# Patient Record
Sex: MALE | Race: ASIAN | NOT HISPANIC OR LATINO
[De-identification: names, ages, dates, MRNs, and addresses within clinical notes are randomized per-mention and may not be internally consistent; named-entity substitution may affect disease eponyms.]

---

## 2017-06-23 ENCOUNTER — APPOINTMENT (OUTPATIENT)
Age: 67
End: 2017-06-23

## 2017-07-11 ENCOUNTER — APPOINTMENT (OUTPATIENT)
Dept: VASCULAR SURGERY | Facility: CLINIC | Age: 67
End: 2017-07-11
Payer: MEDICARE

## 2017-07-11 PROCEDURE — 93990 DOPPLER FLOW TESTING: CPT

## 2017-10-10 ENCOUNTER — APPOINTMENT (OUTPATIENT)
Dept: VASCULAR SURGERY | Facility: CLINIC | Age: 67
End: 2017-10-10

## 2017-10-24 ENCOUNTER — APPOINTMENT (OUTPATIENT)
Dept: VASCULAR SURGERY | Facility: CLINIC | Age: 67
End: 2017-10-24
Payer: MEDICARE

## 2017-10-24 PROCEDURE — 93990 DOPPLER FLOW TESTING: CPT

## 2017-10-24 PROCEDURE — 99213 OFFICE O/P EST LOW 20 MIN: CPT

## 2018-02-15 ENCOUNTER — APPOINTMENT (OUTPATIENT)
Dept: VASCULAR SURGERY | Facility: CLINIC | Age: 68
End: 2018-02-15
Payer: MEDICARE

## 2018-02-15 VITALS
HEART RATE: 60 BPM | DIASTOLIC BLOOD PRESSURE: 76 MMHG | RESPIRATION RATE: 17 BRPM | HEIGHT: 67 IN | TEMPERATURE: 98.7 F | WEIGHT: 194 LBS | SYSTOLIC BLOOD PRESSURE: 157 MMHG | BODY MASS INDEX: 30.45 KG/M2

## 2018-02-15 PROCEDURE — 99213 OFFICE O/P EST LOW 20 MIN: CPT

## 2018-02-15 PROCEDURE — 93990 DOPPLER FLOW TESTING: CPT

## 2018-03-01 ENCOUNTER — FORM ENCOUNTER (OUTPATIENT)
Age: 68
End: 2018-03-01

## 2018-03-02 ENCOUNTER — APPOINTMENT (OUTPATIENT)
Age: 68
End: 2018-03-02
Payer: MEDICARE

## 2018-03-02 PROCEDURE — 36907Z: CUSTOM | Mod: 59

## 2018-03-02 PROCEDURE — 36902Z: CUSTOM

## 2018-05-03 ENCOUNTER — APPOINTMENT (OUTPATIENT)
Dept: VASCULAR SURGERY | Facility: CLINIC | Age: 68
End: 2018-05-03
Payer: MEDICARE

## 2018-05-03 PROCEDURE — 93990 DOPPLER FLOW TESTING: CPT

## 2018-05-03 PROCEDURE — 99213 OFFICE O/P EST LOW 20 MIN: CPT

## 2018-08-28 ENCOUNTER — APPOINTMENT (OUTPATIENT)
Dept: VASCULAR SURGERY | Facility: CLINIC | Age: 68
End: 2018-08-28
Payer: MEDICARE

## 2018-08-28 VITALS
HEART RATE: 75 BPM | BODY MASS INDEX: 30.45 KG/M2 | HEIGHT: 67 IN | DIASTOLIC BLOOD PRESSURE: 66 MMHG | SYSTOLIC BLOOD PRESSURE: 133 MMHG | WEIGHT: 194 LBS

## 2018-08-28 PROCEDURE — 99213 OFFICE O/P EST LOW 20 MIN: CPT

## 2018-08-28 PROCEDURE — 93990 DOPPLER FLOW TESTING: CPT

## 2018-11-27 ENCOUNTER — APPOINTMENT (OUTPATIENT)
Dept: VASCULAR SURGERY | Facility: CLINIC | Age: 68
End: 2018-11-27
Payer: MEDICARE

## 2018-11-27 ENCOUNTER — FORM ENCOUNTER (OUTPATIENT)
Age: 68
End: 2018-11-27

## 2018-11-27 PROCEDURE — 93990 DOPPLER FLOW TESTING: CPT

## 2018-11-28 ENCOUNTER — APPOINTMENT (OUTPATIENT)
Dept: ENDOVASCULAR SURGERY | Facility: CLINIC | Age: 68
End: 2018-11-28
Payer: MEDICARE

## 2018-11-28 PROCEDURE — 36908Z: CUSTOM

## 2018-11-28 PROCEDURE — 36902Z: CUSTOM | Mod: 59

## 2019-03-18 ENCOUNTER — APPOINTMENT (OUTPATIENT)
Dept: VASCULAR SURGERY | Facility: CLINIC | Age: 69
End: 2019-03-18
Payer: MEDICARE

## 2019-03-18 PROCEDURE — 93990 DOPPLER FLOW TESTING: CPT

## 2019-03-18 PROCEDURE — 99214 OFFICE O/P EST MOD 30 MIN: CPT

## 2019-03-18 NOTE — PHYSICAL EXAM
[Aneurysm] : aneurysm [Pulsatile Thrill] : pulsatile thrill [Normal] : normoactive bowel sounds, soft and nontender, no hepatosplenomegaly or masses appreciated [Bleeding] : no bleeding

## 2019-03-18 NOTE — REASON FOR VISIT
[Follow-Up Visit] : a follow-up visit for [Aneurysm] : aneurysm [Prolonged Bleeding] : prolonged bleeding

## 2019-03-18 NOTE — ASSESSMENT
[FreeTextEntry1] : Patient with renal failure on hemodialysis. Left brachiocephalic fistula is pulsatile with reported prolonged bleeding after hemodialysis. Plan for fistulogram and angioplasty.

## 2019-04-02 ENCOUNTER — FORM ENCOUNTER (OUTPATIENT)
Age: 69
End: 2019-04-02

## 2019-04-03 ENCOUNTER — APPOINTMENT (OUTPATIENT)
Dept: ENDOVASCULAR SURGERY | Facility: CLINIC | Age: 69
End: 2019-04-03
Payer: MEDICARE

## 2019-04-03 VITALS
BODY MASS INDEX: 28.93 KG/M2 | DIASTOLIC BLOOD PRESSURE: 76 MMHG | WEIGHT: 180 LBS | OXYGEN SATURATION: 98 % | RESPIRATION RATE: 18 BRPM | SYSTOLIC BLOOD PRESSURE: 156 MMHG | TEMPERATURE: 97.4 F | HEART RATE: 68 BPM | HEIGHT: 66 IN

## 2019-04-03 PROCEDURE — 36902Z: CUSTOM

## 2019-04-03 PROCEDURE — 36907Z: CUSTOM | Mod: 59

## 2019-04-12 NOTE — REASON FOR VISIT
[Other ___] : a [unfilled] visit for [Prolonged Bleeding] : prolonged bleeding [Spouse] : spouse [FreeTextEntry2] : stenosis on duplex

## 2019-04-12 NOTE — PAST MEDICAL HISTORY
[Increasing age ( >40 years old)] : Increasing age ( >40 years old) [Major surgery] : Major surgery [No therapy indicated for cases scheduled for less than one hour] : No therapy indicated for cases scheduled for less than one hour. [FreeTextEntry1] : Malignant Hyperthermia Screening Tool and Risk of Bleeding Assessment\par \par Mr. VIOLETTA RENTERIA denies family history of unexpected death following Anesthesia or Exercise.\par Denies Family history of Malignant Hyperthermia, Muscle or Neuromuscular disorder and High Temperature following exercise.\par \par Mr. VIOLETTA RENTERIA denies history of Muscle Spasm, Dark or Chocolate - Colored urine and Unanticipated fever immediately following anesthesia or serious exercise. \par Mr. RENTERIA also denies bleeding tendencies/ Risks of Bleeding.\par

## 2019-07-22 ENCOUNTER — APPOINTMENT (OUTPATIENT)
Dept: VASCULAR SURGERY | Facility: CLINIC | Age: 69
End: 2019-07-22
Payer: MEDICARE

## 2019-07-22 VITALS
WEIGHT: 180 LBS | HEART RATE: 69 BPM | TEMPERATURE: 97.8 F | BODY MASS INDEX: 28.93 KG/M2 | DIASTOLIC BLOOD PRESSURE: 78 MMHG | SYSTOLIC BLOOD PRESSURE: 151 MMHG | HEIGHT: 66 IN

## 2019-07-22 PROCEDURE — 99213 OFFICE O/P EST LOW 20 MIN: CPT

## 2019-07-22 PROCEDURE — 93990 DOPPLER FLOW TESTING: CPT

## 2019-07-22 NOTE — ASSESSMENT
[FreeTextEntry1] : Patient with renal failure on hemodialysis with moderate stenosis of fistula on duplex. No difficulty analysis. Recommend close followup in 6 weeks duplex.

## 2019-07-22 NOTE — PHYSICAL EXAM
[Aneurysm] : aneurysm [Thrill] : thrill [Bleeding] : no bleeding [Normal] : normoactive bowel sounds, soft and nontender, no hepatosplenomegaly or masses appreciated

## 2019-09-09 ENCOUNTER — APPOINTMENT (OUTPATIENT)
Dept: VASCULAR SURGERY | Facility: CLINIC | Age: 69
End: 2019-09-09
Payer: MEDICARE

## 2019-09-09 PROCEDURE — 93990 DOPPLER FLOW TESTING: CPT

## 2019-09-09 PROCEDURE — 99213 OFFICE O/P EST LOW 20 MIN: CPT

## 2019-09-11 NOTE — HISTORY OF PRESENT ILLNESS
[] : left brachiocephalic fistula [FreeTextEntry1] : 70 yo male with history of esrd on hd presents for follow up of left upper extremity avf.  pt reports prolonged bleeding from the left upper extremity avf after hd especialy the distal puncture site about once a week

## 2019-09-11 NOTE — DISCUSSION/SUMMARY
[FreeTextEntry1] : 70 yo male with history of esrd on hd presents for follow up of left upper extremity avf.\par duplex shows 50-75% stenosis of the mid upper arm \par given the bleeding after hd and tense pulsitility of avf will arrange for fistulagram of the left upper extremity

## 2019-09-11 NOTE — PHYSICAL EXAM
[Thrill] : thrill [Pulsatile Thrill] : pulsatile thrill [Aneurysm] : aneurysm [Hand well perfused] : hand well perfused [2+] : left 2+ [Ulcer] : no ulcer [Bleeding] : no bleeding [Normal] : normoactive bowel sounds, soft and nontender, no hepatosplenomegaly or masses appreciated [de-identified] :  strength 5/5 b/l upper extremities [de-identified] : intact

## 2019-09-16 ENCOUNTER — OUTPATIENT (OUTPATIENT)
Dept: OUTPATIENT SERVICES | Facility: HOSPITAL | Age: 69
LOS: 1 days | Discharge: ANOTHER HOSPITAL | End: 2019-09-16
Payer: MEDICARE

## 2019-09-16 ENCOUNTER — INPATIENT (INPATIENT)
Facility: HOSPITAL | Age: 69
LOS: 19 days | Discharge: HOME | DRG: 235 | End: 2019-10-06
Attending: THORACIC SURGERY (CARDIOTHORACIC VASCULAR SURGERY) | Admitting: THORACIC SURGERY (CARDIOTHORACIC VASCULAR SURGERY)
Payer: MEDICARE

## 2019-09-16 VITALS
WEIGHT: 184.53 LBS | HEIGHT: 66 IN | SYSTOLIC BLOOD PRESSURE: 198 MMHG | HEART RATE: 73 BPM | DIASTOLIC BLOOD PRESSURE: 91 MMHG

## 2019-09-16 DIAGNOSIS — Z98.49 CATARACT EXTRACTION STATUS, UNSPECIFIED EYE: Chronic | ICD-10-CM

## 2019-09-16 DIAGNOSIS — I25.10 ATHEROSCLEROTIC HEART DISEASE OF NATIVE CORONARY ARTERY WITHOUT ANGINA PECTORIS: ICD-10-CM

## 2019-09-16 DIAGNOSIS — Z99.2 DEPENDENCE ON RENAL DIALYSIS: Chronic | ICD-10-CM

## 2019-09-16 DIAGNOSIS — N18.6 END STAGE RENAL DISEASE: ICD-10-CM

## 2019-09-16 DIAGNOSIS — E78.00 PURE HYPERCHOLESTEROLEMIA, UNSPECIFIED: ICD-10-CM

## 2019-09-16 DIAGNOSIS — E11.22 TYPE 2 DIABETES MELLITUS WITH DIABETIC CHRONIC KIDNEY DISEASE: ICD-10-CM

## 2019-09-16 DIAGNOSIS — I25.110 ATHEROSCLEROTIC HEART DISEASE OF NATIVE CORONARY ARTERY WITH UNSTABLE ANGINA PECTORIS: ICD-10-CM

## 2019-09-16 LAB
ANION GAP SERPL CALC-SCNC: 18 MMO/L — HIGH (ref 7–14)
APPEARANCE UR: CLEAR — SIGNIFICANT CHANGE UP
APTT BLD: 29.8 SEC — SIGNIFICANT CHANGE UP (ref 27.5–36.3)
BASOPHILS # BLD AUTO: 0 K/UL — SIGNIFICANT CHANGE UP (ref 0–0.2)
BASOPHILS NFR BLD AUTO: 0.4 % — SIGNIFICANT CHANGE UP (ref 0–2)
BILIRUB UR-MCNC: NEGATIVE — SIGNIFICANT CHANGE UP
BLD GP AB SCN SERPL QL: NEGATIVE — SIGNIFICANT CHANGE UP
BUN SERPL-MCNC: 68 MG/DL — HIGH (ref 7–23)
CALCIUM SERPL-MCNC: 9.2 MG/DL — SIGNIFICANT CHANGE UP (ref 8.4–10.5)
CHLORIDE SERPL-SCNC: 94 MMOL/L — LOW (ref 98–107)
CK MB BLD-MCNC: 4.6 % — HIGH (ref 0–3.5)
CK MB CFR SERPL CALC: 5.8 NG/ML — SIGNIFICANT CHANGE UP (ref 0–6.7)
CK SERPL-CCNC: 127 U/L — SIGNIFICANT CHANGE UP (ref 30–200)
CO2 SERPL-SCNC: 25 MMOL/L — SIGNIFICANT CHANGE UP (ref 22–31)
COLOR SPEC: SIGNIFICANT CHANGE UP
CREAT SERPL-MCNC: 7.8 MG/DL — HIGH (ref 0.5–1.3)
DIFF PNL FLD: ABNORMAL
EOSINOPHIL # BLD AUTO: 0.2 K/UL — SIGNIFICANT CHANGE UP (ref 0–0.5)
EOSINOPHIL NFR BLD AUTO: 1.8 % — SIGNIFICANT CHANGE UP (ref 0–6)
ESTIMATED AVERAGE GLUCOSE: 171 MG/DL — HIGH (ref 68–114)
GLUCOSE BLDC GLUCOMTR-MCNC: 116 MG/DL — HIGH (ref 70–99)
GLUCOSE BLDC GLUCOMTR-MCNC: 156 MG/DL — HIGH (ref 70–99)
GLUCOSE SERPL-MCNC: 170 MG/DL — HIGH (ref 70–99)
GLUCOSE UR QL: ABNORMAL
HBA1C BLD-MCNC: 7.6 % — HIGH (ref 4–5.6)
HBA1C BLD-MCNC: 7.6 % — HIGH (ref 4–5.6)
HBA1C BLD-MCNC: 7.7 % — HIGH (ref 4–5.6)
HCT VFR BLD CALC: 37.5 % — LOW (ref 39–50)
HCT VFR BLD CALC: 40.3 % — SIGNIFICANT CHANGE UP (ref 39–50)
HGB BLD-MCNC: 12.3 G/DL — LOW (ref 13–17)
HGB BLD-MCNC: 13.3 G/DL — SIGNIFICANT CHANGE UP (ref 13–17)
INR BLD: 1.15 RATIO — SIGNIFICANT CHANGE UP (ref 0.88–1.16)
KETONES UR-MCNC: NEGATIVE — SIGNIFICANT CHANGE UP
LEUKOCYTE ESTERASE UR-ACNC: NEGATIVE — SIGNIFICANT CHANGE UP
LYMPHOCYTES # BLD AUTO: 1.7 K/UL — SIGNIFICANT CHANGE UP (ref 1–3.3)
LYMPHOCYTES # BLD AUTO: 18.7 % — SIGNIFICANT CHANGE UP (ref 13–44)
MCHC RBC-ENTMCNC: 28.7 PG — SIGNIFICANT CHANGE UP (ref 27–34)
MCHC RBC-ENTMCNC: 29.1 PG — SIGNIFICANT CHANGE UP (ref 27–34)
MCHC RBC-ENTMCNC: 32.8 % — SIGNIFICANT CHANGE UP (ref 32–36)
MCHC RBC-ENTMCNC: 32.9 GM/DL — SIGNIFICANT CHANGE UP (ref 32–36)
MCV RBC AUTO: 87.6 FL — SIGNIFICANT CHANGE UP (ref 80–100)
MCV RBC AUTO: 88.7 FL — SIGNIFICANT CHANGE UP (ref 80–100)
MONOCYTES # BLD AUTO: 0.6 K/UL — SIGNIFICANT CHANGE UP (ref 0–0.9)
MONOCYTES NFR BLD AUTO: 6.3 % — SIGNIFICANT CHANGE UP (ref 2–14)
MRSA PCR RESULT.: SIGNIFICANT CHANGE UP
NEUTROPHILS # BLD AUTO: 6.6 K/UL — SIGNIFICANT CHANGE UP (ref 1.8–7.4)
NEUTROPHILS NFR BLD AUTO: 72.8 % — SIGNIFICANT CHANGE UP (ref 43–77)
NITRITE UR-MCNC: NEGATIVE — SIGNIFICANT CHANGE UP
NRBC # FLD: 0 K/UL — SIGNIFICANT CHANGE UP (ref 0–0)
PA ADP PRP-ACNC: 241 PRU — SIGNIFICANT CHANGE UP (ref 194–417)
PH UR: 6.5 — SIGNIFICANT CHANGE UP (ref 5–8)
PLATELET # BLD AUTO: 167 K/UL — SIGNIFICANT CHANGE UP (ref 150–400)
PLATELET # BLD AUTO: 176 K/UL — SIGNIFICANT CHANGE UP (ref 150–400)
PMV BLD: 10.4 FL — SIGNIFICANT CHANGE UP (ref 7–13)
POTASSIUM SERPL-MCNC: 4.7 MMOL/L — SIGNIFICANT CHANGE UP (ref 3.5–5.3)
POTASSIUM SERPL-SCNC: 4.7 MMOL/L — SIGNIFICANT CHANGE UP (ref 3.5–5.3)
PROT UR-MCNC: ABNORMAL
PROTHROM AB SERPL-ACNC: 13.3 SEC — HIGH (ref 10–12.9)
RBC # BLD: 4.28 M/UL — SIGNIFICANT CHANGE UP (ref 4.2–5.8)
RBC # BLD: 4.55 M/UL — SIGNIFICANT CHANGE UP (ref 4.2–5.8)
RBC # FLD: 11.7 % — SIGNIFICANT CHANGE UP (ref 10.3–14.5)
RBC # FLD: 12.2 % — SIGNIFICANT CHANGE UP (ref 10.3–14.5)
RH IG SCN BLD-IMP: POSITIVE — SIGNIFICANT CHANGE UP
S AUREUS DNA NOSE QL NAA+PROBE: SIGNIFICANT CHANGE UP
SODIUM SERPL-SCNC: 137 MMOL/L — SIGNIFICANT CHANGE UP (ref 135–145)
SP GR SPEC: 1.02 — SIGNIFICANT CHANGE UP (ref 1.01–1.02)
TROPONIN T, HIGH SENSITIVITY RESULT: 98 NG/L — HIGH (ref 0–51)
TSH SERPL-MCNC: 0.36 UIU/ML — SIGNIFICANT CHANGE UP (ref 0.27–4.2)
UROBILINOGEN FLD QL: NEGATIVE — SIGNIFICANT CHANGE UP
WBC # BLD: 8.07 K/UL — SIGNIFICANT CHANGE UP (ref 3.8–10.5)
WBC # BLD: 9 K/UL — SIGNIFICANT CHANGE UP (ref 3.8–10.5)
WBC # FLD AUTO: 8.07 K/UL — SIGNIFICANT CHANGE UP (ref 3.8–10.5)
WBC # FLD AUTO: 9 K/UL — SIGNIFICANT CHANGE UP (ref 3.8–10.5)

## 2019-09-16 PROCEDURE — 71045 X-RAY EXAM CHEST 1 VIEW: CPT | Mod: 26

## 2019-09-16 PROCEDURE — 93010 ELECTROCARDIOGRAM REPORT: CPT

## 2019-09-16 PROCEDURE — 99291 CRITICAL CARE FIRST HOUR: CPT

## 2019-09-16 RX ORDER — SODIUM CHLORIDE 9 MG/ML
3 INJECTION INTRAMUSCULAR; INTRAVENOUS; SUBCUTANEOUS EVERY 8 HOURS
Refills: 0 | Status: DISCONTINUED | OUTPATIENT
Start: 2019-09-16 | End: 2019-09-23

## 2019-09-16 RX ORDER — LABETALOL HCL 100 MG
200 TABLET ORAL EVERY 8 HOURS
Refills: 0 | Status: DISCONTINUED | OUTPATIENT
Start: 2019-09-16 | End: 2019-09-17

## 2019-09-16 RX ORDER — HEPARIN SODIUM 5000 [USP'U]/ML
1000 INJECTION INTRAVENOUS; SUBCUTANEOUS
Qty: 25000 | Refills: 0 | Status: DISCONTINUED | OUTPATIENT
Start: 2019-09-16 | End: 2019-09-19

## 2019-09-16 RX ORDER — ATORVASTATIN CALCIUM 80 MG/1
80 TABLET, FILM COATED ORAL AT BEDTIME
Refills: 0 | Status: DISCONTINUED | OUTPATIENT
Start: 2019-09-16 | End: 2019-09-23

## 2019-09-16 RX ORDER — ASPIRIN/CALCIUM CARB/MAGNESIUM 324 MG
81 TABLET ORAL DAILY
Refills: 0 | Status: DISCONTINUED | OUTPATIENT
Start: 2019-09-16 | End: 2019-09-23

## 2019-09-16 RX ORDER — METOPROLOL TARTRATE 50 MG
2.5 TABLET ORAL ONCE
Refills: 0 | Status: COMPLETED | OUTPATIENT
Start: 2019-09-16 | End: 2019-09-16

## 2019-09-16 RX ORDER — AMLODIPINE BESYLATE 2.5 MG/1
10 TABLET ORAL DAILY
Refills: 0 | Status: DISCONTINUED | OUTPATIENT
Start: 2019-09-16 | End: 2019-09-23

## 2019-09-16 RX ORDER — SODIUM CHLORIDE 9 MG/ML
3 INJECTION INTRAMUSCULAR; INTRAVENOUS; SUBCUTANEOUS EVERY 8 HOURS
Refills: 0 | Status: DISCONTINUED | OUTPATIENT
Start: 2019-09-16 | End: 2019-10-04

## 2019-09-16 RX ORDER — INSULIN LISPRO 100/ML
VIAL (ML) SUBCUTANEOUS
Refills: 0 | Status: DISCONTINUED | OUTPATIENT
Start: 2019-09-16 | End: 2019-09-23

## 2019-09-16 RX ORDER — INSULIN LISPRO 100/ML
VIAL (ML) SUBCUTANEOUS AT BEDTIME
Refills: 0 | Status: DISCONTINUED | OUTPATIENT
Start: 2019-09-16 | End: 2019-09-23

## 2019-09-16 RX ORDER — DOCUSATE SODIUM 100 MG
100 CAPSULE ORAL THREE TIMES A DAY
Refills: 0 | Status: DISCONTINUED | OUTPATIENT
Start: 2019-09-16 | End: 2019-09-23

## 2019-09-16 RX ORDER — PANTOPRAZOLE SODIUM 20 MG/1
40 TABLET, DELAYED RELEASE ORAL
Refills: 0 | Status: DISCONTINUED | OUTPATIENT
Start: 2019-09-16 | End: 2019-09-23

## 2019-09-16 RX ORDER — CHLORHEXIDINE GLUCONATE 213 G/1000ML
1 SOLUTION TOPICAL
Refills: 0 | Status: DISCONTINUED | OUTPATIENT
Start: 2019-09-16 | End: 2019-09-23

## 2019-09-16 RX ORDER — HEPARIN SODIUM 5000 [USP'U]/ML
5000 INJECTION INTRAVENOUS; SUBCUTANEOUS EVERY 8 HOURS
Refills: 0 | Status: DISCONTINUED | OUTPATIENT
Start: 2019-09-16 | End: 2019-09-16

## 2019-09-16 RX ADMIN — Medication 100 MILLIGRAM(S): at 21:59

## 2019-09-16 RX ADMIN — Medication 200 MILLIGRAM(S): at 21:59

## 2019-09-16 RX ADMIN — Medication 200 MILLIGRAM(S): at 18:43

## 2019-09-16 RX ADMIN — ATORVASTATIN CALCIUM 80 MILLIGRAM(S): 80 TABLET, FILM COATED ORAL at 21:59

## 2019-09-16 RX ADMIN — CHLORHEXIDINE GLUCONATE 1 APPLICATION(S): 213 SOLUTION TOPICAL at 22:00

## 2019-09-16 RX ADMIN — SODIUM CHLORIDE 3 MILLILITER(S): 9 INJECTION INTRAMUSCULAR; INTRAVENOUS; SUBCUTANEOUS at 10:00

## 2019-09-16 RX ADMIN — AMLODIPINE BESYLATE 10 MILLIGRAM(S): 2.5 TABLET ORAL at 16:28

## 2019-09-16 RX ADMIN — PANTOPRAZOLE SODIUM 40 MILLIGRAM(S): 20 TABLET, DELAYED RELEASE ORAL at 22:00

## 2019-09-16 RX ADMIN — Medication 2.5 MILLIGRAM(S): at 16:21

## 2019-09-16 RX ADMIN — Medication 81 MILLIGRAM(S): at 16:28

## 2019-09-16 NOTE — H&P ADULT - NSHPPHYSICALEXAM_GEN_ALL_CORE
General: WN/WD NAD  Neurology: A&Ox3, nonfocal, GRAVES x 4  Head:  Normocephalic, atraumatic  ENT:  Mucosa moist, no ulcerations  Neck:  Supple, no sinuses or palpable masses  Lymphatic:  No palpable cervical, supraclavicular, axillary or inguinal adenopathy  Respiratory: CTA B/L  CV: RRR, S1S2, no murmur  Abdominal: Soft, NT, ND no palpable mass  MSK: No edema, + peripheral pulses, FROM all 4 extremity  Incisions: intact, no erythema or drainage General: WN/WD NAD, Lying comfortably in bed  Neurology: A&Ox3, nonfocal, GRAVES x 4  Head:  Normocephalic, atraumatic  ENT:  Mucosa moist, no ulcerations  Neck:  Supple, no sinuses or palpable masses  Lymphatic:  No palpable cervical, supraclavicular, axillary or inguinal adenopathy  Respiratory: CTA B/L, equal chest expansion  CV: RRR, S1S2, no murmur, gallops or rubs; Sinus rhythm on telemetry  Abdominal: Obese, Soft, NT, ND no palpable mass  MSK: No edema, + peripheral pulses, FROM all 4 extremity  Vascular: Right groin cath site, C/D/I, sheath removed at 1425, LUE AVF with palpable thrill.

## 2019-09-16 NOTE — H&P ADULT - NSHPLABSRESULTS_GEN_ALL_CORE
CORONARY VESSELS: The coronary circulation is right dominant.  LM:   --  Ostial LM: There was a discrete 90 % stenosis.  LAD:   --  Mid LAD: Angiography showed minor luminal irregularities with no  flow limiting lesions. The stent in the mid LAD is patent.  --  Distal LAD: The vessel was small sized. Angiography showed moderate  atherosclerosis.  CX:   --  Mid circumflex: There was a tubular 50 % stenosis at the site of  a prior stent.  RI:   --  Proximal ramus intermedius: There was a diffuse 50 % stenosis at  the site of a prior stent.  RCA:   --  Mid RCA: There was a tubular 50 % stenosis at the site of a  prior stent.  --  RPLS: Angiography showed minor luminal irregularities with no flow  limiting lesions.    Complete Blood Count + Automated Diff (09.16.19 @ 17:12)    WBC Count: 9.0 K/uL    RBC Count: 4.55 M/uL    Hemoglobin: 13.3 g/dL    Hematocrit: 40.3 %    Mean Cell Volume: 88.7 fl    Mean Cell Hemoglobin: 29.1 pg    Mean Cell Hemoglobin Conc: 32.9 gm/dL    Red Cell Distrib Width: 11.7 %    Platelet Count - Automated: 176 K/uL    Auto Neutrophil #: 6.6 K/uL    Auto Lymphocyte #: 1.7 K/uL    Auto Monocyte #: 0.6 K/uL    Auto Eosinophil #: 0.2 K/uL    Auto Basophil #: 0.0 K/uL    Auto Neutrophil %: 72.8: Differential percentages must be correlated with absolute numbers for  clinical significance. %    Auto Lymphocyte %: 18.7 %    Auto Monocyte %: 6.3 %    Auto Eosinophil %: 1.8 %    Auto Basophil %: 0.4 %    Basic Metabolic Panel - STAT (09.16.19 @ 10:05)    Sodium, Serum: 137 mmol/L    Potassium, Serum: 4.7 mmol/L    Chloride, Serum: 94 mmol/L    Carbon Dioxide, Serum: 25 mmol/L    Anion Gap, Serum: 18 mmo/L    Blood Urea Nitrogen, Serum: 68 mg/dL    Creatinine, Serum: 7.80 mg/dL    Glucose, Serum: 170 mg/dL    Calcium, Total Serum: 9.2 mg/dL    eGFR if Non : 6: The units for eGFR are ml/min/1.73m2 (normalized body  surface area). The eGFR is calculated from a serum  creatinine using the CKD-EPI equation. Other variables  required for calculation are race, age and sex. Among  patients with chronic kidney disease (CKD), the eGFR is  useful in determining the stage of disease according to  KDOQI CKD classification. All eGFR results are reported  numerically with the following interpretation.    GFR  (ml/min/1.73 m2)          W/KIDNEY DAMAGE    W/O KIDNEY DMG  ==========================================================  >= 90.......................Stage 1..............Normal  60-89.......................Stage 2...........Decreased GFR  30-59.......................Stage 3..............Stage 3  15-29.......................Stage 4..............Stage 4  < 15........................Stage 5..............Stage 5    Each stage of CKD assumes that the associated GFR level  has been in effect for at least 3 months. Determination of  stages one and two (with eGFR > 59ml/min/m2) requires  estimation of kidney damage for at least 3 months as  defined by structural or functional abnormalities.    Limitations: All estimates of GFR will be less accurate  for patients at extremes of muscle mass (including but  not limited to frail elderly, critically ill, or cancer  patients), those with unusual diets, and those with  conditions associated with reduced secretion or  extrarenal elimination of creatinine. The eGFR equation  is not recommended for use in patients with unstable  creatinine levels. mL/min    eGFR if : 7 mL/min

## 2019-09-16 NOTE — H&P ADULT - NSICDXFAMILYHX_GEN_ALL_CORE_FT
FAMILY HISTORY:  Family history of coronary artery disease    Sibling  Still living? Unknown  Family history of diabetes mellitus, Age at diagnosis: Age Unknown

## 2019-09-16 NOTE — H&P ADULT - ASSESSMENT
69M ESRD on HD(Tu/Th/Sa), HTN, CAD s/p PCI with 15 stents placed, s/p cardiac cath today which showed LM disease with 90%, now transferred to [facility] for CT Surgery evaluation.    -Resume home anti-hypertensive meds  - Hold Brillinta  - Heparin drip  - Pre-operative workup  - Carotids, TFTs, Hgb A1c,   - Plan for CABG with Dr. Herzog

## 2019-09-16 NOTE — H&P CARDIOLOGY - PSH
Boil of Buttock  2006 and 2008  Hemodialysis access, AV graft  5/2015, L arm  S/P cataract extraction

## 2019-09-16 NOTE — H&P CARDIOLOGY - REVIEW OF SYSTEMS
The patient denies chest pain,  palpitations, dizziness, presyncope, syncope, b/l lower  extremities edema, abdominal pain, N/V/D/C, melena, hematochezia, h/o DVT, PE, AMY, fever, chills, urinary symptoms, hematuria.

## 2019-09-16 NOTE — H&P CARDIOLOGY - ATTENDING COMMENTS
Patient seen and examined.  Agree with above.   -Admitted post cath after cath revealed severe ostial LM disease  -CTS eval with Dr. Herzog for CABG evaluation  -Transfer to [facility] For CABG  -further workup pending above    Kalie Lau MD

## 2019-09-16 NOTE — H&P ADULT - HISTORY OF PRESENT ILLNESS
69 y.o. male with pmh of ESRD( HD on tu/Th/sa) Last dialyzed yesterday 9/15, was sent to [facility] for cath after presenting with SOB and a positive stress test. Presented today for elective cardiac catheterization. The patient c/o SOB with exertion (walking <1 block) started 2 months ago. The patient denies chest pain,  palpitations, dizziness, presyncope, syncope, b/l lower  extremities edema, abdominal pain, N/V/D/C, melena, hematochezia, h/o DVT, PE, AMY, fever, chills, urinary symptoms, hematuria.  The patient was evaluated by his cardiologist, was noted to have abnormal stress test (5/13/19) - EF 50%, fixed proximal and mid inferior and inferolateral wall perfusion defect c/w MI and mild to moderate jose martin-infarct ischemia.  Echo- 8/2019 - normal LV size and function, mild diastolic dysfunction, mild TR.  Due to patient's symptoms and abnormal non invasive tests, the patient was recommended to have cardiac catheterization. The patient denies any complaints at present.   Cardiac cath performed and revealed Ostial Left main stenosis of 90%, Mid Cx in stent 50% and RCA in stent 50%; Patent stent in LAD. Patient was transferred to [facility] for CT surgery eval and possible CABG with Dr. Herzog.

## 2019-09-16 NOTE — H&P CARDIOLOGY - NS MD HP PULSE RADIAL
What Type Of Note Output Would You Prefer (Optional)?: Bullet Format What Is The Reason For Today's Visit?: Full Body Skin Examination with No Concerns What Is The Reason For Today's Visit? (Being Monitored For X): concerning skin lesions on an annual basis How Severe Are Your Spot(S)?: mild right normal/left normal

## 2019-09-16 NOTE — H&P CARDIOLOGY - HISTORY OF PRESENT ILLNESS
69 y.o. male presents today for elective cardiac catheterization. The patient c/o SOB with exertion (walking <1 block) started 2 months ago. The patient denies chest pain,  palpitations, dizziness, presyncope, syncope, b/l lower  extremities edema, abdominal pain, N/V/D/C, melena, hematochezia, h/o DVT, PE, AMY, fever, chills, urinary symptoms, hematuria. 69 y.o. male presents today for elective cardiac catheterization. The patient c/o SOB with exertion (walking <1 block) started 2 months ago. The patient denies chest pain,  palpitations, dizziness, presyncope, syncope, b/l lower  extremities edema, abdominal pain, N/V/D/C, melena, hematochezia, h/o DVT, PE, AMY, fever, chills, urinary symptoms, hematuria.  The patient was evaluated by his cardiologist, was noted to have abnormal stress test (5/13/19) - EF 50%, fixed proximal and mid inferior and inferolateral wall perfusion defect c/w MI and mild to moderate jose martin-infarct ischemia.  Echo- 8/2019 - normal LV size and function, mild diastolic dysfunction, mild TR.  Due to patient's symptoms and abnormal non invasive tests, the patient was recommended to have cardiac catheterization. The patient denies any complaints at present.

## 2019-09-16 NOTE — CONSULT NOTE ADULT - SUBJECTIVE AND OBJECTIVE BOX
[facility] - Eugene / Marlena S /Cruz/ IWLY Mackey/ WILY Macias/ Alexandre Esposito / WILMER Njdocu  ---------------------------------------------------------------------------------------------------------------  Patient seen and examined bedside in cath recovery room    69 y.o. male w/ESRD on HD TTS, our hd pt from [facility], presented today for an elective cardiac catheterization. The patient c/o SOB with exertion (walking <1 block) started 2 months ago. Pt had abnormal stress test (5/13/19) - EF 50%, fixed proximal and mid inferior and inferolateral wall perfusion defect c/w MI and mild to moderate jose martin-infarct ischemia. Echo- 8/2019 - normal LV size and function, mild diastolic dysfunction, mild TR.  Due to patient's symptoms and abnormal non invasive tests, the patient was recommended to have cardiac catheterization.  pt completed cath, found to have significant left main artery disease, plan to transfer to [facility] for CT sx eval  Renal consulted for ESRD Mx.  The patient denies any complaints at present. denied cp/sob. had last hd 9/14, was complete and uneventful    PAST MEDICAL & SURGICAL HISTORY:  ESRD (end stage renal disease) on dialysis: T-Th-Sat  Stented coronary artery: total 15 stents from 3311-8314  Hyperthyroidism  H/O: CVA: after cardiac stent placed 12/15/09 -no residual  Heart Attack: 2/1/07 with subsequent stent  Coronary Stent: [id number] 10 stents,  to Ramus 1/2015, cath 01/2016 ( see results in HPI)  Hypercholesterolemia  CAD (Coronary Artery Disease)  DM (Diabetes Mellitus): x 4 yrs without N/N/R  HTN (Hypertension)  Hemodialysis access, AV graft: 5/2015, L arm  S/P cataract extraction  Boil of Buttock: 2006 and 2008      Allergies: lisinopril (Other)    Home Medications Reviewed  Hospital Medications:   MEDICATIONS  (STANDING):  sodium chloride 0.9% lock flush 3 milliLiter(s) IV Push every 8 hours    SOCIAL HISTORY:  Denies ETOh,Smoking, illicit drug use  FAMILY HISTORY:  Family history of coronary artery disease  Family history of diabetes mellitus (Sibling)      REVIEW OF SYSTEMS:  CONSTITUTIONAL: No weakness, fevers or chills  EYES/ENT: No visual changes;  No vertigo or throat pain   NECK: No pain or stiffness  RESPIRATORY: No cough, wheezing, hemoptysis; No shortness of breath  CARDIOVASCULAR: No chest pain or palpitations.  GASTROINTESTINAL: No abdominal or epigastric pain. No nausea, vomiting, or hematemesis; No diarrhea or constipation. No melena or hematochezia.  NEUROLOGICAL: No numbness or weakness  SKIN: No itching, burning, rashes, or lesions   VASCULAR: No bilateral lower extremity edema.   All other review of systems is negative unless indicated above.    VITALS:  HR: --  BP: --  RR: 14      Weight (kg): 81.647 (09-16 @ 10:27)    PHYSICAL EXAM:  Constitutional: NAD  HEENT: anicteric sclera, oropharynx clear, MMM  Neck: No JVD  Respiratory: CTAB, no wheezes, rales or rhonchi  Cardiovascular: S1, S2, RRR  Gastrointestinal: BS+, soft, NT/ND  Extremities: No cyanosis or clubbing. No peripheral edema  Neurological: A/O x 3, no focal deficits  Psychiatric: Normal mood, normal affect  : No wagner.       LABS:  09-16    137  |  94<L>  |  68<H>  ----------------------------<  170<H>  4.7   |  25  |  7.80<H>    Ca    9.2      16 Sep 2019 10:05      Creatinine Trend: 7.80 <--                        12.3   8.07  )-----------( 167      ( 16 Sep 2019 10:05 )             37.5     Urine Studies:        RADIOLOGY & ADDITIONAL STUDIES:

## 2019-09-16 NOTE — PROGRESS NOTE ADULT - SUBJECTIVE AND OBJECTIVE BOX
VIOLETTA RENTERIA  MRN-[id number]  Patient is a 69y old  Male who presents with a chief complaint of   HPI:  69 y.o. male with pmh of ESRD( HD on ) Last dialyzed yesterday 9/15, was sent to [facility] for cath after presenting with SOB and a positive stress test. Presented today for elective cardiac catheterization. The patient c/o SOB with exertion (walking <1 block) started 2 months ago. The patient denies chest pain,  palpitations, dizziness, presyncope, syncope, b/l lower  extremities edema, abdominal pain, N/V/D/C, melena, hematochezia, h/o DVT, PE, AMY, fever, chills, urinary symptoms, hematuria.  The patient was evaluated by his cardiologist, was noted to have abnormal stress test (19) - EF 50%, fixed proximal and mid inferior and inferolateral wall perfusion defect c/w MI and mild to moderate jose martin-infarct ischemia.  Echo- 2019 - normal LV size and function, mild diastolic dysfunction, mild TR.  Due to patient's symptoms and abnormal non invasive tests, the patient was recommended to have cardiac catheterization. The patient denies any complaints at present.   Cardiac cath performed and revealed Ostial Left main stenosis of 90%, Mid Cx in stent 50% and RCA in stent 50%; Patent stent in LAD. Patient was transferred to [facility] for CT surgery eval and possible CABG with Dr. Herzog. (16 Sep 2019 16:43)      Pre Surgery/Hospital course:  c/o dyspnea, no chest pain. + occasional cough,   no fever ; breath better on NC 3L  O2 support   + leg edema, got better.     physical Exam:  Vital Signs Last 24 Hrs  T(C): 36 (16 Sep 2019 20:00), Max: 36 (16 Sep 2019 20:00)  T(F): 96.8 (16 Sep 2019 20:00), Max: 96.8 (16 Sep 2019 20:00)  HR: 63 (16 Sep 2019 21:00) (63 - 73)  BP: 142/69 (16 Sep 2019 21:00) (142/69 - 198/91)  BP(mean): 99 (16 Sep 2019 21:00) (99 - 130)  RR: 21 (16 Sep 2019 21:00) (18 - 26)  SpO2: 100% (16 Sep 2019 21:00) (100% - 100%)  Gen:  Awake, alert   CNS: non focal 	  Neck: no JVD  RES : + bibasilar rales         CVS: Regular  rhythm. Normal S1/S2  Abd: Soft, non-distended. Bowel sounds present.  Skin: No rash.  Ext:  trace edema lower ext.      ============================I/O===========================   I&O's Detail    16 Sep 2019 07:01  -  16 Sep 2019 22:22  --------------------------------------------------------  IN:    heparin Infusion: 10 mL    Oral Fluid: 200 mL  Total IN: 210 mL    OUT:    Voided: 225 mL  Total OUT: 225 mL    Total NET: -15 mL        ============================ LABS =========================                        13.3   9.0   )-----------( 176      ( 16 Sep 2019 17:12 )             40.3     09-16    136  |  93<L>  |  65<H>  ----------------------------<  135<H>  4.2   |  23  |  8.24<H>    Ca    9.5      16 Sep 2019 17:12    TPro  7.2  /  Alb  4.1  /  TBili  0.4  /  DBili  x   /  AST  10  /  ALT  10  /  AlkPhos  81  09-16    LIVER FUNCTIONS - ( 16 Sep 2019 17:12 )  Alb: 4.1 g/dL / Pro: 7.2 g/dL / ALK PHOS: 81 U/L / ALT: 10 U/L / AST: 10 U/L / GGT: x           PT/INR - ( 16 Sep 2019 17:12 )   PT: 13.3 sec;   INR: 1.15 ratio         PTT - ( 16 Sep 2019 17:12 )  PTT:29.8 sec    Urinalysis Basic - ( 16 Sep 2019 17:12 )    Color: Light Yellow / Appearance: Clear / S.016 / pH: x  Gluc: x / Ketone: Negative  / Bili: Negative / Urobili: Negative   Blood: x / Protein: 100 mg/dL / Nitrite: Negative   Leuk Esterase: Negative / RBC: 1 /hpf / WBC 1 /HPF   Sq Epi: x / Non Sq Epi: 0 /hpf / Bacteria: Negative      CXR: Reviewed  cath report :Reviewed  ======================================================  PAST MEDICAL & SURGICAL HISTORY:  ESRD (end stage renal disease) on dialysis: T-Sat  Stented coronary artery: total 15 stents from 4825-5637  Hyperthyroidism  H/O: CVA: after cardiac stent placed 12/15/09 -no residual  Heart Attack: 07 with subsequent stent  Coronary Stent: 4890-6695 10 stents,  to Ramus 2015, cath 2016 ( see results in HPI)  Hypercholesterolemia  CAD (Coronary Artery Disease)  DM (Diabetes Mellitus): x 4 yrs without N/N/R  HTN (Hypertension)  Hemodialysis access, AV graft: 2015, L arm  S/P cataract extraction  Boil of Buttock:  and     ====================ASSESMENT ==============  Dyspnea  acute coronary sysndrome  ASHD/CAD/MI: Left Main disease   ESRD (end stage renal disease) on dialysis: T-Sat  DM2  Hypertension  Hyperlipidemia    Plan:  anticcoagulation with heprain drip, mionitor ptt; titrate to target PTT above 60  removal of R groin sheath; monitor fir bleed , while anticoagulation  oxygen suppplement                                                                                                                                                                                                                                                          i  amLODIPine   Tablet 10 milliGRAM(s) Oral daily  labetalol 200 milliGRAM(s) Oral every 8 hours  aspirin  chewable 81 milliGRAM(s) Oral daily  heparin  Infusion 1000 Unit(s)/Hr (10 mL/Hr) IV Continuous <Continuous>  docusate sodium 100 milliGRAM(s) Oral three times a day  pantoprazole    Tablet 40 milliGRAM(s) Oral before breakfast  atorvastatin 80 milliGRAM(s) Oral at bedtime  insulin lispro (HumaLOG) corrective regimen sliding scale   SubCutaneous three times a day before meals  insulin lispro (HumaLOG) corrective regimen sliding scale   SubCutaneous at bedtime  Patient requires continuous monitoring with bedside rhythm monitoring, ,pulse oximetry, ;intermittent blood gas analysis.  Care plan discussed with ICU care team. plan for surgery after tomorrow.   discussed with pt son at bed side.  patient remain critical;  I have spent 30 min.

## 2019-09-16 NOTE — H&P ADULT - NSICDXPASTSURGICALHX_GEN_ALL_CORE_FT
PAST SURGICAL HISTORY:  Boil of Alison 2006 and 2008    Hemodialysis access, AV graft 5/2015, L arm    S/P cataract extraction

## 2019-09-16 NOTE — H&P ADULT - NSICDXPASTMEDICALHX_GEN_ALL_CORE_FT
PAST MEDICAL HISTORY:  CAD (Coronary Artery Disease)     Coronary Stent [id number] 10 stents,  to Ramus 1/2015, cath 01/2016 ( see results in HPI)    DM (Diabetes Mellitus) x 4 yrs without N/N/R    ESRD (end stage renal disease) on dialysis T-Th-Sat    H/O: CVA after cardiac stent placed 12/15/09 -no residual    Heart Attack 2/1/07 with subsequent stent    HTN (Hypertension)     Hypercholesterolemia     Hyperthyroidism     Stented coronary artery total 15 stents from 7590-8774

## 2019-09-16 NOTE — CONSULT NOTE ADULT - ASSESSMENT
69 y.o. male w/ESRD on HD TTS, our hd pt from [facility], presented today for an elective cardiac catheterization. Renal consulted for ESRD Mx.    ESRD on HD TTS, had last hd 9/14  K, vol acceptable  CAD s/p cath, found to have significant left main artery disease, plan to transfer to [facility] for CT sx eval  HTN, controlled      labs, chart reviewed  no indication for HD at this time  pt stable renal stand point for transfer to [facility].   I signed out to my associates Dr Mackey/Dr Bowen who will see pt at [facility] and arrange for HD  plan as per cardiology  f/u w/CT sx  Thanks for consulting.

## 2019-09-17 DIAGNOSIS — N25.0 RENAL OSTEODYSTROPHY: ICD-10-CM

## 2019-09-17 DIAGNOSIS — I25.10 ATHEROSCLEROTIC HEART DISEASE OF NATIVE CORONARY ARTERY WITHOUT ANGINA PECTORIS: ICD-10-CM

## 2019-09-17 DIAGNOSIS — N18.6 END STAGE RENAL DISEASE: ICD-10-CM

## 2019-09-17 LAB
ALBUMIN SERPL ELPH-MCNC: 3.5 G/DL — SIGNIFICANT CHANGE UP (ref 3.3–5)
ALP SERPL-CCNC: 70 U/L — SIGNIFICANT CHANGE UP (ref 40–120)
ALT FLD-CCNC: 8 U/L — LOW (ref 10–45)
ANION GAP SERPL CALC-SCNC: 18 MMOL/L — HIGH (ref 5–17)
APTT BLD: 104.4 SEC — HIGH (ref 27.5–36.3)
APTT BLD: 73.3 SEC — HIGH (ref 27.5–36.3)
AST SERPL-CCNC: 8 U/L — LOW (ref 10–40)
BASE EXCESS BLDV CALC-SCNC: -0.2 MMOL/L — SIGNIFICANT CHANGE UP (ref -2–2)
BILIRUB SERPL-MCNC: 0.2 MG/DL — SIGNIFICANT CHANGE UP (ref 0.2–1.2)
BUN SERPL-MCNC: 68 MG/DL — HIGH (ref 7–23)
CALCIUM SERPL-MCNC: 8.7 MG/DL — SIGNIFICANT CHANGE UP (ref 8.4–10.5)
CHLORIDE SERPL-SCNC: 95 MMOL/L — LOW (ref 96–108)
CK MB CFR SERPL CALC: 4.6 NG/ML — SIGNIFICANT CHANGE UP (ref 0–6.7)
CK SERPL-CCNC: 68 U/L — SIGNIFICANT CHANGE UP (ref 30–200)
CK SERPL-CCNC: 84 U/L — SIGNIFICANT CHANGE UP (ref 30–200)
CO2 BLDV-SCNC: 28 MMOL/L — SIGNIFICANT CHANGE UP (ref 22–30)
CO2 SERPL-SCNC: 24 MMOL/L — SIGNIFICANT CHANGE UP (ref 22–31)
CREAT SERPL-MCNC: 8.66 MG/DL — HIGH (ref 0.5–1.3)
GAS PNL BLDV: SIGNIFICANT CHANGE UP
GLUCOSE SERPL-MCNC: 195 MG/DL — HIGH (ref 70–99)
HCO3 BLDV-SCNC: 26 MMOL/L — SIGNIFICANT CHANGE UP (ref 21–29)
HCT VFR BLD CALC: 33.9 % — LOW (ref 39–50)
HGB BLD-MCNC: 11.5 G/DL — LOW (ref 13–17)
HOROWITZ INDEX BLDV+IHG-RTO: 24 — SIGNIFICANT CHANGE UP
INR BLD: 1.19 RATIO — HIGH (ref 0.88–1.16)
INR BLD: 1.22 RATIO — HIGH (ref 0.88–1.16)
MAGNESIUM SERPL-MCNC: 1.9 MG/DL — SIGNIFICANT CHANGE UP (ref 1.6–2.6)
MCHC RBC-ENTMCNC: 30.2 PG — SIGNIFICANT CHANGE UP (ref 27–34)
MCHC RBC-ENTMCNC: 34 GM/DL — SIGNIFICANT CHANGE UP (ref 32–36)
MCV RBC AUTO: 89 FL — SIGNIFICANT CHANGE UP (ref 80–100)
PA ADP PRP-ACNC: 260 PRU — SIGNIFICANT CHANGE UP (ref 194–417)
PCO2 BLDV: 54 MMHG — HIGH (ref 35–50)
PH BLDV: 7.31 — LOW (ref 7.35–7.45)
PHOSPHATE SERPL-MCNC: 6.4 MG/DL — HIGH (ref 2.5–4.5)
PLATELET # BLD AUTO: 150 K/UL — SIGNIFICANT CHANGE UP (ref 150–400)
PO2 BLDV: 46 MMHG — HIGH (ref 25–45)
POTASSIUM SERPL-MCNC: 3.9 MMOL/L — SIGNIFICANT CHANGE UP (ref 3.5–5.3)
POTASSIUM SERPL-SCNC: 3.9 MMOL/L — SIGNIFICANT CHANGE UP (ref 3.5–5.3)
PREALB SERPL-MCNC: 34 MG/DL — SIGNIFICANT CHANGE UP (ref 20–40)
PROT SERPL-MCNC: 6.2 G/DL — SIGNIFICANT CHANGE UP (ref 6–8.3)
PROTHROM AB SERPL-ACNC: 13.6 SEC — HIGH (ref 10–12.9)
PROTHROM AB SERPL-ACNC: 14.1 SEC — HIGH (ref 10–12.9)
RBC # BLD: 3.81 M/UL — LOW (ref 4.2–5.8)
RBC # FLD: 11.4 % — SIGNIFICANT CHANGE UP (ref 10.3–14.5)
SAO2 % BLDV: 75 % — SIGNIFICANT CHANGE UP (ref 67–88)
SODIUM SERPL-SCNC: 137 MMOL/L — SIGNIFICANT CHANGE UP (ref 135–145)
TROPONIN T, HIGH SENSITIVITY RESULT: 104 NG/L — HIGH (ref 0–51)
TROPONIN T, HIGH SENSITIVITY RESULT: 96 NG/L — HIGH (ref 0–51)
WBC # BLD: 7.6 K/UL — SIGNIFICANT CHANGE UP (ref 3.8–10.5)
WBC # FLD AUTO: 7.6 K/UL — SIGNIFICANT CHANGE UP (ref 3.8–10.5)

## 2019-09-17 PROCEDURE — 71045 X-RAY EXAM CHEST 1 VIEW: CPT | Mod: 26

## 2019-09-17 PROCEDURE — 93306 TTE W/DOPPLER COMPLETE: CPT | Mod: 26

## 2019-09-17 PROCEDURE — 93010 ELECTROCARDIOGRAM REPORT: CPT

## 2019-09-17 PROCEDURE — 99291 CRITICAL CARE FIRST HOUR: CPT

## 2019-09-17 PROCEDURE — 93880 EXTRACRANIAL BILAT STUDY: CPT | Mod: 26

## 2019-09-17 RX ORDER — NITROGLYCERIN 6.5 MG
15 CAPSULE, EXTENDED RELEASE ORAL
Qty: 50 | Refills: 0 | Status: DISCONTINUED | OUTPATIENT
Start: 2019-09-17 | End: 2019-09-18

## 2019-09-17 RX ORDER — CHLORHEXIDINE GLUCONATE 213 G/1000ML
15 SOLUTION TOPICAL ONCE
Refills: 0 | Status: COMPLETED | OUTPATIENT
Start: 2019-09-17 | End: 2019-09-23

## 2019-09-17 RX ORDER — CHLORHEXIDINE GLUCONATE 213 G/1000ML
1 SOLUTION TOPICAL ONCE
Refills: 0 | Status: COMPLETED | OUTPATIENT
Start: 2019-09-17 | End: 2019-09-17

## 2019-09-17 RX ORDER — SEVELAMER CARBONATE 2400 MG/1
1600 POWDER, FOR SUSPENSION ORAL
Refills: 0 | Status: DISCONTINUED | OUTPATIENT
Start: 2019-09-17 | End: 2019-09-23

## 2019-09-17 RX ORDER — METOPROLOL TARTRATE 50 MG
25 TABLET ORAL EVERY 12 HOURS
Refills: 0 | Status: DISCONTINUED | OUTPATIENT
Start: 2019-09-17 | End: 2019-09-18

## 2019-09-17 RX ADMIN — Medication 100 MILLIGRAM(S): at 05:07

## 2019-09-17 RX ADMIN — SODIUM CHLORIDE 3 MILLILITER(S): 9 INJECTION INTRAMUSCULAR; INTRAVENOUS; SUBCUTANEOUS at 13:33

## 2019-09-17 RX ADMIN — CHLORHEXIDINE GLUCONATE 1 APPLICATION(S): 213 SOLUTION TOPICAL at 20:30

## 2019-09-17 RX ADMIN — Medication 25 MILLIGRAM(S): at 18:22

## 2019-09-17 RX ADMIN — Medication 2: at 08:14

## 2019-09-17 RX ADMIN — Medication 4.5 MICROGRAM(S)/MIN: at 12:10

## 2019-09-17 RX ADMIN — PANTOPRAZOLE SODIUM 40 MILLIGRAM(S): 20 TABLET, DELAYED RELEASE ORAL at 08:19

## 2019-09-17 RX ADMIN — ATORVASTATIN CALCIUM 80 MILLIGRAM(S): 80 TABLET, FILM COATED ORAL at 21:30

## 2019-09-17 RX ADMIN — Medication 25 MILLIGRAM(S): at 05:07

## 2019-09-17 RX ADMIN — HEPARIN SODIUM 8 UNIT(S)/HR: 5000 INJECTION INTRAVENOUS; SUBCUTANEOUS at 07:15

## 2019-09-17 RX ADMIN — SEVELAMER CARBONATE 1600 MILLIGRAM(S): 2400 POWDER, FOR SUSPENSION ORAL at 18:21

## 2019-09-17 RX ADMIN — SODIUM CHLORIDE 3 MILLILITER(S): 9 INJECTION INTRAMUSCULAR; INTRAVENOUS; SUBCUTANEOUS at 21:33

## 2019-09-17 RX ADMIN — SODIUM CHLORIDE 3 MILLILITER(S): 9 INJECTION INTRAMUSCULAR; INTRAVENOUS; SUBCUTANEOUS at 05:08

## 2019-09-17 RX ADMIN — Medication 6: at 12:58

## 2019-09-17 RX ADMIN — Medication 81 MILLIGRAM(S): at 12:21

## 2019-09-17 RX ADMIN — Medication 100 MILLIGRAM(S): at 21:30

## 2019-09-17 RX ADMIN — AMLODIPINE BESYLATE 10 MILLIGRAM(S): 2.5 TABLET ORAL at 05:07

## 2019-09-17 RX ADMIN — Medication 100 MILLIGRAM(S): at 18:22

## 2019-09-17 RX ADMIN — CHLORHEXIDINE GLUCONATE 1 APPLICATION(S): 213 SOLUTION TOPICAL at 05:07

## 2019-09-17 NOTE — PROGRESS NOTE ADULT - SUBJECTIVE AND OBJECTIVE BOX
VIOLETTA RENTERIA  MRN-[id number]  Patient is offers no complaints this AM.   physical Exam:  ICU Vital Signs Last 24 Hrs  T(C): 37 (17 Sep 2019 08:00), Max: 37 (17 Sep 2019 08:00)  T(F): 98.6 (17 Sep 2019 08:00), Max: 98.6 (17 Sep 2019 08:00)  HR: 57 (17 Sep 2019 10:00) (54 - 73)  BP: 127/60 (17 Sep 2019 10:00) (112/58 - 198/91)  BP(mean): 86 (17 Sep 2019 10:00) (80 - 130)  ABP: --  ABP(mean): --  RR: 19 (17 Sep 2019 10:00) (13 - 26)  SpO2: 96% (17 Sep 2019 10:00) (96% - 100%)    Gen:  Awake, alert   CNS: non focal 	  Neck: no JVD  RES : + bibasilar rales         CVS: Regular  rhythm. Normal S1/S2  Abd: Soft, non-distended. Bowel sounds present.  Skin: No rash.  Ext:  trace edema lower ext.      ============================I/O===========================  I&O's Detail    16 Sep 2019 07:01  -  17 Sep 2019 07:00  --------------------------------------------------------  IN:    heparin Infusion: 98 mL    Oral Fluid: 600 mL  Total IN: 698 mL    OUT:    Voided: 375 mL  Total OUT: 375 mL    Total NET: 323 mL      17 Sep 2019 07:01  -  17 Sep 2019 11:26  --------------------------------------------------------  IN:    heparin Infusion: 16 mL    Oral Fluid: 240 mL  Total IN: 256 mL    OUT:    Voided: 150 mL  Total OUT: 150 mL    Total NET: 106 mL      ============================ LABS =========================                        11.5   7.6   )-----------( 150      ( 17 Sep 2019 01:30 )             33.9   09-17    137  |  95<L>  |  68<H>  ----------------------------<  195<H>  3.9   |  24  |  8.66<H>    Ca    8.7      17 Sep 2019 01:30  Phos  6.4     09-17  Mg     1.9     09-17    TPro  6.2  /  Alb  3.5  /  TBili  0.2  /  DBili  x   /  AST  8<L>  /  ALT  8<L>  /  AlkPhos  70  09-17    CXR: Reviewed  cath report :Reviewed  ====================ASSESMENT ==============  Dyspnea  acute coronary sysndrome  ASHD/CAD/MI: Left Main disease   ESRD (end stage renal disease) on dialysis: T-Th-Sat  DM2  Hypertension  Hyperlipidemia    Plan:  anticoagulation with heparin drip, mionitor ptt; titrate to target PTT above 60  removal of R groin sheath; monitor fir bleed , while anticoagulation  oxygen supplemental                                                                                                                                                                                                                                                          amLODIPine   Tablet 10 milliGRAM(s) Oral daily  labetalol 200 milliGRAM(s) Oral every 8 hours  aspirin  chewable 81 milliGRAM(s) Oral daily  heparin  Infusion 1000 Unit(s)/Hr (10 mL/Hr) IV Continuous <Continuous>  docusate sodium 100 milliGRAM(s) Oral three times a day  pantoprazole    Tablet 40 milliGRAM(s) Oral before breakfast  atorvastatin 80 milliGRAM(s) Oral at bedtime  insulin lispro (HumaLOG) corrective regimen sliding scale   SubCutaneous three times a day before meals  insulin lispro (HumaLOG) corrective regimen sliding scale   SubCutaneous at bedtime  Patient requires continuous monitoring with bedside rhythm monitoring, ,pulse oximetry, ;intermittent blood gas analysis.  Care plan discussed with ICU care team. plan for surgery after tomorrow.     patient remains critical, with risk of decompensation;  I have spent 30 min.

## 2019-09-17 NOTE — CONSULT NOTE ADULT - PROBLEM SELECTOR RECOMMENDATION 9
Consent in chart  Scheduled for HD today and Thursday, due for CABG tomorrow  Lytes and volume status OK, after HD will be optimized for surgery tomorrow  Dose meds for ESRD  Renal diet  Daily BMP

## 2019-09-17 NOTE — PROGRESS NOTE ADULT - SUBJECTIVE AND OBJECTIVE BOX
Patient with episode of chest pain today that resolved s/p nitro. Denies shortness of breath. Review of systems otherwise (-)  	    MEDICATIONS  (STANDING):  amLODIPine   Tablet 10 milliGRAM(s) Oral daily  aspirin  chewable 81 milliGRAM(s) Oral daily  atorvastatin 80 milliGRAM(s) Oral at bedtime  chlorhexidine 0.12% Liquid 15 milliLiter(s) Swish and Spit once  chlorhexidine 2% Cloths 1 Application(s) Topical <User Schedule>  chlorhexidine 4% Liquid 1 Application(s) Topical once  docusate sodium 100 milliGRAM(s) Oral three times a day  heparin  Infusion 1000 Unit(s)/Hr (8 mL/Hr) IV Continuous <Continuous>  insulin lispro (HumaLOG) corrective regimen sliding scale   SubCutaneous three times a day before meals  insulin lispro (HumaLOG) corrective regimen sliding scale   SubCutaneous at bedtime  metoprolol tartrate 25 milliGRAM(s) Oral every 12 hours  nitroglycerin  Infusion 15 MICROgram(s)/Min (4.5 mL/Hr) IV Continuous <Continuous>  pantoprazole    Tablet 40 milliGRAM(s) Oral before breakfast  sevelamer carbonate 1600 milliGRAM(s) Oral three times a day with meals  sodium chloride 0.9% lock flush 3 milliLiter(s) IV Push every 8 hours    MEDICATIONS  (PRN):        LABS:	 	                          11.5   7.6   )-----------( 150      ( 17 Sep 2019 01:30 )             33.9     Hemoglobin: 11.5 g/dL ( @ 01:30)  Hemoglobin: 13.3 g/dL ( @ 17:12)  Hemoglobin: 12.3 g/dL ( @ 10:05)        137  |  95<L>  |  68<H>  ----------------------------<  195<H>  3.9   |  24  |  8.66<H>    Ca    8.7      17 Sep 2019 01:30  Phos  6.4       Mg     1.9         TPro  6.2  /  Alb  3.5  /  TBili  0.2  /  DBili  x   /  AST  8<L>  /  ALT  8<L>  /  AlkPhos  70      Creatinine Trend: 8.66<--, 8.24<--, 7.80<--  COAGS:   PT/INR - ( 17 Sep 2019 08:56 )   PT: 13.6 sec;   INR: 1.19 ratio         PTT - ( 17 Sep 2019 08:56 )  PTT:73.3 sec    proBNP: Serum Pro-Brain Natriuretic Peptide: 4254 pg/mL ( @ 17:12)    Lipid Profile:   HgA1c: Hemoglobin A1C, Whole Blood: 7.6 % ( @ 22:22)  Hemoglobin A1C, Whole Blood: 7.6 % ( 22:22)    TSH: Thyroid Stimulating Hormone, Serum: 0.36 uIU/mL ( @ 22:22)      PHYSICAL EXAM:  T(C): 36.7 (19 @ 12:00), Max: 37 (19 @ 08:00)  HR: 62 (19 @ 13:00) (54 - 73)  BP: 124/65 (19 @ 13:00) (112/58 - 198/91)  RR: 22 (19 @ 13:00) (13 - 28)  SpO2: 95% (19 @ 13:00) (95% - 100%)  Wt(kg): --  I&O's Summary    16 Sep 2019 07:  -  17 Sep 2019 07:00  --------------------------------------------------------  IN: 698 mL / OUT: 375 mL / NET: 323 mL    17 Sep 2019 07:  -  17 Sep 2019 13:31  --------------------------------------------------------  IN: 297 mL / OUT: 150 mL / NET: 147 mL      Height (cm): 167.6 ( @ 15:55)  Weight (kg): 83.7 ( 15:55)  BMI (kg/m2): 29.8 (09-16 @ 15:55)  BSA (m2): 1.93 ( @ 15:55)    Gen: Appears well in NAD  HEENT:  (-)icterus (-)pallor  CV: N S1 S2 1/6 DEJAH (+)2 Pulses B/l  Resp:  Clear to auscultation B/L, normal effort  GI: (+) BS Soft, NT, ND  Lymph:  (-)Edema, (-)obvious lymphadenopathy  Skin: Warm to touch, Normal turgor  Psych: Appropriate mood and affect      TELEMETRY: NSR 60s, PVC 	        < from: Cardiac Cath Lab - Adult (19 @ 12:28) >  PROCEDURE:  --  Left heart catheterization.  --  Left coronary angiography.  --  Right coronary angiography.  --  Sonosite - Diagnostic.  VENTRICLES: No LV gram was performed; however, a recent echocardiogram  demonstrated normal global and regional LV function.  CORONARY VESSELS: The coronary circulation is right dominant.  LM:   --  Ostial LM: There was a discrete 90 % stenosis.  LAD:   --  Mid LAD: Angiography showed minor luminal irregularities with no  flow limiting lesions. The stent in the mid LAD is patent.  --  Distal LAD: The vessel was small sized. Angiography showed moderate  atherosclerosis.  CX:   --  Mid circumflex: There was a tubular 50 % stenosis at the site of  a prior stent.  RI:   --  Proximal ramus intermedius: There was a diffuse 50 % stenosis at  the site of a prior stent.  RCA:   --  Mid RCA: There was a tubular 50 % stenosis at the site of a  prior stent.  --  RPLS: Angiography showed minor luminal irregularities with no flow  limiting lesions.  COMPLICATIONS: There were no complications.  DIAGNOSTIC RECOMMENDATIONS: Consultation with a cardiac surgeon be obtained  for surgical opinion and coronary artery bypass grafting.  Prepared and signed by  Jessy Méndez M.D.  Signed 2019 13:42:23  HEMODYNAMIC TABLES  Pressures:  NO PHASE  Pressures:  - HR: 69  Pressures:  - Rhythm:  Pressures:  -- Aortic Pressure (S/D/M): 170/70/113  Pressures:  -- Left Ventricle (s/edp): 164/28/--  Outputs:  NO PHASE  Outputs:  -- CALCULATIONS[age]  Outputs:  -- CALCULATIONS: Body Surface Area: 1.92  Outputs:  -- CALCULATIONS: Height in cm: 168.00  Outputs:  -- CALCULATIONS: Sex: Male  Outputs:  -- CALCULATIONS: Weight in k.60  Outputs:  -- OUTPUTS: O2 consumption: 239.38  Outputs:  -- OUTPUTS: Vo2 Indexed: 125.00    < end of copied text >        ASSESSMENT/PLAN: 68 y/o male with PMHx of CAD, HTN, HLD, T2DM, and ESRD on HD presented after abnormal stress test to [facility] for elective cardiac catheterization. Patient admitted post cath after cath revealed severe ostial LM disease. Now transferred to [facility] for CABG.  	  -Admitted post cath after cath revealed severe ostial LM disease and transferred to [facility] for CABG eval  -Continue ASA/Statin/BB  -Continue hep gtt  -Continue nitro gtt - monitor chest pain  -CTS eval for CABG  -HD per renal  -Supportive Care per CTU appreciated    Wilfred Robin PA-C  [facility]  Pager: [phone]

## 2019-09-17 NOTE — CONSULT NOTE ADULT - ASSESSMENT
69M ESRD on HD(Tu/Th/Sa), HTN, CAD s/p PCI with 15 stents placed, s/p cardiac cath today which showed LM disease with 90%, now transferred to [facility] for CT Surgery evaluation.

## 2019-09-17 NOTE — PROGRESS NOTE ADULT - ATTENDING COMMENTS
Patient seen and examined.  Agree with above.   -Cath with significant LM disease  -CABG per CTS      Kalie Lau MD

## 2019-09-17 NOTE — CONSULT NOTE ADULT - ATTENDING COMMENTS
discussed with patient in detail, all questions answered.  discussed with patient's son at the bedside in detail

## 2019-09-17 NOTE — CONSULT NOTE ADULT - SUBJECTIVE AND OBJECTIVE BOX
[facility] - NII Knight / NII Mendiola / JASWANT Arroyo/ NII Mackey/ NII Macias/ BRIGITTE Esposito / ARAMIS Calle / LUDA Bowen  -------------------------------------------------------------------------------------------------------  The patient seen and examined today.  HPI:  69 y.o. male with pmh of ESRD( HD on ) at [facility] via LUE AVF. Last dialyzed yesterday , was sent to [facility] for cath after presenting with SOB and a positive stress test. Presented today for elective cardiac catheterization. The patient c/o SOB with exertion (walking <1 block) started 2 months ago. The patient denies chest pain,  palpitations, dizziness, presyncope, syncope, b/l lower  extremities edema, abdominal pain, N/V/D/C, melena, hematochezia, h/o DVT, PE, AMY, fever, chills, urinary symptoms, hematuria.  The patient was evaluated by his cardiologist, was noted to have abnormal stress test (19) - EF 50%, fixed proximal and mid inferior and inferolateral wall perfusion defect c/w MI and mild to moderate jose martin-infarct ischemia.  Echo- 2019 - normal LV size and function, mild diastolic dysfunction, mild TR.  Due to patient's symptoms and abnormal non invasive tests, the patient was recommended to have cardiac catheterization. The patient denies any complaints at present.   Cardiac cath performed and revealed Ostial Left main stenosis of 90%, Mid Cx in stent 50% and RCA in stent 50%; Patent stent in LAD. Patient was transferred to [facility] for CT surgery eval and possible CABG with Dr. Herzog. (16 Sep 2019 16:43)      PAST MEDICAL & SURGICAL HISTORY:  ESRD (end stage renal disease) on dialysis: -Sat  Stented coronary artery: total 15 stents from 3344-1850  Hyperthyroidism  H/O: CVA: after cardiac stent placed 12/15/09 -no residual  Heart Attack: 07 with subsequent stent  Coronary Stent: 0501-3611 10 stents,  to Ramus 2015, cath 2016 ( see results in HPI)  Hypercholesterolemia  CAD (Coronary Artery Disease)  DM (Diabetes Mellitus): x 4 yrs without N/N/R  HTN (Hypertension)  Hemodialysis access, AV graft: 2015, L arm  S/P cataract extraction  Boil of Buttock:  and     Allergies :- lisinopril (Other)    Home Medications Reviewed  Hospital Medications:   MEDICATIONS  (STANDING):  amLODIPine   Tablet 10 milliGRAM(s) Oral daily  aspirin  chewable 81 milliGRAM(s) Oral daily  atorvastatin 80 milliGRAM(s) Oral at bedtime  chlorhexidine 0.12% Liquid 15 milliLiter(s) Swish and Spit once  chlorhexidine 2% Cloths 1 Application(s) Topical <User Schedule>  chlorhexidine 4% Liquid 1 Application(s) Topical once  docusate sodium 100 milliGRAM(s) Oral three times a day  heparin  Infusion 1000 Unit(s)/Hr (8 mL/Hr) IV Continuous <Continuous>  insulin lispro (HumaLOG) corrective regimen sliding scale   SubCutaneous three times a day before meals  insulin lispro (HumaLOG) corrective regimen sliding scale   SubCutaneous at bedtime  metoprolol tartrate 25 milliGRAM(s) Oral every 12 hours  pantoprazole    Tablet 40 milliGRAM(s) Oral before breakfast  sodium chloride 0.9% lock flush 3 milliLiter(s) IV Push every 8 hours    SOCIAL HISTORY:  Denies ETOh,Smoking,   FAMILY HISTORY:  Family history of coronary artery disease  Family history of diabetes mellitus (Sibling)      REVIEW OF SYSTEMS:  CONSTITUTIONAL: No weakness, fevers or chills  EYES/ENT: No visual changes;  No vertigo or throat pain   NECK: No pain or stiffness  RESPIRATORY: SOB +  CARDIOVASCULAR: Chest pain +  GASTROINTESTINAL: No abdominal or epigastric pain. No nausea, vomiting, or hematemesis; No diarrhea or constipation. No melena or hematochezia.  GENITOURINARY: No dysuria, frequency, foamy urine, urinary urgency, incontinence or hematuria  NEUROLOGICAL: No numbness or weakness  SKIN: No itching, burning, rashes, or lesions   VASCULAR: No bilateral lower extremity edema.   All other review of systems is negative unless indicated above.    VITALS:  T(F): 98.6 (19 @ 08:00), Max: 98.6 (19 @ 08:00)  HR: 57 (19 @ 10:00)  BP: 127/60 (19 @ 10:00)  RR: 19 (19 @ 10:00)  SpO2: 96% (19 @ 10:00)  Wt(kg): --     @ 07:01  -   @ 07:00  --------------------------------------------------------  IN: 698 mL / OUT: 375 mL / NET: 323 mL     @ 07:01  -   @ 11:17  --------------------------------------------------------  IN: 256 mL / OUT: 150 mL / NET: 106 mL      Height (cm): 167.6 ( @ 15:55)  Weight (kg): 83.7 ( @ 15:55)  BMI (kg/m2): 29.8 ( @ 15:55)  BSA (m2): 1.93 ( @ 15:55)    PHYSICAL EXAM:  Constitutional: NAD  HEENT: anicteric sclera, oropharynx clear, MMM  Neck: supple.   Respiratory: Bilateral equal breath sounds , no wheezes, no crackles  Cardiovascular: S1, S2, Regular, Murmur present.  Gastrointestinal: Bowel Sound present, soft, NT/ND  Extremities: No cyanosis or clubbing. No peripheral edema, good thrill on LUE AVF  Neurological: Alert and oriented x 3, no focal deficits  Psychiatric: Normal mood, normal affect  : No CVA tenderness. No wagner.   Skin: No rashes    Data:      137  |  95<L>  |  68<H>  ----------------------------<  195<H>  3.9   |  24  |  8.66<H>    Ca    8.7      17 Sep 2019 01:30  Phos  6.4       Mg     1.9         TPro  6.2  /  Alb  3.5  /  TBili  0.2  /  DBili      /  AST  8<L>  /  ALT  8<L>  /  AlkPhos  70      Creatinine Trend: 8.66 <--, 8.24 <--, 7.80 <--                        11.5   7.6   )-----------( 150      ( 17 Sep 2019 01:30 )             33.9     Urine Studies:  Urinalysis Basic - ( 16 Sep 2019 17:12 )    Color: Light Yellow / Appearance: Clear / S.016 / pH:   Gluc:  / Ketone: Negative  / Bili: Negative / Urobili: Negative   Blood:  / Protein: 100 mg/dL / Nitrite: Negative   Leuk Esterase: Negative / RBC: 1 /hpf / WBC 1 /HPF   Sq Epi:  / Non Sq Epi: 0 /hpf / Bacteria: Negative

## 2019-09-17 NOTE — CONSULT NOTE ADULT - SUBJECTIVE AND OBJECTIVE BOX
Patient is a 69y old  Male who presents with a chief complaint of dyspnea,  coronary artery atherosclerosis     HPI:    69 y.o. male with PMH of ESRD (HD on ) last dialyzed yesterday 9/15, was sent to [facility] for cath after presenting with SOB and a positive stress test. Presented today for elective cardiac catheterization. The patient c/o SOB with exertion (walking <1 block) started 2 months ago. The patient denies chest pain,  palpitations, dizziness, presyncope, syncope, b/l lower  extremities edema, abdominal pain, N/V/D/C, melena, hematochezia, h/o DVT, PE, AMY, fever, chills, urinary symptoms, hematuria.  The patient was evaluated by his cardiologist, was noted to have abnormal stress test (19) - EF 50%, fixed proximal and mid inferior and inferolateral wall perfusion defect c/w MI and mild to moderate jose martin-infarct ischemia.    PAST MEDICAL & SURGICAL HISTORY:  ESRD (end stage renal disease) on dialysis: T-Th-Sat  Stented coronary artery: total 15 stents from 8125-2996  Hyperthyroidism  H/O: CVA: after cardiac stent placed 12/15/09 -no residual  Heart Attack: 07 with subsequent stent  Coronary Stent: 5848-9903 10 stents,  to Ramus 2015, cath 2016 ( see results in HPI)  Hypercholesterolemia  CAD (Coronary Artery Disease)  DM (Diabetes Mellitus): x 4 yrs without N/N/R  HTN (Hypertension)  Hemodialysis access, AV graft: 2015, L arm  S/P cataract extraction  Boil of Buttock:  and       Review of Systems:     CONSTITUTIONAL: No fever, weight loss, or fatigue  EYES: No eye pain, visual disturbances, or discharge  ENMT:  No difficulty hearing, tinnitus, vertigo; No sinus or throat pain  NECK: No pain or stiffness  RESPIRATORY: No cough, wheezing, chills or hemoptysis; No shortness of breath  CARDIOVASCULAR: see above HPI  GASTROINTESTINAL: No abdominal or epigastric pain. No nausea, vomiting, or hematemesis; No diarrhea or constipation  GENITOURINARY: No dysuria, frequency, hematuria, or incontinence  NEUROLOGICAL: No headaches, memory loss, loss of strength, numbness, or tremors  SKIN: No itching, burning, rashes, or lesions   LYMPH NODES: No enlarged glands  ENDOCRINE: No heat or cold intolerance; No hair loss  MUSCULOSKELETAL: No joint pain or swelling; No muscle, back, or extremity pain  PSYCHIATRIC: No depression, anxiety, mood swings, or difficulty sleeping  HEME/LYMPH: No easy bruising, or bleeding gums  ALLERGY AND IMMUNOLOGIC: No hives or eczema    Allergies    lisinopril (Other)    Social History:     FAMILY HISTORY:  Family history of coronary artery disease  Family history of diabetes mellitus (Sibling)      MEDICATIONS  (STANDING):  amLODIPine   Tablet 10 milliGRAM(s) Oral daily  aspirin  chewable 81 milliGRAM(s) Oral daily  atorvastatin 80 milliGRAM(s) Oral at bedtime  chlorhexidine 0.12% Liquid 15 milliLiter(s) Swish and Spit once  chlorhexidine 2% Cloths 1 Application(s) Topical <User Schedule>  chlorhexidine 4% Liquid 1 Application(s) Topical once  docusate sodium 100 milliGRAM(s) Oral three times a day  heparin  Infusion 1000 Unit(s)/Hr (8 mL/Hr) IV Continuous <Continuous>  insulin lispro (HumaLOG) corrective regimen sliding scale   SubCutaneous three times a day before meals  insulin lispro (HumaLOG) corrective regimen sliding scale   SubCutaneous at bedtime  metoprolol tartrate 25 milliGRAM(s) Oral every 12 hours  pantoprazole    Tablet 40 milliGRAM(s) Oral before breakfast  sevelamer carbonate 1600 milliGRAM(s) Oral three times a day with meals  sodium chloride 0.9% lock flush 3 milliLiter(s) IV Push every 8 hours    MEDICATIONS  (PRN):      CAPILLARY BLOOD GLUCOSE      POCT Blood Glucose.: 180 mg/dL (17 Sep 2019 08:13)  POCT Blood Glucose.: 206 mg/dL (16 Sep 2019 21:57)  POCT Blood Glucose.: 116 mg/dL (16 Sep 2019 13:58)    I&O's Summary    16 Sep 2019 07:  -  17 Sep 2019 07:00  --------------------------------------------------------  IN: 698 mL / OUT: 375 mL / NET: 323 mL    17 Sep 2019 07:01  -  17 Sep 2019 11:35  --------------------------------------------------------  IN: 256 mL / OUT: 150 mL / NET: 106 mL        24hrs Vital:  T(C): 37 (19 @ 08:00), Max: 37 (19 @ 08:00)  HR: 57 (19 @ 10:00) (54 - 73)  BP: 127/60 (19 @ 10:00) (112/58 - 198/91)  RR: 19 (19 @ 10:00) (13 - 26)  SpO2: 96% (19 @ 10:00) (96% - 100%)    PHYSICAL EXAM:  GENERAL: NAD, well-developed  HEAD:  Atraumatic, Normocephalic  EYES: EOMI, PERRLA, conjunctiva and sclera clear  NECK: Supple, No JVD  CHEST/LUNG: Clear to auscultation bilaterally; No wheeze  HEART: S1S2; soft ejection systolic murmur best heard at left sternal border no rub no gallop   ABDOMEN: Soft, Nontender, Nondistended; Bowel sounds present  EXTREMITIES:  + Peripheral Pulses, No clubbing or cyanosis, no edema  PSYCH: AO x 3,   NEUROLOGY: Alert, no focal motor or sensory deficits  SKIN: No rashes or lesions    LABS:                        11.5   7.6   )-----------( 150      ( 17 Sep 2019 01:30 )             33.9         137  |  95<L>  |  68<H>  ----------------------------<  195<H>  3.9   |  24  |  8.66<H>    Ca    8.7      17 Sep 2019 01:30  Phos  6.4       Mg     1.9         TPro  6.2  /  Alb  3.5  /  TBili  0.2  /  DBili  x   /  AST  8<L>  /  ALT  8<L>  /  AlkPhos  70      PT/INR - ( 17 Sep 2019 08:56 )   PT: 13.6 sec;   INR: 1.19 ratio         PTT - ( 17 Sep 2019 08:56 )  PTT:73.3 sec  CARDIAC MARKERS ( 17 Sep 2019 05:26 )  x     / x     / 84 U/L / x     / 4.6 ng/mL  CARDIAC MARKERS ( 16 Sep 2019 17:12 )  x     / x     / 127 U/L / x     / 5.8 ng/mL      Urinalysis Basic - ( 16 Sep 2019 17:12 )    Color: Light Yellow / Appearance: Clear / S.016 / pH: x  Gluc: x / Ketone: Negative  / Bili: Negative / Urobili: Negative   Blood: x / Protein: 100 mg/dL / Nitrite: Negative   Leuk Esterase: Negative / RBC: 1 /hpf / WBC 1 /HPF   Sq Epi: x / Non Sq Epi: 0 /hpf / Bacteria: Negative        RADIOLOGY & ADDITIONAL TESTS:    Consultant(s) Notes Reviewed:      Care Discussed with Consultants/Other Providers:

## 2019-09-17 NOTE — CONSULT NOTE ADULT - ATTENDING COMMENTS
Thank you for this consult    For any question, call:  Cell # [phone]  Pager # [phone]  Callback # [phone]

## 2019-09-17 NOTE — CONSULT NOTE ADULT - ASSESSMENT
69 y.o. male with pmh of ESRD( HD on tu/Th/sa) at [facility] via LUE AVF. Last dialyzed yesterday 9/14, was sent to [facility] for cath after presenting with SOB and a positive stress test. Transferred to [facility] for evaluation for CABG.

## 2019-09-18 DIAGNOSIS — I10 ESSENTIAL (PRIMARY) HYPERTENSION: ICD-10-CM

## 2019-09-18 LAB
ALBUMIN SERPL ELPH-MCNC: 4.1 G/DL — SIGNIFICANT CHANGE UP (ref 3.3–5)
ALP SERPL-CCNC: 79 U/L — SIGNIFICANT CHANGE UP (ref 40–120)
ALT FLD-CCNC: 10 U/L — SIGNIFICANT CHANGE UP (ref 10–45)
ANION GAP SERPL CALC-SCNC: 14 MMOL/L — SIGNIFICANT CHANGE UP (ref 5–17)
APTT BLD: 44.3 SEC — HIGH (ref 27.5–36.3)
APTT BLD: 48.1 SEC — HIGH (ref 27.5–36.3)
APTT BLD: 62.9 SEC — HIGH (ref 27.5–36.3)
AST SERPL-CCNC: 7 U/L — LOW (ref 10–40)
BILIRUB SERPL-MCNC: 0.4 MG/DL — SIGNIFICANT CHANGE UP (ref 0.2–1.2)
BUN SERPL-MCNC: 38 MG/DL — HIGH (ref 7–23)
CALCIUM SERPL-MCNC: 9.6 MG/DL — SIGNIFICANT CHANGE UP (ref 8.4–10.5)
CHLORIDE SERPL-SCNC: 95 MMOL/L — LOW (ref 96–108)
CO2 SERPL-SCNC: 29 MMOL/L — SIGNIFICANT CHANGE UP (ref 22–31)
CREAT SERPL-MCNC: 6.59 MG/DL — HIGH (ref 0.5–1.3)
GLUCOSE BLDC GLUCOMTR-MCNC: 120 MG/DL — HIGH (ref 70–99)
GLUCOSE BLDC GLUCOMTR-MCNC: 157 MG/DL — HIGH (ref 70–99)
GLUCOSE BLDC GLUCOMTR-MCNC: 158 MG/DL — HIGH (ref 70–99)
GLUCOSE BLDC GLUCOMTR-MCNC: 168 MG/DL — HIGH (ref 70–99)
GLUCOSE BLDC GLUCOMTR-MCNC: 192 MG/DL — HIGH (ref 70–99)
GLUCOSE BLDC GLUCOMTR-MCNC: 193 MG/DL — HIGH (ref 70–99)
GLUCOSE SERPL-MCNC: 178 MG/DL — HIGH (ref 70–99)
HCT VFR BLD CALC: 38 % — LOW (ref 39–50)
HGB BLD-MCNC: 12.6 G/DL — LOW (ref 13–17)
INR BLD: 1.15 RATIO — SIGNIFICANT CHANGE UP (ref 0.88–1.16)
MAGNESIUM SERPL-MCNC: 1.9 MG/DL — SIGNIFICANT CHANGE UP (ref 1.6–2.6)
MCHC RBC-ENTMCNC: 29.9 PG — SIGNIFICANT CHANGE UP (ref 27–34)
MCHC RBC-ENTMCNC: 33.3 GM/DL — SIGNIFICANT CHANGE UP (ref 32–36)
MCV RBC AUTO: 89.8 FL — SIGNIFICANT CHANGE UP (ref 80–100)
PHOSPHATE SERPL-MCNC: 5.6 MG/DL — HIGH (ref 2.5–4.5)
PLATELET # BLD AUTO: 163 K/UL — SIGNIFICANT CHANGE UP (ref 150–400)
POTASSIUM SERPL-MCNC: 4.4 MMOL/L — SIGNIFICANT CHANGE UP (ref 3.5–5.3)
POTASSIUM SERPL-SCNC: 4.4 MMOL/L — SIGNIFICANT CHANGE UP (ref 3.5–5.3)
PROT SERPL-MCNC: 7.2 G/DL — SIGNIFICANT CHANGE UP (ref 6–8.3)
PROTHROM AB SERPL-ACNC: 13.2 SEC — HIGH (ref 10–12.9)
RBC # BLD: 4.23 M/UL — SIGNIFICANT CHANGE UP (ref 4.2–5.8)
RBC # FLD: 11.8 % — SIGNIFICANT CHANGE UP (ref 10.3–14.5)
SODIUM SERPL-SCNC: 138 MMOL/L — SIGNIFICANT CHANGE UP (ref 135–145)
WBC # BLD: 8.2 K/UL — SIGNIFICANT CHANGE UP (ref 3.8–10.5)
WBC # FLD AUTO: 8.2 K/UL — SIGNIFICANT CHANGE UP (ref 3.8–10.5)

## 2019-09-18 PROCEDURE — 99233 SBSQ HOSP IP/OBS HIGH 50: CPT

## 2019-09-18 RX ORDER — METOPROLOL TARTRATE 50 MG
25 TABLET ORAL EVERY 8 HOURS
Refills: 0 | Status: DISCONTINUED | OUTPATIENT
Start: 2019-09-18 | End: 2019-09-23

## 2019-09-18 RX ORDER — SODIUM CHLORIDE 9 MG/ML
3 INJECTION INTRAMUSCULAR; INTRAVENOUS; SUBCUTANEOUS EVERY 8 HOURS
Refills: 0 | Status: DISCONTINUED | OUTPATIENT
Start: 2019-09-23 | End: 2019-10-06

## 2019-09-18 RX ADMIN — SODIUM CHLORIDE 3 MILLILITER(S): 9 INJECTION INTRAMUSCULAR; INTRAVENOUS; SUBCUTANEOUS at 05:18

## 2019-09-18 RX ADMIN — AMLODIPINE BESYLATE 10 MILLIGRAM(S): 2.5 TABLET ORAL at 05:23

## 2019-09-18 RX ADMIN — Medication 25 MILLIGRAM(S): at 21:41

## 2019-09-18 RX ADMIN — ATORVASTATIN CALCIUM 80 MILLIGRAM(S): 80 TABLET, FILM COATED ORAL at 21:41

## 2019-09-18 RX ADMIN — CHLORHEXIDINE GLUCONATE 1 APPLICATION(S): 213 SOLUTION TOPICAL at 05:23

## 2019-09-18 RX ADMIN — SEVELAMER CARBONATE 1600 MILLIGRAM(S): 2400 POWDER, FOR SUSPENSION ORAL at 17:02

## 2019-09-18 RX ADMIN — Medication 100 MILLIGRAM(S): at 21:41

## 2019-09-18 RX ADMIN — Medication 2: at 08:49

## 2019-09-18 RX ADMIN — SEVELAMER CARBONATE 1600 MILLIGRAM(S): 2400 POWDER, FOR SUSPENSION ORAL at 07:41

## 2019-09-18 RX ADMIN — HEPARIN SODIUM 9 UNIT(S)/HR: 5000 INJECTION INTRAVENOUS; SUBCUTANEOUS at 15:45

## 2019-09-18 RX ADMIN — Medication 100 MILLIGRAM(S): at 05:23

## 2019-09-18 RX ADMIN — Medication 25 MILLIGRAM(S): at 17:02

## 2019-09-18 RX ADMIN — Medication 2: at 12:55

## 2019-09-18 RX ADMIN — HEPARIN SODIUM 10 UNIT(S)/HR: 5000 INJECTION INTRAVENOUS; SUBCUTANEOUS at 21:42

## 2019-09-18 RX ADMIN — SODIUM CHLORIDE 3 MILLILITER(S): 9 INJECTION INTRAMUSCULAR; INTRAVENOUS; SUBCUTANEOUS at 13:11

## 2019-09-18 RX ADMIN — SEVELAMER CARBONATE 1600 MILLIGRAM(S): 2400 POWDER, FOR SUSPENSION ORAL at 12:55

## 2019-09-18 RX ADMIN — PANTOPRAZOLE SODIUM 40 MILLIGRAM(S): 20 TABLET, DELAYED RELEASE ORAL at 07:13

## 2019-09-18 RX ADMIN — Medication 25 MILLIGRAM(S): at 05:23

## 2019-09-18 RX ADMIN — Medication 81 MILLIGRAM(S): at 12:55

## 2019-09-18 RX ADMIN — SODIUM CHLORIDE 3 MILLILITER(S): 9 INJECTION INTRAMUSCULAR; INTRAVENOUS; SUBCUTANEOUS at 21:44

## 2019-09-18 NOTE — PROGRESS NOTE ADULT - ASSESSMENT
69M ESRD on HD(Tu/Th/Sa), HTN, CAD s/p PCI with 15 stents placed, s/p cardiac cath today which showed LM disease with 90%, now transferred to [facility] for CT Surgery evaluation.

## 2019-09-18 NOTE — PROGRESS NOTE ADULT - SUBJECTIVE AND OBJECTIVE BOX
No CP or SOB. Review of systems otherwise (-)  	  MEDICATIONS  (STANDING):  amLODIPine   Tablet 10 milliGRAM(s) Oral daily  aspirin  chewable 81 milliGRAM(s) Oral daily  atorvastatin 80 milliGRAM(s) Oral at bedtime  chlorhexidine 0.12% Liquid 15 milliLiter(s) Swish and Spit once  chlorhexidine 2% Cloths 1 Application(s) Topical <User Schedule>  docusate sodium 100 milliGRAM(s) Oral three times a day  heparin  Infusion 1000 Unit(s)/Hr (8 mL/Hr) IV Continuous <Continuous>  insulin lispro (HumaLOG) corrective regimen sliding scale   SubCutaneous three times a day before meals  insulin lispro (HumaLOG) corrective regimen sliding scale   SubCutaneous at bedtime  metoprolol tartrate 25 milliGRAM(s) Oral every 12 hours  pantoprazole    Tablet 40 milliGRAM(s) Oral before breakfast  sevelamer carbonate 1600 milliGRAM(s) Oral three times a day with meals  sodium chloride 0.9% lock flush 3 milliLiter(s) IV Push every 8 hours    LABS:  CARDIAC MARKERS ( 17 Sep 2019 12:11 )  CK68 U/L / CKMBx     / Troponin Tx     /                      12.6   8.2   )-----------( 163      ( 18 Sep 2019 05:08 )             38.0   138  |  95<L>  |  38<H>  ----------------------------<  178<H>  4.4   |  29  |  6.59<H>    Ca    9.6      18 Sep 2019 05:08  Phos  5.6       Mg     1.9         TPro  7.2  /  Alb  4.1  /  TBili  0.4  /  DBili  x   /  AST  7<L>  /  ALT  10  /  AlkPhos  79      Creatinine Trend: 6.59<--, 8.66<--, 8.24<--, 7.80<--   PT/INR - ( 18 Sep 2019 05:08 )   PT: 13.2 sec;   INR: 1.15 ratio       PTT - ( 18 Sep 2019 05:08 )  PTT:62.9 sec  CARDIAC MARKERS ( 17 Sep 2019 12:11 )  x     / x     / 68 U/L / x     / x      CARDIAC MARKERS ( 17 Sep 2019 05:26 )  x     / x     / 84 U/L / x     / 4.6 ng/mL  CARDIAC MARKERS ( 16 Sep 2019 17:12 )  x     / x     / 127 U/L / x     / 5.8 ng/mL    PHYSICAL EXAM  Vital Signs Last 24 Hrs  T(C): 36.6 (18 Sep 2019 09:40), Max: 36.7 (17 Sep 2019 12:00)  T(F): 97.9 (18 Sep 2019 09:40), Max: 98 (17 Sep 2019 12:00)  HR: 59 (18 Sep 2019 09:40) (53 - 71)  BP: 155/73 (18 Sep 2019 09:40) (112/59 - 158/72)  BP(mean): 85 (18 Sep 2019 09:00) (79 - 109)  RR: 16 (18 Sep 2019 09:40) (16 - 28)  SpO2: 98% (18 Sep 2019 09:40) (94% - 100%)    Gen: Appears well in NAD  HEENT:  (-)icterus (-)pallor  CV: N S1 S2 1/6 DEJAH (+)2 Pulses B/l  Resp:  Clear to auscultation B/L, normal effort  GI: (+) BS Soft, NT, ND  Lymph:  (-)Edema, (-)obvious lymphadenopathy  Skin: Warm to touch, Normal turgor  Psych: Appropriate mood and affect    TELEMETRY: NSR 60s, PVC 	      < from: Cardiac Cath Lab - Adult (19 @ 12:28) >  PROCEDURE:  --  Left heart catheterization.  --  Left coronary angiography.  --  Right coronary angiography.  --  Sonosite - Diagnostic.  VENTRICLES: No LV gram was performed; however, a recent echocardiogram  demonstrated normal global and regional LV function.  CORONARY VESSELS: The coronary circulation is right dominant.  LM:   --  Ostial LM: There was a discrete 90 % stenosis.  LAD:   --  Mid LAD: Angiography showed minor luminal irregularities with no  flow limiting lesions. The stent in the mid LAD is patent.  --  Distal LAD: The vessel was small sized. Angiography showed moderate  atherosclerosis.  CX:   --  Mid circumflex: There was a tubular 50 % stenosis at the site of  a prior stent.  RI:   --  Proximal ramus intermedius: There was a diffuse 50 % stenosis at  the site of a prior stent.  RCA:   --  Mid RCA: There was a tubular 50 % stenosis at the site of a  prior stent.  --  RPLS: Angiography showed minor luminal irregularities with no flow  limiting lesions.  COMPLICATIONS: There were no complications.  DIAGNOSTIC RECOMMENDATIONS: Consultation with a cardiac surgeon be obtained  for surgical opinion and coronary artery bypass grafting.  Prepared and signed by  Jessy Méndez M.D.  Signed 2019 13:42:23  HEMODYNAMIC TABLES  Pressures:  NO PHASE  Pressures:  - HR: 69  Pressures:  - Rhythm:  Pressures:  -- Aortic Pressure (S/D/M): 170/70/113  Pressures:  -- Left Ventricle (s/edp): 164/28/--  Outputs:  NO PHASE  Outputs:  -- CALCULATIONS[age]  Outputs:  -- CALCULATIONS: Body Surface Area: 1.92  Outputs:  -- CALCULATIONS: Height in cm: 168.00  Outputs:  -- CALCULATIONS: Sex: Male  Outputs:  -- CALCULATIONS: Weight in k.60  Outputs:  -- OUTPUTS: O2 consumption: 239.38  Outputs:  -- OUTPUTS: Vo2 Indexed: 125.00  < end of copied text >    ASSESSMENT/PLAN: 68 y/o male with PMHx of CAD, HTN, HLD, T2DM, and ESRD on HD presented after abnormal stress test to [facility] for elective cardiac catheterization. Patient admitted post cath after cath revealed severe ostial LM disease. Now transferred to [facility] for CABG.  	  -Cath revealed severe ostial LM disease  -Continue ASA/Statin/BB  -Continue hep gtt  -await CTS plan for CABG

## 2019-09-18 NOTE — PROGRESS NOTE ADULT - SUBJECTIVE AND OBJECTIVE BOX
Patient is a 69y old  Male who presents with a chief complaint of Coronary Artery Disease (18 Sep 2019 09:05)      SUBJECTIVE / OVERNIGHT EVENTS: overnight events noted    ROS:  Resp: No cough no sputum production  CVS: No chest pain no palpitations no orthopnea  GI: no N/V/D  : no dysuria, no hematuria  Neuro: no weakness no paresthesias  Heme: No petechiae no easy bruising  Msk: No joint pain no swelling  Skin: No rash no itching        MEDICATIONS  (STANDING):  amLODIPine   Tablet 10 milliGRAM(s) Oral daily  aspirin  chewable 81 milliGRAM(s) Oral daily  atorvastatin 80 milliGRAM(s) Oral at bedtime  chlorhexidine 0.12% Liquid 15 milliLiter(s) Swish and Spit once  chlorhexidine 2% Cloths 1 Application(s) Topical <User Schedule>  docusate sodium 100 milliGRAM(s) Oral three times a day  heparin  Infusion 1000 Unit(s)/Hr (8 mL/Hr) IV Continuous <Continuous>  insulin lispro (HumaLOG) corrective regimen sliding scale   SubCutaneous three times a day before meals  insulin lispro (HumaLOG) corrective regimen sliding scale   SubCutaneous at bedtime  metoprolol tartrate 25 milliGRAM(s) Oral every 12 hours  pantoprazole    Tablet 40 milliGRAM(s) Oral before breakfast  sevelamer carbonate 1600 milliGRAM(s) Oral three times a day with meals  sodium chloride 0.9% lock flush 3 milliLiter(s) IV Push every 8 hours    MEDICATIONS  (PRN):        CAPILLARY BLOOD GLUCOSE      POCT Blood Glucose.: 158 mg/dL (18 Sep 2019 08:49)  POCT Blood Glucose.: 168 mg/dL (18 Sep 2019 07:36)  POCT Blood Glucose.: 192 mg/dL (18 Sep 2019 07:34)  POCT Blood Glucose.: 174 mg/dL (17 Sep 2019 21:36)  POCT Blood Glucose.: 115 mg/dL (17 Sep 2019 18:11)  POCT Blood Glucose.: 257 mg/dL (17 Sep 2019 12:52)    I&O's Summary    17 Sep 2019 07:01  -  18 Sep 2019 07:00  --------------------------------------------------------  IN: 1167 mL / OUT: 5900 mL / NET: -4733 mL    18 Sep 2019 07:01  -  18 Sep 2019 12:16  --------------------------------------------------------  IN: 8 mL / OUT: 0 mL / NET: 8 mL        Vital Signs Last 24 Hrs  T(C): 36.6 (18 Sep 2019 09:40), Max: 36.6 (17 Sep 2019 16:00)  T(F): 97.9 (18 Sep 2019 09:40), Max: 97.9 (17 Sep 2019 23:00)  HR: 59 (18 Sep 2019 09:40) (53 - 71)  BP: 155/73 (18 Sep 2019 09:40) (112/59 - 155/78)  BP(mean): 85 (18 Sep 2019 09:00) (79 - 109)  RR: 16 (18 Sep 2019 09:40) (16 - 28)  SpO2: 98% (18 Sep 2019 09:40) (94% - 100%)    PHYSICAL EXAM:  GENERAL: NAD, well-developed  HEAD:  Atraumatic, Normocephalic  EYES: EOMI, PERRLA, conjunctiva and sclera clear  NECK: Supple, No JVD  CHEST/LUNG: Clear to auscultation bilaterally; No wheeze  HEART: S1S2; soft ejection systolic murmur best heard at left sternal border no rub no gallop   ABDOMEN: Soft, Nontender, Nondistended; Bowel sounds present  EXTREMITIES:  + Peripheral Pulses, No clubbing or cyanosis, no edema  PSYCH: AO x 3,   NEUROLOGY: Alert, no focal motor or sensory deficits  SKIN: No rashes or lesions    LABS:                        12.6   8.2   )-----------( 163      ( 18 Sep 2019 05:08 )             38.0     09-18    138  |  95<L>  |  38<H>  ----------------------------<  178<H>  4.4   |  29  |  6.59<H>    Ca    9.6      18 Sep 2019 05:08  Phos  5.6     09-18  Mg     1.9         TPro  7.2  /  Alb  4.1  /  TBili  0.4  /  DBili  x   /  AST  7<L>  /  ALT  10  /  AlkPhos  79      PT/INR - ( 18 Sep 2019 05:08 )   PT: 13.2 sec;   INR: 1.15 ratio         PTT - ( 18 Sep 2019 11:21 )  PTT:44.3 sec  CARDIAC MARKERS ( 17 Sep 2019 12:11 )  x     / x     / 68 U/L / x     / x      CARDIAC MARKERS ( 17 Sep 2019 05:26 )  x     / x     / 84 U/L / x     / 4.6 ng/mL  CARDIAC MARKERS ( 16 Sep 2019 17:12 )  x     / x     / 127 U/L / x     / 5.8 ng/mL      Urinalysis Basic - ( 16 Sep 2019 17:12 )    Color: Light Yellow / Appearance: Clear / S.016 / pH: x  Gluc: x / Ketone: Negative  / Bili: Negative / Urobili: Negative   Blood: x / Protein: 100 mg/dL / Nitrite: Negative   Leuk Esterase: Negative / RBC: 1 /hpf / WBC 1 /HPF   Sq Epi: x / Non Sq Epi: 0 /hpf / Bacteria: Negative          All consultant(s) notes reviewed and care discussed with other providers        Contact Number, Dr Cagle [phone]

## 2019-09-18 NOTE — PROGRESS NOTE ADULT - SUBJECTIVE AND OBJECTIVE BOX
VIOLETTA RENTERIA  MRN-[id number]    Patient is a 69y old  Male who presents with a chief complaint of CAD transferred for CABG (17 Sep 2019 11:34)    HPI:  69 y.o. male with pmh of ESRD( HD on ) Last dialyzed 9/15, was found to have significant coronary artery disease and is scheduled for CABG. Per history, he was sent to [facility] for cath after presenting with SOB and a positive stress test. Presented today for elective cardiac catheterization. The patient c/o SOB with exertion (walking <1 block) started 2 months ago. The patient denies chest pain,  palpitations, dizziness, presyncope, syncope, b/l lower  extremities edema, abdominal pain, N/V/D/C, melena, hematochezia, h/o DVT, PE, AMY, fever, chills, urinary symptoms, hematuria.  The patient was evaluated by his cardiologist, was noted to have abnormal stress test (19) - EF 50%, fixed proximal and mid inferior and inferolateral wall perfusion defect c/w MI and mild to moderate jose martin-infarct ischemia.  Echo- 2019 - normal LV size and function, mild diastolic dysfunction, mild TR. Due to patient's symptoms and abnormal non invasive tests, the patient was recommended to have cardiac catheterization. The patient denies any complaints at present. Cardiac cath performed and revealed ostial Left main stenosis of 90%, mid Cx in stent 50% and RCA in stent 50%; patent stent in LAD. Patient was transferred to [facility] for CT surgery eval and CABG. He has no complaints.    PAST MEDICAL & SURGICAL HISTORY:  ESRD (end stage renal disease) on dialysis: -Sat  Stented coronary artery: total 15 stents from [id number]  Hyperthyroidism  H/O: CVA: after cardiac stent placed 12/15/09 -no residual  Heart Attack: 07 with subsequent stent  Coronary Stent: [id number] 10 stents,  to Ramus 2015, cath 2016 ( see results in HPI)  Hypercholesterolemia  CAD (Coronary Artery Disease)  DM (Diabetes Mellitus): x 4 yrs without N/N/R  HTN (Hypertension)  Hemodialysis access, AV graft: 2015, L arm  S/P cataract extraction  Boil of Buttock:  and     FAMILY HISTORY:  Family history of coronary artery disease  Family history of diabetes mellitus (Sibling)    Social History:  Denies smoking, illicit drug use or frequent alcohol consumption    Allergies  Lisinopril (Other)    MEDICATIONS  (STANDING):  amLODIPine   Tablet 10 milliGRAM(s) Oral daily  aspirin  chewable 81 milliGRAM(s) Oral daily  atorvastatin 80 milliGRAM(s) Oral at bedtime  chlorhexidine 0.12% Liquid 15 milliLiter(s) Swish and Spit once  chlorhexidine 2% Cloths 1 Application(s) Topical <User Schedule>  docusate sodium 100 milliGRAM(s) Oral three times a day  heparin  Infusion 1000 Unit(s)/Hr (8 mL/Hr) IV Continuous <Continuous>  insulin lispro (HumaLOG) corrective regimen sliding scale   SubCutaneous three times a day before meals  insulin lispro (HumaLOG) corrective regimen sliding scale   SubCutaneous at bedtime  metoprolol tartrate 25 milliGRAM(s) Oral every 12 hours  pantoprazole    Tablet 40 milliGRAM(s) Oral before breakfast  sevelamer carbonate 1600 milliGRAM(s) Oral three times a day with meals  sodium chloride 0.9% lock flush 3 milliLiter(s) IV Push every 8 hours    Review of Systems:  Constitutional:  Negative for weight change, fever, malaise  HEENT:  Negative for sinus pain, hoarseness, sore throat, dysphagia, vision changes  Cardiovascular:  Negative for chest pain, palpitations, dizziness  Respiratory:  Negative for cough, wheezing, dyspnea  Gastrointestinal:  Negative for nausea, vomiting, diarrhea, melena  Musculoskeletal:  Negative for pain, swelling, stiffness   Neuro:  Negative for weakness, numbness, headache  Psych:  Negative for anxiety, depression  Endocrine:  Negative for polyuria, polydipsia, temperature Intolerance    All other systems negative unless otherwise stated    ICU Vital Signs Last 24 Hrs  T(C): 36.6 (18 Sep 2019 08:00), Max: 36.7 (17 Sep 2019 12:00)  T(F): 97.8 (18 Sep 2019 08:00), Max: 98 (17 Sep 2019 12:00)  HR: 58 (18 Sep 2019 09:00) (53 - 71)  BP: 131/59 (18 Sep 2019 09:00) (112/59 - 158/72)  BP(mean): 85 (18 Sep 2019 09:00) (79 - 109)  ABP: --  ABP(mean): --  RR: 21 (18 Sep 2019 09:00) (18 - 28)  SpO2: 99% (18 Sep 2019 09:00) (94% - 100%)    Daily     Daily Weight in k.7 (17 Sep 2019 16:50)  I&O's Summary    17 Sep 2019 07:01  -  18 Sep 2019 07:00  --------------------------------------------------------  IN: 1167 mL / OUT: 5900 mL / NET: -4733 mL    18 Sep 2019 07:01  -  18 Sep 2019 09:06  --------------------------------------------------------  IN: 8 mL / OUT: 0 mL / NET: 8 mL        Physical Exam:  Gen: Alert, no apparent distress  CV: Regular rate and rhythm no murmurs, rubs or gallops  Pulm: Clear to auscultation bilaterally, no rales, rhonchi or wheezes  GI: Abd is soft, non-tender and non-distended with +BS  Ext: No clubbing, cyanosis or edema  Neuro: A+Ox3, follows commands and moves all extremities    Labs:                          12.6   8.2   )-----------( 163      ( 18 Sep 2019 05:08 )             38.0       09-18    138  |  95<L>  |  38<H>  ----------------------------<  178<H>  4.4   |  29  |  6.59<H>    Ca    9.6      18 Sep 2019 05:08  Phos  5.6     09-18  Mg     1.9     -18    TPro  7.2  /  Alb  4.1  /  TBili  0.4  /  DBili  x   /  AST  7<L>  /  ALT  10  /  AlkPhos  79  09-18          PT/INR - ( 18 Sep 2019 05:08 )   PT: 13.2 sec;   INR: 1.15 ratio         PTT - ( 18 Sep 2019 05:08 )  PTT:62.9 sec    Assessment/Plan: 69 y.o. male with pmh of ESRD( HD on ) Last dialyzed 9/15, was found to have significant coronary artery disease and is scheduled for CABG.    Neuro:   Pain control with Tylenol prn                                          Cardiovascular:    Stable hemodynamics  Not on any pressors  ASA, Lipitor, Lopressor  Norvasc for hypertension  Continue hemodynamic monitoring    Respiratory:  Pt is comfortable on nasal cannula    GI:  Tolerating DASH diet  Continue bowel regimen, Protonix  Continue Zofran for nausea - PRN	          Renal:  HD, 5L off yesterday  Monitor I/Os and electrolytes    Hematologic / Oncology:  No signs of active bleeding, H/H stable  HSQ for DVT ppl    Infectious disease:  No fever or other signs of infection     Endocrine:  Continue Accuchecks with coverage    All clinical, lab, hemodynamic and radiographic data were reviewed and the plan was discussed with CTICU team.     Migel Reinoso MD

## 2019-09-18 NOTE — PROGRESS NOTE ADULT - PROBLEM SELECTOR PLAN 1
S/P HD yesterday, tolerated well  due for HD tomorrow and possible CABG today  Daily BMP  Renal diet  dose meds for ESRD

## 2019-09-18 NOTE — PROGRESS NOTE ADULT - SUBJECTIVE AND OBJECTIVE BOX
[facility] - NII Knight / NII Mendiola / JASWANT Arroyo/ NII Mackey/ NII Macias/ BRIGITTE Esposito / ARAMIS Calle / LUDA Bowen  ---------------------------------------------------------------------------------------------------------------  seen and examined today for ESRD on HD  Interval : no complaints today  VITALS:  T(F): 97.9 (09-18-19 @ 09:40), Max: 98 (09-17-19 @ 12:00)  HR: 59 (09-18-19 @ 09:40)  BP: 155/73 (09-18-19 @ 09:40)  RR: 16 (09-18-19 @ 09:40)  SpO2: 98% (09-18-19 @ 09:40)  Wt(kg): --    09-17 @ 07:01  -  09-18 @ 07:00  --------------------------------------------------------  IN: 1167 mL / OUT: 5900 mL / NET: -4733 mL    09-18 @ 07:01  -  09-18 @ 09:56  --------------------------------------------------------  IN: 8 mL / OUT: 0 mL / NET: 8 mL      Physical Exam :-  Constitutional: NAD  Neck: Supple.  Respiratory: Bilateral equal breath sounds,  Cardiovascular: S1, S2 normal,  Gastrointestinal: Bowel Sounds present, soft, non tender.  Extremities: No edema  Neurological: Alert and Oriented x 3, no focal deficits  Psychiatric: Normal mood, normal affect  Data:-  Allergies :   lisinopril (Other)    Hospital Medications:   MEDICATIONS  (STANDING):  amLODIPine   Tablet 10 milliGRAM(s) Oral daily  aspirin  chewable 81 milliGRAM(s) Oral daily  atorvastatin 80 milliGRAM(s) Oral at bedtime  chlorhexidine 0.12% Liquid 15 milliLiter(s) Swish and Spit once  chlorhexidine 2% Cloths 1 Application(s) Topical <User Schedule>  docusate sodium 100 milliGRAM(s) Oral three times a day  heparin  Infusion 1000 Unit(s)/Hr (8 mL/Hr) IV Continuous <Continuous>  insulin lispro (HumaLOG) corrective regimen sliding scale   SubCutaneous three times a day before meals  insulin lispro (HumaLOG) corrective regimen sliding scale   SubCutaneous at bedtime  metoprolol tartrate 25 milliGRAM(s) Oral every 12 hours  pantoprazole    Tablet 40 milliGRAM(s) Oral before breakfast  sevelamer carbonate 1600 milliGRAM(s) Oral three times a day with meals  sodium chloride 0.9% lock flush 3 milliLiter(s) IV Push every 8 hours    09-18    138  |  95<L>  |  38<H>  ----------------------------<  178<H>  4.4   |  29  |  6.59<H>    Ca    9.6      18 Sep 2019 05:08  Phos  5.6     09-18  Mg     1.9     09-18    TPro  7.2  /  Alb  4.1  /  TBili  0.4  /  DBili      /  AST  7<L>  /  ALT  10  /  AlkPhos  79  09-18    Creatinine Trend: 6.59 <--, 8.66 <--, 8.24 <--, 7.80 <--                        12.6   8.2   )-----------( 163      ( 18 Sep 2019 05:08 )             38.0

## 2019-09-18 NOTE — PROGRESS NOTE ADULT - ASSESSMENT
69 y.o. male with pmh of ESRD( HD on tu/Th/sa) at [facility] via [facility]. Last dialyzed yesterday 9/14, was sent to [facility] for cath after presenting with SOB and a positive stress test. Transferred to [facility] for evaluation for CABG.

## 2019-09-18 NOTE — PROGRESS NOTE ADULT - SUBJECTIVE AND OBJECTIVE BOX
S;  feels ok.  denies cp    Telemetry:  SR/SB 55-70    Vital Signs Last 24 Hrs  T(C): 36.9 (19 @ 13:58), Max: 36.9 (19 @ 13:58)  T(F): 98.4 (19 @ 13:58), Max: 98.4 (19 @ 13:58)  HR: 59 (19 @ 13:58) (53 - 71)  BP: 149/68 (19 @ 13:58) (115/56 - 155/78)  RR: 18 (19 @ 13:58) (16 - 28)  SpO2: 92% (19 @ 13:58) (92% - 100%)                    @ 07:01  -   @ 07:00  --------------------------------------------------------  IN: 1167 mL / OUT: 5900 mL / NET: -4733 mL     @ 07:01  -   @ 15:29  --------------------------------------------------------  IN: 368 mL / OUT: 0 mL / NET: 368 mL          Daily     Daily Weight in k.2 (18 Sep 2019 13:00)        CAPILLARY BLOOD GLUCOSE      POCT Blood Glucose.: 193 mg/dL (18 Sep 2019 12:52)  POCT Blood Glucose.: 158 mg/dL (18 Sep 2019 08:49)  POCT Blood Glucose.: 168 mg/dL (18 Sep 2019 07:36)  POCT Blood Glucose.: 192 mg/dL (18 Sep 2019 07:34)  POCT Blood Glucose.: 174 mg/dL (17 Sep 2019 21:36)  POCT Blood Glucose.: 115 mg/dL (17 Sep 2019 18:11)                                    12.6   8.2   )-----------( 163      ( 18 Sep 2019 05:08 )             38.0       -18    138  |  95<L>  |  38<H>  ----------------------------<  178<H>  4.4   |  29  |  6.59<H>    Ca    9.6      18 Sep 2019 05:08  Phos  5.6     18  Mg     1.9         TPro  7.2  /  Alb  4.1  /  TBili  0.4  /  DBili  x   /  AST  7<L>  /  ALT  10  /  AlkPhos  79  18      PT/INR - ( 18 Sep 2019 05:08 )   PT: 13.2 sec;   INR: 1.15 ratio         PTT - ( 18 Sep 2019 11:21 )  PTT:44.3 sec    PHYSICAL EXAM:    Neurology: alert and oriented x 3, nonfocal, no gross deficits    CV :  S1S2 heard RRR    Lungs:  clear to ausc.  No w/r/r    Abdomen: soft, nontender, nondistended, positive bowel sounds,           Extremities:     no edema          amLODIPine   Tablet 10 milliGRAM(s) Oral daily  aspirin  chewable 81 milliGRAM(s) Oral daily  atorvastatin 80 milliGRAM(s) Oral at bedtime  chlorhexidine 0.12% Liquid 15 milliLiter(s) Swish and Spit once  chlorhexidine 2% Cloths 1 Application(s) Topical <User Schedule>  docusate sodium 100 milliGRAM(s) Oral three times a day  heparin  Infusion 1000 Unit(s)/Hr IV Continuous <Continuous>  insulin lispro (HumaLOG) corrective regimen sliding scale   SubCutaneous three times a day before meals  insulin lispro (HumaLOG) corrective regimen sliding scale   SubCutaneous at bedtime  metoprolol tartrate 25 milliGRAM(s) Oral every 12 hours  pantoprazole    Tablet 40 milliGRAM(s) Oral before breakfast  sevelamer carbonate 1600 milliGRAM(s) Oral three times a day with meals  sodium chloride 0.9% lock flush 3 milliLiter(s) IV Push every 8 hours                                  Discussed with Cardiothoracic Team at AM rounds.

## 2019-09-18 NOTE — PROGRESS NOTE ADULT - ASSESSMENT
69M ESRD on HD(Tu/Th/Sa), HTN, CAD s/p PCI with 15 stents placed, s/p cardiac cath today which showed LM disease with 90%.  Pre op for CABG 9/23.  Heparin gtt

## 2019-09-19 LAB
ALBUMIN SERPL ELPH-MCNC: 3.5 G/DL — SIGNIFICANT CHANGE UP (ref 3.3–5)
ALP SERPL-CCNC: 75 U/L — SIGNIFICANT CHANGE UP (ref 40–120)
ALT FLD-CCNC: 5 U/L — LOW (ref 10–45)
ANION GAP SERPL CALC-SCNC: 16 MMOL/L — SIGNIFICANT CHANGE UP (ref 5–17)
APTT BLD: 54.2 SEC — HIGH (ref 27.5–36.3)
APTT BLD: 55.2 SEC — HIGH (ref 27.5–36.3)
APTT BLD: 63.9 SEC — HIGH (ref 27.5–36.3)
AST SERPL-CCNC: 6 U/L — LOW (ref 10–40)
BILIRUB SERPL-MCNC: 0.3 MG/DL — SIGNIFICANT CHANGE UP (ref 0.2–1.2)
BUN SERPL-MCNC: 59 MG/DL — HIGH (ref 7–23)
CALCIUM SERPL-MCNC: 8.9 MG/DL — SIGNIFICANT CHANGE UP (ref 8.4–10.5)
CHLORIDE SERPL-SCNC: 94 MMOL/L — LOW (ref 96–108)
CO2 SERPL-SCNC: 24 MMOL/L — SIGNIFICANT CHANGE UP (ref 22–31)
CREAT SERPL-MCNC: 8.2 MG/DL — HIGH (ref 0.5–1.3)
GLUCOSE BLDC GLUCOMTR-MCNC: 142 MG/DL — HIGH (ref 70–99)
GLUCOSE BLDC GLUCOMTR-MCNC: 159 MG/DL — HIGH (ref 70–99)
GLUCOSE BLDC GLUCOMTR-MCNC: 198 MG/DL — HIGH (ref 70–99)
GLUCOSE BLDC GLUCOMTR-MCNC: 96 MG/DL — SIGNIFICANT CHANGE UP (ref 70–99)
GLUCOSE SERPL-MCNC: 141 MG/DL — HIGH (ref 70–99)
HCT VFR BLD CALC: 34.8 % — LOW (ref 39–50)
HGB BLD-MCNC: 11.6 G/DL — LOW (ref 13–17)
MCHC RBC-ENTMCNC: 29.8 PG — SIGNIFICANT CHANGE UP (ref 27–34)
MCHC RBC-ENTMCNC: 33.3 GM/DL — SIGNIFICANT CHANGE UP (ref 32–36)
MCV RBC AUTO: 89.5 FL — SIGNIFICANT CHANGE UP (ref 80–100)
PLATELET # BLD AUTO: 163 K/UL — SIGNIFICANT CHANGE UP (ref 150–400)
POTASSIUM SERPL-MCNC: 4.2 MMOL/L — SIGNIFICANT CHANGE UP (ref 3.5–5.3)
POTASSIUM SERPL-SCNC: 4.2 MMOL/L — SIGNIFICANT CHANGE UP (ref 3.5–5.3)
PROT SERPL-MCNC: 6.7 G/DL — SIGNIFICANT CHANGE UP (ref 6–8.3)
RBC # BLD: 3.89 M/UL — LOW (ref 4.2–5.8)
RBC # FLD: 11.4 % — SIGNIFICANT CHANGE UP (ref 10.3–14.5)
SODIUM SERPL-SCNC: 134 MMOL/L — LOW (ref 135–145)
WBC # BLD: 9.2 K/UL — SIGNIFICANT CHANGE UP (ref 3.8–10.5)
WBC # FLD AUTO: 9.2 K/UL — SIGNIFICANT CHANGE UP (ref 3.8–10.5)

## 2019-09-19 PROCEDURE — 99232 SBSQ HOSP IP/OBS MODERATE 35: CPT

## 2019-09-19 RX ORDER — TIOTROPIUM BROMIDE 18 UG/1
1 CAPSULE ORAL; RESPIRATORY (INHALATION) DAILY
Refills: 0 | Status: DISCONTINUED | OUTPATIENT
Start: 2019-09-19 | End: 2019-09-23

## 2019-09-19 RX ORDER — HEPARIN SODIUM 5000 [USP'U]/ML
1100 INJECTION INTRAVENOUS; SUBCUTANEOUS
Qty: 25000 | Refills: 0 | Status: DISCONTINUED | OUTPATIENT
Start: 2019-09-19 | End: 2019-09-23

## 2019-09-19 RX ORDER — HEPARIN SODIUM 5000 [USP'U]/ML
INJECTION INTRAVENOUS; SUBCUTANEOUS
Qty: 25000 | Refills: 0 | Status: DISCONTINUED | OUTPATIENT
Start: 2019-09-19 | End: 2019-09-19

## 2019-09-19 RX ORDER — KETOTIFEN FUMARATE 0.34 MG/ML
1 SOLUTION OPHTHALMIC
Refills: 0 | Status: DISCONTINUED | OUTPATIENT
Start: 2019-09-19 | End: 2019-09-23

## 2019-09-19 RX ORDER — ALBUTEROL 90 UG/1
2 AEROSOL, METERED ORAL EVERY 4 HOURS
Refills: 0 | Status: DISCONTINUED | OUTPATIENT
Start: 2019-09-19 | End: 2019-09-23

## 2019-09-19 RX ORDER — GLYCOPYRROLATE AND FORMOTEROL FUMARATE 9; 4.8 UG/1; UG/1
2 AEROSOL, METERED RESPIRATORY (INHALATION)
Refills: 0 | Status: DISCONTINUED | OUTPATIENT
Start: 2019-09-19 | End: 2019-09-23

## 2019-09-19 RX ADMIN — Medication 25 MILLIGRAM(S): at 14:43

## 2019-09-19 RX ADMIN — PANTOPRAZOLE SODIUM 40 MILLIGRAM(S): 20 TABLET, DELAYED RELEASE ORAL at 05:17

## 2019-09-19 RX ADMIN — HEPARIN SODIUM 1100 UNIT(S)/HR: 5000 INJECTION INTRAVENOUS; SUBCUTANEOUS at 21:01

## 2019-09-19 RX ADMIN — Medication 100 MILLIGRAM(S): at 22:55

## 2019-09-19 RX ADMIN — Medication 2: at 17:36

## 2019-09-19 RX ADMIN — SODIUM CHLORIDE 3 MILLILITER(S): 9 INJECTION INTRAMUSCULAR; INTRAVENOUS; SUBCUTANEOUS at 14:28

## 2019-09-19 RX ADMIN — Medication 100 MILLIGRAM(S): at 14:42

## 2019-09-19 RX ADMIN — SODIUM CHLORIDE 3 MILLILITER(S): 9 INJECTION INTRAMUSCULAR; INTRAVENOUS; SUBCUTANEOUS at 05:54

## 2019-09-19 RX ADMIN — SEVELAMER CARBONATE 1600 MILLIGRAM(S): 2400 POWDER, FOR SUSPENSION ORAL at 12:37

## 2019-09-19 RX ADMIN — Medication 2: at 08:11

## 2019-09-19 RX ADMIN — HEPARIN SODIUM 1100 UNIT(S)/HR: 5000 INJECTION INTRAVENOUS; SUBCUTANEOUS at 14:43

## 2019-09-19 RX ADMIN — SODIUM CHLORIDE 3 MILLILITER(S): 9 INJECTION INTRAMUSCULAR; INTRAVENOUS; SUBCUTANEOUS at 21:54

## 2019-09-19 RX ADMIN — GLYCOPYRROLATE AND FORMOTEROL FUMARATE 2 PUFF(S): 9; 4.8 AEROSOL, METERED RESPIRATORY (INHALATION) at 18:50

## 2019-09-19 RX ADMIN — Medication 100 MILLIGRAM(S): at 05:17

## 2019-09-19 RX ADMIN — SEVELAMER CARBONATE 1600 MILLIGRAM(S): 2400 POWDER, FOR SUSPENSION ORAL at 08:10

## 2019-09-19 RX ADMIN — KETOTIFEN FUMARATE 1 DROP(S): 0.34 SOLUTION OPHTHALMIC at 18:28

## 2019-09-19 RX ADMIN — TIOTROPIUM BROMIDE 1 CAPSULE(S): 18 CAPSULE ORAL; RESPIRATORY (INHALATION) at 18:28

## 2019-09-19 RX ADMIN — Medication 25 MILLIGRAM(S): at 22:55

## 2019-09-19 RX ADMIN — SEVELAMER CARBONATE 1600 MILLIGRAM(S): 2400 POWDER, FOR SUSPENSION ORAL at 18:28

## 2019-09-19 RX ADMIN — HEPARIN SODIUM 10 UNIT(S)/HR: 5000 INJECTION INTRAVENOUS; SUBCUTANEOUS at 12:51

## 2019-09-19 RX ADMIN — ATORVASTATIN CALCIUM 80 MILLIGRAM(S): 80 TABLET, FILM COATED ORAL at 22:55

## 2019-09-19 RX ADMIN — Medication 81 MILLIGRAM(S): at 12:36

## 2019-09-19 RX ADMIN — Medication 25 MILLIGRAM(S): at 05:17

## 2019-09-19 RX ADMIN — AMLODIPINE BESYLATE 10 MILLIGRAM(S): 2.5 TABLET ORAL at 05:17

## 2019-09-19 NOTE — PROGRESS NOTE ADULT - SUBJECTIVE AND OBJECTIVE BOX
S: Comfortable. Denies chest pain or SOB. Review of systems otherwise (-)      MEDICATIONS  (STANDING):  amLODIPine   Tablet 10 milliGRAM(s) Oral daily  aspirin  chewable 81 milliGRAM(s) Oral daily  atorvastatin 80 milliGRAM(s) Oral at bedtime  chlorhexidine 0.12% Liquid 15 milliLiter(s) Swish and Spit once  chlorhexidine 2% Cloths 1 Application(s) Topical <User Schedule>  docusate sodium 100 milliGRAM(s) Oral three times a day  heparin  Infusion 1000 Unit(s)/Hr (10 mL/Hr) IV Continuous <Continuous>  insulin lispro (HumaLOG) corrective regimen sliding scale   SubCutaneous three times a day before meals  insulin lispro (HumaLOG) corrective regimen sliding scale   SubCutaneous at bedtime  metoprolol tartrate 25 milliGRAM(s) Oral every 8 hours  pantoprazole    Tablet 40 milliGRAM(s) Oral before breakfast  sevelamer carbonate 1600 milliGRAM(s) Oral three times a day with meals  sodium chloride 0.9% lock flush 3 milliLiter(s) IV Push every 8 hours    MEDICATIONS  (PRN):      LABS:                            11.6   9.2   )-----------( 163      ( 19 Sep 2019 03:59 )             34.8     Hemoglobin: 11.6 g/dL ( @ 03:59)  Hemoglobin: 12.6 g/dL ( @ 05:08)  Hemoglobin: 11.5 g/dL ( @ 01:30)  Hemoglobin: 13.3 g/dL ( @ 17:12)  Hemoglobin: 12.3 g/dL ( @ 10:05)        134<L>  |  94<L>  |  59<H>  ----------------------------<  141<H>  4.2   |  24  |  8.20<H>    Ca    8.9      19 Sep 2019 03:59  Phos  5.6       Mg     1.9         TPro  6.7  /  Alb  3.5  /  TBili  0.3  /  DBili  x   /  AST  6<L>  /  ALT  5<L>  /  AlkPhos  75      Creatinine Trend: 8.20<--, 6.59<--, 8.66<--, 8.24<--, 7.80<--   PTT - ( 19 Sep 2019 10:49 )  PTT:54.2 sec  CARDIAC MARKERS ( 17 Sep 2019 12:11 )  x     / x     / 68 U/L / x     / x      CARDIAC MARKERS ( 17 Sep 2019 05:26 )  x     / x     / 84 U/L / x     / 4.6 ng/mL  CARDIAC MARKERS ( 16 Sep 2019 17:12 )  x     / x     / 127 U/L / x     / 5.8 ng/mL          19 @ 07:01  -  19 @ 07:00  --------------------------------------------------------  IN: 422 mL / OUT: 0 mL / NET: 422 mL        PHYSICAL EXAM  Vital Signs Last 24 Hrs  T(C): 36.9 (19 Sep 2019 05:00), Max: 36.9 (18 Sep 2019 13:58)  T(F): 98.4 (19 Sep 2019 05:00), Max: 98.4 (18 Sep 2019 13:58)  HR: 66 (19 Sep 2019 05:00) (59 - 66)  BP: 137/80 (19 Sep 2019 05:00) (137/80 - 178/81)  BP(mean): --  RR: 18 (19 Sep 2019 05:00) (18 - 18)  SpO2: 94% (19 Sep 2019 05:00) (92% - 94%)      Gen: Appears well in NAD  HEENT:  (-)icterus (-)pallor  CV: N S1 S2 1/6 DEJAH (+)2 Pulses B/l  Resp:  Clear to auscultation B/L, normal effort  GI: (+) BS Soft, NT, ND  Lymph:  (-)Edema, (-)obvious lymphadenopathy  Skin: Warm to touch, Normal turgor  Psych: Appropriate mood and affect    TELEMETRY: NSR 60-70, PVC	      < from: Cardiac Cath Lab - Adult (19 @ 12:28) >  PROCEDURE:  --  Left heart catheterization.  --  Left coronary angiography.  --  Right coronary angiography.  --  Sonosite - Diagnostic.  VENTRICLES: No LV gram was performed; however, a recent echocardiogram  demonstrated normal global and regional LV function.  CORONARY VESSELS: The coronary circulation is right dominant.  LM:   --  Ostial LM: There was a discrete 90 % stenosis.  LAD:   --  Mid LAD: Angiography showed minor luminal irregularities with no  flow limiting lesions. The stent in the mid LAD is patent.  --  Distal LAD: The vessel was small sized. Angiography showed moderate  atherosclerosis.  CX:   --  Mid circumflex: There was a tubular 50 % stenosis at the site of  a prior stent.  RI:   --  Proximal ramus intermedius: There was a diffuse 50 % stenosis at  the site of a prior stent.  RCA:   --  Mid RCA: There was a tubular 50 % stenosis at the site of a  prior stent.  --  RPLS: Angiography showed minor luminal irregularities with no flow  limiting lesions.  COMPLICATIONS: There were no complications.  DIAGNOSTIC RECOMMENDATIONS: Consultation with a cardiac surgeon be obtained  for surgical opinion and coronary artery bypass grafting.  Prepared and signed by  Jessy Méndez M.D.  Signed 2019 13:42:23  HEMODYNAMIC TABLES  Pressures:  NO PHASE  Pressures:  - HR: 69  Pressures:  - Rhythm:  Pressures:  -- Aortic Pressure (S/D/M): 170/70/113  Pressures:  -- Left Ventricle (s/edp): 164/28/--  Outputs:  NO PHASE  Outputs:  -- CALCULATIONS[age]  Outputs:  -- CALCULATIONS: Body Surface Area: 1.92  Outputs:  -- CALCULATIONS: Height in cm: 168.00  Outputs:  -- CALCULATIONS: Sex: Male  Outputs:  -- CALCULATIONS: Weight in k.60  Outputs:  -- OUTPUTS: O2 consumption: 239.38  Outputs:  -- OUTPUTS: Vo2 Indexed: 125.00  < end of copied text >    ASSESSMENT/PLAN: 70 y/o male with PMHx of CAD, HTN, HLD, T2DM, and ESRD on HD presented after abnormal stress test to [facility] for elective cardiac catheterization. Patient admitted post cath after cath revealed severe ostial LM disease. Now transferred to [facility] for CABG.  	  -Cath revealed severe ostial LM disease  -Tele remains stable  -Continue ASA/Statin/BB  -HD per renal  -Continue hep gtt  -F/u CTS plan for CABG    Wilfred Robin PA-C  [facility]  Pager: [phone]

## 2019-09-19 NOTE — PROGRESS NOTE ADULT - ASSESSMENT
69M ESRD on HD(Tu/Th/Sa), HTN, CAD s/p PCI with 15 stents placed, s/p cardiac cath today which showed LM disease with 90%.  Pre op for CABG 9/23.  Heparin gtt  9/19  HD stable.  Dialysis today.  Transferred to [facility].  Cont hep gtt.  OR Monday

## 2019-09-19 NOTE — PROGRESS NOTE ADULT - SUBJECTIVE AND OBJECTIVE BOX
Patient is a 69y old  Male who presents with a chief complaint of chest pain for CABG (18 Sep 2019 12:15)      SUBJECTIVE / OVERNIGHT EVENTS: overnight events noted    ROS:  Resp: No cough no sputum production  CVS: No chest pain no palpitations no orthopnea  GI: no N/V/D  : no dysuria, no hematuria  Neuro: no weakness no paresthesias  Heme: No petechiae no easy bruising  Msk: No joint pain no swelling  Skin: No rash no itching        MEDICATIONS  (STANDING):  amLODIPine   Tablet 10 milliGRAM(s) Oral daily  aspirin  chewable 81 milliGRAM(s) Oral daily  atorvastatin 80 milliGRAM(s) Oral at bedtime  chlorhexidine 0.12% Liquid 15 milliLiter(s) Swish and Spit once  chlorhexidine 2% Cloths 1 Application(s) Topical <User Schedule>  docusate sodium 100 milliGRAM(s) Oral three times a day  glycopyrrolate 9 MICROgram(s)/formoterol 4.8 MICROgram(s)Inhaler 2 Puff(s) Inhalation two times a day  heparin  Infusion. 1100 Unit(s)/Hr (11 mL/Hr) IV Continuous <Continuous>  insulin lispro (HumaLOG) corrective regimen sliding scale   SubCutaneous three times a day before meals  insulin lispro (HumaLOG) corrective regimen sliding scale   SubCutaneous at bedtime  ketotifen 0.025% Ophthalmic Solution 1 Drop(s) Both EYES two times a day  methimazole 5 milliGRAM(s) Oral daily  metoprolol tartrate 25 milliGRAM(s) Oral every 8 hours  pantoprazole    Tablet 40 milliGRAM(s) Oral before breakfast  sevelamer carbonate 1600 milliGRAM(s) Oral three times a day with meals  sodium chloride 0.9% lock flush 3 milliLiter(s) IV Push every 8 hours  tiotropium 18 MICROgram(s) Capsule 1 Capsule(s) Inhalation daily    MEDICATIONS  (PRN):  ALBUTerol    90 MICROgram(s) HFA Inhaler 2 Puff(s) Inhalation every 4 hours PRN Wheezing        CAPILLARY BLOOD GLUCOSE      POCT Blood Glucose.: 198 mg/dL (19 Sep 2019 17:01)  POCT Blood Glucose.: 142 mg/dL (19 Sep 2019 12:29)  POCT Blood Glucose.: 159 mg/dL (19 Sep 2019 08:00)  POCT Blood Glucose.: 157 mg/dL (18 Sep 2019 21:34)    I&O's Summary    18 Sep 2019 07:01  -  19 Sep 2019 07:00  --------------------------------------------------------  IN: 422 mL / OUT: 0 mL / NET: 422 mL        Vital Signs Last 24 Hrs  T(C): 36.7 (19 Sep 2019 13:05), Max: 36.9 (19 Sep 2019 05:00)  T(F): 98.1 (19 Sep 2019 13:05), Max: 98.4 (19 Sep 2019 05:00)  HR: 45 (19 Sep 2019 14:35) (45 - 66)  BP: 164/75 (19 Sep 2019 14:35) (137/80 - 178/81)  BP(mean): --  RR: 17 (19 Sep 2019 13:05) (17 - 18)  SpO2: 92% (19 Sep 2019 13:05) (92% - 94%)    PHYSICAL EXAM:  GENERAL: NAD, well-developed  HEAD:  Atraumatic, Normocephalic  EYES: EOMI, PERRLA, conjunctiva and sclera clear  NECK: Supple, No JVD  CHEST/LUNG: Clear to auscultation bilaterally; No wheeze  HEART: S1S2; soft ejection systolic murmur best heard at left sternal border no rub no gallop   ABDOMEN: Soft, Nontender, Nondistended; Bowel sounds present  EXTREMITIES:  + Peripheral Pulses, No clubbing or cyanosis, no edema  PSYCH: AO x 3,   NEUROLOGY: Alert, no focal motor or sensory deficits  SKIN: No rashes or lesions    LABS:                        11.6   9.2   )-----------( 163      ( 19 Sep 2019 03:59 )             34.8     09-19    134<L>  |  94<L>  |  59<H>  ----------------------------<  141<H>  4.2   |  24  |  8.20<H>    Ca    8.9      19 Sep 2019 03:59  Phos  5.6     09-18  Mg     1.9     09-18    TPro  6.7  /  Alb  3.5  /  TBili  0.3  /  DBili  x   /  AST  6<L>  /  ALT  5<L>  /  AlkPhos  75  09-19    PT/INR - ( 18 Sep 2019 05:08 )   PT: 13.2 sec;   INR: 1.15 ratio         PTT - ( 19 Sep 2019 10:49 )  PTT:54.2 sec            All consultant(s) notes reviewed and care discussed with other providers        Contact Number, Dr Cagle [phone]

## 2019-09-19 NOTE — PROGRESS NOTE ADULT - SUBJECTIVE AND OBJECTIVE BOX
[facility] - NII Knight / NII Mendiola / JASWANT Arroyo/ NII Mackey/ NII Macias/ BRIGITTE Esposito / ARAMIS Calle / LUDA Bowen  ---------------------------------------------------------------------------------------------------------------  seen and examined today for ESRD on HD  Interval : due for HD today  VITALS:  T(F): 98.1 (09-19-19 @ 13:05), Max: 98.4 (09-18-19 @ 13:58)  HR: 64 (09-19-19 @ 13:05)  BP: 152/68 (09-19-19 @ 13:05)  RR: 17 (09-19-19 @ 13:05)  SpO2: 92% (09-19-19 @ 13:05)  Wt(kg): --    09-18 @ 07:01  -  09-19 @ 07:00  --------------------------------------------------------  IN: 422 mL / OUT: 0 mL / NET: 422 mL      Physical Exam :-  Constitutional: NAD  Neck: Supple.  Respiratory: Bilateral equal breath sounds,  Cardiovascular: S1, S2 normal,  Gastrointestinal: Bowel Sounds present, soft, non tender.  Extremities: No edema  Neurological: Alert and Oriented x 3, no focal deficits  Psychiatric: Normal mood, normal affect  Data:-  Allergies :   lisinopril (Other)    Hospital Medications:   MEDICATIONS  (STANDING):  amLODIPine   Tablet 10 milliGRAM(s) Oral daily  aspirin  chewable 81 milliGRAM(s) Oral daily  atorvastatin 80 milliGRAM(s) Oral at bedtime  chlorhexidine 0.12% Liquid 15 milliLiter(s) Swish and Spit once  chlorhexidine 2% Cloths 1 Application(s) Topical <User Schedule>  docusate sodium 100 milliGRAM(s) Oral three times a day  heparin  Infusion 1000 Unit(s)/Hr (10 mL/Hr) IV Continuous <Continuous>  insulin lispro (HumaLOG) corrective regimen sliding scale   SubCutaneous three times a day before meals  insulin lispro (HumaLOG) corrective regimen sliding scale   SubCutaneous at bedtime  metoprolol tartrate 25 milliGRAM(s) Oral every 8 hours  pantoprazole    Tablet 40 milliGRAM(s) Oral before breakfast  sevelamer carbonate 1600 milliGRAM(s) Oral three times a day with meals  sodium chloride 0.9% lock flush 3 milliLiter(s) IV Push every 8 hours    09-19    134<L>  |  94<L>  |  59<H>  ----------------------------<  141<H>  4.2   |  24  |  8.20<H>    Ca    8.9      19 Sep 2019 03:59  Phos  5.6     09-18  Mg     1.9     09-18    TPro  6.7  /  Alb  3.5  /  TBili  0.3  /  DBili      /  AST  6<L>  /  ALT  5<L>  /  AlkPhos  75  09-19    Creatinine Trend: 8.20 <--, 6.59 <--, 8.66 <--, 8.24 <--, 7.80 <--                        11.6   9.2   )-----------( 163      ( 19 Sep 2019 03:59 )             34.8

## 2019-09-19 NOTE — PROGRESS NOTE ADULT - PROBLEM SELECTOR PLAN 1
due for HD today and Saturday  Planned for CABG on Monday  Daily BMP  Renal diet  dose meds for ESRD

## 2019-09-19 NOTE — PROGRESS NOTE ADULT - ASSESSMENT
69 y.o. male with pmh of ESRD( HD on tu/Th/sa) at [facility] via [facility]. Last dialyzed yesterday 9/14, was sent to [facility] for cath after presenting with SOB and a positive stress test. Transferred to [facility] for evaluation for CABG.

## 2019-09-19 NOTE — PHARMACOTHERAPY INTERVENTION NOTE - COMMENTS
Confirmed home medications with patient and pharmacy, updated in Outpatient Medication Review. Home medications patient is not currently receiving inpatient: methimazole 5 mg tablet once daily, Bevespi Aerosphere 2 sprays twice daily, Spiriva Respimat, ProAir RespiClick, Flonase Spray, and azelastine 0.05% eye drops.    Vannessa Nuñez, PharmD  PGY-1 Pharmacy Resident  [phone]

## 2019-09-19 NOTE — PROGRESS NOTE ADULT - ASSESSMENT
69M ESRD on HD(Tu/Th/Sa), HTN, CAD s/p PCI with 15 stents placed, s/p cardiac cath today which showed LM disease with 90%, now transferred to [facility] for CT Surgery evaluation.

## 2019-09-19 NOTE — PROGRESS NOTE ADULT - SUBJECTIVE AND OBJECTIVE BOX
S:  feels ok.  No chest pain.  Sob when ambulating    Telemetry:  SR 60-70    Vital Signs Last 24 Hrs  T(C): 36.7 (19 @ 13:05), Max: 36.9 (19 @ 05:00)  T(F): 98.1 (19 @ 13:05), Max: 98.4 (19 @ 05:00)  HR: 45 (19 @ 14:35) (45 - 66)  BP: 164/75 (19 @ 14:35) (137/80 - 178/81)  RR: 17 (19 @ 13:05) (17 - 18)  SpO2: 92% (19 @ 13:05) (92% - 94%)                    @ 07:01  -   @ 07:00  --------------------------------------------------------  IN: 422 mL / OUT: 0 mL / NET: 422 mL      Daily Weight in k.3 (19 Sep 2019 08:03)      POCT Blood Glucose.: 142 mg/dL (19 Sep 2019 12:29)  POCT Blood Glucose.: 159 mg/dL (19 Sep 2019 08:00)  POCT Blood Glucose.: 157 mg/dL (18 Sep 2019 21:34)  POCT Blood Glucose.: 120 mg/dL (18 Sep 2019 16:37)          Drains:     MS         [  ] Drainage:                 L Pleural  [  ]  Drainage:                R Pleural  [  ]  Drainage:    Pacing Wires        [  ]   Settings:                                  Isolated  [  ]    Coumadin    [ ] YES          [x]      NO         Reason:  heparin gtt                            11.6   9.2   )-----------( 163      ( 19 Sep 2019 03:59 )             34.8       19    134<L>  |  94<L>  |  59<H>  ----------------------------<  141<H>  4.2   |  24  |  8.20<H>    Ca    8.9      19 Sep 2019 03:59  Phos  5.6       Mg     1.9         TPro  6.7  /  Alb  3.5  /  TBili  0.3  /  DBili  x   /  AST  6<L>  /  ALT  5<L>  /  AlkPhos  75        PT/INR - ( 18 Sep 2019 05:08 )   PT: 13.2 sec;   INR: 1.15 ratio         PTT - ( 19 Sep 2019 10:49 )  PTT:54.2 sec    PHYSICAL EXAM:    Neurology: alert and oriented x 3, nonfocal, no gross deficits    CV :  S1S2 heard RRR    Lungs: clear to ausc.  No w/r/r    Abdomen: soft, nontender, nondistended, positive bowel sounds, last bowel movement           Extremities:  no edema          amLODIPine   Tablet 10 milliGRAM(s) Oral daily  aspirin  chewable 81 milliGRAM(s) Oral daily  atorvastatin 80 milliGRAM(s) Oral at bedtime  chlorhexidine 0.12% Liquid 15 milliLiter(s) Swish and Spit once  chlorhexidine 2% Cloths 1 Application(s) Topical <User Schedule>  docusate sodium 100 milliGRAM(s) Oral three times a day  heparin  Infusion. 1100 Unit(s)/Hr IV Continuous <Continuous>  insulin lispro (HumaLOG) corrective regimen sliding scale   SubCutaneous three times a day before meals  insulin lispro (HumaLOG) corrective regimen sliding scale   SubCutaneous at bedtime  metoprolol tartrate 25 milliGRAM(s) Oral every 8 hours  pantoprazole    Tablet 40 milliGRAM(s) Oral before breakfast  sevelamer carbonate 1600 milliGRAM(s) Oral three times a day with meals  sodium chloride 0.9% lock flush 3 milliLiter(s) IV Push every 8 hours                              Physical Therapy Rec:   Home  [  ]   Home w/ PT  [  ]  Rehab  [  ]    Discussed with Cardiothoracic Team at AM rounds.

## 2019-09-20 LAB
ANION GAP SERPL CALC-SCNC: 15 MMOL/L — SIGNIFICANT CHANGE UP (ref 5–17)
APTT BLD: 35.6 SEC — SIGNIFICANT CHANGE UP (ref 27.5–36.3)
APTT BLD: 43.8 SEC — HIGH (ref 27.5–36.3)
APTT BLD: 52.4 SEC — HIGH (ref 27.5–36.3)
APTT BLD: 76.3 SEC — HIGH (ref 27.5–36.3)
APTT BLD: >200 SEC — CRITICAL HIGH (ref 27.5–36.3)
BUN SERPL-MCNC: 34 MG/DL — HIGH (ref 7–23)
CALCIUM SERPL-MCNC: 9.5 MG/DL — SIGNIFICANT CHANGE UP (ref 8.4–10.5)
CHLORIDE SERPL-SCNC: 93 MMOL/L — LOW (ref 96–108)
CO2 SERPL-SCNC: 26 MMOL/L — SIGNIFICANT CHANGE UP (ref 22–31)
CREAT SERPL-MCNC: 5.55 MG/DL — HIGH (ref 0.5–1.3)
FIBRINOGEN PPP-MCNC: 668 MG/DL — HIGH (ref 320–500)
GLUCOSE BLDC GLUCOMTR-MCNC: 150 MG/DL — HIGH (ref 70–99)
GLUCOSE BLDC GLUCOMTR-MCNC: 152 MG/DL — HIGH (ref 70–99)
GLUCOSE BLDC GLUCOMTR-MCNC: 154 MG/DL — HIGH (ref 70–99)
GLUCOSE BLDC GLUCOMTR-MCNC: 227 MG/DL — HIGH (ref 70–99)
GLUCOSE SERPL-MCNC: 187 MG/DL — HIGH (ref 70–99)
HCT VFR BLD CALC: 35.2 % — LOW (ref 39–50)
HGB BLD-MCNC: 12.8 G/DL — LOW (ref 13–17)
INR BLD: 1.13 RATIO — SIGNIFICANT CHANGE UP (ref 0.88–1.16)
INR BLD: 1.13 RATIO — SIGNIFICANT CHANGE UP (ref 0.88–1.16)
MCHC RBC-ENTMCNC: 32.1 PG — SIGNIFICANT CHANGE UP (ref 27–34)
MCHC RBC-ENTMCNC: 36.4 GM/DL — HIGH (ref 32–36)
MCV RBC AUTO: 88.3 FL — SIGNIFICANT CHANGE UP (ref 80–100)
PLATELET # BLD AUTO: 145 K/UL — LOW (ref 150–400)
POTASSIUM SERPL-MCNC: 3.9 MMOL/L — SIGNIFICANT CHANGE UP (ref 3.5–5.3)
POTASSIUM SERPL-SCNC: 3.9 MMOL/L — SIGNIFICANT CHANGE UP (ref 3.5–5.3)
PROTHROM AB SERPL-ACNC: 12.9 SEC — SIGNIFICANT CHANGE UP (ref 10–12.9)
PROTHROM AB SERPL-ACNC: 13 SEC — HIGH (ref 10–12.9)
RBC # BLD: 3.99 M/UL — LOW (ref 4.2–5.8)
RBC # FLD: 11.6 % — SIGNIFICANT CHANGE UP (ref 10.3–14.5)
SODIUM SERPL-SCNC: 134 MMOL/L — LOW (ref 135–145)
WBC # BLD: 8 K/UL — SIGNIFICANT CHANGE UP (ref 3.8–10.5)
WBC # FLD AUTO: 8 K/UL — SIGNIFICANT CHANGE UP (ref 3.8–10.5)

## 2019-09-20 PROCEDURE — 99232 SBSQ HOSP IP/OBS MODERATE 35: CPT

## 2019-09-20 RX ORDER — INSULIN GLARGINE 100 [IU]/ML
5 INJECTION, SOLUTION SUBCUTANEOUS AT BEDTIME
Refills: 0 | Status: DISCONTINUED | OUTPATIENT
Start: 2019-09-20 | End: 2019-09-23

## 2019-09-20 RX ADMIN — SEVELAMER CARBONATE 1600 MILLIGRAM(S): 2400 POWDER, FOR SUSPENSION ORAL at 07:47

## 2019-09-20 RX ADMIN — ATORVASTATIN CALCIUM 80 MILLIGRAM(S): 80 TABLET, FILM COATED ORAL at 22:24

## 2019-09-20 RX ADMIN — KETOTIFEN FUMARATE 1 DROP(S): 0.34 SOLUTION OPHTHALMIC at 17:23

## 2019-09-20 RX ADMIN — Medication 2: at 17:23

## 2019-09-20 RX ADMIN — SODIUM CHLORIDE 3 MILLILITER(S): 9 INJECTION INTRAMUSCULAR; INTRAVENOUS; SUBCUTANEOUS at 22:19

## 2019-09-20 RX ADMIN — Medication 25 MILLIGRAM(S): at 22:25

## 2019-09-20 RX ADMIN — GLYCOPYRROLATE AND FORMOTEROL FUMARATE 2 PUFF(S): 9; 4.8 AEROSOL, METERED RESPIRATORY (INHALATION) at 05:06

## 2019-09-20 RX ADMIN — Medication 100 MILLIGRAM(S): at 13:04

## 2019-09-20 RX ADMIN — PANTOPRAZOLE SODIUM 40 MILLIGRAM(S): 20 TABLET, DELAYED RELEASE ORAL at 05:06

## 2019-09-20 RX ADMIN — CHLORHEXIDINE GLUCONATE 1 APPLICATION(S): 213 SOLUTION TOPICAL at 10:55

## 2019-09-20 RX ADMIN — Medication 100 MILLIGRAM(S): at 05:06

## 2019-09-20 RX ADMIN — GLYCOPYRROLATE AND FORMOTEROL FUMARATE 2 PUFF(S): 9; 4.8 AEROSOL, METERED RESPIRATORY (INHALATION) at 17:23

## 2019-09-20 RX ADMIN — TIOTROPIUM BROMIDE 1 CAPSULE(S): 18 CAPSULE ORAL; RESPIRATORY (INHALATION) at 11:34

## 2019-09-20 RX ADMIN — SODIUM CHLORIDE 3 MILLILITER(S): 9 INJECTION INTRAMUSCULAR; INTRAVENOUS; SUBCUTANEOUS at 05:01

## 2019-09-20 RX ADMIN — SODIUM CHLORIDE 3 MILLILITER(S): 9 INJECTION INTRAMUSCULAR; INTRAVENOUS; SUBCUTANEOUS at 13:02

## 2019-09-20 RX ADMIN — HEPARIN SODIUM 1400 UNIT(S)/HR: 5000 INJECTION INTRAVENOUS; SUBCUTANEOUS at 22:28

## 2019-09-20 RX ADMIN — Medication 25 MILLIGRAM(S): at 05:06

## 2019-09-20 RX ADMIN — HEPARIN SODIUM 1150 UNIT(S)/HR: 5000 INJECTION INTRAVENOUS; SUBCUTANEOUS at 11:56

## 2019-09-20 RX ADMIN — SEVELAMER CARBONATE 1600 MILLIGRAM(S): 2400 POWDER, FOR SUSPENSION ORAL at 17:23

## 2019-09-20 RX ADMIN — Medication 81 MILLIGRAM(S): at 11:34

## 2019-09-20 RX ADMIN — SEVELAMER CARBONATE 1600 MILLIGRAM(S): 2400 POWDER, FOR SUSPENSION ORAL at 11:34

## 2019-09-20 RX ADMIN — AMLODIPINE BESYLATE 10 MILLIGRAM(S): 2.5 TABLET ORAL at 05:06

## 2019-09-20 RX ADMIN — HEPARIN SODIUM 1250 UNIT(S)/HR: 5000 INJECTION INTRAVENOUS; SUBCUTANEOUS at 04:45

## 2019-09-20 RX ADMIN — KETOTIFEN FUMARATE 1 DROP(S): 0.34 SOLUTION OPHTHALMIC at 05:06

## 2019-09-20 RX ADMIN — Medication 4: at 11:34

## 2019-09-20 RX ADMIN — INSULIN GLARGINE 5 UNIT(S): 100 INJECTION, SOLUTION SUBCUTANEOUS at 22:24

## 2019-09-20 RX ADMIN — Medication 25 MILLIGRAM(S): at 13:04

## 2019-09-20 NOTE — PROGRESS NOTE ADULT - ASSESSMENT
69 y.o. male with pmh of ESRD( HD on tu/Th/sa) at [facility] via [facility]. Last dialyzed yesterday 9/14, was sent to [facility] for cath after presenting with SOB and a positive stress test. Transferred to [facility] for evaluation for CABG.

## 2019-09-20 NOTE — PROGRESS NOTE ADULT - SUBJECTIVE AND OBJECTIVE BOX
Subjective:  Im ok    Denies pain, family visiting    Tele:         SR  70                        T(C): 36.9 (09-20-19 @ 13:00), Max: 36.9 (09-20-19 @ 13:00)  HR: 63 (09-20-19 @ 13:00) (63 - 74)  BP: 157/71 (09-20-19 @ 13:00) (157/71 - 179/86)  RR: 18 (09-20-19 @ 13:00) (16 - 18)  SpO2: 98% (09-20-19 @ 13:00) (92% - 98%)    LVEF:       09-20    134<L>  |  93<L>  |  34<H>  ----------------------------<  187<H>  3.9   |  26  |  5.55<H>    Ca    9.5      20 Sep 2019 05:39    TPro  6.7  /  Alb  3.5  /  TBili  0.3  /  DBili  x   /  AST  6<L>  /  ALT  5<L>  /  AlkPhos  75  09-19                               12.8   8.0   )-----------( 145      ( 20 Sep 2019 05:39 )             35.2        PT/INR - ( 20 Sep 2019 05:39 )   PT: 12.9 sec;   INR: 1.13 ratio         PTT - ( 20 Sep 2019 11:23 )  PTT:76.3 sec    CAPILLARY BLOOD GLUCOSE      POCT Blood Glucose.: 227 mg/dL (20 Sep 2019 11:31)  POCT Blood Glucose.: 150 mg/dL (20 Sep 2019 07:42)  POCT Blood Glucose.: 96 mg/dL (19 Sep 2019 21:44)  POCT Blood Glucose.: 198 mg/dL (19 Sep 2019 17:01)             Assessment    Neuro: alert, no deficits    Pulm: diminished though clear at bases    CV: S1 S2  RRR    Abd: soft  non tender Reports BM this am    Extremities: no edema      MEDICATIONS  (STANDING):  amLODIPine   Tablet 10 milliGRAM(s) Oral daily  aspirin  chewable 81 milliGRAM(s) Oral daily  atorvastatin 80 milliGRAM(s) Oral at bedtime  chlorhexidine 0.12% Liquid 15 milliLiter(s) Swish and Spit once  chlorhexidine 2% Cloths 1 Application(s) Topical <User Schedule>  docusate sodium 100 milliGRAM(s) Oral three times a day  glycopyrrolate 9 MICROgram(s)/formoterol 4.8 MICROgram(s)Inhaler 2 Puff(s) Inhalation two times a day  heparin  Infusion. 1100 Unit(s)/Hr (11 mL/Hr) IV Continuous <Continuous>  insulin glargine Injectable (LANTUS) 5 Unit(s) SubCutaneous at bedtime  insulin lispro (HumaLOG) corrective regimen sliding scale   SubCutaneous three times a day before meals  insulin lispro (HumaLOG) corrective regimen sliding scale   SubCutaneous at bedtime  ketotifen 0.025% Ophthalmic Solution 1 Drop(s) Both EYES two times a day  methimazole 5 milliGRAM(s) Oral daily  metoprolol tartrate 25 milliGRAM(s) Oral every 8 hours  pantoprazole    Tablet 40 milliGRAM(s) Oral before breakfast  sevelamer carbonate 1600 milliGRAM(s) Oral three times a day with meals  sodium chloride 0.9% lock flush 3 milliLiter(s) IV Push every 8 hours  tiotropium 18 MICROgram(s) Capsule 1 Capsule(s) Inhalation daily       PAST MEDICAL & SURGICAL HISTORY:  ESRD (end stage renal disease) on dialysis: T-Th-Sat  Stented coronary artery: total 15 stents from 4857-5640  Hyperthyroidism  H/O: CVA: after cardiac stent placed 12/15/09 -no residual  Heart Attack: 2/1/07 with subsequent stent  Coronary Stent: 4618-0220 10 stents,  to Ramus 1/2015, cath 01/2016 ( see results in HPI)  Hypercholesterolemia  CAD (Coronary Artery Disease)  DM (Diabetes Mellitus): x 4 yrs without N/N/R  HTN (Hypertension)  Hemodialysis access, AV graft: 5/2015, L arm  S/P cataract extraction  Boil of Buttock: 2006 and 2008

## 2019-09-20 NOTE — PROGRESS NOTE ADULT - PROBLEM SELECTOR PLAN 1
continue finger sticks with short acting insulin sliding scale  acceptable at present  tiredness unclear etiology  TSH normal

## 2019-09-20 NOTE — PROGRESS NOTE ADULT - PROBLEM SELECTOR PLAN 1
continue finger sticks with short acting insulin sliding scale  Add lantus elevated glucose> Dietary adherence reinforced

## 2019-09-20 NOTE — PROGRESS NOTE ADULT - PROBLEM SELECTOR PLAN 1
tolerated HD yesterday, due for Saturday  Planned for CABG on Monday  Daily BMP  Renal diet  dose meds for ESRD

## 2019-09-20 NOTE — PROGRESS NOTE ADULT - SUBJECTIVE AND OBJECTIVE BOX
Patient is a 69y old  Male who presents with a chief complaint of chest pain (19 Sep 2019 17:16)      SUBJECTIVE / OVERNIGHT EVENTS: overnight events noted  feels tired    ROS:  Resp: No cough no sputum production  CVS: No chest pain no palpitations no orthopnea  GI: no N/V/D  : no dysuria, no hematuria  Neuro: no weakness no paresthesias  Heme: No petechiae no easy bruising  Msk: No joint pain no swelling  Skin: No rash no itching        MEDICATIONS  (STANDING):  amLODIPine   Tablet 10 milliGRAM(s) Oral daily  aspirin  chewable 81 milliGRAM(s) Oral daily  atorvastatin 80 milliGRAM(s) Oral at bedtime  chlorhexidine 0.12% Liquid 15 milliLiter(s) Swish and Spit once  chlorhexidine 2% Cloths 1 Application(s) Topical <User Schedule>  docusate sodium 100 milliGRAM(s) Oral three times a day  glycopyrrolate 9 MICROgram(s)/formoterol 4.8 MICROgram(s)Inhaler 2 Puff(s) Inhalation two times a day  heparin  Infusion. 1100 Unit(s)/Hr (11 mL/Hr) IV Continuous <Continuous>  insulin lispro (HumaLOG) corrective regimen sliding scale   SubCutaneous three times a day before meals  insulin lispro (HumaLOG) corrective regimen sliding scale   SubCutaneous at bedtime  ketotifen 0.025% Ophthalmic Solution 1 Drop(s) Both EYES two times a day  methimazole 5 milliGRAM(s) Oral daily  metoprolol tartrate 25 milliGRAM(s) Oral every 8 hours  pantoprazole    Tablet 40 milliGRAM(s) Oral before breakfast  sevelamer carbonate 1600 milliGRAM(s) Oral three times a day with meals  sodium chloride 0.9% lock flush 3 milliLiter(s) IV Push every 8 hours  tiotropium 18 MICROgram(s) Capsule 1 Capsule(s) Inhalation daily    MEDICATIONS  (PRN):  ALBUTerol    90 MICROgram(s) HFA Inhaler 2 Puff(s) Inhalation every 4 hours PRN Wheezing        CAPILLARY BLOOD GLUCOSE      POCT Blood Glucose.: 227 mg/dL (20 Sep 2019 11:31)  POCT Blood Glucose.: 150 mg/dL (20 Sep 2019 07:42)  POCT Blood Glucose.: 96 mg/dL (19 Sep 2019 21:44)  POCT Blood Glucose.: 198 mg/dL (19 Sep 2019 17:01)    I&O's Summary    19 Sep 2019 07:01  -  20 Sep 2019 07:00  --------------------------------------------------------  IN: 0 mL / OUT: 2500 mL / NET: -2500 mL    20 Sep 2019 07:01  -  20 Sep 2019 15:40  --------------------------------------------------------  IN: 660 mL / OUT: 0 mL / NET: 660 mL        Vital Signs Last 24 Hrs  T(C): 36.9 (20 Sep 2019 13:00), Max: 36.9 (20 Sep 2019 13:00)  T(F): 98.5 (20 Sep 2019 13:00), Max: 98.5 (20 Sep 2019 13:00)  HR: 63 (20 Sep 2019 13:00) (63 - 74)  BP: 157/71 (20 Sep 2019 13:00) (157/71 - 179/86)  BP(mean): --  RR: 18 (20 Sep 2019 13:00) (16 - 18)  SpO2: 98% (20 Sep 2019 13:00) (92% - 98%)    PHYSICAL EXAM:  GENERAL: NAD, well-developed  HEAD:  Atraumatic, Normocephalic  EYES: EOMI, PERRLA, conjunctiva and sclera clear  NECK: Supple, No JVD  CHEST/LUNG: Clear to auscultation bilaterally; No wheeze  HEART: S1S2; soft ejection systolic murmur best heard at left sternal border no rub no gallop   ABDOMEN: Soft, Nontender, Nondistended; Bowel sounds present  EXTREMITIES:  + Peripheral Pulses, No clubbing or cyanosis, no edema  PSYCH: AO x 3,   NEUROLOGY: Alert, no focal motor or sensory deficits  SKIN: No rashes or lesions    LABS:                        12.8   8.0   )-----------( 145      ( 20 Sep 2019 05:39 )             35.2     09-20    134<L>  |  93<L>  |  34<H>  ----------------------------<  187<H>  3.9   |  26  |  5.55<H>    Ca    9.5      20 Sep 2019 05:39    TPro  6.7  /  Alb  3.5  /  TBili  0.3  /  DBili  x   /  AST  6<L>  /  ALT  5<L>  /  AlkPhos  75  09-19    PT/INR - ( 20 Sep 2019 05:39 )   PT: 12.9 sec;   INR: 1.13 ratio         PTT - ( 20 Sep 2019 11:23 )  PTT:76.3 sec            All consultant(s) notes reviewed and care discussed with other providers        Contact Number, Dr Cagle [phone]

## 2019-09-20 NOTE — PROGRESS NOTE ADULT - SUBJECTIVE AND OBJECTIVE BOX
[facility] - NII Knight / NII Mendiola / JASWANT Arroyo/ NII Mackey/ NII Macias/ BRIGITTE Esposito / ARAMIS Calle / LUDA Bowen  ---------------------------------------------------------------------------------------------------------------  seen and examined today for ESRD on HD  Interval : due for CABG Monday  VITALS:  T(F): 98 (09-20-19 @ 04:49), Max: 98.1 (09-19-19 @ 13:05)  HR: 74 (09-20-19 @ 04:49)  BP: 164/79 (09-20-19 @ 04:49)  RR: 18 (09-20-19 @ 04:49)  SpO2: 92% (09-20-19 @ 04:49)  Wt(kg): --    09-19 @ 07:01  -  09-20 @ 07:00  --------------------------------------------------------  IN: 0 mL / OUT: 2500 mL / NET: -2500 mL    09-20 @ 07:01  -  09-20 @ 11:10  --------------------------------------------------------  IN: 420 mL / OUT: 0 mL / NET: 420 mL      Physical Exam :-  Constitutional: NAD  Neck: Supple.  Respiratory: Bilateral equal breath sounds,  Cardiovascular: S1, S2 normal,  Gastrointestinal: Bowel Sounds present, soft, non tender.  Extremities: No edema  Neurological: Alert and Oriented x 3, no focal deficits  Psychiatric: Normal mood, normal affect  Data:-  Allergies :   lisinopril (Other)    Hospital Medications:   MEDICATIONS  (STANDING):  amLODIPine   Tablet 10 milliGRAM(s) Oral daily  aspirin  chewable 81 milliGRAM(s) Oral daily  atorvastatin 80 milliGRAM(s) Oral at bedtime  chlorhexidine 0.12% Liquid 15 milliLiter(s) Swish and Spit once  chlorhexidine 2% Cloths 1 Application(s) Topical <User Schedule>  docusate sodium 100 milliGRAM(s) Oral three times a day  glycopyrrolate 9 MICROgram(s)/formoterol 4.8 MICROgram(s)Inhaler 2 Puff(s) Inhalation two times a day  heparin  Infusion. 1100 Unit(s)/Hr (11 mL/Hr) IV Continuous <Continuous>  insulin lispro (HumaLOG) corrective regimen sliding scale   SubCutaneous three times a day before meals  insulin lispro (HumaLOG) corrective regimen sliding scale   SubCutaneous at bedtime  ketotifen 0.025% Ophthalmic Solution 1 Drop(s) Both EYES two times a day  methimazole 5 milliGRAM(s) Oral daily  metoprolol tartrate 25 milliGRAM(s) Oral every 8 hours  pantoprazole    Tablet 40 milliGRAM(s) Oral before breakfast  sevelamer carbonate 1600 milliGRAM(s) Oral three times a day with meals  sodium chloride 0.9% lock flush 3 milliLiter(s) IV Push every 8 hours  tiotropium 18 MICROgram(s) Capsule 1 Capsule(s) Inhalation daily    09-20    134<L>  |  93<L>  |  34<H>  ----------------------------<  187<H>  3.9   |  26  |  5.55<H>    Ca    9.5      20 Sep 2019 05:39    TPro  6.7  /  Alb  3.5  /  TBili  0.3  /  DBili      /  AST  6<L>  /  ALT  5<L>  /  AlkPhos  75  09-19    Creatinine Trend: 5.55 <--, 8.20 <--, 6.59 <--, 8.66 <--, 8.24 <--, 7.80 <--                        12.8   8.0   )-----------( 145      ( 20 Sep 2019 05:39 )             35.2

## 2019-09-20 NOTE — PROGRESS NOTE ADULT - ASSESSMENT
69M ESRD on HD(Tu/Th/Sa), HTN, CAD s/p PCI with 15 stents placed, s/p cardiac cath today which showed LM disease with 90%.  Pre op for CABG 9/23.  Heparin gtt  9/19  HD stable.    Cont hep gtt.  OR Monday 9/20 Hep gtt Painfree

## 2019-09-20 NOTE — PROVIDER CONTACT NOTE (CRITICAL VALUE NOTIFICATION) - BACKGROUND
Pt admiotted for Atherosclerosis of native coronary artery without angina, on Heparin gtt ACS nomogram

## 2019-09-20 NOTE — PROGRESS NOTE ADULT - ASSESSMENT
69M ESRD on HD(Tu/Th/Sa), HTN, CAD s/p PCI with 15 stents placed, s/p cardiac cath today which showed LM disease with 90%, now transferred to [facility] for CT Surgery evaluation.

## 2019-09-20 NOTE — PROVIDER CONTACT NOTE (CRITICAL VALUE NOTIFICATION) - ASSESSMENT
APTT value >200, pt A&Ox4. No s/s of bleed. Suspected contamination when drawing APTT. Requesting a re-draw to confirm APTT value

## 2019-09-21 LAB
ALBUMIN SERPL ELPH-MCNC: 3.7 G/DL — SIGNIFICANT CHANGE UP (ref 3.3–5)
ALP SERPL-CCNC: 67 U/L — SIGNIFICANT CHANGE UP (ref 40–120)
ALT FLD-CCNC: 13 U/L — SIGNIFICANT CHANGE UP (ref 10–45)
ANION GAP SERPL CALC-SCNC: 17 MMOL/L — SIGNIFICANT CHANGE UP (ref 5–17)
APTT BLD: 114.2 SEC — HIGH (ref 27.5–36.3)
APTT BLD: 53 SEC — HIGH (ref 27.5–36.3)
AST SERPL-CCNC: 11 U/L — SIGNIFICANT CHANGE UP (ref 10–40)
BILIRUB SERPL-MCNC: 0.4 MG/DL — SIGNIFICANT CHANGE UP (ref 0.2–1.2)
BLD GP AB SCN SERPL QL: NEGATIVE — SIGNIFICANT CHANGE UP
BUN SERPL-MCNC: 57 MG/DL — HIGH (ref 7–23)
CALCIUM SERPL-MCNC: 9.5 MG/DL — SIGNIFICANT CHANGE UP (ref 8.4–10.5)
CHLORIDE SERPL-SCNC: 91 MMOL/L — LOW (ref 96–108)
CO2 SERPL-SCNC: 24 MMOL/L — SIGNIFICANT CHANGE UP (ref 22–31)
CREAT SERPL-MCNC: 7.49 MG/DL — HIGH (ref 0.5–1.3)
GLUCOSE BLDC GLUCOMTR-MCNC: 124 MG/DL — HIGH (ref 70–99)
GLUCOSE BLDC GLUCOMTR-MCNC: 156 MG/DL — HIGH (ref 70–99)
GLUCOSE BLDC GLUCOMTR-MCNC: 175 MG/DL — HIGH (ref 70–99)
GLUCOSE BLDC GLUCOMTR-MCNC: 187 MG/DL — HIGH (ref 70–99)
GLUCOSE SERPL-MCNC: 133 MG/DL — HIGH (ref 70–99)
HCT VFR BLD CALC: 34.1 % — LOW (ref 39–50)
HGB BLD-MCNC: 11.9 G/DL — LOW (ref 13–17)
MCHC RBC-ENTMCNC: 30.9 PG — SIGNIFICANT CHANGE UP (ref 27–34)
MCHC RBC-ENTMCNC: 34.8 GM/DL — SIGNIFICANT CHANGE UP (ref 32–36)
MCV RBC AUTO: 88.7 FL — SIGNIFICANT CHANGE UP (ref 80–100)
PLATELET # BLD AUTO: 142 K/UL — LOW (ref 150–400)
POTASSIUM SERPL-MCNC: 4.6 MMOL/L — SIGNIFICANT CHANGE UP (ref 3.5–5.3)
POTASSIUM SERPL-SCNC: 4.6 MMOL/L — SIGNIFICANT CHANGE UP (ref 3.5–5.3)
PROT SERPL-MCNC: 6.9 G/DL — SIGNIFICANT CHANGE UP (ref 6–8.3)
RBC # BLD: 3.84 M/UL — LOW (ref 4.2–5.8)
RBC # FLD: 11.7 % — SIGNIFICANT CHANGE UP (ref 10.3–14.5)
RH IG SCN BLD-IMP: POSITIVE — SIGNIFICANT CHANGE UP
SODIUM SERPL-SCNC: 132 MMOL/L — LOW (ref 135–145)
WBC # BLD: 7.2 K/UL — SIGNIFICANT CHANGE UP (ref 3.8–10.5)
WBC # FLD AUTO: 7.2 K/UL — SIGNIFICANT CHANGE UP (ref 3.8–10.5)

## 2019-09-21 PROCEDURE — 99232 SBSQ HOSP IP/OBS MODERATE 35: CPT

## 2019-09-21 RX ADMIN — Medication 2: at 17:55

## 2019-09-21 RX ADMIN — INSULIN GLARGINE 5 UNIT(S): 100 INJECTION, SOLUTION SUBCUTANEOUS at 22:07

## 2019-09-21 RX ADMIN — HEPARIN SODIUM 0 UNIT(S)/HR: 5000 INJECTION INTRAVENOUS; SUBCUTANEOUS at 19:06

## 2019-09-21 RX ADMIN — PANTOPRAZOLE SODIUM 40 MILLIGRAM(S): 20 TABLET, DELAYED RELEASE ORAL at 05:33

## 2019-09-21 RX ADMIN — SEVELAMER CARBONATE 1600 MILLIGRAM(S): 2400 POWDER, FOR SUSPENSION ORAL at 10:02

## 2019-09-21 RX ADMIN — SODIUM CHLORIDE 3 MILLILITER(S): 9 INJECTION INTRAMUSCULAR; INTRAVENOUS; SUBCUTANEOUS at 14:20

## 2019-09-21 RX ADMIN — Medication 25 MILLIGRAM(S): at 05:33

## 2019-09-21 RX ADMIN — SEVELAMER CARBONATE 1600 MILLIGRAM(S): 2400 POWDER, FOR SUSPENSION ORAL at 12:12

## 2019-09-21 RX ADMIN — Medication 81 MILLIGRAM(S): at 12:11

## 2019-09-21 RX ADMIN — SEVELAMER CARBONATE 1600 MILLIGRAM(S): 2400 POWDER, FOR SUSPENSION ORAL at 17:56

## 2019-09-21 RX ADMIN — HEPARIN SODIUM 1150 UNIT(S)/HR: 5000 INJECTION INTRAVENOUS; SUBCUTANEOUS at 20:11

## 2019-09-21 RX ADMIN — Medication 25 MILLIGRAM(S): at 22:06

## 2019-09-21 RX ADMIN — SODIUM CHLORIDE 3 MILLILITER(S): 9 INJECTION INTRAMUSCULAR; INTRAVENOUS; SUBCUTANEOUS at 22:07

## 2019-09-21 RX ADMIN — GLYCOPYRROLATE AND FORMOTEROL FUMARATE 2 PUFF(S): 9; 4.8 AEROSOL, METERED RESPIRATORY (INHALATION) at 17:56

## 2019-09-21 RX ADMIN — Medication 2: at 10:01

## 2019-09-21 RX ADMIN — AMLODIPINE BESYLATE 10 MILLIGRAM(S): 2.5 TABLET ORAL at 05:33

## 2019-09-21 RX ADMIN — Medication 25 MILLIGRAM(S): at 15:14

## 2019-09-21 RX ADMIN — TIOTROPIUM BROMIDE 1 CAPSULE(S): 18 CAPSULE ORAL; RESPIRATORY (INHALATION) at 12:12

## 2019-09-21 RX ADMIN — KETOTIFEN FUMARATE 1 DROP(S): 0.34 SOLUTION OPHTHALMIC at 05:33

## 2019-09-21 RX ADMIN — GLYCOPYRROLATE AND FORMOTEROL FUMARATE 2 PUFF(S): 9; 4.8 AEROSOL, METERED RESPIRATORY (INHALATION) at 05:34

## 2019-09-21 RX ADMIN — SODIUM CHLORIDE 3 MILLILITER(S): 9 INJECTION INTRAMUSCULAR; INTRAVENOUS; SUBCUTANEOUS at 05:34

## 2019-09-21 RX ADMIN — Medication 2: at 12:17

## 2019-09-21 RX ADMIN — KETOTIFEN FUMARATE 1 DROP(S): 0.34 SOLUTION OPHTHALMIC at 17:56

## 2019-09-21 RX ADMIN — HEPARIN SODIUM 1400 UNIT(S)/HR: 5000 INJECTION INTRAVENOUS; SUBCUTANEOUS at 10:04

## 2019-09-21 RX ADMIN — Medication 100 MILLIGRAM(S): at 22:05

## 2019-09-21 RX ADMIN — CHLORHEXIDINE GLUCONATE 1 APPLICATION(S): 213 SOLUTION TOPICAL at 12:14

## 2019-09-21 RX ADMIN — ATORVASTATIN CALCIUM 80 MILLIGRAM(S): 80 TABLET, FILM COATED ORAL at 22:05

## 2019-09-21 NOTE — PROGRESS NOTE ADULT - ASSESSMENT
69M ESRD on HD(Tu/Th/Sa), HTN, CAD s/p PCI with 15 stents placed, s/p cardiac cath today which showed LM disease with 90%.  Pre op for CABG 9/23.  Heparin gtt  9/19  HD stable.    Cont hep gtt.  OR Monday 9/20 Hep gtt Painfree  9/21: Denies any chest discomfort.

## 2019-09-21 NOTE — PROGRESS NOTE ADULT - SUBJECTIVE AND OBJECTIVE BOX
Patient is a 69y old  Male who presents with a chief complaint of Preop CABG (20 Sep 2019 16:23)      SUBJECTIVE / OVERNIGHT EVENTS: overnight events noted    ROS:  Resp: No cough no sputum production  CVS: No chest pain no palpitations no orthopnea  GI: no N/V/D  : no dysuria, no hematuria  Neuro: no weakness no paresthesias  Heme: No petechiae no easy bruising  Msk: No joint pain no swelling  Skin: No rash no itching        MEDICATIONS  (STANDING):  amLODIPine   Tablet 10 milliGRAM(s) Oral daily  aspirin  chewable 81 milliGRAM(s) Oral daily  atorvastatin 80 milliGRAM(s) Oral at bedtime  chlorhexidine 0.12% Liquid 15 milliLiter(s) Swish and Spit once  chlorhexidine 2% Cloths 1 Application(s) Topical <User Schedule>  docusate sodium 100 milliGRAM(s) Oral three times a day  glycopyrrolate 9 MICROgram(s)/formoterol 4.8 MICROgram(s)Inhaler 2 Puff(s) Inhalation two times a day  heparin  Infusion. 1100 Unit(s)/Hr (11 mL/Hr) IV Continuous <Continuous>  insulin glargine Injectable (LANTUS) 5 Unit(s) SubCutaneous at bedtime  insulin lispro (HumaLOG) corrective regimen sliding scale   SubCutaneous three times a day before meals  insulin lispro (HumaLOG) corrective regimen sliding scale   SubCutaneous at bedtime  ketotifen 0.025% Ophthalmic Solution 1 Drop(s) Both EYES two times a day  methimazole 5 milliGRAM(s) Oral daily  metoprolol tartrate 25 milliGRAM(s) Oral every 8 hours  pantoprazole    Tablet 40 milliGRAM(s) Oral before breakfast  sevelamer carbonate 1600 milliGRAM(s) Oral three times a day with meals  sodium chloride 0.9% lock flush 3 milliLiter(s) IV Push every 8 hours  tiotropium 18 MICROgram(s) Capsule 1 Capsule(s) Inhalation daily    MEDICATIONS  (PRN):  ALBUTerol    90 MICROgram(s) HFA Inhaler 2 Puff(s) Inhalation every 4 hours PRN Wheezing        CAPILLARY BLOOD GLUCOSE      POCT Blood Glucose.: 175 mg/dL (21 Sep 2019 12:16)  POCT Blood Glucose.: 156 mg/dL (21 Sep 2019 09:28)  POCT Blood Glucose.: 152 mg/dL (20 Sep 2019 21:52)  POCT Blood Glucose.: 154 mg/dL (20 Sep 2019 17:00)    I&O's Summary    20 Sep 2019 07:01  -  21 Sep 2019 07:00  --------------------------------------------------------  IN: 828 mL / OUT: 0 mL / NET: 828 mL    21 Sep 2019 07:01  -  21 Sep 2019 15:48  --------------------------------------------------------  IN: 800 mL / OUT: 0 mL / NET: 800 mL        Vital Signs Last 24 Hrs  T(C): 37 (21 Sep 2019 13:31), Max: 37.1 (20 Sep 2019 21:07)  T(F): 98.6 (21 Sep 2019 13:31), Max: 98.8 (20 Sep 2019 21:07)  HR: 65 (21 Sep 2019 13:31) (61 - 70)  BP: 150/68 (21 Sep 2019 13:31) (150/68 - 175/80)  BP(mean): --  RR: 18 (21 Sep 2019 13:31) (16 - 18)  SpO2: 90% (21 Sep 2019 13:31) (90% - 95%)    PHYSICAL EXAM:  GENERAL: NAD, well-developed  HEAD:  Atraumatic, Normocephalic  EYES: EOMI, PERRLA, conjunctiva and sclera clear  NECK: Supple, No JVD  CHEST/LUNG: Clear to auscultation bilaterally; No wheeze  HEART: S1S2; soft ejection systolic murmur best heard at left sternal border no rub no gallop   ABDOMEN: Soft, Nontender, Nondistended; Bowel sounds present  EXTREMITIES:  + Peripheral Pulses, No clubbing or cyanosis, no edema  PSYCH: AO x 3,   NEUROLOGY: Alert, no focal motor or sensory deficits  SKIN: No rashes or lesions    LABS:                        11.9   7.2   )-----------( 142      ( 21 Sep 2019 06:54 )             34.1     09-21    132<L>  |  91<L>  |  57<H>  ----------------------------<  133<H>  4.6   |  24  |  7.49<H>    Ca    9.5      21 Sep 2019 06:54    TPro  6.9  /  Alb  3.7  /  TBili  0.4  /  DBili  x   /  AST  11  /  ALT  13  /  AlkPhos  67  09-21    PT/INR - ( 20 Sep 2019 21:54 )   PT: 13.0 sec;   INR: 1.13 ratio         PTT - ( 21 Sep 2019 06:54 )  PTT:53.0 sec            All consultant(s) notes reviewed and care discussed with other providers        Contact Number, Dr Cagle [phone]

## 2019-09-21 NOTE — PROGRESS NOTE ADULT - ATTENDING COMMENTS
Patient seen and examined.  Agree with above.   -CABG monday per CTS  -continue with hep gtt if no contraindications    Kalie Lau MD

## 2019-09-21 NOTE — PROGRESS NOTE ADULT - ASSESSMENT
69 y.o. male with pmh of ESRD( HD on tu/Th/sa) at [facility] via LUE AVF. Last dialyzed yesterday 9/14, was sent to [facility] for cath after presenting with SOB and a positive stress test. Transferred to [facility] for evaluation for CABG.

## 2019-09-21 NOTE — PROGRESS NOTE ADULT - SUBJECTIVE AND OBJECTIVE BOX
pt seen and examined, no complaints, ROS - .          ALBUTerol    90 MICROgram(s) HFA Inhaler 2 Puff(s) Inhalation every 4 hours PRN  amLODIPine   Tablet 10 milliGRAM(s) Oral daily  aspirin  chewable 81 milliGRAM(s) Oral daily  atorvastatin 80 milliGRAM(s) Oral at bedtime  chlorhexidine 0.12% Liquid 15 milliLiter(s) Swish and Spit once  chlorhexidine 2% Cloths 1 Application(s) Topical <User Schedule>  docusate sodium 100 milliGRAM(s) Oral three times a day  glycopyrrolate 9 MICROgram(s)/formoterol 4.8 MICROgram(s)Inhaler 2 Puff(s) Inhalation two times a day  heparin  Infusion. 1100 Unit(s)/Hr IV Continuous <Continuous>  insulin glargine Injectable (LANTUS) 5 Unit(s) SubCutaneous at bedtime  insulin lispro (HumaLOG) corrective regimen sliding scale   SubCutaneous three times a day before meals  insulin lispro (HumaLOG) corrective regimen sliding scale   SubCutaneous at bedtime  ketotifen 0.025% Ophthalmic Solution 1 Drop(s) Both EYES two times a day  methimazole 5 milliGRAM(s) Oral daily  metoprolol tartrate 25 milliGRAM(s) Oral every 8 hours  pantoprazole    Tablet 40 milliGRAM(s) Oral before breakfast  sevelamer carbonate 1600 milliGRAM(s) Oral three times a day with meals  sodium chloride 0.9% lock flush 3 milliLiter(s) IV Push every 8 hours  tiotropium 18 MICROgram(s) Capsule 1 Capsule(s) Inhalation daily                            12.8   8.0   )-----------( 145      ( 20 Sep 2019 05:39 )             35.2       Hemoglobin: 12.8 g/dL ( @ 05:39)  Hemoglobin: 11.6 g/dL ( @ 03:59)  Hemoglobin: 12.6 g/dL ( @ 05:08)  Hemoglobin: 11.5 g/dL ( @ 01:30)  Hemoglobin: 13.3 g/dL ( @ 17:12)          134<L>  |  93<L>  |  34<H>  ----------------------------<  187<H>  3.9   |  26  |  5.55<H>    Ca    9.5      20 Sep 2019 05:39      Creatinine Trend: 5.55<--, 8.20<--, 6.59<--, 8.66<--, 8.24<--, 7.80<--    COAGS: PT/INR - ( 20 Sep 2019 21:54 )   PT: 13.0 sec;   INR: 1.13 ratio         PTT - ( 20 Sep 2019 21:54 )  PTT:35.6 sec          T(C): 37.1 (19 @ 21:07), Max: 37.1 (19 @ 21:07)  HR: 70 (19 @ 21:07) (63 - 74)  BP: 175/80 (19 @ 21:07) (157/71 - 175/80)  RR: 16 (19 @ 21:07) (16 - 18)  SpO2: 95% (19 @ 21:07) (92% - 98%)  Wt(kg): --    I&O's Summary    19 Sep 2019 07:  -  20 Sep 2019 07:00  --------------------------------------------------------  IN: 0 mL / OUT: 2500 mL / NET: -2500 mL    20 Sep 2019 07:  -  21 Sep 2019 04:42  --------------------------------------------------------  IN: 660 mL / OUT: 0 mL / NET: 660 mL        Gen: Appears well in NAD  HEENT:  (-)icterus (-)pallor  CV: N S1 S2 1/6 DEJAH (+)2 Pulses B/l  Resp:  Clear to auscultation B/L, normal effort  GI: (+) BS Soft, NT, ND  Lymph:  (-)Edema, (-)obvious lymphadenopathy  Skin: Warm to touch, Normal turgor  Psych: Appropriate mood and affect    TELEMETRY: NSR 70-80      < from: Cardiac Cath Lab - Adult (19 @ 12:28) >  PROCEDURE:  --  Left heart catheterization.  --  Left coronary angiography.  --  Right coronary angiography.  --  Sonosite - Diagnostic.  VENTRICLES: No LV gram was performed; however, a recent echocardiogram  demonstrated normal global and regional LV function.  CORONARY VESSELS: The coronary circulation is right dominant.  LM:   --  Ostial LM: There was a discrete 90 % stenosis.  LAD:   --  Mid LAD: Angiography showed minor luminal irregularities with no  flow limiting lesions. The stent in the mid LAD is patent.  --  Distal LAD: The vessel was small sized. Angiography showed moderate  atherosclerosis.  CX:   --  Mid circumflex: There was a tubular 50 % stenosis at the site of  a prior stent.  RI:   --  Proximal ramus intermedius: There was a diffuse 50 % stenosis at  the site of a prior stent.  RCA:   --  Mid RCA: There was a tubular 50 % stenosis at the site of a  prior stent.  --  RPLS: Angiography showed minor luminal irregularities with no flow  limiting lesions.  COMPLICATIONS: There were no complications.  DIAGNOSTIC RECOMMENDATIONS: Consultation with a cardiac surgeon be obtained  for surgical opinion and coronary artery bypass grafting.  Prepared and signed by  Jessy Méndez M.D.  Signed 2019 13:42:23  HEMODYNAMIC TABLES  Pressures:  NO PHASE  Pressures:  - HR: 69  Pressures:  - Rhythm:  Pressures:  -- Aortic Pressure (S/D/M): 170/70/113  Pressures:  -- Left Ventricle (s/edp): 164/28/--  Outputs:  NO PHASE  Outputs:  -- CALCULATIONS[age]  Outputs:  -- CALCULATIONS: Body Surface Area: 1.92  Outputs:  -- CALCULATIONS: Height in cm: 168.00  Outputs:  -- CALCULATIONS: Sex: Male  Outputs:  -- CALCULATIONS: Weight in k.60  Outputs:  -- OUTPUTS: O2 consumption: 239.38  Outputs:  -- OUTPUTS: Vo2 Indexed: 125.00  < end of copied text >    ASSESSMENT/PLAN: 68 y/o male with PMHx of CAD, HTN, HLD, T2DM, and ESRD on HD presented after abnormal stress test to [facility] for elective cardiac catheterization. Patient admitted post cath after cath revealed severe ostial LM disease. Now transferred to [facility] for CABG.  	  - Cath revealed severe ostial LM disease  - Tele remains stable in NSR  - Continue ASA/Statin/BB  - HD per renal  - Continue hep gtt  - F/u CTS plan for CABG on Monday

## 2019-09-21 NOTE — PROGRESS NOTE ADULT - ASSESSMENT
69M ESRD on HD(Tu/Th/Sa), HTN, CAD s/p PCI with 15 stents placed, s/p cardiac cath today which showed LM disease with 90%, now transferred to [facility] for CT Surgery evaluation.

## 2019-09-21 NOTE — PROGRESS NOTE ADULT - SUBJECTIVE AND OBJECTIVE BOX
Nephrology Followup Note - [phone] - Dr Mendiola / Dr Knight / Dr Macias / Dr Arroyo / Dr Mackey / Dr Bowen / Dr Esposito / Dr Calle  Pt seen and examined at bedside  No acute events overnight. No complaints.     Allergies:  lisinopril (Other)    Hospital Medications:   MEDICATIONS  (STANDING):  amLODIPine   Tablet 10 milliGRAM(s) Oral daily  aspirin  chewable 81 milliGRAM(s) Oral daily  atorvastatin 80 milliGRAM(s) Oral at bedtime  chlorhexidine 0.12% Liquid 15 milliLiter(s) Swish and Spit once  chlorhexidine 2% Cloths 1 Application(s) Topical <User Schedule>  docusate sodium 100 milliGRAM(s) Oral three times a day  glycopyrrolate 9 MICROgram(s)/formoterol 4.8 MICROgram(s)Inhaler 2 Puff(s) Inhalation two times a day  heparin  Infusion. 1100 Unit(s)/Hr (11 mL/Hr) IV Continuous <Continuous>  insulin glargine Injectable (LANTUS) 5 Unit(s) SubCutaneous at bedtime  insulin lispro (HumaLOG) corrective regimen sliding scale   SubCutaneous three times a day before meals  insulin lispro (HumaLOG) corrective regimen sliding scale   SubCutaneous at bedtime  ketotifen 0.025% Ophthalmic Solution 1 Drop(s) Both EYES two times a day  methimazole 5 milliGRAM(s) Oral daily  metoprolol tartrate 25 milliGRAM(s) Oral every 8 hours  pantoprazole    Tablet 40 milliGRAM(s) Oral before breakfast  sevelamer carbonate 1600 milliGRAM(s) Oral three times a day with meals  sodium chloride 0.9% lock flush 3 milliLiter(s) IV Push every 8 hours  tiotropium 18 MICROgram(s) Capsule 1 Capsule(s) Inhalation daily    VITALS:  T(F): 98.6 (19 @ 13:31), Max: 98.8 (19 @ 21:07)  HR: 65 (19 @ 13:31)  BP: 150/68 (19 @ 13:31)  RR: 18 (19 @ 13:31)  SpO2: 90% (19 @ 13:31)  Wt(kg): --     @ 07: @ 07:00  --------------------------------------------------------  IN: 828 mL / OUT: 0 mL / NET: 828 mL     @ 07: @ 14:00  --------------------------------------------------------  IN: 800 mL / OUT: 0 mL / NET: 800 mL        PHYSICAL EXAM:  Constitutional: NAD  HEENT: anicteric sclera, oropharynx clear, MMM  Neck: No JVD  Respiratory: CTAB, no wheezes, rales or rhonchi  Cardiovascular: S1, S2, RRR  Gastrointestinal: BS+, soft, NT/ND  Extremities: No cyanosis or clubbing. No peripheral edema  Neurological: A/O x 3, no focal deficits  Psychiatric: Normal mood, normal affect  : No CVA tenderness. No wagner.   Skin: No rashes  Vascular Access: LUE AV access +thrill and bruit.     LABS:      132<L>  |  91<L>  |  57<H>  ----------------------------<  133<H>  4.6   |  24  |  7.49<H>    Ca    9.5      21 Sep 2019 06:54    TPro  6.9  /  Alb  3.7  /  TBili  0.4  /  DBili      /  AST  11  /  ALT  13  /  AlkPhos  67      Creatinine Trend: 7.49 <--, 5.55 <--, 8.20 <--, 6.59 <--, 8.66 <--, 8.24 <--, 7.80 <--                        11.9   7.2   )-----------( 142      ( 21 Sep 2019 06:54 )             34.1     Urine Studies:  Urinalysis Basic - ( 16 Sep 2019 17:12 )    Color: Light Yellow / Appearance: Clear / S.016 / pH:   Gluc:  / Ketone: Negative  / Bili: Negative / Urobili: Negative   Blood:  / Protein: 100 mg/dL / Nitrite: Negative   Leuk Esterase: Negative / RBC: 1 /hpf / WBC 1 /HPF   Sq Epi:  / Non Sq Epi: 0 /hpf / Bacteria: Negative        RADIOLOGY & ADDITIONAL STUDIES:

## 2019-09-21 NOTE — PROGRESS NOTE ADULT - PROBLEM SELECTOR PLAN 1
Plan for repeat HD today.   UF goal 1.5kg, as BP tolerates,  electrolytes acceptable.    Planned for CABG on Monday  Daily BMP  Renal diet  dose meds for ESRD

## 2019-09-21 NOTE — PROGRESS NOTE ADULT - SUBJECTIVE AND OBJECTIVE BOX
VITAL SIGNS    Telemetry:    Vital Signs Last 24 Hrs  T(C): 36.9 (09-21-19 @ 04:57), Max: 37.1 (09-20-19 @ 21:07)  T(F): 98.5 (09-21-19 @ 04:57), Max: 98.8 (09-20-19 @ 21:07)  HR: 61 (09-21-19 @ 04:57) (61 - 70)  BP: 155/87 (09-21-19 @ 04:57) (155/87 - 175/80)  RR: 17 (09-21-19 @ 04:57) (16 - 18)  SpO2: 95% (09-21-19 @ 04:57) (95% - 98%)            09-20 @ 07:01  -  09-21 @ 07:00  --------------------------------------------------------  IN: 828 mL / OUT: 0 mL / NET: 828 mL       Daily     Daily   Admit Wt: Drug Dosing Weight  Height (cm): 167.6 (16 Sep 2019 15:55)  Weight (kg): 83.7 (16 Sep 2019 15:55)  BMI (kg/m2): 29.8 (16 Sep 2019 15:55)  BSA (m2): 1.93 (16 Sep 2019 15:55)     Daily     LABS  09-21    132<L>  |  91<L>  |  57<H>  ----------------------------<  133<H>  4.6   |  24  |  7.49<H>    Ca    9.5      21 Sep 2019 06:54    TPro  6.9  /  Alb  3.7  /  TBili  0.4  /  DBili  x   /  AST  11  /  ALT  13  /  AlkPhos  67  09-21                                 11.9   7.2   )-----------( 142      ( 21 Sep 2019 06:54 )             34.1          PT/INR - ( 20 Sep 2019 21:54 )   PT: 13.0 sec;   INR: 1.13 ratio         PTT - ( 21 Sep 2019 06:54 )  PTT:53.0 sec        Bilirubin Total, Serum: 0.4 mg/dL (09-21 @ 06:54)    CAPILLARY BLOOD GLUCOSE      POCT Blood Glucose.: 156 mg/dL (21 Sep 2019 09:28)  POCT Blood Glucose.: 152 mg/dL (20 Sep 2019 21:52)  POCT Blood Glucose.: 154 mg/dL (20 Sep 2019 17:00)  POCT Blood Glucose.: 227 mg/dL (20 Sep 2019 11:31)          Drains:     MS       [  ]   [  ]            L Pleural [  ]            R Pleural  [  ]            SALOMON  [  ]           Madrigal  [  ]    Pacing Wires      [  ]   Settings:                                  Isolated  [  ]                    CXR:      MEDICATIONS  ALBUTerol    90 MICROgram(s) HFA Inhaler 2 Puff(s) Inhalation every 4 hours PRN  amLODIPine   Tablet 10 milliGRAM(s) Oral daily  aspirin  chewable 81 milliGRAM(s) Oral daily  atorvastatin 80 milliGRAM(s) Oral at bedtime  chlorhexidine 0.12% Liquid 15 milliLiter(s) Swish and Spit once  chlorhexidine 2% Cloths 1 Application(s) Topical <User Schedule>  docusate sodium 100 milliGRAM(s) Oral three times a day  glycopyrrolate 9 MICROgram(s)/formoterol 4.8 MICROgram(s)Inhaler 2 Puff(s) Inhalation two times a day  heparin  Infusion. 1100 Unit(s)/Hr IV Continuous <Continuous>  insulin glargine Injectable (LANTUS) 5 Unit(s) SubCutaneous at bedtime  insulin lispro (HumaLOG) corrective regimen sliding scale   SubCutaneous three times a day before meals  insulin lispro (HumaLOG) corrective regimen sliding scale   SubCutaneous at bedtime  ketotifen 0.025% Ophthalmic Solution 1 Drop(s) Both EYES two times a day  methimazole 5 milliGRAM(s) Oral daily  metoprolol tartrate 25 milliGRAM(s) Oral every 8 hours  pantoprazole    Tablet 40 milliGRAM(s) Oral before breakfast  sevelamer carbonate 1600 milliGRAM(s) Oral three times a day with meals  sodium chloride 0.9% lock flush 3 milliLiter(s) IV Push every 8 hours  tiotropium 18 MICROgram(s) Capsule 1 Capsule(s) Inhalation daily      PHYSICAL EXAM      Neurology: alert and oriented x 3, nonfocal, no gross deficits  CV :S1S2  Sternal Wound :  CDI , Stable  Lungs: cta occ fine crackles to bases b/l  Abdomen: soft, nontender, nondistended, positive bowel sounds, last bowel movement   :    voids   Extremities:  warm to touch no edema                PAST MEDICAL & SURGICAL HISTORY:  ESRD (end stage renal disease) on dialysis: T-Th-Sat  Stented coronary artery: total 15 stents from 2537-8322  Hyperthyroidism  H/O: CVA: after cardiac stent placed 12/15/09 -no residual  Heart Attack: 2/1/07 with subsequent stent  Coronary Stent: [id number] 10 stents,  to Ramus 1/2015, cath 01/2016 ( see results in HPI)  Hypercholesterolemia  CAD (Coronary Artery Disease)  DM (Diabetes Mellitus): x 4 yrs without N/N/R  HTN (Hypertension)  Hemodialysis access, AV graft: 5/2015, L arm  S/P cataract extraction  Boil of Buttock: 2006 and 2008                 Discussed with Cardiothoracic Team at AM rounds.

## 2019-09-22 ENCOUNTER — TRANSCRIPTION ENCOUNTER (OUTPATIENT)
Age: 69
End: 2019-09-22

## 2019-09-22 DIAGNOSIS — E05.90 THYROTOXICOSIS, UNSPECIFIED WITHOUT THYROTOXIC CRISIS OR STORM: ICD-10-CM

## 2019-09-22 LAB
ANION GAP SERPL CALC-SCNC: 17 MMOL/L — SIGNIFICANT CHANGE UP (ref 5–17)
APTT BLD: 52.5 SEC — HIGH (ref 27.5–36.3)
APTT BLD: 53.8 SEC — HIGH (ref 27.5–36.3)
BLD GP AB SCN SERPL QL: NEGATIVE — SIGNIFICANT CHANGE UP
BUN SERPL-MCNC: 39 MG/DL — HIGH (ref 7–23)
CALCIUM SERPL-MCNC: 9.3 MG/DL — SIGNIFICANT CHANGE UP (ref 8.4–10.5)
CHLORIDE SERPL-SCNC: 90 MMOL/L — LOW (ref 96–108)
CO2 SERPL-SCNC: 26 MMOL/L — SIGNIFICANT CHANGE UP (ref 22–31)
CREAT SERPL-MCNC: 5.34 MG/DL — HIGH (ref 0.5–1.3)
GLUCOSE BLDC GLUCOMTR-MCNC: 106 MG/DL — HIGH (ref 70–99)
GLUCOSE BLDC GLUCOMTR-MCNC: 118 MG/DL — HIGH (ref 70–99)
GLUCOSE BLDC GLUCOMTR-MCNC: 151 MG/DL — HIGH (ref 70–99)
GLUCOSE BLDC GLUCOMTR-MCNC: 168 MG/DL — HIGH (ref 70–99)
GLUCOSE BLDC GLUCOMTR-MCNC: 169 MG/DL — HIGH (ref 70–99)
GLUCOSE SERPL-MCNC: 401 MG/DL — HIGH (ref 70–99)
HCT VFR BLD CALC: 36.8 % — LOW (ref 39–50)
HGB BLD-MCNC: 11.7 G/DL — LOW (ref 13–17)
MCHC RBC-ENTMCNC: 29.3 PG — SIGNIFICANT CHANGE UP (ref 27–34)
MCHC RBC-ENTMCNC: 31.8 GM/DL — LOW (ref 32–36)
MCV RBC AUTO: 92 FL — SIGNIFICANT CHANGE UP (ref 80–100)
PLATELET # BLD AUTO: 168 K/UL — SIGNIFICANT CHANGE UP (ref 150–400)
POTASSIUM SERPL-MCNC: 4.4 MMOL/L — SIGNIFICANT CHANGE UP (ref 3.5–5.3)
POTASSIUM SERPL-SCNC: 4.4 MMOL/L — SIGNIFICANT CHANGE UP (ref 3.5–5.3)
RBC # BLD: 4 M/UL — LOW (ref 4.2–5.8)
RBC # FLD: 12.1 % — SIGNIFICANT CHANGE UP (ref 10.3–14.5)
RH IG SCN BLD-IMP: POSITIVE — SIGNIFICANT CHANGE UP
SODIUM SERPL-SCNC: 133 MMOL/L — LOW (ref 135–145)
WBC # BLD: 7.55 K/UL — SIGNIFICANT CHANGE UP (ref 3.8–10.5)
WBC # FLD AUTO: 7.55 K/UL — SIGNIFICANT CHANGE UP (ref 3.8–10.5)

## 2019-09-22 RX ORDER — CHLORHEXIDINE GLUCONATE 213 G/1000ML
30 SOLUTION TOPICAL ONCE
Refills: 0 | Status: DISCONTINUED | OUTPATIENT
Start: 2019-09-22 | End: 2019-09-23

## 2019-09-22 RX ORDER — CEFUROXIME AXETIL 250 MG
1500 TABLET ORAL ONCE
Refills: 0 | Status: DISCONTINUED | OUTPATIENT
Start: 2019-09-22 | End: 2019-09-23

## 2019-09-22 RX ORDER — CHLORHEXIDINE GLUCONATE 213 G/1000ML
1 SOLUTION TOPICAL ONCE
Refills: 0 | Status: COMPLETED | OUTPATIENT
Start: 2019-09-22 | End: 2019-09-22

## 2019-09-22 RX ADMIN — SODIUM CHLORIDE 3 MILLILITER(S): 9 INJECTION INTRAMUSCULAR; INTRAVENOUS; SUBCUTANEOUS at 15:04

## 2019-09-22 RX ADMIN — HEPARIN SODIUM 1150 UNIT(S)/HR: 5000 INJECTION INTRAVENOUS; SUBCUTANEOUS at 03:04

## 2019-09-22 RX ADMIN — Medication 100 MILLIGRAM(S): at 05:20

## 2019-09-22 RX ADMIN — Medication 2: at 18:41

## 2019-09-22 RX ADMIN — Medication 25 MILLIGRAM(S): at 15:07

## 2019-09-22 RX ADMIN — Medication 2: at 12:27

## 2019-09-22 RX ADMIN — KETOTIFEN FUMARATE 1 DROP(S): 0.34 SOLUTION OPHTHALMIC at 18:42

## 2019-09-22 RX ADMIN — GLYCOPYRROLATE AND FORMOTEROL FUMARATE 2 PUFF(S): 9; 4.8 AEROSOL, METERED RESPIRATORY (INHALATION) at 05:20

## 2019-09-22 RX ADMIN — SEVELAMER CARBONATE 1600 MILLIGRAM(S): 2400 POWDER, FOR SUSPENSION ORAL at 12:25

## 2019-09-22 RX ADMIN — SODIUM CHLORIDE 3 MILLILITER(S): 9 INJECTION INTRAMUSCULAR; INTRAVENOUS; SUBCUTANEOUS at 19:50

## 2019-09-22 RX ADMIN — SODIUM CHLORIDE 3 MILLILITER(S): 9 INJECTION INTRAMUSCULAR; INTRAVENOUS; SUBCUTANEOUS at 05:22

## 2019-09-22 RX ADMIN — SEVELAMER CARBONATE 1600 MILLIGRAM(S): 2400 POWDER, FOR SUSPENSION ORAL at 18:42

## 2019-09-22 RX ADMIN — AMLODIPINE BESYLATE 10 MILLIGRAM(S): 2.5 TABLET ORAL at 05:19

## 2019-09-22 RX ADMIN — CHLORHEXIDINE GLUCONATE 1 APPLICATION(S): 213 SOLUTION TOPICAL at 12:27

## 2019-09-22 RX ADMIN — GLYCOPYRROLATE AND FORMOTEROL FUMARATE 2 PUFF(S): 9; 4.8 AEROSOL, METERED RESPIRATORY (INHALATION) at 18:41

## 2019-09-22 RX ADMIN — TIOTROPIUM BROMIDE 1 CAPSULE(S): 18 CAPSULE ORAL; RESPIRATORY (INHALATION) at 12:25

## 2019-09-22 RX ADMIN — HEPARIN SODIUM 1150 UNIT(S)/HR: 5000 INJECTION INTRAVENOUS; SUBCUTANEOUS at 10:32

## 2019-09-22 RX ADMIN — KETOTIFEN FUMARATE 1 DROP(S): 0.34 SOLUTION OPHTHALMIC at 05:19

## 2019-09-22 RX ADMIN — SEVELAMER CARBONATE 1600 MILLIGRAM(S): 2400 POWDER, FOR SUSPENSION ORAL at 08:06

## 2019-09-22 RX ADMIN — Medication 100 MILLIGRAM(S): at 12:25

## 2019-09-22 RX ADMIN — Medication 81 MILLIGRAM(S): at 12:25

## 2019-09-22 RX ADMIN — PANTOPRAZOLE SODIUM 40 MILLIGRAM(S): 20 TABLET, DELAYED RELEASE ORAL at 05:19

## 2019-09-22 RX ADMIN — Medication 25 MILLIGRAM(S): at 05:19

## 2019-09-22 NOTE — PROGRESS NOTE ADULT - PROBLEM SELECTOR PLAN 1
scheduled for HD today for pre op renal optimization, w/2k bath, UF goal 1.5-2kg, as BP tolerates,  electrolytes, vol- acceptable.    Planned for CABG on Monday/tomorrow  c/w Renal restrictions in diet  dose all meds for ESRD

## 2019-09-22 NOTE — CONSULT NOTE ADULT - ASSESSMENT
Assessment  DMT2: 69y Male with DM T2 with hyperglycemia, was on oral meds at home, now on insulin coverage, blood sugars not at target, started on low dose Lantus, no hypoglycemic episode, eating meals, compliant with low carb diet.  CAD: on medications, no chest pain, stable, monitored.  HTN: Controlled,  on antihypertensive medications.  ESRD: On hemodialysis, Monitor labs/BMP            Yg Mackey MD  Cell: [phone]  Office: [phone] Assessment  DMT2: 69y Male with DM T2 with hyperglycemia, was on oral meds at home, now on insulin coverage, blood sugars not at target, started on low dose Lantus, no hypoglycemic episode, eating meals, compliant with low carb diet.  CAD: on medications, no chest pain, stable, monitored.  HTN: Controlled,  on antihypertensive medications.  Hyperthyroidism: On low dose Methimazole.  ESRD: On hemodialysis, Monitor labs/BMP            Yg Mackey MD  Cell: [phone]  Office: [phone]

## 2019-09-22 NOTE — PROGRESS NOTE ADULT - SUBJECTIVE AND OBJECTIVE BOX
[facility] [place] [facility] - Eugene / Marlena JULIEN /Cruz/ WILY Mackey/ WILY Macias/ Alexandre Esposito / WILMER Njeru  ---------------------------------------------------------------------------------------------------------------    Patient seen and examined bedside    Subjective and Objective: No overnight events, denied cp/ sob. No complaints today.  plan for CABG tomorrow    Allergies: lisinopril (Other)      Hospital Medications:   MEDICATIONS  (STANDING):  amLODIPine   Tablet 10 milliGRAM(s) Oral daily  aspirin  chewable 81 milliGRAM(s) Oral daily  atorvastatin 80 milliGRAM(s) Oral at bedtime  cefuroxime  IVPB 1500 milliGRAM(s) IV Intermittent once  chlorhexidine 0.12% Liquid 30 milliLiter(s) Swish and Spit once  chlorhexidine 0.12% Liquid 15 milliLiter(s) Swish and Spit once  chlorhexidine 2% Cloths 1 Application(s) Topical <User Schedule>  chlorhexidine 4% Liquid 1 Application(s) Topical once  docusate sodium 100 milliGRAM(s) Oral three times a day  glycopyrrolate 9 MICROgram(s)/formoterol 4.8 MICROgram(s)Inhaler 2 Puff(s) Inhalation two times a day  heparin  Infusion. 1100 Unit(s)/Hr (11 mL/Hr) IV Continuous <Continuous>  insulin glargine Injectable (LANTUS) 5 Unit(s) SubCutaneous at bedtime  insulin lispro (HumaLOG) corrective regimen sliding scale   SubCutaneous three times a day before meals  insulin lispro (HumaLOG) corrective regimen sliding scale   SubCutaneous at bedtime  ketotifen 0.025% Ophthalmic Solution 1 Drop(s) Both EYES two times a day  methimazole 5 milliGRAM(s) Oral daily  metoprolol tartrate 25 milliGRAM(s) Oral every 8 hours  pantoprazole    Tablet 40 milliGRAM(s) Oral before breakfast  sevelamer carbonate 1600 milliGRAM(s) Oral three times a day with meals  sodium chloride 0.9% lock flush 3 milliLiter(s) IV Push every 8 hours  tiotropium 18 MICROgram(s) Capsule 1 Capsule(s) Inhalation daily      VITALS:  T(F): 98.2 (19 @ 14:00), Max: 98.5 (19 @ 21:35)  HR: 63 (19 @ 15:07)  BP: 156/74 (19 @ 15:07)  RR: 18 (19 @ 14:00)  SpO2: 98% (19 @ 14:00)  Wt(kg): --     @ 07:01  -   @ 07:00  --------------------------------------------------------  IN: 938 mL / OUT: 0 mL / NET: 938 mL      PHYSICAL EXAM:  Constitutional: NAD  HEENT: anicteric sclera, oropharynx clear  Neck: No JVD  Respiratory: CTAB, no wheezes, rales or rhonchi  Cardiovascular: S1, S2, RRR  Gastrointestinal: BS+, soft, NT/ND  Extremities: No cyanosis or clubbing. No peripheral edema  Neurological: A/O x 3, no focal deficits  Psychiatric: Normal mood, normal affect  :  No wagner.   Vascular Access: WES AVF+thrill    LABS:      133<L>  |  90<L>  |  39<H>  ----------------------------<  401<H>  4.4   |  26  |  5.34<H>    Ca    9.3      22 Sep 2019 09:44    TPro  6.9  /  Alb  3.7  /  TBili  0.4  /  DBili      /  AST  11  /  ALT  13  /  AlkPhos  67      Creatinine Trend: 5.34 <--, 7.49 <--, 5.55 <--, 8.20 <--, 6.59 <--, 8.66 <--, 8.24 <--, 7.80 <--                        11.7   7.55  )-----------( 168      ( 22 Sep 2019 13:49 )             36.8     Urine Studies:  Urinalysis Basic - ( 16 Sep 2019 17:12 )    Color: Light Yellow / Appearance: Clear / S.016 / pH:   Gluc:  / Ketone: Negative  / Bili: Negative / Urobili: Negative   Blood:  / Protein: 100 mg/dL / Nitrite: Negative   Leuk Esterase: Negative / RBC: 1 /hpf / WBC 1 /HPF   Sq Epi:  / Non Sq Epi: 0 /hpf / Bacteria: Negative          RADIOLOGY & ADDITIONAL STUDIES:

## 2019-09-22 NOTE — PROGRESS NOTE ADULT - SUBJECTIVE AND OBJECTIVE BOX
Patient is a 69y old  Male who presents with a chief complaint of chest pain (21 Sep 2019 15:48)      SUBJECTIVE / OVERNIGHT EVENTS: overnight events noted    ROS:  Resp: No cough no sputum production  CVS: No chest pain no palpitations no orthopnea  GI: no N/V/D  : no dysuria, no hematuria  Neuro: no weakness no paresthesias  Heme: No petechiae no easy bruising  Msk: No joint pain no swelling  Skin: No rash no itching        MEDICATIONS  (STANDING):  amLODIPine   Tablet 10 milliGRAM(s) Oral daily  aspirin  chewable 81 milliGRAM(s) Oral daily  atorvastatin 80 milliGRAM(s) Oral at bedtime  cefuroxime  IVPB 1500 milliGRAM(s) IV Intermittent once  chlorhexidine 0.12% Liquid 30 milliLiter(s) Swish and Spit once  chlorhexidine 0.12% Liquid 15 milliLiter(s) Swish and Spit once  chlorhexidine 2% Cloths 1 Application(s) Topical <User Schedule>  chlorhexidine 4% Liquid 1 Application(s) Topical once  docusate sodium 100 milliGRAM(s) Oral three times a day  glycopyrrolate 9 MICROgram(s)/formoterol 4.8 MICROgram(s)Inhaler 2 Puff(s) Inhalation two times a day  heparin  Infusion. 1100 Unit(s)/Hr (11 mL/Hr) IV Continuous <Continuous>  insulin glargine Injectable (LANTUS) 5 Unit(s) SubCutaneous at bedtime  insulin lispro (HumaLOG) corrective regimen sliding scale   SubCutaneous three times a day before meals  insulin lispro (HumaLOG) corrective regimen sliding scale   SubCutaneous at bedtime  ketotifen 0.025% Ophthalmic Solution 1 Drop(s) Both EYES two times a day  methimazole 5 milliGRAM(s) Oral daily  metoprolol tartrate 25 milliGRAM(s) Oral every 8 hours  pantoprazole    Tablet 40 milliGRAM(s) Oral before breakfast  sevelamer carbonate 1600 milliGRAM(s) Oral three times a day with meals  sodium chloride 0.9% lock flush 3 milliLiter(s) IV Push every 8 hours  tiotropium 18 MICROgram(s) Capsule 1 Capsule(s) Inhalation daily    MEDICATIONS  (PRN):  ALBUTerol    90 MICROgram(s) HFA Inhaler 2 Puff(s) Inhalation every 4 hours PRN Wheezing        CAPILLARY BLOOD GLUCOSE      POCT Blood Glucose.: 169 mg/dL (22 Sep 2019 12:08)  POCT Blood Glucose.: 118 mg/dL (22 Sep 2019 08:00)  POCT Blood Glucose.: 124 mg/dL (21 Sep 2019 21:58)  POCT Blood Glucose.: 187 mg/dL (21 Sep 2019 17:43)    I&O's Summary    21 Sep 2019 07:01  -  22 Sep 2019 07:00  --------------------------------------------------------  IN: 938 mL / OUT: 0 mL / NET: 938 mL        Vital Signs Last 24 Hrs  T(C): 36.8 (22 Sep 2019 14:00), Max: 36.9 (21 Sep 2019 21:35)  T(F): 98.2 (22 Sep 2019 14:00), Max: 98.5 (21 Sep 2019 21:35)  HR: 68 (22 Sep 2019 14:00) (66 - 78)  BP: 174/74 (22 Sep 2019 14:00) (149/63 - 186/86)  BP(mean): --  RR: 18 (22 Sep 2019 14:00) (18 - 18)  SpO2: 98% (22 Sep 2019 14:00) (93% - 98%)    PHYSICAL EXAM:  GENERAL: NAD, well-developed  HEAD:  Atraumatic, Normocephalic  EYES: EOMI, PERRLA, conjunctiva and sclera clear  NECK: Supple, No JVD  CHEST/LUNG: Clear to auscultation bilaterally; No wheeze  HEART: S1S2; soft ejection systolic murmur best heard at left sternal border no rub no gallop   ABDOMEN: Soft, Nontender, Nondistended; Bowel sounds present  EXTREMITIES:  + Peripheral Pulses, No clubbing or cyanosis, no edema  PSYCH: AO x 3,   NEUROLOGY: Alert, no focal motor or sensory deficits  SKIN: No rashes or lesions    LABS:                        11.7   7.55  )-----------( 168      ( 22 Sep 2019 13:49 )             36.8     09-22    133<L>  |  90<L>  |  39<H>  ----------------------------<  401<H>  4.4   |  26  |  5.34<H>    Ca    9.3      22 Sep 2019 09:44    TPro  6.9  /  Alb  3.7  /  TBili  0.4  /  DBili  x   /  AST  11  /  ALT  13  /  AlkPhos  67  09-21    PT/INR - ( 20 Sep 2019 21:54 )   PT: 13.0 sec;   INR: 1.13 ratio         PTT - ( 22 Sep 2019 09:44 )  PTT:52.5 sec            All consultant(s) notes reviewed and care discussed with other providers        Contact Number, Dr Cagle [phone]

## 2019-09-22 NOTE — PROGRESS NOTE ADULT - ASSESSMENT
69 y.o. male with pmh of ESRD( HD on tu/Th/sa) at [facility] via LUE AVF. Last dialyzed yesterday 9/14, was sent to [facility] for cath after presenting with SOB and a positive stress test. Transferred to [facility] for evaluation for CABG. Renal following for ESRD Mx.    labs, chart reviewed

## 2019-09-22 NOTE — PROGRESS NOTE ADULT - SUBJECTIVE AND OBJECTIVE BOX
Cardiac Surgery Pre-op Note:    Referring Physician:                                                                                             Surgeon: Christina  Procedure: () (cabg)    Allergies:     lisinopril (Other)    Intolerances      HPI:  69 y.o. male with pmh of ESRD( HD on ) Last dialyzed yesterday 9/15, was sent to [facility] for cath after presenting with SOB and a positive stress test. Presented today for elective cardiac catheterization. The patient c/o SOB with exertion (walking <1 block) started 2 months ago. The patient denies chest pain,  palpitations, dizziness, presyncope, syncope, b/l lower  extremities edema, abdominal pain, N/V/D/C, melena, hematochezia, h/o DVT, PE, AMY, fever, chills, urinary symptoms, hematuria.  The patient was evaluated by his cardiologist, was noted to have abnormal stress test (19) - EF 50%, fixed proximal and mid inferior and inferolateral wall perfusion defect c/w MI and mild to moderate jose martin-infarct ischemia.  Echo- 2019 - normal LV size and function, mild diastolic dysfunction, mild TR.  Due to patient's symptoms and abnormal non invasive tests, the patient was recommended to have cardiac catheterization. The patient denies any complaints at present.   Cardiac cath performed and revealed Ostial Left main stenosis of 90%, Mid Cx in stent 50% and RCA in stent 50%; Patent stent in LAD. Patient was transferred to [facility] for CT surgery eval and possible CABG with Dr. Herzog. (16 Sep 2019 16:43)      PAST MEDICAL & SURGICAL HISTORY:  ESRD (end stage renal disease) on dialysis: -Sat  Stented coronary artery: total 15 stents from [id number]  Hyperthyroidism  H/O: CVA: after cardiac stent placed 12/15/09 -no residual  Heart Attack: 07 with subsequent stent  Coronary Stent: [id number] 10 stents,  to Ramus 2015, cath 2016 ( see results in HPI)  Hypercholesterolemia  CAD (Coronary Artery Disease)  DM (Diabetes Mellitus): x 4 yrs without N/N/R  HTN (Hypertension)  Hemodialysis access, AV graft: 2015, L arm  S/P cataract extraction  Boil of Buttock:  and       MEDICATIONS  (STANDING):  amLODIPine   Tablet 10 milliGRAM(s) Oral daily  aspirin  chewable 81 milliGRAM(s) Oral daily  atorvastatin 80 milliGRAM(s) Oral at bedtime  chlorhexidine 0.12% Liquid 15 milliLiter(s) Swish and Spit once  chlorhexidine 2% Cloths 1 Application(s) Topical <User Schedule>  docusate sodium 100 milliGRAM(s) Oral three times a day  glycopyrrolate 9 MICROgram(s)/formoterol 4.8 MICROgram(s)Inhaler 2 Puff(s) Inhalation two times a day  heparin  Infusion. 1100 Unit(s)/Hr (11 mL/Hr) IV Continuous <Continuous>  insulin glargine Injectable (LANTUS) 5 Unit(s) SubCutaneous at bedtime  insulin lispro (HumaLOG) corrective regimen sliding scale   SubCutaneous three times a day before meals  insulin lispro (HumaLOG) corrective regimen sliding scale   SubCutaneous at bedtime  ketotifen 0.025% Ophthalmic Solution 1 Drop(s) Both EYES two times a day  methimazole 5 milliGRAM(s) Oral daily  metoprolol tartrate 25 milliGRAM(s) Oral every 8 hours  pantoprazole    Tablet 40 milliGRAM(s) Oral before breakfast  sevelamer carbonate 1600 milliGRAM(s) Oral three times a day with meals  sodium chloride 0.9% lock flush 3 milliLiter(s) IV Push every 8 hours  tiotropium 18 MICROgram(s) Capsule 1 Capsule(s) Inhalation daily    MEDICATIONS  (PRN):  ALBUTerol    90 MICROgram(s) HFA Inhaler 2 Puff(s) Inhalation every 4 hours PRN Wheezing      Labs:                        11.9   7.2   )-----------( 142      ( 21 Sep 2019 06:54 )             34.1         132<L>  |  91<L>  |  57<H>  ----------------------------<  133<H>  4.6   |  24  |  7.49<H>    Ca    9.5      21 Sep 2019 06:54    TPro  6.9  /  Alb  3.7  /  TBili  0.4  /  DBili  x   /  AST  11  /  ALT  13  /  AlkPhos  67  09-21    PT/INR - ( 20 Sep 2019 21:54 )   PT: 13.0 sec;   INR: 1.13 ratio         PTT - ( 22 Sep 2019 09:44 )  PTT:52.5 sec    Blood Type: ABO Interpretation: A ( @ 07:01)    HGB A1C:   Prealbumin:   Pro-BNP:   Thyroid Panel:  @ 22:22/0.36  --/--/--    MRSA:  / MSSA:  not detected      CXR: clear lungs    EKG: < from: 12 Lead ECG (19 @ 11:42) >    Ventricular Rate 60 BPM    Atrial Rate 60 BPM    P-R Interval 210 ms    QRS Duration 144 ms    Q-T Interval 482 ms    QTC Calculation(Bezet) 482 ms    P Axis 39 degrees    R Axis 115 degrees    T Axis -18 degrees    Diagnosis Line SINUS RHYTHM [id number] DEGREE A-V BLOCK  RIGHT BUNDLE BRANCH BLOCK  NON SPECIFIC REPOLARIZATION ABNORMALITIES  ABNORMAL ECG    < end of copied text >      Carotid Duplex:  < from: VA Duplex Mora Clay (19 @ 10:58) >  EXAM:  CAROTID DUPLEX BILATERAL                            PROCEDURE DATE:  2019            INTERPRETATION:  Technique: Grayscale, color and spectral Doppler   examination of both carotid arteries was performed.     HISTORY:  Preop CABG    A prior examination, dated 2016, showed mild to moderate   atheromatous plaque in the regions of the bifurcations bilaterally, but   no hemodynamically significant carotid artery stenoses.    On this examination, atheromatous intimal thickening and irregularity is   present along the course of both common carotid arteries with prominent   plaque and turbulent flow through the right internal carotid artery.    Blood flow velocities are as follows:    RIGHT:    PROX CCA = 95 ;  DIST CCA = 82 ;  PROX ICA = 190 ;  DIST ICA =   52 ;  ECA = 180    LEFT   :    PROX CCA = 98 ;  DIST CCA = 96 ;  PROX ICA = 88 ;  DIST ICA =   61 ;  ECA = 142    There is antegrade flow through both vertebral arteries.    IMPRESSION: There is a moderate, 50-69% stenosis of the right internal   carotid artery.  There is a flow-limiting stenosis of the right external carotid artery.  No hemodynamically significant carotid artery stenosis is present on the   left.    < end of copied text >      PFT's:    Echocardiogram: < from: Transthoracic Echocardiogram (19 @ 17:20) >  Dimensions:    Normal Values:  LA:     3.4    2.0 - 4.0 cm  Ao:     3.3    2.0 - 3.8 cm  SEPTUM: 1.5    0.6 - 1.2 cm  PWT:    1.5    0.6 - 1.1 cm  LVIDd:  4.9    3.0 - 5.6 cm  LVIDs:  2.9    1.8 - 4.0 cm  Derived variables:  LVMI: 169 g/m2  RWT: 0.64  Fractional short: 41 %  Doppler Peak Velocity (m/sec): MV=1.4 AoV=1.4  ------------------------------------------------------------------------  Observations:  Mitral Valve: Mitral annular calcification. Mild mitral  regurgitation.  Peak mitral valve gradient equals 8 mm Hg,  mean transmitral valve gradient equals 3 mm Hg, consistent  with mild mitral stenosis. Gradients measured at a HR of  61bpm.  Aortic Valve/Aorta: Calcified trileaflet aortic valve with  normal opening. No aortic valve regurgitation seen. Peak  left ventricular outflow tract gradient equals 3 mm Hg.  Aortic Root: 3.3 cm.  Left Atrium: Mildly dilated left atrium.  LA volume index =  35 cc/m2.  Left Ventricle: Endocardial visualization enhanced with  intravenous injection of Ultrasonic Enhancing Agent  (Definity). No left ventricular thrombus. Mild segmental  left ventricular systolic dysfunction. The basal inferior  wall, the basal anterolateral wall, and the mid inferior  wall are hypokinetic.  Moderate concentric left ventricular  hypertrophy. Moderate diastolic dysfunction (Stage II).  Right Heart: Normal right atrium. Normal right ventricular  size and function. Normal tricuspid valve. Mild tricuspid  regurgitation. Normal pulmonic valve. Minimal pulmonic  regurgitation.  Pericardium/Pleura: Normal pericardium with no pericardial  effusion.  Hemodynamic: Estimated right atrial pressure is 8 mm Hg.  Estimated right ventricular systolic pressure equals 49 mm  Hg, assuming right atrial pressure equals 8 mm Hg,  consistent with mild pulmonary hypertension.  ------------------------------------------------------------------------  Conclusions:  1. Mitral annular calcification. Mild mitral regurgitation.   Peak mitral valve gradient equals 8 mm Hg, mean  transmitral valve gradient equals 3 mm Hg, consistent with  mild mitral stenosis. Gradients measured at a HR of 61bpm.  2. Calcified trileaflet aortic valve with normal opening.  No aortic valve regurgitation seen.  3. Moderate concentric left ventricular hypertrophy.  4. Endocardial visualization enhanced with intravenous  injection of Ultrasonic Enhancing Agent (Definity). No left  ventricular thrombus. Mild segmental left ventricular  systolic dysfunction. The basal inferior wall, the basal  anterolateral wall, and the mid inferior wall are  hypokinetic.  5. Moderate diastolic dysfunction (Stage II).  6. Normalright ventricular size and function.  7. Normal pericardium with no pericardial effusion.  *** No previous Echo exam.    < end of copied text >      Cardiac catheterization:     Gen: WN/WD NAD  Neuro: AAOx3, nonfocal  Pulm: CTA B/L  CV: RRR, S1S2 +systolic murmur  Abd: Soft, NT, ND +BS  Ext: No edema, + peripheral pulses  < from: Cardiac Cath Lab - Adult (19 @ 12:28) >  [place] [phone]  Cath Lab Report -- Comprehensive Report  Patient: VIOLETTA RENTERIA  Study date: 2019  Account number: [id number]  MR number: [id number]  : 1950  Gender: Male  Race: A  Case Physician(s):  Jessy Méndez M.D.  Referring Physician:  INDICATIONS: Unstable angina - CCS3. Abnormal stress test.  HISTORY: The patient has a history of coronary artery disease. The patient  has hypertension, dialysis-treated renal failure, and medication-treated  dyslipidemia. PRIOR CARDIOVASCULAR PROCEDURES: Stent of the proximal ramus  intermedius. Stent of the mid RCA.  PROCEDURE:  --  Left heart catheterization.  --  Left coronary angiography.  --  Right coronary angiography.  --  Sonosite - Diagnostic.  TECHNIQUE: The risks and alternatives of the procedures and conscious  sedation were explained to the patient and informed consent was obtained.  Cardiac catheterization performed electively.  Local anesthetic given. Right femoral artery access. Left heart  catheterization. Left coronary artery angiography. The vessel was injected  utilizing a catheter. Right coronary artery angiography. The vessel was  injected utilizing a catheter. Sonosite - Diagnostic. RADIATION EXPOSURE:  3.9 min.  CONTRAST GIVEN: 20 ml Optiray.  MEDICATIONS GIVEN: 2% Lidocaine, 10 ml, subcutaneously. 0.9% Normal saline,  8 ml, IV.  VENTRICLES: No LV gram was performed; however, a recent echocardiogram  demonstrated normal global and regional LV function.  CORONARY VESSELS: The coronary circulation is right dominant.  LM:   --  Ostial LM: There was a discrete 90 % stenosis.  LAD:   --  Mid LAD: Angiography showed minor luminal irregularities with no  flow limiting lesions. The stent in the mid LAD is patent.  --  Distal LAD: The vessel was small sized. Angiography showed moderate  atherosclerosis.  CX:   --  Mid circumflex: There was a tubular 50 % stenosis at the site of  a prior stent.  RI:   --  Proximal ramus intermedius: There was a diffuse 50 % stenosis at  the site of a prior stent.  RCA:   --  Mid RCA: There was a tubular 50 % stenosis at the site of a  prior stent.  --  RPLS: Angiography showed minor luminal irregularities with no flow  limiting lesions.  COMPLICATIONS: There were no complications.  DIAGNOSTIC RECOMMENDATIONS: Consultation with a cardiac surgeon be obtained  for surgical opinion and coronary artery bypass grafting.  Prepared and signed by  Jessy Méndez M.D.  Signed 2019 13:42:23    < end of copied text >      Pt has AICD/PPM [ ] Yes  [x ] No             Brand Name:  Pre-op Beta Blocker ordered within 24 hrs of surgery (CABG ONLY)?  [x ] Yes  [ ] No  If not, Why?  Type & Cross  [x ] Yes  [ ] No  NPO after Midnight [x ] Yes  [ ] No  Pre-op ABX ordered, to be taped on chart:  [ x] Yes  [ ] No     Hibiclens/Peridex ordered [x ] Yes  [ ] No  Intraop on Hold: PRBCs, CXR, TRUMAN [x ]   Consent obtained  [x ] Yes  [ ] No

## 2019-09-22 NOTE — CONSULT NOTE ADULT - SUBJECTIVE AND OBJECTIVE BOX
Chief complaint  Patient is a 69y old  Male who presents with a chief complaint of chest pain (22 Sep 2019 14:57)   Review of systems  Patient in bed, looks comfortable, no fever, no hypoglycemia.    Labs and Fingersticks  CAPILLARY BLOOD GLUCOSE      POCT Blood Glucose.: 169 mg/dL (22 Sep 2019 12:08)  POCT Blood Glucose.: 118 mg/dL (22 Sep 2019 08:00)  POCT Blood Glucose.: 124 mg/dL (21 Sep 2019 21:58)  POCT Blood Glucose.: 187 mg/dL (21 Sep 2019 17:43)      Anion Gap, Serum: 17 (09-22 @ 09:44)  Anion Gap, Serum: 17 (09-21 @ 06:54)      Calcium, Total Serum: 9.3 (09-22 @ 09:44)  Calcium, Total Serum: 9.5 (09-21 @ 06:54)  Albumin, Serum: 3.7 (09-21 @ 06:54)    Alanine Aminotransferase (ALT/SGPT): 13 (09-21 @ 06:54)  Alkaline Phosphatase, Serum: 67 (09-21 @ 06:54)  Aspartate Aminotransferase (AST/SGOT): 11 (09-21 @ 06:54)        09-22    133<L>  |  90<L>  |  39<H>  ----------------------------<  401<H>  4.4   |  26  |  5.34<H>    Ca    9.3      22 Sep 2019 09:44    TPro  6.9  /  Alb  3.7  /  TBili  0.4  /  DBili  x   /  AST  11  /  ALT  13  /  AlkPhos  67  09-21                        11.7   7.55  )-----------( 168      ( 22 Sep 2019 13:49 )             36.8     Medications  MEDICATIONS  (STANDING):  amLODIPine   Tablet 10 milliGRAM(s) Oral daily  aspirin  chewable 81 milliGRAM(s) Oral daily  atorvastatin 80 milliGRAM(s) Oral at bedtime  cefuroxime  IVPB 1500 milliGRAM(s) IV Intermittent once  chlorhexidine 0.12% Liquid 30 milliLiter(s) Swish and Spit once  chlorhexidine 0.12% Liquid 15 milliLiter(s) Swish and Spit once  chlorhexidine 2% Cloths 1 Application(s) Topical <User Schedule>  chlorhexidine 4% Liquid 1 Application(s) Topical once  docusate sodium 100 milliGRAM(s) Oral three times a day  glycopyrrolate 9 MICROgram(s)/formoterol 4.8 MICROgram(s)Inhaler 2 Puff(s) Inhalation two times a day  heparin  Infusion. 1100 Unit(s)/Hr (11 mL/Hr) IV Continuous <Continuous>  insulin glargine Injectable (LANTUS) 5 Unit(s) SubCutaneous at bedtime  insulin lispro (HumaLOG) corrective regimen sliding scale   SubCutaneous three times a day before meals  insulin lispro (HumaLOG) corrective regimen sliding scale   SubCutaneous at bedtime  ketotifen 0.025% Ophthalmic Solution 1 Drop(s) Both EYES two times a day  methimazole 5 milliGRAM(s) Oral daily  metoprolol tartrate 25 milliGRAM(s) Oral every 8 hours  pantoprazole    Tablet 40 milliGRAM(s) Oral before breakfast  sevelamer carbonate 1600 milliGRAM(s) Oral three times a day with meals  sodium chloride 0.9% lock flush 3 milliLiter(s) IV Push every 8 hours  tiotropium 18 MICROgram(s) Capsule 1 Capsule(s) Inhalation daily      Physical Exam  General: Patient comfortable in bed  Vital Signs Last 12 Hrs  T(F): 98.2 (09-22-19 @ 14:00), Max: 98.3 (09-22-19 @ 05:19)  HR: 63 (09-22-19 @ 15:07) (63 - 68)  BP: 156/74 (09-22-19 @ 15:07) (149/63 - 174/74)  BP(mean): --  RR: 18 (09-22-19 @ 14:00) (18 - 18)  SpO2: 98% (09-22-19 @ 14:00) (93% - 98%)  Neck: No palpable thyroid nodules.  CVS: S1S2, No murmurs  Respiratory: No wheezing, no crepitations  GI: Abdomen soft, bowel sounds positive  Musculoskeletal:  edema lower extremities.   Skin: No skin rashes, no ecchymosis    Diagnostics

## 2019-09-22 NOTE — PROGRESS NOTE ADULT - ASSESSMENT
69M ESRD on HD(Tu/Th/Sa), HTN, CAD s/p PCI with 15 stents placed, s/p cardiac cath today which showed LM disease with 90%.  Pre op for CABG 9/23.  Heparin gtt  9/19  HD stable.    Cont hep gtt.  OR Monday 9/20 Hep gtt Painfree  9/21: Denies any chest discomfort.  9/22: Denies chest discomfort. Spoke with Dr. South who will speak to the on call nephrologist regarding Dr. Vasquez's request for HD today. Pt to continue heparin gtt until 3am on 9/23 then off.

## 2019-09-22 NOTE — PROGRESS NOTE ADULT - SUBJECTIVE AND OBJECTIVE BOX
pt seen and examined, no complaints, ROS - .   Tele stable, NSR          ALBUTerol    90 MICROgram(s) HFA Inhaler 2 Puff(s) Inhalation every 4 hours PRN  amLODIPine   Tablet 10 milliGRAM(s) Oral daily  aspirin  chewable 81 milliGRAM(s) Oral daily  atorvastatin 80 milliGRAM(s) Oral at bedtime  chlorhexidine 0.12% Liquid 15 milliLiter(s) Swish and Spit once  chlorhexidine 2% Cloths 1 Application(s) Topical <User Schedule>  docusate sodium 100 milliGRAM(s) Oral three times a day  glycopyrrolate 9 MICROgram(s)/formoterol 4.8 MICROgram(s)Inhaler 2 Puff(s) Inhalation two times a day  heparin  Infusion. 1100 Unit(s)/Hr IV Continuous <Continuous>  insulin glargine Injectable (LANTUS) 5 Unit(s) SubCutaneous at bedtime  insulin lispro (HumaLOG) corrective regimen sliding scale   SubCutaneous three times a day before meals  insulin lispro (HumaLOG) corrective regimen sliding scale   SubCutaneous at bedtime  ketotifen 0.025% Ophthalmic Solution 1 Drop(s) Both EYES two times a day  methimazole 5 milliGRAM(s) Oral daily  metoprolol tartrate 25 milliGRAM(s) Oral every 8 hours  pantoprazole    Tablet 40 milliGRAM(s) Oral before breakfast  sevelamer carbonate 1600 milliGRAM(s) Oral three times a day with meals  sodium chloride 0.9% lock flush 3 milliLiter(s) IV Push every 8 hours  tiotropium 18 MICROgram(s) Capsule 1 Capsule(s) Inhalation daily                            11.9   7.2   )-----------( 142      ( 21 Sep 2019 06:54 )             34.1       Hemoglobin: 11.9 g/dL ( @ 06:54)  Hemoglobin: 12.8 g/dL ( @ 05:39)  Hemoglobin: 11.6 g/dL ( @ 03:59)  Hemoglobin: 12.6 g/dL ( @ 05:08)          132<L>  |  91<L>  |  57<H>  ----------------------------<  133<H>  4.6   |  24  |  7.49<H>    Ca    9.5      21 Sep 2019 06:54    TPro  6.9  /  Alb  3.7  /  TBili  0.4  /  DBili  x   /  AST  11  /  ALT  13  /  AlkPhos  67      Creatinine Trend: 7.49<--, 5.55<--, 8.20<--, 6.59<--, 8.66<--, 8.24<--    COAGS: PTT - ( 22 Sep 2019 01:59 )  PTT:53.8 sec          T(C): 36.8 (19 @ 01:07), Max: 37 (19 @ 13:31)  HR: 78 (19 @ 01:07) (61 - 78)  BP: 170/70 (19 @ 01:07) (150/68 - 186/86)  RR: 18 (19 @ 01:07) (17 - 18)  SpO2: 97% (19 @ 01:07) (90% - 97%)  Wt(kg): --    I&O's Summary    20 Sep 2019 07:01  -  21 Sep 2019 07:00  --------------------------------------------------------  IN: 828 mL / OUT: 0 mL / NET: 828 mL    21 Sep 2019 07:  -  22 Sep 2019 04:08  --------------------------------------------------------  IN: 800 mL / OUT: 0 mL / NET: 800 mL      Gen: Appears well in NAD  HEENT:  (-)icterus (-)pallor  CV: N S1 S2 1/6 DEJAH (+)2 Pulses B/l  Resp:  Clear to auscultation B/L, normal effort  GI: (+) BS Soft, NT, ND  Lymph:  (-)Edema, (-)obvious lymphadenopathy  Skin: Warm to touch, Normal turgor  Psych: Appropriate mood and affect    TELEMETRY: NSR 70-80      < from: Cardiac Cath Lab - Adult (19 @ 12:28) >  PROCEDURE:  --  Left heart catheterization.  --  Left coronary angiography.  --  Right coronary angiography.  --  Sonosite - Diagnostic.  VENTRICLES: No LV gram was performed; however, a recent echocardiogram  demonstrated normal global and regional LV function.  CORONARY VESSELS: The coronary circulation is right dominant.  LM:   --  Ostial LM: There was a discrete 90 % stenosis.  LAD:   --  Mid LAD: Angiography showed minor luminal irregularities with no  flow limiting lesions. The stent in the mid LAD is patent.  --  Distal LAD: The vessel was small sized. Angiography showed moderate  atherosclerosis.  CX:   --  Mid circumflex: There was a tubular 50 % stenosis at the site of  a prior stent.  RI:   --  Proximal ramus intermedius: There was a diffuse 50 % stenosis at  the site of a prior stent.  RCA:   --  Mid RCA: There was a tubular 50 % stenosis at the site of a  prior stent.  --  RPLS: Angiography showed minor luminal irregularities with no flow  limiting lesions.  COMPLICATIONS: There were no complications.  DIAGNOSTIC RECOMMENDATIONS: Consultation with a cardiac surgeon be obtained  for surgical opinion and coronary artery bypass grafting.  Prepared and signed by  Jessy Méndez M.D.  Signed 2019 13:42:23  HEMODYNAMIC TABLES  Pressures:  NO PHASE  Pressures:  - HR: 69  Pressures:  - Rhythm:  Pressures:  -- Aortic Pressure (S/D/M): 170/70/113  Pressures:  -- Left Ventricle (s/edp): 164/28/--  Outputs:  NO PHASE  Outputs:  -- CALCULATIONS[age]  Outputs:  -- CALCULATIONS: Body Surface Area: 1.92  Outputs:  -- CALCULATIONS: Height in cm: 168.00  Outputs:  -- CALCULATIONS: Sex: Male  Outputs:  -- CALCULATIONS: Weight in k.60  Outputs:  -- OUTPUTS: O2 consumption: 239.38  Outputs:  -- OUTPUTS: Vo2 Indexed: 125.00  < end of copied text >    ASSESSMENT/PLAN: 68 y/o male with PMHx of CAD, HTN, HLD, T2DM, and ESRD on HD presented after abnormal stress test to [facility] for elective cardiac catheterization. Patient admitted post cath after cath revealed severe ostial LM disease. Now transferred to [facility] for CABG.  	  - Cath revealed severe ostial LM disease  - Tele remains stable in NSR  - Continue ASA/Statin/BB  - HD per renal  - Continue hep gtt  - CABG tomorrow

## 2019-09-23 ENCOUNTER — APPOINTMENT (OUTPATIENT)
Dept: CARDIOTHORACIC SURGERY | Facility: HOSPITAL | Age: 69
End: 2019-09-23

## 2019-09-23 PROBLEM — Z95.5 PRESENCE OF CORONARY ANGIOPLASTY IMPLANT AND GRAFT: Chronic | Status: ACTIVE | Noted: 2019-09-16

## 2019-09-23 LAB
ALBUMIN SERPL ELPH-MCNC: 2.8 G/DL — LOW (ref 3.3–5)
ALP SERPL-CCNC: 42 U/L — SIGNIFICANT CHANGE UP (ref 40–120)
ALT FLD-CCNC: 23 U/L — SIGNIFICANT CHANGE UP (ref 10–45)
ANION GAP SERPL CALC-SCNC: 11 MMOL/L — SIGNIFICANT CHANGE UP (ref 5–17)
APTT BLD: 29.1 SEC — SIGNIFICANT CHANGE UP (ref 27.5–36.3)
APTT BLD: 30.3 SEC — SIGNIFICANT CHANGE UP (ref 27.5–36.3)
AST SERPL-CCNC: 48 U/L — HIGH (ref 10–40)
BASE EXCESS BLDV CALC-SCNC: 0.9 MMOL/L — SIGNIFICANT CHANGE UP (ref -2–2)
BASE EXCESS BLDV CALC-SCNC: 4.3 MMOL/L — HIGH (ref -2–2)
BASE EXCESS BLDV CALC-SCNC: 5.2 MMOL/L — HIGH (ref -2–2)
BASE EXCESS BLDV CALC-SCNC: 5.7 MMOL/L — HIGH (ref -2–2)
BASOPHILS # BLD AUTO: 0 K/UL — SIGNIFICANT CHANGE UP (ref 0–0.2)
BASOPHILS NFR BLD AUTO: 0.1 % — SIGNIFICANT CHANGE UP (ref 0–2)
BILIRUB SERPL-MCNC: 0.7 MG/DL — SIGNIFICANT CHANGE UP (ref 0.2–1.2)
BLOOD GAS VENOUS - CREATININE: SIGNIFICANT CHANGE UP MG/DL (ref 0.5–1.3)
BUN SERPL-MCNC: 31 MG/DL — HIGH (ref 7–23)
CA-I SERPL-SCNC: 0.93 MMOL/L — LOW (ref 1.12–1.3)
CA-I SERPL-SCNC: 1.03 MMOL/L — LOW (ref 1.12–1.3)
CA-I SERPL-SCNC: 1.04 MMOL/L — LOW (ref 1.12–1.3)
CA-I SERPL-SCNC: 1.05 MMOL/L — LOW (ref 1.12–1.3)
CALCIUM SERPL-MCNC: 8.5 MG/DL — SIGNIFICANT CHANGE UP (ref 8.4–10.5)
CHLORIDE BLDV-SCNC: SIGNIFICANT CHANGE UP MMOL/L (ref 96–108)
CHLORIDE SERPL-SCNC: 98 MMOL/L — SIGNIFICANT CHANGE UP (ref 96–108)
CK MB BLD-MCNC: 16 % — HIGH (ref 0–3.5)
CK MB CFR SERPL CALC: 83.7 NG/ML — HIGH (ref 0–6.7)
CK SERPL-CCNC: 524 U/L — HIGH (ref 30–200)
CO2 SERPL-SCNC: 24 MMOL/L — SIGNIFICANT CHANGE UP (ref 22–31)
CREAT SERPL-MCNC: 4.38 MG/DL — HIGH (ref 0.5–1.3)
EOSINOPHIL # BLD AUTO: 0.2 K/UL — SIGNIFICANT CHANGE UP (ref 0–0.5)
EOSINOPHIL NFR BLD AUTO: 1 % — SIGNIFICANT CHANGE UP (ref 0–6)
FIBRINOGEN PPP-MCNC: 436 MG/DL — SIGNIFICANT CHANGE UP (ref 350–510)
GAS PNL BLDA: SIGNIFICANT CHANGE UP
GAS PNL BLDV: 135 MMOL/L — SIGNIFICANT CHANGE UP (ref 135–145)
GAS PNL BLDV: 136 MMOL/L — SIGNIFICANT CHANGE UP (ref 135–145)
GAS PNL BLDV: SIGNIFICANT CHANGE UP
GLUCOSE BLDC GLUCOMTR-MCNC: 126 MG/DL — HIGH (ref 70–99)
GLUCOSE BLDC GLUCOMTR-MCNC: 130 MG/DL — HIGH (ref 70–99)
GLUCOSE BLDC GLUCOMTR-MCNC: 133 MG/DL — HIGH (ref 70–99)
GLUCOSE BLDC GLUCOMTR-MCNC: 136 MG/DL — HIGH (ref 70–99)
GLUCOSE BLDV-MCNC: 112 MG/DL — HIGH (ref 70–99)
GLUCOSE BLDV-MCNC: 121 MG/DL — HIGH (ref 70–99)
GLUCOSE BLDV-MCNC: 150 MG/DL — HIGH (ref 70–99)
GLUCOSE BLDV-MCNC: 97 MG/DL — SIGNIFICANT CHANGE UP (ref 70–99)
GLUCOSE SERPL-MCNC: 160 MG/DL — HIGH (ref 70–99)
HCO3 BLDV-SCNC: 26 MMOL/L — SIGNIFICANT CHANGE UP (ref 21–29)
HCO3 BLDV-SCNC: 30 MMOL/L — HIGH (ref 21–29)
HCT VFR BLD CALC: 35.8 % — LOW (ref 39–50)
HCT VFR BLD CALC: 38.4 % — LOW (ref 39–50)
HCT VFR BLDA CALC: 26 % — LOW (ref 39–50)
HCT VFR BLDA CALC: 27 % — LOW (ref 39–50)
HCT VFR BLDA CALC: 28 % — LOW (ref 39–50)
HCT VFR BLDA CALC: 28 % — LOW (ref 39–50)
HGB BLD CALC-MCNC: 8.2 G/DL — LOW (ref 13–17)
HGB BLD CALC-MCNC: 8.7 G/DL — LOW (ref 13–17)
HGB BLD CALC-MCNC: 9 G/DL — LOW (ref 13–17)
HGB BLD CALC-MCNC: 9 G/DL — LOW (ref 13–17)
HGB BLD-MCNC: 11.8 G/DL — LOW (ref 13–17)
HGB BLD-MCNC: 12.1 G/DL — LOW (ref 13–17)
HOROWITZ INDEX BLDV+IHG-RTO: 0 — SIGNIFICANT CHANGE UP
INR BLD: 1.41 RATIO — HIGH (ref 0.88–1.16)
LACTATE BLDV-MCNC: 0.8 MMOL/L — SIGNIFICANT CHANGE UP (ref 0.7–2)
LACTATE BLDV-MCNC: 0.9 MMOL/L — SIGNIFICANT CHANGE UP (ref 0.7–2)
LACTATE BLDV-MCNC: 1 MMOL/L — SIGNIFICANT CHANGE UP (ref 0.7–2)
LACTATE BLDV-MCNC: 1.1 MMOL/L — SIGNIFICANT CHANGE UP (ref 0.7–2)
LYMPHOCYTES # BLD AUTO: 1.8 K/UL — SIGNIFICANT CHANGE UP (ref 1–3.3)
LYMPHOCYTES # BLD AUTO: 8.8 % — LOW (ref 13–44)
MAGNESIUM SERPL-MCNC: 1.8 MG/DL — SIGNIFICANT CHANGE UP (ref 1.6–2.6)
MCHC RBC-ENTMCNC: 28.9 PG — SIGNIFICANT CHANGE UP (ref 27–34)
MCHC RBC-ENTMCNC: 29.3 PG — SIGNIFICANT CHANGE UP (ref 27–34)
MCHC RBC-ENTMCNC: 31.5 GM/DL — LOW (ref 32–36)
MCHC RBC-ENTMCNC: 32.8 GM/DL — SIGNIFICANT CHANGE UP (ref 32–36)
MCV RBC AUTO: 89.2 FL — SIGNIFICANT CHANGE UP (ref 80–100)
MCV RBC AUTO: 91.9 FL — SIGNIFICANT CHANGE UP (ref 80–100)
MONOCYTES # BLD AUTO: 1.2 K/UL — HIGH (ref 0–0.9)
MONOCYTES NFR BLD AUTO: 5.9 % — SIGNIFICANT CHANGE UP (ref 2–14)
NEUTROPHILS # BLD AUTO: 17.6 K/UL — HIGH (ref 1.8–7.4)
NEUTROPHILS NFR BLD AUTO: 84.2 % — HIGH (ref 43–77)
PCO2 BLDV: 45 MMHG — SIGNIFICANT CHANGE UP (ref 35–50)
PCO2 BLDV: 45 MMHG — SIGNIFICANT CHANGE UP (ref 35–50)
PCO2 BLDV: 46 MMHG — SIGNIFICANT CHANGE UP (ref 35–50)
PCO2 BLDV: 54 MMHG — HIGH (ref 35–50)
PH BLDV: 7.36 — SIGNIFICANT CHANGE UP (ref 7.35–7.45)
PH BLDV: 7.37 — SIGNIFICANT CHANGE UP (ref 7.35–7.45)
PH BLDV: 7.43 — SIGNIFICANT CHANGE UP (ref 7.35–7.45)
PH BLDV: 7.44 — SIGNIFICANT CHANGE UP (ref 7.35–7.45)
PHOSPHATE SERPL-MCNC: 1.9 MG/DL — LOW (ref 2.5–4.5)
PLATELET # BLD AUTO: 137 K/UL — LOW (ref 150–400)
PLATELET # BLD AUTO: 161 K/UL — SIGNIFICANT CHANGE UP (ref 150–400)
PO2 BLDV: 49 MMHG — HIGH (ref 25–45)
PO2 BLDV: 52 MMHG — HIGH (ref 25–45)
PO2 BLDV: 64 MMHG — HIGH (ref 25–45)
PO2 BLDV: 67 MMHG — HIGH (ref 25–45)
POTASSIUM BLDV-SCNC: 4.5 MMOL/L — SIGNIFICANT CHANGE UP (ref 3.5–5)
POTASSIUM BLDV-SCNC: 4.5 MMOL/L — SIGNIFICANT CHANGE UP (ref 3.5–5)
POTASSIUM BLDV-SCNC: 5.1 MMOL/L — HIGH (ref 3.5–5)
POTASSIUM BLDV-SCNC: 5.2 MMOL/L — HIGH (ref 3.5–5)
POTASSIUM SERPL-MCNC: 4.5 MMOL/L — SIGNIFICANT CHANGE UP (ref 3.5–5.3)
POTASSIUM SERPL-SCNC: 4.5 MMOL/L — SIGNIFICANT CHANGE UP (ref 3.5–5.3)
PROT SERPL-MCNC: 5 G/DL — LOW (ref 6–8.3)
PROTHROM AB SERPL-ACNC: 16.3 SEC — HIGH (ref 10–12.9)
RBC # BLD: 4.02 M/UL — LOW (ref 4.2–5.8)
RBC # BLD: 4.18 M/UL — LOW (ref 4.2–5.8)
RBC # FLD: 11.8 % — SIGNIFICANT CHANGE UP (ref 10.3–14.5)
RBC # FLD: 12.2 % — SIGNIFICANT CHANGE UP (ref 10.3–14.5)
SAO2 % BLDV: 82 % — SIGNIFICANT CHANGE UP (ref 67–88)
SAO2 % BLDV: 84 % — SIGNIFICANT CHANGE UP (ref 67–88)
SAO2 % BLDV: 90 % — HIGH (ref 67–88)
SAO2 % BLDV: 92 % — HIGH (ref 67–88)
SODIUM SERPL-SCNC: 133 MMOL/L — LOW (ref 135–145)
T4 FREE SERPL-MCNC: 1.3 NG/DL — SIGNIFICANT CHANGE UP (ref 0.9–1.8)
TROPONIN T, HIGH SENSITIVITY RESULT: 1020 NG/L — HIGH (ref 0–51)
TSH SERPL-MCNC: 1.48 UIU/ML — SIGNIFICANT CHANGE UP (ref 0.27–4.2)
WBC # BLD: 20.9 K/UL — HIGH (ref 3.8–10.5)
WBC # BLD: 7.96 K/UL — SIGNIFICANT CHANGE UP (ref 3.8–10.5)
WBC # FLD AUTO: 20.9 K/UL — HIGH (ref 3.8–10.5)
WBC # FLD AUTO: 7.96 K/UL — SIGNIFICANT CHANGE UP (ref 3.8–10.5)

## 2019-09-23 PROCEDURE — 33518 CABG ARTERY-VEIN TWO: CPT

## 2019-09-23 PROCEDURE — 33533 CABG ARTERIAL SINGLE: CPT

## 2019-09-23 PROCEDURE — 99291 CRITICAL CARE FIRST HOUR: CPT

## 2019-09-23 PROCEDURE — 33518 CABG ARTERY-VEIN TWO: CPT | Mod: AS

## 2019-09-23 PROCEDURE — 33508 ENDOSCOPIC VEIN HARVEST: CPT | Mod: AS

## 2019-09-23 PROCEDURE — 33508 ENDOSCOPIC VEIN HARVEST: CPT

## 2019-09-23 PROCEDURE — 33533 CABG ARTERIAL SINGLE: CPT | Mod: AS

## 2019-09-23 PROCEDURE — 93010 ELECTROCARDIOGRAM REPORT: CPT

## 2019-09-23 PROCEDURE — 71045 X-RAY EXAM CHEST 1 VIEW: CPT | Mod: 26

## 2019-09-23 RX ORDER — DOBUTAMINE HCL 250MG/20ML
2.5 VIAL (ML) INTRAVENOUS
Qty: 500 | Refills: 0 | Status: DISCONTINUED | OUTPATIENT
Start: 2019-09-23 | End: 2019-09-25

## 2019-09-23 RX ORDER — CHLORHEXIDINE GLUCONATE 213 G/1000ML
15 SOLUTION TOPICAL EVERY 12 HOURS
Refills: 0 | Status: DISCONTINUED | OUTPATIENT
Start: 2019-09-23 | End: 2019-09-24

## 2019-09-23 RX ORDER — VASOPRESSIN 20 [USP'U]/ML
6 INJECTION INTRAVENOUS
Qty: 50 | Refills: 0 | Status: DISCONTINUED | OUTPATIENT
Start: 2019-09-23 | End: 2019-09-23

## 2019-09-23 RX ORDER — OXYCODONE AND ACETAMINOPHEN 5; 325 MG/1; MG/1
1 TABLET ORAL EVERY 6 HOURS
Refills: 0 | Status: DISCONTINUED | OUTPATIENT
Start: 2019-09-23 | End: 2019-09-28

## 2019-09-23 RX ORDER — VASOPRESSIN 20 [USP'U]/ML
0.1 INJECTION INTRAVENOUS
Qty: 50 | Refills: 0 | Status: DISCONTINUED | OUTPATIENT
Start: 2019-09-23 | End: 2019-09-24

## 2019-09-23 RX ORDER — IPRATROPIUM/ALBUTEROL SULFATE 18-103MCG
3 AEROSOL WITH ADAPTER (GRAM) INHALATION EVERY 6 HOURS
Refills: 0 | Status: DISCONTINUED | OUTPATIENT
Start: 2019-09-23 | End: 2019-09-30

## 2019-09-23 RX ORDER — METOCLOPRAMIDE HCL 10 MG
10 TABLET ORAL EVERY 8 HOURS
Refills: 0 | Status: DISCONTINUED | OUTPATIENT
Start: 2019-09-23 | End: 2019-09-23

## 2019-09-23 RX ORDER — SODIUM CHLORIDE 9 MG/ML
1000 INJECTION, SOLUTION INTRAVENOUS
Refills: 0 | Status: DISCONTINUED | OUTPATIENT
Start: 2019-09-23 | End: 2019-09-26

## 2019-09-23 RX ORDER — CHLORHEXIDINE GLUCONATE 213 G/1000ML
1 SOLUTION TOPICAL
Refills: 0 | Status: DISCONTINUED | OUTPATIENT
Start: 2019-09-23 | End: 2019-09-28

## 2019-09-23 RX ORDER — MEPERIDINE HYDROCHLORIDE 50 MG/ML
25 INJECTION INTRAMUSCULAR; INTRAVENOUS; SUBCUTANEOUS ONCE
Refills: 0 | Status: DISCONTINUED | OUTPATIENT
Start: 2019-09-23 | End: 2019-09-24

## 2019-09-23 RX ORDER — DEXTROSE 50 % IN WATER 50 %
50 SYRINGE (ML) INTRAVENOUS
Refills: 0 | Status: DISCONTINUED | OUTPATIENT
Start: 2019-09-23 | End: 2019-09-25

## 2019-09-23 RX ORDER — FAMOTIDINE 10 MG/ML
20 INJECTION INTRAVENOUS EVERY 24 HOURS
Refills: 0 | Status: DISCONTINUED | OUTPATIENT
Start: 2019-09-23 | End: 2019-09-26

## 2019-09-23 RX ORDER — DOCUSATE SODIUM 100 MG
100 CAPSULE ORAL THREE TIMES A DAY
Refills: 0 | Status: DISCONTINUED | OUTPATIENT
Start: 2019-09-23 | End: 2019-10-06

## 2019-09-23 RX ORDER — DOCUSATE SODIUM 100 MG
100 CAPSULE ORAL THREE TIMES A DAY
Refills: 0 | Status: DISCONTINUED | OUTPATIENT
Start: 2019-09-23 | End: 2019-09-23

## 2019-09-23 RX ORDER — SODIUM CHLORIDE 9 MG/ML
1000 INJECTION INTRAMUSCULAR; INTRAVENOUS; SUBCUTANEOUS
Refills: 0 | Status: DISCONTINUED | OUTPATIENT
Start: 2019-09-23 | End: 2019-09-28

## 2019-09-23 RX ORDER — ASPIRIN/CALCIUM CARB/MAGNESIUM 324 MG
300 TABLET ORAL ONCE
Refills: 0 | Status: DISCONTINUED | OUTPATIENT
Start: 2019-09-23 | End: 2019-09-25

## 2019-09-23 RX ORDER — OXYCODONE AND ACETAMINOPHEN 5; 325 MG/1; MG/1
2 TABLET ORAL EVERY 6 HOURS
Refills: 0 | Status: DISCONTINUED | OUTPATIENT
Start: 2019-09-23 | End: 2019-09-28

## 2019-09-23 RX ORDER — DEXTROSE 50 % IN WATER 50 %
25 SYRINGE (ML) INTRAVENOUS
Refills: 0 | Status: DISCONTINUED | OUTPATIENT
Start: 2019-09-23 | End: 2019-09-25

## 2019-09-23 RX ORDER — INSULIN HUMAN 100 [IU]/ML
3 INJECTION, SOLUTION SUBCUTANEOUS
Qty: 100 | Refills: 0 | Status: DISCONTINUED | OUTPATIENT
Start: 2019-09-23 | End: 2019-09-25

## 2019-09-23 RX ORDER — KETOTIFEN FUMARATE 0.34 MG/ML
1 SOLUTION OPHTHALMIC
Refills: 0 | Status: DISCONTINUED | OUTPATIENT
Start: 2019-09-23 | End: 2019-10-06

## 2019-09-23 RX ORDER — HYDROMORPHONE HYDROCHLORIDE 2 MG/ML
0.5 INJECTION INTRAMUSCULAR; INTRAVENOUS; SUBCUTANEOUS ONCE
Refills: 0 | Status: DISCONTINUED | OUTPATIENT
Start: 2019-09-23 | End: 2019-09-23

## 2019-09-23 RX ORDER — DEXMEDETOMIDINE HYDROCHLORIDE IN 0.9% SODIUM CHLORIDE 4 UG/ML
0.2 INJECTION INTRAVENOUS
Qty: 200 | Refills: 0 | Status: DISCONTINUED | OUTPATIENT
Start: 2019-09-23 | End: 2019-09-24

## 2019-09-23 RX ORDER — TIOTROPIUM BROMIDE 18 UG/1
1 CAPSULE ORAL; RESPIRATORY (INHALATION) DAILY
Refills: 0 | Status: DISCONTINUED | OUTPATIENT
Start: 2019-09-23 | End: 2019-10-06

## 2019-09-23 RX ORDER — CEFUROXIME AXETIL 250 MG
1500 TABLET ORAL EVERY 24 HOURS
Refills: 0 | Status: COMPLETED | OUTPATIENT
Start: 2019-09-24 | End: 2019-09-25

## 2019-09-23 RX ORDER — ASPIRIN/CALCIUM CARB/MAGNESIUM 324 MG
81 TABLET ORAL DAILY
Refills: 0 | Status: DISCONTINUED | OUTPATIENT
Start: 2019-09-23 | End: 2019-10-06

## 2019-09-23 RX ORDER — METOCLOPRAMIDE HCL 10 MG
10 TABLET ORAL EVERY 8 HOURS
Refills: 0 | Status: COMPLETED | OUTPATIENT
Start: 2019-09-23 | End: 2019-09-25

## 2019-09-23 RX ORDER — NOREPINEPHRINE BITARTRATE/D5W 8 MG/250ML
0.05 PLASTIC BAG, INJECTION (ML) INTRAVENOUS
Qty: 8 | Refills: 0 | Status: DISCONTINUED | OUTPATIENT
Start: 2019-09-23 | End: 2019-09-24

## 2019-09-23 RX ADMIN — CHLORHEXIDINE GLUCONATE 1 APPLICATION(S): 213 SOLUTION TOPICAL at 00:19

## 2019-09-23 RX ADMIN — HYDROMORPHONE HYDROCHLORIDE 0.5 MILLIGRAM(S): 2 INJECTION INTRAMUSCULAR; INTRAVENOUS; SUBCUTANEOUS at 22:03

## 2019-09-23 RX ADMIN — Medication 25 MILLIGRAM(S): at 00:17

## 2019-09-23 RX ADMIN — SODIUM CHLORIDE 3 MILLILITER(S): 9 INJECTION INTRAMUSCULAR; INTRAVENOUS; SUBCUTANEOUS at 21:27

## 2019-09-23 RX ADMIN — Medication 10 MILLIGRAM(S): at 21:26

## 2019-09-23 RX ADMIN — SODIUM CHLORIDE 3 MILLILITER(S): 9 INJECTION INTRAMUSCULAR; INTRAVENOUS; SUBCUTANEOUS at 05:16

## 2019-09-23 RX ADMIN — KETOTIFEN FUMARATE 1 DROP(S): 0.34 SOLUTION OPHTHALMIC at 05:14

## 2019-09-23 RX ADMIN — PANTOPRAZOLE SODIUM 40 MILLIGRAM(S): 20 TABLET, DELAYED RELEASE ORAL at 05:14

## 2019-09-23 RX ADMIN — AMLODIPINE BESYLATE 10 MILLIGRAM(S): 2.5 TABLET ORAL at 05:14

## 2019-09-23 RX ADMIN — HYDROMORPHONE HYDROCHLORIDE 0.5 MILLIGRAM(S): 2 INJECTION INTRAMUSCULAR; INTRAVENOUS; SUBCUTANEOUS at 22:18

## 2019-09-23 RX ADMIN — CHLORHEXIDINE GLUCONATE 15 MILLILITER(S): 213 SOLUTION TOPICAL at 05:14

## 2019-09-23 RX ADMIN — FAMOTIDINE 20 MILLIGRAM(S): 10 INJECTION INTRAVENOUS at 18:18

## 2019-09-23 RX ADMIN — ATORVASTATIN CALCIUM 80 MILLIGRAM(S): 80 TABLET, FILM COATED ORAL at 00:16

## 2019-09-23 RX ADMIN — Medication 25 MILLIGRAM(S): at 05:16

## 2019-09-23 RX ADMIN — SODIUM CHLORIDE 3 MILLILITER(S): 9 INJECTION INTRAMUSCULAR; INTRAVENOUS; SUBCUTANEOUS at 14:14

## 2019-09-23 RX ADMIN — INSULIN GLARGINE 5 UNIT(S): 100 INJECTION, SOLUTION SUBCUTANEOUS at 00:15

## 2019-09-23 RX ADMIN — CHLORHEXIDINE GLUCONATE 15 MILLILITER(S): 213 SOLUTION TOPICAL at 17:45

## 2019-09-23 RX ADMIN — KETOTIFEN FUMARATE 1 DROP(S): 0.34 SOLUTION OPHTHALMIC at 18:19

## 2019-09-23 RX ADMIN — GLYCOPYRROLATE AND FORMOTEROL FUMARATE 2 PUFF(S): 9; 4.8 AEROSOL, METERED RESPIRATORY (INHALATION) at 05:14

## 2019-09-23 NOTE — PROGRESS NOTE ADULT - SUBJECTIVE AND OBJECTIVE BOX
S: Seen in CTU s/p CABG. Intubated/sedated. Unable to obtain ROS.      MEDICATIONS  (STANDING):  aspirin enteric coated 81 milliGRAM(s) Oral daily  aspirin Suppository 300 milliGRAM(s) Rectal once  chlorhexidine 0.12% Liquid 15 milliLiter(s) Oral Mucosa every 12 hours  chlorhexidine 2% Cloths 1 Application(s) Topical <User Schedule>  dexmedetomidine Infusion 0.2 MICROgram(s)/kG/Hr (4.105 mL/Hr) IV Continuous <Continuous>  dextrose 5%. 1000 milliLiter(s) (15 mL/Hr) IV Continuous <Continuous>  dextrose 50% Injectable 50 milliLiter(s) IV Push every 15 minutes  dextrose 50% Injectable 25 milliLiter(s) IV Push every 15 minutes  DOBUTamine Infusion 2.5 MICROgram(s)/kG/Min (6.157 mL/Hr) IV Continuous <Continuous>  docusate sodium 100 milliGRAM(s) Oral three times a day  famotidine Injectable 20 milliGRAM(s) IV Push every 24 hours  insulin regular Infusion 3 Unit(s)/Hr (3 mL/Hr) IV Continuous <Continuous>  ketotifen 0.025% Ophthalmic Solution 1 Drop(s) Both EYES two times a day  meperidine     Injectable 25 milliGRAM(s) IV Push once  methimazole 5 milliGRAM(s) Oral daily  metoclopramide Injectable 10 milliGRAM(s) IV Push every 8 hours  norepinephrine Infusion 0.05 MICROgram(s)/kG/Min (7.697 mL/Hr) IV Continuous <Continuous>  sodium chloride 0.9% lock flush 3 milliLiter(s) IV Push every 8 hours  sodium chloride 0.9%. 1000 milliLiter(s) (10 mL/Hr) IV Continuous <Continuous>  tiotropium 18 MICROgram(s) Capsule 1 Capsule(s) Inhalation daily  vasopressin Infusion 0.1 Unit(s)/Min (6 mL/Hr) IV Continuous <Continuous>    MEDICATIONS  (PRN):  ALBUTerol/ipratropium for Nebulization 3 milliLiter(s) Nebulizer every 6 hours PRN Shortness of Breath and/or Wheezing  oxyCODONE    5 mG/acetaminophen 325 mG 1 Tablet(s) Oral every 6 hours PRN Mild Pain (1 - 3)  oxyCODONE    5 mG/acetaminophen 325 mG 2 Tablet(s) Oral every 6 hours PRN Moderate Pain (4 - 6)      LABS:  CARDIAC MARKERS ( 23 Sep 2019 13:52 )   U/L / CKMB83.7 ng/mL / Troponin Tx     /                            11.8   20.9  )-----------( 137      ( 23 Sep 2019 13:52 )             35.8     Hemoglobin: 11.8 g/dL ( @ 13:52)  Hemoglobin: 12.1 g/dL ( @ 09:49)  Hemoglobin: 11.7 g/dL ( @ 13:49)  Hemoglobin: 11.9 g/dL ( @ 06:54)  Hemoglobin: 12.8 g/dL ( @ 05:39)        133<L>  |  98  |  31<H>  ----------------------------<  160<H>  4.5   |  24  |  4.38<H>    Ca    8.5      23 Sep 2019 13:52  Phos  1.9       Mg     1.8         TPro  5.0<L>  /  Alb  2.8<L>  /  TBili  0.7  /  DBili  x   /  AST  48<H>  /  ALT  23  /  AlkPhos  42      Creatinine Trend: 4.38<--, 5.34<--, 7.49<--, 5.55<--, 8.20<--, 6.59<--   PT/INR - ( 23 Sep 2019 13:52 )   PT: 16.3 sec;   INR: 1.41 ratio         PTT - ( 23 Sep 2019 13:52 )  PTT:30.3 sec  CARDIAC MARKERS ( 23 Sep 2019 13:52 )  x     / x     / 524 U/L / x     / 83.7 ng/mL          19 @ 07:01  -  19 @ 07:00  --------------------------------------------------------  IN: 840 mL / OUT: 2600 mL / NET: -1760 mL    19 @ 07:19 @ 16:08  --------------------------------------------------------  IN: 81 mL / OUT: 85 mL / NET: -4 mL        PHYSICAL EXAM  Vital Signs Last 24 Hrs  T(C): 36.1 (23 Sep 2019 15:00), Max: 36.8 (22 Sep 2019 23:30)  T(F): 97 (23 Sep 2019 15:00), Max: 98.3 (23 Sep 2019 05:13)  HR: 79 (23 Sep 2019 15:15) (64 - 82)  BP: 174/82 (23 Sep 2019 05:13) (149/70 - 179/79)  BP(mean): --  RR: 12 (23 Sep 2019 15:15) (12 - 18)  SpO2: 99% (23 Sep 2019 15:15) (95% - 100%)      Gen: Intubated/Sedated  HEENT:  (-)icterus (-)pallor  CV: N S1 S2 1/6 DEJAH (+)2 Pulses B/l  Resp:  Coarse BS B/L, normal effort  GI: (+) BS Soft, NT, ND  Lymph:  (-)Edema, (-)obvious lymphadenopathy  Skin: Warm to touch, Normal turgor  Psych: Unable to assess    TELEMETRY: NSR 80s      < from: Cardiac Cath Lab - Adult (19 @ 12:28) >  PROCEDURE:  --  Left heart catheterization.  --  Left coronary angiography.  --  Right coronary angiography.  --  Sonosite - Diagnostic.  VENTRICLES: No LV gram was performed; however, a recent echocardiogram  demonstrated normal global and regional LV function.  CORONARY VESSELS: The coronary circulation is right dominant.  LM:   --  Ostial LM: There was a discrete 90 % stenosis.  LAD:   --  Mid LAD: Angiography showed minor luminal irregularities with no  flow limiting lesions. The stent in the mid LAD is patent.  --  Distal LAD: The vessel was small sized. Angiography showed moderate  atherosclerosis.  CX:   --  Mid circumflex: There was a tubular 50 % stenosis at the site of  a prior stent.  RI:   --  Proximal ramus intermedius: There was a diffuse 50 % stenosis at  the site of a prior stent.  RCA:   --  Mid RCA: There was a tubular 50 % stenosis at the site of a  prior stent.  --  RPLS: Angiography showed minor luminal irregularities with no flow  limiting lesions.  COMPLICATIONS: There were no complications.  DIAGNOSTIC RECOMMENDATIONS: Consultation with a cardiac surgeon be obtained  for surgical opinion and coronary artery bypass grafting.  Prepared and signed by  Jessy Méndez M.D.  Signed 2019 13:42:23  HEMODYNAMIC TABLES  Pressures:  NO PHASE  Pressures:  - HR: 69  Pressures:  - Rhythm:  Pressures:  -- Aortic Pressure (S/D/M): 170/70/113  Pressures:  -- Left Ventricle (s/edp): 164/28/--  Outputs:  NO PHASE  Outputs:  -- CALCULATIONS[age]  Outputs:  -- CALCULATIONS: Body Surface Area: 1.92  Outputs:  -- CALCULATIONS: Height in cm: 168.00  Outputs:  -- CALCULATIONS: Sex: Male  Outputs:  -- CALCULATIONS: Weight in k.60  Outputs:  -- OUTPUTS: O2 consumption: 239.38  Outputs:  -- OUTPUTS: Vo2 Indexed: 125.00  < end of copied text >    ASSESSMENT/PLAN: 68 y/o male with PMHx of CAD, HTN, HLD, T2DM, and ESRD on HD presented after abnormal stress test to [facility] for elective cardiac catheterization. Patient admitted post cath after cath revealed severe ostial LM disease. Now transferred to [facility] for CABG. S/p CABG on .  	   - S/P CABG today - f/u operative note  - Continue to monitor tele  - Continue ASA, Statin/BB when able  - Pain control  - Pressor support/Vent management per CTU  - Supportive care per CTU appreciated    Wilfred Robin PA-C  [facility]  Pager: [phone]

## 2019-09-23 NOTE — PROGRESS NOTE ADULT - SUBJECTIVE AND OBJECTIVE BOX
Chief complaint  Patient is a 69y old  Male who presents with a chief complaint of chest pain (22 Sep 2019 14:57)   Review of systems  Patient in bed, looks comfortable, no fever, no hypoglycemia.    Labs and Fingersticks  CAPILLARY BLOOD GLUCOSE      POCT Blood Glucose.: 130 mg/dL (23 Sep 2019 06:14)  POCT Blood Glucose.: 136 mg/dL (23 Sep 2019 00:14)  POCT Blood Glucose.: 106 mg/dL (22 Sep 2019 22:50)  POCT Blood Glucose.: 168 mg/dL (22 Sep 2019 18:06)  POCT Blood Glucose.: 151 mg/dL (22 Sep 2019 17:25)      Anion Gap, Serum: 17 (09-22 @ 09:44)      Calcium, Total Serum: 9.3 (09-22 @ 09:44)          09-22    133<L>  |  90<L>  |  39<H>  ----------------------------<  401<H>  4.4   |  26  |  5.34<H>    Ca    9.3      22 Sep 2019 09:44                          12.1   7.96  )-----------( 161      ( 23 Sep 2019 09:49 )             38.4     Medications  MEDICATIONS  (STANDING):      Physical Exam  General: Patient comfortable in bed  Vital Signs Last 12 Hrs  T(F): 98.3 (09-23-19 @ 05:13), Max: 98.3 (09-23-19 @ 05:13)  HR: 68 (09-23-19 @ 05:13) (68 - 69)  BP: 174/82 (09-23-19 @ 05:13) (174/82 - 179/79)  BP(mean): --  RR: 18 (09-23-19 @ 05:13) (16 - 18)  SpO2: 96% (09-23-19 @ 05:13) (95% - 96%)  Neck: No palpable thyroid nodules.  CVS: S1S2, No murmurs  Respiratory: No wheezing, no crepitations  GI: Abdomen soft, bowel sounds positive  Musculoskeletal:  edema lower extremities.   Skin: No skin rashes, no ecchymosis    Diagnostics Chief complaint  Patient is a 69y old  Male who presents with a chief complaint of chest pain (22 Sep 2019 14:57)   Review of systems  Patient in bed, looks comfortable, no fever,  no hypoglycemia.    Labs and Fingersticks  CAPILLARY BLOOD GLUCOSE      POCT Blood Glucose.: 130 mg/dL (23 Sep 2019 06:14)  POCT Blood Glucose.: 136 mg/dL (23 Sep 2019 00:14)  POCT Blood Glucose.: 106 mg/dL (22 Sep 2019 22:50)  POCT Blood Glucose.: 168 mg/dL (22 Sep 2019 18:06)  POCT Blood Glucose.: 151 mg/dL (22 Sep 2019 17:25)      Anion Gap, Serum: 17 (09-22 @ 09:44)      Calcium, Total Serum: 9.3 (09-22 @ 09:44)          09-22    133<L>  |  90<L>  |  39<H>  ----------------------------<  401<H>  4.4   |  26  |  5.34<H>    Ca    9.3      22 Sep 2019 09:44                          12.1   7.96  )-----------( 161      ( 23 Sep 2019 09:49 )             38.4     Medications  MEDICATIONS  (STANDING):      Physical Exam  General: Patient comfortable in bed  Vital Signs Last 12 Hrs  T(F): 98.3 (09-23-19 @ 05:13), Max: 98.3 (09-23-19 @ 05:13)  HR: 68 (09-23-19 @ 05:13) (68 - 69)  BP: 174/82 (09-23-19 @ 05:13) (174/82 - 179/79)  BP(mean): --  RR: 18 (09-23-19 @ 05:13) (16 - 18)  SpO2: 96% (09-23-19 @ 05:13) (95% - 96%)  Neck: No palpable thyroid nodules.  CVS: S1S2, No murmurs  Respiratory: No wheezing, no crepitations  GI: Abdomen soft, bowel sounds positive  Musculoskeletal:  edema lower extremities.   Skin: No skin rashes, no ecchymosis    Diagnostics

## 2019-09-23 NOTE — PROGRESS NOTE ADULT - SUBJECTIVE AND OBJECTIVE BOX
VIOLETTA RENTERIA  MRN#:  [id number]    The patient is a 69y Male with PMH of ESRD, s/p PCI, NIDDM, h/o CVA, recent symptoms of HENLEY, work up revealed evidence of a previous MI and multivessel CAD, now recovering s/p C3L today with moderate left ventricular systolic function showing some improvement with revascularization, who was seen, evaluated, & examined with the CTICU staff on rounds and later in the evening with a multidisciplinary care plan formulated & implemented.  All available clinical, laboratory, radiographic, pharmacologic, and electrocardiographic data were reviewed & analyzed.      The patient was in the CTICU in critical condition secondary to persistent cardiopulmonary dysfunction, hemodynamically significant anemia/hypovolemia-shock, hemodynamically significant bradycardia, persistent cardiovascular dysfunction, acidosis, & hyperglycemia.      Respiratory status required full ventilatory support, close monitoring of respiratory rate and breathing pattern, the following of ABG’s with A-line monitoring, continuous pulse oximetry monitoring, an IV Dexmedetomidine infusion & an IV Propofol infusion for support & to evaluate for & prevent further decompensation secondary to persistent cardiopulmonary dysfunction.     Invasive hemodynamic monitoring with a central venous catheter & an A-line were required for the continuous central venous and MAP/BP monitoring to ensure adequate cardiovascular support and to evaluate for & help prevent decompensation while receiving intermittent volume expansion, external epicardial pacing, blood transfusions, blood product transfusions, an IV Levophed drip, an IV Vasopressin drip, an IV Epinephrine drip, an IV Dobutamine drip, an IV Primacor drip, an IV Amiodarone drip, & an IV Neosynephrine drip secondary to hemodynamically significant anemia/anemia due to acute blood loss, hypovolemia-shock, cardiovascular dysfunction, and hemodynamically significant bradycardia.       Metabolic stability, uncontrolled type 2 diabetes-hyperglycemia, & infection prophylaxis required an IV regular Insulin drip & the following of serial glucose levels to help achieve & maintain euglycemia.      Patient required acute postoperative critical care management and I provided 35 minutes of non-continuous care to the patient. I reviewed and assessed all available clinical, laboratory, radiographic, pharmacologic, and electrocardiographic data in order to decide on maintenance or adjustment of medications, ventilator settings/management of respiratory status, and fluid management.  Discussed at length with the CTICU staff and helped coordinate care. VIOLETTA RENTERIA  MRN#:  [id number]    The patient is a 69y Male with PMH of ESRD, s/p PCI, NIDDM, h/o CVA, recent symptoms of HENLEY, work up revealed evidence of a previous MI and multivessel CAD, now recovering s/p C3L today with moderate left ventricular systolic function showing some improvement with revascularization, who was seen, evaluated, & examined with the CTICU staff on rounds and later in the evening with a multidisciplinary care plan formulated & implemented.  All available clinical, laboratory, radiographic, pharmacologic, and electrocardiographic data were reviewed & analyzed.      The patient was in the CTICU in critical condition secondary to persistent cardiopulmonary dysfunction, hemodynamically significant anemia, persistent cardiovascular dysfunction, & hyperglycemia.      Respiratory status required full ventilatory support, close monitoring of respiratory rate and breathing pattern, the following of ABG’s with A-line monitoring, continuous pulse oximetry monitoring, and an IV Dexmedetomidine infusion for support & to evaluate for & prevent further decompensation secondary to persistent cardiopulmonary dysfunction.     Invasive hemodynamic monitoring with a central venous catheter & an A-line were required for the continuous central venous and MAP/BP monitoring to ensure adequate cardiovascular support and to evaluate for & help prevent decompensation while receiving an IV Vasopressin drip and an IV Dobutamine drip secondary to hemodynamically significant anemia/anemia due to acute blood loss, acute on chronic systolic heart failure and ESRD.    Monitor potassium and pH every 2-3 hours due to ESRD requiring intermittent HD.    Metabolic stability, uncontrolled type 2 diabetes-hyperglycemia, & infection prophylaxis required an IV regular Insulin drip & the following of serial glucose levels to help achieve & maintain euglycemia.      Patient required acute postoperative critical care management and I provided 40 minutes of non-continuous care to the patient. I reviewed and assessed all available clinical, laboratory, radiographic, pharmacologic, and electrocardiographic data in order to decide on maintenance or adjustment of medications, ventilator settings/management of respiratory status, and fluid management.  Discussed at length with the CTICU staff and helped coordinate care.

## 2019-09-23 NOTE — PROGRESS NOTE ADULT - ASSESSMENT
Assessment  DMT2: 69y Male with DM T2 with hyperglycemia, was on oral meds at home, now on insulin, blood sugars trending within acceptable range, no hypoglycemic episode, compliant with low carb diet, NPO for CABG today.  CAD: Scheduled for CABG today, on medications, no chest pain, stable, monitored.  HTN: Controlled,  on antihypertensive medications.  Hyperthyroidism: On low dose Methimazole.  ESRD: Monitor labs/BMP            Yg Mackey MD  Cell: [phone]  Office: [phone] Assessment  DMT2: 69y Male with DM T2 with hyperglycemia, was on oral meds at home, now on insulin, blood sugars trending within acceptable range,  no hypoglycemic episode, compliant with low carb diet, NPO for CABG today.  CAD: Scheduled for CABG today, on medications, no chest pain, stable, monitored.  HTN: Controlled,  on antihypertensive medications.  Hyperthyroidism: On low dose Methimazole.  ESRD: Monitor labs/BMP            Yg Mackey MD  Cell: [phone]  Office: [phone]

## 2019-09-23 NOTE — PROGRESS NOTE ADULT - PROBLEM SELECTOR PLAN 2
CABG today, On medications,  no chest pain, stable, monitored and followed up by primary cardiothoracic team/cardiology team.

## 2019-09-23 NOTE — PROGRESS NOTE ADULT - PROBLEM SELECTOR PLAN 1
Will continue current insulin regimen for now. Will continue monitoring FS, log, and FU.  Patient counseled for compliance with consistent low carb diet.

## 2019-09-24 LAB
ALBUMIN SERPL ELPH-MCNC: 2.7 G/DL — LOW (ref 3.3–5)
ALP SERPL-CCNC: 40 U/L — SIGNIFICANT CHANGE UP (ref 40–120)
ALT FLD-CCNC: 29 U/L — SIGNIFICANT CHANGE UP (ref 10–45)
ANION GAP SERPL CALC-SCNC: 15 MMOL/L — SIGNIFICANT CHANGE UP (ref 5–17)
APTT BLD: 27 SEC — LOW (ref 27.5–36.3)
AST SERPL-CCNC: 47 U/L — HIGH (ref 10–40)
BILIRUB SERPL-MCNC: 0.3 MG/DL — SIGNIFICANT CHANGE UP (ref 0.2–1.2)
BUN SERPL-MCNC: 42 MG/DL — HIGH (ref 7–23)
CALCIUM SERPL-MCNC: 8.8 MG/DL — SIGNIFICANT CHANGE UP (ref 8.4–10.5)
CHLORIDE SERPL-SCNC: 102 MMOL/L — SIGNIFICANT CHANGE UP (ref 96–108)
CO2 SERPL-SCNC: 19 MMOL/L — LOW (ref 22–31)
CREAT SERPL-MCNC: 5.44 MG/DL — HIGH (ref 0.5–1.3)
GAS PNL BLDA: SIGNIFICANT CHANGE UP
GLUCOSE BLDC GLUCOMTR-MCNC: 118 MG/DL — HIGH (ref 70–99)
GLUCOSE BLDC GLUCOMTR-MCNC: 120 MG/DL — HIGH (ref 70–99)
GLUCOSE BLDC GLUCOMTR-MCNC: 122 MG/DL — HIGH (ref 70–99)
GLUCOSE BLDC GLUCOMTR-MCNC: 123 MG/DL — HIGH (ref 70–99)
GLUCOSE BLDC GLUCOMTR-MCNC: 136 MG/DL — HIGH (ref 70–99)
GLUCOSE BLDC GLUCOMTR-MCNC: 137 MG/DL — HIGH (ref 70–99)
GLUCOSE BLDC GLUCOMTR-MCNC: 138 MG/DL — HIGH (ref 70–99)
GLUCOSE BLDC GLUCOMTR-MCNC: 155 MG/DL — HIGH (ref 70–99)
GLUCOSE BLDC GLUCOMTR-MCNC: 160 MG/DL — HIGH (ref 70–99)
GLUCOSE BLDC GLUCOMTR-MCNC: 163 MG/DL — HIGH (ref 70–99)
GLUCOSE BLDC GLUCOMTR-MCNC: 166 MG/DL — HIGH (ref 70–99)
GLUCOSE BLDC GLUCOMTR-MCNC: 211 MG/DL — HIGH (ref 70–99)
GLUCOSE SERPL-MCNC: 208 MG/DL — HIGH (ref 70–99)
HCT VFR BLD CALC: 33.3 % — LOW (ref 39–50)
HGB BLD-MCNC: 11 G/DL — LOW (ref 13–17)
INR BLD: 1.18 RATIO — HIGH (ref 0.88–1.16)
MAGNESIUM SERPL-MCNC: 1.8 MG/DL — SIGNIFICANT CHANGE UP (ref 1.6–2.6)
MCHC RBC-ENTMCNC: 29.8 PG — SIGNIFICANT CHANGE UP (ref 27–34)
MCHC RBC-ENTMCNC: 33 GM/DL — SIGNIFICANT CHANGE UP (ref 32–36)
MCV RBC AUTO: 90.3 FL — SIGNIFICANT CHANGE UP (ref 80–100)
PHOSPHATE SERPL-MCNC: 2.6 MG/DL — SIGNIFICANT CHANGE UP (ref 2.5–4.5)
PLATELET # BLD AUTO: 115 K/UL — LOW (ref 150–400)
POTASSIUM SERPL-MCNC: 5.8 MMOL/L — HIGH (ref 3.5–5.3)
POTASSIUM SERPL-SCNC: 5.8 MMOL/L — HIGH (ref 3.5–5.3)
PROT SERPL-MCNC: 5.1 G/DL — LOW (ref 6–8.3)
PROTHROM AB SERPL-ACNC: 13.5 SEC — HIGH (ref 10–12.9)
RBC # BLD: 3.69 M/UL — LOW (ref 4.2–5.8)
RBC # FLD: 11.8 % — SIGNIFICANT CHANGE UP (ref 10.3–14.5)
SODIUM SERPL-SCNC: 136 MMOL/L — SIGNIFICANT CHANGE UP (ref 135–145)
WBC # BLD: 10.7 K/UL — HIGH (ref 3.8–10.5)
WBC # FLD AUTO: 10.7 K/UL — HIGH (ref 3.8–10.5)

## 2019-09-24 PROCEDURE — 71045 X-RAY EXAM CHEST 1 VIEW: CPT | Mod: 26

## 2019-09-24 PROCEDURE — 99291 CRITICAL CARE FIRST HOUR: CPT

## 2019-09-24 PROCEDURE — 93010 ELECTROCARDIOGRAM REPORT: CPT

## 2019-09-24 RX ORDER — SIMVASTATIN 20 MG/1
40 TABLET, FILM COATED ORAL AT BEDTIME
Refills: 0 | Status: DISCONTINUED | OUTPATIENT
Start: 2019-09-24 | End: 2019-09-27

## 2019-09-24 RX ORDER — HEPARIN SODIUM 5000 [USP'U]/ML
5000 INJECTION INTRAVENOUS; SUBCUTANEOUS EVERY 8 HOURS
Refills: 0 | Status: DISCONTINUED | OUTPATIENT
Start: 2019-09-24 | End: 2019-09-27

## 2019-09-24 RX ORDER — HYDROMORPHONE HYDROCHLORIDE 2 MG/ML
0.5 INJECTION INTRAMUSCULAR; INTRAVENOUS; SUBCUTANEOUS ONCE
Refills: 0 | Status: DISCONTINUED | OUTPATIENT
Start: 2019-09-24 | End: 2019-09-24

## 2019-09-24 RX ORDER — INSULIN HUMAN 100 [IU]/ML
8 INJECTION, SOLUTION SUBCUTANEOUS ONCE
Refills: 0 | Status: COMPLETED | OUTPATIENT
Start: 2019-09-24 | End: 2019-09-24

## 2019-09-24 RX ORDER — LABETALOL HCL 100 MG
10 TABLET ORAL ONCE
Refills: 0 | Status: COMPLETED | OUTPATIENT
Start: 2019-09-24 | End: 2019-09-24

## 2019-09-24 RX ORDER — LABETALOL HCL 100 MG
200 TABLET ORAL EVERY 12 HOURS
Refills: 0 | Status: DISCONTINUED | OUTPATIENT
Start: 2019-09-24 | End: 2019-09-26

## 2019-09-24 RX ORDER — DEXTROSE 50 % IN WATER 50 %
50 SYRINGE (ML) INTRAVENOUS ONCE
Refills: 0 | Status: COMPLETED | OUTPATIENT
Start: 2019-09-24 | End: 2019-09-24

## 2019-09-24 RX ORDER — METOPROLOL TARTRATE 50 MG
5 TABLET ORAL ONCE
Refills: 0 | Status: COMPLETED | OUTPATIENT
Start: 2019-09-24 | End: 2019-09-24

## 2019-09-24 RX ORDER — SODIUM BICARBONATE 1 MEQ/ML
50 SYRINGE (ML) INTRAVENOUS ONCE
Refills: 0 | Status: COMPLETED | OUTPATIENT
Start: 2019-09-24 | End: 2019-09-24

## 2019-09-24 RX ORDER — CALCIUM GLUCONATE 100 MG/ML
1 VIAL (ML) INTRAVENOUS ONCE
Refills: 0 | Status: COMPLETED | OUTPATIENT
Start: 2019-09-24 | End: 2019-09-24

## 2019-09-24 RX ORDER — FENTANYL CITRATE 50 UG/ML
50 INJECTION INTRAVENOUS ONCE
Refills: 0 | Status: DISCONTINUED | OUTPATIENT
Start: 2019-09-24 | End: 2019-09-24

## 2019-09-24 RX ADMIN — SODIUM CHLORIDE 3 MILLILITER(S): 9 INJECTION INTRAMUSCULAR; INTRAVENOUS; SUBCUTANEOUS at 21:01

## 2019-09-24 RX ADMIN — DEXMEDETOMIDINE HYDROCHLORIDE IN 0.9% SODIUM CHLORIDE 4.11 MICROGRAM(S)/KG/HR: 4 INJECTION INTRAVENOUS at 00:35

## 2019-09-24 RX ADMIN — Medication 6.16 MICROGRAM(S)/KG/MIN: at 00:35

## 2019-09-24 RX ADMIN — Medication 200 GRAM(S): at 00:33

## 2019-09-24 RX ADMIN — SODIUM CHLORIDE 3 MILLILITER(S): 9 INJECTION INTRAMUSCULAR; INTRAVENOUS; SUBCUTANEOUS at 05:39

## 2019-09-24 RX ADMIN — Medication 10 MILLIGRAM(S): at 22:48

## 2019-09-24 RX ADMIN — Medication 100 MILLIGRAM(S): at 15:25

## 2019-09-24 RX ADMIN — Medication 100 MILLIGRAM(S): at 12:38

## 2019-09-24 RX ADMIN — VASOPRESSIN 6 UNIT(S)/MIN: 20 INJECTION INTRAVENOUS at 00:35

## 2019-09-24 RX ADMIN — SIMVASTATIN 40 MILLIGRAM(S): 20 TABLET, FILM COATED ORAL at 21:48

## 2019-09-24 RX ADMIN — CHLORHEXIDINE GLUCONATE 1 APPLICATION(S): 213 SOLUTION TOPICAL at 05:38

## 2019-09-24 RX ADMIN — HEPARIN SODIUM 5000 UNIT(S): 5000 INJECTION INTRAVENOUS; SUBCUTANEOUS at 15:25

## 2019-09-24 RX ADMIN — Medication 10 MILLIGRAM(S): at 22:00

## 2019-09-24 RX ADMIN — HYDROMORPHONE HYDROCHLORIDE 0.5 MILLIGRAM(S): 2 INJECTION INTRAMUSCULAR; INTRAVENOUS; SUBCUTANEOUS at 06:00

## 2019-09-24 RX ADMIN — FENTANYL CITRATE 50 MICROGRAM(S): 50 INJECTION INTRAVENOUS at 09:02

## 2019-09-24 RX ADMIN — INSULIN HUMAN 8 UNIT(S): 100 INJECTION, SOLUTION SUBCUTANEOUS at 00:36

## 2019-09-24 RX ADMIN — HYDROMORPHONE HYDROCHLORIDE 0.5 MILLIGRAM(S): 2 INJECTION INTRAMUSCULAR; INTRAVENOUS; SUBCUTANEOUS at 06:15

## 2019-09-24 RX ADMIN — Medication 50 MILLIEQUIVALENT(S): at 02:09

## 2019-09-24 RX ADMIN — Medication 100 MILLIGRAM(S): at 21:49

## 2019-09-24 RX ADMIN — FAMOTIDINE 20 MILLIGRAM(S): 10 INJECTION INTRAVENOUS at 18:30

## 2019-09-24 RX ADMIN — HYDROMORPHONE HYDROCHLORIDE 0.5 MILLIGRAM(S): 2 INJECTION INTRAMUSCULAR; INTRAVENOUS; SUBCUTANEOUS at 00:00

## 2019-09-24 RX ADMIN — HEPARIN SODIUM 5000 UNIT(S): 5000 INJECTION INTRAVENOUS; SUBCUTANEOUS at 21:48

## 2019-09-24 RX ADMIN — KETOTIFEN FUMARATE 1 DROP(S): 0.34 SOLUTION OPHTHALMIC at 05:39

## 2019-09-24 RX ADMIN — Medication 10 MILLIGRAM(S): at 05:38

## 2019-09-24 RX ADMIN — Medication 10 MILLIGRAM(S): at 15:25

## 2019-09-24 RX ADMIN — SODIUM CHLORIDE 3 MILLILITER(S): 9 INJECTION INTRAMUSCULAR; INTRAVENOUS; SUBCUTANEOUS at 14:58

## 2019-09-24 RX ADMIN — Medication 50 MILLILITER(S): at 00:00

## 2019-09-24 RX ADMIN — Medication 81 MILLIGRAM(S): at 12:46

## 2019-09-24 RX ADMIN — FENTANYL CITRATE 50 MICROGRAM(S): 50 INJECTION INTRAVENOUS at 09:15

## 2019-09-24 RX ADMIN — INSULIN HUMAN 3 UNIT(S)/HR: 100 INJECTION, SOLUTION SUBCUTANEOUS at 21:49

## 2019-09-24 RX ADMIN — Medication 200 MILLIGRAM(S): at 22:48

## 2019-09-24 RX ADMIN — Medication 5 MILLIGRAM(S): at 20:00

## 2019-09-24 RX ADMIN — INSULIN HUMAN 3 UNIT(S)/HR: 100 INJECTION, SOLUTION SUBCUTANEOUS at 00:35

## 2019-09-24 RX ADMIN — HYDROMORPHONE HYDROCHLORIDE 0.5 MILLIGRAM(S): 2 INJECTION INTRAMUSCULAR; INTRAVENOUS; SUBCUTANEOUS at 00:15

## 2019-09-24 NOTE — PROGRESS NOTE ADULT - PROBLEM SELECTOR PLAN 1
pt was hyperkalemic- s/p HD today 1.5 hours and discontinued 2/2 hypotension  repeat k improved- will monitor off HD for the rest of the day and re eval tomm  trend bmp

## 2019-09-24 NOTE — PHYSICAL THERAPY INITIAL EVALUATION ADULT - ADDITIONAL COMMENTS
Pt states he lives with spouse and sons in a private home with 4 steps to enter (+handrail), all needs met on first floor. Pt states prior to admission being independent with all functional mobility and ADLs. pt states he was driving prior to admission.

## 2019-09-24 NOTE — PROGRESS NOTE ADULT - PROBLEM SELECTOR PLAN 2
S/P CABG. On medications,  no chest pain, stable, monitored and followed up by primary cardiothoracic team/cardiology team.

## 2019-09-24 NOTE — PROGRESS NOTE ADULT - SUBJECTIVE AND OBJECTIVE BOX
S: Extubated. Pain controlled. No complaints. Denies chest pain or SOB. Review of systems otherwise (-)      MEDICATIONS  (STANDING):  aspirin enteric coated 81 milliGRAM(s) Oral daily  aspirin Suppository 300 milliGRAM(s) Rectal once  cefuroxime  IVPB 1500 milliGRAM(s) IV Intermittent every 24 hours  chlorhexidine 2% Cloths 1 Application(s) Topical <User Schedule>  dextrose 5%. 1000 milliLiter(s) (15 mL/Hr) IV Continuous <Continuous>  dextrose 50% Injectable 50 milliLiter(s) IV Push every 15 minutes  dextrose 50% Injectable 25 milliLiter(s) IV Push every 15 minutes  DOBUTamine Infusion 2.5 MICROgram(s)/kG/Min (6.157 mL/Hr) IV Continuous <Continuous>  docusate sodium 100 milliGRAM(s) Oral three times a day  famotidine Injectable 20 milliGRAM(s) IV Push every 24 hours  heparin  Injectable 5000 Unit(s) SubCutaneous every 8 hours  insulin regular Infusion 3 Unit(s)/Hr (3 mL/Hr) IV Continuous <Continuous>  ketotifen 0.025% Ophthalmic Solution 1 Drop(s) Both EYES two times a day  methimazole 5 milliGRAM(s) Oral daily  metoclopramide Injectable 10 milliGRAM(s) IV Push every 8 hours  simvastatin 40 milliGRAM(s) Oral at bedtime  sodium chloride 0.9% lock flush 3 milliLiter(s) IV Push every 8 hours  sodium chloride 0.9%. 1000 milliLiter(s) (10 mL/Hr) IV Continuous <Continuous>  tiotropium 18 MICROgram(s) Capsule 1 Capsule(s) Inhalation daily    MEDICATIONS  (PRN):  ALBUTerol/ipratropium for Nebulization 3 milliLiter(s) Nebulizer every 6 hours PRN Shortness of Breath and/or Wheezing  oxyCODONE    5 mG/acetaminophen 325 mG 1 Tablet(s) Oral every 6 hours PRN Mild Pain (1 - 3)  oxyCODONE    5 mG/acetaminophen 325 mG 2 Tablet(s) Oral every 6 hours PRN Moderate Pain (4 - 6)      LABS:  CARDIAC MARKERS ( 23 Sep 2019 13:52 )   U/L / CKMB83.7 ng/mL / Troponin Tx     /                            11.0   10.7  )-----------( 115      ( 24 Sep 2019 00:48 )             33.3     Hemoglobin: 11.0 g/dL ( @ 00:48)  Hemoglobin: 11.8 g/dL ( @ 13:52)  Hemoglobin: 12.1 g/dL ( @ 09:49)  Hemoglobin: 11.7 g/dL ( @ 13:49)  Hemoglobin: 11.9 g/dL ( @ 06:54)        136  |  102  |  42<H>  ----------------------------<  208<H>  5.8<H>   |  19<L>  |  5.44<H>    Ca    8.8      24 Sep 2019 00:48  Phos  2.6       Mg     1.8         TPro  5.1<L>  /  Alb  2.7<L>  /  TBili  0.3  /  DBili  x   /  AST  47<H>  /  ALT  29  /  AlkPhos  40      Creatinine Trend: 5.44<--, 4.38<--, 5.34<--, 7.49<--, 5.55<--, 8.20<--   PT/INR - ( 24 Sep 2019 00:48 )   PT: 13.5 sec;   INR: 1.18 ratio         PTT - ( 24 Sep 2019 00:48 )  PTT:27.0 sec  CARDIAC MARKERS ( 23 Sep 2019 13:52 )  x     / x     / 524 U/L / x     / 83.7 ng/mL          19 @ 07:01  -  19 @ 07:00  --------------------------------------------------------  IN: 627.1 mL / OUT: 345 mL / NET: 282.1 mL    19 @ 07:01  -  19 @ 13:45  --------------------------------------------------------  IN: 100.5 mL / OUT: 55 mL / NET: 45.5 mL        PHYSICAL EXAM  Vital Signs Last 24 Hrs  T(C): 37.1 (24 Sep 2019 12:00), Max: 37.7 (24 Sep 2019 00:00)  T(F): 98.8 (24 Sep 2019 12:00), Max: 99.9 (24 Sep 2019 00:00)  HR: 91 (24 Sep 2019 13:00) (79 - 101)  BP: 137/73 (24 Sep 2019 12:00) (137/73 - 137/73)  BP(mean): 98 (24 Sep 2019 12:00) (98 - 98)  RR: 23 (24 Sep 2019 13:00) (12 - 26)  SpO2: 94% (24 Sep 2019 13:00) (91% - 100%)        Gen: NAD  HEENT:  (-)icterus (-)pallor  CV: N S1 S2 1/6 DEJAH (+)2 Pulses B/l  Resp:  Coarse BS B/L, normal effort  GI: (+) BS Soft, NT, ND  Lymph:  (-)Edema, (-)obvious lymphadenopathy  Skin: Warm to touch, Normal turgor  Psych: Appropriate mood and affect    TELEMETRY: NSR 90s, PVC      < from: Cardiac Cath Lab - Adult (19 @ 12:28) >  PROCEDURE:  --  Left heart catheterization.  --  Left coronary angiography.  --  Right coronary angiography.  --  Sonosite - Diagnostic.  VENTRICLES: No LV gram was performed; however, a recent echocardiogram  demonstrated normal global and regional LV function.  CORONARY VESSELS: The coronary circulation is right dominant.  LM:   --  Ostial LM: There was a discrete 90 % stenosis.  LAD:   --  Mid LAD: Angiography showed minor luminal irregularities with no  flow limiting lesions. The stent in the mid LAD is patent.  --  Distal LAD: The vessel was small sized. Angiography showed moderate  atherosclerosis.  CX:   --  Mid circumflex: There was a tubular 50 % stenosis at the site of  a prior stent.  RI:   --  Proximal ramus intermedius: There was a diffuse 50 % stenosis at  the site of a prior stent.  RCA:   --  Mid RCA: There was a tubular 50 % stenosis at the site of a  prior stent.  --  RPLS: Angiography showed minor luminal irregularities with no flow  limiting lesions.  COMPLICATIONS: There were no complications.  DIAGNOSTIC RECOMMENDATIONS: Consultation with a cardiac surgeon be obtained  for surgical opinion and coronary artery bypass grafting.  Prepared and signed by  Jessy Méndez M.D.  Signed 2019 13:42:23  HEMODYNAMIC TABLES  Pressures:  NO PHASE  Pressures:  - HR: 69  Pressures:  - Rhythm:  Pressures:  -- Aortic Pressure (S/D/M): 170/70/113  Pressures:  -- Left Ventricle (s/edp): 164/28/--  Outputs:  NO PHASE  Outputs:  -- CALCULATIONS[age]  Outputs:  -- CALCULATIONS: Body Surface Area: 1.92  Outputs:  -- CALCULATIONS: Height in cm: 168.00  Outputs:  -- CALCULATIONS: Sex: Male  Outputs:  -- CALCULATIONS: Weight in k.60  Outputs:  -- OUTPUTS: O2 consumption: 239.38  Outputs:  -- OUTPUTS: Vo2 Indexed: 125.00  < end of copied text >    ASSESSMENT/PLAN: 68 y/o male with PMHx of CAD, HTN, HLD, T2DM, and ESRD on HD presented after abnormal stress test to [facility] for elective cardiac catheterization. Patient admitted post cath after cath revealed severe ostial LM disease. Now transferred to [facility] for CABG. S/p C3L on .  	   - Continue to monitor tele  - Continue ASA, Statin  - Resume BB when able  - Pain control  - HD per renal  - Supportive care per CTU appreciated    Wilfred Robin PA-C  [facility]  Pager: [phone]

## 2019-09-24 NOTE — PHYSICAL THERAPY INITIAL EVALUATION ADULT - GENERAL OBSERVATIONS, REHAB EVAL
pt basilio 40 min eval well. Pt s/p C3L 9/23. Pt cleared per rounds sheet. RN Lyssa present to assist throughout. Pt rec'd in bed, LIJ, 4L NC, chest tube, pacer, bernard, NAD. PT reviewed sternal precautions.

## 2019-09-24 NOTE — AIRWAY REMOVAL NOTE  ADULT & PEDS - ARTIFICAL AIRWAY REMOVAL COMMENTS
Written order for extubation verified. The patient was identified by full name and birth date compared to the identification band. Present during the procedure was Destinee Gonzalez RN.

## 2019-09-24 NOTE — PHYSICAL THERAPY INITIAL EVALUATION ADULT - DISCHARGE DISPOSITION, PT EVAL
Anticipate home with no skilled PT needs. TBD on DME. Family available to assist/supervise as needed. Discharge plan emailed to CM.

## 2019-09-24 NOTE — PROGRESS NOTE ADULT - SUBJECTIVE AND OBJECTIVE BOX
Chief complaint  Patient is a 69y old  Male who presents with a chief complaint of chest pain (22 Sep 2019 14:57)   Review of systems  Patient in bed, looks comfortable, no fever, no hypoglycemia.    Labs and Fingersticks  CAPILLARY BLOOD GLUCOSE  136 (24 Sep 2019 13:00)  123 (24 Sep 2019 12:00)  126 (24 Sep 2019 11:00)  138 (24 Sep 2019 10:00)  150 (24 Sep 2019 09:00)  118 (24 Sep 2019 08:00)  136 (24 Sep 2019 03:00)  211 (24 Sep 2019 00:00)  108 (23 Sep 2019 23:00)  114 (23 Sep 2019 22:00)  113 (23 Sep 2019 21:00)  124 (23 Sep 2019 20:00)  149 (23 Sep 2019 16:00)      POCT Blood Glucose.: 136 mg/dL (24 Sep 2019 13:04)  POCT Blood Glucose.: 123 mg/dL (24 Sep 2019 12:14)  POCT Blood Glucose.: 138 mg/dL (24 Sep 2019 10:05)  POCT Blood Glucose.: 118 mg/dL (24 Sep 2019 07:43)  POCT Blood Glucose.: 211 mg/dL (23 Sep 2019 23:59)  POCT Blood Glucose.: 126 mg/dL (23 Sep 2019 18:07)  POCT Blood Glucose.: 133 mg/dL (23 Sep 2019 17:07)      Anion Gap, Serum: 15 (09-24 @ 00:48)  Anion Gap, Serum: 11 (09-23 @ 13:52)      Calcium, Total Serum: 8.8 (09-24 @ 00:48)  Calcium, Total Serum: 8.5 (09-23 @ 13:52)  Albumin, Serum: 2.7 <L> (09-24 @ 00:48)  Albumin, Serum: 2.8 <L> (09-23 @ 13:52)    Alanine Aminotransferase (ALT/SGPT): 29 (09-24 @ 00:48)  Alanine Aminotransferase (ALT/SGPT): 23 (09-23 @ 13:52)  Alkaline Phosphatase, Serum: 40 (09-24 @ 00:48)  Alkaline Phosphatase, Serum: 42 (09-23 @ 13:52)  Aspartate Aminotransferase (AST/SGOT): 47 <H> (09-24 @ 00:48)  Aspartate Aminotransferase (AST/SGOT): 48 <H> (09-23 @ 13:52)        09-24    136  |  102  |  42<H>  ----------------------------<  208<H>  5.8<H>   |  19<L>  |  5.44<H>    Ca    8.8      24 Sep 2019 00:48  Phos  2.6     09-24  Mg     1.8     09-24    TPro  5.1<L>  /  Alb  2.7<L>  /  TBili  0.3  /  DBili  x   /  AST  47<H>  /  ALT  29  /  AlkPhos  40  09-24                        11.0   10.7  )-----------( 115      ( 24 Sep 2019 00:48 )             33.3     Medications  MEDICATIONS  (STANDING):  aspirin enteric coated 81 milliGRAM(s) Oral daily  aspirin Suppository 300 milliGRAM(s) Rectal once  cefuroxime  IVPB 1500 milliGRAM(s) IV Intermittent every 24 hours  chlorhexidine 2% Cloths 1 Application(s) Topical <User Schedule>  dextrose 5%. 1000 milliLiter(s) (15 mL/Hr) IV Continuous <Continuous>  dextrose 50% Injectable 50 milliLiter(s) IV Push every 15 minutes  dextrose 50% Injectable 25 milliLiter(s) IV Push every 15 minutes  DOBUTamine Infusion 2.5 MICROgram(s)/kG/Min (6.157 mL/Hr) IV Continuous <Continuous>  docusate sodium 100 milliGRAM(s) Oral three times a day  famotidine Injectable 20 milliGRAM(s) IV Push every 24 hours  heparin  Injectable 5000 Unit(s) SubCutaneous every 8 hours  insulin regular Infusion 3 Unit(s)/Hr (3 mL/Hr) IV Continuous <Continuous>  ketotifen 0.025% Ophthalmic Solution 1 Drop(s) Both EYES two times a day  methimazole 5 milliGRAM(s) Oral daily  metoclopramide Injectable 10 milliGRAM(s) IV Push every 8 hours  simvastatin 40 milliGRAM(s) Oral at bedtime  sodium chloride 0.9% lock flush 3 milliLiter(s) IV Push every 8 hours  sodium chloride 0.9%. 1000 milliLiter(s) (10 mL/Hr) IV Continuous <Continuous>  tiotropium 18 MICROgram(s) Capsule 1 Capsule(s) Inhalation daily      Physical Exam  General: Patient comfortable in bed  Vital Signs Last 12 Hrs  T(F): 98.8 (09-24-19 @ 12:00), Max: 99 (09-24-19 @ 04:00)  HR: 91 (09-24-19 @ 13:00) (79 - 101)  BP: 137/73 (09-24-19 @ 12:00) (137/73 - 137/73)  BP(mean): 98 (09-24-19 @ 12:00) (98 - 98)  RR: 23 (09-24-19 @ 13:00) (13 - 25)  SpO2: 94% (09-24-19 @ 13:00) (91% - 100%)  Neck: No palpable thyroid nodules.  CVS: S1S2, No murmurs  Respiratory: No wheezing, no crepitations  GI: Abdomen soft, bowel sounds positive  Musculoskeletal:  edema lower extremities.   Skin: No skin rashes, no ecchymosis    Diagnostics Chief complaint  Patient is a 69y old  Male who presents with a chief complaint of chest pain (22 Sep 2019 14:57)   Review of systems  Patient in bed, looks comfortable, no fever,  no hypoglycemia.    Labs and Fingersticks  CAPILLARY BLOOD GLUCOSE  136 (24 Sep 2019 13:00)  123 (24 Sep 2019 12:00)  126 (24 Sep 2019 11:00)  138 (24 Sep 2019 10:00)  150 (24 Sep 2019 09:00)  118 (24 Sep 2019 08:00)  136 (24 Sep 2019 03:00)  211 (24 Sep 2019 00:00)  108 (23 Sep 2019 23:00)  114 (23 Sep 2019 22:00)  113 (23 Sep 2019 21:00)  124 (23 Sep 2019 20:00)  149 (23 Sep 2019 16:00)      POCT Blood Glucose.: 136 mg/dL (24 Sep 2019 13:04)  POCT Blood Glucose.: 123 mg/dL (24 Sep 2019 12:14)  POCT Blood Glucose.: 138 mg/dL (24 Sep 2019 10:05)  POCT Blood Glucose.: 118 mg/dL (24 Sep 2019 07:43)  POCT Blood Glucose.: 211 mg/dL (23 Sep 2019 23:59)  POCT Blood Glucose.: 126 mg/dL (23 Sep 2019 18:07)  POCT Blood Glucose.: 133 mg/dL (23 Sep 2019 17:07)      Anion Gap, Serum: 15 (09-24 @ 00:48)  Anion Gap, Serum: 11 (09-23 @ 13:52)      Calcium, Total Serum: 8.8 (09-24 @ 00:48)  Calcium, Total Serum: 8.5 (09-23 @ 13:52)  Albumin, Serum: 2.7 <L> (09-24 @ 00:48)  Albumin, Serum: 2.8 <L> (09-23 @ 13:52)    Alanine Aminotransferase (ALT/SGPT): 29 (09-24 @ 00:48)  Alanine Aminotransferase (ALT/SGPT): 23 (09-23 @ 13:52)  Alkaline Phosphatase, Serum: 40 (09-24 @ 00:48)  Alkaline Phosphatase, Serum: 42 (09-23 @ 13:52)  Aspartate Aminotransferase (AST/SGOT): 47 <H> (09-24 @ 00:48)  Aspartate Aminotransferase (AST/SGOT): 48 <H> (09-23 @ 13:52)        09-24    136  |  102  |  42<H>  ----------------------------<  208<H>  5.8<H>   |  19<L>  |  5.44<H>    Ca    8.8      24 Sep 2019 00:48  Phos  2.6     09-24  Mg     1.8     09-24    TPro  5.1<L>  /  Alb  2.7<L>  /  TBili  0.3  /  DBili  x   /  AST  47<H>  /  ALT  29  /  AlkPhos  40  09-24                        11.0   10.7  )-----------( 115      ( 24 Sep 2019 00:48 )             33.3     Medications  MEDICATIONS  (STANDING):  aspirin enteric coated 81 milliGRAM(s) Oral daily  aspirin Suppository 300 milliGRAM(s) Rectal once  cefuroxime  IVPB 1500 milliGRAM(s) IV Intermittent every 24 hours  chlorhexidine 2% Cloths 1 Application(s) Topical <User Schedule>  dextrose 5%. 1000 milliLiter(s) (15 mL/Hr) IV Continuous <Continuous>  dextrose 50% Injectable 50 milliLiter(s) IV Push every 15 minutes  dextrose 50% Injectable 25 milliLiter(s) IV Push every 15 minutes  DOBUTamine Infusion 2.5 MICROgram(s)/kG/Min (6.157 mL/Hr) IV Continuous <Continuous>  docusate sodium 100 milliGRAM(s) Oral three times a day  famotidine Injectable 20 milliGRAM(s) IV Push every 24 hours  heparin  Injectable 5000 Unit(s) SubCutaneous every 8 hours  insulin regular Infusion 3 Unit(s)/Hr (3 mL/Hr) IV Continuous <Continuous>  ketotifen 0.025% Ophthalmic Solution 1 Drop(s) Both EYES two times a day  methimazole 5 milliGRAM(s) Oral daily  metoclopramide Injectable 10 milliGRAM(s) IV Push every 8 hours  simvastatin 40 milliGRAM(s) Oral at bedtime  sodium chloride 0.9% lock flush 3 milliLiter(s) IV Push every 8 hours  sodium chloride 0.9%. 1000 milliLiter(s) (10 mL/Hr) IV Continuous <Continuous>  tiotropium 18 MICROgram(s) Capsule 1 Capsule(s) Inhalation daily      Physical Exam  General: Patient comfortable in bed  Vital Signs Last 12 Hrs  T(F): 98.8 (09-24-19 @ 12:00), Max: 99 (09-24-19 @ 04:00)  HR: 91 (09-24-19 @ 13:00) (79 - 101)  BP: 137/73 (09-24-19 @ 12:00) (137/73 - 137/73)  BP(mean): 98 (09-24-19 @ 12:00) (98 - 98)  RR: 23 (09-24-19 @ 13:00) (13 - 25)  SpO2: 94% (09-24-19 @ 13:00) (91% - 100%)  Neck: No palpable thyroid nodules.  CVS: S1S2, No murmurs  Respiratory: No wheezing, no crepitations  GI: Abdomen soft, bowel sounds positive  Musculoskeletal:  edema lower extremities.   Skin: No skin rashes, no ecchymosis    Diagnostics

## 2019-09-24 NOTE — PROGRESS NOTE ADULT - SUBJECTIVE AND OBJECTIVE BOX
VIOLETTA RENTERIA  MRN-[id number]  Patient is a 69y old  Male who presents with a chief complaint of CABG (24 Sep 2019 23:40)    HPI:  69 y.o. male with pmh of ESRD( HD on tu/Th/sa) Last dialyzed yesterday 9/15, was sent to [facility] for cath after presenting with SOB and a positive stress test. Presented today for elective cardiac catheterization. The patient c/o SOB with exertion (walking <1 block) started 2 months ago. The patient denies chest pain,  palpitations, dizziness, presyncope, syncope, b/l lower  extremities edema, abdominal pain, N/V/D/C, melena, hematochezia, h/o DVT, PE, AMY, fever, chills, urinary symptoms, hematuria.  The patient was evaluated by his cardiologist, was noted to have abnormal stress test (5/13/19) - EF 50%, fixed proximal and mid inferior and inferolateral wall perfusion defect c/w MI and mild to moderate jose martin-infarct ischemia.  Echo- 8/2019 - normal LV size and function, mild diastolic dysfunction, mild TR.  Due to patient's symptoms and abnormal non invasive tests, the patient was recommended to have cardiac catheterization. The patient denies any complaints at present.   Cardiac cath performed and revealed Ostial Left main stenosis of 90%, Mid Cx in stent 50% and RCA in stent 50%; Patent stent in LAD. Patient was transferred to [facility] for CT surgery eval and possible CABG with Dr. Herzog. (16 Sep 2019 16:43)      Surgery/Hospital course:  9/23/2019 CABG C3L    pt had hypotension during HD today, which was aborted,;   later developed severe HTN , required iv lopressor and iv labetalol ,started on Labetalol po  denies chest pain or sob    Physical Exam:  Vital Signs Last 24 Hrs  T(C): 36.7 (25 Sep 2019 00:00), Max: 37.2 (24 Sep 2019 04:00)  T(F): 98 (25 Sep 2019 00:00), Max: 99 (24 Sep 2019 04:00)  HR: 79 (25 Sep 2019 00:00) (79 - 101)  BP: 137/73 (24 Sep 2019 12:00) (137/73 - 137/73)  BP(mean): 98 (24 Sep 2019 12:00) (98 - 98)  RR: 19 (25 Sep 2019 00:00) (13 - 25)  SpO2: 97% (25 Sep 2019 00:00) (91% - 100%)  Gen:  Awake, alert   CNS: non focal 	  Neck: no JVD  RES :+ rhonchi , no wheezing                    CVS: Regular  rhythm. Normal S1/S2  Abd: Soft, non-distended. Bowel sounds present.  Skin: No rash.  Ext:  no edema, A Line  ============================I/O===========================   I&O's Detail    23 Sep 2019 07:01  -  24 Sep 2019 07:00  --------------------------------------------------------  IN:    dexmedetomidine Infusion: 127.1 mL    DOBUTamine Infusion: 117.8 mL    insulin regular Infusion: 54.5 mL    IV PiggyBack: 100 mL    norepinephrine Infusion: 15.7 mL    sodium chloride 0.9%.: 190 mL    vasopressin Infusion: 22 mL  Total IN: 627.1 mL    OUT:    Chest Tube: 230 mL    Indwelling Catheter - Urethral: 115 mL  Total OUT: 345 mL    Total NET: 282.1 mL      24 Sep 2019 07:01  -  25 Sep 2019 01:20  --------------------------------------------------------  IN:    DOBUTamine Infusion: 31 mL    insulin regular Infusion: 34 mL    IV PiggyBack: 50 mL    Oral Fluid: 175 mL    sodium chloride 0.9%.: 170 mL  Total IN: 460 mL    OUT:    Chest Tube: 60 mL    Indwelling Catheter - Urethral: 20 mL  Total OUT: 80 mL    Total NET: 380 mL        ============================ LABS =========================                        11.0   10.7  )-----------( 115      ( 24 Sep 2019 00:48 )             33.3     09-24    136  |  102  |  42<H>  ----------------------------<  208<H>  5.8<H>   |  19<L>  |  5.44<H>    Ca    8.8      24 Sep 2019 00:48  Phos  2.6     09-24  Mg     1.8     09-24    TPro  5.1<L>  /  Alb  2.7<L>  /  TBili  0.3  /  DBili  x   /  AST  47<H>  /  ALT  29  /  AlkPhos  40  09-24    LIVER FUNCTIONS - ( 24 Sep 2019 00:48 )  Alb: 2.7 g/dL / Pro: 5.1 g/dL / ALK PHOS: 40 U/L / ALT: 29 U/L / AST: 47 U/L / GGT: x           PT/INR - ( 24 Sep 2019 00:48 )   PT: 13.5 sec;   INR: 1.18 ratio  PTT - ( 24 Sep 2019 00:48 )  PTT:27.0 sec  ABG - ( 24 Sep 2019 21:55 )  pH, Arterial: 7.42  pH, Blood: x     /  pCO2: 40    /  pO2: 75    / HCO3: 25    / Base Excess: 1.2   /  SaO2: 94        CXR: Reviewed  Echo:Reviewed  ======================================================  PAST MEDICAL & SURGICAL HISTORY:  ESRD (end stage renal disease) on dialysis: T-Th-Sat  Stented coronary artery: total 15 stents from 8675-6370  Hyperthyroidism  H/O: CVA: after cardiac stent placed 12/15/09 -no residual  Heart Attack: 2/1/07 with subsequent stent  Coronary Stent: 9600-2548 10 stents,  to Ramus 1/2015, cath 01/2016 ( see results in HPI)  Hypercholesterolemia  CAD (Coronary Artery Disease)  DM (Diabetes Mellitus): x 4 yrs without N/N/R  HTN (Hypertension)  Hemodialysis access, AV graft: 5/2015, L arm  S/P cataract extraction  Boil of Buttock: 2006 and 2008    ====================ASSESMENT ==============  9/23/2019 CABG C3L  CAD/ASHD  Hypotension  HTN  ESRD/HD  DM2  HLD  Hyperthyroism                                                                                                                                                                                                                                                                                                                                                                                                                                                                                                                                                                                                                                                                                                                                           h/o CVA    Plan:  ====================== NEUROLOGY=====================  aspirin Suppository 300 milliGRAM(s) Rectal once  metoclopramide Injectable 10 milliGRAM(s) IV Push every 8 hours  oxyCODONE    5 mG/acetaminophen 325 mG 1 Tablet(s) Oral every 6 hours PRN Mild Pain (1 - 3)  oxyCODONE    5 mG/acetaminophen 325 mG 2 Tablet(s) Oral every 6 hours PRN Moderate Pain (4 - 6)    ==================== RESPIRATORY======================  extubated , ON NC o2 supplement  ALBUTerol/ipratropium for Nebulization 3 milliLiter(s) Nebulizer every 6 hours PRN Shortness of Breath and/or Wheezing  tiotropium 18 MICROgram(s) Capsule 1 Capsule(s) Inhalation daily    ====================CARDIOVASCULAR==================  d/c DOBUTamine Infusion 2.5 MICROgram(s)/kG/Min (6.157 mL/Hr) IV Continuous <Continuous>  labetalol 200 milliGRAM(s) Oral every 12 hours  labetalol iv for bp control  ===================HEMATOLOGIC/ONC ===================  aspirin enteric coated 81 milliGRAM(s) Oral daily  heparin  Injectable 5000 Unit(s) SubCutaneous every 8 hours    ===================== RENAL =========================  HD today , was not completed due to hypotension; k =5; need HD in Am  ==================== GASTROINTESTINAL===================  dextrose 5%. 1000 milliLiter(s) (15 mL/Hr) IV Continuous <Continuous>  docusate sodium 100 milliGRAM(s) Oral three times a day  famotidine Injectable 20 milliGRAM(s) IV Push every 24 hours  sodium chloride 0.9% lock flush 3 milliLiter(s) IV Push every 8 hours  sodium chloride 0.9%. 1000 milliLiter(s) (10 mL/Hr) IV Continuous <Continuous>    =======================    ENDOCRINE  =====================  insulin regular Infusion 3 Unit(s)/Hr (3 mL/Hr) IV Continuous <Continuous>  methimazole 5 milliGRAM(s) Oral daily  simvastatin 40 milliGRAM(s) Oral at bedtime    ========================INFECTIOUS DISEASE================  cefuroxime  IVPB 1500 milliGRAM(s) IV Intermittent every 24 hours    Patient requires continuous monitoring with bedside rhythm monitoring, arterial line, pulse oximetry, ;intermittent blood gas analysis.  Care plan discussed with ICU care team.  patient remain critical; required more than usual post op care; I have spent 35 minutes providing non routine post op care.

## 2019-09-24 NOTE — PROGRESS NOTE ADULT - ASSESSMENT
69 y.o. male with pmh of ESRD( HD on tu/Th/sa) at [facility] via LUE AVF. Last dialyzed yesterday 9/14, was sent to [facility] for cath after presenting with SOB and a positive stress tests/p CABG 9/23/19 ;Renal following for ESRD Mx.    labs, chart reviewed

## 2019-09-24 NOTE — PHYSICAL THERAPY INITIAL EVALUATION ADULT - PERTINENT HX OF CURRENT PROBLEM, REHAB EVAL
69 y.o. male with pmh of ESRD( HD on tu/Th/sa) Last dialyzed yesterday 9/15, was sent to [facility] for cath after presenting with SOB and a positive stress test. s/p elective cardiac catheterization which revealed CAD. Pt t/f to [facility] for possible intervention.
69 y.o. male with pmh of ESRD( HD on tu/Th/sa) Last dialyzed yesterday 9/15, was sent to [facility] for cath after presenting with SOB and a positive stress test. s/p elective cardiac catheterization which revealed CAD. Pt s/p C3L 9/23

## 2019-09-24 NOTE — PHYSICAL THERAPY INITIAL EVALUATION ADULT - PLANNED THERAPY INTERVENTIONS, PT EVAL
transfer training/GOAL: Pt will negotiate 4 steps with unilateral handrail in a step-to pattern independently within 2 weeks/strengthening/gait training/balance training/bed mobility training

## 2019-09-24 NOTE — PROGRESS NOTE ADULT - SUBJECTIVE AND OBJECTIVE BOX
Patient seen and examined  no complaints  s/p cabg 9/23    lisinopril (Other)    Hospital Medications:   MEDICATIONS  (STANDING):  aspirin enteric coated 81 milliGRAM(s) Oral daily  aspirin Suppository 300 milliGRAM(s) Rectal once  cefuroxime  IVPB 1500 milliGRAM(s) IV Intermittent every 24 hours  chlorhexidine 2% Cloths 1 Application(s) Topical <User Schedule>  dextrose 5%. 1000 milliLiter(s) (15 mL/Hr) IV Continuous <Continuous>  dextrose 50% Injectable 50 milliLiter(s) IV Push every 15 minutes  dextrose 50% Injectable 25 milliLiter(s) IV Push every 15 minutes  DOBUTamine Infusion 2.5 MICROgram(s)/kG/Min (6.157 mL/Hr) IV Continuous <Continuous>  docusate sodium 100 milliGRAM(s) Oral three times a day  famotidine Injectable 20 milliGRAM(s) IV Push every 24 hours  heparin  Injectable 5000 Unit(s) SubCutaneous every 8 hours  insulin regular Infusion 3 Unit(s)/Hr (3 mL/Hr) IV Continuous <Continuous>  ketotifen 0.025% Ophthalmic Solution 1 Drop(s) Both EYES two times a day  methimazole 5 milliGRAM(s) Oral daily  metoclopramide Injectable 10 milliGRAM(s) IV Push every 8 hours  simvastatin 40 milliGRAM(s) Oral at bedtime  sodium chloride 0.9% lock flush 3 milliLiter(s) IV Push every 8 hours  sodium chloride 0.9%. 1000 milliLiter(s) (10 mL/Hr) IV Continuous <Continuous>  tiotropium 18 MICROgram(s) Capsule 1 Capsule(s) Inhalation daily        VITALS:  T(F): 98.8 (09-24-19 @ 12:00), Max: 99.9 (09-24-19 @ 00:00)  HR: 91 (09-24-19 @ 13:00)  BP: 137/73 (09-24-19 @ 12:00)  RR: 23 (09-24-19 @ 13:00)  SpO2: 94% (09-24-19 @ 13:00)  Wt(kg): --    09-23 @ 07:01  -  09-24 @ 07:00  --------------------------------------------------------  IN: 627.1 mL / OUT: 345 mL / NET: 282.1 mL    09-24 @ 07:01  -  09-24 @ 13:20  --------------------------------------------------------  IN: 100.5 mL / OUT: 55 mL / NET: 45.5 mL      PHYSICAL EXAM:  Constitutional: NAD  HEENT: anicteric sclera, oropharynx clear  Neck: No JVD  Respiratory: CTAB, no wheezes, rales or rhonchi  Cardiovascular: S1, S2, RRR  Gastrointestinal: BS+, soft, NT/ND  Extremities: No cyanosis or clubbing. No peripheral edema  Neurological: A/O x 3, no focal deficits  Psychiatric: Normal mood, normal affect  :  No wagner.   Vascular Access: WES AVF+thrill    LABS:  09-24    136  |  102  |  42<H>  ----------------------------<  208<H>  5.8<H>   |  19<L>  |  5.44<H>    Ca    8.8      24 Sep 2019 00:48  Phos  2.6     09-24  Mg     1.8     09-24    TPro  5.1<L>  /  Alb  2.7<L>  /  TBili  0.3  /  DBili      /  AST  47<H>  /  ALT  29  /  AlkPhos  40  09-24    Creatinine Trend: 5.44 <--, 4.38 <--, 5.34 <--, 7.49 <--, 5.55 <--, 8.20 <--, 6.59 <--                        11.0   10.7  )-----------( 115      ( 24 Sep 2019 00:48 )             33.3     Urine Studies:      RADIOLOGY & ADDITIONAL STUDIES:

## 2019-09-24 NOTE — PROGRESS NOTE ADULT - ASSESSMENT
Assessment  DMT2: 69y Male with DM T2 with hyperglycemia, was on oral meds at home, now on insulin drip, blood sugars trending within acceptable range, no hypoglycemic episode, postop CABG recovering in CTU.  CAD: Postop CABG 9/23, on medications, no chest pain, stable, monitored.  HTN: Controlled,  on antihypertensive medications.  Hyperthyroidism: On low dose Methimazole. New TFTs WNL: TSH 1.48, Free T4 1.3.  ESRD: Monitor labs/BMP            Yg Mackey MD  Cell: [phone]  Office: [phone] Assessment  DMT2: 69y Male with DM T2 with hyperglycemia, was on oral meds at home, now on insulin drip, blood sugars  trending within acceptable range, no hypoglycemic episode, postop CABG recovering in CTU.  CAD: Postop CABG 9/23, on medications, no chest pain, stable, monitored.  HTN: Controlled,  on antihypertensive medications.  Hyperthyroidism: On low dose Methimazole. New TFTs WNL: TSH 1.48, Free T4 1.3.  ESRD: Monitor labs/BMP            Yg Mackey MD  Cell: [phone]  Office: [phone]

## 2019-09-25 ENCOUNTER — APPOINTMENT (OUTPATIENT)
Dept: ENDOVASCULAR SURGERY | Facility: CLINIC | Age: 69
End: 2019-09-25

## 2019-09-25 LAB
ALBUMIN SERPL ELPH-MCNC: 3.1 G/DL — LOW (ref 3.3–5)
ALP SERPL-CCNC: 47 U/L — SIGNIFICANT CHANGE UP (ref 40–120)
ALT FLD-CCNC: 31 U/L — SIGNIFICANT CHANGE UP (ref 10–45)
ANION GAP SERPL CALC-SCNC: 18 MMOL/L — HIGH (ref 5–17)
AST SERPL-CCNC: 29 U/L — SIGNIFICANT CHANGE UP (ref 10–40)
BILIRUB SERPL-MCNC: 0.3 MG/DL — SIGNIFICANT CHANGE UP (ref 0.2–1.2)
BUN SERPL-MCNC: 48 MG/DL — HIGH (ref 7–23)
CALCIUM SERPL-MCNC: 9.1 MG/DL — SIGNIFICANT CHANGE UP (ref 8.4–10.5)
CHLORIDE SERPL-SCNC: 95 MMOL/L — LOW (ref 96–108)
CO2 SERPL-SCNC: 22 MMOL/L — SIGNIFICANT CHANGE UP (ref 22–31)
CREAT SERPL-MCNC: 6.23 MG/DL — HIGH (ref 0.5–1.3)
GAS PNL BLDA: SIGNIFICANT CHANGE UP
GLUCOSE BLDC GLUCOMTR-MCNC: 102 MG/DL — HIGH (ref 70–99)
GLUCOSE BLDC GLUCOMTR-MCNC: 126 MG/DL — HIGH (ref 70–99)
GLUCOSE BLDC GLUCOMTR-MCNC: 132 MG/DL — HIGH (ref 70–99)
GLUCOSE BLDC GLUCOMTR-MCNC: 136 MG/DL — HIGH (ref 70–99)
GLUCOSE BLDC GLUCOMTR-MCNC: 138 MG/DL — HIGH (ref 70–99)
GLUCOSE BLDC GLUCOMTR-MCNC: 142 MG/DL — HIGH (ref 70–99)
GLUCOSE BLDC GLUCOMTR-MCNC: 288 MG/DL — HIGH (ref 70–99)
GLUCOSE BLDC GLUCOMTR-MCNC: 289 MG/DL — HIGH (ref 70–99)
GLUCOSE SERPL-MCNC: 143 MG/DL — HIGH (ref 70–99)
HCT VFR BLD CALC: 31.2 % — LOW (ref 39–50)
HGB BLD-MCNC: 10.1 G/DL — LOW (ref 13–17)
MAGNESIUM SERPL-MCNC: 2 MG/DL — SIGNIFICANT CHANGE UP (ref 1.6–2.6)
MCHC RBC-ENTMCNC: 29.3 PG — SIGNIFICANT CHANGE UP (ref 27–34)
MCHC RBC-ENTMCNC: 32.3 GM/DL — SIGNIFICANT CHANGE UP (ref 32–36)
MCV RBC AUTO: 90.9 FL — SIGNIFICANT CHANGE UP (ref 80–100)
PHOSPHATE SERPL-MCNC: 6.4 MG/DL — HIGH (ref 2.5–4.5)
PLATELET # BLD AUTO: 128 K/UL — LOW (ref 150–400)
POTASSIUM SERPL-MCNC: 5.3 MMOL/L — SIGNIFICANT CHANGE UP (ref 3.5–5.3)
POTASSIUM SERPL-SCNC: 5.3 MMOL/L — SIGNIFICANT CHANGE UP (ref 3.5–5.3)
PROT SERPL-MCNC: 5.8 G/DL — LOW (ref 6–8.3)
RBC # BLD: 3.43 M/UL — LOW (ref 4.2–5.8)
RBC # FLD: 11.9 % — SIGNIFICANT CHANGE UP (ref 10.3–14.5)
SODIUM SERPL-SCNC: 135 MMOL/L — SIGNIFICANT CHANGE UP (ref 135–145)
WBC # BLD: 17.5 K/UL — HIGH (ref 3.8–10.5)
WBC # FLD AUTO: 17.5 K/UL — HIGH (ref 3.8–10.5)

## 2019-09-25 PROCEDURE — 93010 ELECTROCARDIOGRAM REPORT: CPT

## 2019-09-25 PROCEDURE — 71045 X-RAY EXAM CHEST 1 VIEW: CPT | Mod: 26

## 2019-09-25 PROCEDURE — 99291 CRITICAL CARE FIRST HOUR: CPT

## 2019-09-25 RX ORDER — VASOPRESSIN 20 [USP'U]/ML
0.03 INJECTION INTRAVENOUS
Qty: 50 | Refills: 0 | Status: DISCONTINUED | OUTPATIENT
Start: 2019-09-25 | End: 2019-09-26

## 2019-09-25 RX ORDER — DEXTROSE 50 % IN WATER 50 %
15 SYRINGE (ML) INTRAVENOUS ONCE
Refills: 0 | Status: DISCONTINUED | OUTPATIENT
Start: 2019-09-25 | End: 2019-09-25

## 2019-09-25 RX ORDER — INSULIN LISPRO 100/ML
6 VIAL (ML) SUBCUTANEOUS
Refills: 0 | Status: DISCONTINUED | OUTPATIENT
Start: 2019-09-25 | End: 2019-09-26

## 2019-09-25 RX ORDER — AMIODARONE HYDROCHLORIDE 400 MG/1
150 TABLET ORAL ONCE
Refills: 0 | Status: COMPLETED | OUTPATIENT
Start: 2019-09-25 | End: 2019-09-25

## 2019-09-25 RX ORDER — AMIODARONE HYDROCHLORIDE 400 MG/1
TABLET ORAL
Refills: 0 | Status: DISCONTINUED | OUTPATIENT
Start: 2019-09-25 | End: 2019-10-06

## 2019-09-25 RX ORDER — AMIODARONE HYDROCHLORIDE 400 MG/1
400 TABLET ORAL EVERY 8 HOURS
Refills: 0 | Status: COMPLETED | OUTPATIENT
Start: 2019-09-25 | End: 2019-09-29

## 2019-09-25 RX ORDER — SODIUM CHLORIDE 9 MG/ML
250 INJECTION INTRAMUSCULAR; INTRAVENOUS; SUBCUTANEOUS ONCE
Refills: 0 | Status: COMPLETED | OUTPATIENT
Start: 2019-09-25 | End: 2019-09-25

## 2019-09-25 RX ORDER — INSULIN GLARGINE 100 [IU]/ML
10 INJECTION, SOLUTION SUBCUTANEOUS AT BEDTIME
Refills: 0 | Status: DISCONTINUED | OUTPATIENT
Start: 2019-09-25 | End: 2019-09-26

## 2019-09-25 RX ORDER — ACETAMINOPHEN 500 MG
1000 TABLET ORAL ONCE
Refills: 0 | Status: COMPLETED | OUTPATIENT
Start: 2019-09-25 | End: 2019-09-25

## 2019-09-25 RX ORDER — GLUCAGON INJECTION, SOLUTION 0.5 MG/.1ML
1 INJECTION, SOLUTION SUBCUTANEOUS ONCE
Refills: 0 | Status: DISCONTINUED | OUTPATIENT
Start: 2019-09-25 | End: 2019-10-06

## 2019-09-25 RX ORDER — MIDODRINE HYDROCHLORIDE 2.5 MG/1
5 TABLET ORAL EVERY 8 HOURS
Refills: 0 | Status: DISCONTINUED | OUTPATIENT
Start: 2019-09-25 | End: 2019-09-26

## 2019-09-25 RX ORDER — INSULIN LISPRO 100/ML
VIAL (ML) SUBCUTANEOUS
Refills: 0 | Status: DISCONTINUED | OUTPATIENT
Start: 2019-09-25 | End: 2019-09-25

## 2019-09-25 RX ORDER — MAGNESIUM SULFATE 500 MG/ML
1 VIAL (ML) INJECTION ONCE
Refills: 0 | Status: COMPLETED | OUTPATIENT
Start: 2019-09-25 | End: 2019-09-25

## 2019-09-25 RX ORDER — DEXTROSE 50 % IN WATER 50 %
12.5 SYRINGE (ML) INTRAVENOUS ONCE
Refills: 0 | Status: DISCONTINUED | OUTPATIENT
Start: 2019-09-25 | End: 2019-09-25

## 2019-09-25 RX ORDER — SODIUM CHLORIDE 9 MG/ML
1000 INJECTION, SOLUTION INTRAVENOUS
Refills: 0 | Status: DISCONTINUED | OUTPATIENT
Start: 2019-09-25 | End: 2019-10-06

## 2019-09-25 RX ORDER — INSULIN LISPRO 100/ML
VIAL (ML) SUBCUTANEOUS
Refills: 0 | Status: DISCONTINUED | OUTPATIENT
Start: 2019-09-25 | End: 2019-09-26

## 2019-09-25 RX ORDER — AMIODARONE HYDROCHLORIDE 400 MG/1
200 TABLET ORAL DAILY
Refills: 0 | Status: DISCONTINUED | OUTPATIENT
Start: 2019-09-29 | End: 2019-10-06

## 2019-09-25 RX ADMIN — Medication 16: at 21:51

## 2019-09-25 RX ADMIN — SODIUM CHLORIDE 3 MILLILITER(S): 9 INJECTION INTRAMUSCULAR; INTRAVENOUS; SUBCUTANEOUS at 13:36

## 2019-09-25 RX ADMIN — HEPARIN SODIUM 5000 UNIT(S): 5000 INJECTION INTRAVENOUS; SUBCUTANEOUS at 13:36

## 2019-09-25 RX ADMIN — SODIUM CHLORIDE 3 MILLILITER(S): 9 INJECTION INTRAMUSCULAR; INTRAVENOUS; SUBCUTANEOUS at 21:52

## 2019-09-25 RX ADMIN — Medication 100 MILLIGRAM(S): at 13:36

## 2019-09-25 RX ADMIN — AMIODARONE HYDROCHLORIDE 600 MILLIGRAM(S): 400 TABLET ORAL at 14:09

## 2019-09-25 RX ADMIN — AMIODARONE HYDROCHLORIDE 400 MILLIGRAM(S): 400 TABLET ORAL at 14:58

## 2019-09-25 RX ADMIN — Medication 6: at 12:18

## 2019-09-25 RX ADMIN — SODIUM CHLORIDE 3 MILLILITER(S): 9 INJECTION INTRAMUSCULAR; INTRAVENOUS; SUBCUTANEOUS at 05:03

## 2019-09-25 RX ADMIN — Medication 2: at 18:45

## 2019-09-25 RX ADMIN — KETOTIFEN FUMARATE 1 DROP(S): 0.34 SOLUTION OPHTHALMIC at 05:28

## 2019-09-25 RX ADMIN — Medication 100 MILLIGRAM(S): at 08:59

## 2019-09-25 RX ADMIN — HEPARIN SODIUM 5000 UNIT(S): 5000 INJECTION INTRAVENOUS; SUBCUTANEOUS at 21:01

## 2019-09-25 RX ADMIN — Medication 1000 MILLIGRAM(S): at 10:56

## 2019-09-25 RX ADMIN — Medication 10 MILLIGRAM(S): at 05:27

## 2019-09-25 RX ADMIN — Medication 400 MILLIGRAM(S): at 10:06

## 2019-09-25 RX ADMIN — CHLORHEXIDINE GLUCONATE 1 APPLICATION(S): 213 SOLUTION TOPICAL at 05:28

## 2019-09-25 RX ADMIN — SODIUM CHLORIDE 1500 MILLILITER(S): 9 INJECTION INTRAMUSCULAR; INTRAVENOUS; SUBCUTANEOUS at 18:45

## 2019-09-25 RX ADMIN — Medication 200 MILLIGRAM(S): at 05:27

## 2019-09-25 RX ADMIN — OXYCODONE AND ACETAMINOPHEN 1 TABLET(S): 5; 325 TABLET ORAL at 08:00

## 2019-09-25 RX ADMIN — Medication 100 MILLIGRAM(S): at 05:27

## 2019-09-25 RX ADMIN — Medication 81 MILLIGRAM(S): at 11:53

## 2019-09-25 RX ADMIN — Medication 200 MILLIGRAM(S): at 17:21

## 2019-09-25 RX ADMIN — SIMVASTATIN 40 MILLIGRAM(S): 20 TABLET, FILM COATED ORAL at 21:01

## 2019-09-25 RX ADMIN — Medication 100 MILLIGRAM(S): at 21:01

## 2019-09-25 RX ADMIN — KETOTIFEN FUMARATE 1 DROP(S): 0.34 SOLUTION OPHTHALMIC at 17:20

## 2019-09-25 RX ADMIN — Medication 100 GRAM(S): at 17:18

## 2019-09-25 RX ADMIN — Medication 10 MILLIGRAM(S): at 13:35

## 2019-09-25 RX ADMIN — OXYCODONE AND ACETAMINOPHEN 1 TABLET(S): 5; 325 TABLET ORAL at 08:49

## 2019-09-25 RX ADMIN — SODIUM CHLORIDE 10 MILLILITER(S): 9 INJECTION INTRAMUSCULAR; INTRAVENOUS; SUBCUTANEOUS at 09:40

## 2019-09-25 RX ADMIN — FAMOTIDINE 20 MILLIGRAM(S): 10 INJECTION INTRAVENOUS at 18:44

## 2019-09-25 RX ADMIN — INSULIN GLARGINE 10 UNIT(S): 100 INJECTION, SOLUTION SUBCUTANEOUS at 22:51

## 2019-09-25 RX ADMIN — HEPARIN SODIUM 5000 UNIT(S): 5000 INJECTION INTRAVENOUS; SUBCUTANEOUS at 05:27

## 2019-09-25 RX ADMIN — AMIODARONE HYDROCHLORIDE 400 MILLIGRAM(S): 400 TABLET ORAL at 21:01

## 2019-09-25 RX ADMIN — AMIODARONE HYDROCHLORIDE 600 MILLIGRAM(S): 400 TABLET ORAL at 17:12

## 2019-09-25 RX ADMIN — MIDODRINE HYDROCHLORIDE 5 MILLIGRAM(S): 2.5 TABLET ORAL at 21:01

## 2019-09-25 NOTE — PROGRESS NOTE ADULT - SUBJECTIVE AND OBJECTIVE BOX
Chief complaint  Patient is a 69y old  Male who presents with a chief complaint of CABG (24 Sep 2019 23:40)   Review of systems  Patient in bed, looks comfortable, no fever, no hypoglycemia.    Labs and Fingersticks  CAPILLARY BLOOD GLUCOSE  148 (25 Sep 2019 08:00)  136 (25 Sep 2019 07:00)  132 (25 Sep 2019 06:00)  122 (25 Sep 2019 05:00)  102 (25 Sep 2019 04:00)  126 (25 Sep 2019 03:00)  135 (25 Sep 2019 02:00)  138 (25 Sep 2019 01:00)  142 (25 Sep 2019 00:00)  122 (24 Sep 2019 23:00)  101 (24 Sep 2019 22:00)  120 (24 Sep 2019 21:00)  166 (24 Sep 2019 20:00)  160 (24 Sep 2019 19:00)  128 (24 Sep 2019 18:00)  137 (24 Sep 2019 17:00)  155 (24 Sep 2019 16:00)  163 (24 Sep 2019 15:00)  139 (24 Sep 2019 14:00)      POCT Blood Glucose.: 288 mg/dL (25 Sep 2019 12:13)  POCT Blood Glucose.: 136 mg/dL (25 Sep 2019 06:53)  POCT Blood Glucose.: 132 mg/dL (25 Sep 2019 06:12)  POCT Blood Glucose.: 102 mg/dL (25 Sep 2019 04:17)  POCT Blood Glucose.: 126 mg/dL (25 Sep 2019 02:57)  POCT Blood Glucose.: 138 mg/dL (25 Sep 2019 01:14)  POCT Blood Glucose.: 142 mg/dL (25 Sep 2019 00:13)  POCT Blood Glucose.: 122 mg/dL (24 Sep 2019 22:54)  POCT Blood Glucose.: 120 mg/dL (24 Sep 2019 21:04)  POCT Blood Glucose.: 166 mg/dL (24 Sep 2019 19:54)  POCT Blood Glucose.: 160 mg/dL (24 Sep 2019 18:57)  POCT Blood Glucose.: 137 mg/dL (24 Sep 2019 16:58)  POCT Blood Glucose.: 155 mg/dL (24 Sep 2019 15:56)  POCT Blood Glucose.: 163 mg/dL (24 Sep 2019 15:08)  POCT Blood Glucose.: 136 mg/dL (24 Sep 2019 13:04)      Anion Gap, Serum: 18 <H> (09-25 @ 02:14)  Anion Gap, Serum: 15 (09-24 @ 00:48)  Anion Gap, Serum: 11 (09-23 @ 13:52)      Calcium, Total Serum: 9.1 (09-25 @ 02:14)  Calcium, Total Serum: 8.8 (09-24 @ 00:48)  Calcium, Total Serum: 8.5 (09-23 @ 13:52)  Albumin, Serum: 3.1 <L> (09-25 @ 02:14)  Albumin, Serum: 2.7 <L> (09-24 @ 00:48)  Albumin, Serum: 2.8 <L> (09-23 @ 13:52)    Alanine Aminotransferase (ALT/SGPT): 31 (09-25 @ 02:14)  Alanine Aminotransferase (ALT/SGPT): 29 (09-24 @ 00:48)  Alanine Aminotransferase (ALT/SGPT): 23 (09-23 @ 13:52)  Alkaline Phosphatase, Serum: 47 (09-25 @ 02:14)  Alkaline Phosphatase, Serum: 40 (09-24 @ 00:48)  Alkaline Phosphatase, Serum: 42 (09-23 @ 13:52)  Aspartate Aminotransferase (AST/SGOT): 29 (09-25 @ 02:14)  Aspartate Aminotransferase (AST/SGOT): 47 <H> (09-24 @ 00:48)  Aspartate Aminotransferase (AST/SGOT): 48 <H> (09-23 @ 13:52)        09-25    135  |  95<L>  |  48<H>  ----------------------------<  143<H>  5.3   |  22  |  6.23<H>    Ca    9.1      25 Sep 2019 02:14  Phos  6.4     09-25  Mg     2.0     09-25    TPro  5.8<L>  /  Alb  3.1<L>  /  TBili  0.3  /  DBili  x   /  AST  29  /  ALT  31  /  AlkPhos  47  09-25                        10.1   17.5  )-----------( 128      ( 25 Sep 2019 02:14 )             31.2     Medications  MEDICATIONS  (STANDING):  aspirin enteric coated 81 milliGRAM(s) Oral daily  chlorhexidine 2% Cloths 1 Application(s) Topical <User Schedule>  dextrose 5%. 1000 milliLiter(s) (50 mL/Hr) IV Continuous <Continuous>  dextrose 5%. 1000 milliLiter(s) (15 mL/Hr) IV Continuous <Continuous>  dextrose 50% Injectable 12.5 Gram(s) IV Push once  docusate sodium 100 milliGRAM(s) Oral three times a day  famotidine Injectable 20 milliGRAM(s) IV Push every 24 hours  heparin  Injectable 5000 Unit(s) SubCutaneous every 8 hours  insulin lispro (HumaLOG) corrective regimen sliding scale   SubCutaneous Before meals and at bedtime  ketotifen 0.025% Ophthalmic Solution 1 Drop(s) Both EYES two times a day  labetalol 200 milliGRAM(s) Oral every 12 hours  methimazole 5 milliGRAM(s) Oral daily  metoclopramide Injectable 10 milliGRAM(s) IV Push every 8 hours  simvastatin 40 milliGRAM(s) Oral at bedtime  sodium chloride 0.9% lock flush 3 milliLiter(s) IV Push every 8 hours  sodium chloride 0.9%. 1000 milliLiter(s) (10 mL/Hr) IV Continuous <Continuous>  tiotropium 18 MICROgram(s) Capsule 1 Capsule(s) Inhalation daily      Physical Exam  General: Patient comfortable in bed  Vital Signs Last 12 Hrs  T(F): 98 (09-25-19 @ 12:00), Max: 98 (09-25-19 @ 12:00)  HR: 85 (09-25-19 @ 12:14) (79 - 88)  BP: --  BP(mean): --  RR: 25 (09-25-19 @ 12:00) (18 - 25)  SpO2: 95% (09-25-19 @ 12:14) (95% - 98%)  Neck: No palpable thyroid nodules.  CVS: S1S2, No murmurs  Respiratory: No wheezing, no crepitations  GI: Abdomen soft, bowel sounds positive  Musculoskeletal:  edema lower extremities.   Skin: No skin rashes, no ecchymosis    Diagnostics Chief complaint  Patient is a 69y old  Male who presents with a chief complaint of CABG (24 Sep 2019 23:40)   Review of systems  Patient in bed, looks comfortable,  no fever, no hypoglycemia.    Labs and Fingersticks  CAPILLARY BLOOD GLUCOSE  148 (25 Sep 2019 08:00)  136 (25 Sep 2019 07:00)  132 (25 Sep 2019 06:00)  122 (25 Sep 2019 05:00)  102 (25 Sep 2019 04:00)  126 (25 Sep 2019 03:00)  135 (25 Sep 2019 02:00)  138 (25 Sep 2019 01:00)  142 (25 Sep 2019 00:00)  122 (24 Sep 2019 23:00)  101 (24 Sep 2019 22:00)  120 (24 Sep 2019 21:00)  166 (24 Sep 2019 20:00)  160 (24 Sep 2019 19:00)  128 (24 Sep 2019 18:00)  137 (24 Sep 2019 17:00)  155 (24 Sep 2019 16:00)  163 (24 Sep 2019 15:00)  139 (24 Sep 2019 14:00)      POCT Blood Glucose.: 288 mg/dL (25 Sep 2019 12:13)  POCT Blood Glucose.: 136 mg/dL (25 Sep 2019 06:53)  POCT Blood Glucose.: 132 mg/dL (25 Sep 2019 06:12)  POCT Blood Glucose.: 102 mg/dL (25 Sep 2019 04:17)  POCT Blood Glucose.: 126 mg/dL (25 Sep 2019 02:57)  POCT Blood Glucose.: 138 mg/dL (25 Sep 2019 01:14)  POCT Blood Glucose.: 142 mg/dL (25 Sep 2019 00:13)  POCT Blood Glucose.: 122 mg/dL (24 Sep 2019 22:54)  POCT Blood Glucose.: 120 mg/dL (24 Sep 2019 21:04)  POCT Blood Glucose.: 166 mg/dL (24 Sep 2019 19:54)  POCT Blood Glucose.: 160 mg/dL (24 Sep 2019 18:57)  POCT Blood Glucose.: 137 mg/dL (24 Sep 2019 16:58)  POCT Blood Glucose.: 155 mg/dL (24 Sep 2019 15:56)  POCT Blood Glucose.: 163 mg/dL (24 Sep 2019 15:08)  POCT Blood Glucose.: 136 mg/dL (24 Sep 2019 13:04)      Anion Gap, Serum: 18 <H> (09-25 @ 02:14)  Anion Gap, Serum: 15 (09-24 @ 00:48)  Anion Gap, Serum: 11 (09-23 @ 13:52)      Calcium, Total Serum: 9.1 (09-25 @ 02:14)  Calcium, Total Serum: 8.8 (09-24 @ 00:48)  Calcium, Total Serum: 8.5 (09-23 @ 13:52)  Albumin, Serum: 3.1 <L> (09-25 @ 02:14)  Albumin, Serum: 2.7 <L> (09-24 @ 00:48)  Albumin, Serum: 2.8 <L> (09-23 @ 13:52)    Alanine Aminotransferase (ALT/SGPT): 31 (09-25 @ 02:14)  Alanine Aminotransferase (ALT/SGPT): 29 (09-24 @ 00:48)  Alanine Aminotransferase (ALT/SGPT): 23 (09-23 @ 13:52)  Alkaline Phosphatase, Serum: 47 (09-25 @ 02:14)  Alkaline Phosphatase, Serum: 40 (09-24 @ 00:48)  Alkaline Phosphatase, Serum: 42 (09-23 @ 13:52)  Aspartate Aminotransferase (AST/SGOT): 29 (09-25 @ 02:14)  Aspartate Aminotransferase (AST/SGOT): 47 <H> (09-24 @ 00:48)  Aspartate Aminotransferase (AST/SGOT): 48 <H> (09-23 @ 13:52)        09-25    135  |  95<L>  |  48<H>  ----------------------------<  143<H>  5.3   |  22  |  6.23<H>    Ca    9.1      25 Sep 2019 02:14  Phos  6.4     09-25  Mg     2.0     09-25    TPro  5.8<L>  /  Alb  3.1<L>  /  TBili  0.3  /  DBili  x   /  AST  29  /  ALT  31  /  AlkPhos  47  09-25                        10.1   17.5  )-----------( 128      ( 25 Sep 2019 02:14 )             31.2     Medications  MEDICATIONS  (STANDING):  aspirin enteric coated 81 milliGRAM(s) Oral daily  chlorhexidine 2% Cloths 1 Application(s) Topical <User Schedule>  dextrose 5%. 1000 milliLiter(s) (50 mL/Hr) IV Continuous <Continuous>  dextrose 5%. 1000 milliLiter(s) (15 mL/Hr) IV Continuous <Continuous>  dextrose 50% Injectable 12.5 Gram(s) IV Push once  docusate sodium 100 milliGRAM(s) Oral three times a day  famotidine Injectable 20 milliGRAM(s) IV Push every 24 hours  heparin  Injectable 5000 Unit(s) SubCutaneous every 8 hours  insulin lispro (HumaLOG) corrective regimen sliding scale   SubCutaneous Before meals and at bedtime  ketotifen 0.025% Ophthalmic Solution 1 Drop(s) Both EYES two times a day  labetalol 200 milliGRAM(s) Oral every 12 hours  methimazole 5 milliGRAM(s) Oral daily  metoclopramide Injectable 10 milliGRAM(s) IV Push every 8 hours  simvastatin 40 milliGRAM(s) Oral at bedtime  sodium chloride 0.9% lock flush 3 milliLiter(s) IV Push every 8 hours  sodium chloride 0.9%. 1000 milliLiter(s) (10 mL/Hr) IV Continuous <Continuous>  tiotropium 18 MICROgram(s) Capsule 1 Capsule(s) Inhalation daily      Physical Exam  General: Patient comfortable in bed  Vital Signs Last 12 Hrs  T(F): 98 (09-25-19 @ 12:00), Max: 98 (09-25-19 @ 12:00)  HR: 85 (09-25-19 @ 12:14) (79 - 88)  BP: --  BP(mean): --  RR: 25 (09-25-19 @ 12:00) (18 - 25)  SpO2: 95% (09-25-19 @ 12:14) (95% - 98%)  Neck: No palpable thyroid nodules.  CVS: S1S2, No murmurs  Respiratory: No wheezing, no crepitations  GI: Abdomen soft, bowel sounds positive  Musculoskeletal:  edema lower extremities.   Skin: No skin rashes, no ecchymosis    Diagnostics

## 2019-09-25 NOTE — PROGRESS NOTE ADULT - ASSESSMENT
Assessment  DMT2: 69y Male with DM T2 with hyperglycemia, was on oral meds at home, now on insulin coverage, blood sugars trending within acceptable range, no hypoglycemic episode, postop CABG recovering in CTU.  CAD: Postop CABG 9/23, on medications, no chest pain, stable, monitored.  HTN: Controlled,  on antihypertensive medications.  Hyperthyroidism: On low dose Methimazole. New TFTs WNL: TSH 1.48, Free T4 1.3.  ESRD: Monitor labs/BMP            Yg Mackey MD  Cell: [phone]  Office: [phone] Assessment  DMT2: 69y Male with DM T2 with hyperglycemia, was on oral meds at home, now on insulin coverage, blood sugars trending  within acceptable range, no hypoglycemic episode, postop CABG recovering in CTU.  CAD: Postop CABG 9/23, on medications, no chest pain, stable, monitored.  HTN: Controlled,  on antihypertensive medications.  Hyperthyroidism: On low dose Methimazole. New TFTs WNL: TSH 1.48, Free T4 1.3.  ESRD: Monitor labs/BMP            Yg Mackey MD  Cell: [phone]  Office: [phone]

## 2019-09-25 NOTE — PROGRESS NOTE ADULT - ASSESSMENT
69 y.o. male with pmh of ESRD( HD on tu/Th/sa) at [facility] via LUE AVF. Last dialyzed yesterday 9/14, was sent to [facility] for cath after presenting with SOB and a positive stress tests/p CABG 9/23/19 ;Renal following for ESRD Mx.    labs, chart reviewed

## 2019-09-25 NOTE — PROGRESS NOTE ADULT - PROBLEM SELECTOR PLAN 1
Will continue current insulin regimen for now. Once patient is eating full meals may start on basal bolus insulin. Will continue monitoring FS, log, and FU.  Patient counseled for compliance with consistent low carb diet. Will continue current insulin regimen for now. Once patient is eating full meals may start on basal bolus insulin. Will continue monitoring FS, log, and FU.

## 2019-09-25 NOTE — PROGRESS NOTE ADULT - SUBJECTIVE AND OBJECTIVE BOX
Patient seen and examined  no complaints    lisinopril (Other)    Hospital Medications:   MEDICATIONS  (STANDING):  aspirin enteric coated 81 milliGRAM(s) Oral daily  chlorhexidine 2% Cloths 1 Application(s) Topical <User Schedule>  dextrose 5%. 1000 milliLiter(s) (50 mL/Hr) IV Continuous <Continuous>  dextrose 5%. 1000 milliLiter(s) (15 mL/Hr) IV Continuous <Continuous>  dextrose 50% Injectable 12.5 Gram(s) IV Push once  docusate sodium 100 milliGRAM(s) Oral three times a day  famotidine Injectable 20 milliGRAM(s) IV Push every 24 hours  heparin  Injectable 5000 Unit(s) SubCutaneous every 8 hours  insulin lispro (HumaLOG) corrective regimen sliding scale   SubCutaneous Before meals and at bedtime  ketotifen 0.025% Ophthalmic Solution 1 Drop(s) Both EYES two times a day  labetalol 200 milliGRAM(s) Oral every 12 hours  methimazole 5 milliGRAM(s) Oral daily  simvastatin 40 milliGRAM(s) Oral at bedtime  sodium chloride 0.9% lock flush 3 milliLiter(s) IV Push every 8 hours  sodium chloride 0.9%. 1000 milliLiter(s) (10 mL/Hr) IV Continuous <Continuous>  tiotropium 18 MICROgram(s) Capsule 1 Capsule(s) Inhalation daily        VITALS:  T(F): 98 (09-25-19 @ 12:00), Max: 98.8 (09-24-19 @ 16:00)  HR: 89 (09-25-19 @ 13:00)  BP: --  RR: 25 (09-25-19 @ 13:00)  SpO2: 95% (09-25-19 @ 13:00)  Wt(kg): --    09-24 @ 07:01  -  09-25 @ 07:00  --------------------------------------------------------  IN: 540 mL / OUT: 150 mL / NET: 390 mL    09-25 @ 07:01  -  09-25 @ 13:47  --------------------------------------------------------  IN: 510 mL / OUT: 0 mL / NET: 510 mL      PHYSICAL EXAM:  Constitutional: NAD  HEENT: anicteric sclera, oropharynx clear  Neck: No JVD  Respiratory: CTAB, no wheezes, rales or rhonchi  Cardiovascular: S1, S2, RRR  Gastrointestinal: BS+, soft, NT/ND  Extremities: No cyanosis or clubbing. No peripheral edema  Neurological: A/O x 3, no focal deficits  Psychiatric: Normal mood, normal affect  :  No wagner.   Vascular Access: WES AVF+thrill    LABS:  09-25    135  |  95<L>  |  48<H>  ----------------------------<  143<H>  5.3   |  22  |  6.23<H>    Ca    9.1      25 Sep 2019 02:14  Phos  6.4     09-25  Mg     2.0     09-25    TPro  5.8<L>  /  Alb  3.1<L>  /  TBili  0.3  /  DBili      /  AST  29  /  ALT  31  /  AlkPhos  47  09-25    Creatinine Trend: 6.23 <--, 5.44 <--, 4.38 <--, 5.34 <--, 7.49 <--, 5.55 <--, 8.20 <--                        10.1   17.5  )-----------( 128      ( 25 Sep 2019 02:14 )             31.2     Urine Studies:      RADIOLOGY & ADDITIONAL STUDIES:

## 2019-09-25 NOTE — PROGRESS NOTE ADULT - SUBJECTIVE AND OBJECTIVE BOX
S: Comfortable on nasal cannula. No complaints. Denies chest pain or SOB. Review of systems otherwise (-)      MEDICATIONS  (STANDING):  aspirin enteric coated 81 milliGRAM(s) Oral daily  chlorhexidine 2% Cloths 1 Application(s) Topical <User Schedule>  dextrose 5%. 1000 milliLiter(s) (50 mL/Hr) IV Continuous <Continuous>  dextrose 5%. 1000 milliLiter(s) (15 mL/Hr) IV Continuous <Continuous>  dextrose 50% Injectable 12.5 Gram(s) IV Push once  docusate sodium 100 milliGRAM(s) Oral three times a day  famotidine Injectable 20 milliGRAM(s) IV Push every 24 hours  heparin  Injectable 5000 Unit(s) SubCutaneous every 8 hours  insulin lispro (HumaLOG) corrective regimen sliding scale   SubCutaneous Before meals and at bedtime  ketotifen 0.025% Ophthalmic Solution 1 Drop(s) Both EYES two times a day  labetalol 200 milliGRAM(s) Oral every 12 hours  methimazole 5 milliGRAM(s) Oral daily  metoclopramide Injectable 10 milliGRAM(s) IV Push every 8 hours  simvastatin 40 milliGRAM(s) Oral at bedtime  sodium chloride 0.9% lock flush 3 milliLiter(s) IV Push every 8 hours  sodium chloride 0.9%. 1000 milliLiter(s) (10 mL/Hr) IV Continuous <Continuous>  tiotropium 18 MICROgram(s) Capsule 1 Capsule(s) Inhalation daily    MEDICATIONS  (PRN):  ALBUTerol/ipratropium for Nebulization 3 milliLiter(s) Nebulizer every 6 hours PRN Shortness of Breath and/or Wheezing  dextrose 40% Gel 15 Gram(s) Oral once PRN Blood Glucose LESS THAN 70 milliGRAM(s)/deciLiter  glucagon  Injectable 1 milliGRAM(s) IntraMuscular once PRN Glucose <70 milliGRAM(s)/deciLiter  oxyCODONE    5 mG/acetaminophen 325 mG 1 Tablet(s) Oral every 6 hours PRN Mild Pain (1 - 3)  oxyCODONE    5 mG/acetaminophen 325 mG 2 Tablet(s) Oral every 6 hours PRN Moderate Pain (4 - 6)      LABS:                            10.1   17.5  )-----------( 128      ( 25 Sep 2019 02:14 )             31.2     Hemoglobin: 10.1 g/dL ( @ 02:14)  Hemoglobin: 11.0 g/dL ( @ 00:48)  Hemoglobin: 11.8 g/dL ( @ 13:52)  Hemoglobin: 12.1 g/dL ( @ 09:49)  Hemoglobin: 11.7 g/dL ( @ 13:49)        135  |  95<L>  |  48<H>  ----------------------------<  143<H>  5.3   |  22  |  6.23<H>    Ca    9.1      25 Sep 2019 02:14  Phos  6.4       Mg     2.0         TPro  5.8<L>  /  Alb  3.1<L>  /  TBili  0.3  /  DBili  x   /  AST  29  /  ALT  31  /  AlkPhos  47      Creatinine Trend: 6.23<--, 5.44<--, 4.38<--, 5.34<--, 7.49<--, 5.55<--     CARDIAC MARKERS ( 23 Sep 2019 13:52 )  x     / x     / 524 U/L / x     / 83.7 ng/mL          19 @ 07:01  -  19 @ 07:00  --------------------------------------------------------  IN: 540 mL / OUT: 150 mL / NET: 390 mL    19 @ 07:01  -  19 @ 12:58  --------------------------------------------------------  IN: 400 mL / OUT: 0 mL / NET: 400 mL        PHYSICAL EXAM  Vital Signs Last 24 Hrs  T(C): 36.7 (25 Sep 2019 12:00), Max: 37.1 (24 Sep 2019 16:00)  T(F): 98 (25 Sep 2019 12:00), Max: 98.8 (24 Sep 2019 16:00)  HR: 85 (25 Sep 2019 12:14) (79 - 95)  BP: --  BP(mean): --  RR: 25 (25 Sep 2019 12:00) (17 - 25)  SpO2: 95% (25 Sep 2019 12:14) (94% - 98%)        Gen: NAD  HEENT:  (-)icterus (-)pallor  CV: N S1 S2 1/6 DEJAH (+)2 Pulses B/l  Resp:  Coarse BS B/L, normal effort  GI: (+) BS Soft, NT, ND  Lymph:  (-)Edema, (-)obvious lymphadenopathy  Skin: Warm to touch, Normal turgor  Psych: Appropriate mood and affect    TELEMETRY: NSR 80s, PVC      < from: Cardiac Cath Lab - Adult (19 @ 12:28) >  PROCEDURE:  --  Left heart catheterization.  --  Left coronary angiography.  --  Right coronary angiography.  --  Sonosite - Diagnostic.  VENTRICLES: No LV gram was performed; however, a recent echocardiogram  demonstrated normal global and regional LV function.  CORONARY VESSELS: The coronary circulation is right dominant.  LM:   --  Ostial LM: There was a discrete 90 % stenosis.  LAD:   --  Mid LAD: Angiography showed minor luminal irregularities with no  flow limiting lesions. The stent in the mid LAD is patent.  --  Distal LAD: The vessel was small sized. Angiography showed moderate  atherosclerosis.  CX:   --  Mid circumflex: There was a tubular 50 % stenosis at the site of  a prior stent.  RI:   --  Proximal ramus intermedius: There was a diffuse 50 % stenosis at  the site of a prior stent.  RCA:   --  Mid RCA: There was a tubular 50 % stenosis at the site of a  prior stent.  --  RPLS: Angiography showed minor luminal irregularities with no flow  limiting lesions.  COMPLICATIONS: There were no complications.  DIAGNOSTIC RECOMMENDATIONS: Consultation with a cardiac surgeon be obtained  for surgical opinion and coronary artery bypass grafting.  Prepared and signed by  Jessy Méndez M.D.  Signed 2019 13:42:23  HEMODYNAMIC TABLES  Pressures:  NO PHASE  Pressures:  - HR: 69  Pressures:  - Rhythm:  Pressures:  -- Aortic Pressure (S/D/M): 170/70/113  Pressures:  -- Left Ventricle (s/edp): 164/28/--  Outputs:  NO PHASE  Outputs:  -- CALCUL[age]  Outputs:  -- CALCULATIONS: Body Surface Area: 1.92  Outputs:  -- CALCULATIONS: Height in cm: 168.00  Outputs:  -- CALCULATIONS: Sex: Male  Outputs:  -- CALCULATIONS: Weight in k.60  Outputs:  -- OUTPUTS: O2 consumption: 239.38  Outputs:  -- OUTPUTS: Vo2 Indexed: 125.00  < end of copied text >    ASSESSMENT/PLAN: 68 y/o male with PMHx of CAD, HTN, HLD, T2DM, and ESRD on HD presented after abnormal stress test to [facility] for elective cardiac catheterization. Patient admitted post cath after cath revealed severe ostial LM disease. Now transferred to [facility] for CABG. S/p C3L on .  	   - Continue to monitor tele  - Continue ASA, Statin  - Tolerating Labetalol  - Pain control  - HD per renal  - Supportive care per CTU appreciated    Wilfred Robin PA-C  [facility]  Pager: [phone]

## 2019-09-25 NOTE — DIETITIAN INITIAL EVALUATION ADULT. - ADD RECOMMEND
1. Change diet to consistent CHO, Renal. 2. Add Nepro x1 daily 3. 3.Provide food preferences as requested by Pt/family within diet restrictions  4. Encourage PO intake during meal times 5. Reviewed menu ordering procedures 6. T2MD and ESRD education reinforced.

## 2019-09-25 NOTE — DIETITIAN INITIAL EVALUATION ADULT. - PROBLEM SELECTOR PLAN 3
DMITRIY Loving, aware of HR and that pt is requesting to speak with her regarding d/c prescription. PA to come to speak with pt as soon as possible.   -Statin

## 2019-09-25 NOTE — DIETITIAN INITIAL EVALUATION ADULT. - OTHER INFO
Pt seen for LOS. Pt reports feeling well, states he ate well at breakfast this morning; about 75%.   Pt reports a good PO intake PTA, wife preforms food shopping and cooking. Pt consumes a diet low In sat and sugar.   Breakfast: whole wheat bread. Lunch: vegetables, rice and sometimes meat/chicken/fish/. Dinner: breads. Drinks water and sometimes accepts Nepro. Reports dry weight to be 180lbs and denies any fluctuations. Pt is current 195lbs likely related to intraoperative fluid shifts.   Pt has T2DM, Hgba1c is 6.4%, takes Januvia and monitors BG levels 2x daily. Pt also ESRD on HD x4 years, states he follows with RD at HD center for on going education. Pt accepted education on increased nutrient needs in the post op phase. Reviewed protein rich sources and encouraged adequate protein intake. Pt willing to accept Nepro x1 daily here. Reviewed T2DM and ESRD on HD education with Pt. Denies in depth education or written materials at this time. Aware RD remains available will follow up as able for on going education review. Pt denies GI distress, chewing/swallowing difficulty, micronutrient supplements. NKFA

## 2019-09-25 NOTE — PROGRESS NOTE ADULT - ATTENDING COMMENTS
Patient seen and examined.  Agree with above.   -continue with BIPAP support per MICU  -check TTE to evaluate LV function    Kalie Lau MD Agree with above  continue with medical management of cad  supportive care per CTU      Kalie Lau MD

## 2019-09-25 NOTE — PROGRESS NOTE ADULT - SUBJECTIVE AND OBJECTIVE BOX
CRITICAL CARE ATTENDING - CTICU    MEDICATIONS  (STANDING):  aspirin enteric coated 81 milliGRAM(s) Oral daily  chlorhexidine 2% Cloths 1 Application(s) Topical <User Schedule>  dextrose 5%. 1000 milliLiter(s) (50 mL/Hr) IV Continuous <Continuous>  dextrose 5%. 1000 milliLiter(s) (15 mL/Hr) IV Continuous <Continuous>  dextrose 50% Injectable 12.5 Gram(s) IV Push once  docusate sodium 100 milliGRAM(s) Oral three times a day  famotidine Injectable 20 milliGRAM(s) IV Push every 24 hours  heparin  Injectable 5000 Unit(s) SubCutaneous every 8 hours  insulin lispro (HumaLOG) corrective regimen sliding scale   SubCutaneous Before meals and at bedtime  ketotifen 0.025% Ophthalmic Solution 1 Drop(s) Both EYES two times a day  labetalol 200 milliGRAM(s) Oral every 12 hours  methimazole 5 milliGRAM(s) Oral daily  simvastatin 40 milliGRAM(s) Oral at bedtime  sodium chloride 0.9% lock flush 3 milliLiter(s) IV Push every 8 hours  sodium chloride 0.9%. 1000 milliLiter(s) (10 mL/Hr) IV Continuous <Continuous>  tiotropium 18 MICROgram(s) Capsule 1 Capsule(s) Inhalation daily                                    10.1   17.5  )-----------( 128      ( 25 Sep 2019 02:14 )             31.2           135  |  95<L>  |  48<H>  ----------------------------<  143<H>  5.3   |  22  |  6.23<H>    Ca    9.1      25 Sep 2019 02:14  Phos  6.4       Mg     2.0         TPro  5.8<L>  /  Alb  3.1<L>  /  TBili  0.3  /  DBili  x   /  AST  29  /  ALT  31  /  AlkPhos  47        PT/INR - ( 24 Sep 2019 00:48 )   PT: 13.5 sec;   INR: 1.18 ratio         PTT - ( 24 Sep 2019 00:48 )  PTT:27.0 sec        Daily     Daily Weight in k.9 (25 Sep 2019 13:31)       @ 07:01  -  09-25 @ 07:00  --------------------------------------------------------  IN: 540 mL / OUT: 150 mL / NET: 390 mL     @ 07:01  -   @ 14:16  --------------------------------------------------------  IN: 510 mL / OUT: 0 mL / NET: 510 mL        Critically Ill patient  : [ ] preoperative ,   [ x] post operative    Requires :  [x ] Arterial Line   [ x] Central Line  [ ] PA catheter  [ ] IABP  [ ] ECMO  [ ] LVAD  [ ] Ventilator  [x ] pacemaker [ ] Impella.                      [x ] ABG's     [x ] Pulse Oxymetry Monitoring  Bedside evaluation , monitoring , treatment of hemodynamics , fluids , IVP/ IVCD meds.        Diagnosis:     POD 2 - CABG x 3 L    Chronic Renal Failure - End Stage Renal Disease     hypotension    Hypovolemia     [x ] Hemodialysis [ ] Hemofiltration:    [ x] negative fluid balance [ ] even fluid balance [ ] positive fluid balance, in a hemodynamically unstable patient.     Thrombocytopenia     A Fib / Tachycardia     Hyperthyroid     Old CVA     DM     Hypotension a/w Hypertension, requiring intravenous medication.                         Discussed with CT surgeon, Physician's Assistant - Nurse Practitioner- Critical care medicine team.   Dicussed at  AM / PM rounds.   Chart, labs , films reviewed.    Total Time: 30 min

## 2019-09-25 NOTE — DIETITIAN INITIAL EVALUATION ADULT. - ENERGY NEEDS
Ht: 5'6", admission Wt: 184lbs, BMI: 29.6kg/m2, IBW: 142lbs(+/-10%), 129%IBW  Pertinent information: 69 year old male with PMHx of T2DM and ESRD on HD. Presented with SOB, and +stress test. S/p cardiac cath found with multi-vessel disease. POD 2 for CABG x3. Plan for HD today.   +1 generalized Edema, Skin: intact

## 2019-09-26 LAB
ALBUMIN SERPL ELPH-MCNC: 3.2 G/DL — LOW (ref 3.3–5)
ALP SERPL-CCNC: 51 U/L — SIGNIFICANT CHANGE UP (ref 40–120)
ALT FLD-CCNC: 24 U/L — SIGNIFICANT CHANGE UP (ref 10–45)
ANION GAP SERPL CALC-SCNC: 13 MMOL/L — SIGNIFICANT CHANGE UP (ref 5–17)
AST SERPL-CCNC: 15 U/L — SIGNIFICANT CHANGE UP (ref 10–40)
BILIRUB SERPL-MCNC: 0.2 MG/DL — SIGNIFICANT CHANGE UP (ref 0.2–1.2)
BUN SERPL-MCNC: 40 MG/DL — HIGH (ref 7–23)
CALCIUM SERPL-MCNC: 8.4 MG/DL — SIGNIFICANT CHANGE UP (ref 8.4–10.5)
CHLORIDE SERPL-SCNC: 94 MMOL/L — LOW (ref 96–108)
CO2 SERPL-SCNC: 26 MMOL/L — SIGNIFICANT CHANGE UP (ref 22–31)
CREAT SERPL-MCNC: 5.36 MG/DL — HIGH (ref 0.5–1.3)
GAS PNL BLDA: SIGNIFICANT CHANGE UP
GLUCOSE BLDC GLUCOMTR-MCNC: 133 MG/DL — HIGH (ref 70–99)
GLUCOSE BLDC GLUCOMTR-MCNC: 157 MG/DL — HIGH (ref 70–99)
GLUCOSE BLDC GLUCOMTR-MCNC: 205 MG/DL — HIGH (ref 70–99)
GLUCOSE SERPL-MCNC: 269 MG/DL — HIGH (ref 70–99)
HCT VFR BLD CALC: 28.1 % — LOW (ref 39–50)
HGB BLD-MCNC: 9.1 G/DL — LOW (ref 13–17)
MAGNESIUM SERPL-MCNC: 2.2 MG/DL — SIGNIFICANT CHANGE UP (ref 1.6–2.6)
MCHC RBC-ENTMCNC: 29.4 PG — SIGNIFICANT CHANGE UP (ref 27–34)
MCHC RBC-ENTMCNC: 32.5 GM/DL — SIGNIFICANT CHANGE UP (ref 32–36)
MCV RBC AUTO: 90.3 FL — SIGNIFICANT CHANGE UP (ref 80–100)
PHOSPHATE SERPL-MCNC: 5.4 MG/DL — HIGH (ref 2.5–4.5)
PLATELET # BLD AUTO: 127 K/UL — LOW (ref 150–400)
POTASSIUM SERPL-MCNC: 4.6 MMOL/L — SIGNIFICANT CHANGE UP (ref 3.5–5.3)
POTASSIUM SERPL-SCNC: 4.6 MMOL/L — SIGNIFICANT CHANGE UP (ref 3.5–5.3)
PROT SERPL-MCNC: 5.8 G/DL — LOW (ref 6–8.3)
RBC # BLD: 3.11 M/UL — LOW (ref 4.2–5.8)
RBC # FLD: 11.8 % — SIGNIFICANT CHANGE UP (ref 10.3–14.5)
SODIUM SERPL-SCNC: 133 MMOL/L — LOW (ref 135–145)
WBC # BLD: 13.5 K/UL — HIGH (ref 3.8–10.5)
WBC # FLD AUTO: 13.5 K/UL — HIGH (ref 3.8–10.5)

## 2019-09-26 PROCEDURE — 71045 X-RAY EXAM CHEST 1 VIEW: CPT | Mod: 26

## 2019-09-26 PROCEDURE — 99291 CRITICAL CARE FIRST HOUR: CPT

## 2019-09-26 PROCEDURE — 93306 TTE W/DOPPLER COMPLETE: CPT | Mod: 26

## 2019-09-26 RX ORDER — FAMOTIDINE 10 MG/ML
20 INJECTION INTRAVENOUS DAILY
Refills: 0 | Status: DISCONTINUED | OUTPATIENT
Start: 2019-09-26 | End: 2019-09-26

## 2019-09-26 RX ORDER — INSULIN LISPRO 100/ML
VIAL (ML) SUBCUTANEOUS
Refills: 0 | Status: DISCONTINUED | OUTPATIENT
Start: 2019-09-26 | End: 2019-10-06

## 2019-09-26 RX ORDER — SEVELAMER CARBONATE 2400 MG/1
800 POWDER, FOR SUSPENSION ORAL
Refills: 0 | Status: DISCONTINUED | OUTPATIENT
Start: 2019-09-26 | End: 2019-10-06

## 2019-09-26 RX ORDER — DEXTROSE 50 % IN WATER 50 %
25 SYRINGE (ML) INTRAVENOUS ONCE
Refills: 0 | Status: DISCONTINUED | OUTPATIENT
Start: 2019-09-26 | End: 2019-10-06

## 2019-09-26 RX ORDER — FAMOTIDINE 10 MG/ML
20 INJECTION INTRAVENOUS
Refills: 0 | Status: DISCONTINUED | OUTPATIENT
Start: 2019-09-26 | End: 2019-09-26

## 2019-09-26 RX ORDER — INSULIN LISPRO 100/ML
7 VIAL (ML) SUBCUTANEOUS
Refills: 0 | Status: DISCONTINUED | OUTPATIENT
Start: 2019-09-26 | End: 2019-09-28

## 2019-09-26 RX ORDER — FAMOTIDINE 10 MG/ML
20 INJECTION INTRAVENOUS AT BEDTIME
Refills: 0 | Status: DISCONTINUED | OUTPATIENT
Start: 2019-09-26 | End: 2019-10-06

## 2019-09-26 RX ORDER — HYDROMORPHONE HYDROCHLORIDE 2 MG/ML
0.5 INJECTION INTRAMUSCULAR; INTRAVENOUS; SUBCUTANEOUS ONCE
Refills: 0 | Status: DISCONTINUED | OUTPATIENT
Start: 2019-09-26 | End: 2019-09-26

## 2019-09-26 RX ORDER — INSULIN GLARGINE 100 [IU]/ML
15 INJECTION, SOLUTION SUBCUTANEOUS AT BEDTIME
Refills: 0 | Status: DISCONTINUED | OUTPATIENT
Start: 2019-09-26 | End: 2019-09-27

## 2019-09-26 RX ORDER — DEXTROSE 50 % IN WATER 50 %
15 SYRINGE (ML) INTRAVENOUS ONCE
Refills: 0 | Status: DISCONTINUED | OUTPATIENT
Start: 2019-09-26 | End: 2019-10-06

## 2019-09-26 RX ORDER — DEXTROSE 50 % IN WATER 50 %
12.5 SYRINGE (ML) INTRAVENOUS ONCE
Refills: 0 | Status: DISCONTINUED | OUTPATIENT
Start: 2019-09-26 | End: 2019-10-06

## 2019-09-26 RX ORDER — INSULIN LISPRO 100/ML
VIAL (ML) SUBCUTANEOUS AT BEDTIME
Refills: 0 | Status: DISCONTINUED | OUTPATIENT
Start: 2019-09-26 | End: 2019-10-06

## 2019-09-26 RX ADMIN — OXYCODONE AND ACETAMINOPHEN 2 TABLET(S): 5; 325 TABLET ORAL at 08:10

## 2019-09-26 RX ADMIN — Medication 81 MILLIGRAM(S): at 12:54

## 2019-09-26 RX ADMIN — OXYCODONE AND ACETAMINOPHEN 2 TABLET(S): 5; 325 TABLET ORAL at 01:00

## 2019-09-26 RX ADMIN — FAMOTIDINE 20 MILLIGRAM(S): 10 INJECTION INTRAVENOUS at 21:40

## 2019-09-26 RX ADMIN — Medication 100 MILLIGRAM(S): at 05:30

## 2019-09-26 RX ADMIN — KETOTIFEN FUMARATE 1 DROP(S): 0.34 SOLUTION OPHTHALMIC at 18:20

## 2019-09-26 RX ADMIN — HYDROMORPHONE HYDROCHLORIDE 0.5 MILLIGRAM(S): 2 INJECTION INTRAMUSCULAR; INTRAVENOUS; SUBCUTANEOUS at 12:30

## 2019-09-26 RX ADMIN — SODIUM CHLORIDE 3 MILLILITER(S): 9 INJECTION INTRAMUSCULAR; INTRAVENOUS; SUBCUTANEOUS at 21:40

## 2019-09-26 RX ADMIN — AMIODARONE HYDROCHLORIDE 400 MILLIGRAM(S): 400 TABLET ORAL at 13:01

## 2019-09-26 RX ADMIN — Medication 100 MILLIGRAM(S): at 21:39

## 2019-09-26 RX ADMIN — SEVELAMER CARBONATE 800 MILLIGRAM(S): 2400 POWDER, FOR SUSPENSION ORAL at 18:20

## 2019-09-26 RX ADMIN — SODIUM CHLORIDE 3 MILLILITER(S): 9 INJECTION INTRAMUSCULAR; INTRAVENOUS; SUBCUTANEOUS at 05:16

## 2019-09-26 RX ADMIN — OXYCODONE AND ACETAMINOPHEN 2 TABLET(S): 5; 325 TABLET ORAL at 15:46

## 2019-09-26 RX ADMIN — Medication 0: at 08:12

## 2019-09-26 RX ADMIN — Medication 7 UNIT(S): at 18:18

## 2019-09-26 RX ADMIN — AMIODARONE HYDROCHLORIDE 400 MILLIGRAM(S): 400 TABLET ORAL at 05:30

## 2019-09-26 RX ADMIN — Medication 4: at 12:56

## 2019-09-26 RX ADMIN — MIDODRINE HYDROCHLORIDE 5 MILLIGRAM(S): 2.5 TABLET ORAL at 05:30

## 2019-09-26 RX ADMIN — Medication 7 UNIT(S): at 12:56

## 2019-09-26 RX ADMIN — CHLORHEXIDINE GLUCONATE 1 APPLICATION(S): 213 SOLUTION TOPICAL at 05:31

## 2019-09-26 RX ADMIN — Medication 2: at 18:18

## 2019-09-26 RX ADMIN — SODIUM CHLORIDE 3 MILLILITER(S): 9 INJECTION INTRAMUSCULAR; INTRAVENOUS; SUBCUTANEOUS at 12:51

## 2019-09-26 RX ADMIN — OXYCODONE AND ACETAMINOPHEN 2 TABLET(S): 5; 325 TABLET ORAL at 09:00

## 2019-09-26 RX ADMIN — OXYCODONE AND ACETAMINOPHEN 2 TABLET(S): 5; 325 TABLET ORAL at 00:14

## 2019-09-26 RX ADMIN — HEPARIN SODIUM 5000 UNIT(S): 5000 INJECTION INTRAVENOUS; SUBCUTANEOUS at 05:31

## 2019-09-26 RX ADMIN — AMIODARONE HYDROCHLORIDE 400 MILLIGRAM(S): 400 TABLET ORAL at 21:38

## 2019-09-26 RX ADMIN — HYDROMORPHONE HYDROCHLORIDE 0.5 MILLIGRAM(S): 2 INJECTION INTRAMUSCULAR; INTRAVENOUS; SUBCUTANEOUS at 13:00

## 2019-09-26 RX ADMIN — HEPARIN SODIUM 5000 UNIT(S): 5000 INJECTION INTRAVENOUS; SUBCUTANEOUS at 21:39

## 2019-09-26 RX ADMIN — INSULIN GLARGINE 15 UNIT(S): 100 INJECTION, SOLUTION SUBCUTANEOUS at 21:38

## 2019-09-26 RX ADMIN — SIMVASTATIN 40 MILLIGRAM(S): 20 TABLET, FILM COATED ORAL at 21:39

## 2019-09-26 RX ADMIN — OXYCODONE AND ACETAMINOPHEN 2 TABLET(S): 5; 325 TABLET ORAL at 16:16

## 2019-09-26 RX ADMIN — HEPARIN SODIUM 5000 UNIT(S): 5000 INJECTION INTRAVENOUS; SUBCUTANEOUS at 13:00

## 2019-09-26 RX ADMIN — KETOTIFEN FUMARATE 1 DROP(S): 0.34 SOLUTION OPHTHALMIC at 05:38

## 2019-09-26 RX ADMIN — Medication 6 UNIT(S): at 08:12

## 2019-09-26 RX ADMIN — SEVELAMER CARBONATE 800 MILLIGRAM(S): 2400 POWDER, FOR SUSPENSION ORAL at 12:54

## 2019-09-26 RX ADMIN — Medication 100 MILLIGRAM(S): at 13:01

## 2019-09-26 NOTE — PROGRESS NOTE ADULT - SUBJECTIVE AND OBJECTIVE BOX
[facility] - NII Knight / NII Mendiola / JASWANT Arroyo/ NII Mackey/ NII Macias/ BRIGITTE Esposito / ARAMIS Calle / LUDA Bowen  ---------------------------------------------------------------------------------------------------------------  seen and examined today for ESRD on HD  Interval : doing well overall  VITALS:  T(F): 98.3 (09-26-19 @ 12:00), Max: 98.3 (09-26-19 @ 12:00)  HR: 74 (09-26-19 @ 13:00)  BP: 101/68 (09-25-19 @ 18:45)  RR: 34 (09-26-19 @ 13:00)  SpO2: 98% (09-26-19 @ 13:00)  Wt(kg): --    09-25 @ 07:01  -  09-26 @ 07:00  --------------------------------------------------------  IN: 1660 mL / OUT: 2450 mL / NET: -790 mL    09-26 @ 07:01  -  09-26 @ 13:24  --------------------------------------------------------  IN: 660 mL / OUT: 0 mL / NET: 660 mL      Physical Exam :-  Constitutional: NAD  Neck: Supple.  Respiratory: Bilateral equal breath sounds,  Cardiovascular: S1, S2 normal,  Gastrointestinal: Bowel Sounds present, soft, non tender.  Extremities: No edema  Neurological: Alert and Oriented x 3, no focal deficits  Psychiatric: Normal mood, normal affect  Data:-  Allergies :   lisinopril (Other)    Hospital Medications:   MEDICATIONS  (STANDING):  amiodarone    Tablet 400 milliGRAM(s) Oral every 8 hours  amiodarone    Tablet   Oral   aspirin enteric coated 81 milliGRAM(s) Oral daily  chlorhexidine 2% Cloths 1 Application(s) Topical <User Schedule>  dextrose 5%. 1000 milliLiter(s) (50 mL/Hr) IV Continuous <Continuous>  dextrose 50% Injectable 12.5 Gram(s) IV Push once  dextrose 50% Injectable 25 Gram(s) IV Push once  dextrose 50% Injectable 25 Gram(s) IV Push once  docusate sodium 100 milliGRAM(s) Oral three times a day  famotidine    Tablet 20 milliGRAM(s) Oral at bedtime  heparin  Injectable 5000 Unit(s) SubCutaneous every 8 hours  insulin glargine Injectable (LANTUS) 15 Unit(s) SubCutaneous at bedtime  insulin lispro (HumaLOG) corrective regimen sliding scale   SubCutaneous three times a day before meals  insulin lispro (HumaLOG) corrective regimen sliding scale   SubCutaneous at bedtime  insulin lispro Injectable (HumaLOG) 7 Unit(s) SubCutaneous three times a day before meals  ketotifen 0.025% Ophthalmic Solution 1 Drop(s) Both EYES two times a day  methimazole 5 milliGRAM(s) Oral daily  sevelamer carbonate 800 milliGRAM(s) Oral three times a day with meals  simvastatin 40 milliGRAM(s) Oral at bedtime  sodium chloride 0.9% lock flush 3 milliLiter(s) IV Push every 8 hours  sodium chloride 0.9%. 1000 milliLiter(s) (10 mL/Hr) IV Continuous <Continuous>  tiotropium 18 MICROgram(s) Capsule 1 Capsule(s) Inhalation daily    09-26    133<L>  |  94<L>  |  40<H>  ----------------------------<  269<H>  4.6   |  26  |  5.36<H>    Ca    8.4      26 Sep 2019 00:35  Phos  5.4     09-26  Mg     2.2     09-26    TPro  5.8<L>  /  Alb  3.2<L>  /  TBili  0.2  /  DBili      /  AST  15  /  ALT  24  /  AlkPhos  51  09-26    Creatinine Trend: 5.36 <--, 6.23 <--, 5.44 <--, 4.38 <--, 5.34 <--, 7.49 <--, 5.55 <--                        9.1    13.5  )-----------( 127      ( 26 Sep 2019 00:35 )             28.1

## 2019-09-26 NOTE — PROGRESS NOTE ADULT - PROBLEM SELECTOR PLAN 1
Will increase Lantus to 15u at bedtime, increase Humalog to 7u before each meal, and continue coverage scale. Will continue monitoring FS, log, and FU.  Patient counseled for compliance with consistent low carb diet.

## 2019-09-26 NOTE — PROGRESS NOTE ADULT - SUBJECTIVE AND OBJECTIVE BOX
CRITICAL CARE ATTENDING - CTICU    MEDICATIONS  (STANDING):  amiodarone    Tablet 400 milliGRAM(s) Oral every 8 hours  amiodarone    Tablet   Oral   aspirin enteric coated 81 milliGRAM(s) Oral daily  chlorhexidine 2% Cloths 1 Application(s) Topical <User Schedule>  dextrose 5%. 1000 milliLiter(s) (50 mL/Hr) IV Continuous <Continuous>  dextrose 5%. 1000 milliLiter(s) (15 mL/Hr) IV Continuous <Continuous>  docusate sodium 100 milliGRAM(s) Oral three times a day  famotidine Injectable 20 milliGRAM(s) IV Push every 24 hours  heparin  Injectable 5000 Unit(s) SubCutaneous every 8 hours  insulin glargine Injectable (LANTUS) 10 Unit(s) SubCutaneous at bedtime  insulin lispro (HumaLOG) corrective regimen sliding scale   SubCutaneous Before meals and at bedtime  insulin lispro Injectable (HumaLOG) 6 Unit(s) SubCutaneous before breakfast  insulin lispro Injectable (HumaLOG) 6 Unit(s) SubCutaneous before lunch  insulin lispro Injectable (HumaLOG) 6 Unit(s) SubCutaneous before dinner  ketotifen 0.025% Ophthalmic Solution 1 Drop(s) Both EYES two times a day  methimazole 5 milliGRAM(s) Oral daily  midodrine 5 milliGRAM(s) Oral every 8 hours  simvastatin 40 milliGRAM(s) Oral at bedtime  sodium chloride 0.9% lock flush 3 milliLiter(s) IV Push every 8 hours  sodium chloride 0.9%. 1000 milliLiter(s) (10 mL/Hr) IV Continuous <Continuous>  tiotropium 18 MICROgram(s) Capsule 1 Capsule(s) Inhalation daily                                    9.1    13.5  )-----------( 127      ( 26 Sep 2019 00:35 )             28.1       -    133<L>  |  94<L>  |  40<H>  ----------------------------<  269<H>  4.6   |  26  |  5.36<H>    Ca    8.4      26 Sep 2019 00:35  Phos  5.4       Mg     2.2     -    TPro  5.8<L>  /  Alb  3.2<L>  /  TBili  0.2  /  DBili  x   /  AST  15  /  ALT  24  /  AlkPhos  51                Daily     Daily Weight in k.1 (26 Sep 2019 00:00)       @ 07:  -   @ 07:00  --------------------------------------------------------  IN: 540 mL / OUT: 150 mL / NET: 390 mL     @ 07:  -   @ 06:28  --------------------------------------------------------  IN: 1650 mL / OUT: 2450 mL / NET: -800 mL        Critically Ill patient  : [ ] preoperative ,   [ ] post operative    Requires :  [ ] Arterial Line   [ ] Central Line  [ ] PA catheter  [ ] IABP  [ ] ECMO  [ ] LVAD  [ ] Ventilator  [ ] pacemaker [ ] Impella.                      [ ] ABG's     [ ] Pulse Oxymetry Monitoring  Bedside evaluation , monitoring , treatment of hemodynamics , fluids , IVP/ IVCD meds.        Diagnosis:     POD 2 - CABG x 3 L    Chronic Renal Failure - End Stage Renal Disease     hypotension    Hypovolemia     [x ] Hemodialysis [ ] Hemofiltration:    [ x] negative fluid balance [ ] even fluid balance [ ] positive fluid balance, in a hemodynamically unstable patient.     Thrombocytopenia     A Fib / Tachycardia     Hyperthyroid     Old CVA     DM     Hypotension a/w Hypertension, requiring intravenous medication.                       Discussed with CT surgeon, Physician's Assistant - Nurse Practitioner- Critical care medicine team.   Dicussed at  AM / PM rounds.   Chart, labs , films reviewed.    Total Time: CRITICAL CARE ATTENDING - CTICU    MEDICATIONS  (STANDING):  amiodarone    Tablet 400 milliGRAM(s) Oral every 8 hours  amiodarone    Tablet   Oral   aspirin enteric coated 81 milliGRAM(s) Oral daily  chlorhexidine 2% Cloths 1 Application(s) Topical <User Schedule>  dextrose 5%. 1000 milliLiter(s) (50 mL/Hr) IV Continuous <Continuous>  dextrose 5%. 1000 milliLiter(s) (15 mL/Hr) IV Continuous <Continuous>  docusate sodium 100 milliGRAM(s) Oral three times a day  famotidine Injectable 20 milliGRAM(s) IV Push every 24 hours  heparin  Injectable 5000 Unit(s) SubCutaneous every 8 hours  insulin glargine Injectable (LANTUS) 10 Unit(s) SubCutaneous at bedtime  insulin lispro (HumaLOG) corrective regimen sliding scale   SubCutaneous Before meals and at bedtime  insulin lispro Injectable (HumaLOG) 6 Unit(s) SubCutaneous before breakfast  insulin lispro Injectable (HumaLOG) 6 Unit(s) SubCutaneous before lunch  insulin lispro Injectable (HumaLOG) 6 Unit(s) SubCutaneous before dinner  ketotifen 0.025% Ophthalmic Solution 1 Drop(s) Both EYES two times a day  methimazole 5 milliGRAM(s) Oral daily  midodrine 5 milliGRAM(s) Oral every 8 hours  simvastatin 40 milliGRAM(s) Oral at bedtime  sodium chloride 0.9% lock flush 3 milliLiter(s) IV Push every 8 hours  sodium chloride 0.9%. 1000 milliLiter(s) (10 mL/Hr) IV Continuous <Continuous>  tiotropium 18 MICROgram(s) Capsule 1 Capsule(s) Inhalation daily                                    9.1    13.5  )-----------( 127      ( 26 Sep 2019 00:35 )             28.1       -    133<L>  |  94<L>  |  40<H>  ----------------------------<  269<H>  4.6   |  26  |  5.36<H>    Ca    8.4      26 Sep 2019 00:35  Phos  5.4       Mg     2.2     -    TPro  5.8<L>  /  Alb  3.2<L>  /  TBili  0.2  /  DBili  x   /  AST  15  /  ALT  24  /  AlkPhos  51                Daily     Daily Weight in k.1 (26 Sep 2019 00:00)       @ 07:  -   @ 07:00  --------------------------------------------------------  IN: 540 mL / OUT: 150 mL / NET: 390 mL     @ 07:  -   @ 06:28  --------------------------------------------------------  IN: 1650 mL / OUT: 2450 mL / NET: -800 mL        Critically Ill patient  : [ ] preoperative ,   [ x] post operative    Requires :  [ ] Arterial Line   [ ] Central Line  [ ] PA catheter  [ ] IABP  [ ] ECMO  [ ] LVAD  [ ] Ventilator  [ ] pacemaker [ ] Impella.                      [ ] ABG's     [ ] Pulse Oxymetry Monitoring  Bedside evaluation , monitoring , treatment of hemodynamics , fluids , IVP/ IVCD meds.        Diagnosis:     POD 3 - CABG x 3 L    Chronic Renal Failure - End Stage Renal Disease   Had dialysis yesterday and had 1.5    hypotension    Hypovolemia     [x ] Hemodialysis [ ] Hemofiltration:    [ x] negative fluid balance [ ] even fluid balance [ ] positive fluid balance, in a hemodynamically unstable patient.     Thrombocytopenia     A Fib / Tachycardia   Converted this morning at 0630 to NSR.    Hyperthyroid     Old CVA     DM     Hypotension a/w Hypertension, requiring intravenous medication.       Plan:  Monitor BP and glucose.                Discussed with CT surgeon, Physician's Assistant - Nurse Practitioner- Critical care medicine team.   Dicussed at  AM / PM rounds.   Chart, labs , films reviewed.    Total Time: CRITICAL CARE ATTENDING - CTICU    MEDICATIONS  (STANDING):  amiodarone    Tablet 400 milliGRAM(s) Oral every 8 hours  amiodarone    Tablet   Oral   aspirin enteric coated 81 milliGRAM(s) Oral daily  chlorhexidine 2% Cloths 1 Application(s) Topical <User Schedule>  dextrose 5%. 1000 milliLiter(s) (50 mL/Hr) IV Continuous <Continuous>  dextrose 5%. 1000 milliLiter(s) (15 mL/Hr) IV Continuous <Continuous>  docusate sodium 100 milliGRAM(s) Oral three times a day  famotidine Injectable 20 milliGRAM(s) IV Push every 24 hours  heparin  Injectable 5000 Unit(s) SubCutaneous every 8 hours  insulin glargine Injectable (LANTUS) 10 Unit(s) SubCutaneous at bedtime  insulin lispro (HumaLOG) corrective regimen sliding scale   SubCutaneous Before meals and at bedtime  insulin lispro Injectable (HumaLOG) 6 Unit(s) SubCutaneous before breakfast  insulin lispro Injectable (HumaLOG) 6 Unit(s) SubCutaneous before lunch  insulin lispro Injectable (HumaLOG) 6 Unit(s) SubCutaneous before dinner  ketotifen 0.025% Ophthalmic Solution 1 Drop(s) Both EYES two times a day  methimazole 5 milliGRAM(s) Oral daily  midodrine 5 milliGRAM(s) Oral every 8 hours  simvastatin 40 milliGRAM(s) Oral at bedtime  sodium chloride 0.9% lock flush 3 milliLiter(s) IV Push every 8 hours  sodium chloride 0.9%. 1000 milliLiter(s) (10 mL/Hr) IV Continuous <Continuous>  tiotropium 18 MICROgram(s) Capsule 1 Capsule(s) Inhalation daily                                    9.1    13.5  )-----------( 127      ( 26 Sep 2019 00:35 )             28.1       -    133<L>  |  94<L>  |  40<H>  ----------------------------<  269<H>  4.6   |  26  |  5.36<H>    Ca    8.4      26 Sep 2019 00:35  Phos  5.4       Mg     2.2     -    TPro  5.8<L>  /  Alb  3.2<L>  /  TBili  0.2  /  DBili  x   /  AST  15  /  ALT  24  /  AlkPhos  51                Daily     Daily Weight in k.1 (26 Sep 2019 00:00)       @ 07:  -   @ 07:00  --------------------------------------------------------  IN: 540 mL / OUT: 150 mL / NET: 390 mL     @ 07:  -   @ 06:28  --------------------------------------------------------  IN: 1650 mL / OUT: 2450 mL / NET: -800 mL        Critically Ill patient  : [ ] preoperative ,   [ x] post operative    Requires :  [x ] Arterial Line   [x ] Central Line  [ ] PA catheter  [ ] IABP  [ ] ECMO  [ ] LVAD  [ ] Ventilator  [x ] pacemaker [ ] Impella.                      [ x] ABG's     [x ] Pulse Oxymetry Monitoring  Bedside evaluation , monitoring , treatment of hemodynamics , fluids , IVP/ IVCD meds.        Diagnosis:     POD 3 - CABG x 3 L    Chronic Renal Failure - End Stage Renal Disease   Had dialysis yesterday and had 1.5    hypotension    Hypovolemia     [x ] Hemodialysis [ ] Hemofiltration:    [ x] negative fluid balance [ ] even fluid balance [ ] positive fluid balance, in a hemodynamically unstable patient.     Thrombocytopenia     A Fib / Tachycardia   Converted this morning at 0630 to NSR.    Hyperthyroid     Old CVA     DM     Hypotension a/w Hypertension, requiring intravenous medication.       Plan:  Monitor BP and glucose.                Discussed with CT surgeon, Physician's Assistant - Nurse Practitioner- Critical care medicine team.   Dicussed at  AM / PM rounds.   Chart, labs , films reviewed.    Total Time: 30 min

## 2019-09-26 NOTE — PROGRESS NOTE ADULT - ASSESSMENT
69 y.o. male with pmh of ESRD( HD on tu/Th/sa) at [facility] via [facility]. Last dialyzed yesterday 9/14, was sent to [facility] for cath after presenting with SOB and a positive stress tests/p CABG 9/23/19 ;Renal following for ESRD Mx.    labs, chart reviewed

## 2019-09-26 NOTE — PROGRESS NOTE ADULT - SUBJECTIVE AND OBJECTIVE BOX
Chief complaint  Patient is a 69y old  Male who presents with a chief complaint of CABG (24 Sep 2019 23:40)   Review of systems  Patient in bed, looks comfortable, no fever, no hypoglycemia.    Labs and Fingersticks  CAPILLARY BLOOD GLUCOSE      POCT Blood Glucose.: 133 mg/dL (26 Sep 2019 08:08)  POCT Blood Glucose.: 289 mg/dL (25 Sep 2019 22:50)  POCT Blood Glucose.: 288 mg/dL (25 Sep 2019 12:13)      Anion Gap, Serum: 13 (09-26 @ 00:35)  Anion Gap, Serum: 18 <H> (09-25 @ 02:14)      Calcium, Total Serum: 8.4 (09-26 @ 00:35)  Calcium, Total Serum: 9.1 (09-25 @ 02:14)  Albumin, Serum: 3.2 <L> (09-26 @ 00:35)  Albumin, Serum: 3.1 <L> (09-25 @ 02:14)    Alanine Aminotransferase (ALT/SGPT): 24 (09-26 @ 00:35)  Alanine Aminotransferase (ALT/SGPT): 31 (09-25 @ 02:14)  Alkaline Phosphatase, Serum: 51 (09-26 @ 00:35)  Alkaline Phosphatase, Serum: 47 (09-25 @ 02:14)  Aspartate Aminotransferase (AST/SGOT): 15 (09-26 @ 00:35)  Aspartate Aminotransferase (AST/SGOT): 29 (09-25 @ 02:14)        09-26    133<L>  |  94<L>  |  40<H>  ----------------------------<  269<H>  4.6   |  26  |  5.36<H>    Ca    8.4      26 Sep 2019 00:35  Phos  5.4     09-26  Mg     2.2     09-26    TPro  5.8<L>  /  Alb  3.2<L>  /  TBili  0.2  /  DBili  x   /  AST  15  /  ALT  24  /  AlkPhos  51  09-26                        9.1    13.5  )-----------( 127      ( 26 Sep 2019 00:35 )             28.1     Medications  MEDICATIONS  (STANDING):  amiodarone    Tablet 400 milliGRAM(s) Oral every 8 hours  amiodarone    Tablet   Oral   aspirin enteric coated 81 milliGRAM(s) Oral daily  chlorhexidine 2% Cloths 1 Application(s) Topical <User Schedule>  dextrose 5%. 1000 milliLiter(s) (50 mL/Hr) IV Continuous <Continuous>  dextrose 50% Injectable 12.5 Gram(s) IV Push once  dextrose 50% Injectable 25 Gram(s) IV Push once  dextrose 50% Injectable 25 Gram(s) IV Push once  docusate sodium 100 milliGRAM(s) Oral three times a day  famotidine    Tablet 20 milliGRAM(s) Oral <User Schedule>  heparin  Injectable 5000 Unit(s) SubCutaneous every 8 hours  insulin glargine Injectable (LANTUS) 15 Unit(s) SubCutaneous at bedtime  insulin lispro (HumaLOG) corrective regimen sliding scale   SubCutaneous three times a day before meals  insulin lispro (HumaLOG) corrective regimen sliding scale   SubCutaneous at bedtime  insulin lispro Injectable (HumaLOG) 7 Unit(s) SubCutaneous three times a day before meals  ketotifen 0.025% Ophthalmic Solution 1 Drop(s) Both EYES two times a day  methimazole 5 milliGRAM(s) Oral daily  sevelamer carbonate 800 milliGRAM(s) Oral three times a day with meals  simvastatin 40 milliGRAM(s) Oral at bedtime  sodium chloride 0.9% lock flush 3 milliLiter(s) IV Push every 8 hours  sodium chloride 0.9%. 1000 milliLiter(s) (10 mL/Hr) IV Continuous <Continuous>  tiotropium 18 MICROgram(s) Capsule 1 Capsule(s) Inhalation daily      Physical Exam  General: Patient comfortable in bed  Vital Signs Last 12 Hrs  T(F): 96 (09-26-19 @ 08:00), Max: 97.2 (09-26-19 @ 04:00)  HR: 73 (09-26-19 @ 11:00) (67 - 107)  BP: --  BP(mean): --  RR: 30 (09-26-19 @ 11:00) (15 - 30)  SpO2: 96% (09-26-19 @ 11:00) (92% - 100%)  Neck: No palpable thyroid nodules.  CVS: S1S2, No murmurs  Respiratory: No wheezing, no crepitations  GI: Abdomen soft, bowel sounds positive  Musculoskeletal:  edema lower extremities.   Skin: No skin rashes, no ecchymosis    Diagnostics Chief complaint  Patient is a 69y old  Male who presents with a chief complaint of CABG (24 Sep 2019 23:40)   Review of systems  Patient in bed, looks comfortable, no fever,  no hypoglycemia.    Labs and Fingersticks  CAPILLARY BLOOD GLUCOSE      POCT Blood Glucose.: 133 mg/dL (26 Sep 2019 08:08)  POCT Blood Glucose.: 289 mg/dL (25 Sep 2019 22:50)  POCT Blood Glucose.: 288 mg/dL (25 Sep 2019 12:13)      Anion Gap, Serum: 13 (09-26 @ 00:35)  Anion Gap, Serum: 18 <H> (09-25 @ 02:14)      Calcium, Total Serum: 8.4 (09-26 @ 00:35)  Calcium, Total Serum: 9.1 (09-25 @ 02:14)  Albumin, Serum: 3.2 <L> (09-26 @ 00:35)  Albumin, Serum: 3.1 <L> (09-25 @ 02:14)    Alanine Aminotransferase (ALT/SGPT): 24 (09-26 @ 00:35)  Alanine Aminotransferase (ALT/SGPT): 31 (09-25 @ 02:14)  Alkaline Phosphatase, Serum: 51 (09-26 @ 00:35)  Alkaline Phosphatase, Serum: 47 (09-25 @ 02:14)  Aspartate Aminotransferase (AST/SGOT): 15 (09-26 @ 00:35)  Aspartate Aminotransferase (AST/SGOT): 29 (09-25 @ 02:14)        09-26    133<L>  |  94<L>  |  40<H>  ----------------------------<  269<H>  4.6   |  26  |  5.36<H>    Ca    8.4      26 Sep 2019 00:35  Phos  5.4     09-26  Mg     2.2     09-26    TPro  5.8<L>  /  Alb  3.2<L>  /  TBili  0.2  /  DBili  x   /  AST  15  /  ALT  24  /  AlkPhos  51  09-26                        9.1    13.5  )-----------( 127      ( 26 Sep 2019 00:35 )             28.1     Medications  MEDICATIONS  (STANDING):  amiodarone    Tablet 400 milliGRAM(s) Oral every 8 hours  amiodarone    Tablet   Oral   aspirin enteric coated 81 milliGRAM(s) Oral daily  chlorhexidine 2% Cloths 1 Application(s) Topical <User Schedule>  dextrose 5%. 1000 milliLiter(s) (50 mL/Hr) IV Continuous <Continuous>  dextrose 50% Injectable 12.5 Gram(s) IV Push once  dextrose 50% Injectable 25 Gram(s) IV Push once  dextrose 50% Injectable 25 Gram(s) IV Push once  docusate sodium 100 milliGRAM(s) Oral three times a day  famotidine    Tablet 20 milliGRAM(s) Oral <User Schedule>  heparin  Injectable 5000 Unit(s) SubCutaneous every 8 hours  insulin glargine Injectable (LANTUS) 15 Unit(s) SubCutaneous at bedtime  insulin lispro (HumaLOG) corrective regimen sliding scale   SubCutaneous three times a day before meals  insulin lispro (HumaLOG) corrective regimen sliding scale   SubCutaneous at bedtime  insulin lispro Injectable (HumaLOG) 7 Unit(s) SubCutaneous three times a day before meals  ketotifen 0.025% Ophthalmic Solution 1 Drop(s) Both EYES two times a day  methimazole 5 milliGRAM(s) Oral daily  sevelamer carbonate 800 milliGRAM(s) Oral three times a day with meals  simvastatin 40 milliGRAM(s) Oral at bedtime  sodium chloride 0.9% lock flush 3 milliLiter(s) IV Push every 8 hours  sodium chloride 0.9%. 1000 milliLiter(s) (10 mL/Hr) IV Continuous <Continuous>  tiotropium 18 MICROgram(s) Capsule 1 Capsule(s) Inhalation daily      Physical Exam  General: Patient comfortable in bed  Vital Signs Last 12 Hrs  T(F): 96 (09-26-19 @ 08:00), Max: 97.2 (09-26-19 @ 04:00)  HR: 73 (09-26-19 @ 11:00) (67 - 107)  BP: --  BP(mean): --  RR: 30 (09-26-19 @ 11:00) (15 - 30)  SpO2: 96% (09-26-19 @ 11:00) (92% - 100%)  Neck: No palpable thyroid nodules.  CVS: S1S2, No murmurs  Respiratory: No wheezing, no crepitations  GI: Abdomen soft, bowel sounds positive  Musculoskeletal:  edema lower extremities.   Skin: No skin rashes, no ecchymosis    Diagnostics

## 2019-09-26 NOTE — PROGRESS NOTE ADULT - SUBJECTIVE AND OBJECTIVE BOX
S: Family at bedside. Comfortable on nasal cannula. Denies chest pain or SOB. Review of systems otherwise (-)      MEDICATIONS  (STANDING):  amiodarone    Tablet 400 milliGRAM(s) Oral every 8 hours  amiodarone    Tablet   Oral   aspirin enteric coated 81 milliGRAM(s) Oral daily  chlorhexidine 2% Cloths 1 Application(s) Topical <User Schedule>  dextrose 5%. 1000 milliLiter(s) (50 mL/Hr) IV Continuous <Continuous>  dextrose 50% Injectable 12.5 Gram(s) IV Push once  dextrose 50% Injectable 25 Gram(s) IV Push once  dextrose 50% Injectable 25 Gram(s) IV Push once  docusate sodium 100 milliGRAM(s) Oral three times a day  famotidine    Tablet 20 milliGRAM(s) Oral at bedtime  heparin  Injectable 5000 Unit(s) SubCutaneous every 8 hours  insulin glargine Injectable (LANTUS) 15 Unit(s) SubCutaneous at bedtime  insulin lispro (HumaLOG) corrective regimen sliding scale   SubCutaneous three times a day before meals  insulin lispro (HumaLOG) corrective regimen sliding scale   SubCutaneous at bedtime  insulin lispro Injectable (HumaLOG) 7 Unit(s) SubCutaneous three times a day before meals  ketotifen 0.025% Ophthalmic Solution 1 Drop(s) Both EYES two times a day  methimazole 5 milliGRAM(s) Oral daily  sevelamer carbonate 800 milliGRAM(s) Oral three times a day with meals  simvastatin 40 milliGRAM(s) Oral at bedtime  sodium chloride 0.9% lock flush 3 milliLiter(s) IV Push every 8 hours  sodium chloride 0.9%. 1000 milliLiter(s) (10 mL/Hr) IV Continuous <Continuous>  tiotropium 18 MICROgram(s) Capsule 1 Capsule(s) Inhalation daily    MEDICATIONS  (PRN):  ALBUTerol/ipratropium for Nebulization 3 milliLiter(s) Nebulizer every 6 hours PRN Shortness of Breath and/or Wheezing  dextrose 40% Gel 15 Gram(s) Oral once PRN Blood Glucose LESS THAN 70 milliGRAM(s)/deciLiter  glucagon  Injectable 1 milliGRAM(s) IntraMuscular once PRN Glucose <70 milliGRAM(s)/deciLiter  oxyCODONE    5 mG/acetaminophen 325 mG 1 Tablet(s) Oral every 6 hours PRN Mild Pain (1 - 3)  oxyCODONE    5 mG/acetaminophen 325 mG 2 Tablet(s) Oral every 6 hours PRN Moderate Pain (4 - 6)      LABS:                            9.1    13.5  )-----------( 127      ( 26 Sep 2019 00:35 )             28.1     Hemoglobin: 9.1 g/dL ( @ 00:35)  Hemoglobin: 10.1 g/dL ( @ 02:14)  Hemoglobin: 11.0 g/dL ( @ 00:48)  Hemoglobin: 11.8 g/dL ( @ 13:52)  Hemoglobin: 12.1 g/dL ( @ 09:49)        133<L>  |  94<L>  |  40<H>  ----------------------------<  269<H>  4.6   |  26  |  5.36<H>    Ca    8.4      26 Sep 2019 00:35  Phos  5.4       Mg     2.2         TPro  5.8<L>  /  Alb  3.2<L>  /  TBili  0.2  /  DBili  x   /  AST  15  /  ALT  24  /  AlkPhos  51      Creatinine Trend: 5.36<--, 6.23<--, 5.44<--, 4.38<--, 5.34<--, 7.49<--       19 @ 07:01  -  19 @ 07:00  --------------------------------------------------------  IN: 1660 mL / OUT: 2450 mL / NET: -790 mL    19 @ 07:01  -  19 @ 15:18  --------------------------------------------------------  IN: 680 mL / OUT: 50 mL / NET: 630 mL        PHYSICAL EXAM  Vital Signs Last 24 Hrs  T(C): 36.8 (26 Sep 2019 12:00), Max: 36.8 (25 Sep 2019 20:00)  T(F): 98.3 (26 Sep 2019 12:00), Max: 98.3 (26 Sep 2019 12:00)  HR: 76 (26 Sep 2019 14:00) (67 - 138)  BP: 120/56 (26 Sep 2019 14:00) (74/55 - 120/56)  BP(mean): 81 (26 Sep 2019 14:00) (61 - 81)  RR: 15 (26 Sep 2019 14:00) (15 - 38)  SpO2: 97% (26 Sep 2019 14:00) (92% - 100%)      Gen: NAD  HEENT:  (-)icterus (-)pallor  CV: N S1 S2 1/6 DEJAH (+)2 Pulses B/l  Resp:  Coarse BS B/L, normal effort  GI: (+) BS Soft, NT, ND  Lymph:  (-)Edema, (-)obvious lymphadenopathy  Skin: Warm to touch, Normal turgor  Psych: Appropriate mood and affect    TELEMETRY: NSR 70s, Went into Afib yesterday and converted to NSR this AM      < from: Cardiac Cath Lab - Adult (19 @ 12:28) >  PROCEDURE:  --  Left heart catheterization.  --  Left coronary angiography.  --  Right coronary angiography.  --  Sonosite - Diagnostic.  VENTRICLES: No LV gram was performed; however, a recent echocardiogram  demonstrated normal global and regional LV function.  CORONARY VESSELS: The coronary circulation is right dominant.  LM:   --  Ostial LM: There was a discrete 90 % stenosis.  LAD:   --  Mid LAD: Angiography showed minor luminal irregularities with no  flow limiting lesions. The stent in the mid LAD is patent.  --  Distal LAD: The vessel was small sized. Angiography showed moderate  atherosclerosis.  CX:   --  Mid circumflex: There was a tubular 50 % stenosis at the site of  a prior stent.  RI:   --  Proximal ramus intermedius: There was a diffuse 50 % stenosis at  the site of a prior stent.  RCA:   --  Mid RCA: There was a tubular 50 % stenosis at the site of a  prior stent.  --  RPLS: Angiography showed minor luminal irregularities with no flow  limiting lesions.  COMPLICATIONS: There were no complications.  DIAGNOSTIC RECOMMENDATIONS: Consultation with a cardiac surgeon be obtained  for surgical opinion and coronary artery bypass grafting.  Prepared and signed by  Jessy Méndez M.D.  Signed 2019 13:42:23  HEMODYNAMIC TABLES  Pressures:  NO PHASE  Pressures:  - HR: 69  Pressures:  - Rhythm:  Pressures:  -- Aortic Pressure (S/D/M): 170/70/113  Pressures:  -- Left Ventricle (s/edp): 164/28/--  Outputs:  NO PHASE  Outputs:  -- CALCULATIONS[age]  Outputs:  -- CALCULATIONS: Body Surface Area: 1.92  Outputs:  -- CALCULATIONS: Height in cm: 168.00  Outputs:  -- CALCULATIONS: Sex: Male  Outputs:  -- CALCULATIONS: Weight in k.60  Outputs:  -- OUTPUTS: O2 consumption: 239.38  Outputs:  -- OUTPUTS: Vo2 Indexed: 125.00  < end of copied text >    ASSESSMENT/PLAN: 70 y/o male with PMHx of CAD, HTN, HLD, T2DM, and ESRD on HD presented after abnormal stress test to [facility] for elective cardiac catheterization. Patient admitted post cath after cath revealed severe ostial LM disease. Now transferred to [facility] for CABG. S/p C3L on . Patient developed new onset Afib on .  	   - Continue to monitor tele - currently NSR at time of exam  - Continue ASA, Statin  - On Amio for Afib  - Would start AC for Afib when okay with CTS  - Monitor BP  - Pain control  - HD per renal  - Supportive care per CTU appreciated    Wilfred Robin PA-C  [facility]  Pager: [phone]

## 2019-09-26 NOTE — PROGRESS NOTE ADULT - ASSESSMENT
Assessment  DMT2: 69y Male with DM T2 with hyperglycemia, was on oral meds at home, now on insulin, blood sugars still elevated and not at target, no hypoglycemic episode, postop CABG recovering in CTU, now eating meals.  CAD: Postop CABG 9/23, on medications, no chest pain, stable, monitored.  HTN: Controlled,  on antihypertensive medications.  Hyperthyroidism: On low dose Methimazole. New TFTs WNL: TSH 1.48, Free T4 1.3.  ESRD: Monitor labs/BMP            Yg Mackey MD  Cell: [phone]  Office: [phone] Assessment  DMT2: 69y Male with DM T2 with hyperglycemia, was on oral meds at home,  now on insulin, blood sugars still elevated and not at target, no hypoglycemic episode, postop CABG recovering in CTU, now eating meals.  CAD: Postop CABG 9/23, on medications, no chest pain, stable, monitored.  HTN: Controlled,  on antihypertensive medications.  Hyperthyroidism: On low dose Methimazole. New TFTs WNL: TSH 1.48, Free T4 1.3.  ESRD: Monitor labs/BMP            Yg Mackey MD  Cell: [phone]  Office: [phone]

## 2019-09-27 LAB
GLUCOSE BLDC GLUCOMTR-MCNC: 118 MG/DL — HIGH (ref 70–99)
GLUCOSE BLDC GLUCOMTR-MCNC: 74 MG/DL — SIGNIFICANT CHANGE UP (ref 70–99)
GLUCOSE BLDC GLUCOMTR-MCNC: 77 MG/DL — SIGNIFICANT CHANGE UP (ref 70–99)
GLUCOSE BLDC GLUCOMTR-MCNC: 79 MG/DL — SIGNIFICANT CHANGE UP (ref 70–99)
GLUCOSE BLDC GLUCOMTR-MCNC: 83 MG/DL — SIGNIFICANT CHANGE UP (ref 70–99)

## 2019-09-27 PROCEDURE — 99291 CRITICAL CARE FIRST HOUR: CPT

## 2019-09-27 PROCEDURE — 71045 X-RAY EXAM CHEST 1 VIEW: CPT | Mod: 26

## 2019-09-27 RX ORDER — HEPARIN SODIUM 5000 [USP'U]/ML
700 INJECTION INTRAVENOUS; SUBCUTANEOUS
Qty: 25000 | Refills: 0 | Status: DISCONTINUED | OUTPATIENT
Start: 2019-09-27 | End: 2019-10-01

## 2019-09-27 RX ORDER — METOPROLOL TARTRATE 50 MG
12.5 TABLET ORAL
Refills: 0 | Status: DISCONTINUED | OUTPATIENT
Start: 2019-09-27 | End: 2019-09-28

## 2019-09-27 RX ORDER — INSULIN GLARGINE 100 [IU]/ML
10 INJECTION, SOLUTION SUBCUTANEOUS AT BEDTIME
Refills: 0 | Status: DISCONTINUED | OUTPATIENT
Start: 2019-09-27 | End: 2019-09-28

## 2019-09-27 RX ORDER — HYDROMORPHONE HYDROCHLORIDE 2 MG/ML
0.5 INJECTION INTRAMUSCULAR; INTRAVENOUS; SUBCUTANEOUS ONCE
Refills: 0 | Status: DISCONTINUED | OUTPATIENT
Start: 2019-09-27 | End: 2019-09-27

## 2019-09-27 RX ORDER — WARFARIN SODIUM 2.5 MG/1
5 TABLET ORAL ONCE
Refills: 0 | Status: COMPLETED | OUTPATIENT
Start: 2019-09-27 | End: 2019-09-27

## 2019-09-27 RX ORDER — ATORVASTATIN CALCIUM 80 MG/1
40 TABLET, FILM COATED ORAL AT BEDTIME
Refills: 0 | Status: DISCONTINUED | OUTPATIENT
Start: 2019-09-27 | End: 2019-10-06

## 2019-09-27 RX ORDER — METOPROLOL TARTRATE 50 MG
2.5 TABLET ORAL ONCE
Refills: 0 | Status: COMPLETED | OUTPATIENT
Start: 2019-09-27 | End: 2019-09-27

## 2019-09-27 RX ORDER — AMIODARONE HYDROCHLORIDE 400 MG/1
150 TABLET ORAL ONCE
Refills: 0 | Status: COMPLETED | OUTPATIENT
Start: 2019-09-27 | End: 2019-09-27

## 2019-09-27 RX ADMIN — SODIUM CHLORIDE 3 MILLILITER(S): 9 INJECTION INTRAMUSCULAR; INTRAVENOUS; SUBCUTANEOUS at 01:41

## 2019-09-27 RX ADMIN — KETOTIFEN FUMARATE 1 DROP(S): 0.34 SOLUTION OPHTHALMIC at 17:37

## 2019-09-27 RX ADMIN — OXYCODONE AND ACETAMINOPHEN 2 TABLET(S): 5; 325 TABLET ORAL at 05:46

## 2019-09-27 RX ADMIN — SEVELAMER CARBONATE 800 MILLIGRAM(S): 2400 POWDER, FOR SUSPENSION ORAL at 17:37

## 2019-09-27 RX ADMIN — WARFARIN SODIUM 5 MILLIGRAM(S): 2.5 TABLET ORAL at 21:29

## 2019-09-27 RX ADMIN — Medication 12.5 MILLIGRAM(S): at 17:37

## 2019-09-27 RX ADMIN — OXYCODONE AND ACETAMINOPHEN 2 TABLET(S): 5; 325 TABLET ORAL at 13:45

## 2019-09-27 RX ADMIN — SEVELAMER CARBONATE 800 MILLIGRAM(S): 2400 POWDER, FOR SUSPENSION ORAL at 13:45

## 2019-09-27 RX ADMIN — Medication 100 MILLIGRAM(S): at 13:44

## 2019-09-27 RX ADMIN — SODIUM CHLORIDE 3 MILLILITER(S): 9 INJECTION INTRAMUSCULAR; INTRAVENOUS; SUBCUTANEOUS at 21:36

## 2019-09-27 RX ADMIN — OXYCODONE AND ACETAMINOPHEN 2 TABLET(S): 5; 325 TABLET ORAL at 05:16

## 2019-09-27 RX ADMIN — Medication 2.5 MILLIGRAM(S): at 09:43

## 2019-09-27 RX ADMIN — FAMOTIDINE 20 MILLIGRAM(S): 10 INJECTION INTRAVENOUS at 21:29

## 2019-09-27 RX ADMIN — HYDROMORPHONE HYDROCHLORIDE 0.5 MILLIGRAM(S): 2 INJECTION INTRAMUSCULAR; INTRAVENOUS; SUBCUTANEOUS at 16:42

## 2019-09-27 RX ADMIN — Medication 100 MILLIGRAM(S): at 21:29

## 2019-09-27 RX ADMIN — Medication 100 MILLIGRAM(S): at 05:17

## 2019-09-27 RX ADMIN — KETOTIFEN FUMARATE 1 DROP(S): 0.34 SOLUTION OPHTHALMIC at 05:17

## 2019-09-27 RX ADMIN — OXYCODONE AND ACETAMINOPHEN 2 TABLET(S): 5; 325 TABLET ORAL at 13:44

## 2019-09-27 RX ADMIN — HEPARIN SODIUM 5000 UNIT(S): 5000 INJECTION INTRAVENOUS; SUBCUTANEOUS at 05:17

## 2019-09-27 RX ADMIN — HYDROMORPHONE HYDROCHLORIDE 0.5 MILLIGRAM(S): 2 INJECTION INTRAMUSCULAR; INTRAVENOUS; SUBCUTANEOUS at 17:00

## 2019-09-27 RX ADMIN — SODIUM CHLORIDE 3 MILLILITER(S): 9 INJECTION INTRAMUSCULAR; INTRAVENOUS; SUBCUTANEOUS at 14:10

## 2019-09-27 RX ADMIN — SEVELAMER CARBONATE 800 MILLIGRAM(S): 2400 POWDER, FOR SUSPENSION ORAL at 08:48

## 2019-09-27 RX ADMIN — CHLORHEXIDINE GLUCONATE 1 APPLICATION(S): 213 SOLUTION TOPICAL at 05:17

## 2019-09-27 RX ADMIN — Medication 7 UNIT(S): at 08:48

## 2019-09-27 RX ADMIN — OXYCODONE AND ACETAMINOPHEN 2 TABLET(S): 5; 325 TABLET ORAL at 14:30

## 2019-09-27 RX ADMIN — Medication 2.5 MILLIGRAM(S): at 16:25

## 2019-09-27 RX ADMIN — ATORVASTATIN CALCIUM 40 MILLIGRAM(S): 80 TABLET, FILM COATED ORAL at 21:29

## 2019-09-27 RX ADMIN — Medication 81 MILLIGRAM(S): at 13:44

## 2019-09-27 RX ADMIN — AMIODARONE HYDROCHLORIDE 600 MILLIGRAM(S): 400 TABLET ORAL at 13:10

## 2019-09-27 RX ADMIN — AMIODARONE HYDROCHLORIDE 400 MILLIGRAM(S): 400 TABLET ORAL at 13:44

## 2019-09-27 RX ADMIN — AMIODARONE HYDROCHLORIDE 400 MILLIGRAM(S): 400 TABLET ORAL at 05:16

## 2019-09-27 RX ADMIN — Medication 7 UNIT(S): at 17:47

## 2019-09-27 RX ADMIN — AMIODARONE HYDROCHLORIDE 400 MILLIGRAM(S): 400 TABLET ORAL at 21:29

## 2019-09-27 NOTE — PROGRESS NOTE ADULT - PROBLEM SELECTOR PLAN 1
Will continue current insulin regimen for now; Will closely monitor FS, log, and FU.  Patient counseled for compliance with consistent low carb diet.

## 2019-09-27 NOTE — PROGRESS NOTE ADULT - ASSESSMENT
Assessment  DMT2: 69y Male with DM T2 with hyperglycemia, was on oral meds at home, now on insulin, blood sugars improving, no hypoglycemic episode, postop CABG recovering in CTU, now eating full meals, appears comfortable.  CAD: Postop CABG 9/23, on medications, no chest pain, stable, monitored.  HTN: Controlled, on antihypertensive medications.  Hyperthyroidism: On low dose Methimazole. New TFTs WNL: TSH 1.48, Free T4 1.3.  ESRD: Monitor labs/BMP            Yg Mackey MD  Cell: [phone]  Office: [phone] Assessment  DMT2: 69y Male with DM T2 with hyperglycemia, was on oral meds at home, now on insulin, blood sugars improving, no hypoglycemic  episode, postop CABG recovering in CTU, now eating full meals, appears comfortable.  CAD: Postop CABG 9/23, on medications, no chest pain, stable, monitored.  HTN: Controlled, on antihypertensive medications.  Hyperthyroidism: On low dose Methimazole. New TFTs WNL: TSH 1.48, Free T4 1.3.  ESRD: Monitor labs/BMP            Yg Mackey MD  Cell: [phone]  Office: [phone]

## 2019-09-27 NOTE — PROGRESS NOTE ADULT - SUBJECTIVE AND OBJECTIVE BOX
Chief complaint  Patient is a 69y old  Male who presents with a chief complaint of CABG x 3 L (27 Sep 2019 06:21)   Review of systems  Patient in bed, looks comfortable, no fever, no hypoglycemia.    Labs and Fingersticks  CAPILLARY BLOOD GLUCOSE      POCT Blood Glucose.: 74 mg/dL (27 Sep 2019 13:42)  POCT Blood Glucose.: 79 mg/dL (27 Sep 2019 13:41)  POCT Blood Glucose.: 83 mg/dL (27 Sep 2019 08:17)  POCT Blood Glucose.: 157 mg/dL (26 Sep 2019 21:30)  POCT Blood Glucose.: 205 mg/dL (26 Sep 2019 18:03)      Anion Gap, Serum: 15 (09-27 @ 00:54)  Anion Gap, Serum: 13 (09-26 @ 00:35)      Calcium, Total Serum: 8.7 (09-27 @ 00:54)  Calcium, Total Serum: 8.4 (09-26 @ 00:35)  Albumin, Serum: 3.4 (09-27 @ 00:54)  Albumin, Serum: 3.2 <L> (09-26 @ 00:35)    Alanine Aminotransferase (ALT/SGPT): 23 (09-27 @ 00:54)  Alanine Aminotransferase (ALT/SGPT): 24 (09-26 @ 00:35)  Alkaline Phosphatase, Serum: 55 (09-27 @ 00:54)  Alkaline Phosphatase, Serum: 51 (09-26 @ 00:35)  Aspartate Aminotransferase (AST/SGOT): 12 (09-27 @ 00:54)  Aspartate Aminotransferase (AST/SGOT): 15 (09-26 @ 00:35)        09-27    132<L>  |  92<L>  |  62<H>  ----------------------------<  123<H>  4.3   |  25  |  7.36<H>    Ca    8.7      27 Sep 2019 00:54  Phos  6.5     09-27  Mg     2.4     09-27    TPro  6.1  /  Alb  3.4  /  TBili  0.2  /  DBili  x   /  AST  12  /  ALT  23  /  AlkPhos  55  09-27                        9.2    13.8  )-----------( 157      ( 27 Sep 2019 00:54 )             28.8     Medications  MEDICATIONS  (STANDING):  amiodarone    Tablet 400 milliGRAM(s) Oral every 8 hours  amiodarone    Tablet   Oral   aspirin enteric coated 81 milliGRAM(s) Oral daily  atorvastatin 40 milliGRAM(s) Oral at bedtime  chlorhexidine 2% Cloths 1 Application(s) Topical <User Schedule>  dextrose 5%. 1000 milliLiter(s) (50 mL/Hr) IV Continuous <Continuous>  dextrose 50% Injectable 12.5 Gram(s) IV Push once  dextrose 50% Injectable 25 Gram(s) IV Push once  dextrose 50% Injectable 25 Gram(s) IV Push once  docusate sodium 100 milliGRAM(s) Oral three times a day  famotidine    Tablet 20 milliGRAM(s) Oral at bedtime  heparin  Infusion 700 Unit(s)/Hr (7 mL/Hr) IV Continuous <Continuous>  insulin glargine Injectable (LANTUS) 15 Unit(s) SubCutaneous at bedtime  insulin lispro (HumaLOG) corrective regimen sliding scale   SubCutaneous three times a day before meals  insulin lispro (HumaLOG) corrective regimen sliding scale   SubCutaneous at bedtime  insulin lispro Injectable (HumaLOG) 7 Unit(s) SubCutaneous three times a day before meals  ketotifen 0.025% Ophthalmic Solution 1 Drop(s) Both EYES two times a day  methimazole 5 milliGRAM(s) Oral daily  metoprolol tartrate 12.5 milliGRAM(s) Oral two times a day  sevelamer carbonate 800 milliGRAM(s) Oral three times a day with meals  sodium chloride 0.9% lock flush 3 milliLiter(s) IV Push every 8 hours  sodium chloride 0.9%. 1000 milliLiter(s) (10 mL/Hr) IV Continuous <Continuous>  tiotropium 18 MICROgram(s) Capsule 1 Capsule(s) Inhalation daily  warfarin 5 milliGRAM(s) Oral once      Physical Exam  General: Patient comfortable in bed  Vital Signs Last 12 Hrs  T(F): 96.8 (09-27-19 @ 14:15), Max: 98.8 (09-27-19 @ 04:00)  HR: 109 (09-27-19 @ 14:15) (70 - 117)  BP: 160/70 (09-27-19 @ 14:15) (104/66 - 168/84)  BP(mean): 104 (09-27-19 @ 14:00) (78 - 117)  RR: 20 (09-27-19 @ 14:15) (13 - 26)  SpO2: 99% (09-27-19 @ 14:15) (95% - 99%)  Neck: No palpable thyroid nodules.  CVS: S1S2, No murmurs  Respiratory: No wheezing, no crepitations  GI: Abdomen soft, bowel sounds positive  Musculoskeletal:  edema lower extremities.   Skin: No skin rashes, no ecchymosis    Diagnostics Chief complaint  Patient is a 69y old  Male who presents with a chief complaint of CABG x 3 L (27 Sep 2019 06:21)   Review of systems  Patient in bed, looks comfortable, no fever, no  hypoglycemia.    Labs and Fingersticks  CAPILLARY BLOOD GLUCOSE      POCT Blood Glucose.: 74 mg/dL (27 Sep 2019 13:42)  POCT Blood Glucose.: 79 mg/dL (27 Sep 2019 13:41)  POCT Blood Glucose.: 83 mg/dL (27 Sep 2019 08:17)  POCT Blood Glucose.: 157 mg/dL (26 Sep 2019 21:30)  POCT Blood Glucose.: 205 mg/dL (26 Sep 2019 18:03)      Anion Gap, Serum: 15 (09-27 @ 00:54)  Anion Gap, Serum: 13 (09-26 @ 00:35)      Calcium, Total Serum: 8.7 (09-27 @ 00:54)  Calcium, Total Serum: 8.4 (09-26 @ 00:35)  Albumin, Serum: 3.4 (09-27 @ 00:54)  Albumin, Serum: 3.2 <L> (09-26 @ 00:35)    Alanine Aminotransferase (ALT/SGPT): 23 (09-27 @ 00:54)  Alanine Aminotransferase (ALT/SGPT): 24 (09-26 @ 00:35)  Alkaline Phosphatase, Serum: 55 (09-27 @ 00:54)  Alkaline Phosphatase, Serum: 51 (09-26 @ 00:35)  Aspartate Aminotransferase (AST/SGOT): 12 (09-27 @ 00:54)  Aspartate Aminotransferase (AST/SGOT): 15 (09-26 @ 00:35)        09-27    132<L>  |  92<L>  |  62<H>  ----------------------------<  123<H>  4.3   |  25  |  7.36<H>    Ca    8.7      27 Sep 2019 00:54  Phos  6.5     09-27  Mg     2.4     09-27    TPro  6.1  /  Alb  3.4  /  TBili  0.2  /  DBili  x   /  AST  12  /  ALT  23  /  AlkPhos  55  09-27                        9.2    13.8  )-----------( 157      ( 27 Sep 2019 00:54 )             28.8     Medications  MEDICATIONS  (STANDING):  amiodarone    Tablet 400 milliGRAM(s) Oral every 8 hours  amiodarone    Tablet   Oral   aspirin enteric coated 81 milliGRAM(s) Oral daily  atorvastatin 40 milliGRAM(s) Oral at bedtime  chlorhexidine 2% Cloths 1 Application(s) Topical <User Schedule>  dextrose 5%. 1000 milliLiter(s) (50 mL/Hr) IV Continuous <Continuous>  dextrose 50% Injectable 12.5 Gram(s) IV Push once  dextrose 50% Injectable 25 Gram(s) IV Push once  dextrose 50% Injectable 25 Gram(s) IV Push once  docusate sodium 100 milliGRAM(s) Oral three times a day  famotidine    Tablet 20 milliGRAM(s) Oral at bedtime  heparin  Infusion 700 Unit(s)/Hr (7 mL/Hr) IV Continuous <Continuous>  insulin glargine Injectable (LANTUS) 15 Unit(s) SubCutaneous at bedtime  insulin lispro (HumaLOG) corrective regimen sliding scale   SubCutaneous three times a day before meals  insulin lispro (HumaLOG) corrective regimen sliding scale   SubCutaneous at bedtime  insulin lispro Injectable (HumaLOG) 7 Unit(s) SubCutaneous three times a day before meals  ketotifen 0.025% Ophthalmic Solution 1 Drop(s) Both EYES two times a day  methimazole 5 milliGRAM(s) Oral daily  metoprolol tartrate 12.5 milliGRAM(s) Oral two times a day  sevelamer carbonate 800 milliGRAM(s) Oral three times a day with meals  sodium chloride 0.9% lock flush 3 milliLiter(s) IV Push every 8 hours  sodium chloride 0.9%. 1000 milliLiter(s) (10 mL/Hr) IV Continuous <Continuous>  tiotropium 18 MICROgram(s) Capsule 1 Capsule(s) Inhalation daily  warfarin 5 milliGRAM(s) Oral once      Physical Exam  General: Patient comfortable in bed  Vital Signs Last 12 Hrs  T(F): 96.8 (09-27-19 @ 14:15), Max: 98.8 (09-27-19 @ 04:00)  HR: 109 (09-27-19 @ 14:15) (70 - 117)  BP: 160/70 (09-27-19 @ 14:15) (104/66 - 168/84)  BP(mean): 104 (09-27-19 @ 14:00) (78 - 117)  RR: 20 (09-27-19 @ 14:15) (13 - 26)  SpO2: 99% (09-27-19 @ 14:15) (95% - 99%)  Neck: No palpable thyroid nodules.  CVS: S1S2, No murmurs  Respiratory: No wheezing, no crepitations  GI: Abdomen soft, bowel sounds positive  Musculoskeletal:  edema lower extremities.   Skin: No skin rashes, no ecchymosis    Diagnostics

## 2019-09-27 NOTE — PROGRESS NOTE ADULT - ATTENDING COMMENTS
Agree with above assessment and plan as outlined above.    - Post op afib  - EP eval Dr. Archuleta called    Murphy Colin MD, [facility]  BEEPER [phone]

## 2019-09-27 NOTE — PROGRESS NOTE ADULT - SUBJECTIVE AND OBJECTIVE BOX
CRITICAL CARE ATTENDING - CTICU    MEDICATIONS  (STANDING):  amiodarone    Tablet 400 milliGRAM(s) Oral every 8 hours  amiodarone    Tablet   Oral   aspirin enteric coated 81 milliGRAM(s) Oral daily  chlorhexidine 2% Cloths 1 Application(s) Topical <User Schedule>  dextrose 5%. 1000 milliLiter(s) (50 mL/Hr) IV Continuous <Continuous>  dextrose 50% Injectable 12.5 Gram(s) IV Push once  dextrose 50% Injectable 25 Gram(s) IV Push once  dextrose 50% Injectable 25 Gram(s) IV Push once  docusate sodium 100 milliGRAM(s) Oral three times a day  famotidine    Tablet 20 milliGRAM(s) Oral at bedtime  heparin  Injectable 5000 Unit(s) SubCutaneous every 8 hours  insulin glargine Injectable (LANTUS) 15 Unit(s) SubCutaneous at bedtime  insulin lispro (HumaLOG) corrective regimen sliding scale   SubCutaneous three times a day before meals  insulin lispro (HumaLOG) corrective regimen sliding scale   SubCutaneous at bedtime  insulin lispro Injectable (HumaLOG) 7 Unit(s) SubCutaneous three times a day before meals  ketotifen 0.025% Ophthalmic Solution 1 Drop(s) Both EYES two times a day  methimazole 5 milliGRAM(s) Oral daily  sevelamer carbonate 800 milliGRAM(s) Oral three times a day with meals  simvastatin 40 milliGRAM(s) Oral at bedtime  sodium chloride 0.9% lock flush 3 milliLiter(s) IV Push every 8 hours  sodium chloride 0.9%. 1000 milliLiter(s) (10 mL/Hr) IV Continuous <Continuous>  tiotropium 18 MICROgram(s) Capsule 1 Capsule(s) Inhalation daily                                    9.2    13.8  )-----------( 157      ( 27 Sep 2019 00:54 )             28.8       09    132<L>  |  92<L>  |  62<H>  ----------------------------<  123<H>  4.3   |  25  |  7.36<H>    Ca    8.7      27 Sep 2019 00:54  Phos  6.5       Mg     2.4         TPro  6.1  /  Alb  3.4  /  TBili  0.2  /  DBili  x   /  AST  12  /  ALT  23  /  AlkPhos  55        PT/INR - ( 27 Sep 2019 00:54 )   PT: 13.7 sec;   INR: 1.20 ratio         PTT - ( 27 Sep 2019 00:54 )  PTT:24.9 sec        Daily     Daily Weight in k.8 (27 Sep 2019 01:00)       @ 07: @ 07:00  --------------------------------------------------------  IN: 1660 mL / OUT: 2450 mL / NET: -790 mL     @ 07: @ 06:21  --------------------------------------------------------  IN: 1030 mL / OUT: 100 mL / NET: 930 mL        Critically Ill patient  : [ ] preoperative ,   [x] post operative    Requires :  [ ] Arterial Line   [ ] Central Line  [ ] PA catheter  [ ] IABP  [ ] ECMO  [ ] LVAD  [ ] Ventilator  [ ] pacemaker [ ] Impella.                      [ ] ABG's     [ ] Pulse Oxymetry Monitoring  Bedside evaluation , monitoring , treatment of hemodynamics , fluids , IVP/ IVCD meds.        Diagnosis:     POD 4 - CABG x 3 L    Chronic Renal Failure - End Stage Renal Disease   Had dialysis yesterday and had 1.5    hypotension    Hypovolemia     [x ] Hemodialysis [ ] Hemofiltration:    [ x] negative fluid balance [ ] even fluid balance [ ] positive fluid balance, in a hemodynamically unstable patient.     Thrombocytopenia     A Fib / Tachycardia   Converted this morning at 0630 to NSR.    Hyperthyroid     Old CVA     DM     Hypotension a/w Hypertension, requiring intravenous medication.                   Discussed with CT surgeon, Physician's Assistant - Nurse Practitioner- Critical care medicine team.   Dicussed at  AM / PM rounds.   Chart, labs , films reviewed.    Total Time: CRITICAL CARE ATTENDING - CTICU    MEDICATIONS  (STANDING):  amiodarone    Tablet 400 milliGRAM(s) Oral every 8 hours  amiodarone    Tablet   Oral   aspirin enteric coated 81 milliGRAM(s) Oral daily  chlorhexidine 2% Cloths 1 Application(s) Topical <User Schedule>  dextrose 5%. 1000 milliLiter(s) (50 mL/Hr) IV Continuous <Continuous>  dextrose 50% Injectable 12.5 Gram(s) IV Push once  dextrose 50% Injectable 25 Gram(s) IV Push once  dextrose 50% Injectable 25 Gram(s) IV Push once  docusate sodium 100 milliGRAM(s) Oral three times a day  famotidine    Tablet 20 milliGRAM(s) Oral at bedtime  heparin  Injectable 5000 Unit(s) SubCutaneous every 8 hours  insulin glargine Injectable (LANTUS) 15 Unit(s) SubCutaneous at bedtime  insulin lispro (HumaLOG) corrective regimen sliding scale   SubCutaneous three times a day before meals  insulin lispro (HumaLOG) corrective regimen sliding scale   SubCutaneous at bedtime  insulin lispro Injectable (HumaLOG) 7 Unit(s) SubCutaneous three times a day before meals  ketotifen 0.025% Ophthalmic Solution 1 Drop(s) Both EYES two times a day  methimazole 5 milliGRAM(s) Oral daily  sevelamer carbonate 800 milliGRAM(s) Oral three times a day with meals  simvastatin 40 milliGRAM(s) Oral at bedtime  sodium chloride 0.9% lock flush 3 milliLiter(s) IV Push every 8 hours  sodium chloride 0.9%. 1000 milliLiter(s) (10 mL/Hr) IV Continuous <Continuous>  tiotropium 18 MICROgram(s) Capsule 1 Capsule(s) Inhalation daily                                    9.2    13.8  )-----------( 157      ( 27 Sep 2019 00:54 )             28.8       09    132<L>  |  92<L>  |  62<H>  ----------------------------<  123<H>  4.3   |  25  |  7.36<H>    Ca    8.7      27 Sep 2019 00:54  Phos  6.5       Mg     2.4         TPro  6.1  /  Alb  3.4  /  TBili  0.2  /  DBili  x   /  AST  12  /  ALT  23  /  AlkPhos  55        PT/INR - ( 27 Sep 2019 00:54 )   PT: 13.7 sec;   INR: 1.20 ratio         PTT - ( 27 Sep 2019 00:54 )  PTT:24.9 sec        Daily     Daily Weight in k.8 (27 Sep 2019 01:00)       @ 07: @ 07:00  --------------------------------------------------------  IN: 1660 mL / OUT: 2450 mL / NET: -790 mL     @ 07: @ 06:21  --------------------------------------------------------  IN: 1030 mL / OUT: 100 mL / NET: 930 mL        Critically Ill patient  : [ ] preoperative ,   [x] post operative    Requires :  [ x] Arterial Line   [x ] Central Line  [ ] PA catheter  [ ] IABP  [ ] ECMO  [ ] LVAD  [ ] Ventilator  [x ] pacemaker [ ] Impella.                      [x ] ABG's     [ x] Pulse Oxymetry Monitoring  Bedside evaluation , monitoring , treatment of hemodynamics , fluids , IVP/ IVCD meds.        Diagnosis:     POD 4 - CABG x 3 L    Chronic Renal Failure - End Stage Renal Disease   Had dialysis yesterday and had 1.5    hypotension    Hypovolemia     [x ] Hemodialysis [ ] Hemofiltration:    [ x] negative fluid balance [ ] even fluid balance [ ] positive fluid balance, in a hemodynamically unstable patient.     Thrombocytopenia     A Fib / Tachycardia   Converted this morning at 0630 to NSR.    Hyperthyroid     Old CVA     DM     Hypotension a/w Hypertension, requiring intravenous medication.     Plan: Dialysis today. Continue monitoring.            Discussed with CT surgeon, Physician's Assistant - Nurse Practitioner- Critical care medicine team.   Dicussed at  AM / PM rounds.   Chart, labs , films reviewed.    Total Time: CRITICAL CARE ATTENDING - CTICU    MEDICATIONS  (STANDING):  amiodarone    Tablet 400 milliGRAM(s) Oral every 8 hours  amiodarone    Tablet   Oral   aspirin enteric coated 81 milliGRAM(s) Oral daily  chlorhexidine 2% Cloths 1 Application(s) Topical <User Schedule>  dextrose 5%. 1000 milliLiter(s) (50 mL/Hr) IV Continuous <Continuous>  dextrose 50% Injectable 12.5 Gram(s) IV Push once  dextrose 50% Injectable 25 Gram(s) IV Push once  dextrose 50% Injectable 25 Gram(s) IV Push once  docusate sodium 100 milliGRAM(s) Oral three times a day  famotidine    Tablet 20 milliGRAM(s) Oral at bedtime  heparin  Injectable 5000 Unit(s) SubCutaneous every 8 hours  insulin glargine Injectable (LANTUS) 15 Unit(s) SubCutaneous at bedtime  insulin lispro (HumaLOG) corrective regimen sliding scale   SubCutaneous three times a day before meals  insulin lispro (HumaLOG) corrective regimen sliding scale   SubCutaneous at bedtime  insulin lispro Injectable (HumaLOG) 7 Unit(s) SubCutaneous three times a day before meals  ketotifen 0.025% Ophthalmic Solution 1 Drop(s) Both EYES two times a day  methimazole 5 milliGRAM(s) Oral daily  sevelamer carbonate 800 milliGRAM(s) Oral three times a day with meals  simvastatin 40 milliGRAM(s) Oral at bedtime  sodium chloride 0.9% lock flush 3 milliLiter(s) IV Push every 8 hours  sodium chloride 0.9%. 1000 milliLiter(s) (10 mL/Hr) IV Continuous <Continuous>  tiotropium 18 MICROgram(s) Capsule 1 Capsule(s) Inhalation daily                                    9.2    13.8  )-----------( 157      ( 27 Sep 2019 00:54 )             28.8       09    132<L>  |  92<L>  |  62<H>  ----------------------------<  123<H>  4.3   |  25  |  7.36<H>    Ca    8.7      27 Sep 2019 00:54  Phos  6.5       Mg     2.4         TPro  6.1  /  Alb  3.4  /  TBili  0.2  /  DBili  x   /  AST  12  /  ALT  23  /  AlkPhos  55        PT/INR - ( 27 Sep 2019 00:54 )   PT: 13.7 sec;   INR: 1.20 ratio         PTT - ( 27 Sep 2019 00:54 )  PTT:24.9 sec        Daily     Daily Weight in k.8 (27 Sep 2019 01:00)       @ 07: @ 07:00  --------------------------------------------------------  IN: 1660 mL / OUT: 2450 mL / NET: -790 mL     @ 07: @ 06:21  --------------------------------------------------------  IN: 1030 mL / OUT: 100 mL / NET: 930 mL        Critically Ill patient  : [ ] preoperative ,   [x] post operative    Requires :  [ x] Arterial Line   [x ] Central Line  [ ] PA catheter  [ ] IABP  [ ] ECMO  [ ] LVAD  [ ] Ventilator  [x ] pacemaker [ ] Impella.                      [x ] ABG's     [ x] Pulse Oxymetry Monitoring  Bedside evaluation , monitoring , treatment of hemodynamics , fluids , IVP/ IVCD meds.        Diagnosis:     POD 4 - CABG x 3 L    Chronic Renal Failure - End Stage Renal Disease     Obesity OHS / AMY     fluid overload     hypotension    Hemodynamic lability,instability. Requires IVCD [ ] vasopressors [ ] inotropes  [ ] vasodilator  [ x]IVSS fluid  to maintain MAP, perfusion, C.I.     [x ] Hemodialysis [ ] Hemofiltration:    [ x] negative fluid balance [ ] even fluid balance [ ] positive fluid balance, in a hemodynamically unstable patient.     Thrombocytopenia     A Fib / Tachycardia     Hyperthyroid     Old CVA     DM     Hypotension a/w Hypertension, requiring intravenous medication.     Plan: Dialysis today. MAP evaluation             Discussed with CT surgeon, Physician's Assistant - Nurse Practitioner- Critical care medicine team.   Dicussed at  AM / PM rounds.   Chart, labs , films reviewed.    Total Time:

## 2019-09-27 NOTE — CONSULT NOTE ADULT - SUBJECTIVE AND OBJECTIVE BOX
EP ATTENDING      HISTORY OF PRESENT ILLNESS: He is a pleasant 70 y/o male PMH ESRD on HD and CAD s/p multiple PCIs from 4734-0777. He now is s/p a CABG, and had post-op AF. Review of all EKGs demonstrates no evidence of pre-existing AF. His LVEF remains normal. He denies palpitation, syncope, nor angina.    PMH: as above, ESRD, CAD, CVA, hyperlipidemia, DM, HTN    PShx: left arm AVF, multiple PCIs, cataract surgery, CABG    MEDICATIONS  (STANDING):  amiodarone    Tablet 400 milliGRAM(s) Oral every 8 hours  amiodarone    Tablet   Oral   aspirin enteric coated 81 milliGRAM(s) Oral daily  atorvastatin 40 milliGRAM(s) Oral at bedtime  chlorhexidine 2% Cloths 1 Application(s) Topical <User Schedule>  dextrose 5%. 1000 milliLiter(s) (50 mL/Hr) IV Continuous <Continuous>  dextrose 50% Injectable 12.5 Gram(s) IV Push once  dextrose 50% Injectable 25 Gram(s) IV Push once  dextrose 50% Injectable 25 Gram(s) IV Push once  docusate sodium 100 milliGRAM(s) Oral three times a day  famotidine    Tablet 20 milliGRAM(s) Oral at bedtime  heparin  Infusion 700 Unit(s)/Hr (7 mL/Hr) IV Continuous <Continuous>  insulin glargine Injectable (LANTUS) 10 Unit(s) SubCutaneous at bedtime  insulin lispro (HumaLOG) corrective regimen sliding scale   SubCutaneous three times a day before meals  insulin lispro (HumaLOG) corrective regimen sliding scale   SubCutaneous at bedtime  insulin lispro Injectable (HumaLOG) 7 Unit(s) SubCutaneous three times a day before meals  ketotifen 0.025% Ophthalmic Solution 1 Drop(s) Both EYES two times a day  methimazole 5 milliGRAM(s) Oral daily  metoprolol tartrate 12.5 milliGRAM(s) Oral two times a day  sevelamer carbonate 800 milliGRAM(s) Oral three times a day with meals  sodium chloride 0.9% lock flush 3 milliLiter(s) IV Push every 8 hours  sodium chloride 0.9%. 1000 milliLiter(s) (10 mL/Hr) IV Continuous <Continuous>  tiotropium 18 MICROgram(s) Capsule 1 Capsule(s) Inhalation daily  warfarin 5 milliGRAM(s) Oral once      Allergies    lisinopril (Other)    Intolerances        FAMILY HISTORY:  Family history of coronary artery disease  Family history of diabetes mellitus (Sibling)    Non-contributary for premature coronary disease or sudden cardiac death    SOCIAL HISTORY:    [x ] Non-smoker  [ ] Smoker  [ ] Alcohol      REVIEW OF SYSTEMS:  [ ]chest pain  [ x ]shortness of breath  [  ]palpitations  [  ]syncope  [ ]near syncope [ ]upper extremity weakness   [ ] lower extremity weakness  [  ]diplopia  [  ]altered mental status   [  ]fevers  [ ]chills [ ]nausea  [ ]vomitting  [  ]dysphagia    [ ]abdominal pain  [ ]melena  [ ]BRBPR    [  ]epistaxis  [  ]rash    [ ]lower extremity edema        [x ] All others negative	  [ ] Unable to obtain    PHYSICAL EXAM:  T(C): 36.5 (09-27-19 @ 16:00), Max: 37.1 (09-27-19 @ 00:00)  HR: 67 (09-27-19 @ 19:00) (67 - 117)  BP: 123/71 (09-27-19 @ 19:00) (104/66 - 168/84)  RR: 20 (09-27-19 @ 19:00) (13 - 26)  SpO2: 97% (09-27-19 @ 19:00) (95% - 99%)  Wt(kg): --    Appearance: Normal	  HEENT:   Normal oral mucosa, PERRL, EOMI	  Lymphatic: No lymphadenopathy , no edema  IRR, no murmurs  Respiratory: Lungs clear to auscultation, normal effort 	  Gastrointestinal:  Soft, Non-tender, + BS	  Skin: No rashes, No ecchymoses, No cyanosis, warm to touch  Musculoskeletal: Normal range of motion, normal strength  Psychiatry:  Mood & affect appropriate      TELEMETRY: 	    ECG:  	    Echo:  NST:  Cath:  	  	  LABS:	 	                          9.2    13.8  )-----------( 157      ( 27 Sep 2019 00:54 )             28.8     09-27    132<L>  |  92<L>  |  62<H>  ----------------------------<  123<H>  4.3   |  25  |  7.36<H>    Ca    8.7      27 Sep 2019 00:54  Phos  6.5     09-27  Mg     2.4     09-27    TPro  6.1  /  Alb  3.4  /  TBili  0.2  /  DBili  x   /  AST  12  /  ALT  23  /  AlkPhos  55  09-27    proBNP:   Lipid Profile:   HgA1c:   TSH:     A/P) He is a pleasant 70 y/o male PMH ESRD on HD and CAD s/p multiple PCIs from 5759-8776. He now is s/p a CABG, and had post-op AF. Review of all EKGs demonstrates no evidence of pre-existing AF. His LVEF remains normal. He denies palpitation, syncope, nor angina.    -agree with 4-6 weeks of coumadin/amio  -AF should self terminate within 48 hours  -will follow    Alison Archuleta M.D., [facility]  Cardiac Electrophysiology  [facility]  [address] [place] [address], [place] [address]  [email or web address]    office [phone]  pager [phone]

## 2019-09-27 NOTE — PROGRESS NOTE ADULT - ASSESSMENT
69 y.o. male with pmh of ESRD( HD on tu/Th/sa) at [facility] via LUE AVF. Last dialyzed yesterday 9/14, was sent to [facility] for cath after presenting with SOB and a positive stress tests/p CABG 9/23/19 with new onset afib ;Renal following for ESRD Mx.    labs, chart reviewed

## 2019-09-27 NOTE — PROGRESS NOTE ADULT - PROBLEM SELECTOR PLAN 1
Pt seen and examined on HD via avf- good bfr- bp stable- uf 1kg as tolerated  will plan for routine HD tomm to place back on TTS schedule  trend bmp

## 2019-09-27 NOTE — PROGRESS NOTE ADULT - SUBJECTIVE AND OBJECTIVE BOX
Patient seen and examined  no complaints      lisinopril (Other)    Hospital Medications:   MEDICATIONS  (STANDING):  amiodarone    Tablet 400 milliGRAM(s) Oral every 8 hours  amiodarone    Tablet   Oral   aspirin enteric coated 81 milliGRAM(s) Oral daily  atorvastatin 40 milliGRAM(s) Oral at bedtime  chlorhexidine 2% Cloths 1 Application(s) Topical <User Schedule>  dextrose 5%. 1000 milliLiter(s) (50 mL/Hr) IV Continuous <Continuous>  dextrose 50% Injectable 12.5 Gram(s) IV Push once  dextrose 50% Injectable 25 Gram(s) IV Push once  dextrose 50% Injectable 25 Gram(s) IV Push once  docusate sodium 100 milliGRAM(s) Oral three times a day  famotidine    Tablet 20 milliGRAM(s) Oral at bedtime  heparin  Injectable 5000 Unit(s) SubCutaneous every 8 hours  insulin glargine Injectable (LANTUS) 15 Unit(s) SubCutaneous at bedtime  insulin lispro (HumaLOG) corrective regimen sliding scale   SubCutaneous three times a day before meals  insulin lispro (HumaLOG) corrective regimen sliding scale   SubCutaneous at bedtime  insulin lispro Injectable (HumaLOG) 7 Unit(s) SubCutaneous three times a day before meals  ketotifen 0.025% Ophthalmic Solution 1 Drop(s) Both EYES two times a day  methimazole 5 milliGRAM(s) Oral daily  sevelamer carbonate 800 milliGRAM(s) Oral three times a day with meals  sodium chloride 0.9% lock flush 3 milliLiter(s) IV Push every 8 hours  sodium chloride 0.9%. 1000 milliLiter(s) (10 mL/Hr) IV Continuous <Continuous>  tiotropium 18 MICROgram(s) Capsule 1 Capsule(s) Inhalation daily        VITALS:  T(F): 98.4 (09-27-19 @ 12:00), Max: 98.8 (09-27-19 @ 00:00)  HR: 111 (09-27-19 @ 12:00)  BP: 104/66 (09-27-19 @ 12:00)  RR: 17 (09-27-19 @ 12:00)  SpO2: 98% (09-27-19 @ 12:00)  Wt(kg): --    09-26 @ 07:01  -  09-27 @ 07:00  --------------------------------------------------------  IN: 1040 mL / OUT: 100 mL / NET: 940 mL    09-27 @ 07:01  -  09-27 @ 12:27  --------------------------------------------------------  IN: 650 mL / OUT: 0 mL / NET: 650 mL      Physical Exam :-  Constitutional: NAD  Neck: Supple.  Respiratory: Bilateral equal breath sounds,  Cardiovascular: S1, S2 normal,  Gastrointestinal: Bowel Sounds present, soft, non tender.  Extremities: No edema  Neurological: Alert and Oriented x 3, no focal deficits  Psychiatric: Normal mood, normal affect    LABS:  09-27    132<L>  |  92<L>  |  62<H>  ----------------------------<  123<H>  4.3   |  25  |  7.36<H>    Ca    8.7      27 Sep 2019 00:54  Phos  6.5     09-27  Mg     2.4     09-27    TPro  6.1  /  Alb  3.4  /  TBili  0.2  /  DBili      /  AST  12  /  ALT  23  /  AlkPhos  55  09-27    Creatinine Trend: 7.36 <--, 5.36 <--, 6.23 <--, 5.44 <--, 4.38 <--, 5.34 <--, 7.49 <--                        9.2    13.8  )-----------( 157      ( 27 Sep 2019 00:54 )             28.8     Urine Studies:      RADIOLOGY & ADDITIONAL STUDIES:

## 2019-09-28 DIAGNOSIS — R41.0 DISORIENTATION, UNSPECIFIED: ICD-10-CM

## 2019-09-28 DIAGNOSIS — I48.91 UNSPECIFIED ATRIAL FIBRILLATION: ICD-10-CM

## 2019-09-28 LAB
APTT BLD: 54.5 SEC — HIGH (ref 27.5–36.3)
APTT BLD: 54.8 SEC — HIGH (ref 27.5–36.3)
GLUCOSE BLDC GLUCOMTR-MCNC: 106 MG/DL — HIGH (ref 70–99)
GLUCOSE BLDC GLUCOMTR-MCNC: 149 MG/DL — HIGH (ref 70–99)
GLUCOSE BLDC GLUCOMTR-MCNC: 155 MG/DL — HIGH (ref 70–99)
GLUCOSE BLDC GLUCOMTR-MCNC: 91 MG/DL — SIGNIFICANT CHANGE UP (ref 70–99)
GLUCOSE BLDC GLUCOMTR-MCNC: 96 MG/DL — SIGNIFICANT CHANGE UP (ref 70–99)
GLUCOSE BLDC GLUCOMTR-MCNC: 98 MG/DL — SIGNIFICANT CHANGE UP (ref 70–99)
INR BLD: 1.28 RATIO — HIGH (ref 0.88–1.16)
PROTHROM AB SERPL-ACNC: 14.8 SEC — HIGH (ref 10–12.9)

## 2019-09-28 PROCEDURE — 99291 CRITICAL CARE FIRST HOUR: CPT

## 2019-09-28 PROCEDURE — 71045 X-RAY EXAM CHEST 1 VIEW: CPT | Mod: 26

## 2019-09-28 RX ORDER — OXYCODONE AND ACETAMINOPHEN 5; 325 MG/1; MG/1
1 TABLET ORAL EVERY 4 HOURS
Refills: 0 | Status: DISCONTINUED | OUTPATIENT
Start: 2019-09-28 | End: 2019-09-28

## 2019-09-28 RX ORDER — INSULIN GLARGINE 100 [IU]/ML
10 INJECTION, SOLUTION SUBCUTANEOUS AT BEDTIME
Refills: 0 | Status: DISCONTINUED | OUTPATIENT
Start: 2019-09-28 | End: 2019-09-29

## 2019-09-28 RX ORDER — WARFARIN SODIUM 2.5 MG/1
5 TABLET ORAL ONCE
Refills: 0 | Status: COMPLETED | OUTPATIENT
Start: 2019-09-28 | End: 2019-09-28

## 2019-09-28 RX ORDER — INSULIN LISPRO 100/ML
3 VIAL (ML) SUBCUTANEOUS
Refills: 0 | Status: DISCONTINUED | OUTPATIENT
Start: 2019-09-28 | End: 2019-09-29

## 2019-09-28 RX ORDER — METOPROLOL TARTRATE 50 MG
5 TABLET ORAL ONCE
Refills: 0 | Status: COMPLETED | OUTPATIENT
Start: 2019-09-28 | End: 2019-09-28

## 2019-09-28 RX ORDER — ACETAMINOPHEN 500 MG
650 TABLET ORAL EVERY 6 HOURS
Refills: 0 | Status: DISCONTINUED | OUTPATIENT
Start: 2019-09-28 | End: 2019-10-06

## 2019-09-28 RX ORDER — METOPROLOL TARTRATE 50 MG
25 TABLET ORAL
Refills: 0 | Status: DISCONTINUED | OUTPATIENT
Start: 2019-09-28 | End: 2019-09-28

## 2019-09-28 RX ORDER — METOPROLOL TARTRATE 50 MG
25 TABLET ORAL EVERY 8 HOURS
Refills: 0 | Status: DISCONTINUED | OUTPATIENT
Start: 2019-09-28 | End: 2019-10-01

## 2019-09-28 RX ADMIN — FAMOTIDINE 20 MILLIGRAM(S): 10 INJECTION INTRAVENOUS at 22:21

## 2019-09-28 RX ADMIN — SEVELAMER CARBONATE 800 MILLIGRAM(S): 2400 POWDER, FOR SUSPENSION ORAL at 17:39

## 2019-09-28 RX ADMIN — HEPARIN SODIUM 11 UNIT(S)/HR: 5000 INJECTION INTRAVENOUS; SUBCUTANEOUS at 08:30

## 2019-09-28 RX ADMIN — Medication 3 UNIT(S): at 17:39

## 2019-09-28 RX ADMIN — Medication 5 MILLIGRAM(S): at 16:00

## 2019-09-28 RX ADMIN — SODIUM CHLORIDE 3 MILLILITER(S): 9 INJECTION INTRAMUSCULAR; INTRAVENOUS; SUBCUTANEOUS at 07:28

## 2019-09-28 RX ADMIN — Medication 81 MILLIGRAM(S): at 12:39

## 2019-09-28 RX ADMIN — Medication 25 MILLIGRAM(S): at 13:15

## 2019-09-28 RX ADMIN — Medication 100 MILLIGRAM(S): at 21:59

## 2019-09-28 RX ADMIN — AMIODARONE HYDROCHLORIDE 400 MILLIGRAM(S): 400 TABLET ORAL at 06:23

## 2019-09-28 RX ADMIN — KETOTIFEN FUMARATE 1 DROP(S): 0.34 SOLUTION OPHTHALMIC at 21:59

## 2019-09-28 RX ADMIN — KETOTIFEN FUMARATE 1 DROP(S): 0.34 SOLUTION OPHTHALMIC at 06:23

## 2019-09-28 RX ADMIN — SODIUM CHLORIDE 3 MILLILITER(S): 9 INJECTION INTRAMUSCULAR; INTRAVENOUS; SUBCUTANEOUS at 14:46

## 2019-09-28 RX ADMIN — ATORVASTATIN CALCIUM 40 MILLIGRAM(S): 80 TABLET, FILM COATED ORAL at 21:59

## 2019-09-28 RX ADMIN — Medication 100 MILLIGRAM(S): at 06:23

## 2019-09-28 RX ADMIN — AMIODARONE HYDROCHLORIDE 400 MILLIGRAM(S): 400 TABLET ORAL at 21:59

## 2019-09-28 RX ADMIN — SEVELAMER CARBONATE 800 MILLIGRAM(S): 2400 POWDER, FOR SUSPENSION ORAL at 09:09

## 2019-09-28 RX ADMIN — Medication 25 MILLIGRAM(S): at 21:59

## 2019-09-28 RX ADMIN — AMIODARONE HYDROCHLORIDE 400 MILLIGRAM(S): 400 TABLET ORAL at 13:14

## 2019-09-28 RX ADMIN — Medication 12.5 MILLIGRAM(S): at 06:22

## 2019-09-28 RX ADMIN — CHLORHEXIDINE GLUCONATE 1 APPLICATION(S): 213 SOLUTION TOPICAL at 07:27

## 2019-09-28 RX ADMIN — SODIUM CHLORIDE 3 MILLILITER(S): 9 INJECTION INTRAMUSCULAR; INTRAVENOUS; SUBCUTANEOUS at 21:59

## 2019-09-28 RX ADMIN — WARFARIN SODIUM 5 MILLIGRAM(S): 2.5 TABLET ORAL at 21:59

## 2019-09-28 RX ADMIN — INSULIN GLARGINE 10 UNIT(S): 100 INJECTION, SOLUTION SUBCUTANEOUS at 21:59

## 2019-09-28 RX ADMIN — Medication 5 MILLIGRAM(S): at 13:00

## 2019-09-28 RX ADMIN — Medication 1: at 17:39

## 2019-09-28 RX ADMIN — Medication 100 MILLIGRAM(S): at 13:13

## 2019-09-28 NOTE — PROGRESS NOTE ADULT - SUBJECTIVE AND OBJECTIVE BOX
S: Seen during HD. Has intermittent SOB when exerting himself. Currently comfortable on nasal cannula. Denies chest pain, palpitations, or SOB at rest. Review of systems otherwise (-)      ALBUTerol/ipratropium for Nebulization 3 milliLiter(s) Nebulizer every 6 hours PRN  amiodarone    Tablet 400 milliGRAM(s) Oral every 8 hours  amiodarone    Tablet   Oral   aspirin enteric coated 81 milliGRAM(s) Oral daily  atorvastatin 40 milliGRAM(s) Oral at bedtime  chlorhexidine 2% Cloths 1 Application(s) Topical <User Schedule>  dextrose 40% Gel 15 Gram(s) Oral once PRN  dextrose 5%. 1000 milliLiter(s) IV Continuous <Continuous>  dextrose 50% Injectable 12.5 Gram(s) IV Push once  dextrose 50% Injectable 25 Gram(s) IV Push once  dextrose 50% Injectable 25 Gram(s) IV Push once  docusate sodium 100 milliGRAM(s) Oral three times a day  famotidine    Tablet 20 milliGRAM(s) Oral at bedtime  glucagon  Injectable 1 milliGRAM(s) IntraMuscular once PRN  heparin  Infusion 700 Unit(s)/Hr IV Continuous <Continuous>  insulin glargine Injectable (LANTUS) 10 Unit(s) SubCutaneous at bedtime  insulin lispro (HumaLOG) corrective regimen sliding scale   SubCutaneous three times a day before meals  insulin lispro (HumaLOG) corrective regimen sliding scale   SubCutaneous at bedtime  insulin lispro Injectable (HumaLOG) 7 Unit(s) SubCutaneous three times a day before meals  ketotifen 0.025% Ophthalmic Solution 1 Drop(s) Both EYES two times a day  methimazole 5 milliGRAM(s) Oral daily  metoprolol tartrate 25 milliGRAM(s) Oral two times a day  oxyCODONE    5 mG/acetaminophen 325 mG 1 Tablet(s) Oral every 6 hours PRN  oxyCODONE    5 mG/acetaminophen 325 mG 2 Tablet(s) Oral every 6 hours PRN  sevelamer carbonate 800 milliGRAM(s) Oral three times a day with meals  sodium chloride 0.9% lock flush 3 milliLiter(s) IV Push every 8 hours  sodium chloride 0.9%. 1000 milliLiter(s) IV Continuous <Continuous>  tiotropium 18 MICROgram(s) Capsule 1 Capsule(s) Inhalation daily                            8.9    13.9  )-----------( 163      ( 28 Sep 2019 02:26 )             26.5       Hemoglobin: 8.9 g/dL ( @ 02:26)  Hemoglobin: 9.2 g/dL ( @ 00:54)  Hemoglobin: 9.1 g/dL ( @ 00:35)  Hemoglobin: 10.1 g/dL ( @ 02:14)  Hemoglobin: 11.0 g/dL ( @ 00:48)          132<L>  |  91<L>  |  46<H>  ----------------------------<  103<H>  4.2   |  25  |  6.08<H>    Ca    8.6      28 Sep 2019 02:26  Phos  5.1       Mg     2.2         TPro  5.9<L>  /  Alb  3.0<L>  /  TBili  0.3  /  DBili  x   /  AST  16  /  ALT  21  /  AlkPhos  63      Creatinine Trend: 6.08<--, 7.36<--, 5.36<--, 6.23<--, 5.44<--, 4.38<--    COAGS: PTT - ( 28 Sep 2019 09:32 )  PTT:54.5 sec          T(C): 36.7 (19 @ 08:00), Max: 36.9 (19 @ 12:00)  HR: 74 (19 @ 11:00) (67 - 117)  BP: 166/74 (19 @ 11:00) (104/66 - 170/82)  RR: 22 (19 @ 11:00) (15 - 31)  SpO2: 99% (19 @ 11:00) (94% - 100%)  Wt(kg): --    I&O's Summary    27 Sep 2019 07:01  -  28 Sep 2019 07:00  --------------------------------------------------------  IN: 1747 mL / OUT: 1100 mL / NET: 647 mL    28 Sep 2019 07:01  -  28 Sep 2019 11:59  --------------------------------------------------------  IN: 345 mL / OUT: 50 mL / NET: 295 mL        Gen: NAD  HEENT:  (-)icterus (-)pallor  CV: N S1 S2 1/6 DEJAH (+)2 Pulses B/l  Resp:  Coarse BS B/L, normal effort  GI: (+) BS Soft, NT, ND  Lymph:  (-)Edema, (-)obvious lymphadenopathy  Skin: Warm to touch, Normal turgor  Psych: Appropriate mood and affect    TELEMETRY: Afib 90-100s      < from: Cardiac Cath Lab - Adult (19 @ 12:28) >  PROCEDURE:  --  Left heart catheterization.  --  Left coronary angiography.  --  Right coronary angiography.  --  Sonosite - Diagnostic.  VENTRICLES: No LV gram was performed; however, a recent echocardiogram  demonstrated normal global and regional LV function.  CORONARY VESSELS: The coronary circulation is right dominant.  LM:   --  Ostial LM: There was a discrete 90 % stenosis.  LAD:   --  Mid LAD: Angiography showed minor luminal irregularities with no  flow limiting lesions. The stent in the mid LAD is patent.  --  Distal LAD: The vessel was small sized. Angiography showed moderate  atherosclerosis.  CX:   --  Mid circumflex: There was a tubular 50 % stenosis at the site of  a prior stent.  RI:   --  Proximal ramus intermedius: There was a diffuse 50 % stenosis at  the site of a prior stent.  RCA:   --  Mid RCA: There was a tubular 50 % stenosis at the site of a  prior stent.  --  RPLS: Angiography showed minor luminal irregularities with no flow  limiting lesions.  COMPLICATIONS: There were no complications.  DIAGNOSTIC RECOMMENDATIONS: Consultation with a cardiac surgeon be obtained  for surgical opinion and coronary artery bypass grafting.  Prepared and signed by  Jessy Méndez M.D.  Signed 2019 13:42:23  HEMODYNAMIC TABLES  Pressures:  NO PHASE  Pressures:  - HR: 69  Pressures:  - Rhythm:  Pressures:  -- Aortic Pressure (S/D/M): 170/70/113  Pressures:  -- Left Ventricle (s/edp): 164/28/--  Outputs:  NO PHASE  Outputs:  -- CALCULATIONS[age]  Outputs:  -- CALCULATIONS: Body Surface Area: 1.92  Outputs:  -- CALCULATIONS: Height in cm: 168.00  Outputs:  -- CALCULATIONS: Sex: Male  Outputs:  -- CALCULATIONS: Weight in k.60  Outputs:  -- OUTPUTS: O2 consumption: 239.38  Outputs:  -- OUTPUTS: Vo2 Indexed: 125.00  < end of copied text >    ASSESSMENT/PLAN: 70 y/o male with PMHx of CAD, HTN, HLD, T2DM, and ESRD on HD presented after abnormal stress test to [facility] for elective cardiac catheterization. Patient admitted post cath after cath revealed severe ostial LM disease. Now transferred to [facility] for CABG. S/p C3L on . Patient developed new onset Afib on .  	   - 4-6 weeks of coumadin and Amio  - Continue ASA, Statin  - On Amio load for Afib  - Monitor BP  - Pain control  - HD per renal, appears fluid overloaded today plan for HD today  - Supportive care per CTU appreciated  - EP consult appreciated      Murphy Colin MD, FACC  BEEPER [phone]

## 2019-09-28 NOTE — PROGRESS NOTE ADULT - ASSESSMENT
Assessment  DMT2: 69y Male with DM T2 with hyperglycemia, was on oral meds at home, now on insulin, Fs trending down, insulin dose decreased today, no hypoglycemic  episode, eating full meals, appears comfortable.  CAD: Postop CABG 9/23, on medications, no chest pain, stable, monitored.  HTN: Controlled, on antihypertensive medications.  Hyperthyroidism: On low dose Methimazole. New TFTs WNL: TSH 1.48, Free T4 1.3.  ESRD: Monitor labs/BMP            Yg Mackey MD  Cell: [phone]  Office: [phone]

## 2019-09-28 NOTE — PROGRESS NOTE ADULT - PROBLEM SELECTOR PLAN 1
Pt seen and examined on HD via avf- good bfr- bp stable- uf 1kg as tolerated  will plan for routine HD  TTS schedule  trend bmp

## 2019-09-28 NOTE — PROGRESS NOTE ADULT - ASSESSMENT
69 y.o. male with pmh of ESRD( HD on tu/Th/sa) at [facility] via LUE AVF. Last dialyzed yesterday 9/14, was sent to [facility] for cath after presenting with SOB and a positive stress tests/p CABG 9/23/19 with new onset afib ;Renal following for ESRD Mx.    labs, chart reviewed

## 2019-09-28 NOTE — PROGRESS NOTE ADULT - ASSESSMENT
69 yr old male   sp   C3l   H  ESRD   CoRS stents,   hyperthyroid,  DM   CVA, MI  preop   endo cslt   Hyperthyroid and hyperglycemia  post op afib   - amio load  9/28   coumadin dosing ,  afib  110   bp stable,  confused-chair alarm   dc percocet 69 yr old male   sp   C3l   H  ESRD   CoRS stents,   hyperthyroid,  DM   CVA, MI  preop   endo cslt   Hyperthyroid and hyperglycemia  post op afib   - amio load  9/28   coumadin dosing ,  afib  110   bp stable   amio load  and [id number],  confused-chair alarm   dc percocet

## 2019-09-28 NOTE — PROGRESS NOTE ADULT - SUBJECTIVE AND OBJECTIVE BOX
Chief complaint  Patient is a 69y old  Male who presents with a chief complaint of sp    C3l (28 Sep 2019 17:32)   Review of systems  Patient in bed, looks comfortable, no fever, no hypoglycemia.    Labs and Fingersticks  CAPILLARY BLOOD GLUCOSE      POCT Blood Glucose.: 155 mg/dL (28 Sep 2019 17:24)  POCT Blood Glucose.: 106 mg/dL (28 Sep 2019 12:37)  POCT Blood Glucose.: 91 mg/dL (28 Sep 2019 07:34)  POCT Blood Glucose.: 98 mg/dL (28 Sep 2019 02:43)  POCT Blood Glucose.: 96 mg/dL (28 Sep 2019 02:42)  POCT Blood Glucose.: 77 mg/dL (27 Sep 2019 21:33)      Anion Gap, Serum: 16 (09-28 @ 02:26)  Anion Gap, Serum: 15 (09-27 @ 00:54)      Calcium, Total Serum: 8.6 (09-28 @ 02:26)  Calcium, Total Serum: 8.7 (09-27 @ 00:54)  Albumin, Serum: 3.0 <L> (09-28 @ 02:26)  Albumin, Serum: 3.4 (09-27 @ 00:54)    Alanine Aminotransferase (ALT/SGPT): 21 (09-28 @ 02:26)  Alanine Aminotransferase (ALT/SGPT): 23 (09-27 @ 00:54)  Alkaline Phosphatase, Serum: 63 (09-28 @ 02:26)  Alkaline Phosphatase, Serum: 55 (09-27 @ 00:54)  Aspartate Aminotransferase (AST/SGOT): 16 (09-28 @ 02:26)  Aspartate Aminotransferase (AST/SGOT): 12 (09-27 @ 00:54)        09-28    132<L>  |  91<L>  |  46<H>  ----------------------------<  103<H>  4.2   |  25  |  6.08<H>    Ca    8.6      28 Sep 2019 02:26  Phos  5.1     09-28  Mg     2.2     09-28    TPro  5.9<L>  /  Alb  3.0<L>  /  TBili  0.3  /  DBili  x   /  AST  16  /  ALT  21  /  AlkPhos  63  09-28                        8.9    13.9  )-----------( 163      ( 28 Sep 2019 02:26 )             26.5     Medications  MEDICATIONS  (STANDING):  amiodarone    Tablet 400 milliGRAM(s) Oral every 8 hours  amiodarone    Tablet   Oral   aspirin enteric coated 81 milliGRAM(s) Oral daily  atorvastatin 40 milliGRAM(s) Oral at bedtime  dextrose 5%. 1000 milliLiter(s) (50 mL/Hr) IV Continuous <Continuous>  dextrose 50% Injectable 12.5 Gram(s) IV Push once  dextrose 50% Injectable 25 Gram(s) IV Push once  dextrose 50% Injectable 25 Gram(s) IV Push once  docusate sodium 100 milliGRAM(s) Oral three times a day  famotidine    Tablet 20 milliGRAM(s) Oral at bedtime  heparin  Infusion 700 Unit(s)/Hr (11 mL/Hr) IV Continuous <Continuous>  insulin glargine Injectable (LANTUS) 10 Unit(s) SubCutaneous at bedtime  insulin lispro (HumaLOG) corrective regimen sliding scale   SubCutaneous three times a day before meals  insulin lispro (HumaLOG) corrective regimen sliding scale   SubCutaneous at bedtime  insulin lispro Injectable (HumaLOG) 3 Unit(s) SubCutaneous three times a day before meals  ketotifen 0.025% Ophthalmic Solution 1 Drop(s) Both EYES two times a day  methimazole 5 milliGRAM(s) Oral daily  metoprolol tartrate 25 milliGRAM(s) Oral every 8 hours  sevelamer carbonate 800 milliGRAM(s) Oral three times a day with meals  sodium chloride 0.9% lock flush 3 milliLiter(s) IV Push every 8 hours  tiotropium 18 MICROgram(s) Capsule 1 Capsule(s) Inhalation daily  warfarin 5 milliGRAM(s) Oral once      Physical Exam  General: Patient comfortable in bed  Vital Signs Last 12 Hrs  T(F): 98.3 (09-28-19 @ 16:51), Max: 98.3 (09-28-19 @ 16:51)  HR: 100 (09-28-19 @ 16:51) (68 - 104)  BP: 107/61 (09-28-19 @ 16:51) (107/61 - 176/77)  BP(mean): 77 (09-28-19 @ 16:51) (77 - 111)  RR: 21 (09-28-19 @ 16:51) (16 - 31)  SpO2: 96% (09-28-19 @ 16:51) (95% - 100%)  Neck: No palpable thyroid nodules.  CVS: S1S2, No murmurs  Respiratory: No wheezing, no crepitations  GI: Abdomen soft, bowel sounds positive  Musculoskeletal:  edema lower extremities.   Skin: No skin rashes, no ecchymosis    Diagnostics    Thyroid Stimulating Hormone, Serum: AM Sched. Collection: 23-Sep-2019 06:00 (09-22 @ 17:06)  Free Thyroxine, Serum: AM Sched. Collection: 23-Sep-2019 06:00 (09-22 @ 17:06)

## 2019-09-28 NOTE — PROGRESS NOTE ADULT - SUBJECTIVE AND OBJECTIVE BOX
Subjective:  pt seen and examined, no complaints, ROS - .          ALBUTerol/ipratropium for Nebulization 3 milliLiter(s) Nebulizer every 6 hours PRN  amiodarone    Tablet 400 milliGRAM(s) Oral every 8 hours  amiodarone    Tablet   Oral   aspirin enteric coated 81 milliGRAM(s) Oral daily  atorvastatin 40 milliGRAM(s) Oral at bedtime  chlorhexidine 2% Cloths 1 Application(s) Topical <User Schedule>  dextrose 40% Gel 15 Gram(s) Oral once PRN  dextrose 5%. 1000 milliLiter(s) IV Continuous <Continuous>  dextrose 50% Injectable 12.5 Gram(s) IV Push once  dextrose 50% Injectable 25 Gram(s) IV Push once  dextrose 50% Injectable 25 Gram(s) IV Push once  docusate sodium 100 milliGRAM(s) Oral three times a day  famotidine    Tablet 20 milliGRAM(s) Oral at bedtime  glucagon  Injectable 1 milliGRAM(s) IntraMuscular once PRN  heparin  Infusion 700 Unit(s)/Hr IV Continuous <Continuous>  insulin glargine Injectable (LANTUS) 10 Unit(s) SubCutaneous at bedtime  insulin lispro (HumaLOG) corrective regimen sliding scale   SubCutaneous three times a day before meals  insulin lispro (HumaLOG) corrective regimen sliding scale   SubCutaneous at bedtime  insulin lispro Injectable (HumaLOG) 7 Unit(s) SubCutaneous three times a day before meals  ketotifen 0.025% Ophthalmic Solution 1 Drop(s) Both EYES two times a day  methimazole 5 milliGRAM(s) Oral daily  metoprolol tartrate 12.5 milliGRAM(s) Oral two times a day  oxyCODONE    5 mG/acetaminophen 325 mG 1 Tablet(s) Oral every 6 hours PRN  oxyCODONE    5 mG/acetaminophen 325 mG 2 Tablet(s) Oral every 6 hours PRN  sevelamer carbonate 800 milliGRAM(s) Oral three times a day with meals  sodium chloride 0.9% lock flush 3 milliLiter(s) IV Push every 8 hours  sodium chloride 0.9%. 1000 milliLiter(s) IV Continuous <Continuous>  tiotropium 18 MICROgram(s) Capsule 1 Capsule(s) Inhalation daily                            8.9    13.9  )-----------( 163      ( 28 Sep 2019 02:26 )             26.5       Hemoglobin: 8.9 g/dL (09-28 @ 02:26)  Hemoglobin: 9.2 g/dL (09-27 @ 00:54)  Hemoglobin: 9.1 g/dL (09-26 @ 00:35)  Hemoglobin: 10.1 g/dL (09-25 @ 02:14)  Hemoglobin: 11.0 g/dL (09-24 @ 00:48)      09-28    132<L>  |  91<L>  |  46<H>  ----------------------------<  103<H>  4.2   |  25  |  6.08<H>    Ca    8.6      28 Sep 2019 02:26  Phos  5.1     09-28  Mg     2.2     09-28    TPro  5.9<L>  /  Alb  3.0<L>  /  TBili  0.3  /  DBili  x   /  AST  16  /  ALT  21  /  AlkPhos  63  09-28    Creatinine Trend: 6.08<--, 7.36<--, 5.36<--, 6.23<--, 5.44<--, 4.38<--    COAGS: PTT - ( 28 Sep 2019 02:26 )  PTT:36.0 sec          T(C): 36.7 (09-28-19 @ 04:00), Max: 36.9 (09-27-19 @ 11:05)  HR: 68 (09-28-19 @ 07:00) (67 - 117)  BP: 141/66 (09-28-19 @ 07:00) (104/66 - 170/82)  RR: 16 (09-28-19 @ 07:00) (13 - 26)  SpO2: 98% (09-28-19 @ 07:00) (94% - 100%)  Wt(kg): --    I&O's Summary    27 Sep 2019 07:01  -  28 Sep 2019 07:00  --------------------------------------------------------  IN: 1747 mL / OUT: 1100 mL / NET: 647 mL      Appearance: Normal	  HEENT:   Normal oral mucosa, PERRL, EOMI	  Lymphatic: No lymphadenopathy , no edema  Cardiovascular: Normal S1 S2, No JVD, No murmurs , Peripheral pulses palpable 2+ bilaterally  Respiratory: Lungs clear to auscultation, normal effort 	  Gastrointestinal:  Soft, Non-tender, + BS	  Skin: No rashes, No ecchymoses, No cyanosis, warm to touch  Musculoskeletal: Normal range of motion, normal strength  Psychiatry:  Mood & affect appropriate    TELEMETRY:  afib        ASSESSMENT/PLAN: 	69y Male PMH ESRD on HD and CAD s/p multiple PCIs from 5703-8223. He now is s/p a CABG, and had post-op AF. Review of all EKGs demonstrates no evidence of pre-existing AF. His LVEF remains normal. He denies palpitation, syncope, nor angina.    - A/C with heparin to coumadin bridge   - Tele with PAF   - ASA , statin   - Amio load / lopressor   - HD per renal    - D/W Dr Archuleta

## 2019-09-28 NOTE — PROGRESS NOTE ADULT - SUBJECTIVE AND OBJECTIVE BOX
VITAL SIGNS    Telemetry:  afib  110    Vital Signs Last 24 Hrs  T(C): 36.8 (19 @ 16:51), Max: 36.9 (19 @ 00:00)  T(F): 98.3 (19 @ 16:51), Max: 98.4 (19 @ 00:00)  HR: 100 (19 @ 16:51) (67 - 104)  BP: 107/61 (19 @ 16:51) (107/61 - 176/77)  RR: 21 (19 @ 16:51) (15 - 31)  SpO2: 96% (19 @ 16:51) (94% - 100%)                   Daily     Daily Weight in k (28 Sep 2019 15:10)      Bilirubin Total, Serum: 0.3 mg/dL ( @ 02:26)    CAPILLARY BLOOD GLUCOSE      POCT Blood Glucose.: 155 mg/dL (28 Sep 2019 17:24)  POCT Blood Glucose.: 106 mg/dL (28 Sep 2019 12:37)  POCT Blood Glucose.: 91 mg/dL (28 Sep 2019 07:34)  POCT Blood Glucose.: 98 mg/dL (28 Sep 2019 02:43)  POCT Blood Glucose.: 96 mg/dL (28 Sep 2019 02:42)  POCT Blood Glucose.: 77 mg/dL (27 Sep 2019 21:33)  POCT Blood Glucose.: 118 mg/dL (27 Sep 2019 17:40)          Drains:       Pacing Wires          Settings:                                  Coumadin    YES                            PHYSICAL EXAM    Neurology: alert and oriented x 3, moves all extremities with no defecits  CV :  IRRR  Sternal Wound :  CDI , Stable  with pw  Lungs: diminished bl  bases  Abdomen: soft, nontender, nondistended, positive bowel sounds  Extremities:     plus one pedal edema VITAL SIGNS    Telemetry:  afib  110    Vital Signs Last 24 Hrs  T(C): 36.8 (19 @ 16:51), Max: 36.9 (19 @ 00:00)  T(F): 98.3 (19 @ 16:51), Max: 98.4 (19 @ 00:00)  HR: 100 (19 @ 16:51) (67 - 104)  BP: 107/61 (19 @ 16:51) (107/61 - 176/77)  RR: 21 (19 @ 16:51) (15 - 31)  SpO2: 96% (19 @ 16:51) (94% - 100%)                   Daily     Daily Weight in k (28 Sep 2019 15:10)      Bilirubin Total, Serum: 0.3 mg/dL ( @ 02:26)    CAPILLARY BLOOD GLUCOSE      POCT Blood Glucose.: 155 mg/dL (28 Sep 2019 17:24)  POCT Blood Glucose.: 106 mg/dL (28 Sep 2019 12:37)  POCT Blood Glucose.: 91 mg/dL (28 Sep 2019 07:34)  POCT Blood Glucose.: 98 mg/dL (28 Sep 2019 02:43)  POCT Blood Glucose.: 96 mg/dL (28 Sep 2019 02:42)  POCT Blood Glucose.: 77 mg/dL (27 Sep 2019 21:33)  POCT Blood Glucose.: 118 mg/dL (27 Sep 2019 17:40)          Drains:       Pacing Wires          Settings:                                  Coumadin    YES                            PHYSICAL EXAM    Neurology: alert and oriented x 1-2 , moves all extremities with no defecits  CV :  IRRR  Sternal Wound :  CDI , Stable  with pw  Lungs: diminished bl  bases  Abdomen: soft, nontender, nondistended, positive bowel sounds  Extremities:     plus one pedal edema

## 2019-09-28 NOTE — PROGRESS NOTE ADULT - ATTENDING COMMENTS
Agree with above assessment and plan as outlined above.    - Post op afib  - EP eval Dr. Archuleta called    Murphy Colin MD, [facility]  BEEPER [phone]

## 2019-09-28 NOTE — PROGRESS NOTE ADULT - SUBJECTIVE AND OBJECTIVE BOX
[facility] - NII Knight / NII Mendiola / JASWANT Arroyo/ NII Mackey/ NII Macias/ BRIGITTE Esposito / ARAMIS Calle / LUDA Bowen  ---------------------------------------------------------------------------------------------------------------  seen and examined today for ESRD on HD  Interval : NAD  VITALS:  T(F): 98.2 (09-28-19 @ 12:05), Max: 98.4 (09-28-19 @ 00:00)  HR: 69 (09-28-19 @ 13:00)  BP: 156/76 (09-28-19 @ 13:00)  RR: 25 (09-28-19 @ 13:00)  SpO2: 95% (09-28-19 @ 13:00)  Wt(kg): --    09-27 @ 07:01  -  09-28 @ 07:00  --------------------------------------------------------  IN: 1747 mL / OUT: 1100 mL / NET: 647 mL    09-28 @ 07:01  -  09-28 @ 13:11  --------------------------------------------------------  IN: 366 mL / OUT: 50 mL / NET: 316 mL      Physical Exam :-  Constitutional: NAD  Neck: Supple.  Respiratory: Bilateral equal breath sounds,  Cardiovascular: S1, S2 normal, sternotomy wound clean  Gastrointestinal: Bowel Sounds present, soft, non tender.  Extremities: No edema  Neurological: Alert and Oriented x 3, no focal deficits  Psychiatric: Normal mood, normal affect  Data:-  Allergies :   lisinopril (Other)    Hospital Medications:   MEDICATIONS  (STANDING):  amiodarone    Tablet 400 milliGRAM(s) Oral every 8 hours  amiodarone    Tablet   Oral   aspirin enteric coated 81 milliGRAM(s) Oral daily  atorvastatin 40 milliGRAM(s) Oral at bedtime  chlorhexidine 2% Cloths 1 Application(s) Topical <User Schedule>  dextrose 5%. 1000 milliLiter(s) (50 mL/Hr) IV Continuous <Continuous>  dextrose 50% Injectable 12.5 Gram(s) IV Push once  dextrose 50% Injectable 25 Gram(s) IV Push once  dextrose 50% Injectable 25 Gram(s) IV Push once  docusate sodium 100 milliGRAM(s) Oral three times a day  famotidine    Tablet 20 milliGRAM(s) Oral at bedtime  heparin  Infusion 700 Unit(s)/Hr (11 mL/Hr) IV Continuous <Continuous>  insulin glargine Injectable (LANTUS) 10 Unit(s) SubCutaneous at bedtime  insulin lispro (HumaLOG) corrective regimen sliding scale   SubCutaneous three times a day before meals  insulin lispro (HumaLOG) corrective regimen sliding scale   SubCutaneous at bedtime  insulin lispro Injectable (HumaLOG) 7 Unit(s) SubCutaneous three times a day before meals  ketotifen 0.025% Ophthalmic Solution 1 Drop(s) Both EYES two times a day  methimazole 5 milliGRAM(s) Oral daily  metoprolol tartrate 25 milliGRAM(s) Oral every 8 hours  metoprolol tartrate Injectable 5 milliGRAM(s) IV Push once  sevelamer carbonate 800 milliGRAM(s) Oral three times a day with meals  sodium chloride 0.9% lock flush 3 milliLiter(s) IV Push every 8 hours  sodium chloride 0.9%. 1000 milliLiter(s) (10 mL/Hr) IV Continuous <Continuous>  tiotropium 18 MICROgram(s) Capsule 1 Capsule(s) Inhalation daily    09-28    132<L>  |  91<L>  |  46<H>  ----------------------------<  103<H>  4.2   |  25  |  6.08<H>    Ca    8.6      28 Sep 2019 02:26  Phos  5.1     09-28  Mg     2.2     09-28    TPro  5.9<L>  /  Alb  3.0<L>  /  TBili  0.3  /  DBili      /  AST  16  /  ALT  21  /  AlkPhos  63  09-28    Creatinine Trend: 6.08 <--, 7.36 <--, 5.36 <--, 6.23 <--, 5.44 <--, 4.38 <--, 5.34 <--                        8.9    13.9  )-----------( 163      ( 28 Sep 2019 02:26 )             26.5

## 2019-09-28 NOTE — PROGRESS NOTE ADULT - SUBJECTIVE AND OBJECTIVE BOX
CRITICAL CARE ATTENDING - CTICU    MEDICATIONS  (STANDING):  amiodarone    Tablet 400 milliGRAM(s) Oral every 8 hours  amiodarone    Tablet   Oral   aspirin enteric coated 81 milliGRAM(s) Oral daily  atorvastatin 40 milliGRAM(s) Oral at bedtime  chlorhexidine 2% Cloths 1 Application(s) Topical <User Schedule>  dextrose 5%. 1000 milliLiter(s) (50 mL/Hr) IV Continuous <Continuous>  dextrose 50% Injectable 12.5 Gram(s) IV Push once  dextrose 50% Injectable 25 Gram(s) IV Push once  dextrose 50% Injectable 25 Gram(s) IV Push once  docusate sodium 100 milliGRAM(s) Oral three times a day  famotidine    Tablet 20 milliGRAM(s) Oral at bedtime  heparin  Infusion 700 Unit(s)/Hr (11 mL/Hr) IV Continuous <Continuous>  insulin glargine Injectable (LANTUS) 10 Unit(s) SubCutaneous at bedtime  insulin lispro (HumaLOG) corrective regimen sliding scale   SubCutaneous three times a day before meals  insulin lispro (HumaLOG) corrective regimen sliding scale   SubCutaneous at bedtime  insulin lispro Injectable (HumaLOG) 7 Unit(s) SubCutaneous three times a day before meals  ketotifen 0.025% Ophthalmic Solution 1 Drop(s) Both EYES two times a day  methimazole 5 milliGRAM(s) Oral daily  metoprolol tartrate 12.5 milliGRAM(s) Oral two times a day  sevelamer carbonate 800 milliGRAM(s) Oral three times a day with meals  sodium chloride 0.9% lock flush 3 milliLiter(s) IV Push every 8 hours  sodium chloride 0.9%. 1000 milliLiter(s) (10 mL/Hr) IV Continuous <Continuous>  tiotropium 18 MICROgram(s) Capsule 1 Capsule(s) Inhalation daily                                    8.9    13.9  )-----------( 163      ( 28 Sep 2019 02:26 )             26.5           132<L>  |  91<L>  |  46<H>  ----------------------------<  103<H>  4.2   |  25  |  6.08<H>    Ca    8.6      28 Sep 2019 02:26  Phos  5.1     09-28  Mg     2.2         TPro  5.9<L>  /  Alb  3.0<L>  /  TBili  0.3  /  DBili  x   /  AST  16  /  ALT  21  /  AlkPhos  63        PT/INR - ( 27 Sep 2019 00:54 )   PT: 13.7 sec;   INR: 1.20 ratio         PTT - ( 28 Sep 2019 02:26 )  PTT:36.0 sec        Daily     Daily Weight in k.8 (28 Sep 2019 02:00)       @ 07: @ 07:00  --------------------------------------------------------  IN: 1040 mL / OUT: 100 mL / NET: 940 mL     @ 07:01   @ 06:15  --------------------------------------------------------  IN: 1143 mL / OUT: 1100 mL / NET: 43 mL        Critically Ill patient  : [ ] preoperative ,   [ x] post operative    Requires :  [x ] Arterial Line   [x ] Central Line  [ ] PA catheter  [ ] IABP  [ ] ECMO  [ ] LVAD  [ ] Ventilator  [ x] pacemaker [ ] Impella.                      [x] ABG's     [ x] Pulse Oxymetry Monitoring  Bedside evaluation , monitoring , treatment of hemodynamics , fluids , IVP/ IVCD meds.        Diagnosis:     POD 5 - CABG x 3 L    Chronic Renal Failure - End Stage Renal Disease     Obesity OHS / AMY     fluid overload     hypotension    Hemodynamic lability, instability. Requires IVCD [ ] vasopressors [ ] inotropes  [ ] vasodilator  [ x]IVSS fluid  to maintain MAP, perfusion, C.I.     [x ] Hemodialysis [ ] Hemofiltration:    [ x] negative fluid balance [ ] even fluid balance [ ] positive fluid balance, in a hemodynamically unstable patient.     Thrombocytopenia     A Fib / Tachycardia     Hyperthyroid     Old CVA     DM     Hypotension a/w Hypertension, requiring intravenous medication.                   Discussed with CT surgeon, Physician's Assistant - Nurse Practitioner- Critical care medicine team.   Dicussed at  AM / PM rounds.   Chart, labs , films reviewed.    Total Time: CRITICAL CARE ATTENDING - CTICU    MEDICATIONS  (STANDING):  amiodarone    Tablet 400 milliGRAM(s) Oral every 8 hours  amiodarone    Tablet   Oral   aspirin enteric coated 81 milliGRAM(s) Oral daily  atorvastatin 40 milliGRAM(s) Oral at bedtime  chlorhexidine 2% Cloths 1 Application(s) Topical <User Schedule>  dextrose 5%. 1000 milliLiter(s) (50 mL/Hr) IV Continuous <Continuous>  dextrose 50% Injectable 12.5 Gram(s) IV Push once  dextrose 50% Injectable 25 Gram(s) IV Push once  dextrose 50% Injectable 25 Gram(s) IV Push once  docusate sodium 100 milliGRAM(s) Oral three times a day  famotidine    Tablet 20 milliGRAM(s) Oral at bedtime  heparin  Infusion 700 Unit(s)/Hr (11 mL/Hr) IV Continuous <Continuous>  insulin glargine Injectable (LANTUS) 10 Unit(s) SubCutaneous at bedtime  insulin lispro (HumaLOG) corrective regimen sliding scale   SubCutaneous three times a day before meals  insulin lispro (HumaLOG) corrective regimen sliding scale   SubCutaneous at bedtime  insulin lispro Injectable (HumaLOG) 7 Unit(s) SubCutaneous three times a day before meals  ketotifen 0.025% Ophthalmic Solution 1 Drop(s) Both EYES two times a day  methimazole 5 milliGRAM(s) Oral daily  metoprolol tartrate 12.5 milliGRAM(s) Oral two times a day  sevelamer carbonate 800 milliGRAM(s) Oral three times a day with meals  sodium chloride 0.9% lock flush 3 milliLiter(s) IV Push every 8 hours  sodium chloride 0.9%. 1000 milliLiter(s) (10 mL/Hr) IV Continuous <Continuous>  tiotropium 18 MICROgram(s) Capsule 1 Capsule(s) Inhalation daily                                8.9    13.9  )-----------( 163      ( 28 Sep 2019 02:26 )             26.5           132<L>  |  91<L>  |  46<H>  ----------------------------<  103<H>  4.2   |  25  |  6.08<H>    Ca    8.6      28 Sep 2019 02:26  Phos  5.1     09-28  Mg     2.2         TPro  5.9<L>  /  Alb  3.0<L>  /  TBili  0.3  /  DBili  x   /  AST  16  /  ALT  21  /  AlkPhos  63        PT/INR - ( 27 Sep 2019 00:54 )   PT: 13.7 sec;   INR: 1.20 ratio         PTT - ( 28 Sep 2019 02:26 )  PTT:36.0 sec        Daily     Daily Weight in k.8 (28 Sep 2019 02:00)       @ 07: @ 07:00  --------------------------------------------------------  IN: 1040 mL / OUT: 100 mL / NET: 940 mL     @ 07:01   @ 06:15  --------------------------------------------------------  IN: 1143 mL / OUT: 1100 mL / NET: 43 mL        Critically Ill patient  : [ ] preoperative ,   [ x] post operative    Requires :  [x ] Arterial Line   [x ] Central Line  [ ] PA catheter  [ ] IABP  [ ] ECMO  [ ] LVAD  [ ] Ventilator  [ x] pacemaker [ ] Impella.                      [x] ABG's     [ x] Pulse Oxymetry Monitoring  Bedside evaluation , monitoring , treatment of hemodynamics , fluids , IVP/ IVCD meds.        Diagnosis:     POD 5 - CABG x 3 L    Chronic Renal Failure - End Stage Renal Disease     Obesity OHS / AMY     fluid overload     hypotension    Hemodynamic lability, instability. Requires IVCD [ ] vasopressors [ ] inotropes  [ ] vasodilator  [ x]IVSS fluid  to maintain MAP, perfusion, C.I.     [x ] Hemodialysis [ ] Hemofiltration:    [ x] negative fluid balance [ ] even fluid balance [ ] positive fluid balance, in a hemodynamically unstable patient.     Thrombocytopenia     A Fib / Tachycardia     Hyperthyroid     Old CVA     DM     Hypotension a/w Hypertension, requiring intravenous medication.                   Discussed with CT surgeon, Physician's Assistant - Nurse Practitioner- Critical care medicine team.   Dicussed at  AM / PM rounds.   Chart, labs , films reviewed.    Total Time: CRITICAL CARE ATTENDING - CTICU    MEDICATIONS  (STANDING):  amiodarone    Tablet 400 milliGRAM(s) Oral every 8 hours  amiodarone    Tablet   Oral   aspirin enteric coated 81 milliGRAM(s) Oral daily  atorvastatin 40 milliGRAM(s) Oral at bedtime  chlorhexidine 2% Cloths 1 Application(s) Topical <User Schedule>  dextrose 5%. 1000 milliLiter(s) (50 mL/Hr) IV Continuous <Continuous>  dextrose 50% Injectable 12.5 Gram(s) IV Push once  dextrose 50% Injectable 25 Gram(s) IV Push once  dextrose 50% Injectable 25 Gram(s) IV Push once  docusate sodium 100 milliGRAM(s) Oral three times a day  famotidine    Tablet 20 milliGRAM(s) Oral at bedtime  heparin  Infusion 700 Unit(s)/Hr (11 mL/Hr) IV Continuous <Continuous>  insulin glargine Injectable (LANTUS) 10 Unit(s) SubCutaneous at bedtime  insulin lispro (HumaLOG) corrective regimen sliding scale   SubCutaneous three times a day before meals  insulin lispro (HumaLOG) corrective regimen sliding scale   SubCutaneous at bedtime  insulin lispro Injectable (HumaLOG) 7 Unit(s) SubCutaneous three times a day before meals  ketotifen 0.025% Ophthalmic Solution 1 Drop(s) Both EYES two times a day  methimazole 5 milliGRAM(s) Oral daily  metoprolol tartrate 12.5 milliGRAM(s) Oral two times a day  sevelamer carbonate 800 milliGRAM(s) Oral three times a day with meals  sodium chloride 0.9% lock flush 3 milliLiter(s) IV Push every 8 hours  sodium chloride 0.9%. 1000 milliLiter(s) (10 mL/Hr) IV Continuous <Continuous>  tiotropium 18 MICROgram(s) Capsule 1 Capsule(s) Inhalation daily                                8.9    13.9  )-----------( 163      ( 28 Sep 2019 02:26 )             26.5           132<L>  |  91<L>  |  46<H>  ----------------------------<  103<H>  4.2   |  25  |  6.08<H>    Ca    8.6      28 Sep 2019 02:26  Phos  5.1     09-28  Mg     2.2         TPro  5.9<L>  /  Alb  3.0<L>  /  TBili  0.3  /  DBili  x   /  AST  16  /  ALT  21  /  AlkPhos  63        PT/INR - ( 27 Sep 2019 00:54 )   PT: 13.7 sec;   INR: 1.20 ratio         PTT - ( 28 Sep 2019 02:26 )  PTT:36.0 sec        Daily     Daily Weight in k.8 (28 Sep 2019 02:00)       @ 07: @ 07:00  --------------------------------------------------------  IN: 1040 mL / OUT: 100 mL / NET: 940 mL     @ 07:01   @ 06:15  --------------------------------------------------------  IN: 1143 mL / OUT: 1100 mL / NET: 43 mL        Critically Ill patient  : [ ] preoperative ,   [ x] post operative    Requires :  [x ] Arterial Line   [x ] Central Line  [ ] PA catheter  [ ] IABP  [ ] ECMO  [ ] LVAD  [ ] Ventilator  [ x] pacemaker [ ] Impella.                      [x] ABG's     [ x] Pulse Oxymetry Monitoring  Bedside evaluation , monitoring , treatment of hemodynamics , fluids , IVP/ IVCD meds.        Diagnosis:     POD 5 - CABG x 3 L    Chronic Renal Failure - End Stage Renal Disease     Obesity OHS / AMY     fluid overload     hypotension - resolved    Hemodynamic lability, instability. Requires IVCD [ ] vasopressors [ ] inotropes  [ ] vasodilator  [ x]IVSS fluid  to maintain MAP, perfusion, C.I.     [x ] Hemodialysis [ ] Hemofiltration:    [ x] negative fluid balance [ ] even fluid balance [ ] positive fluid balance, in a potentially  hemodynamically unstable patient.     A Fib / Tachycardia     Hyperthyroid     Old CVA     DM     Hypotension a/w Hypertension, requiring intravenous medication.     Hyponatremia     Will require HD / HF today     Anticoagulated on coumadin for PA Fib.     Requires bedside physical therapy, mobilization and total care home care.     Temporary pacemaker (TPM) interrogation and setting.                   Discussed with CT surgeon, Physician's Assistant - Nurse Practitioner- Critical care medicine team.   Dicussed at  AM / PM rounds.   Chart, labs , films reviewed.    Total Time: 30 min

## 2019-09-29 LAB
ANION GAP SERPL CALC-SCNC: 14 MMOL/L — SIGNIFICANT CHANGE UP (ref 5–17)
APTT BLD: 43.4 SEC — HIGH (ref 27.5–36.3)
APTT BLD: 52.4 SEC — HIGH (ref 27.5–36.3)
BUN SERPL-MCNC: 42 MG/DL — HIGH (ref 7–23)
CALCIUM SERPL-MCNC: 8.9 MG/DL — SIGNIFICANT CHANGE UP (ref 8.4–10.5)
CHLORIDE SERPL-SCNC: 91 MMOL/L — LOW (ref 96–108)
CO2 SERPL-SCNC: 27 MMOL/L — SIGNIFICANT CHANGE UP (ref 22–31)
CREAT SERPL-MCNC: 6.09 MG/DL — HIGH (ref 0.5–1.3)
GLUCOSE BLDC GLUCOMTR-MCNC: 136 MG/DL — HIGH (ref 70–99)
GLUCOSE BLDC GLUCOMTR-MCNC: 167 MG/DL — HIGH (ref 70–99)
GLUCOSE BLDC GLUCOMTR-MCNC: 173 MG/DL — HIGH (ref 70–99)
GLUCOSE BLDC GLUCOMTR-MCNC: 205 MG/DL — HIGH (ref 70–99)
GLUCOSE SERPL-MCNC: 145 MG/DL — HIGH (ref 70–99)
HCT VFR BLD CALC: 28.9 % — LOW (ref 39–50)
HGB BLD-MCNC: 9 G/DL — LOW (ref 13–17)
INR BLD: 1.53 RATIO — HIGH (ref 0.88–1.16)
MCHC RBC-ENTMCNC: 29 PG — SIGNIFICANT CHANGE UP (ref 27–34)
MCHC RBC-ENTMCNC: 31.3 GM/DL — LOW (ref 32–36)
MCV RBC AUTO: 92.8 FL — SIGNIFICANT CHANGE UP (ref 80–100)
PLATELET # BLD AUTO: 229 K/UL — SIGNIFICANT CHANGE UP (ref 150–400)
POTASSIUM SERPL-MCNC: 4.4 MMOL/L — SIGNIFICANT CHANGE UP (ref 3.5–5.3)
POTASSIUM SERPL-SCNC: 4.4 MMOL/L — SIGNIFICANT CHANGE UP (ref 3.5–5.3)
PROTHROM AB SERPL-ACNC: 17.7 SEC — HIGH (ref 10–12.9)
RBC # BLD: 3.11 M/UL — LOW (ref 4.2–5.8)
RBC # FLD: 12.8 % — SIGNIFICANT CHANGE UP (ref 10.3–14.5)
SODIUM SERPL-SCNC: 132 MMOL/L — LOW (ref 135–145)
WBC # BLD: 14.2 K/UL — HIGH (ref 3.8–10.5)
WBC # FLD AUTO: 14.2 K/UL — HIGH (ref 3.8–10.5)

## 2019-09-29 PROCEDURE — 93010 ELECTROCARDIOGRAM REPORT: CPT

## 2019-09-29 PROCEDURE — 71045 X-RAY EXAM CHEST 1 VIEW: CPT | Mod: 26

## 2019-09-29 RX ORDER — INSULIN LISPRO 100/ML
6 VIAL (ML) SUBCUTANEOUS
Refills: 0 | Status: DISCONTINUED | OUTPATIENT
Start: 2019-09-29 | End: 2019-09-30

## 2019-09-29 RX ORDER — HALOPERIDOL DECANOATE 100 MG/ML
2 INJECTION INTRAMUSCULAR ONCE
Refills: 0 | Status: COMPLETED | OUTPATIENT
Start: 2019-09-29 | End: 2019-09-29

## 2019-09-29 RX ORDER — WARFARIN SODIUM 2.5 MG/1
5 TABLET ORAL ONCE
Refills: 0 | Status: COMPLETED | OUTPATIENT
Start: 2019-09-29 | End: 2019-09-29

## 2019-09-29 RX ORDER — INSULIN GLARGINE 100 [IU]/ML
14 INJECTION, SOLUTION SUBCUTANEOUS AT BEDTIME
Refills: 0 | Status: DISCONTINUED | OUTPATIENT
Start: 2019-09-29 | End: 2019-10-06

## 2019-09-29 RX ADMIN — FAMOTIDINE 20 MILLIGRAM(S): 10 INJECTION INTRAVENOUS at 21:40

## 2019-09-29 RX ADMIN — SODIUM CHLORIDE 3 MILLILITER(S): 9 INJECTION INTRAMUSCULAR; INTRAVENOUS; SUBCUTANEOUS at 18:16

## 2019-09-29 RX ADMIN — Medication 1: at 18:13

## 2019-09-29 RX ADMIN — Medication 81 MILLIGRAM(S): at 13:20

## 2019-09-29 RX ADMIN — WARFARIN SODIUM 5 MILLIGRAM(S): 2.5 TABLET ORAL at 21:40

## 2019-09-29 RX ADMIN — Medication 25 MILLIGRAM(S): at 21:40

## 2019-09-29 RX ADMIN — SEVELAMER CARBONATE 800 MILLIGRAM(S): 2400 POWDER, FOR SUSPENSION ORAL at 18:13

## 2019-09-29 RX ADMIN — SODIUM CHLORIDE 3 MILLILITER(S): 9 INJECTION INTRAMUSCULAR; INTRAVENOUS; SUBCUTANEOUS at 22:00

## 2019-09-29 RX ADMIN — AMIODARONE HYDROCHLORIDE 400 MILLIGRAM(S): 400 TABLET ORAL at 06:38

## 2019-09-29 RX ADMIN — KETOTIFEN FUMARATE 1 DROP(S): 0.34 SOLUTION OPHTHALMIC at 18:13

## 2019-09-29 RX ADMIN — Medication 3 UNIT(S): at 13:19

## 2019-09-29 RX ADMIN — Medication 100 MILLIGRAM(S): at 06:39

## 2019-09-29 RX ADMIN — SODIUM CHLORIDE 3 MILLILITER(S): 9 INJECTION INTRAMUSCULAR; INTRAVENOUS; SUBCUTANEOUS at 06:39

## 2019-09-29 RX ADMIN — INSULIN GLARGINE 14 UNIT(S): 100 INJECTION, SOLUTION SUBCUTANEOUS at 21:39

## 2019-09-29 RX ADMIN — ATORVASTATIN CALCIUM 40 MILLIGRAM(S): 80 TABLET, FILM COATED ORAL at 21:40

## 2019-09-29 RX ADMIN — HEPARIN SODIUM 12 UNIT(S)/HR: 5000 INJECTION INTRAVENOUS; SUBCUTANEOUS at 18:14

## 2019-09-29 RX ADMIN — KETOTIFEN FUMARATE 1 DROP(S): 0.34 SOLUTION OPHTHALMIC at 06:38

## 2019-09-29 RX ADMIN — HALOPERIDOL DECANOATE 2 MILLIGRAM(S): 100 INJECTION INTRAMUSCULAR at 00:32

## 2019-09-29 RX ADMIN — Medication 6 UNIT(S): at 18:13

## 2019-09-29 RX ADMIN — Medication 25 MILLIGRAM(S): at 13:20

## 2019-09-29 RX ADMIN — Medication 25 MILLIGRAM(S): at 06:38

## 2019-09-29 RX ADMIN — Medication 100 MILLIGRAM(S): at 21:40

## 2019-09-29 RX ADMIN — Medication 100 MILLIGRAM(S): at 13:20

## 2019-09-29 RX ADMIN — Medication 2: at 13:19

## 2019-09-29 RX ADMIN — SEVELAMER CARBONATE 800 MILLIGRAM(S): 2400 POWDER, FOR SUSPENSION ORAL at 13:20

## 2019-09-29 NOTE — PROGRESS NOTE ADULT - SUBJECTIVE AND OBJECTIVE BOX
Patient is a 69y old  Male who presents with a chief complaint of sob (29 Sep 2019 11:33)      SUBJECTIVE / OVERNIGHT EVENTS: overnight events noted    ROS:  Resp: No cough no sputum production  CVS: No chest pain no palpitations no orthopnea  GI: no N/V/D  : no dysuria, no hematuria  Neuro: no weakness no paresthesias  Heme: No petechiae no easy bruising  Msk: No joint pain no swelling  Skin: No rash no itching        MEDICATIONS  (STANDING):  amiodarone    Tablet 200 milliGRAM(s) Oral daily  amiodarone    Tablet   Oral   aspirin enteric coated 81 milliGRAM(s) Oral daily  atorvastatin 40 milliGRAM(s) Oral at bedtime  dextrose 5%. 1000 milliLiter(s) (50 mL/Hr) IV Continuous <Continuous>  dextrose 50% Injectable 12.5 Gram(s) IV Push once  dextrose 50% Injectable 25 Gram(s) IV Push once  dextrose 50% Injectable 25 Gram(s) IV Push once  docusate sodium 100 milliGRAM(s) Oral three times a day  famotidine    Tablet 20 milliGRAM(s) Oral at bedtime  heparin  Infusion 700 Unit(s)/Hr (11 mL/Hr) IV Continuous <Continuous>  insulin glargine Injectable (LANTUS) 14 Unit(s) SubCutaneous at bedtime  insulin lispro (HumaLOG) corrective regimen sliding scale   SubCutaneous three times a day before meals  insulin lispro (HumaLOG) corrective regimen sliding scale   SubCutaneous at bedtime  insulin lispro Injectable (HumaLOG) 6 Unit(s) SubCutaneous three times a day before meals  ketotifen 0.025% Ophthalmic Solution 1 Drop(s) Both EYES two times a day  methimazole 5 milliGRAM(s) Oral daily  metoprolol tartrate 25 milliGRAM(s) Oral every 8 hours  sevelamer carbonate 800 milliGRAM(s) Oral three times a day with meals  sodium chloride 0.9% lock flush 3 milliLiter(s) IV Push every 8 hours  tiotropium 18 MICROgram(s) Capsule 1 Capsule(s) Inhalation daily  warfarin 5 milliGRAM(s) Oral once    MEDICATIONS  (PRN):  acetaminophen   Tablet .. 650 milliGRAM(s) Oral every 6 hours PRN Mild Pain (1 - 3)  ALBUTerol/ipratropium for Nebulization 3 milliLiter(s) Nebulizer every 6 hours PRN Shortness of Breath and/or Wheezing  dextrose 40% Gel 15 Gram(s) Oral once PRN Blood Glucose LESS THAN 70 milliGRAM(s)/deciLiter  glucagon  Injectable 1 milliGRAM(s) IntraMuscular once PRN Glucose <70 milliGRAM(s)/deciLiter        CAPILLARY BLOOD GLUCOSE      POCT Blood Glucose.: 205 mg/dL (29 Sep 2019 12:25)  POCT Blood Glucose.: 136 mg/dL (29 Sep 2019 08:13)  POCT Blood Glucose.: 149 mg/dL (28 Sep 2019 21:33)  POCT Blood Glucose.: 155 mg/dL (28 Sep 2019 17:24)    I&O's Summary    28 Sep 2019 07:01  -  29 Sep 2019 07:00  --------------------------------------------------------  IN: 1154 mL / OUT: 2150 mL / NET: -996 mL    29 Sep 2019 07:01  -  29 Sep 2019 15:03  --------------------------------------------------------  IN: 240 mL / OUT: 0 mL / NET: 240 mL        Vital Signs Last 24 Hrs  T(C): 36.6 (29 Sep 2019 14:00), Max: 37.4 (28 Sep 2019 19:52)  T(F): 97.9 (29 Sep 2019 14:00), Max: 99.3 (28 Sep 2019 19:52)  HR: 58 (29 Sep 2019 14:00) (58 - 100)  BP: 133/71 (29 Sep 2019 14:00) (107/61 - 164/79)  BP(mean): 83 (28 Sep 2019 19:52) (77 - 100)  RR: 18 (29 Sep 2019 14:00) (18 - 24)  SpO2: 97% (29 Sep 2019 14:00) (95% - 99%)    PHYSICAL EXAM:  GENERAL: NAD, well-developed  HEAD:  Atraumatic, Normocephalic  EYES: EOMI, PERRLA, conjunctiva and sclera clear  NECK: Supple, No JVD  CHEST/LUNG: Clear to auscultation bilaterally; decreased breath sounds at bases   sternal wound with bandage   HEART: S1S2; soft ejection systolic murmur best heard at left sternal border no rub no gallop   ABDOMEN: Soft, Nontender, Nondistended; Bowel sounds present  EXTREMITIES:  + Peripheral Pulses, No clubbing or cyanosis, 1 + edema  PSYCH: AO x 3,   NEUROLOGY: Alert, no focal motor or sensory deficits  SKIN: No rashes or lesions    LABS:                        9.0    14.2  )-----------( 229      ( 29 Sep 2019 09:54 )             28.9     09-29    132<L>  |  91<L>  |  42<H>  ----------------------------<  145<H>  4.4   |  27  |  6.09<H>    Ca    8.9      29 Sep 2019 09:54  Phos  5.1     09-28  Mg     2.2     09-28    TPro  5.9<L>  /  Alb  3.0<L>  /  TBili  0.3  /  DBili  x   /  AST  16  /  ALT  21  /  AlkPhos  63  09-28    PT/INR - ( 29 Sep 2019 09:54 )   PT: 17.7 sec;   INR: 1.53 ratio         PTT - ( 29 Sep 2019 09:54 )  PTT:43.4 sec            All consultant(s) notes reviewed and care discussed with other providers        Contact Number, Dr Cagle [phone]

## 2019-09-29 NOTE — PROGRESS NOTE ADULT - PROBLEM SELECTOR PLAN 1
Will continue current insulin regimen for now; Will continue to monitor FS, log, and FU.  Patient counseled for compliance with consistent low carb diet. Will increase insulin to Lantus 14u at bedtime, Humalog 6u before each meal, and continue coverage scale; Will continue to monitor FS, log, and FU.  Patient counseled for compliance with consistent low carb diet.

## 2019-09-29 NOTE — CHART NOTE - NSCHARTNOTEFT_GEN_A_CORE
Called to the bedside by RN and Son for combative behavior: Delirium, wants to call police and states that son is "kidnapping" him  Extensive attempts at reorientation by RN staff  Striking at staff, pulling off nasal cannula oxygen--refusing replacement, pushing IV pump   room air pulse ox: 78%  Blood pressure 156/80  Haldol 1mg IVP stat  EPM disconnected and cables secured/covered  CBC CMP ABG when able Called to the bedside by RN and Son for combative behavior: Delirium, wants to call police and states that son is "kidnapping" him and seeing people on the ceiling/in the lights  Requesting that staff call 911   Extensive attempts at re-orientation by RN staff  pulling off nasal cannula oxygen--refusing replacement and attempts at replacement result in patient striking out at staff  pushing IV pump   room air pulse ox: 78%  Blood pressure 156/80  Haldol 1mg IVP stat  EPM disconnected and cables secured/covered  CBC CMP ABG when able Called to the bedside by RN and Son for combative behavior: Delirium, wants to call police and states that son is "kidnapping" him and seeing people on the ceiling/in the lights  Requesting that staff call 911   Noted to be confused earlier on transfer, narcotics dc'd, chair alarm in place  Extensive attempts at re-orientation by RN staff  pulling off nasal cannula oxygen--refusing replacement and attempts at replacement result in patient striking out at staff  pushing IV pump   room air pulse ox: 78%  Blood pressure 156/80  Haldol 2.5 IVP stat  EPM disconnected and cables secured/covered  CBC CMP ABG when able Called to the bedside by RN and Son for combative behavior: Delirium, wants to call police and states that son is "kidnapping" him and seeing people on the ceiling/in the lights  Requesting that staff call 911   Noted to be confused earlier on transfer, narcotics dc'd, chair alarm in place  Extensive attempts at re-orientation by RN staff  pulling off nasal cannula oxygen--refusing replacement and attempts at replacement result in patient striking out at staff  pushing IV pump   room air pulse ox: 78%  Blood pressure 156/80  Haldol 2.5 IVP stat  EPM disconnected and cables secured/covered  CBC CMP ABG Fingerstick when safe for staff to approach patient Called to the bedside by RN and Son for combative behavior: Delirium, wants to call police and states that son is "kidnapping" him and seeing people on the ceiling/in the lights  Requesting that staff call 911   Noted to be confused earlier on transfer, narcotics dc'd, chair alarm in place  Son states that patient has history of post-op delirium but behavior is controlled with family at the bedside  Extensive attempts at re-orientation by RN staff  pulling off nasal cannula oxygen--refusing replacement and attempts at replacement result in patient striking out at staff  pushing IV pump   room air pulse ox: 78%  Blood pressure 156/80  Haldol 2.5 IVP stat  EPM disconnected and cables secured/covered  CBC CMP ABG Fingerstick when safe for staff to approach patient

## 2019-09-29 NOTE — PROGRESS NOTE ADULT - ASSESSMENT
Assessment  DMT2: 69y Male with DM T2 with hyperglycemia, was on oral meds at home, now on insulin, insulin dose decreased yesterday, blood sugars now trending within acceptable range, no new hypoglycemic episode, appears comfortable.  CAD: Postop CABG 9/23, on medications, no chest pain, stable, monitored.  HTN: Controlled, on antihypertensive medications.  Hyperthyroidism: On low dose Methimazole. New TFTs WNL: TSH 1.48, Free T4 1.3.  ESRD: Monitor labs/BMP            Yg Mackey MD  Cell: [phone]  Office: [phone] Assessment  DMT2: 69y Male with DM T2 with hyperglycemia, was on oral meds at home, now on insulin, insulin dose decreased yesterday, blood sugars now trending up, no new hypoglycemic episode, eating partial meals, appears comfortable.  CAD: Postop CABG 9/23, on medications, no chest pain, stable, monitored.  HTN: Controlled, on antihypertensive medications.  Hyperthyroidism: On low dose Methimazole. New TFTs WNL: TSH 1.48, Free T4 1.3.  ESRD: Monitor labs/BMP            Yg Mackey MD  Cell: [phone]  Office: [phone] Assessment  DMT2: 69y Male with DM T2 with hyperglycemia, was on oral meds at home, now on insulin,  insulin dose decreased yesterday, blood sugars now trending up, no new hypoglycemic episode, eating partial meals, appears comfortable.  CAD: Postop CABG 9/23, on medications, no chest pain, stable, monitored.  HTN: Controlled, on antihypertensive medications.  Hyperthyroidism: On low dose Methimazole. New TFTs WNL: TSH 1.48, Free T4 1.3.  ESRD: Monitor labs/BMP            Yg Mackey MD  Cell: [phone]  Office: [phone]

## 2019-09-29 NOTE — PROGRESS NOTE ADULT - SUBJECTIVE AND OBJECTIVE BOX
pt seen and examined , pt calmer this Am , agitated overnight          acetaminophen   Tablet .. 650 milliGRAM(s) Oral every 6 hours PRN  ALBUTerol/ipratropium for Nebulization 3 milliLiter(s) Nebulizer every 6 hours PRN  amiodarone    Tablet 200 milliGRAM(s) Oral daily  amiodarone    Tablet   Oral   aspirin enteric coated 81 milliGRAM(s) Oral daily  atorvastatin 40 milliGRAM(s) Oral at bedtime  dextrose 40% Gel 15 Gram(s) Oral once PRN  dextrose 5%. 1000 milliLiter(s) IV Continuous <Continuous>  dextrose 50% Injectable 12.5 Gram(s) IV Push once  dextrose 50% Injectable 25 Gram(s) IV Push once  dextrose 50% Injectable 25 Gram(s) IV Push once  docusate sodium 100 milliGRAM(s) Oral three times a day  famotidine    Tablet 20 milliGRAM(s) Oral at bedtime  glucagon  Injectable 1 milliGRAM(s) IntraMuscular once PRN  heparin  Infusion 700 Unit(s)/Hr IV Continuous <Continuous>  insulin glargine Injectable (LANTUS) 10 Unit(s) SubCutaneous at bedtime  insulin lispro (HumaLOG) corrective regimen sliding scale   SubCutaneous three times a day before meals  insulin lispro (HumaLOG) corrective regimen sliding scale   SubCutaneous at bedtime  insulin lispro Injectable (HumaLOG) 3 Unit(s) SubCutaneous three times a day before meals  ketotifen 0.025% Ophthalmic Solution 1 Drop(s) Both EYES two times a day  methimazole 5 milliGRAM(s) Oral daily  metoprolol tartrate 25 milliGRAM(s) Oral every 8 hours  sevelamer carbonate 800 milliGRAM(s) Oral three times a day with meals  sodium chloride 0.9% lock flush 3 milliLiter(s) IV Push every 8 hours  tiotropium 18 MICROgram(s) Capsule 1 Capsule(s) Inhalation daily                            8.9    13.9  )-----------( 163      ( 28 Sep 2019 02:26 )             26.5       Hemoglobin: 8.9 g/dL (09-28 @ 02:26)  Hemoglobin: 9.2 g/dL (09-27 @ 00:54)  Hemoglobin: 9.1 g/dL (09-26 @ 00:35)  Hemoglobin: 10.1 g/dL (09-25 @ 02:14)      09-28    132<L>  |  91<L>  |  46<H>  ----------------------------<  103<H>  4.2   |  25  |  6.08<H>    Ca    8.6      28 Sep 2019 02:26  Phos  5.1     09-28  Mg     2.2     09-28    TPro  5.9<L>  /  Alb  3.0<L>  /  TBili  0.3  /  DBili  x   /  AST  16  /  ALT  21  /  AlkPhos  63  09-28    Creatinine Trend: 6.08<--, 7.36<--, 5.36<--, 6.23<--, 5.44<--, 4.38<--    COAGS: PT/INR - ( 28 Sep 2019 14:28 )   PT: 14.8 sec;   INR: 1.28 ratio         PTT - ( 28 Sep 2019 14:28 )  PTT:54.8 sec          T(C): 36.9 (09-29-19 @ 06:37), Max: 37.4 (09-28-19 @ 19:52)  HR: 69 (09-29-19 @ 06:37) (68 - 104)  BP: 164/79 (09-29-19 @ 06:37) (107/61 - 176/77)  RR: 18 (09-29-19 @ 06:37) (16 - 31)  SpO2: 97% (09-29-19 @ 06:37) (95% - 100%)  Wt(kg): --    I&O's Summary    27 Sep 2019 07:01  -  28 Sep 2019 07:00  --------------------------------------------------------  IN: 1747 mL / OUT: 1100 mL / NET: 647 mL    28 Sep 2019 07:01  -  29 Sep 2019 06:58  --------------------------------------------------------  IN: 1154 mL / OUT: 2150 mL / NET: -996 mL      Appearance: Normal	  HEENT:   Normal oral mucosa, PERRL, EOMI	  Lymphatic: No lymphadenopathy , no edema  Cardiovascular: Normal S1 S2, No JVD, No murmurs , Peripheral pulses palpable 2+ bilaterally  Respiratory: Lungs clear to auscultation, normal effort 	  Gastrointestinal:  Soft, Non-tender, + BS	  Skin: No rashes, No ecchymoses, No cyanosis, warm to touch  Musculoskeletal: Normal range of motion, normal strength  Psychiatry:  Mood & affect appropriate    TELEMETRY:  afib        ASSESSMENT/PLAN: 	69y Male PMH ESRD on HD and CAD s/p multiple PCIs from 2578-7923. He now is s/p a CABG, and had post-op AF. Review of all EKGs demonstrates no evidence of pre-existing AF. His LVEF remains normal. He denies palpitation, syncope, nor angina.    - A/C with heparin to coumadin bridge   - Tele with PAF   - ASA , statin   - Amio load / lopressor - when pt is calm the HR is better controlled , cont present lopressor dose.   - HD per renal    - D/W Dr Archuleta

## 2019-09-29 NOTE — PROGRESS NOTE ADULT - ASSESSMENT
69 yr old male   sp   C3l   H  ESRD   CoRS stents,   hyperthyroid,  DM   CVA, MI  preop   endo cslt   Hyperthyroid and hyperglycemia  post op afib   - amio load  9/28   coumadin dosing ,  afib  110   bp stable   amio load  and [id number],  confused-chair alarm   dc percocet  9/29 VSS - LLL diminished - chest pt given - spirometry encouraged oob - o2 weened off - will ck o2 sat. INR 1.53 5mg Coumadin given  d/c planning home Tues?

## 2019-09-29 NOTE — PROGRESS NOTE ADULT - SUBJECTIVE AND OBJECTIVE BOX
Patient denies CP, SOB Review of systems otherwise (-)    acetaminophen   Tablet .. 650 milliGRAM(s) Oral every 6 hours PRN  ALBUTerol/ipratropium for Nebulization 3 milliLiter(s) Nebulizer every 6 hours PRN  amiodarone    Tablet 200 milliGRAM(s) Oral daily  amiodarone    Tablet   Oral   aspirin enteric coated 81 milliGRAM(s) Oral daily  atorvastatin 40 milliGRAM(s) Oral at bedtime  dextrose 40% Gel 15 Gram(s) Oral once PRN  dextrose 5%. 1000 milliLiter(s) IV Continuous <Continuous>  dextrose 50% Injectable 12.5 Gram(s) IV Push once  dextrose 50% Injectable 25 Gram(s) IV Push once  dextrose 50% Injectable 25 Gram(s) IV Push once  docusate sodium 100 milliGRAM(s) Oral three times a day  famotidine    Tablet 20 milliGRAM(s) Oral at bedtime  glucagon  Injectable 1 milliGRAM(s) IntraMuscular once PRN  heparin  Infusion 700 Unit(s)/Hr IV Continuous <Continuous>  insulin glargine Injectable (LANTUS) 10 Unit(s) SubCutaneous at bedtime  insulin lispro (HumaLOG) corrective regimen sliding scale   SubCutaneous three times a day before meals  insulin lispro (HumaLOG) corrective regimen sliding scale   SubCutaneous at bedtime  insulin lispro Injectable (HumaLOG) 3 Unit(s) SubCutaneous three times a day before meals  ketotifen 0.025% Ophthalmic Solution 1 Drop(s) Both EYES two times a day  methimazole 5 milliGRAM(s) Oral daily  metoprolol tartrate 25 milliGRAM(s) Oral every 8 hours  sevelamer carbonate 800 milliGRAM(s) Oral three times a day with meals  sodium chloride 0.9% lock flush 3 milliLiter(s) IV Push every 8 hours  tiotropium 18 MICROgram(s) Capsule 1 Capsule(s) Inhalation daily                            8.9    13.9  )-----------( 163      ( 28 Sep 2019 02:26 )             26.5       Hemoglobin: 8.9 g/dL ( @ 02:26)  Hemoglobin: 9.2 g/dL ( @ 00:54)  Hemoglobin: 9.1 g/dL ( @ 00:35)  Hemoglobin: 10.1 g/dL ( @ 02:14)          132<L>  |  91<L>  |  42<H>  ----------------------------<  145<H>  4.4   |  27  |  6.09<H>    Ca    8.9      29 Sep 2019 09:54  Phos  5.1       Mg     2.2         TPro  5.9<L>  /  Alb  3.0<L>  /  TBili  0.3  /  DBili  x   /  AST  16  /  ALT  21  /  AlkPhos  63      Creatinine Trend: 6.09<--, 6.08<--, 7.36<--, 5.36<--, 6.23<--, 5.44<--    COAGS: PT/INR - ( 29 Sep 2019 09:54 )   PT: 17.7 sec;   INR: 1.53 ratio         PTT - ( 29 Sep 2019 09:54 )  PTT:43.4 sec          T(C): 36.9 (19 @ 06:37), Max: 37.4 (19 @ 19:52)  HR: 69 (19 @ 06:37) (69 - 104)  BP: 164/79 (19 @ 06:37) (107/61 - 176/77)  RR: 18 (19 @ 06:37) (18 - 26)  SpO2: 97% (19 @ 06:37) (95% - 100%)  Wt(kg): --    I&O's Summary    28 Sep 2019 07:01  -  29 Sep 2019 07:00  --------------------------------------------------------  IN: 1154 mL / OUT: 2150 mL / NET: -996 mL      Gen: NAD  HEENT:  (-)icterus (-)pallor  CV: N S1 S2  DEJAH (+)2 Pulses B/l  Resp:  Coarse BS B/L, normal effort  GI: (+) BS Soft, NT, ND  Lymph:  (-)Edema, (-)obvious lymphadenopathy  Skin: Warm to touch, Normal turgor  Psych: Appropriate mood and affect    TELEMETRY: Afib 90-100s      < from: Cardiac Cath Lab - Adult (19 @ 12:28) >  PROCEDURE:  --  Left heart catheterization.  --  Left coronary angiography.  --  Right coronary angiography.  --  Sonosite - Diagnostic.  VENTRICLES: No LV gram was performed; however, a recent echocardiogram  demonstrated normal global and regional LV function.  CORONARY VESSELS: The coronary circulation is right dominant.  LM:   --  Ostial LM: There was a discrete 90 % stenosis.  LAD:   --  Mid LAD: Angiography showed minor luminal irregularities with no  flow limiting lesions. The stent in the mid LAD is patent.  --  Distal LAD: The vessel was small sized. Angiography showed moderate  atherosclerosis.  CX:   --  Mid circumflex: There was a tubular 50 % stenosis at the site of  a prior stent.  RI:   --  Proximal ramus intermedius: There was a diffuse 50 % stenosis at  the site of a prior stent.  RCA:   --  Mid RCA: There was a tubular 50 % stenosis at the site of a  prior stent.  --  RPLS: Angiography showed minor luminal irregularities with no flow  limiting lesions.  COMPLICATIONS: There were no complications.  DIAGNOSTIC RECOMMENDATIONS: Consultation with a cardiac surgeon be obtained  for surgical opinion and coronary artery bypass grafting.  Prepared and signed by  Jessy Méndez M.D.  Signed 2019 13:42:23  HEMODYNAMIC TABLES  Pressures:  NO PHASE  Pressures:  - HR: 69  Pressures:  - Rhythm:  Pressures:  -- Aortic Pressure (S/D/M): 170/70/113  Pressures:  -- Left Ventricle (s/edp): 164/28/--  Outputs:  NO PHASE  Outputs:  -- CALCULATIONS[age]  Outputs:  -- CALCULATIONS: Body Surface Area: 1.92  Outputs:  -- CALCULATIONS: Height in cm: 168.00  Outputs:  -- CALCULATIONS: Sex: Male  Outputs:  -- CALCULATIONS: Weight in k.60  Outputs:  -- OUTPUTS: O2 consumption: 239.38  Outputs:  -- OUTPUTS: Vo2 Indexed: 125.00  < end of copied text >    ASSESSMENT/PLAN: 68 y/o male with PMHx of CAD, HTN, HLD, T2DM, and ESRD on HD presented after abnormal stress test to [facility] for elective cardiac catheterization. Patient admitted post cath after cath revealed severe ostial LM disease. Now transferred to [facility] for CABG. S/p C3L on . Patient developed new onset Afib on .  	   - 4-6 weeks of coumadin and Amio  - Continue ASA, Statin  - On Amio load for Afib  - Monitor BP  - Pain control  - HD per renal  - EP f/u appreciated      Murphy Colin MD, [facility]  BEEPER [phone]

## 2019-09-29 NOTE — PROGRESS NOTE ADULT - SUBJECTIVE AND OBJECTIVE BOX
VITAL SIGNS-Telemetry:  SR 66  Vital Signs Last 24 Hrs  T(C): 36.9 (19 @ 06:37), Max: 37.4 (19 @ 19:52)  T(F): 98.4 (19 @ 06:37), Max: 99.3 (19 @ 19:52)  HR: 69 (19 @ 06:37) (69 - 104)  BP: 164/79 (19 @ 06:37) (107/61 - 176/77)  RR: 18 (19 @ 06:37) (18 - 26)  SpO2: 97% (19 @ 06:37) (95% - 100%)          @ 07:01  -   @ 07:00  --------------------------------------------------------  IN: 1154 mL / OUT: 2150 mL / NET: -996 mL    Daily     Daily Weight in k (28 Sep 2019 15:10)    CAPILLARY BLOOD GLUCOSe  POCT Blood Glucose.: 136 mg/dL (29 Sep 2019 08:13)  POCT Blood Glucose.: 149 mg/dL (28 Sep 2019 21:33)  POCT Blood Glucose.: 155 mg/dL (28 Sep 2019 17:24)  POCT Blood Glucose.: 106 mg/dL (28 Sep 2019 12:37)      Pacing Wires        [  ]   Settings:                                  Isolated  [ x ]  Coumadin    [x ] YES          [  ]      NO         Reason:       PHYSICAL EXAM:  Neurology: alert and oriented x 3, nonfocal, no gross deficits  CV : S1S2  Sternal Wound :  CDI , Stable  Lungs: diminshed bs LLL  Abdomen: soft, nontender, nondistended, positive bowel sounds, last bowel movement       pre-op  Extremities:     RLE inc CDI - + edema r le-no calf tenderness    acetaminophen   Tablet .. 650 milliGRAM(s) Oral every 6 hours PRN  ALBUTerol/ipratropium for Nebulization 3 milliLiter(s) Nebulizer every 6 hours PRN  amiodarone    Tablet 200 milliGRAM(s) Oral daily  amiodarone    Tablet   Oral   aspirin enteric coated 81 milliGRAM(s) Oral daily  atorvastatin 40 milliGRAM(s) Oral at bedtime  dextrose 40% Gel 15 Gram(s) Oral once PRN  dextrose 5%. 1000 milliLiter(s) IV Continuous <Continuous>  dextrose 50% Injectable 12.5 Gram(s) IV Push once  dextrose 50% Injectable 25 Gram(s) IV Push once  dextrose 50% Injectable 25 Gram(s) IV Push once  docusate sodium 100 milliGRAM(s) Oral three times a day  famotidine    Tablet 20 milliGRAM(s) Oral at bedtime  glucagon  Injectable 1 milliGRAM(s) IntraMuscular once PRN  heparin  Infusion 700 Unit(s)/Hr IV Continuous <Continuous>  insulin glargine Injectable (LANTUS) 10 Unit(s) SubCutaneous at bedtime  insulin lispro (HumaLOG) corrective regimen sliding scale   SubCutaneous three times a day before meals  insulin lispro (HumaLOG) corrective regimen sliding scale   SubCutaneous at bedtime  insulin lispro Injectable (HumaLOG) 3 Unit(s) SubCutaneous three times a day before meals  ketotifen 0.025% Ophthalmic Solution 1 Drop(s) Both EYES two times a day  methimazole 5 milliGRAM(s) Oral daily  metoprolol tartrate 25 milliGRAM(s) Oral every 8 hours  sevelamer carbonate 800 milliGRAM(s) Oral three times a day with meals  sodium chloride 0.9% lock flush 3 milliLiter(s) IV Push every 8 hours  tiotropium 18 MICROgram(s) Capsule 1 Capsule(s) Inhalation daily  warfarin 5 milliGRAM(s) Oral once    Physical Therapy Rec:   Home  [  ]   Home w/ PT  [ x ]  Rehab  [  ]  Discussed with Cardiothoracic Team at AM rounds.

## 2019-09-29 NOTE — PROGRESS NOTE ADULT - PROBLEM SELECTOR PLAN 1
continue finger sticks with short acting insulin sliding scale  acceptable at present  endocrinologist following

## 2019-09-29 NOTE — PROGRESS NOTE ADULT - ATTENDING COMMENTS
Agree with above assessment and plan as outlined above.    - Post op afib  - EP eval Dr. Archuleta called    Murphy Colin MD, [facility]  BEEPER [phone]

## 2019-09-29 NOTE — PROGRESS NOTE ADULT - ASSESSMENT
69M ESRD on HD(Tu/Th/Sa), HTN, CAD s/p PCI with 15 stents placed, s/p cardiac cath today which showed LM disease with 90%, now transferred to [facility] for CT Surgery evaluation, now s/p CABG

## 2019-09-29 NOTE — PROGRESS NOTE ADULT - SUBJECTIVE AND OBJECTIVE BOX
Chief complaint  Patient is a 69y old  Male who presents with a chief complaint of sob (29 Sep 2019 11:33)   Review of systems  Patient in bed, looks comfortable, no fever, no new hypoglycemia.    Labs and Fingersticks  CAPILLARY BLOOD GLUCOSE      POCT Blood Glucose.: 136 mg/dL (29 Sep 2019 08:13)  POCT Blood Glucose.: 149 mg/dL (28 Sep 2019 21:33)  POCT Blood Glucose.: 155 mg/dL (28 Sep 2019 17:24)  POCT Blood Glucose.: 106 mg/dL (28 Sep 2019 12:37)      Anion Gap, Serum: 14 (09-29 @ 09:54)  Anion Gap, Serum: 16 (09-28 @ 02:26)      Calcium, Total Serum: 8.9 (09-29 @ 09:54)  Calcium, Total Serum: 8.6 (09-28 @ 02:26)  Albumin, Serum: 3.0 <L> (09-28 @ 02:26)    Alanine Aminotransferase (ALT/SGPT): 21 (09-28 @ 02:26)  Alkaline Phosphatase, Serum: 63 (09-28 @ 02:26)  Aspartate Aminotransferase (AST/SGOT): 16 (09-28 @ 02:26)        09-29    132<L>  |  91<L>  |  42<H>  ----------------------------<  145<H>  4.4   |  27  |  6.09<H>    Ca    8.9      29 Sep 2019 09:54  Phos  5.1     09-28  Mg     2.2     09-28    TPro  5.9<L>  /  Alb  3.0<L>  /  TBili  0.3  /  DBili  x   /  AST  16  /  ALT  21  /  AlkPhos  63  09-28                        9.0    14.2  )-----------( 229      ( 29 Sep 2019 09:54 )             28.9     Medications  MEDICATIONS  (STANDING):  amiodarone    Tablet 200 milliGRAM(s) Oral daily  amiodarone    Tablet   Oral   aspirin enteric coated 81 milliGRAM(s) Oral daily  atorvastatin 40 milliGRAM(s) Oral at bedtime  dextrose 5%. 1000 milliLiter(s) (50 mL/Hr) IV Continuous <Continuous>  dextrose 50% Injectable 12.5 Gram(s) IV Push once  dextrose 50% Injectable 25 Gram(s) IV Push once  dextrose 50% Injectable 25 Gram(s) IV Push once  docusate sodium 100 milliGRAM(s) Oral three times a day  famotidine    Tablet 20 milliGRAM(s) Oral at bedtime  heparin  Infusion 700 Unit(s)/Hr (11 mL/Hr) IV Continuous <Continuous>  insulin glargine Injectable (LANTUS) 10 Unit(s) SubCutaneous at bedtime  insulin lispro (HumaLOG) corrective regimen sliding scale   SubCutaneous three times a day before meals  insulin lispro (HumaLOG) corrective regimen sliding scale   SubCutaneous at bedtime  insulin lispro Injectable (HumaLOG) 3 Unit(s) SubCutaneous three times a day before meals  ketotifen 0.025% Ophthalmic Solution 1 Drop(s) Both EYES two times a day  methimazole 5 milliGRAM(s) Oral daily  metoprolol tartrate 25 milliGRAM(s) Oral every 8 hours  sevelamer carbonate 800 milliGRAM(s) Oral three times a day with meals  sodium chloride 0.9% lock flush 3 milliLiter(s) IV Push every 8 hours  tiotropium 18 MICROgram(s) Capsule 1 Capsule(s) Inhalation daily  warfarin 5 milliGRAM(s) Oral once      Physical Exam  General: Patient comfortable in bed  Vital Signs Last 12 Hrs  T(F): 98.4 (09-29-19 @ 06:37), Max: 98.4 (09-29-19 @ 06:37)  HR: 69 (09-29-19 @ 06:37) (69 - 69)  BP: 164/79 (09-29-19 @ 06:37) (164/79 - 164/79)  BP(mean): --  RR: 18 (09-29-19 @ 06:37) (18 - 18)  SpO2: 97% (09-29-19 @ 06:37) (97% - 97%)  Neck: No palpable thyroid nodules.  CVS: S1S2, No murmurs  Respiratory: No wheezing, no crepitations  GI: Abdomen soft, bowel sounds positive  Musculoskeletal:  edema lower extremities.   Skin: No skin rashes, no ecchymosis    Diagnostics Chief complaint  Patient is a 69y old  Male who presents with a chief complaint of sob (29 Sep 2019 11:33)   Review of systems  Patient in bed, looks comfortable, no fever,  no new hypoglycemia.    Labs and Fingersticks  CAPILLARY BLOOD GLUCOSE      POCT Blood Glucose.: 136 mg/dL (29 Sep 2019 08:13)  POCT Blood Glucose.: 149 mg/dL (28 Sep 2019 21:33)  POCT Blood Glucose.: 155 mg/dL (28 Sep 2019 17:24)  POCT Blood Glucose.: 106 mg/dL (28 Sep 2019 12:37)      Anion Gap, Serum: 14 (09-29 @ 09:54)  Anion Gap, Serum: 16 (09-28 @ 02:26)      Calcium, Total Serum: 8.9 (09-29 @ 09:54)  Calcium, Total Serum: 8.6 (09-28 @ 02:26)  Albumin, Serum: 3.0 <L> (09-28 @ 02:26)    Alanine Aminotransferase (ALT/SGPT): 21 (09-28 @ 02:26)  Alkaline Phosphatase, Serum: 63 (09-28 @ 02:26)  Aspartate Aminotransferase (AST/SGOT): 16 (09-28 @ 02:26)        09-29    132<L>  |  91<L>  |  42<H>  ----------------------------<  145<H>  4.4   |  27  |  6.09<H>    Ca    8.9      29 Sep 2019 09:54  Phos  5.1     09-28  Mg     2.2     09-28    TPro  5.9<L>  /  Alb  3.0<L>  /  TBili  0.3  /  DBili  x   /  AST  16  /  ALT  21  /  AlkPhos  63  09-28                        9.0    14.2  )-----------( 229      ( 29 Sep 2019 09:54 )             28.9     Medications  MEDICATIONS  (STANDING):  amiodarone    Tablet 200 milliGRAM(s) Oral daily  amiodarone    Tablet   Oral   aspirin enteric coated 81 milliGRAM(s) Oral daily  atorvastatin 40 milliGRAM(s) Oral at bedtime  dextrose 5%. 1000 milliLiter(s) (50 mL/Hr) IV Continuous <Continuous>  dextrose 50% Injectable 12.5 Gram(s) IV Push once  dextrose 50% Injectable 25 Gram(s) IV Push once  dextrose 50% Injectable 25 Gram(s) IV Push once  docusate sodium 100 milliGRAM(s) Oral three times a day  famotidine    Tablet 20 milliGRAM(s) Oral at bedtime  heparin  Infusion 700 Unit(s)/Hr (11 mL/Hr) IV Continuous <Continuous>  insulin glargine Injectable (LANTUS) 10 Unit(s) SubCutaneous at bedtime  insulin lispro (HumaLOG) corrective regimen sliding scale   SubCutaneous three times a day before meals  insulin lispro (HumaLOG) corrective regimen sliding scale   SubCutaneous at bedtime  insulin lispro Injectable (HumaLOG) 3 Unit(s) SubCutaneous three times a day before meals  ketotifen 0.025% Ophthalmic Solution 1 Drop(s) Both EYES two times a day  methimazole 5 milliGRAM(s) Oral daily  metoprolol tartrate 25 milliGRAM(s) Oral every 8 hours  sevelamer carbonate 800 milliGRAM(s) Oral three times a day with meals  sodium chloride 0.9% lock flush 3 milliLiter(s) IV Push every 8 hours  tiotropium 18 MICROgram(s) Capsule 1 Capsule(s) Inhalation daily  warfarin 5 milliGRAM(s) Oral once      Physical Exam  General: Patient comfortable in bed  Vital Signs Last 12 Hrs  T(F): 98.4 (09-29-19 @ 06:37), Max: 98.4 (09-29-19 @ 06:37)  HR: 69 (09-29-19 @ 06:37) (69 - 69)  BP: 164/79 (09-29-19 @ 06:37) (164/79 - 164/79)  BP(mean): --  RR: 18 (09-29-19 @ 06:37) (18 - 18)  SpO2: 97% (09-29-19 @ 06:37) (97% - 97%)  Neck: No palpable thyroid nodules.  CVS: S1S2, No murmurs  Respiratory: No wheezing, no crepitations  GI: Abdomen soft, bowel sounds positive  Musculoskeletal:  edema lower extremities.   Skin: No skin rashes, no ecchymosis    Diagnostics

## 2019-09-30 ENCOUNTER — TRANSCRIPTION ENCOUNTER (OUTPATIENT)
Age: 69
End: 2019-09-30

## 2019-09-30 LAB
ALBUMIN SERPL ELPH-MCNC: 2.7 G/DL — LOW (ref 3.3–5)
ALP SERPL-CCNC: 64 U/L — SIGNIFICANT CHANGE UP (ref 40–120)
ALT FLD-CCNC: 18 U/L — SIGNIFICANT CHANGE UP (ref 10–45)
ANION GAP SERPL CALC-SCNC: 17 MMOL/L — SIGNIFICANT CHANGE UP (ref 5–17)
APTT BLD: 61.4 SEC — HIGH (ref 27.5–36.3)
AST SERPL-CCNC: 17 U/L — SIGNIFICANT CHANGE UP (ref 10–40)
BILIRUB SERPL-MCNC: 0.3 MG/DL — SIGNIFICANT CHANGE UP (ref 0.2–1.2)
BUN SERPL-MCNC: 60 MG/DL — HIGH (ref 7–23)
CALCIUM SERPL-MCNC: 8.7 MG/DL — SIGNIFICANT CHANGE UP (ref 8.4–10.5)
CHLORIDE SERPL-SCNC: 90 MMOL/L — LOW (ref 96–108)
CO2 SERPL-SCNC: 22 MMOL/L — SIGNIFICANT CHANGE UP (ref 22–31)
CREAT SERPL-MCNC: 7.54 MG/DL — HIGH (ref 0.5–1.3)
GLUCOSE BLDC GLUCOMTR-MCNC: 112 MG/DL — HIGH (ref 70–99)
GLUCOSE BLDC GLUCOMTR-MCNC: 112 MG/DL — HIGH (ref 70–99)
GLUCOSE BLDC GLUCOMTR-MCNC: 74 MG/DL — SIGNIFICANT CHANGE UP (ref 70–99)
GLUCOSE BLDC GLUCOMTR-MCNC: 79 MG/DL — SIGNIFICANT CHANGE UP (ref 70–99)
GLUCOSE BLDC GLUCOMTR-MCNC: 96 MG/DL — SIGNIFICANT CHANGE UP (ref 70–99)
GLUCOSE SERPL-MCNC: 133 MG/DL — HIGH (ref 70–99)
HCT VFR BLD CALC: 27.4 % — LOW (ref 39–50)
HGB BLD-MCNC: 8.9 G/DL — LOW (ref 13–17)
INR BLD: 1.93 RATIO — HIGH (ref 0.88–1.16)
MCHC RBC-ENTMCNC: 29.5 PG — SIGNIFICANT CHANGE UP (ref 27–34)
MCHC RBC-ENTMCNC: 32.3 GM/DL — SIGNIFICANT CHANGE UP (ref 32–36)
MCV RBC AUTO: 91.5 FL — SIGNIFICANT CHANGE UP (ref 80–100)
PLATELET # BLD AUTO: 270 K/UL — SIGNIFICANT CHANGE UP (ref 150–400)
POTASSIUM SERPL-MCNC: 4.4 MMOL/L — SIGNIFICANT CHANGE UP (ref 3.5–5.3)
POTASSIUM SERPL-SCNC: 4.4 MMOL/L — SIGNIFICANT CHANGE UP (ref 3.5–5.3)
PROT SERPL-MCNC: 6.1 G/DL — SIGNIFICANT CHANGE UP (ref 6–8.3)
PROTHROM AB SERPL-ACNC: 22.4 SEC — HIGH (ref 10–12.9)
RBC # BLD: 3 M/UL — LOW (ref 4.2–5.8)
RBC # FLD: 12.6 % — SIGNIFICANT CHANGE UP (ref 10.3–14.5)
SODIUM SERPL-SCNC: 129 MMOL/L — LOW (ref 135–145)
WBC # BLD: 16.7 K/UL — HIGH (ref 3.8–10.5)
WBC # FLD AUTO: 16.7 K/UL — HIGH (ref 3.8–10.5)

## 2019-09-30 RX ORDER — IPRATROPIUM/ALBUTEROL SULFATE 18-103MCG
3 AEROSOL WITH ADAPTER (GRAM) INHALATION EVERY 6 HOURS
Refills: 0 | Status: DISCONTINUED | OUTPATIENT
Start: 2019-09-30 | End: 2019-10-06

## 2019-09-30 RX ORDER — WARFARIN SODIUM 2.5 MG/1
3 TABLET ORAL ONCE
Refills: 0 | Status: COMPLETED | OUTPATIENT
Start: 2019-09-30 | End: 2019-09-30

## 2019-09-30 RX ADMIN — Medication 100 MILLIGRAM(S): at 05:53

## 2019-09-30 RX ADMIN — Medication 6 UNIT(S): at 12:52

## 2019-09-30 RX ADMIN — Medication 6 UNIT(S): at 08:07

## 2019-09-30 RX ADMIN — ATORVASTATIN CALCIUM 40 MILLIGRAM(S): 80 TABLET, FILM COATED ORAL at 22:17

## 2019-09-30 RX ADMIN — AMIODARONE HYDROCHLORIDE 200 MILLIGRAM(S): 400 TABLET ORAL at 05:53

## 2019-09-30 RX ADMIN — FAMOTIDINE 20 MILLIGRAM(S): 10 INJECTION INTRAVENOUS at 22:15

## 2019-09-30 RX ADMIN — Medication 3 MILLILITER(S): at 08:10

## 2019-09-30 RX ADMIN — Medication 25 MILLIGRAM(S): at 14:48

## 2019-09-30 RX ADMIN — SEVELAMER CARBONATE 800 MILLIGRAM(S): 2400 POWDER, FOR SUSPENSION ORAL at 08:09

## 2019-09-30 RX ADMIN — Medication 100 MILLIGRAM(S): at 22:17

## 2019-09-30 RX ADMIN — SEVELAMER CARBONATE 800 MILLIGRAM(S): 2400 POWDER, FOR SUSPENSION ORAL at 17:34

## 2019-09-30 RX ADMIN — HEPARIN SODIUM 13 UNIT(S)/HR: 5000 INJECTION INTRAVENOUS; SUBCUTANEOUS at 14:57

## 2019-09-30 RX ADMIN — SODIUM CHLORIDE 3 MILLILITER(S): 9 INJECTION INTRAMUSCULAR; INTRAVENOUS; SUBCUTANEOUS at 22:08

## 2019-09-30 RX ADMIN — Medication 25 MILLIGRAM(S): at 22:15

## 2019-09-30 RX ADMIN — WARFARIN SODIUM 3 MILLIGRAM(S): 2.5 TABLET ORAL at 22:15

## 2019-09-30 RX ADMIN — Medication 81 MILLIGRAM(S): at 11:09

## 2019-09-30 RX ADMIN — SODIUM CHLORIDE 3 MILLILITER(S): 9 INJECTION INTRAMUSCULAR; INTRAVENOUS; SUBCUTANEOUS at 14:48

## 2019-09-30 RX ADMIN — Medication 25 MILLIGRAM(S): at 05:53

## 2019-09-30 RX ADMIN — Medication 3 MILLILITER(S): at 22:17

## 2019-09-30 RX ADMIN — INSULIN GLARGINE 14 UNIT(S): 100 INJECTION, SOLUTION SUBCUTANEOUS at 22:17

## 2019-09-30 RX ADMIN — KETOTIFEN FUMARATE 1 DROP(S): 0.34 SOLUTION OPHTHALMIC at 05:54

## 2019-09-30 RX ADMIN — Medication 100 MILLIGRAM(S): at 14:47

## 2019-09-30 RX ADMIN — Medication 3 MILLILITER(S): at 17:34

## 2019-09-30 RX ADMIN — KETOTIFEN FUMARATE 1 DROP(S): 0.34 SOLUTION OPHTHALMIC at 17:33

## 2019-09-30 RX ADMIN — SEVELAMER CARBONATE 800 MILLIGRAM(S): 2400 POWDER, FOR SUSPENSION ORAL at 12:52

## 2019-09-30 RX ADMIN — SODIUM CHLORIDE 3 MILLILITER(S): 9 INJECTION INTRAMUSCULAR; INTRAVENOUS; SUBCUTANEOUS at 05:52

## 2019-09-30 NOTE — DISCHARGE NOTE PROVIDER - NSDCFUADDINST_GEN_ALL_CORE_FT
Follow Do's and Don'ts of cardiac surgery instruction packet for care and activity at home.   Continue to take your prescribed medications as directed.   Follow up with Dr. Vasquez at CTS office at [facility] 1 week after discharge, call [phone] on Monday 10/7 to schedule and confirm appointment.  Follow up w/ your primary care doctor and your cardiologist in 2 weeks, call to schedule an appointment.   Continue to take your coumadin as instructed.   Have your INR checked Tuesday 10/8/19 and have Dr. Mackey follow up w. results and have your coumadin dosing per INR.

## 2019-09-30 NOTE — DISCHARGE NOTE PROVIDER - NSDCCPCAREPLAN_GEN_ALL_CORE_FT
PRINCIPAL DISCHARGE DIAGNOSIS  Diagnosis: Coronary disease  Assessment and Plan of Treatment: sp  CABG

## 2019-09-30 NOTE — DISCHARGE NOTE PROVIDER - NSDCPNSUBOBJ_GEN_ALL_CORE
PHYSICAL EXAM        Neurology: alert and oriented x 3, nonfocal, no gross deficits        CV: (+) S1 and S2, No murmurs, rubs, gallops or clicks         Sternal Wound:  MSI -->CDI , sternum stable        Lungs: Diminished at base B/L         Abdomen: soft, nontender, nondistended, positive bowel sounds, (+) Flatus; (+) BM         : Bladder non tender / non distended                    Extremities:  B/L LE (+) 1 edema; negative calf tenderness; (+) 2 DP palpable; LUE AV fistula --> (+) Bruit; (+) Thrills.            More than 30 mins spent on total encounter, patient counseling and discharge instructions.

## 2019-09-30 NOTE — PROGRESS NOTE ADULT - PROBLEM SELECTOR PLAN 1
Due for HD tomorrow  will plan for routine HD  TTS schedule  trend bmp  Hyponatremia, unrestricted sodium diet

## 2019-09-30 NOTE — DISCHARGE NOTE PROVIDER - CARE PROVIDER_API CALL
Niles Vasquez)  Thoracic and Cardiac Surgery  [address] [place] [address]  Phone: [phone]  Fax: [phone]  Follow Up Time: Niles Vasquez)  Thoracic and Cardiac Surgery  [address] [place] [address]  Phone: [phone]  Fax: [phone]  Follow Up Time:     Kalie Lau)  Cardiovascular Disease; Internal Medicine; Interventional Cardiology  [address] [facility] [address]  [place] [address]  Phone: [phone]  Fax: [phone]  Follow Up Time:     Renan Bowen)  Internal Medicine; Nephrology  [address] [place] [address]  Phone: [phone]  Fax: [phone]  Follow Up Time:     Yg Mackey)  EndocrinologyMetabDiabetes; Internal Medicine  [address] [place] [address]  Phone: [phone]  Fax: [phone]  Follow Up Time:

## 2019-09-30 NOTE — PROGRESS NOTE ADULT - SUBJECTIVE AND OBJECTIVE BOX
Patient is a 69y old  Male who presents with a chief complaint of CAD (29 Sep 2019 15:02)      SUBJECTIVE / OVERNIGHT EVENTS: overnight events noted    ROS:  Resp: No cough no sputum production  CVS: No chest pain no palpitations no orthopnea  GI: no N/V/D  : no dysuria, no hematuria  Neuro: no weakness no paresthesias  Heme: No petechiae no easy bruising  Msk: No joint pain no swelling  Skin: No rash no itching        MEDICATIONS  (STANDING):  amiodarone    Tablet 200 milliGRAM(s) Oral daily  amiodarone    Tablet   Oral   aspirin enteric coated 81 milliGRAM(s) Oral daily  atorvastatin 40 milliGRAM(s) Oral at bedtime  dextrose 5%. 1000 milliLiter(s) (50 mL/Hr) IV Continuous <Continuous>  dextrose 50% Injectable 12.5 Gram(s) IV Push once  dextrose 50% Injectable 25 Gram(s) IV Push once  dextrose 50% Injectable 25 Gram(s) IV Push once  docusate sodium 100 milliGRAM(s) Oral three times a day  famotidine    Tablet 20 milliGRAM(s) Oral at bedtime  heparin  Infusion 700 Unit(s)/Hr (13 mL/Hr) IV Continuous <Continuous>  insulin glargine Injectable (LANTUS) 14 Unit(s) SubCutaneous at bedtime  insulin lispro (HumaLOG) corrective regimen sliding scale   SubCutaneous three times a day before meals  insulin lispro (HumaLOG) corrective regimen sliding scale   SubCutaneous at bedtime  insulin lispro Injectable (HumaLOG) 6 Unit(s) SubCutaneous three times a day before meals  ketotifen 0.025% Ophthalmic Solution 1 Drop(s) Both EYES two times a day  methimazole 5 milliGRAM(s) Oral daily  metoprolol tartrate 25 milliGRAM(s) Oral every 8 hours  sevelamer carbonate 800 milliGRAM(s) Oral three times a day with meals  sodium chloride 0.9% lock flush 3 milliLiter(s) IV Push every 8 hours  tiotropium 18 MICROgram(s) Capsule 1 Capsule(s) Inhalation daily    MEDICATIONS  (PRN):  acetaminophen   Tablet .. 650 milliGRAM(s) Oral every 6 hours PRN Mild Pain (1 - 3)  ALBUTerol/ipratropium for Nebulization 3 milliLiter(s) Nebulizer every 6 hours PRN Shortness of Breath and/or Wheezing  dextrose 40% Gel 15 Gram(s) Oral once PRN Blood Glucose LESS THAN 70 milliGRAM(s)/deciLiter  glucagon  Injectable 1 milliGRAM(s) IntraMuscular once PRN Glucose <70 milliGRAM(s)/deciLiter        CAPILLARY BLOOD GLUCOSE      POCT Blood Glucose.: 79 mg/dL (30 Sep 2019 08:02)  POCT Blood Glucose.: 112 mg/dL (30 Sep 2019 00:51)  POCT Blood Glucose.: 167 mg/dL (29 Sep 2019 21:38)  POCT Blood Glucose.: 173 mg/dL (29 Sep 2019 17:55)  POCT Blood Glucose.: 205 mg/dL (29 Sep 2019 12:25)    I&O's Summary    29 Sep 2019 07:01  -  30 Sep 2019 07:00  --------------------------------------------------------  IN: 527 mL / OUT: 0 mL / NET: 527 mL        Vital Signs Last 24 Hrs  T(C): 36.4 (30 Sep 2019 05:39), Max: 37.1 (29 Sep 2019 19:52)  T(F): 97.5 (30 Sep 2019 05:39), Max: 98.7 (29 Sep 2019 19:52)  HR: 60 (30 Sep 2019 05:39) (58 - 89)  BP: 149/80 (30 Sep 2019 05:39) (125/64 - 149/80)  BP(mean): --  RR: 18 (30 Sep 2019 05:39) (18 - 18)  SpO2: 92% (30 Sep 2019 05:39) (92% - 97%)    PHYSICAL EXAM:  GENERAL: NAD, well-developed  HEAD:  Atraumatic, Normocephalic  EYES: EOMI, PERRLA, conjunctiva and sclera clear  NECK: Supple, No JVD  CHEST/LUNG: Clear to auscultation bilaterally; decreased breath sounds at bases   sternal wound with bandage   HEART: S1S2; soft ejection systolic murmur best heard at left sternal border no rub no gallop   ABDOMEN: Soft, Nontender, Nondistended; Bowel sounds present  EXTREMITIES:  + Peripheral Pulses, No clubbing or cyanosis, 1 + edema  PSYCH: AO x 3,   NEUROLOGY: Alert, no focal motor or sensory deficits  SKIN: No rashes or lesions    LABS:                        8.9    16.7  )-----------( 270      ( 30 Sep 2019 02:26 )             27.4     09-30    129<L>  |  90<L>  |  60<H>  ----------------------------<  133<H>  4.4   |  22  |  7.54<H>    Ca    8.7      30 Sep 2019 02:26    TPro  6.1  /  Alb  2.7<L>  /  TBili  0.3  /  DBili  x   /  AST  17  /  ALT  18  /  AlkPhos  64  09-30    PT/INR - ( 30 Sep 2019 02:26 )   PT: 22.4 sec;   INR: 1.93 ratio         PTT - ( 30 Sep 2019 02:26 )  PTT:61.4 sec            All consultant(s) notes reviewed and care discussed with other providers        Contact Number, Dr Cagle [phone]

## 2019-09-30 NOTE — PROGRESS NOTE ADULT - ATTENDING COMMENTS
EP ATTENDING    Agree with above. He is a pleasant 68 y/o male PMH ESRD on HD and CAD s/p multiple PCIs from 6311-0389. He now is s/p a CABG, and had post-op AF. Review of all EKGs demonstrates no evidence of pre-existing AF. His LVEF remains normal. He denies palpitation, syncope, nor angina.    -agree with 4-6 weeks of coumadin/amio  -shouldn't need long term a/c as AF was only post-op

## 2019-09-30 NOTE — DISCHARGE NOTE PROVIDER - NSDCFUADDAPPT_GEN_ALL_CORE_FT
see Dr Vasquez  see your cardiliogist and PCP in 2 weeks Follow Do's and Don'ts of cardiac surgery instruction packet for care and activity at home.   Continue to take your prescribed medications as directed.   Follow up with Dr. Vasquez at CTS office at [facility] 1 week after discharge, call [phone] on Monday 10/7 to schedule and confirm appointment.  Follow up w/ your nephrologist 1-2 weeks after discharge.   Follow up w/ your primary care doctor and your cardiologist in 2 weeks, call to schedule an appointment.   Follow up w/ Endocrinologist Dr. Mackey in 2 weeks.   Continue to take your coumadin as instructed.   Have your INR checked Tuesday 10/8/19 and have Dr. Mackey follow up w. results and have your coumadin dosing per INR. Follow Do's and Don'ts of cardiac surgery instruction packet for care and activity at home.   Continue to take your prescribed medications as directed.   Follow up with Dr. Vasquez at CTS office at [facility] 1 week after discharge, call [phone] on Monday 10/7 to schedule and confirm appointment.  Follow up w/ your nephrologist 1-2 weeks after discharge.   Follow up w/ your primary care doctor and your cardiologist in 2 weeks, call to schedule an appointment.   Follow up w/ Endocrinologist Dr. Mackey in 2 weeks.   Continue to take your coumadin as instructed.   Have your INR checked Tuesday 10/8/19 and have Dr. Mackey ( [phone] ) follow up w. results and have your coumadin dosing per INR.

## 2019-09-30 NOTE — PROGRESS NOTE ADULT - ASSESSMENT
69 y.o. male with pmh of ESRD( HD on tu/Th/sa) at [facility] via LUE AVF. Last dialyzed yesterday 9/14, was sent to [facility] for cath after presenting with SOB and a positive stress tests/p CABG 9/23/19 with new onset afib ;Renal following for ESRD Mx.    labs, chart reviewed

## 2019-09-30 NOTE — PROGRESS NOTE ADULT - SUBJECTIVE AND OBJECTIVE BOX
VITAL SIGNS    Telemetry:  SR    60-70    Vital Signs Last 24 Hrs  T(C): 36.4 (19 @ 05:39), Max: 37.1 (19 @ 19:52)  T(F): 97.5 (19 @ 05:39), Max: 98.7 (19 @ 19:52)  HR: 60 (19 @ 05:39) (58 - 89)  BP: 149/80 (19 @ 05:39) (125/64 - 149/80)  RR: 18 (19 @ 13:09) (18 - 18)  SpO2: 95% (19 @ 13:09) (83% - 97%)                   Daily     Daily Weight in k.2 (30 Sep 2019 08:37)      Bilirubin Total, Serum: 0.3 mg/dL ( @ 02:26)    CAPILLARY BLOOD GLUCOSE      POCT Blood Glucose.: 96 mg/dL (30 Sep 2019 12:40)  POCT Blood Glucose.: 79 mg/dL (30 Sep 2019 08:02)  POCT Blood Glucose.: 112 mg/dL (30 Sep 2019 00:51)  POCT Blood Glucose.: 167 mg/dL (29 Sep 2019 21:38)  POCT Blood Glucose.: 173 mg/dL (29 Sep 2019 17:55)          Pacing Wires                                Isolated    Coumadin    YES                           PHYSICAL EXAM    Neurology: alert and oriented x 2-3, moves all extremities with no defecits,  forgetfull  CV :  RRR  Sternal Wound :  CDI , Stable  + pw  Lungs: bl  diminshed at bases  Abdomen: soft, nontender, nondistended, positive bowel sounds, last bowel movement     Extremities:       treace pedal edema   no calf tenderness

## 2019-09-30 NOTE — DISCHARGE NOTE PROVIDER - PROVIDER TOKENS
PROVIDER:[TOKEN:[[id number]]] PROVIDER:[TOKEN:[[id number]]],PROVIDER:[TOKEN:[[id number]]],PROVIDER:[TOKEN:[[id number]]],PROVIDER:[TOKEN:[[id number]]]

## 2019-09-30 NOTE — DISCHARGE NOTE PROVIDER - HOSPITAL COURSE
69 yr old male s/p C3l with PMHx: ESRD, CoRS stents, hyperthyroid, DM, CVA, MI    preop endo consult for Hyperthyroid and hyperglycemia    post op afib - amio load    9/28 Coumadin dosing ,  afib 110 bp stable amio load and Lopressor 25 q 8, confused-chair alarm  dc percocet    9/29 VSS - LLL diminished - chest pt given - spirometry encouraged oob - o2 weened off - will ck o2 sat. INR 1.53 5mg Coumadin given    d/c planning home Tues?    9/30 VSS; INR 1.93 coumadin 3 mg,  02 sats  86 percent room air  + rhonchi  on nebs, forgetful no narcotics wbc 16 afebrile; urinalysis       10/1 Chest xray  atelectasis vs pl effusion B/L,   on bipap this am  now tolerating 3 Liters NC,    OOB to chair,   HD this am,  now in NSR    10/2 SR, Large left pleural effusion to be tapped when INR <1.8--FFP if INR still elevated in am.  HD as per renal--fluid overload--renal will dialyze tomorrow and increase UF    10/3  VSS stable.  INR 2.1 --2 FFP with HD recheck INR and perform left thoracentesis if INR <2.  HD today.  Continue to hold coumadin as patient is in SR    10/4 VVS; HD today for UF as per Renal.  Maintain Left pigtail --> LWS. CXR in AM.  HD in AM and plan to D/C home with PT post HD per Dr. Vasquez.     10/5 Patient desaturated 85% on activity --> Placed on 2 Liters NC.  Left pigtail cath D/C'd. HD today --> Afib . Lopressor increased to 50 QID Will re-assess ambulation off 02 post HD.  Plan to D/C home w/ possible home 02.    10/6 HD stable, 97% on RA, INR 2.2. Cleared for D/c. Toprol 100 BID

## 2019-09-30 NOTE — DISCHARGE NOTE PROVIDER - CARE PROVIDERS DIRECT ADDRESSES
,[email or web address],DirectAddress_Unknown,DirectAddress_Unknown,DirectAddress_Unknown

## 2019-09-30 NOTE — PROGRESS NOTE ADULT - SUBJECTIVE AND OBJECTIVE BOX
S: Denies chest pain or SOB. Review of systems otherwise (-)      MEDICATIONS  (STANDING):  ALBUTerol/ipratropium for Nebulization 3 milliLiter(s) Nebulizer every 6 hours  amiodarone    Tablet 200 milliGRAM(s) Oral daily  amiodarone    Tablet   Oral   aspirin enteric coated 81 milliGRAM(s) Oral daily  atorvastatin 40 milliGRAM(s) Oral at bedtime  dextrose 5%. 1000 milliLiter(s) (50 mL/Hr) IV Continuous <Continuous>  dextrose 50% Injectable 12.5 Gram(s) IV Push once  dextrose 50% Injectable 25 Gram(s) IV Push once  dextrose 50% Injectable 25 Gram(s) IV Push once  docusate sodium 100 milliGRAM(s) Oral three times a day  famotidine    Tablet 20 milliGRAM(s) Oral at bedtime  heparin  Infusion 700 Unit(s)/Hr (13 mL/Hr) IV Continuous <Continuous>  insulin glargine Injectable (LANTUS) 14 Unit(s) SubCutaneous at bedtime  insulin lispro (HumaLOG) corrective regimen sliding scale   SubCutaneous three times a day before meals  insulin lispro (HumaLOG) corrective regimen sliding scale   SubCutaneous at bedtime  ketotifen 0.025% Ophthalmic Solution 1 Drop(s) Both EYES two times a day  methimazole 5 milliGRAM(s) Oral daily  metoprolol tartrate 25 milliGRAM(s) Oral every 8 hours  sevelamer carbonate 800 milliGRAM(s) Oral three times a day with meals  sodium chloride 0.9% lock flush 3 milliLiter(s) IV Push every 8 hours  tiotropium 18 MICROgram(s) Capsule 1 Capsule(s) Inhalation daily  warfarin 3 milliGRAM(s) Oral once    MEDICATIONS  (PRN):  acetaminophen   Tablet .. 650 milliGRAM(s) Oral every 6 hours PRN Mild Pain (1 - 3)  dextrose 40% Gel 15 Gram(s) Oral once PRN Blood Glucose LESS THAN 70 milliGRAM(s)/deciLiter  glucagon  Injectable 1 milliGRAM(s) IntraMuscular once PRN Glucose <70 milliGRAM(s)/deciLiter      LABS:                            8.9    16.7  )-----------( 270      ( 30 Sep 2019 02:26 )             27.4     Hemoglobin: 8.9 g/dL ( @ 02:26)  Hemoglobin: 9.0 g/dL ( @ 09:54)  Hemoglobin: 8.9 g/dL ( @ 02:26)  Hemoglobin: 9.2 g/dL ( @ 00:54)  Hemoglobin: 9.1 g/dL ( @ 00:35)        129<L>  |  90<L>  |  60<H>  ----------------------------<  133<H>  4.4   |  22  |  7.54<H>    Ca    8.7      30 Sep 2019 02:26    TPro  6.1  /  Alb  2.7<L>  /  TBili  0.3  /  DBili  x   /  AST  17  /  ALT  18  /  AlkPhos  64      Creatinine Trend: 7.54<--, 6.09<--, 6.08<--, 7.36<--, 5.36<--, 6.23<--   PT/INR - ( 30 Sep 2019 02:26 )   PT: 22.4 sec;   INR: 1.93 ratio         PTT - ( 30 Sep 2019 02:26 )  PTT:61.4 sec      19 @ 07:01  -  19 @ 07:00  --------------------------------------------------------  IN: 527 mL / OUT: 0 mL / NET: 527 mL    19 @ 07:01  -  19 @ 14:15  --------------------------------------------------------  IN: 480 mL / OUT: 0 mL / NET: 480 mL        PHYSICAL EXAM  Vital Signs Last 24 Hrs  T(C): 36.4 (30 Sep 2019 05:39), Max: 37.1 (29 Sep 2019 19:52)  T(F): 97.5 (30 Sep 2019 05:39), Max: 98.7 (29 Sep 2019 19:52)  HR: 60 (30 Sep 2019 05:39) (60 - 89)  BP: 149/80 (30 Sep 2019 05:39) (125/64 - 149/80)  BP(mean): --  RR: 18 (30 Sep 2019 13:09) (18 - 18)  SpO2: 95% (30 Sep 2019 13:09) (83% - 97%)      Gen: NAD  HEENT:  (-)icterus (-)pallor  CV: N S1 S2 1/6 DEJAH (+)2 Pulses B/l  Resp:  Coarse BS B/L, normal effort  GI: (+) BS Soft, NT, ND  Lymph:  (-)Edema, (-)obvious lymphadenopathy  Skin: Warm to touch, Normal turgor  Psych: Appropriate mood and affect    TELEMETRY: SB/SR 50-60s      < from: Cardiac Cath Lab - Adult (19 @ 12:28) >  PROCEDURE:  --  Left heart catheterization.  --  Left coronary angiography.  --  Right coronary angiography.  --  Sonosite - Diagnostic.  VENTRICLES: No LV gram was performed; however, a recent echocardiogram  demonstrated normal global and regional LV function.  CORONARY VESSELS: The coronary circulation is right dominant.  LM:   --  Ostial LM: There was a discrete 90 % stenosis.  LAD:   --  Mid LAD: Angiography showed minor luminal irregularities with no  flow limiting lesions. The stent in the mid LAD is patent.  --  Distal LAD: The vessel was small sized. Angiography showed moderate  atherosclerosis.  CX:   --  Mid circumflex: There was a tubular 50 % stenosis at the site of  a prior stent.  RI:   --  Proximal ramus intermedius: There was a diffuse 50 % stenosis at  the site of a prior stent.  RCA:   --  Mid RCA: There was a tubular 50 % stenosis at the site of a  prior stent.  --  RPLS: Angiography showed minor luminal irregularities with no flow  limiting lesions.  COMPLICATIONS: There were no complications.  DIAGNOSTIC RECOMMENDATIONS: Consultation with a cardiac surgeon be obtained  for surgical opinion and coronary artery bypass grafting.  Prepared and signed by  Jessy Méndez M.D.  Signed 2019 13:42:23  HEMODYNAMIC TABLES  Pressures:  NO PHASE  Pressures:  - HR: 69  Pressures:  - Rhythm:  Pressures:  -- Aortic Pressure (S/D/M): 170/70/113  Pressures:  -- Left Ventricle (s/edp): 164/28/--  Outputs:  NO PHASE  Outputs:  -- CALCULATIONS[age]  Outputs:  -- CALCULATIONS: Body Surface Area: 1.92  Outputs:  -- CALCULATIONS: Height in cm: 168.00  Outputs:  -- CALCULATIONS: Sex: Male  Outputs:  -- CALCULATIONS: Weight in k.60  Outputs:  -- OUTPUTS: O2 consumption: 239.38  Outputs:  -- OUTPUTS: Vo2 Indexed: 125.00  < end of copied text >    ASSESSMENT/PLAN: 70 y/o male with PMHx of CAD, HTN, HLD, T2DM, and ESRD on HD presented after abnormal stress test to [facility] for elective cardiac catheterization. Patient admitted post cath after cath revealed severe ostial LM disease. Now transferred to [facility] for CABG. S/p C3L on . Patient developed new onset Afib on .  	   - 4-6 weeks of coumadin and Amio  - Continue ASA, Statin  - On Amio load for Afib  - Monitor BP  - Pain control  - HD per renal  - EP f/u appreciated  - Supportive care per CTS    Wilfred Robin PA-C  [facility]  Pager: [phone] S: Denies chest pain or SOB. Review of systems otherwise (-)      MEDICATIONS  (STANDING):  ALBUTerol/ipratropium for Nebulization 3 milliLiter(s) Nebulizer every 6 hours  amiodarone    Tablet 200 milliGRAM(s) Oral daily  amiodarone    Tablet   Oral   aspirin enteric coated 81 milliGRAM(s) Oral daily  atorvastatin 40 milliGRAM(s) Oral at bedtime  dextrose 5%. 1000 milliLiter(s) (50 mL/Hr) IV Continuous <Continuous>  dextrose 50% Injectable 12.5 Gram(s) IV Push once  dextrose 50% Injectable 25 Gram(s) IV Push once  dextrose 50% Injectable 25 Gram(s) IV Push once  docusate sodium 100 milliGRAM(s) Oral three times a day  famotidine    Tablet 20 milliGRAM(s) Oral at bedtime  heparin  Infusion 700 Unit(s)/Hr (13 mL/Hr) IV Continuous <Continuous>  insulin glargine Injectable (LANTUS) 14 Unit(s) SubCutaneous at bedtime  insulin lispro (HumaLOG) corrective regimen sliding scale   SubCutaneous three times a day before meals  insulin lispro (HumaLOG) corrective regimen sliding scale   SubCutaneous at bedtime  ketotifen 0.025% Ophthalmic Solution 1 Drop(s) Both EYES two times a day  methimazole 5 milliGRAM(s) Oral daily  metoprolol tartrate 25 milliGRAM(s) Oral every 8 hours  sevelamer carbonate 800 milliGRAM(s) Oral three times a day with meals  sodium chloride 0.9% lock flush 3 milliLiter(s) IV Push every 8 hours  tiotropium 18 MICROgram(s) Capsule 1 Capsule(s) Inhalation daily  warfarin 3 milliGRAM(s) Oral once    MEDICATIONS  (PRN):  acetaminophen   Tablet .. 650 milliGRAM(s) Oral every 6 hours PRN Mild Pain (1 - 3)  dextrose 40% Gel 15 Gram(s) Oral once PRN Blood Glucose LESS THAN 70 milliGRAM(s)/deciLiter  glucagon  Injectable 1 milliGRAM(s) IntraMuscular once PRN Glucose <70 milliGRAM(s)/deciLiter      LABS:                            8.9    16.7  )-----------( 270      ( 30 Sep 2019 02:26 )             27.4     Hemoglobin: 8.9 g/dL ( @ 02:26)  Hemoglobin: 9.0 g/dL ( @ 09:54)  Hemoglobin: 8.9 g/dL ( @ 02:26)  Hemoglobin: 9.2 g/dL ( @ 00:54)  Hemoglobin: 9.1 g/dL ( @ 00:35)        129<L>  |  90<L>  |  60<H>  ----------------------------<  133<H>  4.4   |  22  |  7.54<H>    Ca    8.7      30 Sep 2019 02:26    TPro  6.1  /  Alb  2.7<L>  /  TBili  0.3  /  DBili  x   /  AST  17  /  ALT  18  /  AlkPhos  64      Creatinine Trend: 7.54<--, 6.09<--, 6.08<--, 7.36<--, 5.36<--, 6.23<--   PT/INR - ( 30 Sep 2019 02:26 )   PT: 22.4 sec;   INR: 1.93 ratio         PTT - ( 30 Sep 2019 02:26 )  PTT:61.4 sec      19 @ 07:01  -  19 @ 07:00  --------------------------------------------------------  IN: 527 mL / OUT: 0 mL / NET: 527 mL    19 @ 07:01  -  19 @ 14:15  --------------------------------------------------------  IN: 480 mL / OUT: 0 mL / NET: 480 mL        PHYSICAL EXAM  Vital Signs Last 24 Hrs  T(C): 36.4 (30 Sep 2019 05:39), Max: 37.1 (29 Sep 2019 19:52)  T(F): 97.5 (30 Sep 2019 05:39), Max: 98.7 (29 Sep 2019 19:52)  HR: 60 (30 Sep 2019 05:39) (60 - 89)  BP: 149/80 (30 Sep 2019 05:39) (125/64 - 149/80)  BP(mean): --  RR: 18 (30 Sep 2019 13:09) (18 - 18)  SpO2: 95% (30 Sep 2019 13:09) (83% - 97%)      Gen: NAD  HEENT:  (-)icterus (-)pallor  CV: N S1 S2 1/6 DEJAH (+)2 Pulses B/l  Resp:  Coarse BS B/L, normal effort  GI: (+) BS Soft, NT, ND  Lymph:  (-)Edema, (-)obvious lymphadenopathy  Skin: Warm to touch, Normal turgor  Psych: Appropriate mood and affect    TELEMETRY: SB/SR 50-60s      < from: Cardiac Cath Lab - Adult (19 @ 12:28) >  PROCEDURE:  --  Left heart catheterization.  --  Left coronary angiography.  --  Right coronary angiography.  --  Sonosite - Diagnostic.  VENTRICLES: No LV gram was performed; however, a recent echocardiogram  demonstrated normal global and regional LV function.  CORONARY VESSELS: The coronary circulation is right dominant.  LM:   --  Ostial LM: There was a discrete 90 % stenosis.  LAD:   --  Mid LAD: Angiography showed minor luminal irregularities with no  flow limiting lesions. The stent in the mid LAD is patent.  --  Distal LAD: The vessel was small sized. Angiography showed moderate  atherosclerosis.  CX:   --  Mid circumflex: There was a tubular 50 % stenosis at the site of  a prior stent.  RI:   --  Proximal ramus intermedius: There was a diffuse 50 % stenosis at  the site of a prior stent.  RCA:   --  Mid RCA: There was a tubular 50 % stenosis at the site of a  prior stent.  --  RPLS: Angiography showed minor luminal irregularities with no flow  limiting lesions.  COMPLICATIONS: There were no complications.  DIAGNOSTIC RECOMMENDATIONS: Consultation with a cardiac surgeon be obtained  for surgical opinion and coronary artery bypass grafting.  Prepared and signed by  Jessy Méndez M.D.  Signed 2019 13:42:23  HEMODYNAMIC TABLES  Pressures:  NO PHASE  Pressures:  - HR: 69  Pressures:  - Rhythm:  Pressures:  -- Aortic Pressure (S/D/M): 170/70/113  Pressures:  -- Left Ventricle (s/edp): 164/28/--  Outputs:  NO PHASE  Outputs:  -- CALCULATIONS[age]  Outputs:  -- CALCULATIONS: Body Surface Area: 1.92  Outputs:  -- CALCULATIONS: Height in cm: 168.00  Outputs:  -- CALCULATIONS: Sex: Male  Outputs:  -- CALCULATIONS: Weight in k.60  Outputs:  -- OUTPUTS: O2 consumption: 239.38  Outputs:  -- OUTPUTS: Vo2 Indexed: 125.00  < end of copied text >    ASSESSMENT/PLAN: 70 y/o male with PMHx of CAD, HTN, HLD, T2DM, and ESRD on HD presented after abnormal stress test to [facility] for elective cardiac catheterization. Patient admitted post cath after cath revealed severe ostial LM disease. Now transferred to [facility] for CABG. S/p C3L on . Patient developed new onset Afib on .  	   - 4-6 weeks of coumadin and Amio  - Continue ASA, Stati  - Monitor BP  - Pain control  - HD per renal  - Supportive care per CTS    [facility]  Pager: [phone]

## 2019-09-30 NOTE — PROGRESS NOTE ADULT - ASSESSMENT
69M ESRD on HD(Tu/Th/Sa), HTN, CAD s/p PCI with 15 stents placed, s/p cardiac cath today which showed LM disease with 90%, now transferred to [facility] for CT Surgery evaluation, now s/p CABG

## 2019-09-30 NOTE — PROGRESS NOTE ADULT - ASSESSMENT
Assessment  DMT2: 69y Male with DM T2 with hyperglycemia, was on oral meds at home, now on insulin, blood sugars still low, no new hypoglycemic episode, will DC pre meal Humalog, eating partial meals, appears comfortable.  CAD: Postop CABG 9/23, on medications, no chest pain, stable, monitored.  HTN: Controlled, on antihypertensive medications.  Hyperthyroidism: On low dose Methimazole. New TFTs WNL: TSH 1.48, Free T4 1.3.  ESRD: Monitor labs/BMP            Yg Mackey MD  Cell: [phone]  Office: [phone]

## 2019-09-30 NOTE — PROGRESS NOTE ADULT - SUBJECTIVE AND OBJECTIVE BOX
Patient denies CP, SOB Review of systems otherwise (-)    MEDICATIONS  (STANDING):  ALBUTerol/ipratropium for Nebulization 3 milliLiter(s) Nebulizer every 6 hours  amiodarone    Tablet 200 milliGRAM(s) Oral daily  amiodarone    Tablet   Oral   aspirin enteric coated 81 milliGRAM(s) Oral daily  atorvastatin 40 milliGRAM(s) Oral at bedtime  dextrose 5%. 1000 milliLiter(s) (50 mL/Hr) IV Continuous <Continuous>  dextrose 50% Injectable 12.5 Gram(s) IV Push once  dextrose 50% Injectable 25 Gram(s) IV Push once  dextrose 50% Injectable 25 Gram(s) IV Push once  docusate sodium 100 milliGRAM(s) Oral three times a day  famotidine    Tablet 20 milliGRAM(s) Oral at bedtime  heparin  Infusion 700 Unit(s)/Hr (13 mL/Hr) IV Continuous <Continuous>  insulin glargine Injectable (LANTUS) 14 Unit(s) SubCutaneous at bedtime  insulin lispro (HumaLOG) corrective regimen sliding scale   SubCutaneous three times a day before meals  insulin lispro (HumaLOG) corrective regimen sliding scale   SubCutaneous at bedtime  ketotifen 0.025% Ophthalmic Solution 1 Drop(s) Both EYES two times a day  methimazole 5 milliGRAM(s) Oral daily  metoprolol tartrate 25 milliGRAM(s) Oral every 8 hours  sevelamer carbonate 800 milliGRAM(s) Oral three times a day with meals  sodium chloride 0.9% lock flush 3 milliLiter(s) IV Push every 8 hours  tiotropium 18 MICROgram(s) Capsule 1 Capsule(s) Inhalation daily  warfarin 3 milliGRAM(s) Oral once    MEDICATIONS  (PRN):  acetaminophen   Tablet .. 650 milliGRAM(s) Oral every 6 hours PRN Mild Pain (1 - 3)  dextrose 40% Gel 15 Gram(s) Oral once PRN Blood Glucose LESS THAN 70 milliGRAM(s)/deciLiter  glucagon  Injectable 1 milliGRAM(s) IntraMuscular once PRN Glucose <70 milliGRAM(s)/deciLiter      LABS:                            8.9    16.7  )-----------( 270      ( 30 Sep 2019 02:26 )             27.4     Hemoglobin: 8.9 g/dL (09-30 @ 02:26)  Hemoglobin: 9.0 g/dL (09-29 @ 09:54)  Hemoglobin: 8.9 g/dL (09-28 @ 02:26)  Hemoglobin: 9.2 g/dL (09-27 @ 00:54)  Hemoglobin: 9.1 g/dL (09-26 @ 00:35)    09-30    129<L>  |  90<L>  |  60<H>  ----------------------------<  133<H>  4.4   |  22  |  7.54<H>    Ca    8.7      30 Sep 2019 02:26    TPro  6.1  /  Alb  2.7<L>  /  TBili  0.3  /  DBili  x   /  AST  17  /  ALT  18  /  AlkPhos  64  09-30    Creatinine Trend: 7.54<--, 6.09<--, 6.08<--, 7.36<--, 5.36<--, 6.23<--   PT/INR - ( 30 Sep 2019 02:26 )   PT: 22.4 sec;   INR: 1.93 ratio         PTT - ( 30 Sep 2019 02:26 )  PTT:61.4 sec      09-29-19 @ 07:01  -  09-30-19 @ 07:00  --------------------------------------------------------  IN: 527 mL / OUT: 0 mL / NET: 527 mL    09-30-19 @ 07:01  -  09-30-19 @ 14:15  --------------------------------------------------------  IN: 480 mL / OUT: 0 mL / NET: 480 mL        PHYSICAL EXAM  Vital Signs Last 24 Hrs  T(C): 36.4 (30 Sep 2019 05:39), Max: 37.1 (29 Sep 2019 19:52)  T(F): 97.5 (30 Sep 2019 05:39), Max: 98.7 (29 Sep 2019 19:52)  HR: 60 (30 Sep 2019 05:39) (60 - 89)  BP: 149/80 (30 Sep 2019 05:39) (125/64 - 149/80)  BP(mean): --  RR: 18 (30 Sep 2019 13:09) (18 - 18)  SpO2: 95% (30 Sep 2019 13:09) (83% - 97%)      HEENT:  (-)icterus (-)pallor  CV: N S1 S2 1/6 DEJAH (+)2 Pulses B/l  Resp:  Coarse BS B/L, normal effort  GI: (+) BS Soft, NT, ND  Lymph:  (-)Edema, (-)obvious lymphadenopathy  Skin: Warm to touch, Normal turgor  Psych: Appropriate mood and affect    TELEMETRY: SB/SR 50-60s      0  < end of copied text >    ASSESSMENT/PLAN: 68 y/o male with PMHx of CAD, HTN, HLD, T2DM, and ESRD on HD presented after abnormal stress test to [facility] for elective cardiac catheterization. Patient admitted post cath after cath revealed severe ostial LM disease. Now transferred to [facility] for CABG. S/p C3L on 9/23. Patient developed new onset Afib on 9/25.  	   - 4-6 weeks of coumadin and Amio  - Continue ASA, Statin  - On Amio load for Afib  - Pain control  - HD per renal  - EP f/u appreciated  - Supportive care per CTS    Murphy Colin MD, [facility]  BEEPER [phone]

## 2019-09-30 NOTE — DISCHARGE NOTE PROVIDER - NSDCHHNEEDSERVICE_GEN_ALL_CORE
Medication teaching and assessment/Observation and assessment/Wound care and assessment/Other, specify...

## 2019-09-30 NOTE — PROGRESS NOTE ADULT - SUBJECTIVE AND OBJECTIVE BOX
Chief complaint  Patient is a 69y old  Male who presents with a chief complaint of sp    C3L (30 Sep 2019 13:16)   Review of systems  Patient in bed, looks comfortable, no fever, no hypoglycemia.    Labs and Fingersticks  CAPILLARY BLOOD GLUCOSE      POCT Blood Glucose.: 96 mg/dL (30 Sep 2019 12:40)  POCT Blood Glucose.: 79 mg/dL (30 Sep 2019 08:02)  POCT Blood Glucose.: 112 mg/dL (30 Sep 2019 00:51)  POCT Blood Glucose.: 167 mg/dL (29 Sep 2019 21:38)  POCT Blood Glucose.: 173 mg/dL (29 Sep 2019 17:55)      Anion Gap, Serum: 17 (09-30 @ 02:26)  Anion Gap, Serum: 14 (09-29 @ 09:54)      Calcium, Total Serum: 8.7 (09-30 @ 02:26)  Calcium, Total Serum: 8.9 (09-29 @ 09:54)  Albumin, Serum: 2.7 <L> (09-30 @ 02:26)    Alanine Aminotransferase (ALT/SGPT): 18 (09-30 @ 02:26)  Alkaline Phosphatase, Serum: 64 (09-30 @ 02:26)  Aspartate Aminotransferase (AST/SGOT): 17 (09-30 @ 02:26)        09-30    129<L>  |  90<L>  |  60<H>  ----------------------------<  133<H>  4.4   |  22  |  7.54<H>    Ca    8.7      30 Sep 2019 02:26    TPro  6.1  /  Alb  2.7<L>  /  TBili  0.3  /  DBili  x   /  AST  17  /  ALT  18  /  AlkPhos  64  09-30                        8.9    16.7  )-----------( 270      ( 30 Sep 2019 02:26 )             27.4     Medications  MEDICATIONS  (STANDING):  ALBUTerol/ipratropium for Nebulization 3 milliLiter(s) Nebulizer every 6 hours  amiodarone    Tablet 200 milliGRAM(s) Oral daily  amiodarone    Tablet   Oral   aspirin enteric coated 81 milliGRAM(s) Oral daily  atorvastatin 40 milliGRAM(s) Oral at bedtime  dextrose 5%. 1000 milliLiter(s) (50 mL/Hr) IV Continuous <Continuous>  dextrose 50% Injectable 12.5 Gram(s) IV Push once  dextrose 50% Injectable 25 Gram(s) IV Push once  dextrose 50% Injectable 25 Gram(s) IV Push once  docusate sodium 100 milliGRAM(s) Oral three times a day  famotidine    Tablet 20 milliGRAM(s) Oral at bedtime  heparin  Infusion 700 Unit(s)/Hr (13 mL/Hr) IV Continuous <Continuous>  insulin glargine Injectable (LANTUS) 14 Unit(s) SubCutaneous at bedtime  insulin lispro (HumaLOG) corrective regimen sliding scale   SubCutaneous three times a day before meals  insulin lispro (HumaLOG) corrective regimen sliding scale   SubCutaneous at bedtime  ketotifen 0.025% Ophthalmic Solution 1 Drop(s) Both EYES two times a day  methimazole 5 milliGRAM(s) Oral daily  metoprolol tartrate 25 milliGRAM(s) Oral every 8 hours  sevelamer carbonate 800 milliGRAM(s) Oral three times a day with meals  sodium chloride 0.9% lock flush 3 milliLiter(s) IV Push every 8 hours  tiotropium 18 MICROgram(s) Capsule 1 Capsule(s) Inhalation daily  warfarin 3 milliGRAM(s) Oral once      Physical Exam  General: Patient comfortable in bed  Vital Signs Last 12 Hrs  T(F): 98.1 (09-30-19 @ 14:23), Max: 98.1 (09-30-19 @ 14:23)  HR: 63 (09-30-19 @ 14:23) (60 - 63)  BP: 124/67 (09-30-19 @ 14:23) (124/67 - 149/80)  BP(mean): --  RR: 18 (09-30-19 @ 14:23) (18 - 18)  SpO2: 96% (09-30-19 @ 14:23) (83% - 96%)  Neck: No palpable thyroid nodules.  CVS: S1S2, No murmurs  Respiratory: No wheezing, no crepitations  GI: Abdomen soft, bowel sounds positive  Musculoskeletal:  edema lower extremities.   Skin: No skin rashes, no ecchymosis    Diagnostics    Thyroid Stimulating Hormone, Serum: AM Sched. Collection: 23-Sep-2019 06:00 (09-22 @ 17:06)  Free Thyroxine, Serum: AM Sched. Collection: 23-Sep-2019 06:00 (09-22 @ 17:06)

## 2019-09-30 NOTE — PROGRESS NOTE ADULT - ASSESSMENT
69 yr old male   sp   C3l   H  ESRD   CoRS stents,   hyperthyroid,  DM   CVA, MI  preop   endo cslt   Hyperthyroid and hyperglycemia  post op afib   - amio load  9/28   coumadin dosing ,  afib  110   bp stable   amio load  and [id number],  confused-chair alarm   dc percocet  9/29 VSS - LLL diminished - chest pt given - spirometry encouraged oob - o2 weened off - will ck o2 sat. INR 1.53 5mg Coumadin given  d/c planning home Tues?  9/30    VSS    INR    1.93   coumadin 3 mg,  02 sats  86 percent room air  + rhonchi  on nebs,   forgertful  no narcotics  wbc  16   afebrile    urinalysis

## 2019-09-30 NOTE — PROGRESS NOTE ADULT - SUBJECTIVE AND OBJECTIVE BOX
[facility] - NII Knight / NII Mendiola / JASWANT Arroyo/ NII Mackey/ NII Macias/ BRIGITTE Esposito / ARAMIS Calle / LUDA Bowen  ---------------------------------------------------------------------------------------------------------------  seen and examined today for ESRD on HD  Interval : NAD  VITALS:  T(F): 97.5 (09-30-19 @ 05:39), Max: 98.7 (09-29-19 @ 19:52)  HR: 60 (09-30-19 @ 05:39)  BP: 149/80 (09-30-19 @ 05:39)  RR: 18 (09-30-19 @ 05:39)  SpO2: 92% (09-30-19 @ 05:39)  Wt(kg): --    09-29 @ 07:01  -  09-30 @ 07:00  --------------------------------------------------------  IN: 527 mL / OUT: 0 mL / NET: 527 mL      Physical Exam :-  Constitutional: NAD  Neck: Supple.  Respiratory: Bilateral equal breath sounds,  Cardiovascular: S1, S2 normal,  Gastrointestinal: Bowel Sounds present, soft, non tender.  Extremities: No edema  Neurological: Alert and Oriented x 3, no focal deficits  Psychiatric: Normal mood, normal affect  Data:-  Allergies :   lisinopril (Other)    Hospital Medications:   MEDICATIONS  (STANDING):  amiodarone    Tablet 200 milliGRAM(s) Oral daily  amiodarone    Tablet   Oral   aspirin enteric coated 81 milliGRAM(s) Oral daily  atorvastatin 40 milliGRAM(s) Oral at bedtime  dextrose 5%. 1000 milliLiter(s) (50 mL/Hr) IV Continuous <Continuous>  dextrose 50% Injectable 12.5 Gram(s) IV Push once  dextrose 50% Injectable 25 Gram(s) IV Push once  dextrose 50% Injectable 25 Gram(s) IV Push once  docusate sodium 100 milliGRAM(s) Oral three times a day  famotidine    Tablet 20 milliGRAM(s) Oral at bedtime  heparin  Infusion 700 Unit(s)/Hr (13 mL/Hr) IV Continuous <Continuous>  insulin glargine Injectable (LANTUS) 14 Unit(s) SubCutaneous at bedtime  insulin lispro (HumaLOG) corrective regimen sliding scale   SubCutaneous three times a day before meals  insulin lispro (HumaLOG) corrective regimen sliding scale   SubCutaneous at bedtime  insulin lispro Injectable (HumaLOG) 6 Unit(s) SubCutaneous three times a day before meals  ketotifen 0.025% Ophthalmic Solution 1 Drop(s) Both EYES two times a day  methimazole 5 milliGRAM(s) Oral daily  metoprolol tartrate 25 milliGRAM(s) Oral every 8 hours  sevelamer carbonate 800 milliGRAM(s) Oral three times a day with meals  sodium chloride 0.9% lock flush 3 milliLiter(s) IV Push every 8 hours  tiotropium 18 MICROgram(s) Capsule 1 Capsule(s) Inhalation daily    09-30    129<L>  |  90<L>  |  60<H>  ----------------------------<  133<H>  4.4   |  22  |  7.54<H>    Ca    8.7      30 Sep 2019 02:26    TPro  6.1  /  Alb  2.7<L>  /  TBili  0.3  /  DBili      /  AST  17  /  ALT  18  /  AlkPhos  64  09-30    Creatinine Trend: 7.54 <--, 6.09 <--, 6.08 <--, 7.36 <--, 5.36 <--, 6.23 <--, 5.44 <--, 4.38 <--                        8.9    16.7  )-----------( 270      ( 30 Sep 2019 02:26 )             27.4

## 2019-10-01 LAB
ALBUMIN SERPL ELPH-MCNC: 3 G/DL — LOW (ref 3.3–5)
ALBUMIN SERPL ELPH-MCNC: 3.2 G/DL — LOW (ref 3.3–5)
ALP SERPL-CCNC: 65 U/L — SIGNIFICANT CHANGE UP (ref 40–120)
ALP SERPL-CCNC: 74 U/L — SIGNIFICANT CHANGE UP (ref 40–120)
ALT FLD-CCNC: 19 U/L — SIGNIFICANT CHANGE UP (ref 10–45)
ALT FLD-CCNC: 23 U/L — SIGNIFICANT CHANGE UP (ref 10–45)
ANION GAP SERPL CALC-SCNC: 20 MMOL/L — HIGH (ref 5–17)
APTT BLD: 34.4 SEC — SIGNIFICANT CHANGE UP (ref 27.5–36.3)
APTT BLD: 34.5 SEC — SIGNIFICANT CHANGE UP (ref 27.5–36.3)
AST SERPL-CCNC: 19 U/L — SIGNIFICANT CHANGE UP (ref 10–40)
AST SERPL-CCNC: 19 U/L — SIGNIFICANT CHANGE UP (ref 10–40)
BILIRUB DIRECT SERPL-MCNC: 0.1 MG/DL — SIGNIFICANT CHANGE UP (ref 0–0.2)
BILIRUB INDIRECT FLD-MCNC: 0.2 MG/DL — SIGNIFICANT CHANGE UP (ref 0.2–1)
BILIRUB SERPL-MCNC: 0.3 MG/DL — SIGNIFICANT CHANGE UP (ref 0.2–1.2)
BILIRUB SERPL-MCNC: 0.3 MG/DL — SIGNIFICANT CHANGE UP (ref 0.2–1.2)
BUN SERPL-MCNC: 79 MG/DL — HIGH (ref 7–23)
CALCIUM SERPL-MCNC: 8.8 MG/DL — SIGNIFICANT CHANGE UP (ref 8.4–10.5)
CHLORIDE SERPL-SCNC: 84 MMOL/L — LOW (ref 96–108)
CO2 SERPL-SCNC: 20 MMOL/L — LOW (ref 22–31)
CREAT SERPL-MCNC: 9.14 MG/DL — HIGH (ref 0.5–1.3)
GLUCOSE BLDC GLUCOMTR-MCNC: 109 MG/DL — HIGH (ref 70–99)
GLUCOSE BLDC GLUCOMTR-MCNC: 118 MG/DL — HIGH (ref 70–99)
GLUCOSE BLDC GLUCOMTR-MCNC: 162 MG/DL — HIGH (ref 70–99)
GLUCOSE BLDC GLUCOMTR-MCNC: 87 MG/DL — SIGNIFICANT CHANGE UP (ref 70–99)
GLUCOSE BLDC GLUCOMTR-MCNC: 93 MG/DL — SIGNIFICANT CHANGE UP (ref 70–99)
GLUCOSE SERPL-MCNC: 96 MG/DL — SIGNIFICANT CHANGE UP (ref 70–99)
HCT VFR BLD CALC: 29.3 % — LOW (ref 39–50)
HGB BLD-MCNC: 9.8 G/DL — LOW (ref 13–17)
INR BLD: 2.3 RATIO — HIGH (ref 0.88–1.16)
INR BLD: 2.6 RATIO — HIGH (ref 0.88–1.16)
MCHC RBC-ENTMCNC: 29.8 PG — SIGNIFICANT CHANGE UP (ref 27–34)
MCHC RBC-ENTMCNC: 33.3 GM/DL — SIGNIFICANT CHANGE UP (ref 32–36)
MCV RBC AUTO: 89.6 FL — SIGNIFICANT CHANGE UP (ref 80–100)
PLATELET # BLD AUTO: 219 K/UL — SIGNIFICANT CHANGE UP (ref 150–400)
POTASSIUM SERPL-MCNC: 5 MMOL/L — SIGNIFICANT CHANGE UP (ref 3.5–5.3)
POTASSIUM SERPL-SCNC: 5 MMOL/L — SIGNIFICANT CHANGE UP (ref 3.5–5.3)
PROT SERPL-MCNC: 5.9 G/DL — LOW (ref 6–8.3)
PROT SERPL-MCNC: 6.8 G/DL — SIGNIFICANT CHANGE UP (ref 6–8.3)
PROTHROM AB SERPL-ACNC: 26.9 SEC — HIGH (ref 10–12.9)
PROTHROM AB SERPL-ACNC: 30.8 SEC — HIGH (ref 10–12.9)
RBC # BLD: 3.27 M/UL — LOW (ref 4.2–5.8)
RBC # FLD: 12.7 % — SIGNIFICANT CHANGE UP (ref 10.3–14.5)
SODIUM SERPL-SCNC: 124 MMOL/L — LOW (ref 135–145)
WBC # BLD: 15.2 K/UL — HIGH (ref 3.8–10.5)
WBC # FLD AUTO: 15.2 K/UL — HIGH (ref 3.8–10.5)

## 2019-10-01 PROCEDURE — 71045 X-RAY EXAM CHEST 1 VIEW: CPT | Mod: 26,77

## 2019-10-01 PROCEDURE — 93010 ELECTROCARDIOGRAM REPORT: CPT

## 2019-10-01 PROCEDURE — 71045 X-RAY EXAM CHEST 1 VIEW: CPT | Mod: 26

## 2019-10-01 RX ORDER — SODIUM CHLORIDE 9 MG/ML
1 INJECTION INTRAMUSCULAR; INTRAVENOUS; SUBCUTANEOUS
Refills: 0 | Status: DISCONTINUED | OUTPATIENT
Start: 2019-10-01 | End: 2019-10-02

## 2019-10-01 RX ORDER — HYDRALAZINE HCL 50 MG
2.5 TABLET ORAL ONCE
Refills: 0 | Status: COMPLETED | OUTPATIENT
Start: 2019-10-01 | End: 2019-10-01

## 2019-10-01 RX ORDER — METOPROLOL TARTRATE 50 MG
50 TABLET ORAL
Refills: 0 | Status: DISCONTINUED | OUTPATIENT
Start: 2019-10-01 | End: 2019-10-03

## 2019-10-01 RX ORDER — HYDRALAZINE HCL 50 MG
5 TABLET ORAL ONCE
Refills: 0 | Status: COMPLETED | OUTPATIENT
Start: 2019-10-01 | End: 2019-10-01

## 2019-10-01 RX ADMIN — SODIUM CHLORIDE 3 MILLILITER(S): 9 INJECTION INTRAMUSCULAR; INTRAVENOUS; SUBCUTANEOUS at 15:05

## 2019-10-01 RX ADMIN — SODIUM CHLORIDE 3 MILLILITER(S): 9 INJECTION INTRAMUSCULAR; INTRAVENOUS; SUBCUTANEOUS at 05:05

## 2019-10-01 RX ADMIN — Medication 3 MILLILITER(S): at 05:06

## 2019-10-01 RX ADMIN — FAMOTIDINE 20 MILLIGRAM(S): 10 INJECTION INTRAVENOUS at 22:46

## 2019-10-01 RX ADMIN — Medication 3 MILLILITER(S): at 13:55

## 2019-10-01 RX ADMIN — Medication 3 MILLILITER(S): at 18:27

## 2019-10-01 RX ADMIN — KETOTIFEN FUMARATE 1 DROP(S): 0.34 SOLUTION OPHTHALMIC at 18:27

## 2019-10-01 RX ADMIN — Medication 5 MILLIGRAM(S): at 05:22

## 2019-10-01 RX ADMIN — Medication 2.5 MILLIGRAM(S): at 04:42

## 2019-10-01 RX ADMIN — Medication 650 MILLIGRAM(S): at 17:30

## 2019-10-01 RX ADMIN — Medication 25 MILLIGRAM(S): at 03:08

## 2019-10-01 RX ADMIN — Medication 650 MILLIGRAM(S): at 16:49

## 2019-10-01 RX ADMIN — AMIODARONE HYDROCHLORIDE 200 MILLIGRAM(S): 400 TABLET ORAL at 03:08

## 2019-10-01 RX ADMIN — Medication 50 MILLIGRAM(S): at 18:28

## 2019-10-01 RX ADMIN — SEVELAMER CARBONATE 800 MILLIGRAM(S): 2400 POWDER, FOR SUSPENSION ORAL at 13:58

## 2019-10-01 RX ADMIN — Medication 100 MILLIGRAM(S): at 13:58

## 2019-10-01 RX ADMIN — ATORVASTATIN CALCIUM 40 MILLIGRAM(S): 80 TABLET, FILM COATED ORAL at 22:46

## 2019-10-01 RX ADMIN — SEVELAMER CARBONATE 800 MILLIGRAM(S): 2400 POWDER, FOR SUSPENSION ORAL at 18:29

## 2019-10-01 RX ADMIN — SODIUM CHLORIDE 3 MILLILITER(S): 9 INJECTION INTRAMUSCULAR; INTRAVENOUS; SUBCUTANEOUS at 22:20

## 2019-10-01 RX ADMIN — Medication 100 MILLIGRAM(S): at 05:06

## 2019-10-01 RX ADMIN — Medication 81 MILLIGRAM(S): at 13:56

## 2019-10-01 RX ADMIN — SODIUM CHLORIDE 1 GRAM(S): 9 INJECTION INTRAMUSCULAR; INTRAVENOUS; SUBCUTANEOUS at 18:29

## 2019-10-01 RX ADMIN — INSULIN GLARGINE 14 UNIT(S): 100 INJECTION, SOLUTION SUBCUTANEOUS at 22:46

## 2019-10-01 RX ADMIN — Medication 100 MILLIGRAM(S): at 22:46

## 2019-10-01 RX ADMIN — Medication 3 MILLILITER(S): at 22:46

## 2019-10-01 RX ADMIN — KETOTIFEN FUMARATE 1 DROP(S): 0.34 SOLUTION OPHTHALMIC at 05:06

## 2019-10-01 NOTE — PROGRESS NOTE ADULT - ASSESSMENT
69 y.o. male with pmh of ESRD( HD on tu/Th/sa) at [facility] via LUE AVF. Last dialyzed yesterday 9/14, was sent to [facility] for cath after presenting with SOB and a positive stress tests/p CABG 9/23/19 with new onset afib ;Renal following for ESRD Mx.    labs, chart reviewed

## 2019-10-01 NOTE — PROGRESS NOTE ADULT - SUBJECTIVE AND OBJECTIVE BOX
VITAL SIGNS    Telemetry:     sr  60-70    Vital Signs Last 24 Hrs  T(C): 36.7 (19 @ 23:54), Max: 36.7 (19 @ 14:23)  T(F): 98 (19 @ 23:54), Max: 98.1 (19 @ 14:23)  HR: 70 (10-01-19 @ 06:17) (62 - 75)  BP: 136/78 (10-01-19 @ 06:17) (124/67 - 212/83)  RR: 18 (10-01-19 @ 06:17) (18 - 20)  SpO2: 99% (10-01-19 @ 06:17) (83% - 100%)                   Daily     Daily Weight in k.4 (01 Oct 2019 08:30)      Bilirubin Total, Serum: 0.3 mg/dL (10-01 @ 04:24)    CAPILLARY BLOOD GLUCOSE      POCT Blood Glucose.: 87 mg/dL (01 Oct 2019 07:48)  POCT Blood Glucose.: 109 mg/dL (01 Oct 2019 03:10)  POCT Blood Glucose.: 112 mg/dL (30 Sep 2019 21:55)  POCT Blood Glucose.: 74 mg/dL (30 Sep 2019 17:23)  POCT Blood Glucose.: 96 mg/dL (30 Sep 2019 12:40    Pacing Wires                          Isolated                         PHYSICAL EXAM    Neurology: alert and oriented x 3, moves all extremities with no defecits  forgetful at times  CV :  RRR  Sternal Wound :  CDI , Stable  Lungs:  bl  diminshed throughout  Abdomen: soft, nontender, nondistended, positive bowel sounds,  Extremities:       trace to plus one pedal edema

## 2019-10-01 NOTE — PROGRESS NOTE ADULT - SUBJECTIVE AND OBJECTIVE BOX
EP ATTENDING    tele: NSR, no events    no palpitations, no syncope, no angina, ROS otherwise -    acetaminophen   Tablet .. 650 milliGRAM(s) Oral every 6 hours PRN  ALBUTerol/ipratropium for Nebulization 3 milliLiter(s) Nebulizer every 6 hours  amiodarone    Tablet 200 milliGRAM(s) Oral daily  amiodarone    Tablet   Oral   aspirin enteric coated 81 milliGRAM(s) Oral daily  atorvastatin 40 milliGRAM(s) Oral at bedtime  dextrose 40% Gel 15 Gram(s) Oral once PRN  dextrose 5%. 1000 milliLiter(s) IV Continuous <Continuous>  dextrose 50% Injectable 12.5 Gram(s) IV Push once  dextrose 50% Injectable 25 Gram(s) IV Push once  dextrose 50% Injectable 25 Gram(s) IV Push once  docusate sodium 100 milliGRAM(s) Oral three times a day  famotidine    Tablet 20 milliGRAM(s) Oral at bedtime  glucagon  Injectable 1 milliGRAM(s) IntraMuscular once PRN  insulin glargine Injectable (LANTUS) 14 Unit(s) SubCutaneous at bedtime  insulin lispro (HumaLOG) corrective regimen sliding scale   SubCutaneous three times a day before meals  insulin lispro (HumaLOG) corrective regimen sliding scale   SubCutaneous at bedtime  ketotifen 0.025% Ophthalmic Solution 1 Drop(s) Both EYES two times a day  methimazole 5 milliGRAM(s) Oral daily  metoprolol tartrate 50 milliGRAM(s) Oral two times a day  sevelamer carbonate 800 milliGRAM(s) Oral three times a day with meals  sodium chloride 0.9% lock flush 3 milliLiter(s) IV Push every 8 hours  tiotropium 18 MICROgram(s) Capsule 1 Capsule(s) Inhalation daily                            9.8    15.2  )-----------( 219      ( 01 Oct 2019 04:24 )             29.3       10-01    124<L>  |  84<L>  |  79<H>  ----------------------------<  96  5.0   |  20<L>  |  9.14<H>    Ca    8.8      01 Oct 2019 04:24    TPro  6.8  /  Alb  3.2<L>  /  TBili  0.3  /  DBili  x   /  AST  19  /  ALT  23  /  AlkPhos  74  10-01            T(C): 36.7 (09-30-19 @ 23:54), Max: 36.7 (09-30-19 @ 14:23)  HR: 70 (10-01-19 @ 06:17) (62 - 75)  BP: 136/78 (10-01-19 @ 06:17) (124/67 - 212/83)  RR: 18 (10-01-19 @ 06:17) (18 - 20)  SpO2: 99% (10-01-19 @ 06:17) (83% - 100%)  Wt(kg): --    I&O's Summary    30 Sep 2019 07:01  -  01 Oct 2019 07:00  --------------------------------------------------------  IN: 831 mL / OUT: 0 mL / NET: 831 mL    01 Oct 2019 07:01  -  01 Oct 2019 10:38  --------------------------------------------------------  IN: 200 mL / OUT: 0 mL / NET: 200 mL    Appearance: Normal	  HEENT:   Normal oral mucosa, PERRL, EOMI	  Lymphatic: No lymphadenopathy , no edema  RRR, no murmurs  Respiratory: Lungs clear to auscultation, normal effort 	  Gastrointestinal:  Soft, Non-tender, + BS	  Skin: No rashes, No ecchymoses, No cyanosis, warm to touch  Musculoskeletal: Normal range of motion, normal strength  Psychiatry:  Mood & affect appropriate                A/P) He is a pleasant 70 y/o male PMH ESRD on HD and CAD s/p multiple PCIs from 1261-3119. He now is s/p a CABG, and had post-op AF. Review of all EKGs demonstrates no evidence of pre-existing AF. His LVEF remains normal. He denies palpitation, syncope, nor angina.    -agree with 4-6 weeks of coumadin/amio  -shouldn't need a/c long term as AF was only post-op  -no further inpatient EP workup needed as long as he remains in NSR      Alison Archuleta M.D., [facility]  Cardiac Electrophysiology  [facility]  [address] [place] [address], [address] [place] [address]  [email or web address]    office [phone]  pager [phone]

## 2019-10-01 NOTE — PROGRESS NOTE ADULT - PROBLEM SELECTOR PLAN 1
Tolerated HD today, plan for next HD Thursday  Noted LE edema, coarse breath sounds, and hyponatremia, plan for increase UF next HD session, starting salt tabs BID, unrestricted sodium diet  trend bmp

## 2019-10-01 NOTE — PROGRESS NOTE ADULT - SUBJECTIVE AND OBJECTIVE BOX
Patient is a 69y old  Male who presents with a chief complaint of sp   C3L (01 Oct 2019 09:19)      SUBJECTIVE / OVERNIGHT EVENTS: overnight events noted    ROS:  Resp: No cough no sputum production  CVS: No chest pain no palpitations no orthopnea  GI: no N/V/D  : no dysuria, no hematuria  Neuro: no weakness no paresthesias  Heme: No petechiae no easy bruising  Msk: No joint pain no swelling  Skin: No rash no itching        MEDICATIONS  (STANDING):  ALBUTerol/ipratropium for Nebulization 3 milliLiter(s) Nebulizer every 6 hours  amiodarone    Tablet 200 milliGRAM(s) Oral daily  amiodarone    Tablet   Oral   aspirin enteric coated 81 milliGRAM(s) Oral daily  atorvastatin 40 milliGRAM(s) Oral at bedtime  dextrose 5%. 1000 milliLiter(s) (50 mL/Hr) IV Continuous <Continuous>  dextrose 50% Injectable 12.5 Gram(s) IV Push once  dextrose 50% Injectable 25 Gram(s) IV Push once  dextrose 50% Injectable 25 Gram(s) IV Push once  docusate sodium 100 milliGRAM(s) Oral three times a day  famotidine    Tablet 20 milliGRAM(s) Oral at bedtime  insulin glargine Injectable (LANTUS) 14 Unit(s) SubCutaneous at bedtime  insulin lispro (HumaLOG) corrective regimen sliding scale   SubCutaneous three times a day before meals  insulin lispro (HumaLOG) corrective regimen sliding scale   SubCutaneous at bedtime  ketotifen 0.025% Ophthalmic Solution 1 Drop(s) Both EYES two times a day  methimazole 5 milliGRAM(s) Oral daily  metoprolol tartrate 50 milliGRAM(s) Oral two times a day  sevelamer carbonate 800 milliGRAM(s) Oral three times a day with meals  sodium chloride 1 Gram(s) Oral two times a day  sodium chloride 0.9% lock flush 3 milliLiter(s) IV Push every 8 hours  tiotropium 18 MICROgram(s) Capsule 1 Capsule(s) Inhalation daily    MEDICATIONS  (PRN):  acetaminophen   Tablet .. 650 milliGRAM(s) Oral every 6 hours PRN Mild Pain (1 - 3)  dextrose 40% Gel 15 Gram(s) Oral once PRN Blood Glucose LESS THAN 70 milliGRAM(s)/deciLiter  glucagon  Injectable 1 milliGRAM(s) IntraMuscular once PRN Glucose <70 milliGRAM(s)/deciLiter        CAPILLARY BLOOD GLUCOSE      POCT Blood Glucose.: 93 mg/dL (01 Oct 2019 12:23)  POCT Blood Glucose.: 87 mg/dL (01 Oct 2019 07:48)  POCT Blood Glucose.: 109 mg/dL (01 Oct 2019 03:10)  POCT Blood Glucose.: 112 mg/dL (30 Sep 2019 21:55)  POCT Blood Glucose.: 74 mg/dL (30 Sep 2019 17:23)    I&O's Summary    30 Sep 2019 07:01  -  01 Oct 2019 07:00  --------------------------------------------------------  IN: 831 mL / OUT: 0 mL / NET: 831 mL    01 Oct 2019 07:01  -  01 Oct 2019 16:13  --------------------------------------------------------  IN: 700 mL / OUT: 2000 mL / NET: -1300 mL        Vital Signs Last 24 Hrs  T(C): 36.8 (01 Oct 2019 13:15), Max: 36.8 (01 Oct 2019 13:15)  T(F): 98.2 (01 Oct 2019 13:15), Max: 98.2 (01 Oct 2019 13:15)  HR: 112 (01 Oct 2019 13:15) (62 - 112)  BP: 115/73 (01 Oct 2019 13:15) (103/76 - 212/83)  BP(mean): --  RR: 22 (01 Oct 2019 13:15) (18 - 22)  SpO2: 100% (01 Oct 2019 13:15) (95% - 100%)    PHYSICAL EXAM:  GENERAL: NAD, well-developed  HEAD:  Atraumatic, Normocephalic  EYES: EOMI, PERRLA, conjunctiva and sclera clear  NECK: Supple, No JVD  CHEST/LUNG: Clear to auscultation bilaterally; decreased breath sounds at bases   HEART: S1S2; soft ejection systolic murmur best heard at left sternal border no rub no gallop   ABDOMEN: Soft, Nontender, Nondistended; Bowel sounds present  EXTREMITIES:  + Peripheral Pulses, No clubbing or cyanosis, 1 + edema  PSYCH: AO x 3,   NEUROLOGY: Alert, no focal motor or sensory deficits  SKIN: No rashes or lesions    LABS:                        9.8    15.2  )-----------( 219      ( 01 Oct 2019 04:24 )             29.3     10-01    124<L>  |  84<L>  |  79<H>  ----------------------------<  96  5.0   |  20<L>  |  9.14<H>    Ca    8.8      01 Oct 2019 04:24    TPro  5.9<L>  /  Alb  3.0<L>  /  TBili  0.3  /  DBili  0.1  /  AST  19  /  ALT  19  /  AlkPhos  65  10-01    PT/INR - ( 01 Oct 2019 04:24 )   PT: 30.8 sec;   INR: 2.60 ratio         PTT - ( 01 Oct 2019 04:24 )  PTT:34.5 sec            All consultant(s) notes reviewed and care discussed with other providers        Contact Number, Dr Cagle [phone]

## 2019-10-01 NOTE — CHART NOTE - NSCHARTNOTEFT_GEN_A_CORE
OOB without assistance  IV dislodged  diaphoretic hypertensive  fingerstick  cxr  cmp cbc  pt ptt inr  12 lead    enhanced supervision

## 2019-10-01 NOTE — PROGRESS NOTE ADULT - ASSESSMENT
69 yr old male   sp   C3l   H  ESRD   CoRS stents,   hyperthyroid,  DM   CVA, MI  preop   endo cslt   Hyperthyroid and hyperglycemia  post op afib   - amio load  9/28   coumadin dosing ,  afib  110   bp stable   amio load  and [id number],  confused-chair alarm   dc percocet  9/29 VSS - LLL diminished - chest pt given - spirometry encouraged oob - o2 weened off - will ck o2 sat. INR 1.53 5mg Coumadin given  d/c planning home Tues?  9/30    VSS    INR    1.93   coumadin 3 mg,  02 sats  86 percent room air  + rhonchi  on nebs,   forgertful  no narcotics  wbc  16   afebrile    urinalysis     10/1   chest xray  atelectasis vs pl effusion B/L,   on bipap this am  now tolerating 3 Liters NC,    OOB to chair,   HD this am,  now in NSR

## 2019-10-01 NOTE — PROGRESS NOTE ADULT - SUBJECTIVE AND OBJECTIVE BOX
Chief complaint  Patient is a 69y old  Male who presents with a chief complaint of sp   C3L (01 Oct 2019 09:19)   Review of systems  Patient in bed, looks comfortable, no fever, no hypoglycemia.    Labs and Fingersticks  CAPILLARY BLOOD GLUCOSE      POCT Blood Glucose.: 87 mg/dL (01 Oct 2019 07:48)  POCT Blood Glucose.: 109 mg/dL (01 Oct 2019 03:10)  POCT Blood Glucose.: 112 mg/dL (30 Sep 2019 21:55)  POCT Blood Glucose.: 74 mg/dL (30 Sep 2019 17:23)  POCT Blood Glucose.: 96 mg/dL (30 Sep 2019 12:40)      Anion Gap, Serum: 20 <H> (10-01 @ 04:24)  Anion Gap, Serum: 17 (09-30 @ 02:26)      Calcium, Total Serum: 8.8 (10-01 @ 04:24)  Calcium, Total Serum: 8.7 (09-30 @ 02:26)  Albumin, Serum: 3.2 <L> (10-01 @ 04:24)  Albumin, Serum: 2.7 <L> (09-30 @ 02:26)    Alanine Aminotransferase (ALT/SGPT): 23 (10-01 @ 04:24)  Alanine Aminotransferase (ALT/SGPT): 18 (09-30 @ 02:26)  Alkaline Phosphatase, Serum: 74 (10-01 @ 04:24)  Alkaline Phosphatase, Serum: 64 (09-30 @ 02:26)  Aspartate Aminotransferase (AST/SGOT): 19 (10-01 @ 04:24)  Aspartate Aminotransferase (AST/SGOT): 17 (09-30 @ 02:26)        10-01    124<L>  |  84<L>  |  79<H>  ----------------------------<  96  5.0   |  20<L>  |  9.14<H>    Ca    8.8      01 Oct 2019 04:24    TPro  6.8  /  Alb  3.2<L>  /  TBili  0.3  /  DBili  x   /  AST  19  /  ALT  23  /  AlkPhos  74  10-01                        9.8    15.2  )-----------( 219      ( 01 Oct 2019 04:24 )             29.3     Medications  MEDICATIONS  (STANDING):  ALBUTerol/ipratropium for Nebulization 3 milliLiter(s) Nebulizer every 6 hours  amiodarone    Tablet 200 milliGRAM(s) Oral daily  amiodarone    Tablet   Oral   aspirin enteric coated 81 milliGRAM(s) Oral daily  atorvastatin 40 milliGRAM(s) Oral at bedtime  dextrose 5%. 1000 milliLiter(s) (50 mL/Hr) IV Continuous <Continuous>  dextrose 50% Injectable 12.5 Gram(s) IV Push once  dextrose 50% Injectable 25 Gram(s) IV Push once  dextrose 50% Injectable 25 Gram(s) IV Push once  docusate sodium 100 milliGRAM(s) Oral three times a day  famotidine    Tablet 20 milliGRAM(s) Oral at bedtime  insulin glargine Injectable (LANTUS) 14 Unit(s) SubCutaneous at bedtime  insulin lispro (HumaLOG) corrective regimen sliding scale   SubCutaneous three times a day before meals  insulin lispro (HumaLOG) corrective regimen sliding scale   SubCutaneous at bedtime  ketotifen 0.025% Ophthalmic Solution 1 Drop(s) Both EYES two times a day  methimazole 5 milliGRAM(s) Oral daily  metoprolol tartrate 50 milliGRAM(s) Oral two times a day  sevelamer carbonate 800 milliGRAM(s) Oral three times a day with meals  sodium chloride 0.9% lock flush 3 milliLiter(s) IV Push every 8 hours  tiotropium 18 MICROgram(s) Capsule 1 Capsule(s) Inhalation daily      Physical Exam  General: Patient comfortable in bed  Vital Signs Last 12 Hrs  T(F): 98 (09-30-19 @ 23:54), Max: 98 (09-30-19 @ 23:54)  HR: 70 (10-01-19 @ 06:17) (64 - 75)  BP: 136/78 (10-01-19 @ 06:17) (136/78 - 212/83)  BP(mean): --  RR: 18 (10-01-19 @ 06:17) (18 - 20)  SpO2: 99% (10-01-19 @ 06:17) (97% - 100%)  Neck: No palpable thyroid nodules.  CVS: S1S2, No murmurs  Respiratory: No wheezing, no crepitations  GI: Abdomen soft, bowel sounds positive  Musculoskeletal:  edema lower extremities.   Skin: No skin rashes, no ecchymosis    Diagnostics    Thyroid Stimulating Hormone, Serum: AM Sched. Collection: 23-Sep-2019 06:00 (09-22 @ 17:06)  Free Thyroxine, Serum: AM Sched. Collection: 23-Sep-2019 06:00 (09-22 @ 17:06)

## 2019-10-01 NOTE — CHART NOTE - NSCHARTNOTEFT_GEN_A_CORE
Nutrition Follow Up Note  Patient seen for: follow up    · Reason for Admission	sp   C3L  pt receiving HD at bedside at time of visit    PT recommending subacute rehab  Source: pt, chart    Diet : consistent carbohydrate with snack, no concentrated K+, no concentrated PO4    Patient reports: pt with limited ability to communicate during visit due to having a mask on his face. he was able to reply to questions requiring a (yes/no) response. he is eating well, refused need for Nepro shake at this time even though pt would benefit from increased protein.      PO intake : >75%     Source for PO intake: pt          Daily Weight in k.4 (10-01), Weight in k.2 (), Weight in k (), Weight in k.5 (), Weight in k.8 (), Weight in k.6 (), Weight in k.6 ()  % Weight Change: 3% gain since previous assess on     Pertinent Medications: MEDICATIONS  (STANDING):  ALBUTerol/ipratropium for Nebulization 3 milliLiter(s) Nebulizer every 6 hours  amiodarone    Tablet 200 milliGRAM(s) Oral daily  amiodarone    Tablet   Oral   aspirin enteric coated 81 milliGRAM(s) Oral daily  atorvastatin 40 milliGRAM(s) Oral at bedtime  dextrose 5%. 1000 milliLiter(s) (50 mL/Hr) IV Continuous <Continuous>  dextrose 50% Injectable 12.5 Gram(s) IV Push once  dextrose 50% Injectable 25 Gram(s) IV Push once  dextrose 50% Injectable 25 Gram(s) IV Push once  docusate sodium 100 milliGRAM(s) Oral three times a day  famotidine    Tablet 20 milliGRAM(s) Oral at bedtime  insulin glargine Injectable (LANTUS) 14 Unit(s) SubCutaneous at bedtime  insulin lispro (HumaLOG) corrective regimen sliding scale   SubCutaneous three times a day before meals  insulin lispro (HumaLOG) corrective regimen sliding scale   SubCutaneous at bedtime  ketotifen 0.025% Ophthalmic Solution 1 Drop(s) Both EYES two times a day  methimazole 5 milliGRAM(s) Oral daily  metoprolol tartrate 50 milliGRAM(s) Oral two times a day  sevelamer carbonate 800 milliGRAM(s) Oral three times a day with meals  sodium chloride 0.9% lock flush 3 milliLiter(s) IV Push every 8 hours  tiotropium 18 MICROgram(s) Capsule 1 Capsule(s) Inhalation daily    MEDICATIONS  (PRN):  acetaminophen   Tablet .. 650 milliGRAM(s) Oral every 6 hours PRN Mild Pain (1 - 3)  dextrose 40% Gel 15 Gram(s) Oral once PRN Blood Glucose LESS THAN 70 milliGRAM(s)/deciLiter  glucagon  Injectable 1 milliGRAM(s) IntraMuscular once PRN Glucose <70 milliGRAM(s)/deciLiter    Pertinent Labs: 10-01 @ 04:24: Na 124<L>, BUN 79<H>, Cr 9.14<H>, BG 96, K+ 5.0, ALT/SGPT 23, AST/SGOT 19, -PO4: 5.1    Finger Sticks:  POCT Blood Glucose.: 87 mg/dL (10-01 @ 07:48)  POCT Blood Glucose.: 109 mg/dL (10-01 @ 03:10)  POCT Blood Glucose.: 112 mg/dL ( @ 21:55)  POCT Blood Glucose.: 74 mg/dL ( @ 17:23)  POCT Blood Glucose.: 96 mg/dL ( @ 12:40)      Skin per nursing documentation:   Edema: +1 generalized, +1 left knee, left leg, right knee, right leg    Estimated Needs:   [ x] no change since previous assessment  [ ] recalculated:     Previous Nutrition Diagnosis:  Increased Nutrient Needs...  Nutrition Diagnosis is: being addressed with po diet        Recommend  continue consistent CHO with snack, no concentrated K+, no concentrated phosphorous.   monitor need for diet ed reinforcement for T2MD and ESRD  monitor need for pt acceptance to add Nepro x1 daily  provide food preferences as requested by Pt/family within diet restrictions  encourage PO intake during meal times       Monitoring and Evaluation:     Continue to monitor Nutritional intake, Tolerance to diet prescription, weights, labs, skin integrity    RD remains available upon request and will follow up per protocol  Melonie Fleming MA, RD, CDN #[id number] Nutrition Follow Up Note  Patient seen for: follow up    · Reason for Admission	sp   C3L  pt receiving HD at bedside at time of visit    PT recommending subacute rehab  Source: pt, chart    Diet : consistent carbohydrate with snack, no concentrated K+, no concentrated PO4    Patient reports: pt with limited ability to communicate during visit due to having a mask on his face. he was able to reply to questions requiring a (yes/no) response. he is eating well, refused need for Nepro shake at this time even though pt would benefit from increased protein.      PO intake : >75%     Source for PO intake: pt          Daily Weight in k.4 (10-01), Weight in k.2 (), Weight in k (), Weight in k.5 (), Weight in k.8 (), Weight in k.6 (), Weight in k.6 ()  % Weight Change: 3% gain since previous assess on     Pertinent Medications: MEDICATIONS  (STANDING):  ALBUTerol/ipratropium for Nebulization 3 milliLiter(s) Nebulizer every 6 hours  amiodarone    Tablet 200 milliGRAM(s) Oral daily  amiodarone    Tablet   Oral   aspirin enteric coated 81 milliGRAM(s) Oral daily  atorvastatin 40 milliGRAM(s) Oral at bedtime  dextrose 5%. 1000 milliLiter(s) (50 mL/Hr) IV Continuous <Continuous>  dextrose 50% Injectable 12.5 Gram(s) IV Push once  dextrose 50% Injectable 25 Gram(s) IV Push once  dextrose 50% Injectable 25 Gram(s) IV Push once  docusate sodium 100 milliGRAM(s) Oral three times a day  famotidine    Tablet 20 milliGRAM(s) Oral at bedtime  insulin glargine Injectable (LANTUS) 14 Unit(s) SubCutaneous at bedtime  insulin lispro (HumaLOG) corrective regimen sliding scale   SubCutaneous three times a day before meals  insulin lispro (HumaLOG) corrective regimen sliding scale   SubCutaneous at bedtime  ketotifen 0.025% Ophthalmic Solution 1 Drop(s) Both EYES two times a day  methimazole 5 milliGRAM(s) Oral daily  metoprolol tartrate 50 milliGRAM(s) Oral two times a day  sevelamer carbonate 800 milliGRAM(s) Oral three times a day with meals  sodium chloride 0.9% lock flush 3 milliLiter(s) IV Push every 8 hours  tiotropium 18 MICROgram(s) Capsule 1 Capsule(s) Inhalation daily    MEDICATIONS  (PRN):  acetaminophen   Tablet .. 650 milliGRAM(s) Oral every 6 hours PRN Mild Pain (1 - 3)  dextrose 40% Gel 15 Gram(s) Oral once PRN Blood Glucose LESS THAN 70 milliGRAM(s)/deciLiter  glucagon  Injectable 1 milliGRAM(s) IntraMuscular once PRN Glucose <70 milliGRAM(s)/deciLiter    Pertinent Labs: 10-01 @ 04:24: Na 124<L>, BUN 79<H>, Cr 9.14<H>, BG 96, K+ 5.0, ALT/SGPT 23, AST/SGOT 19, -PO4: 5.1    Finger Sticks:  POCT Blood Glucose.: 87 mg/dL (10-01 @ 07:48)  POCT Blood Glucose.: 109 mg/dL (10-01 @ 03:10)  POCT Blood Glucose.: 112 mg/dL ( @ 21:55)  POCT Blood Glucose.: 74 mg/dL ( @ 17:23)  POCT Blood Glucose.: 96 mg/dL ( @ 12:40)      Skin per nursing documentation:   Edema: +1 generalized, +1 left knee, left leg, right knee, right leg    Estimated Needs:   [ x] no change since previous assessment  [ ] recalculated:     Previous Nutrition Diagnosis:  Increased Nutrient Needs...  Nutrition Diagnosis is: being addressed with po diet        Recommend  change diet to consistent CHO with snack, no concentrated K+, no concentrated phosphorous, DASH.   monitor need for diet ed reinforcement for T2MD and ESRD  monitor need for pt acceptance to add Nepro x1 daily  provide food preferences as requested by Pt/family within diet restrictions  encourage PO intake during meal times   RD will add double protein to entree  Nephro-Dejon      Monitoring and Evaluation:     Continue to monitor Nutritional intake, Tolerance to diet prescription, weights, labs, skin integrity    RD remains available upon request and will follow up per protocol  Melonie Fleming MA, RD, CDN #[id number] Nutrition Follow Up Note  Patient seen for: follow up    · Reason for Admission	sp   C3L  pt receiving HD at bedside at time of visit    PT recommending subacute rehab  Source: pt, chart    Diet : consistent carbohydrate with snack, no concentrated K+, no concentrated PO4    Patient reports: pt with limited ability to communicate during visit due to having a mask on his face. he was able to reply to questions requiring a (yes/no) response. he is eating well, refused need for Nepro shake at this time even though pt would benefit from increased protein.      PO intake : >75%     Source for PO intake: pt          Daily Weight in k.4 (10-01), Weight in k.2 (), Weight in k (), Weight in k.5 (), Weight in k.8 (), Weight in k.6 (), Weight in k.6 ()  % Weight Change: 3% gain since previous assess on     Pertinent Medications: MEDICATIONS  (STANDING):  ALBUTerol/ipratropium for Nebulization 3 milliLiter(s) Nebulizer every 6 hours  amiodarone    Tablet 200 milliGRAM(s) Oral daily  amiodarone    Tablet   Oral   aspirin enteric coated 81 milliGRAM(s) Oral daily  atorvastatin 40 milliGRAM(s) Oral at bedtime  dextrose 5%. 1000 milliLiter(s) (50 mL/Hr) IV Continuous <Continuous>  dextrose 50% Injectable 12.5 Gram(s) IV Push once  dextrose 50% Injectable 25 Gram(s) IV Push once  dextrose 50% Injectable 25 Gram(s) IV Push once  docusate sodium 100 milliGRAM(s) Oral three times a day  famotidine    Tablet 20 milliGRAM(s) Oral at bedtime  insulin glargine Injectable (LANTUS) 14 Unit(s) SubCutaneous at bedtime  insulin lispro (HumaLOG) corrective regimen sliding scale   SubCutaneous three times a day before meals  insulin lispro (HumaLOG) corrective regimen sliding scale   SubCutaneous at bedtime  ketotifen 0.025% Ophthalmic Solution 1 Drop(s) Both EYES two times a day  methimazole 5 milliGRAM(s) Oral daily  metoprolol tartrate 50 milliGRAM(s) Oral two times a day  sevelamer carbonate 800 milliGRAM(s) Oral three times a day with meals  sodium chloride 0.9% lock flush 3 milliLiter(s) IV Push every 8 hours  tiotropium 18 MICROgram(s) Capsule 1 Capsule(s) Inhalation daily    MEDICATIONS  (PRN):  acetaminophen   Tablet .. 650 milliGRAM(s) Oral every 6 hours PRN Mild Pain (1 - 3)  dextrose 40% Gel 15 Gram(s) Oral once PRN Blood Glucose LESS THAN 70 milliGRAM(s)/deciLiter  glucagon  Injectable 1 milliGRAM(s) IntraMuscular once PRN Glucose <70 milliGRAM(s)/deciLiter    Pertinent Labs: 10-01 @ 04:24: Na 124<L>, BUN 79<H>, Cr 9.14<H>, BG 96, K+ 5.0, ALT/SGPT 23, AST/SGOT 19, -PO4: 5.1    Finger Sticks:  POCT Blood Glucose.: 87 mg/dL (10-01 @ 07:48)  POCT Blood Glucose.: 109 mg/dL (10-01 @ 03:10)  POCT Blood Glucose.: 112 mg/dL ( @ 21:55)  POCT Blood Glucose.: 74 mg/dL ( @ 17:23)  POCT Blood Glucose.: 96 mg/dL ( @ 12:40)      Skin per nursing documentation:   Edema: +1 generalized, +1 left knee, left leg, right knee, right leg    Estimated Needs:   [ x] no change since previous assessment  [ ] recalculated:     Previous Nutrition Diagnosis:  Increased Nutrient Needs...  Nutrition Diagnosis is: being addressed with po diet        Recommend  change diet to consistent CHO with snack, no concentrated K+, no concentrated phosphorous, DASH.   monitor need for diet ed reinforcement for T2MD and ESRD  monitor need for pt acceptance to add Nepro x1 daily  provide food preferences as requested by Pt/family within diet restrictions  encourage PO intake during meal times   RD will add double protein to meals  Nephro-Dejon      Monitoring and Evaluation:     Continue to monitor Nutritional intake, Tolerance to diet prescription, weights, labs, skin integrity    RD remains available upon request and will follow up per protocol  Melonie Fleming MA, RD, CDN #[phone]

## 2019-10-01 NOTE — PROGRESS NOTE ADULT - ASSESSMENT
Assessment  DMT2: 69y Male with DM T2 with hyperglycemia, was on oral meds at home on low dose insulin, FS in acceptable range, no new hypoglycemic episode eating partial meals, appears comfortable.  CAD: Postop CABG 9/23, on medications, no chest pain, stable, monitored.  HTN: Controlled, on antihypertensive medications.  Hyperthyroidism: On low dose Methimazole. New TFTs WNL: TSH 1.48, Free T4 1.3.  ESRD: Monitor labs/BMP            Yg Mackey MD  Cell: [phone]  Office: [phone]

## 2019-10-01 NOTE — PROGRESS NOTE ADULT - SUBJECTIVE AND OBJECTIVE BOX
[facility] - NII Knight / NII Mendiola / JASWANT Arroyo/ NII Mackey/ NII Macias/ BRIGITTE Esposito / ARAMIS Calle / LUDA Bowen  ---------------------------------------------------------------------------------------------------------------  seen and examined today for ESRD on HD  Interval : low sodium observed  VITALS:  T(F): 98.2 (10-01-19 @ 13:15), Max: 98.2 (10-01-19 @ 13:15)  HR: 112 (10-01-19 @ 13:15)  BP: 115/73 (10-01-19 @ 13:15)  RR: 22 (10-01-19 @ 13:15)  SpO2: 100% (10-01-19 @ 13:15)  Wt(kg): --    09-30 @ 07:01  -  10-01 @ 07:00  --------------------------------------------------------  IN: 831 mL / OUT: 0 mL / NET: 831 mL    10-01 @ 07:01  -  10-01 @ 14:05  --------------------------------------------------------  IN: 700 mL / OUT: 2000 mL / NET: -1300 mL      Physical Exam :-  Constitutional: NAD  Neck: Supple.  Respiratory: Bilateral equal breath sounds, coarse  Cardiovascular: S1, S2 normal,  Gastrointestinal: Bowel Sounds present, soft, non tender.  Extremities: 2+ edema  Neurological: Alert and Oriented x 3, no focal deficits  Psychiatric: Normal mood, normal affect  Data:-  Allergies :   lisinopril (Other)    Hospital Medications:   MEDICATIONS  (STANDING):  ALBUTerol/ipratropium for Nebulization 3 milliLiter(s) Nebulizer every 6 hours  amiodarone    Tablet 200 milliGRAM(s) Oral daily  amiodarone    Tablet   Oral   aspirin enteric coated 81 milliGRAM(s) Oral daily  atorvastatin 40 milliGRAM(s) Oral at bedtime  dextrose 5%. 1000 milliLiter(s) (50 mL/Hr) IV Continuous <Continuous>  dextrose 50% Injectable 12.5 Gram(s) IV Push once  dextrose 50% Injectable 25 Gram(s) IV Push once  dextrose 50% Injectable 25 Gram(s) IV Push once  docusate sodium 100 milliGRAM(s) Oral three times a day  famotidine    Tablet 20 milliGRAM(s) Oral at bedtime  insulin glargine Injectable (LANTUS) 14 Unit(s) SubCutaneous at bedtime  insulin lispro (HumaLOG) corrective regimen sliding scale   SubCutaneous three times a day before meals  insulin lispro (HumaLOG) corrective regimen sliding scale   SubCutaneous at bedtime  ketotifen 0.025% Ophthalmic Solution 1 Drop(s) Both EYES two times a day  methimazole 5 milliGRAM(s) Oral daily  metoprolol tartrate 50 milliGRAM(s) Oral two times a day  sevelamer carbonate 800 milliGRAM(s) Oral three times a day with meals  sodium chloride 1 Gram(s) Oral two times a day  sodium chloride 0.9% lock flush 3 milliLiter(s) IV Push every 8 hours  tiotropium 18 MICROgram(s) Capsule 1 Capsule(s) Inhalation daily    10-01    124<L>  |  84<L>  |  79<H>  ----------------------------<  96  5.0   |  20<L>  |  9.14<H>    Ca    8.8      01 Oct 2019 04:24    TPro  5.9<L>  /  Alb  3.0<L>  /  TBili  0.3  /  DBili  0.1  /  AST  19  /  ALT  19  /  AlkPhos  65  10-01    Creatinine Trend: 9.14 <--, 7.54 <--, 6.09 <--, 6.08 <--, 7.36 <--, 5.36 <--, 6.23 <--                        9.8    15.2  )-----------( 219      ( 01 Oct 2019 04:24 )             29.3

## 2019-10-01 NOTE — PROGRESS NOTE ADULT - SUBJECTIVE AND OBJECTIVE BOX
S: Denies chest pain, SOB, palpitations or dizziness. Review of systems otherwise (-)      MEDICATIONS  (STANDING):  ALBUTerol/ipratropium for Nebulization 3 milliLiter(s) Nebulizer every 6 hours  amiodarone    Tablet 200 milliGRAM(s) Oral daily  amiodarone    Tablet   Oral   aspirin enteric coated 81 milliGRAM(s) Oral daily  atorvastatin 40 milliGRAM(s) Oral at bedtime  dextrose 5%. 1000 milliLiter(s) (50 mL/Hr) IV Continuous <Continuous>  dextrose 50% Injectable 12.5 Gram(s) IV Push once  dextrose 50% Injectable 25 Gram(s) IV Push once  dextrose 50% Injectable 25 Gram(s) IV Push once  docusate sodium 100 milliGRAM(s) Oral three times a day  famotidine    Tablet 20 milliGRAM(s) Oral at bedtime  insulin glargine Injectable (LANTUS) 14 Unit(s) SubCutaneous at bedtime  insulin lispro (HumaLOG) corrective regimen sliding scale   SubCutaneous three times a day before meals  insulin lispro (HumaLOG) corrective regimen sliding scale   SubCutaneous at bedtime  ketotifen 0.025% Ophthalmic Solution 1 Drop(s) Both EYES two times a day  methimazole 5 milliGRAM(s) Oral daily  metoprolol tartrate 50 milliGRAM(s) Oral two times a day  sevelamer carbonate 800 milliGRAM(s) Oral three times a day with meals  sodium chloride 0.9% lock flush 3 milliLiter(s) IV Push every 8 hours  tiotropium 18 MICROgram(s) Capsule 1 Capsule(s) Inhalation daily    MEDICATIONS  (PRN):  acetaminophen   Tablet .. 650 milliGRAM(s) Oral every 6 hours PRN Mild Pain (1 - 3)  dextrose 40% Gel 15 Gram(s) Oral once PRN Blood Glucose LESS THAN 70 milliGRAM(s)/deciLiter  glucagon  Injectable 1 milliGRAM(s) IntraMuscular once PRN Glucose <70 milliGRAM(s)/deciLiter      LABS:                            9.8    15.2  )-----------( 219      ( 01 Oct 2019 04:24 )             29.3     Hemoglobin: 9.8 g/dL (10-01 @ 04:24)  Hemoglobin: 8.9 g/dL ( @ 02:26)  Hemoglobin: 9.0 g/dL ( @ 09:54)  Hemoglobin: 8.9 g/dL ( @ 02:26)  Hemoglobin: 9.2 g/dL ( @ 00:54)    10-01    124<L>  |  84<L>  |  79<H>  ----------------------------<  96  5.0   |  20<L>  |  9.14<H>    Ca    8.8      01 Oct 2019 04:24    TPro  5.9<L>  /  Alb  3.0<L>  /  TBili  0.3  /  DBili  0.1  /  AST  19  /  ALT  19  /  AlkPhos  65  10-01    Creatinine Trend: 9.14<--, 7.54<--, 6.09<--, 6.08<--, 7.36<--, 5.36<--   PT/INR - ( 01 Oct 2019 04:24 )   PT: 30.8 sec;   INR: 2.60 ratio         PTT - ( 01 Oct 2019 04:24 )  PTT:34.5 sec          19 @ 07:  -  10-01-19 @ 07:00  --------------------------------------------------------  IN: 831 mL / OUT: 0 mL / NET: 831 mL    10-01-19 @ 07:01  -  10-01-19 @ 12:33  --------------------------------------------------------  IN: 200 mL / OUT: 0 mL / NET: 200 mL        PHYSICAL EXAM  Vital Signs Last 24 Hrs  T(C): 36.6 (01 Oct 2019 10:00), Max: 36.7 (30 Sep 2019 14:23)  T(F): 97.8 (01 Oct 2019 10:00), Max: 98.1 (30 Sep 2019 14:23)  HR: 89 (01 Oct 2019 11:30) (62 - 89)  BP: 111/66 (01 Oct 2019 11:30) (111/66 - 212/83)  BP(mean): --  RR: 20 (01 Oct 2019 10:00) (18 - 20)  SpO2: 98% (01 Oct 2019 10:00) (83% - 100%)      HEENT:  (-)icterus (-)pallor  CV: N S1 S2 1/6 DEJAH (+)2 Pulses B/l  Resp:  CTA B/L, normal effort  GI: (+) BS Soft, NT, ND  Lymph:  (-)Edema, (-)obvious lymphadenopathy  Skin: Warm to touch, Normal turgor  Psych: Appropriate mood and affect    TELEMETRY: SR 60-80      < from: Cardiac Cath Lab - Adult (19 @ 12:28) >  PROCEDURE:  --  Left heart catheterization.  --  Left coronary angiography.  --  Right coronary angiography.  --  Sonosite - Diagnostic.  VENTRICLES: No LV gram was performed; however, a recent echocardiogram  demonstrated normal global and regional LV function.  CORONARY VESSELS: The coronary circulation is right dominant.  LM:   --  Ostial LM: There was a discrete 90 % stenosis.  LAD:   --  Mid LAD: Angiography showed minor luminal irregularities with no  flow limiting lesions. The stent in the mid LAD is patent.  --  Distal LAD: The vessel was small sized. Angiography showed moderate  atherosclerosis.  CX:   --  Mid circumflex: There was a tubular 50 % stenosis at the site of  a prior stent.  RI:   --  Proximal ramus intermedius: There was a diffuse 50 % stenosis at  the site of a prior stent.  RCA:   --  Mid RCA: There was a tubular 50 % stenosis at the site of a  prior stent.  --  RPLS: Angiography showed minor luminal irregularities with no flow  limiting lesions.  COMPLICATIONS: There were no complications.  DIAGNOSTIC RECOMMENDATIONS: Consultation with a cardiac surgeon be obtained  for surgical opinion and coronary artery bypass grafting.  Prepared and signed by  Jessy Méndez M.D.  Signed 2019 13:42:23  HEMODYNAMIC TABLES  Pressures:  NO PHASE  Pressures:  - HR: 69  Pressures:  - Rhythm:  Pressures:  -- Aortic Pressure (S/D/M): 170/70/113  Pressures:  -- Left Ventricle (s/edp): 164/28/--  Outputs:  NO PHASE  Outputs:  -- CALCULATIONS[age]  Outputs:  -- CALCULATIONS: Body Surface Area: 1.92  Outputs:  -- CALCULATIONS: Height in cm: 168.00  Outputs:  -- CALCULATIONS: Sex: Male  Outputs:  -- CALCULATIONS: Weight in k.60  Outputs:  -- OUTPUTS: O2 consumption: 239.38  Outputs:  -- OUTPUTS: Vo2 Indexed: 125.00  < end of copied text >      ASSESSMENT/PLAN: 68 y/o male with PMHx of CAD, HTN, HLD, T2DM, and ESRD on HD presented after abnormal stress test to [facility] for elective cardiac catheterization. Patient admitted post cath after cath revealed severe ostial LM disease. Now transferred to [facility] for CABG. S/p C3L on . Patient developed new onset Afib on .  	   - EP f/u appreciated - 4-6 weeks of coumadin and Amio  - Continue ASA, Statin  - Pain control  - HD per renal  - Supportive care per CTS  - Patient to f/u with Dr. Méndez when discharged    Wilfred Robin PA-C  [facility]  Pager: [phone]

## 2019-10-01 NOTE — PROGRESS NOTE ADULT - ASSESSMENT
69M ESRD on HD(Tu/Th/Sa), HTN, CAD s/p PCI with 15 stents placed, s/p cardiac cath today which showed LM disease with 90%, now transferred to [facility] for CT Surgery evaluation, now s/p CABG

## 2019-10-02 LAB
ANION GAP SERPL CALC-SCNC: 18 MMOL/L — HIGH (ref 5–17)
APTT BLD: 35.8 SEC — SIGNIFICANT CHANGE UP (ref 27.5–36.3)
BUN SERPL-MCNC: 63 MG/DL — HIGH (ref 7–23)
CALCIUM SERPL-MCNC: 8.8 MG/DL — SIGNIFICANT CHANGE UP (ref 8.4–10.5)
CHLORIDE SERPL-SCNC: 90 MMOL/L — LOW (ref 96–108)
CO2 SERPL-SCNC: 26 MMOL/L — SIGNIFICANT CHANGE UP (ref 22–31)
CREAT SERPL-MCNC: 7.42 MG/DL — HIGH (ref 0.5–1.3)
GLUCOSE BLDC GLUCOMTR-MCNC: 129 MG/DL — HIGH (ref 70–99)
GLUCOSE BLDC GLUCOMTR-MCNC: 134 MG/DL — HIGH (ref 70–99)
GLUCOSE BLDC GLUCOMTR-MCNC: 152 MG/DL — HIGH (ref 70–99)
GLUCOSE BLDC GLUCOMTR-MCNC: 182 MG/DL — HIGH (ref 70–99)
GLUCOSE SERPL-MCNC: 141 MG/DL — HIGH (ref 70–99)
HCT VFR BLD CALC: 27.8 % — LOW (ref 39–50)
HGB BLD-MCNC: 8.8 G/DL — LOW (ref 13–17)
INR BLD: 2.59 RATIO — HIGH (ref 0.88–1.16)
INR BLD: 2.68 RATIO — HIGH (ref 0.88–1.16)
MCHC RBC-ENTMCNC: 28.8 PG — SIGNIFICANT CHANGE UP (ref 27–34)
MCHC RBC-ENTMCNC: 31.7 GM/DL — LOW (ref 32–36)
MCV RBC AUTO: 90.8 FL — SIGNIFICANT CHANGE UP (ref 80–100)
NRBC # BLD: 0 /100 WBCS — SIGNIFICANT CHANGE UP (ref 0–0)
PLATELET # BLD AUTO: 278 K/UL — SIGNIFICANT CHANGE UP (ref 150–400)
POTASSIUM SERPL-MCNC: 4.7 MMOL/L — SIGNIFICANT CHANGE UP (ref 3.5–5.3)
POTASSIUM SERPL-SCNC: 4.7 MMOL/L — SIGNIFICANT CHANGE UP (ref 3.5–5.3)
PROTHROM AB SERPL-ACNC: 30.4 SEC — HIGH (ref 10–12.9)
PROTHROM AB SERPL-ACNC: 31.8 SEC — HIGH (ref 10–12.9)
RBC # BLD: 3.06 M/UL — LOW (ref 4.2–5.8)
RBC # FLD: 13.5 % — SIGNIFICANT CHANGE UP (ref 10.3–14.5)
SODIUM SERPL-SCNC: 134 MMOL/L — LOW (ref 135–145)
WBC # BLD: 11.35 K/UL — HIGH (ref 3.8–10.5)
WBC # FLD AUTO: 11.35 K/UL — HIGH (ref 3.8–10.5)

## 2019-10-02 PROCEDURE — 71045 X-RAY EXAM CHEST 1 VIEW: CPT | Mod: 26

## 2019-10-02 RX ADMIN — SODIUM CHLORIDE 1 GRAM(S): 9 INJECTION INTRAMUSCULAR; INTRAVENOUS; SUBCUTANEOUS at 06:09

## 2019-10-02 RX ADMIN — Medication 3 MILLILITER(S): at 22:13

## 2019-10-02 RX ADMIN — Medication 3 MILLILITER(S): at 11:12

## 2019-10-02 RX ADMIN — Medication 50 MILLIGRAM(S): at 06:09

## 2019-10-02 RX ADMIN — INSULIN GLARGINE 14 UNIT(S): 100 INJECTION, SOLUTION SUBCUTANEOUS at 23:09

## 2019-10-02 RX ADMIN — SODIUM CHLORIDE 3 MILLILITER(S): 9 INJECTION INTRAMUSCULAR; INTRAVENOUS; SUBCUTANEOUS at 22:12

## 2019-10-02 RX ADMIN — SEVELAMER CARBONATE 800 MILLIGRAM(S): 2400 POWDER, FOR SUSPENSION ORAL at 12:14

## 2019-10-02 RX ADMIN — Medication 81 MILLIGRAM(S): at 11:12

## 2019-10-02 RX ADMIN — SODIUM CHLORIDE 3 MILLILITER(S): 9 INJECTION INTRAMUSCULAR; INTRAVENOUS; SUBCUTANEOUS at 06:02

## 2019-10-02 RX ADMIN — KETOTIFEN FUMARATE 1 DROP(S): 0.34 SOLUTION OPHTHALMIC at 17:41

## 2019-10-02 RX ADMIN — Medication 650 MILLIGRAM(S): at 08:38

## 2019-10-02 RX ADMIN — Medication 650 MILLIGRAM(S): at 09:10

## 2019-10-02 RX ADMIN — Medication 50 MILLIGRAM(S): at 17:41

## 2019-10-02 RX ADMIN — SEVELAMER CARBONATE 800 MILLIGRAM(S): 2400 POWDER, FOR SUSPENSION ORAL at 17:41

## 2019-10-02 RX ADMIN — FAMOTIDINE 20 MILLIGRAM(S): 10 INJECTION INTRAVENOUS at 22:13

## 2019-10-02 RX ADMIN — KETOTIFEN FUMARATE 1 DROP(S): 0.34 SOLUTION OPHTHALMIC at 06:10

## 2019-10-02 RX ADMIN — AMIODARONE HYDROCHLORIDE 200 MILLIGRAM(S): 400 TABLET ORAL at 06:09

## 2019-10-02 RX ADMIN — Medication 100 MILLIGRAM(S): at 06:10

## 2019-10-02 RX ADMIN — Medication 100 MILLIGRAM(S): at 22:13

## 2019-10-02 RX ADMIN — Medication 3 MILLILITER(S): at 17:42

## 2019-10-02 RX ADMIN — ATORVASTATIN CALCIUM 40 MILLIGRAM(S): 80 TABLET, FILM COATED ORAL at 22:13

## 2019-10-02 RX ADMIN — SODIUM CHLORIDE 3 MILLILITER(S): 9 INJECTION INTRAMUSCULAR; INTRAVENOUS; SUBCUTANEOUS at 14:15

## 2019-10-02 RX ADMIN — Medication 3 MILLILITER(S): at 06:10

## 2019-10-02 RX ADMIN — SEVELAMER CARBONATE 800 MILLIGRAM(S): 2400 POWDER, FOR SUSPENSION ORAL at 08:03

## 2019-10-02 RX ADMIN — Medication 1: at 12:13

## 2019-10-02 NOTE — PROGRESS NOTE ADULT - ATTENDING COMMENTS
discussed with patient in detail, all questions answered.  discussed with patient's family at bedside in detail

## 2019-10-02 NOTE — PROGRESS NOTE ADULT - PROBLEM SELECTOR PLAN 5
continue statin

## 2019-10-02 NOTE — PROGRESS NOTE ADULT - SUBJECTIVE AND OBJECTIVE BOX
EP ATTENDING    tele: NSR, brief episode of PAF this morning    no palpitations, no syncope, no angina, ROS otherwise -    acetaminophen   Tablet .. 650 milliGRAM(s) Oral every 6 hours PRN  ALBUTerol/ipratropium for Nebulization 3 milliLiter(s) Nebulizer every 6 hours  amiodarone    Tablet 200 milliGRAM(s) Oral daily  amiodarone    Tablet   Oral   aspirin enteric coated 81 milliGRAM(s) Oral daily  atorvastatin 40 milliGRAM(s) Oral at bedtime  dextrose 40% Gel 15 Gram(s) Oral once PRN  dextrose 5%. 1000 milliLiter(s) IV Continuous <Continuous>  dextrose 50% Injectable 12.5 Gram(s) IV Push once  dextrose 50% Injectable 25 Gram(s) IV Push once  dextrose 50% Injectable 25 Gram(s) IV Push once  docusate sodium 100 milliGRAM(s) Oral three times a day  famotidine    Tablet 20 milliGRAM(s) Oral at bedtime  glucagon  Injectable 1 milliGRAM(s) IntraMuscular once PRN  insulin glargine Injectable (LANTUS) 14 Unit(s) SubCutaneous at bedtime  insulin lispro (HumaLOG) corrective regimen sliding scale   SubCutaneous three times a day before meals  insulin lispro (HumaLOG) corrective regimen sliding scale   SubCutaneous at bedtime  ketotifen 0.025% Ophthalmic Solution 1 Drop(s) Both EYES two times a day  methimazole 5 milliGRAM(s) Oral daily  metoprolol tartrate 50 milliGRAM(s) Oral two times a day  sevelamer carbonate 800 milliGRAM(s) Oral three times a day with meals  sodium chloride 0.9% lock flush 3 milliLiter(s) IV Push every 8 hours  tiotropium 18 MICROgram(s) Capsule 1 Capsule(s) Inhalation daily                            8.8    11.35 )-----------( 278      ( 02 Oct 2019 05:00 )             27.8       10-02    134<L>  |  90<L>  |  63<H>  ----------------------------<  141<H>  4.7   |  26  |  7.42<H>    Ca    8.8      02 Oct 2019 05:00    TPro  5.9<L>  /  Alb  3.0<L>  /  TBili  0.3  /  DBili  0.1  /  AST  19  /  ALT  19  /  AlkPhos  65  10-01            T(C): 36.5 (10-02-19 @ 04:36), Max: 36.9 (10-01-19 @ 19:51)  HR: 56 (10-02-19 @ 09:41) (56 - 110)  BP: 149/75 (10-02-19 @ 06:08) (133/67 - 149/75)  RR: 20 (10-02-19 @ 06:08) (18 - 20)  SpO2: 100% (10-02-19 @ 09:41) (96% - 100%)  Wt(kg): --    Appearance: Normal	  HEENT:   Normal oral mucosa, PERRL, EOMI	  Lymphatic: No lymphadenopathy , no edema  RRR, no murmurs  Respiratory: Lungs clear to auscultation, normal effort 	  Gastrointestinal:  Soft, Non-tender, + BS	  Skin: No rashes, No ecchymoses, No cyanosis, warm to touch  Musculoskeletal: Normal range of motion, normal strength  Psychiatry:  Mood & affect appropriate                A/P) He is a pleasant 68 y/o male PMH ESRD on HD and CAD s/p multiple PCIs from 1304-7071. He now is s/p a CABG, and had post-op AF. Review of all EKGs demonstrates no evidence of pre-existing AF. His LVEF remains normal. He denies palpitation, syncope, nor angina.    -agree with 4-6 weeks of coumadin/amio  -shouldn't need a/c long term as AF was only post-op  -no further inpatient EP workup needed      Alison Archuleta M.D., [facility]  Cardiac Electrophysiology  [facility]  [address] [place] [address], [address] [place] [address]  [email or web address]    office [phone]  pager [phone]

## 2019-10-02 NOTE — PROGRESS NOTE ADULT - SUBJECTIVE AND OBJECTIVE BOX
S: Reports some mild SOB but in no distress on nasal cannula. Denies chest pain, palpitations or dizziness. Review of systems otherwise (-)      MEDICATIONS  (STANDING):  ALBUTerol/ipratropium for Nebulization 3 milliLiter(s) Nebulizer every 6 hours  amiodarone    Tablet 200 milliGRAM(s) Oral daily  amiodarone    Tablet   Oral   aspirin enteric coated 81 milliGRAM(s) Oral daily  atorvastatin 40 milliGRAM(s) Oral at bedtime  dextrose 5%. 1000 milliLiter(s) (50 mL/Hr) IV Continuous <Continuous>  dextrose 50% Injectable 12.5 Gram(s) IV Push once  dextrose 50% Injectable 25 Gram(s) IV Push once  dextrose 50% Injectable 25 Gram(s) IV Push once  docusate sodium 100 milliGRAM(s) Oral three times a day  famotidine    Tablet 20 milliGRAM(s) Oral at bedtime  insulin glargine Injectable (LANTUS) 14 Unit(s) SubCutaneous at bedtime  insulin lispro (HumaLOG) corrective regimen sliding scale   SubCutaneous three times a day before meals  insulin lispro (HumaLOG) corrective regimen sliding scale   SubCutaneous at bedtime  ketotifen 0.025% Ophthalmic Solution 1 Drop(s) Both EYES two times a day  methimazole 5 milliGRAM(s) Oral daily  metoprolol tartrate 50 milliGRAM(s) Oral two times a day  sevelamer carbonate 800 milliGRAM(s) Oral three times a day with meals  sodium chloride 0.9% lock flush 3 milliLiter(s) IV Push every 8 hours  tiotropium 18 MICROgram(s) Capsule 1 Capsule(s) Inhalation daily    MEDICATIONS  (PRN):  acetaminophen   Tablet .. 650 milliGRAM(s) Oral every 6 hours PRN Mild Pain (1 - 3)  dextrose 40% Gel 15 Gram(s) Oral once PRN Blood Glucose LESS THAN 70 milliGRAM(s)/deciLiter  glucagon  Injectable 1 milliGRAM(s) IntraMuscular once PRN Glucose <70 milliGRAM(s)/deciLiter      LABS:                            8.8    11.35 )-----------( 278      ( 02 Oct 2019 05:00 )             27.8     Hemoglobin: 8.8 g/dL (10-02 @ 05:00)  Hemoglobin: 9.8 g/dL (10-01 @ 04:24)  Hemoglobin: 8.9 g/dL ( @ 02:26)  Hemoglobin: 9.0 g/dL ( @ 09:54)  Hemoglobin: 8.9 g/dL ( @ 02:26)    10-02    134<L>  |  90<L>  |  63<H>  ----------------------------<  141<H>  4.7   |  26  |  7.42<H>    Ca    8.8      02 Oct 2019 05:00    TPro  5.9<L>  /  Alb  3.0<L>  /  TBili  0.3  /  DBili  0.1  /  AST  19  /  ALT  19  /  AlkPhos  65  10-01    Creatinine Trend: 7.42<--, 9.14<--, 7.54<--, 6.09<--, 6.08<--, 7.36<--   PT/INR - ( 02 Oct 2019 05:00 )   PT: 30.4 sec;   INR: 2.59 ratio         PTT - ( 02 Oct 2019 05:00 )  PTT:35.8 sec          10-01-19 @ 07:01  -  10-02-19 @ 07:00  --------------------------------------------------------  IN: 1400 mL / OUT: 2000 mL / NET: -600 mL    10-02-19 @ 07:01  -  10-02-19 @ 12:21  --------------------------------------------------------  IN: 360 mL / OUT: 1 mL / NET: 359 mL        PHYSICAL EXAM  Vital Signs Last 24 Hrs  T(C): 36.5 (02 Oct 2019 04:36), Max: 36.9 (01 Oct 2019 19:51)  T(F): 97.7 (02 Oct 2019 04:36), Max: 98.5 (01 Oct 2019 19:51)  HR: 56 (02 Oct 2019 09:41) (56 - 112)  BP: 149/75 (02 Oct 2019 06:08) (103/76 - 149/75)  BP(mean): --  RR: 20 (02 Oct 2019 06:08) (18 - 22)  SpO2: 100% (02 Oct 2019 09:41) (96% - 100%)    Gen: Appears well in NAD  HEENT:  (-)icterus (-)pallor  CV: N S1 S2 1/6 DEJAH (+)2 Pulses B/l  Resp: Bibasilar crackles, normal effort  GI: (+) BS Soft, NT, ND  Lymph:  (+) B/L LE Edema, (-)obvious lymphadenopathy  Skin: Warm to touch, Normal turgor  Psych: Appropriate mood and affect      TELEMETRY: Afib overnight now SB/SR 50-60s      < from: Cardiac Cath Lab - Adult (19 @ 12:28) >  PROCEDURE:  --  Left heart catheterization.  --  Left coronary angiography.  --  Right coronary angiography.  --  Sonosite - Diagnostic.  VENTRICLES: No LV gram was performed; however, a recent echocardiogram  demonstrated normal global and regional LV function.  CORONARY VESSELS: The coronary circulation is right dominant.  LM:   --  Ostial LM: There was a discrete 90 % stenosis.  LAD:   --  Mid LAD: Angiography showed minor luminal irregularities with no  flow limiting lesions. The stent in the mid LAD is patent.  --  Distal LAD: The vessel was small sized. Angiography showed moderate  atherosclerosis.  CX:   --  Mid circumflex: There was a tubular 50 % stenosis at the site of  a prior stent.  RI:   --  Proximal ramus intermedius: There was a diffuse 50 % stenosis at  the site of a prior stent.  RCA:   --  Mid RCA: There was a tubular 50 % stenosis at the site of a  prior stent.  --  RPLS: Angiography showed minor luminal irregularities with no flow  limiting lesions.  COMPLICATIONS: There were no complications.  DIAGNOSTIC RECOMMENDATIONS: Consultation with a cardiac surgeon be obtained  for surgical opinion and coronary artery bypass grafting.  Prepared and signed by  Jessy Méndez M.D.  Signed 2019 13:42:23  HEMODYNAMIC TABLES  Pressures:  NO PHASE  Pressures:  - HR: 69  Pressures:  - Rhythm:  Pressures:  -- Aortic Pressure (S/D/M): 170/70/113  Pressures:  -- Left Ventricle (s/edp): 164/28/--  Outputs:  NO PHASE  Outputs:  -- CALCULATIONS[age]  Outputs:  -- CALCULATIONS: Body Surface Area: 1.92  Outputs:  -- CALCULATIONS: Height in cm: 168.00  Outputs:  -- CALCULATIONS: Sex: Male  Outputs:  -- CALCULATIONS: Weight in k.60  Outputs:  -- OUTPUTS: O2 consumption: 239.38  Outputs:  -- OUTPUTS: Vo2 Indexed: 125.00  < end of copied text >      ASSESSMENT/PLAN: 68 y/o male with PMHx of CAD, HTN, HLD, T2DM, and ESRD on HD presented after abnormal stress test to [facility] for elective cardiac catheterization. Patient admitted post cath after cath revealed severe ostial LM disease. Now transferred to [facility] for CABG. S/p C3L on . Patient developed new onset Afib on .  	   - EP f/u appreciated - 4-6 weeks of coumadin and Amio  - Continue ASA, Statin  - Pain control  - Fluid removal with HD per renal  - Supportive care per CTS  - Patient to f/u with Dr. Méndez when discharged    Wilfred Robin PA-C  [facility]  Pager: [phone]

## 2019-10-02 NOTE — PROGRESS NOTE ADULT - ASSESSMENT
69 y.o. male with pmh of ESRD( HD on tu/Th/sa) at [facility] via LUE AVF. Last dialyzed yesterday 9/14, was sent to [facility] for cath after presenting with SOB and a positive stress tests/p CABG 9/23/19 with new onset afib ;Renal following for ESRD Mx.    labs, chart reviewed

## 2019-10-02 NOTE — PROGRESS NOTE ADULT - SUBJECTIVE AND OBJECTIVE BOX
Patient is a 69y old  Male who presents with a chief complaint of transferred for work up (01 Oct 2019 16:12)      SUBJECTIVE / OVERNIGHT EVENTS: overnight events noted    ROS:  Resp: No cough no sputum production  CVS: No chest pain no palpitations no orthopnea  GI: no N/V/D  : no dysuria, no hematuria  Neuro: no weakness no paresthesias  Heme: No petechiae no easy bruising  Msk: No joint pain no swelling  Skin: No rash no itching        MEDICATIONS  (STANDING):  ALBUTerol/ipratropium for Nebulization 3 milliLiter(s) Nebulizer every 6 hours  amiodarone    Tablet 200 milliGRAM(s) Oral daily  amiodarone    Tablet   Oral   aspirin enteric coated 81 milliGRAM(s) Oral daily  atorvastatin 40 milliGRAM(s) Oral at bedtime  dextrose 5%. 1000 milliLiter(s) (50 mL/Hr) IV Continuous <Continuous>  dextrose 50% Injectable 12.5 Gram(s) IV Push once  dextrose 50% Injectable 25 Gram(s) IV Push once  dextrose 50% Injectable 25 Gram(s) IV Push once  docusate sodium 100 milliGRAM(s) Oral three times a day  famotidine    Tablet 20 milliGRAM(s) Oral at bedtime  insulin glargine Injectable (LANTUS) 14 Unit(s) SubCutaneous at bedtime  insulin lispro (HumaLOG) corrective regimen sliding scale   SubCutaneous three times a day before meals  insulin lispro (HumaLOG) corrective regimen sliding scale   SubCutaneous at bedtime  ketotifen 0.025% Ophthalmic Solution 1 Drop(s) Both EYES two times a day  methimazole 5 milliGRAM(s) Oral daily  metoprolol tartrate 50 milliGRAM(s) Oral two times a day  sevelamer carbonate 800 milliGRAM(s) Oral three times a day with meals  sodium chloride 0.9% lock flush 3 milliLiter(s) IV Push every 8 hours  tiotropium 18 MICROgram(s) Capsule 1 Capsule(s) Inhalation daily    MEDICATIONS  (PRN):  acetaminophen   Tablet .. 650 milliGRAM(s) Oral every 6 hours PRN Mild Pain (1 - 3)  dextrose 40% Gel 15 Gram(s) Oral once PRN Blood Glucose LESS THAN 70 milliGRAM(s)/deciLiter  glucagon  Injectable 1 milliGRAM(s) IntraMuscular once PRN Glucose <70 milliGRAM(s)/deciLiter        CAPILLARY BLOOD GLUCOSE      POCT Blood Glucose.: 182 mg/dL (02 Oct 2019 12:03)  POCT Blood Glucose.: 129 mg/dL (02 Oct 2019 07:43)  POCT Blood Glucose.: 162 mg/dL (01 Oct 2019 22:12)  POCT Blood Glucose.: 118 mg/dL (01 Oct 2019 17:01)    I&O's Summary    01 Oct 2019 07:01  -  02 Oct 2019 07:00  --------------------------------------------------------  IN: 1400 mL / OUT: 2000 mL / NET: -600 mL    02 Oct 2019 07:01  -  02 Oct 2019 13:49  --------------------------------------------------------  IN: 360 mL / OUT: 1 mL / NET: 359 mL        Vital Signs Last 24 Hrs  T(C): 36.5 (02 Oct 2019 04:36), Max: 36.9 (01 Oct 2019 19:51)  T(F): 97.7 (02 Oct 2019 04:36), Max: 98.5 (01 Oct 2019 19:51)  HR: 56 (02 Oct 2019 09:41) (56 - 110)  BP: 149/75 (02 Oct 2019 06:08) (133/67 - 149/75)  BP(mean): --  RR: 20 (02 Oct 2019 06:08) (18 - 20)  SpO2: 100% (02 Oct 2019 09:41) (96% - 100%)      PHYSICAL EXAM:  GENERAL: NAD, well-developed  HEAD:  Atraumatic, Normocephalic  EYES: EOMI, PERRLA, conjunctiva and sclera clear  NECK: Supple, No JVD  CHEST/LUNG: Clear to auscultation bilaterally; decreased breath sounds at bases   HEART: S1S2; soft ejection systolic murmur best heard at left sternal border no rub no gallop   ABDOMEN: Soft, Nontender, Nondistended; Bowel sounds present  EXTREMITIES:  + Peripheral Pulses, No clubbing or cyanosis, 1 + edema  PSYCH: AO x 3,   NEUROLOGY: Alert, no focal motor or sensory deficits  SKIN: No rashes or lesions    LABS:                        8.8    11.35 )-----------( 278      ( 02 Oct 2019 05:00 )             27.8     10-02    134<L>  |  90<L>  |  63<H>  ----------------------------<  141<H>  4.7   |  26  |  7.42<H>    Ca    8.8      02 Oct 2019 05:00    TPro  5.9<L>  /  Alb  3.0<L>  /  TBili  0.3  /  DBili  0.1  /  AST  19  /  ALT  19  /  AlkPhos  65  10-01    PT/INR - ( 02 Oct 2019 12:15 )   PT: 31.8 sec;   INR: 2.68 ratio         PTT - ( 02 Oct 2019 05:00 )  PTT:35.8 sec            All consultant(s) notes reviewed and care discussed with other providers        Contact Number, Dr Cagle [phone]

## 2019-10-02 NOTE — PROGRESS NOTE ADULT - ASSESSMENT
Assessment  DMT2: 69y Male with DM T2 with hyperglycemia, was on oral meds at home, now on low dose insulin, FS in acceptable range, no new hypoglycemic episode eating partial meals, appears comfortable.  CAD: Postop CABG 9/23, on medications, no chest pain, stable, monitored.  HTN: Controlled, on antihypertensive medications.  Hyperthyroidism: On low dose Methimazole. New TFTs WNL: TSH 1.48, Free T4 1.3.  ESRD: HD tomorrow. Monitor labs/BMP            Yg Mackey MD  Cell: [phone]  Office: [phone]

## 2019-10-02 NOTE — PROGRESS NOTE ADULT - SUBJECTIVE AND OBJECTIVE BOX
[facility] - NII Knight / NII Mendiola / JASWANT Arroyo/ NII Mackey/ NII Macias/ BRIGITTE Esposito / ARAMIS Calle / LUDA Bowen  ---------------------------------------------------------------------------------------------------------------  seen and examined today for ESRD on HD  Interval : NAD  VITALS:  T(F): 97.7 (10-02-19 @ 04:36), Max: 98.5 (10-01-19 @ 19:51)  HR: 56 (10-02-19 @ 09:41)  BP: 149/75 (10-02-19 @ 06:08)  RR: 20 (10-02-19 @ 06:08)  SpO2: 100% (10-02-19 @ 09:41)  Wt(kg): --    10-01 @ 07:01  -  10-02 @ 07:00  --------------------------------------------------------  IN: 1400 mL / OUT: 2000 mL / NET: -600 mL    10-02 @ 07:01  -  10-02 @ 10:43  --------------------------------------------------------  IN: 360 mL / OUT: 0 mL / NET: 360 mL      Physical Exam :-  Constitutional: NAD  Neck: Supple.  Respiratory: Bilateral equal breath sounds,  Cardiovascular: S1, S2 normal,  Gastrointestinal: Bowel Sounds present, soft, non tender.  Extremities: 2+ edema  Neurological: Alert and Oriented x 3, no focal deficits  Psychiatric: Normal mood, normal affect  Data:-  Allergies :   lisinopril (Other)    Hospital Medications:   MEDICATIONS  (STANDING):  ALBUTerol/ipratropium for Nebulization 3 milliLiter(s) Nebulizer every 6 hours  amiodarone    Tablet 200 milliGRAM(s) Oral daily  amiodarone    Tablet   Oral   aspirin enteric coated 81 milliGRAM(s) Oral daily  atorvastatin 40 milliGRAM(s) Oral at bedtime  dextrose 5%. 1000 milliLiter(s) (50 mL/Hr) IV Continuous <Continuous>  dextrose 50% Injectable 12.5 Gram(s) IV Push once  dextrose 50% Injectable 25 Gram(s) IV Push once  dextrose 50% Injectable 25 Gram(s) IV Push once  docusate sodium 100 milliGRAM(s) Oral three times a day  famotidine    Tablet 20 milliGRAM(s) Oral at bedtime  insulin glargine Injectable (LANTUS) 14 Unit(s) SubCutaneous at bedtime  insulin lispro (HumaLOG) corrective regimen sliding scale   SubCutaneous three times a day before meals  insulin lispro (HumaLOG) corrective regimen sliding scale   SubCutaneous at bedtime  ketotifen 0.025% Ophthalmic Solution 1 Drop(s) Both EYES two times a day  methimazole 5 milliGRAM(s) Oral daily  metoprolol tartrate 50 milliGRAM(s) Oral two times a day  sevelamer carbonate 800 milliGRAM(s) Oral three times a day with meals  sodium chloride 0.9% lock flush 3 milliLiter(s) IV Push every 8 hours  tiotropium 18 MICROgram(s) Capsule 1 Capsule(s) Inhalation daily    10-02    134<L>  |  90<L>  |  63<H>  ----------------------------<  141<H>  4.7   |  26  |  7.42<H>    Ca    8.8      02 Oct 2019 05:00    TPro  5.9<L>  /  Alb  3.0<L>  /  TBili  0.3  /  DBili  0.1  /  AST  19  /  ALT  19  /  AlkPhos  65  10-01    Creatinine Trend: 7.42 <--, 9.14 <--, 7.54 <--, 6.09 <--, 6.08 <--, 7.36 <--, 5.36 <--                        8.8    11.35 )-----------( 278      ( 02 Oct 2019 05:00 )             27.8

## 2019-10-02 NOTE — PROGRESS NOTE ADULT - PROBLEM SELECTOR PROBLEM 5
Hypercholesterolemia

## 2019-10-02 NOTE — PROGRESS NOTE ADULT - ASSESSMENT
69M ESRD on HD(Tu/Th/Sa), HTN, CAD s/p PCI with 15 stents placed, s/p cardiac cath today which showed LM disease with 90%, now transferred to [facility] for CT Surgery evaluation, now s/p CABG

## 2019-10-02 NOTE — PROGRESS NOTE ADULT - SUBJECTIVE AND OBJECTIVE BOX
S: feels sob.  Pain controlled    Telemetry:  SR 60    Vital Signs Last 24 Hrs  T(C): 36.5 (10-02-19 @ 04:36), Max: 36.9 (10-01-19 @ 19:51)  T(F): 97.7 (10-02-19 @ 04:36), Max: 98.5 (10-01-19 @ 19:51)  HR: 56 (10-02-19 @ 09:41) (56 - 110)  BP: 149/75 (10-02-19 @ 06:08) (133/67 - 149/75)  RR: 20 (10-02-19 @ 06:08) (18 - 20)  SpO2: 100% (10-02-19 @ 09:41) (96% - 100%)                   10-01 @ 07:01  -  10-02 @ 07:00  --------------------------------------------------------  IN: 1400 mL / OUT: 2000 mL / NET: -600 mL    10-02 @ 07:01  -  10-02 @ 13:40  --------------------------------------------------------  IN: 360 mL / OUT: 1 mL / NET: 359 mL        Daily Weight in k.3 (02 Oct 2019 07:00)        POCT Blood Glucose.: 182 mg/dL (02 Oct 2019 12:03)  POCT Blood Glucose.: 129 mg/dL (02 Oct 2019 07:43)  POCT Blood Glucose.: 162 mg/dL (01 Oct 2019 22:12)  POCT Blood Glucose.: 118 mg/dL (01 Oct 2019 17:01)          Drains:     MS         [  ] Drainage:                 L Pleural  [  ]  Drainage:                R Pleural  [  ]  Drainage:    Pacing Wires        [  ]   Settings:                                  Isolated  [  ]    Coumadin    [x] YES          [  ]      NO         Reason:     hold for thoracentesis                            8.8    11.35 )-----------( 278      ( 02 Oct 2019 05:00 )             27.8       10    134<L>  |  90<L>  |  63<H>  ----------------------------<  141<H>  4.7   |  26  |  7.42<H>    Ca    8.8      02 Oct 2019 05:00    TPro  5.9<L>  /  Alb  3.0<L>  /  TBili  0.3  /  DBili  0.1  /  AST  19  /  ALT  19  /  AlkPhos  65  10      PT/INR - ( 02 Oct 2019 12:15 )   PT: 31.8 sec;   INR: 2.68 ratio         PTT - ( 02 Oct 2019 05:00 )  PTT:35.8 sec    PHYSICAL EXAM:    Neurology: alert and oriented x 3, nonfocal, no gross deficits    CV :  S1S2 heard RRR    Sternal Wound :  CDI , Stable    Lungs:  +wheeze    Abdomen: soft, nontender, nondistended, positive bowel sounds, last bowel movement            Extremities:     1+ edema          acetaminophen   Tablet .. 650 milliGRAM(s) Oral every 6 hours PRN  ALBUTerol/ipratropium for Nebulization 3 milliLiter(s) Nebulizer every 6 hours  amiodarone    Tablet 200 milliGRAM(s) Oral daily  amiodarone    Tablet   Oral   aspirin enteric coated 81 milliGRAM(s) Oral daily  atorvastatin 40 milliGRAM(s) Oral at bedtime  dextrose 40% Gel 15 Gram(s) Oral once PRN  dextrose 5%. 1000 milliLiter(s) IV Continuous <Continuous>  dextrose 50% Injectable 12.5 Gram(s) IV Push once  dextrose 50% Injectable 25 Gram(s) IV Push once  dextrose 50% Injectable 25 Gram(s) IV Push once  docusate sodium 100 milliGRAM(s) Oral three times a day  famotidine    Tablet 20 milliGRAM(s) Oral at bedtime  glucagon  Injectable 1 milliGRAM(s) IntraMuscular once PRN  insulin glargine Injectable (LANTUS) 14 Unit(s) SubCutaneous at bedtime  insulin lispro (HumaLOG) corrective regimen sliding scale   SubCutaneous three times a day before meals  insulin lispro (HumaLOG) corrective regimen sliding scale   SubCutaneous at bedtime  ketotifen 0.025% Ophthalmic Solution 1 Drop(s) Both EYES two times a day  methimazole 5 milliGRAM(s) Oral daily  metoprolol tartrate 50 milliGRAM(s) Oral two times a day  sevelamer carbonate 800 milliGRAM(s) Oral three times a day with meals  sodium chloride 0.9% lock flush 3 milliLiter(s) IV Push every 8 hours  tiotropium 18 MICROgram(s) Capsule 1 Capsule(s) Inhalation daily                              Physical Therapy Rec:   Home  [  ]   Home w/ PT  [  ]  Rehab  [ x ]    Discussed with Cardiothoracic Team at AM rounds.

## 2019-10-02 NOTE — PROGRESS NOTE ADULT - PROBLEM SELECTOR PLAN 1
Due for HD tomorrow with increased UF  Noted LE edema, coarse breath sounds, and hyponatremia, plan for increase UF next HD session  trend bmp  hyponatremia, unrestricted sodium diet for now

## 2019-10-02 NOTE — PROGRESS NOTE ADULT - ASSESSMENT
69 yr old male   sp   C3l   H  ESRD   CoRS stents,   hyperthyroid,  DM   CVA, MI  preop   endo cslt   Hyperthyroid and hyperglycemia  post op afib   - amio load  9/28   coumadin dosing ,  afib  110   bp stable   amio load  and [id number],  confused-chair alarm   dc percocet  9/29 VSS - LLL diminished - chest pt given - spirometry encouraged oob - o2 weened off - will ck o2 sat. INR 1.53 5mg Coumadin given  d/c planning home Tues?  9/30    VSS    INR    1.93   coumadin 3 mg,  02 sats  86 percent room air  + rhonchi  on nebs,   forgertful  no narcotics  wbc  16   afebrile    urinalysis     10/1   chest xray  atelectasis vs pl effusion B/L,   on bipap this am  now tolerating 3 Liters NC,    OOB to chair,   HD this am,  now in NSR  10/2 SR .  Large left pleural effusion to be tapped when INR <1.8--FFP if INR still elevated in am.  HD as per renal--fluid overload--renal will dialyze tomorrow and increase UF

## 2019-10-03 LAB
ANION GAP SERPL CALC-SCNC: 21 MMOL/L — HIGH (ref 5–17)
APTT BLD: 32.9 SEC — SIGNIFICANT CHANGE UP (ref 27.5–36.3)
APTT BLD: 36.3 SEC — SIGNIFICANT CHANGE UP (ref 27.5–36.3)
BUN SERPL-MCNC: 77 MG/DL — HIGH (ref 7–23)
CALCIUM SERPL-MCNC: 8.5 MG/DL — SIGNIFICANT CHANGE UP (ref 8.4–10.5)
CHLORIDE SERPL-SCNC: 89 MMOL/L — LOW (ref 96–108)
CO2 SERPL-SCNC: 22 MMOL/L — SIGNIFICANT CHANGE UP (ref 22–31)
CREAT SERPL-MCNC: 9.01 MG/DL — HIGH (ref 0.5–1.3)
GLUCOSE BLDC GLUCOMTR-MCNC: 117 MG/DL — HIGH (ref 70–99)
GLUCOSE BLDC GLUCOMTR-MCNC: 140 MG/DL — HIGH (ref 70–99)
GLUCOSE BLDC GLUCOMTR-MCNC: 72 MG/DL — SIGNIFICANT CHANGE UP (ref 70–99)
GLUCOSE BLDC GLUCOMTR-MCNC: 90 MG/DL — SIGNIFICANT CHANGE UP (ref 70–99)
GLUCOSE SERPL-MCNC: 104 MG/DL — HIGH (ref 70–99)
HCT VFR BLD CALC: 28.4 % — LOW (ref 39–50)
HGB BLD-MCNC: 9.1 G/DL — LOW (ref 13–17)
INR BLD: 1.58 RATIO — HIGH (ref 0.88–1.16)
INR BLD: 2.14 RATIO — HIGH (ref 0.88–1.16)
MCHC RBC-ENTMCNC: 29.2 PG — SIGNIFICANT CHANGE UP (ref 27–34)
MCHC RBC-ENTMCNC: 32 GM/DL — SIGNIFICANT CHANGE UP (ref 32–36)
MCV RBC AUTO: 91 FL — SIGNIFICANT CHANGE UP (ref 80–100)
NRBC # BLD: 0 /100 WBCS — SIGNIFICANT CHANGE UP (ref 0–0)
PLATELET # BLD AUTO: 304 K/UL — SIGNIFICANT CHANGE UP (ref 150–400)
POTASSIUM SERPL-MCNC: 4.8 MMOL/L — SIGNIFICANT CHANGE UP (ref 3.5–5.3)
POTASSIUM SERPL-SCNC: 4.8 MMOL/L — SIGNIFICANT CHANGE UP (ref 3.5–5.3)
PROTHROM AB SERPL-ACNC: 18.3 SEC — HIGH (ref 10–12.9)
PROTHROM AB SERPL-ACNC: 25.2 SEC — HIGH (ref 10–12.9)
RBC # BLD: 3.12 M/UL — LOW (ref 4.2–5.8)
RBC # FLD: 13.7 % — SIGNIFICANT CHANGE UP (ref 10.3–14.5)
SODIUM SERPL-SCNC: 132 MMOL/L — LOW (ref 135–145)
WBC # BLD: 12.06 K/UL — HIGH (ref 3.8–10.5)
WBC # FLD AUTO: 12.06 K/UL — HIGH (ref 3.8–10.5)

## 2019-10-03 PROCEDURE — 32557 INSERT CATH PLEURA W/ IMAGE: CPT | Mod: 78

## 2019-10-03 PROCEDURE — 71045 X-RAY EXAM CHEST 1 VIEW: CPT | Mod: 26

## 2019-10-03 RX ORDER — METOPROLOL TARTRATE 50 MG
50 TABLET ORAL EVERY 8 HOURS
Refills: 0 | Status: DISCONTINUED | OUTPATIENT
Start: 2019-10-03 | End: 2019-10-05

## 2019-10-03 RX ORDER — WARFARIN SODIUM 2.5 MG/1
2 TABLET ORAL ONCE
Refills: 0 | Status: COMPLETED | OUTPATIENT
Start: 2019-10-03 | End: 2019-10-03

## 2019-10-03 RX ADMIN — Medication 650 MILLIGRAM(S): at 08:00

## 2019-10-03 RX ADMIN — SEVELAMER CARBONATE 800 MILLIGRAM(S): 2400 POWDER, FOR SUSPENSION ORAL at 17:27

## 2019-10-03 RX ADMIN — Medication 650 MILLIGRAM(S): at 08:30

## 2019-10-03 RX ADMIN — AMIODARONE HYDROCHLORIDE 200 MILLIGRAM(S): 400 TABLET ORAL at 05:17

## 2019-10-03 RX ADMIN — Medication 100 MILLIGRAM(S): at 21:35

## 2019-10-03 RX ADMIN — Medication 81 MILLIGRAM(S): at 13:46

## 2019-10-03 RX ADMIN — ATORVASTATIN CALCIUM 40 MILLIGRAM(S): 80 TABLET, FILM COATED ORAL at 21:35

## 2019-10-03 RX ADMIN — SODIUM CHLORIDE 3 MILLILITER(S): 9 INJECTION INTRAMUSCULAR; INTRAVENOUS; SUBCUTANEOUS at 05:15

## 2019-10-03 RX ADMIN — Medication 3 MILLILITER(S): at 13:47

## 2019-10-03 RX ADMIN — Medication 100 MILLIGRAM(S): at 13:47

## 2019-10-03 RX ADMIN — INSULIN GLARGINE 14 UNIT(S): 100 INJECTION, SOLUTION SUBCUTANEOUS at 21:38

## 2019-10-03 RX ADMIN — SODIUM CHLORIDE 3 MILLILITER(S): 9 INJECTION INTRAMUSCULAR; INTRAVENOUS; SUBCUTANEOUS at 21:36

## 2019-10-03 RX ADMIN — Medication 100 MILLIGRAM(S): at 05:17

## 2019-10-03 RX ADMIN — Medication 3 MILLILITER(S): at 17:26

## 2019-10-03 RX ADMIN — WARFARIN SODIUM 2 MILLIGRAM(S): 2.5 TABLET ORAL at 21:35

## 2019-10-03 RX ADMIN — SODIUM CHLORIDE 3 MILLILITER(S): 9 INJECTION INTRAMUSCULAR; INTRAVENOUS; SUBCUTANEOUS at 13:44

## 2019-10-03 RX ADMIN — Medication 50 MILLIGRAM(S): at 17:26

## 2019-10-03 RX ADMIN — SEVELAMER CARBONATE 800 MILLIGRAM(S): 2400 POWDER, FOR SUSPENSION ORAL at 08:00

## 2019-10-03 RX ADMIN — Medication 50 MILLIGRAM(S): at 05:17

## 2019-10-03 RX ADMIN — FAMOTIDINE 20 MILLIGRAM(S): 10 INJECTION INTRAVENOUS at 21:35

## 2019-10-03 RX ADMIN — KETOTIFEN FUMARATE 1 DROP(S): 0.34 SOLUTION OPHTHALMIC at 17:27

## 2019-10-03 RX ADMIN — Medication 3 MILLILITER(S): at 05:17

## 2019-10-03 RX ADMIN — KETOTIFEN FUMARATE 1 DROP(S): 0.34 SOLUTION OPHTHALMIC at 05:17

## 2019-10-03 NOTE — PROGRESS NOTE ADULT - SUBJECTIVE AND OBJECTIVE BOX
S: no chest pain or sob; Review of systems otherwise (-)      acetaminophen   Tablet .. 650 milliGRAM(s) Oral every 6 hours PRN  ALBUTerol/ipratropium for Nebulization 3 milliLiter(s) Nebulizer every 6 hours  amiodarone    Tablet 200 milliGRAM(s) Oral daily  amiodarone    Tablet   Oral   aspirin enteric coated 81 milliGRAM(s) Oral daily  atorvastatin 40 milliGRAM(s) Oral at bedtime  dextrose 40% Gel 15 Gram(s) Oral once PRN  dextrose 5%. 1000 milliLiter(s) IV Continuous <Continuous>  dextrose 50% Injectable 12.5 Gram(s) IV Push once  dextrose 50% Injectable 25 Gram(s) IV Push once  dextrose 50% Injectable 25 Gram(s) IV Push once  docusate sodium 100 milliGRAM(s) Oral three times a day  famotidine    Tablet 20 milliGRAM(s) Oral at bedtime  glucagon  Injectable 1 milliGRAM(s) IntraMuscular once PRN  insulin glargine Injectable (LANTUS) 14 Unit(s) SubCutaneous at bedtime  insulin lispro (HumaLOG) corrective regimen sliding scale   SubCutaneous three times a day before meals  insulin lispro (HumaLOG) corrective regimen sliding scale   SubCutaneous at bedtime  ketotifen 0.025% Ophthalmic Solution 1 Drop(s) Both EYES two times a day  methimazole 5 milliGRAM(s) Oral daily  metoprolol tartrate 50 milliGRAM(s) Oral two times a day  sevelamer carbonate 800 milliGRAM(s) Oral three times a day with meals  sodium chloride 0.9% lock flush 3 milliLiter(s) IV Push every 8 hours  tiotropium 18 MICROgram(s) Capsule 1 Capsule(s) Inhalation daily                            9.1    12.06 )-----------( 304      ( 03 Oct 2019 04:53 )             28.4       10    132<L>  |  89<L>  |  77<H>  ----------------------------<  104<H>  4.8   |  22  |  9.01<H>    Ca    8.5      03 Oct 2019 04:53              T(C): 36.7 (10-03-19 @ 17:15), Max: 36.8 (10-03-19 @ 13:21)  HR: 106 (10-03-19 @ 17:15) (59 - 112)  BP: 109/71 (10-03-19 @ 17:15) (104/72 - 153/77)  RR: 20 (10-03-19 @ 17:15) (18 - 20)  SpO2: 98% (10-03-19 @ 17:15) (95% - 99%)  Wt(kg): --    I&O's Summary    02 Oct 2019 07:  -  03 Oct 2019 07:00  --------------------------------------------------------  IN: 1000 mL / OUT: 0 mL / NET: 1000 mL    03 Oct 2019 07:01  -  03 Oct 2019 17:25  --------------------------------------------------------  IN: 1520 mL / OUT: 3800 mL / NET: -2280 mL      Gen: Appears well in NAD  HEENT:  (-)icterus (-)pallor  CV: N S1 S2 1/6 DEJAH (+)2 Pulses B/l  Resp: Bibasilar crackles, normal effort  GI: (+) BS Soft, NT, ND  Lymph:  (+) B/L LE Edema, (-)obvious lymphadenopathy  Skin: Warm to touch, Normal turgor  Psych: Appropriate mood and affect      TELEMETRY: SR      < from: Cardiac Cath Lab - Adult (19 @ 12:28) >  PROCEDURE:  --  Left heart catheterization.  --  Left coronary angiography.  --  Right coronary angiography.  --  Sonosite - Diagnostic.  VENTRICLES: No LV gram was performed; however, a recent echocardiogram  demonstrated normal global and regional LV function.  CORONARY VESSELS: The coronary circulation is right dominant.  LM:   --  Ostial LM: There was a discrete 90 % stenosis.  LAD:   --  Mid LAD: Angiography showed minor luminal irregularities with no  flow limiting lesions. The stent in the mid LAD is patent.  --  Distal LAD: The vessel was small sized. Angiography showed moderate  atherosclerosis.  CX:   --  Mid circumflex: There was a tubular 50 % stenosis at the site of  a prior stent.  RI:   --  Proximal ramus intermedius: There was a diffuse 50 % stenosis at  the site of a prior stent.  RCA:   --  Mid RCA: There was a tubular 50 % stenosis at the site of a  prior stent.  --  RPLS: Angiography showed minor luminal irregularities with no flow  limiting lesions.  COMPLICATIONS: There were no complications.  DIAGNOSTIC RECOMMENDATIONS: Consultation with a cardiac surgeon be obtained  for surgical opinion and coronary artery bypass grafting.  Prepared and signed by  Jessy Méndez M.D.  Signed 2019 13:42:23  HEMODYNAMIC TABLES  Pressures:  NO PHASE  Pressures:  - HR: 69  Pressures:  - Rhythm:  Pressures:  -- Aortic Pressure (S/D/M): 170/70/113  Pressures:  -- Left Ventricle (s/edp): 164/28/--  Outputs:  NO PHASE  Outputs:  -- CALCULATIONS[age]  Outputs:  -- CALCULATIONS: Body Surface Area: 1.92  Outputs:  -- CALCULATIONS: Height in cm: 168.00  Outputs:  -- CALCULATIONS: Sex: Male  Outputs:  -- CALCULATIONS: Weight in k.60  Outputs:  -- OUTPUTS: O2 consumption: 239.38  Outputs:  -- OUTPUTS: Vo2 Indexed: 125.00  < end of copied text >      ASSESSMENT/PLAN: 68 y/o male with PMHx of CAD, HTN, HLD, T2DM, and ESRD on HD presented after abnormal stress test to [facility] for elective cardiac catheterization. Patient admitted post cath after cath revealed severe ostial LM disease. Now transferred to [facility] for CABG. S/p C3L on . Patient developed new onset Afib on .  	   - EP f/u appreciated - 4-6 weeks of coumadin and Amio  - Continue ASA, Statin  - continue with fluid removal with HD  - FFP for thoracentesis per CTS  - follow up CT surgery    Kalie Lau MD

## 2019-10-03 NOTE — PROGRESS NOTE ADULT - SUBJECTIVE AND OBJECTIVE BOX
[facility] - NII Knight / NII Mendiola / JASWANT Arroyo/ NII Mackey/ NII Macias/ BRIGITTE Esposito / ARAMIS Calle / LUDA Bowen  ---------------------------------------------------------------------------------------------------------------  seen and examined today for ESRD on HD  Interval : SOB today  VITALS:  T(F): 97.3 (10-03-19 @ 05:00), Max: 98.4 (10-02-19 @ 14:00)  HR: 75 (10-03-19 @ 06:25)  BP: 153/77 (10-03-19 @ 05:00)  RR: 18 (10-03-19 @ 05:00)  SpO2: 95% (10-03-19 @ 06:25)  Wt(kg): --    10-02 @ 07:01  -  10-03 @ 07:00  --------------------------------------------------------  IN: 1000 mL / OUT: 0 mL / NET: 1000 mL      Physical Exam :-  Constitutional: NAD  Neck: Supple.  Respiratory: Bilateral equal breath sounds, coarse breath sounds  Cardiovascular: S1, S2 normal,  Gastrointestinal: Bowel Sounds present, soft, non tender.  Extremities: 2+ edema  Neurological: Alert and Oriented x 3, no focal deficits  Psychiatric: Normal mood, normal affect  Data:-  Allergies :   lisinopril (Other)    Hospital Medications:   MEDICATIONS  (STANDING):  ALBUTerol/ipratropium for Nebulization 3 milliLiter(s) Nebulizer every 6 hours  amiodarone    Tablet 200 milliGRAM(s) Oral daily  amiodarone    Tablet   Oral   aspirin enteric coated 81 milliGRAM(s) Oral daily  atorvastatin 40 milliGRAM(s) Oral at bedtime  dextrose 5%. 1000 milliLiter(s) (50 mL/Hr) IV Continuous <Continuous>  dextrose 50% Injectable 12.5 Gram(s) IV Push once  dextrose 50% Injectable 25 Gram(s) IV Push once  dextrose 50% Injectable 25 Gram(s) IV Push once  docusate sodium 100 milliGRAM(s) Oral three times a day  famotidine    Tablet 20 milliGRAM(s) Oral at bedtime  insulin glargine Injectable (LANTUS) 14 Unit(s) SubCutaneous at bedtime  insulin lispro (HumaLOG) corrective regimen sliding scale   SubCutaneous three times a day before meals  insulin lispro (HumaLOG) corrective regimen sliding scale   SubCutaneous at bedtime  ketotifen 0.025% Ophthalmic Solution 1 Drop(s) Both EYES two times a day  methimazole 5 milliGRAM(s) Oral daily  metoprolol tartrate 50 milliGRAM(s) Oral two times a day  sevelamer carbonate 800 milliGRAM(s) Oral three times a day with meals  sodium chloride 0.9% lock flush 3 milliLiter(s) IV Push every 8 hours  tiotropium 18 MICROgram(s) Capsule 1 Capsule(s) Inhalation daily    10-03    132<L>  |  89<L>  |  77<H>  ----------------------------<  104<H>  4.8   |  22  |  9.01<H>    Ca    8.5      03 Oct 2019 04:53    TPro  5.9<L>  /  Alb  3.0<L>  /  TBili  0.3  /  DBili  0.1  /  AST  19  /  ALT  19  /  AlkPhos  65  10-01    Creatinine Trend: 9.01 <--, 7.42 <--, 9.14 <--, 7.54 <--, 6.09 <--, 6.08 <--, 7.36 <--                        9.1    12.06 )-----------( 304      ( 03 Oct 2019 04:53 )             28.4

## 2019-10-03 NOTE — PROGRESS NOTE ADULT - SUBJECTIVE AND OBJECTIVE BOX
EP    tele: NSR    no palpitations, no syncope, no angina, ROS otherwise -      MEDICATIONS  (STANDING):  ALBUTerol/ipratropium for Nebulization 3 milliLiter(s) Nebulizer every 6 hours  amiodarone    Tablet 200 milliGRAM(s) Oral daily  amiodarone    Tablet   Oral   aspirin enteric coated 81 milliGRAM(s) Oral daily  atorvastatin 40 milliGRAM(s) Oral at bedtime  dextrose 5%. 1000 milliLiter(s) (50 mL/Hr) IV Continuous <Continuous>  dextrose 50% Injectable 12.5 Gram(s) IV Push once  dextrose 50% Injectable 25 Gram(s) IV Push once  dextrose 50% Injectable 25 Gram(s) IV Push once  docusate sodium 100 milliGRAM(s) Oral three times a day  famotidine    Tablet 20 milliGRAM(s) Oral at bedtime  insulin glargine Injectable (LANTUS) 14 Unit(s) SubCutaneous at bedtime  insulin lispro (HumaLOG) corrective regimen sliding scale   SubCutaneous three times a day before meals  insulin lispro (HumaLOG) corrective regimen sliding scale   SubCutaneous at bedtime  ketotifen 0.025% Ophthalmic Solution 1 Drop(s) Both EYES two times a day  methimazole 5 milliGRAM(s) Oral daily  metoprolol tartrate 50 milliGRAM(s) Oral two times a day  sevelamer carbonate 800 milliGRAM(s) Oral three times a day with meals  sodium chloride 0.9% lock flush 3 milliLiter(s) IV Push every 8 hours  tiotropium 18 MICROgram(s) Capsule 1 Capsule(s) Inhalation daily    MEDICATIONS  (PRN):  acetaminophen   Tablet .. 650 milliGRAM(s) Oral every 6 hours PRN Mild Pain (1 - 3)  dextrose 40% Gel 15 Gram(s) Oral once PRN Blood Glucose LESS THAN 70 milliGRAM(s)/deciLiter  glucagon  Injectable 1 milliGRAM(s) IntraMuscular once PRN Glucose <70 milliGRAM(s)/deciLiter      LABS:                            9.1    12.06 )-----------( 304      ( 03 Oct 2019 04:53 )             28.4     Hemoglobin: 9.1 g/dL (10-03 @ 04:53)  Hemoglobin: 8.8 g/dL (10-02 @ 05:00)  Hemoglobin: 9.8 g/dL (10-01 @ 04:24)  Hemoglobin: 8.9 g/dL (09-30 @ 02:26)  Hemoglobin: 9.0 g/dL (09-29 @ 09:54)    10-03    132<L>  |  89<L>  |  77<H>  ----------------------------<  104<H>  4.8   |  22  |  9.01<H>    Ca    8.5      03 Oct 2019 04:53      Creatinine Trend: 9.01<--, 7.42<--, 9.14<--, 7.54<--, 6.09<--, 6.08<--   PT/INR - ( 03 Oct 2019 04:53 )   PT: 25.2 sec;   INR: 2.14 ratio      PTT - ( 03 Oct 2019 04:53 )  PTT:36.3 sec    PHYSICAL EXAM  Vital Signs Last 24 Hrs  T(C): 36.8 (03 Oct 2019 13:21), Max: 36.8 (03 Oct 2019 13:21)  T(F): 98.3 (03 Oct 2019 13:21), Max: 98.3 (03 Oct 2019 13:21)  HR: 67 (03 Oct 2019 13:21) (59 - 75)  BP: 132/71 (03 Oct 2019 13:21) (131/67 - 153/77)  RR: 18 (03 Oct 2019 13:21) (18 - 18)  SpO2: 96% (03 Oct 2019 13:21) (95% - 99%)      Appearance: Normal	  HEENT:   Normal oral mucosa, PERRL, EOMI	  Lymphatic: No lymphadenopathy , no edema  Cardio: RRR, no murmurs  Respiratory: Lungs clear to auscultation, normal effort 	  Gastrointestinal:  Soft, Non-tender, + BS	  Skin: No rashes, No ecchymoses, No cyanosis, warm to touch  Psychiatry:  Mood & affect appropriate    A/P) He is a pleasant 68 y/o male PMH ESRD on HD and CAD s/p multiple PCIs from 7628-3190. He now is s/p a CABG, and had post-op AF. Review of all EKGs demonstrates no evidence of pre-existing AF. His LVEF remains normal. He denies palpitation, syncope, nor angina.    -agree with 4-6 weeks of coumadin/amio  -shouldn't need a/c long term as AF was only post-op  -no further inpatient EP workup needed

## 2019-10-03 NOTE — PROCEDURE NOTE - NSPROCDETAILS_GEN_ALL_CORE
Seldinger technique/ultrasound assessment of fluid (location)/ultrasound assessment of fluid (size)/dressing applied/secured in place/percutaneous/thoracostomy tube placed percutaneously/sterile dressing applied

## 2019-10-03 NOTE — PROGRESS NOTE ADULT - ASSESSMENT
69 yr old male   sp   C3l   H  ESRD   CoRS stents,   hyperthyroid,  DM   CVA, MI  preop   endo cslt   Hyperthyroid and hyperglycemia  post op afib   - amio load  9/28   coumadin dosing ,  afib  110   bp stable   amio load  and [id number],  confused-chair alarm   dc percocet  9/29 VSS - LLL diminished - chest pt given - spirometry encouraged oob - o2 weened off - will ck o2 sat. INR 1.53 5mg Coumadin given  d/c planning home Tues?  9/30    VSS    INR    1.93   coumadin 3 mg,  02 sats  86 percent room air  + rhonchi  on nebs,   forgertful  no narcotics  wbc  16   afebrile    urinalysis     10/1   chest xray  atelectasis vs pl effusion B/L,   on bipap this am  now tolerating 3 Liters NC,    OOB to chair,   HD this am,  now in NSR  10/2 SR .  Large left pleural effusion to be tapped when INR <1.8--FFP if INR still elevated in am.  HD as per renal--fluid overload--renal will dialyze tomorrow and increase UF  10/3  VSS stable.  INR 2.1 --2 FFP with HD recheck INR and perform left thoracentesis if INR <2.  HD today.  Continue to hold coumadin as patient is in SR

## 2019-10-03 NOTE — PROGRESS NOTE ADULT - SUBJECTIVE AND OBJECTIVE BOX
S  feels better less sob    Telemetry:  SR/SB 47-60    Vital Signs Last 24 Hrs  T(C): 36.3 (10-03-19 @ 05:00), Max: 36.9 (10-02-19 @ 14:00)  T(F): 97.3 (10-03-19 @ 05:00), Max: 98.4 (10-02-19 @ 14:00)  HR: 75 (10-03-19 @ 06:25) (59 - 75)  BP: 153/77 (10-03-19 @ 05:00) (131/67 - 153/77)  RR: 18 (10-03-19 @ 05:00) (18 - 18)  SpO2: 95% (10-03-19 @ 06:25) (95% - 98%)                   10-02 @ 07:01  -  10-03 @ 07:00  --------------------------------------------------------  IN: 1000 mL / OUT: 0 mL / NET: 1000 mL    10-03 @ 07:01  -  10-03 @ 12:20  --------------------------------------------------------  IN: 360 mL / OUT: 0 mL / NET: 360 mL          Daily     Daily Weight in k.9 (03 Oct 2019 08:27)        CAPILLARY BLOOD GLUCOSE      POCT Blood Glucose.: 117 mg/dL (03 Oct 2019 12:03)  POCT Blood Glucose.: 90 mg/dL (03 Oct 2019 07:55)  POCT Blood Glucose.: 152 mg/dL (02 Oct 2019 22:22)  POCT Blood Glucose.: 134 mg/dL (02 Oct 2019 17:05)          Drains:     MS         [  ] Drainage:                 L Pleural  [  ]  Drainage:                R Pleural  [  ]  Drainage:    Pacing Wires        [  ]   Settings:                                  Isolated  [  ]    Coumadin    [ ] YES          [ x ]      NO         Reason:     on hold                             9.1    12.06 )-----------( 304      ( 03 Oct 2019 04:53 )             28.4       10-03    132<L>  |  89<L>  |  77<H>  ----------------------------<  104<H>  4.8   |  22  |  9.01<H>    Ca    8.5      03 Oct 2019 04:53        PT/INR - ( 03 Oct 2019 04:53 )   PT: 25.2 sec;   INR: 2.14 ratio         PTT - ( 03 Oct 2019 04:53 )  PTT:36.3 sec    PHYSICAL EXAM:    Neurology: alert and oriented x 3, nonfocal, no gross deficits    CV :  S1S2 heard RRR    Sternal Wound :  CDI , Stable    Lungs: clear to ausc.  Diminished at bases    Abdomen: soft, nontender    ext trace edema              acetaminophen   Tablet .. 650 milliGRAM(s) Oral every 6 hours PRN  ALBUTerol/ipratropium for Nebulization 3 milliLiter(s) Nebulizer every 6 hours  amiodarone    Tablet 200 milliGRAM(s) Oral daily  amiodarone    Tablet   Oral   aspirin enteric coated 81 milliGRAM(s) Oral daily  atorvastatin 40 milliGRAM(s) Oral at bedtime  dextrose 40% Gel 15 Gram(s) Oral once PRN  dextrose 5%. 1000 milliLiter(s) IV Continuous <Continuous>  dextrose 50% Injectable 12.5 Gram(s) IV Push once  dextrose 50% Injectable 25 Gram(s) IV Push once  dextrose 50% Injectable 25 Gram(s) IV Push once  docusate sodium 100 milliGRAM(s) Oral three times a day  famotidine    Tablet 20 milliGRAM(s) Oral at bedtime  glucagon  Injectable 1 milliGRAM(s) IntraMuscular once PRN  insulin glargine Injectable (LANTUS) 14 Unit(s) SubCutaneous at bedtime  insulin lispro (HumaLOG) corrective regimen sliding scale   SubCutaneous three times a day before meals  insulin lispro (HumaLOG) corrective regimen sliding scale   SubCutaneous at bedtime  ketotifen 0.025% Ophthalmic Solution 1 Drop(s) Both EYES two times a day  methimazole 5 milliGRAM(s) Oral daily  metoprolol tartrate 50 milliGRAM(s) Oral two times a day  sevelamer carbonate 800 milliGRAM(s) Oral three times a day with meals  sodium chloride 0.9% lock flush 3 milliLiter(s) IV Push every 8 hours  tiotropium 18 MICROgram(s) Capsule 1 Capsule(s) Inhalation daily                              Physical Therapy Rec:   Home  [  ]   Home w/ PT  [  ]  Rehab  [ x ]  vs home    Discussed with Cardiothoracic Team at AM rounds.

## 2019-10-03 NOTE — PROGRESS NOTE ADULT - ASSESSMENT
69 y.o. male with pmh of ESRD( HD on tu/Th/sa) at [facility] via LUE AVF. Last dialyzed yesterday 9/14, was sent to [facility] for cath after presenting with SOB and a positive stress tests/p CABG 9/23/19 with new onset afib ;Renal following for ESRD Mx.    labs, chart reviewed

## 2019-10-03 NOTE — PROGRESS NOTE ADULT - PROBLEM SELECTOR PLAN 1
Due for HD today with 3.5kg UF as tolerated  Will continue to have HD with high UF, likely loosing weight from cardiac cachexia  trend bmp  hyponatremia, unrestricted sodium diet for now

## 2019-10-03 NOTE — PROGRESS NOTE ADULT - ASSESSMENT
Assessment  DMT2: 69y Male with DM T2 with hyperglycemia, was on oral meds at home, now on low dose insulin, FS in acceptable range, no new hypoglycemic episode eating partial meals, appears comfortable, waiting for dialysis.  CAD: Postop CABG 9/23, on medications, no chest pain, stable, monitored.  HTN: Controlled, on antihypertensive medications.  Hyperthyroidism: On low dose Methimazole. New TFTs WNL: TSH 1.48, Free T4 1.3.  ESRD: HD planned today. Monitor labs/BMP            Yg Mackey MD  Cell: [phone]  Office: [phone] Assessment  DMT2: 69y Male with DM T2 with hyperglycemia, was on oral meds at home, now on low dose insulin, FS in acceptable range, no new hypoglycemic episode eating partial meals,  appears comfortable, waiting for dialysis.  CAD: Postop CABG 9/23, on medications, no chest pain, stable, monitored.  HTN: Controlled, on antihypertensive medications.  Hyperthyroidism: On low dose Methimazole. New TFTs WNL: TSH 1.48, Free T4 1.3.  ESRD: HD planned today. Monitor labs/BMP            Yg Mackey MD  Cell: [phone]  Office: [phone]

## 2019-10-03 NOTE — PROGRESS NOTE ADULT - PROBLEM SELECTOR PLAN 1
Will continue current insulin regimen for now. Will continue monitoring FS, log, will Follow up.  Suggest to d/c on current hospital insulin regimen.  Patient counseled for compliance with consistent low carb diet.

## 2019-10-03 NOTE — PROGRESS NOTE ADULT - SUBJECTIVE AND OBJECTIVE BOX
Chief complaint  Patient is a 69y old  Male who presents with a chief complaint of transferred for work up (02 Oct 2019 13:47)   Review of systems  Patient in bed, looks comfortable, no fever, no hypoglycemia.    Labs and Fingersticks  CAPILLARY BLOOD GLUCOSE      POCT Blood Glucose.: 90 mg/dL (03 Oct 2019 07:55)  POCT Blood Glucose.: 152 mg/dL (02 Oct 2019 22:22)  POCT Blood Glucose.: 134 mg/dL (02 Oct 2019 17:05)  POCT Blood Glucose.: 182 mg/dL (02 Oct 2019 12:03)      Anion Gap, Serum: 21 <H> (10-03 @ 04:53)  Anion Gap, Serum: 18 <H> (10-02 @ 05:00)      Calcium, Total Serum: 8.5 (10-03 @ 04:53)  Calcium, Total Serum: 8.8 (10-02 @ 05:00)          10-03    132<L>  |  89<L>  |  77<H>  ----------------------------<  104<H>  4.8   |  22  |  9.01<H>    Ca    8.5      03 Oct 2019 04:53                          9.1    12.06 )-----------( 304      ( 03 Oct 2019 04:53 )             28.4     Medications  MEDICATIONS  (STANDING):  ALBUTerol/ipratropium for Nebulization 3 milliLiter(s) Nebulizer every 6 hours  amiodarone    Tablet 200 milliGRAM(s) Oral daily  amiodarone    Tablet   Oral   aspirin enteric coated 81 milliGRAM(s) Oral daily  atorvastatin 40 milliGRAM(s) Oral at bedtime  dextrose 5%. 1000 milliLiter(s) (50 mL/Hr) IV Continuous <Continuous>  dextrose 50% Injectable 12.5 Gram(s) IV Push once  dextrose 50% Injectable 25 Gram(s) IV Push once  dextrose 50% Injectable 25 Gram(s) IV Push once  docusate sodium 100 milliGRAM(s) Oral three times a day  famotidine    Tablet 20 milliGRAM(s) Oral at bedtime  insulin glargine Injectable (LANTUS) 14 Unit(s) SubCutaneous at bedtime  insulin lispro (HumaLOG) corrective regimen sliding scale   SubCutaneous three times a day before meals  insulin lispro (HumaLOG) corrective regimen sliding scale   SubCutaneous at bedtime  ketotifen 0.025% Ophthalmic Solution 1 Drop(s) Both EYES two times a day  methimazole 5 milliGRAM(s) Oral daily  metoprolol tartrate 50 milliGRAM(s) Oral two times a day  sevelamer carbonate 800 milliGRAM(s) Oral three times a day with meals  sodium chloride 0.9% lock flush 3 milliLiter(s) IV Push every 8 hours  tiotropium 18 MICROgram(s) Capsule 1 Capsule(s) Inhalation daily      Physical Exam  General: Patient comfortable in bed  Vital Signs Last 12 Hrs  T(F): 97.3 (10-03-19 @ 05:00), Max: 97.3 (10-03-19 @ 05:00)  HR: 75 (10-03-19 @ 06:25) (67 - 75)  BP: 153/77 (10-03-19 @ 05:00) (153/77 - 153/77)  BP(mean): --  RR: 18 (10-03-19 @ 05:00) (18 - 18)  SpO2: 95% (10-03-19 @ 06:25) (95% - 98%)  Neck: No palpable thyroid nodules.  CVS: S1S2, No murmurs  Respiratory: No wheezing, no crepitations  GI: Abdomen soft, bowel sounds positive  Musculoskeletal:  edema lower extremities.   Skin: No skin rashes, no ecchymosis    Diagnostics Chief complaint  Patient is a 69y old  Male who presents with a chief complaint of transferred for work up (02 Oct 2019 13:47)   Review of systems  Patient in bed, looks comfortable, no fever,  no hypoglycemia.    Labs and Fingersticks  CAPILLARY BLOOD GLUCOSE      POCT Blood Glucose.: 90 mg/dL (03 Oct 2019 07:55)  POCT Blood Glucose.: 152 mg/dL (02 Oct 2019 22:22)  POCT Blood Glucose.: 134 mg/dL (02 Oct 2019 17:05)  POCT Blood Glucose.: 182 mg/dL (02 Oct 2019 12:03)      Anion Gap, Serum: 21 <H> (10-03 @ 04:53)  Anion Gap, Serum: 18 <H> (10-02 @ 05:00)      Calcium, Total Serum: 8.5 (10-03 @ 04:53)  Calcium, Total Serum: 8.8 (10-02 @ 05:00)          10-03    132<L>  |  89<L>  |  77<H>  ----------------------------<  104<H>  4.8   |  22  |  9.01<H>    Ca    8.5      03 Oct 2019 04:53                          9.1    12.06 )-----------( 304      ( 03 Oct 2019 04:53 )             28.4     Medications  MEDICATIONS  (STANDING):  ALBUTerol/ipratropium for Nebulization 3 milliLiter(s) Nebulizer every 6 hours  amiodarone    Tablet 200 milliGRAM(s) Oral daily  amiodarone    Tablet   Oral   aspirin enteric coated 81 milliGRAM(s) Oral daily  atorvastatin 40 milliGRAM(s) Oral at bedtime  dextrose 5%. 1000 milliLiter(s) (50 mL/Hr) IV Continuous <Continuous>  dextrose 50% Injectable 12.5 Gram(s) IV Push once  dextrose 50% Injectable 25 Gram(s) IV Push once  dextrose 50% Injectable 25 Gram(s) IV Push once  docusate sodium 100 milliGRAM(s) Oral three times a day  famotidine    Tablet 20 milliGRAM(s) Oral at bedtime  insulin glargine Injectable (LANTUS) 14 Unit(s) SubCutaneous at bedtime  insulin lispro (HumaLOG) corrective regimen sliding scale   SubCutaneous three times a day before meals  insulin lispro (HumaLOG) corrective regimen sliding scale   SubCutaneous at bedtime  ketotifen 0.025% Ophthalmic Solution 1 Drop(s) Both EYES two times a day  methimazole 5 milliGRAM(s) Oral daily  metoprolol tartrate 50 milliGRAM(s) Oral two times a day  sevelamer carbonate 800 milliGRAM(s) Oral three times a day with meals  sodium chloride 0.9% lock flush 3 milliLiter(s) IV Push every 8 hours  tiotropium 18 MICROgram(s) Capsule 1 Capsule(s) Inhalation daily      Physical Exam  General: Patient comfortable in bed  Vital Signs Last 12 Hrs  T(F): 97.3 (10-03-19 @ 05:00), Max: 97.3 (10-03-19 @ 05:00)  HR: 75 (10-03-19 @ 06:25) (67 - 75)  BP: 153/77 (10-03-19 @ 05:00) (153/77 - 153/77)  BP(mean): --  RR: 18 (10-03-19 @ 05:00) (18 - 18)  SpO2: 95% (10-03-19 @ 06:25) (95% - 98%)  Neck: No palpable thyroid nodules.  CVS: S1S2, No murmurs  Respiratory: No wheezing, no crepitations  GI: Abdomen soft, bowel sounds positive  Musculoskeletal:  edema lower extremities.   Skin: No skin rashes, no ecchymosis    Diagnostics

## 2019-10-04 DIAGNOSIS — Z95.1 PRESENCE OF AORTOCORONARY BYPASS GRAFT: ICD-10-CM

## 2019-10-04 LAB
ANION GAP SERPL CALC-SCNC: 15 MMOL/L — SIGNIFICANT CHANGE UP (ref 5–17)
BUN SERPL-MCNC: 58 MG/DL — HIGH (ref 7–23)
CALCIUM SERPL-MCNC: 8.6 MG/DL — SIGNIFICANT CHANGE UP (ref 8.4–10.5)
CHLORIDE SERPL-SCNC: 91 MMOL/L — LOW (ref 96–108)
CO2 SERPL-SCNC: 28 MMOL/L — SIGNIFICANT CHANGE UP (ref 22–31)
CREAT SERPL-MCNC: 7.68 MG/DL — HIGH (ref 0.5–1.3)
GLUCOSE BLDC GLUCOMTR-MCNC: 108 MG/DL — HIGH (ref 70–99)
GLUCOSE BLDC GLUCOMTR-MCNC: 154 MG/DL — HIGH (ref 70–99)
GLUCOSE BLDC GLUCOMTR-MCNC: 164 MG/DL — HIGH (ref 70–99)
GLUCOSE BLDC GLUCOMTR-MCNC: 89 MG/DL — SIGNIFICANT CHANGE UP (ref 70–99)
GLUCOSE SERPL-MCNC: 82 MG/DL — SIGNIFICANT CHANGE UP (ref 70–99)
HAV IGM SER-ACNC: SIGNIFICANT CHANGE UP
HBV CORE IGM SER-ACNC: SIGNIFICANT CHANGE UP
HBV SURFACE AG SER-ACNC: SIGNIFICANT CHANGE UP
HCT VFR BLD CALC: 28.3 % — LOW (ref 39–50)
HCV AB S/CO SERPL IA: 0.23 S/CO — SIGNIFICANT CHANGE UP (ref 0–0.99)
HCV AB SERPL-IMP: SIGNIFICANT CHANGE UP
HGB BLD-MCNC: 8.8 G/DL — LOW (ref 13–17)
INR BLD: 1.82 RATIO — HIGH (ref 0.88–1.16)
MCHC RBC-ENTMCNC: 28.7 PG — SIGNIFICANT CHANGE UP (ref 27–34)
MCHC RBC-ENTMCNC: 31.1 GM/DL — LOW (ref 32–36)
MCV RBC AUTO: 92.2 FL — SIGNIFICANT CHANGE UP (ref 80–100)
NRBC # BLD: 0 /100 WBCS — SIGNIFICANT CHANGE UP (ref 0–0)
PLATELET # BLD AUTO: 278 K/UL — SIGNIFICANT CHANGE UP (ref 150–400)
POTASSIUM SERPL-MCNC: 3.7 MMOL/L — SIGNIFICANT CHANGE UP (ref 3.5–5.3)
POTASSIUM SERPL-SCNC: 3.7 MMOL/L — SIGNIFICANT CHANGE UP (ref 3.5–5.3)
PROTHROM AB SERPL-ACNC: 21.1 SEC — HIGH (ref 10–12.9)
RBC # BLD: 3.07 M/UL — LOW (ref 4.2–5.8)
RBC # FLD: 13.7 % — SIGNIFICANT CHANGE UP (ref 10.3–14.5)
SODIUM SERPL-SCNC: 134 MMOL/L — LOW (ref 135–145)
WBC # BLD: 13.02 K/UL — HIGH (ref 3.8–10.5)
WBC # FLD AUTO: 13.02 K/UL — HIGH (ref 3.8–10.5)

## 2019-10-04 RX ORDER — WARFARIN SODIUM 2.5 MG/1
3 TABLET ORAL ONCE
Refills: 0 | Status: COMPLETED | OUTPATIENT
Start: 2019-10-04 | End: 2019-10-04

## 2019-10-04 RX ORDER — INSULIN LISPRO 100/ML
5 VIAL (ML) SUBCUTANEOUS
Refills: 0 | Status: DISCONTINUED | OUTPATIENT
Start: 2019-10-04 | End: 2019-10-06

## 2019-10-04 RX ADMIN — Medication 3 MILLILITER(S): at 06:15

## 2019-10-04 RX ADMIN — FAMOTIDINE 20 MILLIGRAM(S): 10 INJECTION INTRAVENOUS at 21:22

## 2019-10-04 RX ADMIN — SODIUM CHLORIDE 3 MILLILITER(S): 9 INJECTION INTRAMUSCULAR; INTRAVENOUS; SUBCUTANEOUS at 17:33

## 2019-10-04 RX ADMIN — Medication 3 MILLILITER(S): at 17:51

## 2019-10-04 RX ADMIN — ATORVASTATIN CALCIUM 40 MILLIGRAM(S): 80 TABLET, FILM COATED ORAL at 21:22

## 2019-10-04 RX ADMIN — Medication 5 UNIT(S): at 17:51

## 2019-10-04 RX ADMIN — INSULIN GLARGINE 14 UNIT(S): 100 INJECTION, SOLUTION SUBCUTANEOUS at 22:18

## 2019-10-04 RX ADMIN — WARFARIN SODIUM 3 MILLIGRAM(S): 2.5 TABLET ORAL at 22:18

## 2019-10-04 RX ADMIN — Medication 1: at 12:50

## 2019-10-04 RX ADMIN — Medication 1: at 07:48

## 2019-10-04 RX ADMIN — KETOTIFEN FUMARATE 1 DROP(S): 0.34 SOLUTION OPHTHALMIC at 05:48

## 2019-10-04 RX ADMIN — Medication 50 MILLIGRAM(S): at 00:30

## 2019-10-04 RX ADMIN — Medication 50 MILLIGRAM(S): at 12:51

## 2019-10-04 RX ADMIN — SEVELAMER CARBONATE 800 MILLIGRAM(S): 2400 POWDER, FOR SUSPENSION ORAL at 17:50

## 2019-10-04 RX ADMIN — SODIUM CHLORIDE 3 MILLILITER(S): 9 INJECTION INTRAMUSCULAR; INTRAVENOUS; SUBCUTANEOUS at 05:46

## 2019-10-04 RX ADMIN — Medication 3 MILLILITER(S): at 12:52

## 2019-10-04 RX ADMIN — Medication 100 MILLIGRAM(S): at 05:47

## 2019-10-04 RX ADMIN — Medication 650 MILLIGRAM(S): at 09:12

## 2019-10-04 RX ADMIN — Medication 81 MILLIGRAM(S): at 12:51

## 2019-10-04 RX ADMIN — SEVELAMER CARBONATE 800 MILLIGRAM(S): 2400 POWDER, FOR SUSPENSION ORAL at 12:51

## 2019-10-04 RX ADMIN — Medication 100 MILLIGRAM(S): at 21:22

## 2019-10-04 RX ADMIN — Medication 650 MILLIGRAM(S): at 08:42

## 2019-10-04 RX ADMIN — AMIODARONE HYDROCHLORIDE 200 MILLIGRAM(S): 400 TABLET ORAL at 05:47

## 2019-10-04 RX ADMIN — Medication 100 MILLIGRAM(S): at 12:53

## 2019-10-04 RX ADMIN — SODIUM CHLORIDE 3 MILLILITER(S): 9 INJECTION INTRAMUSCULAR; INTRAVENOUS; SUBCUTANEOUS at 21:18

## 2019-10-04 RX ADMIN — SEVELAMER CARBONATE 800 MILLIGRAM(S): 2400 POWDER, FOR SUSPENSION ORAL at 07:49

## 2019-10-04 RX ADMIN — Medication 50 MILLIGRAM(S): at 21:21

## 2019-10-04 RX ADMIN — KETOTIFEN FUMARATE 1 DROP(S): 0.34 SOLUTION OPHTHALMIC at 17:51

## 2019-10-04 NOTE — PROGRESS NOTE ADULT - PROBLEM SELECTOR PLAN 2
Continue with Amio 200 mg PO daily   Continue with Lopressor 50 mg PO TID.   (+) PW --> Isolated   Resume Coumadin 3 mg PO tonight for a INR goal 1.5-2

## 2019-10-04 NOTE — PROGRESS NOTE ADULT - SUBJECTIVE AND OBJECTIVE BOX
EP    tele: NSR    no palpitations, no syncope, no angina, ROS otherwise -  moderate pain at pigtail site on exam             acetaminophen   Tablet .. 650 milliGRAM(s) Oral every 6 hours PRN  ALBUTerol/ipratropium for Nebulization 3 milliLiter(s) Nebulizer every 6 hours  amiodarone    Tablet 200 milliGRAM(s) Oral daily  amiodarone    Tablet   Oral   aspirin enteric coated 81 milliGRAM(s) Oral daily  atorvastatin 40 milliGRAM(s) Oral at bedtime  dextrose 40% Gel 15 Gram(s) Oral once PRN  dextrose 5%. 1000 milliLiter(s) IV Continuous <Continuous>  dextrose 50% Injectable 12.5 Gram(s) IV Push once  dextrose 50% Injectable 25 Gram(s) IV Push once  dextrose 50% Injectable 25 Gram(s) IV Push once  docusate sodium 100 milliGRAM(s) Oral three times a day  famotidine    Tablet 20 milliGRAM(s) Oral at bedtime  glucagon  Injectable 1 milliGRAM(s) IntraMuscular once PRN  insulin glargine Injectable (LANTUS) 14 Unit(s) SubCutaneous at bedtime  insulin lispro (HumaLOG) corrective regimen sliding scale   SubCutaneous three times a day before meals  insulin lispro (HumaLOG) corrective regimen sliding scale   SubCutaneous at bedtime  ketotifen 0.025% Ophthalmic Solution 1 Drop(s) Both EYES two times a day  methimazole 5 milliGRAM(s) Oral daily  metoprolol tartrate 50 milliGRAM(s) Oral every 8 hours  sevelamer carbonate 800 milliGRAM(s) Oral three times a day with meals  sodium chloride 0.9% lock flush 3 milliLiter(s) IV Push every 8 hours  tiotropium 18 MICROgram(s) Capsule 1 Capsule(s) Inhalation daily                            9.1    12.06 )-----------( 304      ( 03 Oct 2019 04:53 )             28.4       Hemoglobin: 9.1 g/dL (10-03 @ 04:53)  Hemoglobin: 8.8 g/dL (10-02 @ 05:00)  Hemoglobin: 9.8 g/dL (10-01 @ 04:24)  Hemoglobin: 8.9 g/dL (09-30 @ 02:26)  Hemoglobin: 9.0 g/dL (09-29 @ 09:54)      10-03    132<L>  |  89<L>  |  77<H>  ----------------------------<  104<H>  4.8   |  22  |  9.01<H>    Ca    8.5      03 Oct 2019 04:53      Creatinine Trend: 9.01<--, 7.42<--, 9.14<--, 7.54<--, 6.09<--, 6.08<--    COAGS: PT/INR - ( 03 Oct 2019 17:23 )   PT: 18.3 sec;   INR: 1.58 ratio         PTT - ( 03 Oct 2019 17:23 )  PTT:32.9 sec          T(C): 36.4 (10-03-19 @ 20:11), Max: 36.8 (10-03-19 @ 13:21)  HR: 69 (10-04-19 @ 00:08) (59 - 112)  BP: 121/72 (10-03-19 @ 21:10) (104/72 - 153/77)  RR: 18 (10-03-19 @ 21:10) (18 - 20)  SpO2: 98% (10-04-19 @ 00:08) (95% - 100%)  Wt(kg): --    I&O's Summary    02 Oct 2019 07:01  -  03 Oct 2019 07:00  --------------------------------------------------------  IN: 1000 mL / OUT: 0 mL / NET: 1000 mL    03 Oct 2019 07:01  -  04 Oct 2019 04:47  --------------------------------------------------------  IN: 2220 mL / OUT: 5110 mL / NET: -2890 mL        Appearance: Normal	  HEENT:   Normal oral mucosa, PERRL, EOMI	  Lymphatic: No lymphadenopathy , no edema  Cardio: RRR, no murmurs  Respiratory: Lungs clear to auscultation, normal effort 	  Gastrointestinal:  Soft, Non-tender, + BS	  Skin: No rashes, No ecchymoses, No cyanosis, warm to touch  Psychiatry:  Mood & affect appropriate    A/P) He is a pleasant 68 y/o male PMH ESRD on HD and CAD s/p multiple PCIs from 5797-2560. He now is s/p a CABG, and had post-op AF. Review of all EKGs demonstrates no evidence of pre-existing AF. His LVEF remains normal. He denies palpitation, syncope, nor angina.    -agree with 4-6 weeks of coumadin/amio  -shouldn't need a/c long term as AF was only post-op  -no further inpatient EP workup needed

## 2019-10-04 NOTE — PROGRESS NOTE ADULT - ASSESSMENT
69 yr old male s/p C3l with PMHx: ESRD, CoRS stents, hyperthyroid, DM, CVA, MI  preop endo consult for Hyperthyroid and hyperglycemia  post op afib - amio load  9/28 Coumadin dosing ,  afib 110 bp stable amio load and Lopressor 25 q 8, confused-chair alarm  dc percocet  9/29 VSS - LLL diminished - chest pt given - spirometry encouraged oob - o2 weened off - will ck o2 sat. INR 1.53 5mg Coumadin given  d/c planning home Tues?  9/30 VSS; INR 1.93 coumadin 3 mg,  02 sats  86 percent room air  + rhonchi  on nebs, forgetful no narcotics wbc 16 afebrile; urinalysis     10/1 Chest xray  atelectasis vs pl effusion B/L,   on bipap this am  now tolerating 3 Liters NC,    OOB to chair,   HD this am,  now in NSR  10/2 SR, Large left pleural effusion to be tapped when INR <1.8--FFP if INR still elevated in am.  HD as per renal--fluid overload--renal will dialyze tomorrow and increase UF  10/3  VSS stable.  INR 2.1 --2 FFP with HD recheck INR and perform left thoracentesis if INR <2.  HD today.  Continue to hold coumadin as patient is in SR  10/4 VVS; HD today for UF as per Renal.  Maintain Left pigtail --> LWS. CXR in AM.  HD in AM and plan to D/C home with PT post HD per Dr. Vasquez.

## 2019-10-04 NOTE — PROGRESS NOTE ADULT - ASSESSMENT
69 y.o. male with pmh of ESRD( HD on tu/Th/sa) at [facility] via LUE AVF. Last dialyzed yesterday 9/14, was sent to [facility] for cath after presenting with SOB and a positive stress tests/p CABG 9/23/19 with new onset afib ;Renal following for ESRD Mx.    labs, chart reviewed

## 2019-10-04 NOTE — PROGRESS NOTE ADULT - SUBJECTIVE AND OBJECTIVE BOX
S: no chest pain or sob; Review of systems otherwise (-)      acetaminophen   Tablet .. 650 milliGRAM(s) Oral every 6 hours PRN  ALBUTerol/ipratropium for Nebulization 3 milliLiter(s) Nebulizer every 6 hours  amiodarone    Tablet 200 milliGRAM(s) Oral daily  amiodarone    Tablet   Oral   aspirin enteric coated 81 milliGRAM(s) Oral daily  atorvastatin 40 milliGRAM(s) Oral at bedtime  dextrose 40% Gel 15 Gram(s) Oral once PRN  dextrose 5%. 1000 milliLiter(s) IV Continuous <Continuous>  dextrose 50% Injectable 12.5 Gram(s) IV Push once  dextrose 50% Injectable 25 Gram(s) IV Push once  dextrose 50% Injectable 25 Gram(s) IV Push once  docusate sodium 100 milliGRAM(s) Oral three times a day  famotidine    Tablet 20 milliGRAM(s) Oral at bedtime  glucagon  Injectable 1 milliGRAM(s) IntraMuscular once PRN  insulin glargine Injectable (LANTUS) 14 Unit(s) SubCutaneous at bedtime  insulin lispro (HumaLOG) corrective regimen sliding scale   SubCutaneous three times a day before meals  insulin lispro (HumaLOG) corrective regimen sliding scale   SubCutaneous at bedtime  insulin lispro Injectable (HumaLOG) 5 Unit(s) SubCutaneous three times a day before meals  ketotifen 0.025% Ophthalmic Solution 1 Drop(s) Both EYES two times a day  methimazole 5 milliGRAM(s) Oral daily  metoprolol tartrate 50 milliGRAM(s) Oral every 8 hours  sevelamer carbonate 800 milliGRAM(s) Oral three times a day with meals  sodium chloride 0.9% lock flush 3 milliLiter(s) IV Push every 8 hours  tiotropium 18 MICROgram(s) Capsule 1 Capsule(s) Inhalation daily                            8.8    13.02 )-----------( 278      ( 04 Oct 2019 07:08 )             28.3       10-    134<L>  |  91<L>  |  58<H>  ----------------------------<  82  3.7   |  28  |  7.68<H>    Ca    8.6      04 Oct 2019 07:08              T(C): 36.7 (10-04-19 @ 12:57), Max: 37 (10-04-19 @ 05:01)  HR: 60 (10-04-19 @ 12:57) (60 - 112)  BP: 125/69 (10-04-19 @ 12:57) (107/74 - 167/72)  RR: 18 (10-04-19 @ 12:57) (18 - 20)  SpO2: 98% (10-04-19 @ 12:57) (95% - 100%)  Wt(kg): --    I&O's Summary    03 Oct 2019 07:  -  04 Oct 2019 07:00  --------------------------------------------------------  IN: 2340 mL / OUT: 5110 mL / NET: -2770 mL    04 Oct 2019 07:  -  04 Oct 2019 17:41  --------------------------------------------------------  IN: 720 mL / OUT: 0 mL / NET: 720 mL          Gen: Appears well in NAD  HEENT:  (-)icterus (-)pallor  CV: N S1 S2 1/6 DEJAH (+)2 Pulses B/l  Resp: decreased bs at bases, normal effort  GI: (+) BS Soft, NT, ND  Lymph:  (+) B/L LE Edema, (-)obvious lymphadenopathy  Skin: Warm to touch, Normal turgor  Psych: Appropriate mood and affect      TELEMETRY: SR      < from: Cardiac Cath Lab - Adult (19 @ 12:28) >  PROCEDURE:  --  Left heart catheterization.  --  Left coronary angiography.  --  Right coronary angiography.  --  Sonosite - Diagnostic.  VENTRICLES: No LV gram was performed; however, a recent echocardiogram  demonstrated normal global and regional LV function.  CORONARY VESSELS: The coronary circulation is right dominant.  LM:   --  Ostial LM: There was a discrete 90 % stenosis.  LAD:   --  Mid LAD: Angiography showed minor luminal irregularities with no  flow limiting lesions. The stent in the mid LAD is patent.  --  Distal LAD: The vessel was small sized. Angiography showed moderate  atherosclerosis.  CX:   --  Mid circumflex: There was a tubular 50 % stenosis at the site of  a prior stent.  RI:   --  Proximal ramus intermedius: There was a diffuse 50 % stenosis at  the site of a prior stent.  RCA:   --  Mid RCA: There was a tubular 50 % stenosis at the site of a  prior stent.  --  RPLS: Angiography showed minor luminal irregularities with no flow  limiting lesions.  COMPLICATIONS: There were no complications.  DIAGNOSTIC RECOMMENDATIONS: Consultation with a cardiac surgeon be obtained  for surgical opinion and coronary artery bypass grafting.  Prepared and signed by  Jessy Méndez M.D.  Signed 2019 13:42:23  HEMODYNAMIC TABLES  Pressures:  NO PHASE  Pressures:  - HR: 69  Pressures:  - Rhythm:  Pressures:  -- Aortic Pressure (S/D/M): 170/70/113  Pressures:  -- Left Ventricle (s/edp): 164/28/--  Outputs:  NO PHASE  Outputs:  -- CALCULATIONS[age]  Outputs:  -- CALCULATIONS: Body Surface Area: 1.92  Outputs:  -- CALCULATIONS: Height in cm: 168.00  Outputs:  -- CALCULATIONS: Sex: Male  Outputs:  -- CALCULATIONS: Weight in k.60  Outputs:  -- OUTPUTS: O2 consumption: 239.38  Outputs:  -- OUTPUTS: Vo2 Indexed: 125.00  < end of copied text >      ASSESSMENT/PLAN: 70 y/o male with PMHx of CAD, HTN, HLD, T2DM, and ESRD on HD presented after abnormal stress test to [facility] for elective cardiac catheterization. Patient admitted post cath after cath revealed severe ostial LM disease. Now transferred to [facility] for CABG. S/p C3L on . Patient developed new onset Afib on .  	   - EP f/u appreciated - 4-6 weeks of coumadin and Amio  - pt. s/p chest tube - ac temporarily on hold - restart when ok with cts   - Continue ASA, Statin  - continue with fluid removal with HD  - follow up CT surgery    Kalie Lau MD

## 2019-10-04 NOTE — PROGRESS NOTE ADULT - SUBJECTIVE AND OBJECTIVE BOX
Chief complaint  Patient is a 69y old  Male who presents with a chief complaint of transferred for work up (02 Oct 2019 13:47)   Review of systems  Patient in bed, looks comfortable, no fever, no hypoglycemia.    Labs and Fingersticks  CAPILLARY BLOOD GLUCOSE      POCT Blood Glucose.: 154 mg/dL (04 Oct 2019 12:34)  POCT Blood Glucose.: 164 mg/dL (04 Oct 2019 07:46)  POCT Blood Glucose.: 140 mg/dL (03 Oct 2019 21:37)  POCT Blood Glucose.: 72 mg/dL (03 Oct 2019 16:56)      Anion Gap, Serum: 15 (10-04 @ 07:08)  Anion Gap, Serum: 21 <H> (10-03 @ 04:53)      Calcium, Total Serum: 8.6 (10-04 @ 07:08)  Calcium, Total Serum: 8.5 (10-03 @ 04:53)          10-04    134<L>  |  91<L>  |  58<H>  ----------------------------<  82  3.7   |  28  |  7.68<H>    Ca    8.6      04 Oct 2019 07:08                          8.8    13.02 )-----------( 278      ( 04 Oct 2019 07:08 )             28.3     Medications  MEDICATIONS  (STANDING):  ALBUTerol/ipratropium for Nebulization 3 milliLiter(s) Nebulizer every 6 hours  amiodarone    Tablet 200 milliGRAM(s) Oral daily  amiodarone    Tablet   Oral   aspirin enteric coated 81 milliGRAM(s) Oral daily  atorvastatin 40 milliGRAM(s) Oral at bedtime  dextrose 5%. 1000 milliLiter(s) (50 mL/Hr) IV Continuous <Continuous>  dextrose 50% Injectable 12.5 Gram(s) IV Push once  dextrose 50% Injectable 25 Gram(s) IV Push once  dextrose 50% Injectable 25 Gram(s) IV Push once  docusate sodium 100 milliGRAM(s) Oral three times a day  famotidine    Tablet 20 milliGRAM(s) Oral at bedtime  insulin glargine Injectable (LANTUS) 14 Unit(s) SubCutaneous at bedtime  insulin lispro (HumaLOG) corrective regimen sliding scale   SubCutaneous three times a day before meals  insulin lispro (HumaLOG) corrective regimen sliding scale   SubCutaneous at bedtime  insulin lispro Injectable (HumaLOG) 5 Unit(s) SubCutaneous three times a day before meals  ketotifen 0.025% Ophthalmic Solution 1 Drop(s) Both EYES two times a day  methimazole 5 milliGRAM(s) Oral daily  metoprolol tartrate 50 milliGRAM(s) Oral every 8 hours  sevelamer carbonate 800 milliGRAM(s) Oral three times a day with meals  sodium chloride 0.9% lock flush 3 milliLiter(s) IV Push every 8 hours  tiotropium 18 MICROgram(s) Capsule 1 Capsule(s) Inhalation daily      Physical Exam  General: Patient comfortable in bed  Vital Signs Last 12 Hrs  T(F): 98.6 (10-04-19 @ 05:01), Max: 98.6 (10-04-19 @ 05:01)  HR: 70 (10-04-19 @ 07:19) (69 - 70)  BP: 167/72 (10-04-19 @ 05:01) (167/72 - 167/72)  BP(mean): --  RR: 18 (10-04-19 @ 05:01) (18 - 18)  SpO2: 98% (10-04-19 @ 07:19) (96% - 98%)  Neck: No palpable thyroid nodules.  CVS: S1S2, No murmurs  Respiratory: No wheezing, no crepitations  GI: Abdomen soft, bowel sounds positive  Musculoskeletal:  edema lower extremities.   Skin: No skin rashes, no ecchymosis    Diagnostics Chief complaint  Patient is a 69y old  Male who presents with a chief complaint  of transferred for work up (02 Oct 2019 13:47)   Review of systems  Patient in bed, looks comfortable, no fever, no hypoglycemia.    Labs and Fingersticks  CAPILLARY BLOOD GLUCOSE      POCT Blood Glucose.: 154 mg/dL (04 Oct 2019 12:34)  POCT Blood Glucose.: 164 mg/dL (04 Oct 2019 07:46)  POCT Blood Glucose.: 140 mg/dL (03 Oct 2019 21:37)  POCT Blood Glucose.: 72 mg/dL (03 Oct 2019 16:56)      Anion Gap, Serum: 15 (10-04 @ 07:08)  Anion Gap, Serum: 21 <H> (10-03 @ 04:53)      Calcium, Total Serum: 8.6 (10-04 @ 07:08)  Calcium, Total Serum: 8.5 (10-03 @ 04:53)          10-04    134<L>  |  91<L>  |  58<H>  ----------------------------<  82  3.7   |  28  |  7.68<H>    Ca    8.6      04 Oct 2019 07:08                          8.8    13.02 )-----------( 278      ( 04 Oct 2019 07:08 )             28.3     Medications  MEDICATIONS  (STANDING):  ALBUTerol/ipratropium for Nebulization 3 milliLiter(s) Nebulizer every 6 hours  amiodarone    Tablet 200 milliGRAM(s) Oral daily  amiodarone    Tablet   Oral   aspirin enteric coated 81 milliGRAM(s) Oral daily  atorvastatin 40 milliGRAM(s) Oral at bedtime  dextrose 5%. 1000 milliLiter(s) (50 mL/Hr) IV Continuous <Continuous>  dextrose 50% Injectable 12.5 Gram(s) IV Push once  dextrose 50% Injectable 25 Gram(s) IV Push once  dextrose 50% Injectable 25 Gram(s) IV Push once  docusate sodium 100 milliGRAM(s) Oral three times a day  famotidine    Tablet 20 milliGRAM(s) Oral at bedtime  insulin glargine Injectable (LANTUS) 14 Unit(s) SubCutaneous at bedtime  insulin lispro (HumaLOG) corrective regimen sliding scale   SubCutaneous three times a day before meals  insulin lispro (HumaLOG) corrective regimen sliding scale   SubCutaneous at bedtime  insulin lispro Injectable (HumaLOG) 5 Unit(s) SubCutaneous three times a day before meals  ketotifen 0.025% Ophthalmic Solution 1 Drop(s) Both EYES two times a day  methimazole 5 milliGRAM(s) Oral daily  metoprolol tartrate 50 milliGRAM(s) Oral every 8 hours  sevelamer carbonate 800 milliGRAM(s) Oral three times a day with meals  sodium chloride 0.9% lock flush 3 milliLiter(s) IV Push every 8 hours  tiotropium 18 MICROgram(s) Capsule 1 Capsule(s) Inhalation daily      Physical Exam  General: Patient comfortable in bed  Vital Signs Last 12 Hrs  T(F): 98.6 (10-04-19 @ 05:01), Max: 98.6 (10-04-19 @ 05:01)  HR: 70 (10-04-19 @ 07:19) (69 - 70)  BP: 167/72 (10-04-19 @ 05:01) (167/72 - 167/72)  BP(mean): --  RR: 18 (10-04-19 @ 05:01) (18 - 18)  SpO2: 98% (10-04-19 @ 07:19) (96% - 98%)  Neck: No palpable thyroid nodules.  CVS: S1S2, No murmurs  Respiratory: No wheezing, no crepitations  GI: Abdomen soft, bowel sounds positive  Musculoskeletal:  edema lower extremities.   Skin: No skin rashes, no ecchymosis    Diagnostics

## 2019-10-04 NOTE — PROGRESS NOTE ADULT - SUBJECTIVE AND OBJECTIVE BOX
[facility] - NII Knight / NII Mendiola / JASWANT Arroyo/ NII Mackey/ NII Macias/ BRIGITTE Esposito / ARAMIS Calle / LUDA Boewn  ---------------------------------------------------------------------------------------------------------------  seen and examined today for ESRD on HD  Interval : S/P Chest drain  VITALS:  T(F): 98 (10-04-19 @ 12:57), Max: 98.6 (10-04-19 @ 05:01)  HR: 60 (10-04-19 @ 12:57)  BP: 125/69 (10-04-19 @ 12:57)  RR: 18 (10-04-19 @ 12:57)  SpO2: 98% (10-04-19 @ 12:57)  Wt(kg): --    10-03 @ 07:01  -  10-04 @ 07:00  --------------------------------------------------------  IN: 2340 mL / OUT: 5110 mL / NET: -2770 mL    10-04 @ 07:01  -  10-04 @ 14:30  --------------------------------------------------------  IN: 360 mL / OUT: 0 mL / NET: 360 mL      Physical Exam :-  Constitutional: NAD  Neck: Supple.  Respiratory: Bilateral equal breath sounds, coarse sounds, chest tube +  Cardiovascular: S1, S2 normal,  Gastrointestinal: Bowel Sounds present, soft, non tender.  Extremities: 2+ edema  Neurological: Alert and Oriented x 3, no focal deficits  Psychiatric: Normal mood, normal affect  Data:-  Allergies :   lisinopril (Other)    Hospital Medications:   MEDICATIONS  (STANDING):  ALBUTerol/ipratropium for Nebulization 3 milliLiter(s) Nebulizer every 6 hours  amiodarone    Tablet 200 milliGRAM(s) Oral daily  amiodarone    Tablet   Oral   aspirin enteric coated 81 milliGRAM(s) Oral daily  atorvastatin 40 milliGRAM(s) Oral at bedtime  dextrose 5%. 1000 milliLiter(s) (50 mL/Hr) IV Continuous <Continuous>  dextrose 50% Injectable 12.5 Gram(s) IV Push once  dextrose 50% Injectable 25 Gram(s) IV Push once  dextrose 50% Injectable 25 Gram(s) IV Push once  docusate sodium 100 milliGRAM(s) Oral three times a day  famotidine    Tablet 20 milliGRAM(s) Oral at bedtime  insulin glargine Injectable (LANTUS) 14 Unit(s) SubCutaneous at bedtime  insulin lispro (HumaLOG) corrective regimen sliding scale   SubCutaneous three times a day before meals  insulin lispro (HumaLOG) corrective regimen sliding scale   SubCutaneous at bedtime  insulin lispro Injectable (HumaLOG) 5 Unit(s) SubCutaneous three times a day before meals  ketotifen 0.025% Ophthalmic Solution 1 Drop(s) Both EYES two times a day  methimazole 5 milliGRAM(s) Oral daily  metoprolol tartrate 50 milliGRAM(s) Oral every 8 hours  sevelamer carbonate 800 milliGRAM(s) Oral three times a day with meals  sodium chloride 0.9% lock flush 3 milliLiter(s) IV Push every 8 hours  tiotropium 18 MICROgram(s) Capsule 1 Capsule(s) Inhalation daily    10-04    134<L>  |  91<L>  |  58<H>  ----------------------------<  82  3.7   |  28  |  7.68<H>    Ca    8.6      04 Oct 2019 07:08      Creatinine Trend: 7.68 <--, 9.01 <--, 7.42 <--, 9.14 <--, 7.54 <--, 6.09 <--, 6.08 <--                        8.8    13.02 )-----------( 278      ( 04 Oct 2019 07:08 )             28.3

## 2019-10-04 NOTE — PROGRESS NOTE ADULT - PROBLEM SELECTOR PLAN 1
Continue with ASA 81 mg PO Daily.   Continue with Lopressor 50 mg PO TID.   Continue with Statin   Maintain PW --> Isolated   Maintain Left pigtail cath --> pleural vac to LWS   CXR in AM   Increase activity as tolerated.   Encourage Chest PT / Pulmonary toileting and Incentive spirometry every 1 hour x 10 while awake.   Continue with PUD and DVT prophylaxis.   Shower on POD #5.   D/C plan home PT once medically cleared   Plan of care discussed with attending

## 2019-10-04 NOTE — PROGRESS NOTE ADULT - PROBLEM SELECTOR PLAN 1
Still very volume overloaed, S/P chest tube with 1.3L drain  scheduled for UF today and HD tomorrow with 2-3.5L each day of UF  Will continue to have HD with high UF, likely loosing weight from cardiac cachexia  trend bmp  hyponatremia, unrestricted sodium diet for now

## 2019-10-04 NOTE — PROGRESS NOTE ADULT - SUBJECTIVE AND OBJECTIVE BOX
VITAL SIGNS    Subjective: "I'm doing ok." Denies CP, palpitation, SOB, HENLEY, HA, dizziness, N/V/D, fever or chills.  No acute event noted overnight.     Telemetry: NSR / Afib 50-70     Vital Signs Last 24 Hrs  T(C): 36.9 (10-04-19 @ 18:00), Max: 37 (10-04-19 @ 05:01)  T(F): 98.5 (10-04-19 @ 18:00), Max: 98.6 (10-04-19 @ 05:01)  HR: 58 (10-04-19 @ 18:00) (58 - 112)  BP: 117/55 (10-04-19 @ 18:00) (107/74 - 167/72)  RR: 18 (10-04-19 @ 18:00) (18 - 20)  SpO2: 98% (10-04-19 @ 18:00) (95% - 100%)           10-03 @ 07:01  -  10-04 @ 07:00  --------------------------------------------------------  IN: 2340 mL / OUT: 5110 mL / NET: -2770 mL    10-04 @ 07:01  -  10-04 @ 18:54  --------------------------------------------------------  IN: 720 mL / OUT: 0 mL / NET: 720 mL    Daily     Daily Weight in k (04 Oct 2019 18:00)    CAPILLARY BLOOD GLUCOSE    POCT Blood Glucose.: 89 mg/dL (04 Oct 2019 17:03)  POCT Blood Glucose.: 154 mg/dL (04 Oct 2019 12:34)  POCT Blood Glucose.: 164 mg/dL (04 Oct 2019 07:46)  POCT Blood Glucose.: 140 mg/dL (03 Oct 2019 21:37)        PHYSICAL EXAM    Neurology: alert and oriented x 3, nonfocal, no gross deficits    CV: (+) S1 and S2, No murmurs, rubs, gallops or clicks     Sternal Wound:  MSI -->CDI , sternum stable; Left pigtail cath --> pleural vac --> LWS. Maintain pleural vac --> LWS     Lungs: CTA B/L     Abdomen: soft, nontender, nondistended, positive bowel sounds, (+) Flatus; (+) BM     :  Voiding               Extremities:  B/L LE (+) edema; negative calf tenderness; (+) 2 DP palpable        acetaminophen Tablet .. 650 milliGRAM(s) Oral every 6 hours PRN  ALBUTerol/ipratropium for Nebulization 3 milliLiter(s) Nebulizer every 6 hours  amiodarone Tablet 200 milliGRAM(s) Oral daily  amiodarone Tablet Oral   aspirin enteric coated 81 milliGRAM(s) Oral daily  atorvastatin 40 milliGRAM(s) Oral at bedtime  docusate sodium 100 milliGRAM(s) Oral three times a day  famotidine Tablet 20 milliGRAM(s) Oral at bedtime  glucagon  Injectable 1 milliGRAM(s) IntraMuscular once PRN  insulin glargine Injectable (LANTUS) 14 Unit(s) SubCutaneous at bedtime  insulin lispro (HumaLOG) corrective regimen sliding scale   SubCutaneous three times a day before meals  insulin lispro (HumaLOG) corrective regimen sliding scale   SubCutaneous at bedtime  insulin lispro Injectable (HumaLOG) 5 Unit(s) SubCutaneous three times a day before meals  ketotifen 0.025% Ophthalmic Solution 1 Drop(s) Both EYES two times a day  methimazole 5 milliGRAM(s) Oral daily  metoprolol tartrate 50 milliGRAM(s) Oral every 8 hours  sevelamer carbonate 800 milliGRAM(s) Oral three times a day with meals  sodium chloride 0.9% lock flush 3 milliLiter(s) IV Push every 8 hours  tiotropium 18 MICROgram(s) Capsule 1 Capsule(s) Inhalation daily    Physical Therapy Rec:   Home  [  ]   Home w/ PT  [ X ]  Rehab  [  ]    Discussed with Cardiothoracic Team at AM rounds.

## 2019-10-04 NOTE — PROGRESS NOTE ADULT - ASSESSMENT
Assessment  DMT2: 69y Male with DM T2 with hyperglycemia, was on oral meds at home, now on low dose insulin, FS in acceptable range, no new hypoglycemic episode eating partial meals, appears comfortable, planning d/c tomorrow after HD.  CAD: Postop CABG 9/23, on medications, no chest pain, stable, monitored.  HTN: Controlled, on antihypertensive medications.  Hyperthyroidism: On low dose Methimazole. New TFTs WNL: TSH 1.48, Free T4 1.3.  ESRD: HD planned tomorrow. Monitor labs/BMP            Yg Mackey MD  Cell: [phone]  Office: [phone] Assessment  DMT2: 69y Male with DM T2 with hyperglycemia, was on oral meds at home, now on low dose insulin, FS in acceptable range, no new hypoglycemic episode eating partial meals, appears comfortable, planning d/c tomorrow after HD.  CAD: Postop CABG 9/23, on medications, no chest pain, stable, monitored.  HTN: Controlled, on antihypertensive medications.  Hyperthyroidism: On low dose Methimazole. New  TFTs WNL: TSH 1.48, Free T4 1.3.  ESRD: HD planned tomorrow. Monitor labs/BMP            Yg Mackey MD  Cell: [phone]  Office: [phone]

## 2019-10-04 NOTE — PROGRESS NOTE ADULT - PROBLEM SELECTOR PLAN 1
Will start patient on 5u Humalog before meals, continue Lantus and coverage scale. Will continue monitoring FS, log, will Follow up.  Suggest to d/c on current hospital insulin regimen and FU endocrinologist within 4 weeks.  Patient counseled for compliance with consistent low carb diet.

## 2019-10-05 LAB
ANION GAP SERPL CALC-SCNC: 20 MMOL/L — HIGH (ref 5–17)
BUN SERPL-MCNC: 70 MG/DL — HIGH (ref 7–23)
CALCIUM SERPL-MCNC: 8.1 MG/DL — LOW (ref 8.4–10.5)
CHLORIDE SERPL-SCNC: 90 MMOL/L — LOW (ref 96–108)
CO2 SERPL-SCNC: 22 MMOL/L — SIGNIFICANT CHANGE UP (ref 22–31)
CREAT SERPL-MCNC: 9.03 MG/DL — HIGH (ref 0.5–1.3)
GLUCOSE BLDC GLUCOMTR-MCNC: 101 MG/DL — HIGH (ref 70–99)
GLUCOSE BLDC GLUCOMTR-MCNC: 134 MG/DL — HIGH (ref 70–99)
GLUCOSE BLDC GLUCOMTR-MCNC: 135 MG/DL — HIGH (ref 70–99)
GLUCOSE BLDC GLUCOMTR-MCNC: 162 MG/DL — HIGH (ref 70–99)
GLUCOSE SERPL-MCNC: 109 MG/DL — HIGH (ref 70–99)
HCT VFR BLD CALC: 28.9 % — LOW (ref 39–50)
HGB BLD-MCNC: 9.1 G/DL — LOW (ref 13–17)
INR BLD: 1.74 RATIO — HIGH (ref 0.88–1.16)
MCHC RBC-ENTMCNC: 28.5 PG — SIGNIFICANT CHANGE UP (ref 27–34)
MCHC RBC-ENTMCNC: 31.5 GM/DL — LOW (ref 32–36)
MCV RBC AUTO: 90.6 FL — SIGNIFICANT CHANGE UP (ref 80–100)
NRBC # BLD: 0 /100 WBCS — SIGNIFICANT CHANGE UP (ref 0–0)
PLATELET # BLD AUTO: 265 K/UL — SIGNIFICANT CHANGE UP (ref 150–400)
POTASSIUM SERPL-MCNC: 4.1 MMOL/L — SIGNIFICANT CHANGE UP (ref 3.5–5.3)
POTASSIUM SERPL-SCNC: 4.1 MMOL/L — SIGNIFICANT CHANGE UP (ref 3.5–5.3)
PROTHROM AB SERPL-ACNC: 20.4 SEC — HIGH (ref 10–12.9)
RBC # BLD: 3.19 M/UL — LOW (ref 4.2–5.8)
RBC # FLD: 13.8 % — SIGNIFICANT CHANGE UP (ref 10.3–14.5)
SODIUM SERPL-SCNC: 132 MMOL/L — LOW (ref 135–145)
WBC # BLD: 14.58 K/UL — HIGH (ref 3.8–10.5)
WBC # FLD AUTO: 14.58 K/UL — HIGH (ref 3.8–10.5)

## 2019-10-05 PROCEDURE — 71045 X-RAY EXAM CHEST 1 VIEW: CPT | Mod: 26

## 2019-10-05 RX ORDER — WARFARIN SODIUM 2.5 MG/1
5 TABLET ORAL ONCE
Refills: 0 | Status: COMPLETED | OUTPATIENT
Start: 2019-10-05 | End: 2019-10-05

## 2019-10-05 RX ORDER — METOPROLOL TARTRATE 50 MG
50 TABLET ORAL EVERY 6 HOURS
Refills: 0 | Status: DISCONTINUED | OUTPATIENT
Start: 2019-10-05 | End: 2019-10-06

## 2019-10-05 RX ADMIN — KETOTIFEN FUMARATE 1 DROP(S): 0.34 SOLUTION OPHTHALMIC at 17:42

## 2019-10-05 RX ADMIN — INSULIN GLARGINE 14 UNIT(S): 100 INJECTION, SOLUTION SUBCUTANEOUS at 22:36

## 2019-10-05 RX ADMIN — Medication 3 MILLILITER(S): at 23:59

## 2019-10-05 RX ADMIN — Medication 100 MILLIGRAM(S): at 05:45

## 2019-10-05 RX ADMIN — SODIUM CHLORIDE 3 MILLILITER(S): 9 INJECTION INTRAMUSCULAR; INTRAVENOUS; SUBCUTANEOUS at 14:38

## 2019-10-05 RX ADMIN — SEVELAMER CARBONATE 800 MILLIGRAM(S): 2400 POWDER, FOR SUSPENSION ORAL at 07:55

## 2019-10-05 RX ADMIN — ATORVASTATIN CALCIUM 40 MILLIGRAM(S): 80 TABLET, FILM COATED ORAL at 21:25

## 2019-10-05 RX ADMIN — SODIUM CHLORIDE 3 MILLILITER(S): 9 INJECTION INTRAMUSCULAR; INTRAVENOUS; SUBCUTANEOUS at 05:43

## 2019-10-05 RX ADMIN — Medication 50 MILLIGRAM(S): at 05:45

## 2019-10-05 RX ADMIN — Medication 650 MILLIGRAM(S): at 01:53

## 2019-10-05 RX ADMIN — Medication 650 MILLIGRAM(S): at 01:23

## 2019-10-05 RX ADMIN — WARFARIN SODIUM 5 MILLIGRAM(S): 2.5 TABLET ORAL at 21:25

## 2019-10-05 RX ADMIN — Medication 3 MILLILITER(S): at 17:42

## 2019-10-05 RX ADMIN — Medication 100 MILLIGRAM(S): at 21:25

## 2019-10-05 RX ADMIN — Medication 650 MILLIGRAM(S): at 07:55

## 2019-10-05 RX ADMIN — Medication 3 MILLILITER(S): at 05:45

## 2019-10-05 RX ADMIN — FAMOTIDINE 20 MILLIGRAM(S): 10 INJECTION INTRAVENOUS at 21:25

## 2019-10-05 RX ADMIN — Medication 5 UNIT(S): at 17:41

## 2019-10-05 RX ADMIN — Medication 650 MILLIGRAM(S): at 08:25

## 2019-10-05 RX ADMIN — Medication 100 MILLIGRAM(S): at 14:37

## 2019-10-05 RX ADMIN — Medication 3 MILLILITER(S): at 13:49

## 2019-10-05 RX ADMIN — SEVELAMER CARBONATE 800 MILLIGRAM(S): 2400 POWDER, FOR SUSPENSION ORAL at 17:42

## 2019-10-05 RX ADMIN — Medication 50 MILLIGRAM(S): at 23:59

## 2019-10-05 RX ADMIN — Medication 50 MILLIGRAM(S): at 14:36

## 2019-10-05 RX ADMIN — Medication 5 UNIT(S): at 07:54

## 2019-10-05 RX ADMIN — Medication 1: at 07:54

## 2019-10-05 RX ADMIN — AMIODARONE HYDROCHLORIDE 200 MILLIGRAM(S): 400 TABLET ORAL at 05:45

## 2019-10-05 RX ADMIN — Medication 50 MILLIGRAM(S): at 17:42

## 2019-10-05 RX ADMIN — Medication 81 MILLIGRAM(S): at 14:37

## 2019-10-05 RX ADMIN — KETOTIFEN FUMARATE 1 DROP(S): 0.34 SOLUTION OPHTHALMIC at 05:46

## 2019-10-05 RX ADMIN — SODIUM CHLORIDE 3 MILLILITER(S): 9 INJECTION INTRAMUSCULAR; INTRAVENOUS; SUBCUTANEOUS at 22:41

## 2019-10-05 NOTE — PROGRESS NOTE ADULT - SUBJECTIVE AND OBJECTIVE BOX
VITAL SIGNS    Subjective: "I'm feeling SOB." Denies CP, palpitation, SOB, HENLEY, HA, dizziness, N/V/D, fever or chills.  No acute event noted overnight.     Telemetry: NSR 50-70     Vital Signs Last 24 Hrs  T(C): 36.4 (10-05-19 @ 15:20), Max: 36.9 (10-04-19 @ 18:00)  T(F): 97.6 (10-05-19 @ 15:20), Max: 98.5 (10-04-19 @ 18:00)  HR: 104 (10-05-19 @ 15:20) (54 - 104)  BP: 109/67 (10-05-19 @ 15:20) (101/57 - 142/66)  RR: 18 (10-05-19 @ 15:20) (16 - 20)  SpO2: 99% (10-05-19 @ 15:20) (95% - 100%)           10-04 @ 07:01  -  10-05 @ 07:00  --------------------------------------------------------  IN: 1660 mL / OUT: 3240 mL / NET: -1580 mL    10-05 @ 07:01  -  10-05 @ 16:16  --------------------------------------------------------  IN: 1050 mL / OUT: 3300 mL / NET: -2250 mL    Daily     Daily Weight in k.9 (05 Oct 2019 15:20)    CAPILLARY BLOOD GLUCOSE    POCT Blood Glucose.: 134 mg/dL (05 Oct 2019 13:46)  POCT Blood Glucose.: 162 mg/dL (05 Oct 2019 07:41)  POCT Blood Glucose.: 108 mg/dL (04 Oct 2019 21:45)  POCT Blood Glucose.: 89 mg/dL (04 Oct 2019 17:03)        PHYSICAL EXAM    Neurology: alert and oriented x 3, nonfocal, no gross deficits    CV: (+) S1 and S2, No murmurs, rubs, gallops or clicks     Sternal Wound:  MSI -->CDI , sternum stable; Left posterior pigtail cath --> pleural vac --> LWS; PW x 4 --> Isolated     Lungs: Diminished at base B/L     Abdomen: soft, nontender, nondistended, positive bowel sounds, (+) Flatus; (+) BM     : Bladder non tender / non distended                Extremities:  B/L LE (+) 1 edema; negative calf tenderness; (+) 2 DP palpable; LUE AV fistula --> (+) Bruit; (+) Thrills.        acetaminophen Tablet .. 650 milliGRAM(s) Oral every 6 hours PRN  ALBUTerol/ipratropium for Nebulization 3 milliLiter(s) Nebulizer every 6 hours  amiodarone  Tablet 200 milliGRAM(s) Oral daily  amiodarone  Tablet   Oral   aspirin enteric coated 81 milliGRAM(s) Oral daily  atorvastatin 40 milliGRAM(s) Oral at bedtime  docusate sodium 100 milliGRAM(s) Oral three times a day  famotidine Tablet 20 milliGRAM(s) Oral at bedtime  glucagon  Injectable 1 milliGRAM(s) IntraMuscular once PRN  insulin glargine Injectable (LANTUS) 14 Unit(s) SubCutaneous at bedtime  insulin lispro (HumaLOG) corrective regimen sliding scale   SubCutaneous three times a day before meals  insulin lispro (HumaLOG) corrective regimen sliding scale   SubCutaneous at bedtime  insulin lispro Injectable (HumaLOG) 5 Unit(s) SubCutaneous three times a day before meals  ketotifen 0.025% Ophthalmic Solution 1 Drop(s) Both EYES two times a day  methimazole 5 milliGRAM(s) Oral daily  metoprolol tartrate 50 milliGRAM(s) Oral every 8 hours  sevelamer carbonate 800 milliGRAM(s) Oral three times a day with meals  sodium chloride 0.9% lock flush 3 milliLiter(s) IV Push every 8 hours  tiotropium 18 MICROgram(s) Capsule 1 Capsule(s) Inhalation daily    Physical Therapy Rec:   Home  [  ]   Home w/ PT  [ X ]  Rehab  [  ]    Discussed with Cardiothoracic Team at AM rounds.

## 2019-10-05 NOTE — PROGRESS NOTE ADULT - ASSESSMENT
69 y.o. male with pmh of ESRD( HD on tu/Th/sa) at [facility] via LUE AVF. Last dialyzed yesterday 9/14, was sent to [facility] for cath after presenting with SOB and a positive stress tests/p CABG 9/23/19 with new onset afib ;Renal following for ESRD Mx.    labs, chart reviewed

## 2019-10-05 NOTE — PROVIDER CONTACT NOTE (OTHER) - SITUATION
Pt complains of left anterior chest pain of 4 out of 10
HR 40 for 10 second while sitting on the chair
Pt converted  form AFIB to AFlutter
Pt is a 70 y/o M s/p C4 on 9/23. Pt with a history diabetes, now with a blood sugar of 302.
Rn notified provider of increasing K+. Pt last abg shows K+ of 6.0
patient converted to Afib during dialysis

## 2019-10-05 NOTE — PROGRESS NOTE ADULT - SUBJECTIVE AND OBJECTIVE BOX
Chief complaint  Patient is a 69y old  Male who presents with a chief complaint of Cardiac Surgery (04 Oct 2019 18:54)   Review of systems  Patient in bed, looks comfortable, no fever, no hypoglycemia.    Labs and Fingersticks  CAPILLARY BLOOD GLUCOSE      POCT Blood Glucose.: 162 mg/dL (05 Oct 2019 07:41)  POCT Blood Glucose.: 108 mg/dL (04 Oct 2019 21:45)  POCT Blood Glucose.: 89 mg/dL (04 Oct 2019 17:03)  POCT Blood Glucose.: 154 mg/dL (04 Oct 2019 12:34)      Anion Gap, Serum: 20 <H> (10-05 @ 06:42)  Anion Gap, Serum: 15 (10-04 @ 07:08)      Calcium, Total Serum: 8.1 <L> (10-05 @ 06:42)  Calcium, Total Serum: 8.6 (10-04 @ 07:08)          10-05    132<L>  |  90<L>  |  70<H>  ----------------------------<  109<H>  4.1   |  22  |  9.03<H>    Ca    8.1<L>      05 Oct 2019 06:42                          9.1    14.58 )-----------( 265      ( 05 Oct 2019 06:43 )             28.9     Medications  MEDICATIONS  (STANDING):  ALBUTerol/ipratropium for Nebulization 3 milliLiter(s) Nebulizer every 6 hours  amiodarone    Tablet 200 milliGRAM(s) Oral daily  amiodarone    Tablet   Oral   aspirin enteric coated 81 milliGRAM(s) Oral daily  atorvastatin 40 milliGRAM(s) Oral at bedtime  dextrose 5%. 1000 milliLiter(s) (50 mL/Hr) IV Continuous <Continuous>  dextrose 50% Injectable 12.5 Gram(s) IV Push once  dextrose 50% Injectable 25 Gram(s) IV Push once  dextrose 50% Injectable 25 Gram(s) IV Push once  docusate sodium 100 milliGRAM(s) Oral three times a day  famotidine    Tablet 20 milliGRAM(s) Oral at bedtime  insulin glargine Injectable (LANTUS) 14 Unit(s) SubCutaneous at bedtime  insulin lispro (HumaLOG) corrective regimen sliding scale   SubCutaneous three times a day before meals  insulin lispro (HumaLOG) corrective regimen sliding scale   SubCutaneous at bedtime  insulin lispro Injectable (HumaLOG) 5 Unit(s) SubCutaneous three times a day before meals  ketotifen 0.025% Ophthalmic Solution 1 Drop(s) Both EYES two times a day  methimazole 5 milliGRAM(s) Oral daily  metoprolol tartrate 50 milliGRAM(s) Oral every 8 hours  sevelamer carbonate 800 milliGRAM(s) Oral three times a day with meals  sodium chloride 0.9% lock flush 3 milliLiter(s) IV Push every 8 hours  tiotropium 18 MICROgram(s) Capsule 1 Capsule(s) Inhalation daily      Physical Exam  General: Patient comfortable in bed  Vital Signs Last 12 Hrs  T(F): 97 (10-05-19 @ 05:14), Max: 97 (10-05-19 @ 05:14)  HR: 60 (10-05-19 @ 09:31) (54 - 61)  BP: 117/57 (10-05-19 @ 05:14) (117/57 - 117/57)  BP(mean): --  RR: 16 (10-05-19 @ 05:14) (16 - 16)  SpO2: 96% (10-05-19 @ 09:31) (95% - 100%)  Neck: No palpable thyroid nodules.  CVS: S1S2, No murmurs  Respiratory: No wheezing, no crepitations  GI: Abdomen soft, bowel sounds positive  Musculoskeletal:  edema lower extremities.   Skin: No skin rashes, no ecchymosis    Diagnostics    Thyroid Stimulating Hormone, Serum: AM Sched. Collection: 23-Sep-2019 06:00 (09-22 @ 17:06)  Free Thyroxine, Serum: AM Sched. Collection: 23-Sep-2019 06:00 (09-22 @ 17:06)

## 2019-10-05 NOTE — PROVIDER CONTACT NOTE (OTHER) - BACKGROUND
Pt. scheduled for Cardiac surgery 9/18, s/p CATH revealed Multiple vessel disease
69 yr old male s/p C3l with PMHx: ESRD, CoRS stents, hyperthyroid, DM, CVA, MI  preop endo consult for Hyperthyroid and hyperglycemia
9/23 s/p C3L  9/26 AFIB  9/25 s/p cath> %% stenosis
ESR patient s/p Cabg x3 has increasing K+ on abg.
ESRD, DM2, HTN, HIL, C3L. lopressor 25mg TID. amiodaron load completed.
Pt is a 70 y/o M s/p C4 on 9/23. Pt with a history diabetes, A1c 7.7

## 2019-10-05 NOTE — PROGRESS NOTE ADULT - SUBJECTIVE AND OBJECTIVE BOX
pt seen and examined, no complaints, ROS - .          acetaminophen   Tablet .. 650 milliGRAM(s) Oral every 6 hours PRN  ALBUTerol/ipratropium for Nebulization 3 milliLiter(s) Nebulizer every 6 hours  amiodarone    Tablet 200 milliGRAM(s) Oral daily  amiodarone    Tablet   Oral   aspirin enteric coated 81 milliGRAM(s) Oral daily  atorvastatin 40 milliGRAM(s) Oral at bedtime  dextrose 40% Gel 15 Gram(s) Oral once PRN  dextrose 5%. 1000 milliLiter(s) IV Continuous <Continuous>  dextrose 50% Injectable 12.5 Gram(s) IV Push once  dextrose 50% Injectable 25 Gram(s) IV Push once  dextrose 50% Injectable 25 Gram(s) IV Push once  docusate sodium 100 milliGRAM(s) Oral three times a day  famotidine    Tablet 20 milliGRAM(s) Oral at bedtime  glucagon  Injectable 1 milliGRAM(s) IntraMuscular once PRN  insulin glargine Injectable (LANTUS) 14 Unit(s) SubCutaneous at bedtime  insulin lispro (HumaLOG) corrective regimen sliding scale   SubCutaneous three times a day before meals  insulin lispro (HumaLOG) corrective regimen sliding scale   SubCutaneous at bedtime  insulin lispro Injectable (HumaLOG) 5 Unit(s) SubCutaneous three times a day before meals  ketotifen 0.025% Ophthalmic Solution 1 Drop(s) Both EYES two times a day  methimazole 5 milliGRAM(s) Oral daily  metoprolol tartrate 50 milliGRAM(s) Oral every 8 hours  sevelamer carbonate 800 milliGRAM(s) Oral three times a day with meals  sodium chloride 0.9% lock flush 3 milliLiter(s) IV Push every 8 hours  tiotropium 18 MICROgram(s) Capsule 1 Capsule(s) Inhalation daily                            8.8    13.02 )-----------( 278      ( 04 Oct 2019 07:08 )             28.3       Hemoglobin: 8.8 g/dL (10-04 @ 07:08)  Hemoglobin: 9.1 g/dL (10-03 @ 04:53)  Hemoglobin: 8.8 g/dL (10-02 @ 05:00)  Hemoglobin: 9.8 g/dL (10-01 @ 04:24)      10-04    134<L>  |  91<L>  |  58<H>  ----------------------------<  82  3.7   |  28  |  7.68<H>    Ca    8.6      04 Oct 2019 07:08      Creatinine Trend: 7.68<--, 9.01<--, 7.42<--, 9.14<--, 7.54<--, 6.09<--    COAGS:           T(C): 36.7 (10-04-19 @ 20:30), Max: 37 (10-04-19 @ 05:01)  HR: 61 (10-05-19 @ 00:34) (55 - 70)  BP: 142/66 (10-04-19 @ 20:30) (117/55 - 167/72)  RR: 17 (10-04-19 @ 20:30) (17 - 18)  SpO2: 97% (10-05-19 @ 00:34) (96% - 99%)  Wt(kg): --    I&O's Summary    03 Oct 2019 07:  -  04 Oct 2019 07:00  --------------------------------------------------------  IN: 2340 mL / OUT: 5110 mL / NET: -2770 mL    04 Oct 2019 07:01  -  05 Oct 2019 04:30  --------------------------------------------------------  IN: 1420 mL / OUT: 3240 mL / NET: -1820 mL      Gen: Appears well in NAD  HEENT:  (-)icterus (-)pallor  CV: N S1 S2 1/6 DEJAH (+)2 Pulses B/l  Resp: decreased bs at bases, normal effort  GI: (+) BS Soft, NT, ND  Lymph:  (+) B/L LE Edema, (-)obvious lymphadenopathy  Skin: Warm to touch, Normal turgor  Psych: Appropriate mood and affect      TELEMETRY: SR      < from: Cardiac Cath Lab - Adult (19 @ 12:28) >  PROCEDURE:  --  Left heart catheterization.  --  Left coronary angiography.  --  Right coronary angiography.  --  Sonosite - Diagnostic.  VENTRICLES: No LV gram was performed; however, a recent echocardiogram  demonstrated normal global and regional LV function.  CORONARY VESSELS: The coronary circulation is right dominant.  LM:   --  Ostial LM: There was a discrete 90 % stenosis.  LAD:   --  Mid LAD: Angiography showed minor luminal irregularities with no  flow limiting lesions. The stent in the mid LAD is patent.  --  Distal LAD: The vessel was small sized. Angiography showed moderate  atherosclerosis.  CX:   --  Mid circumflex: There was a tubular 50 % stenosis at the site of  a prior stent.  RI:   --  Proximal ramus intermedius: There was a diffuse 50 % stenosis at  the site of a prior stent.  RCA:   --  Mid RCA: There was a tubular 50 % stenosis at the site of a  prior stent.  --  RPLS: Angiography showed minor luminal irregularities with no flow  limiting lesions.  COMPLICATIONS: There were no complications.  DIAGNOSTIC RECOMMENDATIONS: Consultation with a cardiac surgeon be obtained  for surgical opinion and coronary artery bypass grafting.  Prepared and signed by  Jessy Méndez M.D.  Signed 2019 13:42:23  HEMODYNAMIC TABLES  Pressures:  NO PHASE  Pressures:  - HR: 69  Pressures:  - Rhythm:  Pressures:  -- Aortic Pressure (S/D/M): 170/70/113  Pressures:  -- Left Ventricle (s/edp): 164/28/--  Outputs:  NO PHASE  Outputs:  -- CALCULATIONS[age]  Outputs:  -- CALCULATIONS: Body Surface Area: 1.92  Outputs:  -- CALCULATIONS: Height in cm: 168.00  Outputs:  -- CALCULATIONS: Sex: Male  Outputs:  -- CALCULATIONS: Weight in k.60  Outputs:  -- OUTPUTS: O2 consumption: 239.38  Outputs:  -- OUTPUTS: Vo2 Indexed: 125.00  < end of copied text >      ASSESSMENT/PLAN: 70 y/o male with PMHx of CAD, HTN, HLD, T2DM, and ESRD on HD presented after abnormal stress test to [facility] for elective cardiac catheterization. Patient admitted post cath after cath revealed severe ostial LM disease. Now transferred to [facility] for CABG. S/p C3L on . Patient developed new onset Afib on .  	   - EP f/u appreciated - 4-6 weeks of coumadin and Amio  - tolerating BB, hr stable   - Continue ASA, Statin  - continue with fluid removal with HD  - follow up CT surgery

## 2019-10-05 NOTE — PROGRESS NOTE ADULT - REASON FOR ADMISSION
CABG
CABG x 3 L
CABG x 3 L
Coronary Artery Disease
dyspnea,  coronary artery atherosclerosis
dyspnea,  coronary artery atherosclerosis
sp    C3l
transferred for work up
sob
Cardiac Surgery
Cardiac Surgery
sp    C3L
sp   C3L
Preop CABG
chest pain
chest pain
s/p CABG
CAD
chest pain
chest pain
chest pain for CABG
transferred for work up

## 2019-10-05 NOTE — PROVIDER CONTACT NOTE (OTHER) - ACTION/TREATMENT ORDERED:
EKG   Cardiac Enzymes sent  Nitro Gtt. started  Will continue to monitor
Will increase anti arrhythmic
Advised to continue to observe.
Per JOI Mak - please give 10 units of Lantus. Standing Humalog pre-meal ordered, corrective action scale increased. Please recheck blood sugar at 00:00 and 02:00. Will continue to monitor.
continue to monitor.

## 2019-10-05 NOTE — PROGRESS NOTE ADULT - ASSESSMENT
Assessment  DMT2: 69y Male with DM T2 with hyperglycemia, on insulin, FS in acceptable range, no new hypoglycemic episode eating partial meals, appears comfortable, planning d/c tomorrow after HD.  CAD: Postop CABG 9/23, on medications, no chest pain, stable, monitored.  HTN: Controlled, on antihypertensive medications.  Hyperthyroidism: On low dose Methimazole. New  TFTs WNL: TSH 1.48, Free T4 1.3.  ESRD: HD planned tomorrow. Monitor labs/BMP            Yg Mackey MD  Cell: [phone]  Office: [phone]

## 2019-10-05 NOTE — PROGRESS NOTE ADULT - SUBJECTIVE AND OBJECTIVE BOX
[facility] : Progress Note :: [phone], (office [phone]),   Dr Calle / Dr Esposito / Dr Macias / Dr Mendiola / Dr Narendra JULIEN / Dr Arroyo / Dr Knight / Dr Renan pickens  _____________________________________________________________________________________________  Patient is a 69y Male     lisinopril (Other)    Hospital Medications:   MEDICATIONS  (STANDING):  ALBUTerol/ipratropium for Nebulization 3 milliLiter(s) Nebulizer every 6 hours  amiodarone    Tablet 200 milliGRAM(s) Oral daily  amiodarone    Tablet   Oral   aspirin enteric coated 81 milliGRAM(s) Oral daily  atorvastatin 40 milliGRAM(s) Oral at bedtime  dextrose 5%. 1000 milliLiter(s) (50 mL/Hr) IV Continuous <Continuous>  dextrose 50% Injectable 12.5 Gram(s) IV Push once  dextrose 50% Injectable 25 Gram(s) IV Push once  dextrose 50% Injectable 25 Gram(s) IV Push once  docusate sodium 100 milliGRAM(s) Oral three times a day  famotidine    Tablet 20 milliGRAM(s) Oral at bedtime  insulin glargine Injectable (LANTUS) 14 Unit(s) SubCutaneous at bedtime  insulin lispro (HumaLOG) corrective regimen sliding scale   SubCutaneous three times a day before meals  insulin lispro (HumaLOG) corrective regimen sliding scale   SubCutaneous at bedtime  insulin lispro Injectable (HumaLOG) 5 Unit(s) SubCutaneous three times a day before meals  ketotifen 0.025% Ophthalmic Solution 1 Drop(s) Both EYES two times a day  methimazole 5 milliGRAM(s) Oral daily  metoprolol tartrate 50 milliGRAM(s) Oral every 8 hours  sevelamer carbonate 800 milliGRAM(s) Oral three times a day with meals  sodium chloride 0.9% lock flush 3 milliLiter(s) IV Push every 8 hours  tiotropium 18 MICROgram(s) Capsule 1 Capsule(s) Inhalation daily        VITALS:  T(F): 97.8 (10-05-19 @ 12:20), Max: 98.5 (10-04-19 @ 18:00)  HR: 65 (10-05-19 @ 14:40)  BP: 118/58 (10-05-19 @ 14:40)  RR: 18 (10-05-19 @ 14:40)  SpO2: 100% (10-05-19 @ 14:40)  Wt(kg): --    10-04 @ 07:01  -  10-05 @ 07:00  --------------------------------------------------------  IN: 1660 mL / OUT: 3240 mL / NET: -1580 mL    10-05 @ 07:01  -  10-05 @ 14:43  --------------------------------------------------------  IN: 150 mL / OUT: 100 mL / NET: 50 mL        PHYSICAL EXAM:  Constitutional: NAD  HEENT: anicteric sclera, oropharynx clear.  Neck: No JVD  Respiratory: CTAB, no wheezes, rales or rhonchi  Cardiovascular: S1, S2, RRR  Gastrointestinal: BS+, soft, NT/ND  Extremities:  No peripheral edema  Neurological: A/O x 3, no focal deficits  Psychiatric: Normal mood, normal affect  : No CVA tenderness. No wagner.   Skin: No rashes  Vascular Access: LUEAVF with thrill and bruit    LABS:  10-05    132<L>  |  90<L>  |  70<H>  ----------------------------<  109<H>  4.1   |  22  |  9.03<H>    Ca    8.1<L>      05 Oct 2019 06:42      Creatinine Trend: 9.03 <--, 7.68 <--, 9.01 <--, 7.42 <--, 9.14 <--, 7.54 <--, 6.09 <--                        9.1    14.58 )-----------( 265      ( 05 Oct 2019 06:43 )             28.9     Urine Studies:      RADIOLOGY & ADDITIONAL STUDIES:

## 2019-10-05 NOTE — PROGRESS NOTE ADULT - PROBLEM SELECTOR PLAN 1
Continue with ASA 81 mg PO Daily.   Increase Lopressor 50 mg PO QID.   Continue with Statin   PW --> Cut   D/C Left pigtail cath --> f/u CXR   Increase activity as tolerated.   Encourage Chest PT / Pulmonary toileting and Incentive spirometry every 1 hour x 10 while awake.   Continue with PUD and DVT prophylaxis.   Shower on POD #5.   D/C plan home PT once medically cleared   Plan of care discussed with attending

## 2019-10-05 NOTE — PROGRESS NOTE ADULT - ASSESSMENT
69 yr old male s/p C3l with PMHx: ESRD, CoRS stents, hyperthyroid, DM, CVA, MI  preop endo consult for Hyperthyroid and hyperglycemia  post op afib - amio load  9/28 Coumadin dosing ,  afib 110 bp stable amio load and Lopressor 25 q 8, confused-chair alarm  dc percocet  9/29 VSS - LLL diminished - chest pt given - spirometry encouraged oob - o2 weened off - will ck o2 sat. INR 1.53 5mg Coumadin given  d/c planning home Tues?  9/30 VSS; INR 1.93 coumadin 3 mg,  02 sats  86 percent room air  + rhonchi  on nebs, forgetful no narcotics wbc 16 afebrile; urinalysis     10/1 Chest xray  atelectasis vs pl effusion B/L,   on bipap this am  now tolerating 3 Liters NC,    OOB to chair,   HD this am,  now in NSR  10/2 SR, Large left pleural effusion to be tapped when INR <1.8--FFP if INR still elevated in am.  HD as per renal--fluid overload--renal will dialyze tomorrow and increase UF  10/3  VSS stable.  INR 2.1 --2 FFP with HD recheck INR and perform left thoracentesis if INR <2.  HD today.  Continue to hold coumadin as patient is in SR  10/4 VVS; HD today for UF as per Renal.  Maintain Left pigtail --> LWS. CXR in AM.  HD in AM and plan to D/C home with PT post HD per Dr. Vasquez.   10/5 Patient desaturated 85% on activity --> Placed on 2 Liters NC.  Left pigtail cath D/C'd. HD today --> Afib . Lopressor increased to 50 QID Will re-assess ambulation off 02 post HD.  Plan to D/C home w/ possible home 02.

## 2019-10-05 NOTE — PROVIDER CONTACT NOTE (OTHER) - ASSESSMENT
Pt. complains of chest pain 4 out of 10
118/62  pox 100
No signs or symptoms of distress present.
Pt asymptomatic pt denies chest pain, pal, /81, , pox95%.  continue to monitor pt status call bell in easy reach.
Pt states he feels fine, minimally eating, not sweating, asymptomatic.
asymptomatic, resting in the chair. bp 159/69 hr 60 SR

## 2019-10-05 NOTE — PROGRESS NOTE ADULT - PROBLEM SELECTOR PLAN 2
Continue with Amio 200 mg PO daily   Increase Lopressor 50 mg PO QID.   PW --> cut   Coumadin 3 mg PO tonight for a INR goal 1.5-2 Continue with Amio 200 mg PO daily   Increase Lopressor 50 mg PO QID.   PW --> cut   Coumadin 5 mg PO tonight for a INR goal 1.5-2

## 2019-10-05 NOTE — PROGRESS NOTE ADULT - SUBJECTIVE AND OBJECTIVE BOX
EP ATTENDING    tele: NSR, no events    no palpitations, no syncope, no angina, ROS otherwise -           acetaminophen   Tablet .. 650 milliGRAM(s) Oral every 6 hours PRN  ALBUTerol/ipratropium for Nebulization 3 milliLiter(s) Nebulizer every 6 hours  amiodarone    Tablet 200 milliGRAM(s) Oral daily  amiodarone    Tablet   Oral   aspirin enteric coated 81 milliGRAM(s) Oral daily  atorvastatin 40 milliGRAM(s) Oral at bedtime  dextrose 40% Gel 15 Gram(s) Oral once PRN  dextrose 5%. 1000 milliLiter(s) IV Continuous <Continuous>  dextrose 50% Injectable 12.5 Gram(s) IV Push once  dextrose 50% Injectable 25 Gram(s) IV Push once  dextrose 50% Injectable 25 Gram(s) IV Push once  docusate sodium 100 milliGRAM(s) Oral three times a day  famotidine    Tablet 20 milliGRAM(s) Oral at bedtime  glucagon  Injectable 1 milliGRAM(s) IntraMuscular once PRN  insulin glargine Injectable (LANTUS) 14 Unit(s) SubCutaneous at bedtime  insulin lispro (HumaLOG) corrective regimen sliding scale   SubCutaneous three times a day before meals  insulin lispro (HumaLOG) corrective regimen sliding scale   SubCutaneous at bedtime  insulin lispro Injectable (HumaLOG) 5 Unit(s) SubCutaneous three times a day before meals  ketotifen 0.025% Ophthalmic Solution 1 Drop(s) Both EYES two times a day  methimazole 5 milliGRAM(s) Oral daily  metoprolol tartrate 50 milliGRAM(s) Oral every 8 hours  sevelamer carbonate 800 milliGRAM(s) Oral three times a day with meals  sodium chloride 0.9% lock flush 3 milliLiter(s) IV Push every 8 hours  tiotropium 18 MICROgram(s) Capsule 1 Capsule(s) Inhalation daily                            9.1    14.58 )-----------( 265      ( 05 Oct 2019 06:43 )             28.9       10-05    132<L>  |  90<L>  |  70<H>  ----------------------------<  109<H>  4.1   |  22  |  9.03<H>    Ca    8.1<L>      05 Oct 2019 06:42              T(C): 36.1 (10-05-19 @ 05:14), Max: 36.9 (10-04-19 @ 18:00)  HR: 60 (10-05-19 @ 09:31) (54 - 61)  BP: 117/57 (10-05-19 @ 05:14) (117/55 - 142/66)  RR: 16 (10-05-19 @ 05:14) (16 - 18)  SpO2: 96% (10-05-19 @ 09:31) (95% - 100%)  Wt(kg): --    Appearance: Normal	  HEENT:   Normal oral mucosa, PERRL, EOMI	  Lymphatic: No lymphadenopathy , no edema  RRR, no murmurs  Respiratory: Lungs clear to auscultation, normal effort 	  Gastrointestinal:  Soft, Non-tender, + BS	  Skin: No rashes, No ecchymoses, No cyanosis, warm to touch  Musculoskeletal: Normal range of motion, normal strength  Psychiatry:  Mood & affect appropriate                A/P) He is a pleasant 70 y/o male PMH ESRD on HD and CAD s/p multiple PCIs from 1851-5849. He now is s/p a CABG, and had post-op AF. Review of all EKGs demonstrates no evidence of pre-existing AF. His LVEF remains normal. He denies palpitation, syncope, nor angina.    -agree with 4-6 weeks of coumadin/amio  -shouldn't need a/c long term as AF was only post-op  -no further inpatient EP workup needed      Alison Archuleta M.D., [facility]  Cardiac Electrophysiology  [facility]  [address] [place] [address], [address] [place] [address]  [email or web address]    office [phone]  pager [phone]

## 2019-10-06 ENCOUNTER — TRANSCRIPTION ENCOUNTER (OUTPATIENT)
Age: 69
End: 2019-10-06

## 2019-10-06 VITALS — OXYGEN SATURATION: 99 % | HEART RATE: 74 BPM

## 2019-10-06 LAB
ANION GAP SERPL CALC-SCNC: 18 MMOL/L — HIGH (ref 5–17)
BUN SERPL-MCNC: 52 MG/DL — HIGH (ref 7–23)
CALCIUM SERPL-MCNC: 8.2 MG/DL — LOW (ref 8.4–10.5)
CHLORIDE SERPL-SCNC: 90 MMOL/L — LOW (ref 96–108)
CO2 SERPL-SCNC: 25 MMOL/L — SIGNIFICANT CHANGE UP (ref 22–31)
CREAT SERPL-MCNC: 7.48 MG/DL — HIGH (ref 0.5–1.3)
GLUCOSE BLDC GLUCOMTR-MCNC: 116 MG/DL — HIGH (ref 70–99)
GLUCOSE BLDC GLUCOMTR-MCNC: 219 MG/DL — HIGH (ref 70–99)
GLUCOSE SERPL-MCNC: 217 MG/DL — HIGH (ref 70–99)
HCT VFR BLD CALC: 30.6 % — LOW (ref 39–50)
HGB BLD-MCNC: 9.5 G/DL — LOW (ref 13–17)
INR BLD: 2.27 RATIO — HIGH (ref 0.88–1.16)
MCHC RBC-ENTMCNC: 28.3 PG — SIGNIFICANT CHANGE UP (ref 27–34)
MCHC RBC-ENTMCNC: 31 GM/DL — LOW (ref 32–36)
MCV RBC AUTO: 91.1 FL — SIGNIFICANT CHANGE UP (ref 80–100)
NRBC # BLD: 0 /100 WBCS — SIGNIFICANT CHANGE UP (ref 0–0)
PLATELET # BLD AUTO: 309 K/UL — SIGNIFICANT CHANGE UP (ref 150–400)
POTASSIUM SERPL-MCNC: 3.9 MMOL/L — SIGNIFICANT CHANGE UP (ref 3.5–5.3)
POTASSIUM SERPL-SCNC: 3.9 MMOL/L — SIGNIFICANT CHANGE UP (ref 3.5–5.3)
PROTHROM AB SERPL-ACNC: 26.8 SEC — HIGH (ref 10–12.9)
RBC # BLD: 3.36 M/UL — LOW (ref 4.2–5.8)
RBC # FLD: 14.3 % — SIGNIFICANT CHANGE UP (ref 10.3–14.5)
SODIUM SERPL-SCNC: 133 MMOL/L — LOW (ref 135–145)
WBC # BLD: 15.19 K/UL — HIGH (ref 3.8–10.5)
WBC # FLD AUTO: 15.19 K/UL — HIGH (ref 3.8–10.5)

## 2019-10-06 PROCEDURE — 99024 POSTOP FOLLOW-UP VISIT: CPT

## 2019-10-06 RX ORDER — POLYETHYLENE GLYCOL 3350 17 G/17G
17 POWDER, FOR SOLUTION ORAL
Qty: 17 | Refills: 0
Start: 2019-10-06 | End: 2019-10-19

## 2019-10-06 RX ORDER — LOSARTAN POTASSIUM 100 MG/1
1 TABLET, FILM COATED ORAL
Qty: 0 | Refills: 0 | DISCHARGE

## 2019-10-06 RX ORDER — ACETAMINOPHEN 500 MG
2 TABLET ORAL
Qty: 240 | Refills: 0
Start: 2019-10-06 | End: 2019-11-04

## 2019-10-06 RX ORDER — METOPROLOL TARTRATE 50 MG
1 TABLET ORAL
Qty: 60 | Refills: 0
Start: 2019-10-06 | End: 2019-11-04

## 2019-10-06 RX ORDER — METHIMAZOLE 10 MG/1
1 TABLET ORAL
Qty: 0 | Refills: 0 | DISCHARGE

## 2019-10-06 RX ORDER — SITAGLIPTIN 50 MG/1
1 TABLET, FILM COATED ORAL
Qty: 0 | Refills: 0 | DISCHARGE

## 2019-10-06 RX ORDER — INSULIN GLARGINE 100 [IU]/ML
14 INJECTION, SOLUTION SUBCUTANEOUS
Qty: 1 | Refills: 0
Start: 2019-10-06 | End: 2019-11-04

## 2019-10-06 RX ORDER — WARFARIN SODIUM 2.5 MG/1
1 TABLET ORAL
Qty: 14 | Refills: 0
Start: 2019-10-06 | End: 2019-10-19

## 2019-10-06 RX ORDER — KETOTIFEN FUMARATE 0.34 MG/ML
1 SOLUTION OPHTHALMIC
Qty: 0 | Refills: 0 | DISCHARGE
Start: 2019-10-06

## 2019-10-06 RX ORDER — ASPIRIN/CALCIUM CARB/MAGNESIUM 324 MG
1 TABLET ORAL
Qty: 30 | Refills: 0
Start: 2019-10-06 | End: 2019-11-04

## 2019-10-06 RX ORDER — SIMVASTATIN 20 MG/1
1 TABLET, FILM COATED ORAL
Qty: 0 | Refills: 0 | DISCHARGE

## 2019-10-06 RX ORDER — OXYCODONE HYDROCHLORIDE 5 MG/1
1 TABLET ORAL
Qty: 20 | Refills: 0
Start: 2019-10-06 | End: 2019-10-10

## 2019-10-06 RX ORDER — METOPROLOL TARTRATE 50 MG
100 TABLET ORAL EVERY 12 HOURS
Refills: 0 | Status: DISCONTINUED | OUTPATIENT
Start: 2019-10-06 | End: 2019-10-06

## 2019-10-06 RX ORDER — DOCUSATE SODIUM 100 MG
1 CAPSULE ORAL
Qty: 90 | Refills: 0
Start: 2019-10-06 | End: 2019-11-04

## 2019-10-06 RX ORDER — ISOPROPYL ALCOHOL, BENZOCAINE .7; .06 ML/ML; ML/ML
1 SWAB TOPICAL
Qty: 100 | Refills: 1
Start: 2019-10-06 | End: 2019-11-24

## 2019-10-06 RX ORDER — AMLODIPINE BESYLATE 2.5 MG/1
1 TABLET ORAL
Qty: 0 | Refills: 0 | DISCHARGE

## 2019-10-06 RX ORDER — AMIODARONE HYDROCHLORIDE 400 MG/1
1 TABLET ORAL
Qty: 30 | Refills: 0
Start: 2019-10-06 | End: 2019-11-04

## 2019-10-06 RX ORDER — LABETALOL HCL 100 MG
1 TABLET ORAL
Qty: 0 | Refills: 0 | DISCHARGE

## 2019-10-06 RX ORDER — METHIMAZOLE 10 MG/1
1 TABLET ORAL
Qty: 30 | Refills: 0
Start: 2019-10-06 | End: 2019-11-04

## 2019-10-06 RX ORDER — SIMVASTATIN 20 MG/1
1 TABLET, FILM COATED ORAL
Qty: 30 | Refills: 0
Start: 2019-10-06 | End: 2019-11-04

## 2019-10-06 RX ORDER — TICAGRELOR 90 MG/1
1 TABLET ORAL
Qty: 0 | Refills: 0 | DISCHARGE

## 2019-10-06 RX ORDER — INSULIN LISPRO 100/ML
5 VIAL (ML) SUBCUTANEOUS
Qty: 1 | Refills: 0
Start: 2019-10-06 | End: 2019-11-04

## 2019-10-06 RX ADMIN — AMIODARONE HYDROCHLORIDE 200 MILLIGRAM(S): 400 TABLET ORAL at 05:10

## 2019-10-06 RX ADMIN — Medication 5 UNIT(S): at 12:18

## 2019-10-06 RX ADMIN — Medication 3 MILLILITER(S): at 11:30

## 2019-10-06 RX ADMIN — Medication 50 MILLIGRAM(S): at 05:10

## 2019-10-06 RX ADMIN — SODIUM CHLORIDE 3 MILLILITER(S): 9 INJECTION INTRAMUSCULAR; INTRAVENOUS; SUBCUTANEOUS at 06:05

## 2019-10-06 RX ADMIN — Medication 5 UNIT(S): at 08:10

## 2019-10-06 RX ADMIN — Medication 2: at 08:10

## 2019-10-06 RX ADMIN — Medication 100 MILLIGRAM(S): at 11:29

## 2019-10-06 RX ADMIN — KETOTIFEN FUMARATE 1 DROP(S): 0.34 SOLUTION OPHTHALMIC at 05:10

## 2019-10-06 RX ADMIN — Medication 100 MILLIGRAM(S): at 05:10

## 2019-10-06 RX ADMIN — SEVELAMER CARBONATE 800 MILLIGRAM(S): 2400 POWDER, FOR SUSPENSION ORAL at 12:17

## 2019-10-06 RX ADMIN — Medication 3 MILLILITER(S): at 05:10

## 2019-10-06 RX ADMIN — SEVELAMER CARBONATE 800 MILLIGRAM(S): 2400 POWDER, FOR SUSPENSION ORAL at 08:09

## 2019-10-06 RX ADMIN — Medication 81 MILLIGRAM(S): at 11:29

## 2019-10-06 NOTE — PROGRESS NOTE ADULT - PROBLEM SELECTOR PLAN 3
continue hemodialysis per renal
TFTs WNL, will continue monitoring and FU.
TFTs WNL, will continue monitoring and FU.
amio load  BB  +pw to epm
continue  Sevelamer 800mg  TID with meals  trend phos
continue hemodialysis per renal
continue hemodialysis per renal
jodie SCHULTZ  +pw    now in NSR   coumadin on hold
Continue Sevelamer TID
TFTs WNL, will continue monitoring and FU.
amio load  BB  +pw    now in NSR   no further coumadin
amio load  BB  +pw   coumadin dosing
continue  Sevelamer 800mg  TID with meals  trend phos
continue hemodialysis per renal
continue hemodialysis per renal  Renal to dialyze today at Dr. Vasquez's request, Spoke with Dr. South who will speak with covering nephrologist
jodie SCHULTZ  +pw    now in NSR   coumadin on hold
renal following  HD  Tuesday / Thursday / Saturday   UF today per Dr. Bowen
renal following  HD  Tuesday / Thursday / Saturday   UF today per Dr. Bowen
start  Sevelamer 800mg  TID with meals  trend phos
continue  Sevelamer 800mg  TID with meals  trend phos
TFTs WNL, will continue monitoring and FU.
Continue Sevelamer TID
amio load  BB  +pw to epm
continue hemodialysis per renal

## 2019-10-06 NOTE — PROGRESS NOTE ADULT - PROBLEM SELECTOR PLAN 4
Per cardiology and CT surgery
Per cardiology and CT surgery
Suggest to continue medications, monitoring, FU primary team recommendations.
continue meds with hold parameters
continue home meds with hold parameters
Suggest to continue medications, monitoring, FU primary team recommendations.
Suggest to continue medications, monitoring, FU primary team recommendations.
renal following  HD  T  Th Sat
Per cardiology and CT surgery
renal following  HD  T  Th Sat   today
Improving   off Narcotics
Improving   off Narcotics
Per cardiology and CT surgery
Suggest to continue medications, monitoring, FU primary team recommendations.
continue home meds with hold parameters
continue meds with hold parameters
renal following  HD  T  Th Sat
renal following  HD  T  Th Sat   today
renal following--increasee UF tomorrow for fluid overload  HD  T  Th Sat   today
renal following  HD  T  Th Sat

## 2019-10-06 NOTE — PROGRESS NOTE ADULT - PROBLEM SELECTOR PROBLEM 2
Coronary artery disease involving native coronary artery of native heart with unstable angina pectoris
Confusion
Coronary artery disease involving native coronary artery of native heart with unstable angina pectoris
Coronary artery disease involving native coronary artery of native heart with unstable angina pectoris
Anemia due to chronic kidney disease, on chronic dialysis
Confusion
Coronary artery disease involving native coronary artery of native heart with unstable angina pectoris
Coronary artery disease involving native coronary artery of native heart with unstable angina pectoris
Anemia due to chronic kidney disease, on chronic dialysis
Atrial fibrillation
Atrial fibrillation
Confusion
Coronary artery disease involving native coronary artery of native heart with unstable angina pectoris
Anemia due to chronic kidney disease, on chronic dialysis
Confusion
Coronary artery disease involving native coronary artery of native heart with unstable angina pectoris
Anemia due to chronic kidney disease, on chronic dialysis
Coronary artery disease involving native coronary artery of native heart with unstable angina pectoris

## 2019-10-06 NOTE — PROGRESS NOTE ADULT - PROBLEM SELECTOR PLAN 1
DC on current hospital insulin regimen and FU endocrinologist within 4 weeks.  Patient counseled for compliance with consistent low carb diet.

## 2019-10-06 NOTE — PROGRESS NOTE ADULT - PROBLEM SELECTOR PROBLEM 1
CAD (coronary artery disease)
CAD (coronary artery disease)
ESRD (end stage renal disease) on dialysis
Type 2 diabetes mellitus with stage 5 chronic kidney disease not on chronic dialysis, without long-term current use of insulin
Type 2 diabetes mellitus with stage 5 chronic kidney disease not on chronic dialysis, without long-term current use of insulin
CAD (coronary artery disease)
ESRD (end stage renal disease) on dialysis
S/P CABG x 3
S/P CABG x 3
Type 2 diabetes mellitus with stage 5 chronic kidney disease not on chronic dialysis, without long-term current use of insulin
ESRD (end stage renal disease) on dialysis
CAD (coronary artery disease)
Type 2 diabetes mellitus with stage 5 chronic kidney disease not on chronic dialysis, without long-term current use of insulin
ESRD (end stage renal disease) on dialysis
Type 2 diabetes mellitus with stage 5 chronic kidney disease not on chronic dialysis, without long-term current use of insulin

## 2019-10-06 NOTE — PROGRESS NOTE ADULT - SUBJECTIVE AND OBJECTIVE BOX
EP ATTENDING    tele: NSR, no events    no palpitations, no syncope, no angina, ROS otherwise -           acetaminophen   Tablet .. 650 milliGRAM(s) Oral every 6 hours PRN  ALBUTerol/ipratropium for Nebulization 3 milliLiter(s) Nebulizer every 6 hours  amiodarone    Tablet 200 milliGRAM(s) Oral daily  amiodarone    Tablet   Oral   aspirin enteric coated 81 milliGRAM(s) Oral daily  atorvastatin 40 milliGRAM(s) Oral at bedtime  dextrose 40% Gel 15 Gram(s) Oral once PRN  dextrose 5%. 1000 milliLiter(s) IV Continuous <Continuous>  dextrose 50% Injectable 12.5 Gram(s) IV Push once  dextrose 50% Injectable 25 Gram(s) IV Push once  dextrose 50% Injectable 25 Gram(s) IV Push once  docusate sodium 100 milliGRAM(s) Oral three times a day  famotidine    Tablet 20 milliGRAM(s) Oral at bedtime  glucagon  Injectable 1 milliGRAM(s) IntraMuscular once PRN  insulin glargine Injectable (LANTUS) 14 Unit(s) SubCutaneous at bedtime  insulin lispro (HumaLOG) corrective regimen sliding scale   SubCutaneous three times a day before meals  insulin lispro (HumaLOG) corrective regimen sliding scale   SubCutaneous at bedtime  insulin lispro Injectable (HumaLOG) 5 Unit(s) SubCutaneous three times a day before meals  ketotifen 0.025% Ophthalmic Solution 1 Drop(s) Both EYES two times a day  methimazole 5 milliGRAM(s) Oral daily  metoprolol succinate  milliGRAM(s) Oral every 12 hours  sevelamer carbonate 800 milliGRAM(s) Oral three times a day with meals  sodium chloride 0.9% lock flush 3 milliLiter(s) IV Push every 8 hours  tiotropium 18 MICROgram(s) Capsule 1 Capsule(s) Inhalation daily                            9.5    15.19 )-----------( 309      ( 06 Oct 2019 09:55 )             30.6       10-06    133<L>  |  90<L>  |  52<H>  ----------------------------<  217<H>  3.9   |  25  |  7.48<H>    Ca    8.2<L>      06 Oct 2019 09:55              T(C): 36.6 (10-06-19 @ 05:08), Max: 37 (10-05-19 @ 19:41)  HR: 74 (10-06-19 @ 10:12) (64 - 104)  BP: 154/86 (10-06-19 @ 05:08) (108/57 - 154/86)  RR: 18 (10-06-19 @ 05:08) (18 - 18)  SpO2: 99% (10-06-19 @ 10:12) (97% - 100%)  Wt(kg): --    Appearance: Normal	  HEENT:   Normal oral mucosa, PERRL, EOMI	  Lymphatic: No lymphadenopathy , no edema  RRR, no murmurs  Respiratory: Lungs clear to auscultation, normal effort 	  Gastrointestinal:  Soft, Non-tender, + BS	  Skin: No rashes, No ecchymoses, No cyanosis, warm to touch  Musculoskeletal: Normal range of motion, normal strength  Psychiatry:  Mood & affect appropriate                A/P) He is a pleasant 70 y/o male PMH ESRD on HD and CAD s/p multiple PCIs from 8767-1260. He now is s/p a CABG, and had post-op AF. Review of all EKGs demonstrates no evidence of pre-existing AF. His LVEF remains normal. He denies palpitation, syncope, nor angina.    -agree with 4-6 weeks of coumadin/amio for post-op AF  -shouldn't need a/c long term as AF was only post-op  -no further inpatient EP workup needed  -f/u with [facility] on 10/15 at 330pm      Alison Archuleta M.D., [facility]  Cardiac Electrophysiology  [facility]  [address] [place] [address], [address] [place] [address]  [email or web address]    office [phone]  pager [phone]

## 2019-10-06 NOTE — DISCHARGE NOTE NURSING/CASE MANAGEMENT/SOCIAL WORK - NSDCFUADDAPPT_GEN_ALL_CORE_FT
Follow Do's and Don'ts of cardiac surgery instruction packet for care and activity at home.   Continue to take your prescribed medications as directed.   Follow up with Dr. Vasquez at CTS office at [facility] 1 week after discharge, call [phone] on Monday 10/7 to schedule and confirm appointment.  Follow up w/ your nephrologist 1-2 weeks after discharge.   Follow up w/ your primary care doctor and your cardiologist in 2 weeks, call to schedule an appointment.   Follow up w/ Endocrinologist Dr. Mackey in 2 weeks.   Continue to take your coumadin as instructed.   Have your INR checked Tuesday 10/8/19 and have Dr. Mackey follow up w. results and have your coumadin dosing per INR.

## 2019-10-06 NOTE — DISCHARGE NOTE NURSING/CASE MANAGEMENT/SOCIAL WORK - PATIENT PORTAL LINK FT
You can access the FollowMyHealth Patient Portal offered by [facility] by registering at the following website: [email or web address]. By joining [facility]’s FollowMyHealth portal, you will also be able to view your health information using other applications (apps) compatible with our system.

## 2019-10-06 NOTE — PROGRESS NOTE ADULT - SUBJECTIVE AND OBJECTIVE BOX
Chief complaint  Patient is a 69y old  Male who presents with a chief complaint of Cardiac Surgery (05 Oct 2019 16:15)   Review of systems  Patient in bed, looks comfortable, no fever,  seen earlier today,  no hypoglycemia.    Labs and Fingersticks  CAPILLARY BLOOD GLUCOSE      POCT Blood Glucose.: 116 mg/dL (06 Oct 2019 12:04)  POCT Blood Glucose.: 219 mg/dL (06 Oct 2019 07:50)  POCT Blood Glucose.: 135 mg/dL (05 Oct 2019 21:24)      Anion Gap, Serum: 18 <H> (10-06 @ 09:55)  Anion Gap, Serum: 20 <H> (10-05 @ 06:42)      Calcium, Total Serum: 8.2 <L> (10-06 @ 09:55)  Calcium, Total Serum: 8.1 <L> (10-05 @ 06:42)          10-06    133<L>  |  90<L>  |  52<H>  ----------------------------<  217<H>  3.9   |  25  |  7.48<H>    Ca    8.2<L>      06 Oct 2019 09:55                          9.5    15.19 )-----------( 309      ( 06 Oct 2019 09:55 )             30.6     Medications  MEDICATIONS  (STANDING):  ALBUTerol/ipratropium for Nebulization 3 milliLiter(s) Nebulizer every 6 hours  amiodarone    Tablet 200 milliGRAM(s) Oral daily  amiodarone    Tablet   Oral   aspirin enteric coated 81 milliGRAM(s) Oral daily  atorvastatin 40 milliGRAM(s) Oral at bedtime  dextrose 5%. 1000 milliLiter(s) (50 mL/Hr) IV Continuous <Continuous>  dextrose 50% Injectable 12.5 Gram(s) IV Push once  dextrose 50% Injectable 25 Gram(s) IV Push once  dextrose 50% Injectable 25 Gram(s) IV Push once  docusate sodium 100 milliGRAM(s) Oral three times a day  famotidine    Tablet 20 milliGRAM(s) Oral at bedtime  insulin glargine Injectable (LANTUS) 14 Unit(s) SubCutaneous at bedtime  insulin lispro (HumaLOG) corrective regimen sliding scale   SubCutaneous three times a day before meals  insulin lispro (HumaLOG) corrective regimen sliding scale   SubCutaneous at bedtime  insulin lispro Injectable (HumaLOG) 5 Unit(s) SubCutaneous three times a day before meals  ketotifen 0.025% Ophthalmic Solution 1 Drop(s) Both EYES two times a day  methimazole 5 milliGRAM(s) Oral daily  metoprolol succinate  milliGRAM(s) Oral every 12 hours  sevelamer carbonate 800 milliGRAM(s) Oral three times a day with meals  sodium chloride 0.9% lock flush 3 milliLiter(s) IV Push every 8 hours  tiotropium 18 MICROgram(s) Capsule 1 Capsule(s) Inhalation daily      Physical Exam  General: Patient comfortable in bed  Vital Signs Last 12 Hrs  T(F): --  HR: 74 (10-06-19 @ 10:12) (74 - 82)  BP: --  BP(mean): --  RR: --  SpO2: 99% (10-06-19 @ 10:12) (99% - 99%)  Neck: No palpable thyroid nodules.  CVS: S1S2, No murmurs  Respiratory: No wheezing, no crepitations  GI: Abdomen soft, bowel sounds positive  Musculoskeletal:  edema lower extremities.   Skin: No skin rashes, no ecchymosis    Diagnostics    Thyroid Stimulating Hormone, Serum: AM Sched. Collection: 23-Sep-2019 06:00 (09-22 @ 17:06)  Free Thyroxine, Serum: AM Sched. Collection: 23-Sep-2019 06:00 (09-22 @ 17:06)

## 2019-10-06 NOTE — PROGRESS NOTE ADULT - PROBLEM SELECTOR PROBLEM 3
ESRD (end stage renal disease) on dialysis
Atrial fibrillation
Hyperthyroidism
Hyperthyroidism
Atrial fibrillation
ESRD (end stage renal disease) on dialysis
ESRD (end stage renal disease) on dialysis
Renal osteodystrophy
Atrial fibrillation
ESRD (end stage renal disease) on dialysis
Hyperthyroidism
Renal osteodystrophy
Atrial fibrillation
Renal osteodystrophy
Hyperthyroidism
Renal osteodystrophy
ESRD (end stage renal disease) on dialysis

## 2019-10-06 NOTE — PROGRESS NOTE ADULT - SUBJECTIVE AND OBJECTIVE BOX
pt seen and examined, no complaints, ROS - .        acetaminophen   Tablet .. 650 milliGRAM(s) Oral every 6 hours PRN  ALBUTerol/ipratropium for Nebulization 3 milliLiter(s) Nebulizer every 6 hours  amiodarone    Tablet 200 milliGRAM(s) Oral daily  amiodarone    Tablet   Oral   aspirin enteric coated 81 milliGRAM(s) Oral daily  atorvastatin 40 milliGRAM(s) Oral at bedtime  dextrose 40% Gel 15 Gram(s) Oral once PRN  dextrose 5%. 1000 milliLiter(s) IV Continuous <Continuous>  dextrose 50% Injectable 12.5 Gram(s) IV Push once  dextrose 50% Injectable 25 Gram(s) IV Push once  dextrose 50% Injectable 25 Gram(s) IV Push once  docusate sodium 100 milliGRAM(s) Oral three times a day  famotidine    Tablet 20 milliGRAM(s) Oral at bedtime  glucagon  Injectable 1 milliGRAM(s) IntraMuscular once PRN  insulin glargine Injectable (LANTUS) 14 Unit(s) SubCutaneous at bedtime  insulin lispro (HumaLOG) corrective regimen sliding scale   SubCutaneous three times a day before meals  insulin lispro (HumaLOG) corrective regimen sliding scale   SubCutaneous at bedtime  insulin lispro Injectable (HumaLOG) 5 Unit(s) SubCutaneous three times a day before meals  ketotifen 0.025% Ophthalmic Solution 1 Drop(s) Both EYES two times a day  methimazole 5 milliGRAM(s) Oral daily  metoprolol tartrate 50 milliGRAM(s) Oral every 6 hours  sevelamer carbonate 800 milliGRAM(s) Oral three times a day with meals  sodium chloride 0.9% lock flush 3 milliLiter(s) IV Push every 8 hours  tiotropium 18 MICROgram(s) Capsule 1 Capsule(s) Inhalation daily                            9.1    14.58 )-----------( 265      ( 05 Oct 2019 06:43 )             28.9       Hemoglobin: 9.1 g/dL (10-05 @ 06:43)  Hemoglobin: 8.8 g/dL (10-04 @ 07:08)  Hemoglobin: 9.1 g/dL (10-03 @ 04:53)  Hemoglobin: 8.8 g/dL (10-02 @ 05:00)  Hemoglobin: 9.8 g/dL (10-01 @ 04:24)      10-05    132<L>  |  90<L>  |  70<H>  ----------------------------<  109<H>  4.1   |  22  |  9.03<H>    Ca    8.1<L>      05 Oct 2019 06:42      Creatinine Trend: 9.03<--, 7.68<--, 9.01<--, 7.42<--, 9.14<--, 7.54<--    COAGS:           T(C): 37 (10-05-19 @ 19:41), Max: 37 (10-05-19 @ 19:41)  HR: 66 (10-05-19 @ 23:43) (54 - 104)  BP: 108/57 (10-05-19 @ 19:41) (101/57 - 118/58)  RR: 18 (10-05-19 @ 19:41) (16 - 20)  SpO2: 100% (10-05-19 @ 23:43) (83% - 100%)  Wt(kg): --    I&O's Summary    04 Oct 2019 07:  -  05 Oct 2019 07:00  --------------------------------------------------------  IN: 1660 mL / OUT: 3240 mL / NET: -1580 mL    05 Oct 2019 07:  -  06 Oct 2019 02:48  --------------------------------------------------------  IN: 1290 mL / OUT: 3300 mL / NET: -2010 mL       Gen: Appears well in NAD  HEENT:  (-)icterus (-)pallor  CV: N S1 S2 /6 DEJAH (+)2 Pulses B/l  Resp: decreased bs at bases, normal effort  GI: (+) BS Soft, NT, ND  Lymph:  (+) B/L LE Edema, (-)obvious lymphadenopathy  Skin: Warm to touch, Normal turgor  Psych: Appropriate mood and affect      TELEMETRY: SR      < from: Cardiac Cath Lab - Adult (19 @ 12:28) >  PROCEDURE:  --  Left heart catheterization.  --  Left coronary angiography.  --  Right coronary angiography.  --  Sonosite - Diagnostic.  VENTRICLES: No LV gram was performed; however, a recent echocardiogram  demonstrated normal global and regional LV function.  CORONARY VESSELS: The coronary circulation is right dominant.  LM:   --  Ostial LM: There was a discrete 90 % stenosis.  LAD:   --  Mid LAD: Angiography showed minor luminal irregularities with no  flow limiting lesions. The stent in the mid LAD is patent.  --  Distal LAD: The vessel was small sized. Angiography showed moderate  atherosclerosis.  CX:   --  Mid circumflex: There was a tubular 50 % stenosis at the site of  a prior stent.  RI:   --  Proximal ramus intermedius: There was a diffuse 50 % stenosis at  the site of a prior stent.  RCA:   --  Mid RCA: There was a tubular 50 % stenosis at the site of a  prior stent.  --  RPLS: Angiography showed minor luminal irregularities with no flow  limiting lesions.  COMPLICATIONS: There were no complications.  DIAGNOSTIC RECOMMENDATIONS: Consultation with a cardiac surgeon be obtained  for surgical opinion and coronary artery bypass grafting.  Prepared and signed by  Jessy Méndez M.D.  Signed 2019 13:42:23  HEMODYNAMIC TABLES  Pressures:  NO PHASE  Pressures:  - HR: 69  Pressures:  - Rhythm:  Pressures:  -- Aortic Pressure (S/D/M): 170/70/113  Pressures:  -- Left Ventricle (s/edp): 164/28/--  Outputs:  NO PHASE  Outputs:  -- CALCULATIONS[age]  Outputs:  -- CALCULATIONS: Body Surface Area: 1.92  Outputs:  -- CALCULATIONS: Height in cm: 168.00  Outputs:  -- CALCULATIONS: Sex: Male  Outputs:  -- CALCULATIONS: Weight in k.60  Outputs:  -- OUTPUTS: O2 consumption: 239.38  Outputs:  -- OUTPUTS: Vo2 Indexed: 125.00  < end of copied text >      ASSESSMENT/PLAN: 68 y/o male with PMHx of CAD, HTN, HLD, T2DM, and ESRD on HD presented after abnormal stress test to [facility] for elective cardiac catheterization. Patient admitted post cath after cath revealed severe ostial LM disease. Now transferred to [facility] for CABG. S/p C3L on . Patient developed new onset Afib on .  	   - EP f/u appreciated - 4-6 weeks of coumadin and Amio  - tolerating BB, hr stable   - Continue ASA, Statin  - continue with fluid removal with HD per renal   - follow up CT surgery

## 2019-10-06 NOTE — PROGRESS NOTE ADULT - PROBLEM SELECTOR PROBLEM 4
CAD (coronary artery disease)
ESRD (end stage renal disease) on dialysis
HTN (Hypertension)
HTN (Hypertension)
ESRD (end stage renal disease) on dialysis
HTN (Hypertension)
HTN (Hypertension)
CAD (coronary artery disease)
ESRD (end stage renal disease) on dialysis
HTN (Hypertension)
HTN (Hypertension)
CAD (coronary artery disease)
Confusion
Confusion
ESRD (end stage renal disease) on dialysis
HTN (Hypertension)
CAD (coronary artery disease)
HTN (Hypertension)

## 2019-10-06 NOTE — PROGRESS NOTE ADULT - ASSESSMENT
Assessment  DMT2: 69y Male with DM T2 with hyperglycemia, on insulin, FS in acceptable range, no new hypoglycemic episode eating partial meals, appears comfortable, DC home today.  CAD: Postop CABG 9/23, on medications, no chest pain, stable, monitored.  HTN: Controlled, on antihypertensive medications.  Hyperthyroidism: On low dose Methimazole. New  TFTs WNL: TSH 1.48, Free T4 1.3.  ESRD: HD planned tomorrow. Monitor labs/BMP            Yg Mackey MD  Cell: [phone]  Office: [phone]

## 2019-10-09 ENCOUNTER — APPOINTMENT (OUTPATIENT)
Dept: CARE COORDINATION | Facility: HOME HEALTH | Age: 69
End: 2019-10-09
Payer: MEDICARE

## 2019-10-09 PROCEDURE — 99024 POSTOP FOLLOW-UP VISIT: CPT

## 2019-10-11 VITALS — SYSTOLIC BLOOD PRESSURE: 130 MMHG | DIASTOLIC BLOOD PRESSURE: 70 MMHG

## 2019-10-11 RX ORDER — WARFARIN SODIUM 3 MG/1
3 TABLET ORAL
Refills: 0 | Status: ACTIVE | COMMUNITY

## 2019-10-11 RX ORDER — INSULIN LISPRO 100 [IU]/ML
100 INJECTION, SOLUTION INTRAVENOUS; SUBCUTANEOUS
Qty: 1 | Refills: 0 | Status: ACTIVE | COMMUNITY

## 2019-10-11 RX ORDER — INSULIN GLARGINE 100 [IU]/ML
INJECTION, SOLUTION SUBCUTANEOUS AT BEDTIME
Refills: 0 | Status: ACTIVE | COMMUNITY

## 2019-10-11 NOTE — ASSESSMENT
[FreeTextEntry1] : 69 yr old male s/p C3l with PMHx: ESRD, CoRS stents, hyperthyroid, DM, CVA, MI \par preop endo consult for Hyperthyroid and hyperglycemia \par s/p C3L on 9/23 with Dr martinez

## 2019-10-11 NOTE — REVIEW OF SYSTEMS
[As Noted in HPI] : as noted in HPI [Feeling Tired] : feeling tired [Anxiety] : anxiety [Negative] : Heme/Lymph [de-identified] : anxious about ambulating

## 2019-10-11 NOTE — HISTORY OF PRESENT ILLNESS
[FreeTextEntry1] : FOLLOW YOUR HEART HOME VISIT-"[employer]\par Home Visit made to  Mr. RENTERIA ] .  Apatient of Dr Kimberly Vasquez.  S/P C3L   on 9/23. \par \par \par Visiting patient for post discharge transitional care management and post surgical follow up. \par he is accompanied by his wife and aide.\par sitting in recliner chair, in no distress\par s/p HD yesterday, removed ~3.5\par denies dizzy spells currently, feels a bit fatigued but no more than normal after routine dialysis\par \par

## 2019-10-11 NOTE — PHYSICAL EXAM
[Sclera] : the sclera and conjunctiva were normal [Neck Appearance] : the appearance of the neck was normal [] : no respiratory distress [Heart Sounds] : normal S1 and S2 [Murmurs] : no murmurs [Auscultation Breath Sounds / Voice Sounds] : lungs were clear to auscultation bilaterally [Examination Of The Chest] : the chest was normal in appearance [Abdomen Tenderness] : non-tender [Bowel Sounds] : normal bowel sounds [Abdomen Soft] : soft [No CVA Tenderness] : no ~M costovertebral angle tenderness [Skin Color & Pigmentation] : normal skin color and pigmentation [Abnormal Walk] : normal gait [FreeTextEntry1] : MTI, Lower leg harvest site [No Focal Deficits] : no focal deficits [Oriented To Time, Place, And Person] : oriented to person, place, and time

## 2019-10-15 RX ORDER — METOPROLOL SUCCINATE 100 MG/1
100 TABLET, EXTENDED RELEASE ORAL
Qty: 60 | Refills: 0 | Status: ACTIVE | COMMUNITY
Start: 2019-10-15

## 2019-10-15 RX ORDER — GLYCOPYRROLATE AND FORMOTEROL FUMARATE 9; 4.8 UG/1; UG/1
9-4.8 AEROSOL, METERED RESPIRATORY (INHALATION) TWICE DAILY
Refills: 0 | Status: ACTIVE | COMMUNITY
Start: 2019-10-15

## 2019-10-15 RX ORDER — METOPROLOL TARTRATE 100 MG/1
100 TABLET, FILM COATED ORAL
Refills: 0 | Status: COMPLETED | COMMUNITY
End: 2019-10-15

## 2019-10-15 RX ORDER — SIMVASTATIN 40 MG/1
40 TABLET, FILM COATED ORAL
Qty: 90 | Refills: 1 | Status: ACTIVE | COMMUNITY

## 2019-10-15 RX ORDER — METHIMAZOLE 5 MG/1
5 TABLET ORAL DAILY
Refills: 0 | Status: ACTIVE | COMMUNITY

## 2019-10-15 RX ORDER — ALBUTEROL SULFATE 90 UG/1
108 (90 BASE) AEROSOL, METERED RESPIRATORY (INHALATION) EVERY 4 HOURS
Refills: 0 | Status: ACTIVE | COMMUNITY
Start: 2019-10-15

## 2019-10-15 RX ORDER — TIOTROPIUM BROMIDE INHALATION SPRAY 3.12 UG/1
2.5 SPRAY, METERED RESPIRATORY (INHALATION)
Qty: 1 | Refills: 2 | Status: ACTIVE | COMMUNITY
Start: 2019-10-15

## 2019-10-15 RX ORDER — AZELASTINE HYDROCHLORIDE 0.5 MG/ML
0.05 SOLUTION/ DROPS OPHTHALMIC TWICE DAILY
Refills: 0 | Status: ACTIVE | COMMUNITY

## 2019-10-16 ENCOUNTER — APPOINTMENT (OUTPATIENT)
Dept: CARDIOTHORACIC SURGERY | Facility: CLINIC | Age: 69
End: 2019-10-16
Payer: MEDICARE

## 2019-10-16 VITALS
BODY MASS INDEX: 28.77 KG/M2 | OXYGEN SATURATION: 90 % | TEMPERATURE: 98 F | DIASTOLIC BLOOD PRESSURE: 64 MMHG | SYSTOLIC BLOOD PRESSURE: 116 MMHG | WEIGHT: 179 LBS | HEIGHT: 66 IN | HEART RATE: 62 BPM | RESPIRATION RATE: 16 BRPM

## 2019-10-16 DIAGNOSIS — Z09 ENCOUNTER FOR FOLLOW-UP EXAMINATION AFTER COMPLETED TREATMENT FOR CONDITIONS OTHER THAN MALIGNANT NEOPLASM: ICD-10-CM

## 2019-10-16 DIAGNOSIS — E78.5 HYPERLIPIDEMIA, UNSPECIFIED: ICD-10-CM

## 2019-10-16 DIAGNOSIS — Z95.1 PRESENCE OF AORTOCORONARY BYPASS GRAFT: ICD-10-CM

## 2019-10-16 PROCEDURE — 99024 POSTOP FOLLOW-UP VISIT: CPT

## 2019-10-30 ENCOUNTER — APPOINTMENT (OUTPATIENT)
Dept: CARDIOTHORACIC SURGERY | Facility: CLINIC | Age: 69
End: 2019-10-30

## 2019-10-30 VITALS
WEIGHT: 176.37 LBS | BODY MASS INDEX: 28.34 KG/M2 | DIASTOLIC BLOOD PRESSURE: 76 MMHG | HEIGHT: 66 IN | TEMPERATURE: 97 F | SYSTOLIC BLOOD PRESSURE: 171 MMHG | RESPIRATION RATE: 12 BRPM | HEART RATE: 64 BPM | OXYGEN SATURATION: 92 %

## 2019-11-12 PROCEDURE — 84295 ASSAY OF SERUM SODIUM: CPT

## 2019-11-12 PROCEDURE — 84100 ASSAY OF PHOSPHORUS: CPT

## 2019-11-12 PROCEDURE — 82947 ASSAY GLUCOSE BLOOD QUANT: CPT

## 2019-11-12 PROCEDURE — 81001 URINALYSIS AUTO W/SCOPE: CPT

## 2019-11-12 PROCEDURE — 86923 COMPATIBILITY TEST ELECTRIC: CPT

## 2019-11-12 PROCEDURE — 80048 BASIC METABOLIC PNL TOTAL CA: CPT

## 2019-11-12 PROCEDURE — 80074 ACUTE HEPATITIS PANEL: CPT

## 2019-11-12 PROCEDURE — 83605 ASSAY OF LACTIC ACID: CPT

## 2019-11-12 PROCEDURE — 97110 THERAPEUTIC EXERCISES: CPT

## 2019-11-12 PROCEDURE — 82550 ASSAY OF CK (CPK): CPT

## 2019-11-12 PROCEDURE — 83735 ASSAY OF MAGNESIUM: CPT

## 2019-11-12 PROCEDURE — 94010 BREATHING CAPACITY TEST: CPT

## 2019-11-12 PROCEDURE — 85610 PROTHROMBIN TIME: CPT

## 2019-11-12 PROCEDURE — 94002 VENT MGMT INPAT INIT DAY: CPT

## 2019-11-12 PROCEDURE — 82553 CREATINE MB FRACTION: CPT

## 2019-11-12 PROCEDURE — 99261: CPT

## 2019-11-12 PROCEDURE — 82435 ASSAY OF BLOOD CHLORIDE: CPT

## 2019-11-12 PROCEDURE — 84132 ASSAY OF SERUM POTASSIUM: CPT

## 2019-11-12 PROCEDURE — 85014 HEMATOCRIT: CPT

## 2019-11-12 PROCEDURE — 80076 HEPATIC FUNCTION PANEL: CPT

## 2019-11-12 PROCEDURE — P9059: CPT

## 2019-11-12 PROCEDURE — 94640 AIRWAY INHALATION TREATMENT: CPT

## 2019-11-12 PROCEDURE — 97116 GAIT TRAINING THERAPY: CPT

## 2019-11-12 PROCEDURE — 82803 BLOOD GASES ANY COMBINATION: CPT

## 2019-11-12 PROCEDURE — 71045 X-RAY EXAM CHEST 1 VIEW: CPT

## 2019-11-12 PROCEDURE — 84443 ASSAY THYROID STIM HORMONE: CPT

## 2019-11-12 PROCEDURE — 80053 COMPREHEN METABOLIC PANEL: CPT

## 2019-11-12 PROCEDURE — 84134 ASSAY OF PREALBUMIN: CPT

## 2019-11-12 PROCEDURE — 84484 ASSAY OF TROPONIN QUANT: CPT

## 2019-11-12 PROCEDURE — 82962 GLUCOSE BLOOD TEST: CPT

## 2019-11-12 PROCEDURE — P9017: CPT

## 2019-11-12 PROCEDURE — C1889: CPT

## 2019-11-12 PROCEDURE — 85027 COMPLETE CBC AUTOMATED: CPT

## 2019-11-12 PROCEDURE — 86891 AUTOLOGOUS BLOOD OP SALVAGE: CPT

## 2019-11-12 PROCEDURE — 93306 TTE W/DOPPLER COMPLETE: CPT

## 2019-11-12 PROCEDURE — 87641 MR-STAPH DNA AMP PROBE: CPT

## 2019-11-12 PROCEDURE — P9047: CPT

## 2019-11-12 PROCEDURE — 82330 ASSAY OF CALCIUM: CPT

## 2019-11-12 PROCEDURE — 86850 RBC ANTIBODY SCREEN: CPT

## 2019-11-12 PROCEDURE — 93005 ELECTROCARDIOGRAM TRACING: CPT

## 2019-11-12 PROCEDURE — 85576 BLOOD PLATELET AGGREGATION: CPT

## 2019-11-12 PROCEDURE — 86901 BLOOD TYPING SEROLOGIC RH(D): CPT

## 2019-11-12 PROCEDURE — 85730 THROMBOPLASTIN TIME PARTIAL: CPT

## 2019-11-12 PROCEDURE — 85384 FIBRINOGEN ACTIVITY: CPT

## 2019-11-12 PROCEDURE — 87640 STAPH A DNA AMP PROBE: CPT

## 2019-11-12 PROCEDURE — 82565 ASSAY OF CREATININE: CPT

## 2019-11-12 PROCEDURE — C1769: CPT

## 2019-11-12 PROCEDURE — 86900 BLOOD TYPING SEROLOGIC ABO: CPT

## 2019-11-12 PROCEDURE — 94660 CPAP INITIATION&MGMT: CPT

## 2019-11-12 PROCEDURE — 83036 HEMOGLOBIN GLYCOSYLATED A1C: CPT

## 2019-11-12 PROCEDURE — 93880 EXTRACRANIAL BILAT STUDY: CPT

## 2019-11-12 PROCEDURE — 83880 ASSAY OF NATRIURETIC PEPTIDE: CPT

## 2019-11-12 PROCEDURE — 84439 ASSAY OF FREE THYROXINE: CPT

## 2019-11-12 PROCEDURE — C1751: CPT

## 2019-11-12 PROCEDURE — 97161 PT EVAL LOW COMPLEX 20 MIN: CPT

## 2019-11-12 PROCEDURE — P9016: CPT

## 2019-11-12 PROCEDURE — 36430 TRANSFUSION BLD/BLD COMPNT: CPT

## 2019-11-13 ENCOUNTER — APPOINTMENT (OUTPATIENT)
Dept: CARDIOTHORACIC SURGERY | Facility: CLINIC | Age: 69
End: 2019-11-13

## 2019-11-13 VITALS
DIASTOLIC BLOOD PRESSURE: 70 MMHG | OXYGEN SATURATION: 93 % | BODY MASS INDEX: 28.45 KG/M2 | HEIGHT: 66 IN | SYSTOLIC BLOOD PRESSURE: 175 MMHG | RESPIRATION RATE: 13 BRPM | HEART RATE: 61 BPM | TEMPERATURE: 98 F | WEIGHT: 177 LBS

## 2019-11-20 ENCOUNTER — APPOINTMENT (OUTPATIENT)
Dept: ENDOVASCULAR SURGERY | Facility: CLINIC | Age: 69
End: 2019-11-20
Payer: MEDICARE

## 2019-11-20 VITALS
OXYGEN SATURATION: 93 % | DIASTOLIC BLOOD PRESSURE: 80 MMHG | RESPIRATION RATE: 15 BRPM | WEIGHT: 179 LBS | TEMPERATURE: 97.8 F | HEIGHT: 66 IN | SYSTOLIC BLOOD PRESSURE: 190 MMHG | HEART RATE: 73 BPM | BODY MASS INDEX: 28.77 KG/M2

## 2019-11-20 PROCEDURE — 36907Z: CUSTOM | Mod: 59

## 2019-11-20 PROCEDURE — 36902Z: CUSTOM

## 2019-11-20 NOTE — PROCEDURE
[Resume diet] : resume diet [Site check for bleeding/hematoma/thrill/bruit] : Site check for bleeding/hematoma/thrill/bruit [Vital signs on admission the q 15 mins x2] : Vital signs on admission the q 15 mins x2 [FreeTextEntry1] : Left arm fistula/fistulogram /Angioplasty

## 2019-11-20 NOTE — PAST MEDICAL HISTORY
[Increasing age ( >40 years old)] : Increasing age ( >40 years old) [No therapy indicated for cases scheduled for less than one hour] : No therapy indicated for cases scheduled for less than one hour. [FreeTextEntry1] : \par \par Malignant Hyperthermis (MH) Screening Tool and Risk of Bleeding Assessement\par Mr. VIOLETTA RENTERIA  denies family history of unexpected death following Anesthesia or Exercise.\par Denies Family history of Malignant Hyperthermia, Muscle or Neuromuscular disorder and High Temperature following exercise.\par \par Mr. VIOLETTA RENTERIA denies history of Muscle Spasm, Dark or Chocolate - Colored urine and Unanticipated fever immediately following anesthesia or serious exercise. \par Mr. RENTERIA  also denies bleeding tendencies/ Risks of Bleeding\par

## 2019-11-27 ENCOUNTER — APPOINTMENT (OUTPATIENT)
Dept: CARDIOTHORACIC SURGERY | Facility: CLINIC | Age: 69
End: 2019-11-27

## 2020-01-20 ENCOUNTER — INPATIENT (INPATIENT)
Facility: HOSPITAL | Age: 70
LOS: 7 days | Discharge: HOME | DRG: 291 | End: 2020-01-28
Attending: HOSPITALIST | Admitting: HOSPITALIST
Payer: MEDICARE

## 2020-01-20 VITALS
HEART RATE: 88 BPM | SYSTOLIC BLOOD PRESSURE: 148 MMHG | RESPIRATION RATE: 20 BRPM | TEMPERATURE: 98 F | OXYGEN SATURATION: 95 % | DIASTOLIC BLOOD PRESSURE: 82 MMHG | WEIGHT: 179.9 LBS | HEIGHT: 66 IN

## 2020-01-20 DIAGNOSIS — Z98.49 CATARACT EXTRACTION STATUS, UNSPECIFIED EYE: Chronic | ICD-10-CM

## 2020-01-20 DIAGNOSIS — J90 PLEURAL EFFUSION, NOT ELSEWHERE CLASSIFIED: ICD-10-CM

## 2020-01-20 DIAGNOSIS — Z99.2 DEPENDENCE ON RENAL DIALYSIS: Chronic | ICD-10-CM

## 2020-01-20 LAB
ALBUMIN SERPL ELPH-MCNC: 4.1 G/DL — SIGNIFICANT CHANGE UP (ref 3.3–5)
ALP SERPL-CCNC: 58 U/L — SIGNIFICANT CHANGE UP (ref 40–120)
ALT FLD-CCNC: 19 U/L — SIGNIFICANT CHANGE UP (ref 10–45)
ANION GAP SERPL CALC-SCNC: 19 MMOL/L — HIGH (ref 5–17)
AST SERPL-CCNC: 12 U/L — SIGNIFICANT CHANGE UP (ref 10–40)
BASOPHILS # BLD AUTO: 0.04 K/UL — SIGNIFICANT CHANGE UP (ref 0–0.2)
BASOPHILS NFR BLD AUTO: 0.4 % — SIGNIFICANT CHANGE UP (ref 0–2)
BILIRUB SERPL-MCNC: 0.5 MG/DL — SIGNIFICANT CHANGE UP (ref 0.2–1.2)
BUN SERPL-MCNC: 63 MG/DL — HIGH (ref 7–23)
CALCIUM SERPL-MCNC: 9.5 MG/DL — SIGNIFICANT CHANGE UP (ref 8.4–10.5)
CHLORIDE SERPL-SCNC: 91 MMOL/L — LOW (ref 96–108)
CO2 SERPL-SCNC: 24 MMOL/L — SIGNIFICANT CHANGE UP (ref 22–31)
CREAT SERPL-MCNC: 8.36 MG/DL — HIGH (ref 0.5–1.3)
EOSINOPHIL # BLD AUTO: 0.12 K/UL — SIGNIFICANT CHANGE UP (ref 0–0.5)
EOSINOPHIL NFR BLD AUTO: 1.3 % — SIGNIFICANT CHANGE UP (ref 0–6)
GLUCOSE BLDC GLUCOMTR-MCNC: 135 MG/DL — HIGH (ref 70–99)
GLUCOSE SERPL-MCNC: 229 MG/DL — HIGH (ref 70–99)
HCT VFR BLD CALC: 49.4 % — SIGNIFICANT CHANGE UP (ref 39–50)
HGB BLD-MCNC: 14.3 G/DL — SIGNIFICANT CHANGE UP (ref 13–17)
IMM GRANULOCYTES NFR BLD AUTO: 0.8 % — SIGNIFICANT CHANGE UP (ref 0–1.5)
LYMPHOCYTES # BLD AUTO: 1.09 K/UL — SIGNIFICANT CHANGE UP (ref 1–3.3)
LYMPHOCYTES # BLD AUTO: 11.8 % — LOW (ref 13–44)
MCHC RBC-ENTMCNC: 26.8 PG — LOW (ref 27–34)
MCHC RBC-ENTMCNC: 28.9 GM/DL — LOW (ref 32–36)
MCV RBC AUTO: 92.7 FL — SIGNIFICANT CHANGE UP (ref 80–100)
MONOCYTES # BLD AUTO: 0.86 K/UL — SIGNIFICANT CHANGE UP (ref 0–0.9)
MONOCYTES NFR BLD AUTO: 9.3 % — SIGNIFICANT CHANGE UP (ref 2–14)
NEUTROPHILS # BLD AUTO: 7.07 K/UL — SIGNIFICANT CHANGE UP (ref 1.8–7.4)
NEUTROPHILS NFR BLD AUTO: 76.4 % — SIGNIFICANT CHANGE UP (ref 43–77)
NRBC # BLD: 0 /100 WBCS — SIGNIFICANT CHANGE UP (ref 0–0)
NT-PROBNP SERPL-SCNC: HIGH PG/ML (ref 0–300)
PLATELET # BLD AUTO: 153 K/UL — SIGNIFICANT CHANGE UP (ref 150–400)
POTASSIUM SERPL-MCNC: 4.6 MMOL/L — SIGNIFICANT CHANGE UP (ref 3.5–5.3)
POTASSIUM SERPL-SCNC: 4.6 MMOL/L — SIGNIFICANT CHANGE UP (ref 3.5–5.3)
PROT SERPL-MCNC: 7.5 G/DL — SIGNIFICANT CHANGE UP (ref 6–8.3)
RBC # BLD: 5.33 M/UL — SIGNIFICANT CHANGE UP (ref 4.2–5.8)
RBC # FLD: 16.2 % — HIGH (ref 10.3–14.5)
SODIUM SERPL-SCNC: 134 MMOL/L — LOW (ref 135–145)
TROPONIN T, HIGH SENSITIVITY RESULT: 112 NG/L — HIGH (ref 0–51)
TROPONIN T, HIGH SENSITIVITY RESULT: 132 NG/L — HIGH (ref 0–51)
WBC # BLD: 9.25 K/UL — SIGNIFICANT CHANGE UP (ref 3.8–10.5)
WBC # FLD AUTO: 9.25 K/UL — SIGNIFICANT CHANGE UP (ref 3.8–10.5)

## 2020-01-20 PROCEDURE — 99223 1ST HOSP IP/OBS HIGH 75: CPT | Mod: GC

## 2020-01-20 PROCEDURE — 71046 X-RAY EXAM CHEST 2 VIEWS: CPT | Mod: 26

## 2020-01-20 PROCEDURE — 93010 ELECTROCARDIOGRAM REPORT: CPT

## 2020-01-20 PROCEDURE — 99285 EMERGENCY DEPT VISIT HI MDM: CPT | Mod: GC

## 2020-01-20 PROCEDURE — 71275 CT ANGIOGRAPHY CHEST: CPT | Mod: 26

## 2020-01-20 RX ORDER — INSULIN LISPRO 100/ML
VIAL (ML) SUBCUTANEOUS
Refills: 0 | Status: DISCONTINUED | OUTPATIENT
Start: 2020-01-20 | End: 2020-01-28

## 2020-01-20 RX ORDER — GLUCAGON INJECTION, SOLUTION 0.5 MG/.1ML
1 INJECTION, SOLUTION SUBCUTANEOUS ONCE
Refills: 0 | Status: DISCONTINUED | OUTPATIENT
Start: 2020-01-20 | End: 2020-01-28

## 2020-01-20 RX ORDER — HEPARIN SODIUM 5000 [USP'U]/ML
5000 INJECTION INTRAVENOUS; SUBCUTANEOUS EVERY 12 HOURS
Refills: 0 | Status: DISCONTINUED | OUTPATIENT
Start: 2020-01-20 | End: 2020-01-21

## 2020-01-20 RX ORDER — SIMVASTATIN 20 MG/1
40 TABLET, FILM COATED ORAL AT BEDTIME
Refills: 0 | Status: DISCONTINUED | OUTPATIENT
Start: 2020-01-20 | End: 2020-01-28

## 2020-01-20 RX ORDER — TIOTROPIUM BROMIDE 18 UG/1
1 CAPSULE ORAL; RESPIRATORY (INHALATION) DAILY
Refills: 0 | Status: DISCONTINUED | OUTPATIENT
Start: 2020-01-20 | End: 2020-01-22

## 2020-01-20 RX ORDER — IPRATROPIUM/ALBUTEROL SULFATE 18-103MCG
3 AEROSOL WITH ADAPTER (GRAM) INHALATION
Refills: 0 | Status: COMPLETED | OUTPATIENT
Start: 2020-01-20 | End: 2020-01-20

## 2020-01-20 RX ORDER — ASPIRIN/CALCIUM CARB/MAGNESIUM 324 MG
81 TABLET ORAL DAILY
Refills: 0 | Status: DISCONTINUED | OUTPATIENT
Start: 2020-01-20 | End: 2020-01-28

## 2020-01-20 RX ORDER — DEXTROSE 50 % IN WATER 50 %
25 SYRINGE (ML) INTRAVENOUS ONCE
Refills: 0 | Status: DISCONTINUED | OUTPATIENT
Start: 2020-01-20 | End: 2020-01-28

## 2020-01-20 RX ORDER — AMIODARONE HYDROCHLORIDE 400 MG/1
200 TABLET ORAL DAILY
Refills: 0 | Status: DISCONTINUED | OUTPATIENT
Start: 2020-01-20 | End: 2020-01-28

## 2020-01-20 RX ORDER — FLUTICASONE PROPIONATE 50 MCG
1 SPRAY, SUSPENSION NASAL
Qty: 0 | Refills: 0 | DISCHARGE

## 2020-01-20 RX ORDER — INSULIN GLARGINE 100 [IU]/ML
14 INJECTION, SOLUTION SUBCUTANEOUS AT BEDTIME
Refills: 0 | Status: DISCONTINUED | OUTPATIENT
Start: 2020-01-20 | End: 2020-01-23

## 2020-01-20 RX ORDER — KETOTIFEN FUMARATE 0.34 MG/ML
1 SOLUTION OPHTHALMIC DAILY
Refills: 0 | Status: DISCONTINUED | OUTPATIENT
Start: 2020-01-20 | End: 2020-01-28

## 2020-01-20 RX ORDER — PANTOPRAZOLE SODIUM 20 MG/1
40 TABLET, DELAYED RELEASE ORAL
Refills: 0 | Status: DISCONTINUED | OUTPATIENT
Start: 2020-01-20 | End: 2020-01-28

## 2020-01-20 RX ORDER — INSULIN LISPRO 100/ML
VIAL (ML) SUBCUTANEOUS AT BEDTIME
Refills: 0 | Status: DISCONTINUED | OUTPATIENT
Start: 2020-01-20 | End: 2020-01-28

## 2020-01-20 RX ORDER — GLYCOPYRROLATE AND FORMOTEROL FUMARATE 9; 4.8 UG/1; UG/1
2 AEROSOL, METERED RESPIRATORY (INHALATION)
Refills: 0 | Status: DISCONTINUED | OUTPATIENT
Start: 2020-01-20 | End: 2020-01-22

## 2020-01-20 RX ORDER — INSULIN LISPRO 100/ML
5 VIAL (ML) SUBCUTANEOUS
Refills: 0 | Status: DISCONTINUED | OUTPATIENT
Start: 2020-01-20 | End: 2020-01-28

## 2020-01-20 RX ORDER — CALCIUM ACETATE 667 MG
667 TABLET ORAL
Refills: 0 | Status: DISCONTINUED | OUTPATIENT
Start: 2020-01-20 | End: 2020-01-28

## 2020-01-20 RX ORDER — DEXTROSE 50 % IN WATER 50 %
15 SYRINGE (ML) INTRAVENOUS ONCE
Refills: 0 | Status: DISCONTINUED | OUTPATIENT
Start: 2020-01-20 | End: 2020-01-28

## 2020-01-20 RX ORDER — DEXTROSE 50 % IN WATER 50 %
12.5 SYRINGE (ML) INTRAVENOUS ONCE
Refills: 0 | Status: DISCONTINUED | OUTPATIENT
Start: 2020-01-20 | End: 2020-01-28

## 2020-01-20 RX ORDER — SODIUM CHLORIDE 9 MG/ML
1000 INJECTION, SOLUTION INTRAVENOUS
Refills: 0 | Status: DISCONTINUED | OUTPATIENT
Start: 2020-01-20 | End: 2020-01-28

## 2020-01-20 RX ORDER — METOPROLOL TARTRATE 50 MG
100 TABLET ORAL EVERY 12 HOURS
Refills: 0 | Status: DISCONTINUED | OUTPATIENT
Start: 2020-01-20 | End: 2020-01-21

## 2020-01-20 RX ADMIN — Medication 3 MILLILITER(S): at 19:34

## 2020-01-20 NOTE — H&P ADULT - NSICDXPASTMEDICALHX_GEN_ALL_CORE_FT
PAST MEDICAL HISTORY:  CAD (Coronary Artery Disease)     Coronary Stent [id number] 10 stents,  to Ramus 1/2015, cath 01/2016 ( see results in HPI)    DM (Diabetes Mellitus) x 4 yrs without N/N/R    ESRD (end stage renal disease) on dialysis T-Th-Sat    H/O: CVA after cardiac stent placed 12/15/09 -no residual    Heart Attack 2/1/07 with subsequent stent    HTN (Hypertension)     Hypercholesterolemia     Hyperthyroidism     Stented coronary artery total 15 stents from [id number]

## 2020-01-20 NOTE — H&P ADULT - NSHPPHYSICALEXAM_GEN_ALL_CORE
Vital Signs Last 24 Hrs  T(C): 36.9 (20 Jan 2020 22:06), Max: 36.9 (20 Jan 2020 17:59)  T(F): 98.4 (20 Jan 2020 22:06), Max: 98.5 (20 Jan 2020 17:59)  HR: 110 (20 Jan 2020 22:06) (85 - 110)  BP: 135/83 (20 Jan 2020 22:06) (135/83 - 148/82)  BP(mean): --  RR: 19 (20 Jan 2020 22:07) (18 - 20)  SpO2: 95% (20 Jan 2020 22:07) (89% - 95%) Vital Signs Last 24 Hrs  T(C): 36.9 (20 Jan 2020 22:06), Max: 36.9 (20 Jan 2020 17:59)  T(F): 98.4 (20 Jan 2020 22:06), Max: 98.5 (20 Jan 2020 17:59)  HR: 110 (20 Jan 2020 22:06) (85 - 110)  BP: 135/83 (20 Jan 2020 22:06) (135/83 - 148/82)  BP(mean): --  RR: 19 (20 Jan 2020 22:07) (18 - 20)  SpO2: 95% (20 Jan 2020 22:07) (89% - 95%)    PHYSICAL EXAM:  GENERAL: NAD, well-developed, pleasant male  HEAD:  Atraumatic, Normocephalic  EYES: EOMI, conjunctiva and sclera clear  NECK: Supple, No JVD  CHEST/LUNG: Diminished breath sounds in L base, No wheeze, ronchi or rales  HEART: Irregularly irregular rhythm; No murmurs, rubs, or gallops  ABDOMEN: Soft, Nontender, Nondistended; Bowel sounds present  EXTREMITIES:  2+ Peripheral Pulses, No clubbing, cyanosis, or edema  PSYCH: AAOx3  NEUROLOGY: non-focal  SKIN: No rashes or lesions Vital Signs Last 24 Hrs  T(C): 36.9 (20 Jan 2020 22:06), Max: 36.9 (20 Jan 2020 17:59)  T(F): 98.4 (20 Jan 2020 22:06), Max: 98.5 (20 Jan 2020 17:59)  HR: 110 (20 Jan 2020 22:06) (85 - 110)  BP: 135/83 (20 Jan 2020 22:06) (135/83 - 148/82)  BP(mean): --  RR: 19 (20 Jan 2020 22:07) (18 - 20)  SpO2: 95% (20 Jan 2020 22:07) (89% - 95%)    PHYSICAL EXAM:  GENERAL: NAD, well-developed, pleasant male  HEAD:  Atraumatic, Normocephalic  EYES: EOMI, conjunctiva and sclera clear  NECK: Supple, No JVD  CHEST/LUNG: Diminished breath sounds in L base, No wheeze, ronchi or rales  HEART: Irregularly irregular rhythm; No murmurs, rubs, or gallops  ABDOMEN: Soft, Nontender, Nondistended; Bowel sounds present  EXTREMITIES:  2+ Peripheral Pulses, 1+ b/l LE edema; RLE with healing graft scar   PSYCH: normal affect, calm   NEUROLOGY: moving all extremities spontaneously, AAOx3  SKIN: RLE with healing graft scar; LUE AVF with thrill

## 2020-01-20 NOTE — H&P ADULT - NSHPREVIEWOFSYSTEMS_GEN_ALL_CORE
CONSTITUTIONAL: + weakness, no fevers, + chills  EYES/ENT: No visual changes;  No vertigo or throat pain   NECK: No pain or stiffness  RESPIRATORY: + cough, no wheezing, hemoptysis; + shortness of breath  CARDIOVASCULAR: No chest pain or palpitations  GASTROINTESTINAL: No abdominal or epigastric pain. No nausea, vomiting, or hematemesis; No diarrhea or constipation. No melena or hematochezia.  GENITOURINARY: No dysuria, frequency or hematuria  NEUROLOGICAL: No numbness or weakness  SKIN: No itching, rashes CONSTITUTIONAL: + weakness, no fevers, no chills  EYES/ENT: No visual changes;  No vertigo or throat pain, +rhinorrhea   NECK: No pain or stiffness  RESPIRATORY: + cough, no wheezing, + shortness of breath  CARDIOVASCULAR: No chest pain or palpitations, +chronic LE edema   GASTROINTESTINAL: no nausea, vomiting, no abdominal pain, no BRBPR  GENITOURINARY: +anuria  NEUROLOGICAL: no numbness, no headaches, +syncope   MUSCULOSKELETAL: no back pain, no focal weakness   SKIN: No rashes, or lesions   PSYCH: no anxiety, depression

## 2020-01-20 NOTE — H&P ADULT - PROBLEM SELECTOR PLAN 9
DVT ppx: coumadin  carb-consistent renal diet check A1c   Patient on lantus 14 and humalog 5 tid - will continue while admitted  low insulin sliding scale   monitor FS ac and hs

## 2020-01-20 NOTE — H&P ADULT - PROBLEM SELECTOR PLAN 10
1.  Name of PCP: Dr Jessy Méndez   2.  PCP Contacted on Admission: [ ] Y    [ ] N    3.  PCP contacted at Discharge: [ ] Y    [ ] N    [ ] N/A  4.  Post-Discharge Appointment Date and Location:  5.  Summary of Handoff given to PCP:

## 2020-01-20 NOTE — H&P ADULT - PROBLEM SELECTOR PLAN 5
Continue ASA and statin. Denies CP. Noted HS-trop elevation to 100, but stable x2. Likely in setting of renal disease. CKMB was wnl.  - Low c/f ACS. Continue ASA and statin. Denies CP. Noted HS-trop elevation to 132, but now downtrending. Likely in setting of renal disease. CKMB was wnl.  - Low c/f ACS. Continue coumadin and amiodarone. pt with cough and worsening SOB, +sick contacts, found with paraflu+  O2 as needed   tylenol prn   pt states he feels better with nebulizers, c/w q6h standing   Guaifenesin prn cough

## 2020-01-20 NOTE — ED PROVIDER NOTE - PMH
CAD (Coronary Artery Disease)    Coronary Stent  [id number] 10 stents,  to Ramus 1/2015, cath 01/2016 ( see results in HPI)  DM (Diabetes Mellitus)  x 4 yrs without N/N/R  ESRD (end stage renal disease) on dialysis  T-Th-Sat  H/O: CVA  after cardiac stent placed 12/15/09 -no residual  Heart Attack  2/1/07 with subsequent stent  HTN (Hypertension)    Hypercholesterolemia    Hyperthyroidism    Stented coronary artery  total 15 stents from [id number]

## 2020-01-20 NOTE — ED PROVIDER NOTE - ATTENDING CONTRIBUTION TO CARE
attending Ariel: 69yM h/o CABG last September and ESRD on dialysis (due for dialysis tomorrow) p/w cough and generalized weakness x 1 week. Cough worse at night. Some associated chest pain. No edema in lower extremities. Decreased breath sounds L base. Will obtain labs, cxr, reassess

## 2020-01-20 NOTE — H&P ADULT - PROBLEM SELECTOR PLAN 6
Continue coumadin and amiodarone. Continue BB. hypoxia and volume overload, possbly 2/2 ESRD   no electrlyte abnormalities, does not require urgent HD overnight   Plan for HD tomorrow. Renal consulted overnight.

## 2020-01-20 NOTE — ED ADULT NURSE NOTE - PMH
CAD (Coronary Artery Disease)    Coronary Stent  [id number] 10 stents,  to Ramus 1/2015, cath 01/2016 ( see results in HPI)  DM (Diabetes Mellitus)  x 4 yrs without N/N/R  ESRD (end stage renal disease) on dialysis  T-Th-Sat  H/O: CVA  after cardiac stent placed 12/15/09 -no residual  Heart Attack  2/1/07 with subsequent stent  HTN (Hypertension)    Hypercholesterolemia    Hyperthyroidism    Stented coronary artery  total 15 stents from [id number]

## 2020-01-20 NOTE — H&P ADULT - PROBLEM SELECTOR PLAN 2
Patient with leukocytosis and tachycardia. Reports URI symptoms for 1 month. Will f/u RVP and blood cultures.  - Monitor off Abx for now - Plan to repeat TTE. TTE in 2019 showed normal LV function, mild diastolic dysfunction.  - Continue home BB. pt noted with moderate L pleural effusion, which is likely contributing to pts symptoms   on review of previous records, pt had post-surgical L pleural effusion requiring chest tube   pt with plan for HD with fluid removal tomorrow; c/t monitor and assess if repeat chest tube needed - will consult thoracic surgery pt noted with moderate L pleural effusion, which is likely contributing to pts symptoms   on review of previous records, pt had post-surgical L pleural effusion requiring chest tube   pt with plan for HD with fluid removal tomorrow; c/t monitor and assess if repeat chest tube needed - consult thoracic surgery in AM

## 2020-01-20 NOTE — ED PROVIDER NOTE - FAMILY HISTORY
Family history of coronary artery disease     Sibling  Still living? Unknown  Family history of diabetes mellitus, Age at diagnosis: Age Unknown

## 2020-01-20 NOTE — H&P ADULT - HISTORY OF PRESENT ILLNESS
69 M with CAD s/p CABG in 9/2019, HTN, HLD, DM2, and ESRD on HD (Tu, Thurs, Sat), presenting from home with increased lethargy and cough for 1 month. Patient's wife is at bedside and reports patient has insomnia and rhinorrhea for 1 month. Last week patient also had pre-syncopal episode. Saw PMD recently regarding symptoms, who recommended coming to ED. Patient is not on O2 at home. Does not make urine. Last HD was on Sat; 3L were removed. Patient denies increased LE swelling, no CP or palpitations. No fevers at home, no diarrhea or abdominal pain.    In ED, patient was tachycardic to  and hypoxic to 88% on RA, now satting well on 3L NC. Small to moderate L pleural effusion noted on CT chest. 69 M with CAD s/p CABG in 9/2019, HTN, HLD, DM2, Afib on coumadin and ESRD on HD (Tu, Thurs, Sat), presenting from home with increased lethargy and cough for 1 month. Patient's wife is at bedside and reports patient has insomnia and rhinorrhea for 1 month. Last week patient also had pre-syncopal episode. Saw PMD recently regarding symptoms, who recommended coming to ED. Patient is not on O2 at home. Does not make urine. Last HD was on Sat; 3L were removed. Patient denies increased LE swelling, no CP or palpitations. No fevers at home, no diarrhea or abdominal pain.    In ED, patient was tachycardic to  and hypoxic to 88% on RA, now satting well on 3L NC. Small to moderate L pleural effusion noted on CT chest. 69M with CAD s/p multiple stents, recently s/p CABG (9/2019 course c/b Afib and LT pleural effusion), HTN, HLD, DM2, Afib on coumadin and ESRD on HD (Tu, Thurs, Sat) presenting from home with increased lethargy and cough. Pt states he has been having SOB and LE edema since his surgery in September and has not returned to his baseline. Started having a cough about 3-4 weeks ago that has been persistent, non productive. Cough and SOB have gotten worse in the past 7-10 days; cough is so bad it is keeping patient awake at night, cannot walk further than 10-15 feet, which is even worse than his baseline post-surgery. LE edema has been at baseline, but pt endorses worsening orthopnea (required 2 pillows pre-surgery, 3 post-surgery, no requiring 4 pillows or sits up in bed to sleep). P also with worsening weakness and fatigue, and last week patient also had a syncopal episode, where pt was walking and lost consciousness, no head strike as pt was caught before going to the floor. Pt denies any prodromal dizziness, CP, palpitations, nausea/vomiting, diaphoresis. Pt denies recent fevers or chills, +rhinorrhea, no CP, no palpitation, no n/v/abdominal pain, no diarrhea, does not make urine. +sick contacts (niece and her whole family have been sick with URI symptoms in past 2 weeks). Saw PMD recently regarding symptoms, who recommended coming to ED. Patient is not on O2 at home. Last HD was on Sat; 3L were removed.   In ED, patient was tachycardic to  and hypoxic to 88% on RA, now satting well on 3L NC. Small to moderate L pleural effusion noted on CT chest.

## 2020-01-20 NOTE — ED ADULT NURSE NOTE - OBJECTIVE STATEMENT
68 y/o male pmh MI, CAD s/p stent x 10, DM, valve replacement, 68 y/o male pmh MI, CAD s/p stent x 10, DM, valve replacement, ESRD (left AV fistula dialysis T/TR/Sat), HTN, HCL, arrives to ED c/o cough x 1 week and shortness of breath with exertion. Patient reports since open heart surgery he has felt symptoms (october) but has worsened this past week. Patient has course breath sounds throughout lung fields, diminished in right LL. Patient is a/ox4, VSS, afebrile. No sick contacts/recent travel. Denies chest pain, palpitations, abdomen soft NT/ND. No n/v/d. No urinary symptoms. Dry unproductive cough noted. IV placed, labs drawn and sent. CM in place NSR. Wife at bedside.

## 2020-01-20 NOTE — ED PROVIDER NOTE - PHYSICAL EXAMINATION
John SAINZ MD PGY2:   PHYSICAL EXAM:    GENERAL: NAD, well-developed. Coughing.   HEENT:  Atraumatic, Normocephalic  CHEST/LUNG: Breath sounds diminished on L side.   HEART: Regular rate and rhythm  ABDOMEN: Soft, Nontender, Nondistended.   EXTREMITIES:  2+ Peripheral Pulses.  PSYCH: A&Ox3  SKIN: No obvious rashes or lesions

## 2020-01-20 NOTE — H&P ADULT - NSHPSOCIALHISTORY_GEN_ALL_CORE
Lives at home with wife. Eddie smoking, etoh or recreational drug use. Lives at home with wife. Denies smoking, etoh or recreational drug use.

## 2020-01-20 NOTE — H&P ADULT - PROBLEM SELECTOR PLAN 3
- Plan to repeat TTE. TTE in 2019 showed normal LV function, mild diastolic dysfunction.  - Continue home BB. - Plan for HD tomorrow. Renal consulted overnight. TTE in 2019 showed normal LV function, mild diastolic dysfunction; pt with worsening SOB and pedal edema and orthopnea   will recheck TTE  no role for diuresis given anuria   fluid removal per HD  monitor daily weights   - Continue home BB.

## 2020-01-20 NOTE — H&P ADULT - PROBLEM SELECTOR PLAN 4
- Plan for HD tomorrow. Renal consulted overnight. Continue ASA and statin. Denies CP. Noted HS-trop elevation to 132, but now downtrending. Likely in setting of renal disease. CKMB was wnl. No ischemic changes on EKG. Old RBBB present.  - Low c/f ACS. pt with syncopal episode one week prior to admission  wife who witnessed fall states no head strike; however, given pt on coumadin, will check CT head   monitor on tele   trop downtrending, no acute ischemic changes, RBBB on EKG noted on prior EKGs   check TTE

## 2020-01-20 NOTE — H&P ADULT - PROBLEM SELECTOR PLAN 1
Likely from volume overload (noted L pleural effusion) from new decompensated CHF (BNP 26,000 and CAD) or ESRD.  - Does not appear overloaded on exam. Does not make urine  - Renal was consulted in ED. HD orders placed for tomorrow.  - Continue 3 L NC Likely from volume overload (noted L pleural effusion) from new decompensated CHF (BNP 36,000 and CAD) or ESRD.  - Does not appear overloaded on exam. Does not make urine  - Renal was consulted in ED. HD orders placed for tomorrow.  - Continue 3 L NC Likely from volume overload (noted L pleural effusion) from new decompensated CHF (BNP 36,000 and CAD) or ESRD.  - Does not appear overloaded on exam. Does not make urine  - Renal was consulted in ED. HD orders placed for tomorrow. No urgent need for HD.  - Continue 3 L NC Likely multifactorial from parainfluenza, pleural effusion and from volume overload  from new decompensated CHF (BNP 36,000 and CAD) or ESRD  hypoxic to 88% RA, not on home O2; now on 3LNC, saturating well, no respiratory distress   L pleural effusion on CXR, b/l LE edema; however, does not make urine so no role for diuresis   Renal was consulted in ED. HD orders placed for tomorrow. No urgent need for HD at this time   titrate O2 for sat >92%

## 2020-01-20 NOTE — H&P ADULT - NSHPLABSRESULTS_GEN_ALL_CORE
CBC Full  -  ( 20 Jan 2020 16:17 )  WBC Count : 9.25 K/uL  RBC Count : 5.33 M/uL  Hemoglobin : 14.3 g/dL  Hematocrit : 49.4 %  Platelet Count - Automated : 153 K/uL  Mean Cell Volume : 92.7 fl  Mean Cell Hemoglobin : 26.8 pg  Mean Cell Hemoglobin Concentration : 28.9 gm/dL  Auto Neutrophil # : 7.07 K/uL  Auto Lymphocyte # : 1.09 K/uL  Auto Monocyte # : 0.86 K/uL  Auto Eosinophil # : 0.12 K/uL  Auto Basophil # : 0.04 K/uL  Auto Neutrophil % : 76.4 %  Auto Lymphocyte % : 11.8 %  Auto Monocyte % : 9.3 %  Auto Eosinophil % : 1.3 %  Auto Basophil % : 0.4 %    01-20    134<L>  |  91<L>  |  63<H>  ----------------------------<  229<H>  4.6   |  24  |  8.36<H>    Ca    9.5      20 Jan 2020 16:17    TPro  7.5  /  Alb  4.1  /  TBili  0.5  /  DBili  x   /  AST  12  /  ALT  19  /  AlkPhos  58  01-20    CTA chest:  1. Evaluation of the subsegmental pulmonary arteries in the lung bases are limited secondary to motion artifact. No pulmonary embolism in the remaining visualized pulmonary arteries.   2. Partially loculated small to moderate left pleural effusion with compressive atelectasis of part of the left lower lobe.  3. Diastasis of the median sternotomy measuring up to 1.4 cm with intact sternotomy wires which could be postsurgical in nature versus sternal dehiscence. Correlation with operative report or prior CT chest examination would be useful to determine stability. CBC Full  -  ( 20 Jan 2020 16:17 )  WBC Count : 9.25 K/uL  RBC Count : 5.33 M/uL  Hemoglobin : 14.3 g/dL  Hematocrit : 49.4 %  Platelet Count - Automated : 153 K/uL  Mean Cell Volume : 92.7 fl  Mean Cell Hemoglobin : 26.8 pg  Mean Cell Hemoglobin Concentration : 28.9 gm/dL  Auto Neutrophil # : 7.07 K/uL  Auto Lymphocyte # : 1.09 K/uL  Auto Monocyte # : 0.86 K/uL  Auto Eosinophil # : 0.12 K/uL  Auto Basophil # : 0.04 K/uL  Auto Neutrophil % : 76.4 %  Auto Lymphocyte % : 11.8 %  Auto Monocyte % : 9.3 %  Auto Eosinophil % : 1.3 %  Auto Basophil % : 0.4 %    01-20    134<L>  |  91<L>  |  63<H>  ----------------------------<  229<H>  4.6   |  24  |  8.36<H>    Ca    9.5      20 Jan 2020 16:17    TPro  7.5  /  Alb  4.1  /  TBili  0.5  /  DBili  x   /  AST  12  /  ALT  19  /  AlkPhos  58  01-20    CTA chest:  1. Evaluation of the subsegmental pulmonary arteries in the lung bases are limited secondary to motion artifact. No pulmonary embolism in the remaining visualized pulmonary arteries.   2. Partially loculated small to moderate left pleural effusion with compressive atelectasis of part of the left lower lobe.  3. Diastasis of the median sternotomy measuring up to 1.4 cm with intact sternotomy wires which could be postsurgical in nature versus sternal dehiscence. Correlation with operative report or prior CT chest examination would be useful to determine stability.    EKG:   Aflutter with RBBB CBC Full  -  ( 20 Jan 2020 16:17 )  WBC Count : 9.25 K/uL  RBC Count : 5.33 M/uL  Hemoglobin : 14.3 g/dL  Hematocrit : 49.4 %  Platelet Count - Automated : 153 K/uL  Mean Cell Volume : 92.7 fl  Mean Cell Hemoglobin : 26.8 pg  Mean Cell Hemoglobin Concentration : 28.9 gm/dL  Auto Neutrophil # : 7.07 K/uL  Auto Lymphocyte # : 1.09 K/uL  Auto Monocyte # : 0.86 K/uL  Auto Eosinophil # : 0.12 K/uL  Auto Basophil # : 0.04 K/uL  Auto Neutrophil % : 76.4 %  Auto Lymphocyte % : 11.8 %  Auto Monocyte % : 9.3 %  Auto Eosinophil % : 1.3 %  Auto Basophil % : 0.4 %    01-20    134<L>  |  91<L>  |  63<H>  ----------------------------<  229<H>  4.6   |  24  |  8.36<H>    Ca    9.5      20 Jan 2020 16:17    TPro  7.5  /  Alb  4.1  /  TBili  0.5  /  DBili  x   /  AST  12  /  ALT  19  /  AlkPhos  58  01-20    CTA chest:  1. Evaluation of the subsegmental pulmonary arteries in the lung bases are limited secondary to motion artifact. No pulmonary embolism in the remaining visualized pulmonary arteries.   2. Partially loculated small to moderate left pleural effusion with compressive atelectasis of part of the left lower lobe.  3. Diastasis of the median sternotomy measuring up to 1.4 cm with intact sternotomy wires which could be postsurgical in nature versus sternal dehiscence. Correlation with operative report or prior CT chest examination would be useful to determine stability.    EKG:   Aflutter with RBBB    CXR:    Moderate left pleural effusion. Cannot exclude underlying infection. Labs, imaging and EKG personally reviewed and interpreted by me. EKG Afib/flutte with RBBB, no acute ischemic changes.     CBC Full  -  ( 20 Jan 2020 16:17 )  WBC Count : 9.25 K/uL  RBC Count : 5.33 M/uL  Hemoglobin : 14.3 g/dL  Hematocrit : 49.4 %  Platelet Count - Automated : 153 K/uL  Mean Cell Volume : 92.7 fl  Mean Cell Hemoglobin : 26.8 pg  Mean Cell Hemoglobin Concentration : 28.9 gm/dL  Auto Neutrophil # : 7.07 K/uL  Auto Lymphocyte # : 1.09 K/uL  Auto Monocyte # : 0.86 K/uL  Auto Eosinophil # : 0.12 K/uL  Auto Basophil # : 0.04 K/uL  Auto Neutrophil % : 76.4 %  Auto Lymphocyte % : 11.8 %  Auto Monocyte % : 9.3 %  Auto Eosinophil % : 1.3 %  Auto Basophil % : 0.4 %    01-20    134<L>  |  91<L>  |  63<H>  ----------------------------<  229<H>  4.6   |  24  |  8.36<H>    Ca    9.5      20 Jan 2020 16:17    TPro  7.5  /  Alb  4.1  /  TBili  0.5  /  DBili  x   /  AST  12  /  ALT  19  /  AlkPhos  58  01-20    CTA chest:  1. Evaluation of the subsegmental pulmonary arteries in the lung bases are limited secondary to motion artifact. No pulmonary embolism in the remaining visualized pulmonary arteries.   2. Partially loculated small to moderate left pleural effusion with compressive atelectasis of part of the left lower lobe.  3. Diastasis of the median sternotomy measuring up to 1.4 cm with intact sternotomy wires which could be postsurgical in nature versus sternal dehiscence. Correlation with operative report or prior CT chest examination would be useful to determine stability.    CXR:    Moderate left pleural effusion. Cannot exclude underlying infection.

## 2020-01-20 NOTE — H&P ADULT - PROBLEM SELECTOR PLAN 7
Continue BB. Patient on lantus 14 and humalog 5 tid. Will continue while admitted. Denies CP. Noted HS-trop elevation to 132, but now downtrending. Likely demand ischemic in setting of ESRD. CKMB was wnl. No ischemic changes on EKG. Old RBBB present.  - Low c/f ACS.  - c/w ASA and statin

## 2020-01-20 NOTE — ED PROVIDER NOTE - OBJECTIVE STATEMENT
John SAINZ MD PGY2: 69 M PMH CABG last September and ESRD on dialysis here for increased cough and weakness x 1 week. Feels like he can't sleep because he is coughing so much and it has been causing some associated chest pain. No edema in bilateral lower extremities. No overt chest pain, diaphoresis, nausea or vomiting. Due for dialysis tomorrow.

## 2020-01-20 NOTE — ED PROVIDER NOTE - CLINICAL SUMMARY MEDICAL DECISION MAKING FREE TEXT BOX
John SAINZ MD PGY2: Patient here with cough with concern for PNA vs viral syndrome. WIll obtain CXR, troponin and BNP given patient's cardiac history and will reevaluate.

## 2020-01-20 NOTE — H&P ADULT - ASSESSMENT
69 M with CAD s/p CABG in 9/2019, HTN, HLD, DM2, and ESRD on HD (Tu, Thurs, Sat), presenting from home with increased lethargy and cough for 1 month, admitted for acute hypoxia from volume overload 2/2 decompensated CHF or ESRD, meeting SIRS criteria. 69 M with CAD s/p CABG in 9/2019, HTN, HLD, DM2, Afib on coumadin and ESRD on HD (Tu, Thurs, Sat), presenting from home with increased lethargy and cough for 1 month, admitted for acute hypoxia from volume overload 2/2 decompensated CHF or ESRD, meeting SIRS criteria. 69 M with CAD s/p CABG in 9/2019, HTN, HLD, DM2, Afib on coumadin and ESRD on HD (Tu, Thurs, Sat), presenting from home with increased lethargy and cough for 1 month, admitted for acute hypoxia from volume overload 2/2 decompensated CHF or ESRD. 69M with CAD s/p multiple stents, recently s/p CABG (9/2019 course c/b Afib and LT pleural effusion), HTN, HLD, DM2, Afib on coumadin and ESRD on HD (Tu, Thurs, Sat) presenting from home with increased cough, HENLEY and lethargy, admitted for acute hypoxia, likely multifactorial from parainfluenza, L pleural effusion and volume overload from ESRD vs CHF.

## 2020-01-20 NOTE — H&P ADULT - PROBLEM SELECTOR PROBLEM 9
Prophylactic measure Type 2 diabetes mellitus with hyperglycemia, with long-term current use of insulin

## 2020-01-20 NOTE — H&P ADULT - PROBLEM SELECTOR PLAN 8
Patient on lantus 14 and humalog 5 tid. Will continue while admitted. DVT ppx: coumadin  carb-consistent renal diet Atrial fibrillation since CABG; currently remain in Afib, rate controlled   c/w metoprolol and amiodarone   INR subtherapeutic 1.74; dose coumadin tonight   monitor INR daily and dose coumadin as appropriate

## 2020-01-20 NOTE — H&P ADULT - ATTENDING COMMENTS
Pt seen and examined. Pt with recent CABG in 9/2019, c/b Afib and Lt pleural effusion requiring chest tube, p/w worsening HENLEY, cough x 1month, orthopnea, weakness and syncopal episode 1 week prior to admission. Found to be hypoxic, with +paraflu, +reaccumulation of lt pleural effusion and hypervolemic on exam. Unable to diurese as pt anuric, renal consulted and plan for HD and volume removal in AM; will consult thoracic for possible thoracentesis vs chest tube, supportive care for viral illness; c/w O2 as needed, wean as tolerated. Check CT head given syncope last week, although low suspicion for acute pathology.    Patient assigned to me by night hospitalist in charge for management and care for patient for this evening only. Care to be resumed by day hospitalist in the morning and thereafter.     Pt seen and examined. Case d/w house staff Dr. Carpenter. Agree with assessment and plan, with changes made where appropriate. Pt seen and examined. Pt with recent CABG in 9/2019, c/b Afib and Lt pleural effusion requiring chest tube, p/w worsening HENLEY, cough x 1month, orthopnea, weakness and syncopal episode 1 week prior to admission. Found to be hypoxic, with +paraflu, +reaccumulation of lt pleural effusion and hypervolemic on exam. Unable to diurese as pt anuric, renal consulted and plan for HD and volume removal in AM; will need thoracic consult in AM for possible thoracentesis vs chest tube, supportive care for viral illness; c/w O2 as needed, wean as tolerated. Check CT head given syncope last week, although low suspicion for acute pathology.    Patient assigned to me by night hospitalist in charge for management and care for patient for this evening only. Care to be resumed by day hospitalist in the morning and thereafter.     Pt seen and examined. Case d/w house staff Dr. Carpenter. Agree with assessment and plan, with changes made where appropriate.

## 2020-01-20 NOTE — ED PROVIDER NOTE - PROGRESS NOTE DETAILS
John SAINZ MD PGY2: CXR shows L pleural effusion. Will obtain CTA to eval for PE vs PNA as cause of effusion, although with elevated BNP possible that pleural effusion is due to cardiac decompensation. Patient currently comfortable and stable. received s/o on pt. Patient reassessed, NAD, non-toxic appearing. results dw pt/family, questions answered. pt unsure who is nephrologist.  - Eulogio Mackey D.O. PGY2 received s/o on pt. Patient reassessed, NAD, non-toxic appearing. results dw pt/family, questions answered. Dr Mackey of [facility] paged at [phone]  - Eulogio Mackey D.O. PGY2

## 2020-01-21 DIAGNOSIS — R09.02 HYPOXEMIA: ICD-10-CM

## 2020-01-21 DIAGNOSIS — I48.91 UNSPECIFIED ATRIAL FIBRILLATION: ICD-10-CM

## 2020-01-21 DIAGNOSIS — Z29.9 ENCOUNTER FOR PROPHYLACTIC MEASURES, UNSPECIFIED: ICD-10-CM

## 2020-01-21 DIAGNOSIS — E11.65 TYPE 2 DIABETES MELLITUS WITH HYPERGLYCEMIA: ICD-10-CM

## 2020-01-21 DIAGNOSIS — I48.19 OTHER PERSISTENT ATRIAL FIBRILLATION: ICD-10-CM

## 2020-01-21 DIAGNOSIS — I10 ESSENTIAL (PRIMARY) HYPERTENSION: ICD-10-CM

## 2020-01-21 DIAGNOSIS — B34.8 OTHER VIRAL INFECTIONS OF UNSPECIFIED SITE: ICD-10-CM

## 2020-01-21 DIAGNOSIS — I25.10 ATHEROSCLEROTIC HEART DISEASE OF NATIVE CORONARY ARTERY WITHOUT ANGINA PECTORIS: ICD-10-CM

## 2020-01-21 DIAGNOSIS — J90 PLEURAL EFFUSION, NOT ELSEWHERE CLASSIFIED: ICD-10-CM

## 2020-01-21 DIAGNOSIS — R55 SYNCOPE AND COLLAPSE: ICD-10-CM

## 2020-01-21 DIAGNOSIS — E11.8 TYPE 2 DIABETES MELLITUS WITH UNSPECIFIED COMPLICATIONS: ICD-10-CM

## 2020-01-21 DIAGNOSIS — Z02.9 ENCOUNTER FOR ADMINISTRATIVE EXAMINATIONS, UNSPECIFIED: ICD-10-CM

## 2020-01-21 DIAGNOSIS — R65.10 SYSTEMIC INFLAMMATORY RESPONSE SYNDROME (SIRS) OF NON-INFECTIOUS ORIGIN WITHOUT ACUTE ORGAN DYSFUNCTION: ICD-10-CM

## 2020-01-21 DIAGNOSIS — J96.01 ACUTE RESPIRATORY FAILURE WITH HYPOXIA: ICD-10-CM

## 2020-01-21 DIAGNOSIS — N18.6 END STAGE RENAL DISEASE: ICD-10-CM

## 2020-01-21 LAB
ANION GAP SERPL CALC-SCNC: 19 MMOL/L — HIGH (ref 5–17)
APTT BLD: 32.8 SEC — SIGNIFICANT CHANGE UP (ref 27.5–36.3)
BASE EXCESS BLDV CALC-SCNC: -0.9 MMOL/L — SIGNIFICANT CHANGE UP (ref -2–2)
BUN SERPL-MCNC: 69 MG/DL — HIGH (ref 7–23)
CALCIUM SERPL-MCNC: 9.1 MG/DL — SIGNIFICANT CHANGE UP (ref 8.4–10.5)
CHLORIDE SERPL-SCNC: 90 MMOL/L — LOW (ref 96–108)
CO2 BLDV-SCNC: 29 MMOL/L — SIGNIFICANT CHANGE UP (ref 22–30)
CO2 SERPL-SCNC: 22 MMOL/L — SIGNIFICANT CHANGE UP (ref 22–31)
CREAT SERPL-MCNC: 9.07 MG/DL — HIGH (ref 0.5–1.3)
GAS PNL BLDV: SIGNIFICANT CHANGE UP
GLUCOSE BLDC GLUCOMTR-MCNC: 102 MG/DL — HIGH (ref 70–99)
GLUCOSE BLDC GLUCOMTR-MCNC: 125 MG/DL — HIGH (ref 70–99)
GLUCOSE BLDC GLUCOMTR-MCNC: 138 MG/DL — HIGH (ref 70–99)
GLUCOSE BLDC GLUCOMTR-MCNC: 153 MG/DL — HIGH (ref 70–99)
GLUCOSE SERPL-MCNC: 176 MG/DL — HIGH (ref 70–99)
HBA1C BLD-MCNC: 8.2 % — HIGH (ref 4–5.6)
HCO3 BLDV-SCNC: 27 MMOL/L — SIGNIFICANT CHANGE UP (ref 21–29)
HCT VFR BLD CALC: 44.2 % — SIGNIFICANT CHANGE UP (ref 39–50)
HGB BLD-MCNC: 13.2 G/DL — SIGNIFICANT CHANGE UP (ref 13–17)
HOROWITZ INDEX BLDV+IHG-RTO: SIGNIFICANT CHANGE UP
HPIV4 RNA SPEC QL NAA+PROBE: DETECTED
INR BLD: 1.74 RATIO — HIGH (ref 0.88–1.16)
LACTATE BLDV-MCNC: 1.5 MMOL/L — SIGNIFICANT CHANGE UP (ref 0.7–2)
MAGNESIUM SERPL-MCNC: 2.2 MG/DL — SIGNIFICANT CHANGE UP (ref 1.6–2.6)
MCHC RBC-ENTMCNC: 27.4 PG — SIGNIFICANT CHANGE UP (ref 27–34)
MCHC RBC-ENTMCNC: 29.9 GM/DL — LOW (ref 32–36)
MCV RBC AUTO: 91.9 FL — SIGNIFICANT CHANGE UP (ref 80–100)
PCO2 BLDV: 64 MMHG — HIGH (ref 35–50)
PH BLDV: 7.26 — LOW (ref 7.35–7.45)
PHOSPHATE SERPL-MCNC: 6.9 MG/DL — HIGH (ref 2.5–4.5)
PLATELET # BLD AUTO: 148 K/UL — LOW (ref 150–400)
PO2 BLDV: 35 MMHG — SIGNIFICANT CHANGE UP (ref 25–45)
POTASSIUM SERPL-MCNC: 4.8 MMOL/L — SIGNIFICANT CHANGE UP (ref 3.5–5.3)
POTASSIUM SERPL-SCNC: 4.8 MMOL/L — SIGNIFICANT CHANGE UP (ref 3.5–5.3)
PROTHROM AB SERPL-ACNC: 20.4 SEC — HIGH (ref 10–12.9)
RAPID RVP RESULT: DETECTED
RBC # BLD: 4.81 M/UL — SIGNIFICANT CHANGE UP (ref 4.2–5.8)
RBC # FLD: 16 % — HIGH (ref 10.3–14.5)
SAO2 % BLDV: 50 % — LOW (ref 67–88)
SODIUM SERPL-SCNC: 131 MMOL/L — LOW (ref 135–145)
WBC # BLD: 8.77 K/UL — SIGNIFICANT CHANGE UP (ref 3.8–10.5)
WBC # FLD AUTO: 8.77 K/UL — SIGNIFICANT CHANGE UP (ref 3.8–10.5)

## 2020-01-21 PROCEDURE — 99233 SBSQ HOSP IP/OBS HIGH 50: CPT

## 2020-01-21 PROCEDURE — 70551 MRI BRAIN STEM W/O DYE: CPT | Mod: 26

## 2020-01-21 PROCEDURE — 70450 CT HEAD/BRAIN W/O DYE: CPT | Mod: 26

## 2020-01-21 RX ORDER — ACETAMINOPHEN 500 MG
650 TABLET ORAL EVERY 6 HOURS
Refills: 0 | Status: DISCONTINUED | OUTPATIENT
Start: 2020-01-21 | End: 2020-01-28

## 2020-01-21 RX ORDER — IPRATROPIUM/ALBUTEROL SULFATE 18-103MCG
3 AEROSOL WITH ADAPTER (GRAM) INHALATION EVERY 6 HOURS
Refills: 0 | Status: DISCONTINUED | OUTPATIENT
Start: 2020-01-21 | End: 2020-01-24

## 2020-01-21 RX ORDER — METOPROLOL TARTRATE 50 MG
100 TABLET ORAL DAILY
Refills: 0 | Status: DISCONTINUED | OUTPATIENT
Start: 2020-01-21 | End: 2020-01-28

## 2020-01-21 RX ORDER — WARFARIN SODIUM 2.5 MG/1
6 TABLET ORAL ONCE
Refills: 0 | Status: COMPLETED | OUTPATIENT
Start: 2020-01-21 | End: 2020-01-21

## 2020-01-21 RX ADMIN — PANTOPRAZOLE SODIUM 40 MILLIGRAM(S): 20 TABLET, DELAYED RELEASE ORAL at 08:33

## 2020-01-21 RX ADMIN — Medication 667 MILLIGRAM(S): at 22:47

## 2020-01-21 RX ADMIN — Medication 81 MILLIGRAM(S): at 00:07

## 2020-01-21 RX ADMIN — Medication 3 MILLILITER(S): at 06:00

## 2020-01-21 RX ADMIN — GLYCOPYRROLATE AND FORMOTEROL FUMARATE 2 PUFF(S): 9; 4.8 AEROSOL, METERED RESPIRATORY (INHALATION) at 06:02

## 2020-01-21 RX ADMIN — INSULIN GLARGINE 14 UNIT(S): 100 INJECTION, SOLUTION SUBCUTANEOUS at 00:05

## 2020-01-21 RX ADMIN — Medication 667 MILLIGRAM(S): at 08:30

## 2020-01-21 RX ADMIN — AMIODARONE HYDROCHLORIDE 200 MILLIGRAM(S): 400 TABLET ORAL at 00:07

## 2020-01-21 RX ADMIN — Medication 5 UNIT(S): at 09:55

## 2020-01-21 RX ADMIN — AMIODARONE HYDROCHLORIDE 200 MILLIGRAM(S): 400 TABLET ORAL at 08:30

## 2020-01-21 RX ADMIN — WARFARIN SODIUM 6 MILLIGRAM(S): 2.5 TABLET ORAL at 23:35

## 2020-01-21 RX ADMIN — Medication 100 MILLIGRAM(S): at 06:01

## 2020-01-21 RX ADMIN — Medication 5 UNIT(S): at 13:21

## 2020-01-21 RX ADMIN — SIMVASTATIN 40 MILLIGRAM(S): 20 TABLET, FILM COATED ORAL at 00:07

## 2020-01-21 RX ADMIN — Medication 81 MILLIGRAM(S): at 22:46

## 2020-01-21 RX ADMIN — SIMVASTATIN 40 MILLIGRAM(S): 20 TABLET, FILM COATED ORAL at 22:46

## 2020-01-21 RX ADMIN — KETOTIFEN FUMARATE 1 DROP(S): 0.34 SOLUTION OPHTHALMIC at 22:48

## 2020-01-21 RX ADMIN — INSULIN GLARGINE 14 UNIT(S): 100 INJECTION, SOLUTION SUBCUTANEOUS at 22:50

## 2020-01-21 RX ADMIN — Medication 3 MILLILITER(S): at 23:03

## 2020-01-21 RX ADMIN — Medication 100 MILLIGRAM(S): at 06:00

## 2020-01-21 RX ADMIN — Medication 5 UNIT(S): at 19:38

## 2020-01-21 RX ADMIN — Medication 2: at 13:21

## 2020-01-21 NOTE — PROGRESS NOTE ADULT - PROBLEM SELECTOR PLAN 2
pt noted with moderate L pleural effusion, which is likely contributing to pts symptoms   on review of previous records, pt had post-surgical L pleural effusion requiring chest tube   pt with plan for HD with fluid removal tomorrow; c/t monitor and assess if repeat chest tube needed - consult thoracic surgery in AM pt noted with moderate L pleural effusion, which is likely contributing to pts symptoms   on review of previous records, pt had post-surgical L pleural effusion requiring chest tube   Might need to repeat CXR or CT without contrast post  HD with fluid removal to reassess for the pleural effusion and might need to call Pulm or consult thoracic surgery if needed

## 2020-01-21 NOTE — PROGRESS NOTE ADULT - PROBLEM SELECTOR PLAN 10
1.  Name of PCP: Dr Jessy Méndez   2.  PCP Contacted on Admission: [ ] Y    [ ] N    3.  PCP contacted at Discharge: [ ] Y    [ ] N    [ ] N/A  4.  Post-Discharge Appointment Date and Location:  5.  Summary of Handoff given to PCP: 1.  Name of PCP: Dr Jessy Méndez   2.  PCP Contacted on Admission: [ ] Y    [ X] N    3.  PCP contacted at Discharge: [ ] Y    [ ] N    [ ] N/A  4.  Post-Discharge Appointment Date and Location:  5.  Summary of Handoff given to PCP:

## 2020-01-21 NOTE — PROGRESS NOTE ADULT - SUBJECTIVE AND OBJECTIVE BOX
Patient is a 69y old  Male who presents with a chief complaint of lethargy, coughing, SOB (21 Jan 2020 10:41)      SUBJECTIVE / OVERNIGHT EVENTS:    Tele reviewed:       ADDITIONAL REVIEW OF SYSTEMS: Negative except for above    MEDICATIONS  (STANDING):  albuterol/ipratropium for Nebulization 3 milliLiter(s) Nebulizer every 6 hours  aMIOdarone    Tablet 200 milliGRAM(s) Oral daily  aspirin enteric coated 81 milliGRAM(s) Oral daily  calcium acetate 667 milliGRAM(s) Oral three times a day with meals  dextrose 5%. 1000 milliLiter(s) (50 mL/Hr) IV Continuous <Continuous>  dextrose 50% Injectable 12.5 Gram(s) IV Push once  dextrose 50% Injectable 25 Gram(s) IV Push once  dextrose 50% Injectable 25 Gram(s) IV Push once  glycopyrrolate 9 MICROgram(s)/formoterol 4.8 MICROgram(s) Inhaler 2 Puff(s) Inhalation two times a day  insulin glargine Injectable (LANTUS) 14 Unit(s) SubCutaneous at bedtime  insulin lispro (HumaLOG) corrective regimen sliding scale   SubCutaneous three times a day before meals  insulin lispro (HumaLOG) corrective regimen sliding scale   SubCutaneous at bedtime  insulin lispro Injectable (HumaLOG) 5 Unit(s) SubCutaneous three times a day before meals  ketotifen 0.025% Ophthalmic Solution 1 Drop(s) Both EYES daily  methimazole 5 milliGRAM(s) Oral daily  metoprolol succinate  milliGRAM(s) Oral daily  pantoprazole    Tablet 40 milliGRAM(s) Oral before breakfast  simvastatin 40 milliGRAM(s) Oral at bedtime  tiotropium 18 MICROgram(s) Capsule 1 Capsule(s) Inhalation daily    MEDICATIONS  (PRN):  acetaminophen   Tablet .. 650 milliGRAM(s) Oral every 6 hours PRN Temp greater or equal to 38C (100.4F), Mild Pain (1 - 3)  dextrose 40% Gel 15 Gram(s) Oral once PRN Blood Glucose LESS THAN 70 milliGRAM(s)/deciliter  glucagon  Injectable 1 milliGRAM(s) IntraMuscular once PRN Glucose LESS THAN 70 milligrams/deciliter  guaiFENesin   Syrup  (Sugar-Free) 100 milliGRAM(s) Oral every 6 hours PRN Cough      CAPILLARY BLOOD GLUCOSE      POCT Blood Glucose.: 138 mg/dL (21 Jan 2020 09:05)  POCT Blood Glucose.: 135 mg/dL (20 Jan 2020 23:51)    I&O's Summary      PHYSICAL EXAM:  Vital Signs Last 24 Hrs  T(C): 36.3 (21 Jan 2020 08:36), Max: 37 (21 Jan 2020 04:49)  T(F): 97.4 (21 Jan 2020 08:36), Max: 98.6 (21 Jan 2020 04:49)  HR: 99 (21 Jan 2020 08:36) (72 - 110)  BP: 134/76 (21 Jan 2020 08:36) (124/67 - 148/82)  BP(mean): 85 (21 Jan 2020 05:54) (83 - 85)  RR: 20 (21 Jan 2020 08:36) (16 - 20)  SpO2: 99% (21 Jan 2020 08:36) (89% - 99%)    PHYSICAL EXAM:        LABS:                        13.2   8.77  )-----------( 148      ( 21 Jan 2020 07:28 )             44.2     01-21    131<L>  |  90<L>  |  69<H>  ----------------------------<  176<H>  4.8   |  22  |  9.07<H>    Ca    9.1      21 Jan 2020 06:09  Phos  6.9     01-21  Mg     2.2     01-21    TPro  7.5  /  Alb  4.1  /  TBili  0.5  /  DBili  x   /  AST  12  /  ALT  19  /  AlkPhos  58  01-20    PT/INR - ( 21 Jan 2020 00:55 )   PT: 20.4 sec;   INR: 1.74 ratio         PTT - ( 21 Jan 2020 00:55 )  PTT:32.8 sec  CARDIAC MARKERS ( 20 Jan 2020 16:17 )  x     / x     / x     / x     / 5.2 ng/mL            RADIOLOGY & ADDITIONAL TESTS:    Imaging Personally Reviewed:    Electrocardiogram Personally Reviewed:    COORDINATION OF CARE:  Care Discussed with Consultants/Other Providers [Y/N]:  Prior or Outpatient Records Reviewed [Y/N]: Patient is a 69y old  Male who presents with a chief complaint of lethargy, coughing, SOB (21 Jan 2020 10:41)      SUBJECTIVE / OVERNIGHT EVENTS:    69M with CAD s/p multiple stents, recently s/p CABG (9/2019 course c/b Afib and LT pleural effusion), HTN, HLD, DM2, Afib on coumadin and ESRD on HD (Tu, Thurs, Sat) presenting from home with increased lethargy and cough. Pt states he has been having SOB and LE edema since his surgery in September and has not returned to his baseline. Started having a cough about 3-4 weeks ago that has been persistent, non productive. Cough and SOB have gotten worse in the past 7-10 days; cough is so bad it is keeping patient awake at night, cannot walk further than 10-15 feet, which is even worse than his baseline post-surgery.    Patient was found to have Parainfluenza virus.        ADDITIONAL REVIEW OF SYSTEMS: Negative except for above    MEDICATIONS  (STANDING):  albuterol/ipratropium for Nebulization 3 milliLiter(s) Nebulizer every 6 hours  aMIOdarone    Tablet 200 milliGRAM(s) Oral daily  aspirin enteric coated 81 milliGRAM(s) Oral daily  calcium acetate 667 milliGRAM(s) Oral three times a day with meals  dextrose 5%. 1000 milliLiter(s) (50 mL/Hr) IV Continuous <Continuous>  dextrose 50% Injectable 12.5 Gram(s) IV Push once  dextrose 50% Injectable 25 Gram(s) IV Push once  dextrose 50% Injectable 25 Gram(s) IV Push once  glycopyrrolate 9 MICROgram(s)/formoterol 4.8 MICROgram(s) Inhaler 2 Puff(s) Inhalation two times a day  insulin glargine Injectable (LANTUS) 14 Unit(s) SubCutaneous at bedtime  insulin lispro (HumaLOG) corrective regimen sliding scale   SubCutaneous three times a day before meals  insulin lispro (HumaLOG) corrective regimen sliding scale   SubCutaneous at bedtime  insulin lispro Injectable (HumaLOG) 5 Unit(s) SubCutaneous three times a day before meals  ketotifen 0.025% Ophthalmic Solution 1 Drop(s) Both EYES daily  methimazole 5 milliGRAM(s) Oral daily  metoprolol succinate  milliGRAM(s) Oral daily  pantoprazole    Tablet 40 milliGRAM(s) Oral before breakfast  simvastatin 40 milliGRAM(s) Oral at bedtime  tiotropium 18 MICROgram(s) Capsule 1 Capsule(s) Inhalation daily    MEDICATIONS  (PRN):  acetaminophen   Tablet .. 650 milliGRAM(s) Oral every 6 hours PRN Temp greater or equal to 38C (100.4F), Mild Pain (1 - 3)  dextrose 40% Gel 15 Gram(s) Oral once PRN Blood Glucose LESS THAN 70 milliGRAM(s)/deciliter  glucagon  Injectable 1 milliGRAM(s) IntraMuscular once PRN Glucose LESS THAN 70 milligrams/deciliter  guaiFENesin   Syrup  (Sugar-Free) 100 milliGRAM(s) Oral every 6 hours PRN Cough      CAPILLARY BLOOD GLUCOSE      POCT Blood Glucose.: 138 mg/dL (21 Jan 2020 09:05)  POCT Blood Glucose.: 135 mg/dL (20 Jan 2020 23:51)    I&O's Summary      PHYSICAL EXAM:  Vital Signs Last 24 Hrs  T(C): 36.3 (21 Jan 2020 08:36), Max: 37 (21 Jan 2020 04:49)  T(F): 97.4 (21 Jan 2020 08:36), Max: 98.6 (21 Jan 2020 04:49)  HR: 99 (21 Jan 2020 08:36) (72 - 110)  BP: 134/76 (21 Jan 2020 08:36) (124/67 - 148/82)  BP(mean): 85 (21 Jan 2020 05:54) (83 - 85)  RR: 20 (21 Jan 2020 08:36) (16 - 20)  SpO2: 99% (21 Jan 2020 08:36) (89% - 99%)    PHYSICAL EXAM:        LABS:                        13.2   8.77  )-----------( 148      ( 21 Jan 2020 07:28 )             44.2     01-21    131<L>  |  90<L>  |  69<H>  ----------------------------<  176<H>  4.8   |  22  |  9.07<H>    Ca    9.1      21 Jan 2020 06:09  Phos  6.9     01-21  Mg     2.2     01-21    TPro  7.5  /  Alb  4.1  /  TBili  0.5  /  DBili  x   /  AST  12  /  ALT  19  /  AlkPhos  58  01-20    PT/INR - ( 21 Jan 2020 00:55 )   PT: 20.4 sec;   INR: 1.74 ratio         PTT - ( 21 Jan 2020 00:55 )  PTT:32.8 sec  CARDIAC MARKERS ( 20 Jan 2020 16:17 )  x     / x     / x     / x     / 5.2 ng/mL            RADIOLOGY & ADDITIONAL TESTS:    Imaging Personally Reviewed:    Electrocardiogram Personally Reviewed:    COORDINATION OF CARE:  Care Discussed with Consultants/Other Providers [Y/N]:  Prior or Outpatient Records Reviewed [Y/N]:

## 2020-01-21 NOTE — PROGRESS NOTE ADULT - PROBLEM SELECTOR PLAN 4
pt with syncopal episode one week prior to admission  wife who witnessed fall states no head strike; however, given pt on coumadin  CT head  of head with hypodense area noted / MRI without contrast is ordered / neuro consult is called  monitor on tele   trop downtrending, no acute ischemic changes, RBBB on EKG noted on prior EKGs   check TTE pt with syncopal episode one week prior to admission as he states   as reported, wife who witnessed fall states no head strike; however, given pt on coumadin CT head  was done and showed hypodense area noted / MRI without contrast is ordered / neuro consult is called  monitor on tele   trop downtrending, no acute ischemic changes, RBBB on EKG noted on prior EKGs   F/UP  TTE

## 2020-01-21 NOTE — PROGRESS NOTE ADULT - PROBLEM SELECTOR PLAN 8
Atrial fibrillation since CABG; currently remain in Afib, rate controlled   c/w metoprolol and amiodarone   INR subtherapeutic 1.74; dose coumadin tonight   monitor INR daily and dose coumadin as appropriate Atrial fibrillation since CABG; currently remain in Afib, rate controlled   c/w metoprolol and amiodarone   INR subtherapeutic 1.74/ coumadin 6 mg oral tonight ( dose verified by patient and his pharmacy) / INR in AM

## 2020-01-21 NOTE — PROGRESS NOTE ADULT - PROBLEM SELECTOR PLAN 6
hypoxia and volume overload, possbly 2/2 ESRD   no electrlyte abnormalities, does not require urgent HD overnight   Plan for HD tomorrow. Renal consulted overnight. hypoxia and volume overload, possbly 2/2 ESRD   no electrlyte abnormalities   HD on 1/21

## 2020-01-21 NOTE — PROGRESS NOTE ADULT - PROBLEM SELECTOR PLAN 1
Likely multifactorial from parainfluenza, pleural effusion and from volume overload  from new decompensated CHF (BNP 36,000 and CAD) or ESRD  hypoxic to 88% RA, not on home O2; now on 3LNC, saturating well, no respiratory distress   L pleural effusion on CXR, b/l LE edema; however, does not make urine so no role for diuresis   Renal was consulted in ED. HD orders placed for tomorrow. No urgent need for HD at this time   titrate O2 for sat >92% Likely multifactorial from parainfluenza, pleural effusion and from volume overload  from new decompensated CHF (BNP 36,000 and CAD) or ESRD  hypoxic to 88% RA, not on home O2; now on 3LNC, saturating well, no respiratory distress   L pleural effusion on CXR, b/l LE edema; however, does not make urine so no role for diuresis   Renal has seen patient and is having HD today   titrate O2 for sat >92%  Titrate down as tolerated

## 2020-01-21 NOTE — CONSULT NOTE ADULT - ASSESSMENT
69y Male w CAD s/p multiple stents, recently s/p CABG (9/2019 course c/b Afib and LT pleural effusion), HTN, DM2, Afib on coumadin and ESRD on HD (Tu, Thurs, Sat) presenting from home with cough and SOB. +parainfluenza. With likely volume excess and pulm edema. Neg PE on CTA

## 2020-01-21 NOTE — CONSULT NOTE ADULT - ASSESSMENT
Assessment: 69 year old right handed M with CAD s/p multiple stents, recently s/p CABG (9/2019 course c/b Afib and LT pleural effusion), HTN, HLD, DM2, Afib on coumadin and ESRD on HD (Tu, Thurs, Sat) presenting from home with increased lethargy and cough. Initial vital signs significant for a BP of 148/82. Physical exam significant for left pronator drift/left Babinski. CTH w/o showed right parieto-occipital and right frontal-parietal subacute to chronic infarcts.     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 Zacarias, [phone])    Case to be discussed with stroke attending, Dr. Zacarias

## 2020-01-21 NOTE — CONSULT NOTE ADULT - SUBJECTIVE AND OBJECTIVE BOX
HPI:  69 year old right handed M with CAD s/p multiple stents, recently s/p CABG (9/2019 course c/b Afib and LT pleural effusion), HTN, HLD, DM2, Afib on coumadin and ESRD on HD (Tu, Thurs, Sat) presenting from home with increased lethargy and cough. Pt states he has been having SOB and LE edema since his surgery in September and has not returned to his baseline. Started having a cough about 3-4 weeks ago that has been persistent, non productive. Cough and SOB have gotten worse in the past 7-10 days; cough is so bad it is keeping patient awake at night, cannot walk further than 10-15 feet, which is even worse than his baseline post-surgery. LE edema has been at baseline, but pt endorses worsening orthopnea (required 2 pillows pre-surgery, 3 post-surgery, no requiring 4 pillows or sits up in bed to sleep). P also with worsening weakness and fatigue, and last week patient also had a syncopal episode, where pt was walking and lost consciousness, no head strike as pt was caught before going to the floor. Pt denies any prodromal dizziness, CP, palpitations, nausea/vomiting, diaphoresis. Pt denies recent fevers or chills, +rhinorrhea, no CP, no palpitation, no n/v/abdominal pain, no diarrhea, does not make urine. +sick contacts (niece and her whole family have been sick with URI symptoms in past 2 weeks). Saw PMD recently regarding symptoms, who recommended coming to ED. Patient is not on O2 at home. Last HD was on Sat; 3L were removed.   In ED, patient was tachycardic to  and hypoxic to 88% on RA, now satting well on 3L NC. Small to moderate L pleural effusion noted on CT chest. (20 Jan 2020 22:37)    Neurology consulted: History as above with the following additions. Patient was walking at 8 am when he suddenly "collapsed" while holding onto the railing. He had been walking about 20 feet. He did not lose consciousness. He denies dizziness, chest pain, lower extremity pain, focal weakness, vision changes, speech changes, or dysphagia. He endorses shortness of breath.    NIHSS: 0  MRS: 0    REVIEW OF SYSTEMS	    A 10-system ROS was performed and is negative except for those items noted above and/or in the HPI.    PAST MEDICAL & SURGICAL HISTORY:  ESRD (end stage renal disease) on dialysis: T-Th-Sat  Stented coronary artery: total 15 stents from 8496-2819  Hyperthyroidism  H/O: CVA: after cardiac stent placed 12/15/09 -no residual  Heart Attack: 2/1/07 with subsequent stent  Coronary Stent: [id number] 10 stents,  to Ramus 1/2015, cath 01/2016 ( see results in HPI)  Hypercholesterolemia  CAD (Coronary Artery Disease)  DM (Diabetes Mellitus): x 4 yrs without N/N/R  HTN (Hypertension)  Hemodialysis access, AV graft: 5/2015, L arm  S/P cataract extraction OU  Boil of Buttock: 2006 and 2008    FAMILY HISTORY:  Family history of coronary artery disease  Family history of diabetes mellitus (Sibling)    SOCIAL HISTORY:   T/E/D:   Occupation:   Lives with:     MEDICATIONS (HOME):  Home Medications:  azelastine 0.05% ophthalmic solution: 1 drop(s) to each affected eye 2 times a day (20 Jan 2020 23:37)  Bevespi Aerosphere 9 mcg-4.8 mcg/inh inhalation aerosol: 2 puff(s) inhaled 2 times a day (20 Jan 2020 23:37)  calcium acetate 667 mg oral tablet: 1 tab(s) orally 3 times a day (20 Jan 2020 23:37)  omeprazole 40 mg oral delayed release capsule: 1 cap(s) orally once a day (20 Jan 2020 23:37)  ProAir RespiClick 90 mcg/inh inhalation powder: 2 puff(s) inhaled every 4 hours, As Needed (20 Jan 2020 23:37)  Spiriva Respimat: 2.5 microgram(s) inhaled 2 times a day (20 Jan 2020 23:37)    MEDICATIONS  (STANDING):  albuterol/ipratropium for Nebulization 3 milliLiter(s) Nebulizer every 6 hours  aMIOdarone    Tablet 200 milliGRAM(s) Oral daily  aspirin enteric coated 81 milliGRAM(s) Oral daily  calcium acetate 667 milliGRAM(s) Oral three times a day with meals  dextrose 5%. 1000 milliLiter(s) (50 mL/Hr) IV Continuous <Continuous>  dextrose 50% Injectable 12.5 Gram(s) IV Push once  dextrose 50% Injectable 25 Gram(s) IV Push once  dextrose 50% Injectable 25 Gram(s) IV Push once  glycopyrrolate 9 MICROgram(s)/formoterol 4.8 MICROgram(s) Inhaler 2 Puff(s) Inhalation two times a day  insulin glargine Injectable (LANTUS) 14 Unit(s) SubCutaneous at bedtime  insulin lispro (HumaLOG) corrective regimen sliding scale   SubCutaneous three times a day before meals  insulin lispro (HumaLOG) corrective regimen sliding scale   SubCutaneous at bedtime  insulin lispro Injectable (HumaLOG) 5 Unit(s) SubCutaneous three times a day before meals  ketotifen 0.025% Ophthalmic Solution 1 Drop(s) Both EYES daily  methimazole 5 milliGRAM(s) Oral daily  metoprolol succinate  milliGRAM(s) Oral daily  pantoprazole    Tablet 40 milliGRAM(s) Oral before breakfast  simvastatin 40 milliGRAM(s) Oral at bedtime  tiotropium 18 MICROgram(s) Capsule 1 Capsule(s) Inhalation daily    MEDICATIONS  (PRN):  acetaminophen   Tablet .. 650 milliGRAM(s) Oral every 6 hours PRN Temp greater or equal to 38C (100.4F), Mild Pain (1 - 3)  dextrose 40% Gel 15 Gram(s) Oral once PRN Blood Glucose LESS THAN 70 milliGRAM(s)/deciliter  glucagon  Injectable 1 milliGRAM(s) IntraMuscular once PRN Glucose LESS THAN 70 milligrams/deciliter  guaiFENesin   Syrup  (Sugar-Free) 100 milliGRAM(s) Oral every 6 hours PRN Cough    ALLERGIES/INTOLERANCES:  Allergies  lisinopril (Other)    Intolerances    VITALS & EXAMINATION:  Vital Signs Last 24 Hrs  T(C): 36.3 (21 Jan 2020 08:36), Max: 37 (21 Jan 2020 04:49)  T(F): 97.4 (21 Jan 2020 08:36), Max: 98.6 (21 Jan 2020 04:49)  HR: 99 (21 Jan 2020 08:36) (72 - 110)  BP: 134/76 (21 Jan 2020 08:36) (124/67 - 148/82)  BP(mean): 85 (21 Jan 2020 05:54) (83 - 85)  RR: 20 (21 Jan 2020 08:36) (16 - 20)  SpO2: 99% (21 Jan 2020 08:36) (89% - 99%)    General: Male, appears stated age, in no apparent distress including pain, on nasal cannula    Extremities: bilateral lower extremity edema, AVF in the left arm    Neurological (>12):  MS: Awake, alert, oriented to person, place, situation, time. Normal affect. Follows all commands, including crossed commands.    Language: Speech is clear, fluent with good repetition & comprehension (able to name objects)    CNs: PERRL (R = 3mm, L = 3mm). VFF. mild abduction deficit OU, no nystagmus, no diplopia. V1-3 intact to LT. No facial asymmetry b/l, full eye closure strength b/l. Hearing grossly normal (rubbing fingers) b/l. Symmetric palate elevation in midline. Gag reflex deferred. Head turning & shoulder shrug intact b/l. Tongue midline, normal movements, no atrophy.    Fundoscopic: deferred      Motor: Normal muscle bulk & tone. No noticeable tremor or seizure. Left pronator drift.              Deltoid	Biceps	Triceps	Wrist	 	  R	5	5	5	5	5		 	  L	5	5	5	5	5		    	H-Flex	H-Ext			K-Flex	K-Ext	D-Flex	P-Flex  R	5	5			5	5	5	5 	   L	5	5			5	5	5	5	     Sensation: Intact to LT b/l throughout.     Cortical: Extinction on DSS (neglect): none    Reflexes: (limited)              Biceps(C5)       BR(C6)     Triceps(C7)               Patellar(L4)    Achilles(S1)    Plantar Resp  R	1	          1	             1		        2		    1		Down   L	1	          1	             1		        2		    1		Up    Coordination: No dysmetria to FTN/HTS    Gait: Normal Romberg. No postural instability. Normal stance and gait.     LABORATORY:  CBC                       13.2   8.77  )-----------( 148      ( 21 Jan 2020 07:28 )             44.2     Chem 01-21    131<L>  |  90<L>  |  69<H>  ----------------------------<  176<H>  4.8   |  22  |  9.07<H>    Ca    9.1      21 Jan 2020 06:09  Phos  6.9     01-21  Mg     2.2     01-21    TPro  7.5  /  Alb  4.1  /  TBili  0.5  /  DBili  x   /  AST  12  /  ALT  19  /  AlkPhos  58  01-20    LFTs LIVER FUNCTIONS - ( 20 Jan 2020 16:17 )  Alb: 4.1 g/dL / Pro: 7.5 g/dL / ALK PHOS: 58 U/L / ALT: 19 U/L / AST: 12 U/L / GGT: x           Coagulopathy PT/INR - ( 21 Jan 2020 00:55 )   PT: 20.4 sec;   INR: 1.74 ratio         PTT - ( 21 Jan 2020 00:55 )  PTT:32.8 sec  Lipid Panel   A1c 01-21 [id number] 8.2    Cardiac enzymes CARDIAC MARKERS ( 20 Jan 2020 16:17 )  x     / x     / x     / x     / 5.2 ng/mL      U/A   CSF  Immunological  Other    STUDIES & IMAGING:  Studies (EKG, EEG, EMG, etc):     Radiology (XR, CT, MR, U/S, TTE/TRUMAN):  < from: CT Head No Cont (01.21.20 @ 05:30) >  FINDINGS:    There is a focal hypodense region in the right parieto-occipital region abutting the occipital horn. There is an additional region of hypodensity within the high right frontoparietal region. These have the appearance of subacute to chronic infarcts. There is no evidence of hemorrhage or mass effect.    There is hyperdensity noted throughout the dural venous sinuses which is likely related to prior contrast administration.    There is periventricular white matter hypodensity consistent with microvascular-type changes. There is no intraparenchymal hematoma, mass effect or midline shift. No abnormal extra-axial fluid collections are present.     The calvarium is intact. The visualized intraorbital compartments, paranasal sinuses and mastoid complexes appear free of acute disease. There has been bilateral cataract surgery.    IMPRESSION:  Focal areas of hypodensity involving the right parieto-occipital and high right frontal parietal regions which may represent an subacute to chronic infarct although can represent chronic infarcts. Follow-up MRI can be obtained for further evaluation if not clinically contraindicated.    < end of copied text >

## 2020-01-21 NOTE — PROGRESS NOTE ADULT - PROBLEM SELECTOR PLAN 7
Denies CP. Noted HS-trop elevation to 132, but now downtrending. Likely demand ischemic in setting of ESRD. CKMB was wnl. No ischemic changes on EKG. Old RBBB present.  - Low c/f ACS.  - c/w ASA and statin Denies CP. Noted HS-trop elevation to 132, but now downtrending. Likely demand ischemic in setting of ESRD. CKMB was wnl. No ischemic changes on EKG. Old RBBB present.  - Low c/f ACS.  - c/w ASA and statin  Cardio has seen pt

## 2020-01-21 NOTE — ED ADULT NURSE REASSESSMENT NOTE - NS ED NURSE REASSESS COMMENT FT1
Admitting MD Carpenter at pt bedside for assessment at this time.
Attempted to page MD Carpenter #[phone] twice regarding pt heart rate. Awaiting call back.
Report received from SULY Mancilla. Pt is breathing unlabored on RA. Pt medicated and rpt trop sent as per MD order. Educated pt on plan of care. Safety and comfort maintained.
Recvd pt at 0715 awake, alert and responsive to all stimuli.  no sob or respiratory distress noted.  vss.  safety precautions in place.  pt awaiting admitting bed.  will continue to monitor.

## 2020-01-21 NOTE — CONSULT NOTE ADULT - PROBLEM SELECTOR RECOMMENDATION 9
Maintenance HD schedule TTS.  Plan for HD today, and try to maximize UF, as BP tolerates.  K controlled.  Contsent for HD obtained from pt. Will reassess tomorrow whether pt would benefit with additional UF session.  Hg at goal, no need for ROSARIO at this time.  High phos noted, repeat level tomorrow, will consider adding renvela with meals.

## 2020-01-21 NOTE — PROGRESS NOTE ADULT - PROBLEM SELECTOR PLAN 3
TTE in 2019 showed normal LV function, mild diastolic dysfunction; pt with worsening SOB and pedal edema and orthopnea   will recheck TTE  no role for diuresis given anuria   fluid removal per HD  monitor daily weights   - Continue home BB. TTE in 2019 showed normal LV function, mild diastolic dysfunction; pt with worsening SOB and pedal edema and orthopnea   F/Up TTE  no role for diuresis given anuria   fluid removal per HD  monitor daily weights   - Continue home BB.

## 2020-01-21 NOTE — CONSULT NOTE ADULT - ATTENDING COMMENTS
Patient seen and examined.  Agree with above.   Admitted with cough/sob found to have parainfluenza as well as subacute-chronic infarcts on CTH  Neuro eval noted - follow up neuro to see if bridging would be safe to start for subtherapeutic INR  Fluid removal with HD  Check TTE  Further workup pending above    Kalie Lau MD
VASCULAR NEUROLOGY ATTENDING  The patient is seen and examined the history and imaging are reviewed. I agree with the resident note unless otherwise noted. No evidence of acute stroke. No contraindication to AC.
Dr Rudy Mendiola  Nephrology  Office [phone]  Ans Serv [phone]  [facility] [phone]

## 2020-01-21 NOTE — CONSULT NOTE ADULT - SUBJECTIVE AND OBJECTIVE BOX
HISTORY OF PRESENT ILLNESS: HPI:  68 y/o male well known to our office (Cardiologist - Dr. Méndez) with CAD s/p multiple stents and recent CABG in 9/2019 with course c/b Afib on coumadin, HTN, HLD, DM2, and ESRD on HD presents with lethargy, cough/SOB, LE edema found to be volume overloaded with +parainfluenza. Patient reports worsening cough/SOB over last couple weeks. Patient also with orthopnea and LE edema. Patient reports recent sick contacts with family having URI symptoms. Patient noted to have syncopal episode last week while patient was walking but denies and symptoms prior to episode. Patient denies chest pain, dizziness, palpitations, abd pain, n/v, back pain, fever/chills.    PAST MEDICAL & SURGICAL HISTORY:  ESRD (end stage renal disease) on dialysis: T-Th-Sat  Stented coronary artery: total 15 stents from [id number]  Hyperthyroidism  H/O: CVA: after cardiac stent placed 12/15/09 -no residual  Heart Attack: 2/1/07 with subsequent stent  Coronary Stent: [id number] 10 stents,  to Ramus 1/2015, cath 01/2016 ( see results in HPI)  Hypercholesterolemia  CAD (Coronary Artery Disease)  DM (Diabetes Mellitus): x 4 yrs without N/N/R  HTN (Hypertension)  Hemodialysis access, AV graft: 5/2015, L arm  S/P cataract extraction  Boil of Buttock: 2006 and 2008      MEDICATIONS:  MEDICATIONS  (STANDING):  albuterol/ipratropium for Nebulization 3 milliLiter(s) Nebulizer every 6 hours  aMIOdarone    Tablet 200 milliGRAM(s) Oral daily  aspirin enteric coated 81 milliGRAM(s) Oral daily  calcium acetate 667 milliGRAM(s) Oral three times a day with meals  dextrose 5%. 1000 milliLiter(s) (50 mL/Hr) IV Continuous <Continuous>  dextrose 50% Injectable 12.5 Gram(s) IV Push once  dextrose 50% Injectable 25 Gram(s) IV Push once  dextrose 50% Injectable 25 Gram(s) IV Push once  glycopyrrolate 9 MICROgram(s)/formoterol 4.8 MICROgram(s) Inhaler 2 Puff(s) Inhalation two times a day  insulin glargine Injectable (LANTUS) 14 Unit(s) SubCutaneous at bedtime  insulin lispro (HumaLOG) corrective regimen sliding scale   SubCutaneous three times a day before meals  insulin lispro (HumaLOG) corrective regimen sliding scale   SubCutaneous at bedtime  insulin lispro Injectable (HumaLOG) 5 Unit(s) SubCutaneous three times a day before meals  ketotifen 0.025% Ophthalmic Solution 1 Drop(s) Both EYES daily  methimazole 5 milliGRAM(s) Oral daily  metoprolol succinate  milliGRAM(s) Oral daily  pantoprazole    Tablet 40 milliGRAM(s) Oral before breakfast  simvastatin 40 milliGRAM(s) Oral at bedtime  tiotropium 18 MICROgram(s) Capsule 1 Capsule(s) Inhalation daily      Allergies    lisinopril (Other)    Intolerances        FAMILY HISTORY:  Family history of coronary artery disease  Family history of diabetes mellitus (Sibling)    Non-contributary for premature coronary disease or sudden cardiac death    SOCIAL HISTORY:    [ x] Non-smoker  [ ] Smoker  [ ] Alcohol    FLU VACCINE THIS YEAR STARTS IN AUGUST:  [ x] Yes    [ ] No    IF OVER 65 HAVE YOU EVER HAD A PNA VACCINE:  [ x] Yes    [ ] No       [ ] N/A      REVIEW OF SYSTEMS:  [ ]chest pain  [ x ]shortness of breath  [  ]palpitations  [ x ]syncope  [ ]near syncope [ ]upper extremity weakness   [ ] lower extremity weakness  [  ]diplopia  [  ]altered mental status   [  ]fevers  [ ]chills [ ]nausea  [ ]vomitting  [  ]dysphagia    [ ]abdominal pain  [ ]melena  [ ]BRBPR    [  ]epistaxis  [  ]rash    [x ]lower extremity edema    +cough      [x ] All others negative	  [ ] Unable to obtain      LABS:	 	    CARDIAC MARKERS:  CARDIAC MARKERS ( 20 Jan 2020 16:17 )  x     / x     / x     / x     / 5.2 ng/mL                              13.2   8.77  )-----------( 148      ( 21 Jan 2020 07:28 )             44.2     Hb Trend: 13.2<--, 14.3<--    01-21    131<L>  |  90<L>  |  69<H>  ----------------------------<  176<H>  4.8   |  22  |  9.07<H>    Ca    9.1      21 Jan 2020 06:09  Phos  6.9     01-21  Mg     2.2     01-21    TPro  7.5  /  Alb  4.1  /  TBili  0.5  /  DBili  x   /  AST  12  /  ALT  19  /  AlkPhos  58  01-20    Creatinine Trend: 9.07<--, 8.36<--    Coags:  PT/INR - ( 21 Jan 2020 00:55 )   PT: 20.4 sec;   INR: 1.74 ratio         PTT - ( 21 Jan 2020 00:55 )  PTT:32.8 sec    proBNP: Serum Pro-Brain Natriuretic Peptide: 07718 pg/mL (01-20 @ 16:17)    Lipid Profile:   HgA1c: Hemoglobin A1C, Whole Blood: 8.2 % (01-21 @ 07:28)    TSH:         PHYSICAL EXAM:  T(C): 36.7 (01-21-20 @ 14:09), Max: 37.1 (01-21-20 @ 13:09)  HR: 78 (01-21-20 @ 14:09) (72 - 110)  BP: 115/58 (01-21-20 @ 14:09) (115/58 - 138/82)  RR: 17 (01-21-20 @ 14:09) (16 - 20)  SpO2: 98% (01-21-20 @ 14:09) (89% - 99%)  Wt(kg): --   BMI (kg/m2): 29 (01-20-20 @ 13:20)  I&O's Summary      Gen: Appears well in NAD  HEENT:  (-)icterus (-)pallor  CV: N S1 S2 1/6 DEJAH (+)2 Pulses B/l  Resp:  Diminished BS at bases B/L, normal effort  GI: (+) BS Soft, NT, ND  Lymph:  (+) B/L LE Edema, (-)obvious lymphadenopathy  Skin: Warm to touch, Normal turgor  Psych: Appropriate mood and affect      TELEMETRY: 	      ECG: Aflutter, RBBB (old)  	    RADIOLOGY:        < from: CT Angio Chest w/ IV Cont (01.20.20 @ 19:09) >  IMPRESSION:     1. Evaluation of the subsegmental pulmonary arteries in the lung bases are limited secondary to motion artifact. No pulmonary embolism in the remaining visualized pulmonary arteries.   2. Partially loculated small to moderate left pleural effusion with compressive atelectasis of part of the left lower lobe.  3. Diastasis of the median sternotomy measuring up to 1.4 cm with intact sternotomy wires which could be postsurgical in nature versus sternal dehiscence. Correlation with operative report or prior CT chest examination would be useful to determine stability.    < end of copied text >    < from: Transthoracic Echocardiogram (09.26.19 @ 10:45) >  Conclusions:  1. Mitral annular calcification. Mild mitral regurgitation.    2. Aortic valve not well visualized; appears calcified. No  aortic valve regurgitation seen.  3. Overall preserved left ventricular systolic function.  Septal motion consistent with cardiac surgery. The mid  inferolateral wall, the basal  inferolateral wall, the  basal anterolateral wall, and the mid anterolateral wall  are hypokinetic.  4. Mild diastolic dysfunction (Stage I).  5. The right ventricle is not well visualized; grossly  normal right ventricular size with decreased right  ventricular systolic function.  6. Normal pericardium with no pericardial effusion.  *** Compared with echocardiogram of 9/23/2019, the left  ventricular systolic function appears improved.  As per  Dr. Melgar no Definity needed.    < end of copied text >      ASSESSMENT/PLAN: 68 y/o male well known to our office (Cardiologist - Dr. Méndez) with CAD s/p multiple stents and recent CABG in 9/2019 with course c/b Afib on coumadin, HTN, HLD, DM2, and ESRD on HD presents with lethargy, cough/SOB, LE edema and reported syncopal episode 1 week ago found to be volume overloaded with +parainfluenza.    - Elevated troponin now downtrended likely demand ischemia in setting of fluid overload and ESRD, negative CKMB, no chest pain or acute ischemic EKG changes - do not suspect ACS at this time   - Monitor on telemetry  - Continue medical management of CAD - ASA/Statin/BB  - Fluid removal with HD per renal  - Supportive care for parainfluenza per medicine  - Continue AC with coumadin and recommend bridge with heparin gtt if INR remains subtherapeutic if okay with neuro in setting of CVA  - Neuro f/u - pending MRI  - Previous TTE noted above with normal LV function, mild diastolic dysfunction  - Repeat TTE pending    Wilfred Robin PA-C  [facility]  Pager: [phone] HISTORY OF PRESENT ILLNESS: HPI:  70 y/o male well known to our office (Cardiologist - Dr. Méndez) with CAD s/p multiple stents and recent CABG in 9/2019 with course c/b Afib on coumadin, HTN, HLD, DM2, and ESRD on HD presents with lethargy, cough/SOB, LE edema found to be volume overloaded with +parainfluenza. Patient reports worsening cough/SOB over last couple weeks. Patient also with orthopnea and LE edema. Patient reports recent sick contacts with family having URI symptoms. Patient noted to have syncopal episode last week while patient was walking but denies and symptoms prior to episode. Patient denies chest pain, dizziness, palpitations, abd pain, n/v, back pain, fever/chills.    PAST MEDICAL & SURGICAL HISTORY:  ESRD (end stage renal disease) on dialysis: T-Th-Sat  Stented coronary artery: total 15 stents from [id number]  Hyperthyroidism  H/O: CVA: after cardiac stent placed 12/15/09 -no residual  Heart Attack: 2/1/07 with subsequent stent  Coronary Stent: [id number] 10 stents,  to Ramus 1/2015, cath 01/2016 ( see results in HPI)  Hypercholesterolemia  CAD (Coronary Artery Disease)  DM (Diabetes Mellitus): x 4 yrs without N/N/R  HTN (Hypertension)  Hemodialysis access, AV graft: 5/2015, L arm  S/P cataract extraction  Boil of Buttock: 2006 and 2008      MEDICATIONS:  MEDICATIONS  (STANDING):  albuterol/ipratropium for Nebulization 3 milliLiter(s) Nebulizer every 6 hours  aMIOdarone    Tablet 200 milliGRAM(s) Oral daily  aspirin enteric coated 81 milliGRAM(s) Oral daily  calcium acetate 667 milliGRAM(s) Oral three times a day with meals  dextrose 5%. 1000 milliLiter(s) (50 mL/Hr) IV Continuous <Continuous>  dextrose 50% Injectable 12.5 Gram(s) IV Push once  dextrose 50% Injectable 25 Gram(s) IV Push once  dextrose 50% Injectable 25 Gram(s) IV Push once  glycopyrrolate 9 MICROgram(s)/formoterol 4.8 MICROgram(s) Inhaler 2 Puff(s) Inhalation two times a day  insulin glargine Injectable (LANTUS) 14 Unit(s) SubCutaneous at bedtime  insulin lispro (HumaLOG) corrective regimen sliding scale   SubCutaneous three times a day before meals  insulin lispro (HumaLOG) corrective regimen sliding scale   SubCutaneous at bedtime  insulin lispro Injectable (HumaLOG) 5 Unit(s) SubCutaneous three times a day before meals  ketotifen 0.025% Ophthalmic Solution 1 Drop(s) Both EYES daily  methimazole 5 milliGRAM(s) Oral daily  metoprolol succinate  milliGRAM(s) Oral daily  pantoprazole    Tablet 40 milliGRAM(s) Oral before breakfast  simvastatin 40 milliGRAM(s) Oral at bedtime  tiotropium 18 MICROgram(s) Capsule 1 Capsule(s) Inhalation daily      Allergies    lisinopril (Other)    Intolerances        FAMILY HISTORY:  Family history of coronary artery disease  Family history of diabetes mellitus (Sibling)    Non-contributary for premature coronary disease or sudden cardiac death    SOCIAL HISTORY:    [ x] Non-smoker  [ ] Smoker  [ ] Alcohol    FLU VACCINE THIS YEAR STARTS IN AUGUST:  [ x] Yes    [ ] No    IF OVER 65 HAVE YOU EVER HAD A PNA VACCINE:  [ x] Yes    [ ] No       [ ] N/A      REVIEW OF SYSTEMS:  [ ]chest pain  [ x ]shortness of breath  [  ]palpitations  [ x ]syncope  [ ]near syncope [ ]upper extremity weakness   [ ] lower extremity weakness  [  ]diplopia  [  ]altered mental status   [  ]fevers  [ ]chills [ ]nausea  [ ]vomitting  [  ]dysphagia    [ ]abdominal pain  [ ]melena  [ ]BRBPR    [  ]epistaxis  [  ]rash    [x ]lower extremity edema    +cough      [x ] All others negative	  [ ] Unable to obtain      LABS:	 	    CARDIAC MARKERS:  CARDIAC MARKERS ( 20 Jan 2020 16:17 )  x     / x     / x     / x     / 5.2 ng/mL                              13.2   8.77  )-----------( 148      ( 21 Jan 2020 07:28 )             44.2     Hb Trend: 13.2<--, 14.3<--    01-21    131<L>  |  90<L>  |  69<H>  ----------------------------<  176<H>  4.8   |  22  |  9.07<H>    Ca    9.1      21 Jan 2020 06:09  Phos  6.9     01-21  Mg     2.2     01-21    TPro  7.5  /  Alb  4.1  /  TBili  0.5  /  DBili  x   /  AST  12  /  ALT  19  /  AlkPhos  58  01-20    Creatinine Trend: 9.07<--, 8.36<--    Coags:  PT/INR - ( 21 Jan 2020 00:55 )   PT: 20.4 sec;   INR: 1.74 ratio         PTT - ( 21 Jan 2020 00:55 )  PTT:32.8 sec    proBNP: Serum Pro-Brain Natriuretic Peptide: 31253 pg/mL (01-20 @ 16:17)    Lipid Profile:   HgA1c: Hemoglobin A1C, Whole Blood: 8.2 % (01-21 @ 07:28)    TSH:         PHYSICAL EXAM:  T(C): 36.7 (01-21-20 @ 14:09), Max: 37.1 (01-21-20 @ 13:09)  HR: 78 (01-21-20 @ 14:09) (72 - 110)  BP: 115/58 (01-21-20 @ 14:09) (115/58 - 138/82)  RR: 17 (01-21-20 @ 14:09) (16 - 20)  SpO2: 98% (01-21-20 @ 14:09) (89% - 99%)  Wt(kg): --   BMI (kg/m2): 29 (01-20-20 @ 13:20)  I&O's Summary      Gen: Appears well in NAD  HEENT:  (-)icterus (-)pallor  CV: N S1 S2 1/6 DEJAH (+)2 Pulses B/l  Resp:  Diminished BS at bases B/L, normal effort  GI: (+) BS Soft, NT, ND  Lymph:  (+) B/L LE Edema, (-)obvious lymphadenopathy  Skin: Warm to touch, Normal turgor  Psych: Appropriate mood and affect      TELEMETRY: 	      ECG: Aflutter, RBBB (old)  	    RADIOLOGY:        < from: CT Angio Chest w/ IV Cont (01.20.20 @ 19:09) >  IMPRESSION:     1. Evaluation of the subsegmental pulmonary arteries in the lung bases are limited secondary to motion artifact. No pulmonary embolism in the remaining visualized pulmonary arteries.   2. Partially loculated small to moderate left pleural effusion with compressive atelectasis of part of the left lower lobe.  3. Diastasis of the median sternotomy measuring up to 1.4 cm with intact sternotomy wires which could be postsurgical in nature versus sternal dehiscence. Correlation with operative report or prior CT chest examination would be useful to determine stability.    < end of copied text >    < from: Transthoracic Echocardiogram (09.26.19 @ 10:45) >  Conclusions:  1. Mitral annular calcification. Mild mitral regurgitation.    2. Aortic valve not well visualized; appears calcified. No  aortic valve regurgitation seen.  3. Overall preserved left ventricular systolic function.  Septal motion consistent with cardiac surgery. The mid  inferolateral wall, the basal  inferolateral wall, the  basal anterolateral wall, and the mid anterolateral wall  are hypokinetic.  4. Mild diastolic dysfunction (Stage I).  5. The right ventricle is not well visualized; grossly  normal right ventricular size with decreased right  ventricular systolic function.  6. Normal pericardium with no pericardial effusion.  *** Compared with echocardiogram of 9/23/2019, the left  ventricular systolic function appears improved.  As per  Dr. Melgar no Definity needed.    < end of copied text >      ASSESSMENT/PLAN: 70 y/o male well known to our office (Cardiologist - Dr. Méndez) with CAD s/p multiple stents and recent CABG in 9/2019 with course c/b Afib on coumadin, HTN, HLD, DM2, and ESRD on HD presents with lethargy, cough/SOB, LE edema and reported syncopal episode 1 week ago found to be volume overloaded with +parainfluenza.    - Elevated troponin now downtrended likely demand ischemia in setting of fluid overload and ESRD, negative CKMB, no chest pain or acute ischemic EKG changes - do not suspect ACS at this time   - Monitor on telemetry  - Continue medical management of CAD - ASA/Statin/BB  - Fluid removal with HD per renal  - Supportive care for parainfluenza per medicine  - Continue AC with coumadin and recommend bridge with heparin gtt if INR remains subtherapeutic if okay with neuro in setting of CVA (d/w covering medicine PA)  - Neuro f/u - pending MRI  - Previous TTE noted above with normal LV function, mild diastolic dysfunction  - Repeat TTE pending    Wilfred Robin PA-C  [facility]  Pager: [phone]

## 2020-01-21 NOTE — CONSULT NOTE ADULT - SUBJECTIVE AND OBJECTIVE BOX
[facility] Consult Note Nephrology - CONSULTATION NOTE - [phone] - Dr Mendiola / Dr Knight / Dr Macias / Dr Arroyo / Dr Mackey / Dr Bowen / Dr Esposito / Dr Calle    Patient is a 69y Male whom presented to the hospital with     PAST MEDICAL & SURGICAL HISTORY:  ESRD (end stage renal disease) on dialysis: T-Th-Sat  Stented coronary artery: total 15 stents from 3036-5971  Hyperthyroidism  H/O: CVA: after cardiac stent placed 12/15/09 -no residual  Heart Attack: 2/1/07 with subsequent stent  Coronary Stent: 1406-8216 10 stents,  to Ramus 1/2015, cath 01/2016 ( see results in HPI)  Hypercholesterolemia  CAD (Coronary Artery Disease)  DM (Diabetes Mellitus): x 4 yrs without N/N/R  HTN (Hypertension)  Hemodialysis access, AV graft: 5/2015, L arm  S/P cataract extraction  Boil of Buttock: 2006 and 2008    Allergies:  lisinopril (Other)    Home Medications Reviewed  Hospital Medications:   MEDICATIONS  (STANDING):  albuterol/ipratropium for Nebulization 3 milliLiter(s) Nebulizer every 6 hours  aMIOdarone    Tablet 200 milliGRAM(s) Oral daily  aspirin enteric coated 81 milliGRAM(s) Oral daily  calcium acetate 667 milliGRAM(s) Oral three times a day with meals  dextrose 5%. 1000 milliLiter(s) (50 mL/Hr) IV Continuous <Continuous>  dextrose 50% Injectable 12.5 Gram(s) IV Push once  dextrose 50% Injectable 25 Gram(s) IV Push once  dextrose 50% Injectable 25 Gram(s) IV Push once  glycopyrrolate 9 MICROgram(s)/formoterol 4.8 MICROgram(s) Inhaler 2 Puff(s) Inhalation two times a day  insulin glargine Injectable (LANTUS) 14 Unit(s) SubCutaneous at bedtime  insulin lispro (HumaLOG) corrective regimen sliding scale   SubCutaneous three times a day before meals  insulin lispro (HumaLOG) corrective regimen sliding scale   SubCutaneous at bedtime  insulin lispro Injectable (HumaLOG) 5 Unit(s) SubCutaneous three times a day before meals  ketotifen 0.025% Ophthalmic Solution 1 Drop(s) Both EYES daily  methimazole 5 milliGRAM(s) Oral daily  metoprolol succinate  milliGRAM(s) Oral daily  pantoprazole    Tablet 40 milliGRAM(s) Oral before breakfast  simvastatin 40 milliGRAM(s) Oral at bedtime  tiotropium 18 MICROgram(s) Capsule 1 Capsule(s) Inhalation daily    SOCIAL HISTORY:  Denies ETOh,Smoking,   FAMILY HISTORY:  Family history of coronary artery disease  Family history of diabetes mellitus (Sibling)    REVIEW OF SYSTEMS:  CONSTITUTIONAL: No weakness, fevers or chills  EYES/ENT: No visual changes;  No vertigo or throat pain   NECK: No pain or stiffness  RESPIRATORY: No cough, wheezing, hemoptysis; No shortness of breath  CARDIOVASCULAR: No chest pain or palpitations.  GASTROINTESTINAL: No abdominal or epigastric pain. No nausea, vomiting, or hematemesis; No diarrhea or constipation. No melena or hematochezia.  GENITOURINARY: No dysuria, frequency, foamy urine, urinary urgency, incontinence or hematuria  NEUROLOGICAL: No numbness or weakness  SKIN: No itching, burning, rashes, or lesions   VASCULAR: No bilateral lower extremity edema.   All other review of systems is negative unless indicated above.    VITALS:  T(F): 97.4 (01-21-20 @ 08:36), Max: 98.6 (01-21-20 @ 04:49)  HR: 99 (01-21-20 @ 08:36)  BP: 134/76 (01-21-20 @ 08:36)  RR: 20 (01-21-20 @ 08:36)  SpO2: 99% (01-21-20 @ 08:36)  Wt(kg): --    Height (cm): 167.64 (01-20 @ 13:20)  Weight (kg): 81.6 (01-20 @ 13:20)  BMI (kg/m2): 29 (01-20 @ 13:20)  BSA (m2): 1.91 (01-20 @ 13:20)  PHYSICAL EXAM:  Constitutional: NAD  HEENT: anicteric sclera, oropharynx clear, MMM  Neck: No JVD  Respiratory: CTAB, no wheezes, rales or rhonchi  Cardiovascular: S1, S2, RRR  Gastrointestinal: BS+, soft, NT/ND  Extremities: No cyanosis or clubbing. No peripheral edema  Neurological: A/O x 3, no focal deficits  Psychiatric: Normal mood, normal affect  : No CVA tenderness. No wagner.   Skin: No rashes  Vascular Access: LUE AV access +thrill and bruit.     LABS:  01-21    131<L>  |  90<L>  |  69<H>  ----------------------------<  176<H>  4.8   |  22  |  9.07<H>    Ca    9.1      21 Jan 2020 06:09  Phos  6.9     01-21  Mg     2.2     01-21    TPro  7.5  /  Alb  4.1  /  TBili  0.5  /  DBili      /  AST  12  /  ALT  19  /  AlkPhos  58  01-20    Creatinine Trend: 9.07 <--, 8.36 <--                        13.2   8.77  )-----------( 148      ( 21 Jan 2020 07:28 )             44.2     Urine Studies:      RADIOLOGY & ADDITIONAL STUDIES:  < from: CT Angio Chest w/ IV Cont (01.20.20 @ 19:09) >  IMPRESSION:     1. Evaluation of the subsegmental pulmonary arteries in the lung bases are limited secondary to motion artifact. No pulmonary embolism in the remaining visualized pulmonary arteries.   2. Partially loculated small to moderate left pleural effusion with compressive atelectasis of part of the left lower lobe.  3. Diastasis of the median sternotomy measuring up to 1.4 cm with intact sternotomy wires which could be postsurgical in nature versus sternal dehiscence. Correlation with operative report or prior CT chest examination would be useful to determine stability.    < end of copied text >    < from: Xray Chest 2 Views PA/Lat (01.20.20 @ 18:38) >  IMPRESSION:  Moderate left pleural effusion, underlying pneumonia and/or atelectasis cannot be ruled out.    < end of copied text > [facility] Consult Note Nephrology - CONSULTATION NOTE - [phone] - Dr Mendiola / Dr Knight / Dr Macias / Dr Arroyo / Dr Mackey / Dr Bowen / Dr Esposito / Dr Calle    Patient is a 69y Male w CAD s/p multiple stents, recently s/p CABG (9/2019 course c/b Afib and LT pleural effusion), HTN, DM2, Afib on coumadin and ESRD on HD (Tu, Thurs, Sat) presenting from home with cough and SOB. Pt states he has been having SOB and LE edema since his surgery in September and has not returned to his baseline. Started having a cough about 3-4 weeks ago that has been persistent, non productive. Cough and SOB have gotten worse in the past 7-10 days; cough is so bad it is keeping patient awake at night, cannot walk further than 10-15 feet. +orthopnea. Associated with worsening weakness and fatigue, and last week patient also had a syncopal episode, where pt was walking and lost consciousness, no head strike as pt was caught before going to the floor. Pt denies any prodromal dizziness, CP, palpitations, nausea/vomiting, diaphoresis. Pt denies recent fevers or chills, +rhinorrhea, no CP, no palpitation, no n/v/abdominal pain, no diarrhea, does not make urine. +sick contacts (niece and her whole family have been sick with URI symptoms in past 2 weeks). Saw PMD recently regarding symptoms, who recommended coming to ED. Patient is not on O2 at home. Last HD was on Sat; 3L were removed.   In ED, patient was tachycardic to  and hypoxic to 88% on RA, now satting well on 3L NC. Small to moderate L pleural effusion noted on CT chest.  Pt was last dialyzed on 1/18, tolerating full HD treatment.     PAST MEDICAL & SURGICAL HISTORY:  ESRD (end stage renal disease) on dialysis: T-Th-Sat  Stented coronary artery: total 15 stents from 0935-0792  Hyperthyroidism  H/O: CVA: after cardiac stent placed 12/15/09 -no residual  Heart Attack: 2/1/07 with subsequent stent  Coronary Stent: 0186-8681 10 stents,  to Ramus 1/2015, cath 01/2016 ( see results in HPI)  Hypercholesterolemia  CAD (Coronary Artery Disease)  DM (Diabetes Mellitus): x 4 yrs without N/N/R  HTN (Hypertension)  Hemodialysis access, AV graft: 5/2015, L arm  S/P cataract extraction  Boil of Buttock: 2006 and 2008    Allergies:  lisinopril (Other)    Home Medications Reviewed  Hospital Medications:   MEDICATIONS  (STANDING):  albuterol/ipratropium for Nebulization 3 milliLiter(s) Nebulizer every 6 hours  aMIOdarone    Tablet 200 milliGRAM(s) Oral daily  aspirin enteric coated 81 milliGRAM(s) Oral daily  calcium acetate 667 milliGRAM(s) Oral three times a day with meals  dextrose 5%. 1000 milliLiter(s) (50 mL/Hr) IV Continuous <Continuous>  dextrose 50% Injectable 12.5 Gram(s) IV Push once  dextrose 50% Injectable 25 Gram(s) IV Push once  dextrose 50% Injectable 25 Gram(s) IV Push once  glycopyrrolate 9 MICROgram(s)/formoterol 4.8 MICROgram(s) Inhaler 2 Puff(s) Inhalation two times a day  insulin glargine Injectable (LANTUS) 14 Unit(s) SubCutaneous at bedtime  insulin lispro (HumaLOG) corrective regimen sliding scale   SubCutaneous three times a day before meals  insulin lispro (HumaLOG) corrective regimen sliding scale   SubCutaneous at bedtime  insulin lispro Injectable (HumaLOG) 5 Unit(s) SubCutaneous three times a day before meals  ketotifen 0.025% Ophthalmic Solution 1 Drop(s) Both EYES daily  methimazole 5 milliGRAM(s) Oral daily  metoprolol succinate  milliGRAM(s) Oral daily  pantoprazole    Tablet 40 milliGRAM(s) Oral before breakfast  simvastatin 40 milliGRAM(s) Oral at bedtime  tiotropium 18 MICROgram(s) Capsule 1 Capsule(s) Inhalation daily    SOCIAL HISTORY:  Denies ETOh,Smoking,   FAMILY HISTORY:  Family history of coronary artery disease  Family history of diabetes mellitus (Sibling)    REVIEW OF SYSTEMS:  CONSTITUTIONAL: +weakness, No fevers or chills  EYES/ENT: No visual changes;  No vertigo or throat pain   NECK: No pain or stiffness  RESPIRATORY: +cough, wheezing, No hemoptysis; +shortness of breath  CARDIOVASCULAR: No chest pain or palpitations.  GASTROINTESTINAL: No abdominal or epigastric pain. No nausea, vomiting, or hematemesis; No diarrhea or constipation. No melena or hematochezia.  GENITOURINARY: No dysuria, frequency, foamy urine, urinary urgency, incontinence or hematuria  NEUROLOGICAL: No numbness or weakness  SKIN: No itching, burning, rashes, or lesions   VASCULAR: +bilateral lower extremity edema.   All other review of systems is negative unless indicated above.    VITALS:  T(F): 97.4 (01-21-20 @ 08:36), Max: 98.6 (01-21-20 @ 04:49)  HR: 99 (01-21-20 @ 08:36)  BP: 134/76 (01-21-20 @ 08:36)  RR: 20 (01-21-20 @ 08:36)  SpO2: 99% (01-21-20 @ 08:36)  Wt(kg): --    Height (cm): 167.64 (01-20 @ 13:20)  Weight (kg): 81.6 (01-20 @ 13:20)  BMI (kg/m2): 29 (01-20 @ 13:20)  BSA (m2): 1.91 (01-20 @ 13:20)  PHYSICAL EXAM:  Constitutional: NAD  HEENT: anicteric sclera, oropharynx clear, MMM  Neck: No JVD  Respiratory: CTAB, no wheezes, rales or rhonchi  Cardiovascular: S1, S2, RRR  Gastrointestinal: BS+, soft, NT/ND  Extremities: No cyanosis or clubbing. No peripheral edema  Neurological: A/O x 3, no focal deficits  Psychiatric: Normal mood, normal affect  : No CVA tenderness. No wagner.   Skin: No rashes  Vascular Access: LUE AV access +thrill and bruit.     LABS:  01-21    131<L>  |  90<L>  |  69<H>  ----------------------------<  176<H>  4.8   |  22  |  9.07<H>    Ca    9.1      21 Jan 2020 06:09  Phos  6.9     01-21  Mg     2.2     01-21    TPro  7.5  /  Alb  4.1  /  TBili  0.5  /  DBili      /  AST  12  /  ALT  19  /  AlkPhos  58  01-20    Creatinine Trend: 9.07 <--, 8.36 <--                        13.2   8.77  )-----------( 148      ( 21 Jan 2020 07:28 )             44.2     Urine Studies:      RADIOLOGY & ADDITIONAL STUDIES:  < from: CT Angio Chest w/ IV Cont (01.20.20 @ 19:09) >  IMPRESSION:     1. Evaluation of the subsegmental pulmonary arteries in the lung bases are limited secondary to motion artifact. No pulmonary embolism in the remaining visualized pulmonary arteries.   2. Partially loculated small to moderate left pleural effusion with compressive atelectasis of part of the left lower lobe.  3. Diastasis of the median sternotomy measuring up to 1.4 cm with intact sternotomy wires which could be postsurgical in nature versus sternal dehiscence. Correlation with operative report or prior CT chest examination would be useful to determine stability.    < end of copied text >    < from: Xray Chest 2 Views PA/Lat (01.20.20 @ 18:38) >  IMPRESSION:  Moderate left pleural effusion, underlying pneumonia and/or atelectasis cannot be ruled out.    < end of copied text >

## 2020-01-21 NOTE — PROGRESS NOTE ADULT - PROBLEM SELECTOR PLAN 9
check A1c   Patient on lantus 14 and humalog 5 tid - will continue while admitted  low insulin sliding scale   monitor FS ac and hs A1c = 8   Patient on lantus 14 and humalog 5 tid - will continue while admitted  low insulin sliding scale   monitor FS ac and hs

## 2020-01-22 DIAGNOSIS — R04.0 EPISTAXIS: ICD-10-CM

## 2020-01-22 LAB
ANION GAP SERPL CALC-SCNC: 13 MMOL/L — SIGNIFICANT CHANGE UP (ref 5–17)
APTT BLD: 30.4 SEC — SIGNIFICANT CHANGE UP (ref 27.5–36.3)
BUN SERPL-MCNC: 44 MG/DL — HIGH (ref 7–23)
CALCIUM SERPL-MCNC: 9.1 MG/DL — SIGNIFICANT CHANGE UP (ref 8.4–10.5)
CHLORIDE SERPL-SCNC: 89 MMOL/L — LOW (ref 96–108)
CO2 SERPL-SCNC: 27 MMOL/L — SIGNIFICANT CHANGE UP (ref 22–31)
CREAT SERPL-MCNC: 6.74 MG/DL — HIGH (ref 0.5–1.3)
GLUCOSE BLDC GLUCOMTR-MCNC: 129 MG/DL — HIGH (ref 70–99)
GLUCOSE BLDC GLUCOMTR-MCNC: 135 MG/DL — HIGH (ref 70–99)
GLUCOSE BLDC GLUCOMTR-MCNC: 137 MG/DL — HIGH (ref 70–99)
GLUCOSE BLDC GLUCOMTR-MCNC: 91 MG/DL — SIGNIFICANT CHANGE UP (ref 70–99)
GLUCOSE SERPL-MCNC: 112 MG/DL — HIGH (ref 70–99)
HCT VFR BLD CALC: 42.7 % — SIGNIFICANT CHANGE UP (ref 39–50)
HGB BLD-MCNC: 12.8 G/DL — LOW (ref 13–17)
INR BLD: 1.58 RATIO — HIGH (ref 0.88–1.16)
MCHC RBC-ENTMCNC: 27.3 PG — SIGNIFICANT CHANGE UP (ref 27–34)
MCHC RBC-ENTMCNC: 30 GM/DL — LOW (ref 32–36)
MCV RBC AUTO: 91 FL — SIGNIFICANT CHANGE UP (ref 80–100)
NRBC # BLD: 0 /100 WBCS — SIGNIFICANT CHANGE UP (ref 0–0)
PHOSPHATE SERPL-MCNC: 5.3 MG/DL — HIGH (ref 2.5–4.5)
PLATELET # BLD AUTO: 117 K/UL — LOW (ref 150–400)
POTASSIUM SERPL-MCNC: 3.9 MMOL/L — SIGNIFICANT CHANGE UP (ref 3.5–5.3)
POTASSIUM SERPL-SCNC: 3.9 MMOL/L — SIGNIFICANT CHANGE UP (ref 3.5–5.3)
PROTHROM AB SERPL-ACNC: 18.3 SEC — HIGH (ref 10–12.9)
RBC # BLD: 4.69 M/UL — SIGNIFICANT CHANGE UP (ref 4.2–5.8)
RBC # FLD: 16.1 % — HIGH (ref 10.3–14.5)
SODIUM SERPL-SCNC: 129 MMOL/L — LOW (ref 135–145)
WBC # BLD: 9.21 K/UL — SIGNIFICANT CHANGE UP (ref 3.8–10.5)
WBC # FLD AUTO: 9.21 K/UL — SIGNIFICANT CHANGE UP (ref 3.8–10.5)

## 2020-01-22 PROCEDURE — 99233 SBSQ HOSP IP/OBS HIGH 50: CPT

## 2020-01-22 PROCEDURE — 99221 1ST HOSP IP/OBS SF/LOW 40: CPT

## 2020-01-22 PROCEDURE — 93306 TTE W/DOPPLER COMPLETE: CPT | Mod: 26

## 2020-01-22 PROCEDURE — 99223 1ST HOSP IP/OBS HIGH 75: CPT

## 2020-01-22 RX ORDER — FLUTICASONE PROPIONATE 50 MCG
1 SPRAY, SUSPENSION NASAL
Refills: 0 | Status: DISCONTINUED | OUTPATIENT
Start: 2020-01-22 | End: 2020-01-28

## 2020-01-22 RX ORDER — WARFARIN SODIUM 2.5 MG/1
7 TABLET ORAL ONCE
Refills: 0 | Status: COMPLETED | OUTPATIENT
Start: 2020-01-22 | End: 2020-01-22

## 2020-01-22 RX ORDER — SODIUM CHLORIDE 0.65 %
1 AEROSOL, SPRAY (ML) NASAL
Refills: 0 | Status: DISCONTINUED | OUTPATIENT
Start: 2020-01-22 | End: 2020-01-28

## 2020-01-22 RX ADMIN — WARFARIN SODIUM 7 MILLIGRAM(S): 2.5 TABLET ORAL at 22:08

## 2020-01-22 RX ADMIN — Medication 81 MILLIGRAM(S): at 13:42

## 2020-01-22 RX ADMIN — KETOTIFEN FUMARATE 1 DROP(S): 0.34 SOLUTION OPHTHALMIC at 13:43

## 2020-01-22 RX ADMIN — INSULIN GLARGINE 14 UNIT(S): 100 INJECTION, SOLUTION SUBCUTANEOUS at 22:08

## 2020-01-22 RX ADMIN — Medication 3 MILLILITER(S): at 13:42

## 2020-01-22 RX ADMIN — Medication 667 MILLIGRAM(S): at 09:55

## 2020-01-22 RX ADMIN — Medication 3 MILLILITER(S): at 05:19

## 2020-01-22 RX ADMIN — Medication 3 MILLILITER(S): at 22:06

## 2020-01-22 RX ADMIN — Medication 1 SPRAY(S): at 22:07

## 2020-01-22 RX ADMIN — SIMVASTATIN 40 MILLIGRAM(S): 20 TABLET, FILM COATED ORAL at 22:08

## 2020-01-22 RX ADMIN — AMIODARONE HYDROCHLORIDE 200 MILLIGRAM(S): 400 TABLET ORAL at 05:19

## 2020-01-22 RX ADMIN — TIOTROPIUM BROMIDE 1 CAPSULE(S): 18 CAPSULE ORAL; RESPIRATORY (INHALATION) at 14:35

## 2020-01-22 RX ADMIN — PANTOPRAZOLE SODIUM 40 MILLIGRAM(S): 20 TABLET, DELAYED RELEASE ORAL at 05:19

## 2020-01-22 RX ADMIN — Medication 5 UNIT(S): at 13:43

## 2020-01-22 RX ADMIN — Medication 5 UNIT(S): at 09:55

## 2020-01-22 RX ADMIN — Medication 0: at 22:08

## 2020-01-22 RX ADMIN — GLYCOPYRROLATE AND FORMOTEROL FUMARATE 2 PUFF(S): 9; 4.8 AEROSOL, METERED RESPIRATORY (INHALATION) at 05:19

## 2020-01-22 RX ADMIN — Medication 100 MILLIGRAM(S): at 05:19

## 2020-01-22 RX ADMIN — Medication 667 MILLIGRAM(S): at 13:42

## 2020-01-22 NOTE — PROGRESS NOTE ADULT - PROBLEM SELECTOR PLAN 7
Noted HS-trop elevation to 132, but now downtrending. Likely demand ischemic in setting of ESRD. CKMB was wnl. No ischemic changes on EKG. Old RBBB present.  - Low c/f ACS.  - c/w ASA and statin  Cardio has seen pt.  New RV dysfunction , case is discussed with the cardiologist   Pt had CTA with no PE / Limited studies

## 2020-01-22 NOTE — CONSULT NOTE ADULT - ASSESSMENT
69M with significant cardiac history on coumadin presenting with minimal epistaxis earlier today. Dried blood noted in left nare. No active bleeding at this time.

## 2020-01-22 NOTE — PROGRESS NOTE ADULT - SUBJECTIVE AND OBJECTIVE BOX
Patient is a 69y old  Male who presents with a chief complaint of lethargy, coughing, SOB (21 Jan 2020 14:26)      SUBJECTIVE / OVERNIGHT EVENTS:  no fever overnight       ADDITIONAL REVIEW OF SYSTEMS: Negative except for above    MEDICATIONS  (STANDING):  albuterol/ipratropium for Nebulization 3 milliLiter(s) Nebulizer every 6 hours  aMIOdarone    Tablet 200 milliGRAM(s) Oral daily  aspirin enteric coated 81 milliGRAM(s) Oral daily  calcium acetate 667 milliGRAM(s) Oral three times a day with meals  dextrose 5%. 1000 milliLiter(s) (50 mL/Hr) IV Continuous <Continuous>  dextrose 50% Injectable 12.5 Gram(s) IV Push once  dextrose 50% Injectable 25 Gram(s) IV Push once  dextrose 50% Injectable 25 Gram(s) IV Push once  glycopyrrolate 9 MICROgram(s)/formoterol 4.8 MICROgram(s) Inhaler 2 Puff(s) Inhalation two times a day  insulin glargine Injectable (LANTUS) 14 Unit(s) SubCutaneous at bedtime  insulin lispro (HumaLOG) corrective regimen sliding scale   SubCutaneous three times a day before meals  insulin lispro (HumaLOG) corrective regimen sliding scale   SubCutaneous at bedtime  insulin lispro Injectable (HumaLOG) 5 Unit(s) SubCutaneous three times a day before meals  ketotifen 0.025% Ophthalmic Solution 1 Drop(s) Both EYES daily  methimazole 5 milliGRAM(s) Oral daily  metoprolol succinate  milliGRAM(s) Oral daily  pantoprazole    Tablet 40 milliGRAM(s) Oral before breakfast  simvastatin 40 milliGRAM(s) Oral at bedtime  tiotropium 18 MICROgram(s) Capsule 1 Capsule(s) Inhalation daily  warfarin 7 milliGRAM(s) Oral once    MEDICATIONS  (PRN):  acetaminophen   Tablet .. 650 milliGRAM(s) Oral every 6 hours PRN Temp greater or equal to 38C (100.4F), Mild Pain (1 - 3)  dextrose 40% Gel 15 Gram(s) Oral once PRN Blood Glucose LESS THAN 70 milliGRAM(s)/deciliter  glucagon  Injectable 1 milliGRAM(s) IntraMuscular once PRN Glucose LESS THAN 70 milligrams/deciliter  guaiFENesin   Syrup  (Sugar-Free) 100 milliGRAM(s) Oral every 6 hours PRN Cough      CAPILLARY BLOOD GLUCOSE      POCT Blood Glucose.: 129 mg/dL (22 Jan 2020 13:40)  POCT Blood Glucose.: 91 mg/dL (22 Jan 2020 09:41)  POCT Blood Glucose.: 102 mg/dL (21 Jan 2020 22:43)  POCT Blood Glucose.: 125 mg/dL (21 Jan 2020 19:30)    I&O's Summary    21 Jan 2020 07:01  -  22 Jan 2020 07:00  --------------------------------------------------------  IN: 900 mL / OUT: 3400 mL / NET: -2500 mL        PHYSICAL EXAM:  Vital Signs Last 24 Hrs  T(C): 36.8 (22 Jan 2020 10:19), Max: 37.1 (22 Jan 2020 05:04)  T(F): 98.3 (22 Jan 2020 10:19), Max: 98.7 (22 Jan 2020 05:04)  HR: 80 (22 Jan 2020 10:19) (79 - 84)  BP: 114/69 (22 Jan 2020 10:19) (113/70 - 138/81)  BP(mean): --  RR: 18 (22 Jan 2020 10:19) (17 - 18)  SpO2: 100% (22 Jan 2020 10:19) (95% - 100%)    PHYSICAL EXAM:  GENERAL: NAD, well-developed  HEAD:  Atraumatic, Normocephalic  EYES:  conjunctiva and sclera clear  NECK: Supple, No JVD  CHEST/LUNG: Clear to auscultation bilaterally; No wheeze  HEART: Regular rate and rhythm; No murmurs, rubs, or gallops  ABDOMEN: Soft, Nontender, Nondistended; Bowel sounds present  EXTREMITIES:  2+ Peripheral Pulses, No clubbing, cyanosis, or edema  PSYCH: AAOx3  NEUROLOGY: non-focal  SKIN: No rashes or lesions      LABS:                        12.8   9.21  )-----------( 117      ( 22 Jan 2020 06:39 )             42.7     01-22    129<L>  |  89<L>  |  44<H>  ----------------------------<  112<H>  3.9   |  27  |  6.74<H>    Ca    9.1      22 Jan 2020 06:34  Phos  5.3     01-22  Mg     2.2     01-21    TPro  7.5  /  Alb  4.1  /  TBili  0.5  /  DBili  x   /  AST  12  /  ALT  19  /  AlkPhos  58  01-20    PT/INR - ( 22 Jan 2020 06:37 )   PT: 18.3 sec;   INR: 1.58 ratio         PTT - ( 22 Jan 2020 06:37 )  PTT:30.4 sec  CARDIAC MARKERS ( 20 Jan 2020 16:17 )  x     / x     / x     / x     / 5.2 ng/mL            RADIOLOGY & ADDITIONAL TESTS:    Imaging Personally Reviewed:    Electrocardiogram Personally Reviewed:    COORDINATION OF CARE:  Care Discussed with Consultants/Other Providers [Y/N]:  Prior or Outpatient Records Reviewed [Y/N]: Patient is a 69y old  Male who presents with a chief complaint of lethargy, coughing, SOB (21 Jan 2020 14:26)      SUBJECTIVE / OVERNIGHT EVENTS:  no fever overnight .  Pt states to feel better , patient's wife states that patient had an episode of blood from the nose today and last week.  Wife states that coumadin was held for 3 days and       ADDITIONAL REVIEW OF SYSTEMS: Negative except for above    MEDICATIONS  (STANDING):  albuterol/ipratropium for Nebulization 3 milliLiter(s) Nebulizer every 6 hours  aMIOdarone    Tablet 200 milliGRAM(s) Oral daily  aspirin enteric coated 81 milliGRAM(s) Oral daily  calcium acetate 667 milliGRAM(s) Oral three times a day with meals  dextrose 5%. 1000 milliLiter(s) (50 mL/Hr) IV Continuous <Continuous>  dextrose 50% Injectable 12.5 Gram(s) IV Push once  dextrose 50% Injectable 25 Gram(s) IV Push once  dextrose 50% Injectable 25 Gram(s) IV Push once  glycopyrrolate 9 MICROgram(s)/formoterol 4.8 MICROgram(s) Inhaler 2 Puff(s) Inhalation two times a day  insulin glargine Injectable (LANTUS) 14 Unit(s) SubCutaneous at bedtime  insulin lispro (HumaLOG) corrective regimen sliding scale   SubCutaneous three times a day before meals  insulin lispro (HumaLOG) corrective regimen sliding scale   SubCutaneous at bedtime  insulin lispro Injectable (HumaLOG) 5 Unit(s) SubCutaneous three times a day before meals  ketotifen 0.025% Ophthalmic Solution 1 Drop(s) Both EYES daily  methimazole 5 milliGRAM(s) Oral daily  metoprolol succinate  milliGRAM(s) Oral daily  pantoprazole    Tablet 40 milliGRAM(s) Oral before breakfast  simvastatin 40 milliGRAM(s) Oral at bedtime  tiotropium 18 MICROgram(s) Capsule 1 Capsule(s) Inhalation daily  warfarin 7 milliGRAM(s) Oral once    MEDICATIONS  (PRN):  acetaminophen   Tablet .. 650 milliGRAM(s) Oral every 6 hours PRN Temp greater or equal to 38C (100.4F), Mild Pain (1 - 3)  dextrose 40% Gel 15 Gram(s) Oral once PRN Blood Glucose LESS THAN 70 milliGRAM(s)/deciliter  glucagon  Injectable 1 milliGRAM(s) IntraMuscular once PRN Glucose LESS THAN 70 milligrams/deciliter  guaiFENesin   Syrup  (Sugar-Free) 100 milliGRAM(s) Oral every 6 hours PRN Cough      CAPILLARY BLOOD GLUCOSE      POCT Blood Glucose.: 129 mg/dL (22 Jan 2020 13:40)  POCT Blood Glucose.: 91 mg/dL (22 Jan 2020 09:41)  POCT Blood Glucose.: 102 mg/dL (21 Jan 2020 22:43)  POCT Blood Glucose.: 125 mg/dL (21 Jan 2020 19:30)    I&O's Summary    21 Jan 2020 07:01  -  22 Jan 2020 07:00  --------------------------------------------------------  IN: 900 mL / OUT: 3400 mL / NET: -2500 mL        PHYSICAL EXAM:  Vital Signs Last 24 Hrs  T(C): 36.8 (22 Jan 2020 10:19), Max: 37.1 (22 Jan 2020 05:04)  T(F): 98.3 (22 Jan 2020 10:19), Max: 98.7 (22 Jan 2020 05:04)  HR: 80 (22 Jan 2020 10:19) (79 - 84)  BP: 114/69 (22 Jan 2020 10:19) (113/70 - 138/81)  BP(mean): --  RR: 18 (22 Jan 2020 10:19) (17 - 18)  SpO2: 100% (22 Jan 2020 10:19) (95% - 100%)    PHYSICAL EXAM:  GENERAL: NAD, well-developed  HEAD:  Atraumatic, Normocephalic  EYES:  conjunctiva and sclera clear  NECK: Supple, No JVD  CHEST/LUNG: Clear to auscultation bilaterally; No wheeze  HEART: Regular rate and rhythm; No murmurs, rubs, or gallops  ABDOMEN: Soft, Nontender, Nondistended; Bowel sounds present  EXTREMITIES:  2+ Peripheral Pulses, No clubbing, cyanosis, or edema  PSYCH: AAOx3  NEUROLOGY: non-focal  SKIN: No rashes or lesions      LABS:                        12.8   9.21  )-----------( 117      ( 22 Jan 2020 06:39 )             42.7     01-22    129<L>  |  89<L>  |  44<H>  ----------------------------<  112<H>  3.9   |  27  |  6.74<H>    Ca    9.1      22 Jan 2020 06:34  Phos  5.3     01-22  Mg     2.2     01-21    TPro  7.5  /  Alb  4.1  /  TBili  0.5  /  DBili  x   /  AST  12  /  ALT  19  /  AlkPhos  58  01-20    PT/INR - ( 22 Jan 2020 06:37 )   PT: 18.3 sec;   INR: 1.58 ratio         PTT - ( 22 Jan 2020 06:37 )  PTT:30.4 sec  CARDIAC MARKERS ( 20 Jan 2020 16:17 )  x     / x     / x     / x     / 5.2 ng/mL            RADIOLOGY & ADDITIONAL TESTS:    Imaging Personally Reviewed:    Electrocardiogram Personally Reviewed:    COORDINATION OF CARE:  Care Discussed with Consultants/Other Providers [Y/N]:  Prior or Outpatient Records Reviewed [Y/N]: Patient is a 69y old  Male who presents with a chief complaint of lethargy, coughing, SOB (21 Jan 2020 14:26)      SUBJECTIVE / OVERNIGHT EVENTS:  no fever overnight .  Pt states to feel better , patient's wife states that patient had an episode of blood from the nose today and last week.  Wife states that coumadin was held for 3 days last week when he was c/o epistasis.  NO free flowing blood from the nose, no blood in stool.        ADDITIONAL REVIEW OF SYSTEMS: Negative except for above    MEDICATIONS  (STANDING):  albuterol/ipratropium for Nebulization 3 milliLiter(s) Nebulizer every 6 hours  aMIOdarone    Tablet 200 milliGRAM(s) Oral daily  aspirin enteric coated 81 milliGRAM(s) Oral daily  calcium acetate 667 milliGRAM(s) Oral three times a day with meals  dextrose 5%. 1000 milliLiter(s) (50 mL/Hr) IV Continuous <Continuous>  dextrose 50% Injectable 12.5 Gram(s) IV Push once  dextrose 50% Injectable 25 Gram(s) IV Push once  dextrose 50% Injectable 25 Gram(s) IV Push once  glycopyrrolate 9 MICROgram(s)/formoterol 4.8 MICROgram(s) Inhaler 2 Puff(s) Inhalation two times a day  insulin glargine Injectable (LANTUS) 14 Unit(s) SubCutaneous at bedtime  insulin lispro (HumaLOG) corrective regimen sliding scale   SubCutaneous three times a day before meals  insulin lispro (HumaLOG) corrective regimen sliding scale   SubCutaneous at bedtime  insulin lispro Injectable (HumaLOG) 5 Unit(s) SubCutaneous three times a day before meals  ketotifen 0.025% Ophthalmic Solution 1 Drop(s) Both EYES daily  methimazole 5 milliGRAM(s) Oral daily  metoprolol succinate  milliGRAM(s) Oral daily  pantoprazole    Tablet 40 milliGRAM(s) Oral before breakfast  simvastatin 40 milliGRAM(s) Oral at bedtime  tiotropium 18 MICROgram(s) Capsule 1 Capsule(s) Inhalation daily  warfarin 7 milliGRAM(s) Oral once    MEDICATIONS  (PRN):  acetaminophen   Tablet .. 650 milliGRAM(s) Oral every 6 hours PRN Temp greater or equal to 38C (100.4F), Mild Pain (1 - 3)  dextrose 40% Gel 15 Gram(s) Oral once PRN Blood Glucose LESS THAN 70 milliGRAM(s)/deciliter  glucagon  Injectable 1 milliGRAM(s) IntraMuscular once PRN Glucose LESS THAN 70 milligrams/deciliter  guaiFENesin   Syrup  (Sugar-Free) 100 milliGRAM(s) Oral every 6 hours PRN Cough      CAPILLARY BLOOD GLUCOSE      POCT Blood Glucose.: 129 mg/dL (22 Jan 2020 13:40)  POCT Blood Glucose.: 91 mg/dL (22 Jan 2020 09:41)  POCT Blood Glucose.: 102 mg/dL (21 Jan 2020 22:43)  POCT Blood Glucose.: 125 mg/dL (21 Jan 2020 19:30)    I&O's Summary    21 Jan 2020 07:01  -  22 Jan 2020 07:00  --------------------------------------------------------  IN: 900 mL / OUT: 3400 mL / NET: -2500 mL        PHYSICAL EXAM:  Vital Signs Last 24 Hrs  T(C): 36.8 (22 Jan 2020 10:19), Max: 37.1 (22 Jan 2020 05:04)  T(F): 98.3 (22 Jan 2020 10:19), Max: 98.7 (22 Jan 2020 05:04)  HR: 80 (22 Jan 2020 10:19) (79 - 84)  BP: 114/69 (22 Jan 2020 10:19) (113/70 - 138/81)  BP(mean): --  RR: 18 (22 Jan 2020 10:19) (17 - 18)  SpO2: 100% (22 Jan 2020 10:19) (95% - 100%)    PHYSICAL EXAM:  GENERAL: NAD, well-developed  HEAD:  Atraumatic, Normocephalic  EYES:  conjunctiva and sclera clear  NECK: Supple, No JVD  CHEST/LUNG: Clear to auscultation bilaterally; No wheeze  HEART: Regular rate and rhythm; No murmurs, rubs, or gallops  ABDOMEN: Soft, Nontender, Nondistended; Bowel sounds present  EXTREMITIES:  2+ Peripheral Pulses, No clubbing, cyanosis, or edema  PSYCH: AAOx3        LABS:                        12.8   9.21  )-----------( 117      ( 22 Jan 2020 06:39 )             42.7     01-22    129<L>  |  89<L>  |  44<H>  ----------------------------<  112<H>  3.9   |  27  |  6.74<H>    Ca    9.1      22 Jan 2020 06:34  Phos  5.3     01-22  Mg     2.2     01-21    TPro  7.5  /  Alb  4.1  /  TBili  0.5  /  DBili  x   /  AST  12  /  ALT  19  /  AlkPhos  58  01-20    PT/INR - ( 22 Jan 2020 06:37 )   PT: 18.3 sec;   INR: 1.58 ratio         PTT - ( 22 Jan 2020 06:37 )  PTT:30.4 sec  CARDIAC MARKERS ( 20 Jan 2020 16:17 )  x     / x     / x     / x     / 5.2 ng/mL            RADIOLOGY & ADDITIONAL TESTS:    Imaging Personally Reviewed:    Electrocardiogram Personally Reviewed:    COORDINATION OF CARE:  Care Discussed with Consultants/Other Providers [Y/N]:  Prior or Outpatient Records Reviewed [Y/N]:

## 2020-01-22 NOTE — PROGRESS NOTE ADULT - PROBLEM SELECTOR PLAN 2
pt noted with moderate L pleural effusion, which is likely contributing to pts symptoms   on review of previous records, pt had post-surgical L pleural effusion requiring chest tube   Might need to repeat CXR or CT without contrast post EXTRA  HD today  to reassess for the pleural effusion   We have called the Pulm team who will see patient but states that there is no current intervention needed as the pleural effusion is not large enough for intervention .  consult thoracic surgery was contacted and they recommended to follow up with the pulm rec and to call the Cardiothoracic surgery

## 2020-01-22 NOTE — CONSULT NOTE ADULT - REASON FOR ADMISSION
lethargy, coughing, SOB

## 2020-01-22 NOTE — PROGRESS NOTE ADULT - SUBJECTIVE AND OBJECTIVE BOX
Subjective: No chest pain or sob; ROS otherwise negative.   	  MEDICATIONS:  MEDICATIONS  (STANDING):  albuterol/ipratropium for Nebulization 3 milliLiter(s) Nebulizer every 6 hours  aMIOdarone    Tablet 200 milliGRAM(s) Oral daily  aspirin enteric coated 81 milliGRAM(s) Oral daily  calcium acetate 667 milliGRAM(s) Oral three times a day with meals  dextrose 5%. 1000 milliLiter(s) (50 mL/Hr) IV Continuous <Continuous>  dextrose 50% Injectable 12.5 Gram(s) IV Push once  dextrose 50% Injectable 25 Gram(s) IV Push once  dextrose 50% Injectable 25 Gram(s) IV Push once  glycopyrrolate 9 MICROgram(s)/formoterol 4.8 MICROgram(s) Inhaler 2 Puff(s) Inhalation two times a day  insulin glargine Injectable (LANTUS) 14 Unit(s) SubCutaneous at bedtime  insulin lispro (HumaLOG) corrective regimen sliding scale   SubCutaneous three times a day before meals  insulin lispro (HumaLOG) corrective regimen sliding scale   SubCutaneous at bedtime  insulin lispro Injectable (HumaLOG) 5 Unit(s) SubCutaneous three times a day before meals  ketotifen 0.025% Ophthalmic Solution 1 Drop(s) Both EYES daily  methimazole 5 milliGRAM(s) Oral daily  metoprolol succinate  milliGRAM(s) Oral daily  pantoprazole    Tablet 40 milliGRAM(s) Oral before breakfast  simvastatin 40 milliGRAM(s) Oral at bedtime  tiotropium 18 MICROgram(s) Capsule 1 Capsule(s) Inhalation daily  warfarin 7 milliGRAM(s) Oral once      LABS:	 	    CARDIAC MARKERS:  CARDIAC MARKERS ( 20 Jan 2020 16:17 )  x     / x     / x     / x     / 5.2 ng/mL                                12.8   9.21  )-----------( 117      ( 22 Jan 2020 06:39 )             42.7     01-22    129<L>  |  89<L>  |  44<H>  ----------------------------<  112<H>  3.9   |  27  |  6.74<H>    Ca    9.1      22 Jan 2020 06:34  Phos  5.3     01-22  Mg     2.2     01-21    TPro  7.5  /  Alb  4.1  /  TBili  0.5  /  DBili  x   /  AST  12  /  ALT  19  /  AlkPhos  58  01-20    proBNP:   Lipid Profile:   HgA1c:   TSH:       PHYSICAL EXAM:  T(C): 36.8 (01-22-20 @ 14:34), Max: 37.1 (01-22-20 @ 05:04)  HR: 75 (01-22-20 @ 14:34) (75 - 84)  BP: 123/73 (01-22-20 @ 14:34) (113/70 - 138/81)  RR: 18 (01-22-20 @ 14:34) (17 - 18)  SpO2: 99% (01-22-20 @ 14:34) (95% - 100%)  Wt(kg): --  I&O's Summary    21 Jan 2020 07:01  -  22 Jan 2020 07:00  --------------------------------------------------------  IN: 900 mL / OUT: 3400 mL / NET: -2500 mL      Height (cm): 167.6 (01-22 @ 00:25)  Weight (kg): 86.5 (01-22 @ 00:25)  BMI (kg/m2): 30.8 (01-22 @ 00:25)  BSA (m2): 1.96 (01-22 @ 00:25)    Appearance: Normal	  HEENT:   Normal oral mucosa, PERRL, EOMI	  Lymphatic: No lymphadenopathy , no edema  Cardiovascular: Normal S1 S2, No JVD, No murmurs , Peripheral pulses palpable 2+ bilaterally  Respiratory: Lungs clear to auscultation, normal effort 	  Gastrointestinal:  Soft, Non-tender, + BS	  Skin: No rashes, No ecchymoses, No cyanosis, warm to touch  Musculoskeletal: Normal range of motion, normal strength  Psychiatry:  Mood & affect appropriate    TELEMETRY: 	    ECG:  	  RADIOLOGY:   DIAGNOSTIC TESTING:  [ ] Echocardiogram: < from: TTE with Doppler (w/Cont) (01.22.20 @ 10:25) >  Conclusions:  1. Mitral annular calcification and calcified mitral  leaflets. Mild mitral regurgitation.  Peak mitral valve  gradient equals 7 mm Hg, mean transmitral valve gradient  equals 2 mm Hg, consistent with mild mitral stenosis.  Gradients measured at a HR of 72 bpm.  2. Endocardial visualization enhanced with intravenous  injection of Ultrasonic Enhancing Agent (Definity). No left  ventricular thrombus. Normal left ventricular systolic  function. No segmental wall motion abnormalities. Septal  motion consistent with right ventricular overload.  3. Severe reversible diastolic dysfunction (Stage III).  4. Moderate right atrial enlargement.  5. Right ventricular enlargement with decreased right  ventricular systolic function.  6. Normal pericardium with no pericardial effusion.  *** Compared with echocardiogram of 9/26/2019, the right  ventricle is now enlarged with decreased function.  Consider further workup including and evaluation for  pulmonary disease.    < end of copied text >    [ ]  Catheterization: < from: Cardiac Cath Lab - Adult (09.16.19 @ 12:28) >  CORONARY VESSELS: The coronary circulation is right dominant.  LM:   --  Ostial LM: There was a discrete 90 % stenosis.  LAD:   --  Mid LAD: Angiography showed minor luminal irregularities with no  flow limiting lesions. The stent in the mid LAD is patent.  --  Distal LAD: The vessel was small sized. Angiography showed moderate  atherosclerosis.  CX:   --  Mid circumflex: There was a tubular 50 % stenosis at the site of  a prior stent.  RI:   --  Proximal ramus intermedius: There was a diffuse 50 % stenosis at  the site of a prior stent.  RCA:   --  Mid RCA: There was a tubular 50 % stenosis at the site of a  prior stent.  --  RPLS: Angiography showed minor luminal irregularities with no flow  limiting lesions.  COMPLICATIONS: There were no complications.  DIAGNOSTIC RECOMMENDATIONS: Consultation with a cardiac surgeon be obtained  for surgical opinion and coronary artery bypass grafting.    < end of copied text >    [ ] Stress Test:    OTHER: 	      ASSESSMENT/PLAN: 68 y/o male well known to our office (Cardiologist - Dr. Méndez) with CAD s/p multiple stents and recent CABG in 9/2019 with course c/b Afib on coumadin, HTN, HLD, DM2, and ESRD on HD presents with lethargy, cough/SOB, LE edema and reported syncopal episode 1 week ago found to be volume overloaded with +parainfluenza and episode of syncope one week ago.     -Volume status and dyspnea improved  -TTE with normal LV function but with decreased RV function   -Admission CT negative for PE - recommend pulm eval  -Pt. INR subtherapeutic however complains of epistaxis and therefore bridging on hold  -Follow up ENT  -Recommend lifelong ac if no contraindications  -CTS eval for possible wound dehiscence seen on ct scan  -Further workup pending above    Kalie Lau MD

## 2020-01-22 NOTE — CONSULT NOTE ADULT - ASSESSMENT
69M with CAD s/p multiple stents s/p CABG (9/2019), HTN, HLD, DM2, Afib on coumadin and ESRD on HD, presenting from home with increased lethargy and cough found to be parainfluenza positive. Pulmonary consult called for pleural effusion which is in the setting of fluid overload and chronic since CABG in September. Agree with continuing fluid removal through HD. Pleural effusion is small, and not likely the etiology of dyspnea. Patient also has parainfluenza infection that can be contributing. Bevespi is a LAMA/LABA combination, Spiriva is a LAMA. Given patient is on ATC Duigor would recommend holding Bevespi and Spiriva. Patient also reports sinus congestion - recommend Flonase BID and nasal saline. Will follow with you. 69M with CAD s/p multiple stents s/p CABG (9/2019), HTN, HLD, DM2, Afib on coumadin and ESRD on HD, presenting from home with increased lethargy and cough found to be parainfluenza positive. Pulmonary consult called for pleural effusion which is in the setting of fluid overload and chronic since CABG in September. Agree with continuing fluid removal through HD. Pleural effusion is small, and not likely the etiology of dyspnea. Patient with  Patient also has parainfluenza infection that can be contributing. Bevespi is a LAMA/LABA combination, Spiriva is a LAMA. Given patient is on ATC Duigor would recommend holding Bevespi and Spiriva. Patient also reports sinus congestion - recommend Flonase BID and nasal saline. Will follow with you. 69M with CAD s/p multiple stents s/p CABG (9/2019), HTN, HLD, DM2, Afib on coumadin and ESRD on HD, presenting from home with increased lethargy and cough found to be parainfluenza positive. Pulmonary consult called for pleural effusion which is in the setting of fluid overload and chronic since CABG in September.   - Agree with continuing fluid removal through HD. Pleural effusion is small, and not likely the primary etiology of dyspnea.   - Newly found to have RV enlargement with decreased RV systolic function. CTPA negative for PE. Eventual RHC may be beneficial once patient is euvolemic for better characterization of etiology but suspect cardiac given severe diastolic dysfunction and Afib. Do not suspect PAH but can send RF, anti-ccp, anti-centromere, CK, aldolase, myositis panel, MyoMarker Panel 3 (includes anti-MDA5 antibodies; needs a paper requisition), ANCA, SCL-70 ab, CHAY, DsDNA, anti-RO, anti-LA, IgG4, RNP (order as "extractable nuclear antigens"), Maryanne-1 antibodies, and HIV antibodies.   - Patient also has parainfluenza infection that is contributing. Continue duonebs Q6H. No wheezing on exam - can hold off on systemic steroids. Bevespi is a LAMA/LABA combination, Spiriva is a LAMA. Given patient is on ATC Duonebs would recommend holding Bevespi and Spiriva.   - Patient also reports sinus congestion - recommend Flonase BID and nasal saline.     Will follow with you.

## 2020-01-22 NOTE — CONSULT NOTE ADULT - PROBLEM SELECTOR RECOMMENDATION 9
- Recommend nasal saline to B/L nares QID  - No acute ENT intervention required at this time   - Call with questions or concerns - Recommend nasal saline to B/L nares QID  - No acute ENT intervention required at this time   - Patient should follow up in ENT office as an outpatient. May see Dr. Servin or [place]. Call [phone].   - Call with questions or concerns

## 2020-01-22 NOTE — CONSULT NOTE ADULT - SUBJECTIVE AND OBJECTIVE BOX
CC: epistaxis    HPI: 69M with CAD s/p multiple stents, recently s/p CABG 9/2019, HTN, HLD, DM2, Afib on coumadin and ESRD on HD, admitted with increased lethargy and cough. Found to have parainfluenza. Now complaining of "2 or 3 drops of blood" when he sneezed earlier today. Not complaining of active bleeding at this time. Pt denies h/a, recent URI, congestion, facial pain, facial tenderness, rhinorrhea, PND or changes in vision.       PAST MEDICAL & SURGICAL HISTORY:  ESRD (end stage renal disease) on dialysis: T-Th-Sat  Stented coronary artery: total 15 stents from [id number]  Hyperthyroidism  H/O: CVA: after cardiac stent placed 12/15/09 -no residual  Heart Attack: 2/1/07 with subsequent stent  Coronary Stent: [id number] 10 stents,  to Ramus 1/2015, cath 01/2016 ( see results in HPI)  Hypercholesterolemia  CAD (Coronary Artery Disease)  DM (Diabetes Mellitus): x 4 yrs without N/N/R  HTN (Hypertension)  Hemodialysis access, AV graft: 5/2015, L arm  S/P cataract extraction  Boil of Buttock: 2006 and 2008    Allergies    lisinopril (Other)    Intolerances      MEDICATIONS  (STANDING):  albuterol/ipratropium for Nebulization 3 milliLiter(s) Nebulizer every 6 hours  aMIOdarone    Tablet 200 milliGRAM(s) Oral daily  aspirin enteric coated 81 milliGRAM(s) Oral daily  calcium acetate 667 milliGRAM(s) Oral three times a day with meals  dextrose 5%. 1000 milliLiter(s) (50 mL/Hr) IV Continuous <Continuous>  dextrose 50% Injectable 12.5 Gram(s) IV Push once  dextrose 50% Injectable 25 Gram(s) IV Push once  dextrose 50% Injectable 25 Gram(s) IV Push once  glycopyrrolate 9 MICROgram(s)/formoterol 4.8 MICROgram(s) Inhaler 2 Puff(s) Inhalation two times a day  insulin glargine Injectable (LANTUS) 14 Unit(s) SubCutaneous at bedtime  insulin lispro (HumaLOG) corrective regimen sliding scale   SubCutaneous three times a day before meals  insulin lispro (HumaLOG) corrective regimen sliding scale   SubCutaneous at bedtime  insulin lispro Injectable (HumaLOG) 5 Unit(s) SubCutaneous three times a day before meals  ketotifen 0.025% Ophthalmic Solution 1 Drop(s) Both EYES daily  methimazole 5 milliGRAM(s) Oral daily  metoprolol succinate  milliGRAM(s) Oral daily  pantoprazole    Tablet 40 milliGRAM(s) Oral before breakfast  simvastatin 40 milliGRAM(s) Oral at bedtime  tiotropium 18 MICROgram(s) Capsule 1 Capsule(s) Inhalation daily  warfarin 7 milliGRAM(s) Oral once    MEDICATIONS  (PRN):  acetaminophen   Tablet .. 650 milliGRAM(s) Oral every 6 hours PRN Temp greater or equal to 38C (100.4F), Mild Pain (1 - 3)  dextrose 40% Gel 15 Gram(s) Oral once PRN Blood Glucose LESS THAN 70 milliGRAM(s)/deciliter  glucagon  Injectable 1 milliGRAM(s) IntraMuscular once PRN Glucose LESS THAN 70 milligrams/deciliter  guaiFENesin   Syrup  (Sugar-Free) 100 milliGRAM(s) Oral every 6 hours PRN Cough      Social History: Lives at home with wife. Denies smoking, etoh or recreational drug use.    Family history: Family history of diabetes mellitus, Age at diagnosis: Age Unknown.    ROS:   ENT: all negative except as noted in HPI   CV: denies palpitations  Pulm: denies SOB, cough, hemoptysis  GI: denies change in appetite indigestion, n/v  : denies pertinent urinary symptoms, urgency  Neuro: denies numbness/tingling, loss of sensation  Psych: denies anxiety  MS: denies muscle weakness, instability  Heme: denies easy bruising or bleeding  Endo: denies heat/cold intolerance, excessive sweating  Vascular: denies LE edema    Vital Signs Last 24 Hrs  T(C): 36.8 (22 Jan 2020 14:34), Max: 37.1 (22 Jan 2020 05:04)  T(F): 98.3 (22 Jan 2020 14:34), Max: 98.7 (22 Jan 2020 05:04)  HR: 75 (22 Jan 2020 14:34) (75 - 84)  BP: 123/73 (22 Jan 2020 14:34) (113/70 - 138/81)  BP(mean): --  RR: 18 (22 Jan 2020 14:34) (17 - 18)  SpO2: 99% (22 Jan 2020 14:34) (95% - 100%)                          12.8   9.21  )-----------( 117      ( 22 Jan 2020 06:39 )             42.7    01-22    129<L>  |  89<L>  |  44<H>  ----------------------------<  112<H>  3.9   |  27  |  6.74<H>    Ca    9.1      22 Jan 2020 06:34  Phos  5.3     01-22  Mg     2.2     01-21     PT/INR - ( 22 Jan 2020 06:37 )   PT: 18.3 sec;   INR: 1.58 ratio         PTT - ( 22 Jan 2020 06:37 )  PTT:30.4 sec    PHYSICAL EXAM:  Gen: NAD  Skin: No rashes, bruises, or lesions  Head: Normocephalic, Atraumatic  Face: no edema, erythema, or fluctuance. Parotid glands soft without mass  Eyes: no scleral injection  Nose: Dried blood noted on anterior septum of left nare. No active bleeding. Nares bilaterally patent, no discharge  Mouth: No blood in posterior pharynx. No Stridor / Drooling / Trismus.  Mucosa moist, tongue/uvula midline, oropharynx clear  Neck: Flat, supple, no lymphadenopathy, trachea midline, no masses  Lymphatic: No lymphadenopathy  Resp: breathing easily, no stridor  CV: no peripheral edema/cyanosis  GI: nondistended   Peripheral vascular: no JVD or edema  Neuro: facial nerve intact, no facial droop

## 2020-01-22 NOTE — PROGRESS NOTE ADULT - PROBLEM SELECTOR PLAN 1
Likely multifactorial from parainfluenza, pleural effusion and from volume overload  from new decompensated CHF (BNP 36,000 and CAD) or ESRD  hypoxic to 88% RA, not on home O2; now on 3LNC, saturating well, no respiratory distress / will titrade down as he is saturating well   L pleural effusion on CXR, b/l LE edema; however, does not make urine so no role for diuresis   Renal has seen patient and is having extra HD today   titrate O2 for sat >92%  Titrate down as tolerated today.

## 2020-01-22 NOTE — PROGRESS NOTE ADULT - PROBLEM SELECTOR PLAN 4
pt with syncopal episode one week prior to admission as he states   as reported, wife who witnessed fall states no head strike; however, given pt on coumadin CT head  was done and showed hypodense area noted / MRI without contrast is ordered / neuro consult is called  monitor on tele   trop downtrending, no acute ischemic changes, RBBB on EKG noted on prior EKGs    TTE with normal EF and severe Diastolic heart failure and new RV dysfunction .  Pulm consult was called

## 2020-01-22 NOTE — PROGRESS NOTE ADULT - SUBJECTIVE AND OBJECTIVE BOX
Patient seen and examined  no complaints    lisinopril (Other)    Hospital Medications:   MEDICATIONS  (STANDING):  albuterol/ipratropium for Nebulization 3 milliLiter(s) Nebulizer every 6 hours  aMIOdarone    Tablet 200 milliGRAM(s) Oral daily  aspirin enteric coated 81 milliGRAM(s) Oral daily  calcium acetate 667 milliGRAM(s) Oral three times a day with meals  dextrose 5%. 1000 milliLiter(s) (50 mL/Hr) IV Continuous <Continuous>  dextrose 50% Injectable 12.5 Gram(s) IV Push once  dextrose 50% Injectable 25 Gram(s) IV Push once  dextrose 50% Injectable 25 Gram(s) IV Push once  glycopyrrolate 9 MICROgram(s)/formoterol 4.8 MICROgram(s) Inhaler 2 Puff(s) Inhalation two times a day  insulin glargine Injectable (LANTUS) 14 Unit(s) SubCutaneous at bedtime  insulin lispro (HumaLOG) corrective regimen sliding scale   SubCutaneous three times a day before meals  insulin lispro (HumaLOG) corrective regimen sliding scale   SubCutaneous at bedtime  insulin lispro Injectable (HumaLOG) 5 Unit(s) SubCutaneous three times a day before meals  ketotifen 0.025% Ophthalmic Solution 1 Drop(s) Both EYES daily  methimazole 5 milliGRAM(s) Oral daily  metoprolol succinate  milliGRAM(s) Oral daily  pantoprazole    Tablet 40 milliGRAM(s) Oral before breakfast  simvastatin 40 milliGRAM(s) Oral at bedtime  tiotropium 18 MICROgram(s) Capsule 1 Capsule(s) Inhalation daily  warfarin 7 milliGRAM(s) Oral once    VITALS:  T(F): 98.3 (01-22-20 @ 10:19), Max: 98.7 (01-22-20 @ 05:04)  HR: 80 (01-22-20 @ 10:19)  BP: 114/69 (01-22-20 @ 10:19)  RR: 18 (01-22-20 @ 10:19)  SpO2: 100% (01-22-20 @ 10:19)  Wt(kg): --    01-21 @ 07:01  -  01-22 @ 07:00  --------------------------------------------------------  IN: 900 mL / OUT: 3400 mL / NET: -2500 mL      Height (cm): 167.6 (01-22 @ 00:25)  Weight (kg): 86.5 (01-22 @ 00:25)  BMI (kg/m2): 30.8 (01-22 @ 00:25)  BSA (m2): 1.96 (01-22 @ 00:25)    PHYSICAL EXAM:  Constitutional: NAD  HEENT: anicteric sclera, oropharynx clear, MMM  Neck: No JVD  Respiratory: CTAB, no wheezes, rales or rhonchi  Cardiovascular: S1, S2, RRR  Gastrointestinal: BS+, soft, NT/ND  Extremities: ++ peripheral edema  Neurological: A/O x 3, no focal deficits  Psychiatric: Normal mood, normal affect  : No CVA tenderness. No wagner.   Skin: No rashes  Vascular Access: LUE AV access +thrill and bruit.     LABS:  01-22    129<L>  |  89<L>  |  44<H>  ----------------------------<  112<H>  3.9   |  27  |  6.74<H>    Ca    9.1      22 Jan 2020 06:34  Phos  5.3     01-22  Mg     2.2     01-21    TPro  7.5  /  Alb  4.1  /  TBili  0.5  /  DBili      /  AST  12  /  ALT  19  /  AlkPhos  58  01-20    Creatinine Trend: 6.74 <--, 9.07 <--, 8.36 <--                        12.8   9.21  )-----------( 117      ( 22 Jan 2020 06:39 )             42.7     Urine Studies:      RADIOLOGY & ADDITIONAL STUDIES:

## 2020-01-22 NOTE — CHART NOTE - NSCHARTNOTEFT_GEN_A_CORE
70 y/o male s/p 9/22/19 CABGx3 readmit with SOB and cough. Requested by primary team for consult on small to mod left pleural effusion on CT Chest and new RV dysfunction on TTE. As per Pulmonary consult - Pleural effusion is small, and not likely the primary etiology of dyspnea.       < from: CT Angio Chest w/ IV Cont (01.20.20 @ 19:09) >  IMPRESSION:   1. Evaluation of the subsegmental pulmonary arteries in the lung bases are limited secondary to motion artifact. No pulmonary embolism in the remaining visualized pulmonary arteries.   2. Partially loculated small to moderate left pleural effusion with compressive atelectasis of part of the left lower lobe.  3. Diastasis of the median sternotomy measuring up to 1.4 cm with intact sternotomy wires which could be postsurgical in nature versus sternal dehiscence. Correlation with operative report or prior CT chest examination would be useful to determine stability.    < from: TTE with Doppler (w/Cont) (01.22.20 @ 10:25) >  Conclusions:  1. Mitral annular calcification and calcified mitral  leaflets. Mild mitral regurgitation.  Peak mitral valve  gradient equals 7 mm Hg, mean transmitral valve gradient  equals 2 mm Hg, consistent with mild mitral stenosis.  Gradients measured at a HR of 72 bpm.  2. Endocardial visualization enhanced with intravenous  injection of Ultrasonic Enhancing Agent (Definity). No left  ventricular thrombus. Normal left ventricular systolic  function. No segmental wall motion abnormalities. Septal  motion consistent with right ventricular overload.  3. Severe reversible diastolic dysfunction (Stage III).  4. Moderate right atrial enlargement.  5. Right ventricular enlargement with decreased right  ventricular systolic function.  6. Normal pericardium with no pericardial effusion.  *** Compared with echocardiogram of 9/26/2019, the right  ventricle is now enlarged with decreased function.  Consider further workup including and evaluation for  pulmonary disease.    Findings discussed with Dr. Vasquez, no CT Surgery consult or intervention warranted at this time. Continue medical management as per primary team, recommend consulting heart failure for RV dysfunction if indicated.

## 2020-01-22 NOTE — CONSULT NOTE ADULT - SUBJECTIVE AND OBJECTIVE BOX
[facility] DIVISION OF PULMONARY, CRITICAL CARE AND SLEEP MEDICINE  PULMONARY CONSULTATION NOTE  OFFICE NUMBER: [phone]    NAME: VIOLETTA RENTERIA  MEDICAL RECORD NUMBER: [id number]    CHIEF COMPLAINT: Patient is a 69y old  Male who presents with a chief complaint of lethargy, coughing, SOB (22 Jan 2020 15:03)      HISTORY OF PRESENT ILLNESS:       ====================BACKGROUND INFORMATION============================    FAMILY HISTORY: FAMILY HISTORY:  Family history of coronary artery disease  Family history of diabetes mellitus (Sibling)      PAST MEDICAL AND SURGICAL HISTORY: PAST MEDICAL & SURGICAL HISTORY:  ESRD (end stage renal disease) on dialysis: T-Th-Sat  Stented coronary artery: total 15 stents from [id number]  Hyperthyroidism  H/O: CVA: after cardiac stent placed 12/15/09 -no residual  Heart Attack: 2/1/07 with subsequent stent  Coronary Stent: [id number] 10 stents,  to Ramus 1/2015, cath 01/2016 ( see results in HPI)  Hypercholesterolemia  CAD (Coronary Artery Disease)  DM (Diabetes Mellitus): x 4 yrs without N/N/R  HTN (Hypertension)  Hemodialysis access, AV graft: 5/2015, L arm  S/P cataract extraction  Boil of Buttock: 2006 and 2008      ALLERGIES:Allergies    lisinopril (Other)    Intolerances        HOME MEDICATIONS:     OUTPATIENT PULMONARY DOCTOR:     SOCIAL HISTORY:  TOBACCO USE: Fomer  ALCOHOL: Former  MARITAL STATUS:       ====================REVIEW OF SYSTEMS=====================================  CONSTITUTIONAL: Malaise, generalized weakness  CARDIOVASCULAR: No chest pain. Orthopnea  PULMONARY: Cough, shortness of breath  GASTROINTESTINAL: No nausea, vomiting  [x] ALL OTHER REVIEW OF SYSTEMS ARE NEGATIVE       ====================PHYSICAL EXAM=========================================    VITALS: ICU Vital Signs Last 24 Hrs  T(C): 36.8 (22 Jan 2020 14:34), Max: 37.1 (22 Jan 2020 05:04)  T(F): 98.3 (22 Jan 2020 14:34), Max: 98.7 (22 Jan 2020 05:04)  HR: 75 (22 Jan 2020 14:34) (75 - 84)  BP: 123/73 (22 Jan 2020 14:34) (113/70 - 138/81)  RR: 18 (22 Jan 2020 14:34) (17 - 18)  SpO2: 99% (22 Jan 2020 14:34) (95% - 100%)      INTAKE and OUTPUT: I&O's Summary    21 Jan 2020 07:01  -  22 Jan 2020 07:00  --------------------------------------------------------  IN: 900 mL / OUT: 3400 mL / NET: -2500 mL        WEIGHT: Daily Height in cm: 167.64 (22 Jan 2020 00:25)    Daily     GLUCOSE: CAPILLARY BLOOD GLUCOSE      POCT Blood Glucose.: 129 mg/dL (22 Jan 2020 13:40)    GENERAL: NCAT  HEENT: PERRL  NECK: Supple  CARDIOVASCULAR: RRR. S1. S2 +  PULMONARY: Clear to ausculation. No wheezing.  ABDOMEN: Soft, NTND  SKIN: Warm, intact  EXTREMITIES: 1 + pitting edema of LE extremities  NEUROLOGIC: AAO x3.   PSYCHIATRIC: Normal affect and mood    ====================MEDICATIONS===========================================  MEDICATIONS  (STANDING):  albuterol/ipratropium for Nebulization 3 milliLiter(s) Nebulizer every 6 hours  aMIOdarone    Tablet 200 milliGRAM(s) Oral daily  aspirin enteric coated 81 milliGRAM(s) Oral daily  calcium acetate 667 milliGRAM(s) Oral three times a day with meals  dextrose 5%. 1000 milliLiter(s) (50 mL/Hr) IV Continuous <Continuous>  dextrose 50% Injectable 12.5 Gram(s) IV Push once  dextrose 50% Injectable 25 Gram(s) IV Push once  dextrose 50% Injectable 25 Gram(s) IV Push once  glycopyrrolate 9 MICROgram(s)/formoterol 4.8 MICROgram(s) Inhaler 2 Puff(s) Inhalation two times a day  insulin glargine Injectable (LANTUS) 14 Unit(s) SubCutaneous at bedtime  insulin lispro (HumaLOG) corrective regimen sliding scale   SubCutaneous three times a day before meals  insulin lispro (HumaLOG) corrective regimen sliding scale   SubCutaneous at bedtime  insulin lispro Injectable (HumaLOG) 5 Unit(s) SubCutaneous three times a day before meals  ketotifen 0.025% Ophthalmic Solution 1 Drop(s) Both EYES daily  methimazole 5 milliGRAM(s) Oral daily  metoprolol succinate  milliGRAM(s) Oral daily  pantoprazole    Tablet 40 milliGRAM(s) Oral before breakfast  simvastatin 40 milliGRAM(s) Oral at bedtime  tiotropium 18 MICROgram(s) Capsule 1 Capsule(s) Inhalation daily  warfarin 7 milliGRAM(s) Oral once      MEDICATIONS  (PRN):  acetaminophen   Tablet .. 650 milliGRAM(s) Oral every 6 hours PRN Temp greater or equal to 38C (100.4F), Mild Pain (1 - 3)  dextrose 40% Gel 15 Gram(s) Oral once PRN Blood Glucose LESS THAN 70 milliGRAM(s)/deciliter  glucagon  Injectable 1 milliGRAM(s) IntraMuscular once PRN Glucose LESS THAN 70 milligrams/deciliter  guaiFENesin   Syrup  (Sugar-Free) 100 milliGRAM(s) Oral every 6 hours PRN Cough      ====================LABORATORY RESULTS====================================  CBC Full  -  ( 22 Jan 2020 06:39 )  WBC Count : 9.21 K/uL  RBC Count : 4.69 M/uL  Hemoglobin : 12.8 g/dL  Hematocrit : 42.7 %  Platelet Count - Automated : 117 K/uL  Mean Cell Volume : 91.0 fl  Mean Cell Hemoglobin : 27.3 pg  Mean Cell Hemoglobin Concentration : 30.0 gm/dL  Auto Neutrophil # : x  Auto Lymphocyte # : x  Auto Monocyte # : x  Auto Eosinophil # : x  Auto Basophil # : x  Auto Neutrophil % : x  Auto Lymphocyte % : x  Auto Monocyte % : x  Auto Eosinophil % : x  Auto Basophil % : x                                    01-22    129<L>  |  89<L>  |  44<H>  ----------------------------<  112<H>  3.9   |  27  |  6.74<H>    Ca    9.1      22 Jan 2020 06:34  Phos  5.3     01-22  Mg     2.2     01-21          PT/INR - ( 22 Jan 2020 06:37 )   PT: 18.3 sec;   INR: 1.58 ratio         PTT - ( 22 Jan 2020 06:37 )  PTT:30.4 sec    Creatinine Trend: 6.74<--, 9.07<--, 8.36<--      ====================RADIOLOGY and ECHOCARDIOGRAPHY=======================    CXR: Reviewed by me  < from: Xray Chest 2 Views PA/Lat (01.20.20 @ 18:38) >    EXAM:  XR CHEST PA LAT 2V                            PROCEDURE DATE:  01/20/2020        INTERPRETATION:  CLINICAL INFORMATION: Cough.    EXAM: PA and lateral chest radiographs.    COMPARISON: Chest radiograph from 10/5/2019.    FINDINGS:  No signs overlie the left axilla and left subclavian region.  Status post sternotomy and CABG. Heart size cannot be properly assessed in this projection.  Left pleural effusion, underlying pneumonia and/or atelectasis cannot be ruled out.  The visualized osseous structures demonstrate no acute pathology.    IMPRESSION:  Moderate left pleural effusion, underlying pneumonia and/or atelectasis cannot be ruled out.    ROXANE KUMAR M.D., RADIOLOGY RESIDENT  This document has been electronically signed.  HUDSON DURÁN M.D., ATTENDING RADIOLOGIST  This document has been electronically signed. Jan 21 2020 10:31AM        < end of copied text >      CT CHEST: Reviewed by me  < from: CT Angio Chest w/ IV Cont (01.20.20 @ 19:09) >  EXAM:  CT ANGIO CHEST (W)AW IC                            PROCEDURE DATE:  01/20/2020        INTERPRETATION:  CLINICAL INFORMATION: Cough and shortness of breath. Evaluate for pulmonary embolism.     COMPARISON: None available    PROCEDURE:   CT Angiography of the Chest.  90 ml of Omnipaque 350 was injected intravenously. 10 ml were discarded.  Sagittal and coronal reformats were performed as well as 3D (MIP) reconstructions.    FINDINGS:    LUNGS AND AIRWAYS: Patent central airways. No pulmonary nodules, masses or consolidation. Bibasilar subsegmental atelectasis. Compressive atelectasis of part of the left lower lobe.     PLEURA: Small-to-moderate partially loculated left pleural effusion. No pneumothorax.    MEDIASTINUM AND YOANA: No lymphadenopathy.    VESSELS: Evaluation of the subsegmental pulmonary arteries in the lung bases are limited secondary to motion artifact. No pulmonary embolism in the remaining visualized pulmonary arteries. The aorta and main pulmonary artery are normal in size. Aortic atherosclerosis. Multiple left subclavian vein stents.    HEART: Status post CABG. Retained epicardial pacing leads. Cardiomegaly. No pericardial effusion. Moderate coronary artery calcifications.    CHEST WALL AND LOWER NECK: Bilateral gynecomastia. Mild subcutaneous inflammatory change in the midline anterior chest wall.    VISUALIZED UPPER ABDOMEN: Within normal limits.    BONES: Diastasis of the median sternotomy measuring up to 1.4 cm with sternotomy wires intact. Degenerative changes of the spine.    IMPRESSION:     1. Evaluation of the subsegmental pulmonary arteries in the lung bases are limited secondary to motion artifact. No pulmonary embolism in the remaining visualized pulmonary arteries.   2. Partially loculated small to moderate left pleural effusion with compressive atelectasis of part of the left lower lobe.  3. Diastasis of the median sternotomy measuring up to 1.4 cm with intact sternotomy wires which could be postsurgical in nature versus sternal dehiscence. Correlation with operative report or prior CT chest examination would be useful to determine stability.    OBIE BAIRD M.D., RADIOLGY RESIDENT  This document has been electronically signed.  HUMA KANG M.D., ATTENDING RADIOLOGIST    < end of copied text > [facility] DIVISION OF PULMONARY, CRITICAL CARE AND SLEEP MEDICINE  PULMONARY CONSULTATION NOTE  OFFICE NUMBER: [phone]    CHIEF COMPLAINT: Patient is a 69y old  Male who presents with a chief complaint of lethargy, coughing, SOB (22 Jan 2020 15:03)      HISTORY OF PRESENT ILLNESS:   69M with CAD s/p multiple stents s/p CABG (9/2019), HTN, HLD, DM2, Afib on coumadin and ESRD on HD, presenting from home with increased lethargy and cough. Endorses SOB and LE edema since CABG in September and not fully returned to his baseline. Also developed cough about 1 month prior, not productive of sputum. Worsening cough and dyspnea over the past week. Patient has been having difficulty sleeping, unable to lie flat.  Denies recent fevers or chills. Reports nasal congestion. Denies chest pain, palpitation, nausea, vomiting, diarrhea, or abdominal pain.  Found to be hypoxemic to 88% on admission. Found to have left pleural effusion so pulmonary consulted. RVP also + for parainfluenza. Currently patient reports feeling better and is able to sleep better and lie more flat. Patient reports history of asthma - does not have a pulmonologist. Uses Bevespi, Spirva, and ProAir at home.    ====================BACKGROUND INFORMATION============================    FAMILY HISTORY: FAMILY HISTORY:  Family history of coronary artery disease  Family history of diabetes mellitus (Sibling)      PAST MEDICAL AND SURGICAL HISTORY: PAST MEDICAL & SURGICAL HISTORY:  ESRD (end stage renal disease) on dialysis: T-Th-Sat  Stented coronary artery: total 15 stents from 0629-9278  Hyperthyroidism  H/O: CVA: after cardiac stent placed 12/15/09 -no residual  Heart Attack: 2/1/07 with subsequent stent  Coronary Stent: [id number] 10 stents,  to Ramus 1/2015, cath 01/2016 ( see results in HPI)  Hypercholesterolemia  CAD (Coronary Artery Disease)  DM (Diabetes Mellitus): x 4 yrs without N/N/R  HTN (Hypertension)  Hemodialysis access, AV graft: 5/2015, L arm  S/P cataract extraction  Boil of Buttock: 2006 and 2008      ALLERGIES:Allergies    lisinopril (Other)    Intolerances        HOME MEDICATIONS:   azelastine 0.05% ophthalmic solution: 1 drop(s) to each affected eye 2 times a day (20 Jan 2020 23:37)  Bevespi Aerosphere 9 mcg-4.8 mcg/inh inhalation aerosol: 2 puff(s) inhaled 2 times a day (20 Jan 2020 23:37)  calcium acetate 667 mg oral tablet: 1 tab(s) orally 3 times a day (20 Jan 2020 23:37)  omeprazole 40 mg oral delayed release capsule: 1 cap(s) orally once a day (20 Jan 2020 23:37)  ProAir RespiClick 90 mcg/inh inhalation powder: 2 puff(s) inhaled every 4 hours, As Needed (20 Jan 2020 23:37)  Spiriva Respimat: 2.5 microgram(s) inhaled 2 times a day (20 Jan 2020 23:37)      OUTPATIENT PULMONARY DOCTOR:     SOCIAL HISTORY:  TOBACCO USE: Fomer  ALCOHOL: Former  MARITAL STATUS:       ====================REVIEW OF SYSTEMS=====================================  CONSTITUTIONAL: Malaise, generalized weakness  CARDIOVASCULAR: No chest pain. Orthopnea  PULMONARY: Cough, shortness of breath  GASTROINTESTINAL: No nausea, vomiting  [x] ALL OTHER REVIEW OF SYSTEMS ARE NEGATIVE       ====================PHYSICAL EXAM=========================================    VITALS: ICU Vital Signs Last 24 Hrs  T(C): 36.8 (22 Jan 2020 14:34), Max: 37.1 (22 Jan 2020 05:04)  T(F): 98.3 (22 Jan 2020 14:34), Max: 98.7 (22 Jan 2020 05:04)  HR: 75 (22 Jan 2020 14:34) (75 - 84)  BP: 123/73 (22 Jan 2020 14:34) (113/70 - 138/81)  RR: 18 (22 Jan 2020 14:34) (17 - 18)  SpO2: 99% (22 Jan 2020 14:34) (95% - 100%)      INTAKE and OUTPUT: I&O's Summary    21 Jan 2020 07:01  -  22 Jan 2020 07:00  --------------------------------------------------------  IN: 900 mL / OUT: 3400 mL / NET: -2500 mL        WEIGHT: Daily Height in cm: 167.64 (22 Jan 2020 00:25)    Daily     GLUCOSE: CAPILLARY BLOOD GLUCOSE      POCT Blood Glucose.: 129 mg/dL (22 Jan 2020 13:40)    GENERAL: NCAT  HEENT: PERRL  NECK: Supple  CARDIOVASCULAR: RRR. S1. S2 +  PULMONARY: Clear to ausculation. No wheezing.  ABDOMEN: Soft, NTND  SKIN: Warm, intact  EXTREMITIES: 1 + pitting edema of LE extremities  NEUROLOGIC: AAO x3.   PSYCHIATRIC: Normal affect and mood    ====================MEDICATIONS===========================================  MEDICATIONS  (STANDING):  albuterol/ipratropium for Nebulization 3 milliLiter(s) Nebulizer every 6 hours  aMIOdarone    Tablet 200 milliGRAM(s) Oral daily  aspirin enteric coated 81 milliGRAM(s) Oral daily  calcium acetate 667 milliGRAM(s) Oral three times a day with meals  dextrose 5%. 1000 milliLiter(s) (50 mL/Hr) IV Continuous <Continuous>  dextrose 50% Injectable 12.5 Gram(s) IV Push once  dextrose 50% Injectable 25 Gram(s) IV Push once  dextrose 50% Injectable 25 Gram(s) IV Push once  glycopyrrolate 9 MICROgram(s)/formoterol 4.8 MICROgram(s) Inhaler 2 Puff(s) Inhalation two times a day  insulin glargine Injectable (LANTUS) 14 Unit(s) SubCutaneous at bedtime  insulin lispro (HumaLOG) corrective regimen sliding scale   SubCutaneous three times a day before meals  insulin lispro (HumaLOG) corrective regimen sliding scale   SubCutaneous at bedtime  insulin lispro Injectable (HumaLOG) 5 Unit(s) SubCutaneous three times a day before meals  ketotifen 0.025% Ophthalmic Solution 1 Drop(s) Both EYES daily  methimazole 5 milliGRAM(s) Oral daily  metoprolol succinate  milliGRAM(s) Oral daily  pantoprazole    Tablet 40 milliGRAM(s) Oral before breakfast  simvastatin 40 milliGRAM(s) Oral at bedtime  tiotropium 18 MICROgram(s) Capsule 1 Capsule(s) Inhalation daily  warfarin 7 milliGRAM(s) Oral once      MEDICATIONS  (PRN):  acetaminophen   Tablet .. 650 milliGRAM(s) Oral every 6 hours PRN Temp greater or equal to 38C (100.4F), Mild Pain (1 - 3)  dextrose 40% Gel 15 Gram(s) Oral once PRN Blood Glucose LESS THAN 70 milliGRAM(s)/deciliter  glucagon  Injectable 1 milliGRAM(s) IntraMuscular once PRN Glucose LESS THAN 70 milligrams/deciliter  guaiFENesin   Syrup  (Sugar-Free) 100 milliGRAM(s) Oral every 6 hours PRN Cough      ====================LABORATORY RESULTS====================================  CBC Full  -  ( 22 Jan 2020 06:39 )  WBC Count : 9.21 K/uL  RBC Count : 4.69 M/uL  Hemoglobin : 12.8 g/dL  Hematocrit : 42.7 %  Platelet Count - Automated : 117 K/uL  Mean Cell Volume : 91.0 fl  Mean Cell Hemoglobin : 27.3 pg  Mean Cell Hemoglobin Concentration : 30.0 gm/dL  Auto Neutrophil # : x  Auto Lymphocyte # : x  Auto Monocyte # : x  Auto Eosinophil # : x  Auto Basophil # : x  Auto Neutrophil % : x  Auto Lymphocyte % : x  Auto Monocyte % : x  Auto Eosinophil % : x  Auto Basophil % : x                                    01-22    129<L>  |  89<L>  |  44<H>  ----------------------------<  112<H>  3.9   |  27  |  6.74<H>    Ca    9.1      22 Jan 2020 06:34  Phos  5.3     01-22  Mg     2.2     01-21          PT/INR - ( 22 Jan 2020 06:37 )   PT: 18.3 sec;   INR: 1.58 ratio         PTT - ( 22 Jan 2020 06:37 )  PTT:30.4 sec    Creatinine Trend: 6.74<--, 9.07<--, 8.36<--      ====================RADIOLOGY and ECHOCARDIOGRAPHY=======================    CXR: Reviewed by me  < from: Xray Chest 2 Views PA/Lat (01.20.20 @ 18:38) >    EXAM:  XR CHEST PA LAT 2V                            PROCEDURE DATE:  01/20/2020        INTERPRETATION:  CLINICAL INFORMATION: Cough.    EXAM: PA and lateral chest radiographs.    COMPARISON: Chest radiograph from 10/5/2019.    FINDINGS:  No signs overlie the left axilla and left subclavian region.  Status post sternotomy and CABG. Heart size cannot be properly assessed in this projection.  Left pleural effusion, underlying pneumonia and/or atelectasis cannot be ruled out.  The visualized osseous structures demonstrate no acute pathology.    IMPRESSION:  Moderate left pleural effusion, underlying pneumonia and/or atelectasis cannot be ruled out.    ROXANE KUMAR M.D., RADIOLOGY RESIDENT  This document has been electronically signed.  HUDSON DURÁN M.D., ATTENDING RADIOLOGIST  This document has been electronically signed. Jan 21 2020 10:31AM        < end of copied text >      CT CHEST: Reviewed by me  < from: CT Angio Chest w/ IV Cont (01.20.20 @ 19:09) >  EXAM:  CT ANGIO CHEST (W)AW IC                            PROCEDURE DATE:  01/20/2020        INTERPRETATION:  CLINICAL INFORMATION: Cough and shortness of breath. Evaluate for pulmonary embolism.     COMPARISON: None available    PROCEDURE:   CT Angiography of the Chest.  90 ml of Omnipaque 350 was injected intravenously. 10 ml were discarded.  Sagittal and coronal reformats were performed as well as 3D (MIP) reconstructions.    FINDINGS:    LUNGS AND AIRWAYS: Patent central airways. No pulmonary nodules, masses or consolidation. Bibasilar subsegmental atelectasis. Compressive atelectasis of part of the left lower lobe.     PLEURA: Small-to-moderate partially loculated left pleural effusion. No pneumothorax.    MEDIASTINUM AND YOANA: No lymphadenopathy.    VESSELS: Evaluation of the subsegmental pulmonary arteries in the lung bases are limited secondary to motion artifact. No pulmonary embolism in the remaining visualized pulmonary arteries. The aorta and main pulmonary artery are normal in size. Aortic atherosclerosis. Multiple left subclavian vein stents.    HEART: Status post CABG. Retained epicardial pacing leads. Cardiomegaly. No pericardial effusion. Moderate coronary artery calcifications.    CHEST WALL AND LOWER NECK: Bilateral gynecomastia. Mild subcutaneous inflammatory change in the midline anterior chest wall.    VISUALIZED UPPER ABDOMEN: Within normal limits.    BONES: Diastasis of the median sternotomy measuring up to 1.4 cm with sternotomy wires intact. Degenerative changes of the spine.    IMPRESSION:     1. Evaluation of the subsegmental pulmonary arteries in the lung bases are limited secondary to motion artifact. No pulmonary embolism in the remaining visualized pulmonary arteries.   2. Partially loculated small to moderate left pleural effusion with compressive atelectasis of part of the left lower lobe.  3. Diastasis of the median sternotomy measuring up to 1.4 cm with intact sternotomy wires which could be postsurgical in nature versus sternal dehiscence. Correlation with operative report or prior CT chest examination would be useful to determine stability.    OBIE BAIRD M.D., RADIOLGY RESIDENT  This document has been electronically signed.  HUMA KANG M.D., ATTENDING RADIOLOGIST    < end of copied text > [facility] DIVISION OF PULMONARY, CRITICAL CARE AND SLEEP MEDICINE  PULMONARY CONSULTATION NOTE  OFFICE NUMBER: [phone]    CHIEF COMPLAINT: Patient is a 69y old  Male who presents with a chief complaint of lethargy, coughing, SOB (22 Jan 2020 15:03)      HISTORY OF PRESENT ILLNESS:   69M with CAD s/p multiple stents s/p CABG (9/2019), HTN, HLD, DM2, Afib on coumadin and ESRD on HD, presenting from home with increased lethargy and cough. Endorses SOB and LE edema since CABG in September and not fully returned to his baseline. Also developed cough about 1 month prior, not productive of sputum. Worsening cough and dyspnea over the past week. Patient has been having difficulty sleeping, unable to lie flat.  Denies recent fevers or chills. Reports nasal congestion. Denies chest pain, palpitation, nausea, vomiting, diarrhea, or abdominal pain.  Found to be hypoxemic to 88% on admission. Found to have left pleural effusion so pulmonary consulted. RVP also + for parainfluenza. Currently patient reports feeling better and is able to sleep better and lie more flat. Patient reports history of asthma - does not have a pulmonologist. Uses Bevespi, Spirva, and ProAir at home.    ====================BACKGROUND INFORMATION============================    FAMILY HISTORY: FAMILY HISTORY:  Family history of coronary artery disease  Family history of diabetes mellitus (Sibling)      PAST MEDICAL AND SURGICAL HISTORY: PAST MEDICAL & SURGICAL HISTORY:  ESRD (end stage renal disease) on dialysis: T-Th-Sat  Stented coronary artery: total 15 stents from 8511-3324  Hyperthyroidism  H/O: CVA: after cardiac stent placed 12/15/09 -no residual  Heart Attack: 2/1/07 with subsequent stent  Coronary Stent: [id number] 10 stents,  to Ramus 1/2015, cath 01/2016 ( see results in HPI)  Hypercholesterolemia  CAD (Coronary Artery Disease)  DM (Diabetes Mellitus): x 4 yrs without N/N/R  HTN (Hypertension)  Hemodialysis access, AV graft: 5/2015, L arm  S/P cataract extraction  Boil of Buttock: 2006 and 2008      ALLERGIES:Allergies    lisinopril (Other)    Intolerances        HOME MEDICATIONS:   azelastine 0.05% ophthalmic solution: 1 drop(s) to each affected eye 2 times a day (20 Jan 2020 23:37)  Bevespi Aerosphere 9 mcg-4.8 mcg/inh inhalation aerosol: 2 puff(s) inhaled 2 times a day (20 Jan 2020 23:37)  calcium acetate 667 mg oral tablet: 1 tab(s) orally 3 times a day (20 Jan 2020 23:37)  omeprazole 40 mg oral delayed release capsule: 1 cap(s) orally once a day (20 Jan 2020 23:37)  ProAir RespiClick 90 mcg/inh inhalation powder: 2 puff(s) inhaled every 4 hours, As Needed (20 Jan 2020 23:37)  Spiriva Respimat: 2.5 microgram(s) inhaled 2 times a day (20 Jan 2020 23:37)      OUTPATIENT PULMONARY DOCTOR:     SOCIAL HISTORY:  TOBACCO USE: Fomer  ALCOHOL: Former  MARITAL STATUS:       ====================REVIEW OF SYSTEMS=====================================  CONSTITUTIONAL: Malaise, generalized weakness  CARDIOVASCULAR: No chest pain. Orthopnea  PULMONARY: Cough, shortness of breath  GASTROINTESTINAL: No nausea, vomiting  [x] ALL OTHER REVIEW OF SYSTEMS ARE NEGATIVE       ====================PHYSICAL EXAM=========================================    VITALS: ICU Vital Signs Last 24 Hrs  T(C): 36.8 (22 Jan 2020 14:34), Max: 37.1 (22 Jan 2020 05:04)  T(F): 98.3 (22 Jan 2020 14:34), Max: 98.7 (22 Jan 2020 05:04)  HR: 75 (22 Jan 2020 14:34) (75 - 84)  BP: 123/73 (22 Jan 2020 14:34) (113/70 - 138/81)  RR: 18 (22 Jan 2020 14:34) (17 - 18)  SpO2: 99% (22 Jan 2020 14:34) (95% - 100%)      INTAKE and OUTPUT: I&O's Summary    21 Jan 2020 07:01  -  22 Jan 2020 07:00  --------------------------------------------------------  IN: 900 mL / OUT: 3400 mL / NET: -2500 mL        WEIGHT: Daily Height in cm: 167.64 (22 Jan 2020 00:25)    Daily     GLUCOSE: CAPILLARY BLOOD GLUCOSE      POCT Blood Glucose.: 129 mg/dL (22 Jan 2020 13:40)    GENERAL: NCAT  HEENT: PERRL  NECK: Supple  CARDIOVASCULAR: RRR. S1. S2 +  PULMONARY: Clear to ausculation. No wheezing.  ABDOMEN: Soft, NTND  SKIN: Warm, intact  EXTREMITIES: 1 + pitting edema of LE extremities  NEUROLOGIC: AAO x3.   PSYCHIATRIC: Normal affect and mood    ====================MEDICATIONS===========================================  MEDICATIONS  (STANDING):  albuterol/ipratropium for Nebulization 3 milliLiter(s) Nebulizer every 6 hours  aMIOdarone    Tablet 200 milliGRAM(s) Oral daily  aspirin enteric coated 81 milliGRAM(s) Oral daily  calcium acetate 667 milliGRAM(s) Oral three times a day with meals  dextrose 5%. 1000 milliLiter(s) (50 mL/Hr) IV Continuous <Continuous>  dextrose 50% Injectable 12.5 Gram(s) IV Push once  dextrose 50% Injectable 25 Gram(s) IV Push once  dextrose 50% Injectable 25 Gram(s) IV Push once  glycopyrrolate 9 MICROgram(s)/formoterol 4.8 MICROgram(s) Inhaler 2 Puff(s) Inhalation two times a day  insulin glargine Injectable (LANTUS) 14 Unit(s) SubCutaneous at bedtime  insulin lispro (HumaLOG) corrective regimen sliding scale   SubCutaneous three times a day before meals  insulin lispro (HumaLOG) corrective regimen sliding scale   SubCutaneous at bedtime  insulin lispro Injectable (HumaLOG) 5 Unit(s) SubCutaneous three times a day before meals  ketotifen 0.025% Ophthalmic Solution 1 Drop(s) Both EYES daily  methimazole 5 milliGRAM(s) Oral daily  metoprolol succinate  milliGRAM(s) Oral daily  pantoprazole    Tablet 40 milliGRAM(s) Oral before breakfast  simvastatin 40 milliGRAM(s) Oral at bedtime  tiotropium 18 MICROgram(s) Capsule 1 Capsule(s) Inhalation daily  warfarin 7 milliGRAM(s) Oral once      MEDICATIONS  (PRN):  acetaminophen   Tablet .. 650 milliGRAM(s) Oral every 6 hours PRN Temp greater or equal to 38C (100.4F), Mild Pain (1 - 3)  dextrose 40% Gel 15 Gram(s) Oral once PRN Blood Glucose LESS THAN 70 milliGRAM(s)/deciliter  glucagon  Injectable 1 milliGRAM(s) IntraMuscular once PRN Glucose LESS THAN 70 milligrams/deciliter  guaiFENesin   Syrup  (Sugar-Free) 100 milliGRAM(s) Oral every 6 hours PRN Cough      ====================LABORATORY RESULTS====================================  CBC Full  -  ( 22 Jan 2020 06:39 )  WBC Count : 9.21 K/uL  RBC Count : 4.69 M/uL  Hemoglobin : 12.8 g/dL  Hematocrit : 42.7 %  Platelet Count - Automated : 117 K/uL  Mean Cell Volume : 91.0 fl  Mean Cell Hemoglobin : 27.3 pg  Mean Cell Hemoglobin Concentration : 30.0 gm/dL                                    01-22    129<L>  |  89<L>  |  44<H>  ----------------------------<  112<H>  3.9   |  27  |  6.74<H>    Ca    9.1      22 Jan 2020 06:34  Phos  5.3     01-22  Mg     2.2     01-21          PT/INR - ( 22 Jan 2020 06:37 )   PT: 18.3 sec;   INR: 1.58 ratio         PTT - ( 22 Jan 2020 06:37 )  PTT:30.4 sec    Creatinine Trend: 6.74<--, 9.07<--, 8.36<--      ====================RADIOLOGY and ECHOCARDIOGRAPHY=======================    CXR: Reviewed by me  < from: Xray Chest 2 Views PA/Lat (01.20.20 @ 18:38) >    EXAM:  XR CHEST PA LAT 2V                            PROCEDURE DATE:  01/20/2020        INTERPRETATION:  CLINICAL INFORMATION: Cough.    EXAM: PA and lateral chest radiographs.    COMPARISON: Chest radiograph from 10/5/2019.    FINDINGS:  No signs overlie the left axilla and left subclavian region.  Status post sternotomy and CABG. Heart size cannot be properly assessed in this projection.  Left pleural effusion, underlying pneumonia and/or atelectasis cannot be ruled out.  The visualized osseous structures demonstrate no acute pathology.    IMPRESSION:  Moderate left pleural effusion, underlying pneumonia and/or atelectasis cannot be ruled out.    ROXANE KUMAR M.D., RADIOLOGY RESIDENT  This document has been electronically signed.  HUDSON DURÁN M.D., ATTENDING RADIOLOGIST  This document has been electronically signed. Jan 21 2020 10:31AM        < end of copied text >      CT CHEST: Reviewed by me  < from: CT Angio Chest w/ IV Cont (01.20.20 @ 19:09) >  EXAM:  CT ANGIO CHEST (W)AW IC                            PROCEDURE DATE:  01/20/2020        INTERPRETATION:  CLINICAL INFORMATION: Cough and shortness of breath. Evaluate for pulmonary embolism.     COMPARISON: None available    PROCEDURE:   CT Angiography of the Chest.  90 ml of Omnipaque 350 was injected intravenously. 10 ml were discarded.  Sagittal and coronal reformats were performed as well as 3D (MIP) reconstructions.    FINDINGS:    LUNGS AND AIRWAYS: Patent central airways. No pulmonary nodules, masses or consolidation. Bibasilar subsegmental atelectasis. Compressive atelectasis of part of the left lower lobe.     PLEURA: Small-to-moderate partially loculated left pleural effusion. No pneumothorax.    MEDIASTINUM AND YOANA: No lymphadenopathy.    VESSELS: Evaluation of the subsegmental pulmonary arteries in the lung bases are limited secondary to motion artifact. No pulmonary embolism in the remaining visualized pulmonary arteries. The aorta and main pulmonary artery are normal in size. Aortic atherosclerosis. Multiple left subclavian vein stents.    HEART: Status post CABG. Retained epicardial pacing leads. Cardiomegaly. No pericardial effusion. Moderate coronary artery calcifications.    CHEST WALL AND LOWER NECK: Bilateral gynecomastia. Mild subcutaneous inflammatory change in the midline anterior chest wall.    VISUALIZED UPPER ABDOMEN: Within normal limits.    BONES: Diastasis of the median sternotomy measuring up to 1.4 cm with sternotomy wires intact. Degenerative changes of the spine.    IMPRESSION:     1. Evaluation of the subsegmental pulmonary arteries in the lung bases are limited secondary to motion artifact. No pulmonary embolism in the remaining visualized pulmonary arteries.   2. Partially loculated small to moderate left pleural effusion with compressive atelectasis of part of the left lower lobe.  3. Diastasis of the median sternotomy measuring up to 1.4 cm with intact sternotomy wires which could be postsurgical in nature versus sternal dehiscence. Correlation with operative report or prior CT chest examination would be useful to determine stability.    OBIE BAIRD M.D., RADIOLGY RESIDENT  This document has been electronically signed.  HUMA KANG M.D., ATTENDING RADIOLOGIST    < end of copied text >    ECHO:  < from: TTE with Doppler (w/Cont) (01.22.20 @ 10:25) >    Patient name: VIOLETTA RENTERIA  YOB: 1950   Age: 69 (M)   MR#: [id number]  Study Date: 1/22/2020  [facility] Tasia [facility] RDCS  Study quality: Technically difficult  Referring Physician: Sophy Enriquez MD  BloodPressure: 109/63 mmHg  Height: 168 cm  Weight: 82 kg  BSA: 1.9 m2  Heart Rate: 77 mmHg  ------------------------------------------------------------------------  PROCEDURE: Transthoracic echocardiogram with 2-D, M-Mode  and complete spectral and color flow Doppler.  INDICATION: Heart failure, unspecified (I50.9)  ------------------------------------------------------------------------  Dimensions:    Normal Values:  LA:            2.0 - 4.0 cm  Ao:            2.0 - 3.8 cm  SEPTUM:        0.6 - 1.2 cm  PWT:           0.6 - 1.1 cm  LVIDd:         3.0 - 5.6 cm  LVIDs:         1.8 - 4.0 cm  EF (Visual Estimate): 55 %  Doppler Peak Velocity (m/sec): AoV=1.0  ------------------------------------------------------------------------  Observations:  Mitral Valve: Mitral annular calcification and calcified  mitral leaflets. Mild mitral regurgitation.  Peak mitral  valve gradient equals 7 mm Hg, mean transmitral valve  gradient equals 2 mm Hg, consistent with mild mitral  stenosis. Gradients measured at a HR of 72 bpm.  Aortic Valve/Aorta: Calcified trileaflet aortic valve with  normal opening. Peak transaortic valve gradient equals 4 mm  Hg. Peak left ventricular outflow tract gradient equals 4  mm Hg.  Left Atrium: Normal left atrium.  LA volume index = 27  cc/m2.  Left Ventricle: Endocardial visualization enhanced with  intravenous injection of Ultrasonic Enhancing Agent  (Definity). No left ventricular thrombus. Normal left  ventricular systolic function. No segmental wall motion  abnormalities. Septal motion consistent with right  ventricular overload. Normal left ventricular internal  dimensions and wall thicknesses. Severe reversible  diastolic dysfunction (Stage III).  Right Heart: Moderate right atrial enlargement. Right  ventricular enlargement with decreased right ventricular  systolic function. Normal tricuspid valve. Minimal  tricuspid regurgitation. Normal pulmonic valve. Minimal  pulmonic regurgitation.  Pericardium/Pleura: Normal pericardium with no pericardial  effusion.  Hemodynamic: Color Doppler demonstrates no evidence of a  patent foramen ovale.  ------------------------------------------------------------------------  Conclusions:  1. Mitral annular calcification and calcified mitral  leaflets. Mild mitral regurgitation.  Peak mitral valve  gradient equals 7 mm Hg, mean transmitral valve gradient  equals 2 mm Hg, consistent with mild mitral stenosis.  Gradients measured at a HR of 72 bpm.  2. Endocardial visualization enhanced with intravenous  injection of Ultrasonic Enhancing Agent (Definity). No left  ventricular thrombus. Normal left ventricular systolic  function. No segmental wall motion abnormalities. Septal  motion consistent with right ventricular overload.  3. Severe reversible diastolic dysfunction (Stage III).  4. Moderate right atrial enlargement.  5. Right ventricular enlargement with decreased right  ventricular systolic function.  6. Normal pericardium with no pericardial effusion.  *** Compared with echocardiogram of 9/26/2019, the right  ventricle is now enlarged with decreased function.  Consider further workup including and evaluation for  pulmonary disease.  ------------------------------------------------------------------------  Confirmed on  1/22/2020 - 12:54:22 by Warren Chung M.D.  ------------------------------------------------------------------------    < end of copied text >

## 2020-01-22 NOTE — PROGRESS NOTE ADULT - PROBLEM SELECTOR PLAN 9
Patient on lantus 14 and humalog 5 tid - will continue while admitted  low insulin sliding scale   monitor FS ac and hs

## 2020-01-22 NOTE — PROGRESS NOTE ADULT - PROBLEM SELECTOR PLAN 3
TTE in 2019 showed normal LV function, mild diastolic dysfunction; pt with worsening SOB and pedal edema and orthopnea   F/Up TTE  no role for diuresis given anuria   fluid removal per HD  monitor daily weights   - Continue home BB.

## 2020-01-22 NOTE — PROGRESS NOTE ADULT - PROBLEM SELECTOR PLAN 8
currently remain in Afib, rate controlled   c/w metoprolol and amiodarone   INR subtherapeutic 1.58/ coumadin 7mg oral tonight   As per wife , pt was off of coumadin for 3 days and now subtherapeutic with c/o ? Epistasis / Case was discussed with the Cardiologist who recommended Heparin bridge as pt has subtherapeutic INR with high CHADS VAsc and h/o CVA but due to co ? Epistasis will cont coumadin until seen by ENT which was called

## 2020-01-22 NOTE — PROGRESS NOTE ADULT - PROBLEM SELECTOR PLAN 1
plan for extra HD session today to help fluid removal  will attempt for 2 to 2.5kg as tolerated  trend bmp  hgb at goal- trend hgb

## 2020-01-23 DIAGNOSIS — I50.33 ACUTE ON CHRONIC DIASTOLIC (CONGESTIVE) HEART FAILURE: ICD-10-CM

## 2020-01-23 LAB
ANION GAP SERPL CALC-SCNC: 15 MMOL/L — SIGNIFICANT CHANGE UP (ref 5–17)
APTT BLD: 31.5 SEC — SIGNIFICANT CHANGE UP (ref 27.5–36.3)
BUN SERPL-MCNC: 33 MG/DL — HIGH (ref 7–23)
CALCIUM SERPL-MCNC: 8.7 MG/DL — SIGNIFICANT CHANGE UP (ref 8.4–10.5)
CHLORIDE SERPL-SCNC: 92 MMOL/L — LOW (ref 96–108)
CO2 SERPL-SCNC: 27 MMOL/L — SIGNIFICANT CHANGE UP (ref 22–31)
CREAT SERPL-MCNC: 5.66 MG/DL — HIGH (ref 0.5–1.3)
GLUCOSE BLDC GLUCOMTR-MCNC: 153 MG/DL — HIGH (ref 70–99)
GLUCOSE BLDC GLUCOMTR-MCNC: 171 MG/DL — HIGH (ref 70–99)
GLUCOSE BLDC GLUCOMTR-MCNC: 74 MG/DL — SIGNIFICANT CHANGE UP (ref 70–99)
GLUCOSE BLDC GLUCOMTR-MCNC: 89 MG/DL — SIGNIFICANT CHANGE UP (ref 70–99)
GLUCOSE SERPL-MCNC: 227 MG/DL — HIGH (ref 70–99)
HCT VFR BLD CALC: 44.3 % — SIGNIFICANT CHANGE UP (ref 39–50)
HGB BLD-MCNC: 13 G/DL — SIGNIFICANT CHANGE UP (ref 13–17)
INR BLD: 1.44 RATIO — HIGH (ref 0.88–1.16)
MCHC RBC-ENTMCNC: 26.9 PG — LOW (ref 27–34)
MCHC RBC-ENTMCNC: 29.3 GM/DL — LOW (ref 32–36)
MCV RBC AUTO: 91.7 FL — SIGNIFICANT CHANGE UP (ref 80–100)
NRBC # BLD: 0 /100 WBCS — SIGNIFICANT CHANGE UP (ref 0–0)
PLATELET # BLD AUTO: 118 K/UL — LOW (ref 150–400)
POTASSIUM SERPL-MCNC: 4.4 MMOL/L — SIGNIFICANT CHANGE UP (ref 3.5–5.3)
POTASSIUM SERPL-SCNC: 4.4 MMOL/L — SIGNIFICANT CHANGE UP (ref 3.5–5.3)
PROTHROM AB SERPL-ACNC: 16.8 SEC — HIGH (ref 10–12.9)
RBC # BLD: 4.83 M/UL — SIGNIFICANT CHANGE UP (ref 4.2–5.8)
RBC # FLD: 15.9 % — HIGH (ref 10.3–14.5)
SODIUM SERPL-SCNC: 134 MMOL/L — LOW (ref 135–145)
WBC # BLD: 8.47 K/UL — SIGNIFICANT CHANGE UP (ref 3.8–10.5)
WBC # FLD AUTO: 8.47 K/UL — SIGNIFICANT CHANGE UP (ref 3.8–10.5)

## 2020-01-23 PROCEDURE — 99233 SBSQ HOSP IP/OBS HIGH 50: CPT

## 2020-01-23 RX ORDER — WARFARIN SODIUM 2.5 MG/1
5 TABLET ORAL ONCE
Refills: 0 | Status: COMPLETED | OUTPATIENT
Start: 2020-01-23 | End: 2020-01-23

## 2020-01-23 RX ORDER — INSULIN GLARGINE 100 [IU]/ML
8 INJECTION, SOLUTION SUBCUTANEOUS AT BEDTIME
Refills: 0 | Status: DISCONTINUED | OUTPATIENT
Start: 2020-01-23 | End: 2020-01-28

## 2020-01-23 RX ORDER — HEPARIN SODIUM 5000 [USP'U]/ML
INJECTION INTRAVENOUS; SUBCUTANEOUS
Qty: 25000 | Refills: 0 | Status: DISCONTINUED | OUTPATIENT
Start: 2020-01-23 | End: 2020-01-24

## 2020-01-23 RX ORDER — HEPARIN SODIUM 5000 [USP'U]/ML
7000 INJECTION INTRAVENOUS; SUBCUTANEOUS EVERY 6 HOURS
Refills: 0 | Status: DISCONTINUED | OUTPATIENT
Start: 2020-01-23 | End: 2020-01-24

## 2020-01-23 RX ORDER — HEPARIN SODIUM 5000 [USP'U]/ML
3500 INJECTION INTRAVENOUS; SUBCUTANEOUS EVERY 6 HOURS
Refills: 0 | Status: DISCONTINUED | OUTPATIENT
Start: 2020-01-23 | End: 2020-01-24

## 2020-01-23 RX ADMIN — AMIODARONE HYDROCHLORIDE 200 MILLIGRAM(S): 400 TABLET ORAL at 05:02

## 2020-01-23 RX ADMIN — KETOTIFEN FUMARATE 1 DROP(S): 0.34 SOLUTION OPHTHALMIC at 11:39

## 2020-01-23 RX ADMIN — Medication 3 MILLILITER(S): at 05:03

## 2020-01-23 RX ADMIN — Medication 667 MILLIGRAM(S): at 08:06

## 2020-01-23 RX ADMIN — Medication 3 MILLILITER(S): at 11:39

## 2020-01-23 RX ADMIN — Medication 3 MILLILITER(S): at 22:47

## 2020-01-23 RX ADMIN — SIMVASTATIN 40 MILLIGRAM(S): 20 TABLET, FILM COATED ORAL at 22:46

## 2020-01-23 RX ADMIN — Medication 2: at 08:06

## 2020-01-23 RX ADMIN — Medication 1 SPRAY(S): at 05:03

## 2020-01-23 RX ADMIN — Medication 667 MILLIGRAM(S): at 17:51

## 2020-01-23 RX ADMIN — PANTOPRAZOLE SODIUM 40 MILLIGRAM(S): 20 TABLET, DELAYED RELEASE ORAL at 05:02

## 2020-01-23 RX ADMIN — HEPARIN SODIUM 1600 UNIT(S)/HR: 5000 INJECTION INTRAVENOUS; SUBCUTANEOUS at 17:53

## 2020-01-23 RX ADMIN — Medication 1 SPRAY(S): at 17:52

## 2020-01-23 RX ADMIN — Medication 100 MILLIGRAM(S): at 08:35

## 2020-01-23 RX ADMIN — Medication 81 MILLIGRAM(S): at 11:39

## 2020-01-23 RX ADMIN — Medication 3 MILLILITER(S): at 17:52

## 2020-01-23 RX ADMIN — INSULIN GLARGINE 8 UNIT(S): 100 INJECTION, SOLUTION SUBCUTANEOUS at 22:46

## 2020-01-23 RX ADMIN — WARFARIN SODIUM 5 MILLIGRAM(S): 2.5 TABLET ORAL at 22:46

## 2020-01-23 RX ADMIN — Medication 667 MILLIGRAM(S): at 11:39

## 2020-01-23 RX ADMIN — Medication 1 SPRAY(S): at 05:02

## 2020-01-23 RX ADMIN — Medication 5 UNIT(S): at 11:39

## 2020-01-23 RX ADMIN — Medication 5 UNIT(S): at 08:06

## 2020-01-23 RX ADMIN — Medication 5 UNIT(S): at 17:59

## 2020-01-23 NOTE — PROGRESS NOTE ADULT - PROBLEM SELECTOR PLAN 1
s/p extra HD session yesterday- tolerated well  plan for routine hd today  trend bmp  anemia in ckd- hgb above goal- trend hgb

## 2020-01-23 NOTE — CHART NOTE - NSCHARTNOTEFT_GEN_A_CORE
70 y/o male s/p 9/22/19 CABGx3 readmit with SOB and cough. Requested by primary team for consult on small to mod left pleural effusion on CT Chest and new RV dysfunction on TTE.       < from: CT Angio Chest w/ IV Cont (01.20.20 @ 19:09) >  IMPRESSION:   1. Evaluation of the subsegmental pulmonary arteries in the lung bases are limited secondary to motion artifact. No pulmonary embolism in the remaining visualized pulmonary arteries.   2. Partially loculated small to moderate left pleural effusion with compressive atelectasis of part of the left lower lobe.  3. Diastasis of the median sternotomy measuring up to 1.4 cm with intact sternotomy wires which could be postsurgical in nature versus sternal dehiscence. Correlation with operative report or prior CT chest examination would be useful to determine stability.    < from: TTE with Doppler (w/Cont) (01.22.20 @ 10:25) >  Conclusions:  1. Mitral annular calcification and calcified mitral  leaflets. Mild mitral regurgitation.  Peak mitral valve  gradient equals 7 mm Hg, mean transmitral valve gradient  equals 2 mm Hg, consistent with mild mitral stenosis.  Gradients measured at a HR of 72 bpm.  2. Endocardial visualization enhanced with intravenous  injection of Ultrasonic Enhancing Agent (Definity). No left  ventricular thrombus. Normal left ventricular systolic  function. No segmental wall motion abnormalities. Septal  motion consistent with right ventricular overload.  3. Severe reversible diastolic dysfunction (Stage III).  4. Moderate right atrial enlargement.  5. Right ventricular enlargement with decreased right  ventricular systolic function.  6. Normal pericardium with no pericardial effusion.  *** Compared with echocardiogram of 9/26/2019, the right  ventricle is now enlarged with decreased function.  Consider further workup including and evaluation for  pulmonary disease.      1/23  Findings discussed with Dr. Vasquez, appears to have small loculated left pleural effusion on CT scan, CT reviewed by Dr. Vasquez, no CT Surgery intervention warranted at this time. Continue medical management as per primary team, continue diureses, recommend consulting heart failure for RV dysfunction if indicated.    CT surgery [id number] 68 y/o male s/p 9/22/19 CABGx3 readmit with SOB and cough. Requested by primary team for consult on small to mod left pleural effusion on CT Chest and new RV dysfunction on TTE.       < from: CT Angio Chest w/ IV Cont (01.20.20 @ 19:09) >  IMPRESSION:   1. Evaluation of the subsegmental pulmonary arteries in the lung bases are limited secondary to motion artifact. No pulmonary embolism in the remaining visualized pulmonary arteries.   2. Partially loculated small to moderate left pleural effusion with compressive atelectasis of part of the left lower lobe.  3. Diastasis of the median sternotomy measuring up to 1.4 cm with intact sternotomy wires which could be postsurgical in nature versus sternal dehiscence. Correlation with operative report or prior CT chest examination would be useful to determine stability.    < from: TTE with Doppler (w/Cont) (01.22.20 @ 10:25) >  Conclusions:  1. Mitral annular calcification and calcified mitral  leaflets. Mild mitral regurgitation.  Peak mitral valve  gradient equals 7 mm Hg, mean transmitral valve gradient  equals 2 mm Hg, consistent with mild mitral stenosis.  Gradients measured at a HR of 72 bpm.  2. Endocardial visualization enhanced with intravenous  injection of Ultrasonic Enhancing Agent (Definity). No left  ventricular thrombus. Normal left ventricular systolic  function. No segmental wall motion abnormalities. Septal  motion consistent with right ventricular overload.  3. Severe reversible diastolic dysfunction (Stage III).  4. Moderate right atrial enlargement.  5. Right ventricular enlargement with decreased right  ventricular systolic function.  6. Normal pericardium with no pericardial effusion.  *** Compared with echocardiogram of 9/26/2019, the right  ventricle is now enlarged with decreased function.  Consider further workup including and evaluation for  pulmonary disease.      1/23  Findings discussed with Dr. Vasquez, appears to have small loculated left pleural effusion on CT scan, CT reviewed by Dr. Vasquez, no CT Surgery intervention warranted at this time. Continue medical management as per primary team, continue w/ pulm recs, recommend consulting heart failure for RV dysfunction if indicated.    CT surgery [id number] 68 y/o male s/p 9/22/19 CABGx3 readmit with SOB and cough. Requested by primary team for consult on small to mod left pleural effusion on CT Chest and new RV dysfunction on TTE.       < from: CT Angio Chest w/ IV Cont (01.20.20 @ 19:09) >  IMPRESSION:   1. Evaluation of the subsegmental pulmonary arteries in the lung bases are limited secondary to motion artifact. No pulmonary embolism in the remaining visualized pulmonary arteries.   2. Partially loculated small to moderate left pleural effusion with compressive atelectasis of part of the left lower lobe.  3. Diastasis of the median sternotomy measuring up to 1.4 cm with intact sternotomy wires which could be postsurgical in nature versus sternal dehiscence. Correlation with operative report or prior CT chest examination would be useful to determine stability.    < from: TTE with Doppler (w/Cont) (01.22.20 @ 10:25) >  Conclusions:  1. Mitral annular calcification and calcified mitral  leaflets. Mild mitral regurgitation.  Peak mitral valve  gradient equals 7 mm Hg, mean transmitral valve gradient  equals 2 mm Hg, consistent with mild mitral stenosis.  Gradients measured at a HR of 72 bpm.  2. Endocardial visualization enhanced with intravenous  injection of Ultrasonic Enhancing Agent (Definity). No left  ventricular thrombus. Normal left ventricular systolic  function. No segmental wall motion abnormalities. Septal  motion consistent with right ventricular overload.  3. Severe reversible diastolic dysfunction (Stage III).  4. Moderate right atrial enlargement.  5. Right ventricular enlargement with decreased right  ventricular systolic function.  6. Normal pericardium with no pericardial effusion.  *** Compared with echocardiogram of 9/26/2019, the right  ventricle is now enlarged with decreased function.  Consider further workup including and evaluation for  pulmonary disease.      1/23  Findings discussed with Dr. Vasquez, appears to have small loculated left pleural effusion on CT scan, CT reviewed by Dr. Vasquez, no CT Surgery intervention warranted at this time. Continue medical management as per primary team, continue w/ pulm recs, continue fluid removal per HD, recommend consulting heart failure for RV dysfunction if indicated.    CT surgery [id number]  Discussed w/ Dr. Vasquez

## 2020-01-23 NOTE — PROGRESS NOTE ADULT - SUBJECTIVE AND OBJECTIVE BOX
[facility] DIVISION OF PULMONARY, CRITICAL CARE and SLEEP MEDICINE  PULMONARY PROGRESS NOTE  OFFICE NUMBER: [phone]    CHIEF COMPLAINT: Patient is a 69y old  Male who presents with a chief complaint of lethargy, coughing, SOB (22 Jan 2020 17:09)    [x] INITIAL CONSULT, H&P, FAMILY HISTORY and PAST MEDICAL AND SURGICAL HISTORY REVIEWED    OVERNIGHT EVENTS or CHANGES TO HPI: Feeling better overload. Less dyspneic. Nasal congestion also improved.    ========================REVIEW OF SYSTEMS========================  CONSTITUTIONAL: No fevers, chills  CARDIOVASCULAR: No chest pain  PULMONARY: Dyspnea improved. Cough, non-productive  [x] REMAINING REVIEW OF SYSTEMS NEGATIVE    ========================MEDICATIONS=============================  MEDICATIONS  (STANDING):  albuterol/ipratropium for Nebulization 3 milliLiter(s) Nebulizer every 6 hours  aMIOdarone    Tablet 200 milliGRAM(s) Oral daily  aspirin enteric coated 81 milliGRAM(s) Oral daily  calcium acetate 667 milliGRAM(s) Oral three times a day with meals  dextrose 5%. 1000 milliLiter(s) (50 mL/Hr) IV Continuous <Continuous>  dextrose 50% Injectable 12.5 Gram(s) IV Push once  dextrose 50% Injectable 25 Gram(s) IV Push once  dextrose 50% Injectable 25 Gram(s) IV Push once  fluticasone propionate 50 MICROgram(s)/spray Nasal Spray 1 Spray(s) Both Nostrils two times a day  insulin glargine Injectable (LANTUS) 14 Unit(s) SubCutaneous at bedtime  insulin lispro (HumaLOG) corrective regimen sliding scale   SubCutaneous three times a day before meals  insulin lispro (HumaLOG) corrective regimen sliding scale   SubCutaneous at bedtime  insulin lispro Injectable (HumaLOG) 5 Unit(s) SubCutaneous three times a day before meals  ketotifen 0.025% Ophthalmic Solution 1 Drop(s) Both EYES daily  methimazole 5 milliGRAM(s) Oral daily  metoprolol succinate  milliGRAM(s) Oral daily  pantoprazole    Tablet 40 milliGRAM(s) Oral before breakfast  simvastatin 40 milliGRAM(s) Oral at bedtime  sodium chloride 0.65% Nasal 1 Spray(s) Both Nostrils two times a day      MEDICATIONS  (PRN):  acetaminophen   Tablet .. 650 milliGRAM(s) Oral every 6 hours PRN Temp greater or equal to 38C (100.4F), Mild Pain (1 - 3)  dextrose 40% Gel 15 Gram(s) Oral once PRN Blood Glucose LESS THAN 70 milliGRAM(s)/deciliter  glucagon  Injectable 1 milliGRAM(s) IntraMuscular once PRN Glucose LESS THAN 70 milligrams/deciliter  guaiFENesin   Syrup  (Sugar-Free) 100 milliGRAM(s) Oral every 6 hours PRN Cough      ========================PHYSICAL EXAM============================    VITALS: ICU Vital Signs Last 24 Hrs  T(C): 36.9 (23 Jan 2020 08:06), Max: 37.3 (23 Jan 2020 00:14)  T(F): 98.5 (23 Jan 2020 08:06), Max: 99.2 (23 Jan 2020 04:38)  HR: 82 (23 Jan 2020 08:06) (75 - 99)  BP: 138/73 (23 Jan 2020 08:06) (104/58 - 138/73)  RR: 20 (23 Jan 2020 08:06) (18 - 20)  SpO2: 95% (23 Jan 2020 08:06) (94% - 99%)      INTAKE and OUTPUT: I&O's Summary    22 Jan 2020 07:01  -  23 Jan 2020 07:00  --------------------------------------------------------  IN: 0 mL / OUT: 2500 mL / NET: -2500 mL    23 Jan 2020 07:01  -  23 Jan 2020 11:03  --------------------------------------------------------  IN: 240 mL / OUT: 0 mL / NET: 240 mL      GENERAL: NCAT  HEENT: PERRL  NECK: Supple  CARDIOVASCULAR: RRR. S1. S2 +  PULMONARY: Clear to ausculation. No wheezing.  ABDOMEN: Soft, NTND  SKIN: Warm, intact  EXTREMITIES: 1 + pitting edema of LE extremities  NEUROLOGIC: AAO x3.   PSYCHIATRIC: Normal affect and mood      ========================LABORATORY RESULTS AND IMAGING=============                        13.0   8.47  )-----------( 118      ( 23 Jan 2020 06:06 )             44.3                                                    01-23    134<L>  |  92<L>  |  33<H>  ----------------------------<  227<H>  4.4   |  27  |  5.66<H>    Ca    8.7      23 Jan 2020 06:06  Phos  5.3     01-22            Creatinine Trend: 5.66<--, 6.74<--, 9.07<--, 8.36<--

## 2020-01-23 NOTE — PROGRESS NOTE ADULT - PROBLEM SELECTOR PLAN 2
TTE - Mild MR, no LV thrombus, normal left ventricular systolic function, wall motion abnormalities, right ventricular overload, severe reversible diastolic dysfunction (Stage III), Moderate right atrial enlargement, Right ventricular enlargement with decreased right ventricular systolic function.  Pulm and cardiology evaluations appreciated. Per pulm, he may require RHC for further work up. Per cardiology, plan for outpatient TTE in near future once euvolemic. If right heart cath is consistent with lung disease, will need to send rheumatologic work up as per the pulm note (hold off for now).  CTA no PE.

## 2020-01-23 NOTE — PROGRESS NOTE ADULT - PROVIDER SPECIALTY LIST ADULT
Hospitalist [facility] Pregnancy And Lactation Text: This medication has not been assigned a Pregnancy Risk Category. It is unknown if the medication is excreted in breast milk.

## 2020-01-23 NOTE — PROGRESS NOTE ADULT - PROBLEM SELECTOR PLAN 3
CT with small-mod left lower pleural effusion.  Pulm and Cardiothoracic surgery eval appreciated - no planned intervention at this point. The effusion is loculated, and would be challenging to target per CT surgery.

## 2020-01-23 NOTE — PROGRESS NOTE ADULT - PROBLEM SELECTOR PLAN 7
Noted HS-trop elevation to 132, but now downtrending. Likely demand ischemic in setting of ESRD, diastolic CHF. CKMB was wnl. No ischemic changes on EKG. Old RBBB present.  - c/w ASA and statin

## 2020-01-23 NOTE — PROGRESS NOTE ADULT - SUBJECTIVE AND OBJECTIVE BOX
[facility] Division of Hospital Medicine  Thaddeus Zelaya MD, LAY  Pager (M-F, 8A-5P): [phone]  Other Times:  [phone]    Patient is a 69y old  Male who presents with a chief complaint of lethargy, coughing, SOB (23 Jan 2020 17:21)      SUBJECTIVE / OVERNIGHT EVENTS:  No events overnight. The patient states that he's feeling better s/p dialysis.     MEDICATIONS  (STANDING):  albuterol/ipratropium for Nebulization 3 milliLiter(s) Nebulizer every 6 hours  aMIOdarone    Tablet 200 milliGRAM(s) Oral daily  aspirin enteric coated 81 milliGRAM(s) Oral daily  calcium acetate 667 milliGRAM(s) Oral three times a day with meals  fluticasone propionate 50 MICROgram(s)/spray Nasal Spray 1 Spray(s) Both Nostrils two times a day  heparin  Infusion.  Unit(s)/Hr (16 mL/Hr) IV Continuous <Continuous>  insulin glargine Injectable (LANTUS) 14 Unit(s) SubCutaneous at bedtime  insulin lispro (HumaLOG) corrective regimen sliding scale   SubCutaneous three times a day before meals  insulin lispro (HumaLOG) corrective regimen sliding scale   SubCutaneous at bedtime  insulin lispro Injectable (HumaLOG) 5 Unit(s) SubCutaneous three times a day before meals  ketotifen 0.025% Ophthalmic Solution 1 Drop(s) Both EYES daily  methimazole 5 milliGRAM(s) Oral daily  metoprolol succinate  milliGRAM(s) Oral daily  pantoprazole    Tablet 40 milliGRAM(s) Oral before breakfast  simvastatin 40 milliGRAM(s) Oral at bedtime  sodium chloride 0.65% Nasal 1 Spray(s) Both Nostrils two times a day    MEDICATIONS  (PRN):  acetaminophen   Tablet .. 650 milliGRAM(s) Oral every 6 hours PRN Temp greater or equal to 38C (100.4F), Mild Pain (1 - 3)  dextrose 40% Gel 15 Gram(s) Oral once PRN Blood Glucose LESS THAN 70 milliGRAM(s)/deciliter  glucagon  Injectable 1 milliGRAM(s) IntraMuscular once PRN Glucose LESS THAN 70 milligrams/deciliter  guaiFENesin   Syrup  (Sugar-Free) 100 milliGRAM(s) Oral every 6 hours PRN Cough  heparin  Injectable 7000 Unit(s) IV Push every 6 hours PRN For aPTT less than 40  heparin  Injectable 3500 Unit(s) IV Push every 6 hours PRN For aPTT between 40 - 57    CAPILLARY BLOOD GLUCOSE  POCT Blood Glucose.: 74 mg/dL (23 Jan 2020 17:56)  POCT Blood Glucose.: 89 mg/dL (23 Jan 2020 11:35)  POCT Blood Glucose.: 171 mg/dL (23 Jan 2020 08:03)  POCT Blood Glucose.: 135 mg/dL (22 Jan 2020 21:59)    I&O's Summary    22 Jan 2020 07:01  -  23 Jan 2020 07:00  --------------------------------------------------------  IN: 0 mL / OUT: 2500 mL / NET: -2500 mL    23 Jan 2020 07:01  -  23 Jan 2020 21:33  --------------------------------------------------------  IN: 600 mL / OUT: 2500 mL / NET: -1900 mL        PHYSICAL EXAM:  Vital Signs Last 24 Hrs  T(C): 37.7 (23 Jan 2020 20:48), Max: 37.7 (23 Jan 2020 20:48)  T(F): 99.8 (23 Jan 2020 20:48), Max: 99.8 (23 Jan 2020 20:48)  HR: 89 (23 Jan 2020 20:48) (70 - 89)  BP: 106/53 (23 Jan 2020 20:48) (104/58 - 138/73)  BP(mean): --  RR: 18 (23 Jan 2020 20:48) (17 - 20)  SpO2: 97% (23 Jan 2020 20:48) (94% - 97%)    GENERAL: NAD, well-developed  HEAD:  Atraumatic, Normocephalic  EYES:  conjunctiva and sclera clear  NECK: Supple, No JVD  CHEST/LUNG: Clear to auscultation bilaterally; No wheeze  HEART: Regular rate and rhythm; No murmurs, rubs, or gallops  ABDOMEN: Soft, Nontender, Nondistended; Bowel sounds present  EXTREMITIES:  No clubbing, cyanosis, or edema  PSYCH: AAOx3      LABS:                        13.0   8.47  )-----------( 118      ( 23 Jan 2020 06:06 )             44.3     01-23    134<L>  |  92<L>  |  33<H>  ----------------------------<  227<H>  4.4   |  27  |  5.66<H>    Ca    8.7      23 Jan 2020 06:06  Phos  5.3     01-22      PT/INR - ( 23 Jan 2020 06:06 )   PT: 16.8 sec;   INR: 1.44 ratio    PTT - ( 23 Jan 2020 13:29 )  PTT:31.5 sec      RADIOLOGY & ADDITIONAL TESTS:    Lab Results Reviewed: no leukocytosis, pseudohyponatremia       COORDINATION OF CARE:  Care Discussed with Consultants/Other Providers:  CT surgery re: CT chest findings of sternal dissidence - no intervention planned per Dr. Vasquez at this time. Monitor outpatient

## 2020-01-23 NOTE — PROGRESS NOTE ADULT - SUBJECTIVE AND OBJECTIVE BOX
Patient seen and examined  no complaints      lisinopril (Other)    Hospital Medications:   MEDICATIONS  (STANDING):  albuterol/ipratropium for Nebulization 3 milliLiter(s) Nebulizer every 6 hours  aMIOdarone    Tablet 200 milliGRAM(s) Oral daily  aspirin enteric coated 81 milliGRAM(s) Oral daily  calcium acetate 667 milliGRAM(s) Oral three times a day with meals  dextrose 5%. 1000 milliLiter(s) (50 mL/Hr) IV Continuous <Continuous>  dextrose 50% Injectable 12.5 Gram(s) IV Push once  dextrose 50% Injectable 25 Gram(s) IV Push once  dextrose 50% Injectable 25 Gram(s) IV Push once  fluticasone propionate 50 MICROgram(s)/spray Nasal Spray 1 Spray(s) Both Nostrils two times a day  heparin  Infusion.  Unit(s)/Hr (16 mL/Hr) IV Continuous <Continuous>  insulin glargine Injectable (LANTUS) 14 Unit(s) SubCutaneous at bedtime  insulin lispro (HumaLOG) corrective regimen sliding scale   SubCutaneous three times a day before meals  insulin lispro (HumaLOG) corrective regimen sliding scale   SubCutaneous at bedtime  insulin lispro Injectable (HumaLOG) 5 Unit(s) SubCutaneous three times a day before meals  ketotifen 0.025% Ophthalmic Solution 1 Drop(s) Both EYES daily  methimazole 5 milliGRAM(s) Oral daily  metoprolol succinate  milliGRAM(s) Oral daily  pantoprazole    Tablet 40 milliGRAM(s) Oral before breakfast  simvastatin 40 milliGRAM(s) Oral at bedtime  sodium chloride 0.65% Nasal 1 Spray(s) Both Nostrils two times a day        VITALS:  T(F): 98.3 (01-23-20 @ 13:05), Max: 99.2 (01-23-20 @ 04:38)  HR: 86 (01-23-20 @ 13:05)  BP: 114/71 (01-23-20 @ 13:05)  RR: 17 (01-23-20 @ 13:05)  SpO2: 96% (01-23-20 @ 13:05)  Wt(kg): --    01-22 @ 07:01  -  01-23 @ 07:00  --------------------------------------------------------  IN: 0 mL / OUT: 2500 mL / NET: -2500 mL    01-23 @ 07:01  -  01-23 @ 14:32  --------------------------------------------------------  IN: 480 mL / OUT: 0 mL / NET: 480 mL      PHYSICAL EXAM:  Constitutional: NAD  HEENT: anicteric sclera, oropharynx clear, MMM  Neck: No JVD  Respiratory: CTAB, no wheezes, rales or rhonchi  Cardiovascular: S1, S2, RRR  Gastrointestinal: BS+, soft, NT/ND  Extremities: ++ peripheral edema  Neurological: A/O x 3, no focal deficits  Psychiatric: Normal mood, normal affect  : No CVA tenderness. No wagner.   Skin: No rashes  Vascular Access: LUE AV access +thrill and bruit.     LABS:  01-23    134<L>  |  92<L>  |  33<H>  ----------------------------<  227<H>  4.4   |  27  |  5.66<H>    Ca    8.7      23 Jan 2020 06:06  Phos  5.3     01-22      Creatinine Trend: 5.66 <--, 6.74 <--, 9.07 <--, 8.36 <--                        13.0   8.47  )-----------( 118      ( 23 Jan 2020 06:06 )             44.3     Urine Studies:      RADIOLOGY & ADDITIONAL STUDIES:

## 2020-01-23 NOTE — PROGRESS NOTE ADULT - PROBLEM SELECTOR PLAN 8
currently remain in Afib, rate controlled   c/w metoprolol and amiodarone   INR subtherapeutic  coumadin 5mg oral tonight   As per wife, pt was off of coumadin for 3 days and now subtherapeutic with c/o ? Epistasis / Case was discussed with the Cardiologist who recommended Heparin bridge as pt has subtherapeutic INR with high CHADS VAsc and h/o CVA

## 2020-01-23 NOTE — PROGRESS NOTE ADULT - PROBLEM SELECTOR PLAN 4
Pt with syncopal episode one week prior to admission as he states as reported, wife who witnessed fall states no head strike.  MRI brain consistent with chronic infarcts.   Monitor on tele

## 2020-01-23 NOTE — PROGRESS NOTE ADULT - PROBLEM SELECTOR PLAN 6
hypoxia and volume overload, possibly 2/2 ESRD   no electrolyte abnormalities  HD on 1/21, 1/22, 1/23

## 2020-01-23 NOTE — PROGRESS NOTE ADULT - PROBLEM SELECTOR PLAN 1
Likely multifactorial from parainfluenza, pleural effusion and from volume overload  from new decompensated acute diastolic CHF (BNP 36,000 and CAD), likely due to ESRD related volume overload.   Acute respiratory failure with hypoxia (88% RA), not on home O2; now on 3LNC, saturating well, no respiratory distress / will titrate down as tolerated  Extra HD today per renal.

## 2020-01-24 LAB
ANION GAP SERPL CALC-SCNC: 15 MMOL/L — SIGNIFICANT CHANGE UP (ref 5–17)
APTT BLD: 89.8 SEC — HIGH (ref 27.5–36.3)
APTT BLD: >200 SEC — CRITICAL HIGH (ref 27.5–36.3)
BUN SERPL-MCNC: 32 MG/DL — HIGH (ref 7–23)
CALCIUM SERPL-MCNC: 9.1 MG/DL — SIGNIFICANT CHANGE UP (ref 8.4–10.5)
CHLORIDE SERPL-SCNC: 91 MMOL/L — LOW (ref 96–108)
CO2 SERPL-SCNC: 25 MMOL/L — SIGNIFICANT CHANGE UP (ref 22–31)
CREAT SERPL-MCNC: 4.92 MG/DL — HIGH (ref 0.5–1.3)
GLUCOSE BLDC GLUCOMTR-MCNC: 127 MG/DL — HIGH (ref 70–99)
GLUCOSE BLDC GLUCOMTR-MCNC: 130 MG/DL — HIGH (ref 70–99)
GLUCOSE BLDC GLUCOMTR-MCNC: 134 MG/DL — HIGH (ref 70–99)
GLUCOSE BLDC GLUCOMTR-MCNC: 146 MG/DL — HIGH (ref 70–99)
GLUCOSE SERPL-MCNC: 131 MG/DL — HIGH (ref 70–99)
HCT VFR BLD CALC: 43.9 % — SIGNIFICANT CHANGE UP (ref 39–50)
HCT VFR BLD CALC: 44.1 % — SIGNIFICANT CHANGE UP (ref 39–50)
HGB BLD-MCNC: 12.9 G/DL — LOW (ref 13–17)
HGB BLD-MCNC: 13.1 G/DL — SIGNIFICANT CHANGE UP (ref 13–17)
INR BLD: 2.56 RATIO — HIGH (ref 0.88–1.16)
MCHC RBC-ENTMCNC: 27.2 PG — SIGNIFICANT CHANGE UP (ref 27–34)
MCHC RBC-ENTMCNC: 27.6 PG — SIGNIFICANT CHANGE UP (ref 27–34)
MCHC RBC-ENTMCNC: 29.4 GM/DL — LOW (ref 32–36)
MCHC RBC-ENTMCNC: 29.7 GM/DL — LOW (ref 32–36)
MCV RBC AUTO: 92.4 FL — SIGNIFICANT CHANGE UP (ref 80–100)
MCV RBC AUTO: 92.8 FL — SIGNIFICANT CHANGE UP (ref 80–100)
NRBC # BLD: 0 /100 WBCS — SIGNIFICANT CHANGE UP (ref 0–0)
NRBC # BLD: 0 /100 WBCS — SIGNIFICANT CHANGE UP (ref 0–0)
PLATELET # BLD AUTO: 127 K/UL — LOW (ref 150–400)
PLATELET # BLD AUTO: 145 K/UL — LOW (ref 150–400)
POTASSIUM SERPL-MCNC: 4.3 MMOL/L — SIGNIFICANT CHANGE UP (ref 3.5–5.3)
POTASSIUM SERPL-SCNC: 4.3 MMOL/L — SIGNIFICANT CHANGE UP (ref 3.5–5.3)
PROTHROM AB SERPL-ACNC: 30.3 SEC — HIGH (ref 10–12.9)
RBC # BLD: 4.75 M/UL — SIGNIFICANT CHANGE UP (ref 4.2–5.8)
RBC # BLD: 4.75 M/UL — SIGNIFICANT CHANGE UP (ref 4.2–5.8)
RBC # FLD: 15.9 % — HIGH (ref 10.3–14.5)
RBC # FLD: 15.9 % — HIGH (ref 10.3–14.5)
SODIUM SERPL-SCNC: 131 MMOL/L — LOW (ref 135–145)
WBC # BLD: 8.56 K/UL — SIGNIFICANT CHANGE UP (ref 3.8–10.5)
WBC # BLD: 9.94 K/UL — SIGNIFICANT CHANGE UP (ref 3.8–10.5)
WBC # FLD AUTO: 8.56 K/UL — SIGNIFICANT CHANGE UP (ref 3.8–10.5)
WBC # FLD AUTO: 9.94 K/UL — SIGNIFICANT CHANGE UP (ref 3.8–10.5)

## 2020-01-24 PROCEDURE — 99233 SBSQ HOSP IP/OBS HIGH 50: CPT

## 2020-01-24 RX ORDER — WARFARIN SODIUM 2.5 MG/1
3 TABLET ORAL ONCE
Refills: 0 | Status: DISCONTINUED | OUTPATIENT
Start: 2020-01-24 | End: 2020-01-24

## 2020-01-24 RX ORDER — TIOTROPIUM BROMIDE 18 UG/1
1 CAPSULE ORAL; RESPIRATORY (INHALATION) DAILY
Refills: 0 | Status: DISCONTINUED | OUTPATIENT
Start: 2020-01-24 | End: 2020-01-28

## 2020-01-24 RX ORDER — IPRATROPIUM/ALBUTEROL SULFATE 18-103MCG
3 AEROSOL WITH ADAPTER (GRAM) INHALATION EVERY 6 HOURS
Refills: 0 | Status: DISCONTINUED | OUTPATIENT
Start: 2020-01-24 | End: 2020-01-28

## 2020-01-24 RX ADMIN — Medication 5 UNIT(S): at 13:54

## 2020-01-24 RX ADMIN — Medication 667 MILLIGRAM(S): at 18:26

## 2020-01-24 RX ADMIN — KETOTIFEN FUMARATE 1 DROP(S): 0.34 SOLUTION OPHTHALMIC at 12:42

## 2020-01-24 RX ADMIN — Medication 3 MILLILITER(S): at 18:26

## 2020-01-24 RX ADMIN — Medication 667 MILLIGRAM(S): at 10:10

## 2020-01-24 RX ADMIN — Medication 667 MILLIGRAM(S): at 12:42

## 2020-01-24 RX ADMIN — HEPARIN SODIUM 1600 UNIT(S)/HR: 5000 INJECTION INTRAVENOUS; SUBCUTANEOUS at 01:30

## 2020-01-24 RX ADMIN — AMIODARONE HYDROCHLORIDE 200 MILLIGRAM(S): 400 TABLET ORAL at 06:23

## 2020-01-24 RX ADMIN — Medication 100 MILLIGRAM(S): at 06:23

## 2020-01-24 RX ADMIN — Medication 5 UNIT(S): at 20:00

## 2020-01-24 RX ADMIN — Medication 5 UNIT(S): at 10:10

## 2020-01-24 RX ADMIN — Medication 3 MILLILITER(S): at 06:23

## 2020-01-24 RX ADMIN — Medication 3 MILLILITER(S): at 12:42

## 2020-01-24 RX ADMIN — Medication 1 SPRAY(S): at 18:26

## 2020-01-24 RX ADMIN — PANTOPRAZOLE SODIUM 40 MILLIGRAM(S): 20 TABLET, DELAYED RELEASE ORAL at 06:23

## 2020-01-24 RX ADMIN — SIMVASTATIN 40 MILLIGRAM(S): 20 TABLET, FILM COATED ORAL at 21:58

## 2020-01-24 RX ADMIN — INSULIN GLARGINE 8 UNIT(S): 100 INJECTION, SOLUTION SUBCUTANEOUS at 21:58

## 2020-01-24 RX ADMIN — Medication 81 MILLIGRAM(S): at 12:42

## 2020-01-24 RX ADMIN — Medication 1 SPRAY(S): at 18:27

## 2020-01-24 RX ADMIN — Medication 1 SPRAY(S): at 06:23

## 2020-01-24 RX ADMIN — HEPARIN SODIUM 0 UNIT(S)/HR: 5000 INJECTION INTRAVENOUS; SUBCUTANEOUS at 09:24

## 2020-01-24 NOTE — PROGRESS NOTE ADULT - SUBJECTIVE AND OBJECTIVE BOX
[facility] DIVISION OF PULMONARY, CRITICAL CARE and SLEEP MEDICINE  PULMONARY PROGRESS NOTE  OFFICE NUMBER: [phone]    CHIEF COMPLAINT: Patient is a 69y old  Male who presents with a chief complaint of lethargy, coughing, SOB (22 Jan 2020 17:09)    [x] INITIAL CONSULT, H&P, FAMILY HISTORY and PAST MEDICAL AND SURGICAL HISTORY REVIEWED    OVERNIGHT EVENTS or CHANGES TO HPI: Continues to report feeling better overall. Sleeping better. Able to lie more flat.    ========================REVIEW OF SYSTEMS========================  CONSTITUTIONAL: No fevers, chills  CARDIOVASCULAR: No chest pain  PULMONARY: Dyspnea improved. Cough, non-productive  [x] REMAINING REVIEW OF SYSTEMS NEGATIVE    ========================MEDICATIONS=============================  MEDICATIONS  (STANDING):  albuterol/ipratropium for Nebulization 3 milliLiter(s) Nebulizer every 6 hours  aMIOdarone    Tablet 200 milliGRAM(s) Oral daily  aspirin enteric coated 81 milliGRAM(s) Oral daily  calcium acetate 667 milliGRAM(s) Oral three times a day with meals  dextrose 5%. 1000 milliLiter(s) (50 mL/Hr) IV Continuous <Continuous>  dextrose 50% Injectable 12.5 Gram(s) IV Push once  dextrose 50% Injectable 25 Gram(s) IV Push once  dextrose 50% Injectable 25 Gram(s) IV Push once  fluticasone propionate 50 MICROgram(s)/spray Nasal Spray 1 Spray(s) Both Nostrils two times a day  insulin glargine Injectable (LANTUS) 8 Unit(s) SubCutaneous at bedtime  insulin lispro (HumaLOG) corrective regimen sliding scale   SubCutaneous three times a day before meals  insulin lispro (HumaLOG) corrective regimen sliding scale   SubCutaneous at bedtime  insulin lispro Injectable (HumaLOG) 5 Unit(s) SubCutaneous three times a day before meals  ketotifen 0.025% Ophthalmic Solution 1 Drop(s) Both EYES daily  methimazole 5 milliGRAM(s) Oral daily  metoprolol succinate  milliGRAM(s) Oral daily  pantoprazole    Tablet 40 milliGRAM(s) Oral before breakfast  simvastatin 40 milliGRAM(s) Oral at bedtime  sodium chloride 0.65% Nasal 1 Spray(s) Both Nostrils two times a day    MEDICATIONS  (PRN):  acetaminophen   Tablet .. 650 milliGRAM(s) Oral every 6 hours PRN Temp greater or equal to 38C (100.4F), Mild Pain (1 - 3)  dextrose 40% Gel 15 Gram(s) Oral once PRN Blood Glucose LESS THAN 70 milliGRAM(s)/deciliter  glucagon  Injectable 1 milliGRAM(s) IntraMuscular once PRN Glucose LESS THAN 70 milligrams/deciliter  guaiFENesin   Syrup  (Sugar-Free) 100 milliGRAM(s) Oral every 6 hours PRN Cough        ========================PHYSICAL EXAM============================  ICU Vital Signs Last 24 Hrs  T(C): 36.9 (24 Jan 2020 12:28), Max: 37.7 (23 Jan 2020 20:48)  T(F): 98.5 (24 Jan 2020 12:28), Max: 99.8 (23 Jan 2020 20:48)  HR: 81 (24 Jan 2020 12:28) (70 - 89)  BP: 122/60 (24 Jan 2020 12:28) (103/58 - 124/58)    RR: 18 (24 Jan 2020 12:28) (17 - 20)  SpO2: 100% (24 Jan 2020 12:28) (96% - 100%)      GENERAL: NCAT  HEENT: PERRL  NECK: Supple  CARDIOVASCULAR: RRR. S1. S2 +  PULMONARY: Clear to ausculation. No wheezing.  ABDOMEN: Soft, NTND  SKIN: Warm, intact  EXTREMITIES: trace edema of LE extremities, improved  NEUROLOGIC: AAO x3.   PSYCHIATRIC: Normal affect and mood      ========================LABORATORY RESULTS AND IMAGING=============                             13.1   9.94  )-----------( 145      ( 24 Jan 2020 08:08 )             44.1   01-24    131<L>  |  91<L>  |  32<H>  ----------------------------<  131<H>  4.3   |  25  |  4.92<H>    Ca    9.1      24 Jan 2020 07:18

## 2020-01-24 NOTE — PROGRESS NOTE ADULT - SUBJECTIVE AND OBJECTIVE BOX
Patient seen and examined  no complaints      lisinopril (Other)    Hospital Medications:   MEDICATIONS  (STANDING):  albuterol/ipratropium for Nebulization 3 milliLiter(s) Nebulizer every 6 hours  aMIOdarone    Tablet 200 milliGRAM(s) Oral daily  aspirin enteric coated 81 milliGRAM(s) Oral daily  calcium acetate 667 milliGRAM(s) Oral three times a day with meals  dextrose 5%. 1000 milliLiter(s) (50 mL/Hr) IV Continuous <Continuous>  dextrose 50% Injectable 12.5 Gram(s) IV Push once  dextrose 50% Injectable 25 Gram(s) IV Push once  dextrose 50% Injectable 25 Gram(s) IV Push once  fluticasone propionate 50 MICROgram(s)/spray Nasal Spray 1 Spray(s) Both Nostrils two times a day  insulin glargine Injectable (LANTUS) 8 Unit(s) SubCutaneous at bedtime  insulin lispro (HumaLOG) corrective regimen sliding scale   SubCutaneous three times a day before meals  insulin lispro (HumaLOG) corrective regimen sliding scale   SubCutaneous at bedtime  insulin lispro Injectable (HumaLOG) 5 Unit(s) SubCutaneous three times a day before meals  ketotifen 0.025% Ophthalmic Solution 1 Drop(s) Both EYES daily  methimazole 5 milliGRAM(s) Oral daily  metoprolol succinate  milliGRAM(s) Oral daily  pantoprazole    Tablet 40 milliGRAM(s) Oral before breakfast  simvastatin 40 milliGRAM(s) Oral at bedtime  sodium chloride 0.65% Nasal 1 Spray(s) Both Nostrils two times a day        VITALS:  T(F): 98.5 (01-24-20 @ 12:28), Max: 99.8 (01-23-20 @ 20:48)  HR: 81 (01-24-20 @ 12:28)  BP: 122/60 (01-24-20 @ 12:28)  RR: 18 (01-24-20 @ 12:28)  SpO2: 100% (01-24-20 @ 12:28)  Wt(kg): --    01-23 @ 07:01  -  01-24 @ 07:00  --------------------------------------------------------  IN: 720 mL / OUT: 2550 mL / NET: -1830 mL        PHYSICAL EXAM:  Constitutional: NAD  HEENT: anicteric sclera, oropharynx clear, MMM  Neck: No JVD  Respiratory: CTAB, no wheezes, rales or rhonchi  Cardiovascular: S1, S2, RRR  Gastrointestinal: BS+, soft, NT/ND  Extremities: ++ peripheral edema  Neurological: A/O x 3, no focal deficits  Psychiatric: Normal mood, normal affect  : No CVA tenderness. No wagner.   Skin: No rashes  Vascular Access: LUE AV access +thrill and bruit.     LABS:  01-24    131<L>  |  91<L>  |  32<H>  ----------------------------<  131<H>  4.3   |  25  |  4.92<H>    Ca    9.1      24 Jan 2020 07:18      Creatinine Trend: 4.92 <--, 5.66 <--, 6.74 <--, 9.07 <--, 8.36 <--                        13.1   9.94  )-----------( 145      ( 24 Jan 2020 08:08 )             44.1     Urine Studies:      RADIOLOGY & ADDITIONAL STUDIES:

## 2020-01-24 NOTE — PROVIDER CONTACT NOTE (CRITICAL VALUE NOTIFICATION) - ASSESSMENT
Pt has heprin drip running and has a aptt greater than 200. pt has no s/s of bleeding. no signs of acute distress noted.

## 2020-01-24 NOTE — PROVIDER CONTACT NOTE (CRITICAL VALUE NOTIFICATION) - SITUATION
pt admitted with pleural effusion and fluid overload. Pt has aflutter on tele. Pt has heprin drip running and has a aptt greater than 200. pt has no s/s of bleeding. no signs of acute distress noted.

## 2020-01-24 NOTE — PROGRESS NOTE ADULT - PROBLEM SELECTOR PLAN 1
Likely multifactorial from parainfluenza, pleural effusion and from volume overload  from new decompensated acute diastolic CHF (BNP 36,000 and CAD), likely due to ESRD related volume overload. Still has +JVD, s/p 3 sessions of HD over last 3 days.  Acute respiratory failure with hypoxia (88% RA), not on home O2; now on supplemental oxygen- titrate down as tolerated  HD per renal - still volume up on exam

## 2020-01-24 NOTE — PROGRESS NOTE ADULT - SUBJECTIVE AND OBJECTIVE BOX
S: Feels better. SOB/Cough improved. Denies chest pain. Review of systems otherwise (-)      MEDICATIONS  (STANDING):  albuterol/ipratropium for Nebulization 3 milliLiter(s) Nebulizer every 6 hours  aMIOdarone    Tablet 200 milliGRAM(s) Oral daily  aspirin enteric coated 81 milliGRAM(s) Oral daily  calcium acetate 667 milliGRAM(s) Oral three times a day with meals  dextrose 5%. 1000 milliLiter(s) (50 mL/Hr) IV Continuous <Continuous>  dextrose 50% Injectable 12.5 Gram(s) IV Push once  dextrose 50% Injectable 25 Gram(s) IV Push once  dextrose 50% Injectable 25 Gram(s) IV Push once  fluticasone propionate 50 MICROgram(s)/spray Nasal Spray 1 Spray(s) Both Nostrils two times a day  insulin glargine Injectable (LANTUS) 8 Unit(s) SubCutaneous at bedtime  insulin lispro (HumaLOG) corrective regimen sliding scale   SubCutaneous three times a day before meals  insulin lispro (HumaLOG) corrective regimen sliding scale   SubCutaneous at bedtime  insulin lispro Injectable (HumaLOG) 5 Unit(s) SubCutaneous three times a day before meals  ketotifen 0.025% Ophthalmic Solution 1 Drop(s) Both EYES daily  methimazole 5 milliGRAM(s) Oral daily  metoprolol succinate  milliGRAM(s) Oral daily  pantoprazole    Tablet 40 milliGRAM(s) Oral before breakfast  simvastatin 40 milliGRAM(s) Oral at bedtime  sodium chloride 0.65% Nasal 1 Spray(s) Both Nostrils two times a day    MEDICATIONS  (PRN):  acetaminophen   Tablet .. 650 milliGRAM(s) Oral every 6 hours PRN Temp greater or equal to 38C (100.4F), Mild Pain (1 - 3)  dextrose 40% Gel 15 Gram(s) Oral once PRN Blood Glucose LESS THAN 70 milliGRAM(s)/deciliter  glucagon  Injectable 1 milliGRAM(s) IntraMuscular once PRN Glucose LESS THAN 70 milligrams/deciliter  guaiFENesin   Syrup  (Sugar-Free) 100 milliGRAM(s) Oral every 6 hours PRN Cough      LABS:                            13.1   9.94  )-----------( 145      ( 24 Jan 2020 08:08 )             44.1     Hemoglobin: 13.1 g/dL (01-24 @ 08:08)  Hemoglobin: 12.9 g/dL (01-24 @ 00:30)  Hemoglobin: 13.0 g/dL (01-23 @ 06:06)  Hemoglobin: 12.8 g/dL (01-22 @ 06:39)  Hemoglobin: 13.2 g/dL (01-21 @ 07:28)    01-24    131<L>  |  91<L>  |  32<H>  ----------------------------<  131<H>  4.3   |  25  |  4.92<H>    Ca    9.1      24 Jan 2020 07:18      Creatinine Trend: 4.92<--, 5.66<--, 6.74<--, 9.07<--, 8.36<--   PT/INR - ( 24 Jan 2020 08:08 )   PT: 30.3 sec;   INR: 2.56 ratio         PTT - ( 24 Jan 2020 08:08 )  PTT:>200.0 sec          01-23-20 @ 07:01  -  01-24-20 @ 07:00  --------------------------------------------------------  IN: 720 mL / OUT: 2550 mL / NET: -1830 mL        PHYSICAL EXAM  Vital Signs Last 24 Hrs  T(C): 36.9 (24 Jan 2020 12:28), Max: 37.7 (23 Jan 2020 20:48)  T(F): 98.5 (24 Jan 2020 12:28), Max: 99.8 (23 Jan 2020 20:48)  HR: 81 (24 Jan 2020 12:28) (70 - 89)  BP: 122/60 (24 Jan 2020 12:28) (103/58 - 124/58)  BP(mean): --  RR: 18 (24 Jan 2020 12:28) (18 - 20)  SpO2: 100% (24 Jan 2020 12:28) (96% - 100%)      Gen: Appears well in NAD  HEENT:  (-)icterus (-)pallor  CV: N S1 S2 1/6 DEJAH (+)2 Pulses B/l  Resp:  Clear to auscultation B/L, normal effort  GI: (+) BS Soft, NT, ND  Lymph:  (-)Edema, (-)obvious lymphadenopathy  Skin: Warm to touch, Normal turgor  Psych: Appropriate mood and affect        ASSESSMENT/PLAN: 70 y/o male well known to our office (Cardiologist - Dr. Méndez) with CAD s/p multiple stents and recent CABG in 9/2019 with course c/b Afib on coumadin, HTN, HLD, DM2, and ESRD on HD presents with lethargy, cough/SOB, LE edema and reported syncopal episode 1 week ago found to be volume overloaded with +parainfluenza and episode of syncope one week ago.     -Volume status and dyspnea improved  -TTE with normal LV function but with RVE with decreased RV function, Reviewed the echo, RV size is borderline enlarged but likely to volume status and loading conditions.  Doubt he truly has stage III diastolic dysfunction as his LA size is normal.  -Admission CT negative for PE - Pulm Eval noted  -ENT noted  -Fluid removal with HD per renal  -Recommend lifelong ac with coumadin if no contraindications  -CTS eval for appreciated  -No plans for RHC, Plan to repeat echo in office once euvolemic and  acute URI has resolved  -Patient to f/u with Dr. Méndez on Tuesday 2/11 at 10:30 AM    Wilfred Robin PA-C  [facility]  Pager: [phone] S: Feels better. SOB/Cough improved. Denies chest pain. Review of systems otherwise (-)      MEDICATIONS  (STANDING):  albuterol/ipratropium for Nebulization 3 milliLiter(s) Nebulizer every 6 hours  aMIOdarone    Tablet 200 milliGRAM(s) Oral daily  aspirin enteric coated 81 milliGRAM(s) Oral daily  calcium acetate 667 milliGRAM(s) Oral three times a day with meals  dextrose 5%. 1000 milliLiter(s) (50 mL/Hr) IV Continuous <Continuous>  dextrose 50% Injectable 12.5 Gram(s) IV Push once  dextrose 50% Injectable 25 Gram(s) IV Push once  dextrose 50% Injectable 25 Gram(s) IV Push once  fluticasone propionate 50 MICROgram(s)/spray Nasal Spray 1 Spray(s) Both Nostrils two times a day  insulin glargine Injectable (LANTUS) 8 Unit(s) SubCutaneous at bedtime  insulin lispro (HumaLOG) corrective regimen sliding scale   SubCutaneous three times a day before meals  insulin lispro (HumaLOG) corrective regimen sliding scale   SubCutaneous at bedtime  insulin lispro Injectable (HumaLOG) 5 Unit(s) SubCutaneous three times a day before meals  ketotifen 0.025% Ophthalmic Solution 1 Drop(s) Both EYES daily  methimazole 5 milliGRAM(s) Oral daily  metoprolol succinate  milliGRAM(s) Oral daily  pantoprazole    Tablet 40 milliGRAM(s) Oral before breakfast  simvastatin 40 milliGRAM(s) Oral at bedtime  sodium chloride 0.65% Nasal 1 Spray(s) Both Nostrils two times a day    MEDICATIONS  (PRN):  acetaminophen   Tablet .. 650 milliGRAM(s) Oral every 6 hours PRN Temp greater or equal to 38C (100.4F), Mild Pain (1 - 3)  dextrose 40% Gel 15 Gram(s) Oral once PRN Blood Glucose LESS THAN 70 milliGRAM(s)/deciliter  glucagon  Injectable 1 milliGRAM(s) IntraMuscular once PRN Glucose LESS THAN 70 milligrams/deciliter  guaiFENesin   Syrup  (Sugar-Free) 100 milliGRAM(s) Oral every 6 hours PRN Cough      LABS:                            13.1   9.94  )-----------( 145      ( 24 Jan 2020 08:08 )             44.1     Hemoglobin: 13.1 g/dL (01-24 @ 08:08)  Hemoglobin: 12.9 g/dL (01-24 @ 00:30)  Hemoglobin: 13.0 g/dL (01-23 @ 06:06)  Hemoglobin: 12.8 g/dL (01-22 @ 06:39)  Hemoglobin: 13.2 g/dL (01-21 @ 07:28)    01-24    131<L>  |  91<L>  |  32<H>  ----------------------------<  131<H>  4.3   |  25  |  4.92<H>    Ca    9.1      24 Jan 2020 07:18      Creatinine Trend: 4.92<--, 5.66<--, 6.74<--, 9.07<--, 8.36<--   PT/INR - ( 24 Jan 2020 08:08 )   PT: 30.3 sec;   INR: 2.56 ratio         PTT - ( 24 Jan 2020 08:08 )  PTT:>200.0 sec          01-23-20 @ 07:01  -  01-24-20 @ 07:00  --------------------------------------------------------  IN: 720 mL / OUT: 2550 mL / NET: -1830 mL        PHYSICAL EXAM  Vital Signs Last 24 Hrs  T(C): 36.9 (24 Jan 2020 12:28), Max: 37.7 (23 Jan 2020 20:48)  T(F): 98.5 (24 Jan 2020 12:28), Max: 99.8 (23 Jan 2020 20:48)  HR: 81 (24 Jan 2020 12:28) (70 - 89)  BP: 122/60 (24 Jan 2020 12:28) (103/58 - 124/58)  BP(mean): --  RR: 18 (24 Jan 2020 12:28) (18 - 20)  SpO2: 100% (24 Jan 2020 12:28) (96% - 100%)      Gen: Appears well in NAD  HEENT:  (-)icterus (-)pallor  CV: N S1 S2 1/6 DEJAH (+)2 Pulses B/l  Resp:  Clear to auscultation B/L, normal effort  GI: (+) BS Soft, NT, ND  Lymph:  (-)Edema, (-)obvious lymphadenopathy  Skin: Warm to touch, Normal turgor  Psych: Appropriate mood and affect    TELE: AF 60-80    ASSESSMENT/PLAN: 68 y/o male well known to our office (Cardiologist - Dr. Méndez) with CAD s/p multiple stents and recent CABG in 9/2019 with course c/b Afib on coumadin, HTN, HLD, DM2, and ESRD on HD presents with lethargy, cough/SOB, LE edema and reported syncopal episode 1 week ago found to be volume overloaded with +parainfluenza and episode of syncope one week ago.     -Volume status and dyspnea improved  -TTE with normal LV function but with RVE with decreased RV function, Reviewed the echo, RV size is borderline enlarged but likely to volume status and loading conditions.  Doubt he truly has stage III diastolic dysfunction as his LA size is normal.  -Admission CT negative for PE - Pulm Eval noted  -ENT noted  -Fluid removal with HD per renal  -Recommend lifelong ac with coumadin if no contraindications  -CTS eval for appreciated  -No plans for RHC, Plan to repeat echo in office once euvolemic and  acute URI has resolved  -Patient to f/u with Dr. Méndez on Tuesday 2/11 at 10:30 AM    Wilfred Robin PA-C  [facility]  Pager: [phone]

## 2020-01-24 NOTE — PROGRESS NOTE ADULT - PROBLEM SELECTOR PLAN 8
currently remain in Afib, rate controlled   c/w metoprolol and amiodarone   INR was subtherapeutic, now increased sharply- hold coumadin dose tonight. Check INR in AM  As per wife, pt was off of coumadin for 3 days prior to admission.

## 2020-01-24 NOTE — PROGRESS NOTE ADULT - ATTENDING COMMENTS
Agree with above  Recommend repeat TTE as outpatient when patient euvolemic  Lifelong ac if no contraindications    Kalie Lau MD

## 2020-01-24 NOTE — PROGRESS NOTE ADULT - PROBLEM SELECTOR PLAN 2
TTE - Mild MR, no LV thrombus, normal left ventricular systolic function, wall motion abnormalities, right ventricular overload, severe reversible diastolic dysfunction (Stage III), Moderate right atrial enlargement, Right ventricular enlargement with decreased right ventricular systolic function.  Pulm and cardiology evaluations appreciated. Per pulm, he may require RHC for further work up. Per cardiology, plan for outpatient TTE in near future once euvolemic. No plan for RHC at this time per cards. In future, if right heart cath is consistent with lung disease, will need to send rheumatologic work up as per the pulm note (hold off for now).  CTA no PE.

## 2020-01-25 LAB
ANION GAP SERPL CALC-SCNC: 20 MMOL/L — HIGH (ref 5–17)
APTT BLD: 30.5 SEC — SIGNIFICANT CHANGE UP (ref 27.5–36.3)
BUN SERPL-MCNC: 54 MG/DL — HIGH (ref 7–23)
CALCIUM SERPL-MCNC: 9 MG/DL — SIGNIFICANT CHANGE UP (ref 8.4–10.5)
CHLORIDE SERPL-SCNC: 89 MMOL/L — LOW (ref 96–108)
CO2 SERPL-SCNC: 23 MMOL/L — SIGNIFICANT CHANGE UP (ref 22–31)
CREAT SERPL-MCNC: 7.12 MG/DL — HIGH (ref 0.5–1.3)
GLUCOSE BLDC GLUCOMTR-MCNC: 121 MG/DL — HIGH (ref 70–99)
GLUCOSE BLDC GLUCOMTR-MCNC: 162 MG/DL — HIGH (ref 70–99)
GLUCOSE BLDC GLUCOMTR-MCNC: 86 MG/DL — SIGNIFICANT CHANGE UP (ref 70–99)
GLUCOSE SERPL-MCNC: 149 MG/DL — HIGH (ref 70–99)
HCT VFR BLD CALC: 43.1 % — SIGNIFICANT CHANGE UP (ref 39–50)
HGB BLD-MCNC: 13 G/DL — SIGNIFICANT CHANGE UP (ref 13–17)
INR BLD: 1.54 RATIO — HIGH (ref 0.88–1.16)
MCHC RBC-ENTMCNC: 27.7 PG — SIGNIFICANT CHANGE UP (ref 27–34)
MCHC RBC-ENTMCNC: 30.2 GM/DL — LOW (ref 32–36)
MCV RBC AUTO: 91.9 FL — SIGNIFICANT CHANGE UP (ref 80–100)
NRBC # BLD: 0 /100 WBCS — SIGNIFICANT CHANGE UP (ref 0–0)
PLATELET # BLD AUTO: 148 K/UL — LOW (ref 150–400)
POTASSIUM SERPL-MCNC: 4.8 MMOL/L — SIGNIFICANT CHANGE UP (ref 3.5–5.3)
POTASSIUM SERPL-SCNC: 4.8 MMOL/L — SIGNIFICANT CHANGE UP (ref 3.5–5.3)
PROTHROM AB SERPL-ACNC: 18 SEC — HIGH (ref 10–12.9)
RBC # BLD: 4.69 M/UL — SIGNIFICANT CHANGE UP (ref 4.2–5.8)
RBC # FLD: 15.9 % — HIGH (ref 10.3–14.5)
SODIUM SERPL-SCNC: 132 MMOL/L — LOW (ref 135–145)
WBC # BLD: 9.76 K/UL — SIGNIFICANT CHANGE UP (ref 3.8–10.5)
WBC # FLD AUTO: 9.76 K/UL — SIGNIFICANT CHANGE UP (ref 3.8–10.5)

## 2020-01-25 PROCEDURE — 99233 SBSQ HOSP IP/OBS HIGH 50: CPT

## 2020-01-25 RX ORDER — WARFARIN SODIUM 2.5 MG/1
5 TABLET ORAL AT BEDTIME
Refills: 0 | Status: DISCONTINUED | OUTPATIENT
Start: 2020-01-25 | End: 2020-01-26

## 2020-01-25 RX ADMIN — SIMVASTATIN 40 MILLIGRAM(S): 20 TABLET, FILM COATED ORAL at 21:39

## 2020-01-25 RX ADMIN — Medication 667 MILLIGRAM(S): at 09:04

## 2020-01-25 RX ADMIN — KETOTIFEN FUMARATE 1 DROP(S): 0.34 SOLUTION OPHTHALMIC at 13:01

## 2020-01-25 RX ADMIN — Medication 667 MILLIGRAM(S): at 21:39

## 2020-01-25 RX ADMIN — Medication 1 SPRAY(S): at 05:53

## 2020-01-25 RX ADMIN — AMIODARONE HYDROCHLORIDE 200 MILLIGRAM(S): 400 TABLET ORAL at 05:51

## 2020-01-25 RX ADMIN — TIOTROPIUM BROMIDE 1 CAPSULE(S): 18 CAPSULE ORAL; RESPIRATORY (INHALATION) at 13:00

## 2020-01-25 RX ADMIN — Medication 5 UNIT(S): at 09:03

## 2020-01-25 RX ADMIN — Medication 81 MILLIGRAM(S): at 13:00

## 2020-01-25 RX ADMIN — WARFARIN SODIUM 5 MILLIGRAM(S): 2.5 TABLET ORAL at 21:39

## 2020-01-25 RX ADMIN — Medication 2: at 09:04

## 2020-01-25 RX ADMIN — Medication 100 MILLIGRAM(S): at 13:00

## 2020-01-25 RX ADMIN — PANTOPRAZOLE SODIUM 40 MILLIGRAM(S): 20 TABLET, DELAYED RELEASE ORAL at 05:52

## 2020-01-25 RX ADMIN — Medication 667 MILLIGRAM(S): at 13:00

## 2020-01-25 NOTE — PROGRESS NOTE ADULT - SUBJECTIVE AND OBJECTIVE BOX
S: Feels better. SOB/Cough improved. Denies chest pain. Review of systems otherwise (-)      MEDICATIONS  (STANDING):  aMIOdarone    Tablet 200 milliGRAM(s) Oral daily  aspirin enteric coated 81 milliGRAM(s) Oral daily  calcium acetate 667 milliGRAM(s) Oral three times a day with meals  dextrose 5%. 1000 milliLiter(s) (50 mL/Hr) IV Continuous <Continuous>  dextrose 50% Injectable 12.5 Gram(s) IV Push once  dextrose 50% Injectable 25 Gram(s) IV Push once  dextrose 50% Injectable 25 Gram(s) IV Push once  fluticasone propionate 50 MICROgram(s)/spray Nasal Spray 1 Spray(s) Both Nostrils two times a day  insulin glargine Injectable (LANTUS) 8 Unit(s) SubCutaneous at bedtime  insulin lispro (HumaLOG) corrective regimen sliding scale   SubCutaneous three times a day before meals  insulin lispro (HumaLOG) corrective regimen sliding scale   SubCutaneous at bedtime  insulin lispro Injectable (HumaLOG) 5 Unit(s) SubCutaneous three times a day before meals  ketotifen 0.025% Ophthalmic Solution 1 Drop(s) Both EYES daily  methimazole 5 milliGRAM(s) Oral daily  metoprolol succinate  milliGRAM(s) Oral daily  pantoprazole    Tablet 40 milliGRAM(s) Oral before breakfast  simvastatin 40 milliGRAM(s) Oral at bedtime  sodium chloride 0.65% Nasal 1 Spray(s) Both Nostrils two times a day  tiotropium 18 MICROgram(s) Capsule 1 Capsule(s) Inhalation daily  warfarin 5 milliGRAM(s) Oral at bedtime    MEDICATIONS  (PRN):  acetaminophen   Tablet .. 650 milliGRAM(s) Oral every 6 hours PRN Temp greater or equal to 38C (100.4F), Mild Pain (1 - 3)  albuterol/ipratropium for Nebulization. 3 milliLiter(s) Nebulizer every 6 hours PRN Shortness of Breath and/or Wheezing  dextrose 40% Gel 15 Gram(s) Oral once PRN Blood Glucose LESS THAN 70 milliGRAM(s)/deciliter  glucagon  Injectable 1 milliGRAM(s) IntraMuscular once PRN Glucose LESS THAN 70 milligrams/deciliter  guaiFENesin   Syrup  (Sugar-Free) 100 milliGRAM(s) Oral every 6 hours PRN Cough      LABS:                     13.0   9.76  )-----------( 148      ( 25 Jan 2020 06:55 )             43.1     132<L>  |  89<L>  |  54<H>  ----------------------------<  149<H>  4.8   |  23  |  7.12<H>    Ca    9.0      25 Jan 2020 06:55      Creatinine Trend: 7.12<--, 4.92<--, 5.66<--, 6.74<--, 9.07<--, 8.36<--   PT/INR - ( 25 Jan 2020 06:57 )   PT: 18.0 sec;   INR: 1.54   PTT - ( 25 Jan 2020 06:57 )  PTT:30.5 sec    PHYSICAL EXAM  Vital Signs Last 24 Hrs  T(C): 37.1 (25 Jan 2020 09:49), Max: 37.2 (25 Jan 2020 05:36)  T(F): 98.7 (25 Jan 2020 09:49), Max: 98.9 (25 Jan 2020 05:36)  HR: 86 (25 Jan 2020 09:49) (66 - 86)  BP: 113/67 (25 Jan 2020 09:49) (100/63 - 130/79)  RR: 18 (25 Jan 2020 09:49) (18 - 18)  SpO2: 97% (25 Jan 2020 09:49) (95% - 97%)      Gen: Appears well in NAD  HEENT:  (-)icterus (-)pallor  CV: N S1 S2 1/6 DEJAH (+)2 Pulses B/l  Resp:  coarse BS BL  GI: (+) BS Soft, NT, ND  Lymph:  (-)Edema, (-)obvious lymphadenopathy  Skin: Warm to touch, Normal turgor  Psych: Appropriate mood and affect    TELE: AF 60-80    ASSESSMENT/PLAN: 68 y/o male well known to our office (Cardiologist - Dr. Méndez) with CAD s/p multiple stents and recent CABG in 9/2019 with course c/b Afib on coumadin, HTN, HLD, DM2, and ESRD on HD presents with lethargy, cough/SOB, LE edema and reported syncopal episode 1 week ago found to be volume overloaded with +parainfluenza and episode of syncope one week ago.     -Volume status and dyspnea improved  -TTE with normal LV function but with RVE with decreased RV function, Reviewed the echo, RV size is borderline enlarged but likely to volume status and loading conditions.  Doubt he truly has stage III diastolic dysfunction as his LA size is normal.  -Admission CT negative for PE - Pulm Eval noted  -Fluid removal with HD per renal  -Recommend lifelong ac with coumadin if no contraindications.  His INR is subtherapeutic but he is not being bridged with heparin gtt due to epistaxis.  -No plans for RHC, Plan to repeat echo in office once euvolemic and  acute URI has resolved  -Patient to f/u with Dr. Méndez on Tuesday 2/11 at 10:30 AM

## 2020-01-25 NOTE — PROGRESS NOTE ADULT - SUBJECTIVE AND OBJECTIVE BOX
Nephrology Followup Note - [phone] - Dr Mendiola / Dr Knight / Dr Macias / Dr Arroyo / Dr Mackey / Dr Bowen / Dr Esposito / Dr Calle  Pt seen and examined at bedside  No acute events overnight. No complaints.     Allergies:  lisinopril (Other)    Hospital Medications:   MEDICATIONS  (STANDING):  aMIOdarone    Tablet 200 milliGRAM(s) Oral daily  aspirin enteric coated 81 milliGRAM(s) Oral daily  calcium acetate 667 milliGRAM(s) Oral three times a day with meals  dextrose 5%. 1000 milliLiter(s) (50 mL/Hr) IV Continuous <Continuous>  dextrose 50% Injectable 12.5 Gram(s) IV Push once  dextrose 50% Injectable 25 Gram(s) IV Push once  dextrose 50% Injectable 25 Gram(s) IV Push once  fluticasone propionate 50 MICROgram(s)/spray Nasal Spray 1 Spray(s) Both Nostrils two times a day  insulin glargine Injectable (LANTUS) 8 Unit(s) SubCutaneous at bedtime  insulin lispro (HumaLOG) corrective regimen sliding scale   SubCutaneous three times a day before meals  insulin lispro (HumaLOG) corrective regimen sliding scale   SubCutaneous at bedtime  insulin lispro Injectable (HumaLOG) 5 Unit(s) SubCutaneous three times a day before meals  ketotifen 0.025% Ophthalmic Solution 1 Drop(s) Both EYES daily  methimazole 5 milliGRAM(s) Oral daily  metoprolol succinate  milliGRAM(s) Oral daily  pantoprazole    Tablet 40 milliGRAM(s) Oral before breakfast  simvastatin 40 milliGRAM(s) Oral at bedtime  sodium chloride 0.65% Nasal 1 Spray(s) Both Nostrils two times a day  tiotropium 18 MICROgram(s) Capsule 1 Capsule(s) Inhalation daily  warfarin 5 milliGRAM(s) Oral at bedtime      VITALS:  T(F): 98.7 (01-25-20 @ 09:49), Max: 98.9 (01-25-20 @ 05:36)  HR: 86 (01-25-20 @ 09:49)  BP: 113/67 (01-25-20 @ 09:49)  RR: 18 (01-25-20 @ 09:49)  SpO2: 97% (01-25-20 @ 09:49)  Wt(kg): --    01-24 @ 07:01  -  01-25 @ 07:00  --------------------------------------------------------  IN: 240 mL / OUT: 0 mL / NET: 240 mL        PHYSICAL EXAM:  Constitutional: NAD  HEENT: anicteric sclera, oropharynx clear, MMM  Neck: No JVD  Respiratory: CTAB, no wheezes, rales or rhonchi  Cardiovascular: S1, S2, RRR  Gastrointestinal: BS+, soft, NT/ND  Extremities: No cyanosis or clubbing. No peripheral edema  Neurological: A/O x 3, no focal deficits  Psychiatric: Normal mood, normal affect  : No CVA tenderness. No wagner.   Skin: No rashes  Vascular Access: LUE AV access +thrill and bruit.     LABS:  01-25    132<L>  |  89<L>  |  54<H>  ----------------------------<  149<H>  4.8   |  23  |  7.12<H>    Ca    9.0      25 Jan 2020 06:55      Creatinine Trend: 7.12 <--, 4.92 <--, 5.66 <--, 6.74 <--, 9.07 <--, 8.36 <--                        13.0   9.76  )-----------( 148      ( 25 Jan 2020 06:55 )             43.1     Urine Studies:      RADIOLOGY & ADDITIONAL STUDIES:

## 2020-01-25 NOTE — PROGRESS NOTE ADULT - PROBLEM SELECTOR PLAN 1
Likely multifactorial from parainfluenza, pleural effusion and from volume overload  from new decompensated acute diastolic CHF (BNP 36,000 and CAD), likely due to ESRD related volume overload. Still has +JVD, s/p 3 sessions of HD over last 4 days. Plan for another session today at 5pm.   Acute respiratory failure with hypoxia (88% RA), not on home O2; now on supplemental oxygen- titrate down as tolerated.  If volume is at goal by tomorrow and not hypoxic likely stable for discharge. CM notified today.

## 2020-01-25 NOTE — PROGRESS NOTE ADULT - SUBJECTIVE AND OBJECTIVE BOX
[facility] Division of Hospital Medicine  Thaddeus Zelaya MD, LAY  Pager (M-F, 8A-5P): [phone]  Other Times:  [phone]    Patient is a 69y old  Male who presents with a chief complaint of lethargy, coughing, SOB (25 Jan 2020 12:33)      SUBJECTIVE / OVERNIGHT EVENTS:  No events overnight. No new complaints.     MEDICATIONS  (STANDING):  aMIOdarone    Tablet 200 milliGRAM(s) Oral daily  aspirin enteric coated 81 milliGRAM(s) Oral daily  calcium acetate 667 milliGRAM(s) Oral three times a day with meals  fluticasone propionate 50 MICROgram(s)/spray Nasal Spray 1 Spray(s) Both Nostrils two times a day  insulin glargine Injectable (LANTUS) 8 Unit(s) SubCutaneous at bedtime  insulin lispro (HumaLOG) corrective regimen sliding scale   SubCutaneous three times a day before meals  insulin lispro (HumaLOG) corrective regimen sliding scale   SubCutaneous at bedtime  insulin lispro Injectable (HumaLOG) 5 Unit(s) SubCutaneous three times a day before meals  ketotifen 0.025% Ophthalmic Solution 1 Drop(s) Both EYES daily  methimazole 5 milliGRAM(s) Oral daily  metoprolol succinate  milliGRAM(s) Oral daily  pantoprazole    Tablet 40 milliGRAM(s) Oral before breakfast  simvastatin 40 milliGRAM(s) Oral at bedtime  sodium chloride 0.65% Nasal 1 Spray(s) Both Nostrils two times a day  tiotropium 18 MICROgram(s) Capsule 1 Capsule(s) Inhalation daily  warfarin 5 milliGRAM(s) Oral at bedtime    MEDICATIONS  (PRN):  acetaminophen   Tablet .. 650 milliGRAM(s) Oral every 6 hours PRN Temp greater or equal to 38C (100.4F), Mild Pain (1 - 3)  albuterol/ipratropium for Nebulization. 3 milliLiter(s) Nebulizer every 6 hours PRN Shortness of Breath and/or Wheezing  dextrose 40% Gel 15 Gram(s) Oral once PRN Blood Glucose LESS THAN 70 milliGRAM(s)/deciliter  glucagon  Injectable 1 milliGRAM(s) IntraMuscular once PRN Glucose LESS THAN 70 milligrams/deciliter  guaiFENesin   Syrup  (Sugar-Free) 100 milliGRAM(s) Oral every 6 hours PRN Cough    CAPILLARY BLOOD GLUCOSE  POCT Blood Glucose.: 121 mg/dL (25 Jan 2020 14:13)  POCT Blood Glucose.: 162 mg/dL (25 Jan 2020 09:02)  POCT Blood Glucose.: 127 mg/dL (24 Jan 2020 21:38)  POCT Blood Glucose.: 134 mg/dL (24 Jan 2020 19:15)    I&O's Summary    24 Jan 2020 07:01  -  25 Jan 2020 07:00  --------------------------------------------------------  IN: 240 mL / OUT: 0 mL / NET: 240 mL        PHYSICAL EXAM:  Vital Signs Last 24 Hrs  T(C): 36.7 (25 Jan 2020 12:41), Max: 37.2 (25 Jan 2020 05:36)  T(F): 98.1 (25 Jan 2020 12:41), Max: 98.9 (25 Jan 2020 05:36)  HR: 71 (25 Jan 2020 13:12) (66 - 86)  BP: 116/69 (25 Jan 2020 13:12) (100/63 - 130/79)  BP(mean): --  RR: 18 (25 Jan 2020 12:41) (18 - 18)  SpO2: 99% (25 Jan 2020 12:41) (95% - 99%)    GENERAL: NAD, well-developed  HEAD:  Atraumatic, Normocephalic  EYES:  conjunctiva and sclera clear  NECK: Supple, +JVD  CHEST/LUNG: Clear to auscultation bilaterally; No wheeze  HEART: Regular rate and rhythm; No murmurs, rubs, or gallops  ABDOMEN: Soft, Nontender, Nondistended; Bowel sounds present  EXTREMITIES:  No clubbing, cyanosis, or edema  PSYCH: AAOx3      LABS:                        13.0   9.76  )-----------( 148      ( 25 Jan 2020 06:55 )             43.1     01-25    132<L>  |  89<L>  |  54<H>  ----------------------------<  149<H>  4.8   |  23  |  7.12<H>    Ca    9.0      25 Jan 2020 06:55      PT/INR - ( 25 Jan 2020 06:57 )   PT: 18.0 sec;   INR: 1.54 ratio    PTT - ( 25 Jan 2020 06:57 )  PTT:30.5 sec

## 2020-01-25 NOTE — PROGRESS NOTE ADULT - ATTENDING COMMENTS
Agree with above  Recheck TTE as outpatient when euvolemic   Lifelong ac for cva prevention if no contraindications    Kalie Lau MD

## 2020-01-25 NOTE — PROGRESS NOTE ADULT - PROBLEM SELECTOR PLAN 6
hypoxia and volume overload, possibly 2/2 ESRD   no electrolyte abnormalities  HD on 1/21, 1/22, 1/23, 1/25 (later today)

## 2020-01-25 NOTE — PROGRESS NOTE ADULT - ATTENDING COMMENTS
Dr Rudy Mendiola  Nephrology  Office [phone]  Ans Serv [phone]  [facility] [phone]

## 2020-01-25 NOTE — PROGRESS NOTE ADULT - PROBLEM SELECTOR PLAN 8
currently remain in Afib, rate controlled   c/w metoprolol and amiodarone   INR is subtherapeutic - resume coumadin - 5mg this morning. Will likely need to go back on 3mg daily once discharged. Missed 3 days of coumadin prior to coming in to the hospital.

## 2020-01-25 NOTE — PROGRESS NOTE ADULT - PROBLEM SELECTOR PLAN 1
plan for routine hd today, increase UF goal as BP tolerates   trend bmp  anemia in ckd- hgb above goal- no need for shiv at this time

## 2020-01-26 LAB
ANION GAP SERPL CALC-SCNC: 16 MMOL/L — SIGNIFICANT CHANGE UP (ref 5–17)
APTT BLD: 30.8 SEC — SIGNIFICANT CHANGE UP (ref 27.5–36.3)
BUN SERPL-MCNC: 33 MG/DL — HIGH (ref 7–23)
CALCIUM SERPL-MCNC: 9.1 MG/DL — SIGNIFICANT CHANGE UP (ref 8.4–10.5)
CHLORIDE SERPL-SCNC: 93 MMOL/L — LOW (ref 96–108)
CO2 SERPL-SCNC: 24 MMOL/L — SIGNIFICANT CHANGE UP (ref 22–31)
CREAT SERPL-MCNC: 5.47 MG/DL — HIGH (ref 0.5–1.3)
GLUCOSE BLDC GLUCOMTR-MCNC: 100 MG/DL — HIGH (ref 70–99)
GLUCOSE BLDC GLUCOMTR-MCNC: 121 MG/DL — HIGH (ref 70–99)
GLUCOSE BLDC GLUCOMTR-MCNC: 156 MG/DL — HIGH (ref 70–99)
GLUCOSE BLDC GLUCOMTR-MCNC: 172 MG/DL — HIGH (ref 70–99)
GLUCOSE BLDC GLUCOMTR-MCNC: 192 MG/DL — HIGH (ref 70–99)
GLUCOSE SERPL-MCNC: 149 MG/DL — HIGH (ref 70–99)
HCT VFR BLD CALC: 43 % — SIGNIFICANT CHANGE UP (ref 39–50)
HGB BLD-MCNC: 13 G/DL — SIGNIFICANT CHANGE UP (ref 13–17)
INR BLD: 1.62 RATIO — HIGH (ref 0.88–1.16)
MCHC RBC-ENTMCNC: 27.5 PG — SIGNIFICANT CHANGE UP (ref 27–34)
MCHC RBC-ENTMCNC: 30.2 GM/DL — LOW (ref 32–36)
MCV RBC AUTO: 91.1 FL — SIGNIFICANT CHANGE UP (ref 80–100)
NRBC # BLD: 0 /100 WBCS — SIGNIFICANT CHANGE UP (ref 0–0)
PLATELET # BLD AUTO: 144 K/UL — LOW (ref 150–400)
POTASSIUM SERPL-MCNC: 4.4 MMOL/L — SIGNIFICANT CHANGE UP (ref 3.5–5.3)
POTASSIUM SERPL-SCNC: 4.4 MMOL/L — SIGNIFICANT CHANGE UP (ref 3.5–5.3)
PROTHROM AB SERPL-ACNC: 18.7 SEC — HIGH (ref 10–12.9)
RBC # BLD: 4.72 M/UL — SIGNIFICANT CHANGE UP (ref 4.2–5.8)
RBC # FLD: 15.8 % — HIGH (ref 10.3–14.5)
SODIUM SERPL-SCNC: 133 MMOL/L — LOW (ref 135–145)
WBC # BLD: 8.72 K/UL — SIGNIFICANT CHANGE UP (ref 3.8–10.5)
WBC # FLD AUTO: 8.72 K/UL — SIGNIFICANT CHANGE UP (ref 3.8–10.5)

## 2020-01-26 PROCEDURE — 71045 X-RAY EXAM CHEST 1 VIEW: CPT | Mod: 26

## 2020-01-26 PROCEDURE — 99233 SBSQ HOSP IP/OBS HIGH 50: CPT

## 2020-01-26 RX ORDER — WARFARIN SODIUM 2.5 MG/1
3 TABLET ORAL ONCE
Refills: 0 | Status: COMPLETED | OUTPATIENT
Start: 2020-01-26 | End: 2020-01-26

## 2020-01-26 RX ADMIN — Medication 100 MILLIGRAM(S): at 06:11

## 2020-01-26 RX ADMIN — Medication 1 SPRAY(S): at 17:32

## 2020-01-26 RX ADMIN — AMIODARONE HYDROCHLORIDE 200 MILLIGRAM(S): 400 TABLET ORAL at 06:11

## 2020-01-26 RX ADMIN — WARFARIN SODIUM 3 MILLIGRAM(S): 2.5 TABLET ORAL at 21:19

## 2020-01-26 RX ADMIN — Medication 667 MILLIGRAM(S): at 09:30

## 2020-01-26 RX ADMIN — Medication 1 SPRAY(S): at 06:11

## 2020-01-26 RX ADMIN — KETOTIFEN FUMARATE 1 DROP(S): 0.34 SOLUTION OPHTHALMIC at 12:04

## 2020-01-26 RX ADMIN — Medication 81 MILLIGRAM(S): at 12:04

## 2020-01-26 RX ADMIN — TIOTROPIUM BROMIDE 1 CAPSULE(S): 18 CAPSULE ORAL; RESPIRATORY (INHALATION) at 12:04

## 2020-01-26 RX ADMIN — INSULIN GLARGINE 8 UNIT(S): 100 INJECTION, SOLUTION SUBCUTANEOUS at 00:51

## 2020-01-26 RX ADMIN — INSULIN GLARGINE 8 UNIT(S): 100 INJECTION, SOLUTION SUBCUTANEOUS at 22:09

## 2020-01-26 RX ADMIN — SIMVASTATIN 40 MILLIGRAM(S): 20 TABLET, FILM COATED ORAL at 21:19

## 2020-01-26 RX ADMIN — Medication 2: at 08:19

## 2020-01-26 RX ADMIN — Medication 5 UNIT(S): at 17:32

## 2020-01-26 RX ADMIN — Medication 2: at 13:02

## 2020-01-26 RX ADMIN — Medication 667 MILLIGRAM(S): at 17:32

## 2020-01-26 RX ADMIN — Medication 5 UNIT(S): at 08:19

## 2020-01-26 RX ADMIN — Medication 5 UNIT(S): at 13:02

## 2020-01-26 RX ADMIN — Medication 667 MILLIGRAM(S): at 12:04

## 2020-01-26 RX ADMIN — PANTOPRAZOLE SODIUM 40 MILLIGRAM(S): 20 TABLET, DELAYED RELEASE ORAL at 06:11

## 2020-01-26 NOTE — PROGRESS NOTE ADULT - SUBJECTIVE AND OBJECTIVE BOX
S: Denies SOB  and Denies chest pain. Review of systems otherwise (-)      MEDICATIONS  (STANDING):  aMIOdarone    Tablet 200 milliGRAM(s) Oral daily  aspirin enteric coated 81 milliGRAM(s) Oral daily  calcium acetate 667 milliGRAM(s) Oral three times a day with meals  fluticasone propionate 50 MICROgram(s)/spray Nasal Spray 1 Spray(s) Both Nostrils two times a day  insulin glargine Injectable (LANTUS) 8 Unit(s) SubCutaneous at bedtime  insulin lispro (HumaLOG) corrective regimen sliding scale   SubCutaneous three times a day before meals  insulin lispro (HumaLOG) corrective regimen sliding scale   SubCutaneous at bedtime  insulin lispro Injectable (HumaLOG) 5 Unit(s) SubCutaneous three times a day before meals  ketotifen 0.025% Ophthalmic Solution 1 Drop(s) Both EYES daily  methimazole 5 milliGRAM(s) Oral daily  metoprolol succinate  milliGRAM(s) Oral daily  pantoprazole    Tablet 40 milliGRAM(s) Oral before breakfast  simvastatin 40 milliGRAM(s) Oral at bedtime  sodium chloride 0.65% Nasal 1 Spray(s) Both Nostrils two times a day  tiotropium 18 MICROgram(s) Capsule 1 Capsule(s) Inhalation daily  warfarin 3 milliGRAM(s) Oral once    MEDICATIONS  (PRN):  acetaminophen   Tablet .. 650 milliGRAM(s) Oral every 6 hours PRN Temp greater or equal to 38C (100.4F), Mild Pain (1 - 3)  albuterol/ipratropium for Nebulization. 3 milliLiter(s) Nebulizer every 6 hours PRN Shortness of Breath and/or Wheezing  dextrose 40% Gel 15 Gram(s) Oral once PRN Blood Glucose LESS THAN 70 milliGRAM(s)/deciliter  glucagon  Injectable 1 milliGRAM(s) IntraMuscular once PRN Glucose LESS THAN 70 milligrams/deciliter  guaiFENesin   Syrup  (Sugar-Free) 100 milliGRAM(s) Oral every 6 hours PRN Cough      LABS:                            13.0   8.72  )-----------( 144      ( 26 Jan 2020 06:46 )             43.0     Hemoglobin: 13.0 g/dL (01-26 @ 06:46)  Hemoglobin: 13.0 g/dL (01-25 @ 06:55)  Hemoglobin: 13.1 g/dL (01-24 @ 08:08)  Hemoglobin: 12.9 g/dL (01-24 @ 00:30)  Hemoglobin: 13.0 g/dL (01-23 @ 06:06)    01-26    133<L>  |  93<L>  |  33<H>  ----------------------------<  149<H>  4.4   |  24  |  5.47<H>    Ca    9.1      26 Jan 2020 06:46      Creatinine Trend: 5.47<--, 7.12<--, 4.92<--, 5.66<--, 6.74<--, 9.07<--   PT/INR - ( 26 Jan 2020 06:46 )   PT: 18.7 sec;   INR: 1.62 ratio         PTT - ( 26 Jan 2020 06:46 )  PTT:30.8 sec        PHYSICAL EXAM  Vital Signs Last 24 Hrs  T(C): 37.4 (26 Jan 2020 06:11), Max: 37.4 (26 Jan 2020 06:11)  T(F): 99.3 (26 Jan 2020 06:11), Max: 99.3 (26 Jan 2020 06:11)  HR: 75 (26 Jan 2020 06:11) (62 - 89)  BP: 121/60 (26 Jan 2020 06:11) (102/65 - 127/66)  BP(mean): --  RR: 18 (26 Jan 2020 06:11) (16 - 18)  SpO2: 94% (26 Jan 2020 06:11) (92% - 98%)      Gen: Appears well in NAD  HEENT:  (-)icterus (-)pallor  CV: N S1 S2 1/6 DEJAH (+)2 Pulses B/l  Resp: CTABL no wheeze or crackles   GI: (+) BS Soft, NT, ND  Lymph:  (-)Edema, (-)obvious lymphadenopathy  Skin: Warm to touch, Normal turgor  Psych: Appropriate mood and affect    TELE: AF 60-80    ASSESSMENT/PLAN: 68 y/o male well known to our office (Cardiologist - Dr. Méndez) with CAD s/p multiple stents and recent CABG in 9/2019 with course c/b Afib on coumadin, HTN, HLD, DM2, and ESRD on HD presents with lethargy, cough/SOB, LE edema and reported syncopal episode 1 week ago found to be volume overloaded with +parainfluenza and episode of syncope one week ago.     -Volume status and dyspnea improved  -TTE with normal LV function but with RVE with decreased RV function, Reviewed the echo, RV size is borderline enlarged but likely to secondary to volume status and loading conditions.  Doubt he truly has stage III diastolic dysfunction as his LA size is normal.  -Admission CT negative for PE - Pulm Eval noted  -Fluid removal with HD per renal  -Recommend lifelong ac with coumadin if no contraindications.  His INR is subtherapeutic but he is not being bridged with heparin gtt due to epistaxis.  -No plans for RHC, Plan to repeat echo in office once euvolemic and  acute URI has resolved  -Patient to f/u with Dr. Méndez on Tuesday 2/11 at 10:30 AM

## 2020-01-26 NOTE — PROGRESS NOTE ADULT - ATTENDING COMMENTS
Patient seen and examined.  Agree with above.   Lifelong ac if no contraindication  Will plan on repeat TTE as outpatient when euvolemic    Kalie Lau MD Patient seen and examined.  Agree with above.   Lifelong ac if no contraindication  Will plan on repeat TTE as outpatient when euvolemic  Back on supplemental O2 - lungs clear on exam - check CXR  Discussed with covering NP    Kalie Lau MD

## 2020-01-26 NOTE — PROGRESS NOTE ADULT - SUBJECTIVE AND OBJECTIVE BOX
Patient is a 69y old  Male who presents with a chief complaint of lethargy, coughing, SOB (25 Jan 2020 15:21)      SUBJECTIVE / OVERNIGHT EVENTS:  Feels much better.   Tolerated HD well yesterday.    MEDICATIONS  (STANDING):  aMIOdarone    Tablet 200 milliGRAM(s) Oral daily  aspirin enteric coated 81 milliGRAM(s) Oral daily  calcium acetate 667 milliGRAM(s) Oral three times a day with meals  dextrose 5%. 1000 milliLiter(s) (50 mL/Hr) IV Continuous <Continuous>  dextrose 50% Injectable 12.5 Gram(s) IV Push once  dextrose 50% Injectable 25 Gram(s) IV Push once  dextrose 50% Injectable 25 Gram(s) IV Push once  fluticasone propionate 50 MICROgram(s)/spray Nasal Spray 1 Spray(s) Both Nostrils two times a day  insulin glargine Injectable (LANTUS) 8 Unit(s) SubCutaneous at bedtime  insulin lispro (HumaLOG) corrective regimen sliding scale   SubCutaneous three times a day before meals  insulin lispro (HumaLOG) corrective regimen sliding scale   SubCutaneous at bedtime  insulin lispro Injectable (HumaLOG) 5 Unit(s) SubCutaneous three times a day before meals  ketotifen 0.025% Ophthalmic Solution 1 Drop(s) Both EYES daily  methimazole 5 milliGRAM(s) Oral daily  metoprolol succinate  milliGRAM(s) Oral daily  pantoprazole    Tablet 40 milliGRAM(s) Oral before breakfast  simvastatin 40 milliGRAM(s) Oral at bedtime  sodium chloride 0.65% Nasal 1 Spray(s) Both Nostrils two times a day  tiotropium 18 MICROgram(s) Capsule 1 Capsule(s) Inhalation daily  warfarin 5 milliGRAM(s) Oral at bedtime  warfarin 3 milliGRAM(s) Oral once    MEDICATIONS  (PRN):  acetaminophen   Tablet .. 650 milliGRAM(s) Oral every 6 hours PRN Temp greater or equal to 38C (100.4F), Mild Pain (1 - 3)  albuterol/ipratropium for Nebulization. 3 milliLiter(s) Nebulizer every 6 hours PRN Shortness of Breath and/or Wheezing  dextrose 40% Gel 15 Gram(s) Oral once PRN Blood Glucose LESS THAN 70 milliGRAM(s)/deciliter  glucagon  Injectable 1 milliGRAM(s) IntraMuscular once PRN Glucose LESS THAN 70 milligrams/deciliter  guaiFENesin   Syrup  (Sugar-Free) 100 milliGRAM(s) Oral every 6 hours PRN Cough      CAPILLARY BLOOD GLUCOSE      POCT Blood Glucose.: 192 mg/dL (26 Jan 2020 08:10)  POCT Blood Glucose.: 172 mg/dL (26 Jan 2020 00:46)  POCT Blood Glucose.: 86 mg/dL (25 Jan 2020 21:17)  POCT Blood Glucose.: 121 mg/dL (25 Jan 2020 14:13)    I&O's Summary    25 Jan 2020 07:01  -  26 Jan 2020 07:00  --------------------------------------------------------  IN: 1140 mL / OUT: 2900 mL / NET: -1760 mL        PHYSICAL EXAM:  Vital Signs Last 24 Hrs  T(C): 37.4 (26 Jan 2020 06:11), Max: 37.4 (26 Jan 2020 06:11)  T(F): 99.3 (26 Jan 2020 06:11), Max: 99.3 (26 Jan 2020 06:11)  HR: 75 (26 Jan 2020 06:11) (62 - 89)  BP: 121/60 (26 Jan 2020 06:11) (102/65 - 127/71)  BP(mean): --  RR: 18 (26 Jan 2020 06:11) (16 - 18)  SpO2: 94% (26 Jan 2020 06:11) (92% - 99%)  GENERAL: NAD, well-developed  HEAD:  Atraumatic, Normocephalic  EYES: EOMI, PERRLA, conjunctiva and sclera clear  NECK: Supple, No JVD  CHEST/LUNG: Clear to auscultation bilaterally; No wheeze  HEART: Regular rate and rhythm; No murmurs, rubs, or gallops  ABDOMEN: Soft, Nontender, Nondistended; Bowel sounds present  EXTREMITIES:  2+ Peripheral Pulses, No clubbing, cyanosis, or edema  PSYCH: AAOx3  NEUROLOGY: non-focal  SKIN: No rashes or lesions    LABS:                        13.0   8.72  )-----------( 144      ( 26 Jan 2020 06:46 )             43.0     01-26    133<L>  |  93<L>  |  33<H>  ----------------------------<  149<H>  4.4   |  24  |  5.47<H>    Ca    9.1      26 Jan 2020 06:46      PT/INR - ( 26 Jan 2020 06:46 )   PT: 18.7 sec;   INR: 1.62 ratio         PTT - ( 26 Jan 2020 06:46 )  PTT:30.8 sec          RADIOLOGY & ADDITIONAL TESTS:    Imaging Personally Reviewed:    Consultant(s) Notes Reviewed:      Care Discussed with Consultants/Other Providers:

## 2020-01-26 NOTE — PROGRESS NOTE ADULT - PROBLEM SELECTOR PLAN 9
- C/w lower lantus 8 units qHS, c/w humalog 5 tid, HISS  - Monitor FS ac and hs  - Hypoglycemia protocol

## 2020-01-26 NOTE — PROGRESS NOTE ADULT - PROBLEM SELECTOR PLAN 2
- TTE - Mild MR, no LV thrombus, normal left ventricular systolic function, wall   motion abnormalities, right ventricular overload, severe reversible diastolic   dysfunction (Stage III), Moderate right atrial enlargement, Right ventricular   enlargement with decreased right ventricular systolic function.  - Pulm and cardiology evaluations appreciated. Per pulm, he may require RHC for   further work up. Per cardiology, plan for outpatient TTE in near future once   euvolemic. No plan for RHC at this time per cards. In future, if right heart cath is   consistent with lung disease, will need to send rheumatologic work up as per the   pulm note (hold off for now).  - CTA no PE.

## 2020-01-26 NOTE — PROGRESS NOTE ADULT - PROBLEM SELECTOR PLAN 6
- Hypoxia and volume overload, possibly 2/2 ESRD   - No electrolyte abnormalities  - HD on 1/21, 1/22, 1/23, 1/25   - Will discuss with nephrology whether patient required further HD today or if he can go back to his regular schedule.

## 2020-01-26 NOTE — PROGRESS NOTE ADULT - PROBLEM SELECTOR PLAN 1
- Likely multifactorial from parainfluenza, pleural effusion and from volume   overload  from new decompensated acute diastolic CHF (BNP 36,000 and CAD),   likely due to ESRD related volume overload. Still has +JVD, s/p 3 sessions of HD   over last 4 days. Plan for another session today at 5pm.   - Acute respiratory failure with hypoxia (88% RA), not on home O2; now on   supplemental oxygen- titrate down as tolerated.  - Still slightly overloaded today on exam.

## 2020-01-26 NOTE — PROGRESS NOTE ADULT - PROBLEM SELECTOR PLAN 8
- Currently remain in Afib, rate controlled   - C/w metoprolol and amiodarone   - INR is subtherapeutic - resume coumadin - 5mg this morning. Will likely need to   go back on 3mg daily once discharged. Missed 3 days of coumadin prior to   coming in to the hospital.  - 3mg today

## 2020-01-26 NOTE — PROGRESS NOTE ADULT - PROBLEM SELECTOR PLAN 4
- Pt with syncopal episode one week prior to admission as he states as reported,   wife who witnessed fall states no head strike.  - MRI brain consistent with chronic infarcts.   - Monitor on tele

## 2020-01-26 NOTE — PROGRESS NOTE ADULT - PROBLEM SELECTOR PLAN 7
- Noted HS-trop elevation to 132, but now downtrending. Likely demand ischemic   in setting of ESRD, diastolic CHF. CKMB was wnl. No ischemic changes on EKG.   Old RBBB present.  - C/w ASA and statin

## 2020-01-26 NOTE — PROGRESS NOTE ADULT - PROBLEM SELECTOR PLAN 3
- CT with small-mod left lower pleural effusion.  - Pulm and Cardiothoracic surgery eval appreciated - no planned intervention at   this point. The effusion is loculated, and would be challenging to target per CT   surgery.

## 2020-01-27 LAB
GLUCOSE BLDC GLUCOMTR-MCNC: 117 MG/DL — HIGH (ref 70–99)
GLUCOSE BLDC GLUCOMTR-MCNC: 124 MG/DL — HIGH (ref 70–99)
GLUCOSE BLDC GLUCOMTR-MCNC: 186 MG/DL — HIGH (ref 70–99)
GLUCOSE BLDC GLUCOMTR-MCNC: 82 MG/DL — SIGNIFICANT CHANGE UP (ref 70–99)
HCT VFR BLD CALC: 43.4 % — SIGNIFICANT CHANGE UP (ref 39–50)
HGB BLD-MCNC: 13.3 G/DL — SIGNIFICANT CHANGE UP (ref 13–17)
INR BLD: 1.53 RATIO — HIGH (ref 0.88–1.16)
MCHC RBC-ENTMCNC: 27.6 PG — SIGNIFICANT CHANGE UP (ref 27–34)
MCHC RBC-ENTMCNC: 30.6 GM/DL — LOW (ref 32–36)
MCV RBC AUTO: 90 FL — SIGNIFICANT CHANGE UP (ref 80–100)
NRBC # BLD: 0 /100 WBCS — SIGNIFICANT CHANGE UP (ref 0–0)
PLATELET # BLD AUTO: 150 K/UL — SIGNIFICANT CHANGE UP (ref 150–400)
PROTHROM AB SERPL-ACNC: 17.7 SEC — HIGH (ref 10–12.9)
RBC # BLD: 4.82 M/UL — SIGNIFICANT CHANGE UP (ref 4.2–5.8)
RBC # FLD: 15.7 % — HIGH (ref 10.3–14.5)
WBC # BLD: 8.28 K/UL — SIGNIFICANT CHANGE UP (ref 3.8–10.5)
WBC # FLD AUTO: 8.28 K/UL — SIGNIFICANT CHANGE UP (ref 3.8–10.5)

## 2020-01-27 PROCEDURE — 99233 SBSQ HOSP IP/OBS HIGH 50: CPT

## 2020-01-27 RX ORDER — WARFARIN SODIUM 2.5 MG/1
3 TABLET ORAL ONCE
Refills: 0 | Status: COMPLETED | OUTPATIENT
Start: 2020-01-27 | End: 2020-01-27

## 2020-01-27 RX ADMIN — Medication 100 MILLIGRAM(S): at 21:12

## 2020-01-27 RX ADMIN — AMIODARONE HYDROCHLORIDE 200 MILLIGRAM(S): 400 TABLET ORAL at 05:26

## 2020-01-27 RX ADMIN — Medication 1 SPRAY(S): at 05:27

## 2020-01-27 RX ADMIN — Medication 100 MILLIGRAM(S): at 04:12

## 2020-01-27 RX ADMIN — Medication 667 MILLIGRAM(S): at 17:22

## 2020-01-27 RX ADMIN — Medication 81 MILLIGRAM(S): at 11:18

## 2020-01-27 RX ADMIN — WARFARIN SODIUM 3 MILLIGRAM(S): 2.5 TABLET ORAL at 21:12

## 2020-01-27 RX ADMIN — PANTOPRAZOLE SODIUM 40 MILLIGRAM(S): 20 TABLET, DELAYED RELEASE ORAL at 05:26

## 2020-01-27 RX ADMIN — KETOTIFEN FUMARATE 1 DROP(S): 0.34 SOLUTION OPHTHALMIC at 11:18

## 2020-01-27 RX ADMIN — INSULIN GLARGINE 8 UNIT(S): 100 INJECTION, SOLUTION SUBCUTANEOUS at 21:32

## 2020-01-27 RX ADMIN — Medication 1 SPRAY(S): at 15:54

## 2020-01-27 RX ADMIN — SIMVASTATIN 40 MILLIGRAM(S): 20 TABLET, FILM COATED ORAL at 21:11

## 2020-01-27 RX ADMIN — Medication 2: at 12:36

## 2020-01-27 RX ADMIN — Medication 5 UNIT(S): at 12:35

## 2020-01-27 RX ADMIN — Medication 667 MILLIGRAM(S): at 11:19

## 2020-01-27 RX ADMIN — Medication 100 MILLIGRAM(S): at 05:26

## 2020-01-27 RX ADMIN — Medication 1 SPRAY(S): at 17:21

## 2020-01-27 RX ADMIN — Medication 667 MILLIGRAM(S): at 08:00

## 2020-01-27 RX ADMIN — Medication 5 UNIT(S): at 08:17

## 2020-01-27 RX ADMIN — Medication 5 UNIT(S): at 18:05

## 2020-01-27 RX ADMIN — TIOTROPIUM BROMIDE 1 CAPSULE(S): 18 CAPSULE ORAL; RESPIRATORY (INHALATION) at 11:19

## 2020-01-27 NOTE — DIETITIAN INITIAL EVALUATION ADULT. - PROBLEM SELECTOR PLAN 1
Likely multifactorial from parainfluenza, pleural effusion and from volume overload  from new decompensated CHF (BNP 36,000 and CAD) or ESRD  hypoxic to 88% RA, not on home O2; now on 3LNC, saturating well, no respiratory distress   L pleural effusion on CXR, b/l LE edema; however, does not make urine so no role for diuresis   Renal was consulted in ED. HD orders placed for tomorrow. No urgent need for HD at this time   titrate O2 for sat >92%

## 2020-01-27 NOTE — DIETITIAN INITIAL EVALUATION ADULT. - PROBLEM SELECTOR PLAN 7
Denies CP. Noted HS-trop elevation to 132, but now downtrending. Likely demand ischemic in setting of ESRD. CKMB was wnl. No ischemic changes on EKG. Old RBBB present.  - Low c/f ACS.  - c/w ASA and statin

## 2020-01-27 NOTE — DIETITIAN INITIAL EVALUATION ADULT. - PHYSICAL APPEARANCE
pt with no overt signs of fat/muscle loss.  Per flow sheets, pt without noted edema.  Per chart, skin free of pressure ulcers./overweight

## 2020-01-27 NOTE — PROGRESS NOTE ADULT - PROBLEM SELECTOR PLAN 2
- TTE - Mild MR, no LV thrombus, normal left ventricular systolic function, wall   motion abnormalities, right ventricular overload, severe reversible diastolic   dysfunction (Stage III), Moderate right atrial enlargement, Right ventricular   enlargement with decreased right ventricular systolic function.  - Pulm and cardiology evaluations appreciated. Per pulm, he may require outpatient RHC for   further work up. Per cardiology, plan for outpatient TTE in near future once   euvolemic. No plan for RHC at this time per cards. In future, if right heart cath is   consistent with lung disease, will need to send rheumatologic work up as per the   pulm note (hold off for now).  - CTA no PE.

## 2020-01-27 NOTE — DIETITIAN INITIAL EVALUATION ADULT. - PROBLEM SELECTOR PLAN 5
pt with cough and worsening SOB, +sick contacts, found with paraflu+  O2 as needed   tylenol prn   pt states he feels better with nebulizers, c/w q6h standing   Guaifenesin prn cough

## 2020-01-27 NOTE — PROGRESS NOTE ADULT - PROBLEM SELECTOR PLAN 6
- Hypoxia and volume overload, possibly 2/2 ESRD   - No electrolyte abnormalities  - HD on 1/21, 1/22, 1/23, 1/25   -Resuming HD on 1/28

## 2020-01-27 NOTE — DIETITIAN INITIAL EVALUATION ADULT. - PROBLEM SELECTOR PLAN 2
pt noted with moderate L pleural effusion, which is likely contributing to pts symptoms   on review of previous records, pt had post-surgical L pleural effusion requiring chest tube   pt with plan for HD with fluid removal tomorrow; c/t monitor and assess if repeat chest tube needed - consult thoracic surgery in AM

## 2020-01-27 NOTE — PROGRESS NOTE ADULT - PROBLEM SELECTOR PLAN 8
- Currently remain in Afib, rate controlled   - C/w metoprolol and amiodarone   - INR is subtherapeutic - resume coumadin -dose 3mg today

## 2020-01-27 NOTE — PROGRESS NOTE ADULT - PROBLEM SELECTOR PLAN 1
plan for routine hd tomorrow  trend bmp  anemia in ckd- hgb above goal- no need for shiv at this time

## 2020-01-27 NOTE — DIETITIAN INITIAL EVALUATION ADULT. - 30 CAL TO
Pt reports bilateral knee and foot pain and swelling to left wrist X1 month. Pt denies any recent falls or trauma. Pt ambulated to room. Pt took tylenol this AM with no relief.     Patient identifiers verified and correct for Sunitha Pillai.    LOC: The patient is awake, alert and aware of environment with an appropriate affect, the patient is oriented x 3 and speaking appropriately.  APPEARANCE: Patient resting comfortably and in no acute distress, patient is clean and well groomed, patient's clothing is properly fastened.  SKIN: The skin is warm and dry, color consistent with ethnicity, patient has normal skin turgor and moist mucus membranes, skin intact, no breakdown or bruising noted.  MUSCULOSKELETAL: Patient moving all extremities spontaneously, no obvious deformities noted. Swelling to left wrist.   RESPIRATORY: Airway is open and patent, respirations are spontaneous, patient has a normal effort and rate, no accessory muscle use noted  CARDIAC: Patient has a normal rate and regular rhythm, no periphreal edema noted, capillary refill < 3 seconds.  ABDOMEN: Soft and non tender to palpation, no distention noted, normoactive bowel sounds present in all four quadrants.  NEUROLOGIC: PERRL, 3mm bilaterally, eyes open spontaneously, behavior appropriate to situation, follows commands, facial expression symmetrical, bilateral hand grasp equal and even, purposeful motor response noted, normal sensation in all extremities when touched with a finger.   [id number]

## 2020-01-27 NOTE — DIETITIAN INITIAL EVALUATION ADULT. - PROBLEM SELECTOR PLAN 6
hypoxia and volume overload, possbly 2/2 ESRD   no electrlyte abnormalities, does not require urgent HD overnight   Plan for HD tomorrow. Renal consulted overnight.

## 2020-01-27 NOTE — DIETITIAN INITIAL EVALUATION ADULT. - OTHER INFO
Pt with no GI complaints at this time.  Last BM 1/23, per chart pt with distended stomach.  NFKA. Pt denies any chewing/swallowing issues.     PTA, pt reports no recent changes in eating habits.  Since admission, pt reporting good PO intake, states he consumed 100% of trays.  RN documentation shows good PO intake, 100% of trays consumed. Pt with HgA1c of 8.2% suggesting poor glycemic control over time.  Pt states he is on insulin at home but does not watch carbohydrate intake. Reports checking fingersticks twice a day with values between 140-150.   Pt denies any recent weight loss, but states his clothes do feel a bit loose recently.  Reports usual dry weight of 176 pounds and pre-HD weight of 180 pounds, noted most recent post-dialysis weight of 185.1 pounds via bed scale (1/25).  Per previous RD note from 9/25/19, pt with usual dry weight of 180 pounds.  Pt states he follows up with an RD at his dialysis center for education.    Reviewed T2DM and HD nutrition education with patient.  Educated pt on increased protein needs while on HD, and possible need for restriction of sodium, potassium and phosphorus.  Discussed consistent carbohydrate eating pattern and the importance of eating a balanced diet with protein and carbohydrates at meals.  Pt accepting of T2DM Nutrition Therapy handout.

## 2020-01-27 NOTE — DIETITIAN INITIAL EVALUATION ADULT. - ADD RECOMMEND
1. Continue with diet as ordered. 2. Continue to encourage PO intake and will update food preferences as needed.  3. Provided T2DM and HD diet education, literature left at bedside.  4. Will continue to monitor PO intake, labs, weights, BM, skin, clinical course.

## 2020-01-27 NOTE — PROGRESS NOTE ADULT - PROBLEM SELECTOR PLAN 1
- Likely multifactorial from parainfluenza, pleural effusion and from volume   overload  from new decompensated acute diastolic CHF (BNP 36,000 and CAD),   likely due to ESRD related volume overload. Improving with extra HD  - Acute respiratory failure with hypoxia (88% RA), not on home O2; now on   supplemental oxygen- titrate down as tolerated.  -will try to wean to RA today. Improving

## 2020-01-27 NOTE — PROGRESS NOTE ADULT - SUBJECTIVE AND OBJECTIVE BOX
[facility] Division of Hospital Medicine  Amirah Flores MD  Pager (M-F, 1C-5P): [phone]  Other Times:  [phone]    Patient is a 69y old  Male who presents with a chief complaint of lethargy, coughing, SOB (27 Jan 2020 13:04)      SUBJECTIVE / OVERNIGHT EVENTS:      feeling well. no specific complaints. does not feel out of breath    ADDITIONAL REVIEW OF SYSTEMS:    MEDICATIONS  (STANDING):  aMIOdarone    Tablet 200 milliGRAM(s) Oral daily  aspirin enteric coated 81 milliGRAM(s) Oral daily  calcium acetate 667 milliGRAM(s) Oral three times a day with meals  dextrose 5%. 1000 milliLiter(s) (50 mL/Hr) IV Continuous <Continuous>  dextrose 50% Injectable 12.5 Gram(s) IV Push once  dextrose 50% Injectable 25 Gram(s) IV Push once  dextrose 50% Injectable 25 Gram(s) IV Push once  fluticasone propionate 50 MICROgram(s)/spray Nasal Spray 1 Spray(s) Both Nostrils two times a day  insulin glargine Injectable (LANTUS) 8 Unit(s) SubCutaneous at bedtime  insulin lispro (HumaLOG) corrective regimen sliding scale   SubCutaneous three times a day before meals  insulin lispro (HumaLOG) corrective regimen sliding scale   SubCutaneous at bedtime  insulin lispro Injectable (HumaLOG) 5 Unit(s) SubCutaneous three times a day before meals  ketotifen 0.025% Ophthalmic Solution 1 Drop(s) Both EYES daily  methimazole 5 milliGRAM(s) Oral daily  metoprolol succinate  milliGRAM(s) Oral daily  pantoprazole    Tablet 40 milliGRAM(s) Oral before breakfast  simvastatin 40 milliGRAM(s) Oral at bedtime  sodium chloride 0.65% Nasal 1 Spray(s) Both Nostrils two times a day  tiotropium 18 MICROgram(s) Capsule 1 Capsule(s) Inhalation daily  warfarin 3 milliGRAM(s) Oral once    MEDICATIONS  (PRN):  acetaminophen   Tablet .. 650 milliGRAM(s) Oral every 6 hours PRN Temp greater or equal to 38C (100.4F), Mild Pain (1 - 3)  albuterol/ipratropium for Nebulization. 3 milliLiter(s) Nebulizer every 6 hours PRN Shortness of Breath and/or Wheezing  dextrose 40% Gel 15 Gram(s) Oral once PRN Blood Glucose LESS THAN 70 milliGRAM(s)/deciliter  glucagon  Injectable 1 milliGRAM(s) IntraMuscular once PRN Glucose LESS THAN 70 milligrams/deciliter  guaiFENesin   Syrup  (Sugar-Free) 100 milliGRAM(s) Oral every 6 hours PRN Cough      CAPILLARY BLOOD GLUCOSE      POCT Blood Glucose.: 82 mg/dL (27 Jan 2020 17:20)  POCT Blood Glucose.: 186 mg/dL (27 Jan 2020 12:23)  POCT Blood Glucose.: 117 mg/dL (27 Jan 2020 08:11)  POCT Blood Glucose.: 121 mg/dL (26 Jan 2020 22:09)    I&O's Summary    26 Jan 2020 07:01  -  27 Jan 2020 07:00  --------------------------------------------------------  IN: 240 mL / OUT: 0 mL / NET: 240 mL    27 Jan 2020 07:01  -  27 Jan 2020 19:05  --------------------------------------------------------  IN: 240 mL / OUT: 0 mL / NET: 240 mL        PHYSICAL EXAM:  Vital Signs Last 24 Hrs  T(C): 36.8 (27 Jan 2020 13:52), Max: 36.9 (27 Jan 2020 04:51)  T(F): 98.3 (27 Jan 2020 13:52), Max: 98.4 (27 Jan 2020 04:51)  HR: 69 (27 Jan 2020 13:52) (69 - 74)  BP: 116/65 (27 Jan 2020 13:52) (114/68 - 130/76)  BP(mean): --  RR: 18 (27 Jan 2020 13:52) (18 - 18)  SpO2: 98% (27 Jan 2020 13:52) (95% - 100%)  CONSTITUTIONAL: NAD, well-developed, well-groomed  EYES: PERRLA; conjunctiva and sclera clear  ENMT: Moist oral mucosa, no pharyngeal injection or exudates; normal dentition  NECK: Supple, no palpable masses; no thyromegaly  RESPIRATORY: Normal respiratory effort; lungs are clear to auscultation bilaterally  CARDIOVASCULAR: Regular rate and rhythm, normal S1 and S2, no murmur/rub/gallop; No lower extremity edema; Peripheral pulses are 2+ bilaterally  ABDOMEN: Nontender to palpation, normoactive bowel sounds, no rebound/guarding; No hepatosplenomegaly      LABS:                        13.3   8.28  )-----------( 150      ( 27 Jan 2020 05:51 )             43.4     01-26    133<L>  |  93<L>  |  33<H>  ----------------------------<  149<H>  4.4   |  24  |  5.47<H>    Ca    9.1      26 Jan 2020 06:46      PT/INR - ( 26 Jan 2020 06:46 )   PT: 18.7 sec;   INR: 1.62 ratio         PTT - ( 26 Jan 2020 06:46 )  PTT:30.8 sec          COORDINATION OF CARE:  Care Discussed with Consultants/Other Providers [Y/N]: yes  Prior or Outpatient Records Reviewed [Y/N]: yes

## 2020-01-27 NOTE — DIETITIAN INITIAL EVALUATION ADULT. - REASON INDICATOR FOR ASSESSMENT
Pt seen for length of stay in PICU.  Information obtained from pt and EMR.  Per chart: Pt is a "69 y.o Male w CAD s/p multiple stents, recently s/p CABG (9/2019 course c/b Afib and LT pleural effusion), HTN, DM2, Afib on coumadin and ESRD on HD (Tu, Thurs, Sat) presenting from home with cough and SOB."

## 2020-01-27 NOTE — PROGRESS NOTE ADULT - SUBJECTIVE AND OBJECTIVE BOX
S: Denies SOB on O2. Denies chest pain. Review of systems otherwise (-)      MEDICATIONS  (STANDING):  aMIOdarone    Tablet 200 milliGRAM(s) Oral daily  aspirin enteric coated 81 milliGRAM(s) Oral daily  calcium acetate 667 milliGRAM(s) Oral three times a day with meals  dextrose 5%. 1000 milliLiter(s) (50 mL/Hr) IV Continuous <Continuous>  dextrose 50% Injectable 12.5 Gram(s) IV Push once  dextrose 50% Injectable 25 Gram(s) IV Push once  dextrose 50% Injectable 25 Gram(s) IV Push once  fluticasone propionate 50 MICROgram(s)/spray Nasal Spray 1 Spray(s) Both Nostrils two times a day  insulin glargine Injectable (LANTUS) 8 Unit(s) SubCutaneous at bedtime  insulin lispro (HumaLOG) corrective regimen sliding scale   SubCutaneous three times a day before meals  insulin lispro (HumaLOG) corrective regimen sliding scale   SubCutaneous at bedtime  insulin lispro Injectable (HumaLOG) 5 Unit(s) SubCutaneous three times a day before meals  ketotifen 0.025% Ophthalmic Solution 1 Drop(s) Both EYES daily  methimazole 5 milliGRAM(s) Oral daily  metoprolol succinate  milliGRAM(s) Oral daily  pantoprazole    Tablet 40 milliGRAM(s) Oral before breakfast  simvastatin 40 milliGRAM(s) Oral at bedtime  sodium chloride 0.65% Nasal 1 Spray(s) Both Nostrils two times a day  tiotropium 18 MICROgram(s) Capsule 1 Capsule(s) Inhalation daily    MEDICATIONS  (PRN):  acetaminophen   Tablet .. 650 milliGRAM(s) Oral every 6 hours PRN Temp greater or equal to 38C (100.4F), Mild Pain (1 - 3)  albuterol/ipratropium for Nebulization. 3 milliLiter(s) Nebulizer every 6 hours PRN Shortness of Breath and/or Wheezing  dextrose 40% Gel 15 Gram(s) Oral once PRN Blood Glucose LESS THAN 70 milliGRAM(s)/deciliter  glucagon  Injectable 1 milliGRAM(s) IntraMuscular once PRN Glucose LESS THAN 70 milligrams/deciliter  guaiFENesin   Syrup  (Sugar-Free) 100 milliGRAM(s) Oral every 6 hours PRN Cough      LABS:                            13.3   8.28  )-----------( 150      ( 27 Jan 2020 05:51 )             43.4     Hemoglobin: 13.3 g/dL (01-27 @ 05:51)  Hemoglobin: 13.0 g/dL (01-26 @ 06:46)  Hemoglobin: 13.0 g/dL (01-25 @ 06:55)  Hemoglobin: 13.1 g/dL (01-24 @ 08:08)  Hemoglobin: 12.9 g/dL (01-24 @ 00:30)    01-26    133<L>  |  93<L>  |  33<H>  ----------------------------<  149<H>  4.4   |  24  |  5.47<H>    Ca    9.1      26 Jan 2020 06:46      Creatinine Trend: 5.47<--, 7.12<--, 4.92<--, 5.66<--, 6.74<--, 9.07<--             01-26-20 @ 07:01 - 01-27-20 @ 07:00  --------------------------------------------------------  IN: 240 mL / OUT: 0 mL / NET: 240 mL    01-27-20 @ 07:01  -  01-27-20 @ 13:06  --------------------------------------------------------  IN: 240 mL / OUT: 0 mL / NET: 240 mL        PHYSICAL EXAM  Vital Signs Last 24 Hrs  T(C): 36.9 (27 Jan 2020 04:51), Max: 36.9 (26 Jan 2020 14:15)  T(F): 98.4 (27 Jan 2020 04:51), Max: 98.4 (26 Jan 2020 14:15)  HR: 70 (27 Jan 2020 12:47) (70 - 74)  BP: 130/76 (27 Jan 2020 04:51) (101/- - 130/76)  BP(mean): --  RR: 18 (27 Jan 2020 12:47) (18 - 18)  SpO2: 99% (27 Jan 2020 12:47) (88% - 100%)      Gen: Appears well in NAD  HEENT:  (-)icterus (-)pallor  CV: N S1 S2 1/6 DEJAH (+)2 Pulses B/l  Resp: CTA B/L, normal effort  GI: (+) BS Soft, NT, ND  Lymph:  (+) Trace LE Edema, (-)obvious lymphadenopathy  Skin: Warm to touch, Normal turgor  Psych: Appropriate mood and affect    TELE: AF 60-90    ASSESSMENT/PLAN: 68 y/o male well known to our office (Cardiologist - Dr. Méndez) with CAD s/p multiple stents and recent CABG in 9/2019 with course c/b Afib on coumadin, HTN, HLD, DM2, and ESRD on HD presents with lethargy, cough/SOB, LE edema and reported syncopal episode 1 week ago found to be volume overloaded with +parainfluenza and episode of syncope one week ago.     - CXR noted with mild improvement in L pleural effusion  - Wean O2 as tolerated  - Continue fluid removal with HD per renal  - TTE with normal LV function but with RVE with decreased RV function, Reviewed the echo, RV size is borderline enlarged but likely to secondary to volume status and loading conditions.  Doubt he truly has stage III diastolic dysfunction as his LA size is normal.  - Admission CT negative for PE - Pulm Eval noted  - Recommend lifelong ac with coumadin if no contraindications.  His INR was subtherapeutic but he is not being bridged with heparin gtt due to epistaxis - check INR today  - No plans for RHC, Plan to repeat echo in office once euvolemic  - No further inpatient cardiac w/u  - Patient to f/u with Dr. Méndez on Tuesday 2/11 at 10:30 AM    Wilfred Robin PA-C  [facility]  Pager: [phone]

## 2020-01-27 NOTE — DIETITIAN INITIAL EVALUATION ADULT. - PROBLEM SELECTOR PLAN 9
check A1c   Patient on lantus 14 and humalog 5 tid - will continue while admitted  low insulin sliding scale   monitor FS ac and hs

## 2020-01-27 NOTE — DIETITIAN INITIAL EVALUATION ADULT. - PROBLEM SELECTOR PLAN 8
Atrial fibrillation since CABG; currently remain in Afib, rate controlled   c/w metoprolol and amiodarone   INR subtherapeutic 1.74; dose coumadin tonight   monitor INR daily and dose coumadin as appropriate

## 2020-01-27 NOTE — PROGRESS NOTE ADULT - SUBJECTIVE AND OBJECTIVE BOX
[facility] - NII Knight / NII Mendiola / JASWANT Arroyo/ NII Mackey/ NII Macias/ BRIGITTE Esposito / ARAMIS Calle / LUDA Bowen  ---------------------------------------------------------------------------------------------------------------  seen and examined today for ESRD on HD  Interval :NAD  VITALS:  T(F): 98.4 (01-27-20 @ 04:51), Max: 98.4 (01-26-20 @ 14:15)  HR: 72 (01-27-20 @ 04:51)  BP: 130/76 (01-27-20 @ 04:51)  RR: 18 (01-27-20 @ 04:51)  SpO2: 99% (01-27-20 @ 04:51)  Wt(kg): --    01-26 @ 07:01  -  01-27 @ 07:00  --------------------------------------------------------  IN: 240 mL / OUT: 0 mL / NET: 240 mL    01-27 @ 07:01  -  01-27 @ 11:50  --------------------------------------------------------  IN: 240 mL / OUT: 0 mL / NET: 240 mL      Physical Exam :-  Constitutional: NAD  Neck: Supple.  Respiratory: Bilateral equal breath sounds,  Cardiovascular: S1, S2 normal,  Gastrointestinal: Bowel Sounds present, soft, non tender.  Extremities: No edema  Neurological: Alert and Oriented x 3, no focal deficits  Psychiatric: Normal mood, normal affect  Data:-  Allergies :   lisinopril (Other)    Hospital Medications:   MEDICATIONS  (STANDING):  aMIOdarone    Tablet 200 milliGRAM(s) Oral daily  aspirin enteric coated 81 milliGRAM(s) Oral daily  calcium acetate 667 milliGRAM(s) Oral three times a day with meals  dextrose 5%. 1000 milliLiter(s) (50 mL/Hr) IV Continuous <Continuous>  dextrose 50% Injectable 12.5 Gram(s) IV Push once  dextrose 50% Injectable 25 Gram(s) IV Push once  dextrose 50% Injectable 25 Gram(s) IV Push once  fluticasone propionate 50 MICROgram(s)/spray Nasal Spray 1 Spray(s) Both Nostrils two times a day  insulin glargine Injectable (LANTUS) 8 Unit(s) SubCutaneous at bedtime  insulin lispro (HumaLOG) corrective regimen sliding scale   SubCutaneous three times a day before meals  insulin lispro (HumaLOG) corrective regimen sliding scale   SubCutaneous at bedtime  insulin lispro Injectable (HumaLOG) 5 Unit(s) SubCutaneous three times a day before meals  ketotifen 0.025% Ophthalmic Solution 1 Drop(s) Both EYES daily  methimazole 5 milliGRAM(s) Oral daily  metoprolol succinate  milliGRAM(s) Oral daily  pantoprazole    Tablet 40 milliGRAM(s) Oral before breakfast  simvastatin 40 milliGRAM(s) Oral at bedtime  sodium chloride 0.65% Nasal 1 Spray(s) Both Nostrils two times a day  tiotropium 18 MICROgram(s) Capsule 1 Capsule(s) Inhalation daily    01-26    133<L>  |  93<L>  |  33<H>  ----------------------------<  149<H>  4.4   |  24  |  5.47<H>    Ca    9.1      26 Jan 2020 06:46      Creatinine Trend: 5.47 <--, 7.12 <--, 4.92 <--, 5.66 <--, 6.74 <--, 9.07 <--, 8.36 <--                        13.3   8.28  )-----------( 150      ( 27 Jan 2020 05:51 )             43.4

## 2020-01-27 NOTE — DIETITIAN INITIAL EVALUATION ADULT. - PERTINENT LABORATORY DATA
(1/26) Na: 133; BUN: 33; Cr: 5.47, (1/21) HgA1c: 8.2%  CAPILLARY BLOOD GLUCOSE  POCT Blood Glucose.: 186 mg/dL (27 Jan 2020 12:23)  POCT Blood Glucose.: 117 mg/dL (27 Jan 2020 08:11)  POCT Blood Glucose.: 121 mg/dL (26 Jan 2020 22:09)  POCT Blood Glucose.: 100 mg/dL (26 Jan 2020 17:27)

## 2020-01-27 NOTE — DIETITIAN INITIAL EVALUATION ADULT. - PROBLEM SELECTOR PLAN 3
TTE in 2019 showed normal LV function, mild diastolic dysfunction; pt with worsening SOB and pedal edema and orthopnea   will recheck TTE  no role for diuresis given anuria   fluid removal per HD  monitor daily weights   - Continue home BB.

## 2020-01-27 NOTE — DIETITIAN INITIAL EVALUATION ADULT. - PROBLEM SELECTOR PLAN 4
pt with syncopal episode one week prior to admission  wife who witnessed fall states no head strike; however, given pt on coumadin, will check CT head   monitor on tele   trop downtrending, no acute ischemic changes, RBBB on EKG noted on prior EKGs   check TTE

## 2020-01-28 ENCOUNTER — TRANSCRIPTION ENCOUNTER (OUTPATIENT)
Age: 70
End: 2020-01-28

## 2020-01-28 VITALS
SYSTOLIC BLOOD PRESSURE: 112 MMHG | DIASTOLIC BLOOD PRESSURE: 72 MMHG | HEART RATE: 79 BPM | OXYGEN SATURATION: 96 % | RESPIRATION RATE: 18 BRPM

## 2020-01-28 LAB
ANION GAP SERPL CALC-SCNC: 23 MMOL/L — HIGH (ref 5–17)
BUN SERPL-MCNC: 89 MG/DL — HIGH (ref 7–23)
CALCIUM SERPL-MCNC: 9.1 MG/DL — SIGNIFICANT CHANGE UP (ref 8.4–10.5)
CHLORIDE SERPL-SCNC: 93 MMOL/L — LOW (ref 96–108)
CO2 SERPL-SCNC: 20 MMOL/L — LOW (ref 22–31)
CREAT SERPL-MCNC: 9.67 MG/DL — HIGH (ref 0.5–1.3)
GLUCOSE BLDC GLUCOMTR-MCNC: 104 MG/DL — HIGH (ref 70–99)
GLUCOSE BLDC GLUCOMTR-MCNC: 66 MG/DL — LOW (ref 70–99)
GLUCOSE BLDC GLUCOMTR-MCNC: 75 MG/DL — SIGNIFICANT CHANGE UP (ref 70–99)
GLUCOSE SERPL-MCNC: 94 MG/DL — SIGNIFICANT CHANGE UP (ref 70–99)
HCT VFR BLD CALC: 43.7 % — SIGNIFICANT CHANGE UP (ref 39–50)
HGB BLD-MCNC: 13.3 G/DL — SIGNIFICANT CHANGE UP (ref 13–17)
INR BLD: 1.61 RATIO — HIGH (ref 0.88–1.16)
MCHC RBC-ENTMCNC: 27.7 PG — SIGNIFICANT CHANGE UP (ref 27–34)
MCHC RBC-ENTMCNC: 30.4 GM/DL — LOW (ref 32–36)
MCV RBC AUTO: 91 FL — SIGNIFICANT CHANGE UP (ref 80–100)
NRBC # BLD: 0 /100 WBCS — SIGNIFICANT CHANGE UP (ref 0–0)
PLATELET # BLD AUTO: 154 K/UL — SIGNIFICANT CHANGE UP (ref 150–400)
POTASSIUM SERPL-MCNC: 5.4 MMOL/L — HIGH (ref 3.5–5.3)
POTASSIUM SERPL-SCNC: 5.4 MMOL/L — HIGH (ref 3.5–5.3)
PROTHROM AB SERPL-ACNC: 18.6 SEC — HIGH (ref 10–12.9)
RBC # BLD: 4.8 M/UL — SIGNIFICANT CHANGE UP (ref 4.2–5.8)
RBC # FLD: 15.7 % — HIGH (ref 10.3–14.5)
SODIUM SERPL-SCNC: 136 MMOL/L — SIGNIFICANT CHANGE UP (ref 135–145)
WBC # BLD: 7.95 K/UL — SIGNIFICANT CHANGE UP (ref 3.8–10.5)
WBC # FLD AUTO: 7.95 K/UL — SIGNIFICANT CHANGE UP (ref 3.8–10.5)

## 2020-01-28 PROCEDURE — 80048 BASIC METABOLIC PNL TOTAL CA: CPT

## 2020-01-28 PROCEDURE — 99261: CPT

## 2020-01-28 PROCEDURE — 71045 X-RAY EXAM CHEST 1 VIEW: CPT

## 2020-01-28 PROCEDURE — 87798 DETECT AGENT NOS DNA AMP: CPT

## 2020-01-28 PROCEDURE — 94640 AIRWAY INHALATION TREATMENT: CPT

## 2020-01-28 PROCEDURE — 83880 ASSAY OF NATRIURETIC PEPTIDE: CPT

## 2020-01-28 PROCEDURE — 70450 CT HEAD/BRAIN W/O DYE: CPT

## 2020-01-28 PROCEDURE — 82803 BLOOD GASES ANY COMBINATION: CPT

## 2020-01-28 PROCEDURE — 71275 CT ANGIOGRAPHY CHEST: CPT

## 2020-01-28 PROCEDURE — 87633 RESP VIRUS 12-25 TARGETS: CPT

## 2020-01-28 PROCEDURE — 85027 COMPLETE CBC AUTOMATED: CPT

## 2020-01-28 PROCEDURE — 80053 COMPREHEN METABOLIC PANEL: CPT

## 2020-01-28 PROCEDURE — 99285 EMERGENCY DEPT VISIT HI MDM: CPT | Mod: 25

## 2020-01-28 PROCEDURE — 36415 COLL VENOUS BLD VENIPUNCTURE: CPT

## 2020-01-28 PROCEDURE — 99239 HOSP IP/OBS DSCHRG MGMT >30: CPT

## 2020-01-28 PROCEDURE — 83036 HEMOGLOBIN GLYCOSYLATED A1C: CPT

## 2020-01-28 PROCEDURE — 84100 ASSAY OF PHOSPHORUS: CPT

## 2020-01-28 PROCEDURE — 85730 THROMBOPLASTIN TIME PARTIAL: CPT

## 2020-01-28 PROCEDURE — 82962 GLUCOSE BLOOD TEST: CPT

## 2020-01-28 PROCEDURE — 82553 CREATINE MB FRACTION: CPT

## 2020-01-28 PROCEDURE — 83605 ASSAY OF LACTIC ACID: CPT

## 2020-01-28 PROCEDURE — 85610 PROTHROMBIN TIME: CPT

## 2020-01-28 PROCEDURE — 87486 CHLMYD PNEUM DNA AMP PROBE: CPT

## 2020-01-28 PROCEDURE — 83735 ASSAY OF MAGNESIUM: CPT

## 2020-01-28 PROCEDURE — 70551 MRI BRAIN STEM W/O DYE: CPT

## 2020-01-28 PROCEDURE — 84484 ASSAY OF TROPONIN QUANT: CPT

## 2020-01-28 PROCEDURE — 93306 TTE W/DOPPLER COMPLETE: CPT

## 2020-01-28 PROCEDURE — 93005 ELECTROCARDIOGRAM TRACING: CPT

## 2020-01-28 PROCEDURE — 71046 X-RAY EXAM CHEST 2 VIEWS: CPT

## 2020-01-28 PROCEDURE — 87581 M.PNEUMON DNA AMP PROBE: CPT

## 2020-01-28 RX ADMIN — Medication 5 UNIT(S): at 08:37

## 2020-01-28 RX ADMIN — PANTOPRAZOLE SODIUM 40 MILLIGRAM(S): 20 TABLET, DELAYED RELEASE ORAL at 05:14

## 2020-01-28 RX ADMIN — TIOTROPIUM BROMIDE 1 CAPSULE(S): 18 CAPSULE ORAL; RESPIRATORY (INHALATION) at 14:21

## 2020-01-28 RX ADMIN — Medication 1 SPRAY(S): at 05:14

## 2020-01-28 RX ADMIN — Medication 667 MILLIGRAM(S): at 08:37

## 2020-01-28 RX ADMIN — Medication 100 MILLIGRAM(S): at 05:14

## 2020-01-28 RX ADMIN — Medication 81 MILLIGRAM(S): at 14:21

## 2020-01-28 RX ADMIN — Medication 667 MILLIGRAM(S): at 14:21

## 2020-01-28 RX ADMIN — AMIODARONE HYDROCHLORIDE 200 MILLIGRAM(S): 400 TABLET ORAL at 05:14

## 2020-01-28 RX ADMIN — Medication 1 SPRAY(S): at 05:15

## 2020-01-28 RX ADMIN — KETOTIFEN FUMARATE 1 DROP(S): 0.34 SOLUTION OPHTHALMIC at 14:21

## 2020-01-28 NOTE — DISCHARGE NOTE PROVIDER - CARE PROVIDERS DIRECT ADDRESSES
,DirectAddress_Unknown ,DirectAddress_Unknown,[email or web address]

## 2020-01-28 NOTE — PROGRESS NOTE ADULT - PROBLEM SELECTOR PLAN 1
- Likely multifactorial from parainfluenza, pleural effusion and from volume   overload  from new decompensated acute diastolic CHF (BNP 36,000 and CAD),   likely due to ESRD related volume overload. Improving with extra HD  - Acute respiratory failure with hypoxia (88% RA), not on home O2; now on room air have been able to wean.

## 2020-01-28 NOTE — DISCHARGE NOTE PROVIDER - NSDCFUSCHEDAPPT_GEN_ALL_CORE_FT
VIOLETTA RENTERIA ; 02/10/2020 ; [facility] [address] VIOLETTA Diop ; 02/10/2020 ; [facility] [address] VILOETTA RENTERIA ; 02/10/2020 ; [facility] [address] ; 02/10/2020 ; [facility] [address]

## 2020-01-28 NOTE — PROGRESS NOTE ADULT - SUBJECTIVE AND OBJECTIVE BOX
Patient seen and examined  no complaints    lisinopril (Other)    Hospital Medications:   MEDICATIONS  (STANDING):  aMIOdarone    Tablet 200 milliGRAM(s) Oral daily  aspirin enteric coated 81 milliGRAM(s) Oral daily  calcium acetate 667 milliGRAM(s) Oral three times a day with meals  dextrose 5%. 1000 milliLiter(s) (50 mL/Hr) IV Continuous <Continuous>  dextrose 50% Injectable 12.5 Gram(s) IV Push once  dextrose 50% Injectable 25 Gram(s) IV Push once  dextrose 50% Injectable 25 Gram(s) IV Push once  fluticasone propionate 50 MICROgram(s)/spray Nasal Spray 1 Spray(s) Both Nostrils two times a day  insulin glargine Injectable (LANTUS) 8 Unit(s) SubCutaneous at bedtime  insulin lispro (HumaLOG) corrective regimen sliding scale   SubCutaneous three times a day before meals  insulin lispro (HumaLOG) corrective regimen sliding scale   SubCutaneous at bedtime  insulin lispro Injectable (HumaLOG) 5 Unit(s) SubCutaneous three times a day before meals  ketotifen 0.025% Ophthalmic Solution 1 Drop(s) Both EYES daily  methimazole 5 milliGRAM(s) Oral daily  metoprolol succinate  milliGRAM(s) Oral daily  pantoprazole    Tablet 40 milliGRAM(s) Oral before breakfast  simvastatin 40 milliGRAM(s) Oral at bedtime  sodium chloride 0.65% Nasal 1 Spray(s) Both Nostrils two times a day  tiotropium 18 MICROgram(s) Capsule 1 Capsule(s) Inhalation daily        VITALS:  T(F): 98.1 (01-28-20 @ 09:30), Max: 98.2 (01-27-20 @ 21:20)  HR: 90 (01-28-20 @ 09:30)  BP: 121/79 (01-28-20 @ 09:30)  RR: 18 (01-28-20 @ 09:30)  SpO2: 90% (01-28-20 @ 09:30)  Wt(kg): --    01-27 @ 07:01  -  01-28 @ 07:00  --------------------------------------------------------  IN: 480 mL / OUT: 0 mL / NET: 480 mL      Physical Exam :-  Constitutional: NAD  Neck: Supple.  Respiratory: Bilateral equal breath sounds,  Cardiovascular: S1, S2 normal,  Gastrointestinal: Bowel Sounds present, soft, non tender.  Extremities: No edema  Neurological: Alert and Oriented x 3, no focal deficits  Psychiatric: Normal mood, normal affect    LABS:  01-28    136  |  93<L>  |  89<H>  ----------------------------<  94  5.4<H>   |  20<L>  |  9.67<H>    Ca    9.1      28 Jan 2020 06:08      Creatinine Trend: 9.67 <--, 5.47 <--, 7.12 <--, 4.92 <--, 5.66 <--, 6.74 <--                        13.3   7.95  )-----------( 154      ( 28 Jan 2020 06:09 )             43.7     Urine Studies:      RADIOLOGY & ADDITIONAL STUDIES:

## 2020-01-28 NOTE — PROGRESS NOTE ADULT - PROBLEM SELECTOR PROBLEM 5
Parainfluenza virus infection

## 2020-01-28 NOTE — PROGRESS NOTE ADULT - REASON FOR ADMISSION
lethargy, coughing, SOB

## 2020-01-28 NOTE — PROGRESS NOTE ADULT - SUBJECTIVE AND OBJECTIVE BOX
[facility] Division of Hospital Medicine  Amirah Flores MD  Pager (M-F, 0P-5M): [phone]  Other Times:  [phone]    Patient is a 69y old  Male who presents with a chief complaint of lethargy, coughing, SOB (28 Jan 2020 14:02)      SUBJECTIVE / OVERNIGHT EVENTS:    no overnight events- no specific complaints today. ready for dc.     ADDITIONAL REVIEW OF SYSTEMS:    MEDICATIONS  (STANDING):  aMIOdarone    Tablet 200 milliGRAM(s) Oral daily  aspirin enteric coated 81 milliGRAM(s) Oral daily  calcium acetate 667 milliGRAM(s) Oral three times a day with meals  dextrose 5%. 1000 milliLiter(s) (50 mL/Hr) IV Continuous <Continuous>  dextrose 50% Injectable 12.5 Gram(s) IV Push once  dextrose 50% Injectable 25 Gram(s) IV Push once  dextrose 50% Injectable 25 Gram(s) IV Push once  fluticasone propionate 50 MICROgram(s)/spray Nasal Spray 1 Spray(s) Both Nostrils two times a day  insulin glargine Injectable (LANTUS) 8 Unit(s) SubCutaneous at bedtime  insulin lispro (HumaLOG) corrective regimen sliding scale   SubCutaneous three times a day before meals  insulin lispro (HumaLOG) corrective regimen sliding scale   SubCutaneous at bedtime  insulin lispro Injectable (HumaLOG) 5 Unit(s) SubCutaneous three times a day before meals  ketotifen 0.025% Ophthalmic Solution 1 Drop(s) Both EYES daily  methimazole 5 milliGRAM(s) Oral daily  metoprolol succinate  milliGRAM(s) Oral daily  pantoprazole    Tablet 40 milliGRAM(s) Oral before breakfast  simvastatin 40 milliGRAM(s) Oral at bedtime  sodium chloride 0.65% Nasal 1 Spray(s) Both Nostrils two times a day  tiotropium 18 MICROgram(s) Capsule 1 Capsule(s) Inhalation daily    MEDICATIONS  (PRN):  acetaminophen   Tablet .. 650 milliGRAM(s) Oral every 6 hours PRN Temp greater or equal to 38C (100.4F), Mild Pain (1 - 3)  albuterol/ipratropium for Nebulization. 3 milliLiter(s) Nebulizer every 6 hours PRN Shortness of Breath and/or Wheezing  dextrose 40% Gel 15 Gram(s) Oral once PRN Blood Glucose LESS THAN 70 milliGRAM(s)/deciliter  glucagon  Injectable 1 milliGRAM(s) IntraMuscular once PRN Glucose LESS THAN 70 milligrams/deciliter  guaiFENesin   Syrup  (Sugar-Free) 100 milliGRAM(s) Oral every 6 hours PRN Cough      CAPILLARY BLOOD GLUCOSE      POCT Blood Glucose.: 75 mg/dL (28 Jan 2020 14:23)  POCT Blood Glucose.: 66 mg/dL (28 Jan 2020 14:17)  POCT Blood Glucose.: 104 mg/dL (28 Jan 2020 08:08)  POCT Blood Glucose.: 124 mg/dL (27 Jan 2020 21:25)  POCT Blood Glucose.: 82 mg/dL (27 Jan 2020 17:20)    I&O's Summary    27 Jan 2020 07:01  -  28 Jan 2020 07:00  --------------------------------------------------------  IN: 480 mL / OUT: 0 mL / NET: 480 mL    28 Jan 2020 07:01  -  28 Jan 2020 14:40  --------------------------------------------------------  IN: 0 mL / OUT: 2300 mL / NET: -2300 mL        PHYSICAL EXAM:  Vital Signs Last 24 Hrs  T(C): 36.4 (28 Jan 2020 13:30), Max: 36.8 (27 Jan 2020 21:20)  T(F): 97.6 (28 Jan 2020 13:30), Max: 98.2 (27 Jan 2020 21:20)  HR: 79 (28 Jan 2020 14:22) (66 - 90)  BP: 112/72 (28 Jan 2020 14:22) (112/72 - 142/73)  BP(mean): --  RR: 18 (28 Jan 2020 14:22) (16 - 18)  SpO2: 96% (28 Jan 2020 14:22) (90% - 99%)  EYES: PERRLA; conjunctiva and sclera clear  ENMT: Moist oral mucosa, no pharyngeal injection or exudates; normal dentition  NECK: Supple, no palpable masses; no thyromegaly  RESPIRATORY: Normal respiratory effort; lungs are clear to auscultation bilaterally  CARDIOVASCULAR: Regular rate and rhythm, normal S1 and S2, no murmur/rub/gallop; No lower extremity edema; Peripheral pulses are 2+ bilaterally  ABDOMEN: Nontender to palpation, normoactive bowel sounds, no rebound/guarding; No hepatosplenomegaly      LABS:                        13.3   7.95  )-----------( 154      ( 28 Jan 2020 06:09 )             43.7     01-28    136  |  93<L>  |  89<H>  ----------------------------<  94  5.4<H>   |  20<L>  |  9.67<H>    Ca    9.1      28 Jan 2020 06:08      PT/INR - ( 28 Jan 2020 06:09 )   PT: 18.6 sec;   INR: 1.61 ratio             COORDINATION OF CARE:  Care Discussed with Consultants/Other Providers [Y/N]: yes  Prior or Outpatient Records Reviewed [Y/N]: yes

## 2020-01-28 NOTE — PROGRESS NOTE ADULT - PROBLEM SELECTOR PROBLEM 3
Pleural effusion on left
R/O CHF exacerbation
R/O CHF exacerbation

## 2020-01-28 NOTE — PROGRESS NOTE ADULT - PROBLEM SELECTOR PROBLEM 8
Atrial fibrillation, persistent

## 2020-01-28 NOTE — PROGRESS NOTE ADULT - PROBLEM SELECTOR PROBLEM 1
Acute respiratory failure with hypoxia
ESRD (end stage renal disease) on dialysis
Acute respiratory failure with hypoxia
Acute respiratory failure with hypoxia

## 2020-01-28 NOTE — PROGRESS NOTE ADULT - PROBLEM SELECTOR PLAN 1
s/p HD today- flowsheet reveiwed-tolerated hd well  trend bmp  anemia in ckd- hgb above goal- no need for shvi at this time

## 2020-01-28 NOTE — PROGRESS NOTE ADULT - PROBLEM SELECTOR PROBLEM 2
Acute on chronic diastolic heart failure
Pleural effusion on left
Pleural effusion on left

## 2020-01-28 NOTE — PROGRESS NOTE ADULT - ASSESSMENT
69M with CAD s/p multiple stents s/p CABG (9/2019), HTN, HLD, DM2, Afib on coumadin and ESRD on HD, presenting from home with increased lethargy and cough found to be parainfluenza positive. Pulmonary consult called for pleural effusion.  - Clinically improved overall  - Continue HD per nephrology  - Cardiology evaluation noted. Agree with repeat TTE when euvolemic and reassessing need for cath based on those results.  - Can change duonebs to Q6H prn. Resume Bevespi. Patient should be be restarted on Spiriva.  - Continue Flonase BID and nasal saline.   - Need outpatient PFTs and sleep study on discharge [phone].    Will follow with you.
69M with CAD s/p multiple stents s/p CABG (9/2019), HTN, HLD, DM2, Afib on coumadin and ESRD on HD, presenting from home with increased lethargy and cough found to be parainfluenza positive. Pulmonary consult called for pleural effusion.  - Clinically much improved today. Continue HD per nephrology.  - Low suspicion PAH as cause of new RV dysfunction but can send off labs as outlined in yesterday's consult note. Will also need PFTs and sleep study as outpatient. Recommend cardiac evaluation for cause of RV dysfunction. Consider R and LHC.  - Continue Duonebs Q6H. Continue to hold Bevespi and Spiriva. Once Duonebs switched to prn, can resume Bevespi but discontinue Spiriva.  - Continue Flonase BID and nasal saline.     Will follow with you.
69M with CAD s/p multiple stents, recently s/p CABG (9/2019 course c/b Afib and LT pleural effusion), HTN, HLD, DM2, Afib on coumadin and ESRD on HD (Tu, Thurs, Sat) presenting from home with increased cough, HENLEY and lethargy, admitted for acute hypoxia, likely multifactorial from parainfluenza, L pleural effusion and volume overload from ESRD.
69y Male w CAD s/p multiple stents, recently s/p CABG (9/2019 course c/b Afib and LT pleural effusion), HTN, DM2, Afib on coumadin and ESRD on HD (Tu, Thurs, Sat) presenting from home with cough and SOB. +parainfluenza. With likely volume excess and pulm edema. Neg PE on CTA
69M with CAD s/p multiple stents, recently s/p CABG (9/2019 course c/b Afib and LT pleural effusion), HTN, HLD, DM2, Afib on coumadin and ESRD on HD (Tu, Thurs, Sat) presenting from home with increased cough, HENLEY and lethargy, admitted for acute hypoxia, likely multifactorial from parainfluenza, L pleural effusion and volume overload from ESRD vs CHF.
69M with CAD s/p multiple stents, recently s/p CABG (9/2019 course c/b Afib and LT pleural effusion), HTN, HLD, DM2, Afib on coumadin and ESRD on HD (Tu, Thurs, Sat) presenting from home with increased cough, HENLEY and lethargy, admitted for acute hypoxia, likely multifactorial from parainfluenza, L pleural effusion and volume overload from ESRD vs CHF.

## 2020-01-28 NOTE — PROGRESS NOTE ADULT - PROBLEM SELECTOR PROBLEM 9
Type 2 diabetes mellitus with hyperglycemia, with long-term current use of insulin

## 2020-01-28 NOTE — DISCHARGE NOTE PROVIDER - NSDCFUADDAPPT_GEN_ALL_CORE_FT
You have follow up with Dr. Méndez on February 11th, 10:30am!    Please make appointment with our lung doctors (pulmonary) after discharge. Information for Dr. Coreas you saw you in hospital is provided

## 2020-01-28 NOTE — DISCHARGE NOTE PROVIDER - NSDCCPCAREPLAN_GEN_ALL_CORE_FT
PRINCIPAL DISCHARGE DIAGNOSIS  Diagnosis: Acute respiratory failure with hypoxia  Assessment and Plan of Treatment: Likely multifactorial from parainfluenza, pleural effusion and from volume   overload  from new decompensated acute diastolic CHF (BNP 36,000 and CAD),   likely due to ESRD related volume overload. Improving with extra HD  - Acute respiratory failure with hypoxia (88% RA), not on home O2; now on   supplemental oxygen- titrate down as tolerated.      SECONDARY DISCHARGE DIAGNOSES  Diagnosis: ESRD on dialysis  Assessment and Plan of Treatment: Avoid taking (NSAIDs) - (ex: Ibuprofen, Advil, Celebrex, Naprosyn)  Avoid taking any nephrotoxic agents (can harm kidneys) - Intravenous contrast for diagnostic testing, combination cold medications.  Have all medications adjusted for your renal function by your Health Care Provider.  Blood pressure control is important.  Take all medication as prescribed.      Diagnosis: CAD (coronary artery disease)  Assessment and Plan of Treatment: Coronary artery disease is a condition where the arteries the supply the heart muscle get clogges with fatty deposits & puts you at risk for a heart attack  Call your doctor if you have any new pain, pressure, or discomfort in the center of your chest, pain, tingling or discomfort in arms, back, neck, jaw, or stomach, shortness of breath, nausea, vomiting, burping or heartburn, sweating, cold and clammy skin, racing or abnormal heartbeat for more than 10 minutes or if they keep coming & going.  Call 911 and do not tr to get to hospital by care  You can help yourself with lefestyle changes (quitting smoking if you Informed pt of the various negative side effects of smoking including risk of COPD, Lung Ca etc  Strongly recommended that pt stops smoking and pt given various options of smoking cessasion tools such as NRT's and other pharmacotherapies), eat lots of fruits & vegetables & low fat dairy products, not a lot of meat & fatty foods, walk or some form of physical activity most days of the week, lose weight if you are overweight  Take your cardiac medication as prescribed to lower cholesterol, to lower blood pressure, aspirin to prevent blood clots, and diabetes control  Make sure to keep appointments with doctor for cardiac follow up care      Diagnosis: Pleural effusion  Assessment and Plan of Treatment: CT with small-mod left lower pleural effusion.  - Pulm and Cardiothoracic surgery eval appreciated - no planned intervention at   this point. The effusion is loculated, and would be challenging to target per CT   surgery.    Diagnosis: CHF (congestive heart failure)  Assessment and Plan of Treatment: Weigh yourself daily.  If you gain 3lbs in 3 days, or 5lbs in a week call your Health Care Provider.  Do not eat or drink foods containing more than 2000mg of salt (sodium) in your diet every day.  Call your Health Care Provider if you have any swelling or increased swelling in your feet, ankles, and/or stomach.  Take all of your medication as directed.  If you become dizzy call your Health Care Provider.      Diagnosis: Afib  Assessment and Plan of Treatment: Atrial fibrillation is the most common heart rhythm problem & has the risk of stroke & heart attack  It helps if you control your blood pressure, not drink more than 1-2 alcohol drinks per day, cut down on caffeine, getting treatment for over active thyroid gland, & getting exercise  Call your doctor if you feel your heart racing or beating unusually, chest tightness or pain, lightheaded, faint, shortness of breath especially with exercise  It is important to take your heart medication as prescribed  You may be on anticoagulation which is very important to take as directed - you may need blood work to monitor drug levels      Diagnosis: Diabetes  Assessment and Plan of Treatment: HgA1C this admission.  Make sure you get your HgA1c checked every three months.  If you take oral diabetes medications, check your blood glucose two times a day.  If you take insulin, check your blood glucose before meals and at bedtime.  It's important not to skip any meals.  Keep a log of your blood glucose results and always take it with you to your doctor appointments.  Keep a list of your current medications including injectables and over the counter medications and bring this medication list with you to all your doctor appointments.  If you have not seen your opthalmologist this year call for appointment.  Check your feet daily for redness, sores, or openings. Do not self treat. If no improvement in two days call your primary care physician for an appointment.  Low blood sugar (hypoglycemia) is a blood sugar below 70mg/dl. Check your blood sugar if you feel signs/symptoms of hypoglycemia. If your blood sugar is below 70 take 15 grams of carbohydrates (ex 4 oz of apple juice, 3-4 glucosr tablets, or 4-6 oz of regular soda) wait 15 minutes and repeat blood sugar to make sure it comes up above 70.  If your blood sugar is above 70 and you are due for a meal, have a meal.  If you are not due for a meal have a snack.  This snack helps keeps your blood sugar at a safe range.

## 2020-01-28 NOTE — DISCHARGE NOTE PROVIDER - PROVIDER TOKENS
PROVIDER:[TOKEN:[[id number]],FOLLOWUP:[2 weeks]] PROVIDER:[TOKEN:[[id number]],FOLLOWUP:[2 weeks]],PROVIDER:[TOKEN:[[id number]]]

## 2020-01-28 NOTE — PROGRESS NOTE ADULT - PROBLEM SELECTOR PLAN 8
- Currently remain in Afib, rate controlled   - C/w metoprolol and amiodarone   - resume coumadin at normal dosing

## 2020-01-28 NOTE — PROGRESS NOTE ADULT - ATTENDING COMMENTS
dc planning home today with resumption of HD services. pt is hemodynamically stable for dc. dc planning time 55 mins. discussed with case management and medicine NP.

## 2020-01-28 NOTE — PROGRESS NOTE ADULT - PROBLEM SELECTOR PLAN 5
- Pt with cough and worsening SOB, +sick contacts, found with paraflu+  - O2 as needed   - Tylenol prn   - Pt states he feels better with nebulizers, c/w q6h standing   - Guaifenesin prn cough
pt with cough and worsening SOB, +sick contacts, found with paraflu+  O2 as needed   tylenol prn   pt states he feels better with nebulizers, c/w q6h standing   Guaifenesin prn cough

## 2020-01-28 NOTE — DISCHARGE NOTE PROVIDER - NSDCMRMEDTOKEN_GEN_ALL_CORE_FT
amiodarone 200 mg oral tablet: 1 tab(s) orally once a day   aspirin 81 mg oral delayed release tablet: 1 tab(s) orally once a day  azelastine 0.05% ophthalmic solution: 1 drop(s) to each affected eye 2 times a day  Bevespi Aerosphere 9 mcg-4.8 mcg/inh inhalation aerosol: 2 puff(s) inhaled 2 times a day  calcium acetate 667 mg oral tablet: 1 tab(s) orally 3 times a day  Coumadin 3 mg oral tablet: 1 tab(s) orally once a day   docusate sodium 100 mg oral capsule: 1 cap(s) orally 3 times a day  HumaLOG KwikPen 100 units/mL injectable solution: 5 unit(s) injectable 3 times a day (with meals)   Lantus 100 units/mL subcutaneous solution: 14 unit(s) subcutaneous once a day (at bedtime)   methIMAzole 5 mg oral tablet: 1 tab(s) orally once a day  metoprolol succinate 100 mg oral tablet, extended release: 1 tab(s) orally every 12 hours  omeprazole 40 mg oral delayed release capsule: 1 cap(s) orally once a day  ProAir RespiClick 90 mcg/inh inhalation powder: 2 puff(s) inhaled every 4 hours, As Needed  simvastatin 40 mg oral tablet: 1 tab(s) orally once a day (at bedtime)  Spiriva Respimat: 2.5 microgram(s) inhaled 2 times a day amiodarone 200 mg oral tablet: 1 tab(s) orally once a day   aspirin 81 mg oral delayed release tablet: 1 tab(s) orally once a day  azelastine 0.05% ophthalmic solution: 1 drop(s) to each affected eye 2 times a day  Bevespi Aerosphere 9 mcg-4.8 mcg/inh inhalation aerosol: 2 puff(s) inhaled 2 times a day  calcium acetate 667 mg oral tablet: 1 tab(s) orally 3 times a day  Coumadin 3 mg oral tablet: 1 tab(s) orally once a day   HumaLOG KwikPen 100 units/mL injectable solution: 5 unit(s) injectable 3 times a day (with meals)   Lantus 100 units/mL subcutaneous solution: 14 unit(s) subcutaneous once a day (at bedtime)   methIMAzole 5 mg oral tablet: 1 tab(s) orally once a day  metoprolol succinate 100 mg oral tablet, extended release: 1 tab(s) orally every 12 hours  omeprazole 40 mg oral delayed release capsule: 1 cap(s) orally once a day  ProAir RespiClick 90 mcg/inh inhalation powder: 2 puff(s) inhaled every 4 hours, As Needed  simvastatin 40 mg oral tablet: 1 tab(s) orally once a day (at bedtime)  Spiriva Respimat: 2.5 microgram(s) inhaled 2 times a day

## 2020-01-28 NOTE — DISCHARGE NOTE NURSING/CASE MANAGEMENT/SOCIAL WORK - PATIENT PORTAL LINK FT
You can access the FollowMyHealth Patient Portal offered by [facility] by registering at the following website: [email or web address]. By joining [facility]’s FollowMyHealth portal, you will also be able to view your health information using other applications (apps) compatible with our system.

## 2020-01-28 NOTE — PROGRESS NOTE ADULT - SUBJECTIVE AND OBJECTIVE BOX
S: Seen at . Denies SOB on room air. Denies chest pain. Review of systems otherwise (-)      MEDICATIONS  (STANDING):  aMIOdarone    Tablet 200 milliGRAM(s) Oral daily  aspirin enteric coated 81 milliGRAM(s) Oral daily  calcium acetate 667 milliGRAM(s) Oral three times a day with meals  dextrose 5%. 1000 milliLiter(s) (50 mL/Hr) IV Continuous <Continuous>  dextrose 50% Injectable 12.5 Gram(s) IV Push once  dextrose 50% Injectable 25 Gram(s) IV Push once  dextrose 50% Injectable 25 Gram(s) IV Push once  fluticasone propionate 50 MICROgram(s)/spray Nasal Spray 1 Spray(s) Both Nostrils two times a day  insulin glargine Injectable (LANTUS) 8 Unit(s) SubCutaneous at bedtime  insulin lispro (HumaLOG) corrective regimen sliding scale   SubCutaneous three times a day before meals  insulin lispro (HumaLOG) corrective regimen sliding scale   SubCutaneous at bedtime  insulin lispro Injectable (HumaLOG) 5 Unit(s) SubCutaneous three times a day before meals  ketotifen 0.025% Ophthalmic Solution 1 Drop(s) Both EYES daily  methimazole 5 milliGRAM(s) Oral daily  metoprolol succinate  milliGRAM(s) Oral daily  pantoprazole    Tablet 40 milliGRAM(s) Oral before breakfast  simvastatin 40 milliGRAM(s) Oral at bedtime  sodium chloride 0.65% Nasal 1 Spray(s) Both Nostrils two times a day  tiotropium 18 MICROgram(s) Capsule 1 Capsule(s) Inhalation daily    MEDICATIONS  (PRN):  acetaminophen   Tablet .. 650 milliGRAM(s) Oral every 6 hours PRN Temp greater or equal to 38C (100.4F), Mild Pain (1 - 3)  albuterol/ipratropium for Nebulization. 3 milliLiter(s) Nebulizer every 6 hours PRN Shortness of Breath and/or Wheezing  dextrose 40% Gel 15 Gram(s) Oral once PRN Blood Glucose LESS THAN 70 milliGRAM(s)/deciliter  glucagon  Injectable 1 milliGRAM(s) IntraMuscular once PRN Glucose LESS THAN 70 milligrams/deciliter  guaiFENesin   Syrup  (Sugar-Free) 100 milliGRAM(s) Oral every 6 hours PRN Cough      LABS:                            13.3   7.95  )-----------( 154      ( 28 Jan 2020 06:09 )             43.7     Hemoglobin: 13.3 g/dL (01-28 @ 06:09)  Hemoglobin: 13.3 g/dL (01-27 @ 05:51)  Hemoglobin: 13.0 g/dL (01-26 @ 06:46)  Hemoglobin: 13.0 g/dL (01-25 @ 06:55)  Hemoglobin: 13.1 g/dL (01-24 @ 08:08)    01-28    136  |  93<L>  |  89<H>  ----------------------------<  94  5.4<H>   |  20<L>  |  9.67<H>    Ca    9.1      28 Jan 2020 06:08      Creatinine Trend: 9.67<--, 5.47<--, 7.12<--, 4.92<--, 5.66<--, 6.74<--   PT/INR - ( 28 Jan 2020 06:09 )   PT: 18.6 sec;   INR: 1.61 ratio           01-27-20 @ 07:01  -  01-28-20 @ 07:00  --------------------------------------------------------  IN: 480 mL / OUT: 0 mL / NET: 480 mL        PHYSICAL EXAM  Vital Signs Last 24 Hrs  T(C): 36.7 (28 Jan 2020 05:10), Max: 36.8 (27 Jan 2020 13:52)  T(F): 98.1 (28 Jan 2020 05:10), Max: 98.3 (27 Jan 2020 13:52)  HR: 76 (28 Jan 2020 05:10) (69 - 87)  BP: 142/73 (28 Jan 2020 05:10) (115/65 - 142/73)  BP(mean): --  RR: 16 (28 Jan 2020 06:03) (16 - 18)  SpO2: 95% (28 Jan 2020 06:03) (92% - 99%)      Gen: Appears well in NAD  HEENT:  (-)icterus (-)pallor  CV: N S1 S2 1/6 DEJAH (+)2 Pulses B/l  Resp: CTA B/L, normal effort  GI: (+) BS Soft, NT, ND  Lymph:  (+) Trace LE Edema, (-)obvious lymphadenopathy  Skin: Warm to touch, Normal turgor  Psych: Appropriate mood and affect    TELE: JAVI 70-90    ASSESSMENT/PLAN: 68 y/o male well known to our office (Cardiologist - Dr. Méndez) with CAD s/p multiple stents and recent CABG in 9/2019 with course c/b Afib on coumadin, HTN, HLD, DM2, and ESRD on HD presents with lethargy, cough/SOB, LE edema and reported syncopal episode 1 week ago found to be volume overloaded with +parainfluenza and episode of syncope one week ago.     - Continue fluid removal with HD per renal  - TTE with normal LV function but with RVE with decreased RV function, Reviewed the echo, RV size is borderline enlarged but likely to secondary to volume status and loading conditions.  Doubt he truly has stage III diastolic dysfunction as his LA size is normal.  - Admission CT negative for PE - Pulm Eval noted  - Recommend lifelong ac with coumadin if no contraindications.  His INR is subtherapeutic but he is not being bridged with heparin gtt due to epistaxis  - No plans for RHC, Plan to repeat echo in office once euvolemic  - No further inpatient cardiac w/u  - Patient to f/u with Dr. Méndez on Tuesday 2/11 at 10:30 AM    Wilfred Robin PA-C  [facility]  Pager: [phone]

## 2020-01-28 NOTE — PROGRESS NOTE ADULT - PROBLEM SELECTOR PROBLEM 7
CAD (Coronary Artery Disease)

## 2020-01-28 NOTE — PROGRESS NOTE ADULT - PROBLEM SELECTOR PLAN 6
- Hypoxia and volume overload, possibly 2/2 ESRD   - No electrolyte abnormalities  - HD on 1/21, 1/22, 1/23, 1/25   -Resuming HD now on normal schedule

## 2020-02-10 ENCOUNTER — APPOINTMENT (OUTPATIENT)
Dept: VASCULAR SURGERY | Facility: CLINIC | Age: 70
End: 2020-02-10

## 2020-03-23 ENCOUNTER — APPOINTMENT (OUTPATIENT)
Dept: PULMONOLOGY | Facility: CLINIC | Age: 70
End: 2020-03-23

## 2020-06-18 ENCOUNTER — INPATIENT (INPATIENT)
Facility: HOSPITAL | Age: 70
LOS: 26 days | Discharge: HOME | DRG: 602 | End: 2020-07-15
Attending: INTERNAL MEDICINE | Admitting: INTERNAL MEDICINE
Payer: MEDICARE

## 2020-06-18 VITALS — HEIGHT: 66 IN | WEIGHT: 179.02 LBS

## 2020-06-18 DIAGNOSIS — Z98.49 CATARACT EXTRACTION STATUS, UNSPECIFIED EYE: Chronic | ICD-10-CM

## 2020-06-18 DIAGNOSIS — E11.9 TYPE 2 DIABETES MELLITUS WITHOUT COMPLICATIONS: ICD-10-CM

## 2020-06-18 DIAGNOSIS — E05.90 THYROTOXICOSIS, UNSPECIFIED WITHOUT THYROTOXIC CRISIS OR STORM: ICD-10-CM

## 2020-06-18 DIAGNOSIS — I48.91 UNSPECIFIED ATRIAL FIBRILLATION: ICD-10-CM

## 2020-06-18 DIAGNOSIS — L03.90 CELLULITIS, UNSPECIFIED: ICD-10-CM

## 2020-06-18 DIAGNOSIS — N18.6 END STAGE RENAL DISEASE: ICD-10-CM

## 2020-06-18 DIAGNOSIS — Z95.1 PRESENCE OF AORTOCORONARY BYPASS GRAFT: Chronic | ICD-10-CM

## 2020-06-18 DIAGNOSIS — I25.10 ATHEROSCLEROTIC HEART DISEASE OF NATIVE CORONARY ARTERY WITHOUT ANGINA PECTORIS: ICD-10-CM

## 2020-06-18 DIAGNOSIS — L03.116 CELLULITIS OF LEFT LOWER LIMB: ICD-10-CM

## 2020-06-18 DIAGNOSIS — I50.9 HEART FAILURE, UNSPECIFIED: ICD-10-CM

## 2020-06-18 DIAGNOSIS — E78.00 PURE HYPERCHOLESTEROLEMIA, UNSPECIFIED: ICD-10-CM

## 2020-06-18 DIAGNOSIS — Z99.2 DEPENDENCE ON RENAL DIALYSIS: Chronic | ICD-10-CM

## 2020-06-18 DIAGNOSIS — Z29.9 ENCOUNTER FOR PROPHYLACTIC MEASURES, UNSPECIFIED: ICD-10-CM

## 2020-06-18 LAB
ACETONE SERPL-MCNC: NEGATIVE — SIGNIFICANT CHANGE UP
ALBUMIN SERPL ELPH-MCNC: 3.5 G/DL — SIGNIFICANT CHANGE UP (ref 3.5–5)
ALP SERPL-CCNC: 71 U/L — SIGNIFICANT CHANGE UP (ref 40–120)
ALT FLD-CCNC: 19 U/L DA — SIGNIFICANT CHANGE UP (ref 10–60)
ANION GAP SERPL CALC-SCNC: 8 MMOL/L — SIGNIFICANT CHANGE UP (ref 5–17)
APTT BLD: 31.8 SEC — SIGNIFICANT CHANGE UP (ref 27.5–36.3)
AST SERPL-CCNC: 12 U/L — SIGNIFICANT CHANGE UP (ref 10–40)
BASOPHILS # BLD AUTO: 0.05 K/UL — SIGNIFICANT CHANGE UP (ref 0–0.2)
BASOPHILS NFR BLD AUTO: 0.8 % — SIGNIFICANT CHANGE UP (ref 0–2)
BILIRUB SERPL-MCNC: 0.8 MG/DL — SIGNIFICANT CHANGE UP (ref 0.2–1.2)
BUN SERPL-MCNC: 26 MG/DL — HIGH (ref 7–18)
CALCIUM SERPL-MCNC: 9.5 MG/DL — SIGNIFICANT CHANGE UP (ref 8.4–10.5)
CHLORIDE SERPL-SCNC: 94 MMOL/L — LOW (ref 96–108)
CO2 SERPL-SCNC: 33 MMOL/L — HIGH (ref 22–31)
CREAT SERPL-MCNC: 4.94 MG/DL — HIGH (ref 0.5–1.3)
EOSINOPHIL # BLD AUTO: 0.09 K/UL — SIGNIFICANT CHANGE UP (ref 0–0.5)
EOSINOPHIL NFR BLD AUTO: 1.4 % — SIGNIFICANT CHANGE UP (ref 0–6)
GLUCOSE BLDC GLUCOMTR-MCNC: 183 MG/DL — HIGH (ref 70–99)
GLUCOSE SERPL-MCNC: 158 MG/DL — HIGH (ref 70–99)
HCT VFR BLD CALC: 42.1 % — SIGNIFICANT CHANGE UP (ref 39–50)
HGB BLD-MCNC: 12.9 G/DL — LOW (ref 13–17)
IMM GRANULOCYTES NFR BLD AUTO: 0.8 % — SIGNIFICANT CHANGE UP (ref 0–1.5)
INR BLD: 1.58 RATIO — HIGH (ref 0.88–1.16)
LACTATE SERPL-SCNC: 0.7 MMOL/L — SIGNIFICANT CHANGE UP (ref 0.7–2)
LYMPHOCYTES # BLD AUTO: 0.74 K/UL — LOW (ref 1–3.3)
LYMPHOCYTES # BLD AUTO: 11.4 % — LOW (ref 13–44)
MAGNESIUM SERPL-MCNC: 2.1 MG/DL — SIGNIFICANT CHANGE UP (ref 1.6–2.6)
MCHC RBC-ENTMCNC: 29 PG — SIGNIFICANT CHANGE UP (ref 27–34)
MCHC RBC-ENTMCNC: 30.6 GM/DL — LOW (ref 32–36)
MCV RBC AUTO: 94.6 FL — SIGNIFICANT CHANGE UP (ref 80–100)
MONOCYTES # BLD AUTO: 0.95 K/UL — HIGH (ref 0–0.9)
MONOCYTES NFR BLD AUTO: 14.7 % — HIGH (ref 2–14)
NEUTROPHILS # BLD AUTO: 4.59 K/UL — SIGNIFICANT CHANGE UP (ref 1.8–7.4)
NEUTROPHILS NFR BLD AUTO: 70.9 % — SIGNIFICANT CHANGE UP (ref 43–77)
NRBC # BLD: 0 /100 WBCS — SIGNIFICANT CHANGE UP (ref 0–0)
NT-PROBNP SERPL-SCNC: HIGH PG/ML (ref 0–125)
PLATELET # BLD AUTO: 139 K/UL — LOW (ref 150–400)
POTASSIUM SERPL-MCNC: 4.3 MMOL/L — SIGNIFICANT CHANGE UP (ref 3.5–5.3)
POTASSIUM SERPL-SCNC: 4.3 MMOL/L — SIGNIFICANT CHANGE UP (ref 3.5–5.3)
PROT SERPL-MCNC: 8.3 G/DL — SIGNIFICANT CHANGE UP (ref 6–8.3)
PROTHROM AB SERPL-ACNC: 18.1 SEC — HIGH (ref 10–12.9)
RBC # BLD: 4.45 M/UL — SIGNIFICANT CHANGE UP (ref 4.2–5.8)
RBC # FLD: 14.4 % — SIGNIFICANT CHANGE UP (ref 10.3–14.5)
SODIUM SERPL-SCNC: 135 MMOL/L — SIGNIFICANT CHANGE UP (ref 135–145)
WBC # BLD: 6.47 K/UL — SIGNIFICANT CHANGE UP (ref 3.8–10.5)
WBC # FLD AUTO: 6.47 K/UL — SIGNIFICANT CHANGE UP (ref 3.8–10.5)

## 2020-06-18 PROCEDURE — 99285 EMERGENCY DEPT VISIT HI MDM: CPT

## 2020-06-18 PROCEDURE — 71045 X-RAY EXAM CHEST 1 VIEW: CPT | Mod: 26

## 2020-06-18 RX ORDER — METOPROLOL TARTRATE 50 MG
100 TABLET ORAL
Refills: 0 | Status: DISCONTINUED | OUTPATIENT
Start: 2020-06-18 | End: 2020-07-15

## 2020-06-18 RX ORDER — GLYCOPYRROLATE AND FORMOTEROL FUMARATE 9; 4.8 UG/1; UG/1
2 AEROSOL, METERED RESPIRATORY (INHALATION)
Refills: 0 | Status: DISCONTINUED | OUTPATIENT
Start: 2020-06-18 | End: 2020-07-05

## 2020-06-18 RX ORDER — ALBUTEROL 90 UG/1
2 AEROSOL, METERED ORAL EVERY 6 HOURS
Refills: 0 | Status: DISCONTINUED | OUTPATIENT
Start: 2020-06-18 | End: 2020-07-05

## 2020-06-18 RX ORDER — INSULIN LISPRO 100/ML
3 VIAL (ML) SUBCUTANEOUS
Refills: 0 | Status: DISCONTINUED | OUTPATIENT
Start: 2020-06-18 | End: 2020-06-26

## 2020-06-18 RX ORDER — CALCIUM ACETATE 667 MG
667 TABLET ORAL
Refills: 0 | Status: DISCONTINUED | OUTPATIENT
Start: 2020-06-18 | End: 2020-07-15

## 2020-06-18 RX ORDER — INSULIN LISPRO 100/ML
VIAL (ML) SUBCUTANEOUS
Refills: 0 | Status: DISCONTINUED | OUTPATIENT
Start: 2020-06-18 | End: 2020-07-15

## 2020-06-18 RX ORDER — PIPERACILLIN AND TAZOBACTAM 4; .5 G/20ML; G/20ML
3.38 INJECTION, POWDER, LYOPHILIZED, FOR SOLUTION INTRAVENOUS ONCE
Refills: 0 | Status: COMPLETED | OUTPATIENT
Start: 2020-06-18 | End: 2020-06-18

## 2020-06-18 RX ORDER — ACETAMINOPHEN 500 MG
650 TABLET ORAL EVERY 6 HOURS
Refills: 0 | Status: DISCONTINUED | OUTPATIENT
Start: 2020-06-18 | End: 2020-06-24

## 2020-06-18 RX ORDER — AMPICILLIN SODIUM AND SULBACTAM SODIUM 250; 125 MG/ML; MG/ML
1.5 INJECTION, POWDER, FOR SUSPENSION INTRAMUSCULAR; INTRAVENOUS EVERY 12 HOURS
Refills: 0 | Status: DISCONTINUED | OUTPATIENT
Start: 2020-06-18 | End: 2020-06-23

## 2020-06-18 RX ORDER — SIMVASTATIN 20 MG/1
40 TABLET, FILM COATED ORAL AT BEDTIME
Refills: 0 | Status: DISCONTINUED | OUTPATIENT
Start: 2020-06-18 | End: 2020-07-15

## 2020-06-18 RX ORDER — AMIODARONE HYDROCHLORIDE 400 MG/1
200 TABLET ORAL DAILY
Refills: 0 | Status: DISCONTINUED | OUTPATIENT
Start: 2020-06-18 | End: 2020-07-15

## 2020-06-18 RX ORDER — ONDANSETRON 8 MG/1
4 TABLET, FILM COATED ORAL ONCE
Refills: 0 | Status: COMPLETED | OUTPATIENT
Start: 2020-06-18 | End: 2020-06-18

## 2020-06-18 RX ORDER — KETOTIFEN FUMARATE 0.34 MG/ML
1 SOLUTION OPHTHALMIC
Refills: 0 | Status: DISCONTINUED | OUTPATIENT
Start: 2020-06-18 | End: 2020-07-15

## 2020-06-18 RX ORDER — WARFARIN SODIUM 2.5 MG/1
4 TABLET ORAL ONCE
Refills: 0 | Status: COMPLETED | OUTPATIENT
Start: 2020-06-18 | End: 2020-06-18

## 2020-06-18 RX ORDER — TIOTROPIUM BROMIDE 18 UG/1
1 CAPSULE ORAL; RESPIRATORY (INHALATION) DAILY
Refills: 0 | Status: DISCONTINUED | OUTPATIENT
Start: 2020-06-18 | End: 2020-07-05

## 2020-06-18 RX ORDER — HYDROMORPHONE HYDROCHLORIDE 2 MG/ML
0.5 INJECTION INTRAMUSCULAR; INTRAVENOUS; SUBCUTANEOUS ONCE
Refills: 0 | Status: DISCONTINUED | OUTPATIENT
Start: 2020-06-18 | End: 2020-06-18

## 2020-06-18 RX ORDER — INSULIN GLARGINE 100 [IU]/ML
10 INJECTION, SOLUTION SUBCUTANEOUS AT BEDTIME
Refills: 0 | Status: DISCONTINUED | OUTPATIENT
Start: 2020-06-18 | End: 2020-06-26

## 2020-06-18 RX ORDER — INSULIN LISPRO 100/ML
VIAL (ML) SUBCUTANEOUS AT BEDTIME
Refills: 0 | Status: DISCONTINUED | OUTPATIENT
Start: 2020-06-18 | End: 2020-07-15

## 2020-06-18 RX ORDER — VANCOMYCIN HCL 1 G
1000 VIAL (EA) INTRAVENOUS ONCE
Refills: 0 | Status: COMPLETED | OUTPATIENT
Start: 2020-06-18 | End: 2020-06-18

## 2020-06-18 RX ORDER — WARFARIN SODIUM 2.5 MG/1
3 TABLET ORAL ONCE
Refills: 0 | Status: DISCONTINUED | OUTPATIENT
Start: 2020-06-18 | End: 2020-06-18

## 2020-06-18 RX ORDER — SODIUM CHLORIDE 9 MG/ML
1000 INJECTION, SOLUTION INTRAVENOUS
Refills: 0 | Status: DISCONTINUED | OUTPATIENT
Start: 2020-06-18 | End: 2020-07-15

## 2020-06-18 RX ORDER — PANTOPRAZOLE SODIUM 20 MG/1
40 TABLET, DELAYED RELEASE ORAL
Refills: 0 | Status: DISCONTINUED | OUTPATIENT
Start: 2020-06-18 | End: 2020-07-15

## 2020-06-18 RX ORDER — ASPIRIN/CALCIUM CARB/MAGNESIUM 324 MG
81 TABLET ORAL DAILY
Refills: 0 | Status: DISCONTINUED | OUTPATIENT
Start: 2020-06-18 | End: 2020-07-15

## 2020-06-18 RX ORDER — TRAMADOL HYDROCHLORIDE 50 MG/1
25 TABLET ORAL EVERY 12 HOURS
Refills: 0 | Status: DISCONTINUED | OUTPATIENT
Start: 2020-06-18 | End: 2020-06-24

## 2020-06-18 RX ADMIN — Medication 667 MILLIGRAM(S): at 22:05

## 2020-06-18 RX ADMIN — SIMVASTATIN 40 MILLIGRAM(S): 20 TABLET, FILM COATED ORAL at 22:05

## 2020-06-18 RX ADMIN — Medication 250 MILLIGRAM(S): at 18:47

## 2020-06-18 RX ADMIN — PANTOPRAZOLE SODIUM 40 MILLIGRAM(S): 20 TABLET, DELAYED RELEASE ORAL at 22:04

## 2020-06-18 RX ADMIN — AMIODARONE HYDROCHLORIDE 200 MILLIGRAM(S): 400 TABLET ORAL at 22:04

## 2020-06-18 RX ADMIN — ONDANSETRON 4 MILLIGRAM(S): 8 TABLET, FILM COATED ORAL at 17:41

## 2020-06-18 RX ADMIN — PIPERACILLIN AND TAZOBACTAM 200 GRAM(S): 4; .5 INJECTION, POWDER, LYOPHILIZED, FOR SOLUTION INTRAVENOUS at 17:53

## 2020-06-18 RX ADMIN — WARFARIN SODIUM 4 MILLIGRAM(S): 2.5 TABLET ORAL at 22:04

## 2020-06-18 RX ADMIN — HYDROMORPHONE HYDROCHLORIDE 0.5 MILLIGRAM(S): 2 INJECTION INTRAMUSCULAR; INTRAVENOUS; SUBCUTANEOUS at 17:49

## 2020-06-18 RX ADMIN — GLYCOPYRROLATE AND FORMOTEROL FUMARATE 2 PUFF(S): 9; 4.8 AEROSOL, METERED RESPIRATORY (INHALATION) at 22:05

## 2020-06-18 RX ADMIN — INSULIN GLARGINE 10 UNIT(S): 100 INJECTION, SOLUTION SUBCUTANEOUS at 22:04

## 2020-06-18 NOTE — ED ADULT NURSE REASSESSMENT NOTE - NS ED NURSE REASSESS COMMENT FT1
received pt awake alert oriented x 3 not in acute distress,with saline lock intact,no redness,no swelling noted,waiting for bed.

## 2020-06-18 NOTE — H&P ADULT - HISTORY OF PRESENT ILLNESS
69M with CAD s/p multiple stents, s/p CABG (9/2019 course c/b Afib and LT pleural effusion), Diastolic CHF, HTN, HLD, DM2, Afib on coumadin and ESRD on HD (Tu, Thurs, Sat at [facility]), hyperthyroidism, presented to ER for pain and swelling of left leg since 3 days. Patient reports sustaining injury to posterior aspect of left leg 1 month ago when he accidentally hit his leg against his bed. He reports the wound has not healed since. Reports he developed pain and swelling of left leg since 3 days, 9/10 in severity, with difficulty ambulating. Reports he was advised to get doppler which he reviewed with [facility] cardiologist today, was told he has no clot and recommended to come to ER. Denies fever, chills, weakness, cough, dysuria, NVDC.

## 2020-06-18 NOTE — H&P ADULT - PROBLEM SELECTOR PLAN 1
-nonhealing wound on left leg with erythema and tenderness of surrounding skin, doppler negative for DVT outpt  -s/p vanc and azosyn in ER, will start unasyn  -f/u blood cultures,  -pain control  -may benefit from podiatry eval, will get xray to r/o osteo -nonhealing wound on left leg with erythema and tenderness of surrounding skin, doppler negative for DVT outpt  -s/p vanc and zosyn in ER, will start unasyn  -f/u blood cultures,  -pain control  -may benefit from podiatry eval, will get xray to r/o osteo

## 2020-06-18 NOTE — ED PROVIDER NOTE - MUSCULOSKELETAL, MLM
Spine appears normal, range of motion is not limited, LUE-AVG with thrills, RLE-min edema, scar medially, RLE-below knee-edematous, erythematous, tenderness/warm to palp., small ulcer noted, no drainage, no fluctuant

## 2020-06-18 NOTE — H&P ADULT - NSHPPHYSICALEXAM_GEN_ALL_CORE
Vital Signs Last 24 Hrs  T(C): 37.2 (18 Jun 2020 19:06), Max: 37.2 (18 Jun 2020 19:06)  T(F): 99 (18 Jun 2020 19:06), Max: 99 (18 Jun 2020 19:06)  HR: 81 (18 Jun 2020 19:06) (75 - 81)  BP: 118/71 (18 Jun 2020 19:06) (112/68 - 118/71)  BP(mean): --  RR: 18 (18 Jun 2020 19:06) (18 - 18)  SpO2: 96% (18 Jun 2020 19:06) (95% - 96%)    PHYSICAL EXAM:  GENERAL: NAD, well-developed  HEAD:  Atraumatic, Normocephalic  EYES: EOMI, PERRLA, conjunctiva and sclera clear  NECK: Supple, No JVD  CHEST/LUNG: Clear to auscultation bilaterally; No wheeze  HEART: Regular rate and rhythm; No murmurs, rubs, or gallops  ABDOMEN: Soft, Nontender, Nondistended; Bowel sounds present  EXTREMITIES:, No clubbing, cyanosis; 1+ pitting edema of LLE  PSYCH: AAOx3  NEUROLOGY: non-focal  SKIN: ulcer on posterior aspect of left leg, with erythema and tenderness of skin upto ankle

## 2020-06-18 NOTE — ED ADULT NURSE NOTE - OBJECTIVE STATEMENT
patient came to the ED with complaint with left lower leg and foot that is swollen and slightly red. Patient states that he is in some pain.

## 2020-06-18 NOTE — H&P ADULT - NSICDXPASTMEDICALHX_GEN_ALL_CORE_FT
PAST MEDICAL HISTORY:  CAD (Coronary Artery Disease)     Coronary Stent [id number] 10 stents,  to Ramus 1/2015, cath 01/2016 ( see results in HPI)    DM (Diabetes Mellitus) x 4 yrs without N/N/R    ESRD (end stage renal disease) on dialysis T-Th-Sat    H/O: CVA after cardiac stent placed 12/15/09 -no residual    Heart Attack 2/1/07 with subsequent stent    HTN (Hypertension)     Hypercholesterolemia     Hyperthyroidism     Stented coronary artery total 15 stents from [id number]
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
## 2020-06-18 NOTE — H&P ADULT - PROBLEM SELECTOR PLAN 2
pt on lantus 15u and humalog 5u premeal,   will start 10u lantus with 5u premeal of humalog, HSS  f/u a1c

## 2020-06-18 NOTE — H&P ADULT - ASSESSMENT
69M with CAD s/p multiple stents, s/p CABG (9/2019 course c/b Afib and LT pleural effusion), Diastolic CHF, HTN, HLD, DM2, Afib on coumadin and ESRD on HD (Tu, Thurs, Sat at [facility]), hyperthyroidism, presented to ER for pain and swelling of left leg, admitting to medicine for cellulitis.

## 2020-06-18 NOTE — H&P ADULT - PROBLEM SELECTOR PLAN 5
rate controlled  c/w amiodarone  on coumadin 3mg, monitor INR rate controlled  c/w amiodarone  on coumadin 3mg, will give 4mg tonight given INR 1.58 (goal 2-3)  monitor INR daily

## 2020-06-18 NOTE — ED PROVIDER NOTE - PMH
CAD (Coronary Artery Disease)    Coronary Stent  [id number] 10 stents,  to Ramus 1/2015, cath 01/2016 ( see results in HPI)  DM (Diabetes Mellitus)  x 4 yrs without N/N/R  ESRD (end stage renal disease) on dialysis  T-Th-Sat  H/O: CVA  after cardiac stent placed 12/15/09 -no residual  Heart Attack  2/1/07 with subsequent stent  HTN (Hypertension)    Hypercholesterolemia    Hyperthyroidism    Stented coronary artery  total 15 stents from [id number]

## 2020-06-18 NOTE — H&P ADULT - PROBLEM SELECTOR PLAN 4
TTS schedule with [facility],   last session of HD today, had 4 hours,   continue renal diet, phoslo,   monitor lytes, nephro consult Dr Calle

## 2020-06-18 NOTE — ED PROVIDER NOTE - OBJECTIVE STATEMENT
69 y.o. male with h/o IDDM, last dose last night, ESRD last HD today, c/o Lt leg swelling, pain for 3 days, pt is able to ambulate with some diff., no fever, chills, pt had doppler done-neg for DVT.  No pustule discharge.  Pt saw cardiologist & advised to go to ED.  Pt took Tylenol with relief

## 2020-06-18 NOTE — ED PROVIDER NOTE - PSH
Boil of Buttock  2006 and 2008  Hemodialysis access, AV graft  5/2015, L arm  S/P CABG x 4    S/P cataract extraction

## 2020-06-18 NOTE — H&P ADULT - PROBLEM SELECTOR PLAN 3
difficult iv by RNs. pending labs still. Called by CT tech for patient shaking after getting CT. Evaluated pt who is lucid, having pseudoseizure. c/w methimazole  f/u TSH

## 2020-06-18 NOTE — H&P ADULT - NSICDXPASTSURGICALHX_GEN_ALL_CORE_FT
PAST SURGICAL HISTORY:  Boil of Buttock 2006 and 2008    Hemodialysis access, AV graft 5/2015, L arm    S/P CABG x 4     S/P cataract extraction

## 2020-06-19 LAB
24R-OH-CALCIDIOL SERPL-MCNC: 33.8 NG/ML — SIGNIFICANT CHANGE UP (ref 30–80)
A1C WITH ESTIMATED AVERAGE GLUCOSE RESULT: 7.1 % — HIGH (ref 4–5.6)
ANION GAP SERPL CALC-SCNC: 9 MMOL/L — SIGNIFICANT CHANGE UP (ref 5–17)
APTT BLD: 29.3 SEC — SIGNIFICANT CHANGE UP (ref 27.5–36.3)
BUN SERPL-MCNC: 33 MG/DL — HIGH (ref 7–18)
CALCIUM SERPL-MCNC: 9.7 MG/DL — SIGNIFICANT CHANGE UP (ref 8.4–10.5)
CHLORIDE SERPL-SCNC: 92 MMOL/L — LOW (ref 96–108)
CHOLEST SERPL-MCNC: 94 MG/DL — SIGNIFICANT CHANGE UP (ref 10–199)
CO2 SERPL-SCNC: 33 MMOL/L — HIGH (ref 22–31)
CREAT SERPL-MCNC: 5.99 MG/DL — HIGH (ref 0.5–1.3)
ESTIMATED AVERAGE GLUCOSE: 157 MG/DL — HIGH (ref 68–114)
FOLATE SERPL-MCNC: 7.8 NG/ML — SIGNIFICANT CHANGE UP
GLUCOSE BLDC GLUCOMTR-MCNC: 121 MG/DL — HIGH (ref 70–99)
GLUCOSE BLDC GLUCOMTR-MCNC: 159 MG/DL — HIGH (ref 70–99)
GLUCOSE BLDC GLUCOMTR-MCNC: 225 MG/DL — HIGH (ref 70–99)
GLUCOSE BLDC GLUCOMTR-MCNC: 60 MG/DL — LOW (ref 70–99)
GLUCOSE BLDC GLUCOMTR-MCNC: 63 MG/DL — LOW (ref 70–99)
GLUCOSE BLDC GLUCOMTR-MCNC: 73 MG/DL — SIGNIFICANT CHANGE UP (ref 70–99)
GLUCOSE SERPL-MCNC: 130 MG/DL — HIGH (ref 70–99)
HCT VFR BLD CALC: 41 % — SIGNIFICANT CHANGE UP (ref 39–50)
HDLC SERPL-MCNC: 33 MG/DL — LOW
HGB BLD-MCNC: 12.3 G/DL — LOW (ref 13–17)
INR BLD: 1.46 RATIO — HIGH (ref 0.88–1.16)
LIPID PNL WITH DIRECT LDL SERPL: 39 MG/DL — SIGNIFICANT CHANGE UP
MCHC RBC-ENTMCNC: 28.9 PG — SIGNIFICANT CHANGE UP (ref 27–34)
MCHC RBC-ENTMCNC: 30 GM/DL — LOW (ref 32–36)
MCV RBC AUTO: 96.5 FL — SIGNIFICANT CHANGE UP (ref 80–100)
NRBC # BLD: 0 /100 WBCS — SIGNIFICANT CHANGE UP (ref 0–0)
PLATELET # BLD AUTO: 143 K/UL — LOW (ref 150–400)
POTASSIUM SERPL-MCNC: 5.2 MMOL/L — SIGNIFICANT CHANGE UP (ref 3.5–5.3)
POTASSIUM SERPL-SCNC: 5.2 MMOL/L — SIGNIFICANT CHANGE UP (ref 3.5–5.3)
PROTHROM AB SERPL-ACNC: 16.7 SEC — HIGH (ref 10–12.9)
RBC # BLD: 4.25 M/UL — SIGNIFICANT CHANGE UP (ref 4.2–5.8)
RBC # FLD: 14.6 % — HIGH (ref 10.3–14.5)
SARS-COV-2 RNA SPEC QL NAA+PROBE: SIGNIFICANT CHANGE UP
SODIUM SERPL-SCNC: 134 MMOL/L — LOW (ref 135–145)
TOTAL CHOLESTEROL/HDL RATIO MEASUREMENT: 2.8 RATIO — LOW (ref 3.4–9.6)
TRIGL SERPL-MCNC: 110 MG/DL — SIGNIFICANT CHANGE UP (ref 10–149)
TSH SERPL-MCNC: 0.49 UU/ML — SIGNIFICANT CHANGE UP (ref 0.34–4.82)
VIT B12 SERPL-MCNC: 828 PG/ML — SIGNIFICANT CHANGE UP (ref 232–1245)
WBC # BLD: 6.64 K/UL — SIGNIFICANT CHANGE UP (ref 3.8–10.5)
WBC # FLD AUTO: 6.64 K/UL — SIGNIFICANT CHANGE UP (ref 3.8–10.5)

## 2020-06-19 RX ORDER — WARFARIN SODIUM 2.5 MG/1
4 TABLET ORAL ONCE
Refills: 0 | Status: COMPLETED | OUTPATIENT
Start: 2020-06-19 | End: 2020-06-19

## 2020-06-19 RX ADMIN — GLYCOPYRROLATE AND FORMOTEROL FUMARATE 2 PUFF(S): 9; 4.8 AEROSOL, METERED RESPIRATORY (INHALATION) at 21:16

## 2020-06-19 RX ADMIN — Medication 3 UNIT(S): at 17:17

## 2020-06-19 RX ADMIN — Medication 1: at 17:18

## 2020-06-19 RX ADMIN — Medication 100 MILLIGRAM(S): at 17:17

## 2020-06-19 RX ADMIN — Medication 2: at 13:57

## 2020-06-19 RX ADMIN — PANTOPRAZOLE SODIUM 40 MILLIGRAM(S): 20 TABLET, DELAYED RELEASE ORAL at 05:33

## 2020-06-19 RX ADMIN — AMIODARONE HYDROCHLORIDE 200 MILLIGRAM(S): 400 TABLET ORAL at 05:33

## 2020-06-19 RX ADMIN — Medication 100 MILLIGRAM(S): at 05:33

## 2020-06-19 RX ADMIN — Medication 667 MILLIGRAM(S): at 14:01

## 2020-06-19 RX ADMIN — Medication 667 MILLIGRAM(S): at 08:31

## 2020-06-19 RX ADMIN — SIMVASTATIN 40 MILLIGRAM(S): 20 TABLET, FILM COATED ORAL at 21:16

## 2020-06-19 RX ADMIN — TIOTROPIUM BROMIDE 1 CAPSULE(S): 18 CAPSULE ORAL; RESPIRATORY (INHALATION) at 17:11

## 2020-06-19 RX ADMIN — Medication 3 UNIT(S): at 13:56

## 2020-06-19 RX ADMIN — AMPICILLIN SODIUM AND SULBACTAM SODIUM 100 GRAM(S): 250; 125 INJECTION, POWDER, FOR SUSPENSION INTRAMUSCULAR; INTRAVENOUS at 17:15

## 2020-06-19 RX ADMIN — KETOTIFEN FUMARATE 1 DROP(S): 0.34 SOLUTION OPHTHALMIC at 17:16

## 2020-06-19 RX ADMIN — KETOTIFEN FUMARATE 1 DROP(S): 0.34 SOLUTION OPHTHALMIC at 05:32

## 2020-06-19 RX ADMIN — WARFARIN SODIUM 4 MILLIGRAM(S): 2.5 TABLET ORAL at 21:16

## 2020-06-19 RX ADMIN — Medication 81 MILLIGRAM(S): at 14:01

## 2020-06-19 RX ADMIN — AMPICILLIN SODIUM AND SULBACTAM SODIUM 100 GRAM(S): 250; 125 INJECTION, POWDER, FOR SUSPENSION INTRAMUSCULAR; INTRAVENOUS at 05:32

## 2020-06-19 RX ADMIN — Medication 667 MILLIGRAM(S): at 17:14

## 2020-06-19 NOTE — CONSULT NOTE ADULT - SUBJECTIVE AND OBJECTIVE BOX
[facility] : Progress Note :: [phone], (office [phone]),   Dr Calle / Dr Esposito / Dr Macias / Dr Mendiola / Dr Narendra JULIEN / Dr Arroyo / Dr Knight / Dr Renan pickens  _____________________________________________________________________________________________  Patient is a 69y Male whom presented to the hospital with LT leg pain and swelling.  Was sent from cardiology clinic where he had gone for routine evaluation as was noted  to have RT leg swelling, to R/O DVT. ( doppler US negative for DVT).admitted for treatment of leg cellulitis   Renal consulted for ESRD    PAST MEDICAL & SURGICAL HISTORY:  ESRD (end stage renal disease) on dialysis: T-Th-Sat  Stented coronary artery: total 15 stents from 8960-0252  Hyperthyroidism  H/O: CVA: after cardiac stent placed 12/15/09 -no residual  Heart Attack: 2/1/07 with subsequent stent  Coronary Stent: 4239-8393 10 stents,  to Ramus 1/2015, cath 01/2016 ( see results in HPI)  Hypercholesterolemia  CAD (Coronary Artery Disease)  DM (Diabetes Mellitus): x 4 yrs without N/N/R  HTN (Hypertension)  S/P CABG x 4  Hemodialysis access, AV graft: 5/2015, L arm  S/P cataract extraction  Boil of Buttock: 2006 and 2008    lisinopril (Other)    Home Medications Reviewed  Hospital Medications:   MEDICATIONS  (STANDING):  aMIOdarone    Tablet 200 milliGRAM(s) Oral daily  ampicillin/sulbactam  IVPB 1.5 Gram(s) IV Intermittent every 12 hours  aspirin enteric coated 81 milliGRAM(s) Oral daily  calcium acetate 667 milliGRAM(s) Oral three times a day with meals  dextrose 5%. 1000 milliLiter(s) (50 mL/Hr) IV Continuous <Continuous>  glycopyrrolate 9 MICROgram(s)/formoterol 4.8 MICROgram(s) Inhaler 2 Puff(s) Inhalation two times a day  insulin glargine Injectable (LANTUS) 10 Unit(s) SubCutaneous at bedtime  insulin lispro (HumaLOG) corrective regimen sliding scale   SubCutaneous three times a day before meals  insulin lispro (HumaLOG) corrective regimen sliding scale   SubCutaneous at bedtime  insulin lispro Injectable (HumaLOG) 3 Unit(s) SubCutaneous three times a day before meals  ketotifen 0.025% Ophthalmic Solution 1 Drop(s) Both EYES two times a day  methimazole 5 milliGRAM(s) Oral daily  metoprolol tartrate 100 milliGRAM(s) Oral two times a day  pantoprazole    Tablet 40 milliGRAM(s) Oral before breakfast  simvastatin 40 milliGRAM(s) Oral at bedtime  tiotropium 18 MICROgram(s) Capsule 1 Capsule(s) Inhalation daily  warfarin 4 milliGRAM(s) Oral once    SOCIAL HISTORY:  Denies ETOh,Smoking,   FAMILY HISTORY:  Family history of coronary artery disease  Family history of diabetes mellitus (Sibling)        VITALS:  T(F): 97.8 (06-19-20 @ 16:14), Max: 99.4 (06-19-20 @ 05:20)  HR: 92 (06-19-20 @ 16:14)  BP: 101/58 (06-19-20 @ 16:14)  RR: 18 (06-19-20 @ 16:14)  SpO2: 100% (06-19-20 @ 16:14)  Wt(kg): --      PHYSICAL EXAM:  Constitutional: NAD  HEENT: anicteric sclera, oropharynx clear.  Neck: No JVD  Respiratory: CTAB, no wheezes, rales or rhonchi  Cardiovascular: S1, S2, RRR  Gastrointestinal: BS+, soft, NT/ND  Extremities:  No peripheral edema  Neurological: A/O x 3, no focal deficits  Vascular Access: RUEAVF with thrill and bruit    LABS:  06-19    134<L>  |  92<L>  |  33<H>  ----------------------------<  130<H>  5.2   |  33<H>  |  5.99<H>    Ca    9.7      19 Jun 2020 06:20  Mg     2.1     06-18    TPro  8.3  /  Alb  3.5  /  TBili  0.8  /  DBili      /  AST  12  /  ALT  19  /  AlkPhos  71  06-18    Creatinine Trend: 5.99 <--, 4.94 <--                        12.3   6.64  )-----------( 143      ( 19 Jun 2020 06:20 )             41.0     Urine Studies:      RADIOLOGY & ADDITIONAL STUDIES:

## 2020-06-19 NOTE — PROGRESS NOTE ADULT - PROBLEM SELECTOR PLAN 1
-nonhealing wound on left leg with erythema and tenderness of surrounding skin, doppler negative for DVT outpt  -s/p vanc and zosyn in ER  -cont Zosyn   -f/u blood cultures

## 2020-06-19 NOTE — PROGRESS NOTE ADULT - SUBJECTIVE AND OBJECTIVE BOX
69M with CAD s/p multiple stents, s/p CABG (9/2019 course c/b Afib and LT pleural effusion), Diastolic CHF, HTN, HLD, DM2, Afib on coumadin and ESRD on HD (Tu, Thurs, Sat at [facility]), hyperthyroidism, presented to ER for pain and swelling of left leg since 3 days. admitted for cellulitis, started on Unasyn, Nepho Dr. Calle consulted     OVERNIGHT EVENTS: no acute events overnight     REVIEW OF SYSTEMS:  NECK: No pain or stiffness  RESPIRATORY: No cough, SOB  CARDIOVASCULAR: No chest pain, palpitations  GASTROINTESTINAL: No abdominal pain. No nausea, vomiting, or diarrhea  GENITOURINARY: No dysuria  NEUROLOGICAL: No HA  MUSCULOSKELETAL: left leg tenderness     T(C): 36.6 (06-19-20 @ 09:43), Max: 37.4 (06-19-20 @ 05:20)  HR: 94 (06-19-20 @ 09:43) (63 - 94)  BP: 127/71 (06-19-20 @ 09:43) (84/46 - 127/71)  RR: 18 (06-19-20 @ 09:43) (17 - 18)  SpO2: 100% (06-19-20 @ 09:43) (85% - 100%)  Wt(kg): --Vital Signs Last 24 Hrs  T(C): 36.6 (19 Jun 2020 09:43), Max: 37.4 (19 Jun 2020 05:20)  T(F): 97.9 (19 Jun 2020 09:43), Max: 99.4 (19 Jun 2020 05:20)  HR: 94 (19 Jun 2020 09:43) (63 - 94)  BP: 127/71 (19 Jun 2020 09:43) (84/46 - 127/71)  BP(mean): --  RR: 18 (19 Jun 2020 09:43) (17 - 18)  SpO2: 100% (19 Jun 2020 09:43) (85% - 100%)    MEDICATIONS  (STANDING):  aMIOdarone    Tablet 200 milliGRAM(s) Oral daily  ampicillin/sulbactam  IVPB 1.5 Gram(s) IV Intermittent every 12 hours  aspirin enteric coated 81 milliGRAM(s) Oral daily  calcium acetate 667 milliGRAM(s) Oral three times a day with meals  dextrose 5%. 1000 milliLiter(s) (50 mL/Hr) IV Continuous <Continuous>  glycopyrrolate 9 MICROgram(s)/formoterol 4.8 MICROgram(s) Inhaler 2 Puff(s) Inhalation two times a day  insulin glargine Injectable (LANTUS) 10 Unit(s) SubCutaneous at bedtime  insulin lispro (HumaLOG) corrective regimen sliding scale   SubCutaneous three times a day before meals  insulin lispro (HumaLOG) corrective regimen sliding scale   SubCutaneous at bedtime  insulin lispro Injectable (HumaLOG) 3 Unit(s) SubCutaneous three times a day before meals  ketotifen 0.025% Ophthalmic Solution 1 Drop(s) Both EYES two times a day  methimazole 5 milliGRAM(s) Oral daily  metoprolol tartrate 100 milliGRAM(s) Oral two times a day  pantoprazole    Tablet 40 milliGRAM(s) Oral before breakfast  simvastatin 40 milliGRAM(s) Oral at bedtime  tiotropium 18 MICROgram(s) Capsule 1 Capsule(s) Inhalation daily    MEDICATIONS  (PRN):  acetaminophen   Tablet .. 650 milliGRAM(s) Oral every 6 hours PRN Mild Pain (1 - 3)  ALBUTerol    90 MICROgram(s) HFA Inhaler 2 Puff(s) Inhalation every 6 hours PRN Shortness of Breath and/or Wheezing  traMADol 25 milliGRAM(s) Oral every 12 hours PRN Moderate Pain (4 - 6)      PHYSICAL EXAM:  GENERAL: NAD  NECK: Supple, No JVD  CHEST/LUNG: Clear to auscultation bilaterally; No rales, rhonchi, wheezing, or rubs  HEART: S1, S2, Regular rate and rhythm  ABDOMEN: Soft, Nontender, Nondistended; Bowel sounds present  NEURO: Alert & Oriented X3  EXTREMITIES: left LLE redness, +ve warmth     Consultant(s) Notes Reviewed:  [x ] YES  [ ] NO  Care Discussed with Consultants/Other Providers [ x] YES  [ ] NO    LABS:                        12.3   6.64  )-----------( 143      ( 19 Jun 2020 06:20 )             41.0     06-19    134<L>  |  92<L>  |  33<H>  ----------------------------<  130<H>  5.2   |  33<H>  |  5.99<H>    Ca    9.7      19 Jun 2020 06:20  Mg     2.1     06-18    TPro  8.3  /  Alb  3.5  /  TBili  0.8  /  DBili  x   /  AST  12  /  ALT  19  /  AlkPhos  71  06-18    PT/INR - ( 19 Jun 2020 06:20 )   PT: 16.7 sec;   INR: 1.46 ratio         PTT - ( 19 Jun 2020 06:20 )  PTT:29.3 sec    CAPILLARY BLOOD GLUCOSE      POCT Blood Glucose.: 225 mg/dL (19 Jun 2020 13:27)  POCT Blood Glucose.: 121 mg/dL (19 Jun 2020 08:29)  POCT Blood Glucose.: 183 mg/dL (18 Jun 2020 22:01)    RADIOLOGY & ADDITIONAL TESTS:    < from: Xray Chest 1 View-PORTABLE IMMEDIATE (06.18.20 @ 17:48) >    EXAM:  XR CHEST PORTABLE IMMED 1V                            PROCEDURE DATE:  06/18/2020          INTERPRETATION:  AP semierect chest on June 18, 2020 at 5:52 PM. Patient has leg cellulitis.    Gross heart enlargement and mild to moderate left basepleural pulmonary reaction again noted. Sternotomy again seen.    IMPRESSION: Unchanged chest as above.      < end of copied text >      Imaging Personally Reviewed:  [ ] YES  [ ] NO

## 2020-06-20 LAB
ANION GAP SERPL CALC-SCNC: 12 MMOL/L — SIGNIFICANT CHANGE UP (ref 5–17)
APTT BLD: 32.3 SEC — SIGNIFICANT CHANGE UP (ref 27.5–36.3)
BUN SERPL-MCNC: 62 MG/DL — HIGH (ref 7–18)
CALCIUM SERPL-MCNC: 9.4 MG/DL — SIGNIFICANT CHANGE UP (ref 8.4–10.5)
CHLORIDE SERPL-SCNC: 90 MMOL/L — LOW (ref 96–108)
CO2 SERPL-SCNC: 26 MMOL/L — SIGNIFICANT CHANGE UP (ref 22–31)
CREAT SERPL-MCNC: 8.05 MG/DL — HIGH (ref 0.5–1.3)
GLUCOSE BLDC GLUCOMTR-MCNC: 124 MG/DL — HIGH (ref 70–99)
GLUCOSE BLDC GLUCOMTR-MCNC: 143 MG/DL — HIGH (ref 70–99)
GLUCOSE BLDC GLUCOMTR-MCNC: 174 MG/DL — HIGH (ref 70–99)
GLUCOSE BLDC GLUCOMTR-MCNC: 174 MG/DL — HIGH (ref 70–99)
GLUCOSE BLDC GLUCOMTR-MCNC: 205 MG/DL — HIGH (ref 70–99)
GLUCOSE SERPL-MCNC: 205 MG/DL — HIGH (ref 70–99)
HBV SURFACE AB SER-ACNC: REACTIVE
HBV SURFACE AG SER-ACNC: SIGNIFICANT CHANGE UP
HCT VFR BLD CALC: 41.5 % — SIGNIFICANT CHANGE UP (ref 39–50)
HGB BLD-MCNC: 12.7 G/DL — LOW (ref 13–17)
INR BLD: 1.71 RATIO — HIGH (ref 0.88–1.16)
MCHC RBC-ENTMCNC: 28.5 PG — SIGNIFICANT CHANGE UP (ref 27–34)
MCHC RBC-ENTMCNC: 30.6 GM/DL — LOW (ref 32–36)
MCV RBC AUTO: 93 FL — SIGNIFICANT CHANGE UP (ref 80–100)
NRBC # BLD: 0 /100 WBCS — SIGNIFICANT CHANGE UP (ref 0–0)
PLATELET # BLD AUTO: 189 K/UL — SIGNIFICANT CHANGE UP (ref 150–400)
POTASSIUM SERPL-MCNC: 5.2 MMOL/L — SIGNIFICANT CHANGE UP (ref 3.5–5.3)
POTASSIUM SERPL-SCNC: 5.2 MMOL/L — SIGNIFICANT CHANGE UP (ref 3.5–5.3)
PROTHROM AB SERPL-ACNC: 19.7 SEC — HIGH (ref 10–12.9)
RBC # BLD: 4.46 M/UL — SIGNIFICANT CHANGE UP (ref 4.2–5.8)
RBC # FLD: 14.6 % — HIGH (ref 10.3–14.5)
SODIUM SERPL-SCNC: 128 MMOL/L — LOW (ref 135–145)
WBC # BLD: 8.26 K/UL — SIGNIFICANT CHANGE UP (ref 3.8–10.5)
WBC # FLD AUTO: 8.26 K/UL — SIGNIFICANT CHANGE UP (ref 3.8–10.5)

## 2020-06-20 PROCEDURE — 93970 EXTREMITY STUDY: CPT | Mod: 26

## 2020-06-20 RX ORDER — WARFARIN SODIUM 2.5 MG/1
5 TABLET ORAL AT BEDTIME
Refills: 0 | Status: COMPLETED | OUTPATIENT
Start: 2020-06-20 | End: 2020-06-20

## 2020-06-20 RX ADMIN — Medication 100 MILLIGRAM(S): at 06:09

## 2020-06-20 RX ADMIN — INSULIN GLARGINE 10 UNIT(S): 100 INJECTION, SOLUTION SUBCUTANEOUS at 21:39

## 2020-06-20 RX ADMIN — Medication 3 UNIT(S): at 08:54

## 2020-06-20 RX ADMIN — KETOTIFEN FUMARATE 1 DROP(S): 0.34 SOLUTION OPHTHALMIC at 06:10

## 2020-06-20 RX ADMIN — Medication 3 UNIT(S): at 17:22

## 2020-06-20 RX ADMIN — AMPICILLIN SODIUM AND SULBACTAM SODIUM 100 GRAM(S): 250; 125 INJECTION, POWDER, FOR SUSPENSION INTRAMUSCULAR; INTRAVENOUS at 18:52

## 2020-06-20 RX ADMIN — SIMVASTATIN 40 MILLIGRAM(S): 20 TABLET, FILM COATED ORAL at 21:39

## 2020-06-20 RX ADMIN — Medication 667 MILLIGRAM(S): at 17:23

## 2020-06-20 RX ADMIN — GLYCOPYRROLATE AND FORMOTEROL FUMARATE 2 PUFF(S): 9; 4.8 AEROSOL, METERED RESPIRATORY (INHALATION) at 17:25

## 2020-06-20 RX ADMIN — Medication 2: at 17:21

## 2020-06-20 RX ADMIN — WARFARIN SODIUM 5 MILLIGRAM(S): 2.5 TABLET ORAL at 21:39

## 2020-06-20 RX ADMIN — AMPICILLIN SODIUM AND SULBACTAM SODIUM 100 GRAM(S): 250; 125 INJECTION, POWDER, FOR SUSPENSION INTRAMUSCULAR; INTRAVENOUS at 06:11

## 2020-06-20 RX ADMIN — Medication 81 MILLIGRAM(S): at 17:23

## 2020-06-20 RX ADMIN — AMIODARONE HYDROCHLORIDE 200 MILLIGRAM(S): 400 TABLET ORAL at 06:09

## 2020-06-20 RX ADMIN — Medication 100 MILLIGRAM(S): at 17:54

## 2020-06-20 RX ADMIN — Medication 100 MILLIGRAM(S): at 17:23

## 2020-06-20 RX ADMIN — GLYCOPYRROLATE AND FORMOTEROL FUMARATE 2 PUFF(S): 9; 4.8 AEROSOL, METERED RESPIRATORY (INHALATION) at 21:38

## 2020-06-20 RX ADMIN — PANTOPRAZOLE SODIUM 40 MILLIGRAM(S): 20 TABLET, DELAYED RELEASE ORAL at 06:09

## 2020-06-20 RX ADMIN — KETOTIFEN FUMARATE 1 DROP(S): 0.34 SOLUTION OPHTHALMIC at 17:47

## 2020-06-20 RX ADMIN — Medication 667 MILLIGRAM(S): at 08:49

## 2020-06-20 RX ADMIN — TIOTROPIUM BROMIDE 1 CAPSULE(S): 18 CAPSULE ORAL; RESPIRATORY (INHALATION) at 17:47

## 2020-06-20 NOTE — PROGRESS NOTE ADULT - SUBJECTIVE AND OBJECTIVE BOX
Nephrology Followup Note - [phone] - Dr Mendiola / Dr Knight / Dr Macias / Dr Arroyo / Dr Mackey / Dr Bowen / Dr Esposito / Dr Calle  Pt seen and examined during HD   No acute events overnight. No complaints.     Allergies:  lisinopril (Other)    Hospital Medications:   MEDICATIONS  (STANDING):  aMIOdarone    Tablet 200 milliGRAM(s) Oral daily  ampicillin/sulbactam  IVPB 1.5 Gram(s) IV Intermittent every 12 hours  aspirin enteric coated 81 milliGRAM(s) Oral daily  calcium acetate 667 milliGRAM(s) Oral three times a day with meals  dextrose 5%. 1000 milliLiter(s) (50 mL/Hr) IV Continuous <Continuous>  doxycycline hyclate Capsule 100 milliGRAM(s) Oral every 12 hours  glycopyrrolate 9 MICROgram(s)/formoterol 4.8 MICROgram(s) Inhaler 2 Puff(s) Inhalation two times a day  insulin glargine Injectable (LANTUS) 10 Unit(s) SubCutaneous at bedtime  insulin lispro (HumaLOG) corrective regimen sliding scale   SubCutaneous three times a day before meals  insulin lispro (HumaLOG) corrective regimen sliding scale   SubCutaneous at bedtime  insulin lispro Injectable (HumaLOG) 3 Unit(s) SubCutaneous three times a day before meals  ketotifen 0.025% Ophthalmic Solution 1 Drop(s) Both EYES two times a day  methimazole 5 milliGRAM(s) Oral daily  metoprolol tartrate 100 milliGRAM(s) Oral two times a day  pantoprazole    Tablet 40 milliGRAM(s) Oral before breakfast  simvastatin 40 milliGRAM(s) Oral at bedtime  tiotropium 18 MICROgram(s) Capsule 1 Capsule(s) Inhalation daily      VITALS:  T(F): 97.1 (06-20-20 @ 11:32), Max: 98.4 (06-20-20 @ 08:35)  HR: 87 (06-20-20 @ 11:32)  BP: 103/67 (06-20-20 @ 11:32)  RR: 18 (06-20-20 @ 11:32)  SpO2: 86% (06-20-20 @ 11:32)  Wt(kg): --      PHYSICAL EXAM:  Constitutional: NAD  HEENT: anicteric sclera, oropharynx clear, MMM  Neck: No JVD  Respiratory: CTAB, no wheezes, rales or rhonchi  Cardiovascular: S1, S2, RRR  Gastrointestinal: BS+, soft, NT/ND  Extremities: No cyanosis or clubbing. +peripheral edema, L>R   Neurological: A/O x 3, no focal deficits  Psychiatric: Normal mood, normal affect  : No CVA tenderness. No wagner.   Skin: No rashes  Vascular Access: LUE AV access +thrill and bruit.     LABS:  06-20    128<L>  |  90<L>  |  62<H>  ----------------------------<  205<H>  5.2   |  26  |  8.05<H>    Ca    9.4      20 Jun 2020 11:56  Mg     2.1     06-18    TPro  8.3  /  Alb  3.5  /  TBili  0.8  /  DBili      /  AST  12  /  ALT  19  /  AlkPhos  71  06-18    Creatinine Trend: 8.05 <--, 5.99 <--, 4.94 <--                        12.7   8.26  )-----------( 189      ( 20 Jun 2020 11:56 )             41.5     Urine Studies:      RADIOLOGY & ADDITIONAL STUDIES:

## 2020-06-20 NOTE — PROGRESS NOTE ADULT - SUBJECTIVE AND OBJECTIVE BOX
Subjective: No chest pain or sob; still with LLE swelling; ROS otherwise negative.   	  MEDICATIONS:  MEDICATIONS  (STANDING):  aMIOdarone    Tablet 200 milliGRAM(s) Oral daily  ampicillin/sulbactam  IVPB 1.5 Gram(s) IV Intermittent every 12 hours  aspirin enteric coated 81 milliGRAM(s) Oral daily  calcium acetate 667 milliGRAM(s) Oral three times a day with meals  dextrose 5%. 1000 milliLiter(s) (50 mL/Hr) IV Continuous <Continuous>  doxycycline hyclate Capsule 100 milliGRAM(s) Oral every 12 hours  glycopyrrolate 9 MICROgram(s)/formoterol 4.8 MICROgram(s) Inhaler 2 Puff(s) Inhalation two times a day  insulin glargine Injectable (LANTUS) 10 Unit(s) SubCutaneous at bedtime  insulin lispro (HumaLOG) corrective regimen sliding scale   SubCutaneous three times a day before meals  insulin lispro (HumaLOG) corrective regimen sliding scale   SubCutaneous at bedtime  insulin lispro Injectable (HumaLOG) 3 Unit(s) SubCutaneous three times a day before meals  ketotifen 0.025% Ophthalmic Solution 1 Drop(s) Both EYES two times a day  methimazole 5 milliGRAM(s) Oral daily  metoprolol tartrate 100 milliGRAM(s) Oral two times a day  pantoprazole    Tablet 40 milliGRAM(s) Oral before breakfast  simvastatin 40 milliGRAM(s) Oral at bedtime  tiotropium 18 MICROgram(s) Capsule 1 Capsule(s) Inhalation daily      LABS:	 	    CARDIAC MARKERS:                                12.7   8.26  )-----------( 189      ( 20 Jun 2020 11:56 )             41.5     06-20    128<L>  |  90<L>  |  62<H>  ----------------------------<  205<H>  5.2   |  26  |  8.05<H>    Ca    9.4      20 Jun 2020 11:56  Mg     2.1     06-18    TPro  8.3  /  Alb  3.5  /  TBili  0.8  /  DBili  x   /  AST  12  /  ALT  19  /  AlkPhos  71  06-18    proBNP:   Lipid Profile:   HgA1c:   TSH:       PHYSICAL EXAM:  T(C): 36.2 (06-20-20 @ 11:32), Max: 36.9 (06-20-20 @ 08:35)  HR: 87 (06-20-20 @ 11:32) (71 - 92)  BP: 103/67 (06-20-20 @ 11:32) (101/58 - 120/66)  RR: 18 (06-20-20 @ 11:32) (18 - 18)  SpO2: 86% (06-20-20 @ 11:32) (86% - 100%)  Wt(kg): --  I&O's Summary        Appearance: Normal	  HEENT:   Normal oral mucosa, PERRL, EOMI	  Lymphatic: No lymphadenopathy , + edema in LLE   Cardiovascular: Normal S1 S2, No JVD, No murmurs , Peripheral pulses palpable 2+ bilaterally  Respiratory: Lungs clear to auscultation, normal effort 	  Gastrointestinal:  Soft, Non-tender, + BS	  Skin: No rashes, No ecchymoses, No cyanosis, warm to touch  Musculoskeletal: Normal range of motion, normal strength  Psychiatry:  Mood & affect appropriate    TELEMETRY: 	    ECG: Aflutter RBBB, LAFB 	  RADIOLOGY:   DIAGNOSTIC TESTING:  [ ] Echocardiogram:  [ ]  Catheterization:  [ ] Stress Test:    OTHER: 	      ASSESSMENT/PLAN: 69y Male  CAD s/p multiple stents, s/p CABG (9/2019 course c/b Afib and LT pleural effusion), Normal LV systolic function, severe Diastolic, RV dysfx CHF, HTN, HLD, DM2, Afib on coumadin and ESRD on HD (Tu, Thurs, Sat at [facility]), hyperthyroidism, presented to ER for pain and swelling of left leg since 3 days.    - LE dopplers negative for dVT   - Continue with coumadin for goal INR 2-3 - given subtherapeutic INR, would start hep gtt  - f/u lower extremity Xrays   - continue with fluid removal with HD  - check TTE  - further workup pending above    Kalie Lau MD

## 2020-06-20 NOTE — PROGRESS NOTE ADULT - ASSESSMENT
Patient is a 69y Male whom presented to the hospital with LT leg pain and swelling.  Was sent from cardiology clinic where he had gone for routine evaluation as was noted  to have RT leg swelling, to R/O DVT. ( doppler US negative for DVT).admitted for treatment of leg cellulitis   Renal consulted for ESRD    A/P  #ESRD . Tolerating HD today, UF goal 1.5-2 kg, as BP tolerates.   # Anemia of CKD. stable off ROSARIO  # lytes and acid base at goal  on mx for bilateral leg cellulitis, Neg DVT.

## 2020-06-20 NOTE — PROGRESS NOTE ADULT - SUBJECTIVE AND OBJECTIVE BOX
Patient is a 69y old  Male who presents with a chief complaint of leg pain (19 Jun 2020 17:58)    PATIENT IS SEEN AND EXAMINED IN MEDICAL FLOOR.    NGT [    ]    DOSS [   ]      GT [   ]    ALLERGIES:  lisinopril (Other)      Daily     Daily     VITALS:    Vital Signs Last 24 Hrs  T(C): 36.9 (20 Jun 2020 08:35), Max: 36.9 (20 Jun 2020 08:35)  T(F): 98.4 (20 Jun 2020 08:35), Max: 98.4 (20 Jun 2020 08:35)  HR: 74 (20 Jun 2020 08:35) (71 - 92)  BP: 105/59 (20 Jun 2020 08:35) (101/58 - 120/66)  BP(mean): --  RR: 18 (20 Jun 2020 08:35) (18 - 18)  SpO2: 88% (20 Jun 2020 08:35) (88% - 100%)    LABS:    CBC Full  -  ( 19 Jun 2020 06:20 )  WBC Count : 6.64 K/uL  RBC Count : 4.25 M/uL  Hemoglobin : 12.3 g/dL  Hematocrit : 41.0 %  Platelet Count - Automated : 143 K/uL  Mean Cell Volume : 96.5 fl  Mean Cell Hemoglobin : 28.9 pg  Mean Cell Hemoglobin Concentration : 30.0 gm/dL  Auto Neutrophil # : x  Auto Lymphocyte # : x  Auto Monocyte # : x  Auto Eosinophil # : x  Auto Basophil # : x  Auto Neutrophil % : x  Auto Lymphocyte % : x  Auto Monocyte % : x  Auto Eosinophil % : x  Auto Basophil % : x    PT/INR - ( 19 Jun 2020 06:20 )   PT: 16.7 sec;   INR: 1.46 ratio         PTT - ( 19 Jun 2020 06:20 )  PTT:29.3 sec  06-19    134<L>  |  92<L>  |  33<H>  ----------------------------<  130<H>  5.2   |  33<H>  |  5.99<H>    Ca    9.7      19 Jun 2020 06:20  Mg     2.1     06-18    TPro  8.3  /  Alb  3.5  /  TBili  0.8  /  DBili  x   /  AST  12  /  ALT  19  /  AlkPhos  71  06-18    CAPILLARY BLOOD GLUCOSE      POCT Blood Glucose.: 143 mg/dL (20 Jun 2020 07:37)  POCT Blood Glucose.: 174 mg/dL (20 Jun 2020 05:45)  POCT Blood Glucose.: 73 mg/dL (19 Jun 2020 22:48)  POCT Blood Glucose.: 63 mg/dL (19 Jun 2020 22:11)  POCT Blood Glucose.: 60 mg/dL (19 Jun 2020 21:07)  POCT Blood Glucose.: 159 mg/dL (19 Jun 2020 16:53)  POCT Blood Glucose.: 225 mg/dL (19 Jun 2020 13:27)        LIVER FUNCTIONS - ( 18 Jun 2020 16:49 )  Alb: 3.5 g/dL / Pro: 8.3 g/dL / ALK PHOS: 71 U/L / ALT: 19 U/L DA / AST: 12 U/L / GGT: x           Creatinine Trend: 5.99<--, 4.94<--  I&O's Summary          .Blood Blood  06-18 @ 22:15   No growth to date.  --  --          MEDICATIONS:    MEDICATIONS  (STANDING):  aMIOdarone    Tablet 200 milliGRAM(s) Oral daily  ampicillin/sulbactam  IVPB 1.5 Gram(s) IV Intermittent every 12 hours  aspirin enteric coated 81 milliGRAM(s) Oral daily  calcium acetate 667 milliGRAM(s) Oral three times a day with meals  dextrose 5%. 1000 milliLiter(s) (50 mL/Hr) IV Continuous <Continuous>  glycopyrrolate 9 MICROgram(s)/formoterol 4.8 MICROgram(s) Inhaler 2 Puff(s) Inhalation two times a day  insulin glargine Injectable (LANTUS) 10 Unit(s) SubCutaneous at bedtime  insulin lispro (HumaLOG) corrective regimen sliding scale   SubCutaneous three times a day before meals  insulin lispro (HumaLOG) corrective regimen sliding scale   SubCutaneous at bedtime  insulin lispro Injectable (HumaLOG) 3 Unit(s) SubCutaneous three times a day before meals  ketotifen 0.025% Ophthalmic Solution 1 Drop(s) Both EYES two times a day  methimazole 5 milliGRAM(s) Oral daily  metoprolol tartrate 100 milliGRAM(s) Oral two times a day  pantoprazole    Tablet 40 milliGRAM(s) Oral before breakfast  simvastatin 40 milliGRAM(s) Oral at bedtime  tiotropium 18 MICROgram(s) Capsule 1 Capsule(s) Inhalation daily      MEDICATIONS  (PRN):  acetaminophen   Tablet .. 650 milliGRAM(s) Oral every 6 hours PRN Mild Pain (1 - 3)  ALBUTerol    90 MICROgram(s) HFA Inhaler 2 Puff(s) Inhalation every 6 hours PRN Shortness of Breath and/or Wheezing  traMADol 25 milliGRAM(s) Oral every 12 hours PRN Moderate Pain (4 - 6)        REVIEW OF SYSTEMS:                           ALL ROS DONE [ X   ]      CONSTITUTIONAL:  LETHARGIC [   ], FEVER [   ], UNRESPONSIVE [   ]  CVS:  CP  [   ], SOB, [   ], PALPITATIONS [   ], DIZZYNESS [   ]  RS: COUGH [   ], SPUTUM [   ]  GI: ABDOMINAL PAIN [   ], NAUSEA [   ], VOMITINGS [   ], DIARRHEA [   ], CONSTIPATION [   ]  :  DYSURIA [   ], NOCTURIA [   ], INCREASED FREQUENCY [   ], DRIBLING [   ],  SKELETAL: PAINFUL JOINTS [   ], SWOLLEN JOINTS [   ], NECK ACHE [   ], LOW BACK ACHE [   ],  SKIN : ULCERS [   ], RASH [   ], ITCHING [   ]  CNS: HEAD ACHE [   ], DOUBLE VISION [   ], BLURRED VISION [   ], AMS / CONFUSION [   ], SEIZURES [   ], WEAKNESS [   ],TINGLING / NUMBNESS [   ]      PHYSICAL EXAMINATION:    GENERAL APPEARANCE: NO DISTRESS  HEENT:  NO PALLOR, NO  JVD,  NO   NODES, NECK SUPPLE  CVS: S1 +, S2 +,   RS: AEEB,  OCCASIONAL  RALES +,   NO RONCHI  ABD: SOFT, NT, NO, BS +  EXT: NO PE  SKIN: WARM,   SKELETAL:  ROM ACCEPTABLE  CNS:  AAO X    ,   DEFICITS        RADIOLOGY :          ASSESSMENT :     Cellulitis  ESRD (end stage renal disease) on dialysis  Stented coronary artery  Hyperthyroidism  CKD (chronic kidney disease)  H/O: CVA  Heart Attack  Coronary Stent  Hypercholesterolemia  CAD (Coronary Artery Disease)  DM (Diabetes Mellitus)  HTN (Hypertension)  S/P CABG x 4  Hemodialysis access, AV graft  S/P cataract extraction  Boil of Buttock      PLAN:  HPI:  69M with CAD s/p multiple stents, s/p CABG (9/2019 course c/b Afib and LT pleural effusion), Diastolic CHF, HTN, HLD, DM2, Afib on coumadin and ESRD on HD (Tu, Thurs, Sat at [facility]), hyperthyroidism, presented to ER for pain and swelling of left leg since 3 days. Patient reports sustaining injury to posterior aspect of left leg 1 month ago when he accidentally hit his leg against his bed. He reports the wound has not healed since. Reports he developed pain and swelling of left leg since 3 days, 9/10 in severity, with difficulty ambulating. Reports he was advised to get doppler which he reviewed with [facility] cardiologist today, was told he has no clot and recommended to come to ER. Denies fever, chills, weakness, cough, dysuria, NVDC. (18 Jun 2020 19:29)    - Patient is a 69y old  Male who presents with a chief complaint of leg pain (19 Jun 2020 17:58)    PATIENT IS SEEN AND EXAMINED IN MEDICAL FLOOR.      ALLERGIES:  lisinopril (Other)      VITALS:    Vital Signs Last 24 Hrs  T(C): 36.9 (20 Jun 2020 08:35), Max: 36.9 (20 Jun 2020 08:35)  T(F): 98.4 (20 Jun 2020 08:35), Max: 98.4 (20 Jun 2020 08:35)  HR: 74 (20 Jun 2020 08:35) (71 - 92)  BP: 105/59 (20 Jun 2020 08:35) (101/58 - 120/66)  BP(mean): --  RR: 18 (20 Jun 2020 08:35) (18 - 18)  SpO2: 88% (20 Jun 2020 08:35) (88% - 100%)    LABS:    CBC Full  -  ( 19 Jun 2020 06:20 )  WBC Count : 6.64 K/uL  RBC Count : 4.25 M/uL  Hemoglobin : 12.3 g/dL  Hematocrit : 41.0 %  Platelet Count - Automated : 143 K/uL  Mean Cell Volume : 96.5 fl  Mean Cell Hemoglobin : 28.9 pg  Mean Cell Hemoglobin Concentration : 30.0 gm/dL  Auto Neutrophil # : x  Auto Lymphocyte # : x  Auto Monocyte # : x  Auto Eosinophil # : x  Auto Basophil # : x  Auto Neutrophil % : x  Auto Lymphocyte % : x  Auto Monocyte % : x  Auto Eosinophil % : x  Auto Basophil % : x    PT/INR - ( 19 Jun 2020 06:20 )   PT: 16.7 sec;   INR: 1.46 ratio         PTT - ( 19 Jun 2020 06:20 )  PTT:29.3 sec  06-19    134<L>  |  92<L>  |  33<H>  ----------------------------<  130<H>  5.2   |  33<H>  |  5.99<H>    Ca    9.7      19 Jun 2020 06:20  Mg     2.1     06-18    TPro  8.3  /  Alb  3.5  /  TBili  0.8  /  DBili  x   /  AST  12  /  ALT  19  /  AlkPhos  71  06-18    CAPILLARY BLOOD GLUCOSE      POCT Blood Glucose.: 143 mg/dL (20 Jun 2020 07:37)  POCT Blood Glucose.: 174 mg/dL (20 Jun 2020 05:45)  POCT Blood Glucose.: 73 mg/dL (19 Jun 2020 22:48)  POCT Blood Glucose.: 63 mg/dL (19 Jun 2020 22:11)  POCT Blood Glucose.: 60 mg/dL (19 Jun 2020 21:07)  POCT Blood Glucose.: 159 mg/dL (19 Jun 2020 16:53)  POCT Blood Glucose.: 225 mg/dL (19 Jun 2020 13:27)        LIVER FUNCTIONS - ( 18 Jun 2020 16:49 )  Alb: 3.5 g/dL / Pro: 8.3 g/dL / ALK PHOS: 71 U/L / ALT: 19 U/L DA / AST: 12 U/L / GGT: x           Creatinine Trend: 5.99<--, 4.94<--  I&O's Summary          .Blood Blood  06-18 @ 22:15   No growth to date.  --  --          MEDICATIONS:    MEDICATIONS  (STANDING):  aMIOdarone    Tablet 200 milliGRAM(s) Oral daily  ampicillin/sulbactam  IVPB 1.5 Gram(s) IV Intermittent every 12 hours  aspirin enteric coated 81 milliGRAM(s) Oral daily  calcium acetate 667 milliGRAM(s) Oral three times a day with meals  dextrose 5%. 1000 milliLiter(s) (50 mL/Hr) IV Continuous <Continuous>  glycopyrrolate 9 MICROgram(s)/formoterol 4.8 MICROgram(s) Inhaler 2 Puff(s) Inhalation two times a day  insulin glargine Injectable (LANTUS) 10 Unit(s) SubCutaneous at bedtime  insulin lispro (HumaLOG) corrective regimen sliding scale   SubCutaneous three times a day before meals  insulin lispro (HumaLOG) corrective regimen sliding scale   SubCutaneous at bedtime  insulin lispro Injectable (HumaLOG) 3 Unit(s) SubCutaneous three times a day before meals  ketotifen 0.025% Ophthalmic Solution 1 Drop(s) Both EYES two times a day  methimazole 5 milliGRAM(s) Oral daily  metoprolol tartrate 100 milliGRAM(s) Oral two times a day  pantoprazole    Tablet 40 milliGRAM(s) Oral before breakfast  simvastatin 40 milliGRAM(s) Oral at bedtime  tiotropium 18 MICROgram(s) Capsule 1 Capsule(s) Inhalation daily      MEDICATIONS  (PRN):  acetaminophen   Tablet .. 650 milliGRAM(s) Oral every 6 hours PRN Mild Pain (1 - 3)  ALBUTerol    90 MICROgram(s) HFA Inhaler 2 Puff(s) Inhalation every 6 hours PRN Shortness of Breath and/or Wheezing  traMADol 25 milliGRAM(s) Oral every 12 hours PRN Moderate Pain (4 - 6)        REVIEW OF SYSTEMS:                           ALL ROS DONE [ X   ]      CONSTITUTIONAL:  LETHARGIC [   ], FEVER [   ], UNRESPONSIVE [   ]  CVS:  CP  [   ], SOB, [   ], PALPITATIONS [   ], DIZZYNESS [   ]  RS: COUGH [   ], SPUTUM [   ]  GI: ABDOMINAL PAIN [   ], NAUSEA [   ], VOMITINGS [   ], DIARRHEA [   ], CONSTIPATION [   ]  :  DYSURIA [   ], NOCTURIA [   ], INCREASED FREQUENCY [   ], DRIBLING [   ],  SKELETAL: PAINFUL JOINTS [   ], SWOLLEN JOINTS [   ], NECK ACHE [   ], LOW BACK ACHE [   ],  SKIN : ULCERS [   ], RASH [   ], ITCHING [   ]  CNS: HEAD ACHE [   ], DOUBLE VISION [   ], BLURRED VISION [   ], AMS / CONFUSION [   ], SEIZURES [   ], WEAKNESS [   ],TINGLING / NUMBNESS [   ]      PHYSICAL EXAMINATION:    GENERAL APPEARANCE: NO DISTRESS  HEENT:  NO PALLOR, NO  JVD,  NO   NODES, NECK SUPPLE  CVS: S1 +, S2 +,   RS: AEEB,  OCCASIONAL  RALES +,   NO RONCHI  ABD: SOFT, NT, NO, BS +  EXT: PE +, ERYTHEMA WITH INDURATION OF LEFT LEG +  SKIN: WARM,   SKELETAL:  ROM ACCEPTABLE  CNS:  AAO X  3  , NO  DEFICITS      RADIOLOGY :    < from: US Duplex Venous Lower Ext Complete, Bilateral (06.20.20 @ 11:32) >  IMPRESSION:   No evidence of deep venous thrombosis in either lower extremity.    < end of copied text >    < from: Xray Chest 1 View-PORTABLE IMMEDIATE (06.18.20 @ 17:48) >  IMPRESSION: Unchanged chest as above.    < end of copied text >          ASSESSMENT :     Cellulitis  ESRD (end stage renal disease) on dialysis  Stented coronary artery  Hyperthyroidism  CKD (chronic kidney disease)  H/O: CVA  Heart Attack  Coronary Stent  Hypercholesterolemia  CAD (Coronary Artery Disease)  DM (Diabetes Mellitus)  HTN (Hypertension)  S/P CABG x 4  Hemodialysis access, AV graft  S/P cataract extraction  Boil of Buttock      PLAN:  HPI:  69M with CAD s/p multiple stents, s/p CABG (9/2019 course c/b Afib and LT pleural effusion), Diastolic CHF, HTN, HLD, DM2, Afib on coumadin and ESRD on HD (Tu, Thurs, Sat at [facility]), hyperthyroidism, presented to ER for pain and swelling of left leg since 3 days. Patient reports sustaining injury to posterior aspect of left leg 1 month ago when he accidentally hit his leg against his bed. He reports the wound has not healed since. Reports he developed pain and swelling of left leg since 3 days, 9/10 in severity, with difficulty ambulating. Reports he was advised to get doppler which he reviewed with [facility] cardiologist today, was told he has no clot and recommended to come to ER. Denies fever, chills, weakness, cough, dysuria, NVDC. (18 Jun 2020 19:29)    -  CELLULITIS OF LEFT LEG ON IV. UNASYN, AND ADDED PO DOXYCYCLINE. F/UP CXS  -  ESRD ON HD  -  PAD, DIABETIC PERIPHERAL NEUROPATHY  -  GI AND DVT PROPHYLAXIS  -  DR. ASHA DIAZ

## 2020-06-20 NOTE — PROGRESS NOTE ADULT - ATTENDING COMMENTS
Rudy Mendiola MD  [facility]  Office [phone]  Ans Serv [phone] cell - [phone]

## 2020-06-21 ENCOUNTER — TRANSCRIPTION ENCOUNTER (OUTPATIENT)
Age: 70
End: 2020-06-21

## 2020-06-21 LAB
ALBUMIN SERPL ELPH-MCNC: 3 G/DL — LOW (ref 3.5–5)
ALP SERPL-CCNC: 71 U/L — SIGNIFICANT CHANGE UP (ref 40–120)
ALT FLD-CCNC: 27 U/L DA — SIGNIFICANT CHANGE UP (ref 10–60)
ANION GAP SERPL CALC-SCNC: 11 MMOL/L — SIGNIFICANT CHANGE UP (ref 5–17)
APTT BLD: 30 SEC — SIGNIFICANT CHANGE UP (ref 27.5–36.3)
AST SERPL-CCNC: 14 U/L — SIGNIFICANT CHANGE UP (ref 10–40)
BILIRUB SERPL-MCNC: 0.6 MG/DL — SIGNIFICANT CHANGE UP (ref 0.2–1.2)
BUN SERPL-MCNC: 42 MG/DL — HIGH (ref 7–18)
CALCIUM SERPL-MCNC: 9 MG/DL — SIGNIFICANT CHANGE UP (ref 8.4–10.5)
CHLORIDE SERPL-SCNC: 95 MMOL/L — LOW (ref 96–108)
CO2 SERPL-SCNC: 29 MMOL/L — SIGNIFICANT CHANGE UP (ref 22–31)
CREAT SERPL-MCNC: 6.66 MG/DL — HIGH (ref 0.5–1.3)
GLUCOSE BLDC GLUCOMTR-MCNC: 124 MG/DL — HIGH (ref 70–99)
GLUCOSE BLDC GLUCOMTR-MCNC: 137 MG/DL — HIGH (ref 70–99)
GLUCOSE BLDC GLUCOMTR-MCNC: 172 MG/DL — HIGH (ref 70–99)
GLUCOSE BLDC GLUCOMTR-MCNC: 278 MG/DL — HIGH (ref 70–99)
GLUCOSE BLDC GLUCOMTR-MCNC: 81 MG/DL — SIGNIFICANT CHANGE UP (ref 70–99)
GLUCOSE SERPL-MCNC: 134 MG/DL — HIGH (ref 70–99)
HCT VFR BLD CALC: 40.9 % — SIGNIFICANT CHANGE UP (ref 39–50)
HGB BLD-MCNC: 12.5 G/DL — LOW (ref 13–17)
INR BLD: 1.91 RATIO — HIGH (ref 0.88–1.16)
MCHC RBC-ENTMCNC: 28.7 PG — SIGNIFICANT CHANGE UP (ref 27–34)
MCHC RBC-ENTMCNC: 30.6 GM/DL — LOW (ref 32–36)
MCV RBC AUTO: 94 FL — SIGNIFICANT CHANGE UP (ref 80–100)
NRBC # BLD: 0 /100 WBCS — SIGNIFICANT CHANGE UP (ref 0–0)
PLATELET # BLD AUTO: 178 K/UL — SIGNIFICANT CHANGE UP (ref 150–400)
POTASSIUM SERPL-MCNC: 4.5 MMOL/L — SIGNIFICANT CHANGE UP (ref 3.5–5.3)
POTASSIUM SERPL-SCNC: 4.5 MMOL/L — SIGNIFICANT CHANGE UP (ref 3.5–5.3)
PROT SERPL-MCNC: 7.4 G/DL — SIGNIFICANT CHANGE UP (ref 6–8.3)
PROTHROM AB SERPL-ACNC: 22 SEC — HIGH (ref 10–12.9)
RBC # BLD: 4.35 M/UL — SIGNIFICANT CHANGE UP (ref 4.2–5.8)
RBC # FLD: 14.5 % — SIGNIFICANT CHANGE UP (ref 10.3–14.5)
SODIUM SERPL-SCNC: 135 MMOL/L — SIGNIFICANT CHANGE UP (ref 135–145)
WBC # BLD: 7.98 K/UL — SIGNIFICANT CHANGE UP (ref 3.8–10.5)
WBC # FLD AUTO: 7.98 K/UL — SIGNIFICANT CHANGE UP (ref 3.8–10.5)

## 2020-06-21 RX ORDER — CHLORHEXIDINE GLUCONATE 213 G/1000ML
1 SOLUTION TOPICAL DAILY
Refills: 0 | Status: DISCONTINUED | OUTPATIENT
Start: 2020-06-21 | End: 2020-07-15

## 2020-06-21 RX ORDER — WARFARIN SODIUM 2.5 MG/1
5 TABLET ORAL ONCE
Refills: 0 | Status: COMPLETED | OUTPATIENT
Start: 2020-06-21 | End: 2020-06-21

## 2020-06-21 RX ADMIN — TRAMADOL HYDROCHLORIDE 25 MILLIGRAM(S): 50 TABLET ORAL at 04:37

## 2020-06-21 RX ADMIN — AMPICILLIN SODIUM AND SULBACTAM SODIUM 100 GRAM(S): 250; 125 INJECTION, POWDER, FOR SUSPENSION INTRAMUSCULAR; INTRAVENOUS at 18:24

## 2020-06-21 RX ADMIN — TRAMADOL HYDROCHLORIDE 25 MILLIGRAM(S): 50 TABLET ORAL at 22:44

## 2020-06-21 RX ADMIN — Medication 3 UNIT(S): at 12:00

## 2020-06-21 RX ADMIN — Medication 667 MILLIGRAM(S): at 09:03

## 2020-06-21 RX ADMIN — Medication 667 MILLIGRAM(S): at 12:00

## 2020-06-21 RX ADMIN — KETOTIFEN FUMARATE 1 DROP(S): 0.34 SOLUTION OPHTHALMIC at 05:37

## 2020-06-21 RX ADMIN — AMIODARONE HYDROCHLORIDE 200 MILLIGRAM(S): 400 TABLET ORAL at 05:37

## 2020-06-21 RX ADMIN — AMPICILLIN SODIUM AND SULBACTAM SODIUM 100 GRAM(S): 250; 125 INJECTION, POWDER, FOR SUSPENSION INTRAMUSCULAR; INTRAVENOUS at 05:37

## 2020-06-21 RX ADMIN — Medication 100 MILLIGRAM(S): at 18:24

## 2020-06-21 RX ADMIN — Medication 100 MILLIGRAM(S): at 05:39

## 2020-06-21 RX ADMIN — KETOTIFEN FUMARATE 1 DROP(S): 0.34 SOLUTION OPHTHALMIC at 18:24

## 2020-06-21 RX ADMIN — WARFARIN SODIUM 5 MILLIGRAM(S): 2.5 TABLET ORAL at 21:15

## 2020-06-21 RX ADMIN — GLYCOPYRROLATE AND FORMOTEROL FUMARATE 2 PUFF(S): 9; 4.8 AEROSOL, METERED RESPIRATORY (INHALATION) at 21:15

## 2020-06-21 RX ADMIN — TIOTROPIUM BROMIDE 1 CAPSULE(S): 18 CAPSULE ORAL; RESPIRATORY (INHALATION) at 12:31

## 2020-06-21 RX ADMIN — Medication 3 UNIT(S): at 09:03

## 2020-06-21 RX ADMIN — Medication 100 MILLIGRAM(S): at 05:37

## 2020-06-21 RX ADMIN — PANTOPRAZOLE SODIUM 40 MILLIGRAM(S): 20 TABLET, DELAYED RELEASE ORAL at 05:37

## 2020-06-21 RX ADMIN — Medication 667 MILLIGRAM(S): at 18:24

## 2020-06-21 RX ADMIN — Medication 81 MILLIGRAM(S): at 12:31

## 2020-06-21 RX ADMIN — INSULIN GLARGINE 10 UNIT(S): 100 INJECTION, SOLUTION SUBCUTANEOUS at 21:14

## 2020-06-21 RX ADMIN — Medication 3: at 12:00

## 2020-06-21 RX ADMIN — SIMVASTATIN 40 MILLIGRAM(S): 20 TABLET, FILM COATED ORAL at 21:15

## 2020-06-21 RX ADMIN — GLYCOPYRROLATE AND FORMOTEROL FUMARATE 2 PUFF(S): 9; 4.8 AEROSOL, METERED RESPIRATORY (INHALATION) at 09:04

## 2020-06-21 NOTE — DISCHARGE NOTE PROVIDER - NSDCCPCAREPLAN_GEN_ALL_CORE_FT
PRINCIPAL DISCHARGE DIAGNOSIS  Diagnosis: Cellulitis  Assessment and Plan of Treatment: You were admitted for non healing cellulitis and swelling of left leg.  Doppler of your legs was negative for deep vein thrombosis.  You were treated with intravenous and oral antibiotics.  -Please complete antibiotics as prescribed  -Please take all your medications as prescribed  -Please follow up with your primary doctor      SECONDARY DISCHARGE DIAGNOSES  Diagnosis: Heart failure  Assessment and Plan of Treatment: -Take your cardiac medications as prescribed   -Fluid restriction per your cardiologist  -Low sal diet  -Notify your PCP/cardiologist weight gain of >3lbs in 2 days    Diagnosis: DM (Diabetes Mellitus)  Assessment and Plan of Treatment: Your hemoglobin A1C is 8.2 which is too high  -Please check your blood sugar 3x/day  -Take your anti diabetic medications as prescribed    Diagnosis: Afib  Assessment and Plan of Treatment: Take coumadin as prescribed.  Follow up for close PT/INR monitoring  Avoid injury as you may bleed execessively  Limit intake of greens such as Kale, Spinach, [place] sprouts, parsley etc. PRINCIPAL DISCHARGE DIAGNOSIS  Diagnosis: Cellulitis  Assessment and Plan of Treatment: You were admitted for non healing cellulitis and swelling of left leg.  Doppler of your legs was negative for deep vein thrombosis.  You were treated with intravenous and oral antibiotics.  -Please complete antibiotics as prescribed  -Please take all your medications including pain meds as prescribed   -Keep left leg elevated as you tolerated.   -Please follow up with your primary doctor      SECONDARY DISCHARGE DIAGNOSES  Diagnosis: Heart failure  Assessment and Plan of Treatment: -Take your cardiac medications as prescribed   -Fluid restriction per your cardiologist  -Low sal diet  -Notify your PCP/cardiologist weight gain of >3lbs in 2 days    Diagnosis: DM (Diabetes Mellitus)  Assessment and Plan of Treatment: Your hemoglobin A1C is 8.2 which is too high  -Please check your blood sugar 3x/day  -Take your anti diabetic medications as prescribed    Diagnosis: Afib  Assessment and Plan of Treatment: Take coumadin as prescribed.  Follow up with your PCP/cardiologist for close PT/INR monitoring  Avoid injury as you may bleed execessively  Limit intake of greens such as Kale, Spinach, [place] sprouts, parsley etc.    Diagnosis: ESRD (end stage renal disease) on dialysis  Assessment and Plan of Treatment: Continue dialysis 3x/week.  Take medications as prescribed.   Follow up with your nephrologist. PRINCIPAL DISCHARGE DIAGNOSIS  Diagnosis: Cellulitis  Assessment and Plan of Treatment: You were admitted for non healing cellulitis and swelling of left leg.  Doppler of your legs was negative for deep vein thrombosis.  You were treated with intravenous and oral antibiotics.  -Please complete antibiotics as prescribed  -Please take all your medications including pain meds as prescribed   -Keep left leg elevated as you tolerated.   -Please follow up with your primary doctor        SECONDARY DISCHARGE DIAGNOSES  Diagnosis: ESRD (end stage renal disease) on dialysis  Assessment and Plan of Treatment: Continue dialysis 3x/week.  Take medications as prescribed.   Follow up with your nephrologist.       Diagnosis: Heart failure  Assessment and Plan of Treatment: -Take your cardiac medications as prescribed   -Fluid restriction per your cardiologist  -Low sal diet  -Notify your PCP/cardiologist weight gain of >3lbs in 2 days    Diagnosis: DM (Diabetes Mellitus)  Assessment and Plan of Treatment: Your hemoglobin A1C is 8.2 which is too high  -Please check your blood sugar 3x/day  -Take your anti diabetic medications as prescribed    Diagnosis: Afib  Assessment and Plan of Treatment: Take coumadin as prescribed.  Follow up with your PCP/cardiologist for close PT/INR monitoring  Avoid injury as you may bleed execessively  Limit intake of greens such as Kale, Spinach, [place] sprouts, parsley etc. PRINCIPAL DISCHARGE DIAGNOSIS  Diagnosis: Cellulitis  Assessment and Plan of Treatment: You were admitted for non healing cellulitis and swelling of left leg.  Doppler of your legs was negative for deep vein thrombosis.  You were treated with intravenous and oral antibiotics.  -Please complete antibiotics as prescribed - 5more doses during dialysis and check vanco trough prior to 3 rd dose   -Please take all your medications including pain meds as prescribed   -Keep left leg elevated as you tolerated.   -Please follow up with your primary doctor        SECONDARY DISCHARGE DIAGNOSES  Diagnosis: ESRD (end stage renal disease) on dialysis  Assessment and Plan of Treatment: Continue dialysis 3x/week.  Take medications as prescribed.   Follow up with your nephrologist.       Diagnosis: Heart failure  Assessment and Plan of Treatment: -Take your cardiac medications as prescribed   -Fluid restriction per your cardiologist  -Low sal diet  -Notify your PCP/cardiologist weight gain of >3lbs in 2 days  - use home oxygen while ambulation to prevent hypoxia    Diagnosis: DM (Diabetes Mellitus)  Assessment and Plan of Treatment: Your hemoglobin A1C is 8.2 which is too high  -Please check your blood sugar 3x/day  -Take your anti diabetic medications as prescribed    Diagnosis: Afib  Assessment and Plan of Treatment: Take coumadin as prescribed - 3mg at bedtime for 1 week and follow up with primary physician   Follow up with your PCP/cardiologist for close PT/INR monitoring  Avoid injury as you may bleed execessively  Limit intake of greens such as Kale, Spinach, [place] sprouts, parsley etc. PRINCIPAL DISCHARGE DIAGNOSIS  Diagnosis: Cellulitis  Assessment and Plan of Treatment: You were admitted for non healing cellulitis and swelling of left leg.  Doppler of your legs was negative for deep vein thrombosis.  You were treated with intravenous and oral antibiotics.  -Please complete antibiotics as prescribed - 2 more doses during dialysis which is 7.16 and 7.18   -Please take all your medications including pain meds as prescribed   -Keep left leg elevated as you tolerated.   -Please follow up with your primary doctor        SECONDARY DISCHARGE DIAGNOSES  Diagnosis: ESRD (end stage renal disease) on dialysis  Assessment and Plan of Treatment: Continue dialysis 3x/week.  Take medications as prescribed.   Follow up with your nephrologist.       Diagnosis: Heart failure  Assessment and Plan of Treatment: -Take your cardiac medications as prescribed   -Fluid restriction per your cardiologist  -Low sal diet  -Notify your PCP/cardiologist weight gain of >3lbs in 2 days  - use home oxygen while ambulation to prevent hypoxia    Diagnosis: DM (Diabetes Mellitus)  Assessment and Plan of Treatment: Your hemoglobin A1C is 8.2 which is too high  -Please check your blood sugar 3x/day  -Take your anti diabetic medications as prescribed    Diagnosis: Afib  Assessment and Plan of Treatment: Take coumadin as prescribed - 3mg at bedtime for 1 week and follow up with primary physician   Follow up with your PCP/cardiologist for close PT/INR monitoring  Avoid injury as you may bleed execessively  Limit intake of greens such as Kale, Spinach, [place] sprouts, parsley etc.

## 2020-06-21 NOTE — DISCHARGE NOTE PROVIDER - NSDCMRMEDTOKEN_GEN_ALL_CORE_FT
amiodarone 200 mg oral tablet: 1 tab(s) orally once a day   aspirin 81 mg oral delayed release tablet: 1 tab(s) orally once a day  azelastine 0.05% ophthalmic solution: 1 drop(s) to each affected eye 2 times a day  Bevespi Aerosphere 9 mcg-4.8 mcg/inh inhalation aerosol: 2 puff(s) inhaled 2 times a day  calcium acetate 667 mg oral tablet: 1 tab(s) orally 3 times a day  Coumadin 3 mg oral tablet: 1 tab(s) orally once a day   HumaLOG KwikPen 100 units/mL injectable solution: 5 unit(s) injectable 3 times a day (with meals)   Lantus 100 units/mL subcutaneous solution: 14 unit(s) subcutaneous once a day (at bedtime)   methIMAzole 5 mg oral tablet: 1 tab(s) orally once a day  metoprolol succinate 100 mg oral tablet, extended release: 1 tab(s) orally every 12 hours  omeprazole 40 mg oral delayed release capsule: 1 cap(s) orally once a day  ProAir RespiClick 90 mcg/inh inhalation powder: 2 puff(s) inhaled every 4 hours, As Needed  simvastatin 40 mg oral tablet: 1 tab(s) orally once a day (at bedtime)  Spiriva Respimat: 2.5 microgram(s) inhaled 2 times a day amiodarone 200 mg oral tablet: 1 tab(s) orally once a day   aspirin 81 mg oral delayed release tablet: 1 tab(s) orally once a day  azelastine 0.05% ophthalmic solution: 1 drop(s) to each affected eye 2 times a day  Bevespi Aerosphere 9 mcg-4.8 mcg/inh inhalation aerosol: 2 puff(s) inhaled 2 times a day  calcium acetate 667 mg oral tablet: 1 tab(s) orally 3 times a day  Coumadin 3 mg oral tablet: 1 tab(s) orally once a day   HumaLOG KwikPen 100 units/mL injectable solution: 5 unit(s) injectable 3 times a day (with meals)   Lantus 100 units/mL subcutaneous solution: 14 unit(s) subcutaneous once a day (at bedtime)   methIMAzole 5 mg oral tablet: 1 tab(s) orally once a day  metoprolol succinate 100 mg oral tablet, extended release: 1 tab(s) orally every 12 hours  omeprazole 40 mg oral delayed release capsule: 1 cap(s) orally once a day  ProAir RespiClick 90 mcg/inh inhalation powder: 2 puff(s) inhaled every 4 hours, As Needed  senna oral tablet: 2 tab(s) orally once a day (at bedtime)  simvastatin 40 mg oral tablet: 1 tab(s) orally once a day (at bedtime)  Spiriva Respimat: 2.5 microgram(s) inhaled 2 times a day amiodarone 200 mg oral tablet: 1 tab(s) orally once a day   aspirin 81 mg oral delayed release tablet: 1 tab(s) orally once a day  azelastine 0.05% ophthalmic solution: 1 drop(s) to each affected eye 2 times a day  Bevespi Aerosphere 9 mcg-4.8 mcg/inh inhalation aerosol: 2 puff(s) inhaled 2 times a day  calcium acetate 667 mg oral tablet: 1 tab(s) orally 3 times a day  Coumadin 3 mg oral tablet: 1 tab(s) orally once a day   HumaLOG KwikPen 100 units/mL injectable solution: 5 unit(s) injectable 3 times a day (with meals)   Lantus 100 units/mL subcutaneous solution: 14 unit(s) subcutaneous once a day (at bedtime)   methIMAzole 5 mg oral tablet: 1 tab(s) orally once a day  metoprolol succinate 100 mg oral tablet, extended release: 1 tab(s) orally every 12 hours  omeprazole 40 mg oral delayed release capsule: 1 cap(s) orally once a day  Oxygen :   ProAir RespiClick 90 mcg/inh inhalation powder: 2 puff(s) inhaled every 4 hours, As Needed  senna oral tablet: 2 tab(s) orally once a day (at bedtime)  simvastatin 40 mg oral tablet: 1 tab(s) orally once a day (at bedtime)  Spiriva Respimat: 2.5 microgram(s) inhaled 2 times a day acetaminophen 325 mg oral tablet: 2 tab(s) orally every 6 hours, As needed, Moderate Pain (4 - 6)  amiodarone 200 mg oral tablet: 1 tab(s) orally once a day   aspirin 81 mg oral delayed release tablet: 1 tab(s) orally once a day  azelastine 0.05% ophthalmic solution: 1 drop(s) to each affected eye 2 times a day  Bevespi Aerosphere 9 mcg-4.8 mcg/inh inhalation aerosol: 2 puff(s) inhaled 2 times a day  calcium acetate 667 mg oral tablet: 1 tab(s) orally 3 times a day  Coumadin 3 mg oral tablet: 1 tab(s) orally once a day   HumaLOG KwikPen 100 units/mL injectable solution: 5 unit(s) injectable 3 times a day (with meals)   Lantus 100 units/mL subcutaneous solution: 14 unit(s) subcutaneous once a day (at bedtime)   methIMAzole 5 mg oral tablet: 1 tab(s) orally once a day  metoprolol succinate 100 mg oral tablet, extended release: 1 tab(s) orally every 12 hours  omeprazole 40 mg oral delayed release capsule: 1 cap(s) orally once a day  Oxygen :   ProAir RespiClick 90 mcg/inh inhalation powder: 2 puff(s) inhaled every 4 hours, As Needed  senna oral tablet: 2 tab(s) orally once a day (at bedtime)  simvastatin 40 mg oral tablet: 1 tab(s) orally once a day (at bedtime)  Spiriva Respimat: 2.5 microgram(s) inhaled 2 times a day  traMADol 50 mg oral tablet: 1 tab(s) orally every 6 hours, As needed, Severe Pain (7 - 10) MDD:4tabs  vanco trough on 7,21 prior to dose :   vancomycin 1 g intravenous injection: 1 gram(s) intravenous every 48 hours acetaminophen 325 mg oral tablet: 2 tab(s) orally every 6 hours, As needed, Moderate Pain (4 - 6)  amiodarone 200 mg oral tablet: 1 tab(s) orally once a day   aspirin 81 mg oral delayed release tablet: 1 tab(s) orally once a day  azelastine 0.05% ophthalmic solution: 1 drop(s) to each affected eye 2 times a day  Bevespi Aerosphere 9 mcg-4.8 mcg/inh inhalation aerosol: 2 puff(s) inhaled 2 times a day  calcium acetate 667 mg oral tablet: 1 tab(s) orally 3 times a day  Coumadin 3 mg oral tablet: 1 tab(s) orally once a day   HumaLOG KwikPen 100 units/mL injectable solution: 5 unit(s) injectable 3 times a day (with meals)   Lantus 100 units/mL subcutaneous solution: 14 unit(s) subcutaneous once a day (at bedtime)   methIMAzole 5 mg oral tablet: 1 tab(s) orally once a day  metoprolol succinate 100 mg oral tablet, extended release: 1 tab(s) orally every 12 hours  omeprazole 40 mg oral delayed release capsule: 1 cap(s) orally once a day  Oxygen :   ProAir RespiClick 90 mcg/inh inhalation powder: 2 puff(s) inhaled every 4 hours, As Needed  senna oral tablet: 2 tab(s) orally once a day (at bedtime)  simvastatin 40 mg oral tablet: 1 tab(s) orally once a day (at bedtime)  Spiriva Respimat: 2.5 microgram(s) inhaled 2 times a day  traMADol 50 mg oral tablet: 1 tab(s) orally every 6 hours, As needed, Severe Pain (7 - 10) MDD:4tabs  vancomycin 1 g intravenous injection: 1 gram(s) intravenous every 48 hours

## 2020-06-21 NOTE — PROGRESS NOTE ADULT - SUBJECTIVE AND OBJECTIVE BOX
Patient is a 69y old  Male who presents with a chief complaint of leg pain (21 Jun 2020 12:24)    PATIENT IS SEEN AND EXAMINED IN MEDICAL FLOOR.      ALLERGIES:  lisinopril (Other)        VITALS:    Vital Signs Last 24 Hrs  T(C): 36.8 (21 Jun 2020 16:12), Max: 36.8 (21 Jun 2020 16:12)  T(F): 98.2 (21 Jun 2020 16:12), Max: 98.2 (21 Jun 2020 16:12)  HR: 73 (21 Jun 2020 16:12) (67 - 85)  BP: 115/65 (21 Jun 2020 16:12) (115/65 - 125/75)  BP(mean): --  RR: 18 (21 Jun 2020 16:12) (17 - 18)  SpO2: 96% (21 Jun 2020 16:12) (93% - 98%)    LABS:    CBC Full  -  ( 21 Jun 2020 06:17 )  WBC Count : 7.98 K/uL  RBC Count : 4.35 M/uL  Hemoglobin : 12.5 g/dL  Hematocrit : 40.9 %  Platelet Count - Automated : 178 K/uL  Mean Cell Volume : 94.0 fl  Mean Cell Hemoglobin : 28.7 pg  Mean Cell Hemoglobin Concentration : 30.6 gm/dL  Auto Neutrophil # : x  Auto Lymphocyte # : x  Auto Monocyte # : x  Auto Eosinophil # : x  Auto Basophil # : x  Auto Neutrophil % : x  Auto Lymphocyte % : x  Auto Monocyte % : x  Auto Eosinophil % : x  Auto Basophil % : x    PT/INR - ( 21 Jun 2020 06:17 )   PT: 22.0 sec;   INR: 1.91 ratio         PTT - ( 21 Jun 2020 06:17 )  PTT:30.0 sec  06-21    135  |  95<L>  |  42<H>  ----------------------------<  134<H>  4.5   |  29  |  6.66<H>    Ca    9.0      21 Jun 2020 06:17    TPro  7.4  /  Alb  3.0<L>  /  TBili  0.6  /  DBili  x   /  AST  14  /  ALT  27  /  AlkPhos  71  06-21    CAPILLARY BLOOD GLUCOSE      POCT Blood Glucose.: 172 mg/dL (21 Jun 2020 21:09)  POCT Blood Glucose.: 81 mg/dL (21 Jun 2020 16:30)  POCT Blood Glucose.: 278 mg/dL (21 Jun 2020 11:39)  POCT Blood Glucose.: 137 mg/dL (21 Jun 2020 07:52)  POCT Blood Glucose.: 124 mg/dL (21 Jun 2020 07:22)        LIVER FUNCTIONS - ( 21 Jun 2020 06:17 )  Alb: 3.0 g/dL / Pro: 7.4 g/dL / ALK PHOS: 71 U/L / ALT: 27 U/L DA / AST: 14 U/L / GGT: x           Creatinine Trend: 6.66<--, 8.05<--, 5.99<--, 4.94<--  I&O's Summary    20 Jun 2020 07:01  -  21 Jun 2020 07:00  --------------------------------------------------------  IN: 400 mL / OUT: 2002 mL / NET: -1602 mL            .Blood Blood  06-18 @ 22:15   No growth to date.  --  --          MEDICATIONS:    MEDICATIONS  (STANDING):  aMIOdarone    Tablet 200 milliGRAM(s) Oral daily  ampicillin/sulbactam  IVPB 1.5 Gram(s) IV Intermittent every 12 hours  aspirin enteric coated 81 milliGRAM(s) Oral daily  calcium acetate 667 milliGRAM(s) Oral three times a day with meals  chlorhexidine 2% Cloths 1 Application(s) Topical daily  dextrose 5%. 1000 milliLiter(s) (50 mL/Hr) IV Continuous <Continuous>  doxycycline hyclate Capsule 100 milliGRAM(s) Oral every 12 hours  glycopyrrolate 9 MICROgram(s)/formoterol 4.8 MICROgram(s) Inhaler 2 Puff(s) Inhalation two times a day  insulin glargine Injectable (LANTUS) 10 Unit(s) SubCutaneous at bedtime  insulin lispro (HumaLOG) corrective regimen sliding scale   SubCutaneous three times a day before meals  insulin lispro (HumaLOG) corrective regimen sliding scale   SubCutaneous at bedtime  insulin lispro Injectable (HumaLOG) 3 Unit(s) SubCutaneous three times a day before meals  ketotifen 0.025% Ophthalmic Solution 1 Drop(s) Both EYES two times a day  methimazole 5 milliGRAM(s) Oral daily  metoprolol tartrate 100 milliGRAM(s) Oral two times a day  pantoprazole    Tablet 40 milliGRAM(s) Oral before breakfast  simvastatin 40 milliGRAM(s) Oral at bedtime  tiotropium 18 MICROgram(s) Capsule 1 Capsule(s) Inhalation daily      MEDICATIONS  (PRN):  acetaminophen   Tablet .. 650 milliGRAM(s) Oral every 6 hours PRN Mild Pain (1 - 3)  ALBUTerol    90 MICROgram(s) HFA Inhaler 2 Puff(s) Inhalation every 6 hours PRN Shortness of Breath and/or Wheezing  traMADol 25 milliGRAM(s) Oral every 12 hours PRN Moderate Pain (4 - 6)        REVIEW OF SYSTEMS:                           ALL ROS DONE [ X   ]      CONSTITUTIONAL:  LETHARGIC [   ], FEVER [   ], UNRESPONSIVE [   ]  CVS:  CP  [   ], SOB, [   ], PALPITATIONS [   ], DIZZYNESS [   ]  RS: COUGH [   ], SPUTUM [   ]  GI: ABDOMINAL PAIN [   ], NAUSEA [   ], VOMITINGS [   ], DIARRHEA [   ], CONSTIPATION [   ]  :  DYSURIA [   ], NOCTURIA [   ], INCREASED FREQUENCY [   ], DRIBLING [   ],  SKELETAL: PAINFUL JOINTS [   ], SWOLLEN JOINTS [   ], NECK ACHE [   ], LOW BACK ACHE [   ],  SKIN : ULCERS [   ], RASH [   ], ITCHING [   ]  CNS: HEAD ACHE [   ], DOUBLE VISION [   ], BLURRED VISION [   ], AMS / CONFUSION [   ], SEIZURES [   ], WEAKNESS [   ],TINGLING / NUMBNESS [   ]      PHYSICAL EXAMINATION:    GENERAL APPEARANCE: NO DISTRESS  HEENT:  NO PALLOR, NO  JVD,  NO   NODES, NECK SUPPLE  CVS: S1 +, S2 +,   RS: AEEB,  OCCASIONAL  RALES +,   NO RONCHI  ABD: SOFT, NT, NO, BS +  EXT: PE +, ERYTHEMA WITH INDURATION OF LEFT LEG +  SKIN: WARM,   SKELETAL:  ROM ACCEPTABLE  CNS:  AAO X  3  , NO  DEFICITS      RADIOLOGY :    < from: US Duplex Venous Lower Ext Complete, Bilateral (06.20.20 @ 11:32) >  IMPRESSION:   No evidence of deep venous thrombosis in either lower extremity.    < end of copied text >    < from: Xray Chest 1 View-PORTABLE IMMEDIATE (06.18.20 @ 17:48) >  IMPRESSION: Unchanged chest as above.    < end of copied text >          ASSESSMENT :     Cellulitis  ESRD (end stage renal disease) on dialysis  Stented coronary artery  Hyperthyroidism  CKD (chronic kidney disease)  H/O: CVA  Heart Attack  Coronary Stent  Hypercholesterolemia  CAD (Coronary Artery Disease)  DM (Diabetes Mellitus)  HTN (Hypertension)  S/P CABG x 4  Hemodialysis access, AV graft  S/P cataract extraction  Boil of Buttock      PLAN:  HPI:  69M with CAD s/p multiple stents, s/p CABG (9/2019 course c/b Afib and LT pleural effusion), Diastolic CHF, HTN, HLD, DM2, Afib on coumadin and ESRD on HD (Tu, Thurs, Sat at [facility]), hyperthyroidism, presented to ER for pain and swelling of left leg since 3 days. Patient reports sustaining injury to posterior aspect of left leg 1 month ago when he accidentally hit his leg against his bed. He reports the wound has not healed since. Reports he developed pain and swelling of left leg since 3 days, 9/10 in severity, with difficulty ambulating. Reports he was advised to get doppler which he reviewed with [facility] cardiologist today, was told he has no clot and recommended to come to ER. Denies fever, chills, weakness, cough, dysuria, NVDC. (18 Jun 2020 19:29)    -  CELLULITIS OF LEFT LEG ON IV. UNASYN, AND ADDED PO DOXYCYCLINE. BLOOD  CXS NGTD.  IF PATIENT DOES NOT IMPROVE WELL BY TOMORROW , WILL CONSIDER SWITCHING HIM TO IV. ZOSYN AND VANCOMYCIN. ID F/UP IS IN PROGRESS   -  ESRD ON HD  -  PAD, DIABETIC PERIPHERAL NEUROPATHY  -  GI AND DVT PROPHYLAXIS  -  DR. ASHA DIAZ

## 2020-06-21 NOTE — DISCHARGE NOTE PROVIDER - HOSPITAL COURSE
69M with CAD s/p multiple stents, s/p CABG (9/2019 course c/b Afib and LT pleural effusion), Diastolic CHF, HTN, HLD, DM2, Afib on coumadin and ESRD on HD (Tu, Thurs, Sat at [facility]), hyperthyroidism, presented to ER for pain and swelling of left leg since 3 days. Patient reports sustaining injury to posterior aspect of left leg 1 month ago when he accidentally hit his leg against his bed. He reports the wound has not healed since. Reports he developed pain and swelling of left leg since 3 days, 9/10 in severity, with difficulty ambulating. Reports he was advised to get doppler which he reviewed with his cardiologist today, was told he has no clot and recommended to come to ER. Denies fever, chills, weakness, cough, dysuria, NVDC.         CXR 6/18->Gross heart enlargement and mild to moderate left base pleural pulmonary reaction again noted. Sternotomy again seen.    US LE VEINS 11/16->No evidence of deep venous thrombosis in either lower extremity.        Pt. admitted to medicine for cellulitis of LLE 69M with CAD s/p multiple stents, s/p CABG (9/2019 course c/b Afib and LT pleural effusion), Diastolic CHF, HTN, HLD, DM2, Afib on coumadin and ESRD on HD (Tu, Thurs, Sat at [facility]), hyperthyroidism, presented to ER for pain and swelling of left leg since 3 days. Patient reports sustaining injury to posterior aspect of left leg 1 month ago when he accidentally hit his leg against his bed. He reports the wound has not healed since. Reports he developed pain and swelling of left leg since 3 days, 9/10 in severity, with difficulty ambulating. Reports he was advised to get doppler which he reviewed with his cardiologist today, was told he has no clot and recommended to come to ER. Denies fever, chills, weakness, cough, dysuria, NVDC.         CXR 6/18->Gross heart enlargement and mild to moderate left base pleural pulmonary reaction again noted. Sternotomy again seen.    US LE VEINS 11/16->No evidence of deep venous thrombosis in either lower extremity.        Pt. admitted to medicine for cellulitis of LLE, treated with IV Unasyn and PO Doxy, blood Cx NTD.    Pt. with ESRD on HD, followed by nephro Dr. Calle, HD tolerated.    Pt. with significant cardiac Hx including AF on coumadin, followed by vero Jade, Coumadin dose adjusted for goal 2-3.  Pt. with severe reversible  diastolic dysfunction stage III on 1/22/20 ECHO, EF was estimated 55%.        NOT COMPLETE 69M with CAD s/p multiple stents, s/p CABG (9/2019 course c/b Afib and LT pleural effusion), Diastolic CHF, HTN, HLD, DM2, Afib on coumadin and ESRD on HD (Tu, Thurs, Sat at [facility]), hyperthyroidism, presented to ER for pain and swelling of left leg since 3 days. Patient reports sustaining injury to posterior aspect of left leg 1 month ago when he accidentally hit his leg against his bed. He reports the wound has not healed since. Reports he developed pain and swelling of left leg since 3 days, 9/10 in severity, with difficulty ambulating. Reports he was advised to get doppler which he reviewed with his cardiologist today, was told he has no clot and recommended to come to ER. Denies fever, chills, weakness, cough, dysuria, NVDC.         CXR 6/18->Gross heart enlargement and mild to moderate left base pleural pulmonary reaction again noted. Sternotomy again seen.    US LE VEINS 11/16->No evidence of deep venous thrombosis in either lower extremity.        Pt. admitted to medicine for cellulitis of LLE, treated with IV Unasyn and PO Doxy, blood Cx NTD.    Pt. with ESRD on HD, followed by nephro Dr. Calle, HD tolerated.    Pt. with significant cardiac Hx including AF on coumadin, followed by vero Jade, Coumadin dose adjusted for goal 2-3.  Pt. with severe reversible  diastolic dysfunction stage III on 1/22/20 ECHO, EF was estimated 55%.        NOT COMPLETE    last update 6/24         69M with CAD s/p multiple stents, s/p CABG (9/2019 course c/b Afib and LT pleural effusion), Diastolic CHF, HTN, HLD, DM2, Afib on coumadin and ESRD on HD (Tu, Thurs, Sat at [facility]), hyperthyroidism, presented to ER for pain and swelling of left leg since 3 days. Admited for cellulitis, started on Unasyn and Doxy. Followed by  Nepho Dr. Calle.     Pt is afebrile and no leukocytosis but cellulitis is not improving. negative for DVT on US. s/p X ray shows no acute fx and no soft tissue swelling.  ID consulted. Abt changed to Vancomycin per dr. Rayo.  Plan for discharge with vanco when leg swelling is improving.     Continues on HD (T/Th/S).  Echo to be done as per cardiology.     Pain management consulted for unrelieved lower leg pain w/ current meds.         INTERVAL HPI/OVERNIGHT EVENTS:  seen at bedside. c/o left lower leg pain, could not sleep well over night due to pain. spoke to son Randal-concerns about starting percocet or morphine for pain control due to previous hx of delirium/behavior changes. A 69M with CAD s/p multiple stents, s/p CABG (9/2019 course c/b Afib and LT pleural effusion), Diastolic CHF, HTN, HLD, DM2, Afib on coumadin and ESRD on HD (Tu, Thurs, Sat at [facility]), hyperthyroidism, presented to ER for pain and swelling of left leg since 3 days. Admitted to medicine for cellulitis.  CXR shows gross heart enlargement and mild to moderate left base pleural pulmonary reaction again noted. Sternotomy again seen. Negative for DVT on Doppler study. Blood Cx NGTD. Pt was started on Unasyn and po Doxy, but changed to Vancomycin due to non healing cellulitis. ID consulted.  Improved leg swelling and erythema with Vanco.  Plan to d/c home with Vancomycin to be given during HD as per ID.     Pt. with ESRD on HD, followed by nephro Dr. Calle, HD tolerated.     Pt. with significant cardiac hx including AF on Coumadin, followed by cardiology. Coumadin dose adjusted for goal INR bet. 2-3. ECHO resulted EF 40-45% (previous EF 55% in Jan. 2020).     Patient evaluated by pain management for left lower leg pain. Pain controlled w/ current pain medications.         Pt is medically optimized for discharge home.                        NOT COMPLETE    last update 6/24 A 69M with CAD s/p multiple stents, s/p CABG (9/2019 course c/b Afib and LT pleural effusion), Diastolic CHF, HTN, HLD, DM2, Afib on coumadin and ESRD on HD (Tu, Thurs, Sat at [facility]), hyperthyroidism, presented to ER for pain and swelling of left leg since 3 days. Admitted to medicine for cellulitis.  CXR shows gross heart enlargement and mild to moderate left base pleural pulmonary reaction again noted. Sternotomy again seen. Negative for DVT on Doppler study. Blood Cx NGTD. Pt was started on Unasyn and po Doxy, but changed to Vancomycin due to non healing cellulitis. ID consulted.  Improved leg swelling and erythema with Vanco.  Plan to d/c home with Vancomycin to be given during HD as per ID.     Pt. with ESRD on HD, followed by nephro Dr. Calle, HD tolerated.     Pt. with significant cardiac hx including AF on Coumadin, followed by cardiology. Coumadin dose adjusted for goal INR bet. 2-3. ECHO resulted EF 40-45% (previous EF 55% in Jan. 2020).     Patient evaluated by pain management for left lower leg pain. Pain controlled w/ current pain medications.         < from: CT Chest No Cont (06.27.20 @ 09:21) >        There is a large loculated left pleural effusion which is increased in size since the previous exam. There is associated compressive atelectasis.        < end of copied text >        Pulmonary and surgery consulted.  Non-emergent chest tube to be placed when INR WNL.                NOT COMPLETE    last update 6/27 69M with CAD s/p multiple stents, s/p CABG (9/2019 course c/b Afib and LT pleural effusion), Diastolic CHF, HTN, HLD, DM2, Afib on coumadin and ESRD on HD (Tu, Thurs, Sat at [facility]), hyperthyroidism, presented to ER for pain and swelling of left leg since 3 days. Admitted to medicine for cellulitis.  CXR shows gross heart enlargement and mild to moderate left base pleural pulmonary reaction again noted. Sternotomy again seen. Negative for DVT on Doppler study. Blood Cx NGTD. Pt was started on Unasyn and po Doxy, but changed to Vancomycin due to non healing cellulitis. ID consulted.  Improved leg swelling and erythema with Vanco.      Pt. with ESRD on HD, followed by nephjohnson Calle, HD tolerated.     Pt. with significant cardiac hx including AF on Coumadin, followed by cardiology. Coumadin dose adjusted for goal INR bet. 2-3. ECHO resulted EF 40-45% (previous EF 55% in Jan. 2020).     Patient evaluated by pain management for left lower leg pain. Pain controlled w/ current pain medications.         CT chest found to have large L loculated pleural effusion, s/p thoracentesis draining 150cc of blood tinged pleural fluid. Thoracic  surgery following, s/p chest tube insertion on 7/2. Repeat chest xray shows improvement of pleural effusion, Heparin drip resumed. nephjohnson Calle following. CT remains with increased output. hospital course complicated with worsening delirium, CT head done and showed no acute pathology.                 NOT COMPLETE    last update 07/05 69M with CAD s/p multiple stents, s/p CABG (9/2019 course c/b Afib and LT pleural effusion), Diastolic CHF, HTN, HLD, DM2, Afib on coumadin and ESRD on HD (Tu, Thurs, Sat at [facility]), hyperthyroidism, presented to ER for pain and swelling of left leg since 3 days. Admitted to medicine for cellulitis.  CXR shows gross heart enlargement and mild to moderate left base pleural pulmonary reaction again noted. Sternotomy again seen. Negative for DVT on Doppler study. Blood Cx NGTD. Pt was started on Unasyn and po Doxy, but changed to Vancomycin due to non healing cellulitis. ID Dr. Rayo consulted.  Improved leg swelling and erythema with Vanco.      Pt. with ESRD on HD, followed by nephro Dr. Calle, HD tolerated.     Pt. with significant cardiac hx including AF on Coumadin, followed by cardiology. Coumadin dose adjusted for goal INR bet. 2-3. ECHO resulted EF 40-45% (previous EF 55% in Jan. 2020).     Patient evaluated by pain management for left lower leg pain. Pain controlled w/ current pain medications.     CT chest found to have large L loculated pleural effusion, s/p thoracentesis draining 150cc of blood tinged pleural fluid. Thoracic  surgery following, s/p chest tube insertion on 7/2. Repeat chest xray shows improvement of pleural effusion, Heparin drip resumed. nephro Dr. Calle following. CT remains with increased output. hospital course complicated with worsening delirium, CT head done and showed no acute pathology.     Also found to have worsening left arm pain and edema, Vascular surgery was consulted and  recommended..........ID was re consulted         Please note that this is a brief summary of hospital course, this pt had a very extended and complicated hospital course, for complete/detailed events, please refer to daily consult and progress notes                 NOT COMPLETE    last update 07/7 69M with CAD s/p multiple stents, s/p CABG (9/2019 course c/b Afib and LT pleural effusion), Diastolic CHF, HTN, HLD, DM2, Afib on coumadin and ESRD on HD (Tu, Thurs, Sat at [facility]), hyperthyroidism, presented to ER for pain and swelling of left leg since 3 days. Admitted to medicine for cellulitis.  CXR shows gross heart enlargement and mild to moderate left base pleural pulmonary reaction again noted. Sternotomy again seen. Negative for DVT on Doppler study. Blood Cx NGTD. Pt was started on Unasyn and po Doxy, but changed to Vancomycin due to non healing cellulitis. ID Dr. Rayo consulted.  Improved leg swelling and erythema with Vanco.      Pt. with ESRD on HD, followed by nephro Dr. Calle, HD tolerated.     Pt. with significant cardiac hx including AF on Coumadin, followed by cardiology. Coumadin dose adjusted for goal INR bet. 2-3. ECHO resulted EF 40-45% (previous EF 55% in Jan. 2020).     Patient evaluated by pain management for left lower leg pain. Pain controlled w/ current pain medications.     CT chest found to have large L loculated pleural effusion, s/p thoracentesis draining 150cc of blood tinged pleural fluid. Thoracic  surgery following, s/p chest tube insertion on 7/2 and removed on 7.7. latest CXR  showed no pneumothorax.  heparin drip d/minerva and switched to P.O coumadin with INR monitoring.     Hospital course complicated with worsening delirium, CT head done and showed no acute pathology.     Also found to have worsening left arm pain and edema, Vascular surgery was consulted and  Venous doppler was perforrmed, NO DVT found, no further w/u recommended as per vascular.     PT evaluated and recommended home with home PT. While PT evaluated pt his O2 saturation dropped to 84%, home O2 ordered for hypoxia while ambulation.     Please note that this is a brief summary of hospital course, this pt had a very extended and complicated hospital course, for complete/detailed events, please refer to daily consult and progress notes         Given patient's improved clinical status and current hemodynamic stability decision was made to discharge. Pt is stable for discharge per attending and is advised to f/u with PCP as out-patient. Please refer to Pt's complete medical chart with documents for a full hospital course, for this is only a brief summary.    updated on 7.13     ID for vanco length 69M with CAD s/p multiple stents, s/p CABG (9/2019 course c/b Afib and LT pleural effusion), Diastolic CHF, HTN, HLD, DM2, Afib on coumadin and ESRD on HD (Tu, Thurs, Sat at [facility]), hyperthyroidism, presented to ER for pain and swelling of left leg since 3 days. Admitted to medicine for cellulitis.  CXR shows gross heart enlargement and mild to moderate left base pleural pulmonary reaction again noted. Sternotomy again seen. Negative for DVT on Doppler study. Blood Cx NGTD. Pt was started on Unasyn and po Doxy, but changed to Vancomycin due to non healing cellulitis. ID Dr. Rayo consulted.  Improved leg swelling and erythema with Vanco.      Pt. with ESRD on HD, followed by nephro Dr. Calle, HD tolerated.     Pt. with significant cardiac hx including AF on Coumadin, followed by cardiology. Coumadin dose adjusted for goal INR bet. 2-3. ECHO resulted EF 40-45% (previous EF 55% in Jan. 2020).     Patient evaluated by pain management for left lower leg pain. Pain controlled w/ current pain medications.     CT chest found to have large L loculated pleural effusion, s/p thoracentesis draining 150cc of blood tinged pleural fluid. Thoracic  surgery following, s/p chest tube insertion on 7/2 and removed on 7.7. latest CXR  showed no pneumothorax.  heparin drip d/minerva and switched to P.O coumadin with INR monitoring.     Hospital course complicated with worsening delirium, CT head done and showed no acute pathology.     Also found to have worsening left arm pain and edema, Vascular surgery was consulted and  Venous doppler was perforrmed, NO DVT found, no further w/u recommended as per vascular.     PT evaluated and recommended home with home PT. While PT evaluated pt his O2 saturation dropped to 84%, home O2 ordered for hypoxia due to diastolic CHF  while ambulation.     Please note that this is a brief summary of hospital course, this pt had a very extended and complicated hospital course, for complete/detailed events, please refer to daily consult and progress notes         Given patient's improved clinical status and current hemodynamic stability decision was made to discharge. Pt is stable for discharge per attending and is advised to f/u with PCP as out-patient. Please refer to Pt's complete medical chart with documents for a full hospital course, for this is only a brief summary.    updated on 7.13     ID for vanco length 69M with CAD s/p multiple stents, s/p CABG (9/2019 course c/b Afib and LT pleural effusion), Diastolic CHF, HTN, HLD, DM2, Afib on coumadin and ESRD on HD (Tu, Thurs, Sat at [facility]), hyperthyroidism, presented to ER for pain and swelling of left leg since 3 days. Admitted to medicine for cellulitis.  CXR shows gross heart enlargement and mild to moderate left base pleural pulmonary reaction again noted. Sternotomy again seen. Negative for DVT on Doppler study. Blood Cx NGTD. Pt was started on Unasyn and po Doxy, but changed to Vancomycin due to non healing cellulitis. ID Dr. Rayo consulted.  Improved leg swelling and erythema with Vanco. latest Vanco trough nav on 7,14 which is 17.7 WNL. Pt needs 5 more doses of vanco during dialysis upon discharge.     Pt. with ESRD on HD, followed by nephro Dr. Calle, HD tolerated. Continue HD as scheduled.     Pt. with significant cardiac hx including AF on Coumadin, followed by cardiology. Coumadin dose adjusted for goal INR bet. 2-3. ECHO resulted EF 40-45% (previous EF 55% in Jan. 2020).     Patient evaluated by pain management for left lower leg pain. Pain controlled w/ current pain medications.     During aedmission pt was found to have  large L loculated pleural effusion, for CT chest,  s/p thoracentesis draining 150cc of blood tinged pleural fluid. Thoracic  surgery following, s/p chest tube insertion on 7/2 and removed on 7.7. latest CXR  showed no pneumothorax.  heparin drip d/minerva and switched to P.O coumadin with INR monitoring. Pt will d/c home on coumadin 3mg followed by PCP.     Hospital course complicated with worsening delirium, CT head done and showed no acute pathology. Now back to baseline.     Also found to have worsening left arm pain and edema, Vascular surgery was consulted and  Venous doppler was perforrmed, NO DVT found, recommended to follow up with Dr. Goncalves as outpt to evaluate cental stenosis.     PT evaluated and recommended home with home PT. While PT evaluated pt his O2 saturation dropped to 84%, home O2 ordered for hypoxia due to diastolic CHF  while ambulation.     Please note that this is a brief summary of hospital course, this pt had a very extended and complicated hospital course, for complete/detailed events, please refer to daily consult and progress notes 69M with CAD s/p multiple stents, s/p CABG (9/2019 course c/b Afib and LT pleural effusion), Diastolic CHF, HTN, HLD, DM2, Afib on coumadin and ESRD on HD (Tu, Thurs, Sat at [facility]), hyperthyroidism, presented to ER for pain and swelling of left leg since 3 days. Admitted to medicine for cellulitis.  CXR shows gross heart enlargement and mild to moderate left base pleural pulmonary reaction again noted. Sternotomy again seen. Negative for DVT on Doppler study. Blood Cx NGTD. Pt was started on Unasyn and po Doxy, but changed to Vancomycin due to non healing cellulitis. ID Dr. Rayo consulted.  Improved leg swelling and erythema with Vanco. latest Vanco trough nav on 7,14 which is 17.7 WNL. Pt needs 2 more doses of vanco during dialysis upon discharge.     Pt. with ESRD on HD, followed by nephro Dr. Calle, HD tolerated. Continue HD as scheduled.     Pt. with significant cardiac hx including AF on Coumadin, followed by cardiology. Coumadin dose adjusted for goal INR bet. 2-3. ECHO resulted EF 40-45% (previous EF 55% in Jan. 2020).     Patient evaluated by pain management for left lower leg pain. Pain controlled w/ current pain medications.     During aedmission pt was found to have  large L loculated pleural effusion, for CT chest,  s/p thoracentesis draining 150cc of blood tinged pleural fluid. Thoracic  surgery following, s/p chest tube insertion on 7/2 and removed on 7.7. latest CXR  showed no pneumothorax.  heparin drip d/minerva and switched to P.O coumadin with INR monitoring. Pt will d/c home on coumadin 3mg followed by PCP.     Hospital course complicated with worsening delirium, CT head done and showed no acute pathology. Now back to baseline.     Also found to have worsening left arm pain and edema, Vascular surgery was consulted and  Venous doppler was perforrmed, NO DVT found, recommended to follow up with Dr. Goncalves as outpt to evaluate cental stenosis.     PT evaluated and recommended home with home PT. While PT evaluated pt his O2 saturation dropped to 84%, home O2 ordered for hypoxia due to diastolic CHF  while ambulation.     Please note that this is a brief summary of hospital course, this pt had a very extended and complicated hospital course, for complete/detailed events, please refer to daily consult and progress notes

## 2020-06-21 NOTE — DISCHARGE NOTE PROVIDER - PROVIDER TOKENS
FREE:[LAST:[Tiff],FIRST:[],PHONE:[phone]],FAX:[(   )    -],FOLLOWUP:[1 week]] FREE:[LAST:[Tiff],FIRST:[Lama],PHONE:[phone]],FAX:[(   )    -],FOLLOWUP:[1 week]],PROVIDER:[TOKEN:[[id number]]] PROVIDER:[TOKEN:[[id number]]],FREE:[LAST:[Tiff],FIRST:[Lama],PHONE:[phone]:[(   )    -],FOLLOWUP:[1 week]],PROVIDER:[TOKEN:[[id number]],FOLLOWUP:[1 week]]

## 2020-06-21 NOTE — PROGRESS NOTE ADULT - SUBJECTIVE AND OBJECTIVE BOX
Subjective: No chest pain or sob; still with LLE swelling; ROS otherwise negative.   	  acetaminophen   Tablet .. 650 milliGRAM(s) Oral every 6 hours PRN  ALBUTerol    90 MICROgram(s) HFA Inhaler 2 Puff(s) Inhalation every 6 hours PRN  aMIOdarone    Tablet 200 milliGRAM(s) Oral daily  ampicillin/sulbactam  IVPB 1.5 Gram(s) IV Intermittent every 12 hours  aspirin enteric coated 81 milliGRAM(s) Oral daily  calcium acetate 667 milliGRAM(s) Oral three times a day with meals  dextrose 5%. 1000 milliLiter(s) IV Continuous <Continuous>  doxycycline hyclate Capsule 100 milliGRAM(s) Oral every 12 hours  glycopyrrolate 9 MICROgram(s)/formoterol 4.8 MICROgram(s) Inhaler 2 Puff(s) Inhalation two times a day  insulin glargine Injectable (LANTUS) 10 Unit(s) SubCutaneous at bedtime  insulin lispro (HumaLOG) corrective regimen sliding scale   SubCutaneous three times a day before meals  insulin lispro (HumaLOG) corrective regimen sliding scale   SubCutaneous at bedtime  insulin lispro Injectable (HumaLOG) 3 Unit(s) SubCutaneous three times a day before meals  ketotifen 0.025% Ophthalmic Solution 1 Drop(s) Both EYES two times a day  methimazole 5 milliGRAM(s) Oral daily  metoprolol tartrate 100 milliGRAM(s) Oral two times a day  pantoprazole    Tablet 40 milliGRAM(s) Oral before breakfast  simvastatin 40 milliGRAM(s) Oral at bedtime  tiotropium 18 MICROgram(s) Capsule 1 Capsule(s) Inhalation daily  traMADol 25 milliGRAM(s) Oral every 12 hours PRN                            12.5   7.98  )-----------( 178      ( 21 Jun 2020 06:17 )             40.9       06-21    135  |  95<L>  |  42<H>  ----------------------------<  134<H>  4.5   |  29  |  6.66<H>    Ca    9.0      21 Jun 2020 06:17    TPro  7.4  /  Alb  3.0<L>  /  TBili  0.6  /  DBili  x   /  AST  14  /  ALT  27  /  AlkPhos  71  06-21            T(C): 36.7 (06-21-20 @ 07:58), Max: 36.7 (06-21-20 @ 07:58)  HR: 67 (06-21-20 @ 07:58) (67 - 85)  BP: 120/64 (06-21-20 @ 07:58) (114/64 - 125/75)  RR: 17 (06-21-20 @ 07:58) (17 - 18)  SpO2: 97% (06-21-20 @ 07:58) (93% - 100%)  Wt(kg): --    I&O's Summary    20 Jun 2020 07:01  -  21 Jun 2020 07:00  --------------------------------------------------------  IN: 400 mL / OUT: 2002 mL / NET: -1602 mL        Appearance: Normal	  HEENT:   Normal oral mucosa, PERRL, EOMI	  Lymphatic: No lymphadenopathy , + edema in LLE   Cardiovascular: Normal S1 S2, No JVD, No murmurs , Peripheral pulses palpable 2+ bilaterally  Respiratory: Lungs clear to auscultation, normal effort 	  Gastrointestinal:  Soft, Non-tender, + BS	  Skin: No rashes, No ecchymoses, No cyanosis, warm to touch  Musculoskeletal: Normal range of motion, normal strength  Psychiatry:  Mood & affect appropriate    TELEMETRY: 	    ECG: Aflutter RBBB, LAFB 	  RADIOLOGY:   DIAGNOSTIC TESTING:  [ ] Echocardiogram:  [ ]  Catheterization:  [ ] Stress Test:    OTHER: 	      ASSESSMENT/PLAN: 69y Male  CAD s/p multiple stents, s/p CABG (9/2019 course c/b Afib and LT pleural effusion), Normal LV systolic function, severe Diastolic, RV dysfx CHF, HTN, HLD, DM2, Afib on coumadin and ESRD on HD (Tu, Thurs, Sat at [facility]), hyperthyroidism, presented to ER for pain and swelling of left leg since 3 days.    - LE dopplers negative for dVT   - Continue with coumadin for goal INR 2-3 - given subtherapeutic INR, would consider hep gtt   - continue with fluid removal with HD  - can check TTE if no other causes of LE edema noted   - further workup pending above    Guruprasad Sid, MD

## 2020-06-21 NOTE — DISCHARGE NOTE PROVIDER - CARE PROVIDERS DIRECT ADDRESSES
,DirectAddress_Unknown ,DirectAddress_Unknown,DirectAddress_Unknown ,DirectAddress_Unknown,DirectAddress_Unknown,[email or web address]

## 2020-06-21 NOTE — DISCHARGE NOTE PROVIDER - NSDCFUSCHEDAPPT_GEN_ALL_CORE_FT
VIOLETTA RENTERIA ; 06/29/2020 ; NPP Med Pulm [address] [facility]  VIOLETTA RENTERIA ; 07/27/2020 ; NPP Surg Vasc [address] ; 07/27/2020 ; NPP Surg Vasc [address] VIOLETTA RENTERIA ; 06/29/2020 ; NPP Med Pulm [address] [facility]  VIOLETTA RENTERIA ; 07/27/2020 ; NPP Surg Vasc [address] ; 07/27/2020 ; NPP Surg Vasc [address] VIOLETTA RENTERIA ; 06/29/2020 ; NPP Med Pulm [address] [facility]  VIOLETTA RENTERIA ; 07/27/2020 ; NPP Surg Vasc [address] ; 07/27/2020 ; NPP Surg Vasc [address] VIOLETTA RENTERIA ; 06/29/2020 ; NPP Med Pulm [address] [facility]  VIOLETTA RENTERIA ; 07/27/2020 ; NPP Surg Vasc [address] ; 07/27/2020 ; NPP Surg Vasc [address] VIOLETTA RENTERIA ; 07/27/2020 ; NPP Surg Vasc [address] ; 07/27/2020 ; NPP Surg Vasc [address] VIOLETTA RENETRIA ; 07/27/2020 ; NPP Surg Vasc [address] ; 07/27/2020 ; NPP Surg Vasc [address] VIOLETTA RENTERIA ; 07/21/2020 ; NPP Surg Vasc [address] ; 07/27/2020 ; NPP Surg Vasc [address] ; 07/27/2020 ; P Surg Vasc [address] VIOLETTA RENTERIA ; 10/19/2020 ; NPP Surg Vasc [address] ; 10/19/2020 ; NPP Surg Vasc [address]

## 2020-06-21 NOTE — DISCHARGE NOTE PROVIDER - NSDCCAREPROVSEEN_GEN_ALL_CORE_FT
García Nicolas - PT/OT RECOMMENDS HOME WITH HOME PT/OT AND HYPOXIA TO 84 % ON ROOM AIR AT REST - WILL NEED HOME O2; CASE MANAGEMENT ARRANGING  - AMS DUE TO  METABOLIC ENCEPHALOPATHY AND VASCULAR DEMENTIA  - AVOID DELIRIOGENIC AGENTS, FREQUENT RE-ORIENTATION. CT HEAD IS DONE ON 07-, NO ACUTE CHANGES , BUT CHRONIC LACUNAR INFARCTS. D/W PATIENT'S SON AND HIS CARDIOLOGIST     -  CELLULITIS OF LEFT LEG, DIABETIC LEFT FOOT ULCER  - IMPROVED ON VANCOMYCIN [PER ID RECOMMENDATION,  BLOOD  CXS NGTD. ID DONE  BY DR. DUEÑAS    - LOCULATED LEFT SIDED PLEURAL EFFUSION S/P IR PLACED PIG TAIL CATHETER ON  07-, S/P TPA INFUSION VIA CHEST TUBE 07/07/2020 AND REMOVAL 07/08/2020 PULMONOLOGY CONSULT & THORACIC SURGERY CONSULTED. CASE DISCUSSED WITH PATIENT AND SON.   - LEFT ARM SWELLING -  ULTRASOUND NEGATIVE FOR DVT, VASCULAR SX CONSULT IN PLACE - RECOMMENDED OUTPATIENT FISTULOGRAM TO EVALUATION FOR CENTRAL STENOSIS  -  ESRD ON HD - NEPHROLOGY CONSULT IN PROGRESS  - DIASTOLIC CHF - TTE W/ EVIDENCE OF RIGHT/LEFT VENTRICULAR SYSTOLIC DYSFUNCTION, PULMONARY HTN, MR, STRESS TEST-  WITHOUT NEW PERFUSION ABNORMALITIES; CARDIOLOGY CONSULT IN PROGRESS  - A/FIB ON COUMADIN - GOAL INR 2-3  -  PAD, DIABETIC PERIPHERAL NEUROPATHY - PAIN MGMT CONSULT IN PROGRESS  - CASE DISCUSSED EXTENSIVELY WITH SON [SYLVAIN RENTERIA ] AND PATIENT. DISCUSSED RISKS/BENEFITS OF CHEST TUBE PLACEMENT WITH SON, WHO AGREED TO PROCEED WITH INTERVENTION.  -  PAD, DIABETIC PERIPHERAL NEUROPATHY    DONALD DIAZ M.D. COVERING FOR EPHRAIM DIAZ M.D.

## 2020-06-21 NOTE — DISCHARGE NOTE PROVIDER - CARE PROVIDER_API CALL
Lama Tiff  Phone: [phone]  Fax: (   )    -  Follow Up Time: 1 week Lama Tiff  Phone: [phone]  Fax: (   )    -  Follow Up Time: 1 week    Jessy Méndez  INTERVENTIONAL CARDIOLOGY  [address] [place] [address] [address]  [place] [address]  Phone: [phone]  Fax: [phone]  Follow Up Time: Jessy Méndez  INTERVENTIONAL CARDIOLOGY  [address] [facility] [address]  [place] [address]  Phone: [phone]  Fax: [phone]  Follow Up Time:     Lama Tiff  Phone: [phone]  Fax: (   )    -  Follow Up Time: 1 week    Lexx Goncalves  VASCULAR SURGERY  [address] [facility] [address]  [place] [address]  Phone: [phone]  Fax: [phone]  Follow Up Time: 1 week

## 2020-06-22 LAB
ANION GAP SERPL CALC-SCNC: 13 MMOL/L — SIGNIFICANT CHANGE UP (ref 5–17)
APTT BLD: 31.2 SEC — SIGNIFICANT CHANGE UP (ref 27.5–36.3)
BUN SERPL-MCNC: 70 MG/DL — HIGH (ref 7–18)
CALCIUM SERPL-MCNC: 9.3 MG/DL — SIGNIFICANT CHANGE UP (ref 8.4–10.5)
CHLORIDE SERPL-SCNC: 91 MMOL/L — LOW (ref 96–108)
CO2 SERPL-SCNC: 27 MMOL/L — SIGNIFICANT CHANGE UP (ref 22–31)
CREAT SERPL-MCNC: 9.06 MG/DL — HIGH (ref 0.5–1.3)
GLUCOSE BLDC GLUCOMTR-MCNC: 104 MG/DL — HIGH (ref 70–99)
GLUCOSE BLDC GLUCOMTR-MCNC: 136 MG/DL — HIGH (ref 70–99)
GLUCOSE BLDC GLUCOMTR-MCNC: 156 MG/DL — HIGH (ref 70–99)
GLUCOSE BLDC GLUCOMTR-MCNC: 226 MG/DL — HIGH (ref 70–99)
GLUCOSE SERPL-MCNC: 143 MG/DL — HIGH (ref 70–99)
HCT VFR BLD CALC: 42.4 % — SIGNIFICANT CHANGE UP (ref 39–50)
HGB BLD-MCNC: 13.1 G/DL — SIGNIFICANT CHANGE UP (ref 13–17)
INR BLD: 1.82 RATIO — HIGH (ref 0.88–1.16)
MAGNESIUM SERPL-MCNC: 2 MG/DL — SIGNIFICANT CHANGE UP (ref 1.6–2.6)
MCHC RBC-ENTMCNC: 28.8 PG — SIGNIFICANT CHANGE UP (ref 27–34)
MCHC RBC-ENTMCNC: 30.9 GM/DL — LOW (ref 32–36)
MCV RBC AUTO: 93.2 FL — SIGNIFICANT CHANGE UP (ref 80–100)
NRBC # BLD: 0 /100 WBCS — SIGNIFICANT CHANGE UP (ref 0–0)
PLATELET # BLD AUTO: 208 K/UL — SIGNIFICANT CHANGE UP (ref 150–400)
POTASSIUM SERPL-MCNC: 4.8 MMOL/L — SIGNIFICANT CHANGE UP (ref 3.5–5.3)
POTASSIUM SERPL-SCNC: 4.8 MMOL/L — SIGNIFICANT CHANGE UP (ref 3.5–5.3)
PROTHROM AB SERPL-ACNC: 20.9 SEC — HIGH (ref 10–12.9)
RBC # BLD: 4.55 M/UL — SIGNIFICANT CHANGE UP (ref 4.2–5.8)
RBC # FLD: 14.7 % — HIGH (ref 10.3–14.5)
SODIUM SERPL-SCNC: 131 MMOL/L — LOW (ref 135–145)
WBC # BLD: 7.83 K/UL — SIGNIFICANT CHANGE UP (ref 3.8–10.5)
WBC # FLD AUTO: 7.83 K/UL — SIGNIFICANT CHANGE UP (ref 3.8–10.5)

## 2020-06-22 PROCEDURE — 73590 X-RAY EXAM OF LOWER LEG: CPT | Mod: 26,LT

## 2020-06-22 RX ORDER — WARFARIN SODIUM 2.5 MG/1
6 TABLET ORAL AT BEDTIME
Refills: 0 | Status: COMPLETED | OUTPATIENT
Start: 2020-06-22 | End: 2020-06-22

## 2020-06-22 RX ADMIN — Medication 100 MILLIGRAM(S): at 17:15

## 2020-06-22 RX ADMIN — TIOTROPIUM BROMIDE 1 CAPSULE(S): 18 CAPSULE ORAL; RESPIRATORY (INHALATION) at 11:59

## 2020-06-22 RX ADMIN — Medication 100 MILLIGRAM(S): at 06:07

## 2020-06-22 RX ADMIN — Medication 81 MILLIGRAM(S): at 11:58

## 2020-06-22 RX ADMIN — Medication 667 MILLIGRAM(S): at 10:53

## 2020-06-22 RX ADMIN — AMPICILLIN SODIUM AND SULBACTAM SODIUM 100 GRAM(S): 250; 125 INJECTION, POWDER, FOR SUSPENSION INTRAMUSCULAR; INTRAVENOUS at 17:14

## 2020-06-22 RX ADMIN — Medication 650 MILLIGRAM(S): at 22:09

## 2020-06-22 RX ADMIN — KETOTIFEN FUMARATE 1 DROP(S): 0.34 SOLUTION OPHTHALMIC at 06:07

## 2020-06-22 RX ADMIN — Medication 100 MILLIGRAM(S): at 17:16

## 2020-06-22 RX ADMIN — GLYCOPYRROLATE AND FORMOTEROL FUMARATE 2 PUFF(S): 9; 4.8 AEROSOL, METERED RESPIRATORY (INHALATION) at 22:07

## 2020-06-22 RX ADMIN — CHLORHEXIDINE GLUCONATE 1 APPLICATION(S): 213 SOLUTION TOPICAL at 11:57

## 2020-06-22 RX ADMIN — Medication 667 MILLIGRAM(S): at 17:15

## 2020-06-22 RX ADMIN — Medication 667 MILLIGRAM(S): at 11:59

## 2020-06-22 RX ADMIN — Medication 650 MILLIGRAM(S): at 06:07

## 2020-06-22 RX ADMIN — GLYCOPYRROLATE AND FORMOTEROL FUMARATE 2 PUFF(S): 9; 4.8 AEROSOL, METERED RESPIRATORY (INHALATION) at 11:58

## 2020-06-22 RX ADMIN — SIMVASTATIN 40 MILLIGRAM(S): 20 TABLET, FILM COATED ORAL at 22:07

## 2020-06-22 RX ADMIN — PANTOPRAZOLE SODIUM 40 MILLIGRAM(S): 20 TABLET, DELAYED RELEASE ORAL at 06:07

## 2020-06-22 RX ADMIN — AMIODARONE HYDROCHLORIDE 200 MILLIGRAM(S): 400 TABLET ORAL at 06:07

## 2020-06-22 RX ADMIN — INSULIN GLARGINE 10 UNIT(S): 100 INJECTION, SOLUTION SUBCUTANEOUS at 22:07

## 2020-06-22 RX ADMIN — KETOTIFEN FUMARATE 1 DROP(S): 0.34 SOLUTION OPHTHALMIC at 17:15

## 2020-06-22 RX ADMIN — AMPICILLIN SODIUM AND SULBACTAM SODIUM 100 GRAM(S): 250; 125 INJECTION, POWDER, FOR SUSPENSION INTRAMUSCULAR; INTRAVENOUS at 06:07

## 2020-06-22 RX ADMIN — Medication 3 UNIT(S): at 11:55

## 2020-06-22 RX ADMIN — Medication 2: at 11:55

## 2020-06-22 NOTE — CHART NOTE - NSCHARTNOTEFT_GEN_A_CORE
EVENT:      68 y/o male with CAD s/p multiple stents, s/p CABG (9/2019 course c/b Afib and LT pleural effusion), Diastolic CHF, HTN, HLD, DM2, Afib on coumadin and ESRD on HD ([facility] Pritchett, Sat at [facility]), hyperthyroidism, presented to ER for pain and swelling of left leg since 3 days. Patient reported sustaining injury to posterior aspect of left leg 1 month ago when he accidentally hit his leg against his bed. He reported the wound had not healed since. Reported he developed pain and swelling of left leg since 3 days, 9/10 in severity, with difficulty ambulating. Also, patient reported that he was advised to get doppler which he reviewed with his cardiologist and was told he had no clots and recommended to come to ER. Denied fever, chills, weakness, cough, dysuria.   At 6/21/20, 10pm, patient son, Randal Hardin called and conserned about his father's health status.   OBJECTIVE:  Vital Signs Last 24 Hrs  T(C): 36.7 (22 Jun 2020 00:17), Max: 36.8 (21 Jun 2020 16:12)  T(F): 98 (22 Jun 2020 00:17), Max: 98.2 (21 Jun 2020 16:12)  HR: 99 (22 Jun 2020 00:17) (67 - 99)  BP: 122/67 (22 Jun 2020 00:17) (115/65 - 125/75)  BP(mean): --  RR: 22 (22 Jun 2020 00:17) (17 - 22)  SpO2: 99% (22 Jun 2020 00:17) (93% - 99%)    FOCUSED PHYSICAL EXAM:    LABS:                        12.5   7.98  )-----------( 178      ( 21 Jun 2020 06:17 )             40.9     06-21    135  |  95<L>  |  42<H>  ----------------------------<  134<H>  4.5   |  29  |  6.66<H>    Ca    9.0      21 Jun 2020 06:17    TPro  7.4  /  Alb  3.0<L>  /  TBili  0.6  /  DBili  x   /  AST  14  /  ALT  27  /  AlkPhos  71  06-21    PLAN: Spoke with patient's son Randal Hardin about current patient's health conditions and available treatment.

## 2020-06-22 NOTE — PROGRESS NOTE ADULT - PROBLEM SELECTOR PLAN 2
-on Lantus 15 units and Humalog 5 units premeal    -cont Lantus 10 units and Humalog 3 units and HSSC   -monitor BS ACHS

## 2020-06-22 NOTE — PROGRESS NOTE ADULT - ASSESSMENT
Patient is a 69y Male whom presented to the hospital with LT leg pain and swelling.  Was sent from cardiology clinic where he had gone for routine evaluation as was noted  to have RT leg swelling, to R/O DVT. ( doppler US negative for DVT).admitted for treatment of leg cellulitis   Renal consulted for ESRD    A/P  #ESRD . HD in AM UF as tolerated   # Anemia of CKD. stable off ROSARIO  # lytes and acid base at goal  on mx for bilateral leg cellulitis, Neg DVT.

## 2020-06-22 NOTE — PROGRESS NOTE ADULT - PROBLEM SELECTOR PLAN 5
-cont amiodarone and metoprolol, rate controlled   -dose Coumadin daily  -INR 1.82 today. Increased Coumadin 6mg. -cont amiodarone and metoprolol, rate controlled   -dose Coumadin daily  -INR 1.82 today. Increased Coumadin 6mg.  -Cardiology dr. Colin  -per cardiology, would consider heparin drip if INR continues subtherapeutic.

## 2020-06-22 NOTE — PROGRESS NOTE ADULT - SUBJECTIVE AND OBJECTIVE BOX
[facility] : Progress Note :: [phone], (office [phone]),   Dr Calle / Dr Esposito / Dr Macias / Dr Mendiola / Dr Narendra JULIEN / Dr Arroyo / Dr Knight / Dr Renan pickens  _____________________________________________________________________________________________  remains with LT leg pains.    lisinopril (Other)    Hospital Medications:   MEDICATIONS  (STANDING):  aMIOdarone    Tablet 200 milliGRAM(s) Oral daily  ampicillin/sulbactam  IVPB 1.5 Gram(s) IV Intermittent every 12 hours  aspirin enteric coated 81 milliGRAM(s) Oral daily  calcium acetate 667 milliGRAM(s) Oral three times a day with meals  chlorhexidine 2% Cloths 1 Application(s) Topical daily  dextrose 5%. 1000 milliLiter(s) (50 mL/Hr) IV Continuous <Continuous>  doxycycline hyclate Capsule 100 milliGRAM(s) Oral every 12 hours  glycopyrrolate 9 MICROgram(s)/formoterol 4.8 MICROgram(s) Inhaler 2 Puff(s) Inhalation two times a day  insulin glargine Injectable (LANTUS) 10 Unit(s) SubCutaneous at bedtime  insulin lispro (HumaLOG) corrective regimen sliding scale   SubCutaneous three times a day before meals  insulin lispro (HumaLOG) corrective regimen sliding scale   SubCutaneous at bedtime  insulin lispro Injectable (HumaLOG) 3 Unit(s) SubCutaneous three times a day before meals  ketotifen 0.025% Ophthalmic Solution 1 Drop(s) Both EYES two times a day  methimazole 5 milliGRAM(s) Oral daily  metoprolol tartrate 100 milliGRAM(s) Oral two times a day  pantoprazole    Tablet 40 milliGRAM(s) Oral before breakfast  simvastatin 40 milliGRAM(s) Oral at bedtime  tiotropium 18 MICROgram(s) Capsule 1 Capsule(s) Inhalation daily  warfarin 6 milliGRAM(s) Oral at bedtime        VITALS:  T(F): 97 (06-22-20 @ 16:11), Max: 98 (06-22-20 @ 00:17)  HR: 82 (06-22-20 @ 16:11)  BP: 132/75 (06-22-20 @ 16:11)  RR: 20 (06-22-20 @ 16:11)  SpO2: 97% (06-22-20 @ 16:11)  Wt(kg): --      PHYSICAL EXAM:  Constitutional: NAD  HEENT: anicteric sclera, oropharynx clear.  Neck: No JVD  Respiratory: CTAB, no wheezes, rales or rhonchi  Cardiovascular: S1, S2, RRR  Gastrointestinal: BS+, soft, NT/ND  Extremities:  No peripheral edema  Vascular Access: AVF with thrill and bruit    LABS:  06-22    131<L>  |  91<L>  |  70<H>  ----------------------------<  143<H>  4.8   |  27  |  9.06<H>    Ca    9.3      22 Jun 2020 06:12  Mg     2.0     06-22    TPro  7.4  /  Alb  3.0<L>  /  TBili  0.6  /  DBili      /  AST  14  /  ALT  27  /  AlkPhos  71  06-21    Creatinine Trend: 9.06 <--, 6.66 <--, 8.05 <--, 5.99 <--, 4.94 <--                        13.1   7.83  )-----------( 208      ( 22 Jun 2020 06:12 )             42.4     Urine Studies:      RADIOLOGY & ADDITIONAL STUDIES:

## 2020-06-22 NOTE — PROGRESS NOTE ADULT - SUBJECTIVE AND OBJECTIVE BOX
NP Note discussed with  Primary Attending    Patient is a 69y old  Male who presents with a chief complaint of leg pain (22 Jun 2020 17:25)    HPI- 69M with CAD s/p multiple stents, s/p CABG (9/2019 course c/b Afib and LT pleural effusion), Diastolic CHF, HTN, HLD, DM2, Afib on coumadin and ESRD on HD (Tu, Thurs, Sat at [facility]), hyperthyroidism, presented to ER for pain and swelling of left leg since 3 days. Admited for cellulitis, started on Unasyn and Doxy. Followed by  Nepho Dr. Calle.   Pt is afebrile and no leukocytosis but cellulitis is not improving. negative for DVT on US. s/p X ray today. no acute fx.  ID consulted for alternative abt therapy.       INTERVAL HPI/OVERNIGHT EVENTS: no new complaints    MEDICATIONS  (STANDING):  aMIOdarone    Tablet 200 milliGRAM(s) Oral daily  ampicillin/sulbactam  IVPB 1.5 Gram(s) IV Intermittent every 12 hours  aspirin enteric coated 81 milliGRAM(s) Oral daily  calcium acetate 667 milliGRAM(s) Oral three times a day with meals  chlorhexidine 2% Cloths 1 Application(s) Topical daily  dextrose 5%. 1000 milliLiter(s) (50 mL/Hr) IV Continuous <Continuous>  doxycycline hyclate Capsule 100 milliGRAM(s) Oral every 12 hours  glycopyrrolate 9 MICROgram(s)/formoterol 4.8 MICROgram(s) Inhaler 2 Puff(s) Inhalation two times a day  insulin glargine Injectable (LANTUS) 10 Unit(s) SubCutaneous at bedtime  insulin lispro (HumaLOG) corrective regimen sliding scale   SubCutaneous three times a day before meals  insulin lispro (HumaLOG) corrective regimen sliding scale   SubCutaneous at bedtime  insulin lispro Injectable (HumaLOG) 3 Unit(s) SubCutaneous three times a day before meals  ketotifen 0.025% Ophthalmic Solution 1 Drop(s) Both EYES two times a day  methimazole 5 milliGRAM(s) Oral daily  metoprolol tartrate 100 milliGRAM(s) Oral two times a day  pantoprazole    Tablet 40 milliGRAM(s) Oral before breakfast  simvastatin 40 milliGRAM(s) Oral at bedtime  tiotropium 18 MICROgram(s) Capsule 1 Capsule(s) Inhalation daily  warfarin 6 milliGRAM(s) Oral at bedtime    MEDICATIONS  (PRN):  acetaminophen   Tablet .. 650 milliGRAM(s) Oral every 6 hours PRN Mild Pain (1 - 3)  ALBUTerol    90 MICROgram(s) HFA Inhaler 2 Puff(s) Inhalation every 6 hours PRN Shortness of Breath and/or Wheezing  traMADol 25 milliGRAM(s) Oral every 12 hours PRN Moderate Pain (4 - 6)      __________________________________________________  REVIEW OF SYSTEMS:    CONSTITUTIONAL: No fever,   EYES: no acute visual disturbances  NECK: No pain or stiffness  RESPIRATORY: No cough; No shortness of breath  CARDIOVASCULAR: No chest pain, no palpitations  GASTROINTESTINAL: No pain. No nausea or vomiting; No diarrhea   NEUROLOGICAL: No headache or numbness, no tremors  MUSCULOSKELETAL: No joint pain, no muscle pain  GENITOURINARY: no dysuria, no frequency, no hesitancy  PSYCHIATRY: no depression , no anxiety  ALL OTHER  ROS negative        Vital Signs Last 24 Hrs  T(C): 36.1 (22 Jun 2020 16:11), Max: 36.7 (22 Jun 2020 00:17)  T(F): 97 (22 Jun 2020 16:11), Max: 98 (22 Jun 2020 00:17)  HR: 82 (22 Jun 2020 16:11) (78 - 99)  BP: 132/75 (22 Jun 2020 16:11) (114/64 - 134/85)  BP(mean): --  RR: 20 (22 Jun 2020 16:11) (19 - 22)  SpO2: 97% (22 Jun 2020 16:11) (95% - 99%)    ________________________________________________  PHYSICAL EXAM:  GENERAL: NAD. conversant. indifference  HEENT: Normocephalic;  conjunctivae and sclerae clear; moist mucous membranes;   NECK : supple  CHEST/LUNG: Clear to auscultation bilaterally with good air entry   HEART: S1 S2  regular; no murmurs, gallops or rubs  ABDOMEN: Soft, Nontender, Nondistended; Bowel sounds present  EXTREMITIES: no cyanosis;  no calf tenderness. Right lower leg edema/redness.   SKIN: warm and dry; no rash  NERVOUS SYSTEM:  Awake and alert; Oriented  to place, person and time ; no new deficits    _________________________________________________  LABS:                        13.1   7.83  )-----------( 208      ( 22 Jun 2020 06:12 )             42.4     06-22    131<L>  |  91<L>  |  70<H>  ----------------------------<  143<H>  4.8   |  27  |  9.06<H>    Ca    9.3      22 Jun 2020 06:12  Mg     2.0     06-22    TPro  7.4  /  Alb  3.0<L>  /  TBili  0.6  /  DBili  x   /  AST  14  /  ALT  27  /  AlkPhos  71  06-21    PT/INR - ( 22 Jun 2020 06:12 )   PT: 20.9 sec;   INR: 1.82 ratio         PTT - ( 22 Jun 2020 06:12 )  PTT:31.2 sec    CAPILLARY BLOOD GLUCOSE      POCT Blood Glucose.: 104 mg/dL (22 Jun 2020 16:37)  POCT Blood Glucose.: 226 mg/dL (22 Jun 2020 11:46)  POCT Blood Glucose.: 136 mg/dL (22 Jun 2020 07:49)  POCT Blood Glucose.: 172 mg/dL (21 Jun 2020 21:09)        RADIOLOGY & ADDITIONAL TESTS:    Imaging  Reviewed:  YES  < from: Xray Tibia + Fibula 2 Views, Left (06.22.20 @ 10:15) >    EXAM:  LEG AP&LAT - LEFT                            PROCEDURE DATE:  06/22/2020          INTERPRETATION:  CLINICAL INDICATION:  Diabetic ulceration. Evaluate for osteomyelitis.    COMPARISON:  None    TECHNIQUE:  AP and lateral radiographs of the left lower leg performed.    FINDINGS:  There is no evidence for acute or chronic fracture deformity of the left tibia or fibula. No regions of osteolysis or osteosclerosis identified. No soft tissue swelling is identified. No retained radiodense foreign body noted. Extensive vascular calcification identified. The knee and ankle articulations appear intact. Note is made of a small plantar calcaneal spur as well as spurring along the posterior aspect of the calcaneus, superiorly directed.    IMPRESSION:  As above.                  SHEFALI CANALES M.D., ATTENDING RADIOLOGIST  This document has been electronically signed. Jun 22 2020 11:24AM                < end of copied text >      < from: US Duplex Venous Lower Ext Complete, Bilateral (06.20.20 @ 11:32) >    EXAM:  US DPLX LWR EXT VEINS COMPL BI                            PROCEDURE DATE:  06/20/2020          INTERPRETATION:  CLINICAL INFORMATION: Bilateral leg swelling    COMPARISON: Bilateral lower extremity venous duplex 11/16/2012    TECHNIQUE: Duplex sonography of the BILATERAL LOWER extremity veins with color and spectral Doppler, with and without compression.      FINDINGS:    There is normal compressibility of the bilateral common femoral, femoral and popliteal veins.   Doppler examination shows normal spontaneous and phasic flow.    No calf vein thrombosis is detected.    IMPRESSION:   No evidence of deep venous thrombosis in either lower extremity.                    VANNA MAHER M.D., ATTENDING RADIOLOGIST  This document has been electronically signed. Jun 20 2020 11:37AM                < end of copied text >    Consultant(s) Notes Reviewed:   YES  ID; Nephrology    Plan of care was discussed with patient and /or primary care giver; all questions and concerns were addressed

## 2020-06-22 NOTE — PROGRESS NOTE ADULT - SUBJECTIVE AND OBJECTIVE BOX
Patient denies CP, SOB Review of systems otherwise (-)    acetaminophen   Tablet .. 650 milliGRAM(s) Oral every 6 hours PRN  ALBUTerol    90 MICROgram(s) HFA Inhaler 2 Puff(s) Inhalation every 6 hours PRN  aMIOdarone    Tablet 200 milliGRAM(s) Oral daily  ampicillin/sulbactam  IVPB 1.5 Gram(s) IV Intermittent every 12 hours  aspirin enteric coated 81 milliGRAM(s) Oral daily  calcium acetate 667 milliGRAM(s) Oral three times a day with meals  chlorhexidine 2% Cloths 1 Application(s) Topical daily  dextrose 5%. 1000 milliLiter(s) IV Continuous <Continuous>  doxycycline hyclate Capsule 100 milliGRAM(s) Oral every 12 hours  glycopyrrolate 9 MICROgram(s)/formoterol 4.8 MICROgram(s) Inhaler 2 Puff(s) Inhalation two times a day  insulin glargine Injectable (LANTUS) 10 Unit(s) SubCutaneous at bedtime  insulin lispro (HumaLOG) corrective regimen sliding scale   SubCutaneous three times a day before meals  insulin lispro (HumaLOG) corrective regimen sliding scale   SubCutaneous at bedtime  insulin lispro Injectable (HumaLOG) 3 Unit(s) SubCutaneous three times a day before meals  ketotifen 0.025% Ophthalmic Solution 1 Drop(s) Both EYES two times a day  methimazole 5 milliGRAM(s) Oral daily  metoprolol tartrate 100 milliGRAM(s) Oral two times a day  pantoprazole    Tablet 40 milliGRAM(s) Oral before breakfast  simvastatin 40 milliGRAM(s) Oral at bedtime  tiotropium 18 MICROgram(s) Capsule 1 Capsule(s) Inhalation daily  traMADol 25 milliGRAM(s) Oral every 12 hours PRN  warfarin 6 milliGRAM(s) Oral at bedtime                            13.1   7.83  )-----------( 208      ( 22 Jun 2020 06:12 )             42.4       Hemoglobin: 13.1 g/dL (06-22 @ 06:12)  Hemoglobin: 12.5 g/dL (06-21 @ 06:17)  Hemoglobin: 12.7 g/dL (06-20 @ 11:56)  Hemoglobin: 12.3 g/dL (06-19 @ 06:20)  Hemoglobin: 12.9 g/dL (06-18 @ 16:49)      06-22    131<L>  |  91<L>  |  70<H>  ----------------------------<  143<H>  4.8   |  27  |  9.06<H>    Ca    9.3      22 Jun 2020 06:12  Mg     2.0     06-22    TPro  7.4  /  Alb  3.0<L>  /  TBili  0.6  /  DBili  x   /  AST  14  /  ALT  27  /  AlkPhos  71  06-21    Creatinine Trend: 9.06<--, 6.66<--, 8.05<--, 5.99<--, 4.94<--    COAGS: PT/INR - ( 22 Jun 2020 06:12 )   PT: 20.9 sec;   INR: 1.82 ratio         PTT - ( 22 Jun 2020 06:12 )  PTT:31.2 sec          T(C): 36.7 (06-22-20 @ 08:14), Max: 36.8 (06-21-20 @ 16:12)  HR: 78 (06-22-20 @ 08:14) (73 - 99)  BP: 114/64 (06-22-20 @ 08:14) (114/64 - 134/85)  RR: 19 (06-22-20 @ 08:14) (18 - 22)  SpO2: 98% (06-22-20 @ 08:14) (95% - 99%)  Wt(kg): --    I&O's Summary    Gen: Appears well in NAD  HEENT:  (-)icterus (-)pallor  CV: N S1 S2 1/6 DEJAH (+)2 Pulses B/l  Resp:  Clear to ausculatation B/L, normal effort  GI: (+) BS Soft, NT, ND  Lymph:  (+)Edema, (-)obvious lymphadenopathy  Skin: Warm to touch, Normal turgor  Psych: Appropriate mood and affect      ASSESSMENT/PLAN: 69y Male  CAD s/p multiple stents, s/p CABG (9/2019 course c/b Afib and LT pleural effusion), Normal LV systolic function, severe Diastolic, RV dysfx CHF, HTN, HLD, DM2, Afib on coumadin and ESRD on HD (Tu, Thurs, Sat at [facility]), hyperthyroidism, presented to ER for pain and swelling of left leg since 3 days.    - LE dopplers negative for dVT   - Continue with coumadin for goal INR 2-3 - given subtherapeutic INR, would consider hep gtt   - Lower extremity X-rays noted      Murphy Colin MD, [facility]  BEEPER [phone] NP Note discussed with  Primary Attending    Patient is a 69y old  Male who presents with a chief complaint of leg pain (22 Jun 2020 11:45)    HPI- 	      INTERVAL HPI/OVERNIGHT EVENTS: no new complaints    MEDICATIONS  (STANDING):  aMIOdarone    Tablet 200 milliGRAM(s) Oral daily  ampicillin/sulbactam  IVPB 1.5 Gram(s) IV Intermittent every 12 hours  aspirin enteric coated 81 milliGRAM(s) Oral daily  calcium acetate 667 milliGRAM(s) Oral three times a day with meals  chlorhexidine 2% Cloths 1 Application(s) Topical daily  dextrose 5%. 1000 milliLiter(s) (50 mL/Hr) IV Continuous <Continuous>  doxycycline hyclate Capsule 100 milliGRAM(s) Oral every 12 hours  glycopyrrolate 9 MICROgram(s)/formoterol 4.8 MICROgram(s) Inhaler 2 Puff(s) Inhalation two times a day  insulin glargine Injectable (LANTUS) 10 Unit(s) SubCutaneous at bedtime  insulin lispro (HumaLOG) corrective regimen sliding scale   SubCutaneous three times a day before meals  insulin lispro (HumaLOG) corrective regimen sliding scale   SubCutaneous at bedtime  insulin lispro Injectable (HumaLOG) 3 Unit(s) SubCutaneous three times a day before meals  ketotifen 0.025% Ophthalmic Solution 1 Drop(s) Both EYES two times a day  methimazole 5 milliGRAM(s) Oral daily  metoprolol tartrate 100 milliGRAM(s) Oral two times a day  pantoprazole    Tablet 40 milliGRAM(s) Oral before breakfast  simvastatin 40 milliGRAM(s) Oral at bedtime  tiotropium 18 MICROgram(s) Capsule 1 Capsule(s) Inhalation daily  warfarin 6 milliGRAM(s) Oral at bedtime    MEDICATIONS  (PRN):  acetaminophen   Tablet .. 650 milliGRAM(s) Oral every 6 hours PRN Mild Pain (1 - 3)  ALBUTerol    90 MICROgram(s) HFA Inhaler 2 Puff(s) Inhalation every 6 hours PRN Shortness of Breath and/or Wheezing  traMADol 25 milliGRAM(s) Oral every 12 hours PRN Moderate Pain (4 - 6)      __________________________________________________  REVIEW OF SYSTEMS:    CONSTITUTIONAL: No fever,   EYES: no acute visual disturbances  NECK: No pain or stiffness  RESPIRATORY: No cough; No shortness of breath  CARDIOVASCULAR: No chest pain, no palpitations  GASTROINTESTINAL: No pain. No nausea or vomiting; No diarrhea   NEUROLOGICAL: No headache or numbness, no tremors  MUSCULOSKELETAL: No joint pain, no muscle pain  GENITOURINARY: no dysuria, no frequency, no hesitancy  PSYCHIATRY: no depression , no anxiety  ALL OTHER  ROS negative        Vital Signs Last 24 Hrs  T(C): 36.1 (22 Jun 2020 16:11), Max: 36.7 (22 Jun 2020 00:17)  T(F): 97 (22 Jun 2020 16:11), Max: 98 (22 Jun 2020 00:17)  HR: 82 (22 Jun 2020 16:11) (78 - 99)  BP: 132/75 (22 Jun 2020 16:11) (114/64 - 134/85)  BP(mean): --  RR: 20 (22 Jun 2020 16:11) (19 - 22)  SpO2: 97% (22 Jun 2020 16:11) (95% - 99%)    ________________________________________________  PHYSICAL EXAM:  GENERAL: NAD  HEENT: Normocephalic;  conjunctivae and sclerae clear; moist mucous membranes;   NECK : supple  CHEST/LUNG: Clear to auscultation bilaterally with good air entry   HEART: S1 S2  regular; no murmurs, gallops or rubs  ABDOMEN: Soft, Nontender, Nondistended; Bowel sounds present  EXTREMITIES: no cyanosis; no edema; no calf tenderness  SKIN: warm and dry; no rash  NERVOUS SYSTEM:  Awake and alert; Oriented  to place, person and time ; no new deficits    _________________________________________________  LABS:                        13.1   7.83  )-----------( 208      ( 22 Jun 2020 06:12 )             42.4     06-22    131<L>  |  91<L>  |  70<H>  ----------------------------<  143<H>  4.8   |  27  |  9.06<H>    Ca    9.3      22 Jun 2020 06:12  Mg     2.0     06-22    TPro  7.4  /  Alb  3.0<L>  /  TBili  0.6  /  DBili  x   /  AST  14  /  ALT  27  /  AlkPhos  71  06-21    PT/INR - ( 22 Jun 2020 06:12 )   PT: 20.9 sec;   INR: 1.82 ratio         PTT - ( 22 Jun 2020 06:12 )  PTT:31.2 sec    CAPILLARY BLOOD GLUCOSE      POCT Blood Glucose.: 104 mg/dL (22 Jun 2020 16:37)  POCT Blood Glucose.: 226 mg/dL (22 Jun 2020 11:46)  POCT Blood Glucose.: 136 mg/dL (22 Jun 2020 07:49)  POCT Blood Glucose.: 172 mg/dL (21 Jun 2020 21:09)        RADIOLOGY & ADDITIONAL TESTS:    Imaging  Reviewed:  YES    Consultant(s) Notes Reviewed:   YES      Plan of care was discussed with patient and /or primary care giver; all questions and concerns were addressed Subjective: No chest pain or sob; still with LLE swelling; ROS otherwise negative.   	  acetaminophen   Tablet .. 650 milliGRAM(s) Oral every 6 hours PRN  ALBUTerol    90 MICROgram(s) HFA Inhaler 2 Puff(s) Inhalation every 6 hours PRN  aMIOdarone    Tablet 200 milliGRAM(s) Oral daily  ampicillin/sulbactam  IVPB 1.5 Gram(s) IV Intermittent every 12 hours  aspirin enteric coated 81 milliGRAM(s) Oral daily  calcium acetate 667 milliGRAM(s) Oral three times a day with meals  dextrose 5%. 1000 milliLiter(s) IV Continuous <Continuous>  doxycycline hyclate Capsule 100 milliGRAM(s) Oral every 12 hours  glycopyrrolate 9 MICROgram(s)/formoterol 4.8 MICROgram(s) Inhaler 2 Puff(s) Inhalation two times a day  insulin glargine Injectable (LANTUS) 10 Unit(s) SubCutaneous at bedtime  insulin lispro (HumaLOG) corrective regimen sliding scale   SubCutaneous three times a day before meals  insulin lispro (HumaLOG) corrective regimen sliding scale   SubCutaneous at bedtime  insulin lispro Injectable (HumaLOG) 3 Unit(s) SubCutaneous three times a day before meals  ketotifen 0.025% Ophthalmic Solution 1 Drop(s) Both EYES two times a day  methimazole 5 milliGRAM(s) Oral daily  metoprolol tartrate 100 milliGRAM(s) Oral two times a day  pantoprazole    Tablet 40 milliGRAM(s) Oral before breakfast  simvastatin 40 milliGRAM(s) Oral at bedtime  tiotropium 18 MICROgram(s) Capsule 1 Capsule(s) Inhalation daily  traMADol 25 milliGRAM(s) Oral every 12 hours PRN                            12.5   7.98  )-----------( 178      ( 21 Jun 2020 06:17 )             40.9       06-21    135  |  95<L>  |  42<H>  ----------------------------<  134<H>  4.5   |  29  |  6.66<H>    Ca    9.0      21 Jun 2020 06:17    TPro  7.4  /  Alb  3.0<L>  /  TBili  0.6  /  DBili  x   /  AST  14  /  ALT  27  /  AlkPhos  71  06-21            T(C): 36.7 (06-21-20 @ 07:58), Max: 36.7 (06-21-20 @ 07:58)  HR: 67 (06-21-20 @ 07:58) (67 - 85)  BP: 120/64 (06-21-20 @ 07:58) (114/64 - 125/75)  RR: 17 (06-21-20 @ 07:58) (17 - 18)  SpO2: 97% (06-21-20 @ 07:58) (93% - 100%)  Wt(kg): --    I&O's Summary    20 Jun 2020 07:01  -  21 Jun 2020 07:00  --------------------------------------------------------  IN: 400 mL / OUT: 2002 mL / NET: -1602 mL        Appearance: Normal	  HEENT:   Normal oral mucosa, PERRL, EOMI	  Lymphatic: No lymphadenopathy , + edema in LLE   Cardiovascular: Normal S1 S2, No JVD, No murmurs , Peripheral pulses palpable 2+ bilaterally  Respiratory: Lungs clear to auscultation, normal effort 	  Gastrointestinal:  Soft, Non-tender, + BS	  Skin: No rashes, No ecchymoses, No cyanosis, warm to touch  Musculoskeletal: Normal range of motion, normal strength  Psychiatry:  Mood & affect appropriate    TELEMETRY: 	    ECG: Aflutter RBBB, LAFB 	  RADIOLOGY:   DIAGNOSTIC TESTING:  [ ] Echocardiogram:  [ ]  Catheterization:  [ ] Stress Test:    OTHER: 	      ASSESSMENT/PLAN: 69y Male  CAD s/p multiple stents, s/p CABG (9/2019 course c/b Afib and LT pleural effusion), Normal LV systolic function, severe Diastolic, RV dysfx CHF, HTN, HLD, DM2, Afib on coumadin and ESRD on HD (Tu, Thurs, Sat at [facility]), hyperthyroidism, presented to ER for pain and swelling of left leg since 3 days.    - LE dopplers negative for dVT   - Continue with coumadin for goal INR 2-3 - given subtherapeutic INR, would consider hep gtt   - continue with fluid removal with HD  - can check TTE if no other causes of LE edema noted   - further workup pending above

## 2020-06-22 NOTE — PROGRESS NOTE ADULT - PROBLEM SELECTOR PLAN 1
-nonhealing wound on left leg with erythema and tenderness of surrounding skin. OP doppler negative for DVT.   -repeat doppler US negative DVT on 6/20.   -s/p X ray result as above.  -s/p vanc and zosyn in ER  -blood cultures NGTD (6/18)  -c/w Unasyn and Doxy for now.  -ID dr. Rayo consulted for alternative abt tx.

## 2020-06-22 NOTE — PROGRESS NOTE ADULT - ATTENDING COMMENTS
Rudy Mendiola MD  [facility]  Office [phone]  Ans Serv [phone] cell - [phone]

## 2020-06-23 DIAGNOSIS — Z02.9 ENCOUNTER FOR ADMINISTRATIVE EXAMINATIONS, UNSPECIFIED: ICD-10-CM

## 2020-06-23 LAB
ALBUMIN SERPL ELPH-MCNC: 3.1 G/DL — LOW (ref 3.5–5)
ALP SERPL-CCNC: 69 U/L — SIGNIFICANT CHANGE UP (ref 40–120)
ALT FLD-CCNC: 25 U/L DA — SIGNIFICANT CHANGE UP (ref 10–60)
ANION GAP SERPL CALC-SCNC: 20 MMOL/L — HIGH (ref 5–17)
APTT BLD: 33.4 SEC — SIGNIFICANT CHANGE UP (ref 27.5–36.3)
AST SERPL-CCNC: 18 U/L — SIGNIFICANT CHANGE UP (ref 10–40)
BASOPHILS # BLD AUTO: 0.06 K/UL — SIGNIFICANT CHANGE UP (ref 0–0.2)
BASOPHILS NFR BLD AUTO: 0.8 % — SIGNIFICANT CHANGE UP (ref 0–2)
BILIRUB SERPL-MCNC: 0.7 MG/DL — SIGNIFICANT CHANGE UP (ref 0.2–1.2)
BUN SERPL-MCNC: 89 MG/DL — HIGH (ref 7–18)
CALCIUM SERPL-MCNC: 9.3 MG/DL — SIGNIFICANT CHANGE UP (ref 8.4–10.5)
CHLORIDE SERPL-SCNC: 93 MMOL/L — LOW (ref 96–108)
CO2 SERPL-SCNC: 19 MMOL/L — LOW (ref 22–31)
CREAT SERPL-MCNC: 10.6 MG/DL — HIGH (ref 0.5–1.3)
CULTURE RESULTS: SIGNIFICANT CHANGE UP
CULTURE RESULTS: SIGNIFICANT CHANGE UP
EOSINOPHIL # BLD AUTO: 0.17 K/UL — SIGNIFICANT CHANGE UP (ref 0–0.5)
EOSINOPHIL NFR BLD AUTO: 2.4 % — SIGNIFICANT CHANGE UP (ref 0–6)
GLUCOSE BLDC GLUCOMTR-MCNC: 125 MG/DL — HIGH (ref 70–99)
GLUCOSE BLDC GLUCOMTR-MCNC: 127 MG/DL — HIGH (ref 70–99)
GLUCOSE BLDC GLUCOMTR-MCNC: 195 MG/DL — HIGH (ref 70–99)
GLUCOSE BLDC GLUCOMTR-MCNC: 82 MG/DL — SIGNIFICANT CHANGE UP (ref 70–99)
GLUCOSE SERPL-MCNC: 94 MG/DL — SIGNIFICANT CHANGE UP (ref 70–99)
HCT VFR BLD CALC: 42.7 % — SIGNIFICANT CHANGE UP (ref 39–50)
HGB BLD-MCNC: 13.4 G/DL — SIGNIFICANT CHANGE UP (ref 13–17)
IMM GRANULOCYTES NFR BLD AUTO: 2 % — HIGH (ref 0–1.5)
INR BLD: 2.61 RATIO — HIGH (ref 0.88–1.16)
LYMPHOCYTES # BLD AUTO: 0.94 K/UL — LOW (ref 1–3.3)
LYMPHOCYTES # BLD AUTO: 13.3 % — SIGNIFICANT CHANGE UP (ref 13–44)
MCHC RBC-ENTMCNC: 29 PG — SIGNIFICANT CHANGE UP (ref 27–34)
MCHC RBC-ENTMCNC: 31.4 GM/DL — LOW (ref 32–36)
MCV RBC AUTO: 92.4 FL — SIGNIFICANT CHANGE UP (ref 80–100)
MONOCYTES # BLD AUTO: 0.66 K/UL — SIGNIFICANT CHANGE UP (ref 0–0.9)
MONOCYTES NFR BLD AUTO: 9.3 % — SIGNIFICANT CHANGE UP (ref 2–14)
NEUTROPHILS # BLD AUTO: 5.12 K/UL — SIGNIFICANT CHANGE UP (ref 1.8–7.4)
NEUTROPHILS NFR BLD AUTO: 72.2 % — SIGNIFICANT CHANGE UP (ref 43–77)
NRBC # BLD: 0 /100 WBCS — SIGNIFICANT CHANGE UP (ref 0–0)
PLATELET # BLD AUTO: 209 K/UL — SIGNIFICANT CHANGE UP (ref 150–400)
POTASSIUM SERPL-MCNC: 4.8 MMOL/L — SIGNIFICANT CHANGE UP (ref 3.5–5.3)
POTASSIUM SERPL-SCNC: 4.8 MMOL/L — SIGNIFICANT CHANGE UP (ref 3.5–5.3)
PROT SERPL-MCNC: 7.4 G/DL — SIGNIFICANT CHANGE UP (ref 6–8.3)
PROTHROM AB SERPL-ACNC: 30.4 SEC — HIGH (ref 10–12.9)
RBC # BLD: 4.62 M/UL — SIGNIFICANT CHANGE UP (ref 4.2–5.8)
RBC # FLD: 14.8 % — HIGH (ref 10.3–14.5)
SODIUM SERPL-SCNC: 132 MMOL/L — LOW (ref 135–145)
SPECIMEN SOURCE: SIGNIFICANT CHANGE UP
SPECIMEN SOURCE: SIGNIFICANT CHANGE UP
WBC # BLD: 7.09 K/UL — SIGNIFICANT CHANGE UP (ref 3.8–10.5)
WBC # FLD AUTO: 7.09 K/UL — SIGNIFICANT CHANGE UP (ref 3.8–10.5)

## 2020-06-23 RX ORDER — VANCOMYCIN HCL 1 G
VIAL (EA) INTRAVENOUS
Refills: 0 | Status: DISCONTINUED | OUTPATIENT
Start: 2020-06-23 | End: 2020-06-23

## 2020-06-23 RX ORDER — VANCOMYCIN HCL 1 G
1000 VIAL (EA) INTRAVENOUS
Refills: 0 | Status: COMPLETED | OUTPATIENT
Start: 2020-06-25 | End: 2020-06-30

## 2020-06-23 RX ORDER — WARFARIN SODIUM 2.5 MG/1
6 TABLET ORAL AT BEDTIME
Refills: 0 | Status: COMPLETED | OUTPATIENT
Start: 2020-06-23 | End: 2020-06-23

## 2020-06-23 RX ORDER — VANCOMYCIN HCL 1 G
1000 VIAL (EA) INTRAVENOUS ONCE
Refills: 0 | Status: COMPLETED | OUTPATIENT
Start: 2020-06-23 | End: 2020-06-23

## 2020-06-23 RX ADMIN — AMIODARONE HYDROCHLORIDE 200 MILLIGRAM(S): 400 TABLET ORAL at 05:23

## 2020-06-23 RX ADMIN — Medication 81 MILLIGRAM(S): at 14:17

## 2020-06-23 RX ADMIN — TIOTROPIUM BROMIDE 1 CAPSULE(S): 18 CAPSULE ORAL; RESPIRATORY (INHALATION) at 14:15

## 2020-06-23 RX ADMIN — WARFARIN SODIUM 6 MILLIGRAM(S): 2.5 TABLET ORAL at 21:48

## 2020-06-23 RX ADMIN — GLYCOPYRROLATE AND FORMOTEROL FUMARATE 2 PUFF(S): 9; 4.8 AEROSOL, METERED RESPIRATORY (INHALATION) at 21:48

## 2020-06-23 RX ADMIN — AMPICILLIN SODIUM AND SULBACTAM SODIUM 100 GRAM(S): 250; 125 INJECTION, POWDER, FOR SUSPENSION INTRAMUSCULAR; INTRAVENOUS at 05:23

## 2020-06-23 RX ADMIN — Medication 3 UNIT(S): at 14:38

## 2020-06-23 RX ADMIN — Medication 667 MILLIGRAM(S): at 14:13

## 2020-06-23 RX ADMIN — WARFARIN SODIUM 6 MILLIGRAM(S): 2.5 TABLET ORAL at 00:14

## 2020-06-23 RX ADMIN — SIMVASTATIN 40 MILLIGRAM(S): 20 TABLET, FILM COATED ORAL at 21:48

## 2020-06-23 RX ADMIN — PANTOPRAZOLE SODIUM 40 MILLIGRAM(S): 20 TABLET, DELAYED RELEASE ORAL at 05:23

## 2020-06-23 RX ADMIN — Medication 100 MILLIGRAM(S): at 05:23

## 2020-06-23 RX ADMIN — INSULIN GLARGINE 10 UNIT(S): 100 INJECTION, SOLUTION SUBCUTANEOUS at 21:48

## 2020-06-23 RX ADMIN — Medication 667 MILLIGRAM(S): at 17:39

## 2020-06-23 RX ADMIN — KETOTIFEN FUMARATE 1 DROP(S): 0.34 SOLUTION OPHTHALMIC at 18:00

## 2020-06-23 RX ADMIN — KETOTIFEN FUMARATE 1 DROP(S): 0.34 SOLUTION OPHTHALMIC at 05:24

## 2020-06-23 RX ADMIN — Medication 250 MILLIGRAM(S): at 14:37

## 2020-06-23 RX ADMIN — Medication 100 MILLIGRAM(S): at 17:38

## 2020-06-23 NOTE — PROGRESS NOTE ADULT - ATTENDING COMMENTS
Rudy Mendiola MD  [facility]  Office [phone]  Ans Serv [phone] cell - [phone]

## 2020-06-23 NOTE — PROGRESS NOTE ADULT - SUBJECTIVE AND OBJECTIVE BOX
Patient denies CP, SOB, sats 88% on Room air at HD Review of systems otherwise (-)    acetaminophen   Tablet .. 650 milliGRAM(s) Oral every 6 hours PRN  ALBUTerol    90 MICROgram(s) HFA Inhaler 2 Puff(s) Inhalation every 6 hours PRN  aMIOdarone    Tablet 200 milliGRAM(s) Oral daily  aspirin enteric coated 81 milliGRAM(s) Oral daily  calcium acetate 667 milliGRAM(s) Oral three times a day with meals  chlorhexidine 2% Cloths 1 Application(s) Topical daily  dextrose 5%. 1000 milliLiter(s) IV Continuous <Continuous>  glycopyrrolate 9 MICROgram(s)/formoterol 4.8 MICROgram(s) Inhaler 2 Puff(s) Inhalation two times a day  insulin glargine Injectable (LANTUS) 10 Unit(s) SubCutaneous at bedtime  insulin lispro (HumaLOG) corrective regimen sliding scale   SubCutaneous three times a day before meals  insulin lispro (HumaLOG) corrective regimen sliding scale   SubCutaneous at bedtime  insulin lispro Injectable (HumaLOG) 3 Unit(s) SubCutaneous three times a day before meals  ketotifen 0.025% Ophthalmic Solution 1 Drop(s) Both EYES two times a day  methimazole 5 milliGRAM(s) Oral daily  metoprolol tartrate 100 milliGRAM(s) Oral two times a day  pantoprazole    Tablet 40 milliGRAM(s) Oral before breakfast  simvastatin 40 milliGRAM(s) Oral at bedtime  tiotropium 18 MICROgram(s) Capsule 1 Capsule(s) Inhalation daily  traMADol 25 milliGRAM(s) Oral every 12 hours PRN  warfarin 6 milliGRAM(s) Oral at bedtime                            13.4   7.09  )-----------( 209      ( 23 Jun 2020 07:31 )             42.7       Hemoglobin: 13.4 g/dL (06-23 @ 07:31)  Hemoglobin: 13.1 g/dL (06-22 @ 06:12)  Hemoglobin: 12.5 g/dL (06-21 @ 06:17)  Hemoglobin: 12.7 g/dL (06-20 @ 11:56)  Hemoglobin: 12.3 g/dL (06-19 @ 06:20)      06-23    132<L>  |  93<L>  |  89<H>  ----------------------------<  94  4.8   |  19<L>  |  10.60<H>    Ca    9.3      23 Jun 2020 07:31  Mg     2.0     06-22    TPro  7.4  /  Alb  3.1<L>  /  TBili  0.7  /  DBili  x   /  AST  18  /  ALT  25  /  AlkPhos  69  06-23    Creatinine Trend: 10.60<--, 9.06<--, 6.66<--, 8.05<--, 5.99<--, 4.94<--    COAGS: PT/INR - ( 23 Jun 2020 07:31 )   PT: 30.4 sec;   INR: 2.61 ratio         PTT - ( 23 Jun 2020 07:31 )  PTT:33.4 sec          T(C): 36.3 (06-23-20 @ 15:47), Max: 36.8 (06-23-20 @ 10:20)  HR: 72 (06-23-20 @ 15:47) (72 - 85)  BP: 134/64 (06-23-20 @ 15:47) (132/77 - 146/92)  RR: 18 (06-23-20 @ 15:47) (17 - 22)  SpO2: 95% (06-23-20 @ 15:47) (95% - 98%)  Wt(kg): --    I&O's Summary    Gen: Appears well in NAD  HEENT:  (-)icterus (-)pallor  CV: N S1 S2 1/6 DEJAH (+)2 Pulses B/l  Resp:  Clear to ausculatation B/L, normal effort  GI: (+) BS Soft, NT, ND  Lymph:  (+)Edema, (-)obvious lymphadenopathy  Skin: Warm to touch, Normal turgor  Psych: Appropriate mood and affect      ASSESSMENT/PLAN: 69y Male  CAD s/p multiple stents, s/p CABG (9/2019 course c/b Afib and LT pleural effusion), Normal LV systolic function, severe Diastolic, RV dysfx CHF, HTN, HLD, DM2, Afib on coumadin and ESRD on HD (Tu, Thurs, Sat at [facility]), hyperthyroidism, presented to ER for pain and swelling of left leg since 3 days.    - LE dopplers negative for dVT   - Continue with coumadin for goal INR 2-3 -   - continue with fluid removal with HD  - HD per renal  - f/u echo    Murphy Colin MD, [facility]  BEEPER [phone]

## 2020-06-23 NOTE — PROGRESS NOTE ADULT - SUBJECTIVE AND OBJECTIVE BOX
NP Note discussed with  Primary Attending    Patient is a 69y old  Male who presents with a chief complaint of leg pain (22 Jun 2020 17:25)    HPI- 69M with CAD s/p multiple stents, s/p CABG (9/2019 course c/b Afib and LT pleural effusion), Diastolic CHF, HTN, HLD, DM2, Afib on coumadin and ESRD on HD (Tu, Thurs, Sat at [facility]), hyperthyroidism, presented to ER for pain and swelling of left leg since 3 days. Admited for cellulitis, started on Unasyn and Doxy. Followed by  Nepho Dr. Calle.   Pt is afebrile and no leukocytosis but cellulitis is not improving. negative for DVT on US. s/p X ray shows no acute fx and no soft tissue swelling.  ID consulted for alternative abt therapy. Abt changed to Vancomycin per dr. Rayo.   Scheduled for HD today. Echo to be done as per cardiology.     INTERVAL HPI/OVERNIGHT EVENTS:  seen at bedside. c/o mild - moderate pain to left lower leg over night. no complaints of pain this morning.     MEDICATIONS  (STANDING):  aMIOdarone    Tablet 200 milliGRAM(s) Oral daily  aspirin enteric coated 81 milliGRAM(s) Oral daily  calcium acetate 667 milliGRAM(s) Oral three times a day with meals  chlorhexidine 2% Cloths 1 Application(s) Topical daily  dextrose 5%. 1000 milliLiter(s) (50 mL/Hr) IV Continuous <Continuous>  glycopyrrolate 9 MICROgram(s)/formoterol 4.8 MICROgram(s) Inhaler 2 Puff(s) Inhalation two times a day  insulin glargine Injectable (LANTUS) 10 Unit(s) SubCutaneous at bedtime  insulin lispro (HumaLOG) corrective regimen sliding scale   SubCutaneous three times a day before meals  insulin lispro (HumaLOG) corrective regimen sliding scale   SubCutaneous at bedtime  insulin lispro Injectable (HumaLOG) 3 Unit(s) SubCutaneous three times a day before meals  ketotifen 0.025% Ophthalmic Solution 1 Drop(s) Both EYES two times a day  methimazole 5 milliGRAM(s) Oral daily  metoprolol tartrate 100 milliGRAM(s) Oral two times a day  pantoprazole    Tablet 40 milliGRAM(s) Oral before breakfast  simvastatin 40 milliGRAM(s) Oral at bedtime  tiotropium 18 MICROgram(s) Capsule 1 Capsule(s) Inhalation daily  vancomycin  IVPB        MEDICATIONS  (PRN):  acetaminophen   Tablet .. 650 milliGRAM(s) Oral every 6 hours PRN Mild Pain (1 - 3)  ALBUTerol    90 MICROgram(s) HFA Inhaler 2 Puff(s) Inhalation every 6 hours PRN Shortness of Breath and/or Wheezing  traMADol 25 milliGRAM(s) Oral every 12 hours PRN Moderate Pain (4 - 6)      __________________________________________________  REVIEW OF SYSTEMS:    CONSTITUTIONAL: No fever,   EYES: no acute visual disturbances  NECK: No pain or stiffness  RESPIRATORY: No cough; No shortness of breath  CARDIOVASCULAR: No chest pain, no palpitations  GASTROINTESTINAL: No pain. No nausea or vomiting; No diarrhea   NEUROLOGICAL: No headache or numbness, no tremors  MUSCULOSKELETAL: No joint pain, no muscle pain  GENITOURINARY: no dysuria, no frequency, no hesitancy  PSYCHIATRY: no depression , no anxiety  ALL OTHER  ROS negative        Vital Signs Last 24 Hrs  T(C): 36.6 (23 Jun 2020 12:50), Max: 36.8 (23 Jun 2020 10:20)  T(F): 97.8 (23 Jun 2020 12:50), Max: 98.3 (23 Jun 2020 10:20)  HR: 85 (23 Jun 2020 12:50) (74 - 85)  BP: 132/77 (23 Jun 2020 12:50) (132/75 - 146/92)  BP(mean): --  RR: 20 (23 Jun 2020 12:50) (17 - 22)  SpO2: 96% (23 Jun 2020 12:50) (96% - 98%)    ________________________________________________  PHYSICAL EXAM:  GENERAL: NAD. conversant.   HEENT: Normocephalic;  conjunctivae and sclerae clear; moist mucous membranes;   NECK : supple  CHEST/LUNG: Clear to auscultation bilaterally with good air entry   HEART: S1 S2  regular; no murmurs, gallops or rubs  ABDOMEN: Soft, Nontender, Nondistended; Bowel sounds present  EXTREMITIES: no cyanosis; no calf tenderness no pitting edema b/l.   SKIN: warm and dry. left leg swelling w/ erythema-increased. tenderness to touch.   NERVOUS SYSTEM:  Awake and alert; Oriented  to place, person and time ; no new deficits    _________________________________________________  LABS:                        13.4   7.09  )-----------( 209      ( 23 Jun 2020 07:31 )             42.7     06-23    132<L>  |  93<L>  |  89<H>  ----------------------------<  94  4.8   |  19<L>  |  10.60<H>    Ca    9.3      23 Jun 2020 07:31  Mg     2.0     06-22    TPro  7.4  /  Alb  3.1<L>  /  TBili  0.7  /  DBili  x   /  AST  18  /  ALT  25  /  AlkPhos  69  06-23    PT/INR - ( 23 Jun 2020 07:31 )   PT: 30.4 sec;   INR: 2.61 ratio         PTT - ( 23 Jun 2020 07:31 )  PTT:33.4 sec    CAPILLARY BLOOD GLUCOSE      POCT Blood Glucose.: 127 mg/dL (23 Jun 2020 12:18)  POCT Blood Glucose.: 82 mg/dL (23 Jun 2020 07:49)  POCT Blood Glucose.: 156 mg/dL (22 Jun 2020 21:03)  POCT Blood Glucose.: 104 mg/dL (22 Jun 2020 16:37)        RADIOLOGY & ADDITIONAL TESTS:    Imaging  Reviewed:  YES  < from: Xray Tibia + Fibula 2 Views, Left (06.22.20 @ 10:15) >    EXAM:  LEG AP&LAT - LEFT                            PROCEDURE DATE:  06/22/2020          INTERPRETATION:  CLINICAL INDICATION:  Diabetic ulceration. Evaluate for osteomyelitis.    COMPARISON:  None    TECHNIQUE:  AP and lateral radiographs of the left lower leg performed.    FINDINGS:  There is no evidence for acute or chronic fracture deformity of the left tibia or fibula. No regions of osteolysis or osteosclerosis identified. No soft tissue swelling is identified. No retained radiodense foreign body noted. Extensive vascular calcification identified. The knee and ankle articulations appear intact. Note is made of a small plantar calcaneal spur as well as spurring along the posterior aspect of the calcaneus, superiorly directed.    IMPRESSION:  As above.                  SHEFALI CANALES M.D., ATTENDING RADIOLOGIST  This document has been electronically signed. Jun 22 2020 11:24AM                < end of copied text >    < from: US Duplex Venous Lower Ext Complete, Bilateral (06.20.20 @ 11:32) >    EXAM:  US DPLX LWR EXT VEINS COMPL BI                            PROCEDURE DATE:  06/20/2020          INTERPRETATION:  CLINICAL INFORMATION: Bilateral leg swelling    COMPARISON: Bilateral lower extremity venous duplex 11/16/2012    TECHNIQUE: Duplex sonography of the BILATERAL LOWER extremity veins with color and spectral Doppler, with and without compression.      FINDINGS:    There is normal compressibility of the bilateral common femoral, femoral and popliteal veins.   Doppler examination shows normal spontaneous and phasic flow.    No calf vein thrombosis is detected.    IMPRESSION:   No evidence of deep venous thrombosis in either lower extremity.                    VANNA MAHER M.D., ATTENDING RADIOLOGIST  This document has been electronically signed. Jun 20 2020 11:37AM                < end of copied text >    < from: Xray Chest 1 View-PORTABLE IMMEDIATE (06.18.20 @ 17:48) >    EXAM:  XR CHEST PORTABLE IMMED 1V                            PROCEDURE DATE:  06/18/2020          INTERPRETATION:  AP semierect chest on June 18, 2020 at 5:52 PM. Patient has leg cellulitis.    Gross heart enlargement and mild to moderate left basepleural pulmonary reaction again noted. Sternotomy again seen.    IMPRESSION: Unchanged chest as above.                CAITLIN VALDEZ M.D., ATTENDING RADIOLOGIST  This document has been electronically signed. Jun 18 2020  5:50PM                < end of copied text >      Consultant(s) Notes Reviewed:   YES  ID. nephrology. cardiology.     Plan of care was discussed with patient and /or primary care giver; all questions and concerns were addressed

## 2020-06-23 NOTE — CONSULT NOTE ADULT - RS GEN PE MLT RESP DETAILS PC
no rhonchi/clear to auscultation bilaterally/breath sounds equal/good air movement/no rales/no wheezes

## 2020-06-23 NOTE — CONSULT NOTE ADULT - SKIN COMMENTS
left leg warmth ++ especially compared to right leg, swelling ++, darkening of skin color, tenderness ++

## 2020-06-23 NOTE — PROGRESS NOTE ADULT - ATTENDING COMMENTS
PATIENT IS SEEN AND EXAMINED    -  CELLULITIS OF LEFT LEG, DIABETIC LEFT FOOT ULCER  ON IV. UNASYN, AND PO DOXYCYCLINE - WILL SWITCH TO VANCOMYCIN [PER ID RECOMMENDATION]. BLOOD  CXS NGTD. ID F/UP BY DR. DUEÑAS TO F/ UP   -  ESRD ON HD - NEPHROLOGY CONSULT IN PROGRESS  -  PAD, DIABETIC PERIPHERAL NEUROPATHY  -  GI AND DVT PROPHYLAXIS    DONALD DIAZ M.D. COVERING EPHRAIM DIAZ M.D.

## 2020-06-23 NOTE — CONSULT NOTE ADULT - SUBJECTIVE AND OBJECTIVE BOX
HPI:  69M with CAD s/p multiple stents, s/p CABG (9/2019 course c/b Afib and LT pleural effusion), Diastolic CHF, HTN, HLD, DM2, Afib on coumadin and ESRD on HD (Tu, Thurs, Sat at [facility]), hyperthyroidism, presented to ER for pain and swelling of left leg since 3 days. Patient reports sustaining injury to posterior aspect of left leg 1 month ago when he accidentally hit his leg against his bed. He reports the wound has not healed since. Reports he developed pain and swelling of left leg since 3 days, 9/10 in severity, with difficulty ambulating. Reports he was advised to get doppler which he reviewed with [facility] cardiologist today, was told he has no clot and recommended to come to ER. Denies fever, chills, weakness, cough, dysuria, NVDC. (18 Jun 2020 19:29)      PAST MEDICAL & SURGICAL HISTORY:  ESRD (end stage renal disease) on dialysis: T-Th-Sat  Stented coronary artery: total 15 stents from 2932-3249  Hyperthyroidism  H/O: CVA: after cardiac stent placed 12/15/09 -no residual  Heart Attack: 2/1/07 with subsequent stent  Coronary Stent: 0847-5250 10 stents,  to Ramus 1/2015, cath 01/2016 ( see results in HPI)  Hypercholesterolemia  CAD (Coronary Artery Disease)  DM (Diabetes Mellitus): x 4 yrs without N/N/R  HTN (Hypertension)  S/P CABG x 4  Hemodialysis access, AV graft: 5/2015, L arm  S/P cataract extraction  Boil of Buttock: 2006 and 2008      lisinopril (Other)      Meds:  acetaminophen   Tablet .. 650 milliGRAM(s) Oral every 6 hours PRN  ALBUTerol    90 MICROgram(s) HFA Inhaler 2 Puff(s) Inhalation every 6 hours PRN  aMIOdarone    Tablet 200 milliGRAM(s) Oral daily  aspirin enteric coated 81 milliGRAM(s) Oral daily  calcium acetate 667 milliGRAM(s) Oral three times a day with meals  chlorhexidine 2% Cloths 1 Application(s) Topical daily  dextrose 5%. 1000 milliLiter(s) IV Continuous <Continuous>  glycopyrrolate 9 MICROgram(s)/formoterol 4.8 MICROgram(s) Inhaler 2 Puff(s) Inhalation two times a day  insulin glargine Injectable (LANTUS) 10 Unit(s) SubCutaneous at bedtime  insulin lispro (HumaLOG) corrective regimen sliding scale   SubCutaneous three times a day before meals  insulin lispro (HumaLOG) corrective regimen sliding scale   SubCutaneous at bedtime  insulin lispro Injectable (HumaLOG) 3 Unit(s) SubCutaneous three times a day before meals  ketotifen 0.025% Ophthalmic Solution 1 Drop(s) Both EYES two times a day  methimazole 5 milliGRAM(s) Oral daily  metoprolol tartrate 100 milliGRAM(s) Oral two times a day  pantoprazole    Tablet 40 milliGRAM(s) Oral before breakfast  simvastatin 40 milliGRAM(s) Oral at bedtime  tiotropium 18 MICROgram(s) Capsule 1 Capsule(s) Inhalation daily  traMADol 25 milliGRAM(s) Oral every 12 hours PRN  vancomycin  IVPB 1000 milliGRAM(s) IV Intermittent once  vancomycin  IVPB          SOCIAL HISTORY:  Smoker:  YES / NO        PACK YEARS:                         WHEN QUIT?  ETOH use:  YES / NO               FREQUENCY / QUANTITY:  Ilicit Drug use:  YES / NO  Occupation:  Assisted device use (Cane / Walker):  Live with:    FAMILY HISTORY:  Family history of coronary artery disease  Family history of diabetes mellitus (Sibling)      VITALS:  Vital Signs Last 24 Hrs  T(C): 36.6 (23 Jun 2020 12:50), Max: 36.8 (23 Jun 2020 10:20)  T(F): 97.8 (23 Jun 2020 12:50), Max: 98.3 (23 Jun 2020 10:20)  HR: 85 (23 Jun 2020 12:50) (74 - 85)  BP: 132/77 (23 Jun 2020 12:50) (132/75 - 146/92)  BP(mean): --  RR: 20 (23 Jun 2020 12:50) (17 - 22)  SpO2: 96% (23 Jun 2020 12:50) (96% - 98%)    LABS/DIAGNOSTIC TESTS:                          13.4   7.09  )-----------( 209      ( 23 Jun 2020 07:31 )             42.7     WBC Count: 7.09 K/uL (06-23 @ 07:31)  WBC Count: 7.83 K/uL (06-22 @ 06:12)  WBC Count: 7.98 K/uL (06-21 @ 06:17)      06-23    132<L>  |  93<L>  |  89<H>  ----------------------------<  94  4.8   |  19<L>  |  10.60<H>    Ca    9.3      23 Jun 2020 07:31  Mg     2.0     06-22    TPro  7.4  /  Alb  3.1<L>  /  TBili  0.7  /  DBili  x   /  AST  18  /  ALT  25  /  AlkPhos  69  06-23          LIVER FUNCTIONS - ( 23 Jun 2020 07:31 )  Alb: 3.1 g/dL / Pro: 7.4 g/dL / ALK PHOS: 69 U/L / ALT: 25 U/L DA / AST: 18 U/L / GGT: x             PT/INR - ( 23 Jun 2020 07:31 )   PT: 30.4 sec;   INR: 2.61 ratio         PTT - ( 23 Jun 2020 07:31 )  PTT:33.4 sec    LACTATE:    ABG -     CULTURES:   .Blood Blood  06-18 @ 22:15   No growth to date.  --  --          RADIOLOGY:< from: Xray Tibia + Fibula 2 Views, Left (06.22.20 @ 10:15) >  EXAM:  LEG AP&LAT - LEFT                            PROCEDURE DATE:  06/22/2020          INTERPRETATION:  CLINICAL INDICATION:  Diabetic ulceration. Evaluate for osteomyelitis.    COMPARISON:  None    TECHNIQUE:  AP and lateral radiographs of the left lower leg performed.    FINDINGS:  There is no evidence for acute or chronic fracture deformity of the left tibia or fibula. No regions of osteolysis or osteosclerosis identified. No soft tissue swelling is identified. No retained radiodense foreign body noted. Extensive vascular calcification identified. The knee and ankle articulations appear intact. Note is made of a small plantar calcaneal spur as well as spurring along the posterior aspect of the calcaneus, superiorly directed.    IMPRESSION:  As above.        SHEFALI CANALES M.D., ATTENDING RADIOLOGIST  This document has been electronically signed. Jun 22 2020 11:24AM      -------------------------------------------------------------------------------------------------------------------------------------------------------------------  < from: US Duplex Venous Lower Ext Complete, Bilateral (06.20.20 @ 11:32) >  EXAM:  US DPLX LWR EXT VEINS COMPL BI                            PROCEDURE DATE:  06/20/2020          INTERPRETATION:  CLINICAL INFORMATION: Bilateral leg swelling    COMPARISON: Bilateral lower extremity venous duplex 11/16/2012    TECHNIQUE: Duplex sonography of the BILATERAL LOWER extremity veins with color and spectral Doppler, with and without compression.      FINDINGS:    There is normal compressibility of the bilateral common femoral, femoral and popliteal veins.   Doppler examination shows normal spontaneous and phasic flow.    No calf vein thrombosis is detected.    IMPRESSION:   No evidence of deep venous thrombosis in either lower extremity.          VANNA MAHER M.D., ATTENDING RADIOLOGIST  This document has been electronically signed. Jun 20 2020 11:37AM      ------------------------------------------------------------------------------------------------------------------------------------------------------------    < from: Xray Chest 1 View-PORTABLE IMMEDIATE (06.18.20 @ 17:48) >  EXAM:  XR CHEST PORTABLE IMMED 1V                            PROCEDURE DATE:  06/18/2020          INTERPRETATION:  AP semierect chest on June 18, 2020 at 5:52 PM. Patient has leg cellulitis.    Gross heart enlargement and mild to moderate left basepleural pulmonary reaction again noted. Sternotomy again seen.    IMPRESSION: Unchanged chest as above.        CAITLIN VALDEZ M.D., ATTENDING RADIOLOGIST  This document has been electronically signed. Jun 18 2020  5:50PM        < end of copied text >          ROS  [  ] UNABLE TO ELICIT HPI:  69M with CAD s/p multiple stents, s/p CABG (9/2019 course c/b Afib and LT pleural effusion), Diastolic CHF, HTN, HLD, DM2, Afib on coumadin and ESRD on HD (Tu, Thurs, Sat at [facility]), hyperthyroidism, presented to ER for pain and swelling of left leg since 3 days. Patient reports sustaining injury to posterior aspect of left leg 1 month ago when he accidentally hit his leg against his bed. He reports the wound has not healed since. Reports he developed pain and swelling of left leg since 3 days, 9/10 in severity, with difficulty ambulating. Reports he was advised to get doppler which he reviewed with [facility] cardiologist today, was told he has no clot and recommended to come to ER. Denies fever, chills, weakness, cough, dysuria, NVDC. (18 Jun 2020 19:29)      History as above and below, pt presents to the hospital with left leg pain and swelling for approx 1 week , he had injured his leg a few weeks earlier and started after that. He was getting antibiotics here but was not improving hence asked to eval him. He has no fevers or chills, no leukocytosis.        PAST MEDICAL & SURGICAL HISTORY:  ESRD (end stage renal disease) on dialysis: T-Th-Sat  Stented coronary artery: total 15 stents from 9256-9665  Hyperthyroidism  H/O: CVA: after cardiac stent placed 12/15/09 -no residual  Heart Attack: 2/1/07 with subsequent stent  Coronary Stent: 3011-7555 10 stents,  to Ramus 1/2015, cath 01/2016 ( see results in HPI)  Hypercholesterolemia  CAD (Coronary Artery Disease)  DM (Diabetes Mellitus): x 4 yrs without N/N/R  HTN (Hypertension)  S/P CABG x 4  Hemodialysis access, AV graft: 5/2015, L arm  S/P cataract extraction  Boil of Buttock: 2006 and 2008      lisinopril (Other)      Meds:  acetaminophen   Tablet .. 650 milliGRAM(s) Oral every 6 hours PRN  ALBUTerol    90 MICROgram(s) HFA Inhaler 2 Puff(s) Inhalation every 6 hours PRN  aMIOdarone    Tablet 200 milliGRAM(s) Oral daily  aspirin enteric coated 81 milliGRAM(s) Oral daily  calcium acetate 667 milliGRAM(s) Oral three times a day with meals  chlorhexidine 2% Cloths 1 Application(s) Topical daily  dextrose 5%. 1000 milliLiter(s) IV Continuous <Continuous>  glycopyrrolate 9 MICROgram(s)/formoterol 4.8 MICROgram(s) Inhaler 2 Puff(s) Inhalation two times a day  insulin glargine Injectable (LANTUS) 10 Unit(s) SubCutaneous at bedtime  insulin lispro (HumaLOG) corrective regimen sliding scale   SubCutaneous three times a day before meals  insulin lispro (HumaLOG) corrective regimen sliding scale   SubCutaneous at bedtime  insulin lispro Injectable (HumaLOG) 3 Unit(s) SubCutaneous three times a day before meals  ketotifen 0.025% Ophthalmic Solution 1 Drop(s) Both EYES two times a day  methimazole 5 milliGRAM(s) Oral daily  metoprolol tartrate 100 milliGRAM(s) Oral two times a day  pantoprazole    Tablet 40 milliGRAM(s) Oral before breakfast  simvastatin 40 milliGRAM(s) Oral at bedtime  tiotropium 18 MICROgram(s) Capsule 1 Capsule(s) Inhalation daily  traMADol 25 milliGRAM(s) Oral every 12 hours PRN  vancomycin  IVPB 1000 milliGRAM(s) IV Intermittent once  vancomycin  IVPB          SOCIAL HISTORY:  Smoker:  never  ETOH use: no      FAMILY HISTORY:  Family history of coronary artery disease  Family history of diabetes mellitus (Sibling)      VITALS:  Vital Signs Last 24 Hrs  T(C): 36.6 (23 Jun 2020 12:50), Max: 36.8 (23 Jun 2020 10:20)  T(F): 97.8 (23 Jun 2020 12:50), Max: 98.3 (23 Jun 2020 10:20)  HR: 85 (23 Jun 2020 12:50) (74 - 85)  BP: 132/77 (23 Jun 2020 12:50) (132/75 - 146/92)  BP(mean): --  RR: 20 (23 Jun 2020 12:50) (17 - 22)  SpO2: 96% (23 Jun 2020 12:50) (96% - 98%)    LABS/DIAGNOSTIC TESTS:                          13.4   7.09  )-----------( 209      ( 23 Jun 2020 07:31 )             42.7     WBC Count: 7.09 K/uL (06-23 @ 07:31)  WBC Count: 7.83 K/uL (06-22 @ 06:12)  WBC Count: 7.98 K/uL (06-21 @ 06:17)      06-23    132<L>  |  93<L>  |  89<H>  ----------------------------<  94  4.8   |  19<L>  |  10.60<H>    Ca    9.3      23 Jun 2020 07:31  Mg     2.0     06-22    TPro  7.4  /  Alb  3.1<L>  /  TBili  0.7  /  DBili  x   /  AST  18  /  ALT  25  /  AlkPhos  69  06-23          LIVER FUNCTIONS - ( 23 Jun 2020 07:31 )  Alb: 3.1 g/dL / Pro: 7.4 g/dL / ALK PHOS: 69 U/L / ALT: 25 U/L DA / AST: 18 U/L / GGT: x             PT/INR - ( 23 Jun 2020 07:31 )   PT: 30.4 sec;   INR: 2.61 ratio         PTT - ( 23 Jun 2020 07:31 )  PTT:33.4 sec    LACTATE:    ABG -     CULTURES:   .Blood Blood  06-18 @ 22:15   No growth to date.  --  --          RADIOLOGY:< from: Xray Tibia + Fibula 2 Views, Left (06.22.20 @ 10:15) >  EXAM:  LEG AP&LAT - LEFT                            PROCEDURE DATE:  06/22/2020          INTERPRETATION:  CLINICAL INDICATION:  Diabetic ulceration. Evaluate for osteomyelitis.    COMPARISON:  None    TECHNIQUE:  AP and lateral radiographs of the left lower leg performed.    FINDINGS:  There is no evidence for acute or chronic fracture deformity of the left tibia or fibula. No regions of osteolysis or osteosclerosis identified. No soft tissue swelling is identified. No retained radiodense foreign body noted. Extensive vascular calcification identified. The knee and ankle articulations appear intact. Note is made of a small plantar calcaneal spur as well as spurring along the posterior aspect of the calcaneus, superiorly directed.    IMPRESSION:  As above.        SHEFALI CANALES M.D., ATTENDING RADIOLOGIST  This document has been electronically signed. Jun 22 2020 11:24AM      -------------------------------------------------------------------------------------------------------------------------------------------------------------------  < from: US Duplex Venous Lower Ext Complete, Bilateral (06.20.20 @ 11:32) >  EXAM:  US DPLX LWR EXT VEINS COMPL BI                            PROCEDURE DATE:  06/20/2020          INTERPRETATION:  CLINICAL INFORMATION: Bilateral leg swelling    COMPARISON: Bilateral lower extremity venous duplex 11/16/2012    TECHNIQUE: Duplex sonography of the BILATERAL LOWER extremity veins with color and spectral Doppler, with and without compression.      FINDINGS:    There is normal compressibility of the bilateral common femoral, femoral and popliteal veins.   Doppler examination shows normal spontaneous and phasic flow.    No calf vein thrombosis is detected.    IMPRESSION:   No evidence of deep venous thrombosis in either lower extremity.          VANNA MAHER M.D., ATTENDING RADIOLOGIST  This document has been electronically signed. Jun 20 2020 11:37AM      ------------------------------------------------------------------------------------------------------------------------------------------------------------    < from: Xray Chest 1 View-PORTABLE IMMEDIATE (06.18.20 @ 17:48) >  EXAM:  XR CHEST PORTABLE IMMED 1V                            PROCEDURE DATE:  06/18/2020          INTERPRETATION:  AP semierect chest on June 18, 2020 at 5:52 PM. Patient has leg cellulitis.    Gross heart enlargement and mild to moderate left basepleural pulmonary reaction again noted. Sternotomy again seen.    IMPRESSION: Unchanged chest as above.        CAITLIN VALDEZ M.D., ATTENDING RADIOLOGIST  This document has been electronically signed. Jun 18 2020  5:50PM        < end of copied text >          ROS  [  ] UNABLE TO ELICIT

## 2020-06-23 NOTE — CONSULT NOTE ADULT - GASTROINTESTINAL DETAILS
soft/nontender/no masses palpable/no organomegaly/no rigidity/no distention/no guarding/bowel sounds normal

## 2020-06-23 NOTE — PROGRESS NOTE ADULT - SUBJECTIVE AND OBJECTIVE BOX
[facility] : Progress Note :: [phone], (office [phone]),   Dr Calle / Dr Esposito / Dr Macias / Dr Mendiola / Dr Narendra JULIEN / Dr Arroyo / Dr Knight / Dr Renan pickens  _____________________________________________________________________________________________    remains with pain in bilateral legs.  s/p HD earlier    lisinopril (Other)    Hospital Medications:   MEDICATIONS  (STANDING):  aMIOdarone    Tablet 200 milliGRAM(s) Oral daily  aspirin enteric coated 81 milliGRAM(s) Oral daily  calcium acetate 667 milliGRAM(s) Oral three times a day with meals  chlorhexidine 2% Cloths 1 Application(s) Topical daily  dextrose 5%. 1000 milliLiter(s) (50 mL/Hr) IV Continuous <Continuous>  glycopyrrolate 9 MICROgram(s)/formoterol 4.8 MICROgram(s) Inhaler 2 Puff(s) Inhalation two times a day  insulin glargine Injectable (LANTUS) 10 Unit(s) SubCutaneous at bedtime  insulin lispro (HumaLOG) corrective regimen sliding scale   SubCutaneous three times a day before meals  insulin lispro (HumaLOG) corrective regimen sliding scale   SubCutaneous at bedtime  insulin lispro Injectable (HumaLOG) 3 Unit(s) SubCutaneous three times a day before meals  ketotifen 0.025% Ophthalmic Solution 1 Drop(s) Both EYES two times a day  methimazole 5 milliGRAM(s) Oral daily  metoprolol tartrate 100 milliGRAM(s) Oral two times a day  pantoprazole    Tablet 40 milliGRAM(s) Oral before breakfast  simvastatin 40 milliGRAM(s) Oral at bedtime  tiotropium 18 MICROgram(s) Capsule 1 Capsule(s) Inhalation daily  warfarin 6 milliGRAM(s) Oral at bedtime      VITALS:  T(F): 97.4 (06-23-20 @ 15:47), Max: 98.3 (06-23-20 @ 10:20)  HR: 72 (06-23-20 @ 15:47)  BP: 134/64 (06-23-20 @ 15:47)  RR: 18 (06-23-20 @ 15:47)  SpO2: 95% (06-23-20 @ 15:47)  Wt(kg): --      PHYSICAL EXAM:  Constitutional: NAD  HEENT: anicteric sclera, oropharynx clear, MMM  Neck: No JVD  Respiratory: CTAB, no wheezes, rales or rhonchi  Cardiovascular: S1, S2, RRR  Gastrointestinal: BS+, soft, NT/ND  Extremities: bilateral leg swelling, tender  Neurological: A/O x 3, no focal deficits  : No CVA tenderness. No wagner.   Skin: No rashes  Vascular Access: AVF with thrill and bruti    LABS:  06-23    132<L>  |  93<L>  |  89<H>  ----------------------------<  94  4.8   |  19<L>  |  10.60<H>    Ca    9.3      23 Jun 2020 07:31  Mg     2.0     06-22    TPro  7.4  /  Alb  3.1<L>  /  TBili  0.7  /  DBili      /  AST  18  /  ALT  25  /  AlkPhos  69  06-23    Creatinine Trend: 10.60 <--, 9.06 <--, 6.66 <--, 8.05 <--, 5.99 <--, 4.94 <--                        13.4   7.09  )-----------( 209      ( 23 Jun 2020 07:31 )             42.7     Urine Studies:      RADIOLOGY & ADDITIONAL STUDIES:

## 2020-06-23 NOTE — PROGRESS NOTE ADULT - PROBLEM SELECTOR PLAN 4
Discharge instructions reviewed with patient. Prescriptions given for lisinopril, effient, and crestor and med info sheets provided for all new medications. Opportunity for questions provided. Patient voiced understanding of all discharge instructions. -on T/Th/S schedule with [facility]  -nephro Dr Calle  -HD today

## 2020-06-23 NOTE — PROGRESS NOTE ADULT - PROBLEM SELECTOR PLAN 1
-non healing cellulitis on left leg with increased swelling, erythema and tenderness of surrounding skin.   -repeat doppler US negative DVT on 6/20.   -s/p X ray result as above.  -s/p vanc and zosyn in ER  -blood cultures NGTD (6/18)  -on Unasyn and Doxy , not improving cellulitis  -ID dr. Rayo consulted and changed to Vancomycin renal dosing. d/c'd Unasyn and Doxy. -non healing cellulitis on left leg with increased swelling, erythema and tenderness of surrounding skin.   -repeat doppler US negative DVT on 6/20.   -s/p X ray result as above.  -s/p vanc and zosyn in ER  -blood cultures NGTD (6/18)  -on Unasyn and Doxy , not improving cellulitis. afebrile. no leukocytosis  -ID dr. Rayo consulted and changed to Vancomycin renal dosing. d/c'd Unasyn and Doxy.

## 2020-06-23 NOTE — PROGRESS NOTE ADULT - ASSESSMENT
Patient is a 69y Male whom presented to the hospital with LT leg pain and swelling.  Was sent from cardiology clinic where he had gone for routine evaluation as was noted  to have RT leg swelling, to R/O DVT. ( doppler US negative for DVT).admitted for treatment of leg cellulitis   Renal consulted for ESRD    A/P  #ESRD . next HD on Thursday.  # Anemia of CKD. stable off ROSARIO  # lytes and acid base at goal  started on vanco for bilateral leg cellulitis, Neg DVT.

## 2020-06-23 NOTE — PROGRESS NOTE ADULT - PROBLEM SELECTOR PLAN 5
-cont amiodarone and metoprolol, rate controlled   -dose Coumadin daily  -INR 2.61 today. (Goal 2-3)  -give Coumadin 6mg.  -Cardiology dr. Colin  -per cardiology, would consider heparin drip if INR continues subtherapeutic.

## 2020-06-24 DIAGNOSIS — M79.605 PAIN IN LEFT LEG: ICD-10-CM

## 2020-06-24 DIAGNOSIS — L03.90 CELLULITIS, UNSPECIFIED: ICD-10-CM

## 2020-06-24 LAB
ALBUMIN SERPL ELPH-MCNC: 3.1 G/DL — LOW (ref 3.5–5)
ALP SERPL-CCNC: 73 U/L — SIGNIFICANT CHANGE UP (ref 40–120)
ALT FLD-CCNC: 30 U/L DA — SIGNIFICANT CHANGE UP (ref 10–60)
ANION GAP SERPL CALC-SCNC: 17 MMOL/L — SIGNIFICANT CHANGE UP (ref 5–17)
APTT BLD: 34.6 SEC — SIGNIFICANT CHANGE UP (ref 27.5–36.3)
AST SERPL-CCNC: 26 U/L — SIGNIFICANT CHANGE UP (ref 10–40)
BILIRUB SERPL-MCNC: 0.6 MG/DL — SIGNIFICANT CHANGE UP (ref 0.2–1.2)
BUN SERPL-MCNC: 64 MG/DL — HIGH (ref 7–18)
CALCIUM SERPL-MCNC: 9 MG/DL — SIGNIFICANT CHANGE UP (ref 8.4–10.5)
CHLORIDE SERPL-SCNC: 91 MMOL/L — LOW (ref 96–108)
CO2 SERPL-SCNC: 23 MMOL/L — SIGNIFICANT CHANGE UP (ref 22–31)
CREAT SERPL-MCNC: 8.82 MG/DL — HIGH (ref 0.5–1.3)
GLUCOSE BLDC GLUCOMTR-MCNC: 195 MG/DL — HIGH (ref 70–99)
GLUCOSE BLDC GLUCOMTR-MCNC: 72 MG/DL — SIGNIFICANT CHANGE UP (ref 70–99)
GLUCOSE BLDC GLUCOMTR-MCNC: 92 MG/DL — SIGNIFICANT CHANGE UP (ref 70–99)
GLUCOSE BLDC GLUCOMTR-MCNC: 94 MG/DL — SIGNIFICANT CHANGE UP (ref 70–99)
GLUCOSE SERPL-MCNC: 99 MG/DL — SIGNIFICANT CHANGE UP (ref 70–99)
HCT VFR BLD CALC: 42.2 % — SIGNIFICANT CHANGE UP (ref 39–50)
HGB BLD-MCNC: 13.4 G/DL — SIGNIFICANT CHANGE UP (ref 13–17)
INR BLD: 4.11 RATIO — HIGH (ref 0.88–1.16)
MAGNESIUM SERPL-MCNC: 2 MG/DL — SIGNIFICANT CHANGE UP (ref 1.6–2.6)
MCHC RBC-ENTMCNC: 28.8 PG — SIGNIFICANT CHANGE UP (ref 27–34)
MCHC RBC-ENTMCNC: 31.8 GM/DL — LOW (ref 32–36)
MCV RBC AUTO: 90.6 FL — SIGNIFICANT CHANGE UP (ref 80–100)
MRSA PCR RESULT.: SIGNIFICANT CHANGE UP
NRBC # BLD: 0 /100 WBCS — SIGNIFICANT CHANGE UP (ref 0–0)
PHOSPHATE SERPL-MCNC: 5.9 MG/DL — HIGH (ref 2.5–4.5)
PLATELET # BLD AUTO: 207 K/UL — SIGNIFICANT CHANGE UP (ref 150–400)
POTASSIUM SERPL-MCNC: 4.6 MMOL/L — SIGNIFICANT CHANGE UP (ref 3.5–5.3)
POTASSIUM SERPL-SCNC: 4.6 MMOL/L — SIGNIFICANT CHANGE UP (ref 3.5–5.3)
PROT SERPL-MCNC: 7.4 G/DL — SIGNIFICANT CHANGE UP (ref 6–8.3)
PROTHROM AB SERPL-ACNC: 48.5 SEC — HIGH (ref 10–12.9)
RBC # BLD: 4.66 M/UL — SIGNIFICANT CHANGE UP (ref 4.2–5.8)
RBC # FLD: 14.8 % — HIGH (ref 10.3–14.5)
S AUREUS DNA NOSE QL NAA+PROBE: SIGNIFICANT CHANGE UP
SODIUM SERPL-SCNC: 131 MMOL/L — LOW (ref 135–145)
WBC # BLD: 8.13 K/UL — SIGNIFICANT CHANGE UP (ref 3.8–10.5)
WBC # FLD AUTO: 8.13 K/UL — SIGNIFICANT CHANGE UP (ref 3.8–10.5)

## 2020-06-24 PROCEDURE — 99222 1ST HOSP IP/OBS MODERATE 55: CPT

## 2020-06-24 RX ORDER — SENNA PLUS 8.6 MG/1
2 TABLET ORAL
Qty: 0 | Refills: 0 | DISCHARGE
Start: 2020-06-24

## 2020-06-24 RX ORDER — TRAMADOL HYDROCHLORIDE 50 MG/1
25 TABLET ORAL EVERY 8 HOURS
Refills: 0 | Status: DISCONTINUED | OUTPATIENT
Start: 2020-06-24 | End: 2020-06-24

## 2020-06-24 RX ORDER — SENNA PLUS 8.6 MG/1
2 TABLET ORAL AT BEDTIME
Refills: 0 | Status: DISCONTINUED | OUTPATIENT
Start: 2020-06-24 | End: 2020-07-15

## 2020-06-24 RX ORDER — ACETAMINOPHEN 500 MG
1000 TABLET ORAL ONCE
Refills: 0 | Status: COMPLETED | OUTPATIENT
Start: 2020-06-25 | End: 2020-06-25

## 2020-06-24 RX ORDER — ACETAMINOPHEN 500 MG
1000 TABLET ORAL ONCE
Refills: 0 | Status: COMPLETED | OUTPATIENT
Start: 2020-06-24 | End: 2020-06-24

## 2020-06-24 RX ORDER — GABAPENTIN 400 MG/1
100 CAPSULE ORAL
Refills: 0 | Status: DISCONTINUED | OUTPATIENT
Start: 2020-06-24 | End: 2020-06-30

## 2020-06-24 RX ADMIN — Medication 81 MILLIGRAM(S): at 11:26

## 2020-06-24 RX ADMIN — Medication 3 UNIT(S): at 11:35

## 2020-06-24 RX ADMIN — Medication 100 MILLIGRAM(S): at 05:26

## 2020-06-24 RX ADMIN — Medication 400 MILLIGRAM(S): at 17:15

## 2020-06-24 RX ADMIN — SIMVASTATIN 40 MILLIGRAM(S): 20 TABLET, FILM COATED ORAL at 21:46

## 2020-06-24 RX ADMIN — Medication 667 MILLIGRAM(S): at 17:14

## 2020-06-24 RX ADMIN — TRAMADOL HYDROCHLORIDE 25 MILLIGRAM(S): 50 TABLET ORAL at 05:30

## 2020-06-24 RX ADMIN — KETOTIFEN FUMARATE 1 DROP(S): 0.34 SOLUTION OPHTHALMIC at 17:14

## 2020-06-24 RX ADMIN — TIOTROPIUM BROMIDE 1 CAPSULE(S): 18 CAPSULE ORAL; RESPIRATORY (INHALATION) at 11:26

## 2020-06-24 RX ADMIN — Medication 1: at 11:35

## 2020-06-24 RX ADMIN — Medication 650 MILLIGRAM(S): at 11:34

## 2020-06-24 RX ADMIN — PANTOPRAZOLE SODIUM 40 MILLIGRAM(S): 20 TABLET, DELAYED RELEASE ORAL at 05:26

## 2020-06-24 RX ADMIN — INSULIN GLARGINE 10 UNIT(S): 100 INJECTION, SOLUTION SUBCUTANEOUS at 21:45

## 2020-06-24 RX ADMIN — Medication 667 MILLIGRAM(S): at 08:09

## 2020-06-24 RX ADMIN — Medication 667 MILLIGRAM(S): at 11:35

## 2020-06-24 RX ADMIN — CHLORHEXIDINE GLUCONATE 1 APPLICATION(S): 213 SOLUTION TOPICAL at 11:26

## 2020-06-24 RX ADMIN — GLYCOPYRROLATE AND FORMOTEROL FUMARATE 2 PUFF(S): 9; 4.8 AEROSOL, METERED RESPIRATORY (INHALATION) at 09:17

## 2020-06-24 RX ADMIN — AMIODARONE HYDROCHLORIDE 200 MILLIGRAM(S): 400 TABLET ORAL at 05:26

## 2020-06-24 RX ADMIN — KETOTIFEN FUMARATE 1 DROP(S): 0.34 SOLUTION OPHTHALMIC at 05:26

## 2020-06-24 RX ADMIN — Medication 100 MILLIGRAM(S): at 17:14

## 2020-06-24 RX ADMIN — SENNA PLUS 2 TABLET(S): 8.6 TABLET ORAL at 21:46

## 2020-06-24 RX ADMIN — GLYCOPYRROLATE AND FORMOTEROL FUMARATE 2 PUFF(S): 9; 4.8 AEROSOL, METERED RESPIRATORY (INHALATION) at 21:45

## 2020-06-24 RX ADMIN — GABAPENTIN 100 MILLIGRAM(S): 400 CAPSULE ORAL at 21:46

## 2020-06-24 NOTE — DIETITIAN INITIAL EVALUATION ADULT. - PROBLEM SELECTOR PLAN 1
-nonhealing wound on left leg with erythema and tenderness of surrounding skin, doppler negative for DVT outpt  -s/p vanc and zosyn in ER, will start unasyn  -f/u blood cultures,  -pain control  -may benefit from podiatry eval, will get xray to r/o osteo

## 2020-06-24 NOTE — PROGRESS NOTE ADULT - PROBLEM SELECTOR PLAN 1
-non healing cellulitis on left leg with increased swelling, erythema and tenderness of surrounding skin.   -repeat doppler US negative DVT on 6/20.   -s/p X ray result as above.  -s/p vanc and zosyn in ER  -blood cultures NGTD (6/18)  -d/c'd Unasyn and Doxy due to non healing cellulitis  -ID dr. Rayo consulted and abt changed to Vancomycin during HD.   -If cellulitis is improving w/ Vanco, plan to d/c with Vanco after HD tomorrow.  -Pain management consulted for left lower leg--> family dose not want percocet and morphine due to previous hx of delirium/behavior changes.

## 2020-06-24 NOTE — CONSULT NOTE ADULT - SUBJECTIVE AND OBJECTIVE BOX
Source of information: , Chart review, patient  Patient language: English  : n/a    CC:  left leg pain     HPI:  69M with CAD s/p multiple stents, s/p CABG (9/2019 course c/b Afib and LT pleural effusion), Diastolic CHF, HTN, HLD, DM2, Afib on coumadin and ESRD on HD (Tu, Thurs, Sat at [facility]), hyperthyroidism, presented to ER for pain and swelling of left leg since 3 days. Patient reports sustaining injury to posterior aspect of left leg 1 month ago when he accidentally hit his leg against his bed. He reports the wound has not healed since. Reports he developed pain and swelling of left leg since 3 days, 9/10 in severity, with difficulty ambulating. Reports he was advised to get doppler which he reviewed with hic cardiologist today, was told he has no clot and recommended to come to ER. Denies fever, chills, weakness, cough, dysuria, NVDC. (18 Jun 2020 19:29)    Patient is a 69y old  Male who presents with a chief complaint of leg pain (24 Jun 2020 12:10).  Pt is lying down, nad.  Pt states that left leg pain is present but pain is controlled at this time.  No nausea or vomiting.  Pt being treated for cellulitis of left leg with iv abx.  Pt is sensitive to pain meds - + delirium and aggression.         PAIN SCORE:  4/10    SCALE USED: (1-10 VNRS)      PAST MEDICAL & SURGICAL HISTORY:  ESRD (end stage renal disease) on dialysis: T-Th-Sat  Stented coronary artery: total 15 stents from 3926-2289  Hyperthyroidism  H/O: CVA: after cardiac stent placed 12/15/09 -no residual  Heart Attack: 2/1/07 with subsequent stent  Coronary Stent: 6403-6324 10 stents,  to Ramus 1/2015, cath 01/2016 ( see results in HPI)  Hypercholesterolemia  CAD (Coronary Artery Disease)  DM (Diabetes Mellitus): x 4 yrs without N/N/R  HTN (Hypertension)  S/P CABG x 4  Hemodialysis access, AV graft: 5/2015, L arm  S/P cataract extraction  Boil of Buttock: 2006 and 2008      FAMILY HISTORY:  Family history of coronary artery disease  Family history of diabetes mellitus (Sibling)        SOCIAL HISTORY:  [x] Denies Smoking, Alcohol, or Drug Use    Allergies    lisinopril (Other)    Intolerances        MEDICATIONS:    MEDICATIONS  (STANDING):  aMIOdarone    Tablet 200 milliGRAM(s) Oral daily  aspirin enteric coated 81 milliGRAM(s) Oral daily  calcium acetate 667 milliGRAM(s) Oral three times a day with meals  chlorhexidine 2% Cloths 1 Application(s) Topical daily  dextrose 5%. 1000 milliLiter(s) (50 mL/Hr) IV Continuous <Continuous>  glycopyrrolate 9 MICROgram(s)/formoterol 4.8 MICROgram(s) Inhaler 2 Puff(s) Inhalation two times a day  insulin glargine Injectable (LANTUS) 10 Unit(s) SubCutaneous at bedtime  insulin lispro (HumaLOG) corrective regimen sliding scale   SubCutaneous three times a day before meals  insulin lispro (HumaLOG) corrective regimen sliding scale   SubCutaneous at bedtime  insulin lispro Injectable (HumaLOG) 3 Unit(s) SubCutaneous three times a day before meals  ketotifen 0.025% Ophthalmic Solution 1 Drop(s) Both EYES two times a day  methimazole 5 milliGRAM(s) Oral daily  metoprolol tartrate 100 milliGRAM(s) Oral two times a day  pantoprazole    Tablet 40 milliGRAM(s) Oral before breakfast  simvastatin 40 milliGRAM(s) Oral at bedtime  tiotropium 18 MICROgram(s) Capsule 1 Capsule(s) Inhalation daily    MEDICATIONS  (PRN):  acetaminophen   Tablet .. 650 milliGRAM(s) Oral every 6 hours PRN Mild Pain (1 - 3)  ALBUTerol    90 MICROgram(s) HFA Inhaler 2 Puff(s) Inhalation every 6 hours PRN Shortness of Breath and/or Wheezing  traMADol 25 milliGRAM(s) Oral every 12 hours PRN Moderate Pain (4 - 6)      Vital Signs Last 24 Hrs  T(C): 37.2 (24 Jun 2020 07:54), Max: 37.2 (24 Jun 2020 07:54)  T(F): 99 (24 Jun 2020 07:54), Max: 99 (24 Jun 2020 07:54)  HR: 76 (24 Jun 2020 07:54) (70 - 76)  BP: 122/75 (24 Jun 2020 07:54) (122/75 - 134/64)  BP(mean): --  RR: 18 (24 Jun 2020 07:54) (18 - 18)  SpO2: 93% (24 Jun 2020 07:54) (93% - 97%)    LABS:                          13.4   8.13  )-----------( 207      ( 24 Jun 2020 06:46 )             42.2     06-24    131<L>  |  91<L>  |  64<H>  ----------------------------<  99  4.6   |  23  |  8.82<H>    Ca    9.0      24 Jun 2020 06:46  Phos  5.9     06-24  Mg     2.0     06-24    TPro  7.4  /  Alb  3.1<L>  /  TBili  0.6  /  DBili  x   /  AST  26  /  ALT  30  /  AlkPhos  73  06-24    PT/INR - ( 24 Jun 2020 06:46 )   PT: 48.5 sec;   INR: 4.11 ratio         PTT - ( 24 Jun 2020 06:46 )  PTT:34.6 sec  LIVER FUNCTIONS - ( 24 Jun 2020 06:46 )  Alb: 3.1 g/dL / Pro: 7.4 g/dL / ALK PHOS: 73 U/L / ALT: 30 U/L DA / AST: 26 U/L / GGT: x             CAPILLARY BLOOD GLUCOSE      POCT Blood Glucose.: 195 mg/dL (24 Jun 2020 11:30)  POCT Blood Glucose.: 92 mg/dL (24 Jun 2020 07:25)  POCT Blood Glucose.: 125 mg/dL (23 Jun 2020 21:34)  POCT Blood Glucose.: 195 mg/dL (23 Jun 2020 17:12)      REVIEW OF SYSTEMS:  CONSTITUTIONAL: No fever or fatigue  RESPIRATORY: No cough, wheezing, chills or hemoptysis; No shortness of breath  CARDIOVASCULAR: No chest pain, palpitations, dizziness, or leg swelling  GASTROINTESTINAL: No abdominal or epigastric pain. No nausea, vomiting; No diarrhea or constipation.   GENITOURINARY: + ckd - on hd  MUSCULOSKELETAL:  left leg pain + constant throbbing pain  NEURO: alert and oriented.  Periods of delirium yesterday   PSYCHIATRIC:  difficulty sleeping    PHYSICAL EXAM:  GENERAL:  Alert & Oriented X3 +drowsy  CHEST/LUNG: decreased breath sounds  HEART: Regular rate and rhythm; No murmurs, rubs, or gallops  ABDOMEN: Soft, Nontender, distended Bowel sounds present  : no hematuria  EXTREMITIES:  2+ Peripheral Pulses, No cyanosis, or edema  MUSCULOSKELETAL: + left leg edema and tenderness   NEUROLOGICAL: alert and oriented   SKIN: No rashes or lesions    Radiology:  < from: Xray Tibia + Fibula 2 Views, Left (06.22.20 @ 10:15) >  EXAM:  LEG AP&LAT - LEFT                            PROCEDURE DATE:  06/22/2020          INTERPRETATION:  CLINICAL INDICATION:  Diabetic ulceration. Evaluate for osteomyelitis.    COMPARISON:  None    TECHNIQUE:  AP and lateral radiographs of the left lower leg performed.    FINDINGS:  There is no evidence for acute or chronic fracture deformity of the left tibia or fibula. No regions of osteolysis or osteosclerosis identified. No soft tissue swelling is identified. No retained radiodense foreign body noted. Extensive vascular calcification identified. The knee and ankle articulations appear intact. Note is made of a small plantar calcaneal spur as well as spurring along the posterior aspect of the calcaneus, superiorly directed.    IMPRESSION:  As above.        < end of copied text >      Drug Screen:  aspirin enteric coated 81 milliGRAM(s) Oral daily          ORT Score -   Family Hx of substance abuse	Female	Male  Alcohol 	                                              1              3  Illegal drugs	                                      2              3  Rx drugs                                               4	      4  Personal Hx of substance abuse		  Alcohol 	                                               3	      3  Illegal drugs                                  	       4	      4  Rx drugs                                                5	      5  Age between 16- 45 years	               1             1  hx preadolescent sexual abuse	       3	      0  Psychological disease		  ADD, OCD, bipolar, schizophrenia	2	      2  Depression                                    	1	      1  Score totals 		  		  a score of 3 or lower indicates low risk for opioid abuse		  a score of 4-7 indicates moderate risk for opioid abuse		  a score of 8 or higher indicates high risk for opioid abuse	    Risk factors associated with adverse outcomes related to opioid treatment  [ ]  Concurrent benzodiazepine use  [ ]  History/ Active substance use or alcohol use disorder  [ ] Psychiatric co-morbidity  [ ] Sleep apnea  [ ] COPD  [ ] BMI> 35  [ ] Liver dysfunction  [ ] Renal dysfunction  [ ] CHF  [ ] Smoker  [ ]  Age > 60 years    4AT (Assessment test for delirium & cognitive impairment)  _________________________________________________________  [1] ALERTNESS  This includes patients who may be markedly drowsy (eg. difficult to rouse and/or obviously sleepy  during assessment) or agitated/hyperactive. Observe the patient. If asleep, attempt to wake with  speech or gentle touch on shoulder. Ask the patient to state their name and address to assist rating.  Normal (fully alert, but not agitated, throughout assessment) 0  Mild sleepiness for <10 seconds after waking, then normal 0  Clearly abnormal 4    [2] AMT4  Age, date of birth, place (name of the hospital or building), current year.  No mistakes 0  1 mistake 1  2 or more mistakes/untestable 2    [3] ATTENTION  Ask the patient: “Please tell me the months of the year in backwards order, starting at December.”  To assist initial understanding one prompt of “what is the month before December?” is permitted.  Months of the year backwards Achieves 7 months or more correctly 0  Starts but scores <7 months / refuses to start 1 Untestable (cannot start because unwell, drowsy, inattentive) 2    [4] ACUTE CHANGE OR FLUCTUATING COURSE  Evidence of significant change or fluctuation in: alertness, cognition, other mental function  (eg. paranoia, hallucinations) arising over the last 2 weeks and still evident in last 24hrs  No 0  Yes 4  4 or above: possible delirium +/- cognitive impairment  1-3: possible cognitive impairment  0: delirium or severe cognitive impairment unlikely (but delirium still possible if [4] information incomplete)  4AT SCORE      [ ]  [place]  Reviewed and Copied to Chart. See below.

## 2020-06-24 NOTE — PROGRESS NOTE ADULT - PROBLEM SELECTOR PLAN 5
-cont amiodarone and metoprolol, rate controlled   -dose Coumadin daily  -INR 4.11 today. (Goal 2-3)  -hold Coumadin (last given on 6/23 with 6mg)  -Cardiology dr. Colin  -per cardiology, would consider heparin drip if INR continues subtherapeutic.   -monitor INR

## 2020-06-24 NOTE — PROGRESS NOTE ADULT - ASSESSMENT
Patient is a 69y Male whom presented to the hospital with LT leg pain and swelling.  Was sent from cardiology clinic where he had gone for routine evaluation as was noted  to have RT leg swelling, to R/O DVT. ( doppler US negative for DVT).admitted for treatment of leg cellulitis   Renal consulted for ESRD    A/P  #ESRD . next HD on Thursday.  # Anemia of CKD. stable off ROSARIO  # lytes and acid base at goal  started on vanco for bilateral leg cellulitis with improvement of symptoms. Neg DVT.

## 2020-06-24 NOTE — PROGRESS NOTE ADULT - SUBJECTIVE AND OBJECTIVE BOX
NP Note discussed with  Primary Attending    Patient is a 69y old  Male who presents with a chief complaint of leg pain (23 Jun 2020 17:14)    HPI- HPI- 69M with CAD s/p multiple stents, s/p CABG (9/2019 course c/b Afib and LT pleural effusion), Diastolic CHF, HTN, HLD, DM2, Afib on coumadin and ESRD on HD (Tu, Thurs, Sat at [facility]), hyperthyroidism, presented to ER for pain and swelling of left leg since 3 days. Admited for cellulitis, started on Unasyn and Doxy. Followed by  Nepho Dr. Calle.   Pt is afebrile and no leukocytosis but cellulitis is not improving. negative for DVT on US. s/p X ray shows no acute fx and no soft tissue swelling.  ID consulted. Abt changed to Vancomycin per dr. Rayo.  Plan for discharge with vanco when leg swelling is improving.   Continues on HD (T/Th/S).  Echo to be done as per cardiology.   Pain management consulted for unrelieved lower leg pain w/ current meds.     INTERVAL HPI/OVERNIGHT EVENTS:  seen at bedside. c/o left lower leg pain, could not sleep well over night due to pain. spoke to son Randal-concerns about starting percocet or morphine for pain control due to previous hx of delirium/behavior changes.        MEDICATIONS  (STANDING):  aMIOdarone    Tablet 200 milliGRAM(s) Oral daily  aspirin enteric coated 81 milliGRAM(s) Oral daily  calcium acetate 667 milliGRAM(s) Oral three times a day with meals  chlorhexidine 2% Cloths 1 Application(s) Topical daily  dextrose 5%. 1000 milliLiter(s) (50 mL/Hr) IV Continuous <Continuous>  glycopyrrolate 9 MICROgram(s)/formoterol 4.8 MICROgram(s) Inhaler 2 Puff(s) Inhalation two times a day  insulin glargine Injectable (LANTUS) 10 Unit(s) SubCutaneous at bedtime  insulin lispro (HumaLOG) corrective regimen sliding scale   SubCutaneous three times a day before meals  insulin lispro (HumaLOG) corrective regimen sliding scale   SubCutaneous at bedtime  insulin lispro Injectable (HumaLOG) 3 Unit(s) SubCutaneous three times a day before meals  ketotifen 0.025% Ophthalmic Solution 1 Drop(s) Both EYES two times a day  methimazole 5 milliGRAM(s) Oral daily  metoprolol tartrate 100 milliGRAM(s) Oral two times a day  pantoprazole    Tablet 40 milliGRAM(s) Oral before breakfast  simvastatin 40 milliGRAM(s) Oral at bedtime  tiotropium 18 MICROgram(s) Capsule 1 Capsule(s) Inhalation daily    MEDICATIONS  (PRN):  acetaminophen   Tablet .. 650 milliGRAM(s) Oral every 6 hours PRN Mild Pain (1 - 3)  ALBUTerol    90 MICROgram(s) HFA Inhaler 2 Puff(s) Inhalation every 6 hours PRN Shortness of Breath and/or Wheezing  traMADol 25 milliGRAM(s) Oral every 12 hours PRN Moderate Pain (4 - 6)      __________________________________________________  REVIEW OF SYSTEMS:    CONSTITUTIONAL: No fever,   EYES: no acute visual disturbances  NECK: No pain or stiffness  RESPIRATORY: No cough; No shortness of breath  CARDIOVASCULAR: No chest pain, no palpitations  GASTROINTESTINAL: No pain. No nausea or vomiting; No diarrhea   NEUROLOGICAL: No headache or numbness, no tremors  MUSCULOSKELETAL: left lower leg pain (8/10).   GENITOURINARY: no dysuria, no frequency, no hesitancy  PSYCHIATRY: no depression , no anxiety  ALL OTHER  ROS negative        Vital Signs Last 24 Hrs  T(C): 37.2 (24 Jun 2020 07:54), Max: 37.2 (24 Jun 2020 07:54)  T(F): 99 (24 Jun 2020 07:54), Max: 99 (24 Jun 2020 07:54)  HR: 76 (24 Jun 2020 07:54) (70 - 85)  BP: 122/75 (24 Jun 2020 07:54) (122/75 - 134/64)  BP(mean): --  RR: 18 (24 Jun 2020 07:54) (18 - 20)  SpO2: 93% (24 Jun 2020 07:54) (93% - 97%)    ________________________________________________  PHYSICAL EXAM:  GENERAL: NAD. conversant. comprehensive  HEENT: Normocephalic;  conjunctivae and sclerae clear; moist mucous membranes;   NECK : supple  CHEST/LUNG: Clear to auscultation bilaterally with good air entry   HEART: S1 S2  regular; no murmurs, gallops or rubs  ABDOMEN: Soft, Nontender, Nondistended; Bowel sounds present  EXTREMITIES: no cyanosis; no edema on right leg. Improving edema to left leg, pitting +1. no calf tenderness. + Dialysis access LUE.   SKIN: warm and dry; left leg erythema improving  NERVOUS SYSTEM:  Awake and alert; Oriented  to place, person and time ; no new deficits    _________________________________________________  LABS:                        13.4   8.13  )-----------( 207      ( 24 Jun 2020 06:46 )             42.2     06-24    131<L>  |  91<L>  |  64<H>  ----------------------------<  99  4.6   |  23  |  8.82<H>    Ca    9.0      24 Jun 2020 06:46  Phos  5.9     06-24  Mg     2.0     06-24    TPro  7.4  /  Alb  3.1<L>  /  TBili  0.6  /  DBili  x   /  AST  26  /  ALT  30  /  AlkPhos  73  06-24    PT/INR - ( 24 Jun 2020 06:46 )   PT: 48.5 sec;   INR: 4.11 ratio         PTT - ( 24 Jun 2020 06:46 )  PTT:34.6 sec    CAPILLARY BLOOD GLUCOSE      POCT Blood Glucose.: 195 mg/dL (24 Jun 2020 11:30)  POCT Blood Glucose.: 92 mg/dL (24 Jun 2020 07:25)  POCT Blood Glucose.: 125 mg/dL (23 Jun 2020 21:34)  POCT Blood Glucose.: 195 mg/dL (23 Jun 2020 17:12)  POCT Blood Glucose.: 127 mg/dL (23 Jun 2020 12:18)        RADIOLOGY & ADDITIONAL TESTS:    Imaging  Reviewed:  YES  < from: Xray Tibia + Fibula 2 Views, Left (06.22.20 @ 10:15) >    EXAM:  LEG AP&LAT - LEFT                            PROCEDURE DATE:  06/22/2020          INTERPRETATION:  CLINICAL INDICATION:  Diabetic ulceration. Evaluate for osteomyelitis.    COMPARISON:  None    TECHNIQUE:  AP and lateral radiographs of the left lower leg performed.    FINDINGS:  There is no evidence for acute or chronic fracture deformity of the left tibia or fibula. No regions of osteolysis or osteosclerosis identified. No soft tissue swelling is identified. No retained radiodense foreign body noted. Extensive vascular calcification identified. The knee and ankle articulations appear intact. Note is made of a small plantar calcaneal spur as well as spurring along the posterior aspect of the calcaneus, superiorly directed.    IMPRESSION:  As above.                  SHEFALI CANALES M.D., ATTENDING RADIOLOGIST  This document has been electronically signed. Jun 22 2020 11:24AM                < end of copied text >        Consultant(s) Notes Reviewed:   YES  ID. Cardiologu, nephrology    Plan of care was discussed with patient and /or primary care giver; all questions and concerns were addressed NP Note discussed with  Primary Attending    Patient is a 69y old  Male who presents with a chief complaint of leg pain (23 Jun 2020 17:14)     HPI- 69M with CAD s/p multiple stents, s/p CABG (9/2019 course c/b Afib and LT pleural effusion), Diastolic CHF, HTN, HLD, DM2, Afib on coumadin and ESRD on HD (Tu, Thurs, Sat at [facility]), hyperthyroidism, presented to ER for pain and swelling of left leg since 3 days. Admited for cellulitis, started on Unasyn and Doxy. Followed by  Nepho Dr. Calle.   Pt is afebrile and no leukocytosis but cellulitis is not improving. negative for DVT on US. s/p X ray shows no acute fx and no soft tissue swelling.  ID consulted. Abt changed to Vancomycin per dr. Rayo.  Plan for discharge with vanco when leg swelling is improving.   Continues on HD (T/Th/S).  Echo to be done as per cardiology.   Pain management consulted for unrelieved lower leg pain w/ current meds.     INTERVAL HPI/OVERNIGHT EVENTS:  seen at bedside. c/o left lower leg pain, could not sleep well over night due to pain. spoke to son Randal-concerns about starting percocet or morphine for pain control due to previous hx of delirium/behavior changes.        MEDICATIONS  (STANDING):  aMIOdarone    Tablet 200 milliGRAM(s) Oral daily  aspirin enteric coated 81 milliGRAM(s) Oral daily  calcium acetate 667 milliGRAM(s) Oral three times a day with meals  chlorhexidine 2% Cloths 1 Application(s) Topical daily  dextrose 5%. 1000 milliLiter(s) (50 mL/Hr) IV Continuous <Continuous>  glycopyrrolate 9 MICROgram(s)/formoterol 4.8 MICROgram(s) Inhaler 2 Puff(s) Inhalation two times a day  insulin glargine Injectable (LANTUS) 10 Unit(s) SubCutaneous at bedtime  insulin lispro (HumaLOG) corrective regimen sliding scale   SubCutaneous three times a day before meals  insulin lispro (HumaLOG) corrective regimen sliding scale   SubCutaneous at bedtime  insulin lispro Injectable (HumaLOG) 3 Unit(s) SubCutaneous three times a day before meals  ketotifen 0.025% Ophthalmic Solution 1 Drop(s) Both EYES two times a day  methimazole 5 milliGRAM(s) Oral daily  metoprolol tartrate 100 milliGRAM(s) Oral two times a day  pantoprazole    Tablet 40 milliGRAM(s) Oral before breakfast  simvastatin 40 milliGRAM(s) Oral at bedtime  tiotropium 18 MICROgram(s) Capsule 1 Capsule(s) Inhalation daily    MEDICATIONS  (PRN):  acetaminophen   Tablet .. 650 milliGRAM(s) Oral every 6 hours PRN Mild Pain (1 - 3)  ALBUTerol    90 MICROgram(s) HFA Inhaler 2 Puff(s) Inhalation every 6 hours PRN Shortness of Breath and/or Wheezing  traMADol 25 milliGRAM(s) Oral every 12 hours PRN Moderate Pain (4 - 6)      __________________________________________________  REVIEW OF SYSTEMS:    CONSTITUTIONAL: No fever,   EYES: no acute visual disturbances  NECK: No pain or stiffness  RESPIRATORY: No cough; No shortness of breath  CARDIOVASCULAR: No chest pain, no palpitations  GASTROINTESTINAL: No pain. No nausea or vomiting; No diarrhea   NEUROLOGICAL: No headache or numbness, no tremors  MUSCULOSKELETAL: left lower leg pain (8/10).   GENITOURINARY: no dysuria, no frequency, no hesitancy  PSYCHIATRY: no depression , no anxiety  ALL OTHER  ROS negative        Vital Signs Last 24 Hrs  T(C): 37.2 (24 Jun 2020 07:54), Max: 37.2 (24 Jun 2020 07:54)  T(F): 99 (24 Jun 2020 07:54), Max: 99 (24 Jun 2020 07:54)  HR: 76 (24 Jun 2020 07:54) (70 - 85)  BP: 122/75 (24 Jun 2020 07:54) (122/75 - 134/64)  BP(mean): --  RR: 18 (24 Jun 2020 07:54) (18 - 20)  SpO2: 93% (24 Jun 2020 07:54) (93% - 97%)    ________________________________________________  PHYSICAL EXAM:  GENERAL: NAD. conversant. comprehensive  HEENT: Normocephalic;  conjunctivae and sclerae clear; moist mucous membranes;   NECK : supple  CHEST/LUNG: Clear to auscultation bilaterally with good air entry   HEART: S1 S2  regular; no murmurs, gallops or rubs  ABDOMEN: Soft, Nontender, Nondistended; Bowel sounds present  EXTREMITIES: no cyanosis; no edema on right leg. Improving edema to left leg, pitting +1. no calf tenderness. + Dialysis access LUE.   SKIN: warm and dry; left leg erythema improving  NERVOUS SYSTEM:  Awake and alert; Oriented  to place, person and time ; no new deficits    _________________________________________________  LABS:                        13.4   8.13  )-----------( 207      ( 24 Jun 2020 06:46 )             42.2     06-24    131<L>  |  91<L>  |  64<H>  ----------------------------<  99  4.6   |  23  |  8.82<H>    Ca    9.0      24 Jun 2020 06:46  Phos  5.9     06-24  Mg     2.0     06-24    TPro  7.4  /  Alb  3.1<L>  /  TBili  0.6  /  DBili  x   /  AST  26  /  ALT  30  /  AlkPhos  73  06-24    PT/INR - ( 24 Jun 2020 06:46 )   PT: 48.5 sec;   INR: 4.11 ratio         PTT - ( 24 Jun 2020 06:46 )  PTT:34.6 sec    CAPILLARY BLOOD GLUCOSE      POCT Blood Glucose.: 195 mg/dL (24 Jun 2020 11:30)  POCT Blood Glucose.: 92 mg/dL (24 Jun 2020 07:25)  POCT Blood Glucose.: 125 mg/dL (23 Jun 2020 21:34)  POCT Blood Glucose.: 195 mg/dL (23 Jun 2020 17:12)  POCT Blood Glucose.: 127 mg/dL (23 Jun 2020 12:18)        RADIOLOGY & ADDITIONAL TESTS:    Imaging  Reviewed:  YES  < from: Xray Tibia + Fibula 2 Views, Left (06.22.20 @ 10:15) >    EXAM:  LEG AP&LAT - LEFT                            PROCEDURE DATE:  06/22/2020          INTERPRETATION:  CLINICAL INDICATION:  Diabetic ulceration. Evaluate for osteomyelitis.    COMPARISON:  None    TECHNIQUE:  AP and lateral radiographs of the left lower leg performed.    FINDINGS:  There is no evidence for acute or chronic fracture deformity of the left tibia or fibula. No regions of osteolysis or osteosclerosis identified. No soft tissue swelling is identified. No retained radiodense foreign body noted. Extensive vascular calcification identified. The knee and ankle articulations appear intact. Note is made of a small plantar calcaneal spur as well as spurring along the posterior aspect of the calcaneus, superiorly directed.    IMPRESSION:  As above.                  SHEFALI CANALES M.D., ATTENDING RADIOLOGIST  This document has been electronically signed. Jun 22 2020 11:24AM                < end of copied text >        Consultant(s) Notes Reviewed:   YES  ID. Cardiologu, nephrology    Plan of care was discussed with patient and /or primary care giver; all questions and concerns were addressed

## 2020-06-24 NOTE — PROGRESS NOTE ADULT - ATTENDING COMMENTS
PATIENT IS SEEN AND EXAMINED    -  CELLULITIS OF LEFT LEG, DIABETIC LEFT FOOT ULCER  - IMPROVED ON VANCOMYCIN [PER ID RECOMMENDATION, SWITCHED FROM UNASYN+DOXY]. BLOOD  CXS NGTD. ID F/UP BY DR. DUEÑAS    -  ESRD ON HD - NEPHROLOGY CONSULT IN PROGRESS  - DIASTOLIC CHF - TTE W/ EVIDENCE OF RIGHT/LEFT SYSTOLIC DYSFXN, PULMONARY HTN, MR; CARDIOLOGY CONSULT IN PROGRESS  - A/FIB ON COUMADIN, W/ SUPRATHERAPEUTIC INR - HOLD COUMADIN TODAY  -  PAD, DIABETIC PERIPHERAL NEUROPATHY - PAIN MGMT CONSULT IN PROGRESS  - CASE DISCUSSED EXTENSIVELY WITH SON [SYLVAIN RENTERIA ] AND PATIENT  -  GI AND DVT PROPHYLAXIS    DONALD DIAZ M.D. COVERING EPHRAIM DIAZ M.D.

## 2020-06-24 NOTE — DIETITIAN INITIAL EVALUATION ADULT. - PROBLEM SELECTOR PLAN 4
TTS schedule with [facility],   last session of HD today, had 4 hours,   continue renal diet, phoslo,   monitor lytes, nephro consult Dr Calle

## 2020-06-24 NOTE — DIETITIAN INITIAL EVALUATION ADULT. - PROBLEM SELECTOR PLAN 5
rate controlled  c/w amiodarone  on coumadin 3mg, will give 4mg tonight given INR 1.58 (goal 2-3)  monitor INR daily

## 2020-06-24 NOTE — CONSULT NOTE ADULT - PROBLEM SELECTOR RECOMMENDATION 9
- + cellultis of LE  - abx changed to vanco  - avoid iv opioids  - pt on HD - no nsaids  - will order iv acetaminophen for 2 doses  - start gabapentin - renal dose  - stool softeners  - spoke to son who was concerned about pt's response to pain meds in past.  Pt was on oxycodone and morphine and became aggressive and hallucinated.  Will avoid for now.    - continue tramadol for now

## 2020-06-25 LAB
ALBUMIN SERPL ELPH-MCNC: 3.1 G/DL — LOW (ref 3.5–5)
ALP SERPL-CCNC: 88 U/L — SIGNIFICANT CHANGE UP (ref 40–120)
ALT FLD-CCNC: 54 U/L DA — SIGNIFICANT CHANGE UP (ref 10–60)
ANION GAP SERPL CALC-SCNC: 18 MMOL/L — HIGH (ref 5–17)
APTT BLD: 41.2 SEC — HIGH (ref 27.5–36.3)
AST SERPL-CCNC: 46 U/L — HIGH (ref 10–40)
BASOPHILS # BLD AUTO: 0.08 K/UL — SIGNIFICANT CHANGE UP (ref 0–0.2)
BASOPHILS NFR BLD AUTO: 0.7 % — SIGNIFICANT CHANGE UP (ref 0–2)
BILIRUB SERPL-MCNC: 0.7 MG/DL — SIGNIFICANT CHANGE UP (ref 0.2–1.2)
BUN SERPL-MCNC: 83 MG/DL — HIGH (ref 7–18)
CALCIUM SERPL-MCNC: 8.8 MG/DL — SIGNIFICANT CHANGE UP (ref 8.4–10.5)
CHLORIDE SERPL-SCNC: 89 MMOL/L — LOW (ref 96–108)
CO2 SERPL-SCNC: 18 MMOL/L — LOW (ref 22–31)
CREAT SERPL-MCNC: 10.6 MG/DL — HIGH (ref 0.5–1.3)
EOSINOPHIL # BLD AUTO: 0.11 K/UL — SIGNIFICANT CHANGE UP (ref 0–0.5)
EOSINOPHIL NFR BLD AUTO: 0.9 % — SIGNIFICANT CHANGE UP (ref 0–6)
GLUCOSE BLDC GLUCOMTR-MCNC: 101 MG/DL — HIGH (ref 70–99)
GLUCOSE BLDC GLUCOMTR-MCNC: 104 MG/DL — HIGH (ref 70–99)
GLUCOSE BLDC GLUCOMTR-MCNC: 119 MG/DL — HIGH (ref 70–99)
GLUCOSE BLDC GLUCOMTR-MCNC: 136 MG/DL — HIGH (ref 70–99)
GLUCOSE SERPL-MCNC: 171 MG/DL — HIGH (ref 70–99)
HCT VFR BLD CALC: 43.8 % — SIGNIFICANT CHANGE UP (ref 39–50)
HGB BLD-MCNC: 13.9 G/DL — SIGNIFICANT CHANGE UP (ref 13–17)
IMM GRANULOCYTES NFR BLD AUTO: 1.5 % — SIGNIFICANT CHANGE UP (ref 0–1.5)
INR BLD: 6.71 RATIO — CRITICAL HIGH (ref 0.88–1.16)
INR BLD: 6.85 RATIO — CRITICAL HIGH (ref 0.88–1.16)
LYMPHOCYTES # BLD AUTO: 1.34 K/UL — SIGNIFICANT CHANGE UP (ref 1–3.3)
LYMPHOCYTES # BLD AUTO: 11.4 % — LOW (ref 13–44)
MAGNESIUM SERPL-MCNC: 1.9 MG/DL — SIGNIFICANT CHANGE UP (ref 1.6–2.6)
MCHC RBC-ENTMCNC: 28.7 PG — SIGNIFICANT CHANGE UP (ref 27–34)
MCHC RBC-ENTMCNC: 31.7 GM/DL — LOW (ref 32–36)
MCV RBC AUTO: 90.3 FL — SIGNIFICANT CHANGE UP (ref 80–100)
MONOCYTES # BLD AUTO: 0.65 K/UL — SIGNIFICANT CHANGE UP (ref 0–0.9)
MONOCYTES NFR BLD AUTO: 5.6 % — SIGNIFICANT CHANGE UP (ref 2–14)
NEUTROPHILS # BLD AUTO: 9.36 K/UL — HIGH (ref 1.8–7.4)
NEUTROPHILS NFR BLD AUTO: 79.9 % — HIGH (ref 43–77)
NRBC # BLD: 2 /100 WBCS — HIGH (ref 0–0)
PHOSPHATE SERPL-MCNC: 8.5 MG/DL — HIGH (ref 2.5–4.5)
PLATELET # BLD AUTO: 206 K/UL — SIGNIFICANT CHANGE UP (ref 150–400)
POTASSIUM SERPL-MCNC: 5.3 MMOL/L — SIGNIFICANT CHANGE UP (ref 3.5–5.3)
POTASSIUM SERPL-SCNC: 5.3 MMOL/L — SIGNIFICANT CHANGE UP (ref 3.5–5.3)
PROT SERPL-MCNC: 7.4 G/DL — SIGNIFICANT CHANGE UP (ref 6–8.3)
PROTHROM AB SERPL-ACNC: 80.4 SEC — HIGH (ref 10–12.9)
PROTHROM AB SERPL-ACNC: 82.1 SEC — HIGH (ref 10–12.9)
RBC # BLD: 4.85 M/UL — SIGNIFICANT CHANGE UP (ref 4.2–5.8)
RBC # FLD: 15.2 % — HIGH (ref 10.3–14.5)
SODIUM SERPL-SCNC: 125 MMOL/L — LOW (ref 135–145)
WBC # BLD: 11.71 K/UL — HIGH (ref 3.8–10.5)
WBC # FLD AUTO: 11.71 K/UL — HIGH (ref 3.8–10.5)

## 2020-06-25 PROCEDURE — 99231 SBSQ HOSP IP/OBS SF/LOW 25: CPT

## 2020-06-25 RX ORDER — ACETAMINOPHEN 500 MG
650 TABLET ORAL EVERY 6 HOURS
Refills: 0 | Status: DISCONTINUED | OUTPATIENT
Start: 2020-06-25 | End: 2020-07-05

## 2020-06-25 RX ADMIN — SIMVASTATIN 40 MILLIGRAM(S): 20 TABLET, FILM COATED ORAL at 21:26

## 2020-06-25 RX ADMIN — Medication 250 MILLIGRAM(S): at 16:30

## 2020-06-25 RX ADMIN — Medication 100 MILLIGRAM(S): at 18:53

## 2020-06-25 RX ADMIN — KETOTIFEN FUMARATE 1 DROP(S): 0.34 SOLUTION OPHTHALMIC at 18:48

## 2020-06-25 RX ADMIN — AMIODARONE HYDROCHLORIDE 200 MILLIGRAM(S): 400 TABLET ORAL at 05:52

## 2020-06-25 RX ADMIN — Medication 81 MILLIGRAM(S): at 12:23

## 2020-06-25 RX ADMIN — Medication 667 MILLIGRAM(S): at 18:47

## 2020-06-25 RX ADMIN — GLYCOPYRROLATE AND FORMOTEROL FUMARATE 2 PUFF(S): 9; 4.8 AEROSOL, METERED RESPIRATORY (INHALATION) at 21:26

## 2020-06-25 RX ADMIN — GABAPENTIN 100 MILLIGRAM(S): 400 CAPSULE ORAL at 18:52

## 2020-06-25 RX ADMIN — Medication 650 MILLIGRAM(S): at 13:35

## 2020-06-25 RX ADMIN — CHLORHEXIDINE GLUCONATE 1 APPLICATION(S): 213 SOLUTION TOPICAL at 12:24

## 2020-06-25 RX ADMIN — KETOTIFEN FUMARATE 1 DROP(S): 0.34 SOLUTION OPHTHALMIC at 05:51

## 2020-06-25 RX ADMIN — INSULIN GLARGINE 10 UNIT(S): 100 INJECTION, SOLUTION SUBCUTANEOUS at 21:25

## 2020-06-25 RX ADMIN — Medication 100 MILLIGRAM(S): at 05:51

## 2020-06-25 RX ADMIN — Medication 667 MILLIGRAM(S): at 12:23

## 2020-06-25 RX ADMIN — Medication 400 MILLIGRAM(S): at 05:53

## 2020-06-25 RX ADMIN — GABAPENTIN 100 MILLIGRAM(S): 400 CAPSULE ORAL at 05:52

## 2020-06-25 RX ADMIN — PANTOPRAZOLE SODIUM 40 MILLIGRAM(S): 20 TABLET, DELAYED RELEASE ORAL at 05:51

## 2020-06-25 RX ADMIN — SENNA PLUS 2 TABLET(S): 8.6 TABLET ORAL at 21:26

## 2020-06-25 RX ADMIN — TIOTROPIUM BROMIDE 1 CAPSULE(S): 18 CAPSULE ORAL; RESPIRATORY (INHALATION) at 12:23

## 2020-06-25 NOTE — PROGRESS NOTE ADULT - PROBLEM SELECTOR PLAN 5
1. c/w amiodarone and metoprolol, rate controlled   2. INR 6.85 today - coumadin held tonight   3. Hold Coumadin (last given on 6/23 with 6mg)  4. Cardiology Dr. Colin  5. per cardiology, would consider heparin drip if INR continues subtherapeutic.   6. Daily INR (goal 23)

## 2020-06-25 NOTE — PROGRESS NOTE ADULT - PROBLEM SELECTOR PLAN 1
Pt with left lower leg pain which is somatic and neuropathic in nature due to cellulitis.   High risk medications reviewed. Avoid polypharmacy. Avoid IV opioids. Avoid NSAIDs and benzodiazepines. Non-opioid medications and non-pharmacological pain management measures initiated.   Opioid pain recommendations   - Maximize non-opioid pain recommendations   Non-opioid pain recommendations   - Continue Acetaminophen 650mg PO q 6 hours PRN moderate pain.   - Continue Gabapentin 100mg po q 12 hours, renally dosed.  - Avoid NSAIDs due to ESRD  Bowel Regimen  - Continue Senna 2 tablets at bedtime for constipation  Mild pain   - Non-pharmacological pain treatment recommendations  - Warm/ Cool packs PRN   - Repositioning extremity, elevation, imagery, relaxation, distraction.  - Physical therapy OOB if no contraindications   Recommendations discussed with primary team and RN

## 2020-06-25 NOTE — PROGRESS NOTE ADULT - ATTENDING COMMENTS
Rudy Mendiola MD  [facility]  Office [phone]  Ans Serv [phone] cell - [phone]

## 2020-06-25 NOTE — PROGRESS NOTE ADULT - SUBJECTIVE AND OBJECTIVE BOX
Patient denies CP, SOB Review of systems otherwise (-)    ALBUTerol    90 MICROgram(s) HFA Inhaler 2 Puff(s) Inhalation every 6 hours PRN  aMIOdarone    Tablet 200 milliGRAM(s) Oral daily  aspirin enteric coated 81 milliGRAM(s) Oral daily  calcium acetate 667 milliGRAM(s) Oral three times a day with meals  chlorhexidine 2% Cloths 1 Application(s) Topical daily  dextrose 5%. 1000 milliLiter(s) IV Continuous <Continuous>  gabapentin 100 milliGRAM(s) Oral two times a day  glycopyrrolate 9 MICROgram(s)/formoterol 4.8 MICROgram(s) Inhaler 2 Puff(s) Inhalation two times a day  insulin glargine Injectable (LANTUS) 10 Unit(s) SubCutaneous at bedtime  insulin lispro (HumaLOG) corrective regimen sliding scale   SubCutaneous three times a day before meals  insulin lispro (HumaLOG) corrective regimen sliding scale   SubCutaneous at bedtime  insulin lispro Injectable (HumaLOG) 3 Unit(s) SubCutaneous three times a day before meals  ketotifen 0.025% Ophthalmic Solution 1 Drop(s) Both EYES two times a day  methimazole 5 milliGRAM(s) Oral daily  metoprolol tartrate 100 milliGRAM(s) Oral two times a day  pantoprazole    Tablet 40 milliGRAM(s) Oral before breakfast  senna 2 Tablet(s) Oral at bedtime  simvastatin 40 milliGRAM(s) Oral at bedtime  tiotropium 18 MICROgram(s) Capsule 1 Capsule(s) Inhalation daily  vancomycin  IVPB 1000 milliGRAM(s) IV Intermittent <User Schedule>                            13.4   8.13  )-----------( 207      ( 24 Jun 2020 06:46 )             42.2       Hemoglobin: 13.4 g/dL (06-24 @ 06:46)  Hemoglobin: 13.4 g/dL (06-23 @ 07:31)  Hemoglobin: 13.1 g/dL (06-22 @ 06:12)  Hemoglobin: 12.5 g/dL (06-21 @ 06:17)  Hemoglobin: 12.7 g/dL (06-20 @ 11:56)      06-24    131<L>  |  91<L>  |  64<H>  ----------------------------<  99  4.6   |  23  |  8.82<H>    Ca    9.0      24 Jun 2020 06:46  Phos  5.9     06-24  Mg     2.0     06-24    TPro  7.4  /  Alb  3.1<L>  /  TBili  0.6  /  DBili  x   /  AST  26  /  ALT  30  /  AlkPhos  73  06-24    Creatinine Trend: 8.82<--, 10.60<--, 9.06<--, 6.66<--, 8.05<--, 5.99<--    COAGS:           T(C): 36.5 (06-25-20 @ 10:59), Max: 36.5 (06-25-20 @ 10:59)  HR: 82 (06-25-20 @ 10:59) (51 - 82)  BP: 117/65 (06-25-20 @ 10:59) (113/62 - 138/76)  RR: 18 (06-25-20 @ 10:59) (17 - 18)  SpO2: 93% (06-25-20 @ 10:59) (93% - 96%)  Wt(kg): --    I&O's Summary      Gen: Appears well in NAD  HEENT:  (-)icterus (-)pallor  CV: N S1 S2 1/6 DEJAH (+)2 Pulses B/l  Resp:  Clear to ausculatation B/L, normal effort  GI: (+) BS Soft, NT, ND  Lymph:  (+)Edema, (-)obvious lymphadenopathy  Skin: Warm to touch, Normal turgor  Psych: Appropriate mood and affect      ASSESSMENT/PLAN: 69y Male  CAD s/p multiple stents, s/p CABG (9/2019 course c/b Afib and LT pleural effusion), Normal LV systolic function, severe Diastolic, RV dysfx CHF, HTN, HLD, DM2, Afib on coumadin and ESRD on HD (Tu, Thurs, Sat at [facility]), hyperthyroidism, presented to ER for pain and swelling of left leg since 3 days.    - LE dopplers negative for dVT   - Continue with coumadin for goal INR 2-3    - continue with fluid removal with HD  - HD per renal  - Echo with moderately reduced LV function, not significantly different htan time of Bypass.  Stress with no new perfusion abnormalities.   - No need for further inpatient cardiac work up.      Murphy Colin MD, [facility]  BEEPER [phone]

## 2020-06-25 NOTE — PROGRESS NOTE ADULT - ATTENDING COMMENTS
PATIENT IS SEEN AND EXAMINED    -  CELLULITIS OF LEFT LEG, DIABETIC LEFT FOOT ULCER  - IMPROVED ON VANCOMYCIN [PER ID RECOMMENDATION, SWITCHED FROM UNASYN+DOXY]. BLOOD  CXS NGTD. ID F/UP BY DR. DUEÑAS    -  ESRD ON HD - NEPHROLOGY CONSULT IN PROGRESS  - DIASTOLIC CHF - TTE W/ EVIDENCE OF RIGHT/LEFT SYSTOLIC DYSFXN, PULMONARY HTN, MR, STRESS TEST TODAY WITHOUT NEW PERFUSION ABNORMALITIES; CARDIOLOGY CONSULT IN PROGRESS  - A/FIB ON COUMADIN, W/ SUPRATHERAPEUTIC INR - HOLD COUMADIN TODAY  -  PAD, DIABETIC PERIPHERAL NEUROPATHY - PAIN MGMT CONSULT IN PROGRESS  - CASE DISCUSSED EXTENSIVELY WITH SON [SYLVAIN RENTERIA ] AND PATIENT  -  GI AND DVT PROPHYLAXIS    DONALD DIAZ M.D. COVERING EPHRAIM DIAZ M.D.

## 2020-06-25 NOTE — PROGRESS NOTE ADULT - SUBJECTIVE AND OBJECTIVE BOX
This is a is a 68 y/o M with pmhx significant for CAD s/p multiple stents, s/p CABG (9/2019 course c/b Afib and LT pleural effusion), Diastolic CHF, HTN, HLD, DM2, Afib on coumadin and ESRD on HD (TTS), and hyperthyroidism whom presented to the ED with left leg pain and swelling. Sent from cardiology clinic where he had gone for routine evaluation as was noted  to have L leg swelling, to R/O DVT. ( doppler US negative for DVT)      MEDICATIONS  (STANDING):  aMIOdarone    Tablet 200 milliGRAM(s) Oral daily  aspirin enteric coated 81 milliGRAM(s) Oral daily  calcium acetate 667 milliGRAM(s) Oral three times a day with meals  chlorhexidine 2% Cloths 1 Application(s) Topical daily  dextrose 5%. 1000 milliLiter(s) (50 mL/Hr) IV Continuous <Continuous>  gabapentin 100 milliGRAM(s) Oral two times a day  glycopyrrolate 9 MICROgram(s)/formoterol 4.8 MICROgram(s) Inhaler 2 Puff(s) Inhalation two times a day  insulin glargine Injectable (LANTUS) 10 Unit(s) SubCutaneous at bedtime  insulin lispro (HumaLOG) corrective regimen sliding scale   SubCutaneous three times a day before meals  insulin lispro (HumaLOG) corrective regimen sliding scale   SubCutaneous at bedtime  insulin lispro Injectable (HumaLOG) 3 Unit(s) SubCutaneous three times a day before meals  ketotifen 0.025% Ophthalmic Solution 1 Drop(s) Both EYES two times a day  methimazole 5 milliGRAM(s) Oral daily  metoprolol tartrate 100 milliGRAM(s) Oral two times a day  pantoprazole    Tablet 40 milliGRAM(s) Oral before breakfast  senna 2 Tablet(s) Oral at bedtime  simvastatin 40 milliGRAM(s) Oral at bedtime  tiotropium 18 MICROgram(s) Capsule 1 Capsule(s) Inhalation daily  vancomycin  IVPB 1000 milliGRAM(s) IV Intermittent <User Schedule>    MEDICATIONS  (PRN):  acetaminophen   Tablet .. 650 milliGRAM(s) Oral every 6 hours PRN Moderate Pain (4 - 6)  ALBUTerol    90 MICROgram(s) HFA Inhaler 2 Puff(s) Inhalation every 6 hours PRN Shortness of Breath and/or Wheezing      __________________________________________________  REVIEW OF SYSTEMS:  CONSTITUTIONAL: No fever,   EYES: no acute visual disturbances  NECK: No pain or stiffness  RESPIRATORY: No cough; No shortness of breath  CARDIOVASCULAR: No chest pain, no palpitations  GASTROINTESTINAL: No pain. No nausea or vomiting; No diarrhea   NEUROLOGICAL: No headache or numbness, no tremors  MUSCULOSKELETAL: No joint pain, no muscle pain  GENITOURINARY: no dysuria, no frequency, no hesitancy  PSYCHIATRY: no depression , no anxiety  ALL OTHER  ROS negative        Vital Signs Last 24 Hrs  T(C): 36.3 (25 Jun 2020 14:53), Max: 36.5 (25 Jun 2020 10:59)  T(F): 97.4 (25 Jun 2020 14:53), Max: 97.7 (25 Jun 2020 10:59)  HR: 98 (25 Jun 2020 14:53) (64 - 98)  BP: 118/70 (25 Jun 2020 14:53) (113/62 - 138/76)  BP(mean): --  RR: 18 (25 Jun 2020 14:53) (17 - 18)  SpO2: 97% (25 Jun 2020 14:53) (93% - 97%)    ________________________________________________  PHYSICAL EXAM:  GENERAL: NAD  HEENT: Normocephalic;  conjunctivae and sclerae clear; moist mucous membranes;   NECK : supple  CHEST/LUNG: Clear to auscultation bilaterally with good air entry   HEART: S1 S2  regular; no murmurs, gallops or rubs  ABDOMEN: Soft, Nontender, Nondistended; Bowel sounds present  EXTREMITIES: LLE with erythema. b/l LE +1 non pitting edema   SKIN: warm and dry; no rash  NERVOUS SYSTEM:  Awake and alert; Oriented  to place, person and time ; no new deficits    _________________________________________________  LABS:                        13.9   11.71 )-----------( 206      ( 25 Jun 2020 14:59 )             43.8     06-25    125<L>  |  89<L>  |  83<H>  ----------------------------<  171<H>  5.3   |  18<L>  |  10.60<H>    Ca    8.8      25 Jun 2020 14:59  Phos  8.5     06-25  Mg     1.9     06-25    TPro  7.4  /  Alb  3.1<L>  /  TBili  0.7  /  DBili  x   /  AST  46<H>  /  ALT  54  /  AlkPhos  88  06-25    PT/INR - ( 25 Jun 2020 15:54 )   PT: 80.4 sec;   INR: 6.71 ratio         PTT - ( 25 Jun 2020 14:59 )  PTT:41.2 sec

## 2020-06-25 NOTE — PROGRESS NOTE ADULT - PROBLEM SELECTOR PLAN 8
1. Not in exacerbation   2. cont ASA, statin and BB  3. ECHO - EF 40-45%  4. Cardiology Dr. Colin following

## 2020-06-25 NOTE — PROGRESS NOTE ADULT - SUBJECTIVE AND OBJECTIVE BOX
Source of information: VIOLETTA RENTERIA, Chart review  Patient language: English  : n/a    HPI:  69M with CAD s/p multiple stents, s/p CABG (9/2019 course c/b Afib and LT pleural effusion), Diastolic CHF, HTN, HLD, DM2, Afib on coumadin and ESRD on HD (Tu, Thurs, Sat at [facility]), hyperthyroidism, presented to ER for pain and swelling of left leg since 3 days. Patient reports sustaining injury to posterior aspect of left leg 1 month ago when he accidentally hit his leg against his bed. He reports the wound has not healed since. Reports he developed pain and swelling of left leg since 3 days, 9/10 in severity, with difficulty ambulating. Reports he was advised to get doppler which he reviewed with hic cardiologist today, was told he has no clot and recommended to come to ER. Denies fever, chills, weakness, cough, dysuria, NVDC. (18 Jun 2020 19:29)      This is a Patient is a 69y old  Male who presents with a chief complaint of leg pain. Found to have left leg cellulitis, currently on vancomycin per primary team.  Pt seen and examined at bedside. Pt reports left leg pain around cellulitis region PAIN SCORE:  8/10 SCALE USED: (1-10 VNRS). Pt describes pain as aching, non- radiating, alleviated by pain medications, exacerbated by movement. Pt is able to ambulate to the bathroom with tolerable pain control. Pt tolerating PO diet. Pt denies lethargy, nausea, vomiting, constipation and itchiness. Reports last BM this morning. Patient stated goal for pain control: to be able to get out of bed to chair and ambulate with tolerable pain control. Plan is for HD today.     PAST MEDICAL & SURGICAL HISTORY:  ESRD (end stage renal disease) on dialysis: T-Th-Sat  Stented coronary artery: total 15 stents from 8149-8154  Hyperthyroidism  H/O: CVA: after cardiac stent placed 12/15/09 -no residual  Heart Attack: 2/1/07 with subsequent stent  Coronary Stent: [id number] 10 stents,  to Ramus 1/2015, cath 01/2016 ( see results in HPI)  Hypercholesterolemia  CAD (Coronary Artery Disease)  DM (Diabetes Mellitus): x 4 yrs without N/N/R  HTN (Hypertension)  S/P CABG x 4  Hemodialysis access, AV graft: 5/2015, L arm  S/P cataract extraction  Boil of Buttock: 2006 and 2008      FAMILY HISTORY:  Family history of coronary artery disease  Family history of diabetes mellitus (Sibling)      Social History:  denies smoking, etoh or illicit drug use (18 Jun 2020 19:29)    Allergies    lisinopril (Other)      MEDICATIONS  (STANDING):  aMIOdarone    Tablet 200 milliGRAM(s) Oral daily  aspirin enteric coated 81 milliGRAM(s) Oral daily  calcium acetate 667 milliGRAM(s) Oral three times a day with meals  chlorhexidine 2% Cloths 1 Application(s) Topical daily  dextrose 5%. 1000 milliLiter(s) (50 mL/Hr) IV Continuous <Continuous>  gabapentin 100 milliGRAM(s) Oral two times a day  glycopyrrolate 9 MICROgram(s)/formoterol 4.8 MICROgram(s) Inhaler 2 Puff(s) Inhalation two times a day  insulin glargine Injectable (LANTUS) 10 Unit(s) SubCutaneous at bedtime  insulin lispro (HumaLOG) corrective regimen sliding scale   SubCutaneous three times a day before meals  insulin lispro (HumaLOG) corrective regimen sliding scale   SubCutaneous at bedtime  insulin lispro Injectable (HumaLOG) 3 Unit(s) SubCutaneous three times a day before meals  ketotifen 0.025% Ophthalmic Solution 1 Drop(s) Both EYES two times a day  methimazole 5 milliGRAM(s) Oral daily  metoprolol tartrate 100 milliGRAM(s) Oral two times a day  pantoprazole    Tablet 40 milliGRAM(s) Oral before breakfast  senna 2 Tablet(s) Oral at bedtime  simvastatin 40 milliGRAM(s) Oral at bedtime  tiotropium 18 MICROgram(s) Capsule 1 Capsule(s) Inhalation daily  vancomycin  IVPB 1000 milliGRAM(s) IV Intermittent <User Schedule>    MEDICATIONS  (PRN):  acetaminophen   Tablet .. 650 milliGRAM(s) Oral every 6 hours PRN Moderate Pain (4 - 6)  ALBUTerol    90 MICROgram(s) HFA Inhaler 2 Puff(s) Inhalation every 6 hours PRN Shortness of Breath and/or Wheezing      Vital Signs Last 24 Hrs  T(C): 36.5 (25 Jun 2020 10:59), Max: 36.5 (25 Jun 2020 10:59)  T(F): 97.7 (25 Jun 2020 10:59), Max: 97.7 (25 Jun 2020 10:59)  HR: 82 (25 Jun 2020 10:59) (51 - 82)  BP: 117/65 (25 Jun 2020 10:59) (113/62 - 138/76)  BP(mean): --  RR: 18 (25 Jun 2020 10:59) (17 - 18)  SpO2: 93% (25 Jun 2020 10:59) (93% - 96%)  COVID-19 PCR: NotDetec (18 Jun 2020 17:21)    LABS: Reviewed                          13.4   8.13  )-----------( 207      ( 24 Jun 2020 06:46 )             42.2     06-24    131<L>  |  91<L>  |  64<H>  ----------------------------<  99  4.6   |  23  |  8.82<H>    Ca    9.0      24 Jun 2020 06:46  Phos  5.9     06-24  Mg     2.0     06-24    TPro  7.4  /  Alb  3.1<L>  /  TBili  0.6  /  DBili  x   /  AST  26  /  ALT  30  /  AlkPhos  73  06-24    PT/INR - ( 24 Jun 2020 06:46 )   PT: 48.5 sec;   INR: 4.11 ratio         PTT - ( 24 Jun 2020 06:46 )  PTT:34.6 sec  LIVER FUNCTIONS - ( 24 Jun 2020 06:46 )  Alb: 3.1 g/dL / Pro: 7.4 g/dL / ALK PHOS: 73 U/L / ALT: 30 U/L DA / AST: 26 U/L / GGT: x             CAPILLARY BLOOD GLUCOSE      POCT Blood Glucose.: 101 mg/dL (25 Jun 2020 11:16)  POCT Blood Glucose.: 104 mg/dL (25 Jun 2020 07:45)  POCT Blood Glucose.: 94 mg/dL (24 Jun 2020 21:30)  POCT Blood Glucose.: 72 mg/dL (24 Jun 2020 16:26)    COVID-19 PCR: NotDetec (18 Jun 2020 17:21)    Radiology:    < from: Xray Tibia + Fibula 2 Views, Left (06.22.20 @ 10:15) >  EXAM:  LEG AP&LAT - LEFT                            PROCEDURE DATE:  06/22/2020          INTERPRETATION:  CLINICAL INDICATION:  Diabetic ulceration. Evaluate for osteomyelitis.    COMPARISON:  None    TECHNIQUE:  AP and lateral radiographs of the left lower leg performed.    FINDINGS:  There is no evidence for acute or chronic fracture deformity of the left tibia or fibula. No regions of osteolysis or osteosclerosis identified. No soft tissue swelling is identified. No retained radiodense foreign body noted. Extensive vascular calcification identified. The knee and ankle articulations appear intact. Note is made of a small plantar calcaneal spur as well as spurring along the posterior aspect of the calcaneus, superiorly directed.    IMPRESSION:  As above.                  SHEFALI CANALES M.D., ATTENDING RADIOLOGIST  This document has been electronically signed. Jun 22 2020 11:24AM                < end of copied text >    < from: US Duplex Venous Lower Ext Complete, Bilateral (06.20.20 @ 11:32) >    EXAM:  US DPLX LWR EXT VEINS COMPL BI                            PROCEDURE DATE:  06/20/2020          INTERPRETATION:  CLINICAL INFORMATION: Bilateral leg swelling    COMPARISON: Bilateral lower extremity venous duplex 11/16/2012    TECHNIQUE: Duplex sonography of the BILATERAL LOWER extremity veins with color and spectral Doppler, with and without compression.      FINDINGS:    There is normal compressibility of the bilateral common femoral, femoral and popliteal veins.   Doppler examination shows normal spontaneous and phasic flow.    No calf vein thrombosis is detected.    IMPRESSION:   No evidence of deep venous thrombosis in either lower extremity.                    VANNA MAHER M.D., ATTENDING RADIOLOGIST  This document has been electronically signed. Jun 20 2020 11:37AM    < end of copied text >        < from: Xray Tibia + Fibula 2 Views, Left (06.22.20 @ 10:15) >  EXAM:  LEG AP&LAT - LEFT                            PROCEDURE DATE:  06/22/2020          INTERPRETATION:  CLINICAL INDICATION:  Diabetic ulceration. Evaluate for osteomyelitis.    COMPARISON:  None    TECHNIQUE:  AP and lateral radiographs of the left lower leg performed.    FINDINGS:  There is no evidence for acute or chronic fracture deformity of the left tibia or fibula. No regions of osteolysis or osteosclerosis identified. No soft tissue swelling is identified. No retained radiodense foreign body noted. Extensive vascular calcification identified. The knee and ankle articulations appear intact. Note is made of a small plantar calcaneal spur as well as spurring along the posterior aspect of the calcaneus, superiorly directed.    IMPRESSION:  As above.        < end of copied text >        ORT Score -   Family Hx of substance abuse	Female	Male  Alcohol 	                                              1              3  Illegal drugs	                                      2              3  Rx drugs                                               4	      4  Personal Hx of substance abuse		  Alcohol 	                                               3	      3  Illegal drugs                                  	       4	      4  Rx drugs                                                5	      5  Age between 16- 45 years	               1             1  hx preadolescent sexual abuse	       3	      0  Psychological disease		  ADD, OCD, bipolar, schizophrenia	2	      2  Depression                                    	1	      1  Score totals 0 		  		  a score of 3 or lower indicates low risk for opioid abuse		  a score of 4-7 indicates moderate risk for opioid abuse		  a score of 8 or higher indicates high risk for opioid abuse	    Risk factors associated with adverse outcomes related to opioid treatment  [ ]  Concurrent benzodiazepine use  [ ]  History/ Active substance use or alcohol use disorder  [ ] Psychiatric co-morbidity  [ ] Sleep apnea  [ ] COPD  [ ] BMI> 35  [ ] Liver dysfunction  [X ] Renal dysfunction  [ ] CHF  [ ] Smoker  [X ]  Age > 60 years    4AT (Assessment test for delirium & cognitive impairment)  _________________________________________________________  [1] ALERTNESS  This includes patients who may be markedly drowsy (eg. difficult to rouse and/or obviously sleepy  during assessment) or agitated/hyperactive. Observe the patient. If asleep, attempt to wake with  speech or gentle touch on shoulder. Ask the patient to state their name and address to assist rating.  Normal (fully alert, but not agitated, throughout assessment) 0  Mild sleepiness for <10 seconds after waking, then normal 0  Clearly abnormal 4    [2] AMT4  Age, date of birth, place (name of the hospital or building), current year.  No mistakes 0  1 mistake 1  2 or more mistakes/untestable 2    [3] ATTENTION  Ask the patient: “Please tell me the months of the year in backwards order, starting at December.”  To assist initial understanding one prompt of “what is the month before December?” is permitted.  Months of the year backwards Achieves 7 months or more correctly 0  Starts but scores <7 months / refuses to start 1 Untestable (cannot start because unwell, drowsy, inattentive) 2    [4] ACUTE CHANGE OR FLUCTUATING COURSE  Evidence of significant change or fluctuation in: alertness, cognition, other mental function  (eg. paranoia, hallucinations) arising over the last 2 weeks and still evident in last 24hrs  No 0  Yes 4    4 or above: possible delirium +/- cognitive impairment  1-3: possible cognitive impairment  0: delirium or severe cognitive impairment unlikely (but delirium still possible if [4] information incomplete)  4AT SCORE 4        REVIEW OF SYSTEMS:  CONSTITUTIONAL: No fever or fatigue  RESPIRATORY: No cough, wheezing, chills or hemoptysis; No shortness of breath  CARDIOVASCULAR: No chest pain, palpitations, dizziness, or leg swelling  GASTROINTESTINAL: No abdominal or epigastric pain. No nausea, vomiting; No diarrhea or constipation.   GENITOURINARY: anuric + ckd - on hd  MUSCULOSKELETAL:  left leg pain + constant throbbing pain  NEURO: alert and oriented.  Periods of delirium on 6/23  PSYCHIATRIC:  difficulty sleeping    PHYSICAL EXAM:  GENERAL:  Alert & Oriented X3  CHEST/LUNG: decreased breath sounds  HEART: Regular rate and rhythm; No murmurs, rubs, or gallops  ABDOMEN: Distended, Nontender, Bowel sounds present  : Anuric on HD  EXTREMITIES:  2+ Peripheral Pulses, No cyanosis, or edema; Rt upper arm HD AV fistua site c/d/i positive thrill and bruit   MUSCULOSKELETAL: + left leg moderate edema and tenderness   SKIN: + dried laceration over left shin noted + dried scab over left calf noted;     [ X]  [place]  Reviewed and Copied to Chart. See below.    Plan of care and goal oriented pain management treatment options were discussed with patient and /or primary care giver; all questions and concerns were addressed and care was aligned with patient's wishes.    Educated patient on goal oriented pain management treatment options     06-25-20 @ 13:23

## 2020-06-25 NOTE — PROGRESS NOTE ADULT - ASSESSMENT
×If you receive an error when searching the  Registry clear your cache and log back in.  Using Chrome: Delete your browsing History.  Using Internet Explorer:  Clear your browsing data.   Patient Search   Multi-Patient Search   Reports   Drug Listing   Designation   My ROGERS Numbers  Data Detail Level: Printer-Friendly View Extended View  Confidential Drug Utilization Report  Search Terms: jannie hardin, 1950Search Date: 06/24/2020 15:17:19 PM  The Drug Utilization Report below displays all of the controlled substance prescriptions, if any, that your patient has filled in the last twelve months. The information displayed on this report is compiled from pharmacy submissions to the Department, and accurately reflects the information as submitted by the pharmacies.    This report was requested by: French Pabon | Reference #: [id number]    Others' Prescriptions  Patient Name: Jannie HardinBirth Date: 1950  Address: [address] [place] [id number]: Male  Rx Written	Rx Dispensed	Drug	Quantity	Days Supply	Prescriber Name	Payment Method	Dispenser  10/06/2019	10/06/2019	oxycodone hcl 5 mg tablet	20	5	Bernardo Gary Jo	Medicaid	[facility]  * - Drugs marked with an asterisk are compound drugs. If the compound drug is made up of more than one controlled substance, then each controlled

## 2020-06-25 NOTE — PROGRESS NOTE ADULT - ASSESSMENT
Patient is a 69y Male whom presented to the hospital with LT leg pain and swelling.  Was sent from cardiology clinic where he had gone for routine evaluation as was noted  to have RT leg swelling, to R/O DVT. ( doppler US negative for DVT).admitted for treatment of leg cellulitis   Renal consulted for ESRD    A/P  #ESRD . HD today  # Anemia of CKD. stable off ROSARIO  # lytes and acid base at goal  started on vanco for bilateral leg cellulitis with improvement of symptoms. Gabapentin started   Neg DVT.

## 2020-06-25 NOTE — PROGRESS NOTE ADULT - PROBLEM SELECTOR PLAN 1
1. LLE cellulitis with increased swelling  2. c/w Vancomycin   3. Doppler US negative DVT on 6/20.   4. blood cultures NGTD (6/18)  5. ID Dr. Rayo consult appreciated   6. If cellulitis is improving w/ Vanco, plan to d/c with Vanco after HD tomorrow.  7. Pain management consulted for left lower leg--> Family dose not want percocet and morphine due to previous hx of delirium/behavior changes.  Tylenol PRN.

## 2020-06-25 NOTE — PROGRESS NOTE ADULT - PROBLEM SELECTOR PLAN 2
1. c/w Lantus 15 units and Humalog 5 units pre meal    2. c/w Lantus 10 units and Humalog 3 units and HSSC   3. BS monitoring AC/HS on ISS

## 2020-06-25 NOTE — PROGRESS NOTE ADULT - SUBJECTIVE AND OBJECTIVE BOX
[facility] : Progress Note :: [phone], (office [phone]),   Dr Calle / Dr Esposito / Dr Macias / Dr Mendiola / Dr Narendra JULIEN / Dr Arroyo / Dr Knight / Dr Renan pickens  _____________________________________________________________________________________________  Patient is a 69y Male with    lisinopril (Other)    Hospital Medications:   MEDICATIONS  (STANDING):  aMIOdarone    Tablet 200 milliGRAM(s) Oral daily  aspirin enteric coated 81 milliGRAM(s) Oral daily  calcium acetate 667 milliGRAM(s) Oral three times a day with meals  chlorhexidine 2% Cloths 1 Application(s) Topical daily  dextrose 5%. 1000 milliLiter(s) (50 mL/Hr) IV Continuous <Continuous>  gabapentin 100 milliGRAM(s) Oral two times a day  glycopyrrolate 9 MICROgram(s)/formoterol 4.8 MICROgram(s) Inhaler 2 Puff(s) Inhalation two times a day  insulin glargine Injectable (LANTUS) 10 Unit(s) SubCutaneous at bedtime  insulin lispro (HumaLOG) corrective regimen sliding scale   SubCutaneous three times a day before meals  insulin lispro (HumaLOG) corrective regimen sliding scale   SubCutaneous at bedtime  insulin lispro Injectable (HumaLOG) 3 Unit(s) SubCutaneous three times a day before meals  ketotifen 0.025% Ophthalmic Solution 1 Drop(s) Both EYES two times a day  methimazole 5 milliGRAM(s) Oral daily  metoprolol tartrate 100 milliGRAM(s) Oral two times a day  pantoprazole    Tablet 40 milliGRAM(s) Oral before breakfast  senna 2 Tablet(s) Oral at bedtime  simvastatin 40 milliGRAM(s) Oral at bedtime  tiotropium 18 MICROgram(s) Capsule 1 Capsule(s) Inhalation daily  vancomycin  IVPB 1000 milliGRAM(s) IV Intermittent <User Schedule>    REVIEW OF SYSTEMS:  CONSTITUTIONAL: No weakness, fevers or chills  EYES/ENT: No visual changes;  No vertigo or throat pain   NECK: No pain or stiffness  RESPIRATORY: No cough, wheezing, hemoptysis; No shortness of breath  CARDIOVASCULAR: No chest pain or palpitations.  GASTROINTESTINAL: No abdominal or epigastric pain. No nausea, vomiting, or hematemesis; No diarrhea or constipation. No melena or hematochezia.  GENITOURINARY: No dysuria, frequency, foamy urine, urinary urgency, incontinence or hematuria  NEUROLOGICAL: No numbness or weakness  SKIN: No itching, burning, rashes, or lesions   VASCULAR: No bilateral lower extremity edema.   All other review of systems is negative unless indicated above.    VITALS:  T(F): 97.7 (06-25-20 @ 10:59), Max: 97.7 (06-25-20 @ 10:59)  HR: 82 (06-25-20 @ 10:59)  BP: 117/65 (06-25-20 @ 10:59)  RR: 18 (06-25-20 @ 10:59)  SpO2: 93% (06-25-20 @ 10:59)  Wt(kg): --      PHYSICAL EXAM:  Constitutional: NAD  HEENT: anicteric sclera, oropharynx clear.  Neck: No JVD  Respiratory: CTAB, no wheezes, rales or rhonchi  Cardiovascular: S1, S2, RRR  Gastrointestinal: BS+, soft, NT/ND  Extremities: bilateral  peripheral edema+  Neurological: A/O x 3, no focal deficits  Vascular Access: AVF with thrill  and bruit    LABS:  06-24    131<L>  |  91<L>  |  64<H>  ----------------------------<  99  4.6   |  23  |  8.82<H>    Ca    9.0      24 Jun 2020 06:46  Phos  5.9     06-24  Mg     2.0     06-24    TPro  7.4  /  Alb  3.1<L>  /  TBili  0.6  /  DBili      /  AST  26  /  ALT  30  /  AlkPhos  73  06-24    Creatinine Trend: 8.82 <--, 10.60 <--, 9.06 <--, 6.66 <--, 8.05 <--, 5.99 <--, 4.94 <--                        13.4   8.13  )-----------( 178      ( 24 Jun 2020 06:46 )             42.2     Urine Studies:      RADIOLOGY & ADDITIONAL STUDIES:

## 2020-06-26 DIAGNOSIS — I27.20 PULMONARY HYPERTENSION, UNSPECIFIED: ICD-10-CM

## 2020-06-26 LAB
ANION GAP SERPL CALC-SCNC: 13 MMOL/L — SIGNIFICANT CHANGE UP (ref 5–17)
BASOPHILS # BLD AUTO: 0.07 K/UL — SIGNIFICANT CHANGE UP (ref 0–0.2)
BASOPHILS NFR BLD AUTO: 0.7 % — SIGNIFICANT CHANGE UP (ref 0–2)
BUN SERPL-MCNC: 54 MG/DL — HIGH (ref 7–18)
CALCIUM SERPL-MCNC: 8.9 MG/DL — SIGNIFICANT CHANGE UP (ref 8.4–10.5)
CHLORIDE SERPL-SCNC: 90 MMOL/L — LOW (ref 96–108)
CO2 SERPL-SCNC: 26 MMOL/L — SIGNIFICANT CHANGE UP (ref 22–31)
CREAT SERPL-MCNC: 8.56 MG/DL — HIGH (ref 0.5–1.3)
EOSINOPHIL # BLD AUTO: 0.09 K/UL — SIGNIFICANT CHANGE UP (ref 0–0.5)
EOSINOPHIL NFR BLD AUTO: 0.9 % — SIGNIFICANT CHANGE UP (ref 0–6)
GLUCOSE BLDC GLUCOMTR-MCNC: 103 MG/DL — HIGH (ref 70–99)
GLUCOSE BLDC GLUCOMTR-MCNC: 129 MG/DL — HIGH (ref 70–99)
GLUCOSE BLDC GLUCOMTR-MCNC: 132 MG/DL — HIGH (ref 70–99)
GLUCOSE BLDC GLUCOMTR-MCNC: 210 MG/DL — HIGH (ref 70–99)
GLUCOSE BLDC GLUCOMTR-MCNC: 52 MG/DL — LOW (ref 70–99)
GLUCOSE BLDC GLUCOMTR-MCNC: 77 MG/DL — SIGNIFICANT CHANGE UP (ref 70–99)
GLUCOSE BLDC GLUCOMTR-MCNC: 92 MG/DL — SIGNIFICANT CHANGE UP (ref 70–99)
GLUCOSE SERPL-MCNC: 99 MG/DL — SIGNIFICANT CHANGE UP (ref 70–99)
HCT VFR BLD CALC: 42 % — SIGNIFICANT CHANGE UP (ref 39–50)
HGB BLD-MCNC: 13.1 G/DL — SIGNIFICANT CHANGE UP (ref 13–17)
IMM GRANULOCYTES NFR BLD AUTO: 1 % — SIGNIFICANT CHANGE UP (ref 0–1.5)
INR BLD: 4.24 RATIO — HIGH (ref 0.88–1.16)
LYMPHOCYTES # BLD AUTO: 0.96 K/UL — LOW (ref 1–3.3)
LYMPHOCYTES # BLD AUTO: 10 % — LOW (ref 13–44)
MCHC RBC-ENTMCNC: 28.7 PG — SIGNIFICANT CHANGE UP (ref 27–34)
MCHC RBC-ENTMCNC: 31.2 GM/DL — LOW (ref 32–36)
MCV RBC AUTO: 91.9 FL — SIGNIFICANT CHANGE UP (ref 80–100)
MONOCYTES # BLD AUTO: 0.77 K/UL — SIGNIFICANT CHANGE UP (ref 0–0.9)
MONOCYTES NFR BLD AUTO: 8 % — SIGNIFICANT CHANGE UP (ref 2–14)
NEUTROPHILS # BLD AUTO: 7.65 K/UL — HIGH (ref 1.8–7.4)
NEUTROPHILS NFR BLD AUTO: 79.4 % — HIGH (ref 43–77)
NRBC # BLD: 2 /100 WBCS — HIGH (ref 0–0)
PLATELET # BLD AUTO: 176 K/UL — SIGNIFICANT CHANGE UP (ref 150–400)
POTASSIUM SERPL-MCNC: 4.5 MMOL/L — SIGNIFICANT CHANGE UP (ref 3.5–5.3)
POTASSIUM SERPL-SCNC: 4.5 MMOL/L — SIGNIFICANT CHANGE UP (ref 3.5–5.3)
PROTHROM AB SERPL-ACNC: 50.1 SEC — HIGH (ref 10–12.9)
RBC # BLD: 4.57 M/UL — SIGNIFICANT CHANGE UP (ref 4.2–5.8)
RBC # FLD: 15.2 % — HIGH (ref 10.3–14.5)
SARS-COV-2 RNA SPEC QL NAA+PROBE: SIGNIFICANT CHANGE UP
SODIUM SERPL-SCNC: 129 MMOL/L — LOW (ref 135–145)
WBC # BLD: 9.64 K/UL — SIGNIFICANT CHANGE UP (ref 3.8–10.5)
WBC # FLD AUTO: 9.64 K/UL — SIGNIFICANT CHANGE UP (ref 3.8–10.5)

## 2020-06-26 PROCEDURE — 71045 X-RAY EXAM CHEST 1 VIEW: CPT | Mod: 26

## 2020-06-26 PROCEDURE — 99231 SBSQ HOSP IP/OBS SF/LOW 25: CPT

## 2020-06-26 RX ORDER — INSULIN GLARGINE 100 [IU]/ML
5 INJECTION, SOLUTION SUBCUTANEOUS AT BEDTIME
Refills: 0 | Status: DISCONTINUED | OUTPATIENT
Start: 2020-06-26 | End: 2020-06-26

## 2020-06-26 RX ORDER — PIPERACILLIN AND TAZOBACTAM 4; .5 G/20ML; G/20ML
3.38 INJECTION, POWDER, LYOPHILIZED, FOR SOLUTION INTRAVENOUS EVERY 12 HOURS
Refills: 0 | Status: DISCONTINUED | OUTPATIENT
Start: 2020-06-26 | End: 2020-06-27

## 2020-06-26 RX ORDER — PIPERACILLIN AND TAZOBACTAM 4; .5 G/20ML; G/20ML
3.38 INJECTION, POWDER, LYOPHILIZED, FOR SOLUTION INTRAVENOUS ONCE
Refills: 0 | Status: COMPLETED | OUTPATIENT
Start: 2020-06-26 | End: 2020-06-26

## 2020-06-26 RX ORDER — LIDOCAINE 4 G/100G
1 CREAM TOPICAL EVERY 8 HOURS
Refills: 0 | Status: DISCONTINUED | OUTPATIENT
Start: 2020-06-26 | End: 2020-07-05

## 2020-06-26 RX ADMIN — LIDOCAINE 1 APPLICATION(S): 4 CREAM TOPICAL at 23:25

## 2020-06-26 RX ADMIN — LIDOCAINE 1 APPLICATION(S): 4 CREAM TOPICAL at 14:47

## 2020-06-26 RX ADMIN — Medication 667 MILLIGRAM(S): at 17:52

## 2020-06-26 RX ADMIN — Medication 2: at 11:53

## 2020-06-26 RX ADMIN — PIPERACILLIN AND TAZOBACTAM 25 GRAM(S): 4; .5 INJECTION, POWDER, LYOPHILIZED, FOR SOLUTION INTRAVENOUS at 18:28

## 2020-06-26 RX ADMIN — KETOTIFEN FUMARATE 1 DROP(S): 0.34 SOLUTION OPHTHALMIC at 17:53

## 2020-06-26 RX ADMIN — Medication 650 MILLIGRAM(S): at 12:29

## 2020-06-26 RX ADMIN — GLYCOPYRROLATE AND FORMOTEROL FUMARATE 2 PUFF(S): 9; 4.8 AEROSOL, METERED RESPIRATORY (INHALATION) at 13:47

## 2020-06-26 RX ADMIN — Medication 0: at 20:45

## 2020-06-26 RX ADMIN — Medication 100 MILLIGRAM(S): at 06:09

## 2020-06-26 RX ADMIN — AMIODARONE HYDROCHLORIDE 200 MILLIGRAM(S): 400 TABLET ORAL at 06:09

## 2020-06-26 RX ADMIN — CHLORHEXIDINE GLUCONATE 1 APPLICATION(S): 213 SOLUTION TOPICAL at 12:33

## 2020-06-26 RX ADMIN — Medication 100 MILLIGRAM(S): at 17:53

## 2020-06-26 RX ADMIN — SENNA PLUS 2 TABLET(S): 8.6 TABLET ORAL at 21:49

## 2020-06-26 RX ADMIN — KETOTIFEN FUMARATE 1 DROP(S): 0.34 SOLUTION OPHTHALMIC at 06:09

## 2020-06-26 RX ADMIN — GABAPENTIN 100 MILLIGRAM(S): 400 CAPSULE ORAL at 06:09

## 2020-06-26 RX ADMIN — PIPERACILLIN AND TAZOBACTAM 200 GRAM(S): 4; .5 INJECTION, POWDER, LYOPHILIZED, FOR SOLUTION INTRAVENOUS at 17:54

## 2020-06-26 RX ADMIN — Medication 3 UNIT(S): at 11:53

## 2020-06-26 RX ADMIN — Medication 667 MILLIGRAM(S): at 08:26

## 2020-06-26 RX ADMIN — Medication 81 MILLIGRAM(S): at 12:31

## 2020-06-26 RX ADMIN — GLYCOPYRROLATE AND FORMOTEROL FUMARATE 2 PUFF(S): 9; 4.8 AEROSOL, METERED RESPIRATORY (INHALATION) at 21:50

## 2020-06-26 RX ADMIN — PANTOPRAZOLE SODIUM 40 MILLIGRAM(S): 20 TABLET, DELAYED RELEASE ORAL at 06:10

## 2020-06-26 RX ADMIN — GABAPENTIN 100 MILLIGRAM(S): 400 CAPSULE ORAL at 13:49

## 2020-06-26 RX ADMIN — SIMVASTATIN 40 MILLIGRAM(S): 20 TABLET, FILM COATED ORAL at 21:50

## 2020-06-26 RX ADMIN — LIDOCAINE 1 APPLICATION(S): 4 CREAM TOPICAL at 12:32

## 2020-06-26 RX ADMIN — TIOTROPIUM BROMIDE 1 CAPSULE(S): 18 CAPSULE ORAL; RESPIRATORY (INHALATION) at 12:34

## 2020-06-26 RX ADMIN — Medication 650 MILLIGRAM(S): at 06:10

## 2020-06-26 RX ADMIN — Medication 667 MILLIGRAM(S): at 12:30

## 2020-06-26 NOTE — PHYSICAL THERAPY INITIAL EVALUATION ADULT - CRITERIA FOR SKILLED THERAPEUTIC INTERVENTIONS
rehab potential/predicted duration of therapy intervention/therapy frequency/anticipated discharge recommendation/impairments found

## 2020-06-26 NOTE — PROGRESS NOTE ADULT - SUBJECTIVE AND OBJECTIVE BOX
Source of information: VIOLETTA RENTERIA, Chart review  Patient language: English  : n/a    HPI:  69M with CAD s/p multiple stents, s/p CABG (9/2019 course c/b Afib and LT pleural effusion), Diastolic CHF, HTN, HLD, DM2, Afib on coumadin and ESRD on HD (Tu, Thurs, Sat at [facility]), hyperthyroidism, presented to ER for pain and swelling of left leg since 3 days. Patient reports sustaining injury to posterior aspect of left leg 1 month ago when he accidentally hit his leg against his bed. He reports the wound has not healed since. Reports he developed pain and swelling of left leg since 3 days, 9/10 in severity, with difficulty ambulating. Reports he was advised to get doppler which he reviewed with hic cardiologist today, was told he has no clot and recommended to come to ER. Denies fever, chills, weakness, cough, dysuria, NVDC. (18 Jun 2020 19:29)      This is a Patient is a 69y old  Male who presents with a chief complaint of leg pain. Found to have left leg cellulitis, currently on vancomycin per primary team.  Pt seen and examined at bedside. Pt reports left leg pain around cellulitis region PAIN SCORE:  8/10 SCALE USED: (1-10 VNRS). Pt describes pain as aching, non- radiating, alleviated by pain medications, exacerbated by movement. Pt is able to ambulate to the bathroom with tolerable pain control. Pt tolerating PO diet. Pt denies lethargy, nausea, vomiting, constipation and itchiness. Reports last BM 6/25. Patient stated goal for pain control: to be able to get out of bed to chair and ambulate with tolerable pain control.     PAST MEDICAL & SURGICAL HISTORY:  ESRD (end stage renal disease) on dialysis: T-Th-Sat  Stented coronary artery: total 15 stents from 8933-4853  Hyperthyroidism  H/O: CVA: after cardiac stent placed 12/15/09 -no residual  Heart Attack: 2/1/07 with subsequent stent  Coronary Stent: 6546-3635 10 stents,  to Ramus 1/2015, cath 01/2016 ( see results in HPI)  Hypercholesterolemia  CAD (Coronary Artery Disease)  DM (Diabetes Mellitus): x 4 yrs without N/N/R  HTN (Hypertension)  S/P CABG x 4  Hemodialysis access, AV graft: 5/2015, L arm  S/P cataract extraction  Boil of Buttock: 2006 and 2008      FAMILY HISTORY:  Family history of coronary artery disease  Family history of diabetes mellitus (Sibling)      Social History:  denies smoking, etoh or illicit drug use (18 Jun 2020 19:29)    Allergies    lisinopril (Other)    MEDICATIONS  (STANDING):  aMIOdarone    Tablet 200 milliGRAM(s) Oral daily  aspirin enteric coated 81 milliGRAM(s) Oral daily  calcium acetate 667 milliGRAM(s) Oral three times a day with meals  chlorhexidine 2% Cloths 1 Application(s) Topical daily  dextrose 5%. 1000 milliLiter(s) (50 mL/Hr) IV Continuous <Continuous>  gabapentin 100 milliGRAM(s) Oral two times a day  glycopyrrolate 9 MICROgram(s)/formoterol 4.8 MICROgram(s) Inhaler 2 Puff(s) Inhalation two times a day  insulin glargine Injectable (LANTUS) 10 Unit(s) SubCutaneous at bedtime  insulin lispro (HumaLOG) corrective regimen sliding scale   SubCutaneous three times a day before meals  insulin lispro (HumaLOG) corrective regimen sliding scale   SubCutaneous at bedtime  insulin lispro Injectable (HumaLOG) 3 Unit(s) SubCutaneous three times a day before meals  ketotifen 0.025% Ophthalmic Solution 1 Drop(s) Both EYES two times a day  lidocaine 2% Gel 1 Application(s) Topical every 8 hours  methimazole 5 milliGRAM(s) Oral daily  metoprolol tartrate 100 milliGRAM(s) Oral two times a day  pantoprazole    Tablet 40 milliGRAM(s) Oral before breakfast  senna 2 Tablet(s) Oral at bedtime  simvastatin 40 milliGRAM(s) Oral at bedtime  tiotropium 18 MICROgram(s) Capsule 1 Capsule(s) Inhalation daily  vancomycin  IVPB 1000 milliGRAM(s) IV Intermittent <User Schedule>    MEDICATIONS  (PRN):  acetaminophen   Tablet .. 650 milliGRAM(s) Oral every 6 hours PRN Moderate Pain (4 - 6)  ALBUTerol    90 MICROgram(s) HFA Inhaler 2 Puff(s) Inhalation every 6 hours PRN Shortness of Breath and/or Wheezing      Vital Signs Last 24 Hrs  T(C): 36.7 (26 Jun 2020 08:19), Max: 36.7 (25 Jun 2020 18:54)  T(F): 98 (26 Jun 2020 08:19), Max: 98.1 (25 Jun 2020 18:54)  HR: 77 (26 Jun 2020 10:00) (76 - 98)  BP: 97/57 (26 Jun 2020 08:19) (97/57 - 136/86)  BP(mean): --  RR: 18 (26 Jun 2020 08:19) (17 - 18)  SpO2: 99% (26 Jun 2020 10:00) (92% - 100%)  COVID-19 PCR: NotDetec (18 Jun 2020 17:21)    LABS: Reviewed                          13.1   9.64  )-----------( 176      ( 26 Jun 2020 06:34 )             42.0     06-26    129<L>  |  90<L>  |  54<H>  ----------------------------<  99  4.5   |  26  |  8.56<H>    Ca    8.9      26 Jun 2020 06:34  Phos  8.5     06-25  Mg     1.9     06-25    TPro  7.4  /  Alb  3.1<L>  /  TBili  0.7  /  DBili  x   /  AST  46<H>  /  ALT  54  /  AlkPhos  88  06-25    PT/INR - ( 26 Jun 2020 06:34 )   PT: 50.1 sec;   INR: 4.24 ratio         PTT - ( 25 Jun 2020 14:59 )  PTT:41.2 sec  LIVER FUNCTIONS - ( 25 Jun 2020 14:59 )  Alb: 3.1 g/dL / Pro: 7.4 g/dL / ALK PHOS: 88 U/L / ALT: 54 U/L DA / AST: 46 U/L / GGT: x             CAPILLARY BLOOD GLUCOSE      POCT Blood Glucose.: 103 mg/dL (26 Jun 2020 08:16)  POCT Blood Glucose.: 136 mg/dL (25 Jun 2020 21:06)  POCT Blood Glucose.: 119 mg/dL (25 Jun 2020 18:45)    COVID-19 PCR: NotDetec (18 Jun 2020 17:21)          Radiology:    < from: Xray Tibia + Fibula 2 Views, Left (06.22.20 @ 10:15) >  EXAM:  LEG AP&LAT - LEFT                            PROCEDURE DATE:  06/22/2020          INTERPRETATION:  CLINICAL INDICATION:  Diabetic ulceration. Evaluate for osteomyelitis.    COMPARISON:  None    TECHNIQUE:  AP and lateral radiographs of the left lower leg performed.    FINDINGS:  There is no evidence for acute or chronic fracture deformity of the left tibia or fibula. No regions of osteolysis or osteosclerosis identified. No soft tissue swelling is identified. No retained radiodense foreign body noted. Extensive vascular calcification identified. The knee and ankle articulations appear intact. Note is made of a small plantar calcaneal spur as well as spurring along the posterior aspect of the calcaneus, superiorly directed.    IMPRESSION:  As above.                  SHEFALI CANALES M.D., ATTENDING RADIOLOGIST  This document has been electronically signed. Jun 22 2020 11:24AM                < end of copied text >    < from: US Duplex Venous Lower Ext Complete, Bilateral (06.20.20 @ 11:32) >    EXAM:  US DPLX LWR EXT VEINS COMPL BI                            PROCEDURE DATE:  06/20/2020          INTERPRETATION:  CLINICAL INFORMATION: Bilateral leg swelling    COMPARISON: Bilateral lower extremity venous duplex 11/16/2012    TECHNIQUE: Duplex sonography of the BILATERAL LOWER extremity veins with color and spectral Doppler, with and without compression.      FINDINGS:    There is normal compressibility of the bilateral common femoral, femoral and popliteal veins.   Doppler examination shows normal spontaneous and phasic flow.    No calf vein thrombosis is detected.    IMPRESSION:   No evidence of deep venous thrombosis in either lower extremity.                    VANNA MAHER M.D., ATTENDING RADIOLOGIST  This document has been electronically signed. Jun 20 2020 11:37AM    < end of copied text >        < from: Xray Tibia + Fibula 2 Views, Left (06.22.20 @ 10:15) >  EXAM:  LEG AP&LAT - LEFT                            PROCEDURE DATE:  06/22/2020          INTERPRETATION:  CLINICAL INDICATION:  Diabetic ulceration. Evaluate for osteomyelitis.    COMPARISON:  None    TECHNIQUE:  AP and lateral radiographs of the left lower leg performed.    FINDINGS:  There is no evidence for acute or chronic fracture deformity of the left tibia or fibula. No regions of osteolysis or osteosclerosis identified. No soft tissue swelling is identified. No retained radiodense foreign body noted. Extensive vascular calcification identified. The knee and ankle articulations appear intact. Note is made of a small plantar calcaneal spur as well as spurring along the posterior aspect of the calcaneus, superiorly directed.    IMPRESSION:  As above.        < end of copied text >        ORT Score -   Family Hx of substance abuse	Female	Male  Alcohol 	                                              1              3  Illegal drugs	                                      2              3  Rx drugs                                               4	      4  Personal Hx of substance abuse		  Alcohol 	                                               3	      3  Illegal drugs                                  	       4	      4  Rx drugs                                                5	      5  Age between 16- 45 years	               1             1  hx preadolescent sexual abuse	       3	      0  Psychological disease		  ADD, OCD, bipolar, schizophrenia	2	      2  Depression                                    	1	      1  Score totals 0 		  		  a score of 3 or lower indicates low risk for opioid abuse		  a score of 4-7 indicates moderate risk for opioid abuse		  a score of 8 or higher indicates high risk for opioid abuse	    Risk factors associated with adverse outcomes related to opioid treatment  [ ]  Concurrent benzodiazepine use  [ ]  History/ Active substance use or alcohol use disorder  [ ] Psychiatric co-morbidity  [ ] Sleep apnea  [ ] COPD  [ ] BMI> 35  [ ] Liver dysfunction  [X ] Renal dysfunction  [ ] CHF  [ ] Smoker  [X ]  Age > 60 years    4AT (Assessment test for delirium & cognitive impairment)  _________________________________________________________  [1] ALERTNESS  This includes patients who may be markedly drowsy (eg. difficult to rouse and/or obviously sleepy  during assessment) or agitated/hyperactive. Observe the patient. If asleep, attempt to wake with  speech or gentle touch on shoulder. Ask the patient to state their name and address to assist rating.  Normal (fully alert, but not agitated, throughout assessment) 0  Mild sleepiness for <10 seconds after waking, then normal 0  Clearly abnormal 4    [2] AMT4  Age, date of birth, place (name of the hospital or building), current year.  No mistakes 0  1 mistake 1  2 or more mistakes/untestable 2    [3] ATTENTION  Ask the patient: “Please tell me the months of the year in backwards order, starting at December.”  To assist initial understanding one prompt of “what is the month before December?” is permitted.  Months of the year backwards Achieves 7 months or more correctly 0  Starts but scores <7 months / refuses to start 1 Untestable (cannot start because unwell, drowsy, inattentive) 2    [4] ACUTE CHANGE OR FLUCTUATING COURSE  Evidence of significant change or fluctuation in: alertness, cognition, other mental function  (eg. paranoia, hallucinations) arising over the last 2 weeks and still evident in last 24hrs  No 0  Yes 4    4 or above: possible delirium +/- cognitive impairment  1-3: possible cognitive impairment  0: delirium or severe cognitive impairment unlikely (but delirium still possible if [4] information incomplete)  4AT SCORE 0        REVIEW OF SYSTEMS:  CONSTITUTIONAL: No fever. Positive fatigue  RESPIRATORY: No cough, wheezing, chills or hemoptysis; No shortness of breath  CARDIOVASCULAR: No chest pain, palpitations, dizziness, or leg swelling  GASTROINTESTINAL: No abdominal or epigastric pain. No nausea, vomiting; No diarrhea or constipation.   GENITOURINARY: anuric + ckd - on hd  MUSCULOSKELETAL:  left leg pain + constant throbbing pain  NEURO: alert and oriented.  Periods of delirium on 6/23  PSYCHIATRIC:  difficulty sleeping    PHYSICAL EXAM:  GENERAL:  Alert & Oriented X3, Sleepy  CHEST/LUNG: Decreased breath sounds  HEART: Regular rate and rhythm; No murmurs, rubs, or gallops  ABDOMEN: Distended, Nontender, Bowel sounds present  : Anuric on HD  EXTREMITIES:  2+ Peripheral Pulses, No cyanosis, or edema; Rt upper arm HD AV fistua site c/d/i positive thrill and bruit   MUSCULOSKELETAL: + left leg moderate edema and tenderness   SKIN: + dried laceration over left shin noted + dried scab over left calf noted;     [ X]  [place]  Reviewed and Copied to Chart. See below.    Plan of care and goal oriented pain management treatment options were discussed with patient and /or primary care giver; all questions and concerns were addressed and care was aligned with patient's wishes.    Educated patient on goal oriented pain management treatment options     06-26-20 @ 11:24    06-25-20 @ 13:23

## 2020-06-26 NOTE — PROGRESS NOTE ADULT - PROBLEM SELECTOR PLAN 3
Aflutter on EKG   INR supratherapeutic, coumadin held tonight   Follow INR in AM 02 saturation 85% on room air, will likely need home 02   Follow up with pulmonologist as outpatient for sleep study  Chest xray ordered

## 2020-06-26 NOTE — PROGRESS NOTE ADULT - ASSESSMENT
×If you receive an error when searching the  Registry clear your cache and log back in.  Using Chrome: Delete your browsing History.  Using Internet Explorer:  Clear your browsing data.   Patient Search   Multi-Patient Search   Reports   Drug Listing   Designation   My ROGERS Numbers  Data Detail Level: Printer-Friendly View Extended View  Confidential Drug Utilization Report  Search Terms: jannie hardin, 1950Search Date: 06/24/2020 15:17:19 PM  The Drug Utilization Report below displays all of the controlled substance prescriptions, if any, that your patient has filled in the last twelve months. The information displayed on this report is compiled from pharmacy submissions to the Department, and accurately reflects the information as submitted by the pharmacies.    This report was requested by: French Pabon | Reference #: [id number]    Others' Prescriptions  Patient Name: Jannie HardinBirth Date: 1950  Address: [address] [place] [id number]: Male  Rx Written	Rx Dispensed	Drug	Quantity	Days Supply	Prescriber Name	Payment Method	Dispenser  10/06/2019	10/06/2019	oxycodone hcl 5 mg tablet	20	5	Bernardo Gary Jo	Medicaid	[facility]  * - Drugs marked with an asterisk are compound drugs. If the compound drug is made up of more than one controlled substance, then each controlled

## 2020-06-26 NOTE — PROGRESS NOTE ADULT - SUBJECTIVE AND OBJECTIVE BOX
Patient denies CP, SOB Review of systems otherwise (-)    acetaminophen   Tablet .. 650 milliGRAM(s) Oral every 6 hours PRN  ALBUTerol    90 MICROgram(s) HFA Inhaler 2 Puff(s) Inhalation every 6 hours PRN  aMIOdarone    Tablet 200 milliGRAM(s) Oral daily  aspirin enteric coated 81 milliGRAM(s) Oral daily  calcium acetate 667 milliGRAM(s) Oral three times a day with meals  chlorhexidine 2% Cloths 1 Application(s) Topical daily  dextrose 5%. 1000 milliLiter(s) IV Continuous <Continuous>  gabapentin 100 milliGRAM(s) Oral two times a day  glycopyrrolate 9 MICROgram(s)/formoterol 4.8 MICROgram(s) Inhaler 2 Puff(s) Inhalation two times a day  insulin glargine Injectable (LANTUS) 10 Unit(s) SubCutaneous at bedtime  insulin lispro (HumaLOG) corrective regimen sliding scale   SubCutaneous three times a day before meals  insulin lispro (HumaLOG) corrective regimen sliding scale   SubCutaneous at bedtime  insulin lispro Injectable (HumaLOG) 3 Unit(s) SubCutaneous three times a day before meals  ketotifen 0.025% Ophthalmic Solution 1 Drop(s) Both EYES two times a day  lidocaine 2% Gel 1 Application(s) Topical every 8 hours  methimazole 5 milliGRAM(s) Oral daily  metoprolol tartrate 100 milliGRAM(s) Oral two times a day  pantoprazole    Tablet 40 milliGRAM(s) Oral before breakfast  senna 2 Tablet(s) Oral at bedtime  simvastatin 40 milliGRAM(s) Oral at bedtime  tiotropium 18 MICROgram(s) Capsule 1 Capsule(s) Inhalation daily  vancomycin  IVPB 1000 milliGRAM(s) IV Intermittent <User Schedule>                            13.1   9.64  )-----------( 176      ( 26 Jun 2020 06:34 )             42.0       Hemoglobin: 13.1 g/dL (06-26 @ 06:34)  Hemoglobin: 13.9 g/dL (06-25 @ 14:59)  Hemoglobin: 13.4 g/dL (06-24 @ 06:46)  Hemoglobin: 13.4 g/dL (06-23 @ 07:31)  Hemoglobin: 13.1 g/dL (06-22 @ 06:12)      06-26    129<L>  |  90<L>  |  54<H>  ----------------------------<  99  4.5   |  26  |  8.56<H>    Ca    8.9      26 Jun 2020 06:34  Phos  8.5     06-25  Mg     1.9     06-25    TPro  7.4  /  Alb  3.1<L>  /  TBili  0.7  /  DBili  x   /  AST  46<H>  /  ALT  54  /  AlkPhos  88  06-25    Creatinine Trend: 8.56<--, 10.60<--, 8.82<--, 10.60<--, 9.06<--, 6.66<--    COAGS: PT/INR - ( 26 Jun 2020 06:34 )   PT: 50.1 sec;   INR: 4.24 ratio         T(C): 36.7 (06-26-20 @ 08:19), Max: 36.7 (06-25-20 @ 18:54)  HR: 77 (06-26-20 @ 10:00) (76 - 98)  BP: 97/57 (06-26-20 @ 08:19) (97/57 - 136/86)  RR: 18 (06-26-20 @ 08:19) (17 - 18)  SpO2: 99% (06-26-20 @ 10:00) (92% - 100%)  Wt(kg): --    I&O's Summary    25 Jun 2020 07:01  -  26 Jun 2020 07:00  --------------------------------------------------------  IN: 400 mL / OUT: 2600 mL / NET: -2200 mL        Gen: Appears well in NAD  HEENT:  (-)icterus (-)pallor  CV: N S1 S2 1/6 DEJAH (+)2 Pulses B/l  Resp:  Clear to ausculatation B/L, normal effort  GI: (+) BS Soft, NT, ND  Lymph:  (+)Edema, (-)obvious lymphadenopathy  Skin: Warm to touch, Normal turgor  Psych: Appropriate mood and affect      ASSESSMENT/PLAN: 69y Male  CAD s/p multiple stents, s/p CABG (9/2019 course c/b Afib and LT pleural effusion), Normal LV systolic function, severe Diastolic, RV dysfx CHF, HTN, HLD, DM2, Afib on coumadin and ESRD on HD (Tu, Thurs, Sat at [facility]), hyperthyroidism, presented to ER for pain and swelling of left leg since 3 days.    - LE dopplers negative for dVT   - Continue with coumadin for goal INR 2-3    - continue with fluid removal with HD  - HD per renal  - Echo with moderately reduced LV function, not significantly different than time of Bypass.  Stress with no new perfusion abnormalities.   - No need for further inpatient cardiac work up.  - d/c plan per med      Murphy Colin MD, [facility]  BEEPER [phone]

## 2020-06-26 NOTE — PROGRESS NOTE ADULT - PROBLEM SELECTOR PLAN 1
Afebrile, leukocytosis resolved  Cellulitis improving   Continue vanco with HD   Dopplers negative   Pain management following, avoid opiates per family   ID Dr. Rayo

## 2020-06-26 NOTE — PROGRESS NOTE ADULT - ATTENDING COMMENTS
PATIENT IS SEEN AND EXAMINED    -  CELLULITIS OF LEFT LEG, DIABETIC LEFT FOOT ULCER  - IMPROVED ON VANCOMYCIN [PER ID RECOMMENDATION, SWITCHED FROM UNASYN+DOXY]. BLOOD  CXS NGTD. ID F/UP BY DR. DUEÑAS    - HYPOXIA ON ROOM AIR - MAY BE RELATED TO PULMONARY HYPERTENSION - F/U CXR AND ARRANGE FOR HOME O2 - UPDATED : LARGE LEFT PLEURAL EFFUSION - PLAN FOR CT CHEST, PULMONOLOGY CONSULT PLACED.   -  ESRD ON HD - NEPHROLOGY CONSULT IN PROGRESS  - DIASTOLIC CHF - TTE W/ EVIDENCE OF RIGHT/LEFT SYSTOLIC DYSFXN, PULMONARY HTN, MR, STRESS TEST TODAY WITHOUT NEW PERFUSION ABNORMALITIES; CARDIOLOGY CONSULT IN PROGRESS  - A/FIB ON COUMADIN, W/ SUPRATHERAPEUTIC INR - HOLD COUMADIN TODAY  -  PAD, DIABETIC PERIPHERAL NEUROPATHY - PAIN MGMT CONSULT IN PROGRESS  - CASE DISCUSSED EXTENSIVELY WITH SON [SYLVAIN RENTERIA ] AND PATIENT  -  GI AND DVT PROPHYLAXIS    DONALD DIAZ M.D. COVERING EPHRAIM DIAZ M.D.

## 2020-06-26 NOTE — PROGRESS NOTE ADULT - ATTENDING COMMENTS
Rudy Mendiola MD  [facility]  Office [phone]  Ans Serv [phone] cell - [phone]

## 2020-06-26 NOTE — CHART NOTE - NSCHARTNOTEFT_GEN_A_CORE
Chest xray with increasing large left pleural effusion, possibly partially loculated. Discussed with Dr. Nicolas, will get chest CT non con, consult pulmonary, and add zosyn renally dosed.

## 2020-06-26 NOTE — PROGRESS NOTE ADULT - SUBJECTIVE AND OBJECTIVE BOX
[facility] : Progress Note :: [phone], (office [phone]),   Dr Calle / Dr Esposito / Dr Macias / Dr Mendiola / Dr Narendra JULIEN / Dr Arroyo / Dr Knight / Dr Renan pickens  _____________________________________________________________________________________________    noted with enlarging LT pleural effusion. A CT chest is planned.  remains with bilateral leg pains    lisinopril (Other)    Hospital Medications:   MEDICATIONS  (STANDING):  aMIOdarone    Tablet 200 milliGRAM(s) Oral daily  aspirin enteric coated 81 milliGRAM(s) Oral daily  calcium acetate 667 milliGRAM(s) Oral three times a day with meals  chlorhexidine 2% Cloths 1 Application(s) Topical daily  dextrose 5%. 1000 milliLiter(s) (50 mL/Hr) IV Continuous <Continuous>  gabapentin 100 milliGRAM(s) Oral two times a day  glycopyrrolate 9 MICROgram(s)/formoterol 4.8 MICROgram(s) Inhaler 2 Puff(s) Inhalation two times a day  insulin glargine Injectable (LANTUS) 10 Unit(s) SubCutaneous at bedtime  insulin lispro (HumaLOG) corrective regimen sliding scale   SubCutaneous three times a day before meals  insulin lispro (HumaLOG) corrective regimen sliding scale   SubCutaneous at bedtime  insulin lispro Injectable (HumaLOG) 3 Unit(s) SubCutaneous three times a day before meals  ketotifen 0.025% Ophthalmic Solution 1 Drop(s) Both EYES two times a day  lidocaine 2% Gel 1 Application(s) Topical every 8 hours  methimazole 5 milliGRAM(s) Oral daily  metoprolol tartrate 100 milliGRAM(s) Oral two times a day  pantoprazole    Tablet 40 milliGRAM(s) Oral before breakfast  piperacillin/tazobactam IVPB. 3.375 Gram(s) IV Intermittent once  piperacillin/tazobactam IVPB.. 3.375 Gram(s) IV Intermittent every 12 hours  senna 2 Tablet(s) Oral at bedtime  simvastatin 40 milliGRAM(s) Oral at bedtime  tiotropium 18 MICROgram(s) Capsule 1 Capsule(s) Inhalation daily  vancomycin  IVPB 1000 milliGRAM(s) IV Intermittent <User Schedule>        VITALS:  T(F): 97.5 (06-26-20 @ 16:14), Max: 98.1 (06-25-20 @ 18:54)  HR: 75 (06-26-20 @ 16:14)  BP: 120/66 (06-26-20 @ 16:14)  RR: 18 (06-26-20 @ 16:14)  SpO2: 100% (06-26-20 @ 16:14)  Wt(kg): --    06-25 @ 07:01  -  06-26 @ 07:00  --------------------------------------------------------  IN: 400 mL / OUT: 2600 mL / NET: -2200 mL        PHYSICAL EXAM:  Constitutional: NAD  HEENT: anicteric sclera, oropharynx clear.  Neck: No JVD  Respiratory: CTAB, no wheezes, rales or rhonchi  Cardiovascular: S1, S2, RRR  Gastrointestinal: BS+, soft, NT/ND  Extremities bilat  peripheral edema  Neurological: A/O x 3, no focal deficits  Vascular Access: AVF with thrill and bruit    LABS:  06-26    129<L>  |  90<L>  |  54<H>  ----------------------------<  99  4.5   |  26  |  8.56<H>    Ca    8.9      26 Jun 2020 06:34  Phos  8.5     06-25  Mg     1.9     06-25    TPro  7.4  /  Alb  3.1<L>  /  TBili  0.7  /  DBili      /  AST  46<H>  /  ALT  54  /  AlkPhos  88  06-25    Creatinine Trend: 8.56 <--, 10.60 <--, 8.82 <--, 10.60 <--, 9.06 <--, 6.66 <--, 8.05 <--                        13.1   9.64  )-----------( 153      ( 26 Jun 2020 06:34 )             42.0     Urine Studies:      RADIOLOGY & ADDITIONAL STUDIES:

## 2020-06-26 NOTE — CHART NOTE - NSCHARTNOTEFT_GEN_A_CORE
Blood glucose 52. Pt alert, given food and juice with improvement in blood glucose to 77. RN to recheck blood glucose after patient finishes dinner.   Lantus decreased to 5 units. Premeal humalog 3 units dc'd. Blood glucose 52. Pt alert, given food and juice with improvement in blood glucose to 92. Discussed with Dr. Nicolas, plan to hold ayan Cosentialchristy.

## 2020-06-26 NOTE — PROGRESS NOTE ADULT - ASSESSMENT
Patient is a 69y Male whom presented to the hospital with LT leg pain and swelling.  Was sent from cardiology clinic where he had gone for routine evaluation as was noted  to have RT leg swelling, to R/O DVT. ( doppler US negative for DVT).admitted for treatment of leg cellulitis   Renal consulted for ESRD    A/P  #ESRD . HD in AM  # Anemia of CKD. stable off ROSARIO  # lytes and acid base at goal  started on vanco for bilateral leg cellulitis with improvement of symptoms. on    Neg DVT.   noted with loculated LT pleural effusion. zosyn added. F/U CT scan results

## 2020-06-26 NOTE — PROGRESS NOTE ADULT - PROBLEM SELECTOR PLAN 1
Pt with left lower leg pain which is somatic and neuropathic in nature due to cellulitis.   High risk medications reviewed. Avoid polypharmacy. Avoid IV opioids. Avoid NSAIDs and benzodiazepines. Non-opioid medications and non-pharmacological pain management measures initiated.   Opioid pain recommendations   - Maximize non-opioid pain recommendations. Per pt's family, pt is sensitive to opioids, experiences delirium and/or aggression.  Non-opioid pain recommendations   - Continue Acetaminophen 650mg PO q 6 hours PRN moderate pain.   - Continue Gabapentin 100mg po q 12 hours, renally dosed.  - Avoid NSAIDs due to ESRD  - Lidocaine gel to b/l legs q8h.   Bowel Regimen  - Continue Senna 2 tablets at bedtime for constipation  Mild pain   - Non-pharmacological pain treatment recommendations  - Warm/ Cool packs PRN   - Repositioning extremity, elevation, imagery, relaxation, distraction.  - Physical therapy OOB if no contraindications   Recommendations discussed with primary team and RN

## 2020-06-26 NOTE — PROGRESS NOTE ADULT - PROBLEM SELECTOR PLAN 2
HD T/TH/Sat  Pt to get vanco in HD after discharge HD T/TH/Sat  Pt to get vanco in HD after discharge  Nephrology Dr. Calle

## 2020-06-26 NOTE — PROGRESS NOTE ADULT - PROBLEM SELECTOR PLAN 7
DVT ppx - on coumadin, INR supratherpeutic 4.24   GI ppx - protonix a1c 7.1   Continue lantus, pre meals and sliding scale

## 2020-06-26 NOTE — PROGRESS NOTE ADULT - PROBLEM SELECTOR PLAN 6
02 saturation 85% on room air, will likely need home 02   Follow up with pulmonologist as outpatient for sleep study Continue methimazole

## 2020-06-26 NOTE — PROGRESS NOTE ADULT - SUBJECTIVE AND OBJECTIVE BOX
NP Note discussed with  Primary Attending    A 69M with CAD s/p multiple stents, s/p CABG (9/2019 course c/b Afib and LT pleural effusion), Diastolic CHF, HTN, HLD, DM2, Afib on coumadin and ESRD on HD (Tu, Thurs, Sat at [facility]), hyperthyroidism, presented to ER for pain and swelling of left leg since 3 days. Admitted to medicine for LLE cellulitis.  CXR shows gross heart enlargement and mild to moderate left base pleural pulmonary reaction again noted. Sternotomy again seen. Negative for DVT on Doppler study. Blood Cx NGTD. Pt was started on Unasyn and po Doxy, but changed to Vancomycin due to non healing cellulitis. ID consulted.  Improved leg swelling and erythema with Vanco.  Plan to d/c home with Vancomycin to be given during HD as per ID. Pt. with ESRD on HD, followed by nephro Dr. Calle, HD tolerated. Pt. with significant cardiac hx including AF on Coumadin, followed by cardiology. Coumadin dose adjusted for goal INR 2-3. ECHO resulted EF 40-45% (previous EF 55% in Jan. 2020). Stress test with mediume sized moderate to severe defect in the basal to mid lateral wall that is predominantly fixed. Patient evaluated by pain management for left lower leg pain. Pain controlled w/ current pain medications.     INTERVAL HPI/OVERNIGHT EVENTS: no new complaints    MEDICATIONS  (STANDING):  aMIOdarone    Tablet 200 milliGRAM(s) Oral daily  aspirin enteric coated 81 milliGRAM(s) Oral daily  calcium acetate 667 milliGRAM(s) Oral three times a day with meals  chlorhexidine 2% Cloths 1 Application(s) Topical daily  dextrose 5%. 1000 milliLiter(s) (50 mL/Hr) IV Continuous <Continuous>  gabapentin 100 milliGRAM(s) Oral two times a day  glycopyrrolate 9 MICROgram(s)/formoterol 4.8 MICROgram(s) Inhaler 2 Puff(s) Inhalation two times a day  insulin glargine Injectable (LANTUS) 10 Unit(s) SubCutaneous at bedtime  insulin lispro (HumaLOG) corrective regimen sliding scale   SubCutaneous three times a day before meals  insulin lispro (HumaLOG) corrective regimen sliding scale   SubCutaneous at bedtime  insulin lispro Injectable (HumaLOG) 3 Unit(s) SubCutaneous three times a day before meals  ketotifen 0.025% Ophthalmic Solution 1 Drop(s) Both EYES two times a day  lidocaine 2% Gel 1 Application(s) Topical every 8 hours  methimazole 5 milliGRAM(s) Oral daily  metoprolol tartrate 100 milliGRAM(s) Oral two times a day  pantoprazole    Tablet 40 milliGRAM(s) Oral before breakfast  senna 2 Tablet(s) Oral at bedtime  simvastatin 40 milliGRAM(s) Oral at bedtime  tiotropium 18 MICROgram(s) Capsule 1 Capsule(s) Inhalation daily  vancomycin  IVPB 1000 milliGRAM(s) IV Intermittent <User Schedule>    MEDICATIONS  (PRN):  acetaminophen   Tablet .. 650 milliGRAM(s) Oral every 6 hours PRN Moderate Pain (4 - 6)  ALBUTerol    90 MICROgram(s) HFA Inhaler 2 Puff(s) Inhalation every 6 hours PRN Shortness of Breath and/or Wheezing      __________________________________________________  REVIEW OF SYSTEMS:    CONSTITUTIONAL: No fever,   EYES: no acute visual disturbances  NECK: No pain or stiffness  RESPIRATORY: No cough; No shortness of breath  CARDIOVASCULAR: No chest pain, no palpitations  GASTROINTESTINAL: No pain. No nausea or vomiting; No diarrhea   NEUROLOGICAL: No headache or numbness, no tremors  MUSCULOSKELETAL: No joint pain, no muscle pain  GENITOURINARY: no dysuria, no frequency, no hesitancy  PSYCHIATRY: no depression , no anxiety  ALL OTHER  ROS negative        Vital Signs Last 24 Hrs  T(C): 36.7 (26 Jun 2020 08:19), Max: 36.7 (25 Jun 2020 18:54)  T(F): 98 (26 Jun 2020 08:19), Max: 98.1 (25 Jun 2020 18:54)  HR: 77 (26 Jun 2020 10:00) (76 - 98)  BP: 97/57 (26 Jun 2020 08:19) (97/57 - 136/86)  BP(mean): --  RR: 18 (26 Jun 2020 08:19) (17 - 18)  SpO2: 99% (26 Jun 2020 13:15) (85% - 100%)    ________________________________________________  PHYSICAL EXAM:  GENERAL: NAD  HEENT: Normocephalic;  conjunctivae and sclerae clear; moist mucous membranes;   NECK : supple  CHEST/LUNG: Clear to auscultation bilaterally with good air entry   HEART: S1 S2  regular; no murmurs, gallops or rubs  ABDOMEN: Soft, Nontender, Nondistended; Bowel sounds present  EXTREMITIES: BLE edema L > R   SKIN: warm and dry; no rash  NERVOUS SYSTEM:  Awake and alert; Oriented  to place, person and time ; no new deficits    _________________________________________________  LABS:                        13.1   9.64  )-----------( 176      ( 26 Jun 2020 06:34 )             42.0     06-26    129<L>  |  90<L>  |  54<H>  ----------------------------<  99  4.5   |  26  |  8.56<H>    Ca    8.9      26 Jun 2020 06:34  Phos  8.5     06-25  Mg     1.9     06-25    TPro  7.4  /  Alb  3.1<L>  /  TBili  0.7  /  DBili  x   /  AST  46<H>  /  ALT  54  /  AlkPhos  88  06-25    PT/INR - ( 26 Jun 2020 06:34 )   PT: 50.1 sec;   INR: 4.24 ratio         PTT - ( 25 Jun 2020 14:59 )  PTT:41.2 sec    CAPILLARY BLOOD GLUCOSE      POCT Blood Glucose.: 210 mg/dL (26 Jun 2020 11:30)  POCT Blood Glucose.: 103 mg/dL (26 Jun 2020 08:16)  POCT Blood Glucose.: 136 mg/dL (25 Jun 2020 21:06)  POCT Blood Glucose.: 119 mg/dL (25 Jun 2020 18:45)    RADIOLOGY & ADDITIONAL TESTS:    Imaging  Reviewed:  YES    Consultant(s) Notes Reviewed:   YES      Plan of care was discussed with patient and /or primary care giver; all questions and concerns were addressed

## 2020-06-26 NOTE — PROGRESS NOTE ADULT - PROBLEM SELECTOR PLAN 8
Needs negative covid for acceptance to HD - sent 6/26   Likely will need home 02  Case management and social work following DVT ppx - on coumadin, INR supratherapeutic 4.24   GI ppx - protonix

## 2020-06-26 NOTE — PROGRESS NOTE ADULT - PROBLEM SELECTOR PLAN 5
Not in exacerbation   EF 40-45%  Moderate global left ventricular systolic dysfunction, decreased RV systolic function.   Moderate pulmonary hypertension  Stress with fixed defect, no new perfusion abnormalities.   Cardiology Dr. Colin

## 2020-06-26 NOTE — PROGRESS NOTE ADULT - ATTENDING COMMENTS
PATIENT IS SEEN AND EXAMINED    -  CELLULITIS OF LEFT LEG, DIABETIC LEFT FOOT ULCER  - IMPROVED ON VANCOMYCIN [PER ID RECOMMENDATION, SWITCHED FROM UNASYN+DOXY]. BLOOD  CXS NGTD. ID F/UP BY DR. DUEÑAS    - HYPOXIA ON ROOM AIR - MAY BE RELATED TO PULMONARY HYPERTENSION - F/U CXR AND ARRANGE FOR HOME O2  -  ESRD ON HD - NEPHROLOGY CONSULT IN PROGRESS  - DIASTOLIC CHF - TTE W/ EVIDENCE OF RIGHT/LEFT SYSTOLIC DYSFXN, PULMONARY HTN, MR, STRESS TEST TODAY WITHOUT NEW PERFUSION ABNORMALITIES; CARDIOLOGY CONSULT IN PROGRESS  - A/FIB ON COUMADIN, W/ SUPRATHERAPEUTIC INR - HOLD COUMADIN TODAY  -  PAD, DIABETIC PERIPHERAL NEUROPATHY - PAIN MGMT CONSULT IN PROGRESS  - CASE DISCUSSED EXTENSIVELY WITH SON [SYLVAIN RENTERIA ] AND PATIENT  -  GI AND DVT PROPHYLAXIS    DONALD DIAZ M.D. COVERING EPHRAIM DIAZ M.D.

## 2020-06-26 NOTE — PROGRESS NOTE ADULT - PROBLEM SELECTOR PLAN 9
Needs negative covid for acceptance to HD - sent 6/26   Likely will need home 02  Case management and social work following

## 2020-06-26 NOTE — PROGRESS NOTE ADULT - PROBLEM SELECTOR PLAN 4
Continue methimazole Aflutter on EKG   INR supratherapeutic, coumadin held tonight   Follow INR in AM

## 2020-06-27 LAB
ANION GAP SERPL CALC-SCNC: 14 MMOL/L — SIGNIFICANT CHANGE UP (ref 5–17)
APTT BLD: 37.8 SEC — HIGH (ref 27.5–36.3)
BUN SERPL-MCNC: 67 MG/DL — HIGH (ref 7–18)
CALCIUM SERPL-MCNC: 7.8 MG/DL — LOW (ref 8.4–10.5)
CHLORIDE SERPL-SCNC: 88 MMOL/L — LOW (ref 96–108)
CO2 SERPL-SCNC: 24 MMOL/L — SIGNIFICANT CHANGE UP (ref 22–31)
CREAT SERPL-MCNC: 10.2 MG/DL — HIGH (ref 0.5–1.3)
GLUCOSE BLDC GLUCOMTR-MCNC: 136 MG/DL — HIGH (ref 70–99)
GLUCOSE BLDC GLUCOMTR-MCNC: 147 MG/DL — HIGH (ref 70–99)
GLUCOSE BLDC GLUCOMTR-MCNC: 168 MG/DL — HIGH (ref 70–99)
GLUCOSE BLDC GLUCOMTR-MCNC: 173 MG/DL — HIGH (ref 70–99)
GLUCOSE BLDC GLUCOMTR-MCNC: 175 MG/DL — HIGH (ref 70–99)
GLUCOSE SERPL-MCNC: 208 MG/DL — HIGH (ref 70–99)
HCT VFR BLD CALC: 39.6 % — SIGNIFICANT CHANGE UP (ref 39–50)
HGB BLD-MCNC: 12.5 G/DL — LOW (ref 13–17)
INR BLD: 2.94 RATIO — HIGH (ref 0.88–1.16)
MCHC RBC-ENTMCNC: 28.9 PG — SIGNIFICANT CHANGE UP (ref 27–34)
MCHC RBC-ENTMCNC: 31.6 GM/DL — LOW (ref 32–36)
MCV RBC AUTO: 91.7 FL — SIGNIFICANT CHANGE UP (ref 80–100)
NRBC # BLD: 0 /100 WBCS — SIGNIFICANT CHANGE UP (ref 0–0)
PLATELET # BLD AUTO: 149 K/UL — LOW (ref 150–400)
POTASSIUM SERPL-MCNC: 4.4 MMOL/L — SIGNIFICANT CHANGE UP (ref 3.5–5.3)
POTASSIUM SERPL-SCNC: 4.4 MMOL/L — SIGNIFICANT CHANGE UP (ref 3.5–5.3)
PROTHROM AB SERPL-ACNC: 34.3 SEC — HIGH (ref 10–12.9)
RBC # BLD: 4.32 M/UL — SIGNIFICANT CHANGE UP (ref 4.2–5.8)
RBC # FLD: 15.4 % — HIGH (ref 10.3–14.5)
SODIUM SERPL-SCNC: 126 MMOL/L — LOW (ref 135–145)
WBC # BLD: 11.73 K/UL — HIGH (ref 3.8–10.5)
WBC # FLD AUTO: 11.73 K/UL — HIGH (ref 3.8–10.5)

## 2020-06-27 PROCEDURE — 71250 CT THORAX DX C-: CPT | Mod: 26

## 2020-06-27 RX ADMIN — PIPERACILLIN AND TAZOBACTAM 25 GRAM(S): 4; .5 INJECTION, POWDER, LYOPHILIZED, FOR SOLUTION INTRAVENOUS at 17:50

## 2020-06-27 RX ADMIN — Medication 667 MILLIGRAM(S): at 08:19

## 2020-06-27 RX ADMIN — LIDOCAINE 1 APPLICATION(S): 4 CREAM TOPICAL at 21:34

## 2020-06-27 RX ADMIN — Medication 1: at 17:49

## 2020-06-27 RX ADMIN — GABAPENTIN 100 MILLIGRAM(S): 400 CAPSULE ORAL at 17:52

## 2020-06-27 RX ADMIN — KETOTIFEN FUMARATE 1 DROP(S): 0.34 SOLUTION OPHTHALMIC at 06:29

## 2020-06-27 RX ADMIN — GABAPENTIN 100 MILLIGRAM(S): 400 CAPSULE ORAL at 06:13

## 2020-06-27 RX ADMIN — LIDOCAINE 1 APPLICATION(S): 4 CREAM TOPICAL at 13:59

## 2020-06-27 RX ADMIN — AMIODARONE HYDROCHLORIDE 200 MILLIGRAM(S): 400 TABLET ORAL at 06:18

## 2020-06-27 RX ADMIN — KETOTIFEN FUMARATE 1 DROP(S): 0.34 SOLUTION OPHTHALMIC at 17:50

## 2020-06-27 RX ADMIN — SENNA PLUS 2 TABLET(S): 8.6 TABLET ORAL at 21:34

## 2020-06-27 RX ADMIN — PANTOPRAZOLE SODIUM 40 MILLIGRAM(S): 20 TABLET, DELAYED RELEASE ORAL at 06:14

## 2020-06-27 RX ADMIN — TIOTROPIUM BROMIDE 1 CAPSULE(S): 18 CAPSULE ORAL; RESPIRATORY (INHALATION) at 13:58

## 2020-06-27 RX ADMIN — Medication 250 MILLIGRAM(S): at 10:01

## 2020-06-27 RX ADMIN — Medication 667 MILLIGRAM(S): at 17:49

## 2020-06-27 RX ADMIN — PIPERACILLIN AND TAZOBACTAM 25 GRAM(S): 4; .5 INJECTION, POWDER, LYOPHILIZED, FOR SOLUTION INTRAVENOUS at 06:16

## 2020-06-27 RX ADMIN — Medication 81 MILLIGRAM(S): at 13:58

## 2020-06-27 RX ADMIN — LIDOCAINE 1 APPLICATION(S): 4 CREAM TOPICAL at 06:18

## 2020-06-27 RX ADMIN — Medication 650 MILLIGRAM(S): at 06:13

## 2020-06-27 RX ADMIN — SIMVASTATIN 40 MILLIGRAM(S): 20 TABLET, FILM COATED ORAL at 21:34

## 2020-06-27 RX ADMIN — CHLORHEXIDINE GLUCONATE 1 APPLICATION(S): 213 SOLUTION TOPICAL at 14:00

## 2020-06-27 RX ADMIN — GLYCOPYRROLATE AND FORMOTEROL FUMARATE 2 PUFF(S): 9; 4.8 AEROSOL, METERED RESPIRATORY (INHALATION) at 21:35

## 2020-06-27 RX ADMIN — GLYCOPYRROLATE AND FORMOTEROL FUMARATE 2 PUFF(S): 9; 4.8 AEROSOL, METERED RESPIRATORY (INHALATION) at 10:26

## 2020-06-27 NOTE — CHART NOTE - NSCHARTNOTEFT_GEN_A_CORE
EVENT:  MD requested consults called.      HPI:  69M with CAD s/p multiple stents, s/p CABG (9/2019 course c/b Afib and LT pleural effusion), Diastolic CHF, HTN, HLD, DM2, Afib on coumadin and ESRD on HD ([place], Thurs, Sat at [facility]), hyperthyroidism, presented to ER for pain and swelling of left leg since 3 days. Patient reports sustaining injury to posterior aspect of left leg 1 month ago when he accidentally hit his leg against his bed. He reports the wound has not healed since. Reports he developed pain and swelling of left leg since 3 days, 9/10 in severity, with difficulty ambulating. Reports he was advised to get doppler which he reviewed with hic cardiologist today, was told he has no clot and recommended to come to ER. Denies fever, chills, weakness, cough, dysuria, NVDC. (18 Jun 2020 19:29)        OBJECTIVE:  Vital Signs Last 24 Hrs  T(C): 36.4 (27 Jun 2020 09:47), Max: 36.4 (26 Jun 2020 16:14)  T(F): 97.6 (27 Jun 2020 09:47), Max: 97.6 (27 Jun 2020 09:47)  HR: 75 (27 Jun 2020 09:47) (74 - 81)  BP: 101/63 (27 Jun 2020 09:47) (99/51 - 123/54)  BP(mean): --  RR: 20 (27 Jun 2020 07:40) (17 - 20)  SpO2: 100% (27 Jun 2020 09:47) (96% - 100%)    LABS:                        12.5   11.73 )-----------( 149      ( 27 Jun 2020 09:58 )             39.6     06-27    126<L>  |  88<L>  |  67<H>  ----------------------------<  208<H>  4.4   |  24  |  10.20<H>    Ca    7.8<L>      27 Jun 2020 09:58  Phos  8.5     06-25  Mg     1.9     06-25    TPro  7.4  /  Alb  3.1<L>  /  TBili  0.7  /  DBili  x   /  AST  46<H>  /  ALT  54  /  AlkPhos  88  06-25      ASSESSMENT:  < from: CT Chest No Cont (06.27.20 @ 09:21) >    IMPRESSION:     There is a large loculated left pleural effusion which is increased in size since the previous exam. There is associated compressive atelectasis.    < end of copied text >    Problem:  Dr. Delaney (Pulmonary) consulted.  Dr. Tineo (Surgery) consulted.      PLAN:  Per Dr. Delaney, patient will need chest tube.  Surgery notified. EVENT:  MD requested consults called.      HPI:  69M with CAD s/p multiple stents, s/p CABG (9/2019 course c/b Afib and LT pleural effusion), Diastolic CHF, HTN, HLD, DM2, Afib on coumadin and ESRD on HD ([place], Thurs, Sat at [facility]), hyperthyroidism, presented to ER for pain and swelling of left leg since 3 days. Patient reports sustaining injury to posterior aspect of left leg 1 month ago when he accidentally hit his leg against his bed. He reports the wound has not healed since. Reports he developed pain and swelling of left leg since 3 days, 9/10 in severity, with difficulty ambulating. Reports he was advised to get doppler which he reviewed with hic cardiologist today, was told he has no clot and recommended to come to ER. Denies fever, chills, weakness, cough, dysuria, NVDC. (18 Jun 2020 19:29)        OBJECTIVE:  Vital Signs Last 24 Hrs  T(C): 36.4 (27 Jun 2020 09:47), Max: 36.4 (26 Jun 2020 16:14)  T(F): 97.6 (27 Jun 2020 09:47), Max: 97.6 (27 Jun 2020 09:47)  HR: 75 (27 Jun 2020 09:47) (74 - 81)  BP: 101/63 (27 Jun 2020 09:47) (99/51 - 123/54)  BP(mean): --  RR: 20 (27 Jun 2020 07:40) (17 - 20)  SpO2: 100% (27 Jun 2020 09:47) (96% - 100%)    LABS:                        12.5   11.73 )-----------( 149      ( 27 Jun 2020 09:58 )             39.6     06-27    126<L>  |  88<L>  |  67<H>  ----------------------------<  208<H>  4.4   |  24  |  10.20<H>    Ca    7.8<L>      27 Jun 2020 09:58  Phos  8.5     06-25  Mg     1.9     06-25    TPro  7.4  /  Alb  3.1<L>  /  TBili  0.7  /  DBili  x   /  AST  46<H>  /  ALT  54  /  AlkPhos  88  06-25      ASSESSMENT:  < from: CT Chest No Cont (06.27.20 @ 09:21) >    IMPRESSION:     There is a large loculated left pleural effusion which is increased in size since the previous exam. There is associated compressive atelectasis.    < end of copied text >    Problem:  Dr. Delaney (Pulmonary) consulted.  Dr. Walters (Surgery) consulted.      PLAN:  Per Dr. Delaney, patient will need chest tube.  Surgery notified.

## 2020-06-27 NOTE — PROGRESS NOTE ADULT - ASSESSMENT
Left leg cellulitis  Leukocytosis    Plan - Cont Vancomycin 1 gm iv  intradialysis on T-T-S till 7/3/20  if discharged he can complete his abxs at his outpatient  dialysis center.  reconsult prn.

## 2020-06-27 NOTE — PROGRESS NOTE ADULT - SUBJECTIVE AND OBJECTIVE BOX
Patient is a 69y Male with  ESRD ON  HD    JUST  FINISHED  HD, TOLERATED I T WELL    NO  ACUTE  EVENTS OVERNIGHT      lisinopril (Other)    Hospital Medications:   MEDICATIONS  (STANDING):  aMIOdarone    Tablet 200 milliGRAM(s) Oral daily  aspirin enteric coated 81 milliGRAM(s) Oral daily  calcium acetate 667 milliGRAM(s) Oral three times a day with meals  chlorhexidine 2% Cloths 1 Application(s) Topical daily  dextrose 5%. 1000 milliLiter(s) (50 mL/Hr) IV Continuous <Continuous>  gabapentin 100 milliGRAM(s) Oral two times a day  glycopyrrolate 9 MICROgram(s)/formoterol 4.8 MICROgram(s) Inhaler 2 Puff(s) Inhalation two times a day  insulin lispro (HumaLOG) corrective regimen sliding scale   SubCutaneous three times a day before meals  insulin lispro (HumaLOG) corrective regimen sliding scale   SubCutaneous at bedtime  ketotifen 0.025% Ophthalmic Solution 1 Drop(s) Both EYES two times a day  lidocaine 2% Gel 1 Application(s) Topical every 8 hours  methimazole 5 milliGRAM(s) Oral daily  metoprolol tartrate 100 milliGRAM(s) Oral two times a day  pantoprazole    Tablet 40 milliGRAM(s) Oral before breakfast  piperacillin/tazobactam IVPB.. 3.375 Gram(s) IV Intermittent every 12 hours  senna 2 Tablet(s) Oral at bedtime  simvastatin 40 milliGRAM(s) Oral at bedtime  tiotropium 18 MICROgram(s) Capsule 1 Capsule(s) Inhalation daily  vancomycin  IVPB 1000 milliGRAM(s) IV Intermittent <User Schedule>    REVIEW OF SYSTEMS:  HAS NO   FEVER  CHILLS   HAS  SOB  ON  MINIMAL  EXERTION   NO CH  PAIN  / PALPITATIONS   APPETITE IS  OK, NO  N/V  OR  ABD  PAIN   NO LEG  SWELLING, NO  REDNESS  SEEN      VITALS:  T(F): 97.6 (06-27-20 @ 09:47), Max: 97.6 (06-27-20 @ 09:47)  HR: 75 (06-27-20 @ 09:47)  BP: 101/63 (06-27-20 @ 09:47)  RR: 20 (06-27-20 @ 07:40)  SpO2: 100% (06-27-20 @ 09:47)  Wt(kg): --    06-27 @ 07:01  -  06-27 @ 13:57  --------------------------------------------------------  IN: 0 mL / OUT: 3000 mL / NET: -3000 mL        PHYSICAL EXAM:  Constitutional: NAD  HEENT: CONJ  PINK  Neck: No JVD  Respiratory: DECREASED  BR  SOUND L  LUNG  Cardiovascular: S1, S2, RRR  Gastrointestinal: BS+, soft, NT/ND  Extremities:  No peripheral edema  HAS  SOME  DISCOLORATION  L  GRADY  Neurological: A/O x 2  :  No wagner.     Vascular Access: AVG  L  UPPER  ARM    LABS:  06-27    126<L>  |  88<L>  |  67<H>  ----------------------------<  208<H>  4.4   |  24  |  10.20<H>    Ca    7.8<L>      27 Jun 2020 09:58  Phos  8.5     06-25  Mg     1.9     06-25    TPro  7.4  /  Alb  3.1<L>  /  TBili  0.7  /  DBili      /  AST  46<H>  /  ALT  54  /  AlkPhos  88  06-25    Creatinine Trend: 10.20 <--, 8.56 <--, 10.60 <--, 8.82 <--, 10.60 <--, 9.06 <--, 6.66 <--                        12.5   11.73 )-----------( 149      ( 27 Jun 2020 09:58 )             39.6     Urine Studies:      RADIOLOGY & ADDITIONAL STUDIES:

## 2020-06-27 NOTE — PROGRESS NOTE ADULT - ASSESSMENT
A/P    ESRD  ON  HD  ,  FOR  REGULAR HD  ON  TUES   HB  OK ,  OFF  ROSARIO  WITH HD   ON  VANCO  WITH  HD  AS  PER ID  FOR  CELLULITIS  LOWER EXT   L  LOCULATED PL  EFFUSION, SEE BY  THORACIC,  NEEDS  CH  TUBE  PLACEMENT    BP   IS  ACCEPTABLE   VOL STATUS IS  GOOD  WILL  FOLLOW

## 2020-06-27 NOTE — PROGRESS NOTE ADULT - SUBJECTIVE AND OBJECTIVE BOX
69y Male    Meds:  piperacillin/tazobactam IVPB.. 3.375 Gram(s) IV Intermittent every 12 hours  vancomycin  IVPB 1000 milliGRAM(s) IV Intermittent <User Schedule>    Allergies    lisinopril (Other)    Intolerances        VITALS:  Vital Signs Last 24 Hrs  T(C): 36.7 (27 Jun 2020 15:37), Max: 36.7 (27 Jun 2020 15:37)  T(F): 98 (27 Jun 2020 15:37), Max: 98 (27 Jun 2020 15:37)  HR: 64 (27 Jun 2020 15:37) (62 - 81)  BP: 108/58 (27 Jun 2020 15:37) (99/51 - 123/54)  BP(mean): --  RR: 20 (27 Jun 2020 15:37) (17 - 20)  SpO2: 100% (27 Jun 2020 15:37) (96% - 100%)    LABS/DIAGNOSTIC TESTS:                          12.5   11.73 )-----------( 149      ( 27 Jun 2020 09:58 )             39.6         06-27    126<L>  |  88<L>  |  67<H>  ----------------------------<  208<H>  4.4   |  24  |  10.20<H>    Ca    7.8<L>      27 Jun 2020 09:58            CULTURES: .Blood Blood  06-18 @ 22:15   No Growth Final  --  --            RADIOLOGY:      ROS:  [  ] UNABLE TO ELICIT 69y Male who is doing better clinically, he has no fevers or chills, he was SOB and found to have a loculated pleural effusion which is going to be tapped on Monday, he was put on Zosyn empirically and got a CT chest which shows atelectasis but no infiltrate. He has less left leg pain and less swelling.    Meds:  piperacillin/tazobactam IVPB.. 3.375 Gram(s) IV Intermittent every 12 hours  vancomycin  IVPB 1000 milliGRAM(s) IV Intermittent <User Schedule>    Allergies    lisinopril (Other)    Intolerances        VITALS:  Vital Signs Last 24 Hrs  T(C): 36.7 (27 Jun 2020 15:37), Max: 36.7 (27 Jun 2020 15:37)  T(F): 98 (27 Jun 2020 15:37), Max: 98 (27 Jun 2020 15:37)  HR: 64 (27 Jun 2020 15:37) (62 - 81)  BP: 108/58 (27 Jun 2020 15:37) (99/51 - 123/54)  BP(mean): --  RR: 20 (27 Jun 2020 15:37) (17 - 20)  SpO2: 100% (27 Jun 2020 15:37) (96% - 100%)    LABS/DIAGNOSTIC TESTS:                          12.5   11.73 )-----------( 149      ( 27 Jun 2020 09:58 )             39.6         06-27    126<L>  |  88<L>  |  67<H>  ----------------------------<  208<H>  4.4   |  24  |  10.20<H>    Ca    7.8<L>      27 Jun 2020 09:58            CULTURES: .Blood Blood  06-18 @ 22:15   No Growth Final  --  --            RADIOLOGY:      ROS:  [  ] UNABLE TO ELICIT

## 2020-06-27 NOTE — CONSULT NOTE ADULT - SUBJECTIVE AND OBJECTIVE BOX
HPI:  69M with CAD s/p multiple stents, s/p CABG (9/2019 course c/b Afib and LT pleural effusion), Diastolic CHF, HTN, HLD, DM2, Afib on coumadin and ESRD on HD (Tu, Thurs, Sat at [facility]), hyperthyroidism, presented to ER for pain and swelling of left leg since 3 days. Patient reports sustaining injury to posterior aspect of left leg 1 month ago when he accidentally hit his leg against his bed. He reports the wound has not healed since. Reports he developed pain and swelling of left leg since 3 days, 9/10 in severity, with difficulty ambulating. Reports he was advised to get doppler which he reviewed with [facility] cardiologist today, was told he has no clot and recommended to come to ER. Denies fever, chills, weakness, cough, dysuria, NVDC. (18 Jun 2020 19:29)    Thoracic surgery consulted for increased pleural effusion  CT scan IMPRESSION:     There is a large loculated left pleural effusion which is increased in size since the previous exam. There is associated compressive atelectasis.    Pt was on coumadin therapy with an INR 6.85 on 6/25  currently 2.94    Pt at HD sats 100% on 2L NC  no SOB  resting comfortably  b/l air entry  dec L vs R    PAST MEDICAL & SURGICAL HISTORY:  ESRD (end stage renal disease) on dialysis: T-Th-Sat  Stented coronary artery: total 15 stents from 9346-2518  Hyperthyroidism  H/O: CVA: after cardiac stent placed 12/15/09 -no residual  Heart Attack: 2/1/07 with subsequent stent  Coronary Stent: [id number] 10 stents,  to Ramus 1/2015, cath 01/2016 ( see results in HPI)  Hypercholesterolemia  CAD (Coronary Artery Disease)  DM (Diabetes Mellitus): x 4 yrs without N/N/R  HTN (Hypertension)  S/P CABG x 4  Hemodialysis access, AV graft: 5/2015, L arm  S/P cataract extraction  Boil of Buttock: 2006 and 2008      Vital Signs Last 24 Hrs  T(C): 36.4 (27 Jun 2020 09:47), Max: 36.4 (26 Jun 2020 16:14)  T(F): 97.6 (27 Jun 2020 09:47), Max: 97.6 (27 Jun 2020 09:47)  HR: 75 (27 Jun 2020 09:47) (74 - 81)  BP: 101/63 (27 Jun 2020 09:47) (99/51 - 123/54)  BP(mean): --  RR: 20 (27 Jun 2020 07:40) (17 - 20)  SpO2: 100% (27 Jun 2020 09:47) (96% - 100%)                          12.5   11.73 )-----------( 149      ( 27 Jun 2020 09:58 )             39.6     06-27    126<L>  |  88<L>  |  67<H>  ----------------------------<  208<H>  4.4   |  24  |  10.20<H>    Ca    7.8<L>      27 Jun 2020 09:58  Phos  8.5     06-25  Mg     1.9     06-25    TPro  7.4  /  Alb  3.1<L>  /  TBili  0.7  /  DBili  x   /  AST  46<H>  /  ALT  54  /  AlkPhos  88  06-25    PT/INR - ( 27 Jun 2020 09:58 )   PT: 34.3 sec;   INR: 2.94 ratio         PTT - ( 27 Jun 2020 09:58 )  PTT:37.8 sec      ASSESSMENT/ PLAN:  68 y/o male with multiple med/cardiac comorbidities  thoracic consulted for L pleural effusion  INR still in therapeutic range (2.94) as was 6.85 on 6/25  currently without SOB, sats in high 90s on nc  cont HD as per renal  trend INR as coumadin on hold  no emergent drainage at this time as pt high risk for bleeding  will follow for poss CT drainage  case discussed with Dr Montano on call thoracic  f/u pulmonary recs

## 2020-06-27 NOTE — PROGRESS NOTE ADULT - SUBJECTIVE AND OBJECTIVE BOX
Patient denies CP, SOB Review of systems otherwise (-)    acetaminophen   Tablet .. 650 milliGRAM(s) Oral every 6 hours PRN  ALBUTerol    90 MICROgram(s) HFA Inhaler 2 Puff(s) Inhalation every 6 hours PRN  aMIOdarone    Tablet 200 milliGRAM(s) Oral daily  aspirin enteric coated 81 milliGRAM(s) Oral daily  calcium acetate 667 milliGRAM(s) Oral three times a day with meals  chlorhexidine 2% Cloths 1 Application(s) Topical daily  dextrose 5%. 1000 milliLiter(s) IV Continuous <Continuous>  gabapentin 100 milliGRAM(s) Oral two times a day  glycopyrrolate 9 MICROgram(s)/formoterol 4.8 MICROgram(s) Inhaler 2 Puff(s) Inhalation two times a day  insulin lispro (HumaLOG) corrective regimen sliding scale   SubCutaneous three times a day before meals  insulin lispro (HumaLOG) corrective regimen sliding scale   SubCutaneous at bedtime  ketotifen 0.025% Ophthalmic Solution 1 Drop(s) Both EYES two times a day  lidocaine 2% Gel 1 Application(s) Topical every 8 hours  methimazole 5 milliGRAM(s) Oral daily  metoprolol tartrate 100 milliGRAM(s) Oral two times a day  pantoprazole    Tablet 40 milliGRAM(s) Oral before breakfast  piperacillin/tazobactam IVPB.. 3.375 Gram(s) IV Intermittent every 12 hours  senna 2 Tablet(s) Oral at bedtime  simvastatin 40 milliGRAM(s) Oral at bedtime  tiotropium 18 MICROgram(s) Capsule 1 Capsule(s) Inhalation daily  vancomycin  IVPB 1000 milliGRAM(s) IV Intermittent <User Schedule>                            12.5   11.73 )-----------( 149      ( 27 Jun 2020 09:58 )             39.6       Hemoglobin: 12.5 g/dL (06-27 @ 09:58)  Hemoglobin: 13.1 g/dL (06-26 @ 06:34)  Hemoglobin: 13.9 g/dL (06-25 @ 14:59)  Hemoglobin: 13.4 g/dL (06-24 @ 06:46)  Hemoglobin: 13.4 g/dL (06-23 @ 07:31)      06-27    126<L>  |  88<L>  |  67<H>  ----------------------------<  208<H>  4.4   |  24  |  10.20<H>    Ca    7.8<L>      27 Jun 2020 09:58      Creatinine Trend: 10.20<--, 8.56<--, 10.60<--, 8.82<--, 10.60<--, 9.06<--    COAGS: PT/INR - ( 27 Jun 2020 09:58 )   PT: 34.3 sec;   INR: 2.94 ratio         PTT - ( 27 Jun 2020 09:58 )  PTT:37.8 sec          T(C): 36.7 (06-27-20 @ 15:37), Max: 36.7 (06-27-20 @ 15:37)  HR: 64 (06-27-20 @ 15:37) (62 - 81)  BP: 108/58 (06-27-20 @ 15:37) (99/51 - 123/54)  RR: 20 (06-27-20 @ 15:37) (17 - 20)  SpO2: 100% (06-27-20 @ 15:37) (96% - 100%)  Wt(kg): --    I&O's Summary    27 Jun 2020 07:01  -  27 Jun 2020 17:54  --------------------------------------------------------  IN: 0 mL / OUT: 3000 mL / NET: -3000 mL      Gen: Appears well in NAD  HEENT:  (-)icterus (-)pallor  CV: N S1 S2 1/6 DEJAH (+)2 Pulses B/l  Resp:  Clear to ausculatation B/L, normal effort  GI: (+) BS Soft, NT, ND  Lymph:  (+) minimal Edema, (-)obvious lymphadenopathy  Skin: Warm to touch, Normal turgor  Psych: Appropriate mood and affect      ASSESSMENT/PLAN: 69y Male  CAD s/p multiple stents, s/p CABG (9/2019 course c/b Afib and LT pleural effusion), Normal LV systolic function, severe Diastolic, RV dysfx CHF, HTN, HLD, DM2, Afib on coumadin and ESRD on HD (Tu, Thurs, Sat at [facility]), hyperthyroidism, presented to ER for pain and swelling of left leg since 3 days.    - LE dopplers negative for dVT   - Continue with coumadin for goal INR 2-3    - continue with fluid removal with HD  - HD per renal  - Echo with moderately reduced LV function, not significantly different than time of Bypass.  Stress with no new perfusion abnormalities.   - No need for further inpatient cardiac work up.  - d/c plan per med      Murphy Colin MD, Located within [facility]  BEEPER [phone]

## 2020-06-27 NOTE — PROGRESS NOTE ADULT - SUBJECTIVE AND OBJECTIVE BOX
Patient is a 69y old  Male who presents with a chief complaint of leg pain (2020 14:36)    PATIENT IS SEEN AND EXAMINED IN MEDICAL FLOOR.    ALLERGIES:  lisinopril (Other)    Daily     Daily Weight in k (2020 12:54)    VITALS:    Vital Signs Last 24 Hrs  T(C): 36.3 (2020 13:59), Max: 36.5 (2020 12:54)  T(F): 97.4 (2020 13:59), Max: 97.7 (2020 12:54)  HR: 62 (2020 13:59) (62 - 81)  BP: 117/57 (2020 13:59) (99/51 - 123/54)  BP(mean): --  RR: 20 (2020 13:59) (17 - 20)  SpO2: 100% (2020 13:59) (96% - 100%)    LABS:    CBC Full  -  ( 2020 09:58 )  WBC Count : 11.73 K/uL  RBC Count : 4.32 M/uL  Hemoglobin : 12.5 g/dL  Hematocrit : 39.6 %  Platelet Count - Automated : 149 K/uL  Mean Cell Volume : 91.7 fl  Mean Cell Hemoglobin : 28.9 pg  Mean Cell Hemoglobin Concentration : 31.6 gm/dL  Auto Neutrophil # : x  Auto Lymphocyte # : x  Auto Monocyte # : x  Auto Eosinophil # : x  Auto Basophil # : x  Auto Neutrophil % : x  Auto Lymphocyte % : x  Auto Monocyte % : x  Auto Eosinophil % : x  Auto Basophil % : x    PT/INR - ( 2020 09:58 )   PT: 34.3 sec;   INR: 2.94 ratio         PTT - ( 2020 09:58 )  PTT:37.8 sec      126<L>  |  88<L>  |  67<H>  ----------------------------<  208<H>  4.4   |  24  |  10.20<H>    Ca    7.8<L>      2020 09:58      CAPILLARY BLOOD GLUCOSE      POCT Blood Glucose.: 147 mg/dL (2020 12:29)  POCT Blood Glucose.: 136 mg/dL (2020 08:10)  POCT Blood Glucose.: 175 mg/dL (2020 03:55)  POCT Blood Glucose.: 129 mg/dL (2020 20:22)  POCT Blood Glucose.: 132 mg/dL (2020 18:42)  POCT Blood Glucose.: 92 mg/dL (2020 18:05)  POCT Blood Glucose.: 77 mg/dL (2020 17:20)  POCT Blood Glucose.: 52 mg/dL (2020 16:54)          Creatinine Trend: 10.20<--, 8.56<--, 10.60<--, 8.82<--, 10.60<--, 9.06<--  I&O's Summary    2020 07:01  -  2020 15:38  --------------------------------------------------------  IN: 0 mL / OUT: 3000 mL / NET: -3000 mL      .Blood Blood  -18 @ 22:15   No Growth Final  --  --    MEDICATIONS:    MEDICATIONS  (STANDING):  aMIOdarone    Tablet 200 milliGRAM(s) Oral daily  aspirin enteric coated 81 milliGRAM(s) Oral daily  calcium acetate 667 milliGRAM(s) Oral three times a day with meals  chlorhexidine 2% Cloths 1 Application(s) Topical daily  dextrose 5%. 1000 milliLiter(s) (50 mL/Hr) IV Continuous <Continuous>  gabapentin 100 milliGRAM(s) Oral two times a day  glycopyrrolate 9 MICROgram(s)/formoterol 4.8 MICROgram(s) Inhaler 2 Puff(s) Inhalation two times a day  insulin lispro (HumaLOG) corrective regimen sliding scale   SubCutaneous three times a day before meals  insulin lispro (HumaLOG) corrective regimen sliding scale   SubCutaneous at bedtime  ketotifen 0.025% Ophthalmic Solution 1 Drop(s) Both EYES two times a day  lidocaine 2% Gel 1 Application(s) Topical every 8 hours  methimazole 5 milliGRAM(s) Oral daily  metoprolol tartrate 100 milliGRAM(s) Oral two times a day  pantoprazole    Tablet 40 milliGRAM(s) Oral before breakfast  piperacillin/tazobactam IVPB.. 3.375 Gram(s) IV Intermittent every 12 hours  senna 2 Tablet(s) Oral at bedtime  simvastatin 40 milliGRAM(s) Oral at bedtime  tiotropium 18 MICROgram(s) Capsule 1 Capsule(s) Inhalation daily  vancomycin  IVPB 1000 milliGRAM(s) IV Intermittent <User Schedule>      MEDICATIONS  (PRN):  acetaminophen   Tablet .. 650 milliGRAM(s) Oral every 6 hours PRN Moderate Pain (4 - 6)  ALBUTerol    90 MICROgram(s) HFA Inhaler 2 Puff(s) Inhalation every 6 hours PRN Shortness of Breath and/or Wheezing      REVIEW OF SYSTEMS:                           ALL ROS DONE [ X   ]    CONSTITUTIONAL:  LETHARGIC [   ], FEVER [   ], UNRESPONSIVE [   ]  CVS:  CP  [   ], SOB, [   ], PALPITATIONS [   ], DIZZYNESS [   ]  RS: COUGH [   ], SPUTUM [   ]  GI: ABDOMINAL PAIN [   ], NAUSEA [   ], VOMITINGS [   ], DIARRHEA [   ], CONSTIPATION [   ]  :  DYSURIA [   ], NOCTURIA [   ], INCREASED FREQUENCY [   ], DRIBLING [   ],  SKELETAL: PAINFUL JOINTS [   ], SWOLLEN JOINTS [   ], NECK ACHE [   ], LOW BACK ACHE [   ],  SKIN : ULCERS [   ], RASH [   ], ITCHING [   ]  CNS: HEAD ACHE [   ], DOUBLE VISION [   ], BLURRED VISION [   ], AMS / CONFUSION [   ], SEIZURES [   ], WEAKNESS [   ],TINGLING / NUMBNESS [   ]    PHYSICAL EXAMINATION:    GENERAL APPEARANCE: NO DISTRESS  HEENT:  NO PALLOR, NO  JVD,  NO   NODES, NECK SUPPLE  CVS: S1 +, S2 +,   RS: AEEB,  OCCASIONAL  RALES +,   NO RONCHI  ABD: SOFT, NT, NO, BS +  EXT: PE +, ERYTHEMA WITH INDURATION OF LEFT LEG +  SKIN: WARM,   SKELETAL:  ROM ACCEPTABLE  CNS:  AAO X  3  , NO  DEFICITS      RADIOLOGY :    < from: US Duplex Venous Lower Ext Complete, Bilateral (20 @ 11:32) >  IMPRESSION:   No evidence of deep venous thrombosis in either lower extremity.    < end of copied text >    < from: Xray Chest 1 View-PORTABLE IMMEDIATE (20 @ 17:48) >  IMPRESSION: Unchanged chest as above.    < end of copied text >    < from: CT Chest No Cont (20 @ 09:21) >  IMPRESSION:     There is a large loculated left pleural effusion which is increased in size since the previous exam. There is associated compressive atelectasis.    < end of copied text >        ASSESSMENT :     Cellulitis  ESRD (end stage renal disease) on dialysis  Stented coronary artery  Hyperthyroidism  CKD (chronic kidney disease)  H/O: CVA  Heart Attack  Coronary Stent  Hypercholesterolemia  CAD (Coronary Artery Disease)  DM (Diabetes Mellitus)  HTN (Hypertension)  S/P CABG x 4  Hemodialysis access, AV graft  S/P cataract extraction  Boil of Buttock      PLAN:  HPI:  69M with CAD s/p multiple stents, s/p CABG (2019 course c/b Afib and LT pleural effusion), Diastolic CHF, HTN, HLD, DM2, Afib on coumadin and ESRD on HD (, Thurs, Sat at [facility]), hyperthyroidism, presented to ER for pain and swelling of left leg since 3 days. Patient reports sustaining injury to posterior aspect of left leg 1 month ago when he accidentally hit his leg against his bed. He reports the wound has not healed since. Reports he developed pain and swelling of left leg since 3 days, 9/10 in severity, with difficulty ambulating. Reports he was advised to get doppler which he reviewed with [facility] cardiologist today, was told he has no clot and recommended to come to ER. Denies fever, chills, weakness, cough, dysuria, NVDC. (2020 19:29)    -  CELLULITIS OF LEFT LEG, DIABETIC LEFT FOOT ULCER  - IMPROVED ON VANCOMYCIN [PER ID RECOMMENDATION, SWITCHED FROM UNASYN+DOXY]. BLOOD  CXS NGTD. ID F/UP BY DR. DUEÑAS    - LOCULATED LEFT SIDED PLEURAL EFFUSION - PULMONOLOGY CONSULT PLACED. SURGERY CONSULT PLACED. PLAN IS FOR LIKELY DRAINAGE TOMORROW. ON VANCOMYCIN  -  ESRD ON HD - NEPHROLOGY CONSULT IN PROGRESS  - DIASTOLIC CHF - TTE W/ EVIDENCE OF RIGHT/LEFT SYSTOLIC DYSFXN, PULMONARY HTN, MR, STRESS TEST TODAY WITHOUT NEW PERFUSION ABNORMALITIES; CARDIOLOGY CONSULT IN PROGRESS  - A/FIB ON COUMADIN - HOLD COUMADIN TODAY IN ANTICIPATION OF PROCEDURE TOMORROW  -  PAD, DIABETIC PERIPHERAL NEUROPATHY - PAIN MGMT CONSULT IN PROGRESS  - CASE DISCUSSED EXTENSIVELY WITH SON [SYLVAIN RENTERIA ] AND PATIENT  -  GI AND DVT PROPHYLAXIS    DONALD DIAZ M.D. COVERING EPHRAIM DIAZ M.D.

## 2020-06-28 LAB
ANION GAP SERPL CALC-SCNC: 13 MMOL/L — SIGNIFICANT CHANGE UP (ref 5–17)
APTT BLD: 37.2 SEC — HIGH (ref 27.5–36.3)
BUN SERPL-MCNC: 46 MG/DL — HIGH (ref 7–18)
CALCIUM SERPL-MCNC: 8.5 MG/DL — SIGNIFICANT CHANGE UP (ref 8.4–10.5)
CHLORIDE SERPL-SCNC: 92 MMOL/L — LOW (ref 96–108)
CO2 SERPL-SCNC: 27 MMOL/L — SIGNIFICANT CHANGE UP (ref 22–31)
CREAT SERPL-MCNC: 8.35 MG/DL — HIGH (ref 0.5–1.3)
GLUCOSE BLDC GLUCOMTR-MCNC: 134 MG/DL — HIGH (ref 70–99)
GLUCOSE BLDC GLUCOMTR-MCNC: 143 MG/DL — HIGH (ref 70–99)
GLUCOSE BLDC GLUCOMTR-MCNC: 217 MG/DL — HIGH (ref 70–99)
GLUCOSE BLDC GLUCOMTR-MCNC: 96 MG/DL — SIGNIFICANT CHANGE UP (ref 70–99)
GLUCOSE SERPL-MCNC: 101 MG/DL — HIGH (ref 70–99)
HCT VFR BLD CALC: 40.7 % — SIGNIFICANT CHANGE UP (ref 39–50)
HGB BLD-MCNC: 12.7 G/DL — LOW (ref 13–17)
INR BLD: 2.14 RATIO — HIGH (ref 0.88–1.16)
MCHC RBC-ENTMCNC: 28.9 PG — SIGNIFICANT CHANGE UP (ref 27–34)
MCHC RBC-ENTMCNC: 31.2 GM/DL — LOW (ref 32–36)
MCV RBC AUTO: 92.5 FL — SIGNIFICANT CHANGE UP (ref 80–100)
NRBC # BLD: 0 /100 WBCS — SIGNIFICANT CHANGE UP (ref 0–0)
PLATELET # BLD AUTO: 118 K/UL — LOW (ref 150–400)
POTASSIUM SERPL-MCNC: 4.1 MMOL/L — SIGNIFICANT CHANGE UP (ref 3.5–5.3)
POTASSIUM SERPL-SCNC: 4.1 MMOL/L — SIGNIFICANT CHANGE UP (ref 3.5–5.3)
PROTHROM AB SERPL-ACNC: 24.7 SEC — HIGH (ref 10–12.9)
RBC # BLD: 4.4 M/UL — SIGNIFICANT CHANGE UP (ref 4.2–5.8)
RBC # FLD: 15.4 % — HIGH (ref 10.3–14.5)
SODIUM SERPL-SCNC: 132 MMOL/L — LOW (ref 135–145)
WBC # BLD: 9.53 K/UL — SIGNIFICANT CHANGE UP (ref 3.8–10.5)
WBC # FLD AUTO: 9.53 K/UL — SIGNIFICANT CHANGE UP (ref 3.8–10.5)

## 2020-06-28 RX ADMIN — Medication 100 MILLIGRAM(S): at 17:40

## 2020-06-28 RX ADMIN — SIMVASTATIN 40 MILLIGRAM(S): 20 TABLET, FILM COATED ORAL at 21:35

## 2020-06-28 RX ADMIN — Medication 650 MILLIGRAM(S): at 21:35

## 2020-06-28 RX ADMIN — KETOTIFEN FUMARATE 1 DROP(S): 0.34 SOLUTION OPHTHALMIC at 05:32

## 2020-06-28 RX ADMIN — Medication 81 MILLIGRAM(S): at 12:05

## 2020-06-28 RX ADMIN — TIOTROPIUM BROMIDE 1 CAPSULE(S): 18 CAPSULE ORAL; RESPIRATORY (INHALATION) at 12:04

## 2020-06-28 RX ADMIN — Medication 650 MILLIGRAM(S): at 02:22

## 2020-06-28 RX ADMIN — SENNA PLUS 2 TABLET(S): 8.6 TABLET ORAL at 21:35

## 2020-06-28 RX ADMIN — KETOTIFEN FUMARATE 1 DROP(S): 0.34 SOLUTION OPHTHALMIC at 17:40

## 2020-06-28 RX ADMIN — Medication 2: at 12:05

## 2020-06-28 RX ADMIN — Medication 667 MILLIGRAM(S): at 12:05

## 2020-06-28 RX ADMIN — PANTOPRAZOLE SODIUM 40 MILLIGRAM(S): 20 TABLET, DELAYED RELEASE ORAL at 05:32

## 2020-06-28 RX ADMIN — GABAPENTIN 100 MILLIGRAM(S): 400 CAPSULE ORAL at 05:32

## 2020-06-28 RX ADMIN — GLYCOPYRROLATE AND FORMOTEROL FUMARATE 2 PUFF(S): 9; 4.8 AEROSOL, METERED RESPIRATORY (INHALATION) at 08:40

## 2020-06-28 RX ADMIN — Medication 667 MILLIGRAM(S): at 17:40

## 2020-06-28 RX ADMIN — AMIODARONE HYDROCHLORIDE 200 MILLIGRAM(S): 400 TABLET ORAL at 05:32

## 2020-06-28 RX ADMIN — GLYCOPYRROLATE AND FORMOTEROL FUMARATE 2 PUFF(S): 9; 4.8 AEROSOL, METERED RESPIRATORY (INHALATION) at 21:35

## 2020-06-28 RX ADMIN — Medication 0: at 21:44

## 2020-06-28 RX ADMIN — Medication 667 MILLIGRAM(S): at 08:39

## 2020-06-28 RX ADMIN — LIDOCAINE 1 APPLICATION(S): 4 CREAM TOPICAL at 13:36

## 2020-06-28 RX ADMIN — LIDOCAINE 1 APPLICATION(S): 4 CREAM TOPICAL at 05:32

## 2020-06-28 RX ADMIN — GABAPENTIN 100 MILLIGRAM(S): 400 CAPSULE ORAL at 17:40

## 2020-06-28 RX ADMIN — LIDOCAINE 1 APPLICATION(S): 4 CREAM TOPICAL at 21:35

## 2020-06-28 RX ADMIN — CHLORHEXIDINE GLUCONATE 1 APPLICATION(S): 213 SOLUTION TOPICAL at 12:05

## 2020-06-28 NOTE — PROGRESS NOTE ADULT - SUBJECTIVE AND OBJECTIVE BOX
Patient is a 69y old  Male who presents with a chief complaint of leg pain (2020 17:19)    PATIENT IS SEEN AND EXAMINED IN MEDICAL FLOOR.    ALLERGIES:  lisinopril (Other)    Daily     Daily Weight in k.7 (2020 06:09)    VITALS:    Vital Signs Last 24 Hrs  T(C): 36.7 (2020 15:53), Max: 37.1 (2020 23:47)  T(F): 98 (2020 15:53), Max: 98.7 (2020 23:47)  HR: 103 (2020 15:53) (64 - 103)  BP: 128/77 (2020 15:53) (97/36 - 128/77)  BP(mean): --  RR: 18 (2020 15:53) (18 - 20)  SpO2: 99% (2020 15:53) (96% - 99%)    LABS:    CBC Full  -  ( 2020 06:50 )  WBC Count : 9.53 K/uL  RBC Count : 4.40 M/uL  Hemoglobin : 12.7 g/dL  Hematocrit : 40.7 %  Platelet Count - Automated : 118 K/uL  Mean Cell Volume : 92.5 fl  Mean Cell Hemoglobin : 28.9 pg  Mean Cell Hemoglobin Concentration : 31.2 gm/dL  Auto Neutrophil # : x  Auto Lymphocyte # : x  Auto Monocyte # : x  Auto Eosinophil # : x  Auto Basophil # : x  Auto Neutrophil % : x  Auto Lymphocyte % : x  Auto Monocyte % : x  Auto Eosinophil % : x  Auto Basophil % : x    PT/INR - ( 2020 06:50 )   PT: 24.7 sec;   INR: 2.14 ratio         PTT - ( 2020 06:50 )  PTT:37.2 sec      132<L>  |  92<L>  |  46<H>  ----------------------------<  101<H>  4.1   |  27  |  8.35<H>    Ca    8.5      2020 06:50      CAPILLARY BLOOD GLUCOSE      POCT Blood Glucose.: 134 mg/dL (2020 16:34)  POCT Blood Glucose.: 217 mg/dL (2020 11:19)  POCT Blood Glucose.: 96 mg/dL (2020 08:16)  POCT Blood Glucose.: 168 mg/dL (2020 21:09)    Creatinine Trend: 8.35<--, 10.20<--, 8.56<--, 10.60<--, 8.82<--, 10.60<--  I&O's Summary    2020 07:01  -  2020 07:00  --------------------------------------------------------  IN: 0 mL / OUT: 3000 mL / NET: -3000 mL    .Blood Blood  - @ 22:15   No Growth Final  --  --    MEDICATIONS:    MEDICATIONS  (STANDING):  aMIOdarone    Tablet 200 milliGRAM(s) Oral daily  aspirin enteric coated 81 milliGRAM(s) Oral daily  calcium acetate 667 milliGRAM(s) Oral three times a day with meals  chlorhexidine 2% Cloths 1 Application(s) Topical daily  dextrose 5%. 1000 milliLiter(s) (50 mL/Hr) IV Continuous <Continuous>  gabapentin 100 milliGRAM(s) Oral two times a day  glycopyrrolate 9 MICROgram(s)/formoterol 4.8 MICROgram(s) Inhaler 2 Puff(s) Inhalation two times a day  insulin lispro (HumaLOG) corrective regimen sliding scale   SubCutaneous three times a day before meals  insulin lispro (HumaLOG) corrective regimen sliding scale   SubCutaneous at bedtime  ketotifen 0.025% Ophthalmic Solution 1 Drop(s) Both EYES two times a day  lidocaine 2% Gel 1 Application(s) Topical every 8 hours  methimazole 5 milliGRAM(s) Oral daily  metoprolol tartrate 100 milliGRAM(s) Oral two times a day  pantoprazole    Tablet 40 milliGRAM(s) Oral before breakfast  senna 2 Tablet(s) Oral at bedtime  simvastatin 40 milliGRAM(s) Oral at bedtime  tiotropium 18 MICROgram(s) Capsule 1 Capsule(s) Inhalation daily  vancomycin  IVPB 1000 milliGRAM(s) IV Intermittent <User Schedule>      MEDICATIONS  (PRN):  acetaminophen   Tablet .. 650 milliGRAM(s) Oral every 6 hours PRN Moderate Pain (4 - 6)  ALBUTerol    90 MICROgram(s) HFA Inhaler 2 Puff(s) Inhalation every 6 hours PRN Shortness of Breath and/or Wheezing      REVIEW OF SYSTEMS:                           ALL ROS DONE [ X   ]    CONSTITUTIONAL:  LETHARGIC [   ], FEVER [   ], UNRESPONSIVE [   ]  CVS:  CP  [   ], SOB, [   ], PALPITATIONS [   ], DIZZYNESS [   ]  RS: COUGH [   ], SPUTUM [   ]  GI: ABDOMINAL PAIN [   ], NAUSEA [   ], VOMITINGS [   ], DIARRHEA [   ], CONSTIPATION [   ]  :  DYSURIA [   ], NOCTURIA [   ], INCREASED FREQUENCY [   ], DRIBLING [   ],  SKELETAL: PAINFUL JOINTS [   ], SWOLLEN JOINTS [   ], NECK ACHE [   ], LOW BACK ACHE [   ],  SKIN : ULCERS [   ], RASH [   ], ITCHING [   ]  CNS: HEAD ACHE [   ], DOUBLE VISION [   ], BLURRED VISION [   ], AMS / CONFUSION [   ], SEIZURES [   ], WEAKNESS [   ],TINGLING / NUMBNESS [   ]    PHYSICAL EXAMINATION:    GENERAL APPEARANCE: NO DISTRESS  HEENT:  NO PALLOR, NO  JVD,  NO   NODES, NECK SUPPLE  CVS: S1 +, S2 +,   RS: AEEB,  OCCASIONAL  RALES +,   NO RONCHI  ABD: SOFT, NT, NO, BS +  EXT: PE +, ERYTHEMA WITH INDURATION OF LEFT LEG +  SKIN: WARM,   SKELETAL:  ROM ACCEPTABLE  CNS:  AAO X  3  , NO  DEFICITS      RADIOLOGY :    < from: US Duplex Venous Lower Ext Complete, Bilateral (20 @ 11:32) >  IMPRESSION:   No evidence of deep venous thrombosis in either lower extremity.    < end of copied text >    < from: Xray Chest 1 View-PORTABLE IMMEDIATE (20 @ 17:48) >  IMPRESSION: Unchanged chest as above.    < end of copied text >    < from: CT Chest No Cont (20 @ 09:21) >  IMPRESSION:     There is a large loculated left pleural effusion which is increased in size since the previous exam. There is associated compressive atelectasis.    < end of copied text >        ASSESSMENT :     Cellulitis  ESRD (end stage renal disease) on dialysis  Stented coronary artery  Hyperthyroidism  CKD (chronic kidney disease)  H/O: CVA  Heart Attack  Coronary Stent  Hypercholesterolemia  CAD (Coronary Artery Disease)  DM (Diabetes Mellitus)  HTN (Hypertension)  S/P CABG x 4  Hemodialysis access, AV graft  S/P cataract extraction  Boil of Buttock      PLAN:  HPI:  69M with CAD s/p multiple stents, s/p CABG (2019 course c/b Afib and LT pleural effusion), Diastolic CHF, HTN, HLD, DM2, Afib on coumadin and ESRD on HD (, Thurs, Sat at [facility]), hyperthyroidism, presented to ER for pain and swelling of left leg since 3 days. Patient reports sustaining injury to posterior aspect of left leg 1 month ago when he accidentally hit his leg against his bed. He reports the wound has not healed since. Reports he developed pain and swelling of left leg since 3 days, 9/10 in severity, with difficulty ambulating. Reports he was advised to get doppler which he reviewed with [facility] cardiologist today, was told he has no clot and recommended to come to ER. Denies fever, chills, weakness, cough, dysuria, NVDC. (2020 19:29)    -  CELLULITIS OF LEFT LEG, DIABETIC LEFT FOOT ULCER  - IMPROVED ON VANCOMYCIN [PER ID RECOMMENDATION, SWITCHED FROM UNASYN+DOXY]. BLOOD  CXS NGTD. ID F/UP BY DR. DUEÑAS    - LOCULATED LEFT SIDED PLEURAL EFFUSION - PULMONOLOGY CONSULT PLACED. SURGERY CONSULT PLACED. PLAN IS FOR LIKELY DRAINAGE TOMORROW, IF NEEDED 2 UNITS FFP TOMORROW, RPT CXR IN A.M.. ON VANCOMYCIN  -  ESRD ON HD - NEPHROLOGY CONSULT IN PROGRESS  - DIASTOLIC CHF - TTE W/ EVIDENCE OF RIGHT/LEFT SYSTOLIC DYSFXN, PULMONARY HTN, MR, STRESS TEST WITHOUT NEW PERFUSION ABNORMALITIES; CARDIOLOGY CONSULT IN PROGRESS  - A/FIB ON COUMADIN - HOLD COUMADIN TODAY IN ANTICIPATION OF PROCEDURE TOMORROW  -  PAD, DIABETIC PERIPHERAL NEUROPATHY - PAIN MGMT CONSULT IN PROGRESS  - CASE DISCUSSED EXTENSIVELY WITH SON [SYLVAIN RENTERIA ] AND PATIENT  -  GI AND DVT PROPHYLAXIS    DONALD DIAZ M.D. COVERING EPHRAIM DIAZ M.D.

## 2020-06-28 NOTE — PROGRESS NOTE ADULT - SUBJECTIVE AND OBJECTIVE BOX
Time of Visit:  Patient seen and examined.     MEDICATIONS  (STANDING):  aMIOdarone    Tablet 200 milliGRAM(s) Oral daily  aspirin enteric coated 81 milliGRAM(s) Oral daily  calcium acetate 667 milliGRAM(s) Oral three times a day with meals  chlorhexidine 2% Cloths 1 Application(s) Topical daily  dextrose 5%. 1000 milliLiter(s) (50 mL/Hr) IV Continuous <Continuous>  gabapentin 100 milliGRAM(s) Oral two times a day  glycopyrrolate 9 MICROgram(s)/formoterol 4.8 MICROgram(s) Inhaler 2 Puff(s) Inhalation two times a day  insulin lispro (HumaLOG) corrective regimen sliding scale   SubCutaneous three times a day before meals  insulin lispro (HumaLOG) corrective regimen sliding scale   SubCutaneous at bedtime  ketotifen 0.025% Ophthalmic Solution 1 Drop(s) Both EYES two times a day  lidocaine 2% Gel 1 Application(s) Topical every 8 hours  methimazole 5 milliGRAM(s) Oral daily  metoprolol tartrate 100 milliGRAM(s) Oral two times a day  pantoprazole    Tablet 40 milliGRAM(s) Oral before breakfast  senna 2 Tablet(s) Oral at bedtime  simvastatin 40 milliGRAM(s) Oral at bedtime  tiotropium 18 MICROgram(s) Capsule 1 Capsule(s) Inhalation daily  vancomycin  IVPB 1000 milliGRAM(s) IV Intermittent <User Schedule>      MEDICATIONS  (PRN):  acetaminophen   Tablet .. 650 milliGRAM(s) Oral every 6 hours PRN Moderate Pain (4 - 6)  ALBUTerol    90 MICROgram(s) HFA Inhaler 2 Puff(s) Inhalation every 6 hours PRN Shortness of Breath and/or Wheezing       Medications up to date at time of exam.      PHYSICAL EXAMINATION:  Patient has no new complaints.  GENERAL: The patient is a well-developed, well-nourished, in no apparent distress.     Vital Signs Last 24 Hrs  T(C): 36.7 (28 Jun 2020 07:59), Max: 37.1 (27 Jun 2020 23:47)  T(F): 98.1 (28 Jun 2020 07:59), Max: 98.7 (27 Jun 2020 23:47)  HR: 78 (28 Jun 2020 07:59) (64 - 83)  BP: 108/47 (28 Jun 2020 07:59) (97/36 - 108/58)  BP(mean): --  RR: 20 (28 Jun 2020 07:59) (18 - 20)  SpO2: 98% (28 Jun 2020 07:59) (96% - 100%)   (if applicable)    Chest Tube (if applicable)    HEENT: Head is normocephalic and atraumatic. Extraocular muscles are intact. Mucous membranes are moist.     NECK: Supple, no palpable adenopathy.    LUNGS: Clear to auscultation, no wheezing, rales, or rhonchi.    HEART: Regular rate and rhythm without murmur.    ABDOMEN: Soft, nontender, and nondistended.  No hepatosplenomegaly is noted.    : No painful voiding, no pelvic pain    EXTREMITIES: Without any cyanosis, clubbing, rash, lesions or edema.    NEUROLOGIC: Awake, alert, oriented, grossly intact    SKIN: Warm, dry, good turgor.      LABS:                        12.7   9.53  )-----------( 118      ( 28 Jun 2020 06:50 )             40.7     06-28    132<L>  |  92<L>  |  46<H>  ----------------------------<  101<H>  4.1   |  27  |  8.35<H>    Ca    8.5      28 Jun 2020 06:50      PT/INR - ( 28 Jun 2020 06:50 )   PT: 24.7 sec;   INR: 2.14 ratio         PTT - ( 28 Jun 2020 06:50 )  PTT:37.2 sec                    MICROBIOLOGY: (if applicable)    RADIOLOGY & ADDITIONAL STUDIES:  EKG:   CXR:  ECHO:    IMPRESSION: 69y Male PAST MEDICAL & SURGICAL HISTORY:  ESRD (end stage renal disease) on dialysis: T-Th-Sat  Stented coronary artery: total 15 stents from [id number]  Hyperthyroidism  H/O: CVA: after cardiac stent placed 12/15/09 -no residual  Heart Attack: 2/1/07 with subsequent stent  Coronary Stent: [id number] 10 stents,  to Ramus 1/2015, cath 01/2016 ( see results in HPI)  Hypercholesterolemia  CAD (Coronary Artery Disease)  DM (Diabetes Mellitus): x 4 yrs without N/N/R  HTN (Hypertension)  S/P CABG x 4  Hemodialysis access, AV graft: 5/2015, L arm  S/P cataract extraction  Boil of Buttock: 2006 and 2008   p/w           IMP: This is a  69 man  with CAD s/p multiple stents, s/p CABG (9/2019 course c/b Afib and LT pleural effusion), Diastolic CHF, HTN, HLD, DM2, Afib on coumadin and ESRD on HD (Tu, Thurs, Sat at [facility]), hyperthyroidism, presented to ER for pain and swelling of left leg since 3 days. Admitted to medicine for LLE cellulitis.  CXR shows gross heart enlargement and mild to moderate left base pleural pulmonary reaction again noted. Sternotomy again seen. Negative for DVT on Doppler study. Blood Cx NGTD. Pt was started on Unasyn and po Doxy, but changed to Vancomycin due to non healing cellulitis. ID consulted.  Improved leg swelling and erythema with Vanco.   Pt. with ESRD on HD, followed by nephro Dr. Calle, HD tolerated. Pt. with significant cardiac hx including AF on Coumadin, followed by cardiology. Coumadin dose adjusted for goal INR 2-3. ECHO resulted EF 40-45% (previous EF 55% in Jan. 2020.  CT chest reveal enlarging left loculated pleural effusion. Pat is not in any resp distress and O2 sat 100 % 2 LPM via NC. He will require chest tube placement but INR is elevated due to coumadin        Sugg;  -hold coumadin  -thoracic surgery for chest tube placement when IR < 1.5  -continue O2 via NC  -not an emergency for chest tube placement or throacentesis since pat is stable from a resp point of view  -6/29 , if INR is still elevated, then please transfuse 2 units FFP and place chest tube  -may need FFP if INR do not improve  -will not recommend Vit K  -cxr 6/28

## 2020-06-28 NOTE — PROGRESS NOTE ADULT - ASSESSMENT
A/P  ESRD ON  HD  FOR  REGULAR  HD  ON  TUESDAY  HB  GOOD,  ROSARIO  ON  HOLD    BP   IS  ACCEPTABLE    HAS NO  VOL  EXCESS     L  PL  EFFUSION,  LOCULATED  , PLAN  FOR  CH TUBE  PLACEMENT WHEN  INR  BETTER    ON  ABX  FOR CELLULITIS  LOWER EXT

## 2020-06-28 NOTE — PROGRESS NOTE ADULT - SUBJECTIVE AND OBJECTIVE BOX
Patient denies CP, SOB Review of systems otherwise (-)    acetaminophen   Tablet .. 650 milliGRAM(s) Oral every 6 hours PRN  ALBUTerol    90 MICROgram(s) HFA Inhaler 2 Puff(s) Inhalation every 6 hours PRN  aMIOdarone    Tablet 200 milliGRAM(s) Oral daily  aspirin enteric coated 81 milliGRAM(s) Oral daily  calcium acetate 667 milliGRAM(s) Oral three times a day with meals  chlorhexidine 2% Cloths 1 Application(s) Topical daily  dextrose 5%. 1000 milliLiter(s) IV Continuous <Continuous>  gabapentin 100 milliGRAM(s) Oral two times a day  glycopyrrolate 9 MICROgram(s)/formoterol 4.8 MICROgram(s) Inhaler 2 Puff(s) Inhalation two times a day  insulin lispro (HumaLOG) corrective regimen sliding scale   SubCutaneous three times a day before meals  insulin lispro (HumaLOG) corrective regimen sliding scale   SubCutaneous at bedtime  ketotifen 0.025% Ophthalmic Solution 1 Drop(s) Both EYES two times a day  lidocaine 2% Gel 1 Application(s) Topical every 8 hours  methimazole 5 milliGRAM(s) Oral daily  metoprolol tartrate 100 milliGRAM(s) Oral two times a day  pantoprazole    Tablet 40 milliGRAM(s) Oral before breakfast  senna 2 Tablet(s) Oral at bedtime  simvastatin 40 milliGRAM(s) Oral at bedtime  tiotropium 18 MICROgram(s) Capsule 1 Capsule(s) Inhalation daily  vancomycin  IVPB 1000 milliGRAM(s) IV Intermittent <User Schedule>                            12.7   9.53  )-----------( 118      ( 28 Jun 2020 06:50 )             40.7       Hemoglobin: 12.7 g/dL (06-28 @ 06:50)  Hemoglobin: 12.5 g/dL (06-27 @ 09:58)  Hemoglobin: 13.1 g/dL (06-26 @ 06:34)  Hemoglobin: 13.9 g/dL (06-25 @ 14:59)  Hemoglobin: 13.4 g/dL (06-24 @ 06:46)      06-28    132<L>  |  92<L>  |  46<H>  ----------------------------<  101<H>  4.1   |  27  |  8.35<H>    Ca    8.5      28 Jun 2020 06:50      Creatinine Trend: 8.35<--, 10.20<--, 8.56<--, 10.60<--, 8.82<--, 10.60<--    COAGS: PT/INR - ( 28 Jun 2020 06:50 )   PT: 24.7 sec;   INR: 2.14 ratio         PTT - ( 28 Jun 2020 06:50 )  PTT:37.2 sec          T(C): 36.7 (06-28-20 @ 15:53), Max: 37.1 (06-27-20 @ 23:47)  HR: 103 (06-28-20 @ 15:53) (64 - 103)  BP: 128/77 (06-28-20 @ 15:53) (97/36 - 128/77)  RR: 18 (06-28-20 @ 15:53) (18 - 20)  SpO2: 99% (06-28-20 @ 15:53) (96% - 99%)  Wt(kg): --    I&O's Summary    27 Jun 2020 07:01  -  28 Jun 2020 07:00  --------------------------------------------------------  IN: 0 mL / OUT: 3000 mL / NET: -3000 mL      Gen: Appears well in NAD  HEENT:  (-)icterus (-)pallor  CV: N S1 S2 1/6 DEJAH (+)2 Pulses B/l  Resp:  Clear to ausculatation B/L, normal effort  GI: (+) BS Soft, NT, ND  Lymph:  (+) minimal Edema, (-)obvious lymphadenopathy  Skin: Warm to touch, Normal turgor  Psych: Appropriate mood and affect      ASSESSMENT/PLAN: 69y Male  CAD s/p multiple stents, s/p CABG (9/2019 course c/b Afib and LT pleural effusion), Normal LV systolic function, severe Diastolic, RV dysfx CHF, HTN, HLD, DM2, Afib on coumadin and ESRD on HD (Tu, Thurs, Sat at [facility]), hyperthyroidism, presented to ER for pain and swelling of left leg since 3 days.    - LE dopplers negative for dVT   - Continue with coumadin for goal INR 2-3    - continue with fluid removal with HD  - HD per renal  - Echo with moderately reduced LV function, not significantly different than time of Bypass.  Stress with no new perfusion abnormalities.   - No need for further inpatient cardiac work up.  - d/c plan per reynold Colin MD, [facility]  BEEPER [phone]

## 2020-06-28 NOTE — CHART NOTE - NSCHARTNOTEFT_GEN_A_CORE
Plan discussed with son Randal [phone]. Son requests attending to discuss chest tube placement with DR. Mackey (nephrologist) [phone]. Son also requests to discuss the plan daily due to father's poor memory.

## 2020-06-28 NOTE — PROGRESS NOTE ADULT - SUBJECTIVE AND OBJECTIVE BOX
Patient is a 69y Male with  ESRD ON  HD   DIALYZED  YESTERDAY    NO  ACUTE  EVENTS  OVERNIGHT      lisinopril (Other)    Hospital Medications:   MEDICATIONS  (STANDING):  aMIOdarone    Tablet 200 milliGRAM(s) Oral daily  aspirin enteric coated 81 milliGRAM(s) Oral daily  calcium acetate 667 milliGRAM(s) Oral three times a day with meals  chlorhexidine 2% Cloths 1 Application(s) Topical daily  dextrose 5%. 1000 milliLiter(s) (50 mL/Hr) IV Continuous <Continuous>  gabapentin 100 milliGRAM(s) Oral two times a day  glycopyrrolate 9 MICROgram(s)/formoterol 4.8 MICROgram(s) Inhaler 2 Puff(s) Inhalation two times a day  insulin lispro (HumaLOG) corrective regimen sliding scale   SubCutaneous three times a day before meals  insulin lispro (HumaLOG) corrective regimen sliding scale   SubCutaneous at bedtime  ketotifen 0.025% Ophthalmic Solution 1 Drop(s) Both EYES two times a day  lidocaine 2% Gel 1 Application(s) Topical every 8 hours  methimazole 5 milliGRAM(s) Oral daily  metoprolol tartrate 100 milliGRAM(s) Oral two times a day  pantoprazole    Tablet 40 milliGRAM(s) Oral before breakfast  senna 2 Tablet(s) Oral at bedtime  simvastatin 40 milliGRAM(s) Oral at bedtime  tiotropium 18 MICROgram(s) Capsule 1 Capsule(s) Inhalation daily  vancomycin  IVPB 1000 milliGRAM(s) IV Intermittent <User Schedule>    REVIEW OF SYSTEMS:  HAS NO   FEVER  CHILLS   NO   SOB  OR  COUGH   NO CH  PAIN  / PALPITATIONS   APPETITE IS  OK, NO  N/V  OR  ABD  PAIN   NO LEG  SWELLING  CONT TO  HAVE  PAIN  IN  HIS  LEGS  BILAT,  CANT  HANG  THEM  DOWN    VITALS:  T(F): 98.1 (06-28-20 @ 07:59), Max: 98.7 (06-27-20 @ 23:47)  HR: 78 (06-28-20 @ 07:59)  BP: 108/47 (06-28-20 @ 07:59)  RR: 20 (06-28-20 @ 07:59)  SpO2: 98% (06-28-20 @ 07:59)  Wt(kg): --    06-27 @ 07:01  -  06-28 @ 07:00  --------------------------------------------------------  IN: 0 mL / OUT: 3000 mL / NET: -3000 mL        PHYSICAL EXAM:  Constitutional: NAD  HEENT: CONJ PINK  Neck: No JVD  Respiratory: NO  BR  SOUNDS L  LUNG  POST  Cardiovascular: S1, S2, RRR  Gastrointestinal: BS+, soft, NT/ND  Extremities:  No peripheral edema  Neurological: A/O x 3  :  No wagner.   Vascular Access: AVF  L  UPPER  ARM,  WORKING  WELL    LABS:  06-28    132<L>  |  92<L>  |  46<H>  ----------------------------<  101<H>  4.1   |  27  |  8.35<H>    Ca    8.5      28 Jun 2020 06:50      Creatinine Trend: 8.35 <--, 10.20 <--, 8.56 <--, 10.60 <--, 8.82 <--, 10.60 <--, 9.06 <--                        12.7   9.53  )-----------( 118      ( 28 Jun 2020 06:50 )             40.7     Urine Studies:      RADIOLOGY & ADDITIONAL STUDIES:

## 2020-06-28 NOTE — PROGRESS NOTE ADULT - SUBJECTIVE AND OBJECTIVE BOX
Pt. seen and examined at bedside resting comfortably.  Denies fever/chills, chest pain, dyspnea, cough, dizziness.     Vital Signs Last 24 Hrs  T(C): 36.7 (28 Jun 2020 07:59), Max: 37.1 (27 Jun 2020 23:47)  T(F): 98.1 (28 Jun 2020 07:59), Max: 98.7 (27 Jun 2020 23:47)  HR: 78 (28 Jun 2020 07:59) (62 - 83)  BP: 108/47 (28 Jun 2020 07:59) (97/36 - 117/57)  BP(mean): --  RR: 20 (28 Jun 2020 07:59) (18 - 20)  SpO2: 98% (28 Jun 2020 07:59) (96% - 100%)    PHYSICAL EXAM:    GENERAL: NAD  HEAD:  Atraumatic, Normocephalic  EYES: EOMI, PERRLA, conjunctiva and sclera clear  CHEST/LUNG: Clear to ausculation, bilaterally   HEART: S1S2  ABDOMEN: non distended, +BS, soft, non tender, no guarding  EXTREMITIES:  calf soft, non tender b/l. 2+ distal pulses b/l.     I&O's Detail    27 Jun 2020 07:01  -  28 Jun 2020 07:00  --------------------------------------------------------  IN:  Total IN: 0 mL    OUT:    Other: 3000 mL  Total OUT: 3000 mL    Total NET: -3000 mL          LABS:                        12.7   9.53  )-----------( 118      ( 28 Jun 2020 06:50 )             40.7     06-28    132<L>  |  92<L>  |  46<H>  ----------------------------<  101<H>  4.1   |  27  |  8.35<H>    Ca    8.5      28 Jun 2020 06:50      PT/INR - ( 28 Jun 2020 06:50 )   PT: 24.7 sec;   INR: 2.14 ratio         PTT - ( 28 Jun 2020 06:50 )  PTT:37.2 sec      type    RADIOLOGY & ADDITIONAL STUDIES:    Impression:   69y Male with large loculated Lt pleural effusion . Pt is stil Supratherapeutic . INR is 2.15    Plan:  -pain management prn  Monitor labs karen coags - CT if subtherapeutic  -antibiotics as planned  -DVT prophylaxis: Heparin and IPCs  -OOB, Ambulating, encourage incentive spirometer  -will discuss pt. with surgical attending

## 2020-06-29 ENCOUNTER — APPOINTMENT (OUTPATIENT)
Dept: PULMONOLOGY | Facility: CLINIC | Age: 70
End: 2020-06-29

## 2020-06-29 ENCOUNTER — TRANSCRIPTION ENCOUNTER (OUTPATIENT)
Age: 70
End: 2020-06-29

## 2020-06-29 DIAGNOSIS — Z71.89 OTHER SPECIFIED COUNSELING: ICD-10-CM

## 2020-06-29 DIAGNOSIS — J90 PLEURAL EFFUSION, NOT ELSEWHERE CLASSIFIED: ICD-10-CM

## 2020-06-29 LAB
ANION GAP SERPL CALC-SCNC: 13 MMOL/L — SIGNIFICANT CHANGE UP (ref 5–17)
APTT BLD: 33.8 SEC — SIGNIFICANT CHANGE UP (ref 27.5–35.5)
APTT BLD: 34.7 SEC — SIGNIFICANT CHANGE UP (ref 27.5–36.3)
BLD GP AB SCN SERPL QL: SIGNIFICANT CHANGE UP
BUN SERPL-MCNC: 55 MG/DL — HIGH (ref 7–18)
CALCIUM SERPL-MCNC: 8.4 MG/DL — SIGNIFICANT CHANGE UP (ref 8.4–10.5)
CHLORIDE SERPL-SCNC: 90 MMOL/L — LOW (ref 96–108)
CO2 SERPL-SCNC: 25 MMOL/L — SIGNIFICANT CHANGE UP (ref 22–31)
CREAT SERPL-MCNC: 10.2 MG/DL — HIGH (ref 0.5–1.3)
GLUCOSE BLDC GLUCOMTR-MCNC: 115 MG/DL — HIGH (ref 70–99)
GLUCOSE BLDC GLUCOMTR-MCNC: 152 MG/DL — HIGH (ref 70–99)
GLUCOSE BLDC GLUCOMTR-MCNC: 161 MG/DL — HIGH (ref 70–99)
GLUCOSE BLDC GLUCOMTR-MCNC: 194 MG/DL — HIGH (ref 70–99)
GLUCOSE SERPL-MCNC: 113 MG/DL — HIGH (ref 70–99)
HCT VFR BLD CALC: 40.5 % — SIGNIFICANT CHANGE UP (ref 39–50)
HGB BLD-MCNC: 12.8 G/DL — LOW (ref 13–17)
INR BLD: 1.43 RATIO — HIGH (ref 0.88–1.16)
INR BLD: 1.84 RATIO — HIGH (ref 0.88–1.16)
MCHC RBC-ENTMCNC: 29.4 PG — SIGNIFICANT CHANGE UP (ref 27–34)
MCHC RBC-ENTMCNC: 31.6 GM/DL — LOW (ref 32–36)
MCV RBC AUTO: 93.1 FL — SIGNIFICANT CHANGE UP (ref 80–100)
NRBC # BLD: 0 /100 WBCS — SIGNIFICANT CHANGE UP (ref 0–0)
PLATELET # BLD AUTO: 124 K/UL — LOW (ref 150–400)
POTASSIUM SERPL-MCNC: 4.5 MMOL/L — SIGNIFICANT CHANGE UP (ref 3.5–5.3)
POTASSIUM SERPL-SCNC: 4.5 MMOL/L — SIGNIFICANT CHANGE UP (ref 3.5–5.3)
PROTHROM AB SERPL-ACNC: 16.5 SEC — HIGH (ref 10.6–13.6)
PROTHROM AB SERPL-ACNC: 21.2 SEC — HIGH (ref 10–12.9)
RBC # BLD: 4.35 M/UL — SIGNIFICANT CHANGE UP (ref 4.2–5.8)
RBC # FLD: 15.6 % — HIGH (ref 10.3–14.5)
SODIUM SERPL-SCNC: 128 MMOL/L — LOW (ref 135–145)
WBC # BLD: 10.32 K/UL — SIGNIFICANT CHANGE UP (ref 3.8–10.5)
WBC # FLD AUTO: 10.32 K/UL — SIGNIFICANT CHANGE UP (ref 3.8–10.5)

## 2020-06-29 PROCEDURE — 99231 SBSQ HOSP IP/OBS SF/LOW 25: CPT

## 2020-06-29 PROCEDURE — 99232 SBSQ HOSP IP/OBS MODERATE 35: CPT | Mod: 57

## 2020-06-29 RX ORDER — SODIUM CHLORIDE 9 MG/ML
1 INJECTION INTRAMUSCULAR; INTRAVENOUS; SUBCUTANEOUS THREE TIMES A DAY
Refills: 0 | Status: COMPLETED | OUTPATIENT
Start: 2020-06-29 | End: 2020-06-30

## 2020-06-29 RX ADMIN — TIOTROPIUM BROMIDE 1 CAPSULE(S): 18 CAPSULE ORAL; RESPIRATORY (INHALATION) at 12:03

## 2020-06-29 RX ADMIN — SODIUM CHLORIDE 1 GRAM(S): 9 INJECTION INTRAMUSCULAR; INTRAVENOUS; SUBCUTANEOUS at 22:56

## 2020-06-29 RX ADMIN — LIDOCAINE 1 APPLICATION(S): 4 CREAM TOPICAL at 22:56

## 2020-06-29 RX ADMIN — SENNA PLUS 2 TABLET(S): 8.6 TABLET ORAL at 22:56

## 2020-06-29 RX ADMIN — Medication 1: at 12:01

## 2020-06-29 RX ADMIN — KETOTIFEN FUMARATE 1 DROP(S): 0.34 SOLUTION OPHTHALMIC at 17:01

## 2020-06-29 RX ADMIN — Medication 667 MILLIGRAM(S): at 08:23

## 2020-06-29 RX ADMIN — Medication 81 MILLIGRAM(S): at 12:02

## 2020-06-29 RX ADMIN — GABAPENTIN 100 MILLIGRAM(S): 400 CAPSULE ORAL at 17:03

## 2020-06-29 RX ADMIN — PANTOPRAZOLE SODIUM 40 MILLIGRAM(S): 20 TABLET, DELAYED RELEASE ORAL at 06:31

## 2020-06-29 RX ADMIN — Medication 667 MILLIGRAM(S): at 17:01

## 2020-06-29 RX ADMIN — GLYCOPYRROLATE AND FORMOTEROL FUMARATE 2 PUFF(S): 9; 4.8 AEROSOL, METERED RESPIRATORY (INHALATION) at 22:57

## 2020-06-29 RX ADMIN — SIMVASTATIN 40 MILLIGRAM(S): 20 TABLET, FILM COATED ORAL at 22:56

## 2020-06-29 RX ADMIN — Medication 667 MILLIGRAM(S): at 12:03

## 2020-06-29 RX ADMIN — GLYCOPYRROLATE AND FORMOTEROL FUMARATE 2 PUFF(S): 9; 4.8 AEROSOL, METERED RESPIRATORY (INHALATION) at 12:02

## 2020-06-29 RX ADMIN — Medication 1: at 17:00

## 2020-06-29 RX ADMIN — LIDOCAINE 1 APPLICATION(S): 4 CREAM TOPICAL at 13:30

## 2020-06-29 RX ADMIN — CHLORHEXIDINE GLUCONATE 1 APPLICATION(S): 213 SOLUTION TOPICAL at 12:03

## 2020-06-29 RX ADMIN — AMIODARONE HYDROCHLORIDE 200 MILLIGRAM(S): 400 TABLET ORAL at 06:31

## 2020-06-29 RX ADMIN — GABAPENTIN 100 MILLIGRAM(S): 400 CAPSULE ORAL at 06:31

## 2020-06-29 RX ADMIN — KETOTIFEN FUMARATE 1 DROP(S): 0.34 SOLUTION OPHTHALMIC at 06:31

## 2020-06-29 RX ADMIN — SODIUM CHLORIDE 1 GRAM(S): 9 INJECTION INTRAMUSCULAR; INTRAVENOUS; SUBCUTANEOUS at 13:30

## 2020-06-29 RX ADMIN — LIDOCAINE 1 APPLICATION(S): 4 CREAM TOPICAL at 06:31

## 2020-06-29 RX ADMIN — Medication 650 MILLIGRAM(S): at 06:31

## 2020-06-29 NOTE — PROGRESS NOTE ADULT - ASSESSMENT
Patient is a 69y Male whom presented to the hospital with LT leg pain and swelling.  Was sent from cardiology clinic where he had gone for routine evaluation as was noted  to have RT leg swelling, to R/O DVT. ( doppler US negative for DVT).admitted for treatment of leg cellulitis   Renal consulted for ESRD    A/P  #ESRD . HD in AM  # Anemia of CKD. stable off ROSARIO  # lytes and acid base at goal  on mx for bilateral leg cellulitis, Neg DVT.   Plans for chest tube placement later today.

## 2020-06-29 NOTE — CHART NOTE - NSCHARTNOTEFT_GEN_A_CORE
I spoke with son of jake Tee  [phone]. Discussed chest tube. He is in agreement and all is questions answered

## 2020-06-29 NOTE — PROGRESS NOTE ADULT - PROBLEM SELECTOR PLAN 3
CT noted above  Pt satting well on supplemental O2  Pulmonary recommends chest tube  CT Surg to place chest tube when INR normalized  Son Randal [phone] wants to speak with team before procedure  Dr. Delaney (Pulmonary) following  CT surgery following

## 2020-06-29 NOTE — PROGRESS NOTE ADULT - ASSESSMENT
69M with large loculated Lt pleural effusion, INR coming down, 1.84 this morning    - F/u am CXR  - FFP and repeat coags  - Left pigtail with poss tPA  - Antibiotics as per medicine  - DVT prophylaxis, venodynes  - OOB, Ambulating, encourage incentive spirometer

## 2020-06-29 NOTE — PROGRESS NOTE ADULT - SUBJECTIVE AND OBJECTIVE BOX
Time of Visit:  Patient seen and examined.     MEDICATIONS  (STANDING):  aMIOdarone    Tablet 200 milliGRAM(s) Oral daily  aspirin enteric coated 81 milliGRAM(s) Oral daily  calcium acetate 667 milliGRAM(s) Oral three times a day with meals  chlorhexidine 2% Cloths 1 Application(s) Topical daily  dextrose 5%. 1000 milliLiter(s) (50 mL/Hr) IV Continuous <Continuous>  gabapentin 100 milliGRAM(s) Oral two times a day  glycopyrrolate 9 MICROgram(s)/formoterol 4.8 MICROgram(s) Inhaler 2 Puff(s) Inhalation two times a day  insulin lispro (HumaLOG) corrective regimen sliding scale   SubCutaneous three times a day before meals  insulin lispro (HumaLOG) corrective regimen sliding scale   SubCutaneous at bedtime  ketotifen 0.025% Ophthalmic Solution 1 Drop(s) Both EYES two times a day  lidocaine 2% Gel 1 Application(s) Topical every 8 hours  methimazole 5 milliGRAM(s) Oral daily  metoprolol tartrate 100 milliGRAM(s) Oral two times a day  pantoprazole    Tablet 40 milliGRAM(s) Oral before breakfast  senna 2 Tablet(s) Oral at bedtime  simvastatin 40 milliGRAM(s) Oral at bedtime  sodium chloride 1 Gram(s) Oral three times a day  tiotropium 18 MICROgram(s) Capsule 1 Capsule(s) Inhalation daily  vancomycin  IVPB 1000 milliGRAM(s) IV Intermittent <User Schedule>      MEDICATIONS  (PRN):  acetaminophen   Tablet .. 650 milliGRAM(s) Oral every 6 hours PRN Moderate Pain (4 - 6)  ALBUTerol    90 MICROgram(s) HFA Inhaler 2 Puff(s) Inhalation every 6 hours PRN Shortness of Breath and/or Wheezing       Medications up to date at time of exam.      PHYSICAL EXAMINATION:  Patient has no new complaints.  GENERAL: The patient is a well-developed, well-nourished, in no apparent distress.     Vital Signs Last 24 Hrs  T(C): 36.6 (29 Jun 2020 05:26), Max: 36.8 (28 Jun 2020 23:20)  T(F): 97.8 (29 Jun 2020 05:26), Max: 98.2 (28 Jun 2020 23:20)  HR: 85 (29 Jun 2020 05:26) (85 - 103)  BP: 105/61 (29 Jun 2020 05:26) (105/61 - 128/77)  BP(mean): --  RR: 18 (29 Jun 2020 05:26) (18 - 18)  SpO2: 98% (29 Jun 2020 05:26) (98% - 99%)   (if applicable)    Chest Tube (if applicable)    HEENT: Head is normocephalic and atraumatic. Extraocular muscles are intact. Mucous membranes are moist.     NECK: Supple, no palpable adenopathy.    LUNGS: left side dullness    HEART: Regular rate and rhythm without murmur.    ABDOMEN: Soft, nontender, and nondistended.  No hepatosplenomegaly is noted.    : No painful voiding, no pelvic pain    EXTREMITIES: Without any cyanosis, clubbing, rash, lesions or edema.    NEUROLOGIC: Awake, alert, oriented, grossly intact    SKIN: Warm, dry, good turgor.      LABS:                        12.8   10.32 )-----------( 124      ( 29 Jun 2020 06:13 )             40.5     06-29    128<L>  |  90<L>  |  55<H>  ----------------------------<  113<H>  4.5   |  25  |  10.20<H>    Ca    8.4      29 Jun 2020 06:13      PT/INR - ( 29 Jun 2020 06:13 )   PT: 21.2 sec;   INR: 1.84 ratio         PTT - ( 29 Jun 2020 06:13 )  PTT:34.7 sec                    MICROBIOLOGY: (if applicable)    RADIOLOGY & ADDITIONAL STUDIES:  EKG:   CXR:  ECHO:    IMPRESSION: 69y Male PAST MEDICAL & SURGICAL HISTORY:  ESRD (end stage renal disease) on dialysis: T-Th-Sat  Stented coronary artery: total 15 stents from 4780-1773  Hyperthyroidism  H/O: CVA: after cardiac stent placed 12/15/09 -no residual  Heart Attack: 2/1/07 with subsequent stent  Coronary Stent: 4458-6972 10 stents,  to Ramus 1/2015, cath 01/2016 ( see results in HPI)  Hypercholesterolemia  CAD (Coronary Artery Disease)  DM (Diabetes Mellitus): x 4 yrs without N/N/R  HTN (Hypertension)  S/P CABG x 4  Hemodialysis access, AV graft: 5/2015, L arm  S/P cataract extraction  Boil of Buttock: 2006 and 2008   p/w           IMP: This is a  69 man  with CAD s/p multiple stents, s/p CABG (9/2019 course c/b Afib and LT pleural effusion), Diastolic CHF, HTN, HLD, DM2, Afib on coumadin and ESRD on HD (Tu, Thurs, Sat at [facility]), hyperthyroidism, presented to ER for pain and swelling of left leg since 3 days. Admitted to medicine for LLE cellulitis.  CXR shows gross heart enlargement and mild to moderate left base pleural pulmonary reaction again noted. Sternotomy again seen. Negative for DVT on Doppler study. Blood Cx NGTD. Pt was started on Unasyn and po Doxy, but changed to Vancomycin due to non healing cellulitis. ID consulted.  Improved leg swelling and erythema with Vanco.   Pt. with ESRD on HD, followed by nephro Dr. Calle, HD tolerated. Pt. with significant cardiac hx including AF on Coumadin, followed by cardiology. Coumadin dose adjusted for goal INR 2-3. ECHO resulted EF 40-45% (previous EF 55% in Jan. 2020.  CT chest reveal enlarging left loculated pleural effusion. Pat is not in any resp distress and O2 sat 100 % 2 LPM via NC. He will require chest tube placement but INR is elevated due to coumadin        Sugg;  -hold coumadin, INR is trending down   -thoracic surgery for chest tube placement when IR < 1.5  -please transfuse 2 units FFP and repeat coag  -continue O2 via NC  -CXR   -will not recommend Vit K

## 2020-06-29 NOTE — PROGRESS NOTE ADULT - SUBJECTIVE AND OBJECTIVE BOX
[facility] : Progress Note :: [phone], (office [phone]),   Dr Calle / Dr Esposito / Dr Macias / Dr Mendiola / Dr Narendra JULIEN / Dr Arroyo / Dr Knight / Dr Renan pickens  _____________________________________________________________________________________________    Pt is sleeping. Did not wake up to answer questions  plans for chest tube placement today.    lisinopril (Other)    Hospital Medications:   MEDICATIONS  (STANDING):  aMIOdarone    Tablet 200 milliGRAM(s) Oral daily  aspirin enteric coated 81 milliGRAM(s) Oral daily  calcium acetate 667 milliGRAM(s) Oral three times a day with meals  chlorhexidine 2% Cloths 1 Application(s) Topical daily  dextrose 5%. 1000 milliLiter(s) (50 mL/Hr) IV Continuous <Continuous>  gabapentin 100 milliGRAM(s) Oral two times a day  glycopyrrolate 9 MICROgram(s)/formoterol 4.8 MICROgram(s) Inhaler 2 Puff(s) Inhalation two times a day  insulin lispro (HumaLOG) corrective regimen sliding scale   SubCutaneous three times a day before meals  insulin lispro (HumaLOG) corrective regimen sliding scale   SubCutaneous at bedtime  ketotifen 0.025% Ophthalmic Solution 1 Drop(s) Both EYES two times a day  lidocaine 2% Gel 1 Application(s) Topical every 8 hours  methimazole 5 milliGRAM(s) Oral daily  metoprolol tartrate 100 milliGRAM(s) Oral two times a day  pantoprazole    Tablet 40 milliGRAM(s) Oral before breakfast  senna 2 Tablet(s) Oral at bedtime  simvastatin 40 milliGRAM(s) Oral at bedtime  sodium chloride 1 Gram(s) Oral three times a day  tiotropium 18 MICROgram(s) Capsule 1 Capsule(s) Inhalation daily  vancomycin  IVPB 1000 milliGRAM(s) IV Intermittent <User Schedule>        VITALS:  T(F): 98.4 (06-29-20 @ 17:14), Max: 98.4 (06-29-20 @ 17:14)  HR: 95 (06-29-20 @ 17:14)  BP: 101/63 (06-29-20 @ 17:14)  RR: 17 (06-29-20 @ 17:14)  SpO2: 97% (06-29-20 @ 17:14)  Wt(kg): --          LABS:  06-29    128<L>  |  90<L>  |  55<H>  ----------------------------<  113<H>  4.5   |  25  |  10.20<H>    Ca    8.4      29 Jun 2020 06:13      Creatinine Trend: 10.20 <--, 8.35 <--, 10.20 <--, 8.56 <--, 10.60 <--, 8.82 <--, 10.60 <--                        12.8   10.32 )-----------( 124      ( 29 Jun 2020 06:13 )             40.5     Urine Studies:      RADIOLOGY & ADDITIONAL STUDIES:

## 2020-06-29 NOTE — PROGRESS NOTE ADULT - PROBLEM SELECTOR PLAN 7
Uncontrolled as evidenced by A1c 7.1   Pt takes Humalog & Lantus at home  Continue with sliding scale insulin  Continue with glucose monitoring  Continue with diabetic diet

## 2020-06-29 NOTE — PROGRESS NOTE ADULT - PROBLEM SELECTOR PLAN 9
Patient admitted from home and is expected to return home when medically stable for discharge   Pt may need home O2 when discharged  Case management and social work following

## 2020-06-29 NOTE — PROGRESS NOTE ADULT - PROBLEM SELECTOR PLAN 5
Not in exacerbation   EF 40-45%  Moderate global left ventricular systolic dysfunction, decreased RV systolic function.   Moderate pulmonary hypertension  Stress with fixed defect, no new perfusion abnormalities.   Dr. Colin (Cardiology) following

## 2020-06-29 NOTE — PROGRESS NOTE ADULT - ATTENDING COMMENTS
HPI:  69M with CAD s/p multiple stents, s/p CABG (9/2019 course c/b Afib and LT pleural effusion), Diastolic CHF, HTN, HLD, DM2, Afib on coumadin and ESRD on HD (Tu, Thurs, Sat at [facility]), hyperthyroidism, presented to ER for pain and swelling of left leg since 3 days. Patient reports sustaining injury to posterior aspect of left leg 1 month ago when he accidentally hit his leg against his bed. He reports the wound has not healed since. Reports he developed pain and swelling of left leg since 3 days, 9/10 in severity, with difficulty ambulating. Reports he was advised to get doppler which he reviewed with [facility] cardiologist today, was told he has no clot and recommended to come to ER. Denies fever, chills, weakness, cough, dysuria, NVDC. (18 Jun 2020 19:29)    -  CELLULITIS OF LEFT LEG, DIABETIC LEFT FOOT ULCER  - IMPROVED ON VANCOMYCIN [PER ID RECOMMENDATION, SWITCHED FROM UNASYN+DOXY]. BLOOD  CXS NGTD. ID F/UP BY DR. DUEÑAS    - LOCULATED LEFT SIDED PLEURAL EFFUSION - PULMONOLOGY CONSULT PLACED. SURGERY CONSULT PLACED. PLAN IS FOR CT-GUIDED DRAINAGE TODAY, ON VANCOMYCIN  -  ESRD ON HD - NEPHROLOGY CONSULT IN PROGRESS  - DIASTOLIC CHF - TTE W/ EVIDENCE OF RIGHT/LEFT SYSTOLIC DYSFXN, PULMONARY HTN, MR, STRESS TEST WITHOUT NEW PERFUSION ABNORMALITIES; CARDIOLOGY CONSULT IN PROGRESS  - A/FIB ON COUMADIN - HOLD COUMADIN, WILL BE GIVEN FFP IN ANTICIPATION OF PROCEDURE  -  PAD, DIABETIC PERIPHERAL NEUROPATHY - PAIN MGMT CONSULT IN PROGRESS  - CASE DISCUSSED EXTENSIVELY WITH SON [SYLVAIN RENTERIA ] AND PATIENT. DISCUSSED RISKS/BENEFITS OF CHEST TUBE PLACEMENT WITH SON, WHO AGREED TO PROCEED WITH INTERVENTION.  -  GI AND DVT PROPHYLAXIS    DONALD DIAZ M.D. COVERING EPHRAIM DIAZ M.D.

## 2020-06-29 NOTE — PROGRESS NOTE ADULT - ASSESSMENT
×If you receive an error when searching the  Registry clear your cache and log back in.  Using Chrome: Delete your browsing History.  Using Internet Explorer:  Clear your browsing data.   Patient Search   Multi-Patient Search   Reports   Drug Listing   Designation   My ROGERS Numbers  Data Detail Level: Printer-Friendly View Extended View  Confidential Drug Utilization Report  Search Terms: jannie hardin, 1950Search Date: 06/24/2020 15:17:19 PM  The Drug Utilization Report below displays all of the controlled substance prescriptions, if any, that your patient has filled in the last twelve months. The information displayed on this report is compiled from pharmacy submissions to the Department, and accurately reflects the information as submitted by the pharmacies.    This report was requested by: French Pabon | Reference #: [id number]    Others' Prescriptions  Patient Name: Jannie HardinBirth Date: 1950  Address: [address] [place] [id number]: Male  Rx Written	Rx Dispensed	Drug	Quantity	Days Supply	Prescriber Name	Payment Method	Dispenser  10/06/2019	10/06/2019	oxycodone hcl 5 mg tablet	20	5	Bernardo Gary Jo	Medicaid	[facility]  * - Drugs marked with an asterisk are compound drugs. If the compound drug is made up of more than one controlled substance, then each controlled

## 2020-06-29 NOTE — PROGRESS NOTE ADULT - SUBJECTIVE AND OBJECTIVE BOX
Patient denies CP, SOB Review of systems otherwise (-)    acetaminophen   Tablet .. 650 milliGRAM(s) Oral every 6 hours PRN  ALBUTerol    90 MICROgram(s) HFA Inhaler 2 Puff(s) Inhalation every 6 hours PRN  aMIOdarone    Tablet 200 milliGRAM(s) Oral daily  aspirin enteric coated 81 milliGRAM(s) Oral daily  calcium acetate 667 milliGRAM(s) Oral three times a day with meals  chlorhexidine 2% Cloths 1 Application(s) Topical daily  dextrose 5%. 1000 milliLiter(s) IV Continuous <Continuous>  gabapentin 100 milliGRAM(s) Oral two times a day  glycopyrrolate 9 MICROgram(s)/formoterol 4.8 MICROgram(s) Inhaler 2 Puff(s) Inhalation two times a day  insulin lispro (HumaLOG) corrective regimen sliding scale   SubCutaneous three times a day before meals  insulin lispro (HumaLOG) corrective regimen sliding scale   SubCutaneous at bedtime  ketotifen 0.025% Ophthalmic Solution 1 Drop(s) Both EYES two times a day  lidocaine 2% Gel 1 Application(s) Topical every 8 hours  methimazole 5 milliGRAM(s) Oral daily  metoprolol tartrate 100 milliGRAM(s) Oral two times a day  pantoprazole    Tablet 40 milliGRAM(s) Oral before breakfast  senna 2 Tablet(s) Oral at bedtime  simvastatin 40 milliGRAM(s) Oral at bedtime  sodium chloride 1 Gram(s) Oral three times a day  tiotropium 18 MICROgram(s) Capsule 1 Capsule(s) Inhalation daily  vancomycin  IVPB 1000 milliGRAM(s) IV Intermittent <User Schedule>                            12.8   10.32 )-----------( 124      ( 29 Jun 2020 06:13 )             40.5       Hemoglobin: 12.8 g/dL (06-29 @ 06:13)  Hemoglobin: 12.7 g/dL (06-28 @ 06:50)  Hemoglobin: 12.5 g/dL (06-27 @ 09:58)  Hemoglobin: 13.1 g/dL (06-26 @ 06:34)  Hemoglobin: 13.9 g/dL (06-25 @ 14:59)      06-29    128<L>  |  90<L>  |  55<H>  ----------------------------<  113<H>  4.5   |  25  |  10.20<H>    Ca    8.4      29 Jun 2020 06:13      Creatinine Trend: 10.20<--, 8.35<--, 10.20<--, 8.56<--, 10.60<--, 8.82<--    COAGS: PT/INR - ( 29 Jun 2020 06:13 )   PT: 21.2 sec;   INR: 1.84 ratio         PTT - ( 29 Jun 2020 06:13 )  PTT:34.7 sec          T(C): 36.6 (06-29-20 @ 05:26), Max: 36.8 (06-28-20 @ 23:20)  HR: 85 (06-29-20 @ 05:26) (85 - 103)  BP: 105/61 (06-29-20 @ 05:26) (105/61 - 128/77)  RR: 18 (06-29-20 @ 05:26) (18 - 18)  SpO2: 98% (06-29-20 @ 05:26) (98% - 99%)  Wt(kg): --    I&O's Summary      Gen: Appears well in NAD  HEENT:  (-)icterus (-)pallor  CV: N S1 S2 1/6 DEJAH (+)2 Pulses B/l  Resp:  Clear to ausculatation B/L, normal effort  GI: (+) BS Soft, NT, ND  Lymph:  (+) minimal Edema, (-)obvious lymphadenopathy  Skin: Warm to touch, Normal turgor  Psych: Appropriate mood and affect      ASSESSMENT/PLAN: 69y Male  CAD s/p multiple stents, s/p CABG (9/2019 course c/b Afib and LT pleural effusion), Normal LV systolic function, severe Diastolic, RV dysfx CHF, HTN, HLD, DM2, Afib on coumadin and ESRD on HD (Tu, Thurs, Sat at [facility]), hyperthyroidism, presented to ER for pain and swelling of left leg since 3 days.    - LE dopplers negative for dVT   - Continue with coumadin for goal INR 2-3    - continue with fluid removal with HD  - HD per renal  - Echo with moderately reduced LV function, not significantly different than time of Bypass.  Stress with no new perfusion abnormalities.   - No need for further inpatient cardiac work up.  - d/c plan per reynold Colin MD, [facility]  BEEPER [phone]

## 2020-06-29 NOTE — PROGRESS NOTE ADULT - PROBLEM SELECTOR PLAN 1
Improving  Afebrile  WBC 10.32  Continue vanco with HD   Dopplers negative   Pain management following, avoid opiates per family   Dr. Rayo (ID) following

## 2020-06-29 NOTE — PROGRESS NOTE ADULT - SUBJECTIVE AND OBJECTIVE BOX
HPI:  69M with CAD s/p multiple stents, s/p CABG (9/2019 course c/b Afib and LT pleural effusion), Diastolic CHF, HTN, HLD, DM2, Afib on coumadin and ESRD on HD (Tu, Thurs, Sat at [facility]), hyperthyroidism, presented to ER for pain and swelling of left leg since 3 days. Admitted to medicine for LLE cellulitis.  CT indicates large L pleural effusion.  Pulmonary recommends chest tube.  CT surgery to place with INR normalized.  Patient examined at bedside, resting comfortably, denies SOB, pain or NVD.  NAD.    OVERNIGHT EVENTS:  No new overnight events     REVIEW OF SYSTEMS:      CONSTITUTIONAL: No fever,   EYES: no acute visual disturbances  NECK: No pain or stiffness  RESPIRATORY: No cough; No shortness of breath  CARDIOVASCULAR: No chest pain, no palpitations  GASTROINTESTINAL: No pain. No nausea, vomiting or diarrhea   NEUROLOGICAL: No headache or numbness, no tremors  MUSCULOSKELETAL: No joint pain, no muscle pain  GENITOURINARY: no dysuria, no frequency, no hesitancy  PSYCHIATRY: no depression , no anxiety  ALL OTHER  ROS negative        Vital Signs Last 24 Hrs  T(C): 36.7 (29 Jun 2020 15:55), Max: 36.8 (28 Jun 2020 23:20)  T(F): 98 (29 Jun 2020 15:55), Max: 98.2 (28 Jun 2020 23:20)  HR: 98 (29 Jun 2020 15:55) (85 - 98)  BP: 107/67 (29 Jun 2020 15:55) (98/53 - 110/68)  BP(mean): --  RR: 17 (29 Jun 2020 15:55) (17 - 18)  SpO2: 99% (29 Jun 2020 15:55) (98% - 99%)    ________________________________________________  PHYSICAL EXAM:    GENERAL: NAD  HEENT: Normocephalic; conjunctivae and sclerae clear; moist mucous membranes;   NECK : supple, no JVD  CHEST/LUNG: Clear to auscultation bilaterally   HEART: S1 S2  regular  ABDOMEN: Soft, Nontender, Nondistended; Bowel sounds present  EXTREMITIES: no cyanosis; no LE edema; no calf tenderness  SKIN: warm and dry; no rash  NERVOUS SYSTEM:  Alert & Oriented x3; no new deficits    _________________________________________________  CURRENT MEDICATIONS:    MEDICATIONS  (STANDING):  aMIOdarone    Tablet 200 milliGRAM(s) Oral daily  aspirin enteric coated 81 milliGRAM(s) Oral daily  calcium acetate 667 milliGRAM(s) Oral three times a day with meals  chlorhexidine 2% Cloths 1 Application(s) Topical daily  dextrose 5%. 1000 milliLiter(s) (50 mL/Hr) IV Continuous <Continuous>  gabapentin 100 milliGRAM(s) Oral two times a day  glycopyrrolate 9 MICROgram(s)/formoterol 4.8 MICROgram(s) Inhaler 2 Puff(s) Inhalation two times a day  insulin lispro (HumaLOG) corrective regimen sliding scale   SubCutaneous three times a day before meals  insulin lispro (HumaLOG) corrective regimen sliding scale   SubCutaneous at bedtime  ketotifen 0.025% Ophthalmic Solution 1 Drop(s) Both EYES two times a day  lidocaine 2% Gel 1 Application(s) Topical every 8 hours  methimazole 5 milliGRAM(s) Oral daily  metoprolol tartrate 100 milliGRAM(s) Oral two times a day  pantoprazole    Tablet 40 milliGRAM(s) Oral before breakfast  senna 2 Tablet(s) Oral at bedtime  simvastatin 40 milliGRAM(s) Oral at bedtime  sodium chloride 1 Gram(s) Oral three times a day  tiotropium 18 MICROgram(s) Capsule 1 Capsule(s) Inhalation daily  vancomycin  IVPB 1000 milliGRAM(s) IV Intermittent <User Schedule>    MEDICATIONS  (PRN):  acetaminophen   Tablet .. 650 milliGRAM(s) Oral every 6 hours PRN Moderate Pain (4 - 6)  ALBUTerol    90 MICROgram(s) HFA Inhaler 2 Puff(s) Inhalation every 6 hours PRN Shortness of Breath and/or Wheezing      __________________________________________________  LABS:                          12.8   10.32 )-----------( 124      ( 29 Jun 2020 06:13 )             40.5     06-29    128<L>  |  90<L>  |  55<H>  ----------------------------<  113<H>  4.5   |  25  |  10.20<H>    Ca    8.4      29 Jun 2020 06:13      PT/INR - ( 29 Jun 2020 06:13 )   PT: 21.2 sec;   INR: 1.84 ratio         PTT - ( 29 Jun 2020 06:13 )  PTT:34.7 sec    CAPILLARY BLOOD GLUCOSE      POCT Blood Glucose.: 161 mg/dL (29 Jun 2020 11:30)  POCT Blood Glucose.: 115 mg/dL (29 Jun 2020 07:53)  POCT Blood Glucose.: 143 mg/dL (28 Jun 2020 21:35)      __________________________________________________  RADIOLOGY & ADDITIONAL TESTS:    Imaging Personally Reviewed:  YES    < from: CT Chest No Cont (06.27.20 @ 09:21) >  IMPRESSION:     There is a large loculated left pleural effusion which is increased in size since the previous exam. There is associated compressive atelectasis.    < end of copied text >    Consultant(s) Notes Reviewed:   YES     Plan of care was discussed with patient and /or primary care giver; all questions and concerns were addressed and care was aligned with patient's wishes.    Plan discussed with attending and consulting physicians.

## 2020-06-29 NOTE — CHART NOTE - NSCHARTNOTEFT_GEN_A_CORE
Informed by NP that pt and son are apprehensive about chest catheter placement. Pt does not report of respiratory distress and chest catheter not emergent as present time. As such, FFP is held and goal is to get INR to therapeutic range. Please call thoracic surgery if pt agreeable to procedure and INR at safe level for bedside procedure.

## 2020-06-29 NOTE — PROGRESS NOTE ADULT - PROBLEM SELECTOR PLAN 1
Pt with left lower leg pain which is somatic and neuropathic in nature due to cellulitis. Plan is for IR for drainage of large left pleural effusion per primary team.  High risk medications reviewed. Avoid polypharmacy. Avoid IV opioids. Avoid NSAIDs and benzodiazepines. Non-opioid medications and non-pharmacological pain management measures initiated.   Opioid pain recommendations   - Maximize non-opioid pain recommendations. Per pt's family, pt is sensitive to opioids, experiences delirium and/or aggression.  Non-opioid pain recommendations   - Continue Acetaminophen 650mg PO q 6 hours PRN moderate pain.   - Continue Gabapentin 100mg po q 12 hours, renally dosed.  - Avoid NSAIDs due to ESRD  - Lidocaine gel to b/l legs q8h.   Bowel Regimen  - Continue Senna 2 tablets at bedtime for constipation  Mild pain   - Non-pharmacological pain treatment recommendations  - Warm/ Cool packs PRN   - Repositioning extremity, elevation, imagery, relaxation, distraction.  - Physical therapy OOB if no contraindications   Recommendations discussed with primary team and RN

## 2020-06-29 NOTE — PROGRESS NOTE ADULT - PROBLEM SELECTOR PLAN 4
Pt on Coumadin  Held secondary to supra therapeutic and now for chest tube  Follow up with CT surgery for resumption  Follow up INR in AM

## 2020-06-29 NOTE — PROGRESS NOTE ADULT - SUBJECTIVE AND OBJECTIVE BOX
INTERVAL HPI/OVERNIGHT EVENTS:  No acute events overnight. Pt resting comfortably. No acute respiratory complaints. Reports left leg pain.    MEDICATIONS  (STANDING):  aMIOdarone    Tablet 200 milliGRAM(s) Oral daily  aspirin enteric coated 81 milliGRAM(s) Oral daily  calcium acetate 667 milliGRAM(s) Oral three times a day with meals  chlorhexidine 2% Cloths 1 Application(s) Topical daily  dextrose 5%. 1000 milliLiter(s) (50 mL/Hr) IV Continuous <Continuous>  gabapentin 100 milliGRAM(s) Oral two times a day  glycopyrrolate 9 MICROgram(s)/formoterol 4.8 MICROgram(s) Inhaler 2 Puff(s) Inhalation two times a day  insulin lispro (HumaLOG) corrective regimen sliding scale   SubCutaneous three times a day before meals  insulin lispro (HumaLOG) corrective regimen sliding scale   SubCutaneous at bedtime  ketotifen 0.025% Ophthalmic Solution 1 Drop(s) Both EYES two times a day  lidocaine 2% Gel 1 Application(s) Topical every 8 hours  methimazole 5 milliGRAM(s) Oral daily  metoprolol tartrate 100 milliGRAM(s) Oral two times a day  pantoprazole    Tablet 40 milliGRAM(s) Oral before breakfast  senna 2 Tablet(s) Oral at bedtime  simvastatin 40 milliGRAM(s) Oral at bedtime  sodium chloride 1 Gram(s) Oral three times a day  tiotropium 18 MICROgram(s) Capsule 1 Capsule(s) Inhalation daily  vancomycin  IVPB 1000 milliGRAM(s) IV Intermittent <User Schedule>    MEDICATIONS  (PRN):  acetaminophen   Tablet .. 650 milliGRAM(s) Oral every 6 hours PRN Moderate Pain (4 - 6)  ALBUTerol    90 MICROgram(s) HFA Inhaler 2 Puff(s) Inhalation every 6 hours PRN Shortness of Breath and/or Wheezing      Vital Signs Last 24 Hrs  T(C): 36.6 (29 Jun 2020 05:26), Max: 36.8 (28 Jun 2020 23:20)  T(F): 97.8 (29 Jun 2020 05:26), Max: 98.2 (28 Jun 2020 23:20)  HR: 85 (29 Jun 2020 05:26) (85 - 103)  BP: 105/61 (29 Jun 2020 05:26) (105/61 - 128/77)  RR: 18 (29 Jun 2020 05:26) (18 - 18)  SpO2: 98% (29 Jun 2020 05:26) (98% - 99%)    Physical:  General: Alert and oriented, not in acute distress  Resp: Breathing unlabored, no accessory muscle use, saturating 98-99% on 2L NC  Extremities: LLE with small dried ulcer posterior lower leg, no calf pain    LABS:                        12.8   10.32 )-----------( 124      ( 29 Jun 2020 06:13 )             40.5             06-29    128<L>  |  90<L>  |  55<H>  ----------------------------<  113<H>  4.5   |  25  |  10.20<H>    Ca    8.4      29 Jun 2020 06:13

## 2020-06-30 ENCOUNTER — RESULT REVIEW (OUTPATIENT)
Age: 70
End: 2020-06-30

## 2020-06-30 LAB
ANION GAP SERPL CALC-SCNC: 14 MMOL/L — SIGNIFICANT CHANGE UP (ref 5–17)
APTT BLD: 37.7 SEC — HIGH (ref 27.5–35.5)
B PERT IGG+IGM PNL SER: ABNORMAL
BUN SERPL-MCNC: 78 MG/DL — HIGH (ref 7–18)
CALCIUM SERPL-MCNC: 9 MG/DL — SIGNIFICANT CHANGE UP (ref 8.4–10.5)
CHLORIDE SERPL-SCNC: 91 MMOL/L — LOW (ref 96–108)
CO2 SERPL-SCNC: 22 MMOL/L — SIGNIFICANT CHANGE UP (ref 22–31)
COLOR FLD: SIGNIFICANT CHANGE UP
CREAT SERPL-MCNC: 11.9 MG/DL — HIGH (ref 0.5–1.3)
FLUID INTAKE SUBSTANCE CLASS: SIGNIFICANT CHANGE UP
FLUID SEGMENTED GRANULOCYTES: 66 % — SIGNIFICANT CHANGE UP
GLUCOSE BLDC GLUCOMTR-MCNC: 112 MG/DL — HIGH (ref 70–99)
GLUCOSE BLDC GLUCOMTR-MCNC: 114 MG/DL — HIGH (ref 70–99)
GLUCOSE BLDC GLUCOMTR-MCNC: 130 MG/DL — HIGH (ref 70–99)
GLUCOSE BLDC GLUCOMTR-MCNC: 132 MG/DL — HIGH (ref 70–99)
GLUCOSE BLDC GLUCOMTR-MCNC: 180 MG/DL — HIGH (ref 70–99)
GLUCOSE SERPL-MCNC: 133 MG/DL — HIGH (ref 70–99)
HCT VFR BLD CALC: 41.7 % — SIGNIFICANT CHANGE UP (ref 39–50)
HGB BLD-MCNC: 13.1 G/DL — SIGNIFICANT CHANGE UP (ref 13–17)
INR BLD: 1.62 RATIO — HIGH (ref 0.88–1.16)
LYMPHOCYTES # FLD: 29 % — SIGNIFICANT CHANGE UP
MCHC RBC-ENTMCNC: 29.3 PG — SIGNIFICANT CHANGE UP (ref 27–34)
MCHC RBC-ENTMCNC: 31.4 GM/DL — LOW (ref 32–36)
MCV RBC AUTO: 93.3 FL — SIGNIFICANT CHANGE UP (ref 80–100)
MESOTHL CELL # FLD: 2 % — SIGNIFICANT CHANGE UP
MONOS+MACROS # FLD: 3 % — SIGNIFICANT CHANGE UP
NRBC # BLD: 0 /100 WBCS — SIGNIFICANT CHANGE UP (ref 0–0)
PLATELET # BLD AUTO: 139 K/UL — LOW (ref 150–400)
POTASSIUM SERPL-MCNC: 5.4 MMOL/L — HIGH (ref 3.5–5.3)
POTASSIUM SERPL-SCNC: 5.4 MMOL/L — HIGH (ref 3.5–5.3)
PROTHROM AB SERPL-ACNC: 18.5 SEC — HIGH (ref 10.6–13.6)
RBC # BLD: 4.47 M/UL — SIGNIFICANT CHANGE UP (ref 4.2–5.8)
RBC # FLD: 16 % — HIGH (ref 10.3–14.5)
RCV VOL RI: HIGH /UL (ref 0–5)
SODIUM SERPL-SCNC: 127 MMOL/L — LOW (ref 135–145)
TOTAL NUCLEATED CELL COUNT, BODY FLUID: 175 /UL — SIGNIFICANT CHANGE UP
TUBE TYPE: SIGNIFICANT CHANGE UP
WBC # BLD: 14.79 K/UL — HIGH (ref 3.8–10.5)
WBC # FLD AUTO: 14.79 K/UL — HIGH (ref 3.8–10.5)

## 2020-06-30 PROCEDURE — 32554 ASPIRATE PLEURA W/O IMAGING: CPT

## 2020-06-30 PROCEDURE — 88305 TISSUE EXAM BY PATHOLOGIST: CPT | Mod: 26

## 2020-06-30 PROCEDURE — 88112 CYTOPATH CELL ENHANCE TECH: CPT | Mod: 26

## 2020-06-30 PROCEDURE — 71045 X-RAY EXAM CHEST 1 VIEW: CPT | Mod: 26,77

## 2020-06-30 PROCEDURE — 93971 EXTREMITY STUDY: CPT | Mod: 26,LT

## 2020-06-30 PROCEDURE — 99232 SBSQ HOSP IP/OBS MODERATE 35: CPT

## 2020-06-30 PROCEDURE — 71045 X-RAY EXAM CHEST 1 VIEW: CPT | Mod: 26

## 2020-06-30 RX ORDER — ENOXAPARIN SODIUM 100 MG/ML
80 INJECTION SUBCUTANEOUS ONCE
Refills: 0 | Status: COMPLETED | OUTPATIENT
Start: 2020-06-30 | End: 2020-06-30

## 2020-06-30 RX ORDER — ENOXAPARIN SODIUM 100 MG/ML
81.2 INJECTION SUBCUTANEOUS ONCE
Refills: 0 | Status: DISCONTINUED | OUTPATIENT
Start: 2020-06-30 | End: 2020-06-30

## 2020-06-30 RX ORDER — SODIUM CHLORIDE 9 MG/ML
2 INJECTION INTRAMUSCULAR; INTRAVENOUS; SUBCUTANEOUS EVERY 6 HOURS
Refills: 0 | Status: DISCONTINUED | OUTPATIENT
Start: 2020-06-30 | End: 2020-06-30

## 2020-06-30 RX ORDER — LIDOCAINE HCL 20 MG/ML
20 VIAL (ML) INJECTION ONCE
Refills: 0 | Status: DISCONTINUED | OUTPATIENT
Start: 2020-06-30 | End: 2020-07-05

## 2020-06-30 RX ORDER — ACETAMINOPHEN 500 MG
1000 TABLET ORAL ONCE
Refills: 0 | Status: DISCONTINUED | OUTPATIENT
Start: 2020-06-30 | End: 2020-07-05

## 2020-06-30 RX ADMIN — GABAPENTIN 100 MILLIGRAM(S): 400 CAPSULE ORAL at 06:53

## 2020-06-30 RX ADMIN — ENOXAPARIN SODIUM 80 MILLIGRAM(S): 100 INJECTION SUBCUTANEOUS at 23:33

## 2020-06-30 RX ADMIN — GLYCOPYRROLATE AND FORMOTEROL FUMARATE 2 PUFF(S): 9; 4.8 AEROSOL, METERED RESPIRATORY (INHALATION) at 12:23

## 2020-06-30 RX ADMIN — SODIUM CHLORIDE 2 GRAM(S): 9 INJECTION INTRAMUSCULAR; INTRAVENOUS; SUBCUTANEOUS at 12:21

## 2020-06-30 RX ADMIN — KETOTIFEN FUMARATE 1 DROP(S): 0.34 SOLUTION OPHTHALMIC at 17:38

## 2020-06-30 RX ADMIN — Medication 650 MILLIGRAM(S): at 23:56

## 2020-06-30 RX ADMIN — SODIUM CHLORIDE 1 GRAM(S): 9 INJECTION INTRAMUSCULAR; INTRAVENOUS; SUBCUTANEOUS at 06:53

## 2020-06-30 RX ADMIN — PANTOPRAZOLE SODIUM 40 MILLIGRAM(S): 20 TABLET, DELAYED RELEASE ORAL at 06:53

## 2020-06-30 RX ADMIN — Medication 667 MILLIGRAM(S): at 12:20

## 2020-06-30 RX ADMIN — TIOTROPIUM BROMIDE 1 CAPSULE(S): 18 CAPSULE ORAL; RESPIRATORY (INHALATION) at 12:22

## 2020-06-30 RX ADMIN — KETOTIFEN FUMARATE 1 DROP(S): 0.34 SOLUTION OPHTHALMIC at 08:02

## 2020-06-30 RX ADMIN — LIDOCAINE 1 APPLICATION(S): 4 CREAM TOPICAL at 14:51

## 2020-06-30 RX ADMIN — Medication 250 MILLIGRAM(S): at 10:45

## 2020-06-30 RX ADMIN — CHLORHEXIDINE GLUCONATE 1 APPLICATION(S): 213 SOLUTION TOPICAL at 12:20

## 2020-06-30 RX ADMIN — AMIODARONE HYDROCHLORIDE 200 MILLIGRAM(S): 400 TABLET ORAL at 06:53

## 2020-06-30 RX ADMIN — SIMVASTATIN 40 MILLIGRAM(S): 20 TABLET, FILM COATED ORAL at 21:57

## 2020-06-30 RX ADMIN — LIDOCAINE 1 APPLICATION(S): 4 CREAM TOPICAL at 06:54

## 2020-06-30 RX ADMIN — GLYCOPYRROLATE AND FORMOTEROL FUMARATE 2 PUFF(S): 9; 4.8 AEROSOL, METERED RESPIRATORY (INHALATION) at 21:58

## 2020-06-30 RX ADMIN — Medication 81 MILLIGRAM(S): at 12:22

## 2020-06-30 RX ADMIN — Medication 667 MILLIGRAM(S): at 17:38

## 2020-06-30 RX ADMIN — SENNA PLUS 2 TABLET(S): 8.6 TABLET ORAL at 21:57

## 2020-06-30 RX ADMIN — LIDOCAINE 1 APPLICATION(S): 4 CREAM TOPICAL at 21:57

## 2020-06-30 RX ADMIN — Medication 667 MILLIGRAM(S): at 08:02

## 2020-06-30 NOTE — PROGRESS NOTE ADULT - PROBLEM SELECTOR PLAN 3
CT noted above  Pt satting well on supplemental O2  Pulmonary recommends chest tube  CT Surg to place chest tube s/p dialysis  Son Randal [phone] wants to speak with team before procedure  Dr. Delaney (Pulmonary) following  CT surgery following

## 2020-06-30 NOTE — CHART NOTE - NSCHARTNOTEFT_GEN_A_CORE
Left pigtail catheter placement attempted by Dr. Torres, unsuccessful.   Patient s/p thoracocentesis. Drained 150cc of blood tinged pleural fluid. Specimen sent for cytopathology and culture.  Follow up post procedure CXR  Plan for IR pigtail catheter placement in AM  NPO after midnight  Continue to hold anticoagulation if safe

## 2020-06-30 NOTE — PROGRESS NOTE ADULT - PROBLEM SELECTOR PLAN 1
Improving  Afebrile  WBC 14.79  Continue vanco with HD   Dopplers negative   Pain management following, avoid opiates per family   Dr. Rayo (ID) following

## 2020-06-30 NOTE — PROGRESS NOTE ADULT - SUBJECTIVE AND OBJECTIVE BOX
INTERVAL HPI/OVERNIGHT EVENTS:  No acute events overnight. Pt resting comfortably. No respiratory complaints.     MEDICATIONS  (STANDING):  aMIOdarone    Tablet 200 milliGRAM(s) Oral daily  aspirin enteric coated 81 milliGRAM(s) Oral daily  calcium acetate 667 milliGRAM(s) Oral three times a day with meals  chlorhexidine 2% Cloths 1 Application(s) Topical daily  dextrose 5%. 1000 milliLiter(s) (50 mL/Hr) IV Continuous <Continuous>  gabapentin 100 milliGRAM(s) Oral two times a day  glycopyrrolate 9 MICROgram(s)/formoterol 4.8 MICROgram(s) Inhaler 2 Puff(s) Inhalation two times a day  insulin lispro (HumaLOG) corrective regimen sliding scale   SubCutaneous three times a day before meals  insulin lispro (HumaLOG) corrective regimen sliding scale   SubCutaneous at bedtime  ketotifen 0.025% Ophthalmic Solution 1 Drop(s) Both EYES two times a day  lidocaine 2% Gel 1 Application(s) Topical every 8 hours  methimazole 5 milliGRAM(s) Oral daily  metoprolol tartrate 100 milliGRAM(s) Oral two times a day  pantoprazole    Tablet 40 milliGRAM(s) Oral before breakfast  senna 2 Tablet(s) Oral at bedtime  simvastatin 40 milliGRAM(s) Oral at bedtime  sodium chloride 2 Gram(s) Oral every 6 hours  tiotropium 18 MICROgram(s) Capsule 1 Capsule(s) Inhalation daily    MEDICATIONS  (PRN):  acetaminophen   Tablet .. 650 milliGRAM(s) Oral every 6 hours PRN Moderate Pain (4 - 6)  ALBUTerol    90 MICROgram(s) HFA Inhaler 2 Puff(s) Inhalation every 6 hours PRN Shortness of Breath and/or Wheezing      Vital Signs Last 24 Hrs  T(C): 37.5 (30 Jun 2020 08:53), Max: 37.5 (30 Jun 2020 08:53)  T(F): 99.5 (30 Jun 2020 08:53), Max: 99.5 (30 Jun 2020 08:53)  HR: 116 (30 Jun 2020 08:53) (91 - 116)  BP: 108/63 (30 Jun 2020 08:53) (98/53 - 116/65)  RR: 22 (30 Jun 2020 08:53) (17 - 22)  SpO2: 98% (30 Jun 2020 09:00) (93% - 99%)    Physical:  General: Alert and oriented, not in acute distress  Resp: Breathing unlabored, no accessory muscle use, saturating 98% on 3L NC  Extremities: LLE with small dried ulcer posterior lower leg, no calf pain    LABS:                      13.1   14.79 )-----------( 139      ( 30 Jun 2020 09:03 )             41.7             06-30    127<L>  |  91<L>  |  78<H>  ----------------------------<  133<H>  5.4<H>   |  22  |  11.90<H>    Ca    9.0      30 Jun 2020 09:03

## 2020-06-30 NOTE — CHART NOTE - NSCHARTNOTEFT_GEN_A_CORE
EVENT:  Patient experiencing tremors      HPI:  69M with CAD s/p multiple stents, s/p CABG (9/2019 course c/b Afib and LT pleural effusion), Diastolic CHF, HTN, HLD, DM2, Afib on coumadin and ESRD on HD (Gay Griffith, Sat at [facility]), hyperthyroidism, presented to ER for pain and swelling of left leg since 3 days. Patient reports sustaining injury to posterior aspect of left leg 1 month ago when he accidentally hit his leg against his bed. He reports the wound has not healed since. Reports he developed pain and swelling of left leg since 3 days, 9/10 in severity, with difficulty ambulating. Reports he was advised to get doppler which he reviewed with hic cardiologist today, was told he has no clot and recommended to come to ER. Denies fever, chills, weakness, cough, dysuria, NVDC. (18 Jun 2020 19:29)        OBJECTIVE:  Vital Signs Last 24 Hrs  T(C): 36.7 (30 Jun 2020 12:40), Max: 37.5 (30 Jun 2020 08:53)  T(F): 98 (30 Jun 2020 12:40), Max: 99.5 (30 Jun 2020 08:53)  HR: 124 (30 Jun 2020 12:40) (94 - 124)  BP: 108/65 (30 Jun 2020 12:40) (99/64 - 125/74)  BP(mean): --  RR: 24 (30 Jun 2020 12:40) (17 - 24)  SpO2: 97% (30 Jun 2020 12:40) (93% - 100%)    LABS:                        13.1   14.79 )-----------( 139      ( 30 Jun 2020 09:03 )             41.7     06-30    127<L>  |  91<L>  |  78<H>  ----------------------------<  133<H>  5.4<H>   |  22  |  11.90<H>    Ca    9.0      30 Jun 2020 09:03        ASSESSMENT:  Problem:  Patient experiencing tremors during exam by nephrology.  Nephro advised Gabapentin is the only medication with such side effect, recommend discontinue.    PLAN: D/c Gabapentin

## 2020-06-30 NOTE — PROGRESS NOTE ADULT - SUBJECTIVE AND OBJECTIVE BOX
[facility] : Progress Note :: [phone], (office [phone]),   Dr Calle / Dr Esposito / Dr Macias / Dr Mendiola / Dr Narendra JULIEN / Dr Arroyo / Dr Knight / Dr Renan pickens  _____________________________________________________________________________________________   confused, has a tremor.  was tachycardic on HD today , thus minimal UF     lisinopril (Other)  opioid-like analgesics (Other)    Hospital Medications:   MEDICATIONS  (STANDING):  aMIOdarone    Tablet 200 milliGRAM(s) Oral daily  aspirin enteric coated 81 milliGRAM(s) Oral daily  calcium acetate 667 milliGRAM(s) Oral three times a day with meals  chlorhexidine 2% Cloths 1 Application(s) Topical daily  dextrose 5%. 1000 milliLiter(s) (50 mL/Hr) IV Continuous <Continuous>  glycopyrrolate 9 MICROgram(s)/formoterol 4.8 MICROgram(s) Inhaler 2 Puff(s) Inhalation two times a day  insulin lispro (HumaLOG) corrective regimen sliding scale   SubCutaneous three times a day before meals  insulin lispro (HumaLOG) corrective regimen sliding scale   SubCutaneous at bedtime  ketotifen 0.025% Ophthalmic Solution 1 Drop(s) Both EYES two times a day  lidocaine 1% Injectable 20 milliLiter(s) Local Injection once  lidocaine 2% Gel 1 Application(s) Topical every 8 hours  methimazole 5 milliGRAM(s) Oral daily  metoprolol tartrate 100 milliGRAM(s) Oral two times a day  pantoprazole    Tablet 40 milliGRAM(s) Oral before breakfast  senna 2 Tablet(s) Oral at bedtime  simvastatin 40 milliGRAM(s) Oral at bedtime  tiotropium 18 MICROgram(s) Capsule 1 Capsule(s) Inhalation daily        VITALS:  T(F): 97.3 (06-30-20 @ 16:07), Max: 99.5 (06-30-20 @ 08:53)  HR: 125 (06-30-20 @ 16:07)  BP: 120/75 (06-30-20 @ 16:07)  RR: 22 (06-30-20 @ 16:07)  SpO2: 100% (06-30-20 @ 16:07)  Wt(kg): --    06-30 @ 07:01  -  06-30 @ 16:21  --------------------------------------------------------  IN: 500 mL / OUT: 530 mL / NET: -30 mL        PHYSICAL EXAM:  Constitutional: NAD  HEENT: anicteric sclera, oropharynx clear, MMM  Neck: No JVD  Respiratory: CTAB, no wheezes, rales or rhonchi  Cardiovascular: S1, S2, RRR  Gastrointestinal: BS+, soft, NT/ND  Extremities:  peripheral edema+  Neurological: AWAKE. CONFUSED. ORIETENED TO PERSON BUT NOT PLACE OR TIME  Psychiatric: Normal mood, normal affect  Vascular Access: AVF with thrill and bruit    LABS:  06-30    127<L>  |  91<L>  |  78<H>  ----------------------------<  133<H>  5.4<H>   |  22  |  11.90<H>    Ca    9.0      30 Jun 2020 09:03      Creatinine Trend: 11.90 <--, 10.20 <--, 8.35 <--, 10.20 <--, 8.56 <--, 10.60 <--, 8.82 <--                        13.1   14.79 )-----------( 139      ( 30 Jun 2020 09:03 )             41.7     Urine Studies:      RADIOLOGY & ADDITIONAL STUDIES:

## 2020-06-30 NOTE — PROGRESS NOTE ADULT - SUBJECTIVE AND OBJECTIVE BOX
Patient denies CP, SOB Review of systems otherwise (-)    acetaminophen   Tablet .. 650 milliGRAM(s) Oral every 6 hours PRN  ALBUTerol    90 MICROgram(s) HFA Inhaler 2 Puff(s) Inhalation every 6 hours PRN  aMIOdarone    Tablet 200 milliGRAM(s) Oral daily  aspirin enteric coated 81 milliGRAM(s) Oral daily  calcium acetate 667 milliGRAM(s) Oral three times a day with meals  chlorhexidine 2% Cloths 1 Application(s) Topical daily  dextrose 5%. 1000 milliLiter(s) IV Continuous <Continuous>  glycopyrrolate 9 MICROgram(s)/formoterol 4.8 MICROgram(s) Inhaler 2 Puff(s) Inhalation two times a day  insulin lispro (HumaLOG) corrective regimen sliding scale   SubCutaneous three times a day before meals  insulin lispro (HumaLOG) corrective regimen sliding scale   SubCutaneous at bedtime  ketotifen 0.025% Ophthalmic Solution 1 Drop(s) Both EYES two times a day  lidocaine 1% Injectable 20 milliLiter(s) Local Injection once  lidocaine 2% Gel 1 Application(s) Topical every 8 hours  methimazole 5 milliGRAM(s) Oral daily  metoprolol tartrate 100 milliGRAM(s) Oral two times a day  pantoprazole    Tablet 40 milliGRAM(s) Oral before breakfast  senna 2 Tablet(s) Oral at bedtime  simvastatin 40 milliGRAM(s) Oral at bedtime  tiotropium 18 MICROgram(s) Capsule 1 Capsule(s) Inhalation daily                            13.1   14.79 )-----------( 139      ( 30 Jun 2020 09:03 )             41.7       Hemoglobin: 13.1 g/dL (06-30 @ 09:03)  Hemoglobin: 12.8 g/dL (06-29 @ 06:13)  Hemoglobin: 12.7 g/dL (06-28 @ 06:50)  Hemoglobin: 12.5 g/dL (06-27 @ 09:58)  Hemoglobin: 13.1 g/dL (06-26 @ 06:34)      06-30    127<L>  |  91<L>  |  78<H>  ----------------------------<  133<H>  5.4<H>   |  22  |  11.90<H>    Ca    9.0      30 Jun 2020 09:03      Creatinine Trend: 11.90<--, 10.20<--, 8.35<--, 10.20<--, 8.56<--, 10.60<--    COAGS: PT/INR - ( 30 Jun 2020 09:03 )   PT: 18.5 sec;   INR: 1.62 ratio         PTT - ( 30 Jun 2020 09:03 )  PTT:37.7 sec          T(C): 36.3 (06-30-20 @ 16:07), Max: 37.5 (06-30-20 @ 08:53)  HR: 125 (06-30-20 @ 16:07) (94 - 125)  BP: 120/75 (06-30-20 @ 16:07) (99/64 - 125/74)  RR: 22 (06-30-20 @ 16:07) (17 - 24)  SpO2: 100% (06-30-20 @ 16:07) (93% - 100%)  Wt(kg): --    I&O's Summary    30 Jun 2020 07:01  -  30 Jun 2020 16:54  --------------------------------------------------------  IN: 500 mL / OUT: 530 mL / NET: -30 mL        Gen: Appears well in NAD  HEENT:  (-)icterus (-)pallor  CV: N S1 S2 1/6 DEJAH (+)2 Pulses B/l  Resp:  Clear to ausculatation B/L, normal effort  GI: (+) BS Soft, NT, ND  Lymph:  (+) minimal Edema, (-)obvious lymphadenopathy  Skin: Warm to touch, Normal turgor  Psych: Appropriate mood and affect      ASSESSMENT/PLAN: 69y Male  CAD s/p multiple stents, s/p CABG (9/2019 course c/b Afib and LT pleural effusion), Normal LV systolic function, severe Diastolic, RV dysfx CHF, HTN, HLD, DM2, Afib on coumadin and ESRD on HD (Tu, Thurs, Sat at [facility]), hyperthyroidism, presented to ER for pain and swelling of left leg since 3 days.    - LE dopplers negative for dVT   - Continue with coumadin for goal INR 2-3    - continue with fluid removal with HD  - HD per renal  - Echo with moderately reduced LV function, not significantly different than time of Bypass.  Stress with no new perfusion abnormalities.   - loculated pleural effusion for Pig tail today  - Coumadin on hold      Murphy Colin MD, [facility]  BEEPER [phone]

## 2020-06-30 NOTE — CHART NOTE - NSCHARTNOTEFT_GEN_A_CORE
EVENT:      70 y/o male with PMH CAD s/p multiple stents, s/p CABG (9/2019 course c/b Afib and LT pleural effusion), Diastolic CHF, HTN, HLD, DM2, Afib on coumadin and ESRD on HD (Tu, Thurs, Sat at [facility]), hyperthyroidism, presented to ED for pain and swelling of left leg since 3 days. Patient reported sustaining injury to posterior aspect of left leg 1 month ago when he accidentally hit his leg against his bed. He reported the wound had not healed since. Reported he developed pain and swelling of left leg since 3 days, 9/10 in severity, with difficulty ambulating.   6/29/20, 11pm, patient has HD in am. Patient 's abdomen - distended. Bowel sounds active,  presented in all quadrants. He denied pain or any discomfort. Stated he had BM  (6/29). He was planed  for chest tube due to large pleural effusion as per pulmonary MD recommendation. Patient's son was calling.      OBJECTIVE:  Vital Signs Last 24 Hrs  T(C): 36.9 (29 Jun 2020 18:33), Max: 36.9 (29 Jun 2020 17:14)  T(F): 98.4 (29 Jun 2020 18:33), Max: 98.4 (29 Jun 2020 17:14)  HR: 114 (30 Jun 2020 00:39) (85 - 114)  BP: 104/69 (30 Jun 2020 00:39) (98/53 - 107/67)  BP(mean): --  RR: 19 (30 Jun 2020 00:39) (17 - 19)  SpO2: 93% (30 Jun 2020 00:39) (93% - 99%)    FOCUSED PHYSICAL EXAM:    LABS:                        12.8   10.32 )-----------( 124      ( 29 Jun 2020 06:13 )             40.5     06-29    128<L>  |  90<L>  |  55<H>  ----------------------------<  113<H>  4.5   |  25  |  10.20<H>    Ca    8.4      29 Jun 2020 06:13    PLAN: Spoke with son regarding patient's health condition.     FOLLOW UP / RESULT: Maintain safety and comfort.

## 2020-06-30 NOTE — PROGRESS NOTE ADULT - SUBJECTIVE AND OBJECTIVE BOX
HPI:  69M with CAD s/p multiple stents, s/p CABG (9/2019 course c/b Afib and LT pleural effusion), Diastolic CHF, HTN, HLD, DM2, Afib on coumadin and ESRD on HD (Tu, Thurs, Sat at [facility]), hyperthyroidism, presented to ER for pain and swelling of left leg since 3 days. Admitted to medicine for LLE cellulitis.  CT indicates large L pleural effusion.  Pulmonary recommends chest tube.  CT surgery to place chest tube s/p dialysis.    Patient examined at bedside, resting comfortably, denies SOB, pain or NVD.  NAD.    OVERNIGHT EVENTS:  No new overnight events     REVIEW OF SYSTEMS:      CONSTITUTIONAL: No fever,   EYES: no acute visual disturbances  NECK: No pain or stiffness  RESPIRATORY: No cough; No shortness of breath  CARDIOVASCULAR: No chest pain, no palpitations  GASTROINTESTINAL: No pain. No nausea, vomiting or diarrhea   NEUROLOGICAL: No headache or numbness, no tremors  MUSCULOSKELETAL: No joint pain, no muscle pain  GENITOURINARY: no dysuria, no frequency, no hesitancy  PSYCHIATRY: no depression , no anxiety  ALL OTHER  ROS negative        Vital Signs Last 24 Hrs  T(C): 37.2 (30 Jun 2020 07:59), Max: 37.2 (30 Jun 2020 07:59)  T(F): 99 (30 Jun 2020 07:59), Max: 99 (30 Jun 2020 07:59)  HR: 116 (30 Jun 2020 07:59) (91 - 116)  BP: 116/65 (30 Jun 2020 07:59) (98/53 - 116/65)  BP(mean): --  RR: 20 (30 Jun 2020 07:59) (17 - 20)  SpO2: 94% (30 Jun 2020 07:59) (93% - 99%)    ________________________________________________  PHYSICAL EXAM:    GENERAL: NAD  HEENT: Normocephalic; conjunctivae and sclerae clear; moist mucous membranes;   NECK : supple, no JVD  CHEST/LUNG: Clear to auscultation bilaterally   HEART: S1 S2  regular  ABDOMEN: Soft, Nontender, Nondistended; Bowel sounds present  EXTREMITIES: no cyanosis; no LE edema; no calf tenderness  SKIN: warm and dry; no rash  NERVOUS SYSTEM:  Alert & Oriented x3; no new deficits    _________________________________________________  CURRENT MEDICATIONS:    MEDICATIONS  (STANDING):  aMIOdarone    Tablet 200 milliGRAM(s) Oral daily  aspirin enteric coated 81 milliGRAM(s) Oral daily  calcium acetate 667 milliGRAM(s) Oral three times a day with meals  chlorhexidine 2% Cloths 1 Application(s) Topical daily  dextrose 5%. 1000 milliLiter(s) (50 mL/Hr) IV Continuous <Continuous>  gabapentin 100 milliGRAM(s) Oral two times a day  glycopyrrolate 9 MICROgram(s)/formoterol 4.8 MICROgram(s) Inhaler 2 Puff(s) Inhalation two times a day  insulin lispro (HumaLOG) corrective regimen sliding scale   SubCutaneous three times a day before meals  insulin lispro (HumaLOG) corrective regimen sliding scale   SubCutaneous at bedtime  ketotifen 0.025% Ophthalmic Solution 1 Drop(s) Both EYES two times a day  lidocaine 2% Gel 1 Application(s) Topical every 8 hours  methimazole 5 milliGRAM(s) Oral daily  metoprolol tartrate 100 milliGRAM(s) Oral two times a day  pantoprazole    Tablet 40 milliGRAM(s) Oral before breakfast  senna 2 Tablet(s) Oral at bedtime  simvastatin 40 milliGRAM(s) Oral at bedtime  sodium chloride 2 Gram(s) Oral every 6 hours  tiotropium 18 MICROgram(s) Capsule 1 Capsule(s) Inhalation daily  vancomycin  IVPB 1000 milliGRAM(s) IV Intermittent <User Schedule>    MEDICATIONS  (PRN):  acetaminophen   Tablet .. 650 milliGRAM(s) Oral every 6 hours PRN Moderate Pain (4 - 6)  ALBUTerol    90 MICROgram(s) HFA Inhaler 2 Puff(s) Inhalation every 6 hours PRN Shortness of Breath and/or Wheezing      __________________________________________________  LABS:                          13.1   14.79 )-----------( 139      ( 30 Jun 2020 09:03 )             41.7     06-30    127<L>  |  91<L>  |  78<H>  ----------------------------<  133<H>  5.4<H>   |  22  |  11.90<H>    Ca    9.0      30 Jun 2020 09:03      PT/INR - ( 30 Jun 2020 09:03 )   PT: 18.5 sec;   INR: 1.62 ratio         PTT - ( 30 Jun 2020 09:03 )  PTT:37.7 sec    CAPILLARY BLOOD GLUCOSE      POCT Blood Glucose.: 114 mg/dL (30 Jun 2020 07:36)  POCT Blood Glucose.: 152 mg/dL (29 Jun 2020 21:03)  POCT Blood Glucose.: 194 mg/dL (29 Jun 2020 16:58)  POCT Blood Glucose.: 161 mg/dL (29 Jun 2020 11:30)      __________________________________________________  RADIOLOGY & ADDITIONAL TESTS:    Imaging Personally Reviewed:  YES    < from: CT Chest No Cont (06.27.20 @ 09:21) >  IMPRESSION:     There is a large loculated left pleural effusion which is increased in size since the previous exam. There is associated compressive atelectasis.    < end of copied text >    Consultant(s) Notes Reviewed:   YES     Plan of care was discussed with patient and /or primary care giver; all questions and concerns were addressed and care was aligned with patient's wishes.    Plan discussed with attending and consulting physicians.

## 2020-06-30 NOTE — PROGRESS NOTE ADULT - ATTENDING COMMENTS
HPI:  69M with CAD s/p multiple stents, s/p CABG (9/2019 course c/b Afib and LT pleural effusion), Diastolic CHF, HTN, HLD, DM2, Afib on coumadin and ESRD on HD (Tu, Thurs, Sat at [facility]), hyperthyroidism, presented to ER for pain and swelling of left leg since 3 days. Patient reports sustaining injury to posterior aspect of left leg 1 month ago when he accidentally hit his leg against his bed. He reports the wound has not healed since. Reports he developed pain and swelling of left leg since 3 days, 9/10 in severity, with difficulty ambulating. Reports he was advised to get doppler which he reviewed with [facility] cardiologist today, was told he has no clot and recommended to come to ER. Denies fever, chills, weakness, cough, dysuria, NVDC. (18 Jun 2020 19:29)    - LEFT ARM SWELLING - F/U LEFT UPPER EXTREMITY ULTRASOUND  -  CELLULITIS OF LEFT LEG, DIABETIC LEFT FOOT ULCER  - IMPROVED ON VANCOMYCIN [PER ID RECOMMENDATION, SWITCHED FROM UNASYN+DOXY]. BLOOD  CXS NGTD. ID F/UP BY DR. DUEÑAS    - LOCULATED LEFT SIDED PLEURAL EFFUSION - PULMONOLOGY CONSULT PLACED. SURGERY CONSULT PLACED. CT-GUIDED DRAINAGE ATTEMPTED TODAY HOWEVER IT WAS UNSUCCESSFUL; SURGERY RECOMMENDS IR-GUIDED CHEST TUBE PLACEMENT [SON WAS INFORMED AND AGREES TO IR GUIDED CHEST TUBE PLACEMENT]  -  ESRD ON HD - NEPHROLOGY CONSULT IN PROGRESS  - DIASTOLIC CHF - TTE W/ EVIDENCE OF RIGHT/LEFT SYSTOLIC DYSFXN, PULMONARY HTN, MR, STRESS TEST WITHOUT NEW PERFUSION ABNORMALITIES; CARDIOLOGY CONSULT IN PROGRESS  - A/FIB ON COUMADIN - HOLD COUMADIN, PLACED ON LOVENOX [D/W NEPHROLOGIST BY NP]  -  PAD, DIABETIC PERIPHERAL NEUROPATHY - PAIN MGMT CONSULT IN PROGRESS  - CASE DISCUSSED EXTENSIVELY WITH SON [SYLVAIN RENTERIA ] AND PATIENT. DISCUSSED RISKS/BENEFITS OF CHEST TUBE PLACEMENT WITH SON, WHO AGREED TO PROCEED WITH INTERVENTION.  -  GI AND DVT PROPHYLAXIS    DONALD DIAZ M.D. COVERING EPHRAIM DIAZ M.D.

## 2020-06-30 NOTE — PROCEDURE NOTE - NSPROCDETAILS_GEN_ALL_CORE
location identified, draped/prepped, sterile technique used, needle inserted/introduced/Seldinger technique/connection to syringe

## 2020-06-30 NOTE — PROGRESS NOTE ADULT - ASSESSMENT
69M with large loculated Lt pleural effusion, stable  INR 1.43 yesterday evening    - Plan for left pigtail today   - DVT prophylaxis, venodynes  - OOB, Ambulating, encourage incentive spirometer

## 2020-06-30 NOTE — PROCEDURE NOTE - ADDITIONAL PROCEDURE DETAILS
Suture left in place at site of insertion to minimize bleeding. Site dressed with gauze and medipore.

## 2020-06-30 NOTE — PROGRESS NOTE ADULT - SUBJECTIVE AND OBJECTIVE BOX
Time of Visit:  Patient seen and examined.     MEDICATIONS  (STANDING):  aMIOdarone    Tablet 200 milliGRAM(s) Oral daily  aspirin enteric coated 81 milliGRAM(s) Oral daily  calcium acetate 667 milliGRAM(s) Oral three times a day with meals  chlorhexidine 2% Cloths 1 Application(s) Topical daily  dextrose 5%. 1000 milliLiter(s) (50 mL/Hr) IV Continuous <Continuous>  gabapentin 100 milliGRAM(s) Oral two times a day  glycopyrrolate 9 MICROgram(s)/formoterol 4.8 MICROgram(s) Inhaler 2 Puff(s) Inhalation two times a day  insulin lispro (HumaLOG) corrective regimen sliding scale   SubCutaneous three times a day before meals  insulin lispro (HumaLOG) corrective regimen sliding scale   SubCutaneous at bedtime  ketotifen 0.025% Ophthalmic Solution 1 Drop(s) Both EYES two times a day  lidocaine 1% Injectable 20 milliLiter(s) Local Injection once  lidocaine 2% Gel 1 Application(s) Topical every 8 hours  methimazole 5 milliGRAM(s) Oral daily  metoprolol tartrate 100 milliGRAM(s) Oral two times a day  pantoprazole    Tablet 40 milliGRAM(s) Oral before breakfast  senna 2 Tablet(s) Oral at bedtime  simvastatin 40 milliGRAM(s) Oral at bedtime  sodium chloride 2 Gram(s) Oral every 6 hours  tiotropium 18 MICROgram(s) Capsule 1 Capsule(s) Inhalation daily      MEDICATIONS  (PRN):  acetaminophen   Tablet .. 650 milliGRAM(s) Oral every 6 hours PRN Moderate Pain (4 - 6)  ALBUTerol    90 MICROgram(s) HFA Inhaler 2 Puff(s) Inhalation every 6 hours PRN Shortness of Breath and/or Wheezing       Medications up to date at time of exam.    ROS; No fever, chills, cough, congestion on exam.   PHYSICAL EXAMINATION:      Vital Signs Last 24 Hrs  T(C): 36.7 (30 Jun 2020 12:40), Max: 37.5 (30 Jun 2020 08:53)  T(F): 98 (30 Jun 2020 12:40), Max: 99.5 (30 Jun 2020 08:53)  HR: 124 (30 Jun 2020 12:40) (94 - 124)  BP: 108/65 (30 Jun 2020 12:40) (99/64 - 125/74)  BP(mean): --  RR: 24 (30 Jun 2020 12:40) (17 - 24)  SpO2: 97% (30 Jun 2020 12:40) (93% - 100%)   (if applicable)    General: Alert and oriented. No acute distress.     HEENT: Head is normocephalic and atraumatic. No nasal tenderness. Mucous membranes are moist.     NECK: Supple, no palpable adenopathy.    LUNGS: Decreased breath sounds to left Lung, otherwise clear with no wheezing, rales, or rhonchi. No use of accessory muscle.     HEART: S1 S2 Regular rate and no click/ rub.     ABDOMEN: Soft, nontender, and nondistended.  No abdominal guarding. Active bowel sounds .    EXTREMITIES: Without any cyanosis, clubbing, rash, lesions .    NEUROLOGIC: Awake, alert, oriented.     SKIN: Warm and moist. Non diaphoretic.       LABS:                        13.1   14.79 )-----------( 139      ( 30 Jun 2020 09:03 )             41.7     06-30    127<L>  |  91<L>  |  78<H>  ----------------------------<  133<H>  5.4<H>   |  22  |  11.90<H>    Ca    9.0      30 Jun 2020 09:03      PT/INR - ( 30 Jun 2020 09:03 )   PT: 18.5 sec;   INR: 1.62 ratio         PTT - ( 30 Jun 2020 09:03 )  PTT:37.7 sec                    MICROBIOLOGY: (if applicable)    RADIOLOGY & ADDITIONAL STUDIES:  EKG:   CXR:  ECHO:    IMPRESSION: 69y Male PAST MEDICAL & SURGICAL HISTORY:  ESRD (end stage renal disease) on dialysis: T-Th-Sat  Stented coronary artery: total 15 stents from [id number]  Hyperthyroidism  H/O: CVA: after cardiac stent placed 12/15/09 -no residual  Heart Attack: 2/1/07 with subsequent stent  Coronary Stent: [id number] 10 stents,  to Ramus 1/2015, cath 01/2016 ( see results in HPI)  Hypercholesterolemia  CAD (Coronary Artery Disease)  DM (Diabetes Mellitus): x 4 yrs without N/N/R  HTN (Hypertension)  S/P CABG x 4  Hemodialysis access, AV graft: 5/2015, L arm  S/P cataract extraction  Boil of Buttock: 2006 and 2008       Impression; 70 Y/O male presented to ER for pain and swelling of left leg since 3 days. Admitted to medicine for LLE cellulitis.  CXR shows gross heart enlargement and mild to moderate left base pleural pulmonary reaction again noted. Sternotomy again seen. Negative for DVT on Doppler study. Blood Cx Negative .  CT chest reveal enlarging left loculated pleural effusion. He will require chest tube placement but INR is elevated due to coumadin with latest INR 1.62 .     Suggestion;  O2 saturation 96% , on oxygen supplementation 2L NC.  Pulmonary oral hygiene care.  Continue PRN Albuterol 2 puffs Q 6 hours.  Continue Tiotropium.  Thoracic Sx team for chest tube placement when IR < 1.5 ( INR 1.62 today ).  Renal follow up due to on HD , had Dialysis today. Time of Visit:  Patient seen and examined.     MEDICATIONS  (STANDING):  aMIOdarone    Tablet 200 milliGRAM(s) Oral daily  aspirin enteric coated 81 milliGRAM(s) Oral daily  calcium acetate 667 milliGRAM(s) Oral three times a day with meals  chlorhexidine 2% Cloths 1 Application(s) Topical daily  dextrose 5%. 1000 milliLiter(s) (50 mL/Hr) IV Continuous <Continuous>  gabapentin 100 milliGRAM(s) Oral two times a day  glycopyrrolate 9 MICROgram(s)/formoterol 4.8 MICROgram(s) Inhaler 2 Puff(s) Inhalation two times a day  insulin lispro (HumaLOG) corrective regimen sliding scale   SubCutaneous three times a day before meals  insulin lispro (HumaLOG) corrective regimen sliding scale   SubCutaneous at bedtime  ketotifen 0.025% Ophthalmic Solution 1 Drop(s) Both EYES two times a day  lidocaine 1% Injectable 20 milliLiter(s) Local Injection once  lidocaine 2% Gel 1 Application(s) Topical every 8 hours  methimazole 5 milliGRAM(s) Oral daily  metoprolol tartrate 100 milliGRAM(s) Oral two times a day  pantoprazole    Tablet 40 milliGRAM(s) Oral before breakfast  senna 2 Tablet(s) Oral at bedtime  simvastatin 40 milliGRAM(s) Oral at bedtime  sodium chloride 2 Gram(s) Oral every 6 hours  tiotropium 18 MICROgram(s) Capsule 1 Capsule(s) Inhalation daily      MEDICATIONS  (PRN):  acetaminophen   Tablet .. 650 milliGRAM(s) Oral every 6 hours PRN Moderate Pain (4 - 6)  ALBUTerol    90 MICROgram(s) HFA Inhaler 2 Puff(s) Inhalation every 6 hours PRN Shortness of Breath and/or Wheezing       Medications up to date at time of exam.    ROS; No fever, chills, cough, congestion on exam.   PHYSICAL EXAMINATION:      Vital Signs Last 24 Hrs  T(C): 36.7 (30 Jun 2020 12:40), Max: 37.5 (30 Jun 2020 08:53)  T(F): 98 (30 Jun 2020 12:40), Max: 99.5 (30 Jun 2020 08:53)  HR: 124 (30 Jun 2020 12:40) (94 - 124)  BP: 108/65 (30 Jun 2020 12:40) (99/64 - 125/74)  BP(mean): --  RR: 24 (30 Jun 2020 12:40) (17 - 24)  SpO2: 97% (30 Jun 2020 12:40) (93% - 100%)   (if applicable)    General: Alert and oriented. No acute distress.     HEENT: Head is normocephalic and atraumatic. No nasal tenderness. Mucous membranes are moist.     NECK: Supple, no palpable adenopathy.    LUNGS: Decreased breath sounds to left Lung, otherwise clear with no wheezing, rales, or rhonchi. No use of accessory muscle.     HEART: S1 S2 Regular rate and no click/ rub.     ABDOMEN: Soft, nontender, and nondistended.  No abdominal guarding. Active bowel sounds .    EXTREMITIES: Without any cyanosis, clubbing, rash, lesions .    NEUROLOGIC: Awake, alert, oriented.     SKIN: Warm and moist. Non diaphoretic.       LABS:                        13.1   14.79 )-----------( 139      ( 30 Jun 2020 09:03 )             41.7     06-30    127<L>  |  91<L>  |  78<H>  ----------------------------<  133<H>  5.4<H>   |  22  |  11.90<H>    Ca    9.0      30 Jun 2020 09:03      PT/INR - ( 30 Jun 2020 09:03 )   PT: 18.5 sec;   INR: 1.62 ratio         PTT - ( 30 Jun 2020 09:03 )  PTT:37.7 sec                    MICROBIOLOGY: (if applicable)    RADIOLOGY & ADDITIONAL STUDIES:  EKG:   CXR:  ECHO:    IMPRESSION: 69y Male PAST MEDICAL & SURGICAL HISTORY:  ESRD (end stage renal disease) on dialysis: T-Th-Sat  Stented coronary artery: total 15 stents from [id number]  Hyperthyroidism  H/O: CVA: after cardiac stent placed 12/15/09 -no residual  Heart Attack: 2/1/07 with subsequent stent  Coronary Stent: [id number] 10 stents,  to Ramus 1/2015, cath 01/2016 ( see results in HPI)  Hypercholesterolemia  CAD (Coronary Artery Disease)  DM (Diabetes Mellitus): x 4 yrs without N/N/R  HTN (Hypertension)  S/P CABG x 4  Hemodialysis access, AV graft: 5/2015, L arm  S/P cataract extraction  Boil of Buttock: 2006 and 2008       Impression; 68 Y/O male presented to ER for pain and swelling of left leg since 3 days. Admitted to medicine for LLE cellulitis.  CXR shows gross heart enlargement and mild to moderate left base pleural pulmonary reaction again noted. Sternotomy again seen. Negative for DVT on Doppler study. Blood Cx Negative .  CT chest reveal enlarging left loculated pleural effusion. He will require chest tube placement but INR is elevated due to coumadin with latest INR 1.62 .     Suggestion;  O2 saturation 96% , on oxygen supplementation 2L NC.  Pulmonary oral hygiene care.  Continue PRN Albuterol 2 puffs Q 6 hours.  Continue Tiotropium.  Thoracic Sx team for chest tube placement when IR < 1.5 ( INR 1.62 today ).  Renal follow up due to on HD , had Dialysis today.        Agree with above assessment and plan as transcribed.

## 2020-06-30 NOTE — CHART NOTE - NSCHARTNOTEFT_GEN_A_CORE
EVENT:  Patient needs IR placed chest tube      HPI:  69M with CAD s/p multiple stents, s/p CABG (9/2019 course c/b Afib and LT pleural effusion), Diastolic CHF, HTN, HLD, DM2, Afib on coumadin and ESRD on HD (Tu, Thurs, Sat at [facility]), hyperthyroidism, presented to ER for pain and swelling of left leg since 3 days. Patient reports sustaining injury to posterior aspect of left leg 1 month ago when he accidentally hit his leg against his bed. He reports the wound has not healed since. Reports he developed pain and swelling of left leg since 3 days, 9/10 in severity, with difficulty ambulating. Reports he was advised to get doppler which he reviewed with hic cardiologist today, was told he has no clot and recommended to come to ER. Denies fever, chills, weakness, cough, dysuria, NVDC. (18 Jun 2020 19:29)        OBJECTIVE:  Vital Signs Last 24 Hrs  T(C): 36.3 (30 Jun 2020 16:07), Max: 37.5 (30 Jun 2020 08:53)  T(F): 97.3 (30 Jun 2020 16:07), Max: 99.5 (30 Jun 2020 08:53)  HR: 125 (30 Jun 2020 16:07) (94 - 125)  BP: 120/75 (30 Jun 2020 16:07) (99/64 - 125/74)  BP(mean): --  RR: 22 (30 Jun 2020 16:07) (18 - 24)  SpO2: 100% (30 Jun 2020 16:07) (93% - 100%)    LABS:                        13.1   14.79 )-----------( 139      ( 30 Jun 2020 09:03 )             41.7     06-30    127<L>  |  91<L>  |  78<H>  ----------------------------<  133<H>  5.4<H>   |  22  |  11.90<H>    Ca    9.0      30 Jun 2020 09:03        ASSESSMENT:  Problem:  CT surgery unable to place chest tube.  Patient on Coumadin for a-fib.       PLAN:  Give 1 renal dose of Lovenox      FOLLOW UP/RESULTS:  IR for chest tube placement

## 2020-07-01 DIAGNOSIS — E44.0 MODERATE PROTEIN-CALORIE MALNUTRITION: ICD-10-CM

## 2020-07-01 DIAGNOSIS — Z51.5 ENCOUNTER FOR PALLIATIVE CARE: ICD-10-CM

## 2020-07-01 LAB
ANION GAP SERPL CALC-SCNC: 11 MMOL/L — SIGNIFICANT CHANGE UP (ref 5–17)
APTT BLD: 33.9 SEC — SIGNIFICANT CHANGE UP (ref 27.5–35.5)
APTT BLD: >200 SEC — CRITICAL HIGH (ref 27.5–35.5)
BUN SERPL-MCNC: 55 MG/DL — HIGH (ref 7–18)
CALCIUM SERPL-MCNC: 8.5 MG/DL — SIGNIFICANT CHANGE UP (ref 8.4–10.5)
CHLORIDE SERPL-SCNC: 97 MMOL/L — SIGNIFICANT CHANGE UP (ref 96–108)
CO2 SERPL-SCNC: 27 MMOL/L — SIGNIFICANT CHANGE UP (ref 22–31)
CREAT SERPL-MCNC: 9.21 MG/DL — HIGH (ref 0.5–1.3)
GLUCOSE BLDC GLUCOMTR-MCNC: 143 MG/DL — HIGH (ref 70–99)
GLUCOSE BLDC GLUCOMTR-MCNC: 152 MG/DL — HIGH (ref 70–99)
GLUCOSE BLDC GLUCOMTR-MCNC: 182 MG/DL — HIGH (ref 70–99)
GLUCOSE BLDC GLUCOMTR-MCNC: 212 MG/DL — HIGH (ref 70–99)
GLUCOSE BLDC GLUCOMTR-MCNC: 228 MG/DL — HIGH (ref 70–99)
GLUCOSE SERPL-MCNC: 143 MG/DL — HIGH (ref 70–99)
HCT VFR BLD CALC: 38.8 % — LOW (ref 39–50)
HCT VFR BLD CALC: 40.5 % — SIGNIFICANT CHANGE UP (ref 39–50)
HGB BLD-MCNC: 11.8 G/DL — LOW (ref 13–17)
HGB BLD-MCNC: 12.2 G/DL — LOW (ref 13–17)
INR BLD: 1.62 RATIO — HIGH (ref 0.88–1.16)
INR BLD: 1.72 RATIO — HIGH (ref 0.88–1.16)
MCHC RBC-ENTMCNC: 28.9 PG — SIGNIFICANT CHANGE UP (ref 27–34)
MCHC RBC-ENTMCNC: 29.1 PG — SIGNIFICANT CHANGE UP (ref 27–34)
MCHC RBC-ENTMCNC: 30.1 GM/DL — LOW (ref 32–36)
MCHC RBC-ENTMCNC: 30.4 GM/DL — LOW (ref 32–36)
MCV RBC AUTO: 95.8 FL — SIGNIFICANT CHANGE UP (ref 80–100)
MCV RBC AUTO: 96 FL — SIGNIFICANT CHANGE UP (ref 80–100)
NIGHT BLUE STAIN TISS: SIGNIFICANT CHANGE UP
NRBC # BLD: 0 /100 WBCS — SIGNIFICANT CHANGE UP (ref 0–0)
NRBC # BLD: 0 /100 WBCS — SIGNIFICANT CHANGE UP (ref 0–0)
PLATELET # BLD AUTO: 128 K/UL — LOW (ref 150–400)
PLATELET # BLD AUTO: 141 K/UL — LOW (ref 150–400)
POTASSIUM SERPL-MCNC: 4.4 MMOL/L — SIGNIFICANT CHANGE UP (ref 3.5–5.3)
POTASSIUM SERPL-SCNC: 4.4 MMOL/L — SIGNIFICANT CHANGE UP (ref 3.5–5.3)
PROTHROM AB SERPL-ACNC: 18.5 SEC — HIGH (ref 10.6–13.6)
PROTHROM AB SERPL-ACNC: 19.6 SEC — HIGH (ref 10.6–13.6)
RBC # BLD: 4.05 M/UL — LOW (ref 4.2–5.8)
RBC # BLD: 4.22 M/UL — SIGNIFICANT CHANGE UP (ref 4.2–5.8)
RBC # FLD: 15.9 % — HIGH (ref 10.3–14.5)
RBC # FLD: 16 % — HIGH (ref 10.3–14.5)
SODIUM SERPL-SCNC: 135 MMOL/L — SIGNIFICANT CHANGE UP (ref 135–145)
SPECIMEN SOURCE: SIGNIFICANT CHANGE UP
WBC # BLD: 9.2 K/UL — SIGNIFICANT CHANGE UP (ref 3.8–10.5)
WBC # BLD: 9.57 K/UL — SIGNIFICANT CHANGE UP (ref 3.8–10.5)
WBC # FLD AUTO: 9.2 K/UL — SIGNIFICANT CHANGE UP (ref 3.8–10.5)
WBC # FLD AUTO: 9.57 K/UL — SIGNIFICANT CHANGE UP (ref 3.8–10.5)

## 2020-07-01 PROCEDURE — 99231 SBSQ HOSP IP/OBS SF/LOW 25: CPT

## 2020-07-01 PROCEDURE — 99223 1ST HOSP IP/OBS HIGH 75: CPT

## 2020-07-01 RX ORDER — HEPARIN SODIUM 5000 [USP'U]/ML
INJECTION INTRAVENOUS; SUBCUTANEOUS
Qty: 25000 | Refills: 0 | Status: DISCONTINUED | OUTPATIENT
Start: 2020-07-01 | End: 2020-07-03

## 2020-07-01 RX ORDER — HEPARIN SODIUM 5000 [USP'U]/ML
6500 INJECTION INTRAVENOUS; SUBCUTANEOUS EVERY 6 HOURS
Refills: 0 | Status: DISCONTINUED | OUTPATIENT
Start: 2020-07-01 | End: 2020-07-03

## 2020-07-01 RX ORDER — VANCOMYCIN HCL 1 G
1000 VIAL (EA) INTRAVENOUS ONCE
Refills: 0 | Status: COMPLETED | OUTPATIENT
Start: 2020-07-02 | End: 2020-07-02

## 2020-07-01 RX ORDER — HEPARIN SODIUM 5000 [USP'U]/ML
6500 INJECTION INTRAVENOUS; SUBCUTANEOUS ONCE
Refills: 0 | Status: COMPLETED | OUTPATIENT
Start: 2020-07-01 | End: 2020-07-01

## 2020-07-01 RX ORDER — HEPARIN SODIUM 5000 [USP'U]/ML
3000 INJECTION INTRAVENOUS; SUBCUTANEOUS EVERY 6 HOURS
Refills: 0 | Status: DISCONTINUED | OUTPATIENT
Start: 2020-07-01 | End: 2020-07-03

## 2020-07-01 RX ADMIN — GLYCOPYRROLATE AND FORMOTEROL FUMARATE 2 PUFF(S): 9; 4.8 AEROSOL, METERED RESPIRATORY (INHALATION) at 11:16

## 2020-07-01 RX ADMIN — SIMVASTATIN 40 MILLIGRAM(S): 20 TABLET, FILM COATED ORAL at 21:49

## 2020-07-01 RX ADMIN — Medication 2: at 17:27

## 2020-07-01 RX ADMIN — Medication 81 MILLIGRAM(S): at 15:52

## 2020-07-01 RX ADMIN — HEPARIN SODIUM 0 UNIT(S)/HR: 5000 INJECTION INTRAVENOUS; SUBCUTANEOUS at 20:38

## 2020-07-01 RX ADMIN — HEPARIN SODIUM 1500 UNIT(S)/HR: 5000 INJECTION INTRAVENOUS; SUBCUTANEOUS at 12:19

## 2020-07-01 RX ADMIN — KETOTIFEN FUMARATE 1 DROP(S): 0.34 SOLUTION OPHTHALMIC at 05:38

## 2020-07-01 RX ADMIN — LIDOCAINE 1 APPLICATION(S): 4 CREAM TOPICAL at 21:49

## 2020-07-01 RX ADMIN — LIDOCAINE 1 APPLICATION(S): 4 CREAM TOPICAL at 05:38

## 2020-07-01 RX ADMIN — Medication 100 MILLIGRAM(S): at 05:38

## 2020-07-01 RX ADMIN — HEPARIN SODIUM 1200 UNIT(S)/HR: 5000 INJECTION INTRAVENOUS; SUBCUTANEOUS at 21:50

## 2020-07-01 RX ADMIN — Medication 667 MILLIGRAM(S): at 11:13

## 2020-07-01 RX ADMIN — AMIODARONE HYDROCHLORIDE 200 MILLIGRAM(S): 400 TABLET ORAL at 05:37

## 2020-07-01 RX ADMIN — HEPARIN SODIUM 6500 UNIT(S): 5000 INJECTION INTRAVENOUS; SUBCUTANEOUS at 12:18

## 2020-07-01 RX ADMIN — Medication 667 MILLIGRAM(S): at 17:29

## 2020-07-01 RX ADMIN — KETOTIFEN FUMARATE 1 DROP(S): 0.34 SOLUTION OPHTHALMIC at 17:30

## 2020-07-01 RX ADMIN — GLYCOPYRROLATE AND FORMOTEROL FUMARATE 2 PUFF(S): 9; 4.8 AEROSOL, METERED RESPIRATORY (INHALATION) at 21:49

## 2020-07-01 RX ADMIN — Medication 100 MILLIGRAM(S): at 17:29

## 2020-07-01 NOTE — CONSULT NOTE ADULT - PROBLEM SELECTOR RECOMMENDATION 5
Pt was AOx1, at the time of exam.  Spoke with his son Randal, palliative care's role in symptom management, identification of goals of care, advance care planning, and support discussed.  Address the role of HCP he stated his mother was assigned as his father HCP however, given his mother has Parkinson's dementia he is the designated surrogate.  Discussed the pt's clinical status and goals of care.  He has a very astute knowledge and understanding  of his father's medical history.    Regarding code status, discussed that CPR and/or intubation in context of advanced illness would be unlikely to benefit  or improve his quality of life. Randal stated he had a family discussion and they are in agreement and would prefer to allow for natural death if his heart stops or he stops breathing. LAURIE drafted per family's wishes for DNR/DNI.   All questions answered.  Support provided.

## 2020-07-01 NOTE — CONSULT NOTE ADULT - PROBLEM SELECTOR RECOMMENDATION 3
2/2 comorbidities, HF and ESRD. Per family pt has good days and bad days with his appetite.  Albumin 3.1.  Diet as tolerated. Supportive counseling provided including discussion of providing nutrient dense, high protein food in small meals throughout the day.   Nutrition eval noted. Diagnosed with ESRD and started HD in 2015.  Family reports pt has not been tolerating HD very well, with poor quality of life. They want to continue HD.  Nephrology following    Avoid NSAIDs

## 2020-07-01 NOTE — PROGRESS NOTE ADULT - PROBLEM SELECTOR PLAN 1
Improving  Afebrile  WBC 14.79  Continue vanco with HD   Dopplers negative   Pain management following, avoid opiates per family   Dr. Rayo (ID) following Improving  Afebrile  WBC 9.2  Continue vanco with HD   Dopplers negative   Pain management following, avoid opiates per family   Dr. Rayo (ID) following

## 2020-07-01 NOTE — PROGRESS NOTE ADULT - SUBJECTIVE AND OBJECTIVE BOX
NP Note discussed with  primary attending    69M with CAD s/p multiple stents, s/p CABG (9/2019 course c/b Afib and LT pleural effusion), Diastolic CHF, HTN, HLD, DM2, Afib on coumadin and ESRD on HD (Tu, Thurs, Sat at [facility]), hyperthyroidism, presented to ER for pain and swelling of left leg since 3 days. Admitted to medicine for LLE cellulitis ABX  CT indicates large L pleural effusion.  Pulmonary recommends chest tube.  CT surgery to place chest tube s/p dialysis.    INTERVAL HPI/OVERNIGHT EVENTS: no new complaints    MEDICATIONS  (STANDING):  acetaminophen  IVPB .. 1000 milliGRAM(s) IV Intermittent once  aMIOdarone    Tablet 200 milliGRAM(s) Oral daily  aspirin enteric coated 81 milliGRAM(s) Oral daily  calcium acetate 667 milliGRAM(s) Oral three times a day with meals  chlorhexidine 2% Cloths 1 Application(s) Topical daily  dextrose 5%. 1000 milliLiter(s) (50 mL/Hr) IV Continuous <Continuous>  glycopyrrolate 9 MICROgram(s)/formoterol 4.8 MICROgram(s) Inhaler 2 Puff(s) Inhalation two times a day  heparin  Infusion.  Unit(s)/Hr (15 mL/Hr) IV Continuous <Continuous>  insulin lispro (HumaLOG) corrective regimen sliding scale   SubCutaneous three times a day before meals  insulin lispro (HumaLOG) corrective regimen sliding scale   SubCutaneous at bedtime  ketotifen 0.025% Ophthalmic Solution 1 Drop(s) Both EYES two times a day  lidocaine 1% Injectable 20 milliLiter(s) Local Injection once  lidocaine 2% Gel 1 Application(s) Topical every 8 hours  methimazole 5 milliGRAM(s) Oral daily  metoprolol tartrate 100 milliGRAM(s) Oral two times a day  pantoprazole    Tablet 40 milliGRAM(s) Oral before breakfast  senna 2 Tablet(s) Oral at bedtime  simvastatin 40 milliGRAM(s) Oral at bedtime  tiotropium 18 MICROgram(s) Capsule 1 Capsule(s) Inhalation daily    MEDICATIONS  (PRN):  acetaminophen   Tablet .. 650 milliGRAM(s) Oral every 6 hours PRN Moderate Pain (4 - 6)  ALBUTerol    90 MICROgram(s) HFA Inhaler 2 Puff(s) Inhalation every 6 hours PRN Shortness of Breath and/or Wheezing  heparin   Injectable 6500 Unit(s) IV Push every 6 hours PRN For aPTT less than 40  heparin   Injectable 3000 Unit(s) IV Push every 6 hours PRN For aPTT between 40 - 57      __________________________________________________  REVIEW OF SYSTEMS:    CONSTITUTIONAL: No fever,   EYES: no acute visual disturbances  NECK: No pain or stiffness  RESPIRATORY: No cough; No shortness of breath  CARDIOVASCULAR: No chest pain, no palpitations  GASTROINTESTINAL: No pain. No nausea or vomiting; No diarrhea   NEUROLOGICAL: No headache or numbness, no tremors  MUSCULOSKELETAL: No joint pain, no muscle pain  GENITOURINARY: no dysuria, no frequency, no hesitancy  PSYCHIATRY: no depression , no anxiety  ALL OTHER  ROS negative        Vital Signs Last 24 Hrs  T(C): 36.7 (01 Jul 2020 15:49), Max: 36.8 (01 Jul 2020 00:16)  T(F): 98 (01 Jul 2020 15:49), Max: 98.2 (01 Jul 2020 00:16)  HR: 114 (01 Jul 2020 15:49) (87 - 127)  BP: 99/63 (01 Jul 2020 15:49) (99/63 - 118/75)  BP(mean): --  RR: 22 (01 Jul 2020 15:49) (20 - 24)  SpO2: 94% (01 Jul 2020 15:50) (70% - 98%)    ________________________________________________  PHYSICAL EXAM:  GENERAL: NAD  HEENT: Normocephalic;  conjunctivae and sclerae clear; moist mucous membranes;   NECK : supple  CHEST/LUNG: Clear to ausculitation bilaterally with good air entry   HEART: S1 S2  regular; no murmurs, gallops or rubs  ABDOMEN: Soft, Nontender, Nondistended; Bowel sounds present  EXTREMITIES: no cyanosis; no edema; no calf tenderness  SKIN: warm and dry; no rash  NERVOUS SYSTEM:  Awake and alert; Oriented  to place, person and time ; no new deficits    _________________________________________________  LABS:                        11.8   9.20  )-----------( 128      ( 01 Jul 2020 06:11 )             38.8     07-01    135  |  97  |  55<H>  ----------------------------<  143<H>  4.4   |  27  |  9.21<H>    Ca    8.5      01 Jul 2020 06:11      PT/INR - ( 01 Jul 2020 06:11 )   PT: 18.5 sec;   INR: 1.62 ratio         PTT - ( 01 Jul 2020 06:11 )  PTT:33.9 sec    CAPILLARY BLOOD GLUCOSE      POCT Blood Glucose.: 228 mg/dL (01 Jul 2020 16:41)  POCT Blood Glucose.: 152 mg/dL (01 Jul 2020 11:49)  POCT Blood Glucose.: 143 mg/dL (01 Jul 2020 06:42)  POCT Blood Glucose.: 212 mg/dL (01 Jul 2020 00:25)  POCT Blood Glucose.: 180 mg/dL (30 Jun 2020 21:08)  POCT Blood Glucose.: 132 mg/dL (30 Jun 2020 17:02)        RADIOLOGY & ADDITIONAL TESTS:    Imaging Personally Reviewed:  YES/NO    Consultant(s) Notes Reviewed:   YES/ No    Care Discussed with Consultants :     Plan of care was discussed with patient and /or primary care giver; all questions and concerns were addressed and care was aligned with patient's wishes. NP Note discussed with  primary attending    69M with CAD s/p multiple stents, s/p CABG (9/2019 course c/b Afib and LT pleural effusion), Diastolic CHF, HTN, HLD, DM2, Afib on coumadin and ESRD on HD (Tu, Thurs, Sat at [facility]), hyperthyroidism, presented to ER for pain and swelling of left leg since 3 days. Admitted to medicine for LLE cellulitis Vanco during dialysis. CT found large L loculated pleural effusion, recommending chest tube. Bedside thoracentesis drained 150cc of blood tinged pleural fluid. Specimen sent for pathology and culture. Attempt to place left pig tail catheter by Dr. Torres unsuccessful. Plan for IR in the AM tomorrow for placement. Started on heparin drip for subtherapeutic INR, NPO after midnight for procedure.     INTERVAL HPI/OVERNIGHT EVENTS: no new complaints    MEDICATIONS  (STANDING):  acetaminophen  IVPB .. 1000 milliGRAM(s) IV Intermittent once  aMIOdarone    Tablet 200 milliGRAM(s) Oral daily  aspirin enteric coated 81 milliGRAM(s) Oral daily  calcium acetate 667 milliGRAM(s) Oral three times a day with meals  chlorhexidine 2% Cloths 1 Application(s) Topical daily  dextrose 5%. 1000 milliLiter(s) (50 mL/Hr) IV Continuous <Continuous>  glycopyrrolate 9 MICROgram(s)/formoterol 4.8 MICROgram(s) Inhaler 2 Puff(s) Inhalation two times a day  heparin  Infusion.  Unit(s)/Hr (15 mL/Hr) IV Continuous <Continuous>  insulin lispro (HumaLOG) corrective regimen sliding scale   SubCutaneous three times a day before meals  insulin lispro (HumaLOG) corrective regimen sliding scale   SubCutaneous at bedtime  ketotifen 0.025% Ophthalmic Solution 1 Drop(s) Both EYES two times a day  lidocaine 1% Injectable 20 milliLiter(s) Local Injection once  lidocaine 2% Gel 1 Application(s) Topical every 8 hours  methimazole 5 milliGRAM(s) Oral daily  metoprolol tartrate 100 milliGRAM(s) Oral two times a day  pantoprazole    Tablet 40 milliGRAM(s) Oral before breakfast  senna 2 Tablet(s) Oral at bedtime  simvastatin 40 milliGRAM(s) Oral at bedtime  tiotropium 18 MICROgram(s) Capsule 1 Capsule(s) Inhalation daily    MEDICATIONS  (PRN):  acetaminophen   Tablet .. 650 milliGRAM(s) Oral every 6 hours PRN Moderate Pain (4 - 6)  ALBUTerol    90 MICROgram(s) HFA Inhaler 2 Puff(s) Inhalation every 6 hours PRN Shortness of Breath and/or Wheezing  heparin   Injectable 6500 Unit(s) IV Push every 6 hours PRN For aPTT less than 40  heparin   Injectable 3000 Unit(s) IV Push every 6 hours PRN For aPTT between 40 - 57      __________________________________________________  REVIEW OF SYSTEMS:    CONSTITUTIONAL: No fever,   EYES: subjective complaints of "unable to see" exam reveals vision is normal   NECK: No pain or stiffness  RESPIRATORY: No cough; +shortness of breath  CARDIOVASCULAR: No chest pain, no palpitations  GASTROINTESTINAL: No pain. No nausea or vomiting; No diarrhea   NEUROLOGICAL: No headache or numbness, no tremors  MUSCULOSKELETAL: No joint pain, no muscle pain  GENITOURINARY: no dysuria, no frequency, no hesitancy  PSYCHIATRY: no depression , no anxiety  ALL OTHER  ROS negative        Vital Signs Last 24 Hrs  T(C): 36.7 (01 Jul 2020 15:49), Max: 36.8 (01 Jul 2020 00:16)  T(F): 98 (01 Jul 2020 15:49), Max: 98.2 (01 Jul 2020 00:16)  HR: 114 (01 Jul 2020 15:49) (87 - 127)  BP: 99/63 (01 Jul 2020 15:49) (99/63 - 118/75)  BP(mean): --  RR: 22 (01 Jul 2020 15:49) (20 - 24)  SpO2: 94% (01 Jul 2020 15:50) (70% - 98%)    ________________________________________________  PHYSICAL EXAM:  GENERAL: NAD  HEENT: Normocephalic;  conjunctivae and sclerae clear;   NECK : supple  CHEST/LUNG: LLL decrease lung sounds, +left sided dullness  HEART: S1 S2  regular; no murmurs, gallops or rubs  ABDOMEN: Soft, Nontender, distended; Bowel sounds present  EXTREMITIES: no cyanosis; no edema; no calf tenderness  SKIN: warm and dry; no rash  NERVOUS SYSTEM:  Awake and alert; disoriented at baseline    _________________________________________________  LABS:                        11.8   9.20  )-----------( 128      ( 01 Jul 2020 06:11 )             38.8     07-01    135  |  97  |  55<H>  ----------------------------<  143<H>  4.4   |  27  |  9.21<H>    Ca    8.5      01 Jul 2020 06:11      PT/INR - ( 01 Jul 2020 06:11 )   PT: 18.5 sec;   INR: 1.62 ratio         PTT - ( 01 Jul 2020 06:11 )  PTT:33.9 sec    CAPILLARY BLOOD GLUCOSE      POCT Blood Glucose.: 228 mg/dL (01 Jul 2020 16:41)  POCT Blood Glucose.: 152 mg/dL (01 Jul 2020 11:49)  POCT Blood Glucose.: 143 mg/dL (01 Jul 2020 06:42)  POCT Blood Glucose.: 212 mg/dL (01 Jul 2020 00:25)  POCT Blood Glucose.: 180 mg/dL (30 Jun 2020 21:08)  POCT Blood Glucose.: 132 mg/dL (30 Jun 2020 17:02)        RADIOLOGY & ADDITIONAL TESTS:    Consultant(s) Notes Reviewed:   YES    Plan of care was discussed with patient and /or primary care giver; all questions and concerns were addressed and care was aligned with patient's wishes. NP Note discussed with  primary attending    69M with CAD s/p multiple stents, s/p CABG (9/2019 course c/b Afib and LT pleural effusion), Diastolic CHF, HTN, HLD, DM2, Afib on coumadin and ESRD on HD (Tu, Thurs, Sat at [facility]), hyperthyroidism, presented to ER for pain and swelling of left leg since 3 days. Admitted to medicine for LLE cellulitis Vanco during dialysis. CT found large L loculated pleural effusion, recommending chest tube. Bedside thoracentesis drained 150cc of blood tinged pleural fluid. Specimen sent for pathology and culture. Attempt to place left pig tail catheter by Dr. Torres unsuccessful. Plan for IR in the AM tomorrow for placement. Started on heparin drip for subtherapeutic INR, NPO after midnight for procedure.     INTERVAL HPI/OVERNIGHT EVENTS: no new complaints    MEDICATIONS  (STANDING):  acetaminophen  IVPB .. 1000 milliGRAM(s) IV Intermittent once  aMIOdarone    Tablet 200 milliGRAM(s) Oral daily  aspirin enteric coated 81 milliGRAM(s) Oral daily  calcium acetate 667 milliGRAM(s) Oral three times a day with meals  chlorhexidine 2% Cloths 1 Application(s) Topical daily  dextrose 5%. 1000 milliLiter(s) (50 mL/Hr) IV Continuous <Continuous>  glycopyrrolate 9 MICROgram(s)/formoterol 4.8 MICROgram(s) Inhaler 2 Puff(s) Inhalation two times a day  heparin  Infusion.  Unit(s)/Hr (15 mL/Hr) IV Continuous <Continuous>  insulin lispro (HumaLOG) corrective regimen sliding scale   SubCutaneous three times a day before meals  insulin lispro (HumaLOG) corrective regimen sliding scale   SubCutaneous at bedtime  ketotifen 0.025% Ophthalmic Solution 1 Drop(s) Both EYES two times a day  lidocaine 1% Injectable 20 milliLiter(s) Local Injection once  lidocaine 2% Gel 1 Application(s) Topical every 8 hours  methimazole 5 milliGRAM(s) Oral daily  metoprolol tartrate 100 milliGRAM(s) Oral two times a day  pantoprazole    Tablet 40 milliGRAM(s) Oral before breakfast  senna 2 Tablet(s) Oral at bedtime  simvastatin 40 milliGRAM(s) Oral at bedtime  tiotropium 18 MICROgram(s) Capsule 1 Capsule(s) Inhalation daily    MEDICATIONS  (PRN):  acetaminophen   Tablet .. 650 milliGRAM(s) Oral every 6 hours PRN Moderate Pain (4 - 6)  ALBUTerol    90 MICROgram(s) HFA Inhaler 2 Puff(s) Inhalation every 6 hours PRN Shortness of Breath and/or Wheezing  heparin   Injectable 6500 Unit(s) IV Push every 6 hours PRN For aPTT less than 40  heparin   Injectable 3000 Unit(s) IV Push every 6 hours PRN For aPTT between 40 - 57      __________________________________________________  REVIEW OF SYSTEMS: unable to participate secondary to mental status     Vital Signs Last 24 Hrs  T(C): 36.7 (01 Jul 2020 15:49), Max: 36.8 (01 Jul 2020 00:16)  T(F): 98 (01 Jul 2020 15:49), Max: 98.2 (01 Jul 2020 00:16)  HR: 114 (01 Jul 2020 15:49) (87 - 127)  BP: 99/63 (01 Jul 2020 15:49) (99/63 - 118/75)  BP(mean): --  RR: 22 (01 Jul 2020 15:49) (20 - 24)  SpO2: 94% (01 Jul 2020 15:50) (70% - 98%)    ________________________________________________  PHYSICAL EXAM:  GENERAL: Chronically ill appearing, Somnlent- NAD  HEENT: Normocephalic;  conjunctivae and sclerae clear;   NECK : supple  CHEST/LUNG: LLL decrease lung sounds, +left sided dullness  HEART: S1 S2  regular; no murmurs, gallops or rubs  ABDOMEN: Soft, Nontender, distended; Bowel sounds present  EXTREMITIES: no cyanosis; no edema; no calf tenderness  SKIN: warm and dry; no rash  NERVOUS SYSTEM: Somnolent arouses awake and alert; disoriented at baseline    _________________________________________________  LABS:                        11.8   9.20  )-----------( 128      ( 01 Jul 2020 06:11 )             38.8     07-01    135  |  97  |  55<H>  ----------------------------<  143<H>  4.4   |  27  |  9.21<H>    Ca    8.5      01 Jul 2020 06:11      PT/INR - ( 01 Jul 2020 06:11 )   PT: 18.5 sec;   INR: 1.62 ratio         PTT - ( 01 Jul 2020 06:11 )  PTT:33.9 sec    CAPILLARY BLOOD GLUCOSE      POCT Blood Glucose.: 228 mg/dL (01 Jul 2020 16:41)  POCT Blood Glucose.: 152 mg/dL (01 Jul 2020 11:49)  POCT Blood Glucose.: 143 mg/dL (01 Jul 2020 06:42)  POCT Blood Glucose.: 212 mg/dL (01 Jul 2020 00:25)  POCT Blood Glucose.: 180 mg/dL (30 Jun 2020 21:08)  POCT Blood Glucose.: 132 mg/dL (30 Jun 2020 17:02)        RADIOLOGY & ADDITIONAL TESTS:    Consultant(s) Notes Reviewed:   YES    Plan of care was discussed with patient and /or primary care giver; all questions and concerns were addressed and care was aligned with patient's wishes.

## 2020-07-01 NOTE — PROGRESS NOTE ADULT - PROBLEM SELECTOR PLAN 7
Uncontrolled as evidenced by A1c 7.1   Pt takes Humalog & Lantus at home  Continue with sliding scale insulin  Continue with glucose monitoring  Continue with diabetic diet A1c 7.1   Continue with sliding scale insulin  Continue with glucose monitoring  Continue with diabetic diet

## 2020-07-01 NOTE — CONSULT NOTE ADULT - PROBLEM SELECTOR RECOMMENDATION 2
Diagnosed with ESRD and started HD in 2015.  Family reports pt has not been tolerating HD very well, with poor quality of life. Continue HD.  Nephrology following Diagnosed with ESRD and started HD in 2015.  Family reports pt has not been tolerating HD very well, with poor quality of life. They want to continue HD.  Nephrology following    Avoid NSAIDs Diastolic/systolic and pulmonary hypertension.  Diurese as tolerated. Family reports since CABG in September 2019 pt has had progressive decline cognitively and functionally.  Cardiology following   Discussed disease trajectory with son, risk for re-hospitalizations and declining functional status.

## 2020-07-01 NOTE — CONSULT NOTE ADULT - SUBJECTIVE AND OBJECTIVE BOX
HPI:  69M with CAD s/p multiple stents, s/p CABG (9/2019 course c/b Afib and LT pleural effusion), Diastolic CHF, HTN, HLD, DM2, Afib on coumadin and ESRD on HD (Tu, Thurs, Sat at [facility]), hyperthyroidism, presented to ER for pain and swelling of left leg since 3 days. Patient reports sustaining injury to posterior aspect of left leg 1 month ago when he accidentally hit his leg against his bed. He reports the wound has not healed since. Reports he developed pain and swelling of left leg since 3 days, 9/10 in severity, with difficulty ambulating. Reports he was advised to get doppler which he reviewed with [facility] cardiologist today, was told he has no clot and recommended to come to ER. Denies fever, chills, weakness, cough, dysuria, NVDC. (18 Jun 2020 19:29)    Interval hx: Pt seen and examined at the bedside, somnolent unable to participate in exam.  Palliative care to address goals of care.     PAST MEDICAL & SURGICAL HISTORY:  ESRD (end stage renal disease) on dialysis: T-Th-Sat  Stented coronary artery: total 15 stents from 0806-5889  Hyperthyroidism  H/O: CVA: after cardiac stent placed 12/15/09 -no residual  Heart Attack: 2/1/07 with subsequent stent  Coronary Stent: 4154-4717 10 stents,  to Ramus 1/2015, cath 01/2016 ( see results in HPI)  Hypercholesterolemia  CAD (Coronary Artery Disease)  DM (Diabetes Mellitus): x 4 yrs without N/N/R  HTN (Hypertension)  S/P CABG x 4  Hemodialysis access, AV graft: 5/2015, L arm  S/P cataract extraction  Boil of Buttock: 2006 and 2008      SOCIAL HISTORY:    Admitted from:  Home with family; HHA 4 hr/6 days weekly  Pt is , has 3 children.  His son Randal is the designated surrogate as his wife has Parkinson's dementia.   Substance abuse history: none    Tobacco hx:  none  Alcohol hx: none     Home Opioid hx: none  Pentecostal:   Rastafari                                  Preferred Language: Tamil/English     Surrogate/HCP/Guardian:  Randal Hardin           Phone#: [phone]    FAMILY HISTORY:  Family history of coronary artery disease  Family history of diabetes mellitus (Sibling)    Baseline ADLs (prior to admission): Independent     Allergies    lisinopril (Other)  opioid-like analgesics (Other)    Intolerances      Present Symptoms:   Unable to obtain due to poor mentation    MEDICATIONS  (STANDING):  acetaminophen  IVPB .. 1000 milliGRAM(s) IV Intermittent once  aMIOdarone    Tablet 200 milliGRAM(s) Oral daily  aspirin enteric coated 81 milliGRAM(s) Oral daily  calcium acetate 667 milliGRAM(s) Oral three times a day with meals  chlorhexidine 2% Cloths 1 Application(s) Topical daily  dextrose 5%. 1000 milliLiter(s) (50 mL/Hr) IV Continuous <Continuous>  glycopyrrolate 9 MICROgram(s)/formoterol 4.8 MICROgram(s) Inhaler 2 Puff(s) Inhalation two times a day  heparin   Injectable 6500 Unit(s) IV Push once  heparin  Infusion.  Unit(s)/Hr (15 mL/Hr) IV Continuous <Continuous>  insulin lispro (HumaLOG) corrective regimen sliding scale   SubCutaneous three times a day before meals  insulin lispro (HumaLOG) corrective regimen sliding scale   SubCutaneous at bedtime  ketotifen 0.025% Ophthalmic Solution 1 Drop(s) Both EYES two times a day  lidocaine 1% Injectable 20 milliLiter(s) Local Injection once  lidocaine 2% Gel 1 Application(s) Topical every 8 hours  methimazole 5 milliGRAM(s) Oral daily  metoprolol tartrate 100 milliGRAM(s) Oral two times a day  pantoprazole    Tablet 40 milliGRAM(s) Oral before breakfast  senna 2 Tablet(s) Oral at bedtime  simvastatin 40 milliGRAM(s) Oral at bedtime  tiotropium 18 MICROgram(s) Capsule 1 Capsule(s) Inhalation daily    MEDICATIONS  (PRN):  acetaminophen   Tablet .. 650 milliGRAM(s) Oral every 6 hours PRN Moderate Pain (4 - 6)  ALBUTerol    90 MICROgram(s) HFA Inhaler 2 Puff(s) Inhalation every 6 hours PRN Shortness of Breath and/or Wheezing  heparin   Injectable 6500 Unit(s) IV Push every 6 hours PRN For aPTT less than 40  heparin   Injectable 3000 Unit(s) IV Push every 6 hours PRN For aPTT between 40 - 57      PHYSICAL EXAM:    Vital Signs Last 24 Hrs  T(C): 36.3 (01 Jul 2020 07:55), Max: 36.8 (01 Jul 2020 00:16)  T(F): 97.4 (01 Jul 2020 07:55), Max: 98.2 (01 Jul 2020 00:16)  HR: 87 (01 Jul 2020 07:55) (87 - 127)  BP: 109/54 (01 Jul 2020 07:55) (102/63 - 125/74)  BP(mean): --  RR: 20 (01 Jul 2020 07:55) (20 - 24)  SpO2: 97% (01 Jul 2020 07:55) (95% - 100%)    General: Chronically ill appearing, somnolent, NAD  Karnofsky Performance Score/Palliative Performance Status Version2: 40    %    HEENT: atraumatic, moist oropharynx   Lungs: unlabored on RA  CV: S1S2, RRR  GI: soft +distention  : HD  Musculoskeletal: b/l trace edema   Skin: no rash or lesions noted  Neuro: somnolent, confused  Oral intake ability: moderate   Diet: regular    LABS:                        11.8   9.20  )-----------( 128      ( 01 Jul 2020 06:11 )             38.8     07-01    135  |  97  |  55<H>  ----------------------------<  143<H>  4.4   |  27  |  9.21<H>    Ca    8.5      01 Jul 2020 06:11          RADIOLOGY & ADDITIONAL STUDIES: Reviewed    ADVANCE DIRECTIVES: HPI:  69M with CAD s/p multiple stents, s/p CABG (9/2019 course c/b Afib and LT pleural effusion), Diastolic CHF, HTN, HLD, DM2, Afib on coumadin and ESRD on HD (Tu, Thurs, Sat at [facility]), hyperthyroidism, presented to ER for pain and swelling of left leg since 3 days. Patient reports sustaining injury to posterior aspect of left leg 1 month ago when he accidentally hit his leg against his bed. He reports the wound has not healed since. Reports he developed pain and swelling of left leg since 3 days, 9/10 in severity, with difficulty ambulating. Reports he was advised to get doppler which he reviewed with [facility] cardiologist today, was told he has no clot and recommended to come to ER. Denies fever, chills, weakness, cough, dysuria, NVDC. (18 Jun 2020 19:29)    Interval hx: Pt seen and examined at the bedside, lethargic,  unable to participate in exam.  Palliative care to address goals of care.     PAST MEDICAL & SURGICAL HISTORY:  ESRD (end stage renal disease) on dialysis: T-Th-Sat  Stented coronary artery: total 15 stents from 2918-4006  Hyperthyroidism  H/O: CVA: after cardiac stent placed 12/15/09 -no residual  Heart Attack: 2/1/07 with subsequent stent  Coronary Stent: 0134-5200 10 stents,  to Ramus 1/2015, cath 01/2016 ( see results in HPI)  Hypercholesterolemia  CAD (Coronary Artery Disease)  DM (Diabetes Mellitus): x 4 yrs without N/N/R  HTN (Hypertension)  S/P CABG x 4  Hemodialysis access, AV graft: 5/2015, L arm  S/P cataract extraction  Boil of Buttock: 2006 and 2008      SOCIAL HISTORY:    Admitted from:  Home with family; HHA 4 hr/6 days weekly  Pt is , has 3 children.  His son Randal is the designated surrogate as his wife has Parkinson's dementia.   Substance abuse history: none    Tobacco hx:  none  Alcohol hx: none     Home Opioid hx: none  Buddhism:   Sikhism                                  Preferred Language: Tamil/English     Surrogate/HCP/Guardian:  Randal Hardin           Phone#: [phone]    FAMILY HISTORY:  Family history of coronary artery disease  Family history of diabetes mellitus (Sibling)    Baseline ADLs (prior to admission): Independent     Allergies    lisinopril (Other)  opioid-like analgesics (Other)    Intolerances      Present Symptoms:   Unable to obtain due to poor mentation    MEDICATIONS  (STANDING):  acetaminophen  IVPB .. 1000 milliGRAM(s) IV Intermittent once  aMIOdarone    Tablet 200 milliGRAM(s) Oral daily  aspirin enteric coated 81 milliGRAM(s) Oral daily  calcium acetate 667 milliGRAM(s) Oral three times a day with meals  chlorhexidine 2% Cloths 1 Application(s) Topical daily  dextrose 5%. 1000 milliLiter(s) (50 mL/Hr) IV Continuous <Continuous>  glycopyrrolate 9 MICROgram(s)/formoterol 4.8 MICROgram(s) Inhaler 2 Puff(s) Inhalation two times a day  heparin   Injectable 6500 Unit(s) IV Push once  heparin  Infusion.  Unit(s)/Hr (15 mL/Hr) IV Continuous <Continuous>  insulin lispro (HumaLOG) corrective regimen sliding scale   SubCutaneous three times a day before meals  insulin lispro (HumaLOG) corrective regimen sliding scale   SubCutaneous at bedtime  ketotifen 0.025% Ophthalmic Solution 1 Drop(s) Both EYES two times a day  lidocaine 1% Injectable 20 milliLiter(s) Local Injection once  lidocaine 2% Gel 1 Application(s) Topical every 8 hours  methimazole 5 milliGRAM(s) Oral daily  metoprolol tartrate 100 milliGRAM(s) Oral two times a day  pantoprazole    Tablet 40 milliGRAM(s) Oral before breakfast  senna 2 Tablet(s) Oral at bedtime  simvastatin 40 milliGRAM(s) Oral at bedtime  tiotropium 18 MICROgram(s) Capsule 1 Capsule(s) Inhalation daily    MEDICATIONS  (PRN):  acetaminophen   Tablet .. 650 milliGRAM(s) Oral every 6 hours PRN Moderate Pain (4 - 6)  ALBUTerol    90 MICROgram(s) HFA Inhaler 2 Puff(s) Inhalation every 6 hours PRN Shortness of Breath and/or Wheezing  heparin   Injectable 6500 Unit(s) IV Push every 6 hours PRN For aPTT less than 40  heparin   Injectable 3000 Unit(s) IV Push every 6 hours PRN For aPTT between 40 - 57      PHYSICAL EXAM:    Vital Signs Last 24 Hrs  T(C): 36.3 (01 Jul 2020 07:55), Max: 36.8 (01 Jul 2020 00:16)  T(F): 97.4 (01 Jul 2020 07:55), Max: 98.2 (01 Jul 2020 00:16)  HR: 87 (01 Jul 2020 07:55) (87 - 127)  BP: 109/54 (01 Jul 2020 07:55) (102/63 - 125/74)  BP(mean): --  RR: 20 (01 Jul 2020 07:55) (20 - 24)  SpO2: 97% (01 Jul 2020 07:55) (95% - 100%)    General: Chronically ill appearing, lethargic,  NAD  Karnofsky Performance Score/Palliative Performance Status Version2: 40    %    HEENT: atraumatic, moist oropharynx   Lungs: unlabored on RA  CV: S1S2, RRR  GI: soft +distention  : HD  Musculoskeletal: b/l trace edema   Skin: no rash or lesions noted  Neuro: lethargic,  confused  Oral intake ability: moderate   Diet: regular    LABS:                        11.8   9.20  )-----------( 128      ( 01 Jul 2020 06:11 )             38.8     07-01    135  |  97  |  55<H>  ----------------------------<  143<H>  4.4   |  27  |  9.21<H>    Ca    8.5      01 Jul 2020 06:11          RADIOLOGY & ADDITIONAL STUDIES: Reviewed    ADVANCE DIRECTIVES:

## 2020-07-01 NOTE — PROGRESS NOTE ADULT - SUBJECTIVE AND OBJECTIVE BOX
Patient denies CP, SOB Review of systems otherwise (-)    acetaminophen   Tablet .. 650 milliGRAM(s) Oral every 6 hours PRN  acetaminophen  IVPB .. 1000 milliGRAM(s) IV Intermittent once  ALBUTerol    90 MICROgram(s) HFA Inhaler 2 Puff(s) Inhalation every 6 hours PRN  aMIOdarone    Tablet 200 milliGRAM(s) Oral daily  aspirin enteric coated 81 milliGRAM(s) Oral daily  calcium acetate 667 milliGRAM(s) Oral three times a day with meals  chlorhexidine 2% Cloths 1 Application(s) Topical daily  dextrose 5%. 1000 milliLiter(s) IV Continuous <Continuous>  glycopyrrolate 9 MICROgram(s)/formoterol 4.8 MICROgram(s) Inhaler 2 Puff(s) Inhalation two times a day  heparin   Injectable 6500 Unit(s) IV Push every 6 hours PRN  heparin   Injectable 3000 Unit(s) IV Push every 6 hours PRN  heparin  Infusion.  Unit(s)/Hr IV Continuous <Continuous>  insulin lispro (HumaLOG) corrective regimen sliding scale   SubCutaneous three times a day before meals  insulin lispro (HumaLOG) corrective regimen sliding scale   SubCutaneous at bedtime  ketotifen 0.025% Ophthalmic Solution 1 Drop(s) Both EYES two times a day  lidocaine 1% Injectable 20 milliLiter(s) Local Injection once  lidocaine 2% Gel 1 Application(s) Topical every 8 hours  methimazole 5 milliGRAM(s) Oral daily  metoprolol tartrate 100 milliGRAM(s) Oral two times a day  pantoprazole    Tablet 40 milliGRAM(s) Oral before breakfast  senna 2 Tablet(s) Oral at bedtime  simvastatin 40 milliGRAM(s) Oral at bedtime  tiotropium 18 MICROgram(s) Capsule 1 Capsule(s) Inhalation daily                            11.8   9.20  )-----------( 128      ( 01 Jul 2020 06:11 )             38.8       Hemoglobin: 11.8 g/dL (07-01 @ 06:11)  Hemoglobin: 13.1 g/dL (06-30 @ 09:03)  Hemoglobin: 12.8 g/dL (06-29 @ 06:13)  Hemoglobin: 12.7 g/dL (06-28 @ 06:50)  Hemoglobin: 12.5 g/dL (06-27 @ 09:58)      07-01    135  |  97  |  55<H>  ----------------------------<  143<H>  4.4   |  27  |  9.21<H>    Ca    8.5      01 Jul 2020 06:11      Creatinine Trend: 9.21<--, 11.90<--, 10.20<--, 8.35<--, 10.20<--, 8.56<--    COAGS: PT/INR - ( 01 Jul 2020 06:11 )   PT: 18.5 sec;   INR: 1.62 ratio         PTT - ( 01 Jul 2020 06:11 )  PTT:33.9 sec          T(C): 36.3 (07-01-20 @ 07:55), Max: 36.8 (07-01-20 @ 00:16)  HR: 87 (07-01-20 @ 07:55) (87 - 127)  BP: 109/54 (07-01-20 @ 07:55) (102/63 - 120/75)  RR: 20 (07-01-20 @ 07:55) (20 - 24)  SpO2: 97% (07-01-20 @ 07:55) (95% - 100%)  Wt(kg): --    I&O's Summary    30 Jun 2020 07:01  -  01 Jul 2020 07:00  --------------------------------------------------------  IN: 500 mL / OUT: 530 mL / NET: -30 mL        Gen: Appears well in NAD  HEENT:  (-)icterus (-)pallor  CV: N S1 S2 1/6 DEJAH (+)2 Pulses B/l  Resp:  Clear to ausculatation B/L, normal effort  GI: (+) BS Soft, NT, ND  Lymph:  (+) minimal Edema, (-)obvious lymphadenopathy  Skin: Warm to touch, Normal turgor  Psych: Appropriate mood and affect      ASSESSMENT/PLAN: 69y Male  CAD s/p multiple stents, s/p CABG (9/2019 course c/b Afib and LT pleural effusion), Normal LV systolic function, severe Diastolic, RV dysfx CHF, HTN, HLD, DM2, Afib on coumadin and ESRD on HD (Tu, Thurs, Sat at [facility]), hyperthyroidism, presented to ER for pain and swelling of left leg since 3 days.    - LE dopplers negative for dVT   - s/p thoracentesis yesterday  - Resume AC when ok with team  - Goal INR 2-3      Murphy Colin MD, [facility]  BEEPER [phone]

## 2020-07-01 NOTE — PROGRESS NOTE ADULT - PROBLEM SELECTOR PLAN 8
Continue with Coumadin when cleared by CT surgery Heparin drip started 7/1  D/C prior to procedure 4am 7/2

## 2020-07-01 NOTE — CONSULT NOTE ADULT - PROBLEM SELECTOR RECOMMENDATION 4
Pt was AOx1, at the time of exam.  Spoke with his son Randal who expressed he is the designated surrogate as his mother has Parkinson's dementia. Discussed the pt's clinical status and goals of care.  He has a very astute knowledge and understanding  of his father's medical history.  He Pt was AOx1, at the time of exam.  Spoke with his son Randal, palliative care's role in symptom management, identification of goals of care, advance care planning, and support discussed.  Address the role of HCP he stated his mother was assigned as his father HCP however, given his mother has Parkinson's dementia he is the designated surrogate.  Discussed the pt's clinical status and goals of care.  He has a very astute knowledge and understanding  of his father's medical history.    Regarding code status, discussed that CPR and/or intubation in context of advanced illness would be unlikely to benefit  or improve his quality of life. Randal stated he had a family discussion and they are in agreement and would prefer to allow for natural death if his heart stops or he stops breathing. LAURIE drafted per family's wishes.    All questions answered.  Support provided. Pt was AOx1, at the time of exam.  Spoke with his son Randal, palliative care's role in symptom management, identification of goals of care, advance care planning, and support discussed.  Address the role of HCP he stated his mother was assigned as his father HCP however, given his mother has Parkinson's dementia he is the designated surrogate.  Discussed the pt's clinical status and goals of care.  He has a very astute knowledge and understanding  of his father's medical history.    Regarding code status, discussed that CPR and/or intubation in context of advanced illness would be unlikely to benefit  or improve his quality of life. Randal stated he had a family discussion and they are in agreement and would prefer to allow for natural death if his heart stops or he stops breathing. LAURIE drafted per family's wishes for DNR/DNI.   All questions answered.  Support provided. 2/2 comorbidities, HF and ESRD. Per family pt has good days and bad days with his appetite.  Albumin 3.1.  Diet as tolerated. Supportive counseling provided including discussion of providing nutrient dense, high protein food in small meals throughout the day.   Nutrition eval noted.

## 2020-07-01 NOTE — CHART NOTE - NSCHARTNOTEFT_GEN_A_CORE
Assessment:   69yMalePatient is a 69y old  Male who presents with a chief complaint of leg pain (01 Jul 2020 10:03).  Pt visited. Pt is NPO at present D/W NP Pt is for Chest tube placement but  reschedule tomorrow. S/P Thoracentesis on 06/30. Pt is W ESRD on Dialysis.      Factors impacting intake: [ ] none [ ] nausea  [ ] vomiting [ ] diarrhea [ ] constipation  [ ]chewing problems [ ] swallowing issues  [ ] other:     Diet Prescription: Diet, DASH/TLC:   Sodium & Cholesterol Restricted  Consistent Carbohydrate {Evening Snacks}  1200mL Fluid Restriction ([id number])  For patients receiving Renal Replacement - No Protein Restr, No Conc K, No Conc Phos, Low Sodium (RENAL) (06-18-20 @ 19:28)  Diet, NPO after Midnight:      NPO Start Date: 30-Jun-2020,   NPO Start Time: 23:59 (06-30-20 @ 17:07)  Diet, NPO after Midnight:      NPO Start Date: 01-Jul-2020,   NPO Start Time: 23:59 (07-01-20 @ 11:19)    Intake: > 50 %     Current Weight:   % Weight Change    Pertinent Medications: MEDICATIONS  (STANDING):  acetaminophen  IVPB .. 1000 milliGRAM(s) IV Intermittent once  aMIOdarone    Tablet 200 milliGRAM(s) Oral daily  aspirin enteric coated 81 milliGRAM(s) Oral daily  calcium acetate 667 milliGRAM(s) Oral three times a day with meals  chlorhexidine 2% Cloths 1 Application(s) Topical daily  dextrose 5%. 1000 milliLiter(s) (50 mL/Hr) IV Continuous <Continuous>  glycopyrrolate 9 MICROgram(s)/formoterol 4.8 MICROgram(s) Inhaler 2 Puff(s) Inhalation two times a day  heparin   Injectable 6500 Unit(s) IV Push once  heparin  Infusion.  Unit(s)/Hr (15 mL/Hr) IV Continuous <Continuous>  insulin lispro (HumaLOG) corrective regimen sliding scale   SubCutaneous three times a day before meals  insulin lispro (HumaLOG) corrective regimen sliding scale   SubCutaneous at bedtime  ketotifen 0.025% Ophthalmic Solution 1 Drop(s) Both EYES two times a day  lidocaine 1% Injectable 20 milliLiter(s) Local Injection once  lidocaine 2% Gel 1 Application(s) Topical every 8 hours  methimazole 5 milliGRAM(s) Oral daily  metoprolol tartrate 100 milliGRAM(s) Oral two times a day  pantoprazole    Tablet 40 milliGRAM(s) Oral before breakfast  senna 2 Tablet(s) Oral at bedtime  simvastatin 40 milliGRAM(s) Oral at bedtime  tiotropium 18 MICROgram(s) Capsule 1 Capsule(s) Inhalation daily  -  MEDICATIONS  (PRN):  acetaminophen   Tablet .. 650 milliGRAM(s) Oral every 6 hours PRN Moderate Pain (4 - 6)  ALBUTerol    90 MICROgram(s) HFA Inhaler 2 Puff(s) Inhalation every 6 hours PRN Shortness of Breath and/or Wheezing  heparin   Injectable 6500 Unit(s) IV Push every 6 hours PRN For aPTT less than 40  heparin   Injectable 3000 Unit(s) IV Push every 6 hours PRN For aPTT between 40 - 57    Pertinent Labs: 07-01 Na135 mmol/L Glu 143 mg/dL<H> K+ 4.4 mmol/L Cr  9.21 mg/dL<H> BUN 55 mg/dL<H> 06-25 Phos 8.5 mg/dL<H> 06-25 Alb 3.1 g/dL<L> 06-19 Chol 94 mg/dL LDL 39 mg/dL HDL 33 mg/dL<L> Trig 110 mg/dL     CAPILLARY BLOOD GLUCOSE      POCT Blood Glucose.: 143 mg/dL (01 Jul 2020 06:42)  POCT Blood Glucose.: 212 mg/dL (01 Jul 2020 00:25)  POCT Blood Glucose.: 180 mg/dL (30 Jun 2020 21:08)  POCT Blood Glucose.: 132 mg/dL (30 Jun 2020 17:02)    Skin:     Estimated Needs:   [ ] no change since previous assessment  [ ] recalculated:     Previous Nutrition Diagnosis:   [ ] Inadequate Energy Intake [ ]Inadequate Oral Intake [ ] Excessive Energy Intake   [ ] Underweight [ ] Increased Nutrient Needs [ ] Overweight/Obesity (x) Altered Nutrition Related Labs Continuos.  (x)   [ ] Altered GI Function [ ] Unintended Weight Loss [ ] Food & Nutrition Related Knowledge Deficit [ ] Malnutrition     Nutrition Diagnosis is [ x] ongoing  [ ] resolved [ ] not applicable     New Nutrition Diagnosis: [ ] not applicable       Interventions:   Recommend  [x ] Change Diet To: Continue with current diet Diabetic Pm snack DASH / TLC Diabetic PM snack......  [x ] Nutrition Supplement Glucerna shakes Once a day.   [ ] Nutrition Support  [x ] Other:      Monitoring and Evaluation:   [ ] PO intake [ x ] Tolerance to diet prescription [ x ] weights [ x ] labs[ x ] follow up per protocol  [ ] other:

## 2020-07-01 NOTE — PROGRESS NOTE ADULT - ATTENDING COMMENTS
HPI:  69M with CAD s/p multiple stents, s/p CABG (9/2019 course c/b Afib and LT pleural effusion), Diastolic CHF, HTN, HLD, DM2, Afib on coumadin and ESRD on HD (Tu, Thurs, Sat at [facility]), hyperthyroidism, presented to ER for pain and swelling of left leg since 3 days. Patient reports sustaining injury to posterior aspect of left leg 1 month ago when he accidentally hit his leg against his bed. He reports the wound has not healed since. Reports he developed pain and swelling of left leg since 3 days, 9/10 in severity, with difficulty ambulating. Reports he was advised to get doppler which he reviewed with [facility] cardiologist today, was told he has no clot and recommended to come to ER. Denies fever, chills, weakness, cough, dysuria, NVDC. (18 Jun 2020 19:29)    - LEFT ARM SWELLING - F/U LEFT UPPER EXTREMITY ULTRASOUND  -  CELLULITIS OF LEFT LEG, DIABETIC LEFT FOOT ULCER  - IMPROVED ON VANCOMYCIN [PER ID RECOMMENDATION, SWITCHED FROM UNASYN+DOXY]. BLOOD  CXS NGTD. ID F/UP BY DR. DUEÑAS    - LOCULATED LEFT SIDED PLEURAL EFFUSION - PULMONOLOGY CONSULT PLACED. SURGERY CONSULT PLACED. CT-GUIDED DRAINAGE ATTEMPTED TODAY HOWEVER IT WAS UNSUCCESSFUL; SURGERY RECOMMENDS IR-GUIDED CHEST TUBE PLACEMENT [SON WAS INFORMED AND AGREES TO IR GUIDED CHEST TUBE PLACEMENT] - PLAN FOR PROCEDURE TOMORROW.   - ACUTE METABOLIC ENCEPHALOPATHY - WILL MINIMIZE DELIRIOGENIC AGENTS, FREQUENT RE-ORIENTATION  -  ESRD ON HD - NEPHROLOGY CONSULT IN PROGRESS  - DIASTOLIC CHF - TTE W/ EVIDENCE OF RIGHT/LEFT SYSTOLIC DYSFXN, PULMONARY HTN, MR, STRESS TEST WITHOUT NEW PERFUSION ABNORMALITIES; CARDIOLOGY CONSULT IN PROGRESS  - A/FIB ON COUMADIN - HOLD COUMADIN, PLACED ON HEPARIN DRIP  -  PAD, DIABETIC PERIPHERAL NEUROPATHY - PAIN MGMT CONSULT IN PROGRESS  - CASE DISCUSSED EXTENSIVELY WITH SON [SYLVAIN RENTERIA ] AND PATIENT. DISCUSSED RISKS/BENEFITS OF CHEST TUBE PLACEMENT WITH SON, WHO AGREED TO PROCEED WITH INTERVENTION.  -  GI AND DVT PROPHYLAXIS    DONALD DIAZ M.D. COVERING EPHRAIM DIAZ M.D. HPI:  69M with CAD s/p multiple stents, s/p CABG (9/2019 course c/b Afib and LT pleural effusion), Diastolic CHF, HTN, HLD, DM2, Afib on coumadin and ESRD on HD (Tu, Thurs, Sat at [facility]), hyperthyroidism, presented to ER for pain and swelling of left leg since 3 days. Patient reports sustaining injury to posterior aspect of left leg 1 month ago when he accidentally hit his leg against his bed. He reports the wound has not healed since. Reports he developed pain and swelling of left leg since 3 days, 9/10 in severity, with difficulty ambulating. Reports he was advised to get doppler which he reviewed with [facility] cardiologist today, was told he has no clot and recommended to come to ER. Denies fever, chills, weakness, cough, dysuria, NVDC. (18 Jun 2020 19:29)    - LEFT ARM SWELLING - F/U LEFT UPPER EXTREMITY ULTRASOUND  -  CELLULITIS OF LEFT LEG, DIABETIC LEFT FOOT ULCER  - IMPROVED ON VANCOMYCIN [PER ID RECOMMENDATION, SWITCHED FROM UNASYN+DOXY]. BLOOD  CXS NGTD. ID F/UP BY DR. DUEÑAS    - LOCULATED LEFT SIDED PLEURAL EFFUSION - PULMONOLOGY CONSULT PLACED. SURGERY CONSULT PLACED. CT-GUIDED DRAINAGE ATTEMPTED TODAY HOWEVER IT WAS UNSUCCESSFUL; SURGERY RECOMMENDS IR-GUIDED CHEST TUBE PLACEMENT [SON WAS INFORMED AND AGREES TO IR GUIDED CHEST TUBE PLACEMENT] - PLAN FOR PROCEDURE TOMORROW.   - ACUTE METABOLIC ENCEPHALOPATHY - WILL MINIMIZE DELIRIOGENIC AGENTS, FREQUENT RE-ORIENTATION  - PATIENT REPORTED VISION LOSS - ON EVALUATION VISION WAS INTACT  -  ESRD ON HD - NEPHROLOGY CONSULT IN PROGRESS  - DIASTOLIC CHF - TTE W/ EVIDENCE OF RIGHT/LEFT SYSTOLIC DYSFXN, PULMONARY HTN, MR, STRESS TEST WITHOUT NEW PERFUSION ABNORMALITIES; CARDIOLOGY CONSULT IN PROGRESS  - A/FIB ON COUMADIN - HOLD COUMADIN, PLACED ON HEPARIN DRIP  -  PAD, DIABETIC PERIPHERAL NEUROPATHY - PAIN MGMT CONSULT IN PROGRESS  - CASE DISCUSSED EXTENSIVELY WITH SON [SYLVAIN RENTERIA ] AND PATIENT. DISCUSSED RISKS/BENEFITS OF CHEST TUBE PLACEMENT WITH SON, WHO AGREED TO PROCEED WITH INTERVENTION.  -  GI AND DVT PROPHYLAXIS    DONALD DIAZ M.D. COVERING EPHRAIM DIAZ M.D. HPI:  69M with CAD s/p multiple stents, s/p CABG (9/2019 course c/b Afib and LT pleural effusion), Diastolic CHF, HTN, HLD, DM2, Afib on coumadin and ESRD on HD (Tu, Thurs, Sat at [facility]), hyperthyroidism, presented to ER for pain and swelling of left leg since 3 days. Patient reports sustaining injury to posterior aspect of left leg 1 month ago when he accidentally hit his leg against his bed. He reports the wound has not healed since. Reports he developed pain and swelling of left leg since 3 days, 9/10 in severity, with difficulty ambulating. Reports he was advised to get doppler which he reviewed with [facility] cardiologist today, was told he has no clot and recommended to come to ER. Denies fever, chills, weakness, cough, dysuria, NVDC. (18 Jun 2020 19:29)    - LEFT ARM SWELLING - F/U LEFT UPPER EXTREMITY ULTRASOUND  -  CELLULITIS OF LEFT LEG, DIABETIC LEFT FOOT ULCER  - IMPROVED ON VANCOMYCIN [PER ID RECOMMENDATION, SWITCHED FROM UNASYN+DOXY]. BLOOD  CXS NGTD. ID F/UP BY DR. DUEÑAS    - LOCULATED LEFT SIDED PLEURAL EFFUSION - PULMONOLOGY CONSULT PLACED. SURGERY CONSULT PLACED. CT-GUIDED DRAINAGE ATTEMPTED TODAY HOWEVER IT WAS UNSUCCESSFUL; SURGERY RECOMMENDS IR-GUIDED CHEST TUBE PLACEMENT [SON WAS INFORMED AND AGREES TO IR GUIDED CHEST TUBE PLACEMENT]. F/U PLEURAL FLUID ANALYSIS. PLAN FOR PROCEDURE TOMORROW.   - ACUTE METABOLIC ENCEPHALOPATHY - WILL MINIMIZE DELIRIOGENIC AGENTS, FREQUENT RE-ORIENTATION  - PATIENT REPORTED VISION LOSS - ON EVALUATION VISION WAS INTACT  -  ESRD ON HD - NEPHROLOGY CONSULT IN PROGRESS  - DIASTOLIC CHF - TTE W/ EVIDENCE OF RIGHT/LEFT SYSTOLIC DYSFXN, PULMONARY HTN, MR, STRESS TEST WITHOUT NEW PERFUSION ABNORMALITIES; CARDIOLOGY CONSULT IN PROGRESS  - A/FIB ON COUMADIN - HOLD COUMADIN, PLACED ON HEPARIN DRIP  -  PAD, DIABETIC PERIPHERAL NEUROPATHY - PAIN MGMT CONSULT IN PROGRESS  - CASE DISCUSSED EXTENSIVELY WITH SON [SYLVAIN RENTERIA ] AND PATIENT. DISCUSSED RISKS/BENEFITS OF CHEST TUBE PLACEMENT WITH SON, WHO AGREED TO PROCEED WITH INTERVENTION.  -  GI AND DVT PROPHYLAXIS    DONALD DIAZ M.D. COVERING EPHRAIM DIAZ M.D.

## 2020-07-01 NOTE — PROGRESS NOTE ADULT - ASSESSMENT
×If you receive an error when searching the  Registry clear your cache and log back in.  Using Chrome: Delete your browsing History.  Using Internet Explorer:  Clear your browsing data.   Patient Search   Multi-Patient Search   Reports   Drug Listing   Designation   My ROGERS Numbers  Data Detail Level: Printer-Friendly View Extended View  Confidential Drug Utilization Report  Search Terms: jannie hardin, 1950Search Date: 06/24/2020 15:17:19 PM  The Drug Utilization Report below displays all of the controlled substance prescriptions, if any, that your patient has filled in the last twelve months. The information displayed on this report is compiled from pharmacy submissions to the Department, and accurately reflects the information as submitted by the pharmacies.    This report was requested by: French Pabon | Reference #: [id number]    Others' Prescriptions  Patient Name: Jannie HardinBirth Date: 1950  Address: [address] [place] [id number]: Male  Rx Written	Rx Dispensed	Drug	Quantity	Days Supply	Prescriber Name	Payment Method	Dispenser  10/06/2019	10/06/2019	oxycodone hcl 5 mg tablet	20	5	Bernardo Gary Jo	Medicaid	[facility]  * - Drugs marked with an asterisk are compound drugs. If the compound drug is made up of more than one controlled substance, then each controlled

## 2020-07-01 NOTE — CONSULT NOTE ADULT - ATTENDING COMMENTS
69 y M w multiple comorbidities, ESRD on HD, adm for LLE cellulitis, encephalopathy 69 y M w multiple comorbidities, ICM w EF 40-45%, ESRD on HD, adm for LLE cellulitis, encephalopathy, L pleural effusion, for chest tube placement. Lives home w family, has HHA several hours/day, may require more assistance. Now DNR/DNI per family wishes. Tolerating HD at this time. Not a hospice candidate at present. 69 y M w multiple comorbidities, ICM w EF 40-45%, ESRD on HD, adm for LLE cellulitis, encephalopathy, L pleural effusion, for chest tube placement. Lives home w family, has HHA several hours/day, may require more assistance. Now DNR/DNI per family wishes. Tolerating HD at this time, not a hospice candidate at present.

## 2020-07-01 NOTE — PROGRESS NOTE ADULT - SUBJECTIVE AND OBJECTIVE BOX
Interventional Radiology Brief Consult Note.     Contacted by thoracic surgery for possible left chest tube placement. History obtained from consulting clinician and chart review.   Patient is s/p left thoracentesis yesterday, yielding "blood tinged fluid" ...official results pending. Patient is still with a moderate to large left loculated pleural effusion. Patient is appropriate candidate for the procedure; however, patient did receive therapeutic dose Lovenox less than 12 hours ago. Given that patient is currently asymptomatic with stable vital signs, the risk/benefit analysis of performing the procedure at this time is not in patient's favor.     The procedure is scheduled for tomorrow.  -NPO past midnight  -Hold therapeutic Lovenox starting noon time today (if cleared medically)  -AM CBC/coags/BMP    Please call IR with any questions, issues or concerns.    Case d/w with JOI Kerns and with Dr. Torres.

## 2020-07-01 NOTE — PROGRESS NOTE ADULT - PROBLEM SELECTOR PLAN 1
Pt with left lower leg pain which is somatic and neuropathic in nature due to cellulitis. Vancomycin completed 6/30. Pt s/p left thoracentesis 6/30, plan for IR for drainage of large left pleural effusion tomorrow. Pt started on heparin gtt per primary team.  High risk medications reviewed. Avoid polypharmacy. Avoid IV opioids. Avoid NSAIDs and benzodiazepines. Non-opioid medications and non-pharmacological pain management measures initiated.   Opioid pain recommendations   - Maximize non-opioid pain recommendations. Per pt's family, pt is sensitive to opioids, experiences delirium and/or aggression.  Non-opioid pain recommendations   - Continue Acetaminophen 650mg PO q 6 hours PRN moderate pain.   - Continue Gabapentin 100mg po q 12 hours, renally dosed.  - Avoid NSAIDs due to ESRD  - Lidocaine gel to b/l legs q8h.   Bowel Regimen  - Continue Senna 2 tablets at bedtime for constipation  Mild pain   - Non-pharmacological pain treatment recommendations  - Warm/ Cool packs PRN   - Repositioning extremity, elevation, imagery, relaxation, distraction.  - Physical therapy OOB if no contraindications   Recommendations discussed with primary team and RN

## 2020-07-01 NOTE — PROGRESS NOTE ADULT - SUBJECTIVE AND OBJECTIVE BOX
Time of Visit:  Patient seen and examined. laying in bed comfortable    MEDICATIONS  (STANDING):  acetaminophen  IVPB .. 1000 milliGRAM(s) IV Intermittent once  aMIOdarone    Tablet 200 milliGRAM(s) Oral daily  aspirin enteric coated 81 milliGRAM(s) Oral daily  calcium acetate 667 milliGRAM(s) Oral three times a day with meals  chlorhexidine 2% Cloths 1 Application(s) Topical daily  dextrose 5%. 1000 milliLiter(s) (50 mL/Hr) IV Continuous <Continuous>  glycopyrrolate 9 MICROgram(s)/formoterol 4.8 MICROgram(s) Inhaler 2 Puff(s) Inhalation two times a day  heparin  Infusion.  Unit(s)/Hr (15 mL/Hr) IV Continuous <Continuous>  insulin lispro (HumaLOG) corrective regimen sliding scale   SubCutaneous three times a day before meals  insulin lispro (HumaLOG) corrective regimen sliding scale   SubCutaneous at bedtime  ketotifen 0.025% Ophthalmic Solution 1 Drop(s) Both EYES two times a day  lidocaine 1% Injectable 20 milliLiter(s) Local Injection once  lidocaine 2% Gel 1 Application(s) Topical every 8 hours  methimazole 5 milliGRAM(s) Oral daily  metoprolol tartrate 100 milliGRAM(s) Oral two times a day  pantoprazole    Tablet 40 milliGRAM(s) Oral before breakfast  senna 2 Tablet(s) Oral at bedtime  simvastatin 40 milliGRAM(s) Oral at bedtime  tiotropium 18 MICROgram(s) Capsule 1 Capsule(s) Inhalation daily      MEDICATIONS  (PRN):  acetaminophen   Tablet .. 650 milliGRAM(s) Oral every 6 hours PRN Moderate Pain (4 - 6)  ALBUTerol    90 MICROgram(s) HFA Inhaler 2 Puff(s) Inhalation every 6 hours PRN Shortness of Breath and/or Wheezing  heparin   Injectable 6500 Unit(s) IV Push every 6 hours PRN For aPTT less than 40  heparin   Injectable 3000 Unit(s) IV Push every 6 hours PRN For aPTT between 40 - 57       Medications up to date at time of exam.      PHYSICAL EXAMINATION:  Patient has no new complaints.  GENERAL: The patient is a well-developed, well-nourished, in no apparent distress.     Vital Signs Last 24 Hrs  T(C): 36.3 (01 Jul 2020 07:55), Max: 36.8 (01 Jul 2020 00:16)  T(F): 97.4 (01 Jul 2020 07:55), Max: 98.2 (01 Jul 2020 00:16)  HR: 87 (01 Jul 2020 07:55) (87 - 127)  BP: 109/54 (01 Jul 2020 07:55) (102/63 - 120/75)  BP(mean): --  RR: 20 (01 Jul 2020 07:55) (20 - 24)  SpO2: 97% (01 Jul 2020 07:55) (95% - 100%)     (if applicable)    Chest Tube (if applicable)    HEENT: Head is normocephalic and atraumatic. Extraocular muscles are intact. Mucous membranes are moist.     NECK: Supple, no palpable adenopathy.    LUNGS: + left side dullness    HEART: Regular rate and rhythm without murmur.    ABDOMEN: Soft, nontender, and nondistended.  No hepatosplenomegaly is noted.    : No painful voiding, no pelvic pain    EXTREMITIES: Without any cyanosis, clubbing, rash, lesions or edema.    NEUROLOGIC: Awake,     SKIN: Warm, dry, good turgor.      LABS:                        11.8   9.20  )-----------( 128      ( 01 Jul 2020 06:11 )             38.8     07-01    135  |  97  |  55<H>  ----------------------------<  143<H>  4.4   |  27  |  9.21<H>    Ca    8.5      01 Jul 2020 06:11      PT/INR - ( 01 Jul 2020 06:11 )   PT: 18.5 sec;   INR: 1.62 ratio         PTT - ( 01 Jul 2020 06:11 )  PTT:33.9 sec                    MICROBIOLOGY: (if applicable)    RADIOLOGY & ADDITIONAL STUDIES:  EKG:   CXR:< from: Xray Chest 1 View- PORTABLE-Urgent (06.30.20 @ 17:01) >    EXAM:  XR CHEST PORTABLE URGENT 1V                            PROCEDURE DATE:  06/30/2020          INTERPRETATION:  TIME OF EXAM: June 30, 2020 at 4:54 PM.    CLINICAL INFORMATION: Status post bedside thoracentesis.    COMPARISON:  June 30, 2020 at2:24 PM.    TECHNIQUE:   AP Portable chest x-ray. Limited by rotation.    INTERPRETATION:     Heart size and the mediastinum cannot be accurately evaluated on this projection. Median sternotomy sutures, surgical clips, and abandoned epicardial pacemaker leads are again seen.  Note is again made of a discontinuous vascular stent in the left subclavian and brachiocephalic region.  The right lung is clear. No right pleural effusion is seen.  A large loculated left pleural effusion with likely associated passive atelectasis, appears slightly smaller. No pneumothorax is seen.            IMPRESSION:  No pneumothorax seen.    Large loculated left pleural effusion with likely associated passive atelectasis, slightly smaller.    Note again made of a discontinuous vascular stent in the left subclavian and brachiocephalic region.                KWASI CAMARENA M.D., ATTENDING RADIOLOGIST  This document has been electronically signed. Jun 30 2020  5:07PM                < end of copied text >    ECHO:    IMPRESSION: 69y Male PAST MEDICAL & SURGICAL HISTORY:  ESRD (end stage renal disease) on dialysis: T-Th-Sat  Stented coronary artery: total 15 stents from [id number]  Hyperthyroidism  H/O: CVA: after cardiac stent placed 12/15/09 -no residual  Heart Attack: 2/1/07 with subsequent stent  Coronary Stent: [id number] 10 stents,  to Ramus 1/2015, cath 01/2016 ( see results in HPI)  Hypercholesterolemia  CAD (Coronary Artery Disease)  DM (Diabetes Mellitus): x 4 yrs without N/N/R  HTN (Hypertension)  S/P CABG x 4  Hemodialysis access, AV graft: 5/2015, L arm  S/P cataract extraction  Boil of Buttock: 2006 and 2008   p/w       IMP: This is a  69 man  with CAD s/p multiple stents, s/p CABG (9/2019 course c/b Afib and LT pleural effusion), Diastolic CHF, HTN, HLD, DM2, Afib on coumadin and ESRD on HD (Tu, Thurs, Sat at [facility]), hyperthyroidism, presented to ER for pain and swelling of left leg since 3 days. Admitted to medicine for LLE cellulitis.  CXR shows gross heart enlargement and mild to moderate left base pleural pulmonary reaction again noted. Sternotomy again seen. Negative for DVT on Doppler study. Blood Cx NGTD. Pt was started on Unasyn and po Doxy, but changed to Vancomycin due to non healing cellulitis. ID consulted.  Improved leg swelling and erythema with Vanco.   Pt. with ESRD on HD, followed by nephro Dr. Calle, HD tolerated. Pt. with significant cardiac hx including AF on Coumadin, followed by cardiology. Coumadin dose adjusted for goal INR 2-3. ECHO resulted EF 40-45% (previous EF 55% in Jan. 2020.  CT chest reveal enlarging left loculated pleural effusion. Pat is not in any resp distress and O2 sat 100 % 2 LPM via NC. He will require chest tube placement but INR is elevated due to coumadin. Thoracic surgery unable to place chest tube, s/p thoracentesis 6/30 .. 150 ml . Post procedure cxr neg for ptx        Sugg;  -resume therapeutic anticoag with hep and coumadin  -f/u pleural fluid studies  -dialysis as per neph  -continue O2 via NC as needed

## 2020-07-01 NOTE — PROGRESS NOTE ADULT - PROBLEM SELECTOR PLAN 4
Pt on Coumadin  Held secondary to supra therapeutic and now for chest tube  Follow up with CT surgery for resumption  Follow up INR in AM Rate controlled   HR- 93  Started on Heparin drip   D/C @4am prior to procedure   Follow up PT/INR in AM

## 2020-07-01 NOTE — PROGRESS NOTE ADULT - SUBJECTIVE AND OBJECTIVE BOX
No acute events overnight. Pt resting comfortably. No respiratory complaints.     MEDICATIONS  (STANDING):  aMIOdarone    Tablet 200 milliGRAM(s) Oral daily  aspirin enteric coated 81 milliGRAM(s) Oral daily  calcium acetate 667 milliGRAM(s) Oral three times a day with meals  chlorhexidine 2% Cloths 1 Application(s) Topical daily  dextrose 5%. 1000 milliLiter(s) (50 mL/Hr) IV Continuous <Continuous>  gabapentin 100 milliGRAM(s) Oral two times a day  glycopyrrolate 9 MICROgram(s)/formoterol 4.8 MICROgram(s) Inhaler 2 Puff(s) Inhalation two times a day  insulin lispro (HumaLOG) corrective regimen sliding scale   SubCutaneous three times a day before meals  insulin lispro (HumaLOG) corrective regimen sliding scale   SubCutaneous at bedtime  ketotifen 0.025% Ophthalmic Solution 1 Drop(s) Both EYES two times a day  lidocaine 2% Gel 1 Application(s) Topical every 8 hours  methimazole 5 milliGRAM(s) Oral daily  metoprolol tartrate 100 milliGRAM(s) Oral two times a day  pantoprazole    Tablet 40 milliGRAM(s) Oral before breakfast  senna 2 Tablet(s) Oral at bedtime  simvastatin 40 milliGRAM(s) Oral at bedtime  sodium chloride 2 Gram(s) Oral every 6 hours  tiotropium 18 MICROgram(s) Capsule 1 Capsule(s) Inhalation daily    MEDICATIONS  (PRN):  acetaminophen   Tablet .. 650 milliGRAM(s) Oral every 6 hours PRN Moderate Pain (4 - 6)  ALBUTerol    90 MICROgram(s) HFA Inhaler 2 Puff(s) Inhalation every 6 hours PRN Shortness of Breath and/or Wheezing      ICU Vital Signs Last 24 Hrs  T(C): 36.3 (01 Jul 2020 07:55), Max: 37.5 (30 Jun 2020 08:53)  T(F): 97.4 (01 Jul 2020 07:55), Max: 99.5 (30 Jun 2020 08:53)  HR: 87 (01 Jul 2020 07:55) (87 - 127)  BP: 109/54 (01 Jul 2020 07:55) (102/63 - 125/74)  BP(mean): --  ABP: --  ABP(mean): --  RR: 20 (01 Jul 2020 07:55) (20 - 24)  SpO2: 97% (01 Jul 2020 07:55) (94% - 100%)      Physical:  General: Alert and oriented, not in acute distress  Resp: Breathing unlabored, no accessory muscle use, saturating 98% on 3L NC, dressing cdi  lungs: cta  Extremities: LLE with small dried ulcer posterior lower leg, no calf pain    LABS:                                 11.8   9.20  )-----------( 128      ( 01 Jul 2020 06:11 )             38.8     07-01    135  |  97  |  55<H>  ----------------------------<  143<H>  4.4   |  27  |  9.21<H>    Ca    8.5      01 Jul 2020 06:11

## 2020-07-01 NOTE — PROGRESS NOTE ADULT - SUBJECTIVE AND OBJECTIVE BOX
Source of information: VIOLETTA RENTERIA, Chart review  Patient language: English  : n/a    HPI:  69M with CAD s/p multiple stents, s/p CABG (9/2019 course c/b Afib and LT pleural effusion), Diastolic CHF, HTN, HLD, DM2, Afib on coumadin and ESRD on HD (Tu, Thurs, Sat at [facility]), hyperthyroidism, presented to ER for pain and swelling of left leg since 3 days. Patient reports sustaining injury to posterior aspect of left leg 1 month ago when he accidentally hit his leg against his bed. He reports the wound has not healed since. Reports he developed pain and swelling of left leg since 3 days, 9/10 in severity, with difficulty ambulating. Reports he was advised to get doppler which he reviewed with [facility] cardiologist today, was told he has no clot and recommended to come to ER. Denies fever, chills, weakness, cough, dysuria, NVDC. (18 Jun 2020 19:29)      This is a Patient is a 69y old  Male who presents with a chief complaint of leg pain. Found to have left leg cellulitis, vancomycin completed 6/30. Pt s/p left thoracentesis 6/30, plan for IR for drainage of large left pleural effusion tomorrow. Pt started on heparin gtt per primary team. Pt seen and examined at bedside. Pt denies pain today. Appears lethargic. States he wants to go home.  Pt tolerating PO diet. Pt denies lethargy, nausea, vomiting, constipation and itchiness. Patient stated goal for pain control: to be able to get out of bed to chair and ambulate with tolerable pain control.     PAST MEDICAL & SURGICAL HISTORY:  ESRD (end stage renal disease) on dialysis: T-Th-Sat  Stented coronary artery: total 15 stents from [id number]  Hyperthyroidism  H/O: CVA: after cardiac stent placed 12/15/09 -no residual  Heart Attack: 2/1/07 with subsequent stent  Coronary Stent: [id number] 10 stents,  to Ramus 1/2015, cath 01/2016 ( see results in HPI)  Hypercholesterolemia  CAD (Coronary Artery Disease)  DM (Diabetes Mellitus): x 4 yrs without N/N/R  HTN (Hypertension)  S/P CABG x 4  Hemodialysis access, AV graft: 5/2015, L arm  S/P cataract extraction  Boil of Buttock: 2006 and 2008      FAMILY HISTORY:  Family history of coronary artery disease  Family history of diabetes mellitus (Sibling)      Social History:  denies smoking, etoh or illicit drug use (18 Jun 2020 19:29)    Allergies    lisinopril (Other)      MEDICATIONS  (STANDING):  acetaminophen  IVPB .. 1000 milliGRAM(s) IV Intermittent once  aMIOdarone    Tablet 200 milliGRAM(s) Oral daily  aspirin enteric coated 81 milliGRAM(s) Oral daily  calcium acetate 667 milliGRAM(s) Oral three times a day with meals  chlorhexidine 2% Cloths 1 Application(s) Topical daily  dextrose 5%. 1000 milliLiter(s) (50 mL/Hr) IV Continuous <Continuous>  glycopyrrolate 9 MICROgram(s)/formoterol 4.8 MICROgram(s) Inhaler 2 Puff(s) Inhalation two times a day  heparin   Injectable 6500 Unit(s) IV Push once  heparin  Infusion.  Unit(s)/Hr (15 mL/Hr) IV Continuous <Continuous>  insulin lispro (HumaLOG) corrective regimen sliding scale   SubCutaneous three times a day before meals  insulin lispro (HumaLOG) corrective regimen sliding scale   SubCutaneous at bedtime  ketotifen 0.025% Ophthalmic Solution 1 Drop(s) Both EYES two times a day  lidocaine 1% Injectable 20 milliLiter(s) Local Injection once  lidocaine 2% Gel 1 Application(s) Topical every 8 hours  methimazole 5 milliGRAM(s) Oral daily  metoprolol tartrate 100 milliGRAM(s) Oral two times a day  pantoprazole    Tablet 40 milliGRAM(s) Oral before breakfast  senna 2 Tablet(s) Oral at bedtime  simvastatin 40 milliGRAM(s) Oral at bedtime  tiotropium 18 MICROgram(s) Capsule 1 Capsule(s) Inhalation daily    MEDICATIONS  (PRN):  acetaminophen   Tablet .. 650 milliGRAM(s) Oral every 6 hours PRN Moderate Pain (4 - 6)  ALBUTerol    90 MICROgram(s) HFA Inhaler 2 Puff(s) Inhalation every 6 hours PRN Shortness of Breath and/or Wheezing  heparin   Injectable 6500 Unit(s) IV Push every 6 hours PRN For aPTT less than 40  heparin   Injectable 3000 Unit(s) IV Push every 6 hours PRN For aPTT between 40 - 57      Vital Signs Last 24 Hrs  T(C): 36.3 (01 Jul 2020 07:55), Max: 36.8 (01 Jul 2020 00:16)  T(F): 97.4 (01 Jul 2020 07:55), Max: 98.2 (01 Jul 2020 00:16)  HR: 87 (01 Jul 2020 07:55) (87 - 127)  BP: 109/54 (01 Jul 2020 07:55) (102/63 - 120/75)  BP(mean): --  RR: 20 (01 Jul 2020 07:55) (20 - 24)  SpO2: 97% (01 Jul 2020 07:55) (95% - 100%)  COVID-19 PCR: NotDetec (26 Jun 2020 12:06)    LABS: Reviewed                          11.8   9.20  )-----------( 128      ( 01 Jul 2020 06:11 )             38.8     07-01    135  |  97  |  55<H>  ----------------------------<  143<H>  4.4   |  27  |  9.21<H>    Ca    8.5      01 Jul 2020 06:11      PT/INR - ( 01 Jul 2020 06:11 )   PT: 18.5 sec;   INR: 1.62 ratio         PTT - ( 01 Jul 2020 06:11 )  PTT:33.9 sec      CAPILLARY BLOOD GLUCOSE      POCT Blood Glucose.: 152 mg/dL (01 Jul 2020 11:49)  POCT Blood Glucose.: 143 mg/dL (01 Jul 2020 06:42)  POCT Blood Glucose.: 212 mg/dL (01 Jul 2020 00:25)  POCT Blood Glucose.: 180 mg/dL (30 Jun 2020 21:08)  POCT Blood Glucose.: 132 mg/dL (30 Jun 2020 17:02)    COVID-19 PCR: NotDetec (26 Jun 2020 12:06)            Radiology:    < from: CT Chest No Cont (06.27.20 @ 09:21) >    EXAM:  CT CHEST                            PROCEDURE DATE:  06/27/2020          INTERPRETATION:  CLINICAL INFORMATION: Large left pleural effusion. Cellulitis. End-stage renal disease    COMPARISON: Chest x-ray 6/26/2020. CT chest 1/20/2020.    PROCEDURE:   CT of the Chest was performed without intravenous contrast.  Sagittal and coronal reformats were performed.    FINDINGS:    LUNGS AND AIRWAYS: Patent central airways.  There is a large loculated left pleural effusion which is increased in size since the previous exam. There is associated left lung compressive atelectasis. Minimal dependent atelectatic changes in the right lung.  PLEURA: No pleural effusion.  MEDIASTINUM AND YOANA: No lymphadenopathy.  VESSELS: Atherosclerotic changes. Left subclavian vein stent.  HEART: Cardiomegaly. Coronary artery calcifications No pericardial effusion.  CHEST WALL AND LOWER NECK: Moderately enlarged thyroid gland is within previous study. This would be better evaluated with ultrasound as clinicallyindicated.  VISUALIZED UPPER ABDOMEN: Within normal limits.  BONES: Sternotomy with prominent sternal dehiscence unchanged from the previous exam. No pre- or poststernal fluid collections. Degenerative changes.    IMPRESSION:     There is a large loculated left pleural effusion which is increased in size since the previous exam. There is associated compressive atelectasis.                  LYNDON URIARTE M.D., ATTENDING RADIOLOGIST  This document has been electronically signed. Jun 27 2020  9:28AM              < end of copied text >    < from: Xray Tibia + Fibula 2 Views, Left (06.22.20 @ 10:15) >  EXAM:  LEG AP&LAT - LEFT                            PROCEDURE DATE:  06/22/2020          INTERPRETATION:  CLINICAL INDICATION:  Diabetic ulceration. Evaluate for osteomyelitis.    COMPARISON:  None    TECHNIQUE:  AP and lateral radiographs of the left lower leg performed.    FINDINGS:  There is no evidence for acute or chronic fracture deformity of the left tibia or fibula. No regions of osteolysis or osteosclerosis identified. No soft tissue swelling is identified. No retained radiodense foreign body noted. Extensive vascular calcification identified. The knee and ankle articulations appear intact. Note is made of a small plantar calcaneal spur as well as spurring along the posterior aspect of the calcaneus, superiorly directed.    IMPRESSION:  As above.                  SHEFALI CANALES M.D., ATTENDING RADIOLOGIST  This document has been electronically signed. Jun 22 2020 11:24AM                < end of copied text >    < from: US Duplex Venous Lower Ext Complete, Bilateral (06.20.20 @ 11:32) >    EXAM:  US DPLX LWR EXT VEINS COMPL BI                            PROCEDURE DATE:  06/20/2020          INTERPRETATION:  CLINICAL INFORMATION: Bilateral leg swelling    COMPARISON: Bilateral lower extremity venous duplex 11/16/2012    TECHNIQUE: Duplex sonography of the BILATERAL LOWER extremity veins with color and spectral Doppler, with and without compression.      FINDINGS:    There is normal compressibility of the bilateral common femoral, femoral and popliteal veins.   Doppler examination shows normal spontaneous and phasic flow.    No calf vein thrombosis is detected.    IMPRESSION:   No evidence of deep venous thrombosis in either lower extremity.                    VANNA MAHER M.D., ATTENDING RADIOLOGIST  This document has been electronically signed. Jun 20 2020 11:37AM    < end of copied text >        < from: Xray Tibia + Fibula 2 Views, Left (06.22.20 @ 10:15) >  EXAM:  LEG AP&LAT - LEFT                            PROCEDURE DATE:  06/22/2020          INTERPRETATION:  CLINICAL INDICATION:  Diabetic ulceration. Evaluate for osteomyelitis.    COMPARISON:  None    TECHNIQUE:  AP and lateral radiographs of the left lower leg performed.    FINDINGS:  There is no evidence for acute or chronic fracture deformity of the left tibia or fibula. No regions of osteolysis or osteosclerosis identified. No soft tissue swelling is identified. No retained radiodense foreign body noted. Extensive vascular calcification identified. The knee and ankle articulations appear intact. Note is made of a small plantar calcaneal spur as well as spurring along the posterior aspect of the calcaneus, superiorly directed.    IMPRESSION:  As above.        < end of copied text >        ORT Score -   Family Hx of substance abuse	Female	Male  Alcohol 	                                              1              3  Illegal drugs	                                      2              3  Rx drugs                                               4	      4  Personal Hx of substance abuse		  Alcohol 	                                               3	      3  Illegal drugs                                  	       4	      4  Rx drugs                                                5	      5  Age between 16- 45 years	               1             1  hx preadolescent sexual abuse	       3	      0  Psychological disease		  ADD, OCD, bipolar, schizophrenia	2	      2  Depression                                    	1	      1  Score totals 0 		  		  a score of 3 or lower indicates low risk for opioid abuse		  a score of 4-7 indicates moderate risk for opioid abuse		  a score of 8 or higher indicates high risk for opioid abuse	    Risk factors associated with adverse outcomes related to opioid treatment  [ ]  Concurrent benzodiazepine use  [ ]  History/ Active substance use or alcohol use disorder  [ ] Psychiatric co-morbidity  [ ] Sleep apnea  [ ] COPD  [ ] BMI> 35  [ ] Liver dysfunction  [X ] Renal dysfunction  [ ] CHF  [ ] Smoker  [X ]  Age > 60 years    4AT (Assessment test for delirium & cognitive impairment)  _________________________________________________________  [1] ALERTNESS  This includes patients who may be markedly drowsy (eg. difficult to rouse and/or obviously sleepy  during assessment) or agitated/hyperactive. Observe the patient. If asleep, attempt to wake with  speech or gentle touch on shoulder. Ask the patient to state their name and address to assist rating.  Normal (fully alert, but not agitated, throughout assessment) 0  Mild sleepiness for <10 seconds after waking, then normal 0  Clearly abnormal 4    [2] AMT4  Age, date of birth, place (name of the hospital or building), current year.  No mistakes 0  1 mistake 1  2 or more mistakes/untestable 2    [3] ATTENTION  Ask the patient: “Please tell me the months of the year in backwards order, starting at December.”  To assist initial understanding one prompt of “what is the month before December?” is permitted.  Months of the year backwards Achieves 7 months or more correctly 0  Starts but scores <7 months / refuses to start 1 Untestable (cannot start because unwell, drowsy, inattentive) 2    [4] ACUTE CHANGE OR FLUCTUATING COURSE  Evidence of significant change or fluctuation in: alertness, cognition, other mental function  (eg. paranoia, hallucinations) arising over the last 2 weeks and still evident in last 24hrs  No 0  Yes 4    4 or above: possible delirium +/- cognitive impairment  1-3: possible cognitive impairment  0: delirium or severe cognitive impairment unlikely (but delirium still possible if [4] information incomplete)  4AT SCORE 0    REVIEW OF SYSTEMS:  CONSTITUTIONAL: No fever. Positive fatigue  RESPIRATORY: No cough, wheezing, chills or hemoptysis; No shortness of breath  CARDIOVASCULAR: No chest pain, palpitations, dizziness, or leg swelling  GASTROINTESTINAL: No abdominal or epigastric pain. No nausea, vomiting; No diarrhea or constipation.   GENITOURINARY: anuric + ckd - on hd  MUSCULOSKELETAL:  left leg pain + constant throbbing pain  NEURO: alert and oriented.  Periods of delirium on 6/23  PSYCHIATRIC:  difficulty sleeping    PHYSICAL EXAM:  GENERAL:  Alert & Oriented X3, Sleepy  CHEST/LUNG: Decreased breath sounds  HEART: Regular rate and rhythm; No murmurs, rubs, or gallops  ABDOMEN: Distended, Nontender, Bowel sounds present  : Anuric on HD  EXTREMITIES:  2+ Peripheral Pulses, No cyanosis, or edema; Rt upper arm HD AV fistula site c/d/i positive thrill and bruit   MUSCULOSKELETAL: + left leg moderate edema and tenderness    SKIN: + dried laceration over left shin noted + dried scab over left calf noted + vascular changes noted       [ X]  [place]  Reviewed and Copied to Chart. See below.    Plan of care and goal oriented pain management treatment options were discussed with patient and /or primary care giver; all questions and concerns were addressed and care was aligned with patient's wishes.    Educated patient on goal oriented pain management treatment options     07-01-20 @ 12:05

## 2020-07-01 NOTE — CONSULT NOTE ADULT - PROBLEM SELECTOR RECOMMENDATION 9
Diastolic heart failure and pulmonary hypertension.  Diurese as tolerated. Family reports since CABG in September 2019 pt has had progressive decline cognitively and functionally.  Cardiology following   Discussed disease trajectory with son, risk for re-hospitalizations and declining functional status. suspect delirium due to  metabolic/infectious etiology, hospitalization, consider CT head if no improvement. Reorientation. Avoid opioids, benzos.

## 2020-07-01 NOTE — PROGRESS NOTE ADULT - PROBLEM SELECTOR PLAN 2
HD T/TH/Sat  Pt to get vanco in HD after discharge  Dr. Calle (Nephrology) following HD T/TH/Sat  Last dialysis 6/30   Pt to get vanco in HD after discharge  Dr. Calle (Nephrology) following

## 2020-07-01 NOTE — PROGRESS NOTE ADULT - ASSESSMENT
· Assessment		  69M presents with loculated Lt pleural effusion, stable, now s/p thoracentesis 6/30/20  - f/u cytology and culture of pleural fluid  - DVT prophylaxis, venodynes  - OOB, Ambulating, encourage incentive spirometer  -f/u cxr Hide Include Location In Plan Question?: No Detail Level: Generalized Include Location In Plan?: Yes

## 2020-07-02 LAB
ALBUMIN SERPL ELPH-MCNC: 3.1 G/DL — LOW (ref 3.5–5)
ALP SERPL-CCNC: 79 U/L — SIGNIFICANT CHANGE UP (ref 40–120)
ALT FLD-CCNC: 26 U/L DA — SIGNIFICANT CHANGE UP (ref 10–60)
ANION GAP SERPL CALC-SCNC: 9 MMOL/L — SIGNIFICANT CHANGE UP (ref 5–17)
APTT BLD: 36.5 SEC — HIGH (ref 27.5–35.5)
APTT BLD: 43 SEC — HIGH (ref 27.5–35.5)
AST SERPL-CCNC: 16 U/L — SIGNIFICANT CHANGE UP (ref 10–40)
BILIRUB SERPL-MCNC: 0.6 MG/DL — SIGNIFICANT CHANGE UP (ref 0.2–1.2)
BUN SERPL-MCNC: 75 MG/DL — HIGH (ref 7–18)
CALCIUM SERPL-MCNC: 8.7 MG/DL — SIGNIFICANT CHANGE UP (ref 8.4–10.5)
CHLORIDE SERPL-SCNC: 94 MMOL/L — LOW (ref 96–108)
CO2 SERPL-SCNC: 27 MMOL/L — SIGNIFICANT CHANGE UP (ref 22–31)
CREAT SERPL-MCNC: 10.7 MG/DL — HIGH (ref 0.5–1.3)
GLUCOSE BLDC GLUCOMTR-MCNC: 113 MG/DL — HIGH (ref 70–99)
GLUCOSE BLDC GLUCOMTR-MCNC: 147 MG/DL — HIGH (ref 70–99)
GLUCOSE BLDC GLUCOMTR-MCNC: 171 MG/DL — HIGH (ref 70–99)
GLUCOSE BLDC GLUCOMTR-MCNC: 172 MG/DL — HIGH (ref 70–99)
GLUCOSE BLDC GLUCOMTR-MCNC: 176 MG/DL — HIGH (ref 70–99)
GLUCOSE BLDC GLUCOMTR-MCNC: 187 MG/DL — HIGH (ref 70–99)
GLUCOSE BLDC GLUCOMTR-MCNC: 91 MG/DL — SIGNIFICANT CHANGE UP (ref 70–99)
GLUCOSE SERPL-MCNC: 185 MG/DL — HIGH (ref 70–99)
HCT VFR BLD CALC: 42.8 % — SIGNIFICANT CHANGE UP (ref 39–50)
HGB BLD-MCNC: 12.8 G/DL — LOW (ref 13–17)
INR BLD: 1.53 RATIO — HIGH (ref 0.88–1.16)
MCHC RBC-ENTMCNC: 28.8 PG — SIGNIFICANT CHANGE UP (ref 27–34)
MCHC RBC-ENTMCNC: 29.9 GM/DL — LOW (ref 32–36)
MCV RBC AUTO: 96.2 FL — SIGNIFICANT CHANGE UP (ref 80–100)
NRBC # BLD: 0 /100 WBCS — SIGNIFICANT CHANGE UP (ref 0–0)
PLATELET # BLD AUTO: 167 K/UL — SIGNIFICANT CHANGE UP (ref 150–400)
POTASSIUM SERPL-MCNC: 5.3 MMOL/L — SIGNIFICANT CHANGE UP (ref 3.5–5.3)
POTASSIUM SERPL-SCNC: 5.3 MMOL/L — SIGNIFICANT CHANGE UP (ref 3.5–5.3)
PROT SERPL-MCNC: 7.6 G/DL — SIGNIFICANT CHANGE UP (ref 6–8.3)
PROTHROM AB SERPL-ACNC: 17.5 SEC — HIGH (ref 10.6–13.6)
RBC # BLD: 4.45 M/UL — SIGNIFICANT CHANGE UP (ref 4.2–5.8)
RBC # FLD: 16 % — HIGH (ref 10.3–14.5)
SODIUM SERPL-SCNC: 130 MMOL/L — LOW (ref 135–145)
WBC # BLD: 9.58 K/UL — SIGNIFICANT CHANGE UP (ref 3.8–10.5)
WBC # FLD AUTO: 9.58 K/UL — SIGNIFICANT CHANGE UP (ref 3.8–10.5)

## 2020-07-02 PROCEDURE — 32557 INSERT CATH PLEURA W/ IMAGE: CPT | Mod: LT

## 2020-07-02 RX ORDER — CHLORHEXIDINE GLUCONATE 213 G/1000ML
1 SOLUTION TOPICAL ONCE
Refills: 0 | Status: COMPLETED | OUTPATIENT
Start: 2020-07-02 | End: 2020-07-02

## 2020-07-02 RX ADMIN — Medication 100 MILLIGRAM(S): at 06:21

## 2020-07-02 RX ADMIN — LIDOCAINE 1 APPLICATION(S): 4 CREAM TOPICAL at 06:21

## 2020-07-02 RX ADMIN — KETOTIFEN FUMARATE 1 DROP(S): 0.34 SOLUTION OPHTHALMIC at 06:21

## 2020-07-02 RX ADMIN — Medication 667 MILLIGRAM(S): at 10:00

## 2020-07-02 RX ADMIN — CHLORHEXIDINE GLUCONATE 1 APPLICATION(S): 213 SOLUTION TOPICAL at 06:20

## 2020-07-02 RX ADMIN — AMIODARONE HYDROCHLORIDE 200 MILLIGRAM(S): 400 TABLET ORAL at 06:20

## 2020-07-02 RX ADMIN — LIDOCAINE 1 APPLICATION(S): 4 CREAM TOPICAL at 21:42

## 2020-07-02 RX ADMIN — Medication 250 MILLIGRAM(S): at 11:09

## 2020-07-02 RX ADMIN — KETOTIFEN FUMARATE 1 DROP(S): 0.34 SOLUTION OPHTHALMIC at 22:43

## 2020-07-02 RX ADMIN — GLYCOPYRROLATE AND FORMOTEROL FUMARATE 2 PUFF(S): 9; 4.8 AEROSOL, METERED RESPIRATORY (INHALATION) at 10:02

## 2020-07-02 RX ADMIN — Medication 1: at 06:42

## 2020-07-02 NOTE — PROGRESS NOTE ADULT - ASSESSMENT
Patient is a 69y Male whom presented to the hospital with LT leg pain and swelling.  Was sent from cardiology clinic where he had gone for routine evaluation as was noted  to have RT leg swelling, to R/O DVT. ( doppler US negative for DVT).admitted for treatment of leg cellulitis   Renal consulted for ESRD    A/P  #ESRD HD after chest tube today  # hyponatremia . In a patient with hypervolemia and lung pathology.  fluid restriction 1.2 litres/day.  RPT BMP in AM   # Anemia of CKD. stable off ROSARIO  # lytes and acid base at goal  on mx for bilateral leg cellulitis, Neg DVT.   Plans for chest tube placement

## 2020-07-02 NOTE — PROGRESS NOTE ADULT - PROBLEM SELECTOR PLAN 8
Heparin drip started 7/1  D/C prior to procedure 4am 7/2 Heparin drip started 7/1  D/C prior to procedure 4am 7/2  Hold anticoagulants 12-24 hours post procedure

## 2020-07-02 NOTE — PROGRESS NOTE ADULT - SUBJECTIVE AND OBJECTIVE BOX
Patient denies CP, SOB Review of systems otherwise (-)    acetaminophen   Tablet .. 650 milliGRAM(s) Oral every 6 hours PRN  acetaminophen  IVPB .. 1000 milliGRAM(s) IV Intermittent once  ALBUTerol    90 MICROgram(s) HFA Inhaler 2 Puff(s) Inhalation every 6 hours PRN  aMIOdarone    Tablet 200 milliGRAM(s) Oral daily  aspirin enteric coated 81 milliGRAM(s) Oral daily  calcium acetate 667 milliGRAM(s) Oral three times a day with meals  chlorhexidine 2% Cloths 1 Application(s) Topical daily  dextrose 5%. 1000 milliLiter(s) IV Continuous <Continuous>  glycopyrrolate 9 MICROgram(s)/formoterol 4.8 MICROgram(s) Inhaler 2 Puff(s) Inhalation two times a day  heparin   Injectable 6500 Unit(s) IV Push every 6 hours PRN  heparin   Injectable 3000 Unit(s) IV Push every 6 hours PRN  heparin  Infusion.  Unit(s)/Hr IV Continuous <Continuous>  insulin lispro (HumaLOG) corrective regimen sliding scale   SubCutaneous three times a day before meals  insulin lispro (HumaLOG) corrective regimen sliding scale   SubCutaneous at bedtime  ketotifen 0.025% Ophthalmic Solution 1 Drop(s) Both EYES two times a day  lidocaine 1% Injectable 20 milliLiter(s) Local Injection once  lidocaine 2% Gel 1 Application(s) Topical every 8 hours  methimazole 5 milliGRAM(s) Oral daily  metoprolol tartrate 100 milliGRAM(s) Oral two times a day  pantoprazole    Tablet 40 milliGRAM(s) Oral before breakfast  senna 2 Tablet(s) Oral at bedtime  simvastatin 40 milliGRAM(s) Oral at bedtime  tiotropium 18 MICROgram(s) Capsule 1 Capsule(s) Inhalation daily                            12.8   9.58  )-----------( 167      ( 02 Jul 2020 05:48 )             42.8       Hemoglobin: 12.8 g/dL (07-02 @ 05:48)  Hemoglobin: 12.2 g/dL (07-01 @ 18:19)  Hemoglobin: 11.8 g/dL (07-01 @ 06:11)  Hemoglobin: 13.1 g/dL (06-30 @ 09:03)  Hemoglobin: 12.8 g/dL (06-29 @ 06:13)      07-02    130<L>  |  94<L>  |  75<H>  ----------------------------<  185<H>  5.3   |  27  |  10.70<H>    Ca    8.7      02 Jul 2020 05:48    TPro  7.6  /  Alb  3.1<L>  /  TBili  0.6  /  DBili  x   /  AST  16  /  ALT  26  /  AlkPhos  79  07-02    Creatinine Trend: 10.70<--, 9.21<--, 11.90<--, 10.20<--, 8.35<--, 10.20<--    COAGS: PT/INR - ( 02 Jul 2020 05:48 )   PT: 17.5 sec;   INR: 1.53 ratio         PTT - ( 02 Jul 2020 05:48 )  PTT:36.5 sec          T(C): 36.3 (07-02-20 @ 20:20), Max: 36.6 (07-02-20 @ 16:00)  HR: 84 (07-02-20 @ 20:20) (67 - 117)  BP: 149/51 (07-02-20 @ 20:20) (100/56 - 149/51)  RR: 22 (07-02-20 @ 20:20) (0 - 24)  SpO2: 100% (07-02-20 @ 20:20) (92% - 100%)  Wt(kg): --    I&O's Summary    02 Jul 2020 07:01  -  02 Jul 2020 22:49  --------------------------------------------------------  IN: 410 mL / OUT: 3225 mL / NET: -2815 mL        Gen: Appears well in NAD  HEENT:  (-)icterus (-)pallor  CV: N S1 S2 1/6 DEJAH (+)2 Pulses B/l  Resp:  Clear to ausculatation B/L, decreased at left base normal effort  GI: (+) BS Soft, NT, ND  Lymph:  (+) minimal Edema, (-)obvious lymphadenopathy  Skin: Warm to touch, Normal turgor  Psych: Appropriate mood and affect      ASSESSMENT/PLAN: 69y Male  CAD s/p multiple stents, s/p CABG (9/2019 course c/b Afib and LT pleural effusion), Normal LV systolic function, severe Diastolic, RV dysfx CHF, HTN, HLD, DM2, Afib on coumadin and ESRD on HD (Tu, Thurs, Sat at [facility]), hyperthyroidism, presented to ER for pain and swelling of left leg since 3 days.    - LE dopplers negative for dVT   - s/p thoracentesis yesterday, for Pig tail with IR today  - Resume AC when ok with team  - Goal INR 2-3  - Plan d/w with son in detail, ([phone].  He has filed a request for longer visitation hours.      Murphy Colin MD, [facility]  BEEPER [phone]

## 2020-07-02 NOTE — PROGRESS NOTE ADULT - PROBLEM SELECTOR PLAN 4
Rate controlled   HR- 67  Started on Heparin drip   D/C @4am prior to procedure   Follow up PT/INR in AM

## 2020-07-02 NOTE — PROGRESS NOTE ADULT - SUBJECTIVE AND OBJECTIVE BOX
Patient seen and examined at bedside  Heparin drip on hold    Vital Signs Last 24 Hrs  T(F): 97.3 (07-02-20 @ 00:02), Max: 98 (07-01-20 @ 15:49)  HR: 117 (07-02-20 @ 06:00)  BP: 110/74 (07-02-20 @ 06:00)  RR: 24 (07-02-20 @ 00:02)  SpO2: 98% (07-02-20 @ 00:02)  POCT Blood Glucose.: 187 mg/dL (02 Jul 2020 07:54)    GENERAL: Alert, NAD  CHEST/LUNG:  respirations nonlabored    I&O's Detail    LABS:                        12.8   9.58  )-----------( 167      ( 02 Jul 2020 05:48 )             42.8     07-02    130<L>  |  94<L>  |  75<H>  ----------------------------<  185<H>  5.3   |  27  |  10.70<H>    Ca    8.7      02 Jul 2020 05:48    TPro  7.6  /  Alb  3.1<L>  /  TBili  0.6  /  DBili  x   /  AST  16  /  ALT  26  /  AlkPhos  79  07-02    PT/INR - ( 02 Jul 2020 05:48 )   PT: 17.5 sec;   INR: 1.53 ratio       PTT - ( 02 Jul 2020 05:48 )  PTT:36.5 sec    RADIOLOGY & ADDITIONAL STUDIES:  < from: Xray Chest 1 View- PORTABLE-Urgent (06.30.20 @ 17:01) >  EXAM:  XR CHEST PORTABLE URGENT 1V                            PROCEDURE DATE:  06/30/2020          INTERPRETATION:  TIME OF EXAM: June 30, 2020 at 4:54 PM.    CLINICAL INFORMATION: Status post bedside thoracentesis.    COMPARISON:  June 30, 2020 at2:24 PM.    TECHNIQUE:   AP Portable chest x-ray. Limited by rotation.    INTERPRETATION:     Heart size and the mediastinum cannot be accurately evaluated on this projection. Median sternotomy sutures, surgical clips, and abandoned epicardial pacemaker leads are again seen.  Note is again made of a discontinuous vascular stent in the left subclavian and brachiocephalic region.  The right lung is clear. No right pleural effusion is seen.  A large loculated left pleural effusion with likely associated passive atelectasis, appears slightly smaller. No pneumothorax is seen.      IMPRESSION:  No pneumothorax seen.    Large loculated left pleural effusion with likely associated passive atelectasis, slightly smaller.    Note again made of a discontinuous vascular stent in the left subclavian and brachiocephalic region.    KWASI CAMARENA M.D., ATTENDING RADIOLOGIST  This document has been electronically signed. Jun 30 2020  5:07PM        < end of copied text >

## 2020-07-02 NOTE — PROGRESS NOTE ADULT - PROBLEM SELECTOR PLAN 3
CT noted above  Pigtail to be placed by IR 7/2  Pt satting well on supplemental O2  Dr. Delaney (Pulmonary) following  CT surgery following

## 2020-07-02 NOTE — PROGRESS NOTE ADULT - SUBJECTIVE AND OBJECTIVE BOX
[facility] : Progress Note :: [phone], (office [phone]),   Dr Calle / Dr Esposito / Dr Macias / Dr Mendiola / Dr Narendra JULIEN / Dr Arroyo / Dr Knight / Dr Reann pickens  _____________________________________________________________________________________________    plan for chest tube today    lisinopril (Other)  opioid-like analgesics (Other)    Hospital Medications:   MEDICATIONS  (STANDING):  acetaminophen  IVPB .. 1000 milliGRAM(s) IV Intermittent once  aMIOdarone    Tablet 200 milliGRAM(s) Oral daily  aspirin enteric coated 81 milliGRAM(s) Oral daily  calcium acetate 667 milliGRAM(s) Oral three times a day with meals  chlorhexidine 2% Cloths 1 Application(s) Topical daily  dextrose 5%. 1000 milliLiter(s) (50 mL/Hr) IV Continuous <Continuous>  glycopyrrolate 9 MICROgram(s)/formoterol 4.8 MICROgram(s) Inhaler 2 Puff(s) Inhalation two times a day  heparin  Infusion.  Unit(s)/Hr (15 mL/Hr) IV Continuous <Continuous>  insulin lispro (HumaLOG) corrective regimen sliding scale   SubCutaneous three times a day before meals  insulin lispro (HumaLOG) corrective regimen sliding scale   SubCutaneous at bedtime  ketotifen 0.025% Ophthalmic Solution 1 Drop(s) Both EYES two times a day  lidocaine 1% Injectable 20 milliLiter(s) Local Injection once  lidocaine 2% Gel 1 Application(s) Topical every 8 hours  methimazole 5 milliGRAM(s) Oral daily  metoprolol tartrate 100 milliGRAM(s) Oral two times a day  pantoprazole    Tablet 40 milliGRAM(s) Oral before breakfast  senna 2 Tablet(s) Oral at bedtime  simvastatin 40 milliGRAM(s) Oral at bedtime  tiotropium 18 MICROgram(s) Capsule 1 Capsule(s) Inhalation daily        VITALS:  T(F): 97.5 (07-02-20 @ 15:30), Max: 97.5 (07-02-20 @ 08:00)  HR: 101 (07-02-20 @ 15:30)  BP: 109/82 (07-02-20 @ 15:30)  RR: 9 (07-02-20 @ 15:30)  SpO2: 99% (07-02-20 @ 15:30)  Wt(kg): --    07-02 @ 07:01  -  07-02 @ 16:51  --------------------------------------------------------  IN: 10 mL / OUT: 700 mL / NET: -690 mL        PHYSICAL EXAM:  Constitutional: NAD  HEENT: anicteric sclera, oropharynx clear, MMM  Neck: No JVD  Respiratory: CTAB, no wheezes, rales or rhonchi  Cardiovascular: S1, S2, RRR  Gastrointestinal: BS+, soft, NT/ND  Extremities: No cyanosis or clubbing. No peripheral edema  Neurological: A/O x 3, no focal deficits  Vascular Access: AVF with thrill and bruit    LABS:  07-02    130<L>  |  94<L>  |  75<H>  ----------------------------<  185<H>  5.3   |  27  |  10.70<H>    Ca    8.7      02 Jul 2020 05:48    TPro  7.6  /  Alb  3.1<L>  /  TBili  0.6  /  DBili      /  AST  16  /  ALT  26  /  AlkPhos  79  07-02    Creatinine Trend: 10.70 <--, 9.21 <--, 11.90 <--, 10.20 <--, 8.35 <--, 10.20 <--, 8.56 <--                        12.8   9.58  )-----------( 167      ( 02 Jul 2020 05:48 )             42.8     Urine Studies:      RADIOLOGY & ADDITIONAL STUDIES:

## 2020-07-02 NOTE — PROGRESS NOTE ADULT - SUBJECTIVE AND OBJECTIVE BOX
Time of Visit:  Patient seen and examined.     MEDICATIONS  (STANDING):  acetaminophen  IVPB .. 1000 milliGRAM(s) IV Intermittent once  aMIOdarone    Tablet 200 milliGRAM(s) Oral daily  aspirin enteric coated 81 milliGRAM(s) Oral daily  calcium acetate 667 milliGRAM(s) Oral three times a day with meals  chlorhexidine 2% Cloths 1 Application(s) Topical daily  dextrose 5%. 1000 milliLiter(s) (50 mL/Hr) IV Continuous <Continuous>  glycopyrrolate 9 MICROgram(s)/formoterol 4.8 MICROgram(s) Inhaler 2 Puff(s) Inhalation two times a day  heparin  Infusion.  Unit(s)/Hr (15 mL/Hr) IV Continuous <Continuous>  insulin lispro (HumaLOG) corrective regimen sliding scale   SubCutaneous three times a day before meals  insulin lispro (HumaLOG) corrective regimen sliding scale   SubCutaneous at bedtime  ketotifen 0.025% Ophthalmic Solution 1 Drop(s) Both EYES two times a day  lidocaine 1% Injectable 20 milliLiter(s) Local Injection once  lidocaine 2% Gel 1 Application(s) Topical every 8 hours  methimazole 5 milliGRAM(s) Oral daily  metoprolol tartrate 100 milliGRAM(s) Oral two times a day  pantoprazole    Tablet 40 milliGRAM(s) Oral before breakfast  senna 2 Tablet(s) Oral at bedtime  simvastatin 40 milliGRAM(s) Oral at bedtime  tiotropium 18 MICROgram(s) Capsule 1 Capsule(s) Inhalation daily      MEDICATIONS  (PRN):  acetaminophen   Tablet .. 650 milliGRAM(s) Oral every 6 hours PRN Moderate Pain (4 - 6)  ALBUTerol    90 MICROgram(s) HFA Inhaler 2 Puff(s) Inhalation every 6 hours PRN Shortness of Breath and/or Wheezing  heparin   Injectable 6500 Unit(s) IV Push every 6 hours PRN For aPTT less than 40  heparin   Injectable 3000 Unit(s) IV Push every 6 hours PRN For aPTT between 40 - 57       Medications up to date at time of exam.      PHYSICAL EXAMINATION:  Patient has no new complaints.  GENERAL: The patient is a well-developed, well-nourished, in no apparent distress.     Vital Signs Last 24 Hrs  T(C): 36.4 (02 Jul 2020 08:00), Max: 36.7 (01 Jul 2020 15:49)  T(F): 97.5 (02 Jul 2020 08:00), Max: 98 (01 Jul 2020 15:49)  HR: 67 (02 Jul 2020 08:00) (67 - 117)  BP: 100/56 (02 Jul 2020 08:00) (99/63 - 113/80)  BP(mean): --  RR: 24 (02 Jul 2020 08:00) (22 - 24)  SpO2: 96% (02 Jul 2020 08:00) (70% - 98%)   (if applicable)    Chest Tube (if applicable)    HEENT: Head is normocephalic and atraumatic. Extraocular muscles are intact. Mucous membranes are moist.     NECK: Supple, no palpable adenopathy.    LUNGS: Clear to auscultation, no wheezing, rales, or rhonchi.    HEART: Regular rate and rhythm without murmur.    ABDOMEN: Soft, nontender, and nondistended.  No hepatosplenomegaly is noted.    : No painful voiding, no pelvic pain    EXTREMITIES: Without any cyanosis, clubbing, rash, lesions or edema.    NEUROLOGIC: Awake, alert, oriented, grossly intact    SKIN: Warm, dry, good turgor.      LABS:                        12.8   9.58  )-----------( 167      ( 02 Jul 2020 05:48 )             42.8     07-02    130<L>  |  94<L>  |  75<H>  ----------------------------<  185<H>  5.3   |  27  |  10.70<H>    Ca    8.7      02 Jul 2020 05:48    TPro  7.6  /  Alb  3.1<L>  /  TBili  0.6  /  DBili  x   /  AST  16  /  ALT  26  /  AlkPhos  79  07-02    PT/INR - ( 02 Jul 2020 05:48 )   PT: 17.5 sec;   INR: 1.53 ratio         PTT - ( 02 Jul 2020 05:48 )  PTT:36.5 sec                    MICROBIOLOGY: (if applicable)    RADIOLOGY & ADDITIONAL STUDIES:  EKG:   CXR:  ECHO:    IMPRESSION: 69y Male PAST MEDICAL & SURGICAL HISTORY:  ESRD (end stage renal disease) on dialysis: T-Th-Sat  Stented coronary artery: total 15 stents from 9680-4037  Hyperthyroidism  H/O: CVA: after cardiac stent placed 12/15/09 -no residual  Heart Attack: 2/1/07 with subsequent stent  Coronary Stent: [id number] 10 stents,  to Ramus 1/2015, cath 01/2016 ( see results in HPI)  Hypercholesterolemia  CAD (Coronary Artery Disease)  DM (Diabetes Mellitus): x 4 yrs without N/N/R  HTN (Hypertension)  S/P CABG x 4  Hemodialysis access, AV graft: 5/2015, L arm  S/P cataract extraction  Boil of Buttock: 2006 and 2008   p/w           RECOMMENDATIONS: Time of Visit:  Patient seen and examined.     MEDICATIONS  (STANDING):  acetaminophen  IVPB .. 1000 milliGRAM(s) IV Intermittent once  aMIOdarone    Tablet 200 milliGRAM(s) Oral daily  aspirin enteric coated 81 milliGRAM(s) Oral daily  calcium acetate 667 milliGRAM(s) Oral three times a day with meals  chlorhexidine 2% Cloths 1 Application(s) Topical daily  dextrose 5%. 1000 milliLiter(s) (50 mL/Hr) IV Continuous <Continuous>  glycopyrrolate 9 MICROgram(s)/formoterol 4.8 MICROgram(s) Inhaler 2 Puff(s) Inhalation two times a day  heparin  Infusion.  Unit(s)/Hr (15 mL/Hr) IV Continuous <Continuous>  insulin lispro (HumaLOG) corrective regimen sliding scale   SubCutaneous three times a day before meals  insulin lispro (HumaLOG) corrective regimen sliding scale   SubCutaneous at bedtime  ketotifen 0.025% Ophthalmic Solution 1 Drop(s) Both EYES two times a day  lidocaine 1% Injectable 20 milliLiter(s) Local Injection once  lidocaine 2% Gel 1 Application(s) Topical every 8 hours  methimazole 5 milliGRAM(s) Oral daily  metoprolol tartrate 100 milliGRAM(s) Oral two times a day  pantoprazole    Tablet 40 milliGRAM(s) Oral before breakfast  senna 2 Tablet(s) Oral at bedtime  simvastatin 40 milliGRAM(s) Oral at bedtime  tiotropium 18 MICROgram(s) Capsule 1 Capsule(s) Inhalation daily      MEDICATIONS  (PRN):  acetaminophen   Tablet .. 650 milliGRAM(s) Oral every 6 hours PRN Moderate Pain (4 - 6)  ALBUTerol    90 MICROgram(s) HFA Inhaler 2 Puff(s) Inhalation every 6 hours PRN Shortness of Breath and/or Wheezing  heparin   Injectable 6500 Unit(s) IV Push every 6 hours PRN For aPTT less than 40  heparin   Injectable 3000 Unit(s) IV Push every 6 hours PRN For aPTT between 40 - 57       Medications up to date at time of exam.      PHYSICAL EXAMINATION:  Patient has no new complaints.  GENERAL: The patient is a well-developed, well-nourished, in no apparent distress.     Vital Signs Last 24 Hrs  T(C): 36.4 (02 Jul 2020 08:00), Max: 36.7 (01 Jul 2020 15:49)  T(F): 97.5 (02 Jul 2020 08:00), Max: 98 (01 Jul 2020 15:49)  HR: 67 (02 Jul 2020 08:00) (67 - 117)  BP: 100/56 (02 Jul 2020 08:00) (99/63 - 113/80)  BP(mean): --  RR: 24 (02 Jul 2020 08:00) (22 - 24)  SpO2: 96% (02 Jul 2020 08:00) (70% - 98%)   (if applicable)    Chest Tube (if applicable)    HEENT: Head is normocephalic and atraumatic. Extraocular muscles are intact. Mucous membranes are moist.     NECK: Supple, no palpable adenopathy.    LUNGS: Clear to auscultation, no wheezing, rales, or rhonchi.    HEART: Regular rate and rhythm without murmur.    ABDOMEN: Soft, nontender, and nondistended.  No hepatosplenomegaly is noted.    : No painful voiding, no pelvic pain    EXTREMITIES: Without any cyanosis, clubbing, rash, lesions or edema.    NEUROLOGIC: Awake, alert, oriented, grossly intact    SKIN: Warm, dry, good turgor.      LABS:                        12.8   9.58  )-----------( 167      ( 02 Jul 2020 05:48 )             42.8     07-02    130<L>  |  94<L>  |  75<H>  ----------------------------<  185<H>  5.3   |  27  |  10.70<H>    Ca    8.7      02 Jul 2020 05:48    TPro  7.6  /  Alb  3.1<L>  /  TBili  0.6  /  DBili  x   /  AST  16  /  ALT  26  /  AlkPhos  79  07-02    PT/INR - ( 02 Jul 2020 05:48 )   PT: 17.5 sec;   INR: 1.53 ratio         PTT - ( 02 Jul 2020 05:48 )  PTT:36.5 sec                    MICROBIOLOGY: (if applicable)    RADIOLOGY & ADDITIONAL STUDIES:  EKG:   CXR:  ECHO:    IMPRESSION: 69y Male PAST MEDICAL & SURGICAL HISTORY:  ESRD (end stage renal disease) on dialysis: T-Th-Sat  Stented coronary artery: total 15 stents from 8869-0994  Hyperthyroidism  H/O: CVA: after cardiac stent placed 12/15/09 -no residual  Heart Attack: 2/1/07 with subsequent stent  Coronary Stent: [id number] 10 stents,  to Ramus 1/2015, cath 01/2016 ( see results in HPI)  Hypercholesterolemia  CAD (Coronary Artery Disease)  DM (Diabetes Mellitus): x 4 yrs without N/N/R  HTN (Hypertension)  S/P CABG x 4  Hemodialysis access, AV graft: 5/2015, L arm  S/P cataract extraction  Boil of Buttock: 2006 and 2008   p/w           IMP: This is a  69 man  with CAD s/p multiple stents, s/p CABG (9/2019 course c/b Afib and LT pleural effusion), Diastolic CHF, HTN, HLD, DM2, Afib on coumadin and ESRD on HD (Tu, Thurs, Sat at [facility]), hyperthyroidism, presented to ER for pain and swelling of left leg since 3 days. Admitted to medicine for LLE cellulitis.  CXR shows gross heart enlargement and mild to moderate left base pleural pulmonary reaction again noted. Sternotomy again seen. Negative for DVT on Doppler study. Blood Cx NGTD. Pt was started on Unasyn and po Doxy, but changed to Vancomycin due to non healing cellulitis. ID consulted.  Improved leg swelling and erythema with Vanco.   Pt. with ESRD on HD, followed by nephro Dr. Calle, HD tolerated. Pt. with significant cardiac hx including AF on Coumadin, followed by cardiology. Coumadin dose adjusted for goal INR 2-3. ECHO resulted EF 40-45% (previous EF 55% in Jan. 2020.  CT chest reveal enlarging left loculated pleural effusion. Pat is not in any resp distress and O2 sat 100 % 2 LPM via NC. He will require chest tube placement but INR is elevated due to coumadin. Thoracic surgery unable to place chest tube, s/p thoracentesis 6/30 .. 150 ml . Post procedure cxr neg for ptx        Sugg;  -resume therapeutic anticoag with hep and coumadin  -f/u pleural fluid studies  -dialysis as per neph  -continue O2 via NC as needed  -IR for chest tube placement

## 2020-07-02 NOTE — CHART NOTE - NSCHARTNOTEFT_GEN_A_CORE
EVENT: Pt returned to unit from HD with pigtail drainage catheter to left pleural Chest tube attached to Pleur-Evac. Per nurse she received  s/o to clamp CT if level at 1000 ml.   Per IR note HOLD therapeutic anticoagulation for 12-24 hour. Pt in no distress at present.  Vital Signs Last 24 Hrs  T(C): 36.6 (02 Jul 2020 16:00), Max: 36.6 (02 Jul 2020 16:00)  T(F): 97.9 (02 Jul 2020 16:00), Max: 97.9 (02 Jul 2020 16:00)  HR: 97 (02 Jul 2020 16:00) (67 - 117)  BP: 109/78 (02 Jul 2020 16:00) (100/56 - 113/80)  BP(mean): 92 (02 Jul 2020 15:30) (90 - 92)  RR: 20 (02 Jul 2020 16:00) (0 - 24)  SpO2: 97% (02 Jul 2020 16:00) (92% - 100%) EVENT: Pt returned to unit from HD with pigtail drainage catheter to left pleural Chest tube attached to Pleur-Evac. Per nurse she received  s/o to clamp CT if level at 1000 ml.   Per IR note HOLD therapeutic anticoagulation for 12-24 hour. Pt in no distress at present.    BRIEF HPI: 69M with CAD s/p multiple stents, s/p CABG (9/2019 course c/b Afib and LT pleural effusion), Diastolic CHF, HTN, HLD, DM2, Afib on coumadin and ESRD on HD (Tu, Thurs, Sat at [facility]), hyperthyroidism, presented to ER for pain and swelling of left leg since 3 days. Admitted to medicine for LLE cellulitis. CT found large L loculated pleural effusion, recommending chest tube. Bedside thoracentesis drained 150cc of blood tinged pleural fluid. Specimen sent for pathology and culture. Attempt to place left pig tail catheter by Dr. Torres unsuccessful. Pigtail to CT placed today in IR. HD completed today.    Vital Signs Last 24 Hrs  T(C): 36.3 (02 Jul 2020 20:20), Max: 36.6 (02 Jul 2020 16:00)  T(F): 97.3 (02 Jul 2020 20:20), Max: 97.9 (02 Jul 2020 16:00)  HR: 84 (02 Jul 2020 20:20) (67 - 117)  BP: 149/51 (02 Jul 2020 20:20) (100/56 - 149/51)  BP(mean): 92 (02 Jul 2020 15:30) (90 - 92)  RR: 22 (02 Jul 2020 20:20) (0 - 24)  SpO2: 100% (02 Jul 2020 20:20) (92% - 100%)

## 2020-07-02 NOTE — PROGRESS NOTE ADULT - SUBJECTIVE AND OBJECTIVE BOX
Interventional Radiology Pre-Procedure Note    Procedure: Left chest tube placement    Diagnosis/Indication: Patient is a 69y old  Male with moderate loculated left pleural effusion. Chest tube placement was requested.    PAST MEDICAL & SURGICAL HISTORY:  ESRD (end stage renal disease) on dialysis: T-Th-Sat  Stented coronary artery: total 15 stents from 8492-9709  Hyperthyroidism  H/O: CVA: after cardiac stent placed 12/15/09 -no residual  Heart Attack: 2/1/07 with subsequent stent  Coronary Stent: 3816-1600 10 stents,  to Ramus 1/2015, cath 01/2016 ( see results in HPI)  Hypercholesterolemia  CAD (Coronary Artery Disease)  DM (Diabetes Mellitus): x 4 yrs without N/N/R  HTN (Hypertension)  S/P CABG x 4  Hemodialysis access, AV graft: 5/2015, L arm  S/P cataract extraction  Boil of Buttock: 2006 and 2008    Allergies: lisinopril (Other)  opioid-like analgesics (Other)    LABS:  CBC Full  -  ( 02 Jul 2020 05:48 )  WBC Count : 9.58 K/uL  RBC Count : 4.45 M/uL  Hemoglobin : 12.8 g/dL  Hematocrit : 42.8 %  Platelet Count - Automated : 167 K/uL  Mean Cell Volume : 96.2 fl  Mean Cell Hemoglobin : 28.8 pg  Mean Cell Hemoglobin Concentration : 29.9 gm/dL    07-02    130<L>  |  94<L>  |  75<H>  ----------------------------<  185<H>  5.3   |  27  |  10.70<H>    Ca    8.7      02 Jul 2020 05:48    TPro  7.6  /  Alb  3.1<L>  /  TBili  0.6  /  DBili  x   /  AST  16  /  ALT  26  /  AlkPhos  79  07-02    PT/INR - ( 02 Jul 2020 05:48 )   PT: 17.5 sec;   INR: 1.53 ratio      PTT - ( 02 Jul 2020 05:48 )  PTT:36.5 sec    Procedure/ risks/ benefits/ alternatives were discussed in detail with the patient's son Randal Hardin ([phone]), who verbalizes understanding, and witnessed informed consent was obtained.

## 2020-07-02 NOTE — PROGRESS NOTE ADULT - PROBLEM SELECTOR PLAN 7
A1c 7.1   Continue with sliding scale insulin  Continue with glucose monitoring  Continue with diabetic diet

## 2020-07-02 NOTE — PROGRESS NOTE ADULT - ASSESSMENT
69M presents with loculated Lt pleural effusion, stable, now s/p thoracentesis 6/30/20    - heparin drip on hold  - plan for IR pigtail today  - monitor AM CXR  - continue medical management

## 2020-07-02 NOTE — PROGRESS NOTE ADULT - SUBJECTIVE AND OBJECTIVE BOX
NP Note discussed with  primary attending    69M with CAD s/p multiple stents, s/p CABG (9/2019 course c/b Afib and LT pleural effusion), Diastolic CHF, HTN, HLD, DM2, Afib on coumadin and ESRD on HD (Tu, Thurs, Sat at [facility]), hyperthyroidism, presented to ER for pain and swelling of left leg since 3 days. Admitted to medicine for LLE cellulitis. CT found large L loculated pleural effusion, recommending chest tube. Bedside thoracentesis drained 150cc of blood tinged pleural fluid. Specimen sent for pathology and culture. Attempt to place left pig tail catheter by Dr. Torres unsuccessful. Plan for IR today for placement of pigtail. Heparin drip on hold since 4am for procedure, NPO after midnight for procedure.     INTERVAL HPI/OVERNIGHT EVENTS: no new complaints    MEDICATIONS  (STANDING):  acetaminophen  IVPB .. 1000 milliGRAM(s) IV Intermittent once  aMIOdarone    Tablet 200 milliGRAM(s) Oral daily  aspirin enteric coated 81 milliGRAM(s) Oral daily  calcium acetate 667 milliGRAM(s) Oral three times a day with meals  chlorhexidine 2% Cloths 1 Application(s) Topical daily  dextrose 5%. 1000 milliLiter(s) (50 mL/Hr) IV Continuous <Continuous>  glycopyrrolate 9 MICROgram(s)/formoterol 4.8 MICROgram(s) Inhaler 2 Puff(s) Inhalation two times a day  heparin  Infusion.  Unit(s)/Hr (15 mL/Hr) IV Continuous <Continuous>  insulin lispro (HumaLOG) corrective regimen sliding scale   SubCutaneous three times a day before meals  insulin lispro (HumaLOG) corrective regimen sliding scale   SubCutaneous at bedtime  ketotifen 0.025% Ophthalmic Solution 1 Drop(s) Both EYES two times a day  lidocaine 1% Injectable 20 milliLiter(s) Local Injection once  lidocaine 2% Gel 1 Application(s) Topical every 8 hours  methimazole 5 milliGRAM(s) Oral daily  metoprolol tartrate 100 milliGRAM(s) Oral two times a day  pantoprazole    Tablet 40 milliGRAM(s) Oral before breakfast  senna 2 Tablet(s) Oral at bedtime  simvastatin 40 milliGRAM(s) Oral at bedtime  tiotropium 18 MICROgram(s) Capsule 1 Capsule(s) Inhalation daily    MEDICATIONS  (PRN):  acetaminophen   Tablet .. 650 milliGRAM(s) Oral every 6 hours PRN Moderate Pain (4 - 6)  ALBUTerol    90 MICROgram(s) HFA Inhaler 2 Puff(s) Inhalation every 6 hours PRN Shortness of Breath and/or Wheezing  heparin   Injectable 6500 Unit(s) IV Push every 6 hours PRN For aPTT less than 40  heparin   Injectable 3000 Unit(s) IV Push every 6 hours PRN For aPTT between 40 - 57      __________________________________________________  REVIEW OF SYSTEMS: unable to participate due to mental status     Vital Signs Last 24 Hrs  T(C): 36.4 (02 Jul 2020 08:00), Max: 36.7 (01 Jul 2020 15:49)  T(F): 97.5 (02 Jul 2020 08:00), Max: 98 (01 Jul 2020 15:49)  HR: 67 (02 Jul 2020 08:00) (67 - 117)  BP: 100/56 (02 Jul 2020 08:00) (99/63 - 113/80)  BP(mean): --  RR: 24 (02 Jul 2020 08:00) (22 - 24)  SpO2: 96% (02 Jul 2020 08:00) (70% - 98%)    ________________________________________________  PHYSICAL EXAM:  GENERAL: Chronically ill appears, somnlent - NAD  HEENT: Normocephalic;  conjunctivae and sclerae clear;  NECK : supple  CHEST/LUNG: LLL decreased lung sounds, +left sided dullness  HEART: S1 S2  regular; no murmurs, gallops or rubs  ABDOMEN: Distended but soft and nontender   EXTREMITIES: Left upper arm AVF. no cyanosis; no edema; no calf tenderness  SKIN: warm and dry; no rash  NERVOUS SYSTEM: Somnolent, arouses to tactile stimuli; disoriented at baseline.  _________________________________________________  LABS:                        12.8   9.58  )-----------( 167      ( 02 Jul 2020 05:48 )             42.8     07-02    130<L>  |  94<L>  |  75<H>  ----------------------------<  185<H>  5.3   |  27  |  10.70<H>    Ca    8.7      02 Jul 2020 05:48    TPro  7.6  /  Alb  3.1<L>  /  TBili  0.6  /  DBili  x   /  AST  16  /  ALT  26  /  AlkPhos  79  07-02    PT/INR - ( 02 Jul 2020 05:48 )   PT: 17.5 sec;   INR: 1.53 ratio         PTT - ( 02 Jul 2020 05:48 )  PTT:36.5 sec    CAPILLARY BLOOD GLUCOSE    POCT Blood Glucose.: 176 mg/dL (02 Jul 2020 11:31)  POCT Blood Glucose.: 187 mg/dL (02 Jul 2020 07:54)  POCT Blood Glucose.: 172 mg/dL (02 Jul 2020 06:33)  POCT Blood Glucose.: 147 mg/dL (02 Jul 2020 00:55)  POCT Blood Glucose.: 182 mg/dL (01 Jul 2020 21:02)  POCT Blood Glucose.: 228 mg/dL (01 Jul 2020 16:41)        RADIOLOGY & ADDITIONAL TESTS:    Consultant(s) Notes Reviewed:   YES    Plan of care was discussed with patient and /or primary care giver; all questions and concerns were addressed and care was aligned with patient's wishes.

## 2020-07-02 NOTE — PROGRESS NOTE ADULT - PROBLEM SELECTOR PLAN 1
Improving  Afebrile  WBC 9.5  Completed Vanco  Dopplers negative   Pain management following, avoid opiates per family   Dr. Rayo (ID) following

## 2020-07-02 NOTE — PROGRESS NOTE ADULT - SUBJECTIVE AND OBJECTIVE BOX
Interventional Radiology Brief - Operative Note    Procedure: Left chest tube placement    Operators: MYA Conley MD, BILLY Tejada    Anesthesia (type): MAC and local    Contrast: none    EBL: < 1 mL    Findings/Follow up Plan of Care: Preliminary US of left chest demonstrated moderate to large left pleural effusion. 10.2 Fr pigtail drainage catheter was placed into left pleural fluid with image guidance. 80 mL of serosanguinous fluid aspirated with sample sent for culture, as requested. Follow up results. Chest tube attached to Pleur-Evac device. Further management as per CT surgery.    Specimens Removed: as above    Implants: as above    Complications: none    Condition/Disposition: stable / PACU x 1 hour, then back to floor IF cleared by anesthesia criteria.    Miscellaneous: HOLD therapeutic anticoagulation for 12-24 hours.    Please call Interventional Radiology with any questions, concerns, or issues.      Official report to follow.

## 2020-07-02 NOTE — PROGRESS NOTE ADULT - ATTENDING COMMENTS
HPI:  69M with CAD s/p multiple stents, s/p CABG (9/2019 course c/b Afib and LT pleural effusion), Diastolic CHF, HTN, HLD, DM2, Afib on coumadin and ESRD on HD (Tu, Thurs, Sat at [facility]), hyperthyroidism, presented to ER for pain and swelling of left leg since 3 days. Patient reports sustaining injury to posterior aspect of left leg 1 month ago when he accidentally hit his leg against his bed. He reports the wound has not healed since. Reports he developed pain and swelling of left leg since 3 days, 9/10 in severity, with difficulty ambulating. Reports he was advised to get doppler which he reviewed with [facility] cardiologist today, was told he has no clot and recommended to come to ER. Denies fever, chills, weakness, cough, dysuria, NVDC. (18 Jun 2020 19:29)    - LEFT ARM SWELLING - F/U LEFT UPPER EXTREMITY ULTRASOUND  -  CELLULITIS OF LEFT LEG, DIABETIC LEFT FOOT ULCER  - IMPROVED ON VANCOMYCIN [PER ID RECOMMENDATION, SWITCHED FROM UNASYN+DOXY]. BLOOD  CXS NGTD. ID F/UP BY DR. DUEÑAS    - LOCULATED LEFT SIDED PLEURAL EFFUSION - PULMONOLOGY CONSULT PLACED. SURGERY CONSULT PLACED. CT-GUIDED DRAINAGE ATTEMPTED TODAY HOWEVER IT WAS UNSUCCESSFUL, HOWEVER UNDERWENT THORACENTESIS. F/U PLEURAL FLUID ANALYSIS. UNDERWENT IR GUIDED CHEST-TUBE PLACEMENT [CASE DISCUSSED WITH PATIENT AND SON; THEY AGREED TO THE PROCEDURE]  - ACUTE METABOLIC ENCEPHALOPATHY - WILL MINIMIZE DELIRIOGENIC AGENTS, FREQUENT RE-ORIENTATION  - PATIENT REPORTED VISION LOSS - ON EVALUATION VISION WAS INTACT  -  ESRD ON HD - NEPHROLOGY CONSULT IN PROGRESS  - DIASTOLIC CHF - TTE W/ EVIDENCE OF RIGHT/LEFT SYSTOLIC DYSFXN, PULMONARY HTN, MR, STRESS TEST WITHOUT NEW PERFUSION ABNORMALITIES; CARDIOLOGY CONSULT IN PROGRESS  - A/FIB ON COUMADIN - HOLD COUMADIN, PLACED ON HEPARIN DRIP  -  PAD, DIABETIC PERIPHERAL NEUROPATHY - PAIN MGMT CONSULT IN PROGRESS  - CASE DISCUSSED EXTENSIVELY WITH SON [SYLVAIN RENTERIA ] AND PATIENT. DISCUSSED RISKS/BENEFITS OF CHEST TUBE PLACEMENT WITH SON, WHO AGREED TO PROCEED WITH INTERVENTION.  -  GI AND DVT PROPHYLAXIS    DONALD DIAZ M.D. COVERING EPHRAIM DIAZ M.D.

## 2020-07-03 LAB
ANION GAP SERPL CALC-SCNC: 13 MMOL/L — SIGNIFICANT CHANGE UP (ref 5–17)
APTT BLD: 123.8 SEC — CRITICAL HIGH (ref 27.5–35.5)
APTT BLD: 31.5 SEC — SIGNIFICANT CHANGE UP (ref 27.5–35.5)
BUN SERPL-MCNC: 53 MG/DL — HIGH (ref 7–18)
CALCIUM SERPL-MCNC: 9 MG/DL — SIGNIFICANT CHANGE UP (ref 8.4–10.5)
CHLORIDE SERPL-SCNC: 92 MMOL/L — LOW (ref 96–108)
CO2 SERPL-SCNC: 28 MMOL/L — SIGNIFICANT CHANGE UP (ref 22–31)
CREAT SERPL-MCNC: 8.17 MG/DL — HIGH (ref 0.5–1.3)
GLUCOSE BLDC GLUCOMTR-MCNC: 198 MG/DL — HIGH (ref 70–99)
GLUCOSE BLDC GLUCOMTR-MCNC: 233 MG/DL — HIGH (ref 70–99)
GLUCOSE BLDC GLUCOMTR-MCNC: 89 MG/DL — SIGNIFICANT CHANGE UP (ref 70–99)
GLUCOSE BLDC GLUCOMTR-MCNC: 91 MG/DL — SIGNIFICANT CHANGE UP (ref 70–99)
GLUCOSE BLDC GLUCOMTR-MCNC: 94 MG/DL — SIGNIFICANT CHANGE UP (ref 70–99)
GLUCOSE SERPL-MCNC: 102 MG/DL — HIGH (ref 70–99)
GRAM STN FLD: SIGNIFICANT CHANGE UP
HCT VFR BLD CALC: 44.1 % — SIGNIFICANT CHANGE UP (ref 39–50)
HGB BLD-MCNC: 13.2 G/DL — SIGNIFICANT CHANGE UP (ref 13–17)
INR BLD: 1.46 RATIO — HIGH (ref 0.88–1.16)
MCHC RBC-ENTMCNC: 28.7 PG — SIGNIFICANT CHANGE UP (ref 27–34)
MCHC RBC-ENTMCNC: 29.9 GM/DL — LOW (ref 32–36)
MCV RBC AUTO: 95.9 FL — SIGNIFICANT CHANGE UP (ref 80–100)
NRBC # BLD: 0 /100 WBCS — SIGNIFICANT CHANGE UP (ref 0–0)
PLATELET # BLD AUTO: 178 K/UL — SIGNIFICANT CHANGE UP (ref 150–400)
POTASSIUM SERPL-MCNC: 4.7 MMOL/L — SIGNIFICANT CHANGE UP (ref 3.5–5.3)
POTASSIUM SERPL-SCNC: 4.7 MMOL/L — SIGNIFICANT CHANGE UP (ref 3.5–5.3)
PROTHROM AB SERPL-ACNC: 16.8 SEC — HIGH (ref 10.6–13.6)
RBC # BLD: 4.6 M/UL — SIGNIFICANT CHANGE UP (ref 4.2–5.8)
RBC # FLD: 16.2 % — HIGH (ref 10.3–14.5)
SODIUM SERPL-SCNC: 133 MMOL/L — LOW (ref 135–145)
SPECIMEN SOURCE: SIGNIFICANT CHANGE UP
WBC # BLD: 10.82 K/UL — HIGH (ref 3.8–10.5)
WBC # FLD AUTO: 10.82 K/UL — HIGH (ref 3.8–10.5)

## 2020-07-03 PROCEDURE — 71045 X-RAY EXAM CHEST 1 VIEW: CPT | Mod: 26

## 2020-07-03 RX ORDER — HEPARIN SODIUM 5000 [USP'U]/ML
INJECTION INTRAVENOUS; SUBCUTANEOUS
Qty: 25000 | Refills: 0 | Status: DISCONTINUED | OUTPATIENT
Start: 2020-07-03 | End: 2020-07-06

## 2020-07-03 RX ORDER — HEPARIN SODIUM 5000 [USP'U]/ML
3000 INJECTION INTRAVENOUS; SUBCUTANEOUS EVERY 6 HOURS
Refills: 0 | Status: DISCONTINUED | OUTPATIENT
Start: 2020-07-03 | End: 2020-07-06

## 2020-07-03 RX ORDER — HEPARIN SODIUM 5000 [USP'U]/ML
6500 INJECTION INTRAVENOUS; SUBCUTANEOUS EVERY 6 HOURS
Refills: 0 | Status: DISCONTINUED | OUTPATIENT
Start: 2020-07-03 | End: 2020-07-06

## 2020-07-03 RX ADMIN — Medication 667 MILLIGRAM(S): at 17:01

## 2020-07-03 RX ADMIN — LIDOCAINE 1 APPLICATION(S): 4 CREAM TOPICAL at 05:48

## 2020-07-03 RX ADMIN — KETOTIFEN FUMARATE 1 DROP(S): 0.34 SOLUTION OPHTHALMIC at 05:47

## 2020-07-03 RX ADMIN — LIDOCAINE 1 APPLICATION(S): 4 CREAM TOPICAL at 15:21

## 2020-07-03 RX ADMIN — KETOTIFEN FUMARATE 1 DROP(S): 0.34 SOLUTION OPHTHALMIC at 17:01

## 2020-07-03 RX ADMIN — LIDOCAINE 1 APPLICATION(S): 4 CREAM TOPICAL at 21:03

## 2020-07-03 RX ADMIN — SIMVASTATIN 40 MILLIGRAM(S): 20 TABLET, FILM COATED ORAL at 21:03

## 2020-07-03 RX ADMIN — SENNA PLUS 2 TABLET(S): 8.6 TABLET ORAL at 21:03

## 2020-07-03 RX ADMIN — PANTOPRAZOLE SODIUM 40 MILLIGRAM(S): 20 TABLET, DELAYED RELEASE ORAL at 05:48

## 2020-07-03 RX ADMIN — HEPARIN SODIUM 1300 UNIT(S)/HR: 5000 INJECTION INTRAVENOUS; SUBCUTANEOUS at 19:21

## 2020-07-03 RX ADMIN — HEPARIN SODIUM 1500 UNIT(S)/HR: 5000 INJECTION INTRAVENOUS; SUBCUTANEOUS at 12:47

## 2020-07-03 RX ADMIN — Medication 667 MILLIGRAM(S): at 11:34

## 2020-07-03 RX ADMIN — AMIODARONE HYDROCHLORIDE 200 MILLIGRAM(S): 400 TABLET ORAL at 05:47

## 2020-07-03 RX ADMIN — Medication 81 MILLIGRAM(S): at 11:27

## 2020-07-03 RX ADMIN — CHLORHEXIDINE GLUCONATE 1 APPLICATION(S): 213 SOLUTION TOPICAL at 11:27

## 2020-07-03 RX ADMIN — Medication 2: at 17:01

## 2020-07-03 NOTE — PROGRESS NOTE ADULT - ASSESSMENT
Patient is a 69y Male whom presented to the hospital with LT leg pain and swelling.  Was sent from cardiology clinic where he had gone for routine evaluation as was noted  to have RT leg swelling, to R/O DVT. ( doppler US negative for DVT).admitted for treatment of leg cellulitis   Renal consulted for ESRD/ s/p L pigtail catheter.     A/P  #ESRD HD yesterday uneventful, flowshet reveiwed. Repeat HD tomorrow.   # hyponatremia . In a patient with hypervolemia and lung pathology.  fluid restriction 1.2 litres/day.  RPT BMP in AM   # Anemia of CKD. stable off ROSARIO  # lytes and acid base at goal  on mx for bilateral leg cellulitis, Neg DVT.

## 2020-07-03 NOTE — PROGRESS NOTE ADULT - SUBJECTIVE AND OBJECTIVE BOX
Patient seen and examined at bedside  s/p IR Pigtail placement yesterday for L pleural effusion  Heparin drip on hold  pt without complaint this AM    ICU Vital Signs Last 24 Hrs  T(C): 36.3 (03 Jul 2020 07:44), Max: 36.6 (02 Jul 2020 16:00)  T(F): 97.4 (03 Jul 2020 07:44), Max: 97.9 (02 Jul 2020 16:00)  HR: 85 (03 Jul 2020 07:44) (78 - 101)  BP: 113/62 (03 Jul 2020 07:44) (105/60 - 149/51)  BP(mean): 92 (02 Jul 2020 15:30) (90 - 92)  ABP: --  ABP(mean): --  RR: 17 (03 Jul 2020 07:44) (0 - 22)  SpO2: 94% (03 Jul 2020 07:44) (92% - 100%)      GENERAL: Alert, NAD  CHEST/LUNG:  respirations nonlabored, L pigtail in place with serosanguinous output    I&O's Detail    02 Jul 2020 07:01  -  03 Jul 2020 07:00  --------------------------------------------------------  IN:    0.9% NaCl: 10 mL    Other: 400 mL  Total IN: 410 mL    OUT:    Chest Tube: 1725 mL    Other: 1500 mL  Total OUT: 3225 mL    Total NET: -2815 mL          LABS:                                   13.2   10.82 )-----------( 178      ( 03 Jul 2020 06:25 )             44.1     07-03    133<L>  |  92<L>  |  53<H>  ----------------------------<  102<H>  4.7   |  28  |  8.17<H>    Ca    9.0      03 Jul 2020 06:25    TPro  7.6  /  Alb  3.1<L>  /  TBili  0.6  /  DBili  x   /  AST  16  /  ALT  26  /  AlkPhos  79  07-02        RADIOLOGY & ADDITIONAL STUDIES:  < from: Xray Chest 1 View- PORTABLE-Routine (07.03.20 @ 09:29) >    EXAM:  XR CHEST PORTABLE ROUTINE 1V                            PROCEDURE DATE:  07/03/2020          INTERPRETATION:  CLINICAL INDICATION: 69 years  Male with s/p left pigtail placement.    COMPARISON: 6/30/2020    AP view of the chest demonstrates a pigtail catheter in the left hemithorax. The left pleural effusion is significantly smaller than previous. There is no pneumothorax.    There is mild pulmonary vascular congestion. There is no right pleural effusion. The heart is markedly enlarged. The mediastinum and ainsley cannot be assessed due to rotation. Sternotomy wires are reidentified.    A left subclavian and axillary vascular stent is reidentified.    IMPRESSION:    Left pigtail thoracostomy tube. Improvement in left pleural effusion. No pneumothorax.    Mild pulmonary vascular congestion. Cardiomegaly. Sternotomy.    < end of copied text >

## 2020-07-03 NOTE — PROGRESS NOTE ADULT - PROBLEM SELECTOR PLAN 9
Patient admitted from home and is expected to return home when medically stable for discharge   Pt may need home O2 when discharged  Case management and social work following Patient admitted from home and is expected to return home when medically stable for discharge   Pt may need home O2 when discharged  Will assess 02 on room air when chest tube removed  Case management and social work following

## 2020-07-03 NOTE — PROGRESS NOTE ADULT - SUBJECTIVE AND OBJECTIVE BOX
Nephrology Followup Note - [phone] - Dr Mendiola / Dr Knight / Dr Macias / Dr Arroyo / Dr Mackey / Dr Bowen / Dr Esposito / Dr Calle  Pt seen and examined at bedside  No acute events overnight. No complaints.     Allergies:  lisinopril (Other)  opioid-like analgesics (Other)    Hospital Medications:   MEDICATIONS  (STANDING):  acetaminophen  IVPB .. 1000 milliGRAM(s) IV Intermittent once  aMIOdarone    Tablet 200 milliGRAM(s) Oral daily  aspirin enteric coated 81 milliGRAM(s) Oral daily  calcium acetate 667 milliGRAM(s) Oral three times a day with meals  chlorhexidine 2% Cloths 1 Application(s) Topical daily  dextrose 5%. 1000 milliLiter(s) (50 mL/Hr) IV Continuous <Continuous>  glycopyrrolate 9 MICROgram(s)/formoterol 4.8 MICROgram(s) Inhaler 2 Puff(s) Inhalation two times a day  insulin lispro (HumaLOG) corrective regimen sliding scale   SubCutaneous three times a day before meals  insulin lispro (HumaLOG) corrective regimen sliding scale   SubCutaneous at bedtime  ketotifen 0.025% Ophthalmic Solution 1 Drop(s) Both EYES two times a day  lidocaine 1% Injectable 20 milliLiter(s) Local Injection once  lidocaine 2% Gel 1 Application(s) Topical every 8 hours  methimazole 5 milliGRAM(s) Oral daily  metoprolol tartrate 100 milliGRAM(s) Oral two times a day  pantoprazole    Tablet 40 milliGRAM(s) Oral before breakfast  senna 2 Tablet(s) Oral at bedtime  simvastatin 40 milliGRAM(s) Oral at bedtime  tiotropium 18 MICROgram(s) Capsule 1 Capsule(s) Inhalation daily    VITALS:  T(F): 97.4 (07-03-20 @ 07:44), Max: 97.9 (07-02-20 @ 16:00)  HR: 85 (07-03-20 @ 07:44)  BP: 113/62 (07-03-20 @ 07:44)  RR: 17 (07-03-20 @ 07:44)  SpO2: 94% (07-03-20 @ 07:44)  Wt(kg): --    07-02 @ 07:01  -  07-03 @ 07:00  --------------------------------------------------------  IN: 410 mL / OUT: 3225 mL / NET: -2815 mL        PHYSICAL EXAM:  Constitutional: NAD  HEENT: anicteric sclera, oropharynx clear, MMM  Neck: No JVD  Respiratory: CTAB, no wheezes, rales or rhonchi, L pigtail cath   Cardiovascular: S1, S2, RRR  Gastrointestinal: BS+, soft, NT/ND  Extremities: No cyanosis or clubbing. No peripheral edema  Neurological: A/O x 3, no focal deficits  Psychiatric: Normal mood, normal affect  : No CVA tenderness. No wagner.   Skin: No rashes  Vascular Access: LUE AV access +thrill and bruit. Hematoma over access     LABS:  07-03    133<L>  |  92<L>  |  53<H>  ----------------------------<  102<H>  4.7   |  28  |  8.17<H>    Ca    9.0      03 Jul 2020 06:25    TPro  7.6  /  Alb  3.1<L>  /  TBili  0.6  /  DBili      /  AST  16  /  ALT  26  /  AlkPhos  79  07-02    Creatinine Trend: 8.17 <--, 10.70 <--, 9.21 <--, 11.90 <--, 10.20 <--, 8.35 <--, 10.20 <--                        13.2   10.82 )-----------( 178      ( 03 Jul 2020 06:25 )             44.1     Urine Studies:      RADIOLOGY & ADDITIONAL STUDIES:

## 2020-07-03 NOTE — PROGRESS NOTE ADULT - ASSESSMENT
s/p IR L pigtail placement on 7/2 for L pleural effusion. Improved on CXR.     1. Daily CXR  2. Monitor output  3. IR f/u if output dramatically decreases or pigtail malfunction  4. Will follow

## 2020-07-03 NOTE — PROGRESS NOTE ADULT - SUBJECTIVE AND OBJECTIVE BOX
S: no CP or SOB Review of systems otherwise (-)    acetaminophen   Tablet .. 650 milliGRAM(s) Oral every 6 hours PRN  acetaminophen  IVPB .. 1000 milliGRAM(s) IV Intermittent once  ALBUTerol    90 MICROgram(s) HFA Inhaler 2 Puff(s) Inhalation every 6 hours PRN  aMIOdarone    Tablet 200 milliGRAM(s) Oral daily  aspirin enteric coated 81 milliGRAM(s) Oral daily  calcium acetate 667 milliGRAM(s) Oral three times a day with meals  chlorhexidine 2% Cloths 1 Application(s) Topical daily  dextrose 5%. 1000 milliLiter(s) IV Continuous <Continuous>  glycopyrrolate 9 MICROgram(s)/formoterol 4.8 MICROgram(s) Inhaler 2 Puff(s) Inhalation two times a day  heparin   Injectable 6500 Unit(s) IV Push every 6 hours PRN  heparin   Injectable 3000 Unit(s) IV Push every 6 hours PRN  heparin  Infusion.  Unit(s)/Hr IV Continuous <Continuous>  insulin lispro (HumaLOG) corrective regimen sliding scale   SubCutaneous three times a day before meals  insulin lispro (HumaLOG) corrective regimen sliding scale   SubCutaneous at bedtime  ketotifen 0.025% Ophthalmic Solution 1 Drop(s) Both EYES two times a day  lidocaine 1% Injectable 20 milliLiter(s) Local Injection once  lidocaine 2% Gel 1 Application(s) Topical every 8 hours  methimazole 5 milliGRAM(s) Oral daily  metoprolol tartrate 100 milliGRAM(s) Oral two times a day  pantoprazole    Tablet 40 milliGRAM(s) Oral before breakfast  senna 2 Tablet(s) Oral at bedtime  simvastatin 40 milliGRAM(s) Oral at bedtime  tiotropium 18 MICROgram(s) Capsule 1 Capsule(s) Inhalation daily                            13.2   10.82 )-----------( 178      ( 03 Jul 2020 06:25 )             44.1       07-03    133<L>  |  92<L>  |  53<H>  ----------------------------<  102<H>  4.7   |  28  |  8.17<H>    Ca    9.0      03 Jul 2020 06:25    TPro  7.6  /  Alb  3.1<L>  /  TBili  0.6  /  DBili  x   /  AST  16  /  ALT  26  /  AlkPhos  79  07-02            T(C): 37.1 (07-03-20 @ 15:43), Max: 37.1 (07-03-20 @ 15:43)  HR: 98 (07-03-20 @ 15:43) (78 - 100)  BP: 107/66 (07-03-20 @ 15:43) (105/60 - 149/51)  RR: 19 (07-03-20 @ 15:43) (17 - 22)  SpO2: 97% (07-03-20 @ 15:43) (94% - 100%)  Wt(kg): --    I&O's Summary    02 Jul 2020 07:01  -  03 Jul 2020 07:00  --------------------------------------------------------  IN: 410 mL / OUT: 3225 mL / NET: -2815 mL            Gen: Appears well in NAD  HEENT:  (-)icterus (-)pallor  CV: N S1 S2 1/6 DEJAH (+)2 Pulses B/l  Resp:  Clear to ausculatation B/L, decreased at left base normal effort  GI: (+) BS Soft, NT, ND  Lymph:  (+) minimal Edema, (-)obvious lymphadenopathy  Skin: Warm to touch, Normal turgor  Psych: Appropriate mood and affect      ASSESSMENT/PLAN: 69y Male  CAD s/p multiple stents, s/p CABG (9/2019 course c/b Afib and LT pleural effusion), Normal LV systolic function, severe Diastolic, RV dysfx CHF, HTN, HLD, DM2, Afib on coumadin and ESRD on HD (Tu, Thurs, Sat at [facility]), hyperthyroidism, presented to ER for pain and swelling of left leg since 3 days.    - LE dopplers negative for dVT   - s/p thoracentesis  - Resume AC when ok with primary team and thoracic surgery   - Goal INR 2-3    Kalie Lau MD

## 2020-07-03 NOTE — PROGRESS NOTE ADULT - SUBJECTIVE AND OBJECTIVE BOX
Time of Visit:  Patient seen and examined.     MEDICATIONS  (STANDING):  acetaminophen  IVPB .. 1000 milliGRAM(s) IV Intermittent once  aMIOdarone    Tablet 200 milliGRAM(s) Oral daily  aspirin enteric coated 81 milliGRAM(s) Oral daily  calcium acetate 667 milliGRAM(s) Oral three times a day with meals  chlorhexidine 2% Cloths 1 Application(s) Topical daily  dextrose 5%. 1000 milliLiter(s) (50 mL/Hr) IV Continuous <Continuous>  glycopyrrolate 9 MICROgram(s)/formoterol 4.8 MICROgram(s) Inhaler 2 Puff(s) Inhalation two times a day  heparin  Infusion.  Unit(s)/Hr (15 mL/Hr) IV Continuous <Continuous>  insulin lispro (HumaLOG) corrective regimen sliding scale   SubCutaneous three times a day before meals  insulin lispro (HumaLOG) corrective regimen sliding scale   SubCutaneous at bedtime  ketotifen 0.025% Ophthalmic Solution 1 Drop(s) Both EYES two times a day  lidocaine 1% Injectable 20 milliLiter(s) Local Injection once  lidocaine 2% Gel 1 Application(s) Topical every 8 hours  methimazole 5 milliGRAM(s) Oral daily  metoprolol tartrate 100 milliGRAM(s) Oral two times a day  pantoprazole    Tablet 40 milliGRAM(s) Oral before breakfast  senna 2 Tablet(s) Oral at bedtime  simvastatin 40 milliGRAM(s) Oral at bedtime  tiotropium 18 MICROgram(s) Capsule 1 Capsule(s) Inhalation daily      MEDICATIONS  (PRN):  acetaminophen   Tablet .. 650 milliGRAM(s) Oral every 6 hours PRN Moderate Pain (4 - 6)  ALBUTerol    90 MICROgram(s) HFA Inhaler 2 Puff(s) Inhalation every 6 hours PRN Shortness of Breath and/or Wheezing  heparin   Injectable 6500 Unit(s) IV Push every 6 hours PRN For aPTT less than 40  heparin   Injectable 3000 Unit(s) IV Push every 6 hours PRN For aPTT between 40 - 57       Medications up to date at time of exam.      PHYSICAL EXAMINATION:  Patient has no new complaints.  GENERAL: The patient is a well-developed, well-nourished, in no apparent distress.     Vital Signs Last 24 Hrs  T(C): 36.3 (03 Jul 2020 07:44), Max: 36.6 (02 Jul 2020 16:00)  T(F): 97.4 (03 Jul 2020 07:44), Max: 97.9 (02 Jul 2020 16:00)  HR: 85 (03 Jul 2020 07:44) (78 - 100)  BP: 113/62 (03 Jul 2020 07:44) (105/60 - 149/51)  BP(mean): --  RR: 17 (03 Jul 2020 07:44) (17 - 22)  SpO2: 94% (03 Jul 2020 07:44) (94% - 100%)   (if applicable)    Chest Tube (if applicable)    HEENT: Head is normocephalic and atraumatic. Extraocular muscles are intact. Mucous membranes are moist.     NECK: Supple, no palpable adenopathy.    LUNGS: Clear to auscultation, no wheezing, rales, or rhonchi. + left chest tube ( off suction)    HEART: Regular rate and rhythm without murmur.    ABDOMEN: Soft, nontender, and nondistended.  No hepatosplenomegaly is noted.    : No painful voiding, no pelvic pain    EXTREMITIES: Without any cyanosis, clubbing, rash, lesions or edema.    NEUROLOGIC: asleep    SKIN: Warm, dry, good turgor.      LABS:                        13.2   10.82 )-----------( 178      ( 03 Jul 2020 06:25 )             44.1     07-03    133<L>  |  92<L>  |  53<H>  ----------------------------<  102<H>  4.7   |  28  |  8.17<H>    Ca    9.0      03 Jul 2020 06:25    TPro  7.6  /  Alb  3.1<L>  /  TBili  0.6  /  DBili  x   /  AST  16  /  ALT  26  /  AlkPhos  79  07-02    PT/INR - ( 03 Jul 2020 06:25 )   PT: 16.8 sec;   INR: 1.46 ratio         PTT - ( 03 Jul 2020 06:25 )  PTT:31.5 sec                    MICROBIOLOGY: (if applicable)    RADIOLOGY & ADDITIONAL STUDIES:  EKG:   CXR:< from: Xray Chest 1 View- PORTABLE-Routine (07.03.20 @ 09:29) >    EXAM:  XR CHEST PORTABLE ROUTINE 1V                            PROCEDURE DATE:  07/03/2020          INTERPRETATION:  CLINICAL INDICATION: 69 years  Male with s/p left pigtail placement.    COMPARISON: 6/30/2020    AP view of the chest demonstrates a pigtail catheter in the left hemithorax. The left pleural effusion is significantly smaller than previous. There is no pneumothorax.    There is mild pulmonary vascular congestion. There is no right pleural effusion. The heart is markedly enlarged. The mediastinum and ainsley cannot be assessed due to rotation. Sternotomy wires are reidentified.    A left subclavian and axillary vascular stent is reidentified.    IMPRESSION:    Left pigtail thoracostomy tube. Improvement in left pleural effusion. No pneumothorax.    Mild pulmonary vascular congestion. Cardiomegaly. Sternotomy.                  HUSSAIN LINDSEY M.D., ATTENDING RADIOLOGIST  This document has been electronically signed. Jul  3 2020 10:08AM                < end of copied text >    ECHO:    IMPRESSION: 69y Male PAST MEDICAL & SURGICAL HISTORY:  ESRD (end stage renal disease) on dialysis: T-Th-Sat  Stented coronary artery: total 15 stents from [id number]  Hyperthyroidism  H/O: CVA: after cardiac stent placed 12/15/09 -no residual  Heart Attack: 2/1/07 with subsequent stent  Coronary Stent: [id number] 10 stents,  to Ramus 1/2015, cath 01/2016 ( see results in HPI)  Hypercholesterolemia  CAD (Coronary Artery Disease)  DM (Diabetes Mellitus): x 4 yrs without N/N/R  HTN (Hypertension)  S/P CABG x 4  Hemodialysis access, AV graft: 5/2015, L arm  S/P cataract extraction  Boil of Buttock: 2006 and 2008   p/w           IMP: This is a  69 man  with CAD s/p multiple stents, s/p CABG (9/2019 course c/b Afib and LT pleural effusion), Diastolic CHF, HTN, HLD, DM2, Afib on coumadin and ESRD on HD (Tu, Thurs, Sat at [facility]), hyperthyroidism, presented to ER for pain and swelling of left leg since 3 days. Admitted to medicine for LLE cellulitis.  CXR shows gross heart enlargement and mild to moderate left base pleural pulmonary reaction again noted. Sternotomy again seen. Negative for DVT on Doppler study. Blood Cx NGTD. Pt was started on Unasyn and po Doxy, but changed to Vancomycin due to non healing cellulitis. ID consulted.  Improved leg swelling and erythema with Vanco.   Pt. with ESRD on HD, followed by nephro Dr. Calle, HD tolerated. Pt. with significant cardiac hx including AF on Coumadin, followed by cardiology. Coumadin dose adjusted for goal INR 2-3. ECHO resulted EF 40-45% (previous EF 55% in Jan. 2020.  CT chest reveal enlarging left loculated pleural effusion. Pat is not in any resp distress and O2 sat 100 % 2 LPM via NC. He will require chest tube placement but INR is elevated due to coumadin. Thoracic surgery unable to place chest tube, s/p thoracentesis 6/30 .. 150 ml . Post procedure cxr neg for ptx.  IR placed Left chest tube 7/2 out put 1700 ml /24 hr        Sugg;  -resume therapeutic anticoag with hep and coumadin  -f/u pleural fluid cytology  -dialysis as per neph  -continue O2 via NC as needed  -cxr in AM  -monitor chest tube out put

## 2020-07-03 NOTE — PROGRESS NOTE ADULT - SUBJECTIVE AND OBJECTIVE BOX
NP Note discussed with  primary attending    Patient is a 69y old  Male who presents with a chief complaint of leg pain (03 Jul 2020 11:42)    *** incomplete ***   INTERVAL HPI/OVERNIGHT EVENTS: no new complaints    MEDICATIONS  (STANDING):  acetaminophen  IVPB .. 1000 milliGRAM(s) IV Intermittent once  aMIOdarone    Tablet 200 milliGRAM(s) Oral daily  aspirin enteric coated 81 milliGRAM(s) Oral daily  calcium acetate 667 milliGRAM(s) Oral three times a day with meals  chlorhexidine 2% Cloths 1 Application(s) Topical daily  dextrose 5%. 1000 milliLiter(s) (50 mL/Hr) IV Continuous <Continuous>  glycopyrrolate 9 MICROgram(s)/formoterol 4.8 MICROgram(s) Inhaler 2 Puff(s) Inhalation two times a day  heparin  Infusion.  Unit(s)/Hr (15 mL/Hr) IV Continuous <Continuous>  insulin lispro (HumaLOG) corrective regimen sliding scale   SubCutaneous three times a day before meals  insulin lispro (HumaLOG) corrective regimen sliding scale   SubCutaneous at bedtime  ketotifen 0.025% Ophthalmic Solution 1 Drop(s) Both EYES two times a day  lidocaine 1% Injectable 20 milliLiter(s) Local Injection once  lidocaine 2% Gel 1 Application(s) Topical every 8 hours  methimazole 5 milliGRAM(s) Oral daily  metoprolol tartrate 100 milliGRAM(s) Oral two times a day  pantoprazole    Tablet 40 milliGRAM(s) Oral before breakfast  senna 2 Tablet(s) Oral at bedtime  simvastatin 40 milliGRAM(s) Oral at bedtime  tiotropium 18 MICROgram(s) Capsule 1 Capsule(s) Inhalation daily    MEDICATIONS  (PRN):  acetaminophen   Tablet .. 650 milliGRAM(s) Oral every 6 hours PRN Moderate Pain (4 - 6)  ALBUTerol    90 MICROgram(s) HFA Inhaler 2 Puff(s) Inhalation every 6 hours PRN Shortness of Breath and/or Wheezing  heparin   Injectable 6500 Unit(s) IV Push every 6 hours PRN For aPTT less than 40  heparin   Injectable 3000 Unit(s) IV Push every 6 hours PRN For aPTT between 40 - 57      __________________________________________________  REVIEW OF SYSTEMS:    CONSTITUTIONAL: No fever,   EYES: no acute visual disturbances  NECK: No pain or stiffness  RESPIRATORY: No cough; No shortness of breath  CARDIOVASCULAR: No chest pain, no palpitations  GASTROINTESTINAL: No pain. No nausea or vomiting; No diarrhea   NEUROLOGICAL: No headache or numbness, no tremors  MUSCULOSKELETAL: No joint pain, no muscle pain  GENITOURINARY: no dysuria, no frequency, no hesitancy  PSYCHIATRY: no depression , no anxiety  ALL OTHER  ROS negative        Vital Signs Last 24 Hrs  T(C): 36.3 (03 Jul 2020 07:44), Max: 36.6 (02 Jul 2020 16:00)  T(F): 97.4 (03 Jul 2020 07:44), Max: 97.9 (02 Jul 2020 16:00)  HR: 85 (03 Jul 2020 07:44) (78 - 101)  BP: 113/62 (03 Jul 2020 07:44) (105/60 - 149/51)  BP(mean): 92 (02 Jul 2020 15:30) (90 - 92)  RR: 17 (03 Jul 2020 07:44) (0 - 22)  SpO2: 94% (03 Jul 2020 07:44) (92% - 100%)    ________________________________________________  PHYSICAL EXAM:  GENERAL: NAD  HEENT: Normocephalic;  conjunctivae and sclerae clear; moist mucous membranes;   NECK : supple  CHEST/LUNG: Clear to ausculitation bilaterally with good air entry   HEART: S1 S2  regular; no murmurs, gallops or rubs  ABDOMEN: Soft, Nontender, Nondistended; Bowel sounds present  EXTREMITIES: no cyanosis; no edema; no calf tenderness  SKIN: warm and dry; no rash  NERVOUS SYSTEM:  Awake and alert; Oriented  to place, person and time ; no new deficits    _________________________________________________  LABS:                        13.2   10.82 )-----------( 178      ( 03 Jul 2020 06:25 )             44.1     07-03    133<L>  |  92<L>  |  53<H>  ----------------------------<  102<H>  4.7   |  28  |  8.17<H>    Ca    9.0      03 Jul 2020 06:25    TPro  7.6  /  Alb  3.1<L>  /  TBili  0.6  /  DBili  x   /  AST  16  /  ALT  26  /  AlkPhos  79  07-02    PT/INR - ( 03 Jul 2020 06:25 )   PT: 16.8 sec;   INR: 1.46 ratio         PTT - ( 03 Jul 2020 06:25 )  PTT:31.5 sec    CAPILLARY BLOOD GLUCOSE      POCT Blood Glucose.: 91 mg/dL (03 Jul 2020 11:33)  POCT Blood Glucose.: 89 mg/dL (03 Jul 2020 07:57)  POCT Blood Glucose.: 94 mg/dL (03 Jul 2020 03:52)  POCT Blood Glucose.: 91 mg/dL (02 Jul 2020 20:13)  POCT Blood Glucose.: 113 mg/dL (02 Jul 2020 19:05)  POCT Blood Glucose.: 171 mg/dL (02 Jul 2020 15:03)        RADIOLOGY & ADDITIONAL TESTS:    Imaging Personally Reviewed:  YES/NO    Consultant(s) Notes Reviewed:   YES/ No    Care Discussed with Consultants :     Plan of care was discussed with patient and /or primary care giver; all questions and concerns were addressed and care was aligned with patient's wishes. NP Note discussed with  primary attending    69M with CAD s/p multiple stents, s/p CABG (9/2019 course c/b Afib and LT pleural effusion), Diastolic CHF, HTN, HLD, DM2, Afib on coumadin and ESRD on HD (Tu, Thurs, Sat at [facility]), hyperthyroidism, presented to ER for pain and swelling of left leg since 3 days. Admitted to medicine for LLE cellulitis. CT found large L loculated pleural effusion, recommending chest tube. Bedside thoracentesis drained 150cc of blood tinged pleural fluid. Specimen sent for pathology and culture. Attempt to place left pig tail catheter by Dr. Torres unsuccessful. Chest tube placed 7/2 1000mL of serosanguinous fluid drained since insertion. Repeat chest humza shows improvement of pleural effusion-  Heparin drip resumed.   Patient seen and examined at bedside, baseline mental status. Hemodynamically stable, plan for dialysis tomorrow      INTERVAL HPI/OVERNIGHT EVENTS: no new complaints    MEDICATIONS  (STANDING):  acetaminophen  IVPB .. 1000 milliGRAM(s) IV Intermittent once  aMIOdarone    Tablet 200 milliGRAM(s) Oral daily  aspirin enteric coated 81 milliGRAM(s) Oral daily  calcium acetate 667 milliGRAM(s) Oral three times a day with meals  chlorhexidine 2% Cloths 1 Application(s) Topical daily  dextrose 5%. 1000 milliLiter(s) (50 mL/Hr) IV Continuous <Continuous>  glycopyrrolate 9 MICROgram(s)/formoterol 4.8 MICROgram(s) Inhaler 2 Puff(s) Inhalation two times a day  heparin  Infusion.  Unit(s)/Hr (15 mL/Hr) IV Continuous <Continuous>  insulin lispro (HumaLOG) corrective regimen sliding scale   SubCutaneous three times a day before meals  insulin lispro (HumaLOG) corrective regimen sliding scale   SubCutaneous at bedtime  ketotifen 0.025% Ophthalmic Solution 1 Drop(s) Both EYES two times a day  lidocaine 1% Injectable 20 milliLiter(s) Local Injection once  lidocaine 2% Gel 1 Application(s) Topical every 8 hours  methimazole 5 milliGRAM(s) Oral daily  metoprolol tartrate 100 milliGRAM(s) Oral two times a day  pantoprazole    Tablet 40 milliGRAM(s) Oral before breakfast  senna 2 Tablet(s) Oral at bedtime  simvastatin 40 milliGRAM(s) Oral at bedtime  tiotropium 18 MICROgram(s) Capsule 1 Capsule(s) Inhalation daily    MEDICATIONS  (PRN):  acetaminophen   Tablet .. 650 milliGRAM(s) Oral every 6 hours PRN Moderate Pain (4 - 6)  ALBUTerol    90 MICROgram(s) HFA Inhaler 2 Puff(s) Inhalation every 6 hours PRN Shortness of Breath and/or Wheezing  heparin   Injectable 6500 Unit(s) IV Push every 6 hours PRN For aPTT less than 40  heparin   Injectable 3000 Unit(s) IV Push every 6 hours PRN For aPTT between 40 - 57  __________________________________________________  REVIEW OF SYSTEMS: unable to participate due to mental status     Vital Signs Last 24 Hrs  T(C): 36.3 (03 Jul 2020 07:44), Max: 36.6 (02 Jul 2020 16:00)  T(F): 97.4 (03 Jul 2020 07:44), Max: 97.9 (02 Jul 2020 16:00)  HR: 85 (03 Jul 2020 07:44) (78 - 101)  BP: 113/62 (03 Jul 2020 07:44) (105/60 - 149/51)  BP(mean): 92 (02 Jul 2020 15:30) (90 - 92)  RR: 17 (03 Jul 2020 07:44) (0 - 22)  SpO2: 94% (03 Jul 2020 07:44) (92% - 100%)    ________________________________________________  PHYSICAL EXAM:  GENERAL: Chronically ill appearing somnlent   HEENT: Normocephalic;  NECK : supple  CHEST/LUNG: + LLL decreased lung sounds,   HEART: S1 S2  regular; no murmurs, gallops or rubs  ABDOMEN: Soft, Nontender, Nondistended; Bowel sounds present  EXTREMITIES: no cyanosis; no edema; no calf tenderness  SKIN: warm and dry; no rash  NERVOUS SYSTEM:  Somnolent, arouses to tactile stimuli, disoriented at baseline  _________________________________________________  LABS:                        13.2   10.82 )-----------( 178      ( 03 Jul 2020 06:25 )             44.1     07-03    133<L>  |  92<L>  |  53<H>  ----------------------------<  102<H>  4.7   |  28  |  8.17<H>    Ca    9.0      03 Jul 2020 06:25    TPro  7.6  /  Alb  3.1<L>  /  TBili  0.6  /  DBili  x   /  AST  16  /  ALT  26  /  AlkPhos  79  07-02    PT/INR - ( 03 Jul 2020 06:25 )   PT: 16.8 sec;   INR: 1.46 ratio         PTT - ( 03 Jul 2020 06:25 )  PTT:31.5 sec    CAPILLARY BLOOD GLUCOSE      POCT Blood Glucose.: 91 mg/dL (03 Jul 2020 11:33)  POCT Blood Glucose.: 89 mg/dL (03 Jul 2020 07:57)  POCT Blood Glucose.: 94 mg/dL (03 Jul 2020 03:52)  POCT Blood Glucose.: 91 mg/dL (02 Jul 2020 20:13)  POCT Blood Glucose.: 113 mg/dL (02 Jul 2020 19:05)  POCT Blood Glucose.: 171 mg/dL (02 Jul 2020 15:03)        RADIOLOGY & ADDITIONAL TESTS:    Imaging Personally Reviewed:  YES    Consultant(s) Notes Reviewed:   YES    Care Discussed with Consultants : CT Sx    Plan of care was discussed with patient and /or primary care giver; all questions and concerns were addressed and care was aligned with patient's wishes.

## 2020-07-03 NOTE — PROGRESS NOTE ADULT - PROBLEM SELECTOR PLAN 3
CT noted above  Pigtail to be placed by IR 7/2  Pt satting well on supplemental O2  CXR shows improvement  Dr. Delaney (Pulmonary) following  CT surgery following

## 2020-07-03 NOTE — PROGRESS NOTE ADULT - ATTENDING COMMENTS
Rudy Mendiola MD  [facility]  Office [phone]  Ans Serv [phone] cell - [phone]

## 2020-07-03 NOTE — PROGRESS NOTE ADULT - PROBLEM SELECTOR PLAN 1
Improving  Afebrile  WBC 10.8  Completed Vanco  Dopplers negative   Pain management following, avoid opiates per family   Dr. Rayo (ID) following

## 2020-07-04 LAB
ANION GAP SERPL CALC-SCNC: 14 MMOL/L — SIGNIFICANT CHANGE UP (ref 5–17)
APTT BLD: 61.9 SEC — HIGH (ref 27.5–35.5)
APTT BLD: 87.4 SEC — HIGH (ref 27.5–35.5)
BUN SERPL-MCNC: 72 MG/DL — HIGH (ref 7–18)
CALCIUM SERPL-MCNC: 8.5 MG/DL — SIGNIFICANT CHANGE UP (ref 8.4–10.5)
CHLORIDE SERPL-SCNC: 93 MMOL/L — LOW (ref 96–108)
CO2 SERPL-SCNC: 24 MMOL/L — SIGNIFICANT CHANGE UP (ref 22–31)
CREAT SERPL-MCNC: 9.27 MG/DL — HIGH (ref 0.5–1.3)
GLUCOSE BLDC GLUCOMTR-MCNC: 114 MG/DL — HIGH (ref 70–99)
GLUCOSE BLDC GLUCOMTR-MCNC: 139 MG/DL — HIGH (ref 70–99)
GLUCOSE BLDC GLUCOMTR-MCNC: 145 MG/DL — HIGH (ref 70–99)
GLUCOSE BLDC GLUCOMTR-MCNC: 210 MG/DL — HIGH (ref 70–99)
GLUCOSE SERPL-MCNC: 146 MG/DL — HIGH (ref 70–99)
HCT VFR BLD CALC: 39.7 % — SIGNIFICANT CHANGE UP (ref 39–50)
HGB BLD-MCNC: 12.2 G/DL — LOW (ref 13–17)
MCHC RBC-ENTMCNC: 29.1 PG — SIGNIFICANT CHANGE UP (ref 27–34)
MCHC RBC-ENTMCNC: 30.7 GM/DL — LOW (ref 32–36)
MCV RBC AUTO: 94.7 FL — SIGNIFICANT CHANGE UP (ref 80–100)
NRBC # BLD: 0 /100 WBCS — SIGNIFICANT CHANGE UP (ref 0–0)
PLATELET # BLD AUTO: 172 K/UL — SIGNIFICANT CHANGE UP (ref 150–400)
POTASSIUM SERPL-MCNC: 4.6 MMOL/L — SIGNIFICANT CHANGE UP (ref 3.5–5.3)
POTASSIUM SERPL-SCNC: 4.6 MMOL/L — SIGNIFICANT CHANGE UP (ref 3.5–5.3)
RBC # BLD: 4.19 M/UL — LOW (ref 4.2–5.8)
RBC # FLD: 16.1 % — HIGH (ref 10.3–14.5)
SODIUM SERPL-SCNC: 131 MMOL/L — LOW (ref 135–145)
WBC # BLD: 8.98 K/UL — SIGNIFICANT CHANGE UP (ref 3.8–10.5)
WBC # FLD AUTO: 8.98 K/UL — SIGNIFICANT CHANGE UP (ref 3.8–10.5)

## 2020-07-04 PROCEDURE — 71045 X-RAY EXAM CHEST 1 VIEW: CPT | Mod: 26

## 2020-07-04 RX ORDER — QUETIAPINE FUMARATE 200 MG/1
12.5 TABLET, FILM COATED ORAL ONCE
Refills: 0 | Status: COMPLETED | OUTPATIENT
Start: 2020-07-04 | End: 2020-07-04

## 2020-07-04 RX ORDER — LANOLIN ALCOHOL/MO/W.PET/CERES
5 CREAM (GRAM) TOPICAL AT BEDTIME
Refills: 0 | Status: DISCONTINUED | OUTPATIENT
Start: 2020-07-04 | End: 2020-07-05

## 2020-07-04 RX ADMIN — Medication 2: at 17:20

## 2020-07-04 RX ADMIN — Medication 667 MILLIGRAM(S): at 12:35

## 2020-07-04 RX ADMIN — CHLORHEXIDINE GLUCONATE 1 APPLICATION(S): 213 SOLUTION TOPICAL at 12:35

## 2020-07-04 RX ADMIN — SENNA PLUS 2 TABLET(S): 8.6 TABLET ORAL at 21:18

## 2020-07-04 RX ADMIN — PANTOPRAZOLE SODIUM 40 MILLIGRAM(S): 20 TABLET, DELAYED RELEASE ORAL at 05:11

## 2020-07-04 RX ADMIN — QUETIAPINE FUMARATE 12.5 MILLIGRAM(S): 200 TABLET, FILM COATED ORAL at 21:18

## 2020-07-04 RX ADMIN — HEPARIN SODIUM 1300 UNIT(S)/HR: 5000 INJECTION INTRAVENOUS; SUBCUTANEOUS at 07:53

## 2020-07-04 RX ADMIN — Medication 667 MILLIGRAM(S): at 07:56

## 2020-07-04 RX ADMIN — LIDOCAINE 1 APPLICATION(S): 4 CREAM TOPICAL at 21:18

## 2020-07-04 RX ADMIN — KETOTIFEN FUMARATE 1 DROP(S): 0.34 SOLUTION OPHTHALMIC at 17:21

## 2020-07-04 RX ADMIN — LIDOCAINE 1 APPLICATION(S): 4 CREAM TOPICAL at 05:12

## 2020-07-04 RX ADMIN — SIMVASTATIN 40 MILLIGRAM(S): 20 TABLET, FILM COATED ORAL at 21:18

## 2020-07-04 RX ADMIN — AMIODARONE HYDROCHLORIDE 200 MILLIGRAM(S): 400 TABLET ORAL at 05:11

## 2020-07-04 RX ADMIN — Medication 667 MILLIGRAM(S): at 17:21

## 2020-07-04 RX ADMIN — HEPARIN SODIUM 1300 UNIT(S)/HR: 5000 INJECTION INTRAVENOUS; SUBCUTANEOUS at 01:26

## 2020-07-04 RX ADMIN — Medication 5 MILLIGRAM(S): at 23:08

## 2020-07-04 RX ADMIN — LIDOCAINE 1 APPLICATION(S): 4 CREAM TOPICAL at 13:01

## 2020-07-04 RX ADMIN — KETOTIFEN FUMARATE 1 DROP(S): 0.34 SOLUTION OPHTHALMIC at 05:12

## 2020-07-04 RX ADMIN — TIOTROPIUM BROMIDE 1 CAPSULE(S): 18 CAPSULE ORAL; RESPIRATORY (INHALATION) at 12:35

## 2020-07-04 RX ADMIN — Medication 81 MILLIGRAM(S): at 12:35

## 2020-07-04 RX ADMIN — Medication 100 MILLIGRAM(S): at 17:21

## 2020-07-04 NOTE — PROGRESS NOTE ADULT - SUBJECTIVE AND OBJECTIVE BOX
Patient is a 69y old  Male who presents with a chief complaint of leg pain (2020 16:54)    PATIENT IS SEEN AND EXAMINED IN MEDICAL FLOOR.    ALLERGIES:  lisinopril (Other)  opioid-like analgesics (Other)      Daily Weight in k.8 (2020 12:08)    VITALS:    Vital Signs Last 24 Hrs  T(C): 36.6 (2020 15:56), Max: 37 (2020 23:20)  T(F): 97.9 (2020 15:56), Max: 98.6 (2020 23:20)  HR: 82 (2020 15:56) (82 - 104)  BP: 110/68 (2020 15:56) (98/64 - 124/72)  BP(mean): --  RR: 18 (2020 15:56) (18 - 18)  SpO2: 100% (2020 15:56) (98% - 100%)    LABS:    CBC Full  -  ( 2020 05:25 )  WBC Count : 8.98 K/uL  RBC Count : 4.19 M/uL  Hemoglobin : 12.2 g/dL  Hematocrit : 39.7 %  Platelet Count - Automated : 172 K/uL  Mean Cell Volume : 94.7 fl  Mean Cell Hemoglobin : 29.1 pg  Mean Cell Hemoglobin Concentration : 30.7 gm/dL  Auto Neutrophil # : x  Auto Lymphocyte # : x  Auto Monocyte # : x  Auto Eosinophil # : x  Auto Basophil # : x  Auto Neutrophil % : x  Auto Lymphocyte % : x  Auto Monocyte % : x  Auto Eosinophil % : x  Auto Basophil % : x    PT/INR - ( 2020 06:25 )   PT: 16.8 sec;   INR: 1.46 ratio         PTT - ( 2020 07:03 )  PTT:61.9 sec  -    131<L>  |  93<L>  |  72<H>  ----------------------------<  146<H>  4.6   |  24  |  9.27<H>    Ca    8.5      2020 05:25      CAPILLARY BLOOD GLUCOSE      POCT Blood Glucose.: 210 mg/dL (2020 16:44)  POCT Blood Glucose.: 114 mg/dL (2020 11:41)  POCT Blood Glucose.: 139 mg/dL (2020 08:15)  POCT Blood Glucose.: 198 mg/dL (2020 21:29)          Creatinine Trend: 9.27<--, 8.17<--, 10.70<--, 9.21<--, 11.90<--, 10.20<--  I&O's Summary    2020 07:01  -  2020 18:58  --------------------------------------------------------  IN: 0 mL / OUT: 2450 mL / NET: -2450 mL            .Body Fluid pleural left   @ 23:29   No growth  --    No polymorphonuclear leukocytes seen  No organisms seen  by cytocentrifuge      .Body Fluid Pleural Fluid   @ 22:58   Culture is being performed.  --  --      .Blood Blood   @ 22:15   No Growth Final  --  --          MEDICATIONS:    MEDICATIONS  (STANDING):  acetaminophen  IVPB .. 1000 milliGRAM(s) IV Intermittent once  aMIOdarone    Tablet 200 milliGRAM(s) Oral daily  aspirin enteric coated 81 milliGRAM(s) Oral daily  calcium acetate 667 milliGRAM(s) Oral three times a day with meals  chlorhexidine 2% Cloths 1 Application(s) Topical daily  dextrose 5%. 1000 milliLiter(s) (50 mL/Hr) IV Continuous <Continuous>  glycopyrrolate 9 MICROgram(s)/formoterol 4.8 MICROgram(s) Inhaler 2 Puff(s) Inhalation two times a day  heparin  Infusion.  Unit(s)/Hr (15 mL/Hr) IV Continuous <Continuous>  insulin lispro (HumaLOG) corrective regimen sliding scale   SubCutaneous three times a day before meals  insulin lispro (HumaLOG) corrective regimen sliding scale   SubCutaneous at bedtime  ketotifen 0.025% Ophthalmic Solution 1 Drop(s) Both EYES two times a day  lidocaine 1% Injectable 20 milliLiter(s) Local Injection once  lidocaine 2% Gel 1 Application(s) Topical every 8 hours  methimazole 5 milliGRAM(s) Oral daily  metoprolol tartrate 100 milliGRAM(s) Oral two times a day  pantoprazole    Tablet 40 milliGRAM(s) Oral before breakfast  senna 2 Tablet(s) Oral at bedtime  simvastatin 40 milliGRAM(s) Oral at bedtime  tiotropium 18 MICROgram(s) Capsule 1 Capsule(s) Inhalation daily      MEDICATIONS  (PRN):  acetaminophen   Tablet .. 650 milliGRAM(s) Oral every 6 hours PRN Moderate Pain (4 - 6)  ALBUTerol    90 MICROgram(s) HFA Inhaler 2 Puff(s) Inhalation every 6 hours PRN Shortness of Breath and/or Wheezing  heparin   Injectable 6500 Unit(s) IV Push every 6 hours PRN For aPTT less than 40  heparin   Injectable 3000 Unit(s) IV Push every 6 hours PRN For aPTT between 40 - 57        REVIEW OF SYSTEMS:                           ALL ROS DONE [ X   ]      CONSTITUTIONAL:  LETHARGIC [   ], FEVER [   ], UNRESPONSIVE [   ]  CVS:  CP  [   ], SOB, [   ], PALPITATIONS [   ], DIZZYNESS [   ]  RS: COUGH [   ], SPUTUM [   ]  GI: ABDOMINAL PAIN [   ], NAUSEA [   ], VOMITINGS [   ], DIARRHEA [   ], CONSTIPATION [   ]  :  DYSURIA [   ], NOCTURIA [   ], INCREASED FREQUENCY [   ], DRIBLING [   ],  SKELETAL: PAINFUL JOINTS [   ], SWOLLEN JOINTS [   ], NECK ACHE [   ], LOW BACK ACHE [   ],  SKIN : ULCERS [   ], RASH [   ], ITCHING [   ]  CNS: HEAD ACHE [   ], DOUBLE VISION [   ], BLURRED VISION [   ], AMS / CONFUSION [   ], SEIZURES [   ], WEAKNESS [   ],TINGLING / NUMBNESS [   ]        PHYSICAL EXAMINATION:    GENERAL APPEARANCE: NO DISTRESS  HEENT:  NO PALLOR, NO  JVD,  NO   NODES, NECK SUPPLE  CVS: S1 +, S2 +,   RS: AEEB,  OCCASIONAL  RALES +,   NO RONCHI  ABD: SOFT, NT, NO, BS +  EXT: PE +, ERYTHEMA WITH INDURATION OF LEFT LEG +  SKIN: WARM,   SKELETAL:  ROM ACCEPTABLE  CNS:  AAO X  3  , NO  DEFICITS      RADIOLOGY :    < from: US Duplex Venous Lower Ext Complete, Bilateral (20 @ 11:32) >  IMPRESSION:   No evidence of deep venous thrombosis in either lower extremity.    < end of copied text >    < from: Xray Chest 1 View-PORTABLE IMMEDIATE (20 @ 17:48) >  IMPRESSION: Unchanged chest as above.    < end of copied text >          ASSESSMENT :     Cellulitis  ESRD (end stage renal disease) on dialysis  Stented coronary artery  Hyperthyroidism  CKD (chronic kidney disease)  H/O: CVA  Heart Attack  Coronary Stent  Hypercholesterolemia  CAD (Coronary Artery Disease)  DM (Diabetes Mellitus)  HTN (Hypertension)  S/P CABG x 4  Hemodialysis access, AV graft  S/P cataract extraction  Boil of Buttock      PLAN:  HPI:  69M with CAD s/p multiple stents, s/p CABG (2019 course c/b Afib and LT pleural effusion), Diastolic CHF, HTN, HLD, DM2, Afib on coumadin and ESRD on HD (, Thurs, Sat at [facility]), hyperthyroidism, presented to ER for pain and swelling of left leg since 3 days. Patient reports sustaining injury to posterior aspect of left leg 1 month ago when he accidentally hit his leg against his bed. He reports the wound has not healed since. Reports he developed pain and swelling of left leg since 3 days, 9/10 in severity, with difficulty ambulating. Reports he was advised to get doppler which he reviewed with [facility] cardiologist today, was told he has no clot and recommended to come to ER. Denies fever, chills, weakness, cough, dysuria, NVDC. (2020 19:29)    -  CELLULITIS OF LEFT LEG, DIABETIC LEFT FOOT ULCER  - IMPROVED ON VANCOMYCIN [PER ID RECOMMENDATION,  BLOOD  CXS NGTD. ID F/UP BY DR. DUEÑAS    - LOCULATED LEFT SIDED PLEURAL EFFUSION S/P IR PLACED PIG TAIL CATHETER ON  2020 - PULMONOLOGY CONSULT & THORACIC SURGERY CONSULT IN PROGRESS. CASE DISCUSSED WITH PATIENT AND SON.   - ACUTE METABOLIC ENCEPHALOPATHY - AVOID DELIRIOGENIC AGENTS, FREQUENT RE-ORIENTATION  - LEFT ARM SWELLING -  ULTRASOUND NEGATIVE FOR DVT  -  ESRD ON HD - NEPHROLOGY CONSULT IN PROGRESS  - DIASTOLIC CHF - TTE W/ EVIDENCE OF RIGHT/LEFT VENTRICULAR SYSTOLIC DYSFUNCTION, PULMONARY HTN, MR, STRESS TEST-  WITHOUT NEW PERFUSION ABNORMALITIES; CARDIOLOGY CONSULT IN PROGRESS  - A/FIB ON COUMADIN - HOLD COUMADIN, PLACED ON HEPARIN DRIP; RESUME COUMADIN AND F/UP INR TO 2-3  -  PAD, DIABETIC PERIPHERAL NEUROPATHY - PAIN MGMT CONSULT IN PROGRESS  - CASE DISCUSSED EXTENSIVELY WITH SON [SYLVAIN RENTERIA ] AND PATIENT. DISCUSSED RISKS/BENEFITS OF CHEST TUBE PLACEMENT WITH SON, WHO AGREED TO PROCEED WITH INTERVENTION.  -  ESRD ON HD  -  PAD, DIABETIC PERIPHERAL NEUROPATHY  -  GI AND DVT PROPHYLAXIS  -  DR. ASHA DIAZ

## 2020-07-04 NOTE — PROGRESS NOTE ADULT - SUBJECTIVE AND OBJECTIVE BOX
S: no CP or SOB;  Review of systems otherwise (-)      acetaminophen   Tablet .. 650 milliGRAM(s) Oral every 6 hours PRN  acetaminophen  IVPB .. 1000 milliGRAM(s) IV Intermittent once  ALBUTerol    90 MICROgram(s) HFA Inhaler 2 Puff(s) Inhalation every 6 hours PRN  aMIOdarone    Tablet 200 milliGRAM(s) Oral daily  aspirin enteric coated 81 milliGRAM(s) Oral daily  calcium acetate 667 milliGRAM(s) Oral three times a day with meals  chlorhexidine 2% Cloths 1 Application(s) Topical daily  dextrose 5%. 1000 milliLiter(s) IV Continuous <Continuous>  glycopyrrolate 9 MICROgram(s)/formoterol 4.8 MICROgram(s) Inhaler 2 Puff(s) Inhalation two times a day  heparin   Injectable 6500 Unit(s) IV Push every 6 hours PRN  heparin   Injectable 3000 Unit(s) IV Push every 6 hours PRN  heparin  Infusion.  Unit(s)/Hr IV Continuous <Continuous>  insulin lispro (HumaLOG) corrective regimen sliding scale   SubCutaneous three times a day before meals  insulin lispro (HumaLOG) corrective regimen sliding scale   SubCutaneous at bedtime  ketotifen 0.025% Ophthalmic Solution 1 Drop(s) Both EYES two times a day  lidocaine 1% Injectable 20 milliLiter(s) Local Injection once  lidocaine 2% Gel 1 Application(s) Topical every 8 hours  methimazole 5 milliGRAM(s) Oral daily  metoprolol tartrate 100 milliGRAM(s) Oral two times a day  pantoprazole    Tablet 40 milliGRAM(s) Oral before breakfast  senna 2 Tablet(s) Oral at bedtime  simvastatin 40 milliGRAM(s) Oral at bedtime  tiotropium 18 MICROgram(s) Capsule 1 Capsule(s) Inhalation daily                            12.2   8.98  )-----------( 172      ( 04 Jul 2020 05:25 )             39.7       07-04    131<L>  |  93<L>  |  72<H>  ----------------------------<  146<H>  4.6   |  24  |  9.27<H>    Ca    8.5      04 Jul 2020 05:25              T(C): 36.8 (07-04-20 @ 09:05), Max: 37.1 (07-03-20 @ 15:43)  HR: 90 (07-04-20 @ 09:05) (88 - 104)  BP: 111/67 (07-04-20 @ 09:05) (98/64 - 124/72)  RR: 18 (07-04-20 @ 08:26) (18 - 19)  SpO2: 100% (07-04-20 @ 08:26) (97% - 100%)  Wt(kg): --    I&O's Summary      Gen: Appears well in NAD  HEENT:  (-)icterus (-)pallor  CV: N S1 S2 1/6 DEJAH (+)2 Pulses B/l  Resp:  Clear to ausculatation B/L, decreased at left base normal effort  GI: (+) BS Soft, NT, ND  Lymph:  (+) minimal Edema, (-)obvious lymphadenopathy  Skin: Warm to touch, Normal turgor  Psych: Appropriate mood and affect    TTE: < from: Transthoracic Echocardiogram (06.24.20 @ 07:47) >  TECHNICALLY VERY DIFFICULT STUDY, POOR ACOUSTIC WINDOWS    1. Mitral annular calcification. Mild mitral regurgitation.    2. Increased relative wall thickness with normal left  ventricular (LV) mass index, consistent with concentric LV  remodeling.  3. Endocardium not well visualized; probably moderate  global left ventricular systolic dysfunction based on very  limited views.   Segmental wall motion could not be  assessed.  Septal motion consistent with a conduction  defect.  4. Mild right atrial enlargement.  5. Right ventricular enlargement with decreased RV systolic  function.  6. RV systolic pressure is 54 mm Hg. Moderate pulmonary  hypertension.    < end of copied text >        ASSESSMENT/PLAN: 69y Male  CAD s/p multiple stents, s/p CABG (9/2019 course c/b Afib and LT pleural effusion), Normal LV systolic function, severe Diastolic, RV dysfx CHF, HTN, HLD, DM2, Afib on coumadin and ESRD on HD (Tu, Thurs, Sat at [facility]), hyperthyroidism, presented to ER for pain and swelling of left leg since 3 days.    - LE dopplers negative for dVT   - s/p thoracentesis  - tolerated procedure well in terms of cv perspective   - AC resumed with hep gtt  - continue with medical management of known CAD with stents with sapt     Kalie Lau MD

## 2020-07-04 NOTE — PROGRESS NOTE ADULT - SUBJECTIVE AND OBJECTIVE BOX
Time of Visit:  Patient seen and examined.     MEDICATIONS  (STANDING):  acetaminophen  IVPB .. 1000 milliGRAM(s) IV Intermittent once  aMIOdarone    Tablet 200 milliGRAM(s) Oral daily  aspirin enteric coated 81 milliGRAM(s) Oral daily  calcium acetate 667 milliGRAM(s) Oral three times a day with meals  chlorhexidine 2% Cloths 1 Application(s) Topical daily  dextrose 5%. 1000 milliLiter(s) (50 mL/Hr) IV Continuous <Continuous>  glycopyrrolate 9 MICROgram(s)/formoterol 4.8 MICROgram(s) Inhaler 2 Puff(s) Inhalation two times a day  heparin  Infusion.  Unit(s)/Hr (15 mL/Hr) IV Continuous <Continuous>  insulin lispro (HumaLOG) corrective regimen sliding scale   SubCutaneous three times a day before meals  insulin lispro (HumaLOG) corrective regimen sliding scale   SubCutaneous at bedtime  ketotifen 0.025% Ophthalmic Solution 1 Drop(s) Both EYES two times a day  lidocaine 1% Injectable 20 milliLiter(s) Local Injection once  lidocaine 2% Gel 1 Application(s) Topical every 8 hours  methimazole 5 milliGRAM(s) Oral daily  metoprolol tartrate 100 milliGRAM(s) Oral two times a day  pantoprazole    Tablet 40 milliGRAM(s) Oral before breakfast  senna 2 Tablet(s) Oral at bedtime  simvastatin 40 milliGRAM(s) Oral at bedtime  tiotropium 18 MICROgram(s) Capsule 1 Capsule(s) Inhalation daily      MEDICATIONS  (PRN):  acetaminophen   Tablet .. 650 milliGRAM(s) Oral every 6 hours PRN Moderate Pain (4 - 6)  ALBUTerol    90 MICROgram(s) HFA Inhaler 2 Puff(s) Inhalation every 6 hours PRN Shortness of Breath and/or Wheezing  heparin   Injectable 6500 Unit(s) IV Push every 6 hours PRN For aPTT less than 40  heparin   Injectable 3000 Unit(s) IV Push every 6 hours PRN For aPTT between 40 - 57       Medications up to date at time of exam.      PHYSICAL EXAMINATION:  Patient has no new complaints.  GENERAL: The patient is a well-developed, well-nourished, in no apparent distress.     Vital Signs Last 24 Hrs  T(C): 36.6 (04 Jul 2020 15:56), Max: 37 (03 Jul 2020 23:20)  T(F): 97.9 (04 Jul 2020 15:56), Max: 98.6 (03 Jul 2020 23:20)  HR: 82 (04 Jul 2020 15:56) (82 - 104)  BP: 110/68 (04 Jul 2020 15:56) (98/64 - 124/72)  BP(mean): --  RR: 18 (04 Jul 2020 15:56) (18 - 18)  SpO2: 100% (04 Jul 2020 15:56) (98% - 100%)   (if applicable)    Chest Tube:  500 ml/16 hours    HEENT: Head is normocephalic and atraumatic. Extraocular muscles are intact. Mucous membranes are moist.     NECK: Supple, no palpable adenopathy.    LUNGS: left side chest tube , off suction    HEART: Regular rate and rhythm without murmur.    ABDOMEN: Soft, nontender, and nondistended.  No hepatosplenomegaly is noted.    : No painful voiding, no pelvic pain    EXTREMITIES: Without any cyanosis, clubbing, rash, lesions or edema.    NEUROLOGIC: Awake, alert, oriented, grossly intact    SKIN: Warm, dry, good turgor.      LABS:                        12.2   8.98  )-----------( 172      ( 04 Jul 2020 05:25 )             39.7     07-04    131<L>  |  93<L>  |  72<H>  ----------------------------<  146<H>  4.6   |  24  |  9.27<H>    Ca    8.5      04 Jul 2020 05:25      PT/INR - ( 03 Jul 2020 06:25 )   PT: 16.8 sec;   INR: 1.46 ratio         PTT - ( 04 Jul 2020 07:03 )  PTT:61.9 sec                    MICROBIOLOGY: (if applicable)    RADIOLOGY & ADDITIONAL STUDIES:  EKG:   CXR:< from: Xray Chest 1 View- PORTABLE-Routine (07.04.20 @ 14:42) >    EXAM:  XR CHEST PORTABLE ROUTINE 1V                            PROCEDURE DATE:  07/04/2020          INTERPRETATION:  CLINICAL INDICATION: 69 years  Male with s/p left chest tube placement.    COMPARISON: 7/3/2020    AP view of the chest again demonstrates a pigtail catheter in the left hemithorax.  There is persistent left basilar opacity secondary to effusion and/or infiltrate. There is no pneumothorax. Right lung volume loss is reidentified. There is no right pleural effusion.    There is mild pulmonary vascular congestion.     The heart is markedly enlarged. The mediastinum and ainsley cannot be  assessed due to rotation. Sternotomy wires are reidentified.    A left subclavian and axillary vascular stent is reidentified.    IMPRESSION:    Left pigtail thoracostomy tube. Left effusion and/or infiltrate unchanged.     Mild pulmonary vascular congestion. Cardiomegaly. Sternotomy.                          HUSSAIN LINDSEY M.D., ATTENDING RADIOLOGIST  This document has been electronically signed. Jul 4 2020  3:02PM                < end of copied text >    ECHO:    IMPRESSION: 69y Male PAST MEDICAL & SURGICAL HISTORY:  ESRD (end stage renal disease) on dialysis: T-Th-Sat  Stented coronary artery: total 15 stents from [id number]  Hyperthyroidism  H/O: CVA: after cardiac stent placed 12/15/09 -no residual  Heart Attack: 2/1/07 with subsequent stent  Coronary Stent: [id number] 10 stents,  to Ramus 1/2015, cath 01/2016 ( see results in HPI)  Hypercholesterolemia  CAD (Coronary Artery Disease)  DM (Diabetes Mellitus): x 4 yrs without N/N/R  HTN (Hypertension)  S/P CABG x 4  Hemodialysis access, AV graft: 5/2015, L arm  S/P cataract extraction  Boil of Buttock: 2006 and 2008   p/w           IMP: This is a  69 man  with CAD s/p multiple stents, s/p CABG (9/2019 course c/b Afib and LT pleural effusion), Diastolic CHF, HTN, HLD, DM2, Afib on coumadin and ESRD on HD (Tu, Thurs, Sat at [facility]), hyperthyroidism, presented to ER for pain and swelling of left leg since 3 days. Admitted to medicine for LLE cellulitis.  CXR shows gross heart enlargement and mild to moderate left base pleural pulmonary reaction again noted. Sternotomy again seen. Negative for DVT on Doppler study. Blood Cx NGTD. Pt was started on Unasyn and po Doxy, but changed to Vancomycin due to non healing cellulitis. ID consulted.  Improved leg swelling and erythema with Vanco.   Pt. with ESRD on HD, followed by nephro Dr. Calle, HD tolerated. Pt. with significant cardiac hx including AF on Coumadin, followed by cardiology. Coumadin dose adjusted for goal INR 2-3. ECHO resulted EF 40-45% (previous EF 55% in Jan. 2020.  CT chest reveal enlarging left loculated pleural effusion. Pat is not in any resp distress and O2 sat 100 % 2 LPM via NC. He will require chest tube placement but INR is elevated due to coumadin. Thoracic surgery unable to place chest tube, s/p thoracentesis 6/30 .. 150 ml . Post procedure cxr neg for ptx.  IR placed Left chest tube 7/2 out put 1700 ml /24 hr        Sugg;  -resume therapeutic anticoag with hep and coumadin  -f/u pleural fluid cytology  -dialysis as per neph  -continue O2 via NC as needed  -cxr in AM  -monitor chest tube out put 500 ml /18 hours as per RN  -thoracic f/u noted

## 2020-07-04 NOTE — PROGRESS NOTE ADULT - SUBJECTIVE AND OBJECTIVE BOX
Patient seen and examined at bedside  s/p IR Pigtail placement yesterday for L pleural effusion  pt without complaint this AM    ICU Vital Signs Last 24 Hrs  T(C): 36.7 (04 Jul 2020 12:08), Max: 37.1 (03 Jul 2020 15:43)  T(F): 98.1 (04 Jul 2020 12:08), Max: 98.8 (03 Jul 2020 15:43)  HR: 98 (04 Jul 2020 12:08) (88 - 104)  BP: 121/79 (04 Jul 2020 12:08) (98/64 - 124/72)  BP(mean): --  ABP: --  ABP(mean): --  RR: 18 (04 Jul 2020 08:26) (18 - 19)  SpO2: 100% (04 Jul 2020 08:26) (97% - 100%)        GENERAL: Alert, NAD  CHEST/LUNG:  respirations nonlabored, L pigtail in place with serosanguinous output, approx 400ml from yesterday            LABS:                                              12.2   8.98  )-----------( 172      ( 04 Jul 2020 05:25 )             39.7       07-04    131<L>  |  93<L>  |  72<H>  ----------------------------<  146<H>  4.6   |  24  |  9.27<H>    Ca    8.5      04 Jul 2020 05:25            RADIOLOGY & ADDITIONAL STUDIES:      CXR pending

## 2020-07-04 NOTE — PROGRESS NOTE ADULT - ATTENDING COMMENTS
Rudy Mendiola MD  [facility]  Office [phone]  Ans Serv [phone] cell - [phone]

## 2020-07-04 NOTE — CHART NOTE - NSCHARTNOTEFT_GEN_A_CORE
EVENT: Pt s/p HD today. Followed by nephrology dr. Mendiola. on heparin drip. Nurse reports old ecchymosis on left side back around pigtail placement area. no hematoma on palpation. aPTT remains in therapeutic range. CBC stable.   f/u daily CXR resulted as below. Followed by thoracic surgery.       SUBJECTIVE: Seen at bedside. denies pain or discomfort at this time.     OBJECTIVE:  Vital Signs Last 24 Hrs  T(C): 36.7 (04 Jul 2020 12:08), Max: 37 (03 Jul 2020 23:20)  T(F): 98.1 (04 Jul 2020 12:08), Max: 98.6 (03 Jul 2020 23:20)  HR: 98 (04 Jul 2020 12:08) (88 - 104)  BP: 121/79 (04 Jul 2020 12:08) (98/64 - 124/72)  BP(mean): --  RR: 18 (04 Jul 2020 08:26) (18 - 18)  SpO2: 100% (04 Jul 2020 08:26) (98% - 100%)    LABS:                        12.2   8.98  )-----------( 172      ( 04 Jul 2020 05:25 )             39.7     07-04    131<L>  |  93<L>  |  72<H>  ----------------------------<  146<H>  4.6   |  24  |  9.27<H>    Ca    8.5      04 Jul 2020 05:25        EKG:   IMGAGING:  < from: Xray Chest 1 View- PORTABLE-Routine (07.04.20 @ 14:42) >      EXAM:  XR CHEST PORTABLE ROUTINE 1V                            PROCEDURE DATE:  07/04/2020          INTERPRETATION:  CLINICAL INDICATION: 69 years  Male with s/p left chest tube placement.    COMPARISON: 7/3/2020    AP view of the chest again demonstrates a pigtail catheter in the left hemithorax.  There is persistent left basilar opacity secondary to effusion and/or infiltrate. There is no pneumothorax. Right lung volume loss is reidentified. There is no right pleural effusion.    There is mild pulmonary vascular congestion.     The heart is markedly enlarged. The mediastinum and ainsley cannot be  assessed due to rotation. Sternotomy wires are reidentified.    A left subclavian and axillary vascular stent is reidentified.    IMPRESSION:    Left pigtail thoracostomy tube. Left effusion and/or infiltrate unchanged.     Mild pulmonary vascular congestion. Cardiomegaly. Sternotomy.    HUSSAIN LINDSEY M.D., ATTENDING RADIOLOGIST  This document has been electronically signed. Jul 4 2020  3:02PM        < end of copied text >          ASSESSMENT:  HPI:  69M with CAD s/p multiple stents, s/p CABG (9/2019 course c/b Afib and LT pleural effusion), Diastolic CHF, HTN, HLD, DM2, Afib on coumadin and ESRD on HD (Tu, Thurs, Sat at [facility]), hyperthyroidism, presented to ER for pain and swelling of left leg since 3 days. Patient reports sustaining injury to posterior aspect of left leg 1 month ago when he accidentally hit his leg against his bed. He reports the wound has not healed since. Reports he developed pain and swelling of left leg since 3 days, 9/10 in severity, with difficulty ambulating. Reports he was advised to get doppler which he reviewed with hic cardiologist today, was told he has no clot and recommended to come to ER. Denies fever, chills, weakness, cough, dysuria, NVDC. (18 Jun 2020 19:29)    Admitted to medicine for LLE cellulitis. CT found large L loculated pleural effusion, recommending chest tube. Bedside thoracentesis drained 150cc of blood tinged pleural fluid. Specimen sent for pathology and culture. attempted left pig tail catheter by Dr. Torres unsuccessful. Chest tube placed 7/2 1000mL of serosanguinous fluid drained since insertion. Repeat chest xray shows improvement of pleural effusion- Heparin drip resumed.     PLAN:   Pleural effusion  -f/u daily CXR  -monitor O2 sat   -pulmonary dr. Delaney.   -Thoracic surgery following.   -monitor chest tube drainage    Afib/CAD s/p multiple stents. CABG  -c/w heparin drip  -monitor PT/aPTT  -monitor s/s bleeidng  -cardiology dr. Laguerre/Sid following    ESRD on HD  s/p HD today  -left arm precaution  -Neprho dr. Calle/Marlena.   -monitor BMP    Cellulitis LE  completed vanco  No leukocytosis. Afebrile.   Doppler neg for DVT.  -f/u ID dr. Rayo

## 2020-07-04 NOTE — PROGRESS NOTE ADULT - SUBJECTIVE AND OBJECTIVE BOX
Nephrology Followup Note - [phone] - Dr Mendiola / Dr Knight / Dr Macias / Dr Arroyo / Dr Mackey / Dr Bowen / Dr Esposito / Dr Calle  Pt seen and examined during HD   No acute events overnight.     Allergies:  lisinopril (Other)  opioid-like analgesics (Other)    Hospital Medications:   MEDICATIONS  (STANDING):  acetaminophen  IVPB .. 1000 milliGRAM(s) IV Intermittent once  aMIOdarone    Tablet 200 milliGRAM(s) Oral daily  aspirin enteric coated 81 milliGRAM(s) Oral daily  calcium acetate 667 milliGRAM(s) Oral three times a day with meals  chlorhexidine 2% Cloths 1 Application(s) Topical daily  dextrose 5%. 1000 milliLiter(s) (50 mL/Hr) IV Continuous <Continuous>  glycopyrrolate 9 MICROgram(s)/formoterol 4.8 MICROgram(s) Inhaler 2 Puff(s) Inhalation two times a day  heparin  Infusion.  Unit(s)/Hr (15 mL/Hr) IV Continuous <Continuous>  insulin lispro (HumaLOG) corrective regimen sliding scale   SubCutaneous three times a day before meals  insulin lispro (HumaLOG) corrective regimen sliding scale   SubCutaneous at bedtime  ketotifen 0.025% Ophthalmic Solution 1 Drop(s) Both EYES two times a day  lidocaine 1% Injectable 20 milliLiter(s) Local Injection once  lidocaine 2% Gel 1 Application(s) Topical every 8 hours  methimazole 5 milliGRAM(s) Oral daily  metoprolol tartrate 100 milliGRAM(s) Oral two times a day  pantoprazole    Tablet 40 milliGRAM(s) Oral before breakfast  senna 2 Tablet(s) Oral at bedtime  simvastatin 40 milliGRAM(s) Oral at bedtime  tiotropium 18 MICROgram(s) Capsule 1 Capsule(s) Inhalation daily    VITALS:  T(F): 98.3 (07-04-20 @ 09:05), Max: 98.8 (07-03-20 @ 15:43)  HR: 90 (07-04-20 @ 09:05)  BP: 111/67 (07-04-20 @ 09:05)  RR: 18 (07-04-20 @ 08:26)  SpO2: 100% (07-04-20 @ 08:26)  Wt(kg): --      PHYSICAL EXAM:  Constitutional: NAD  HEENT: anicteric sclera, oropharynx clear, MMM  Neck: No JVD  Respiratory: CTAB, no wheezes, rales or rhonchi, left pigtail chest tube   Cardiovascular: S1, S2, RRR  Gastrointestinal: BS+, soft, NT/ND  Extremities: No cyanosis or clubbing. No peripheral edema  Neurological: A/O x 3, no focal deficits  Psychiatric: Normal mood, normal affect  : No CVA tenderness. No wagner.   Skin: No rashes  Vascular Access: LUE AV access +thrill and bruit. LUE swelling     LABS:  07-04    131<L>  |  93<L>  |  72<H>  ----------------------------<  146<H>  4.6   |  24  |  9.27<H>    Ca    8.5      04 Jul 2020 05:25      Creatinine Trend: 9.27 <--, 8.17 <--, 10.70 <--, 9.21 <--, 11.90 <--, 10.20 <--, 8.35 <--                        12.2   8.98  )-----------( 172      ( 04 Jul 2020 05:25 )             39.7     Urine Studies:      RADIOLOGY & ADDITIONAL STUDIES:

## 2020-07-04 NOTE — CHART NOTE - NSCHARTNOTEFT_GEN_A_CORE
Ask to see pt for "sundown".  pt seen with slight confusion to place. Was seen earlier on rounds A&Ox3, with some difficulty recalling today's events, otherwise ok then; now little later with mild confusion to place/time. Primary Rn reports pt had similar episode last night. Chart reviewed    Pt denies feelings of discomfort. Requesting "ricarda" who he says is his dog    Brief exam  General NAD  CV: S1S2, RRR  Resp: diminished breath sound, chest tube in place patent draining serousanguinous drainage ~50 since start of night shift  Neuro: A&Ox1    Vital Signs Last 24 Hrs  T(C): 36.6 (04 Jul 2020 15:56), Max: 37 (03 Jul 2020 23:20)  T(F): 97.9 (04 Jul 2020 15:56), Max: 98.6 (03 Jul 2020 23:20)  HR: 87 (04 Jul 2020 21:03) (82 - 104)  BP: 101/56 (04 Jul 2020 21:03) (98/64 - 124/72)  BP(mean): --  RR: 17 (04 Jul 2020 21:03) (17 - 18)  SpO2: 98% (04 Jul 2020 21:03) (98% - 100%)    Mr Hardin is a 69 year old Man with hx of  CAD s/p multiple stents, s/p CABG (9/2019 course c/b Afib and LT pleural effusion), HFpEF, HTN, HLD, DM2, Afib on coumadin and ESRD on HD TTHS hyperthyroidism, who [facility] c/o 3 day hx of LLE pain and swelling, admitted for LLE; found to have on CT chest enlarging left loculated pleural effusion for which he underwent thoracentesis on 6/30 and placement of L chest tube on 7/2 in IR. Pt with sundown as was last night per primary RN who had last night also.    Acute Delirium / sundown   - symptoms disappears during the day  - dose with seroquel 12.5mg now  - melatonin PRN for sleep  - monitor  - continue with plan of care  - discussed with primary RN

## 2020-07-04 NOTE — PROGRESS NOTE ADULT - ASSESSMENT
s/p IR L pigtail placement on 7/2 for L pleural effusion.     1.CXR pending for today. Delayed secondary to dialysis. D/w Radiology tech - planned to do after patient returns from HD  2. Monitor output  3. IR f/u if output dramatically decreases or pigtail malfunction  4. Will follow  5. D/w Dr. Celestin

## 2020-07-04 NOTE — PROGRESS NOTE ADULT - ASSESSMENT
Patient is a 69y Male whom presented to the hospital with LT leg pain and swelling.  Was sent from cardiology clinic where he had gone for routine evaluation as was noted  to have RT leg swelling, to R/O DVT. ( doppler US negative for DVT).admitted for treatment of leg cellulitis   Renal consulted for ESRD. Large left loculated pleural effusion s/p L pigtail catheter.     A/P  #ESRD Tolerating HD today, with UF goal 2.5 kg, as BP tolerates. Otherwise electrolytes acceptable. LUE swelling noted, no issues cannulating AV access.   # hyponatremia . In a patient with hypervolemia and lung pathology.  fluid restriction 1.2 litres/day.  RPT BMP daily   # Anemia of CKD. stable off ROSARIO  # lytes and acid base at goal  on mx for bilateral leg cellulitis, Neg DVT.

## 2020-07-05 DIAGNOSIS — E11.40 TYPE 2 DIABETES MELLITUS WITH DIABETIC NEUROPATHY, UNSPECIFIED: ICD-10-CM

## 2020-07-05 DIAGNOSIS — I50.42 CHRONIC COMBINED SYSTOLIC (CONGESTIVE) AND DIASTOLIC (CONGESTIVE) HEART FAILURE: ICD-10-CM

## 2020-07-05 DIAGNOSIS — G93.40 ENCEPHALOPATHY, UNSPECIFIED: ICD-10-CM

## 2020-07-05 DIAGNOSIS — J90 PLEURAL EFFUSION, NOT ELSEWHERE CLASSIFIED: ICD-10-CM

## 2020-07-05 LAB
ANION GAP SERPL CALC-SCNC: 9 MMOL/L — SIGNIFICANT CHANGE UP (ref 5–17)
APTT BLD: 195.2 SEC — CRITICAL HIGH (ref 27.5–35.5)
APTT BLD: 61.2 SEC — HIGH (ref 27.5–35.5)
APTT BLD: >200 SEC — CRITICAL HIGH (ref 27.5–35.5)
BUN SERPL-MCNC: 52 MG/DL — HIGH (ref 7–18)
CALCIUM SERPL-MCNC: 8.9 MG/DL — SIGNIFICANT CHANGE UP (ref 8.4–10.5)
CHLORIDE SERPL-SCNC: 91 MMOL/L — LOW (ref 96–108)
CO2 SERPL-SCNC: 30 MMOL/L — SIGNIFICANT CHANGE UP (ref 22–31)
CREAT SERPL-MCNC: 7.39 MG/DL — HIGH (ref 0.5–1.3)
GLUCOSE BLDC GLUCOMTR-MCNC: 136 MG/DL — HIGH (ref 70–99)
GLUCOSE BLDC GLUCOMTR-MCNC: 176 MG/DL — HIGH (ref 70–99)
GLUCOSE BLDC GLUCOMTR-MCNC: 256 MG/DL — HIGH (ref 70–99)
GLUCOSE BLDC GLUCOMTR-MCNC: 93 MG/DL — SIGNIFICANT CHANGE UP (ref 70–99)
GLUCOSE SERPL-MCNC: 164 MG/DL — HIGH (ref 70–99)
HCT VFR BLD CALC: 38.8 % — LOW (ref 39–50)
HGB BLD-MCNC: 11.9 G/DL — LOW (ref 13–17)
MCHC RBC-ENTMCNC: 28.8 PG — SIGNIFICANT CHANGE UP (ref 27–34)
MCHC RBC-ENTMCNC: 30.7 GM/DL — LOW (ref 32–36)
MCV RBC AUTO: 93.9 FL — SIGNIFICANT CHANGE UP (ref 80–100)
NRBC # BLD: 0 /100 WBCS — SIGNIFICANT CHANGE UP (ref 0–0)
PLATELET # BLD AUTO: 163 K/UL — SIGNIFICANT CHANGE UP (ref 150–400)
POTASSIUM SERPL-MCNC: 3.9 MMOL/L — SIGNIFICANT CHANGE UP (ref 3.5–5.3)
POTASSIUM SERPL-SCNC: 3.9 MMOL/L — SIGNIFICANT CHANGE UP (ref 3.5–5.3)
RBC # BLD: 4.13 M/UL — LOW (ref 4.2–5.8)
RBC # FLD: 15.9 % — HIGH (ref 10.3–14.5)
SODIUM SERPL-SCNC: 130 MMOL/L — LOW (ref 135–145)
WBC # BLD: 8.08 K/UL — SIGNIFICANT CHANGE UP (ref 3.8–10.5)
WBC # FLD AUTO: 8.08 K/UL — SIGNIFICANT CHANGE UP (ref 3.8–10.5)

## 2020-07-05 PROCEDURE — 71045 X-RAY EXAM CHEST 1 VIEW: CPT | Mod: 26

## 2020-07-05 PROCEDURE — 70450 CT HEAD/BRAIN W/O DYE: CPT | Mod: 26

## 2020-07-05 RX ORDER — WARFARIN SODIUM 2.5 MG/1
10 TABLET ORAL ONCE
Refills: 0 | Status: COMPLETED | OUTPATIENT
Start: 2020-07-05 | End: 2020-07-05

## 2020-07-05 RX ADMIN — Medication 3: at 12:21

## 2020-07-05 RX ADMIN — SENNA PLUS 2 TABLET(S): 8.6 TABLET ORAL at 22:28

## 2020-07-05 RX ADMIN — Medication 81 MILLIGRAM(S): at 12:22

## 2020-07-05 RX ADMIN — Medication 667 MILLIGRAM(S): at 12:22

## 2020-07-05 RX ADMIN — TIOTROPIUM BROMIDE 1 CAPSULE(S): 18 CAPSULE ORAL; RESPIRATORY (INHALATION) at 12:22

## 2020-07-05 RX ADMIN — SIMVASTATIN 40 MILLIGRAM(S): 20 TABLET, FILM COATED ORAL at 22:28

## 2020-07-05 RX ADMIN — PANTOPRAZOLE SODIUM 40 MILLIGRAM(S): 20 TABLET, DELAYED RELEASE ORAL at 05:24

## 2020-07-05 RX ADMIN — HEPARIN SODIUM 1000 UNIT(S)/HR: 5000 INJECTION INTRAVENOUS; SUBCUTANEOUS at 08:57

## 2020-07-05 RX ADMIN — KETOTIFEN FUMARATE 1 DROP(S): 0.34 SOLUTION OPHTHALMIC at 17:16

## 2020-07-05 RX ADMIN — LIDOCAINE 1 APPLICATION(S): 4 CREAM TOPICAL at 05:24

## 2020-07-05 RX ADMIN — GLYCOPYRROLATE AND FORMOTEROL FUMARATE 2 PUFF(S): 9; 4.8 AEROSOL, METERED RESPIRATORY (INHALATION) at 12:20

## 2020-07-05 RX ADMIN — KETOTIFEN FUMARATE 1 DROP(S): 0.34 SOLUTION OPHTHALMIC at 05:24

## 2020-07-05 RX ADMIN — HEPARIN SODIUM 0 UNIT(S)/HR: 5000 INJECTION INTRAVENOUS; SUBCUTANEOUS at 23:06

## 2020-07-05 RX ADMIN — WARFARIN SODIUM 10 MILLIGRAM(S): 2.5 TABLET ORAL at 22:28

## 2020-07-05 RX ADMIN — Medication 667 MILLIGRAM(S): at 17:16

## 2020-07-05 RX ADMIN — CHLORHEXIDINE GLUCONATE 1 APPLICATION(S): 213 SOLUTION TOPICAL at 12:23

## 2020-07-05 RX ADMIN — HEPARIN SODIUM 1000 UNIT(S)/HR: 5000 INJECTION INTRAVENOUS; SUBCUTANEOUS at 15:40

## 2020-07-05 RX ADMIN — HEPARIN SODIUM 0 UNIT(S)/HR: 5000 INJECTION INTRAVENOUS; SUBCUTANEOUS at 07:45

## 2020-07-05 RX ADMIN — AMIODARONE HYDROCHLORIDE 200 MILLIGRAM(S): 400 TABLET ORAL at 05:23

## 2020-07-05 RX ADMIN — Medication 667 MILLIGRAM(S): at 07:55

## 2020-07-05 NOTE — PROGRESS NOTE ADULT - SUBJECTIVE AND OBJECTIVE BOX
Patient is a 69y old  Male who presents with a chief complaint of leg pain (2020 11:58)    PATIENT IS SEEN AND EXAMINED IN MEDICAL FLOOR.      ALLERGIES:  lisinopril (Other)  opioid-like analgesics (Other)      Daily Weight in k.6 (2020 05:12)    VITALS:    Vital Signs Last 24 Hrs  T(C): 36.3 (2020 15:30), Max: 36.6 (2020 00:06)  T(F): 97.4 (2020 15:30), Max: 97.8 (2020 00:06)  HR: 84 (2020 15:30) (82 - 87)  BP: 109/54 (2020 15:30) (99/72 - 109/84)  BP(mean): --  RR: 18 (2020 15:30) (17 - 18)  SpO2: 98% (2020 15:30) (90% - 98%)    LABS:    CBC Full  -  ( 2020 06:40 )  WBC Count : 8.08 K/uL  RBC Count : 4.13 M/uL  Hemoglobin : 11.9 g/dL  Hematocrit : 38.8 %  Platelet Count - Automated : 163 K/uL  Mean Cell Volume : 93.9 fl  Mean Cell Hemoglobin : 28.8 pg  Mean Cell Hemoglobin Concentration : 30.7 gm/dL  Auto Neutrophil # : x  Auto Lymphocyte # : x  Auto Monocyte # : x  Auto Eosinophil # : x  Auto Basophil # : x  Auto Neutrophil % : x  Auto Lymphocyte % : x  Auto Monocyte % : x  Auto Eosinophil % : x  Auto Basophil % : x    PTT - ( 2020 15:12 )  PTT:61.2 sec  -    130<L>  |  91<L>  |  52<H>  ----------------------------<  164<H>  3.9   |  30  |  7.39<H>    Ca    8.9      2020 06:40      CAPILLARY BLOOD GLUCOSE    POCT Blood Glucose.: 93 mg/dL (2020 16:26)  POCT Blood Glucose.: 256 mg/dL (2020 11:41)  POCT Blood Glucose.: 136 mg/dL (2020 07:36)  POCT Blood Glucose.: 145 mg/dL (2020 21:02)  POCT Blood Glucose.: 210 mg/dL (2020 16:44)      Creatinine Trend: 7.39<--, 9.27<--, 8.17<--, 10.70<--, 9.21<--, 11.90<--  I&O's Summary    2020 07:01  -  2020 07:00  --------------------------------------------------------  IN: 0 mL / OUT: 2820 mL / NET: -2820 mL      .Body Fluid pleural left   @ 23:29   No growth  --    No polymorphonuclear leukocytes seen  No organisms seen  by cytocentrifuge      .Body Fluid Pleural Fluid   @ 22:58   Culture is being performed.  --  --      .Blood Blood   @ 22:15   No Growth Final  --  --      MEDICATIONS:    MEDICATIONS  (STANDING):  aMIOdarone    Tablet 200 milliGRAM(s) Oral daily  aspirin enteric coated 81 milliGRAM(s) Oral daily  calcium acetate 667 milliGRAM(s) Oral three times a day with meals  chlorhexidine 2% Cloths 1 Application(s) Topical daily  dextrose 5%. 1000 milliLiter(s) (50 mL/Hr) IV Continuous <Continuous>  heparin  Infusion.  Unit(s)/Hr (15 mL/Hr) IV Continuous <Continuous>  insulin lispro (HumaLOG) corrective regimen sliding scale   SubCutaneous three times a day before meals  insulin lispro (HumaLOG) corrective regimen sliding scale   SubCutaneous at bedtime  ketotifen 0.025% Ophthalmic Solution 1 Drop(s) Both EYES two times a day  methimazole 5 milliGRAM(s) Oral daily  metoprolol tartrate 100 milliGRAM(s) Oral two times a day  pantoprazole    Tablet 40 milliGRAM(s) Oral before breakfast  senna 2 Tablet(s) Oral at bedtime  simvastatin 40 milliGRAM(s) Oral at bedtime      MEDICATIONS  (PRN):  heparin   Injectable 6500 Unit(s) IV Push every 6 hours PRN For aPTT less than 40  heparin   Injectable 3000 Unit(s) IV Push every 6 hours PRN For aPTT between 40 - 57        REVIEW OF SYSTEMS:                           ALL ROS DONE [ X   ]      CONSTITUTIONAL:  LETHARGIC [   ], FEVER [   ], UNRESPONSIVE [   ]  CVS:  CP  [   ], SOB, [   ], PALPITATIONS [   ], DIZZYNESS [   ]  RS: COUGH [   ], SPUTUM [   ]  GI: ABDOMINAL PAIN [   ], NAUSEA [   ], VOMITINGS [   ], DIARRHEA [   ], CONSTIPATION [   ]  :  DYSURIA [   ], NOCTURIA [   ], INCREASED FREQUENCY [   ], DRIBLING [   ],  SKELETAL: PAINFUL JOINTS [   ], SWOLLEN JOINTS [   ], NECK ACHE [   ], LOW BACK ACHE [   ],  SKIN : ULCERS [   ], RASH [   ], ITCHING [   ]  CNS: HEAD ACHE [   ], DOUBLE VISION [   ], BLURRED VISION [   ], AMS / CONFUSION [   ], SEIZURES [   ], WEAKNESS [   ],TINGLING / NUMBNESS [   ]    PHYSICAL EXAMINATION:    GENERAL APPEARANCE: NO DISTRESS  HEENT:  NO PALLOR, NO  JVD,  NO   NODES, NECK SUPPLE  CVS: S1 +, S2 +,   RS: AEEB,  OCCASIONAL  RALES +,   NO RONCHI  ABD: SOFT, NT, NO, BS +  EXT: PE +, ERYTHEMA WITH INDURATION OF LEFT LEG +  SKIN: WARM,   SKELETAL:  ROM ACCEPTABLE  CNS:  AAO X  1-2  , NO  DEFICITS      RADIOLOGY :    < from: US Duplex Venous Lower Ext Complete, Bilateral (20 @ 11:32) >  IMPRESSION:   No evidence of deep venous thrombosis in either lower extremity.    < end of copied text >    < from: Xray Chest 1 View-PORTABLE IMMEDIATE (20 @ 17:48) >  IMPRESSION: Unchanged chest as above.    < end of copied text >          ASSESSMENT :     Cellulitis  ESRD (end stage renal disease) on dialysis  Stented coronary artery  Hyperthyroidism  CKD (chronic kidney disease)  H/O: CVA  Heart Attack  Coronary Stent  Hypercholesterolemia  CAD (Coronary Artery Disease)  DM (Diabetes Mellitus)  HTN (Hypertension)  S/P CABG x 4  Hemodialysis access, AV graft  S/P cataract extraction  Boil of Buttock      PLAN:  HPI:  69M with CAD s/p multiple stents, s/p CABG (2019 course c/b Afib and LT pleural effusion), Diastolic CHF, HTN, HLD, DM2, Afib on coumadin and ESRD on HD (, Thurs, Sat at [facility]), hyperthyroidism, presented to ER for pain and swelling of left leg since 3 days. Patient reports sustaining injury to posterior aspect of left leg 1 month ago when he accidentally hit his leg against his bed. He reports the wound has not healed since. Reports he developed pain and swelling of left leg since 3 days, 9/10 in severity, with difficulty ambulating. Reports he was advised to get doppler which he reviewed with [facility] cardiologist today, was told he has no clot and recommended to come to ER. Denies fever, chills, weakness, cough, dysuria, NVDC. (2020 19:29)    -  CELLULITIS OF LEFT LEG, DIABETIC LEFT FOOT ULCER  - IMPROVED ON VANCOMYCIN [PER ID RECOMMENDATION,  BLOOD  CXS NGTD. ID F/UP BY DR. DUEÑAS    - LOCULATED LEFT SIDED PLEURAL EFFUSION S/P IR PLACED PIG TAIL CATHETER ON  2020 - PULMONOLOGY CONSULT & THORACIC SURGERY CONSULT IN PROGRESS. CASE DISCUSSED WITH PATIENT AND SON.   - ACUTE METABOLIC ENCEPHALOPATHY - AVOID DELIRIOGENIC AGENTS, FREQUENT RE-ORIENTATION  - LEFT ARM SWELLING -  ULTRASOUND NEGATIVE FOR DVT  -  ESRD ON HD - NEPHROLOGY CONSULT IN PROGRESS  - DIASTOLIC CHF - TTE W/ EVIDENCE OF RIGHT/LEFT VENTRICULAR SYSTOLIC DYSFUNCTION, PULMONARY HTN, MR, STRESS TEST-  WITHOUT NEW PERFUSION ABNORMALITIES; CARDIOLOGY CONSULT IN PROGRESS  - A/FIB ON COUMADIN - HOLD COUMADIN, PLACED ON HEPARIN DRIP; RESUME COUMADIN AND F/UP INR TO 2-3  -  PAD, DIABETIC PERIPHERAL NEUROPATHY - PAIN MGMT CONSULT IN PROGRESS  - CASE DISCUSSED EXTENSIVELY WITH SON [SYLVAIN RENTERIA ] AND PATIENT. DISCUSSED RISKS/BENEFITS OF CHEST TUBE PLACEMENT WITH SON, WHO AGREED TO PROCEED WITH INTERVENTION.  -  ESRD ON HD  -  PAD, DIABETIC PERIPHERAL NEUROPATHY  -  GI AND DVT PROPHYLAXIS  -  DR. ASHA DIAZ Patient is a 69y old  Male who presents with a chief complaint of leg pain (2020 11:58)    PATIENT IS SEEN AND EXAMINED IN MEDICAL FLOOR.      ALLERGIES:  lisinopril (Other)  opioid-like analgesics (Other)      Daily Weight in k.6 (2020 05:12)    VITALS:    Vital Signs Last 24 Hrs  T(C): 36.3 (2020 15:30), Max: 36.6 (2020 00:06)  T(F): 97.4 (2020 15:30), Max: 97.8 (2020 00:06)  HR: 84 (2020 15:30) (82 - 87)  BP: 109/54 (2020 15:30) (99/72 - 109/84)  BP(mean): --  RR: 18 (2020 15:30) (17 - 18)  SpO2: 98% (2020 15:30) (90% - 98%)    LABS:    CBC Full  -  ( 2020 06:40 )  WBC Count : 8.08 K/uL  RBC Count : 4.13 M/uL  Hemoglobin : 11.9 g/dL  Hematocrit : 38.8 %  Platelet Count - Automated : 163 K/uL  Mean Cell Volume : 93.9 fl  Mean Cell Hemoglobin : 28.8 pg  Mean Cell Hemoglobin Concentration : 30.7 gm/dL  Auto Neutrophil # : x  Auto Lymphocyte # : x  Auto Monocyte # : x  Auto Eosinophil # : x  Auto Basophil # : x  Auto Neutrophil % : x  Auto Lymphocyte % : x  Auto Monocyte % : x  Auto Eosinophil % : x  Auto Basophil % : x    PTT - ( 2020 15:12 )  PTT:61.2 sec  -    130<L>  |  91<L>  |  52<H>  ----------------------------<  164<H>  3.9   |  30  |  7.39<H>    Ca    8.9      2020 06:40      CAPILLARY BLOOD GLUCOSE    POCT Blood Glucose.: 93 mg/dL (2020 16:26)  POCT Blood Glucose.: 256 mg/dL (2020 11:41)  POCT Blood Glucose.: 136 mg/dL (2020 07:36)  POCT Blood Glucose.: 145 mg/dL (2020 21:02)  POCT Blood Glucose.: 210 mg/dL (2020 16:44)      Creatinine Trend: 7.39<--, 9.27<--, 8.17<--, 10.70<--, 9.21<--, 11.90<--  I&O's Summary    2020 07:01  -  2020 07:00  --------------------------------------------------------  IN: 0 mL / OUT: 2820 mL / NET: -2820 mL      .Body Fluid pleural left   @ 23:29   No growth  --    No polymorphonuclear leukocytes seen  No organisms seen  by cytocentrifuge      .Body Fluid Pleural Fluid   @ 22:58   Culture is being performed.  --  --      .Blood Blood   @ 22:15   No Growth Final  --  --      MEDICATIONS:    MEDICATIONS  (STANDING):  aMIOdarone    Tablet 200 milliGRAM(s) Oral daily  aspirin enteric coated 81 milliGRAM(s) Oral daily  calcium acetate 667 milliGRAM(s) Oral three times a day with meals  chlorhexidine 2% Cloths 1 Application(s) Topical daily  dextrose 5%. 1000 milliLiter(s) (50 mL/Hr) IV Continuous <Continuous>  heparin  Infusion.  Unit(s)/Hr (15 mL/Hr) IV Continuous <Continuous>  insulin lispro (HumaLOG) corrective regimen sliding scale   SubCutaneous three times a day before meals  insulin lispro (HumaLOG) corrective regimen sliding scale   SubCutaneous at bedtime  ketotifen 0.025% Ophthalmic Solution 1 Drop(s) Both EYES two times a day  methimazole 5 milliGRAM(s) Oral daily  metoprolol tartrate 100 milliGRAM(s) Oral two times a day  pantoprazole    Tablet 40 milliGRAM(s) Oral before breakfast  senna 2 Tablet(s) Oral at bedtime  simvastatin 40 milliGRAM(s) Oral at bedtime      MEDICATIONS  (PRN):  heparin   Injectable 6500 Unit(s) IV Push every 6 hours PRN For aPTT less than 40  heparin   Injectable 3000 Unit(s) IV Push every 6 hours PRN For aPTT between 40 - 57        REVIEW OF SYSTEMS:                           ALL ROS DONE [ X   ]      CONSTITUTIONAL:  LETHARGIC [   ], FEVER [   ], UNRESPONSIVE [   ]  CVS:  CP  [   ], SOB, [   ], PALPITATIONS [   ], DIZZYNESS [   ]  RS: COUGH [   ], SPUTUM [   ]  GI: ABDOMINAL PAIN [   ], NAUSEA [   ], VOMITINGS [   ], DIARRHEA [   ], CONSTIPATION [   ]  :  DYSURIA [   ], NOCTURIA [   ], INCREASED FREQUENCY [   ], DRIBLING [   ],  SKELETAL: PAINFUL JOINTS [   ], SWOLLEN JOINTS [   ], NECK ACHE [   ], LOW BACK ACHE [   ],  SKIN : ULCERS [   ], RASH [   ], ITCHING [   ]  CNS: HEAD ACHE [   ], DOUBLE VISION [   ], BLURRED VISION [   ], AMS / CONFUSION [   ], SEIZURES [   ], WEAKNESS [   ],TINGLING / NUMBNESS [   ]    PHYSICAL EXAMINATION:    GENERAL APPEARANCE: NO DISTRESS  HEENT:  NO PALLOR, NO  JVD,  NO   NODES, NECK SUPPLE  CVS: S1 +, S2 +,   RS: AEEB,  OCCASIONAL  RALES +,   NO RONCHI  ABD: SOFT, NT, NO, BS +  EXT: PE +, ERYTHEMA WITH INDURATION OF LEFT LEG +  SKIN: WARM,   SKELETAL:  ROM ACCEPTABLE  CNS:  AAO X  1-2  , NO  DEFICITS      RADIOLOGY :    < from: US Duplex Venous Lower Ext Complete, Bilateral (20 @ 11:32) >  IMPRESSION:   No evidence of deep venous thrombosis in either lower extremity.    < end of copied text >    < from: Xray Chest 1 View-PORTABLE IMMEDIATE (20 @ 17:48) >  IMPRESSION: Unchanged chest as above.    < end of copied text >          ASSESSMENT :     Cellulitis  ESRD (end stage renal disease) on dialysis  Stented coronary artery  Hyperthyroidism  CKD (chronic kidney disease)  H/O: CVA  Heart Attack  Coronary Stent  Hypercholesterolemia  CAD (Coronary Artery Disease)  DM (Diabetes Mellitus)  HTN (Hypertension)  S/P CABG x 4  Hemodialysis access, AV graft  S/P cataract extraction  Boil of Buttock      PLAN:  HPI:  69M with CAD s/p multiple stents, s/p CABG (2019 course c/b Afib and LT pleural effusion), Diastolic CHF, HTN, HLD, DM2, Afib on coumadin and ESRD on HD (, Thurs, Sat at [facility]), hyperthyroidism, presented to ER for pain and swelling of left leg since 3 days. Patient reports sustaining injury to posterior aspect of left leg 1 month ago when he accidentally hit his leg against his bed. He reports the wound has not healed since. Reports he developed pain and swelling of left leg since 3 days, 9/10 in severity, with difficulty ambulating. Reports he was advised to get doppler which he reviewed with hic cardiologist today, was told he has no clot and recommended to come to ER. Denies fever, chills, weakness, cough, dysuria, NVDC. (2020 19:29)      - AMS DUE TO  METABOLIC ENCEPHALOPATHY AND VASCULAR DEMENTIA  - AVOID DELIRIOGENIC AGENTS, FREQUENT RE-ORIENTATION. CT HEAD IS DONE ON 2020, NO ACUTE CHANGES , BUT CHRONIC LACUNAR INFARCTS. OBTAIN NEUROLOGY CONSULT IN AM. OBTAIN MRI OF BRAIN IF RECOMMENDED  BY NEUROLOGIST. D/W PATIENT'S SON AND HIS CARDIOLOGIST       -  CELLULITIS OF LEFT LEG, DIABETIC LEFT FOOT ULCER  - IMPROVED ON VANCOMYCIN [PER ID RECOMMENDATION,  BLOOD  CXS NGTD. ID F/UPIS IN PROGRESS  BY DR. DUEÑAS    - LOCULATED LEFT SIDED PLEURAL EFFUSION S/P IR PLACED PIG TAIL CATHETER ON  2020, STILL IS DRAINING . RPT CT CHEST IN AM WITH OUT CONTRAST.  - PULMONOLOGY CONSULT & THORACIC SURGERY CONSULT IN PROGRESS. CASE DISCUSSED WITH PATIENT AND SON.   - LEFT ARM SWELLING -  ULTRASOUND NEGATIVE FOR DVT  -  ESRD ON HD - NEPHROLOGY CONSULT IN PROGRESS  - DIASTOLIC CHF - TTE W/ EVIDENCE OF RIGHT/LEFT VENTRICULAR SYSTOLIC DYSFUNCTION, PULMONARY HTN, MR, STRESS TEST-  WITHOUT NEW PERFUSION ABNORMALITIES; CARDIOLOGY CONSULT IN PROGRESS  - A/FIB ON COUMADIN - RESTARTED COUMADIN, PLACED ON HEPARIN DRIP;  F/UP INR TO 2-3 AND PTT   -  PAD, DIABETIC PERIPHERAL NEUROPATHY - PAIN MGMT CONSULT IN PROGRESS  - CASE DISCUSSED EXTENSIVELY WITH SON [SYLVAIN RENTERIA ] AND PATIENT. DISCUSSED RISKS/BENEFITS OF CHEST TUBE PLACEMENT WITH SON, WHO AGREED TO PROCEED WITH INTERVENTION.  -  PAD, DIABETIC PERIPHERAL NEUROPATHY  -  GI AND DVT PROPHYLAXIS  -  DR. ASHA DIAZ

## 2020-07-05 NOTE — PROGRESS NOTE ADULT - SUBJECTIVE AND OBJECTIVE BOX
Time of Visit:  Patient seen and examined.     MEDICATIONS  (STANDING):  aMIOdarone    Tablet 200 milliGRAM(s) Oral daily  aspirin enteric coated 81 milliGRAM(s) Oral daily  calcium acetate 667 milliGRAM(s) Oral three times a day with meals  chlorhexidine 2% Cloths 1 Application(s) Topical daily  dextrose 5%. 1000 milliLiter(s) (50 mL/Hr) IV Continuous <Continuous>  heparin  Infusion.  Unit(s)/Hr (15 mL/Hr) IV Continuous <Continuous>  insulin lispro (HumaLOG) corrective regimen sliding scale   SubCutaneous three times a day before meals  insulin lispro (HumaLOG) corrective regimen sliding scale   SubCutaneous at bedtime  ketotifen 0.025% Ophthalmic Solution 1 Drop(s) Both EYES two times a day  methimazole 5 milliGRAM(s) Oral daily  metoprolol tartrate 100 milliGRAM(s) Oral two times a day  pantoprazole    Tablet 40 milliGRAM(s) Oral before breakfast  senna 2 Tablet(s) Oral at bedtime  simvastatin 40 milliGRAM(s) Oral at bedtime      MEDICATIONS  (PRN):  heparin   Injectable 6500 Unit(s) IV Push every 6 hours PRN For aPTT less than 40  heparin   Injectable 3000 Unit(s) IV Push every 6 hours PRN For aPTT between 40 - 57       Medications up to date at time of exam.      PHYSICAL EXAMINATION:  Patient has no new complaints.  GENERAL: The patient is a well-developed, well-nourished, in no apparent distress.     Vital Signs Last 24 Hrs  T(C): 36.3 (05 Jul 2020 15:30), Max: 36.6 (05 Jul 2020 00:06)  T(F): 97.4 (05 Jul 2020 15:30), Max: 97.8 (05 Jul 2020 00:06)  HR: 84 (05 Jul 2020 15:30) (82 - 87)  BP: 109/54 (05 Jul 2020 15:30) (99/72 - 109/84)  BP(mean): --  RR: 18 (05 Jul 2020 15:30) (17 - 18)  SpO2: 98% (05 Jul 2020 15:30) (90% - 98%)   (if applicable)    Chest Tube (if applicable)    HEENT: Head is normocephalic and atraumatic. Extraocular muscles are intact. Mucous membranes are moist.     NECK: Supple, no palpable adenopathy.    LUNGS: Clear to auscultation, no wheezing, rales, or rhonchi.    HEART: Regular rate and rhythm without murmur.    ABDOMEN: Soft, nontender, and nondistended.  No hepatosplenomegaly is noted.    : No painful voiding, no pelvic pain    EXTREMITIES: Without any cyanosis, clubbing, rash, lesions or edema.    NEUROLOGIC: Awake, alert, oriented, grossly intact    SKIN: Warm, dry, good turgor.      LABS:                        11.9   8.08  )-----------( 163      ( 05 Jul 2020 06:40 )             38.8     07-05    130<L>  |  91<L>  |  52<H>  ----------------------------<  164<H>  3.9   |  30  |  7.39<H>    Ca    8.9      05 Jul 2020 06:40      PTT - ( 05 Jul 2020 15:12 )  PTT:61.2 sec                    MICROBIOLOGY: (if applicable)    RADIOLOGY & ADDITIONAL STUDIES:  EKG:   CXR:< from: Xray Chest 1 View- PORTABLE-Routine (07.05.20 @ 14:30) >    EXAM:  XR CHEST PORTABLE ROUTINE 1V                            PROCEDURE DATE:  07/05/2020          INTERPRETATION:  AP semierect chest on July 5, 2020 at 2:03 PM. Patient is being followed for left chest tube.    Heart enlargement, sternotomy, andcatheter left chest tube remain.    There is a diffuse interstitial pattern in the lungs similar to July 4.    There are slight basilar effusions left greater than right. There is no pneumothorax.    Chest is similar to July 4.    IMPRESSION: Unchanged chest as above.                CAITLIN VALDEZ M.D., ATTENDING RADIOLOGIST  This document has been electronically signed. Jul 5 2020  4:00PM              CT head:< from: CT Head No Cont (07.05.20 @ 13:20) >    EXAM:  CT BRAIN                            PROCEDURE DATE:  07/05/2020          INTERPRETATION:  CT BRAIN:    CLINICAL HISTORY: 69 years year old Male with confusion. Coronary artery disease, end-stage renal disease, CHF, A. fib and cellulitis.    COMPARISON: Head CT 1/21/2020 and 12/16/2009 and MRI brain 1/21/2020    Helical axial images were obtained from the base of skull to the vertex. No IV contrast was administered.    There is a chronic white matter infarct in the high posterior right frontal parietal region. There is a chronic right posterior watershed infarct. There is a small chronic left cerebellar infarct. A 3 mm chronic lacunar infarct is present in the left posterior parietal corona radiata. There is mild to moderate periventricular white matter hypoattenuation secondary to microvascular disease. There is no acute infarct, mass, hemorrhage or extra-axial collection. There is no hydrocephalus.    A 1 cm sphenoid sinus polyp is reidentified. The remainder of the visualized paranasal sinuses and mastoid air cells are clear. Prior cataract surgery is reidentified.    The osseous structures are intact and unremarkable.    IMPRESSION:    No acute intracranial pathology.    Chronic white matter infarcts in the high right frontoparietal region, right watershed distribution. And left corona radiata. Small chronic left cerebellar infarct.    Mild  microvascular disease.     If symptoms persist, recommend follow-up MRI.                HUSSAIN LINDSEY M.D., ATTENDING RADIOLOGIST  This document has been electronically signed. Jul 5 2020  2:29PM                < end of copied text >      ECHO:    IMPRESSION: 69y Male PAST MEDICAL & SURGICAL HISTORY:  ESRD (end stage renal disease) on dialysis: T-Th-Sat  Stented coronary artery: total 15 stents from [id number]  Hyperthyroidism  H/O: CVA: after cardiac stent placed 12/15/09 -no residual  Heart Attack: 2/1/07 with subsequent stent  Coronary Stent: [id number] 10 stents,  to Ramus 1/2015, cath 01/2016 ( see results in HPI)  Hypercholesterolemia  CAD (Coronary Artery Disease)  DM (Diabetes Mellitus): x 4 yrs without N/N/R  HTN (Hypertension)  S/P CABG x 4  Hemodialysis access, AV graft: 5/2015, L arm  S/P cataract extraction  Boil of Buttock: 2006 and 2008   p/w           IMP: This is a  69 man  with CAD s/p multiple stents, s/p CABG (9/2019 course c/b Afib and LT pleural effusion), Diastolic CHF, HTN, HLD, DM2, Afib on coumadin and ESRD on HD (Tu, Thurs, Sat at [facility]), hyperthyroidism, presented to ER for pain and swelling of left leg since 3 days. Admitted to medicine for LLE cellulitis.  CXR shows gross heart enlargement and mild to moderate left base pleural pulmonary reaction again noted. Sternotomy again seen. Negative for DVT on Doppler study. Blood Cx NGTD. Pt was started on Unasyn and po Doxy, but changed to Vancomycin due to non healing cellulitis. ID consulted.  Improved leg swelling and erythema with Vanco.   Pt. with ESRD on HD, followed by nephro Dr. Calle, HD tolerated. Pt. with significant cardiac hx including AF on Coumadin, followed by cardiology. Coumadin dose adjusted for goal INR 2-3. ECHO resulted EF 40-45% (previous EF 55% in Jan. 2020.  CT chest reveal enlarging left loculated pleural effusion. Pat is not in any resp distress and O2 sat 100 % 2 LPM via NC. He will require chest tube placement but INR is elevated due to coumadin. Thoracic surgery unable to place chest tube, s/p thoracentesis 6/30 .. 150 ml . Post procedure cxr neg for ptx.  IR placed Left chest tube 7/2      Sugg;  -chest tube out put 375 ml/12 hours  -resume therapeutic anticoag with hep and coumadin  -f/u pleural fluid cytology  -dialysis as per neph  -continue O2 via NC as needed  -cxr in AM  -monitor chest tube out put 500 ml /18 hours as per RN  -thoracic f/u noted

## 2020-07-05 NOTE — PROGRESS NOTE ADULT - PROBLEM SELECTOR PLAN 1
-s/p pigtail insertion by IR on 7/2  -cont CT to gravity drainage, mgmt as per ST surgery   -mon daily chest Xray   -Pulm Dr. Delaney   -Thoracic surgery following -s/p pigtail insertion by IR on 7/2  -cont CT to gravity drainage, mgmt as per CT surgery   -mon daily chest Xray   -Pulm Dr. Delaney   -Thoracic surgery following

## 2020-07-05 NOTE — PROGRESS NOTE ADULT - PROBLEM SELECTOR PLAN 2
-periods of confusions at times, likely metabolic encephalopathy   -f/u CT head   -normalize sleep wake cycle, frequent reorientation   -avoid delirium provoking agents   -pt with similar episodes during prior hospitalization as per son, pt may benefit from increased hrs of family presence at bedside

## 2020-07-05 NOTE — PROGRESS NOTE ADULT - PROBLEM SELECTOR PLAN 5
-not in exacerbation  -Echo with HFrEF of 40-45%, moderate global left ventricular systolic dysfunction and decreased RV systolic function with moderate pulmonary hypertension  -negative stress test   -cont ASA, statin and BB   -cardio Dr. Colin

## 2020-07-05 NOTE — PROGRESS NOTE ADULT - SUBJECTIVE AND OBJECTIVE BOX
Patient is a 69y old  Male who presents with a chief complaint of leg pain (05 Jul 2020 11:58)    OVERNIGHT EVENTS: remains with episodes of confusion and delirium       REVIEW OF SYSTEMS:  CONSTITUTIONAL: No fever, chills  RESPIRATORY: No cough, SOB  CARDIOVASCULAR: No chest pain, palpitations  GASTROINTESTINAL: No abdominal pain. No nausea, vomiting, or diarrhea  GENITOURINARY: No dysuria  NEUROLOGICAL: No HA  MUSCULOSKELETAL: No joint pain or swelling; No muscle, back, or extremity pain    T(C): 36.3 (07-05-20 @ 15:30), Max: 36.6 (07-04-20 @ 15:56)  HR: 84 (07-05-20 @ 15:30) (82 - 87)  BP: 109/54 (07-05-20 @ 15:30) (99/72 - 110/68)  RR: 18 (07-05-20 @ 15:30) (17 - 18)  SpO2: 98% (07-05-20 @ 15:30) (90% - 100%)  Wt(kg): --Vital Signs Last 24 Hrs  T(C): 36.3 (05 Jul 2020 15:30), Max: 36.6 (04 Jul 2020 15:56)  T(F): 97.4 (05 Jul 2020 15:30), Max: 97.9 (04 Jul 2020 15:56)  HR: 84 (05 Jul 2020 15:30) (82 - 87)  BP: 109/54 (05 Jul 2020 15:30) (99/72 - 110/68)  BP(mean): --  RR: 18 (05 Jul 2020 15:30) (17 - 18)  SpO2: 98% (05 Jul 2020 15:30) (90% - 100%)    MEDICATIONS  (STANDING):  aMIOdarone    Tablet 200 milliGRAM(s) Oral daily  aspirin enteric coated 81 milliGRAM(s) Oral daily  calcium acetate 667 milliGRAM(s) Oral three times a day with meals  chlorhexidine 2% Cloths 1 Application(s) Topical daily  dextrose 5%. 1000 milliLiter(s) (50 mL/Hr) IV Continuous <Continuous>  heparin  Infusion.  Unit(s)/Hr (15 mL/Hr) IV Continuous <Continuous>  insulin lispro (HumaLOG) corrective regimen sliding scale   SubCutaneous three times a day before meals  insulin lispro (HumaLOG) corrective regimen sliding scale   SubCutaneous at bedtime  ketotifen 0.025% Ophthalmic Solution 1 Drop(s) Both EYES two times a day  methimazole 5 milliGRAM(s) Oral daily  metoprolol tartrate 100 milliGRAM(s) Oral two times a day  pantoprazole    Tablet 40 milliGRAM(s) Oral before breakfast  senna 2 Tablet(s) Oral at bedtime  simvastatin 40 milliGRAM(s) Oral at bedtime    MEDICATIONS  (PRN):  heparin   Injectable 6500 Unit(s) IV Push every 6 hours PRN For aPTT less than 40  heparin   Injectable 3000 Unit(s) IV Push every 6 hours PRN For aPTT between 40 - 57      PHYSICAL EXAM:  GENERAL: NAD  NECK: Supple, No JVD  CHEST/LUNG: Clear to auscultation bilaterally; No rales, rhonchi, wheezing, or rubs, left chest pigtail to gravity drainage   HEART: S1, S2, Regular rate and rhythm  ABDOMEN: Soft, Nontender, Nondistended; Bowel sounds present  NEURO: Alert & Oriented to person and place, disoriented to time and situation, equal strength and sensation, no focal neuro deficit   EXTREMITIES: No LE edema, no calf tenderness    Consultant(s) Notes Reviewed:  [x ] YES  [ ] NO  Care Discussed with Consultants/Other Providers [ x] YES  [ ] NO    LABS:                        11.9   8.08  )-----------( 163      ( 05 Jul 2020 06:40 )             38.8     07-05    130<L>  |  91<L>  |  52<H>  ----------------------------<  164<H>  3.9   |  30  |  7.39<H>    Ca    8.9      05 Jul 2020 06:40      PTT - ( 05 Jul 2020 15:12 )  PTT:61.2 sec    CAPILLARY BLOOD GLUCOSE      POCT Blood Glucose.: 256 mg/dL (05 Jul 2020 11:41)  POCT Blood Glucose.: 136 mg/dL (05 Jul 2020 07:36)  POCT Blood Glucose.: 145 mg/dL (04 Jul 2020 21:02)  POCT Blood Glucose.: 210 mg/dL (04 Jul 2020 16:44)    RADIOLOGY & ADDITIONAL TESTS:    < from: CT Head No Cont (07.05.20 @ 13:20) >    EXAM:  CT BRAIN                            PROCEDURE DATE:  07/05/2020          INTERPRETATION:  CT BRAIN:    CLINICAL HISTORY: 69 years year old Male with confusion. Coronary artery disease, end-stage renal disease, CHF, A. fib and cellulitis.    COMPARISON: Head CT 1/21/2020 and 12/16/2009 and MRI brain 1/21/2020    Helical axial images were obtained from the base of skull to the vertex. No IV contrast was administered.    There is a chronic white matter infarct in the high posterior right frontal parietal region. There is a chronic right posterior watershed infarct. There is a small chronic left cerebellar infarct. A 3 mm chronic lacunar infarct is present in the left posterior parietal corona radiata. There is mild to moderate periventricular white matter hypoattenuation secondary to microvascular disease. There is no acute infarct, mass, hemorrhage or extra-axial collection. There is no hydrocephalus.    A 1 cm sphenoid sinus polyp is reidentified. The remainder of the visualized paranasal sinuses and mastoid air cells are clear. Prior cataract surgery is reidentified.    The osseous structures are intact and unremarkable.    IMPRESSION:    No acute intracranial pathology.    Chronic white matter infarcts in the high right frontoparietal region, right watershed distribution. And left corona radiata. Small chronic left cerebellar infarct.    Mild  microvascular disease.     If symptoms persist, recommend follow-up MRI.      < end of copied text >    < from: Xray Chest 1 View- PORTABLE-Routine (07.04.20 @ 14:42) >    EXAM:  XR CHEST PORTABLE ROUTINE 1V                            PROCEDURE DATE:  07/04/2020          INTERPRETATION:  CLINICAL INDICATION: 69 years  Male with s/p left chest tube placement.    COMPARISON: 7/3/2020    AP view of the chest again demonstrates a pigtail catheter in the left hemithorax.  There is persistent left basilar opacity secondary to effusion and/or infiltrate. There is no pneumothorax. Right lung volume loss is reidentified. There is no right pleural effusion.    There is mild pulmonary vascular congestion.     The heart is markedly enlarged. The mediastinum and ainsley cannot be  assessed due to rotation. Sternotomy wires are reidentified.    A left subclavian and axillary vascular stent is reidentified.    IMPRESSION:    Left pigtail thoracostomy tube. Left effusion and/or infiltrate unchanged.     Mild pulmonary vascular congestion. Cardiomegaly. Sternotomy.        < end of copied text >      Imaging Personally Reviewed:  [ ] YES  [ ] NO

## 2020-07-05 NOTE — PROGRESS NOTE ADULT - SUBJECTIVE AND OBJECTIVE BOX
S: no CP or SOB; pt. appears confused;  Review of systems otherwise (-)    acetaminophen   Tablet .. 650 milliGRAM(s) Oral every 6 hours PRN  acetaminophen  IVPB .. 1000 milliGRAM(s) IV Intermittent once  ALBUTerol    90 MICROgram(s) HFA Inhaler 2 Puff(s) Inhalation every 6 hours PRN  aMIOdarone    Tablet 200 milliGRAM(s) Oral daily  aspirin enteric coated 81 milliGRAM(s) Oral daily  calcium acetate 667 milliGRAM(s) Oral three times a day with meals  chlorhexidine 2% Cloths 1 Application(s) Topical daily  dextrose 5%. 1000 milliLiter(s) IV Continuous <Continuous>  glycopyrrolate 9 MICROgram(s)/formoterol 4.8 MICROgram(s) Inhaler 2 Puff(s) Inhalation two times a day  heparin   Injectable 6500 Unit(s) IV Push every 6 hours PRN  heparin   Injectable 3000 Unit(s) IV Push every 6 hours PRN  heparin  Infusion.  Unit(s)/Hr IV Continuous <Continuous>  insulin lispro (HumaLOG) corrective regimen sliding scale   SubCutaneous three times a day before meals  insulin lispro (HumaLOG) corrective regimen sliding scale   SubCutaneous at bedtime  ketotifen 0.025% Ophthalmic Solution 1 Drop(s) Both EYES two times a day  lidocaine 1% Injectable 20 milliLiter(s) Local Injection once  lidocaine 2% Gel 1 Application(s) Topical every 8 hours  melatonin 5 milliGRAM(s) Oral at bedtime PRN  methimazole 5 milliGRAM(s) Oral daily  metoprolol tartrate 100 milliGRAM(s) Oral two times a day  pantoprazole    Tablet 40 milliGRAM(s) Oral before breakfast  senna 2 Tablet(s) Oral at bedtime  simvastatin 40 milliGRAM(s) Oral at bedtime  tiotropium 18 MICROgram(s) Capsule 1 Capsule(s) Inhalation daily                            11.9   8.08  )-----------( 163      ( 05 Jul 2020 06:40 )             38.8       07-05    130<L>  |  91<L>  |  52<H>  ----------------------------<  164<H>  3.9   |  30  |  7.39<H>    Ca    8.9      05 Jul 2020 06:40              T(C): 36.3 (07-05-20 @ 08:18), Max: 36.7 (07-04-20 @ 12:08)  HR: 84 (07-05-20 @ 08:18) (82 - 98)  BP: 109/84 (07-05-20 @ 08:18) (99/72 - 121/79)  RR: 17 (07-05-20 @ 08:18) (17 - 18)  SpO2: 97% (07-05-20 @ 08:18) (90% - 100%)  Wt(kg): --    I&O's Summary    04 Jul 2020 07:01  -  05 Jul 2020 07:00  --------------------------------------------------------  IN: 0 mL / OUT: 2820 mL / NET: -2820 mL        Gen: Appears well in NAD  HEENT:  (-)icterus (-)pallor  CV: N S1 S2 1/6 DEJAH (+)2 Pulses B/l  Resp:  Clear to ausculatation B/L, decreased at left base normal effort  GI: (+) BS Soft, NT, ND  Lymph:  (+) minimal Edema, (-)obvious lymphadenopathy  Skin: Warm to touch, Normal turgor  Psych: Appropriate mood and affect    TTE: < from: Transthoracic Echocardiogram (06.24.20 @ 07:47) >  TECHNICALLY VERY DIFFICULT STUDY, POOR ACOUSTIC WINDOWS    1. Mitral annular calcification. Mild mitral regurgitation.    2. Increased relative wall thickness with normal left  ventricular (LV) mass index, consistent with concentric LV  remodeling.  3. Endocardium not well visualized; probably moderate  global left ventricular systolic dysfunction based on very  limited views.   Segmental wall motion could not be  assessed.  Septal motion consistent with a conduction  defect.  4. Mild right atrial enlargement.  5. Right ventricular enlargement with decreased RV systolic  function.  6. RV systolic pressure is 54 mm Hg. Moderate pulmonary  hypertension.    < end of copied text >        ASSESSMENT/PLAN: 69y Male  CAD s/p multiple stents, s/p CABG (9/2019 course c/b Afib and LT pleural effusion), Normal LV systolic function, severe Diastolic, RV dysfx CHF, HTN, HLD, DM2, Afib on coumadin and ESRD on HD (Tu, Thurs, Sat at [facility]), hyperthyroidism, presented to ER for pain and swelling of left leg since 3 days.    - LE dopplers negative for dVT   - s/p thoracentesis  - tolerated procedure well in terms of cv perspective   - AC resumed with hep gtt for Afib  - continue with medical management of known CAD with stents with sapt   - given ongoing confusion, would check CTH  - workup of AMS per primary team  - discussed above with covering NP    Kalie Lau MD

## 2020-07-05 NOTE — CHART NOTE - NSCHARTNOTEFT_GEN_A_CORE
EVENT:   - 69M with CAD s/p multiple stents, s/p CABG (9/2019 course c/b Afib and LT pleural effusion), Diastolic CHF, HTN, HLD, DM2, Afib on coumadin and ESRD on HD (Tu, Thurs, Sat at [facility]), hyperthyroidism, presented to ER for pain and swelling of left leg. Admitted to medicine for LLE cellulitis. CT chest found to have large L loculated pleural effusion,s/p thoracentesis draining 150cc of blood tinged pleural fluid. Thoracic  surgery following, s/p chest tube insertion on 7/2. Repeat chest xray shows improvement of pleural effusion, Heparin drip resumed. nephro Dr. Calle following. CT remains with increased output. hospital course complicated with worsening delirium, CT head done and showed no acute pathology.     - Received phone call from RN that pt is c/o of L arm pain which he rated as a 6/10. denied any other symptoms.    OBJECTIVE:  Vital Signs Last 24 Hrs  T(C): 36.3 (05 Jul 2020 15:30), Max: 36.6 (05 Jul 2020 00:06)  T(F): 97.4 (05 Jul 2020 15:30), Max: 97.8 (05 Jul 2020 00:06)  HR: 84 (05 Jul 2020 15:30) (82 - 85)  BP: 109/54 (05 Jul 2020 15:30) (99/72 - 109/84)  BP(mean): --  RR: 18 (05 Jul 2020 15:30) (17 - 18)  SpO2: 98% (05 Jul 2020 15:30) (90% - 98%)    FOCUSED PHYSICAL EXAM:  Neuro: awake, alert, oriented x 3. No neuro deficit  Cardiovascular: Pulses +2 B/L in lower and upper extremities, HR regular, BP stable, No edema.  Respiratory: Respirations regular, unlabored, breath sounds clear B/L.   GI: Abdomen soft, non-tender, positive bowel sounds.  : no bladder distention noted. No complaints at this time.  Skin: Dry, intact, no bruising, no diaphoresis.    LABS:                        11.9   8.08  )-----------( 163      ( 05 Jul 2020 06:40 )             38.8     07-05    130<L>  |  91<L>  |  52<H>  ----------------------------<  164<H>  3.9   |  30  |  7.39<H>    Ca    8.9      05 Jul 2020 06:40        Assessment/Problem left arm pain    PLAN:   1. Tylenol 650mg, PO x 1 dose ordered  2. Cont present care

## 2020-07-05 NOTE — PROGRESS NOTE ADULT - ASSESSMENT
69M with CAD s/p multiple stents, s/p CABG (9/2019 course c/b Afib and LT pleural effusion), Diastolic CHF, HTN, HLD, DM2, Afib on coumadin and ESRD on HD (Tu, Thurs, Sat at [facility]), hyperthyroidism, presented to ER for pain and swelling of left leg. Admitted to medicine for LLE cellulitis. CT chest found to have large L loculated pleural effusion,s/p thoracentesis draining 150cc of blood tinged pleural fluid. Thoracic  surgery following, s/p chest tube insertion on 7/2. Repeat chest xray shows improvement of pleural effusion, Heparin drip resumed. nephro Dr. Calle following. CT remains with increased output. hospital course complicated with worsening delirium, CT head done and showed no acute pathology.     CT noted above  Pigtail to be placed by IR 7/2  Pt satting well on supplemental O2  CXR shows improvement  Dr. Delaney (Pulmonary) following  CT surgery following    Pt takes methimazole at home  Continue with home regimen    Not in exacerbation   EF 40-45%  Moderate global left ventricular systolic dysfunction, decreased RV systolic function.   Moderate pulmonary hypertension  Stress with fixed defect, no new perfusion abnormalities.   Dr. Colin (Cardiology) following 69M with CAD s/p multiple stents, s/p CABG (9/2019 course c/b Afib and LT pleural effusion), Diastolic CHF, HTN, HLD, DM2, Afib on coumadin and ESRD on HD (Tu, Thurs, Sat at [facility]), hyperthyroidism, presented to ER for pain and swelling of left leg. Admitted to medicine for LLE cellulitis. CT chest found to have large L loculated pleural effusion,s/p thoracentesis draining 150cc of blood tinged pleural fluid. Thoracic  surgery following, s/p chest tube insertion on 7/2. Repeat chest xray shows improvement of pleural effusion, Heparin drip resumed. nephro Dr. Calle following. CT remains with increased output. hospital course complicated with worsening delirium, CT head done and showed no acute pathology.

## 2020-07-05 NOTE — PROGRESS NOTE ADULT - SUBJECTIVE AND OBJECTIVE BOX
INTERVAL HPI/OVERNIGHT EVENTS:    Pt seen and examined at bedside. No acute complaints at this time.     Vital Signs Last 24 Hrs  T(C): 36.3 (05 Jul 2020 08:18), Max: 36.7 (04 Jul 2020 12:08)  T(F): 97.4 (05 Jul 2020 08:18), Max: 98.1 (04 Jul 2020 12:08)  HR: 84 (05 Jul 2020 08:18) (82 - 98)  BP: 109/84 (05 Jul 2020 08:18) (99/72 - 121/79)  BP(mean): --  RR: 17 (05 Jul 2020 08:18) (17 - 18)  SpO2: 97% (05 Jul 2020 08:18) (90% - 100%)  I&O's Detail    04 Jul 2020 07:01  -  05 Jul 2020 07:00  --------------------------------------------------------  IN:  Total IN: 0 mL    OUT:    Chest Tube: 820 mL    Other: 2000 mL  Total OUT: 2820 mL    Total NET: -2820 mL        lidocaine 1% Injectable 20 milliLiter(s) Local Injection once  pantoprazole    Tablet 40 milliGRAM(s) Oral before breakfast  senna 2 Tablet(s) Oral at bedtime      Physical Exam  General: No acute distress  Chest: left pigtail in place, to water seal, -AL, output ss, dressing c/d/i, no crepitus       Labs:                        11.9   8.08  )-----------( 163      ( 05 Jul 2020 06:40 )             38.8     07-05    130<L>  |  91<L>  |  52<H>  ----------------------------<  164<H>  3.9   |  30  |  7.39<H>    Ca    8.9      05 Jul 2020 06:40      PTT - ( 05 Jul 2020 06:40 )  PTT:>200.0 sec

## 2020-07-05 NOTE — PROGRESS NOTE ADULT - ASSESSMENT
s/p IR L pigtail placement on 7/2 for L pleural effusion.     1. F/u AM CXR   2. Monitor pigtail output  3. Will follow

## 2020-07-06 LAB
ANION GAP SERPL CALC-SCNC: 14 MMOL/L — SIGNIFICANT CHANGE UP (ref 5–17)
APTT BLD: 34.8 SEC — SIGNIFICANT CHANGE UP (ref 27.5–35.5)
APTT BLD: 61.3 SEC — HIGH (ref 27.5–35.5)
BUN SERPL-MCNC: 64 MG/DL — HIGH (ref 7–18)
CALCIUM SERPL-MCNC: 9 MG/DL — SIGNIFICANT CHANGE UP (ref 8.4–10.5)
CHLORIDE SERPL-SCNC: 92 MMOL/L — LOW (ref 96–108)
CO2 SERPL-SCNC: 25 MMOL/L — SIGNIFICANT CHANGE UP (ref 22–31)
CREAT SERPL-MCNC: 8.66 MG/DL — HIGH (ref 0.5–1.3)
GLUCOSE BLDC GLUCOMTR-MCNC: 124 MG/DL — HIGH (ref 70–99)
GLUCOSE BLDC GLUCOMTR-MCNC: 136 MG/DL — HIGH (ref 70–99)
GLUCOSE BLDC GLUCOMTR-MCNC: 156 MG/DL — HIGH (ref 70–99)
GLUCOSE BLDC GLUCOMTR-MCNC: 204 MG/DL — HIGH (ref 70–99)
GLUCOSE SERPL-MCNC: 113 MG/DL — HIGH (ref 70–99)
HCT VFR BLD CALC: 39.8 % — SIGNIFICANT CHANGE UP (ref 39–50)
HGB BLD-MCNC: 12.4 G/DL — LOW (ref 13–17)
INR BLD: 1.27 RATIO — HIGH (ref 0.88–1.16)
MCHC RBC-ENTMCNC: 28.9 PG — SIGNIFICANT CHANGE UP (ref 27–34)
MCHC RBC-ENTMCNC: 31.2 GM/DL — LOW (ref 32–36)
MCV RBC AUTO: 92.8 FL — SIGNIFICANT CHANGE UP (ref 80–100)
NON-GYNECOLOGICAL CYTOLOGY STUDY: SIGNIFICANT CHANGE UP
NRBC # BLD: 0 /100 WBCS — SIGNIFICANT CHANGE UP (ref 0–0)
PLATELET # BLD AUTO: 169 K/UL — SIGNIFICANT CHANGE UP (ref 150–400)
POTASSIUM SERPL-MCNC: 4.1 MMOL/L — SIGNIFICANT CHANGE UP (ref 3.5–5.3)
POTASSIUM SERPL-SCNC: 4.1 MMOL/L — SIGNIFICANT CHANGE UP (ref 3.5–5.3)
PROTHROM AB SERPL-ACNC: 14.7 SEC — HIGH (ref 10.6–13.6)
RBC # BLD: 4.29 M/UL — SIGNIFICANT CHANGE UP (ref 4.2–5.8)
RBC # FLD: 15.9 % — HIGH (ref 10.3–14.5)
SODIUM SERPL-SCNC: 131 MMOL/L — LOW (ref 135–145)
WBC # BLD: 8.74 K/UL — SIGNIFICANT CHANGE UP (ref 3.8–10.5)
WBC # FLD AUTO: 8.74 K/UL — SIGNIFICANT CHANGE UP (ref 3.8–10.5)

## 2020-07-06 PROCEDURE — 71045 X-RAY EXAM CHEST 1 VIEW: CPT | Mod: 26

## 2020-07-06 PROCEDURE — 71250 CT THORAX DX C-: CPT | Mod: 26

## 2020-07-06 RX ORDER — ALTEPLASE 100 MG
10 KIT INTRAVENOUS ONCE
Refills: 0 | Status: DISCONTINUED | OUTPATIENT
Start: 2020-07-06 | End: 2020-07-06

## 2020-07-06 RX ORDER — WARFARIN SODIUM 2.5 MG/1
5 TABLET ORAL ONCE
Refills: 0 | Status: DISCONTINUED | OUTPATIENT
Start: 2020-07-06 | End: 2020-07-06

## 2020-07-06 RX ORDER — DORNASE ALFA 1 MG/ML
5 SOLUTION RESPIRATORY (INHALATION) ONCE
Refills: 0 | Status: DISCONTINUED | OUTPATIENT
Start: 2020-07-06 | End: 2020-07-15

## 2020-07-06 RX ADMIN — HEPARIN SODIUM 1100 UNIT(S)/HR: 5000 INJECTION INTRAVENOUS; SUBCUTANEOUS at 09:01

## 2020-07-06 RX ADMIN — Medication 667 MILLIGRAM(S): at 11:48

## 2020-07-06 RX ADMIN — Medication 2: at 11:46

## 2020-07-06 RX ADMIN — Medication 667 MILLIGRAM(S): at 16:46

## 2020-07-06 RX ADMIN — Medication 100 MILLIGRAM(S): at 06:03

## 2020-07-06 RX ADMIN — Medication 81 MILLIGRAM(S): at 11:48

## 2020-07-06 RX ADMIN — PANTOPRAZOLE SODIUM 40 MILLIGRAM(S): 20 TABLET, DELAYED RELEASE ORAL at 06:03

## 2020-07-06 RX ADMIN — KETOTIFEN FUMARATE 1 DROP(S): 0.34 SOLUTION OPHTHALMIC at 06:04

## 2020-07-06 RX ADMIN — CHLORHEXIDINE GLUCONATE 1 APPLICATION(S): 213 SOLUTION TOPICAL at 11:51

## 2020-07-06 RX ADMIN — Medication 1: at 16:44

## 2020-07-06 RX ADMIN — AMIODARONE HYDROCHLORIDE 200 MILLIGRAM(S): 400 TABLET ORAL at 06:03

## 2020-07-06 RX ADMIN — Medication 667 MILLIGRAM(S): at 08:09

## 2020-07-06 RX ADMIN — SENNA PLUS 2 TABLET(S): 8.6 TABLET ORAL at 21:12

## 2020-07-06 RX ADMIN — HEPARIN SODIUM 700 UNIT(S)/HR: 5000 INJECTION INTRAVENOUS; SUBCUTANEOUS at 00:10

## 2020-07-06 RX ADMIN — KETOTIFEN FUMARATE 1 DROP(S): 0.34 SOLUTION OPHTHALMIC at 17:26

## 2020-07-06 RX ADMIN — SIMVASTATIN 40 MILLIGRAM(S): 20 TABLET, FILM COATED ORAL at 21:12

## 2020-07-06 NOTE — PROGRESS NOTE ADULT - PROBLEM SELECTOR PLAN 2
-periods of confusions at times, metabolic vs hospital delirium   -CT head with chronic changes   -normalize sleep wake cycle, frequent reorientation   -avoid delirium provoking agents   -pt with similar episodes during prior hospitalization as per son, pt may benefit from increased hrs of family presence at bedside

## 2020-07-06 NOTE — PROGRESS NOTE ADULT - SUBJECTIVE AND OBJECTIVE BOX
Chief Complaint   Patient presents with    Cough     with some congestion, started Sunday     Visit Vitals  /62 (BP 1 Location: Left arm, BP Patient Position: Sitting)   Pulse (!) 41   Temp 98.4 °F (36.9 °C) (Oral)   Resp 18   Ht 5' 6\" (1.676 m)   Wt 178 lb 9.6 oz (81 kg)   SpO2 96%   BMI 28.83 kg/m²     1. Have you been to the ER, urgent care clinic since your last visit? Hospitalized since your last visit? No    2. Have you seen or consulted any other health care providers outside of the [address] [place] [address] since your last visit? Include any pap smears or colon screening. No    Reviewed record in preparation for visit and have necessary documentation  Pt did not bring medication to office visit for review  opportunity was given for questions  Goals that were addressed and/or need to be completed during or after this appointment include   There are no preventive care reminders to display for this patient. Time of Visit:  Patient seen and examined.     MEDICATIONS  (STANDING):  aMIOdarone    Tablet 200 milliGRAM(s) Oral daily  aspirin enteric coated 81 milliGRAM(s) Oral daily  calcium acetate 667 milliGRAM(s) Oral three times a day with meals  chlorhexidine 2% Cloths 1 Application(s) Topical daily  dextrose 5%. 1000 milliLiter(s) (50 mL/Hr) IV Continuous <Continuous>  dornase danilo Solution for Pleural Effusion 5 milliGRAM(s) IntraPleural. once  insulin lispro (HumaLOG) corrective regimen sliding scale   SubCutaneous three times a day before meals  insulin lispro (HumaLOG) corrective regimen sliding scale   SubCutaneous at bedtime  ketotifen 0.025% Ophthalmic Solution 1 Drop(s) Both EYES two times a day  methimazole 5 milliGRAM(s) Oral daily  metoprolol tartrate 100 milliGRAM(s) Oral two times a day  pantoprazole    Tablet 40 milliGRAM(s) Oral before breakfast  senna 2 Tablet(s) Oral at bedtime  simvastatin 40 milliGRAM(s) Oral at bedtime      MEDICATIONS  (PRN):       Medications up to date at time of exam.      PHYSICAL EXAMINATION:  Patient has no new complaints.  GENERAL: The patient is a well-developed, well-nourished, in no apparent distress.     Vital Signs Last 24 Hrs  T(C): 36.8 (06 Jul 2020 16:04), Max: 36.8 (06 Jul 2020 07:44)  T(F): 98.2 (06 Jul 2020 16:04), Max: 98.2 (06 Jul 2020 07:44)  HR: 62 (06 Jul 2020 16:04) (62 - 86)  BP: 101/56 (06 Jul 2020 16:04) (101/56 - 132/66)  BP(mean): --  RR: 16 (06 Jul 2020 16:04) (16 - 20)  SpO2: 95% (06 Jul 2020 16:04) (95% - 100%)   (if applicable)    Chest Tube (if applicable)    HEENT: Head is normocephalic and atraumatic. Extraocular muscles are intact. Mucous membranes are moist.     NECK: Supple, no palpable adenopathy.    LUNGS: Clear to auscultation, no wheezing, rales, or rhonchi.    HEART: Regular rate and rhythm without murmur.    ABDOMEN: Soft, nontender, and nondistended.  No hepatosplenomegaly is noted.    : No painful voiding, no pelvic pain    EXTREMITIES: Without any cyanosis, clubbing, rash, lesions or edema.    NEUROLOGIC: Awake, alert, oriented, grossly intact    SKIN: Warm, dry, good turgor.      LABS:                        12.4   8.74  )-----------( 169      ( 06 Jul 2020 06:33 )             39.8     07-06    131<L>  |  92<L>  |  64<H>  ----------------------------<  113<H>  4.1   |  25  |  8.66<H>    Ca    9.0      06 Jul 2020 06:33      PT/INR - ( 06 Jul 2020 06:33 )   PT: 14.7 sec;   INR: 1.27 ratio         PTT - ( 06 Jul 2020 15:03 )  PTT:61.3 sec                    MICROBIOLOGY: (if applicable)    RADIOLOGY & ADDITIONAL STUDIES:  EKG:   CXR:  ECHO:    IMPRESSION: 69y Male PAST MEDICAL & SURGICAL HISTORY:  ESRD (end stage renal disease) on dialysis: T-Th-Sat  Stented coronary artery: total 15 stents from [id number]  Hyperthyroidism  H/O: CVA: after cardiac stent placed 12/15/09 -no residual  Heart Attack: 2/1/07 with subsequent stent  Coronary Stent: [id number] 10 stents,  to Ramus 1/2015, cath 01/2016 ( see results in HPI)  Hypercholesterolemia  CAD (Coronary Artery Disease)  DM (Diabetes Mellitus): x 4 yrs without N/N/R  HTN (Hypertension)  S/P CABG x 4  Hemodialysis access, AV graft: 5/2015, L arm  S/P cataract extraction  Boil of Buttock: 2006 and 2008   p/w           IMP: This is a  69 man  with CAD s/p multiple stents, s/p CABG (9/2019 course c/b Afib and LT pleural effusion), Diastolic CHF, HTN, HLD, DM2, Afib on coumadin and ESRD on HD (Tu, Thurs, Sat at [facility]), hyperthyroidism, presented to ER for pain and swelling of left leg since 3 days. Admitted to medicine for LLE cellulitis.  CXR shows gross heart enlargement and mild to moderate left base pleural pulmonary reaction again noted. Sternotomy again seen. Negative for DVT on Doppler study. Blood Cx NGTD. Pt was started on Unasyn and po Doxy, but changed to Vancomycin due to non healing cellulitis. ID consulted.  Improved leg swelling and erythema with Vanco.   Pt. with ESRD on HD, followed by nephro Dr. Calle, HD tolerated. Pt. with significant cardiac hx including AF on Coumadin, followed by cardiology. Coumadin dose adjusted for goal INR 2-3. ECHO resulted EF 40-45% (previous EF 55% in Jan. 2020.  CT chest reveal enlarging left loculated pleural effusion. Pat is not in any resp distress and O2 sat 100 % 2 LPM via NC. He will require chest tube placement but INR is elevated due to coumadin. Thoracic surgery unable to place chest tube, s/p thoracentesis 6/30 .. 150 ml . Post procedure cxr neg for ptx.  IR placed Left chest tube 7/2. repeated cxr, no improvement .       Sugg;  -thoracic f/u .. for TPA and dornase  -f/u pleural fluid cytology  -dialysis as per neph  -continue O2 via NC as needed  -cxr in AM  -monitor chest tube out put 500 ml /18 hours as per RN

## 2020-07-06 NOTE — PROGRESS NOTE ADULT - SUBJECTIVE AND OBJECTIVE BOX
Patient is a 69y old  Male who presents with a chief complaint of leg pain (05 Jul 2020 11:58)    OVERNIGHT EVENTS: no acute events overnight     REVIEW OF SYSTEMS:  CONSTITUTIONAL: No fever, chills  RESPIRATORY: No cough, SOB  CARDIOVASCULAR: No chest pain, palpitations  GASTROINTESTINAL: No abdominal pain. No nausea, vomiting, or diarrhea  GENITOURINARY: No dysuria  NEUROLOGICAL: No HA  MUSCULOSKELETAL: No joint pain or swelling; No muscle, back, or extremity pain    T(C): 36.8 (07-06-20 @ 07:44), Max: 36.8 (07-06-20 @ 07:44)  HR: 86 (07-06-20 @ 07:44) (76 - 86)  BP: 113/51 (07-06-20 @ 07:44) (107/46 - 132/66)  RR: 20 (07-06-20 @ 07:44) (18 - 20)  SpO2: 100% (07-06-20 @ 07:44) (98% - 100%)  Wt(kg): --Vital Signs Last 24 Hrs  T(C): 36.8 (06 Jul 2020 07:44), Max: 36.8 (06 Jul 2020 07:44)  T(F): 98.2 (06 Jul 2020 07:44), Max: 98.2 (06 Jul 2020 07:44)  HR: 86 (06 Jul 2020 07:44) (76 - 86)  BP: 113/51 (06 Jul 2020 07:44) (107/46 - 132/66)  BP(mean): --  RR: 20 (06 Jul 2020 07:44) (18 - 20)  SpO2: 100% (06 Jul 2020 07:44) (98% - 100%)    MEDICATIONS  (STANDING):  aMIOdarone    Tablet 200 milliGRAM(s) Oral daily  aspirin enteric coated 81 milliGRAM(s) Oral daily  calcium acetate 667 milliGRAM(s) Oral three times a day with meals  chlorhexidine 2% Cloths 1 Application(s) Topical daily  dextrose 5%. 1000 milliLiter(s) (50 mL/Hr) IV Continuous <Continuous>  heparin  Infusion.  Unit(s)/Hr (15 mL/Hr) IV Continuous <Continuous>  insulin lispro (HumaLOG) corrective regimen sliding scale   SubCutaneous three times a day before meals  insulin lispro (HumaLOG) corrective regimen sliding scale   SubCutaneous at bedtime  ketotifen 0.025% Ophthalmic Solution 1 Drop(s) Both EYES two times a day  methimazole 5 milliGRAM(s) Oral daily  metoprolol tartrate 100 milliGRAM(s) Oral two times a day  pantoprazole    Tablet 40 milliGRAM(s) Oral before breakfast  senna 2 Tablet(s) Oral at bedtime  simvastatin 40 milliGRAM(s) Oral at bedtime    MEDICATIONS  (PRN):  heparin   Injectable 6500 Unit(s) IV Push every 6 hours PRN For aPTT less than 40  heparin   Injectable 3000 Unit(s) IV Push every 6 hours PRN For aPTT between 40 - 57    PHYSICAL EXAM:  GENERAL: NAD  NECK: Supple, No JVD  CHEST/LUNG: Clear to diminished bilaterally; No rales, rhonchi, wheezing, or rubs, left chest pigtail to gravity drainage with serosanguinous drainage   HEART: S1, S2, Regular rate and rhythm  ABDOMEN: Soft, Nontender, Nondistended; Bowel sounds present  NEURO: Alert & Oriented to person and place, disoriented to time and situation at times, equal strength and sensation, no focal neuro deficit   EXTREMITIES: No LE edema, no calf tenderness    Consultant(s) Notes Reviewed:  [x ] YES  [ ] NO  Care Discussed with Consultants/Other Providers [ x] YES  [ ] NO    LABS:                        12.4   8.74  )-----------( 169      ( 06 Jul 2020 06:33 )             39.8     07-06    131<L>  |  92<L>  |  64<H>  ----------------------------<  113<H>  4.1   |  25  |  8.66<H>    Ca    9.0      06 Jul 2020 06:33      PT/INR - ( 06 Jul 2020 06:33 )   PT: 14.7 sec;   INR: 1.27 ratio         PTT - ( 06 Jul 2020 06:33 )  PTT:34.8 sec    CAPILLARY BLOOD GLUCOSE      POCT Blood Glucose.: 124 mg/dL (06 Jul 2020 08:10)  POCT Blood Glucose.: 176 mg/dL (05 Jul 2020 21:04)  POCT Blood Glucose.: 93 mg/dL (05 Jul 2020 16:26)  POCT Blood Glucose.: 256 mg/dL (05 Jul 2020 11:41)    RADIOLOGY & ADDITIONAL TESTS:    < from: CT Head No Cont (07.05.20 @ 13:20) >    EXAM:  CT BRAIN                            PROCEDURE DATE:  07/05/2020          INTERPRETATION:  CT BRAIN:    CLINICAL HISTORY: 69 years year old Male with confusion. Coronary artery disease, end-stage renal disease, CHF, A. fib and cellulitis.    COMPARISON: Head CT 1/21/2020 and 12/16/2009 and MRI brain 1/21/2020    Helical axial images were obtained from the base of skull to the vertex. No IV contrast was administered.    There is a chronic white matter infarct in the high posterior right frontal parietal region. There is a chronic right posterior watershed infarct. There is a small chronic left cerebellar infarct. A 3 mm chronic lacunar infarct is present in the left posterior parietal corona radiata. There is mild to moderate periventricular white matter hypoattenuation secondary to microvascular disease. There is no acute infarct, mass, hemorrhage or extra-axial collection. There is no hydrocephalus.    A 1 cm sphenoid sinus polyp is reidentified. The remainder of the visualized paranasal sinuses and mastoid air cells are clear. Prior cataract surgery is reidentified.    The osseous structures are intact and unremarkable.    IMPRESSION:    No acute intracranial pathology.    Chronic white matter infarcts in the high right frontoparietal region, right watershed distribution. And left corona radiata. Small chronic left cerebellar infarct.    Mild  microvascular disease.     If symptoms persist, recommend follow-up MRI.            < end of copied text >      Imaging Personally Reviewed:  [ ] YES  [ ] NO

## 2020-07-06 NOTE — PROGRESS NOTE ADULT - SUBJECTIVE AND OBJECTIVE BOX
Nephrology Followup Note - [phone] - Dr Mendiola / Dr Knight / Dr Macias / Dr Arroyo / Dr Mackey / Dr Bowen / Dr Esposito / Dr Calle  Pt seen and examined at bedside  No acute events overnight. No complaints.     Allergies:  lisinopril (Other)  opioid-like analgesics (Other)    Hospital Medications:   MEDICATIONS  (STANDING):  aMIOdarone    Tablet 200 milliGRAM(s) Oral daily  aspirin enteric coated 81 milliGRAM(s) Oral daily  calcium acetate 667 milliGRAM(s) Oral three times a day with meals  chlorhexidine 2% Cloths 1 Application(s) Topical daily  dextrose 5%. 1000 milliLiter(s) (50 mL/Hr) IV Continuous <Continuous>  dornase danilo Solution for Pleural Effusion 5 milliGRAM(s) IntraPleural. once  heparin  Infusion.  Unit(s)/Hr (15 mL/Hr) IV Continuous <Continuous>  insulin lispro (HumaLOG) corrective regimen sliding scale   SubCutaneous three times a day before meals  insulin lispro (HumaLOG) corrective regimen sliding scale   SubCutaneous at bedtime  ketotifen 0.025% Ophthalmic Solution 1 Drop(s) Both EYES two times a day  methimazole 5 milliGRAM(s) Oral daily  metoprolol tartrate 100 milliGRAM(s) Oral two times a day  pantoprazole    Tablet 40 milliGRAM(s) Oral before breakfast  senna 2 Tablet(s) Oral at bedtime  simvastatin 40 milliGRAM(s) Oral at bedtime      VITALS:  T(F): 98.2 (07-06-20 @ 07:44), Max: 98.2 (07-06-20 @ 07:44)  HR: 86 (07-06-20 @ 07:44)  BP: 113/51 (07-06-20 @ 07:44)  RR: 20 (07-06-20 @ 07:44)  SpO2: 100% (07-06-20 @ 07:44)  Wt(kg): --    07-05 @ 07:01  -  07-06 @ 07:00  --------------------------------------------------------  IN: 0 mL / OUT: 335 mL / NET: -335 mL        PHYSICAL EXAM:  Constitutional: NAD  HEENT: anicteric sclera, oropharynx clear, MMM  Neck: No JVD  Respiratory: CTAB, no wheezes, rales or rhonchi  Cardiovascular: S1, S2, RRR  Gastrointestinal: BS+, soft, NT/ND  Extremities: No cyanosis or clubbing. LUE peripheral edema  Neurological: A/O x 2, no focal deficits  Psychiatric: Normal mood, normal affect  : No CVA tenderness. No wagner.   Skin: No rashes  Vascular Access: LUE AV access +thrill and bruit.     LABS:  07-06    131<L>  |  92<L>  |  64<H>  ----------------------------<  113<H>  4.1   |  25  |  8.66<H>    Ca    9.0      06 Jul 2020 06:33      Creatinine Trend: 8.66 <--, 7.39 <--, 9.27 <--, 8.17 <--, 10.70 <--, 9.21 <--, 11.90 <--                        12.4   8.74  )-----------( 169      ( 06 Jul 2020 06:33 )             39.8     Urine Studies:      RADIOLOGY & ADDITIONAL STUDIES:

## 2020-07-06 NOTE — CHART NOTE - NSCHARTNOTEFT_GEN_A_CORE
Pt pigtail with decreased flow, clogged vs. no fluid to drain; CT chest taken today reviewed per IR/thoracic surgeon, effusion decreased.   Planning to administer small dose tPA (0.4mg) and dornase tomorrow, and if still no output will plan to discontinue tube on the same day or the day after. To avoid risk of bleeding, hep gtt and warfarin to be held from tonight -orders discontinued after communicated with covering NP, Shan. Plans discussed with Dr. Torres. Pt pigtail with decreased flow, clogged vs. no fluid to drain; IR assessed and recommended discontinuing pigtail for there are not much collection to drain vs. alteplase.  CT chest taken today reviewed per IR attnding/thoracic surgeon, notably effusion decreased.   Planning to administer small dose tPA (0.4mg) and dornase tomorrow, and if still no output will plan to discontinue tube on the same day or the day after. To avoid risk of bleeding, hep gtt and warfarin to be held from tonight -orders discontinued after communicated with covering NP, Shan. Plans discussed with Dr. Torres.

## 2020-07-06 NOTE — PROGRESS NOTE ADULT - ASSESSMENT
Patient is a 69y Male whom presented to the hospital with LT leg pain and swelling.  Was sent from cardiology clinic where he had gone for routine evaluation as was noted  to have RT leg swelling, to R/O DVT. ( doppler US negative for DVT).admitted for treatment of leg cellulitis   Renal consulted for ESRD. Large left loculated pleural effusion s/p L pigtail catheter.     A/P  #ESRD Plan for HD tomorrow. Otherwise electrolytes acceptable. LUE swelling noted, no issues cannulating AV access.   # hyponatremia . In a patient with hypervolemia and lung pathology.  fluid restriction 1.2 litres/day.  RPT BMP daily   # Anemia of CKD. stable off ROSARIO  # lytes and acid base at goal  on mx for bilateral leg cellulitis, Neg DVT.

## 2020-07-06 NOTE — PROGRESS NOTE ADULT - SUBJECTIVE AND OBJECTIVE BOX
INTERVAL HPI/OVERNIGHT EVENTS:  No acute events overnight. Pt resting comfortably. Saturating at 100% on 2L NC.    MEDICATIONS  (STANDING):  aMIOdarone    Tablet 200 milliGRAM(s) Oral daily  aspirin enteric coated 81 milliGRAM(s) Oral daily  calcium acetate 667 milliGRAM(s) Oral three times a day with meals  chlorhexidine 2% Cloths 1 Application(s) Topical daily  dextrose 5%. 1000 milliLiter(s) (50 mL/Hr) IV Continuous <Continuous>  heparin  Infusion.  Unit(s)/Hr (15 mL/Hr) IV Continuous <Continuous>  insulin lispro (HumaLOG) corrective regimen sliding scale   SubCutaneous three times a day before meals  insulin lispro (HumaLOG) corrective regimen sliding scale   SubCutaneous at bedtime  ketotifen 0.025% Ophthalmic Solution 1 Drop(s) Both EYES two times a day  methimazole 5 milliGRAM(s) Oral daily  metoprolol tartrate 100 milliGRAM(s) Oral two times a day  pantoprazole    Tablet 40 milliGRAM(s) Oral before breakfast  senna 2 Tablet(s) Oral at bedtime  simvastatin 40 milliGRAM(s) Oral at bedtime    MEDICATIONS  (PRN):  heparin   Injectable 6500 Unit(s) IV Push every 6 hours PRN For aPTT less than 40  heparin   Injectable 3000 Unit(s) IV Push every 6 hours PRN For aPTT between 40 - 57    Vital Signs Last 24 Hrs  T(C): 36.8 (06 Jul 2020 07:44), Max: 36.8 (06 Jul 2020 07:44)  T(F): 98.2 (06 Jul 2020 07:44), Max: 98.2 (06 Jul 2020 07:44)  HR: 86 (06 Jul 2020 07:44) (76 - 86)  BP: 113/51 (06 Jul 2020 07:44) (107/46 - 132/66)  RR: 20 (06 Jul 2020 07:44) (18 - 20)  SpO2: 100% (06 Jul 2020 07:44) (98% - 100%)    Physical:  General: Alert and oriented, not in acute distress  Resp: Breathing unlabored  Chest: left pigtail in place in left upper back, draining serous fluid; pigtail to water seal, no air leak, no surrounding crepitus   Extremities: No pedal edema    I&O's Detail  05 Jul 2020 07:01  -  06 Jul 2020 07:00  --------------------------------------------------------  IN:  Total IN: 0 mL    OUT:    Chest Tube: 335 mL  Total OUT: 335 mL  Total NET: -335 mL    LABS:                     12.4   8.74  )-----------( 169      ( 06 Jul 2020 06:33 )             39.8             07-06    131<L>  |  92<L>  |  64<H>  ----------------------------<  113<H>  4.1   |  25  |  8.66<H>    Ca    9.0      06 Jul 2020 06:33

## 2020-07-06 NOTE — PROGRESS NOTE ADULT - ATTENDING COMMENTS
HPI:  69M with CAD s/p multiple stents, s/p CABG (9/2019 course c/b Afib and LT pleural effusion), Diastolic CHF, HTN, HLD, DM2, Afib on coumadin and ESRD on HD (Tu, Thurs, Sat at [facility]), hyperthyroidism, presented to ER for pain and swelling of left leg since 3 days. Patient reports sustaining injury to posterior aspect of left leg 1 month ago when he accidentally hit his leg against his bed. He reports the wound has not healed since. Reports he developed pain and swelling of left leg since 3 days, 9/10 in severity, with difficulty ambulating. Reports he was advised to get doppler which he reviewed with [facility] cardiologist today, was told he has no clot and recommended to come to ER. Denies fever, chills, weakness, cough, dysuria, NVDC. (18 Jun 2020 19:29)      - AMS DUE TO  METABOLIC ENCEPHALOPATHY AND VASCULAR DEMENTIA  - AVOID DELIRIOGENIC AGENTS, FREQUENT RE-ORIENTATION. CT HEAD IS DONE ON 07-, NO ACUTE CHANGES , BUT CHRONIC LACUNAR INFARCTS. D/W PATIENT'S SON AND HIS CARDIOLOGIST     -  CELLULITIS OF LEFT LEG, DIABETIC LEFT FOOT ULCER  - IMPROVED ON VANCOMYCIN [PER ID RECOMMENDATION,  BLOOD  CXS NGTD. ID F/UP IS IN PROGRESS  BY DR. DUEÑAS    - LOCULATED LEFT SIDED PLEURAL EFFUSION S/P IR PLACED PIG TAIL CATHETER ON  07-, STILL IS DRAINING . RPT CT CHEST SHOWS IMPROVEMENT BUT PERSISTENT EFFUSION.  - PULMONOLOGY CONSULT & THORACIC SURGERY CONSULT IN PROGRESS. PLAN FOR TPA VIA CHEST TUBE PER THORACIC SX. CASE DISCUSSED WITH PATIENT AND SON.   - LEFT ARM SWELLING -  ULTRASOUND NEGATIVE FOR DVT  -  ESRD ON HD - NEPHROLOGY CONSULT IN PROGRESS  - DIASTOLIC CHF - TTE W/ EVIDENCE OF RIGHT/LEFT VENTRICULAR SYSTOLIC DYSFUNCTION, PULMONARY HTN, MR, STRESS TEST-  WITHOUT NEW PERFUSION ABNORMALITIES; CARDIOLOGY CONSULT IN PROGRESS  - A/FIB ON COUMADIN - HOLD COUMADIN & HEPARIN DRIP; PLAN FOR TPA VIA CHEST TUBE TOMORROW PER THORACIC SX  -  PAD, DIABETIC PERIPHERAL NEUROPATHY - PAIN MGMT CONSULT IN PROGRESS  - CASE DISCUSSED EXTENSIVELY WITH SON [SYLVAIN RENTERIA ] AND PATIENT. DISCUSSED RISKS/BENEFITS OF CHEST TUBE PLACEMENT WITH SON, WHO AGREED TO PROCEED WITH INTERVENTION.  -  PAD, DIABETIC PERIPHERAL NEUROPATHY  -  GI AND DVT PROPHYLAXIS    DONALD YETSAMI DIAZ M.D.

## 2020-07-06 NOTE — PROGRESS NOTE ADULT - PROBLEM SELECTOR PLAN 1
-s/p pigtail insertion by IR on 7/2  -cont CT to gravity drainage, mgmt as per CT surgery   -mon daily chest Xray   -Pulm Dr. Delaney   -Thoracic surgery following

## 2020-07-06 NOTE — PROGRESS NOTE ADULT - ATTENDING COMMENTS
Rudy Mnediola MD  [facility]  Office [phone]  Ans Serv [phone] cell [phone]

## 2020-07-06 NOTE — PROGRESS NOTE ADULT - SUBJECTIVE AND OBJECTIVE BOX
Patient denies CP,SOB Review of systems otherwise (-)    aMIOdarone    Tablet 200 milliGRAM(s) Oral daily  aspirin enteric coated 81 milliGRAM(s) Oral daily  calcium acetate 667 milliGRAM(s) Oral three times a day with meals  chlorhexidine 2% Cloths 1 Application(s) Topical daily  dextrose 5%. 1000 milliLiter(s) IV Continuous <Continuous>  dornase danilo Solution for Pleural Effusion 5 milliGRAM(s) IntraPleural. once  insulin lispro (HumaLOG) corrective regimen sliding scale   SubCutaneous three times a day before meals  insulin lispro (HumaLOG) corrective regimen sliding scale   SubCutaneous at bedtime  ketotifen 0.025% Ophthalmic Solution 1 Drop(s) Both EYES two times a day  methimazole 5 milliGRAM(s) Oral daily  metoprolol tartrate 100 milliGRAM(s) Oral two times a day  pantoprazole    Tablet 40 milliGRAM(s) Oral before breakfast  senna 2 Tablet(s) Oral at bedtime  simvastatin 40 milliGRAM(s) Oral at bedtime                            12.4   8.74  )-----------( 169      ( 06 Jul 2020 06:33 )             39.8       Hemoglobin: 12.4 g/dL (07-06 @ 06:33)  Hemoglobin: 11.9 g/dL (07-05 @ 06:40)  Hemoglobin: 12.2 g/dL (07-04 @ 05:25)  Hemoglobin: 13.2 g/dL (07-03 @ 06:25)  Hemoglobin: 12.8 g/dL (07-02 @ 05:48)      07-06    131<L>  |  92<L>  |  64<H>  ----------------------------<  113<H>  4.1   |  25  |  8.66<H>    Ca    9.0      06 Jul 2020 06:33      Creatinine Trend: 8.66<--, 7.39<--, 9.27<--, 8.17<--, 10.70<--, 9.21<--    COAGS: PT/INR - ( 06 Jul 2020 06:33 )   PT: 14.7 sec;   INR: 1.27 ratio         PTT - ( 06 Jul 2020 15:03 )  PTT:61.3 sec          T(C): 36.6 (07-06-20 @ 17:20), Max: 36.8 (07-06-20 @ 07:44)  HR: 76 (07-06-20 @ 17:20) (62 - 86)  BP: 108/53 (07-06-20 @ 17:20) (101/56 - 132/66)  RR: 20 (07-06-20 @ 17:20) (16 - 20)  SpO2: 97% (07-06-20 @ 17:20) (95% - 100%)  Wt(kg): --    I&O's Summary    05 Jul 2020 07:01  -  06 Jul 2020 07:00  --------------------------------------------------------  IN: 0 mL / OUT: 335 mL / NET: -335 mL    Gen: Appears well in NAD  HEENT:  (-)icterus (-)pallor  CV: N S1 S2 1/6 DEJAH (+)2 Pulses B/l  Resp:  Clear to ausculatation B/L, normal effort  GI: (+) BS Soft, NT, ND  Lymph:  (-)lower extremity edema (+) left upper extremity swelling, (-)obvious lymphadenopathy  Skin: Warm to touch, Normal turgor  Psych: Appropriate mood and affect        ASSESSMENT/PLAN: 69y Male  CAD s/p multiple stents, s/p CABG (9/2019 course c/b Afib and LT pleural effusion), Normal LV systolic function, severe Diastolic, RV dysfx CHF, HTN, HLD, DM2, Afib on coumadin and ESRD on HD (Tu, Thurs, Sat at [facility]), hyperthyroidism, presented to ER for pain and swelling of left leg since 3 days.    - LE dopplers negative for dVT   - s/p pigtail drain of left pleural effusion  - Chest tube management per thoracic  - Resume AC when ok with thoracic  - continue with medical management of known CAD with stents with sapt   - f/u left upper extremity imaging     Murphy Colin MD, [facility]  BEEPER [phone]

## 2020-07-06 NOTE — PROGRESS NOTE ADULT - ASSESSMENT
69M s/p IR L pigtail placement on 7/2 for L pleural effusion    - F/u AM CXR   - tPA administration today  - Monitor pigtail output  - Monitor saturation, wean off O2 if able  - Recommend placing pink foam to pigtail area to prevent pressure injury  - Will follow

## 2020-07-06 NOTE — PROGRESS NOTE ADULT - ASSESSMENT
69M with CAD s/p multiple stents, s/p CABG (9/2019 course c/b Afib and LT pleural effusion), Diastolic CHF, HTN, HLD, DM2, Afib on coumadin and ESRD on HD (Tu, Thurs, Sat at [facility]), hyperthyroidism, presented to ER for pain and swelling of left leg. Admitted to medicine for LLE cellulitis. CT chest found to have large L loculated pleural effusion,s/p thoracentesis draining 150cc of blood tinged pleural fluid. Thoracic  surgery following, s/p chest tube insertion on 7/2. Repeat chest xray shows improvement of pleural effusion, Heparin drip resumed. nephro Dr. Calle following. CT remains with increased output. hospital course complicated with worsening delirium, CT head done and showed no acute pathology.

## 2020-07-07 LAB
ALBUMIN SERPL ELPH-MCNC: 2.8 G/DL — LOW (ref 3.5–5)
ALP SERPL-CCNC: 61 U/L — SIGNIFICANT CHANGE UP (ref 40–120)
ALT FLD-CCNC: 18 U/L DA — SIGNIFICANT CHANGE UP (ref 10–60)
ANION GAP SERPL CALC-SCNC: 16 MMOL/L — SIGNIFICANT CHANGE UP (ref 5–17)
APTT BLD: 30.8 SEC — SIGNIFICANT CHANGE UP (ref 27.5–35.5)
AST SERPL-CCNC: 14 U/L — SIGNIFICANT CHANGE UP (ref 10–40)
BILIRUB SERPL-MCNC: 0.6 MG/DL — SIGNIFICANT CHANGE UP (ref 0.2–1.2)
BUN SERPL-MCNC: 81 MG/DL — HIGH (ref 7–18)
CALCIUM SERPL-MCNC: 9 MG/DL — SIGNIFICANT CHANGE UP (ref 8.4–10.5)
CHLORIDE SERPL-SCNC: 91 MMOL/L — LOW (ref 96–108)
CO2 SERPL-SCNC: 25 MMOL/L — SIGNIFICANT CHANGE UP (ref 22–31)
CREAT SERPL-MCNC: 10 MG/DL — HIGH (ref 0.5–1.3)
CULTURE RESULTS: SIGNIFICANT CHANGE UP
GLUCOSE BLDC GLUCOMTR-MCNC: 110 MG/DL — HIGH (ref 70–99)
GLUCOSE BLDC GLUCOMTR-MCNC: 128 MG/DL — HIGH (ref 70–99)
GLUCOSE BLDC GLUCOMTR-MCNC: 149 MG/DL — HIGH (ref 70–99)
GLUCOSE BLDC GLUCOMTR-MCNC: 222 MG/DL — HIGH (ref 70–99)
GLUCOSE SERPL-MCNC: 90 MG/DL — SIGNIFICANT CHANGE UP (ref 70–99)
HCT VFR BLD CALC: 40.7 % — SIGNIFICANT CHANGE UP (ref 39–50)
HGB BLD-MCNC: 12.4 G/DL — LOW (ref 13–17)
INR BLD: 1.54 RATIO — HIGH (ref 0.88–1.16)
MCHC RBC-ENTMCNC: 29 PG — SIGNIFICANT CHANGE UP (ref 27–34)
MCHC RBC-ENTMCNC: 30.5 GM/DL — LOW (ref 32–36)
MCV RBC AUTO: 95.1 FL — SIGNIFICANT CHANGE UP (ref 80–100)
NRBC # BLD: 0 /100 WBCS — SIGNIFICANT CHANGE UP (ref 0–0)
PLATELET # BLD AUTO: 196 K/UL — SIGNIFICANT CHANGE UP (ref 150–400)
POTASSIUM SERPL-MCNC: 4.5 MMOL/L — SIGNIFICANT CHANGE UP (ref 3.5–5.3)
POTASSIUM SERPL-SCNC: 4.5 MMOL/L — SIGNIFICANT CHANGE UP (ref 3.5–5.3)
PROT SERPL-MCNC: 6.7 G/DL — SIGNIFICANT CHANGE UP (ref 6–8.3)
PROTHROM AB SERPL-ACNC: 17.7 SEC — HIGH (ref 10.6–13.6)
RBC # BLD: 4.28 M/UL — SIGNIFICANT CHANGE UP (ref 4.2–5.8)
RBC # FLD: 16 % — HIGH (ref 10.3–14.5)
SODIUM SERPL-SCNC: 132 MMOL/L — LOW (ref 135–145)
SPECIMEN SOURCE: SIGNIFICANT CHANGE UP
WBC # BLD: 8.12 K/UL — SIGNIFICANT CHANGE UP (ref 3.8–10.5)
WBC # FLD AUTO: 8.12 K/UL — SIGNIFICANT CHANGE UP (ref 3.8–10.5)

## 2020-07-07 PROCEDURE — 71045 X-RAY EXAM CHEST 1 VIEW: CPT | Mod: 26

## 2020-07-07 PROCEDURE — 99231 SBSQ HOSP IP/OBS SF/LOW 25: CPT

## 2020-07-07 PROCEDURE — 93971 EXTREMITY STUDY: CPT | Mod: 26,LT

## 2020-07-07 RX ORDER — DORNASE ALFA 1 MG/ML
5 SOLUTION RESPIRATORY (INHALATION) ONCE
Refills: 0 | Status: DISCONTINUED | OUTPATIENT
Start: 2020-07-07 | End: 2020-07-15

## 2020-07-07 RX ORDER — ALTEPLASE 100 MG
2 KIT INTRAVENOUS ONCE
Refills: 0 | Status: DISCONTINUED | OUTPATIENT
Start: 2020-07-07 | End: 2020-07-15

## 2020-07-07 RX ADMIN — Medication 100 MILLIGRAM(S): at 17:24

## 2020-07-07 RX ADMIN — KETOTIFEN FUMARATE 1 DROP(S): 0.34 SOLUTION OPHTHALMIC at 17:23

## 2020-07-07 RX ADMIN — KETOTIFEN FUMARATE 1 DROP(S): 0.34 SOLUTION OPHTHALMIC at 06:35

## 2020-07-07 RX ADMIN — Medication 667 MILLIGRAM(S): at 17:23

## 2020-07-07 RX ADMIN — PANTOPRAZOLE SODIUM 40 MILLIGRAM(S): 20 TABLET, DELAYED RELEASE ORAL at 06:35

## 2020-07-07 RX ADMIN — Medication 2: at 17:15

## 2020-07-07 RX ADMIN — Medication 100 MILLIGRAM(S): at 06:35

## 2020-07-07 RX ADMIN — SENNA PLUS 2 TABLET(S): 8.6 TABLET ORAL at 21:24

## 2020-07-07 RX ADMIN — SIMVASTATIN 40 MILLIGRAM(S): 20 TABLET, FILM COATED ORAL at 21:24

## 2020-07-07 RX ADMIN — AMIODARONE HYDROCHLORIDE 200 MILLIGRAM(S): 400 TABLET ORAL at 06:36

## 2020-07-07 NOTE — CHART NOTE - NSCHARTNOTEFT_GEN_A_CORE
-s/p tpa and dornase administration this afternoon by CT surgery   -f/u CXR in AM   -AC to be resumed after pigtail is pulled out tomorrow   -above plan d/w CT PA

## 2020-07-07 NOTE — PROGRESS NOTE ADULT - ASSESSMENT
×If you receive an error when searching the  Registry clear your cache and log back in.  Using Chrome: Delete your browsing History.  Using Internet Explorer:  Clear your browsing data.   Patient Search   Multi-Patient Search   Reports   Drug Listing   Designation   My ROGERS Numbers  Data Detail Level: Printer-Friendly View Extended View  Confidential Drug Utilization Report  Search Terms: jannie hardin, 1950Search Date: 06/24/2020 15:17:19 PM  The Drug Utilization Report below displays all of the controlled substance prescriptions, if any, that your patient has filled in the last twelve months. The information displayed on this report is compiled from pharmacy submissions to the Department, and accurately reflects the information as submitted by the pharmacies.    This report was requested by: French Pabon | Reference #: [id number]    Others' Prescriptions  Patient Name: Jannie HardinBirth Date: 1950  Address: [address] [place] [id number]: Male  Rx Written	Rx Dispensed	Drug	Quantity	Days Supply	Prescriber Name	Payment Method	Dispenser  10/06/2019	10/06/2019	oxycodone hcl 5 mg tablet	20	5	Bernardo Gary Jo	Medicaid	[facility]  * - Drugs marked with an asterisk are compound drugs. If the compound drug is made up of more than one controlled substance, then each controlled

## 2020-07-07 NOTE — PROGRESS NOTE ADULT - ATTENDING COMMENTS
HPI:  69M with CAD s/p multiple stents, s/p CABG (9/2019 course c/b Afib and LT pleural effusion), Diastolic CHF, HTN, HLD, DM2, Afib on coumadin and ESRD on HD (Tu, Thurs, Sat at [facility]), hyperthyroidism, presented to ER for pain and swelling of left leg since 3 days. Patient reports sustaining injury to posterior aspect of left leg 1 month ago when he accidentally hit his leg against his bed. He reports the wound has not healed since. Reports he developed pain and swelling of left leg since 3 days, 9/10 in severity, with difficulty ambulating. Reports he was advised to get doppler which he reviewed with [facility] cardiologist today, was told he has no clot and recommended to come to ER. Denies fever, chills, weakness, cough, dysuria, NVDC. (18 Jun 2020 19:29)      - AMS DUE TO  METABOLIC ENCEPHALOPATHY AND VASCULAR DEMENTIA  - AVOID DELIRIOGENIC AGENTS, FREQUENT RE-ORIENTATION. CT HEAD IS DONE ON 07-, NO ACUTE CHANGES , BUT CHRONIC LACUNAR INFARCTS. D/W PATIENT'S SON AND HIS CARDIOLOGIST     -  CELLULITIS OF LEFT LEG, DIABETIC LEFT FOOT ULCER  - IMPROVED ON VANCOMYCIN [PER ID RECOMMENDATION,  BLOOD  CXS NGTD. ID F/UP IS IN PROGRESS  BY DR. DUEÑAS    - LOCULATED LEFT SIDED PLEURAL EFFUSION S/P IR PLACED PIG TAIL CATHETER ON  07-, STILL IS DRAINING . RPT CT CHEST SHOWS IMPROVEMENT BUT PERSISTENT EFFUSION.  - PULMONOLOGY CONSULT & THORACIC SURGERY CONSULT IN PROGRESS. S/P TPA VIA CHEST TUBE PER THORACIC SX. CASE DISCUSSED WITH PATIENT AND SON.   - LEFT ARM SWELLING -  ULTRASOUND NEGATIVE FOR DVT, VASCULAR SX CONSULT IN PLACE - RECOMMENDED OUTPATIENT FISTULOGRAM TO EVALUATION FOR CENTRAL STENOSIS  -  ESRD ON HD - NEPHROLOGY CONSULT IN PROGRESS  - DIASTOLIC CHF - TTE W/ EVIDENCE OF RIGHT/LEFT VENTRICULAR SYSTOLIC DYSFUNCTION, PULMONARY HTN, MR, STRESS TEST-  WITHOUT NEW PERFUSION ABNORMALITIES; CARDIOLOGY CONSULT IN PROGRESS  - A/FIB ON COUMADIN - HOLD COUMADIN & HEPARIN DRIP; S/P TPA VIA CHEST TUBE  PER THORACIC SX  -  PAD, DIABETIC PERIPHERAL NEUROPATHY - PAIN MGMT CONSULT IN PROGRESS  - CASE DISCUSSED EXTENSIVELY WITH SON [SYLVAIN RENTERIA ] AND PATIENT. DISCUSSED RISKS/BENEFITS OF CHEST TUBE PLACEMENT WITH SON, WHO AGREED TO PROCEED WITH INTERVENTION.  -  PAD, DIABETIC PERIPHERAL NEUROPATHY  -  GI AND DVT PROPHYLAXIS    DONALD DIAZ M.D. COVERING FOR EPHRAIM DIAZ M.D.

## 2020-07-07 NOTE — PROGRESS NOTE ADULT - PROBLEM SELECTOR PLAN 3
-left arm with edema and tenderness   -afebrile, no leukocytosis   -completed Vanco   -LUE and LE dopplers negative for DVT  -ID Dr. Rayo  -Vascular consulted today

## 2020-07-07 NOTE — CHART NOTE - NSCHARTNOTEFT_GEN_A_CORE
-spoke to pt's son Randal Hardin this evening, updated on clinical course and events, all questions answered, emotional support provided.

## 2020-07-07 NOTE — PROGRESS NOTE ADULT - ASSESSMENT
69M with CAD s/p multiple stents, s/p CABG (9/2019 course c/b Afib and LT pleural effusion), Diastolic CHF, HTN, HLD, DM2, Afib on coumadin and ESRD on HD (Tu, Thurs, Sat at [facility]), hyperthyroidism, presented to ER for pain and swelling of left leg. Admitted to medicine for LLE cellulitis. CT chest found to have large L loculated pleural effusion,s/p thoracentesis draining 150cc of blood tinged pleural fluid. Thoracic  surgery following, s/p chest tube insertion on 7/2. Repeat chest xray shows improvement of pleural effusion, Heparin drip resumed. nephro Dr. Calle following. CT remains with increased output. hospital course complicated with worsening delirium, CT head done and showed no acute pathology and worsening left arm swelling, vascular consult in progress

## 2020-07-07 NOTE — PROGRESS NOTE ADULT - SUBJECTIVE AND OBJECTIVE BOX
Patient is a 69y old  Male who presents with a chief complaint of leg pain (05 Jul 2020 11:58)    OVERNIGHT EVENTS: no acute events overnight     REVIEW OF SYSTEMS:  CONSTITUTIONAL: No fever, chills  RESPIRATORY: No cough, SOB  CARDIOVASCULAR: No chest pain, palpitations  GASTROINTESTINAL: No abdominal pain. No nausea, vomiting, or diarrhea  GENITOURINARY: No dysuria  NEUROLOGICAL: No HA  MUSCULOSKELETAL: +ve left arm pain     T(C): 36.9 (07-07-20 @ 09:40), Max: 36.9 (07-06-20 @ 23:39)  HR: 74 (07-07-20 @ 09:40) (62 - 89)  BP: 99/62 (07-07-20 @ 09:40) (99/62 - 115/66)  RR: 18 (07-07-20 @ 09:40) (16 - 20)  SpO2: 97% (07-07-20 @ 09:40) (95% - 97%)  Wt(kg): --Vital Signs Last 24 Hrs  T(C): 36.9 (07 Jul 2020 09:40), Max: 36.9 (06 Jul 2020 23:39)  T(F): 98.4 (07 Jul 2020 09:40), Max: 98.4 (06 Jul 2020 23:39)  HR: 74 (07 Jul 2020 09:40) (62 - 89)  BP: 99/62 (07 Jul 2020 09:40) (99/62 - 115/66)  BP(mean): --  RR: 18 (07 Jul 2020 09:40) (16 - 20)  SpO2: 97% (07 Jul 2020 09:40) (95% - 97%)    MEDICATIONS  (STANDING):  alteplase  Injectable for Pleural Effusion 2 milliGRAM(s) IntraPleural. once  aMIOdarone    Tablet 200 milliGRAM(s) Oral daily  aspirin enteric coated 81 milliGRAM(s) Oral daily  calcium acetate 667 milliGRAM(s) Oral three times a day with meals  chlorhexidine 2% Cloths 1 Application(s) Topical daily  dextrose 5%. 1000 milliLiter(s) (50 mL/Hr) IV Continuous <Continuous>  dornase danilo Solution for Pleural Effusion 5 milliGRAM(s) IntraPleural. once  dornase danilo Solution for Pleural Effusion 5 milliGRAM(s) IntraPleural. once  insulin lispro (HumaLOG) corrective regimen sliding scale   SubCutaneous three times a day before meals  insulin lispro (HumaLOG) corrective regimen sliding scale   SubCutaneous at bedtime  ketotifen 0.025% Ophthalmic Solution 1 Drop(s) Both EYES two times a day  methimazole 5 milliGRAM(s) Oral daily  metoprolol tartrate 100 milliGRAM(s) Oral two times a day  pantoprazole    Tablet 40 milliGRAM(s) Oral before breakfast  senna 2 Tablet(s) Oral at bedtime  simvastatin 40 milliGRAM(s) Oral at bedtime    MEDICATIONS  (PRN):      PHYSICAL EXAM:  GENERAL: NAD  NECK: Supple, No JVD  CHEST/LUNG: Clear to diminished bilaterally; No rales, rhonchi, wheezing, or rubs, left chest pigtail to gravity drainage with serosanguinous drainage   HEART: S1, S2, Regular rate and rhythm  ABDOMEN: Soft, Nontender, Nondistended; Bowel sounds present  NEURO: Alert & Oriented to person and place, disoriented to time and situation at times, equal strength and sensation, no focal neuro deficit   EXTREMITIES: No LE edema, no calf tenderness, +ve left arm tenderness and 2-3+ edema   	  Consultant(s) Notes Reviewed:  [x ] YES  [ ] NO  Care Discussed with Consultants/Other Providers [ x] YES  [ ] NO    LABS:                        12.4   8.12  )-----------( 196      ( 07 Jul 2020 07:05 )             40.7     07-07    132<L>  |  91<L>  |  81<H>  ----------------------------<  90  4.5   |  25  |  10.00<H>    Ca    9.0      07 Jul 2020 07:05    TPro  6.7  /  Alb  2.8<L>  /  TBili  0.6  /  DBili  x   /  AST  14  /  ALT  18  /  AlkPhos  61  07-07    PT/INR - ( 07 Jul 2020 07:05 )   PT: 17.7 sec;   INR: 1.54 ratio         PTT - ( 07 Jul 2020 07:05 )  PTT:30.8 sec    CAPILLARY BLOOD GLUCOSE      POCT Blood Glucose.: 149 mg/dL (07 Jul 2020 11:01)  POCT Blood Glucose.: 110 mg/dL (07 Jul 2020 08:09)  POCT Blood Glucose.: 136 mg/dL (06 Jul 2020 21:05)  POCT Blood Glucose.: 156 mg/dL (06 Jul 2020 16:37)            RADIOLOGY & ADDITIONAL TESTS:    Imaging Personally Reviewed:  [ ] YES  [ ] NO

## 2020-07-07 NOTE — PROGRESS NOTE ADULT - PROBLEM SELECTOR PLAN 1
Pt with left lower leg pain which is somatic and neuropathic in nature due to cellulitis. Vancomycin completed 6/30. Pt s/p left thoracentesis 6/30, s/p IR L pigtail placement on 7/2 for L pleural effusion.   High risk medications reviewed. Avoid polypharmacy. Avoid IV opioids. Avoid NSAIDs and benzodiazepines. Non-opioid medications and non-pharmacological pain management measures initiated.   Opioid pain recommendations   - Maximize non-opioid pain recommendations. Per pt's family, pt is sensitive to opioids, experiences delirium and/or aggression.  Non-opioid pain recommendations   - Consider Acetaminophen 650mg PO q 6 hours PRN moderate pain.   - Avoid NSAIDs due to ESRD  Bowel Regimen  - Continue Senna 2 tablets at bedtime for constipation  Mild pain   - Non-pharmacological pain treatment recommendations  - Warm/ Cool packs PRN   - Repositioning extremity, elevation, imagery, relaxation, distraction.  - Physical therapy OOB if no contraindications   Recommendations discussed with primary team and RN

## 2020-07-07 NOTE — PROGRESS NOTE ADULT - ASSESSMENT
Patient is a 69y Male whom presented to the hospital with LT leg pain and swelling.  Was sent from cardiology clinic where he had gone for routine evaluation as was noted  to have RT leg swelling, to R/O DVT. ( doppler US negative for DVT).admitted for treatment of leg cellulitis   Renal consulted for ESRD. Large left loculated pleural effusion s/p L pigtail catheter.     A/P  #ESRD seen on  HD tomorrow- stable . Otherwise electrolytes acceptable. LUE swelling noted, no issues cannulating AV access.   # hyponatremia . In a patient with hypervolemia and lung pathology. stable.   fluid restriction 1.2 litres/day.  RPT BMP daily   # Anemia of CKD. stable off ROSARIO  # lytes and acid base at goal  on mx for bilateral leg cellulitis, Neg DVT.   has LT sided chest tube for loculated pleural effusion

## 2020-07-07 NOTE — PROGRESS NOTE ADULT - SUBJECTIVE AND OBJECTIVE BOX
Source of information: VIOLETTA RENTERIA, Chart review  Patient language: English  : n/a    HPI:  69M with CAD s/p multiple stents, s/p CABG (9/2019 course c/b Afib and LT pleural effusion), Diastolic CHF, HTN, HLD, DM2, Afib on coumadin and ESRD on HD (Tu, Thurs, Sat at [facility]), hyperthyroidism, presented to ER for pain and swelling of left leg since 3 days. Patient reports sustaining injury to posterior aspect of left leg 1 month ago when he accidentally hit his leg against his bed. He reports the wound has not healed since. Reports he developed pain and swelling of left leg since 3 days, 9/10 in severity, with difficulty ambulating. Reports he was advised to get doppler which he reviewed with [facility] cardiologist today, was told he has no clot and recommended to come to ER. Denies fever, chills, weakness, cough, dysuria, NVDC. (18 Jun 2020 19:29)      This is a Patient is a 69y old  Male who presents with a chief complaint of leg pain. Found to have left leg cellulitis, vancomycin completed 6/30. Pt s/p left thoracentesis 6/30, s/p IR L pigtail placement on 7/2 for L pleural effusion.  Pt seen and examined at bedside. Pt laying in bed, sleepy. Plan for HD today and possible removal of pigtail per primary team. Pt tolerating PO diet. Pt denies lethargy, nausea, vomiting, constipation and itchiness. Reports last BM yesterday, 7/6. Patient stated goal for pain control: to be able to get out of bed to chair and ambulate with tolerable pain control.     PAST MEDICAL & SURGICAL HISTORY:  ESRD (end stage renal disease) on dialysis: T-Th-Sat  Stented coronary artery: total 15 stents from [id number]  Hyperthyroidism  H/O: CVA: after cardiac stent placed 12/15/09 -no residual  Heart Attack: 2/1/07 with subsequent stent  Coronary Stent: [id number] 10 stents,  to Ramus 1/2015, cath 01/2016 ( see results in HPI)  Hypercholesterolemia  CAD (Coronary Artery Disease)  DM (Diabetes Mellitus): x 4 yrs without N/N/R  HTN (Hypertension)  S/P CABG x 4  Hemodialysis access, AV graft: 5/2015, L arm  S/P cataract extraction  Boil of Buttock: 2006 and 2008      FAMILY HISTORY:  Family history of coronary artery disease  Family history of diabetes mellitus (Sibling)      Social History:  denies smoking, etoh or illicit drug use (18 Jun 2020 19:29)    Allergies    lisinopril (Other)        MEDICATIONS  (STANDING):  alteplase  Injectable for Pleural Effusion 2 milliGRAM(s) IntraPleural. once  aMIOdarone    Tablet 200 milliGRAM(s) Oral daily  aspirin enteric coated 81 milliGRAM(s) Oral daily  calcium acetate 667 milliGRAM(s) Oral three times a day with meals  chlorhexidine 2% Cloths 1 Application(s) Topical daily  dextrose 5%. 1000 milliLiter(s) (50 mL/Hr) IV Continuous <Continuous>  dornase danilo Solution for Pleural Effusion 5 milliGRAM(s) IntraPleural. once  dornase danilo Solution for Pleural Effusion 5 milliGRAM(s) IntraPleural. once  insulin lispro (HumaLOG) corrective regimen sliding scale   SubCutaneous three times a day before meals  insulin lispro (HumaLOG) corrective regimen sliding scale   SubCutaneous at bedtime  ketotifen 0.025% Ophthalmic Solution 1 Drop(s) Both EYES two times a day  methimazole 5 milliGRAM(s) Oral daily  metoprolol tartrate 100 milliGRAM(s) Oral two times a day  pantoprazole    Tablet 40 milliGRAM(s) Oral before breakfast  senna 2 Tablet(s) Oral at bedtime  simvastatin 40 milliGRAM(s) Oral at bedtime    MEDICATIONS  (PRN):      Vital Signs Last 24 Hrs  T(C): 36.9 (07 Jul 2020 09:40), Max: 36.9 (06 Jul 2020 23:39)  T(F): 98.4 (07 Jul 2020 09:40), Max: 98.4 (06 Jul 2020 23:39)  HR: 74 (07 Jul 2020 09:40) (62 - 89)  BP: 99/62 (07 Jul 2020 09:40) (99/62 - 115/66)  BP(mean): --  RR: 18 (07 Jul 2020 09:40) (16 - 20)  SpO2: 97% (07 Jul 2020 09:40) (95% - 97%)  COVID-19 PCR: NotDetec (26 Jun 2020 12:06)    LABS: Reviewed                          12.4   8.12  )-----------( 196      ( 07 Jul 2020 07:05 )             40.7     07-07    132<L>  |  91<L>  |  81<H>  ----------------------------<  90  4.5   |  25  |  10.00<H>    Ca    9.0      07 Jul 2020 07:05    TPro  6.7  /  Alb  2.8<L>  /  TBili  0.6  /  DBili  x   /  AST  14  /  ALT  18  /  AlkPhos  61  07-07    PT/INR - ( 07 Jul 2020 07:05 )   PT: 17.7 sec;   INR: 1.54 ratio         PTT - ( 07 Jul 2020 07:05 )  PTT:30.8 sec  LIVER FUNCTIONS - ( 07 Jul 2020 07:05 )  Alb: 2.8 g/dL / Pro: 6.7 g/dL / ALK PHOS: 61 U/L / ALT: 18 U/L DA / AST: 14 U/L / GGT: x             CAPILLARY BLOOD GLUCOSE      POCT Blood Glucose.: 110 mg/dL (07 Jul 2020 08:09)  POCT Blood Glucose.: 136 mg/dL (06 Jul 2020 21:05)  POCT Blood Glucose.: 156 mg/dL (06 Jul 2020 16:37)  POCT Blood Glucose.: 204 mg/dL (06 Jul 2020 11:41)    COVID-19 PCR: NotDetec (26 Jun 2020 12:06)            Radiology:    < from: CT Chest No Cont (06.27.20 @ 09:21) >    EXAM:  CT CHEST                            PROCEDURE DATE:  06/27/2020          INTERPRETATION:  CLINICAL INFORMATION: Large left pleural effusion. Cellulitis. End-stage renal disease    COMPARISON: Chest x-ray 6/26/2020. CT chest 1/20/2020.    PROCEDURE:   CT of the Chest was performed without intravenous contrast.  Sagittal and coronal reformats were performed.    FINDINGS:    LUNGS AND AIRWAYS: Patent central airways.  There is a large loculated left pleural effusion which is increased in size since the previous exam. There is associated left lung compressive atelectasis. Minimal dependent atelectatic changes in the right lung.  PLEURA: No pleural effusion.  MEDIASTINUM AND YOANA: No lymphadenopathy.  VESSELS: Atherosclerotic changes. Left subclavian vein stent.  HEART: Cardiomegaly. Coronary artery calcifications No pericardial effusion.  CHEST WALL AND LOWER NECK: Moderately enlarged thyroid gland is within previous study. This would be better evaluated with ultrasound as clinicallyindicated.  VISUALIZED UPPER ABDOMEN: Within normal limits.  BONES: Sternotomy with prominent sternal dehiscence unchanged from the previous exam. No pre- or poststernal fluid collections. Degenerative changes.    IMPRESSION:     There is a large loculated left pleural effusion which is increased in size since the previous exam. There is associated compressive atelectasis.                  LYNDON URIARTE M.D., ATTENDING RADIOLOGIST  This document has been electronically signed. Jun 27 2020  9:28AM              < end of copied text >    < from: Xray Tibia + Fibula 2 Views, Left (06.22.20 @ 10:15) >  EXAM:  LEG AP&LAT - LEFT                            PROCEDURE DATE:  06/22/2020          INTERPRETATION:  CLINICAL INDICATION:  Diabetic ulceration. Evaluate for osteomyelitis.    COMPARISON:  None    TECHNIQUE:  AP and lateral radiographs of the left lower leg performed.    FINDINGS:  There is no evidence for acute or chronic fracture deformity of the left tibia or fibula. No regions of osteolysis or osteosclerosis identified. No soft tissue swelling is identified. No retained radiodense foreign body noted. Extensive vascular calcification identified. The knee and ankle articulations appear intact. Note is made of a small plantar calcaneal spur as well as spurring along the posterior aspect of the calcaneus, superiorly directed.    IMPRESSION:  As above.                  SHEFALI CANALES M.D., ATTENDING RADIOLOGIST  This document has been electronically signed. Jun 22 2020 11:24AM                < end of copied text >    < from: US Duplex Venous Lower Ext Complete, Bilateral (06.20.20 @ 11:32) >    EXAM:  US DPLX LWR EXT VEINS COMPL BI                            PROCEDURE DATE:  06/20/2020          INTERPRETATION:  CLINICAL INFORMATION: Bilateral leg swelling    COMPARISON: Bilateral lower extremity venous duplex 11/16/2012    TECHNIQUE: Duplex sonography of the BILATERAL LOWER extremity veins with color and spectral Doppler, with and without compression.      FINDINGS:    There is normal compressibility of the bilateral common femoral, femoral and popliteal veins.   Doppler examination shows normal spontaneous and phasic flow.    No calf vein thrombosis is detected.    IMPRESSION:   No evidence of deep venous thrombosis in either lower extremity.                    VANNA MAHER M.D., ATTENDING RADIOLOGIST  This document has been electronically signed. Jun 20 2020 11:37AM    < end of copied text >        < from: Xray Tibia + Fibula 2 Views, Left (06.22.20 @ 10:15) >  EXAM:  LEG AP&LAT - LEFT                            PROCEDURE DATE:  06/22/2020          INTERPRETATION:  CLINICAL INDICATION:  Diabetic ulceration. Evaluate for osteomyelitis.    COMPARISON:  None    TECHNIQUE:  AP and lateral radiographs of the left lower leg performed.    FINDINGS:  There is no evidence for acute or chronic fracture deformity of the left tibia or fibula. No regions of osteolysis or osteosclerosis identified. No soft tissue swelling is identified. No retained radiodense foreign body noted. Extensive vascular calcification identified. The knee and ankle articulations appear intact. Note is made of a small plantar calcaneal spur as well as spurring along the posterior aspect of the calcaneus, superiorly directed.    IMPRESSION:  As above.        < end of copied text >        ORT Score -   Family Hx of substance abuse	Female	Male  Alcohol 	                                              1              3  Illegal drugs	                                      2              3  Rx drugs                                               4	      4  Personal Hx of substance abuse		  Alcohol 	                                               3	      3  Illegal drugs                                  	       4	      4  Rx drugs                                                5	      5  Age between 16- 45 years	               1             1  hx preadolescent sexual abuse	       3	      0  Psychological disease		  ADD, OCD, bipolar, schizophrenia	2	      2  Depression                                    	1	      1  Score totals 0 		  		  a score of 3 or lower indicates low risk for opioid abuse		  a score of 4-7 indicates moderate risk for opioid abuse		  a score of 8 or higher indicates high risk for opioid abuse	    Risk factors associated with adverse outcomes related to opioid treatment  [ ]  Concurrent benzodiazepine use  [ ]  History/ Active substance use or alcohol use disorder  [ ] Psychiatric co-morbidity  [ ] Sleep apnea  [ ] COPD  [ ] BMI> 35  [ ] Liver dysfunction  [X ] Renal dysfunction  [ ] CHF  [ ] Smoker  [X ]  Age > 60 years    4AT (Assessment test for delirium & cognitive impairment)  _________________________________________________________  [1] ALERTNESS  This includes patients who may be markedly drowsy (eg. difficult to rouse and/or obviously sleepy  during assessment) or agitated/hyperactive. Observe the patient. If asleep, attempt to wake with  speech or gentle touch on shoulder. Ask the patient to state their name and address to assist rating.  Normal (fully alert, but not agitated, throughout assessment) 0  Mild sleepiness for <10 seconds after waking, then normal 0  Clearly abnormal 4    [2] AMT4  Age, date of birth, place (name of the hospital or building), current year.  No mistakes 0  1 mistake 1  2 or more mistakes/untestable 2    [3] ATTENTION  Ask the patient: “Please tell me the months of the year in backwards order, starting at December.”  To assist initial understanding one prompt of “what is the month before December?” is permitted.  Months of the year backwards Achieves 7 months or more correctly 0  Starts but scores <7 months / refuses to start 1 Untestable (cannot start because unwell, drowsy, inattentive) 2    [4] ACUTE CHANGE OR FLUCTUATING COURSE  Evidence of significant change or fluctuation in: alertness, cognition, other mental function  (eg. paranoia, hallucinations) arising over the last 2 weeks and still evident in last 24hrs  No 0  Yes 4    4 or above: possible delirium +/- cognitive impairment  1-3: possible cognitive impairment  0: delirium or severe cognitive impairment unlikely (but delirium still possible if [4] information incomplete)  4AT SCORE 0    REVIEW OF SYSTEMS:  CONSTITUTIONAL: No fever. Positive fatigue  RESPIRATORY: No cough, wheezing, chills or hemoptysis; No shortness of breath  CARDIOVASCULAR: No chest pain, palpitations, dizziness, or leg swelling  GASTROINTESTINAL: No abdominal or epigastric pain. No nausea, vomiting; No diarrhea or constipation.   GENITOURINARY: anuric + ckd - on hd  MUSCULOSKELETAL:  left leg pain + constant throbbing pain  NEURO: alert and oriented.  Periods of delirium on 6/23  PSYCHIATRIC:  difficulty sleeping    PHYSICAL EXAM:  GENERAL:  Alert & Oriented X3, Sleepy  CHEST/LUNG: Decreased breath sounds, left posterior chest pigtail catheter in place, dressing c/d/i  HEART: Regular rate and rhythm; No murmurs, rubs, or gallops  ABDOMEN: Distended, Nontender, Bowel sounds present  : Anuric on HD  EXTREMITIES: Positive left hand moderate edema. 2+ Peripheral Pulses, No cyanosis; Rt upper arm HD AV fistula site c/d/i, positive thrill and bruit   MUSCULOSKELETAL: + left leg moderate edema and tenderness    SKIN: + dried laceration over left shin noted + dried scab over left calf noted + vascular changes noted       [ X]  [place]  Reviewed and Copied to Chart. See below.    Plan of care and goal oriented pain management treatment options were discussed with patient and /or primary care giver; all questions and concerns were addressed and care was aligned with patient's wishes.    Educated patient on goal oriented pain management treatment options     07-07-20 @ 11:01

## 2020-07-07 NOTE — PROGRESS NOTE ADULT - PROBLEM SELECTOR PLAN 1
-s/p pigtail insertion by IR on 7/2  -cont CT to gravity drainage, mgmt as per CT surgery  -for TPA to be administered today   -cont to hold Heparin and Coumadin as per CT surgery for TPA administration and possible CT removal   -mon daily chest Xray   -Pulm Dr. Delaney   -Thoracic surgery following

## 2020-07-07 NOTE — CONSULT NOTE ADULT - ASSESSMENT
Patient is a 69y Male whom presented to the hospital with LT leg pain and swelling.  Was sent from cardiology clinic where he had gone for routine evaluation as was noted  to have RT leg swelling, to R/O DVT. ( doppler US negative for DVT).admitted for treatment of leg cellulitis   Renal consulted for ESRD    A/P  #ESRD . HD in AM  # Anemia of CKD. stable off ROSARIO  # lytes and acid base at goal  on mx for bilateral leg cellulitis    Many thanks for  the consult
×If you receive an error when searching the  Registry clear your cache and log back in.  Using Chrome: Delete your browsing History.  Using Internet Explorer:  Clear your browsing data.   Patient Search   Multi-Patient Search   Reports   Drug Listing   Designation   My ROGERS Numbers  Data Detail Level: Printer-Friendly View Extended View  Confidential Drug Utilization Report  Search Terms: jannie hardin, 1950Search Date: 06/24/2020 15:17:19 PM  The Drug Utilization Report below displays all of the controlled substance prescriptions, if any, that your patient has filled in the last twelve months. The information displayed on this report is compiled from pharmacy submissions to the Department, and accurately reflects the information as submitted by the pharmacies.    This report was requested by: French Pabon | Reference #: [id number]    Others' Prescriptions  Patient Name: Jannie HardinBirth Date: 1950  Address: [address] [place] [id number]: Male  Rx Written	Rx Dispensed	Drug	Quantity	Days Supply	Prescriber Name	Payment Method	Dispenser  10/06/2019	10/06/2019	oxycodone hcl 5 mg tablet	20	5	Bernardo Gary Jo	Medicaid	[facility]  * - Drugs marked with an asterisk are compound drugs. If the compound drug is made up of more than one controlled substance, then each controlled
left arm swelling, circumferential, for 1 week, worse past few days with some pain to dorsum or left hand  hand warm, nvi,   doubt heme related as hct essentially stable over this time  no LUE clot per duplex last week  successful HD today with functioning LUE avf    elevation of LUE  will discuss with Vascular surgery attending on call,  (called left message)  possible CT to r/o central stenosis
Left leg Cellulitis - did not improve with previous antibiotics.    Plan - start Vancomycin 1 gm iv T-T-S( intra or post dialysis)  if improving by Thursday then can be DCed home on Thursday post Dialysis on Vancomycin for an additional 7 days.

## 2020-07-07 NOTE — PROGRESS NOTE ADULT - SUBJECTIVE AND OBJECTIVE BOX
Informed consent obtained earlier from pt's son for intrapleural instillation of alteplace/dornase solution.  Risks include bleeding, infection, possible CVA,, benefits include potential improve drainage of pigtail cath. Alternatives include refuse procedure and continued monitoring of drainage. Pt's son agrees for procedure.  As per Dr Torres, 2mg alteplace and 5mg dornase instilled intrapleurally via pigtail under sterile conditions. Pigtail clamped for 1hr.  Pt tolerated well, no complaints.  pigtail to suction after unclamped and f/u cxr to be ordered.  Nursing informed.

## 2020-07-07 NOTE — PROGRESS NOTE ADULT - PROBLEM SELECTOR PLAN 2
-periods of confusions slowly improving, likely due to hospital delirium   -CT head with chronic changes   -normalize sleep wake cycle, frequent reorientation   -avoid delirium provoking agents   -pt with similar episodes during prior hospitalization as per son, pt may benefit from increased hrs of family presence at bedside

## 2020-07-07 NOTE — PROGRESS NOTE ADULT - SUBJECTIVE AND OBJECTIVE BOX
Patient denies CP,SOB Review of systems otherwise (-)    alteplase  Injectable for Pleural Effusion 2 milliGRAM(s) IntraPleural. once  aMIOdarone    Tablet 200 milliGRAM(s) Oral daily  aspirin enteric coated 81 milliGRAM(s) Oral daily  calcium acetate 667 milliGRAM(s) Oral three times a day with meals  chlorhexidine 2% Cloths 1 Application(s) Topical daily  dextrose 5%. 1000 milliLiter(s) IV Continuous <Continuous>  dornase danilo Solution for Pleural Effusion 5 milliGRAM(s) IntraPleural. once  dornase danilo Solution for Pleural Effusion 5 milliGRAM(s) IntraPleural. once  insulin lispro (HumaLOG) corrective regimen sliding scale   SubCutaneous three times a day before meals  insulin lispro (HumaLOG) corrective regimen sliding scale   SubCutaneous at bedtime  ketotifen 0.025% Ophthalmic Solution 1 Drop(s) Both EYES two times a day  methimazole 5 milliGRAM(s) Oral daily  metoprolol tartrate 100 milliGRAM(s) Oral two times a day  pantoprazole    Tablet 40 milliGRAM(s) Oral before breakfast  senna 2 Tablet(s) Oral at bedtime  simvastatin 40 milliGRAM(s) Oral at bedtime                            12.4   8.12  )-----------( 196      ( 07 Jul 2020 07:05 )             40.7       Hemoglobin: 12.4 g/dL (07-07 @ 07:05)  Hemoglobin: 12.4 g/dL (07-06 @ 06:33)  Hemoglobin: 11.9 g/dL (07-05 @ 06:40)  Hemoglobin: 12.2 g/dL (07-04 @ 05:25)  Hemoglobin: 13.2 g/dL (07-03 @ 06:25)      07-07    132<L>  |  91<L>  |  81<H>  ----------------------------<  90  4.5   |  25  |  10.00<H>    Ca    9.0      07 Jul 2020 07:05    TPro  6.7  /  Alb  2.8<L>  /  TBili  0.6  /  DBili  x   /  AST  14  /  ALT  18  /  AlkPhos  61  07-07    Creatinine Trend: 10.00<--, 8.66<--, 7.39<--, 9.27<--, 8.17<--, 10.70<--    COAGS: PT/INR - ( 07 Jul 2020 07:05 )   PT: 17.7 sec;   INR: 1.54 ratio         PTT - ( 07 Jul 2020 07:05 )  PTT:30.8 sec          T(C): 36.4 (07-07-20 @ 13:47), Max: 36.9 (07-06-20 @ 23:39)  HR: 74 (07-07-20 @ 13:47) (61 - 89)  BP: 107/63 (07-07-20 @ 13:47) (99/62 - 115/66)  RR: 18 (07-07-20 @ 13:47) (16 - 20)  SpO2: 98% (07-07-20 @ 13:47) (95% - 100%)  Wt(kg): --    I&O's Summary    07 Jul 2020 07:01  -  07 Jul 2020 14:38  --------------------------------------------------------  IN: 400 mL / OUT: 1500 mL / NET: -1100 mL        Gen: Appears well in NAD  HEENT:  (-)icterus (-)pallor  CV: N S1 S2 1/6 DEJAH (+)2 Pulses B/l  Resp:  Clear to ausculatation B/L, normal effort  GI: (+) BS Soft, NT, ND  Lymph:  (-)lower extremity edema (+) left upper extremity swelling, (-)obvious lymphadenopathy  Skin: Warm to touch, Normal turgor  Psych: Appropriate mood and affect        ASSESSMENT/PLAN: 69y Male  CAD s/p multiple stents, s/p CABG (9/2019 course c/b Afib and LT pleural effusion), Normal LV systolic function, severe Diastolic, RV dysfx CHF, HTN, HLD, DM2, Afib on coumadin and ESRD on HD (Tu, Thurs, Sat at [facility]), hyperthyroidism, presented to ER for pain and swelling of left leg since 3 days.    - LE dopplers negative for dVT   - s/p pigtail drain of left pleural effusion, s/p TPA today  - Chest tube management per thoracic  - Resume AC when ok with thoracic  - continue with medical management of known CAD with stents with sapt   - left upper extremity cellulitis f/u ID and vascular eval    Murphy Colin MD, [facility]  BEEPER [phone]

## 2020-07-07 NOTE — PROGRESS NOTE ADULT - SUBJECTIVE AND OBJECTIVE BOX
INTERVAL HPI/OVERNIGHT EVENTS:    Pt seen and examined at bedside. No acute complaints at this time     Vital Signs Last 24 Hrs  T(C): 36.4 (07 Jul 2020 08:01), Max: 36.9 (06 Jul 2020 23:39)  T(F): 97.5 (07 Jul 2020 08:01), Max: 98.4 (06 Jul 2020 23:39)  HR: 82 (07 Jul 2020 08:01) (62 - 89)  BP: 107/57 (07 Jul 2020 08:01) (101/56 - 115/66)  BP(mean): --  RR: 20 (07 Jul 2020 08:01) (16 - 20)  SpO2: 97% (07 Jul 2020 08:01) (95% - 97%)  I&O's Detail    pantoprazole    Tablet 40 milliGRAM(s) Oral before breakfast  senna 2 Tablet(s) Oral at bedtime    Physical Exam  General: No acute distress  Chest: left pigtail in place, to water seal, -AL, output ss, dressing c/d/i, no crepitus     Labs:                        12.4   8.12  )-----------( 196      ( 07 Jul 2020 07:05 )             40.7     07-07    132<L>  |  91<L>  |  81<H>  ----------------------------<  90  4.5   |  25  |  10.00<H>    Ca    9.0      07 Jul 2020 07:05    TPro  6.7  /  Alb  2.8<L>  /  TBili  0.6  /  DBili  x   /  AST  14  /  ALT  18  /  AlkPhos  61  07-07    PT/INR - ( 07 Jul 2020 07:05 )   PT: 17.7 sec;   INR: 1.54 ratio         PTT - ( 07 Jul 2020 07:05 )  PTT:30.8 sec

## 2020-07-07 NOTE — PROGRESS NOTE ADULT - ASSESSMENT
s/p IR L pigtail placement on 7/2 for L pleural effusion, decreased chest tube output, s/p TPA     1. F/u AM CXR   2. Monitor pigtail output  3. TPA to be given today   4. Will follow

## 2020-07-07 NOTE — PROGRESS NOTE ADULT - SUBJECTIVE AND OBJECTIVE BOX
[facility] : Progress Note :: [phone], (office [phone]),   Dr Calle / Dr Esposito / Dr Macias / Dr Mendiola / Dr Narendra JULIEN / Dr Arroyo / Dr Knight / Dr Renan pickens  _____________________________________________________________________________________________    Seen on HD.  has LT sided chest-tube    lisinopril (Other)  opioid-like analgesics (Other)    Hospital Medications:   MEDICATIONS  (STANDING):  alteplase  Injectable for Pleural Effusion 2 milliGRAM(s) IntraPleural. once  aMIOdarone    Tablet 200 milliGRAM(s) Oral daily  aspirin enteric coated 81 milliGRAM(s) Oral daily  calcium acetate 667 milliGRAM(s) Oral three times a day with meals  chlorhexidine 2% Cloths 1 Application(s) Topical daily  dextrose 5%. 1000 milliLiter(s) (50 mL/Hr) IV Continuous <Continuous>  dornase danilo Solution for Pleural Effusion 5 milliGRAM(s) IntraPleural. once  dornase danilo Solution for Pleural Effusion 5 milliGRAM(s) IntraPleural. once  insulin lispro (HumaLOG) corrective regimen sliding scale   SubCutaneous three times a day before meals  insulin lispro (HumaLOG) corrective regimen sliding scale   SubCutaneous at bedtime  ketotifen 0.025% Ophthalmic Solution 1 Drop(s) Both EYES two times a day  methimazole 5 milliGRAM(s) Oral daily  metoprolol tartrate 100 milliGRAM(s) Oral two times a day  pantoprazole    Tablet 40 milliGRAM(s) Oral before breakfast  senna 2 Tablet(s) Oral at bedtime  simvastatin 40 milliGRAM(s) Oral at bedtime      VITALS:  T(F): 97.3 (07-07-20 @ 16:01), Max: 98.4 (07-06-20 @ 23:39)  HR: 82 (07-07-20 @ 16:01)  BP: 98/62 (07-07-20 @ 16:01)  RR: 18 (07-07-20 @ 16:01)  SpO2: 98% (07-07-20 @ 16:01)  Wt(kg): --    07-07 @ 07:01  -  07-07 @ 16:26  --------------------------------------------------------  IN: 400 mL / OUT: 1500 mL / NET: -1100 mL        PHYSICAL EXAM:  Constitutional: NAD  HEENT: anicteric sclera, oropharynx clear.  Neck: No JVD  Respiratory: CTAB, no wheezes, rales or rhonchi. LT sided chest tube  Cardiovascular: S1, S2, RRR  Gastrointestinal: BS+, soft, NT/ND  Extremities: No peripheral edema  Neurological: A/O x 3, no focal deficits  Skin: No rashes  Vascular Access: AV access canulated    LABS:  07-07    132<L>  |  91<L>  |  81<H>  ----------------------------<  90  4.5   |  25  |  10.00<H>    Ca    9.0      07 Jul 2020 07:05    TPro  6.7  /  Alb  2.8<L>  /  TBili  0.6  /  DBili      /  AST  14  /  ALT  18  /  AlkPhos  61  07-07    Creatinine Trend: 10.00 <--, 8.66 <--, 7.39 <--, 9.27 <--, 8.17 <--, 10.70 <--, 9.21 <--                        12.4   8.12  )-----------( 196      ( 07 Jul 2020 07:05 )             40.7     Urine Studies:      RADIOLOGY & ADDITIONAL STUDIES:

## 2020-07-07 NOTE — CONSULT NOTE ADULT - NSHPATTENDINGPLANDISCUSS_GEN_ALL_CORE
Dr Montano
called Dr Christy to discuss case, no response yet. left message and sign out to house officer
Primary team, and SW

## 2020-07-07 NOTE — PROGRESS NOTE ADULT - ATTENDING COMMENTS
Rudy Mendiola MD  [facility]  Office [phone]  Ans Serv [phone] cell [phone]

## 2020-07-07 NOTE — CONSULT NOTE ADULT - SUBJECTIVE AND OBJECTIVE BOX
69M with CAD s/p multiple stents, s/p CABG (9/2019 course c/b Afib and LT pleural effusion), Diastolic CHF, HTN, HLD, DM2, Afib on coumadin and ESRD on HD (Tu, Thurs, Sat at [facility]), hyperthyroidism, presented to ER for pain and swelling of left leg since 3 days. Patient reports sustaining injury to posterior aspect of left leg 1 month ago when he accidentally hit his leg against his bed. He reports the wound has not healed since. Reports he developed pain and swelling of left leg since 3 days, 9/10 in severity, with difficulty ambulating. Reports he was advised to get doppler which he reviewed with [facility] cardiologist today, was told he has no clot and recommended to came to ER. Denied fever, chills, weakness, cough, dysuria, NVDc.  Pt started on abx for cellulitis of left leg.  On 6/30 pt underwent thoracentesis for pleural effusion.  Subsequent IR placement of left pigtail pleural catheter on 7/1/20.  Pt HD Tues Th Sat, for approximately past week noted left arm circumferential swelling. Duplex LUE negative for DVT 6-30-20.  Called today for worsening of left arm swelling over past week and c/o pain in left arm.  No paresthesias. Pt denies weakness of LUE.  Successful HD today.    ICU Vital Signs Last 24 Hrs  T(C): 36.3 (07 Jul 2020 16:01), Max: 36.9 (06 Jul 2020 23:39)  T(F): 97.3 (07 Jul 2020 16:01), Max: 98.4 (06 Jul 2020 23:39)  HR: 82 (07 Jul 2020 16:01) (61 - 89)  BP: 98/62 (07 Jul 2020 16:01) (98/62 - 115/66)  BP(mean): --  ABP: --  ABP(mean): --  RR: 18 (07 Jul 2020 16:01) (18 - 20)  SpO2: 98% (07 Jul 2020 16:01) (95% - 100%)    Alert nad  Left arm: upper arm avf, dressing in place cdi. +thrill  circumferential swelling, no erythema, NT, no active bleed noted. sensation intact to distal extremity. palpable radial pulse. FROm Left hand and fingers.                          12.4   8.12  )-----------( 196      ( 07 Jul 2020 07:05 )             40.7   07-07    132<L>  |  91<L>  |  81<H>  ----------------------------<  90  4.5   |  25  |  10.00<H>    Ca    9.0      07 Jul 2020 07:05    TPro  6.7  /  Alb  2.8<L>  /  TBili  0.6  /  DBili  x   /  AST  14  /  ALT  18  /  AlkPhos  61  07-07    Prothrombin Time and INR, Plasma in AM (07.07.20 @ 07:05)    Prothrombin Time, Plasma: 17.7: Effective June 29, 2020 the reference range for PT has changed. sec    INR: 1.54: Recommended ranges for therapeutic INR:    2.0-3.0 for most medical and surgical thromboembolic states    2.0-3.0 for atrial fibrillation    2.0-3.0 for bileaflet mechanical valve in aortic position    2.5-3.5 for mechanical heart valves    Chest 2004;126:[id number]  The presence of direct thrombin inhibitors (argatroban, refludan)  may falsely increase results. ratio      < from: US Duplex Venous Upper Ext Ltd, Left (06.30.20 @ 19:01) >    TECHNIQUE: Duplex sonography of the LEFT UPPER extremity veins with color and spectral Doppler, with and without compression.      FINDINGS:    The left internal jugular, subclavian, axillary and brachial veins are patent and compressible where applicable.  The basilic and cephalic veins (superficial veins) are patent and without thrombus. The radial vein also is patent. The ulnar vein is not demonstrated.    Doppler examination shows normal spontaneous and phasic flow.    IMPRESSION:   No evidence of left upper extremity deep venous thrombosis.      < end of copied text >

## 2020-07-07 NOTE — CONSULT NOTE ADULT - PROVIDER SPECIALTY LIST ADULT
Cardiology
Infectious Disease
Pulmonology
Vascular Surgery
Nephrology
Thoracic Surgery
Pain Medicine
Palliative Care

## 2020-07-08 LAB
ANION GAP SERPL CALC-SCNC: 11 MMOL/L — SIGNIFICANT CHANGE UP (ref 5–17)
APTT BLD: 135.4 SEC — CRITICAL HIGH (ref 27.5–35.5)
APTT BLD: 33.4 SEC — SIGNIFICANT CHANGE UP (ref 27.5–35.5)
BUN SERPL-MCNC: 59 MG/DL — HIGH (ref 7–18)
CALCIUM SERPL-MCNC: 8.8 MG/DL — SIGNIFICANT CHANGE UP (ref 8.4–10.5)
CHLORIDE SERPL-SCNC: 97 MMOL/L — SIGNIFICANT CHANGE UP (ref 96–108)
CO2 SERPL-SCNC: 28 MMOL/L — SIGNIFICANT CHANGE UP (ref 22–31)
CREAT SERPL-MCNC: 7.98 MG/DL — HIGH (ref 0.5–1.3)
GLUCOSE BLDC GLUCOMTR-MCNC: 129 MG/DL — HIGH (ref 70–99)
GLUCOSE BLDC GLUCOMTR-MCNC: 141 MG/DL — HIGH (ref 70–99)
GLUCOSE BLDC GLUCOMTR-MCNC: 170 MG/DL — HIGH (ref 70–99)
GLUCOSE BLDC GLUCOMTR-MCNC: 96 MG/DL — SIGNIFICANT CHANGE UP (ref 70–99)
GLUCOSE SERPL-MCNC: 107 MG/DL — HIGH (ref 70–99)
HCT VFR BLD CALC: 39.6 % — SIGNIFICANT CHANGE UP (ref 39–50)
HCT VFR BLD CALC: 39.7 % — SIGNIFICANT CHANGE UP (ref 39–50)
HGB BLD-MCNC: 11.9 G/DL — LOW (ref 13–17)
HGB BLD-MCNC: 12.1 G/DL — LOW (ref 13–17)
INR BLD: 1.5 RATIO — HIGH (ref 0.88–1.16)
MCHC RBC-ENTMCNC: 28.7 PG — SIGNIFICANT CHANGE UP (ref 27–34)
MCHC RBC-ENTMCNC: 28.8 PG — SIGNIFICANT CHANGE UP (ref 27–34)
MCHC RBC-ENTMCNC: 30 GM/DL — LOW (ref 32–36)
MCHC RBC-ENTMCNC: 30.6 GM/DL — LOW (ref 32–36)
MCV RBC AUTO: 94.3 FL — SIGNIFICANT CHANGE UP (ref 80–100)
MCV RBC AUTO: 95.9 FL — SIGNIFICANT CHANGE UP (ref 80–100)
NRBC # BLD: 0 /100 WBCS — SIGNIFICANT CHANGE UP (ref 0–0)
NRBC # BLD: 0 /100 WBCS — SIGNIFICANT CHANGE UP (ref 0–0)
PLATELET # BLD AUTO: 173 K/UL — SIGNIFICANT CHANGE UP (ref 150–400)
PLATELET # BLD AUTO: 175 K/UL — SIGNIFICANT CHANGE UP (ref 150–400)
POTASSIUM SERPL-MCNC: 4.5 MMOL/L — SIGNIFICANT CHANGE UP (ref 3.5–5.3)
POTASSIUM SERPL-SCNC: 4.5 MMOL/L — SIGNIFICANT CHANGE UP (ref 3.5–5.3)
PROTHROM AB SERPL-ACNC: 17.2 SEC — HIGH (ref 10.6–13.6)
RBC # BLD: 4.14 M/UL — LOW (ref 4.2–5.8)
RBC # BLD: 4.2 M/UL — SIGNIFICANT CHANGE UP (ref 4.2–5.8)
RBC # FLD: 15.9 % — HIGH (ref 10.3–14.5)
RBC # FLD: 15.9 % — HIGH (ref 10.3–14.5)
SODIUM SERPL-SCNC: 136 MMOL/L — SIGNIFICANT CHANGE UP (ref 135–145)
WBC # BLD: 7.68 K/UL — SIGNIFICANT CHANGE UP (ref 3.8–10.5)
WBC # BLD: 7.72 K/UL — SIGNIFICANT CHANGE UP (ref 3.8–10.5)
WBC # FLD AUTO: 7.68 K/UL — SIGNIFICANT CHANGE UP (ref 3.8–10.5)
WBC # FLD AUTO: 7.72 K/UL — SIGNIFICANT CHANGE UP (ref 3.8–10.5)

## 2020-07-08 PROCEDURE — 99232 SBSQ HOSP IP/OBS MODERATE 35: CPT

## 2020-07-08 PROCEDURE — 71045 X-RAY EXAM CHEST 1 VIEW: CPT | Mod: 26

## 2020-07-08 PROCEDURE — 71045 X-RAY EXAM CHEST 1 VIEW: CPT | Mod: 26,77

## 2020-07-08 RX ORDER — HYDROMORPHONE HYDROCHLORIDE 2 MG/ML
0.5 INJECTION INTRAMUSCULAR; INTRAVENOUS; SUBCUTANEOUS ONCE
Refills: 0 | Status: DISCONTINUED | OUTPATIENT
Start: 2020-07-08 | End: 2020-07-08

## 2020-07-08 RX ORDER — HEPARIN SODIUM 5000 [USP'U]/ML
6500 INJECTION INTRAVENOUS; SUBCUTANEOUS EVERY 6 HOURS
Refills: 0 | Status: DISCONTINUED | OUTPATIENT
Start: 2020-07-08 | End: 2020-07-11

## 2020-07-08 RX ORDER — ACETAMINOPHEN 500 MG
1000 TABLET ORAL ONCE
Refills: 0 | Status: DISCONTINUED | OUTPATIENT
Start: 2020-07-08 | End: 2020-07-11

## 2020-07-08 RX ORDER — HEPARIN SODIUM 5000 [USP'U]/ML
6500 INJECTION INTRAVENOUS; SUBCUTANEOUS ONCE
Refills: 0 | Status: COMPLETED | OUTPATIENT
Start: 2020-07-08 | End: 2020-07-08

## 2020-07-08 RX ORDER — TRAMADOL HYDROCHLORIDE 50 MG/1
50 TABLET ORAL EVERY 6 HOURS
Refills: 0 | Status: DISCONTINUED | OUTPATIENT
Start: 2020-07-08 | End: 2020-07-15

## 2020-07-08 RX ORDER — HEPARIN SODIUM 5000 [USP'U]/ML
3000 INJECTION INTRAVENOUS; SUBCUTANEOUS EVERY 6 HOURS
Refills: 0 | Status: DISCONTINUED | OUTPATIENT
Start: 2020-07-08 | End: 2020-07-11

## 2020-07-08 RX ORDER — WARFARIN SODIUM 2.5 MG/1
5 TABLET ORAL ONCE
Refills: 0 | Status: DISCONTINUED | OUTPATIENT
Start: 2020-07-08 | End: 2020-07-08

## 2020-07-08 RX ORDER — VANCOMYCIN HCL 1 G
1000 VIAL (EA) INTRAVENOUS
Refills: 0 | Status: DISCONTINUED | OUTPATIENT
Start: 2020-07-09 | End: 2020-07-15

## 2020-07-08 RX ORDER — HEPARIN SODIUM 5000 [USP'U]/ML
INJECTION INTRAVENOUS; SUBCUTANEOUS
Qty: 25000 | Refills: 0 | Status: DISCONTINUED | OUTPATIENT
Start: 2020-07-08 | End: 2020-07-11

## 2020-07-08 RX ORDER — VANCOMYCIN HCL 1 G
1250 VIAL (EA) INTRAVENOUS
Refills: 0 | Status: DISCONTINUED | OUTPATIENT
Start: 2020-07-08 | End: 2020-07-08

## 2020-07-08 RX ORDER — HYDROMORPHONE HYDROCHLORIDE 2 MG/ML
2 INJECTION INTRAMUSCULAR; INTRAVENOUS; SUBCUTANEOUS EVERY 6 HOURS
Refills: 0 | Status: DISCONTINUED | OUTPATIENT
Start: 2020-07-08 | End: 2020-07-08

## 2020-07-08 RX ORDER — VANCOMYCIN HCL 1 G
1250 VIAL (EA) INTRAVENOUS ONCE
Refills: 0 | Status: COMPLETED | OUTPATIENT
Start: 2020-07-08 | End: 2020-07-08

## 2020-07-08 RX ORDER — WARFARIN SODIUM 2.5 MG/1
5 TABLET ORAL AT BEDTIME
Refills: 0 | Status: COMPLETED | OUTPATIENT
Start: 2020-07-08 | End: 2020-07-08

## 2020-07-08 RX ADMIN — HEPARIN SODIUM 6500 UNIT(S): 5000 INJECTION INTRAVENOUS; SUBCUTANEOUS at 15:18

## 2020-07-08 RX ADMIN — SIMVASTATIN 40 MILLIGRAM(S): 20 TABLET, FILM COATED ORAL at 21:49

## 2020-07-08 RX ADMIN — Medication 166.67 MILLIGRAM(S): at 21:49

## 2020-07-08 RX ADMIN — WARFARIN SODIUM 5 MILLIGRAM(S): 2.5 TABLET ORAL at 21:49

## 2020-07-08 RX ADMIN — Medication 667 MILLIGRAM(S): at 07:55

## 2020-07-08 RX ADMIN — SENNA PLUS 2 TABLET(S): 8.6 TABLET ORAL at 21:49

## 2020-07-08 RX ADMIN — KETOTIFEN FUMARATE 1 DROP(S): 0.34 SOLUTION OPHTHALMIC at 06:05

## 2020-07-08 RX ADMIN — Medication 81 MILLIGRAM(S): at 11:28

## 2020-07-08 RX ADMIN — AMIODARONE HYDROCHLORIDE 200 MILLIGRAM(S): 400 TABLET ORAL at 06:05

## 2020-07-08 RX ADMIN — HEPARIN SODIUM 0 UNIT(S)/HR: 5000 INJECTION INTRAVENOUS; SUBCUTANEOUS at 21:30

## 2020-07-08 RX ADMIN — Medication 667 MILLIGRAM(S): at 11:29

## 2020-07-08 RX ADMIN — KETOTIFEN FUMARATE 1 DROP(S): 0.34 SOLUTION OPHTHALMIC at 17:28

## 2020-07-08 RX ADMIN — Medication 1: at 11:27

## 2020-07-08 RX ADMIN — HEPARIN SODIUM 1500 UNIT(S)/HR: 5000 INJECTION INTRAVENOUS; SUBCUTANEOUS at 15:17

## 2020-07-08 RX ADMIN — Medication 667 MILLIGRAM(S): at 17:23

## 2020-07-08 RX ADMIN — PANTOPRAZOLE SODIUM 40 MILLIGRAM(S): 20 TABLET, DELAYED RELEASE ORAL at 06:05

## 2020-07-08 RX ADMIN — CHLORHEXIDINE GLUCONATE 1 APPLICATION(S): 213 SOLUTION TOPICAL at 11:28

## 2020-07-08 RX ADMIN — HEPARIN SODIUM 1200 UNIT(S)/HR: 5000 INJECTION INTRAVENOUS; SUBCUTANEOUS at 22:35

## 2020-07-08 NOTE — PROGRESS NOTE ADULT - ASSESSMENT
s/p IR L pigtail placement on 7/2 for L pleural effusion, decreased chest tube output, s/p TPA     1. F/u AM CXR   2. Plan to dc pigtail after AM CXR   3. Continue to hold anticoagulation   4. Care as per med team

## 2020-07-08 NOTE — PROGRESS NOTE ADULT - PROBLEM SELECTOR PLAN 2
periods of confusions slowly improving, likely due to hospital delirium   CT head with chronic changes   normalize sleep wake cycle, frequent reorientation   avoid delirium provoking agents   pt with similar episodes during prior hospitalization as per son, pt may benefit from increased hrs of family presence at bedside

## 2020-07-08 NOTE — PROGRESS NOTE ADULT - PROBLEM SELECTOR PLAN 9
-pt's HCP is harvinder Hardin [phone]  -pt DNR/DNI HCP is harvinder Randal Jamieramiro [phone]   DNR/DNI

## 2020-07-08 NOTE — PROGRESS NOTE ADULT - PROBLEM SELECTOR PLAN 3
left arm with edema and tenderness   afebrile, no leukocytosis   completed Vanco   LUE and LE dopplers negative for DVT  ID Dr. Rayo following  Vascular following. rec-CT r/o central stenosis and OP f/u with dr. Dr. Goncalves left arm with edema and tenderness   afebrile, no leukocytosis   completed Vanco   LUE and LE dopplers negative for DVT  ID Dr. Rayo following  Vascular following. rec-OP f/u with dr. Dr. Goncalves for fistulagram.

## 2020-07-08 NOTE — PROGRESS NOTE ADULT - PROBLEM SELECTOR PLAN 1
Pt with left lower leg pain which is somatic and neuropathic in nature due to cellulitis. Vancomycin completed 6/30. Pt s/p left thoracentesis 6/30, s/p IR L pigtail placement on 7/2 for L pleural effusion.   High risk medications reviewed. Avoid polypharmacy. Avoid IV opioids. Avoid NSAIDs and benzodiazepines. Non-opioid medications and non-pharmacological pain management measures initiated.   Opioid pain recommendations   - Maximize non-opioid pain recommendations. Per pt's family, pt is sensitive to opioids, experiences delirium and/or aggression.   -  Will trial tramadol 50mg po prn severe pain if needed   Non-opioid pain recommendations   - Consider Acetaminophen 650mg PO q 6 hours PRN moderate pain.   - Avoid NSAIDs due to ESRD  Bowel Regimen  - Continue Senna 2 tablets at bedtime for constipation  Mild pain   - Non-pharmacological pain treatment recommendations  - Warm/ Cool packs PRN   - Repositioning extremity, elevation, imagery, relaxation, distraction.  - Physical therapy OOB if no contraindications   Recommendations discussed with primary team and RN

## 2020-07-08 NOTE — PROGRESS NOTE ADULT - SUBJECTIVE AND OBJECTIVE BOX
Time of Visit:  Patient seen and examined.     MEDICATIONS  (STANDING):  alteplase  Injectable for Pleural Effusion 2 milliGRAM(s) IntraPleural. once  aMIOdarone    Tablet 200 milliGRAM(s) Oral daily  aspirin enteric coated 81 milliGRAM(s) Oral daily  calcium acetate 667 milliGRAM(s) Oral three times a day with meals  chlorhexidine 2% Cloths 1 Application(s) Topical daily  dextrose 5%. 1000 milliLiter(s) (50 mL/Hr) IV Continuous <Continuous>  dornase danilo Solution for Pleural Effusion 5 milliGRAM(s) IntraPleural. once  dornase danilo Solution for Pleural Effusion 5 milliGRAM(s) IntraPleural. once  heparin  Infusion.  Unit(s)/Hr (15 mL/Hr) IV Continuous <Continuous>  insulin lispro (HumaLOG) corrective regimen sliding scale   SubCutaneous three times a day before meals  insulin lispro (HumaLOG) corrective regimen sliding scale   SubCutaneous at bedtime  ketotifen 0.025% Ophthalmic Solution 1 Drop(s) Both EYES two times a day  methimazole 5 milliGRAM(s) Oral daily  metoprolol tartrate 100 milliGRAM(s) Oral two times a day  pantoprazole    Tablet 40 milliGRAM(s) Oral before breakfast  senna 2 Tablet(s) Oral at bedtime  simvastatin 40 milliGRAM(s) Oral at bedtime  warfarin 5 milliGRAM(s) Oral at bedtime      MEDICATIONS  (PRN):  acetaminophen  IVPB .. 1000 milliGRAM(s) IV Intermittent once PRN Moderate Pain (4 - 6)  heparin   Injectable 6500 Unit(s) IV Push every 6 hours PRN For aPTT less than 40  heparin   Injectable 3000 Unit(s) IV Push every 6 hours PRN For aPTT between 40 - 57  traMADol 50 milliGRAM(s) Oral every 6 hours PRN Severe Pain (7 - 10)       Medications up to date at time of exam.      PHYSICAL EXAMINATION:  Patient has no new complaints.  GENERAL: The patient is a well-developed, well-nourished, in no apparent distress.     Vital Signs Last 24 Hrs  T(C): 36.8 (08 Jul 2020 15:52), Max: 36.9 (08 Jul 2020 05:52)  T(F): 98.3 (08 Jul 2020 15:52), Max: 98.5 (08 Jul 2020 05:52)  HR: 87 (08 Jul 2020 15:52) (78 - 87)  BP: 109/61 (08 Jul 2020 15:52) (100/58 - 109/61)  BP(mean): --  RR: 22 (08 Jul 2020 15:52) (18 - 22)  SpO2: 96% (08 Jul 2020 15:52) (96% - 99%)   (if applicable)    Chest Tube (if applicable)    HEENT: Head is normocephalic and atraumatic. Extraocular muscles are intact. Mucous membranes are moist.     NECK: Supple, no palpable adenopathy.    LUNGS: Clear to auscultation, no wheezing, rales, or rhonchi.    HEART: Regular rate and rhythm without murmur.    ABDOMEN: Soft, nontender, and nondistended.  No hepatosplenomegaly is noted.    : No painful voiding, no pelvic pain    EXTREMITIES: Without any cyanosis, clubbing, rash, lesions or edema.    NEUROLOGIC: Awake, alert, oriented, grossly intact    SKIN: Warm, dry, good turgor.      LABS:                        12.1   7.68  )-----------( 175      ( 08 Jul 2020 06:17 )             39.6     07-08    136  |  97  |  59<H>  ----------------------------<  107<H>  4.5   |  28  |  7.98<H>    Ca    8.8      08 Jul 2020 06:17    TPro  6.7  /  Alb  2.8<L>  /  TBili  0.6  /  DBili  x   /  AST  14  /  ALT  18  /  AlkPhos  61  07-07    PT/INR - ( 08 Jul 2020 06:17 )   PT: 17.2 sec;   INR: 1.50 ratio         PTT - ( 08 Jul 2020 06:17 )  PTT:33.4 sec                    MICROBIOLOGY: (if applicable)    RADIOLOGY & ADDITIONAL STUDIES:  EKG:   CXR:< from: Xray Chest 1 View- PORTABLE-Urgent (07.08.20 @ 12:50) >    EXAM:  XR CHEST PORTABLE URGENT 1V                            PROCEDURE DATE:  07/08/2020          INTERPRETATION:    DATE OF STUDY: 7/8/2020 at 12:41PM    PRIOR: Earlier 7/8/20 exam.    CLINICAL INDICATION: S/P removal of left pigtail catheter.    TECHNIQUE: portable chest.    FINDINGS:   S/P sternotomy.  Left chest pigtail catheter has been removed.  Stable cardiomegaly.  Persistent left pleural effusion with stable lower left lung opacity.  No pneumothorax.    Right lung airspace opacities are stable.    IMPRESSION:   1. S/P removal of left pigtail catheter.  2. No pneumothorax.                LYNDON SÁNCHEZ M.D., ATTENDING RADIOLOGIST  This document has been electronically signed. Jul 8 2020  1:36PM                < end of copied text >    ECHO:    IMPRESSION: 69y Male PAST MEDICAL & SURGICAL HISTORY:  ESRD (end stage renal disease) on dialysis: T-Th-Sat  Stented coronary artery: total 15 stents from [id number]  Hyperthyroidism  H/O: CVA: after cardiac stent placed 12/15/09 -no residual  Heart Attack: 2/1/07 with subsequent stent  Coronary Stent: 9436-1327 10 stents,  to Ramus 1/2015, cath 01/2016 ( see results in HPI)  Hypercholesterolemia  CAD (Coronary Artery Disease)  DM (Diabetes Mellitus): x 4 yrs without N/N/R  HTN (Hypertension)  S/P CABG x 4  Hemodialysis access, AV graft: 5/2015, L arm  S/P cataract extraction  Boil of Buttock: 2006 and 2008   p/w           IMP: This is a  69 man  with CAD s/p multiple stents, s/p CABG (9/2019 course c/b Afib and LT pleural effusion), Diastolic CHF, HTN, HLD, DM2, Afib on coumadin and ESRD on HD (Tu, Thurs, Sat at [facility]), hyperthyroidism, presented to ER for pain and swelling of left leg since 3 days. Admitted to medicine for LLE cellulitis.  CXR shows gross heart enlargement and mild to moderate left base pleural pulmonary reaction again noted. Sternotomy again seen. Negative for DVT on Doppler study. Blood Cx NGTD. Pt was started on Unasyn and po Doxy, but changed to Vancomycin due to non healing cellulitis. ID consulted.  Improved leg swelling and erythema with Vanco.   Pt. with ESRD on HD, followed by nephro Dr. Calle, HD tolerated. Pt. with significant cardiac hx including AF on Coumadin, followed by cardiology. Coumadin dose adjusted for goal INR 2-3. ECHO resulted EF 40-45% (previous EF 55% in Jan. 2020.  CT chest reveal enlarging left loculated pleural effusion. Pat is not in any resp distress and O2 sat 100 % 2 LPM via NC. He will require chest tube placement but INR is elevated due to coumadin. Thoracic surgery unable to place chest tube, s/p thoracentesis 6/30 .. 150 ml . Post procedure cxr neg for ptx.  IR placed Left chest tube 7/2. repeated cxr, no improvement .       Sugg;  -chest tube d/c 7/8  -post chest tube removal cxr no PTX  -f/u pleural fluid cytology  -dialysis as per neph  -continue O2 via NC as needed  -cxr in AM    spoke with pat and wife at bedside

## 2020-07-08 NOTE — PROGRESS NOTE ADULT - ASSESSMENT
×If you receive an error when searching the  Registry clear your cache and log back in.  Using Chrome: Delete your browsing History.  Using Internet Explorer:  Clear your browsing data.   Patient Search   Multi-Patient Search   Reports   Drug Listing   Designation   My ROGERS Numbers  Data Detail Level: Printer-Friendly View Extended View  Confidential Drug Utilization Report  Search Terms: jannie hardin, 1950Search Date: 06/24/2020 15:17:19 PM  The Drug Utilization Report below displays all of the controlled substance prescriptions, if any, that your patient has filled in the last twelve months. The information displayed on this report is compiled from pharmacy submissions to the Department, and accurately reflects the information as submitted by the pharmacies.    This report was requested by: French Pabon | Reference #: [id number]    Others' Prescriptions  Patient Name: Jannie HradinBirth Date: 1950  Address: [address] [place] [id number]: Male  Rx Written	Rx Dispensed	Drug	Quantity	Days Supply	Prescriber Name	Payment Method	Dispenser  10/06/2019	10/06/2019	oxycodone hcl 5 mg tablet	20	5	Bernardo Gary Jo	Medicaid	[facility]  * - Drugs marked with an asterisk are compound drugs. If the compound drug is made up of more than one controlled substance, then each controlled

## 2020-07-08 NOTE — PROGRESS NOTE ADULT - PROBLEM SELECTOR PLAN 5
not in exacerbation   Echo with HFrEF of 40-45%, moderate global left ventricular systolic dysfunction and decreased RV systolic function with moderate pulmonary hypertension   negative stress test   C/w  ASA, statin and BB   followed by cardio Dr. Colin

## 2020-07-08 NOTE — PROGRESS NOTE ADULT - ASSESSMENT
Patient is a 69y Male whom presented to the hospital with LT leg pain and swelling.  Was sent from cardiology clinic where he had gone for routine evaluation as was noted  to have RT leg swelling, to R/O DVT. ( doppler US negative for DVT).admitted for treatment of leg cellulitis   Renal consulted for ESRD. Large left loculated pleural effusion s/p L pigtail catheter.     A/P  #ESRD  HD tomorrow.. Otherwise electrolytes acceptable. LUE swelling noted, no issues cannulating AV access. will need angioplasty as outpatient  # hyponatremia . In a patient with hypervolemia and lung pathology. improved  fluid restriction 1.2 litres/day.  RPT BMP daily   # Anemia of CKD. stable off RSOARIO  s/p removal of chest tube

## 2020-07-08 NOTE — PROGRESS NOTE ADULT - PROBLEM SELECTOR PLAN 10
-discharge disposition home pending clinical improvement, s/p pigtail removal 7/8. discharge disposition home pending clinical improvement, s/p pigtail removal 7/8.

## 2020-07-08 NOTE — PROGRESS NOTE ADULT - PROBLEM SELECTOR PLAN 1
s/p pigtail insertion by IR on 7/2  s/p TPA on 7/7   Removed CT pig tail 7/8  will resume Heparin and Coumadin as per CT surgery  Followed by Pulmachelle Delaney   Thoracic surgery following  post pigtail removal Xray okay

## 2020-07-08 NOTE — PROGRESS NOTE ADULT - SUBJECTIVE AND OBJECTIVE BOX
INTERVAL HPI/OVERNIGHT EVENTS:    Pt seen and examined at bedside. No acute complaints at this time     Vital Signs Last 24 Hrs  T(C): 36.6 (08 Jul 2020 08:08), Max: 36.9 (07 Jul 2020 09:40)  T(F): 97.9 (08 Jul 2020 08:08), Max: 98.5 (08 Jul 2020 05:52)  HR: 79 (08 Jul 2020 08:08) (61 - 82)  BP: 103/55 (08 Jul 2020 08:08) (98/62 - 108/56)  BP(mean): --  RR: 20 (08 Jul 2020 08:08) (18 - 20)  SpO2: 96% (08 Jul 2020 08:08) (96% - 100%)  I&O's Detail    07 Jul 2020 07:01  -  08 Jul 2020 07:00  --------------------------------------------------------  IN:    Other: 400 mL  Total IN: 400 mL    OUT:    Chest Tube: 145 mL    Other: 1500 mL  Total OUT: 1645 mL    Total NET: -1245 mL        pantoprazole    Tablet 40 milliGRAM(s) Oral before breakfast  senna 2 Tablet(s) Oral at bedtime      Physical Exam  General: No acute distress  Chest: left pigtail in place, to suction, -AL, output ss, dressing c/d/i, no crepitus       Labs:                        12.1   7.68  )-----------( 175      ( 08 Jul 2020 06:17 )             39.6     07-08    136  |  97  |  59<H>  ----------------------------<  107<H>  4.5   |  28  |  7.98<H>    Ca    8.8      08 Jul 2020 06:17    TPro  6.7  /  Alb  2.8<L>  /  TBili  0.6  /  DBili  x   /  AST  14  /  ALT  18  /  AlkPhos  61  07-07    PT/INR - ( 08 Jul 2020 06:17 )   PT: 17.2 sec;   INR: 1.50 ratio         PTT - ( 08 Jul 2020 06:17 )  PTT:33.4 sec

## 2020-07-08 NOTE — PROGRESS NOTE ADULT - SUBJECTIVE AND OBJECTIVE BOX
Patient denies CP,SOB Review of systems otherwise (-)    acetaminophen  IVPB .. 1000 milliGRAM(s) IV Intermittent once PRN  alteplase  Injectable for Pleural Effusion 2 milliGRAM(s) IntraPleural. once  aMIOdarone    Tablet 200 milliGRAM(s) Oral daily  aspirin enteric coated 81 milliGRAM(s) Oral daily  calcium acetate 667 milliGRAM(s) Oral three times a day with meals  chlorhexidine 2% Cloths 1 Application(s) Topical daily  dextrose 5%. 1000 milliLiter(s) IV Continuous <Continuous>  dornase danilo Solution for Pleural Effusion 5 milliGRAM(s) IntraPleural. once  dornase danilo Solution for Pleural Effusion 5 milliGRAM(s) IntraPleural. once  heparin   Injectable 6500 Unit(s) IV Push every 6 hours PRN  heparin   Injectable 3000 Unit(s) IV Push every 6 hours PRN  heparin  Infusion.  Unit(s)/Hr IV Continuous <Continuous>  HYDROmorphone   Tablet 2 milliGRAM(s) Oral every 6 hours PRN  insulin lispro (HumaLOG) corrective regimen sliding scale   SubCutaneous three times a day before meals  insulin lispro (HumaLOG) corrective regimen sliding scale   SubCutaneous at bedtime  ketotifen 0.025% Ophthalmic Solution 1 Drop(s) Both EYES two times a day  methimazole 5 milliGRAM(s) Oral daily  metoprolol tartrate 100 milliGRAM(s) Oral two times a day  pantoprazole    Tablet 40 milliGRAM(s) Oral before breakfast  senna 2 Tablet(s) Oral at bedtime  simvastatin 40 milliGRAM(s) Oral at bedtime  warfarin 5 milliGRAM(s) Oral at bedtime                            12.1   7.68  )-----------( 175      ( 08 Jul 2020 06:17 )             39.6       Hemoglobin: 12.1 g/dL (07-08 @ 06:17)  Hemoglobin: 12.4 g/dL (07-07 @ 07:05)  Hemoglobin: 12.4 g/dL (07-06 @ 06:33)  Hemoglobin: 11.9 g/dL (07-05 @ 06:40)  Hemoglobin: 12.2 g/dL (07-04 @ 05:25)      07-08    136  |  97  |  59<H>  ----------------------------<  107<H>  4.5   |  28  |  7.98<H>    Ca    8.8      08 Jul 2020 06:17    TPro  6.7  /  Alb  2.8<L>  /  TBili  0.6  /  DBili  x   /  AST  14  /  ALT  18  /  AlkPhos  61  07-07    Creatinine Trend: 7.98<--, 10.00<--, 8.66<--, 7.39<--, 9.27<--, 8.17<--    COAGS: PT/INR - ( 08 Jul 2020 06:17 )   PT: 17.2 sec;   INR: 1.50 ratio         PTT - ( 08 Jul 2020 06:17 )  PTT:33.4 sec          T(C): 36.8 (07-08-20 @ 15:52), Max: 36.9 (07-08-20 @ 05:52)  HR: 87 (07-08-20 @ 15:52) (78 - 87)  BP: 109/61 (07-08-20 @ 15:52) (100/58 - 109/61)  RR: 22 (07-08-20 @ 15:52) (18 - 22)  SpO2: 96% (07-08-20 @ 15:52) (96% - 99%)  Wt(kg): --    I&O's Summary    07 Jul 2020 07:01  -  08 Jul 2020 07:00  --------------------------------------------------------  IN: 400 mL / OUT: 1645 mL / NET: -1245 mL        Gen: Appears well in NAD  HEENT:  (-)icterus (-)pallor  CV: N S1 S2 1/6 DEJAH (+)2 Pulses B/l  Resp:  Clear to ausculatation B/L, normal effort  GI: (+) BS Soft, NT, ND  Lymph:  (-)lower extremity edema (+) left upper extremity swelling, (-)obvious lymphadenopathy  Skin: Warm to touch, Normal turgor  Psych: Appropriate mood and affect        ASSESSMENT/PLAN: 69y Male  CAD s/p multiple stents, s/p CABG (9/2019 course c/b Afib and LT pleural effusion), Normal LV systolic function, severe Diastolic, RV dysfx CHF, HTN, HLD, DM2, Afib on coumadin and ESRD on HD (Tu, Thurs, Sat at [facility]), hyperthyroidism, presented to ER for pain and swelling of left leg since 3 days.    - LE dopplers negative for dVT   - s/p pigtail drain of left pleural effusion, s/p TPA 7/7  - Chest tube management per thoracic  - Restarted on heparin gtt and coumadin, goal INR 2-3  - continue with medical management of known CAD with stents with sapt   - Vascular eval noted, oupt f/u with Christiano Reynolds MD, [facility]  BEEPER [phone]

## 2020-07-08 NOTE — CHART NOTE - NSCHARTNOTEFT_GEN_A_CORE
Spoke to harvinder Tee [phone]  Vital Signs Last 24 Hrs  T(C): 36.8 (08 Jul 2020 15:52), Max: 36.9 (08 Jul 2020 05:52)  T(F): 98.3 (08 Jul 2020 15:52), Max: 98.5 (08 Jul 2020 05:52)  HR: 87 (08 Jul 2020 15:52) (78 - 87)  BP: 109/61 (08 Jul 2020 15:52) (100/58 - 109/61)  BP(mean): --  RR: 22 (08 Jul 2020 15:52) (18 - 22)  SpO2: 96% (08 Jul 2020 15:52) (96% - 99%) Spoke to son Randal [phone] who states his dad is not confused when he is at home, pt also speaks the language Tamil (Finnish & Zambian) for which there is no interpretation. Requested to have PT consult so he would know where the father is physically, this was done on 6/26/20, anticipated Discharge Recommendations home w/ home PT X 2 wks. Son updated on plan of care, questions answered, emotional support given.  Vital Signs Last 24 Hrs  T(C): 36.8 (08 Jul 2020 15:52), Max: 36.9 (08 Jul 2020 05:52)  T(F): 98.3 (08 Jul 2020 15:52), Max: 98.5 (08 Jul 2020 05:52)  HR: 87 (08 Jul 2020 15:52) (78 - 87)  BP: 109/61 (08 Jul 2020 15:52) (100/58 - 109/61)  BP(mean): --  RR: 22 (08 Jul 2020 15:52) (18 - 22)  SpO2: 96% (08 Jul 2020 15:52) (96% - 99%)\

## 2020-07-08 NOTE — PROGRESS NOTE ADULT - ASSESSMENT
Left arm Cellulitis    Plan - Start Vancomycin 1250 mgs iv x 1 stat then Vancomycin 1 gm iv T-T-S with dialysis.

## 2020-07-08 NOTE — PROGRESS NOTE ADULT - ATTENDING COMMENTS
Rudy Mendiola MD  [facility]  Office [phone]  Ans Serv [phone] cell - [phone]

## 2020-07-08 NOTE — PROGRESS NOTE ADULT - SUBJECTIVE AND OBJECTIVE BOX
Source of information: , Chart review  Patient language: English  : n/a    CC:  left chest wall pain    This is a 69yMale patient who presents to the hospital for Patient is a 69y old  Male who presents with a chief complaint of leg pain (08 Jul 2020 16:42).  Pt s/p left pigtail removal.  Pt was complaining at pain at site.  Pt was given iv hydromorphone 0.5mg ivp once and tolerated it.  Pt with no nausea, vomiting. Pt denies pain at this time. HD scheduled tomorrow.     PAIN SCORE:    5/10     SCALE USED: (1-10 NRS)      PAST MEDICAL & SURGICAL HISTORY:  ESRD (end stage renal disease) on dialysis: T-Th-Sat  Stented coronary artery: total 15 stents from 2366-6954  Hyperthyroidism  H/O: CVA: after cardiac stent placed 12/15/09 -no residual  Heart Attack: 2/1/07 with subsequent stent  Coronary Stent: 1724-4032 10 stents,  to Ramus 1/2015, cath 01/2016 ( see results in HPI)  Hypercholesterolemia  CAD (Coronary Artery Disease)  DM (Diabetes Mellitus): x 4 yrs without N/N/R  HTN (Hypertension)  S/P CABG x 4  Hemodialysis access, AV graft: 5/2015, L arm  S/P cataract extraction  Boil of Buttock: 2006 and 2008      FAMILY HISTORY:  Family history of coronary artery disease  Family history of diabetes mellitus (Sibling)        SOCIAL HISTORY:  [x ] Denies Smoking, Alcohol, or Drug Use    Allergies    lisinopril (Other)  opioid-like analgesics (Other)    Intolerances        MEDICATIONS:    MEDICATIONS  (STANDING):  alteplase  Injectable for Pleural Effusion 2 milliGRAM(s) IntraPleural. once  aMIOdarone    Tablet 200 milliGRAM(s) Oral daily  aspirin enteric coated 81 milliGRAM(s) Oral daily  calcium acetate 667 milliGRAM(s) Oral three times a day with meals  chlorhexidine 2% Cloths 1 Application(s) Topical daily  dextrose 5%. 1000 milliLiter(s) (50 mL/Hr) IV Continuous <Continuous>  dornase danilo Solution for Pleural Effusion 5 milliGRAM(s) IntraPleural. once  dornase danilo Solution for Pleural Effusion 5 milliGRAM(s) IntraPleural. once  heparin  Infusion.  Unit(s)/Hr (15 mL/Hr) IV Continuous <Continuous>  insulin lispro (HumaLOG) corrective regimen sliding scale   SubCutaneous three times a day before meals  insulin lispro (HumaLOG) corrective regimen sliding scale   SubCutaneous at bedtime  ketotifen 0.025% Ophthalmic Solution 1 Drop(s) Both EYES two times a day  methimazole 5 milliGRAM(s) Oral daily  metoprolol tartrate 100 milliGRAM(s) Oral two times a day  pantoprazole    Tablet 40 milliGRAM(s) Oral before breakfast  senna 2 Tablet(s) Oral at bedtime  simvastatin 40 milliGRAM(s) Oral at bedtime  warfarin 5 milliGRAM(s) Oral at bedtime    MEDICATIONS  (PRN):  acetaminophen  IVPB .. 1000 milliGRAM(s) IV Intermittent once PRN Moderate Pain (4 - 6)  heparin   Injectable 6500 Unit(s) IV Push every 6 hours PRN For aPTT less than 40  heparin   Injectable 3000 Unit(s) IV Push every 6 hours PRN For aPTT between 40 - 57  HYDROmorphone   Tablet 2 milliGRAM(s) Oral every 6 hours PRN Severe Pain (7 - 10)      Vital Signs Last 24 Hrs  T(C): 36.8 (08 Jul 2020 15:52), Max: 36.9 (08 Jul 2020 05:52)  T(F): 98.3 (08 Jul 2020 15:52), Max: 98.5 (08 Jul 2020 05:52)  HR: 87 (08 Jul 2020 15:52) (78 - 87)  BP: 109/61 (08 Jul 2020 15:52) (100/58 - 109/61)  BP(mean): --  RR: 22 (08 Jul 2020 15:52) (18 - 22)  SpO2: 96% (08 Jul 2020 15:52) (96% - 99%)    LABS:                          12.1   7.68  )-----------( 175      ( 08 Jul 2020 06:17 )             39.6     07-08    136  |  97  |  59<H>  ----------------------------<  107<H>  4.5   |  28  |  7.98<H>    Ca    8.8      08 Jul 2020 06:17    TPro  6.7  /  Alb  2.8<L>  /  TBili  0.6  /  DBili  x   /  AST  14  /  ALT  18  /  AlkPhos  61  07-07    PT/INR - ( 08 Jul 2020 06:17 )   PT: 17.2 sec;   INR: 1.50 ratio         PTT - ( 08 Jul 2020 06:17 )  PTT:33.4 sec  LIVER FUNCTIONS - ( 07 Jul 2020 07:05 )  Alb: 2.8 g/dL / Pro: 6.7 g/dL / ALK PHOS: 61 U/L / ALT: 18 U/L DA / AST: 14 U/L / GGT: x             CAPILLARY BLOOD GLUCOSE      POCT Blood Glucose.: 170 mg/dL (08 Jul 2020 11:23)  POCT Blood Glucose.: 96 mg/dL (08 Jul 2020 07:45)  POCT Blood Glucose.: 128 mg/dL (07 Jul 2020 21:08)      Radiology:  < from: Xray Chest 1 View- PORTABLE-Urgent (07.08.20 @ 12:50) >    EXAM:  XR CHEST PORTABLE URGENT 1V                            PROCEDURE DATE:  07/08/2020          INTERPRETATION:    DATE OF STUDY: 7/8/2020 at 12:41PM    PRIOR: Earlier 7/8/20 exam.    CLINICAL INDICATION: S/P removal of left pigtail catheter.    TECHNIQUE: portable chest.    FINDINGS:   S/P sternotomy.  Left chest pigtail catheter has been removed.  Stable cardiomegaly.  Persistent left pleural effusion with stable lower left lung opacity.  No pneumothorax.    Right lung airspace opacities are stable.    IMPRESSION:   1. S/P removal of left pigtail catheter.  2. No pneumothorax.    < end of copied text >      Drug Screen:        REVIEW OF SYSTEMS:  CONSTITUTIONAL: No fever or fatigue  RESPIRATORY: no wheezing, no dyspnea  CARDIOVASCULAR: No chest pain, palpitations, dizziness, or leg swelling  GASTROINTESTINAL: No abdominal or epigastric pain. No nausea, vomiting; No diarrhea or constipation.   GENITOURINARY: No dysuria, frequency, hematuria, retention or incontinence  MUSCULOSKELETAL: + bilateral leg pain   NEURO: No headaches, No numbness/tingling b/l LE, No weakness  PSYCHIATRIC: No depression, anxiety, mood swings, or difficulty sleeping    PHYSICAL EXAM:  GENERAL:  Alert & Oriented X3, NAD, Good concentration  CHEST/LUNG: + decreased breath sounds + left pigtail site cdi  HEART: Regular rate and rhythm; No murmurs, rubs, or gallops  ABDOMEN: Distended  Bowel sounds present  EXTREMITIES:  2+ Peripheral Pulses, No cyanosis, or edema  MUSCULOSKELETAL: + decreased rom + bilateral leg pain   SKIN: No rashes or lesions    4AT (Assessment test for delirium & cognitive impairment)  _________________________________________________________  [1] ALERTNESS  This includes patients who may be markedly drowsy (eg. difficult to rouse and/or obviously sleepy  during assessment) or agitated/hyperactive. Observe the patient. If asleep, attempt to wake with  speech or gentle touch on shoulder. Ask the patient to state their name and address to assist rating.  Normal (fully alert, but not agitated, throughout assessment) 0  Mild sleepiness for <10 seconds after waking, then normal 0  Clearly abnormal 4    [2] AMT4  Age, date of birth, place (name of the hospital or building), current year.  No mistakes 0  1 mistake 1  2 or more mistakes/untestable 2    [3] ATTENTION  Ask the patient: “Please tell me the months of the year in backwards order, starting at December.”  To assist initial understanding one prompt of “what is the month before December?” is permitted.  Months of the year backwards Achieves 7 months or more correctly 0  Starts but scores <7 months / refuses to start 1 Untestable (cannot start because unwell, drowsy, inattentive) 2    [4] ACUTE CHANGE OR FLUCTUATING COURSE  Evidence of significant change or fluctuation in: alertness, cognition, other mental function  (eg. paranoia, hallucinations) arising over the last 2 weeks and still evident in last 24hrs  No 0  Yes 4    4 or above: possible delirium +/- cognitive impairment  1-3: possible cognitive impairment  0: delirium or severe cognitive impairment unlikely (but delirium still possible if [4] information incomplete)  4AT SCORE:1       Risk factors associated with adverse outcomes related to opioid treatment  [ ]  Concurrent benzodiazepine use  [ ]  History/ Active substance use or alcohol use disorder  [ ] Psychiatric co-morbidity  [ ] Sleep apnea  [ ] COPD  [ ] BMI> 35  [ ] Liver dysfunction  [x ] Renal dysfunction  [ ] CHF  [ ] Smoker  [x ]  Age > 60 years    [x ]  [place]  Reviewed and Copied to Chart. See below.    Plan of care and goal oriented pain management treatment options were discussed with patient and /or primary care giver; all questions and concerns were addressed and care was aligned with patient's wishes.    Educated patient on goal oriented pain management treatment options     07-08-20 @ 17:03

## 2020-07-08 NOTE — CHART NOTE - NSCHARTNOTEFT_GEN_A_CORE
Patient is a known patient of Dr. Goncalves and is seen at the access center. When stable for discharge recommend outpatient follow up with Dr. Goncalves for further evaluation.

## 2020-07-08 NOTE — PROGRESS NOTE ADULT - SUBJECTIVE AND OBJECTIVE BOX
NP Note discussed with  Primary Attending    Patient is a 69y old  Male who presents with a chief complaint of leg pain (08 Jul 2020 08:24)    HPI-A 69M with CAD s/p multiple stents, s/p CABG (9/2019 course c/b Afib and LT pleural effusion), Diastolic CHF, HTN, HLD, DM2, Afib on coumadin and ESRD on HD (Tu, Thurs, Sat at [facility]), hyperthyroidism, presented to ER for pain and swelling of left leg. Admitted to medicine for LLE cellulitis. CT chest found to have large L loculated pleural effusion,s/p thoracentesis draining 150cc of blood tinged pleural fluid. Thoracic  surgery following, s/p chest tube insertion on 7/2. Repeat chest xray shows improvement of pleural effusion, Heparin drip resumed. nephro Dr. Calle following. CT remains with increased output. hospital course complicated with worsening delirium, CT head done and showed no acute pathology and worsening left arm swelling.   Pt is followed by vascular.  plan for  CT to r/o central stenosis and  recommended outpatient follow up with Dr. Goncalves for further evaluation.   s/p HD on 7/7.   Pt with Left pigtail. on chest tube. Followed by thoracic surgery. s/p CXR and pigtail removed today by TS team. Post pigtail removal xray stable without pneumothorax. No further thoracic surgery intervention at this time. Pain management on board.  will resume AC therapy.         INTERVAL HPI/OVERNIGHT EVENTS:  left side   pain around pig tail removal site    MEDICATIONS  (STANDING):  alteplase  Injectable for Pleural Effusion 2 milliGRAM(s) IntraPleural. once  aMIOdarone    Tablet 200 milliGRAM(s) Oral daily  aspirin enteric coated 81 milliGRAM(s) Oral daily  calcium acetate 667 milliGRAM(s) Oral three times a day with meals  chlorhexidine 2% Cloths 1 Application(s) Topical daily  dextrose 5%. 1000 milliLiter(s) (50 mL/Hr) IV Continuous <Continuous>  dornase danilo Solution for Pleural Effusion 5 milliGRAM(s) IntraPleural. once  dornase danilo Solution for Pleural Effusion 5 milliGRAM(s) IntraPleural. once  insulin lispro (HumaLOG) corrective regimen sliding scale   SubCutaneous three times a day before meals  insulin lispro (HumaLOG) corrective regimen sliding scale   SubCutaneous at bedtime  ketotifen 0.025% Ophthalmic Solution 1 Drop(s) Both EYES two times a day  methimazole 5 milliGRAM(s) Oral daily  metoprolol tartrate 100 milliGRAM(s) Oral two times a day  pantoprazole    Tablet 40 milliGRAM(s) Oral before breakfast  senna 2 Tablet(s) Oral at bedtime  simvastatin 40 milliGRAM(s) Oral at bedtime    MEDICATIONS  (PRN):      __________________________________________________  REVIEW OF SYSTEMS:    CONSTITUTIONAL: No fever,   EYES: no acute visual disturbances  NECK: No pain or stiffness  RESPIRATORY: No cough; No shortness of breath  CARDIOVASCULAR: No chest pain, no palpitations  GASTROINTESTINAL: No pain. No nausea or vomiting; No diarrhea   NEUROLOGICAL: No headache or numbness, no tremors  MUSCULOSKELETAL: No joint pain, no muscle pain  GENITOURINARY: no dysuria, no frequency, no hesitancy  PSYCHIATRY: no depression , no anxiety  ALL OTHER  ROS negative        Vital Signs Last 24 Hrs  T(C): 36.6 (08 Jul 2020 08:08), Max: 36.9 (08 Jul 2020 05:52)  T(F): 97.9 (08 Jul 2020 08:08), Max: 98.5 (08 Jul 2020 05:52)  HR: 79 (08 Jul 2020 08:08) (74 - 82)  BP: 103/55 (08 Jul 2020 08:08) (98/62 - 107/63)  BP(mean): --  RR: 20 (08 Jul 2020 08:08) (18 - 20)  SpO2: 96% (08 Jul 2020 08:08) (96% - 99%)    ________________________________________________  PHYSICAL EXAM:  GENERAL: NAD, conversant  HEENT: Normocephalic;  conjunctivae and sclerae clear; moist mucous membranes;   NECK : supple  CHEST/LUNG: Clear to auscultation bilaterally with good air entry   HEART: S1 S2  regular; no murmurs, gallops or rubs  ABDOMEN: Soft, Nontender, Nondistended; Bowel sounds present  EXTREMITIES: no cyanosis; no edema; no calf tenderness  SKIN: warm and dry; no rash. left lateral CT pigtail removed. dressing intact  NERVOUS SYSTEM:  Awake and alert; Oriented  to place, person     _________________________________________________  LABS:                        12.1   7.68  )-----------( 175      ( 08 Jul 2020 06:17 )             39.6     07-08    136  |  97  |  59<H>  ----------------------------<  107<H>  4.5   |  28  |  7.98<H>    Ca    8.8      08 Jul 2020 06:17    TPro  6.7  /  Alb  2.8<L>  /  TBili  0.6  /  DBili  x   /  AST  14  /  ALT  18  /  AlkPhos  61  07-07    PT/INR - ( 08 Jul 2020 06:17 )   PT: 17.2 sec;   INR: 1.50 ratio         PTT - ( 08 Jul 2020 06:17 )  PTT:33.4 sec    CAPILLARY BLOOD GLUCOSE      POCT Blood Glucose.: 170 mg/dL (08 Jul 2020 11:23)  POCT Blood Glucose.: 96 mg/dL (08 Jul 2020 07:45)  POCT Blood Glucose.: 128 mg/dL (07 Jul 2020 21:08)  POCT Blood Glucose.: 222 mg/dL (07 Jul 2020 16:36)        RADIOLOGY & ADDITIONAL TESTS:    Imaging  Reviewed:  YES    < from: Xray Chest 1 View- PORTABLE-Urgent (07.08.20 @ 12:50) >    EXAM:  XR CHEST PORTABLE URGENT 1V                            PROCEDURE DATE:  07/08/2020          INTERPRETATION:    DATE OF STUDY: 7/8/2020 at 12:41PM    PRIOR: Earlier 7/8/20 exam.    CLINICAL INDICATION: S/P removal of left pigtail catheter.    TECHNIQUE: portable chest.    FINDINGS:   S/P sternotomy.  Left chest pigtail catheter has been removed.  Stable cardiomegaly.  Persistent left pleural effusion with stable lower left lung opacity.  No pneumothorax.    Right lung airspace opacities are stable.    IMPRESSION:   1. S/P removal of left pigtail catheter.  2. No pneumothorax.                LYNDON SÁNCHEZ M.D., ATTENDING RADIOLOGIST  This document has been electronically signed. Jul 8 2020  1:36PM                < end of copied text >    < from: Xray Chest 1 View- PORTABLE-Routine (07.08.20 @ 11:20) >    EXAM:  XR CHEST PORTABLE ROUTINE 1V                            PROCEDURE DATE:  07/08/2020          INTERPRETATION:  Follow-up.    AP chest. Prior 7/7/2020.    Left chest pigtail catheter reidentified in position. There is interval decrease in the airspace disease and effusion at the lateral left base. There is definition of left hemidiaphragm at this time. Small left pleural effusion or pleural reaction persists. No pneumothorax or other interval change.    Impression: As above                DAREN TAM M.D., ATTENDING RADIOLOGIST  This document has been electronically signed. Jul 8 2020 11:25AM                < end of copied text >      < from: US Duplex Venous Upper Ext Ltd, Left (07.07.20 @ 18:14) >    EXAM:  US DPLX UPR EXT VEINS LTD LT                            PROCEDURE DATE:  07/07/2020          INTERPRETATION:  CLINICAL INFORMATION: Swelling    COMPARISON: 6/30/2020.    TECHNIQUE: Duplex sonography of the LEFT UPPER extremity veins with color and spectral Doppler, with and without compression.      FINDINGS:    The left internal jugular, subclavian, axillary and brachial veins are patent and compressible where applicable.  The cephalic veins a superficial vein is patent and without thrombus. The radial vein is patent. Ulnar and basilic veins are not identified.    Doppler examination shows normal spontaneous and phasic flow.    IMPRESSION:   No evidence of left upper extremity deep venous thrombosis.                        SUSHANT GUZMAN M.D.,ATTENDING RADIOLOGIST  This document has been electronically signed. Jul 7 2020  6:29PM                < end of copied text >      Consultant(s) Notes Reviewed:   YES    Pulmonary. Thoracic surgery. Vascular    Plan of care was discussed with patient and /or primary care giver; all questions and concerns were addressed NP Note discussed with  Primary Attending    Patient is a 69y old  Male who presents with a chief complaint of leg pain (08 Jul 2020 08:24)    HPI-A 69M with CAD s/p multiple stents, s/p CABG (9/2019 course c/b Afib and LT pleural effusion), Diastolic CHF, HTN, HLD, DM2, Afib on coumadin and ESRD on HD (Tu, Thurs, Sat at [facility]), hyperthyroidism, presented to ER for pain and swelling of left leg. Admitted to medicine for LLE cellulitis. CT chest found to have large L loculated pleural effusion,s/p thoracentesis draining 150cc of blood tinged pleural fluid. Thoracic  surgery following, s/p chest tube insertion on 7/2. Repeat chest xray shows improvement of pleural effusion, Heparin drip resumed. nephro Dr. Calle following. CT remains with increased output. hospital course complicated with worsening delirium, CT head done and showed no acute pathology and worsening left arm swelling.   Pt is followed by vascular.  no need for CT to r/o central stenosis.  recommended outpatient follow up with Dr. Goncalves for further evaluation (fistulagram).   s/p HD on 7/7.   Pt with Left pigtail. on chest tube. Followed by thoracic surgery. s/p CXR and pigtail removed today by TS team. Post pigtail removal xray stable without pneumothorax. No further thoracic surgery intervention at this time. Pain management on board.  will resume AC therapy.         INTERVAL HPI/OVERNIGHT EVENTS:  left side   pain around pig tail removal site    MEDICATIONS  (STANDING):  alteplase  Injectable for Pleural Effusion 2 milliGRAM(s) IntraPleural. once  aMIOdarone    Tablet 200 milliGRAM(s) Oral daily  aspirin enteric coated 81 milliGRAM(s) Oral daily  calcium acetate 667 milliGRAM(s) Oral three times a day with meals  chlorhexidine 2% Cloths 1 Application(s) Topical daily  dextrose 5%. 1000 milliLiter(s) (50 mL/Hr) IV Continuous <Continuous>  dornase danilo Solution for Pleural Effusion 5 milliGRAM(s) IntraPleural. once  dornase danilo Solution for Pleural Effusion 5 milliGRAM(s) IntraPleural. once  insulin lispro (HumaLOG) corrective regimen sliding scale   SubCutaneous three times a day before meals  insulin lispro (HumaLOG) corrective regimen sliding scale   SubCutaneous at bedtime  ketotifen 0.025% Ophthalmic Solution 1 Drop(s) Both EYES two times a day  methimazole 5 milliGRAM(s) Oral daily  metoprolol tartrate 100 milliGRAM(s) Oral two times a day  pantoprazole    Tablet 40 milliGRAM(s) Oral before breakfast  senna 2 Tablet(s) Oral at bedtime  simvastatin 40 milliGRAM(s) Oral at bedtime    MEDICATIONS  (PRN):      __________________________________________________  REVIEW OF SYSTEMS:    CONSTITUTIONAL: No fever,   EYES: no acute visual disturbances  NECK: No pain or stiffness  RESPIRATORY: No cough; No shortness of breath  CARDIOVASCULAR: No chest pain, no palpitations  GASTROINTESTINAL: No pain. No nausea or vomiting; No diarrhea   NEUROLOGICAL: No headache or numbness, no tremors  MUSCULOSKELETAL: No joint pain, no muscle pain  GENITOURINARY: no dysuria, no frequency, no hesitancy  PSYCHIATRY: no depression , no anxiety  ALL OTHER  ROS negative        Vital Signs Last 24 Hrs  T(C): 36.6 (08 Jul 2020 08:08), Max: 36.9 (08 Jul 2020 05:52)  T(F): 97.9 (08 Jul 2020 08:08), Max: 98.5 (08 Jul 2020 05:52)  HR: 79 (08 Jul 2020 08:08) (74 - 82)  BP: 103/55 (08 Jul 2020 08:08) (98/62 - 107/63)  BP(mean): --  RR: 20 (08 Jul 2020 08:08) (18 - 20)  SpO2: 96% (08 Jul 2020 08:08) (96% - 99%)    ________________________________________________  PHYSICAL EXAM:  GENERAL: NAD, conversant  HEENT: Normocephalic;  conjunctivae and sclerae clear; moist mucous membranes;   NECK : supple  CHEST/LUNG: Clear to auscultation bilaterally with good air entry   HEART: S1 S2  regular; no murmurs, gallops or rubs  ABDOMEN: Soft, Nontender, Nondistended; Bowel sounds present  EXTREMITIES: no cyanosis; no edema; no calf tenderness  SKIN: warm and dry; no rash. left lateral CT pigtail removed. dressing intact  NERVOUS SYSTEM:  Awake and alert; Oriented  to place, person     _________________________________________________  LABS:                        12.1   7.68  )-----------( 175      ( 08 Jul 2020 06:17 )             39.6     07-08    136  |  97  |  59<H>  ----------------------------<  107<H>  4.5   |  28  |  7.98<H>    Ca    8.8      08 Jul 2020 06:17    TPro  6.7  /  Alb  2.8<L>  /  TBili  0.6  /  DBili  x   /  AST  14  /  ALT  18  /  AlkPhos  61  07-07    PT/INR - ( 08 Jul 2020 06:17 )   PT: 17.2 sec;   INR: 1.50 ratio         PTT - ( 08 Jul 2020 06:17 )  PTT:33.4 sec    CAPILLARY BLOOD GLUCOSE      POCT Blood Glucose.: 170 mg/dL (08 Jul 2020 11:23)  POCT Blood Glucose.: 96 mg/dL (08 Jul 2020 07:45)  POCT Blood Glucose.: 128 mg/dL (07 Jul 2020 21:08)  POCT Blood Glucose.: 222 mg/dL (07 Jul 2020 16:36)        RADIOLOGY & ADDITIONAL TESTS:    Imaging  Reviewed:  YES    < from: Xray Chest 1 View- PORTABLE-Urgent (07.08.20 @ 12:50) >    EXAM:  XR CHEST PORTABLE URGENT 1V                            PROCEDURE DATE:  07/08/2020          INTERPRETATION:    DATE OF STUDY: 7/8/2020 at 12:41PM    PRIOR: Earlier 7/8/20 exam.    CLINICAL INDICATION: S/P removal of left pigtail catheter.    TECHNIQUE: portable chest.    FINDINGS:   S/P sternotomy.  Left chest pigtail catheter has been removed.  Stable cardiomegaly.  Persistent left pleural effusion with stable lower left lung opacity.  No pneumothorax.    Right lung airspace opacities are stable.    IMPRESSION:   1. S/P removal of left pigtail catheter.  2. No pneumothorax.                LYNDON SÁNCHEZ M.D., ATTENDING RADIOLOGIST  This document has been electronically signed. Jul 8 2020  1:36PM                < end of copied text >    < from: Xray Chest 1 View- PORTABLE-Routine (07.08.20 @ 11:20) >    EXAM:  XR CHEST PORTABLE ROUTINE 1V                            PROCEDURE DATE:  07/08/2020          INTERPRETATION:  Follow-up.    AP chest. Prior 7/7/2020.    Left chest pigtail catheter reidentified in position. There is interval decrease in the airspace disease and effusion at the lateral left base. There is definition of left hemidiaphragm at this time. Small left pleural effusion or pleural reaction persists. No pneumothorax or other interval change.    Impression: As above                DAREN TAM M.D., ATTENDING RADIOLOGIST  This document has been electronically signed. Jul 8 2020 11:25AM                < end of copied text >      < from: US Duplex Venous Upper Ext Ltd, Left (07.07.20 @ 18:14) >    EXAM:  US DPLX UPR EXT VEINS LTD LT                            PROCEDURE DATE:  07/07/2020          INTERPRETATION:  CLINICAL INFORMATION: Swelling    COMPARISON: 6/30/2020.    TECHNIQUE: Duplex sonography of the LEFT UPPER extremity veins with color and spectral Doppler, with and without compression.      FINDINGS:    The left internal jugular, subclavian, axillary and brachial veins are patent and compressible where applicable.  The cephalic veins a superficial vein is patent and without thrombus. The radial vein is patent. Ulnar and basilic veins are not identified.    Doppler examination shows normal spontaneous and phasic flow.    IMPRESSION:   No evidence of left upper extremity deep venous thrombosis.                        SUSHANT GUZMAN M.D.,ATTENDING RADIOLOGIST  This document has been electronically signed. Jul 7 2020  6:29PM                < end of copied text >      Consultant(s) Notes Reviewed:   YES    Pulmonary. Thoracic surgery. Vascular    Plan of care was discussed with patient and /or primary care giver; all questions and concerns were addressed

## 2020-07-08 NOTE — CHART NOTE - NSCHARTNOTEFT_GEN_A_CORE
Assessment:   69yMalePatient is a 69y old  Male who presents with a chief complaint of leg pain (08 Jul 2020 13:27).  Pt visited. Pt is more alert and looks Clinically better. Pt Reports Appetite improving. Wife at bedside. Reports pt ate  everything for Lunch. Wife brings culture food from Home. Pt continuos to be on Dialysis       Factors impacting intake: [ ] none [ ] nausea  [ ] vomiting [ ] diarrhea [ ] constipation  [ ]chewing problems [ ] swallowing issues  [ ] other:     Diet Presciption: Diet, DASH/TLC:   Sodium & Cholesterol Restricted  Consistent Carbohydrate {Evening Snacks}  1200mL Fluid Restriction ([id number])  For patients receiving Renal Replacement - No Protein Restr, No Conc K, No Conc Phos, Low Sodium (RENAL) (06-18-20 @ 19:28)    Intake: >75 %    Current Weight:   % Weight Change    Pertinent Medications: MEDICATIONS  (STANDING):  alteplase  Injectable for Pleural Effusion 2 milliGRAM(s) IntraPleural. once  aMIOdarone    Tablet 200 milliGRAM(s) Oral daily  aspirin enteric coated 81 milliGRAM(s) Oral daily  calcium acetate 667 milliGRAM(s) Oral three times a day with meals  chlorhexidine 2% Cloths 1 Application(s) Topical daily  dextrose 5%. 1000 milliLiter(s) (50 mL/Hr) IV Continuous <Continuous>  dornase danilo Solution for Pleural Effusion 5 milliGRAM(s) IntraPleural. once  dornase danilo Solution for Pleural Effusion 5 milliGRAM(s) IntraPleural. once  heparin  Infusion.  Unit(s)/Hr (15 mL/Hr) IV Continuous <Continuous>  insulin lispro (HumaLOG) corrective regimen sliding scale   SubCutaneous three times a day before meals  insulin lispro (HumaLOG) corrective regimen sliding scale   SubCutaneous at bedtime  ketotifen 0.025% Ophthalmic Solution 1 Drop(s) Both EYES two times a day  methimazole 5 milliGRAM(s) Oral daily  metoprolol tartrate 100 milliGRAM(s) Oral two times a day  pantoprazole    Tablet 40 milliGRAM(s) Oral before breakfast  senna 2 Tablet(s) Oral at bedtime  simvastatin 40 milliGRAM(s) Oral at bedtime  warfarin 5 milliGRAM(s) Oral at bedtime    MEDICATIONS  (PRN):  acetaminophen  IVPB .. 1000 milliGRAM(s) IV Intermittent once PRN Moderate Pain (4 - 6)  heparin   Injectable 6500 Unit(s) IV Push every 6 hours PRN For aPTT less than 40  heparin   Injectable 3000 Unit(s) IV Push every 6 hours PRN For aPTT between 40 - 57  HYDROmorphone   Tablet 2 milliGRAM(s) Oral every 6 hours PRN Severe Pain (7 - 10)    Pertinent Labs: 07-08 Na136 mmol/L Glu 107 mg/dL<H> K+ 4.5 mmol/L Cr  7.98 mg/dL<H> BUN 59 mg/dL<H> 07-07 Alb 2.8 g/dL<L> 06-19 Chol 94 mg/dL LDL 39 mg/dL HDL 33 mg/dL<L> Trig 110 mg/dL     CAPILLARY BLOOD GLUCOSE      POCT Blood Glucose.: 170 mg/dL (08 Jul 2020 11:23)  POCT Blood Glucose.: 96 mg/dL (08 Jul 2020 07:45)  POCT Blood Glucose.: 128 mg/dL (07 Jul 2020 21:08)  POCT Blood Glucose.: 222 mg/dL (07 Jul 2020 16:36)    Skin:     Estimated Needs:   [ ] no change since previous assessment  [ ] recalculated:     Previous Nutrition Diagnosis:   [ ] Inadequate Energy Intake [ ]Inadequate Oral Intake [ ] Excessive Energy Intake   [ ] Underweight [ ] Increased Nutrient Needs [ ] Overweight/Obesity   [ ] Altered GI Function [ ] Unintended Weight Loss [ ] Food & Nutrition Related Knowledge Deficit [ ] Malnutrition   (x) Altered Nutrition Related Lab continuos.    Nutrition Diagnosis is [ ] ongoing  [ ] resolved [ ] not applicable     New Nutrition Diagnosis: [ ] not applicable       Interventions:   Recommend  [ ] Change Diet To:  [x ] Nutrition Supplement Glucerna shakes Once a day.   [ ] Nutrition Support  [X ] Other:  Nephrocaps.    Monitoring and Evaluation:   [ ] PO intake [ x ] Tolerance to diet prescription [ x ] weights [ x ] labs[ x ] follow up per protocol  [ ] other:

## 2020-07-08 NOTE — PROGRESS NOTE ADULT - ATTENDING COMMENTS
HPI:  69M with CAD s/p multiple stents, s/p CABG (9/2019 course c/b Afib and LT pleural effusion), Diastolic CHF, HTN, HLD, DM2, Afib on coumadin and ESRD on HD (Tu, Thurs, Sat at [facility]), hyperthyroidism, presented to ER for pain and swelling of left leg since 3 days. Patient reports sustaining injury to posterior aspect of left leg 1 month ago when he accidentally hit his leg against his bed. He reports the wound has not healed since. Reports he developed pain and swelling of left leg since 3 days, 9/10 in severity, with difficulty ambulating. Reports he was advised to get doppler which he reviewed with [facility] cardiologist today, was told he has no clot and recommended to come to ER. Denies fever, chills, weakness, cough, dysuria, NVDC. (18 Jun 2020 19:29)      - AMS DUE TO  METABOLIC ENCEPHALOPATHY AND VASCULAR DEMENTIA  - AVOID DELIRIOGENIC AGENTS, FREQUENT RE-ORIENTATION. CT HEAD IS DONE ON 07-, NO ACUTE CHANGES , BUT CHRONIC LACUNAR INFARCTS. D/W PATIENT'S SON AND HIS CARDIOLOGIST     -  CELLULITIS OF LEFT LEG, DIABETIC LEFT FOOT ULCER  - IMPROVED ON VANCOMYCIN [PER ID RECOMMENDATION,  BLOOD  CXS NGTD. ID F/UP IS IN PROGRESS  BY DR. DUEÑAS    - LOCULATED LEFT SIDED PLEURAL EFFUSION S/P IR PLACED PIG TAIL CATHETER ON  07-, S/P TPA INFUSION VIA CHEST TUBE 07/07/2020 AND REMOVAL 07/08/2020 PULMONOLOGY CONSULT & THORACIC SURGERY CONSULT IN PROGRESS. CASE DISCUSSED WITH PATIENT AND SON.   - LEFT ARM SWELLING -  ULTRASOUND NEGATIVE FOR DVT, VASCULAR SX CONSULT IN PLACE - RECOMMENDED OUTPATIENT FISTULOGRAM TO EVALUATION FOR CENTRAL STENOSIS  -  ESRD ON HD - NEPHROLOGY CONSULT IN PROGRESS  - DIASTOLIC CHF - TTE W/ EVIDENCE OF RIGHT/LEFT VENTRICULAR SYSTOLIC DYSFUNCTION, PULMONARY HTN, MR, STRESS TEST-  WITHOUT NEW PERFUSION ABNORMALITIES; CARDIOLOGY CONSULT IN PROGRESS  - A/FIB ON COUMADIN - RESUME COUMADIN & HEPARIN DRIP; GOAL INR 2-3  -  PAD, DIABETIC PERIPHERAL NEUROPATHY - PAIN MGMT CONSULT IN PROGRESS  - CASE DISCUSSED EXTENSIVELY WITH SON [SYLVAIN RENTERIA ] AND PATIENT. DISCUSSED RISKS/BENEFITS OF CHEST TUBE PLACEMENT WITH SON, WHO AGREED TO PROCEED WITH INTERVENTION.  -  PAD, DIABETIC PERIPHERAL NEUROPATHY  -  GI AND DVT PROPHYLAXIS    DONALD DIAZ M.D. COVERING FOR EPHRAIM DIAZ M.D.

## 2020-07-08 NOTE — PROGRESS NOTE ADULT - SUBJECTIVE AND OBJECTIVE BOX
[facility] : Progress Note :: [phone], (office [phone]),   Dr Calle / Dr Esposito / Dr Macias / Dr Mendiola / Dr Narendra JULIEN / Dr Arroyo / Dr Knight / Dr Renan pickens  _____________________________________________________________________________________________    s/p D/C of chest tube. feels better    lisinopril (Other)  opioid-like analgesics (Other)    Hospital Medications:   MEDICATIONS  (STANDING):  alteplase  Injectable for Pleural Effusion 2 milliGRAM(s) IntraPleural. once  aMIOdarone    Tablet 200 milliGRAM(s) Oral daily  aspirin enteric coated 81 milliGRAM(s) Oral daily  calcium acetate 667 milliGRAM(s) Oral three times a day with meals  chlorhexidine 2% Cloths 1 Application(s) Topical daily  dextrose 5%. 1000 milliLiter(s) (50 mL/Hr) IV Continuous <Continuous>  dornase danilo Solution for Pleural Effusion 5 milliGRAM(s) IntraPleural. once  dornase danilo Solution for Pleural Effusion 5 milliGRAM(s) IntraPleural. once  heparin  Infusion.  Unit(s)/Hr (15 mL/Hr) IV Continuous <Continuous>  insulin lispro (HumaLOG) corrective regimen sliding scale   SubCutaneous three times a day before meals  insulin lispro (HumaLOG) corrective regimen sliding scale   SubCutaneous at bedtime  ketotifen 0.025% Ophthalmic Solution 1 Drop(s) Both EYES two times a day  methimazole 5 milliGRAM(s) Oral daily  metoprolol tartrate 100 milliGRAM(s) Oral two times a day  pantoprazole    Tablet 40 milliGRAM(s) Oral before breakfast  senna 2 Tablet(s) Oral at bedtime  simvastatin 40 milliGRAM(s) Oral at bedtime  warfarin 5 milliGRAM(s) Oral at bedtime        VITALS:  T(F): 98.3 (07-08-20 @ 15:52), Max: 98.5 (07-08-20 @ 05:52)  HR: 87 (07-08-20 @ 15:52)  BP: 109/61 (07-08-20 @ 15:52)  RR: 22 (07-08-20 @ 15:52)  SpO2: 96% (07-08-20 @ 15:52)  Wt(kg): --    07-07 @ 07:01  -  07-08 @ 07:00  --------------------------------------------------------  IN: 400 mL / OUT: 1645 mL / NET: -1245 mL        PHYSICAL EXAM:  Constitutional: NAD  HEENT: anicteric sclera, oropharynx clear, MMM  Neck: No JVD  Respiratory: CTAB, no wheezes, rales or rhonchi  Cardiovascular: S1, S2, RRR  Gastrointestinal: BS+, soft, NT/ND  Extremities:  peripheral edema+  Neurological: A/O x 3, no focal deficits  Vascular Access: LUEAVF swollen LT arm    LABS:  07-08    136  |  97  |  59<H>  ----------------------------<  107<H>  4.5   |  28  |  7.98<H>    Ca    8.8      08 Jul 2020 06:17    TPro  6.7  /  Alb  2.8<L>  /  TBili  0.6  /  DBili      /  AST  14  /  ALT  18  /  AlkPhos  61  07-07    Creatinine Trend: 7.98 <--, 10.00 <--, 8.66 <--, 7.39 <--, 9.27 <--, 8.17 <--, 10.70 <--                        12.1   7.68  )-----------( 175      ( 08 Jul 2020 06:17 )             39.6     Urine Studies:      RADIOLOGY & ADDITIONAL STUDIES:

## 2020-07-08 NOTE — CHART NOTE - NSCHARTNOTEFT_GEN_A_CORE
Patient seen at bedside for removal of left pigtail chest tube. Dressing taken down and securing suture clipped. Pigtail removed and site covered with xeroform, gauze and medipore. Patient tolerated well. Post pigtail removal xray stable without pneumothorax. No further thoracic surgery intervention at this time. May resume anticoagulation at 2PM. Please reconsult as needed.

## 2020-07-08 NOTE — PROGRESS NOTE ADULT - SUBJECTIVE AND OBJECTIVE BOX
69y Male    Meds:    Allergies    lisinopril (Other)  opioid-like analgesics (Other)    Intolerances        VITALS:  Vital Signs Last 24 Hrs  T(C): 36.8 (08 Jul 2020 15:52), Max: 36.9 (08 Jul 2020 05:52)  T(F): 98.3 (08 Jul 2020 15:52), Max: 98.5 (08 Jul 2020 05:52)  HR: 87 (08 Jul 2020 15:52) (78 - 87)  BP: 109/61 (08 Jul 2020 15:52) (100/58 - 109/61)  BP(mean): --  RR: 22 (08 Jul 2020 15:52) (18 - 22)  SpO2: 96% (08 Jul 2020 15:52) (96% - 99%)    LABS/DIAGNOSTIC TESTS:                          12.1   7.68  )-----------( 175      ( 08 Jul 2020 06:17 )             39.6         07-08    136  |  97  |  59<H>  ----------------------------<  107<H>  4.5   |  28  |  7.98<H>    Ca    8.8      08 Jul 2020 06:17    TPro  6.7  /  Alb  2.8<L>  /  TBili  0.6  /  DBili  x   /  AST  14  /  ALT  18  /  AlkPhos  61  07-07      LIVER FUNCTIONS - ( 07 Jul 2020 07:05 )  Alb: 2.8 g/dL / Pro: 6.7 g/dL / ALK PHOS: 61 U/L / ALT: 18 U/L DA / AST: 14 U/L / GGT: x             CULTURES: .Body Fluid pleural left  07-02 @ 23:29   No growth at 5 days  --    No polymorphonuclear leukocytes seen  No organisms seen  by cytocentrifuge      .Body Fluid Pleural Fluid  06-30 @ 22:58   No growth at 1 week.  --  --      .Blood Blood  06-18 @ 22:15   No Growth Final  --  --            RADIOLOGY:      ROS:  [  ] UNABLE TO ELICIT 69y Male who is still in the hospital but asked to reeval as his left arm is still very swollen, he has no fevers or chills, he denies any pain in the arm , he is a little confused , he is dialyzed on T-T-S. He is on no abxs, he has no leukocytosis or fevers.    Meds:    Allergies    lisinopril (Other)  opioid-like analgesics (Other)    Intolerances        VITALS:  Vital Signs Last 24 Hrs  T(C): 36.8 (08 Jul 2020 15:52), Max: 36.9 (08 Jul 2020 05:52)  T(F): 98.3 (08 Jul 2020 15:52), Max: 98.5 (08 Jul 2020 05:52)  HR: 87 (08 Jul 2020 15:52) (78 - 87)  BP: 109/61 (08 Jul 2020 15:52) (100/58 - 109/61)  BP(mean): --  RR: 22 (08 Jul 2020 15:52) (18 - 22)  SpO2: 96% (08 Jul 2020 15:52) (96% - 99%)    LABS/DIAGNOSTIC TESTS:                          12.1   7.68  )-----------( 175      ( 08 Jul 2020 06:17 )             39.6         07-08    136  |  97  |  59<H>  ----------------------------<  107<H>  4.5   |  28  |  7.98<H>    Ca    8.8      08 Jul 2020 06:17    TPro  6.7  /  Alb  2.8<L>  /  TBili  0.6  /  DBili  x   /  AST  14  /  ALT  18  /  AlkPhos  61  07-07      LIVER FUNCTIONS - ( 07 Jul 2020 07:05 )  Alb: 2.8 g/dL / Pro: 6.7 g/dL / ALK PHOS: 61 U/L / ALT: 18 U/L DA / AST: 14 U/L / GGT: x             CULTURES: .Body Fluid pleural left  07-02 @ 23:29   No growth at 5 days  --    No polymorphonuclear leukocytes seen  No organisms seen  by cytocentrifuge      .Body Fluid Pleural Fluid  06-30 @ 22:58   No growth at 1 week.  --  --      .Blood Blood  06-18 @ 22:15   No Growth Final  --  --            RADIOLOGY:  < from: Xray Chest 1 View- PORTABLE-Urgent (07.08.20 @ 12:50) >  EXAM:  XR CHEST PORTABLE URGENT 1V                            PROCEDURE DATE:  07/08/2020          INTERPRETATION:    DATE OF STUDY: 7/8/2020 at 12:41PM    PRIOR: Earlier 7/8/20 exam.    CLINICAL INDICATION: S/P removal of left pigtail catheter.    TECHNIQUE: portable chest.    FINDINGS:   S/P sternotomy.  Left chest pigtail catheter has been removed.  Stable cardiomegaly.  Persistent left pleural effusion with stable lower left lung opacity.  No pneumothorax.    Right lung airspace opacities are stable.    IMPRESSION:   1. S/P removal of left pigtail catheter.  2. No pneumothorax.          LYNDON SÁNCHEZ M.D., ATTENDING RADIOLOGIST  This document has been electronically signed. Jul 8 2020  1:36PM                < end of copied text >      ROS:  [  ] UNABLE TO ELICIT

## 2020-07-09 LAB
ALBUMIN SERPL ELPH-MCNC: 2.5 G/DL — LOW (ref 3.5–5)
ALP SERPL-CCNC: 66 U/L — SIGNIFICANT CHANGE UP (ref 40–120)
ALT FLD-CCNC: 19 U/L DA — SIGNIFICANT CHANGE UP (ref 10–60)
ANION GAP SERPL CALC-SCNC: 13 MMOL/L — SIGNIFICANT CHANGE UP (ref 5–17)
APTT BLD: 86.6 SEC — HIGH (ref 27.5–35.5)
APTT BLD: 99.2 SEC — HIGH (ref 27.5–35.5)
AST SERPL-CCNC: 17 U/L — SIGNIFICANT CHANGE UP (ref 10–40)
BASOPHILS # BLD AUTO: 0.05 K/UL — SIGNIFICANT CHANGE UP (ref 0–0.2)
BASOPHILS NFR BLD AUTO: 0.6 % — SIGNIFICANT CHANGE UP (ref 0–2)
BILIRUB SERPL-MCNC: 0.5 MG/DL — SIGNIFICANT CHANGE UP (ref 0.2–1.2)
BUN SERPL-MCNC: 85 MG/DL — HIGH (ref 7–18)
CALCIUM SERPL-MCNC: 8.5 MG/DL — SIGNIFICANT CHANGE UP (ref 8.4–10.5)
CHLORIDE SERPL-SCNC: 95 MMOL/L — LOW (ref 96–108)
CO2 SERPL-SCNC: 25 MMOL/L — SIGNIFICANT CHANGE UP (ref 22–31)
CREAT SERPL-MCNC: 9.51 MG/DL — HIGH (ref 0.5–1.3)
EOSINOPHIL # BLD AUTO: 0.17 K/UL — SIGNIFICANT CHANGE UP (ref 0–0.5)
EOSINOPHIL NFR BLD AUTO: 1.9 % — SIGNIFICANT CHANGE UP (ref 0–6)
GLUCOSE BLDC GLUCOMTR-MCNC: 118 MG/DL — HIGH (ref 70–99)
GLUCOSE BLDC GLUCOMTR-MCNC: 120 MG/DL — HIGH (ref 70–99)
GLUCOSE BLDC GLUCOMTR-MCNC: 149 MG/DL — HIGH (ref 70–99)
GLUCOSE BLDC GLUCOMTR-MCNC: 164 MG/DL — HIGH (ref 70–99)
GLUCOSE SERPL-MCNC: 150 MG/DL — HIGH (ref 70–99)
HCT VFR BLD CALC: 38.7 % — LOW (ref 39–50)
HGB BLD-MCNC: 11.8 G/DL — LOW (ref 13–17)
IMM GRANULOCYTES NFR BLD AUTO: 1.7 % — HIGH (ref 0–1.5)
INR BLD: 1.43 RATIO — HIGH (ref 0.88–1.16)
LYMPHOCYTES # BLD AUTO: 0.72 K/UL — LOW (ref 1–3.3)
LYMPHOCYTES # BLD AUTO: 8.2 % — LOW (ref 13–44)
MCHC RBC-ENTMCNC: 28.9 PG — SIGNIFICANT CHANGE UP (ref 27–34)
MCHC RBC-ENTMCNC: 30.5 GM/DL — LOW (ref 32–36)
MCV RBC AUTO: 94.6 FL — SIGNIFICANT CHANGE UP (ref 80–100)
MONOCYTES # BLD AUTO: 1.14 K/UL — HIGH (ref 0–0.9)
MONOCYTES NFR BLD AUTO: 13.1 % — SIGNIFICANT CHANGE UP (ref 2–14)
NEUTROPHILS # BLD AUTO: 6.5 K/UL — SIGNIFICANT CHANGE UP (ref 1.8–7.4)
NEUTROPHILS NFR BLD AUTO: 74.5 % — SIGNIFICANT CHANGE UP (ref 43–77)
NRBC # BLD: 0 /100 WBCS — SIGNIFICANT CHANGE UP (ref 0–0)
PLATELET # BLD AUTO: 171 K/UL — SIGNIFICANT CHANGE UP (ref 150–400)
POTASSIUM SERPL-MCNC: 4.7 MMOL/L — SIGNIFICANT CHANGE UP (ref 3.5–5.3)
POTASSIUM SERPL-SCNC: 4.7 MMOL/L — SIGNIFICANT CHANGE UP (ref 3.5–5.3)
PROT SERPL-MCNC: 6.3 G/DL — SIGNIFICANT CHANGE UP (ref 6–8.3)
PROTHROM AB SERPL-ACNC: 16.5 SEC — HIGH (ref 10.6–13.6)
RBC # BLD: 4.09 M/UL — LOW (ref 4.2–5.8)
RBC # FLD: 15.8 % — HIGH (ref 10.3–14.5)
SODIUM SERPL-SCNC: 133 MMOL/L — LOW (ref 135–145)
WBC # BLD: 8.73 K/UL — SIGNIFICANT CHANGE UP (ref 3.8–10.5)
WBC # FLD AUTO: 8.73 K/UL — SIGNIFICANT CHANGE UP (ref 3.8–10.5)

## 2020-07-09 RX ORDER — WARFARIN SODIUM 2.5 MG/1
7 TABLET ORAL AT BEDTIME
Refills: 0 | Status: COMPLETED | OUTPATIENT
Start: 2020-07-09 | End: 2020-07-09

## 2020-07-09 RX ADMIN — SIMVASTATIN 40 MILLIGRAM(S): 20 TABLET, FILM COATED ORAL at 21:49

## 2020-07-09 RX ADMIN — KETOTIFEN FUMARATE 1 DROP(S): 0.34 SOLUTION OPHTHALMIC at 17:46

## 2020-07-09 RX ADMIN — WARFARIN SODIUM 7 MILLIGRAM(S): 2.5 TABLET ORAL at 21:48

## 2020-07-09 RX ADMIN — AMIODARONE HYDROCHLORIDE 200 MILLIGRAM(S): 400 TABLET ORAL at 05:09

## 2020-07-09 RX ADMIN — KETOTIFEN FUMARATE 1 DROP(S): 0.34 SOLUTION OPHTHALMIC at 05:09

## 2020-07-09 RX ADMIN — Medication 100 MILLIGRAM(S): at 17:46

## 2020-07-09 RX ADMIN — Medication 667 MILLIGRAM(S): at 14:47

## 2020-07-09 RX ADMIN — PANTOPRAZOLE SODIUM 40 MILLIGRAM(S): 20 TABLET, DELAYED RELEASE ORAL at 05:09

## 2020-07-09 RX ADMIN — HEPARIN SODIUM 1200 UNIT(S)/HR: 5000 INJECTION INTRAVENOUS; SUBCUTANEOUS at 11:29

## 2020-07-09 RX ADMIN — Medication 667 MILLIGRAM(S): at 07:52

## 2020-07-09 RX ADMIN — SENNA PLUS 2 TABLET(S): 8.6 TABLET ORAL at 21:49

## 2020-07-09 RX ADMIN — Medication 250 MILLIGRAM(S): at 12:22

## 2020-07-09 RX ADMIN — CHLORHEXIDINE GLUCONATE 1 APPLICATION(S): 213 SOLUTION TOPICAL at 14:47

## 2020-07-09 RX ADMIN — Medication 81 MILLIGRAM(S): at 14:46

## 2020-07-09 RX ADMIN — Medication 667 MILLIGRAM(S): at 17:46

## 2020-07-09 RX ADMIN — HEPARIN SODIUM 1200 UNIT(S)/HR: 5000 INJECTION INTRAVENOUS; SUBCUTANEOUS at 04:58

## 2020-07-09 NOTE — PROGRESS NOTE ADULT - PROBLEM SELECTOR PLAN 10
discharge disposition home pending clinical improvement, s/p pigtail removal 7/8.  on heparin drip discharge disposition home pending clinical improvement, s/p pigtail removal 7/8.  on heparin drip. PT reconsulted.

## 2020-07-09 NOTE — PROGRESS NOTE ADULT - SUBJECTIVE AND OBJECTIVE BOX
Patient denies CP,SOB Review of systems otherwise (-)    acetaminophen  IVPB .. 1000 milliGRAM(s) IV Intermittent once PRN  alteplase  Injectable for Pleural Effusion 2 milliGRAM(s) IntraPleural. once  aMIOdarone    Tablet 200 milliGRAM(s) Oral daily  aspirin enteric coated 81 milliGRAM(s) Oral daily  calcium acetate 667 milliGRAM(s) Oral three times a day with meals  chlorhexidine 2% Cloths 1 Application(s) Topical daily  dextrose 5%. 1000 milliLiter(s) IV Continuous <Continuous>  dornase danilo Solution for Pleural Effusion 5 milliGRAM(s) IntraPleural. once  dornase danilo Solution for Pleural Effusion 5 milliGRAM(s) IntraPleural. once  heparin   Injectable 6500 Unit(s) IV Push every 6 hours PRN  heparin   Injectable 3000 Unit(s) IV Push every 6 hours PRN  heparin  Infusion.  Unit(s)/Hr IV Continuous <Continuous>  insulin lispro (HumaLOG) corrective regimen sliding scale   SubCutaneous three times a day before meals  insulin lispro (HumaLOG) corrective regimen sliding scale   SubCutaneous at bedtime  ketotifen 0.025% Ophthalmic Solution 1 Drop(s) Both EYES two times a day  methimazole 5 milliGRAM(s) Oral daily  metoprolol tartrate 100 milliGRAM(s) Oral two times a day  pantoprazole    Tablet 40 milliGRAM(s) Oral before breakfast  senna 2 Tablet(s) Oral at bedtime  simvastatin 40 milliGRAM(s) Oral at bedtime  traMADol 50 milliGRAM(s) Oral every 6 hours PRN  vancomycin  IVPB 1000 milliGRAM(s) IV Intermittent <User Schedule>  warfarin 7 milliGRAM(s) Oral at bedtime                            11.8   8.73  )-----------( 171      ( 09 Jul 2020 10:50 )             38.7       Hemoglobin: 11.8 g/dL (07-09 @ 10:50)  Hemoglobin: 11.9 g/dL (07-08 @ 21:53)  Hemoglobin: 12.1 g/dL (07-08 @ 06:17)  Hemoglobin: 12.4 g/dL (07-07 @ 07:05)  Hemoglobin: 12.4 g/dL (07-06 @ 06:33)      07-09    133<L>  |  95<L>  |  85<H>  ----------------------------<  150<H>  4.7   |  25  |  9.51<H>    Ca    8.5      09 Jul 2020 10:50    TPro  6.3  /  Alb  2.5<L>  /  TBili  0.5  /  DBili  x   /  AST  17  /  ALT  19  /  AlkPhos  66  07-09    Creatinine Trend: 9.51<--, 7.98<--, 10.00<--, 8.66<--, 7.39<--, 9.27<--    COAGS: PT/INR - ( 09 Jul 2020 10:50 )   PT: 16.5 sec;   INR: 1.43 ratio         PTT - ( 09 Jul 2020 10:50 )  PTT:99.2 sec          T(C): 36.8 (07-09-20 @ 15:54), Max: 36.9 (07-09-20 @ 13:45)  HR: 91 (07-09-20 @ 15:54) (72 - 93)  BP: 124/52 (07-09-20 @ 15:54) (101/61 - 134/104)  RR: 17 (07-09-20 @ 15:54) (16 - 18)  SpO2: 96% (07-09-20 @ 15:54) (95% - 100%)  Wt(kg): --    I&O's Summary    09 Jul 2020 07:01  -  09 Jul 2020 17:42  --------------------------------------------------------  IN: 400 mL / OUT: 2400 mL / NET: -2000 mL      Gen: Appears well in NAD  HEENT:  (-)icterus (-)pallor  CV: N S1 S2 1/6 DEJAH (+)2 Pulses B/l  Resp:  Clear to ausculatation B/L, normal effort  GI: (+) BS Soft, NT, ND  Lymph:  (-)lower extremity edema (+) left upper extremity swelling, (-)obvious lymphadenopathy  Skin: Warm to touch, Normal turgor  Psych: Appropriate mood and affect        ASSESSMENT/PLAN: 69y Male  CAD s/p multiple stents, s/p CABG (9/2019 course c/b Afib and LT pleural effusion), Normal LV systolic function, severe Diastolic, RV dysfx CHF, HTN, HLD, DM2, Afib on coumadin and ESRD on HD (Tu, Thurs, Sat at [facility]), hyperthyroidism, presented to ER for pain and swelling of left leg since 3 days.    - LE dopplers negative for dVT   - s/p pigtail drain of left pleural effusion, s/p TPA 7/7  - Chest tube d/c'd  - Restarted on heparin gtt and coumadin, goal INR 2-3  - continue with medical management of known CAD with stents with sapt   - Vascular eval noted, oupt f/u with Jessi Reynolds MD, [facility]  BEEPER [phone]

## 2020-07-09 NOTE — PROVIDER CONTACT NOTE (CRITICAL VALUE NOTIFICATION) - SITUATION
.8
As per heparin drip nomogram stop infusion for 60min and restart at 12cc/hr
will be endorsed to SULY Arreguin, the primary nurse

## 2020-07-09 NOTE — PROGRESS NOTE ADULT - SUBJECTIVE AND OBJECTIVE BOX
Time of Visit:  Patient seen and examined.     MEDICATIONS  (STANDING):  alteplase  Injectable for Pleural Effusion 2 milliGRAM(s) IntraPleural. once  aMIOdarone    Tablet 200 milliGRAM(s) Oral daily  aspirin enteric coated 81 milliGRAM(s) Oral daily  calcium acetate 667 milliGRAM(s) Oral three times a day with meals  chlorhexidine 2% Cloths 1 Application(s) Topical daily  dextrose 5%. 1000 milliLiter(s) (50 mL/Hr) IV Continuous <Continuous>  dornase danilo Solution for Pleural Effusion 5 milliGRAM(s) IntraPleural. once  dornase danilo Solution for Pleural Effusion 5 milliGRAM(s) IntraPleural. once  heparin  Infusion.  Unit(s)/Hr (15 mL/Hr) IV Continuous <Continuous>  insulin lispro (HumaLOG) corrective regimen sliding scale   SubCutaneous three times a day before meals  insulin lispro (HumaLOG) corrective regimen sliding scale   SubCutaneous at bedtime  ketotifen 0.025% Ophthalmic Solution 1 Drop(s) Both EYES two times a day  methimazole 5 milliGRAM(s) Oral daily  metoprolol tartrate 100 milliGRAM(s) Oral two times a day  pantoprazole    Tablet 40 milliGRAM(s) Oral before breakfast  senna 2 Tablet(s) Oral at bedtime  simvastatin 40 milliGRAM(s) Oral at bedtime  vancomycin  IVPB 1000 milliGRAM(s) IV Intermittent <User Schedule>  warfarin 7 milliGRAM(s) Oral at bedtime      MEDICATIONS  (PRN):  acetaminophen  IVPB .. 1000 milliGRAM(s) IV Intermittent once PRN Moderate Pain (4 - 6)  heparin   Injectable 6500 Unit(s) IV Push every 6 hours PRN For aPTT less than 40  heparin   Injectable 3000 Unit(s) IV Push every 6 hours PRN For aPTT between 40 - 57  traMADol 50 milliGRAM(s) Oral every 6 hours PRN Severe Pain (7 - 10)       Medications up to date at time of exam.      PHYSICAL EXAMINATION:  Patient has no new complaints.  GENERAL: The patient is a well-developed, well-nourished, in no apparent distress.     Vital Signs Last 24 Hrs  T(C): 36.8 (09 Jul 2020 15:54), Max: 36.9 (09 Jul 2020 13:45)  T(F): 98.2 (09 Jul 2020 15:54), Max: 98.5 (09 Jul 2020 13:45)  HR: 91 (09 Jul 2020 15:54) (72 - 93)  BP: 124/52 (09 Jul 2020 15:54) (101/61 - 134/104)  BP(mean): --  RR: 17 (09 Jul 2020 15:54) (16 - 18)  SpO2: 96% (09 Jul 2020 15:54) (95% - 100%)   (if applicable)    Chest Tube (if applicable)    HEENT: Head is normocephalic and atraumatic. Extraocular muscles are intact. Mucous membranes are moist.     NECK: Supple, no palpable adenopathy.    LUNGS: Clear to auscultation, no wheezing, rales, or rhonchi.    HEART: Regular rate and rhythm without murmur.    ABDOMEN: Soft, nontender, and nondistended.  No hepatosplenomegaly is noted.    : No painful voiding, no pelvic pain    EXTREMITIES: Without any cyanosis, clubbing, rash, lesions or edema.    NEUROLOGIC: Awake, alert, oriented, grossly intact    SKIN: Warm, dry, good turgor.      LABS:                        11.8   8.73  )-----------( 171      ( 09 Jul 2020 10:50 )             38.7     07-09    133<L>  |  95<L>  |  85<H>  ----------------------------<  150<H>  4.7   |  25  |  9.51<H>    Ca    8.5      09 Jul 2020 10:50    TPro  6.3  /  Alb  2.5<L>  /  TBili  0.5  /  DBili  x   /  AST  17  /  ALT  19  /  AlkPhos  66  07-09    PT/INR - ( 09 Jul 2020 10:50 )   PT: 16.5 sec;   INR: 1.43 ratio         PTT - ( 09 Jul 2020 10:50 )  PTT:99.2 sec                    MICROBIOLOGY: (if applicable)    RADIOLOGY & ADDITIONAL STUDIES:  EKG:   CXR:  ECHO:    IMPRESSION: 69y Male PAST MEDICAL & SURGICAL HISTORY:  ESRD (end stage renal disease) on dialysis: T-Th-Sat  Stented coronary artery: total 15 stents from [id number]  Hyperthyroidism  H/O: CVA: after cardiac stent placed 12/15/09 -no residual  Heart Attack: 2/1/07 with subsequent stent  Coronary Stent: [id number] 10 stents,  to Ramus 1/2015, cath 01/2016 ( see results in HPI)  Hypercholesterolemia  CAD (Coronary Artery Disease)  DM (Diabetes Mellitus): x 4 yrs without N/N/R  HTN (Hypertension)  S/P CABG x 4  Hemodialysis access, AV graft: 5/2015, L arm  S/P cataract extraction  Boil of Buttock: 2006 and 2008   p/w           IMP: This is a  69 man  with CAD s/p multiple stents, s/p CABG (9/2019 course c/b Afib and LT pleural effusion), Diastolic CHF, HTN, HLD, DM2, Afib on coumadin and ESRD on HD (Tu, Thurs, Sat at [facility]), hyperthyroidism, presented to ER for pain and swelling of left leg since 3 days. Admitted to medicine for LLE cellulitis.  CXR shows gross heart enlargement and mild to moderate left base pleural pulmonary reaction again noted. Sternotomy again seen. Negative for DVT on Doppler study. Blood Cx NGTD. Pt was started on Unasyn and po Doxy, but changed to Vancomycin due to non healing cellulitis. ID consulted.  Improved leg swelling and erythema with Vanco.   Pt. with ESRD on HD, followed by nephro Dr. Calle, HD tolerated. Pt. with significant cardiac hx including AF on Coumadin, followed by cardiology. Coumadin dose adjusted for goal INR 2-3. ECHO resulted EF 40-45% (previous EF 55% in Jan. 2020.  CT chest reveal enlarging left loculated pleural effusion. Pat is not in any resp distress and O2 sat 100 % 2 LPM via NC. He will require chest tube placement but INR is elevated due to coumadin. Thoracic surgery unable to place chest tube, s/p thoracentesis 6/30 .. 150 ml . Post procedure cxr neg for ptx.  IR placed Left chest tube 7/2. repeated cxr, no improvement .       Sugg;  -chest tube d/c 7/8  -post chest tube removal cxr no PTX  -f/u pleural fluid cytology  -dialysis as per neph  -continue O2 via NC as needed  -cxr in AM

## 2020-07-09 NOTE — PROGRESS NOTE ADULT - PROBLEM SELECTOR PLAN 2
periods of confusions slowly improving, likely due to hospital delirium   CT head with chronic changes   avoid delirium provoking agents   supportive care

## 2020-07-09 NOTE — PROGRESS NOTE ADULT - PROBLEM SELECTOR PLAN 7
rate controlled on Metoprolol   cont Amiodarone   c/w AC therapy  f/u cardiology dr. Colin rate controlled on Metoprolol   cont Amiodarone   c/w Coumadin  INR 1.43- increased coumadin to 7mg today.   f/u cardiology dr. Colin  plan to keep heparin drip until INR 2-3.

## 2020-07-09 NOTE — PROVIDER CONTACT NOTE (CRITICAL VALUE NOTIFICATION) - PERSON GIVING RESULT:
Micheal / [facility]
NETTE boggs
laboratory - [facility]
NII Núñez / [place]
LUCIA Graf

## 2020-07-09 NOTE — PROGRESS NOTE ADULT - SUBJECTIVE AND OBJECTIVE BOX
NP Note discussed with  Primary Attending    Patient is a 69y old  Male who presents with a chief complaint of leg pain (08 Jul 2020 18:45)    HPI- A 69M with CAD s/p multiple stents, s/p CABG (9/2019 course c/b Afib and LT pleural effusion), Diastolic CHF, HTN, HLD, DM2, Afib on coumadin and ESRD on HD (Tu, Thurs, Sat at [facility]), hyperthyroidism, presented to ER for pain and swelling of left leg. Admitted to medicine for LLE cellulitis. CT chest found to have large L loculated pleural effusion,s/p thoracentesis draining 150cc of blood tinged pleural fluid. Thoracic  surgery following, s/p chest tube insertion on 7/2. Repeat chest xray shows improvement of pleural effusion, Heparin drip resumed. nephro Dr. Calle following. CT remains with increased output. hospital course complicated with worsening delirium, CT head done and showed no acute pathology and worsening left arm swelling.   Pt is followed by vascular.  no need for CT to r/o central stenosis.  recommended outpatient follow up with Dr. Goncalves for further evaluation (fistulagram).   Pigtail chest tube removed by [facility] surgery 7/8. post chest xray stable without pneumothorax. No further thoracic surgery intervention at this time. Pain management on board.   Resumed  AC therapy with heparin drip and coumadin.  Vancomycin resumed as per ID for left arm swelling.   Scheduled for HD today.       INTERVAL HPI/OVERNIGHT EVENTS: no new complaints  normogram aptt elevated over night held heparin 2 hrs and resumed. now in therapeutic range.     MEDICATIONS  (STANDING):  alteplase  Injectable for Pleural Effusion 2 milliGRAM(s) IntraPleural. once  aMIOdarone    Tablet 200 milliGRAM(s) Oral daily  aspirin enteric coated 81 milliGRAM(s) Oral daily  calcium acetate 667 milliGRAM(s) Oral three times a day with meals  chlorhexidine 2% Cloths 1 Application(s) Topical daily  dextrose 5%. 1000 milliLiter(s) (50 mL/Hr) IV Continuous <Continuous>  dornase danilo Solution for Pleural Effusion 5 milliGRAM(s) IntraPleural. once  dornase danilo Solution for Pleural Effusion 5 milliGRAM(s) IntraPleural. once  heparin  Infusion.  Unit(s)/Hr (15 mL/Hr) IV Continuous <Continuous>  insulin lispro (HumaLOG) corrective regimen sliding scale   SubCutaneous three times a day before meals  insulin lispro (HumaLOG) corrective regimen sliding scale   SubCutaneous at bedtime  ketotifen 0.025% Ophthalmic Solution 1 Drop(s) Both EYES two times a day  methimazole 5 milliGRAM(s) Oral daily  metoprolol tartrate 100 milliGRAM(s) Oral two times a day  pantoprazole    Tablet 40 milliGRAM(s) Oral before breakfast  senna 2 Tablet(s) Oral at bedtime  simvastatin 40 milliGRAM(s) Oral at bedtime  vancomycin  IVPB 1000 milliGRAM(s) IV Intermittent <User Schedule>    MEDICATIONS  (PRN):  acetaminophen  IVPB .. 1000 milliGRAM(s) IV Intermittent once PRN Moderate Pain (4 - 6)  heparin   Injectable 6500 Unit(s) IV Push every 6 hours PRN For aPTT less than 40  heparin   Injectable 3000 Unit(s) IV Push every 6 hours PRN For aPTT between 40 - 57  traMADol 50 milliGRAM(s) Oral every 6 hours PRN Severe Pain (7 - 10)      __________________________________________________  REVIEW OF SYSTEMS:    CONSTITUTIONAL: No fever,   EYES: no acute visual disturbances  NECK: No pain or stiffness  RESPIRATORY: No cough; No shortness of breath  CARDIOVASCULAR: No chest pain, no palpitations  GASTROINTESTINAL: No pain. No nausea or vomiting; No diarrhea   NEUROLOGICAL: No headache or numbness, no tremors  MUSCULOSKELETAL: No joint pain, no muscle pain  GENITOURINARY: no dysuria, no frequency, no hesitancy  PSYCHIATRY: no depression , no anxiety  ALL OTHER  ROS negative        Vital Signs Last 24 Hrs  T(C): 36.8 (09 Jul 2020 08:08), Max: 36.8 (08 Jul 2020 15:52)  T(F): 98.2 (09 Jul 2020 08:08), Max: 98.3 (08 Jul 2020 15:52)  HR: 82 (09 Jul 2020 08:08) (74 - 87)  BP: 106/48 (09 Jul 2020 08:08) (101/61 - 112/69)  BP(mean): --  RR: 18 (09 Jul 2020 08:08) (17 - 22)  SpO2: 95% (09 Jul 2020 08:08) (95% - 97%)    ________________________________________________  PHYSICAL EXAM:  GENERAL: NAD conversant comprehensive on exam  HEENT: Normocephalic;  conjunctivae and sclerae clear; moist mucous membranes;   NECK : supple  CHEST/LUNG: Clear to auscultation bilaterally with good air entry   HEART: S1 S2  regular; no murmurs, gallops or rubs  ABDOMEN: Soft, Nontender, Nondistended; Bowel sounds present  EXTREMITIES: no cyanosis; Left upper arm swelling. non tender on palpation.  no calf tenderness  SKIN: warm and dry; no rash  NERVOUS SYSTEM:  Awake and alert; Oriented  to place, person     _________________________________________________  LABS:                        11.9   7.72  )-----------( 173      ( 08 Jul 2020 21:53 )             39.7     07-08    136  |  97  |  59<H>  ----------------------------<  107<H>  4.5   |  28  |  7.98<H>    Ca    8.8      08 Jul 2020 06:17      PT/INR - ( 08 Jul 2020 06:17 )   PT: 17.2 sec;   INR: 1.50 ratio         PTT - ( 09 Jul 2020 04:25 )  PTT:86.6 sec    CAPILLARY BLOOD GLUCOSE      POCT Blood Glucose.: 118 mg/dL (09 Jul 2020 07:45)  POCT Blood Glucose.: 129 mg/dL (08 Jul 2020 20:59)  POCT Blood Glucose.: 141 mg/dL (08 Jul 2020 17:05)  POCT Blood Glucose.: 170 mg/dL (08 Jul 2020 11:23)        RADIOLOGY & ADDITIONAL TESTS:    Imaging  Reviewed:  YES    < from: Xray Chest 1 View- PORTABLE-Urgent (07.08.20 @ 12:50) >    EXAM:  XR CHEST PORTABLE URGENT 1V                            PROCEDURE DATE:  07/08/2020          INTERPRETATION:    DATE OF STUDY: 7/8/2020 at 12:41PM    PRIOR: Earlier 7/8/20 exam.    CLINICAL INDICATION: S/P removal of left pigtail catheter.    TECHNIQUE: portable chest.    FINDINGS:   S/P sternotomy.  Left chest pigtail catheter has been removed.  Stable cardiomegaly.  Persistent left pleural effusion with stable lower left lung opacity.  No pneumothorax.    Right lung airspace opacities are stable.    IMPRESSION:   1. S/P removal of left pigtail catheter.  2. No pneumothorax.                LYNDON SÁNCHEZ M.D., ATTENDING RADIOLOGIST  This document has been electronically signed. Jul 8 2020  1:36PM                < end of copied text >      Consultant(s) Notes Reviewed:   YES  Pulmonary. nephrology. ID Cardiology    Plan of care was discussed with patient and /or primary care giver; all questions and concerns were addressed

## 2020-07-09 NOTE — PROVIDER CONTACT NOTE (CRITICAL VALUE NOTIFICATION) - ACTION/TREATMENT ORDERED:
Hold Heparin drip for 1 hour
As per nomogram decrease by 2ml/hr, new dose 13ml/hr
Stop infusion at 2130 and restart in 60min at 12cc/hr

## 2020-07-09 NOTE — PROGRESS NOTE ADULT - PROBLEM SELECTOR PLAN 1
s/p pigtail insertion by IR on 7/2  s/p TPA on 7/7   Removed CT pig tail 7/8  resumed Heparin and Coumadin as per CT surgery  monitor nomogram with heparin drip  Followed by Pulmachelle Delaney   Thoracic surgery following  post pigtail removal Xray stable w/o pneumothorax s/p pigtail insertion by IR on 7/2  s/p TPA on 7/7   Removed CT pig tail 7/8  resumed Heparin and Coumadin as per CT surgery  monitor nomogram with heparin drip  plan to keep heparin drip until INR reaches therapeutic as per attending.  INR 1.43 today.   increase coumadin 7mg.   Followed by Pulm Dr. Delaney   Thoracic surgery following  post pigtail removal Xray stable w/o pneumothorax

## 2020-07-09 NOTE — PROGRESS NOTE ADULT - PROBLEM SELECTOR PLAN 3
left arm with edema and tenderness   afebrile, no leukocytosis   LUE and LE dopplers negative for DVT  ID Dr. Rayo following  Vascular following. rec-OP f/u with dr. Dr. Goncalves for fistulagram.  resumed Vanco as per ID

## 2020-07-09 NOTE — PROGRESS NOTE ADULT - ATTENDING COMMENTS
HPI:  69M with CAD s/p multiple stents, s/p CABG (9/2019 course c/b Afib and LT pleural effusion), Diastolic CHF, HTN, HLD, DM2, Afib on coumadin and ESRD on HD (Tu, Thurs, Sat at [facility]), hyperthyroidism, presented to ER for pain and swelling of left leg since 3 days. Patient reports sustaining injury to posterior aspect of left leg 1 month ago when he accidentally hit his leg against his bed. He reports the wound has not healed since. Reports he developed pain and swelling of left leg since 3 days, 9/10 in severity, with difficulty ambulating. Reports he was advised to get doppler which he reviewed with [facility] cardiologist today, was told he has no clot and recommended to come to ER. Denies fever, chills, weakness, cough, dysuria, NVDC. (18 Jun 2020 19:29)    - F/U PT/OT EVALUATION AND RECCOMENDATION  - AMS DUE TO  METABOLIC ENCEPHALOPATHY AND VASCULAR DEMENTIA  - AVOID DELIRIOGENIC AGENTS, FREQUENT RE-ORIENTATION. CT HEAD IS DONE ON 07-, NO ACUTE CHANGES , BUT CHRONIC LACUNAR INFARCTS. D/W PATIENT'S SON AND HIS CARDIOLOGIST     -  CELLULITIS OF LEFT LEG, DIABETIC LEFT FOOT ULCER  - IMPROVED ON VANCOMYCIN [PER ID RECOMMENDATION,  BLOOD  CXS NGTD. ID F/UP IS IN PROGRESS  BY DR. DUEÑAS    - LOCULATED LEFT SIDED PLEURAL EFFUSION S/P IR PLACED PIG TAIL CATHETER ON  07-, S/P TPA INFUSION VIA CHEST TUBE 07/07/2020 AND REMOVAL 07/08/2020 PULMONOLOGY CONSULT & THORACIC SURGERY CONSULT IN PROGRESS. CASE DISCUSSED WITH PATIENT AND SON.   - LEFT ARM SWELLING -  ULTRASOUND NEGATIVE FOR DVT, VASCULAR SX CONSULT IN PLACE - RECOMMENDED OUTPATIENT FISTULOGRAM TO EVALUATION FOR CENTRAL STENOSIS  -  ESRD ON HD - NEPHROLOGY CONSULT IN PROGRESS  - DIASTOLIC CHF - TTE W/ EVIDENCE OF RIGHT/LEFT VENTRICULAR SYSTOLIC DYSFUNCTION, PULMONARY HTN, MR, STRESS TEST-  WITHOUT NEW PERFUSION ABNORMALITIES; CARDIOLOGY CONSULT IN PROGRESS  - A/FIB ON COUMADIN - RESUME COUMADIN & HEPARIN DRIP; GOAL INR 2-3  -  PAD, DIABETIC PERIPHERAL NEUROPATHY - PAIN MGMT CONSULT IN PROGRESS  - CASE DISCUSSED EXTENSIVELY WITH SON [SYLVAIN RENTERIA ] AND PATIENT. DISCUSSED RISKS/BENEFITS OF CHEST TUBE PLACEMENT WITH SON, WHO AGREED TO PROCEED WITH INTERVENTION.  -  PAD, DIABETIC PERIPHERAL NEUROPATHY  -  GI AND DVT PROPHYLAXIS    DONALD DIAZ M.D. COVERING FOR EPHRAIM DIAZ M.D.

## 2020-07-10 LAB
ALBUMIN SERPL ELPH-MCNC: 2.6 G/DL — LOW (ref 3.5–5)
ALP SERPL-CCNC: 64 U/L — SIGNIFICANT CHANGE UP (ref 40–120)
ALT FLD-CCNC: 20 U/L DA — SIGNIFICANT CHANGE UP (ref 10–60)
ANION GAP SERPL CALC-SCNC: 6 MMOL/L — SIGNIFICANT CHANGE UP (ref 5–17)
APTT BLD: 134.4 SEC — CRITICAL HIGH (ref 27.5–35.5)
APTT BLD: 55.8 SEC — HIGH (ref 27.5–35.5)
AST SERPL-CCNC: 20 U/L — SIGNIFICANT CHANGE UP (ref 10–40)
BILIRUB SERPL-MCNC: 0.5 MG/DL — SIGNIFICANT CHANGE UP (ref 0.2–1.2)
BUN SERPL-MCNC: 57 MG/DL — HIGH (ref 7–18)
CALCIUM SERPL-MCNC: 8.7 MG/DL — SIGNIFICANT CHANGE UP (ref 8.4–10.5)
CHLORIDE SERPL-SCNC: 100 MMOL/L — SIGNIFICANT CHANGE UP (ref 96–108)
CO2 SERPL-SCNC: 31 MMOL/L — SIGNIFICANT CHANGE UP (ref 22–31)
CREAT SERPL-MCNC: 7.4 MG/DL — HIGH (ref 0.5–1.3)
GLUCOSE BLDC GLUCOMTR-MCNC: 117 MG/DL — HIGH (ref 70–99)
GLUCOSE BLDC GLUCOMTR-MCNC: 143 MG/DL — HIGH (ref 70–99)
GLUCOSE BLDC GLUCOMTR-MCNC: 190 MG/DL — HIGH (ref 70–99)
GLUCOSE BLDC GLUCOMTR-MCNC: 198 MG/DL — HIGH (ref 70–99)
GLUCOSE SERPL-MCNC: 128 MG/DL — HIGH (ref 70–99)
HCT VFR BLD CALC: 42.3 % — SIGNIFICANT CHANGE UP (ref 39–50)
HGB BLD-MCNC: 12.4 G/DL — LOW (ref 13–17)
INR BLD: 1.5 RATIO — HIGH (ref 0.88–1.16)
MCHC RBC-ENTMCNC: 28.6 PG — SIGNIFICANT CHANGE UP (ref 27–34)
MCHC RBC-ENTMCNC: 29.3 GM/DL — LOW (ref 32–36)
MCV RBC AUTO: 97.7 FL — SIGNIFICANT CHANGE UP (ref 80–100)
NRBC # BLD: 0 /100 WBCS — SIGNIFICANT CHANGE UP (ref 0–0)
PLATELET # BLD AUTO: 180 K/UL — SIGNIFICANT CHANGE UP (ref 150–400)
POTASSIUM SERPL-MCNC: 4.7 MMOL/L — SIGNIFICANT CHANGE UP (ref 3.5–5.3)
POTASSIUM SERPL-SCNC: 4.7 MMOL/L — SIGNIFICANT CHANGE UP (ref 3.5–5.3)
PROT SERPL-MCNC: 6.8 G/DL — SIGNIFICANT CHANGE UP (ref 6–8.3)
PROTHROM AB SERPL-ACNC: 17.2 SEC — HIGH (ref 10.6–13.6)
RBC # BLD: 4.33 M/UL — SIGNIFICANT CHANGE UP (ref 4.2–5.8)
RBC # FLD: 15.9 % — HIGH (ref 10.3–14.5)
SODIUM SERPL-SCNC: 137 MMOL/L — SIGNIFICANT CHANGE UP (ref 135–145)
WBC # BLD: 8.52 K/UL — SIGNIFICANT CHANGE UP (ref 3.8–10.5)
WBC # FLD AUTO: 8.52 K/UL — SIGNIFICANT CHANGE UP (ref 3.8–10.5)

## 2020-07-10 RX ORDER — WARFARIN SODIUM 2.5 MG/1
9 TABLET ORAL ONCE
Refills: 0 | Status: COMPLETED | OUTPATIENT
Start: 2020-07-10 | End: 2020-07-10

## 2020-07-10 RX ADMIN — Medication 667 MILLIGRAM(S): at 07:58

## 2020-07-10 RX ADMIN — Medication 667 MILLIGRAM(S): at 18:21

## 2020-07-10 RX ADMIN — Medication 1: at 12:02

## 2020-07-10 RX ADMIN — KETOTIFEN FUMARATE 1 DROP(S): 0.34 SOLUTION OPHTHALMIC at 05:56

## 2020-07-10 RX ADMIN — Medication 667 MILLIGRAM(S): at 12:02

## 2020-07-10 RX ADMIN — Medication 100 MILLIGRAM(S): at 05:56

## 2020-07-10 RX ADMIN — Medication 100 MILLIGRAM(S): at 18:21

## 2020-07-10 RX ADMIN — HEPARIN SODIUM 900 UNIT(S)/HR: 5000 INJECTION INTRAVENOUS; SUBCUTANEOUS at 08:16

## 2020-07-10 RX ADMIN — WARFARIN SODIUM 9 MILLIGRAM(S): 2.5 TABLET ORAL at 22:55

## 2020-07-10 RX ADMIN — KETOTIFEN FUMARATE 1 DROP(S): 0.34 SOLUTION OPHTHALMIC at 18:24

## 2020-07-10 RX ADMIN — Medication 81 MILLIGRAM(S): at 12:02

## 2020-07-10 RX ADMIN — SIMVASTATIN 40 MILLIGRAM(S): 20 TABLET, FILM COATED ORAL at 22:08

## 2020-07-10 RX ADMIN — AMIODARONE HYDROCHLORIDE 200 MILLIGRAM(S): 400 TABLET ORAL at 06:21

## 2020-07-10 RX ADMIN — HEPARIN SODIUM 1100 UNIT(S)/HR: 5000 INJECTION INTRAVENOUS; SUBCUTANEOUS at 19:27

## 2020-07-10 RX ADMIN — CHLORHEXIDINE GLUCONATE 1 APPLICATION(S): 213 SOLUTION TOPICAL at 12:09

## 2020-07-10 RX ADMIN — PANTOPRAZOLE SODIUM 40 MILLIGRAM(S): 20 TABLET, DELAYED RELEASE ORAL at 05:56

## 2020-07-10 RX ADMIN — Medication 1: at 17:31

## 2020-07-10 RX ADMIN — SENNA PLUS 2 TABLET(S): 8.6 TABLET ORAL at 22:06

## 2020-07-10 RX ADMIN — HEPARIN SODIUM 0 UNIT(S)/HR: 5000 INJECTION INTRAVENOUS; SUBCUTANEOUS at 07:05

## 2020-07-10 RX ADMIN — HEPARIN SODIUM 3000 UNIT(S): 5000 INJECTION INTRAVENOUS; SUBCUTANEOUS at 19:31

## 2020-07-10 NOTE — PHYSICAL THERAPY INITIAL EVALUATION ADULT - PERTINENT HX OF CURRENT PROBLEM, REHAB EVAL
patient to ER due to pain and swelling of Left leg. H/O injury one month prior.
initially admitted due to leg pain; s/p thoracentesis on 6/30 and left chest tube placement on 7/2 , removal on 7/9

## 2020-07-10 NOTE — PROGRESS NOTE ADULT - ATTENDING COMMENTS
HPI:  69M with CAD s/p multiple stents, s/p CABG (9/2019 course c/b Afib and LT pleural effusion), Diastolic CHF, HTN, HLD, DM2, Afib on coumadin and ESRD on HD (Tu, Thurs, Sat at [facility]), hyperthyroidism, presented to ER for pain and swelling of left leg since 3 days. Patient reports sustaining injury to posterior aspect of left leg 1 month ago when he accidentally hit his leg against his bed. He reports the wound has not healed since. Reports he developed pain and swelling of left leg since 3 days, 9/10 in severity, with difficulty ambulating. Reports he was advised to get doppler which he reviewed with [facility] cardiologist today, was told he has no clot and recommended to come to ER. Denies fever, chills, weakness, cough, dysuria, NVDC. (18 Jun 2020 19:29)    - PT/OT RECOMMENDS HOME WITH HOME PT/OT AND HYPOXIA TO 84 % ON ROOM AIR AT REST - WILL NEED HOME O2; CASE MANAGEMENT ARRANGING  - AMS DUE TO  METABOLIC ENCEPHALOPATHY AND VASCULAR DEMENTIA  - AVOID DELIRIOGENIC AGENTS, FREQUENT RE-ORIENTATION. CT HEAD IS DONE ON 07-, NO ACUTE CHANGES , BUT CHRONIC LACUNAR INFARCTS. D/W PATIENT'S SON AND HIS CARDIOLOGIST     -  CELLULITIS OF LEFT LEG, DIABETIC LEFT FOOT ULCER  - IMPROVED ON VANCOMYCIN [PER ID RECOMMENDATION,  BLOOD  CXS NGTD. ID F/UP IS IN PROGRESS  BY DR. DUEÑAS    - LOCULATED LEFT SIDED PLEURAL EFFUSION S/P IR PLACED PIG TAIL CATHETER ON  07-, S/P TPA INFUSION VIA CHEST TUBE 07/07/2020 AND REMOVAL 07/08/2020 PULMONOLOGY CONSULT & THORACIC SURGERY CONSULT IN PROGRESS. CASE DISCUSSED WITH PATIENT AND SON.   - LEFT ARM SWELLING -  ULTRASOUND NEGATIVE FOR DVT, VASCULAR SX CONSULT IN PLACE - RECOMMENDED OUTPATIENT FISTULOGRAM TO EVALUATION FOR CENTRAL STENOSIS  -  ESRD ON HD - NEPHROLOGY CONSULT IN PROGRESS  - DIASTOLIC CHF - TTE W/ EVIDENCE OF RIGHT/LEFT VENTRICULAR SYSTOLIC DYSFUNCTION, PULMONARY HTN, MR, STRESS TEST-  WITHOUT NEW PERFUSION ABNORMALITIES; CARDIOLOGY CONSULT IN PROGRESS  - A/FIB ON COUMADIN - RESUME COUMADIN & HEPARIN DRIP; GOAL INR 2-3  -  PAD, DIABETIC PERIPHERAL NEUROPATHY - PAIN MGMT CONSULT IN PROGRESS  - CASE DISCUSSED EXTENSIVELY WITH SON [SYLVAIN RENTERIA ] AND PATIENT. DISCUSSED RISKS/BENEFITS OF CHEST TUBE PLACEMENT WITH SON, WHO AGREED TO PROCEED WITH INTERVENTION.  -  PAD, DIABETIC PERIPHERAL NEUROPATHY  -  GI AND DVT PROPHYLAXIS    DONALD DIAZ M.D. COVERING FOR EPHRAIM DIAZ M.D.

## 2020-07-10 NOTE — PHYSICAL THERAPY INITIAL EVALUATION ADULT - ADDITIONAL COMMENTS
patient uses R.W. as needed. Family assist as needed in ADLs.
patient uses R.W. as needed. Family assist as needed in ADLs.

## 2020-07-10 NOTE — PROGRESS NOTE ADULT - ATTENDING COMMENTS
Rudy Mendiola MD  [facility]  Office [phone]  Ans Serv [phone] cell - [phone]

## 2020-07-10 NOTE — PHYSICAL THERAPY INITIAL EVALUATION ADULT - LIVES WITH, PROFILE
family in house with 4 steps to get in with railing on both side. Bedroom is on main floor
family in house with 4 steps to get in with railing on both side. Bedroom is on main floor

## 2020-07-10 NOTE — PHYSICAL THERAPY INITIAL EVALUATION ADULT - PATIENT/FAMILY/SIGNIFICANT OTHER GOALS STATEMENT, PT EVAL
To get better and to be able to go home
return home; ambulate, transfer, and perform ADLs independently using a standard walker

## 2020-07-10 NOTE — PHYSICAL THERAPY INITIAL EVALUATION ADULT - MODALITIES TREATMENT COMMENTS
Patient desaturates to 84% on room air at rest; requires O2@3L/min via NC when performing tasks to maintain SpO2 >90%

## 2020-07-10 NOTE — PROGRESS NOTE ADULT - SUBJECTIVE AND OBJECTIVE BOX
[facility] : Progress Note :: [phone], (office [phone]),   Dr Calle / Dr Esposito / Dr Macias / Dr Mendiola / Dr Narendra JULIEN / Dr Arroyo / Dr Knight / Dr Renan pickens  _____________________________________________________________________________________________    no complains. seen siting in chair.     PAST MEDICAL & SURGICAL HISTORY:  ESRD (end stage renal disease) on dialysis: T-Th-Sat  Stented coronary artery: total 15 stents from [id number]  Hyperthyroidism  H/O: CVA: after cardiac stent placed 12/15/09 -no residual  Heart Attack: 2/1/07 with subsequent stent  Coronary Stent: [id number] 10 stents,  to Ramus 1/2015, cath 01/2016 ( see results in HPI)  Hypercholesterolemia  CAD (Coronary Artery Disease)  DM (Diabetes Mellitus): x 4 yrs without N/N/R  HTN (Hypertension)  S/P CABG x 4  Hemodialysis access, AV graft: 5/2015, L arm  S/P cataract extraction  Boil of Buttock: 2006 and 2008    lisinopril (Other)  opioid-like analgesics (Other)    Home Medications Reviewed  Hospital Medications:   MEDICATIONS  (STANDING):  alteplase  Injectable for Pleural Effusion 2 milliGRAM(s) IntraPleural. once  aMIOdarone    Tablet 200 milliGRAM(s) Oral daily  aspirin enteric coated 81 milliGRAM(s) Oral daily  calcium acetate 667 milliGRAM(s) Oral three times a day with meals  chlorhexidine 2% Cloths 1 Application(s) Topical daily  dextrose 5%. 1000 milliLiter(s) (50 mL/Hr) IV Continuous <Continuous>  dornase danilo Solution for Pleural Effusion 5 milliGRAM(s) IntraPleural. once  dornase danilo Solution for Pleural Effusion 5 milliGRAM(s) IntraPleural. once  heparin  Infusion.  Unit(s)/Hr (15 mL/Hr) IV Continuous <Continuous>  insulin lispro (HumaLOG) corrective regimen sliding scale   SubCutaneous three times a day before meals  insulin lispro (HumaLOG) corrective regimen sliding scale   SubCutaneous at bedtime  ketotifen 0.025% Ophthalmic Solution 1 Drop(s) Both EYES two times a day  methimazole 5 milliGRAM(s) Oral daily  metoprolol tartrate 100 milliGRAM(s) Oral two times a day  pantoprazole    Tablet 40 milliGRAM(s) Oral before breakfast  senna 2 Tablet(s) Oral at bedtime  simvastatin 40 milliGRAM(s) Oral at bedtime  vancomycin  IVPB 1000 milliGRAM(s) IV Intermittent <User Schedule>  warfarin 9 milliGRAM(s) Oral once    SOCIAL HISTORY:  Denies ETOh,Smoking,   FAMILY HISTORY:  Family history of coronary artery disease  Family history of diabetes mellitus (Sibling)        VITALS:  T(F): 97.8 (07-10-20 @ 08:02), Max: 98.5 (07-09-20 @ 13:45)  HR: 73 (07-10-20 @ 08:02)  BP: 107/58 (07-10-20 @ 08:02)  RR: 18 (07-10-20 @ 08:02)  SpO2: 100% (07-10-20 @ 08:02)  Wt(kg): --    07-09 @ 07:01  -  07-10 @ 07:00  --------------------------------------------------------  IN: 400 mL / OUT: 2400 mL / NET: -2000 mL        PHYSICAL EXAM:  Constitutional: NAD  HEENT: anicteric sclera, oropharynx clear.  Neck: No JVD  Respiratory: CTAB, no wheezes, rales or rhonchi  Cardiovascular: S1, S2, RRR  Gastrointestinal: BS+, soft, NT/ND  Extremities: No peripheral edema  Neurological: A/O x 3, no focal deficits  : No CVA tenderness. No wagner.   Vascular Access: AVF with thrill and bruit    LABS:  07-10    137  |  100  |  57<H>  ----------------------------<  128<H>  4.7   |  31  |  7.40<H>    Ca    8.7      10 Jul 2020 06:13    TPro  6.8  /  Alb  2.6<L>  /  TBili  0.5  /  DBili      /  AST  20  /  ALT  20  /  AlkPhos  64  07-10    Creatinine Trend: 7.40 <--, 9.51 <--, 7.98 <--, 10.00 <--, 8.66 <--, 7.39 <--, 9.27 <--                        12.4   8.52  )-----------( 180      ( 10 Jul 2020 06:13 )             42.3     Urine Studies:      RADIOLOGY & ADDITIONAL STUDIES:

## 2020-07-10 NOTE — PHYSICAL THERAPY INITIAL EVALUATION ADULT - SKIN INTEGRITY
wound/on left upper back covered with gauze wound dressing, clean, dry, and intact/surgical incision

## 2020-07-10 NOTE — PROGRESS NOTE ADULT - PROBLEM SELECTOR PLAN 3
left arm with edema and tenderness   afebrile, no leukocytosis   LUE and LE dopplers negative for DVT  ID Dr. Rayo following  Vascular following. rec-OP f/u with dr. Dr. Goncalves for fistulagram.  resumed Vanco during HD as per ID  f/u Vanco troph before 4th dose

## 2020-07-10 NOTE — PROGRESS NOTE ADULT - PROBLEM SELECTOR PLAN 10
discharge disposition home pending clinical improvement, s/p pigtail removal 7/8.  on heparin drip. waiting for therapeutic INR. PT reconsulted.

## 2020-07-10 NOTE — PROGRESS NOTE ADULT - COVID-19 NEGATIVE LAB RESULT
COVID-19 ruled out

## 2020-07-10 NOTE — PROGRESS NOTE ADULT - SUBJECTIVE AND OBJECTIVE BOX
NP Note discussed with  Primary Attending    Patient is a 69y old  Male who presents with a chief complaint of leg pain (10 Jul 2020 09:34)    HPI-  A 69M with CAD s/p multiple stents, s/p CABG (9/2019 course c/b Afib and LT pleural effusion), Diastolic CHF, HTN, HLD, DM2, Afib on coumadin and ESRD on HD (Tu, Thurs, Sat at [facility]), hyperthyroidism, presented to ER for pain and swelling of left leg. Admitted to medicine for LLE cellulitis. CT chest found to have large L loculated pleural effusion,s/p thoracentesis draining 150cc of blood tinged pleural fluid. Thoracic  surgery following, s/p chest tube insertion on 7/2. Repeat chest xray shows improvement of pleural effusion, Heparin drip resumed. nephro Dr. Calle following. CT remains with increased output. hospital course complicated with worsening delirium, CT head done and showed no acute pathology and worsening left arm swelling.   Pt is followed by vascular.  no need for CT to r/o central stenosis.  recommended outpatient follow up with Dr. Goncalves for further evaluation (fistulagram).   [facility] post chest xray stable without pneumothorax. No further thoracic surgery intervention at this time. Pain management on board.   Resumed  AC therapy with heparin drip and coumadin.  Vancomycin resumed as per ID to give during HD for left arm swelling. on HD T/Th/S.    PT reconsulted for functional status.         INTERVAL HPI/OVERNIGHT EVENTS: no new complaints    MEDICATIONS  (STANDING):  alteplase  Injectable for Pleural Effusion 2 milliGRAM(s) IntraPleural. once  aMIOdarone    Tablet 200 milliGRAM(s) Oral daily  aspirin enteric coated 81 milliGRAM(s) Oral daily  calcium acetate 667 milliGRAM(s) Oral three times a day with meals  chlorhexidine 2% Cloths 1 Application(s) Topical daily  dextrose 5%. 1000 milliLiter(s) (50 mL/Hr) IV Continuous <Continuous>  dornase danilo Solution for Pleural Effusion 5 milliGRAM(s) IntraPleural. once  dornase danilo Solution for Pleural Effusion 5 milliGRAM(s) IntraPleural. once  heparin  Infusion.  Unit(s)/Hr (15 mL/Hr) IV Continuous <Continuous>  insulin lispro (HumaLOG) corrective regimen sliding scale   SubCutaneous three times a day before meals  insulin lispro (HumaLOG) corrective regimen sliding scale   SubCutaneous at bedtime  ketotifen 0.025% Ophthalmic Solution 1 Drop(s) Both EYES two times a day  methimazole 5 milliGRAM(s) Oral daily  metoprolol tartrate 100 milliGRAM(s) Oral two times a day  pantoprazole    Tablet 40 milliGRAM(s) Oral before breakfast  senna 2 Tablet(s) Oral at bedtime  simvastatin 40 milliGRAM(s) Oral at bedtime  vancomycin  IVPB 1000 milliGRAM(s) IV Intermittent <User Schedule>  warfarin 9 milliGRAM(s) Oral once    MEDICATIONS  (PRN):  acetaminophen  IVPB .. 1000 milliGRAM(s) IV Intermittent once PRN Moderate Pain (4 - 6)  heparin   Injectable 6500 Unit(s) IV Push every 6 hours PRN For aPTT less than 40  heparin   Injectable 3000 Unit(s) IV Push every 6 hours PRN For aPTT between 40 - 57  traMADol 50 milliGRAM(s) Oral every 6 hours PRN Severe Pain (7 - 10)      __________________________________________________  REVIEW OF SYSTEMS:    CONSTITUTIONAL: No fever,   EYES: no acute visual disturbances  NECK: No pain or stiffness  RESPIRATORY: No cough; No shortness of breath  CARDIOVASCULAR: No chest pain, no palpitations  GASTROINTESTINAL: No pain. No nausea or vomiting; No diarrhea   NEUROLOGICAL: No headache or numbness, no tremors  MUSCULOSKELETAL: No joint pain, no muscle pain  GENITOURINARY: no dysuria, no frequency, no hesitancy  PSYCHIATRY: no depression , no anxiety  ALL OTHER  ROS negative        Vital Signs Last 24 Hrs  T(C): 36.6 (10 Jul 2020 08:02), Max: 36.9 (09 Jul 2020 13:45)  T(F): 97.8 (10 Jul 2020 08:02), Max: 98.5 (09 Jul 2020 13:45)  HR: 73 (10 Jul 2020 08:02) (65 - 91)  BP: 107/58 (10 Jul 2020 08:02) (107/58 - 134/104)  BP(mean): --  RR: 18 (10 Jul 2020 08:02) (16 - 18)  SpO2: 100% (10 Jul 2020 08:02) (96% - 100%)    ________________________________________________  PHYSICAL EXAM:  GENERAL: NAD, conversant. alert and awake  HEENT: Normocephalic;  conjunctivae and sclerae clear; moist mucous membranes;   NECK : supple  CHEST/LUNG: Clear to auscultation bilaterally with good air entry   HEART: S1 S2  regular; no murmurs, gallops or rubs  ABDOMEN: Soft, Nontender, Nondistended; Bowel sounds present  EXTREMITIES: no cyanosis; left arm edema. no calf tenderness  SKIN: warm and dry; no rash  NERVOUS SYSTEM:  Awake and alert; Oriented  to place, person      _________________________________________________  LABS:                        12.4   8.52  )-----------( 180      ( 10 Jul 2020 06:13 )             42.3     07-10    137  |  100  |  57<H>  ----------------------------<  128<H>  4.7   |  31  |  7.40<H>    Ca    8.7      10 Jul 2020 06:13    TPro  6.8  /  Alb  2.6<L>  /  TBili  0.5  /  DBili  x   /  AST  20  /  ALT  20  /  AlkPhos  64  07-10    PT/INR - ( 10 Jul 2020 06:13 )   PT: 17.2 sec;   INR: 1.50 ratio         PTT - ( 10 Jul 2020 06:13 )  PTT:134.4 sec    CAPILLARY BLOOD GLUCOSE      POCT Blood Glucose.: 198 mg/dL (10 Jul 2020 11:38)  POCT Blood Glucose.: 117 mg/dL (10 Jul 2020 07:30)  POCT Blood Glucose.: 164 mg/dL (09 Jul 2020 20:49)  POCT Blood Glucose.: 149 mg/dL (09 Jul 2020 17:05)        RADIOLOGY & ADDITIONAL TESTS:    Imaging  Reviewed:  YES    < from: Xray Chest 1 View- PORTABLE-Urgent (07.08.20 @ 12:50) >    EXAM:  XR CHEST PORTABLE URGENT 1V                            PROCEDURE DATE:  07/08/2020          INTERPRETATION:    DATE OF STUDY: 7/8/2020 at 12:41PM    PRIOR: Earlier 7/8/20 exam.    CLINICAL INDICATION: S/P removal of left pigtail catheter.    TECHNIQUE: portable chest.    FINDINGS:   S/P sternotomy.  Left chest pigtail catheter has been removed.  Stable cardiomegaly.  Persistent left pleural effusion with stable lower left lung opacity.  No pneumothorax.    Right lung airspace opacities are stable.    IMPRESSION:   1. S/P removal of left pigtail catheter.  2. No pneumothorax.                LYNDON SÁNCHEZ M.D., ATTENDING RADIOLOGIST  This document has been electronically signed. Jul 8 2020  1:36PM                < end of copied text >    < from: US Duplex Venous Upper Ext Ltd, Left (07.07.20 @ 18:14) >    EXAM:  US DPLX UPR EXT VEINS LTD LT                            PROCEDURE DATE:  07/07/2020          INTERPRETATION:  CLINICAL INFORMATION: Swelling    COMPARISON: 6/30/2020.    TECHNIQUE: Duplex sonography of the LEFT UPPER extremity veins with color and spectral Doppler, with and without compression.      FINDINGS:    The left internal jugular, subclavian, axillary and brachial veins are patent and compressible where applicable.  The cephalic veins a superficial vein is patent and without thrombus. The radial vein is patent. Ulnar and basilic veins are not identified.    Doppler examination shows normal spontaneous and phasic flow.    IMPRESSION:   No evidence of left upper extremity deep venous thrombosis.                        SUSHANT GUZMAN M.D.,ATTENDING RADIOLOGIST  This document has been electronically signed. Jul 7 2020  6:29PM                < end of copied text >      < from: CT Chest No Cont (07.06.20 @ 11:02) >    EXAM:  CT CHEST                            PROCEDURE DATE:  07/06/2020          INTERPRETATION:  CT CHEST    CLINICAL INFORMATION:  left pleural effusion with pig tail catheter in    TECHNIQUE: Noncontrast CT of the chest was performed. Coronal andsagittal images were reconstructed. This study was performed using automatic exposure control (radiation dose reduction software) to obtain a diagnostic image quality scan with patient dose as low as reasonably achievable.    COMPARISON: Same day chest x-ray and chest x-ray one day prior, CT chest 6/27/2020    FINDINGS:    LUNGS, AIRWAYS: The central airways are patent. Pulmonary edema and bibasilar atelectasis.    PLEURA: Trace right pleural effusion has developed. Decreased size of loculated left pleural effusion, now small, with pleural pigtail catheter in place. No pneumothorax.    VESSELS: Aortic atherosclerosis without aneurysm. Left subclavian venous stents.    HEART: Mild cardiomegaly. No pericardial effusion. Coronary artery calcifications are present. Status post CABG.    MEDIASTINUM AND YOANA: No adenopathy.    UPPER ABDOMEN: Small ascites.    BONES AND SOFT TISSUES: No acute bony abnormality. Status post sternotomy. Asymmetric left chest wall edema.    IMPRESSION:     Pulmonaryedema and bibasilar atelectasis    Trace right pleural effusion has developed. Decreased size of loculated left pleural effusion, now small, with pleural pigtail catheter in place. No pneumothorax.                VANNA MAHER M.D., ATTENDING RADIOLOGIST  This document has been electronically signed. Jul 6 2020  3:13PM                < end of copied text >      Consultant(s) Notes Reviewed:   YES    Nephrology, Vascular. cardiology, ID    Plan of care was discussed with patient and /or primary care giver; all questions and concerns were addressed

## 2020-07-10 NOTE — PROGRESS NOTE ADULT - SUBJECTIVE AND OBJECTIVE BOX
Time of Visit:  Patient seen and examined.     MEDICATIONS  (STANDING):  alteplase  Injectable for Pleural Effusion 2 milliGRAM(s) IntraPleural. once  aMIOdarone    Tablet 200 milliGRAM(s) Oral daily  aspirin enteric coated 81 milliGRAM(s) Oral daily  calcium acetate 667 milliGRAM(s) Oral three times a day with meals  chlorhexidine 2% Cloths 1 Application(s) Topical daily  dextrose 5%. 1000 milliLiter(s) (50 mL/Hr) IV Continuous <Continuous>  dornase danilo Solution for Pleural Effusion 5 milliGRAM(s) IntraPleural. once  dornase danilo Solution for Pleural Effusion 5 milliGRAM(s) IntraPleural. once  heparin  Infusion.  Unit(s)/Hr (15 mL/Hr) IV Continuous <Continuous>  insulin lispro (HumaLOG) corrective regimen sliding scale   SubCutaneous three times a day before meals  insulin lispro (HumaLOG) corrective regimen sliding scale   SubCutaneous at bedtime  ketotifen 0.025% Ophthalmic Solution 1 Drop(s) Both EYES two times a day  methimazole 5 milliGRAM(s) Oral daily  metoprolol tartrate 100 milliGRAM(s) Oral two times a day  pantoprazole    Tablet 40 milliGRAM(s) Oral before breakfast  senna 2 Tablet(s) Oral at bedtime  simvastatin 40 milliGRAM(s) Oral at bedtime  vancomycin  IVPB 1000 milliGRAM(s) IV Intermittent <User Schedule>  warfarin 9 milliGRAM(s) Oral once      MEDICATIONS  (PRN):  acetaminophen  IVPB .. 1000 milliGRAM(s) IV Intermittent once PRN Moderate Pain (4 - 6)  heparin   Injectable 6500 Unit(s) IV Push every 6 hours PRN For aPTT less than 40  heparin   Injectable 3000 Unit(s) IV Push every 6 hours PRN For aPTT between 40 - 57  traMADol 50 milliGRAM(s) Oral every 6 hours PRN Severe Pain (7 - 10)       Medications up to date at time of exam.      PHYSICAL EXAMINATION:  Patient has no new complaints.  GENERAL: The patient is a well-developed, well-nourished, in no apparent distress.     Vital Signs Last 24 Hrs  T(C): 36.4 (10 Jul 2020 15:45), Max: 36.7 (10 Jul 2020 00:33)  T(F): 97.6 (10 Jul 2020 15:45), Max: 98 (10 Jul 2020 00:33)  HR: 79 (10 Jul 2020 15:45) (65 - 82)  BP: 113/57 (10 Jul 2020 15:45) (107/58 - 126/67)  BP(mean): 75 (10 Jul 2020 13:15) (75 - 83)  RR: 17 (10 Jul 2020 15:45) (17 - 18)  SpO2: 97% (10 Jul 2020 15:45) (96% - 100%)   (if applicable)    Chest Tube (if applicable)    HEENT: Head is normocephalic and atraumatic. Extraocular muscles are intact. Mucous membranes are moist.     NECK: Supple, no palpable adenopathy.    LUNGS: Clear to auscultation, no wheezing, rales, or rhonchi.    HEART: Regular rate and rhythm without murmur.    ABDOMEN: Soft, nontender, and nondistended.  No hepatosplenomegaly is noted.    : No painful voiding, no pelvic pain    EXTREMITIES: Without any cyanosis, clubbing, rash, lesions or+2 LUE edema.    NEUROLOGIC: Awake, alert, oriented, grossly intact    SKIN: Warm, dry, good turgor.      LABS:                        12.4   8.52  )-----------( 180      ( 10 Jul 2020 06:13 )             42.3     07-10    137  |  100  |  57<H>  ----------------------------<  128<H>  4.7   |  31  |  7.40<H>    Ca    8.7      10 Jul 2020 06:13    TPro  6.8  /  Alb  2.6<L>  /  TBili  0.5  /  DBili  x   /  AST  20  /  ALT  20  /  AlkPhos  64  07-10    PT/INR - ( 10 Jul 2020 06:13 )   PT: 17.2 sec;   INR: 1.50 ratio         PTT - ( 10 Jul 2020 16:44 )  PTT:55.8 sec                    MICROBIOLOGY: (if applicable)    RADIOLOGY & ADDITIONAL STUDIES:  EKG:   CXR:  ECHO:    IMPRESSION: 69y Male PAST MEDICAL & SURGICAL HISTORY:  ESRD (end stage renal disease) on dialysis: T-Th-Sat  Stented coronary artery: total 15 stents from 0630-9591  Hyperthyroidism  H/O: CVA: after cardiac stent placed 12/15/09 -no residual  Heart Attack: 2/1/07 with subsequent stent  Coronary Stent: [id number] 10 stents,  to Ramus 1/2015, cath 01/2016 ( see results in HPI)  Hypercholesterolemia  CAD (Coronary Artery Disease)  DM (Diabetes Mellitus): x 4 yrs without N/N/R  HTN (Hypertension)  S/P CABG x 4  Hemodialysis access, AV graft: 5/2015, L arm  S/P cataract extraction  Boil of Buttock: 2006 and 2008   p/w           IMP: This is a  69 man  with CAD s/p multiple stents, s/p CABG (9/2019 course c/b Afib and LT pleural effusion), Diastolic CHF, HTN, HLD, DM2, Afib on coumadin and ESRD on HD (Tu, Thurs, Sat at [facility]), hyperthyroidism, presented to ER for pain and swelling of left leg since 3 days. Admitted to medicine for LLE cellulitis.  CXR shows gross heart enlargement and mild to moderate left base pleural pulmonary reaction again noted. Sternotomy again seen. Negative for DVT on Doppler study. Blood Cx NGTD. Pt was started on Unasyn and po Doxy, but changed to Vancomycin due to non healing cellulitis. ID consulted.  Improved leg swelling and erythema with Vanco.   Pt. with ESRD on HD, followed by nephro Dr. Calle, HD tolerated. Pt. with significant cardiac hx including AF on Coumadin, followed by cardiology. Coumadin dose adjusted for goal INR 2-3. ECHO resulted EF 40-45% (previous EF 55% in Jan. 2020.  CT chest reveal enlarging left loculated pleural effusion. Pat is not in any resp distress and O2 sat 100 % 2 LPM via NC. He will require chest tube placement but INR is elevated due to coumadin. Thoracic surgery unable to place chest tube, s/p thoracentesis 6/30 .. 150 ml . Post procedure cxr neg for ptx.  IR placed Left chest tube 7/2. repeated cxr, no improvement .       Sugg;  -chest tube d/c 7/8  -post chest tube removal cxr no PTX  -f/u pleural fluid cytology  -dialysis as per neph  -taper  O2 via NC as needed      d/c with P yeturu

## 2020-07-10 NOTE — PROGRESS NOTE ADULT - ASSESSMENT
Patient is a 69y Male whom presented to the hospital with LT leg pain and swelling.  Was sent from cardiology clinic where he had gone for routine evaluation as was noted  to have RT leg swelling, to R/O DVT. ( doppler US negative for DVT).admitted for treatment of leg cellulitis   Renal consulted for ESRD. Large left loculated pleural effusion s/p L pigtail catheter.     A/P  #ESRD  HD in AM. LUE swelling noted, no issues cannulating AV access. will need angioplasty as outpatient  # Anemia of CKD. stable off ROSARIO  # CKDMBD cont phoslo.  # Hypertension controlled

## 2020-07-10 NOTE — PROGRESS NOTE ADULT - PROBLEM SELECTOR PLAN 1
s/p pigtail insertion by IR on 7/2  s/p TPA on 7/7   Removed CT pig tail 7/8  Cardiology dr. Colin following  Followed by Pulm Dr. Delaney   Thoracic surgery following  post pigtail removal Xray stable w/o pneumothorax  resumed Heparin and Coumadin as per CT surgery  monitor nomogram with heparin drip  plan to keep heparin drip until INR reaches therapeutic as per attending.  INR 1.5 today.   increase coumadin 9mg.

## 2020-07-10 NOTE — PROGRESS NOTE ADULT - PROBLEM SELECTOR PLAN 7
rate controlled on Metoprolol   cont Amiodarone   c/w Coumadin  INR 1.43- increased coumadin to 7mg today.   f/u cardiology dr. Colin  Heparin drip was started for CT but not removed.   plan to keep heparin drip until INR 2-3 for bridging w/ Coumadin. discussed with attending.

## 2020-07-10 NOTE — PROGRESS NOTE ADULT - SUBJECTIVE AND OBJECTIVE BOX
Patient denies CP,SOB in good spirits today Review of systems otherwise (-)    acetaminophen  IVPB .. 1000 milliGRAM(s) IV Intermittent once PRN  alteplase  Injectable for Pleural Effusion 2 milliGRAM(s) IntraPleural. once  aMIOdarone    Tablet 200 milliGRAM(s) Oral daily  aspirin enteric coated 81 milliGRAM(s) Oral daily  calcium acetate 667 milliGRAM(s) Oral three times a day with meals  chlorhexidine 2% Cloths 1 Application(s) Topical daily  dextrose 5%. 1000 milliLiter(s) IV Continuous <Continuous>  dornase danilo Solution for Pleural Effusion 5 milliGRAM(s) IntraPleural. once  dornase danilo Solution for Pleural Effusion 5 milliGRAM(s) IntraPleural. once  heparin   Injectable 6500 Unit(s) IV Push every 6 hours PRN  heparin   Injectable 3000 Unit(s) IV Push every 6 hours PRN  heparin  Infusion.  Unit(s)/Hr IV Continuous <Continuous>  insulin lispro (HumaLOG) corrective regimen sliding scale   SubCutaneous three times a day before meals  insulin lispro (HumaLOG) corrective regimen sliding scale   SubCutaneous at bedtime  ketotifen 0.025% Ophthalmic Solution 1 Drop(s) Both EYES two times a day  methimazole 5 milliGRAM(s) Oral daily  metoprolol tartrate 100 milliGRAM(s) Oral two times a day  pantoprazole    Tablet 40 milliGRAM(s) Oral before breakfast  senna 2 Tablet(s) Oral at bedtime  simvastatin 40 milliGRAM(s) Oral at bedtime  traMADol 50 milliGRAM(s) Oral every 6 hours PRN  vancomycin  IVPB 1000 milliGRAM(s) IV Intermittent <User Schedule>                            12.4   8.52  )-----------( 180      ( 10 Jul 2020 06:13 )             42.3       Hemoglobin: 12.4 g/dL (07-10 @ 06:13)  Hemoglobin: 11.8 g/dL (07-09 @ 10:50)  Hemoglobin: 11.9 g/dL (07-08 @ 21:53)  Hemoglobin: 12.1 g/dL (07-08 @ 06:17)  Hemoglobin: 12.4 g/dL (07-07 @ 07:05)      07-10    137  |  100  |  57<H>  ----------------------------<  128<H>  4.7   |  31  |  7.40<H>    Ca    8.7      10 Jul 2020 06:13    TPro  6.8  /  Alb  2.6<L>  /  TBili  0.5  /  DBili  x   /  AST  20  /  ALT  20  /  AlkPhos  64  07-10    Creatinine Trend: 7.40<--, 9.51<--, 7.98<--, 10.00<--, 8.66<--, 7.39<--    COAGS: PT/INR - ( 10 Jul 2020 06:13 )   PT: 17.2 sec;   INR: 1.50 ratio         PTT - ( 10 Jul 2020 06:13 )  PTT:134.4 sec          T(C): 36.6 (07-10-20 @ 08:02), Max: 36.9 (07-09-20 @ 13:45)  HR: 73 (07-10-20 @ 08:02) (65 - 93)  BP: 107/58 (07-10-20 @ 08:02) (107/58 - 134/104)  RR: 18 (07-10-20 @ 08:02) (16 - 18)  SpO2: 100% (07-10-20 @ 08:02) (96% - 100%)  Wt(kg): --    I&O's Summary    09 Jul 2020 07:01  -  10 Jul 2020 07:00  --------------------------------------------------------  IN: 400 mL / OUT: 2400 mL / NET: -2000 mL          Gen: Appears well in NAD  HEENT:  (-)icterus (-)pallor  CV: N S1 S2 1/6 DEJAH (+)2 Pulses B/l  Resp:  Clear to ausculatation B/L, normal effort  GI: (+) BS Soft, NT, ND  Lymph:  (-)lower extremity edema (+) left upper extremity swelling, (-)obvious lymphadenopathy  Skin: Warm to touch, Normal turgor  Psych: Appropriate mood and affect        ASSESSMENT/PLAN: 69y Male  CAD s/p multiple stents, s/p CABG (9/2019 course c/b Afib and LT pleural effusion), Normal LV systolic function, severe Diastolic, RV dysfx CHF, HTN, HLD, DM2, Afib on coumadin and ESRD on HD (Tu, Thurs, Sat at [facility]), hyperthyroidism, presented to ER for pain and swelling of left leg since 3 days.    - LE dopplers negative for dVT   - s/p pigtail drain of left pleural effusion, s/p TPA 7/7  - Chest tube d/c'd pulmonary f/u noted  - Restarted on heparin gtt and coumadin, goal INR 2-3  - continue with medical management of known CAD with stents with sapt   - Vascular eval noted, oupt f/u with Jessi Reynolds  - oupt f/u with Dr. Méndez for cardiology    Murphy Colin MD, [facility]  BEEPER [phone]

## 2020-07-11 LAB
ALBUMIN SERPL ELPH-MCNC: 2.7 G/DL — LOW (ref 3.5–5)
ALP SERPL-CCNC: 62 U/L — SIGNIFICANT CHANGE UP (ref 40–120)
ALT FLD-CCNC: 21 U/L DA — SIGNIFICANT CHANGE UP (ref 10–60)
ANION GAP SERPL CALC-SCNC: 10 MMOL/L — SIGNIFICANT CHANGE UP (ref 5–17)
APTT BLD: 119.6 SEC — HIGH (ref 27.5–35.5)
APTT BLD: 74.4 SEC — HIGH (ref 27.5–35.5)
APTT BLD: 86.3 SEC — HIGH (ref 27.5–35.5)
AST SERPL-CCNC: 18 U/L — SIGNIFICANT CHANGE UP (ref 10–40)
BASOPHILS # BLD AUTO: 0.07 K/UL — SIGNIFICANT CHANGE UP (ref 0–0.2)
BASOPHILS NFR BLD AUTO: 0.8 % — SIGNIFICANT CHANGE UP (ref 0–2)
BILIRUB SERPL-MCNC: 0.5 MG/DL — SIGNIFICANT CHANGE UP (ref 0.2–1.2)
BUN SERPL-MCNC: 73 MG/DL — HIGH (ref 7–18)
CALCIUM SERPL-MCNC: 8.7 MG/DL — SIGNIFICANT CHANGE UP (ref 8.4–10.5)
CHLORIDE SERPL-SCNC: 97 MMOL/L — SIGNIFICANT CHANGE UP (ref 96–108)
CO2 SERPL-SCNC: 27 MMOL/L — SIGNIFICANT CHANGE UP (ref 22–31)
CREAT SERPL-MCNC: 8.63 MG/DL — HIGH (ref 0.5–1.3)
EOSINOPHIL # BLD AUTO: 0.24 K/UL — SIGNIFICANT CHANGE UP (ref 0–0.5)
EOSINOPHIL NFR BLD AUTO: 2.8 % — SIGNIFICANT CHANGE UP (ref 0–6)
GLUCOSE BLDC GLUCOMTR-MCNC: 124 MG/DL — HIGH (ref 70–99)
GLUCOSE BLDC GLUCOMTR-MCNC: 137 MG/DL — HIGH (ref 70–99)
GLUCOSE BLDC GLUCOMTR-MCNC: 142 MG/DL — HIGH (ref 70–99)
GLUCOSE BLDC GLUCOMTR-MCNC: 165 MG/DL — HIGH (ref 70–99)
GLUCOSE SERPL-MCNC: 134 MG/DL — HIGH (ref 70–99)
HCT VFR BLD CALC: 43.2 % — SIGNIFICANT CHANGE UP (ref 39–50)
HGB BLD-MCNC: 12.6 G/DL — LOW (ref 13–17)
IMM GRANULOCYTES NFR BLD AUTO: 2 % — HIGH (ref 0–1.5)
INR BLD: 2.02 RATIO — HIGH (ref 0.88–1.16)
LYMPHOCYTES # BLD AUTO: 0.9 K/UL — LOW (ref 1–3.3)
LYMPHOCYTES # BLD AUTO: 10.7 % — LOW (ref 13–44)
MCHC RBC-ENTMCNC: 28.4 PG — SIGNIFICANT CHANGE UP (ref 27–34)
MCHC RBC-ENTMCNC: 29.2 GM/DL — LOW (ref 32–36)
MCV RBC AUTO: 97.5 FL — SIGNIFICANT CHANGE UP (ref 80–100)
MONOCYTES # BLD AUTO: 1.1 K/UL — HIGH (ref 0–0.9)
MONOCYTES NFR BLD AUTO: 13 % — SIGNIFICANT CHANGE UP (ref 2–14)
NEUTROPHILS # BLD AUTO: 5.95 K/UL — SIGNIFICANT CHANGE UP (ref 1.8–7.4)
NEUTROPHILS NFR BLD AUTO: 70.7 % — SIGNIFICANT CHANGE UP (ref 43–77)
NRBC # BLD: 0 /100 WBCS — SIGNIFICANT CHANGE UP (ref 0–0)
PHOSPHATE SERPL-MCNC: 5.6 MG/DL — HIGH (ref 2.5–4.5)
PLATELET # BLD AUTO: 175 K/UL — SIGNIFICANT CHANGE UP (ref 150–400)
POTASSIUM SERPL-MCNC: 4.7 MMOL/L — SIGNIFICANT CHANGE UP (ref 3.5–5.3)
POTASSIUM SERPL-SCNC: 4.7 MMOL/L — SIGNIFICANT CHANGE UP (ref 3.5–5.3)
PROT SERPL-MCNC: 6.7 G/DL — SIGNIFICANT CHANGE UP (ref 6–8.3)
PROTHROM AB SERPL-ACNC: 22.9 SEC — HIGH (ref 10.6–13.6)
RBC # BLD: 4.43 M/UL — SIGNIFICANT CHANGE UP (ref 4.2–5.8)
RBC # FLD: 15.9 % — HIGH (ref 10.3–14.5)
SODIUM SERPL-SCNC: 134 MMOL/L — LOW (ref 135–145)
WBC # BLD: 8.43 K/UL — SIGNIFICANT CHANGE UP (ref 3.8–10.5)
WBC # FLD AUTO: 8.43 K/UL — SIGNIFICANT CHANGE UP (ref 3.8–10.5)

## 2020-07-11 RX ORDER — WARFARIN SODIUM 2.5 MG/1
5 TABLET ORAL ONCE
Refills: 0 | Status: COMPLETED | OUTPATIENT
Start: 2020-07-11 | End: 2020-07-11

## 2020-07-11 RX ORDER — ACETAMINOPHEN 500 MG
650 TABLET ORAL EVERY 6 HOURS
Refills: 0 | Status: DISCONTINUED | OUTPATIENT
Start: 2020-07-11 | End: 2020-07-15

## 2020-07-11 RX ADMIN — Medication 667 MILLIGRAM(S): at 18:26

## 2020-07-11 RX ADMIN — PANTOPRAZOLE SODIUM 40 MILLIGRAM(S): 20 TABLET, DELAYED RELEASE ORAL at 05:55

## 2020-07-11 RX ADMIN — Medication 667 MILLIGRAM(S): at 11:51

## 2020-07-11 RX ADMIN — SIMVASTATIN 40 MILLIGRAM(S): 20 TABLET, FILM COATED ORAL at 21:40

## 2020-07-11 RX ADMIN — AMIODARONE HYDROCHLORIDE 200 MILLIGRAM(S): 400 TABLET ORAL at 05:55

## 2020-07-11 RX ADMIN — HEPARIN SODIUM 900 UNIT(S)/HR: 5000 INJECTION INTRAVENOUS; SUBCUTANEOUS at 07:18

## 2020-07-11 RX ADMIN — KETOTIFEN FUMARATE 1 DROP(S): 0.34 SOLUTION OPHTHALMIC at 18:27

## 2020-07-11 RX ADMIN — Medication 100 MILLIGRAM(S): at 05:55

## 2020-07-11 RX ADMIN — WARFARIN SODIUM 5 MILLIGRAM(S): 2.5 TABLET ORAL at 21:40

## 2020-07-11 RX ADMIN — SENNA PLUS 2 TABLET(S): 8.6 TABLET ORAL at 21:40

## 2020-07-11 RX ADMIN — KETOTIFEN FUMARATE 1 DROP(S): 0.34 SOLUTION OPHTHALMIC at 05:55

## 2020-07-11 RX ADMIN — Medication 1: at 11:52

## 2020-07-11 RX ADMIN — HEPARIN SODIUM 900 UNIT(S)/HR: 5000 INJECTION INTRAVENOUS; SUBCUTANEOUS at 02:19

## 2020-07-11 RX ADMIN — CHLORHEXIDINE GLUCONATE 1 APPLICATION(S): 213 SOLUTION TOPICAL at 11:56

## 2020-07-11 RX ADMIN — Medication 81 MILLIGRAM(S): at 11:51

## 2020-07-11 RX ADMIN — Medication 250 MILLIGRAM(S): at 16:19

## 2020-07-11 RX ADMIN — Medication 667 MILLIGRAM(S): at 08:24

## 2020-07-11 NOTE — PROGRESS NOTE ADULT - ASSESSMENT
Patient is a 69y Male whom presented to the hospital with LT leg pain and swelling.  Was sent from cardiology clinic where he had gone for routine evaluation as was noted  to have RT leg swelling, to R/O DVT. ( doppler US negative for DVT).admitted for treatment of leg cellulitis   Renal consulted for ESRD. Large left loculated pleural effusion s/p L pigtail catheter.     A/P  #ESRD  HD today . LUE swelling noted with two ulcers. Vascular evaluation prior to next HD ,  # Anemia of CKD. stable off ROSARIO  # CKDMBD cont phoslo.  # Hypertension controlled

## 2020-07-11 NOTE — PROGRESS NOTE ADULT - ATTENDING COMMENTS
Rudy Mendiola MD  [facility]  Office [phone]  Ans Serv [phone] cell - [phone]

## 2020-07-11 NOTE — PROGRESS NOTE ADULT - SUBJECTIVE AND OBJECTIVE BOX
pt seen and examined, no complaints, ROS - .          acetaminophen   Tablet .. 650 milliGRAM(s) Oral every 6 hours PRN  alteplase  Injectable for Pleural Effusion 2 milliGRAM(s) IntraPleural. once  aMIOdarone    Tablet 200 milliGRAM(s) Oral daily  aspirin enteric coated 81 milliGRAM(s) Oral daily  calcium acetate 667 milliGRAM(s) Oral three times a day with meals  chlorhexidine 2% Cloths 1 Application(s) Topical daily  dextrose 5%. 1000 milliLiter(s) IV Continuous <Continuous>  dornase danilo Solution for Pleural Effusion 5 milliGRAM(s) IntraPleural. once  dornase danilo Solution for Pleural Effusion 5 milliGRAM(s) IntraPleural. once  heparin   Injectable 6500 Unit(s) IV Push every 6 hours PRN  heparin   Injectable 3000 Unit(s) IV Push every 6 hours PRN  heparin  Infusion.  Unit(s)/Hr IV Continuous <Continuous>  insulin lispro (HumaLOG) corrective regimen sliding scale   SubCutaneous three times a day before meals  insulin lispro (HumaLOG) corrective regimen sliding scale   SubCutaneous at bedtime  ketotifen 0.025% Ophthalmic Solution 1 Drop(s) Both EYES two times a day  methimazole 5 milliGRAM(s) Oral daily  metoprolol tartrate 100 milliGRAM(s) Oral two times a day  pantoprazole    Tablet 40 milliGRAM(s) Oral before breakfast  senna 2 Tablet(s) Oral at bedtime  simvastatin 40 milliGRAM(s) Oral at bedtime  traMADol 50 milliGRAM(s) Oral every 6 hours PRN  vancomycin  IVPB 1000 milliGRAM(s) IV Intermittent <User Schedule>                            12.6   8.43  )-----------( 175      ( 11 Jul 2020 06:21 )             43.2       Hemoglobin: 12.6 g/dL (07-11 @ 06:21)  Hemoglobin: 12.4 g/dL (07-10 @ 06:13)  Hemoglobin: 11.8 g/dL (07-09 @ 10:50)  Hemoglobin: 11.9 g/dL (07-08 @ 21:53)  Hemoglobin: 12.1 g/dL (07-08 @ 06:17)      07-11    134<L>  |  97  |  73<H>  ----------------------------<  134<H>  4.7   |  27  |  8.63<H>    Ca    8.7      11 Jul 2020 06:21  Phos  5.6     07-11    TPro  6.7  /  Alb  2.7<L>  /  TBili  0.5  /  DBili  x   /  AST  18  /  ALT  21  /  AlkPhos  62  07-11    Creatinine Trend: 8.63<--, 7.40<--, 9.51<--, 7.98<--, 10.00<--, 8.66<--    COAGS: PT/INR - ( 11 Jul 2020 06:21 )   PT: 22.9 sec;   INR: 2.02 ratio         PTT - ( 11 Jul 2020 06:21 )  PTT:86.3 sec          T(C): 36.7 (07-11-20 @ 00:37), Max: 36.7 (07-11-20 @ 00:37)  HR: 85 (07-11-20 @ 05:20) (78 - 85)  BP: 119/70 (07-11-20 @ 05:20) (110/64 - 126/67)  RR: 18 (07-11-20 @ 00:37) (17 - 18)  SpO2: 93% (07-11-20 @ 00:37) (93% - 97%)  Wt(kg): --    I&O's Summary    Gen: Appears well in NAD  HEENT:  (-)icterus (-)pallor  CV: N S1 S2 1/6 DEJAH (+)2 Pulses B/l  Resp:  Clear to ausculatation B/L, normal effort  GI: (+) BS Soft, NT, ND  Lymph:  (-)lower extremity edema (+) left upper extremity swelling, (-)obvious lymphadenopathy  Skin: Warm to touch, Normal turgor  Psych: Appropriate mood and affect        ASSESSMENT/PLAN: 69y Male  CAD s/p multiple stents, s/p CABG (9/2019 course c/b Afib and LT pleural effusion), Normal LV systolic function, severe Diastolic, RV dysfx CHF, HTN, HLD, DM2, Afib on coumadin and ESRD on HD (Tu, Thurs, Sat at [facility]), hyperthyroidism, presented to ER for pain and swelling of left leg since 3 days.    - LE dopplers negative for dVT    - A/c with heparin gtt and coumadin, goal INR 2-3  - continue with medical management of known CAD with stents with sapt   - Vascular eval noted, oupt f/u with Jessi Reynolds  - kristelpt f/u with Dr. Méndez for cardiology

## 2020-07-11 NOTE — PROGRESS NOTE ADULT - SUBJECTIVE AND OBJECTIVE BOX
Time of Visit:  Patient seen and examined.     MEDICATIONS  (STANDING):  alteplase  Injectable for Pleural Effusion 2 milliGRAM(s) IntraPleural. once  aMIOdarone    Tablet 200 milliGRAM(s) Oral daily  aspirin enteric coated 81 milliGRAM(s) Oral daily  calcium acetate 667 milliGRAM(s) Oral three times a day with meals  chlorhexidine 2% Cloths 1 Application(s) Topical daily  dextrose 5%. 1000 milliLiter(s) (50 mL/Hr) IV Continuous <Continuous>  dornase danilo Solution for Pleural Effusion 5 milliGRAM(s) IntraPleural. once  dornase danilo Solution for Pleural Effusion 5 milliGRAM(s) IntraPleural. once  heparin  Infusion.  Unit(s)/Hr (15 mL/Hr) IV Continuous <Continuous>  insulin lispro (HumaLOG) corrective regimen sliding scale   SubCutaneous three times a day before meals  insulin lispro (HumaLOG) corrective regimen sliding scale   SubCutaneous at bedtime  ketotifen 0.025% Ophthalmic Solution 1 Drop(s) Both EYES two times a day  methimazole 5 milliGRAM(s) Oral daily  metoprolol tartrate 100 milliGRAM(s) Oral two times a day  pantoprazole    Tablet 40 milliGRAM(s) Oral before breakfast  senna 2 Tablet(s) Oral at bedtime  simvastatin 40 milliGRAM(s) Oral at bedtime  vancomycin  IVPB 1000 milliGRAM(s) IV Intermittent <User Schedule>      MEDICATIONS  (PRN):  acetaminophen   Tablet .. 650 milliGRAM(s) Oral every 6 hours PRN Moderate Pain (4 - 6)  heparin   Injectable 6500 Unit(s) IV Push every 6 hours PRN For aPTT less than 40  heparin   Injectable 3000 Unit(s) IV Push every 6 hours PRN For aPTT between 40 - 57  traMADol 50 milliGRAM(s) Oral every 6 hours PRN Severe Pain (7 - 10)       Medications up to date at time of exam.      PHYSICAL EXAMINATION:  Patient has no new complaints.  GENERAL: The patient is a well-developed, well-nourished, in no apparent distress.     Vital Signs Last 24 Hrs  T(C): 36.8 (11 Jul 2020 14:20), Max: 36.8 (11 Jul 2020 14:20)  T(F): 98.3 (11 Jul 2020 14:20), Max: 98.3 (11 Jul 2020 14:20)  HR: 74 (11 Jul 2020 14:20) (74 - 85)  BP: 100/64 (11 Jul 2020 14:20) (99/50 - 119/70)  BP(mean): --  RR: 20 (11 Jul 2020 14:20) (18 - 20)  SpO2: 100% (11 Jul 2020 14:20) (93% - 100%)   (if applicable)    Chest Tube (if applicable)    HEENT: Head is normocephalic and atraumatic. Extraocular muscles are intact. Mucous membranes are moist.     NECK: Supple, no palpable adenopathy.    LUNGS: Clear to auscultation, no wheezing, rales, or rhonchi.    HEART: Regular rate and rhythm without murmur.    ABDOMEN: Soft, nontender, and nondistended.  No hepatosplenomegaly is noted.    : No painful voiding, no pelvic pain    EXTREMITIES: Without any cyanosis, clubbing, rash, lesions . + LUE +2  edema.    NEUROLOGIC: Awake, alert, oriented, grossly intact    SKIN: Warm, dry, good turgor.      LABS:                        12.6   8.43  )-----------( 175      ( 11 Jul 2020 06:21 )             43.2     07-11    134<L>  |  97  |  73<H>  ----------------------------<  134<H>  4.7   |  27  |  8.63<H>    Ca    8.7      11 Jul 2020 06:21  Phos  5.6     07-11    TPro  6.7  /  Alb  2.7<L>  /  TBili  0.5  /  DBili  x   /  AST  18  /  ALT  21  /  AlkPhos  62  07-11    PT/INR - ( 11 Jul 2020 06:21 )   PT: 22.9 sec;   INR: 2.02 ratio         PTT - ( 11 Jul 2020 14:36 )  PTT:74.4 sec                    MICROBIOLOGY: (if applicable)    RADIOLOGY & ADDITIONAL STUDIES:  EKG:   CXR:  ECHO:    IMPRESSION: 69y Male PAST MEDICAL & SURGICAL HISTORY:  ESRD (end stage renal disease) on dialysis: T-Th-Sat  Stented coronary artery: total 15 stents from 9602-4951  Hyperthyroidism  H/O: CVA: after cardiac stent placed 12/15/09 -no residual  Heart Attack: 2/1/07 with subsequent stent  Coronary Stent: 2887-6195 10 stents,  to Ramus 1/2015, cath 01/2016 ( see results in HPI)  Hypercholesterolemia  CAD (Coronary Artery Disease)  DM (Diabetes Mellitus): x 4 yrs without N/N/R  HTN (Hypertension)  S/P CABG x 4  Hemodialysis access, AV graft: 5/2015, L arm  S/P cataract extraction  Boil of Buttock: 2006 and 2008   p/w             IMP: This is a  69 man  with CAD s/p multiple stents, s/p CABG (9/2019 course c/b Afib and LT pleural effusion), Diastolic CHF, HTN, HLD, DM2, Afib on coumadin and ESRD on HD (Tu, Thurs, Sat at [facility]), hyperthyroidism, presented to ER for pain and swelling of left leg since 3 days. Admitted to medicine for LLE cellulitis.  CXR shows gross heart enlargement and mild to moderate left base pleural pulmonary reaction again noted. Sternotomy again seen. Negative for DVT on Doppler study. Blood Cx NGTD. Pt was started on Unasyn and po Doxy, but changed to Vancomycin due to non healing cellulitis. ID consulted.  Improved leg swelling and erythema with Vanco.   Pt. with ESRD on HD, followed by nephro Dr. Calle, HD tolerated. Pt. with significant cardiac hx including AF on Coumadin, followed by cardiology. Coumadin dose adjusted for goal INR 2-3. ECHO resulted EF 40-45% (previous EF 55% in Jan. 2020.  CT chest reveal enlarging left loculated pleural effusion. Pat is not in any resp distress and O2 sat 100 % 2 LPM via NC. He will require chest tube placement but INR is elevated due to coumadin. Thoracic surgery unable to place chest tube, s/p thoracentesis 6/30 .. 150 ml . Post procedure cxr neg for ptx.  IR placed Left chest tube 7/2. repeated cxr, no improvement .       Sugg;  -chest tube d/c 7/8  -post chest tube removal cxr no PTX  -f/u pleural fluid cytology  -dialysis as per neph  -d/c  O2

## 2020-07-11 NOTE — PROGRESS NOTE ADULT - SUBJECTIVE AND OBJECTIVE BOX
Patient is a 69y old  Male who presents with a chief complaint of leg pain (2020 08:26)    PATIENT IS SEEN AND EXAMINED IN MEDICAL FLOOR.    ALLERGIES:  lisinopril (Other)  opioid-like analgesics (Other)      Daily     Daily Weight in k.1 (2020 05:20)    VITALS:    Vital Signs Last 24 Hrs  T(C): 36.5 (2020 08:29), Max: 36.7 (2020 00:37)  T(F): 97.7 (2020 08:29), Max: 98.1 (2020 00:37)  HR: 76 (2020 08:29) (76 - 85)  BP: 99/50 (2020 08:29) (99/50 - 126/67)  BP(mean): 75 (10 Jul 2020 13:15) (75 - 83)  RR: 20 (2020 08:29) (17 - 20)  SpO2: 99% (2020 08:29) (93% - 99%)    LABS:    CBC Full  -  ( : )  WBC Count : 8.43 K/uL  RBC Count : 4.43 M/uL  Hemoglobin : 12.6 g/dL  Hematocrit : 43.2 %  Platelet Count - Automated : 175 K/uL  Mean Cell Volume : 97.5 fl  Mean Cell Hemoglobin : 28.4 pg  Mean Cell Hemoglobin Concentration : 29.2 gm/dL  Auto Neutrophil # : 5.95 K/uL  Auto Lymphocyte # : 0.90 K/uL  Auto Monocyte # : 1.10 K/uL  Auto Eosinophil # : 0.24 K/uL  Auto Basophil # : 0.07 K/uL  Auto Neutrophil % : 70.7 %  Auto Lymphocyte % : 10.7 %  Auto Monocyte % : 13.0 %  Auto Eosinophil % : 2.8 %  Auto Basophil % : 0.8 %    PT/INR - ( 2020 06:21 )   PT: 22.9 sec;   INR: 2.02 ratio         PTT - ( : )  PTT:86.3 sec  -    134<L>  |  97  |  73<H>  ----------------------------<  134<H>  4.7   |  27  |  8.63<H>    Ca    8.7      2020 06:21  Phos  5.6     07-11    TPro  6.7  /  Alb  2.7<L>  /  TBili  0.5  /  DBili  x   /  AST  18  /  ALT  21  /  AlkPhos  62  07-11    CAPILLARY BLOOD GLUCOSE      POCT Blood Glucose.: 137 mg/dL (2020 08:21)  POCT Blood Glucose.: 143 mg/dL (10 Jul 2020 21:25)  POCT Blood Glucose.: 190 mg/dL (10 Jul 2020 16:31)  POCT Blood Glucose.: 198 mg/dL (10 Jul 2020 11:38)        LIVER FUNCTIONS - ( 2020 06:21 )  Alb: 2.7 g/dL / Pro: 6.7 g/dL / ALK PHOS: 62 U/L / ALT: 21 U/L DA / AST: 18 U/L / GGT: x           Creatinine Trend: 8.63<--, 7.40<--, 9.51<--, 7.98<--, 10.00<--, 8.66<--  I&O's Summary    .Body Fluid pleural left   @ 23:29   No growth at 5 days  --    No polymorphonuclear leukocytes seen  No organisms seen  by cytocentrifuge      .Body Fluid Pleural Fluid   @ 22:58   No growth at 1 week.  --  --      .Blood Blood   @ 22:15   No Growth Final  --  --    MEDICATIONS:    MEDICATIONS  (STANDING):  alteplase  Injectable for Pleural Effusion 2 milliGRAM(s) IntraPleural. once  aMIOdarone    Tablet 200 milliGRAM(s) Oral daily  aspirin enteric coated 81 milliGRAM(s) Oral daily  calcium acetate 667 milliGRAM(s) Oral three times a day with meals  chlorhexidine 2% Cloths 1 Application(s) Topical daily  dextrose 5%. 1000 milliLiter(s) (50 mL/Hr) IV Continuous <Continuous>  dornase danilo Solution for Pleural Effusion 5 milliGRAM(s) IntraPleural. once  dornase danilo Solution for Pleural Effusion 5 milliGRAM(s) IntraPleural. once  heparin  Infusion.  Unit(s)/Hr (15 mL/Hr) IV Continuous <Continuous>  insulin lispro (HumaLOG) corrective regimen sliding scale   SubCutaneous three times a day before meals  insulin lispro (HumaLOG) corrective regimen sliding scale   SubCutaneous at bedtime  ketotifen 0.025% Ophthalmic Solution 1 Drop(s) Both EYES two times a day  methimazole 5 milliGRAM(s) Oral daily  metoprolol tartrate 100 milliGRAM(s) Oral two times a day  pantoprazole    Tablet 40 milliGRAM(s) Oral before breakfast  senna 2 Tablet(s) Oral at bedtime  simvastatin 40 milliGRAM(s) Oral at bedtime  vancomycin  IVPB 1000 milliGRAM(s) IV Intermittent <User Schedule>      MEDICATIONS  (PRN):  acetaminophen   Tablet .. 650 milliGRAM(s) Oral every 6 hours PRN Moderate Pain (4 - 6)  heparin   Injectable 6500 Unit(s) IV Push every 6 hours PRN For aPTT less than 40  heparin   Injectable 3000 Unit(s) IV Push every 6 hours PRN For aPTT between 40 - 57  traMADol 50 milliGRAM(s) Oral every 6 hours PRN Severe Pain (7 - 10)      REVIEW OF SYSTEMS:                           ALL ROS DONE [ X   ]    CONSTITUTIONAL:  LETHARGIC [   ], FEVER [   ], UNRESPONSIVE [   ]  CVS:  CP  [   ], SOB, [   ], PALPITATIONS [   ], DIZZYNESS [   ]  RS: COUGH [   ], SPUTUM [   ]  GI: ABDOMINAL PAIN [   ], NAUSEA [   ], VOMITINGS [   ], DIARRHEA [   ], CONSTIPATION [   ]  :  DYSURIA [   ], NOCTURIA [   ], INCREASED FREQUENCY [   ], DRIBLING [   ],  SKELETAL: PAINFUL JOINTS [   ], SWOLLEN JOINTS [   ], NECK ACHE [   ], LOW BACK ACHE [   ],  SKIN : ULCERS [   ], RASH [   ], ITCHING [   ]  CNS: HEAD ACHE [   ], DOUBLE VISION [   ], BLURRED VISION [   ], AMS / CONFUSION [   ], SEIZURES [   ], WEAKNESS [   ],TINGLING / NUMBNESS [   ]      PHYSICAL EXAMINATION:    GENERAL APPEARANCE: NO DISTRESS  HEENT:  NO PALLOR, NO  JVD,  NO   NODES, NECK SUPPLE  CVS: S1 +, S2 +,   RS: AEEB,  OCCASIONAL  RALES +,   NO RONCHI  ABD: SOFT, NT, NO, BS +  EXT: PE +, ERYTHEMA WITH INDURATION OF LEFT LEG +  SKIN: WARM,   SKELETAL:  ROM ACCEPTABLE  CNS:  AAO X  1-2  , NO  DEFICITS      RADIOLOGY :    < from: CT Chest No Cont (20 @ 11:02) >  IMPRESSION:     Pulmonaryedema and bibasilar atelectasis    Trace right pleural effusion has developed. Decreased size of loculated left pleural effusion, now small, with pleural pigtail catheter in place. No pneumothorax.    < end of copied text >    < from: Xray Chest 1 View- PORTABLE-Urgent (20 @ 12:50) >  IMPRESSION:   1. S/P removal of left pigtail catheter.  2. No pneumothorax.    < end of copied text >    < from: CT Head No Cont (20 @ 13:20) >  IMPRESSION:    No acute intracranial pathology.    Chronic white matter infarcts in the high right frontoparietal region, right watershed distribution. And left corona radiata. Small chronic left cerebellar infarct.    Mild  microvascular disease.     If symptoms persist, recommend follow-up MRI.    < end of copied text >      ASSESSMENT :     Cellulitis  ESRD (end stage renal disease) on dialysis  Stented coronary artery  Hyperthyroidism  CKD (chronic kidney disease)  H/O: CVA  Heart Attack  Coronary Stent  Hypercholesterolemia  CAD (Coronary Artery Disease)  DM (Diabetes Mellitus)  HTN (Hypertension)  S/P CABG x 4  Hemodialysis access, AV graft  S/P cataract extraction  Boil of Buttock      PLAN:  HPI:  69M with CAD s/p multiple stents, s/p CABG (2019 course c/b Afib and LT pleural effusion), Diastolic CHF, HTN, HLD, DM2, Afib on coumadin and ESRD on HD (, Thurs, Sat at [facility]), hyperthyroidism, presented to ER for pain and swelling of left leg since 3 days. Patient reports sustaining injury to posterior aspect of left leg 1 month ago when he accidentally hit his leg against his bed. He reports the wound has not healed since. Reports he developed pain and swelling of left leg since 3 days, 9/10 in severity, with difficulty ambulating. Reports he was advised to get doppler which he reviewed with [facility] cardiologist today, was told he has no clot and recommended to come to ER. Denies fever, chills, weakness, cough, dysuria, NVDC. (2020 19:29)    - PT/OT RECOMMENDS HOME WITH HOME PT/OT AND HYPOXIA TO 84 % ON ROOM AIR AT REST - WILL NEED HOME O2; CASE MANAGEMENT ARRANGING  - AMS DUE TO  METABOLIC ENCEPHALOPATHY AND VASCULAR DEMENTIA  - AVOID DELIRIOGENIC AGENTS, FREQUENT RE-ORIENTATION. CT HEAD IS DONE ON 2020, NO ACUTE CHANGES , BUT CHRONIC LACUNAR INFARCTS. D/W PATIENT'S SON AND HIS CARDIOLOGIST     -  CELLULITIS OF LEFT LEG, DIABETIC LEFT FOOT ULCER  - IMPROVED ON VANCOMYCIN [PER ID RECOMMENDATION,  BLOOD  CXS NGTD. ID F/UP IS IN PROGRESS  BY DR. DUEÑAS    - LOCULATED LEFT SIDED PLEURAL EFFUSION S/P IR PLACED PIG TAIL CATHETER ON  2020, S/P TPA INFUSION VIA CHEST TUBE 2020 AND REMOVAL 2020 PULMONOLOGY CONSULT & THORACIC SURGERY CONSULT IN PROGRESS. CASE DISCUSSED WITH PATIENT AND SON.   - LEFT ARM SWELLING -  ULTRASOUND NEGATIVE FOR DVT, VASCULAR SX CONSULT IN PLACE - RECOMMENDED OUTPATIENT FISTULOGRAM TO EVALUATION FOR CENTRAL STENOSIS  -  ESRD ON HD - NEPHROLOGY CONSULT IN PROGRESS  - DIASTOLIC CHF - TTE W/ EVIDENCE OF RIGHT/LEFT VENTRICULAR SYSTOLIC DYSFUNCTION, PULMONARY HTN, MR, STRESS TEST-  WITHOUT NEW PERFUSION ABNORMALITIES; CARDIOLOGY CONSULT IN PROGRESS  - A/FIB ON COUMADIN - RESUME COUMADIN & HEPARIN DRIP; GOAL INR 2-3  -  PAD, DIABETIC PERIPHERAL NEUROPATHY - PAIN MGMT CONSULT IN PROGRESS  - CASE DISCUSSED EXTENSIVELY WITH SON [SYLVAIN RENTERIA ] AND PATIENT. DISCUSSED RISKS/BENEFITS OF CHEST TUBE PLACEMENT WITH SON, WHO AGREED TO PROCEED WITH INTERVENTION.  -  PAD, DIABETIC PERIPHERAL NEUROPATHY  -  GI AND DVT PROPHYLAXIS    DONALD DIAZ M.D. COVERING FOR EPHRAIM DIAZ M.D.

## 2020-07-11 NOTE — PROGRESS NOTE ADULT - SUBJECTIVE AND OBJECTIVE BOX
[facility] : Progress Note :: [phone], (office [phone]),   Dr Calle / Dr Esposito / Dr Macias / Dr Mendiola / Dr Narendra JULIEN / Dr Arroyo / Dr Knight / Dr Renan pickens  _____________________________________________________________________________________________  Patient is a 69y Male with    lisinopril (Other)  opioid-like analgesics (Other)    Hospital Medications:   MEDICATIONS  (STANDING):  alteplase  Injectable for Pleural Effusion 2 milliGRAM(s) IntraPleural. once  aMIOdarone    Tablet 200 milliGRAM(s) Oral daily  aspirin enteric coated 81 milliGRAM(s) Oral daily  calcium acetate 667 milliGRAM(s) Oral three times a day with meals  chlorhexidine 2% Cloths 1 Application(s) Topical daily  dextrose 5%. 1000 milliLiter(s) (50 mL/Hr) IV Continuous <Continuous>  dornase danilo Solution for Pleural Effusion 5 milliGRAM(s) IntraPleural. once  dornase danilo Solution for Pleural Effusion 5 milliGRAM(s) IntraPleural. once  heparin  Infusion.  Unit(s)/Hr (15 mL/Hr) IV Continuous <Continuous>  insulin lispro (HumaLOG) corrective regimen sliding scale   SubCutaneous three times a day before meals  insulin lispro (HumaLOG) corrective regimen sliding scale   SubCutaneous at bedtime  ketotifen 0.025% Ophthalmic Solution 1 Drop(s) Both EYES two times a day  methimazole 5 milliGRAM(s) Oral daily  metoprolol tartrate 100 milliGRAM(s) Oral two times a day  pantoprazole    Tablet 40 milliGRAM(s) Oral before breakfast  senna 2 Tablet(s) Oral at bedtime  simvastatin 40 milliGRAM(s) Oral at bedtime  vancomycin  IVPB 1000 milliGRAM(s) IV Intermittent <User Schedule>    REVIEW OF SYSTEMS:  CONSTITUTIONAL: No weakness, fevers or chills  EYES/ENT: No visual changes;  No vertigo or throat pain   NECK: No pain or stiffness  RESPIRATORY: No cough, wheezing, hemoptysis; No shortness of breath  CARDIOVASCULAR: No chest pain or palpitations.  GASTROINTESTINAL: No abdominal or epigastric pain. No nausea, vomiting, or hematemesis; No diarrhea or constipation. No melena or hematochezia.  GENITOURINARY: No dysuria, frequency, foamy urine, urinary urgency, incontinence or hematuria  NEUROLOGICAL: No numbness or weakness  SKIN: No itching, burning, rashes, or lesions   VASCULAR: No bilateral lower extremity edema.   All other review of systems is negative unless indicated above.    VITALS:  T(F): 97.7 (07-11-20 @ 08:29), Max: 98.1 (07-11-20 @ 00:37)  HR: 76 (07-11-20 @ 08:29)  BP: 99/50 (07-11-20 @ 08:29)  RR: 20 (07-11-20 @ 08:29)  SpO2: 99% (07-11-20 @ 08:29)  Wt(kg): --      PHYSICAL EXAM:  Constitutional: NAD  HEENT: anicteric sclera, oropharynx clear, MMM  Neck: No JVD  Respiratory: CTAB, no wheezes, rales or rhonchi  Cardiovascular: S1, S2, RRR  Gastrointestinal: BS+, soft, NT/ND  Extremities: No cyanosis or clubbing. No peripheral edema  Neurological: A/O x 3, no focal deficits  Vascular Access: AVF with swollen arm and 2 ulcers.    LABS:  07-11    134<L>  |  97  |  73<H>  ----------------------------<  134<H>  4.7   |  27  |  8.63<H>    Ca    8.7      11 Jul 2020 06:21  Phos  5.6     07-11    TPro  6.7  /  Alb  2.7<L>  /  TBili  0.5  /  DBili      /  AST  18  /  ALT  21  /  AlkPhos  62  07-11    Creatinine Trend: 8.63 <--, 7.40 <--, 9.51 <--, 7.98 <--, 10.00 <--, 8.66 <--, 7.39 <--                        12.6   8.43  )-----------( 175      ( 11 Jul 2020 06:21 )             43.2     Urine Studies:      RADIOLOGY & ADDITIONAL STUDIES:

## 2020-07-12 LAB
ALBUMIN SERPL ELPH-MCNC: 2.9 G/DL — LOW (ref 3.5–5)
ALP SERPL-CCNC: 64 U/L — SIGNIFICANT CHANGE UP (ref 40–120)
ALT FLD-CCNC: 22 U/L DA — SIGNIFICANT CHANGE UP (ref 10–60)
ANION GAP SERPL CALC-SCNC: 9 MMOL/L — SIGNIFICANT CHANGE UP (ref 5–17)
APTT BLD: 34.8 SEC — SIGNIFICANT CHANGE UP (ref 27.5–35.5)
AST SERPL-CCNC: 19 U/L — SIGNIFICANT CHANGE UP (ref 10–40)
BASOPHILS # BLD AUTO: 0.08 K/UL — SIGNIFICANT CHANGE UP (ref 0–0.2)
BASOPHILS NFR BLD AUTO: 1 % — SIGNIFICANT CHANGE UP (ref 0–2)
BILIRUB SERPL-MCNC: 0.5 MG/DL — SIGNIFICANT CHANGE UP (ref 0.2–1.2)
BUN SERPL-MCNC: 51 MG/DL — HIGH (ref 7–18)
CALCIUM SERPL-MCNC: 8.9 MG/DL — SIGNIFICANT CHANGE UP (ref 8.4–10.5)
CHLORIDE SERPL-SCNC: 95 MMOL/L — LOW (ref 96–108)
CO2 SERPL-SCNC: 28 MMOL/L — SIGNIFICANT CHANGE UP (ref 22–31)
CREAT SERPL-MCNC: 6.98 MG/DL — HIGH (ref 0.5–1.3)
EOSINOPHIL # BLD AUTO: 0.14 K/UL — SIGNIFICANT CHANGE UP (ref 0–0.5)
EOSINOPHIL NFR BLD AUTO: 1.7 % — SIGNIFICANT CHANGE UP (ref 0–6)
GLUCOSE BLDC GLUCOMTR-MCNC: 129 MG/DL — HIGH (ref 70–99)
GLUCOSE BLDC GLUCOMTR-MCNC: 139 MG/DL — HIGH (ref 70–99)
GLUCOSE BLDC GLUCOMTR-MCNC: 151 MG/DL — HIGH (ref 70–99)
GLUCOSE BLDC GLUCOMTR-MCNC: 229 MG/DL — HIGH (ref 70–99)
GLUCOSE SERPL-MCNC: 107 MG/DL — HIGH (ref 70–99)
HCT VFR BLD CALC: 42.8 % — SIGNIFICANT CHANGE UP (ref 39–50)
HGB BLD-MCNC: 12.7 G/DL — LOW (ref 13–17)
IMM GRANULOCYTES NFR BLD AUTO: 2 % — HIGH (ref 0–1.5)
INR BLD: 3.04 RATIO — HIGH (ref 0.88–1.16)
LYMPHOCYTES # BLD AUTO: 0.73 K/UL — LOW (ref 1–3.3)
LYMPHOCYTES # BLD AUTO: 9 % — LOW (ref 13–44)
MCHC RBC-ENTMCNC: 28.3 PG — SIGNIFICANT CHANGE UP (ref 27–34)
MCHC RBC-ENTMCNC: 29.7 GM/DL — LOW (ref 32–36)
MCV RBC AUTO: 95.5 FL — SIGNIFICANT CHANGE UP (ref 80–100)
MONOCYTES # BLD AUTO: 0.92 K/UL — HIGH (ref 0–0.9)
MONOCYTES NFR BLD AUTO: 11.4 % — SIGNIFICANT CHANGE UP (ref 2–14)
NEUTROPHILS # BLD AUTO: 6.06 K/UL — SIGNIFICANT CHANGE UP (ref 1.8–7.4)
NEUTROPHILS NFR BLD AUTO: 74.9 % — SIGNIFICANT CHANGE UP (ref 43–77)
NRBC # BLD: 0 /100 WBCS — SIGNIFICANT CHANGE UP (ref 0–0)
PHOSPHATE SERPL-MCNC: 4.1 MG/DL — SIGNIFICANT CHANGE UP (ref 2.5–4.5)
PLATELET # BLD AUTO: 188 K/UL — SIGNIFICANT CHANGE UP (ref 150–400)
POTASSIUM SERPL-MCNC: 4.1 MMOL/L — SIGNIFICANT CHANGE UP (ref 3.5–5.3)
POTASSIUM SERPL-SCNC: 4.1 MMOL/L — SIGNIFICANT CHANGE UP (ref 3.5–5.3)
PROT SERPL-MCNC: 7.1 G/DL — SIGNIFICANT CHANGE UP (ref 6–8.3)
PROTHROM AB SERPL-ACNC: 33.7 SEC — HIGH (ref 10.6–13.6)
RBC # BLD: 4.48 M/UL — SIGNIFICANT CHANGE UP (ref 4.2–5.8)
RBC # FLD: 15.9 % — HIGH (ref 10.3–14.5)
SODIUM SERPL-SCNC: 132 MMOL/L — LOW (ref 135–145)
WBC # BLD: 8.09 K/UL — SIGNIFICANT CHANGE UP (ref 3.8–10.5)
WBC # FLD AUTO: 8.09 K/UL — SIGNIFICANT CHANGE UP (ref 3.8–10.5)

## 2020-07-12 RX ORDER — WARFARIN SODIUM 2.5 MG/1
4 TABLET ORAL AT BEDTIME
Refills: 0 | Status: DISCONTINUED | OUTPATIENT
Start: 2020-07-12 | End: 2020-07-13

## 2020-07-12 RX ORDER — WARFARIN SODIUM 2.5 MG/1
5 TABLET ORAL ONCE
Refills: 0 | Status: DISCONTINUED | OUTPATIENT
Start: 2020-07-12 | End: 2020-07-12

## 2020-07-12 RX ADMIN — Medication 81 MILLIGRAM(S): at 11:26

## 2020-07-12 RX ADMIN — AMIODARONE HYDROCHLORIDE 200 MILLIGRAM(S): 400 TABLET ORAL at 05:48

## 2020-07-12 RX ADMIN — PANTOPRAZOLE SODIUM 40 MILLIGRAM(S): 20 TABLET, DELAYED RELEASE ORAL at 05:48

## 2020-07-12 RX ADMIN — Medication 1: at 17:16

## 2020-07-12 RX ADMIN — Medication 0: at 21:26

## 2020-07-12 RX ADMIN — KETOTIFEN FUMARATE 1 DROP(S): 0.34 SOLUTION OPHTHALMIC at 05:47

## 2020-07-12 RX ADMIN — Medication 2: at 11:26

## 2020-07-12 RX ADMIN — KETOTIFEN FUMARATE 1 DROP(S): 0.34 SOLUTION OPHTHALMIC at 17:16

## 2020-07-12 RX ADMIN — SENNA PLUS 2 TABLET(S): 8.6 TABLET ORAL at 22:30

## 2020-07-12 RX ADMIN — Medication 100 MILLIGRAM(S): at 17:16

## 2020-07-12 RX ADMIN — SIMVASTATIN 40 MILLIGRAM(S): 20 TABLET, FILM COATED ORAL at 22:30

## 2020-07-12 RX ADMIN — Medication 667 MILLIGRAM(S): at 08:20

## 2020-07-12 RX ADMIN — CHLORHEXIDINE GLUCONATE 1 APPLICATION(S): 213 SOLUTION TOPICAL at 11:27

## 2020-07-12 RX ADMIN — Medication 100 MILLIGRAM(S): at 05:48

## 2020-07-12 RX ADMIN — WARFARIN SODIUM 4 MILLIGRAM(S): 2.5 TABLET ORAL at 22:30

## 2020-07-12 RX ADMIN — Medication 667 MILLIGRAM(S): at 12:00

## 2020-07-12 RX ADMIN — Medication 667 MILLIGRAM(S): at 17:16

## 2020-07-12 NOTE — PROGRESS NOTE ADULT - SUBJECTIVE AND OBJECTIVE BOX
Patient is a 69y old  Male who presents with a chief complaint of leg pain (2020 08:23)    PATIENT IS SEEN AND EXAMINED IN MEDICAL FLOOR.    ALLERGIES:  lisinopril (Other)  opioid-like analgesics (Other)    Daily     Daily Weight in k.5 (2020 17:20)    VITALS:    Vital Signs Last 24 Hrs  T(C): 36.6 (2020 08:12), Max: 37.1 (2020 00:09)  T(F): 97.8 (2020 08:12), Max: 98.7 (2020 00:09)  HR: 75 (2020 08:12) (62 - 84)  BP: 112/65 (2020 08:12) (100/46 - 120/57)  BP(mean): --  RR: 20 (2020 09:07) (17 - 20)  SpO2: 97% (2020 09:07) (87% - 100%)    LABS:    CBC Full  -  ( 2020 07:23 )  WBC Count : 8.09 K/uL  RBC Count : 4.48 M/uL  Hemoglobin : 12.7 g/dL  Hematocrit : 42.8 %  Platelet Count - Automated : 188 K/uL  Mean Cell Volume : 95.5 fl  Mean Cell Hemoglobin : 28.3 pg  Mean Cell Hemoglobin Concentration : 29.7 gm/dL  Auto Neutrophil # : 6.06 K/uL  Auto Lymphocyte # : 0.73 K/uL  Auto Monocyte # : 0.92 K/uL  Auto Eosinophil # : 0.14 K/uL  Auto Basophil # : 0.08 K/uL  Auto Neutrophil % : 74.9 %  Auto Lymphocyte % : 9.0 %  Auto Monocyte % : 11.4 %  Auto Eosinophil % : 1.7 %  Auto Basophil % : 1.0 %    PT/INR - ( 2020 07:23 )   PT: 33.7 sec;   INR: 3.04 ratio         PTT - ( 2020 07:23 )  PTT:34.8 sec  07-12    132<L>  |  95<L>  |  51<H>  ----------------------------<  107<H>  4.1   |  28  |  6.98<H>    Ca    8.9      2020 07:23  Phos  4.1     07-12    TPro  7.1  /  Alb  2.9<L>  /  TBili  0.5  /  DBili  x   /  AST  19  /  ALT  22  /  AlkPhos  64      CAPILLARY BLOOD GLUCOSE      POCT Blood Glucose.: 229 mg/dL (2020 11:17)  POCT Blood Glucose.: 129 mg/dL (2020 07:59)  POCT Blood Glucose.: 142 mg/dL (2020 20:50)  POCT Blood Glucose.: 124 mg/dL (2020 16:05)    LIVER FUNCTIONS - ( 2020 07:23 )  Alb: 2.9 g/dL / Pro: 7.1 g/dL / ALK PHOS: 64 U/L / ALT: 22 U/L DA / AST: 19 U/L / GGT: x           Creatinine Trend: 6.98<--, 8.63<--, 7.40<--, 9.51<--, 7.98<--, 10.00<--  I&O's Summary    2020 07:01  -  2020 07:00  --------------------------------------------------------  IN: 500 mL / OUT: 1300 mL / NET: -800 mL      .Body Fluid pleural left   @ 23:29   No growth at 5 days  --    No polymorphonuclear leukocytes seen  No organisms seen  by cytocentrifuge      .Body Fluid Pleural Fluid   @ 22:58   No growth at 1 week.  --  --      .Blood Blood   @ 22:15   No Growth Final  --  --    MEDICATIONS:    MEDICATIONS  (STANDING):  alteplase  Injectable for Pleural Effusion 2 milliGRAM(s) IntraPleural. once  aMIOdarone    Tablet 200 milliGRAM(s) Oral daily  aspirin enteric coated 81 milliGRAM(s) Oral daily  calcium acetate 667 milliGRAM(s) Oral three times a day with meals  chlorhexidine 2% Cloths 1 Application(s) Topical daily  dextrose 5%. 1000 milliLiter(s) (50 mL/Hr) IV Continuous <Continuous>  dornase danilo Solution for Pleural Effusion 5 milliGRAM(s) IntraPleural. once  dornase danilo Solution for Pleural Effusion 5 milliGRAM(s) IntraPleural. once  insulin lispro (HumaLOG) corrective regimen sliding scale   SubCutaneous three times a day before meals  insulin lispro (HumaLOG) corrective regimen sliding scale   SubCutaneous at bedtime  ketotifen 0.025% Ophthalmic Solution 1 Drop(s) Both EYES two times a day  methimazole 5 milliGRAM(s) Oral daily  metoprolol tartrate 100 milliGRAM(s) Oral two times a day  pantoprazole    Tablet 40 milliGRAM(s) Oral before breakfast  senna 2 Tablet(s) Oral at bedtime  simvastatin 40 milliGRAM(s) Oral at bedtime  vancomycin  IVPB 1000 milliGRAM(s) IV Intermittent <User Schedule>  warfarin 5 milliGRAM(s) Oral once      MEDICATIONS  (PRN):  acetaminophen   Tablet .. 650 milliGRAM(s) Oral every 6 hours PRN Moderate Pain (4 - 6)  traMADol 50 milliGRAM(s) Oral every 6 hours PRN Severe Pain (7 - 10)      REVIEW OF SYSTEMS:                           ALL ROS DONE [ X   ]    CONSTITUTIONAL:  LETHARGIC [   ], FEVER [   ], UNRESPONSIVE [   ]  CVS:  CP  [   ], SOB, [   ], PALPITATIONS [   ], DIZZYNESS [   ]  RS: COUGH [   ], SPUTUM [   ]  GI: ABDOMINAL PAIN [   ], NAUSEA [   ], VOMITINGS [   ], DIARRHEA [   ], CONSTIPATION [   ]  :  DYSURIA [   ], NOCTURIA [   ], INCREASED FREQUENCY [   ], DRIBLING [   ],  SKELETAL: PAINFUL JOINTS [   ], SWOLLEN JOINTS [   ], NECK ACHE [   ], LOW BACK ACHE [   ],  SKIN : ULCERS [   ], RASH [   ], ITCHING [   ]  CNS: HEAD ACHE [   ], DOUBLE VISION [   ], BLURRED VISION [   ], AMS / CONFUSION [   ], SEIZURES [   ], WEAKNESS [   ],TINGLING / NUMBNESS [   ]    PHYSICAL EXAMINATION:    GENERAL APPEARANCE: NO DISTRESS  HEENT:  NO PALLOR, NO  JVD,  NO   NODES, NECK SUPPLE  CVS: S1 +, S2 +,   RS: AEEB,  OCCASIONAL  RALES +,   NO RONCHI  ABD: SOFT, NT, NO, BS +  EXT: PE +, ERYTHEMA WITH INDURATION OF LEFT LEG +  SKIN: WARM,   SKELETAL:  ROM ACCEPTABLE  CNS:  AAO X  1-2  , NO  DEFICITS      RADIOLOGY :    < from: CT Chest No Cont (20 @ 11:02) >  IMPRESSION:     Pulmonaryedema and bibasilar atelectasis    Trace right pleural effusion has developed. Decreased size of loculated left pleural effusion, now small, with pleural pigtail catheter in place. No pneumothorax.    < end of copied text >    < from: Xray Chest 1 View- PORTABLE-Urgent (20 @ 12:50) >  IMPRESSION:   1. S/P removal of left pigtail catheter.  2. No pneumothorax.    < end of copied text >    < from: CT Head No Cont (20 @ 13:20) >  IMPRESSION:    No acute intracranial pathology.    Chronic white matter infarcts in the high right frontoparietal region, right watershed distribution. And left corona radiata. Small chronic left cerebellar infarct.    Mild  microvascular disease.     If symptoms persist, recommend follow-up MRI.    < end of copied text >      ASSESSMENT :     Cellulitis  ESRD (end stage renal disease) on dialysis  Stented coronary artery  Hyperthyroidism  CKD (chronic kidney disease)  H/O: CVA  Heart Attack  Coronary Stent  Hypercholesterolemia  CAD (Coronary Artery Disease)  DM (Diabetes Mellitus)  HTN (Hypertension)  S/P CABG x 4  Hemodialysis access, AV graft  S/P cataract extraction  Boil of Buttock      PLAN:  HPI:  69M with CAD s/p multiple stents, s/p CABG (2019 course c/b Afib and LT pleural effusion), Diastolic CHF, HTN, HLD, DM2, Afib on coumadin and ESRD on HD (, Thurs, Sat at [facility]), hyperthyroidism, presented to ER for pain and swelling of left leg since 3 days. Patient reports sustaining injury to posterior aspect of left leg 1 month ago when he accidentally hit his leg against his bed. He reports the wound has not healed since. Reports he developed pain and swelling of left leg since 3 days, 9/10 in severity, with difficulty ambulating. Reports he was advised to get doppler which he reviewed with [facility] cardiologist today, was told he has no clot and recommended to come to ER. Denies fever, chills, weakness, cough, dysuria, NVDC. (2020 19:29)    - PT/OT RECOMMENDS HOME WITH HOME PT/OT AND HYPOXIA TO 84 % ON ROOM AIR AT REST - WILL NEED HOME O2; CASE MANAGEMENT ARRANGING  - AMS DUE TO  METABOLIC ENCEPHALOPATHY AND VASCULAR DEMENTIA  - AVOID DELIRIOGENIC AGENTS, FREQUENT RE-ORIENTATION. CT HEAD IS DONE ON 2020, NO ACUTE CHANGES , BUT CHRONIC LACUNAR INFARCTS. D/W PATIENT'S SON AND HIS CARDIOLOGIST     -  CELLULITIS OF LEFT LEG, DIABETIC LEFT FOOT ULCER  - IMPROVED ON VANCOMYCIN [PER ID RECOMMENDATION,  BLOOD  CXS NGTD. ID F/UP IS IN PROGRESS  BY DR. DUEÑAS    - LOCULATED LEFT SIDED PLEURAL EFFUSION S/P IR PLACED PIG TAIL CATHETER ON  2020, S/P TPA INFUSION VIA CHEST TUBE 2020 AND REMOVAL 2020 PULMONOLOGY CONSULT & THORACIC SURGERY CONSULT IN PROGRESS. CASE DISCUSSED WITH PATIENT AND SON.   - LEFT ARM SWELLING -  ULTRASOUND NEGATIVE FOR DVT, VASCULAR SX CONSULT IN PLACE - RECOMMENDED OUTPATIENT FISTULOGRAM TO EVALUATION FOR CENTRAL STENOSIS  -  ESRD ON HD - NEPHROLOGY CONSULT IN PROGRESS  - DIASTOLIC CHF - TTE W/ EVIDENCE OF RIGHT/LEFT VENTRICULAR SYSTOLIC DYSFUNCTION, PULMONARY HTN, MR, STRESS TEST-  WITHOUT NEW PERFUSION ABNORMALITIES; CARDIOLOGY CONSULT IN PROGRESS  - A/FIB ON COUMADIN - RESUME COUMADIN & HEPARIN DRIP; GOAL INR 2-3  -  PAD, DIABETIC PERIPHERAL NEUROPATHY - PAIN MGMT CONSULT IN PROGRESS  - CASE DISCUSSED EXTENSIVELY WITH SON [SYLVAIN RENTERIA ] AND PATIENT. DISCUSSED RISKS/BENEFITS OF CHEST TUBE PLACEMENT WITH SON, WHO AGREED TO PROCEED WITH INTERVENTION.  -  PAD, DIABETIC PERIPHERAL NEUROPATHY  -  GI AND DVT PROPHYLAXIS    DONALD DIAZ M.D. COVERING FOR EPHRAIM DIAZ M.D.

## 2020-07-12 NOTE — PROGRESS NOTE ADULT - SUBJECTIVE AND OBJECTIVE BOX
pt seen and examined, no complaints, ROS - .        acetaminophen   Tablet .. 650 milliGRAM(s) Oral every 6 hours PRN  alteplase  Injectable for Pleural Effusion 2 milliGRAM(s) IntraPleural. once  aMIOdarone    Tablet 200 milliGRAM(s) Oral daily  aspirin enteric coated 81 milliGRAM(s) Oral daily  calcium acetate 667 milliGRAM(s) Oral three times a day with meals  chlorhexidine 2% Cloths 1 Application(s) Topical daily  dextrose 5%. 1000 milliLiter(s) IV Continuous <Continuous>  dornase danilo Solution for Pleural Effusion 5 milliGRAM(s) IntraPleural. once  dornase danilo Solution for Pleural Effusion 5 milliGRAM(s) IntraPleural. once  insulin lispro (HumaLOG) corrective regimen sliding scale   SubCutaneous three times a day before meals  insulin lispro (HumaLOG) corrective regimen sliding scale   SubCutaneous at bedtime  ketotifen 0.025% Ophthalmic Solution 1 Drop(s) Both EYES two times a day  methimazole 5 milliGRAM(s) Oral daily  metoprolol tartrate 100 milliGRAM(s) Oral two times a day  pantoprazole    Tablet 40 milliGRAM(s) Oral before breakfast  senna 2 Tablet(s) Oral at bedtime  simvastatin 40 milliGRAM(s) Oral at bedtime  traMADol 50 milliGRAM(s) Oral every 6 hours PRN  vancomycin  IVPB 1000 milliGRAM(s) IV Intermittent <User Schedule>                            12.7   8.09  )-----------( 188      ( 12 Jul 2020 07:23 )             42.8       Hemoglobin: 12.7 g/dL (07-12 @ 07:23)  Hemoglobin: 12.6 g/dL (07-11 @ 06:21)  Hemoglobin: 12.4 g/dL (07-10 @ 06:13)  Hemoglobin: 11.8 g/dL (07-09 @ 10:50)  Hemoglobin: 11.9 g/dL (07-08 @ 21:53)      07-12    132<L>  |  95<L>  |  51<H>  ----------------------------<  107<H>  4.1   |  28  |  6.98<H>    Ca    8.9      12 Jul 2020 07:23  Phos  4.1     07-12    TPro  7.1  /  Alb  2.9<L>  /  TBili  0.5  /  DBili  x   /  AST  19  /  ALT  22  /  AlkPhos  64  07-12    Creatinine Trend: 6.98<--, 8.63<--, 7.40<--, 9.51<--, 7.98<--, 10.00<--    COAGS: PT/INR - ( 12 Jul 2020 07:23 )   PT: 33.7 sec;   INR: 3.04 ratio         PTT - ( 12 Jul 2020 07:23 )  PTT:34.8 sec          T(C): 36.6 (07-12-20 @ 08:12), Max: 37.1 (07-12-20 @ 00:09)  HR: 75 (07-12-20 @ 08:12) (62 - 84)  BP: 112/65 (07-12-20 @ 08:12) (99/50 - 120/57)  RR: 20 (07-12-20 @ 08:12) (17 - 20)  SpO2: 87% (07-12-20 @ 08:12) (87% - 100%)  Wt(kg): --    I&O's Summary    11 Jul 2020 07:01  -  12 Jul 2020 07:00  --------------------------------------------------------  IN: 500 mL / OUT: 1300 mL / NET: -800 mL           Gen: Appears well in NAD  HEENT:  (-)icterus (-)pallor  CV: N S1 S2 1/6 DEJAH (+)2 Pulses B/l  Resp:  Clear to ausculatation B/L, normal effort  GI: (+) BS Soft, NT, ND  Lymph:  (-)lower extremity edema (+) left upper extremity swelling, (-)obvious lymphadenopathy  Skin: Warm to touch, Normal turgor  Psych: Appropriate mood and affect        ASSESSMENT/PLAN: 69y Male  CAD s/p multiple stents, s/p CABG (9/2019 course c/b Afib and LT pleural effusion), Normal LV systolic function, severe Diastolic, RV dysfx CHF, HTN, HLD, DM2, Afib on coumadin and ESRD on HD (Tu, Thurs, Sat at [facility]), hyperthyroidism, presented to ER for pain and swelling of left leg since 3 days.    - LE dopplers negative for dVT    - A/c with heparin gtt and coumadin, goal INR 2-3  - continue with medical management of known CAD with stents with sapt   - Vascular eval noted, oupt f/u with Jessi Reynolds  - oupt f/u with Dr. Méndez for cardiology pt seen and examined, no complaints, ROS - .        acetaminophen   Tablet .. 650 milliGRAM(s) Oral every 6 hours PRN  alteplase  Injectable for Pleural Effusion 2 milliGRAM(s) IntraPleural. once  aMIOdarone    Tablet 200 milliGRAM(s) Oral daily  aspirin enteric coated 81 milliGRAM(s) Oral daily  calcium acetate 667 milliGRAM(s) Oral three times a day with meals  chlorhexidine 2% Cloths 1 Application(s) Topical daily  dextrose 5%. 1000 milliLiter(s) IV Continuous <Continuous>  dornase danilo Solution for Pleural Effusion 5 milliGRAM(s) IntraPleural. once  dornase danilo Solution for Pleural Effusion 5 milliGRAM(s) IntraPleural. once  insulin lispro (HumaLOG) corrective regimen sliding scale   SubCutaneous three times a day before meals  insulin lispro (HumaLOG) corrective regimen sliding scale   SubCutaneous at bedtime  ketotifen 0.025% Ophthalmic Solution 1 Drop(s) Both EYES two times a day  methimazole 5 milliGRAM(s) Oral daily  metoprolol tartrate 100 milliGRAM(s) Oral two times a day  pantoprazole    Tablet 40 milliGRAM(s) Oral before breakfast  senna 2 Tablet(s) Oral at bedtime  simvastatin 40 milliGRAM(s) Oral at bedtime  traMADol 50 milliGRAM(s) Oral every 6 hours PRN  vancomycin  IVPB 1000 milliGRAM(s) IV Intermittent <User Schedule>                            12.7   8.09  )-----------( 188      ( 12 Jul 2020 07:23 )             42.8       Hemoglobin: 12.7 g/dL (07-12 @ 07:23)  Hemoglobin: 12.6 g/dL (07-11 @ 06:21)  Hemoglobin: 12.4 g/dL (07-10 @ 06:13)  Hemoglobin: 11.8 g/dL (07-09 @ 10:50)  Hemoglobin: 11.9 g/dL (07-08 @ 21:53)      07-12    132<L>  |  95<L>  |  51<H>  ----------------------------<  107<H>  4.1   |  28  |  6.98<H>    Ca    8.9      12 Jul 2020 07:23  Phos  4.1     07-12    TPro  7.1  /  Alb  2.9<L>  /  TBili  0.5  /  DBili  x   /  AST  19  /  ALT  22  /  AlkPhos  64  07-12    Creatinine Trend: 6.98<--, 8.63<--, 7.40<--, 9.51<--, 7.98<--, 10.00<--    COAGS: PT/INR - ( 12 Jul 2020 07:23 )   PT: 33.7 sec;   INR: 3.04 ratio         PTT - ( 12 Jul 2020 07:23 )  PTT:34.8 sec          T(C): 36.6 (07-12-20 @ 08:12), Max: 37.1 (07-12-20 @ 00:09)  HR: 75 (07-12-20 @ 08:12) (62 - 84)  BP: 112/65 (07-12-20 @ 08:12) (99/50 - 120/57)  RR: 20 (07-12-20 @ 08:12) (17 - 20)  SpO2: 87% (07-12-20 @ 08:12) (87% - 100%)  Wt(kg): --    I&O's Summary    11 Jul 2020 07:01  -  12 Jul 2020 07:00  --------------------------------------------------------  IN: 500 mL / OUT: 1300 mL / NET: -800 mL           Gen: Appears well in NAD  HEENT:  (-)icterus (-)pallor  CV: N S1 S2 1/6 DEJAH (+)2 Pulses B/l  Resp:  Clear to ausculatation B/L, normal effort  GI: (+) BS Soft, NT, ND  Lymph:  (-)lower extremity edema (+) left upper extremity swelling, (-)obvious lymphadenopathy  Skin: Warm to touch, Normal turgor  Psych: Appropriate mood and affect        ASSESSMENT/PLAN: 69y Male  CAD s/p multiple stents, s/p CABG (9/2019 course c/b Afib and LT pleural effusion), Normal LV systolic function, severe Diastolic, RV dysfx CHF, HTN, HLD, DM2, Afib on coumadin and ESRD on HD (Tu, Thurs, Sat at [facility]), hyperthyroidism, presented to ER for pain and swelling of left leg since 3 days.    - LE dopplers negative for dVT    - A/c with coumadin, goal INR 2-3  - continue with medical management of known CAD with stents with sapt   - Vascular eval noted, oupt f/u with Jessi Reynolds  - oupt f/u with Dr. Méndez for cardiology

## 2020-07-12 NOTE — PROGRESS NOTE ADULT - SUBJECTIVE AND OBJECTIVE BOX
Time of Visit:  Patient seen and examined.     MEDICATIONS  (STANDING):  alteplase  Injectable for Pleural Effusion 2 milliGRAM(s) IntraPleural. once  aMIOdarone    Tablet 200 milliGRAM(s) Oral daily  aspirin enteric coated 81 milliGRAM(s) Oral daily  calcium acetate 667 milliGRAM(s) Oral three times a day with meals  chlorhexidine 2% Cloths 1 Application(s) Topical daily  dextrose 5%. 1000 milliLiter(s) (50 mL/Hr) IV Continuous <Continuous>  dornase danilo Solution for Pleural Effusion 5 milliGRAM(s) IntraPleural. once  dornase danilo Solution for Pleural Effusion 5 milliGRAM(s) IntraPleural. once  insulin lispro (HumaLOG) corrective regimen sliding scale   SubCutaneous three times a day before meals  insulin lispro (HumaLOG) corrective regimen sliding scale   SubCutaneous at bedtime  ketotifen 0.025% Ophthalmic Solution 1 Drop(s) Both EYES two times a day  methimazole 5 milliGRAM(s) Oral daily  metoprolol tartrate 100 milliGRAM(s) Oral two times a day  pantoprazole    Tablet 40 milliGRAM(s) Oral before breakfast  senna 2 Tablet(s) Oral at bedtime  simvastatin 40 milliGRAM(s) Oral at bedtime  vancomycin  IVPB 1000 milliGRAM(s) IV Intermittent <User Schedule>  warfarin 4 milliGRAM(s) Oral at bedtime      MEDICATIONS  (PRN):  acetaminophen   Tablet .. 650 milliGRAM(s) Oral every 6 hours PRN Moderate Pain (4 - 6)  traMADol 50 milliGRAM(s) Oral every 6 hours PRN Severe Pain (7 - 10)       Medications up to date at time of exam.      PHYSICAL EXAMINATION:  Patient has no new complaints.  GENERAL: The patient is a well-developed, well-nourished, in no apparent distress.     Vital Signs Last 24 Hrs  T(C): 36.6 (12 Jul 2020 16:26), Max: 37.1 (12 Jul 2020 00:09)  T(F): 97.9 (12 Jul 2020 16:26), Max: 98.7 (12 Jul 2020 00:09)  HR: 62 (12 Jul 2020 16:26) (62 - 84)  BP: 121/61 (12 Jul 2020 16:26) (100/46 - 121/61)  BP(mean): --  RR: 20 (12 Jul 2020 16:26) (17 - 20)  SpO2: 97% (12 Jul 2020 16:26) (87% - 100%)   (if applicable)    Chest Tube (if applicable)    HEENT: Head is normocephalic and atraumatic. Extraocular muscles are intact. Mucous membranes are moist.     NECK: Supple, no palpable adenopathy.    LUNGS: Clear to auscultation, no wheezing, rales, or rhonchi.    HEART: Regular rate and rhythm without murmur.    ABDOMEN: Soft, nontender, and nondistended.  No hepatosplenomegaly is noted.    : No painful voiding, no pelvic pain    EXTREMITIES: Without any cyanosis, clubbing, rash, lesions or edema.    NEUROLOGIC: Awake, alert, oriented, grossly intact    SKIN: Warm, dry, good turgor.      LABS:                        12.7   8.09  )-----------( 188      ( 12 Jul 2020 07:23 )             42.8     07-12    132<L>  |  95<L>  |  51<H>  ----------------------------<  107<H>  4.1   |  28  |  6.98<H>    Ca    8.9      12 Jul 2020 07:23  Phos  4.1     07-12    TPro  7.1  /  Alb  2.9<L>  /  TBili  0.5  /  DBili  x   /  AST  19  /  ALT  22  /  AlkPhos  64  07-12    PT/INR - ( 12 Jul 2020 07:23 )   PT: 33.7 sec;   INR: 3.04 ratio         PTT - ( 12 Jul 2020 07:23 )  PTT:34.8 sec                    MICROBIOLOGY: (if applicable)    RADIOLOGY & ADDITIONAL STUDIES:  EKG:   CXR:  ECHO:    IMPRESSION: 69y Male PAST MEDICAL & SURGICAL HISTORY:  ESRD (end stage renal disease) on dialysis: T-Th-Sat  Stented coronary artery: total 15 stents from [id number]  Hyperthyroidism  H/O: CVA: after cardiac stent placed 12/15/09 -no residual  Heart Attack: 2/1/07 with subsequent stent  Coronary Stent: [id number] 10 stents,  to Ramus 1/2015, cath 01/2016 ( see results in HPI)  Hypercholesterolemia  CAD (Coronary Artery Disease)  DM (Diabetes Mellitus): x 4 yrs without N/N/R  HTN (Hypertension)  S/P CABG x 4  Hemodialysis access, AV graft: 5/2015, L arm  S/P cataract extraction  Boil of Buttock: 2006 and 2008   p/w       IMP: This is a  69 man  with CAD s/p multiple stents, s/p CABG (9/2019 course c/b Afib and LT pleural effusion), Diastolic CHF, HTN, HLD, DM2, Afib on coumadin and ESRD on HD (Tu, Thurs, Sat at [facility]), hyperthyroidism, presented to ER for pain and swelling of left leg since 3 days. Admitted to medicine for LLE cellulitis.  CXR shows gross heart enlargement and mild to moderate left base pleural pulmonary reaction again noted. Sternotomy again seen. Negative for DVT on Doppler study. Blood Cx NGTD. Pt was started on Unasyn and po Doxy, but changed to Vancomycin due to non healing cellulitis. ID consulted.  Improved leg swelling and erythema with Vanco.   Pt. with ESRD on HD, followed by nephro Dr. Calle, HD tolerated. Pt. with significant cardiac hx including AF on Coumadin, followed by cardiology. Coumadin dose adjusted for goal INR 2-3. ECHO resulted EF 40-45% (previous EF 55% in Jan. 2020.  CT chest reveal enlarging left loculated pleural effusion. Pat is not in any resp distress and O2 sat 100 % 2 LPM via NC. He will require chest tube placement but INR is elevated due to coumadin. Thoracic surgery unable to place chest tube, s/p thoracentesis 6/30 .. 150 ml . Post procedure cxr neg for ptx.  IR placed Left chest tube 7/2. repeated cxr, no improvement .       Sugg;  -chest tube d/c 7/8  -post chest tube removal cxr no PTX  -f/u pleural fluid cytology  -dialysis as per neph  -PT/OT documented hypoxia with activity ( 7/10 note)  -arrangements for O2 supp 3 LPM   -antibx  -vanco level

## 2020-07-13 DIAGNOSIS — Z29.9 ENCOUNTER FOR PROPHYLACTIC MEASURES, UNSPECIFIED: ICD-10-CM

## 2020-07-13 LAB
ALBUMIN SERPL ELPH-MCNC: 2.8 G/DL — LOW (ref 3.5–5)
ALP SERPL-CCNC: 59 U/L — SIGNIFICANT CHANGE UP (ref 40–120)
ALT FLD-CCNC: 22 U/L DA — SIGNIFICANT CHANGE UP (ref 10–60)
ANION GAP SERPL CALC-SCNC: 8 MMOL/L — SIGNIFICANT CHANGE UP (ref 5–17)
APTT BLD: 37.9 SEC — HIGH (ref 27.5–35.5)
AST SERPL-CCNC: 15 U/L — SIGNIFICANT CHANGE UP (ref 10–40)
BASOPHILS # BLD AUTO: 0.06 K/UL — SIGNIFICANT CHANGE UP (ref 0–0.2)
BASOPHILS NFR BLD AUTO: 0.8 % — SIGNIFICANT CHANGE UP (ref 0–2)
BILIRUB SERPL-MCNC: 0.4 MG/DL — SIGNIFICANT CHANGE UP (ref 0.2–1.2)
BUN SERPL-MCNC: 62 MG/DL — HIGH (ref 7–18)
CALCIUM SERPL-MCNC: 9.1 MG/DL — SIGNIFICANT CHANGE UP (ref 8.4–10.5)
CHLORIDE SERPL-SCNC: 93 MMOL/L — LOW (ref 96–108)
CO2 SERPL-SCNC: 29 MMOL/L — SIGNIFICANT CHANGE UP (ref 22–31)
CREAT SERPL-MCNC: 8.27 MG/DL — HIGH (ref 0.5–1.3)
EOSINOPHIL # BLD AUTO: 0.18 K/UL — SIGNIFICANT CHANGE UP (ref 0–0.5)
EOSINOPHIL NFR BLD AUTO: 2.4 % — SIGNIFICANT CHANGE UP (ref 0–6)
GLUCOSE BLDC GLUCOMTR-MCNC: 107 MG/DL — HIGH (ref 70–99)
GLUCOSE BLDC GLUCOMTR-MCNC: 141 MG/DL — HIGH (ref 70–99)
GLUCOSE BLDC GLUCOMTR-MCNC: 146 MG/DL — HIGH (ref 70–99)
GLUCOSE BLDC GLUCOMTR-MCNC: 169 MG/DL — HIGH (ref 70–99)
GLUCOSE SERPL-MCNC: 105 MG/DL — HIGH (ref 70–99)
HCT VFR BLD CALC: 40.4 % — SIGNIFICANT CHANGE UP (ref 39–50)
HGB BLD-MCNC: 12.3 G/DL — LOW (ref 13–17)
IMM GRANULOCYTES NFR BLD AUTO: 2 % — HIGH (ref 0–1.5)
INR BLD: 4 RATIO — HIGH (ref 0.88–1.16)
LYMPHOCYTES # BLD AUTO: 0.79 K/UL — LOW (ref 1–3.3)
LYMPHOCYTES # BLD AUTO: 10.4 % — LOW (ref 13–44)
MCHC RBC-ENTMCNC: 28.5 PG — SIGNIFICANT CHANGE UP (ref 27–34)
MCHC RBC-ENTMCNC: 30.4 GM/DL — LOW (ref 32–36)
MCV RBC AUTO: 93.7 FL — SIGNIFICANT CHANGE UP (ref 80–100)
MONOCYTES # BLD AUTO: 1.02 K/UL — HIGH (ref 0–0.9)
MONOCYTES NFR BLD AUTO: 13.4 % — SIGNIFICANT CHANGE UP (ref 2–14)
NEUTROPHILS # BLD AUTO: 5.42 K/UL — SIGNIFICANT CHANGE UP (ref 1.8–7.4)
NEUTROPHILS NFR BLD AUTO: 71 % — SIGNIFICANT CHANGE UP (ref 43–77)
NRBC # BLD: 0 /100 WBCS — SIGNIFICANT CHANGE UP (ref 0–0)
PHOSPHATE SERPL-MCNC: 6 MG/DL — HIGH (ref 2.5–4.5)
PLATELET # BLD AUTO: 187 K/UL — SIGNIFICANT CHANGE UP (ref 150–400)
POTASSIUM SERPL-MCNC: 4.9 MMOL/L — SIGNIFICANT CHANGE UP (ref 3.5–5.3)
POTASSIUM SERPL-SCNC: 4.9 MMOL/L — SIGNIFICANT CHANGE UP (ref 3.5–5.3)
PROT SERPL-MCNC: 6.6 G/DL — SIGNIFICANT CHANGE UP (ref 6–8.3)
PROTHROM AB SERPL-ACNC: 43.8 SEC — HIGH (ref 10.6–13.6)
RBC # BLD: 4.31 M/UL — SIGNIFICANT CHANGE UP (ref 4.2–5.8)
RBC # FLD: 15.8 % — HIGH (ref 10.3–14.5)
SARS-COV-2 RNA SPEC QL NAA+PROBE: SIGNIFICANT CHANGE UP
SODIUM SERPL-SCNC: 130 MMOL/L — LOW (ref 135–145)
WBC # BLD: 7.62 K/UL — SIGNIFICANT CHANGE UP (ref 3.8–10.5)
WBC # FLD AUTO: 7.62 K/UL — SIGNIFICANT CHANGE UP (ref 3.8–10.5)

## 2020-07-13 RX ADMIN — Medication 667 MILLIGRAM(S): at 07:53

## 2020-07-13 RX ADMIN — SIMVASTATIN 40 MILLIGRAM(S): 20 TABLET, FILM COATED ORAL at 23:46

## 2020-07-13 RX ADMIN — Medication 667 MILLIGRAM(S): at 11:45

## 2020-07-13 RX ADMIN — AMIODARONE HYDROCHLORIDE 200 MILLIGRAM(S): 400 TABLET ORAL at 06:34

## 2020-07-13 RX ADMIN — Medication 81 MILLIGRAM(S): at 11:45

## 2020-07-13 RX ADMIN — PANTOPRAZOLE SODIUM 40 MILLIGRAM(S): 20 TABLET, DELAYED RELEASE ORAL at 06:34

## 2020-07-13 RX ADMIN — Medication 667 MILLIGRAM(S): at 17:34

## 2020-07-13 RX ADMIN — Medication 1: at 17:35

## 2020-07-13 RX ADMIN — CHLORHEXIDINE GLUCONATE 1 APPLICATION(S): 213 SOLUTION TOPICAL at 11:45

## 2020-07-13 RX ADMIN — Medication 100 MILLIGRAM(S): at 06:34

## 2020-07-13 RX ADMIN — KETOTIFEN FUMARATE 1 DROP(S): 0.34 SOLUTION OPHTHALMIC at 17:34

## 2020-07-13 RX ADMIN — SENNA PLUS 2 TABLET(S): 8.6 TABLET ORAL at 23:46

## 2020-07-13 RX ADMIN — KETOTIFEN FUMARATE 1 DROP(S): 0.34 SOLUTION OPHTHALMIC at 06:34

## 2020-07-13 NOTE — PROGRESS NOTE ADULT - ASSESSMENT
Patient is a 69y Male whom presented to the hospital with LT leg pain and swelling.  Was sent from cardiology clinic where he had gone for routine evaluation as was noted  to have RT leg swelling, to R/O DVT. ( doppler US negative for DVT).admitted for treatment of leg cellulitis   Renal consulted for ESRD. Large left loculated pleural effusion s/p L pigtail catheter. and removal    A/P  #ESRD  HD in AM  . GRACIELA thompson. vascular consulted, to get angioplasty as outpatient  # Anemia of CKD. stable off ROSARIO  # CKDMBD cont phoslo.  # Hypertension controlled

## 2020-07-13 NOTE — PROGRESS NOTE ADULT - PROBLEM SELECTOR PROBLEM 3
ESRD (end stage renal disease) on dialysis Chronic combined systolic and diastolic congestive heart failure

## 2020-07-13 NOTE — PROGRESS NOTE ADULT - ATTENDING COMMENTS
HPI:  69M with CAD s/p multiple stents, s/p CABG (9/2019 course c/b Afib and LT pleural effusion), Diastolic CHF, HTN, HLD, DM2, Afib on coumadin and ESRD on HD (Tu, Thurs, Sat at [facility]), hyperthyroidism, presented to ER for pain and swelling of left leg since 3 days. Patient reports sustaining injury to posterior aspect of left leg 1 month ago when he accidentally hit his leg against his bed. He reports the wound has not healed since. Reports he developed pain and swelling of left leg since 3 days, 9/10 in severity, with difficulty ambulating. Reports he was advised to get doppler which he reviewed with [facility] cardiologist today, was told he has no clot and recommended to come to ER. Denies fever, chills, weakness, cough, dysuria, NVDC. (18 Jun 2020 19:29)    - PT/OT RECOMMENDS HOME WITH HOME PT/OT AND HYPOXIA TO 84 % ON ROOM AIR AT REST - WILL NEED HOME O2; CASE MANAGEMENT ARRANGING  - AMS DUE TO  METABOLIC ENCEPHALOPATHY AND VASCULAR DEMENTIA  - AVOID DELIRIOGENIC AGENTS, FREQUENT RE-ORIENTATION. CT HEAD IS DONE ON 07-, NO ACUTE CHANGES , BUT CHRONIC LACUNAR INFARCTS. D/W PATIENT'S SON AND HIS CARDIOLOGIST     -  CELLULITIS OF LEFT LEG, DIABETIC LEFT FOOT ULCER  - IMPROVED ON VANCOMYCIN [PER ID RECOMMENDATION,  BLOOD  CXS NGTD. ID F/UP IS IN PROGRESS  BY DR. DUEÑAS    - LOCULATED LEFT SIDED PLEURAL EFFUSION S/P IR PLACED PIG TAIL CATHETER ON  07-, S/P TPA INFUSION VIA CHEST TUBE 07/07/2020 AND REMOVAL 07/08/2020 PULMONOLOGY CONSULT & THORACIC SURGERY CONSULT IN PROGRESS. CASE DISCUSSED WITH PATIENT AND SON.   - LEFT ARM SWELLING -  ULTRASOUND NEGATIVE FOR DVT, VASCULAR SX CONSULT IN PLACE - RECOMMENDED OUTPATIENT FISTULOGRAM TO EVALUATION FOR CENTRAL STENOSIS  -  ESRD ON HD - NEPHROLOGY CONSULT IN PROGRESS  - DIASTOLIC CHF - TTE W/ EVIDENCE OF RIGHT/LEFT VENTRICULAR SYSTOLIC DYSFUNCTION, PULMONARY HTN, MR, STRESS TEST-  WITHOUT NEW PERFUSION ABNORMALITIES; CARDIOLOGY CONSULT IN PROGRESS  - A/FIB ON COUMADIN, SUPRATHERAPEUTIC INR - HOLD COUMADIN; GOAL INR 2-3  -  PAD, DIABETIC PERIPHERAL NEUROPATHY - PAIN MGMT CONSULT IN PROGRESS  - CASE DISCUSSED EXTENSIVELY WITH SON [SYLVAIN RENTERIA ] AND PATIENT. DISCUSSED RISKS/BENEFITS OF CHEST TUBE PLACEMENT WITH SON, WHO AGREED TO PROCEED WITH INTERVENTION.  -  PAD, DIABETIC PERIPHERAL NEUROPATHY  -  GI AND DVT PROPHYLAXIS    DONALD DIAZ M.D. COVERING FOR EPHRAIM DIAZ M.D.

## 2020-07-13 NOTE — PROGRESS NOTE ADULT - SUBJECTIVE AND OBJECTIVE BOX
69M with CAD s/p multiple stents, s/p CABG (9/2019 course c/b Afib and LT pleural effusion), Diastolic CHF, HTN, HLD, DM2, Afib on coumadin and ESRD on HD (Tu, Thurs, Sat at [facility]), hyperthyroidism, presented to ER for pain and swelling of left leg. Admitted to medicine for LLE cellulitis. CT chest found to have large L loculated pleural effusion,s/p thoracentesis draining 150cc of blood tinged pleural fluid. Thoracic  surgery following, s/p chest tube insertion on 7/2. Repeat chest xray shows improvement of pleural effusion, Heparin drip resumed. nephro Dr. Calle following. CT remains with increased output. hospital course complicated with worsening delirium, CT head done and showed no acute pathology and worsening left arm swelling.   Pt is followed by vascular.  No need for CT to r/o central stenosis. Recommended outpatient follow up with Dr. Goncalves for further evaluation (fistulagram).   Pigtail chest tube removed by Thoracic surgery 7/8. Post chest xray stable without pneumothorax. No further thoracic surgery intervention at this time. Pain management on board.   Resumed AC therapy with heparin drip and coumadin.  Vancomycin resumed as per ID to give during HD for left arm swelling. on HD T/Th/S.    PT reconsulted for functional status. INR 4.0 today, coumadin held.     INTERVAL HPI/OVERNIGHT EVENTS: Seen at bedside, no new complaints    MEDICATIONS  (STANDING):  alteplase  Injectable for Pleural Effusion 2 milliGRAM(s) IntraPleural. once  aMIOdarone    Tablet 200 milliGRAM(s) Oral daily  aspirin enteric coated 81 milliGRAM(s) Oral daily  calcium acetate 667 milliGRAM(s) Oral three times a day with meals  chlorhexidine 2% Cloths 1 Application(s) Topical daily  dextrose 5%. 1000 milliLiter(s) (50 mL/Hr) IV Continuous <Continuous>  dornase danilo Solution for Pleural Effusion 5 milliGRAM(s) IntraPleural. once  dornase danilo Solution for Pleural Effusion 5 milliGRAM(s) IntraPleural. once  insulin lispro (HumaLOG) corrective regimen sliding scale   SubCutaneous three times a day before meals  insulin lispro (HumaLOG) corrective regimen sliding scale   SubCutaneous at bedtime  ketotifen 0.025% Ophthalmic Solution 1 Drop(s) Both EYES two times a day  methimazole 5 milliGRAM(s) Oral daily  metoprolol tartrate 100 milliGRAM(s) Oral two times a day  pantoprazole    Tablet 40 milliGRAM(s) Oral before breakfast  senna 2 Tablet(s) Oral at bedtime  simvastatin 40 milliGRAM(s) Oral at bedtime  vancomycin  IVPB 1000 milliGRAM(s) IV Intermittent <User Schedule>  warfarin 4 milliGRAM(s) Oral at bedtime    MEDICATIONS  (PRN):  acetaminophen   Tablet .. 650 milliGRAM(s) Oral every 6 hours PRN Moderate Pain (4 - 6)  traMADol 50 milliGRAM(s) Oral every 6 hours PRN Severe Pain (7 - 10)      __________________________________________________  REVIEW OF SYSTEMS:    CONSTITUTIONAL: No fever,   RESPIRATORY: No cough; No shortness of breath  CARDIOVASCULAR: No chest pain, no palpitations  GASTROINTESTINAL: No pain. No nausea or vomiting; No diarrhea   NEUROLOGICAL: No headache or numbness, no tremors  MUSCULOSKELETAL: No joint pain, no muscle pain  GENITOURINARY: no dysuria, no frequency, no hesitancy  PSYCHIATRY: no depression , no anxiety  ALL OTHER  ROS negative        Vital Signs Last 24 Hrs  T(C): 36.2 (13 Jul 2020 15:45), Max: 36.8 (12 Jul 2020 23:15)  T(F): 97.1 (13 Jul 2020 15:45), Max: 98.2 (12 Jul 2020 23:15)  HR: 76 (13 Jul 2020 15:45) (60 - 76)  BP: 95/60 (13 Jul 2020 15:45) (95/60 - 120/60)  BP(mean): --  RR: 20 (13 Jul 2020 15:45) (18 - 20)  SpO2: 96% (13 Jul 2020 15:45) (96% - 98%)    ________________________________________________  PHYSICAL EXAM:  GENERAL: NAD  HEENT: Normocephalic;  conjunctivae and sclerae clear; moist mucous membranes;   NECK : supple  CHEST/LUNG: Clear to auscultation bilaterally with good air entry   HEART: S1 S2  regular; no murmurs, gallops or rubs  ABDOMEN: Soft, Nontender, Nondistended; Bowel sounds present  EXTREMITIES: Left hand swelling   SKIN: warm and dry; no rash  NERVOUS SYSTEM:  Awake and alert; Oriented  to place, person and time ; no new deficits    _________________________________________________  LABS:                        12.3   7.62  )-----------( 187      ( 13 Jul 2020 06:42 )             40.4     07-13    130<L>  |  93<L>  |  62<H>  ----------------------------<  105<H>  4.9   |  29  |  8.27<H>    Ca    9.1      13 Jul 2020 06:42  Phos  6.0     07-13    TPro  6.6  /  Alb  2.8<L>  /  TBili  0.4  /  DBili  x   /  AST  15  /  ALT  22  /  AlkPhos  59  07-13    PT/INR - ( 13 Jul 2020 06:42 )   PT: 43.8 sec;   INR: 4.00 ratio         PTT - ( 13 Jul 2020 06:42 )  PTT:37.9 sec    CAPILLARY BLOOD GLUCOSE      POCT Blood Glucose.: 146 mg/dL (13 Jul 2020 11:29)  POCT Blood Glucose.: 107 mg/dL (13 Jul 2020 07:47)  POCT Blood Glucose.: 139 mg/dL (12 Jul 2020 21:09)  POCT Blood Glucose.: 151 mg/dL (12 Jul 2020 16:51)        RADIOLOGY & ADDITIONAL TESTS:    < from: Xray Chest 1 View- PORTABLE-Urgent (07.08.20 @ 12:50) >  EXAM:  XR CHEST PORTABLE URGENT 1V                            PROCEDURE DATE:  07/08/2020      INTERPRETATION:    DATE OF STUDY: 7/8/2020 at 12:41PM    PRIOR: Earlier 7/8/20 exam.    CLINICAL INDICATION: S/P removal of left pigtail catheter.    TECHNIQUE: portable chest.    FINDINGS:   S/P sternotomy.  Left chest pigtail catheter has been removed.  Stable cardiomegaly.  Persistent left pleural effusion with stable lower left lung opacity.  No pneumothorax.    Right lung airspace opacities are stable.    IMPRESSION:   1. S/P removal of left pigtail catheter.  2. No pneumothorax.      < from: Xray Chest 1 View- PORTABLE-Routine (07.08.20 @ 11:20) >    EXAM:  XR CHEST PORTABLE ROUTINE 1V                          PROCEDURE DATE:  07/08/2020      INTERPRETATION:  Follow-up.    AP chest. Prior 7/7/2020.    Left chest pigtail catheter reidentified in position. There is interval decrease in the airspace disease and effusion at the lateral left base. There is definition of left hemidiaphragm at this time. Small left pleural effusion or pleural reaction persists. No pneumothorax or other interval change.    Impression: As above      Culture - Blood (06.18.20 @ 22:15)    Specimen Source: .Blood Blood    Culture Results:   No Growth Final      Imaging  Reviewed:  YES    Consultant(s) Notes Reviewed:   YES      Plan of care was discussed with patient and /or primary care giver; all questions and concerns were addressed

## 2020-07-13 NOTE — PROGRESS NOTE ADULT - SUBJECTIVE AND OBJECTIVE BOX
[facility] : Progress Note :: [phone], (office [phone]),   Dr Calle / Dr Esposito / Dr Macias / Dr Mendiola / Dr Narendra JULIEN / Dr Arroyo / Dr Knight / Dr Renan pickens  _____________________________________________________________________________________________    elevated INR    lisinopril (Other)  opioid-like analgesics (Other)    Hospital Medications:   MEDICATIONS  (STANDING):  alteplase  Injectable for Pleural Effusion 2 milliGRAM(s) IntraPleural. once  aMIOdarone    Tablet 200 milliGRAM(s) Oral daily  aspirin enteric coated 81 milliGRAM(s) Oral daily  calcium acetate 667 milliGRAM(s) Oral three times a day with meals  chlorhexidine 2% Cloths 1 Application(s) Topical daily  dextrose 5%. 1000 milliLiter(s) (50 mL/Hr) IV Continuous <Continuous>  dornase danilo Solution for Pleural Effusion 5 milliGRAM(s) IntraPleural. once  dornase danilo Solution for Pleural Effusion 5 milliGRAM(s) IntraPleural. once  insulin lispro (HumaLOG) corrective regimen sliding scale   SubCutaneous three times a day before meals  insulin lispro (HumaLOG) corrective regimen sliding scale   SubCutaneous at bedtime  ketotifen 0.025% Ophthalmic Solution 1 Drop(s) Both EYES two times a day  methimazole 5 milliGRAM(s) Oral daily  metoprolol tartrate 100 milliGRAM(s) Oral two times a day  pantoprazole    Tablet 40 milliGRAM(s) Oral before breakfast  senna 2 Tablet(s) Oral at bedtime  simvastatin 40 milliGRAM(s) Oral at bedtime  vancomycin  IVPB 1000 milliGRAM(s) IV Intermittent <User Schedule>        VITALS:  T(F): 97.1 (07-13-20 @ 15:45), Max: 98.2 (07-12-20 @ 23:15)  HR: 76 (07-13-20 @ 15:45)  BP: 95/60 (07-13-20 @ 15:45)  RR: 20 (07-13-20 @ 15:45)  SpO2: 96% (07-13-20 @ 15:45)  Wt(kg): --      PHYSICAL EXAM:  Constitutional: NAD  HEENT: anicteric sclera, oropharynx clear  Neck: No JVD  Respiratory: CTAB, no wheezes, rales or rhonchi  Cardiovascular: S1, S2, RRR  Gastrointestinal: BS+, soft, NT/ND  Extremities:  No peripheral edema  Neurological: A/O x 3, no focal deficits  Vascular Access: LUEAVF with thrill and bruit. swollen LUE    LABS:  07-13    130<L>  |  93<L>  |  62<H>  ----------------------------<  105<H>  4.9   |  29  |  8.27<H>    Ca    9.1      13 Jul 2020 06:42  Phos  6.0     07-13    TPro  6.6  /  Alb  2.8<L>  /  TBili  0.4  /  DBili      /  AST  15  /  ALT  22  /  AlkPhos  59  07-13    Creatinine Trend: 8.27 <--, 6.98 <--, 8.63 <--, 7.40 <--, 9.51 <--, 7.98 <--, 10.00 <--                        12.3   7.62  )-----------( 187      ( 13 Jul 2020 06:42 )             40.4     Urine Studies:      RADIOLOGY & ADDITIONAL STUDIES:

## 2020-07-13 NOTE — PROGRESS NOTE ADULT - ATTENDING COMMENTS
Rudy Mendiola MD  [facility]  Office [phone]  Ans Serv [phone] cell - [phone]

## 2020-07-13 NOTE — PROGRESS NOTE ADULT - PROBLEM SELECTOR PLAN 2
Pt with periods of confusion, improving-likely hospital delirium  CT head without change  Avoid agents that may worsen delirum  Supportive care

## 2020-07-13 NOTE — PROGRESS NOTE ADULT - SUBJECTIVE AND OBJECTIVE BOX
Patient denies CP, SOB .os    acetaminophen   Tablet .. 650 milliGRAM(s) Oral every 6 hours PRN  alteplase  Injectable for Pleural Effusion 2 milliGRAM(s) IntraPleural. once  aMIOdarone    Tablet 200 milliGRAM(s) Oral daily  aspirin enteric coated 81 milliGRAM(s) Oral daily  calcium acetate 667 milliGRAM(s) Oral three times a day with meals  chlorhexidine 2% Cloths 1 Application(s) Topical daily  dextrose 5%. 1000 milliLiter(s) IV Continuous <Continuous>  dornase danilo Solution for Pleural Effusion 5 milliGRAM(s) IntraPleural. once  dornase danilo Solution for Pleural Effusion 5 milliGRAM(s) IntraPleural. once  insulin lispro (HumaLOG) corrective regimen sliding scale   SubCutaneous three times a day before meals  insulin lispro (HumaLOG) corrective regimen sliding scale   SubCutaneous at bedtime  ketotifen 0.025% Ophthalmic Solution 1 Drop(s) Both EYES two times a day  methimazole 5 milliGRAM(s) Oral daily  metoprolol tartrate 100 milliGRAM(s) Oral two times a day  pantoprazole    Tablet 40 milliGRAM(s) Oral before breakfast  senna 2 Tablet(s) Oral at bedtime  simvastatin 40 milliGRAM(s) Oral at bedtime  traMADol 50 milliGRAM(s) Oral every 6 hours PRN  vancomycin  IVPB 1000 milliGRAM(s) IV Intermittent <User Schedule>  warfarin 4 milliGRAM(s) Oral at bedtime                            12.3   7.62  )-----------( 187      ( 13 Jul 2020 06:42 )             40.4       Hemoglobin: 12.3 g/dL (07-13 @ 06:42)  Hemoglobin: 12.7 g/dL (07-12 @ 07:23)  Hemoglobin: 12.6 g/dL (07-11 @ 06:21)  Hemoglobin: 12.4 g/dL (07-10 @ 06:13)  Hemoglobin: 11.8 g/dL (07-09 @ 10:50)      07-13    130<L>  |  93<L>  |  62<H>  ----------------------------<  105<H>  4.9   |  29  |  8.27<H>    Ca    9.1      13 Jul 2020 06:42  Phos  6.0     07-13    TPro  6.6  /  Alb  2.8<L>  /  TBili  0.4  /  DBili  x   /  AST  15  /  ALT  22  /  AlkPhos  59  07-13    Creatinine Trend: 8.27<--, 6.98<--, 8.63<--, 7.40<--, 9.51<--, 7.98<--    COAGS: PT/INR - ( 13 Jul 2020 06:42 )   PT: 43.8 sec;   INR: 4.00 ratio         PTT - ( 13 Jul 2020 06:42 )  PTT:37.9 sec          T(C): 36.6 (07-13-20 @ 07:57), Max: 36.8 (07-12-20 @ 23:15)  HR: 70 (07-13-20 @ 07:57) (60 - 70)  BP: 113/54 (07-13-20 @ 07:57) (111/61 - 121/61)  RR: 20 (07-13-20 @ 07:57) (18 - 20)  SpO2: 96% (07-13-20 @ 07:57) (96% - 98%)  Wt(kg): --    I&O's Summary             Gen: Appears well in NAD  HEENT:  (-)icterus (-)pallor  CV: N S1 S2 1/6 DEJAH (+)2 Pulses B/l  Resp:  Clear to ausculatation B/L, normal effort  GI: (+) BS Soft, NT, ND  Lymph:  (-)lower extremity edema (+) left upper extremity swelling, (-)obvious lymphadenopathy  Skin: Warm to touch, Normal turgor  Psych: Appropriate mood and affect        ASSESSMENT/PLAN: 69y Male  CAD s/p multiple stents, s/p CABG (9/2019 course c/b Afib and LT pleural effusion), Normal LV systolic function, severe Diastolic, RV dysfx CHF, HTN, HLD, DM2, Afib on coumadin and ESRD on HD (Tu, Thurs, Sat at [facility]), hyperthyroidism, presented to ER for pain and swelling of left leg since 3 days.    - LE dopplers negative for dVT    - A/c with coumadin, goal INR 2-3  - continue with medical management of known CAD with stents with sapt   - Vascular eval noted, oupt f/u with Jessi Reynolds  - oupt f/u with Dr. Méndez for cardiology  - d/c planning per med    Murphy Colin MD, [facility]  BEEPER [phone]

## 2020-07-13 NOTE — PROGRESS NOTE ADULT - PROBLEM SELECTOR PLAN 1
s/p pigtail insertion by IR om 7/2  s/p TPA on 7/7  Removed CT pig tail 7/8 s/p pigtail insertion by IR om 7/2  s/p TPA on 7/7  Removed CT pig tail 7/8  Cardiology Dr. Cristi Delaney  Thoracic surgery following  post pigtail removal CXR stable w/o pneumothorax  INR 4.0 today, coumadin held   INR in AM

## 2020-07-13 NOTE — PROGRESS NOTE ADULT - PROBLEM SELECTOR PLAN 5
Left arm with edema and tenderness  Afebrile, no leukocytosis  Blood cx no growth to date   LUE and LE dopplers negative for DVT  Vascular following. Recommended f/u with Dr. Goncalves for fistulagram  Continue Vancomycin   ID Dr. Rayo

## 2020-07-13 NOTE — PROGRESS NOTE ADULT - PROBLEM SELECTOR PLAN 10
D/C planning when medically optimized-with home oxygen therapy and PT services  Case management following D/C planning when medically optimized-with home oxygen therapy and PT services  Repeat covid sent 7/13  Case management following

## 2020-07-13 NOTE — CHART NOTE - NSCHARTNOTEFT_GEN_A_CORE
-pt's O2 sat 88% on room air, pt will need home oxygen to reduce episodes of hypoxia and re admissions

## 2020-07-13 NOTE — PROGRESS NOTE ADULT - SUBJECTIVE AND OBJECTIVE BOX
Time of Visit:  Patient seen and examined.     MEDICATIONS  (STANDING):  alteplase  Injectable for Pleural Effusion 2 milliGRAM(s) IntraPleural. once  aMIOdarone    Tablet 200 milliGRAM(s) Oral daily  aspirin enteric coated 81 milliGRAM(s) Oral daily  calcium acetate 667 milliGRAM(s) Oral three times a day with meals  chlorhexidine 2% Cloths 1 Application(s) Topical daily  dextrose 5%. 1000 milliLiter(s) (50 mL/Hr) IV Continuous <Continuous>  dornase danilo Solution for Pleural Effusion 5 milliGRAM(s) IntraPleural. once  dornase danilo Solution for Pleural Effusion 5 milliGRAM(s) IntraPleural. once  insulin lispro (HumaLOG) corrective regimen sliding scale   SubCutaneous three times a day before meals  insulin lispro (HumaLOG) corrective regimen sliding scale   SubCutaneous at bedtime  ketotifen 0.025% Ophthalmic Solution 1 Drop(s) Both EYES two times a day  methimazole 5 milliGRAM(s) Oral daily  metoprolol tartrate 100 milliGRAM(s) Oral two times a day  pantoprazole    Tablet 40 milliGRAM(s) Oral before breakfast  senna 2 Tablet(s) Oral at bedtime  simvastatin 40 milliGRAM(s) Oral at bedtime  vancomycin  IVPB 1000 milliGRAM(s) IV Intermittent <User Schedule>      MEDICATIONS  (PRN):  acetaminophen   Tablet .. 650 milliGRAM(s) Oral every 6 hours PRN Moderate Pain (4 - 6)  traMADol 50 milliGRAM(s) Oral every 6 hours PRN Severe Pain (7 - 10)       Medications up to date at time of exam.      PHYSICAL EXAMINATION:  Patient has no new complaints.  GENERAL: The patient is a well-developed, well-nourished, in no apparent distress.     Vital Signs Last 24 Hrs  T(C): 36.2 (13 Jul 2020 15:45), Max: 36.8 (12 Jul 2020 23:15)  T(F): 97.1 (13 Jul 2020 15:45), Max: 98.2 (12 Jul 2020 23:15)  HR: 76 (13 Jul 2020 15:45) (60 - 76)  BP: 95/60 (13 Jul 2020 15:45) (95/60 - 120/60)  BP(mean): --  RR: 20 (13 Jul 2020 15:45) (18 - 20)  SpO2: 96% (13 Jul 2020 15:45) (96% - 98%)   (if applicable)    Chest Tube (if applicable)    HEENT: Head is normocephalic and atraumatic. Extraocular muscles are intact. Mucous membranes are moist.     NECK: Supple, no palpable adenopathy.    LUNGS: Clear to auscultation, no wheezing, rales, or rhonchi.    HEART: Regular rate and rhythm without murmur.    ABDOMEN: Soft, nontender, and nondistended.  No hepatosplenomegaly is noted.    : No painful voiding, no pelvic pain    EXTREMITIES:  + LUE  edema.    NEUROLOGIC: Awake, alert, oriented, grossly intact    SKIN: Warm, dry, good turgor.      LABS:                        12.3   7.62  )-----------( 187      ( 13 Jul 2020 06:42 )             40.4     07-13    130<L>  |  93<L>  |  62<H>  ----------------------------<  105<H>  4.9   |  29  |  8.27<H>    Ca    9.1      13 Jul 2020 06:42  Phos  6.0     07-13    TPro  6.6  /  Alb  2.8<L>  /  TBili  0.4  /  DBili  x   /  AST  15  /  ALT  22  /  AlkPhos  59  07-13    PT/INR - ( 13 Jul 2020 06:42 )   PT: 43.8 sec;   INR: 4.00 ratio         PTT - ( 13 Jul 2020 06:42 )  PTT:37.9 sec                    MICROBIOLOGY: (if applicable)    RADIOLOGY & ADDITIONAL STUDIES:  EKG:   CXR:  ECHO:    IMPRESSION: 69y Male PAST MEDICAL & SURGICAL HISTORY:  ESRD (end stage renal disease) on dialysis: T-Th-Sat  Stented coronary artery: total 15 stents from 1149-6499  Hyperthyroidism  H/O: CVA: after cardiac stent placed 12/15/09 -no residual  Heart Attack: 2/1/07 with subsequent stent  Coronary Stent: [id number] 10 stents,  to Ramus 1/2015, cath 01/2016 ( see results in HPI)  Hypercholesterolemia  CAD (Coronary Artery Disease)  DM (Diabetes Mellitus): x 4 yrs without N/N/R  HTN (Hypertension)  S/P CABG x 4  Hemodialysis access, AV graft: 5/2015, L arm  S/P cataract extraction  Boil of Buttock: 2006 and 2008   p/w         IMP: This is a  69 man  with CAD s/p multiple stents, s/p CABG (9/2019 course c/b Afib and LT pleural effusion), Diastolic CHF, HTN, HLD, DM2, Afib on coumadin and ESRD on HD (Tu, Thurs, Sat at [facility]), hyperthyroidism, presented to ER for pain and swelling of left leg since 3 days. Admitted to medicine for LLE cellulitis.  CXR shows gross heart enlargement and mild to moderate left base pleural pulmonary reaction again noted. Sternotomy again seen. Negative for DVT on Doppler study. Blood Cx NGTD. Pt was started on Unasyn and po Doxy, but changed to Vancomycin due to non healing cellulitis. ID consulted.  Improved leg swelling and erythema with Vanco.   Pt. with ESRD on HD, followed by nephro Dr. Calle, HD tolerated. Pt. with significant cardiac hx including AF on Coumadin, followed by cardiology. Coumadin dose adjusted for goal INR 2-3. ECHO resulted EF 40-45% (previous EF 55% in Jan. 2020.  CT chest reveal enlarging left loculated pleural effusion. Pat is not in any resp distress and O2 sat 100 % 2 LPM via NC. He will require chest tube placement but INR is elevated due to coumadin. Thoracic surgery unable to place chest tube, s/p thoracentesis 6/30 .. 150 ml . Post procedure cxr neg for ptx.  IR placed Left chest tube 7/2. repeated cxr, no improvement .       Sugg;  -chest tube d/c 7/8  -post chest tube removal cxr no PTX  -f/u pleural fluid cytology  -dialysis as per neph  -PT/OT documented hypoxia with activity ( 7/10 note)  -arrangements for O2 supp 3 LPM   -antibx  -vanco level        d/c with NP negative - no vomiting, no diarrhea

## 2020-07-13 NOTE — PROGRESS NOTE ADULT - PROBLEM SELECTOR PLAN 3
Pt not currently in exacerbation  Echo with HFrEF of 40-45%, moderate global left ventricular systolic dysfunction and decreased RV systolic function with moderate pulmonary hypertension  negative stress test   C/w  ASA, statin and BB   Cardiology Dr. Colin

## 2020-07-14 LAB
APTT BLD: 42.7 SEC — HIGH (ref 27.5–35.5)
GLUCOSE BLDC GLUCOMTR-MCNC: 116 MG/DL — HIGH (ref 70–99)
GLUCOSE BLDC GLUCOMTR-MCNC: 128 MG/DL — HIGH (ref 70–99)
GLUCOSE BLDC GLUCOMTR-MCNC: 147 MG/DL — HIGH (ref 70–99)
GLUCOSE BLDC GLUCOMTR-MCNC: 158 MG/DL — HIGH (ref 70–99)
INR BLD: 3.64 RATIO — HIGH (ref 0.88–1.16)
PROTHROM AB SERPL-ACNC: 40 SEC — HIGH (ref 10.6–13.6)
VANCOMYCIN TROUGH SERPL-MCNC: 17.7 UG/ML — SIGNIFICANT CHANGE UP (ref 10–20)

## 2020-07-14 RX ADMIN — SENNA PLUS 2 TABLET(S): 8.6 TABLET ORAL at 22:39

## 2020-07-14 RX ADMIN — KETOTIFEN FUMARATE 1 DROP(S): 0.34 SOLUTION OPHTHALMIC at 06:43

## 2020-07-14 RX ADMIN — Medication 100 MILLIGRAM(S): at 06:42

## 2020-07-14 RX ADMIN — PANTOPRAZOLE SODIUM 40 MILLIGRAM(S): 20 TABLET, DELAYED RELEASE ORAL at 06:42

## 2020-07-14 RX ADMIN — Medication 667 MILLIGRAM(S): at 11:40

## 2020-07-14 RX ADMIN — SIMVASTATIN 40 MILLIGRAM(S): 20 TABLET, FILM COATED ORAL at 22:39

## 2020-07-14 RX ADMIN — AMIODARONE HYDROCHLORIDE 200 MILLIGRAM(S): 400 TABLET ORAL at 06:42

## 2020-07-14 RX ADMIN — Medication 1: at 12:02

## 2020-07-14 RX ADMIN — Medication 667 MILLIGRAM(S): at 08:39

## 2020-07-14 RX ADMIN — CHLORHEXIDINE GLUCONATE 1 APPLICATION(S): 213 SOLUTION TOPICAL at 11:40

## 2020-07-14 RX ADMIN — Medication 81 MILLIGRAM(S): at 11:40

## 2020-07-14 NOTE — CHART NOTE - NSCHARTNOTEFT_GEN_A_CORE
Assessment:   69yMalePatient is a 69y old  Male who presents with a chief complaint of leg pain (14 Jul 2020 14:50)  Pt visited in dialysis. Pt able to answer to question asked. Pt Reports Good appetite. Po tolerated. Labs Noted  Alb 2.8, Na 130, . Pt w ESRD. Continuos to be on Dialysis. Pigtail D/C d . meds Noted On Phos lo    Factors impacting intake: [ ] none [ ] nausea  [ ] vomiting [ ] diarrhea [ ] constipation  [ ]chewing problems [ ] swallowing issues  [ ] other:     Diet Prescription: Diet, Consistent Carbohydrate w/Evening Snack:   DASH/ TLC  {Sodium & Cholesterol Restricted}  For patients receiving Renal Replacement - No Protein Restr, No Conc K, No Conc Phos, Low Sodium (RENAL)  Supplement Feeding Modality:  Oral  Glucerna Shake Cans or Servings Per Day:  1       Frequency:  Daily (07-01-20 @ 11:42)    Intake:  ~ >50 %     Current Weight:   % Weight Change    Pertinent Medications: MEDICATIONS  (STANDING):  alteplase  Injectable for Pleural Effusion 2 milliGRAM(s) IntraPleural. once  aMIOdarone    Tablet 200 milliGRAM(s) Oral daily  aspirin enteric coated 81 milliGRAM(s) Oral daily  calcium acetate 667 milliGRAM(s) Oral three times a day with meals  chlorhexidine 2% Cloths 1 Application(s) Topical daily  dextrose 5%. 1000 milliLiter(s) (50 mL/Hr) IV Continuous <Continuous>  dornase danilo Solution for Pleural Effusion 5 milliGRAM(s) IntraPleural. once  dornase danilo Solution for Pleural Effusion 5 milliGRAM(s) IntraPleural. once  insulin lispro (HumaLOG) corrective regimen sliding scale   SubCutaneous three times a day before meals  insulin lispro (HumaLOG) corrective regimen sliding scale   SubCutaneous at bedtime  ketotifen 0.025% Ophthalmic Solution 1 Drop(s) Both EYES two times a day  methimazole 5 milliGRAM(s) Oral daily  metoprolol tartrate 100 milliGRAM(s) Oral two times a day  pantoprazole    Tablet 40 milliGRAM(s) Oral before breakfast  senna 2 Tablet(s) Oral at bedtime  simvastatin 40 milliGRAM(s) Oral at bedtime  vancomycin  IVPB 1000 milliGRAM(s) IV Intermittent <User Schedule>    MEDICATIONS  (PRN):  acetaminophen   Tablet .. 650 milliGRAM(s) Oral every 6 hours PRN Moderate Pain (4 - 6)  traMADol 50 milliGRAM(s) Oral every 6 hours PRN Severe Pain (7 - 10)    Pertinent Labs: 07-13 Na130 mmol/L<L> Glu 105 mg/dL<H> K+ 4.9 mmol/L Cr  8.27 mg/dL<H> BUN 62 mg/dL<H> 07-13 Phos 6.0 mg/dL<H> 07-13 Alb 2.8 g/dL<L> 06-19 Chol 94 mg/dL LDL 39 mg/dL HDL 33 mg/dL<L> Trig 110 mg/dL     CAPILLARY BLOOD GLUCOSE      POCT Blood Glucose.: 158 mg/dL (14 Jul 2020 11:58)  POCT Blood Glucose.: 116 mg/dL (14 Jul 2020 07:34)  POCT Blood Glucose.: 141 mg/dL (13 Jul 2020 21:12)  POCT Blood Glucose.: 169 mg/dL (13 Jul 2020 16:54)    Skin:     Estimated Needs:   [ ] no change since previous assessment  [ ] recalculated:     Previous Nutrition Diagnosis:   [ ] Inadequate Energy Intake [ ]Inadequate Oral Intake [ ] Excessive Energy Intake   [ ] Underweight [ ] Increased Nutrient Needs [ ] Overweight/Obesity   [ ] Altered GI Function [ ] Unintended Weight Loss [ ] Food & Nutrition Related Knowledge Deficit [ ] Malnutrition   (x) Altered Nutrition Related labs Continuos.     Nutrition Diagnosis is [x ] ongoing  [ ] resolved [ ] not applicable     New Nutrition Diagnosis: [ ] not applicable       Interventions:   Recommend  [ ] Change Diet To:  [ x] Nutrition Supplement continue with Glucerna shakes Once a day,   [ ] Nutrition Support  [x ] Other: Nephrocaps    Monitoring and Evaluation:   [ ] PO intake [ x ] Tolerance to diet prescription [ x ] weights [ x ] labs[ x ] follow up per protocol  [ ] other:

## 2020-07-14 NOTE — PROGRESS NOTE ADULT - PROBLEM SELECTOR PLAN 1
s/p pigtail insertion by IR om 7/2  s/p TPA on 7/7  Removed CT pig tail 7/8  Cardiology Dr. Cristi Delaney  Thoracic surgery following  post pigtail removal CXR stable w/o pneumothorax  INR 3.6 today, coumadin on hold   INR in AM s/p pigtail insertion by IR on 7/2  s/p TPA on 7/7  Removed CT pig tail 7/8  Cardiology Dr. Cristi Delaney  Thoracic surgery following  post pigtail removal CXR stable w/o pneumothorax  INR 3.6 today, coumadin on hold   INR in AM

## 2020-07-14 NOTE — PROGRESS NOTE ADULT - NSHPATTENDINGPLANDISCUSS_GEN_ALL_CORE
FLOOR STAFF
PATIENT AND FLOOR STAFF
signed out to specials PA
FLOOR STAFF
FLOOR STAFF
D/W PATIENT AND STAFF
FLOOR STAFF
PATIENT AND FLOOR STAFF
PATIENT, SON AND FLOOR STAFF
PATIENT AND FLOOR STAFF
PATIENT, SON AND FLOOR STAFF
PATIENT AND FLOOR STAFF
FLOOR STAFF
PATIENT AND FLOOR STAFF
PATIENT, SON [SYLVAIN] AND FLOOR STAFF

## 2020-07-14 NOTE — PROGRESS NOTE ADULT - SUBJECTIVE AND OBJECTIVE BOX
[facility] : Progress Note :: [phone], (office [phone]),   Dr Calle / Dr Esposito / Dr Macias / Dr Mendiola / Dr Narendra JULIEN / Dr Arroyo / Dr Knight / Dr Renan pickens  _____________________________________________________________________________________________    seen on HD  stable BP    lisinopril (Other)  opioid-like analgesics (Other)    Hospital Medications:   MEDICATIONS  (STANDING):  alteplase  Injectable for Pleural Effusion 2 milliGRAM(s) IntraPleural. once  aMIOdarone    Tablet 200 milliGRAM(s) Oral daily  aspirin enteric coated 81 milliGRAM(s) Oral daily  calcium acetate 667 milliGRAM(s) Oral three times a day with meals  chlorhexidine 2% Cloths 1 Application(s) Topical daily  dextrose 5%. 1000 milliLiter(s) (50 mL/Hr) IV Continuous <Continuous>  dornase danilo Solution for Pleural Effusion 5 milliGRAM(s) IntraPleural. once  dornase danilo Solution for Pleural Effusion 5 milliGRAM(s) IntraPleural. once  insulin lispro (HumaLOG) corrective regimen sliding scale   SubCutaneous three times a day before meals  insulin lispro (HumaLOG) corrective regimen sliding scale   SubCutaneous at bedtime  ketotifen 0.025% Ophthalmic Solution 1 Drop(s) Both EYES two times a day  methimazole 5 milliGRAM(s) Oral daily  metoprolol tartrate 100 milliGRAM(s) Oral two times a day  pantoprazole    Tablet 40 milliGRAM(s) Oral before breakfast  senna 2 Tablet(s) Oral at bedtime  simvastatin 40 milliGRAM(s) Oral at bedtime  vancomycin  IVPB 1000 milliGRAM(s) IV Intermittent <User Schedule>        VITALS:  T(F): 97.9 (07-14-20 @ 07:54), Max: 97.9 (07-14-20 @ 07:54)  HR: 70 (07-14-20 @ 07:54)  BP: 105/61 (07-14-20 @ 07:54)  RR: 20 (07-14-20 @ 07:54)  SpO2: 95% (07-14-20 @ 07:54)  Wt(kg): --      PHYSICAL EXAM:  Constitutional: NAD  HEENT: anicteric sclera, oropharynx clear.  Neck: No JVD  Respiratory: CTAB, no wheezes, rales or rhonchi  Cardiovascular: S1, S2, RRR  Gastrointestinal: BS+, soft, NT/ND  Extremities: No cyanosis or clubbing. No peripheral edema  Neurological: A/O x 3, no focal deficits  Vascular Access: lueavf cannulated. aneurysm swollen LUE. angioplasty as outpatient    LABS:  07-13    130<L>  |  93<L>  |  62<H>  ----------------------------<  105<H>  4.9   |  29  |  8.27<H>    Ca    9.1      13 Jul 2020 06:42  Phos  6.0     07-13    TPro  6.6  /  Alb  2.8<L>  /  TBili  0.4  /  DBili      /  AST  15  /  ALT  22  /  AlkPhos  59  07-13    Creatinine Trend: 8.27 <--, 6.98 <--, 8.63 <--, 7.40 <--, 9.51 <--, 7.98 <--                        12.3   7.62  )-----------( 187      ( 13 Jul 2020 06:42 )             40.4     Urine Studies:      RADIOLOGY & ADDITIONAL STUDIES:

## 2020-07-14 NOTE — PROGRESS NOTE ADULT - PROBLEM SELECTOR PROBLEM 1
Cellulitis
Cellulitis of left lower extremity
Loculated pleural effusion
Acute leg pain, left
Cellulitis of left lower extremity
Loculated pleural effusion
Acute leg pain, left
Loculated pleural effusion
Cellulitis
Loculated pleural effusion
Cellulitis

## 2020-07-14 NOTE — PROGRESS NOTE ADULT - ASSESSMENT
Patient is a 69y Male whom presented to the hospital with LT leg pain and swelling.  Was sent from cardiology clinic where he had gone for routine evaluation as was noted  to have RT leg swelling, to R/O DVT. ( doppler US negative for DVT).admitted for treatment of leg cellulitis   Renal consulted for ESRD. Large left loculated pleural effusion s/p L pigtail catheter. and removal    A/P  #ESRD  seen on HD- stable.  # Anemia of CKD. stable off ROSARIO  # CKDMBD cont phoslo.  # Hypertension controlled

## 2020-07-14 NOTE — PROGRESS NOTE ADULT - SUBJECTIVE AND OBJECTIVE BOX
69y Male    Meds:  vancomycin  IVPB 1000 milliGRAM(s) IV Intermittent <User Schedule>    Allergies    lisinopril (Other)  opioid-like analgesics (Other)    Intolerances        VITALS:  Vital Signs Last 24 Hrs  T(C): 36.3 (14 Jul 2020 18:16), Max: 36.7 (14 Jul 2020 14:10)  T(F): 97.3 (14 Jul 2020 18:16), Max: 98 (14 Jul 2020 14:10)  HR: 84 (14 Jul 2020 18:16) (66 - 89)  BP: 107/50 (14 Jul 2020 18:16) (105/61 - 116/55)  BP(mean): --  RR: 19 (14 Jul 2020 18:16) (19 - 20)  SpO2: 97% (14 Jul 2020 18:16) (95% - 98%)    LABS/DIAGNOSTIC TESTS:                          12.3   7.62  )-----------( 187      ( 13 Jul 2020 06:42 )             40.4         07-13    130<L>  |  93<L>  |  62<H>  ----------------------------<  105<H>  4.9   |  29  |  8.27<H>    Ca    9.1      13 Jul 2020 06:42  Phos  6.0     07-13    TPro  6.6  /  Alb  2.8<L>  /  TBili  0.4  /  DBili  x   /  AST  15  /  ALT  22  /  AlkPhos  59  07-13      LIVER FUNCTIONS - ( 13 Jul 2020 06:42 )  Alb: 2.8 g/dL / Pro: 6.6 g/dL / ALK PHOS: 59 U/L / ALT: 22 U/L DA / AST: 15 U/L / GGT: x             CULTURES: .Body Fluid pleural left  07-02 @ 23:29   No growth at 5 days  --    No polymorphonuclear leukocytes seen  No organisms seen  by cytocentrifuge      .Body Fluid Pleural Fluid  06-30 @ 22:58   No growth at 1 week.  --  --      .Blood Blood  06-18 @ 22:15   No Growth Final  --  --            RADIOLOGY:      ROS:  [  ] UNABLE TO ELICIT 69y Male who is doing much better, he is awake and much more coherent though still a little confused, his left arm swelling has decreased dramatically, he has no fevers or Leukocytosis , no diarrhea or other complaints.    Meds:  vancomycin  IVPB 1000 milliGRAM(s) IV Intermittent <User Schedule>    Allergies    lisinopril (Other)  opioid-like analgesics (Other)    Intolerances        VITALS:  Vital Signs Last 24 Hrs  T(C): 36.3 (14 Jul 2020 18:16), Max: 36.7 (14 Jul 2020 14:10)  T(F): 97.3 (14 Jul 2020 18:16), Max: 98 (14 Jul 2020 14:10)  HR: 84 (14 Jul 2020 18:16) (66 - 89)  BP: 107/50 (14 Jul 2020 18:16) (105/61 - 116/55)  BP(mean): --  RR: 19 (14 Jul 2020 18:16) (19 - 20)  SpO2: 97% (14 Jul 2020 18:16) (95% - 98%)    LABS/DIAGNOSTIC TESTS:                          12.3   7.62  )-----------( 187      ( 13 Jul 2020 06:42 )             40.4         07-13    130<L>  |  93<L>  |  62<H>  ----------------------------<  105<H>  4.9   |  29  |  8.27<H>    Ca    9.1      13 Jul 2020 06:42  Phos  6.0     07-13    TPro  6.6  /  Alb  2.8<L>  /  TBili  0.4  /  DBili  x   /  AST  15  /  ALT  22  /  AlkPhos  59  07-13      LIVER FUNCTIONS - ( 13 Jul 2020 06:42 )  Alb: 2.8 g/dL / Pro: 6.6 g/dL / ALK PHOS: 59 U/L / ALT: 22 U/L DA / AST: 15 U/L / GGT: x             CULTURES: .Body Fluid pleural left  07-02 @ 23:29   No growth at 5 days  --    No polymorphonuclear leukocytes seen  No organisms seen  by cytocentrifuge      .Body Fluid Pleural Fluid  06-30 @ 22:58   No growth at 1 week.  --  --      .Blood Blood  06-18 @ 22:15   No Growth Final  --  --            RADIOLOGY:      ROS:  [  ] UNABLE TO ELICIT

## 2020-07-14 NOTE — PROGRESS NOTE ADULT - PROBLEM SELECTOR PROBLEM 10
Goals of care, counseling/discussion
Discharge planning issues
Goals of care, counseling/discussion
Discharge planning issues
Goals of care, counseling/discussion
Discharge planning issues

## 2020-07-14 NOTE — PROGRESS NOTE ADULT - PROBLEM SELECTOR PLAN 10
D/C planning with home oxygen therapy and PT services likely tomorrow   Repeat covid (-) 7/13  Case management following D/C planning with home oxygen therapy and  Home PT likely tomorrow   Repeat covid (-) 7/13  Case management following

## 2020-07-14 NOTE — PROGRESS NOTE ADULT - ASSESSMENT
Left arm cellulitis - greatly improved, he got a dose of vancomycin today post dialysis    Plan - Agree with DCing home tomorrow.  Give Vancomycin 1 gm iv on Thursday and Saturday at dialysis( only 2 more doses)  reconsult prn.

## 2020-07-14 NOTE — PROGRESS NOTE ADULT - GASTROINTESTINAL DETAILS
nontender/no distention/bowel sounds normal/soft/no guarding/no rigidity
no rigidity/nontender/no guarding/bowel sounds normal/soft/no distention
no rigidity/no guarding/no distention/bowel sounds normal/nontender/soft

## 2020-07-14 NOTE — CHART NOTE - NSCHARTNOTEFT_GEN_A_CORE
O2 sat at rest on room air is 88%. Upon discharge to home, patient requires an oxygen set up for treatment of diastolic CHF. O2 sat at rest on room air is 84%. Upon discharge to home, patient requires an oxygen set up for treatment of diastolic CHF.

## 2020-07-14 NOTE — PROGRESS NOTE ADULT - SUBJECTIVE AND OBJECTIVE BOX
Patient denies CP, SOB Review of systems otherwise (-)      acetaminophen   Tablet .. 650 milliGRAM(s) Oral every 6 hours PRN  alteplase  Injectable for Pleural Effusion 2 milliGRAM(s) IntraPleural. once  aMIOdarone    Tablet 200 milliGRAM(s) Oral daily  aspirin enteric coated 81 milliGRAM(s) Oral daily  calcium acetate 667 milliGRAM(s) Oral three times a day with meals  chlorhexidine 2% Cloths 1 Application(s) Topical daily  dextrose 5%. 1000 milliLiter(s) IV Continuous <Continuous>  dornase danilo Solution for Pleural Effusion 5 milliGRAM(s) IntraPleural. once  dornase danilo Solution for Pleural Effusion 5 milliGRAM(s) IntraPleural. once  insulin lispro (HumaLOG) corrective regimen sliding scale   SubCutaneous three times a day before meals  insulin lispro (HumaLOG) corrective regimen sliding scale   SubCutaneous at bedtime  ketotifen 0.025% Ophthalmic Solution 1 Drop(s) Both EYES two times a day  methimazole 5 milliGRAM(s) Oral daily  metoprolol tartrate 100 milliGRAM(s) Oral two times a day  pantoprazole    Tablet 40 milliGRAM(s) Oral before breakfast  senna 2 Tablet(s) Oral at bedtime  simvastatin 40 milliGRAM(s) Oral at bedtime  traMADol 50 milliGRAM(s) Oral every 6 hours PRN  vancomycin  IVPB 1000 milliGRAM(s) IV Intermittent <User Schedule>                            12.3   7.62  )-----------( 187      ( 13 Jul 2020 06:42 )             40.4       Hemoglobin: 12.3 g/dL (07-13 @ 06:42)  Hemoglobin: 12.7 g/dL (07-12 @ 07:23)  Hemoglobin: 12.6 g/dL (07-11 @ 06:21)  Hemoglobin: 12.4 g/dL (07-10 @ 06:13)  Hemoglobin: 11.8 g/dL (07-09 @ 10:50)      07-13    130<L>  |  93<L>  |  62<H>  ----------------------------<  105<H>  4.9   |  29  |  8.27<H>    Ca    9.1      13 Jul 2020 06:42  Phos  6.0     07-13    TPro  6.6  /  Alb  2.8<L>  /  TBili  0.4  /  DBili  x   /  AST  15  /  ALT  22  /  AlkPhos  59  07-13    Creatinine Trend: 8.27<--, 6.98<--, 8.63<--, 7.40<--, 9.51<--, 7.98<--    COAGS:           T(C): 36.6 (07-14-20 @ 07:54), Max: 36.6 (07-14-20 @ 07:54)  HR: 70 (07-14-20 @ 07:54) (66 - 89)  BP: 105/61 (07-14-20 @ 07:54) (95/60 - 116/55)  RR: 20 (07-14-20 @ 07:54) (19 - 20)  SpO2: 95% (07-14-20 @ 07:54) (95% - 98%)  Wt(kg): --    I&O's Summary      Gen: Appears well in NAD  HEENT:  (-)icterus (-)pallor  CV: N S1 S2 1/6 DEJAH (+)2 Pulses B/l  Resp:  Clear to ausculatation B/L, normal effort  GI: (+) BS Soft, NT, ND  Lymph:  (-)lower extremity edema (+) left upper extremity swelling, (-)obvious lymphadenopathy  Skin: Warm to touch, Normal turgor  Psych: Appropriate mood and affect        ASSESSMENT/PLAN: 69y Male  CAD s/p multiple stents, s/p CABG (9/2019 course c/b Afib and LT pleural effusion), Normal LV systolic function, severe Diastolic, RV dysfx CHF, HTN, HLD, DM2, Afib on coumadin and ESRD on HD (Tu, Thurs, Sat at [facility]), hyperthyroidism, presented to ER for pain and swelling of left leg since 3 days.    - LE dopplers negative for dVT    - HD per renal  - A/c with coumadin, goal INR 2-3  - continue with medical management of known CAD with stents with sapt   - Vascular eval noted, oupt f/u with Jessi Reynolds  - oupt f/u with Dr. Méndez for cardiology  - d/c planning per med    Murphy Colin MD, [facility]  BEEPER [phone]

## 2020-07-14 NOTE — PROGRESS NOTE ADULT - SKIN COMMENTS
left arm swelling dramatically less, erythema and warmth are much less also, he has no tenderness of arm either
left arm swelling 3+, erythema and warmth of left arm compared to his right arm(appears like cellulitis to me)
decreased swelling , warmth and discoloration of left leg and foot

## 2020-07-14 NOTE — PROGRESS NOTE ADULT - RS GEN PE MLT RESP DETAILS PC
good air movement/clear to auscultation bilaterally/no wheezes/no rhonchi/no rales
breath sounds equal/clear to auscultation bilaterally/no wheezes/good air movement/no rales/no rhonchi
no rhonchi/no wheezes/clear to auscultation bilaterally/good air movement/no rales

## 2020-07-14 NOTE — PROGRESS NOTE ADULT - SUBJECTIVE AND OBJECTIVE BOX
69M with CAD s/p multiple stents, s/p CABG (9/2019 course c/b Afib and LT pleural effusion), Diastolic CHF, HTN, HLD, DM2, Afib on coumadin and ESRD on HD (Tu, Thurs, Sat at [facility]), hyperthyroidism, presented to ER for pain and swelling of left leg. Admitted to medicine for LLE cellulitis. CT chest found to have large L loculated pleural effusion,s/p thoracentesis draining 150cc of blood tinged pleural fluid. Thoracic  surgery following, s/p chest tube insertion on 7/2. Repeat chest xray shows improvement of pleural effusion, Heparin drip resumed. nephro Dr. Calle following. CT remains with increased output. hospital course complicated with worsening delirium, CT head done and showed no acute pathology and worsening left arm swelling.   Pt is followed by vascular.  No need for CT to r/o central stenosis. Recommended outpatient follow up with Dr. Goncalves for further evaluation (fistulagram).   Pigtail chest tube removed by Thoracic surgery 7/8. Post chest xray stable without pneumothorax. No further thoracic surgery intervention at this time. Pain management on board.   Resumed AC therapy with heparin drip and coumadin.  Vancomycin resumed as per ID to give during HD for left arm swelling. on HD T/Th/S.    PT reconsulted for functional status. INR 3.6 today, no coumadin.     INTERVAL HPI/OVERNIGHT EVENTS: Seen at bedside, no new complaints    MEDICATIONS  (STANDING):  alteplase  Injectable for Pleural Effusion 2 milliGRAM(s) IntraPleural. once  aMIOdarone    Tablet 200 milliGRAM(s) Oral daily  aspirin enteric coated 81 milliGRAM(s) Oral daily  calcium acetate 667 milliGRAM(s) Oral three times a day with meals  chlorhexidine 2% Cloths 1 Application(s) Topical daily  dextrose 5%. 1000 milliLiter(s) (50 mL/Hr) IV Continuous <Continuous>  dornase danilo Solution for Pleural Effusion 5 milliGRAM(s) IntraPleural. once  dornase danilo Solution for Pleural Effusion 5 milliGRAM(s) IntraPleural. once  insulin lispro (HumaLOG) corrective regimen sliding scale   SubCutaneous three times a day before meals  insulin lispro (HumaLOG) corrective regimen sliding scale   SubCutaneous at bedtime  ketotifen 0.025% Ophthalmic Solution 1 Drop(s) Both EYES two times a day  methimazole 5 milliGRAM(s) Oral daily  metoprolol tartrate 100 milliGRAM(s) Oral two times a day  pantoprazole    Tablet 40 milliGRAM(s) Oral before breakfast  senna 2 Tablet(s) Oral at bedtime  simvastatin 40 milliGRAM(s) Oral at bedtime  vancomycin  IVPB 1000 milliGRAM(s) IV Intermittent <User Schedule>  warfarin 4 milliGRAM(s) Oral at bedtime    MEDICATIONS  (PRN):  acetaminophen   Tablet .. 650 milliGRAM(s) Oral every 6 hours PRN Moderate Pain (4 - 6)  traMADol 50 milliGRAM(s) Oral every 6 hours PRN Severe Pain (7 - 10)      __________________________________________________  REVIEW OF SYSTEMS:    CONSTITUTIONAL: No fever,   RESPIRATORY: No cough; No shortness of breath  CARDIOVASCULAR: No chest pain, no palpitations  GASTROINTESTINAL: No pain. No nausea or vomiting; No diarrhea   NEUROLOGICAL: No headache or numbness, no tremors  MUSCULOSKELETAL: No joint pain, no muscle pain  GENITOURINARY: no dysuria, no frequency, no hesitancy  PSYCHIATRY: no depression , no anxiety  ALL OTHER  ROS negative        Vital Signs Last 24 Hrs  T(C): 36.2 (13 Jul 2020 15:45), Max: 36.8 (12 Jul 2020 23:15)  T(F): 97.1 (13 Jul 2020 15:45), Max: 98.2 (12 Jul 2020 23:15)  HR: 76 (13 Jul 2020 15:45) (60 - 76)  BP: 95/60 (13 Jul 2020 15:45) (95/60 - 120/60)  BP(mean): --  RR: 20 (13 Jul 2020 15:45) (18 - 20)  SpO2: 96% (13 Jul 2020 15:45) (96% - 98%)    ________________________________________________  PHYSICAL EXAM:  GENERAL: NAD  HEENT: Normocephalic;  conjunctivae and sclerae clear; moist mucous membranes;   NECK : supple  CHEST/LUNG: Clear to auscultation bilaterally with good air entry   HEART: S1 S2  regular; no murmurs, gallops or rubs  ABDOMEN: Soft, Nontender, Nondistended; Bowel sounds present  EXTREMITIES: Left hand swelling   SKIN: warm and dry; no rash  NERVOUS SYSTEM:  Awake and alert; Oriented  to place, person and time ; no new deficits    _________________________________________________  LABS:                        12.3   7.62  )-----------( 187      ( 13 Jul 2020 06:42 )             40.4     07-13    130<L>  |  93<L>  |  62<H>  ----------------------------<  105<H>  4.9   |  29  |  8.27<H>    Ca    9.1      13 Jul 2020 06:42  Phos  6.0     07-13    TPro  6.6  /  Alb  2.8<L>  /  TBili  0.4  /  DBili  x   /  AST  15  /  ALT  22  /  AlkPhos  59  07-13    PT/INR - ( 13 Jul 2020 06:42 )   PT: 43.8 sec;   INR: 4.00 ratio         PTT - ( 13 Jul 2020 06:42 )  PTT:37.9 sec    CAPILLARY BLOOD GLUCOSE      POCT Blood Glucose.: 146 mg/dL (13 Jul 2020 11:29)  POCT Blood Glucose.: 107 mg/dL (13 Jul 2020 07:47)  POCT Blood Glucose.: 139 mg/dL (12 Jul 2020 21:09)  POCT Blood Glucose.: 151 mg/dL (12 Jul 2020 16:51)        RADIOLOGY & ADDITIONAL TESTS:    < from: Xray Chest 1 View- PORTABLE-Urgent (07.08.20 @ 12:50) >  EXAM:  XR CHEST PORTABLE URGENT 1V                            PROCEDURE DATE:  07/08/2020      INTERPRETATION:    DATE OF STUDY: 7/8/2020 at 12:41PM    PRIOR: Earlier 7/8/20 exam.    CLINICAL INDICATION: S/P removal of left pigtail catheter.    TECHNIQUE: portable chest.    FINDINGS:   S/P sternotomy.  Left chest pigtail catheter has been removed.  Stable cardiomegaly.  Persistent left pleural effusion with stable lower left lung opacity.  No pneumothorax.    Right lung airspace opacities are stable.    IMPRESSION:   1. S/P removal of left pigtail catheter.  2. No pneumothorax.      < from: Xray Chest 1 View- PORTABLE-Routine (07.08.20 @ 11:20) >    EXAM:  XR CHEST PORTABLE ROUTINE 1V                          PROCEDURE DATE:  07/08/2020      INTERPRETATION:  Follow-up.    AP chest. Prior 7/7/2020.    Left chest pigtail catheter reidentified in position. There is interval decrease in the airspace disease and effusion at the lateral left base. There is definition of left hemidiaphragm at this time. Small left pleural effusion or pleural reaction persists. No pneumothorax or other interval change.    Impression: As above      Culture - Blood (06.18.20 @ 22:15)    Specimen Source: .Blood Blood    Culture Results:   No Growth Final      Imaging  Reviewed:  YES    Consultant(s) Notes Reviewed:   YES      Plan of care was discussed with patient and /or primary care giver; all questions and concerns were addressed

## 2020-07-14 NOTE — PROGRESS NOTE ADULT - ATTENDING COMMENTS
HPI:  69M with CAD s/p multiple stents, s/p CABG (9/2019 course c/b Afib and LT pleural effusion), Diastolic CHF, HTN, HLD, DM2, Afib on coumadin and ESRD on HD (Tu, Thurs, Sat at [facility]), hyperthyroidism, presented to ER for pain and swelling of left leg since 3 days. Patient reports sustaining injury to posterior aspect of left leg 1 month ago when he accidentally hit his leg against his bed. He reports the wound has not healed since. Reports he developed pain and swelling of left leg since 3 days, 9/10 in severity, with difficulty ambulating. Reports he was advised to get doppler which he reviewed with [facility] cardiologist today, was told he has no clot and recommended to come to ER. Denies fever, chills, weakness, cough, dysuria, NVDC. (18 Jun 2020 19:29)    - PT/OT RECOMMENDS HOME WITH HOME PT/OT AND HYPOXIA TO 84 % ON ROOM AIR AT REST - WILL NEED HOME O2; CASE MANAGEMENT ARRANGING  - AMS DUE TO  METABOLIC ENCEPHALOPATHY AND VASCULAR DEMENTIA  - AVOID DELIRIOGENIC AGENTS, FREQUENT RE-ORIENTATION. CT HEAD IS DONE ON 07-, NO ACUTE CHANGES , BUT CHRONIC LACUNAR INFARCTS. D/W PATIENT'S SON AND HIS CARDIOLOGIST     -  CELLULITIS OF LEFT LEG, DIABETIC LEFT FOOT ULCER  - IMPROVED ON VANCOMYCIN [PER ID RECOMMENDATION,  BLOOD  CXS NGTD. ID F/UP IS IN PROGRESS  BY DR. DUEÑAS    - LOCULATED LEFT SIDED PLEURAL EFFUSION S/P IR PLACED PIG TAIL CATHETER ON  07-, S/P TPA INFUSION VIA CHEST TUBE 07/07/2020 AND REMOVAL 07/08/2020 PULMONOLOGY CONSULT & THORACIC SURGERY CONSULT IN PROGRESS. CASE DISCUSSED WITH PATIENT AND SON.   - LEFT ARM SWELLING -  ULTRASOUND NEGATIVE FOR DVT, VASCULAR SX CONSULT IN PLACE - RECOMMENDED OUTPATIENT FISTULOGRAM TO EVALUATION FOR CENTRAL STENOSIS  -  ESRD ON HD - NEPHROLOGY CONSULT IN PROGRESS  - DIASTOLIC CHF - TTE W/ EVIDENCE OF RIGHT/LEFT VENTRICULAR SYSTOLIC DYSFUNCTION, PULMONARY HTN, MR, STRESS TEST-  WITHOUT NEW PERFUSION ABNORMALITIES; CARDIOLOGY CONSULT IN PROGRESS  - A/FIB ON COUMADIN, SUPRATHERAPEUTIC INR - HOLD COUMADIN; GOAL INR 2-3  -  PAD, DIABETIC PERIPHERAL NEUROPATHY - PAIN MGMT CONSULT IN PROGRESS  - CASE DISCUSSED EXTENSIVELY WITH SON [SYLVAIN RENTERIA ] AND PATIENT. DISCUSSED RISKS/BENEFITS OF CHEST TUBE PLACEMENT WITH SON, WHO AGREED TO PROCEED WITH INTERVENTION.  -  PAD, DIABETIC PERIPHERAL NEUROPATHY  -  GI AND DVT PROPHYLAXIS    DONALD DIAZ M.D. COVERING FOR EPHRAIM DIAZ M.D.

## 2020-07-14 NOTE — PROGRESS NOTE ADULT - PROBLEM SELECTOR PLAN 5
Left arm with edema and tenderness  Afebrile, no leukocytosis  Blood cx no growth to date   LUE and LE dopplers negative for DVT  Vascular following. Recommended f/u with Dr. Goncalves for fistulagram  Continue Vancomycin   ID Dr. Rayo Left arm with edema and tenderness  Afebrile, no leukocytosis  Blood cx no growth to date   LUE and LE dopplers negative for DVT  Vascular following. Recommended f/u with Dr. Goncalves for fistulagram  Continue Vancomycin   Vanco trough to be drawn today  ID Dr. Rayo

## 2020-07-15 ENCOUNTER — TRANSCRIPTION ENCOUNTER (OUTPATIENT)
Age: 70
End: 2020-07-15

## 2020-07-15 VITALS — RESPIRATION RATE: 20 BRPM | HEART RATE: 77 BPM | OXYGEN SATURATION: 97 %

## 2020-07-15 LAB
ANION GAP SERPL CALC-SCNC: 9 MMOL/L — SIGNIFICANT CHANGE UP (ref 5–17)
APTT BLD: 37.1 SEC — HIGH (ref 27.5–35.5)
BUN SERPL-MCNC: 48 MG/DL — HIGH (ref 7–18)
CALCIUM SERPL-MCNC: 8.1 MG/DL — LOW (ref 8.4–10.5)
CHLORIDE SERPL-SCNC: 95 MMOL/L — LOW (ref 96–108)
CO2 SERPL-SCNC: 28 MMOL/L — SIGNIFICANT CHANGE UP (ref 22–31)
CREAT SERPL-MCNC: 6.92 MG/DL — HIGH (ref 0.5–1.3)
GLUCOSE BLDC GLUCOMTR-MCNC: 113 MG/DL — HIGH (ref 70–99)
GLUCOSE BLDC GLUCOMTR-MCNC: 151 MG/DL — HIGH (ref 70–99)
GLUCOSE SERPL-MCNC: 107 MG/DL — HIGH (ref 70–99)
HCT VFR BLD CALC: 37 % — LOW (ref 39–50)
HGB BLD-MCNC: 11.4 G/DL — LOW (ref 13–17)
INR BLD: 2.73 RATIO — HIGH (ref 0.88–1.16)
MCHC RBC-ENTMCNC: 28.7 PG — SIGNIFICANT CHANGE UP (ref 27–34)
MCHC RBC-ENTMCNC: 30.8 GM/DL — LOW (ref 32–36)
MCV RBC AUTO: 93.2 FL — SIGNIFICANT CHANGE UP (ref 80–100)
NRBC # BLD: 0 /100 WBCS — SIGNIFICANT CHANGE UP (ref 0–0)
PLATELET # BLD AUTO: 172 K/UL — SIGNIFICANT CHANGE UP (ref 150–400)
POTASSIUM SERPL-MCNC: 4.3 MMOL/L — SIGNIFICANT CHANGE UP (ref 3.5–5.3)
POTASSIUM SERPL-SCNC: 4.3 MMOL/L — SIGNIFICANT CHANGE UP (ref 3.5–5.3)
PROTHROM AB SERPL-ACNC: 30.5 SEC — HIGH (ref 10.6–13.6)
RBC # BLD: 3.97 M/UL — LOW (ref 4.2–5.8)
RBC # FLD: 15.6 % — HIGH (ref 10.3–14.5)
SODIUM SERPL-SCNC: 132 MMOL/L — LOW (ref 135–145)
WBC # BLD: 7.14 K/UL — SIGNIFICANT CHANGE UP (ref 3.8–10.5)
WBC # FLD AUTO: 7.14 K/UL — SIGNIFICANT CHANGE UP (ref 3.8–10.5)

## 2020-07-15 PROCEDURE — 84100 ASSAY OF PHOSPHORUS: CPT

## 2020-07-15 PROCEDURE — 36415 COLL VENOUS BLD VENIPUNCTURE: CPT

## 2020-07-15 PROCEDURE — 93970 EXTREMITY STUDY: CPT

## 2020-07-15 PROCEDURE — 93306 TTE W/DOPPLER COMPLETE: CPT

## 2020-07-15 PROCEDURE — 93005 ELECTROCARDIOGRAM TRACING: CPT

## 2020-07-15 PROCEDURE — 88112 CYTOPATH CELL ENHANCE TECH: CPT

## 2020-07-15 PROCEDURE — 99285 EMERGENCY DEPT VISIT HI MDM: CPT | Mod: 25

## 2020-07-15 PROCEDURE — C1729: CPT

## 2020-07-15 PROCEDURE — 83880 ASSAY OF NATRIURETIC PEPTIDE: CPT

## 2020-07-15 PROCEDURE — 82009 KETONE BODYS QUAL: CPT

## 2020-07-15 PROCEDURE — 96374 THER/PROPH/DIAG INJ IV PUSH: CPT

## 2020-07-15 PROCEDURE — 87205 SMEAR GRAM STAIN: CPT

## 2020-07-15 PROCEDURE — 78452 HT MUSCLE IMAGE SPECT MULT: CPT

## 2020-07-15 PROCEDURE — 87640 STAPH A DNA AMP PROBE: CPT

## 2020-07-15 PROCEDURE — 93971 EXTREMITY STUDY: CPT

## 2020-07-15 PROCEDURE — 80061 LIPID PANEL: CPT

## 2020-07-15 PROCEDURE — 93017 CV STRESS TEST TRACING ONLY: CPT

## 2020-07-15 PROCEDURE — 83735 ASSAY OF MAGNESIUM: CPT

## 2020-07-15 PROCEDURE — A9502: CPT

## 2020-07-15 PROCEDURE — P9059: CPT

## 2020-07-15 PROCEDURE — 87070 CULTURE OTHR SPECIMN AEROBIC: CPT

## 2020-07-15 PROCEDURE — 71250 CT THORAX DX C-: CPT

## 2020-07-15 PROCEDURE — 82746 ASSAY OF FOLIC ACID SERUM: CPT

## 2020-07-15 PROCEDURE — 87206 SMEAR FLUORESCENT/ACID STAI: CPT

## 2020-07-15 PROCEDURE — 82607 VITAMIN B-12: CPT

## 2020-07-15 PROCEDURE — 86900 BLOOD TYPING SEROLOGIC ABO: CPT

## 2020-07-15 PROCEDURE — 71045 X-RAY EXAM CHEST 1 VIEW: CPT

## 2020-07-15 PROCEDURE — 87340 HEPATITIS B SURFACE AG IA: CPT

## 2020-07-15 PROCEDURE — 82962 GLUCOSE BLOOD TEST: CPT

## 2020-07-15 PROCEDURE — 86901 BLOOD TYPING SEROLOGIC RH(D): CPT

## 2020-07-15 PROCEDURE — 94640 AIRWAY INHALATION TREATMENT: CPT

## 2020-07-15 PROCEDURE — 87075 CULTR BACTERIA EXCEPT BLOOD: CPT

## 2020-07-15 PROCEDURE — C1769: CPT

## 2020-07-15 PROCEDURE — 88305 TISSUE EXAM BY PATHOLOGIST: CPT

## 2020-07-15 PROCEDURE — 32557 INSERT CATH PLEURA W/ IMAGE: CPT

## 2020-07-15 PROCEDURE — 86706 HEP B SURFACE ANTIBODY: CPT

## 2020-07-15 PROCEDURE — 82306 VITAMIN D 25 HYDROXY: CPT

## 2020-07-15 PROCEDURE — 86923 COMPATIBILITY TEST ELECTRIC: CPT

## 2020-07-15 PROCEDURE — 89051 BODY FLUID CELL COUNT: CPT

## 2020-07-15 PROCEDURE — 36430 TRANSFUSION BLD/BLD COMPNT: CPT

## 2020-07-15 PROCEDURE — 85730 THROMBOPLASTIN TIME PARTIAL: CPT

## 2020-07-15 PROCEDURE — 87116 MYCOBACTERIA CULTURE: CPT

## 2020-07-15 PROCEDURE — 84443 ASSAY THYROID STIM HORMONE: CPT

## 2020-07-15 PROCEDURE — 99261: CPT

## 2020-07-15 PROCEDURE — 80048 BASIC METABOLIC PNL TOTAL CA: CPT

## 2020-07-15 PROCEDURE — 87641 MR-STAPH DNA AMP PROBE: CPT

## 2020-07-15 PROCEDURE — 80053 COMPREHEN METABOLIC PANEL: CPT

## 2020-07-15 PROCEDURE — 70450 CT HEAD/BRAIN W/O DYE: CPT

## 2020-07-15 PROCEDURE — 87015 SPECIMEN INFECT AGNT CONCNTJ: CPT

## 2020-07-15 PROCEDURE — 83036 HEMOGLOBIN GLYCOSYLATED A1C: CPT

## 2020-07-15 PROCEDURE — 73590 X-RAY EXAM OF LOWER LEG: CPT

## 2020-07-15 PROCEDURE — 80202 ASSAY OF VANCOMYCIN: CPT

## 2020-07-15 PROCEDURE — 87040 BLOOD CULTURE FOR BACTERIA: CPT

## 2020-07-15 PROCEDURE — 85027 COMPLETE CBC AUTOMATED: CPT

## 2020-07-15 PROCEDURE — 85610 PROTHROMBIN TIME: CPT

## 2020-07-15 PROCEDURE — U0003: CPT

## 2020-07-15 PROCEDURE — 86850 RBC ANTIBODY SCREEN: CPT

## 2020-07-15 PROCEDURE — 76942 ECHO GUIDE FOR BIOPSY: CPT

## 2020-07-15 PROCEDURE — 83605 ASSAY OF LACTIC ACID: CPT

## 2020-07-15 RX ORDER — VANCOMYCIN HCL 1 G
1 VIAL (EA) INTRAVENOUS
Qty: 5 | Refills: 0
Start: 2020-07-15 | End: 2020-07-24

## 2020-07-15 RX ORDER — WARFARIN SODIUM 2.5 MG/1
1 TABLET ORAL
Qty: 7 | Refills: 0
Start: 2020-07-15 | End: 2020-07-21

## 2020-07-15 RX ORDER — VANCOMYCIN HCL 1 G
1 VIAL (EA) INTRAVENOUS
Qty: 2 | Refills: 0
Start: 2020-07-15 | End: 2020-07-18

## 2020-07-15 RX ORDER — ACETAMINOPHEN 500 MG
2 TABLET ORAL
Qty: 120 | Refills: 0
Start: 2020-07-15 | End: 2020-07-29

## 2020-07-15 RX ORDER — TRAMADOL HYDROCHLORIDE 50 MG/1
1 TABLET ORAL
Qty: 20 | Refills: 0
Start: 2020-07-15 | End: 2020-07-19

## 2020-07-15 RX ADMIN — CHLORHEXIDINE GLUCONATE 1 APPLICATION(S): 213 SOLUTION TOPICAL at 12:12

## 2020-07-15 RX ADMIN — Medication 667 MILLIGRAM(S): at 08:16

## 2020-07-15 RX ADMIN — Medication 100 MILLIGRAM(S): at 05:38

## 2020-07-15 RX ADMIN — KETOTIFEN FUMARATE 1 DROP(S): 0.34 SOLUTION OPHTHALMIC at 05:39

## 2020-07-15 RX ADMIN — PANTOPRAZOLE SODIUM 40 MILLIGRAM(S): 20 TABLET, DELAYED RELEASE ORAL at 07:31

## 2020-07-15 RX ADMIN — AMIODARONE HYDROCHLORIDE 200 MILLIGRAM(S): 400 TABLET ORAL at 05:38

## 2020-07-15 RX ADMIN — Medication 1: at 12:11

## 2020-07-15 RX ADMIN — Medication 81 MILLIGRAM(S): at 12:11

## 2020-07-15 RX ADMIN — Medication 667 MILLIGRAM(S): at 12:11

## 2020-07-15 NOTE — PROGRESS NOTE ADULT - PROVIDER SPECIALTY LIST ADULT
Cardiology
Infectious Disease
Internal Medicine
Intervent Radiology
Nephrology
Neurology
Pain Medicine
Pulmonology
Thoracic Surgery
Cardiology
Internal Medicine
Thoracic Surgery
Cardiology
Internal Medicine
Intervent Radiology
Intervent Radiology
Pulmonology
Thoracic Surgery
Thoracic Surgery
Internal Medicine
Internal Medicine
Nephrology
Nephrology
Pulmonology
Thoracic Surgery
Nephrology
Nephrology
Thoracic Surgery
Pain Medicine
Internal Medicine

## 2020-07-15 NOTE — DISCHARGE NOTE NURSING/CASE MANAGEMENT/SOCIAL WORK - NSDCFUADDAPPT_GEN_ALL_CORE_FT
[facility] [place] (Tu-Th-Sat); next outpatient hemodialysis session on Thursday 7/16/20

## 2020-07-15 NOTE — PROGRESS NOTE ADULT - REASON FOR ADMISSION
leg pain

## 2020-07-15 NOTE — DISCHARGE NOTE NURSING/CASE MANAGEMENT/SOCIAL WORK - NSDCPEPTCAREGIVEDUMATLIST _GEN_ALL_CORE
Heart Failure/Warfarin/Coumadin/Diabetes Coronavirus/COVID19/Heart Failure/Warfarin/Coumadin/Diabetes

## 2020-07-15 NOTE — PROGRESS NOTE ADULT - SUBJECTIVE AND OBJECTIVE BOX
Patient denies CP, SOB Review of systems otherwise (-)      acetaminophen   Tablet .. 650 milliGRAM(s) Oral every 6 hours PRN  alteplase  Injectable for Pleural Effusion 2 milliGRAM(s) IntraPleural. once  aMIOdarone    Tablet 200 milliGRAM(s) Oral daily  aspirin enteric coated 81 milliGRAM(s) Oral daily  calcium acetate 667 milliGRAM(s) Oral three times a day with meals  chlorhexidine 2% Cloths 1 Application(s) Topical daily  dextrose 5%. 1000 milliLiter(s) IV Continuous <Continuous>  dornase danilo Solution for Pleural Effusion 5 milliGRAM(s) IntraPleural. once  dornase danilo Solution for Pleural Effusion 5 milliGRAM(s) IntraPleural. once  insulin lispro (HumaLOG) corrective regimen sliding scale   SubCutaneous three times a day before meals  insulin lispro (HumaLOG) corrective regimen sliding scale   SubCutaneous at bedtime  ketotifen 0.025% Ophthalmic Solution 1 Drop(s) Both EYES two times a day  methimazole 5 milliGRAM(s) Oral daily  metoprolol tartrate 100 milliGRAM(s) Oral two times a day  pantoprazole    Tablet 40 milliGRAM(s) Oral before breakfast  senna 2 Tablet(s) Oral at bedtime  simvastatin 40 milliGRAM(s) Oral at bedtime  traMADol 50 milliGRAM(s) Oral every 6 hours PRN  vancomycin  IVPB 1000 milliGRAM(s) IV Intermittent <User Schedule>                            11.4   7.14  )-----------( 172      ( 15 Jul 2020 06:26 )             37.0       Hemoglobin: 11.4 g/dL (07-15 @ 06:26)  Hemoglobin: 12.3 g/dL (07-13 @ 06:42)  Hemoglobin: 12.7 g/dL (07-12 @ 07:23)  Hemoglobin: 12.6 g/dL (07-11 @ 06:21)      07-15    132<L>  |  95<L>  |  48<H>  ----------------------------<  107<H>  4.3   |  28  |  6.92<H>    Ca    8.1<L>      15 Jul 2020 06:26      Creatinine Trend: 6.92<--, 8.27<--, 6.98<--, 8.63<--, 7.40<--, 9.51<--    COAGS: PT/INR - ( 15 Jul 2020 06:26 )   PT: 30.5 sec;   INR: 2.73 ratio         PTT - ( 15 Jul 2020 06:26 )  PTT:37.1 sec          T(C): 36.3 (07-15-20 @ 08:35), Max: 36.7 (07-14-20 @ 14:10)  HR: 77 (07-15-20 @ 08:35) (72 - 96)  BP: 106/62 (07-15-20 @ 08:35) (104/55 - 108/57)  RR: 20 (07-15-20 @ 08:35) (18 - 20)  SpO2: 97% (07-15-20 @ 08:35) (97% - 99%)  Wt(kg): --    I&O's Summary    14 Jul 2020 07:01  -  15 Jul 2020 07:00  --------------------------------------------------------  IN: 0 mL / OUT: 0 mL / NET: 0 mL        Gen: Appears well in NAD  HEENT:  (-)icterus (-)pallor  CV: N S1 S2 1/6 DEJAH (+)2 Pulses B/l  Resp:  Clear to ausculatation B/L, normal effort  GI: (+) BS Soft, NT, ND  Lymph:  (-)lower extremity edema (+) left upper extremity swelling, (-)obvious lymphadenopathy  Skin: Warm to touch, Normal turgor  Psych: Appropriate mood and affect        ASSESSMENT/PLAN: 69y Male  CAD s/p multiple stents, s/p CABG (9/2019 course c/b Afib and LT pleural effusion), Normal LV systolic function, severe Diastolic, RV dysfx CHF, HTN, HLD, DM2, Afib on coumadin and ESRD on HD (Tu, Thurs, Sat at [facility]), hyperthyroidism, presented to ER for pain and swelling of left leg since 3 days.    - LE dopplers negative for dVT    - HD per renal  - A/c with coumadin, goal INR 2-3  - continue with medical management of known CAD with stents with sapt   - Vascular eval noted, oupt f/u with Jessi Reynolds  - oupt f/u with Dr. Méndez for cardiology  - d/c planning per reynold Colin MD, [facility]  BEEPER [phone]

## 2020-07-15 NOTE — PROGRESS NOTE ADULT - SUBJECTIVE AND OBJECTIVE BOX
Time of Visit:  Patient seen and examined.     MEDICATIONS  (STANDING):  alteplase  Injectable for Pleural Effusion 2 milliGRAM(s) IntraPleural. once  aMIOdarone    Tablet 200 milliGRAM(s) Oral daily  aspirin enteric coated 81 milliGRAM(s) Oral daily  calcium acetate 667 milliGRAM(s) Oral three times a day with meals  chlorhexidine 2% Cloths 1 Application(s) Topical daily  dextrose 5%. 1000 milliLiter(s) (50 mL/Hr) IV Continuous <Continuous>  dornase danilo Solution for Pleural Effusion 5 milliGRAM(s) IntraPleural. once  dornase danilo Solution for Pleural Effusion 5 milliGRAM(s) IntraPleural. once  insulin lispro (HumaLOG) corrective regimen sliding scale   SubCutaneous three times a day before meals  insulin lispro (HumaLOG) corrective regimen sliding scale   SubCutaneous at bedtime  ketotifen 0.025% Ophthalmic Solution 1 Drop(s) Both EYES two times a day  methimazole 5 milliGRAM(s) Oral daily  metoprolol tartrate 100 milliGRAM(s) Oral two times a day  pantoprazole    Tablet 40 milliGRAM(s) Oral before breakfast  senna 2 Tablet(s) Oral at bedtime  simvastatin 40 milliGRAM(s) Oral at bedtime  vancomycin  IVPB 1000 milliGRAM(s) IV Intermittent <User Schedule>      MEDICATIONS  (PRN):  acetaminophen   Tablet .. 650 milliGRAM(s) Oral every 6 hours PRN Moderate Pain (4 - 6)  traMADol 50 milliGRAM(s) Oral every 6 hours PRN Severe Pain (7 - 10)       Medications up to date at time of exam.      PHYSICAL EXAMINATION:  Patient has no new complaints.  GENERAL: The patient is a well-developed, well-nourished, in no apparent distress.     Vital Signs Last 24 Hrs  T(C): 36.3 (15 Jul 2020 08:35), Max: 36.6 (14 Jul 2020 17:11)  T(F): 97.4 (15 Jul 2020 08:35), Max: 97.9 (14 Jul 2020 17:11)  HR: 77 (15 Jul 2020 12:50) (72 - 96)  BP: 106/62 (15 Jul 2020 08:35) (104/55 - 108/57)  BP(mean): --  RR: 20 (15 Jul 2020 12:50) (18 - 20)  SpO2: 97% (15 Jul 2020 12:50) (97% - 99%)   (if applicable)    Chest Tube (if applicable)    HEENT: Head is normocephalic and atraumatic. Extraocular muscles are intact. Mucous membranes are moist.     NECK: Supple, no palpable adenopathy.    LUNGS: Clear to auscultation, no wheezing, rales, or rhonchi.    HEART: Regular rate and rhythm without murmur.    ABDOMEN: Soft, nontender, and nondistended.  No hepatosplenomegaly is noted.    : No painful voiding, no pelvic pain    EXTREMITIES: Without any cyanosis, clubbing, rash, lesions or edema.    NEUROLOGIC: Awake, alert, oriented, grossly intact    SKIN: Warm, dry, good turgor.      LABS:                        11.4   7.14  )-----------( 172      ( 15 Jul 2020 06:26 )             37.0     07-15    132<L>  |  95<L>  |  48<H>  ----------------------------<  107<H>  4.3   |  28  |  6.92<H>    Ca    8.1<L>      15 Jul 2020 06:26      PT/INR - ( 15 Jul 2020 06:26 )   PT: 30.5 sec;   INR: 2.73 ratio         PTT - ( 15 Jul 2020 06:26 )  PTT:37.1 sec                    MICROBIOLOGY: (if applicable)    RADIOLOGY & ADDITIONAL STUDIES:  EKG:   CXR:  ECHO:    IMPRESSION: 69y Male PAST MEDICAL & SURGICAL HISTORY:  ESRD (end stage renal disease) on dialysis: T-Th-Sat  Stented coronary artery: total 15 stents from 7033-1327  Hyperthyroidism  H/O: CVA: after cardiac stent placed 12/15/09 -no residual  Heart Attack: 2/1/07 with subsequent stent  Coronary Stent: 6280-5553 10 stents,  to Ramus 1/2015, cath 01/2016 ( see results in HPI)  Hypercholesterolemia  CAD (Coronary Artery Disease)  DM (Diabetes Mellitus): x 4 yrs without N/N/R  HTN (Hypertension)  S/P CABG x 4  Hemodialysis access, AV graft: 5/2015, L arm  S/P cataract extraction  Boil of Buttock: 2006 and 2008   p/w           IMP: This is a  69 man  with CAD s/p multiple stents, s/p CABG (9/2019 course c/b Afib and LT pleural effusion), Diastolic CHF, HTN, HLD, DM2, Afib on coumadin and ESRD on HD (Tu, Thurs, Sat at [facility]), hyperthyroidism, presented to ER for pain and swelling of left leg since 3 days. Admitted to medicine for LLE cellulitis.  CXR shows gross heart enlargement and mild to moderate left base pleural pulmonary reaction again noted. Sternotomy again seen. Negative for DVT on Doppler study. Blood Cx NGTD. Pt was started on Unasyn and po Doxy, but changed to Vancomycin due to non healing cellulitis. ID consulted.  Improved leg swelling and erythema with Vanco.   Pt. with ESRD on HD, followed by nephro Dr. Calle, HD tolerated. Pt. with significant cardiac hx including AF on Coumadin, followed by cardiology. Coumadin dose adjusted for goal INR 2-3. ECHO resulted EF 40-45% (previous EF 55% in Jan. 2020.  CT chest reveal enlarging left loculated pleural effusion. Pat is not in any resp distress and O2 sat 100 % 2 LPM via NC. He will require chest tube placement but INR is elevated due to coumadin. Thoracic surgery unable to place chest tube, s/p thoracentesis 6/30 .. 150 ml . Post procedure cxr neg for ptx.  IR placed Left chest tube 7/2. repeated cxr, no improvement .       Sugg;  -chest tube d/c 7/8  -post chest tube removal cxr no PTX  -f/u pleural fluid cytology  -dialysis as per neph  -PT/OT documented hypoxia with activity ( 7/10 note)  -arrangements for O2 supp 3 LPM   -antibx  -vanco level        d/c with NP

## 2020-07-15 NOTE — DISCHARGE NOTE NURSING/CASE MANAGEMENT/SOCIAL WORK - PATIENT PORTAL LINK FT
You can access the FollowMyHealth Patient Portal offered by [facility] by registering at the following website: [email or web address]. By joining [employer]’s FollowMyHealth portal, you will also be able to view your health information using other applications (apps) compatible with our system.

## 2020-07-20 PROBLEM — T82.898A INADEQUATE FLOW OF DIALYSIS ARTERIOVENOUS FISTULA: Status: ACTIVE | Noted: 2019-04-02

## 2020-07-20 NOTE — HISTORY OF PRESENT ILLNESS
[FreeTextEntry1] : alert and oriented x 3 \par accompanied by wife Yasemin [phone]\par  no reported falls \par  INR \par  BS [FreeTextEntry4] : yesterday [FreeTextEntry5] : today 8:30 am [FreeTextEntry6] : Dr MYA laureano

## 2020-07-20 NOTE — PROCEDURE
[FreeTextEntry1] : Left arm fistula/fistulogram /Angioplasty  [Resume diet] : resume diet [Site check for bleeding/hematoma/thrill/bruit] : Site check for bleeding/hematoma/thrill/bruit [Vital signs on admission the q 15 mins x2] : Vital signs on admission the q 15 mins x2

## 2020-07-21 ENCOUNTER — APPOINTMENT (OUTPATIENT)
Dept: ENDOVASCULAR SURGERY | Facility: CLINIC | Age: 70
End: 2020-07-21
Payer: MEDICARE

## 2020-07-21 ENCOUNTER — APPOINTMENT (OUTPATIENT)
Dept: VASCULAR SURGERY | Facility: CLINIC | Age: 70
End: 2020-07-21
Payer: MEDICARE

## 2020-07-21 VITALS — TEMPERATURE: 97.1 F

## 2020-07-21 VITALS
WEIGHT: 179 LBS | DIASTOLIC BLOOD PRESSURE: 52 MMHG | HEIGHT: 66 IN | HEART RATE: 89 BPM | SYSTOLIC BLOOD PRESSURE: 90 MMHG | BODY MASS INDEX: 28.77 KG/M2

## 2020-07-21 DIAGNOSIS — T82.898A OTHER SPECIFIED COMPLICATION OF VASCULAR PROSTHETIC DEVICES, IMPLANTS AND GRAFTS, INITIAL ENCOUNTER: ICD-10-CM

## 2020-07-21 DIAGNOSIS — Z99.2 END STAGE RENAL DISEASE: ICD-10-CM

## 2020-07-21 DIAGNOSIS — N18.6 END STAGE RENAL DISEASE: ICD-10-CM

## 2020-07-21 PROCEDURE — 93990 DOPPLER FLOW TESTING: CPT

## 2020-07-21 PROCEDURE — 99213 OFFICE O/P EST LOW 20 MIN: CPT

## 2020-07-21 NOTE — ASSESSMENT
[FreeTextEntry1] : Patient with renal failure on hemodialysis with severe stenosis of fistula on duplex. \par enlarging aneurysms.  recommend angioplasty

## 2020-07-21 NOTE — REASON FOR VISIT
[Follow-Up Visit] : a follow-up visit for [FreeTextEntry2] : Stenosis of AV fistula  [Aneurysm] : aneurysm

## 2020-07-22 ENCOUNTER — APPOINTMENT (OUTPATIENT)
Dept: ENDOVASCULAR SURGERY | Facility: CLINIC | Age: 70
End: 2020-07-22
Payer: MEDICARE

## 2020-07-22 VITALS — TEMPERATURE: 98.1 F | RESPIRATION RATE: 20 BRPM | HEART RATE: 77 BPM | OXYGEN SATURATION: 99 %

## 2020-07-22 VITALS — DIASTOLIC BLOOD PRESSURE: 69 MMHG | SYSTOLIC BLOOD PRESSURE: 112 MMHG

## 2020-07-22 DIAGNOSIS — Z86.79 PERSONAL HISTORY OF OTHER DISEASES OF THE CIRCULATORY SYSTEM: ICD-10-CM

## 2020-07-22 DIAGNOSIS — I50.30 UNSPECIFIED DIASTOLIC (CONGESTIVE) HEART FAILURE: ICD-10-CM

## 2020-07-22 PROCEDURE — 36903Z: CUSTOM

## 2020-07-22 PROCEDURE — 36907Z: CUSTOM | Mod: 59

## 2020-07-23 NOTE — PAST MEDICAL HISTORY
[Altered mobility] : Altered mobility [Congestive Heart Failure] : Congestive Heart Failure [Obesity: BMI >25] : Obesity: BMI >25 [Major surgery] : Major surgery [FreeTextEntry1] : Malignant Hyperthermis (MH) Screening Tool and Risk of Bleeding Assessement\par Mr. VIOLETTA RENTERIA  denies family history of unexpected death following Anesthesia or Exercise.\par Denies Family history of Malignant Hyperthermia, Muscle or Neuromuscular disorder and High Temperature following exercise.\par \par Mr. VIOLETTA RENTERIA denies history of Muscle Spasm, Dark or Chocolate - Colored urine and Unanticipated fever immediately following anesthesia or serious exercise. \par Mr. RENTERIA  also denies bleeding tendencies/ Risks of Bleeding\par

## 2020-07-23 NOTE — HISTORY OF PRESENT ILLNESS
[] : left brachiocephalic fistula [FreeTextEntry1] : creation 5-22-15 Dr Cao [FreeTextEntry4] : yesterday [FreeTextEntry5] : today 8:30 am [FreeTextEntry6] : Dr Mccartney

## 2020-08-19 LAB
CULTURE RESULTS: SIGNIFICANT CHANGE UP
SPECIMEN SOURCE: SIGNIFICANT CHANGE UP

## 2020-08-24 ENCOUNTER — APPOINTMENT (OUTPATIENT)
Dept: VASCULAR SURGERY | Facility: CLINIC | Age: 70
End: 2020-08-24
Payer: MEDICARE

## 2020-08-24 VITALS — TEMPERATURE: 96.8 F

## 2020-08-24 PROCEDURE — 93880 EXTRACRANIAL BILAT STUDY: CPT

## 2020-08-24 PROCEDURE — 99214 OFFICE O/P EST MOD 30 MIN: CPT

## 2020-08-24 PROCEDURE — 93923 UPR/LXTR ART STDY 3+ LVLS: CPT

## 2020-08-24 RX ORDER — VANCOMYCIN HYDROCHLORIDE 50 MG/ML
50 KIT ORAL
Refills: 0 | Status: ACTIVE | COMMUNITY

## 2020-08-24 NOTE — HISTORY OF PRESENT ILLNESS
[FreeTextEntry1] : Patient with renal failure on hemodialysis status post central venous angioplasty with excellent results.\par \par Patient with left calf ulceration for the past few weeks which is nonhealing associated with significant pain especially in the left foot especially at night.  Patient is on home oxygen after recent hospitalization.  Patient has significant coronary artery disease status post CABG recently.\par \par No history of CVA or TIA with significant carotid disease.

## 2020-08-24 NOTE — CONSULT LETTER
[FreeTextEntry3] : Lexx Goncalves M.D., F.SHALAS., R.P.SWAPNIL.I.\par  of Vascular Surgery\par Assistant Professor of Radiology\par Director of Endovascular Program/ Vascular Access Center\par [facility]

## 2020-08-24 NOTE — ASSESSMENT
[FreeTextEntry1] : Patient with moderate severe peripheral vascular disease of left lower extremity associated with rest pain and nonhealing wound.  Plan for left leg angiogram.\par \par Patient with moderate severe carotid disease which is asymptomatic.  Continue antiplatelet therapy.  Follow-up in 6 months.\par \par Patient with renal failure on hemodialysis, status post central venous angioplasty with significant improvement of the swelling of the left upper extremity.  Follow-up in October for duplex.

## 2020-09-04 ENCOUNTER — APPOINTMENT (OUTPATIENT)
Dept: WOUND CARE | Facility: CLINIC | Age: 70
End: 2020-09-04
Payer: MEDICARE

## 2020-09-04 ENCOUNTER — RESULT REVIEW (OUTPATIENT)
Age: 70
End: 2020-09-04

## 2020-09-04 DIAGNOSIS — E11.9 TYPE 2 DIABETES MELLITUS W/OUT COMPLICATIONS: ICD-10-CM

## 2020-09-04 DIAGNOSIS — L97.922 NON-PRESSURE CHRONIC ULCER OF UNSPECIFIED PART OF LEFT LOWER LEG WITH FAT LAYER EXPOSED: ICD-10-CM

## 2020-09-04 DIAGNOSIS — I73.9 PERIPHERAL VASCULAR DISEASE, UNSPECIFIED: ICD-10-CM

## 2020-09-04 PROCEDURE — 11042 DBRDMT SUBQ TIS 1ST 20SQCM/<: CPT

## 2020-09-04 PROCEDURE — 99204 OFFICE O/P NEW MOD 45 MIN: CPT

## 2020-09-04 RX ORDER — COLLAGENASE SANTYL 250 [ARB'U]/G
250 OINTMENT TOPICAL DAILY
Qty: 1 | Refills: 0 | Status: ACTIVE | COMMUNITY
Start: 2020-09-04 | End: 1900-01-01

## 2020-09-07 ENCOUNTER — APPOINTMENT (OUTPATIENT)
Dept: DISASTER EMERGENCY | Facility: CLINIC | Age: 70
End: 2020-09-07

## 2020-09-07 DIAGNOSIS — Z01.818 ENCOUNTER FOR OTHER PREPROCEDURAL EXAMINATION: ICD-10-CM

## 2020-09-08 LAB — SARS-COV-2 N GENE NPH QL NAA+PROBE: NOT DETECTED

## 2020-09-09 ENCOUNTER — LABORATORY RESULT (OUTPATIENT)
Age: 70
End: 2020-09-09

## 2020-09-09 ENCOUNTER — APPOINTMENT (OUTPATIENT)
Dept: ENDOVASCULAR SURGERY | Facility: CLINIC | Age: 70
End: 2020-09-09
Payer: MEDICARE

## 2020-09-09 VITALS — OXYGEN SATURATION: 100 %

## 2020-09-09 VITALS
TEMPERATURE: 97.4 F | OXYGEN SATURATION: 100 % | BODY MASS INDEX: 28.77 KG/M2 | RESPIRATION RATE: 18 BRPM | WEIGHT: 179 LBS | HEART RATE: 74 BPM | DIASTOLIC BLOOD PRESSURE: 73 MMHG | HEIGHT: 66 IN | SYSTOLIC BLOOD PRESSURE: 132 MMHG

## 2020-09-09 PROCEDURE — 37232Z: CUSTOM | Mod: 59,LT

## 2020-09-09 PROCEDURE — 37227Z: CUSTOM | Mod: LT

## 2020-09-09 PROCEDURE — 37229Z: CUSTOM | Mod: 59,LT

## 2020-09-09 PROCEDURE — 75625 CONTRAST EXAM ABDOMINL AORTA: CPT

## 2020-09-09 RX ORDER — LOSARTAN POTASSIUM 50 MG/1
50 TABLET, FILM COATED ORAL
Refills: 0 | Status: DISCONTINUED | COMMUNITY
End: 2020-09-09

## 2020-09-09 RX ORDER — AMIODARONE HYDROCHLORIDE 200 MG/1
200 TABLET ORAL DAILY
Qty: 14 | Refills: 0 | Status: DISCONTINUED | COMMUNITY
End: 2020-09-09

## 2020-09-09 RX ORDER — SITAGLIPTIN 25 MG/1
25 TABLET, FILM COATED ORAL
Refills: 0 | Status: DISCONTINUED | COMMUNITY
End: 2020-09-09

## 2020-09-14 ENCOUNTER — APPOINTMENT (OUTPATIENT)
Dept: WOUND CARE | Facility: CLINIC | Age: 70
End: 2020-09-14
Payer: MEDICARE

## 2020-09-14 VITALS — TEMPERATURE: 97.2 F

## 2020-09-14 DIAGNOSIS — L97.929 NON-PRESSURE CHRONIC ULCER OF UNSPECIFIED PART OF LEFT LOWER LEG WITH UNSPECIFIED SEVERITY: ICD-10-CM

## 2020-09-14 DIAGNOSIS — I70.242 ATHEROSCLEROSIS OF NATIVE ARTERIES OF LEFT LEG WITH ULCERATION OF CALF: ICD-10-CM

## 2020-09-14 PROCEDURE — 11042 DBRDMT SUBQ TIS 1ST 20SQCM/<: CPT

## 2020-09-14 PROCEDURE — 93923 UPR/LXTR ART STDY 3+ LVLS: CPT

## 2020-09-14 NOTE — PHYSICAL EXAM
[Please See PDF for [employer]] : Please See PDF for [employer]. [Normal Breath Sounds] : Normal breath sounds [Ankle Swelling (On Exam)] : present [Ankle Swelling On The Right] : of the right ankle [Ankle Swelling On The Left] : moderate [Skin Ulcer] : ulcer [Oriented to Person] : oriented to person [Alert] : alert [Oriented to Time] : oriented to time [Oriented to Place] : oriented to place [Calm] : calm [JVD] : no jugular venous distention  [de-identified] : AT [de-identified] : NAD, ambulatory [Abdomen Tenderness] : ~T ~M No abdominal tenderness [de-identified] : soft [de-identified] : supple

## 2020-09-14 NOTE — PLAN
[FreeTextEntry1] : Wound Assessment and Plan:\par \par The patient presents with a wound to the left posterior leg.  Swelling noted to the extremity.  \par RICK/PVR scheduled today and findings reviewed with patient and son.  Improvement of waveforms.  Noncompressible RICK.   s/p excisional debridement performed today of necrotic tissue.\par No clinical sign of infection\par Recommendation:\par \par Apply lidocaine or topical anesthetic if needed to reduce pain upon washing the wound.\par Wash wound with ---- Saline \par Apply ----santyl and gauze\par Change dressing ---daily\par Leg elevation as tolerated\par Encouraged ambulation or exercise.\par Optimization of nutrition.\par Offloading to the wound site.\par \par Wound supplies ordered via DME\par Patient given contact information to DME\par \par -----Homecare orders in place\par \par Follow up appointment scheduled for 2 weeks\par \par TeleHealth Services discussed with the patient and/or family.  Discharge instructions given including download of Sean information regarding:\par 1)  [employer] [facility] to obtain medical records\par 2)  [employer] [facility] to conduct Face-to-Face TeleHealth visit\par 3)  [employer] for the Patient (patient takes a picture of their wound which is sent to the patient's chart for review)\par \par \par

## 2020-09-14 NOTE — REASON FOR VISIT
[Consultation] : a consultation visit [Family Member] : family member [FreeTextEntry1] : L poster leg

## 2020-09-14 NOTE — HISTORY OF PRESENT ILLNESS
[FreeTextEntry1] : Mr. VIOLETTA RENTERIA   presents to the office with a wound for back in June 2020.  patient was previously diagnosed with cellulitis and noticed the wound that was initially a scab that got progressively worse \par The wound is located on  the left posterior leg .  The patient has complaints of pain. \par The patient has been dressing the wound with .  \par The patient denies fevers or chills.  \par The patient has localized pain to the wound upon dressing changes.  The patient has no other complaints or associated symptoms.  HbA1c is unkown. But daily FS ranges 100-170  Patient to follow up with nephrologist today for HbA1c.\par

## 2020-09-15 PROBLEM — L97.922 SKIN ULCER OF LEFT LOWER LEG WITH FAT LAYER EXPOSED: Status: ACTIVE | Noted: 2020-09-15

## 2020-09-15 PROBLEM — I73.9 PAD (PERIPHERAL ARTERY DISEASE): Status: ACTIVE | Noted: 2020-09-08

## 2020-09-15 NOTE — PLAN
[FreeTextEntry1] : Wound Assessment and Plan:\par \par The patient presents with a wound to the -left posterior leg.  Swelling noted to the extremity.  \par RICK/PVR and standing venous reflux study scheduled.\par No clinical sign of infection\par Recommendation:\par \par Apply lidocaine or topical anesthetic if needed to reduce pain upon washing the wound.\par Wash wound with ----0.9% saline/ Dakins 0.125% or Dove skin sensitive soap and clean water \par Apply ----hypdrogel/ santyl/ medical grade honey/ iodosorb/ alginate/foam/\par Apply ----multi-layer compression bandage/ compression stocking/ ACE bandage\par Change dressing ---daily/ every other day/ 3 times per week.\par Leg elevation as tolerated\par Encouraged ambulation or exercise.\par Optimization of nutrition.\par Offloading to the wound site.\par \par ---Group II  Pressure relief mattress/boots recommended\par ---Roho cushion recommended\par ---Offloading shoe recommended\par \par -----Wound supplies ordered via DME\par Patient given contact information to DME\par \par -----Homecare orders in place\par \par Follow up appointment scheduled for 1 week\par \par TeleHealth Services discussed with the patient and/or family.  Discharge instructions given including download of Sean information regarding:\par 1)  [facility] to obtain medical records\par 2)  [employer] [facility] to conduct Face-to-Face TeleHealth visit\par 3)  [employer] for the Patient (patient takes a picture of their wound which is sent to the patient's chart for review)\par

## 2020-09-15 NOTE — HISTORY OF PRESENT ILLNESS
[FreeTextEntry1] : Mr. VIOLETTA RENTERIA   presents to the office with a wound for back in June 2020.  patient was previously diagnosed with cellulitis and noticed the wound that was initially a scab that got progressively worse \par The wound is located on  the left posterior leg .  The patient has complaints of pain. \par The patient has been dressing the wound with gauze-.  \par The patient denies fevers or chills.  \par The patient has localized pain to the wound upon dressing changes.  The patient has no other complaints or associated symptoms.  HbA1c is unkown. But daily FS ranges 100-170\par

## 2020-09-21 ENCOUNTER — APPOINTMENT (OUTPATIENT)
Dept: VASCULAR SURGERY | Facility: CLINIC | Age: 70
End: 2020-09-21

## 2020-09-25 NOTE — HISTORY OF PRESENT ILLNESS
[] : left brachiocephalic fistula [FreeTextEntry1] : creation 5-22-15 Dr Cao [FreeTextEntry4] : yesterday [FreeTextEntry5] : 7pm 9/8/2020 [FreeTextEntry6] : Dr Mccartney

## 2020-09-25 NOTE — PHYSICAL EXAM
[Pulsatile Thrill] : pulsatile thrill [Normal Breath Sounds] : Normal breath sounds [Normal Heart Sounds] : normal heart sounds [2+] : left 2+ [Left Carotid Bruit] : left carotid bruit heard [Ankle Swelling (On Exam)] : present [0] : left 0 [Ankle Swelling On The Right] : mild [Ankle Swelling Bilaterally] : bilaterally  [Skin Ulcer] : ulcer [JVD] : no jugular venous distention  [Abdomen Masses] : No abdominal masses

## 2020-09-25 NOTE — PROCEDURE
[FSBS] : FSBS [D/C IV on discharge] : D/C IV on discharge [Resume diet] : resume diet [Vital signs on admission the q 15 mins x2] : Vital signs on admission the q 15 mins x2 [Site check for bleeding/hematoma/thrill/bruit] : Site check for bleeding/hematoma/thrill/bruit [FreeTextEntry1] : aortogram, left leg angiogram/athrectomy/angioplasty/stent

## 2020-09-25 NOTE — PAST MEDICAL HISTORY
[Increasing age ( >40 years old)] : Increasing age ( >40 years old) [Altered mobility] : Altered mobility [Obesity: BMI >25] : Obesity: BMI >25 [Congestive Heart Failure] : Congestive Heart Failure [Major surgery] : Major surgery [No therapy indicated for cases scheduled for less than one hour] : No therapy indicated for cases scheduled for less than one hour. [FreeTextEntry1] : Malignant Hyperthermis (MH) Screening Tool and Risk of Bleeding Assessement\par \par Mr. VIOLETTA RENTERIA  denies family history of unexpected death following Anesthesia or Exercise.\par Denies Family history of Malignant Hyperthermia, Muscle or Neuromuscular disorder and High Temperature following exercise.\par \par Mr. VIOLETTA RENTERIA denies history of Muscle Spasm, Dark or Chocolate - Colored urine and Unanticipated fever immediately following anesthesia or serious exercise. \par Mr. RENTERIA  also denies bleeding tendencies/ Risks of Bleeding\par

## 2020-09-25 NOTE — REASON FOR VISIT
[Other ___] : a [unfilled] visit for [Swollen Extremity] : swollen extremity [Family Member] : family member [FreeTextEntry2] : PAD/non healing ulcer, left foot pain

## 2020-09-28 ENCOUNTER — APPOINTMENT (OUTPATIENT)
Dept: WOUND CARE | Facility: CLINIC | Age: 70
End: 2020-09-28

## 2020-10-09 ENCOUNTER — APPOINTMENT (OUTPATIENT)
Dept: VASCULAR SURGERY | Facility: CLINIC | Age: 70
End: 2020-10-09
Payer: MEDICARE

## 2020-10-09 VITALS
SYSTOLIC BLOOD PRESSURE: 126 MMHG | WEIGHT: 179 LBS | HEART RATE: 97 BPM | DIASTOLIC BLOOD PRESSURE: 73 MMHG | HEIGHT: 66 IN | BODY MASS INDEX: 28.77 KG/M2

## 2020-10-09 VITALS — TEMPERATURE: 97.9 F

## 2020-10-09 PROCEDURE — 11042 DBRDMT SUBQ TIS 1ST 20SQCM/<: CPT | Mod: LT

## 2020-10-09 PROCEDURE — 93925 LOWER EXTREMITY STUDY: CPT

## 2020-10-09 PROCEDURE — 99213 OFFICE O/P EST LOW 20 MIN: CPT | Mod: 25

## 2020-10-09 NOTE — PROCEDURE
[FreeTextEntry1] : Left posterior calf wound was debrided sharply using the knife.  Skin and subcutaneous tissue was removed.

## 2020-10-09 NOTE — HISTORY OF PRESENT ILLNESS
[FreeTextEntry1] : Patient with severe peripheral vascular disease and left posterior calf wound.  Patient underwent left lower extremity vascular intervention with excellent results.  No fevers or chills.  Significantly improved pain.

## 2020-10-09 NOTE — PHYSICAL EXAM
[JVD] : no jugular venous distention  [Normal Breath Sounds] : Normal breath sounds [Normal Heart Sounds] : normal heart sounds [Left Carotid Bruit] : left carotid bruit heard [2+] : left 2+ [0] : left 0 [Ankle Swelling (On Exam)] : present [Ankle Swelling Bilaterally] : bilaterally  [Ankle Swelling On The Right] : mild [Abdomen Masses] : No abdominal masses [Skin Ulcer] : ulcer [de-identified] : Left posterior calf with mostly granulation tissue within the ulcer with small area of fibrinous exudate.

## 2020-10-09 NOTE — ASSESSMENT
[FreeTextEntry1] : Patient is status post left lower extremity intervention for treatment of ischemic wound of the left calf.  Good results and the wound is healing nicely.  Continue local wound care.  Follow-up in 1 to 3 months for arterial duplex and Dopplers.

## 2020-10-19 ENCOUNTER — APPOINTMENT (OUTPATIENT)
Dept: VASCULAR SURGERY | Facility: CLINIC | Age: 70
End: 2020-10-19

## 2020-12-07 ENCOUNTER — APPOINTMENT (OUTPATIENT)
Dept: VASCULAR SURGERY | Facility: CLINIC | Age: 70
End: 2020-12-07
Payer: MEDICARE

## 2020-12-07 VITALS — TEMPERATURE: 95.3 F

## 2020-12-07 PROCEDURE — 93923 UPR/LXTR ART STDY 3+ LVLS: CPT

## 2020-12-07 PROCEDURE — 93990 DOPPLER FLOW TESTING: CPT

## 2020-12-07 PROCEDURE — 99214 OFFICE O/P EST MOD 30 MIN: CPT

## 2020-12-07 PROCEDURE — 93990 DOPPLER FLOW TESTING: CPT | Mod: 59

## 2020-12-07 NOTE — PHYSICAL EXAM
[JVD] : no jugular venous distention  [Normal Breath Sounds] : Normal breath sounds [Normal Heart Sounds] : normal heart sounds [Left Carotid Bruit] : left carotid bruit heard [2+] : left 2+ [0] : left 0 [Ankle Swelling (On Exam)] : present [Ankle Swelling Bilaterally] : bilaterally  [Ankle Swelling On The Right] : mild [Abdomen Masses] : No abdominal masses [Skin Ulcer] : ulcer [de-identified] : Left posterior calf with mostly granulation tissue within the ulcer with small area of fibrinous exudate.

## 2020-12-07 NOTE — ASSESSMENT
[FreeTextEntry1] : Patient is status post left lower extremity intervention for treatment of ischemic wound of the left calf.  Good results and the wound is healing nicely.  Continue local wound care.  Follow-up in 1 to 3 months for arterial duplex \par Left aVF with adequate flow with moderate stenosis and no difficulty during hemodialysis.  Follow-up in 3 months.

## 2021-01-29 ENCOUNTER — TRANSCRIPTION ENCOUNTER (OUTPATIENT)
Age: 71
End: 2021-01-29

## 2021-02-12 ENCOUNTER — APPOINTMENT (OUTPATIENT)
Dept: VASCULAR SURGERY | Facility: CLINIC | Age: 71
End: 2021-02-12

## 2021-03-01 ENCOUNTER — APPOINTMENT (OUTPATIENT)
Dept: VASCULAR SURGERY | Facility: CLINIC | Age: 71
End: 2021-03-01

## 2021-03-09 NOTE — PHYSICAL THERAPY INITIAL EVALUATION ADULT - PATIENT PROFILE REVIEW, REHAB EVAL
yes/PT re-evaluation; s/p thoracentesis on 6/30 and left chest tube placement on 7/2 , removal on 7/9; EMR, labs, radiology and imaging reports reviewed Trisha Santillan

## 2021-03-29 ENCOUNTER — APPOINTMENT (OUTPATIENT)
Dept: VASCULAR SURGERY | Facility: CLINIC | Age: 71
End: 2021-03-29
Payer: MEDICARE

## 2021-03-29 VITALS — TEMPERATURE: 95.3 F

## 2021-03-29 PROCEDURE — 93990 DOPPLER FLOW TESTING: CPT

## 2021-03-29 PROCEDURE — 99213 OFFICE O/P EST LOW 20 MIN: CPT

## 2021-03-29 PROCEDURE — 93926 LOWER EXTREMITY STUDY: CPT | Mod: 59

## 2021-03-29 PROCEDURE — 93880 EXTRACRANIAL BILAT STUDY: CPT

## 2021-03-29 NOTE — PHYSICAL EXAM
[JVD] : no jugular venous distention  [2+] : left 2+ [Ankle Swelling (On Exam)] : not present [Varicose Veins Of Lower Extremities] : not present [] : not present [No Rash or Lesion] : No rash or lesion [Skin Ulcer] : no ulcer [Alert] : alert [Calm] : calm [de-identified] : appears well  [FreeTextEntry1] : palpable thrill over the left brachial cephalic avf, aneurysms are compressible

## 2021-03-29 NOTE — ASSESSMENT
[FreeTextEntry1] : 69 yo male with history of cad  s/p cabg, dm, hld, htn, chf, pad s/p left lower extremity sfa stent, carotid stenosis, esrd on hd presents for follow up\par \par avf duplex shows 50% stenosis of the juxta anastomosis, proximal and mid forearm and distal stent with flow rate of 1751 - will repeat duplex in 3 months \par left sfa without any evidence of stenosis - will repeat in 3 months \par carotid stenosis shows stable 50-60% stenosis of the right and 16-49% on the left - will repeat in 6 months \par \par pt to continue with asa and plavix and will follow up in 3 months \par pt also advised not to access lightened areas over the avf where the skin is thinned

## 2021-03-29 NOTE — HISTORY OF PRESENT ILLNESS
[FreeTextEntry1] : 69 yo male with history of cad  s/p cabg, dm, hld, htn, chf, pad s/p left lower extremity sfa stent, carotid stenosis, esrd on hd presents for follow up.  pt denies any history of difficulty with avf during hd \par pt denies any stroke like symptoms \par pt denies any lower extremity wounds

## 2021-07-03 ENCOUNTER — INPATIENT (INPATIENT)
Facility: HOSPITAL | Age: 71
LOS: 36 days | Discharge: OTHER INSTITUTION | End: 2021-08-09
Attending: HOSPITALIST | Admitting: HOSPITALIST
Payer: MEDICARE

## 2021-07-03 VITALS
RESPIRATION RATE: 18 BRPM | HEART RATE: 97 BPM | OXYGEN SATURATION: 100 % | WEIGHT: 190.04 LBS | SYSTOLIC BLOOD PRESSURE: 98 MMHG | DIASTOLIC BLOOD PRESSURE: 62 MMHG | HEIGHT: 66 IN | TEMPERATURE: 98 F

## 2021-07-03 DIAGNOSIS — I25.700 ATHEROSCLEROSIS OF CORONARY ARTERY BYPASS GRAFT(S), UNSPECIFIED, WITH UNSTABLE ANGINA PECTORIS: ICD-10-CM

## 2021-07-03 DIAGNOSIS — J96.01 ACUTE RESPIRATORY FAILURE WITH HYPOXIA: ICD-10-CM

## 2021-07-03 DIAGNOSIS — I51.9 HEART DISEASE, UNSPECIFIED: ICD-10-CM

## 2021-07-03 DIAGNOSIS — D62 ACUTE POSTHEMORRHAGIC ANEMIA: ICD-10-CM

## 2021-07-03 DIAGNOSIS — I48.92 UNSPECIFIED ATRIAL FLUTTER: ICD-10-CM

## 2021-07-03 DIAGNOSIS — Z29.9 ENCOUNTER FOR PROPHYLACTIC MEASURES, UNSPECIFIED: ICD-10-CM

## 2021-07-03 DIAGNOSIS — Z98.49 CATARACT EXTRACTION STATUS, UNSPECIFIED EYE: Chronic | ICD-10-CM

## 2021-07-03 DIAGNOSIS — E78.5 HYPERLIPIDEMIA, UNSPECIFIED: ICD-10-CM

## 2021-07-03 DIAGNOSIS — N18.6 END STAGE RENAL DISEASE: ICD-10-CM

## 2021-07-03 DIAGNOSIS — E11.22 TYPE 2 DIABETES MELLITUS WITH DIABETIC CHRONIC KIDNEY DISEASE: ICD-10-CM

## 2021-07-03 DIAGNOSIS — Z95.1 PRESENCE OF AORTOCORONARY BYPASS GRAFT: Chronic | ICD-10-CM

## 2021-07-03 DIAGNOSIS — Z99.2 DEPENDENCE ON RENAL DIALYSIS: Chronic | ICD-10-CM

## 2021-07-03 LAB
ALBUMIN SERPL ELPH-MCNC: 3.4 G/DL — SIGNIFICANT CHANGE UP (ref 3.3–5)
ALP SERPL-CCNC: 72 U/L — SIGNIFICANT CHANGE UP (ref 40–120)
ALT FLD-CCNC: 6 U/L — SIGNIFICANT CHANGE UP (ref 4–41)
ANION GAP SERPL CALC-SCNC: 11 MMOL/L — SIGNIFICANT CHANGE UP (ref 7–14)
APTT BLD: 27.7 SEC — SIGNIFICANT CHANGE UP (ref 27–36.3)
AST SERPL-CCNC: 13 U/L — SIGNIFICANT CHANGE UP (ref 4–40)
BASOPHILS # BLD AUTO: 0.04 K/UL — SIGNIFICANT CHANGE UP (ref 0–0.2)
BASOPHILS NFR BLD AUTO: 0.5 % — SIGNIFICANT CHANGE UP (ref 0–2)
BILIRUB SERPL-MCNC: 0.4 MG/DL — SIGNIFICANT CHANGE UP (ref 0.2–1.2)
BLD GP AB SCN SERPL QL: NEGATIVE — SIGNIFICANT CHANGE UP
BUN SERPL-MCNC: 16 MG/DL — SIGNIFICANT CHANGE UP (ref 7–23)
CALCIUM SERPL-MCNC: 8.5 MG/DL — SIGNIFICANT CHANGE UP (ref 8.4–10.5)
CHLORIDE SERPL-SCNC: 96 MMOL/L — LOW (ref 98–107)
CO2 SERPL-SCNC: 31 MMOL/L — SIGNIFICANT CHANGE UP (ref 22–31)
CREAT SERPL-MCNC: 4.25 MG/DL — HIGH (ref 0.5–1.3)
EOSINOPHIL # BLD AUTO: 0.1 K/UL — SIGNIFICANT CHANGE UP (ref 0–0.5)
EOSINOPHIL NFR BLD AUTO: 1.2 % — SIGNIFICANT CHANGE UP (ref 0–6)
GLUCOSE SERPL-MCNC: 185 MG/DL — HIGH (ref 70–99)
HCT VFR BLD CALC: 29.6 % — LOW (ref 39–50)
HGB BLD-MCNC: 9 G/DL — LOW (ref 13–17)
IANC: 6.91 K/UL — SIGNIFICANT CHANGE UP (ref 1.5–8.5)
IMM GRANULOCYTES NFR BLD AUTO: 0.9 % — SIGNIFICANT CHANGE UP (ref 0–1.5)
INR BLD: 1.44 RATIO — HIGH (ref 0.88–1.16)
LYMPHOCYTES # BLD AUTO: 0.58 K/UL — LOW (ref 1–3.3)
LYMPHOCYTES # BLD AUTO: 6.7 % — LOW (ref 13–44)
MAGNESIUM SERPL-MCNC: 1.8 MG/DL — SIGNIFICANT CHANGE UP (ref 1.6–2.6)
MCHC RBC-ENTMCNC: 28.9 PG — SIGNIFICANT CHANGE UP (ref 27–34)
MCHC RBC-ENTMCNC: 30.4 GM/DL — LOW (ref 32–36)
MCV RBC AUTO: 95.2 FL — SIGNIFICANT CHANGE UP (ref 80–100)
MONOCYTES # BLD AUTO: 0.95 K/UL — HIGH (ref 0–0.9)
MONOCYTES NFR BLD AUTO: 11 % — SIGNIFICANT CHANGE UP (ref 2–14)
NEUTROPHILS # BLD AUTO: 6.91 K/UL — SIGNIFICANT CHANGE UP (ref 1.8–7.4)
NEUTROPHILS NFR BLD AUTO: 79.7 % — HIGH (ref 43–77)
NRBC # BLD: 0 /100 WBCS — SIGNIFICANT CHANGE UP
NRBC # FLD: 0.05 K/UL — HIGH
PHOSPHATE SERPL-MCNC: 3.3 MG/DL — SIGNIFICANT CHANGE UP (ref 2.5–4.5)
PLATELET # BLD AUTO: 165 K/UL — SIGNIFICANT CHANGE UP (ref 150–400)
POTASSIUM SERPL-MCNC: 3.5 MMOL/L — SIGNIFICANT CHANGE UP (ref 3.5–5.3)
POTASSIUM SERPL-SCNC: 3.5 MMOL/L — SIGNIFICANT CHANGE UP (ref 3.5–5.3)
PROT SERPL-MCNC: 5.7 G/DL — LOW (ref 6–8.3)
PROTHROM AB SERPL-ACNC: 16.1 SEC — HIGH (ref 10.6–13.6)
RBC # BLD: 3.11 M/UL — LOW (ref 4.2–5.8)
RBC # FLD: 16 % — HIGH (ref 10.3–14.5)
RH IG SCN BLD-IMP: POSITIVE — SIGNIFICANT CHANGE UP
SODIUM SERPL-SCNC: 138 MMOL/L — SIGNIFICANT CHANGE UP (ref 135–145)
WBC # BLD: 8.66 K/UL — SIGNIFICANT CHANGE UP (ref 3.8–10.5)
WBC # FLD AUTO: 8.66 K/UL — SIGNIFICANT CHANGE UP (ref 3.8–10.5)

## 2021-07-03 PROCEDURE — 99223 1ST HOSP IP/OBS HIGH 75: CPT

## 2021-07-03 PROCEDURE — 93010 ELECTROCARDIOGRAM REPORT: CPT

## 2021-07-03 RX ORDER — CALCIUM ACETATE 667 MG
1 TABLET ORAL
Qty: 0 | Refills: 0 | DISCHARGE

## 2021-07-03 RX ORDER — SODIUM CHLORIDE 9 MG/ML
1000 INJECTION, SOLUTION INTRAVENOUS
Refills: 0 | Status: DISCONTINUED | OUTPATIENT
Start: 2021-07-03 | End: 2021-07-28

## 2021-07-03 RX ORDER — GLUCAGON INJECTION, SOLUTION 0.5 MG/.1ML
1 INJECTION, SOLUTION SUBCUTANEOUS ONCE
Refills: 0 | Status: DISCONTINUED | OUTPATIENT
Start: 2021-07-03 | End: 2021-07-28

## 2021-07-03 RX ORDER — DEXTROSE 50 % IN WATER 50 %
25 SYRINGE (ML) INTRAVENOUS ONCE
Refills: 0 | Status: DISCONTINUED | OUTPATIENT
Start: 2021-07-03 | End: 2021-07-28

## 2021-07-03 RX ORDER — AMIODARONE HYDROCHLORIDE 400 MG/1
200 TABLET ORAL DAILY
Refills: 0 | Status: DISCONTINUED | OUTPATIENT
Start: 2021-07-03 | End: 2021-07-09

## 2021-07-03 RX ORDER — DEXTROSE 50 % IN WATER 50 %
12.5 SYRINGE (ML) INTRAVENOUS ONCE
Refills: 0 | Status: DISCONTINUED | OUTPATIENT
Start: 2021-07-03 | End: 2021-07-28

## 2021-07-03 RX ORDER — INSULIN LISPRO 100/ML
VIAL (ML) SUBCUTANEOUS EVERY 6 HOURS
Refills: 0 | Status: DISCONTINUED | OUTPATIENT
Start: 2021-07-03 | End: 2021-07-04

## 2021-07-03 RX ORDER — METOPROLOL TARTRATE 50 MG
25 TABLET ORAL
Refills: 0 | Status: DISCONTINUED | OUTPATIENT
Start: 2021-07-03 | End: 2021-07-12

## 2021-07-03 RX ORDER — TIOTROPIUM BROMIDE 18 UG/1
2.5 CAPSULE ORAL; RESPIRATORY (INHALATION)
Qty: 0 | Refills: 0 | DISCHARGE

## 2021-07-03 RX ORDER — GLYCOPYRROLATE AND FORMOTEROL FUMARATE 9; 4.8 UG/1; UG/1
2 AEROSOL, METERED RESPIRATORY (INHALATION)
Qty: 0 | Refills: 0 | DISCHARGE

## 2021-07-03 RX ORDER — ATORVASTATIN CALCIUM 80 MG/1
40 TABLET, FILM COATED ORAL AT BEDTIME
Refills: 0 | Status: DISCONTINUED | OUTPATIENT
Start: 2021-07-03 | End: 2021-07-30

## 2021-07-03 RX ORDER — LIDOCAINE 4 G/100G
1 CREAM TOPICAL
Qty: 0 | Refills: 0 | DISCHARGE

## 2021-07-03 RX ORDER — ACETAMINOPHEN 500 MG
650 TABLET ORAL EVERY 6 HOURS
Refills: 0 | Status: DISCONTINUED | OUTPATIENT
Start: 2021-07-03 | End: 2021-07-08

## 2021-07-03 RX ORDER — DEXTROSE 50 % IN WATER 50 %
15 SYRINGE (ML) INTRAVENOUS ONCE
Refills: 0 | Status: DISCONTINUED | OUTPATIENT
Start: 2021-07-03 | End: 2021-07-28

## 2021-07-03 RX ORDER — ASPIRIN/CALCIUM CARB/MAGNESIUM 324 MG
81 TABLET ORAL DAILY
Refills: 0 | Status: DISCONTINUED | OUTPATIENT
Start: 2021-07-03 | End: 2021-07-19

## 2021-07-03 RX ORDER — AZELASTINE HCL 0.05 %
1 DROPS OPHTHALMIC (EYE)
Qty: 0 | Refills: 0 | DISCHARGE

## 2021-07-03 RX ORDER — ALBUTEROL 90 UG/1
2 AEROSOL, METERED ORAL
Qty: 0 | Refills: 0 | DISCHARGE

## 2021-07-03 RX ORDER — PANTOPRAZOLE SODIUM 20 MG/1
40 TABLET, DELAYED RELEASE ORAL EVERY 12 HOURS
Refills: 0 | Status: DISCONTINUED | OUTPATIENT
Start: 2021-07-03 | End: 2021-07-22

## 2021-07-03 RX ADMIN — ATORVASTATIN CALCIUM 40 MILLIGRAM(S): 80 TABLET, FILM COATED ORAL at 21:50

## 2021-07-03 RX ADMIN — PANTOPRAZOLE SODIUM 40 MILLIGRAM(S): 20 TABLET, DELAYED RELEASE ORAL at 21:51

## 2021-07-03 NOTE — H&P ADULT - ASSESSMENT
71 yo M with PMhx of CAD s/p CABG (course at that time complicated by pleural effusion requiring chest tubes), ESRD on HD (MWF), DMII, HTN, afib on coumadin, and anemia presented from Health system for evaluation of chest pain and GIB.

## 2021-07-03 NOTE — H&P ADULT - PROBLEM SELECTOR PLAN 5
Aflutter with RBBB   -Tele shows HR <90   -C/w metoprolol 25 mg BID   -[id number] score is 7. However, hold warfarin for now as patient reported to have GIB bleeding Aflutter with RBBB   -Tele shows HR <90   -C/w metoprolol 25 mg BID and amiodarone 200 mg daily  -[id number] score is 7. However, hold warfarin for now as patient reported to have GIB bleeding

## 2021-07-03 NOTE — H&P ADULT - NSHPSOCIALHISTORY_GEN_ALL_CORE
He lives with his son. Denies any hx of smoking or alcohol consumption. No illicit drug use. Can perform some ADLs.

## 2021-07-03 NOTE — H&P ADULT - HISTORY OF PRESENT ILLNESS
71 yo M with PMhx of CAD s/p CABG (course at that time complicated by pleural effusion requiring chest tubes), ESRD on HD (MWF), DMII, HTN, afib on coumadin, and anemia presented from [facility] for evaluation of chest pain and GIB. Patient is AAOX2 and was able to provide some information. Most of the information was obtained from charting. Patient was admitted to [facility] after developing chest pain during HD. He was found to have Hgb of 6 and elevated trops concerning for GIB and NSTEM II. He was give 2 units of pRBCs and monitored off warfarin/aspirin. He underwent bowel prep but it did not show any bloody stool. As a result, no colonoscopy was performed. His course was complicated by aflutter/afib with RVR. He was managed with metoprolol and never required cardioversion. On day of transfer, his Hgb was in the 8s. Patient was deemed stable enough to be restarted on aspirin and warfarin. Patient was transferred to [facility] for further evaluation. Currently, patient have no symptoms but reports to have intermittent chest pain, that is suprasternal, non-radiating, exertional at times, and non-reproducible on palpation. it improves with rest and worsens with movement. He denies any fever, chills, cough, orthopnea, PND, LE edema, abdominal pain, diarrhea, or dysuria.    On the floor, vitals are WNL.

## 2021-07-03 NOTE — H&P ADULT - PROBLEM SELECTOR PLAN 8
DVT ppx-SCDs   Transitions of Care Status:  1.  Name of PCP:  2.  PCP Contacted on Admission: [ ] Y    [ ] N    3.  PCP contacted at Discharge: [ ] Y    [ ] N    [ ] N/A  4.  Post-Discharge Appointment Date and Location:  5.  Summary of Handoff given to PCP:

## 2021-07-03 NOTE — H&P ADULT - NSHPPHYSICALEXAM_GEN_ALL_CORE
Vital Signs Last 24 Hrs  T(C): 36.8 (03 Jul 2021 19:45), Max: 36.8 (03 Jul 2021 19:45)  T(F): 98.2 (03 Jul 2021 19:45), Max: 98.2 (03 Jul 2021 19:45)  HR: 97 (03 Jul 2021 19:45) (97 - 97)  BP: 98/62 (03 Jul 2021 19:45) (98/62 - 98/62)  BP(mean): --  RR: 18 (03 Jul 2021 19:45) (18 - 18)  SpO2: 100% (03 Jul 2021 19:45) (100% - 100%)      GENERAL: NAD, well-developed  HEENT:  Atraumatic, Normocephalic, Conjunctiva and sclera clear, oral mucosa moist, clear w/o any exudate   NECK: Supple, no lymphadenopathy   CHEST/LUNG: Clear to auscultation bilaterally; No wheeze  HEART: irregular rate and rhythm; No murmurs, rubs, or gallops  ABDOMEN: Soft, mildly tender all over, bloated; Bowel sounds present  EXTREMITIES:  2+ Peripheral Pulses, No clubbing, cyanosis, or edema. Palpable L arm fistula   PSYCH: AAOx2, normal affect  NEUROLOGY: non-focal, moving all extremities.   SKIN: No rashes or lesions

## 2021-07-03 NOTE — H&P ADULT - NSHPLABSRESULTS_GEN_ALL_CORE
9.0    8.66  )-----------( 165      ( 03 Jul 2021 21:15 )             29.6     Hgb Trend: 9.0<--        Creatinine Trend:   PT/INR - ( 03 Jul 2021 21:15 )   PT: 16.1 sec;   INR: 1.44 ratio         PTT - ( 03 Jul 2021 21:15 )  PTT:27.7 sec      EKG reviewed by me  Finding: Aflutter with RBBB, unchanged from last year.

## 2021-07-03 NOTE — H&P ADULT - PROBLEM SELECTOR PLAN 2
Initially presented with anemia with hgb of 6 at OSH   -Fecal occult was positive. S/p 2 units of pRBCs. No further episode of active bleeding at the OSH  -Unclear if patient had an actual GIB vs AOCD   -Monitor CBC q12 hrs. Active type and screen and IV access x2  -PPI 40 IV BID  -Consult GI in the AM if patient's Hgb is downtrending   -C/w aspirin for now. Hold warfarin for now

## 2021-07-03 NOTE — H&P ADULT - NSICDXPASTMEDICALHX_GEN_ALL_CORE_FT
PAST MEDICAL HISTORY:  CAD (Coronary Artery Disease)     Coronary Stent CABG in 2019    DM (Diabetes Mellitus) x 4 yrs without N/N/R    ESRD (end stage renal disease) on dialysis MWF    H/O: CVA after cardiac stent placed 12/15/09 -no residual    Heart Attack 2/1/07 with subsequent stent    HTN (Hypertension)     Hypercholesterolemia     Hyperthyroidism     Stented coronary artery total 15 stents from [id number]

## 2021-07-03 NOTE — H&P ADULT - PROBLEM SELECTOR PLAN 1
Patient initially presented with left sided chest pain with both rest and exertion in the setting of extensive CAD with CABG   -EKG is stable w/o any acute changes. Trops initially elevated and then downtrended at OSH   -Most likely unstable angina. C/w aspirin and metoprolol 25 mg BID   -F/u with TTE in the AM   -Consider inpatient vs. outpatient nuclear stress test as patient had positive trops in the setting of GIB/demand  -Consult cardiology in the am

## 2021-07-03 NOTE — H&P ADULT - PROBLEM SELECTOR PLAN 3
Acute hypoxic respiratory failure due to CHF vs pleural effusion vs ESRD   -F/u with CXR   -No reported fever, leukocytosis or cough as per OSH documents, less concerning for PNA  -F/u with TTE   -C/w fluid removal with HD   -Monitor O2 and wean off oxygen as tolerated

## 2021-07-04 LAB
A1C WITH ESTIMATED AVERAGE GLUCOSE RESULT: 5.5 % — SIGNIFICANT CHANGE UP (ref 4–5.6)
ALBUMIN SERPL ELPH-MCNC: 3.6 G/DL — SIGNIFICANT CHANGE UP (ref 3.3–5)
ALP SERPL-CCNC: 71 U/L — SIGNIFICANT CHANGE UP (ref 40–120)
ALT FLD-CCNC: 7 U/L — SIGNIFICANT CHANGE UP (ref 4–41)
ANION GAP SERPL CALC-SCNC: 14 MMOL/L — SIGNIFICANT CHANGE UP (ref 7–14)
AST SERPL-CCNC: 13 U/L — SIGNIFICANT CHANGE UP (ref 4–40)
BASOPHILS # BLD AUTO: 0.04 K/UL — SIGNIFICANT CHANGE UP (ref 0–0.2)
BASOPHILS NFR BLD AUTO: 0.5 % — SIGNIFICANT CHANGE UP (ref 0–2)
BILIRUB SERPL-MCNC: 0.5 MG/DL — SIGNIFICANT CHANGE UP (ref 0.2–1.2)
BUN SERPL-MCNC: 18 MG/DL — SIGNIFICANT CHANGE UP (ref 7–23)
CALCIUM SERPL-MCNC: 8.9 MG/DL — SIGNIFICANT CHANGE UP (ref 8.4–10.5)
CHLORIDE SERPL-SCNC: 98 MMOL/L — SIGNIFICANT CHANGE UP (ref 98–107)
CK MB CFR SERPL CALC: 3.2 NG/ML — SIGNIFICANT CHANGE UP
CO2 SERPL-SCNC: 28 MMOL/L — SIGNIFICANT CHANGE UP (ref 22–31)
COVID-19 SPIKE DOMAIN AB INTERP: POSITIVE
COVID-19 SPIKE DOMAIN ANTIBODY RESULT: >250 U/ML — HIGH
CREAT SERPL-MCNC: 5.01 MG/DL — HIGH (ref 0.5–1.3)
EOSINOPHIL # BLD AUTO: 0.15 K/UL — SIGNIFICANT CHANGE UP (ref 0–0.5)
EOSINOPHIL NFR BLD AUTO: 2 % — SIGNIFICANT CHANGE UP (ref 0–6)
ESTIMATED AVERAGE GLUCOSE: 111 — SIGNIFICANT CHANGE UP
GLUCOSE BLDC GLUCOMTR-MCNC: 127 MG/DL — HIGH (ref 70–99)
GLUCOSE BLDC GLUCOMTR-MCNC: 131 MG/DL — HIGH (ref 70–99)
GLUCOSE BLDC GLUCOMTR-MCNC: 132 MG/DL — HIGH (ref 70–99)
GLUCOSE BLDC GLUCOMTR-MCNC: 156 MG/DL — HIGH (ref 70–99)
GLUCOSE BLDC GLUCOMTR-MCNC: 159 MG/DL — HIGH (ref 70–99)
GLUCOSE SERPL-MCNC: 125 MG/DL — HIGH (ref 70–99)
HCT VFR BLD CALC: 28.8 % — LOW (ref 39–50)
HGB BLD-MCNC: 8.6 G/DL — LOW (ref 13–17)
IANC: 5.47 K/UL — SIGNIFICANT CHANGE UP (ref 1.5–8.5)
IMM GRANULOCYTES NFR BLD AUTO: 0.8 % — SIGNIFICANT CHANGE UP (ref 0–1.5)
LIDOCAIN IGE QN: 55 U/L — SIGNIFICANT CHANGE UP (ref 7–60)
LYMPHOCYTES # BLD AUTO: 0.89 K/UL — LOW (ref 1–3.3)
LYMPHOCYTES # BLD AUTO: 11.9 % — LOW (ref 13–44)
MAGNESIUM SERPL-MCNC: 1.8 MG/DL — SIGNIFICANT CHANGE UP (ref 1.6–2.6)
MCHC RBC-ENTMCNC: 28.8 PG — SIGNIFICANT CHANGE UP (ref 27–34)
MCHC RBC-ENTMCNC: 29.9 GM/DL — LOW (ref 32–36)
MCV RBC AUTO: 96.3 FL — SIGNIFICANT CHANGE UP (ref 80–100)
MONOCYTES # BLD AUTO: 0.89 K/UL — SIGNIFICANT CHANGE UP (ref 0–0.9)
MONOCYTES NFR BLD AUTO: 11.9 % — SIGNIFICANT CHANGE UP (ref 2–14)
NEUTROPHILS # BLD AUTO: 5.47 K/UL — SIGNIFICANT CHANGE UP (ref 1.8–7.4)
NEUTROPHILS NFR BLD AUTO: 72.9 % — SIGNIFICANT CHANGE UP (ref 43–77)
NRBC # BLD: 0 /100 WBCS — SIGNIFICANT CHANGE UP
NRBC # FLD: 0.03 K/UL — HIGH
NT-PROBNP SERPL-SCNC: HIGH PG/ML
PHOSPHATE SERPL-MCNC: 3.5 MG/DL — SIGNIFICANT CHANGE UP (ref 2.5–4.5)
PLATELET # BLD AUTO: 146 K/UL — LOW (ref 150–400)
POTASSIUM SERPL-MCNC: 3.4 MMOL/L — LOW (ref 3.5–5.3)
POTASSIUM SERPL-SCNC: 3.4 MMOL/L — LOW (ref 3.5–5.3)
PROT SERPL-MCNC: 6.1 G/DL — SIGNIFICANT CHANGE UP (ref 6–8.3)
RBC # BLD: 2.99 M/UL — LOW (ref 4.2–5.8)
RBC # FLD: 16.5 % — HIGH (ref 10.3–14.5)
SARS-COV-2 IGG+IGM SERPL QL IA: >250 U/ML — HIGH
SARS-COV-2 IGG+IGM SERPL QL IA: POSITIVE
SARS-COV-2 RNA SPEC QL NAA+PROBE: SIGNIFICANT CHANGE UP
SODIUM SERPL-SCNC: 140 MMOL/L — SIGNIFICANT CHANGE UP (ref 135–145)
TROPONIN T, HIGH SENSITIVITY RESULT: 162 NG/L — CRITICAL HIGH
TROPONIN T, HIGH SENSITIVITY RESULT: 165 NG/L — CRITICAL HIGH
WBC # BLD: 7.5 K/UL — SIGNIFICANT CHANGE UP (ref 3.8–10.5)
WBC # FLD AUTO: 7.5 K/UL — SIGNIFICANT CHANGE UP (ref 3.8–10.5)

## 2021-07-04 PROCEDURE — 71045 X-RAY EXAM CHEST 1 VIEW: CPT | Mod: 26

## 2021-07-04 RX ORDER — ERYTHROPOIETIN 10000 [IU]/ML
20000 INJECTION, SOLUTION INTRAVENOUS; SUBCUTANEOUS
Refills: 0 | Status: DISCONTINUED | OUTPATIENT
Start: 2021-07-04 | End: 2021-07-17

## 2021-07-04 RX ORDER — INSULIN LISPRO 100/ML
VIAL (ML) SUBCUTANEOUS AT BEDTIME
Refills: 0 | Status: DISCONTINUED | OUTPATIENT
Start: 2021-07-04 | End: 2021-07-13

## 2021-07-04 RX ORDER — INSULIN LISPRO 100/ML
VIAL (ML) SUBCUTANEOUS
Refills: 0 | Status: DISCONTINUED | OUTPATIENT
Start: 2021-07-04 | End: 2021-07-14

## 2021-07-04 RX ADMIN — AMIODARONE HYDROCHLORIDE 200 MILLIGRAM(S): 400 TABLET ORAL at 05:41

## 2021-07-04 RX ADMIN — PANTOPRAZOLE SODIUM 40 MILLIGRAM(S): 20 TABLET, DELAYED RELEASE ORAL at 18:08

## 2021-07-04 RX ADMIN — Medication 25 MILLIGRAM(S): at 05:41

## 2021-07-04 RX ADMIN — ATORVASTATIN CALCIUM 40 MILLIGRAM(S): 80 TABLET, FILM COATED ORAL at 22:06

## 2021-07-04 RX ADMIN — Medication 1: at 01:40

## 2021-07-04 RX ADMIN — Medication 81 MILLIGRAM(S): at 11:45

## 2021-07-04 RX ADMIN — PANTOPRAZOLE SODIUM 40 MILLIGRAM(S): 20 TABLET, DELAYED RELEASE ORAL at 05:41

## 2021-07-04 NOTE — CONSULT NOTE ADULT - SUBJECTIVE AND OBJECTIVE BOX
[facility] - NII Knight / NII Mendiola / JASWANT Arroyo/ NII Mackey/ NII Macias/ BRIGITTE Esposito / ARAMIS Calle / LUDA Bowen  -------------------------------------------------------------------------------------------------------  The patient seen and examined today.  HPI:  69 yo M with PMhx of CAD s/p CABG (course at that time complicated by pleural effusion requiring chest tubes), ESRD on HD (MWF), DMII, HTN, afib on coumadin, and anemia presented from [facility] for evaluation of chest pain and GIB. Patient is AAOX2 and was able to provide some information. Most of the information was obtained from charting. Patient was admitted to [facility] after developing chest pain during HD. He was found to have Hgb of 6 and elevated trops concerning for GIB and NSTEM II. He was give 2 units of pRBCs and monitored off warfarin/aspirin. He underwent bowel prep but it did not show any bloody stool. As a result, no colonoscopy was performed. His course was complicated by aflutter/afib with RVR. He was managed with metoprolol and never required cardioversion. On day of transfer, his Hgb was in the 8s. Patient was deemed stable enough to be restarted on aspirin and warfarin. Patient was transferred to [facility] for further evaluation. Currently, patient have no symptoms but reports to have intermittent chest pain, that is suprasternal, non-radiating, exertional at times, and non-reproducible on palpation. it improves with rest and worsens with movement. He denies any fever, chills, cough, orthopnea, PND, LE edema, abdominal pain, diarrhea, or dysuria.    On the floor, vitals are WNL.  (03 Jul 2021 21:11)      PAST MEDICAL & SURGICAL HISTORY:  HTN (Hypertension)    DM (Diabetes Mellitus)  x 4 yrs without N/N/R    CAD (Coronary Artery Disease)    Hypercholesterolemia    Coronary Stent  CABG in 2019    Heart Attack  2/1/07 with subsequent stent    H/O: CVA  after cardiac stent placed 12/15/09 -no residual    Hyperthyroidism    Stented coronary artery  total 15 stents from [id number]    ESRD (end stage renal disease) on dialysis  MWF    Boil of Buttock  2006 and 2008    S/P cataract extraction    Hemodialysis access, AV graft  5/2015, L arm    S/P CABG x 4      Allergies :- lisinopril (Other)  opioid-like analgesics (Other)    Home Medications Reviewed  Hospital Medications:   MEDICATIONS  (STANDING):  aMIOdarone    Tablet 200 milliGRAM(s) Oral daily  aspirin  chewable 81 milliGRAM(s) Oral daily  atorvastatin 40 milliGRAM(s) Oral at bedtime  dextrose 40% Gel 15 Gram(s) Oral once  dextrose 5%. 1000 milliLiter(s) (50 mL/Hr) IV Continuous <Continuous>  dextrose 5%. 1000 milliLiter(s) (100 mL/Hr) IV Continuous <Continuous>  dextrose 50% Injectable 25 Gram(s) IV Push once  dextrose 50% Injectable 12.5 Gram(s) IV Push once  dextrose 50% Injectable 25 Gram(s) IV Push once  glucagon  Injectable 1 milliGRAM(s) IntraMuscular once  insulin lispro (ADMELOG) corrective regimen sliding scale   SubCutaneous every 6 hours  metoprolol tartrate 25 milliGRAM(s) Oral two times a day  pantoprazole  Injectable 40 milliGRAM(s) IV Push every 12 hours    SOCIAL HISTORY:  Denies ETOh,Smoking,   FAMILY HISTORY:  Family history of diabetes mellitus (Sibling)    Family history of coronary artery disease        REVIEW OF SYSTEMS:  CONSTITUTIONAL: No weakness, fevers or chills  EYES/ENT: No visual changes;  No vertigo or throat pain   NECK: No pain or stiffness  RESPIRATORY: No cough, wheezing, hemoptysis; No shortness of breath  CARDIOVASCULAR: No chest pain or palpitations.  GASTROINTESTINAL: No abdominal or epigastric pain. No nausea, vomiting, or hematemesis; No diarrhea or constipation. No melena or hematochezia.  GENITOURINARY: No dysuria, frequency, foamy urine, urinary urgency, incontinence or hematuria  NEUROLOGICAL: No numbness or weakness  SKIN: No itching, burning, rashes, or lesions   VASCULAR: No bilateral lower extremity edema.   All other review of systems is negative unless indicated above.    VITALS:  T(F): 98.3 (07-04-21 @ 05:40), Max: 98.3 (07-04-21 @ 05:40)  HR: 98 (07-04-21 @ 05:40)  BP: 108/66 (07-04-21 @ 05:40)  RR: 18 (07-04-21 @ 05:40)  SpO2: 100% (07-04-21 @ 05:40)  Wt(kg): --    Height (cm): 167.6 (07-03 @ 19:45)  Weight (kg): 86.2 (07-03 @ 19:45)  BMI (kg/m2): 30.7 (07-03 @ 19:45)  BSA (m2): 1.96 (07-03 @ 19:45)    PHYSICAL EXAM:  Constitutional: NAD  HEENT: anicteric sclera, oropharynx clear, MMM  Neck: supple.   Respiratory: Bilateral equal breath sounds , no wheezes, no crackles  Cardiovascular: S1, S2, Regular, Murmur present.  Gastrointestinal: Bowel Sound present, soft, NT/ND  Extremities: No cyanosis or clubbing. No peripheral edema  Neurological: Alert and oriented x 3, no focal deficits  Psychiatric: Normal mood, normal affect  : No CVA tenderness. No wagner.   Skin: No rashes    Data:  07-04    140  |  98  |  18  ----------------------------<  125<H>  3.4<L>   |  28  |  5.01<H>    Ca    8.9      04 Jul 2021 06:55  Phos  3.5     07-04  Mg     1.80     07-04    TPro  6.1  /  Alb  3.6  /  TBili  0.5  /  DBili      /  AST  13  /  ALT  7   /  AlkPhos  71  07-04    Creatinine Trend: 5.01 <--, 4.25 <--                        8.6    7.50  )-----------( 146      ( 04 Jul 2021 06:58 )             28.8     Urine Studies:

## 2021-07-04 NOTE — CONSULT NOTE ADULT - ASSESSMENT
69 yo M with PMhx of CAD s/p CABG (course at that time complicated by pleural effusion requiring chest tubes), ESRD on HD (MWF), DMII, HTN, afib on coumadin, and anemia presented from [facility] for evaluation of chest pain and GIB.    ESRD  Continue MWF schedule  Renal diet  dialy BMP    Anemia  Epo with HD    Renal osteodystrophy  Phos at goal      Thank you for allowing me to participate in the care of your patient    For any question, call:  Cell # [phone]  Pager # [phone]  Callback # [phone]

## 2021-07-04 NOTE — CONSULT NOTE ADULT - SUBJECTIVE AND OBJECTIVE BOX
HISTORY OF PRESENT ILLNESS: HPI:  Patient is a 69 y/o male well known to our office with PMH of CAD s/p multiple stents, s/p CABG (9/2019 course c/b Afib and LT pleural effusion), HTN, HLD, DM2, Afib on coumadin, ESRD on HD, and hyperthyroidism who presented to [facility] for chest pain and poss GIB, severe anemia Hgb of 6 requiring PRBCs found to have elevated troponin. Patient transferred to [facility] for further evaluation. Cardiology consulted for further evaluation. Patient denies active chest pain. Reports intermittent chest pain that occurs at rest and with exertion but not all the time. Denies SOB, palpitations, dizziness, syncope, melena or hematochezia, fever/chills.    PAST MEDICAL & SURGICAL HISTORY:  HTN (Hypertension)    DM (Diabetes Mellitus)  x 4 yrs without N/N/R    CAD (Coronary Artery Disease)    Hypercholesterolemia    Coronary Stent  CABG in 2019    Heart Attack  2/1/07 with subsequent stent    H/O: CVA  after cardiac stent placed 12/15/09 -no residual    Hyperthyroidism    Stented coronary artery  total 15 stents from 3003-7903    ESRD (end stage renal disease) on dialysis  MWF    Boil of Buttock  2006 and 2008    S/P cataract extraction    Hemodialysis access, AV graft  5/2015, L arm    S/P CABG x 4            MEDICATIONS:  MEDICATIONS  (STANDING):  aMIOdarone    Tablet 200 milliGRAM(s) Oral daily  aspirin  chewable 81 milliGRAM(s) Oral daily  atorvastatin 40 milliGRAM(s) Oral at bedtime  dextrose 40% Gel 15 Gram(s) Oral once  dextrose 5%. 1000 milliLiter(s) (50 mL/Hr) IV Continuous <Continuous>  dextrose 5%. 1000 milliLiter(s) (100 mL/Hr) IV Continuous <Continuous>  dextrose 50% Injectable 25 Gram(s) IV Push once  dextrose 50% Injectable 12.5 Gram(s) IV Push once  dextrose 50% Injectable 25 Gram(s) IV Push once  epoetin danilo-epbx (RETACRIT) Injectable 76160 Unit(s) IV Push <User Schedule>  glucagon  Injectable 1 milliGRAM(s) IntraMuscular once  insulin lispro (ADMELOG) corrective regimen sliding scale   SubCutaneous every 6 hours  metoprolol tartrate 25 milliGRAM(s) Oral two times a day  pantoprazole  Injectable 40 milliGRAM(s) IV Push every 12 hours      Allergies    lisinopril (Other)  opioid-like analgesics (Other)    Intolerances        FAMILY HISTORY:  Family history of diabetes mellitus (Sibling)    Family history of coronary artery disease      Non-contributary for premature coronary disease or sudden cardiac death    SOCIAL HISTORY:    [ x] Non-smoker  [ ] Smoker  [ ] Alcohol    FLU VACCINE THIS YEAR STARTS IN AUGUST:  [ ] Yes    [ ] No    IF OVER 65 HAVE YOU EVER HAD A PNA VACCINE:  [ ] Yes    [ ] No       [ ] N/A      REVIEW OF SYSTEMS:  [x ]chest pain  [  ]shortness of breath  [  ]palpitations  [  ]syncope  [ ]near syncope [ ]upper extremity weakness   [ ] lower extremity weakness  [  ]diplopia  [  ]altered mental status   [  ]fevers  [ ]chills [ ]nausea  [ ]vomitting  [  ]dysphagia    [ ]abdominal pain  [ ]melena  [ ]BRBPR    [  ]epistaxis  [  ]rash    [ ]lower extremity edema        [x ] All others negative	  [ ] Unable to obtain      LABS:	 	    CARDIAC MARKERS:                              8.6    7.50  )-----------( 146      ( 04 Jul 2021 06:58 )             28.8     Hb Trend: 8.6<--, 9.0<--    07-04    140  |  98  |  18  ----------------------------<  125<H>  3.4<L>   |  28  |  5.01<H>    Ca    8.9      04 Jul 2021 06:55  Phos  3.5     07-04  Mg     1.80     07-04    TPro  6.1  /  Alb  3.6  /  TBili  0.5  /  DBili  x   /  AST  13  /  ALT  7   /  AlkPhos  71  07-04    Creatinine Trend: 5.01<--, 4.25<--    Coags:  PT/INR - ( 03 Jul 2021 21:15 )   PT: 16.1 sec;   INR: 1.44 ratio         PTT - ( 03 Jul 2021 21:15 )  PTT:27.7 sec    proBNP: Serum Pro-Brain Natriuretic Peptide: 48777 pg/mL (07-04 @ 07:02)    Lipid Profile:   HgA1c:   TSH:         PHYSICAL EXAM:  T(C): 36.7 (07-04-21 @ 11:58), Max: 36.8 (07-03-21 @ 19:45)  HR: 87 (07-04-21 @ 11:58) (87 - 98)  BP: 104/57 (07-04-21 @ 11:58) (98/62 - 108/66)  RR: 16 (07-04-21 @ 11:58) (16 - 18)  SpO2: 100% (07-04-21 @ 11:58) (100% - 100%)  Wt(kg): --   BMI (kg/m2): 30.7 (07-03-21 @ 19:45)  I&O's Summary    Gen: Appears well in NAD  HEENT:  (-)icterus (-)pallor  CV: N S1 S2 1/6 DEJAH (+)2 Pulses B/l  Resp:  Clear to auscultation B/L, normal effort  GI: (+) BS Soft, NT, ND  Lymph:  (+) LE Edema, (-)obvious lymphadenopathy  Skin: Warm to touch, Normal turgor  Psych: Appropriate mood and affect    TELEMETRY: AF 90s	      ECG: AFL, RBBB  	    RADIOLOGY:         CXR: Pending    ASSESSMENT/PLAN: Patient is a 69 y/o male well known to our office with PMH of CAD s/p multiple stents, s/p CABG (9/2019 course c/b Afib and LT pleural effusion), HTN, HLD, DM2, Afib on coumadin, ESRD on HD, and hyperthyroidism who presented to [facility] for chest pain and poss GIB, severe anemia Hgb of 6 requiring PRBCs found to have elevated troponin. Patient transferred to [facility] for further evaluation. Cardiology consulted for further evaluation.    - No active chest pain currently  - Elevated troponin noted in setting of anemia and renal disease - continue to trend until peak and please check CK/CKMB  - Unclear covid status pending PCR - patient denies any covid related symptoms and was fully vaccinated a couple months ago  - Follow up CXR  - Continue medical management of CAD with ASA/Statin if okay with GI  - Continue amio and metoprolol  - AC with coumadin on hold until cleared by GI  - GI eval with Dr. Roe called  - Fluid removal with HD per renal  - Check TTE to eval LV function and r/o new wall motion abnormalities if able to come off isolation pending covid swab  - Further recs pending above and covid status    Wilfred Robin PA-C  Pager: [phone]

## 2021-07-04 NOTE — CONSULT NOTE ADULT - ASSESSMENT
Agree with above assessment and plan as outlined above.    - GI Eval Dr. Wynn/Jyothi/Jordan  - Awaiting COVID swab  - monitor H/H    Murphy Colin MD, [facility]  BEEPER [phone]

## 2021-07-05 LAB
BASOPHILS # BLD AUTO: 0.06 K/UL — SIGNIFICANT CHANGE UP (ref 0–0.2)
BASOPHILS NFR BLD AUTO: 0.9 % — SIGNIFICANT CHANGE UP (ref 0–2)
CK MB BLD-MCNC: 8.9 % — HIGH (ref 0–2.5)
CK MB CFR SERPL CALC: 3.1 NG/ML — SIGNIFICANT CHANGE UP
CK SERPL-CCNC: 35 U/L — SIGNIFICANT CHANGE UP (ref 30–200)
DIALYSIS INSTRUMENT RESULT - HEPATITIS B SURFACE ANTIGEN: NEGATIVE — SIGNIFICANT CHANGE UP
EOSINOPHIL # BLD AUTO: 0.14 K/UL — SIGNIFICANT CHANGE UP (ref 0–0.5)
EOSINOPHIL NFR BLD AUTO: 2.1 % — SIGNIFICANT CHANGE UP (ref 0–6)
GLUCOSE BLDC GLUCOMTR-MCNC: 105 MG/DL — HIGH (ref 70–99)
GLUCOSE BLDC GLUCOMTR-MCNC: 112 MG/DL — HIGH (ref 70–99)
GLUCOSE BLDC GLUCOMTR-MCNC: 134 MG/DL — HIGH (ref 70–99)
GLUCOSE BLDC GLUCOMTR-MCNC: 98 MG/DL — SIGNIFICANT CHANGE UP (ref 70–99)
HCT VFR BLD CALC: 30.7 % — LOW (ref 39–50)
HGB BLD-MCNC: 9.5 G/DL — LOW (ref 13–17)
IANC: 4.87 K/UL — SIGNIFICANT CHANGE UP (ref 1.5–8.5)
IMM GRANULOCYTES NFR BLD AUTO: 0.8 % — SIGNIFICANT CHANGE UP (ref 0–1.5)
LYMPHOCYTES # BLD AUTO: 0.84 K/UL — LOW (ref 1–3.3)
LYMPHOCYTES # BLD AUTO: 12.7 % — LOW (ref 13–44)
MCHC RBC-ENTMCNC: 29.1 PG — SIGNIFICANT CHANGE UP (ref 27–34)
MCHC RBC-ENTMCNC: 30.9 GM/DL — LOW (ref 32–36)
MCV RBC AUTO: 94.2 FL — SIGNIFICANT CHANGE UP (ref 80–100)
MONOCYTES # BLD AUTO: 0.66 K/UL — SIGNIFICANT CHANGE UP (ref 0–0.9)
MONOCYTES NFR BLD AUTO: 10 % — SIGNIFICANT CHANGE UP (ref 2–14)
NEUTROPHILS # BLD AUTO: 4.87 K/UL — SIGNIFICANT CHANGE UP (ref 1.8–7.4)
NEUTROPHILS NFR BLD AUTO: 73.5 % — SIGNIFICANT CHANGE UP (ref 43–77)
NRBC # BLD: 0 /100 WBCS — SIGNIFICANT CHANGE UP
NRBC # FLD: 0 K/UL — SIGNIFICANT CHANGE UP
PLATELET # BLD AUTO: 162 K/UL — SIGNIFICANT CHANGE UP (ref 150–400)
RBC # BLD: 3.26 M/UL — LOW (ref 4.2–5.8)
RBC # FLD: 16.7 % — HIGH (ref 10.3–14.5)
TROPONIN T, HIGH SENSITIVITY RESULT: 176 NG/L — CRITICAL HIGH
WBC # BLD: 6.62 K/UL — SIGNIFICANT CHANGE UP (ref 3.8–10.5)
WBC # FLD AUTO: 6.62 K/UL — SIGNIFICANT CHANGE UP (ref 3.8–10.5)

## 2021-07-05 RX ORDER — CHLORHEXIDINE GLUCONATE 213 G/1000ML
1 SOLUTION TOPICAL DAILY
Refills: 0 | Status: DISCONTINUED | OUTPATIENT
Start: 2021-07-05 | End: 2021-08-09

## 2021-07-05 RX ORDER — CHLORHEXIDINE GLUCONATE 213 G/1000ML
1 SOLUTION TOPICAL DAILY
Refills: 0 | Status: DISCONTINUED | OUTPATIENT
Start: 2021-07-05 | End: 2021-07-05

## 2021-07-05 RX ADMIN — AMIODARONE HYDROCHLORIDE 200 MILLIGRAM(S): 400 TABLET ORAL at 05:18

## 2021-07-05 RX ADMIN — Medication 25 MILLIGRAM(S): at 20:24

## 2021-07-05 RX ADMIN — Medication 650 MILLIGRAM(S): at 20:36

## 2021-07-05 RX ADMIN — PANTOPRAZOLE SODIUM 40 MILLIGRAM(S): 20 TABLET, DELAYED RELEASE ORAL at 05:17

## 2021-07-05 RX ADMIN — ATORVASTATIN CALCIUM 40 MILLIGRAM(S): 80 TABLET, FILM COATED ORAL at 20:23

## 2021-07-05 RX ADMIN — Medication 81 MILLIGRAM(S): at 09:21

## 2021-07-05 RX ADMIN — PANTOPRAZOLE SODIUM 40 MILLIGRAM(S): 20 TABLET, DELAYED RELEASE ORAL at 20:23

## 2021-07-05 RX ADMIN — CHLORHEXIDINE GLUCONATE 1 APPLICATION(S): 213 SOLUTION TOPICAL at 08:56

## 2021-07-05 RX ADMIN — ERYTHROPOIETIN 20000 UNIT(S): 10000 INJECTION, SOLUTION INTRAVENOUS; SUBCUTANEOUS at 17:43

## 2021-07-05 RX ADMIN — Medication 650 MILLIGRAM(S): at 21:36

## 2021-07-05 NOTE — CONSULT NOTE ADULT - SUBJECTIVE AND OBJECTIVE BOX
Patient is a 70y old  Male who presents with a chief complaint of Chest pain and GIB (05 Jul 2021 12:02)     .     HPI:    HPI:  69 yo M with PMhx of CAD s/p CABG (course at that time complicated by pleural effusion requiring chest tubes), ESRD on HD (MWF), DMII, HTN, afib on coumadin, and anemia presented from [facility] for evaluation of chest pain and GIB. Patient is AAOX2 and was able to provide some information. Most of the information was obtained from charting. Patient was admitted to [facility] after developing chest pain during HD. He was found to have Hgb of 6 and elevated trops concerning for GIB and NSTEM II. He was give 2 units of pRBCs and monitored off warfarin/aspirin. He underwent bowel prep but it did not show any bloody stool. As a result, no colonoscopy was performed. His course was complicated by aflutter/afib with RVR. He was managed with metoprolol and never required cardioversion. On day of transfer, his Hgb was in the 8s. Patient was deemed stable enough to be restarted on aspirin and warfarin. Patient was transferred to [facility] for further evaluation. Currently, patient have no symptoms but reports to have intermittent chest pain, that is suprasternal, non-radiating, exertional at times, and non-reproducible on palpation. it improves with rest and worsens with movement. He denies any fever, chills, cough, orthopnea, PND, LE edema, abdominal pain, diarrhea, or dysuria.    On the floor, vitals are WNL.  (03 Jul 2021 21:11)          REVIEW OF SYSTEMS  Constitutional:   No fever, no fatigue, no pallor, no night sweats, no weight loss.  HEENT:   No eye pain, no vision changes, no icterus, no mouth ulcers.  Respiratory:   No shortness of breath, no cough, no respiratory distress.   Cardiovascular:   No chest pain, no palpitations.   Gastrointestinal: No abdominal pain, no nausea, no vomiting , no diahrrea, no constipation, no hematochezia,no melena.  Skin:   No rashes, no jaundice, no eczema.   Musculoskeletal:   No joint pain, no swelling, no myalgia.   Neurologic:   No headache, no seizure, no weakness.   Genitourinary:   No dysuria, no decreased urine output.  Psychiatric:  No depression, no anxiety,   Endocrine:   No thyroid disease, no diabetes.  Heme/Lymphatic:   No anemia, no blood transfusions, no lymph node enlargement, no bleeding, no bruising.  ___________________________________________________________________________________________  Allergies    lisinopril (Other)  opioid-like analgesics (Other)    Intolerances      MEDICATIONS  (STANDING):  aMIOdarone    Tablet 200 milliGRAM(s) Oral daily  aspirin  chewable 81 milliGRAM(s) Oral daily  atorvastatin 40 milliGRAM(s) Oral at bedtime  chlorhexidine 2% Cloths 1 Application(s) Topical daily  dextrose 40% Gel 15 Gram(s) Oral once  dextrose 5%. 1000 milliLiter(s) (50 mL/Hr) IV Continuous <Continuous>  dextrose 5%. 1000 milliLiter(s) (100 mL/Hr) IV Continuous <Continuous>  dextrose 50% Injectable 25 Gram(s) IV Push once  dextrose 50% Injectable 12.5 Gram(s) IV Push once  dextrose 50% Injectable 25 Gram(s) IV Push once  epoetin danilo-epbx (RETACRIT) Injectable 92519 Unit(s) IV Push <User Schedule>  glucagon  Injectable 1 milliGRAM(s) IntraMuscular once  insulin lispro (ADMELOG) corrective regimen sliding scale   SubCutaneous three times a day before meals  insulin lispro (ADMELOG) corrective regimen sliding scale   SubCutaneous at bedtime  metoprolol tartrate 25 milliGRAM(s) Oral two times a day  pantoprazole  Injectable 40 milliGRAM(s) IV Push every 12 hours    MEDICATIONS  (PRN):  acetaminophen   Tablet .. 650 milliGRAM(s) Oral every 6 hours PRN Temp greater or equal to 38C (100.4F), Mild Pain (1 - 3)      PAST MEDICAL & SURGICAL HISTORY:  HTN (Hypertension)    DM (Diabetes Mellitus)  x 4 yrs without N/N/R    CAD (Coronary Artery Disease)    Hypercholesterolemia    Coronary Stent  CABG in 2019    Heart Attack  2/1/07 with subsequent stent    H/O: CVA  after cardiac stent placed 12/15/09 -no residual    Hyperthyroidism    Stented coronary artery  total 15 stents from 6809-2146    ESRD (end stage renal disease) on dialysis  MWF    Boil of Buttock  2006 and 2008    S/P cataract extraction    Hemodialysis access, AV graft  5/2015, L arm    S/P CABG x 4      FAMILY HISTORY:  Family history of diabetes mellitus (Sibling)    Family history of coronary artery disease      Social History: No hsitory of : Tobacco use, IVDA, EToH  ______________________________________________________________________________________    PHYSICAL EXAM    Daily     Daily   BMI: 30.7 (07-03 @ 19:45)  Change in Weight:  Vital Signs Last 24 Hrs  T(C): 37 (05 Jul 2021 12:57), Max: 37 (05 Jul 2021 12:57)  T(F): 98.6 (05 Jul 2021 12:57), Max: 98.6 (05 Jul 2021 12:57)  HR: 95 (05 Jul 2021 12:57) (89 - 97)  BP: 109/72 (05 Jul 2021 12:57) (87/51 - 109/72)  BP(mean): --  RR: 18 (05 Jul 2021 12:57) (16 - 18)  SpO2: 100% (05 Jul 2021 12:57) (98% - 100%)    General:  Well developed, well nourished, alert and active, no pallor, NAD.  HEENT:    Normal appearance of conjunctiva, ears, nose, lips, oropharynx, and oral mucosa, anicteric.  Neck:  No masses, no asymmetry.  Lymph Nodes:  No lymphadenopathy.   Cardiovascular:  RRR normal S1/S2, no murmur.  Respiratory:  CTA B/L, normal respiratory effort.   Abdominal:   soft, no masses or tenderness, normoactive BS, NT/ND, no HSM.  Extremities:   No clubbing or cyanosis, normal capillary refill, no edema.   Skin:   No rash, jaundice, lesions, eczema.   Musculoskeletal:  No joint swelling, erythema or tenderness.   Neuro: No focal deficits.   Other:   _______________________________________________________________________________________________  Lab Results:                          9.5    6.62  )-----------( 162      ( 05 Jul 2021 09:58 )             30.7     07-04    140  |  98  |  18  ----------------------------<  125<H>  3.4<L>   |  28  |  5.01<H>    Ca    8.9      04 Jul 2021 06:55  Phos  3.5     07-04  Mg     1.80     07-04    TPro  6.1  /  Alb  3.6  /  TBili  0.5  /  DBili  x   /  AST  13  /  ALT  7   /  AlkPhos  71  07-04    LIVER FUNCTIONS - ( 04 Jul 2021 06:55 )  Alb: 3.6 g/dL / Pro: 6.1 g/dL / ALK PHOS: 71 U/L / ALT: 7 U/L / AST: 13 U/L / GGT: x           PT/INR - ( 03 Jul 2021 21:15 )   PT: 16.1 sec;   INR: 1.44 ratio         PTT - ( 03 Jul 2021 21:15 )  PTT:27.7 sec    CARDIAC MARKERS ( 05 Jul 2021 09:58 )  x     / x     / 35 U/L / x     / 3.1 ng/mL  CARDIAC MARKERS ( 04 Jul 2021 18:16 )  x     / x     / x     / x     / 3.2 ng/mL      Stool Results:          RADIOLOGY RESULTS:    SURGICAL PATHOLOGY:

## 2021-07-05 NOTE — PROGRESS NOTE ADULT - SUBJECTIVE AND OBJECTIVE BOX
S: Denies chest pain or SOB. Review of systems otherwise (-)    Review of Systems:   Constitutional: [ ] fevers, [ ] chills.   Skin: [ ] dry skin. [ ] rashes.  Psychiatric: [ ] depression, [ ] anxiety.   Gastrointestinal: [ ] BRBPR, [ ] melena.   Neurological: [ ] confusion. [ ] seizures. [ ] shuffling gait.   Ears,Nose,Mouth and Throat: [ ] ear pain [ ] sore throat.   Eyes: [ ] diplopia.   Respiratory: [ ] hemoptysis. [ ] shortness of breath  Cardiovascular: See HPI above  Hematologic/Lymphatic: [ ] anemia. [ ] painful nodes. [ ] prolonged bleeding.   Genitourinary: [ ] hematuria. [ ] flank pain.   Endocrine: [ ] significant change in weight. [ ] intolerance to heat and cold.     Review of systems [ x] otherwise negative, [ ] otherwise unable to obtain    FH: no family history of sudden cardiac death in first degree relatives    SH: [ ] tobacco, [ ] alcohol, [ ] drugs    acetaminophen   Tablet .. 650 milliGRAM(s) Oral every 6 hours PRN  aMIOdarone    Tablet 200 milliGRAM(s) Oral daily  aspirin  chewable 81 milliGRAM(s) Oral daily  atorvastatin 40 milliGRAM(s) Oral at bedtime  chlorhexidine 2% Cloths 1 Application(s) Topical daily  dextrose 40% Gel 15 Gram(s) Oral once  dextrose 5%. 1000 milliLiter(s) IV Continuous <Continuous>  dextrose 5%. 1000 milliLiter(s) IV Continuous <Continuous>  dextrose 50% Injectable 25 Gram(s) IV Push once  dextrose 50% Injectable 12.5 Gram(s) IV Push once  dextrose 50% Injectable 25 Gram(s) IV Push once  epoetin danilo-epbx (RETACRIT) Injectable 30626 Unit(s) IV Push <User Schedule>  glucagon  Injectable 1 milliGRAM(s) IntraMuscular once  insulin lispro (ADMELOG) corrective regimen sliding scale   SubCutaneous three times a day before meals  insulin lispro (ADMELOG) corrective regimen sliding scale   SubCutaneous at bedtime  metoprolol tartrate 25 milliGRAM(s) Oral two times a day  pantoprazole  Injectable 40 milliGRAM(s) IV Push every 12 hours                            9.5    6.62  )-----------( 162      ( 05 Jul 2021 09:58 )             30.7       07-04    140  |  98  |  18  ----------------------------<  125<H>  3.4<L>   |  28  |  5.01<H>    Ca    8.9      04 Jul 2021 06:55  Phos  3.5     07-04  Mg     1.80     07-04    TPro  6.1  /  Alb  3.6  /  TBili  0.5  /  DBili  x   /  AST  13  /  ALT  7   /  AlkPhos  71  07-04      CARDIAC MARKERS ( 05 Jul 2021 09:58 )  x     / x     / 35 U/L / x     / 3.1 ng/mL  CARDIAC MARKERS ( 04 Jul 2021 18:16 )  x     / x     / x     / x     / 3.2 ng/mL        T(C): 36.4 (07-05-21 @ 05:05), Max: 36.7 (07-04-21 @ 22:05)  HR: 97 (07-05-21 @ 05:05) (89 - 97)  BP: 87/51 (07-05-21 @ 05:05) (87/51 - 95/62)  RR: 17 (07-05-21 @ 05:05) (16 - 17)  SpO2: 100% (07-05-21 @ 05:05) (98% - 100%)  Wt(kg): --    I&O's Summary    04 Jul 2021 07:01  -  05 Jul 2021 07:00  --------------------------------------------------------  IN: 240 mL / OUT: 0 mL / NET: 240 mL        General: Well nourished in no acute distress. Alert and Oriented * 3.   Head: Normocephalic and atraumatic.   Neck: No JVD. No bruits. Supple. Does not appear to be enlarged.   Cardiovascular: + S1,S2 ; RRR Soft systolic murmur at the left lower sternal border. No rubs noted.    Lungs: CTA b/l. No rhonchi, rales or wheezes.   Abdomen: + BS, soft. Non tender. Non distended. No rebound. No guarding.   Extremities: +trace LE edema. No clubbing/cyanosis  Neurologic: Moves all four extremities. Full range of motion.   Skin: Warm and moist. The patient's skin has normal elasticity and good skin turgor.   Psychiatric: Appropriate mood and affect.  Musculoskeletal: Normal range of motion, normal strength    TELEMETRY: AF 90s	      ECG: AFL, RBBB  	    RADIOLOGY:         CXR: Results Pending    ASSESSMENT/PLAN: Patient is a 71 y/o male well known to our office with PMH of CAD s/p multiple stents, s/p CABG (9/2019 course c/b Afib and LT pleural effusion), HTN, HLD, DM2, Afib on coumadin, ESRD on HD, and hyperthyroidism who presented to [facility] for chest pain and poss GIB, severe anemia Hgb of 6 requiring PRBCs found to have elevated troponin. Patient transferred to [facility] for further evaluation. Cardiology consulted for further evaluation.    - No active chest pain currently  - Monitor H/H  - Elevated troponin noted in setting of anemia and renal disease with negative CK/CKMB - do not suspect ACS  - COVID negative  - Follow up CXR  - Continue medical management of CAD with ASA/Statin if okay with GI  - Continue amio and metoprolol with hold parameters, asymptomatic hypotension noted  - AC with coumadin on hold until cleared by GI  - GI eval with Dr. Roe called  - Fluid removal with HD per renal  - Check TTE to eval LV function and r/o new wall motion abnormalities  - Further recs pending above    Wilfred Robin PA-C  Pager: [phone]

## 2021-07-05 NOTE — PROGRESS NOTE ADULT - SUBJECTIVE AND OBJECTIVE BOX
[facility] - S Eugene / Marlena S /D Cruz/ WILY Mackey/ WILY Macias/ Alexandre Esposito / WILMER Hermosillou/ O Jessi  service -[phone], office [phone]  ---------------------------------------------------------------------------------------------------------------    Patient seen and examined bedside    Subjective and Objective: No overnight events, sob resolved. No complaints today. feeling better    Allergies: lisinopril (Other)  opioid-like analgesics (Other)      Hospital Medications:   MEDICATIONS  (STANDING):  aMIOdarone    Tablet 200 milliGRAM(s) Oral daily  aspirin  chewable 81 milliGRAM(s) Oral daily  atorvastatin 40 milliGRAM(s) Oral at bedtime  chlorhexidine 2% Cloths 1 Application(s) Topical daily  dextrose 40% Gel 15 Gram(s) Oral once  dextrose 5%. 1000 milliLiter(s) (50 mL/Hr) IV Continuous <Continuous>  dextrose 5%. 1000 milliLiter(s) (100 mL/Hr) IV Continuous <Continuous>  dextrose 50% Injectable 25 Gram(s) IV Push once  dextrose 50% Injectable 12.5 Gram(s) IV Push once  dextrose 50% Injectable 25 Gram(s) IV Push once  epoetin danilo-epbx (RETACRIT) Injectable 82210 Unit(s) IV Push <User Schedule>  glucagon  Injectable 1 milliGRAM(s) IntraMuscular once  insulin lispro (ADMELOG) corrective regimen sliding scale   SubCutaneous three times a day before meals  insulin lispro (ADMELOG) corrective regimen sliding scale   SubCutaneous at bedtime  metoprolol tartrate 25 milliGRAM(s) Oral two times a day  pantoprazole  Injectable 40 milliGRAM(s) IV Push every 12 hours      REVIEW OF SYSTEMS:  CONSTITUTIONAL: No weakness, fevers or chills  EYES/ENT: No visual changes;  No vertigo or throat pain   NECK: No pain or stiffness  RESPIRATORY: No cough, wheezing, hemoptysis; No shortness of breath  CARDIOVASCULAR: No chest pain or palpitations.  GASTROINTESTINAL: No abdominal or epigastric pain. No nausea, vomiting, or hematemesis; No diarrhea or constipation. No melena or hematochezia.  GENITOURINARY: No dysuria, frequency, foamy urine, urinary urgency, incontinence or hematuria  NEUROLOGICAL: No numbness or weakness  SKIN: No itching, burning, rashes, or lesions   VASCULAR: No bilateral lower extremity edema.   All other review of systems is negative unless indicated above.    VITALS:  T(F): 98.6 (07-05-21 @ 12:57), Max: 98.6 (07-05-21 @ 12:57)  HR: 95 (07-05-21 @ 12:57)  BP: 109/72 (07-05-21 @ 12:57)  RR: 18 (07-05-21 @ 12:57)  SpO2: 100% (07-05-21 @ 12:57)  Wt(kg): --    07-04 @ 07:01  -  07-05 @ 07:00  --------------------------------------------------------  IN: 240 mL / OUT: 0 mL / NET: 240 mL          PHYSICAL EXAM:  Constitutional: NAD  HEENT: anicteric sclera, oropharynx clear  Neck: No JVD  Respiratory: CTAB, no wheezes, rales or rhonchi  Cardiovascular: S1, S2, RRR  Gastrointestinal: BS+, soft, NT/ND  Extremities: No cyanosis or clubbing. No peripheral edema  Neurological: A/O x 3, no focal deficits  Psychiatric: Normal mood, normal affect  : No CVA tenderness. No wagner.   Skin: No rashes  Vascular Access:    LABS:  07-04    140  |  98  |  18  ----------------------------<  125<H>  3.4<L>   |  28  |  5.01<H>    Ca    8.9      04 Jul 2021 06:55  Phos  3.5     07-04  Mg     1.80     07-04    TPro  6.1  /  Alb  3.6  /  TBili  0.5  /  DBili      /  AST  13  /  ALT  7   /  AlkPhos  71  07-04    Creatinine Trend: 5.01 <--, 4.25 <--                        9.5    6.62  )-----------( 162      ( 05 Jul 2021 09:58 )             30.7     Urine Studies:        RADIOLOGY & ADDITIONAL STUDIES:   [facility] - S Eugene / Marlena S /D Cruz/ S Narendra/ S Colleen/ Alexandre Esposito / WILMER Hermosillou/ O Jessi  service -[phone], office [phone]  ---------------------------------------------------------------------------------------------------------------    Patient seen and examined bedside    Subjective and Objective: No overnight events, denied sob. No complaints today.     Allergies: lisinopril (Other)  opioid-like analgesics (Other)      Hospital Medications:   MEDICATIONS  (STANDING):  aMIOdarone    Tablet 200 milliGRAM(s) Oral daily  aspirin  chewable 81 milliGRAM(s) Oral daily  atorvastatin 40 milliGRAM(s) Oral at bedtime  chlorhexidine 2% Cloths 1 Application(s) Topical daily  dextrose 40% Gel 15 Gram(s) Oral once  dextrose 5%. 1000 milliLiter(s) (50 mL/Hr) IV Continuous <Continuous>  dextrose 5%. 1000 milliLiter(s) (100 mL/Hr) IV Continuous <Continuous>  dextrose 50% Injectable 25 Gram(s) IV Push once  dextrose 50% Injectable 12.5 Gram(s) IV Push once  dextrose 50% Injectable 25 Gram(s) IV Push once  epoetin danilo-epbx (RETACRIT) Injectable 64221 Unit(s) IV Push <User Schedule>  glucagon  Injectable 1 milliGRAM(s) IntraMuscular once  insulin lispro (ADMELOG) corrective regimen sliding scale   SubCutaneous three times a day before meals  insulin lispro (ADMELOG) corrective regimen sliding scale   SubCutaneous at bedtime  metoprolol tartrate 25 milliGRAM(s) Oral two times a day  pantoprazole  Injectable 40 milliGRAM(s) IV Push every 12 hours    VITALS:  T(F): 98.6 (07-05-21 @ 12:57), Max: 98.6 (07-05-21 @ 12:57)  HR: 95 (07-05-21 @ 12:57)  BP: 109/72 (07-05-21 @ 12:57)  RR: 18 (07-05-21 @ 12:57)  SpO2: 100% (07-05-21 @ 12:57)  Wt(kg): --    07-04 @ 07:01  -  07-05 @ 07:00  --------------------------------------------------------  IN: 240 mL / OUT: 0 mL / NET: 240 mL      PHYSICAL EXAM:  Constitutional: NAD  HEENT: anicteric sclera  Neck: No JVD  Respiratory: CTAB, no wheezes, rales or rhonchi  Cardiovascular: S1, S2, RRR  Gastrointestinal: BS+, soft, NT  Extremities: no peripheral edema b/l   Neurological: A/O x 3  Psychiatric: Normal mood, normal affect  : no wagner.   Vascular Access: AVF+thrill     LABS:  07-04    140  |  98  |  18  ----------------------------<  125<H>  3.4<L>   |  28  |  5.01<H>    Ca    8.9      04 Jul 2021 06:55  Phos  3.5     07-04  Mg     1.80     07-04    TPro  6.1  /  Alb  3.6  /  TBili  0.5  /  DBili      /  AST  13  /  ALT  7   /  AlkPhos  71  07-04    Creatinine Trend: 5.01 <--, 4.25 <--                        9.5    6.62  )-----------( 162      ( 05 Jul 2021 09:58 )             30.7     Urine Studies:        RADIOLOGY & ADDITIONAL STUDIES:

## 2021-07-05 NOTE — CONSULT NOTE ADULT - ASSESSMENT
70 year old male with possible GI bleed       GI bleed   Monitor CBC   Keep hemoglobin > 8 given cardiac issues   PPI BID   Plan for colonoscopy/ EGD possibly Wednesday after cardiac optimization     CAD/  Hold Coumadin   ASA ok       I was physically present for the key portions of the evaluation and management (E/M) service provided.  The patient was personally seen and examined at bedside.  I have edited the note as appropriate.   Thank you for your consultation and allowing  me to participate in the care of your patients. If you have further questions please contact me at [phone].     Randolph Winters M.D.       _________________________________________________________________________________________________  [facility]  [address] [place] [address]  Office: [phone]    Fortunato Rose PA-C    ___________________________________________________________________________________________________

## 2021-07-05 NOTE — PROGRESS NOTE ADULT - ASSESSMENT
71 yo M with PMhx of CAD s/p CABG (course at that time complicated by pleural effusion requiring chest tubes), ESRD on HD (MWF), DMII, HTN, afib on coumadin, and anemia presented from [facility] for evaluation of chest pain and GIB. Renal following for ESRD Mx.    ESRD  Continue MWF schedule  Renal diet  scheduled for hd today w/2k bath, uf 1kg as tolerated  dose all meds for ESRD  HTN, controlled.     Anemia in CKD+GIB- Hb <goal  c/w Epo 20k tiw with HD  monitor h/h    Renal osteodystrophy  Phos at goal      labs, chart reviewed    For any question, call:  Cell # [phone]  office # [phone]  Pager # [phone]

## 2021-07-06 LAB
ALBUMIN SERPL ELPH-MCNC: 3.7 G/DL — SIGNIFICANT CHANGE UP (ref 3.3–5)
ALP SERPL-CCNC: 72 U/L — SIGNIFICANT CHANGE UP (ref 40–120)
ALT FLD-CCNC: 7 U/L — SIGNIFICANT CHANGE UP (ref 4–41)
ANION GAP SERPL CALC-SCNC: 14 MMOL/L — SIGNIFICANT CHANGE UP (ref 7–14)
AST SERPL-CCNC: 13 U/L — SIGNIFICANT CHANGE UP (ref 4–40)
BASOPHILS # BLD AUTO: 0.03 K/UL — SIGNIFICANT CHANGE UP (ref 0–0.2)
BASOPHILS NFR BLD AUTO: 0.4 % — SIGNIFICANT CHANGE UP (ref 0–2)
BILIRUB SERPL-MCNC: 0.8 MG/DL — SIGNIFICANT CHANGE UP (ref 0.2–1.2)
BLD GP AB SCN SERPL QL: NEGATIVE — SIGNIFICANT CHANGE UP
BUN SERPL-MCNC: 13 MG/DL — SIGNIFICANT CHANGE UP (ref 7–23)
CALCIUM SERPL-MCNC: 9.1 MG/DL — SIGNIFICANT CHANGE UP (ref 8.4–10.5)
CHLORIDE SERPL-SCNC: 95 MMOL/L — LOW (ref 98–107)
CO2 SERPL-SCNC: 27 MMOL/L — SIGNIFICANT CHANGE UP (ref 22–31)
CREAT SERPL-MCNC: 5.12 MG/DL — HIGH (ref 0.5–1.3)
EOSINOPHIL # BLD AUTO: 0.05 K/UL — SIGNIFICANT CHANGE UP (ref 0–0.5)
EOSINOPHIL NFR BLD AUTO: 0.6 % — SIGNIFICANT CHANGE UP (ref 0–6)
GLUCOSE BLDC GLUCOMTR-MCNC: 111 MG/DL — HIGH (ref 70–99)
GLUCOSE BLDC GLUCOMTR-MCNC: 133 MG/DL — HIGH (ref 70–99)
GLUCOSE BLDC GLUCOMTR-MCNC: 147 MG/DL — HIGH (ref 70–99)
GLUCOSE BLDC GLUCOMTR-MCNC: 157 MG/DL — HIGH (ref 70–99)
GLUCOSE SERPL-MCNC: 102 MG/DL — HIGH (ref 70–99)
HBV CORE AB SER-ACNC: SIGNIFICANT CHANGE UP
HBV SURFACE AB SER-ACNC: 50.8 MIU/ML — SIGNIFICANT CHANGE UP
HBV SURFACE AG SER-ACNC: SIGNIFICANT CHANGE UP
HCT VFR BLD CALC: 30.1 % — LOW (ref 39–50)
HCV AB S/CO SERPL IA: 0.12 S/CO — SIGNIFICANT CHANGE UP (ref 0–0.99)
HCV AB SERPL-IMP: SIGNIFICANT CHANGE UP
HGB BLD-MCNC: 9.3 G/DL — LOW (ref 13–17)
IANC: 6.72 K/UL — SIGNIFICANT CHANGE UP (ref 1.5–8.5)
IMM GRANULOCYTES NFR BLD AUTO: 0.6 % — SIGNIFICANT CHANGE UP (ref 0–1.5)
LYMPHOCYTES # BLD AUTO: 0.28 K/UL — LOW (ref 1–3.3)
LYMPHOCYTES # BLD AUTO: 3.6 % — LOW (ref 13–44)
MAGNESIUM SERPL-MCNC: 1.7 MG/DL — SIGNIFICANT CHANGE UP (ref 1.6–2.6)
MCHC RBC-ENTMCNC: 28.9 PG — SIGNIFICANT CHANGE UP (ref 27–34)
MCHC RBC-ENTMCNC: 30.9 GM/DL — LOW (ref 32–36)
MCV RBC AUTO: 93.5 FL — SIGNIFICANT CHANGE UP (ref 80–100)
MONOCYTES # BLD AUTO: 0.69 K/UL — SIGNIFICANT CHANGE UP (ref 0–0.9)
MONOCYTES NFR BLD AUTO: 8.8 % — SIGNIFICANT CHANGE UP (ref 2–14)
MRSA PCR RESULT.: SIGNIFICANT CHANGE UP
NEUTROPHILS # BLD AUTO: 6.72 K/UL — SIGNIFICANT CHANGE UP (ref 1.8–7.4)
NEUTROPHILS NFR BLD AUTO: 86 % — HIGH (ref 43–77)
NRBC # BLD: 0 /100 WBCS — SIGNIFICANT CHANGE UP
NRBC # FLD: 0 K/UL — SIGNIFICANT CHANGE UP
PHOSPHATE SERPL-MCNC: 3.4 MG/DL — SIGNIFICANT CHANGE UP (ref 2.5–4.5)
PLATELET # BLD AUTO: 136 K/UL — LOW (ref 150–400)
POTASSIUM SERPL-MCNC: 3.7 MMOL/L — SIGNIFICANT CHANGE UP (ref 3.5–5.3)
POTASSIUM SERPL-SCNC: 3.7 MMOL/L — SIGNIFICANT CHANGE UP (ref 3.5–5.3)
PROT SERPL-MCNC: 6.2 G/DL — SIGNIFICANT CHANGE UP (ref 6–8.3)
RBC # BLD: 3.22 M/UL — LOW (ref 4.2–5.8)
RBC # FLD: 16.7 % — HIGH (ref 10.3–14.5)
RH IG SCN BLD-IMP: POSITIVE — SIGNIFICANT CHANGE UP
S AUREUS DNA NOSE QL NAA+PROBE: DETECTED
SODIUM SERPL-SCNC: 136 MMOL/L — SIGNIFICANT CHANGE UP (ref 135–145)
WBC # BLD: 7.82 K/UL — SIGNIFICANT CHANGE UP (ref 3.8–10.5)
WBC # FLD AUTO: 7.82 K/UL — SIGNIFICANT CHANGE UP (ref 3.8–10.5)

## 2021-07-06 PROCEDURE — 71250 CT THORAX DX C-: CPT | Mod: 26

## 2021-07-06 RX ADMIN — PANTOPRAZOLE SODIUM 40 MILLIGRAM(S): 20 TABLET, DELAYED RELEASE ORAL at 05:00

## 2021-07-06 RX ADMIN — ATORVASTATIN CALCIUM 40 MILLIGRAM(S): 80 TABLET, FILM COATED ORAL at 22:31

## 2021-07-06 RX ADMIN — Medication 0: at 08:44

## 2021-07-06 RX ADMIN — Medication 25 MILLIGRAM(S): at 05:00

## 2021-07-06 RX ADMIN — PANTOPRAZOLE SODIUM 40 MILLIGRAM(S): 20 TABLET, DELAYED RELEASE ORAL at 17:46

## 2021-07-06 RX ADMIN — Medication 25 MILLIGRAM(S): at 17:46

## 2021-07-06 RX ADMIN — Medication 81 MILLIGRAM(S): at 11:30

## 2021-07-06 RX ADMIN — AMIODARONE HYDROCHLORIDE 200 MILLIGRAM(S): 400 TABLET ORAL at 05:00

## 2021-07-06 RX ADMIN — Medication 0: at 12:53

## 2021-07-06 RX ADMIN — Medication 0: at 18:16

## 2021-07-06 RX ADMIN — CHLORHEXIDINE GLUCONATE 1 APPLICATION(S): 213 SOLUTION TOPICAL at 11:30

## 2021-07-06 NOTE — PROGRESS NOTE ADULT - SUBJECTIVE AND OBJECTIVE BOX
S: Denies chest pain or SOB. Review of systems otherwise (-)    chief complaint:    extended hpi: with > 4 characteristics:    Review of Systems:   Constitutional: [ ] fevers, [ ] chills.   Skin: [ ] dry skin. [ ] rashes.  Psychiatric: [ ] depression, [ ] anxiety.   Gastrointestinal: [ ] BRBPR, [ ] melena.   Neurological: [ ] confusion. [ ] seizures. [ ] shuffling gait.   Ears,Nose,Mouth and Throat: [ ] ear pain [ ] sore throat.   Eyes: [ ] diplopia.   Respiratory: [ ] hemoptysis. [ ] shortness of breath  Cardiovascular: See HPI above  Hematologic/Lymphatic: [ ] anemia. [ ] painful nodes. [ ] prolonged bleeding.   Genitourinary: [ ] hematuria. [ ] flank pain.   Endocrine: [ ] significant change in weight. [ ] intolerance to heat and cold.     Review of systems [ ] otherwise negative, [ ] otherwise unable to obtain    FH: no family history of sudden cardiac death in first degree relatives    SH: [ ] tobacco, [ ] alcohol, [ ] drugs    acetaminophen   Tablet .. 650 milliGRAM(s) Oral every 6 hours PRN  aMIOdarone    Tablet 200 milliGRAM(s) Oral daily  aspirin  chewable 81 milliGRAM(s) Oral daily  atorvastatin 40 milliGRAM(s) Oral at bedtime  chlorhexidine 2% Cloths 1 Application(s) Topical daily  dextrose 40% Gel 15 Gram(s) Oral once  dextrose 5%. 1000 milliLiter(s) IV Continuous <Continuous>  dextrose 5%. 1000 milliLiter(s) IV Continuous <Continuous>  dextrose 50% Injectable 25 Gram(s) IV Push once  dextrose 50% Injectable 12.5 Gram(s) IV Push once  dextrose 50% Injectable 25 Gram(s) IV Push once  epoetin danilo-epbx (RETACRIT) Injectable 96327 Unit(s) IV Push <User Schedule>  glucagon  Injectable 1 milliGRAM(s) IntraMuscular once  insulin lispro (ADMELOG) corrective regimen sliding scale   SubCutaneous three times a day before meals  insulin lispro (ADMELOG) corrective regimen sliding scale   SubCutaneous at bedtime  metoprolol tartrate 25 milliGRAM(s) Oral two times a day  pantoprazole  Injectable 40 milliGRAM(s) IV Push every 12 hours                            9.3    7.82  )-----------( 136      ( 06 Jul 2021 04:45 )             30.1       07-06    136  |  95<L>  |  13  ----------------------------<  102<H>  3.7   |  27  |  5.12<H>    Ca    9.1      06 Jul 2021 04:45  Phos  3.4     07-06  Mg     1.70     07-06    TPro  6.2  /  Alb  3.7  /  TBili  0.8  /  DBili  x   /  AST  13  /  ALT  7   /  AlkPhos  72  07-06      CARDIAC MARKERS ( 05 Jul 2021 09:58 )  x     / x     / 35 U/L / x     / 3.1 ng/mL  CARDIAC MARKERS ( 04 Jul 2021 18:16 )  x     / x     / x     / x     / 3.2 ng/mL        T(C): 36.5 (07-06-21 @ 04:57), Max: 37 (07-05-21 @ 12:57)  HR: 87 (07-06-21 @ 04:57) (87 - 96)  BP: 118/70 (07-06-21 @ 04:57) (109/72 - 132/70)  RR: 17 (07-06-21 @ 04:57) (17 - 18)  SpO2: 100% (07-06-21 @ 04:57) (98% - 100%)      General: Well nourished in no acute distress. Alert and Oriented * 3.   Head: Normocephalic and atraumatic.   Neck: No JVD. No bruits. Supple. Does not appear to be enlarged.   Cardiovascular: + S1,S2 ; RRR Soft systolic murmur at the left lower sternal border. No rubs noted.    Lungs: CTA b/l. No rhonchi, rales or wheezes.   Abdomen: + BS, soft. Non tender. Non distended. No rebound. No guarding.   Extremities: +trace LE edema. No clubbing/cyanosis  Neurologic: Moves all four extremities. Full range of motion.   Skin: Warm and moist. The patient's skin has normal elasticity and good skin turgor.   Psychiatric: Appropriate mood and affect.  Musculoskeletal: Normal range of motion, normal strength    TELEMETRY: AF 90s	      ECG: AFL, RBBB  	    < from: Xray Chest 1 View- PORTABLE-Routine (Xray Chest 1 View- PORTABLE-Routine .) (07.04.21 @ 10:13) >  FINDINGS/  IMPRESSION:    Status post sternotomy. Stent graft projectingover the left subclavian region    Large left pleural effusion and underlying atelectasis. Pulmonary edema.    < end of copied text >      ASSESSMENT/PLAN: Patient is a 69 y/o male well known to our office with PMH of CAD s/p multiple stents, s/p CABG (9/2019 course c/b Afib and LT pleural effusion), HTN, HLD, DM2, Afib on coumadin, ESRD on HD, and hyperthyroidism who presented to [facility] for chest pain and poss GIB, severe anemia Hgb of 6 requiring PRBCs found to have elevated troponin. Patient transferred to [facility] for further evaluation. Cardiology consulted for further evaluation.    - No active chest pain currently  - Monitor H/H  - Elevated troponin noted in setting of anemia and renal disease with negative CK/CKMB - do not suspect ACS  - COVID negative  - Continue medical management of CAD with ASA/Statin if okay with GI  - Continue amio and metoprolol with hold parameters  - AC with coumadin on hold until cleared by GI  - GI eval with Dr. Roe   - Fluid removal with HD per renal  - Check TTE to eval LV function and r/o new wall motion abnormalities  -CXR report noted, check CT Chest w/o con to eval effusion

## 2021-07-06 NOTE — PROGRESS NOTE ADULT - SUBJECTIVE AND OBJECTIVE BOX
Nephrology Followup Note - [phone] - Dr Mendiola / Dr Knight / Dr Macias / Dr Arroyo / Dr Mackey / Dr Bowen / Dr Esposito / Dr Calle  Pt seen and examined at bedside  No acute events overnight. No complaints.     Allergies:  lisinopril (Other)  opioid-like analgesics (Other)    Hospital Medications:   MEDICATIONS  (STANDING):  aMIOdarone    Tablet 200 milliGRAM(s) Oral daily  aspirin  chewable 81 milliGRAM(s) Oral daily  atorvastatin 40 milliGRAM(s) Oral at bedtime  chlorhexidine 2% Cloths 1 Application(s) Topical daily  dextrose 40% Gel 15 Gram(s) Oral once  dextrose 5%. 1000 milliLiter(s) (50 mL/Hr) IV Continuous <Continuous>  dextrose 5%. 1000 milliLiter(s) (100 mL/Hr) IV Continuous <Continuous>  dextrose 50% Injectable 25 Gram(s) IV Push once  dextrose 50% Injectable 12.5 Gram(s) IV Push once  dextrose 50% Injectable 25 Gram(s) IV Push once  epoetin danilo-epbx (RETACRIT) Injectable 47539 Unit(s) IV Push <User Schedule>  glucagon  Injectable 1 milliGRAM(s) IntraMuscular once  insulin lispro (ADMELOG) corrective regimen sliding scale   SubCutaneous three times a day before meals  insulin lispro (ADMELOG) corrective regimen sliding scale   SubCutaneous at bedtime  metoprolol tartrate 25 milliGRAM(s) Oral two times a day  pantoprazole  Injectable 40 milliGRAM(s) IV Push every 12 hours    VITALS:  T(F): 97.7 (07-06-21 @ 04:57), Max: 98.6 (07-05-21 @ 12:57)  HR: 87 (07-06-21 @ 04:57)  BP: 118/70 (07-06-21 @ 04:57)  RR: 17 (07-06-21 @ 04:57)  SpO2: 100% (07-06-21 @ 04:57)  Wt(kg): --    07-05 @ 07:01  -  07-06 @ 07:00  --------------------------------------------------------  IN: 760 mL / OUT: 1400 mL / NET: -640 mL        PHYSICAL EXAM:  Constitutional: NAD  HEENT: anicteric sclera, oropharynx clear, MMM  Neck: No JVD  Respiratory: CTAB, no wheezes, rales or rhonchi  Cardiovascular: S1, S2, RRR  Gastrointestinal: BS+, soft, NT/ND  Extremities: No cyanosis or clubbing. No peripheral edema  Neurological: A/O x 3, no focal deficits  Psychiatric: Normal mood, normal affect  : No CVA tenderness. No wagner.   Skin: No rashes  Vascular Access: LUE AV access +thrill and bruit.     LABS:  07-06    136  |  95<L>  |  13  ----------------------------<  102<H>  3.7   |  27  |  5.12<H>    Ca    9.1      06 Jul 2021 04:45  Phos  3.4     07-06  Mg     1.70     07-06    TPro  6.2  /  Alb  3.7  /  TBili  0.8  /  DBili      /  AST  13  /  ALT  7   /  AlkPhos  72  07-06    Creatinine Trend: 5.12 <--, 5.01 <--, 4.25 <--                        9.3    7.82  )-----------( 136      ( 06 Jul 2021 04:45 )             30.1     Urine Studies:      RADIOLOGY & ADDITIONAL STUDIES:

## 2021-07-06 NOTE — PROGRESS NOTE ADULT - ATTENDING COMMENTS
Patient seen and examined.  Agree with above.   Check TTE to assess for RWMA  CT chest to assess pleural effusion   Further workup pending above    Kalie Lau MD

## 2021-07-06 NOTE — PROGRESS NOTE ADULT - SUBJECTIVE AND OBJECTIVE BOX
Patient is a 70y old  Male who presents with a chief complaint of Chest pain and GIB (05 Jul 2021 12:02)     .     HPI:    Subjective:    Events noted   ___________________________________________________________________________________________  Allergies    lisinopril (Other)  opioid-like analgesics (Other)    Intolerances      MEDICATIONS  (STANDING):  aMIOdarone    Tablet 200 milliGRAM(s) Oral daily  aspirin  chewable 81 milliGRAM(s) Oral daily  atorvastatin 40 milliGRAM(s) Oral at bedtime  chlorhexidine 2% Cloths 1 Application(s) Topical daily  dextrose 40% Gel 15 Gram(s) Oral once  dextrose 5%. 1000 milliLiter(s) (50 mL/Hr) IV Continuous <Continuous>  dextrose 5%. 1000 milliLiter(s) (100 mL/Hr) IV Continuous <Continuous>  dextrose 50% Injectable 25 Gram(s) IV Push once  dextrose 50% Injectable 12.5 Gram(s) IV Push once  dextrose 50% Injectable 25 Gram(s) IV Push once  epoetin danilo-epbx (RETACRIT) Injectable 64574 Unit(s) IV Push <User Schedule>  glucagon  Injectable 1 milliGRAM(s) IntraMuscular once  insulin lispro (ADMELOG) corrective regimen sliding scale   SubCutaneous three times a day before meals  insulin lispro (ADMELOG) corrective regimen sliding scale   SubCutaneous at bedtime  metoprolol tartrate 25 milliGRAM(s) Oral two times a day  pantoprazole  Injectable 40 milliGRAM(s) IV Push every 12 hours    MEDICATIONS  (PRN):  acetaminophen   Tablet .. 650 milliGRAM(s) Oral every 6 hours PRN Temp greater or equal to 38C (100.4F), Mild Pain (1 - 3)      PAST MEDICAL & SURGICAL HISTORY:  HTN (Hypertension)    DM (Diabetes Mellitus)  x 4 yrs without N/N/R    CAD (Coronary Artery Disease)    Hypercholesterolemia    Coronary Stent  CABG in 2019    Heart Attack  2/1/07 with subsequent stent    H/O: CVA  after cardiac stent placed 12/15/09 -no residual    Hyperthyroidism    Stented coronary artery  total 15 stents from 5981-3575    ESRD (end stage renal disease) on dialysis  MWF    Boil of Buttock  2006 and 2008    S/P cataract extraction    Hemodialysis access, AV graft  5/2015, L arm    S/P CABG x 4      FAMILY HISTORY:  Family history of diabetes mellitus (Sibling)    Family history of coronary artery disease      Social History: No hsitory of : Tobacco use, IVDA, EToH  ______________________________________________________________________________________    PHYSICAL EXAM    Daily     Daily   BMI: 30.7 (07-03 @ 19:45)  Change in Weight:  Vital Signs Last 24 Hrs  T(C): 37 (05 Jul 2021 12:57), Max: 37 (05 Jul 2021 12:57)  T(F): 98.6 (05 Jul 2021 12:57), Max: 98.6 (05 Jul 2021 12:57)  HR: 95 (05 Jul 2021 12:57) (89 - 97)  BP: 109/72 (05 Jul 2021 12:57) (87/51 - 109/72)  BP(mean): --  RR: 18 (05 Jul 2021 12:57) (16 - 18)  SpO2: 100% (05 Jul 2021 12:57) (98% - 100%)    General:  Well developed, well nourished, alert and active, no pallor, NAD.  HEENT:    Normal appearance of conjunctiva, ears, nose, lips, oropharynx, and oral mucosa, anicteric.  Neck:  No masses, no asymmetry.  Lymph Nodes:  No lymphadenopathy.   Cardiovascular:  RRR normal S1/S2, no murmur.  Respiratory:  CTA B/L, normal respiratory effort.   Abdominal:   soft, no masses or tenderness, normoactive BS, NT/ND, no HSM.  Extremities:   No clubbing or cyanosis, normal capillary refill, no edema.   Skin:   No rash, jaundice, lesions, eczema.   Musculoskeletal:  No joint swelling, erythema or tenderness.   Neuro: No focal deficits.   Other:   _______________________________________________________________________________________________  Lab Results:                          9.5    6.62  )-----------( 162      ( 05 Jul 2021 09:58 )             30.7     07-04    140  |  98  |  18  ----------------------------<  125<H>  3.4<L>   |  28  |  5.01<H>    Ca    8.9      04 Jul 2021 06:55  Phos  3.5     07-04  Mg     1.80     07-04    TPro  6.1  /  Alb  3.6  /  TBili  0.5  /  DBili  x   /  AST  13  /  ALT  7   /  AlkPhos  71  07-04    LIVER FUNCTIONS - ( 04 Jul 2021 06:55 )  Alb: 3.6 g/dL / Pro: 6.1 g/dL / ALK PHOS: 71 U/L / ALT: 7 U/L / AST: 13 U/L / GGT: x           PT/INR - ( 03 Jul 2021 21:15 )   PT: 16.1 sec;   INR: 1.44 ratio         PTT - ( 03 Jul 2021 21:15 )  PTT:27.7 sec    CARDIAC MARKERS ( 05 Jul 2021 09:58 )  x     / x     / 35 U/L / x     / 3.1 ng/mL  CARDIAC MARKERS ( 04 Jul 2021 18:16 )  x     / x     / x     / x     / 3.2 ng/mL      Stool Results:          RADIOLOGY RESULTS:    SURGICAL PATHOLOGY:

## 2021-07-06 NOTE — PROGRESS NOTE ADULT - ASSESSMENT
70 year old male with possible GI bleed       GI bleed   Monitor CBC   Keep hemoglobin > 8 given cardiac issues   PPI BID   Plan for colonoscopy/ EGD possibly after cardiac optimization     CAD  Hold Coumadin   ASA ok       I was physically present for the key portions of the evaluation and management (E/M) service provided.  The patient was personally seen and examined at bedside.  I have edited the note as appropriate.   Thank you for your consultation and allowing  me to participate in the care of your patients. If you have further questions please contact me at [phone].     Randolph Winters M.D.       _________________________________________________________________________________________________  [facility]  [address] [place] [address]  Office: [phone]    Fortunato Rose PA-C    ___________________________________________________________________________________________________

## 2021-07-06 NOTE — PROGRESS NOTE ADULT - ASSESSMENT
69 yo M with PMhx of CAD s/p CABG (course at that time complicated by pleural effusion requiring chest tubes), ESRD on HD (MWF), DMII, HTN, afib on coumadin, and anemia presented from [facility] for evaluation of chest pain and GIB. Renal following for ESRD Mx.    ESRD  Continue MWF schedule  Pt tolerated HD yesterday, without acute event events. FLowsheet reviewed.   Repeat HD tomorrow.   Renal diet  dose all meds for ESRD  HTN, controlled.     Anemia in CKD+GIB- Hb <goal  c/w Epo 20k tiw with HD  monitor h/h  GI following, planning for cscope.     Renal osteodystrophy  Phos at goal    Rudy Mendiola MD  [facility]  Office [phone]  Ans Serv [phone]  Cell - [phone]

## 2021-07-07 LAB
ALBUMIN SERPL ELPH-MCNC: 3.6 G/DL — SIGNIFICANT CHANGE UP (ref 3.3–5)
ALBUMIN SERPL ELPH-MCNC: 4.1 G/DL — SIGNIFICANT CHANGE UP (ref 3.3–5)
ALP SERPL-CCNC: 73 U/L — SIGNIFICANT CHANGE UP (ref 40–120)
ALP SERPL-CCNC: 83 U/L — SIGNIFICANT CHANGE UP (ref 40–120)
ALT FLD-CCNC: 7 U/L — SIGNIFICANT CHANGE UP (ref 4–41)
ALT FLD-CCNC: 9 U/L — SIGNIFICANT CHANGE UP (ref 4–41)
ANION GAP SERPL CALC-SCNC: 17 MMOL/L — HIGH (ref 7–14)
ANION GAP SERPL CALC-SCNC: 18 MMOL/L — HIGH (ref 7–14)
APTT BLD: 29.4 SEC — SIGNIFICANT CHANGE UP (ref 27–36.3)
APTT BLD: 65.5 SEC — HIGH (ref 27–36.3)
AST SERPL-CCNC: 11 U/L — SIGNIFICANT CHANGE UP (ref 4–40)
AST SERPL-CCNC: 16 U/L — SIGNIFICANT CHANGE UP (ref 4–40)
BASOPHILS # BLD AUTO: 0.04 K/UL — SIGNIFICANT CHANGE UP (ref 0–0.2)
BASOPHILS NFR BLD AUTO: 0.6 % — SIGNIFICANT CHANGE UP (ref 0–2)
BILIRUB SERPL-MCNC: 0.9 MG/DL — SIGNIFICANT CHANGE UP (ref 0.2–1.2)
BILIRUB SERPL-MCNC: 1 MG/DL — SIGNIFICANT CHANGE UP (ref 0.2–1.2)
BUN SERPL-MCNC: 10 MG/DL — SIGNIFICANT CHANGE UP (ref 7–23)
BUN SERPL-MCNC: 21 MG/DL — SIGNIFICANT CHANGE UP (ref 7–23)
CALCIUM SERPL-MCNC: 8.9 MG/DL — SIGNIFICANT CHANGE UP (ref 8.4–10.5)
CALCIUM SERPL-MCNC: 9 MG/DL — SIGNIFICANT CHANGE UP (ref 8.4–10.5)
CHLORIDE SERPL-SCNC: 91 MMOL/L — LOW (ref 98–107)
CHLORIDE SERPL-SCNC: 96 MMOL/L — LOW (ref 98–107)
CO2 SERPL-SCNC: 23 MMOL/L — SIGNIFICANT CHANGE UP (ref 22–31)
CO2 SERPL-SCNC: 25 MMOL/L — SIGNIFICANT CHANGE UP (ref 22–31)
CREAT SERPL-MCNC: 4.65 MG/DL — HIGH (ref 0.5–1.3)
CREAT SERPL-MCNC: 7.48 MG/DL — HIGH (ref 0.5–1.3)
EOSINOPHIL # BLD AUTO: 0.05 K/UL — SIGNIFICANT CHANGE UP (ref 0–0.5)
EOSINOPHIL NFR BLD AUTO: 0.7 % — SIGNIFICANT CHANGE UP (ref 0–6)
GLUCOSE BLDC GLUCOMTR-MCNC: 103 MG/DL — HIGH (ref 70–99)
GLUCOSE BLDC GLUCOMTR-MCNC: 139 MG/DL — HIGH (ref 70–99)
GLUCOSE BLDC GLUCOMTR-MCNC: 147 MG/DL — HIGH (ref 70–99)
GLUCOSE BLDC GLUCOMTR-MCNC: 237 MG/DL — HIGH (ref 70–99)
GLUCOSE SERPL-MCNC: 120 MG/DL — HIGH (ref 70–99)
GLUCOSE SERPL-MCNC: 241 MG/DL — HIGH (ref 70–99)
HCT VFR BLD CALC: 30.4 % — LOW (ref 39–50)
HCT VFR BLD CALC: 34.9 % — LOW (ref 39–50)
HGB BLD-MCNC: 10.6 G/DL — LOW (ref 13–17)
HGB BLD-MCNC: 9.6 G/DL — LOW (ref 13–17)
IANC: 5.04 K/UL — SIGNIFICANT CHANGE UP (ref 1.5–8.5)
IMM GRANULOCYTES NFR BLD AUTO: 0.9 % — SIGNIFICANT CHANGE UP (ref 0–1.5)
INR BLD: 1.64 RATIO — HIGH (ref 0.88–1.16)
LACTATE SERPL-SCNC: 2.7 MMOL/L — HIGH (ref 0.5–2)
LYMPHOCYTES # BLD AUTO: 0.68 K/UL — LOW (ref 1–3.3)
LYMPHOCYTES # BLD AUTO: 9.8 % — LOW (ref 13–44)
MAGNESIUM SERPL-MCNC: 1.7 MG/DL — SIGNIFICANT CHANGE UP (ref 1.6–2.6)
MCHC RBC-ENTMCNC: 28.8 PG — SIGNIFICANT CHANGE UP (ref 27–34)
MCHC RBC-ENTMCNC: 29 PG — SIGNIFICANT CHANGE UP (ref 27–34)
MCHC RBC-ENTMCNC: 30.4 GM/DL — LOW (ref 32–36)
MCHC RBC-ENTMCNC: 31.6 GM/DL — LOW (ref 32–36)
MCV RBC AUTO: 91.3 FL — SIGNIFICANT CHANGE UP (ref 80–100)
MCV RBC AUTO: 95.4 FL — SIGNIFICANT CHANGE UP (ref 80–100)
MONOCYTES # BLD AUTO: 1.06 K/UL — HIGH (ref 0–0.9)
MONOCYTES NFR BLD AUTO: 15.3 % — HIGH (ref 2–14)
NEUTROPHILS # BLD AUTO: 5.04 K/UL — SIGNIFICANT CHANGE UP (ref 1.8–7.4)
NEUTROPHILS NFR BLD AUTO: 72.7 % — SIGNIFICANT CHANGE UP (ref 43–77)
NRBC # BLD: 0 /100 WBCS — SIGNIFICANT CHANGE UP
NRBC # BLD: 0 /100 WBCS — SIGNIFICANT CHANGE UP
NRBC # FLD: 0 K/UL — SIGNIFICANT CHANGE UP
NRBC # FLD: 0 K/UL — SIGNIFICANT CHANGE UP
OB PNL STL: NEGATIVE — SIGNIFICANT CHANGE UP
PHOSPHATE SERPL-MCNC: 3.7 MG/DL — SIGNIFICANT CHANGE UP (ref 2.5–4.5)
PLATELET # BLD AUTO: 140 K/UL — LOW (ref 150–400)
PLATELET # BLD AUTO: 149 K/UL — LOW (ref 150–400)
POTASSIUM SERPL-MCNC: 3.5 MMOL/L — SIGNIFICANT CHANGE UP (ref 3.5–5.3)
POTASSIUM SERPL-MCNC: 3.8 MMOL/L — SIGNIFICANT CHANGE UP (ref 3.5–5.3)
POTASSIUM SERPL-SCNC: 3.5 MMOL/L — SIGNIFICANT CHANGE UP (ref 3.5–5.3)
POTASSIUM SERPL-SCNC: 3.8 MMOL/L — SIGNIFICANT CHANGE UP (ref 3.5–5.3)
PROT SERPL-MCNC: 6.6 G/DL — SIGNIFICANT CHANGE UP (ref 6–8.3)
PROT SERPL-MCNC: 7.3 G/DL — SIGNIFICANT CHANGE UP (ref 6–8.3)
PROTHROM AB SERPL-ACNC: 18.4 SEC — HIGH (ref 10.6–13.6)
RBC # BLD: 3.33 M/UL — LOW (ref 4.2–5.8)
RBC # BLD: 3.66 M/UL — LOW (ref 4.2–5.8)
RBC # FLD: 17 % — HIGH (ref 10.3–14.5)
RBC # FLD: 17.1 % — HIGH (ref 10.3–14.5)
SODIUM SERPL-SCNC: 133 MMOL/L — LOW (ref 135–145)
SODIUM SERPL-SCNC: 137 MMOL/L — SIGNIFICANT CHANGE UP (ref 135–145)
WBC # BLD: 6.93 K/UL — SIGNIFICANT CHANGE UP (ref 3.8–10.5)
WBC # BLD: 9.47 K/UL — SIGNIFICANT CHANGE UP (ref 3.8–10.5)
WBC # FLD AUTO: 6.93 K/UL — SIGNIFICANT CHANGE UP (ref 3.8–10.5)
WBC # FLD AUTO: 9.47 K/UL — SIGNIFICANT CHANGE UP (ref 3.8–10.5)

## 2021-07-07 PROCEDURE — 99233 SBSQ HOSP IP/OBS HIGH 50: CPT

## 2021-07-07 PROCEDURE — 99291 CRITICAL CARE FIRST HOUR: CPT

## 2021-07-07 PROCEDURE — 71045 X-RAY EXAM CHEST 1 VIEW: CPT | Mod: 26

## 2021-07-07 PROCEDURE — 93306 TTE W/DOPPLER COMPLETE: CPT | Mod: 26

## 2021-07-07 RX ORDER — HEPARIN SODIUM 5000 [USP'U]/ML
INJECTION INTRAVENOUS; SUBCUTANEOUS
Qty: 25000 | Refills: 0 | Status: DISCONTINUED | OUTPATIENT
Start: 2021-07-07 | End: 2021-07-07

## 2021-07-07 RX ORDER — MUPIROCIN 20 MG/G
1 OINTMENT TOPICAL
Refills: 0 | Status: COMPLETED | OUTPATIENT
Start: 2021-07-07 | End: 2021-07-12

## 2021-07-07 RX ORDER — MIDODRINE HYDROCHLORIDE 2.5 MG/1
20 TABLET ORAL THREE TIMES A DAY
Refills: 0 | Status: DISCONTINUED | OUTPATIENT
Start: 2021-07-08 | End: 2021-07-08

## 2021-07-07 RX ORDER — VANCOMYCIN HCL 1 G
1000 VIAL (EA) INTRAVENOUS ONCE
Refills: 0 | Status: COMPLETED | OUTPATIENT
Start: 2021-07-07 | End: 2021-07-07

## 2021-07-07 RX ORDER — ACETAMINOPHEN 500 MG
1000 TABLET ORAL ONCE
Refills: 0 | Status: COMPLETED | OUTPATIENT
Start: 2021-07-07 | End: 2021-07-07

## 2021-07-07 RX ORDER — MIDODRINE HYDROCHLORIDE 2.5 MG/1
20 TABLET ORAL ONCE
Refills: 0 | Status: COMPLETED | OUTPATIENT
Start: 2021-07-07 | End: 2021-07-07

## 2021-07-07 RX ORDER — PIPERACILLIN AND TAZOBACTAM 4; .5 G/20ML; G/20ML
3.38 INJECTION, POWDER, LYOPHILIZED, FOR SOLUTION INTRAVENOUS ONCE
Refills: 0 | Status: COMPLETED | OUTPATIENT
Start: 2021-07-07 | End: 2021-07-07

## 2021-07-07 RX ORDER — HEPARIN SODIUM 5000 [USP'U]/ML
3500 INJECTION INTRAVENOUS; SUBCUTANEOUS EVERY 6 HOURS
Refills: 0 | Status: DISCONTINUED | OUTPATIENT
Start: 2021-07-07 | End: 2021-07-07

## 2021-07-07 RX ORDER — PIPERACILLIN AND TAZOBACTAM 4; .5 G/20ML; G/20ML
3.38 INJECTION, POWDER, LYOPHILIZED, FOR SOLUTION INTRAVENOUS EVERY 12 HOURS
Refills: 0 | Status: DISCONTINUED | OUTPATIENT
Start: 2021-07-07 | End: 2021-07-09

## 2021-07-07 RX ORDER — SODIUM CHLORIDE 9 MG/ML
1000 INJECTION INTRAMUSCULAR; INTRAVENOUS; SUBCUTANEOUS
Refills: 0 | Status: DISCONTINUED | OUTPATIENT
Start: 2021-07-07 | End: 2021-07-13

## 2021-07-07 RX ORDER — HEPARIN SODIUM 5000 [USP'U]/ML
7000 INJECTION INTRAVENOUS; SUBCUTANEOUS EVERY 6 HOURS
Refills: 0 | Status: DISCONTINUED | OUTPATIENT
Start: 2021-07-07 | End: 2021-07-07

## 2021-07-07 RX ORDER — SODIUM CHLORIDE 9 MG/ML
500 INJECTION INTRAMUSCULAR; INTRAVENOUS; SUBCUTANEOUS ONCE
Refills: 0 | Status: COMPLETED | OUTPATIENT
Start: 2021-07-07 | End: 2021-07-07

## 2021-07-07 RX ORDER — HEPARIN SODIUM 5000 [USP'U]/ML
7000 INJECTION INTRAVENOUS; SUBCUTANEOUS ONCE
Refills: 0 | Status: COMPLETED | OUTPATIENT
Start: 2021-07-07 | End: 2021-07-07

## 2021-07-07 RX ADMIN — Medication 81 MILLIGRAM(S): at 10:38

## 2021-07-07 RX ADMIN — ATORVASTATIN CALCIUM 40 MILLIGRAM(S): 80 TABLET, FILM COATED ORAL at 20:56

## 2021-07-07 RX ADMIN — PIPERACILLIN AND TAZOBACTAM 200 GRAM(S): 4; .5 INJECTION, POWDER, LYOPHILIZED, FOR SOLUTION INTRAVENOUS at 20:09

## 2021-07-07 RX ADMIN — CHLORHEXIDINE GLUCONATE 1 APPLICATION(S): 213 SOLUTION TOPICAL at 10:38

## 2021-07-07 RX ADMIN — Medication 25 MILLIGRAM(S): at 17:43

## 2021-07-07 RX ADMIN — HEPARIN SODIUM 7000 UNIT(S): 5000 INJECTION INTRAVENOUS; SUBCUTANEOUS at 10:54

## 2021-07-07 RX ADMIN — Medication 400 MILLIGRAM(S): at 20:54

## 2021-07-07 RX ADMIN — SODIUM CHLORIDE 50 MILLILITER(S): 9 INJECTION INTRAMUSCULAR; INTRAVENOUS; SUBCUTANEOUS at 20:09

## 2021-07-07 RX ADMIN — SODIUM CHLORIDE 500 MILLILITER(S): 9 INJECTION INTRAMUSCULAR; INTRAVENOUS; SUBCUTANEOUS at 20:50

## 2021-07-07 RX ADMIN — HEPARIN SODIUM 1600 UNIT(S)/HR: 5000 INJECTION INTRAVENOUS; SUBCUTANEOUS at 10:39

## 2021-07-07 RX ADMIN — HEPARIN SODIUM 2000 UNIT(S)/HR: 5000 INJECTION INTRAVENOUS; SUBCUTANEOUS at 11:07

## 2021-07-07 RX ADMIN — Medication 1000 MILLIGRAM(S): at 21:21

## 2021-07-07 RX ADMIN — ERYTHROPOIETIN 20000 UNIT(S): 10000 INJECTION, SOLUTION INTRAVENOUS; SUBCUTANEOUS at 14:21

## 2021-07-07 RX ADMIN — Medication 25 MILLIGRAM(S): at 05:34

## 2021-07-07 RX ADMIN — SODIUM CHLORIDE 50 MILLILITER(S): 9 INJECTION INTRAMUSCULAR; INTRAVENOUS; SUBCUTANEOUS at 21:43

## 2021-07-07 RX ADMIN — Medication 2: at 17:43

## 2021-07-07 RX ADMIN — Medication 650 MILLIGRAM(S): at 17:58

## 2021-07-07 RX ADMIN — HEPARIN SODIUM 7000 UNIT(S): 5000 INJECTION INTRAVENOUS; SUBCUTANEOUS at 11:08

## 2021-07-07 RX ADMIN — PANTOPRAZOLE SODIUM 40 MILLIGRAM(S): 20 TABLET, DELAYED RELEASE ORAL at 05:34

## 2021-07-07 RX ADMIN — MIDODRINE HYDROCHLORIDE 20 MILLIGRAM(S): 2.5 TABLET ORAL at 20:55

## 2021-07-07 RX ADMIN — MUPIROCIN 1 APPLICATION(S): 20 OINTMENT TOPICAL at 17:43

## 2021-07-07 RX ADMIN — AMIODARONE HYDROCHLORIDE 200 MILLIGRAM(S): 400 TABLET ORAL at 05:34

## 2021-07-07 RX ADMIN — PANTOPRAZOLE SODIUM 40 MILLIGRAM(S): 20 TABLET, DELAYED RELEASE ORAL at 17:43

## 2021-07-07 NOTE — CONSULT NOTE ADULT - ASSESSMENT
71 yo M with PMhx of CAD s/p CABG (course at that time complicated by pleural effusion requiring chest tubes), ESRD on HD (MWF), DMII, HTN, afib on coumadin, and anemia presented from [facility] for evaluation of chest pain and GIB.       left sided pleural effusion: loculated and chronic:   CAD:  CABG:  ESRD:   DM:  HTN:  A fibrillation:   GI Bleed    7/7:    left sided pleural effusion: loculated and chronic: he has chr loculated pleural effusion on left side: He had chest tube placement in last June 2020 at another hospital: no chemistry is available but he had negative cytology at that time: This time the effusion seems slightly worse and has loculated effusion with pleural thickening: heis not SOB andhasbeen on roomair: I think , it at all, if this effusion needs to be dealt with : it is probably vats: will contact thoracic surgery : etiology not very clear as there is no previous chemistry: coult be exudative or transudatve: he is on HD and now it is a chr christiano effusion   CAD: cont per cards  CABG: cont current meds  ESRD: on HD   DM: monitor and control  HTN: controlled  A fibrillation: off ac:   GI Bleed: per gi :  DW pt and t eam1

## 2021-07-07 NOTE — CONSULT NOTE ADULT - SUBJECTIVE AND OBJECTIVE BOX
07-07-21 @ 11:22    Patient is a 70y old  Male who presents with a chief complaint of Chest pain and GIB (06 Jul 2021 15:20)      HPI:  69 yo M with PMhx of CAD s/p CABG (course at that time complicated by pleural effusion requiring chest tubes), ESRD on HD (MWF), DMII, HTN, afib on coumadin, and anemia presented from [facility] for evaluation of chest pain and GIB. Patient is AAOX2 and was able to provide some information. Most of the information was obtained from charting. Patient was admitted to [facility] after developing chest pain during HD. He was found to have Hgb of 6 and elevated trops concerning for GIB and NSTEM II. He was give 2 units of pRBCs and monitored off warfarin/aspirin. He underwent bowel prep but it did not show any bloody stool. As a result, no colonoscopy was performed. His course was complicated by aflutter/afib with RVR. He was managed with metoprolol and never required cardioversion. On day of transfer, his Hgb was in the 8s. Patient was deemed stable enough to be restarted on aspirin and warfarin. Patient was transferred to [facility] for further evaluation. Currently, patient have no symptoms but reports to have intermittent chest pain, that is suprasternal, non-radiating, exertional at times, and non-reproducible on palpation. it improves with rest and worsens with movement. He denies any fever, chills, cough, orthopnea, PND, LE edema, abdominal pain, diarrhea, or dysuria.    On the floor, vitals are WNL.  (03 Jul 2021 21:11)    now he is found to have pleural effusion and hence pulmonary called:  he said he had pl effusion before and was drained at that time:  He denies any underlying lung disease but has been a smoker: He sm0ked for > 20 years abut have not been smoking for years:  He has no cough : he is lying supine on bed with no SOB : and denies having cough or phlegm:  He has no chest pain at this time time      ?FOLLOWING PRESENT  [ x] Hx of PE/DVT, [ x] Hx COPD, [x ] Hx of Asthma, [ y] Hx of Hospitalization, [ x]  Hx of BiPAP/CPAP use, [x ] Hx of AMY    Allergies    lisinopril (Other)  opioid-like analgesics (Other)    Intolerances        PAST MEDICAL & SURGICAL HISTORY:  HTN (Hypertension)    DM (Diabetes Mellitus)  x 4 yrs without N/N/R    CAD (Coronary Artery Disease)    Hypercholesterolemia    Coronary Stent  CABG in 2019    Heart Attack  2/1/07 with subsequent stent    H/O: CVA  after cardiac stent placed 12/15/09 -no residual    Hyperthyroidism    Stented coronary artery  total 15 stents from 7967-5406    ESRD (end stage renal disease) on dialysis  MWF    Boil of Buttock  2006 and 2008    S/P cataract extraction    Hemodialysis access, AV graft  5/2015, L arm    S/P CABG x 4        FAMILY HISTORY:  Family history of diabetes mellitus (Sibling)    Family history of coronary artery disease        Social History: [ex smoker: > 20 years  ] TOBACCO                  [ x ] ETOH                                 [ x ] IVDA/DRUGS    REVIEW OF SYSTEMS      General:x	    Skin/Breast:x  	  Ophthalmologic:x  	  ENMT:	x    Respiratory and Thorax: chest pain , HENLEY   	  Cardiovascular:	x    Gastrointestinal:	x    Genitourinary:	x    Musculoskeletal:	x    Neurological:	x    Psychiatric:	x    Hematology/Lymphatics:	x    Endocrine:	x    Allergic/Immunologic:	x    MEDICATIONS  (STANDING):  aMIOdarone    Tablet 200 milliGRAM(s) Oral daily  aspirin  chewable 81 milliGRAM(s) Oral daily  atorvastatin 40 milliGRAM(s) Oral at bedtime  chlorhexidine 2% Cloths 1 Application(s) Topical daily  dextrose 40% Gel 15 Gram(s) Oral once  dextrose 5%. 1000 milliLiter(s) (50 mL/Hr) IV Continuous <Continuous>  dextrose 5%. 1000 milliLiter(s) (100 mL/Hr) IV Continuous <Continuous>  dextrose 50% Injectable 25 Gram(s) IV Push once  dextrose 50% Injectable 12.5 Gram(s) IV Push once  dextrose 50% Injectable 25 Gram(s) IV Push once  epoetin danilo-epbx (RETACRIT) Injectable 42298 Unit(s) IV Push <User Schedule>  glucagon  Injectable 1 milliGRAM(s) IntraMuscular once  heparin  Infusion.  Unit(s)/Hr (16 mL/Hr) IV Continuous <Continuous>  insulin lispro (ADMELOG) corrective regimen sliding scale   SubCutaneous three times a day before meals  insulin lispro (ADMELOG) corrective regimen sliding scale   SubCutaneous at bedtime  metoprolol tartrate 25 milliGRAM(s) Oral two times a day  mupirocin 2% Ointment 1 Application(s) Topical two times a day  pantoprazole  Injectable 40 milliGRAM(s) IV Push every 12 hours    MEDICATIONS  (PRN):  acetaminophen   Tablet .. 650 milliGRAM(s) Oral every 6 hours PRN Temp greater or equal to 38C (100.4F), Mild Pain (1 - 3)  heparin   Injectable 7000 Unit(s) IV Push every 6 hours PRN For aPTT less than 40  heparin   Injectable 3500 Unit(s) IV Push every 6 hours PRN For aPTT between 40 - 57       Vital Signs Last 24 Hrs  T(C): 37.5 (07 Jul 2021 05:30), Max: 37.7 (06 Jul 2021 17:48)  T(F): 99.5 (07 Jul 2021 05:30), Max: 99.9 (06 Jul 2021 17:48)  HR: 96 (07 Jul 2021 05:30) (70 - 111)  BP: 124/80 (07 Jul 2021 05:30) (99/52 - 124/80)  BP(mean): --  RR: 19 (07 Jul 2021 05:30) (19 - 19)  SpO2: 94% (07 Jul 2021 05:30) (92% - 100%)Orthostatic VS          I&O's Summary    07 Jul 2021 07:01  -  07 Jul 2021 11:22  --------------------------------------------------------  IN: 240 mL / OUT: 0 mL / NET: 240 mL        Physical Exam:   GENERAL: NAD, well-groomed, well-developed  HEENT: KATHY/   Atraumatic, Normocephalic  ENMT: No tonsillar erythema, exudates, or enlargement; Moist mucous membranes, Good dentition, No lesions  NECK: Supple, No JVD, Normal thyroid  CHEST/LUNG: decreased air entry left base: no wheezing  CVS: Regular rate and rhythm; No murmurs, rubs, or gallops  GI: : Soft, Nontender, Nondistended; Bowel sounds present  NERVOUS SYSTEM:  Alert & Oriented X2-3  EXTREMITIES:  Mild edema  LYMPH: No lymphadenopathy noted  SKIN: No rashes or lesions  ENDOCRINOLOGY: No Thyromegaly  PSYCH: calm    Labs:  COVID-19 PCR: NotDetec (04 Jul 2021 11:15)                              9.6    6.93  )-----------( 140      ( 07 Jul 2021 08:23 )             30.4                         9.3    7.82  )-----------( 136      ( 06 Jul 2021 04:45 )             30.1                         9.5    6.62  )-----------( 162      ( 05 Jul 2021 09:58 )             30.7                         8.6    7.50  )-----------( 146      ( 04 Jul 2021 06:58 )             28.8                         9.0    8.66  )-----------( 165      ( 03 Jul 2021 21:15 )             29.6     07-07    133<L>  |  91<L>  |  21  ----------------------------<  120<H>  3.5   |  25  |  7.48<H>  07-06    136  |  95<L>  |  13  ----------------------------<  102<H>  3.7   |  27  |  5.12<H>  07-04    140  |  98  |  18  ----------------------------<  125<H>  3.4<L>   |  28  |  5.01<H>  07-03    138  |  96<L>  |  16  ----------------------------<  185<H>  3.5   |  31  |  4.25<H>    Ca    8.9      07 Jul 2021 08:23  Ca    9.1      06 Jul 2021 04:45  Phos  3.7     07-07  Phos  3.4     07-06  Mg     1.70     07-07  Mg     1.70     07-06    TPro  6.6  /  Alb  3.6  /  TBili  1.0  /  DBili  x   /  AST  11  /  ALT  7   /  AlkPhos  73  07-07  TPro  6.2  /  Alb  3.7  /  TBili  0.8  /  DBili  x   /  AST  13  /  ALT  7   /  AlkPhos  72  07-06  TPro  6.1  /  Alb  3.6  /  TBili  0.5  /  DBili  x   /  AST  13  /  ALT  7   /  AlkPhos  71  07-04  TPro  5.7<L>  /  Alb  3.4  /  TBili  0.4  /  DBili  x   /  AST  13  /  ALT  6   /  AlkPhos  72  07-03    CAPILLARY BLOOD GLUCOSE      POCT Blood Glucose.: 147 mg/dL (07 Jul 2021 08:29)  POCT Blood Glucose.: 157 mg/dL (06 Jul 2021 22:00)  POCT Blood Glucose.: 133 mg/dL (06 Jul 2021 17:18)  POCT Blood Glucose.: 147 mg/dL (06 Jul 2021 12:32)    LIVER FUNCTIONS - ( 07 Jul 2021 08:23 )  Alb: 3.6 g/dL / Pro: 6.6 g/dL / ALK PHOS: 73 U/L / ALT: 7 U/L / AST: 11 U/L / GGT: x           PT/INR - ( 07 Jul 2021 10:28 )   PT: 18.4 sec;   INR: 1.64 ratio         PTT - ( 07 Jul 2021 10:28 )  PTT:29.4 sec    D DImer      Studies  Chest X-RAY  CT SCAN Chest   CT Abdomen  Venous Dopplers: LE:   Others    r< from: CT Chest No Cont (07.06.21 @ 20:59) >  INTERPRETATION:  CLINICAL INFORMATION: Shortness of breath, evaluate pleural effusion    COMPARISON: CT chest 7/6/2020, chest x-ray 7/4/2021.    CONTRAST/COMPLICATIONS:  None    FINDINGS:    Hypoattenuating nodules in the right thyroid gland with calcification. The chest wall is normal. There is no mediastinal, hilar, or axillary lymphadenopathy.    The heart size is enlarged. No pericardial effusion. Coronary artery calcification. Status post CABG. Aortic valve calcification.    Calcification of the aorta and its branches. Left subclavian vein stent.    Moderate loculated left pleural effusion with near complete atelectasis of the left lower lobe. Subsegmental atelectasis in the right lower lobe.    Below the diaphragm, small perihepatic ascites is noted.    Degenerative changes of the spine. Status post median sternotomy.    IMPRESSION:  Moderate loculated left pleural effusion with near complete atelectasis of the left lowerlobe.    Small perihepatic ascites.        --- End of Report ---            FLORENCIA JONES MD; Resident Radiologist  This document has been electronically signed.  DARIANA CHAVES MD; Attending Radiologist  This document has been electronically signed. Jul 7 2021 11:08AM    < end of copied text >      < from: CT Chest No Cont (07.06.20 @ 11:02) >      INTERPRETATION:  CT CHEST    CLINICAL INFORMATION:  left pleural effusion with pig tail catheter in    TECHNIQUE: Noncontrast CT of the chest was performed. Coronal andsagittal images were reconstructed. This study was performed using automatic exposure control (radiation dose reduction software) to obtain a diagnostic image quality scan with patient dose as low as reasonably achievable.    COMPARISON: Same day chest x-ray and chest x-ray one day prior, CT chest 6/27/2020    FINDINGS:    LUNGS, AIRWAYS: The central airways are patent. Pulmonary edema and bibasilar atelectasis.    PLEURA: Trace right pleural effusion has developed. Decreased size of loculated left pleural effusion, now small, with pleural pigtail catheter in place. No pneumothorax.    VESSELS: Aortic atherosclerosis without aneurysm. Left subclavian venous stents.    HEART: Mild cardiomegaly. No pericardial effusion. Coronary artery calcifications are present. Status post CABG.    MEDIASTINUM AND YOANA: No adenopathy.    UPPER ABDOMEN: Small ascites.    BONES AND SOFT TISSUES: No acute bony abnormality. Status post sternotomy. Asymmetric left chest wall edema.    IMPRESSION:     Pulmonaryedema and bibasilar atelectasis    Trace right pleural effusion has developed. Decreased size of loculated left pleural effusion, now small, with pleural pigtail catheter in place. No pneumothorax.                VANNA MAHRE M.D., ATTENDING RADIOLOGIST  This document has been electronically signed. Jul 6 2020  3:13PM    < end of copied text >      < from: CT Chest No Cont (06.27.20 @ 09:21) >    EXAM:  CT CHEST                            PROCEDURE DATE:  06/27/2020          INTERPRETATION:  CLINICAL INFORMATION: Large left pleural effusion. Cellulitis. End-stage renal disease    COMPARISON: Chest x-ray 6/26/2020. CT chest 1/20/2020.    PROCEDURE:   CT of the Chest was performed without intravenous contrast.  Sagittal and coronal reformats were performed.    FINDINGS:    LUNGS AND AIRWAYS: Patent central airways.  There is a large loculated left pleural effusion which is increased in size since the previous exam. There is associated left lung compressive atelectasis. Minimal dependent atelectatic changes in the right lung.  PLEURA: No pleural effusion.  MEDIASTINUM AND YOANA: No lymphadenopathy.  VESSELS: Atherosclerotic changes. Left subclavian vein stent.  HEART: Cardiomegaly. Coronary artery calcifications No pericardial effusion.  CHEST WALL AND LOWER NECK: Moderately enlarged thyroid gland is within previous study. This would be better evaluated with ultrasound as clinicallyindicated.  VISUALIZED UPPER ABDOMEN: Within normal limits.  BONES: Sternotomy with prominent sternal dehiscence unchanged from the previous exam. No pre- or poststernal fluid collections. Degenerative changes.    IMPRESSION:     There is a large loculated left pleural effusion which is increased in size since the previous exam. There is associated compressive atelectasis.                  LYNDON URIARTE M.D., ATTENDING RADIOLOGIST  This document has been electronically signed. Jun 27 2020  9:28AM        < end of copied text >

## 2021-07-07 NOTE — PROGRESS NOTE ADULT - ASSESSMENT
70 year old male with possible GI bleed       GI bleed   Monitor CBC   Keep hemoglobin > 8 given cardiac issues   PPI BID   Plan for colonoscopy/ EGD possibly after cardiac optimization     CAD  Hold Coumadin   ASA ok       I was physically present for the key portions of the evaluation and management (E/M) service provided.  The patient was personally seen and examined at bedside.  I have edited the note as appropriate.   Thank you for your consultation and allowing  me to participate in the care of your patients. If you have further questions please contact me at [phone].     Randolph Winters M.D.       _________________________________________________________________________________________________  [facility]  [address] [place] [address]  Office: [phone]    Fortunato Rose PA-C    ___________________________________________________________________________________________________

## 2021-07-07 NOTE — PROGRESS NOTE ADULT - SUBJECTIVE AND OBJECTIVE BOX
S: Denies chest pain or SOB. Review of systems otherwise (-)    Review of Systems:   Constitutional: [ ] fevers, [ ] chills.   Skin: [ ] dry skin. [ ] rashes.  Psychiatric: [ ] depression, [ ] anxiety.   Gastrointestinal: [ ] BRBPR, [ ] melena.   Neurological: [ ] confusion. [ ] seizures. [ ] shuffling gait.   Ears,Nose,Mouth and Throat: [ ] ear pain [ ] sore throat.   Eyes: [ ] diplopia.   Respiratory: [ ] hemoptysis. [ ] shortness of breath  Cardiovascular: See HPI above  Hematologic/Lymphatic: [ ] anemia. [ ] painful nodes. [ ] prolonged bleeding.   Genitourinary: [ ] hematuria. [ ] flank pain.   Endocrine: [ ] significant change in weight. [ ] intolerance to heat and cold.     Review of systems [x ] otherwise negative, [ ] otherwise unable to obtain    FH: no family history of sudden cardiac death in first degree relatives    SH: [ ] tobacco, [ ] alcohol, [ ] drugs    acetaminophen   Tablet .. 650 milliGRAM(s) Oral every 6 hours PRN  aMIOdarone    Tablet 200 milliGRAM(s) Oral daily  aspirin  chewable 81 milliGRAM(s) Oral daily  atorvastatin 40 milliGRAM(s) Oral at bedtime  chlorhexidine 2% Cloths 1 Application(s) Topical daily  dextrose 40% Gel 15 Gram(s) Oral once  dextrose 5%. 1000 milliLiter(s) IV Continuous <Continuous>  dextrose 5%. 1000 milliLiter(s) IV Continuous <Continuous>  dextrose 50% Injectable 25 Gram(s) IV Push once  dextrose 50% Injectable 12.5 Gram(s) IV Push once  dextrose 50% Injectable 25 Gram(s) IV Push once  epoetin danilo-epbx (RETACRIT) Injectable 66741 Unit(s) IV Push <User Schedule>  glucagon  Injectable 1 milliGRAM(s) IntraMuscular once  insulin lispro (ADMELOG) corrective regimen sliding scale   SubCutaneous three times a day before meals  insulin lispro (ADMELOG) corrective regimen sliding scale   SubCutaneous at bedtime  metoprolol tartrate 25 milliGRAM(s) Oral two times a day  mupirocin 2% Ointment 1 Application(s) Topical two times a day  pantoprazole  Injectable 40 milliGRAM(s) IV Push every 12 hours                        9.6    6.93  )-----------( 140      ( 07 Jul 2021 08:23 )             30.4     133<L>  |  91<L>  |  21  ----------------------------<  120<H>  3.5   |  25  |  7.48<H>    Ca    8.9      07 Jul 2021 08:23  Phos  3.7     07-07  Mg     1.70     07-07    TPro  6.6  /  Alb  3.6  /  TBili  1.0  /  DBili  x   /  AST  11  /  ALT  7   /  AlkPhos  73  07-07      CARDIAC MARKERS ( 05 Jul 2021 09:58 )  x     / x     / 35 U/L / x     / 3.1 ng/mL  CARDIAC MARKERS ( 04 Jul 2021 18:16 )  x     / x     / x     / x     / 3.2 ng/mL        T(C): 36.9 (07-07-21 @ 15:00), Max: 37.7 (07-06-21 @ 17:48)  HR: 89 (07-07-21 @ 15:00) (70 - 96)  BP: 107/62 (07-07-21 @ 15:00) (95/53 - 124/80)  RR: 18 (07-07-21 @ 15:00) (18 - 19)  SpO2: 94% (07-07-21 @ 05:30) (92% - 96%)    General: Well nourished in no acute distress. Alert and Oriented * 3.   Head: Normocephalic and atraumatic.   Neck: No JVD. No bruits. Supple. Does not appear to be enlarged.   Cardiovascular: + S1,S2 ; RRR Soft systolic murmur at the left lower sternal border. No rubs noted.    Lungs: CTA b/l. No rhonchi, rales or wheezes.   Abdomen: + BS, soft. Non tender. Non distended. No rebound. No guarding.   Extremities: No clubbing/cyanosis/edema.   Neurologic: Moves all four extremities. Full range of motion.   Skin: Warm and moist. The patient's skin has normal elasticity and good skin turgor.   Psychiatric: Appropriate mood and affect.  Musculoskeletal: Normal range of motion, normal strength    TELEMETRY: AF 90s	      ECG: AFL, RBBB  	    < from: Xray Chest 1 View- PORTABLE-Routine (Xray Chest 1 View- PORTABLE-Routine .) (07.04.21 @ 10:13) >  FINDINGS/  IMPRESSION:    Status post sternotomy. Stent graft projectingover the left subclavian region    Large left pleural effusion and underlying atelectasis. Pulmonary edema.    < end of copied text >    < from: CT Chest No Cont (07.06.21 @ 20:59) >  IMPRESSION:  Moderate loculated left pleural effusion with near complete atelectasis of the left lowerlobe.    Small perihepatic ascites.    < end of copied text >          ASSESSMENT/PLAN: Patient is a 69 y/o male well known to our office with PMH of CAD s/p multiple stents, s/p CABG (9/2019 course c/b Afib and LT pleural effusion), HTN, HLD, DM2, Afib on coumadin, ESRD on HD, and hyperthyroidism who presented to [facility] for chest pain and poss GIB, severe anemia Hgb of 6 requiring PRBCs found to have elevated troponin. Patient transferred to [facility] for further evaluation. Cardiology consulted for further evaluation.    - No active chest pain currently  - Monitor H/H  - Elevated troponin noted in setting of anemia and renal disease with negative CK/CKMB - do not suspect ACS  - COVID negative  - Continue medical management of CAD with ASA/Statin if okay with GI  - Continue amio and metoprolol with hold parameters  - AC with coumadin on hold until cleared by GI  - GI eval with Dr. Roe appreciated  - Fluid removal with HD per renal  - Check TTE to eval LV function and r/o new wall motion abnormalities  - CXR report noted, CT chest noted, f/u Pulm

## 2021-07-07 NOTE — PROGRESS NOTE ADULT - ATTENDING COMMENTS
Patient seen and examined.  Agree with above.   CT with loculated pleural effusion - thoracic surgery eval appreciated  GI eval noted - ac on hold due to anemia and prior GIB  FWU in progress - follow up BCx - ID consult with Dr. Lu Lau MD

## 2021-07-07 NOTE — CHART NOTE - NSCHARTNOTEFT_GEN_A_CORE
IV Tylenol   cc over 60 mins  Per ICU: Midodrine 20 mg 1 tab PO  Zosyn started CC: Dizziness    HPI:   Rectal temp 101    REVIEW OF SYSTEMS:  Constitutional Symptoms: No weakness, fevers, chills  Eyes: No visual changes, headache, eye pain, double vision  Ears, Nose, Mouth, Throat: No runny nose, sinus pain, ear pain, tinnitus, sore throat, dysphagia, odynophagia  Cardiovascular: No chest pain, palpitations, edema  Respiratory: No cough, wheezing, hemoptysis, shortness of breath  Gastrointestinal: No abdominal pain, dysphagia, anorexia, N/V/D/C, hematemesis, BRBPR, melena  Genitourinary: No dysuria, frequency, hematuria  Musculoskeletal: No joint pain, joint swelling, decreased ROM or strength  Skin: No pruritus, rashes, lesions, wounds  Neurologic:  No seizures, headache, paraesthesia, numbness, limb weakness  Psychiatric: No depression, anxiety, difficulty concentrating, anhedonia, lack of energy  Endocrine: No heat/cold intolerance, mood swings, sweats, polydipsia, polyuria  Hematologic/lymphatic: No purpura, petechia, prolonged or excessive bleeding after dental extraction / injury, use of anticoagulant and antiplatelet drugs (including aspirin)  Allergic/Immunologic: No anaphylaxis, allergic response to materials, foods, animals    Positives and pertinent negatives noted and all other systems negative.    Vital Signs Last 24 Hrs  T(C): 38.3 (07 Jul 2021 20:30), Max: 38.8 (07 Jul 2021 17:41)  T(F): 101 (07 Jul 2021 20:30), Max: 101.9 (07 Jul 2021 17:41)  HR: 95 (07 Jul 2021 20:15) (88 - 113)  BP: 80/51 (07 Jul 2021 20:15) (80/51 - 137/69)  BP(mean): --  RR: 17 (07 Jul 2021 20:15) (17 - 19)  SpO2: 96% (07 Jul 2021 20:15) (94% - 96%)      PHYSICAL EXAM:  GENERAL: Lying in bed with blankets up to neck, shivering   EYES: PERRLA  CHEST/LUNG: Demished breath sounds in upper and lower lobe of left lung. No wheezes, rales, or rhonchi  HEART: RRR; No murmurs, rubs, or gallops  ABDOMEN: Soft, non-tender, non-distended; normal bowel sounds, no organomegaly  EXTREMITIES:  2+ peripheral pulses b/l  NEUROLOGY: AAO x 4, no focal deficits. Mentating well and at baseline.       LABS/IMAGING:        ASSESSMENT/PLAN:     1. Hypotension  -  cc over 60 mins. Repeat manual BP after bolus   - MICU consulted. Will see patient at bedside and give further recommendations. For now will give Midodrine 20 mg 1 tab STAT    2. Febrile  - Tylenol IV 1 g STAT  - BCx sent   - Zosyn started   - Lactate 2.7. After fluid bolus is finished, NS at 50 cc/hr x10 to be started   - Repeat lactate in AM    3. Left sided loculated pleural effusion  - CTSx consulted during day. Deciding between pigtail or decortication depending on surgical candidacy   - Repeat CXR ordered      Raysa Leija PA-C  Pager [phone] CC: Dizziness    HPI: 69 yo M with PMhx of CAD s/p CABG (course at that time complicated by pleural effusion requiring chest tubes), ESRD on HD (MWF), DMII, HTN, afib on coumadin, and anemia presented from [facility] for evaluation of chest pain and GIB.  Notified by RN that patient was newly hypotensive to 80/51 and c/o dizziness. Patient assessed at bedside. He states that he has been feeling dizzy on and off while walking around for weeks, and currently is experiencing dizziness while lying in bed. He states he is not having any difficulty breathing and is not having any chest pain. He continues to have rigors but says he feels it is slightly improved from earlier in the day.       REVIEW OF SYSTEMS:  Constitutional Symptoms: (+) Fever, chills, rigors, and dizziness. No weakness  Cardiovascular: No chest pain, palpitations, edema  Respiratory: No cough, wheezing, shortness of breath  Gastrointestinal: No abdominal pain, N/V/D/C    Positives and pertinent negatives noted and all other systems negative.      Vital Signs Last 24 Hrs  T(C): 38.3 (07 Jul 2021 20:30), Max: 38.8 (07 Jul 2021 17:41)  T(F): 101 (07 Jul 2021 20:30), Max: 101.9 (07 Jul 2021 17:41)  HR: 95 (07 Jul 2021 20:15) (88 - 113)  BP: 80/51 (07 Jul 2021 20:15) (80/51 - 137/69)  BP(mean): --  RR: 17 (07 Jul 2021 20:15) (17 - 19)  SpO2: 96% (07 Jul 2021 20:15) (94% - 96%)      PHYSICAL EXAM:  GENERAL: Lying in bed with blankets up to neck, shivering   EYES: PERRLA  CHEST/LUNG: Diminished breath sounds in upper and lower lobe of left lung. No wheezes, rales, or rhonchi  HEART: RRR; No murmurs, rubs, or gallops  ABDOMEN: Soft, non-tender, non-distended; normal bowel sounds, no organomegaly  EXTREMITIES:  2+ peripheral pulses b/l  NEUROLOGY: AAO x 4, no focal deficits. Mentating well and at baseline.       LABS/IMAGING:        ASSESSMENT/PLAN:     1. Hypotension  -  cc over 60 mins. Repeat manual BP after bolus   - MICU consulted. Will see patient at bedside and give further recommendations. For now will give Midodrine 20 mg 1 tab STAT  - Vital signs changed to q4h    2. Febrile  - Rectal temp 101  - Tylenol IV 1 g STAT  - BCx sent   - Zosyn started   - Lactate 2.7. After fluid bolus is finished, NS at 50 cc/hr x10 to be started   - Repeat lactate in AM    3. Left sided loculated pleural effusion  - CTSx consulted during day. Deciding between pigtail or decortication depending on surgical candidacy   - Repeat CXR ordered      ADDENDUM AT 21:30  - Repeat manual BP s/p fluid bolus 102/63. NS at 50 cc/hr to be resumed.       Raysa Leija PA-C  Pager [phone] CC: Dizziness    HPI: 71 yo M with PMhx of CAD s/p CABG (course at that time complicated by pleural effusion requiring chest tubes), ESRD on HD (MWF), DMII, HTN, afib on coumadin, and anemia presented from [facility] for evaluation of chest pain and GIB.  Notified by RN that patient was newly hypotensive to 80/51 and c/o dizziness. Patient assessed at bedside. He states that he has been feeling dizzy on and off while walking around for weeks, and currently is experiencing dizziness while lying in bed. He states he is not having any difficulty breathing and is not having any chest pain. He continues to have rigors but says he feels it is slightly improved from earlier in the day.       REVIEW OF SYSTEMS:  Constitutional Symptoms: (+) Fever, chills, rigors, and dizziness. No weakness  Cardiovascular: No chest pain, palpitations, edema  Respiratory: No cough, wheezing, shortness of breath  Gastrointestinal: No abdominal pain, N/V/D/C    Positives and pertinent negatives noted and all other systems negative.      Vital Signs Last 24 Hrs  T(C): 38.3 (07 Jul 2021 20:30), Max: 38.8 (07 Jul 2021 17:41)  T(F): 101 (07 Jul 2021 20:30), Max: 101.9 (07 Jul 2021 17:41)  HR: 95 (07 Jul 2021 20:15) (88 - 113)  BP: 80/51 (07 Jul 2021 20:15) (80/51 - 137/69)  BP(mean): --  RR: 17 (07 Jul 2021 20:15) (17 - 19)  SpO2: 96% (07 Jul 2021 20:15) (94% - 96%)      PHYSICAL EXAM:  GENERAL: Lying in bed with blankets up to neck, shivering   EYES: PERRLA  CHEST/LUNG: Diminished breath sounds in upper and lower lobe of left lung. No wheezes, rales, or rhonchi  HEART: RRR; No murmurs, rubs, or gallops  ABDOMEN: Soft, non-tender, non-distended; normal bowel sounds, no organomegaly  EXTREMITIES:  2+ peripheral pulses b/l  NEUROLOGY: AAO x 4, no focal deficits. Mentating well and at baseline.       LABS/IMAGING:        ASSESSMENT/PLAN:     1. Hypotension  -  cc over 60 mins. Repeat manual BP after bolus   - MICU consulted. Will see patient at bedside and give further recommendations. For now will give Midodrine 20 mg 1 tab STAT  - Vital signs changed to q4h    2. Febrile  - Rectal temp 101  - Tylenol IV 1 g STAT  - BCx sent   - Zosyn started   - Lactate 2.7. After fluid bolus is finished, NS at 50 cc/hr x10 to be started   - Repeat lactate in AM    3. Left sided loculated pleural effusion  - CTSx consulted during day. Deciding between pigtail or decortication depending on surgical candidacy   - Repeat CXR ordered      ADDENDUM AT 21:30  - Repeat manual BP s/p fluid bolus 102/63. NS at 50 cc/hr to be resumed. Repeat temp 100.0    ADDENDUM AT 23:30  - Per MICU c/s, will give Vancomycin 1 g IV x1 and dose by level. Midodrine 20 mg TID started.       Raysa Leija PA-C  Pager [phone] CC: Dizziness    HPI: 69 yo M with PMhx of CAD s/p CABG (course at that time complicated by pleural effusion requiring chest tubes), ESRD on HD (MWF), DMII, HTN, afib on coumadin, and anemia presented from [facility] for evaluation of chest pain and GIB.  Notified by RN that patient was newly hypotensive to 80/51 and c/o dizziness. Patient assessed at bedside. He states that he has been feeling dizzy on and off while walking around for weeks, and currently is experiencing dizziness while lying in bed. He states he is not having any difficulty breathing and is not having any chest pain. He continues to have rigors but says he feels it is slightly improved from earlier in the day.       REVIEW OF SYSTEMS:  Constitutional Symptoms: (+) Fever, chills, rigors, and dizziness. No weakness  Cardiovascular: No chest pain, palpitations, edema  Respiratory: No cough, wheezing, shortness of breath  Gastrointestinal: No abdominal pain, N/V/D/C    Positives and pertinent negatives noted and all other systems negative.      Vital Signs Last 24 Hrs  T(C): 38.3 (07 Jul 2021 20:30), Max: 38.8 (07 Jul 2021 17:41)  T(F): 101 (07 Jul 2021 20:30), Max: 101.9 (07 Jul 2021 17:41)  HR: 95 (07 Jul 2021 20:15) (88 - 113)  BP: 80/51 (07 Jul 2021 20:15) (80/51 - 137/69)  BP(mean): --  RR: 17 (07 Jul 2021 20:15) (17 - 19)  SpO2: 96% (07 Jul 2021 20:15) (94% - 96%)      PHYSICAL EXAM:  GENERAL: Lying in bed with blankets up to neck, shivering   EYES: PERRLA  CHEST/LUNG: Diminished breath sounds in upper and lower lobe of left lung. No wheezes, rales, or rhonchi  HEART: RRR; No murmurs, rubs, or gallops  ABDOMEN: Soft, non-tender, non-distended; normal bowel sounds, no organomegaly  EXTREMITIES:  2+ peripheral pulses b/l  NEUROLOGY: AAO x 4, no focal deficits. Mentating well and at baseline.       LABS/IMAGING:        ASSESSMENT/PLAN:     1. Hypotension  -  cc over 60 mins. Repeat manual BP after bolus   - MICU consulted. Will see patient at bedside and give further recommendations. For now will give Midodrine 20 mg 1 tab STAT  - Vital signs changed to q4h    2. Febrile  - Rectal temp 101  - Tylenol IV 1 g STAT  - BCx sent   - Zosyn started   - Lactate 2.7. After fluid bolus is finished, NS at 50 cc/hr x10 to be started   - Repeat lactate in AM    3. Left sided loculated pleural effusion  - CTSx consulted during day. Deciding between pigtail or decortication depending on surgical candidacy   - Repeat CXR ordered          ADDENDUM AT 21:30  - Repeat manual BP s/p fluid bolus 102/63. NS at 50 cc/hr to be resumed. Repeat temp 100.0      ADDENDUM AT 23:30  - Per MICU c/s, will give Vancomycin 1 g IV x1 and dose by level. Midodrine 20 mg TID started.       ADDENDUM AT 00:20  - Notified by RN that patient hypotensive to 83/50 and asymptomatic. Midodrine 10 mg PO x1 ordered  - Rectal temp now 98.0. Rigors and dizziness has resolved  - Within a couple minutes of starting vancomycin, RN stated that patient was complaining that the arm with the IV was itching him. Chart reviewed. Patient previously received Vancomycin in past admissions without reaction. Arm without erythema, edema, rash, or hives. Benadryl 25 mg IV, calamine lotion, and ice packs ordered. Vancomycin to be restarted with patient monitored closely. If itching reoccurs or symptoms worsen, will d/c vancomycin and consider changing antibiotic.           Raysa Leija PA-C  Pager [phone] CC: Dizziness    HPI: 69 yo M with PMhx of CAD s/p CABG (course at that time complicated by pleural effusion requiring chest tubes), ESRD on HD (MWF), DMII, HTN, afib on coumadin, and anemia presented from [facility] for evaluation of chest pain and GIB.  Notified by RN that patient was newly hypotensive to 80/51 and c/o dizziness. Patient assessed at bedside. He states that he has been feeling dizzy on and off while walking around for weeks, and currently is experiencing dizziness while lying in bed. He states he is not having any difficulty breathing and is not having any chest pain. He continues to have rigors but says he feels it is slightly improved from earlier in the day.       REVIEW OF SYSTEMS:  Constitutional Symptoms: (+) Fever, chills, rigors, and dizziness. No weakness  Cardiovascular: No chest pain, palpitations, edema  Respiratory: No cough, wheezing, shortness of breath  Gastrointestinal: No abdominal pain, N/V/D/C    Positives and pertinent negatives noted and all other systems negative.      Vital Signs Last 24 Hrs  T(C): 38.3 (07 Jul 2021 20:30), Max: 38.8 (07 Jul 2021 17:41)  T(F): 101 (07 Jul 2021 20:30), Max: 101.9 (07 Jul 2021 17:41)  HR: 95 (07 Jul 2021 20:15) (88 - 113)  BP: 80/51 (07 Jul 2021 20:15) (80/51 - 137/69)  BP(mean): --  RR: 17 (07 Jul 2021 20:15) (17 - 19)  SpO2: 96% (07 Jul 2021 20:15) (94% - 96%)      PHYSICAL EXAM:  GENERAL: Lying in bed with blankets up to neck, shivering   EYES: PERRLA  CHEST/LUNG: Diminished breath sounds in upper and lower lobe of left lung. No wheezes, rales, or rhonchi  HEART: RRR; No murmurs, rubs, or gallops  ABDOMEN: Soft, non-tender, non-distended; normal bowel sounds, no organomegaly  EXTREMITIES:  2+ peripheral pulses b/l  NEUROLOGY: AAO x 4, no focal deficits. Mentating well and at baseline.       LABS/IMAGING:      < from: Xray Chest 1 View- PORTABLE-Urgent (Xray Chest 1 View- PORTABLE-Urgent .) (07.07.21 @ 21:52) >    EXAM:  XR CHEST PORTABLE URGENT 1V        PROCEDURE DATE:  Jul 7 2021     ******PRELIMINARY REPORT******    ******PRELIMINARY REPORT******            INTERPRETATION:  No emergent findings.    < end of copied text >        ASSESSMENT/PLAN:     1. Hypotension  -  cc over 60 mins. Repeat manual BP after bolus   - MICU consulted. Will see patient at bedside and give further recommendations. For now will give Midodrine 20 mg 1 tab STAT  - Vital signs changed to q4h    2. Febrile  - Rectal temp 101  - Tylenol IV 1 g STAT  - BCx sent   - Zosyn started   - Lactate 2.7. After fluid bolus is finished, NS at 50 cc/hr x10 to be started   - Repeat lactate in AM    3. Left sided loculated pleural effusion  - CTSx consulted during day. Deciding between pigtail or decortication depending on surgical candidacy   - Repeat CXR ordered          ADDENDUM AT 21:30  - Repeat manual BP s/p fluid bolus 102/63. NS at 50 cc/hr to be resumed. Repeat temp 100.0      ADDENDUM AT 23:30  - Per MICU c/s, will give Vancomycin 1 g IV x1 and dose by level. Midodrine 20 mg TID started.       ADDENDUM AT 00:20  - Notified by RN that patient hypotensive to 83/50 and asymptomatic. Midodrine 10 mg PO x1 ordered  - Rectal temp now 98.0. Rigors and dizziness has resolved  - Within a couple minutes of starting vancomycin, RN stated that patient was complaining that the arm with the IV was itching him. Chart reviewed. Patient previously received Vancomycin in past admissions without reaction. Arm without erythema, edema, rash, or hives. Benadryl 25 mg IV, calamine lotion, and ice packs ordered. Vancomycin to be restarted with patient monitored closely. If itching reoccurs or symptoms worsen, will d/c vancomycin and consider changing antibiotic.           Raysa Leija PA-C  Pager [phone]

## 2021-07-07 NOTE — CONSULT NOTE ADULT - SUBJECTIVE AND OBJECTIVE BOX
CHIEF COMPLAINT: chest pain and GIB    HPI:    69 yo M with PMhx of CAD s/p CABG (course at that time complicated by pleural effusion requiring chest tubes), ESRD on HD (MWF), DMII, HTN, afib on coumadin, and anemia presented from [facility] for evaluation of chest pain and GIB. Patient is AAOX2 and was able to provide some information. Most of the information was obtained from charting. Patient was admitted to [facility] after developing chest pain during HD. He was found to have Hgb of 6 and elevated trops concerning for GIB and NSTEM II. He was give 2 units of pRBCs and monitored off warfarin/aspirin. He underwent bowel prep but it did not show any bloody stool. As a result, no colonoscopy was performed. His course was complicated by aflutter/afib with RVR. He was managed with metoprolol and never required cardioversion. On day of transfer, his Hgb was in the 8s. Patient was deemed stable enough to be restarted on aspirin and warfarin. Patient was transferred to [facility] for further evaluation. Currently, patient have no symptoms but reports to have intermittent chest pain, that is suprasternal, non-radiating, exertional at times, and non-reproducible on palpation. it improves with rest and worsens with movement. He denies any fever, chills, cough, orthopnea, PND, LE edema, abdominal pain, diarrhea, or dysuria.    On the floor, vitals are WNL.     MICU was consulted for hypotension to 80/51. Patient is currently asymptomatic, denying f/c, n/v, dizziness, HA, CP, SOB at rest, abd pain. He appropriately answers all questions and is A.O. x3. He received 500 cc NS bolus and midodrine 20 mg x1 with improvement of BP to 102/60.    PAST MEDICAL & SURGICAL HISTORY:  HTN (Hypertension)    DM (Diabetes Mellitus)  x 4 yrs without N/N/R    CAD (Coronary Artery Disease)    Hypercholesterolemia    Coronary Stent  CABG in 2019    Heart Attack  2/1/07 with subsequent stent    H/O: CVA  after cardiac stent placed 12/15/09 -no residual    Hyperthyroidism    Stented coronary artery  total 15 stents from 4992-2641    ESRD (end stage renal disease) on dialysis  MWF    Boil of Buttock  2006 and 2008    S/P cataract extraction    Hemodialysis access, AV graft  5/2015, L arm    S/P CABG x 4        FAMILY HISTORY:  Family history of diabetes mellitus (Sibling)    Family history of coronary artery disease        SOCIAL HISTORY:  He lives with his son. Denies any hx of smoking or alcohol consumption. No illicit drug use. Can perform some ADLs.    Allergies    lisinopril (Other)  opioid-like analgesics (Other)    Intolerances    REVIEW OF SYSTEMS:    CONSTITUTIONAL: No weakness, fevers or chills  EYES/ENT: No visual changes;  No vertigo or throat pain   MOUTH: No oral lesion, moist  NECK: No pain or stiffness  RESPIRATORY: No cough, wheezing, hemoptysis; No shortness of breath  CARDIOVASCULAR: No chest pain or palpitations  GASTROINTESTINAL: No abdominal or epigastric pain. No nausea, vomiting, or hematemesis; No diarrhea or constipation. No melena or hematochezia.  GENITOURINARY: No dysuria, frequency or hematuria  NEUROLOGICAL: No numbness or weakness  SKIN: No itching, rashes  PSYCH: no confusion or altered mental status      OBJECTIVE:  ICU Vital Signs Last 24 Hrs  T(C): 37.8 (07 Jul 2021 21:20), Max: 38.8 (07 Jul 2021 17:41)  T(F): 100 (07 Jul 2021 21:20), Max: 101.9 (07 Jul 2021 17:41)  HR: 98 (07 Jul 2021 21:20) (88 - 113)  BP: 102/63 (07 Jul 2021 21:20) (80/51 - 137/69)  BP(mean): --  ABP: --  ABP(mean): --  RR: 17 (07 Jul 2021 21:20) (17 - 19)  SpO2: 95% (07 Jul 2021 21:20) (94% - 96%)        07-07 @ 07:01  -  07-07 @ 22:20  --------------------------------------------------------  IN: 880 mL / OUT: 1900 mL / NET: -1020 mL      CAPILLARY BLOOD GLUCOSE      POCT Blood Glucose.: 139 mg/dL (07 Jul 2021 22:07)      PHYSICAL EXAM:  GENERAL: NAD, lying in bed comfortably  HEAD:  Atraumatic, normocephalic  EYES: EOMI, PERRLA, conjunctiva and sclera clear  ENT: Moist mucous membranes  NECK: Supple, no JVD  HEART: Regular rate and rhythm, no murmurs, rubs, or gallops  LUNGS: Unlabored respirations.  Decreased breath sounds at left base, mild crackles at R base  ABDOMEN: Soft, nontender, nondistended, +BS  EXTREMITIES: 2+ peripheral pulses bilaterally. No clubbing, cyanosis, or edema  NERVOUS SYSTEM:  A&Ox3, no focal deficits   SKIN: No rashes or lesions, warm    HOSPITAL MEDICATIONS:  MEDICATIONS  (STANDING):  aMIOdarone    Tablet 200 milliGRAM(s) Oral daily  aspirin  chewable 81 milliGRAM(s) Oral daily  atorvastatin 40 milliGRAM(s) Oral at bedtime  chlorhexidine 2% Cloths 1 Application(s) Topical daily  dextrose 40% Gel 15 Gram(s) Oral once  dextrose 5%. 1000 milliLiter(s) (50 mL/Hr) IV Continuous <Continuous>  dextrose 5%. 1000 milliLiter(s) (100 mL/Hr) IV Continuous <Continuous>  dextrose 50% Injectable 25 Gram(s) IV Push once  dextrose 50% Injectable 12.5 Gram(s) IV Push once  dextrose 50% Injectable 25 Gram(s) IV Push once  epoetin danilo-epbx (RETACRIT) Injectable 50610 Unit(s) IV Push <User Schedule>  glucagon  Injectable 1 milliGRAM(s) IntraMuscular once  insulin lispro (ADMELOG) corrective regimen sliding scale   SubCutaneous three times a day before meals  insulin lispro (ADMELOG) corrective regimen sliding scale   SubCutaneous at bedtime  metoprolol tartrate 25 milliGRAM(s) Oral two times a day  mupirocin 2% Ointment 1 Application(s) Topical two times a day  pantoprazole  Injectable 40 milliGRAM(s) IV Push every 12 hours  piperacillin/tazobactam IVPB.. 3.375 Gram(s) IV Intermittent every 12 hours  sodium chloride 0.9%. 1000 milliLiter(s) (50 mL/Hr) IV Continuous <Continuous>    MEDICATIONS  (PRN):  acetaminophen   Tablet .. 650 milliGRAM(s) Oral every 6 hours PRN Temp greater or equal to 38C (100.4F), Mild Pain (1 - 3)      LABS:                        10.6   9.47  )-----------( 149      ( 07 Jul 2021 17:50 )             34.9     07-07    137  |  96<L>  |  10  ----------------------------<  241<H>  3.8   |  23  |  4.65<H>    Ca    9.0      07 Jul 2021 18:29  Phos  3.7     07-07  Mg     1.70     07-07    TPro  7.3  /  Alb  4.1  /  TBili  0.9  /  DBili  x   /  AST  16  /  ALT  9   /  AlkPhos  83  07-07    PT/INR - ( 07 Jul 2021 10:28 )   PT: 18.4 sec;   INR: 1.64 ratio         PTT - ( 07 Jul 2021 17:50 )  PTT:65.5 sec

## 2021-07-07 NOTE — CONSULT NOTE ADULT - ASSESSMENT
70M h/o CAD s/p CABG '19, ESRD on HD (MWF), afib on coumadin with large, left-sided loculated pleural effusion.     - Patient is currently afebrile and non-leukocytotic, denies dyspnea at rest or on exertion; dry or productive cough; hemoptysis or hematemesis.        70M h/o CAD s/p CABG '19, ESRD on HD (MWF), afib on coumadin with large, left-sided loculated pleural effusion.       - Patient would benefit from pleural biopsy, drainage of effusion and decortication, if medically feasible. If deemed not an ideal surgical candidate (will discuss w/cardiology and medical teams) then likely would benefit from radiologic-guided pigtail catheter insertion.   - Will continue to follow  - Above plan d/w Dr. Galindo Cazares [facility] PGY6  Thoracic Surgery  [id number]        70M h/o CAD s/p CABG '19, ESRD on HD (MWF), afib on coumadin with recurrent,  moderate, left-sided loculated pleural effusion.       - Patient would benefit from pleural biopsy, drainage of effusion and decortication, if medically feasible. If deemed not an ideal surgical candidate (will discuss w/cardiology and medical teams) then likely would benefit from radiologic-guided pigtail catheter insertion.   - Will continue to follow  - Above plan d/w Dr. Galindo Cazares [facility] PGY6  Thoracic Surgery  [phone]

## 2021-07-07 NOTE — CONSULT NOTE ADULT - SUBJECTIVE AND OBJECTIVE BOX
Thoracic Surgery  CC: loculated left-sided pleural effusion  HPI: 70M h/o CAD s/p CABG '19, ESRD on HD (MWF), afib on coumadin who was admitted recently for chest pain / anemia and found to have a large, left-sided loculated pleural effusion. Patient is currently afebrile and non-leukocytotic. Patient denies dyspnea at rest or on exertion; dry or productive cough; hemoptysis or hematemesis.         Medical and Surgical History  HTN  DM II   CAD s/p stent, CABG  Hypercholesterolemia  CVA  Hyperthyroidism  ESRD           Review of Systems:   General: denies weight change, fever or fatigue  HEENT: denies sore throat, hoarseness  Respiratory: denies cough, shortness of breath at rest and on exertion, wheezing  Cardiovascular: denies chest pain, abnormal heart rhythm, PND, palpitations  Gastrointestinal: denies nausea, vomiting, diarrhea, bloody or black bowel movements  Genitourinary: denies frequent urination, painful urination, kidney disease  Neurological: denies seizures, headaches  Muscoloskeletal: denies any joint pains  Psychiatric: denies depression, anxiety    MEDICATIONS  (STANDING):  aMIOdarone    Tablet 200 milliGRAM(s) Oral daily  aspirin  chewable 81 milliGRAM(s) Oral daily  atorvastatin 40 milliGRAM(s) Oral at bedtime  chlorhexidine 2% Cloths 1 Application(s) Topical daily  dextrose 40% Gel 15 Gram(s) Oral once  dextrose 5%. 1000 milliLiter(s) (50 mL/Hr) IV Continuous <Continuous>  dextrose 5%. 1000 milliLiter(s) (100 mL/Hr) IV Continuous <Continuous>  dextrose 50% Injectable 25 Gram(s) IV Push once  dextrose 50% Injectable 12.5 Gram(s) IV Push once  dextrose 50% Injectable 25 Gram(s) IV Push once  epoetin danilo-epbx (RETACRIT) Injectable 54886 Unit(s) IV Push <User Schedule>  glucagon  Injectable 1 milliGRAM(s) IntraMuscular once  insulin lispro (ADMELOG) corrective regimen sliding scale   SubCutaneous three times a day before meals  insulin lispro (ADMELOG) corrective regimen sliding scale   SubCutaneous at bedtime  metoprolol tartrate 25 milliGRAM(s) Oral two times a day  mupirocin 2% Ointment 1 Application(s) Topical two times a day  pantoprazole  Injectable 40 milliGRAM(s) IV Push every 12 hours    MEDICATIONS  (PRN):  acetaminophen   Tablet .. 650 milliGRAM(s) Oral every 6 hours PRN Temp greater or equal to 38C (100.4F), Mild Pain (1 - 3)    Allergies  lisinopril (Other)  opioid-like analgesics (Other)  Intolerances      Social History  Smoking Hx: former  Etoh Hx: denies  IVDA Hx: denies    Family History  Family history of diabetes mellitus (Sibling)  Family history of coronary artery disease      Objective:   Vital Signs Last 24 Hrs  T(C): 36.9 (07 Jul 2021 15:00), Max: 37.7 (06 Jul 2021 17:48)  T(F): 98.4 (07 Jul 2021 15:00), Max: 99.9 (06 Jul 2021 17:48)  HR: 89 (07 Jul 2021 15:00) (70 - 96)  BP: 107/62 (07 Jul 2021 15:00) (95/53 - 124/80)  BP(mean): --  RR: 18 (07 Jul 2021 15:00) (18 - 19)  SpO2: 94% (07 Jul 2021 05:30) (92% - 96%)    Physical Exam:  General: Well developed, well nourished, alert and cooperative, and appears to be in no acute distress.  HEENT: normocephalic, vision is grossly intact  Neck: Neck supple, non-tender without lymphadenopathy, masses or thyromegaly  Chest: lungs clear bilaterally, non-labored breathing, no wheezing or rhonci  Cardiac: Regular rhythm, rate of , +S1 & S2, no murmurs, rubs or gallops  Abdomen: soft, non-distended, non-tender, no guarding or rebound  Extremities: No significant deformity or joint abnormality. No edema. Peripheral pulses intact. No varicosities.  MusculoskeletalL: Adequately aligned spine. ROM intact spine and extremities. No joint erythema or tenderness. Normal muscular development. Normal gait.  Neurological: no focal deficits, strength and sensation symmetric and intact throughout. Reflexes 2+ throughout.   Skin: Skin normal color, texture and turgor with no lesions or eruptions.    LABS:                        9.6    6.93  )-----------( 140      ( 07 Jul 2021 08:23 )             30.4     07-07    133<L>  |  91<L>  |  21  ----------------------------<  120<H>  3.5   |  25  |  7.48<H>    Ca    8.9      07 Jul 2021 08:23  Phos  3.7     07-07  Mg     1.70     07-07    TPro  6.6  /  Alb  3.6  /  TBili  1.0  /  DBili  x   /  AST  11  /  ALT  7   /  AlkPhos  73  07-07    PT/INR - ( 07 Jul 2021 10:28 )   PT: 18.4 sec;   INR: 1.64 ratio         PTT - ( 07 Jul 2021 10:28 )  PTT:29.4 sec    Thoracic Surgery  CC: loculated left-sided pleural effusion  HPI: 70M h/o CAD s/p CABG '19, ESRD on HD (MWF), afib on coumadin who was admitted recently for chest pain / anemia and found to have a large, left-sided loculated pleural effusion. Previous thoracentesis was negative for malignancy. Patient is currently febrile (101.8) although non-leukocytotic; cultures pending. Patient experiences exertional dyspnea (at baseline, can only do several stairs), however denies dyspnea at rest; chest pain; dry or productive cough; hemoptysis or hematemesis.         Medical and Surgical History  HTN  DM II   CAD s/p stent, CABG  Hypercholesterolemia  CVA  Hyperthyroidism  ESRD           Review of Systems:   General: denies weight change or fatigue  HEENT: denies sore throat, hoarseness  Respiratory: see HPI  Cardiovascular: denies chest pain,   Gastrointestinal: denies nausea, vomiting, diarrhea   Genitourinary: denies frequent urination      MEDICATIONS  (STANDING):  aMIOdarone    Tablet 200 milliGRAM(s) Oral daily  aspirin  chewable 81 milliGRAM(s) Oral daily  atorvastatin 40 milliGRAM(s) Oral at bedtime  chlorhexidine 2% Cloths 1 Application(s) Topical daily  dextrose 40% Gel 15 Gram(s) Oral once  dextrose 5%. 1000 milliLiter(s) (50 mL/Hr) IV Continuous <Continuous>  dextrose 5%. 1000 milliLiter(s) (100 mL/Hr) IV Continuous <Continuous>  dextrose 50% Injectable 25 Gram(s) IV Push once  dextrose 50% Injectable 12.5 Gram(s) IV Push once  dextrose 50% Injectable 25 Gram(s) IV Push once  epoetin danilo-epbx (RETACRIT) Injectable 98567 Unit(s) IV Push <User Schedule>  glucagon  Injectable 1 milliGRAM(s) IntraMuscular once  insulin lispro (ADMELOG) corrective regimen sliding scale   SubCutaneous three times a day before meals  insulin lispro (ADMELOG) corrective regimen sliding scale   SubCutaneous at bedtime  metoprolol tartrate 25 milliGRAM(s) Oral two times a day  mupirocin 2% Ointment 1 Application(s) Topical two times a day  pantoprazole  Injectable 40 milliGRAM(s) IV Push every 12 hours    MEDICATIONS  (PRN):  acetaminophen   Tablet .. 650 milliGRAM(s) Oral every 6 hours PRN Temp greater or equal to 38C (100.4F), Mild Pain (1 - 3)    Allergies  lisinopril (Other)  opioid-like analgesics (Other)  Intolerances      Social History  Smoking Hx: former  Etoh Hx: denies  IVDA Hx: denies    Family History  Family history of diabetes mellitus (Sibling)  Family history of coronary artery disease      Objective:   Vital Signs Last 24 Hrs  T(C): 36.9 (07 Jul 2021 15:00), Max: 37.7 (06 Jul 2021 17:48)  T(F): 98.4 (07 Jul 2021 15:00), Max: 99.9 (06 Jul 2021 17:48)  HR: 89 (07 Jul 2021 15:00) (70 - 96)  BP: 107/62 (07 Jul 2021 15:00) (95/53 - 124/80)  BP(mean): --  RR: 18 (07 Jul 2021 15:00) (18 - 19)  SpO2: 94% (07 Jul 2021 05:30) (92% - 96%)    Physical Exam:  General: no distress, sitting up on bed  HEENT: normocephalic   Neck: Neck supple, non-tender   Chest: non-labored breathing on room air, no wheezing or rhonci  Cardiac: irregularly irregular, rate of 88  Abdomen: soft, non-distended, non-tender, no guarding or rebound  Extremities: No significant deformity or joint abnormality. No edema. Peripheral pulses intact. No varicosities.  Neurological: no focal deficit     LABS:                        9.6    6.93  )-----------( 140      ( 07 Jul 2021 08:23 )             30.4     07-07    133<L>  |  91<L>  |  21  ----------------------------<  120<H>  3.5   |  25  |  7.48<H>    Ca    8.9      07 Jul 2021 08:23  Phos  3.7     07-07  Mg     1.70     07-07    TPro  6.6  /  Alb  3.6  /  TBili  1.0  /  DBili  x   /  AST  11  /  ALT  7   /  AlkPhos  73  07-07    PT/INR - ( 07 Jul 2021 10:28 )   PT: 18.4 sec;   INR: 1.64 ratio         PTT - ( 07 Jul 2021 10:28 )  PTT:29.4 sec    Thoracic Surgery  CC: loculated left-sided pleural effusion  HPI: 70M h/o CAD s/p CABG '19, ESRD on HD (MWF), afib on coumadin who was admitted recently for chest pain / anemia and found to have a recurrent, moderate, left-sided loculated pleural effusion. Previous thoracentesis was negative for malignancy. Patient is currently febrile (101.8) w/rigors; cultures pending. Patient experiences exertional dyspnea (at baseline, can only do several stairs), however denies dyspnea at rest; chest pain; dry or productive cough; hemoptysis or hematemesis.         Medical and Surgical History  HTN  DM II   CAD s/p stent, CABG  Hypercholesterolemia  CVA  Hyperthyroidism  ESRD           Review of Systems:   General: denies weight change or fatigue  HEENT: denies sore throat, hoarseness  Respiratory: see HPI  Cardiovascular: denies chest pain,   Gastrointestinal: denies nausea, vomiting, diarrhea   Genitourinary: denies frequent urination      MEDICATIONS  (STANDING):  aMIOdarone    Tablet 200 milliGRAM(s) Oral daily  aspirin  chewable 81 milliGRAM(s) Oral daily  atorvastatin 40 milliGRAM(s) Oral at bedtime  chlorhexidine 2% Cloths 1 Application(s) Topical daily  dextrose 40% Gel 15 Gram(s) Oral once  dextrose 5%. 1000 milliLiter(s) (50 mL/Hr) IV Continuous <Continuous>  dextrose 5%. 1000 milliLiter(s) (100 mL/Hr) IV Continuous <Continuous>  dextrose 50% Injectable 25 Gram(s) IV Push once  dextrose 50% Injectable 12.5 Gram(s) IV Push once  dextrose 50% Injectable 25 Gram(s) IV Push once  epoetin danilo-epbx (RETACRIT) Injectable 21294 Unit(s) IV Push <User Schedule>  glucagon  Injectable 1 milliGRAM(s) IntraMuscular once  insulin lispro (ADMELOG) corrective regimen sliding scale   SubCutaneous three times a day before meals  insulin lispro (ADMELOG) corrective regimen sliding scale   SubCutaneous at bedtime  metoprolol tartrate 25 milliGRAM(s) Oral two times a day  mupirocin 2% Ointment 1 Application(s) Topical two times a day  pantoprazole  Injectable 40 milliGRAM(s) IV Push every 12 hours    MEDICATIONS  (PRN):  acetaminophen   Tablet .. 650 milliGRAM(s) Oral every 6 hours PRN Temp greater or equal to 38C (100.4F), Mild Pain (1 - 3)    Allergies  lisinopril (Other)  opioid-like analgesics (Other)  Intolerances      Social History  Smoking Hx: former  Etoh Hx: denies  IVDA Hx: denies    Family History  Family history of diabetes mellitus (Sibling)  Family history of coronary artery disease      Objective:   Vital Signs Last 24 Hrs  T(C): 36.9 (07 Jul 2021 15:00), Max: 37.7 (06 Jul 2021 17:48)  T(F): 98.4 (07 Jul 2021 15:00), Max: 99.9 (06 Jul 2021 17:48)  HR: 89 (07 Jul 2021 15:00) (70 - 96)  BP: 107/62 (07 Jul 2021 15:00) (95/53 - 124/80)  BP(mean): --  RR: 18 (07 Jul 2021 15:00) (18 - 19)  SpO2: 94% (07 Jul 2021 05:30) (92% - 96%)    Physical Exam:  General: no distress, sitting up on bed  HEENT: normocephalic   Neck: Neck supple, non-tender   Chest: non-labored breathing on room air, no wheezing or rhonci  Cardiac: irregularly irregular, rate of 88  Abdomen: soft, non-distended, non-tender, no guarding or rebound  Extremities: No significant deformity or joint abnormality. No edema. Peripheral pulses intact. No varicosities.  Neurological: no focal deficit     LABS:                        9.6    6.93  )-----------( 140      ( 07 Jul 2021 08:23 )             30.4     07-07    133<L>  |  91<L>  |  21  ----------------------------<  120<H>  3.5   |  25  |  7.48<H>    Ca    8.9      07 Jul 2021 08:23  Phos  3.7     07-07  Mg     1.70     07-07    TPro  6.6  /  Alb  3.6  /  TBili  1.0  /  DBili  x   /  AST  11  /  ALT  7   /  AlkPhos  73  07-07    PT/INR - ( 07 Jul 2021 10:28 )   PT: 18.4 sec;   INR: 1.64 ratio         PTT - ( 07 Jul 2021 10:28 )  PTT:29.4 sec

## 2021-07-07 NOTE — CHART NOTE - NSCHARTNOTEFT_GEN_A_CORE
Called by RN for patient with rigors and noted temperature of 101.9F rectally. No additonal new complaints.  Blood culture x2, Urinalysis, urine culture, lactate, cmp, cbc ordered. Starting zosyn after cultures sent.  LEONA CERVANTES aware, agrees with plan. PA to call ID.

## 2021-07-07 NOTE — PROGRESS NOTE ADULT - SUBJECTIVE AND OBJECTIVE BOX
Patient is a 70y old  Male who presents with a chief complaint of Chest pain and GIB (05 Jul 2021 12:02)     .     HPI:    Subjective:    Events noted   ___________________________________________________________________________________________  Allergies    lisinopril (Other)  opioid-like analgesics (Other)    Intolerances      MEDICATIONS  (STANDING):  aMIOdarone    Tablet 200 milliGRAM(s) Oral daily  aspirin  chewable 81 milliGRAM(s) Oral daily  atorvastatin 40 milliGRAM(s) Oral at bedtime  chlorhexidine 2% Cloths 1 Application(s) Topical daily  dextrose 40% Gel 15 Gram(s) Oral once  dextrose 5%. 1000 milliLiter(s) (50 mL/Hr) IV Continuous <Continuous>  dextrose 5%. 1000 milliLiter(s) (100 mL/Hr) IV Continuous <Continuous>  dextrose 50% Injectable 25 Gram(s) IV Push once  dextrose 50% Injectable 12.5 Gram(s) IV Push once  dextrose 50% Injectable 25 Gram(s) IV Push once  epoetin danilo-epbx (RETACRIT) Injectable 87804 Unit(s) IV Push <User Schedule>  glucagon  Injectable 1 milliGRAM(s) IntraMuscular once  insulin lispro (ADMELOG) corrective regimen sliding scale   SubCutaneous three times a day before meals  insulin lispro (ADMELOG) corrective regimen sliding scale   SubCutaneous at bedtime  metoprolol tartrate 25 milliGRAM(s) Oral two times a day  pantoprazole  Injectable 40 milliGRAM(s) IV Push every 12 hours    MEDICATIONS  (PRN):  acetaminophen   Tablet .. 650 milliGRAM(s) Oral every 6 hours PRN Temp greater or equal to 38C (100.4F), Mild Pain (1 - 3)      PAST MEDICAL & SURGICAL HISTORY:  HTN (Hypertension)    DM (Diabetes Mellitus)  x 4 yrs without N/N/R    CAD (Coronary Artery Disease)    Hypercholesterolemia    Coronary Stent  CABG in 2019    Heart Attack  2/1/07 with subsequent stent    H/O: CVA  after cardiac stent placed 12/15/09 -no residual    Hyperthyroidism    Stented coronary artery  total 15 stents from [id number]    ESRD (end stage renal disease) on dialysis  MWF    Boil of Buttock  2006 and 2008    S/P cataract extraction    Hemodialysis access, AV graft  5/2015, L arm    S/P CABG x 4      FAMILY HISTORY:  Family history of diabetes mellitus (Sibling)    Family history of coronary artery disease      Social History: No hsitory of : Tobacco use, IVDA, EToH  ______________________________________________________________________________________    PHYSICAL EXAM    Daily     Daily   BMI: 30.7 (07-03 @ 19:45)  Change in Weight:  Vital Signs Last 24 Hrs  T(C): 37 (05 Jul 2021 12:57), Max: 37 (05 Jul 2021 12:57)  T(F): 98.6 (05 Jul 2021 12:57), Max: 98.6 (05 Jul 2021 12:57)  HR: 95 (05 Jul 2021 12:57) (89 - 97)  BP: 109/72 (05 Jul 2021 12:57) (87/51 - 109/72)  BP(mean): --  RR: 18 (05 Jul 2021 12:57) (16 - 18)  SpO2: 100% (05 Jul 2021 12:57) (98% - 100%)    General:  Well developed, well nourished, alert and active, no pallor, NAD.  HEENT:    Normal appearance of conjunctiva, ears, nose, lips, oropharynx, and oral mucosa, anicteric.  Neck:  No masses, no asymmetry.  Lymph Nodes:  No lymphadenopathy.   Cardiovascular:  RRR normal S1/S2, no murmur.  Respiratory:  CTA B/L, normal respiratory effort.   Abdominal:   soft, no masses or tenderness, normoactive BS, NT/ND, no HSM.  Extremities:   No clubbing or cyanosis, normal capillary refill, no edema.   Skin:   No rash, jaundice, lesions, eczema.   Musculoskeletal:  No joint swelling, erythema or tenderness.   Neuro: No focal deficits.   Other:   _______________________________________________________________________________________________  Lab Results:                          9.5    6.62  )-----------( 162      ( 05 Jul 2021 09:58 )             30.7     07-04    140  |  98  |  18  ----------------------------<  125<H>  3.4<L>   |  28  |  5.01<H>    Ca    8.9      04 Jul 2021 06:55  Phos  3.5     07-04  Mg     1.80     07-04    TPro  6.1  /  Alb  3.6  /  TBili  0.5  /  DBili  x   /  AST  13  /  ALT  7   /  AlkPhos  71  07-04    LIVER FUNCTIONS - ( 04 Jul 2021 06:55 )  Alb: 3.6 g/dL / Pro: 6.1 g/dL / ALK PHOS: 71 U/L / ALT: 7 U/L / AST: 13 U/L / GGT: x           PT/INR - ( 03 Jul 2021 21:15 )   PT: 16.1 sec;   INR: 1.44 ratio         PTT - ( 03 Jul 2021 21:15 )  PTT:27.7 sec    CARDIAC MARKERS ( 05 Jul 2021 09:58 )  x     / x     / 35 U/L / x     / 3.1 ng/mL  CARDIAC MARKERS ( 04 Jul 2021 18:16 )  x     / x     / x     / x     / 3.2 ng/mL      Stool Results:          RADIOLOGY RESULTS:    SURGICAL PATHOLOGY:

## 2021-07-07 NOTE — CONSULT NOTE ADULT - SUBJECTIVE AND OBJECTIVE BOX
HPI:  71 yo M with PMhx of CAD s/p CABG (course at that time complicated by pleural effusion requiring chest tubes), ESRD on HD (MWF), DMII, HTN, afib on coumadin, and anemia presented from [facility] for evaluation of chest pain and GIB. Patient is AAOX2 and was able to provide some information. Most of the information was obtained from charting. Patient was admitted to [facility] after developing chest pain during HD. He was found to have Hgb of 6 and elevated trops concerning for GIB and NSTEM II. He was give 2 units of pRBCs and monitored off warfarin/aspirin. He underwent bowel prep but it did not show any bloody stool. As a result, no colonoscopy was performed. His course was complicated by aflutter/afib with RVR. He was managed with metoprolol and never required cardioversion. On day of transfer, his Hgb was in the 8s. Patient was deemed stable enough to be restarted on aspirin and warfarin. Patient was transferred to [facility] for further evaluation. Currently, patient have no symptoms but reports to have intermittent chest pain, that is suprasternal, non-radiating, exertional at times, and non-reproducible on palpation. it improves with rest and worsens with movement. He denies any fever, chills, cough, orthopnea, PND, LE edema, abdominal pain, diarrhea, or dysuria.    On the floor, vitals are WNL.  (03 Jul 2021 21:11)      PAST MEDICAL & SURGICAL HISTORY:  HTN (Hypertension)    DM (Diabetes Mellitus)  x 4 yrs without N/N/R    CAD (Coronary Artery Disease)    Hypercholesterolemia    Coronary Stent  CABG in 2019    Heart Attack  2/1/07 with subsequent stent    H/O: CVA  after cardiac stent placed 12/15/09 -no residual    Hyperthyroidism    Stented coronary artery  total 15 stents from [id number]    ESRD (end stage renal disease) on dialysis  MWF    Boil of Buttock  2006 and 2008    S/P cataract extraction    Hemodialysis access, AV graft  5/2015, L arm    S/P CABG x 4        lisinopril (Other)  opioid-like analgesics (Other)      Social Hx:    FAMILY HISTORY:  Family history of diabetes mellitus (Sibling)    Family history of coronary artery disease        acetaminophen   Tablet .. 650 milliGRAM(s) Oral every 6 hours PRN  aMIOdarone    Tablet 200 milliGRAM(s) Oral daily  aspirin  chewable 81 milliGRAM(s) Oral daily  atorvastatin 40 milliGRAM(s) Oral at bedtime  chlorhexidine 2% Cloths 1 Application(s) Topical daily  dextrose 40% Gel 15 Gram(s) Oral once  dextrose 5%. 1000 milliLiter(s) IV Continuous <Continuous>  dextrose 5%. 1000 milliLiter(s) IV Continuous <Continuous>  dextrose 50% Injectable 25 Gram(s) IV Push once  dextrose 50% Injectable 12.5 Gram(s) IV Push once  dextrose 50% Injectable 25 Gram(s) IV Push once  epoetin danilo-epbx (RETACRIT) Injectable 97380 Unit(s) IV Push <User Schedule>  glucagon  Injectable 1 milliGRAM(s) IntraMuscular once  insulin lispro (ADMELOG) corrective regimen sliding scale   SubCutaneous three times a day before meals  insulin lispro (ADMELOG) corrective regimen sliding scale   SubCutaneous at bedtime  metoprolol tartrate 25 milliGRAM(s) Oral two times a day  mupirocin 2% Ointment 1 Application(s) Topical two times a day  pantoprazole  Injectable 40 milliGRAM(s) IV Push every 12 hours      MEDICATIONS  (PRN):  acetaminophen   Tablet .. 650 milliGRAM(s) Oral every 6 hours PRN Temp greater or equal to 38C (100.4F), Mild Pain (1 - 3)      T(C): 36.9 (07-07-21 @ 15:00), Max: 37.7 (07-06-21 @ 17:48)  HR: 89 (07-07-21 @ 15:00) (70 - 96)  BP: 107/62 (07-07-21 @ 15:00) (95/53 - 124/80)  RR: 18 (07-07-21 @ 15:00) (18 - 19)  SpO2: 94% (07-07-21 @ 05:30) (92% - 96%)        REVIEW OF SYSTEMS:                           ALL ROS DONE [ X   ]    CONSTITUTIONAL:  LETHARGIC [   ], FEVER [   ], UNRESPONSIVE [   ]  CVS:  CP  [   ], SOB, [   ], PALPITATIONS [   ], DIZZYNESS [   ]  RS: COUGH [   ], SPUTUM [   ]  GI: ABDOMINAL PAIN [   ], NAUSEA [   ], VOMITINGS [   ], DIARRHEA [   ], CONSTIPATION [   ]  :  DYSURIA [   ], NOCTURIA [   ], INCREASED FREQUENCY [   ], DRIBLING [   ],  SKELETAL: PAINFUL JOINTS [   ], SWOLLEN JOINTS [   ], NECK ACHE [   ], LOW BACK ACHE [   ],  SKIN : ULCERS [   ], RASH [   ], ITCHING [   ]  CNS: HEAD ACHE [   ], DOUBLE VISION [   ], BLURRED VISION [   ], AMS / CONFUSION [   ], SEIZURES [   ], WEAKNESS [   ],TINGLING / NUMBNESS [   ]    PHYSICAL EXAMINATION:  GENERAL APPEARANCE: NO DISTRESS  HEENT:  NO PALLOR, NO  JVD,  NO   NODES, NECK SUPPLE  CVS: S1 +, S2 +,   RS: AEEB,  OCCASIONAL  RALES +,   NO RONCHI  ABD: SOFT, NT, NO, BS +  EXT: NO PE  SKIN: WARM,   SKELETAL:  ROM ACCEPTABLE  CNS:  AAO X    ,   DEFICITS    RADIOLOGY :      ASSESSMENT :       PLAN:  HPI:  71 yo M with PMhx of CAD s/p CABG (course at that time complicated by pleural effusion requiring chest tubes), ESRD on HD (MWF), DMII, HTN, afib on coumadin, and anemia presented from [facility] for evaluation of chest pain and GIB. Patient is AAOX2 and was able to provide some information. Most of the information was obtained from charting. Patient was admitted to [facility] after developing chest pain during HD. He was found to have Hgb of 6 and elevated trops concerning for GIB and NSTEM II. He was give 2 units of pRBCs and monitored off warfarin/aspirin. He underwent bowel prep but it did not show any bloody stool. As a result, no colonoscopy was performed. His course was complicated by aflutter/afib with RVR. He was managed with metoprolol and never required cardioversion. On day of transfer, his Hgb was in the 8s. Patient was deemed stable enough to be restarted on aspirin and warfarin. Patient was transferred to [facility] for further evaluation. Currently, patient have no symptoms but reports to have intermittent chest pain, that is suprasternal, non-radiating, exertional at times, and non-reproducible on palpation. it improves with rest and worsens with movement. He denies any fever, chills, cough, orthopnea, PND, LE edema, abdominal pain, diarrhea, or dysuria.    On the floor, vitals are WNL.  (03 Jul 2021 21:11)    -      HPI:  71 yo M with PMhx of CAD s/p CABG (course at that time complicated by pleural effusion requiring chest tubes), ESRD on HD (MWF), DMII, HTN, afib on coumadin, and anemia presented from [facility] for evaluation of chest pain and GIB. Patient is AAOX2 and was able to provide some information. Most of the information was obtained from charting. Patient was admitted to [facility] after developing chest pain during HD. He was found to have Hgb of 6 and elevated trops concerning for GIB and NSTEM II. He was give 2 units of pRBCs and monitored off warfarin/aspirin. He underwent bowel prep but it did not show any bloody stool. As a result, no colonoscopy was performed. His course was complicated by aflutter/afib with RVR. He was managed with metoprolol and never required cardioversion. On day of transfer, his Hgb was in the 8s. Patient was deemed stable enough to be restarted on aspirin and warfarin. Patient was transferred to [facility] for further evaluation. Currently, patient have no symptoms but reports to have intermittent chest pain, that is suprasternal, non-radiating, exertional at times, and non-reproducible on palpation. it improves with rest and worsens with movement. He denies any fever, chills, cough, orthopnea, PND, LE edema, abdominal pain, diarrhea, or dysuria.    On the floor, vitals are WNL.  (03 Jul 2021 21:11)      PAST MEDICAL & SURGICAL HISTORY:  HTN (Hypertension)    DM (Diabetes Mellitus)  x 4 yrs without N/N/R    CAD (Coronary Artery Disease)    Hypercholesterolemia    Coronary Stent  CABG in 2019    Heart Attack  2/1/07 with subsequent stent    H/O: CVA  after cardiac stent placed 12/15/09 -no residual    Hyperthyroidism    Stented coronary artery  total 15 stents from [id number]    ESRD (end stage renal disease) on dialysis  MWF    Boil of Buttock  2006 and 2008    S/P cataract extraction    Hemodialysis access, AV graft  5/2015, L arm    S/P CABG x 4        lisinopril (Other)  opioid-like analgesics (Other)      Social Hx: nonsmoker    FAMILY HISTORY:  Family history of diabetes mellitus (Sibling)    Family history of coronary artery disease        acetaminophen   Tablet .. 650 milliGRAM(s) Oral every 6 hours PRN  aMIOdarone    Tablet 200 milliGRAM(s) Oral daily  aspirin  chewable 81 milliGRAM(s) Oral daily  atorvastatin 40 milliGRAM(s) Oral at bedtime  chlorhexidine 2% Cloths 1 Application(s) Topical daily  dextrose 40% Gel 15 Gram(s) Oral once  dextrose 5%. 1000 milliLiter(s) IV Continuous <Continuous>  dextrose 5%. 1000 milliLiter(s) IV Continuous <Continuous>  dextrose 50% Injectable 25 Gram(s) IV Push once  dextrose 50% Injectable 12.5 Gram(s) IV Push once  dextrose 50% Injectable 25 Gram(s) IV Push once  epoetin danilo-epbx (RETACRIT) Injectable 76464 Unit(s) IV Push <User Schedule>  glucagon  Injectable 1 milliGRAM(s) IntraMuscular once  insulin lispro (ADMELOG) corrective regimen sliding scale   SubCutaneous three times a day before meals  insulin lispro (ADMELOG) corrective regimen sliding scale   SubCutaneous at bedtime  metoprolol tartrate 25 milliGRAM(s) Oral two times a day  mupirocin 2% Ointment 1 Application(s) Topical two times a day  pantoprazole  Injectable 40 milliGRAM(s) IV Push every 12 hours      MEDICATIONS  (PRN):  acetaminophen   Tablet .. 650 milliGRAM(s) Oral every 6 hours PRN Temp greater or equal to 38C (100.4F), Mild Pain (1 - 3)      T(C): 36.9 (07-07-21 @ 15:00), Max: 37.7 (07-06-21 @ 17:48)  HR: 89 (07-07-21 @ 15:00) (70 - 96)  BP: 107/62 (07-07-21 @ 15:00) (95/53 - 124/80)  RR: 18 (07-07-21 @ 15:00) (18 - 19)  SpO2: 94% (07-07-21 @ 05:30) (92% - 96%)        REVIEW OF SYSTEMS:                           ALL ROS DONE [ X   ]    CONSTITUTIONAL:  LETHARGIC [   ], FEVER [   ], UNRESPONSIVE [   ]  CVS:  CP  [   ], SOB, [   ], PALPITATIONS [   ], DIZZYNESS [   ]  RS: COUGH [   ], SPUTUM [   ]  GI: ABDOMINAL PAIN [   ], NAUSEA [   ], VOMITINGS [   ], DIARRHEA [   ], CONSTIPATION [   ]  :  DYSURIA [   ], NOCTURIA [   ], INCREASED FREQUENCY [   ], DRIBLING [   ],  SKELETAL: PAINFUL JOINTS [   ], SWOLLEN JOINTS [   ], NECK ACHE [   ], LOW BACK ACHE [   ],  SKIN : ULCERS [   ], RASH [   ], ITCHING [   ]  CNS: HEAD ACHE [   ], DOUBLE VISION [   ], BLURRED VISION [   ], AMS / CONFUSION [   ], SEIZURES [   ], WEAKNESS [   ],TINGLING / NUMBNESS [   ]    PHYSICAL EXAMINATION:  GENERAL APPEARANCE: NO DISTRESS  HEENT:  NO PALLOR, NO  JVD,  NO   NODES, NECK SUPPLE  CVS: S1 +, S2 +,   RS: AEEB,  OCCASIONAL  RALES +,    B/L OCCASIONAL RHONCHI+  ABD: SOFT, NT, NO, BS +  EXT: NO PE  SKIN: WARM,    LUE AVF +  SKELETAL:  ROM ACCEPTABLE  CNS:  AAO X  2 , NO   DEFICITS    RADIOLOGY :    EXAM:  CT CHEST        PROCEDURE DATE:  Jul 6 2021         INTERPRETATION:  CLINICAL INFORMATION: Shortness of breath, evaluate pleural effusion    COMPARISON: CT chest 7/6/2020, chest x-ray 7/4/2021.    CONTRAST/COMPLICATIONS:  None    FINDINGS:    Hypoattenuating nodules in the right thyroid gland with calcification. The chest wall is normal. There is no mediastinal, hilar, or axillary lymphadenopathy.    The heart size is enlarged. No pericardial effusion. Coronary artery calcification. Status post CABG. Aortic valve calcification.    Calcification of the aorta and its branches. Left subclavian vein stent.    Moderate loculated left pleural effusion with near complete atelectasis of the left lower lobe. Subsegmental atelectasis in the right lower lobe.    Below the diaphragm, small perihepatic ascites is noted.    Degenerative changes of the spine. Status post median sternotomy.    IMPRESSION:  Moderate loculated left pleural effusion with near complete atelectasis of the left lowerlobe.    Small perihepatic ascites.      ASSESSMENT :       PLAN:  HPI:  71 yo M with PMhx of CAD s/p CABG (course at that time complicated by pleural effusion requiring chest tubes), ESRD on HD (MWF), DMII, HTN, afib on coumadin, and anemia presented from [facility] for evaluation of chest pain and GIB. Patient is AAOX2 and was able to provide some information. Most of the information was obtained from charting. Patient was admitted to [facility] after developing chest pain during HD. He was found to have Hgb of 6 and elevated trops concerning for GIB and NSTEM II. He was give 2 units of pRBCs and monitored off warfarin/aspirin. He underwent bowel prep but it did not show any bloody stool. As a result, no colonoscopy was performed. His course was complicated by aflutter/afib with RVR. He was managed with metoprolol and never required cardioversion. On day of transfer, his Hgb was in the 8s. Patient was deemed stable enough to be restarted on aspirin and warfarin. Patient was transferred to [facility] for further evaluation. Currently, patient have no symptoms but reports to have intermittent chest pain, that is suprasternal, non-radiating, exertional at times, and non-reproducible on palpation. it improves with rest and worsens with movement. He denies any fever, chills, cough, orthopnea, PND, LE edema, abdominal pain, diarrhea, or dysuria.    On the floor, vitals are WNL.  (03 Jul 2021 21:11)    # HYPOTENSION - STARTED ON VANCOMYCIN, F/U BCX, F/U LACTIC ACID, GIVEN SMALL BOLUS OF IVF, PER MICU RECOMMENDATION - STARTED ON MIDODRINE  - MICU CONSULT PLACED  # ELEVATED TROPONIN, CHEST PAIN - TRENDED TROPONINS, ON TELEMETRY, CARDIOLOGY CONSULT IN PROGRESS  - TTE - MINIMAL MR, NORMAL LV SYSTOLIC FUNCTION, DECREASED RV SYSTOLIC FUNCTION W/ RVE  # SUSPECTED GI BLEED, NORMOCYTIC ANEMIA - ? AOCKD, FOBT NEGATIVE, F/U ANEMIA PANEL  - GI CONSULT IN PROGRESS  # PLEURAL EFFUSION - CT CHEST W/ MODERATE LOCULATED LEFT PLEURAL EFFUSION - PULM CONSULT IN PROGRESS, CT SURGERY CONSULT IN PROGRESS  - MAY NEED PLEURAL BIOPSY, EFFUSION DRAINAGE AND DECORTICATION  # AFIB ON COUMADIN  # CAD S/P CABG  # ESRD ON HD M,W,F  # T2DM  # HTN  # GI PPX

## 2021-07-07 NOTE — CONSULT NOTE ADULT - ASSESSMENT
69 yo M with PMhx of CAD s/p CABG (course at that time complicated by pleural effusion requiring chest tubes), ESRD on HD (MWF), DMII, HTN, afib on coumadin, and anemia presented from [facility] for evaluation of chest pain and GIB. Pt later found to have loculated left pleural effusion on CT chest, now pending possible drainage. MICU was consulted for hypotension to 80/51.    Pt s/p 500 cc bolus NS and midodrine 20 mg x1. Manual BP improved to 102/60 following interventions. Pt is mentating, currently asymptomatic, skin is warm and dry, and he appears euvolemic. Lactate noted to be elevated to 2.7. S/p HD today with 1.5 L removed. Stool has been non-bloody. Hypotension likely multifactorial from recent HD and worsening loculated pleural effusion.    Recommendations:  - Would continue midodrine 20 mg TID (hold for HR <60), and wean as tolerated  - Agree with mIVF @ 50 cc/hr for 10 hours. No need for further boluses of fluid  - Would add vancomycin given fever, hypotension, and previous reports of rigors and check vanc by level. Pt at higher risk for MRSA infection. Ideally pleural fluid should be sent for culture and sensitivities.  - Check lactate with morning labs  - Can continue lopressor with hold parameters

## 2021-07-08 ENCOUNTER — RESULT REVIEW (OUTPATIENT)
Age: 71
End: 2021-07-08

## 2021-07-08 LAB
ALBUMIN SERPL ELPH-MCNC: 3.5 G/DL — SIGNIFICANT CHANGE UP (ref 3.3–5)
ALBUMIN SERPL ELPH-MCNC: SIGNIFICANT CHANGE UP G/DL (ref 3.3–5)
ALP SERPL-CCNC: 71 U/L — SIGNIFICANT CHANGE UP (ref 40–120)
ALP SERPL-CCNC: SIGNIFICANT CHANGE UP U/L (ref 40–120)
ALT FLD-CCNC: 7 U/L — SIGNIFICANT CHANGE UP (ref 4–41)
ALT FLD-CCNC: SIGNIFICANT CHANGE UP U/L (ref 4–41)
ANION GAP SERPL CALC-SCNC: 15 MMOL/L — HIGH (ref 7–14)
APTT BLD: 28.7 SEC — SIGNIFICANT CHANGE UP (ref 27–36.3)
AST SERPL-CCNC: 13 U/L — SIGNIFICANT CHANGE UP (ref 4–40)
AST SERPL-CCNC: SIGNIFICANT CHANGE UP U/L (ref 4–40)
B PERT IGG+IGM PNL SER: CLEAR — SIGNIFICANT CHANGE UP
BASE EXCESS BLDV CALC-SCNC: 4 MMOL/L — HIGH (ref -3–2)
BILIRUB SERPL-MCNC: 0.9 MG/DL — SIGNIFICANT CHANGE UP (ref 0.2–1.2)
BILIRUB SERPL-MCNC: SIGNIFICANT CHANGE UP MG/DL (ref 0.2–1.2)
BUN SERPL-MCNC: 15 MG/DL — SIGNIFICANT CHANGE UP (ref 7–23)
BUN SERPL-MCNC: SIGNIFICANT CHANGE UP MG/DL (ref 7–23)
CALCIUM SERPL-MCNC: 8.7 MG/DL — SIGNIFICANT CHANGE UP (ref 8.4–10.5)
CALCIUM SERPL-MCNC: SIGNIFICANT CHANGE UP MG/DL (ref 8.4–10.5)
CHLORIDE SERPL-SCNC: 95 MMOL/L — LOW (ref 98–107)
CHLORIDE SERPL-SCNC: SIGNIFICANT CHANGE UP MMOL/L (ref 98–107)
CK SERPL-CCNC: 49 U/L — SIGNIFICANT CHANGE UP (ref 30–200)
CO2 SERPL-SCNC: 24 MMOL/L — SIGNIFICANT CHANGE UP (ref 22–31)
CO2 SERPL-SCNC: SIGNIFICANT CHANGE UP MMOL/L (ref 22–31)
COLOR FLD: YELLOW
CREAT SERPL-MCNC: 6.1 MG/DL — HIGH (ref 0.5–1.3)
CREAT SERPL-MCNC: SIGNIFICANT CHANGE UP MG/DL (ref 0.5–1.3)
FLUID INTAKE SUBSTANCE CLASS: SIGNIFICANT CHANGE UP
FLUID SEGMENTED GRANULOCYTES: 1 % — SIGNIFICANT CHANGE UP
GLUCOSE BLDC GLUCOMTR-MCNC: 138 MG/DL — HIGH (ref 70–99)
GLUCOSE BLDC GLUCOMTR-MCNC: 143 MG/DL — HIGH (ref 70–99)
GLUCOSE BLDC GLUCOMTR-MCNC: 169 MG/DL — HIGH (ref 70–99)
GLUCOSE BLDC GLUCOMTR-MCNC: 197 MG/DL — HIGH (ref 70–99)
GLUCOSE BLDC GLUCOMTR-MCNC: 198 MG/DL — HIGH (ref 70–99)
GLUCOSE BLDC GLUCOMTR-MCNC: 214 MG/DL — HIGH (ref 70–99)
GLUCOSE FLD-MCNC: 167 MG/DL — SIGNIFICANT CHANGE UP
GLUCOSE SERPL-MCNC: 186 MG/DL — HIGH (ref 70–99)
GLUCOSE SERPL-MCNC: SIGNIFICANT CHANGE UP MG/DL (ref 70–99)
GRAM STN FLD: SIGNIFICANT CHANGE UP
HCO3 BLDV-SCNC: 26 MMOL/L — SIGNIFICANT CHANGE UP (ref 20–27)
HCT VFR BLD CALC: 34.7 % — LOW (ref 39–50)
HGB BLD-MCNC: 10.6 G/DL — LOW (ref 13–17)
INR BLD: 1.72 RATIO — HIGH (ref 0.88–1.16)
LACTATE SERPL-SCNC: 1.1 MMOL/L — SIGNIFICANT CHANGE UP (ref 0.5–2)
LACTATE SERPL-SCNC: 2.4 MMOL/L — HIGH (ref 0.5–2)
LDH SERPL L TO P-CCNC: 195 U/L — SIGNIFICANT CHANGE UP (ref 135–225)
LDH SERPL L TO P-CCNC: 67 U/L — SIGNIFICANT CHANGE UP
LYMPHOCYTES # FLD: 15 % — SIGNIFICANT CHANGE UP
MAGNESIUM SERPL-MCNC: 1.7 MG/DL — SIGNIFICANT CHANGE UP (ref 1.6–2.6)
MAGNESIUM SERPL-MCNC: SIGNIFICANT CHANGE UP MG/DL (ref 1.6–2.6)
MCHC RBC-ENTMCNC: 28.6 PG — SIGNIFICANT CHANGE UP (ref 27–34)
MCHC RBC-ENTMCNC: 30.5 GM/DL — LOW (ref 32–36)
MCV RBC AUTO: 93.8 FL — SIGNIFICANT CHANGE UP (ref 80–100)
METHOD TYPE: SIGNIFICANT CHANGE UP
MONOS+MACROS # FLD: 84 % — SIGNIFICANT CHANGE UP
MSSA DNA SPEC QL NAA+PROBE: SIGNIFICANT CHANGE UP
NRBC # BLD: 0 /100 WBCS — SIGNIFICANT CHANGE UP
NRBC # FLD: 0 K/UL — SIGNIFICANT CHANGE UP
PCO2 BLDV: 50 MMHG — SIGNIFICANT CHANGE UP (ref 41–51)
PH BLDV: 7.38 — SIGNIFICANT CHANGE UP (ref 7.32–7.43)
PHOSPHATE SERPL-MCNC: 3.4 MG/DL — SIGNIFICANT CHANGE UP (ref 2.5–4.5)
PHOSPHATE SERPL-MCNC: SIGNIFICANT CHANGE UP MG/DL (ref 2.5–4.5)
PLATELET # BLD AUTO: 171 K/UL — SIGNIFICANT CHANGE UP (ref 150–400)
PO2 BLDV: <24 MMHG — LOW (ref 35–40)
POTASSIUM SERPL-MCNC: 3.9 MMOL/L — SIGNIFICANT CHANGE UP (ref 3.5–5.3)
POTASSIUM SERPL-MCNC: SIGNIFICANT CHANGE UP MMOL/L (ref 3.5–5.3)
POTASSIUM SERPL-SCNC: 3.9 MMOL/L — SIGNIFICANT CHANGE UP (ref 3.5–5.3)
POTASSIUM SERPL-SCNC: SIGNIFICANT CHANGE UP MMOL/L (ref 3.5–5.3)
PROT FLD-MCNC: 3.2 G/DL — SIGNIFICANT CHANGE UP
PROT SERPL-MCNC: 6.5 G/DL — SIGNIFICANT CHANGE UP (ref 6–8.3)
PROT SERPL-MCNC: 6.5 G/DL — SIGNIFICANT CHANGE UP (ref 6–8.3)
PROT SERPL-MCNC: SIGNIFICANT CHANGE UP G/DL (ref 6–8.3)
PROTHROM AB SERPL-ACNC: 19.1 SEC — HIGH (ref 10.6–13.6)
RBC # BLD: 3.7 M/UL — LOW (ref 4.2–5.8)
RBC # FLD: 17.4 % — HIGH (ref 10.3–14.5)
RCV VOL RI: <1000 CELLS/UL — SIGNIFICANT CHANGE UP (ref 0–5)
SAO2 % BLDV: 14.2 % — LOW (ref 60–85)
SODIUM SERPL-SCNC: 134 MMOL/L — LOW (ref 135–145)
SODIUM SERPL-SCNC: SIGNIFICANT CHANGE UP MMOL/L (ref 135–145)
SPECIMEN SOURCE FLD: SIGNIFICANT CHANGE UP
SPECIMEN SOURCE: SIGNIFICANT CHANGE UP
TOTAL NUCLEATED CELL COUNT, BODY FLUID: 148 CELLS/UL — HIGH (ref 0–5)
TROPONIN T, HIGH SENSITIVITY RESULT: 181 NG/L — CRITICAL HIGH
TROPONIN T, HIGH SENSITIVITY RESULT: SIGNIFICANT CHANGE UP NG/L
TUBE TYPE: SIGNIFICANT CHANGE UP
WBC # BLD: 11.98 K/UL — HIGH (ref 3.8–10.5)
WBC # FLD AUTO: 11.98 K/UL — HIGH (ref 3.8–10.5)

## 2021-07-08 PROCEDURE — 71045 X-RAY EXAM CHEST 1 VIEW: CPT | Mod: 26

## 2021-07-08 PROCEDURE — 32557 INSERT CATH PLEURA W/ IMAGE: CPT

## 2021-07-08 PROCEDURE — 88112 CYTOPATH CELL ENHANCE TECH: CPT | Mod: 26

## 2021-07-08 PROCEDURE — 93010 ELECTROCARDIOGRAM REPORT: CPT

## 2021-07-08 PROCEDURE — 73110 X-RAY EXAM OF WRIST: CPT | Mod: 26,RT

## 2021-07-08 PROCEDURE — 99291 CRITICAL CARE FIRST HOUR: CPT

## 2021-07-08 PROCEDURE — 88305 TISSUE EXAM BY PATHOLOGIST: CPT | Mod: 26

## 2021-07-08 PROCEDURE — 88108 CYTOPATH CONCENTRATE TECH: CPT | Mod: 26,59

## 2021-07-08 PROCEDURE — 99223 1ST HOSP IP/OBS HIGH 75: CPT

## 2021-07-08 RX ORDER — ACETAMINOPHEN 500 MG
1000 TABLET ORAL EVERY 6 HOURS
Refills: 0 | Status: DISCONTINUED | OUTPATIENT
Start: 2021-07-08 | End: 2021-07-21

## 2021-07-08 RX ORDER — SODIUM CHLORIDE 9 MG/ML
250 INJECTION INTRAMUSCULAR; INTRAVENOUS; SUBCUTANEOUS ONCE
Refills: 0 | Status: COMPLETED | OUTPATIENT
Start: 2021-07-08 | End: 2021-07-08

## 2021-07-08 RX ORDER — CALAMINE AND ZINC OXIDE AND PHENOL 160; 10 MG/ML; MG/ML
1 LOTION TOPICAL THREE TIMES A DAY
Refills: 0 | Status: DISCONTINUED | OUTPATIENT
Start: 2021-07-08 | End: 2021-08-09

## 2021-07-08 RX ORDER — DIPHENHYDRAMINE HCL 50 MG
25 CAPSULE ORAL ONCE
Refills: 0 | Status: COMPLETED | OUTPATIENT
Start: 2021-07-08 | End: 2021-07-08

## 2021-07-08 RX ORDER — CHLORHEXIDINE GLUCONATE 213 G/1000ML
1 SOLUTION TOPICAL
Refills: 0 | Status: DISCONTINUED | OUTPATIENT
Start: 2021-07-08 | End: 2021-07-09

## 2021-07-08 RX ORDER — MIDODRINE HYDROCHLORIDE 2.5 MG/1
10 TABLET ORAL ONCE
Refills: 0 | Status: COMPLETED | OUTPATIENT
Start: 2021-07-08 | End: 2021-07-08

## 2021-07-08 RX ORDER — NOREPINEPHRINE BITARTRATE/D5W 8 MG/250ML
0.25 PLASTIC BAG, INJECTION (ML) INTRAVENOUS
Qty: 8 | Refills: 0 | Status: DISCONTINUED | OUTPATIENT
Start: 2021-07-08 | End: 2021-07-14

## 2021-07-08 RX ORDER — ACETAMINOPHEN 500 MG
1000 TABLET ORAL ONCE
Refills: 0 | Status: COMPLETED | OUTPATIENT
Start: 2021-07-08 | End: 2021-07-08

## 2021-07-08 RX ORDER — ONDANSETRON 8 MG/1
4 TABLET, FILM COATED ORAL ONCE
Refills: 0 | Status: COMPLETED | OUTPATIENT
Start: 2021-07-08 | End: 2021-07-08

## 2021-07-08 RX ORDER — HEPARIN SODIUM 5000 [USP'U]/ML
5000 INJECTION INTRAVENOUS; SUBCUTANEOUS EVERY 8 HOURS
Refills: 0 | Status: DISCONTINUED | OUTPATIENT
Start: 2021-07-08 | End: 2021-07-10

## 2021-07-08 RX ORDER — MAGNESIUM SULFATE 500 MG/ML
2 VIAL (ML) INJECTION ONCE
Refills: 0 | Status: COMPLETED | OUTPATIENT
Start: 2021-07-08 | End: 2021-07-08

## 2021-07-08 RX ADMIN — AMIODARONE HYDROCHLORIDE 200 MILLIGRAM(S): 400 TABLET ORAL at 06:18

## 2021-07-08 RX ADMIN — Medication 1: at 12:38

## 2021-07-08 RX ADMIN — Medication 1000 MILLIGRAM(S): at 00:00

## 2021-07-08 RX ADMIN — MIDODRINE HYDROCHLORIDE 10 MILLIGRAM(S): 2.5 TABLET ORAL at 00:37

## 2021-07-08 RX ADMIN — PANTOPRAZOLE SODIUM 40 MILLIGRAM(S): 20 TABLET, DELAYED RELEASE ORAL at 17:24

## 2021-07-08 RX ADMIN — HEPARIN SODIUM 5000 UNIT(S): 5000 INJECTION INTRAVENOUS; SUBCUTANEOUS at 21:53

## 2021-07-08 RX ADMIN — Medication 81 MILLIGRAM(S): at 13:07

## 2021-07-08 RX ADMIN — CHLORHEXIDINE GLUCONATE 1 APPLICATION(S): 213 SOLUTION TOPICAL at 13:07

## 2021-07-08 RX ADMIN — MUPIROCIN 1 APPLICATION(S): 20 OINTMENT TOPICAL at 18:09

## 2021-07-08 RX ADMIN — PANTOPRAZOLE SODIUM 40 MILLIGRAM(S): 20 TABLET, DELAYED RELEASE ORAL at 06:06

## 2021-07-08 RX ADMIN — SODIUM CHLORIDE 250 MILLILITER(S): 9 INJECTION INTRAMUSCULAR; INTRAVENOUS; SUBCUTANEOUS at 12:59

## 2021-07-08 RX ADMIN — ONDANSETRON 4 MILLIGRAM(S): 8 TABLET, FILM COATED ORAL at 13:07

## 2021-07-08 RX ADMIN — MUPIROCIN 1 APPLICATION(S): 20 OINTMENT TOPICAL at 06:15

## 2021-07-08 RX ADMIN — Medication 25 MILLIGRAM(S): at 00:37

## 2021-07-08 RX ADMIN — Medication 250 MILLIGRAM(S): at 00:08

## 2021-07-08 RX ADMIN — ATORVASTATIN CALCIUM 40 MILLIGRAM(S): 80 TABLET, FILM COATED ORAL at 21:53

## 2021-07-08 RX ADMIN — PIPERACILLIN AND TAZOBACTAM 25 GRAM(S): 4; .5 INJECTION, POWDER, LYOPHILIZED, FOR SOLUTION INTRAVENOUS at 04:13

## 2021-07-08 RX ADMIN — PIPERACILLIN AND TAZOBACTAM 25 GRAM(S): 4; .5 INJECTION, POWDER, LYOPHILIZED, FOR SOLUTION INTRAVENOUS at 16:25

## 2021-07-08 RX ADMIN — Medication 50 GRAM(S): at 17:23

## 2021-07-08 RX ADMIN — Medication: at 19:50

## 2021-07-08 RX ADMIN — Medication 400 MILLIGRAM(S): at 15:00

## 2021-07-08 RX ADMIN — MIDODRINE HYDROCHLORIDE 20 MILLIGRAM(S): 2.5 TABLET ORAL at 13:07

## 2021-07-08 RX ADMIN — Medication 40.4 MICROGRAM(S)/KG/MIN: at 19:27

## 2021-07-08 NOTE — CHART NOTE - NSCHARTNOTEFT_GEN_A_CORE
MICU Accept Note    CHIEF COMPLAINT:     HPI / INTERVAL HISTORY:    71 yo M with PMhx of CAD s/p CABG (course at that time complicated by pleural effusion requiring chest tubes), ESRD on HD (MWF), DMII, HTN, afib on coumadin, and anemia presented from [facility] for evaluation of chest pain and GIB. Patient is AAOX2 and was able to provide some information. Most of the information was obtained from charting. Patient was admitted to [facility] after developing chest pain during HD. He was found to have Hgb of 6 and elevated trops concerning for GIB and NSTEM II. He was given 2 units of pRBCs and monitored off warfarin/aspirin. He underwent bowel prep but it did not show any bloody stool. As a result, no colonoscopy was performed. His course was complicated by aflutter/afib with RVR. He was managed with metoprolol and never required cardioversion. On day of transfer, his Hgb was in the 8s. Patient was deemed stable enough to be restarted on aspirin and warfarin. Patient was transferred to [facility] for further evaluation. Currently, patient have no symptoms but reports to have intermittent chest pain, that is suprasternal, non-radiating, exertional at times, and non-reproducible on palpation. it improves with rest and worsens with movement. He denies any fever, chills, cough, orthopnea, PND, LE edema, abdominal pain, diarrhea, or dysuria.  On the floor, vitals are WNL.     MICU was consulted yesterday due to hypotension to 80/51. Patient was asymptomatic, denying f/c, n/v, dizziness, HA, CP, SOB at rest, abd pain. Midodrine 30 mg was recommended. He received 20mg at 21:00 and 10mg at 00:00am. Today, the patient continued to be hypotensive, chest tubes were place for loculated pleural effusion and Midodrine was given. At that time the patient became bradycardic and hypotensive. He was given atropine and froilan IVP. Levophed was started for BP support and he was transferred to the MICU for further management and monitoring.       PAST MEDICAL & SURGICAL HISTORY:  HTN (Hypertension)    DM (Diabetes Mellitus)  x 4 yrs without N/N/R    CAD (Coronary Artery Disease)  Hypercholesterolemia  Coronary Stent  CABG in 2019  Heart Attack  2/1/07 with subsequent stent    H/O: CVA  after cardiac stent placed 12/15/09 -no residual    Hyperthyroidism    Stented coronary artery  total 15 stents from [id number]    ESRD (end stage renal disease) on dialysis  MWF    Boil of Buttock  2006 and 2008    S/P cataract extraction    Hemodialysis access, AV graft  5/2015, L arm    S/P CABG x 4        FAMILY HISTORY:  Family history of diabetes mellitus (Sibling)  Family history of coronary artery disease      SOCIAL HISTORY:  Smoking:   Substance Use:   EtOH Use:   Marital Status:   Sexual History:   Occupation:  Recent Travel:  Country of Birth:   Advance Directives:     HOME MEDICATIONS:      Allergies    lisinopril (Other)  opioid-like analgesics (Other)    Intolerances          REVIEW OF SYSTEMS:  Constitutional: No fevers, chills, weight loss, weight gain  HEENT: No vision problems, eye pain, nasal congestion, rhinorrhea, sore throat, dysphagia  CV: No chest pain, orthopnea, palpitations  Resp: No cough, dyspnea, wheezing, hemoptysis  GI: No nausea, vomiting, diarrhea, constipation, abdominal pain  : [ ] dysuria [ ] nocturia [ ] hematuria [ ] increased urinary frequency  Musculoskeletal: [ ] back pain [ ] myalgias [ ] arthralgias [ ] fracture  Skin: [ ] rash [ ] itch  Neurological: [ ] headache [ ] dizziness [ ] syncope [ ] weakness [ ] numbness  Psychiatric: [ ] anxiety [ ] depression  Endocrine: [ ] diabetes [ ] thyroid problem  Hematologic/Lymphatic: [ ] anemia [ ] bleeding problem  Allergic/Immunologic: [ ] itchy eyes [ ] nasal discharge [ ] hives [ ] angioedema  [ ] All other systems negative  [ ] Unable to assess ROS because ________    OBJECTIVE:  ICU Vital Signs Last 24 Hrs  T(C): 36.7 (08 Jul 2021 12:00), Max: 38.8 (07 Jul 2021 17:41)  T(F): 98 (08 Jul 2021 12:00), Max: 101.9 (07 Jul 2021 17:41)  HR: 68 (08 Jul 2021 12:50) (68 - 113)  BP: 77/45 (08 Jul 2021 12:50) (77/45 - 137/69)  BP(mean): --  ABP: --  ABP(mean): --  RR: 18 (08 Jul 2021 12:50) (17 - 18)  SpO2: 96% (08 Jul 2021 12:50) (95% - 96%)        07-07 @ 07:01  -  07-08 @ 07:00  --------------------------------------------------------  IN: 880 mL / OUT: 1900 mL / NET: -1020 mL      CAPILLARY BLOOD GLUCOSE      POCT Blood Glucose.: 138 mg/dL (08 Jul 2021 13:56)      PHYSICAL EXAM:  General:   HEENT:   Neck:   Chest/Lungs:  Heart:  Abdomen:   Extremities:   Skin:   Neuro:   Psych:     LINES:     HOSPITAL MEDICATIONS:  MEDICATIONS  (STANDING):  aMIOdarone    Tablet 200 milliGRAM(s) Oral daily  aspirin  chewable 81 milliGRAM(s) Oral daily  atorvastatin 40 milliGRAM(s) Oral at bedtime  chlorhexidine 2% Cloths 1 Application(s) Topical daily  chlorhexidine 4% Liquid 1 Application(s) Topical <User Schedule>  dextrose 40% Gel 15 Gram(s) Oral once  dextrose 5%. 1000 milliLiter(s) (50 mL/Hr) IV Continuous <Continuous>  dextrose 5%. 1000 milliLiter(s) (100 mL/Hr) IV Continuous <Continuous>  dextrose 50% Injectable 25 Gram(s) IV Push once  dextrose 50% Injectable 12.5 Gram(s) IV Push once  dextrose 50% Injectable 25 Gram(s) IV Push once  epoetin danilo-epbx (RETACRIT) Injectable 44639 Unit(s) IV Push <User Schedule>  glucagon  Injectable 1 milliGRAM(s) IntraMuscular once  insulin lispro (ADMELOG) corrective regimen sliding scale   SubCutaneous three times a day before meals  insulin lispro (ADMELOG) corrective regimen sliding scale   SubCutaneous at bedtime  metoprolol tartrate 25 milliGRAM(s) Oral two times a day  midodrine. 20 milliGRAM(s) Oral three times a day  mupirocin 2% Ointment 1 Application(s) Topical two times a day  pantoprazole  Injectable 40 milliGRAM(s) IV Push every 12 hours  piperacillin/tazobactam IVPB.. 3.375 Gram(s) IV Intermittent every 12 hours  sodium chloride 0.9%. 1000 milliLiter(s) (50 mL/Hr) IV Continuous <Continuous>    MEDICATIONS  (PRN):  acetaminophen   Tablet .. 650 milliGRAM(s) Oral every 6 hours PRN Temp greater or equal to 38C (100.4F), Mild Pain (1 - 3)  calamine/zinc oxide Lotion 1 Application(s) Topical three times a day PRN Itching      LABS:                        10.6   11.98 )-----------( 171      ( 08 Jul 2021 12:21 )             34.7     Hgb Trend: 10.6<--, 10.6<--, 9.6<--, 9.3<--, 9.5<--  07-08    134<L>  |  95<L>  |  15  ----------------------------<  186<H>  3.9   |  24  |  6.10<H>    Ca    8.7      08 Jul 2021 12:32  Phos  3.4     07-08  Mg     1.70     07-08    TPro  6.5  /  Alb  3.5  /  TBili  0.9  /  DBili  x   /  AST  13  /  ALT  7   /  AlkPhos  71  07-08    Creatinine Trend: 6.10<--, TNP<--, 4.65<--, 7.48<--, 5.12<--, 5.01<--  PT/INR - ( 08 Jul 2021 12:21 )   PT: 19.1 sec;   INR: 1.72 ratio         PTT - ( 08 Jul 2021 12:21 )  PTT:28.7 sec      Venous Blood Gas:  07-08 @ 12:18  7.38/50/<24/26/14.2  VBG Lactate: --      MICROBIOLOGY:     RADIOLOGY & ADDITIONAL TESTS:      ASSESSMENT AND PLAN:  Mr././Ms. is a ___    #Neuro    #Cardiovascular    #Respiratory    #GI/Nutrition    #/Renal    #Skin    #ID    #Endocrine    #Hematologic/DVT ppx    #Ethics MICU Accept Note    CHIEF COMPLAINT:     HPI / INTERVAL HISTORY:    69 yo M with PMhx of CAD s/p CABG (course at that time complicated by pleural effusion requiring chest tubes), ESRD on HD (MWF), DMII, HTN, afib on coumadin, and anemia presented from [facility] for evaluation of chest pain and GIB. Patient is AAOX2 and was able to provide some information. Most of the information was obtained from charting. Patient was admitted to [facility] after developing chest pain during HD. He was found to have Hgb of 6 and elevated trops concerning for GIB and NSTEM II. He was given 2 units of pRBCs and monitored off warfarin/aspirin. He underwent bowel prep but it did not show any bloody stool. As a result, no colonoscopy was performed. His course was complicated by aflutter/afib with RVR. He was managed with metoprolol and never required cardioversion. On day of transfer, his Hgb was in the 8s. Patient was deemed stable enough to be restarted on aspirin and warfarin. Patient was transferred to [facility] for further evaluation. Currently, patient have no symptoms but reports to have intermittent chest pain, that is suprasternal, non-radiating, exertional at times, and non-reproducible on palpation. it improves with rest and worsens with movement. He denies any fever, chills, cough, orthopnea, PND, LE edema, abdominal pain, diarrhea, or dysuria.  On the floor, vitals are WNL.     MICU was consulted yesterday due to hypotension to 80/51. Patient was asymptomatic, denying f/c, n/v, dizziness, HA, CP, SOB at rest, abd pain. Midodrine 30 mg was recommended. He received 20mg at 21:00 and 10mg at 00:00am. Today, the patient continued to be hypotensive, morning dose of Midodrine was not given due to parameters. Chest tubes were placed for loculated pleural effusion and Midodrine was given. At that time the patient became bradycardic and hypotensive. He was given atropine and neosyneprhine IVP. Levophed was started for BP support and he was transferred to the MICU for further management and monitoring.       PAST MEDICAL & SURGICAL HISTORY:  HTN (Hypertension)    DM (Diabetes Mellitus)  x 4 yrs without N/N/R    CAD (Coronary Artery Disease)  Hypercholesterolemia  Coronary Stent  CABG in 2019  Heart Attack  2/1/07 with subsequent stent    H/O: CVA  after cardiac stent placed 12/15/09 -no residual    Hyperthyroidism    Stented coronary artery  total 15 stents from 2772-2980    ESRD (end stage renal disease) on dialysis  MWF    Boil of [facility]  2006 and 2008    S/P cataract extraction    Hemodialysis access, AV graft  5/2015, L arm    S/P CABG x 4        FAMILY HISTORY:  Family history of diabetes mellitus (Sibling)  Family history of coronary artery disease      SOCIAL HISTORY:  Smoking:   Substance Use:   EtOH Use:   Marital Status:   Sexual History:   Occupation:  Recent Travel:  Country of Birth:   Advance Directives:     HOME MEDICATIONS:      Allergies    lisinopril (Other)  opioid-like analgesics (Other)    Intolerances          REVIEW OF SYSTEMS:  Constitutional: No fevers, chills, weight loss, weight gain  HEENT: No vision problems, eye pain, nasal congestion, rhinorrhea, sore throat, dysphagia  CV: No chest pain, orthopnea, palpitations  Resp: No cough, dyspnea, wheezing, hemoptysis  GI: No nausea, vomiting, diarrhea, constipation, abdominal pain  : [ ] dysuria [ ] nocturia [ ] hematuria [ ] increased urinary frequency  Musculoskeletal: [ ] back pain [ ] myalgias [ ] arthralgias [ ] fracture  Skin: [ ] rash [ ] itch  Neurological: [ ] headache [ ] dizziness [ ] syncope [ ] weakness [ ] numbness  Psychiatric: [ ] anxiety [ ] depression  Endocrine: [ ] diabetes [ ] thyroid problem  Hematologic/Lymphatic: [ ] anemia [ ] bleeding problem  Allergic/Immunologic: [ ] itchy eyes [ ] nasal discharge [ ] hives [ ] angioedema  [ ] All other systems negative  [ ] Unable to assess ROS because ________    OBJECTIVE:  ICU Vital Signs Last 24 Hrs  T(C): 36.7 (08 Jul 2021 12:00), Max: 38.8 (07 Jul 2021 17:41)  T(F): 98 (08 Jul 2021 12:00), Max: 101.9 (07 Jul 2021 17:41)  HR: 68 (08 Jul 2021 12:50) (68 - 113)  BP: 77/45 (08 Jul 2021 12:50) (77/45 - 137/69)  BP(mean): --  ABP: --  ABP(mean): --  RR: 18 (08 Jul 2021 12:50) (17 - 18)  SpO2: 96% (08 Jul 2021 12:50) (95% - 96%)        07-07 @ 07:01  -  07-08 @ 07:00  --------------------------------------------------------  IN: 880 mL / OUT: 1900 mL / NET: -1020 mL      CAPILLARY BLOOD GLUCOSE      POCT Blood Glucose.: 138 mg/dL (08 Jul 2021 13:56)      PHYSICAL EXAM:  General:   HEENT:   Neck:   Chest/Lungs:  Heart:  Abdomen:   Extremities:   Skin:   Neuro:   Psych:     LINES:     HOSPITAL MEDICATIONS:  MEDICATIONS  (STANDING):  aMIOdarone    Tablet 200 milliGRAM(s) Oral daily  aspirin  chewable 81 milliGRAM(s) Oral daily  atorvastatin 40 milliGRAM(s) Oral at bedtime  chlorhexidine 2% Cloths 1 Application(s) Topical daily  chlorhexidine 4% Liquid 1 Application(s) Topical <User Schedule>  dextrose 40% Gel 15 Gram(s) Oral once  dextrose 5%. 1000 milliLiter(s) (50 mL/Hr) IV Continuous <Continuous>  dextrose 5%. 1000 milliLiter(s) (100 mL/Hr) IV Continuous <Continuous>  dextrose 50% Injectable 25 Gram(s) IV Push once  dextrose 50% Injectable 12.5 Gram(s) IV Push once  dextrose 50% Injectable 25 Gram(s) IV Push once  epoetin danilo-epbx (RETACRIT) Injectable 27118 Unit(s) IV Push <User Schedule>  glucagon  Injectable 1 milliGRAM(s) IntraMuscular once  insulin lispro (ADMELOG) corrective regimen sliding scale   SubCutaneous three times a day before meals  insulin lispro (ADMELOG) corrective regimen sliding scale   SubCutaneous at bedtime  metoprolol tartrate 25 milliGRAM(s) Oral two times a day  midodrine. 20 milliGRAM(s) Oral three times a day  mupirocin 2% Ointment 1 Application(s) Topical two times a day  pantoprazole  Injectable 40 milliGRAM(s) IV Push every 12 hours  piperacillin/tazobactam IVPB.. 3.375 Gram(s) IV Intermittent every 12 hours  sodium chloride 0.9%. 1000 milliLiter(s) (50 mL/Hr) IV Continuous <Continuous>    MEDICATIONS  (PRN):  acetaminophen   Tablet .. 650 milliGRAM(s) Oral every 6 hours PRN Temp greater or equal to 38C (100.4F), Mild Pain (1 - 3)  calamine/zinc oxide Lotion 1 Application(s) Topical three times a day PRN Itching      LABS:                        10.6   11.98 )-----------( 171      ( 08 Jul 2021 12:21 )             34.7     Hgb Trend: 10.6<--, 10.6<--, 9.6<--, 9.3<--, 9.5<--  07-08    134<L>  |  95<L>  |  15  ----------------------------<  186<H>  3.9   |  24  |  6.10<H>    Ca    8.7      08 Jul 2021 12:32  Phos  3.4     07-08  Mg     1.70     07-08    TPro  6.5  /  Alb  3.5  /  TBili  0.9  /  DBili  x   /  AST  13  /  ALT  7   /  AlkPhos  71  07-08    Creatinine Trend: 6.10<--, TNP<--, 4.65<--, 7.48<--, 5.12<--, 5.01<--  PT/INR - ( 08 Jul 2021 12:21 )   PT: 19.1 sec;   INR: 1.72 ratio         PTT - ( 08 Jul 2021 12:21 )  PTT:28.7 sec      Venous Blood Gas:  07-08 @ 12:18  7.38/50/<24/26/14.2  VBG Lactate: --      MICROBIOLOGY:     RADIOLOGY & ADDITIONAL TESTS:      ASSESSMENT AND PLAN:  //Ms. is a ___    #Neuro    #Cardiovascular    #Respiratory    #GI/Nutrition    #/Renal    #Skin    #ID    #Endocrine    #Hematologic/DVT ppx    #Ethics MICU Accept Note    CHIEF COMPLAINT:     HPI / INTERVAL HISTORY:    69 yo M with PMhx of CAD s/p CABG (course at that time complicated by pleural effusion requiring chest tubes), ESRD on HD (MWF), DMII, HTN, afib on coumadin, and anemia presented from [facility] for evaluation of chest pain and GIB. Patient is AAOX2 and was able to provide some information. Most of the information was obtained from charting. Patient was admitted to [facility] after developing chest pain during HD. He was found to have Hgb of 6 and elevated trops concerning for GIB and NSTEM II. He was given 2 units of pRBCs and monitored off warfarin/aspirin. He underwent bowel prep but it did not show any bloody stool. As a result, no colonoscopy was performed. His course was complicated by aflutter/afib with RVR. He was managed with metoprolol and never required cardioversion. On day of transfer, his Hgb was in the 8s. Patient was deemed stable enough to be restarted on aspirin and warfarin. Patient was transferred to [facility] for further evaluation. Currently, patient have no symptoms but reports to have intermittent chest pain, that is suprasternal, non-radiating, exertional at times, and non-reproducible on palpation. it improves with rest and worsens with movement. He denies any fever, chills, cough, orthopnea, PND, LE edema, abdominal pain, diarrhea, or dysuria.  On the floor, vitals are WNL.     MICU was consulted yesterday due to hypotension to 80/51. Patient was asymptomatic, denying f/c, n/v, dizziness, HA, CP, SOB at rest, abd pain. Midodrine 30 mg was recommended. He received 20mg at 20:55 and 10mg at 00:00am. Today, the patient continued to be hypotensive, morning dose of Midodrine was not given due to parameters. Chest tubes were placed for loculated pleural effusion and Midodrine was given. At that time the patient became bradycardic and hypotensive, a rapid response was called. He was given atropine and neosyneprhine IVP. Levophed was started for BP support and he was transferred to the MICU for further management and monitoring.       PAST MEDICAL & SURGICAL HISTORY:  HTN (Hypertension)    DM (Diabetes Mellitus)  x 4 yrs without N/N/R    CAD (Coronary Artery Disease)  Hypercholesterolemia  Coronary Stent  CABG in 2019  Heart Attack  2/1/07 with subsequent stent    H/O: CVA  after cardiac stent placed 12/15/09 -no residual    Hyperthyroidism    Stented coronary artery  total 15 stents from 0190-9653    ESRD (end stage renal disease) on dialysis  MWF    Boil of Buttock  2006 and 2008    S/P cataract extraction    Hemodialysis access, AV graft  5/2015, L arm    S/P CABG x 4      FAMILY HISTORY:  Family history of diabetes mellitus (Sibling)  Family history of coronary artery disease      SOCIAL HISTORY:  Smoking:   Substance Use:   EtOH Use:   Marital Status:   Sexual History:   Occupation:  Recent Travel:  Country of Birth:   Advance Directives:     HOME MEDICATIONS:      Allergies    lisinopril (Other)  opioid-like analgesics (Other)    Intolerances          REVIEW OF SYSTEMS:  Constitutional: No fevers, chills, weight loss, weight gain  HEENT: No vision problems, eye pain, nasal congestion, rhinorrhea, sore throat, dysphagia  CV: No chest pain, orthopnea, palpitations  Resp: No cough, dyspnea, wheezing, hemoptysis  GI: No nausea, vomiting, diarrhea, constipation, abdominal pain  : [ ] dysuria [ ] nocturia [ ] hematuria [ ] increased urinary frequency  Musculoskeletal: [ ] back pain [ ] myalgias [ ] arthralgias [ ] fracture  Skin: [ ] rash [ ] itch  Neurological: [ ] headache [ ] dizziness [ ] syncope [ ] weakness [ ] numbness  Psychiatric: [ ] anxiety [ ] depression  Endocrine: [ ] diabetes [ ] thyroid problem  Hematologic/Lymphatic: [ ] anemia [ ] bleeding problem  Allergic/Immunologic: [ ] itchy eyes [ ] nasal discharge [ ] hives [ ] angioedema  [ ] All other systems negative  [ ] Unable to assess ROS because ________    OBJECTIVE:  ICU Vital Signs Last 24 Hrs  T(C): 36.7 (08 Jul 2021 12:00), Max: 38.8 (07 Jul 2021 17:41)  T(F): 98 (08 Jul 2021 12:00), Max: 101.9 (07 Jul 2021 17:41)  HR: 68 (08 Jul 2021 12:50) (68 - 113)  BP: 77/45 (08 Jul 2021 12:50) (77/45 - 137/69)  BP(mean): --  ABP: --  ABP(mean): --  RR: 18 (08 Jul 2021 12:50) (17 - 18)  SpO2: 96% (08 Jul 2021 12:50) (95% - 96%)        07-07 @ 07:01  -  07-08 @ 07:00  --------------------------------------------------------  IN: 880 mL / OUT: 1900 mL / NET: -1020 mL      CAPILLARY BLOOD GLUCOSE      POCT Blood Glucose.: 138 mg/dL (08 Jul 2021 13:56)      PHYSICAL EXAM:  GENERAL:   HEENT: KATHY/ Atraumatic, Normocephalic  ENMT: No tonsillar erythema, exudates, or enlargement; Moist mucous membranes, Good dentition, No lesions  NECK: Supple, No JVD, Normal thyroid  CHEST/LUNG:   CVS: Regular rate and rhythm; No murmurs, rubs, or gallops  GI: : Soft, Nontender, Nondistended; Bowel sounds present  NERVOUS SYSTEM:  Alert & Oriented X3  EXTREMITIES: + edema  LYMPH: No lymphadenopathy noted  SKIN: No rashes or lesions  ENDOCRINOLOGY: No Thyromegaly  PSYCH: Appropriate    LINES:     HOSPITAL MEDICATIONS:  MEDICATIONS  (STANDING):  aMIOdarone    Tablet 200 milliGRAM(s) Oral daily  aspirin  chewable 81 milliGRAM(s) Oral daily  atorvastatin 40 milliGRAM(s) Oral at bedtime  chlorhexidine 2% Cloths 1 Application(s) Topical daily  chlorhexidine 4% Liquid 1 Application(s) Topical <User Schedule>  dextrose 40% Gel 15 Gram(s) Oral once  dextrose 5%. 1000 milliLiter(s) (50 mL/Hr) IV Continuous <Continuous>  dextrose 5%. 1000 milliLiter(s) (100 mL/Hr) IV Continuous <Continuous>  dextrose 50% Injectable 25 Gram(s) IV Push once  dextrose 50% Injectable 12.5 Gram(s) IV Push once  dextrose 50% Injectable 25 Gram(s) IV Push once  epoetin danilo-epbx (RETACRIT) Injectable 13045 Unit(s) IV Push <User Schedule>  glucagon  Injectable 1 milliGRAM(s) IntraMuscular once  insulin lispro (ADMELOG) corrective regimen sliding scale   SubCutaneous three times a day before meals  insulin lispro (ADMELOG) corrective regimen sliding scale   SubCutaneous at bedtime  metoprolol tartrate 25 milliGRAM(s) Oral two times a day  midodrine. 20 milliGRAM(s) Oral three times a day  mupirocin 2% Ointment 1 Application(s) Topical two times a day  pantoprazole  Injectable 40 milliGRAM(s) IV Push every 12 hours  piperacillin/tazobactam IVPB.. 3.375 Gram(s) IV Intermittent every 12 hours  sodium chloride 0.9%. 1000 milliLiter(s) (50 mL/Hr) IV Continuous <Continuous>    MEDICATIONS  (PRN):  acetaminophen   Tablet .. 650 milliGRAM(s) Oral every 6 hours PRN Temp greater or equal to 38C (100.4F), Mild Pain (1 - 3)  calamine/zinc oxide Lotion 1 Application(s) Topical three times a day PRN Itching      LABS:                        10.6   11.98 )-----------( 171      ( 08 Jul 2021 12:21 )             34.7     Hgb Trend: 10.6<--, 10.6<--, 9.6<--, 9.3<--, 9.5<--    07-08    134<L>  |  95<L>  |  15  ----------------------------<  186<H>  3.9   |  24  |  6.10<H>    Ca    8.7      08 Jul 2021 12:32  Phos  3.4     07-08  Mg     1.70     07-08    TPro  6.5  /  Alb  3.5  /  TBili  0.9  /  DBili  x   /  AST  13  /  ALT  7   /  AlkPhos  71  07-08    Creatinine Trend: 6.10<--, TNP<--, 4.65<--, 7.48<--, 5.12<--, 5.01<--  PT/INR - ( 08 Jul 2021 12:21 )   PT: 19.1 sec;   INR: 1.72 ratio    PTT - ( 08 Jul 2021 12:21 )  PTT:28.7 sec      Venous Blood Gas:  07-08 @ 12:18  7.38/50/<24/26/14.2  VBG Lactate: --      MICROBIOLOGY:       RADIOLOGY & ADDITIONAL TESTS:  INTERPRETATION:  XR CHEST.   INDICATION: pleural effusion  IMPRESSION: Status post sternotomy. Stent graft projecting over the left subclavian region. Large left pleural effusion and underlying atelectasis. Pulmonary edema.        ASSESSMENT AND PLAN:  69 yo M with PMhx of CAD s/p CABG (course at that time complicated by pleural effusion requiring chest tubes), ESRD on HD (MWF), DMII, HTN, afib on coumadin, and anemia transferred from [facility] for evaluation of chest pain after HD and GIB s/p 2U PRBC transfusion and aflutter/afib with RVR managed with metoprolol.     #Neuro  -     #Cardiovascular  - Hypotension: MICU was consulted for hypotension one day prior to transfer for hypotension responsive to Midodrine and 50cc IV bolus. Today he presented bradycardia and worsening hypotension after placement of chest tube. He was given atropine, Chris and Levophed.    - c/w Levophed    - monitor HR and BP    #Respiratory  - Loculated Pleural effusions:     - c/w Zosyn      #GI/Nutrition:  - Diet: clear liquids    #/Renal  - ESRD: HD on MWF. Most recent Scr 6.10 an increase from the previous labs. Potassium is stable. F/u HD tomorrow.     #Skin  -    #ID  - c/w Mupirocin      #Endocrine  - DMII: c/w Lispro SS. Monitor glucose.    #Hematologic/DVT ppx  -     #Ethics        #Ethics MICU Accept Note    CHIEF COMPLAINT:     HPI / INTERVAL HISTORY:    71 yo M with PMhx of CAD s/p CABG (course at that time complicated by pleural effusion requiring chest tubes), ESRD on HD (MWF), DMII, HTN, afib on coumadin, and anemia presented from [facility] for evaluation of chest pain and GIB. Patient is AAOX2 and was able to provide some information. Most of the information was obtained from charting. Patient was admitted to [facility] after developing chest pain during HD. He was found to have Hgb of 6 and elevated trops concerning for GIB and NSTEM II. He was given 2 units of pRBCs and monitored off warfarin/aspirin. He underwent bowel prep but it did not show any bloody stool. As a result, no colonoscopy was performed. His course was complicated by aflutter/afib with RVR. He was managed with metoprolol and never required cardioversion. On day of transfer, his Hgb was in the 8s. Patient was deemed stable enough to be restarted on aspirin and warfarin. Patient was transferred to [facility] for further evaluation. Currently, patient have no symptoms but reports to have intermittent chest pain, that is suprasternal, non-radiating, exertional at times, and non-reproducible on palpation. it improves with rest and worsens with movement. He denies any fever, chills, cough, orthopnea, PND, LE edema, abdominal pain, diarrhea, or dysuria.  On the floor, vitals are WNL.     MICU was consulted yesterday due to hypotension to 80/51. Patient was asymptomatic, denying f/c, n/v, dizziness, HA, CP, SOB at rest, abd pain. Midodrine 30 mg was recommended. He received 20mg at 20:55 and 10mg at 00:00am. Today, the patient continued to be hypotensive, morning dose of Midodrine was not given due to parameters. Chest tubes were placed for loculated pleural effusion and Midodrine was given. At that time the patient became bradycardic and hypotensive, RRT was called due to multifactorial shock possibly 2/2 sepsis iso recent fluid removal from CT and possibly medication induced bradycardia. Pt persistently hypotensive and bradycardic during rapid. Patient then given atropine 0.5mg x 2 with no response. Given push of phenylephrine and started on norepinephrine with improvement of blood pressures. Patient transferred to MICU.      PAST MEDICAL & SURGICAL HISTORY:  HTN (Hypertension)    DM (Diabetes Mellitus)  x 4 yrs without N/N/R    CAD (Coronary Artery Disease)  Hypercholesterolemia  Coronary Stent  CABG in 2019  Heart Attack  2/1/07 with subsequent stent    H/O: CVA  after cardiac stent placed 12/15/09 -no residual    Hyperthyroidism    Stented coronary artery  total 15 stents from [id number]    ESRD (end stage renal disease) on dialysis  MWF    Boil of Buttock  2006 and 2008    S/P cataract extraction    Hemodialysis access, AV graft  5/2015, L arm    S/P CABG x 4      FAMILY HISTORY:  Family history of diabetes mellitus (Sibling)  Family history of coronary artery disease      SOCIAL HISTORY:  Smoking:   Substance Use:   EtOH Use:   Marital Status:   Sexual History:   Occupation:  Recent Travel:  Country of Birth:   Advance Directives:     HOME MEDICATIONS:      Allergies    lisinopril (Other)  opioid-like analgesics (Other)    Intolerances          REVIEW OF SYSTEMS:  Constitutional: No fevers, chills, weight loss, weight gain  HEENT: No vision problems, eye pain, nasal congestion, rhinorrhea, sore throat, dysphagia  CV: No chest pain, orthopnea, palpitations  Resp: No cough, dyspnea, wheezing, hemoptysis  GI: No nausea, vomiting, diarrhea, constipation, abdominal pain  : [ ] dysuria [ ] nocturia [ ] hematuria [ ] increased urinary frequency  Musculoskeletal: [x ] back pain [ ] myalgias [ ] arthralgias [ ] fracture  Skin: [ ] rash [ ] itch  Neurological: [ ] headache [ ] dizziness [ ] syncope [ ] weakness [ ] numbness  Psychiatric: [ ] anxiety [ ] depression  Endocrine: [ ] diabetes [ ] thyroid problem  Hematologic/Lymphatic: [ ] anemia [ ] bleeding problem  Allergic/Immunologic: [ ] itchy eyes [ ] nasal discharge [ ] hives [ ] angioedema  [x ] All other systems negative  [ ] Unable to assess ROS because ________    OBJECTIVE:  ICU Vital Signs Last 24 Hrs  T(C): 36.7 (08 Jul 2021 12:00), Max: 38.8 (07 Jul 2021 17:41)  T(F): 98 (08 Jul 2021 12:00), Max: 101.9 (07 Jul 2021 17:41)  HR: 68 (08 Jul 2021 12:50) (68 - 113)  BP: 77/45 (08 Jul 2021 12:50) (77/45 - 137/69)  BP(mean): --  ABP: --  ABP(mean): --  RR: 18 (08 Jul 2021 12:50) (17 - 18)  SpO2: 96% (08 Jul 2021 12:50) (95% - 96%)        07-07 @ 07:01  -  07-08 @ 07:00  --------------------------------------------------------  IN: 880 mL / OUT: 1900 mL / NET: -1020 mL      CAPILLARY BLOOD GLUCOSE      POCT Blood Glucose.: 138 mg/dL (08 Jul 2021 13:56)      PHYSICAL EXAM:  GENERAL: NAD, sleeping but easily arousable.   HEENT: KATHY/ Atraumatic, Normocephalic  ENMT: No tonsillar erythema, exudates, or enlargement; Moist mucous membranes, No lesions  NECK: Supple, No JVD, Normal thyroid  CHEST/LUNG: CTA, no wheezes or rhonchis. Chest tube in left hemithorax.  CVS: Regular rate and rhythm; No murmurs, rubs, or gallops  GI: : Soft, Nontender, Nondistended; Bowel sounds present  NERVOUS SYSTEM:  Alert & Oriented X3  EXTREMITIES: + edema  LYMPH: No lymphadenopathy noted  SKIN: No rashes or lesions  ENDOCRINOLOGY: No Thyromegaly  PSYCH: Appropriate    LINES:     HOSPITAL MEDICATIONS:  MEDICATIONS  (STANDING):  aMIOdarone    Tablet 200 milliGRAM(s) Oral daily  aspirin  chewable 81 milliGRAM(s) Oral daily  atorvastatin 40 milliGRAM(s) Oral at bedtime  chlorhexidine 2% Cloths 1 Application(s) Topical daily  chlorhexidine 4% Liquid 1 Application(s) Topical <User Schedule>  dextrose 40% Gel 15 Gram(s) Oral once  dextrose 5%. 1000 milliLiter(s) (50 mL/Hr) IV Continuous <Continuous>  dextrose 5%. 1000 milliLiter(s) (100 mL/Hr) IV Continuous <Continuous>  dextrose 50% Injectable 25 Gram(s) IV Push once  dextrose 50% Injectable 12.5 Gram(s) IV Push once  dextrose 50% Injectable 25 Gram(s) IV Push once  epoetin danilo-epbx (RETACRIT) Injectable 03091 Unit(s) IV Push <User Schedule>  glucagon  Injectable 1 milliGRAM(s) IntraMuscular once  insulin lispro (ADMELOG) corrective regimen sliding scale   SubCutaneous three times a day before meals  insulin lispro (ADMELOG) corrective regimen sliding scale   SubCutaneous at bedtime  metoprolol tartrate 25 milliGRAM(s) Oral two times a day  midodrine. 20 milliGRAM(s) Oral three times a day  mupirocin 2% Ointment 1 Application(s) Topical two times a day  pantoprazole  Injectable 40 milliGRAM(s) IV Push every 12 hours  piperacillin/tazobactam IVPB.. 3.375 Gram(s) IV Intermittent every 12 hours  sodium chloride 0.9%. 1000 milliLiter(s) (50 mL/Hr) IV Continuous <Continuous>    MEDICATIONS  (PRN):  acetaminophen   Tablet .. 650 milliGRAM(s) Oral every 6 hours PRN Temp greater or equal to 38C (100.4F), Mild Pain (1 - 3)  calamine/zinc oxide Lotion 1 Application(s) Topical three times a day PRN Itching      LABS:                        10.6   11.98 )-----------( 171      ( 08 Jul 2021 12:21 )             34.7     Hgb Trend: 10.6<--, 10.6<--, 9.6<--, 9.3<--, 9.5<--    07-08    134<L>  |  95<L>  |  15  ----------------------------<  186<H>  3.9   |  24  |  6.10<H>    Ca    8.7      08 Jul 2021 12:32  Phos  3.4     07-08  Mg     1.70     07-08    TPro  6.5  /  Alb  3.5  /  TBili  0.9  /  DBili  x   /  AST  13  /  ALT  7   /  AlkPhos  71  07-08    Creatinine Trend: 6.10<--, TNP<--, 4.65<--, 7.48<--, 5.12<--, 5.01<--  PT/INR - ( 08 Jul 2021 12:21 )   PT: 19.1 sec;   INR: 1.72 ratio    PTT - ( 08 Jul 2021 12:21 )  PTT:28.7 sec      Venous Blood Gas:  07-08 @ 12:18  7.38/50/<24/26/14.2  VBG Lactate: --      MICROBIOLOGY:       RADIOLOGY & ADDITIONAL TESTS:  INTERPRETATION:  XR CHEST.   INDICATION: pleural effusion  IMPRESSION: Status post sternotomy. Stent graft projecting over the left subclavian region. Large left pleural effusion and underlying atelectasis. Pulmonary edema.        ASSESSMENT AND PLAN:  71 yo M with PMhx of CAD s/p CABG (course at that time complicated by pleural effusion requiring chest tubes), ESRD on HD (MWF), DMII, HTN, afib on coumadin, and anemia transferred from [facility] for evaluation of chest pain after HD and GIB s/p 2U PRBC transfusion and aflutter/afib with RVR managed with metoprolol.     #Neuro  - no active issues.    #Cardiovascular  - Hypotension: MICU was consulted for hypotension one day prior to transfer for hypotension responsive to Midodrine and 50cc IV bolus. Today he presented bradycardia and worsening hypotension after placement of chest tube. He was given atropine, Chris and Levophed.    - c/w Levophed    - monitor HR and BP    #Respiratory  - Loculated Pleural effusions: Chest tube on left hemithorax draining serosanguineous fluid.     - c/w Zosyn      #GI/Nutrition:  - Diet: clear liquids    #/Renal  - ESRD: HD on MWF. Most recent Scr 6.10 an increase from the previous labs. Potassium is stable. F/u HD tomorrow.     #Skin  - no active issues.    #ID  - SA PCR: positive. c/w Mupirocin      #Endocrine  - DMII: c/w Lispro SS. Monitor glucose.    #Hematologic/DVT ppx  -     #Ethics        #Ethics MICU Accept Note    CHIEF COMPLAINT:     HPI / INTERVAL HISTORY:    69 yo M with PMhx of CAD s/p CABG (course at that time complicated by pleural effusion requiring chest tubes), ESRD on HD (MWF), DMII, HTN, afib on coumadin, and anemia presented from [facility] for evaluation of chest pain and GIB. Patient is AAOX2 and was able to provide some information. Most of the information was obtained from charting. Patient was admitted to [facility] after developing chest pain during HD. He was found to have Hgb of 6 and elevated trops concerning for GIB and NSTEM II. He was given 2 units of pRBCs and monitored off warfarin/aspirin. He underwent bowel prep but it did not show any bloody stool. As a result, no colonoscopy was performed. His course was complicated by aflutter/afib with RVR. He was managed with metoprolol and never required cardioversion. On day of transfer, his Hgb was in the 8s. Patient was deemed stable enough to be restarted on aspirin and warfarin. Patient was transferred to [facility] for further evaluation. Currently, patient have no symptoms but reports to have intermittent chest pain, that is suprasternal, non-radiating, exertional at times, and non-reproducible on palpation. it improves with rest and worsens with movement. He denies any fever, chills, cough, orthopnea, PND, LE edema, abdominal pain, diarrhea, or dysuria.  On the floor, vitals are WNL.     MICU was consulted yesterday due to hypotension to 80/51. Patient was asymptomatic, denying f/c, n/v, dizziness, HA, CP, SOB at rest, abd pain. Midodrine 30 mg was recommended. He received 20mg at 20:55 and 10mg at 00:00am. Today, the patient continued to be hypotensive, morning dose of Midodrine was not given due to parameters. Chest tubes were placed for loculated pleural effusion and Midodrine was given. At that time the patient became bradycardic and hypotensive, RRT was called due to multifactorial shock possibly 2/2 sepsis iso recent fluid removal from CT and possibly medication induced bradycardia. Pt persistently hypotensive and bradycardic during rapid. Patient then given atropine 0.5mg x 2 with no response. Given push of phenylephrine and started on norepinephrine with improvement of blood pressures. Patient transferred to MICU.      PAST MEDICAL & SURGICAL HISTORY:  HTN (Hypertension)    DM (Diabetes Mellitus)  x 4 yrs without N/N/R    CAD (Coronary Artery Disease)  Hypercholesterolemia  Coronary Stent  CABG in 2019  Heart Attack  2/1/07 with subsequent stent    H/O: CVA  after cardiac stent placed 12/15/09 -no residual    Hyperthyroidism    Stented coronary artery  total 15 stents from 0584-9323    ESRD (end stage renal disease) on dialysis  MWF    Boil of Buttock  2006 and 2008    S/P cataract extraction    Hemodialysis access, AV graft  5/2015, L arm    S/P CABG x 4      FAMILY HISTORY:  Family history of diabetes mellitus (Sibling)  Family history of coronary artery disease      SOCIAL HISTORY:  Smoking:   Substance Use:   EtOH Use:   Marital Status:   Sexual History:   Occupation:  Recent Travel:  Country of Birth:   Advance Directives:     HOME MEDICATIONS:      Allergies    lisinopril (Other)  opioid-like analgesics (Other)    Intolerances          REVIEW OF SYSTEMS:  Constitutional: No fevers, chills, weight loss, weight gain  HEENT: No vision problems, eye pain, nasal congestion, rhinorrhea, sore throat, dysphagia  CV: No chest pain, orthopnea, palpitations  Resp: + cough and chills, - dyspnea, wheezing, hemoptysis  GI: No nausea, vomiting, diarrhea, constipation, + abdominal pain  : [ ] dysuria [ ] nocturia [ ] hematuria [ ] increased urinary frequency  Musculoskeletal: [x ] back pain [ ] myalgias [ ] arthralgias [ ] fracture  Skin: [ ] rash [ ] itch  Neurological: [ ] headache [ ] dizziness [ ] syncope [ ] weakness [ ] numbness  Psychiatric: [ ] anxiety [ ] depression  Endocrine: [ ] diabetes [ ] thyroid problem  Hematologic/Lymphatic: [ ] anemia [ ] bleeding problem  Allergic/Immunologic: [ ] itchy eyes [ ] nasal discharge [ ] hives [ ] angioedema  [x ] All other systems negative  [ ] Unable to assess ROS because ________    OBJECTIVE:  ICU Vital Signs Last 24 Hrs  T(C): 36.7 (08 Jul 2021 12:00), Max: 38.8 (07 Jul 2021 17:41)  T(F): 98 (08 Jul 2021 12:00), Max: 101.9 (07 Jul 2021 17:41)  HR: 68 (08 Jul 2021 12:50) (68 - 113)  BP: 77/45 (08 Jul 2021 12:50) (77/45 - 137/69)  BP(mean): --  ABP: --  ABP(mean): --  RR: 18 (08 Jul 2021 12:50) (17 - 18)  SpO2: 96% (08 Jul 2021 12:50) (95% - 96%)        07-07 @ 07:01  -  07-08 @ 07:00  --------------------------------------------------------  IN: 880 mL / OUT: 1900 mL / NET: -1020 mL      CAPILLARY BLOOD GLUCOSE      POCT Blood Glucose.: 138 mg/dL (08 Jul 2021 13:56)      PHYSICAL EXAM:  GENERAL: NAD, sleepy but easily arousable.   HEENT: KATHY/ Atraumatic, Normocephalic  ENMT: No tonsillar erythema, exudates, or enlargement; Moist mucous membranes, No lesions  NECK: Supple, No JVD, Normal thyroid  CHEST/LUNG: CTA, no wheezes or rhonchis. Chest tube in left hemithorax.  CVS: Regular rate and rhythm; No murmurs, rubs, or gallops  GI: : Soft, Nontender, Nondistended; Bowel sounds present  NERVOUS SYSTEM:  Alert & Oriented X3  EXTREMITIES: + trace edema  LYMPH: No lymphadenopathy noted  SKIN: No rashes or lesions  ENDOCRINOLOGY: No Thyromegaly  PSYCH: Appropriate    LINES:     HOSPITAL MEDICATIONS:  MEDICATIONS  (STANDING):  aMIOdarone    Tablet 200 milliGRAM(s) Oral daily  aspirin  chewable 81 milliGRAM(s) Oral daily  atorvastatin 40 milliGRAM(s) Oral at bedtime  chlorhexidine 2% Cloths 1 Application(s) Topical daily  chlorhexidine 4% Liquid 1 Application(s) Topical <User Schedule>  dextrose 40% Gel 15 Gram(s) Oral once  dextrose 5%. 1000 milliLiter(s) (50 mL/Hr) IV Continuous <Continuous>  dextrose 5%. 1000 milliLiter(s) (100 mL/Hr) IV Continuous <Continuous>  dextrose 50% Injectable 25 Gram(s) IV Push once  dextrose 50% Injectable 12.5 Gram(s) IV Push once  dextrose 50% Injectable 25 Gram(s) IV Push once  epoetin danilo-epbx (RETACRIT) Injectable 73766 Unit(s) IV Push <User Schedule>  glucagon  Injectable 1 milliGRAM(s) IntraMuscular once  insulin lispro (ADMELOG) corrective regimen sliding scale   SubCutaneous three times a day before meals  insulin lispro (ADMELOG) corrective regimen sliding scale   SubCutaneous at bedtime  metoprolol tartrate 25 milliGRAM(s) Oral two times a day  midodrine. 20 milliGRAM(s) Oral three times a day  mupirocin 2% Ointment 1 Application(s) Topical two times a day  pantoprazole  Injectable 40 milliGRAM(s) IV Push every 12 hours  piperacillin/tazobactam IVPB.. 3.375 Gram(s) IV Intermittent every 12 hours  sodium chloride 0.9%. 1000 milliLiter(s) (50 mL/Hr) IV Continuous <Continuous>    MEDICATIONS  (PRN):  acetaminophen   Tablet .. 650 milliGRAM(s) Oral every 6 hours PRN Temp greater or equal to 38C (100.4F), Mild Pain (1 - 3)  calamine/zinc oxide Lotion 1 Application(s) Topical three times a day PRN Itching      LABS:                        10.6   11.98 )-----------( 171      ( 08 Jul 2021 12:21 )             34.7     Hgb Trend: 10.6<--, 10.6<--, 9.6<--, 9.3<--, 9.5<--    07-08    134<L>  |  95<L>  |  15  ----------------------------<  186<H>  3.9   |  24  |  6.10<H>    Ca    8.7      08 Jul 2021 12:32  Phos  3.4     07-08  Mg     1.70     07-08    TPro  6.5  /  Alb  3.5  /  TBili  0.9  /  DBili  x   /  AST  13  /  ALT  7   /  AlkPhos  71  07-08    Creatinine Trend: 6.10<--, TNP<--, 4.65<--, 7.48<--, 5.12<--, 5.01<--  PT/INR - ( 08 Jul 2021 12:21 )   PT: 19.1 sec;   INR: 1.72 ratio    PTT - ( 08 Jul 2021 12:21 )  PTT:28.7 sec      Venous Blood Gas:  07-08 @ 12:18  7.38/50/<24/26/14.2  VBG Lactate: --      MICROBIOLOGY:       RADIOLOGY & ADDITIONAL TESTS:  INTERPRETATION:  XR CHEST.   INDICATION: pleural effusion  IMPRESSION: Status post sternotomy. Stent graft projecting over the left subclavian region. Large left pleural effusion and underlying atelectasis. Pulmonary edema.        ASSESSMENT AND PLAN:  69 yo M with PMhx of CAD s/p CABG (course at that time complicated by pleural effusion requiring chest tubes), ESRD on HD (MWF), DMII, HTN, afib on coumadin, and anemia transferred from [facility] for evaluation of chest pain after HD and GIB s/p 2U PRBC transfusion and aflutter/afib with RVR managed with metoprolol. Course complicated by hypotension likely 2/2 to sepsis.    #Neuro  - no active issues.    #Cardiovascular  - Hypotension: MICU was consulted one day prior to transfer for hypotension responsive to Midodrine and 500cc IV bolus. Today he presented bradycardia and worsening hypotension after placement of chest tube. He was given atropine, Chris and Levophed.    - c/w Levophed    - monitor HR and BP    #Respiratory  - Loculated Pleural effusions: Chest tube on left hemithorax draining serosanguineous fluid.     - c/w Zosyn      #GI/Nutrition:  - Diet: clear liquids    #/Renal  - ESRD: HD on MWF. Most recent Scr 6.10 an increase from the previous labs. Potassium is stable. F/u HD tomorrow.     #Skin  - no active issues.    #ID  - SA PCR: positive. c/w Mupirocin  - BCx: Gram + cocci in clusters. Start Vancomycin.         #Endocrine  - DMII: c/w Lispro SS. Monitor glucose.    #Hematologic/DVT ppx  -     #Ethics        #Ethics MICU Accept Note    CHIEF COMPLAINT:     HPI / INTERVAL HISTORY:    69 yo M with PMhx of CAD s/p CABG (course at that time complicated by pleural effusion requiring chest tubes), ESRD on HD (MWF), DMII, HTN, afib on coumadin, and anemia presented from [facility] for evaluation of chest pain and GIB. Patient is AAOX2 and was able to provide some information. Most of the information was obtained from charting. Patient was admitted to [facility] after developing chest pain during HD. He was found to have Hgb of 6 and elevated trops concerning for GIB and NSTEM II. He was given 2 units of pRBCs and monitored off warfarin/aspirin. He underwent bowel prep but it did not show any bloody stool. As a result, no colonoscopy was performed. His course was complicated by aflutter/afib with RVR. He was managed with metoprolol and never required cardioversion. On day of transfer, his Hgb was in the 8s. Patient was deemed stable enough to be restarted on aspirin and warfarin. Patient was transferred to [facility] for further evaluation. Currently, patient have no symptoms but reports to have intermittent chest pain, that is suprasternal, non-radiating, exertional at times, and non-reproducible on palpation. it improves with rest and worsens with movement. He denies any fever, chills, cough, orthopnea, PND, LE edema, abdominal pain, diarrhea, or dysuria.  On the floor, vitals are WNL.     MICU was consulted yesterday due to hypotension to 80/51. Patient was asymptomatic, denying f/c, n/v, dizziness, HA, CP, SOB at rest, abd pain. Midodrine 30 mg was recommended. He received 20mg at 20:55 and 10mg at 00:00am. Today, the patient continued to be hypotensive, morning dose of Midodrine was not given due to parameters. Chest tubes were placed for loculated pleural effusion and Midodrine was given. At that time the patient became bradycardic and hypotensive, RRT was called due to multifactorial shock possibly 2/2 sepsis iso recent fluid removal from CT and possibly medication induced bradycardia. Pt persistently hypotensive and bradycardic during rapid. Patient then given atropine 0.5mg x 2 with no response. Given push of phenylephrine and started on norepinephrine with improvement of blood pressures. Patient transferred to MICU.      PAST MEDICAL & SURGICAL HISTORY:  HTN (Hypertension)    DM (Diabetes Mellitus)  x 4 yrs without N/N/R    CAD (Coronary Artery Disease)  Hypercholesterolemia  Coronary Stent  CABG in 2019  Heart Attack  2/1/07 with subsequent stent    H/O: CVA  after cardiac stent placed 12/15/09 -no residual    Hyperthyroidism    Stented coronary artery  total 15 stents from 4820-6130    ESRD (end stage renal disease) on dialysis  MWF    Boil of Buttock  2006 and 2008    S/P cataract extraction    Hemodialysis access, AV graft  5/2015, L arm    S/P CABG x 4      FAMILY HISTORY:  Family history of diabetes mellitus (Sibling)  Family history of coronary artery disease      SOCIAL HISTORY:  Smoking:   Substance Use:   EtOH Use:   Marital Status:   Sexual History:   Occupation:  Recent Travel:  Country of Birth:   Advance Directives:     HOME MEDICATIONS:      Allergies    lisinopril (Other)  opioid-like analgesics (Other)    Intolerances          REVIEW OF SYSTEMS:  Constitutional: No fevers, chills, weight loss, weight gain  HEENT: No vision problems, eye pain, nasal congestion, rhinorrhea, sore throat, dysphagia  CV: No chest pain, orthopnea, palpitations  Resp: + cough and chills, - dyspnea, wheezing, hemoptysis  GI: No nausea, vomiting, diarrhea, constipation, + abdominal pain  : [ ] dysuria [ ] nocturia [ ] hematuria [ ] increased urinary frequency  Musculoskeletal: [x ] back pain [ ] myalgias [ ] arthralgias [ ] fracture  Skin: [ ] rash [ ] itch  Neurological: [ ] headache [ ] dizziness [ ] syncope [ ] weakness [ ] numbness  Psychiatric: [ ] anxiety [ ] depression  Endocrine: [ ] diabetes [ ] thyroid problem  Hematologic/Lymphatic: [ ] anemia [ ] bleeding problem  Allergic/Immunologic: [ ] itchy eyes [ ] nasal discharge [ ] hives [ ] angioedema  [x ] All other systems negative  [ ] Unable to assess ROS because ________    OBJECTIVE:  ICU Vital Signs Last 24 Hrs  T(C): 36.7 (08 Jul 2021 12:00), Max: 38.8 (07 Jul 2021 17:41)  T(F): 98 (08 Jul 2021 12:00), Max: 101.9 (07 Jul 2021 17:41)  HR: 68 (08 Jul 2021 12:50) (68 - 113)  BP: 77/45 (08 Jul 2021 12:50) (77/45 - 137/69)  BP(mean): --  ABP: --  ABP(mean): --  RR: 18 (08 Jul 2021 12:50) (17 - 18)  SpO2: 96% (08 Jul 2021 12:50) (95% - 96%)        07-07 @ 07:01  -  07-08 @ 07:00  --------------------------------------------------------  IN: 880 mL / OUT: 1900 mL / NET: -1020 mL      CAPILLARY BLOOD GLUCOSE      POCT Blood Glucose.: 138 mg/dL (08 Jul 2021 13:56)      PHYSICAL EXAM:  GENERAL: NAD, sleepy but easily arousable.   HEENT: KATHY/ Atraumatic, Normocephalic  ENMT: No tonsillar erythema, exudates, or enlargement; Moist mucous membranes, No lesions  NECK: Supple, No JVD, Normal thyroid  CHEST/LUNG: CTA, no wheezes or rhonchis. Chest tube in left hemithorax.  CVS: Regular rate and rhythm; No murmurs, rubs, or gallops  GI: : Soft, Nontender, Nondistended; Bowel sounds present  NERVOUS SYSTEM:  Alert & Oriented X3  EXTREMITIES: + trace edema  LYMPH: No lymphadenopathy noted  SKIN: No rashes or lesions  ENDOCRINOLOGY: No Thyromegaly  PSYCH: Appropriate    LINES:     HOSPITAL MEDICATIONS:  MEDICATIONS  (STANDING):  aMIOdarone    Tablet 200 milliGRAM(s) Oral daily  aspirin  chewable 81 milliGRAM(s) Oral daily  atorvastatin 40 milliGRAM(s) Oral at bedtime  chlorhexidine 2% Cloths 1 Application(s) Topical daily  chlorhexidine 4% Liquid 1 Application(s) Topical <User Schedule>  dextrose 40% Gel 15 Gram(s) Oral once  dextrose 5%. 1000 milliLiter(s) (50 mL/Hr) IV Continuous <Continuous>  dextrose 5%. 1000 milliLiter(s) (100 mL/Hr) IV Continuous <Continuous>  dextrose 50% Injectable 25 Gram(s) IV Push once  dextrose 50% Injectable 12.5 Gram(s) IV Push once  dextrose 50% Injectable 25 Gram(s) IV Push once  epoetin danilo-epbx (RETACRIT) Injectable 72095 Unit(s) IV Push <User Schedule>  glucagon  Injectable 1 milliGRAM(s) IntraMuscular once  insulin lispro (ADMELOG) corrective regimen sliding scale   SubCutaneous three times a day before meals  insulin lispro (ADMELOG) corrective regimen sliding scale   SubCutaneous at bedtime  metoprolol tartrate 25 milliGRAM(s) Oral two times a day  midodrine. 20 milliGRAM(s) Oral three times a day  mupirocin 2% Ointment 1 Application(s) Topical two times a day  pantoprazole  Injectable 40 milliGRAM(s) IV Push every 12 hours  piperacillin/tazobactam IVPB.. 3.375 Gram(s) IV Intermittent every 12 hours  sodium chloride 0.9%. 1000 milliLiter(s) (50 mL/Hr) IV Continuous <Continuous>    MEDICATIONS  (PRN):  acetaminophen   Tablet .. 650 milliGRAM(s) Oral every 6 hours PRN Temp greater or equal to 38C (100.4F), Mild Pain (1 - 3)  calamine/zinc oxide Lotion 1 Application(s) Topical three times a day PRN Itching      LABS:                        10.6   11.98 )-----------( 171      ( 08 Jul 2021 12:21 )             34.7     Hgb Trend: 10.6<--, 10.6<--, 9.6<--, 9.3<--, 9.5<--    07-08    134<L>  |  95<L>  |  15  ----------------------------<  186<H>  3.9   |  24  |  6.10<H>    Ca    8.7      08 Jul 2021 12:32  Phos  3.4     07-08  Mg     1.70     07-08    TPro  6.5  /  Alb  3.5  /  TBili  0.9  /  DBili  x   /  AST  13  /  ALT  7   /  AlkPhos  71  07-08    Creatinine Trend: 6.10<--, TNP<--, 4.65<--, 7.48<--, 5.12<--, 5.01<--  PT/INR - ( 08 Jul 2021 12:21 )   PT: 19.1 sec;   INR: 1.72 ratio    PTT - ( 08 Jul 2021 12:21 )  PTT:28.7 sec      Venous Blood Gas:  07-08 @ 12:18  7.38/50/<24/26/14.2  VBG Lactate: --      MICROBIOLOGY:       RADIOLOGY & ADDITIONAL TESTS:  INTERPRETATION:  XR CHEST.   INDICATION: pleural effusion  IMPRESSION: Status post sternotomy. Stent graft projecting over the left subclavian region. Large left pleural effusion and underlying atelectasis. Pulmonary edema.        ASSESSMENT AND PLAN:  69 yo M with PMhx of CAD s/p CABG (course at that time complicated by pleural effusion requiring chest tubes), ESRD on HD (MW), DMII, HTN, afib on coumadin, and anemia transferred from [facility] for evaluation of chest pain after HD and GIB s/p 2U PRBC transfusion and aflutter/afib with RVR managed with metoprolol. Course complicated by hypotension likely 2/2 to sepsis.    #Neuro  - no active issues.    #Cardiovascular  - Hypotension: MICU was consulted one day prior to transfer for hypotension responsive to Midodrine and 500cc IV bolus. Today he presented bradycardia and worsening hypotension after placement of chest tube. He was given atropine, Chris and Levophed.  - TTE on 7/7: result    - c/w Levophed    - monitor HR and BP    #Respiratory  - Loculated Pleural effusions: Chest tube on left hemithorax draining serosanguineous fluid.    - f/u pleural fluid cx   -  currently saturating adequately on NC @       #GI/Nutrition:  - Diet: clear liquids, advance as tolerated   - C/o abdominal pain, bedside POCUS didn't show ascites. Continue to monitor.     #/Renal  - ESRD: HD on MWF. Most recent Scr 6.10 an increase from the previous labs. Potassium is stable. F/u HD tomorrow. Obtain urine cx if able to.     #Skin  - IV Line  - HD Access (fistula vs permacath)  - erythematous and tender on right wrist    #ID  - S aureus PCR: positive. c/w Mupirocin  - BCx: Gram + cocci in clusters. Continue Vancomycin by level.  - c/w Zosyn   - Serial blood Cx.       #Endocrine  - DMII: c/w Lispro SS. Monitor glucose.    #Hematologic/DVT ppx  - Heparin SQ q8h    #Ethics        #Ethics MICU Accept Note    CHIEF COMPLAINT:     HPI / INTERVAL HISTORY:    71 yo M with PMhx of CAD s/p CABG (course at that time complicated by pleural effusion requiring chest tubes), ESRD on HD (MWF), DMII, HTN, afib on coumadin, and anemia presented from [facility] for evaluation of chest pain and GIB. Patient is AAOX2 and was able to provide some information. Most of the information was obtained from charting. Patient was admitted to [facility] after developing chest pain during HD. He was found to have Hgb of 6 and elevated trops concerning for GIB and NSTEM II. He was given 2 units of pRBCs and monitored off warfarin/aspirin. He underwent bowel prep but it did not show any bloody stool. As a result, no colonoscopy was performed. His course was complicated by aflutter/afib with RVR. He was managed with metoprolol and never required cardioversion. On day of transfer, his Hgb was in the 8s. Patient was deemed stable enough to be restarted on aspirin and warfarin. Patient was transferred to [facility] for further evaluation. Currently, patient have no symptoms but reports to have intermittent chest pain, that is suprasternal, non-radiating, exertional at times, and non-reproducible on palpation. it improves with rest and worsens with movement. He denies any fever, chills, cough, orthopnea, PND, LE edema, abdominal pain, diarrhea, or dysuria.  On the floor, vitals are WNL.     MICU was consulted yesterday due to hypotension to 80/51. Patient was asymptomatic, denying f/c, n/v, dizziness, HA, CP, SOB at rest, abd pain. Midodrine 30 mg was recommended. He received 20mg at 20:55 and 10mg at 00:00am. Today, the patient continued to be hypotensive, morning dose of Midodrine was not given due to parameters. Chest tubes were placed for loculated pleural effusion and Midodrine was given. At that time the patient became bradycardic and hypotensive, RRT was called due to multifactorial shock possibly 2/2 sepsis iso recent fluid removal from CT and possibly medication induced bradycardia. Pt persistently hypotensive and bradycardic during rapid. Patient then given atropine 0.5mg x 2 with no response. Given push of phenylephrine and started on norepinephrine with improvement of blood pressures. Patient transferred to MICU.      PAST MEDICAL & SURGICAL HISTORY:  HTN (Hypertension)    DM (Diabetes Mellitus)  x 4 yrs without N/N/R    CAD (Coronary Artery Disease)  Hypercholesterolemia  Coronary Stent  CABG in 2019  Heart Attack  2/1/07 with subsequent stent    H/O: CVA  after cardiac stent placed 12/15/09 -no residual    Hyperthyroidism    Stented coronary artery  total 15 stents from [id number]    ESRD (end stage renal disease) on dialysis  MWF    Boil of Buttock  2006 and 2008    S/P cataract extraction    Hemodialysis access, AV graft  5/2015, L arm    S/P CABG x 4      FAMILY HISTORY:  Family history of diabetes mellitus (Sibling)  Family history of coronary artery disease      SOCIAL HISTORY:  Smoking:   Substance Use:   EtOH Use:   Marital Status:   Sexual History:   Occupation:  Recent Travel:  Country of Birth:   Advance Directives:     HOME MEDICATIONS:      Allergies    lisinopril (Other)  opioid-like analgesics (Other)    Intolerances          REVIEW OF SYSTEMS:  Constitutional: No fevers, chills, weight loss, weight gain  HEENT: No vision problems, eye pain, nasal congestion, rhinorrhea, sore throat, dysphagia  CV: No chest pain, orthopnea, palpitations  Resp: + cough and chills, - dyspnea, wheezing, hemoptysis  GI: No nausea, vomiting, diarrhea, constipation, + abdominal pain  : [ ] dysuria [ ] nocturia [ ] hematuria [ ] increased urinary frequency  Musculoskeletal: [x ] back pain [ ] myalgias [ ] arthralgias [ ] fracture  Skin: [ ] rash [ ] itch  Neurological: [ ] headache [ ] dizziness [ ] syncope [ ] weakness [ ] numbness  Psychiatric: [ ] anxiety [ ] depression  Endocrine: [ ] diabetes [ ] thyroid problem  Hematologic/Lymphatic: [ ] anemia [ ] bleeding problem  Allergic/Immunologic: [ ] itchy eyes [ ] nasal discharge [ ] hives [ ] angioedema  [x ] All other systems negative  [ ] Unable to assess ROS because ________    OBJECTIVE:  ICU Vital Signs Last 24 Hrs  T(C): 36.7 (08 Jul 2021 12:00), Max: 38.8 (07 Jul 2021 17:41)  T(F): 98 (08 Jul 2021 12:00), Max: 101.9 (07 Jul 2021 17:41)  HR: 68 (08 Jul 2021 12:50) (68 - 113)  BP: 77/45 (08 Jul 2021 12:50) (77/45 - 137/69)  BP(mean): --  ABP: --  ABP(mean): --  RR: 18 (08 Jul 2021 12:50) (17 - 18)  SpO2: 96% (08 Jul 2021 12:50) (95% - 96%)        07-07 @ 07:01  -  07-08 @ 07:00  --------------------------------------------------------  IN: 880 mL / OUT: 1900 mL / NET: -1020 mL      CAPILLARY BLOOD GLUCOSE      POCT Blood Glucose.: 138 mg/dL (08 Jul 2021 13:56)      PHYSICAL EXAM:  GENERAL: NAD, sleepy but easily arousable.   HEENT: KATHY/ Atraumatic, Normocephalic  ENMT: No tonsillar erythema, exudates, or enlargement; Moist mucous membranes, No lesions  NECK: Supple, No JVD, Normal thyroid  CHEST/LUNG: CTA, no wheezes or rhonchis. Chest tube in left hemithorax.  CVS: Regular rate and rhythm; No murmurs, rubs, or gallops  GI: : Soft, Nontender, Nondistended; Bowel sounds present  NERVOUS SYSTEM:  Alert & Oriented X3  EXTREMITIES: + trace edema  LYMPH: No lymphadenopathy noted  SKIN: No rashes or lesions  ENDOCRINOLOGY: No Thyromegaly  PSYCH: Appropriate    LINES:     HOSPITAL MEDICATIONS:  MEDICATIONS  (STANDING):  aMIOdarone    Tablet 200 milliGRAM(s) Oral daily  aspirin  chewable 81 milliGRAM(s) Oral daily  atorvastatin 40 milliGRAM(s) Oral at bedtime  chlorhexidine 2% Cloths 1 Application(s) Topical daily  chlorhexidine 4% Liquid 1 Application(s) Topical <User Schedule>  dextrose 40% Gel 15 Gram(s) Oral once  dextrose 5%. 1000 milliLiter(s) (50 mL/Hr) IV Continuous <Continuous>  dextrose 5%. 1000 milliLiter(s) (100 mL/Hr) IV Continuous <Continuous>  dextrose 50% Injectable 25 Gram(s) IV Push once  dextrose 50% Injectable 12.5 Gram(s) IV Push once  dextrose 50% Injectable 25 Gram(s) IV Push once  epoetin danilo-epbx (RETACRIT) Injectable 68087 Unit(s) IV Push <User Schedule>  glucagon  Injectable 1 milliGRAM(s) IntraMuscular once  insulin lispro (ADMELOG) corrective regimen sliding scale   SubCutaneous three times a day before meals  insulin lispro (ADMELOG) corrective regimen sliding scale   SubCutaneous at bedtime  metoprolol tartrate 25 milliGRAM(s) Oral two times a day  midodrine. 20 milliGRAM(s) Oral three times a day  mupirocin 2% Ointment 1 Application(s) Topical two times a day  pantoprazole  Injectable 40 milliGRAM(s) IV Push every 12 hours  piperacillin/tazobactam IVPB.. 3.375 Gram(s) IV Intermittent every 12 hours  sodium chloride 0.9%. 1000 milliLiter(s) (50 mL/Hr) IV Continuous <Continuous>    MEDICATIONS  (PRN):  acetaminophen   Tablet .. 650 milliGRAM(s) Oral every 6 hours PRN Temp greater or equal to 38C (100.4F), Mild Pain (1 - 3)  calamine/zinc oxide Lotion 1 Application(s) Topical three times a day PRN Itching      LABS:                        10.6   11.98 )-----------( 171      ( 08 Jul 2021 12:21 )             34.7     Hgb Trend: 10.6<--, 10.6<--, 9.6<--, 9.3<--, 9.5<--    07-08    134<L>  |  95<L>  |  15  ----------------------------<  186<H>  3.9   |  24  |  6.10<H>    Ca    8.7      08 Jul 2021 12:32  Phos  3.4     07-08  Mg     1.70     07-08    TPro  6.5  /  Alb  3.5  /  TBili  0.9  /  DBili  x   /  AST  13  /  ALT  7   /  AlkPhos  71  07-08    Creatinine Trend: 6.10<--, TNP<--, 4.65<--, 7.48<--, 5.12<--, 5.01<--  PT/INR - ( 08 Jul 2021 12:21 )   PT: 19.1 sec;   INR: 1.72 ratio    PTT - ( 08 Jul 2021 12:21 )  PTT:28.7 sec      Venous Blood Gas:  07-08 @ 12:18  7.38/50/<24/26/14.2  VBG Lactate: --      MICROBIOLOGY:       RADIOLOGY & ADDITIONAL TESTS:  INTERPRETATION:  XR CHEST.   INDICATION: pleural effusion  IMPRESSION: Status post sternotomy. Stent graft projecting over the left subclavian region. Large left pleural effusion and underlying atelectasis. Pulmonary edema.        ASSESSMENT AND PLAN:  71 yo M with PMhx of CAD s/p CABG (course at that time complicated by pleural effusion requiring chest tubes), ESRD on HD (MWF), DMII, HTN, afib on coumadin, and anemia transferred from [facility] for evaluation of chest pain after HD and GIB s/p 2U PRBC transfusion and aflutter/afib with RVR managed with metoprolol. Course complicated by hypotension likely 2/2 to sepsis.    #Neuro  - no active issues.    #Cardiovascular  - Hypotension: MICU was consulted one day prior to transfer for hypotension responsive to Midodrine and 500cc IV bolus. Today he presented bradycardia and worsening hypotension after placement of chest tube. He was given atropine, Chris and Levophed.  - TTE on 7/7: result    - c/w Levophed    - monitor HR and BP    #Respiratory  - Loculated Pleural effusions: Chest tube on left hemithorax draining serosanguineous fluid.    - f/u pleural fluid cx   -  currently saturating adequately on NC @ 4L      #GI/Nutrition:  - Diet: clear liquids, advance as tolerated   - C/o abdominal pain, bedside POCUS didn't show ascites. Continue to monitor.     #/Renal  - ESRD: HD on MWF. Most recent Scr 6.10 an increase from the previous labs. Potassium is stable. F/u HD tomorrow. Obtain urine cx if able to.     #Skin  - IV Line:  - HD Access: fistula in RT upper arm  - erythematous and tender on right wrist    #ID  - S aureus PCR: (+) MRSA (-) c/w Mupirocin  - BCx: Gram + cocci in clusters. Continue Vancomycin by level.  - c/w Zosyn   - Serial blood Cx.       #Endocrine  - DMII: c/w Lispro SS. Monitor glucose.    #Hematologic/DVT ppx  - Heparin SQ q8h    #Ethics MICU Accept Note    CHIEF COMPLAINT:     HPI / INTERVAL HISTORY:    69 yo M with PMhx of CAD s/p CABG (course at that time complicated by pleural effusion requiring chest tubes), ESRD on HD (MWF), DMII, HTN, afib on coumadin, and anemia presented from [facility] for evaluation of chest pain and GIB. Patient is AAOX2 and was able to provide some information. Most of the information was obtained from charting. Patient was admitted to [facility] after developing chest pain during HD. He was found to have Hgb of 6 and elevated trops concerning for GIB and NSTEM II. He was given 2 units of pRBCs and monitored off warfarin/aspirin. He underwent bowel prep but it did not show any bloody stool. As a result, no colonoscopy was performed. His course was complicated by aflutter/afib with RVR. He was managed with metoprolol and never required cardioversion. On day of transfer, his Hgb was in the 8s. Patient was deemed stable enough to be restarted on aspirin and warfarin. Patient was transferred to [facility] for further evaluation. Currently, patient have no symptoms but reports to have intermittent chest pain, that is suprasternal, non-radiating, exertional at times, and non-reproducible on palpation. it improves with rest and worsens with movement. He denies any fever, chills, cough, orthopnea, PND, LE edema, abdominal pain, diarrhea, or dysuria.  On the floor, vitals are WNL.     MICU was consulted yesterday due to hypotension to 80/51. Patient was asymptomatic, denying f/c, n/v, dizziness, HA, CP, SOB at rest, abd pain. Midodrine 30 mg was recommended. He received 20mg at 20:55 and 10mg at 00:00am. Today, the patient continued to be hypotensive, morning dose of Midodrine was not given due to parameters. Chest tubes were placed for loculated pleural effusion and Midodrine was given. At that time the patient became bradycardic and hypotensive, RRT was called due to multifactorial shock possibly 2/2 sepsis iso recent fluid removal from CT and possibly medication induced bradycardia. Pt persistently hypotensive and bradycardic during rapid. Patient then given atropine 0.5mg x 2 with no response. Given push of phenylephrine and started on norepinephrine with improvement of blood pressures. Patient transferred to MICU.      PAST MEDICAL & SURGICAL HISTORY:  HTN (Hypertension)    DM (Diabetes Mellitus)  x 4 yrs without N/N/R    CAD (Coronary Artery Disease)  Hypercholesterolemia  Coronary Stent  CABG in 2019  Heart Attack  2/1/07 with subsequent stent    H/O: CVA  after cardiac stent placed 12/15/09 -no residual    Hyperthyroidism    Stented coronary artery  total 15 stents from [id number]    ESRD (end stage renal disease) on dialysis  MWF    Boil of Buttock  2006 and 2008    S/P cataract extraction    Hemodialysis access, AV graft  5/2015, L arm    S/P CABG x 4      FAMILY HISTORY:  Family history of diabetes mellitus (Sibling)  Family history of coronary artery disease      SOCIAL HISTORY:  Lives w/ son. Denied alcohol, tobacco and drug use. Can perform some ADLs. .    HOME MEDICATIONS:  Tylenol  Amiodarone  Coumadin  Atorvastatin  Lopressor  Methimazole  Darbipoetin  ASA     Allergies    lisinopril (Other)  opioid-like analgesics (Other)    Intolerances          REVIEW OF SYSTEMS:  Constitutional: No fevers, chills, weight loss, weight gain  HEENT: No vision problems, eye pain, nasal congestion, rhinorrhea, sore throat, dysphagia  CV: No chest pain, orthopnea, palpitations  Resp: + cough and chills, - dyspnea, wheezing, hemoptysis  GI: No nausea, vomiting, diarrhea, constipation, + abdominal pain  : [ ] dysuria [ ] nocturia [ ] hematuria [ ] increased urinary frequency  Musculoskeletal: [x ] back pain [ ] myalgias [ ] arthralgias [ ] fracture  Skin: [ ] rash [ ] itch  Neurological: [ ] headache [ ] dizziness [ ] syncope [ ] weakness [ ] numbness  Psychiatric: [ ] anxiety [ ] depression  Endocrine: [ ] diabetes [ ] thyroid problem  Hematologic/Lymphatic: [ ] anemia [ ] bleeding problem  Allergic/Immunologic: [ ] itchy eyes [ ] nasal discharge [ ] hives [ ] angioedema  [x ] All other systems negative  [ ] Unable to assess ROS because ________    OBJECTIVE:  ICU Vital Signs Last 24 Hrs  T(C): 36.7 (08 Jul 2021 12:00), Max: 38.8 (07 Jul 2021 17:41)  T(F): 98 (08 Jul 2021 12:00), Max: 101.9 (07 Jul 2021 17:41)  HR: 68 (08 Jul 2021 12:50) (68 - 113)  BP: 77/45 (08 Jul 2021 12:50) (77/45 - 137/69)  BP(mean): --  ABP: --  ABP(mean): --  RR: 18 (08 Jul 2021 12:50) (17 - 18)  SpO2: 96% (08 Jul 2021 12:50) (95% - 96%)        07-07 @ 07:01  -  07-08 @ 07:00  --------------------------------------------------------  IN: 880 mL / OUT: 1900 mL / NET: -1020 mL      CAPILLARY BLOOD GLUCOSE      POCT Blood Glucose.: 138 mg/dL (08 Jul 2021 13:56)      PHYSICAL EXAM:  GENERAL: NAD, sleepy but easily arousable.   HEENT: KATHY/ Atraumatic, Normocephalic  ENMT: No tonsillar erythema, exudates, or enlargement; Moist mucous membranes, No lesions  NECK: Supple, No JVD, Normal thyroid  CHEST/LUNG: CTA, no wheezes or rhonchis. Chest tube in left hemithorax.  CVS: Regular rate and rhythm; No murmurs, rubs, or gallops  GI: : Soft, Nontender, Nondistended; Bowel sounds present  NERVOUS SYSTEM:  Alert & Oriented X3  EXTREMITIES: + trace edema  LYMPH: No lymphadenopathy noted  SKIN: No rashes or lesions. left upper arm fistula.   ENDOCRINOLOGY: No Thyromegaly  PSYCH: Appropriate    LINES:     HOSPITAL MEDICATIONS:  MEDICATIONS  (STANDING):  aMIOdarone    Tablet 200 milliGRAM(s) Oral daily  aspirin  chewable 81 milliGRAM(s) Oral daily  atorvastatin 40 milliGRAM(s) Oral at bedtime  chlorhexidine 2% Cloths 1 Application(s) Topical daily  chlorhexidine 4% Liquid 1 Application(s) Topical <User Schedule>  dextrose 40% Gel 15 Gram(s) Oral once  dextrose 5%. 1000 milliLiter(s) (50 mL/Hr) IV Continuous <Continuous>  dextrose 5%. 1000 milliLiter(s) (100 mL/Hr) IV Continuous <Continuous>  dextrose 50% Injectable 25 Gram(s) IV Push once  dextrose 50% Injectable 12.5 Gram(s) IV Push once  dextrose 50% Injectable 25 Gram(s) IV Push once  epoetin danilo-epbx (RETACRIT) Injectable 30224 Unit(s) IV Push <User Schedule>  glucagon  Injectable 1 milliGRAM(s) IntraMuscular once  insulin lispro (ADMELOG) corrective regimen sliding scale   SubCutaneous three times a day before meals  insulin lispro (ADMELOG) corrective regimen sliding scale   SubCutaneous at bedtime  metoprolol tartrate 25 milliGRAM(s) Oral two times a day  midodrine. 20 milliGRAM(s) Oral three times a day  mupirocin 2% Ointment 1 Application(s) Topical two times a day  pantoprazole  Injectable 40 milliGRAM(s) IV Push every 12 hours  piperacillin/tazobactam IVPB.. 3.375 Gram(s) IV Intermittent every 12 hours  sodium chloride 0.9%. 1000 milliLiter(s) (50 mL/Hr) IV Continuous <Continuous>    MEDICATIONS  (PRN):  acetaminophen   Tablet .. 650 milliGRAM(s) Oral every 6 hours PRN Temp greater or equal to 38C (100.4F), Mild Pain (1 - 3)  calamine/zinc oxide Lotion 1 Application(s) Topical three times a day PRN Itching      LABS:                        10.6   11.98 )-----------( 171      ( 08 Jul 2021 12:21 )             34.7     Hgb Trend: 10.6<--, 10.6<--, 9.6<--, 9.3<--, 9.5<--    07-08    134<L>  |  95<L>  |  15  ----------------------------<  186<H>  3.9   |  24  |  6.10<H>    Ca    8.7      08 Jul 2021 12:32  Phos  3.4     07-08  Mg     1.70     07-08    TPro  6.5  /  Alb  3.5  /  TBili  0.9  /  DBili  x   /  AST  13  /  ALT  7   /  AlkPhos  71  07-08    Creatinine Trend: 6.10<--, TNP<--, 4.65<--, 7.48<--, 5.12<--, 5.01<--  PT/INR - ( 08 Jul 2021 12:21 )   PT: 19.1 sec;   INR: 1.72 ratio    PTT - ( 08 Jul 2021 12:21 )  PTT:28.7 sec      Venous Blood Gas:  07-08 @ 12:18  7.38/50/<24/26/14.2  VBG Lactate: --      MICROBIOLOGY:       RADIOLOGY & ADDITIONAL TESTS:  INTERPRETATION:  XR CHEST.   INDICATION: pleural effusion  IMPRESSION: Status post sternotomy. Stent graft projecting over the left subclavian region. Large left pleural effusion and underlying atelectasis. Pulmonary edema.        ASSESSMENT AND PLAN:  69 yo M with PMhx of CAD s/p CABG (course at that time complicated by pleural effusion requiring chest tubes), ESRD on HD (MW), DMII, HTN, afib on coumadin, and anemia transferred from [facility] for evaluation of chest pain after HD and GIB s/p 2U PRBC transfusion and aflutter/afib with RVR managed with metoprolol. Course complicated by hypotension likely 2/2 to sepsis.    #Neuro  - no active issues.    #Cardiovascular  - Hypotension: MICU was consulted one day prior to transfer for hypotension responsive to Midodrine and 500cc IV bolus. Today he presented bradycardia and worsening hypotension after placement of chest tube. He was given atropine, Chris and Levophed.  - TTE on 7/7: result    - c/w Levophed wean as tolerated    - monitor HR and BP    #Respiratory  - Loculated Pleural effusions: Chest tube on left hemithorax draining serosanguineous fluid. Currently clamped at 5pm 7/8.   - f/u pleural fluid cx   -  currently saturating adequately on NC @ 4L. Wean as tolerated.      #GI/Nutrition:  - Diet: clear liquids, advance as tolerated   - C/o abdominal pain, bedside POCUS didn't show ascites. Continue to monitor.     #/Renal  - ESRD: HD on MWF. Most recent Scr 6.10 an increase from the previous labs. Potassium is stable. F/u HD tomorrow. Obtain urine cx and U/A if able to.     #Skin  - IV Line:  - HD Access: fistula in left upper arm  - erythematous and tender on right wrist. Continue to monitor. Consider XR if persists/worsen.    #ID  - S aureus PCR: (+) MRSA (-) c/w Mupirocin  - BCx: Gram + cocci in clusters. Continue Vancomycin by level.  - c/w Zosyn   - Serial blood Cx.       #Endocrine  - DMII: c/w Lispro SS. Monitor glucose.    #Hematologic/DVT ppx  - Heparin SQ q8h  - Discuss w/ GI regarding when its ok to start full A/C for afib MICU Accept Note    CHIEF COMPLAINT:     HPI / INTERVAL HISTORY:    71 yo M with PMhx of CAD s/p CABG (course at that time complicated by pleural effusion requiring chest tubes), ESRD on HD (MWF), DMII, HTN, afib on coumadin, and anemia presented from [facility] for evaluation of chest pain and GIB. Patient is AAOX2 and was able to provide some information. Most of the information was obtained from charting. Patient was admitted to [facility] after developing chest pain during HD. He was found to have Hgb of 6 and elevated trops concerning for GIB and NSTEM II. He was given 2 units of pRBCs and monitored off warfarin/aspirin. He underwent bowel prep but it did not show any bloody stool. As a result, no colonoscopy was performed. His course was complicated by aflutter/afib with RVR. He was managed with metoprolol and never required cardioversion. On day of transfer, his Hgb was in the 8s. Patient was deemed stable enough to be restarted on aspirin and warfarin. Patient was transferred to [facility] for further evaluation. Currently, patient have no symptoms but reports to have intermittent chest pain, that is suprasternal, non-radiating, exertional at times, and non-reproducible on palpation. it improves with rest and worsens with movement. He denies any fever, chills, cough, orthopnea, PND, LE edema, abdominal pain, diarrhea, or dysuria.  On the floor, vitals are WNL.     MICU was consulted yesterday due to hypotension to 80/51. Patient was asymptomatic, denying f/c, n/v, dizziness, HA, CP, SOB at rest, abd pain. Midodrine 30 mg was recommended. He received 20mg at 20:55 and 10mg at 00:00am. Today, the patient continued to be hypotensive, morning dose of Midodrine was not given due to parameters. Chest tubes were placed for loculated pleural effusion and Midodrine was given. At that time the patient became bradycardic and hypotensive, RRT was called due to multifactorial shock possibly 2/2 sepsis iso recent fluid removal from CT and possibly medication induced bradycardia. Pt persistently hypotensive and bradycardic during rapid. Patient then given atropine 0.5mg x 2 with no response. Given push of phenylephrine and started on norepinephrine with improvement of blood pressures. Patient transferred to MICU.      PAST MEDICAL & SURGICAL HISTORY:  HTN (Hypertension)    DM (Diabetes Mellitus)  x 4 yrs without N/N/R    CAD (Coronary Artery Disease)  Hypercholesterolemia  Coronary Stent  CABG in 2019  Heart Attack  2/1/07 with subsequent stent    H/O: CVA  after cardiac stent placed 12/15/09 -no residual    Hyperthyroidism    Stented coronary artery  total 15 stents from 7025-7553    ESRD (end stage renal disease) on dialysis  MWF    Boil of Buttock  2006 and 2008    S/P cataract extraction    Hemodialysis access, AV graft  5/2015, L arm    S/P CABG x 4      FAMILY HISTORY:  Family history of diabetes mellitus (Sibling)  Family history of coronary artery disease      SOCIAL HISTORY:  Lives w/ son. Denied alcohol, tobacco and drug use. Can perform some ADLs. .    HOME MEDICATIONS:  Tylenol  Amiodarone  Coumadin  Atorvastatin  Lopressor  Methimazole  Darbipoetin  ASA     Allergies    lisinopril (Other)  opioid-like analgesics (Other)    Intolerances          REVIEW OF SYSTEMS:  Constitutional: No fevers, chills, weight loss, weight gain  HEENT: No vision problems, eye pain, nasal congestion, rhinorrhea, sore throat, dysphagia  CV: No chest pain, orthopnea, palpitations  Resp: + cough and chills, - dyspnea, wheezing, hemoptysis  GI: No nausea, vomiting, diarrhea, constipation, + abdominal pain  : [ ] dysuria [ ] nocturia [ ] hematuria [ ] increased urinary frequency  Musculoskeletal: [x ] back pain [ ] myalgias [ ] arthralgias [ ] fracture  Skin: [ ] rash [ ] itch  Neurological: [ ] headache [ ] dizziness [ ] syncope [ ] weakness [ ] numbness  Psychiatric: [ ] anxiety [ ] depression  Endocrine: [ ] diabetes [ ] thyroid problem  Hematologic/Lymphatic: [ ] anemia [ ] bleeding problem  Allergic/Immunologic: [ ] itchy eyes [ ] nasal discharge [ ] hives [ ] angioedema  [x ] All other systems negative  [ ] Unable to assess ROS because ________    OBJECTIVE:  ICU Vital Signs Last 24 Hrs  T(C): 36.7 (08 Jul 2021 12:00), Max: 38.8 (07 Jul 2021 17:41)  T(F): 98 (08 Jul 2021 12:00), Max: 101.9 (07 Jul 2021 17:41)  HR: 68 (08 Jul 2021 12:50) (68 - 113)  BP: 77/45 (08 Jul 2021 12:50) (77/45 - 137/69)  BP(mean): --  ABP: --  ABP(mean): --  RR: 18 (08 Jul 2021 12:50) (17 - 18)  SpO2: 96% (08 Jul 2021 12:50) (95% - 96%)        07-07 @ 07:01  -  07-08 @ 07:00  --------------------------------------------------------  IN: 880 mL / OUT: 1900 mL / NET: -1020 mL      CAPILLARY BLOOD GLUCOSE      POCT Blood Glucose.: 138 mg/dL (08 Jul 2021 13:56)      PHYSICAL EXAM:  GENERAL: NAD, sleepy but easily arousable.   HEENT: KATHY/ Atraumatic, Normocephalic  ENMT: No tonsillar erythema, exudates, or enlargement; Moist mucous membranes, No lesions  NECK: Supple, No JVD, Normal thyroid  CHEST/LUNG: CTA, no wheezes or rhonchis. Chest tube in left hemithorax.  CVS: Regular rate and rhythm; No murmurs, rubs, or gallops  GI: : Soft, Nontender, Nondistended; Bowel sounds present  NERVOUS SYSTEM:  Alert & Oriented X3  EXTREMITIES: + trace edema  LYMPH: No lymphadenopathy noted  SKIN: No rashes or lesions. left upper arm fistula.   ENDOCRINOLOGY: No Thyromegaly  PSYCH: Appropriate    LINES:     HOSPITAL MEDICATIONS:  MEDICATIONS  (STANDING):  aMIOdarone    Tablet 200 milliGRAM(s) Oral daily  aspirin  chewable 81 milliGRAM(s) Oral daily  atorvastatin 40 milliGRAM(s) Oral at bedtime  chlorhexidine 2% Cloths 1 Application(s) Topical daily  chlorhexidine 4% Liquid 1 Application(s) Topical <User Schedule>  dextrose 40% Gel 15 Gram(s) Oral once  dextrose 5%. 1000 milliLiter(s) (50 mL/Hr) IV Continuous <Continuous>  dextrose 5%. 1000 milliLiter(s) (100 mL/Hr) IV Continuous <Continuous>  dextrose 50% Injectable 25 Gram(s) IV Push once  dextrose 50% Injectable 12.5 Gram(s) IV Push once  dextrose 50% Injectable 25 Gram(s) IV Push once  epoetin danilo-epbx (RETACRIT) Injectable 02539 Unit(s) IV Push <User Schedule>  glucagon  Injectable 1 milliGRAM(s) IntraMuscular once  insulin lispro (ADMELOG) corrective regimen sliding scale   SubCutaneous three times a day before meals  insulin lispro (ADMELOG) corrective regimen sliding scale   SubCutaneous at bedtime  metoprolol tartrate 25 milliGRAM(s) Oral two times a day  midodrine. 20 milliGRAM(s) Oral three times a day  mupirocin 2% Ointment 1 Application(s) Topical two times a day  pantoprazole  Injectable 40 milliGRAM(s) IV Push every 12 hours  piperacillin/tazobactam IVPB.. 3.375 Gram(s) IV Intermittent every 12 hours  sodium chloride 0.9%. 1000 milliLiter(s) (50 mL/Hr) IV Continuous <Continuous>    MEDICATIONS  (PRN):  acetaminophen   Tablet .. 650 milliGRAM(s) Oral every 6 hours PRN Temp greater or equal to 38C (100.4F), Mild Pain (1 - 3)  calamine/zinc oxide Lotion 1 Application(s) Topical three times a day PRN Itching      LABS:                        10.6   11.98 )-----------( 171      ( 08 Jul 2021 12:21 )             34.7     Hgb Trend: 10.6<--, 10.6<--, 9.6<--, 9.3<--, 9.5<--    07-08    134<L>  |  95<L>  |  15  ----------------------------<  186<H>  3.9   |  24  |  6.10<H>    Ca    8.7      08 Jul 2021 12:32  Phos  3.4     07-08  Mg     1.70     07-08    TPro  6.5  /  Alb  3.5  /  TBili  0.9  /  DBili  x   /  AST  13  /  ALT  7   /  AlkPhos  71  07-08    Creatinine Trend: 6.10<--, TNP<--, 4.65<--, 7.48<--, 5.12<--, 5.01<--  PT/INR - ( 08 Jul 2021 12:21 )   PT: 19.1 sec;   INR: 1.72 ratio    PTT - ( 08 Jul 2021 12:21 )  PTT:28.7 sec      Venous Blood Gas:  07-08 @ 12:18  7.38/50/<24/26/14.2  VBG Lactate: --      MICROBIOLOGY:       RADIOLOGY & ADDITIONAL TESTS:  INTERPRETATION:  XR CHEST.   INDICATION: pleural effusion  IMPRESSION: Status post sternotomy. Stent graft projecting over the left subclavian region. Large left pleural effusion and underlying atelectasis. Pulmonary edema.        ASSESSMENT AND PLAN:  71 yo M with PMhx of CAD s/p CABG (course at that time complicated by pleural effusion requiring chest tubes), ESRD on HD (MW), DMII, HTN, afib on coumadin, and anemia transferred from [facility] for evaluation of chest pain after HD and GIB s/p 2U PRBC transfusion and aflutter/afib with RVR managed with metoprolol. Course complicated by hypotension likely 2/2 to sepsis.    #Neuro  - no active issues.    #Cardiovascular  - Hypotension: MICU was consulted one day prior to transfer for hypotension responsive to Midodrine and 500cc IV bolus. Today he presented bradycardia and worsening hypotension after placement of chest tube. He was given atropine, Chris and Levophed.  - TTE on 7/7: result    - c/w Levophed wean as tolerated    - monitor HR and BP    - d/c Midodrine    #Respiratory  - Loculated Pleural effusions: Chest tube on left hemithorax draining serosanguineous fluid. Currently clamped at 5pm 7/8.   - f/u pleural fluid cx   - Pleural fluid: transudative.   -  currently saturating adequately on NC @ 4L. Wean as tolerated.      #GI/Nutrition:  - Diet: clear liquids, advance as tolerated   - C/o abdominal pain, bedside POCUS didn't show ascites. Continue to monitor.     #/Renal  - ESRD: HD on MWF. Most recent Scr 6.10 an increase from the previous labs. Potassium is stable. F/u HD tomorrow. Obtain urine cx and U/A if able to.     #Skin  - IV Line:  - HD Access: fistula in left upper arm  - erythematous and tender on right wrist. Continue to monitor. Consider XR if persists/worsen.    #ID  - S aureus PCR: (+) MRSA (-) c/w Mupirocin  - BCx: Gram + cocci in clusters. Continue Vancomycin by level.  - c/w Zosyn   - Serial blood Cx.       #Endocrine  - DMII: c/w Lispro SS. Monitor glucose.    #Hematologic/DVT ppx  - Heparin SQ q8h  - Discuss w/ GI regarding when its ok to start full A/C for afib MICU Accept Note    CHIEF COMPLAINT:     HPI / INTERVAL HISTORY:    69 yo M with PMhx of CAD s/p CABG (course at that time complicated by pleural effusion requiring chest tubes), ESRD on HD (MWF), DMII, HTN, afib on coumadin, and anemia presented from [facility] for evaluation of chest pain and GIB. Patient is AAOX2 and was able to provide some information. Most of the information was obtained from charting. Patient was admitted to [facility] after developing chest pain during HD. He was found to have Hgb of 6 and elevated trops concerning for GIB and NSTEM II. He was given 2 units of pRBCs and monitored off warfarin/aspirin. He underwent bowel prep but it did not show any bloody stool. As a result, no colonoscopy was performed. His course was complicated by aflutter/afib with RVR. He was managed with metoprolol and never required cardioversion. On day of transfer, his Hgb was in the 8s. Patient was deemed stable enough to be restarted on aspirin and warfarin. Patient was transferred to [facility] for further evaluation. Currently, patient have no symptoms but reports to have intermittent chest pain, that is suprasternal, non-radiating, exertional at times, and non-reproducible on palpation. it improves with rest and worsens with movement. He denies any fever, chills, cough, orthopnea, PND, LE edema, abdominal pain, diarrhea, or dysuria.  On the floor, vitals are WNL.     MICU was consulted yesterday due to hypotension to 80/51. Patient was asymptomatic, denying f/c, n/v, dizziness, HA, CP, SOB at rest, abd pain. Midodrine 30 mg was recommended. He received 20mg at 20:55 and 10mg at 00:00am. Today, the patient continued to be hypotensive, morning dose of Midodrine was not given due to parameters. Chest tubes were placed for loculated pleural effusion and Midodrine was given. At that time the patient became bradycardic and hypotensive, RRT was called due to multifactorial shock possibly 2/2 sepsis iso recent fluid removal from CT and possibly medication induced bradycardia. Pt persistently hypotensive and bradycardic during rapid. Patient then given atropine 0.5mg x 2 with no response. Given push of phenylephrine and started on norepinephrine with improvement of blood pressures. Patient transferred to MICU.      PAST MEDICAL & SURGICAL HISTORY:  HTN (Hypertension)    DM (Diabetes Mellitus)  x 4 yrs without N/N/R    CAD (Coronary Artery Disease)  Hypercholesterolemia  Coronary Stent  CABG in 2019  Heart Attack  2/1/07 with subsequent stent    H/O: CVA  after cardiac stent placed 12/15/09 -no residual    Hyperthyroidism    Stented coronary artery  total 15 stents from 8311-5255    ESRD (end stage renal disease) on dialysis  MWF    Boil of Buttock  2006 and 2008    S/P cataract extraction    Hemodialysis access, AV graft  5/2015, L arm    S/P CABG x 4      FAMILY HISTORY:  Family history of diabetes mellitus (Sibling)  Family history of coronary artery disease      SOCIAL HISTORY:  Lives w/ son. Denied alcohol, tobacco and drug use. Can perform some ADLs. .    HOME MEDICATIONS:  Tylenol  Amiodarone  Coumadin  Atorvastatin  Lopressor  Methimazole  Darbipoetin  ASA     Allergies    lisinopril (Other)  opioid-like analgesics (Other)    Intolerances          REVIEW OF SYSTEMS:  Constitutional: No fevers, chills, weight loss, weight gain  HEENT: No vision problems, eye pain, nasal congestion, rhinorrhea, sore throat, dysphagia  CV: No chest pain, orthopnea, palpitations  Resp: + cough and chills, - dyspnea, wheezing, hemoptysis  GI: No nausea, vomiting, diarrhea, constipation, + abdominal pain  : [ ] dysuria [ ] nocturia [ ] hematuria [ ] increased urinary frequency  Musculoskeletal: [x ] back pain [ ] myalgias [ ] arthralgias [ ] fracture  Skin: [ ] rash [ ] itch  Neurological: [ ] headache [ ] dizziness [ ] syncope [ ] weakness [ ] numbness  Psychiatric: [ ] anxiety [ ] depression  Endocrine: [ ] diabetes [ ] thyroid problem  Hematologic/Lymphatic: [ ] anemia [ ] bleeding problem  Allergic/Immunologic: [ ] itchy eyes [ ] nasal discharge [ ] hives [ ] angioedema  [x ] All other systems negative  [ ] Unable to assess ROS because ________    OBJECTIVE:  ICU Vital Signs Last 24 Hrs  T(C): 36.7 (08 Jul 2021 12:00), Max: 38.8 (07 Jul 2021 17:41)  T(F): 98 (08 Jul 2021 12:00), Max: 101.9 (07 Jul 2021 17:41)  HR: 68 (08 Jul 2021 12:50) (68 - 113)  BP: 77/45 (08 Jul 2021 12:50) (77/45 - 137/69)  BP(mean): --  ABP: --  ABP(mean): --  RR: 18 (08 Jul 2021 12:50) (17 - 18)  SpO2: 96% (08 Jul 2021 12:50) (95% - 96%)        07-07 @ 07:01  -  07-08 @ 07:00  --------------------------------------------------------  IN: 880 mL / OUT: 1900 mL / NET: -1020 mL      CAPILLARY BLOOD GLUCOSE      POCT Blood Glucose.: 138 mg/dL (08 Jul 2021 13:56)      PHYSICAL EXAM:  GENERAL: NAD, sleepy but easily arousable.   HEENT: KATHY/ Atraumatic, Normocephalic  ENMT: No tonsillar erythema, exudates, or enlargement; Moist mucous membranes, No lesions  NECK: Supple, No JVD, Normal thyroid  CHEST/LUNG: CTA, no wheezes or rhonchis. Chest tube in left hemithorax.  CVS: Regular rate and rhythm; No murmurs, rubs, or gallops  GI: : Soft, Nontender, Nondistended; Bowel sounds present  NERVOUS SYSTEM:  Alert & Oriented X3  EXTREMITIES: + trace edema  LYMPH: No lymphadenopathy noted  SKIN: No rashes or lesions. left upper arm fistula.   ENDOCRINOLOGY: No Thyromegaly  PSYCH: Appropriate    LINES:     HOSPITAL MEDICATIONS:  MEDICATIONS  (STANDING):  aMIOdarone    Tablet 200 milliGRAM(s) Oral daily  aspirin  chewable 81 milliGRAM(s) Oral daily  atorvastatin 40 milliGRAM(s) Oral at bedtime  chlorhexidine 2% Cloths 1 Application(s) Topical daily  chlorhexidine 4% Liquid 1 Application(s) Topical <User Schedule>  dextrose 40% Gel 15 Gram(s) Oral once  dextrose 5%. 1000 milliLiter(s) (50 mL/Hr) IV Continuous <Continuous>  dextrose 5%. 1000 milliLiter(s) (100 mL/Hr) IV Continuous <Continuous>  dextrose 50% Injectable 25 Gram(s) IV Push once  dextrose 50% Injectable 12.5 Gram(s) IV Push once  dextrose 50% Injectable 25 Gram(s) IV Push once  epoetin danilo-epbx (RETACRIT) Injectable 60937 Unit(s) IV Push <User Schedule>  glucagon  Injectable 1 milliGRAM(s) IntraMuscular once  insulin lispro (ADMELOG) corrective regimen sliding scale   SubCutaneous three times a day before meals  insulin lispro (ADMELOG) corrective regimen sliding scale   SubCutaneous at bedtime  metoprolol tartrate 25 milliGRAM(s) Oral two times a day  midodrine. 20 milliGRAM(s) Oral three times a day  mupirocin 2% Ointment 1 Application(s) Topical two times a day  pantoprazole  Injectable 40 milliGRAM(s) IV Push every 12 hours  piperacillin/tazobactam IVPB.. 3.375 Gram(s) IV Intermittent every 12 hours  sodium chloride 0.9%. 1000 milliLiter(s) (50 mL/Hr) IV Continuous <Continuous>    MEDICATIONS  (PRN):  acetaminophen   Tablet .. 650 milliGRAM(s) Oral every 6 hours PRN Temp greater or equal to 38C (100.4F), Mild Pain (1 - 3)  calamine/zinc oxide Lotion 1 Application(s) Topical three times a day PRN Itching      LABS:                        10.6   11.98 )-----------( 171      ( 08 Jul 2021 12:21 )             34.7     Hgb Trend: 10.6<--, 10.6<--, 9.6<--, 9.3<--, 9.5<--    07-08    134<L>  |  95<L>  |  15  ----------------------------<  186<H>  3.9   |  24  |  6.10<H>    Ca    8.7      08 Jul 2021 12:32  Phos  3.4     07-08  Mg     1.70     07-08    TPro  6.5  /  Alb  3.5  /  TBili  0.9  /  DBili  x   /  AST  13  /  ALT  7   /  AlkPhos  71  07-08    Creatinine Trend: 6.10<--, TNP<--, 4.65<--, 7.48<--, 5.12<--, 5.01<--  PT/INR - ( 08 Jul 2021 12:21 )   PT: 19.1 sec;   INR: 1.72 ratio    PTT - ( 08 Jul 2021 12:21 )  PTT:28.7 sec      Venous Blood Gas:  07-08 @ 12:18  7.38/50/<24/26/14.2  VBG Lactate: --      MICROBIOLOGY:       RADIOLOGY & ADDITIONAL TESTS:  INTERPRETATION:  XR CHEST.   INDICATION: pleural effusion  IMPRESSION: Status post sternotomy. Stent graft projecting over the left subclavian region. Large left pleural effusion and underlying atelectasis. Pulmonary edema.        ASSESSMENT AND PLAN:  69 yo M with PMhx of CAD s/p CABG (course at that time complicated by pleural effusion requiring chest tubes), ESRD on HD (MW), DMII, HTN, afib on coumadin, and anemia transferred from [facility] for evaluation of chest pain after HD and GIB s/p 2U PRBC transfusion and aflutter/afib with RVR managed with metoprolol. Course complicated by hypotension likely 2/2 to sepsis.    #Neuro  - no active issues.    #Cardiovascular  - Hypotension: MICU was consulted one day prior to transfer for hypotension responsive to Midodrine and 500cc IV bolus. Today he presented bradycardia and worsening hypotension after placement of chest tube. He was given atropine, Chris and Levophed.  - TTE on 7/7: result    - c/w Levophed wean as tolerated    - monitor HR and BP    - d/c Midodrine    #Respiratory  - Loculated Pleural effusions: Chest tube on left hemithorax draining serosanguineous fluid. Currently clamped at 5pm 7/8.   - f/u pleural fluid cx   - Pleural fluid: transudative.   -  currently saturating adequately on NC @ 4L. Wean as tolerated.      #GI/Nutrition:  - Diet: clear liquids, advance as tolerated   - C/o abdominal pain, bedside POCUS didn't show ascites. Continue to monitor.     #/Renal  - ESRD: HD on MWF. Most recent Scr 6.10 an increase from the previous labs. Potassium is stable. F/u HD tomorrow. Obtain urine cx and U/A if able to.     #Skin  - IV Line:  - HD Access: fistula in left upper arm  - erythematous and tender on right wrist. Continue to monitor. Consider XR if persists/worsen.    #ID  - S aureus PCR: (+) MRSA (-) c/w Mupirocin  - BCx: Gram + cocci in clusters. Continue Vancomycin by level.  - c/w Zosyn   - Serial blood Cx.       #Endocrine  - DMII: c/w Lispro SS. Monitor glucose.    #Hematologic/DVT ppx  - Heparin SQ q8h  - Discuss w/ GI regarding when its ok to start full A/C for afib        Attending Attestation:  Patient seen and examined with resident/fellow.  Agree with above except as noted.  70 M with CABG, ESRD on HD, known history of L pleural effusion, afib on a/c here with chest pain and concern for GIB, now with septic shock due to GPC bacteremia requiring pressors.  Unclear source, will c/w broad abx until speciation and sensitivities return  - may need further imaging of abdomen as he did have some tenderness (though this was while he was hypotensive)  - GIB - h/h stable, nothing overt, will start hsq and monitor  - L chest tube placed by CT surgery today, transudative effusion, likely chronic, will clamp chest tube.    POCUS: grossly normal LVSF, RV appears enlarged but smaller than LV  DVT ppx: hsq  GOC: full code per wife      This patient is critically ill and required frequent bedside visits with therapy change.  Total critical care time spent [id number]. This time excludes time spent on separate procedures and time spent teaching.    Moraima Monroy MD  Attending  Pulmonary/Critical Care

## 2021-07-08 NOTE — CONSULT NOTE ADULT - ASSESSMENT
70 year old with ESRD, DM, CAD presented with anemia and chest pain    Now with fever and hypotension- developed during hospitalization/ not present at admission.    Focus is not clear.        1) Anemia  S/p transfusion  Plan for egd/ colonoscopy when more stable  Check parvovirus  Check parasite smear- for babesia- but les likely in a patient from [facility].    2) NSTEMI  In setting of anemia  cardiology following      3) Left loculated effusion  S/p thoracentesis  Appears transudative    Follow up repeat CXR    Followed by pulmonary and thoracic surgery    Follow up cultures    3) Fever  Febrile overnight.   Hemodynamically unstable today- tachy- now jay  Associated hypotension    Check blood cultures time two  S/p thoracentesis- appears transudative but follow up Culture    Can check UA/ urine culture- but difficult to interpret in pt with HD    Empiric zosyn  S/p vanco times one    Plan dw NP and cardiology   70 year old with ESRD, DM, CAD presented with anemia and chest pain    Now with fever and hypotension- developed during hospitalization/ not present at admission.        1) Anemia  S/p transfusion  Plan for egd/ colonoscopy when more stable  Check parvovirus  Check parasite smear- for babesia- but les likely in a patient from [place].    2) NSTEMI  In setting of anemia  cardiology following      3) Left loculated effusion  S/p thoracentesis  Appears transudative    Follow up repeat CXR    Followed by pulmonary and thoracic surgery    Follow up cultures    3) Fever  Febrile overnight.   Hemodynamically unstable today- tachy- now jay  Associated hypotension      S/p thoracentesis- appears transudative but follow up Culture    4) Bacteremia:   Blood culture with 4/4 GPC  Await ID of GPC    Empiric zosyn  S/p vanco times one    repeat blood culture every 48 hours until negative   Check echocardiogram     Dw ICU

## 2021-07-08 NOTE — PROGRESS NOTE ADULT - SUBJECTIVE AND OBJECTIVE BOX
Nephrology Followup Note - [phone] - Dr Mendiola / Dr Knight / Dr Macias / Dr Arroyo / Dr Mackey / Dr Bowen / Dr Esposito / Dr Calle  Pt seen and examined at bedside  RRT called for hypotension and dizziness.   Last night events noted. Hypotensive, given IVF and midodrine. MICU called.   Pigtail cath placed for loculated effusion.     Allergies:  lisinopril (Other)  opioid-like analgesics (Other)    Hospital Medications:   MEDICATIONS  (STANDING):  aMIOdarone    Tablet 200 milliGRAM(s) Oral daily  aspirin  chewable 81 milliGRAM(s) Oral daily  atorvastatin 40 milliGRAM(s) Oral at bedtime  chlorhexidine 2% Cloths 1 Application(s) Topical daily  dextrose 40% Gel 15 Gram(s) Oral once  dextrose 5%. 1000 milliLiter(s) (50 mL/Hr) IV Continuous <Continuous>  dextrose 5%. 1000 milliLiter(s) (100 mL/Hr) IV Continuous <Continuous>  dextrose 50% Injectable 25 Gram(s) IV Push once  dextrose 50% Injectable 12.5 Gram(s) IV Push once  dextrose 50% Injectable 25 Gram(s) IV Push once  epoetin danilo-epbx (RETACRIT) Injectable 68794 Unit(s) IV Push <User Schedule>  glucagon  Injectable 1 milliGRAM(s) IntraMuscular once  insulin lispro (ADMELOG) corrective regimen sliding scale   SubCutaneous three times a day before meals  insulin lispro (ADMELOG) corrective regimen sliding scale   SubCutaneous at bedtime  metoprolol tartrate 25 milliGRAM(s) Oral two times a day  midodrine. 20 milliGRAM(s) Oral three times a day  mupirocin 2% Ointment 1 Application(s) Topical two times a day  pantoprazole  Injectable 40 milliGRAM(s) IV Push every 12 hours  piperacillin/tazobactam IVPB.. 3.375 Gram(s) IV Intermittent every 12 hours  sodium chloride 0.9%. 1000 milliLiter(s) (50 mL/Hr) IV Continuous <Continuous>      VITALS:  T(F): 98 (07-08-21 @ 12:00), Max: 101.9 (07-07-21 @ 17:41)  HR: 68 (07-08-21 @ 12:50)  BP: 77/45 (07-08-21 @ 12:50)  RR: 18 (07-08-21 @ 12:50)  SpO2: 96% (07-08-21 @ 12:50)  Wt(kg): --    07-07 @ 07:01  -  07-08 @ 07:00  --------------------------------------------------------  IN: 880 mL / OUT: 1900 mL / NET: -1020 mL    PHYSICAL EXAM:  Constitutional: NAD  HEENT: anicteric sclera, oropharynx clear, MMM  Neck: No JVD  Respiratory: CTAB, no wheezes, rales or rhonchi  Cardiovascular: S1, S2, RRR  Gastrointestinal: BS+, soft, NT/ND  Extremities: No cyanosis or clubbing. No peripheral edema  Neurological: A/O x 3, no focal deficits  Psychiatric: Normal mood, normal affect  : No CVA tenderness. No wagner.   Skin: No rashes    LABS:  07-08    134<L>  |  95<L>  |  15  ----------------------------<  186<H>  3.9   |  24  |  6.10<H>    Ca    8.7      08 Jul 2021 12:32  Phos  3.4     07-08  Mg     1.70     07-08    TPro  6.5  /  Alb  3.5  /  TBili  0.9  /  DBili      /  AST  13  /  ALT  7   /  AlkPhos  71  07-08    Creatinine Trend: 6.10 <--, TNP <--, 4.65 <--, 7.48 <--, 5.12 <--, 5.01 <--, 4.25 <--                        10.6   11.98 )-----------( 171      ( 08 Jul 2021 12:21 )             34.7     Urine Studies:      RADIOLOGY & ADDITIONAL STUDIES:  < from: Xray Chest 1 View- PORTABLE-Urgent (Xray Chest 1 View- PORTABLE-Urgent .) (07.07.21 @ 21:52) >  INTERPRETATION:  No emergent findings. Discussed with Effinger, PA at 2:36 AM on 7/8/2021. F/u official report.    < end of copied text >  < from: CT Chest No Cont (07.06.21 @ 20:59) >  IMPRESSION:  Moderate loculated left pleural effusion with near complete atelectasis of the left lowerlobe.    Small perihepatic ascites.    < end of copied text >

## 2021-07-08 NOTE — PROGRESS NOTE ADULT - SUBJECTIVE AND OBJECTIVE BOX
Date of Service: 07-08-21 @ 14:29    Patient is a 70y old  Male who presents with a chief complaint of Chest pain and GIB (08 Jul 2021 13:38)      Any change in ROS: febrile with some soft blood pressure::  but he is alert and awake:       MEDICATIONS  (STANDING):  aMIOdarone    Tablet 200 milliGRAM(s) Oral daily  aspirin  chewable 81 milliGRAM(s) Oral daily  atorvastatin 40 milliGRAM(s) Oral at bedtime  chlorhexidine 2% Cloths 1 Application(s) Topical daily  chlorhexidine 4% Liquid 1 Application(s) Topical <User Schedule>  dextrose 40% Gel 15 Gram(s) Oral once  dextrose 5%. 1000 milliLiter(s) (50 mL/Hr) IV Continuous <Continuous>  dextrose 5%. 1000 milliLiter(s) (100 mL/Hr) IV Continuous <Continuous>  dextrose 50% Injectable 25 Gram(s) IV Push once  dextrose 50% Injectable 12.5 Gram(s) IV Push once  dextrose 50% Injectable 25 Gram(s) IV Push once  epoetin danilo-epbx (RETACRIT) Injectable 48275 Unit(s) IV Push <User Schedule>  glucagon  Injectable 1 milliGRAM(s) IntraMuscular once  insulin lispro (ADMELOG) corrective regimen sliding scale   SubCutaneous three times a day before meals  insulin lispro (ADMELOG) corrective regimen sliding scale   SubCutaneous at bedtime  metoprolol tartrate 25 milliGRAM(s) Oral two times a day  midodrine. 20 milliGRAM(s) Oral three times a day  mupirocin 2% Ointment 1 Application(s) Topical two times a day  pantoprazole  Injectable 40 milliGRAM(s) IV Push every 12 hours  piperacillin/tazobactam IVPB.. 3.375 Gram(s) IV Intermittent every 12 hours  sodium chloride 0.9%. 1000 milliLiter(s) (50 mL/Hr) IV Continuous <Continuous>    MEDICATIONS  (PRN):  acetaminophen   Tablet .. 650 milliGRAM(s) Oral every 6 hours PRN Temp greater or equal to 38C (100.4F), Mild Pain (1 - 3)  calamine/zinc oxide Lotion 1 Application(s) Topical three times a day PRN Itching    Vital Signs Last 24 Hrs  T(C): 36.7 (08 Jul 2021 12:00), Max: 38.8 (07 Jul 2021 17:41)  T(F): 98 (08 Jul 2021 12:00), Max: 101.9 (07 Jul 2021 17:41)  HR: 68 (08 Jul 2021 12:50) (68 - 113)  BP: 77/45 (08 Jul 2021 12:50) (77/45 - 137/69)  BP(mean): --  RR: 18 (08 Jul 2021 12:50) (17 - 18)  SpO2: 96% (08 Jul 2021 12:50) (95% - 96%)    I&O's Summary    07 Jul 2021 07:01  -  08 Jul 2021 07:00  --------------------------------------------------------  IN: 880 mL / OUT: 1900 mL / NET: -1020 mL          Physical Exam:   GENERAL: obese  HEENT: KATHY/   Atraumatic, Normocephalic  ENMT: No tonsillar erythema, exudates, or enlargement; Moist mucous membranes, Good dentition, No lesions  NECK: Supple, No JVD, Normal thyroid  CHEST/LUNG: decreased air entery left side  CVS: Regular rate and rhythm; No murmurs, rubs, or gallops  GI: : Soft, Nontender, Nondistended; Bowel sounds present  NERVOUS SYSTEM:  Alert & Oriented X3  EXTREMITIES: + edema  LYMPH: No lymphadenopathy noted  SKIN: No rashes or lesions  ENDOCRINOLOGY: No Thyromegaly  PSYCH: Appropriate    Labs:  26                            10.6   11.98 )-----------( 171      ( 08 Jul 2021 12:21 )             34.7                         10.6   9.47  )-----------( 149      ( 07 Jul 2021 17:50 )             34.9                         9.6    6.93  )-----------( 140      ( 07 Jul 2021 08:23 )             30.4                         9.3    7.82  )-----------( 136      ( 06 Jul 2021 04:45 )             30.1                         9.5    6.62  )-----------( 162      ( 05 Jul 2021 09:58 )             30.7     07-08    134<L>  |  95<L>  |  15  ----------------------------<  186<H>  3.9   |  24  |  6.10<H>  07-08    TNP  |  TNP  |  TNP  ----------------------------<  TNP  TNP   |  TNP  |  TNP  07-07    137  |  96<L>  |  10  ----------------------------<  241<H>  3.8   |  23  |  4.65<H>  07-07    133<L>  |  91<L>  |  21  ----------------------------<  120<H>  3.5   |  25  |  7.48<H>  07-06    136  |  95<L>  |  13  ----------------------------<  102<H>  3.7   |  27  |  5.12<H>    Ca    8.7      08 Jul 2021 12:32  Ca    TNP      08 Jul 2021 06:58  Ca    9.0      07 Jul 2021 18:29  Ca    8.9      07 Jul 2021 08:23  Phos  3.4     07-08  Phos  TNP     07-08  Phos  3.7     07-07  Mg     1.70     07-08  Mg     TNP     07-08  Mg     1.70     07-07    TPro  6.5  /  Alb  3.5  /  TBili  0.9  /  DBili  x   /  AST  13  /  ALT  7   /  AlkPhos  71  07-08  TPro  TNP  /  Alb  TNP  /  TBili  TNP  /  DBili  x   /  AST  TNP  /  ALT  TNP  /  AlkPhos  TNP  07-08  TPro  7.3  /  Alb  4.1  /  TBili  0.9  /  DBili  x   /  AST  16  /  ALT  9   /  AlkPhos  83  07-07  TPro  6.6  /  Alb  3.6  /  TBili  1.0  /  DBili  x   /  AST  11  /  ALT  7   /  AlkPhos  73  07-07  TPro  6.2  /  Alb  3.7  /  TBili  0.8  /  DBili  x   /  AST  13  /  ALT  7   /  AlkPhos  72  07-06    CAPILLARY BLOOD GLUCOSE      POCT Blood Glucose.: 138 mg/dL (08 Jul 2021 13:56)  POCT Blood Glucose.: 169 mg/dL (08 Jul 2021 11:57)  POCT Blood Glucose.: 143 mg/dL (08 Jul 2021 09:16)  POCT Blood Glucose.: 139 mg/dL (07 Jul 2021 22:07)  POCT Blood Glucose.: 237 mg/dL (07 Jul 2021 17:12)  POCT Blood Glucose.: 103 mg/dL (07 Jul 2021 15:14)      LIVER FUNCTIONS - ( 08 Jul 2021 12:32 )  Alb: 3.5 g/dL / Pro: 6.5 g/dL / ALK PHOS: 71 U/L / ALT: 7 U/L / AST: 13 U/L / GGT: x           PT/INR - ( 08 Jul 2021 12:21 )   PT: 19.1 sec;   INR: 1.72 ratio         PTT - ( 08 Jul 2021 12:21 )  PTT:28.7 sec    Lactate, Blood: 2.4 mmol/L (07-08 @ 12:25)  Lactate, Blood: 1.1 mmol/L (07-08 @ 06:58)  Lactate, Blood: 2.7 mmol/L (07-07 @ 18:29)  Fluid Source PLEURAL  Albumin, Fluid--  Glucose, [id number] mg/dL  Protein total, Fluid3.2 g/dL  Lacatate Dehydrogenase, Fluid67 U/L  pH, Fluid--  Cytopathology-Non Gyn Report--        RECENT CULTURES:        RESPIRATORY CULTURES:    ra< from: Xray Chest 1 View- PORTABLE-Urgent (Xray Chest 1 View- PORTABLE-Urgent .) (07.07.21 @ 21:52) >    ******PRELIMINARY REPORT******    ******PRELIMINARY REPORT******            EXAM:  XR CHEST PORTABLE URGENT 1V        PROCEDURE DATE:  Jul 7 2021     ******PRELIMINARY REPORT******    ******PRELIMINARY REPORT******            INTERPRETATION:  No emergent findings. Discussed with BILLY Leija at 2:36 AM on 7/8/2021. F/u official report.          ******PRELIMINARY REPORT******    ******PRELIMINARY REPORT******          SARA HERNANDEZ MD; Resident Radiology    < end of copied text >        Studies  Chest X-RAY  CT SCAN Chest   Venous Dopplers: LE:   CT Abdomen  Others

## 2021-07-08 NOTE — PROGRESS NOTE ADULT - SUBJECTIVE AND OBJECTIVE BOX
INTERVAL HPI/OVERNIGHT EVENTS:    events noted; RRT for hypotension   (+) yellow/brown stools; no rectal bleeding or abd pain    MEDICATIONS  (STANDING):  aMIOdarone    Tablet 200 milliGRAM(s) Oral daily  aspirin  chewable 81 milliGRAM(s) Oral daily  atorvastatin 40 milliGRAM(s) Oral at bedtime  chlorhexidine 2% Cloths 1 Application(s) Topical daily  dextrose 40% Gel 15 Gram(s) Oral once  dextrose 5%. 1000 milliLiter(s) (50 mL/Hr) IV Continuous <Continuous>  dextrose 5%. 1000 milliLiter(s) (100 mL/Hr) IV Continuous <Continuous>  dextrose 50% Injectable 25 Gram(s) IV Push once  dextrose 50% Injectable 12.5 Gram(s) IV Push once  dextrose 50% Injectable 25 Gram(s) IV Push once  epoetin danilo-epbx (RETACRIT) Injectable 30396 Unit(s) IV Push <User Schedule>  glucagon  Injectable 1 milliGRAM(s) IntraMuscular once  insulin lispro (ADMELOG) corrective regimen sliding scale   SubCutaneous three times a day before meals  insulin lispro (ADMELOG) corrective regimen sliding scale   SubCutaneous at bedtime  metoprolol tartrate 25 milliGRAM(s) Oral two times a day  midodrine. 20 milliGRAM(s) Oral three times a day  mupirocin 2% Ointment 1 Application(s) Topical two times a day  ondansetron Injectable 4 milliGRAM(s) IV Push once  pantoprazole  Injectable 40 milliGRAM(s) IV Push every 12 hours  piperacillin/tazobactam IVPB.. 3.375 Gram(s) IV Intermittent every 12 hours  sodium chloride 0.9%. 1000 milliLiter(s) (50 mL/Hr) IV Continuous <Continuous>    MEDICATIONS  (PRN):  acetaminophen   Tablet .. 650 milliGRAM(s) Oral every 6 hours PRN Temp greater or equal to 38C (100.4F), Mild Pain (1 - 3)  calamine/zinc oxide Lotion 1 Application(s) Topical three times a day PRN Itching      Allergies    lisinopril (Other)  opioid-like analgesics (Other)    Intolerances        Review of Systems:    General:  No wt loss, fevers, chills, night sweats, fatigue   Eyes:  Good vision, no reported pain  ENT:  No sore throat, pain, runny nose, dysphagia  CV:  No pain, palpitations, hypo/hypertension  Resp:  No dyspnea, cough, tachypnea, wheezing  GI:  No pain, No nausea, No vomiting, No diarrhea, No constipation, No weight loss, No fever, No pruritis, No rectal bleeding, No melena, No dysphagia  :  No pain, bleeding, incontinence, nocturia  Muscle:  No pain, weakness  Neuro:  No weakness, tingling, memory problems  Psych:  No fatigue, insomnia, mood problems, depression  Endocrine:  No polyuria, polydypsia, cold/heat intolerance  Heme:  No petechiae, ecchymosis, easy bruisability  Skin:  No rash, tattoos, scars, edema      Vital Signs Last 24 Hrs  T(C): 36.5 (08 Jul 2021 08:00), Max: 38.8 (07 Jul 2021 17:41)  T(F): 97.7 (08 Jul 2021 08:00), Max: 101.9 (07 Jul 2021 17:41)  HR: 93 (08 Jul 2021 08:00) (89 - 113)  BP: 98/58 (08 Jul 2021 08:00) (80/51 - 137/69)  BP(mean): --  RR: 17 (08 Jul 2021 08:00) (17 - 18)  SpO2: 96% (08 Jul 2021 08:00) (95% - 96%)    PHYSICAL EXAM:    Constitutional: NAD  HEENT: EOMI, throat clear  Neck: No LAD, supple  Respiratory: CTA and P  Cardiovascular: S1 and S2, RRR, no M  Gastrointestinal: BS+, soft, NT/ND, neg HSM,  Extremities: No peripheral edema, neg clubbing, cyanosis  Vascular: 2+ peripheral pulses  Neurological: A/O x 2, no focal deficits  Psychiatric: Normal mood, normal affect  Skin: No rashes      LABS:                        10.6   9.47  )-----------( 149      ( 07 Jul 2021 17:50 )             34.9     07-08    TNP  |  TNP  |  TNP  ----------------------------<  TNP  TNP   |  TNP  |  TNP    Ca    TNP      08 Jul 2021 06:58  Phos  TNP     07-08  Mg     TNP     07-08    TPro  TNP  /  Alb  TNP  /  TBili  TNP  /  DBili  x   /  AST  TNP  /  ALT  TNP  /  AlkPhos  TNP  07-08    PT/INR - ( 07 Jul 2021 10:28 )   PT: 18.4 sec;   INR: 1.64 ratio         PTT - ( 07 Jul 2021 17:50 )  PTT:65.5 sec      RADIOLOGY & ADDITIONAL TESTS:

## 2021-07-08 NOTE — RAPID RESPONSE TEAM SUMMARY - NSSITUATIONBACKGROUNDRRT_GEN_ALL_CORE
71 yo M with PMhx of CAD s/p CABG (course at that time complicated by pleural effusion requiring chest tubes), ESRD on HD (MWF), DMII, HTN, afib on coumadin, and anemia presented from [facility] for evaluation of chest pain and GIB. MICU was consulted yesterday due to hypotension to 80/51. Chest tubes were placed for loculated pleural effusion and Midodrine was given. At that time the patient became bradycardic and hypotensive. RRT was called due to multifactorial shock possibly 2/2 sepsis iso recent fluid removal from CT and possibly medication induced bradycardia. Pt persistently hypotensive and bradycardic during rapid. Given 1.25L of fluid. Bedside POCUS with b/l lung sliding. CXR without evidence of primary pulmonary cause of shock. MICU team and cardiology at bedside. Patient then given atropine 0.5mg x 2 with no response. Given push of phenylephrine and started on norepinephrine with improvement of blood pressures. Patient transferred to MICU. Primary team aware. Family aware.

## 2021-07-08 NOTE — PROGRESS NOTE ADULT - ASSESSMENT
69 yo M with PMhx of CAD s/p CABG (course at that time complicated by pleural effusion requiring chest tubes), ESRD on HD (MWF), DMII, HTN, afib on coumadin, and anemia presented from [facility] for evaluation of chest pain and GIB. Renal following for ESRD Mx.    ESRD  Maintenance schedule MWF  Pt tolerated HD yesterday, without acute event events. FLowsheet reviewed. BP stable during treatment.   No objection to IVF, given severe symptomatic hypotension.  MICU consulted. May need pressor support.    No indication for HD today. Will reassess HD need tomorrow, and if hemodynamically stable for HD.   Renal diet  dose all meds for ESRD  HTN, currently hypotensive, in setting of fever and rigors last night.   Agree with infectious w/u. On abx, f/u BCx. s/p L plueral pigtail catheter.     Anemia in CKD+GIB- Hb <goal  c/w Epo 20k tiw with HD  monitor h/h, neg FOBT  GI following, No plans for cscope.     Renal osteodystrophy  Phos at goal    Rudy Mendiola MD  [facility]  Office [phone]  Ans Serv [phone]  Cell - [phone]

## 2021-07-08 NOTE — PROGRESS NOTE ADULT - ASSESSMENT
69 yo M with PMhx of CAD s/p CABG (course at that time complicated by pleural effusion requiring chest tubes), ESRD on HD (MWF), DMII, HTN, afib on coumadin, and anemia presented from [facility] for evaluation of chest pain and GIB.       left sided pleural effusion: loculated and chronic:   CAD:  CABG:  ESRD:   DM:  HTN:  A fibrillation:   GI Bleed    7/7:    left sided pleural effusion: loculated and chronic: he has chr loculated pleural effusion on left side: He had chest tube placement in last June 2020 at another hospital: no chemistry is available but he had negative cytology at that time: This time the effusion seems slightly worse and has loculated effusion with pleural thickening: heis not SOB andhasbeen on roomair: I think , it at all, if this effusion needs to be dealt with : it is probably vats: will contact thoracic surgery : etiology not very clear as there is no previous chemistry: coult be exudative or transudatve: he is on HD and now it is a chr christiano effusion   CAD: cont per cards  CABG: cont current meds  ESRD: on HD   DM: monitor and control  HTN: controlled  A fibrillation: off ac:   GI Bleed: per gi :  DW pt and t [email or web address]    7/8:    left sided pleural effusion:: fever: s/p left sided chest tube placement: cultures sent: however with pr criteria it seems transudative await serum LDH but pleural fluid LDH is low:  already started on broad spectrum antibiotics ID to see: await cultures:   CAD: cont per cards  CABG: cont current meds  ESRD: on HD   DM: monitor and control  HTN: controlled  A fibrillation: off ac:   GI Bleed: per gi :  DW pmd

## 2021-07-08 NOTE — PHYSICAL THERAPY INITIAL EVALUATION ADULT - PERTINENT HX OF CURRENT PROBLEM, REHAB EVAL
Pt. admitted for chest pain and GIB. Per documentation, pt. found to have loculated left pleural effusion on CT chest.

## 2021-07-08 NOTE — CONSULT NOTE ADULT - SUBJECTIVE AND OBJECTIVE BOX
HISTORY OF PRESENT ILLNESS: HPI:  69 yo M with PMhx of CAD s/p CABG (course at that time complicated by pleural effusion requiring chest tubes), ESRD on HD (MWF), DMII, HTN, afib on coumadin, and anemia presented from [facility] for evaluation of chest pain and GIB. Patient is AAOX2 and was able to provide some information. Most of the information was obtained from charting. Patient was admitted to [facility] after developing chest pain during HD. He was found to have Hgb of 6 and elevated trops concerning for GIB and NSTEM II. He was give 2 units of pRBCs and monitored off warfarin/aspirin. He underwent bowel prep but it did not show any bloody stool. As a result, no colonoscopy was performed. His course was complicated by aflutter/afib with RVR. He was managed with metoprolol and never required cardioversion. On day of transfer, his Hgb was in the 8s. Patient was deemed stable enough to be restarted on aspirin and warfarin. Patient was transferred to [facility] for further evaluation. Currently, patient have no symptoms but reports to have intermittent chest pain, that is suprasternal, non-radiating, exertional at times, and non-reproducible on palpation. it improves with rest and worsens with movement. He denies any fever, chills, cough, orthopnea, PND, LE edema, abdominal pain, diarrhea, or dysuria.    On the floor, vitals are WNL.  (03 Jul 2021 21:11)    7/8- seen in hospital after MICU xfer.  Had a rapid response today for hypotension, after a new pleural tube drained close to a liter of fluid.    He has been started on NorEpi and is feeling better.  No angina or palpitations- states "I feel OK".  Per family, he minimizes medical complaints and only comes into hospital when severely ill.  Has been in atrial flutter since late 2019.    PAST MEDICAL & SURGICAL HISTORY:  HTN (Hypertension)    DM (Diabetes Mellitus)  x 4 yrs without N/N/R    CAD (Coronary Artery Disease)    Hypercholesterolemia    Coronary Stent  CABG in 2019    Heart Attack  2/1/07 with subsequent stent    H/O: CVA  after cardiac stent placed 12/15/09 -no residual    Hyperthyroidism    Stented coronary artery  total 15 stents from [id number]    ESRD (end stage renal disease) on dialysis  MWF    [facility]  2006 and 2008    S/P cataract extraction    Hemodialysis access, AV graft  5/2015, L arm    S/P CABG x 4    MEDICATIONS  (STANDING):  aMIOdarone    Tablet 200 milliGRAM(s) Oral daily  aspirin  chewable 81 milliGRAM(s) Oral daily  atorvastatin 40 milliGRAM(s) Oral at bedtime  chlorhexidine 2% Cloths 1 Application(s) Topical daily  chlorhexidine 4% Liquid 1 Application(s) Topical <User Schedule>  dextrose 40% Gel 15 Gram(s) Oral once  dextrose 5%. 1000 milliLiter(s) (50 mL/Hr) IV Continuous <Continuous>  dextrose 5%. 1000 milliLiter(s) (100 mL/Hr) IV Continuous <Continuous>  dextrose 50% Injectable 25 Gram(s) IV Push once  dextrose 50% Injectable 12.5 Gram(s) IV Push once  dextrose 50% Injectable 25 Gram(s) IV Push once  epoetin danilo-epbx (RETACRIT) Injectable 52750 Unit(s) IV Push <User Schedule>  glucagon  Injectable 1 milliGRAM(s) IntraMuscular once  heparin   Injectable 5000 Unit(s) SubCutaneous every 8 hours  insulin lispro (ADMELOG) corrective regimen sliding scale   SubCutaneous three times a day before meals  insulin lispro (ADMELOG) corrective regimen sliding scale   SubCutaneous at bedtime  metoprolol tartrate 25 milliGRAM(s) Oral two times a day  mupirocin 2% Ointment 1 Application(s) Topical two times a day  norepinephrine Infusion 0.25 MICROgram(s)/kG/Min (40.4 mL/Hr) IV Continuous <Continuous>  pantoprazole  Injectable 40 milliGRAM(s) IV Push every 12 hours  piperacillin/tazobactam IVPB.. 3.375 Gram(s) IV Intermittent every 12 hours  sodium chloride 0.9%. 1000 milliLiter(s) (50 mL/Hr) IV Continuous <Continuous>      Allergies    lisinopril (Other)  opioid-like analgesics (Other)    Intolerances    FAMILY HISTORY:  Family history of diabetes mellitus (Sibling)    Family history of coronary artery disease    Non-contributary for premature coronary disease or sudden cardiac death    SOCIAL HISTORY:    [ x] Non-smoker  [ ] Smoker  [ ] Alcohol      PHYSICAL EXAM:  T(C): 37.8 (07-08-21 @ 16:00), Max: 38.3 (07-07-21 @ 20:30)  HR: 89 (07-08-21 @ 18:00) (68 - 104)  BP: 102/79 (07-08-21 @ 18:00) (77/45 - 136/55)  RR: 17 (07-08-21 @ 18:00) (16 - 24)  SpO2: 100% (07-08-21 @ 18:00) (95% - 100%)  Wt(kg): --    Appearance: small frame male with poor muscle mass.  no acute distress.  HEENT:   Normal oral mucosa, PERRL, EOMI	  Lymphatic: No lymphadenopathy , no edema  Cardiovascular: Normal S1 S2, No JVD, No murmurs , Peripheral pulses palpable 2+ bilaterally  Respiratory: Lungs clear to auscultation, normal effort 	  Gastrointestinal:  Soft, Non-tender, + BS	  Skin: No rashes, No ecchymoses, No cyanosis, warm to touch  Musculoskeletal: Normal range of motion, normal strength  Psychiatry:  Mood & affect appropriate    TELEMETRY: atrial flutter	    ECG: typical atrial flutter, RBBB. 	  Echo: Normal LV systolic function.  Mild RV failure  CT Chest w/ left pleural effusion, RV and RA enlargement, diffuse coronary calcifications.	  LABS:	 	                          10.6   11.98 )-----------( 171      ( 08 Jul 2021 12:21 )             34.7     07-08    134<L>  |  95<L>  |  15  ----------------------------<  186<H>  3.9   |  24  |  6.10<H>    Ca    8.7      08 Jul 2021 12:32  Phos  3.4     07-08  Mg     1.70     07-08    TPro  6.5  /  Alb  3.5  /  TBili  0.9  /  DBili  x   /  AST  13  /  ALT  7   /  AlkPhos  71  07-08    ASSESSMENT/PLAN: 	70y Male with abnormal EKG.  Atrial Flutter w/ RBBB.  Was given Atropine during rapid response, with resulting bizarre changes in QRS axis.    Cannot call His-Purkinje dysfunction in the setting of atropine dosing and irregular heart rhythm of AFlutter with variable conduction.  The outcome of that maneuver is nondiagnostic, and he does not require permanent pacing.    Anticoagulation for AFib/AFlutter to prevent stroke.  Warfarin to goal INR 2-3, or Heparin infusion.  Alternatively, could be sent home later on with Apixaban 2.5mg BID.  Recommend stopping amiodarone- this medication will only lead to multi-organ toxicity, and he has been in atrial flutter for at least 2 yrs.  When he is well, could consider EP Study and ablation for atrial flutter (provided this is typical RA flutter), and further rate control of any remaining atrial fibrillation.    Will follow.    Pierce Nguyen M.D.  Cardiac Electrophysiology    office [phone]  pager [phone]

## 2021-07-08 NOTE — PROGRESS NOTE ADULT - SUBJECTIVE AND OBJECTIVE BOX
S: N/V, doesn't feel well, +SOB, febrile overnight     Review of systems otherwise (-)    Review of Systems:   Constitutional: [ ] fevers, [ ] chills.   Skin: [ ] dry skin. [ ] rashes.  Psychiatric: [ ] depression, [ ] anxiety.   Gastrointestinal: [ ] BRBPR, [ ] melena.   Neurological: [ ] confusion. [ ] seizures. [ ] shuffling gait.   Ears,Nose,Mouth and Throat: [ ] ear pain [ ] sore throat.   Eyes: [ ] diplopia.   Respiratory: [ ] hemoptysis. [ ] shortness of breath  Cardiovascular: See HPI above  Hematologic/Lymphatic: [ ] anemia. [ ] painful nodes. [ ] prolonged bleeding.   Genitourinary: [ ] hematuria. [ ] flank pain.   Endocrine: [ ] significant change in weight. [ ] intolerance to heat and cold.     Review of systems [x ] otherwise negative, [ ] otherwise unable to obtain    FH: no family history of sudden cardiac death in first degree relatives    SH: [ ] tobacco, [ ] alcohol, [ ] drugs    acetaminophen   Tablet .. 650 milliGRAM(s) Oral every 6 hours PRN  aMIOdarone    Tablet 200 milliGRAM(s) Oral daily  aspirin  chewable 81 milliGRAM(s) Oral daily  atorvastatin 40 milliGRAM(s) Oral at bedtime  calamine/zinc oxide Lotion 1 Application(s) Topical three times a day PRN  chlorhexidine 2% Cloths 1 Application(s) Topical daily  dextrose 40% Gel 15 Gram(s) Oral once  dextrose 5%. 1000 milliLiter(s) IV Continuous <Continuous>  dextrose 5%. 1000 milliLiter(s) IV Continuous <Continuous>  dextrose 50% Injectable 25 Gram(s) IV Push once  dextrose 50% Injectable 12.5 Gram(s) IV Push once  dextrose 50% Injectable 25 Gram(s) IV Push once  epoetin danilo-epbx (RETACRIT) Injectable 77932 Unit(s) IV Push <User Schedule>  glucagon  Injectable 1 milliGRAM(s) IntraMuscular once  insulin lispro (ADMELOG) corrective regimen sliding scale   SubCutaneous three times a day before meals  insulin lispro (ADMELOG) corrective regimen sliding scale   SubCutaneous at bedtime  metoprolol tartrate 25 milliGRAM(s) Oral two times a day  midodrine. 20 milliGRAM(s) Oral three times a day  mupirocin 2% Ointment 1 Application(s) Topical two times a day  pantoprazole  Injectable 40 milliGRAM(s) IV Push every 12 hours  piperacillin/tazobactam IVPB.. 3.375 Gram(s) IV Intermittent every 12 hours  sodium chloride 0.9%. 1000 milliLiter(s) IV Continuous <Continuous>                        10.6   11.98 )-----------( 171      ( 08 Jul 2021 12:21 )             34.7       134<L>  |  95<L>  |  15  ----------------------------<  186<H>  3.9   |  24  |  6.10<H>    Ca    8.7      08 Jul 2021 12:32  Phos  3.4     07-08  Mg     1.70     07-08    TPro  6.5  /  Alb  3.5  /  TBili  0.9  /  DBili  x   /  AST  13  /  ALT  7   /  AlkPhos  71  07-08      T(C): 36.7 (07-08-21 @ 12:00), Max: 38.8 (07-07-21 @ 17:41)  HR: 68 (07-08-21 @ 12:50) (68 - 113)  BP: 77/45 (07-08-21 @ 12:50) (77/45 - 137/69)  RR: 18 (07-08-21 @ 12:50) (17 - 18)  SpO2: 96% (07-08-21 @ 12:50) (95% - 96%)    General: appears uncomfortable   Head: Normocephalic and atraumatic.   Neck: No JVD. No bruits. Supple. Does not appear to be enlarged.   Cardiovascular: + S1,S2 ; RRR Soft systolic murmur at the left lower sternal border. No rubs noted.    Lungs: decreased BS BL   Abdomen: + BS, soft. Non tender. Non distended. No rebound. No guarding.   Extremities: No clubbing/cyanosis/edema.   Neurologic: Moves all four extremities. Full range of motion.   Skin: Warm and moist. The patient's skin has normal elasticity and good skin turgor.   Psychiatric: Appropriate mood and affect.  Musculoskeletal: Normal range of motion, normal strength    TELEMETRY: AF 90s	      ECG: AFL, RBBB  	    < from: Xray Chest 1 View- PORTABLE-Routine (Xray Chest 1 View- PORTABLE-Routine .) (07.04.21 @ 10:13) >  FINDINGS/  IMPRESSION:    Status post sternotomy. Stent graft projectingover the left subclavian region    Large left pleural effusion and underlying atelectasis. Pulmonary edema.    < end of copied text >    < from: CT Chest No Cont (07.06.21 @ 20:59) >  IMPRESSION:  Moderate loculated left pleural effusion with near complete atelectasis of the left lowerlobe.    Small perihepatic ascites.    < end of copied text >      < from: Transthoracic Echocardiogram (07.07.21 @ 18:17) >  CONCLUSIONS:  1. Mitral annular calcification, otherwise normal mitral  valve. Minimal mitral regurgitation.  2. Endocardium not well visualized; grossly normal left  ventricular systolic function.  3. Right ventricular enlargement with decreased right  ventricular systolic function.  4. Inadequate tricuspid regurgitation Doppler envelope  precludes estimation of RVSP.  ------------------------------------------------------------------------  Confirmed on  7/7/2021 - 21:16:20 by Osvaldo Bertrand M.D.,  [facility], [facility]    < end of copied text >      ASSESSMENT/PLAN: Patient is a 69 y/o male well known to our office with PMH of CAD s/p multiple stents, s/p CABG (9/2019 course c/b Afib and LT pleural effusion), HTN, HLD, DM2, Afib on coumadin, ESRD on HD, and hyperthyroidism who presented to [facility] for chest pain and poss GIB, severe anemia Hgb of 6 requiring PRBCs found to have elevated troponin. Patient transferred to [facility] for further evaluation. Cardiology consulted for further evaluation.    - No active chest pain currently  - Monitor H/H  - Elevated troponin noted in setting of anemia and renal disease with negative CK/CKMB - do not suspect ACS  - COVID negative  - Continue medical management of CAD with ASA/Statin if okay with GI  - Continue Amio and metoprolol with hold parameters  - AC with coumadin on hold until cleared by GI  - GI eval with Dr. Roe appreciated  - Fluid removal with HD per renal  --TTE with grossly normal LV function and decreased RV function (last echo with RVE and RV dysfxn, mod pulm HTN 6/2020)  - CT chest noted, appreciate pulm and thoracic eval, s/p bedside CT placement with reported 700ml pleural fluid drained initially  - Pt with fevers and hypotension, cultures sent, MICU eval, IVF bolus given.  sepstic shock, source unclear?

## 2021-07-08 NOTE — PHYSICAL THERAPY INITIAL EVALUATION ADULT - ADDITIONAL COMMENTS
Pt. was left seated in chair post PT Evaluation, no apparent distress, all lines intact, PCA present. BP 95/56, pt. denied lightheadedness/dizziness.

## 2021-07-08 NOTE — CONSULT NOTE ADULT - SUBJECTIVE AND OBJECTIVE BOX
Patient is a 70y old  Male who presents with a chief complaint of Chest pain and GIB (08 Jul 2021 13:38)    HPI:  71 yo M with PMhx of "  ·	CAD s/p CABG (course at that time complicated by pleural effusion requiring chest tubes)  ·	ESRD on HD (MWF)  ·	DMII  ·	HTN  ·	afib on coumadin  ·	anemia       He presented from [facility] for evaluation of chest pain and GIB on 6/4.     Patient is AAOX2 and was able to provide some information. Most of the information was obtained from charting. Patient was admitted to [facility] after developing chest pain during HD. He was found to have Hgb of 6 and elevated trops concerning for GIB and NSTEM II. He was give 2 units of pRBCs and monitored off warfarin/aspirin. He underwent bowel prep but it did not show any bloody stool. As a result, no colonoscopy was performed. His course was complicated by aflutter/afib with RVR. He was managed with metoprolol and never required cardioversion. On day of transfer, his Hgb was in the 8s. Patient was deemed stable enough to be restarted on aspirin and warfarin. Patient was transferred to [facility] for further evaluation.     Hypotensive with fever/rigors overnight.    S/p thoracentesis this am.    Now with hypotension/ increased lethargy      PAST MEDICAL & SURGICAL HISTORY:  HTN (Hypertension)  DM (Diabetes Mellitus)  CAD (Coronary Artery Disease)- s/p MI and CABG  Hypercholesterolemia  CVA  Hyperthyroidism  ESRD (end stage renal disease) on dialysis via left AV fistula  Skin boils  S/P cataract extraction    Left sided pleural effusion 2020    Allergies  lisinopril (Other)  opioid-like analgesics (Other)        ANTIMICROBIALS:    piperacillin/tazobactam IVPB.. 3.375 every 12 hours    OTHER MEDS: MEDICATIONS  (STANDING):  acetaminophen   Tablet .. 650 every 6 hours PRN  aMIOdarone    Tablet 200 daily  aspirin  chewable 81 daily  atorvastatin 40 at bedtime  dextrose 40% Gel 15 once  dextrose 50% Injectable 25 once  dextrose 50% Injectable 12.5 once  dextrose 50% Injectable 25 once  epoetin danilo-epbx (RETACRIT) Injectable 20000 <User Schedule>  glucagon  Injectable 1 once  insulin lispro (ADMELOG) corrective regimen sliding scale  three times a day before meals  insulin lispro (ADMELOG) corrective regimen sliding scale  at bedtime  metoprolol tartrate 25 two times a day  midodrine. 20 three times a day  pantoprazole  Injectable 40 every 12 hours    SOCIAL HISTORY:   hx smoking    no travel      FAMILY HISTORY:  Family history of diabetes mellitus (Sibling)    Family history of coronary artery disease      REVIEW OF SYSTEMS  [  ] ROS unobtainable because:    [x  ] All other systems negative except as noted below:	    Constitutional:  [x ] fever [ ] chills  [ ] weight loss  [x ] weakness  Skin:  [ ] rash [ ] phlebitis	  Eyes: [ ] icterus [ ] pain  [ ] discharge	  ENMT: [ ] sore throat  [ ] thrush [ ] ulcers [ ] exudates  Respiratory: [ ] dyspnea [ ] hemoptysis [ ] cough [ ] sputum	  Cardiovascular:  [ ] chest pain [ ] palpitations [ ] edema	  Gastrointestinal:  [ ] nausea [ ] vomiting [ ] diarrhea [ ] constipation [ ] pain	  Genitourinary:  [ ] dysuria [ ] frequency [ ] hematuria [ ] discharge [ ] flank pain  [ ] incontinence  Musculoskeletal:  [ ] myalgias [ ] arthralgias [ ] arthritis  [ ] back pain  Neurological:  [ ] headache [ ] seizures  [ ] confusion/altered mental status  Psychiatric:  [ ] anxiety [ ] depression	  Hematology/Lymphatics:  [ ] lymphadenopathy  Endocrine:  [ ] adrenal [ ] thyroid  Allergic/Immunologic:	 [ ] transplant [ ] seasonal    Vital Signs Last 24 Hrs  T(F): 98 (07-08-21 @ 12:00), Max: 101.9 (07-07-21 @ 17:41)  Vital Signs Last 24 Hrs  HR: 68 (07-08-21 @ 12:50) (68 - 113)  BP: 77/45 (07-08-21 @ 12:50) (77/45 - 137/69)  RR: 18 (07-08-21 @ 12:50)  SpO2: 96% (07-08-21 @ 12:50) (95% - 96%)  Wt(kg): --    EXAM:  Constitutional: lethargic  Eyes: pupils bilaterally reactive to light. No icterus.  Oral cavity: Clear, no lesions  Neck: No neck vein distension noted  RS: Chest clear to auscultation bilaterally. No wheeze/rhonchi/crepitations.  CVS: S1, S2 heard. Regular rate and rhythm. No murmurs/rubs/gallops.  Abdomen: Soft. No guarding/rigidity/tenderness.  : No acute abnormalities  Extremities: Warm. No pedal edema  Skin: No lesions noted  Vascular: No evidence of phlebitis; left AV fistula in place  Neuro: Alert, oriented to time/place/person                          10.6   11.98 )-----------( 171      ( 08 Jul 2021 12:21 )             34.7     07-08    134<L>  |  95<L>  |  15  ----------------------------<  186<H>  3.9   |  24  |  6.10<H>    Ca    8.7      08 Jul 2021 12:32  Phos  3.4     07-08  Mg     1.70     07-08    TPro  6.5  /  Alb  3.5  /  TBili  0.9  /  DBili  x   /  AST  13  /  ALT  7   /  AlkPhos  71  07-08    MICROBIOLOGY:    Blood cultures : testing  Pleural fluid culture: testing    < from: CT Chest No Cont (07.06.21 @ 20:59) >  IMPRESSION:  Moderate loculated left pleural effusion with near complete atelectasis of the left lowerlobe.    Small perihepatic ascites.    < end of copied text >

## 2021-07-08 NOTE — PROGRESS NOTE ADULT - ATTENDING COMMENTS
Patient seen and examined.  Agree with above.   Pt. with fevers, rising lactate, and hypotension concerning for septic shock   Recommend MICU evaluation and transfer  S/p pigtail placement - monitor fluid output  Pressors per MICU   Abx per ID  AC on hold due to prior anemia   H/H has remained stable  Pt. with episodes of bradycardiac on tele - ECG with aflutter and PVC's - EP eval with Dr. Nguyen called     Kalie Lau MD

## 2021-07-08 NOTE — PROGRESS NOTE ADULT - SUBJECTIVE AND OBJECTIVE BOX
Patient is a 70y old  Male who presents with a chief complaint of Chest pain and GIB (08 Jul 2021 14:29)    PATIENT IS SEEN AND EXAMINED IN MEDICAL FLOOR.  NGT [    ]    DOSS [   ]      GT [   ]    ALLERGIES:  lisinopril (Other)  opioid-like analgesics (Other)      Daily     Daily     VITALS:    Vital Signs Last 24 Hrs  T(C): 36.7 (08 Jul 2021 12:00), Max: 38.8 (07 Jul 2021 17:41)  T(F): 98 (08 Jul 2021 12:00), Max: 101.9 (07 Jul 2021 17:41)  HR: 94 (08 Jul 2021 17:00) (68 - 113)  BP: 120/72 (08 Jul 2021 17:00) (77/45 - 137/69)  BP(mean): 80 (08 Jul 2021 17:00) (58 - 80)  RR: 24 (08 Jul 2021 17:00) (16 - 24)  SpO2: 100% (08 Jul 2021 17:00) (95% - 100%)    LABS:    CBC Full  -  ( 08 Jul 2021 12:21 )  WBC Count : 11.98 K/uL  RBC Count : 3.70 M/uL  Hemoglobin : 10.6 g/dL  Hematocrit : 34.7 %  Platelet Count - Automated : 171 K/uL  Mean Cell Volume : 93.8 fL  Mean Cell Hemoglobin : 28.6 pg  Mean Cell Hemoglobin Concentration : 30.5 gm/dL  Auto Neutrophil # : x  Auto Lymphocyte # : x  Auto Monocyte # : x  Auto Eosinophil # : x  Auto Basophil # : x  Auto Neutrophil % : x  Auto Lymphocyte % : x  Auto Monocyte % : x  Auto Eosinophil % : x  Auto Basophil % : x    PT/INR - ( 08 Jul 2021 12:21 )   PT: 19.1 sec;   INR: 1.72 ratio         PTT - ( 08 Jul 2021 12:21 )  PTT:28.7 sec  07-08    134<L>  |  95<L>  |  15  ----------------------------<  186<H>  3.9   |  24  |  6.10<H>    Ca    8.7      08 Jul 2021 12:32  Phos  3.4     07-08  Mg     1.70     07-08    TPro  6.5  /  Alb  3.5  /  TBili  0.9  /  DBili  x   /  AST  13  /  ALT  7   /  AlkPhos  71  07-08    CAPILLARY BLOOD GLUCOSE      POCT Blood Glucose.: 138 mg/dL (08 Jul 2021 13:56)  POCT Blood Glucose.: 169 mg/dL (08 Jul 2021 11:57)  POCT Blood Glucose.: 143 mg/dL (08 Jul 2021 09:16)  POCT Blood Glucose.: 139 mg/dL (07 Jul 2021 22:07)    CARDIAC MARKERS ( 08 Jul 2021 12:32 )  x     / x     / 49 U/L / x     / x          LIVER FUNCTIONS - ( 08 Jul 2021 12:32 )  Alb: 3.5 g/dL / Pro: 6.5 g/dL / ALK PHOS: 71 U/L / ALT: 7 U/L / AST: 13 U/L / GGT: x           Creatinine Trend: 6.10<--, TNP<--, 4.65<--, 7.48<--, 5.12<--, 5.01<--  I&O's Summary    07 Jul 2021 07:01  -  08 Jul 2021 07:00  --------------------------------------------------------  IN: 880 mL / OUT: 1900 mL / NET: -1020 mL            .Blood Blood-Venous  07-07 @ 23:06   Growth in aerobic bottle: Gram Positive Cocci in Clusters  Growth in anaerobic bottle: Gram Positive Cocci in Clusters  ***Blood Panel PCR results on this specimen are available  approximately 3 hours after the Gram stain result.***  Gram stain, PCR, and/or culture results may not always  correspond due to difference in methodologies.  ************************************************************  This PCR assay was performed by multiplex PCR. This  Assay tests for 66 bacterial and resistance gene targets.  Please refer to the [facility] test directory  at [email or web address] for details.  --    Growth in aerobic bottle: Gram Positive Cocci in Clusters  Growth in anaerobic bottle: Gram Positive Cocci in Clusters      .Blood Blood  07-07 @ 23:05   Growth in aerobic bottle: Gram Positive Cocci in Clusters  --    Growth in aerobic bottle: Gram Positive Cocci in Clusters          MEDICATIONS:    MEDICATIONS  (STANDING):  aMIOdarone    Tablet 200 milliGRAM(s) Oral daily  aspirin  chewable 81 milliGRAM(s) Oral daily  atorvastatin 40 milliGRAM(s) Oral at bedtime  chlorhexidine 2% Cloths 1 Application(s) Topical daily  chlorhexidine 4% Liquid 1 Application(s) Topical <User Schedule>  dextrose 40% Gel 15 Gram(s) Oral once  dextrose 5%. 1000 milliLiter(s) (50 mL/Hr) IV Continuous <Continuous>  dextrose 5%. 1000 milliLiter(s) (100 mL/Hr) IV Continuous <Continuous>  dextrose 50% Injectable 25 Gram(s) IV Push once  dextrose 50% Injectable 12.5 Gram(s) IV Push once  dextrose 50% Injectable 25 Gram(s) IV Push once  epoetin danilo-epbx (RETACRIT) Injectable 77627 Unit(s) IV Push <User Schedule>  glucagon  Injectable 1 milliGRAM(s) IntraMuscular once  heparin   Injectable 5000 Unit(s) SubCutaneous every 8 hours  insulin lispro (ADMELOG) corrective regimen sliding scale   SubCutaneous three times a day before meals  insulin lispro (ADMELOG) corrective regimen sliding scale   SubCutaneous at bedtime  metoprolol tartrate 25 milliGRAM(s) Oral two times a day  mupirocin 2% Ointment 1 Application(s) Topical two times a day  pantoprazole  Injectable 40 milliGRAM(s) IV Push every 12 hours  piperacillin/tazobactam IVPB.. 3.375 Gram(s) IV Intermittent every 12 hours  sodium chloride 0.9%. 1000 milliLiter(s) (50 mL/Hr) IV Continuous <Continuous>      MEDICATIONS  (PRN):  acetaminophen   Tablet .. 1000 milliGRAM(s) Oral every 6 hours PRN Temp greater or equal to 38C (100.4F), Mild Pain (1 - 3), Moderate Pain (4 - 6)  calamine/zinc oxide Lotion 1 Application(s) Topical three times a day PRN Itching      REVIEW OF SYSTEMS:                           ALL ROS DONE [ X   ]    CONSTITUTIONAL:  LETHARGIC [   ], FEVER [   ], UNRESPONSIVE [   ]  CVS:  CP  [   ], SOB, [   ], PALPITATIONS [   ], DIZZYNESS [   ]  RS: COUGH [   ], SPUTUM [   ]  GI: ABDOMINAL PAIN [   ], NAUSEA [   ], VOMITINGS [   ], DIARRHEA [   ], CONSTIPATION [   ]  :  DYSURIA [   ], NOCTURIA [   ], INCREASED FREQUENCY [   ], DRIBLING [   ],  SKELETAL: PAINFUL JOINTS [   ], SWOLLEN JOINTS [   ], NECK ACHE [   ], LOW BACK ACHE [   ],  SKIN : ULCERS [   ], RASH [   ], ITCHING [   ]  CNS: HEAD ACHE [   ], DOUBLE VISION [   ], BLURRED VISION [   ], AMS / CONFUSION [   ], SEIZURES [   ], WEAKNESS [   ],TINGLING / NUMBNESS [   ]    PHYSICAL EXAMINATION:  GENERAL APPEARANCE: NO DISTRESS  HEENT:  NO PALLOR, NO  JVD,  NO   NODES, NECK SUPPLE  CVS: S1 +, S2 +,   RS: AEEB,  OCCASIONAL  RALES +,   NO RONCHI  ABD: SOFT, NT, NO, BS +  EXT: NO PE  SKIN: WARM,   SKELETAL:  ROM ACCEPTABLE  CNS:  AAO X    ,   DEFICITS    RADIOLOGY :      ASSESSMENT :     Heart disease    HTN (Hypertension)    DM (Diabetes Mellitus)    CAD (Coronary Artery Disease)    Hypercholesterolemia    Coronary Stent    Heart Attack    H/O: CVA    CKD (chronic kidney disease)    Hyperthyroidism    Stented coronary artery    ESRD (end stage renal disease) on dialysis    Boil of Buttock    S/P cataract extraction    Hemodialysis access, AV graft    S/P CABG x 4        PLAN:  HPI:  71 yo M with PMhx of CAD s/p CABG (course at that time complicated by pleural effusion requiring chest tubes), ESRD on HD (MWF), DMII, HTN, afib on coumadin, and anemia presented from [facility] for evaluation of chest pain and GIB. Patient is AAOX2 and was able to provide some information. Most of the information was obtained from charting. Patient was admitted to [facility] after developing chest pain during HD. He was found to have Hgb of 6 and elevated trops concerning for GIB and NSTEM II. He was give 2 units of pRBCs and monitored off warfarin/aspirin. He underwent bowel prep but it did not show any bloody stool. As a result, no colonoscopy was performed. His course was complicated by aflutter/afib with RVR. He was managed with metoprolol and never required cardioversion. On day of transfer, his Hgb was in the 8s. Patient was deemed stable enough to be restarted on aspirin and warfarin. Patient was transferred to [facility] for further evaluation. Currently, patient have no symptoms but reports to have intermittent chest pain, that is suprasternal, non-radiating, exertional at times, and non-reproducible on palpation. it improves with rest and worsens with movement. He denies any fever, chills, cough, orthopnea, PND, LE edema, abdominal pain, diarrhea, or dysuria.    On the floor, vitals are WNL.  (03 Jul 2021 21:11)    -      Patient is a 70y old  Male who presents with a chief complaint of Chest pain and GIB (08 Jul 2021 14:29)    PATIENT IS SEEN AND EXAMINED IN MEDICAL FLOOR.    ALLERGIES:  lisinopril (Other)  opioid-like analgesics (Other)      Daily     Daily     VITALS:    Vital Signs Last 24 Hrs  T(C): 36.7 (08 Jul 2021 12:00), Max: 38.8 (07 Jul 2021 17:41)  T(F): 98 (08 Jul 2021 12:00), Max: 101.9 (07 Jul 2021 17:41)  HR: 94 (08 Jul 2021 17:00) (68 - 113)  BP: 120/72 (08 Jul 2021 17:00) (77/45 - 137/69)  BP(mean): 80 (08 Jul 2021 17:00) (58 - 80)  RR: 24 (08 Jul 2021 17:00) (16 - 24)  SpO2: 100% (08 Jul 2021 17:00) (95% - 100%)    LABS:    CBC Full  -  ( 08 Jul 2021 12:21 )  WBC Count : 11.98 K/uL  RBC Count : 3.70 M/uL  Hemoglobin : 10.6 g/dL  Hematocrit : 34.7 %  Platelet Count - Automated : 171 K/uL  Mean Cell Volume : 93.8 fL  Mean Cell Hemoglobin : 28.6 pg  Mean Cell Hemoglobin Concentration : 30.5 gm/dL  Auto Neutrophil # : x  Auto Lymphocyte # : x  Auto Monocyte # : x  Auto Eosinophil # : x  Auto Basophil # : x  Auto Neutrophil % : x  Auto Lymphocyte % : x  Auto Monocyte % : x  Auto Eosinophil % : x  Auto Basophil % : x    PT/INR - ( 08 Jul 2021 12:21 )   PT: 19.1 sec;   INR: 1.72 ratio         PTT - ( 08 Jul 2021 12:21 )  PTT:28.7 sec  07-08    134<L>  |  95<L>  |  15  ----------------------------<  186<H>  3.9   |  24  |  6.10<H>    Ca    8.7      08 Jul 2021 12:32  Phos  3.4     07-08  Mg     1.70     07-08    TPro  6.5  /  Alb  3.5  /  TBili  0.9  /  DBili  x   /  AST  13  /  ALT  7   /  AlkPhos  71  07-08    CAPILLARY BLOOD GLUCOSE      POCT Blood Glucose.: 138 mg/dL (08 Jul 2021 13:56)  POCT Blood Glucose.: 169 mg/dL (08 Jul 2021 11:57)  POCT Blood Glucose.: 143 mg/dL (08 Jul 2021 09:16)  POCT Blood Glucose.: 139 mg/dL (07 Jul 2021 22:07)    CARDIAC MARKERS ( 08 Jul 2021 12:32 )  x     / x     / 49 U/L / x     / x          LIVER FUNCTIONS - ( 08 Jul 2021 12:32 )  Alb: 3.5 g/dL / Pro: 6.5 g/dL / ALK PHOS: 71 U/L / ALT: 7 U/L / AST: 13 U/L / GGT: x           Creatinine Trend: 6.10<--, TNP<--, 4.65<--, 7.48<--, 5.12<--, 5.01<--  I&O's Summary    07 Jul 2021 07:01  -  08 Jul 2021 07:00  --------------------------------------------------------  IN: 880 mL / OUT: 1900 mL / NET: -1020 mL            .Blood Blood-Venous  07-07 @ 23:06   Growth in aerobic bottle: Gram Positive Cocci in Clusters  Growth in anaerobic bottle: Gram Positive Cocci in Clusters  ***Blood Panel PCR results on this specimen are available  approximately 3 hours after the Gram stain result.***  Gram stain, PCR, and/or culture results may not always  correspond due to difference in methodologies.  ************************************************************  This PCR assay was performed by multiplex PCR. This  Assay tests for 66 bacterial and resistance gene targets.  Please refer to the [facility] test directory  at [email or web address] for details.  --    Growth in aerobic bottle: Gram Positive Cocci in Clusters  Growth in anaerobic bottle: Gram Positive Cocci in Clusters      .Blood Blood  07-07 @ 23:05   Growth in aerobic bottle: Gram Positive Cocci in Clusters  --    Growth in aerobic bottle: Gram Positive Cocci in Clusters          MEDICATIONS:    MEDICATIONS  (STANDING):  aMIOdarone    Tablet 200 milliGRAM(s) Oral daily  aspirin  chewable 81 milliGRAM(s) Oral daily  atorvastatin 40 milliGRAM(s) Oral at bedtime  chlorhexidine 2% Cloths 1 Application(s) Topical daily  chlorhexidine 4% Liquid 1 Application(s) Topical <User Schedule>  dextrose 40% Gel 15 Gram(s) Oral once  dextrose 5%. 1000 milliLiter(s) (50 mL/Hr) IV Continuous <Continuous>  dextrose 5%. 1000 milliLiter(s) (100 mL/Hr) IV Continuous <Continuous>  dextrose 50% Injectable 25 Gram(s) IV Push once  dextrose 50% Injectable 12.5 Gram(s) IV Push once  dextrose 50% Injectable 25 Gram(s) IV Push once  epoetin danilo-epbx (RETACRIT) Injectable 51989 Unit(s) IV Push <User Schedule>  glucagon  Injectable 1 milliGRAM(s) IntraMuscular once  heparin   Injectable 5000 Unit(s) SubCutaneous every 8 hours  insulin lispro (ADMELOG) corrective regimen sliding scale   SubCutaneous three times a day before meals  insulin lispro (ADMELOG) corrective regimen sliding scale   SubCutaneous at bedtime  metoprolol tartrate 25 milliGRAM(s) Oral two times a day  mupirocin 2% Ointment 1 Application(s) Topical two times a day  pantoprazole  Injectable 40 milliGRAM(s) IV Push every 12 hours  piperacillin/tazobactam IVPB.. 3.375 Gram(s) IV Intermittent every 12 hours  sodium chloride 0.9%. 1000 milliLiter(s) (50 mL/Hr) IV Continuous <Continuous>      MEDICATIONS  (PRN):  acetaminophen   Tablet .. 1000 milliGRAM(s) Oral every 6 hours PRN Temp greater or equal to 38C (100.4F), Mild Pain (1 - 3), Moderate Pain (4 - 6)  calamine/zinc oxide Lotion 1 Application(s) Topical three times a day PRN Itching      REVIEW OF SYSTEMS:                           ALL ROS DONE [ X   ]    CONSTITUTIONAL:  LETHARGIC [   ], FEVER [   ], UNRESPONSIVE [   ]  CVS:  CP  [   ], SOB, [   ], PALPITATIONS [   ], DIZZYNESS [   ]  RS: COUGH [   ], SPUTUM [   ]  GI: ABDOMINAL PAIN [   ], NAUSEA [   ], VOMITINGS [   ], DIARRHEA [   ], CONSTIPATION [   ]  :  DYSURIA [   ], NOCTURIA [   ], INCREASED FREQUENCY [   ], DRIBLING [   ],  SKELETAL: PAINFUL JOINTS [   ], SWOLLEN JOINTS [   ], NECK ACHE [   ], LOW BACK ACHE [   ],  SKIN : ULCERS [   ], RASH [   ], ITCHING [   ]  CNS: HEAD ACHE [   ], DOUBLE VISION [   ], BLURRED VISION [   ], AMS / CONFUSION [   ], SEIZURES [   ], WEAKNESS [   ],TINGLING / NUMBNESS [   ]      PHYSICAL EXAMINATION:  GENERAL APPEARANCE: NO DISTRESS  HEENT:  NO PALLOR, NO  JVD,  NO   NODES, NECK SUPPLE  CVS: S1 +, S2 +,   RS: AEEB,  OCCASIONAL  RALES +,    B/L OCCASIONAL RHONCHI+  ABD: SOFT, NT, NO, BS +  EXT: NO PE  SKIN: WARM,    LUE AVF +  SKELETAL:  ROM ACCEPTABLE  CNS:  AAO X  2 , NO   DEFICITS    RADIOLOGY :    EXAM:  CT CHEST        PROCEDURE DATE:  Jul 6 2021         INTERPRETATION:  CLINICAL INFORMATION: Shortness of breath, evaluate pleural effusion    COMPARISON: CT chest 7/6/2020, chest x-ray 7/4/2021.    CONTRAST/COMPLICATIONS:  None    FINDINGS:    Hypoattenuating nodules in the right thyroid gland with calcification. The chest wall is normal. There is no mediastinal, hilar, or axillary lymphadenopathy.    The heart size is enlarged. No pericardial effusion. Coronary artery calcification. Status post CABG. Aortic valve calcification.    Calcification of the aorta and its branches. Left subclavian vein stent.    Moderate loculated left pleural effusion with near complete atelectasis of the left lower lobe. Subsegmental atelectasis in the right lower lobe.    Below the diaphragm, small perihepatic ascites is noted.    Degenerative changes of the spine. Status post median sternotomy.    IMPRESSION:  Moderate loculated left pleural effusion with near complete atelectasis of the left lowerlobe.    Small perihepatic ascites.      ASSESSMENT :       PLAN:  HPI:  69 yo M with PMhx of CAD s/p CABG (course at that time complicated by pleural effusion requiring chest tubes), ESRD on HD (MWF), DMII, HTN, afib on coumadin, and anemia presented from [facility] for evaluation of chest pain and GIB. Patient is AAOX2 and was able to provide some information. Most of the information was obtained from charting. Patient was admitted to [facility] after developing chest pain during HD. He was found to have Hgb of 6 and elevated trops concerning for GIB and NSTEM II. He was give 2 units of pRBCs and monitored off warfarin/aspirin. He underwent bowel prep but it did not show any bloody stool. As a result, no colonoscopy was performed. His course was complicated by aflutter/afib with RVR. He was managed with metoprolol and never required cardioversion. On day of transfer, his Hgb was in the 8s. Patient was deemed stable enough to be restarted on aspirin and warfarin. Patient was transferred to [facility] for further evaluation. Currently, patient have no symptoms but reports to have intermittent chest pain, that is suprasternal, non-radiating, exertional at times, and non-reproducible on palpation. it improves with rest and worsens with movement. He denies any fever, chills, cough, orthopnea, PND, LE edema, abdominal pain, diarrhea, or dysuria.    On the floor, vitals are WNL.  (03 Jul 2021 21:11)    # SEPTIC SHOCK S/T [facility] BACTEREMIA - ON VANCOMYCIN, F/U BCX, F/U LACTIC ACID, S/P OVERNIGHT MICU EVAL W/ RECOMMENDATION FOR MIDODRINE, HOWEVER GIVEN PATIENT'S EPSIODE OF UNRESPONSIVE STATE AND PERSISTENT HYPOTENSION + BACTEREMIA - TRANSFERRED TO MICU FOR FURTHER EVALUTION AND TREATMENT  # ELEVATED TROPONIN, CHEST PAIN - TRENDED TROPONINS, ON TELEMETRY, CARDIOLOGY CONSULT IN PROGRESS  - TTE - MINIMAL MR, NORMAL LV SYSTOLIC FUNCTION, DECREASED RV SYSTOLIC FUNCTION W/ RVE  # SUSPECTED GI BLEED, NORMOCYTIC ANEMIA - ? AOCKD, FOBT NEGATIVE, F/U ANEMIA PANEL  - GI CONSULT IN PROGRESS  # PLEURAL EFFUSION - CT CHEST W/ MODERATE LOCULATED LEFT PLEURAL EFFUSION - PULM CONSULT IN PROGRESS, CT SURGERY CONSULT IN PROGRESS  - MAY NEED PLEURAL BIOPSY, EFFUSION DRAINAGE AND DECORTICATION  - THORACENTESIS + CT PLACEMENT WAS BEING PERFORMED AT BESIDE DURING ENCOUNTER THIS A.M.  - F/U PLEURAL FLUID ANALYSIS  # AFIB ON COUMADIN  # CAD S/P CABG  # ESRD ON HD M,W,F  # T2DM  # HTN  # GI PPX

## 2021-07-08 NOTE — PROGRESS NOTE ADULT - ASSESSMENT
70 year old male with possible GI bleed       GI bleed  negative occult without overt gi bleeding (yellow/brown stoolls)  Monitor CBC and Keep hemoglobin > 8 given cardiac issues   PPI BID   Avoid NSAIDS; No GI objection to ASA   no urgent endoscopic evaluation as H/H stable and negative occult    CAD  workup per cardiology appreciated       Advanced care planning was discussed with patient.  Advanced care planning forms were reviewed and discussed.  Risks, benefits and alternatives of gastroenterologic procedures were discussed in detail and all questions were answered.  30 minutes spent.  70 year old male with possible GI bleed       GI bleed  negative occult without overt gi bleeding (yellow/brown stoolls)  Monitor CBC and Keep hemoglobin > 8 given cardiac issues   PPI BID w/Maalox PRN    Avoid NSAIDS; No GI objection to ASA   Zofran PRN for Nausea  no urgent endoscopic evaluation as H/H stable and negative occult    CAD  workup per cardiology appreciated       Advanced care planning was discussed with patient.  Advanced care planning forms were reviewed and discussed.  Risks, benefits and alternatives of gastroenterologic procedures were discussed in detail and all questions were answered.  30 minutes spent.  70 year old male with possible GI bleed       GI bleed  negative occult without overt gi bleeding (yellow/brown stoolls)  Monitor CBC and Keep hemoglobin > 8 given cardiac issues   PPI BID w/Maalox PRN    Avoid NSAIDS; No GI objection to ASA   Zofran PRN for Nausea  no urgent endoscopic evaluation as H/H stable and negative occult  possible inpt scope when fully optimized     CAD  workup per cardiology appreciated       Advanced care planning was discussed with patient.  Advanced care planning forms were reviewed and discussed.  Risks, benefits and alternatives of gastroenterologic procedures were discussed in detail and all questions were answered.  30 minutes spent.

## 2021-07-08 NOTE — CHART NOTE - NSCHARTNOTEFT_GEN_A_CORE
Called by RN this afternoon for patient with event of unresponsiveness noted by patients wife at bedside.  Patient noted to be hypotensive SBP 70s.   IVF bolus started, midodrine previously given. Patient responsive at this time.   IVF being infused, persistently hypotensive.   MICU called for evaluation.   Medical RRT called, team to bedside. Dr. Colin at bedside during rapid response and aware of events.   Patient accepted to ICU and transferred.  Patients wife at bedside and aware of events and plan.

## 2021-07-09 LAB
ALBUMIN SERPL ELPH-MCNC: 3.2 G/DL — LOW (ref 3.3–5)
ALP SERPL-CCNC: 66 U/L — SIGNIFICANT CHANGE UP (ref 40–120)
ALT FLD-CCNC: 10 U/L — SIGNIFICANT CHANGE UP (ref 4–41)
ANION GAP SERPL CALC-SCNC: 22 MMOL/L — HIGH (ref 7–14)
ANISOCYTOSIS BLD QL: SLIGHT — SIGNIFICANT CHANGE UP
AST SERPL-CCNC: 31 U/L — SIGNIFICANT CHANGE UP (ref 4–40)
BASOPHILS # BLD AUTO: 0.17 K/UL — SIGNIFICANT CHANGE UP (ref 0–0.2)
BASOPHILS NFR BLD AUTO: 0.9 % — SIGNIFICANT CHANGE UP (ref 0–2)
BILIRUB SERPL-MCNC: 0.9 MG/DL — SIGNIFICANT CHANGE UP (ref 0.2–1.2)
BLD GP AB SCN SERPL QL: NEGATIVE — SIGNIFICANT CHANGE UP
BUN SERPL-MCNC: 18 MG/DL — SIGNIFICANT CHANGE UP (ref 7–23)
CALCIUM SERPL-MCNC: 8.3 MG/DL — LOW (ref 8.4–10.5)
CHLORIDE SERPL-SCNC: 87 MMOL/L — LOW (ref 98–107)
CK MB BLD-MCNC: 16.8 % — HIGH (ref 0–2.5)
CK MB CFR SERPL CALC: 37.2 NG/ML — HIGH
CK MB CFR SERPL CALC: 44.8 NG/ML — HIGH
CK SERPL-CCNC: 221 U/L — HIGH (ref 30–200)
CO2 SERPL-SCNC: 17 MMOL/L — LOW (ref 22–31)
COMMENT - FLUIDS: SIGNIFICANT CHANGE UP
CREAT SERPL-MCNC: 6.42 MG/DL — HIGH (ref 0.5–1.3)
DACRYOCYTES BLD QL SMEAR: SLIGHT — SIGNIFICANT CHANGE UP
EOSINOPHIL # BLD AUTO: 0 K/UL — SIGNIFICANT CHANGE UP (ref 0–0.5)
EOSINOPHIL NFR BLD AUTO: 0 % — SIGNIFICANT CHANGE UP (ref 0–6)
GIANT PLATELETS BLD QL SMEAR: PRESENT — SIGNIFICANT CHANGE UP
GLUCOSE BLDC GLUCOMTR-MCNC: 153 MG/DL — HIGH (ref 70–99)
GLUCOSE BLDC GLUCOMTR-MCNC: 173 MG/DL — HIGH (ref 70–99)
GLUCOSE BLDC GLUCOMTR-MCNC: 187 MG/DL — HIGH (ref 70–99)
GLUCOSE BLDC GLUCOMTR-MCNC: 202 MG/DL — HIGH (ref 70–99)
GLUCOSE SERPL-MCNC: 295 MG/DL — HIGH (ref 70–99)
GRAM STN FLD: SIGNIFICANT CHANGE UP
HCT VFR BLD CALC: 31.3 % — LOW (ref 39–50)
HGB BLD-MCNC: 9.4 G/DL — LOW (ref 13–17)
IANC: 14.77 K/UL — HIGH (ref 1.5–8.5)
LYMPHOCYTES # BLD AUTO: 0.66 K/UL — LOW (ref 1–3.3)
LYMPHOCYTES # BLD AUTO: 3.5 % — LOW (ref 13–44)
MACROCYTES BLD QL: SLIGHT — SIGNIFICANT CHANGE UP
MAGNESIUM SERPL-MCNC: 2 MG/DL — SIGNIFICANT CHANGE UP (ref 1.6–2.6)
MANUAL SMEAR VERIFICATION: SIGNIFICANT CHANGE UP
MCHC RBC-ENTMCNC: 28.3 PG — SIGNIFICANT CHANGE UP (ref 27–34)
MCHC RBC-ENTMCNC: 30 GM/DL — LOW (ref 32–36)
MCV RBC AUTO: 94.3 FL — SIGNIFICANT CHANGE UP (ref 80–100)
MONOCYTES # BLD AUTO: 1.3 K/UL — HIGH (ref 0–0.9)
MONOCYTES NFR BLD AUTO: 6.9 % — SIGNIFICANT CHANGE UP (ref 2–14)
NEUTROPHILS # BLD AUTO: 15.56 K/UL — HIGH (ref 1.8–7.4)
NEUTROPHILS NFR BLD AUTO: 80 % — HIGH (ref 43–77)
NEUTS BAND # BLD: 2.6 % — SIGNIFICANT CHANGE UP (ref 0–6)
NRBC # BLD: 3 /100 — HIGH (ref 0–0)
OVALOCYTES BLD QL SMEAR: SLIGHT — SIGNIFICANT CHANGE UP
PHOSPHATE SERPL-MCNC: 4.8 MG/DL — HIGH (ref 2.5–4.5)
PLAT MORPH BLD: NORMAL — SIGNIFICANT CHANGE UP
PLATELET # BLD AUTO: 183 K/UL — SIGNIFICANT CHANGE UP (ref 150–400)
PLATELET COUNT - ESTIMATE: NORMAL — SIGNIFICANT CHANGE UP
POIKILOCYTOSIS BLD QL AUTO: SLIGHT — SIGNIFICANT CHANGE UP
POLYCHROMASIA BLD QL SMEAR: SIGNIFICANT CHANGE UP
POTASSIUM SERPL-MCNC: 4 MMOL/L — SIGNIFICANT CHANGE UP (ref 3.5–5.3)
POTASSIUM SERPL-SCNC: 4 MMOL/L — SIGNIFICANT CHANGE UP (ref 3.5–5.3)
PROT SERPL-MCNC: 6 G/DL — SIGNIFICANT CHANGE UP (ref 6–8.3)
RBC # BLD: 3.32 M/UL — LOW (ref 4.2–5.8)
RBC # FLD: 17.2 % — HIGH (ref 10.3–14.5)
RBC BLD AUTO: ABNORMAL
RH IG SCN BLD-IMP: POSITIVE — SIGNIFICANT CHANGE UP
SODIUM SERPL-SCNC: 126 MMOL/L — LOW (ref 135–145)
TROPONIN T, HIGH SENSITIVITY RESULT: 1115 NG/L — CRITICAL HIGH
TROPONIN T, HIGH SENSITIVITY RESULT: 1327 NG/L — CRITICAL HIGH
TROPONIN T, HIGH SENSITIVITY RESULT: 613 NG/L — CRITICAL HIGH
VANCOMYCIN FLD-MCNC: 10.4 UG/ML — SIGNIFICANT CHANGE UP
VARIANT LYMPHS # BLD: 6.1 % — HIGH (ref 0–6)
WBC # BLD: 18.84 K/UL — HIGH (ref 3.8–10.5)
WBC # FLD AUTO: 18.84 K/UL — HIGH (ref 3.8–10.5)

## 2021-07-09 PROCEDURE — 71045 X-RAY EXAM CHEST 1 VIEW: CPT | Mod: 26

## 2021-07-09 PROCEDURE — 99291 CRITICAL CARE FIRST HOUR: CPT

## 2021-07-09 PROCEDURE — 99233 SBSQ HOSP IP/OBS HIGH 50: CPT

## 2021-07-09 PROCEDURE — 76604 US EXAM CHEST: CPT | Mod: 26,GC

## 2021-07-09 RX ORDER — VANCOMYCIN HCL 1 G
1250 VIAL (EA) INTRAVENOUS ONCE
Refills: 0 | Status: COMPLETED | OUTPATIENT
Start: 2021-07-09 | End: 2021-07-09

## 2021-07-09 RX ORDER — ACETAMINOPHEN 500 MG
1000 TABLET ORAL ONCE
Refills: 0 | Status: COMPLETED | OUTPATIENT
Start: 2021-07-09 | End: 2021-07-09

## 2021-07-09 RX ORDER — CEFAZOLIN SODIUM 1 G
1000 VIAL (EA) INJECTION EVERY 24 HOURS
Refills: 0 | Status: DISCONTINUED | OUTPATIENT
Start: 2021-07-09 | End: 2021-07-12

## 2021-07-09 RX ADMIN — PANTOPRAZOLE SODIUM 40 MILLIGRAM(S): 20 TABLET, DELAYED RELEASE ORAL at 17:13

## 2021-07-09 RX ADMIN — Medication 166.67 MILLIGRAM(S): at 06:39

## 2021-07-09 RX ADMIN — AMIODARONE HYDROCHLORIDE 200 MILLIGRAM(S): 400 TABLET ORAL at 05:39

## 2021-07-09 RX ADMIN — Medication 1000 MILLIGRAM(S): at 02:02

## 2021-07-09 RX ADMIN — Medication 81 MILLIGRAM(S): at 12:21

## 2021-07-09 RX ADMIN — ERYTHROPOIETIN 20000 UNIT(S): 10000 INJECTION, SOLUTION INTRAVENOUS; SUBCUTANEOUS at 19:35

## 2021-07-09 RX ADMIN — CHLORHEXIDINE GLUCONATE 1 APPLICATION(S): 213 SOLUTION TOPICAL at 12:20

## 2021-07-09 RX ADMIN — HEPARIN SODIUM 5000 UNIT(S): 5000 INJECTION INTRAVENOUS; SUBCUTANEOUS at 15:23

## 2021-07-09 RX ADMIN — PANTOPRAZOLE SODIUM 40 MILLIGRAM(S): 20 TABLET, DELAYED RELEASE ORAL at 05:39

## 2021-07-09 RX ADMIN — Medication 100 MILLIGRAM(S): at 21:51

## 2021-07-09 RX ADMIN — Medication 1: at 17:13

## 2021-07-09 RX ADMIN — HEPARIN SODIUM 5000 UNIT(S): 5000 INJECTION INTRAVENOUS; SUBCUTANEOUS at 21:50

## 2021-07-09 RX ADMIN — MUPIROCIN 1 APPLICATION(S): 20 OINTMENT TOPICAL at 05:40

## 2021-07-09 RX ADMIN — HEPARIN SODIUM 5000 UNIT(S): 5000 INJECTION INTRAVENOUS; SUBCUTANEOUS at 05:39

## 2021-07-09 RX ADMIN — MUPIROCIN 1 APPLICATION(S): 20 OINTMENT TOPICAL at 17:13

## 2021-07-09 RX ADMIN — Medication 1: at 08:30

## 2021-07-09 RX ADMIN — Medication 400 MILLIGRAM(S): at 00:15

## 2021-07-09 RX ADMIN — Medication 2: at 12:19

## 2021-07-09 RX ADMIN — PIPERACILLIN AND TAZOBACTAM 25 GRAM(S): 4; .5 INJECTION, POWDER, LYOPHILIZED, FOR SOLUTION INTRAVENOUS at 02:25

## 2021-07-09 RX ADMIN — ATORVASTATIN CALCIUM 40 MILLIGRAM(S): 80 TABLET, FILM COATED ORAL at 21:51

## 2021-07-09 NOTE — DIETITIAN INITIAL EVALUATION ADULT. - NUTRITION DIAGNOSTIC #1 OTHER
Decreased Nutrient Needs; Sodium, Fat/Cholesterol, Potassium, Phosphorus, Fluid.  Modification of carbohydrate intake.

## 2021-07-09 NOTE — PROGRESS NOTE ADULT - ASSESSMENT
71 yo M with PMhx of CAD s/p CABG (course at that time complicated by pleural effusion requiring chest tubes), ESRD on HD (MWF), DMII, HTN, afib on coumadin, and anemia transferred from [facility] for evaluation of chest pain after HD and GIB s/p 2U PRBC transfusion and aflutter/afib with RVR managed with metoprolol. Admitted to MICU for hypotension dependent on pressors likely 2/2 to sepsis.    #Neuro  - no active issues.    #Cardiovascular  - Hypotension: MICU was consulted one day prior to transfer for hypotension responsive to Midodrine and 500cc IV bolus. Today he presented bradycardia and worsening hypotension after placement of chest tube. He was given atropine, Chris and Levophed.  - TTE on 7/7: Right ventricular enlargement with decreased right ventricular systolic function. Inadequate tricuspid regurgitation Doppler envelope precludes estimation of RVSP.    - c/w Levophed wean as tolerated    - monitor HR and BP    - d/c Midodrine    #Respiratory  - Loculated Pleural effusions: Chest tube on left hemithorax draining serosanguineous fluid. Currently clamped at 5pm 7/8.   - pleural fluid cx: (-)   - Pleural fluid: transudative.   - currently saturating adequately on NC @ 4L. Wean as tolerated.      #GI/Nutrition:  - Diet: clear liquids, advance as tolerated   - C/o abdominal pain, bedside POCUS didn't show ascites. Continue to monitor.     #/Renal  - ESRD: HD on MWF. Most recent Scr 6.10 an increase from the previous labs. Potassium is stable. Obtain urine cx and U/A if able to.   - HD Today. Net fluid out (-)    #Skin  - IV Line:   - HD Access: fistula in left upper arm  - erythematous and tender on right wrist. Continue to monitor. Consider XR if persists/worsen.    #ID  - S aureus PCR: (+) MRSA (-) c/w Mupirocin  - BCx: Gram + cocci in clusters. Continue Vancomycin by level.  - c/w Zosyn   - Serial blood Cx.       #Endocrine  - DMII: c/w Lispro SS. Monitor glucose.    #Hematologic/DVT ppx  - Heparin SQ q8h  - Discuss w/ GI regarding when its ok to start full A/C for afib        Attending Attestation:  Patient seen and examined with resident/fellow.  Agree with above except as noted.  70 M with CABG, ESRD on HD, known history of L pleural effusion, afib on a/c here with chest pain and concern for GIB, now with septic shock due to GPC bacteremia requiring pressors.  Unclear source, will c/w broad abx until speciation and sensitivities return  - may need further imaging of abdomen as he did have some tenderness (though this was while he was hypotensive)  - GIB - h/h stable, nothing overt, will start hsq and monitor  - L chest tube placed by CT surgery today, transudative effusion, likely chronic, will clamp chest tube.    POCUS: grossly normal LVSF, RV appears enlarged but smaller than LV  DVT ppx: hsq  GOC: full code per wife 71 yo M with PMhx of CAD s/p CABG (course at that time complicated by pleural effusion requiring chest tubes), ESRD on HD (MWF), DMII, HTN, afib on coumadin, and anemia transferred from [facility] for evaluation of chest pain after HD and GIB s/p 2U PRBC transfusion and aflutter/afib with RVR managed with metoprolol. Admitted to MICU for hypotension dependent on pressors likely 2/2 to sepsis.    #Neuro  - no active issues.    #Cardiovascular  - Hypotension: MICU was consulted one day prior to transfer for hypotension responsive to Midodrine and 500cc IV bolus. Today he presented bradycardia and worsening hypotension after placement of chest tube. He was given atropine, Chris and Levophed.  - TTE on 7/7: Right ventricular enlargement with decreased right ventricular systolic function. Inadequate tricuspid regurgitation Doppler envelope precludes estimation of RVSP.    - c/w Levophed wean as tolerated    - monitor HR and BP    - d/c Midodrine    - Elevated troponin likely 2/2 to hypotensive event yesterday. Will reorder and trend. Pt currently asx w/ adequate vital signs.    #Respiratory  - Loculated Pleural effusions: Chest tube on left hemithorax draining serosanguineous fluid. Currently clamped at 5pm 7/8.   - pleural fluid cx: (-)   - Pleural fluid: transudative.   - currently saturating adequately on NC @ 4L. Wean as tolerated.  - f/u w/ CT surgery for possible chest tube removal as effusion is transudative and cultures were neg.       #GI/Nutrition:  - Diet: clear liquids, advance as tolerated   - C/o abdominal pain, bedside POCUS didn't show ascites. Continue to monitor.       #/Renal  - ESRD: HD on MWF. Most recent Scr 6.10 an increase from the previous labs. Potassium is stable. Obtain urine cx and U/A if able to.   - HD Today. Net fluid out (-)      #Skin  - IV Line: new lines today as pt took lines off during the night.  - HD Access: fistula in left upper arm  - erythematous and tender on right wrist. Continue to monitor. Consider XR if persists/worsen. Will get wrist U/S.      #ID  - S aureus PCR: (+) MRSA (-) c/w Mupirocin  - BCx: Gram + cocci in clusters. Continue Vancomycin by level.  - d/c Zosyn as pt w/ MSSA. Start Cefazolin 1g QD after HD.   - Serial blood Cx q48h until neg.      #Endocrine  - DMII: c/w Lispro SS. Monitor glucose.      #Hematologic/DVT ppx  - Heparin SQ q8h  - Discuss w/ GI regarding when its ok to start full A/C for afib      #Ethics  - full code as per wife

## 2021-07-09 NOTE — PROGRESS NOTE ADULT - SUBJECTIVE AND OBJECTIVE BOX
CARDIOLOGY ATTENDING    tele - atrial flutter    awake, on low dose pressors, appears comfortable, much improved from yesterday    he denies palpitations nor syncope    acetaminophen   Tablet .. 1000 milliGRAM(s) Oral every 6 hours PRN  aspirin  chewable 81 milliGRAM(s) Oral daily  atorvastatin 40 milliGRAM(s) Oral at bedtime  calamine/zinc oxide Lotion 1 Application(s) Topical three times a day PRN  ceFAZolin   IVPB 1000 milliGRAM(s) IV Intermittent every 24 hours  chlorhexidine 2% Cloths 1 Application(s) Topical daily  dextrose 40% Gel 15 Gram(s) Oral once  dextrose 5%. 1000 milliLiter(s) IV Continuous <Continuous>  dextrose 5%. 1000 milliLiter(s) IV Continuous <Continuous>  dextrose 50% Injectable 25 Gram(s) IV Push once  dextrose 50% Injectable 12.5 Gram(s) IV Push once  dextrose 50% Injectable 25 Gram(s) IV Push once  epoetin danilo-epbx (RETACRIT) Injectable 45848 Unit(s) IV Push <User Schedule>  glucagon  Injectable 1 milliGRAM(s) IntraMuscular once  heparin   Injectable 5000 Unit(s) SubCutaneous every 8 hours  insulin lispro (ADMELOG) corrective regimen sliding scale   SubCutaneous three times a day before meals  insulin lispro (ADMELOG) corrective regimen sliding scale   SubCutaneous at bedtime  metoprolol tartrate 25 milliGRAM(s) Oral two times a day  mupirocin 2% Ointment 1 Application(s) Topical two times a day  norepinephrine Infusion 0.25 MICROgram(s)/kG/Min IV Continuous <Continuous>  pantoprazole  Injectable 40 milliGRAM(s) IV Push every 12 hours  sodium chloride 0.9%. 1000 milliLiter(s) IV Continuous <Continuous>                            9.4    18.84 )-----------( 183      ( 09 Jul 2021 04:17 )             31.3       07-09    126<L>  |  87<L>  |  18  ----------------------------<  295<H>  4.0   |  17<L>  |  6.42<H>    Ca    8.3<L>      09 Jul 2021 04:17  Phos  4.8     07-09  Mg     2.00     07-09    TPro  6.0  /  Alb  3.2<L>  /  TBili  0.9  /  DBili  x   /  AST  31  /  ALT  10  /  AlkPhos  66  07-09      CARDIAC MARKERS ( 09 Jul 2021 13:04 )  x     / x     / x     / x     / 44.8 ng/mL  CARDIAC MARKERS ( 08 Jul 2021 12:32 )  x     / x     / 49 U/L / x     / x            T(C): 37.3 (07-09-21 @ 16:00), Max: 38 (07-09-21 @ 00:00)  HR: 60 (07-09-21 @ 16:05) (55 - 95)  BP: 133/80 (07-09-21 @ 16:05) (45/32 - 152/121)  RR: 22 (07-09-21 @ 16:05) (14 - 31)  SpO2: 98% (07-09-21 @ 16:05) (92% - 100%)  Wt(kg): --    I&O's Summary    08 Jul 2021 07:01  -  09 Jul 2021 07:00  --------------------------------------------------------  IN: 1097 mL / OUT: 0 mL / NET: 1097 mL    no JVD  IRR, no murmurs  CTAB  soft nt/nd  no c/c/e      A/P) 69 y/o male PMH CAD s/p CABG and old PCI, AFL x 1 year on coumadin, HTN, hyperlipidemia, DM, ESRD on HD, and hypothyroidism admitted with septic shock    -continue metoprolol for stable CAD with normal LVEF  -management of atrial flutter as per EP   -supportive are as per MICU  -no further inpatient cardiac workup expected

## 2021-07-09 NOTE — PROGRESS NOTE ADULT - SUBJECTIVE AND OBJECTIVE BOX
EP ATTENDING    tele: typical AFL with PVCs, HR 60s, no evidence of heart block    he denies palpitations, syncope, nor angina, ROS otherwise -      Review of Systems:   Constitutional: [ ] fevers, [ ] chills.   Skin: [ ] dry skin. [ ] rashes.  Psychiatric: [ ] depression, [ ] anxiety.   Gastrointestinal: [ ] BRBPR, [ ] melena.   Neurological: [ ] confusion. [ ] seizures. [ ] shuffling gait.   Ears,Nose,Mouth and Throat: [ ] ear pain [ ] sore throat.   Eyes: [ ] diplopia.   Respiratory: [ ] hemoptysis. [ ] shortness of breath  Cardiovascular: See HPI above  Hematologic/Lymphatic: [ ] anemia. [ ] painful nodes. [ ] prolonged bleeding.   Genitourinary: [ ] hematuria. [ ] flank pain.   Endocrine: [ ] significant change in weight. [ ] intolerance to heat and cold.     Review of systems [ x] otherwise negative, [ ] otherwise unable to obtain    FH: no family history of sudden cardiac death in first degree relatives    SH: [ ] tobacco, [ ] alcohol, [ ] drugs    acetaminophen   Tablet .. 1000 milliGRAM(s) Oral every 6 hours PRN  aMIOdarone    Tablet 200 milliGRAM(s) Oral daily  aspirin  chewable 81 milliGRAM(s) Oral daily  atorvastatin 40 milliGRAM(s) Oral at bedtime  calamine/zinc oxide Lotion 1 Application(s) Topical three times a day PRN  ceFAZolin   IVPB 1000 milliGRAM(s) IV Intermittent every 24 hours  chlorhexidine 2% Cloths 1 Application(s) Topical daily  dextrose 40% Gel 15 Gram(s) Oral once  dextrose 5%. 1000 milliLiter(s) IV Continuous <Continuous>  dextrose 5%. 1000 milliLiter(s) IV Continuous <Continuous>  dextrose 50% Injectable 25 Gram(s) IV Push once  dextrose 50% Injectable 12.5 Gram(s) IV Push once  dextrose 50% Injectable 25 Gram(s) IV Push once  epoetin danilo-epbx (RETACRIT) Injectable 77998 Unit(s) IV Push <User Schedule>  glucagon  Injectable 1 milliGRAM(s) IntraMuscular once  heparin   Injectable 5000 Unit(s) SubCutaneous every 8 hours  insulin lispro (ADMELOG) corrective regimen sliding scale   SubCutaneous three times a day before meals  insulin lispro (ADMELOG) corrective regimen sliding scale   SubCutaneous at bedtime  metoprolol tartrate 25 milliGRAM(s) Oral two times a day  mupirocin 2% Ointment 1 Application(s) Topical two times a day  norepinephrine Infusion 0.25 MICROgram(s)/kG/Min IV Continuous <Continuous>  pantoprazole  Injectable 40 milliGRAM(s) IV Push every 12 hours  sodium chloride 0.9%. 1000 milliLiter(s) IV Continuous <Continuous>                            9.4    18.84 )-----------( 183      ( 09 Jul 2021 04:17 )             31.3       07-09    126<L>  |  87<L>  |  18  ----------------------------<  295<H>  4.0   |  17<L>  |  6.42<H>    Ca    8.3<L>      09 Jul 2021 04:17  Phos  4.8     07-09  Mg     2.00     07-09    TPro  6.0  /  Alb  3.2<L>  /  TBili  0.9  /  DBili  x   /  AST  31  /  ALT  10  /  AlkPhos  66  07-09      CARDIAC MARKERS ( 08 Jul 2021 12:32 )  x     / x     / 49 U/L / x     / x            T(C): 36.6 (07-09-21 @ 12:00), Max: 38 (07-09-21 @ 00:00)  HR: 65 (07-09-21 @ 12:15) (55 - 104)  BP: 102/86 (07-09-21 @ 12:15) (45/32 - 141/66)  RR: 16 (07-09-21 @ 12:15) (14 - 31)  SpO2: 96% (07-09-21 @ 12:15) (92% - 100%)  Wt(kg): --    I&O's Summary    08 Jul 2021 07:01  -  09 Jul 2021 07:00  --------------------------------------------------------  IN: 1097 mL / OUT: 0 mL / NET: 1097 mL        General: Well nourished in no acute distress. Alert and Oriented * 3.   Head: Normocephalic and atraumatic.   Neck: No JVD. No bruits. Supple. Does not appear to be enlarged.   Cardiovascular: + S1,S2 ; RRR Soft systolic murmur at the left lower sternal border. No rubs noted.    Lungs: CTA b/l. No rhonchi, rales or wheezes.   Abdomen: + BS, soft. Non tender. Non distended. No rebound. No guarding.   Extremities: No clubbing/cyanosis/edema.   Neurologic: Moves all four extremities. Full range of motion.   Skin: Warm and moist. The patient's skin has normal elasticity and good skin turgor.   Psychiatric: Appropriate mood and affect.  Musculoskeletal: Normal range of motion, normal strength      A/P) 71 y/o male PMH CAD s/p CABG, PAF/AFL on coumadin, HTN, hyperlipidemia, DM, ESRD on HD, and hypothyroidism a/w sepis    -continue metoprolol for rate control  -continue coumadin for stroke prevention if no contraindication (f/u GI)  -d/c amio as he's in persistent AFL  -no further inpatient EP workup needed    -supportive are as per MICU  -f/u cardiology  -no indication for PPM at this time     EP ATTENDING    tele: typical AFL with PVCs, HR 60s, no evidence of heart block    he denies palpitations, syncope, nor angina, ROS otherwise -      Review of Systems:   Constitutional: [ ] fevers, [ ] chills.   Skin: [ ] dry skin. [ ] rashes.  Psychiatric: [ ] depression, [ ] anxiety.   Gastrointestinal: [ ] BRBPR, [ ] melena.   Neurological: [ ] confusion. [ ] seizures. [ ] shuffling gait.   Ears,Nose,Mouth and Throat: [ ] ear pain [ ] sore throat.   Eyes: [ ] diplopia.   Respiratory: [ ] hemoptysis. [ ] shortness of breath  Cardiovascular: See HPI above  Hematologic/Lymphatic: [ ] anemia. [ ] painful nodes. [ ] prolonged bleeding.   Genitourinary: [ ] hematuria. [ ] flank pain.   Endocrine: [ ] significant change in weight. [ ] intolerance to heat and cold.     Review of systems [ x] otherwise negative, [ ] otherwise unable to obtain    FH: no family history of sudden cardiac death in first degree relatives    SH: [ ] tobacco, [ ] alcohol, [ ] drugs    acetaminophen   Tablet .. 1000 milliGRAM(s) Oral every 6 hours PRN  aMIOdarone    Tablet 200 milliGRAM(s) Oral daily  aspirin  chewable 81 milliGRAM(s) Oral daily  atorvastatin 40 milliGRAM(s) Oral at bedtime  calamine/zinc oxide Lotion 1 Application(s) Topical three times a day PRN  ceFAZolin   IVPB 1000 milliGRAM(s) IV Intermittent every 24 hours  chlorhexidine 2% Cloths 1 Application(s) Topical daily  dextrose 40% Gel 15 Gram(s) Oral once  dextrose 5%. 1000 milliLiter(s) IV Continuous <Continuous>  dextrose 5%. 1000 milliLiter(s) IV Continuous <Continuous>  dextrose 50% Injectable 25 Gram(s) IV Push once  dextrose 50% Injectable 12.5 Gram(s) IV Push once  dextrose 50% Injectable 25 Gram(s) IV Push once  epoetin danilo-epbx (RETACRIT) Injectable 03524 Unit(s) IV Push <User Schedule>  glucagon  Injectable 1 milliGRAM(s) IntraMuscular once  heparin   Injectable 5000 Unit(s) SubCutaneous every 8 hours  insulin lispro (ADMELOG) corrective regimen sliding scale   SubCutaneous three times a day before meals  insulin lispro (ADMELOG) corrective regimen sliding scale   SubCutaneous at bedtime  metoprolol tartrate 25 milliGRAM(s) Oral two times a day  mupirocin 2% Ointment 1 Application(s) Topical two times a day  norepinephrine Infusion 0.25 MICROgram(s)/kG/Min IV Continuous <Continuous>  pantoprazole  Injectable 40 milliGRAM(s) IV Push every 12 hours  sodium chloride 0.9%. 1000 milliLiter(s) IV Continuous <Continuous>                            9.4    18.84 )-----------( 183      ( 09 Jul 2021 04:17 )             31.3       07-09    126<L>  |  87<L>  |  18  ----------------------------<  295<H>  4.0   |  17<L>  |  6.42<H>    Ca    8.3<L>      09 Jul 2021 04:17  Phos  4.8     07-09  Mg     2.00     07-09    TPro  6.0  /  Alb  3.2<L>  /  TBili  0.9  /  DBili  x   /  AST  31  /  ALT  10  /  AlkPhos  66  07-09      CARDIAC MARKERS ( 08 Jul 2021 12:32 )  x     / x     / 49 U/L / x     / x            T(C): 36.6 (07-09-21 @ 12:00), Max: 38 (07-09-21 @ 00:00)  HR: 65 (07-09-21 @ 12:15) (55 - 104)  BP: 102/86 (07-09-21 @ 12:15) (45/32 - 141/66)  RR: 16 (07-09-21 @ 12:15) (14 - 31)  SpO2: 96% (07-09-21 @ 12:15) (92% - 100%)  Wt(kg): --    I&O's Summary    08 Jul 2021 07:01  -  09 Jul 2021 07:00  --------------------------------------------------------  IN: 1097 mL / OUT: 0 mL / NET: 1097 mL        General: Well nourished in no acute distress. Alert and Oriented * 3.   Head: Normocephalic and atraumatic.   Neck: No JVD. No bruits. Supple. Does not appear to be enlarged.   Cardiovascular: + S1,S2 ; RRR Soft systolic murmur at the left lower sternal border. No rubs noted.    Lungs: CTA b/l. No rhonchi, rales or wheezes.   Abdomen: + BS, soft. Non tender. Non distended. No rebound. No guarding.   Extremities: No clubbing/cyanosis/edema.   Neurologic: Moves all four extremities. Full range of motion.   Skin: Warm and moist. The patient's skin has normal elasticity and good skin turgor.   Psychiatric: Appropriate mood and affect.  Musculoskeletal: Normal range of motion, normal strength      A/P) 71 y/o male PMH CAD s/p CABG, PAF/AFL on coumadin, HTN, hyperlipidemia, DM, ESRD on HD, and hypothyroidism a/w sepsis    -continue metoprolol for rate control  -continue coumadin for stroke prevention if no contraindication (f/u GI)  -d/c amio as he's in persistent AFL  -no further inpatient EP workup needed    -supportive are as per MICU  -f/u cardiology  -no indication for PPM at this time

## 2021-07-09 NOTE — DIETITIAN INITIAL EVALUATION ADULT. - PHYSCIAL ASSESSMENT
No significant findings of muscle mass depletion or subcutaneous fat loss.  No noted pressure injuries, per flow sheets. well nourished/overweight

## 2021-07-09 NOTE — DIETITIAN INITIAL EVALUATION ADULT. - PROBLEM SELECTOR PLAN 5
Aflutter with RBBB   -Tele shows HR <90   -C/w metoprolol 25 mg BID and amiodarone 200 mg daily  -[id number] score is 7. However, hold warfarin for now as patient reported to have GIB bleeding

## 2021-07-09 NOTE — PROGRESS NOTE ADULT - ASSESSMENT
71 yo M with PMhx of CAD s/p CABG (course at that time complicated by pleural effusion requiring chest tubes), ESRD on HD (MWF), DMII, HTN, afib on coumadin, and anemia presented from [facility] for evaluation of chest pain and GIB.       left sided pleural effusion: loculated and chronic:   CAD:  CABG:  ESRD:   DM:  HTN:  A fibrillation:   GI Bleed    7/7:    left sided pleural effusion: loculated and chronic: he has chr loculated pleural effusion on left side: He had chest tube placement in last June 2020 at another hospital: no chemistry is available but he had negative cytology at that time: This time the effusion seems slightly worse and has loculated effusion with pleural thickening: heis not SOB andhasbeen on roomair: I think , it at all, if this effusion needs to be dealt with : it is probably vats: will contact thoracic surgery : etiology not very clear as there is no previous chemistry: coult be exudative or transudatve: he is on HD and now it is a chr christiano effusion   CAD: cont per cards  CABG: cont current meds  ESRD: on HD   DM: monitor and control  HTN: controlled  A fibrillation: off ac:   GI Bleed: per gi :  DW pt and t [email or web address]    7/8:    left sided pleural effusion:: fever: s/p left sided chest tube placement: cultures sent: however with pr criteria it seems transudative await serum LDH but pleural fluid LDH is low:  already started on broad spectrum antibiotics ID to see: await cultures:   CAD: cont per cards  CABG: cont current meds  ESRD: on HD   DM: monitor and control  HTN: controlled  A fibrillation: off ac:   GI Bleed: per gi :  DW pmd    7/9:    left sided pleural effusion:: fever: s/p left sided chest tube placement: cultures sent: however with pr criteria it seems borderline transudative and transudative by LDH criteria: the vulutres ahve been negative so far:  already started on broad spectrum antibiotics ID to see: await cultures: ? source of spesis  CAD: cont per cards  CABG: cont current meds  ESRD: on HD   DM: monitor and control  HTN: in shock: now on vasopressors:   A fibrillation: off ac:   GI Bleed: per gi :  DW pmd  micu team

## 2021-07-09 NOTE — PROGRESS NOTE ADULT - SUBJECTIVE AND OBJECTIVE BOX
Follow Up:      Inverval History/ROS:Patient is a 70y old  Male who presents with a chief complaint of Chest pain and GIB (09 Jul 2021 10:36)    More alert.   C/o low back pain.   C/o pain to right wrist after phlebotomy  + fever    Allergies    lisinopril (Other)  opioid-like analgesics (Other)    Intolerances        ANTIMICROBIALS:  piperacillin/tazobactam IVPB.. 3.375 every 12 hours      OTHER MEDS:  acetaminophen   Tablet .. 1000 milliGRAM(s) Oral every 6 hours PRN  aMIOdarone    Tablet 200 milliGRAM(s) Oral daily  aspirin  chewable 81 milliGRAM(s) Oral daily  atorvastatin 40 milliGRAM(s) Oral at bedtime  calamine/zinc oxide Lotion 1 Application(s) Topical three times a day PRN  chlorhexidine 2% Cloths 1 Application(s) Topical daily  chlorhexidine 4% Liquid 1 Application(s) Topical <User Schedule>  dextrose 40% Gel 15 Gram(s) Oral once  dextrose 5%. 1000 milliLiter(s) IV Continuous <Continuous>  dextrose 5%. 1000 milliLiter(s) IV Continuous <Continuous>  dextrose 50% Injectable 25 Gram(s) IV Push once  dextrose 50% Injectable 12.5 Gram(s) IV Push once  dextrose 50% Injectable 25 Gram(s) IV Push once  epoetin danilo-epbx (RETACRIT) Injectable 43738 Unit(s) IV Push <User Schedule>  glucagon  Injectable 1 milliGRAM(s) IntraMuscular once  heparin   Injectable 5000 Unit(s) SubCutaneous every 8 hours  insulin lispro (ADMELOG) corrective regimen sliding scale   SubCutaneous three times a day before meals  insulin lispro (ADMELOG) corrective regimen sliding scale   SubCutaneous at bedtime  metoprolol tartrate 25 milliGRAM(s) Oral two times a day  mupirocin 2% Ointment 1 Application(s) Topical two times a day  norepinephrine Infusion 0.25 MICROgram(s)/kG/Min IV Continuous <Continuous>  pantoprazole  Injectable 40 milliGRAM(s) IV Push every 12 hours  sodium chloride 0.9%. 1000 milliLiter(s) IV Continuous <Continuous>      Vital Signs Last 24 Hrs  T(C): 36.4 (09 Jul 2021 08:03), Max: 38 (09 Jul 2021 00:00)  T(F): 97.5 (09 Jul 2021 08:03), Max: 100.4 (09 Jul 2021 00:00)  HR: 66 (09 Jul 2021 11:00) (59 - 104)  BP: 111/31 (09 Jul 2021 11:00) (45/32 - 141/66)  BP(mean): 50 (09 Jul 2021 11:00) (34 - 97)  RR: 24 (09 Jul 2021 11:00) (14 - 31)  SpO2: 98% (09 Jul 2021 11:00) (92% - 100%)    PHYSICAL EXAM:  General: [ x] non-toxic  HEAD/EYES: [ ] PERRL [x ] white sclera [ ] icterus  ENT:  [ ] normal [x ] supple [ ] thrush [ ] pharyngeal exudate  Cardiovascular:   [ ] murmur [x ] normal [ ] PPM/AICD  Respiratory:  [x ] clear to ausculation bilaterally  GI:  [x ] soft, non-tender, normal bowel sounds  :  [ ] wagner [x ] no CVA tenderness   Musculoskeletal:  [x ] tender to right wrist  Neurologic:  [ ] non-focal exam   Skin:  [x ] no rash  Lymph: [ x] no lymphadenopathy  Psychiatric:  [ ] appropriate affect [x ] alert & oriented  Lines:  [x ] no phlebitis [ ] central line                                9.4    18.84 )-----------( 183      ( 09 Jul 2021 04:17 )             31.3       07-09    126<L>  |  87<L>  |  18  ----------------------------<  295<H>  4.0   |  17<L>  |  6.42<H>    Ca    8.3<L>      09 Jul 2021 04:17  Phos  4.8     07-09  Mg     2.00     07-09    TPro  6.0  /  Alb  3.2<L>  /  TBili  0.9  /  DBili  x   /  AST  31  /  ALT  10  /  AlkPhos  66  07-09          MICROBIOLOGY:Culture Results:   Growth in aerobic bottle: Gram Positive Cocci in Clusters  Growth in anaerobic bottle: Gram Positive Cocci in Clusters  ***Blood Panel PCR results on this specimen are available  approximately 3 hours after the Gram stain result.***  Gram stain, PCR, and/or culture results may not always  correspond due to difference in methodologies.  ************************************************************  This PCR assay was performed by multiplex PCR. This  Assay tests for 66 bacterial and resistance gene targets.  Please refer to the [facility] test directory  at [email or web address] for details. (07-07-21 @ 23:06)  Culture Results:   Growth in aerobic bottle: Gram Positive Cocci in Clusters  Growth in anaerobic bottle: Gram Positive Cocci in Clusters (07-07-21 @ 23:05)      RADIOLOGY:

## 2021-07-09 NOTE — PROGRESS NOTE ADULT - SUBJECTIVE AND OBJECTIVE BOX
Date of Service: 07-09-21 @ 11:40    Patient is a 70y old  Male who presents with a chief complaint of Chest pain and GIB (09 Jul 2021 10:36)      Any change in ROS: seems to be doing same: still on vasopressors: not vented:     MEDICATIONS  (STANDING):  aMIOdarone    Tablet 200 milliGRAM(s) Oral daily  aspirin  chewable 81 milliGRAM(s) Oral daily  atorvastatin 40 milliGRAM(s) Oral at bedtime  chlorhexidine 2% Cloths 1 Application(s) Topical daily  chlorhexidine 4% Liquid 1 Application(s) Topical <User Schedule>  dextrose 40% Gel 15 Gram(s) Oral once  dextrose 5%. 1000 milliLiter(s) (50 mL/Hr) IV Continuous <Continuous>  dextrose 5%. 1000 milliLiter(s) (100 mL/Hr) IV Continuous <Continuous>  dextrose 50% Injectable 25 Gram(s) IV Push once  dextrose 50% Injectable 12.5 Gram(s) IV Push once  dextrose 50% Injectable 25 Gram(s) IV Push once  epoetin danilo-epbx (RETACRIT) Injectable 88587 Unit(s) IV Push <User Schedule>  glucagon  Injectable 1 milliGRAM(s) IntraMuscular once  heparin   Injectable 5000 Unit(s) SubCutaneous every 8 hours  insulin lispro (ADMELOG) corrective regimen sliding scale   SubCutaneous three times a day before meals  insulin lispro (ADMELOG) corrective regimen sliding scale   SubCutaneous at bedtime  metoprolol tartrate 25 milliGRAM(s) Oral two times a day  mupirocin 2% Ointment 1 Application(s) Topical two times a day  norepinephrine Infusion 0.25 MICROgram(s)/kG/Min (40.4 mL/Hr) IV Continuous <Continuous>  pantoprazole  Injectable 40 milliGRAM(s) IV Push every 12 hours  piperacillin/tazobactam IVPB.. 3.375 Gram(s) IV Intermittent every 12 hours  sodium chloride 0.9%. 1000 milliLiter(s) (50 mL/Hr) IV Continuous <Continuous>    MEDICATIONS  (PRN):  acetaminophen   Tablet .. 1000 milliGRAM(s) Oral every 6 hours PRN Temp greater or equal to 38C (100.4F), Mild Pain (1 - 3), Moderate Pain (4 - 6)  calamine/zinc oxide Lotion 1 Application(s) Topical three times a day PRN Itching    Vital Signs Last 24 Hrs  T(C): 36.4 (09 Jul 2021 08:03), Max: 38 (09 Jul 2021 00:00)  T(F): 97.5 (09 Jul 2021 08:03), Max: 100.4 (09 Jul 2021 00:00)  HR: 66 (09 Jul 2021 11:00) (59 - 104)  BP: 111/31 (09 Jul 2021 11:00) (45/32 - 141/66)  BP(mean): 50 (09 Jul 2021 11:00) (34 - 97)  RR: 24 (09 Jul 2021 11:00) (14 - 31)  SpO2: 98% (09 Jul 2021 11:00) (92% - 100%)    I&O's Summary    08 Jul 2021 07:01  -  09 Jul 2021 07:00  --------------------------------------------------------  IN: 1097 mL / OUT: 0 mL / NET: 1097 mL          Physical Exam:   GENERAL: NAD, well-groomed, well-developed  HEENT: KATHY/   Atraumatic, Normocephalic  ENMT: No tonsillar erythema, exudates, or enlargement; Moist mucous membranes, Good dentition, No lesions  NECK: Supple, No JVD, Normal thyroid  CHEST/LUNG: Clear to auscultaion, ; No rales, rhonchi, wheezing, or rubs  CVS: Regular rate and rhythm; No murmurs, rubs, or gallops  GI: : Soft, Nontender, Nondistended; Bowel sounds present  NERVOUS SYSTEM:  Alert & awake  EXTREMITIES: - edema  LYMPH: No lymphadenopathy noted  SKIN: No rashes or lesions  ENDOCRINOLOGY: No Thyromegaly  PSYCH: Appropriate    Labs:  26  CARDIAC MARKERS ( 08 Jul 2021 12:32 )  x     / x     / 49 U/L / x     / x                                9.4    18.84 )-----------( 183      ( 09 Jul 2021 04:17 )             31.3                         10.6   11.98 )-----------( 171      ( 08 Jul 2021 12:21 )             34.7                         10.6   9.47  )-----------( 149      ( 07 Jul 2021 17:50 )             34.9                         9.6    6.93  )-----------( 140      ( 07 Jul 2021 08:23 )             30.4                         9.3    7.82  )-----------( 136      ( 06 Jul 2021 04:45 )             30.1     07-09    126<L>  |  87<L>  |  18  ----------------------------<  295<H>  4.0   |  17<L>  |  6.42<H>  07-08    134<L>  |  95<L>  |  15  ----------------------------<  186<H>  3.9   |  24  |  6.10<H>  07-08    TNP  |  TNP  |  TNP  ----------------------------<  TNP  TNP   |  TNP  |  TNP  07-07    137  |  96<L>  |  10  ----------------------------<  241<H>  3.8   |  23  |  4.65<H>  07-07    133<L>  |  91<L>  |  21  ----------------------------<  120<H>  3.5   |  25  |  7.48<H>  07-06    136  |  95<L>  |  13  ----------------------------<  102<H>  3.7   |  27  |  5.12<H>    Ca    8.3<L>      09 Jul 2021 04:17  Ca    8.7      08 Jul 2021 12:32  Ca    TNP      08 Jul 2021 06:58  Ca    9.0      07 Jul 2021 18:29  Phos  4.8     07-09  Phos  3.4     07-08  Phos  TNP     07-08  Mg     2.00     07-09  Mg     1.70     07-08  Mg     TNP     07-08    TPro  6.0  /  Alb  3.2<L>  /  TBili  0.9  /  DBili  x   /  AST  31  /  ALT  10  /  AlkPhos  66  07-09  TPro  6.5  /  Alb  3.5  /  TBili  0.9  /  DBili  x   /  AST  13  /  ALT  7   /  AlkPhos  71  07-08  TPro  TNP  /  Alb  TNP  /  TBili  TNP  /  DBili  x   /  AST  TNP  /  ALT  TNP  /  AlkPhos  TNP  07-08  TPro  7.3  /  Alb  4.1  /  TBili  0.9  /  DBili  x   /  AST  16  /  ALT  9   /  AlkPhos  83  07-07  TPro  6.6  /  Alb  3.6  /  TBili  1.0  /  DBili  x   /  AST  11  /  ALT  7   /  AlkPhos  73  07-07  TPro  6.2  /  Alb  3.7  /  TBili  0.8  /  DBili  x   /  AST  13  /  ALT  7   /  AlkPhos  72  07-06    CAPILLARY BLOOD GLUCOSE      POCT Blood Glucose.: 187 mg/dL (09 Jul 2021 08:23)  POCT Blood Glucose.: 197 mg/dL (08 Jul 2021 21:59)  POCT Blood Glucose.: 198 mg/dL (08 Jul 2021 19:36)  POCT Blood Glucose.: 214 mg/dL (08 Jul 2021 17:47)  POCT Blood Glucose.: 138 mg/dL (08 Jul 2021 13:56)  POCT Blood Glucose.: 169 mg/dL (08 Jul 2021 11:57)      LIVER FUNCTIONS - ( 09 Jul 2021 04:17 )  Alb: 3.2 g/dL / Pro: 6.0 g/dL / ALK PHOS: 66 U/L / ALT: 10 U/L / AST: 31 U/L / GGT: x           PT/INR - ( 08 Jul 2021 12:21 )   PT: 19.1 sec;   INR: 1.72 ratio         PTT - ( 08 Jul 2021 12:21 )  PTT:28.7 sec    Lactate, Blood: 2.4 mmol/L (07-08 @ 12:25)  Lactate, Blood: 1.1 mmol/L (07-08 @ 06:58)  Lactate, Blood: 2.7 mmol/L (07-07 @ 18:29)  Fluid Source PLEURAL  Albumin, Fluid--  Glucose, Nsaal971 mg/dL  Protein total, Fluid3.2 g/dL  Lacatate Dehydrogenase, Fluid67 U/L  pH, Fluid--  Cytopathology-Non Gyn Report--        RECENT CULTURES:  07-08 @ 20:48 .Body Fluid pleural fluid       polymorphonuclear leukocytes seen  No organisms seen  by cytocentrifuge             07-07 @ 23:06 .Blood Blood-Venous   PCR    Growth in aerobic bottle: Gram Positive Cocci in Clusters  Growth in anaerobic bottle: Gram Positive Cocci in Clusters    Blood Culture PCR  Blood Culture PCR     Growth in aerobic bottle: Gram Positive Cocci in Clusters  Growth in anaerobic bottle: Gram Positive Cocci in Clusters  ***Blood Panel PCR results on this specimen are available  approximately 3 hours after the Gram stain result.***  Gram stain, PCR, and/or culture results may not always  correspond due to difference in methodologies.  ************************************************************  This PCR assay was performed by multiplex PCR. This  Assay tests for 66 bacterial and resistance gene targets.  Please refer to the [facility] test directory  at [email or web address] for details.    07-07 @ 23:05 .Blood Blood       Growth in aerobic bottle: Gram Positive Cocci in Clusters  Growth in anaerobic bottle: Gram Positive Cocci in Clusters           Growth in aerobic bottle: Gram Positive Cocci in Clusters  Growth in anaerobic bottle: Gram Positive Cocci in Clusters          RESPIRATORY CULTURES:        r< from: Xray Chest 1 View- PORTABLE-Routine (Xray Chest 1 View- PORTABLE-Routine in AM.) (07.09.21 @ 07:34) >  INTERPRETATION:    July 7 at 9:40 PM:  Moderate size loculated left effusion with underlying atelectasis is seen. Left upper lobe and right lung appear clear. Sternotomy wires are present. No pneumothorax. Left subclavian and brachiocephalic stents.    July 8:  1:47 PM:  No interval change. Left pleural effusion again seen with clear lungs.    6:10 PM:  Since the last study, a left-sided pigtail catheter has been placed. No decrease in the pleural effusion from the study of the day before. Linear atelectasis in the right midlung field.    July 9:  7:34 AM:  Left-sided pigtail catheter unchanged. Residual pleural effusion on this side again seen with no focal consolidations are identified in the visible lung.      COMPARISON:  July 4      IMPRESSION:  Follow-up studies with moderate size loculated leftpleural effusion pre and post pigtail catheter placement.    --- End of Report ---              GERMAINE MARTINEZ MD; Attending Radiologist  This document has been electronically signed. Jul 9 2021 10:37AM    < end of copied text >    Studies  Chest X-RAY  CT SCAN Chest   Venous Dopplers: LE:   CT Abdomen  Others

## 2021-07-09 NOTE — PROGRESS NOTE ADULT - SUBJECTIVE AND OBJECTIVE BOX
[facility] - S Eugene / Marlena S /D Cruz/ S Narendra/ WILY Macias/ Alexandre Esposito / WILMER Hermosillou/ O Jessi  service -[phone], office [phone]  ---------------------------------------------------------------------------------------------------------------    Patient seen and examined bedside in MICU    Subjective and Objective: overnight events noted. s/p RRT for hypotension yesterday for SBP 70s, got transfered to MICU, started on norepinephrine drip  denies cp/sob curently. "tired"     Allergies: lisinopril (Other)  opioid-like analgesics (Other)      Hospital Medications:   MEDICATIONS  (STANDING):  aspirin  chewable 81 milliGRAM(s) Oral daily  atorvastatin 40 milliGRAM(s) Oral at bedtime  ceFAZolin   IVPB 1000 milliGRAM(s) IV Intermittent every 24 hours  chlorhexidine 2% Cloths 1 Application(s) Topical daily  dextrose 40% Gel 15 Gram(s) Oral once  dextrose 5%. 1000 milliLiter(s) (50 mL/Hr) IV Continuous <Continuous>  dextrose 5%. 1000 milliLiter(s) (100 mL/Hr) IV Continuous <Continuous>  dextrose 50% Injectable 25 Gram(s) IV Push once  dextrose 50% Injectable 12.5 Gram(s) IV Push once  dextrose 50% Injectable 25 Gram(s) IV Push once  epoetin danilo-epbx (RETACRIT) Injectable 53604 Unit(s) IV Push <User Schedule>  glucagon  Injectable 1 milliGRAM(s) IntraMuscular once  heparin   Injectable 5000 Unit(s) SubCutaneous every 8 hours  insulin lispro (ADMELOG) corrective regimen sliding scale   SubCutaneous three times a day before meals  insulin lispro (ADMELOG) corrective regimen sliding scale   SubCutaneous at bedtime  metoprolol tartrate 25 milliGRAM(s) Oral two times a day  mupirocin 2% Ointment 1 Application(s) Topical two times a day  norepinephrine Infusion 0.25 MICROgram(s)/kG/Min (40.4 mL/Hr) IV Continuous <Continuous>  pantoprazole  Injectable 40 milliGRAM(s) IV Push every 12 hours  sodium chloride 0.9%. 1000 milliLiter(s) (50 mL/Hr) IV Continuous <Continuous>      VITALS:  T(F): 97.9 (07-09-21 @ 12:00), Max: 100.4 (07-09-21 @ 00:00)  HR: 57 (07-09-21 @ 14:00)  BP: 107/25 (07-09-21 @ 14:00)  RR: 21 (07-09-21 @ 14:00)  SpO2: 95% (07-09-21 @ 14:00)  Wt(kg): --    07-08 @ 07:01  -  07-09 @ 07:00  --------------------------------------------------------  IN: 1097 mL / OUT: 0 mL / NET: 1097 mL      PHYSICAL EXAM:  Constitutional: NAD  HEENT: anicteric sclera  Neck: No JVD  Respiratory: CTAB, no wheezes, rales or rhonchi. +chest tube  Cardiovascular: S1, S2, RRR  Gastrointestinal: BS+, soft, NT/ND  Extremities: trace peripheral edema  Neurological: A/O x 3  Psychiatric: Normal mood, normal affect  : No wagner.   Vascular Access: WES avf+thrill       LABS:  07-09    126<L>  |  87<L>  |  18  ----------------------------<  295<H>  4.0   |  17<L>  |  6.42<H>    Ca    8.3<L>      09 Jul 2021 04:17  Phos  4.8     07-09  Mg     2.00     07-09    TPro  6.0  /  Alb  3.2<L>  /  TBili  0.9  /  DBili      /  AST  31  /  ALT  10  /  AlkPhos  66  07-09    Creatinine Trend: 6.42 <--, 6.10 <--, TNP <--, 4.65 <--, 7.48 <--, 5.12 <--, 5.01 <--, 4.25 <--                        9.4    18.84 )-----------( 183      ( 09 Jul 2021 04:17 )             31.3     Urine Studies:        RADIOLOGY & ADDITIONAL STUDIES:

## 2021-07-09 NOTE — DIETITIAN INITIAL EVALUATION ADULT. - PERTINENT MEDS FT
MEDICATIONS  (STANDING):  aMIOdarone    Tablet 200 milliGRAM(s) Oral daily  aspirin  chewable 81 milliGRAM(s) Oral daily  atorvastatin 40 milliGRAM(s) Oral at bedtime  ceFAZolin   IVPB 1000 milliGRAM(s) IV Intermittent every 24 hours  chlorhexidine 2% Cloths 1 Application(s) Topical daily  dextrose 40% Gel 15 Gram(s) Oral once  dextrose 5%. 1000 milliLiter(s) (50 mL/Hr) IV Continuous <Continuous>  dextrose 5%. 1000 milliLiter(s) (100 mL/Hr) IV Continuous <Continuous>  dextrose 50% Injectable 25 Gram(s) IV Push once  dextrose 50% Injectable 12.5 Gram(s) IV Push once  dextrose 50% Injectable 25 Gram(s) IV Push once  epoetin danilo-epbx (RETACRIT) Injectable 65234 Unit(s) IV Push <User Schedule>  glucagon  Injectable 1 milliGRAM(s) IntraMuscular once  heparin   Injectable 5000 Unit(s) SubCutaneous every 8 hours  insulin lispro (ADMELOG) corrective regimen sliding scale   SubCutaneous three times a day before meals  insulin lispro (ADMELOG) corrective regimen sliding scale   SubCutaneous at bedtime  metoprolol tartrate 25 milliGRAM(s) Oral two times a day  mupirocin 2% Ointment 1 Application(s) Topical two times a day  norepinephrine Infusion 0.25 MICROgram(s)/kG/Min (40.4 mL/Hr) IV Continuous <Continuous>  pantoprazole  Injectable 40 milliGRAM(s) IV Push every 12 hours  sodium chloride 0.9%. 1000 milliLiter(s) (50 mL/Hr) IV Continuous <Continuous>

## 2021-07-09 NOTE — PROGRESS NOTE ADULT - ASSESSMENT
70 year old male with possible GI bleed     r/o GI bleed  negative occult w/o overt gi bleeding (yellow/brown stools)  Monitor CBC and Keep hemoglobin > 8 given cardiac issues   PPI BID w/Maalox PRN    Avoid NSAIDS; No GI objection to ASA or Heparin gtt   Zofran PRN for Nausea  no urgent endoscopic evaluation as H/H stable and negative occult  possible inpatient scope when fully optimized by medicine and cardio     CAD  Care per cardiology and MICU appreciated       Advanced care planning was discussed with patient.  Advanced care planning forms were reviewed and discussed.  Risks, benefits and alternatives of gastroenterologic procedures were discussed in detail and all questions were answered.  30 minutes spent.

## 2021-07-09 NOTE — PROGRESS NOTE ADULT - ASSESSMENT
71 yo M with PMhx of CAD s/p CABG (course at that time complicated by pleural effusion requiring chest tubes), ESRD on HD (MWF), DMII, HTN, afib on coumadin, and anemia presented from [facility] for evaluation of chest pain and GIB. Renal following for ESRD Mx.    ESRD  Maintenance schedule MWF  K ok  hyponatremia, M acidosis noted  currently bp stable, on iv pressor. HD arranged for bedside w/2k bath, no net uf removal, low bath temp. will need to abort hd if becomes hemodynamically unstable during hd   Renal diet  dose all meds for ESRD  h/o HTN, now hypotensive/sepsis, on norepinephrine drip. f/u cxs. Mx per ICU  Agree with infectious w/u. On abx, f/u BCx. s/p L plueral pigtail catheter. on Cefazolin, renally dosed  d/c metoprolol     Anemia in CKD+GIB- Hb <goal  c/w Epo 20k tiw with HD  monitor h/h, neg FOBT  GI following, No plans for cscope.     Renal osteodystrophy  Phos at goal    labs, chart reviewed  d/w pt, family bedside, HD RN    [facility]  Cell - [phone]  Office [phone]  Ans Serv [phone]

## 2021-07-09 NOTE — PROGRESS NOTE ADULT - ATTENDING COMMENTS
70 M with CABG, ESRD on HD, known history of L pleural effusion, afib on a/c here with chest pain and concern for GIB, now with septic shock due to staph aureus bacteremia requiring pressors.    - source still unclear, will likely need imaging of back when more hemodynamically stable given back pain (has good strength in LE, no incontinence)  - c/w cefazolin (will need to dose at night to avoid issues with HD)  - no abdominal tenderness today  - GIB: h/h stable  - small amount of effusion on left, will d/w ct surgery re: removing chest tube (fluid is transudative and is chronic, if he is sob from it, he would likely need VATS)  - u/s of wrist given ?erythema there

## 2021-07-09 NOTE — DIETITIAN INITIAL EVALUATION ADULT. - ORAL INTAKE PTA/DIET HISTORY
Eats 3 meals/day.  Prepared at home by wife.  Drinks water,but restricts fluid intake.  Also reports that he restricts sodium, K+, Phosphorus, as well as "sugar".  Good appetite/PO intake PTA without any overt changes.

## 2021-07-09 NOTE — DIETITIAN INITIAL EVALUATION ADULT. - OTHER INFO
71yo M w PMH CAD, ESRD on HD, DM, HTN, hypercholesterolemia, CVA, Aflutter on Warfarin.  Admitted for chest pain and possible GIB.  Now with hypoxic respiratory failure, chest tubes placed for pleural effusion.  Pt. became hypotensive, RRT (7/8) and transferred to MICU.  Currently observed on NC.    Spoke w RN & met with Pt (alert, although disoriented).  Pt. noted/reported with good PO intake consuming >75% of most meals.  He endorses good appetite & denies food allergies, nausea/vomiting/diarrhea/constipation, or issues with chewing/swallowing.  BM (7/8).    RDN reviewed diet.  Pt. able to demonstrate & verbalize prior knowledge of therapeutic diet modifications.   Self monitors blood glucose 2-3x/day.  Denies hypoglycemia PTA.   Unable to specify usual [dry] weight post HD.

## 2021-07-09 NOTE — PROGRESS NOTE ADULT - SUBJECTIVE AND OBJECTIVE BOX
INTERVAL HPI/OVERNIGHT EVENTS:    events noted, now in micu   no rectal bleeding     MEDICATIONS  (STANDING):  aMIOdarone    Tablet 200 milliGRAM(s) Oral daily  aspirin  chewable 81 milliGRAM(s) Oral daily  atorvastatin 40 milliGRAM(s) Oral at bedtime  chlorhexidine 2% Cloths 1 Application(s) Topical daily  chlorhexidine 4% Liquid 1 Application(s) Topical <User Schedule>  dextrose 40% Gel 15 Gram(s) Oral once  dextrose 5%. 1000 milliLiter(s) (50 mL/Hr) IV Continuous <Continuous>  dextrose 5%. 1000 milliLiter(s) (100 mL/Hr) IV Continuous <Continuous>  dextrose 50% Injectable 25 Gram(s) IV Push once  dextrose 50% Injectable 12.5 Gram(s) IV Push once  dextrose 50% Injectable 25 Gram(s) IV Push once  epoetin danilo-epbx (RETACRIT) Injectable 91841 Unit(s) IV Push <User Schedule>  glucagon  Injectable 1 milliGRAM(s) IntraMuscular once  heparin   Injectable 5000 Unit(s) SubCutaneous every 8 hours  insulin lispro (ADMELOG) corrective regimen sliding scale   SubCutaneous three times a day before meals  insulin lispro (ADMELOG) corrective regimen sliding scale   SubCutaneous at bedtime  metoprolol tartrate 25 milliGRAM(s) Oral two times a day  mupirocin 2% Ointment 1 Application(s) Topical two times a day  norepinephrine Infusion 0.25 MICROgram(s)/kG/Min (40.4 mL/Hr) IV Continuous <Continuous>  pantoprazole  Injectable 40 milliGRAM(s) IV Push every 12 hours  piperacillin/tazobactam IVPB.. 3.375 Gram(s) IV Intermittent every 12 hours  sodium chloride 0.9%. 1000 milliLiter(s) (50 mL/Hr) IV Continuous <Continuous>    MEDICATIONS  (PRN):  acetaminophen   Tablet .. 1000 milliGRAM(s) Oral every 6 hours PRN Temp greater or equal to 38C (100.4F), Mild Pain (1 - 3), Moderate Pain (4 - 6)  calamine/zinc oxide Lotion 1 Application(s) Topical three times a day PRN Itching      Allergies    lisinopril (Other)  opioid-like analgesics (Other)    Intolerances        Review of Systems:     General:  No wt loss, fevers, chills, night sweats, fatigue   Eyes:  Good vision, no reported pain  ENT:  No sore throat, pain, runny nose, dysphagia  CV:  No pain, palpitations, hypo/hypertension  Resp:  No dyspnea, cough, tachypnea, wheezing  GI:  No pain, No nausea, No vomiting, No diarrhea, No constipation, No weight loss, No fever, No pruritis, No rectal bleeding, No melena, No dysphagia  :  No pain, bleeding, incontinence, nocturia  Muscle:  No pain, weakness  Neuro:  No weakness, tingling, memory problems  Psych:  No fatigue, insomnia, mood problems, depression  Endocrine:  No polyuria, polydypsia, cold/heat intolerance  Heme:  No petechiae, ecchymosis, easy bruisability  Skin:  No rash, tattoos, scars, edema      Vital Signs Last 24 Hrs  T(C): 36.4 (09 Jul 2021 08:03), Max: 38 (09 Jul 2021 00:00)  T(F): 97.5 (09 Jul 2021 08:03), Max: 100.4 (09 Jul 2021 00:00)  HR: 66 (09 Jul 2021 08:03) (59 - 104)  BP: 95/38 (09 Jul 2021 08:03) (45/32 - 141/66)  BP(mean): 53 (09 Jul 2021 08:03) (34 - 97)  RR: 24 (09 Jul 2021 08:03) (14 - 28)  SpO2: 96% (09 Jul 2021 08:03) (92% - 100%)    PHYSICAL EXAM:    Constitutional: NAD  HEENT: EOMI, throat clear  Neck: No LAD, supple  Respiratory: CTA and P  Cardiovascular: S1 and S2, RRR, no M  Gastrointestinal: BS+, soft, NT/ND, neg HSM,  Extremities: No peripheral edema, neg clubbing, cyanosis  Vascular: 2+ peripheral pulses  Neurological: A/O  Psychiatric: Normal mood, normal affect  Skin: No rashes      LABS:                        9.4    18.84 )-----------( 183      ( 09 Jul 2021 04:17 )             31.3     07-09    126<L>  |  87<L>  |  18  ----------------------------<  295<H>  4.0   |  17<L>  |  6.42<H>    Ca    8.3<L>      09 Jul 2021 04:17  Phos  4.8     07-09  Mg     2.00     07-09    TPro  6.0  /  Alb  3.2<L>  /  TBili  0.9  /  DBili  x   /  AST  31  /  ALT  10  /  AlkPhos  66  07-09    PT/INR - ( 08 Jul 2021 12:21 )   PT: 19.1 sec;   INR: 1.72 ratio         PTT - ( 08 Jul 2021 12:21 )  PTT:28.7 sec      RADIOLOGY & ADDITIONAL TESTS:

## 2021-07-09 NOTE — CHART NOTE - NSCHARTNOTEFT_GEN_A_CORE
: Lisa    INDICATION: Pleural effusion    PROCEDURE:  [ ] LIMITED ECHO  [x] LIMITED CHEST  [ ] LIMITED RETROPERITONEAL  [ ] LIMITED ABDOMINAL  [ ] LIMITED DVT  [ ] NEEDLE GUIDANCE VASCULAR  [ ] NEEDLE GUIDANCE THORACENTESIS  [ ] NEEDLE GUIDANCE PARACENTESIS  [ ] NEEDLE GUIDANCE PERICARDIOCENTESIS  [ ] OTHER    FINDINGS:  A-line predominant, small hypoechoic fluid in LLL w/ lung hepatization and few air bronchograms    INTERPRETATION:  LLL consolidation/atelectasis w/ small pleural effusion    Ariel Gonzalez MD  PGY-5  [facility] Fellow  Pager [phone] : Lisa    INDICATION: Pleural effusion    PROCEDURE:  [ ] LIMITED ECHO  [x] LIMITED CHEST  [ ] LIMITED RETROPERITONEAL  [ ] LIMITED ABDOMINAL  [ ] LIMITED DVT  [ ] NEEDLE GUIDANCE VASCULAR  [ ] NEEDLE GUIDANCE THORACENTESIS  [ ] NEEDLE GUIDANCE PARACENTESIS  [ ] NEEDLE GUIDANCE PERICARDIOCENTESIS  [ ] OTHER    FINDINGS:  A-line predominant, small hypoechoic fluid in LLL w/ lung hepatization and few air bronchograms    INTERPRETATION:  LLL consolidation/atelectasis w/ small pleural effusion    Ariel Gonzalez MD  PGY-5  [facility] Fellow  Pager [phone]    Attending Attestation:  I was present during the key portions of the procedure and immediately available during the entire procedure.  Moraima Monroy MD  Attending  Pulmonary & Critical Care Medicine

## 2021-07-09 NOTE — DIETITIAN INITIAL EVALUATION ADULT. - PERTINENT LABORATORY DATA
07-09    126<L>  |  87<L>  |  18  ----------------------------<  295<H>  4.0   |  17<L>  |  6.42<H>    Ca    8.3<L>      09 Jul 2021 04:17  Phos  4.8     07-09  Mg     2.00     07-09    TPro  6.0  /  Alb  3.2<L>  /  TBili  0.9  /  DBili  x   /  AST  31  /  ALT  10  /  AlkPhos  66  07-09        HbA1c 5.5%      CAPILLARY BLOOD GLUCOSE      POCT Blood Glucose.: 202 mg/dL (09 Jul 2021 12:04)  POCT Blood Glucose.: 187 mg/dL (09 Jul 2021 08:23)  POCT Blood Glucose.: 197 mg/dL (08 Jul 2021 21:59)  POCT Blood Glucose.: 198 mg/dL (08 Jul 2021 19:36)  POCT Blood Glucose.: 214 mg/dL (08 Jul 2021 17:47)  POCT Blood Glucose.: 138 mg/dL (08 Jul 2021 13:56)

## 2021-07-09 NOTE — PROGRESS NOTE ADULT - ASSESSMENT
70 year old with ESRD, DM, CAD presented with anemia and chest pain    Now with fever and hypotension- developed during hospitalization/ not present at admission.      Newly diagnosed MSSA bacteremia    1) MSSA bacteremia    can change vanco/zosyn  to Cefazolin 1 gram iv daily (dose at 9 pm)     Repeat blood cultures every 48 hours until negative  Check echo    Consider imaging LS spine  Monitor right wrist pain    2) Fever  Likely due to bacteremia  Monitor    3) Pleural effusion  S/p thoracentesis- appears transudative but follow up Culture      Dw ICU

## 2021-07-09 NOTE — PROGRESS NOTE ADULT - SUBJECTIVE AND OBJECTIVE BOX
INTERVAL HPI/OVERNIGHT EVENTS:    SUBJECTIVE: Patient seen and examined at bedside.       VITAL SIGNS:  ICU Vital Signs Last 24 Hrs  T(C): 36.4 (09 Jul 2021 08:03), Max: 38 (09 Jul 2021 00:00)  T(F): 97.5 (09 Jul 2021 08:03), Max: 100.4 (09 Jul 2021 00:00)  HR: 66 (09 Jul 2021 11:00) (59 - 104)  BP: 111/31 (09 Jul 2021 11:00) (45/32 - 141/66)  BP(mean): 50 (09 Jul 2021 11:00) (34 - 97)  ABP: --  ABP(mean): --  RR: 24 (09 Jul 2021 11:00) (14 - 31)  SpO2: 98% (09 Jul 2021 11:00) (92% - 100%)      Plateau pressure:   P/F ratio:     07-08 @ 07:01  -  07-09 @ 07:00  --------------------------------------------------------  IN: 1097 mL / OUT: 0 mL / NET: 1097 mL      CAPILLARY BLOOD GLUCOSE      POCT Blood Glucose.: 202 mg/dL (09 Jul 2021 12:04)    ECG:    PHYSICAL EXAM:  GENERAL: NAD, awake, oriented, following commands.  HEENT: KATHY/ Atraumatic, Normocephalic  ENMT: No tonsillar erythema, exudates, or enlargement; Moist mucous membranes, No lesions  NECK: Supple, No JVD, no palpable masses.  CHEST/LUNG: CTA, decreased lung sounds on left. No wheezes or rhonchis. Chest tube in left hemithorax.  CVS: Regular rate and rhythm; No murmurs, rubs, or gallops  GI: Soft, Nontender, Nondistended; Bowel sounds present  NERVOUS SYSTEM:  Alert & Oriented X3  EXTREMITIES: + trace edema  LYMPH: No lymphadenopathy noted  SKIN: No rashes or lesions. left upper arm fistula. RT wrist tender erythematous mass/protuberance.  ENDOCRINOLOGY: No Thyromegaly  PSYCH: Appropriate    MEDICATIONS:  MEDICATIONS  (STANDING):  aMIOdarone    Tablet 200 milliGRAM(s) Oral daily  aspirin  chewable 81 milliGRAM(s) Oral daily  atorvastatin 40 milliGRAM(s) Oral at bedtime  ceFAZolin   IVPB 1000 milliGRAM(s) IV Intermittent every 24 hours  chlorhexidine 2% Cloths 1 Application(s) Topical daily  dextrose 40% Gel 15 Gram(s) Oral once  dextrose 5%. 1000 milliLiter(s) (50 mL/Hr) IV Continuous <Continuous>  dextrose 5%. 1000 milliLiter(s) (100 mL/Hr) IV Continuous <Continuous>  dextrose 50% Injectable 25 Gram(s) IV Push once  dextrose 50% Injectable 12.5 Gram(s) IV Push once  dextrose 50% Injectable 25 Gram(s) IV Push once  epoetin danilo-epbx (RETACRIT) Injectable 98669 Unit(s) IV Push <User Schedule>  glucagon  Injectable 1 milliGRAM(s) IntraMuscular once  heparin   Injectable 5000 Unit(s) SubCutaneous every 8 hours  insulin lispro (ADMELOG) corrective regimen sliding scale   SubCutaneous three times a day before meals  insulin lispro (ADMELOG) corrective regimen sliding scale   SubCutaneous at bedtime  metoprolol tartrate 25 milliGRAM(s) Oral two times a day  mupirocin 2% Ointment 1 Application(s) Topical two times a day  norepinephrine Infusion 0.25 MICROgram(s)/kG/Min (40.4 mL/Hr) IV Continuous <Continuous>  pantoprazole  Injectable 40 milliGRAM(s) IV Push every 12 hours  sodium chloride 0.9%. 1000 milliLiter(s) (50 mL/Hr) IV Continuous <Continuous>    MEDICATIONS  (PRN):  acetaminophen   Tablet .. 1000 milliGRAM(s) Oral every 6 hours PRN Temp greater or equal to 38C (100.4F), Mild Pain (1 - 3), Moderate Pain (4 - 6)  calamine/zinc oxide Lotion 1 Application(s) Topical three times a day PRN Itching      ALLERGIES:  Allergies    lisinopril (Other)  opioid-like analgesics (Other)    Intolerances        LABS:                        9.4    18.84 )-----------( 183      ( 09 Jul 2021 04:17 )             31.3     07-09    126<L>  |  87<L>  |  18  ----------------------------<  295<H>  4.0   |  17<L>  |  6.42<H>    Ca    8.3<L>      09 Jul 2021 04:17  Phos  4.8     07-09  Mg     2.00     07-09    TPro  6.0  /  Alb  3.2<L>  /  TBili  0.9  /  DBili  x   /  AST  31  /  ALT  10  /  AlkPhos  66  07-09  PT/INR - ( 08 Jul 2021 12:21 )   PT: 19.1 sec;   INR: 1.72 ratio     PTT - ( 08 Jul 2021 12:21 )  PTT:28.7 sec      RADIOLOGY & ADDITIONAL TESTS: Reviewed.

## 2021-07-10 LAB
ALBUMIN SERPL ELPH-MCNC: 3.3 G/DL — SIGNIFICANT CHANGE UP (ref 3.3–5)
ALP SERPL-CCNC: 77 U/L — SIGNIFICANT CHANGE UP (ref 40–120)
ALT FLD-CCNC: 21 U/L — SIGNIFICANT CHANGE UP (ref 4–41)
ANION GAP SERPL CALC-SCNC: 16 MMOL/L — HIGH (ref 7–14)
APTT BLD: 176.2 SEC — CRITICAL HIGH (ref 27–36.3)
AST SERPL-CCNC: 45 U/L — HIGH (ref 4–40)
BASE EXCESS BLDV CALC-SCNC: -0.4 MMOL/L — SIGNIFICANT CHANGE UP (ref -3–2)
BASOPHILS # BLD AUTO: 0.05 K/UL — SIGNIFICANT CHANGE UP (ref 0–0.2)
BASOPHILS NFR BLD AUTO: 0.3 % — SIGNIFICANT CHANGE UP (ref 0–2)
BILIRUB SERPL-MCNC: 0.7 MG/DL — SIGNIFICANT CHANGE UP (ref 0.2–1.2)
BLOOD GAS VENOUS - CREATININE: 6.1 MG/DL — HIGH (ref 0.5–1.3)
BLOOD GAS VENOUS COMPREHENSIVE RESULT: SIGNIFICANT CHANGE UP
BUN SERPL-MCNC: 16 MG/DL — SIGNIFICANT CHANGE UP (ref 7–23)
CALCIUM SERPL-MCNC: 8.8 MG/DL — SIGNIFICANT CHANGE UP (ref 8.4–10.5)
CHLORIDE BLDV-SCNC: 96 MMOL/L — SIGNIFICANT CHANGE UP (ref 96–108)
CHLORIDE SERPL-SCNC: 93 MMOL/L — LOW (ref 98–107)
CK MB BLD-MCNC: 22.7 % — HIGH (ref 0–2.5)
CK MB CFR SERPL CALC: 23.8 NG/ML — HIGH
CK MB CFR SERPL CALC: 34.5 NG/ML — HIGH
CK SERPL-CCNC: 105 U/L — SIGNIFICANT CHANGE UP (ref 30–200)
CK SERPL-CCNC: 190 U/L — SIGNIFICANT CHANGE UP (ref 30–200)
CO2 SERPL-SCNC: 23 MMOL/L — SIGNIFICANT CHANGE UP (ref 22–31)
CREAT SERPL-MCNC: 5.72 MG/DL — HIGH (ref 0.5–1.3)
EOSINOPHIL # BLD AUTO: 0.21 K/UL — SIGNIFICANT CHANGE UP (ref 0–0.5)
EOSINOPHIL NFR BLD AUTO: 1.2 % — SIGNIFICANT CHANGE UP (ref 0–6)
GAS PNL BLDV: 131 MMOL/L — LOW (ref 136–146)
GLUCOSE BLDC GLUCOMTR-MCNC: 178 MG/DL — HIGH (ref 70–99)
GLUCOSE BLDC GLUCOMTR-MCNC: 195 MG/DL — HIGH (ref 70–99)
GLUCOSE BLDC GLUCOMTR-MCNC: 218 MG/DL — HIGH (ref 70–99)
GLUCOSE BLDC GLUCOMTR-MCNC: 220 MG/DL — HIGH (ref 70–99)
GLUCOSE BLDV-MCNC: 170 MG/DL — HIGH (ref 70–99)
GLUCOSE SERPL-MCNC: 169 MG/DL — HIGH (ref 70–99)
GRAM STN FLD: SIGNIFICANT CHANGE UP
GRAM STN FLD: SIGNIFICANT CHANGE UP
HCO3 BLDV-SCNC: 22 MMOL/L — SIGNIFICANT CHANGE UP (ref 20–27)
HCT VFR BLD CALC: 31.6 % — LOW (ref 39–50)
HCT VFR BLDA CALC: 30.8 % — LOW (ref 39–51)
HGB BLD CALC-MCNC: 10 G/DL — LOW (ref 13–17)
HGB BLD-MCNC: 9.6 G/DL — LOW (ref 13–17)
IANC: 13.4 K/UL — HIGH (ref 1.5–8.5)
IMM GRANULOCYTES NFR BLD AUTO: 1.1 % — SIGNIFICANT CHANGE UP (ref 0–1.5)
LACTATE BLDV-MCNC: 2.1 MMOL/L — HIGH (ref 0.5–2)
LYMPHOCYTES # BLD AUTO: 0.94 K/UL — LOW (ref 1–3.3)
LYMPHOCYTES # BLD AUTO: 5.6 % — LOW (ref 13–44)
MAGNESIUM SERPL-MCNC: 1.9 MG/DL — SIGNIFICANT CHANGE UP (ref 1.6–2.6)
MCHC RBC-ENTMCNC: 28.8 PG — SIGNIFICANT CHANGE UP (ref 27–34)
MCHC RBC-ENTMCNC: 30.4 GM/DL — LOW (ref 32–36)
MCV RBC AUTO: 94.9 FL — SIGNIFICANT CHANGE UP (ref 80–100)
MONOCYTES # BLD AUTO: 2.12 K/UL — HIGH (ref 0–0.9)
MONOCYTES NFR BLD AUTO: 12.5 % — SIGNIFICANT CHANGE UP (ref 2–14)
NEUTROPHILS # BLD AUTO: 13.4 K/UL — HIGH (ref 1.8–7.4)
NEUTROPHILS NFR BLD AUTO: 79.3 % — HIGH (ref 43–77)
NRBC # BLD: 0 /100 WBCS — SIGNIFICANT CHANGE UP
NRBC # FLD: 0.14 K/UL — HIGH
PCO2 BLDV: 61 MMHG — HIGH (ref 41–51)
PH BLDV: 7.25 — LOW (ref 7.32–7.43)
PHOSPHATE SERPL-MCNC: 4.1 MG/DL — SIGNIFICANT CHANGE UP (ref 2.5–4.5)
PLATELET # BLD AUTO: 184 K/UL — SIGNIFICANT CHANGE UP (ref 150–400)
PO2 BLDV: 29 MMHG — LOW (ref 35–40)
POTASSIUM BLDV-SCNC: 3.8 MMOL/L — SIGNIFICANT CHANGE UP (ref 3.4–4.5)
POTASSIUM SERPL-MCNC: 3.9 MMOL/L — SIGNIFICANT CHANGE UP (ref 3.5–5.3)
POTASSIUM SERPL-SCNC: 3.9 MMOL/L — SIGNIFICANT CHANGE UP (ref 3.5–5.3)
PROT SERPL-MCNC: 6 G/DL — SIGNIFICANT CHANGE UP (ref 6–8.3)
RBC # BLD: 3.33 M/UL — LOW (ref 4.2–5.8)
RBC # FLD: 17.2 % — HIGH (ref 10.3–14.5)
SAO2 % BLDV: 43.7 % — LOW (ref 60–85)
SODIUM SERPL-SCNC: 132 MMOL/L — LOW (ref 135–145)
TROPONIN T, HIGH SENSITIVITY RESULT: 1806 NG/L — CRITICAL HIGH
TROPONIN T, HIGH SENSITIVITY RESULT: 1890 NG/L — CRITICAL HIGH
WBC # BLD: 16.9 K/UL — HIGH (ref 3.8–10.5)
WBC # FLD AUTO: 16.9 K/UL — HIGH (ref 3.8–10.5)

## 2021-07-10 PROCEDURE — 93971 EXTREMITY STUDY: CPT | Mod: 26,59

## 2021-07-10 PROCEDURE — 99291 CRITICAL CARE FIRST HOUR: CPT

## 2021-07-10 PROCEDURE — 93308 TTE F-UP OR LMTD: CPT | Mod: 26,GC

## 2021-07-10 PROCEDURE — 76604 US EXAM CHEST: CPT | Mod: 26

## 2021-07-10 PROCEDURE — 99233 SBSQ HOSP IP/OBS HIGH 50: CPT

## 2021-07-10 PROCEDURE — 71045 X-RAY EXAM CHEST 1 VIEW: CPT | Mod: 26

## 2021-07-10 PROCEDURE — 93970 EXTREMITY STUDY: CPT | Mod: 26,59

## 2021-07-10 RX ORDER — HEPARIN SODIUM 5000 [USP'U]/ML
INJECTION INTRAVENOUS; SUBCUTANEOUS
Qty: 25000 | Refills: 0 | Status: DISCONTINUED | OUTPATIENT
Start: 2021-07-10 | End: 2021-07-11

## 2021-07-10 RX ORDER — HEPARIN SODIUM 5000 [USP'U]/ML
7000 INJECTION INTRAVENOUS; SUBCUTANEOUS ONCE
Refills: 0 | Status: DISCONTINUED | OUTPATIENT
Start: 2021-07-10 | End: 2021-07-11

## 2021-07-10 RX ORDER — HEPARIN SODIUM 5000 [USP'U]/ML
3500 INJECTION INTRAVENOUS; SUBCUTANEOUS EVERY 6 HOURS
Refills: 0 | Status: DISCONTINUED | OUTPATIENT
Start: 2021-07-10 | End: 2021-07-11

## 2021-07-10 RX ORDER — HEPARIN SODIUM 5000 [USP'U]/ML
7000 INJECTION INTRAVENOUS; SUBCUTANEOUS EVERY 6 HOURS
Refills: 0 | Status: DISCONTINUED | OUTPATIENT
Start: 2021-07-10 | End: 2021-07-11

## 2021-07-10 RX ADMIN — Medication 1000 MILLIGRAM(S): at 11:12

## 2021-07-10 RX ADMIN — Medication 2: at 17:01

## 2021-07-10 RX ADMIN — Medication 1: at 07:55

## 2021-07-10 RX ADMIN — HEPARIN SODIUM 0 UNIT(S)/HR: 5000 INJECTION INTRAVENOUS; SUBCUTANEOUS at 23:17

## 2021-07-10 RX ADMIN — Medication 40.4 MICROGRAM(S)/KG/MIN: at 10:08

## 2021-07-10 RX ADMIN — CHLORHEXIDINE GLUCONATE 1 APPLICATION(S): 213 SOLUTION TOPICAL at 11:28

## 2021-07-10 RX ADMIN — ATORVASTATIN CALCIUM 40 MILLIGRAM(S): 80 TABLET, FILM COATED ORAL at 21:21

## 2021-07-10 RX ADMIN — Medication 1000 MILLIGRAM(S): at 10:34

## 2021-07-10 RX ADMIN — HEPARIN SODIUM 5000 UNIT(S): 5000 INJECTION INTRAVENOUS; SUBCUTANEOUS at 06:03

## 2021-07-10 RX ADMIN — Medication 2: at 11:28

## 2021-07-10 RX ADMIN — PANTOPRAZOLE SODIUM 40 MILLIGRAM(S): 20 TABLET, DELAYED RELEASE ORAL at 17:02

## 2021-07-10 RX ADMIN — MUPIROCIN 1 APPLICATION(S): 20 OINTMENT TOPICAL at 17:02

## 2021-07-10 RX ADMIN — PANTOPRAZOLE SODIUM 40 MILLIGRAM(S): 20 TABLET, DELAYED RELEASE ORAL at 06:02

## 2021-07-10 RX ADMIN — Medication 81 MILLIGRAM(S): at 11:28

## 2021-07-10 RX ADMIN — Medication 40.4 MICROGRAM(S)/KG/MIN: at 21:22

## 2021-07-10 RX ADMIN — Medication 100 MILLIGRAM(S): at 21:20

## 2021-07-10 RX ADMIN — HEPARIN SODIUM 1600 UNIT(S)/HR: 5000 INJECTION INTRAVENOUS; SUBCUTANEOUS at 10:08

## 2021-07-10 RX ADMIN — MUPIROCIN 1 APPLICATION(S): 20 OINTMENT TOPICAL at 06:03

## 2021-07-10 NOTE — CHART NOTE - NSCHARTNOTEFT_GEN_A_CORE
: Dario Bello    INDICATION: Hypoxemia    PROCEDURE:  [X] LIMITED ECHO   [X] LIMITED CHEST  [ ] LIMITED RETROPERITONEAL  [ ] LIMITED ABDOMINAL  [ ] LIMITED DVT  [ ] NEEDLE GUIDANCE VASCULAR  [ ] NEEDLE GUIDANCE THORACENTESIS  [ ] NEEDLE GUIDANCE PARACENTESIS  [ ] NEEDLE GUIDANCE PERICARDIOCENTESIS  [ ] OTHER    FINDINGS: A line predominant anteriorly. Small simple L pleural effusion. L basilar consolidation pattern. Grossly normal LV systolic function .LVOT VTI 16.8. RV larger than LV in size. TR noted with TR maxPG 61 mmhg      INTERPRETATION: Small simple L pleural effusion. L base consolidation vs atelectasis. Evidence of elevated RV pressure.    Images stored on [employer].    Henri Bello MD  Pulmonary & Critical Care Fellow  [phone]

## 2021-07-10 NOTE — PROGRESS NOTE ADULT - SUBJECTIVE AND OBJECTIVE BOX
EP ATTENDING    tele: typical AFL with PVCs, HR 60s, no evidence of heart block  he denies palpitations, syncope, nor angina, ROS otherwise -  resting comfortably.    Review of Systems:   Constitutional: [ ] fevers, [ ] chills.   Skin: [ ] dry skin. [ ] rashes.  Psychiatric: [ ] depression, [ ] anxiety.   Gastrointestinal: [ ] BRBPR, [ ] melena.   Neurological: [ ] confusion. [ ] seizures. [ ] shuffling gait.   Ears,Nose,Mouth and Throat: [ ] ear pain [ ] sore throat.   Eyes: [ ] diplopia.   Respiratory: [ ] hemoptysis. [ ] shortness of breath  Cardiovascular: See HPI above  Hematologic/Lymphatic: [ ] anemia. [ ] painful nodes. [ ] prolonged bleeding.   Genitourinary: [ ] hematuria. [ ] flank pain.   Endocrine: [ ] significant change in weight. [ ] intolerance to heat and cold.     Review of systems [ x] otherwise negative, [ ] otherwise unable to obtain    FH: no family history of sudden cardiac death in first degree relatives    SH: [ ] tobacco, [ ] alcohol, [ ] drugs    acetaminophen   Tablet .. 1000 milliGRAM(s) Oral every 6 hours PRN  aspirin  chewable 81 milliGRAM(s) Oral daily  atorvastatin 40 milliGRAM(s) Oral at bedtime  calamine/zinc oxide Lotion 1 Application(s) Topical three times a day PRN  ceFAZolin   IVPB 1000 milliGRAM(s) IV Intermittent every 24 hours  chlorhexidine 2% Cloths 1 Application(s) Topical daily  dextrose 40% Gel 15 Gram(s) Oral once  dextrose 5%. 1000 milliLiter(s) IV Continuous <Continuous>  dextrose 5%. 1000 milliLiter(s) IV Continuous <Continuous>  dextrose 50% Injectable 25 Gram(s) IV Push once  dextrose 50% Injectable 12.5 Gram(s) IV Push once  dextrose 50% Injectable 25 Gram(s) IV Push once  epoetin danilo-epbx (RETACRIT) Injectable 00759 Unit(s) IV Push <User Schedule>  glucagon  Injectable 1 milliGRAM(s) IntraMuscular once  heparin   Injectable 7000 Unit(s) IV Push once  heparin   Injectable 7000 Unit(s) IV Push every 6 hours PRN  heparin   Injectable 3500 Unit(s) IV Push every 6 hours PRN  heparin  Infusion.  Unit(s)/Hr IV Continuous <Continuous>  insulin lispro (ADMELOG) corrective regimen sliding scale   SubCutaneous three times a day before meals  insulin lispro (ADMELOG) corrective regimen sliding scale   SubCutaneous at bedtime  metoprolol tartrate 25 milliGRAM(s) Oral two times a day  mupirocin 2% Ointment 1 Application(s) Topical two times a day  norepinephrine Infusion 0.25 MICROgram(s)/kG/Min IV Continuous <Continuous>  pantoprazole  Injectable 40 milliGRAM(s) IV Push every 12 hours  sodium chloride 0.9%. 1000 milliLiter(s) IV Continuous <Continuous>                            9.6    16.90 )-----------( 184      ( 10 Jul 2021 03:13 )             31.6       07-10    132<L>  |  93<L>  |  16  ----------------------------<  169<H>  3.9   |  23  |  5.72<H>    Ca    8.8      10 Jul 2021 03:13  Phos  4.1     07-10  Mg     1.90     07-10    TPro  6.0  /  Alb  3.3  /  TBili  0.7  /  DBili  x   /  AST  45<H>  /  ALT  21  /  AlkPhos  77  07-10      CARDIAC MARKERS ( 10 Jul 2021 03:13 )  x     / x     / 190 U/L / x     / 34.5 ng/mL  CARDIAC MARKERS ( 09 Jul 2021 19:22 )  x     / x     / 221 U/L / x     / x      CARDIAC MARKERS ( 09 Jul 2021 19:19 )  x     / x     / x     / x     / 37.2 ng/mL  CARDIAC MARKERS ( 09 Jul 2021 13:04 )  x     / x     / x     / x     / 44.8 ng/mL  CARDIAC MARKERS ( 08 Jul 2021 12:32 )  x     / x     / 49 U/L / x     / x          T(C): 36.6 (07-10-21 @ 12:00), Max: 37.3 (07-09-21 @ 16:00)  HR: 65 (07-10-21 @ 12:00) (54 - 79)  BP: 107/72 (07-10-21 @ 12:00) (56/41 - 164/72)  RR: 17 (07-10-21 @ 12:00) (17 - 29)  SpO2: 99% (07-10-21 @ 12:00) (93% - 100%)  Wt(kg): --    I&O's Summary    09 Jul 2021 07:01  -  10 Jul 2021 07:00  --------------------------------------------------------  IN: 2022.4 mL / OUT: 1340 mL / NET: 682.4 mL    10 Jul 2021 07:01  -  10 Jul 2021 14:16  --------------------------------------------------------  IN: 183.9 mL / OUT: 100 mL / NET: 83.9 mL    General: Well nourished in no acute distress. Alert and Oriented * 3.   Head: Normocephalic and atraumatic.   Neck: No JVD. No bruits. Supple. Does not appear to be enlarged.   Cardiovascular: + S1,S2 ; RRR Soft systolic murmur at the left lower sternal border. No rubs noted.    Lungs: CTA b/l. No rhonchi, rales or wheezes.   Abdomen: + BS, soft. Non tender. Non distended. No rebound. No guarding.   Extremities: No clubbing/cyanosis/edema.   Neurologic: Moves all four extremities. Full range of motion.   Skin: Warm and moist. The patient's skin has normal elasticity and good skin turgor.   Psychiatric: Appropriate mood and affect.  Musculoskeletal: Normal range of motion, normal strength      A/P) 71 y/o male PMH CAD s/p CABG, PAF/AFL on coumadin, HTN, hyperlipidemia, DM, ESRD on HD, and hypothyroidism a/w sepsis    -continue metoprolol for rate control  -continue coumadin for stroke prevention if no contraindication (f/u GI)  -no further inpatient EP workup needed    -supportive are as per MICU- NorEpi (0.16) for septic shock, MAP ~65-70mmHg at present time  -f/u cardiology  -no indication for PPM at this time    Pierce Nguyen M.D.  Cardiac Electrophysiology  [phone]

## 2021-07-10 NOTE — PROGRESS NOTE ADULT - ATTENDING COMMENTS
71 yo M with PMhx of CAD s/p CABG (course at that time complicated by pleural effusion requiring chest tubes), ESRD on HD (MWF), DMII, HTN, afib on coumadin, and anemia transferred from [facility] for evaluation of chest pain after HD and GIB s/p 2U PRBC transfusion and aflutter/afib with RVR managed with metoprolol. Admitted to MICU for hypotension dependent on pressors likely 2/2 to septic shock. staph bacteremia, persistently positive  blood cultures, continue cefazolin/ID f/u.. f/u CT output, >500 cc over 24 hours, continue drainage, transudative. was loculated and pt with thoracenteses in the past. pressor support. midodrine stopped due to bradycardia.

## 2021-07-10 NOTE — PROGRESS NOTE ADULT - SUBJECTIVE AND OBJECTIVE BOX
[facility] - S Eugene / Marlena S /D Cruz/ S Narendra/ S Colleen/ Alexandre Esposito / WILMER Hermosillou/ O Jessi  service -[phone], office [phone]  ---------------------------------------------------------------------------------------------------------------    Patient seen and examined bedside in MICU. pt drowsy, arousable to verbal stimuli.     Subjective and Objective: No overnight events    Allergies: lisinopril (Other)  opioid-like analgesics (Other)      Hospital Medications:   MEDICATIONS  (STANDING):  aspirin  chewable 81 milliGRAM(s) Oral daily  atorvastatin 40 milliGRAM(s) Oral at bedtime  ceFAZolin   IVPB 1000 milliGRAM(s) IV Intermittent every 24 hours  chlorhexidine 2% Cloths 1 Application(s) Topical daily  dextrose 40% Gel 15 Gram(s) Oral once  dextrose 5%. 1000 milliLiter(s) (50 mL/Hr) IV Continuous <Continuous>  dextrose 5%. 1000 milliLiter(s) (100 mL/Hr) IV Continuous <Continuous>  dextrose 50% Injectable 25 Gram(s) IV Push once  dextrose 50% Injectable 12.5 Gram(s) IV Push once  dextrose 50% Injectable 25 Gram(s) IV Push once  epoetin danilo-epbx (RETACRIT) Injectable 95075 Unit(s) IV Push <User Schedule>  glucagon  Injectable 1 milliGRAM(s) IntraMuscular once  heparin   Injectable 7000 Unit(s) IV Push once  heparin  Infusion.  Unit(s)/Hr (16 mL/Hr) IV Continuous <Continuous>  insulin lispro (ADMELOG) corrective regimen sliding scale   SubCutaneous three times a day before meals  insulin lispro (ADMELOG) corrective regimen sliding scale   SubCutaneous at bedtime  metoprolol tartrate 25 milliGRAM(s) Oral two times a day  mupirocin 2% Ointment 1 Application(s) Topical two times a day  norepinephrine Infusion 0.25 MICROgram(s)/kG/Min (40.4 mL/Hr) IV Continuous <Continuous>  pantoprazole  Injectable 40 milliGRAM(s) IV Push every 12 hours  sodium chloride 0.9%. 1000 milliLiter(s) (50 mL/Hr) IV Continuous <Continuous>      REVIEW OF SYSTEMS:  unable to obtain from pt    VITALS:  T(F): 97.9 (07-10-21 @ 12:00), Max: 99.1 (07-09-21 @ 17:30)  HR: 64 (07-10-21 @ 16:00)  BP: 132/56 (07-10-21 @ 16:00)  RR: 18 (07-10-21 @ 16:00)  SpO2: 95% (07-10-21 @ 16:00)  Wt(kg): --    07-09 @ 07:01  -  07-10 @ 07:00  --------------------------------------------------------  IN: 2022.4 mL / OUT: 1340 mL / NET: 682.4 mL    07-10 @ 07:01  -  07-10 @ 16:17  --------------------------------------------------------  IN: 351.5 mL / OUT: 150 mL / NET: 201.5 mL      PHYSICAL EXAM:  Constitutional: NAD  HEENT: anicteric sclera  Neck: No JVD  Respiratory: CTAB, no wheezes, rales or rhonchi. +chest tube  Cardiovascular: S1, S2, RRR  Gastrointestinal: BS+, soft, NT/ND  Extremities: trace peripheral edema  Neurological: drowsy, arousable, not very alert  Psychiatric: Normal mood, normal affect  : No wagner.   Vascular Access: WES avf+thrill     LABS:  07-10    132<L>  |  93<L>  |  16  ----------------------------<  169<H>  3.9   |  23  |  5.72<H>    Ca    8.8      10 Jul 2021 03:13  Phos  4.1     07-10  Mg     1.90     07-10    TPro  6.0  /  Alb  3.3  /  TBili  0.7  /  DBili      /  AST  45<H>  /  ALT  21  /  AlkPhos  77  07-10    Creatinine Trend: 5.72 <--, 6.42 <--, 6.10 <--, TNP <--, 4.65 <--, 7.48 <--, 5.12 <--, 5.01 <--, 4.25 <--                        9.6    16.90 )-----------( 184      ( 10 Jul 2021 03:13 )             31.6     Urine Studies:        RADIOLOGY & ADDITIONAL STUDIES:

## 2021-07-10 NOTE — PROGRESS NOTE ADULT - ASSESSMENT
71 yo M with PMhx of CAD s/p CABG (course at that time complicated by pleural effusion requiring chest tubes), ESRD on HD (MWF), DMII, HTN, afib on coumadin, and anemia presented from [facility] for evaluation of chest pain and GIB. Renal following for ESRD Mx.    ESRD  Maintenance schedule MWF  K, vol-ok  hyponatremia, M acidosis noted  currently bp stable, on iv pressor.   s/p bedside HD 7/9, rx sheet reviewed, no net uf removal, low bath temp. uneventful  no indication for HD today  plan for next routine HD next Monday  Renal diet  dose all meds for ESRD  h/o HTN, now hypotensive/sepsis, on norepinephrine drip. f/u cxs. Mx per ICU  On abx, f/u BCx. s/p L plueral pigtail catheter. on Cefazolin, renally dosed. abxs per ID  d/c metoprolol     Anemia in CKD+GIB- Hb <goal  c/w Epo 20k tiw with HD  monitor h/h, neg FOBT  GI following, No plans for cscope.     Renal osteodystrophy  Phos at goal    labs, chart reviewed    [facility]  Cell - [phone]  Office [phone]  Ans Serv [phone]

## 2021-07-10 NOTE — PROGRESS NOTE ADULT - ASSESSMENT
69 yo M with PMhx of CAD s/p CABG (course at that time complicated by pleural effusion requiring chest tubes), ESRD on HD (MW), DMII, HTN, afib on coumadin, and anemia transferred from [facility] for evaluation of chest pain after HD and GIB s/p 2U PRBC transfusion and aflutter/afib with RVR managed with metoprolol. Admitted to MICU for hypotension dependent on pressors likely 2/2 to sepsis.    #Neuro  - no active issues.    #Cardiovascular  - Hypotension: MICU was consulted one day prior to transfer for hypotension responsive to Midodrine and 500cc IV bolus. Today he presented bradycardia and worsening hypotension after placement of chest tube. He was given atropine, Chris and Levophed.  - TTE on 7/7: Right ventricular enlargement with decreased right ventricular systolic function. Inadequate tricuspid regurgitation Doppler envelope precludes estimation of RVSP.    - c/w Levophed wean as tolerated    - monitor HR and BP    - d/c Midodrine    - Troponins on increasing trend, latest at 1,806, CKMB 34.5. EKG with variable baseline in the setting of baseline aflutter. His cardiology service was contacted and they are aware of the condition. Likely 2/2 chronic condition/ESRD on HD vs Hypotensive event. Will continue to trend. Patient currently asx.    #Respiratory  - Loculated Pleural effusions: Chest tube on left hemithorax draining serosanguineous fluid. Currently unclamped.   - pleural fluid cx: (-)   - Pleural fluid: transudative.   - currently saturating adequately on NC @ 5L. Wean as tolerated.  - f/u w/ CT surgery for possible chest tube removal as effusion is transudative and cultures were neg.       #GI/Nutrition:  - Diet: clear liquids, advance as tolerated   - C/o abdominal pain, bedside POCUS didn't show ascites. Continue to monitor.       #/Renal  - ESRD: HD on MWF. Most recent Scr 6.10 an increase from the previous labs. Potassium is stable.   - HD yesterday. No volumen was eliminated. Will reasses patient and consider another session today for volumen removal.      #Skin  - IV Line: new lines today as pt took lines off during the night.  - HD Access: fistula in left upper arm  - Improving erythematous and tender on right wrist. Continue to monitor. Will get wrist U/S.      #ID  - S aureus PCR: (+),  MRSA (-) c/w Mupirocin  - BCx: MSSA bacteremia. Started on Cefazolin 1g QD after HD.   - d/c Zosyn as pt w/ MSSA.  - Serial blood Cx q48h until neg.      #Endocrine  - DMII: c/w Lispro SS. Monitor glucose.      #Hematologic/DVT ppx  - Heparin SQ q8h  - Discuss w/ GI regarding when its ok to start full A/C for afib      #Ethics  - full code as per wife

## 2021-07-10 NOTE — CHART NOTE - NSCHARTNOTEFT_GEN_A_CORE
pt in MICU for hypotension, supported on levophed  99% on 4L nasal cannula  left sided chest tube output 540cc/24h  Recommend continuing chest tube until output is less than 200cc in 24h  continue care per primary MICU team    Francisca CERVANTES [id number]

## 2021-07-10 NOTE — PROGRESS NOTE ADULT - ASSESSMENT
70 year old with ESRD, DM, CAD presented with anemia and chest pain    Now with fever and hypotension- developed during hospitalization/ not present at admission.      Newly diagnosed MSSA bacteremia    1) MSSA bacteremia  Continue Cefazolin  Repeat blood cultures without growth  Check echo    Consider imaging LS spine  Monitor right wrist- "? phlebitis as focus    2) Fever  Likely due to bacteremia  Monitor    3) Pleural effusion  S/p thoracentesis- appears transudative but follow up Culture      Dw ICU   70 year old with ESRD, DM, CAD presented with anemia and chest pain    Now with fever and hypotension- developed during hospitalization/ not present at admission.      Newly diagnosed MSSA bacteremia    1) MSSA bacteremia  Continue Cefazolin  Repeat blood cultures 7/9 with growth in 1 of 4 bottles  Check echo    Consider imaging LS spine  Monitor right wrist- "? phlebitis as focus    Repeat blood culture on 7/11    2) Fever  Likely due to bacteremia  Monitor    3) Pleural effusion  S/p thoracentesis- appears transudative but follow up Culture      Dw ICU

## 2021-07-10 NOTE — PROGRESS NOTE ADULT - SUBJECTIVE AND OBJECTIVE BOX
INTERVAL HPI/OVERNIGHT EVENTS:    SUBJECTIVE: Patient seen and examined at bedside.       VITAL SIGNS:  ICU Vital Signs Last 24 Hrs  T(C): 36.6 (10 Jul 2021 12:00), Max: 37.3 (09 Jul 2021 16:00)  T(F): 97.9 (10 Jul 2021 12:00), Max: 99.1 (09 Jul 2021 16:00)  HR: 65 (10 Jul 2021 12:00) (54 - 79)  BP: 107/72 (10 Jul 2021 12:00) (56/41 - 164/72)  BP(mean): 78 (10 Jul 2021 12:00) (39 - 101)  ABP: --  ABP(mean): --  RR: 17 (10 Jul 2021 12:00) (17 - 29)  SpO2: 99% (10 Jul 2021 12:00) (93% - 100%)      Plateau pressure:   P/F ratio:     07-09 @ 07:01  -  07-10 @ 07:00  --------------------------------------------------------  IN: 2022.4 mL / OUT: 1340 mL / NET: 682.4 mL    07-10 @ 07:01  -  07-10 @ 12:24  --------------------------------------------------------  IN: 183.9 mL / OUT: 100 mL / NET: 83.9 mL      CAPILLARY BLOOD GLUCOSE      POCT Blood Glucose.: 220 mg/dL (10 Jul 2021 11:25)    ECG:    PHYSICAL EXAM:  GENERAL: NAD, awake, oriented, following commands.  HEENT: KATHY/ Atraumatic, Normocephalic  ENMT: No tonsillar erythema, exudates, or enlargement; Moist mucous membranes, No lesions  NECK: Supple, No JVD, no palpable masses.  CHEST/LUNG: CTA, decreased lung sounds on left. No wheezes or rhonchis. Chest tube in left hemithorax.  CVS: Regular rate and rhythm; No murmurs, rubs, or gallops  GI: Soft, Nontender, Nondistended; Bowel sounds present  NERVOUS SYSTEM:  Alert & Oriented X3  EXTREMITIES: + trace edema  LYMPH: No lymphadenopathy noted  SKIN: No rashes or lesions. left upper arm fistula. RT wrist tender erythematous mass/protuberance.  ENDOCRINOLOGY: No Thyromegaly  PSYCH: Appropriate      MEDICATIONS:  MEDICATIONS  (STANDING):  aspirin  chewable 81 milliGRAM(s) Oral daily  atorvastatin 40 milliGRAM(s) Oral at bedtime  ceFAZolin   IVPB 1000 milliGRAM(s) IV Intermittent every 24 hours  chlorhexidine 2% Cloths 1 Application(s) Topical daily  dextrose 40% Gel 15 Gram(s) Oral once  dextrose 5%. 1000 milliLiter(s) (50 mL/Hr) IV Continuous <Continuous>  dextrose 5%. 1000 milliLiter(s) (100 mL/Hr) IV Continuous <Continuous>  dextrose 50% Injectable 25 Gram(s) IV Push once  dextrose 50% Injectable 12.5 Gram(s) IV Push once  dextrose 50% Injectable 25 Gram(s) IV Push once  epoetin dnailo-epbx (RETACRIT) Injectable 87687 Unit(s) IV Push <User Schedule>  glucagon  Injectable 1 milliGRAM(s) IntraMuscular once  heparin   Injectable 7000 Unit(s) IV Push once  heparin  Infusion.  Unit(s)/Hr (16 mL/Hr) IV Continuous <Continuous>  insulin lispro (ADMELOG) corrective regimen sliding scale   SubCutaneous three times a day before meals  insulin lispro (ADMELOG) corrective regimen sliding scale   SubCutaneous at bedtime  metoprolol tartrate 25 milliGRAM(s) Oral two times a day  mupirocin 2% Ointment 1 Application(s) Topical two times a day  norepinephrine Infusion 0.25 MICROgram(s)/kG/Min (40.4 mL/Hr) IV Continuous <Continuous>  pantoprazole  Injectable 40 milliGRAM(s) IV Push every 12 hours  sodium chloride 0.9%. 1000 milliLiter(s) (50 mL/Hr) IV Continuous <Continuous>    MEDICATIONS  (PRN):  acetaminophen   Tablet .. 1000 milliGRAM(s) Oral every 6 hours PRN Temp greater or equal to 38C (100.4F), Mild Pain (1 - 3), Moderate Pain (4 - 6)  calamine/zinc oxide Lotion 1 Application(s) Topical three times a day PRN Itching  heparin   Injectable 7000 Unit(s) IV Push every 6 hours PRN For aPTT less than 40  heparin   Injectable 3500 Unit(s) IV Push every 6 hours PRN For aPTT between 40 - 57      ALLERGIES:  Allergies    lisinopril (Other)  opioid-like analgesics (Other)    Intolerances        LABS:                        9.6    16.90 )-----------( 184      ( 10 Jul 2021 03:13 )             31.6     07-10    132<L>  |  93<L>  |  16  ----------------------------<  169<H>  3.9   |  23  |  5.72<H>    Ca    8.8      10 Jul 2021 03:13  Phos  4.1     07-10  Mg     1.90     07-10    TPro  6.0  /  Alb  3.3  /  TBili  0.7  /  DBili  x   /  AST  45<H>  /  ALT  21  /  AlkPhos  77  07-10          RADIOLOGY & ADDITIONAL TESTS: Reviewed.

## 2021-07-10 NOTE — PROGRESS NOTE ADULT - SUBJECTIVE AND OBJECTIVE BOX
Follow Up:      Inverval History/ROS:Patient is a 70y old  Male who presents with a chief complaint of Chest pain and GIB (10 Jul 2021 12:23)    More alert  No fever    Allergies    lisinopril (Other)  opioid-like analgesics (Other)    Intolerances        ANTIMICROBIALS:  ceFAZolin   IVPB 1000 every 24 hours      OTHER MEDS:  acetaminophen   Tablet .. 1000 milliGRAM(s) Oral every 6 hours PRN  aspirin  chewable 81 milliGRAM(s) Oral daily  atorvastatin 40 milliGRAM(s) Oral at bedtime  calamine/zinc oxide Lotion 1 Application(s) Topical three times a day PRN  chlorhexidine 2% Cloths 1 Application(s) Topical daily  dextrose 40% Gel 15 Gram(s) Oral once  dextrose 5%. 1000 milliLiter(s) IV Continuous <Continuous>  dextrose 5%. 1000 milliLiter(s) IV Continuous <Continuous>  dextrose 50% Injectable 25 Gram(s) IV Push once  dextrose 50% Injectable 12.5 Gram(s) IV Push once  dextrose 50% Injectable 25 Gram(s) IV Push once  epoetin danilo-epbx (RETACRIT) Injectable 76654 Unit(s) IV Push <User Schedule>  glucagon  Injectable 1 milliGRAM(s) IntraMuscular once  heparin   Injectable 7000 Unit(s) IV Push once  heparin   Injectable 7000 Unit(s) IV Push every 6 hours PRN  heparin   Injectable 3500 Unit(s) IV Push every 6 hours PRN  heparin  Infusion.  Unit(s)/Hr IV Continuous <Continuous>  insulin lispro (ADMELOG) corrective regimen sliding scale   SubCutaneous three times a day before meals  insulin lispro (ADMELOG) corrective regimen sliding scale   SubCutaneous at bedtime  metoprolol tartrate 25 milliGRAM(s) Oral two times a day  mupirocin 2% Ointment 1 Application(s) Topical two times a day  norepinephrine Infusion 0.25 MICROgram(s)/kG/Min IV Continuous <Continuous>  pantoprazole  Injectable 40 milliGRAM(s) IV Push every 12 hours  sodium chloride 0.9%. 1000 milliLiter(s) IV Continuous <Continuous>      Vital Signs Last 24 Hrs  T(C): 36.6 (10 Jul 2021 12:00), Max: 37.3 (09 Jul 2021 16:00)  T(F): 97.9 (10 Jul 2021 12:00), Max: 99.1 (09 Jul 2021 16:00)  HR: 65 (10 Jul 2021 12:00) (54 - 79)  BP: 107/72 (10 Jul 2021 12:00) (56/41 - 164/72)  BP(mean): 78 (10 Jul 2021 12:00) (39 - 101)  RR: 17 (10 Jul 2021 12:00) (17 - 29)  SpO2: 99% (10 Jul 2021 12:00) (93% - 100%)    PHYSICAL EXAM:  General: [ x] non-toxic  HEAD/EYES: [ ] PERRL [x ] white sclera [ ] icterus  ENT:  [ ] normal [x ] supple [ ] thrush [ ] pharyngeal exudate  Cardiovascular:   [ ] murmur [x ] normal [ ] PPM/AICD  Respiratory:  [x ] clear to ausculation bilaterally  GI:  [ x] soft, non-tender, normal bowel sounds  :  [ ] wagner [ ] no CVA tenderness   Musculoskeletal:  [x ]   ? induration proximal to right wrist vs phlebitis  Neurologic:  [ x] non-focal exam   Skin:  [ x] no rash  Lymph: [ ] no lymphadenopathy  Psychiatric:  [ ] appropriate affect [x ] alert & oriented  Lines:  [ x]? right wrist phlebitis [ ] central line                                9.6    16.90 )-----------( 184      ( 10 Jul 2021 03:13 )             31.6       07-10    132<L>  |  93<L>  |  16  ----------------------------<  169<H>  3.9   |  23  |  5.72<H>    Ca    8.8      10 Jul 2021 03:13  Phos  4.1     07-10  Mg     1.90     07-10    TPro  6.0  /  Alb  3.3  /  TBili  0.7  /  DBili  x   /  AST  45<H>  /  ALT  21  /  AlkPhos  77  07-10          MICROBIOLOGY:Culture Results:   No growth to date. (07-09-21 @ 08:17)  Culture Results:   Growth in aerobic bottle: Gram Positive Cocci in Clusters (07-09-21 @ 08:17)  Culture Results:   No growth (07-08-21 @ 20:48)  Culture Results:   Growth in aerobic and anaerobic bottles: Staphylococcus aureus  ***Blood Panel PCR results on this specimen are available  approximately 3 hours after the Gram stain result.***  Gram stain, PCR, and/or culture results may not always  correspond dueto difference in methodologies.  ************************************************************  This PCR assay was performed by multiplex PCR. This  Assay tests for 66 bacterial and resistance gene targets.  Please refer to the [facility] test directory  at [email or web address] for details. (07-07-21 @ 23:06)  Culture Results:   Growth in aerobic and anaerobic bottles: Staphylococcus aureus  See previous culture [id number] (07-07-21 @ 23:05)      RADIOLOGY:

## 2021-07-11 LAB
-  AMOXICILLIN/CLAVULANIC ACID: SIGNIFICANT CHANGE UP
-  AMPICILLIN/SULBACTAM: SIGNIFICANT CHANGE UP
-  AMPICILLIN: SIGNIFICANT CHANGE UP
-  CEFAZOLIN: SIGNIFICANT CHANGE UP
-  CEFTRIAXONE: SIGNIFICANT CHANGE UP
-  CIPROFLOXACIN: SIGNIFICANT CHANGE UP
-  CLINDAMYCIN: SIGNIFICANT CHANGE UP
-  DAPTOMYCIN: SIGNIFICANT CHANGE UP
-  ERYTHROMYCIN: SIGNIFICANT CHANGE UP
-  GENTAMICIN: SIGNIFICANT CHANGE UP
-  LEVOFLOXACIN: SIGNIFICANT CHANGE UP
-  LINEZOLID: SIGNIFICANT CHANGE UP
-  MEROPENEM: SIGNIFICANT CHANGE UP
-  MOXIFLOXACIN(AEROBIC): SIGNIFICANT CHANGE UP
-  OXACILLIN: SIGNIFICANT CHANGE UP
-  PENICILLIN: SIGNIFICANT CHANGE UP
-  RIFAMPIN: SIGNIFICANT CHANGE UP
-  TETRACYCLINE: SIGNIFICANT CHANGE UP
-  TRIMETHOPRIM/SULFAMETHOXAZOLE: SIGNIFICANT CHANGE UP
-  VANCOMYCIN: SIGNIFICANT CHANGE UP
ALBUMIN SERPL ELPH-MCNC: 2.9 G/DL — LOW (ref 3.3–5)
ALBUMIN SERPL ELPH-MCNC: 3 G/DL — LOW (ref 3.3–5)
ALP SERPL-CCNC: 88 U/L — SIGNIFICANT CHANGE UP (ref 40–120)
ALP SERPL-CCNC: 88 U/L — SIGNIFICANT CHANGE UP (ref 40–120)
ALT FLD-CCNC: 14 U/L — SIGNIFICANT CHANGE UP (ref 4–41)
ALT FLD-CCNC: 9 U/L — SIGNIFICANT CHANGE UP (ref 4–41)
ANION GAP SERPL CALC-SCNC: 14 MMOL/L — SIGNIFICANT CHANGE UP (ref 7–14)
ANION GAP SERPL CALC-SCNC: 16 MMOL/L — HIGH (ref 7–14)
APTT BLD: 116.8 SEC — HIGH (ref 27–36.3)
APTT BLD: 41.9 SEC — HIGH (ref 27–36.3)
AST SERPL-CCNC: 25 U/L — SIGNIFICANT CHANGE UP (ref 4–40)
AST SERPL-CCNC: 30 U/L — SIGNIFICANT CHANGE UP (ref 4–40)
BASOPHILS # BLD AUTO: 0.13 K/UL — SIGNIFICANT CHANGE UP (ref 0–0.2)
BASOPHILS # BLD AUTO: 0.14 K/UL — SIGNIFICANT CHANGE UP (ref 0–0.2)
BASOPHILS NFR BLD AUTO: 0.8 % — SIGNIFICANT CHANGE UP (ref 0–2)
BASOPHILS NFR BLD AUTO: 0.8 % — SIGNIFICANT CHANGE UP (ref 0–2)
BILIRUB SERPL-MCNC: 0.4 MG/DL — SIGNIFICANT CHANGE UP (ref 0.2–1.2)
BILIRUB SERPL-MCNC: 0.5 MG/DL — SIGNIFICANT CHANGE UP (ref 0.2–1.2)
BUN SERPL-MCNC: 28 MG/DL — HIGH (ref 7–23)
BUN SERPL-MCNC: 32 MG/DL — HIGH (ref 7–23)
CALCIUM SERPL-MCNC: 8.4 MG/DL — SIGNIFICANT CHANGE UP (ref 8.4–10.5)
CALCIUM SERPL-MCNC: 8.6 MG/DL — SIGNIFICANT CHANGE UP (ref 8.4–10.5)
CHLORIDE SERPL-SCNC: 91 MMOL/L — LOW (ref 98–107)
CHLORIDE SERPL-SCNC: 92 MMOL/L — LOW (ref 98–107)
CK MB CFR SERPL CALC: 18.1 NG/ML — HIGH
CO2 SERPL-SCNC: 20 MMOL/L — LOW (ref 22–31)
CO2 SERPL-SCNC: 20 MMOL/L — LOW (ref 22–31)
CREAT SERPL-MCNC: 7.83 MG/DL — HIGH (ref 0.5–1.3)
CREAT SERPL-MCNC: 8.56 MG/DL — HIGH (ref 0.5–1.3)
CULTURE RESULTS: SIGNIFICANT CHANGE UP
EOSINOPHIL # BLD AUTO: 0.28 K/UL — SIGNIFICANT CHANGE UP (ref 0–0.5)
EOSINOPHIL # BLD AUTO: 0.49 K/UL — SIGNIFICANT CHANGE UP (ref 0–0.5)
EOSINOPHIL NFR BLD AUTO: 1.8 % — SIGNIFICANT CHANGE UP (ref 0–6)
EOSINOPHIL NFR BLD AUTO: 2.7 % — SIGNIFICANT CHANGE UP (ref 0–6)
GLUCOSE BLDC GLUCOMTR-MCNC: 167 MG/DL — HIGH (ref 70–99)
GLUCOSE BLDC GLUCOMTR-MCNC: 192 MG/DL — HIGH (ref 70–99)
GLUCOSE BLDC GLUCOMTR-MCNC: 196 MG/DL — HIGH (ref 70–99)
GLUCOSE BLDC GLUCOMTR-MCNC: 198 MG/DL — HIGH (ref 70–99)
GLUCOSE SERPL-MCNC: 179 MG/DL — HIGH (ref 70–99)
GLUCOSE SERPL-MCNC: 235 MG/DL — HIGH (ref 70–99)
HCT VFR BLD CALC: 30.2 % — LOW (ref 39–50)
HCT VFR BLD CALC: 31.8 % — LOW (ref 39–50)
HGB BLD-MCNC: 9.1 G/DL — LOW (ref 13–17)
HGB BLD-MCNC: 9.5 G/DL — LOW (ref 13–17)
IANC: 11.58 K/UL — HIGH (ref 1.5–8.5)
IANC: 13.19 K/UL — HIGH (ref 1.5–8.5)
IMM GRANULOCYTES NFR BLD AUTO: 3 % — HIGH (ref 0–1.5)
IMM GRANULOCYTES NFR BLD AUTO: 3.5 % — HIGH (ref 0–1.5)
LYMPHOCYTES # BLD AUTO: 1.31 K/UL — SIGNIFICANT CHANGE UP (ref 1–3.3)
LYMPHOCYTES # BLD AUTO: 1.5 K/UL — SIGNIFICANT CHANGE UP (ref 1–3.3)
LYMPHOCYTES # BLD AUTO: 8.1 % — LOW (ref 13–44)
LYMPHOCYTES # BLD AUTO: 8.4 % — LOW (ref 13–44)
MAGNESIUM SERPL-MCNC: 1.8 MG/DL — SIGNIFICANT CHANGE UP (ref 1.6–2.6)
MCHC RBC-ENTMCNC: 28.4 PG — SIGNIFICANT CHANGE UP (ref 27–34)
MCHC RBC-ENTMCNC: 28.6 PG — SIGNIFICANT CHANGE UP (ref 27–34)
MCHC RBC-ENTMCNC: 29.9 GM/DL — LOW (ref 32–36)
MCHC RBC-ENTMCNC: 30.1 GM/DL — LOW (ref 32–36)
MCV RBC AUTO: 94.4 FL — SIGNIFICANT CHANGE UP (ref 80–100)
MCV RBC AUTO: 95.8 FL — SIGNIFICANT CHANGE UP (ref 80–100)
METHOD TYPE: SIGNIFICANT CHANGE UP
MONOCYTES # BLD AUTO: 1.84 K/UL — HIGH (ref 0–0.9)
MONOCYTES # BLD AUTO: 2.51 K/UL — HIGH (ref 0–0.9)
MONOCYTES NFR BLD AUTO: 11.8 % — SIGNIFICANT CHANGE UP (ref 2–14)
MONOCYTES NFR BLD AUTO: 13.6 % — SIGNIFICANT CHANGE UP (ref 2–14)
NEUTROPHILS # BLD AUTO: 11.58 K/UL — HIGH (ref 1.8–7.4)
NEUTROPHILS # BLD AUTO: 13.19 K/UL — HIGH (ref 1.8–7.4)
NEUTROPHILS NFR BLD AUTO: 71.3 % — SIGNIFICANT CHANGE UP (ref 43–77)
NEUTROPHILS NFR BLD AUTO: 74.2 % — SIGNIFICANT CHANGE UP (ref 43–77)
NRBC # BLD: 2 /100 WBCS — SIGNIFICANT CHANGE UP
NRBC # BLD: 2 /100 WBCS — SIGNIFICANT CHANGE UP
NRBC # FLD: 0.32 K/UL — HIGH
NRBC # FLD: 0.39 K/UL — HIGH
ORGANISM # SPEC MICROSCOPIC CNT: SIGNIFICANT CHANGE UP
PHOSPHATE SERPL-MCNC: 5.7 MG/DL — HIGH (ref 2.5–4.5)
PLATELET # BLD AUTO: 208 K/UL — SIGNIFICANT CHANGE UP (ref 150–400)
PLATELET # BLD AUTO: 221 K/UL — SIGNIFICANT CHANGE UP (ref 150–400)
POTASSIUM SERPL-MCNC: 4.4 MMOL/L — SIGNIFICANT CHANGE UP (ref 3.5–5.3)
POTASSIUM SERPL-MCNC: 4.7 MMOL/L — SIGNIFICANT CHANGE UP (ref 3.5–5.3)
POTASSIUM SERPL-SCNC: 4.4 MMOL/L — SIGNIFICANT CHANGE UP (ref 3.5–5.3)
POTASSIUM SERPL-SCNC: 4.7 MMOL/L — SIGNIFICANT CHANGE UP (ref 3.5–5.3)
PROT SERPL-MCNC: 5.6 G/DL — LOW (ref 6–8.3)
PROT SERPL-MCNC: 5.9 G/DL — LOW (ref 6–8.3)
RBC # BLD: 3.2 M/UL — LOW (ref 4.2–5.8)
RBC # BLD: 3.32 M/UL — LOW (ref 4.2–5.8)
RBC # FLD: 17.6 % — HIGH (ref 10.3–14.5)
RBC # FLD: 17.6 % — HIGH (ref 10.3–14.5)
SODIUM SERPL-SCNC: 125 MMOL/L — LOW (ref 135–145)
SODIUM SERPL-SCNC: 128 MMOL/L — LOW (ref 135–145)
SPECIMEN SOURCE: SIGNIFICANT CHANGE UP
TROPONIN T, HIGH SENSITIVITY RESULT: 1952 NG/L — CRITICAL HIGH
TROPONIN T, HIGH SENSITIVITY RESULT: 2054 NG/L — CRITICAL HIGH
WBC # BLD: 15.61 K/UL — HIGH (ref 3.8–10.5)
WBC # BLD: 18.48 K/UL — HIGH (ref 3.8–10.5)
WBC # FLD AUTO: 15.61 K/UL — HIGH (ref 3.8–10.5)
WBC # FLD AUTO: 18.48 K/UL — HIGH (ref 3.8–10.5)

## 2021-07-11 PROCEDURE — 99233 SBSQ HOSP IP/OBS HIGH 50: CPT

## 2021-07-11 PROCEDURE — 99291 CRITICAL CARE FIRST HOUR: CPT

## 2021-07-11 RX ORDER — VANCOMYCIN HCL 1 G
1250 VIAL (EA) INTRAVENOUS ONCE
Refills: 0 | Status: COMPLETED | OUTPATIENT
Start: 2021-07-11 | End: 2021-07-11

## 2021-07-11 RX ORDER — HEPARIN SODIUM 5000 [USP'U]/ML
3500 INJECTION INTRAVENOUS; SUBCUTANEOUS EVERY 6 HOURS
Refills: 0 | Status: DISCONTINUED | OUTPATIENT
Start: 2021-07-11 | End: 2021-07-13

## 2021-07-11 RX ORDER — APIXABAN 2.5 MG/1
5 TABLET, FILM COATED ORAL
Refills: 0 | Status: DISCONTINUED | OUTPATIENT
Start: 2021-07-11 | End: 2021-07-11

## 2021-07-11 RX ORDER — HEPARIN SODIUM 5000 [USP'U]/ML
7000 INJECTION INTRAVENOUS; SUBCUTANEOUS EVERY 6 HOURS
Refills: 0 | Status: DISCONTINUED | OUTPATIENT
Start: 2021-07-11 | End: 2021-07-13

## 2021-07-11 RX ORDER — HEPARIN SODIUM 5000 [USP'U]/ML
1000 INJECTION INTRAVENOUS; SUBCUTANEOUS
Qty: 25000 | Refills: 0 | Status: DISCONTINUED | OUTPATIENT
Start: 2021-07-11 | End: 2021-07-16

## 2021-07-11 RX ORDER — HEPARIN SODIUM 5000 [USP'U]/ML
INJECTION INTRAVENOUS; SUBCUTANEOUS
Qty: 25000 | Refills: 0 | Status: DISCONTINUED | OUTPATIENT
Start: 2021-07-11 | End: 2021-07-11

## 2021-07-11 RX ADMIN — Medication 1: at 07:32

## 2021-07-11 RX ADMIN — HEPARIN SODIUM 1000 UNIT(S)/HR: 5000 INJECTION INTRAVENOUS; SUBCUTANEOUS at 21:04

## 2021-07-11 RX ADMIN — MUPIROCIN 1 APPLICATION(S): 20 OINTMENT TOPICAL at 17:32

## 2021-07-11 RX ADMIN — MUPIROCIN 1 APPLICATION(S): 20 OINTMENT TOPICAL at 05:18

## 2021-07-11 RX ADMIN — Medication 100 MILLIGRAM(S): at 21:03

## 2021-07-11 RX ADMIN — Medication 1: at 11:31

## 2021-07-11 RX ADMIN — Medication 40.4 MICROGRAM(S)/KG/MIN: at 07:28

## 2021-07-11 RX ADMIN — CHLORHEXIDINE GLUCONATE 1 APPLICATION(S): 213 SOLUTION TOPICAL at 11:32

## 2021-07-11 RX ADMIN — Medication 1: at 16:34

## 2021-07-11 RX ADMIN — HEPARIN SODIUM 1300 UNIT(S)/HR: 5000 INJECTION INTRAVENOUS; SUBCUTANEOUS at 00:21

## 2021-07-11 RX ADMIN — HEPARIN SODIUM 1100 UNIT(S)/HR: 5000 INJECTION INTRAVENOUS; SUBCUTANEOUS at 07:29

## 2021-07-11 RX ADMIN — Medication 81 MILLIGRAM(S): at 11:30

## 2021-07-11 RX ADMIN — ATORVASTATIN CALCIUM 40 MILLIGRAM(S): 80 TABLET, FILM COATED ORAL at 21:06

## 2021-07-11 RX ADMIN — PANTOPRAZOLE SODIUM 40 MILLIGRAM(S): 20 TABLET, DELAYED RELEASE ORAL at 17:32

## 2021-07-11 RX ADMIN — Medication 40.4 MICROGRAM(S)/KG/MIN: at 21:04

## 2021-07-11 RX ADMIN — Medication 166.67 MILLIGRAM(S): at 16:48

## 2021-07-11 RX ADMIN — PANTOPRAZOLE SODIUM 40 MILLIGRAM(S): 20 TABLET, DELAYED RELEASE ORAL at 05:18

## 2021-07-11 NOTE — PROGRESS NOTE ADULT - SUBJECTIVE AND OBJECTIVE BOX
Follow Up:      Inverval History/ROS:Patient is a 70y old  Male who presents with a chief complaint of Chest pain and GIB (10 Jul 2021 16:17)    MS improved   On pressors    Still notes some back pain  C/o pain to right wrist area    Allergies    lisinopril (Other)  opioid-like analgesics (Other)    Intolerances        ANTIMICROBIALS:  ceFAZolin   IVPB 1000 every 24 hours      OTHER MEDS:  acetaminophen   Tablet .. 1000 milliGRAM(s) Oral every 6 hours PRN  aspirin  chewable 81 milliGRAM(s) Oral daily  atorvastatin 40 milliGRAM(s) Oral at bedtime  calamine/zinc oxide Lotion 1 Application(s) Topical three times a day PRN  chlorhexidine 2% Cloths 1 Application(s) Topical daily  dextrose 40% Gel 15 Gram(s) Oral once  dextrose 5%. 1000 milliLiter(s) IV Continuous <Continuous>  dextrose 5%. 1000 milliLiter(s) IV Continuous <Continuous>  dextrose 50% Injectable 25 Gram(s) IV Push once  dextrose 50% Injectable 12.5 Gram(s) IV Push once  dextrose 50% Injectable 25 Gram(s) IV Push once  epoetin danilo-epbx (RETACRIT) Injectable 06620 Unit(s) IV Push <User Schedule>  glucagon  Injectable 1 milliGRAM(s) IntraMuscular once  heparin   Injectable 7000 Unit(s) IV Push every 6 hours PRN  heparin   Injectable 3500 Unit(s) IV Push every 6 hours PRN  heparin  Infusion.  Unit(s)/Hr IV Continuous <Continuous>  insulin lispro (ADMELOG) corrective regimen sliding scale   SubCutaneous three times a day before meals  insulin lispro (ADMELOG) corrective regimen sliding scale   SubCutaneous at bedtime  metoprolol tartrate 25 milliGRAM(s) Oral two times a day  mupirocin 2% Ointment 1 Application(s) Topical two times a day  norepinephrine Infusion 0.25 MICROgram(s)/kG/Min IV Continuous <Continuous>  pantoprazole  Injectable 40 milliGRAM(s) IV Push every 12 hours  sodium chloride 0.9%. 1000 milliLiter(s) IV Continuous <Continuous>      Vital Signs Last 24 Hrs  T(C): 36.5 (11 Jul 2021 12:00), Max: 36.8 (11 Jul 2021 08:00)  T(F): 97.7 (11 Jul 2021 12:00), Max: 98.2 (11 Jul 2021 08:00)  HR: 61 (11 Jul 2021 12:00) (58 - 70)  BP: 134/54 (11 Jul 2021 12:00) (87/45 - 134/54)  BP(mean): 75 (11 Jul 2021 12:00) (49 - 92)  RR: 24 (11 Jul 2021 12:00) (17 - 30)  SpO2: 95% (11 Jul 2021 12:00) (87% - 100%)    PHYSICAL EXAM:  General: [x ] non-toxic  HEAD/EYES: [ ] PERRL [ x] white sclera [ ] icterus  ENT:  [ ] normal [ ] supple [ ] thrush [ ] pharyngeal exudate  Cardiovascular:   [ ] murmur x[ ] normal [ ] PPM/AICD  Respiratory:  [ x] clear to ausculation bilaterally  GI:  [x ] soft, non-tender, normal bowel sounds  :  [ ] wagner [x ] no CVA tenderness   Musculoskeletal:  [ ] no synovitis  Neurologic:  [ ] non-focal exam   Skin:  [x ] no rash  Lymph: [x ] no lymphadenopathy  Psychiatric:  [ ] appropriate affect [ ] alert & oriented  Lines:  [ x]RUE proximal to wrist + phlebitis [ ] central line                                9.5    18.48 )-----------( 208      ( 11 Jul 2021 06:36 )             31.8       07-11    128<L>  |  92<L>  |  28<H>  ----------------------------<  179<H>  4.4   |  20<L>  |  7.83<H>    Ca    8.6      11 Jul 2021 06:36  Phos  4.1     07-10  Mg     1.90     07-10    TPro  5.9<L>  /  Alb  2.9<L>  /  TBili  0.5  /  DBili  x   /  AST  30  /  ALT  14  /  AlkPhos  88  07-11          MICROBIOLOGY:Culture Results:   No growth to date. (07-09-21 @ 08:17)  Culture Results:   Growth in aerobic and anaerobic bottles: Gram Positive Cocci in Clusters (07-09-21 @ 08:17)  Culture Results:   No growth (07-08-21 @ 20:48)  Culture Results:   Growth in aerobic and anaerobic bottles: Staphylococcus aureus  ***Blood Panel PCR results on this specimen are available  approximately 3 hours after the Gram stain result.***  Gram stain, PCR, and/or culture results may not always  correspond dueto difference in methodologies.  ************************************************************  This PCR assay was performed by multiplex PCR. This  Assay tests for 66 bacterial and resistance gene targets.  Please refer to the [facility] test directory  at [email or web address] for details. (07-07-21 @ 23:06)  Culture Results:   Growth in aerobic and anaerobic bottles: Staphylococcus aureus  See previous culture [id number] (07-07-21 @ 23:05)      RADIOLOGY:

## 2021-07-11 NOTE — PROGRESS NOTE ADULT - SUBJECTIVE AND OBJECTIVE BOX
INTERVAL HPI/OVERNIGHT EVENTS:    SUBJECTIVE: Patient seen and examined at bedside.     CONSTITUTIONAL: No weakness, fevers or chills  EYES/ENT: No visual changes;  No vertigo or throat pain   NECK: No pain or stiffness  RESPIRATORY: No cough, wheezing, hemoptysis; No shortness of breath  CARDIOVASCULAR: No chest pain or palpitations  GASTROINTESTINAL: No abdominal or epigastric pain. No nausea, vomiting, or hematemesis; No diarrhea or constipation. No melena or hematochezia.  GENITOURINARY: No dysuria, frequency or hematuria  NEUROLOGICAL: No numbness or weakness  SKIN: No itching, rashes    OBJECTIVE:    VITAL SIGNS:  ICU Vital Signs Last 24 Hrs  T(C): 36.5 (11 Jul 2021 12:00), Max: 36.8 (11 Jul 2021 08:00)  T(F): 97.7 (11 Jul 2021 12:00), Max: 98.2 (11 Jul 2021 08:00)  HR: 61 (11 Jul 2021 12:00) (58 - 70)  BP: 134/54 (11 Jul 2021 12:00) (87/45 - 134/54)  BP(mean): 75 (11 Jul 2021 12:00) (49 - 92)  ABP: --  ABP(mean): --  RR: 24 (11 Jul 2021 12:00) (17 - 30)  SpO2: 95% (11 Jul 2021 12:00) (87% - 100%)        07-10 @ 07:01 - 07-11 @ 07:00  --------------------------------------------------------  IN: 1001.1 mL / OUT: 210 mL / NET: 791.1 mL    07-11 @ 07:01 - 07-11 @ 13:22  --------------------------------------------------------  IN: 174.2 mL / OUT: 50 mL / NET: 124.2 mL      CAPILLARY BLOOD GLUCOSE      POCT Blood Glucose.: 198 mg/dL (11 Jul 2021 11:30)      PHYSICAL EXAM:    General: NAD  HEENT: NC/AT; PERRL, clear conjunctiva  Neck: supple  Respiratory: CTA b/l  Cardiovascular: +S1/S2; RRR  Abdomen: soft, NT/ND; +BS x4  Extremities: WWP, 2+ peripheral pulses b/l; no LE edema  Skin: normal color and turgor; no rash  Neurological:    MEDICATIONS:  MEDICATIONS  (STANDING):  aspirin  chewable 81 milliGRAM(s) Oral daily  atorvastatin 40 milliGRAM(s) Oral at bedtime  ceFAZolin   IVPB 1000 milliGRAM(s) IV Intermittent every 24 hours  chlorhexidine 2% Cloths 1 Application(s) Topical daily  dextrose 40% Gel 15 Gram(s) Oral once  dextrose 5%. 1000 milliLiter(s) (50 mL/Hr) IV Continuous <Continuous>  dextrose 5%. 1000 milliLiter(s) (100 mL/Hr) IV Continuous <Continuous>  dextrose 50% Injectable 25 Gram(s) IV Push once  dextrose 50% Injectable 12.5 Gram(s) IV Push once  dextrose 50% Injectable 25 Gram(s) IV Push once  epoetin danilo-epbx (RETACRIT) Injectable 13372 Unit(s) IV Push <User Schedule>  glucagon  Injectable 1 milliGRAM(s) IntraMuscular once  heparin  Infusion.  Unit(s)/Hr (16 mL/Hr) IV Continuous <Continuous>  insulin lispro (ADMELOG) corrective regimen sliding scale   SubCutaneous three times a day before meals  insulin lispro (ADMELOG) corrective regimen sliding scale   SubCutaneous at bedtime  metoprolol tartrate 25 milliGRAM(s) Oral two times a day  mupirocin 2% Ointment 1 Application(s) Topical two times a day  norepinephrine Infusion 0.25 MICROgram(s)/kG/Min (40.4 mL/Hr) IV Continuous <Continuous>  pantoprazole  Injectable 40 milliGRAM(s) IV Push every 12 hours  sodium chloride 0.9%. 1000 milliLiter(s) (50 mL/Hr) IV Continuous <Continuous>    MEDICATIONS  (PRN):  acetaminophen   Tablet .. 1000 milliGRAM(s) Oral every 6 hours PRN Temp greater or equal to 38C (100.4F), Mild Pain (1 - 3), Moderate Pain (4 - 6)  calamine/zinc oxide Lotion 1 Application(s) Topical three times a day PRN Itching  heparin   Injectable 7000 Unit(s) IV Push every 6 hours PRN For aPTT less than 40  heparin   Injectable 3500 Unit(s) IV Push every 6 hours PRN For aPTT between 40 - 57      ALLERGIES:  Allergies    lisinopril (Other)  opioid-like analgesics (Other)    Intolerances        LABS:                        9.5    18.48 )-----------( 208      ( 11 Jul 2021 06:36 )             31.8     07-11    128<L>  |  92<L>  |  28<H>  ----------------------------<  179<H>  4.4   |  20<L>  |  7.83<H>    Ca    8.6      11 Jul 2021 06:36  Phos  4.1     07-10  Mg     1.90     07-10    TPro  5.9<L>  /  Alb  2.9<L>  /  TBili  0.5  /  DBili  x   /  AST  30  /  ALT  14  /  AlkPhos  88  07-11    PTT - ( 11 Jul 2021 06:36 )  PTT:116.8 sec      RADIOLOGY & ADDITIONAL TESTS: Reviewed. INTERVAL HPI/OVERNIGHT EVENTS: NAEO     SUBJECTIVE: Patient seen and examined at bedside. He was noted to be sitting comfortably and eating. Denies CP, SOB, constipation, diarrhea.     CONSTITUTIONAL: No weakness, fevers or chills  EYES/ENT: No visual changes;  No vertigo or throat pain   NECK: No pain or stiffness  RESPIRATORY: No cough hemoptysis; No shortness of breath  CARDIOVASCULAR: No chest pain or palpitations  GASTROINTESTINAL: No abdominal or epigastric pain. No nausea, vomiting, or hematemesis; No diarrhea or constipation. No melena or hematochezia.  GENITOURINARY: No dysuria, frequency or hematuria  NEUROLOGICAL: No numbness or weakness  SKIN: No itching, rashes    OBJECTIVE:    VITAL SIGNS:  ICU Vital Signs Last 24 Hrs  T(C): 36.5 (11 Jul 2021 12:00), Max: 36.8 (11 Jul 2021 08:00)  T(F): 97.7 (11 Jul 2021 12:00), Max: 98.2 (11 Jul 2021 08:00)  HR: 61 (11 Jul 2021 12:00) (58 - 70)  BP: 134/54 (11 Jul 2021 12:00) (87/45 - 134/54)  BP(mean): 75 (11 Jul 2021 12:00) (49 - 92)  ABP: --  ABP(mean): --  RR: 24 (11 Jul 2021 12:00) (17 - 30)  SpO2: 95% (11 Jul 2021 12:00) (87% - 100%)        07-10 @ 07:01 - 07-11 @ 07:00  --------------------------------------------------------  IN: 1001.1 mL / OUT: 210 mL / NET: 791.1 mL    07-11 @ 07:01  -  07-11 @ 13:22  --------------------------------------------------------  IN: 174.2 mL / OUT: 50 mL / NET: 124.2 mL      CAPILLARY BLOOD GLUCOSE      POCT Blood Glucose.: 198 mg/dL (11 Jul 2021 11:30)      PHYSICAL EXAM:    General: NAD  HEENT: NC/AT; PERRL, clear conjunctiva  Neck: supple  Respiratory: CTA b/l  Cardiovascular: +S1/S2; RRR  Abdomen: soft, NT/ND; +BS x4  Extremities: fistula in LUE; non pitting edema RUE; 1+ pitting edema RLE; 2+ peripheral pulses b/l;   Skin: normal color and turgor; no rash    MEDICATIONS:  MEDICATIONS  (STANDING):  aspirin  chewable 81 milliGRAM(s) Oral daily  atorvastatin 40 milliGRAM(s) Oral at bedtime  ceFAZolin   IVPB 1000 milliGRAM(s) IV Intermittent every 24 hours  chlorhexidine 2% Cloths 1 Application(s) Topical daily  dextrose 40% Gel 15 Gram(s) Oral once  dextrose 5%. 1000 milliLiter(s) (50 mL/Hr) IV Continuous <Continuous>  dextrose 5%. 1000 milliLiter(s) (100 mL/Hr) IV Continuous <Continuous>  dextrose 50% Injectable 25 Gram(s) IV Push once  dextrose 50% Injectable 12.5 Gram(s) IV Push once  dextrose 50% Injectable 25 Gram(s) IV Push once  epoetin danilo-epbx (RETACRIT) Injectable 71874 Unit(s) IV Push <User Schedule>  glucagon  Injectable 1 milliGRAM(s) IntraMuscular once  heparin  Infusion.  Unit(s)/Hr (16 mL/Hr) IV Continuous <Continuous>  insulin lispro (ADMELOG) corrective regimen sliding scale   SubCutaneous three times a day before meals  insulin lispro (ADMELOG) corrective regimen sliding scale   SubCutaneous at bedtime  metoprolol tartrate 25 milliGRAM(s) Oral two times a day  mupirocin 2% Ointment 1 Application(s) Topical two times a day  norepinephrine Infusion 0.25 MICROgram(s)/kG/Min (40.4 mL/Hr) IV Continuous <Continuous>  pantoprazole  Injectable 40 milliGRAM(s) IV Push every 12 hours  sodium chloride 0.9%. 1000 milliLiter(s) (50 mL/Hr) IV Continuous <Continuous>    MEDICATIONS  (PRN):  acetaminophen   Tablet .. 1000 milliGRAM(s) Oral every 6 hours PRN Temp greater or equal to 38C (100.4F), Mild Pain (1 - 3), Moderate Pain (4 - 6)  calamine/zinc oxide Lotion 1 Application(s) Topical three times a day PRN Itching  heparin   Injectable 7000 Unit(s) IV Push every 6 hours PRN For aPTT less than 40  heparin   Injectable 3500 Unit(s) IV Push every 6 hours PRN For aPTT between 40 - 57      ALLERGIES:  Allergies    lisinopril (Other)  opioid-like analgesics (Other)    Intolerances        LABS:                        9.5    18.48 )-----------( 208      ( 11 Jul 2021 06:36 )             31.8     07-11    128<L>  |  92<L>  |  28<H>  ----------------------------<  179<H>  4.4   |  20<L>  |  7.83<H>    Ca    8.6      11 Jul 2021 06:36  Phos  4.1     07-10  Mg     1.90     07-10    TPro  5.9<L>  /  Alb  2.9<L>  /  TBili  0.5  /  DBili  x   /  AST  30  /  ALT  14  /  AlkPhos  88  07-11    PTT - ( 11 Jul 2021 06:36 )  PTT:116.8 sec      RADIOLOGY & ADDITIONAL TESTS: Reviewed.

## 2021-07-11 NOTE — PROGRESS NOTE ADULT - ASSESSMENT
69 yo M with PMhx of CAD s/p CABG (course at that time complicated by pleural effusion requiring chest tubes), ESRD on HD (MW), DMII, HTN, afib on coumadin, and anemia transferred from [facility] for evaluation of chest pain after HD and GIB s/p 2U PRBC transfusion and aflutter/afib with RVR managed with metoprolol. Admitted to MICU for hypotension dependent on pressors likely 2/2 to sepsis.    #Neuro  - no active issues.    #Cardiovascular  - Hypotension: MICU was consulted one day prior to transfer for hypotension responsive to Midodrine and 500cc IV bolus. Today he presented bradycardia and worsening hypotension after placement of chest tube. He was given atropine, Chris and Levophed.  - TTE on 7/7: Right ventricular enlargement with decreased right ventricular systolic function. Inadequate tricuspid regurgitation Doppler envelope precludes estimation of RVSP.    - c/w Levophed wean as tolerated    - monitor HR and BP    - d/c Midodrine    - Troponins on increasing trend, latest at 1,806, CKMB 34.5. EKG with variable baseline in the setting of baseline aflutter. His cardiology service was contacted and they are aware of the condition. Likely 2/2 chronic condition/ESRD on HD vs Hypotensive event. Will continue to trend. Patient currently asx.    #Respiratory  - Loculated Pleural effusions: Chest tube on left hemithorax draining serosanguineous fluid. Currently unclamped.   - pleural fluid cx: (-)   - Pleural fluid: transudative.   - currently saturating adequately on NC @ 5L. Wean as tolerated.  - f/u w/ CT surgery for possible chest tube removal as effusion is transudative and cultures were neg.       #GI/Nutrition:  - Diet: clear liquids, advance as tolerated   - C/o abdominal pain, bedside POCUS didn't show ascites. Continue to monitor.       #/Renal  - ESRD: HD on MWF. Most recent Scr 6.10 an increase from the previous labs. Potassium is stable.   - HD yesterday. No volumen was eliminated. Will reasses patient and consider another session today for volumen removal.      #Skin  - IV Line: new lines today as pt took lines off during the night.  - HD Access: fistula in left upper arm  - Improving erythematous and tender on right wrist. Continue to monitor. Will get wrist U/S.      #ID  - S aureus PCR: (+),  MRSA (-) c/w Mupirocin  - BCx: MSSA bacteremia. Started on Cefazolin 1g QD after HD.   - d/c Zosyn as pt w/ MSSA.  - Serial blood Cx q48h until neg.      #Endocrine  - DMII: c/w Lispro SS. Monitor glucose.      #Hematologic/DVT ppx  - Heparin SQ q8h  - Discuss w/ GI regarding when its ok to start full A/C for afib      #Ethics  - full code as per wife 71 yo M with PMhx of CAD s/p CABG (course at that time complicated by pleural effusion requiring chest tubes), ESRD on HD (MWF), DMII, HTN, afib on coumadin, and anemia transferred from [facility] for evaluation of chest pain after HD and GIB s/p 2U PRBC transfusion and aflutter/afib with RVR managed with metoprolol. Admitted to MICU for hypotension dependent on pressors likely 2/2 to sepsis.    #Neuro  - no active issues.  - At times, get confused in the morning but still A&Ox3    #Cardiovascular  - Hypotension: MICU was consulted one day prior to transfer for hypotension responsive to Midodrine and 500cc IV bolus. Today he presented bradycardia and worsening hypotension after placement of chest tube. He was given atropine, Chris and Levophed.  - TTE on 7/7: Right ventricular enlargement with decreased right ventricular systolic function. Inadequate tricuspid regurgitation Doppler envelope precludes estimation of RVSP.    - c/w Levophed wean as tolerated    - monitor HR and BP    - Troponins on increasing trend, latest at 2000, CKMB 34.5. EKG with variable baseline in the setting of baseline aflutter. His cardiology service was contacted and they are aware of the condition. Likely 2/2 chronic condition/ESRD on HD vs Hypotensive event. Will continue to trend and monitor. Patient currently asx.    A. Fib  - on coumadin at home   - 7/11: tried to transition heparin gtt to eliquis but pt is not a candidate due to his acute MIGUEL; back on heparin gtt     #Respiratory  - Loculated Pleural effusions: Chest tube on left hemithorax draining serosanguineous fluid. Currently unclamped.   - pleural fluid cx: (-); transudative.   - currently saturating adequately on NC @ 5L. Wean as tolerated.  - f/u w/ CT surgery on 7/12 for possible chest tube removal as effusion is transudative and cultures were neg and chest tube output has decreased       #GI/Nutrition:  - Diet: Renal diet    - Was C/o abdominal pain, bedside POCUS didn't show ascites. Continue to monitor.       #/Renal  - ESRD: HD on MWF. Most recent Scr 6.10 an increase from the previous labs. Potassium is stable.   - HD 7/9. No volume was eliminated.   - possible HD tomorrow       #Skin  - IV Line: new lines today as pt took lines off during the night.  - HD Access: fistula in left upper arm  - Improving erythematous and tender on right wrist. Continue to monitor. RUE US neg for thrombus      #ID  - S aureus PCR: (+),  MRSA (-) c/w Mupirocin  - BCx on 7/7 now growing MRSA: restarted on vanc (7/11- )  - Get vanc level before HD and vanc by level   - Surveillance blood Cx q48h until neg.      #Endocrine  - DMII: c/w Lispro SS. Monitor glucose.      #Hematologic/DVT ppx  - Heparin gtt      #Ethics  - full code as per wife

## 2021-07-11 NOTE — CHART NOTE - NSCHARTNOTEFT_GEN_A_CORE
Patient still in MICU on pressors. No acute intervention from thoracic surgery standpoint. Will speak to Dr. Medley this week regarding OR planning. Please call with any questions.     [id number]

## 2021-07-11 NOTE — PROGRESS NOTE ADULT - ASSESSMENT
71 yo M with PMhx of CAD s/p CABG (course at that time complicated by pleural effusion requiring chest tubes), ESRD on HD (MWF), DMII, HTN, afib on coumadin, and anemia presented from [facility] for evaluation of chest pain and GIB.       left sided pleural effusion: loculated and chronic:   CAD:  CABG:  ESRD:   DM:  HTN:  A fibrillation:   GI Bleed    7/7:    left sided pleural effusion: loculated and chronic: he has chr loculated pleural effusion on left side: He had chest tube placement in last June 2020 at another hospital: no chemistry is available but he had negative cytology at that time: This time the effusion seems slightly worse and has loculated effusion with pleural thickening: heis not SOB andhasbeen on roomair: I think , it at all, if this effusion needs to be dealt with : it is probably vats: will contact thoracic surgery : etiology not very clear as there is no previous chemistry: coult be exudative or transudatve: he is on HD and now it is a chr christiano effusion   CAD: cont per cards  CABG: cont current meds  ESRD: on HD   DM: monitor and control  HTN: controlled  A fibrillation: off ac:   GI Bleed: per gi :  DW pt and t [email or web address]    7/8:    left sided pleural effusion:: fever: s/p left sided chest tube placement: cultures sent: however with pr criteria it seems transudative await serum LDH but pleural fluid LDH is low:  already started on broad spectrum antibiotics ID to see: await cultures:   CAD: cont per cards  CABG: cont current meds  ESRD: on HD   DM: monitor and control  HTN: controlled  A fibrillation: off ac:   GI Bleed: per gi :  DW pmd    7/9:    left sided pleural effusion:: fever: s/p left sided chest tube placement: cultures sent: however with pr criteria it seems borderline transudative and transudative by LDH criteria: the vulutres ahve been negative so far:  already started on broad spectrum antibiotics ID to see: await cultures: ? source of spesis  CAD: cont per cards  CABG: cont current meds  ESRD: on HD   DM: monitor and control  HTN: in shock: now on vasopressors:   A fibrillation: off ac:   GI Bleed: per gi :  DW pmd  micu team    7/11:      left sided pleural effusion:: fever: s/p left sided chest tube placement: cultures sent: however with pr criteria it seems borderline transudative and transudative by LDH criteria: the cultures have been negative so far:  already started on broad spectrum antibiotics : Has MSSA bactermia  CAD: cont per cards: still on vasopressors: wean as tolerated ? needs molly ro tule out IE : would defer to cards:   CABG: cont current meds  ESRD: on HD   DM: monitor and control  HTN: in shock: now on vasopressors:   A fibrillation: off ac:   GI Bleed: per gi :  DW pmd  micu team

## 2021-07-11 NOTE — PROGRESS NOTE ADULT - ASSESSMENT
70 year old with ESRD, DM, CAD presented with anemia and chest pain    Now with fever and hypotension- developed during hospitalization/ not present at admission.      Newly diagnosed MSSA bacteremia    1) MSSA bacteremia  Continue Cefazolin  Repeat blood cultures 7/9 with growth in 2 of 4 bottles  Check echo    Consider imaging LS spine  Monitor right wrist- "? phlebitis as focus    Repeat blood culture testing    2) Fever  Likely due to bacteremia  Monitor    3) Pleural effusion  S/p thoracentesis- appears transudative but follow up Culture    4) Leukocytosis  Continue to monitor

## 2021-07-11 NOTE — PROGRESS NOTE ADULT - ATTENDING COMMENTS
69 yo M with PMhx of CAD s/p CABG (course at that time complicated by pleural effusion requiring chest tubes), ESRD on HD (MWF), DMII, HTN, afib on coumadin, and anemia transferred from [facility] for evaluation of chest pain after HD and GIB s/p 2U PRBC transfusion and aflutter/afib. MRSA in blood culture - change cefazolin to vancomycin f/u with ID. pt remains on pressor support. HD tomorrow.

## 2021-07-11 NOTE — PROGRESS NOTE ADULT - SUBJECTIVE AND OBJECTIVE BOX
Date of Service: 07-11-21 @ 14:29    Patient is a 70y old  Male who presents with a chief complaint of Chest pain and GIB (11 Jul 2021 13:22)      Any change in ROS: P tis alert and awake: o SOB: has sepsis: and hypotensoin    MEDICATIONS  (STANDING):  aspirin  chewable 81 milliGRAM(s) Oral daily  atorvastatin 40 milliGRAM(s) Oral at bedtime  ceFAZolin   IVPB 1000 milliGRAM(s) IV Intermittent every 24 hours  chlorhexidine 2% Cloths 1 Application(s) Topical daily  dextrose 40% Gel 15 Gram(s) Oral once  dextrose 5%. 1000 milliLiter(s) (50 mL/Hr) IV Continuous <Continuous>  dextrose 5%. 1000 milliLiter(s) (100 mL/Hr) IV Continuous <Continuous>  dextrose 50% Injectable 25 Gram(s) IV Push once  dextrose 50% Injectable 12.5 Gram(s) IV Push once  dextrose 50% Injectable 25 Gram(s) IV Push once  epoetin adnilo-epbx (RETACRIT) Injectable 63790 Unit(s) IV Push <User Schedule>  glucagon  Injectable 1 milliGRAM(s) IntraMuscular once  heparin  Infusion.  Unit(s)/Hr (16 mL/Hr) IV Continuous <Continuous>  insulin lispro (ADMELOG) corrective regimen sliding scale   SubCutaneous three times a day before meals  insulin lispro (ADMELOG) corrective regimen sliding scale   SubCutaneous at bedtime  metoprolol tartrate 25 milliGRAM(s) Oral two times a day  mupirocin 2% Ointment 1 Application(s) Topical two times a day  norepinephrine Infusion 0.25 MICROgram(s)/kG/Min (40.4 mL/Hr) IV Continuous <Continuous>  pantoprazole  Injectable 40 milliGRAM(s) IV Push every 12 hours  sodium chloride 0.9%. 1000 milliLiter(s) (50 mL/Hr) IV Continuous <Continuous>    MEDICATIONS  (PRN):  acetaminophen   Tablet .. 1000 milliGRAM(s) Oral every 6 hours PRN Temp greater or equal to 38C (100.4F), Mild Pain (1 - 3), Moderate Pain (4 - 6)  calamine/zinc oxide Lotion 1 Application(s) Topical three times a day PRN Itching  heparin   Injectable 7000 Unit(s) IV Push every 6 hours PRN For aPTT less than 40  heparin   Injectable 3500 Unit(s) IV Push every 6 hours PRN For aPTT between 40 - 57    Vital Signs Last 24 Hrs  T(C): 36.5 (11 Jul 2021 12:00), Max: 36.8 (11 Jul 2021 08:00)  T(F): 97.7 (11 Jul 2021 12:00), Max: 98.2 (11 Jul 2021 08:00)  HR: 63 (11 Jul 2021 14:00) (58 - 70)  BP: 121/69 (11 Jul 2021 14:00) (87/45 - 134/54)  BP(mean): 78 (11 Jul 2021 14:00) (49 - 92)  RR: 33 (11 Jul 2021 14:00) (17 - 33)  SpO2: 92% (11 Jul 2021 14:00) (90% - 100%)    I&O's Summary    10 Jul 2021 07:01  -  11 Jul 2021 07:00  --------------------------------------------------------  IN: 1001.1 mL / OUT: 210 mL / NET: 791.1 mL    11 Jul 2021 07:01  -  11 Jul 2021 14:29  --------------------------------------------------------  IN: 247.6 mL / OUT: 50 mL / NET: 197.6 mL          Physical Exam:   GENERAL: NAD, well-groomed, well-developed  HEENT: KATHY/   Atraumatic, Normocephalic  ENMT: No tonsillar erythema, exudates, or enlargement; Moist mucous membranes, Good dentition, No lesions  NECK: Supple, No JVD, Normal thyroid  CHEST/LUNG: Clear to auscultaion  CVS: Regular rate and rhythm; No murmurs, rubs, or gallops  GI: : Soft, Nontender, Nondistended; Bowel sounds present  NERVOUS SYSTEM:  Alert & awake  EXTREMITIES:  2+ Peripheral Pulses, No clubbing, cyanosis, or edema  LYMPH: No lymphadenopathy noted  SKIN: No rashes or lesions  ENDOCRINOLOGY: No Thyromegaly  PSYCH: Appropriate    Labs:  22, 26  CARDIAC MARKERS ( 11 Jul 2021 06:38 )  x     / x     / x     / x     / 18.1 ng/mL  CARDIAC MARKERS ( 10 Jul 2021 18:49 )  x     / x     / 105 U/L / x     / 23.8 ng/mL  CARDIAC MARKERS ( 10 Jul 2021 03:13 )  x     / x     / 190 U/L / x     / 34.5 ng/mL  CARDIAC MARKERS ( 09 Jul 2021 19:22 )  x     / x     / 221 U/L / x     / x      CARDIAC MARKERS ( 09 Jul 2021 19:19 )  x     / x     / x     / x     / 37.2 ng/mL                            9.5    18.48 )-----------( 208      ( 11 Jul 2021 06:36 )             31.8                         9.6    16.90 )-----------( 184      ( 10 Jul 2021 03:13 )             31.6                         9.4    18.84 )-----------( 183      ( 09 Jul 2021 04:17 )             31.3                         10.6   11.98 )-----------( 171      ( 08 Jul 2021 12:21 )             34.7                         10.6   9.47  )-----------( 149      ( 07 Jul 2021 17:50 )             34.9     07-11    128<L>  |  92<L>  |  28<H>  ----------------------------<  179<H>  4.4   |  20<L>  |  7.83<H>  07-10    132<L>  |  93<L>  |  16  ----------------------------<  169<H>  3.9   |  23  |  5.72<H>  07-09    126<L>  |  87<L>  |  18  ----------------------------<  295<H>  4.0   |  17<L>  |  6.42<H>  07-08    134<L>  |  95<L>  |  15  ----------------------------<  186<H>  3.9   |  24  |  6.10<H>  07-08    TNP  |  TNP  |  TNP  ----------------------------<  TNP  TNP   |  TNP  |  TNP  07-07    137  |  96<L>  |  10  ----------------------------<  241<H>  3.8   |  23  |  4.65<H>    Ca    8.6      11 Jul 2021 06:36  Ca    8.8      10 Jul 2021 03:13  Phos  4.1     07-10  Mg     1.90     07-10    TPro  5.9<L>  /  Alb  2.9<L>  /  TBili  0.5  /  DBili  x   /  AST  30  /  ALT  14  /  AlkPhos  88  07-11  TPro  6.0  /  Alb  3.3  /  TBili  0.7  /  DBili  x   /  AST  45<H>  /  ALT  21  /  AlkPhos  77  07-10  TPro  6.0  /  Alb  3.2<L>  /  TBili  0.9  /  DBili  x   /  AST  31  /  ALT  10  /  AlkPhos  66  07-09  TPro  6.5  /  Alb  3.5  /  TBili  0.9  /  DBili  x   /  AST  13  /  ALT  7   /  AlkPhos  71  07-08  TPro  TNP  /  Alb  TNP  /  TBili  TNP  /  DBili  x   /  AST  TNP  /  ALT  TNP  /  AlkPhos  TNP  07-08  TPro  7.3  /  Alb  4.1  /  TBili  0.9  /  DBili  x   /  AST  16  /  ALT  9   /  AlkPhos  83  07-07    CAPILLARY BLOOD GLUCOSE      POCT Blood Glucose.: 198 mg/dL (11 Jul 2021 11:30)  POCT Blood Glucose.: 167 mg/dL (11 Jul 2021 07:31)  POCT Blood Glucose.: 195 mg/dL (10 Jul 2021 21:41)  POCT Blood Glucose.: 218 mg/dL (10 Jul 2021 16:57)      LIVER FUNCTIONS - ( 11 Jul 2021 06:36 )  Alb: 2.9 g/dL / Pro: 5.9 g/dL / ALK PHOS: 88 U/L / ALT: 14 U/L / AST: 30 U/L / GGT: x           PTT - ( 11 Jul 2021 06:36 )  PTT:116.8 sec    Lactate, Blood: 2.4 mmol/L (07-08 @ 12:25)  Lactate, Blood: 1.1 mmol/L (07-08 @ 06:58)  Lactate, Blood: 2.7 mmol/L (07-07 @ 18:29)  Fluid Source PLEURAL  Albumin, Fluid--  Glucose, [id number] mg/dL  Protein total, Fluid3.2 g/dL  Lacatate Dehydrogenase, Fluid67 U/L  pH, Fluid--  Cytopathology-Non Gyn Report--        RECENT CULTURES:  07-09 @ 08:17 .Blood Blood       Growth in aerobic and anaerobic bottles: Gram Positive Cocci in Clusters           No growth to date.    07-08 @ 20:48 .Body Fluid pleural fluid       polymorphonuclear leukocytes seen  No organisms seen  by cytocentrifuge           No growth    07-07 @ 23:06 .Blood Blood-Venous   PCR    Growth in aerobic bottle: Gram Positive Cocci in Clusters  Growth in anaerobic bottle: Gram Positive Cocci in Clusters    Blood Culture PCR  Blood Culture PCR     Growth in aerobic and anaerobic bottles: Staphylococcus aureus  ***Blood Panel PCR results on this specimen are available  approximately 3 hours after the Gram stain result.***  Gram stain, PCR, and/or culture results may not always  correspond dueto difference in methodologies.  ************************************************************  This PCR assay was performed by multiplex PCR. This  Assay tests for 66 bacterial and resistance gene targets.  Please refer to the [email or web address] test directory  at [email or web address] for details.    07-07 @ 23:05 .Blood Blood       Growth in aerobic bottle: Gram Positive Cocci in Clusters  Growth in anaerobic bottle: Gram Positive Cocci in Clusters           Growth in aerobic and anaerobic bottles: Staphylococcus aureus  See previous culture [id number]      rad< from: Xray Chest 1 View- PORTABLE-Routine (Xray Chest 1 View- PORTABLE-Routine in AM.) (07.10.21 @ 09:07) >    EXAM:  XR CHEST PORTABLE ROUTINE 1V        PROCEDURE DATE:  Jul 10 2021         INTERPRETATION:  Chest radiograph (one view)     CPT 50466    CLINICAL INFORMATION:  Evaluate pleural effusion.  No additional information is provided.    TECHNIQUE:  Single frontal view of the chest was obtained.    FINDINGS:  Prior study dated 7/9/2021 was available for review.    The lungs demonstrate slight decreased size of large left pleural effusion as compared to prior study. No right pleural effusion is seen. Increased pulmonary vascular congestion is noted. Median sternotomy wires are noted. The heart is difficult to evaluate.      IMPRESSION: Slight decrease size of large left pleural effusion as compared to prior study dated 7/9/2021.    --- End of Report ---              FAVIOLA MEDRANO MD; Attending Radiologist  This document has been electronically signed. Jul 10 2021  2:45PM    < end of copied text >      RESPIRATORY CULTURES:          Studies  Chest X-RAY  CT SCAN Chest   Venous Dopplers: LE:   CT Abdomen  Others

## 2021-07-11 NOTE — PROGRESS NOTE ADULT - SUBJECTIVE AND OBJECTIVE BOX
CARDIOLOGY ATTENDING    tele - atrial flutter    he denies chest pain, reports mild SOB in bed.  Eating lunch, in good spirits    Review of Systems:   Constitutional: [ ] fevers, [ ] chills.   Skin: [ ] dry skin. [ ] rashes.  Psychiatric: [ ] depression, [ ] anxiety.   Gastrointestinal: [ ] BRBPR, [ ] melena.   Neurological: [ ] confusion. [ ] seizures. [ ] shuffling gait.   Ears,Nose,Mouth and Throat: [ ] ear pain [ ] sore throat.   Eyes: [ ] diplopia.   Respiratory: [ ] hemoptysis. [ ] shortness of breath  Cardiovascular: See HPI above  Hematologic/Lymphatic: [ ] anemia. [ ] painful nodes. [ ] prolonged bleeding.   Genitourinary: [ ] hematuria. [ ] flank pain.   Endocrine: [ ] significant change in weight. [ ] intolerance to heat and cold.     Review of systems [x ] otherwise negative, [ ] otherwise unable to obtain    FH: no family history of sudden cardiac death in first degree relatives    SH: [ ] tobacco, [ ] alcohol, [ ] drugs    acetaminophen   Tablet .. 1000 milliGRAM(s) Oral every 6 hours PRN  aspirin  chewable 81 milliGRAM(s) Oral daily  atorvastatin 40 milliGRAM(s) Oral at bedtime  calamine/zinc oxide Lotion 1 Application(s) Topical three times a day PRN  ceFAZolin   IVPB 1000 milliGRAM(s) IV Intermittent every 24 hours  chlorhexidine 2% Cloths 1 Application(s) Topical daily  dextrose 40% Gel 15 Gram(s) Oral once  dextrose 5%. 1000 milliLiter(s) IV Continuous <Continuous>  dextrose 5%. 1000 milliLiter(s) IV Continuous <Continuous>  dextrose 50% Injectable 25 Gram(s) IV Push once  dextrose 50% Injectable 12.5 Gram(s) IV Push once  dextrose 50% Injectable 25 Gram(s) IV Push once  epoetin danilo-epbx (RETACRIT) Injectable 66884 Unit(s) IV Push <User Schedule>  glucagon  Injectable 1 milliGRAM(s) IntraMuscular once  heparin   Injectable 7000 Unit(s) IV Push every 6 hours PRN  heparin   Injectable 3500 Unit(s) IV Push every 6 hours PRN  heparin  Infusion.  Unit(s)/Hr IV Continuous <Continuous>  insulin lispro (ADMELOG) corrective regimen sliding scale   SubCutaneous three times a day before meals  insulin lispro (ADMELOG) corrective regimen sliding scale   SubCutaneous at bedtime  metoprolol tartrate 25 milliGRAM(s) Oral two times a day  mupirocin 2% Ointment 1 Application(s) Topical two times a day  norepinephrine Infusion 0.25 MICROgram(s)/kG/Min IV Continuous <Continuous>  pantoprazole  Injectable 40 milliGRAM(s) IV Push every 12 hours  sodium chloride 0.9%. 1000 milliLiter(s) IV Continuous <Continuous>                     9.5    18.48 )-----------( 208      ( 11 Jul 2021 06:36 )             31.8     128<L>  |  92<L>  |  28<H>  ----------------------------<  179<H>  4.4   |  20<L>  |  7.83<H>    Ca    8.6      11 Jul 2021 06:36  Phos  4.1     07-10  Mg     1.90     07-10    TPro  5.9<L>  /  Alb  2.9<L>  /  TBili  0.5  /  DBili  x   /  AST  30  /  ALT  14  /  AlkPhos  88  07-11      CARDIAC MARKERS ( 11 Jul 2021 06:38 )  x     / x     / x     / x     / 18.1 ng/mL  CARDIAC MARKERS ( 10 Jul 2021 18:49 )  x     / x     / 105 U/L / x     / 23.8 ng/mL  CARDIAC MARKERS ( 10 Jul 2021 03:13 )  x     / x     / 190 U/L / x     / 34.5 ng/mL  CARDIAC MARKERS ( 09 Jul 2021 19:22 )  x     / x     / 221 U/L / x     / x      CARDIAC MARKERS ( 09 Jul 2021 19:19 )  x     / x     / x     / x     / 37.2 ng/mL  CARDIAC MARKERS ( 09 Jul 2021 13:04 )  x     / x     / x     / x     / 44.8 ng/mL    Trop T 1890, 2054    T(C): 36.5 (07-11-21 @ 12:00), Max: 36.8 (07-11-21 @ 08:00)  HR: 61 (07-11-21 @ 12:00) (58 - 70)  BP: 134/54 (07-11-21 @ 12:00) (87/45 - 134/54)  RR: 24 (07-11-21 @ 12:00) (17 - 30)  SpO2: 95% (07-11-21 @ 12:00) (87% - 100%)    General: no acute distress.   Head: Normocephalic and atraumatic.   Neck: No JVD. No bruits. Supple. Does not appear to be enlarged.   Cardiovascular: + S1,S2 ; RRR Soft systolic murmur at the left lower sternal border. No rubs noted.    Lungs: decreased BS BL  Abdomen: + BS, soft. Non tender. Non distended. No rebound. No guarding.   Extremities: No clubbing/cyanosis/edema.   Neurologic: unable to assess  Skin: Warm and moist. The patient's skin has normal elasticity and good skin turgor.   Psychiatric: Appropriate mood and affect.  Musculoskeletal: unable to assess    DATA  < from: Transthoracic Echocardiogram (07.07.21 @ 18:17) >  ------------------------------------------------------------------------  CONCLUSIONS:  1. Mitral annular calcification, otherwise normal mitral  valve. Minimal mitral regurgitation.  2. Endocardium not well visualized; grossly normal left  ventricular systolic function.  3. Right ventricular enlargement with decreased right  ventricular systolic function.  4. Inadequate tricuspid regurgitation Doppler envelope  precludes estimation of RVSP.  ------------------------------------------------------------------------  Confirmed on  7/7/2021 - 21:16:20 by Osvaldo Bertrand M.D.,  [facility]    < end of copied text >        A/P) 71 y/o male PMH CAD s/p CABG and old PCI, AFL x 1 year on coumadin, HTN, hyperlipidemia, DM, ESRD on HD, and hypothyroidism admitted with septic shock    -EP following for Aflutter/ EKG changes  -supportive are as per MICU  -Abx per ID  -TTE noted, ?need for TRUMAN once stable, will D/W ID  -elevated Trop T with NL CPK, suspect due to demand ischemic in the setting of septic shock  -continue metoprolol for stable CAD with normal LVEF

## 2021-07-11 NOTE — PROGRESS NOTE ADULT - SUBJECTIVE AND OBJECTIVE BOX
EP ATTENDING    tele: typical AFL with PVCs, HR 60s, no evidence of heart block  he denies palpitations, syncope, nor angina, ROS otherwise -  resting comfortably.    Review of Systems:   Constitutional: [ ] fevers, [ ] chills.   Skin: [ ] dry skin. [ ] rashes.  Psychiatric: [ ] depression, [ ] anxiety.   Gastrointestinal: [ ] BRBPR, [ ] melena.   Neurological: [ ] confusion. [ ] seizures. [ ] shuffling gait.   Ears,Nose,Mouth and Throat: [ ] ear pain [ ] sore throat.   Eyes: [ ] diplopia.   Respiratory: [ ] hemoptysis. [ ] shortness of breath  Cardiovascular: See HPI above  Hematologic/Lymphatic: [ ] anemia. [ ] painful nodes. [ ] prolonged bleeding.   Genitourinary: [ ] hematuria. [ ] flank pain.   Endocrine: [ ] significant change in weight. [ ] intolerance to heat and cold.     Review of systems [ x] otherwise negative, [ ] otherwise unable to obtain    FH: no family history of sudden cardiac death in first degree relatives    SH: [ ] tobacco, [ ] alcohol, [ ] drugs    acetaminophen   Tablet .. 1000 milliGRAM(s) Oral every 6 hours PRN  apixaban 5 milliGRAM(s) Oral two times a day  aspirin  chewable 81 milliGRAM(s) Oral daily  atorvastatin 40 milliGRAM(s) Oral at bedtime  calamine/zinc oxide Lotion 1 Application(s) Topical three times a day PRN  ceFAZolin   IVPB 1000 milliGRAM(s) IV Intermittent every 24 hours  chlorhexidine 2% Cloths 1 Application(s) Topical daily  dextrose 40% Gel 15 Gram(s) Oral once  dextrose 5%. 1000 milliLiter(s) IV Continuous <Continuous>  dextrose 5%. 1000 milliLiter(s) IV Continuous <Continuous>  dextrose 50% Injectable 25 Gram(s) IV Push once  dextrose 50% Injectable 12.5 Gram(s) IV Push once  dextrose 50% Injectable 25 Gram(s) IV Push once  epoetin danilo-epbx (RETACRIT) Injectable 48262 Unit(s) IV Push <User Schedule>  glucagon  Injectable 1 milliGRAM(s) IntraMuscular once  insulin lispro (ADMELOG) corrective regimen sliding scale   SubCutaneous three times a day before meals  insulin lispro (ADMELOG) corrective regimen sliding scale   SubCutaneous at bedtime  metoprolol tartrate 25 milliGRAM(s) Oral two times a day  mupirocin 2% Ointment 1 Application(s) Topical two times a day  norepinephrine Infusion 0.25 MICROgram(s)/kG/Min IV Continuous <Continuous>  pantoprazole  Injectable 40 milliGRAM(s) IV Push every 12 hours  sodium chloride 0.9%. 1000 milliLiter(s) IV Continuous <Continuous>                            9.5    18.48 )-----------( 208      ( 11 Jul 2021 06:36 )             31.8       07-11    128<L>  |  92<L>  |  28<H>  ----------------------------<  179<H>  4.4   |  20<L>  |  7.83<H>    Ca    8.6      11 Jul 2021 06:36  Phos  4.1     07-10  Mg     1.90     07-10    TPro  5.9<L>  /  Alb  2.9<L>  /  TBili  0.5  /  DBili  x   /  AST  30  /  ALT  14  /  AlkPhos  88  07-11      CARDIAC MARKERS ( 11 Jul 2021 06:38 )  x     / x     / x     / x     / 18.1 ng/mL  CARDIAC MARKERS ( 10 Jul 2021 18:49 )  x     / x     / 105 U/L / x     / 23.8 ng/mL  CARDIAC MARKERS ( 10 Jul 2021 03:13 )  x     / x     / 190 U/L / x     / 34.5 ng/mL  CARDIAC MARKERS ( 09 Jul 2021 19:22 )  x     / x     / 221 U/L / x     / x      CARDIAC MARKERS ( 09 Jul 2021 19:19 )  x     / x     / x     / x     / 37.2 ng/mL  CARDIAC MARKERS ( 09 Jul 2021 13:04 )  x     / x     / x     / x     / 44.8 ng/mL        T(C): 36.6 (07-11-21 @ 16:00), Max: 36.8 (07-11-21 @ 08:00)  HR: 56 (07-11-21 @ 16:00) (56 - 70)  BP: 121/56 (07-11-21 @ 16:00) (87/45 - 134/54)  RR: 22 (07-11-21 @ 16:00) (17 - 33)  SpO2: 98% (07-11-21 @ 16:00) (90% - 100%)  Wt(kg): --    I&O's Summary    10 Jul 2021 07:01  -  11 Jul 2021 07:00  --------------------------------------------------------  IN: 1001.1 mL / OUT: 210 mL / NET: 791.1 mL    11 Jul 2021 07:01  -  11 Jul 2021 17:10  --------------------------------------------------------  IN: 309.6 mL / OUT: 100 mL / NET: 209.6 mL      General: Well nourished in no acute distress. Alert and Oriented * 3.   Head: Normocephalic and atraumatic.   Neck: No JVD. No bruits. Supple. Does not appear to be enlarged.   Cardiovascular: + S1,S2 ; RRR Soft systolic murmur at the left lower sternal border. No rubs noted.    Lungs: CTA b/l. No rhonchi, rales or wheezes.   Abdomen: + BS, soft. Non tender. Non distended. No rebound. No guarding.   Extremities: No clubbing/cyanosis/edema.   Neurologic: Moves all four extremities. Full range of motion.   Skin: Warm and moist. The patient's skin has normal elasticity and good skin turgor.   Psychiatric: Appropriate mood and affect.  Musculoskeletal: Normal range of motion, normal strength      A/P) 69 y/o male PMH CAD s/p CABG, PAF/AFL on coumadin, HTN, hyperlipidemia, DM, ESRD on HD, and hypothyroidism a/w sepsis    -continue metoprolol for rate control  -continue coumadin for stroke prevention if no contraindication (f/u GI)  -no further inpatient EP workup needed    -supportive are as per MICU- NorEpi (0.16) for septic shock, MAP ~65-70mmHg at present time  -f/u cardiology  -no indication for PPM at this time    Pierce Nguyen M.D.  Cardiac Electrophysiology  [phone]

## 2021-07-12 LAB
ALBUMIN SERPL ELPH-MCNC: 3 G/DL — LOW (ref 3.3–5)
ALBUMIN SERPL ELPH-MCNC: 3 G/DL — LOW (ref 3.3–5)
ALP SERPL-CCNC: 90 U/L — SIGNIFICANT CHANGE UP (ref 40–120)
ALP SERPL-CCNC: 93 U/L — SIGNIFICANT CHANGE UP (ref 40–120)
ALT FLD-CCNC: 5 U/L — SIGNIFICANT CHANGE UP (ref 4–41)
ALT FLD-CCNC: 8 U/L — SIGNIFICANT CHANGE UP (ref 4–41)
ANION GAP SERPL CALC-SCNC: 17 MMOL/L — HIGH (ref 7–14)
ANION GAP SERPL CALC-SCNC: 21 MMOL/L — HIGH (ref 7–14)
APTT BLD: 80.4 SEC — HIGH (ref 27–36.3)
APTT BLD: 86.3 SEC — HIGH (ref 27–36.3)
AST SERPL-CCNC: 24 U/L — SIGNIFICANT CHANGE UP (ref 4–40)
AST SERPL-CCNC: 27 U/L — SIGNIFICANT CHANGE UP (ref 4–40)
B19V DNA FLD QL NAA+PROBE: SIGNIFICANT CHANGE UP IU/ML
BASOPHILS # BLD AUTO: 0.15 K/UL — SIGNIFICANT CHANGE UP (ref 0–0.2)
BASOPHILS # BLD AUTO: 0.17 K/UL — SIGNIFICANT CHANGE UP (ref 0–0.2)
BASOPHILS NFR BLD AUTO: 0.9 % — SIGNIFICANT CHANGE UP (ref 0–2)
BASOPHILS NFR BLD AUTO: 1.1 % — SIGNIFICANT CHANGE UP (ref 0–2)
BILIRUB SERPL-MCNC: 0.4 MG/DL — SIGNIFICANT CHANGE UP (ref 0.2–1.2)
BILIRUB SERPL-MCNC: 0.4 MG/DL — SIGNIFICANT CHANGE UP (ref 0.2–1.2)
BLOOD GAS ARTERIAL COMPREHENSIVE RESULT: SIGNIFICANT CHANGE UP
BUN SERPL-MCNC: 23 MG/DL — SIGNIFICANT CHANGE UP (ref 7–23)
BUN SERPL-MCNC: 33 MG/DL — HIGH (ref 7–23)
CALCIUM SERPL-MCNC: 8.4 MG/DL — SIGNIFICANT CHANGE UP (ref 8.4–10.5)
CALCIUM SERPL-MCNC: 9 MG/DL — SIGNIFICANT CHANGE UP (ref 8.4–10.5)
CHLORIDE SERPL-SCNC: 90 MMOL/L — LOW (ref 98–107)
CHLORIDE SERPL-SCNC: 92 MMOL/L — LOW (ref 98–107)
CK SERPL-CCNC: 56 U/L — SIGNIFICANT CHANGE UP (ref 30–200)
CO2 SERPL-SCNC: 19 MMOL/L — LOW (ref 22–31)
CO2 SERPL-SCNC: 21 MMOL/L — LOW (ref 22–31)
CREAT SERPL-MCNC: 6.05 MG/DL — HIGH (ref 0.5–1.3)
CREAT SERPL-MCNC: 8.49 MG/DL — HIGH (ref 0.5–1.3)
EOSINOPHIL # BLD AUTO: 0.27 K/UL — SIGNIFICANT CHANGE UP (ref 0–0.5)
EOSINOPHIL # BLD AUTO: 0.55 K/UL — HIGH (ref 0–0.5)
EOSINOPHIL NFR BLD AUTO: 2 % — SIGNIFICANT CHANGE UP (ref 0–6)
EOSINOPHIL NFR BLD AUTO: 3.1 % — SIGNIFICANT CHANGE UP (ref 0–6)
GLUCOSE BLDC GLUCOMTR-MCNC: 142 MG/DL — HIGH (ref 70–99)
GLUCOSE BLDC GLUCOMTR-MCNC: 168 MG/DL — HIGH (ref 70–99)
GLUCOSE BLDC GLUCOMTR-MCNC: 171 MG/DL — HIGH (ref 70–99)
GLUCOSE BLDC GLUCOMTR-MCNC: 176 MG/DL — HIGH (ref 70–99)
GLUCOSE BLDC GLUCOMTR-MCNC: 192 MG/DL — HIGH (ref 70–99)
GLUCOSE BLDC GLUCOMTR-MCNC: 194 MG/DL — HIGH (ref 70–99)
GLUCOSE SERPL-MCNC: 183 MG/DL — HIGH (ref 70–99)
GLUCOSE SERPL-MCNC: 207 MG/DL — HIGH (ref 70–99)
HCT VFR BLD CALC: 29.7 % — LOW (ref 39–50)
HCT VFR BLD CALC: 29.8 % — LOW (ref 39–50)
HCT VFR BLD CALC: 31.8 % — LOW (ref 39–50)
HGB BLD-MCNC: 8.9 G/DL — LOW (ref 13–17)
HGB BLD-MCNC: 9 G/DL — LOW (ref 13–17)
HGB BLD-MCNC: 9.3 G/DL — LOW (ref 13–17)
IANC: 10.79 K/UL — HIGH (ref 1.5–8.5)
IANC: 9.19 K/UL — HIGH (ref 1.5–8.5)
IMM GRANULOCYTES NFR BLD AUTO: 3.7 % — HIGH (ref 0–1.5)
IMM GRANULOCYTES NFR BLD AUTO: 5.1 % — HIGH (ref 0–1.5)
LYMPHOCYTES # BLD AUTO: 1.18 K/UL — SIGNIFICANT CHANGE UP (ref 1–3.3)
LYMPHOCYTES # BLD AUTO: 17.6 % — SIGNIFICANT CHANGE UP (ref 13–44)
LYMPHOCYTES # BLD AUTO: 3.16 K/UL — SIGNIFICANT CHANGE UP (ref 1–3.3)
LYMPHOCYTES # BLD AUTO: 8.7 % — LOW (ref 13–44)
MAGNESIUM SERPL-MCNC: 1.7 MG/DL — SIGNIFICANT CHANGE UP (ref 1.6–2.6)
MAGNESIUM SERPL-MCNC: 2.1 MG/DL — SIGNIFICANT CHANGE UP (ref 1.6–2.6)
MCHC RBC-ENTMCNC: 28.4 PG — SIGNIFICANT CHANGE UP (ref 27–34)
MCHC RBC-ENTMCNC: 29.2 GM/DL — LOW (ref 32–36)
MCHC RBC-ENTMCNC: 30 GM/DL — LOW (ref 32–36)
MCHC RBC-ENTMCNC: 30.2 GM/DL — LOW (ref 32–36)
MCV RBC AUTO: 94 FL — SIGNIFICANT CHANGE UP (ref 80–100)
MCV RBC AUTO: 94.9 FL — SIGNIFICANT CHANGE UP (ref 80–100)
MCV RBC AUTO: 97 FL — SIGNIFICANT CHANGE UP (ref 80–100)
MONOCYTES # BLD AUTO: 2.11 K/UL — HIGH (ref 0–0.9)
MONOCYTES # BLD AUTO: 2.66 K/UL — HIGH (ref 0–0.9)
MONOCYTES NFR BLD AUTO: 14.8 % — HIGH (ref 2–14)
MONOCYTES NFR BLD AUTO: 15.5 % — HIGH (ref 2–14)
NEUTROPHILS # BLD AUTO: 10.79 K/UL — HIGH (ref 1.8–7.4)
NEUTROPHILS # BLD AUTO: 9.19 K/UL — HIGH (ref 1.8–7.4)
NEUTROPHILS NFR BLD AUTO: 59.9 % — SIGNIFICANT CHANGE UP (ref 43–77)
NEUTROPHILS NFR BLD AUTO: 67.6 % — SIGNIFICANT CHANGE UP (ref 43–77)
NRBC # BLD: 4 /100 WBCS — SIGNIFICANT CHANGE UP
NRBC # BLD: 4 /100 WBCS — SIGNIFICANT CHANGE UP
NRBC # BLD: 7 /100 WBCS — SIGNIFICANT CHANGE UP
NRBC # FLD: 0.52 K/UL — HIGH
NRBC # FLD: 0.64 K/UL — HIGH
NRBC # FLD: 0.96 K/UL — HIGH
PHOSPHATE SERPL-MCNC: 4.5 MG/DL — SIGNIFICANT CHANGE UP (ref 2.5–4.5)
PHOSPHATE SERPL-MCNC: 5.7 MG/DL — HIGH (ref 2.5–4.5)
PLATELET # BLD AUTO: 218 K/UL — SIGNIFICANT CHANGE UP (ref 150–400)
PLATELET # BLD AUTO: 234 K/UL — SIGNIFICANT CHANGE UP (ref 150–400)
PLATELET # BLD AUTO: 255 K/UL — SIGNIFICANT CHANGE UP (ref 150–400)
POTASSIUM SERPL-MCNC: 4.2 MMOL/L — SIGNIFICANT CHANGE UP (ref 3.5–5.3)
POTASSIUM SERPL-MCNC: 5 MMOL/L — SIGNIFICANT CHANGE UP (ref 3.5–5.3)
POTASSIUM SERPL-SCNC: 4.2 MMOL/L — SIGNIFICANT CHANGE UP (ref 3.5–5.3)
POTASSIUM SERPL-SCNC: 5 MMOL/L — SIGNIFICANT CHANGE UP (ref 3.5–5.3)
PROCALCITONIN SERPL-MCNC: 3.32 NG/ML — HIGH (ref 0.02–0.1)
PROLACTIN SERPL-MCNC: 28.1 NG/ML — HIGH (ref 4.1–18.4)
PROT SERPL-MCNC: 5.7 G/DL — LOW (ref 6–8.3)
PROT SERPL-MCNC: 5.7 G/DL — LOW (ref 6–8.3)
RBC # BLD: 3.13 M/UL — LOW (ref 4.2–5.8)
RBC # BLD: 3.17 M/UL — LOW (ref 4.2–5.8)
RBC # BLD: 3.28 M/UL — LOW (ref 4.2–5.8)
RBC # FLD: 17.8 % — HIGH (ref 10.3–14.5)
RBC # FLD: 17.9 % — HIGH (ref 10.3–14.5)
RBC # FLD: 18.2 % — HIGH (ref 10.3–14.5)
SODIUM SERPL-SCNC: 130 MMOL/L — LOW (ref 135–145)
SODIUM SERPL-SCNC: 130 MMOL/L — LOW (ref 135–145)
TROPONIN T, HIGH SENSITIVITY RESULT: 1963 NG/L — CRITICAL HIGH
VANCOMYCIN FLD-MCNC: 29.9 UG/ML
WBC # BLD: 13.27 K/UL — HIGH (ref 3.8–10.5)
WBC # BLD: 13.6 K/UL — HIGH (ref 3.8–10.5)
WBC # BLD: 18 K/UL — HIGH (ref 3.8–10.5)
WBC # FLD AUTO: 13.27 K/UL — HIGH (ref 3.8–10.5)
WBC # FLD AUTO: 13.6 K/UL — HIGH (ref 3.8–10.5)
WBC # FLD AUTO: 18 K/UL — HIGH (ref 3.8–10.5)

## 2021-07-12 PROCEDURE — 71045 X-RAY EXAM CHEST 1 VIEW: CPT | Mod: 26,76

## 2021-07-12 PROCEDURE — 99291 CRITICAL CARE FIRST HOUR: CPT

## 2021-07-12 PROCEDURE — 99233 SBSQ HOSP IP/OBS HIGH 50: CPT

## 2021-07-12 RX ORDER — DILTIAZEM HCL 120 MG
30 CAPSULE, EXT RELEASE 24 HR ORAL
Refills: 0 | Status: DISCONTINUED | OUTPATIENT
Start: 2021-07-12 | End: 2021-07-14

## 2021-07-12 RX ADMIN — Medication 40.4 MICROGRAM(S)/KG/MIN: at 08:22

## 2021-07-12 RX ADMIN — Medication 40.4 MICROGRAM(S)/KG/MIN: at 02:16

## 2021-07-12 RX ADMIN — CHLORHEXIDINE GLUCONATE 1 APPLICATION(S): 213 SOLUTION TOPICAL at 11:30

## 2021-07-12 RX ADMIN — HEPARIN SODIUM 1000 UNIT(S)/HR: 5000 INJECTION INTRAVENOUS; SUBCUTANEOUS at 09:18

## 2021-07-12 RX ADMIN — HEPARIN SODIUM 1000 UNIT(S)/HR: 5000 INJECTION INTRAVENOUS; SUBCUTANEOUS at 02:16

## 2021-07-12 RX ADMIN — Medication 30 MILLIGRAM(S): at 17:29

## 2021-07-12 RX ADMIN — PANTOPRAZOLE SODIUM 40 MILLIGRAM(S): 20 TABLET, DELAYED RELEASE ORAL at 17:28

## 2021-07-12 RX ADMIN — Medication 30 MILLIGRAM(S): at 13:54

## 2021-07-12 RX ADMIN — Medication 40.4 MICROGRAM(S)/KG/MIN: at 21:13

## 2021-07-12 RX ADMIN — Medication 1: at 05:16

## 2021-07-12 RX ADMIN — Medication 1: at 17:27

## 2021-07-12 RX ADMIN — ERYTHROPOIETIN 20000 UNIT(S): 10000 INJECTION, SOLUTION INTRAVENOUS; SUBCUTANEOUS at 08:42

## 2021-07-12 RX ADMIN — Medication 81 MILLIGRAM(S): at 11:31

## 2021-07-12 RX ADMIN — PANTOPRAZOLE SODIUM 40 MILLIGRAM(S): 20 TABLET, DELAYED RELEASE ORAL at 05:10

## 2021-07-12 RX ADMIN — ATORVASTATIN CALCIUM 40 MILLIGRAM(S): 80 TABLET, FILM COATED ORAL at 21:12

## 2021-07-12 RX ADMIN — MUPIROCIN 1 APPLICATION(S): 20 OINTMENT TOPICAL at 05:11

## 2021-07-12 NOTE — PROGRESS NOTE ADULT - SUBJECTIVE AND OBJECTIVE BOX
EP ATTENDING    tele: AFL, PVCs, no malignant arrhythmias    he denies palpitations, syncope, nor angina, ROS otherwise -         Review of Systems:   Constitutional: [ ] fevers, [ ] chills.   Skin: [ ] dry skin. [ ] rashes.  Psychiatric: [ ] depression, [ ] anxiety.   Gastrointestinal: [ ] BRBPR, [ ] melena.   Neurological: [ ] confusion. [ ] seizures. [ ] shuffling gait.   Ears,Nose,Mouth and Throat: [ ] ear pain [ ] sore throat.   Eyes: [ ] diplopia.   Respiratory: [ ] hemoptysis. [ ] shortness of breath  Cardiovascular: See HPI above  Hematologic/Lymphatic: [ ] anemia. [ ] painful nodes. [ ] prolonged bleeding.   Genitourinary: [ ] hematuria. [ ] flank pain.   Endocrine: [ ] significant change in weight. [ ] intolerance to heat and cold.     Review of systems [ x] otherwise negative, [ ] otherwise unable to obtain    FH: no family history of sudden cardiac death in first degree relatives    SH: [ ] tobacco, [ ] alcohol, [ ] drugs    acetaminophen   Tablet .. 1000 milliGRAM(s) Oral every 6 hours PRN  aspirin  chewable 81 milliGRAM(s) Oral daily  atorvastatin 40 milliGRAM(s) Oral at bedtime  calamine/zinc oxide Lotion 1 Application(s) Topical three times a day PRN  chlorhexidine 2% Cloths 1 Application(s) Topical daily  dextrose 40% Gel 15 Gram(s) Oral once  dextrose 5%. 1000 milliLiter(s) IV Continuous <Continuous>  dextrose 5%. 1000 milliLiter(s) IV Continuous <Continuous>  dextrose 50% Injectable 25 Gram(s) IV Push once  dextrose 50% Injectable 12.5 Gram(s) IV Push once  dextrose 50% Injectable 25 Gram(s) IV Push once  diltiazem    Tablet 30 milliGRAM(s) Oral four times a day  epoetin danilo-epbx (RETACRIT) Injectable 18476 Unit(s) IV Push <User Schedule>  glucagon  Injectable 1 milliGRAM(s) IntraMuscular once  heparin   Injectable 7000 Unit(s) IV Push every 6 hours PRN  heparin   Injectable 3500 Unit(s) IV Push every 6 hours PRN  heparin  Infusion. 1000 Unit(s)/Hr IV Continuous <Continuous>  insulin lispro (ADMELOG) corrective regimen sliding scale   SubCutaneous three times a day before meals  insulin lispro (ADMELOG) corrective regimen sliding scale   SubCutaneous at bedtime  norepinephrine Infusion 0.25 MICROgram(s)/kG/Min IV Continuous <Continuous>  pantoprazole  Injectable 40 milliGRAM(s) IV Push every 12 hours  sodium chloride 0.9%. 1000 milliLiter(s) IV Continuous <Continuous>                            9.0    13.27 )-----------( 218      ( 12 Jul 2021 08:31 )             29.8       07-12    130<L>  |  90<L>  |  33<H>  ----------------------------<  207<H>  5.0   |  19<L>  |  8.49<H>    Ca    9.0      12 Jul 2021 01:49  Phos  5.7     07-12  Mg     2.10     07-12    TPro  5.7<L>  /  Alb  3.0<L>  /  TBili  0.4  /  DBili  x   /  AST  27  /  ALT  8   /  AlkPhos  90  07-12      CARDIAC MARKERS ( 12 Jul 2021 01:49 )  x     / x     / 56 U/L / x     / x      CARDIAC MARKERS ( 11 Jul 2021 06:38 )  x     / x     / x     / x     / 18.1 ng/mL  CARDIAC MARKERS ( 10 Jul 2021 18:49 )  x     / x     / 105 U/L / x     / 23.8 ng/mL  CARDIAC MARKERS ( 10 Jul 2021 03:13 )  x     / x     / 190 U/L / x     / 34.5 ng/mL  CARDIAC MARKERS ( 09 Jul 2021 19:22 )  x     / x     / 221 U/L / x     / x      CARDIAC MARKERS ( 09 Jul 2021 19:19 )  x     / x     / x     / x     / 37.2 ng/mL        T(C): 35.9 (07-12-21 @ 12:00), Max: 36.6 (07-11-21 @ 16:00)  HR: 64 (07-12-21 @ 12:00) (56 - 66)  BP: 120/53 (07-12-21 @ 12:00) (90/59 - 137/47)  RR: 17 (07-12-21 @ 12:00) (12 - 33)  SpO2: 93% (07-12-21 @ 12:00) (88% - 100%)  Wt(kg): --    I&O's Summary    11 Jul 2021 07:01  -  12 Jul 2021 07:00  --------------------------------------------------------  IN: 688.3 mL / OUT: 100 mL / NET: 588.3 mL    12 Jul 2021 07:01  -  12 Jul 2021 13:13  --------------------------------------------------------  IN: 583.5 mL / OUT: 500 mL / NET: 83.5 mL        General: Well nourished in no acute distress. Alert and Oriented * 3.   Head: Normocephalic and atraumatic.   Neck: No JVD. No bruits. Supple. Does not appear to be enlarged.   Cardiovascular: + S1,S2 ; IRR Soft systolic murmur at the left lower sternal border. No rubs noted.    Lungs: CTA b/l. No rhonchi, rales or wheezes.   Abdomen: + BS, soft. Non tender. Non distended. No rebound. No guarding.   Extremities: No clubbing/cyanosis/edema.   Neurologic: Moves all four extremities. Full range of motion.   Skin: Warm and moist. The patient's skin has normal elasticity and good skin turgor.   Psychiatric: Appropriate mood and affect.  Musculoskeletal: Normal range of motion, normal strength      A/P) 69 y/o male PMH CAD s/p CABG, PAF/AFL on coumadin, HTN, hyperlipidemia, DM, ESRD on HD, hypothyroidism a/w sepsis    -continue metoprolol for rate control  -continue coumadin for stroke prevention if no contraindication (f/u GI)  -no further inpatient EP workup needed    -f/u cardiology  -no indication for PPM at this time IV discontinued, cath removed intact

## 2021-07-12 NOTE — PROGRESS NOTE ADULT - ASSESSMENT
70 year old with ESRD, DM, CAD presented with anemia and chest pain    Now with fever and hypotension- developed during hospitalization/ not present at admission.      Newly diagnosed Staph aureus bacteremia    1)Staph bacteremia  -prelim results suggested MSSA  now identified as MRSA- did have vanco dose on 7/9; redosed 7/11  Cefazolin changed to vancomycin  Dose Vancomycin based on daily level     Repeat blood cultures 7/9 with growth in 2 of 4 bottles  Repeat blood culture testing    Check echo    Consider imaging LS spine  Monitor right wrist- "? phlebitis as focus      2) Fever  Likely due to bacteremia  Monitor    3) Pleural effusion  S/p thoracentesis- appears transudative but follow up Culture    4) Leukocytosis  Continue to monitor    Plan dw ICU

## 2021-07-12 NOTE — PROGRESS NOTE ADULT - ASSESSMENT
69 yo M with PMhx of CAD s/p CABG (course at that time complicated by pleural effusion requiring chest tubes), ESRD on HD (MWF), DMII, HTN, afib on coumadin, and anemia presented from [facility] for evaluation of chest pain and GIB. Renal following for ESRD Mx.    ESRD  Maintenance schedule MWF  K, vol-ok  hyponatremia, M acidosis noted  currently bp stable, on iv pressor.   s/p bedside HD earlier today, rx sheet reviewed, net uf 100ml removed, low bath temp. was uneventful  Renal diet  dose all meds for ESRD    h/o HTN, now hypotensive/sepsis, on norepinephrine drip. f/u cxs. Mx per ICU  On abx, f/u BCx. s/p L plueral pigtail catheter. chest tube  on Cefazolin, renally dosed. abxs per ID  off metoprolol, now on diltiazem     Anemia in CKD+GIB- Hb <goal  c/w Epo 20k tiw with HD  monitor h/h, neg FOBT  GI following, No plans for cscope.     Renal osteodystrophy  Phos at goal    labs, chart reviewed    [facility]  Cell - [phone]  Office [phone]  Ans Serv [phone]

## 2021-07-12 NOTE — PROGRESS NOTE ADULT - SUBJECTIVE AND OBJECTIVE BOX
[facility] - S Eugene / Marlena S /D Cruz/ S Narendra/ WILY Macias/ Alexandre Esposito / WILMER Hermosillou/ O Jessi  service -[phone], office [phone]  ---------------------------------------------------------------------------------------------------------------    Patient seen and examined bedside    Subjective and Objective: No overnight events, sob resolved. No complaints today. feeling better    Allergies: lisinopril (Other)  opioid-like analgesics (Other)      Hospital Medications:   MEDICATIONS  (STANDING):  aspirin  chewable 81 milliGRAM(s) Oral daily  atorvastatin 40 milliGRAM(s) Oral at bedtime  chlorhexidine 2% Cloths 1 Application(s) Topical daily  dextrose 40% Gel 15 Gram(s) Oral once  dextrose 5%. 1000 milliLiter(s) (50 mL/Hr) IV Continuous <Continuous>  dextrose 5%. 1000 milliLiter(s) (100 mL/Hr) IV Continuous <Continuous>  dextrose 50% Injectable 25 Gram(s) IV Push once  dextrose 50% Injectable 12.5 Gram(s) IV Push once  dextrose 50% Injectable 25 Gram(s) IV Push once  diltiazem    Tablet 30 milliGRAM(s) Oral four times a day  epoetin danilo-epbx (RETACRIT) Injectable 30830 Unit(s) IV Push <User Schedule>  glucagon  Injectable 1 milliGRAM(s) IntraMuscular once  heparin  Infusion. 1000 Unit(s)/Hr (10 mL/Hr) IV Continuous <Continuous>  insulin lispro (ADMELOG) corrective regimen sliding scale   SubCutaneous three times a day before meals  insulin lispro (ADMELOG) corrective regimen sliding scale   SubCutaneous at bedtime  norepinephrine Infusion 0.25 MICROgram(s)/kG/Min (40.4 mL/Hr) IV Continuous <Continuous>  pantoprazole  Injectable 40 milliGRAM(s) IV Push every 12 hours  sodium chloride 0.9%. 1000 milliLiter(s) (50 mL/Hr) IV Continuous <Continuous>      REVIEW OF SYSTEMS:  CONSTITUTIONAL: No weakness, fevers or chills  EYES/ENT: No visual changes;  No vertigo or throat pain   NECK: No pain or stiffness  RESPIRATORY: No cough, wheezing, hemoptysis; No shortness of breath  CARDIOVASCULAR: No chest pain or palpitations.  GASTROINTESTINAL: No abdominal or epigastric pain. No nausea, vomiting, or hematemesis; No diarrhea or constipation. No melena or hematochezia.  GENITOURINARY: No dysuria, frequency, foamy urine, urinary urgency, incontinence or hematuria  NEUROLOGICAL: No numbness or weakness  SKIN: No itching, burning, rashes, or lesions   VASCULAR: No bilateral lower extremity edema.   All other review of systems is negative unless indicated above.    VITALS:  T(F): 96.6 (07-12-21 @ 12:00), Max: 97.9 (07-11-21 @ 16:00)  HR: 64 (07-12-21 @ 12:00)  BP: 120/53 (07-12-21 @ 12:00)  RR: 17 (07-12-21 @ 12:00)  SpO2: 93% (07-12-21 @ 12:00)  Wt(kg): --    07-11 @ 07:01  -  07-12 @ 07:00  --------------------------------------------------------  IN: 688.3 mL / OUT: 100 mL / NET: 588.3 mL    07-12 @ 07:01  -  07-12 @ 13:31  --------------------------------------------------------  IN: 583.5 mL / OUT: 500 mL / NET: 83.5 mL          PHYSICAL EXAM:  Constitutional: NAD  HEENT: anicteric sclera, oropharynx clear  Neck: No JVD  Respiratory: CTAB, no wheezes, rales or rhonchi  Cardiovascular: S1, S2, RRR  Gastrointestinal: BS+, soft, NT/ND  Extremities: No cyanosis or clubbing. No peripheral edema  Neurological: A/O x 3, no focal deficits  Psychiatric: Normal mood, normal affect  : No CVA tenderness. No wagner.   Skin: No rashes  Vascular Access:    LABS:  07-12    130<L>  |  90<L>  |  33<H>  ----------------------------<  207<H>  5.0   |  19<L>  |  8.49<H>    Ca    9.0      12 Jul 2021 01:49  Phos  5.7     07-12  Mg     2.10     07-12    TPro  5.7<L>  /  Alb  3.0<L>  /  TBili  0.4  /  DBili      /  AST  27  /  ALT  8   /  AlkPhos  90  07-12    Creatinine Trend: 8.49 <--, 8.56 <--, 7.83 <--, 5.72 <--, 6.42 <--, 6.10 <--, TNP <--, 4.65 <--, 7.48 <--, 5.12 <--                        9.0    13.27 )-----------( 218      ( 12 Jul 2021 08:31 )             29.8     Urine Studies:        RADIOLOGY & ADDITIONAL STUDIES:   [facility] - S Eugene / Marlena S /D Cruz/ S Narendra/ WILY Macias/ Alexandre Esposito / WILMER Hermosillou/ O Jessi  service -[phone], office [phone]  ---------------------------------------------------------------------------------------------------------------    Patient seen and examined bedside, in MICU    Subjective and Objective: No overnight events, denied cp/sob. No complaints today.  remains on norepinephrine  drip    Allergies: lisinopril (Other)  opioid-like analgesics (Other)      Hospital Medications:   MEDICATIONS  (STANDING):  aspirin  chewable 81 milliGRAM(s) Oral daily  atorvastatin 40 milliGRAM(s) Oral at bedtime  chlorhexidine 2% Cloths 1 Application(s) Topical daily  dextrose 40% Gel 15 Gram(s) Oral once  dextrose 5%. 1000 milliLiter(s) (50 mL/Hr) IV Continuous <Continuous>  dextrose 5%. 1000 milliLiter(s) (100 mL/Hr) IV Continuous <Continuous>  dextrose 50% Injectable 25 Gram(s) IV Push once  dextrose 50% Injectable 12.5 Gram(s) IV Push once  dextrose 50% Injectable 25 Gram(s) IV Push once  diltiazem    Tablet 30 milliGRAM(s) Oral four times a day  epoetin danilo-epbx (RETACRIT) Injectable 80162 Unit(s) IV Push <User Schedule>  glucagon  Injectable 1 milliGRAM(s) IntraMuscular once  heparin  Infusion. 1000 Unit(s)/Hr (10 mL/Hr) IV Continuous <Continuous>  insulin lispro (ADMELOG) corrective regimen sliding scale   SubCutaneous three times a day before meals  insulin lispro (ADMELOG) corrective regimen sliding scale   SubCutaneous at bedtime  norepinephrine Infusion 0.25 MICROgram(s)/kG/Min (40.4 mL/Hr) IV Continuous <Continuous>  pantoprazole  Injectable 40 milliGRAM(s) IV Push every 12 hours  sodium chloride 0.9%. 1000 milliLiter(s) (50 mL/Hr) IV Continuous <Continuous>    VITALS:  T(F): 96.6 (07-12-21 @ 12:00), Max: 97.9 (07-11-21 @ 16:00)  HR: 64 (07-12-21 @ 12:00)  BP: 120/53 (07-12-21 @ 12:00)  RR: 17 (07-12-21 @ 12:00)  SpO2: 93% (07-12-21 @ 12:00)  Wt(kg): --    07-11 @ 07:01  -  07-12 @ 07:00  --------------------------------------------------------  IN: 688.3 mL / OUT: 100 mL / NET: 588.3 mL    07-12 @ 07:01  -  07-12 @ 13:31  --------------------------------------------------------  IN: 583.5 mL / OUT: 500 mL / NET: 83.5 mL      PHYSICAL EXAM:  Constitutional: NAD  HEENT: anicteric sclera  Neck: No JVD  Respiratory: CTAB, no wheezes, rales or rhonchi.   Cardiovascular: S1, S2, RRR  Gastrointestinal: BS+, soft, NT/ND  Extremities: trace peripheral edema  Neurological: drowsy, arousable, not very alert  Psychiatric: Normal mood, normal affect  : No wagner.   Vascular Access: WES avf+thrill     LABS:  07-12    130<L>  |  90<L>  |  33<H>  ----------------------------<  207<H>  5.0   |  19<L>  |  8.49<H>    Ca    9.0      12 Jul 2021 01:49  Phos  5.7     07-12  Mg     2.10     07-12    TPro  5.7<L>  /  Alb  3.0<L>  /  TBili  0.4  /  DBili      /  AST  27  /  ALT  8   /  AlkPhos  90  07-12    Creatinine Trend: 8.49 <--, 8.56 <--, 7.83 <--, 5.72 <--, 6.42 <--, 6.10 <--, TNP <--, 4.65 <--, 7.48 <--, 5.12 <--                        9.0    13.27 )-----------( 218      ( 12 Jul 2021 08:31 )             29.8     Urine Studies:        RADIOLOGY & ADDITIONAL STUDIES:

## 2021-07-12 NOTE — PROGRESS NOTE ADULT - SUBJECTIVE AND OBJECTIVE BOX
CARDIOLOGY ATTENDING    tele - atrial flutter    he denies chest pain, reports mild SOB in bed.  Eating lunch, in good spirits    Review of Systems:   Constitutional: [ ] fevers, [ ] chills.   Skin: [ ] dry skin. [ ] rashes.  Psychiatric: [ ] depression, [ ] anxiety.   Gastrointestinal: [ ] BRBPR, [ ] melena.   Neurological: [ ] confusion. [ ] seizures. [ ] shuffling gait.   Ears,Nose,Mouth and Throat: [ ] ear pain [ ] sore throat.   Eyes: [ ] diplopia.   Respiratory: [ ] hemoptysis. [ ] shortness of breath  Cardiovascular: See HPI above  Hematologic/Lymphatic: [ ] anemia. [ ] painful nodes. [ ] prolonged bleeding.   Genitourinary: [ ] hematuria. [ ] flank pain.   Endocrine: [ ] significant change in weight. [ ] intolerance to heat and cold.     Review of systems [x ] otherwise negative, [ ] otherwise unable to obtain    FH: no family history of sudden cardiac death in first degree relatives    SH: [ ] tobacco, [ ] alcohol, [ ] drugs    acetaminophen   Tablet .. 1000 milliGRAM(s) Oral every 6 hours PRN  aspirin  chewable 81 milliGRAM(s) Oral daily  atorvastatin 40 milliGRAM(s) Oral at bedtime  calamine/zinc oxide Lotion 1 Application(s) Topical three times a day PRN  chlorhexidine 2% Cloths 1 Application(s) Topical daily  dextrose 40% Gel 15 Gram(s) Oral once  dextrose 5%. 1000 milliLiter(s) IV Continuous <Continuous>  dextrose 5%. 1000 milliLiter(s) IV Continuous <Continuous>  dextrose 50% Injectable 25 Gram(s) IV Push once  dextrose 50% Injectable 12.5 Gram(s) IV Push once  dextrose 50% Injectable 25 Gram(s) IV Push once  diltiazem    Tablet 30 milliGRAM(s) Oral four times a day  epoetin danilo-epbx (RETACRIT) Injectable 03091 Unit(s) IV Push <User Schedule>  glucagon  Injectable 1 milliGRAM(s) IntraMuscular once  heparin   Injectable 7000 Unit(s) IV Push every 6 hours PRN  heparin   Injectable 3500 Unit(s) IV Push every 6 hours PRN  heparin  Infusion. 1000 Unit(s)/Hr IV Continuous <Continuous>  insulin lispro (ADMELOG) corrective regimen sliding scale   SubCutaneous three times a day before meals  insulin lispro (ADMELOG) corrective regimen sliding scale   SubCutaneous at bedtime  norepinephrine Infusion 0.25 MICROgram(s)/kG/Min IV Continuous <Continuous>  pantoprazole  Injectable 40 milliGRAM(s) IV Push every 12 hours  sodium chloride 0.9%. 1000 milliLiter(s) IV Continuous <Continuous>                          9.0    13.27 )-----------( 218      ( 12 Jul 2021 08:31 )             29.8       130<L>  |  90<L>  |  33<H>  ----------------------------<  207<H>  5.0   |  19<L>  |  8.49<H>    Ca    9.0      12 Jul 2021 01:49  Phos  5.7     07-12  Mg     2.10     07-12    TPro  5.7<L>  /  Alb  3.0<L>  /  TBili  0.4  /  DBili  x   /  AST  27  /  ALT  8   /  AlkPhos  90  07-12      CARDIAC MARKERS ( 12 Jul 2021 01:49 )  x     / x     / 56 U/L / x     / x      CARDIAC MARKERS ( 11 Jul 2021 06:38 )  x     / x     / x     / x     / 18.1 ng/mL  CARDIAC MARKERS ( 10 Jul 2021 18:49 )  x     / x     / 105 U/L / x     / 23.8 ng/mL  CARDIAC MARKERS ( 10 Jul 2021 03:13 )  x     / x     / 190 U/L / x     / 34.5 ng/mL  CARDIAC MARKERS ( 09 Jul 2021 19:22 )  x     / x     / 221 U/L / x     / x      CARDIAC MARKERS ( 09 Jul 2021 19:19 )  x     / x     / x     / x     / 37.2 ng/mL  CARDIAC MARKERS ( 09 Jul 2021 13:04 )  x     / x     / x     / x     / 44.8 ng/mL      T(C): 35.9 (07-12-21 @ 12:00), Max: 36.6 (07-11-21 @ 16:00)  HR: 64 (07-12-21 @ 12:00) (56 - 66)  BP: 120/53 (07-12-21 @ 12:00) (90/59 - 137/47)  RR: 17 (07-12-21 @ 12:00) (12 - 33)  SpO2: 93% (07-12-21 @ 12:00) (88% - 100%)      General: no acute distress.   Head: Normocephalic and atraumatic.   Neck: No JVD. No bruits. Supple. Does not appear to be enlarged.   Cardiovascular: + S1,S2 ; RRR Soft systolic murmur at the left lower sternal border. No rubs noted.    Lungs: decreased BS BL  Abdomen: + BS, soft. Non tender. Non distended. No rebound. No guarding.   Extremities: No clubbing/cyanosis/edema.   Neurologic: unable to assess  Skin: Warm and moist. The patient's skin has normal elasticity and good skin turgor.   Psychiatric: Appropriate mood and affect.  Musculoskeletal: unable to assess    DATA  < from: Transthoracic Echocardiogram (07.07.21 @ 18:17) >  ------------------------------------------------------------------------  CONCLUSIONS:  1. Mitral annular calcification, otherwise normal mitral  valve. Minimal mitral regurgitation.  2. Endocardium not well visualized; grossly normal left  ventricular systolic function.  3. Right ventricular enlargement with decreased right  ventricular systolic function.  4. Inadequate tricuspid regurgitation Doppler envelope  precludes estimation of RVSP.  ------------------------------------------------------------------------  Confirmed on  7/7/2021 - 21:16:20 by Osvaldo Bertrand M.D.,  [facility], [facility]    < end of copied text >        A/P) 69 y/o male PMH CAD s/p CABG and old PCI, AFL x 1 year on coumadin, HTN, hyperlipidemia, DM, ESRD on HD, and hypothyroidism admitted with septic shock    -EP following for Aflutter/ EKG changes  -supportive are as per MICU  -Abx per ID  -TTE noted, ?need for TRUMAN once stable, will D/W ID  -elevated Trop T with NL CPK, suspect due to demand ischemic in the setting of septic shock  -continue metoprolol for stable CAD with normal LVEF

## 2021-07-12 NOTE — PROGRESS NOTE ADULT - SUBJECTIVE AND OBJECTIVE BOX
Date of Service: 07-12-21 @ 13:25    Patient is a 70y old  Male who presents with a chief complaint of Chest pain and GIB (12 Jul 2021 13:12)      Any change in ROS: pt is alert and awake: still on vasopressors: seen with cards at bedsdie:     MEDICATIONS  (STANDING):  aspirin  chewable 81 milliGRAM(s) Oral daily  atorvastatin 40 milliGRAM(s) Oral at bedtime  chlorhexidine 2% Cloths 1 Application(s) Topical daily  dextrose 40% Gel 15 Gram(s) Oral once  dextrose 5%. 1000 milliLiter(s) (50 mL/Hr) IV Continuous <Continuous>  dextrose 5%. 1000 milliLiter(s) (100 mL/Hr) IV Continuous <Continuous>  dextrose 50% Injectable 25 Gram(s) IV Push once  dextrose 50% Injectable 12.5 Gram(s) IV Push once  dextrose 50% Injectable 25 Gram(s) IV Push once  diltiazem    Tablet 30 milliGRAM(s) Oral four times a day  epoetin danilo-epbx (RETACRIT) Injectable 28495 Unit(s) IV Push <User Schedule>  glucagon  Injectable 1 milliGRAM(s) IntraMuscular once  heparin  Infusion. 1000 Unit(s)/Hr (10 mL/Hr) IV Continuous <Continuous>  insulin lispro (ADMELOG) corrective regimen sliding scale   SubCutaneous three times a day before meals  insulin lispro (ADMELOG) corrective regimen sliding scale   SubCutaneous at bedtime  norepinephrine Infusion 0.25 MICROgram(s)/kG/Min (40.4 mL/Hr) IV Continuous <Continuous>  pantoprazole  Injectable 40 milliGRAM(s) IV Push every 12 hours  sodium chloride 0.9%. 1000 milliLiter(s) (50 mL/Hr) IV Continuous <Continuous>    MEDICATIONS  (PRN):  acetaminophen   Tablet .. 1000 milliGRAM(s) Oral every 6 hours PRN Temp greater or equal to 38C (100.4F), Mild Pain (1 - 3), Moderate Pain (4 - 6)  calamine/zinc oxide Lotion 1 Application(s) Topical three times a day PRN Itching  heparin   Injectable 7000 Unit(s) IV Push every 6 hours PRN For aPTT less than 40  heparin   Injectable 3500 Unit(s) IV Push every 6 hours PRN For aPTT between 40 - 57    Vital Signs Last 24 Hrs  T(C): 35.9 (12 Jul 2021 12:00), Max: 36.6 (11 Jul 2021 16:00)  T(F): 96.6 (12 Jul 2021 12:00), Max: 97.9 (11 Jul 2021 16:00)  HR: 64 (12 Jul 2021 12:00) (56 - 66)  BP: 120/53 (12 Jul 2021 12:00) (90/59 - 137/47)  BP(mean): 69 (12 Jul 2021 12:00) (59 - 79)  RR: 17 (12 Jul 2021 12:00) (12 - 33)  SpO2: 93% (12 Jul 2021 12:00) (88% - 100%)    I&O's Summary    11 Jul 2021 07:01  -  12 Jul 2021 07:00  --------------------------------------------------------  IN: 688.3 mL / OUT: 100 mL / NET: 588.3 mL    12 Jul 2021 07:01  -  12 Jul 2021 13:25  --------------------------------------------------------  IN: 583.5 mL / OUT: 500 mL / NET: 83.5 mL          Physical Exam:   GENERAL: NAD, well-groomed, well-developed  HEENT: KATHY/   Atraumatic, Normocephalic  ENMT: No tonsillar erythema, exudates, or enlargement; Moist mucous membranes, Good dentition, No lesions  NECK: Supple, No JVD, Normal thyroid  CHEST/LUNG: Clear to auscultaion  CVS: Regular rate and rhythm; No murmurs, rubs, or gallops  GI: : Soft, Nontender, Nondistended; Bowel sounds present  NERVOUS SYSTEM:  Alert & awake  EXTREMITIES: --r edema  LYMPH: No lymphadenopathy noted  SKIN: No rashes or lesions  ENDOCRINOLOGY: No Thyromegaly  PSYCH: calm    Labs:  ABG - ( 12 Jul 2021 03:00 )  pH, Arterial: 7.26  pH, Blood: x     /  pCO2: 52    /  pO2: 81    / HCO3: 21    / Base Excess: -3.2  /  SaO2: 95.3            22, 26  CARDIAC MARKERS ( 12 Jul 2021 01:49 )  x     / x     / 56 U/L / x     / x      CARDIAC MARKERS ( 11 Jul 2021 06:38 )  x     / x     / x     / x     / 18.1 ng/mL  CARDIAC MARKERS ( 10 Jul 2021 18:49 )  x     / x     / 105 U/L / x     / 23.8 ng/mL                            9.0    13.27 )-----------( 218      ( 12 Jul 2021 08:31 )             29.8                         9.3    18.00 )-----------( 255      ( 12 Jul 2021 01:47 )             31.8                         9.1    15.61 )-----------( 221      ( 11 Jul 2021 18:38 )             30.2                         9.5    18.48 )-----------( 208      ( 11 Jul 2021 06:36 )             31.8                         9.6    16.90 )-----------( 184      ( 10 Jul 2021 03:13 )             31.6                         9.4    18.84 )-----------( 183      ( 09 Jul 2021 04:17 )             31.3     07-12    130<L>  |  90<L>  |  33<H>  ----------------------------<  207<H>  5.0   |  19<L>  |  8.49<H>  07-11    125<L>  |  91<L>  |  32<H>  ----------------------------<  235<H>  4.7   |  20<L>  |  8.56<H>  07-11    128<L>  |  92<L>  |  28<H>  ----------------------------<  179<H>  4.4   |  20<L>  |  7.83<H>  07-10    132<L>  |  93<L>  |  16  ----------------------------<  169<H>  3.9   |  23  |  5.72<H>  07-09    126<L>  |  87<L>  |  18  ----------------------------<  295<H>  4.0   |  17<L>  |  6.42<H>    Ca    9.0      12 Jul 2021 01:49  Ca    8.4      11 Jul 2021 18:38  Ca    8.6      11 Jul 2021 06:36  Phos  5.7     07-12  Phos  5.7     07-11  Mg     2.10     07-12  Mg     1.80     07-11    TPro  5.7<L>  /  Alb  3.0<L>  /  TBili  0.4  /  DBili  x   /  AST  27  /  ALT  8   /  AlkPhos  90  07-12  TPro  5.6<L>  /  Alb  3.0<L>  /  TBili  0.4  /  DBili  x   /  AST  25  /  ALT  9   /  AlkPhos  88  07-11  TPro  5.9<L>  /  Alb  2.9<L>  /  TBili  0.5  /  DBili  x   /  AST  30  /  ALT  14  /  AlkPhos  88  07-11  TPro  6.0  /  Alb  3.3  /  TBili  0.7  /  DBili  x   /  AST  45<H>  /  ALT  21  /  AlkPhos  77  07-10  TPro  6.0  /  Alb  3.2<L>  /  TBili  0.9  /  DBili  x   /  AST  31  /  ALT  10  /  AlkPhos  66  07-09    CAPILLARY BLOOD GLUCOSE      POCT Blood Glucose.: 176 mg/dL (12 Jul 2021 12:17)  POCT Blood Glucose.: 142 mg/dL (12 Jul 2021 08:25)  POCT Blood Glucose.: 192 mg/dL (12 Jul 2021 04:28)  POCT Blood Glucose.: 168 mg/dL (12 Jul 2021 01:15)  POCT Blood Glucose.: 192 mg/dL (11 Jul 2021 21:14)  POCT Blood Glucose.: 196 mg/dL (11 Jul 2021 16:32)      LIVER FUNCTIONS - ( 12 Jul 2021 01:49 )  Alb: 3.0 g/dL / Pro: 5.7 g/dL / ALK PHOS: 90 U/L / ALT: 8 U/L / AST: 27 U/L / GGT: x           PTT - ( 12 Jul 2021 08:31 )  PTT:86.3 sec    Procalcitonin, Serum: 3.32 ng/mL (07-12 @ 01:49)  Fluid Source PLEURAL  Albumin, Fluid--  Glucose, Vaxdf047 mg/dL  Protein total, Fluid3.2 g/dL  Lacatate Dehydrogenase, Fluid67 U/L  pH, Fluid--  Cytopathology-Non Gyn Report--        RECENT CULTURES:  07-09 @ 08:17 .Blood Blood       Growth in aerobic and anaerobic bottles: Gram Positive Cocci in Clusters           No growth to date.    07-08 @ 20:48 .Body Fluid pleural fluid       polymorphonuclear leukocytes seen  No organisms seen  by cytocentrifuge           No growth    07-07 @ 23:06 .Blood Blood-Venous   PCR    Growth in aerobic bottle: Gram Positive Cocci in Clusters  Growth in anaerobic bottle: Gram Positive Cocci in Clusters    Blood Culture PCR  Methicillin resistant Staphylococcus aureus  Blood Culture PCR     Growth in aerobic and anaerobic bottles: Methicillin resistant  Staphylococcus aureus  ***Blood Panel PCR results on this specimen are available  approximately 3 hours after the Gram stain result.***  Gram stain, PCR, and/or culture results may notalways  correspond due to difference in methodologies.  ************************************************************  This PCR assay was performed by multiplex PCR. This  Assay tests for 66 bacterial and resistance gene targets.  Please refer to the [facility] test directory  at [email or web address] for details.    07-07 @ 23:05 .Blood Blood       Growth in aerobic bottle: Gram Positive Cocci in Clusters  Growth in anaerobic bottle: Gram Positive Cocci in Clusters           Growth in aerobic and anaerobic bottles: Methicillin resistant  Staphylococcus aureus  See previous culture [id number]          RESPIRATORY CULTURES:  r< from: Xray Chest 1 View- PORTABLE-Urgent (Xray Chest 1 View- PORTABLE-Urgent .) (07.12.21 @ 06:37) >  Loculated left pleural effusion with likely associated passive atelectasis is slightly decreased in size.          IMPRESSION:  No pneumothorax seen following left chest tube removal.    Loculated left pleural effusion with likely associated passive atelectasis, slightly decreased in size. Underlying atelectasis of other cause and/or other pathology is not excluded.    Continued focal right upper lung opacity which could be due to subsegmental atelectasis or pneumonia.    New hazy right lower lung opacity which could be due to a small layering right pleural effusion with associated passive atelectasis, atelectasis of other cause, and/or pneumonia.    --- End of Report ---    < end of copied text >          Studies  Chest X-RAY  CT SCAN Chest   Venous Dopplers: LE:   CT Abdomen  Others

## 2021-07-12 NOTE — PROGRESS NOTE ADULT - SUBJECTIVE AND OBJECTIVE BOX
Mainor Bowers  PGY-1 | Internal Medicine      INTERVAL HPI/OVERNIGHT EVENTS:    SUBJECTIVE: Patient seen and examined at bedside.     CONSTITUTIONAL: No weakness, fevers or chills  EYES/ENT: No visual changes;  No vertigo or throat pain   NECK: No pain or stiffness  RESPIRATORY: No cough, wheezing, hemoptysis; No shortness of breath  CARDIOVASCULAR: No chest pain or palpitations  GASTROINTESTINAL: No abdominal or epigastric pain. No nausea, vomiting, or hematemesis; No diarrhea or constipation. No melena or hematochezia.  GENITOURINARY: No dysuria, frequency or hematuria  NEUROLOGICAL: No numbness or weakness  SKIN: No itching, rashes    OBJECTIVE:    VITAL SIGNS:  ICU Vital Signs Last 24 Hrs  T(C): 35.9 (12 Jul 2021 12:00), Max: 36.6 (11 Jul 2021 16:00)  T(F): 96.6 (12 Jul 2021 12:00), Max: 97.9 (11 Jul 2021 16:00)  HR: 64 (12 Jul 2021 12:00) (56 - 66)  BP: 120/53 (12 Jul 2021 12:00) (90/59 - 137/47)  BP(mean): 69 (12 Jul 2021 12:00) (59 - 79)  ABP: --  ABP(mean): --  RR: 17 (12 Jul 2021 12:00) (12 - 33)  SpO2: 93% (12 Jul 2021 12:00) (88% - 100%)        07-11 @ 07:01 - 07-12 @ 07:00  --------------------------------------------------------  IN: 688.3 mL / OUT: 100 mL / NET: 588.3 mL    07-12 @ 07:01  -  07-12 @ 12:55  --------------------------------------------------------  IN: 583.5 mL / OUT: 500 mL / NET: 83.5 mL      CAPILLARY BLOOD GLUCOSE      POCT Blood Glucose.: 176 mg/dL (12 Jul 2021 12:17)      PHYSICAL EXAM:    General: NAD  HEENT: NC/AT; PERRL, clear conjunctiva  Neck: supple  Respiratory: CTA b/l  Cardiovascular: +S1/S2; RRR  Abdomen: soft, NT/ND; +BS x4  Extremities: WWP, 2+ peripheral pulses b/l; no LE edema  Skin: normal color and turgor; no rash  Neurological:    MEDICATIONS:  MEDICATIONS  (STANDING):  aspirin  chewable 81 milliGRAM(s) Oral daily  atorvastatin 40 milliGRAM(s) Oral at bedtime  chlorhexidine 2% Cloths 1 Application(s) Topical daily  dextrose 40% Gel 15 Gram(s) Oral once  dextrose 5%. 1000 milliLiter(s) (50 mL/Hr) IV Continuous <Continuous>  dextrose 5%. 1000 milliLiter(s) (100 mL/Hr) IV Continuous <Continuous>  dextrose 50% Injectable 25 Gram(s) IV Push once  dextrose 50% Injectable 12.5 Gram(s) IV Push once  dextrose 50% Injectable 25 Gram(s) IV Push once  diltiazem    Tablet 30 milliGRAM(s) Oral four times a day  epoetin danilo-epbx (RETACRIT) Injectable 51001 Unit(s) IV Push <User Schedule>  glucagon  Injectable 1 milliGRAM(s) IntraMuscular once  heparin  Infusion. 1000 Unit(s)/Hr (10 mL/Hr) IV Continuous <Continuous>  insulin lispro (ADMELOG) corrective regimen sliding scale   SubCutaneous three times a day before meals  insulin lispro (ADMELOG) corrective regimen sliding scale   SubCutaneous at bedtime  norepinephrine Infusion 0.25 MICROgram(s)/kG/Min (40.4 mL/Hr) IV Continuous <Continuous>  pantoprazole  Injectable 40 milliGRAM(s) IV Push every 12 hours  sodium chloride 0.9%. 1000 milliLiter(s) (50 mL/Hr) IV Continuous <Continuous>    MEDICATIONS  (PRN):  acetaminophen   Tablet .. 1000 milliGRAM(s) Oral every 6 hours PRN Temp greater or equal to 38C (100.4F), Mild Pain (1 - 3), Moderate Pain (4 - 6)  calamine/zinc oxide Lotion 1 Application(s) Topical three times a day PRN Itching  heparin   Injectable 7000 Unit(s) IV Push every 6 hours PRN For aPTT less than 40  heparin   Injectable 3500 Unit(s) IV Push every 6 hours PRN For aPTT between 40 - 57      ALLERGIES:  Allergies    lisinopril (Other)  opioid-like analgesics (Other)    Intolerances        LABS:                        9.0    13.27 )-----------( 218      ( 12 Jul 2021 08:31 )             29.8     07-12    130<L>  |  90<L>  |  33<H>  ----------------------------<  207<H>  5.0   |  19<L>  |  8.49<H>    Ca    9.0      12 Jul 2021 01:49  Phos  5.7     07-12  Mg     2.10     07-12    TPro  5.7<L>  /  Alb  3.0<L>  /  TBili  0.4  /  DBili  x   /  AST  27  /  ALT  8   /  AlkPhos  90  07-12    PTT - ( 12 Jul 2021 08:31 )  PTT:86.3 sec      RADIOLOGY & ADDITIONAL TESTS: Reviewed. Mainor Bowers  PGY-1 | Internal Medicine      INTERVAL HPI/OVERNIGHT EVENTS: Patient tried to get out of bed and syncopized likely due to vasovagal. Chest tube was scheduled to be removed in the AM, however, it was taken out during the night     SUBJECTIVE: Patient seen and examined at bedside.     CONSTITUTIONAL: No weakness, fevers or chills  EYES/ENT: No visual changes;  No vertigo or throat pain   NECK: No pain or stiffness  RESPIRATORY: No cough, wheezing, hemoptysis; No shortness of breath  CARDIOVASCULAR: No chest pain or palpitations  GASTROINTESTINAL: No abdominal or epigastric pain. No nausea, vomiting, or hematemesis; No diarrhea or constipation. No melena or hematochezia.  GENITOURINARY: No dysuria, frequency or hematuria  NEUROLOGICAL: No numbness or weakness  SKIN: No itching, rashes    OBJECTIVE:    VITAL SIGNS:  ICU Vital Signs Last 24 Hrs  T(C): 35.9 (12 Jul 2021 12:00), Max: 36.6 (11 Jul 2021 16:00)  T(F): 96.6 (12 Jul 2021 12:00), Max: 97.9 (11 Jul 2021 16:00)  HR: 64 (12 Jul 2021 12:00) (56 - 66)  BP: 120/53 (12 Jul 2021 12:00) (90/59 - 137/47)  BP(mean): 69 (12 Jul 2021 12:00) (59 - 79)  ABP: --  ABP(mean): --  RR: 17 (12 Jul 2021 12:00) (12 - 33)  SpO2: 93% (12 Jul 2021 12:00) (88% - 100%)        07-11 @ 07:01  -  07-12 @ 07:00  --------------------------------------------------------  IN: 688.3 mL / OUT: 100 mL / NET: 588.3 mL    07-12 @ 07:01  -  07-12 @ 12:55  --------------------------------------------------------  IN: 583.5 mL / OUT: 500 mL / NET: 83.5 mL      CAPILLARY BLOOD GLUCOSE      POCT Blood Glucose.: 176 mg/dL (12 Jul 2021 12:17)      PHYSICAL EXAM:    General: NAD  HEENT: NC/AT; PERRL, clear conjunctiva  Neck: supple  Respiratory: CTA b/l  Cardiovascular: +S1/S2; RRR  Abdomen: soft, NT/ND; +BS x4  Extremities: WWP, 2+ peripheral pulses b/l; no LE edema  Skin: normal color and turgor; no rash  Neurological:    MEDICATIONS:  MEDICATIONS  (STANDING):  aspirin  chewable 81 milliGRAM(s) Oral daily  atorvastatin 40 milliGRAM(s) Oral at bedtime  chlorhexidine 2% Cloths 1 Application(s) Topical daily  dextrose 40% Gel 15 Gram(s) Oral once  dextrose 5%. 1000 milliLiter(s) (50 mL/Hr) IV Continuous <Continuous>  dextrose 5%. 1000 milliLiter(s) (100 mL/Hr) IV Continuous <Continuous>  dextrose 50% Injectable 25 Gram(s) IV Push once  dextrose 50% Injectable 12.5 Gram(s) IV Push once  dextrose 50% Injectable 25 Gram(s) IV Push once  diltiazem    Tablet 30 milliGRAM(s) Oral four times a day  epoetin danilo-epbx (RETACRIT) Injectable 63924 Unit(s) IV Push <User Schedule>  glucagon  Injectable 1 milliGRAM(s) IntraMuscular once  heparin  Infusion. 1000 Unit(s)/Hr (10 mL/Hr) IV Continuous <Continuous>  insulin lispro (ADMELOG) corrective regimen sliding scale   SubCutaneous three times a day before meals  insulin lispro (ADMELOG) corrective regimen sliding scale   SubCutaneous at bedtime  norepinephrine Infusion 0.25 MICROgram(s)/kG/Min (40.4 mL/Hr) IV Continuous <Continuous>  pantoprazole  Injectable 40 milliGRAM(s) IV Push every 12 hours  sodium chloride 0.9%. 1000 milliLiter(s) (50 mL/Hr) IV Continuous <Continuous>    MEDICATIONS  (PRN):  acetaminophen   Tablet .. 1000 milliGRAM(s) Oral every 6 hours PRN Temp greater or equal to 38C (100.4F), Mild Pain (1 - 3), Moderate Pain (4 - 6)  calamine/zinc oxide Lotion 1 Application(s) Topical three times a day PRN Itching  heparin   Injectable 7000 Unit(s) IV Push every 6 hours PRN For aPTT less than 40  heparin   Injectable 3500 Unit(s) IV Push every 6 hours PRN For aPTT between 40 - 57      ALLERGIES:  Allergies    lisinopril (Other)  opioid-like analgesics (Other)    Intolerances        LABS:                        9.0    13.27 )-----------( 218      ( 12 Jul 2021 08:31 )             29.8     07-12    130<L>  |  90<L>  |  33<H>  ----------------------------<  207<H>  5.0   |  19<L>  |  8.49<H>    Ca    9.0      12 Jul 2021 01:49  Phos  5.7     07-12  Mg     2.10     07-12    TPro  5.7<L>  /  Alb  3.0<L>  /  TBili  0.4  /  DBili  x   /  AST  27  /  ALT  8   /  AlkPhos  90  07-12    PTT - ( 12 Jul 2021 08:31 )  PTT:86.3 sec      RADIOLOGY & ADDITIONAL TESTS: Reviewed. Mainor Bowers  PGY-1 | Internal Medicine      INTERVAL HPI/OVERNIGHT EVENTS: Patient tried to get out of bed and syncopized. Chest tube was moved during this event and it was pulled out by the night team. It was going to be removed in the afternoon    SUBJECTIVE: Patient seen and examined at bedside.     CONSTITUTIONAL: No weakness, fevers or chills  EYES/ENT: No visual changes;  No vertigo or throat pain   NECK: No pain or stiffness  RESPIRATORY: No cough, wheezing, hemoptysis; No shortness of breath  CARDIOVASCULAR: No chest pain or palpitations  GASTROINTESTINAL: No abdominal or epigastric pain. No nausea, vomiting, or hematemesis; No diarrhea or constipation. No melena or hematochezia.  GENITOURINARY: No dysuria, frequency or hematuria  NEUROLOGICAL: No numbness or weakness  SKIN: No itching, rashes    OBJECTIVE:    VITAL SIGNS:  ICU Vital Signs Last 24 Hrs  T(C): 35.9 (12 Jul 2021 12:00), Max: 36.6 (11 Jul 2021 16:00)  T(F): 96.6 (12 Jul 2021 12:00), Max: 97.9 (11 Jul 2021 16:00)  HR: 64 (12 Jul 2021 12:00) (56 - 66)  BP: 120/53 (12 Jul 2021 12:00) (90/59 - 137/47)  BP(mean): 69 (12 Jul 2021 12:00) (59 - 79)  RR: 17 (12 Jul 2021 12:00) (12 - 33)  SpO2: 93% (12 Jul 2021 12:00) (88% - 100%)        07-11 @ 07:01  -  07-12 @ 07:00  --------------------------------------------------------  IN: 688.3 mL / OUT: 100 mL / NET: 588.3 mL    07-12 @ 07:01  -  07-12 @ 12:55  --------------------------------------------------------  IN: 583.5 mL / OUT: 500 mL / NET: 83.5 mL      CAPILLARY BLOOD GLUCOSE      POCT Blood Glucose.: 176 mg/dL (12 Jul 2021 12:17)      PHYSICAL EXAM:    General: NAD  HEENT: NC/AT; PERRL, clear conjunctiva  Neck: supple  Respiratory: CTA b/l  Cardiovascular: +S1/S2; RRR  Abdomen: soft, NT/ND; +BS x4  Extremities: WWP, 2+ peripheral pulses b/l; no LE edema  Skin: normal color and turgor; no rash  Neurological: A&Ox3    MEDICATIONS:  MEDICATIONS  (STANDING):  aspirin  chewable 81 milliGRAM(s) Oral daily  atorvastatin 40 milliGRAM(s) Oral at bedtime  chlorhexidine 2% Cloths 1 Application(s) Topical daily  dextrose 40% Gel 15 Gram(s) Oral once  dextrose 5%. 1000 milliLiter(s) (50 mL/Hr) IV Continuous <Continuous>  dextrose 5%. 1000 milliLiter(s) (100 mL/Hr) IV Continuous <Continuous>  dextrose 50% Injectable 25 Gram(s) IV Push once  dextrose 50% Injectable 12.5 Gram(s) IV Push once  dextrose 50% Injectable 25 Gram(s) IV Push once  diltiazem    Tablet 30 milliGRAM(s) Oral four times a day  epoetin danilo-epbx (RETACRIT) Injectable 84652 Unit(s) IV Push <User Schedule>  glucagon  Injectable 1 milliGRAM(s) IntraMuscular once  heparin  Infusion. 1000 Unit(s)/Hr (10 mL/Hr) IV Continuous <Continuous>  insulin lispro (ADMELOG) corrective regimen sliding scale   SubCutaneous three times a day before meals  insulin lispro (ADMELOG) corrective regimen sliding scale   SubCutaneous at bedtime  norepinephrine Infusion 0.25 MICROgram(s)/kG/Min (40.4 mL/Hr) IV Continuous <Continuous>  pantoprazole  Injectable 40 milliGRAM(s) IV Push every 12 hours  sodium chloride 0.9%. 1000 milliLiter(s) (50 mL/Hr) IV Continuous <Continuous>    MEDICATIONS  (PRN):  acetaminophen   Tablet .. 1000 milliGRAM(s) Oral every 6 hours PRN Temp greater or equal to 38C (100.4F), Mild Pain (1 - 3), Moderate Pain (4 - 6)  calamine/zinc oxide Lotion 1 Application(s) Topical three times a day PRN Itching  heparin   Injectable 7000 Unit(s) IV Push every 6 hours PRN For aPTT less than 40  heparin   Injectable 3500 Unit(s) IV Push every 6 hours PRN For aPTT between 40 - 57      ALLERGIES:  Allergies    lisinopril (Other)  opioid-like analgesics (Other)    Intolerances        LABS:                        9.0    13.27 )-----------( 218      ( 12 Jul 2021 08:31 )             29.8     07-12    130<L>  |  90<L>  |  33<H>  ----------------------------<  207<H>  5.0   |  19<L>  |  8.49<H>    Ca    9.0      12 Jul 2021 01:49  Phos  5.7     07-12  Mg     2.10     07-12    TPro  5.7<L>  /  Alb  3.0<L>  /  TBili  0.4  /  DBili  x   /  AST  27  /  ALT  8   /  AlkPhos  90  07-12    PTT - ( 12 Jul 2021 08:31 )  PTT:86.3 sec      RADIOLOGY & ADDITIONAL TESTS: Reviewed. Mainor Bowers  PGY-1 | Internal Medicine      INTERVAL HPI/OVERNIGHT EVENTS: Patient tried to get out of bed and syncopized. Chest tube was moved during this event and it was pulled out by the night team. It was going to be removed in the afternoon today.    SUBJECTIVE: Patient seen and examined at bedside. He was noted to be sitting comfortably and eating. Denies CP, SOB, constipation, diarrhea.     CONSTITUTIONAL: No weakness, fevers or chills  EYES/ENT: No visual changes;  No vertigo or throat pain   NECK: No pain or stiffness  RESPIRATORY: No cough, wheezing, hemoptysis; No shortness of breath  CARDIOVASCULAR: No chest pain or palpitations  GASTROINTESTINAL: No abdominal or epigastric pain. No nausea, vomiting, or hematemesis; No diarrhea or constipation. No melena or hematochezia.  GENITOURINARY: No dysuria, frequency or hematuria  NEUROLOGICAL: No numbness or weakness  SKIN: No itching, rashes    OBJECTIVE:    VITAL SIGNS:  ICU Vital Signs Last 24 Hrs  T(C): 35.9 (12 Jul 2021 12:00), Max: 36.6 (11 Jul 2021 16:00)  T(F): 96.6 (12 Jul 2021 12:00), Max: 97.9 (11 Jul 2021 16:00)  HR: 64 (12 Jul 2021 12:00) (56 - 66)  BP: 120/53 (12 Jul 2021 12:00) (90/59 - 137/47)  BP(mean): 69 (12 Jul 2021 12:00) (59 - 79)  RR: 17 (12 Jul 2021 12:00) (12 - 33)  SpO2: 93% (12 Jul 2021 12:00) (88% - 100%)        07-11 @ 07:01  -  07-12 @ 07:00  --------------------------------------------------------  IN: 688.3 mL / OUT: 100 mL / NET: 588.3 mL    07-12 @ 07:01 - 07-12 @ 12:55  --------------------------------------------------------  IN: 583.5 mL / OUT: 500 mL / NET: 83.5 mL      CAPILLARY BLOOD GLUCOSE      POCT Blood Glucose.: 176 mg/dL (12 Jul 2021 12:17)      PHYSICAL EXAM:    General: NAD  HEENT: NC/AT; PERRL, clear conjunctiva  Neck: supple  Respiratory: CTA b/l  Cardiovascular: +S1/S2; RRR  Abdomen: soft, NT/ND; +BS x4  Extremities: WWP, 2+ peripheral pulses b/l; no LE edema  Skin: normal color and turgor; no rash  Neurological: A&Ox3    MEDICATIONS:  MEDICATIONS  (STANDING):  aspirin  chewable 81 milliGRAM(s) Oral daily  atorvastatin 40 milliGRAM(s) Oral at bedtime  chlorhexidine 2% Cloths 1 Application(s) Topical daily  dextrose 40% Gel 15 Gram(s) Oral once  dextrose 5%. 1000 milliLiter(s) (50 mL/Hr) IV Continuous <Continuous>  dextrose 5%. 1000 milliLiter(s) (100 mL/Hr) IV Continuous <Continuous>  dextrose 50% Injectable 25 Gram(s) IV Push once  dextrose 50% Injectable 12.5 Gram(s) IV Push once  dextrose 50% Injectable 25 Gram(s) IV Push once  diltiazem    Tablet 30 milliGRAM(s) Oral four times a day  epoetin danilo-epbx (RETACRIT) Injectable 12341 Unit(s) IV Push <User Schedule>  glucagon  Injectable 1 milliGRAM(s) IntraMuscular once  heparin  Infusion. 1000 Unit(s)/Hr (10 mL/Hr) IV Continuous <Continuous>  insulin lispro (ADMELOG) corrective regimen sliding scale   SubCutaneous three times a day before meals  insulin lispro (ADMELOG) corrective regimen sliding scale   SubCutaneous at bedtime  norepinephrine Infusion 0.25 MICROgram(s)/kG/Min (40.4 mL/Hr) IV Continuous <Continuous>  pantoprazole  Injectable 40 milliGRAM(s) IV Push every 12 hours  sodium chloride 0.9%. 1000 milliLiter(s) (50 mL/Hr) IV Continuous <Continuous>    MEDICATIONS  (PRN):  acetaminophen   Tablet .. 1000 milliGRAM(s) Oral every 6 hours PRN Temp greater or equal to 38C (100.4F), Mild Pain (1 - 3), Moderate Pain (4 - 6)  calamine/zinc oxide Lotion 1 Application(s) Topical three times a day PRN Itching  heparin   Injectable 7000 Unit(s) IV Push every 6 hours PRN For aPTT less than 40  heparin   Injectable 3500 Unit(s) IV Push every 6 hours PRN For aPTT between 40 - 57      ALLERGIES:  Allergies    lisinopril (Other)  opioid-like analgesics (Other)    Intolerances        LABS:                        9.0    13.27 )-----------( 218      ( 12 Jul 2021 08:31 )             29.8     07-12    130<L>  |  90<L>  |  33<H>  ----------------------------<  207<H>  5.0   |  19<L>  |  8.49<H>    Ca    9.0      12 Jul 2021 01:49  Phos  5.7     07-12  Mg     2.10     07-12    TPro  5.7<L>  /  Alb  3.0<L>  /  TBili  0.4  /  DBili  x   /  AST  27  /  ALT  8   /  AlkPhos  90  07-12    PTT - ( 12 Jul 2021 08:31 )  PTT:86.3 sec      RADIOLOGY & ADDITIONAL TESTS: Reviewed.    < from: Xray Chest 1 View- PORTABLE-Urgent (Xray Chest 1 View- PORTABLE-Urgent .) (07.12.21 @ 06:37) >  IMPRESSION:  No pneumothorax seen following left chest tube removal.    Loculated left pleural effusion with likely associated passive atelectasis, slightly decreased in size. Underlying atelectasis of other cause and/or other pathology is not excluded.    Continued focal right upper lung opacity which could be due to subsegmental atelectasis or pneumonia.    New hazy right lower lung opacity which could be due to a small layering right pleural effusion with associated passive atelectasis, atelectasis of other cause, and/or pneumonia.    < end of copied text >

## 2021-07-12 NOTE — PROGRESS NOTE ADULT - ATTENDING COMMENTS
Critically ill requiring frequent bedside visits. ESRD on HD with MRSA bactermia. Repeat cultures pending. Echo shows no abnormality with valves. On vasopressors for BP support. Continue Vanco by level and supportive care. HD today.

## 2021-07-12 NOTE — PROGRESS NOTE ADULT - SUBJECTIVE AND OBJECTIVE BOX
Subjective: Awake and alert. Undergoing HD. Remains on pressors.    Vital Signs:  Vital Signs Last 24 Hrs  T(C): 36.2 (07-12-21 @ 08:00), Max: 36.6 (07-11-21 @ 16:00)  T(F): 97.1 (07-12-21 @ 08:00), Max: 97.9 (07-11-21 @ 16:00)  HR: 59 (07-12-21 @ 09:00) (56 - 63)  BP: 118/51 (07-12-21 @ 09:00) (90/59 - 137/47)  RR: 21 (07-12-21 @ 09:00) (12 - 33)  SpO2: 98% (07-12-21 @ 09:00) (88% - 100%) on (O2)    Telemetry/Alarms:  General: WN/WD NAD  Neurology: Awake, nonfocal, GRAVES x 4  Eyes: Scleras clear, PERRLA/ EOMI, Gross vision intact  ENT:Gross hearing intact, grossly patent pharynx, no stridor  Neck: Neck supple, trachea midline, No JVD,   Respiratory: Decr BS Left base, No wheezing, rales, rhonchi  CV: RRR, S1S2, no murmurs, rubs or gallops  Abdominal: Distended; Soft, NT, ND +BS,   Extremities: Mild edema, + peripheral pulses  Skin: No Rashes, Hematoma, Ecchymosis  Lymphatic: No Neck, axilla, groin LAD  Psych: Oriented x 3, normal affect  Incisions:   Tubes:  Relevant labs, radiology and Medications reviewed                        9.0    13.27 )-----------( 218      ( 12 Jul 2021 08:31 )             29.8     07-12    130<L>  |  90<L>  |  33<H>  ----------------------------<  207<H>  5.0   |  19<L>  |  8.49<H>    Ca    9.0      12 Jul 2021 01:49  Phos  5.7     07-12  Mg     2.10     07-12    TPro  5.7<L>  /  Alb  3.0<L>  /  TBili  0.4  /  DBili  x   /  AST  27  /  ALT  8   /  AlkPhos  90  07-12    PTT - ( 12 Jul 2021 08:31 )  PTT:86.3 sec  ABG:  CXR:  MEDICATIONS  (STANDING):  aspirin  chewable 81 milliGRAM(s) Oral daily  atorvastatin 40 milliGRAM(s) Oral at bedtime  ceFAZolin   IVPB 1000 milliGRAM(s) IV Intermittent every 24 hours  chlorhexidine 2% Cloths 1 Application(s) Topical daily  dextrose 40% Gel 15 Gram(s) Oral once  dextrose 5%. 1000 milliLiter(s) (50 mL/Hr) IV Continuous <Continuous>  dextrose 5%. 1000 milliLiter(s) (100 mL/Hr) IV Continuous <Continuous>  dextrose 50% Injectable 25 Gram(s) IV Push once  dextrose 50% Injectable 12.5 Gram(s) IV Push once  dextrose 50% Injectable 25 Gram(s) IV Push once  epoetin danilo-epbx (RETACRIT) Injectable 33369 Unit(s) IV Push <User Schedule>  glucagon  Injectable 1 milliGRAM(s) IntraMuscular once  heparin  Infusion. 1000 Unit(s)/Hr (10 mL/Hr) IV Continuous <Continuous>  insulin lispro (ADMELOG) corrective regimen sliding scale   SubCutaneous three times a day before meals  insulin lispro (ADMELOG) corrective regimen sliding scale   SubCutaneous at bedtime  metoprolol tartrate 25 milliGRAM(s) Oral two times a day  norepinephrine Infusion 0.25 MICROgram(s)/kG/Min (40.4 mL/Hr) IV Continuous <Continuous>  pantoprazole  Injectable 40 milliGRAM(s) IV Push every 12 hours  sodium chloride 0.9%. 1000 milliLiter(s) (50 mL/Hr) IV Continuous <Continuous>    MEDICATIONS  (PRN):  acetaminophen   Tablet .. 1000 milliGRAM(s) Oral every 6 hours PRN Temp greater or equal to 38C (100.4F), Mild Pain (1 - 3), Moderate Pain (4 - 6)  calamine/zinc oxide Lotion 1 Application(s) Topical three times a day PRN Itching  heparin   Injectable 7000 Unit(s) IV Push every 6 hours PRN For aPTT less than 40  heparin   Injectable 3500 Unit(s) IV Push every 6 hours PRN For aPTT between 40 - 57    Pertinent Physical Exam  I&O's Summary    11 Jul 2021 07:01  -  12 Jul 2021 07:00  --------------------------------------------------------  IN: 662.4 mL / OUT: 100 mL / NET: 562.4 mL    12 Jul 2021 07:01  -  12 Jul 2021 09:12  --------------------------------------------------------  IN: 71 mL / OUT: 0 mL / NET: 71 mL        Assessment  70y Male  w/ PAST MEDICAL & SURGICAL HISTORY:  HTN (Hypertension)    DM (Diabetes Mellitus)  x 4 yrs without N/N/R    CAD (Coronary Artery Disease)    Hypercholesterolemia    Coronary Stent  CABG in 2019    Heart Attack  2/1/07 with subsequent stent    H/O: CVA  after cardiac stent placed 12/15/09 -no residual    Hyperthyroidism    Stented coronary artery  total 15 stents from 4790-1809    ESRD (end stage renal disease) on dialysis  [facility]    [facility]  2006 and 2008    S/P cataract extraction    Hemodialysis access, AV graft  5/2015, L arm    S/P CABG x 4     Patient is a 70y old  Male who presents with a chief complaint of Chest pain and GIB (11 Jul 2021 17:10).      PLAN  Left pleural catheter removed by Medical team this a. m.  Pulm: Encourage coughing, deep breathing and use of incentive spirometry.  Aggressive chest PT. Daily CXR.   Cardio: Cont HD support. Monitor telemetry/alarms.

## 2021-07-12 NOTE — PROGRESS NOTE ADULT - ASSESSMENT
70 year old male with possible GI bleed     r/o GI bleed:  negative occult w/o overt gi bleeding (yellow/brown stools)  Monitor CBC and Keep hemoglobin > 8 given cardiac issues   PPI BID w/Maalox PRN    Avoid NSAIDS; No GI objection to ASA or Heparin gtt   Zofran PRN for Nausea  no urgent endoscopic evaluation as H/H stable and negative occult  possible inpatient scope if becomes necessary when fully optimized by medicine and cardio     Pleural Effusion:  s/p chest tube   cont care per CTSx; care appreciated    CAD  Care per cardiology and MICU appreciated       Advanced care planning was discussed with patient.  Advanced care planning forms were reviewed and discussed.  Risks, benefits and alternatives of gastroenterologic procedures were discussed in detail and all questions were answered.  30 minutes spent.

## 2021-07-12 NOTE — PROGRESS NOTE ADULT - SUBJECTIVE AND OBJECTIVE BOX
INTERVAL HPI/OVERNIGHT EVENTS:    in micu getting HD   having brown stools, most recent yesterday       MEDICATIONS  (STANDING):  aspirin  chewable 81 milliGRAM(s) Oral daily  atorvastatin 40 milliGRAM(s) Oral at bedtime  chlorhexidine 2% Cloths 1 Application(s) Topical daily  dextrose 40% Gel 15 Gram(s) Oral once  dextrose 5%. 1000 milliLiter(s) (50 mL/Hr) IV Continuous <Continuous>  dextrose 5%. 1000 milliLiter(s) (100 mL/Hr) IV Continuous <Continuous>  dextrose 50% Injectable 25 Gram(s) IV Push once  dextrose 50% Injectable 12.5 Gram(s) IV Push once  dextrose 50% Injectable 25 Gram(s) IV Push once  diltiazem    Tablet 30 milliGRAM(s) Oral four times a day  epoetin danilo-epbx (RETACRIT) Injectable 67637 Unit(s) IV Push <User Schedule>  glucagon  Injectable 1 milliGRAM(s) IntraMuscular once  heparin  Infusion. 1000 Unit(s)/Hr (10 mL/Hr) IV Continuous <Continuous>  insulin lispro (ADMELOG) corrective regimen sliding scale   SubCutaneous three times a day before meals  insulin lispro (ADMELOG) corrective regimen sliding scale   SubCutaneous at bedtime  norepinephrine Infusion 0.25 MICROgram(s)/kG/Min (40.4 mL/Hr) IV Continuous <Continuous>  pantoprazole  Injectable 40 milliGRAM(s) IV Push every 12 hours  sodium chloride 0.9%. 1000 milliLiter(s) (50 mL/Hr) IV Continuous <Continuous>    MEDICATIONS  (PRN):  acetaminophen   Tablet .. 1000 milliGRAM(s) Oral every 6 hours PRN Temp greater or equal to 38C (100.4F), Mild Pain (1 - 3), Moderate Pain (4 - 6)  calamine/zinc oxide Lotion 1 Application(s) Topical three times a day PRN Itching  heparin   Injectable 7000 Unit(s) IV Push every 6 hours PRN For aPTT less than 40  heparin   Injectable 3500 Unit(s) IV Push every 6 hours PRN For aPTT between 40 - 57      Allergies    lisinopril (Other)  opioid-like analgesics (Other)    Intolerances        Review of Systems:    General:  No wt loss, fevers, chills, night sweats, fatigue   Eyes:  Good vision, no reported pain  ENT:  No sore throat, pain, runny nose, dysphagia  CV:  No pain, palpitations, hypo/hypertension  Resp:  No dyspnea, cough, tachypnea, wheezing  GI:  No pain, No nausea, No vomiting, No diarrhea, No constipation, No weight loss, No fever, No pruritis, No rectal bleeding, No melena, No dysphagia  :  No pain, bleeding, incontinence, nocturia  Muscle:  No pain, weakness  Neuro:  No weakness, tingling, memory problems  Psych:  No fatigue, insomnia, mood problems, depression  Endocrine:  No polyuria, polydypsia, cold/heat intolerance  Heme:  No petechiae, ecchymosis, easy bruisability  Skin:  No rash, tattoos, scars, edema      Vital Signs Last 24 Hrs  T(C): 35.9 (12 Jul 2021 12:00), Max: 36.6 (11 Jul 2021 16:00)  T(F): 96.6 (12 Jul 2021 12:00), Max: 97.9 (11 Jul 2021 16:00)  HR: 64 (12 Jul 2021 12:00) (56 - 66)  BP: 120/53 (12 Jul 2021 12:00) (90/59 - 137/47)  BP(mean): 69 (12 Jul 2021 12:00) (59 - 79)  RR: 17 (12 Jul 2021 12:00) (12 - 33)  SpO2: 93% (12 Jul 2021 12:00) (88% - 100%)    PHYSICAL EXAM:    Constitutional: NAD  HEENT: EOMI, throat clear  Neck: No LAD, supple  Respiratory: CTA and P  Cardiovascular: S1 and S2, RRR, no M  Gastrointestinal: BS+, soft, NT/ND, neg HSM,  Extremities: No peripheral edema, neg clubbing, cyanosis  Vascular: 2+ peripheral pulses  Neurological: A/O x 3, no focal deficits  Psychiatric: Normal mood, normal affect  Skin: No rashes      LABS:                        9.0    13.27 )-----------( 218      ( 12 Jul 2021 08:31 )             29.8     07-12    130<L>  |  90<L>  |  33<H>  ----------------------------<  207<H>  5.0   |  19<L>  |  8.49<H>    Ca    9.0      12 Jul 2021 01:49  Phos  5.7     07-12  Mg     2.10     07-12    TPro  5.7<L>  /  Alb  3.0<L>  /  TBili  0.4  /  DBili  x   /  AST  27  /  ALT  8   /  AlkPhos  90  07-12    PTT - ( 12 Jul 2021 08:31 )  PTT:86.3 sec      RADIOLOGY & ADDITIONAL TESTS:

## 2021-07-12 NOTE — PROGRESS NOTE ADULT - ASSESSMENT
71 yo M with PMhx of CAD s/p CABG (course at that time complicated by pleural effusion requiring chest tubes), ESRD on HD (MWF), DMII, HTN, afib on coumadin, and anemia presented from [facility] for evaluation of chest pain and GIB.       left sided pleural effusion: loculated and chronic:   CAD:  CABG:  ESRD:   DM:  HTN:  A fibrillation:   GI Bleed    7/7:    left sided pleural effusion: loculated and chronic: he has chr loculated pleural effusion on left side: He had chest tube placement in last June 2020 at another hospital: no chemistry is available but he had negative cytology at that time: This time the effusion seems slightly worse and has loculated effusion with pleural thickening: heis not SOB andhasbeen on roomair: I think , it at all, if this effusion needs to be dealt with : it is probably vats: will contact thoracic surgery : etiology not very clear as there is no previous chemistry: coult be exudative or transudatve: he is on HD and now it is a chr christiano effusion   CAD: cont per cards  CABG: cont current meds  ESRD: on HD   DM: monitor and control  HTN: controlled  A fibrillation: off ac:   GI Bleed: per gi :  DW pt and t eam1    7/8:    left sided pleural effusion:: fever: s/p left sided chest tube placement: cultures sent: however with pr criteria it seems transudative await serum LDH but pleural fluid LDH is low:  already started on broad spectrum antibiotics ID to see: await cultures:   CAD: cont per cards  CABG: cont current meds  ESRD: on HD   DM: monitor and control  HTN: controlled  A fibrillation: off ac:   GI Bleed: per gi :  DW pmd    7/9:    left sided pleural effusion:: fever: s/p left sided chest tube placement: cultures sent: however with pr criteria it seems borderline transudative and transudative by LDH criteria: the vulutres ahve been negative so far:  already started on broad spectrum antibiotics ID to see: await cultures: ? source of spesis  CAD: cont per cards  CABG: cont current meds  ESRD: on HD   DM: monitor and control  HTN: in shock: now on vasopressors:   A fibrillation: off ac:   GI Bleed: per gi :  DW pmd  micu team    7/11:      left sided pleural effusion:: fever: s/p left sided chest tube placement: cultures sent: however with pr criteria it seems borderline transudative and transudative by LDH criteria: the cultures have been negative so far:  already started on broad spectrum antibiotics : Has MSSA bactermia  CAD: cont per cards: still on vasopressors: wean as tolerated ? needs molly ro tule out IE : would defer to cards:   CABG: cont current meds  ESRD: on HD   DM: monitor and control  HTN: in shock: now on vasopressors:   A fibrillation: off ac:   GI Bleed: per gi :  DW pmd  micu team    7/12:    left sided pleural effusion:: fever: s/p left sided chest tube placement:- now removed: cultures sent: however with pr criteria it seems borderline transudative and transudative by LDH criteria: the cultures have been negative so far:  already started on broad spectrum antibiotics : Has MRSA bactermia: vanco restarted yesterday ? echo/molly  Ac hypercarbic resp fialure: needs to be watched closelyin MICU : needs bipap may need intubation" At this time he is alert and awake: and responds to simple questions:    CAD: cont per cards: still on vasopressors: wean as tolerated ? needs molly ro tule out IE : would defer to cards:   CABG: cont current meds  ESRD: on HD   DM: monitor and control  HTN: in shock: now on vasopressors:   A fibrillation: off ac:   GI Bleed: per gi :  DW pmd  cards and bedstaff

## 2021-07-12 NOTE — PROGRESS NOTE ADULT - SUBJECTIVE AND OBJECTIVE BOX
Follow Up:      Inverval History/ROS:Patient is a 70y old  Male who presents with a chief complaint of Chest pain and GIB (12 Jul 2021 09:11)    No fever  No events  Getting HD     Allergies    lisinopril (Other)  opioid-like analgesics (Other)    Intolerances        ANTIMICROBIALS:      OTHER MEDS:  acetaminophen   Tablet .. 1000 milliGRAM(s) Oral every 6 hours PRN  aspirin  chewable 81 milliGRAM(s) Oral daily  atorvastatin 40 milliGRAM(s) Oral at bedtime  calamine/zinc oxide Lotion 1 Application(s) Topical three times a day PRN  chlorhexidine 2% Cloths 1 Application(s) Topical daily  dextrose 40% Gel 15 Gram(s) Oral once  dextrose 5%. 1000 milliLiter(s) IV Continuous <Continuous>  dextrose 5%. 1000 milliLiter(s) IV Continuous <Continuous>  dextrose 50% Injectable 25 Gram(s) IV Push once  dextrose 50% Injectable 12.5 Gram(s) IV Push once  dextrose 50% Injectable 25 Gram(s) IV Push once  epoetin danilo-epbx (RETACRIT) Injectable 89226 Unit(s) IV Push <User Schedule>  glucagon  Injectable 1 milliGRAM(s) IntraMuscular once  heparin   Injectable 7000 Unit(s) IV Push every 6 hours PRN  heparin   Injectable 3500 Unit(s) IV Push every 6 hours PRN  heparin  Infusion. 1000 Unit(s)/Hr IV Continuous <Continuous>  insulin lispro (ADMELOG) corrective regimen sliding scale   SubCutaneous three times a day before meals  insulin lispro (ADMELOG) corrective regimen sliding scale   SubCutaneous at bedtime  metoprolol tartrate 25 milliGRAM(s) Oral two times a day  norepinephrine Infusion 0.25 MICROgram(s)/kG/Min IV Continuous <Continuous>  pantoprazole  Injectable 40 milliGRAM(s) IV Push every 12 hours  sodium chloride 0.9%. 1000 milliLiter(s) IV Continuous <Continuous>      Vital Signs Last 24 Hrs  T(C): 36.2 (12 Jul 2021 08:00), Max: 36.6 (11 Jul 2021 16:00)  T(F): 97.1 (12 Jul 2021 08:00), Max: 97.9 (11 Jul 2021 16:00)  HR: 59 (12 Jul 2021 09:00) (56 - 63)  BP: 118/51 (12 Jul 2021 09:00) (90/59 - 137/47)  BP(mean): 68 (12 Jul 2021 09:00) (59 - 79)  RR: 21 (12 Jul 2021 09:00) (12 - 33)  SpO2: 98% (12 Jul 2021 09:00) (88% - 100%)    PHYSICAL EXAM:  General: [x ] non-toxic  HEAD/EYES: [ ] PERRL [ x] white sclera [ ] icterus  ENT:  [ ] normal [x ] supple [ ] thrush [ ] pharyngeal exudate  Cardiovascular:   [ ] murmur x[ ] normal [ ] PPM/AICD  Respiratory:  [x ] clear to ausculation bilaterally  GI:  [x ] soft, non-tender, normal bowel sounds  :  [ ] wagner [ ] no CVA tenderness   Musculoskeletal:  [ ] no synovitis  Neurologic:  [ ] non-focal exam   Skin:  [x ] no rash  Lymph: [ ] no lymphadenopathy  Psychiatric:  [ ] appropriate affect [ x] alert & oriented  Lines:  [ x] no phlebitis [ ] central line                                9.0    13.27 )-----------( 218      ( 12 Jul 2021 08:31 )             29.8       07-12    130<L>  |  90<L>  |  33<H>  ----------------------------<  207<H>  5.0   |  19<L>  |  8.49<H>    Ca    9.0      12 Jul 2021 01:49  Phos  5.7     07-12  Mg     2.10     07-12    TPro  5.7<L>  /  Alb  3.0<L>  /  TBili  0.4  /  DBili  x   /  AST  27  /  ALT  8   /  AlkPhos  90  07-12          MICROBIOLOGY:Culture Results:   No growth to date. (07-09-21 @ 08:17)  Culture Results:   Growth in aerobic and anaerobic bottles: Methicillin resistant  Staphylococcus aureus  See previous culture [id number] (07-09-21 @ 08:17)  Culture Results:   No growth (07-08-21 @ 20:48)  Culture Results:   Growth in aerobic and anaerobic bottles: Methicillin resistant  Staphylococcus aureus  ***Blood Panel PCR results on this specimen are available  approximately 3 hours after the Gram stain result.***  Gram stain, PCR, and/or culture results may notalways  correspond due to difference in methodologies.  ************************************************************  This PCR assay was performed by multiplex PCR. This  Assay tests for 66 bacterial and resistance gene targets.  Please refer to the [facility] test directory  at [email or web address] for details. (07-07-21 @ 23:06)  Culture Results:   Growth in aerobic and anaerobic bottles: Methicillin resistant  Staphylococcus aureus  See previous culture [id number] (07-07-21 @ 23:05)      RADIOLOGY:

## 2021-07-12 NOTE — PROGRESS NOTE ADULT - ASSESSMENT
69 yo M with PMhx of CAD s/p CABG (course at that time complicated by pleural effusion requiring chest tubes), ESRD on HD (MWF), DMII, HTN, afib on coumadin, and anemia transferred from [facility] for evaluation of chest pain after HD and GIB s/p 2U PRBC transfusion and aflutter/afib with RVR managed with metoprolol. Admitted to MICU for hypotension dependent on pressors likely 2/2 to sepsis.    #Neuro  - no active issues.  - At times, get confused in the morning but still A&Ox3    #Cardiovascular  - Hypotension: MICU was consulted one day prior to transfer for hypotension responsive to Midodrine and 500cc IV bolus. Today he presented bradycardia and worsening hypotension after placement of chest tube. He was given atropine, Chris and Levophed.  - TTE on 7/7: Right ventricular enlargement with decreased right ventricular systolic function. Inadequate tricuspid regurgitation Doppler envelope precludes estimation of RVSP.    - c/w Levophed wean as tolerated    - monitor HR and BP    - Troponins on increasing trend, latest at 2000, CKMB 34.5. EKG with variable baseline in the setting of baseline aflutter. His cardiology service was contacted and they are aware of the condition. Likely 2/2 chronic condition/ESRD on HD vs Hypotensive event. Will continue to trend and monitor. Patient currently asx.    A. Fib  - on coumadin at home   - 7/11: tried to transition heparin gtt to eliquis but pt is not a candidate due to his acute MIGUEL; back on heparin gtt     #Respiratory  - Loculated Pleural effusions: Chest tube on left hemithorax draining serosanguineous fluid. Currently unclamped.   - pleural fluid cx: (-); transudative.   - currently saturating adequately on NC @ 5L. Wean as tolerated.  - f/u w/ CT surgery on 7/12 for possible chest tube removal as effusion is transudative and cultures were neg and chest tube output has decreased       #GI/Nutrition:  - Diet: Renal diet    - Was C/o abdominal pain, bedside POCUS didn't show ascites. Continue to monitor.       #/Renal  - ESRD: HD on MWF. Most recent Scr 6.10 an increase from the previous labs. Potassium is stable.   - HD 7/9. No volume was eliminated.   - possible HD tomorrow       #Skin  - IV Line: new lines today as pt took lines off during the night.  - HD Access: fistula in left upper arm  - Improving erythematous and tender on right wrist. Continue to monitor. RUE US neg for thrombus      #ID  - S aureus PCR: (+),  MRSA (-) c/w Mupirocin  - BCx on 7/7 now growing MRSA: restarted on vanc (7/11- )  - Get vanc level before HD and vanc by level   - Surveillance blood Cx q48h until neg.      #Endocrine  - DMII: c/w Lispro SS. Monitor glucose.      #Hematologic/DVT ppx  - Heparin gtt      #Ethics  - full code as per wife 71 yo M with PMhx of CAD s/p CABG (course at that time complicated by pleural effusion requiring chest tubes), ESRD on HD (MWF), DMII, HTN, afib on coumadin, and anemia transferred from [facility] for evaluation of chest pain after HD and GIB s/p 2U PRBC transfusion and aflutter/afib with RVR managed with metoprolol. Admitted to MICU for hypotension dependent on pressors likely 2/2 to sepsis.    #Neuro  - no active issues.  - At times, get confused in the morning but still A&Ox3    #Cardiovascular  - Hypotension: MICU was consulted one day prior to transfer for hypotension responsive to Midodrine and 500cc IV bolus. Day of transfer, he presented with bradycardia and worsening hypotension after placement of chest tube. He was given atropine, Chris and Levophed.  - TTE on 7/7: Right ventricular enlargement with decreased right ventricular systolic function. Inadequate tricuspid regurgitation..    - c/w Levophed wean as tolerated.     - Will consider switching to midodrine once HR stable    - monitor HR and BP    - Troponins on increasing trend, latest at 2000, CKMB 34.5. EKG with variable baseline in the setting of baseline aflutter. His cardiology service was contacted and they are aware of the condition. Likely 2/2 chronic condition/ESRD on HD vs Hypotensive event. Will continue to trend and monitor. Patient currently asymptomatic.    A. Fib  - on coumadin at home   - 7/11: tried to transition heparin gtt to eliquis but pt is not a candidate due to his acute MIGUEL; back on heparin gtt   - d/c metoprolol and switch to diltiazem for rate control    #Respiratory  - Loculated Pleural effusions: chest tube removed   - pleural fluid cx: (-); transudative.   - currently saturating adequately on NC @ 6L. Wean as tolerated.  - CXR s/p chest tube removal showed no evidence of atelectasis. Demonstrated loculated left pleural effusion decreased in size and new hazy right lower lung opacity which could be due to a small layering right pleural effusion with associated passive atelectasis, atelectasis of other cause, and/or pneumonia.      #GI/Nutrition:  - Diet: Renal diet    - Was C/o abdominal pain, bedside POCUS didn't show ascites. No abdominal pain at this time. Continue to monitor.       #/Renal  - ESRD: HD on MWF. Most recent Scr 8.49 an increase from the previous labs. Potassium is stable.   - Receiving HD today, will obtain and follow labs after HD      #Skin  - HD Access: fistula in left upper arm  - Improving erythematous and tender on right wrist. Continue to monitor. RUE US neg for thrombus      #ID  - S aureus PCR: (+),  MRSA PCR negative  - BCx on 7/7 now growing MRSA: restarted on vanc (7/11- )  - Get vanc level before HD and vanc by level   - Surveillance blood Cx q48h until neg.  - d/c Cefazolin  - Will remove MRSA precaution as not necessary per ID for MRSA in blood    #Endocrine  - DMII: c/w Lispro SS. Monitor glucose.      #Hematologic/DVT ppx  - Heparin gtt      #Ethics  - full code as per wife

## 2021-07-13 LAB
ALBUMIN SERPL ELPH-MCNC: 3 G/DL — LOW (ref 3.3–5)
ALP SERPL-CCNC: 92 U/L — SIGNIFICANT CHANGE UP (ref 40–120)
ALT FLD-CCNC: 5 U/L — SIGNIFICANT CHANGE UP (ref 4–41)
ANION GAP SERPL CALC-SCNC: 21 MMOL/L — HIGH (ref 7–14)
ANISOCYTOSIS BLD QL: SLIGHT — SIGNIFICANT CHANGE UP
APTT BLD: 69.6 SEC — HIGH (ref 27–36.3)
AST SERPL-CCNC: 24 U/L — SIGNIFICANT CHANGE UP (ref 4–40)
BASOPHILS # BLD AUTO: 0 K/UL — SIGNIFICANT CHANGE UP (ref 0–0.2)
BASOPHILS NFR BLD AUTO: 0 % — SIGNIFICANT CHANGE UP (ref 0–2)
BILIRUB SERPL-MCNC: 0.5 MG/DL — SIGNIFICANT CHANGE UP (ref 0.2–1.2)
BLD GP AB SCN SERPL QL: NEGATIVE — SIGNIFICANT CHANGE UP
BLOOD GAS ARTERIAL COMPREHENSIVE RESULT: SIGNIFICANT CHANGE UP
BUN SERPL-MCNC: 24 MG/DL — HIGH (ref 7–23)
CALCIUM SERPL-MCNC: 9.9 MG/DL — SIGNIFICANT CHANGE UP (ref 8.4–10.5)
CHLORIDE SERPL-SCNC: 92 MMOL/L — LOW (ref 98–107)
CK MB BLD-MCNC: 14.2 % — HIGH (ref 0–2.5)
CK MB CFR SERPL CALC: 7.1 NG/ML — HIGH
CK SERPL-CCNC: 50 U/L — SIGNIFICANT CHANGE UP (ref 30–200)
CO2 SERPL-SCNC: 18 MMOL/L — LOW (ref 22–31)
CREAT SERPL-MCNC: 6.4 MG/DL — HIGH (ref 0.5–1.3)
CULTURE RESULTS: SIGNIFICANT CHANGE UP
EOSINOPHIL # BLD AUTO: 0.74 K/UL — HIGH (ref 0–0.5)
EOSINOPHIL NFR BLD AUTO: 5.3 % — SIGNIFICANT CHANGE UP (ref 0–6)
GIANT PLATELETS BLD QL SMEAR: PRESENT — SIGNIFICANT CHANGE UP
GLUCOSE BLDC GLUCOMTR-MCNC: 132 MG/DL — HIGH (ref 70–99)
GLUCOSE BLDC GLUCOMTR-MCNC: 162 MG/DL — HIGH (ref 70–99)
GLUCOSE BLDC GLUCOMTR-MCNC: 170 MG/DL — HIGH (ref 70–99)
GLUCOSE BLDC GLUCOMTR-MCNC: 171 MG/DL — HIGH (ref 70–99)
GLUCOSE SERPL-MCNC: 201 MG/DL — HIGH (ref 70–99)
HCT VFR BLD CALC: 30.6 % — LOW (ref 39–50)
HGB BLD-MCNC: 9.1 G/DL — LOW (ref 13–17)
HYPOCHROMIA BLD QL: SLIGHT — SIGNIFICANT CHANGE UP
IANC: 8.45 K/UL — SIGNIFICANT CHANGE UP (ref 1.5–8.5)
INR BLD: 1.45 RATIO — HIGH (ref 0.88–1.16)
LYMPHOCYTES # BLD AUTO: 1.12 K/UL — SIGNIFICANT CHANGE UP (ref 1–3.3)
LYMPHOCYTES # BLD AUTO: 8 % — LOW (ref 13–44)
MACROCYTES BLD QL: SLIGHT — SIGNIFICANT CHANGE UP
MAGNESIUM SERPL-MCNC: 2.8 MG/DL — HIGH (ref 1.6–2.6)
MCHC RBC-ENTMCNC: 28 PG — SIGNIFICANT CHANGE UP (ref 27–34)
MCHC RBC-ENTMCNC: 29.7 GM/DL — LOW (ref 32–36)
MCV RBC AUTO: 94.2 FL — SIGNIFICANT CHANGE UP (ref 80–100)
MONOCYTES # BLD AUTO: 1.12 K/UL — HIGH (ref 0–0.9)
MONOCYTES NFR BLD AUTO: 8 % — SIGNIFICANT CHANGE UP (ref 2–14)
NEUTROPHILS # BLD AUTO: 10.62 K/UL — HIGH (ref 1.8–7.4)
NEUTROPHILS NFR BLD AUTO: 76.1 % — SIGNIFICANT CHANGE UP (ref 43–77)
NRBC # BLD: 13 /100 — HIGH (ref 0–0)
OVALOCYTES BLD QL SMEAR: SIGNIFICANT CHANGE UP
PHOSPHATE SERPL-MCNC: 5.9 MG/DL — HIGH (ref 2.5–4.5)
PLAT MORPH BLD: ABNORMAL
PLATELET # BLD AUTO: 220 K/UL — SIGNIFICANT CHANGE UP (ref 150–400)
PLATELET COUNT - ESTIMATE: NORMAL — SIGNIFICANT CHANGE UP
POIKILOCYTOSIS BLD QL AUTO: SLIGHT — SIGNIFICANT CHANGE UP
POLYCHROMASIA BLD QL SMEAR: SIGNIFICANT CHANGE UP
POTASSIUM SERPL-MCNC: 3.6 MMOL/L — SIGNIFICANT CHANGE UP (ref 3.5–5.3)
POTASSIUM SERPL-SCNC: 3.6 MMOL/L — SIGNIFICANT CHANGE UP (ref 3.5–5.3)
PROT SERPL-MCNC: 5.7 G/DL — LOW (ref 6–8.3)
PROTHROM AB SERPL-ACNC: 16.2 SEC — HIGH (ref 10.6–13.6)
RBC # BLD: 3.25 M/UL — LOW (ref 4.2–5.8)
RBC # FLD: 18.1 % — HIGH (ref 10.3–14.5)
RBC BLD AUTO: ABNORMAL
RH IG SCN BLD-IMP: POSITIVE — SIGNIFICANT CHANGE UP
SCHISTOCYTES BLD QL AUTO: SLIGHT — SIGNIFICANT CHANGE UP
SMUDGE CELLS # BLD: PRESENT — SIGNIFICANT CHANGE UP
SODIUM SERPL-SCNC: 131 MMOL/L — LOW (ref 135–145)
SPECIMEN SOURCE: SIGNIFICANT CHANGE UP
SPHEROCYTES BLD QL SMEAR: SLIGHT — SIGNIFICANT CHANGE UP
TROPONIN T, HIGH SENSITIVITY RESULT: 1860 NG/L — CRITICAL HIGH
VANCOMYCIN FLD-MCNC: 24.8 UG/ML — SIGNIFICANT CHANGE UP
VARIANT LYMPHS # BLD: 2.6 % — SIGNIFICANT CHANGE UP (ref 0–6)
WBC # BLD: 13.95 K/UL — HIGH (ref 3.8–10.5)
WBC # FLD AUTO: 13.95 K/UL — HIGH (ref 3.8–10.5)

## 2021-07-13 PROCEDURE — 71260 CT THORAX DX C+: CPT | Mod: 26

## 2021-07-13 PROCEDURE — 93971 EXTREMITY STUDY: CPT | Mod: 26

## 2021-07-13 PROCEDURE — 70450 CT HEAD/BRAIN W/O DYE: CPT | Mod: 26

## 2021-07-13 PROCEDURE — 71045 X-RAY EXAM CHEST 1 VIEW: CPT | Mod: 26

## 2021-07-13 PROCEDURE — 31500 INSERT EMERGENCY AIRWAY: CPT

## 2021-07-13 PROCEDURE — 74177 CT ABD & PELVIS W/CONTRAST: CPT | Mod: 26

## 2021-07-13 PROCEDURE — 99291 CRITICAL CARE FIRST HOUR: CPT

## 2021-07-13 PROCEDURE — 36556 INSERT NON-TUNNEL CV CATH: CPT

## 2021-07-13 PROCEDURE — 93306 TTE W/DOPPLER COMPLETE: CPT | Mod: 26

## 2021-07-13 RX ORDER — CHLORHEXIDINE GLUCONATE 213 G/1000ML
15 SOLUTION TOPICAL EVERY 12 HOURS
Refills: 0 | Status: DISCONTINUED | OUTPATIENT
Start: 2021-07-13 | End: 2021-07-23

## 2021-07-13 RX ORDER — PROPOFOL 10 MG/ML
10 INJECTION, EMULSION INTRAVENOUS
Qty: 1000 | Refills: 0 | Status: DISCONTINUED | OUTPATIENT
Start: 2021-07-13 | End: 2021-07-16

## 2021-07-13 RX ORDER — KETAMINE HYDROCHLORIDE 100 MG/ML
0.25 INJECTION INTRAMUSCULAR; INTRAVENOUS
Qty: 1000 | Refills: 0 | Status: DISCONTINUED | OUTPATIENT
Start: 2021-07-13 | End: 2021-07-14

## 2021-07-13 RX ORDER — MIDAZOLAM HYDROCHLORIDE 1 MG/ML
4 INJECTION, SOLUTION INTRAMUSCULAR; INTRAVENOUS ONCE
Refills: 0 | Status: DISCONTINUED | OUTPATIENT
Start: 2021-07-13 | End: 2021-07-13

## 2021-07-13 RX ORDER — FENTANYL CITRATE 50 UG/ML
100 INJECTION INTRAVENOUS ONCE
Refills: 0 | Status: DISCONTINUED | OUTPATIENT
Start: 2021-07-13 | End: 2021-07-13

## 2021-07-13 RX ADMIN — CHLORHEXIDINE GLUCONATE 15 MILLILITER(S): 213 SOLUTION TOPICAL at 17:11

## 2021-07-13 RX ADMIN — MIDAZOLAM HYDROCHLORIDE 4 MILLIGRAM(S): 1 INJECTION, SOLUTION INTRAMUSCULAR; INTRAVENOUS at 02:15

## 2021-07-13 RX ADMIN — PANTOPRAZOLE SODIUM 40 MILLIGRAM(S): 20 TABLET, DELAYED RELEASE ORAL at 17:11

## 2021-07-13 RX ADMIN — Medication 40.4 MICROGRAM(S)/KG/MIN: at 21:35

## 2021-07-13 RX ADMIN — CHLORHEXIDINE GLUCONATE 15 MILLILITER(S): 213 SOLUTION TOPICAL at 07:20

## 2021-07-13 RX ADMIN — Medication 81 MILLIGRAM(S): at 12:28

## 2021-07-13 RX ADMIN — Medication 30 MILLIGRAM(S): at 00:18

## 2021-07-13 RX ADMIN — MIDAZOLAM HYDROCHLORIDE 4 MILLIGRAM(S): 1 INJECTION, SOLUTION INTRAMUSCULAR; INTRAVENOUS at 03:30

## 2021-07-13 RX ADMIN — CHLORHEXIDINE GLUCONATE 1 APPLICATION(S): 213 SOLUTION TOPICAL at 12:29

## 2021-07-13 RX ADMIN — Medication 40.4 MICROGRAM(S)/KG/MIN: at 10:28

## 2021-07-13 RX ADMIN — PANTOPRAZOLE SODIUM 40 MILLIGRAM(S): 20 TABLET, DELAYED RELEASE ORAL at 06:15

## 2021-07-13 RX ADMIN — Medication 30 MILLIGRAM(S): at 12:28

## 2021-07-13 RX ADMIN — PROPOFOL 5.17 MICROGRAM(S)/KG/MIN: 10 INJECTION, EMULSION INTRAVENOUS at 21:39

## 2021-07-13 RX ADMIN — HEPARIN SODIUM 1000 UNIT(S)/HR: 5000 INJECTION INTRAVENOUS; SUBCUTANEOUS at 10:29

## 2021-07-13 RX ADMIN — KETAMINE HYDROCHLORIDE 2.16 MG/KG/HR: 100 INJECTION INTRAMUSCULAR; INTRAVENOUS at 21:34

## 2021-07-13 RX ADMIN — Medication 30 MILLIGRAM(S): at 06:16

## 2021-07-13 RX ADMIN — Medication 30 MILLIGRAM(S): at 17:12

## 2021-07-13 RX ADMIN — PROPOFOL 5.17 MICROGRAM(S)/KG/MIN: 10 INJECTION, EMULSION INTRAVENOUS at 10:28

## 2021-07-13 RX ADMIN — KETAMINE HYDROCHLORIDE 2.16 MG/KG/HR: 100 INJECTION INTRAMUSCULAR; INTRAVENOUS at 10:29

## 2021-07-13 RX ADMIN — ATORVASTATIN CALCIUM 40 MILLIGRAM(S): 80 TABLET, FILM COATED ORAL at 22:09

## 2021-07-13 NOTE — PROGRESS NOTE ADULT - SUBJECTIVE AND OBJECTIVE BOX
Date of Service: 07-13-21 @ 12:33    Patient is a 70y old  Male who presents with a chief complaint of Chest pain and GIB (13 Jul 2021 12:01)      Any change in ROS: his overnight events noted:  s/p cpr: rosc; 10 mts:   had to be shocked for vib: v tach:  currently intubated and sedated on 405 fio2:     MEDICATIONS  (STANDING):  aspirin  chewable 81 milliGRAM(s) Oral daily  atorvastatin 40 milliGRAM(s) Oral at bedtime  chlorhexidine 0.12% Liquid 15 milliLiter(s) Oral Mucosa every 12 hours  chlorhexidine 2% Cloths 1 Application(s) Topical daily  dextrose 40% Gel 15 Gram(s) Oral once  dextrose 5%. 1000 milliLiter(s) (50 mL/Hr) IV Continuous <Continuous>  dextrose 5%. 1000 milliLiter(s) (100 mL/Hr) IV Continuous <Continuous>  dextrose 50% Injectable 25 Gram(s) IV Push once  dextrose 50% Injectable 12.5 Gram(s) IV Push once  dextrose 50% Injectable 25 Gram(s) IV Push once  diltiazem    Tablet 30 milliGRAM(s) Oral four times a day  epoetin danilo-epbx (RETACRIT) Injectable 13462 Unit(s) IV Push <User Schedule>  glucagon  Injectable 1 milliGRAM(s) IntraMuscular once  heparin  Infusion. 1000 Unit(s)/Hr (10 mL/Hr) IV Continuous <Continuous>  insulin lispro (ADMELOG) corrective regimen sliding scale   SubCutaneous three times a day before meals  ketamine Infusion. 0.25 mG/kG/Hr (2.16 mL/Hr) IV Continuous <Continuous>  norepinephrine Infusion 0.25 MICROgram(s)/kG/Min (40.4 mL/Hr) IV Continuous <Continuous>  pantoprazole  Injectable 40 milliGRAM(s) IV Push every 12 hours  propofol Infusion 10 MICROgram(s)/kG/Min (5.17 mL/Hr) IV Continuous <Continuous>    MEDICATIONS  (PRN):  acetaminophen   Tablet .. 1000 milliGRAM(s) Oral every 6 hours PRN Temp greater or equal to 38C (100.4F), Mild Pain (1 - 3), Moderate Pain (4 - 6)  calamine/zinc oxide Lotion 1 Application(s) Topical three times a day PRN Itching    Vital Signs Last 24 Hrs  T(C): 37.3 (13 Jul 2021 08:00), Max: 37.3 (13 Jul 2021 08:00)  T(F): 99.2 (13 Jul 2021 08:00), Max: 99.2 (13 Jul 2021 08:00)  HR: 73 (13 Jul 2021 11:13) (58 - 125)  BP: 153/29 (13 Jul 2021 11:00) (84/41 - 174/45)  BP(mean): 59 (13 Jul 2021 11:00) (35 - 111)  RR: 24 (13 Jul 2021 11:00) (13 - 32)  SpO2: 99% (13 Jul 2021 11:13) (57% - 100%)  Mode: AC/ CMV (Assist Control/ Continuous Mandatory Ventilation)  RR (machine): 24  TV (machine): 450  FiO2: 40  PEEP: 5  MAP: 12  PIP: 26    I&O's Summary    12 Jul 2021 07:01  -  13 Jul 2021 07:00  --------------------------------------------------------  IN: 1367.9 mL / OUT: 500 mL / NET: 867.9 mL    13 Jul 2021 07:01  -  13 Jul 2021 12:33  --------------------------------------------------------  IN: 298.3 mL / OUT: 0 mL / NET: 298.3 mL          Physical Exam:   GENERAL: NAD, well-groomed, well-developed  HEENT: KATHY/   Atraumatic, Normocephalic  ENMT: No tonsillar erythema, exudates, or enlargement; Moist mucous membranes, Good dentition, No lesions  NECK: Supple, No JVD, Normal thyroid  CHEST/LUNG: Clear to auscultaion, ; No rales, rhonchi, wheezing, or rubs  CVS: Regular rate and rhythm; No murmurs, rubs, or gallops  GI: : Soft, Nontender, Nondistended; Bowel sounds present  NERVOUS SYSTEM:  intubated and sedated   EXTREMITIES: -edema  LYMPH: No lymphadenopathy noted  SKIN: No rashes or lesions  ENDOCRINOLOGY: No Thyromegaly  PSYCH:sedated    Labs:  ABG - ( 13 Jul 2021 02:50 )  pH, Arterial: 7.29  pH, Blood: x     /  pCO2: 46    /  pO2: 113   / HCO3: 21    / Base Excess: -4.0  /  SaO2: 98.3            22, 26  CARDIAC MARKERS ( 13 Jul 2021 02:50 )  x     / x     / 50 U/L / x     / 7.1 ng/mL  CARDIAC MARKERS ( 12 Jul 2021 01:49 )  x     / x     / 56 U/L / x     / x                                9.1    13.95 )-----------( 220      ( 13 Jul 2021 02:50 )             30.6                         8.9    13.60 )-----------( 234      ( 12 Jul 2021 21:14 )             29.7                         9.0    13.27 )-----------( 218      ( 12 Jul 2021 08:31 )             29.8                         9.3    18.00 )-----------( 255      ( 12 Jul 2021 01:47 )             31.8                         9.1    15.61 )-----------( 221      ( 11 Jul 2021 18:38 )             30.2                         9.5    18.48 )-----------( 208      ( 11 Jul 2021 06:36 )             31.8                         9.6    16.90 )-----------( 184      ( 10 Jul 2021 03:13 )             31.6     07-13    131<L>  |  92<L>  |  24<H>  ----------------------------<  201<H>  3.6   |  18<L>  |  6.40<H>  07-12    130<L>  |  92<L>  |  23  ----------------------------<  183<H>  4.2   |  21<L>  |  6.05<H>  07-12    130<L>  |  90<L>  |  33<H>  ----------------------------<  207<H>  5.0   |  19<L>  |  8.49<H>  07-11    125<L>  |  91<L>  |  32<H>  ----------------------------<  235<H>  4.7   |  20<L>  |  8.56<H>  07-11    128<L>  |  92<L>  |  28<H>  ----------------------------<  179<H>  4.4   |  20<L>  |  7.83<H>  07-10    132<L>  |  93<L>  |  16  ----------------------------<  169<H>  3.9   |  23  |  5.72<H>    Ca    9.9      13 Jul 2021 02:50  Ca    8.4      12 Jul 2021 21:14  Ca    9.0      12 Jul 2021 01:49  Ca    8.4      11 Jul 2021 18:38  Phos  5.9     07-13  Phos  4.5     07-12  Phos  5.7     07-12  Phos  5.7     07-11  Mg     2.80     07-13  Mg     1.70     07-12  Mg     2.10     07-12  Mg     1.80     07-11    TPro  5.7<L>  /  Alb  3.0<L>  /  TBili  0.5  /  DBili  x   /  AST  24  /  ALT  5   /  AlkPhos  92  07-13  TPro  5.7<L>  /  Alb  3.0<L>  /  TBili  0.4  /  DBili  x   /  AST  24  /  ALT  5   /  AlkPhos  93  07-12  TPro  5.7<L>  /  Alb  3.0<L>  /  TBili  0.4  /  DBili  x   /  AST  27  /  ALT  8   /  AlkPhos  90  07-12  TPro  5.6<L>  /  Alb  3.0<L>  /  TBili  0.4  /  DBili  x   /  AST  25  /  ALT  9   /  AlkPhos  88  07-11  TPro  5.9<L>  /  Alb  2.9<L>  /  TBili  0.5  /  DBili  x   /  AST  30  /  ALT  14  /  AlkPhos  88  07-11  TPro  6.0  /  Alb  3.3  /  TBili  0.7  /  DBili  x   /  AST  45<H>  /  ALT  21  /  AlkPhos  77  07-10    CAPILLARY BLOOD GLUCOSE      POCT Blood Glucose.: 170 mg/dL (13 Jul 2021 06:05)  POCT Blood Glucose.: 194 mg/dL (12 Jul 2021 21:09)  POCT Blood Glucose.: 171 mg/dL (12 Jul 2021 17:24)      LIVER FUNCTIONS - ( 13 Jul 2021 02:50 )  Alb: 3.0 g/dL / Pro: 5.7 g/dL / ALK PHOS: 92 U/L / ALT: 5 U/L / AST: 24 U/L / GGT: x           PT/INR - ( 13 Jul 2021 02:50 )   PT: 16.2 sec;   INR: 1.45 ratio         PTT - ( 13 Jul 2021 02:50 )  PTT:69.6 sec    Procalcitonin, Serum: 3.32 ng/mL (07-12 @ 01:49)  Fluid Source PLEURAL  Albumin, Fluid--  Glucose, Agzvn529 mg/dL  Protein total, Fluid3.2 g/dL  Lacatate Dehydrogenase, Fluid67 U/L  pH, Fluid--  Cytopathology-Non Gyn Report--        RECENT CULTURES:  07-12 @ 06:12 .Blood Blood                No growth to date.    07-11 @ 23:16 .Blood Blood-Peripheral         r< from: Xray Chest 1 View- PORTABLE-Urgent (Xray Chest 1 View- PORTABLE-Urgent .) (07.13.21 @ 05:35) >        IMPRESSION:  Enteric tube extends into the abdomen on the most recent image. The tip is not seen on the image. The side-port is again near the GE junction. Recommend advancing the enteric tube. Discussed with Dr. Ballard with read back at 9:36 AM on July 13, 2021 by Dr. Camarena.    Left IJ line with tip in distal left brachiocephalic vein. No pneumothorax.    ET tube tip is 3.5 cm above the joshua.    Loculated left pleural effusion with likely associated passive atelectasis, not significantly changed.    Right upper lung opacity, not significantly changed. Increased patchy right lower lung opacity and new patchy left upper lung opacity, on the most recent image. Findings are nonspecific but could be related to atelectasis or multifocal pneumonia.    --- End of Report ---              KWASI CAMARENA MD; Attending Radiologist  Thisdocument has been electronically signed. Jul 13 2021  9:37AM    < end of copied text >         No growth to date.    07-09 @ 08:17 .Blood Blood       Growth in aerobic and anaerobic bottles: Gram Positive Cocci in Clusters           No growth to date.    07-08 @ 20:48 .Body Fluid pleural fluid       polymorphonuclear leukocytes seen  No organisms seen  by cytocentrifuge           No growth    07-07 @ 23:06 .Blood Blood-Venous   PCR    Growth in aerobic bottle: Gram Positive Cocci in Clusters  Growth in anaerobic bottle: Gram Positive Cocci in Clusters    Blood Culture PCR  Methicillin resistant Staphylococcus aureus  Blood Culture PCR     Growth in aerobic and anaerobic bottles: Methicillin resistant  Staphylococcus aureus  ***Blood Panel PCR results on this specimen are available  approximately 3 hours after the Gram stain result.***  Gram stain, PCR, and/or culture results may notalways  correspond due to difference in methodologies.  ************************************************************  This PCR assay was performed by multiplex PCR. This  Assay tests for 66 bacterial and resistance gene targets.  Please refer to the [facility] test directory  at [email or web address] for details.    07-07 @ 23:05 .Blood Blood       Growth in aerobic bottle: Gram Positive Cocci in Clusters  Growth in anaerobic bottle: Gram Positive Cocci in Clusters           Growth in aerobic and anaerobic bottles: Methicillin resistant  Staphylococcus aureus  See previous culture [id number]          RESPIRATORY CULTURES:          Studies  Chest X-RAY  CT SCAN Chest   Venous Dopplers: LE:   CT Abdomen  Others

## 2021-07-13 NOTE — PROGRESS NOTE ADULT - SUBJECTIVE AND OBJECTIVE BOX
CARDIOLOGY ATTENDING    tele - atrial flutter    S: events noted; pt. s/p PEA arrest now s/p intubation. ros limited.     Review of Systems:   Constitutional: [ ] fevers, [ ] chills.   Skin: [ ] dry skin. [ ] rashes.  Psychiatric: [ ] depression, [ ] anxiety.   Gastrointestinal: [ ] BRBPR, [ ] melena.   Neurological: [ ] confusion. [ ] seizures. [ ] shuffling gait.   Ears,Nose,Mouth and Throat: [ ] ear pain [ ] sore throat.   Eyes: [ ] diplopia.   Respiratory: [ ] hemoptysis. [ ] shortness of breath  Cardiovascular: See HPI above  Hematologic/Lymphatic: [ ] anemia. [ ] painful nodes. [ ] prolonged bleeding.   Genitourinary: [ ] hematuria. [ ] flank pain.   Endocrine: [ ] significant change in weight. [ ] intolerance to heat and cold.     Review of systems [ ] otherwise negative, [x ] otherwise unable to obtain    FH: no family history of sudden cardiac death in first degree relatives    SH: [ ] tobacco, [ ] alcohol, [ ] drugs        acetaminophen   Tablet .. 1000 milliGRAM(s) Oral every 6 hours PRN  aspirin  chewable 81 milliGRAM(s) Oral daily  atorvastatin 40 milliGRAM(s) Oral at bedtime  calamine/zinc oxide Lotion 1 Application(s) Topical three times a day PRN  chlorhexidine 0.12% Liquid 15 milliLiter(s) Oral Mucosa every 12 hours  chlorhexidine 2% Cloths 1 Application(s) Topical daily  dextrose 40% Gel 15 Gram(s) Oral once  dextrose 5%. 1000 milliLiter(s) IV Continuous <Continuous>  dextrose 5%. 1000 milliLiter(s) IV Continuous <Continuous>  dextrose 50% Injectable 25 Gram(s) IV Push once  dextrose 50% Injectable 12.5 Gram(s) IV Push once  dextrose 50% Injectable 25 Gram(s) IV Push once  diltiazem    Tablet 30 milliGRAM(s) Oral four times a day  epoetin danilo-epbx (RETACRIT) Injectable 33284 Unit(s) IV Push <User Schedule>  glucagon  Injectable 1 milliGRAM(s) IntraMuscular once  heparin  Infusion. 1000 Unit(s)/Hr IV Continuous <Continuous>  insulin lispro (ADMELOG) corrective regimen sliding scale   SubCutaneous three times a day before meals  ketamine Infusion. 0.25 mG/kG/Hr IV Continuous <Continuous>  norepinephrine Infusion 0.25 MICROgram(s)/kG/Min IV Continuous <Continuous>  pantoprazole  Injectable 40 milliGRAM(s) IV Push every 12 hours  propofol Infusion 10 MICROgram(s)/kG/Min IV Continuous <Continuous>                            9.1    13.95 )-----------( 220      ( 13 Jul 2021 02:50 )             30.6       07-13    131<L>  |  92<L>  |  24<H>  ----------------------------<  201<H>  3.6   |  18<L>  |  6.40<H>    Ca    9.9      13 Jul 2021 02:50  Phos  5.9     07-13  Mg     2.80     07-13    TPro  5.7<L>  /  Alb  3.0<L>  /  TBili  0.5  /  DBili  x   /  AST  24  /  ALT  5   /  AlkPhos  92  07-13      CARDIAC MARKERS ( 13 Jul 2021 02:50 )  x     / x     / 50 U/L / x     / 7.1 ng/mL  CARDIAC MARKERS ( 12 Jul 2021 01:49 )  x     / x     / 56 U/L / x     / x      CARDIAC MARKERS ( 11 Jul 2021 06:38 )  x     / x     / x     / x     / 18.1 ng/mL  CARDIAC MARKERS ( 10 Jul 2021 18:49 )  x     / x     / 105 U/L / x     / 23.8 ng/mL        T(C): 36.2 (07-13-21 @ 16:00), Max: 37.3 (07-13-21 @ 08:00)  HR: 71 (07-13-21 @ 16:02) (58 - 125)  BP: 155/38 (07-13-21 @ 16:00) (84/41 - 174/45)  RR: 24 (07-13-21 @ 16:00) (13 - 32)  SpO2: 99% (07-13-21 @ 16:02) (57% - 100%)  Wt(kg): --    I&O's Summary    12 Jul 2021 07:01  -  13 Jul 2021 07:00  --------------------------------------------------------  IN: 1367.9 mL / OUT: 500 mL / NET: 867.9 mL    13 Jul 2021 07:01  -  13 Jul 2021 17:06  --------------------------------------------------------  IN: 1075.8 mL / OUT: 400 mL / NET: 675.8 mL        General: intubated; sedated   Head: Normocephalic and atraumatic.   Neck: No JVD. No bruits. Supple. Does not appear to be enlarged.   Cardiovascular: + S1,S2 ; RRR Soft systolic murmur at the left lower sternal border. No rubs noted.    Lungs: decreased BS BL  Abdomen: + BS, soft. Non tender. Non distended. No rebound. No guarding.   Extremities: No clubbing/cyanosis/edema.   Neurologic: unable to assess  Skin: Warm and moist. The patient's skin has normal elasticity and good skin turgor.   Psychiatric: unable to assess   Musculoskeletal: unable to assess    DATA  < from: Transthoracic Echocardiogram (07.07.21 @ 18:17) >  ------------------------------------------------------------------------  CONCLUSIONS:  1. Mitral annular calcification, otherwise normal mitral  valve. Minimal mitral regurgitation.  2. Endocardium not well visualized; grossly normal left  ventricular systolic function.  3. Right ventricular enlargement with decreased right  ventricular systolic function.  4. Inadequate tricuspid regurgitation Doppler envelope  precludes estimation of RVSP.  ------------------------------------------------------------------------  Confirmed on  7/7/2021 - 21:16:20 by Osvaldo Bertrand M.D.,  [facility], [facility]    < end of copied text >        A/P) 69 y/o male PMH CAD s/p CABG and old PCI, AFL x 1 year on coumadin, HTN, hyperlipidemia, DM, ESRD on HD, and hypothyroidism originally admitted to [facility] with GIB, transferred to [facility], found to have septic shock and MRSA bacteremia.     -Events noted; pt. s/p PEA arrest and underwent shocks during code for VT  -Repeat TTE with only mild segmental LV dysfunction in the inferoseptum  -Given negative CPK, do not suspect acs  -Suspect elevated troponin in the setting of demand ischemia from underlying septic shock  -Pressor support per MICU  -Follow up EP  -Given MRSA bacteremia, check TRUMAN to rule out endocarditis - given GIB will need GI clearance  -Abx per ID  -Continue with supportive care per MICU    Kalie Lau MD

## 2021-07-13 NOTE — PROGRESS NOTE ADULT - SUBJECTIVE AND OBJECTIVE BOX
INTERVAL HPI/OVERNIGHT EVENTS:    events noted; cardiac arrest/asystole now intubated in MICU   remains free of rectal bleeding    MEDICATIONS  (STANDING):  aspirin  chewable 81 milliGRAM(s) Oral daily  atorvastatin 40 milliGRAM(s) Oral at bedtime  chlorhexidine 0.12% Liquid 15 milliLiter(s) Oral Mucosa every 12 hours  chlorhexidine 2% Cloths 1 Application(s) Topical daily  dextrose 40% Gel 15 Gram(s) Oral once  dextrose 5%. 1000 milliLiter(s) (50 mL/Hr) IV Continuous <Continuous>  dextrose 5%. 1000 milliLiter(s) (100 mL/Hr) IV Continuous <Continuous>  dextrose 50% Injectable 25 Gram(s) IV Push once  dextrose 50% Injectable 12.5 Gram(s) IV Push once  dextrose 50% Injectable 25 Gram(s) IV Push once  diltiazem    Tablet 30 milliGRAM(s) Oral four times a day  epoetin danilo-epbx (RETACRIT) Injectable 51622 Unit(s) IV Push <User Schedule>  glucagon  Injectable 1 milliGRAM(s) IntraMuscular once  heparin  Infusion. 1000 Unit(s)/Hr (10 mL/Hr) IV Continuous <Continuous>  insulin lispro (ADMELOG) corrective regimen sliding scale   SubCutaneous three times a day before meals  ketamine Infusion. 0.25 mG/kG/Hr (2.16 mL/Hr) IV Continuous <Continuous>  norepinephrine Infusion 0.25 MICROgram(s)/kG/Min (40.4 mL/Hr) IV Continuous <Continuous>  pantoprazole  Injectable 40 milliGRAM(s) IV Push every 12 hours  propofol Infusion 10 MICROgram(s)/kG/Min (5.17 mL/Hr) IV Continuous <Continuous>    MEDICATIONS  (PRN):  acetaminophen   Tablet .. 1000 milliGRAM(s) Oral every 6 hours PRN Temp greater or equal to 38C (100.4F), Mild Pain (1 - 3), Moderate Pain (4 - 6)  calamine/zinc oxide Lotion 1 Application(s) Topical three times a day PRN Itching      Allergies    lisinopril (Other)  opioid-like analgesics (Other)    Intolerances        Review of Systems: *pt minimally verbal to nonverbal, unable to obtain ROS       Vital Signs Last 24 Hrs  T(C): 37.3 (13 Jul 2021 08:00), Max: 37.3 (13 Jul 2021 08:00)  T(F): 99.2 (13 Jul 2021 08:00), Max: 99.2 (13 Jul 2021 08:00)  HR: 73 (13 Jul 2021 11:13) (58 - 125)  BP: 153/29 (13 Jul 2021 11:00) (84/41 - 174/45)  BP(mean): 59 (13 Jul 2021 11:00) (35 - 111)  RR: 24 (13 Jul 2021 11:00) (13 - 32)  SpO2: 99% (13 Jul 2021 11:13) (57% - 100%)    PHYSICAL EXAM:    Constitutional: NAD  HEENT: EOMI, throat clear  Neck: No LAD, supple  Respiratory: intubated   Cardiovascular: S1 and S2, RRR, no M  Gastrointestinal: BS+, soft, NT/ND, neg HSM,  Extremities: No peripheral edema, neg clubbing, cyanosis  Vascular: 2+ peripheral pulses  Neurological: A/O x 0  Psychiatric: letahrgic/intubated   Skin: No rashes      LABS:                        9.1    13.95 )-----------( 220      ( 13 Jul 2021 02:50 )             30.6     07-13    131<L>  |  92<L>  |  24<H>  ----------------------------<  201<H>  3.6   |  18<L>  |  6.40<H>    Ca    9.9      13 Jul 2021 02:50  Phos  5.9     07-13  Mg     2.80     07-13    TPro  5.7<L>  /  Alb  3.0<L>  /  TBili  0.5  /  DBili  x   /  AST  24  /  ALT  5   /  AlkPhos  92  07-13    PT/INR - ( 13 Jul 2021 02:50 )   PT: 16.2 sec;   INR: 1.45 ratio         PTT - ( 13 Jul 2021 02:50 )  PTT:69.6 sec      RADIOLOGY & ADDITIONAL TESTS:

## 2021-07-13 NOTE — PROGRESS NOTE ADULT - ASSESSMENT
69 yo M with PMhx of CAD s/p CABG (course at that time complicated by pleural effusion requiring chest tubes), ESRD on HD (MWF), DMII, HTN, afib on coumadin, and anemia transferred from [facility] for evaluation of chest pain after HD and GIB s/p 2U PRBC transfusion and aflutter/afib with RVR managed with metoprolol. Admitted to MICU for hypotension dependent on pressors likely 2/2 to sepsis.    #Neuro  - no active issues.  - At times, get confused in the morning but still A&Ox3    #Cardiovascular  - Hypotension: MICU was consulted one day prior to transfer for hypotension responsive to Midodrine and 500cc IV bolus. Day of transfer, he presented with bradycardia and worsening hypotension after placement of chest tube. He was given atropine, Chris and Levophed.  - TTE on 7/7: Right ventricular enlargement with decreased right ventricular systolic function. Inadequate tricuspid regurgitation..    - c/w Levophed wean as tolerated.     - Will consider switching to midodrine once HR stable    - monitor HR and BP    - Troponins on increasing trend, latest at 1963, CKMB 34.5. EKG with variable baseline in the setting of baseline aflutter. His cardiology service was contacted and they are aware of the condition. Likely 2/2 chronic condition/ESRD on HD vs Hypotensive event. Will continue to trend and monitor. Patient currently asymptomatic.  - per cards, TRUMAN once stable    A. Fib  - on coumadin at home,  - c/ heparin gtt prophylaxis  - 7/11: tried to transition heparin gtt to eliquis but pt is not a candidate due to his acute MIGUEL; back on heparin gtt   - d/c metoprolol and switch to diltiazem for rate control    #Respiratory  - Loculated Pleural effusions: chest tube removed   - pleural fluid cx: (-); transudative.   - currently saturating adequately on NC @ 6L. Wean as tolerated.  - CXR s/p chest tube removal showed no evidence of atelectasis. Demonstrated loculated left pleural effusion decreased in size and new hazy right lower lung opacity which could be due to a small layering right pleural effusion with associated passive atelectasis, atelectasis of other cause, and/or pneumonia.  - abx as below      #GI/Nutrition:  - Diet: Renal diet    - Was C/o abdominal pain, bedside POCUS didn't show ascites. No abdominal pain at this time. Continue to monitor.   - negative occult bleeding, hgb stable  - per GI, no urgent endoscopic evaluation as H/H stable and negative occult  - possible inpatient scope if becomes necessary when fully optimized by medicine and cardio   -Zofran PRN for Nausea    #/Renal  - ESRD: HD on MWF. Most recent Scr 8.49 an increase from the previous labs. Potassium is stable.   - Last HD session 7/12. 100ml removed. Next session planned for 7/14  - c/w Epo 20k tiw with HD      #Skin  - HD Access: fistula in left upper arm  - Improving erythematous and tender on right wrist. Continue to monitor. RUE US neg for thrombus      #ID  - S aureus PCR: (+),  MRSA PCR negative  - BCx on 7/7 now growing MRSA: restarted on vanc (7/11- )  - Get vanc level before HD and vanc by level   - Surveillance blood Cx q48h until neg.  - d/c Cefazolin  - Will remove MRSA precaution as not necessary per ID for MRSA in blood    #Endocrine  - DMII: c/w Lispro SS. Monitor glucose.      #Hematologic/DVT ppx  - Heparin gtt      #Ethics  - full code as per wife- full code as per wife     71 yo M with PMhx of CAD s/p CABG (course at that time complicated by pleural effusion requiring chest tubes), ESRD on HD (MWF), DMII, HTN, afib on coumadin, and anemia transferred from [facility] for evaluation of chest pain after HD and GIB s/p 2U PRBC transfusion and aflutter/afib with RVR managed with metoprolol. Admitted to MICU for hypotension dependent on pressors likely 2/2 to sepsis.    #Neuro  - patient sedated on propofol and ketamine  - f/u CT head s/p cardiac arrest    #Cardiovascular  - Cardiac arrest: Patient had asystole last night and CPR was started. S/p epi x3 and shock x2 with return to NSR. Cards is following  - Per cardiology, will get TTE today to evaluate structural and functional integrity of the heart.   - TRUMAN scheduled for tomorrow  - Hypotension: MICU was consulted one day prior to transfer for hypotension responsive to Midodrine and 500cc IV bolus. Day of transfer, he presented with bradycardia and worsening hypotension after placement of chest tube. He was given atropine, Chris and Levophed.  - TTE on 7/7: Right ventricular enlargement with decreased right ventricular systolic function. Inadequate tricuspid regurgitation..    - c/w Levophed wean as tolerated.     - Will consider switching to midodrine once HR stable  - Troponins 1860, CKMB 7.1. Troponin stable from prior to cardiac arrest. They are likely 2/2 chronic condition/ESRD on HD vs Hypotensive event. Will continue to trend and monitor.    A. Fib  - on coumadin at home,  - c/ heparin gtt prophylaxis  - 7/11: tried to transition heparin gtt to eliquis but pt is not a candidate due to his acute MIGUEL; back on heparin gtt   - c/ diltiazem for rate control    #Respiratory  - Loculated Pleural effusions: chest tube removed   - pleural fluid cx: (-); transudative.   - currently saturating adequately on NC @ 6L. Wean as tolerated.  - CXR s/p chest tube removal showed no evidence of atelectasis. Demonstrated loculated left pleural effusion decreased in size and new hazy right lower lung opacity which could be due to a small layering right pleural effusion with associated passive atelectasis, atelectasis of other cause, and/or pneumonia.  - abx as below      #GI/Nutrition:  - Diet: Renal diet    - Was C/o abdominal pain, bedside POCUS didn't show ascites. No abdominal pain at this time. Continue to monitor.   - negative occult bleeding, hgb stable  - Will contact GI for endoscopic evaluation prior to TRUMAN tomorrow.   -Zofran PRN for Nausea    #/Renal  - ESRD: HD on MWF. Most recent Scr 6.40. Potassium is stable.   - Patient lactate 5.3 and ABG indicative of acidemia  - contacted nephro and plan to HD today without removing volume  - Next session planned for 7/14  - c/w Epo 20k tiw with HD      #Skin  - HD Access: fistula in left upper arm  - Improving erythematous and tender on right wrist. Continue to monitor. RUE US neg for thrombus      #ID  - S aureus PCR: (+),  MRSA PCR negative  - BCx on 7/7 now growing MRSA: restarted on vanc (7/11- )  - Get vanc level before HD and vanc by level   - Vanc level today 24.8.  - Surveillance blood Cx q48h until neg.  - Will remove MRSA precaution as not necessary per ID for MRSA in blood    #Endocrine  - DMII: c/w Lispro SS. Monitor glucose.      #Hematologic/DVT ppx  - Heparin gtt      #Ethics  - full code as per wife- full code as per wife

## 2021-07-13 NOTE — PROGRESS NOTE ADULT - ASSESSMENT
69 yo M with PMhx of CAD s/p CABG (course at that time complicated by pleural effusion requiring chest tubes), ESRD on HD (MWF), DMII, HTN, afib on coumadin, and anemia presented from [facility] for evaluation of chest pain and GIB.       left sided pleural effusion: loculated and chronic:   CAD:  CABG:  ESRD:   DM:  HTN:  A fibrillation:   GI Bleed    7/7:    left sided pleural effusion: loculated and chronic: he has chr loculated pleural effusion on left side: He had chest tube placement in last June 2020 at another hospital: no chemistry is available but he had negative cytology at that time: This time the effusion seems slightly worse and has loculated effusion with pleural thickening: heis not SOB andhasbeen on roomair: I think , it at all, if this effusion needs to be dealt with : it is probably vats: will contact thoracic surgery : etiology not very clear as there is no previous chemistry: coult be exudative or transudatve: he is on HD and now it is a chr christiano effusion   CAD: cont per cards  CABG: cont current meds  ESRD: on HD   DM: monitor and control  HTN: controlled  A fibrillation: off ac:   GI Bleed: per gi :  DW pt and t eam1    7/8:    left sided pleural effusion:: fever: s/p left sided chest tube placement: cultures sent: however with pr criteria it seems transudative await serum LDH but pleural fluid LDH is low:  already started on broad spectrum antibiotics ID to see: await cultures:   CAD: cont per cards  CABG: cont current meds  ESRD: on HD   DM: monitor and control  HTN: controlled  A fibrillation: off ac:   GI Bleed: per gi :  DW pmd    7/9:    left sided pleural effusion:: fever: s/p left sided chest tube placement: cultures sent: however with pr criteria it seems borderline transudative and transudative by LDH criteria: the vulutres ahve been negative so far:  already started on broad spectrum antibiotics ID to see: await cultures: ? source of spesis  CAD: cont per cards  CABG: cont current meds  ESRD: on HD   DM: monitor and control  HTN: in shock: now on vasopressors:   A fibrillation: off ac:   GI Bleed: per gi :  DW pmd  micu team    7/11:      left sided pleural effusion:: fever: s/p left sided chest tube placement: cultures sent: however with pr criteria it seems borderline transudative and transudative by LDH criteria: the cultures have been negative so far:  already started on broad spectrum antibiotics : Has MSSA bactermia  CAD: cont per cards: still on vasopressors: wean as tolerated ? needs molly ro tule out IE : would defer to cards:   CABG: cont current meds  ESRD: on HD   DM: monitor and control  HTN: in shock: now on vasopressors:   A fibrillation: off ac:   GI Bleed: per gi :  DW pmd  micu team    7/12:    left sided pleural effusion:: fever: s/p left sided chest tube placement:- now removed: cultures sent: however with pr criteria it seems borderline transudative and transudative by LDH criteria: the cultures have been negative so far:  already started on broad spectrum antibiotics : Has MRSA bactermia: vanco restarted yesterday ? echo/molly  Ac hypercarbic resp fialure: needs to be watched closelyin MICU : needs bipap may need intubation" At this time he is alert and awake: and responds to simple questions:    CAD: cont per cards: still on vasopressors: wean as tolerated ? needs molly ro tule out IE : would defer to cards:   CABG: cont current meds  ESRD: on HD   DM: monitor and control  HTN: in shock: now on vasopressors:   A fibrillation: off ac:   GI Bleed: per gi :  DW pmd  cards and bedstaff    7/13:  left sided pleural effusion:: fever: s/p left sided chest tube placement:- now removed: cultures sent: however with pr criteria it seems borderline transudative and transudative by LDH criteria: the cultures have been negative so far:  already started on broad spectrum antibiotics : Has MRSA bacteremia vanco restarted yesterday ? echo/molly  Ac hypercarbic resp fialure: now intubated s/p acls protocol last night   CAD: cont per cards: still on vasopressors: wean as tolerated ? needs molly ro tule out IE : would defer to cards:   CABG: cont current meds  ESRD: on HD   DM: monitor and control  HTN: in shock: now on vasopressors:   A fibrillation: off ac:   GI Bleed: per gi :  LINDA  staff  pt is critically ill now:

## 2021-07-13 NOTE — PROGRESS NOTE ADULT - SUBJECTIVE AND OBJECTIVE BOX
Mainor Bowers  PGY-1 | Internal Medicine  *INCOMPLETE*    INTERVAL HPI/OVERNIGHT EVENTS:    SUBJECTIVE: Patient seen and examined at bedside.     CONSTITUTIONAL: No weakness, fevers or chills  EYES/ENT: No visual changes;  No vertigo or throat pain   NECK: No pain or stiffness  RESPIRATORY: No cough, wheezing, hemoptysis; No shortness of breath  CARDIOVASCULAR: No chest pain or palpitations  GASTROINTESTINAL: No abdominal or epigastric pain. No nausea, vomiting, or hematemesis; No diarrhea or constipation. No melena or hematochezia.  GENITOURINARY: No dysuria, frequency or hematuria  NEUROLOGICAL: No numbness or weakness  SKIN: No itching, rashes    OBJECTIVE:    VITAL SIGNS:  ICU Vital Signs Last 24 Hrs  T(C): 37.3 (13 Jul 2021 08:00), Max: 37.3 (13 Jul 2021 08:00)  T(F): 99.2 (13 Jul 2021 08:00), Max: 99.2 (13 Jul 2021 08:00)  HR: 73 (13 Jul 2021 11:13) (58 - 125)  BP: 153/29 (13 Jul 2021 11:00) (84/41 - 174/45)  BP(mean): 59 (13 Jul 2021 11:00) (35 - 111)  ABP: --  ABP(mean): --  RR: 24 (13 Jul 2021 11:00) (13 - 32)  SpO2: 99% (13 Jul 2021 11:13) (57% - 100%)    Mode: AC/ CMV (Assist Control/ Continuous Mandatory Ventilation), RR (machine): 24, TV (machine): 450, FiO2: 40, PEEP: 5, MAP: 12, PIP: 26    07-12 @ 07:01  -  07-13 @ 07:00  --------------------------------------------------------  IN: 1367.9 mL / OUT: 500 mL / NET: 867.9 mL    07-13 @ 07:01 - 07-13 @ 12:01  --------------------------------------------------------  IN: 298.3 mL / OUT: 0 mL / NET: 298.3 mL      CAPILLARY BLOOD GLUCOSE      POCT Blood Glucose.: 170 mg/dL (13 Jul 2021 06:05)      PHYSICAL EXAM:    General: NAD  HEENT: NC/AT; PERRL, clear conjunctiva  Neck: supple  Respiratory: CTA b/l  Cardiovascular: +S1/S2; RRR  Abdomen: soft, NT/ND; +BS x4  Extremities: WWP, 2+ peripheral pulses b/l; no LE edema  Skin: normal color and turgor; no rash  Neurological:    MEDICATIONS:  MEDICATIONS  (STANDING):  aspirin  chewable 81 milliGRAM(s) Oral daily  atorvastatin 40 milliGRAM(s) Oral at bedtime  chlorhexidine 0.12% Liquid 15 milliLiter(s) Oral Mucosa every 12 hours  chlorhexidine 2% Cloths 1 Application(s) Topical daily  dextrose 40% Gel 15 Gram(s) Oral once  dextrose 5%. 1000 milliLiter(s) (50 mL/Hr) IV Continuous <Continuous>  dextrose 5%. 1000 milliLiter(s) (100 mL/Hr) IV Continuous <Continuous>  dextrose 50% Injectable 25 Gram(s) IV Push once  dextrose 50% Injectable 12.5 Gram(s) IV Push once  dextrose 50% Injectable 25 Gram(s) IV Push once  diltiazem    Tablet 30 milliGRAM(s) Oral four times a day  epoetin danilo-epbx (RETACRIT) Injectable 24688 Unit(s) IV Push <User Schedule>  glucagon  Injectable 1 milliGRAM(s) IntraMuscular once  heparin  Infusion. 1000 Unit(s)/Hr (10 mL/Hr) IV Continuous <Continuous>  insulin lispro (ADMELOG) corrective regimen sliding scale   SubCutaneous three times a day before meals  ketamine Infusion. 0.25 mG/kG/Hr (2.16 mL/Hr) IV Continuous <Continuous>  norepinephrine Infusion 0.25 MICROgram(s)/kG/Min (40.4 mL/Hr) IV Continuous <Continuous>  pantoprazole  Injectable 40 milliGRAM(s) IV Push every 12 hours  propofol Infusion 10 MICROgram(s)/kG/Min (5.17 mL/Hr) IV Continuous <Continuous>    MEDICATIONS  (PRN):  acetaminophen   Tablet .. 1000 milliGRAM(s) Oral every 6 hours PRN Temp greater or equal to 38C (100.4F), Mild Pain (1 - 3), Moderate Pain (4 - 6)  calamine/zinc oxide Lotion 1 Application(s) Topical three times a day PRN Itching      ALLERGIES:  Allergies    lisinopril (Other)  opioid-like analgesics (Other)    Intolerances        LABS:                        9.1    13.95 )-----------( 220      ( 13 Jul 2021 02:50 )             30.6     07-13    131<L>  |  92<L>  |  24<H>  ----------------------------<  201<H>  3.6   |  18<L>  |  6.40<H>    Ca    9.9      13 Jul 2021 02:50  Phos  5.9     07-13  Mg     2.80     07-13    TPro  5.7<L>  /  Alb  3.0<L>  /  TBili  0.5  /  DBili  x   /  AST  24  /  ALT  5   /  AlkPhos  92  07-13    PT/INR - ( 13 Jul 2021 02:50 )   PT: 16.2 sec;   INR: 1.45 ratio         PTT - ( 13 Jul 2021 02:50 )  PTT:69.6 sec      RADIOLOGY & ADDITIONAL TESTS: Reviewed. Mainor Bowers  PGY-1 | Internal Medicine    INTERVAL HPI/OVERNIGHT EVENTS: Around 1:55 AM last night, patient was found to be in asystole. CPR was initiated at that time. Patient required 3 doses of epi. His rhythm converted to VTach and patient received shock x2 after which he converted to NSR. ROSC was achieved after 10 minutes of down time.     SUBJECTIVE: Patient seen and examined at bedside.     CONSTITUTIONAL: No weakness, fevers or chills  EYES/ENT: No visual changes;  No vertigo or throat pain   NECK: No pain or stiffness  RESPIRATORY: No cough, wheezing, hemoptysis; No shortness of breath  CARDIOVASCULAR: No chest pain or palpitations  GASTROINTESTINAL: No abdominal or epigastric pain. No nausea, vomiting, or hematemesis; No diarrhea or constipation. No melena or hematochezia.  GENITOURINARY: No dysuria, frequency or hematuria  NEUROLOGICAL: No numbness or weakness  SKIN: No itching, rashes    OBJECTIVE:    VITAL SIGNS:  ICU Vital Signs Last 24 Hrs  T(C): 37.3 (13 Jul 2021 08:00), Max: 37.3 (13 Jul 2021 08:00)  T(F): 99.2 (13 Jul 2021 08:00), Max: 99.2 (13 Jul 2021 08:00)  HR: 73 (13 Jul 2021 11:13) (58 - 125)  BP: 153/29 (13 Jul 2021 11:00) (84/41 - 174/45)  BP(mean): 59 (13 Jul 2021 11:00) (35 - 111)  ABP: --  ABP(mean): --  RR: 24 (13 Jul 2021 11:00) (13 - 32)  SpO2: 99% (13 Jul 2021 11:13) (57% - 100%)    Mode: AC/ CMV (Assist Control/ Continuous Mandatory Ventilation), RR (machine): 24, TV (machine): 450, FiO2: 40, PEEP: 5, MAP: 12, PIP: 26    07-12 @ 07:01  -  07-13 @ 07:00  --------------------------------------------------------  IN: 1367.9 mL / OUT: 500 mL / NET: 867.9 mL    07-13 @ 07:01  -  07-13 @ 12:01  --------------------------------------------------------  IN: 298.3 mL / OUT: 0 mL / NET: 298.3 mL      CAPILLARY BLOOD GLUCOSE      POCT Blood Glucose.: 170 mg/dL (13 Jul 2021 06:05)      PHYSICAL EXAM:    General: NAD  HEENT: NC/AT; PERRL, clear conjunctiva  Neck: supple  Respiratory: CTA b/l  Cardiovascular: +S1/S2; RRR  Abdomen: soft, NT/ND; +BS x4  Extremities: WWP, 2+ peripheral pulses b/l; no LE edema  Skin: normal color and turgor; no rash  Neurological:    MEDICATIONS:  MEDICATIONS  (STANDING):  aspirin  chewable 81 milliGRAM(s) Oral daily  atorvastatin 40 milliGRAM(s) Oral at bedtime  chlorhexidine 0.12% Liquid 15 milliLiter(s) Oral Mucosa every 12 hours  chlorhexidine 2% Cloths 1 Application(s) Topical daily  dextrose 40% Gel 15 Gram(s) Oral once  dextrose 5%. 1000 milliLiter(s) (50 mL/Hr) IV Continuous <Continuous>  dextrose 5%. 1000 milliLiter(s) (100 mL/Hr) IV Continuous <Continuous>  dextrose 50% Injectable 25 Gram(s) IV Push once  dextrose 50% Injectable 12.5 Gram(s) IV Push once  dextrose 50% Injectable 25 Gram(s) IV Push once  diltiazem    Tablet 30 milliGRAM(s) Oral four times a day  epoetin danilo-epbx (RETACRIT) Injectable 78434 Unit(s) IV Push <User Schedule>  glucagon  Injectable 1 milliGRAM(s) IntraMuscular once  heparin  Infusion. 1000 Unit(s)/Hr (10 mL/Hr) IV Continuous <Continuous>  insulin lispro (ADMELOG) corrective regimen sliding scale   SubCutaneous three times a day before meals  ketamine Infusion. 0.25 mG/kG/Hr (2.16 mL/Hr) IV Continuous <Continuous>  norepinephrine Infusion 0.25 MICROgram(s)/kG/Min (40.4 mL/Hr) IV Continuous <Continuous>  pantoprazole  Injectable 40 milliGRAM(s) IV Push every 12 hours  propofol Infusion 10 MICROgram(s)/kG/Min (5.17 mL/Hr) IV Continuous <Continuous>    MEDICATIONS  (PRN):  acetaminophen   Tablet .. 1000 milliGRAM(s) Oral every 6 hours PRN Temp greater or equal to 38C (100.4F), Mild Pain (1 - 3), Moderate Pain (4 - 6)  calamine/zinc oxide Lotion 1 Application(s) Topical three times a day PRN Itching      ALLERGIES:  Allergies    lisinopril (Other)  opioid-like analgesics (Other)    Intolerances        LABS:                        9.1    13.95 )-----------( 220      ( 13 Jul 2021 02:50 )             30.6     07-13    131<L>  |  92<L>  |  24<H>  ----------------------------<  201<H>  3.6   |  18<L>  |  6.40<H>    Ca    9.9      13 Jul 2021 02:50  Phos  5.9     07-13  Mg     2.80     07-13    TPro  5.7<L>  /  Alb  3.0<L>  /  TBili  0.5  /  DBili  x   /  AST  24  /  ALT  5   /  AlkPhos  92  07-13    PT/INR - ( 13 Jul 2021 02:50 )   PT: 16.2 sec;   INR: 1.45 ratio         PTT - ( 13 Jul 2021 02:50 )  PTT:69.6 sec      RADIOLOGY & ADDITIONAL TESTS: Reviewed. Mainor Bowers  PGY-1 | Internal Medicine    INTERVAL HPI/OVERNIGHT EVENTS: Around 1:55 AM last night, patient was found to be in asystole. CPR was initiated at that time. Patient required 3 doses of epi. His rhythm converted to VTach and patient received shock x2, after which he converted to NSR. ROSC was achieved after 10 minutes of down time.     Patient was intubated and sedated. Patient is stable now in NSR    SUBJECTIVE: Patient seen and examined at bedside. Intubated and sedated    ROS: unable to asses    OBJECTIVE:    VITAL SIGNS:  ICU Vital Signs Last 24 Hrs  T(C): 37.3 (13 Jul 2021 08:00), Max: 37.3 (13 Jul 2021 08:00)  T(F): 99.2 (13 Jul 2021 08:00), Max: 99.2 (13 Jul 2021 08:00)  HR: 73 (13 Jul 2021 11:13) (58 - 125)  BP: 153/29 (13 Jul 2021 11:00) (84/41 - 174/45)  BP(mean): 59 (13 Jul 2021 11:00) (35 - 111)  RR: 24 (13 Jul 2021 11:00) (13 - 32)  SpO2: 99% (13 Jul 2021 11:13) (57% - 100%)    Mode: AC/ CMV (Assist Control/ Continuous Mandatory Ventilation), RR (machine): 24, TV (machine): 450, FiO2: 40, PEEP: 5, MAP: 12, PIP: 26    07-12 @ 07:01  -  07-13 @ 07:00  --------------------------------------------------------  IN: 1367.9 mL / OUT: 500 mL / NET: 867.9 mL    07-13 @ 07:01  -  07-13 @ 12:01  --------------------------------------------------------  IN: 298.3 mL / OUT: 0 mL / NET: 298.3 mL      CAPILLARY BLOOD GLUCOSE      POCT Blood Glucose.: 170 mg/dL (13 Jul 2021 06:05)      PHYSICAL EXAM:    General: NAD.  HEENT: NC/AT; PERRL, clear conjunctiva  Neck: supple  Respiratory: CTA b/l  Cardiovascular: +S1/S2; RRR  Abdomen: soft, NT/ND; +BS x4  Extremities: WWP, 2+ peripheral pulses b/l; no LE edema  Skin: normal color and turgor; no rash  Neurological:  Patient in intubated and sedated    MEDICATIONS:  MEDICATIONS  (STANDING):  aspirin  chewable 81 milliGRAM(s) Oral daily  atorvastatin 40 milliGRAM(s) Oral at bedtime  chlorhexidine 0.12% Liquid 15 milliLiter(s) Oral Mucosa every 12 hours  chlorhexidine 2% Cloths 1 Application(s) Topical daily  dextrose 40% Gel 15 Gram(s) Oral once  dextrose 5%. 1000 milliLiter(s) (50 mL/Hr) IV Continuous <Continuous>  dextrose 5%. 1000 milliLiter(s) (100 mL/Hr) IV Continuous <Continuous>  dextrose 50% Injectable 25 Gram(s) IV Push once  dextrose 50% Injectable 12.5 Gram(s) IV Push once  dextrose 50% Injectable 25 Gram(s) IV Push once  diltiazem    Tablet 30 milliGRAM(s) Oral four times a day  epoetin danilo-epbx (RETACRIT) Injectable 70419 Unit(s) IV Push <User Schedule>  glucagon  Injectable 1 milliGRAM(s) IntraMuscular once  heparin  Infusion. 1000 Unit(s)/Hr (10 mL/Hr) IV Continuous <Continuous>  insulin lispro (ADMELOG) corrective regimen sliding scale   SubCutaneous three times a day before meals  ketamine Infusion. 0.25 mG/kG/Hr (2.16 mL/Hr) IV Continuous <Continuous>  norepinephrine Infusion 0.25 MICROgram(s)/kG/Min (40.4 mL/Hr) IV Continuous <Continuous>  pantoprazole  Injectable 40 milliGRAM(s) IV Push every 12 hours  propofol Infusion 10 MICROgram(s)/kG/Min (5.17 mL/Hr) IV Continuous <Continuous>    MEDICATIONS  (PRN):  acetaminophen   Tablet .. 1000 milliGRAM(s) Oral every 6 hours PRN Temp greater or equal to 38C (100.4F), Mild Pain (1 - 3), Moderate Pain (4 - 6)  calamine/zinc oxide Lotion 1 Application(s) Topical three times a day PRN Itching      ALLERGIES:  Allergies    lisinopril (Other)  opioid-like analgesics (Other)    Intolerances        LABS:                        9.1    13.95 )-----------( 220      ( 13 Jul 2021 02:50 )             30.6     07-13    131<L>  |  92<L>  |  24<H>  ----------------------------<  201<H>  3.6   |  18<L>  |  6.40<H>    Ca    9.9      13 Jul 2021 02:50  Phos  5.9     07-13  Mg     2.80     07-13    TPro  5.7<L>  /  Alb  3.0<L>  /  TBili  0.5  /  DBili  x   /  AST  24  /  ALT  5   /  AlkPhos  92  07-13    PT/INR - ( 13 Jul 2021 02:50 )   PT: 16.2 sec;   INR: 1.45 ratio         PTT - ( 13 Jul 2021 02:50 )  PTT:69.6 sec      RADIOLOGY & ADDITIONAL TESTS: Reviewed.

## 2021-07-13 NOTE — CHART NOTE - NSCHARTNOTEFT_GEN_A_CORE
Chest xray stable this am following PTC removal  by MICU team   No other plans for Thoracic surgical intervention  Cont Care per MICU team  Will sign off at this time.  Re-call with any new questions or concerns.

## 2021-07-13 NOTE — PROCEDURE NOTE - NSTRACHPOSTINTU_RESP_A_CORE
Appropriate capnography/Breath sounds bilateral/Chest excursion noted/Chest X-Ray/Positive end tidal Co2 noted

## 2021-07-13 NOTE — CHART NOTE - NSCHARTNOTEFT_GEN_A_CORE
: Valencia Sanchez     INDICATION: hypotension     PROCEDURE:  [x] LIMITED ECHO  [x ] LIMITED CHEST  [ ] LIMITED RETROPERITONEAL  [ ] LIMITED ABDOMINAL  [ ] LIMITED DVT  [ ] NEEDLE GUIDANCE VASCULAR  [ ] NEEDLE GUIDANCE THORACENTESIS  [ ] NEEDLE GUIDANCE PARACENTESIS  [ ] NEEDLE GUIDANCE PERICARDIOCENTESIS  [ ] OTHER    FINDINGS:  A lines on anterior lung fields. Left lower lobe consolidation with possible effusion.   Enlarged RV, VTI 13.2 cm, LVOT diameter 1.9 cm with CO 2.8 cm      INTERPRETATION: Decreased CO, enlarged RV, possible left lower lobe consolidation    Images stored on path.    Valencia Sanchez, PGY-2

## 2021-07-13 NOTE — PROGRESS NOTE ADULT - SUBJECTIVE AND OBJECTIVE BOX
Nephrology Followup Note - [phone] - Dr Mendiola / Dr Knight / Dr Macias / Dr Arroyo / Dr Mackey / Dr Bowen / Dr Esposito / Dr Calle  Pt seen and examined at bedside      Allergies:  lisinopril (Other)  opioid-like analgesics (Other)    Hospital Medications:   MEDICATIONS  (STANDING):  aspirin  chewable 81 milliGRAM(s) Oral daily  atorvastatin 40 milliGRAM(s) Oral at bedtime  chlorhexidine 0.12% Liquid 15 milliLiter(s) Oral Mucosa every 12 hours  chlorhexidine 2% Cloths 1 Application(s) Topical daily  dextrose 40% Gel 15 Gram(s) Oral once  dextrose 5%. 1000 milliLiter(s) (50 mL/Hr) IV Continuous <Continuous>  dextrose 5%. 1000 milliLiter(s) (100 mL/Hr) IV Continuous <Continuous>  dextrose 50% Injectable 25 Gram(s) IV Push once  dextrose 50% Injectable 12.5 Gram(s) IV Push once  dextrose 50% Injectable 25 Gram(s) IV Push once  diltiazem    Tablet 30 milliGRAM(s) Oral four times a day  epoetin danilo-epbx (RETACRIT) Injectable 49724 Unit(s) IV Push <User Schedule>  glucagon  Injectable 1 milliGRAM(s) IntraMuscular once  heparin  Infusion. 1000 Unit(s)/Hr (10 mL/Hr) IV Continuous <Continuous>  insulin lispro (ADMELOG) corrective regimen sliding scale   SubCutaneous three times a day before meals  ketamine Infusion. 0.25 mG/kG/Hr (2.16 mL/Hr) IV Continuous <Continuous>  norepinephrine Infusion 0.25 MICROgram(s)/kG/Min (40.4 mL/Hr) IV Continuous <Continuous>  pantoprazole  Injectable 40 milliGRAM(s) IV Push every 12 hours  propofol Infusion 10 MICROgram(s)/kG/Min (5.17 mL/Hr) IV Continuous <Continuous>    REVIEW OF SYSTEMS:  CONSTITUTIONAL: No weakness, fevers or chills  EYES/ENT: No visual changes;  No vertigo or throat pain   NECK: No pain or stiffness  RESPIRATORY: No cough, wheezing, hemoptysis; No shortness of breath  CARDIOVASCULAR: No chest pain or palpitations.  GASTROINTESTINAL: No abdominal or epigastric pain. No nausea, vomiting, or hematemesis; No diarrhea or constipation. No melena or hematochezia.  GENITOURINARY: No dysuria, frequency, foamy urine, urinary urgency, incontinence or hematuria  NEUROLOGICAL: No numbness or weakness  SKIN: No itching, burning, rashes, or lesions   VASCULAR: No bilateral lower extremity edema.   All other review of systems is negative unless indicated above.    VITALS:  T(F): 99.2 (07-13-21 @ 08:00), Max: 99.2 (07-13-21 @ 08:00)  HR: 73 (07-13-21 @ 11:13)  BP: 152/39 (07-13-21 @ 10:00)  RR: 24 (07-13-21 @ 10:00)  SpO2: 99% (07-13-21 @ 11:13)  Wt(kg): --    07-12 @ 07:01  -  07-13 @ 07:00  --------------------------------------------------------  IN: 1367.9 mL / OUT: 500 mL / NET: 867.9 mL    07-13 @ 07:01  -  07-13 @ 11:25  --------------------------------------------------------  IN: 222.8 mL / OUT: 0 mL / NET: 222.8 mL        PHYSICAL EXAM:  Constitutional: NAD  HEENT: anicteric sclera, oropharynx clear, MMM  Neck: No JVD  Respiratory: CTAB, no wheezes, rales or rhonchi  Cardiovascular: S1, S2, RRR  Gastrointestinal: BS+, soft, NT/ND  Extremities: No cyanosis or clubbing. No peripheral edema  Neurological: A/O x 3, no focal deficits  Psychiatric: Normal mood, normal affect  : No CVA tenderness. No wagner.   Skin: No rashes  Vascular Access:    LABS:  07-13    131<L>  |  92<L>  |  24<H>  ----------------------------<  201<H>  3.6   |  18<L>  |  6.40<H>    Ca    9.9      13 Jul 2021 02:50  Phos  5.9     07-13  Mg     2.80     07-13    TPro  5.7<L>  /  Alb  3.0<L>  /  TBili  0.5  /  DBili      /  AST  24  /  ALT  5   /  AlkPhos  92  07-13    Creatinine Trend: 6.40 <--, 6.05 <--, 8.49 <--, 8.56 <--, 7.83 <--, 5.72 <--, 6.42 <--, 6.10 <--, TNP <--, 4.65 <--, 7.48 <--                        9.1    13.95 )-----------( 220      ( 13 Jul 2021 02:50 )             30.6     Urine Studies:      RADIOLOGY & ADDITIONAL STUDIES:   Nephrology Followup Note - [phone] - Dr Mendiola / Dr Knight / Dr Macias / Dr Arroyo / Dr Mackey / Dr Bowen / Dr Esposito / Dr Calle  Pt seen and examined at bedside  Overnight events noted. Pt had cardiac arrest, asystole. Intubated. ROSC after approximately 10 mins.     Allergies:  lisinopril (Other)  opioid-like analgesics (Other)    Hospital Medications:   MEDICATIONS  (STANDING):  aspirin  chewable 81 milliGRAM(s) Oral daily  atorvastatin 40 milliGRAM(s) Oral at bedtime  chlorhexidine 0.12% Liquid 15 milliLiter(s) Oral Mucosa every 12 hours  chlorhexidine 2% Cloths 1 Application(s) Topical daily  dextrose 40% Gel 15 Gram(s) Oral once  dextrose 5%. 1000 milliLiter(s) (50 mL/Hr) IV Continuous <Continuous>  dextrose 5%. 1000 milliLiter(s) (100 mL/Hr) IV Continuous <Continuous>  dextrose 50% Injectable 25 Gram(s) IV Push once  dextrose 50% Injectable 12.5 Gram(s) IV Push once  dextrose 50% Injectable 25 Gram(s) IV Push once  diltiazem    Tablet 30 milliGRAM(s) Oral four times a day  epoetin danilo-epbx (RETACRIT) Injectable 14473 Unit(s) IV Push <User Schedule>  glucagon  Injectable 1 milliGRAM(s) IntraMuscular once  heparin  Infusion. 1000 Unit(s)/Hr (10 mL/Hr) IV Continuous <Continuous>  insulin lispro (ADMELOG) corrective regimen sliding scale   SubCutaneous three times a day before meals  ketamine Infusion. 0.25 mG/kG/Hr (2.16 mL/Hr) IV Continuous <Continuous>  norepinephrine Infusion 0.25 MICROgram(s)/kG/Min (40.4 mL/Hr) IV Continuous <Continuous>  pantoprazole  Injectable 40 milliGRAM(s) IV Push every 12 hours  propofol Infusion 10 MICROgram(s)/kG/Min (5.17 mL/Hr) IV Continuous <Continuous>    VITALS:  T(F): 99.2 (07-13-21 @ 08:00), Max: 99.2 (07-13-21 @ 08:00)  HR: 73 (07-13-21 @ 11:13)  BP: 152/39 (07-13-21 @ 10:00)  RR: 24 (07-13-21 @ 10:00)  SpO2: 99% (07-13-21 @ 11:13)  Wt(kg): --    07-12 @ 07:01  -  07-13 @ 07:00  --------------------------------------------------------  IN: 1367.9 mL / OUT: 500 mL / NET: 867.9 mL    07-13 @ 07:01  -  07-13 @ 11:25  --------------------------------------------------------  IN: 222.8 mL / OUT: 0 mL / NET: 222.8 mL        PHYSICAL EXAM:  Constitutional: NAD  HEENT: ETT  Neck: No JVD  Respiratory: CTAB, no wheezes, rales or rhonchi  Cardiovascular: S1, S2, RRR  Gastrointestinal: BS+, soft, NT/ND  Extremities: No cyanosis or clubbing. No peripheral edema  Neurological: sedated  : No CVA tenderness. No wagner.   Skin: No rashes  Vascular Access: LUE AV access +thrill and bruit.     LABS:  07-13    131<L>  |  92<L>  |  24<H>  ----------------------------<  201<H>  3.6   |  18<L>  |  6.40<H>    Ca    9.9      13 Jul 2021 02:50  Phos  5.9     07-13  Mg     2.80     07-13    TPro  5.7<L>  /  Alb  3.0<L>  /  TBili  0.5  /  DBili      /  AST  24  /  ALT  5   /  AlkPhos  92  07-13    Creatinine Trend: 6.40 <--, 6.05 <--, 8.49 <--, 8.56 <--, 7.83 <--, 5.72 <--, 6.42 <--, 6.10 <--, TNP <--, 4.65 <--, 7.48 <--                        9.1    13.95 )-----------( 220      ( 13 Jul 2021 02:50 )             30.6     Urine Studies:      RADIOLOGY & ADDITIONAL STUDIES:  < from: Xray Chest 1 View- PORTABLE-Urgent (Xray Chest 1 View- PORTABLE-Urgent .) (07.13.21 @ 05:35) >  IMPRESSION:  Enteric tube extends into the abdomen on the most recent image. The tip is not seen on the image. The side-port is again near the GE junction. Recommend advancing the enteric tube. Discussed with Dr. Ballard with read back at 9:36 AM on July 13, 2021 by Dr. Knott.    Left IJ line with tip in distal left brachiocephalic vein. No pneumothorax.    ET tube tip is 3.5 cm above the joshua.    Loculated left pleural effusion with likely associated passive atelectasis, not significantly changed.    Right upper lung opacity, not significantly changed. Increased patchy right lower lung opacity and new patchy left upper lung opacity, on the most recent image. Findings are nonspecific but could be related to atelectasis or multifocal pneumonia.    < end of copied text >

## 2021-07-13 NOTE — CONSULT NOTE ADULT - ASSESSMENT
Assessment: 69 yo male with a PMHx of CAD (s/p CABG), ESRD (on HD, MWF), DMII, HTN, atrial fibrillation (on coumadin), and anemia presented from [facility] on 07/03 for evaluation of chest pain and GIB. Hospital course complicated by cardiac arrest on 07/13 at 0155. ROSC achieved after 10 minutes, patient received Epi x3 and shock x2. Neurology was consulted for 07/13 post-arrest CTH showing foci of low density in the R anterolateral frontal lobe and L posterior temporal lobe concerning for acute infarcts. Patient intubated and sedated on Propofol 50mcg and Ketamine 1.5mcg. Neuro exam significantly limited by sedation.    Impression: Acute infarcts in at least 2 separate vascular territories found on CTH after cardiac arrest. Mechanism likely cardioembolic in setting of patient with known atrial fibrillation with cardiac arrest.    Recommendations:  [] MRI brain w/o contrast when able, although unlikely to  since patient has known atrial fibrillation and will need to be continued of full dose anticoagulation.  [] Would obtain vessel imaging when possible, MRA H w/o contrast + b/l carotid dopplers.  [] c/w heparin drip for now, would transition to DOAC if kidney function allows - otherwise will need to be transitioned back to Coumadin.  [] ASA per cardiology, does not need from neurology standpoint as long as patient is fully anticoagulated.  [] Avoid hypotension, keep -180 for now if no contraindication from cardiology standpoint.  [] c/w Atorvastatin 40MG QHS for secondary stroke prevention.  [x] 07/04 A1c 5.5  [] Would send lipid panel.  [x] 07/13 TTE: Severe pulmonary HTN.  [] Wean from sedation per ICU protocol.  [] PT/OT/SS when able.  [] Rest of care per ICU team.    Case to be discussed with stroke attending Dr. Ballesteros.

## 2021-07-13 NOTE — PROGRESS NOTE ADULT - ATTENDING COMMENTS
Agree with above. Seen and examined with team on rounds. Critically ill requiring frequent bedside visits. Intubated overnight after a code last night. The patient had a bradycardic event while using the toilet last night. This progressed into a Vtach and V fib event  that requiring shock x 2 and intubation. Will discuss events with cardiology. Son was at the bedside and was updated on the events of last night this am. Supportive care. Supportive care.

## 2021-07-13 NOTE — PROGRESS NOTE ADULT - ASSESSMENT
70 year old male with possible GI bleed     Anemia  negative occult w/o overt gi bleeding  Monitor CBC and Keep hemoglobin > 8 given cardiac issues   PPI BID w/Maalox PRN    No GI objection to ASA or Heparin gtt   Monitor stool color  no urgent endoscopic evaluation as H/H stable and negative occult    Cardiac Arrest  Care per cardiology and MICU appreciated     Pleural Effusion:  s/p chest tube   care per CTSx appreciated     Advanced care planning was discussed with patient.  Advanced care planning forms were reviewed and discussed.  Risks, benefits and alternatives of gastroenterologic procedures were discussed in detail and all questions were answered.  30 minutes spent.

## 2021-07-13 NOTE — CHART NOTE - NSCHARTNOTEFT_GEN_A_CORE
Spoke with pt's son and daughter bedside and updated them on the day's events and preliminary findings on CTH noncon and CTCAP.  Son demanded to speak to the patient's cardiologist before consenting to TRUMAN.  When told that I had already called neurology for the CTH findings, the pt's sone also said wants nothing to be done until neurology sees the pt.      Kenny Pelayo MD  Internal Medicine, PGY3  [facility]/[place]  pager: [phone] Spoke with pt's son and daughter bedside and updated them on the day's events and preliminary findings on CTH noncon and CTCAP.  Son demanded to speak to the patient's cardiologist before consenting to TRUMAN.  When told that I had already called neurology for the CTH findings, the pt's son also said wants nothing to be done until neurology sees the pt.      Kenny Pelayo MD  Internal Medicine, PGY3  [facility]/[place]  pager: [phone]

## 2021-07-13 NOTE — PROGRESS NOTE ADULT - ASSESSMENT
69 yo M with PMhx of CAD s/p CABG (course at that time complicated by pleural effusion requiring chest tubes), ESRD on HD (MWF), DMII, HTN, afib on coumadin, and anemia presented from [facility] for evaluation of chest pain and GIB. Renal following for ESRD Mx.    ESRD  Maintenance schedule MWF  Pt dialyzed yesterday without acute event.   In light of worsening acidosis, 2/2 to cardiac arrest, will dialyze again today.   Plan for zero UF, will likely need continued pressor support during HD.   dose all meds for ESRD    Anemia in CKD+GIB- Hb <goal  c/w Epo 20k tiw with HD  monitor h/h, neg FOBT  GI following, No plans for cscope.     labs, chart reviewed    Rudy Mendiola MD  [facility]  Office [phone]  Ans Serv [phone]  Cell - [phone]

## 2021-07-13 NOTE — CONSULT NOTE ADULT - SUBJECTIVE AND OBJECTIVE BOX
VIOLETTA RENTERIA  Male  MRN-[id number]    HPI: Per primary team -  "69 yo M with PMhx of CAD s/p CABG (course at that time complicated by pleural effusion requiring chest tubes), ESRD on HD (MWF), DMII, HTN, afib on coumadin, and anemia presented from [facility] for evaluation of chest pain and GIB. Patient is AAOX2 and was able to provide some information. Most of the information was obtained from charting. Patient was admitted to [facility] after developing chest pain during HD. He was found to have Hgb of 6 and elevated trops concerning for GIB and NSTEM II. He was give 2 units of pRBCs and monitored off warfarin/aspirin. He underwent bowel prep but it did not show any bloody stool. As a result, no colonoscopy was performed. His course was complicated by aflutter/afib with RVR. He was managed with metoprolol and never required cardioversion. On day of transfer, his Hgb was in the 8s. Patient was deemed stable enough to be restarted on aspirin and warfarin. Patient was transferred to [facility] for further evaluation. Currently, patient have no symptoms but reports to have intermittent chest pain, that is suprasternal, non-radiating, exertional at times, and non-reproducible on palpation. it improves with rest and worsens with movement. He denies any fever, chills, cough, orthopnea, PND, LE edema, abdominal pain, diarrhea, or dysuria.    On the floor, vitals are WNL."    Neurology was consulted for 07/13 CTH showing foci of low density in the R anterolateral frontal lobe and L posterior temporal lobe concerning for acute infarcts. Patient's hospital course was complicated by cardiac arrest on 07/13 at 0155. ROSC achieved after 10 minutes, patient received Epi x3 and shock x2. Patient intubated and sedated on Propofol 50mcg and Ketamine 1.5mcg. Neuro exam significantly limited by sedation.    NIHSS: 30 (exam limited by sedation)  MRS: 0-1    ROS: Unable to obtain, patient intubated and sedated.    PAST MEDICAL & SURGICAL HISTORY:  HTN (Hypertension)    DM (Diabetes Mellitus)  x 4 yrs without N/N/R    CAD (Coronary Artery Disease)    Hypercholesterolemia    Coronary Stent  CABG in 2019    Heart Attack  2/1/07 with subsequent stent    H/O: CVA  after cardiac stent placed 12/15/09 -no residual    Hyperthyroidism    Stented coronary artery  total 15 stents from 0724-3259    ESRD (end stage renal disease) on dialysis  MWF    Boil of Buttock  2006 and 2008    S/P cataract extraction    Hemodialysis access, AV graft  5/2015, L arm    S/P CABG x 4    MEDICATIONS  (STANDING):  aspirin  chewable 81 milliGRAM(s) Oral daily  atorvastatin 40 milliGRAM(s) Oral at bedtime  chlorhexidine 0.12% Liquid 15 milliLiter(s) Oral Mucosa every 12 hours  chlorhexidine 2% Cloths 1 Application(s) Topical daily  dextrose 40% Gel 15 Gram(s) Oral once  dextrose 5%. 1000 milliLiter(s) (50 mL/Hr) IV Continuous <Continuous>  dextrose 5%. 1000 milliLiter(s) (100 mL/Hr) IV Continuous <Continuous>  dextrose 50% Injectable 25 Gram(s) IV Push once  dextrose 50% Injectable 12.5 Gram(s) IV Push once  dextrose 50% Injectable 25 Gram(s) IV Push once  diltiazem    Tablet 30 milliGRAM(s) Oral four times a day  epoetin danilo-epbx (RETACRIT) Injectable 87823 Unit(s) IV Push <User Schedule>  glucagon  Injectable 1 milliGRAM(s) IntraMuscular once  heparin  Infusion. 1000 Unit(s)/Hr (10 mL/Hr) IV Continuous <Continuous>  insulin lispro (ADMELOG) corrective regimen sliding scale   SubCutaneous three times a day before meals  ketamine Infusion. 0.25 mG/kG/Hr (2.16 mL/Hr) IV Continuous <Continuous>  norepinephrine Infusion 0.25 MICROgram(s)/kG/Min (40.4 mL/Hr) IV Continuous <Continuous>  pantoprazole  Injectable 40 milliGRAM(s) IV Push every 12 hours  propofol Infusion 10 MICROgram(s)/kG/Min (5.17 mL/Hr) IV Continuous <Continuous>    MEDICATIONS  (PRN):  acetaminophen   Tablet .. 1000 milliGRAM(s) Oral every 6 hours PRN Temp greater or equal to 38C (100.4F), Mild Pain (1 - 3), Moderate Pain (4 - 6)  calamine/zinc oxide Lotion 1 Application(s) Topical three times a day PRN Itching    Allergies    lisinopril (Other)  opioid-like analgesics (Other)    Vital Signs Last 24 Hrs  T(C): 36.2 (13 Jul 2021 16:00), Max: 37.3 (13 Jul 2021 08:00)  T(F): 97.1 (13 Jul 2021 16:00), Max: 99.2 (13 Jul 2021 08:00)  HR: 65 (13 Jul 2021 19:54) (58 - 125)  BP: 158/37 (13 Jul 2021 19:00) (84/41 - 174/45)  BP(mean): 64 (13 Jul 2021 19:00) (35 - 91)  RR: 24 (13 Jul 2021 19:00) (13 - 32)  SpO2: 99% (13 Jul 2021 19:54) (57% - 100%)    General Exam:  Constitutional: Intubated and sedated.  Head: Normocephalic, atraumatic.  Respiratory: Ventilated at 24bpm, not overbreathing vent.    Neuro Exam:   MS: Eyes closed, do not open to verbal/noxious stimuli. Intubated and sedated, no verbal output. Does not follow commands.  CN: Pupils 2mm bilaterally and sluggish. Corneal reflex absent b/l. Eyes in primary gaze, do not cross midline with OCR. Face grossly symmetric. No gag.  Motor: No spontaneous movement noted, no withdrawal to noxious stimuli.  Sensory: No grimace to noxious stimuli x4.  Reflexes: Absent throughout. Toes mute bilaterally.  Coordination/Gait: Unable to assess.    LABS:               9.1    13.95 )-----------( 220      ( 13 Jul 2021 02:50 )             30.6     07-13    131<L>  |  92<L>  |  24<H>  ----------------------------<  201<H>  3.6   |  18<L>  |  6.40<H>    Ca    9.9      13 Jul 2021 02:50  Phos  5.9     07-13  Mg     2.80     07-13    TPro  5.7<L>  /  Alb  3.0<L>  /  TBili  0.5  /  DBili  x   /  AST  24  /  ALT  5   /  AlkPhos  92  07-13    PT/INR - ( 13 Jul 2021 02:50 )   PT: 16.2 sec;   INR: 1.45 ratio      PTT - ( 13 Jul 2021 02:50 )  PTT:69.6 sec    RADIOLOGY:    -07/13 CTH: Foci of low density in the right anterolateral frontal lobe and left posterior temporal lobe are suspicious for the presence of acute ischemic changes. No CT evidence for hemorrhagic transformation. Chronic ischemic changes.

## 2021-07-14 LAB
ALBUMIN SERPL ELPH-MCNC: 2.5 G/DL — LOW (ref 3.3–5)
ALP SERPL-CCNC: 82 U/L — SIGNIFICANT CHANGE UP (ref 40–120)
ALT FLD-CCNC: <5 U/L — LOW (ref 4–41)
ANION GAP SERPL CALC-SCNC: 22 MMOL/L — HIGH (ref 7–14)
APTT BLD: 79.5 SEC — HIGH (ref 27–36.3)
AST SERPL-CCNC: 21 U/L — SIGNIFICANT CHANGE UP (ref 4–40)
BASOPHILS # BLD AUTO: 0.11 K/UL — SIGNIFICANT CHANGE UP (ref 0–0.2)
BASOPHILS NFR BLD AUTO: 0.8 % — SIGNIFICANT CHANGE UP (ref 0–2)
BILIRUB SERPL-MCNC: 0.5 MG/DL — SIGNIFICANT CHANGE UP (ref 0.2–1.2)
BUN SERPL-MCNC: 20 MG/DL — SIGNIFICANT CHANGE UP (ref 7–23)
CALCIUM SERPL-MCNC: 8.1 MG/DL — LOW (ref 8.4–10.5)
CHLORIDE SERPL-SCNC: 92 MMOL/L — LOW (ref 98–107)
CK SERPL-CCNC: 29 U/L — LOW (ref 30–200)
CO2 SERPL-SCNC: 18 MMOL/L — LOW (ref 22–31)
CREAT SERPL-MCNC: 5.18 MG/DL — HIGH (ref 0.5–1.3)
CULTURE RESULTS: SIGNIFICANT CHANGE UP
EOSINOPHIL # BLD AUTO: 0.32 K/UL — SIGNIFICANT CHANGE UP (ref 0–0.5)
EOSINOPHIL NFR BLD AUTO: 2.3 % — SIGNIFICANT CHANGE UP (ref 0–6)
GLUCOSE BLDC GLUCOMTR-MCNC: 172 MG/DL — HIGH (ref 70–99)
GLUCOSE BLDC GLUCOMTR-MCNC: 180 MG/DL — HIGH (ref 70–99)
GLUCOSE BLDC GLUCOMTR-MCNC: 184 MG/DL — HIGH (ref 70–99)
GLUCOSE BLDC GLUCOMTR-MCNC: 186 MG/DL — HIGH (ref 70–99)
GLUCOSE SERPL-MCNC: 159 MG/DL — HIGH (ref 70–99)
HCT VFR BLD CALC: 29.2 % — LOW (ref 39–50)
HGB BLD-MCNC: 9 G/DL — LOW (ref 13–17)
IANC: 9.66 K/UL — HIGH (ref 1.5–8.5)
IMM GRANULOCYTES NFR BLD AUTO: 5 % — HIGH (ref 0–1.5)
LYMPHOCYTES # BLD AUTO: 1.58 K/UL — SIGNIFICANT CHANGE UP (ref 1–3.3)
LYMPHOCYTES # BLD AUTO: 11.1 % — LOW (ref 13–44)
MAGNESIUM SERPL-MCNC: 2 MG/DL — SIGNIFICANT CHANGE UP (ref 1.6–2.6)
MCHC RBC-ENTMCNC: 28.2 PG — SIGNIFICANT CHANGE UP (ref 27–34)
MCHC RBC-ENTMCNC: 30.8 GM/DL — LOW (ref 32–36)
MCV RBC AUTO: 91.5 FL — SIGNIFICANT CHANGE UP (ref 80–100)
MONOCYTES # BLD AUTO: 1.84 K/UL — HIGH (ref 0–0.9)
MONOCYTES NFR BLD AUTO: 12.9 % — SIGNIFICANT CHANGE UP (ref 2–14)
NEUTROPHILS # BLD AUTO: 9.66 K/UL — HIGH (ref 1.8–7.4)
NEUTROPHILS NFR BLD AUTO: 67.9 % — SIGNIFICANT CHANGE UP (ref 43–77)
NON-GYNECOLOGICAL CYTOLOGY STUDY: SIGNIFICANT CHANGE UP
NRBC # BLD: 4 /100 WBCS — SIGNIFICANT CHANGE UP
NRBC # FLD: 0.55 K/UL — HIGH
PHOSPHATE SERPL-MCNC: 2.8 MG/DL — SIGNIFICANT CHANGE UP (ref 2.5–4.5)
PLATELET # BLD AUTO: 226 K/UL — SIGNIFICANT CHANGE UP (ref 150–400)
POTASSIUM SERPL-MCNC: 3.5 MMOL/L — SIGNIFICANT CHANGE UP (ref 3.5–5.3)
POTASSIUM SERPL-SCNC: 3.5 MMOL/L — SIGNIFICANT CHANGE UP (ref 3.5–5.3)
PROT SERPL-MCNC: 4.7 G/DL — LOW (ref 6–8.3)
RBC # BLD: 3.19 M/UL — LOW (ref 4.2–5.8)
RBC # FLD: 18.8 % — HIGH (ref 10.3–14.5)
SODIUM SERPL-SCNC: 132 MMOL/L — LOW (ref 135–145)
SPECIMEN SOURCE: SIGNIFICANT CHANGE UP
TROPONIN T, HIGH SENSITIVITY RESULT: 1805 NG/L — CRITICAL HIGH
VANCOMYCIN FLD-MCNC: 19.3 UG/ML — SIGNIFICANT CHANGE UP
WBC # BLD: 14.22 K/UL — HIGH (ref 3.8–10.5)
WBC # FLD AUTO: 14.22 K/UL — HIGH (ref 3.8–10.5)

## 2021-07-14 PROCEDURE — 99291 CRITICAL CARE FIRST HOUR: CPT

## 2021-07-14 PROCEDURE — 99233 SBSQ HOSP IP/OBS HIGH 50: CPT

## 2021-07-14 RX ORDER — NOREPINEPHRINE BITARTRATE/D5W 8 MG/250ML
0.05 PLASTIC BAG, INJECTION (ML) INTRAVENOUS
Qty: 16 | Refills: 0 | Status: DISCONTINUED | OUTPATIENT
Start: 2021-07-14 | End: 2021-07-14

## 2021-07-14 RX ORDER — NOREPINEPHRINE BITARTRATE/D5W 8 MG/250ML
0.05 PLASTIC BAG, INJECTION (ML) INTRAVENOUS
Qty: 16 | Refills: 0 | Status: DISCONTINUED | OUTPATIENT
Start: 2021-07-14 | End: 2021-07-16

## 2021-07-14 RX ORDER — INSULIN LISPRO 100/ML
VIAL (ML) SUBCUTANEOUS EVERY 6 HOURS
Refills: 0 | Status: DISCONTINUED | OUTPATIENT
Start: 2021-07-14 | End: 2021-07-28

## 2021-07-14 RX ORDER — DEXTROSE 50 % IN WATER 50 %
15 SYRINGE (ML) INTRAVENOUS ONCE
Refills: 0 | Status: DISCONTINUED | OUTPATIENT
Start: 2021-07-14 | End: 2021-07-28

## 2021-07-14 RX ORDER — DILTIAZEM HCL 120 MG
30 CAPSULE, EXT RELEASE 24 HR ORAL
Refills: 0 | Status: DISCONTINUED | OUTPATIENT
Start: 2021-07-14 | End: 2021-07-22

## 2021-07-14 RX ORDER — INSULIN LISPRO 100/ML
VIAL (ML) SUBCUTANEOUS
Refills: 0 | Status: DISCONTINUED | OUTPATIENT
Start: 2021-07-14 | End: 2021-07-14

## 2021-07-14 RX ADMIN — ATORVASTATIN CALCIUM 40 MILLIGRAM(S): 80 TABLET, FILM COATED ORAL at 23:22

## 2021-07-14 RX ADMIN — Medication 30 MILLIGRAM(S): at 23:23

## 2021-07-14 RX ADMIN — Medication 40.4 MICROGRAM(S)/KG/MIN: at 09:49

## 2021-07-14 RX ADMIN — Medication 1: at 23:47

## 2021-07-14 RX ADMIN — Medication 4.04 MICROGRAM(S)/KG/MIN: at 12:00

## 2021-07-14 RX ADMIN — Medication 30 MILLIGRAM(S): at 00:15

## 2021-07-14 RX ADMIN — PANTOPRAZOLE SODIUM 40 MILLIGRAM(S): 20 TABLET, DELAYED RELEASE ORAL at 17:18

## 2021-07-14 RX ADMIN — CHLORHEXIDINE GLUCONATE 15 MILLILITER(S): 213 SOLUTION TOPICAL at 17:18

## 2021-07-14 RX ADMIN — Medication 30 MILLIGRAM(S): at 17:18

## 2021-07-14 RX ADMIN — Medication 1: at 05:52

## 2021-07-14 RX ADMIN — Medication 1: at 12:02

## 2021-07-14 RX ADMIN — Medication 81 MILLIGRAM(S): at 12:03

## 2021-07-14 RX ADMIN — PANTOPRAZOLE SODIUM 40 MILLIGRAM(S): 20 TABLET, DELAYED RELEASE ORAL at 05:22

## 2021-07-14 RX ADMIN — KETAMINE HYDROCHLORIDE 2.16 MG/KG/HR: 100 INJECTION INTRAMUSCULAR; INTRAVENOUS at 09:49

## 2021-07-14 RX ADMIN — Medication 30 MILLIGRAM(S): at 06:00

## 2021-07-14 RX ADMIN — CHLORHEXIDINE GLUCONATE 15 MILLILITER(S): 213 SOLUTION TOPICAL at 05:22

## 2021-07-14 RX ADMIN — Medication 4.04 MICROGRAM(S)/KG/MIN: at 17:17

## 2021-07-14 RX ADMIN — PROPOFOL 5.17 MICROGRAM(S)/KG/MIN: 10 INJECTION, EMULSION INTRAVENOUS at 17:18

## 2021-07-14 RX ADMIN — PROPOFOL 5.17 MICROGRAM(S)/KG/MIN: 10 INJECTION, EMULSION INTRAVENOUS at 09:50

## 2021-07-14 RX ADMIN — CHLORHEXIDINE GLUCONATE 1 APPLICATION(S): 213 SOLUTION TOPICAL at 12:02

## 2021-07-14 RX ADMIN — Medication 1: at 17:10

## 2021-07-14 RX ADMIN — HEPARIN SODIUM 1000 UNIT(S)/HR: 5000 INJECTION INTRAVENOUS; SUBCUTANEOUS at 05:21

## 2021-07-14 NOTE — PROGRESS NOTE ADULT - SUBJECTIVE AND OBJECTIVE BOX
[facility] - S Eugene / Marlena S /D Cruz/ S Narendra/ WILY Macias/ Alexandre Esposito / WILMER Hermosillou/ O Jessi  service -[phone], office [phone]  ---------------------------------------------------------------------------------------------------------------    Patient seen and examined bedside    Subjective and Objective: No overnight events, sob resolved. No complaints today. feeling better    Allergies: lisinopril (Other)  opioid-like analgesics (Other)      Hospital Medications:   MEDICATIONS  (STANDING):  aspirin  chewable 81 milliGRAM(s) Oral daily  atorvastatin 40 milliGRAM(s) Oral at bedtime  chlorhexidine 0.12% Liquid 15 milliLiter(s) Oral Mucosa every 12 hours  chlorhexidine 2% Cloths 1 Application(s) Topical daily  dextrose 40% Gel 15 Gram(s) Oral once  dextrose 40% Gel 15 Gram(s) Oral once  dextrose 5%. 1000 milliLiter(s) (50 mL/Hr) IV Continuous <Continuous>  dextrose 5%. 1000 milliLiter(s) (100 mL/Hr) IV Continuous <Continuous>  dextrose 50% Injectable 25 Gram(s) IV Push once  dextrose 50% Injectable 12.5 Gram(s) IV Push once  dextrose 50% Injectable 25 Gram(s) IV Push once  diltiazem    Tablet 30 milliGRAM(s) Oral four times a day  epoetin danilo-epbx (RETACRIT) Injectable 14252 Unit(s) IV Push <User Schedule>  glucagon  Injectable 1 milliGRAM(s) IntraMuscular once  heparin  Infusion. 1000 Unit(s)/Hr (10 mL/Hr) IV Continuous <Continuous>  insulin lispro (ADMELOG) corrective regimen sliding scale   SubCutaneous every 6 hours  norepinephrine Infusion 0.05 MICROgram(s)/kG/Min (4.04 mL/Hr) IV Continuous <Continuous>  pantoprazole  Injectable 40 milliGRAM(s) IV Push every 12 hours  propofol Infusion 10 MICROgram(s)/kG/Min (5.17 mL/Hr) IV Continuous <Continuous>      REVIEW OF SYSTEMS:  CONSTITUTIONAL: No weakness, fevers or chills  EYES/ENT: No visual changes;  No vertigo or throat pain   NECK: No pain or stiffness  RESPIRATORY: No cough, wheezing, hemoptysis; No shortness of breath  CARDIOVASCULAR: No chest pain or palpitations.  GASTROINTESTINAL: No abdominal or epigastric pain. No nausea, vomiting, or hematemesis; No diarrhea or constipation. No melena or hematochezia.  GENITOURINARY: No dysuria, frequency, foamy urine, urinary urgency, incontinence or hematuria  NEUROLOGICAL: No numbness or weakness  SKIN: No itching, burning, rashes, or lesions   VASCULAR: No bilateral lower extremity edema.   All other review of systems is negative unless indicated above.    VITALS:  T(F): 99.4 (07-14-21 @ 16:00), Max: 99.4 (07-14-21 @ 16:00)  HR: 65 (07-14-21 @ 18:00)  BP: 136/40 (07-14-21 @ 18:00)  RR: 24 (07-14-21 @ 18:00)  SpO2: 99% (07-14-21 @ 18:00)  Wt(kg): --    07-13 @ 07:01  -  07-14 @ 07:00  --------------------------------------------------------  IN: 2025.2 mL / OUT: 400 mL / NET: 1625.2 mL    07-14 @ 07:01  -  07-14 @ 18:20  --------------------------------------------------------  IN: 667.6 mL / OUT: 0 mL / NET: 667.6 mL          PHYSICAL EXAM:  Constitutional: NAD  HEENT: anicteric sclera, oropharynx clear  Neck: No JVD  Respiratory: CTAB, no wheezes, rales or rhonchi  Cardiovascular: S1, S2, RRR  Gastrointestinal: BS+, soft, NT/ND  Extremities: No cyanosis or clubbing. No peripheral edema  Neurological: A/O x 3, no focal deficits  Psychiatric: Normal mood, normal affect  : No CVA tenderness. No wagner.   Skin: No rashes  Vascular Access:    LABS:  07-14    132<L>  |  92<L>  |  20  ----------------------------<  159<H>  3.5   |  18<L>  |  5.18<H>    Ca    8.1<L>      14 Jul 2021 03:59  Phos  2.8     07-14  Mg     2.00     07-14    TPro  4.7<L>  /  Alb  2.5<L>  /  TBili  0.5  /  DBili      /  AST  21  /  ALT  <5<L>  /  AlkPhos  82  07-14    Creatinine Trend: 5.18 <--, 6.40 <--, 6.05 <--, 8.49 <--, 8.56 <--, 7.83 <--, 5.72 <--, 6.42 <--, 6.10 <--, TNP <--, 4.65 <--                        9.0    14.22 )-----------( 226      ( 14 Jul 2021 03:59 )             29.2     Urine Studies:        RADIOLOGY & ADDITIONAL STUDIES:   [facility] - S Eugene / Marlena S /D Cruz/ WILY Mackey/ WILY Macias/ Alexandre Esposito / WILMER Hermosillou/ O Jessi  service -[phone], office [phone]  ---------------------------------------------------------------------------------------------------------------    Patient seen and examined bedside    Subjective and Objective: overnight events noted. s/p cardiac arrest, intubated on MV, on levophed  family bedside    Allergies: lisinopril (Other)  opioid-like analgesics (Other)      Hospital Medications:   MEDICATIONS  (STANDING):  aspirin  chewable 81 milliGRAM(s) Oral daily  atorvastatin 40 milliGRAM(s) Oral at bedtime  chlorhexidine 0.12% Liquid 15 milliLiter(s) Oral Mucosa every 12 hours  chlorhexidine 2% Cloths 1 Application(s) Topical daily  dextrose 40% Gel 15 Gram(s) Oral once  dextrose 40% Gel 15 Gram(s) Oral once  dextrose 5%. 1000 milliLiter(s) (50 mL/Hr) IV Continuous <Continuous>  dextrose 5%. 1000 milliLiter(s) (100 mL/Hr) IV Continuous <Continuous>  dextrose 50% Injectable 25 Gram(s) IV Push once  dextrose 50% Injectable 12.5 Gram(s) IV Push once  dextrose 50% Injectable 25 Gram(s) IV Push once  diltiazem    Tablet 30 milliGRAM(s) Oral four times a day  epoetin danilo-epbx (RETACRIT) Injectable 75808 Unit(s) IV Push <User Schedule>  glucagon  Injectable 1 milliGRAM(s) IntraMuscular once  heparin  Infusion. 1000 Unit(s)/Hr (10 mL/Hr) IV Continuous <Continuous>  insulin lispro (ADMELOG) corrective regimen sliding scale   SubCutaneous every 6 hours  norepinephrine Infusion 0.05 MICROgram(s)/kG/Min (4.04 mL/Hr) IV Continuous <Continuous>  pantoprazole  Injectable 40 milliGRAM(s) IV Push every 12 hours  propofol Infusion 10 MICROgram(s)/kG/Min (5.17 mL/Hr) IV Continuous <Continuous>      VITALS:  T(F): 99.4 (07-14-21 @ 16:00), Max: 99.4 (07-14-21 @ 16:00)  HR: 65 (07-14-21 @ 18:00)  BP: 136/40 (07-14-21 @ 18:00)  RR: 24 (07-14-21 @ 18:00)  SpO2: 99% (07-14-21 @ 18:00)  Wt(kg): --    07-13 @ 07:01  -  07-14 @ 07:00  --------------------------------------------------------  IN: 2025.2 mL / OUT: 400 mL / NET: 1625.2 mL    07-14 @ 07:01  -  07-14 @ 18:20  --------------------------------------------------------  IN: 667.6 mL / OUT: 0 mL / NET: 667.6 mL      PHYSICAL EXAM:  HEENT: ETT+  Respiratory: CTABi  Cardiovascular: S1, S2  Extremities: + peripheral edema  Neurological: sedated  : No bernard.   Vascular Access:  LUE AV access +thrill and bruit.     LABS:  07-14    132<L>  |  92<L>  |  20  ----------------------------<  159<H>  3.5   |  18<L>  |  5.18<H>    Ca    8.1<L>      14 Jul 2021 03:59  Phos  2.8     07-14  Mg     2.00     07-14    TPro  4.7<L>  /  Alb  2.5<L>  /  TBili  0.5  /  DBili      /  AST  21  /  ALT  <5<L>  /  AlkPhos  82  07-14    Creatinine Trend: 5.18 <--, 6.40 <--, 6.05 <--, 8.49 <--, 8.56 <--, 7.83 <--, 5.72 <--, 6.42 <--, 6.10 <--, TNP <--, 4.65 <--                        9.0    14.22 )-----------( 654 ( 14 Jul 2021 03:59 )             29.2     Urine Studies:        RADIOLOGY & ADDITIONAL STUDIES:

## 2021-07-14 NOTE — PROGRESS NOTE ADULT - SUBJECTIVE AND OBJECTIVE BOX
INTERVAL HPI/OVERNIGHT EVENTS:    intubated/comfortable in micu   continues to be free of rectal bleeding   stable hgb    MEDICATIONS  (STANDING):  aspirin  chewable 81 milliGRAM(s) Oral daily  atorvastatin 40 milliGRAM(s) Oral at bedtime  chlorhexidine 0.12% Liquid 15 milliLiter(s) Oral Mucosa every 12 hours  chlorhexidine 2% Cloths 1 Application(s) Topical daily  dextrose 40% Gel 15 Gram(s) Oral once  dextrose 40% Gel 15 Gram(s) Oral once  dextrose 5%. 1000 milliLiter(s) (50 mL/Hr) IV Continuous <Continuous>  dextrose 5%. 1000 milliLiter(s) (100 mL/Hr) IV Continuous <Continuous>  dextrose 50% Injectable 25 Gram(s) IV Push once  dextrose 50% Injectable 12.5 Gram(s) IV Push once  dextrose 50% Injectable 25 Gram(s) IV Push once  diltiazem    Tablet 30 milliGRAM(s) Oral four times a day  epoetin danilo-epbx (RETACRIT) Injectable 46856 Unit(s) IV Push <User Schedule>  glucagon  Injectable 1 milliGRAM(s) IntraMuscular once  heparin  Infusion. 1000 Unit(s)/Hr (10 mL/Hr) IV Continuous <Continuous>  insulin lispro (ADMELOG) corrective regimen sliding scale   SubCutaneous every 6 hours  ketamine Infusion. 0.25 mG/kG/Hr (2.16 mL/Hr) IV Continuous <Continuous>  norepinephrine Infusion 0.25 MICROgram(s)/kG/Min (40.4 mL/Hr) IV Continuous <Continuous>  pantoprazole  Injectable 40 milliGRAM(s) IV Push every 12 hours  propofol Infusion 10 MICROgram(s)/kG/Min (5.17 mL/Hr) IV Continuous <Continuous>    MEDICATIONS  (PRN):  acetaminophen   Tablet .. 1000 milliGRAM(s) Oral every 6 hours PRN Temp greater or equal to 38C (100.4F), Mild Pain (1 - 3), Moderate Pain (4 - 6)  calamine/zinc oxide Lotion 1 Application(s) Topical three times a day PRN Itching      Allergies    lisinopril (Other)  opioid-like analgesics (Other)    Intolerances        Review of Systems: *intubated/unobtainable        Vital Signs Last 24 Hrs  T(C): 37.1 (14 Jul 2021 08:00), Max: 37.1 (14 Jul 2021 00:00)  T(F): 98.7 (14 Jul 2021 08:00), Max: 98.7 (14 Jul 2021 00:00)  HR: 65 (14 Jul 2021 10:00) (60 - 73)  BP: 145/47 (14 Jul 2021 10:00) (85/71 - 187/24)  BP(mean): 69 (14 Jul 2021 10:00) (49 - 89)  RR: 24 (14 Jul 2021 10:00) (22 - 24)  SpO2: 99% (14 Jul 2021 10:00) (92% - 100%)    PHYSICAL EXAM:    Constitutional: NAD  HEENT: EOMI, throat clear  Neck: No LAD, supple  Respiratory: CTA and P  Cardiovascular: S1 and S2, RRR, no M  Gastrointestinal: BS+, soft, NT/ND, neg HSM,  Extremities: No peripheral edema, neg clubbing, cyanosis  Vascular: 2+ peripheral pulses  Neurological: A/O x 3, no focal deficits  Psychiatric: Normal mood, normal affect  Skin: No rashes      LABS:                        9.0    14.22 )-----------( 226      ( 14 Jul 2021 03:59 )             29.2     07-14    132<L>  |  92<L>  |  20  ----------------------------<  159<H>  3.5   |  18<L>  |  5.18<H>    Ca    8.1<L>      14 Jul 2021 03:59  Phos  2.8     07-14  Mg     2.00     07-14    TPro  4.7<L>  /  Alb  2.5<L>  /  TBili  0.5  /  DBili  x   /  AST  21  /  ALT  <5<L>  /  AlkPhos  82  07-14    PT/INR - ( 13 Jul 2021 02:50 )   PT: 16.2 sec;   INR: 1.45 ratio         PTT - ( 14 Jul 2021 03:59 )  PTT:79.5 sec      RADIOLOGY & ADDITIONAL TESTS:   INTERVAL HPI/OVERNIGHT EVENTS:    intubated/comfortable in micu   continues to be free of rectal bleeding   stable hgb    MEDICATIONS  (STANDING):  aspirin  chewable 81 milliGRAM(s) Oral daily  atorvastatin 40 milliGRAM(s) Oral at bedtime  chlorhexidine 0.12% Liquid 15 milliLiter(s) Oral Mucosa every 12 hours  chlorhexidine 2% Cloths 1 Application(s) Topical daily  dextrose 40% Gel 15 Gram(s) Oral once  dextrose 40% Gel 15 Gram(s) Oral once  dextrose 5%. 1000 milliLiter(s) (50 mL/Hr) IV Continuous <Continuous>  dextrose 5%. 1000 milliLiter(s) (100 mL/Hr) IV Continuous <Continuous>  dextrose 50% Injectable 25 Gram(s) IV Push once  dextrose 50% Injectable 12.5 Gram(s) IV Push once  dextrose 50% Injectable 25 Gram(s) IV Push once  diltiazem    Tablet 30 milliGRAM(s) Oral four times a day  epoetin danilo-epbx (RETACRIT) Injectable 86071 Unit(s) IV Push <User Schedule>  glucagon  Injectable 1 milliGRAM(s) IntraMuscular once  heparin  Infusion. 1000 Unit(s)/Hr (10 mL/Hr) IV Continuous <Continuous>  insulin lispro (ADMELOG) corrective regimen sliding scale   SubCutaneous every 6 hours  ketamine Infusion. 0.25 mG/kG/Hr (2.16 mL/Hr) IV Continuous <Continuous>  norepinephrine Infusion 0.25 MICROgram(s)/kG/Min (40.4 mL/Hr) IV Continuous <Continuous>  pantoprazole  Injectable 40 milliGRAM(s) IV Push every 12 hours  propofol Infusion 10 MICROgram(s)/kG/Min (5.17 mL/Hr) IV Continuous <Continuous>    MEDICATIONS  (PRN):  acetaminophen   Tablet .. 1000 milliGRAM(s) Oral every 6 hours PRN Temp greater or equal to 38C (100.4F), Mild Pain (1 - 3), Moderate Pain (4 - 6)  calamine/zinc oxide Lotion 1 Application(s) Topical three times a day PRN Itching      Allergies    lisinopril (Other)  opioid-like analgesics (Other)    Intolerances        Review of Systems: *intubated/unobtainable        Vital Signs Last 24 Hrs  T(C): 37.1 (14 Jul 2021 08:00), Max: 37.1 (14 Jul 2021 00:00)  T(F): 98.7 (14 Jul 2021 08:00), Max: 98.7 (14 Jul 2021 00:00)  HR: 65 (14 Jul 2021 10:00) (60 - 73)  BP: 145/47 (14 Jul 2021 10:00) (85/71 - 187/24)  BP(mean): 69 (14 Jul 2021 10:00) (49 - 89)  RR: 24 (14 Jul 2021 10:00) (22 - 24)  SpO2: 99% (14 Jul 2021 10:00) (92% - 100%)    PHYSICAL EXAM:    Constitutional: NAD  HEENT: EOMI, throat clear  Neck: No LAD, supple  Respiratory: intubated  Cardiovascular: S1 and S2, RRR, no M  Gastrointestinal: BS+, soft, NT/ND, neg HSM,  Extremities: No peripheral edema, neg clubbing, cyanosis  Vascular: 2+ peripheral pulses  Neurological: A/O x0  Psychiatric: sedated/intubated  Skin: No rashes      LABS:                        9.0    14.22 )-----------( 226      ( 14 Jul 2021 03:59 )             29.2     07-14    132<L>  |  92<L>  |  20  ----------------------------<  159<H>  3.5   |  18<L>  |  5.18<H>    Ca    8.1<L>      14 Jul 2021 03:59  Phos  2.8     07-14  Mg     2.00     07-14    TPro  4.7<L>  /  Alb  2.5<L>  /  TBili  0.5  /  DBili  x   /  AST  21  /  ALT  <5<L>  /  AlkPhos  82  07-14    PT/INR - ( 13 Jul 2021 02:50 )   PT: 16.2 sec;   INR: 1.45 ratio         PTT - ( 14 Jul 2021 03:59 )  PTT:79.5 sec      RADIOLOGY & ADDITIONAL TESTS:    < from: CT Abdomen and Pelvis w/ IV Cont (07.13.21 @ 18:37) >    EXAM:  CT CHEST IC      EXAM:  CT ABDOMEN AND PELVIS IC        PROCEDURE DATE:  Jul 13 2021         INTERPRETATION:  CLINICAL INFORMATION: Sepsis and cardiac arrest.    COMPARISON: CT chest 7/6/2021 and 6/27/2020.    CONTRAST/COMPLICATIONS:  IV Contrast: Omnipaque 350  90 cc administered   10 cc discarded  Oral Contrast: NONE  Complications: None reported at time of study completion    PROCEDURE:  CT of the Chest, Abdomen and Pelvis was performed.  Sagittal and coronal reformats were performed.    FINDINGS:  CHEST:  LUNGS, LARGE AIRWAYS, AND PLEURA: Minimal secretions within the distal trachea and right main stem bronchus. Endotracheal tube is in place. Small bilateral pleural effusions,,partially loculated, with adjacent passive atelectasis. Vascular congestion and groundglass opacities.  VESSELS: Left IJ approach central venous catheter with the tip in the left brachiocephalic vein. Air is seen within the left brachiocephalic, likely secondary to injections. Left axillary vein stent is patent. Calcifications of the aortic arch and coronary vessels. CABG.  HEART: Cardiomegaly. Left ventricular free wall fatty replacement, likely related to prior myocardial infarction. No pericardial effusion. Retained epicardial pacemaker leads.  MEDIASTINUM AND YOANA: No lymphadenopathy.  CHEST WALL AND LOWER NECK: Enlarged thyroid gland with multiple hypodense nodules bilaterally, the right containing calcifications, appear unchanged. Median sternotomy.  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  BILE DUCTS: Normal caliber.  GALLBLADDER: Within normal limits.  SPLEEN: Within normal limits.  PANCREAS: Within normal limits.  ADRENALS: Within normal limits.  KIDNEYS/URETERS: Atrophic bilateral kidneys. No hydronephrosis. Exophytic 1.3 cm cyst arising from the left kidney.    BLADDER: Decompressed.  REPRODUCTIVE ORGANS: Prostate within normal limits. Bilateral hydroceles.    BOWEL: No bowel obstruction. Appendix is normal. Enteric tube is present with the tip in stomach.  PERITONEUM: Mild to moderate ascites in the perihepatic and perisplenic regions, tracking along the paracolic gutters. Trace ascites in the pelvis.  VESSELS: Atherosclerotic changes.  RETROPERITONEUM/LYMPH NODES: No lymphadenopathy.  ABDOMINAL WALL: Diffuse subcutaneous edema.  BONES: Degenerative changes.    IMPRESSION:  *  Mild pulmonary edema with bilateral partially loculated pleural effusions with adjacent passive atelectasis.  *  Debris within the right mainstem with associated partial atelectasis of the right lower lobe.  *  Mild to moderate ascites.    --- End of Report ---            TRU WEBER MD; Resident Radiology  This document has been electronically signed.  MIKE NOLAN MD; Attending Radiologist  This document has been electronically signed. Jul 14 2021  9:42AM    < end of copied text >

## 2021-07-14 NOTE — CHART NOTE - NSCHARTNOTEFT_GEN_A_CORE
: Valencia Sanchez     INDICATION: hypotension    PROCEDURE:  [ ] LIMITED ECHO  [x ] LIMITED CHEST  [ ] LIMITED RETROPERITONEAL  [ ] LIMITED ABDOMINAL  [ ] LIMITED DVT  [ ] NEEDLE GUIDANCE VASCULAR  [ ] NEEDLE GUIDANCE THORACENTESIS  [ ] NEEDLE GUIDANCE PARACENTESIS  [ ] NEEDLE GUIDANCE PERICARDIOCENTESIS  [ ] OTHER    FINDINGS:  A- line predominant anterior lung fields b/l  Small left sided pleural effusion and possible LLB PNA     INTERPRETATION: Small left sided pleural effusion and possible LLB PNA     Images stored on [facility].    Valencia Sanchez, PGY-2

## 2021-07-14 NOTE — PROGRESS NOTE ADULT - SUBJECTIVE AND OBJECTIVE BOX
CARDIOLOGY ATTENDING    tele - SR    S: remains intubated; ros limited     Review of Systems:   Constitutional: [ ] fevers, [ ] chills.   Skin: [ ] dry skin. [ ] rashes.  Psychiatric: [ ] depression, [ ] anxiety.   Gastrointestinal: [ ] BRBPR, [ ] melena.   Neurological: [ ] confusion. [ ] seizures. [ ] shuffling gait.   Ears,Nose,Mouth and Throat: [ ] ear pain [ ] sore throat.   Eyes: [ ] diplopia.   Respiratory: [ ] hemoptysis. [ ] shortness of breath  Cardiovascular: See HPI above  Hematologic/Lymphatic: [ ] anemia. [ ] painful nodes. [ ] prolonged bleeding.   Genitourinary: [ ] hematuria. [ ] flank pain.   Endocrine: [ ] significant change in weight. [ ] intolerance to heat and cold.     Review of systems [ ] otherwise negative, [x ] otherwise unable to obtain    FH: no family history of sudden cardiac death in first degree relatives    SH: [ ] tobacco, [ ] alcohol, [ ] drugs    acetaminophen   Tablet .. 1000 milliGRAM(s) Oral every 6 hours PRN  aspirin  chewable 81 milliGRAM(s) Oral daily  atorvastatin 40 milliGRAM(s) Oral at bedtime  calamine/zinc oxide Lotion 1 Application(s) Topical three times a day PRN  chlorhexidine 0.12% Liquid 15 milliLiter(s) Oral Mucosa every 12 hours  chlorhexidine 2% Cloths 1 Application(s) Topical daily  dextrose 40% Gel 15 Gram(s) Oral once  dextrose 40% Gel 15 Gram(s) Oral once  dextrose 5%. 1000 milliLiter(s) IV Continuous <Continuous>  dextrose 5%. 1000 milliLiter(s) IV Continuous <Continuous>  dextrose 50% Injectable 25 Gram(s) IV Push once  dextrose 50% Injectable 12.5 Gram(s) IV Push once  dextrose 50% Injectable 25 Gram(s) IV Push once  diltiazem    Tablet 30 milliGRAM(s) Oral four times a day  epoetin danilo-epbx (RETACRIT) Injectable 22369 Unit(s) IV Push <User Schedule>  glucagon  Injectable 1 milliGRAM(s) IntraMuscular once  heparin  Infusion. 1000 Unit(s)/Hr IV Continuous <Continuous>  insulin lispro (ADMELOG) corrective regimen sliding scale   SubCutaneous every 6 hours  norepinephrine Infusion 0.05 MICROgram(s)/kG/Min IV Continuous <Continuous>  pantoprazole  Injectable 40 milliGRAM(s) IV Push every 12 hours  propofol Infusion 10 MICROgram(s)/kG/Min IV Continuous <Continuous>                            9.0    14.22 )-----------( 226      ( 14 Jul 2021 03:59 )             29.2       07-14    132<L>  |  92<L>  |  20  ----------------------------<  159<H>  3.5   |  18<L>  |  5.18<H>    Ca    8.1<L>      14 Jul 2021 03:59  Phos  2.8     07-14  Mg     2.00     07-14    TPro  4.7<L>  /  Alb  2.5<L>  /  TBili  0.5  /  DBili  x   /  AST  21  /  ALT  <5<L>  /  AlkPhos  82  07-14      CARDIAC MARKERS ( 14 Jul 2021 03:59 )  x     / x     / 29 U/L / x     / x      CARDIAC MARKERS ( 13 Jul 2021 02:50 )  x     / x     / 50 U/L / x     / 7.1 ng/mL  CARDIAC MARKERS ( 12 Jul 2021 01:49 )  x     / x     / 56 U/L / x     / x            T(C): 36.9 (07-14-21 @ 12:00), Max: 37.1 (07-14-21 @ 00:00)  HR: 64 (07-14-21 @ 13:00) (59 - 73)  BP: 157/47 (07-14-21 @ 13:00) (85/71 - 187/24)  RR: 24 (07-14-21 @ 13:00) (23 - 24)  SpO2: 100% (07-14-21 @ 13:00) (95% - 100%)  Wt(kg): --    I&O's Summary    13 Jul 2021 07:01  -  14 Jul 2021 07:00  --------------------------------------------------------  IN: 2025.2 mL / OUT: 400 mL / NET: 1625.2 mL    14 Jul 2021 07:01  -  14 Jul 2021 15:06  --------------------------------------------------------  IN: 503.2 mL / OUT: 0 mL / NET: 503.2 mL        General: intubated; sedated   Head: Normocephalic and atraumatic.   Neck: No JVD. No bruits. Supple. Does not appear to be enlarged.   Cardiovascular: + S1,S2 ; RRR Soft systolic murmur at the left lower sternal border. No rubs noted.    Lungs: decreased BS BL  Abdomen: + BS, soft. Non tender. Non distended. No rebound. No guarding.   Extremities: No clubbing/cyanosis/edema.   Neurologic: unable to assess  Skin: Warm and moist. The patient's skin has normal elasticity and good skin turgor.   Psychiatric: unable to assess   Musculoskeletal: unable to assess    DATA  < from: Transthoracic Echocardiogram (07.07.21 @ 18:17) >  ------------------------------------------------------------------------  CONCLUSIONS:  1. Mitral annular calcification, otherwise normal mitral  valve. Minimal mitral regurgitation.  2. Endocardium not well visualized; grossly normal left  ventricular systolic function.  3. Right ventricular enlargement with decreased right  ventricular systolic function.  4. Inadequate tricuspid regurgitation Doppler envelope  precludes estimation of RVSP.  ------------------------------------------------------------------------  Confirmed on  7/7/2021 - 21:16:20 by Osvaldo Bertrand M.D.,  [facility]    < end of copied text >        A/P) 71 y/o male PMH CAD s/p CABG and old PCI, AFL x 1 year on coumadin, HTN, hyperlipidemia, DM, ESRD on HD, and hypothyroidism originally admitted to [facility] with GIB, transferred to [facility], found to have septic shock and MRSA bacteremia.     -Events noted; pt. s/p PEA arrest and underwent shocks during code for VT  -Repeat TTE with only mild segmental LV dysfunction in the inferoseptum  -Given negative CPK, do not suspect acs  -Suspect elevated troponin in the setting of demand ischemia from underlying septic shock  -Pressor support per MICU  -Follow up EP  -Given MRSA bacteremia, check TRUMAN to rule out endocarditis - appreciate GI eval - cleared by GI for TRUMAN   -Abx per ID  -CTH head noted - pt. with cva - ac was initially held due to GIB and acute blood loss anemia at [facility] resulting in a 6 point drop in Hb - appreciate GI eval - cleared by GI to resume AC  -Neuro and MRI follow up   -Supportive care per MICU     Kalie Lau MD

## 2021-07-14 NOTE — PROGRESS NOTE ADULT - ASSESSMENT
71 yo M with PMhx of CAD s/p CABG (course at that time complicated by pleural effusion requiring chest tubes), ESRD on HD (MWF), DMII, HTN, afib on coumadin, and anemia presented from [facility] for evaluation of chest pain and GIB. Renal following for ESRD Mx.    ESRD  Maintenance schedule MWF  Pt dialyzed 7/12   In light of worsening acidosis, 2/2 to cardiac arrest, dialyzed again 7/13.   no indication for HD today  plan for enxt hd tomorrow bedside w/2k bath, no net uf  Plan for zero UF, will likely need continued pressor support during HD.   dose all meds for ESRD    Anemia in CKD+GIB- Hb <goal  c/w Epo 20k tiw with HD  monitor h/h, neg FOBT  GI following, No plans for cscope.     d/w family bedside, ICU team  labs, chart reviewed  [facility]  Office [phone]  Ans Serv [phone]  [facility] -[phone]

## 2021-07-14 NOTE — PROGRESS NOTE ADULT - ASSESSMENT
69 yo M with PMhx of CAD s/p CABG (course at that time complicated by pleural effusion requiring chest tubes), ESRD on HD (MWF), DMII, HTN, afib on coumadin, and anemia presented from [facility] for evaluation of chest pain and GIB.       left sided pleural effusion: loculated and chronic:   CAD:  CABG:  ESRD:   DM:  HTN:  A fibrillation:   GI Bleed    7/7:    left sided pleural effusion: loculated and chronic: he has chr loculated pleural effusion on left side: He had chest tube placement in last June 2020 at another hospital: no chemistry is available but he had negative cytology at that time: This time the effusion seems slightly worse and has loculated effusion with pleural thickening: heis not SOB andhasbeen on roomair: I think , it at all, if this effusion needs to be dealt with : it is probably vats: will contact thoracic surgery : etiology not very clear as there is no previous chemistry: coult be exudative or transudatve: he is on HD and now it is a chr christiano effusion   CAD: cont per cards  CABG: cont current meds  ESRD: on HD   DM: monitor and control  HTN: controlled  A fibrillation: off ac:   GI Bleed: per gi :  DW pt and t eam1    7/8:    left sided pleural effusion:: fever: s/p left sided chest tube placement: cultures sent: however with pr criteria it seems transudative await serum LDH but pleural fluid LDH is low:  already started on broad spectrum antibiotics ID to see: await cultures:   CAD: cont per cards  CABG: cont current meds  ESRD: on HD   DM: monitor and control  HTN: controlled  A fibrillation: off ac:   GI Bleed: per gi :  DW pmd    7/9:    left sided pleural effusion:: fever: s/p left sided chest tube placement: cultures sent: however with pr criteria it seems borderline transudative and transudative by LDH criteria: the vulutres ahve been negative so far:  already started on broad spectrum antibiotics ID to see: await cultures: ? source of spesis  CAD: cont per cards  CABG: cont current meds  ESRD: on HD   DM: monitor and control  HTN: in shock: now on vasopressors:   A fibrillation: off ac:   GI Bleed: per gi :  DW pmd  micu team    7/11:      left sided pleural effusion:: fever: s/p left sided chest tube placement: cultures sent: however with pr criteria it seems borderline transudative and transudative by LDH criteria: the cultures have been negative so far:  already started on broad spectrum antibiotics : Has MSSA bactermia  CAD: cont per cards: still on vasopressors: wean as tolerated ? needs molly ro tule out IE : would defer to cards:   CABG: cont current meds  ESRD: on HD   DM: monitor and control  HTN: in shock: now on vasopressors:   A fibrillation: off ac:   GI Bleed: per gi :  DW pmd  micu team    7/12:    left sided pleural effusion:: fever: s/p left sided chest tube placement:- now removed: cultures sent: however with pr criteria it seems borderline transudative and transudative by LDH criteria: the cultures have been negative so far:  already started on broad spectrum antibiotics : Has MRSA bactermia: vanco restarted yesterday ? echo/molly  Ac hypercarbic resp fialure: needs to be watched closelyin MICU : needs bipap may need intubation" At this time he is alert and awake: and responds to simple questions:    CAD: cont per cards: still on vasopressors: wean as tolerated ? needs molly ro tule out IE : would defer to cards:   CABG: cont current meds  ESRD: on HD   DM: monitor and control  HTN: in shock: now on vasopressors:   A fibrillation: off ac:   GI Bleed: per gi :  DW pmd  cards and bedstaff    7/13:  left sided pleural effusion:: fever: s/p left sided chest tube placement:- now removed: cultures sent: however with pr criteria it seems borderline transudative and transudative by LDH criteria: the cultures have been negative so far:  already started on broad spectrum antibiotics : Has MRSA bacteremia vanco restarted yesterday ? echo/molly  Ac hypercarbic resp fialure: now intubated s/p acls protocol last night   CAD: cont per cards: still on vasopressors: wean as tolerated ? needs molly ro tule out IE : would defer to cards:   CABG: cont current meds  ESRD: on HD   DM: monitor and control  HTN: in shock: now on vasopressors:   A fibrillation: off ac:   GI Bleed: per gi :  LINDA  staff  pt is critically ill now:     7/14:  left sided pleural effusion:: fever: s/p left sided chest tube placement:- now removed: cultures sent: however with pr criteria it seems borderline transudative and transudative by LDH criteria: the cultures have been negative so far:  already started on broad spectrum antibiotics : Has MRSA bacteremia vanco restarted yesterday now for  echo/molly  Ac hypercarbic resp fialure: now intubated s/p acls protocol last night -s/p cardiac arrest: ROSC 10 mts:   CAD: cont per cards: still on vasopressors: wean as tolerated ? needs molly ro tule out IE : would defer to cards: Cotn antbitioc  CABG: cont current meds  ESRD: on HD   DM: monitor and control  HTN: in shock: now on vasopressors:   A fibrillation: off ac:   GI Bleed: per gi :  LINDA  staff  pt is critically ill now: linda ortega

## 2021-07-14 NOTE — PROGRESS NOTE ADULT - ASSESSMENT
69 yo M with PMhx of CAD s/p CABG (course at that time complicated by pleural effusion requiring chest tubes), ESRD on HD (MWF), DMII, HTN, afib on coumadin, and anemia transferred from [facility] for evaluation of chest pain after HD and GIB s/p 2U PRBC transfusion and aflutter/afib with RVR managed with metoprolol. Admitted to MICU for hypotension dependent on pressors likely 2/2 to sepsis.    #Neuro  - patient sedated on propofol and ketamine  - CT head shows foci of low density in the R anterolateral frontal lobe and L posterior temporal lobe concerning for acute infarcts.  - Mechanism likely cardioembolic in setting of patient with known atrial fibrillation with cardiac arrest.  - per neurology, can consider MRI brain w/o contrast. However, unlikely to  since patient has known atrial fibrillation and will need to be continued of full dose anticoagulation.  - Will obtain carotid dopplers per neuro  - Avoid hypotension, keep -180 for neuroprotection  - c/w Atorvastatin 40MG QHS for secondary stroke prevention.    #Cardiovascular  - Cardiac arrest: Patient had asystole night of 7/13 and CPR was started. S/p epi x3 and shock x2 with return to NSR. Cards is following  - TTE on 7/13 showed Mild mitral regurgitation with biventricular dysfunction of LV and RV systolic function. Endocardium was not well visualized, Hypokinesis of the basal inferior wall and basal inferoseptum.  - TRUMAN scheduled for today to better evaluate the structural and valvular abnormalities. However, the son does not want any procedures to be done until the MRI is done and neurology speaks to the patient. Per neuro, MRI of the brain will not change care at this time.  - Hypotension: MICU was consulted one day prior to transfer for hypotension responsive to Midodrine and 500cc IV bolus. Day of transfer, he presented with bradycardia and worsening hypotension after placement of chest tube. He was given atropine, Chris and Levophed.  - c/w Levophed wean as tolerated.   - Will consider switching to midodrine once HR stable  - Troponins 1805, CKMB 7.1. Troponin stable from prior to cardiac arrest. They are likely 2/2 chronic condition/ESRD on HD vs Hypotensive event. Will continue to trend and monitor.    A. Fib  - on coumadin at home,  - c/ heparin gtt prophylaxis  - from neurology stand point, heparin is sufficient for now. Will need to be switched to DOAC in the future if kidney function allows  - 7/11: tried to transition heparin gtt to eliquis but pt is not a candidate due to his acute MIGUEL; back on heparin gtt   - c/ diltiazem for rate control    #Respiratory  - Loculated Pleural effusions: chest tube removed   - pleural fluid cx: (-); transudative.   - currently saturating adequately on NC @ 6L. Wean as tolerated.  - CXR s/p chest tube removal showed no evidence of atelectasis. Demonstrated loculated left pleural effusion decreased in size and new hazy right lower lung opacity which could be due to a small layering right pleural effusion with associated passive atelectasis, atelectasis of other cause, and/or pneumonia.  - f/u CT chest/abd/pelvis  - abx as below      #GI/Nutrition:  - Diet: Renal diet    - Was C/o abdominal pain, bedside POCUS didn't show ascites. No abdominal pain at this time. Continue to monitor.   - negative occult bleeding, hgb stable  - Per GI, patient is stable to get TRUMAN and does not need endoscopic evaluation prior to TRUMAN.   -Zofran PRN for Nausea    #/Renal  - ESRD: HD on MWF. Most recent Scr 6.40. Potassium is stable.   - Patient received dialysis after cardiac arrest for acidemia on 7/13.  - Next session planned for today  - c/w Epo 20k tiw with HD      #Skin  - HD Access: fistula in left upper arm  - Improving erythematous and tender on right wrist. Continue to monitor. RUE US neg for thrombus      #ID  - S aureus PCR: (+),  MRSA PCR negative  - BCx on 7/7 now growing MRSA: restarted on vanc (7/11- )  - Get vanc level before HD and vanc by level   - Vanc level on 7/13 was 24.8. Will obtain new levels today  - Surveillance blood Cx q48h until neg.  - Will remove MRSA precaution as not necessary per ID for MRSA in blood    #Endocrine  - DMII: c/w Lispro SS. Monitor glucose.      #Hematologic/DVT ppx  - Heparin gtt      #Ethics  - full code as per wife- full code as per wife       69 yo M with PMhx of CAD s/p CABG (course at that time complicated by pleural effusion requiring chest tubes), ESRD on HD (MWF), DMII, HTN, afib on coumadin, and anemia transferred from [facility] for evaluation of chest pain after HD and GIB s/p 2U PRBC transfusion and aflutter/afib with RVR managed with metoprolol. Admitted to MICU for hypotension dependent on pressors likely 2/2 to sepsis.    #Neuro  - patient sedated on propofol and ketamine. Wean off sedation to better evaluate mental status.  - CT head shows foci of low density in the R anterolateral frontal lobe and L posterior temporal lobe concerning for acute infarcts.  - Mechanism likely cardioembolic in setting of patient with known atrial fibrillation with cardiac arrest.  - per neurology, can consider MRI brain w/o contrast. However, unlikely to  since patient has known atrial fibrillation and will need to be continued of full dose anticoagulation.  - Will obtain carotid dopplers per neuro  - Avoid hypotension, keep -180 for neuroprotection  - c/w Atorvastatin 40MG QHS for secondary stroke prevention.    #Cardiovascular  - Cardiac arrest: Patient had asystole night of 7/13 and CPR was started. S/p epi x3 and shock x2 with return to NSR. Cards is following  - TTE on 7/13 showed Mild mitral regurgitation with biventricular dysfunction of LV and RV systolic function. Endocardium was not well visualized, Hypokinesis of the basal inferior wall and basal inferoseptum.  - TRUMAN was scheduled for today to better evaluate the structural and valvular abnormalities. However, the son does not want any procedures to be done until the MRI is done and neurology speaks to the patient. Per neuro, MRI of the brain will not change care at this time. No TRUMAN for today  - Hypotension: MICU was consulted one day prior to transfer for hypotension responsive to Midodrine and 500cc IV bolus. Day of transfer, he presented with bradycardia and worsening hypotension after placement of chest tube. He was given atropine, Chris and Levophed.  - c/w Levophed wean as tolerated.   - Will consider switching to midodrine once HR stable  - Troponins 1805, CKMB 7.1. Troponin stable from prior to cardiac arrest. They are likely 2/2 chronic condition/ESRD on HD vs Hypotensive event. Will continue to trend and monitor.    A. Fib  - on coumadin at home,  - c/ heparin gtt prophylaxis  - from neurology stand point, heparin is sufficient for now. Will need to be switched to DOAC in the future if kidney function allows  - 7/11: tried to transition heparin gtt to eliquis but pt is not a candidate due to his acute MIGUEL; back on heparin gtt   - c/ diltiazem for rate control    #Respiratory  - Loculated Pleural effusions: chest tube removed   - pleural fluid cx: (-); transudative.   - currently saturating adequately on NC @ 6L. Wean as tolerated.  - CXR s/p chest tube removal showed no evidence of atelectasis. Demonstrated loculated left pleural effusion decreased in size and new hazy right lower lung opacity which could be due to a small layering right pleural effusion with associated passive atelectasis, atelectasis of other cause, and/or pneumonia.  - f/u CT chest/abd/pelvis  - abx as below      #GI/Nutrition:  - Diet: Renal diet    - Was C/o abdominal pain, bedside POCUS didn't show ascites. No abdominal pain at this time. Continue to monitor.   - negative occult bleeding, hgb stable  - Per GI, patient is stable to get TRUMAN and does not need endoscopic evaluation prior to TRUMAN.   -Zofran PRN for Nausea    #/Renal  - ESRD: HD on MWF. Most recent Scr 6.40. Potassium is stable.   - Patient received dialysis after cardiac arrest for acidemia on 7/13.  - Spoke with nephro regarding dialysis session today in setting of contrast load yesterday. Awaiting recs.   - c/w Epo 20k tiw with HD      #Skin  - HD Access: fistula in left upper arm  - Improving erythematous and tender on right wrist. Continue to monitor. RUE US neg for thrombus      #ID  - S aureus PCR: (+),  MRSA PCR negative  - BCx on 7/7 now growing MRSA: restarted on vanc (7/11- )  - Vanc level on 7/13 was 24.8. f/u level today  - Will give vanco dose depending on HD today or not  - Surveillance blood Cx have been negative for 48hours, no longer need to continue    #Endocrine  - DMII: c/w Lispro SS. Monitor glucose.      #Hematologic/DVT ppx  - Heparin gtt      #Ethics  - full code as per wife- full code as per wife       71 yo M with PMhx of CAD s/p CABG (course at that time complicated by pleural effusion requiring chest tubes), ESRD on HD (MWF), DMII, HTN, afib on coumadin, and anemia transferred from [facility] for evaluation of chest pain after HD and GIB s/p 2U PRBC transfusion and aflutter/afib with RVR managed with metoprolol. Admitted to MICU for hypotension dependent on pressors likely 2/2 to sepsis.    #Neuro  - patient sedated on propofol and ketamine. Wean off sedation to better evaluate mental status.  - CT head shows foci of low density in the R anterolateral frontal lobe and L posterior temporal lobe concerning for acute infarcts.  - Mechanism likely cardioembolic in setting of patient with known atrial fibrillation with cardiac arrest.  - per neurology, can consider MRI brain w/o contrast. However, unlikely to  since patient has known atrial fibrillation and will need to be continued of full dose anticoagulation.  - Avoid hypotension, keep -180 for neuroprotection  - c/w Atorvastatin 40MG QHS for secondary stroke prevention.    #Cardiovascular  - Cardiac arrest: Patient had asystole night of 7/13 and CPR was started. S/p epi x3 and shock x2 with return to NSR. Cards is following  - TTE on 7/13 showed Mild mitral regurgitation with biventricular dysfunction of LV and RV systolic function. Endocardium was not well visualized, Hypokinesis of the basal inferior wall and basal inferoseptum.  - TRUMAN was scheduled for today to better evaluate the structural and valvular abnormalities. However, the son does not want any procedures to be done until the MRI is done and neurology speaks to the patient. Per neuro, MRI of the brain will not change care at this time. No TRUMAN for today  - Hypotension: MICU was consulted one day prior to transfer for hypotension responsive to Midodrine and 500cc IV bolus. Day of transfer, he presented with bradycardia and worsening hypotension after placement of chest tube. He was given atropine, Chris and Levophed.  - c/w Levophed wean as tolerated.   - BP goal 140-180 to maintain cerebral perfusion after stroke  - Will consider switching to midodrine once HR stable  - Troponins 1805, CKMB 7.1. Troponin stable from prior to cardiac arrest. They are likely 2/2 chronic condition/ESRD on HD vs Hypotensive event. Will continue to trend and monitor.    A. Fib  - on coumadin at home,  - c/ heparin gtt prophylaxis  - from neurology stand point, heparin is sufficient for now. Will need to be switched to DOAC in the future if kidney function allows  - 7/11: tried to transition heparin gtt to eliquis but pt is not a candidate due to his acute MIGUEL; back on heparin gtt   - c/ diltiazem for rate control    #Respiratory  - Loculated Pleural effusions: chest tube removed   - pleural fluid cx: (-); transudative.   - currently saturating adequately on NC @ 6L. Wean as tolerated.  - CXR s/p chest tube removal showed no evidence of atelectasis. Demonstrated loculated left pleural effusion decreased in size and new hazy right lower lung opacity which could be due to a small layering right pleural effusion with associated passive atelectasis, atelectasis of other cause, and/or pneumonia.  - f/u CT chest/abd/pelvis  - abx as below      #GI/Nutrition:  - Diet: Renal diet    - Was C/o abdominal pain, bedside POCUS didn't show ascites. No abdominal pain at this time. Continue to monitor.   - negative occult bleeding, hgb stable  - Per GI, patient is stable to get TRUMAN and does not need endoscopic evaluation prior to TRUMAN.   -Zofran PRN for Nausea    #/Renal  - ESRD: HD on MWF. Most recent Scr 6.40. Potassium is stable.   - Patient received dialysis after cardiac arrest for acidemia on 7/13.  - Spoke with nephro regarding dialysis session today in setting of contrast load yesterday. Awaiting recs.   - c/w Epo 20k tiw with HD      #Skin  - HD Access: fistula in left upper arm  - Improving erythematous and tender on right wrist. Continue to monitor. RUE US neg for thrombus      #ID  - S aureus PCR: (+),  MRSA PCR negative  - BCx on 7/7 now growing MRSA: restarted on vanc (7/11- )  - Vanc level on 7/13 was 24.8. f/u level today  - Will give vanco dose depending on HD today or not  - Surveillance blood Cx have been negative for 48hours, no longer need to continue    #Endocrine  - DMII: c/w Lispro SS. Monitor glucose.      #Hematologic/DVT ppx  - Heparin gtt      #Ethics  - full code as per wife- full code as per wife       69 yo M with PMhx of CAD s/p CABG (course at that time complicated by pleural effusion requiring chest tubes), ESRD on HD (MWF), DMII, HTN, afib on coumadin, and anemia transferred from [facility] for evaluation of chest pain after HD and GIB s/p 2U PRBC transfusion and aflutter/afib with RVR managed with metoprolol. Admitted to MICU for hypotension dependent on pressors likely 2/2 to sepsis.    #Neuro  - patient sedated on propofol and ketamine. Wean off sedation to better evaluate mental status.  - CT head shows foci of low density in the R anterolateral frontal lobe and L posterior temporal lobe concerning for acute infarcts.  - Mechanism likely cardioembolic in setting of patient with known atrial fibrillation with cardiac arrest.  - per neurology, can consider MRI brain w/o contrast. However, unlikely to  since patient has known atrial fibrillation and will need to be continued of full dose anticoagulation.  - Avoid hypotension, keep -180 for neuroprotection  - c/w Atorvastatin 40MG QHS for secondary stroke prevention.    #Cardiovascular  - Cardiac arrest: Patient had asystole night of 7/13 and CPR was started. S/p epi x3 and shock x2 with return to NSR. Cards is following  - TTE on 7/13 showed Mild mitral regurgitation with biventricular dysfunction of LV and RV systolic function. Endocardium was not well visualized, Hypokinesis of the basal inferior wall and basal inferoseptum.  - TRUMAN was scheduled for today to better evaluate the structural and valvular abnormalities. However, the son does not want any procedures to be done until the MRI is done and neurology speaks to the patient. Per neuro, MRI of the brain will not change care at this time. No TRUMAN for today  - Hypotension: MICU was consulted one day prior to transfer for hypotension responsive to Midodrine and 500cc IV bolus. Day of transfer, he presented with bradycardia and worsening hypotension after placement of chest tube. He was given atropine, Chris and Levophed.  - c/w Levophed wean as tolerated.   - BP goal 140-180 to maintain cerebral perfusion after stroke  - Will consider switching to midodrine once HR stable  - Troponins 1805, CKMB 7.1. Troponin stable from prior to cardiac arrest. They are likely 2/2 chronic condition/ESRD on HD vs Hypotensive event. Will continue to trend and monitor.    A. Fib  - on coumadin at home,  - c/ heparin gtt prophylaxis  - from neurology stand point, heparin is sufficient for now. Will need to be switched to DOAC in the future if kidney function allows  - 7/11: tried to transition heparin gtt to eliquis but pt is not a candidate due to his acute MIGUEL; back on heparin gtt   - c/ diltiazem for rate control    #Respiratory  - Loculated Pleural effusions: chest tube removed   - pleural fluid cx: (-); transudative.   - currently intubated s/p cardiac arrest  - CXR s/p chest tube removal showed no evidence of atelectasis. Demonstrated loculated left pleural effusion decreased in size and new hazy right lower lung opacity which could be due to a small layering right pleural effusion with associated passive atelectasis, atelectasis of other cause, and/or pneumonia.  - f/u CT chest/abd/pelvis  - abx as below      #GI/Nutrition:  - Diet: Renal diet    - Was C/o abdominal pain, bedside POCUS didn't show ascites. No abdominal pain at this time. Continue to monitor.   - negative occult bleeding, hgb stable  - Per GI, patient is stable to get TRUMAN and does not need endoscopic evaluation prior to TRUMAN.   -Zofran PRN for Nausea    #/Renal  - ESRD: HD on MWF. Most recent Scr 6.40. Potassium is stable.   - Patient received dialysis after cardiac arrest for acidemia on 7/13.  - Spoke with nephro regarding dialysis session today in setting of contrast load yesterday. Awaiting recs.   - c/w Epo 20k tiw with HD      #Skin  - HD Access: fistula in left upper arm  - Improving erythematous and tender on right wrist. Continue to monitor. RUE US neg for thrombus      #ID  - S aureus PCR: (+),  MRSA PCR negative  - BCx on 7/7 now growing MRSA: restarted on vanc (7/11- )  - Vanc level on 7/13 was 24.8. f/u level today  - Will give vanco dose depending on HD today or not  - Surveillance blood Cx have been negative for 48hours, no longer need to continue    #Endocrine  - DMII: c/w Lispro SS. Monitor glucose.      #Hematologic/DVT ppx  - Heparin gtt      #Ethics  - full code as per wife- full code as per wife

## 2021-07-14 NOTE — PROGRESS NOTE ADULT - SUBJECTIVE AND OBJECTIVE BOX
Mainor Bowers  PGY-1 | Internal Medicine      INTERVAL HPI/OVERNIGHT EVENTS: Decrease patient's levophed and patient was able to tolerate that. No acute events overnight.    SUBJECTIVE: Patient seen and examined at bedside. Intubated and sedated, laying comfortably    ROS: unable to assess    OBJECTIVE:    VITAL SIGNS:  ICU Vital Signs Last 24 Hrs  T(C): 36.9 (14 Jul 2021 04:00), Max: 37.3 (13 Jul 2021 08:00)  T(F): 98.5 (14 Jul 2021 04:00), Max: 99.2 (13 Jul 2021 08:00)  HR: 64 (14 Jul 2021 06:00) (60 - 73)  BP: 179/36 (14 Jul 2021 06:00) (85/71 - 187/24)  BP(mean): 61 (14 Jul 2021 06:00) (49 - 89)  RR: 24 (14 Jul 2021 06:00) (22 - 24)  SpO2: 98% (14 Jul 2021 06:00) (92% - 100%)    Mode: AC/ CMV (Assist Control/ Continuous Mandatory Ventilation), RR (machine): 24, TV (machine): 450, FiO2: 40, PEEP: 5, ITime: 0.85, MAP: 12, PIP: 29    07-13 @ 07:01  -  07-14 @ 07:00  --------------------------------------------------------  IN: 2025.2 mL / OUT: 400 mL / NET: 1625.2 mL      CAPILLARY BLOOD GLUCOSE      POCT Blood Glucose.: 172 mg/dL (14 Jul 2021 05:47)      PHYSICAL EXAM:    General: NAD  HEENT: NC/AT; PERRL, clear conjunctiva  Neck: supple  Respiratory: CTA b/l  Cardiovascular: +S1/S2; RRR  Abdomen: soft, NT/ND; +BS x4  Extremities: WWP, 2+ peripheral pulses b/l; no LE edema  Skin: normal color and turgor; no rash  Neurological:    MEDICATIONS:  MEDICATIONS  (STANDING):  aspirin  chewable 81 milliGRAM(s) Oral daily  atorvastatin 40 milliGRAM(s) Oral at bedtime  chlorhexidine 0.12% Liquid 15 milliLiter(s) Oral Mucosa every 12 hours  chlorhexidine 2% Cloths 1 Application(s) Topical daily  dextrose 40% Gel 15 Gram(s) Oral once  dextrose 40% Gel 15 Gram(s) Oral once  dextrose 5%. 1000 milliLiter(s) (50 mL/Hr) IV Continuous <Continuous>  dextrose 5%. 1000 milliLiter(s) (100 mL/Hr) IV Continuous <Continuous>  dextrose 50% Injectable 25 Gram(s) IV Push once  dextrose 50% Injectable 12.5 Gram(s) IV Push once  dextrose 50% Injectable 25 Gram(s) IV Push once  diltiazem    Tablet 30 milliGRAM(s) Oral four times a day  epoetin danilo-epbx (RETACRIT) Injectable 41808 Unit(s) IV Push <User Schedule>  glucagon  Injectable 1 milliGRAM(s) IntraMuscular once  heparin  Infusion. 1000 Unit(s)/Hr (10 mL/Hr) IV Continuous <Continuous>  insulin lispro (ADMELOG) corrective regimen sliding scale   SubCutaneous every 6 hours  ketamine Infusion. 0.25 mG/kG/Hr (2.16 mL/Hr) IV Continuous <Continuous>  norepinephrine Infusion 0.25 MICROgram(s)/kG/Min (40.4 mL/Hr) IV Continuous <Continuous>  pantoprazole  Injectable 40 milliGRAM(s) IV Push every 12 hours  propofol Infusion 10 MICROgram(s)/kG/Min (5.17 mL/Hr) IV Continuous <Continuous>    MEDICATIONS  (PRN):  acetaminophen   Tablet .. 1000 milliGRAM(s) Oral every 6 hours PRN Temp greater or equal to 38C (100.4F), Mild Pain (1 - 3), Moderate Pain (4 - 6)  calamine/zinc oxide Lotion 1 Application(s) Topical three times a day PRN Itching      ALLERGIES:  Allergies    lisinopril (Other)  opioid-like analgesics (Other)    Intolerances        LABS:                        9.0    14.22 )-----------( 226      ( 14 Jul 2021 03:59 )             29.2     07-14    132<L>  |  92<L>  |  20  ----------------------------<  159<H>  3.5   |  18<L>  |  5.18<H>    Ca    8.1<L>      14 Jul 2021 03:59  Phos  2.8     07-14  Mg     2.00     07-14    TPro  4.7<L>  /  Alb  2.5<L>  /  TBili  0.5  /  DBili  x   /  AST  21  /  ALT  <5<L>  /  AlkPhos  82  07-14    PT/INR - ( 13 Jul 2021 02:50 )   PT: 16.2 sec;   INR: 1.45 ratio         PTT - ( 14 Jul 2021 03:59 )  PTT:79.5 sec      RADIOLOGY & ADDITIONAL TESTS: Reviewed.    < from: CT Head No Cont (07.13.21 @ 18:36) >  IMPRESSION:    Foci of low density in the right anterolateral frontal lobe and left posterior temporal lobeare suspicious for the presence of acute ischemic changes.    No CT evidence for hemorrhagic transformation.    Chronic ischemic changes as described.    < end of copied text >

## 2021-07-14 NOTE — PROGRESS NOTE ADULT - SUBJECTIVE AND OBJECTIVE BOX
Follow Up:      Inverval History/ROS:Patient is a 70y old  Male who presents with a chief complaint of Chest pain and GIB (14 Jul 2021 10:30)      Events noted  S/p arrest- return or rhythm after 10 mon    Ct head with ? infarct    Allergies    lisinopril (Other)  opioid-like analgesics (Other)    Intolerances        ANTIMICROBIALS:      OTHER MEDS:  acetaminophen   Tablet .. 1000 milliGRAM(s) Oral every 6 hours PRN  aspirin  chewable 81 milliGRAM(s) Oral daily  atorvastatin 40 milliGRAM(s) Oral at bedtime  calamine/zinc oxide Lotion 1 Application(s) Topical three times a day PRN  chlorhexidine 0.12% Liquid 15 milliLiter(s) Oral Mucosa every 12 hours  chlorhexidine 2% Cloths 1 Application(s) Topical daily  dextrose 40% Gel 15 Gram(s) Oral once  dextrose 40% Gel 15 Gram(s) Oral once  dextrose 5%. 1000 milliLiter(s) IV Continuous <Continuous>  dextrose 5%. 1000 milliLiter(s) IV Continuous <Continuous>  dextrose 50% Injectable 25 Gram(s) IV Push once  dextrose 50% Injectable 12.5 Gram(s) IV Push once  dextrose 50% Injectable 25 Gram(s) IV Push once  diltiazem    Tablet 30 milliGRAM(s) Oral four times a day  epoetin danilo-epbx (RETACRIT) Injectable 18382 Unit(s) IV Push <User Schedule>  glucagon  Injectable 1 milliGRAM(s) IntraMuscular once  heparin  Infusion. 1000 Unit(s)/Hr IV Continuous <Continuous>  insulin lispro (ADMELOG) corrective regimen sliding scale   SubCutaneous every 6 hours  ketamine Infusion. 0.25 mG/kG/Hr IV Continuous <Continuous>  norepinephrine Infusion 0.05 MICROgram(s)/kG/Min IV Continuous <Continuous>  pantoprazole  Injectable 40 milliGRAM(s) IV Push every 12 hours  propofol Infusion 10 MICROgram(s)/kG/Min IV Continuous <Continuous>      Vital Signs Last 24 Hrs  T(C): 37.1 (14 Jul 2021 08:00), Max: 37.1 (14 Jul 2021 00:00)  T(F): 98.7 (14 Jul 2021 08:00), Max: 98.7 (14 Jul 2021 00:00)  HR: 65 (14 Jul 2021 10:00) (60 - 73)  BP: 145/47 (14 Jul 2021 10:00) (85/71 - 187/24)  BP(mean): 69 (14 Jul 2021 10:00) (49 - 89)  RR: 24 (14 Jul 2021 10:00) (22 - 24)  SpO2: 99% (14 Jul 2021 10:00) (92% - 100%)    PHYSICAL EXAM:  General: [x ] intubated  HEAD/EYES: [ ] PERRL [x ] white sclera [ ] icterus  ENT:  [ ] normal [x ] supple [ ] thrush [ ] pharyngeal exudate  Cardiovascular:   [ ] murmur [x ] normal [ ] PPM/AICD  Respiratory:  [ x] course BS  GI:  [x ] soft, non-tender, normal bowel sounds  :  [ ] wagner [ x] no CVA tenderness   Musculoskeletal:  [ x] no synovitis  Neurologic:  [ ] non-focal exam   Skin:  [x ] no rash  Lymph: [x ] no lymphadenopathy  Psychiatric:  [ ] appropriate affect [ ] alert & oriented  Lines:  [ ] no phlebitis [ x] Left UE HD access                                  9.0    14.22 )-----------( 226      ( 14 Jul 2021 03:59 )             29.2       07-14    132<L>  |  92<L>  |  20  ----------------------------<  159<H>  3.5   |  18<L>  |  5.18<H>    Ca    8.1<L>      14 Jul 2021 03:59  Phos  2.8     07-14  Mg     2.00     07-14    TPro  4.7<L>  /  Alb  2.5<L>  /  TBili  0.5  /  DBili  x   /  AST  21  /  ALT  <5<L>  /  AlkPhos  82  07-14          MICROBIOLOGY:Culture Results:   No growth to date. (07-12-21 @ 06:12)  Culture Results:   No growth to date. (07-11-21 @ 23:16)  Culture Results:   Growth in aerobic and anaerobic bottles: Methicillin resistant  Staphylococcus aureus  See previous culture [id number] (07-09-21 @ 08:17)  Culture Results:   No Growth Final (07-09-21 @ 03:47)  Culture Results:   No growth at 5 days (07-08-21 @ 17:34)  Culture Results:   Growth in aerobic and anaerobic bottles: Methicillin resistant  Staphylococcus aureus  ***Blood Panel PCR results on this specimen are available  approximately 3 hours after the Gram stain result.***  Gram stain, PCR, and/or culture results may notalways  correspond due to difference in methodologies.  ************************************************************  This PCR assay was performed by multiplex PCR. This  Assay tests for 66 bacterial and resistance gene targets.  Please refer to the [facility] [employer] test directory  at [email or web address] for details. (07-07-21 @ 23:06)  Culture Results:   Growth in aerobic and anaerobic bottles: Methicillin resistant  Staphylococcus aureus  See previous culture [id number] (07-07-21 @ 23:05)      RADIOLOGY:  < from: CT Chest w/ IV Cont (07.13.21 @ 18:36) >  IMPRESSION:  *  Mild pulmonary edema with bilateral partially loculated pleural effusions with adjacent passive atelectasis.  *  Debris within the right mainstem with associated partial atelectasis of the right lower lobe.  *  Mild to moderate ascites.    < end of copied text >  < from: CT Chest w/ IV Cont (07.13.21 @ 18:36) >  IMPRESSION:  *  Mild pulmonary edema with bilateral partially loculated pleural effusions with adjacent passive atelectasis.  *  Debris within the right mainstem with associated partial atelectasis of the right lower lobe.  *  Mild to moderate ascites.    < end of copied text >    < from: CT Head No Cont (07.13.21 @ 18:36) >  IMPRESSION:    Foci of low density in the right anterolateral frontal lobe and left posterior temporal lobeare suspicious for the presence of acute ischemic changes.    No CT evidence for hemorrhagic transformation.    Chronic ischemic changes as described.    < end of copied text >

## 2021-07-14 NOTE — PROGRESS NOTE ADULT - SUBJECTIVE AND OBJECTIVE BOX
EP ATTENDING    tele: AFib  prior PEA/Jay/Asystole arrest.  resuscitated and intubated, on pressors.  antibiotics for MRSA bacteremia.  CT head w/ interval hypodensities concerning for stroke.  ROS unobtainable due to sedation    acetaminophen   Tablet .. 1000 milliGRAM(s) Oral every 6 hours PRN  aspirin  chewable 81 milliGRAM(s) Oral daily  atorvastatin 40 milliGRAM(s) Oral at bedtime  calamine/zinc oxide Lotion 1 Application(s) Topical three times a day PRN  chlorhexidine 0.12% Liquid 15 milliLiter(s) Oral Mucosa every 12 hours  chlorhexidine 2% Cloths 1 Application(s) Topical daily  dextrose 40% Gel 15 Gram(s) Oral once  dextrose 40% Gel 15 Gram(s) Oral once  dextrose 5%. 1000 milliLiter(s) IV Continuous <Continuous>  dextrose 5%. 1000 milliLiter(s) IV Continuous <Continuous>  dextrose 50% Injectable 25 Gram(s) IV Push once  dextrose 50% Injectable 12.5 Gram(s) IV Push once  dextrose 50% Injectable 25 Gram(s) IV Push once  diltiazem    Tablet 30 milliGRAM(s) Oral four times a day  epoetin danilo-epbx (RETACRIT) Injectable 46435 Unit(s) IV Push <User Schedule>  glucagon  Injectable 1 milliGRAM(s) IntraMuscular once  heparin  Infusion. 1000 Unit(s)/Hr IV Continuous <Continuous>  insulin lispro (ADMELOG) corrective regimen sliding scale   SubCutaneous every 6 hours  norepinephrine Infusion 0.05 MICROgram(s)/kG/Min IV Continuous <Continuous>  pantoprazole  Injectable 40 milliGRAM(s) IV Push every 12 hours  propofol Infusion 10 MICROgram(s)/kG/Min IV Continuous <Continuous>                            9.0    14.22 )-----------( 226      ( 14 Jul 2021 03:59 )             29.2       07-14    132<L>  |  92<L>  |  20  ----------------------------<  159<H>  3.5   |  18<L>  |  5.18<H>    Ca    8.1<L>      14 Jul 2021 03:59  Phos  2.8     07-14  Mg     2.00     07-14    TPro  4.7<L>  /  Alb  2.5<L>  /  TBili  0.5  /  DBili  x   /  AST  21  /  ALT  <5<L>  /  AlkPhos  82  07-14      CARDIAC MARKERS ( 14 Jul 2021 03:59 )  x     / x     / 29 U/L / x     / x      CARDIAC MARKERS ( 13 Jul 2021 02:50 )  x     / x     / 50 U/L / x     / 7.1 ng/mL  CARDIAC MARKERS ( 12 Jul 2021 01:49 )  x     / x     / 56 U/L / x     / x            T(C): 37.4 (07-14-21 @ 16:00), Max: 37.4 (07-14-21 @ 16:00)  HR: 66 (07-14-21 @ 17:48) (59 - 73)  BP: 133/40 (07-14-21 @ 17:48) (85/71 - 187/24)  RR: 24 (07-14-21 @ 17:48) (23 - 24)  SpO2: 98% (07-14-21 @ 17:48) (95% - 100%)  Wt(kg): --    I&O's Summary    13 Jul 2021 07:01  -  14 Jul 2021 07:00  --------------------------------------------------------  IN: 2025.2 mL / OUT: 400 mL / NET: 1625.2 mL    14 Jul 2021 07:01  -  14 Jul 2021 18:10  --------------------------------------------------------  IN: 667.6 mL / OUT: 0 mL / NET: 667.6 mL      General: ill appearing, intubated and sedated  Head: Normocephalic and atraumatic.   Neck: No JVD. No bruits. Supple. Does not appear to be enlarged.  Left IJ CVL.  Intubated.    Cardiovascular: + S1,S2 ; IRR Soft systolic murmur at the left lower sternal border. No rubs noted.    Lungs: coarse breath sounds bL.   Abdomen: + BS, soft. Non distended.   Extremities: limbs warm, anasarca.  Psychiatric: unable to assess due to sedation  Musculoskeletal: unable to assess strength and ROM due to sedation.    A/P) 69 y/o male PMH CAD s/p CABG, PAF/AFL on coumadin, HTN, hyperlipidemia, DM, ESRD on HD, hypothyroidism a/w sepsis    -continue diltiazem for AF rate control.  -heparin infusion for secondary stroke prevention  - s/p PEA/jay/asystole arrest, secondary to critical illness and infection.  no pacemaker or ICD indicated.  -no further inpatient EP workup needed    - will follow.    Pierce Nguyen M.D.  Cardiac Electrophysiology  [phone]

## 2021-07-14 NOTE — PROGRESS NOTE ADULT - ASSESSMENT
70 year old male with possible GI bleed     Anemia  negative occult w/o overt gi bleeding  Monitor CBC and Keep hemoglobin > 8 given cardiac issues   PPI BID w/Maalox PRN    No GI objection to ASA or Heparin gtt   Monitor stool color  No GI objection to proceeding w/TRUMAN (H/H stable, negative occult, brown stools)    Ascites  2/2 RHF w/dilated IVC on CT   diuresis per cardiology     Cardiac Arrest  Care per cardiology and MICU appreciated     Pleural Effusion:  s/p chest tube   care per CTSx appreciated     Advanced care planning was discussed with patient.  Advanced care planning forms were reviewed and discussed.  Risks, benefits and alternatives of gastroenterologic procedures were discussed in detail and all questions were answered.  30 minutes spent.

## 2021-07-14 NOTE — PROGRESS NOTE ADULT - SUBJECTIVE AND OBJECTIVE BOX
Date of Service: 07-14-21 @ 14:22    Patient is a 70y old  Male who presents with a chief complaint of Chest pain and GIB (14 Jul 2021 11:05)      Any change in ROS: remains intubated and sedated : on heparin drip     MEDICATIONS  (STANDING):  aspirin  chewable 81 milliGRAM(s) Oral daily  atorvastatin 40 milliGRAM(s) Oral at bedtime  chlorhexidine 0.12% Liquid 15 milliLiter(s) Oral Mucosa every 12 hours  chlorhexidine 2% Cloths 1 Application(s) Topical daily  dextrose 40% Gel 15 Gram(s) Oral once  dextrose 40% Gel 15 Gram(s) Oral once  dextrose 5%. 1000 milliLiter(s) (50 mL/Hr) IV Continuous <Continuous>  dextrose 5%. 1000 milliLiter(s) (100 mL/Hr) IV Continuous <Continuous>  dextrose 50% Injectable 25 Gram(s) IV Push once  dextrose 50% Injectable 12.5 Gram(s) IV Push once  dextrose 50% Injectable 25 Gram(s) IV Push once  diltiazem    Tablet 30 milliGRAM(s) Oral four times a day  epoetin danilo-epbx (RETACRIT) Injectable 25369 Unit(s) IV Push <User Schedule>  glucagon  Injectable 1 milliGRAM(s) IntraMuscular once  heparin  Infusion. 1000 Unit(s)/Hr (10 mL/Hr) IV Continuous <Continuous>  insulin lispro (ADMELOG) corrective regimen sliding scale   SubCutaneous every 6 hours  norepinephrine Infusion 0.05 MICROgram(s)/kG/Min (4.04 mL/Hr) IV Continuous <Continuous>  pantoprazole  Injectable 40 milliGRAM(s) IV Push every 12 hours  propofol Infusion 10 MICROgram(s)/kG/Min (5.17 mL/Hr) IV Continuous <Continuous>    MEDICATIONS  (PRN):  acetaminophen   Tablet .. 1000 milliGRAM(s) Oral every 6 hours PRN Temp greater or equal to 38C (100.4F), Mild Pain (1 - 3), Moderate Pain (4 - 6)  calamine/zinc oxide Lotion 1 Application(s) Topical three times a day PRN Itching    Vital Signs Last 24 Hrs  T(C): 36.9 (14 Jul 2021 12:00), Max: 37.1 (14 Jul 2021 00:00)  T(F): 98.5 (14 Jul 2021 12:00), Max: 98.7 (14 Jul 2021 00:00)  HR: 64 (14 Jul 2021 13:00) (59 - 73)  BP: 157/47 (14 Jul 2021 13:00) (85/71 - 187/24)  BP(mean): 73 (14 Jul 2021 13:00) (50 - 89)  RR: 24 (14 Jul 2021 13:00) (23 - 24)  SpO2: 100% (14 Jul 2021 13:00) (95% - 100%)  Mode: AC/ CMV (Assist Control/ Continuous Mandatory Ventilation)  RR (machine): 24  TV (machine): 450  FiO2: 40  PEEP: 5  ITime: 0.87  MAP: 16  PIP: 33    I&O's Summary    13 Jul 2021 07:01  -  14 Jul 2021 07:00  --------------------------------------------------------  IN: 2025.2 mL / OUT: 400 mL / NET: 1625.2 mL    14 Jul 2021 07:01  -  14 Jul 2021 14:22  --------------------------------------------------------  IN: 503.2 mL / OUT: 0 mL / NET: 503.2 mL          Physical Exam:   GENERAL: NAD, well-groomed, well-developed  HEENT: KATHY/   Atraumatic, Normocephalic  ENMT: No tonsillar erythema, exudates, or enlargement; Moist mucous membranes, Good dentition, No lesions  NECK: Supple, No JVD, Normal thyroid  CHEST/LUNG: Clear to auscultaion  CVS: Regular rate and rhythm; No murmurs, rubs, or gallops  GI: : Soft, Nontender, Nondistended; Bowel sounds present  NERVOUS SYSTEM:  Intuabted and sedated and is on pressors:   EXTREMITIES:  -edema  LYMPH: No lymphadenopathy noted  SKIN: No rashes or lesions  ENDOCRINOLOGY: No Thyromegaly  PSYCH: calm+    Labs:  ABG - ( 13 Jul 2021 02:50 )  pH, Arterial: 7.29  pH, Blood: x     /  pCO2: 46    /  pO2: 113   / HCO3: 21    / Base Excess: -4.0  /  SaO2: 98.3            22, 26  CARDIAC MARKERS ( 14 Jul 2021 03:59 )  x     / x     / 29 U/L / x     / x      CARDIAC MARKERS ( 13 Jul 2021 02:50 )  x     / x     / 50 U/L / x     / 7.1 ng/mL                            9.0    14.22 )-----------( 226      ( 14 Jul 2021 03:59 )             29.2                         9.1    13.95 )-----------( 220      ( 13 Jul 2021 02:50 )             30.6                         8.9    13.60 )-----------( 234      ( 12 Jul 2021 21:14 )             29.7                         9.0    13.27 )-----------( 218      ( 12 Jul 2021 08:31 )             29.8                         9.3    18.00 )-----------( 255      ( 12 Jul 2021 01:47 )             31.8                         9.1    15.61 )-----------( 221      ( 11 Jul 2021 18:38 )             30.2                         9.5    18.48 )-----------( 208      ( 11 Jul 2021 06:36 )             31.8     07-14    132<L>  |  92<L>  |  20  ----------------------------<  159<H>  3.5   |  18<L>  |  5.18<H>  07-13    131<L>  |  92<L>  |  24<H>  ----------------------------<  201<H>  3.6   |  18<L>  |  6.40<H>  07-12    130<L>  |  92<L>  |  23  ----------------------------<  183<H>  4.2   |  21<L>  |  6.05<H>  07-12    130<L>  |  90<L>  |  33<H>  ----------------------------<  207<H>  5.0   |  19<L>  |  8.49<H>  07-11    125<L>  |  91<L>  |  32<H>  ----------------------------<  235<H>  4.7   |  20<L>  |  8.56<H>  07-11    128<L>  |  92<L>  |  28<H>  ----------------------------<  179<H>  4.4   |  20<L>  |  7.83<H>    Ca    8.1<L>      14 Jul 2021 03:59  Ca    9.9      13 Jul 2021 02:50  Ca    8.4      12 Jul 2021 21:14  Phos  2.8     07-14  Phos  5.9     07-13  Phos  4.5     07-12  Mg     2.00     07-14  Mg     2.80     07-13  Mg     1.70     07-12    TPro  4.7<L>  /  Alb  2.5<L>  /  TBili  0.5  /  DBili  x   /  AST  21  /  ALT  <5<L>  /  AlkPhos  82  07-14  TPro  5.7<L>  /  Alb  3.0<L>  /  TBili  0.5  /  DBili  x   /  AST  24  /  ALT  5   /  AlkPhos  92  07-13  TPro  5.7<L>  /  Alb  3.0<L>  /  TBili  0.4  /  DBili  x   /  AST  24  /  ALT  5   /  AlkPhos  93  07-12  TPro  5.7<L>  /  Alb  3.0<L>  /  TBili  0.4  /  DBili  x   /  AST  27  /  ALT  8   /  AlkPhos  90  07-12  TPro  5.6<L>  /  Alb  3.0<L>  /  TBili  0.4  /  DBili  x   /  AST  25  /  ALT  9   /  AlkPhos  88  07-11  TPro  5.9<L>  /  Alb  2.9<L>  /  TBili  0.5  /  DBili  x   /  AST  30  /  ALT  14  /  AlkPhos  88  07-11    CAPILLARY BLOOD GLUCOSE      POCT Blood Glucose.: 180 mg/dL (14 Jul 2021 11:37)  POCT Blood Glucose.: 172 mg/dL (14 Jul 2021 05:47)  POCT Blood Glucose.: 162 mg/dL (13 Jul 2021 23:55)  POCT Blood Glucose.: 132 mg/dL (13 Jul 2021 17:04)      LIVER FUNCTIONS - ( 14 Jul 2021 03:59 )  Alb: 2.5 g/dL / Pro: 4.7 g/dL / ALK PHOS: 82 U/L / ALT: <5 U/L / AST: 21 U/L / GGT: x           PT/INR - ( 13 Jul 2021 02:50 )   PT: 16.2 sec;   INR: 1.45 ratio         PTT - ( 14 Jul 2021 03:59 )  PTT:79.5 sec    Procalcitonin, Serum: 3.32 ng/mL (07-12 @ 01:49)  Fluid Source PLEURAL  Albumin, Fluid--  Glucose, Waqxa232 mg/dL  Protein total, Fluid3.2 g/dL  Lacatate Dehydrogenase, Fluid67 U/L  pH, Fluid--  Cytopathology-Non Gyn Report--        RECENT CULTURES:  07-12 @ 06:12 .Blood Blood       ad< from: CT Abdomen and Pelvis w/ IV Cont (07.13.21 @ 18:37) >  VESSELS: Left IJ approach central venous catheter with the tip in the left brachiocephalic vein. Air is seen within the left brachiocephalic, likely secondary to injections. Left axillary vein stent is patent. Calcifications of the aortic arch and coronary vessels. CABG.  HEART: Cardiomegaly. Left ventricular free wall fatty replacement, likely related to prior myocardial infarction. No pericardial effusion. Retained epicardial pacemaker leads.  MEDIASTINUM AND YOANA: No lymphadenopathy.  CHEST WALL AND LOWER NECK: Enlarged thyroid gland with multiple hypodense nodules bilaterally, the right containing calcifications, appear unchanged. Median sternotomy.  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  BILE DUCTS: Normal caliber.  GALLBLADDER: Within normal limits.  SPLEEN: Within normal limits.  PANCREAS: Within normal limits.  ADRENALS: Within normal limits.  KIDNEYS/URETERS: Atrophic bilateral kidneys. No hydronephrosis. Exophytic 1.3 cm cyst arising from the left kidney.    BLADDER: Decompressed.  REPRODUCTIVE ORGANS: Prostate within normal limits. Bilateral hydroceles.    BOWEL: No bowel obstruction. Appendix is normal. Enteric tube is present with the tip in stomach.  PERITONEUM: Mild to moderate ascites in the perihepatic and perisplenic regions, tracking along the paracolic gutters. Trace ascites in the pelvis.  VESSELS: Atherosclerotic changes.  RETROPERITONEUM/LYMPH NODES: No lymphadenopathy.  ABDOMINAL WALL: Diffuse subcutaneous edema.  BONES: Degenerative changes.    IMPRESSION:  *  Mild pulmonary edema with bilateral partially loculated pleural effusions with adjacent passive atelectasis.  *  Debris within the right mainstem with associated partial atelectasis of the right lower lobe.  *  Mild to moderate ascites.    --- End of Report ---    < end of copied text >           No growth to date.    07-11 @ 23:16 .Blood Blood-Peripheral       < from: CT Chest w/ IV Cont (07.13.21 @ 18:36) >  groundglass opacities.  VESSELS: Left IJ approach central venous catheter with the tip in the left brachiocephalic vein. Air is seen within the left brachiocephalic, likely secondary to injections. Left axillary vein stent is patent. Calcifications of the aortic arch and coronary vessels. CABG.  HEART: Cardiomegaly. Left ventricular free wall fatty replacement, likely related to prior myocardial infarction. No pericardial effusion. Retained epicardial pacemaker leads.  MEDIASTINUM AND YOANA: No lymphadenopathy.  CHEST WALL AND LOWER NECK: Enlarged thyroid gland with multiple hypodense nodules bilaterally, the right containing calcifications, appear unchanged. Median sternotomy.  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  BILE DUCTS: Normal caliber.  GALLBLADDER: Within normal limits.  SPLEEN: Within normal limits.  PANCREAS: Within normal limits.  ADRENALS: Within normal limits.  KIDNEYS/URETERS: Atrophic bilateral kidneys. No hydronephrosis. Exophytic 1.3 cm cyst arising from the left kidney.    BLADDER: Decompressed.  REPRODUCTIVE ORGANS: Prostate within normal limits. Bilateral hydroceles.    BOWEL: No bowel obstruction. Appendix is normal. Enteric tube is present with the tip in stomach.  PERITONEUM: Mild to moderate ascites in the perihepatic and perisplenic regions, tracking along the paracolic gutters. Trace ascites in the pelvis.  VESSELS: Atherosclerotic changes.  RETROPERITONEUM/LYMPH NODES: No lymphadenopathy.  ABDOMINAL WALL: Diffuse subcutaneous edema.  BONES: Degenerative changes.    IMPRESSION:  *  Mild pulmonary edema with bilateral partially loculated pleural effusions with adjacent passive atelectasis.  *  Debris within the right mainstem with associated partial atelectasis of the right lower lobe.  *  Mild to moderate ascites.    --- End of Report ---            TRU WEBER MD; Resident Radiology  This document has been electronically signed.  MIKE NOLAN MD; Attending Radiologist  This document has been electronically signed. Jul 14 2021  9:42AM    < end of copied text >           No growth to date.    07-09 @ 08:17 .Blood Blood-Peripheral       Growth in aerobic and anaerobic bottles: Gram Positive Cocci in Clusters           Growth in aerobic and anaerobic bottles: Methicillin resistant  Staphylococcus aureus  See previous culture [id number]    07-09 @ 03:47 .Blood Blood                No Growth Final    07-08 @ 17:34 .Body Fluid pleural fluid       polymorphonuclear leukocytes seen  No organisms seen  by cytocentrifuge           No growth at 5 days    07-07 @ 23:06 .Blood Blood-Venous   PCR    Growth in aerobic bottle: Gram Positive Cocci in Clusters  Growth in anaerobic bottle: Gram Positive Cocci in Clusters    Blood Culture PCR  Methicillin resistant Staphylococcus aureus  Blood Culture PCR     Growth in aerobic and anaerobic bottles: Methicillin resistant  Staphylococcus aureus  ***Blood Panel PCR results on this specimen are available  approximately 3 hours after the Gram stain result.***  Gram stain, PCR, and/or culture results may notalways  correspond due to difference in methodologies.  ************************************************************  This PCR assay was performed by multiplex PCR. This  Assay tests for 66 bacterial and resistance gene targets.  Please refer to the [facility] test directory  at [email or web address] for details.    07-07 @ 23:05 .Blood Blood       Growth in aerobic bottle: Gram Positive Cocci in Clusters  Growth in anaerobic bottle: Gram Positive Cocci in Clusters           Growth in aerobic and anaerobic bottles: Methicillin resistant  Staphylococcus aureus  See previous culture [id number]          RESPIRATORY CULTURES:          Studies  Chest X-RAY  CT SCAN Chest   Venous Dopplers: LE:   CT Abdomen  Others        < from: CT Head No Cont (07.13.21 @ 18:36) >  CLINICAL INDICATION:  70M s/p cardiac arrest    TECHNIQUE : Axial CT scanning of the brain was obtained from the skull base to the vertex without the administration of intravenous contrast. Sagittal and coronal reformats were provided.    COMPARISON: CT brain 7/5/2020. MRI brain 1/21/2020    FINDINGS:    Foci of low density in the right anterolateral frontal lobe and left posterior temporal lobe are suspicious for the presence of acute ischemic changes.    No CT evidence for hemorrhagic transformation.    Redemonstration of foci of low density involving the right posterolateral frontal lobe, right posterior temporal lobe, and left inferior cerebellar hemisphere, consistent with chronic ischemic changes.    No hydrocephalus, midline shift, or effacement of basal cisterns.    IMPRESSION:    Foci of low density in the right anterolateral frontal lobe and left posterior temporal lobeare suspicious for the presence of acute ischemic changes.    No CT evidence for hemorrhagic transformation.    Chronic ischemic changes as described.    Dr. Espino discussed these findings with Dr. Alcala on 7/13/2021 6:56 PM with read back.      --- End of Report ---              ARMANDO ESPINO MD; Attending Radiologist  This document has been electronically signed. Jul 13 2021  6:57PM    < end of copied text >

## 2021-07-14 NOTE — PROGRESS NOTE ADULT - ATTENDING COMMENTS
Agree with above. Seen and examined with team on rounds. Critically ill requiring frequent bedside visits. Weaning trials as tolerated. Slight improvement of mental status. GOC discussion with family continues. Supportive care. Agree with above. Seen and examined with team on rounds. Critically ill requiring frequent bedside visits. Weaning trials as tolerated. Awaiting TRUMAN as per cardiology. Latest blood cultures negative. Supportive care.

## 2021-07-14 NOTE — PROGRESS NOTE ADULT - ASSESSMENT
70 year old with ESRD, DM, CAD presented with anemia and chest pain    Recently diagnosed Staph aureus bacteremia  Course complicated by arrest, imaging with ? CVA    1)Staph bacteremia- ? phlebitis as focus  -prelim results suggested MSSA  now identified as MRSA- did have vanco dose on 7/9; redosed 7/11  Cefazolin changed to vancomycin  Dose Vancomycin based on daily level     Repeat blood cultures 7/9 with growth in 2 of 4 bottles  Repeat blood culture 7/11 and 7/12 without growth    Check TRUMAN  Consider imaging LS spine when more stable    LUE HD access appears to be a fistula but history states graft- consider US  to confirm    2) Fever  Likely due to bacteremia  Monitor    3) Pleural effusion  S/p thoracentesis- appears transudative but follow up Culture    4) Leukocytosis  Continue to monitor

## 2021-07-15 LAB
ALBUMIN SERPL ELPH-MCNC: 2.1 G/DL — LOW (ref 3.3–5)
ALP SERPL-CCNC: 89 U/L — SIGNIFICANT CHANGE UP (ref 40–120)
ALT FLD-CCNC: <5 U/L — LOW (ref 4–41)
ANION GAP SERPL CALC-SCNC: 19 MMOL/L — HIGH (ref 7–14)
APTT BLD: 29.1 SEC — SIGNIFICANT CHANGE UP (ref 27–36.3)
APTT BLD: 29.8 SEC — SIGNIFICANT CHANGE UP (ref 27–36.3)
APTT BLD: >200 SEC — SIGNIFICANT CHANGE UP (ref 27–36.3)
AST SERPL-CCNC: 17 U/L — SIGNIFICANT CHANGE UP (ref 4–40)
BASOPHILS # BLD AUTO: 0.07 K/UL — SIGNIFICANT CHANGE UP (ref 0–0.2)
BASOPHILS NFR BLD AUTO: 0.6 % — SIGNIFICANT CHANGE UP (ref 0–2)
BILIRUB SERPL-MCNC: 0.4 MG/DL — SIGNIFICANT CHANGE UP (ref 0.2–1.2)
BLOOD GAS ARTERIAL COMPREHENSIVE RESULT: SIGNIFICANT CHANGE UP
BUN SERPL-MCNC: 28 MG/DL — HIGH (ref 7–23)
CALCIUM SERPL-MCNC: 7.7 MG/DL — LOW (ref 8.4–10.5)
CHLORIDE SERPL-SCNC: 96 MMOL/L — LOW (ref 98–107)
CO2 SERPL-SCNC: 20 MMOL/L — LOW (ref 22–31)
CREAT SERPL-MCNC: 6.4 MG/DL — HIGH (ref 0.5–1.3)
EOSINOPHIL # BLD AUTO: 0.25 K/UL — SIGNIFICANT CHANGE UP (ref 0–0.5)
EOSINOPHIL NFR BLD AUTO: 2.3 % — SIGNIFICANT CHANGE UP (ref 0–6)
GLUCOSE BLDC GLUCOMTR-MCNC: 171 MG/DL — HIGH (ref 70–99)
GLUCOSE BLDC GLUCOMTR-MCNC: 178 MG/DL — HIGH (ref 70–99)
GLUCOSE BLDC GLUCOMTR-MCNC: 181 MG/DL — HIGH (ref 70–99)
GLUCOSE SERPL-MCNC: 173 MG/DL — HIGH (ref 70–99)
HCT VFR BLD CALC: 26.9 % — LOW (ref 39–50)
HGB BLD-MCNC: 8.2 G/DL — LOW (ref 13–17)
IANC: 7.58 K/UL — SIGNIFICANT CHANGE UP (ref 1.5–8.5)
IMM GRANULOCYTES NFR BLD AUTO: 4.4 % — HIGH (ref 0–1.5)
INR BLD: 1.44 RATIO — HIGH (ref 0.88–1.16)
LYMPHOCYTES # BLD AUTO: 1.21 K/UL — SIGNIFICANT CHANGE UP (ref 1–3.3)
LYMPHOCYTES # BLD AUTO: 11 % — LOW (ref 13–44)
MAGNESIUM SERPL-MCNC: 2 MG/DL — SIGNIFICANT CHANGE UP (ref 1.6–2.6)
MCHC RBC-ENTMCNC: 27.8 PG — SIGNIFICANT CHANGE UP (ref 27–34)
MCHC RBC-ENTMCNC: 30.5 GM/DL — LOW (ref 32–36)
MCV RBC AUTO: 91.2 FL — SIGNIFICANT CHANGE UP (ref 80–100)
MONOCYTES # BLD AUTO: 1.39 K/UL — HIGH (ref 0–0.9)
MONOCYTES NFR BLD AUTO: 12.7 % — SIGNIFICANT CHANGE UP (ref 2–14)
NEUTROPHILS # BLD AUTO: 7.58 K/UL — HIGH (ref 1.8–7.4)
NEUTROPHILS NFR BLD AUTO: 69 % — SIGNIFICANT CHANGE UP (ref 43–77)
NRBC # BLD: 0 /100 WBCS — SIGNIFICANT CHANGE UP
NRBC # FLD: 0.09 K/UL — HIGH
PLATELET # BLD AUTO: 162 K/UL — SIGNIFICANT CHANGE UP (ref 150–400)
POTASSIUM SERPL-MCNC: 3.3 MMOL/L — LOW (ref 3.5–5.3)
POTASSIUM SERPL-SCNC: 3.3 MMOL/L — LOW (ref 3.5–5.3)
PROT SERPL-MCNC: 4.6 G/DL — LOW (ref 6–8.3)
PROTHROM AB SERPL-ACNC: 16.1 SEC — HIGH (ref 10.6–13.6)
RBC # BLD: 2.95 M/UL — LOW (ref 4.2–5.8)
RBC # FLD: 19.4 % — HIGH (ref 10.3–14.5)
SODIUM SERPL-SCNC: 135 MMOL/L — SIGNIFICANT CHANGE UP (ref 135–145)
TROPONIN T, HIGH SENSITIVITY RESULT: 2463 NG/L — CRITICAL HIGH
VANCOMYCIN FLD-MCNC: 17.9 UG/ML — SIGNIFICANT CHANGE UP
WBC # BLD: 10.98 K/UL — HIGH (ref 3.8–10.5)
WBC # FLD AUTO: 10.98 K/UL — HIGH (ref 3.8–10.5)

## 2021-07-15 PROCEDURE — 76604 US EXAM CHEST: CPT | Mod: 26,GC

## 2021-07-15 PROCEDURE — 99233 SBSQ HOSP IP/OBS HIGH 50: CPT | Mod: GC

## 2021-07-15 PROCEDURE — 93990 DOPPLER FLOW TESTING: CPT | Mod: 26

## 2021-07-15 PROCEDURE — 93321 DOPPLER ECHO F-UP/LMTD STD: CPT | Mod: 26,GC

## 2021-07-15 PROCEDURE — 99291 CRITICAL CARE FIRST HOUR: CPT

## 2021-07-15 PROCEDURE — 93308 TTE F-UP OR LMTD: CPT | Mod: 26,GC

## 2021-07-15 RX ORDER — VANCOMYCIN HCL 1 G
750 VIAL (EA) INTRAVENOUS ONCE
Refills: 0 | Status: COMPLETED | OUTPATIENT
Start: 2021-07-15 | End: 2021-07-16

## 2021-07-15 RX ORDER — POTASSIUM CHLORIDE 20 MEQ
40 PACKET (EA) ORAL ONCE
Refills: 0 | Status: COMPLETED | OUTPATIENT
Start: 2021-07-15 | End: 2021-07-15

## 2021-07-15 RX ORDER — POTASSIUM CHLORIDE 20 MEQ
10 PACKET (EA) ORAL
Refills: 0 | Status: DISCONTINUED | OUTPATIENT
Start: 2021-07-15 | End: 2021-07-15

## 2021-07-15 RX ADMIN — CHLORHEXIDINE GLUCONATE 15 MILLILITER(S): 213 SOLUTION TOPICAL at 18:05

## 2021-07-15 RX ADMIN — HEPARIN SODIUM 1400 UNIT(S)/HR: 5000 INJECTION INTRAVENOUS; SUBCUTANEOUS at 06:16

## 2021-07-15 RX ADMIN — PANTOPRAZOLE SODIUM 40 MILLIGRAM(S): 20 TABLET, DELAYED RELEASE ORAL at 05:22

## 2021-07-15 RX ADMIN — PROPOFOL 5.17 MICROGRAM(S)/KG/MIN: 10 INJECTION, EMULSION INTRAVENOUS at 07:57

## 2021-07-15 RX ADMIN — HEPARIN SODIUM 0 UNIT(S)/HR: 5000 INJECTION INTRAVENOUS; SUBCUTANEOUS at 23:58

## 2021-07-15 RX ADMIN — Medication 4.04 MICROGRAM(S)/KG/MIN: at 07:57

## 2021-07-15 RX ADMIN — PANTOPRAZOLE SODIUM 40 MILLIGRAM(S): 20 TABLET, DELAYED RELEASE ORAL at 18:04

## 2021-07-15 RX ADMIN — Medication 40 MILLIEQUIVALENT(S): at 07:57

## 2021-07-15 RX ADMIN — Medication 100 MILLIEQUIVALENT(S): at 06:56

## 2021-07-15 RX ADMIN — ATORVASTATIN CALCIUM 40 MILLIGRAM(S): 80 TABLET, FILM COATED ORAL at 21:47

## 2021-07-15 RX ADMIN — HEPARIN SODIUM 1800 UNIT(S)/HR: 5000 INJECTION INTRAVENOUS; SUBCUTANEOUS at 16:00

## 2021-07-15 RX ADMIN — Medication 81 MILLIGRAM(S): at 12:59

## 2021-07-15 RX ADMIN — CHLORHEXIDINE GLUCONATE 1 APPLICATION(S): 213 SOLUTION TOPICAL at 12:52

## 2021-07-15 RX ADMIN — Medication 1: at 18:45

## 2021-07-15 RX ADMIN — Medication 1: at 05:29

## 2021-07-15 RX ADMIN — Medication 30 MILLIGRAM(S): at 18:10

## 2021-07-15 RX ADMIN — Medication 30 MILLIGRAM(S): at 14:18

## 2021-07-15 RX ADMIN — CHLORHEXIDINE GLUCONATE 15 MILLILITER(S): 213 SOLUTION TOPICAL at 05:21

## 2021-07-15 RX ADMIN — Medication 1: at 12:43

## 2021-07-15 NOTE — PROGRESS NOTE ADULT - SUBJECTIVE AND OBJECTIVE BOX
INTERVAL HPI/OVERNIGHT EVENTS:    intubated on pressors in ICU   seen this morning getting bedside HD   no bm, no rectal bleeding    MEDICATIONS  (STANDING):  aspirin  chewable 81 milliGRAM(s) Oral daily  atorvastatin 40 milliGRAM(s) Oral at bedtime  chlorhexidine 0.12% Liquid 15 milliLiter(s) Oral Mucosa every 12 hours  chlorhexidine 2% Cloths 1 Application(s) Topical daily  dextrose 40% Gel 15 Gram(s) Oral once  dextrose 40% Gel 15 Gram(s) Oral once  dextrose 5%. 1000 milliLiter(s) (50 mL/Hr) IV Continuous <Continuous>  dextrose 5%. 1000 milliLiter(s) (100 mL/Hr) IV Continuous <Continuous>  dextrose 50% Injectable 25 Gram(s) IV Push once  dextrose 50% Injectable 12.5 Gram(s) IV Push once  dextrose 50% Injectable 25 Gram(s) IV Push once  diltiazem    Tablet 30 milliGRAM(s) Oral four times a day  epoetin danilo-epbx (RETACRIT) Injectable 76056 Unit(s) IV Push <User Schedule>  glucagon  Injectable 1 milliGRAM(s) IntraMuscular once  heparin  Infusion. 1000 Unit(s)/Hr (10 mL/Hr) IV Continuous <Continuous>  insulin lispro (ADMELOG) corrective regimen sliding scale   SubCutaneous every 6 hours  norepinephrine Infusion 0.05 MICROgram(s)/kG/Min (4.04 mL/Hr) IV Continuous <Continuous>  pantoprazole  Injectable 40 milliGRAM(s) IV Push every 12 hours  propofol Infusion 10 MICROgram(s)/kG/Min (5.17 mL/Hr) IV Continuous <Continuous>    MEDICATIONS  (PRN):  acetaminophen   Tablet .. 1000 milliGRAM(s) Oral every 6 hours PRN Temp greater or equal to 38C (100.4F), Mild Pain (1 - 3), Moderate Pain (4 - 6)  calamine/zinc oxide Lotion 1 Application(s) Topical three times a day PRN Itching      Allergies    lisinopril (Other)  opioid-like analgesics (Other)    Intolerances        Review of Systems: *intubated/sedated unobtainable        Vital Signs Last 24 Hrs  T(C): 36.1 (15 Jul 2021 12:00), Max: 37.8 (15 Jul 2021 00:00)  T(F): 97 (15 Jul 2021 12:00), Max: 100 (15 Jul 2021 00:00)  HR: 75 (15 Jul 2021 13:00) (60 - 82)  BP: 163/47 (15 Jul 2021 13:00) (111/49 - 183/54)  BP(mean): 72 (15 Jul 2021 13:00) (61 - 85)  RR: 20 (15 Jul 2021 13:00) (20 - 26)  SpO2: 100% (15 Jul 2021 13:00) (89% - 100%)    PHYSICAL EXAM:    Constitutional: NAD  HEENT: EOMI, throat clear  Neck: No LAD, supple  Respiratory: intubated   Cardiovascular: S1 and S2, RRR, no M  Gastrointestinal: BS+, soft, NT/ND, neg HSM,  Extremities: No peripheral edema, neg clubbing, cyanosis  Vascular: 2+ peripheral pulses  Neurological: A/O x0  Psychiatric: sedated/intubated  Skin: No rashes      LABS:                        8.2    10.98 )-----------( 162      ( 15 Jul 2021 04:24 )             26.9     07-15    135  |  96<L>  |  28<H>  ----------------------------<  173<H>  3.3<L>   |  20<L>  |  6.40<H>    Ca    7.7<L>      15 Jul 2021 04:24  Phos  2.8     07-14  Mg     2.00     07-15    TPro  4.6<L>  /  Alb  2.1<L>  /  TBili  0.4  /  DBili  x   /  AST  17  /  ALT  <5<L>  /  AlkPhos  89  07-15    PTT - ( 15 Jul 2021 12:54 )  PTT:29.8 sec      RADIOLOGY & ADDITIONAL TESTS:

## 2021-07-15 NOTE — PROGRESS NOTE ADULT - SUBJECTIVE AND OBJECTIVE BOX
Nephrology Followup Note - [phone] - Dr Mendiola / Dr Knight / Dr Macias / Dr Arroyo / Dr Mackey / Dr Bowen / Dr Esposito / Dr Calle  Pt seen and examined at bedside  No acute events overnight. Pt remains intubated and on pressor support.     Allergies:  lisinopril (Other)  opioid-like analgesics (Other)    Hospital Medications:   MEDICATIONS  (STANDING):  aspirin  chewable 81 milliGRAM(s) Oral daily  atorvastatin 40 milliGRAM(s) Oral at bedtime  chlorhexidine 0.12% Liquid 15 milliLiter(s) Oral Mucosa every 12 hours  chlorhexidine 2% Cloths 1 Application(s) Topical daily  dextrose 40% Gel 15 Gram(s) Oral once  dextrose 40% Gel 15 Gram(s) Oral once  dextrose 5%. 1000 milliLiter(s) (50 mL/Hr) IV Continuous <Continuous>  dextrose 5%. 1000 milliLiter(s) (100 mL/Hr) IV Continuous <Continuous>  dextrose 50% Injectable 25 Gram(s) IV Push once  dextrose 50% Injectable 12.5 Gram(s) IV Push once  dextrose 50% Injectable 25 Gram(s) IV Push once  diltiazem    Tablet 30 milliGRAM(s) Oral four times a day  epoetin danilo-epbx (RETACRIT) Injectable 61910 Unit(s) IV Push <User Schedule>  glucagon  Injectable 1 milliGRAM(s) IntraMuscular once  heparin  Infusion. 1000 Unit(s)/Hr (10 mL/Hr) IV Continuous <Continuous>  insulin lispro (ADMELOG) corrective regimen sliding scale   SubCutaneous every 6 hours  norepinephrine Infusion 0.05 MICROgram(s)/kG/Min (4.04 mL/Hr) IV Continuous <Continuous>  pantoprazole  Injectable 40 milliGRAM(s) IV Push every 12 hours  propofol Infusion 10 MICROgram(s)/kG/Min (5.17 mL/Hr) IV Continuous <Continuous>    VITALS:  T(F): 97.5 (07-15-21 @ 10:50), Max: 100 (07-15-21 @ 00:00)  HR: 70 (07-15-21 @ 10:50)  BP: 136/44 (07-15-21 @ 10:50)  RR: 24 (07-15-21 @ 10:50)  SpO2: 96% (07-15-21 @ 10:00)  Wt(kg): --    07-14 @ 07:01  -  07-15 @ 07:00  --------------------------------------------------------  IN: 1234.8 mL / OUT: 0 mL / NET: 1234.8 mL    07-15 @ 07:01  -  07-15 @ 11:12  --------------------------------------------------------  IN: 217.4 mL / OUT: 0 mL / NET: 217.4 mL        PHYSICAL EXAM:  Constitutional: NAD  HEENT: ETT  Neck: No JVD  Respiratory: CTAB, no wheezes, rales or rhonchi  Cardiovascular: S1, S2, RRR  Gastrointestinal: BS+, soft, NT/ND  Extremities: No cyanosis or clubbing. No peripheral edema  Neurological: no response  : No CVA tenderness. No wagner.   Skin: No rashes  Vascular Access: LUE AV access +thrill and bruit.     LABS:  07-15    135  |  96<L>  |  28<H>  ----------------------------<  173<H>  3.3<L>   |  20<L>  |  6.40<H>    Ca    7.7<L>      15 Jul 2021 04:24  Phos  2.8     07-14  Mg     2.00     07-15    TPro  4.6<L>  /  Alb  2.1<L>  /  TBili  0.4  /  DBili      /  AST  17  /  ALT  <5<L>  /  AlkPhos  89  07-15    Creatinine Trend: 6.40 <--, 5.18 <--, 6.40 <--, 6.05 <--, 8.49 <--, 8.56 <--, 7.83 <--, 5.72 <--, 6.42 <--, 6.10 <--                        8.2    10.98 )-----------( 162       15 Jul 2021 04:24 )             26.9     Urine Studies:      RADIOLOGY & ADDITIONAL STUDIES:

## 2021-07-15 NOTE — PROGRESS NOTE ADULT - ASSESSMENT
69 yo M with PMhx of CAD s/p CABG (course at that time complicated by pleural effusion requiring chest tubes), ESRD on HD (MWF), DMII, HTN, afib on coumadin, and anemia presented from [facility] for evaluation of chest pain and GIB. Renal following for ESRD Mx. s/p cardiac arrest. CTH showed acute infarcts.     ESRD  Maintenance schedule MWF  Pt last dialyzed on 7/13.  Will plan for repeat HD today, low UF goals, 0.5kg, as BP tolerates.   Improved hemodynamics, pressor titration as per ICU team  dose all meds for ESRD  await TRUMAN    Anemia in CKD+GIB- Hb <goal  c/w Epo 20k tiw with HD  monitor h/h, neg FOBT  GI following, No plans for cscope.     Rudy Mendiola MD  [facility]  Office [phone]  Ans Serv [phone]  [facility] [phone]

## 2021-07-15 NOTE — PROGRESS NOTE ADULT - SUBJECTIVE AND OBJECTIVE BOX
Follow Up:      Inverval History/ROS:Patient is a 70y old  Male who presents with a chief complaint of Chest pain and GIB (15 Jul 2021 13:43)    Intubated   Off pressors    Allergies    lisinopril (Other)  opioid-like analgesics (Other)    Intolerances        ANTIMICROBIALS:      OTHER MEDS:  acetaminophen   Tablet .. 1000 milliGRAM(s) Oral every 6 hours PRN  aspirin  chewable 81 milliGRAM(s) Oral daily  atorvastatin 40 milliGRAM(s) Oral at bedtime  calamine/zinc oxide Lotion 1 Application(s) Topical three times a day PRN  chlorhexidine 0.12% Liquid 15 milliLiter(s) Oral Mucosa every 12 hours  chlorhexidine 2% Cloths 1 Application(s) Topical daily  dextrose 40% Gel 15 Gram(s) Oral once  dextrose 40% Gel 15 Gram(s) Oral once  dextrose 5%. 1000 milliLiter(s) IV Continuous <Continuous>  dextrose 5%. 1000 milliLiter(s) IV Continuous <Continuous>  dextrose 50% Injectable 25 Gram(s) IV Push once  dextrose 50% Injectable 12.5 Gram(s) IV Push once  dextrose 50% Injectable 25 Gram(s) IV Push once  diltiazem    Tablet 30 milliGRAM(s) Oral four times a day  epoetin danilo-epbx (RETACRIT) Injectable 67011 Unit(s) IV Push <User Schedule>  glucagon  Injectable 1 milliGRAM(s) IntraMuscular once  heparin  Infusion. 1000 Unit(s)/Hr IV Continuous <Continuous>  insulin lispro (ADMELOG) corrective regimen sliding scale   SubCutaneous every 6 hours  norepinephrine Infusion 0.05 MICROgram(s)/kG/Min IV Continuous <Continuous>  pantoprazole  Injectable 40 milliGRAM(s) IV Push every 12 hours  propofol Infusion 10 MICROgram(s)/kG/Min IV Continuous <Continuous>      Vital Signs Last 24 Hrs  T(C): 36.1 (15 Jul 2021 12:00), Max: 37.8 (15 Jul 2021 00:00)  T(F): 97 (15 Jul 2021 12:00), Max: 100 (15 Jul 2021 00:00)  HR: 75 (15 Jul 2021 13:00) (60 - 82)  BP: 163/47 (15 Jul 2021 13:00) (111/49 - 183/54)  BP(mean): 72 (15 Jul 2021 13:00) (61 - 85)  RR: 20 (15 Jul 2021 13:00) (20 - 26)  SpO2: 100% (15 Jul 2021 13:00) (89% - 100%)    PHYSICAL EXAM:  General: [x ] intubated  HEAD/EYES: [ ] PERRL [x ] white sclera [ ] icterus  ENT:  [ ] normal [x ] supple [ ] thrush [ ] pharyngeal exudate  Cardiovascular:   [ ] murmur [x ] normal [ ] PPM/AICD  Respiratory:  [x ] clear to ausculation bilaterally  GI:  [ x] soft, non-tender, normal bowel sounds  :  [ ] wagner [ ] no CVA tenderness   Musculoskeletal:  [ ] no synovitis  Neurologic:  [ ] non-focal exam   Skin:  [x ] no rash  Lymph: [ x] no lymphadenopathy  Psychiatric:  [ ] appropriate affect [ ] alert & oriented  Lines:  [ x] no phlebitis [ ] central line                                8.2    10.98 )-----------( 162      ( 15 Jul 2021 04:24 )             26.9       07-15    135  |  96<L>  |  28<H>  ----------------------------<  173<H>  3.3<L>   |  20<L>  |  6.40<H>    Ca    7.7<L>      15 Jul 2021 04:24  Phos  2.8     07-14  Mg     2.00     07-15    TPro  4.6<L>  /  Alb  2.1<L>  /  TBili  0.4  /  DBili  x   /  AST  17  /  ALT  <5<L>  /  AlkPhos  89  07-15          MICROBIOLOGY:Culture Results:   No growth to date. (07-12-21 @ 06:12)  Culture Results:   No growth to date. (07-11-21 @ 23:16)  Culture Results:   Growth in aerobic and anaerobic bottles: Methicillin resistant  Staphylococcus aureus  See previous culture [id number] (07-09-21 @ 08:17)  Culture Results:   No Growth Final (07-09-21 @ 03:47)  Culture Results:   No growth at 5 days (07-08-21 @ 17:34)      RADIOLOGY:

## 2021-07-15 NOTE — PROGRESS NOTE ADULT - ASSESSMENT
70 year old male with possible GI bleed     Constipation   Would recommend starting bowel regimen   Senna qhs and titrate Miralax qd to bid as needed   Suppository vs Enema as needed     Anemia  negative occult w/o overt gi bleeding  Monitor CBC and Keep hemoglobin > 8 given cardiac issues   PPI BID w/Maalox PRN    No GI objection to ASA or Heparin gtt   Monitor stool color  No GI objection to proceeding w/TRUMAN (H/H stable, negative occult, brown stools)    Ascites  2/2 RHF w/dilated IVC on CT   diuresis per cardiology     Cardiac Arrest  Care per cardiology and MICU appreciated     Pleural Effusion:  care per CTSx appreciated     Advanced care planning was discussed with patient.  Advanced care planning forms were reviewed and discussed.  Risks, benefits and alternatives of gastroenterologic procedures were discussed in detail and all questions were answered.  30 minutes spent.

## 2021-07-15 NOTE — PROGRESS NOTE ADULT - ASSESSMENT
69 yo M with PMhx of CAD s/p CABG (course at that time complicated by pleural effusion requiring chest tubes), ESRD on HD (MWF), DMII, HTN, afib on coumadin, and anemia presented from [facility] for evaluation of chest pain and GIB.       left sided pleural effusion: loculated and chronic:   CAD:  CABG:  ESRD:   DM:  HTN:  A fibrillation:   GI Bleed    7/7:    left sided pleural effusion: loculated and chronic: he has chr loculated pleural effusion on left side: He had chest tube placement in last June 2020 at another hospital: no chemistry is available but he had negative cytology at that time: This time the effusion seems slightly worse and has loculated effusion with pleural thickening: heis not SOB andhasbeen on roomair: I think , it at all, if this effusion needs to be dealt with : it is probably vats: will contact thoracic surgery : etiology not very clear as there is no previous chemistry: coult be exudative or transudatve: he is on HD and now it is a chr christiano effusion   CAD: cont per cards  CABG: cont current meds  ESRD: on HD   DM: monitor and control  HTN: controlled  A fibrillation: off ac:   GI Bleed: per gi :  DW pt and t eam1    7/8:    left sided pleural effusion:: fever: s/p left sided chest tube placement: cultures sent: however with pr criteria it seems transudative await serum LDH but pleural fluid LDH is low:  already started on broad spectrum antibiotics ID to see: await cultures:   CAD: cont per cards  CABG: cont current meds  ESRD: on HD   DM: monitor and control  HTN: controlled  A fibrillation: off ac:   GI Bleed: per gi :  DW pmd    7/9:    left sided pleural effusion:: fever: s/p left sided chest tube placement: cultures sent: however with pr criteria it seems borderline transudative and transudative by LDH criteria: the vulutres ahve been negative so far:  already started on broad spectrum antibiotics ID to see: await cultures: ? source of spesis  CAD: cont per cards  CABG: cont current meds  ESRD: on HD   DM: monitor and control  HTN: in shock: now on vasopressors:   A fibrillation: off ac:   GI Bleed: per gi :  DW pmd  micu team    7/11:      left sided pleural effusion:: fever: s/p left sided chest tube placement: cultures sent: however with pr criteria it seems borderline transudative and transudative by LDH criteria: the cultures have been negative so far:  already started on broad spectrum antibiotics : Has MSSA bactermia  CAD: cont per cards: still on vasopressors: wean as tolerated ? needs molly ro tule out IE : would defer to cards:   CABG: cont current meds  ESRD: on HD   DM: monitor and control  HTN: in shock: now on vasopressors:   A fibrillation: off ac:   GI Bleed: per gi :  DW pmd  micu team    7/12:    left sided pleural effusion:: fever: s/p left sided chest tube placement:- now removed: cultures sent: however with pr criteria it seems borderline transudative and transudative by LDH criteria: the cultures have been negative so far:  already started on broad spectrum antibiotics : Has MRSA bactermia: vanco restarted yesterday ? echo/molly  Ac hypercarbic resp fialure: needs to be watched closelyin MICU : needs bipap may need intubation" At this time he is alert and awake: and responds to simple questions:    CAD: cont per cards: still on vasopressors: wean as tolerated ? needs molly ro tule out IE : would defer to cards:   CABG: cont current meds  ESRD: on HD   DM: monitor and control  HTN: in shock: now on vasopressors:   A fibrillation: off ac:   GI Bleed: per gi :  DW pmd  cards and bedstaff    7/13:  left sided pleural effusion:: fever: s/p left sided chest tube placement:- now removed: cultures sent: however with pr criteria it seems borderline transudative and transudative by LDH criteria: the cultures have been negative so far:  already started on broad spectrum antibiotics : Has MRSA bacteremia vanco restarted yesterday ? echo/molly  Ac hypercarbic resp fialure: now intubated s/p acls protocol last night   CAD: cont per cards: still on vasopressors: wean as tolerated ? needs molly ro tule out IE : would defer to cards:   CABG: cont current meds  ESRD: on HD   DM: monitor and control  HTN: in shock: now on vasopressors:   A fibrillation: off ac:   GI Bleed: per gi :  LINDA  staff  pt is critically ill now:     7/14:  left sided pleural effusion:: fever: s/p left sided chest tube placement:- now removed: cultures sent: however with pr criteria it seems borderline transudative and transudative by LDH criteria: the cultures have been negative so far:  already started on broad spectrum antibiotics : Has MRSA bacteremia vanco restarted yesterday now for  echo/molly  Ac hypercarbic resp fialure: now intubated s/p acls protocol last night -s/p cardiac arrest: ROSC 10 mts:   CAD: cont per cards: still on vasopressors: wean as tolerated ? needs molly ro tule out IE : would defer to cards: Cotn antbitioc  CABG: cont current meds  ESRD: on HD   DM: monitor and control  HTN: in shock: now on vasopressors:   A fibrillation: off ac:   GI Bleed: per gi :  DW  staff  pt is critically ill now: dw cards    7/15:    left sided pleural effusion:: transudative pl effusion with no with: certainly not the source for MRSA in blood:   Ac hypercarbic resp fialure: now intubated s/p acls protocol last night -s/p cardiac arrest: ROSC 10 mts:   CAD: cont per cards: still on vasopressors: wean as tolerated ? needs molly ro tule out IE : would defer to cards: Cotn antbitioc  CABG: cont current meds  ESRD: on HD   DM: monitor and control  HTN: in shock: now on vasopressors:   A fibrillation: on ac:   GI Bleed: per gi :  DW  staff  pt is critically ill now:

## 2021-07-15 NOTE — PROGRESS NOTE ADULT - ATTENDING COMMENTS
70 M with CABG, ESRD on HD, known history of L pleural effusion, afib on a/c here with chest pain and concern for GIB, now with septic shock due to MRSA bacteremia requiring pressors, c/b cardiac arrest after ?Gm/vagal episode with ROSC, now acute hypoxemic respiratory failure due to likely aspiration pneumonitis.    bilateral effusions, likely fluid overload and does not need drainage at this time.  Son did not want TRUMAN yesterday, spoke with cards this AM who spoke to son; he is now amenable for TRUMAN to r/o endocarditis  bcx negative thus far  HD today, will re-dose vanco after HD  h/h stable

## 2021-07-15 NOTE — PROGRESS NOTE ADULT - ASSESSMENT
70 year old with ESRD, DM, CAD presented with anemia and chest pain    Recently diagnosed Staph aureus bacteremia  Course complicated by arrest, imaging with ? CVA    1)Staph bacteremia- ? phlebitis as focus  -prelim results suggested MSSA  now identified as MRSA- did have vanco dose on 7/9; redosed 7/11    Dose Vancomycin based on daily level     Repeat blood cultures 7/9 with growth in 2 of 4 bottles  Repeat blood culture 7/11 and 7/12 without growth    Check TRUMAN  Consider imaging LS spine when more stable    LUE HD access appears to be a fistula but history states graft- consider US  to confirm    2) Fever  Likely due to bacteremia  Monitor    3) Pleural effusion  S/p thoracentesis- appears transudative but follow up Culture    4) Leukocytosis  Continue to monitor

## 2021-07-15 NOTE — PROGRESS NOTE ADULT - SUBJECTIVE AND OBJECTIVE BOX
CARDIOLOGY ATTENDING    tele - SR    S: remains intubated; ros limited     Review of Systems:   Constitutional: [ ] fevers, [ ] chills.   Skin: [ ] dry skin. [ ] rashes.  Psychiatric: [ ] depression, [ ] anxiety.   Gastrointestinal: [ ] BRBPR, [ ] melena.   Neurological: [ ] confusion. [ ] seizures. [ ] shuffling gait.   Ears,Nose,Mouth and Throat: [ ] ear pain [ ] sore throat.   Eyes: [ ] diplopia.   Respiratory: [ ] hemoptysis. [ ] shortness of breath  Cardiovascular: See HPI above  Hematologic/Lymphatic: [ ] anemia. [ ] painful nodes. [ ] prolonged bleeding.   Genitourinary: [ ] hematuria. [ ] flank pain.   Endocrine: [ ] significant change in weight. [ ] intolerance to heat and cold.     Review of systems [ ] otherwise negative, [x ] otherwise unable to obtain    FH: no family history of sudden cardiac death in first degree relatives    SH: [ ] tobacco, [ ] alcohol, [ ] drugs        acetaminophen   Tablet .. 1000 milliGRAM(s) Oral every 6 hours PRN  aspirin  chewable 81 milliGRAM(s) Oral daily  atorvastatin 40 milliGRAM(s) Oral at bedtime  calamine/zinc oxide Lotion 1 Application(s) Topical three times a day PRN  chlorhexidine 0.12% Liquid 15 milliLiter(s) Oral Mucosa every 12 hours  chlorhexidine 2% Cloths 1 Application(s) Topical daily  dextrose 40% Gel 15 Gram(s) Oral once  dextrose 40% Gel 15 Gram(s) Oral once  dextrose 5%. 1000 milliLiter(s) IV Continuous <Continuous>  dextrose 5%. 1000 milliLiter(s) IV Continuous <Continuous>  dextrose 50% Injectable 25 Gram(s) IV Push once  dextrose 50% Injectable 12.5 Gram(s) IV Push once  dextrose 50% Injectable 25 Gram(s) IV Push once  diltiazem    Tablet 30 milliGRAM(s) Oral four times a day  epoetin danilo-epbx (RETACRIT) Injectable 31317 Unit(s) IV Push <User Schedule>  glucagon  Injectable 1 milliGRAM(s) IntraMuscular once  heparin  Infusion. 1000 Unit(s)/Hr IV Continuous <Continuous>  insulin lispro (ADMELOG) corrective regimen sliding scale   SubCutaneous every 6 hours  norepinephrine Infusion 0.05 MICROgram(s)/kG/Min IV Continuous <Continuous>  pantoprazole  Injectable 40 milliGRAM(s) IV Push every 12 hours  propofol Infusion 10 MICROgram(s)/kG/Min IV Continuous <Continuous>                            8.2    10.98 )-----------( 162      ( 15 Jul 2021 04:24 )             26.9       07-15    135  |  96<L>  |  28<H>  ----------------------------<  173<H>  3.3<L>   |  20<L>  |  6.40<H>    Ca    7.7<L>      15 Jul 2021 04:24  Phos  2.8     07-14  Mg     2.00     07-15    TPro  4.6<L>  /  Alb  2.1<L>  /  TBili  0.4  /  DBili  x   /  AST  17  /  ALT  <5<L>  /  AlkPhos  89  07-15      CARDIAC MARKERS ( 14 Jul 2021 03:59 )  x     / x     / 29 U/L / x     / x      CARDIAC MARKERS ( 13 Jul 2021 02:50 )  x     / x     / 50 U/L / x     / 7.1 ng/mL        T(C): 36.1 (07-15-21 @ 14:33), Max: 37.8 (07-15-21 @ 00:00)  HR: 76 (07-15-21 @ 15:32) (60 - 82)  BP: 157/42 (07-15-21 @ 15:00) (111/49 - 177/43)  RR: 24 (07-15-21 @ 15:00) (20 - 26)  SpO2: 100% (07-15-21 @ 15:32) (89% - 100%)  Wt(kg): --    I&O's Summary    14 Jul 2021 07:01  -  15 Jul 2021 07:00  --------------------------------------------------------  IN: 1234.8 mL / OUT: 0 mL / NET: 1234.8 mL    15 Jul 2021 07:01  -  15 Jul 2021 15:48  --------------------------------------------------------  IN: 733.4 mL / OUT: 900 mL / NET: -166.6 mL          General: intubated; sedated   Head: Normocephalic and atraumatic.   Neck: No JVD. No bruits. Supple. Does not appear to be enlarged.   Cardiovascular: + S1,S2 ; RRR Soft systolic murmur at the left lower sternal border. No rubs noted.    Lungs: decreased BS BL  Abdomen: + BS, soft. Non tender. Non distended. No rebound. No guarding.   Extremities: No clubbing/cyanosis/edema.   Neurologic: unable to assess  Skin: Warm and moist. The patient's skin has normal elasticity and good skin turgor.   Psychiatric: unable to assess   Musculoskeletal: unable to assess    DATA  < from: Transthoracic Echocardiogram (07.07.21 @ 18:17) >  ------------------------------------------------------------------------  CONCLUSIONS:  1. Mitral annular calcification, otherwise normal mitral  valve. Minimal mitral regurgitation.  2. Endocardium not well visualized; grossly normal left  ventricular systolic function.  3. Right ventricular enlargement with decreased right  ventricular systolic function.  4. Inadequate tricuspid regurgitation Doppler envelope  precludes estimation of RVSP.  ------------------------------------------------------------------------  Confirmed on  7/7/2021 - 21:16:20 by Osvaldo Bertrand M.D.,  [facility], [facility]    < end of copied text >        A/P) 71 y/o male PMH CAD s/p CABG and old PCI, AFL x 1 year on coumadin, HTN, hyperlipidemia, DM, ESRD on HD, and hypothyroidism originally admitted to [facility] with GIB, transferred to [facility], found to have septic shock and MRSA bacteremia.     -Events noted; pt. s/p PEA arrest   -Repeat TTE with only mild segmental LV dysfunction in the inferoseptum  -Given negative CPK, do not suspect acs  -Suspect elevated troponin in the setting of demand ischemia from underlying septic shock  -Pressor support per MICU  -Follow up EP  -Given MRSA bacteremia, check TRUMAN to rule out endocarditis if family agreeable - appreciate GI eval - cleared by GI for TRUMAN    -Abx per ID  -CTH head noted - pt. with cva - ac was initially held due to GIB and acute blood loss anemia at [facility] resulting in a 6 point drop in Hb - appreciate GI eval - cleared by GI to resume AC  -Neuro and MRI follow up   -Supportive care per MICU     Kalie Lau MD

## 2021-07-15 NOTE — PROGRESS NOTE ADULT - SUBJECTIVE AND OBJECTIVE BOX
Date of Service: 07-15-21 @ 15:40    Patient is a 70y old  Male who presents with a chief complaint of Chest pain and GIB (15 Jul 2021 13:48)      Any change in ROS: doing same: intubated and sedated: on pressos:     MEDICATIONS  (STANDING):  aspirin  chewable 81 milliGRAM(s) Oral daily  atorvastatin 40 milliGRAM(s) Oral at bedtime  chlorhexidine 0.12% Liquid 15 milliLiter(s) Oral Mucosa every 12 hours  chlorhexidine 2% Cloths 1 Application(s) Topical daily  dextrose 40% Gel 15 Gram(s) Oral once  dextrose 40% Gel 15 Gram(s) Oral once  dextrose 5%. 1000 milliLiter(s) (50 mL/Hr) IV Continuous <Continuous>  dextrose 5%. 1000 milliLiter(s) (100 mL/Hr) IV Continuous <Continuous>  dextrose 50% Injectable 25 Gram(s) IV Push once  dextrose 50% Injectable 12.5 Gram(s) IV Push once  dextrose 50% Injectable 25 Gram(s) IV Push once  diltiazem    Tablet 30 milliGRAM(s) Oral four times a day  epoetin danilo-epbx (RETACRIT) Injectable 80285 Unit(s) IV Push <User Schedule>  glucagon  Injectable 1 milliGRAM(s) IntraMuscular once  heparin  Infusion. 1000 Unit(s)/Hr (10 mL/Hr) IV Continuous <Continuous>  insulin lispro (ADMELOG) corrective regimen sliding scale   SubCutaneous every 6 hours  norepinephrine Infusion 0.05 MICROgram(s)/kG/Min (4.04 mL/Hr) IV Continuous <Continuous>  pantoprazole  Injectable 40 milliGRAM(s) IV Push every 12 hours  propofol Infusion 10 MICROgram(s)/kG/Min (5.17 mL/Hr) IV Continuous <Continuous>    MEDICATIONS  (PRN):  acetaminophen   Tablet .. 1000 milliGRAM(s) Oral every 6 hours PRN Temp greater or equal to 38C (100.4F), Mild Pain (1 - 3), Moderate Pain (4 - 6)  calamine/zinc oxide Lotion 1 Application(s) Topical three times a day PRN Itching    Vital Signs Last 24 Hrs  T(C): 36.1 (15 Jul 2021 14:33), Max: 37.8 (15 Jul 2021 00:00)  T(F): 97 (15 Jul 2021 14:33), Max: 100 (15 Jul 2021 00:00)  HR: 76 (15 Jul 2021 15:32) (60 - 82)  BP: 157/42 (15 Jul 2021 15:00) (111/49 - 177/43)  BP(mean): 69 (15 Jul 2021 15:00) (61 - 85)  RR: 24 (15 Jul 2021 15:00) (20 - 26)  SpO2: 100% (15 Jul 2021 15:32) (89% - 100%)  Mode: AC/ CMV (Assist Control/ Continuous Mandatory Ventilation)  RR (machine): 24  TV (machine): 450  FiO2: 40  PEEP: 5  ITime: 0.86  MAP: 13  PIP: 28    I&O's Summary    14 Jul 2021 07:01  -  15 Jul 2021 07:00  --------------------------------------------------------  IN: 1234.8 mL / OUT: 0 mL / NET: 1234.8 mL    15 Jul 2021 07:01  -  15 Jul 2021 15:40  --------------------------------------------------------  IN: 733.4 mL / OUT: 900 mL / NET: -166.6 mL          Physical Exam:   GENERAL: NAD, well-groomed, well-developed  HEENT: KATHY/   Atraumatic, Normocephalic  ENMT: No tonsillar erythema, exudates, or enlargement; Moist mucous membranes, Good dentition, No lesions  NECK: Supple, No JVD, Normal thyroid  CHEST/LUNG: Clear to auscultaion  CVS: Regular rate and rhythm; No murmurs, rubs, or gallops  GI: : Soft, Nontender, Nondistended; Bowel sounds present  NERVOUS SYSTEM:  intuabted and sedated  EXTREMITIES:  + edema  LYMPH: No lymphadenopathy noted  SKIN: No rashes or lesions  ENDOCRINOLOGY: No Thyromegaly  PSYCH:sedated    Labs:  22  CARDIAC MARKERS ( 14 Jul 2021 03:59 )  x     / x     / 29 U/L / x     / x                                8.2    10.98 )-----------( 162      ( 15 Jul 2021 04:24 )             26.9                         9.0    14.22 )-----------( 226      ( 14 Jul 2021 03:59 )             29.2                         9.1    13.95 )-----------( 220      ( 13 Jul 2021 02:50 )             30.6                         8.9    13.60 )-----------( 234      ( 12 Jul 2021 21:14 )             29.7                         9.0    13.27 )-----------( 218      ( 12 Jul 2021 08:31 )             29.8                         9.3    18.00 )-----------( 255      ( 12 Jul 2021 01:47 )             31.8                         9.1    15.61 )-----------( 221      ( 11 Jul 2021 18:38 )             30.2     07-15    135  |  96<L>  |  28<H>  ----------------------------<  173<H>  3.3<L>   |  20<L>  |  6.40<H>  07-14    132<L>  |  92<L>  |  20  ----------------------------<  159<H>  3.5   |  18<L>  |  5.18<H>  07-13    131<L>  |  92<L>  |  24<H>  ----------------------------<  201<H>  3.6   |  18<L>  |  6.40<H>  07-12    130<L>  |  92<L>  |  23  ----------------------------<  183<H>  4.2   |  21<L>  |  6.05<H>  07-12    130<L>  |  90<L>  |  33<H>  ----------------------------<  207<H>  5.0   |  19<L>  |  8.49<H>  07-11    125<L>  |  91<L>  |  32<H>  ----------------------------<  235<H>  4.7   |  20<L>  |  8.56<H>    Ca    7.7<L>      15 Jul 2021 04:24  Ca    8.1<L>      14 Jul 2021 03:59  Phos  2.8     07-14  Mg     2.00     07-15  Mg     2.00     07-14    TPro  4.6<L>  /  Alb  2.1<L>  /  TBili  0.4  /  DBili  x   /  AST  17  /  ALT  <5<L>  /  AlkPhos  89  07-15  TPro  4.7<L>  /  Alb  2.5<L>  /  TBili  0.5  /  DBili  x   /  AST  21  /  ALT  <5<L>  /  AlkPhos  82  07-14  TPro  5.7<L>  /  Alb  3.0<L>  /  TBili  0.5  /  DBili  x   /  AST  24  /  ALT  5   /  AlkPhos  92  07-13  TPro  5.7<L>  /  Alb  3.0<L>  /  TBili  0.4  /  DBili  x   /  AST  24  /  ALT  5   /  AlkPhos  93  07-12  TPro  5.7<L>  /  Alb  3.0<L>  /  TBili  0.4  /  DBili  x   /  AST  27  /  ALT  8   /  AlkPhos  90  07-12  TPro  5.6<L>  /  Alb  3.0<L>  /  TBili  0.4  /  DBili  x   /  AST  25  /  ALT  9   /  AlkPhos  88  07-11    CAPILLARY BLOOD GLUCOSE      POCT Blood Glucose.: 171 mg/dL (15 Jul 2021 12:00)  POCT Blood Glucose.: 181 mg/dL (15 Jul 2021 05:28)  POCT Blood Glucose.: 186 mg/dL (14 Jul 2021 23:45)  POCT Blood Glucose.: 184 mg/dL (14 Jul 2021 17:03)     (07-08 @ 12:20)    LIVER FUNCTIONS - ( 15 Jul 2021 04:24 )  Alb: 2.1 g/dL / Pro: 4.6 g/dL / ALK PHOS: 89 U/L / ALT: <5 U/L / AST: 17 U/L / GGT: x           PTT - ( 15 Jul 2021 12:54 )  PTT:29.8 sec    Procalcitonin, Serum: 3.32 ng/mL (07-12 @ 01:49)        RECENT CULTURES:  07-12 @ 06:12 .Blood Blood                No growth to date.    07-11 @ 23:16 .Blood Blood-Peripheral                No growth to date.    07-09 @ 08:17 .Blood Blood-Peripheral       Growth in aerobic and anaerobic bottles: Gram Positive Cocci in Clusters           Growth in aerobic and anaerobic bottles: Methicillin resistant  Staphylococcus aureus  See previous culture [id number]    07-09 @ 03:47 .Blood Blood       r< from: CT Abdomen and Pelvis w/ IV Cont (07.13.21 @ 18:37) >  on of mild dilatation of IVC and hepatic veins..  BILE DUCTS: Normal caliber.  GALLBLADDER: Within normal limits.  SPLEEN: Within normal limits.  PANCREAS: Within normal limits.  ADRENALS: Within normal limits.  KIDNEYS/URETERS: Atrophic bilateral kidneys. No hydronephrosis. Exophytic 1.3 cm cyst arising from the left kidney.    BLADDER: Decompressed.  REPRODUCTIVE ORGANS: Prostate within normal limits. Bilateral hydroceles.    BOWEL: No bowel obstruction. Appendix is normal. Enteric tube is present with the tip in stomach.  PERITONEUM: Mild to moderate ascites in the perihepatic and perisplenic regions, tracking along the paracolic gutters. Trace ascites in the pelvis.  VESSELS: Atherosclerotic changes.  RETROPERITONEUM/LYMPH NODES: No lymphadenopathy.  ABDOMINAL WALL: Diffuse subcutaneous edema.  BONES: Degenerative changes.    IMPRESSION:  *  Mild pulmonary edema with bilateral partially loculated pleural effusions with adjacent passive atelectasis.  *  Debris within the right mainstem with associated partial atelectasis of the right lower lobe.  *  Mild to moderate ascites.    --- End of Report ---            TRU WEBER MD; Resident Radiology  This document has been electronically signed.    < end of copied text >  < from: CT Chest w/ IV Cont (07.13.21 @ 18:36) >  CHEST WALL AND LOWER NECK: Enlarged thyroid gland with multiple hypodense nodules bilaterally, the right containing calcifications, appear unchanged. Median sternotomy.  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  BILE DUCTS: Normal caliber.  GALLBLADDER: Within normal limits.  SPLEEN: Within normal limits.  PANCREAS: Within normal limits.  ADRENALS: Within normal limits.  KIDNEYS/URETERS: Atrophic bilateral kidneys. No hydronephrosis. Exophytic 1.3 cm cyst arising from the left kidney.    BLADDER: Decompressed.  REPRODUCTIVE ORGANS: Prostate within normal limits. Bilateral hydroceles.    BOWEL: No bowel obstruction. Appendix is normal. Enteric tube is present with the tip in stomach.  PERITONEUM: Mild to moderate ascites in the perihepatic and perisplenic regions, tracking along the paracolic gutters. Trace ascites in the pelvis.  VESSELS: Atherosclerotic changes.  RETROPERITONEUM/LYMPH NODES: No lymphadenopathy.  ABDOMINAL WALL: Diffuse subcutaneous edema.  BONES: Degenerative changes.    IMPRESSION:  *  Mild pulmonary edema with bilateral partially loculated pleural effusions with adjacent passive atelectasis.  *  Debris within the right mainstem with associated partial atelectasis of the right lower lobe.  *  Mild to moderate ascites.    --- End of Report ---            TRU WEBER MD; Resident Radiology  This document has been electronically signed.  MIKE NOLAN MD; Attending Radiologist  This document has been electronically signed. Jul 14 2021  9:42AM    < end of copied text >           No Growth Final    07-08 @ 17:34 .Body Fluid pleural fluid       polymorphonuclear leukocytes seen  No organisms seen  by cytocentrifuge           No growth at 5 days          RESPIRATORY CULTURES:          Studies  Chest X-RAY  CT SCAN Chest   Venous Dopplers: LE:   CT Abdomen  Others

## 2021-07-15 NOTE — PROGRESS NOTE ADULT - SUBJECTIVE AND OBJECTIVE BOX
Mainor Bowers  PGY-1 | Internal Medicine      INTERVAL HPI/OVERNIGHT EVENTS:    SUBJECTIVE: Patient seen and examined at bedside.     CONSTITUTIONAL: No weakness, fevers or chills  EYES/ENT: No visual changes;  No vertigo or throat pain   NECK: No pain or stiffness  RESPIRATORY: No cough, wheezing, hemoptysis; No shortness of breath  CARDIOVASCULAR: No chest pain or palpitations  GASTROINTESTINAL: No abdominal or epigastric pain. No nausea, vomiting, or hematemesis; No diarrhea or constipation. No melena or hematochezia.  GENITOURINARY: No dysuria, frequency or hematuria  NEUROLOGICAL: No numbness or weakness  SKIN: No itching, rashes    OBJECTIVE:    VITAL SIGNS:  ICU Vital Signs Last 24 Hrs  T(C): 37.7 (15 Jul 2021 04:00), Max: 37.8 (15 Jul 2021 00:00)  T(F): 99.8 (15 Jul 2021 04:00), Max: 100 (15 Jul 2021 00:00)  HR: 68 (15 Jul 2021 06:00) (59 - 82)  BP: 129/45 (15 Jul 2021 06:00) (100/36 - 183/54)  BP(mean): 64 (15 Jul 2021 06:00) (50 - 82)  ABP: --  ABP(mean): --  RR: 24 (15 Jul 2021 06:00) (24 - 26)  SpO2: 99% (15 Jul 2021 06:00) (95% - 100%)    Mode: AC/ CMV (Assist Control/ Continuous Mandatory Ventilation), RR (machine): 24, TV (machine): 450, FiO2: 50, PEEP: 5, ITime: 0.87, MAP: 13, PIP: 31    07-13 @ 07:01 - 07-14 @ 07:00  --------------------------------------------------------  IN: 2025.2 mL / OUT: 400 mL / NET: 1625.2 mL    07-14 @ 07:01 - 07-15 @ 06:55  --------------------------------------------------------  IN: 1188.3 mL / OUT: 0 mL / NET: 1188.3 mL      CAPILLARY BLOOD GLUCOSE      POCT Blood Glucose.: 181 mg/dL (15 Jul 2021 05:28)      PHYSICAL EXAM:    General: NAD  HEENT: NC/AT; PERRL, clear conjunctiva  Neck: supple  Respiratory: CTA b/l  Cardiovascular: +S1/S2; RRR  Abdomen: soft, NT/ND; +BS x4  Extremities: WWP, 2+ peripheral pulses b/l; no LE edema  Skin: normal color and turgor; no rash  Neurological:    MEDICATIONS:  MEDICATIONS  (STANDING):  aspirin  chewable 81 milliGRAM(s) Oral daily  atorvastatin 40 milliGRAM(s) Oral at bedtime  chlorhexidine 0.12% Liquid 15 milliLiter(s) Oral Mucosa every 12 hours  chlorhexidine 2% Cloths 1 Application(s) Topical daily  dextrose 40% Gel 15 Gram(s) Oral once  dextrose 40% Gel 15 Gram(s) Oral once  dextrose 5%. 1000 milliLiter(s) (50 mL/Hr) IV Continuous <Continuous>  dextrose 5%. 1000 milliLiter(s) (100 mL/Hr) IV Continuous <Continuous>  dextrose 50% Injectable 25 Gram(s) IV Push once  dextrose 50% Injectable 12.5 Gram(s) IV Push once  dextrose 50% Injectable 25 Gram(s) IV Push once  diltiazem    Tablet 30 milliGRAM(s) Oral four times a day  epoetin danilo-epbx (RETACRIT) Injectable 97265 Unit(s) IV Push <User Schedule>  glucagon  Injectable 1 milliGRAM(s) IntraMuscular once  heparin  Infusion. 1000 Unit(s)/Hr (10 mL/Hr) IV Continuous <Continuous>  insulin lispro (ADMELOG) corrective regimen sliding scale   SubCutaneous every 6 hours  norepinephrine Infusion 0.05 MICROgram(s)/kG/Min (4.04 mL/Hr) IV Continuous <Continuous>  pantoprazole  Injectable 40 milliGRAM(s) IV Push every 12 hours  potassium chloride   Solution 40 milliEquivalent(s) Oral once  potassium chloride  10 mEq/100 mL IVPB 10 milliEquivalent(s) IV Intermittent every 1 hour  propofol Infusion 10 MICROgram(s)/kG/Min (5.17 mL/Hr) IV Continuous <Continuous>    MEDICATIONS  (PRN):  acetaminophen   Tablet .. 1000 milliGRAM(s) Oral every 6 hours PRN Temp greater or equal to 38C (100.4F), Mild Pain (1 - 3), Moderate Pain (4 - 6)  calamine/zinc oxide Lotion 1 Application(s) Topical three times a day PRN Itching      ALLERGIES:  Allergies    lisinopril (Other)  opioid-like analgesics (Other)    Intolerances        LABS:                        8.2    10.98 )-----------( 162      ( 15 Jul 2021 04:24 )             26.9     07-15    135  |  96<L>  |  28<H>  ----------------------------<  173<H>  3.3<L>   |  20<L>  |  6.40<H>    Ca    7.7<L>      15 Jul 2021 04:24  Phos  2.8     07-14  Mg     2.00     07-15    TPro  4.6<L>  /  Alb  2.1<L>  /  TBili  0.4  /  DBili  x   /  AST  17  /  ALT  <5<L>  /  AlkPhos  89  07-15    PTT - ( 15 Jul 2021 04:24 )  PTT:29.1 sec      RADIOLOGY & ADDITIONAL TESTS: Reviewed. Mainor Bowers  PGY-1 | Internal Medicine      INTERVAL HPI/OVERNIGHT EVENTS: No acute events O/N.    SUBJECTIVE: Patient seen and examined at bedside. Patient is intubated and sedated    ROS: Unable to assess    OBJECTIVE:    VITAL SIGNS:  ICU Vital Signs Last 24 Hrs  T(C): 37.7 (15 Jul 2021 04:00), Max: 37.8 (15 Jul 2021 00:00)  T(F): 99.8 (15 Jul 2021 04:00), Max: 100 (15 Jul 2021 00:00)  HR: 68 (15 Jul 2021 06:00) (59 - 82)  BP: 129/45 (15 Jul 2021 06:00) (100/36 - 183/54)  BP(mean): 64 (15 Jul 2021 06:00) (50 - 82)  RR: 24 (15 Jul 2021 06:00) (24 - 26)  SpO2: 99% (15 Jul 2021 06:00) (95% - 100%)    Mode: AC/ CMV (Assist Control/ Continuous Mandatory Ventilation), RR (machine): 24, TV (machine): 450, FiO2: 50, PEEP: 5, ITime: 0.87, MAP: 13, PIP: 31    07-13 @ 07:01 - 07-14 @ 07:00  --------------------------------------------------------  IN: 2025.2 mL / OUT: 400 mL / NET: 1625.2 mL    07-14 @ 07:01  -  07-15 @ 06:55  --------------------------------------------------------  IN: 1188.3 mL / OUT: 0 mL / NET: 1188.3 mL      CAPILLARY BLOOD GLUCOSE      POCT Blood Glucose.: 181 mg/dL (15 Jul 2021 05:28)      PHYSICAL EXAM:    General: NAD.  HEENT: NC/AT; PERRL, clear conjunctiva  Neck: supple  Respiratory: CTA b/l  Cardiovascular: +S1/S2; RRR  Abdomen: soft, NT/ND; +BS x4  Extremities: WWP, 2+ peripheral pulses b/l; no LE edema  Skin: normal color and turgor; no rash  Neurological: Patient is intubated and sedated    MEDICATIONS:  MEDICATIONS  (STANDING):  aspirin  chewable 81 milliGRAM(s) Oral daily  atorvastatin 40 milliGRAM(s) Oral at bedtime  chlorhexidine 0.12% Liquid 15 milliLiter(s) Oral Mucosa every 12 hours  chlorhexidine 2% Cloths 1 Application(s) Topical daily  dextrose 40% Gel 15 Gram(s) Oral once  dextrose 40% Gel 15 Gram(s) Oral once  dextrose 5%. 1000 milliLiter(s) (50 mL/Hr) IV Continuous <Continuous>  dextrose 5%. 1000 milliLiter(s) (100 mL/Hr) IV Continuous <Continuous>  dextrose 50% Injectable 25 Gram(s) IV Push once  dextrose 50% Injectable 12.5 Gram(s) IV Push once  dextrose 50% Injectable 25 Gram(s) IV Push once  diltiazem    Tablet 30 milliGRAM(s) Oral four times a day  epoetin danilo-epbx (RETACRIT) Injectable 05181 Unit(s) IV Push <User Schedule>  glucagon  Injectable 1 milliGRAM(s) IntraMuscular once  heparin  Infusion. 1000 Unit(s)/Hr (10 mL/Hr) IV Continuous <Continuous>  insulin lispro (ADMELOG) corrective regimen sliding scale   SubCutaneous every 6 hours  norepinephrine Infusion 0.05 MICROgram(s)/kG/Min (4.04 mL/Hr) IV Continuous <Continuous>  pantoprazole  Injectable 40 milliGRAM(s) IV Push every 12 hours  potassium chloride   Solution 40 milliEquivalent(s) Oral once  potassium chloride  10 mEq/100 mL IVPB 10 milliEquivalent(s) IV Intermittent every 1 hour  propofol Infusion 10 MICROgram(s)/kG/Min (5.17 mL/Hr) IV Continuous <Continuous>    MEDICATIONS  (PRN):  acetaminophen   Tablet .. 1000 milliGRAM(s) Oral every 6 hours PRN Temp greater or equal to 38C (100.4F), Mild Pain (1 - 3), Moderate Pain (4 - 6)  calamine/zinc oxide Lotion 1 Application(s) Topical three times a day PRN Itching      ALLERGIES:  Allergies    lisinopril (Other)  opioid-like analgesics (Other)    Intolerances        LABS:                        8.2    10.98 )-----------( 162      ( 15 Jul 2021 04:24 )             26.9     07-15    135  |  96<L>  |  28<H>  ----------------------------<  173<H>  3.3<L>   |  20<L>  |  6.40<H>    Ca    7.7<L>      15 Jul 2021 04:24  Phos  2.8     07-14  Mg     2.00     07-15    TPro  4.6<L>  /  Alb  2.1<L>  /  TBili  0.4  /  DBili  x   /  AST  17  /  ALT  <5<L>  /  AlkPhos  89  07-15    PTT - ( 15 Jul 2021 04:24 )  PTT:29.1 sec      RADIOLOGY & ADDITIONAL TESTS: Reviewed.

## 2021-07-15 NOTE — PROGRESS NOTE ADULT - ASSESSMENT
71 yo M with PMhx of CAD s/p CABG (course at that time complicated by pleural effusion requiring chest tubes), ESRD on HD (MWF), DMII, HTN, afib on coumadin, and anemia transferred from [facility] for evaluation of chest pain after HD and GIB s/p 2U PRBC transfusion and aflutter/afib with RVR managed with metoprolol. Admitted to MICU for hypotension dependent on pressors likely 2/2 to sepsis.    #Neuro  - patient sedated on propofol and ketamine. Wean off sedation to better evaluate mental status.  - CT head shows foci of low density in the R anterolateral frontal lobe and L posterior temporal lobe concerning for acute infarcts.  - Mechanism likely cardioembolic in setting of patient with known atrial fibrillation with cardiac arrest.  - per neurology, can consider MRI brain w/o contrast. However, unlikely to  since patient has known atrial fibrillation and will need to be continued of full dose anticoagulation.  - Avoid hypotension, keep -180 for neuroprotection  - c/w Atorvastatin 40MG QHS for secondary stroke prevention.    #Cardiovascular  - Cardiac arrest: Patient had asystole night of 7/13 and CPR was started. S/p epi x3 and shock x2 with return to NSR. Cards is following  - TTE on 7/13 showed Mild mitral regurgitation with biventricular dysfunction of LV and RV systolic function. Endocardium was not well visualized, Hypokinesis of the basal inferior wall and basal inferoseptum.  - TRUMAN was scheduled for today to better evaluate the structural and valvular abnormalities. However, the son does not want any procedures to be done until the MRI is done and neurology speaks to the patient. Per neuro, MRI of the brain will not change care at this time. No TRUMAN for today  - Hypotension: MICU was consulted one day prior to transfer for hypotension responsive to Midodrine and 500cc IV bolus. Day of transfer, he presented with bradycardia and worsening hypotension after placement of chest tube. He was given atropine, Chris and Levophed.  - c/w Levophed wean as tolerated.   - BP goal 140-180 to maintain cerebral perfusion after stroke  - Will consider switching to midodrine once HR stable  - Troponins 1805, CKMB 7.1. Troponin stable from prior to cardiac arrest. They are likely 2/2 chronic condition/ESRD on HD vs Hypotensive event. Will continue to trend and monitor.    A. Fib  - on coumadin at home,  - c/ heparin gtt prophylaxis  - from neurology stand point, heparin is sufficient for now. Will need to be switched to DOAC in the future if kidney function allows  - 7/11: tried to transition heparin gtt to eliquis but pt is not a candidate due to his acute MIGUEL; back on heparin gtt   - c/ diltiazem for rate control    #Respiratory  - Loculated Pleural effusions: chest tube removed   - pleural fluid cx: (-); transudative.   - currently saturating adequately on NC @ 6L. Wean as tolerated.  - CXR s/p chest tube removal showed no evidence of atelectasis. Demonstrated loculated left pleural effusion decreased in size and new hazy right lower lung opacity which could be due to a small layering right pleural effusion with associated passive atelectasis, atelectasis of other cause, and/or pneumonia.  - f/u CT chest/abd/pelvis  - abx as below      #GI/Nutrition:  - Diet: Renal diet    - Was C/o abdominal pain, bedside POCUS didn't show ascites. No abdominal pain at this time. Continue to monitor.   - negative occult bleeding, hgb stable  - Per GI, patient is stable to get TRUMAN and does not need endoscopic evaluation prior to TRUMAN.   -Zofran PRN for Nausea    #/Renal  - ESRD: HD on MWF. Most recent Scr 6.40. Potassium is stable.   - Patient received dialysis after cardiac arrest for acidemia on 7/13.  - Spoke with nephro regarding dialysis session today in setting of contrast load yesterday. Awaiting recs.   - c/w Epo 20k tiw with HD      #Skin  - HD Access: fistula in left upper arm  - Improving erythematous and tender on right wrist. Continue to monitor. RUE US neg for thrombus      #ID  - S aureus PCR: (+),  MRSA PCR negative  - BCx on 7/7 now growing MRSA: restarted on vanc (7/11- )  - Vanc level on 7/13 was 24.8. f/u level today  - Will give vanco dose depending on HD today or not  - Surveillance blood Cx have been negative for 48hours, no longer need to continue    #Endocrine  - DMII: c/w Lispro SS. Monitor glucose.      #Hematologic/DVT ppx  - Heparin gtt      #Ethics  - full code as per wife- full code as per wife       71 yo M with PMhx of CAD s/p CABG (course at that time complicated by pleural effusion requiring chest tubes), ESRD on HD (MWF), DMII, HTN, afib on coumadin, and anemia transferred from [facility] for evaluation of chest pain after HD and GIB s/p 2U PRBC transfusion and aflutter/afib with RVR managed with metoprolol. Admitted to MICU for hypotension dependent on pressors likely 2/2 to sepsis.    #Neuro  - Being weaned off sedation to better evaluate mental status.  - CT head shows foci of low density in the R anterolateral frontal lobe and L posterior temporal lobe concerning for acute infarcts.  - Mechanism likely cardioembolic in setting of patient with known atrial fibrillation with cardiac arrest vs septic emboli  - per neurology, can consider MRI brain w/o contrast. However, unlikely to  since patient has known atrial fibrillation and will need to be continued of full dose anticoagulation.  - Avoid hypotension, keep -180 for neuroprotection  - c/w Atorvastatin 40MG QHS for secondary stroke prevention.    #Cardiovascular  - Cardiac arrest: Patient had asystole night of 7/13 and CPR was started. S/p epi x3 and shock x2 with return to NSR. Cards is following  - TTE on 7/13 showed Mild mitral regurgitation with biventricular dysfunction of LV and RV systolic function. Endocardium was not well visualized, Hypokinesis of the basal inferior wall and basal inferoseptum.  - Spoke with patient's son and he is okay with TRUMAN for his dad  - c/w Levophed wean as tolerated.   - BP goal 140-180 to maintain cerebral perfusion after stroke  - Will consider switching to midodrine once HR stable  - Troponins 2400, CKMB 7.1. Troponin stable from prior to cardiac arrest. They are likely 2/2 chronic condition/ESRD on HD vs Hypotensive event. Will continue to trend and monitor. The recent increase in trend likely due to lack of clearance 2/2 renal clearance    A. Fib  - on coumadin at home,  - c/ heparin gtt prophylaxis  - from neurology stand point, heparin is sufficient for now. Will need to be switched to DOAC in the future if kidney function allows  - 7/11: tried to transition heparin gtt to eliquis but pt is not a candidate due to his acute MIGUEL; back on heparin gtt   - c/ diltiazem for rate control    #Respiratory  - Loculated Pleural effusions: chest tube removed   - pleural fluid cx: (-); transudative.   - intubated and on ventilator (24/450/5/40). F/u ABG and adjust as necessary  - CXR s/p chest tube removal showed no evidence of atelectasis. Demonstrated loculated left pleural effusion decreased in size and new hazy right lower lung opacity which could be due to a small layering right pleural effusion with associated passive atelectasis, atelectasis of other cause, and/or pneumonia.  - abx as below      #GI/Nutrition:  - NPO with tube feed. Will make patient NPO after midnight   - Was C/o abdominal pain, bedside POCUS didn't show ascites. No abdominal pain at this time. Continue to monitor.   - negative occult bleeding, hgb stable  - Per GI, patient is stable to get TRUMAN and does not need endoscopic evaluation prior to TRUMAN.   -Zofran PRN for Nausea    #/Renal  - ESRD: HD on MWF. Most recent Scr 6.40. Potassium is stable.   - Patient received dialysis after cardiac arrest for acidemia on 7/13. Per nephro, will receive dialysis again today  - c/w Epo 20k tiw with HD      #Skin  - HD Access: fistula in left upper arm  - Improving erythematous and tender on right wrist. Continue to monitor. RUE US neg for thrombus  - Will obtain       #ID  - S aureus PCR: (+),  MRSA PCR negative  - BCx on 7/7 now growing MRSA: restarted on vanc (7/11- )  - Vanc level on 7/13 was 24.8. f/u level today  - Will give vanco dose depending on HD today or not  - Surveillance blood Cx have been negative for 48hours, no longer need to continue    #Endocrine  - DMII: c/w Lispro SS. Monitor glucose.      #Hematologic/DVT ppx  - Heparin gtt      #Ethics  - full code as per wife- full code as per wife       71 yo M with PMhx of CAD s/p CABG (course at that time complicated by pleural effusion requiring chest tubes), ESRD on HD (MWF), DMII, HTN, afib on coumadin, and anemia transferred from [facility] for evaluation of chest pain after HD and GIB s/p 2U PRBC transfusion and aflutter/afib with RVR managed with metoprolol. Admitted to MICU for hypotension dependent on pressors likely 2/2 to sepsis.    #Neuro  - Being weaned off sedation to better evaluate mental status.  - CT head shows foci of low density in the R anterolateral frontal lobe and L posterior temporal lobe concerning for acute infarcts.  - Mechanism likely cardioembolic in setting of patient with known atrial fibrillation with cardiac arrest vs septic emboli  - per neurology, can consider MRI brain w/o contrast. However, unlikely to  since patient has known atrial fibrillation and will need to be continued of full dose anticoagulation.  - Avoid hypotension, keep -180 for neuroprotection  - c/w Atorvastatin 40MG QHS for secondary stroke prevention.    #Cardiovascular  - Cardiac arrest: Patient had asystole night of 7/13 and CPR was started. S/p epi x3 and shock x2 with return to NSR. Cards is following  - TTE on 7/13 showed Mild mitral regurgitation with biventricular dysfunction of LV and RV systolic function. Endocardium was not well visualized, Hypokinesis of the basal inferior wall and basal inferoseptum.  - Spoke with patient's son and he is okay with TRUMAN for his dad  - c/w Levophed wean as tolerated.   - BP goal 140-180 to maintain cerebral perfusion after stroke  - Will consider switching to midodrine once HR stable  - Troponins 2400, CKMB 7.1. Troponin stable from prior to cardiac arrest. They are likely 2/2 chronic condition/ESRD on HD vs Hypotensive event. Will continue to trend and monitor. The recent increase in trend likely due to lack of clearance 2/2 renal clearance    A. Fib  - on coumadin at home,  - c/ heparin gtt prophylaxis  - from neurology stand point, heparin is sufficient for now. Will need to be switched to DOAC in the future if kidney function allows  - 7/11: tried to transition heparin gtt to eliquis but pt is not a candidate due to his acute MIGUEL; back on heparin gtt   - c/ diltiazem for rate control    #Respiratory  - Loculated Pleural effusions: chest tube removed   - pleural fluid cx: (-); transudative.   - intubated and on ventilator (24/450/5/40). F/u ABG and adjust as necessary  - CXR s/p chest tube removal showed no evidence of atelectasis. Demonstrated loculated left pleural effusion decreased in size and new hazy right lower lung opacity which could be due to a small layering right pleural effusion with associated passive atelectasis, atelectasis of other cause, and/or pneumonia.  - abx as below    #GI/Nutrition:  - NPO with tube feed. Will make patient NPO after midnight  - Was C/o abdominal pain, bedside POCUS didn't show ascites. No abdominal pain at this time. Continue to monitor.   - negative occult bleeding, hgb stable  - Per GI, patient is stable to get TRUMAN and does not need endoscopic evaluation prior to TRUMAN. Scheduled for EEG tomorrow.   - Zofran PRN for Nausea.    #/Renal  - ESRD: HD on MWF. Most recent Scr 6.40. Potassium is stable.   - Patient received dialysis after cardiac arrest for acidemia on 7/13. Per nephro, will receive dialysis again today  - c/w Epo 20k tiw with HD      #Skin  - HD Access: fistula in left upper arm  - Improving erythematous and tender on right wrist. Continue to monitor. RUE US neg for thrombus  - Will obtain LUE US to evaluate for graft abscess.       #ID  - S aureus PCR: (+),  MRSA PCR negative  - BCx on 7/7 now growing MRSA: restarted on vanc (7/11- )  - Vanc level on 7/13 was 24.8. f/u level today  - Vanco today was 17.4. Will wait for HD and give vanco 750 after HD  - Surveillance blood Cx have been negative for 48hours, no longer need to continue    #Endocrine  - DMII: c/w Lispro SS. Monitor glucose.      #Hematologic/DVT ppx  - Heparin gtt      #Ethics  - full code as per wife- full code as per wife

## 2021-07-15 NOTE — CHART NOTE - NSCHARTNOTEFT_GEN_A_CORE
: Valencia Sanchez    INDICATION: hypotension, tympanic abdomen     PROCEDURE:  [x ] LIMITED ECHO  [x ] LIMITED CHEST  [ ] LIMITED RETROPERITONEAL  [x ] LIMITED ABDOMINAL  [ ] LIMITED DVT  [ ] NEEDLE GUIDANCE VASCULAR  [ ] NEEDLE GUIDANCE THORACENTESIS  [ ] NEEDLE GUIDANCE PARACENTESIS  [ ] NEEDLE GUIDANCE PERICARDIOCENTESIS  [ ] OTHER    FINDINGS:  Lung: Small left sided pleural effusion, B lines seen in LLB area. A line predominant in other areas.   Cardiac: mild-mod decrease in LV systolic function, no pericardial effusion appreciated. VTI estimated 14.7 on five chamber view. IVC estimated to be 1.9 cm.     INTERPRETATION:  Overall normal lung tissue. Mild to moderate decrease in LV systolic function. IVC indeterminate/wnl.     Images stored on [employer].    Valencia Sanchez, PGY-2 : Valencia Sanchez    INDICATION: septic shock, acute hypoxemic respiratory failure     PROCEDURE:  [x ] LIMITED ECHO  [x ] LIMITED CHEST  [ ] LIMITED RETROPERITONEAL  [ ] LIMITED ABDOMINAL  [ ] LIMITED DVT  [ ] NEEDLE GUIDANCE VASCULAR  [ ] NEEDLE GUIDANCE THORACENTESIS  [ ] NEEDLE GUIDANCE PARACENTESIS  [ ] NEEDLE GUIDANCE PERICARDIOCENTESIS  [ ] OTHER    FINDINGS:  Lung: Small left sided pleural effusion, B lines seen in LLB area. A line predominant in other areas.   Cardiac: mild-mod decrease in LV systolic function, no pericardial effusion appreciated. LVOT VTI estimated 14.7 cm by PW doppler on five chamber view. IVC estimated to be 1.9 cm.     INTERPRETATION:  Mild to moderate decrease in LV systolic function. IVC indeterminate/wnl.     Images stored on [employer].    Valencia Sanchez, PGY-2    Attending Attestation:  I was present during the key portions of the procedure and immediately available during the entire procedure.  Moraima Monroy MD  Attending  Pulmonary & Critical Care Medicine

## 2021-07-16 LAB
ALBUMIN SERPL ELPH-MCNC: 2.3 G/DL — LOW (ref 3.3–5)
ALP SERPL-CCNC: 89 U/L — SIGNIFICANT CHANGE UP (ref 40–120)
ALT FLD-CCNC: <5 U/L — LOW (ref 4–41)
ANION GAP SERPL CALC-SCNC: 13 MMOL/L — SIGNIFICANT CHANGE UP (ref 7–14)
APTT BLD: 38.5 SEC — HIGH (ref 27–36.3)
APTT BLD: >200 SEC — CRITICAL HIGH (ref 27–36.3)
APTT BLD: >200 SEC — CRITICAL HIGH (ref 27–36.3)
AST SERPL-CCNC: 23 U/L — SIGNIFICANT CHANGE UP (ref 4–40)
BASOPHILS # BLD AUTO: 0.04 K/UL — SIGNIFICANT CHANGE UP (ref 0–0.2)
BASOPHILS NFR BLD AUTO: 0.5 % — SIGNIFICANT CHANGE UP (ref 0–2)
BILIRUB SERPL-MCNC: 0.4 MG/DL — SIGNIFICANT CHANGE UP (ref 0.2–1.2)
BLOOD GAS ARTERIAL COMPREHENSIVE RESULT: SIGNIFICANT CHANGE UP
BUN SERPL-MCNC: 23 MG/DL — SIGNIFICANT CHANGE UP (ref 7–23)
CALCIUM SERPL-MCNC: 7.9 MG/DL — LOW (ref 8.4–10.5)
CHLORIDE SERPL-SCNC: 96 MMOL/L — LOW (ref 98–107)
CO2 SERPL-SCNC: 24 MMOL/L — SIGNIFICANT CHANGE UP (ref 22–31)
CREAT SERPL-MCNC: 4.99 MG/DL — HIGH (ref 0.5–1.3)
EOSINOPHIL # BLD AUTO: 0.1 K/UL — SIGNIFICANT CHANGE UP (ref 0–0.5)
EOSINOPHIL NFR BLD AUTO: 1.1 % — SIGNIFICANT CHANGE UP (ref 0–6)
GLUCOSE BLDC GLUCOMTR-MCNC: 128 MG/DL — HIGH (ref 70–99)
GLUCOSE BLDC GLUCOMTR-MCNC: 142 MG/DL — HIGH (ref 70–99)
GLUCOSE BLDC GLUCOMTR-MCNC: 154 MG/DL — HIGH (ref 70–99)
GLUCOSE BLDC GLUCOMTR-MCNC: 196 MG/DL — HIGH (ref 70–99)
GLUCOSE SERPL-MCNC: 128 MG/DL — HIGH (ref 70–99)
HCT VFR BLD CALC: 26.5 % — LOW (ref 39–50)
HGB BLD-MCNC: 7.9 G/DL — LOW (ref 13–17)
IANC: 6.54 K/UL — SIGNIFICANT CHANGE UP (ref 1.5–8.5)
IMM GRANULOCYTES NFR BLD AUTO: 2.4 % — HIGH (ref 0–1.5)
INR BLD: 1.46 RATIO — HIGH (ref 0.88–1.16)
LYMPHOCYTES # BLD AUTO: 0.84 K/UL — LOW (ref 1–3.3)
LYMPHOCYTES # BLD AUTO: 9.5 % — LOW (ref 13–44)
MCHC RBC-ENTMCNC: 27.5 PG — SIGNIFICANT CHANGE UP (ref 27–34)
MCHC RBC-ENTMCNC: 29.8 GM/DL — LOW (ref 32–36)
MCV RBC AUTO: 92.3 FL — SIGNIFICANT CHANGE UP (ref 80–100)
MONOCYTES # BLD AUTO: 1.07 K/UL — HIGH (ref 0–0.9)
MONOCYTES NFR BLD AUTO: 12.2 % — SIGNIFICANT CHANGE UP (ref 2–14)
NEUTROPHILS # BLD AUTO: 6.54 K/UL — SIGNIFICANT CHANGE UP (ref 1.8–7.4)
NEUTROPHILS NFR BLD AUTO: 74.3 % — SIGNIFICANT CHANGE UP (ref 43–77)
NRBC # BLD: 0 /100 WBCS — SIGNIFICANT CHANGE UP
NRBC # FLD: 0.03 K/UL — HIGH
PLATELET # BLD AUTO: 101 K/UL — LOW (ref 150–400)
POTASSIUM SERPL-MCNC: 3.9 MMOL/L — SIGNIFICANT CHANGE UP (ref 3.5–5.3)
POTASSIUM SERPL-SCNC: 3.9 MMOL/L — SIGNIFICANT CHANGE UP (ref 3.5–5.3)
PROT SERPL-MCNC: 5 G/DL — LOW (ref 6–8.3)
PROTHROM AB SERPL-ACNC: 16.4 SEC — HIGH (ref 10.6–13.6)
RBC # BLD: 2.87 M/UL — LOW (ref 4.2–5.8)
RBC # FLD: 19.2 % — HIGH (ref 10.3–14.5)
SODIUM SERPL-SCNC: 133 MMOL/L — LOW (ref 135–145)
VANCOMYCIN FLD-MCNC: 27 UG/ML
WBC # BLD: 8.8 K/UL — SIGNIFICANT CHANGE UP (ref 3.8–10.5)
WBC # FLD AUTO: 8.8 K/UL — SIGNIFICANT CHANGE UP (ref 3.8–10.5)

## 2021-07-16 PROCEDURE — 99232 SBSQ HOSP IP/OBS MODERATE 35: CPT

## 2021-07-16 PROCEDURE — 99291 CRITICAL CARE FIRST HOUR: CPT

## 2021-07-16 PROCEDURE — 76604 US EXAM CHEST: CPT | Mod: 26,GC

## 2021-07-16 RX ORDER — DEXMEDETOMIDINE HYDROCHLORIDE IN 0.9% SODIUM CHLORIDE 4 UG/ML
0.2 INJECTION INTRAVENOUS
Qty: 400 | Refills: 0 | Status: DISCONTINUED | OUTPATIENT
Start: 2021-07-16 | End: 2021-07-16

## 2021-07-16 RX ORDER — NOREPINEPHRINE BITARTRATE/D5W 8 MG/250ML
0.05 PLASTIC BAG, INJECTION (ML) INTRAVENOUS
Qty: 16 | Refills: 0 | Status: DISCONTINUED | OUTPATIENT
Start: 2021-07-16 | End: 2021-07-20

## 2021-07-16 RX ADMIN — Medication 81 MILLIGRAM(S): at 12:25

## 2021-07-16 RX ADMIN — Medication 30 MILLIGRAM(S): at 00:31

## 2021-07-16 RX ADMIN — Medication 4.04 MICROGRAM(S)/KG/MIN: at 21:36

## 2021-07-16 RX ADMIN — PANTOPRAZOLE SODIUM 40 MILLIGRAM(S): 20 TABLET, DELAYED RELEASE ORAL at 17:36

## 2021-07-16 RX ADMIN — CHLORHEXIDINE GLUCONATE 1 APPLICATION(S): 213 SOLUTION TOPICAL at 12:25

## 2021-07-16 RX ADMIN — Medication 1: at 00:18

## 2021-07-16 RX ADMIN — Medication 1: at 12:21

## 2021-07-16 RX ADMIN — Medication 4.04 MICROGRAM(S)/KG/MIN: at 11:22

## 2021-07-16 RX ADMIN — CHLORHEXIDINE GLUCONATE 15 MILLILITER(S): 213 SOLUTION TOPICAL at 05:51

## 2021-07-16 RX ADMIN — PANTOPRAZOLE SODIUM 40 MILLIGRAM(S): 20 TABLET, DELAYED RELEASE ORAL at 05:51

## 2021-07-16 RX ADMIN — CHLORHEXIDINE GLUCONATE 15 MILLILITER(S): 213 SOLUTION TOPICAL at 17:36

## 2021-07-16 RX ADMIN — Medication 30 MILLIGRAM(S): at 05:51

## 2021-07-16 RX ADMIN — Medication 250 MILLIGRAM(S): at 00:31

## 2021-07-16 RX ADMIN — ATORVASTATIN CALCIUM 40 MILLIGRAM(S): 80 TABLET, FILM COATED ORAL at 21:35

## 2021-07-16 NOTE — PROGRESS NOTE ADULT - SUBJECTIVE AND OBJECTIVE BOX
EP ATTENDING    tele: AFib  prior PEA/Jay/Asystole arrest.  resuscitated and intubated, on pressors.  antibiotics for MRSA bacteremia.  CT head w/ interval hypodensities concerning for stroke.  waking up vent.    acetaminophen   Tablet .. 1000 milliGRAM(s) Oral every 6 hours PRN  aspirin  chewable 81 milliGRAM(s) Oral daily  atorvastatin 40 milliGRAM(s) Oral at bedtime  calamine/zinc oxide Lotion 1 Application(s) Topical three times a day PRN  chlorhexidine 0.12% Liquid 15 milliLiter(s) Oral Mucosa every 12 hours  chlorhexidine 2% Cloths 1 Application(s) Topical daily  dexMEDEtomidine Infusion 0.2 MICROgram(s)/kG/Hr IV Continuous <Continuous>  dextrose 40% Gel 15 Gram(s) Oral once  dextrose 40% Gel 15 Gram(s) Oral once  dextrose 5%. 1000 milliLiter(s) IV Continuous <Continuous>  dextrose 5%. 1000 milliLiter(s) IV Continuous <Continuous>  dextrose 50% Injectable 25 Gram(s) IV Push once  dextrose 50% Injectable 12.5 Gram(s) IV Push once  dextrose 50% Injectable 25 Gram(s) IV Push once  diltiazem    Tablet 30 milliGRAM(s) Oral four times a day  epoetin danilo-epbx (RETACRIT) Injectable 93540 Unit(s) IV Push <User Schedule>  glucagon  Injectable 1 milliGRAM(s) IntraMuscular once  insulin lispro (ADMELOG) corrective regimen sliding scale   SubCutaneous every 6 hours  norepinephrine Infusion 0.05 MICROgram(s)/kG/Min IV Continuous <Continuous>  pantoprazole  Injectable 40 milliGRAM(s) IV Push every 12 hours  propofol Infusion 10 MICROgram(s)/kG/Min IV Continuous <Continuous>                            7.9    8.80  )-----------( 101      ( 16 Jul 2021 05:51 )             26.5       07-16    133<L>  |  96<L>  |  23  ----------------------------<  128<H>  3.9   |  24  |  4.99<H>    Ca    7.9<L>      16 Jul 2021 05:51  Mg     2.00     07-15    TPro  5.0<L>  /  Alb  2.3<L>  /  TBili  0.4  /  DBili  x   /  AST  23  /  ALT  <5<L>  /  AlkPhos  89  07-16      CARDIAC MARKERS ( 14 Jul 2021 03:59 )  x     / x     / 29 U/L / x     / x          T(C): 37.4 (07-16-21 @ 12:00), Max: 37.4 (07-16-21 @ 12:00)  HR: 60 (07-16-21 @ 13:00) (52 - 87)  BP: 139/41 (07-16-21 @ 13:00) (67/34 - 157/42)  RR: 14 (07-16-21 @ 13:00) (14 - 32)  SpO2: 99% (07-16-21 @ 13:00) (91% - 100%)  Wt(kg): --    I&O's Summary    15 Jul 2021 07:01  -  16 Jul 2021 07:00  --------------------------------------------------------  IN: 1266.4 mL / OUT: 900 mL / NET: 366.4 mL    16 Jul 2021 07:01  -  16 Jul 2021 14:39  --------------------------------------------------------  IN: 53.3 mL / OUT: 0 mL / NET: 53.3 mL    General: ill appearing, intubated and sedated  Head: Normocephalic and atraumatic.   Neck: No JVD. No bruits. Supple. Does not appear to be enlarged.  Left IJ CVL.  Intubated.    Cardiovascular: + S1,S2 ; IRR Soft systolic murmur at the left lower sternal border. No rubs noted.    Lungs: coarse breath sounds bL.   Abdomen: + BS, soft. Non distended.   Extremities: limbs warm, anasarca.  Psychiatric: unable to assess due to sedation  Musculoskeletal: unable to assess strength and ROM due to sedation.    A/P) 69 y/o male PMH CAD s/p CABG, PAF/AFL on coumadin, HTN, hyperlipidemia, DM, ESRD on HD, hypothyroidism a/w sepsis    -continue diltiazem for AF rate control.  -heparin infusion for secondary stroke prevention  - s/p PEA/jay/asystole arrest, secondary to critical illness and infection.  no pacemaker or ICD indicated.  -no further inpatient EP workup needed    - will follow.    Pierce Nguyen M.D.  Cardiac Electrophysiology  [phone]

## 2021-07-16 NOTE — PROGRESS NOTE ADULT - SUBJECTIVE AND OBJECTIVE BOX
Date of Service: 07-16-21 @ 14:49    Patient is a 70y old  Male who presents with a chief complaint of Chest pain and GIB (16 Jul 2021 14:39)      Any change in ROS: intubated and on pressors:     MEDICATIONS  (STANDING):  aspirin  chewable 81 milliGRAM(s) Oral daily  atorvastatin 40 milliGRAM(s) Oral at bedtime  chlorhexidine 0.12% Liquid 15 milliLiter(s) Oral Mucosa every 12 hours  chlorhexidine 2% Cloths 1 Application(s) Topical daily  dexMEDEtomidine Infusion 0.2 MICROgram(s)/kG/Hr (4.31 mL/Hr) IV Continuous <Continuous>  dextrose 40% Gel 15 Gram(s) Oral once  dextrose 40% Gel 15 Gram(s) Oral once  dextrose 5%. 1000 milliLiter(s) (50 mL/Hr) IV Continuous <Continuous>  dextrose 5%. 1000 milliLiter(s) (100 mL/Hr) IV Continuous <Continuous>  dextrose 50% Injectable 25 Gram(s) IV Push once  dextrose 50% Injectable 12.5 Gram(s) IV Push once  dextrose 50% Injectable 25 Gram(s) IV Push once  diltiazem    Tablet 30 milliGRAM(s) Oral four times a day  epoetin danilo-epbx (RETACRIT) Injectable 17806 Unit(s) IV Push <User Schedule>  glucagon  Injectable 1 milliGRAM(s) IntraMuscular once  insulin lispro (ADMELOG) corrective regimen sliding scale   SubCutaneous every 6 hours  norepinephrine Infusion 0.05 MICROgram(s)/kG/Min (4.04 mL/Hr) IV Continuous <Continuous>  pantoprazole  Injectable 40 milliGRAM(s) IV Push every 12 hours  propofol Infusion 10 MICROgram(s)/kG/Min (5.17 mL/Hr) IV Continuous <Continuous>    MEDICATIONS  (PRN):  acetaminophen   Tablet .. 1000 milliGRAM(s) Oral every 6 hours PRN Temp greater or equal to 38C (100.4F), Mild Pain (1 - 3), Moderate Pain (4 - 6)  calamine/zinc oxide Lotion 1 Application(s) Topical three times a day PRN Itching    Vital Signs Last 24 Hrs  T(C): 37.4 (16 Jul 2021 12:00), Max: 37.4 (16 Jul 2021 12:00)  T(F): 99.4 (16 Jul 2021 12:00), Max: 99.4 (16 Jul 2021 12:00)  HR: 60 (16 Jul 2021 13:00) (52 - 87)  BP: 139/41 (16 Jul 2021 13:00) (67/34 - 157/42)  BP(mean): 65 (16 Jul 2021 13:00) (40 - 83)  RR: 14 (16 Jul 2021 13:00) (14 - 32)  SpO2: 99% (16 Jul 2021 13:00) (91% - 100%)  Mode: AC/ CMV (Assist Control/ Continuous Mandatory Ventilation)  RR (machine): 14  TV (machine): 400  FiO2: 40  PEEP: 5  ITime: 0.8  MAP: 10  PIP: 23    I&O's Summary    15 Jul 2021 07:01  -  16 Jul 2021 07:00  --------------------------------------------------------  IN: 1266.4 mL / OUT: 900 mL / NET: 366.4 mL    16 Jul 2021 07:01  -  16 Jul 2021 14:49  --------------------------------------------------------  IN: 53.3 mL / OUT: 0 mL / NET: 53.3 mL          Physical Exam:   GENERAL: NAD, well-groomed, well-developed  HEENT: KATHY/   Atraumatic, Normocephalic  ENMT: No tonsillar erythema, exudates, or enlargement; Moist mucous membranes, Good dentition, No lesions  NECK: Supple, No JVD, Normal thyroid  CHEST/LUNG: Clear to auscultaion  CVS: Regular rate and rhythm; No murmurs, rubs, or gallops  GI: : Soft, Nontender, Nondistended; Bowel sounds present  NERVOUS SYSTEM:  intubated and sedated   EXTREMITIES:  - edema  LYMPH: No lymphadenopathy noted  SKIN: No rashes or lesions  ENDOCRINOLOGY: No Thyromegaly  PSYCH: intuabted and sedated     Labs:  ABG - ( 16 Jul 2021 03:14 )  pH, Arterial: 7.51  pH, Blood: x     /  pCO2: 35    /  pO2: 98    / HCO3: 29    / Base Excess: 4.6   /  SaO2: 98.2            22                            7.9    8.80  )-----------( 101      ( 16 Jul 2021 05:51 )             26.5                         8.2    10.98 )-----------( 162      ( 15 Jul 2021 04:24 )             26.9                         9.0    14.22 )-----------( 226      ( 14 Jul 2021 03:59 )             29.2                         9.1    13.95 )-----------( 220      ( 13 Jul 2021 02:50 )             30.6                         8.9    13.60 )-----------( 234      ( 12 Jul 2021 21:14 )             29.7     07-16    133<L>  |  96<L>  |  23  ----------------------------<  128<H>  3.9   |  24  |  4.99<H>  07-15    135  |  96<L>  |  28<H>  ----------------------------<  173<H>  3.3<L>   |  20<L>  |  6.40<H>  07-14    132<L>  |  92<L>  |  20  ----------------------------<  159<H>  3.5   |  18<L>  |  5.18<H>  07-13    131<L>  |  92<L>  |  24<H>  ----------------------------<  201<H>  3.6   |  18<L>  |  6.40<H>  07-12    130<L>  |  92<L>  |  23  ----------------------------<  183<H>  4.2   |  21<L>  |  6.05<H>    Ca    7.9<L>      16 Jul 2021 05:51  Ca    7.7<L>      15 Jul 2021 04:24  Mg     2.00     07-15    TPro  5.0<L>  /  Alb  2.3<L>  /  TBili  0.4  /  DBili  x   /  AST  23  /  ALT  <5<L>  /  AlkPhos  89  07-16  TPro  4.6<L>  /  Alb  2.1<L>  /  TBili  0.4  /  DBili  x   /  AST  17  /  ALT  <5<L>  /  AlkPhos  89  07-15  TPro  4.7<L>  /  Alb  2.5<L>  /  TBili  0.5  /  DBili  x   /  AST  21  /  ALT  <5<L>  /  AlkPhos  82  07-14  TPro  5.7<L>  /  Alb  3.0<L>  /  TBili  0.5  /  DBili  x   /  AST  24  /  ALT  5   /  AlkPhos  92  07-13  TPro  5.7<L>  /  Alb  3.0<L>  /  TBili  0.4  /  DBili  x   /  AST  24  /  ALT  5   /  AlkPhos  93  07-12    CAPILLARY BLOOD GLUCOSE      POCT Blood Glucose.: 154 mg/dL (16 Jul 2021 11:58)  POCT Blood Glucose.: 128 mg/dL (16 Jul 2021 05:44)  POCT Blood Glucose.: 196 mg/dL (16 Jul 2021 00:13)  POCT Blood Glucose.: 178 mg/dL (15 Jul 2021 18:32)      LIVER FUNCTIONS - ( 16 Jul 2021 05:51 )  Alb: 2.3 g/dL / Pro: 5.0 g/dL / ALK PHOS: 89 U/L / ALT: <5 U/L / AST: 23 U/L / GGT: x           PT/INR - ( 16 Jul 2021 11:16 )   PT: 16.4 sec;   INR: 1.46 ratio         PTT - ( 16 Jul 2021 11:16 )  PTT:>200.0 sec          RECENT CULTURES:  07-12 @ 06:12 .Blood Blood       r< from: CT Abdomen and Pelvis w/ IV Cont (07.13.21 @ 18:37) >  KIDNEYS/URETERS: Atrophic bilateral kidneys. No hydronephrosis. Exophytic 1.3 cm cyst arising from the left kidney.    BLADDER: Decompressed.  REPRODUCTIVE ORGANS: Prostate within normal limits. Bilateral hydroceles.    BOWEL: No bowel obstruction. Appendix is normal. Enteric tube is present with the tip in stomach.  PERITONEUM: Mild to moderate ascites in the perihepatic and perisplenic regions, tracking along the paracolic gutters. Trace ascites in the pelvis.  VESSELS: Atherosclerotic changes.  RETROPERITONEUM/LYMPH NODES: No lymphadenopathy.  ABDOMINAL WALL: Diffuse subcutaneous edema.  BONES: Degenerative changes.    IMPRESSION:  *  Mild pulmonary edema with bilateral partially loculated pleural effusions with adjacent passive atelectasis.  *  Debris within the right mainstem with associated partial atelectasis of the right lower lobe.  *  Mild to moderate ascites.    --- End of Report ---    < end of copied text >           No growth to date.    07-11 @ 23:16 .Blood Blood-Peripheral                No growth to date.          RESPIRATORY CULTURES:          Studies  Chest X-RAY  CT SCAN Chest   Venous Dopplers: LE:   CT Abdomen  Others

## 2021-07-16 NOTE — PROGRESS NOTE ADULT - ASSESSMENT
69 yo M with PMhx of CAD s/p CABG (course at that time complicated by pleural effusion requiring chest tubes), ESRD on HD (MWF), DMII, HTN, afib on coumadin, and anemia presented from [facility] for evaluation of chest pain and GIB.       left sided pleural effusion: loculated and chronic:   CAD:  CABG:  ESRD:   DM:  HTN:  A fibrillation:   GI Bleed    7/7:    left sided pleural effusion: loculated and chronic: he has chr loculated pleural effusion on left side: He had chest tube placement in last June 2020 at another hospital: no chemistry is available but he had negative cytology at that time: This time the effusion seems slightly worse and has loculated effusion with pleural thickening: heis not SOB andhasbeen on roomair: I think , it at all, if this effusion needs to be dealt with : it is probably vats: will contact thoracic surgery : etiology not very clear as there is no previous chemistry: coult be exudative or transudatve: he is on HD and now it is a chr christiano effusion   CAD: cont per cards  CABG: cont current meds  ESRD: on HD   DM: monitor and control  HTN: controlled  A fibrillation: off ac:   GI Bleed: per gi :  DW pt and t eam1    7/8:    left sided pleural effusion:: fever: s/p left sided chest tube placement: cultures sent: however with pr criteria it seems transudative await serum LDH but pleural fluid LDH is low:  already started on broad spectrum antibiotics ID to see: await cultures:   CAD: cont per cards  CABG: cont current meds  ESRD: on HD   DM: monitor and control  HTN: controlled  A fibrillation: off ac:   GI Bleed: per gi :  DW pmd    7/9:    left sided pleural effusion:: fever: s/p left sided chest tube placement: cultures sent: however with pr criteria it seems borderline transudative and transudative by LDH criteria: the vulutres ahve been negative so far:  already started on broad spectrum antibiotics ID to see: await cultures: ? source of spesis  CAD: cont per cards  CABG: cont current meds  ESRD: on HD   DM: monitor and control  HTN: in shock: now on vasopressors:   A fibrillation: off ac:   GI Bleed: per gi :  DW pmd  micu team    7/11:      left sided pleural effusion:: fever: s/p left sided chest tube placement: cultures sent: however with pr criteria it seems borderline transudative and transudative by LDH criteria: the cultures have been negative so far:  already started on broad spectrum antibiotics : Has MSSA bactermia  CAD: cont per cards: still on vasopressors: wean as tolerated ? needs molly ro tule out IE : would defer to cards:   CABG: cont current meds  ESRD: on HD   DM: monitor and control  HTN: in shock: now on vasopressors:   A fibrillation: off ac:   GI Bleed: per gi :  DW pmd  micu team    7/12:    left sided pleural effusion:: fever: s/p left sided chest tube placement:- now removed: cultures sent: however with pr criteria it seems borderline transudative and transudative by LDH criteria: the cultures have been negative so far:  already started on broad spectrum antibiotics : Has MRSA bactermia: vanco restarted yesterday ? echo/molly  Ac hypercarbic resp fialure: needs to be watched closelyin MICU : needs bipap may need intubation" At this time he is alert and awake: and responds to simple questions:    CAD: cont per cards: still on vasopressors: wean as tolerated ? needs molly ro tule out IE : would defer to cards:   CABG: cont current meds  ESRD: on HD   DM: monitor and control  HTN: in shock: now on vasopressors:   A fibrillation: off ac:   GI Bleed: per gi :  DW pmd  cards and bedstaff    7/13:  left sided pleural effusion:: fever: s/p left sided chest tube placement:- now removed: cultures sent: however with pr criteria it seems borderline transudative and transudative by LDH criteria: the cultures have been negative so far:  already started on broad spectrum antibiotics : Has MRSA bacteremia vanco restarted yesterday ? echo/molly  Ac hypercarbic resp fialure: now intubated s/p acls protocol last night   CAD: cont per cards: still on vasopressors: wean as tolerated ? needs molly ro tule out IE : would defer to cards:   CABG: cont current meds  ESRD: on HD   DM: monitor and control  HTN: in shock: now on vasopressors:   A fibrillation: off ac:   GI Bleed: per gi :  LINDA  staff  pt is critically ill now:     7/14:  left sided pleural effusion:: fever: s/p left sided chest tube placement:- now removed: cultures sent: however with pr criteria it seems borderline transudative and transudative by LDH criteria: the cultures have been negative so far:  already started on broad spectrum antibiotics : Has MRSA bacteremia vanco restarted yesterday now for  echo/molly  Ac hypercarbic resp fialure: now intubated s/p acls protocol last night -s/p cardiac arrest: ROSC 10 mts:   CAD: cont per cards: still on vasopressors: wean as tolerated ? needs molly ro tule out IE : would defer to cards: Cotn antbitioc  CABG: cont current meds  ESRD: on HD   DM: monitor and control  HTN: in shock: now on vasopressors:   A fibrillation: off ac:   GI Bleed: per gi :  DW  staff  pt is critically ill now: dw cards    7/15:    left sided pleural effusion:: transudative pl effusion with no with: certainly not the source for MRSA in blood:   Ac hypercarbic resp fialure: now intubated s/p acls protocol last night -s/p cardiac arrest: ROSC 10 mts:   CAD: cont per cards: still on vasopressors: wean as tolerated ? needs molly ro tule out IE : would defer to cards: Cotn antbitioc  CABG: cont current meds  ESRD: on HD   DM: monitor and control  HTN: in shock: now on vasopressors:   A fibrillation: on ac:   GI Bleed: per gi :  DW  staff  pt is critically ill now:     7/16"  left sided pleural effusion:: transudative pl effusion with no with: certainly not the source for MRSA in blood: ?source for MRSA:   Ac hypercarbic resp fialure: now intubated s/p acls protocol last night -s/p cardiac arrest: ROSC 10 mts: he seems to respond off sedation:per MICU note:   CAD: cont per cards: still on vasopressors: wean as tolerated ? needs molly ro tule out IE : would defer to cards: Cotn antbitioc  CABG: cont current meds  ESRD: on HD   DM: monitor and control  HTN: in shock: now on vasopressors:   A fibrillation: on ac:   GI Bleed: per gi :  DW  staff: PMD  pt is critically ill now:

## 2021-07-16 NOTE — PROGRESS NOTE ADULT - SUBJECTIVE AND OBJECTIVE BOX
INTERVAL HPI/OVERNIGHT EVENTS:    seen this am in micu with RN bedside   pt intubated   no bm's again (for about 3-4 days)    MEDICATIONS  (STANDING):  aspirin  chewable 81 milliGRAM(s) Oral daily  atorvastatin 40 milliGRAM(s) Oral at bedtime  chlorhexidine 0.12% Liquid 15 milliLiter(s) Oral Mucosa every 12 hours  chlorhexidine 2% Cloths 1 Application(s) Topical daily  dexMEDEtomidine Infusion 0.2 MICROgram(s)/kG/Hr (4.31 mL/Hr) IV Continuous <Continuous>  dextrose 40% Gel 15 Gram(s) Oral once  dextrose 40% Gel 15 Gram(s) Oral once  dextrose 5%. 1000 milliLiter(s) (50 mL/Hr) IV Continuous <Continuous>  dextrose 5%. 1000 milliLiter(s) (100 mL/Hr) IV Continuous <Continuous>  dextrose 50% Injectable 25 Gram(s) IV Push once  dextrose 50% Injectable 12.5 Gram(s) IV Push once  dextrose 50% Injectable 25 Gram(s) IV Push once  diltiazem    Tablet 30 milliGRAM(s) Oral four times a day  epoetin danilo-epbx (RETACRIT) Injectable 07710 Unit(s) IV Push <User Schedule>  glucagon  Injectable 1 milliGRAM(s) IntraMuscular once  insulin lispro (ADMELOG) corrective regimen sliding scale   SubCutaneous every 6 hours  norepinephrine Infusion 0.05 MICROgram(s)/kG/Min (4.04 mL/Hr) IV Continuous <Continuous>  pantoprazole  Injectable 40 milliGRAM(s) IV Push every 12 hours  propofol Infusion 10 MICROgram(s)/kG/Min (5.17 mL/Hr) IV Continuous <Continuous>    MEDICATIONS  (PRN):  acetaminophen   Tablet .. 1000 milliGRAM(s) Oral every 6 hours PRN Temp greater or equal to 38C (100.4F), Mild Pain (1 - 3), Moderate Pain (4 - 6)  calamine/zinc oxide Lotion 1 Application(s) Topical three times a day PRN Itching      Allergies    lisinopril (Other)  opioid-like analgesics (Other)    Intolerances        Review of Systems: *pt  nonverbal, unable to obtain ROS         Vital Signs Last 24 Hrs  T(C): 37.4 (16 Jul 2021 12:00), Max: 37.4 (16 Jul 2021 12:00)  T(F): 99.4 (16 Jul 2021 12:00), Max: 99.4 (16 Jul 2021 12:00)  HR: 60 (16 Jul 2021 13:00) (52 - 87)  BP: 139/41 (16 Jul 2021 13:00) (67/34 - 177/43)  BP(mean): 65 (16 Jul 2021 13:00) (40 - 83)  RR: 14 (16 Jul 2021 13:00) (14 - 32)  SpO2: 99% (16 Jul 2021 13:00) (91% - 100%)    PHYSICAL EXAM:    Constitutional: NAD  HEENT: EOMI, throat clear  Neck: No LAD, supple  Respiratory: CTA and P  Cardiovascular: S1 and S2, RRR, no M  Gastrointestinal: BS+, soft, NT/ND, neg HSM,  Extremities: No peripheral edema, neg clubbing, cyanosis  Vascular: 2+ peripheral pulses  Neurological: A/O x 0  Psychiatric: intubated  Skin: No rashes      LABS:                        7.9    8.80  )-----------( 101      ( 16 Jul 2021 05:51 )             26.5     07-16    133<L>  |  96<L>  |  23  ----------------------------<  128<H>  3.9   |  24  |  4.99<H>    Ca    7.9<L>      16 Jul 2021 05:51  Mg     2.00     07-15    TPro  5.0<L>  /  Alb  2.3<L>  /  TBili  0.4  /  DBili  x   /  AST  23  /  ALT  <5<L>  /  AlkPhos  89  07-16    PT/INR - ( 16 Jul 2021 11:16 )   PT: 16.4 sec;   INR: 1.46 ratio         PTT - ( 16 Jul 2021 11:16 )  PTT:>200.0 sec      RADIOLOGY & ADDITIONAL TESTS:

## 2021-07-16 NOTE — PROGRESS NOTE ADULT - ASSESSMENT
71 yo M with PMhx of CAD s/p CABG (course at that time complicated by pleural effusion requiring chest tubes), ESRD on HD (MWF), DMII, HTN, afib on coumadin, and anemia presented from [facility] for evaluation of chest pain and GIB. Renal following for ESRD Mx. s/p cardiac arrest. CTH showed acute infarcts.     ESRD  Maintenance schedule MWF  Pt last dialyzed yesterday, without acute event.   Flowsheet reviewed. Plan for next HD tomorrow, and then return to MWF schedule next week.   Improved hemodynamics, pressor titration as per ICU team  dose all meds for ESRD    Anemia in CKD+GIB- Hb <goal  c/w Epo 20k tiw with HD  monitor h/h, neg FOBT  GI following, No plans for cscope.     Rudy Mendiola MD  [facility]  Office [phone]  Ans Serv [phone]  [facility] [phone]

## 2021-07-16 NOTE — CHART NOTE - NSCHARTNOTEFT_GEN_A_CORE
NUTRITION FOLLOW-UP    Re-CONSULT: Tube Feeding        _________________Diet___________________  Diet, NPO after Midnight:      NPO Start Date: 15-Jul-2021,   NPO Start Time: 23:59 (07-15-21 @ 19:25)      (Prior to NPO)  Diet, NPO with Tube Feed:   Tube Feeding Modality: Orogastric  Nepro with Carb Steady (NEPRORTH)  Total Volume for 24 Hours (mL): 960  Continuous  Starting Tube Feed Rate {mL per Hour}: 10     Every 4 hours  Until Goal Tube Feed Rate (mL per Hour): 40  Tube Feed Duration (in Hours): 24  Tube Feed Start Time: 11:30 (07-14-21 @ 11:29)    Enteral Regimen Provides: 1728 kcals (23 kcals/kg ABW)                                         78g protein (1.0g protein/kg ABW)                                         698mL free water        69yo M w PMH CAD, ESRD on HD, DM, HTN, hypercholesterolemia, CVA, Aflutter on Warfarin.  Admitted for chest pain and possible GIB.  Now with hypoxic respiratory failure, chest tubes placed for pleural effusion.  Pt. became hypotensive, RRT (7/8) and transferred to MICU. Now with hypercarbic respiratory failure requiring intubation.      % PO intake prior to intubation.  Tube feeding currently on hold for TRUMAN.  Prior to being held, no noted/reported intolerance to enteral feeding.  No vomiting/diarrhea/constipation or abdominal distention.  Last BM (7/13).   Advise addition of NoCarb Prosource 3 packets/day to more appropriate meet increased protein needs. Can resume current TF regimen, if/when medically appropriate.    Pt. with persistent weight increase throughout hospitalization, possibly fluid related.           Weight:                                           Height:  66"                   Ideal Body Weight: 75.5kg   87.7kg (7/16)  86.0kg (7/13)  85.5kg (7/11)  80.2kg (7/9)  75.5kg (7/7)      Re-Calculated Estimated Energy/Protein Needs:  20-25 kcals/kg ABW= 1099-9667 kcals/day  1.6-1.8g protein/kg ABW= 121-136 g protein/day          Edema:  2+ generalized    Skin:  Intact.       ________________________Pertinent Medications____________   MEDICATIONS  (STANDING):  aspirin  chewable 81 milliGRAM(s) Oral daily  atorvastatin 40 milliGRAM(s) Oral at bedtime  chlorhexidine 0.12% Liquid 15 milliLiter(s) Oral Mucosa every 12 hours  chlorhexidine 2% Cloths 1 Application(s) Topical daily  dexMEDEtomidine Infusion 0.2 MICROgram(s)/kG/Hr (4.31 mL/Hr) IV Continuous <Continuous>  dextrose 40% Gel 15 Gram(s) Oral once  dextrose 40% Gel 15 Gram(s) Oral once  dextrose 5%. 1000 milliLiter(s) (50 mL/Hr) IV Continuous <Continuous>  dextrose 5%. 1000 milliLiter(s) (100 mL/Hr) IV Continuous <Continuous>  dextrose 50% Injectable 25 Gram(s) IV Push once  dextrose 50% Injectable 12.5 Gram(s) IV Push once  dextrose 50% Injectable 25 Gram(s) IV Push once  diltiazem    Tablet 30 milliGRAM(s) Oral four times a day  epoetin danilo-epbx (RETACRIT) Injectable 75256 Unit(s) IV Push <User Schedule>  glucagon  Injectable 1 milliGRAM(s) IntraMuscular once  insulin lispro (ADMELOG) corrective regimen sliding scale   SubCutaneous every 6 hours  norepinephrine Infusion 0.05 MICROgram(s)/kG/Min (4.04 mL/Hr) IV Continuous <Continuous>  pantoprazole  Injectable 40 milliGRAM(s) IV Push every 12 hours  propofol Infusion 10 MICROgram(s)/kG/Min (5.17 mL/Hr) IV Continuous <Continuous>    MEDICATIONS  (PRN):  acetaminophen   Tablet .. 1000 milliGRAM(s) Oral every 6 hours PRN Temp greater or equal to 38C (100.4F), Mild Pain (1 - 3), Moderate Pain (4 - 6)  calamine/zinc oxide Lotion 1 Application(s) Topical three times a day PRN Itching          _________________________Pertinent Labs____________________     07-16    133<L>  |  96<L>  |  23  ----------------------------<  128<H>  3.9   |  24  |  4.99<H>    Ca    7.9<L>      16 Jul 2021 05:51  Mg     2.00     07-15    TPro  5.0<L>  /  Alb  2.3<L>  /  TBili  0.4  /  DBili  x   /  AST  23  /  ALT  <5<L>  /  AlkPhos  89  07-16                                                                   7.9    8.80  )-----------( 101      ( 16 Jul 2021 05:51 )             26.5         CAPILLARY BLOOD GLUCOSE      POCT Blood Glucose.: 128 mg/dL (16 Jul 2021 05:44)    POCT Blood Glucose.: 128 mg/dL (07-16-21 @ 05:44)  POCT Blood Glucose.: 196 mg/dL (07-16-21 @ 00:13)  POCT Blood Glucose.: 178 mg/dL (07-15-21 @ 18:32)  POCT Blood Glucose.: 171 mg/dL (07-15-21 @ 12:00)              PLAN/RECOMMENDATIONS:    1) Resume enteral feedings, when medically appropriate.  Nepro @ 40mL/hr continuously + 3 packets NoCarb Prosource per day               [provides 960mL volume                              1728 kcals (23 kcals/kg ABW),                               123g total protein (1.6g proteing/kg ABW)                              698mL free water, daily]  2) Obtain daily weights  3) Monitor tolerance to enteral feedings, if/when to re-initiate.      RDN remains available and will f/u PRN.          Belkis Montano, AJIT, CDN pager [phone]

## 2021-07-16 NOTE — PROGRESS NOTE ADULT - SUBJECTIVE AND OBJECTIVE BOX
Mainor Bowers  PGY-1 | Internal Medicine      INTERVAL HPI/OVERNIGHT EVENTS:    SUBJECTIVE: Patient seen and examined at bedside.     ROS: Unable to assess    OBJECTIVE:    VITAL SIGNS:  ICU Vital Signs Last 24 Hrs  T(C): 37.4 (16 Jul 2021 12:00), Max: 37.4 (16 Jul 2021 12:00)  T(F): 99.4 (16 Jul 2021 12:00), Max: 99.4 (16 Jul 2021 12:00)  HR: 60 (16 Jul 2021 13:00) (52 - 87)  BP: 139/41 (16 Jul 2021 13:00) (67/34 - 177/43)  BP(mean): 65 (16 Jul 2021 13:00) (40 - 83)  RR: 14 (16 Jul 2021 13:00) (14 - 32)  SpO2: 99% (16 Jul 2021 13:00) (91% - 100%)    Mode: AC/ CMV (Assist Control/ Continuous Mandatory Ventilation), RR (machine): 14, TV (machine): 400, FiO2: 40, PEEP: 5, ITime: 0.8, MAP: 10, PIP: 23    07-15 @ 07:01  -  07-16 @ 07:00  --------------------------------------------------------  IN: 1266.4 mL / OUT: 900 mL / NET: 366.4 mL    07-16 @ 07:01 - 07-16 @ 13:29  --------------------------------------------------------  IN: 53.3 mL / OUT: 0 mL / NET: 53.3 mL      CAPILLARY BLOOD GLUCOSE      POCT Blood Glucose.: 154 mg/dL (16 Jul 2021 11:58)      PHYSICAL EXAM:    General: NAD. Patient is awake, opens eyes, and follows command  HEENT: NC/AT; PERRL, clear conjunctiva  Neck: supple  Respiratory: CTA b/l  Cardiovascular: +S1/S2; RRR  Abdomen: soft, NT/ND; +BS x4  Extremities: WWP, 2+ peripheral pulses b/l; no LE edema  Skin: normal color and turgor; no rash  Neurological: Patient is intubated and sedated    MEDICATIONS:  MEDICATIONS  (STANDING):  aspirin  chewable 81 milliGRAM(s) Oral daily  atorvastatin 40 milliGRAM(s) Oral at bedtime  chlorhexidine 0.12% Liquid 15 milliLiter(s) Oral Mucosa every 12 hours  chlorhexidine 2% Cloths 1 Application(s) Topical daily  dexMEDEtomidine Infusion 0.2 MICROgram(s)/kG/Hr (4.31 mL/Hr) IV Continuous <Continuous>  dextrose 40% Gel 15 Gram(s) Oral once  dextrose 40% Gel 15 Gram(s) Oral once  dextrose 5%. 1000 milliLiter(s) (50 mL/Hr) IV Continuous <Continuous>  dextrose 5%. 1000 milliLiter(s) (100 mL/Hr) IV Continuous <Continuous>  dextrose 50% Injectable 25 Gram(s) IV Push once  dextrose 50% Injectable 12.5 Gram(s) IV Push once  dextrose 50% Injectable 25 Gram(s) IV Push once  diltiazem    Tablet 30 milliGRAM(s) Oral four times a day  epoetin danilo-epbx (RETACRIT) Injectable 06091 Unit(s) IV Push <User Schedule>  glucagon  Injectable 1 milliGRAM(s) IntraMuscular once  insulin lispro (ADMELOG) corrective regimen sliding scale   SubCutaneous every 6 hours  norepinephrine Infusion 0.05 MICROgram(s)/kG/Min (4.04 mL/Hr) IV Continuous <Continuous>  pantoprazole  Injectable 40 milliGRAM(s) IV Push every 12 hours  propofol Infusion 10 MICROgram(s)/kG/Min (5.17 mL/Hr) IV Continuous <Continuous>    MEDICATIONS  (PRN):  acetaminophen   Tablet .. 1000 milliGRAM(s) Oral every 6 hours PRN Temp greater or equal to 38C (100.4F), Mild Pain (1 - 3), Moderate Pain (4 - 6)  calamine/zinc oxide Lotion 1 Application(s) Topical three times a day PRN Itching      ALLERGIES:  Allergies    lisinopril (Other)  opioid-like analgesics (Other)    Intolerances        LABS:                        7.9    8.80  )-----------( 101      ( 16 Jul 2021 05:51 )             26.5     07-16    133<L>  |  96<L>  |  23  ----------------------------<  128<H>  3.9   |  24  |  4.99<H>    Ca    7.9<L>      16 Jul 2021 05:51  Mg     2.00     07-15    TPro  5.0<L>  /  Alb  2.3<L>  /  TBili  0.4  /  DBili  x   /  AST  23  /  ALT  <5<L>  /  AlkPhos  89  07-16    PT/INR - ( 16 Jul 2021 11:16 )   PT: 16.4 sec;   INR: 1.46 ratio         PTT - ( 16 Jul 2021 11:16 )  PTT:>200.0 sec      RADIOLOGY & ADDITIONAL TESTS: Reviewed.

## 2021-07-16 NOTE — PROGRESS NOTE ADULT - SUBJECTIVE AND OBJECTIVE BOX
Follow Up:      Inverval History/ROS:Patient is a 70y old  Male who presents with a chief complaint of Chest pain and GIB (15 Jul 2021 15:46)    Intubated.  FiO2@ 40%    Allergies    lisinopril (Other)  opioid-like analgesics (Other)    Intolerances        ANTIMICROBIALS:      OTHER MEDS:  acetaminophen   Tablet .. 1000 milliGRAM(s) Oral every 6 hours PRN  aspirin  chewable 81 milliGRAM(s) Oral daily  atorvastatin 40 milliGRAM(s) Oral at bedtime  calamine/zinc oxide Lotion 1 Application(s) Topical three times a day PRN  chlorhexidine 0.12% Liquid 15 milliLiter(s) Oral Mucosa every 12 hours  chlorhexidine 2% Cloths 1 Application(s) Topical daily  dexMEDEtomidine Infusion 0.2 MICROgram(s)/kG/Hr IV Continuous <Continuous>  dextrose 40% Gel 15 Gram(s) Oral once  dextrose 40% Gel 15 Gram(s) Oral once  dextrose 5%. 1000 milliLiter(s) IV Continuous <Continuous>  dextrose 5%. 1000 milliLiter(s) IV Continuous <Continuous>  dextrose 50% Injectable 25 Gram(s) IV Push once  dextrose 50% Injectable 12.5 Gram(s) IV Push once  dextrose 50% Injectable 25 Gram(s) IV Push once  diltiazem    Tablet 30 milliGRAM(s) Oral four times a day  epoetin danilo-epbx (RETACRIT) Injectable 40209 Unit(s) IV Push <User Schedule>  glucagon  Injectable 1 milliGRAM(s) IntraMuscular once  insulin lispro (ADMELOG) corrective regimen sliding scale   SubCutaneous every 6 hours  norepinephrine Infusion 0.05 MICROgram(s)/kG/Min IV Continuous <Continuous>  pantoprazole  Injectable 40 milliGRAM(s) IV Push every 12 hours  propofol Infusion 10 MICROgram(s)/kG/Min IV Continuous <Continuous>      Vital Signs Last 24 Hrs  T(C): 36.8 (16 Jul 2021 08:00), Max: 37.1 (15 Jul 2021 20:00)  T(F): 98.3 (16 Jul 2021 08:00), Max: 98.8 (15 Jul 2021 20:00)  HR: 62 (16 Jul 2021 10:33) (62 - 87)  BP: 99/44 (16 Jul 2021 10:33) (99/44 - 177/43)  BP(mean): 57 (16 Jul 2021 10:33) (53 - 85)  RR: 17 (16 Jul 2021 10:33) (15 - 32)  SpO2: 100% (16 Jul 2021 10:33) (95% - 100%)    PHYSICAL EXAM:  General: [ x] intubated  HEAD/EYES: [ ] PERRL x[ ] white sclera [ ] icterus  ENT:  [ ] normal [x ] supple [ ] thrush [ ] pharyngeal exudate  Cardiovascular:   [ ] murmur [ x] normal [ ] PPM/AICD  Respiratory:  [x ] clear to ausculation bilaterally  GI:  [x ] soft, non-tender, normal bowel sounds  :  [ ] wagner [ ] no CVA tenderness   Musculoskeletal:  [x ] no synovitis  Neurologic:  [ ] non-focal exam   Skin:  [ ] no rash  Lymph: [ ] no lymphadenopathy  Psychiatric:  [ ] appropriate affect [ ] alert & oriented  Lines:  [ xno phlebitis [ ] central line                                7.9    8.80  )-----------( 101      ( 16 Jul 2021 05:51 )             26.5       07-16    133<L>  |  96<L>  |  23  ----------------------------<  128<H>  3.9   |  24  |  4.99<H>    Ca    7.9<L>      16 Jul 2021 05:51  Mg     2.00     07-15    TPro  5.0<L>  /  Alb  2.3<L>  /  TBili  0.4  /  DBili  x   /  AST  23  /  ALT  <5<L>  /  AlkPhos  89  07-16          MICROBIOLOGY:Culture Results:   No growth to date. (07-12-21 @ 06:12)  Culture Results:   No growth to date. (07-11-21 @ 23:16)      RADIOLOGY:

## 2021-07-16 NOTE — PROGRESS NOTE ADULT - SUBJECTIVE AND OBJECTIVE BOX
CARDIOLOGY ATTENDING    tele - SR    S: remains intubated; ros limited     Review of Systems:   Constitutional: [ ] fevers, [ ] chills.   Skin: [ ] dry skin. [ ] rashes.  Psychiatric: [ ] depression, [ ] anxiety.   Gastrointestinal: [ ] BRBPR, [ ] melena.   Neurological: [ ] confusion. [ ] seizures. [ ] shuffling gait.   Ears,Nose,Mouth and Throat: [ ] ear pain [ ] sore throat.   Eyes: [ ] diplopia.   Respiratory: [ ] hemoptysis. [ ] shortness of breath  Cardiovascular: See HPI above  Hematologic/Lymphatic: [ ] anemia. [ ] painful nodes. [ ] prolonged bleeding.   Genitourinary: [ ] hematuria. [ ] flank pain.   Endocrine: [ ] significant change in weight. [ ] intolerance to heat and cold.     Review of systems [ ] otherwise negative, [x ] otherwise unable to obtain    FH: no family history of sudden cardiac death in first degree relatives    SH: [ ] tobacco, [ ] alcohol, [ ] drugs    acetaminophen   Tablet .. 1000 milliGRAM(s) Oral every 6 hours PRN  aspirin  chewable 81 milliGRAM(s) Oral daily  atorvastatin 40 milliGRAM(s) Oral at bedtime  calamine/zinc oxide Lotion 1 Application(s) Topical three times a day PRN  chlorhexidine 0.12% Liquid 15 milliLiter(s) Oral Mucosa every 12 hours  chlorhexidine 2% Cloths 1 Application(s) Topical daily  dextrose 40% Gel 15 Gram(s) Oral once  dextrose 40% Gel 15 Gram(s) Oral once  dextrose 5%. 1000 milliLiter(s) IV Continuous <Continuous>  dextrose 5%. 1000 milliLiter(s) IV Continuous <Continuous>  dextrose 50% Injectable 25 Gram(s) IV Push once  dextrose 50% Injectable 12.5 Gram(s) IV Push once  dextrose 50% Injectable 25 Gram(s) IV Push once  diltiazem    Tablet 30 milliGRAM(s) Oral four times a day  epoetin danilo-epbx (RETACRIT) Injectable 73824 Unit(s) IV Push <User Schedule>  glucagon  Injectable 1 milliGRAM(s) IntraMuscular once  insulin lispro (ADMELOG) corrective regimen sliding scale   SubCutaneous every 6 hours  norepinephrine Infusion 0.05 MICROgram(s)/kG/Min IV Continuous <Continuous>  pantoprazole  Injectable 40 milliGRAM(s) IV Push every 12 hours                            7.9    8.80  )-----------( 101      ( 16 Jul 2021 05:51 )             26.5       07-16    133<L>  |  96<L>  |  23  ----------------------------<  128<H>  3.9   |  24  |  4.99<H>    Ca    7.9<L>      16 Jul 2021 05:51  Mg     2.00     07-15    TPro  5.0<L>  /  Alb  2.3<L>  /  TBili  0.4  /  DBili  x   /  AST  23  /  ALT  <5<L>  /  AlkPhos  89  07-16      CARDIAC MARKERS ( 14 Jul 2021 03:59 )  x     / x     / 29 U/L / x     / x            T(C): 37.4 (07-16-21 @ 20:00), Max: 37.4 (07-16-21 @ 12:00)  HR: 73 (07-16-21 @ 21:00) (52 - 84)  BP: 142/51 (07-16-21 @ 21:00) (67/34 - 146/51)  RR: 19 (07-16-21 @ 21:00) (14 - 31)  SpO2: 99% (07-16-21 @ 20:00) (91% - 100%)  Wt(kg): --    I&O's Summary    15 Jul 2021 07:01  -  16 Jul 2021 07:00  --------------------------------------------------------  IN: 1266.4 mL / OUT: 900 mL / NET: 366.4 mL    16 Jul 2021 07:01  -  16 Jul 2021 21:43  --------------------------------------------------------  IN: 392.8 mL / OUT: 0 mL / NET: 392.8 mL          General: intubated; sedated   Head: Normocephalic and atraumatic.   Neck: No JVD. No bruits. Supple. Does not appear to be enlarged.   Cardiovascular: + S1,S2 ; RRR Soft systolic murmur at the left lower sternal border. No rubs noted.    Lungs: decreased BS BL  Abdomen: + BS, soft. Non tender. Non distended. No rebound. No guarding.   Extremities: No clubbing/cyanosis/edema.   Neurologic: unable to assess  Skin: Warm and moist. The patient's skin has normal elasticity and good skin turgor.   Psychiatric: unable to assess   Musculoskeletal: unable to assess    DATA  < from: Transthoracic Echocardiogram (07.07.21 @ 18:17) >  ------------------------------------------------------------------------  CONCLUSIONS:  1. Mitral annular calcification, otherwise normal mitral  valve. Minimal mitral regurgitation.  2. Endocardium not well visualized; grossly normal left  ventricular systolic function.  3. Right ventricular enlargement with decreased right  ventricular systolic function.  4. Inadequate tricuspid regurgitation Doppler envelope  precludes estimation of RVSP.  ------------------------------------------------------------------------  Confirmed on  7/7/2021 - 21:16:20 by Osvaldo Bertrand M.D.,  [facility], FASE    < end of copied text >        A/P) 71 y/o male PMH CAD s/p CABG and old PCI, AFL x 1 year on coumadin, HTN, hyperlipidemia, DM, ESRD on HD, and hypothyroidism originally admitted to [facility] with GIB, transferred to [facility], found to have septic shock and MRSA bacteremia.     -Events noted; pt. s/p PEA arrest   -Repeat TTE with only mild segmental LV dysfunction in the inferoseptum  -Given negative CPK, do not suspect acs  -Suspect elevated troponin in the setting of demand ischemia from underlying septic shock  -Pressor support per MICU  -EP follow up appreciated   -Given MRSA bacteremia, check TRUMAN to rule out endocarditis if family agreeable - appreciate GI eval - cleared by GI for TRUMAN    -Abx per ID  -CTH head noted - pt. with cva - ac was initially held due to GIB and acute blood loss anemia at [facility] resulting in a 6 point drop in Hb - appreciate GI eval - cleared by GI to resume AC  -Follow up neuro   -Supportive care per MICU     Kalie Lau MD

## 2021-07-16 NOTE — PROGRESS NOTE ADULT - ASSESSMENT
69 yo M with PMhx of CAD s/p CABG (course at that time complicated by pleural effusion requiring chest tubes), ESRD on HD (MWF), DMII, HTN, afib on coumadin, and anemia transferred from [facility] for evaluation of chest pain after HD and GIB s/p 2U PRBC transfusion and aflutter/afib with RVR managed with metoprolol. Admitted to MICU for hypotension dependent on pressors likely 2/2 to sepsis.    #Neuro  - Off of sedation. Patient opens eyes on command and moves his toes. He is not able move his fingers or   - CT head shows foci of low density in the R anterolateral frontal lobe and L posterior temporal lobe concerning for acute infarcts.  - Mechanism likely cardioembolic in setting of patient with known atrial fibrillation with cardiac arrest vs septic emboli  - per neurology, can consider MRI brain w/o contrast. However, unlikely to  since patient has known atrial fibrillation and will need to be continued of full dose anticoagulation.  - Avoid hypotension, keep -180 for neuroprotection  - c/w Atorvastatin 40MG QHS for secondary stroke prevention.    #Cardiovascular  - Cardiac arrest: Patient had asystole night of 7/13 and CPR was started. S/p epi x3 and shock x2 with return to NSR. Cards is following  - TTE on 7/13 showed Mild mitral regurgitation with biventricular dysfunction of LV and RV systolic function. Endocardium was not well visualized, Hypokinesis of the basal inferior wall and basal inferoseptum.  - TRUMAN was scheduled for today but patient had elevated ptt on morning labs. Labs were repeated and they were still elevated. Per cardiology, patient will get TRUMAN monday morning.   - c/w Levophed wean as tolerated.   - BP goal 140-180 to maintain cerebral perfusion after stroke  - Will consider switching to midodrine once HR stable  - Troponins 2400, CKMB 7.1. Troponin stable from prior to cardiac arrest. They are likely 2/2 chronic condition/ESRD on HD vs Hypotensive event. Will continue to trend and monitor.    A. Fib  - on coumadin at home,  - c/ heparin gtt prophylaxis. Holding right now as PTT >200 and awaiting TRUMAN  - From neurology stand point, heparin is sufficient for now. Will need to be switched to DOAC in the future if kidney function allows  - 7/11: tried to transition heparin gtt to eliquis but pt is not a candidate due to his acute MIGUEL; back on heparin gtt  - c/ diltiazem for rate control    #Respiratory  - Loculated Pleural effusions: chest tube removed   - pleural fluid cx: (-); transudative.   - intubated and on ventilator (24/400/5/40). F/u ABG and adjust as necessary  - CXR s/p chest tube removal showed no evidence of atelectasis. Demonstrated loculated left pleural effusion decreased in size and new hazy right lower lung opacity which could be due to a small layering right pleural effusion with associated passive atelectasis, atelectasis of other cause, and/or pneumonia.  - abx as below    #GI/Nutrition:  - NPO with tube feed. Going to restart tube feed as TRUMAN now on monday  - Was c/o abdominal pain on presentation, bedside POCUS didn't show ascites. No abdominal pain at this time. Continue to monitor.   - negative occult bleeding, hgb stable  - Per GI, patient is stable to get TRUMAN and does not need endoscopic evaluation prior to TRUMAN.   - Zofran PRN for Nausea.    #/Renal  - ESRD: HD on MWF. Most recent Scr 6.40. Potassium is stable.   - Last dialysis session 7/15. Per nephro, will not receive dialysis again today  - c/w Epo 20k tiw with HD      #Skin  - HD Access: fistula in left upper arm  - Improving erythematous and tender on right wrist. Continue to monitor. RUE US neg for thrombus  - US of fistula showed patent fistula with no abscess      #ID  - S aureus PCR: (+),  MRSA PCR negative  - BCx on 7/7 now growing MRSA: restarted on vanc (7/11- )  - Vanc level on 7/13 was 24.8. f/u level today  - Vanco today was 27. Will not give vanco today. Will get a level tomorrow  - Surveillance blood Cx have been negative for 48hours, no longer need to continue    #Endocrine  - DMII: c/w Lispro SS. Monitor glucose.      #Hematologic/DVT ppx  - Heparin gtt. On hold as PTT >200      #Ethics  - full code as per wife- full code as per wife

## 2021-07-16 NOTE — PROGRESS NOTE ADULT - SUBJECTIVE AND OBJECTIVE BOX
Nephrology Followup Note - [phone] - Dr Mendiola / Dr Knight / Dr Macias / Dr Arroyo / Dr Mackey / Dr Bowen / Dr Esposito / Dr Calle  Pt seen and examined at bedside  No acute events overnight. Pt intubated, on pressor support   His wife and son at bedside.     Allergies:  lisinopril (Other)  opioid-like analgesics (Other)    Hospital Medications:   MEDICATIONS  (STANDING):  aspirin  chewable 81 milliGRAM(s) Oral daily  atorvastatin 40 milliGRAM(s) Oral at bedtime  chlorhexidine 0.12% Liquid 15 milliLiter(s) Oral Mucosa every 12 hours  chlorhexidine 2% Cloths 1 Application(s) Topical daily  dextrose 40% Gel 15 Gram(s) Oral once  dextrose 40% Gel 15 Gram(s) Oral once  dextrose 5%. 1000 milliLiter(s) (50 mL/Hr) IV Continuous <Continuous>  dextrose 5%. 1000 milliLiter(s) (100 mL/Hr) IV Continuous <Continuous>  dextrose 50% Injectable 25 Gram(s) IV Push once  dextrose 50% Injectable 12.5 Gram(s) IV Push once  dextrose 50% Injectable 25 Gram(s) IV Push once  diltiazem    Tablet 30 milliGRAM(s) Oral four times a day  epoetin danilo-epbx (RETACRIT) Injectable 46436 Unit(s) IV Push <User Schedule>  glucagon  Injectable 1 milliGRAM(s) IntraMuscular once  insulin lispro (ADMELOG) corrective regimen sliding scale   SubCutaneous every 6 hours  norepinephrine Infusion 0.05 MICROgram(s)/kG/Min (4.04 mL/Hr) IV Continuous <Continuous>  pantoprazole  Injectable 40 milliGRAM(s) IV Push every 12 hours    VITALS:  T(F): 98.6 (07-16-21 @ 16:00), Max: 99.4 (07-16-21 @ 12:00)  HR: 64 (07-16-21 @ 16:05)  BP: 139/39 (07-16-21 @ 16:00)  RR: 31 (07-16-21 @ 16:00)  SpO2: 99% (07-16-21 @ 16:05)  Wt(kg): --    07-15 @ 07:01  -  07-16 @ 07:00  --------------------------------------------------------  IN: 1266.4 mL / OUT: 900 mL / NET: 366.4 mL    07-16 @ 07:01  -  07-16 @ 17:42  --------------------------------------------------------  IN: 84 mL / OUT: 0 mL / NET: 84 mL        PHYSICAL EXAM:  Constitutional: NAD  HEENT: ETT  Neck: No JVD  Respiratory: CTAB, no wheezes, rales or rhonchi  Cardiovascular: S1, S2, RRR  Gastrointestinal: BS+, soft, NT/ND  Extremities: No cyanosis or clubbing. No peripheral edema  Neurological: lethargic  : No CVA tenderness. No wagner.   Skin: No rashes  Vascular Access: LUE AV access +thrill and bruit.     LABS:  07-16    133<L>  |  96<L>  |  23  ----------------------------<  128<H>  3.9   |  24  |  4.99<H>    Ca    7.9<L>      16 Jul 2021 05:51  Mg     2.00     07-15    TPro  5.0<L>  /  Alb  2.3<L>  /  TBili  0.4  /  DBili      /  AST  23  /  ALT  <5<L>  /  AlkPhos  89  07-16    Creatinine Trend: 4.99 <--, 6.40 <--, 5.18 <--, 6.40 <--, 6.05 <--, 8.49 <--, 8.56 <--, 7.83 <--, 5.72 <--                        7.9    8.80  )-----------( 101      ( 16 Jul 2021 05:51 )             26.5     Urine Studies:      RADIOLOGY & ADDITIONAL STUDIES:

## 2021-07-16 NOTE — PROGRESS NOTE ADULT - ASSESSMENT
70 year old male with possible GI bleed     Constipation   Recommend starting bowel regimen as below   Senna qhs and titrate Miralax qd to bid as needed   Suppository vs Enema as needed     Anemia  negative occult w/o overt gi bleeding  Monitor CBC and Keep hemoglobin > 8 given cardiac issues   PPI BID w/Maalox PRN    Monitor stool color  No GI objection to ASA or Heparin gtt while continuing ppi therapy  No GI objection to proceeding w/TRUMAN (H/H stable, negative occult, brown stools)    Ascites  2/2 RHF w/dilated IVC on CT   diuresis per cardiology     Cardiac Arrest  Care per cardiology and MICU appreciated     Pleural Effusion:  care per CTSx appreciated     Advanced care planning was discussed with patient.  Advanced care planning forms were reviewed and discussed.  Risks, benefits and alternatives of gastroenterologic procedures were discussed in detail and all questions were answered.  30 minutes spent.

## 2021-07-16 NOTE — PROGRESS NOTE ADULT - ASSESSMENT
70 year old with ESRD, DM, CAD presented with anemia and chest pain    Recently diagnosed Staph aureus bacteremia  Course complicated by arrest, imaging with ? CVA    1)Staph bacteremia- ? phlebitis as focus  -prelim results suggested MSSA  now identified as MRSA-     Dose Vancomycin based on daily level     Repeat blood cultures 7/9 with growth in 2 of 4 bottles  Repeat blood culture 7/11 and 7/12 without growth    Check TRUMAN  Consider imaging LS spine when more stable    LUE HD access appears to be a fistula but history states graft- consider US  to confirm    2) Fever  Likely due to bacteremia  Monitor    3) Pleural effusion  S/p thoracentesis- appears transudative     4) Leukocytosis  Continue to monitor     Call the ID service with questions or concerns over the weekend.  [phone]

## 2021-07-16 NOTE — CHART NOTE - NSCHARTNOTEFT_GEN_A_CORE
: Valencia Sanchez    INDICATION: PNA, pleural effusions     PROCEDURE:  [ ] LIMITED ECHO  [x ] LIMITED CHEST  [ ] LIMITED RETROPERITONEAL  [ ] LIMITED ABDOMINAL  [ ] LIMITED DVT  [ ] NEEDLE GUIDANCE VASCULAR  [ ] NEEDLE GUIDANCE THORACENTESIS  [ ] NEEDLE GUIDANCE PARACENTESIS  [ ] NEEDLE GUIDANCE PERICARDIOCENTESIS  [ ] OTHER    FINDINGS:  Bilateral lower lobe B line predominance, with small  bilateral pleural effusions. LLB consolidation    INTERPRETATION: Bilateral lower lobe B line predominance, with small  bilateral pleural effusions. LLB consolidation    Images stored on [employer]path.      Valencia Sanchez, PGY-2 : Valencia Sanchez    INDICATION: PNA, pleural effusions, acute hypoxemic respiratory failure     PROCEDURE:  [ ] LIMITED ECHO  [x ] LIMITED CHEST  [ ] LIMITED RETROPERITONEAL  [ ] LIMITED ABDOMINAL  [ ] LIMITED DVT  [ ] NEEDLE GUIDANCE VASCULAR  [ ] NEEDLE GUIDANCE THORACENTESIS  [ ] NEEDLE GUIDANCE PARACENTESIS  [ ] NEEDLE GUIDANCE PERICARDIOCENTESIS  [ ] OTHER    FINDINGS:  Bilateral lower lobe B line predominance, with small  bilateral pleural effusions. LLB consolidation    INTERPRETATION: Bilateral lower lobe B line predominance, with small  bilateral pleural effusions. LLB consolidation    Images stored on Qpath.      Valencia Sanchez, PGY-2    Attending Attestation:  I was present during the key portions of the procedure and immediately available during the entire procedure.  Moraima Monroy MD  Attending  Pulmonary & Critical Care Medicine

## 2021-07-16 NOTE — PROGRESS NOTE ADULT - ATTENDING COMMENTS
70 M with CABG, ESRD on HD, known history of L pleural effusion, afib on a/c here with chest pain and concern for GIB, now with septic shock due to MRSA bacteremia requiring pressors, c/b cardiac arrest after ?Gm/vagal episode with ROSC, now acute hypoxemic respiratory failure due to likely aspiration pneumonitis.    possible TRUMAN today given concern for endocarditis, surveillance cultures negative thus far  will need to clarify if he will get HD today, may need to redose vanco after checking level  h/h stable  wean sedation, try to assess mental status, start PS trials    d/w son at length on phone

## 2021-07-17 LAB
ALBUMIN SERPL ELPH-MCNC: 2.7 G/DL — LOW (ref 3.3–5)
ALP SERPL-CCNC: 110 U/L — SIGNIFICANT CHANGE UP (ref 40–120)
ALT FLD-CCNC: 6 U/L — SIGNIFICANT CHANGE UP (ref 4–41)
ANION GAP SERPL CALC-SCNC: 17 MMOL/L — HIGH (ref 7–14)
APTT BLD: 31.1 SEC — SIGNIFICANT CHANGE UP (ref 27–36.3)
APTT BLD: 33.7 SEC — SIGNIFICANT CHANGE UP (ref 27–36.3)
APTT BLD: 34.2 SEC — SIGNIFICANT CHANGE UP (ref 27–36.3)
AST SERPL-CCNC: 25 U/L — SIGNIFICANT CHANGE UP (ref 4–40)
BASOPHILS # BLD AUTO: 0.05 K/UL — SIGNIFICANT CHANGE UP (ref 0–0.2)
BASOPHILS NFR BLD AUTO: 0.4 % — SIGNIFICANT CHANGE UP (ref 0–2)
BILIRUB SERPL-MCNC: 0.5 MG/DL — SIGNIFICANT CHANGE UP (ref 0.2–1.2)
BUN SERPL-MCNC: 38 MG/DL — HIGH (ref 7–23)
CALCIUM SERPL-MCNC: 8.8 MG/DL — SIGNIFICANT CHANGE UP (ref 8.4–10.5)
CHLORIDE SERPL-SCNC: 94 MMOL/L — LOW (ref 98–107)
CO2 SERPL-SCNC: 22 MMOL/L — SIGNIFICANT CHANGE UP (ref 22–31)
CREAT SERPL-MCNC: 6.22 MG/DL — HIGH (ref 0.5–1.3)
CULTURE RESULTS: SIGNIFICANT CHANGE UP
CULTURE RESULTS: SIGNIFICANT CHANGE UP
EOSINOPHIL # BLD AUTO: 0.05 K/UL — SIGNIFICANT CHANGE UP (ref 0–0.5)
EOSINOPHIL NFR BLD AUTO: 0.4 % — SIGNIFICANT CHANGE UP (ref 0–6)
GLUCOSE BLDC GLUCOMTR-MCNC: 183 MG/DL — HIGH (ref 70–99)
GLUCOSE BLDC GLUCOMTR-MCNC: 204 MG/DL — HIGH (ref 70–99)
GLUCOSE BLDC GLUCOMTR-MCNC: 213 MG/DL — HIGH (ref 70–99)
GLUCOSE BLDC GLUCOMTR-MCNC: 216 MG/DL — HIGH (ref 70–99)
GLUCOSE BLDC GLUCOMTR-MCNC: 235 MG/DL — HIGH (ref 70–99)
GLUCOSE SERPL-MCNC: 213 MG/DL — HIGH (ref 70–99)
HCT VFR BLD CALC: 31.1 % — LOW (ref 39–50)
HGB BLD-MCNC: 9 G/DL — LOW (ref 13–17)
IANC: 11.39 K/UL — HIGH (ref 1.5–8.5)
IMM GRANULOCYTES NFR BLD AUTO: 1.8 % — HIGH (ref 0–1.5)
LYMPHOCYTES # BLD AUTO: 0.64 K/UL — LOW (ref 1–3.3)
LYMPHOCYTES # BLD AUTO: 4.7 % — LOW (ref 13–44)
MCHC RBC-ENTMCNC: 27.9 PG — SIGNIFICANT CHANGE UP (ref 27–34)
MCHC RBC-ENTMCNC: 28.9 GM/DL — LOW (ref 32–36)
MCV RBC AUTO: 96.3 FL — SIGNIFICANT CHANGE UP (ref 80–100)
MONOCYTES # BLD AUTO: 1.35 K/UL — HIGH (ref 0–0.9)
MONOCYTES NFR BLD AUTO: 9.8 % — SIGNIFICANT CHANGE UP (ref 2–14)
NEUTROPHILS # BLD AUTO: 11.39 K/UL — HIGH (ref 1.8–7.4)
NEUTROPHILS NFR BLD AUTO: 82.9 % — HIGH (ref 43–77)
NRBC # BLD: 0 /100 WBCS — SIGNIFICANT CHANGE UP
NRBC # FLD: 0.03 K/UL — HIGH
PLATELET # BLD AUTO: 163 K/UL — SIGNIFICANT CHANGE UP (ref 150–400)
POTASSIUM SERPL-MCNC: 4.3 MMOL/L — SIGNIFICANT CHANGE UP (ref 3.5–5.3)
POTASSIUM SERPL-SCNC: 4.3 MMOL/L — SIGNIFICANT CHANGE UP (ref 3.5–5.3)
PROT SERPL-MCNC: 5.8 G/DL — LOW (ref 6–8.3)
RBC # BLD: 3.23 M/UL — LOW (ref 4.2–5.8)
RBC # FLD: 18.9 % — HIGH (ref 10.3–14.5)
SODIUM SERPL-SCNC: 133 MMOL/L — LOW (ref 135–145)
SPECIMEN SOURCE: SIGNIFICANT CHANGE UP
SPECIMEN SOURCE: SIGNIFICANT CHANGE UP
VANCOMYCIN FLD-MCNC: 22.1 UG/ML — SIGNIFICANT CHANGE UP
WBC # BLD: 13.73 K/UL — HIGH (ref 3.8–10.5)
WBC # FLD AUTO: 13.73 K/UL — HIGH (ref 3.8–10.5)

## 2021-07-17 PROCEDURE — 99291 CRITICAL CARE FIRST HOUR: CPT

## 2021-07-17 RX ORDER — HEPARIN SODIUM 5000 [USP'U]/ML
3500 INJECTION INTRAVENOUS; SUBCUTANEOUS EVERY 6 HOURS
Refills: 0 | Status: DISCONTINUED | OUTPATIENT
Start: 2021-07-17 | End: 2021-07-19

## 2021-07-17 RX ORDER — HEPARIN SODIUM 5000 [USP'U]/ML
7000 INJECTION INTRAVENOUS; SUBCUTANEOUS EVERY 6 HOURS
Refills: 0 | Status: DISCONTINUED | OUTPATIENT
Start: 2021-07-17 | End: 2021-07-19

## 2021-07-17 RX ORDER — HEPARIN SODIUM 5000 [USP'U]/ML
700 INJECTION INTRAVENOUS; SUBCUTANEOUS
Qty: 25000 | Refills: 0 | Status: DISCONTINUED | OUTPATIENT
Start: 2021-07-17 | End: 2021-07-19

## 2021-07-17 RX ORDER — ERYTHROPOIETIN 10000 [IU]/ML
20000 INJECTION, SOLUTION INTRAVENOUS; SUBCUTANEOUS
Refills: 0 | Status: DISCONTINUED | OUTPATIENT
Start: 2021-07-17 | End: 2021-07-30

## 2021-07-17 RX ADMIN — HEPARIN SODIUM 7000 UNIT(S): 5000 INJECTION INTRAVENOUS; SUBCUTANEOUS at 21:40

## 2021-07-17 RX ADMIN — Medication 81 MILLIGRAM(S): at 11:04

## 2021-07-17 RX ADMIN — Medication 2: at 06:25

## 2021-07-17 RX ADMIN — HEPARIN SODIUM 700 UNIT(S)/HR: 5000 INJECTION INTRAVENOUS; SUBCUTANEOUS at 01:33

## 2021-07-17 RX ADMIN — Medication 2: at 11:40

## 2021-07-17 RX ADMIN — CHLORHEXIDINE GLUCONATE 15 MILLILITER(S): 213 SOLUTION TOPICAL at 18:12

## 2021-07-17 RX ADMIN — Medication 30 MILLIGRAM(S): at 06:16

## 2021-07-17 RX ADMIN — PANTOPRAZOLE SODIUM 40 MILLIGRAM(S): 20 TABLET, DELAYED RELEASE ORAL at 06:16

## 2021-07-17 RX ADMIN — Medication 2: at 23:54

## 2021-07-17 RX ADMIN — CALAMINE AND ZINC OXIDE AND PHENOL 1 APPLICATION(S): 160; 10 LOTION TOPICAL at 23:54

## 2021-07-17 RX ADMIN — Medication 1: at 18:13

## 2021-07-17 RX ADMIN — HEPARIN SODIUM 1100 UNIT(S)/HR: 5000 INJECTION INTRAVENOUS; SUBCUTANEOUS at 06:53

## 2021-07-17 RX ADMIN — PANTOPRAZOLE SODIUM 40 MILLIGRAM(S): 20 TABLET, DELAYED RELEASE ORAL at 18:11

## 2021-07-17 RX ADMIN — ATORVASTATIN CALCIUM 40 MILLIGRAM(S): 80 TABLET, FILM COATED ORAL at 21:38

## 2021-07-17 RX ADMIN — Medication 2: at 00:31

## 2021-07-17 RX ADMIN — CHLORHEXIDINE GLUCONATE 1 APPLICATION(S): 213 SOLUTION TOPICAL at 11:01

## 2021-07-17 RX ADMIN — ERYTHROPOIETIN 20000 UNIT(S): 10000 INJECTION, SOLUTION INTRAVENOUS; SUBCUTANEOUS at 19:34

## 2021-07-17 RX ADMIN — Medication 30 MILLIGRAM(S): at 23:54

## 2021-07-17 RX ADMIN — Medication 4.04 MICROGRAM(S)/KG/MIN: at 20:15

## 2021-07-17 RX ADMIN — Medication 30 MILLIGRAM(S): at 11:04

## 2021-07-17 RX ADMIN — CHLORHEXIDINE GLUCONATE 15 MILLILITER(S): 213 SOLUTION TOPICAL at 06:16

## 2021-07-17 RX ADMIN — Medication 30 MILLIGRAM(S): at 18:11

## 2021-07-17 RX ADMIN — HEPARIN SODIUM 1900 UNIT(S)/HR: 5000 INJECTION INTRAVENOUS; SUBCUTANEOUS at 21:33

## 2021-07-17 RX ADMIN — Medication 4.04 MICROGRAM(S)/KG/MIN: at 07:47

## 2021-07-17 RX ADMIN — HEPARIN SODIUM 1500 UNIT(S)/HR: 5000 INJECTION INTRAVENOUS; SUBCUTANEOUS at 14:35

## 2021-07-17 NOTE — PROGRESS NOTE ADULT - ASSESSMENT
EP ATTENDING    Agree with above. No indication for PPM at this time. Recommend lifelong a/c for PAF if/when ok with GI and MICU.

## 2021-07-17 NOTE — PROGRESS NOTE ADULT - SUBJECTIVE AND OBJECTIVE BOX
Date of Service: 07-17-21 @ 14:19    Patient is a 70y old  Male who presents with a chief complaint of Chest pain and GIB (17 Jul 2021 12:51)      Any change in ROS: Intubated and sedated : getting dialysis    MEDICATIONS  (STANDING):  aspirin  chewable 81 milliGRAM(s) Oral daily  atorvastatin 40 milliGRAM(s) Oral at bedtime  chlorhexidine 0.12% Liquid 15 milliLiter(s) Oral Mucosa every 12 hours  chlorhexidine 2% Cloths 1 Application(s) Topical daily  dextrose 40% Gel 15 Gram(s) Oral once  dextrose 40% Gel 15 Gram(s) Oral once  dextrose 5%. 1000 milliLiter(s) (50 mL/Hr) IV Continuous <Continuous>  dextrose 5%. 1000 milliLiter(s) (100 mL/Hr) IV Continuous <Continuous>  dextrose 50% Injectable 25 Gram(s) IV Push once  dextrose 50% Injectable 12.5 Gram(s) IV Push once  dextrose 50% Injectable 25 Gram(s) IV Push once  diltiazem    Tablet 30 milliGRAM(s) Oral four times a day  epoetin danilo-epbx (RETACRIT) Injectable 60442 Unit(s) IV Push <User Schedule>  glucagon  Injectable 1 milliGRAM(s) IntraMuscular once  heparin  Infusion. 700 Unit(s)/Hr (7 mL/Hr) IV Continuous <Continuous>  insulin lispro (ADMELOG) corrective regimen sliding scale   SubCutaneous every 6 hours  norepinephrine Infusion 0.05 MICROgram(s)/kG/Min (4.04 mL/Hr) IV Continuous <Continuous>  pantoprazole  Injectable 40 milliGRAM(s) IV Push every 12 hours    MEDICATIONS  (PRN):  acetaminophen   Tablet .. 1000 milliGRAM(s) Oral every 6 hours PRN Temp greater or equal to 38C (100.4F), Mild Pain (1 - 3), Moderate Pain (4 - 6)  calamine/zinc oxide Lotion 1 Application(s) Topical three times a day PRN Itching  heparin   Injectable 7000 Unit(s) IV Push every 6 hours PRN For aPTT less than 40  heparin   Injectable 3500 Unit(s) IV Push every 6 hours PRN For aPTT between 40 - 57    Vital Signs Last 24 Hrs  T(C): 36.7 (17 Jul 2021 12:00), Max: 37.4 (16 Jul 2021 20:00)  T(F): 98 (17 Jul 2021 12:00), Max: 99.3 (16 Jul 2021 20:00)  HR: 79 (17 Jul 2021 12:00) (64 - 84)  BP: 135/39 (17 Jul 2021 12:00) (114/37 - 175/60)  BP(mean): 62 (17 Jul 2021 12:00) (54 - 89)  RR: 15 (17 Jul 2021 12:00) (14 - 31)  SpO2: 100% (17 Jul 2021 12:00) (95% - 100%)  Mode: AC/ CMV (Assist Control/ Continuous Mandatory Ventilation)  RR (machine): 14  TV (machine): 400  FiO2: 40  PEEP: 5  ITime: 0.8  MAP: 10  PIP: 26    I&O's Summary    16 Jul 2021 07:01  -  17 Jul 2021 07:00  --------------------------------------------------------  IN: 873.8 mL / OUT: 0 mL / NET: 873.8 mL    17 Jul 2021 07:01  -  17 Jul 2021 14:19  --------------------------------------------------------  IN: 89 mL / OUT: 0 mL / NET: 89 mL          Physical Exam:   GENERAL: NAD, well-groomed, well-developed  HEENT: KATHY/   Atraumatic, Normocephalic  ENMT: No tonsillar erythema, exudates, or enlargement; Moist mucous membranes, Good dentition, No lesions  NECK: Supple, No JVD, Normal thyroid  CHEST/LUNG: Clear to auscultaion, ; No rales, rhonchi, wheezing, or rubs  CVS: Regular rate and rhythm; No murmurs, rubs, or gallops  GI: : Soft, Nontender, Nondistended; Bowel sounds present  NERVOUS SYSTEM:  Intuabted and on pressors  EXTREMITIES:-yonathan  LYMPH: No lymphadenopathy noted  SKIN: No rashes or lesions  ENDOCRINOLOGY: No Thyromegaly  PSYCH:Intuabted    Labs:  ABG - ( 16 Jul 2021 03:14 )  pH, Arterial: 7.51  pH, Blood: x     /  pCO2: 35    /  pO2: 98    / HCO3: 29    / Base Excess: 4.6   /  SaO2: 98.2                                        9.0    13.73 )-----------( 163      ( 17 Jul 2021 06:27 )             31.1                         7.9    8.80  )-----------( 101      ( 16 Jul 2021 05:51 )             26.5                         8.2    10.98 )-----------( 162      ( 15 Jul 2021 04:24 )             26.9                         9.0    14.22 )-----------( 226      ( 14 Jul 2021 03:59 )             29.2     07-17    133<L>  |  94<L>  |  38<H>  ----------------------------<  213<H>  4.3   |  22  |  6.22<H>  07-16    133<L>  |  96<L>  |  23  ----------------------------<  128<H>  3.9   |  24  |  4.99<H>  07-15    135  |  96<L>  |  28<H>  ----------------------------<  173<H>  3.3<L>   |  20<L>  |  6.40<H>  07-14    132<L>  |  92<L>  |  20  ----------------------------<  159<H>  3.5   |  18<L>  |  5.18<H>    Ca    8.8      17 Jul 2021 06:27  Ca    7.9<L>      16 Jul 2021 05:51    TPro  5.8<L>  /  Alb  2.7<L>  /  TBili  0.5  /  DBili  x   /  AST  25  /  ALT  6   /  AlkPhos  110  07-17  TPro  5.0<L>  /  Alb  2.3<L>  /  TBili  0.4  /  DBili  x   /  AST  23  /  ALT  <5<L>  /  AlkPhos  89  07-16  TPro  4.6<L>  /  Alb  2.1<L>  /  TBili  0.4  /  DBili  x   /  AST  17  /  ALT  <5<L>  /  AlkPhos  89  07-15  TPro  4.7<L>  /  Alb  2.5<L>  /  TBili  0.5  /  DBili  x   /  AST  21  /  ALT  <5<L>  /  AlkPhos  82  07-14    CAPILLARY BLOOD GLUCOSE      POCT Blood Glucose.: 204 mg/dL (17 Jul 2021 11:32)  POCT Blood Glucose.: 213 mg/dL (17 Jul 2021 06:24)  POCT Blood Glucose.: 216 mg/dL (17 Jul 2021 00:29)  POCT Blood Glucose.: 142 mg/dL (16 Jul 2021 17:33)      LIVER FUNCTIONS - ( 17 Jul 2021 06:27 )  Alb: 2.7 g/dL / Pro: 5.8 g/dL / ALK PHOS: 110 U/L / ALT: 6 U/L / AST: 25 U/L / GGT: x           PT/INR - ( 16 Jul 2021 11:16 )   PT: 16.4 sec;   INR: 1.46 ratio         PTT - ( 17 Jul 2021 12:52 )  PTT:34.2 sec          RECENT CULTURES:  07-12 @ 00:30 .Blood Blood                No Growth Final    07-11 @ 21:09 .Blood Blood-Peripheral     ra< from: CT Chest w/ IV Cont (07.13.21 @ 18:36) >    BOWEL: No bowel obstruction. Appendix is normal. Enteric tube is present with the tip in stomach.  PERITONEUM: Mild to moderate ascites in the perihepatic and perisplenic regions, tracking along the paracolic gutters. Trace ascites in the pelvis.  VESSELS: Atherosclerotic changes.  RETROPERITONEUM/LYMPH NODES: No lymphadenopathy.  ABDOMINAL WALL: Diffuse subcutaneous edema.  BONES: Degenerative changes.    IMPRESSION:  *  Mild pulmonary edema with bilateral partially loculated pleural effusions with adjacent passive atelectasis.  *  Debris within the right mainstem with associated partial atelectasis of the right lower lobe.  *  Mild to moderate ascites.    --- End of Report ---      < from: CT Abdomen and Pelvis w/ IV Cont (07.13.21 @ 18:37) >  AL WALL: Diffuse subcutaneous edema.  BONES: Degenerative changes.    IMPRESSION:  *  Mild pulmonary edema with bilateral partially loculated pleural effusions with adjacent passive atelectasis.  *  Debris within the right mainstem with associated partial atelectasis of the right lower lobe.  *  Mild to moderate ascites.    --- End of Report ---            TRU WEBER MD; Resident Radiology  This document has been electronically signed.  MIKE NOLAN MD; Attending Radiologist  This document has been electronically signed. Jul 14 2021  9:42AM    < end of copied text >        TRU WEBER MD; Resident Radiology  This document has been electronically signed.  MIKE NOLAN MD; Attending Radiologist  This document has been electronically signed. Jul 14 2021  9:42AM    < end of copied text >             No Growth Final          RESPIRATORY CULTURES:          Studies  Chest X-RAY  CT SCAN Chest   Venous Dopplers: LE:   CT Abdomen  Others

## 2021-07-17 NOTE — PROGRESS NOTE ADULT - SUBJECTIVE AND OBJECTIVE BOX
Mainor Bowers  PGY-1 | Internal Medicine      INTERVAL HPI/OVERNIGHT EVENTS: No acute events O/N.    SUBJECTIVE: Patient seen and examined at bedside. Patient is intubated, off of sedation    ROS: Unable to assess    OBJECTIVE:    VITAL SIGNS:  ICU Vital Signs Last 24 Hrs  T(C): 37 (17 Jul 2021 06:00), Max: 37.4 (16 Jul 2021 12:00)  T(F): 98.6 (17 Jul 2021 06:00), Max: 99.4 (16 Jul 2021 12:00)  HR: 74 (17 Jul 2021 07:47) (52 - 84)  BP: 137/49 (17 Jul 2021 07:00) (67/34 - 175/60)  BP(mean): 69 (17 Jul 2021 07:00) (40 - 89)  RR: 20 (17 Jul 2021 07:00) (14 - 31)  SpO2: 99% (17 Jul 2021 07:47) (91% - 100%)    Mode: AC/ CMV (Assist Control/ Continuous Mandatory Ventilation), RR (machine): 14, TV (machine): 400, FiO2: 40, PEEP: 5, ITime: 0.8, MAP: 10, PIP: 29    07-16 @ 07:01  -  07-17 @ 07:00  --------------------------------------------------------  IN: 873.8 mL / OUT: 0 mL / NET: 873.8 mL      CAPILLARY BLOOD GLUCOSE      POCT Blood Glucose.: 213 mg/dL (17 Jul 2021 06:24)      PHYSICAL EXAM:    General: NAD  HEENT: NC/AT; PERRL, clear conjunctiva  Neck: supple  Respiratory: CTA b/l  Cardiovascular: +S1/S2; RRR  Abdomen: soft, NT/ND; +BS x4  Extremities: WWP, 2+ peripheral pulses b/l; no LE edema  Skin: normal color and turgor; no rash  Neurological:    MEDICATIONS:  MEDICATIONS  (STANDING):  aspirin  chewable 81 milliGRAM(s) Oral daily  atorvastatin 40 milliGRAM(s) Oral at bedtime  chlorhexidine 0.12% Liquid 15 milliLiter(s) Oral Mucosa every 12 hours  chlorhexidine 2% Cloths 1 Application(s) Topical daily  dextrose 40% Gel 15 Gram(s) Oral once  dextrose 40% Gel 15 Gram(s) Oral once  dextrose 5%. 1000 milliLiter(s) (50 mL/Hr) IV Continuous <Continuous>  dextrose 5%. 1000 milliLiter(s) (100 mL/Hr) IV Continuous <Continuous>  dextrose 50% Injectable 25 Gram(s) IV Push once  dextrose 50% Injectable 12.5 Gram(s) IV Push once  dextrose 50% Injectable 25 Gram(s) IV Push once  diltiazem    Tablet 30 milliGRAM(s) Oral four times a day  epoetin danilo-epbx (RETACRIT) Injectable 55306 Unit(s) IV Push <User Schedule>  glucagon  Injectable 1 milliGRAM(s) IntraMuscular once  heparin  Infusion. 700 Unit(s)/Hr (7 mL/Hr) IV Continuous <Continuous>  insulin lispro (ADMELOG) corrective regimen sliding scale   SubCutaneous every 6 hours  norepinephrine Infusion 0.05 MICROgram(s)/kG/Min (4.04 mL/Hr) IV Continuous <Continuous>  pantoprazole  Injectable 40 milliGRAM(s) IV Push every 12 hours    MEDICATIONS  (PRN):  acetaminophen   Tablet .. 1000 milliGRAM(s) Oral every 6 hours PRN Temp greater or equal to 38C (100.4F), Mild Pain (1 - 3), Moderate Pain (4 - 6)  calamine/zinc oxide Lotion 1 Application(s) Topical three times a day PRN Itching  heparin   Injectable 7000 Unit(s) IV Push every 6 hours PRN For aPTT less than 40  heparin   Injectable 3500 Unit(s) IV Push every 6 hours PRN For aPTT between 40 - 57      ALLERGIES:  Allergies    lisinopril (Other)  opioid-like analgesics (Other)    Intolerances        LABS:                        9.0    13.73 )-----------( 163      ( 17 Jul 2021 06:27 )             31.1     07-17    133<L>  |  94<L>  |  38<H>  ----------------------------<  213<H>  4.3   |  22  |  6.22<H>    Ca    8.8      17 Jul 2021 06:27    TPro  5.8<L>  /  Alb  2.7<L>  /  TBili  0.5  /  DBili  x   /  AST  25  /  ALT  6   /  AlkPhos  110  07-17    PT/INR - ( 16 Jul 2021 11:16 )   PT: 16.4 sec;   INR: 1.46 ratio         PTT - ( 17 Jul 2021 06:27 )  PTT:31.1 sec      RADIOLOGY & ADDITIONAL TESTS: Reviewed. Mainor Bowers  PGY-1 | Internal Medicine      INTERVAL HPI/OVERNIGHT EVENTS: No acute events O/N.    SUBJECTIVE: Patient seen and examined at bedside. Patient is intubated, off of sedation    ROS: Unable to assess    OBJECTIVE:    VITAL SIGNS:  ICU Vital Signs Last 24 Hrs  T(C): 37 (17 Jul 2021 06:00), Max: 37.4 (16 Jul 2021 12:00)  T(F): 98.6 (17 Jul 2021 06:00), Max: 99.4 (16 Jul 2021 12:00)  HR: 74 (17 Jul 2021 07:47) (52 - 84)  BP: 137/49 (17 Jul 2021 07:00) (67/34 - 175/60)  BP(mean): 69 (17 Jul 2021 07:00) (40 - 89)  RR: 20 (17 Jul 2021 07:00) (14 - 31)  SpO2: 99% (17 Jul 2021 07:47) (91% - 100%)    Mode: AC/ CMV (Assist Control/ Continuous Mandatory Ventilation), RR (machine): 14, TV (machine): 400, FiO2: 40, PEEP: 5, ITime: 0.8, MAP: 10, PIP: 29    07-16 @ 07:01  -  07-17 @ 07:00  --------------------------------------------------------  IN: 873.8 mL / OUT: 0 mL / NET: 873.8 mL      CAPILLARY BLOOD GLUCOSE      POCT Blood Glucose.: 213 mg/dL (17 Jul 2021 06:24)      PHYSICAL EXAM:    General: NAD. Patient is awake, opens eyes, and follows command  HEENT: NC/AT; PERRL, clear conjunctiva  Neck: supple  Respiratory: CTA b/l  Cardiovascular: +S1/S2; RRR  Abdomen: soft, NT/ND; +BS x4  Extremities: WWP, 2+ peripheral pulses b/l; no LE edema  Skin: normal color and turgor; no rash  Neurological: Patient is intubated and not sedated      MEDICATIONS:  MEDICATIONS  (STANDING):  aspirin  chewable 81 milliGRAM(s) Oral daily  atorvastatin 40 milliGRAM(s) Oral at bedtime  chlorhexidine 0.12% Liquid 15 milliLiter(s) Oral Mucosa every 12 hours  chlorhexidine 2% Cloths 1 Application(s) Topical daily  dextrose 40% Gel 15 Gram(s) Oral once  dextrose 40% Gel 15 Gram(s) Oral once  dextrose 5%. 1000 milliLiter(s) (50 mL/Hr) IV Continuous <Continuous>  dextrose 5%. 1000 milliLiter(s) (100 mL/Hr) IV Continuous <Continuous>  dextrose 50% Injectable 25 Gram(s) IV Push once  dextrose 50% Injectable 12.5 Gram(s) IV Push once  dextrose 50% Injectable 25 Gram(s) IV Push once  diltiazem    Tablet 30 milliGRAM(s) Oral four times a day  epoetin danilo-epbx (RETACRIT) Injectable 08629 Unit(s) IV Push <User Schedule>  glucagon  Injectable 1 milliGRAM(s) IntraMuscular once  heparin  Infusion. 700 Unit(s)/Hr (7 mL/Hr) IV Continuous <Continuous>  insulin lispro (ADMELOG) corrective regimen sliding scale   SubCutaneous every 6 hours  norepinephrine Infusion 0.05 MICROgram(s)/kG/Min (4.04 mL/Hr) IV Continuous <Continuous>  pantoprazole  Injectable 40 milliGRAM(s) IV Push every 12 hours    MEDICATIONS  (PRN):  acetaminophen   Tablet .. 1000 milliGRAM(s) Oral every 6 hours PRN Temp greater or equal to 38C (100.4F), Mild Pain (1 - 3), Moderate Pain (4 - 6)  calamine/zinc oxide Lotion 1 Application(s) Topical three times a day PRN Itching  heparin   Injectable 7000 Unit(s) IV Push every 6 hours PRN For aPTT less than 40  heparin   Injectable 3500 Unit(s) IV Push every 6 hours PRN For aPTT between 40 - 57      ALLERGIES:  Allergies    lisinopril (Other)  opioid-like analgesics (Other)    Intolerances        LABS:                        9.0    13.73 )-----------( 163      ( 17 Jul 2021 06:27 )             31.1     07-17    133<L>  |  94<L>  |  38<H>  ----------------------------<  213<H>  4.3   |  22  |  6.22<H>    Ca    8.8      17 Jul 2021 06:27    TPro  5.8<L>  /  Alb  2.7<L>  /  TBili  0.5  /  DBili  x   /  AST  25  /  ALT  6   /  AlkPhos  110  07-17    PT/INR - ( 16 Jul 2021 11:16 )   PT: 16.4 sec;   INR: 1.46 ratio         PTT - ( 17 Jul 2021 06:27 )  PTT:31.1 sec      RADIOLOGY & ADDITIONAL TESTS: Reviewed.

## 2021-07-17 NOTE — PROGRESS NOTE ADULT - SUBJECTIVE AND OBJECTIVE BOX
INTERVAL HPI/OVERNIGHT EVENTS:    intubated in micu   no bm this morning/overnight   hgb stable    MEDICATIONS  (STANDING):  aspirin  chewable 81 milliGRAM(s) Oral daily  atorvastatin 40 milliGRAM(s) Oral at bedtime  chlorhexidine 0.12% Liquid 15 milliLiter(s) Oral Mucosa every 12 hours  chlorhexidine 2% Cloths 1 Application(s) Topical daily  dextrose 40% Gel 15 Gram(s) Oral once  dextrose 40% Gel 15 Gram(s) Oral once  dextrose 5%. 1000 milliLiter(s) (50 mL/Hr) IV Continuous <Continuous>  dextrose 5%. 1000 milliLiter(s) (100 mL/Hr) IV Continuous <Continuous>  dextrose 50% Injectable 25 Gram(s) IV Push once  dextrose 50% Injectable 12.5 Gram(s) IV Push once  dextrose 50% Injectable 25 Gram(s) IV Push once  diltiazem    Tablet 30 milliGRAM(s) Oral four times a day  epoetin danilo-epbx (RETACRIT) Injectable 81606 Unit(s) IV Push <User Schedule>  glucagon  Injectable 1 milliGRAM(s) IntraMuscular once  heparin  Infusion. 700 Unit(s)/Hr (7 mL/Hr) IV Continuous <Continuous>  insulin lispro (ADMELOG) corrective regimen sliding scale   SubCutaneous every 6 hours  norepinephrine Infusion 0.05 MICROgram(s)/kG/Min (4.04 mL/Hr) IV Continuous <Continuous>  pantoprazole  Injectable 40 milliGRAM(s) IV Push every 12 hours      MEDICATIONS  (PRN):  acetaminophen   Tablet .. 1000 milliGRAM(s) Oral every 6 hours PRN Temp greater or equal to 38C (100.4F), Mild Pain (1 - 3), Moderate Pain (4 - 6)  calamine/zinc oxide Lotion 1 Application(s) Topical three times a day PRN Itching      Allergies    lisinopril (Other)  opioid-like analgesics (Other)    Intolerances        Review of Systems: *pt  nonverbal, unable to obtain ROS         ICU Vital Signs Last 24 Hrs  T(C): 36.9 (17 Jul 2021 08:00), Max: 37.4 (16 Jul 2021 12:00)  T(F): 98.4 (17 Jul 2021 08:00), Max: 99.4 (16 Jul 2021 12:00)  HR: 73 (17 Jul 2021 08:40) (52 - 84)  BP: 127/40 (17 Jul 2021 08:40) (67/34 - 175/60)  BP(mean): 60 (17 Jul 2021 08:40) (40 - 89)  ABP: --  ABP(mean): --  RR: 14 (17 Jul 2021 08:40) (14 - 31)  SpO2: 98% (17 Jul 2021 08:40) (91% - 100%)      PHYSICAL EXAM:    Constitutional: NAD  HEENT: EOMI, throat clear  Neck: No LAD, supple  Respiratory: CTA and P  Cardiovascular: S1 and S2, RRR, no M  Gastrointestinal: BS+, soft, NT/ND, neg HSM,  Extremities: No peripheral edema, neg clubbing, cyanosis  Vascular: 2+ peripheral pulses  Neurological: A/O x 0  Psychiatric: intubated  Skin: No rashes      LABS:                        9.0    13.73 )-----------( 163      ( 17 Jul 2021 06:27 )             31.1     07-17    133<L>  |  94<L>  |  38<H>  ----------------------------<  213<H>  4.3   |  22  |  6.22<H>    Ca    8.8      17 Jul 2021 06:27    TPro  5.8<L>  /  Alb  2.7<L>  /  TBili  0.5  /  DBili  x   /  AST  25  /  ALT  6   /  AlkPhos  110  07-17    PT/INR - ( 16 Jul 2021 11:16 )   PT: 16.4 sec;   INR: 1.46 ratio         PTT - ( 17 Jul 2021 06:27 )  PTT:31.1 sec               RADIOLOGY & ADDITIONAL TESTS:

## 2021-07-17 NOTE — PROGRESS NOTE ADULT - SUBJECTIVE AND OBJECTIVE BOX
Nephrology Followup Note - [phone] - Dr Mendiola / Dr Knight / Dr Macias / Dr Arroyo / Dr Mackey / Dr Bowen / Dr Esposito / Dr Calle  Pt seen and examined at bedside  No acute events overnight. BP stable, but remains in pressor support     Allergies:  lisinopril (Other)  opioid-like analgesics (Other)    Hospital Medications:   MEDICATIONS  (STANDING):  aspirin  chewable 81 milliGRAM(s) Oral daily  atorvastatin 40 milliGRAM(s) Oral at bedtime  chlorhexidine 0.12% Liquid 15 milliLiter(s) Oral Mucosa every 12 hours  chlorhexidine 2% Cloths 1 Application(s) Topical daily  dextrose 40% Gel 15 Gram(s) Oral once  dextrose 40% Gel 15 Gram(s) Oral once  dextrose 5%. 1000 milliLiter(s) (50 mL/Hr) IV Continuous <Continuous>  dextrose 5%. 1000 milliLiter(s) (100 mL/Hr) IV Continuous <Continuous>  dextrose 50% Injectable 25 Gram(s) IV Push once  dextrose 50% Injectable 12.5 Gram(s) IV Push once  dextrose 50% Injectable 25 Gram(s) IV Push once  diltiazem    Tablet 30 milliGRAM(s) Oral four times a day  epoetin danilo-epbx (RETACRIT) Injectable 23350 Unit(s) IV Push <User Schedule>  glucagon  Injectable 1 milliGRAM(s) IntraMuscular once  heparin  Infusion. 700 Unit(s)/Hr (7 mL/Hr) IV Continuous <Continuous>  insulin lispro (ADMELOG) corrective regimen sliding scale   SubCutaneous every 6 hours  norepinephrine Infusion 0.05 MICROgram(s)/kG/Min (4.04 mL/Hr) IV Continuous <Continuous>  pantoprazole  Injectable 40 milliGRAM(s) IV Push every 12 hours    VITALS:  T(F): 98 (07-17-21 @ 12:00), Max: 99.3 (07-16-21 @ 20:00)  HR: 79 (07-17-21 @ 12:00)  BP: 135/39 (07-17-21 @ 12:00)  RR: 15 (07-17-21 @ 12:00)  SpO2: 100% (07-17-21 @ 12:00)  Wt(kg): --    07-16 @ 07:01  -  07-17 @ 07:00  --------------------------------------------------------  IN: 873.8 mL / OUT: 0 mL / NET: 873.8 mL    07-17 @ 07:01  -  07-17 @ 12:51  --------------------------------------------------------  IN: 89 mL / OUT: 0 mL / NET: 89 mL        PHYSICAL EXAM:  Constitutional: NAD  HEENT: ETT, OGT   Neck: No JVD  Respiratory: CTAB, no wheezes, rales or rhonchi  Cardiovascular: S1, S2, RRR  Gastrointestinal: BS+, soft, NT/ND  Extremities: No cyanosis or clubbing. +peripheral edema  Neurological: unresponsive  : No CVA tenderness. No wagner.   Skin: No rashes  Vascular Access: LUE AV access +thrill and bruit.     LABS:  07-17    133<L>  |  94<L>  |  38<H>  ----------------------------<  213<H>  4.3   |  22  |  6.22<H>    Ca    8.8      17 Jul 2021 06:27    TPro  5.8<L>  /  Alb  2.7<L>  /  TBili  0.5  /  DBili      /  AST  25  /  ALT  6   /  AlkPhos  110  07-17    Creatinine Trend: 6.22 <--, 4.99 <--, 6.40 <--, 5.18 <--, 6.40 <--, 6.05 <--, 8.49 <--, 8.56 <--, 7.83 <--                        9.0    13.73 )-----------( 163      ( 17 Jul 2021 06:27 )             31.1     Urine Studies:      RADIOLOGY & ADDITIONAL STUDIES:

## 2021-07-17 NOTE — PROGRESS NOTE ADULT - ASSESSMENT
71 yo M with PMhx of CAD s/p CABG (course at that time complicated by pleural effusion requiring chest tubes), ESRD on HD (MWF), DMII, HTN, afib on coumadin, and anemia transferred from [facility] for evaluation of chest pain after HD and GIB s/p 2U PRBC transfusion and aflutter/afib with RVR managed with metoprolol. Admitted to MICU for hypotension dependent on pressors likely 2/2 to sepsis. In MICU, patient had a cardiac arrest on 7/13 requiring CPR, 3x epi, and 2x shock (for VTach).    #Neuro  - Off of sedation. Patient opens eyes on command and moves his toes. He is not able move his fingers or   - CT head shows foci of low density in the R anterolateral frontal lobe and L posterior temporal lobe concerning for acute infarcts.  - Mechanism likely cardioembolic in setting of patient with known atrial fibrillation with cardiac arrest vs septic emboli  - per neurology, can consider MRI brain w/o contrast. However, unlikely to  since patient has known atrial fibrillation and will need to be continued of full dose anticoagulation.  - Avoid hypotension, keep -180 for neuroprotection  - c/w Atorvastatin 40MG QHS for secondary stroke prevention.    #Cardiovascular  - Cardiac arrest: Patient had asystole night of 7/13 and CPR was started. S/p epi x3 and shock x2 with return to NSR. Cards is following  - TTE on 7/13 showed Mild mitral regurgitation with biventricular dysfunction of LV and RV systolic function. Endocardium was not well visualized, Hypokinesis of the basal inferior wall and basal inferoseptum.  - TRUMAN scheduled for monday morning, per cardiology.   - c/w Levophed wean as tolerated.   - BP goal 140-180 to maintain cerebral perfusion after stroke  - Will consider switching to midodrine once HR stable  - Troponins 2400, CKMB 7.1. Troponin stable from prior to cardiac arrest. They are likely 2/2 chronic condition/ESRD on HD vs Hypotensive event. Will continue to trend and monitor.    A. Fib  - on coumadin at home,  - c/ heparin gtt prophylaxis. PTT improved, patient on heparin. Awaiting TRUMAN for monday.   - From neurology stand point, heparin is sufficient for now. Will need to be switched to DOAC in the future if kidney function allows  - 7/11: tried to transition heparin gtt to eliquis but pt is not a candidate due to his acute MIGUEL; back on heparin gtt  - c/ diltiazem for rate control    #Respiratory  - Loculated Pleural effusions: chest tube removed   - pleural fluid cx: (-); transudative.   - intubated and on ventilator (24/400/5/40). F/u ABG and adjust as necessary  - CXR s/p chest tube removal showed no evidence of atelectasis. Demonstrated loculated left pleural effusion decreased in size and new hazy right lower lung opacity which could be due to a small layering right pleural effusion with associated passive atelectasis, atelectasis of other cause, and/or pneumonia.  - abx as below    #GI/Nutrition:  - NPO with tube feed. Hold tube feed night of 7/18 for TRUMAN on 7/19  - Was c/o abdominal pain on presentation, bedside POCUS didn't show ascites. No abdominal pain at this time. Continue to monitor.   - negative occult bleeding, hgb stable  - Per GI, patient is stable to get TRUMAN and does not need endoscopic evaluation prior to TRUMAN.   - Zofran PRN for Nausea.    #/Renal  - ESRD: HD on MWF. Most recent Scr 6.40. Potassium is stable.   - Last dialysis session 7/15. Per nephro, will receive dialysis again today  - Electrolytes stable  - c/w Epo 20k tiw with HD      #Skin  - HD Access: fistula in left upper arm  - Improving erythematous and tender on right wrist. Continue to monitor. RUE US neg for thrombus  - US of fistula showed patent fistula with no abscess      #ID  - S aureus PCR: (+),  MRSA PCR negative  - BCx on 7/7 now growing MRSA: restarted on vanc (7/11- )  - Vanc level on 7/13 was 24.8. f/u level today  - Vanco today was 22.1. Will give vancomycin after dialysis today.  - Surveillance blood Cx have been negative for 48hours, no longer need to continue    #Endocrine  - DMII: c/w Lispro SS. Monitor glucose.      #Hematologic/DVT ppx  -pTT yesterday >200. Held heparin yesterday. pTT today 31.1. Restarted heparin for prophylaxis      #Ethics  - full code as per wife- full code as per wife       71 yo M with PMhx of CAD s/p CABG (course at that time complicated by pleural effusion requiring chest tubes), ESRD on HD (MWF), DMII, HTN, afib on coumadin, and anemia transferred from [facility] for evaluation of chest pain after HD and GIB s/p 2U PRBC transfusion and aflutter/afib with RVR managed with metoprolol. Admitted to MICU for hypotension dependent on pressors likely 2/2 to sepsis. In MICU, patient had a cardiac arrest on 7/13 requiring CPR, 3x epi, and 2x shock (for VTach).    #Neuro  - Off of sedation. Patient opens eyes on command and moves his toes. He is not able move his fingers or   - CT head shows foci of low density in the R anterolateral frontal lobe and L posterior temporal lobe concerning for acute infarcts.  - Mechanism likely cardioembolic in setting of patient with known atrial fibrillation with cardiac arrest vs septic emboli  - per neurology, can consider MRI brain w/o contrast. However, unlikely to  since patient has known atrial fibrillation and will need to be continued of full dose anticoagulation.  - Avoid hypotension, keep -180 for neuroprotection  - c/w Atorvastatin 40MG QHS for secondary stroke prevention.    #Cardiovascular  - Cardiac arrest: Patient had asystole night of 7/13 and CPR was started. S/p epi x3 and shock x2 with return to NSR. Cards is following  - TTE on 7/13 showed Mild mitral regurgitation with biventricular dysfunction of LV and RV systolic function. Endocardium was not well visualized, Hypokinesis of the basal inferior wall and basal inferoseptum.  - TRUMAN scheduled for monday morning, per cardiology.   - c/w Levophed wean as tolerated.   - BP goal 140-180 to maintain cerebral perfusion after stroke  - Will consider switching to midodrine once HR stable  - Troponins 2400, CKMB 7.1. Troponin stable from prior to cardiac arrest. They are likely 2/2 chronic condition/ESRD on HD vs Hypotensive event. Will continue to trend and monitor.    A. Fib  - on coumadin at home,  - c/ heparin gtt prophylaxis. PTT improved, patient on heparin. Awaiting TRUMAN for monday.   - From neurology stand point, heparin is sufficient for now. Will need to be switched to DOAC in the future if kidney function allows  - 7/11: tried to transition heparin gtt to eliquis but pt is not a candidate due to his acute MIUGEL; back on heparin gtt  - c/ diltiazem for rate control    #Respiratory  - Loculated Pleural effusions: chest tube removed   - pleural fluid cx: (-); transudative.   - intubated and on ventilator (24/400/5/40). F/u ABG and adjust as necessary  - CXR s/p chest tube removal showed no evidence of atelectasis. Demonstrated loculated left pleural effusion decreased in size and new hazy right lower lung opacity which could be due to a small layering right pleural effusion with associated passive atelectasis, atelectasis of other cause, and/or pneumonia.  - abx as below    #GI/Nutrition:  - NPO with tube feed. Hold tube feed night of 7/18 for TRUMAN on 7/19  - Was c/o abdominal pain on presentation, bedside POCUS didn't show ascites. No abdominal pain at this time. Continue to monitor.   - negative occult bleeding, hgb stable  - Per GI, patient is stable to get TRUMAN and does not need endoscopic evaluation prior to TRUMAN.   - Zofran PRN for Nausea.    #/Renal  - ESRD: HD on MWF. Most recent Scr 6.40. Potassium is stable.   - Last dialysis session 7/15. Per nephro, will receive dialysis again today  - Electrolytes stable  - c/w Epo 20k tiw with HD      #Skin  - HD Access: fistula in left upper arm  - Improving erythematous and tender on right wrist. Continue to monitor. RUE US neg for thrombus  - US of fistula showed patent fistula with no abscess      #ID  - S aureus PCR: (+),  MRSA PCR negative  - BCx on 7/7 now growing MRSA: restarted on vanc (7/11- )  - Vanc level on 7/13 was 24.8. f/u level today  - Vanco today was 22.1. Will hold vanco today. Recheck level tomorrow  - Surveillance blood Cx have been negative for 48hours, no longer need to continue    #Endocrine  - DMII: c/w Lispro SS. Monitor glucose.      #Hematologic/DVT ppx  -pTT yesterday >200. Held heparin yesterday. pTT today 31.1. Restarted heparin for prophylaxis      #Ethics  - full code as per wife- full code as per wife

## 2021-07-17 NOTE — PROGRESS NOTE ADULT - SUBJECTIVE AND OBJECTIVE BOX
CARDIOLOGY      tele - SR    S: remains intubated; ros limited     Review of Systems:   Constitutional: [ ] fevers, [ ] chills.   Skin: [ ] dry skin. [ ] rashes.  Psychiatric: [ ] depression, [ ] anxiety.   Gastrointestinal: [ ] BRBPR, [ ] melena.   Neurological: [ ] confusion. [ ] seizures. [ ] shuffling gait.   Ears,Nose,Mouth and Throat: [ ] ear pain [ ] sore throat.   Eyes: [ ] diplopia.   Respiratory: [ ] hemoptysis. [ ] shortness of breath  Cardiovascular: See HPI above  Hematologic/Lymphatic: [ ] anemia. [ ] painful nodes. [ ] prolonged bleeding.   Genitourinary: [ ] hematuria. [ ] flank pain.   Endocrine: [ ] significant change in weight. [ ] intolerance to heat and cold.     Review of systems [ ] otherwise negative, [x ] otherwise unable to obtain    FH: no family history of sudden cardiac death in first degree relatives    SH: [ ] tobacco, [ ] alcohol, [ ] drugs         acetaminophen   Tablet .. 1000 milliGRAM(s) Oral every 6 hours PRN  aspirin  chewable 81 milliGRAM(s) Oral daily  atorvastatin 40 milliGRAM(s) Oral at bedtime  calamine/zinc oxide Lotion 1 Application(s) Topical three times a day PRN  chlorhexidine 0.12% Liquid 15 milliLiter(s) Oral Mucosa every 12 hours  chlorhexidine 2% Cloths 1 Application(s) Topical daily  dextrose 40% Gel 15 Gram(s) Oral once  dextrose 40% Gel 15 Gram(s) Oral once  dextrose 5%. 1000 milliLiter(s) IV Continuous <Continuous>  dextrose 5%. 1000 milliLiter(s) IV Continuous <Continuous>  dextrose 50% Injectable 25 Gram(s) IV Push once  dextrose 50% Injectable 12.5 Gram(s) IV Push once  dextrose 50% Injectable 25 Gram(s) IV Push once  diltiazem    Tablet 30 milliGRAM(s) Oral four times a day  epoetin danilo-epbx (RETACRIT) Injectable 93020 Unit(s) IV Push <User Schedule>  glucagon  Injectable 1 milliGRAM(s) IntraMuscular once  heparin   Injectable 7000 Unit(s) IV Push every 6 hours PRN  heparin   Injectable 3500 Unit(s) IV Push every 6 hours PRN  heparin  Infusion. 700 Unit(s)/Hr IV Continuous <Continuous>  insulin lispro (ADMELOG) corrective regimen sliding scale   SubCutaneous every 6 hours  norepinephrine Infusion 0.05 MICROgram(s)/kG/Min IV Continuous <Continuous>  pantoprazole  Injectable 40 milliGRAM(s) IV Push every 12 hours                            9.0    13.73 )-----------( 163      ( 17 Jul 2021 06:27 )             31.1       Hemoglobin: 9.0 g/dL (07-17 @ 06:27)  Hemoglobin: 7.9 g/dL (07-16 @ 05:51)  Hemoglobin: 8.2 g/dL (07-15 @ 04:24)  Hemoglobin: 9.0 g/dL (07-14 @ 03:59)  Hemoglobin: 9.1 g/dL (07-13 @ 02:50)      07-17    133<L>  |  94<L>  |  38<H>  ----------------------------<  213<H>  4.3   |  22  |  6.22<H>    Ca    8.8      17 Jul 2021 06:27    TPro  5.8<L>  /  Alb  2.7<L>  /  TBili  0.5  /  DBili  x   /  AST  25  /  ALT  6   /  AlkPhos  110  07-17    Creatinine Trend: 6.22<--, 4.99<--, 6.40<--, 5.18<--, 6.40<--, 6.05<--    COAGS: PTT - ( 17 Jul 2021 06:27 )  PTT:31.1 sec          T(C): 36.9 (07-17-21 @ 08:00), Max: 37.4 (07-16-21 @ 12:00)  HR: 73 (07-17-21 @ 08:40) (52 - 84)  BP: 127/40 (07-17-21 @ 08:40) (67/34 - 175/60)  RR: 14 (07-17-21 @ 08:40) (14 - 31)  SpO2: 98% (07-17-21 @ 08:40) (91% - 100%)  Wt(kg): --    I&O's Summary    16 Jul 2021 07:01  -  17 Jul 2021 07:00  --------------------------------------------------------  IN: 873.8 mL / OUT: 0 mL / NET: 873.8 mL    17 Jul 2021 07:01  -  17 Jul 2021 08:57  --------------------------------------------------------  IN: 89 mL / OUT: 0 mL / NET: 89 mL      General: intubated; sedated   Head: Normocephalic and atraumatic.   Neck: No JVD. No bruits. Supple. Does not appear to be enlarged.   Cardiovascular: + S1,S2 ; RRR Soft systolic murmur at the left lower sternal border. No rubs noted.    Lungs: decreased BS BL  Abdomen: + BS, soft. Non tender. Non distended. No rebound. No guarding.   Extremities: No clubbing/cyanosis/edema.   Neurologic: unable to assess  Skin: Warm and moist. The patient's skin has normal elasticity and good skin turgor.   Psychiatric: unable to assess   Musculoskeletal: unable to assess    DATA  < from: Transthoracic Echocardiogram (07.07.21 @ 18:17) >  ------------------------------------------------------------------------  CONCLUSIONS:  1. Mitral annular calcification, otherwise normal mitral  valve. Minimal mitral regurgitation.  2. Endocardium not well visualized; grossly normal left  ventricular systolic function.  3. Right ventricular enlargement with decreased right  ventricular systolic function.  4. Inadequate tricuspid regurgitation Doppler envelope  precludes estimation of RVSP.  ------------------------------------------------------------------------  Confirmed on  7/7/2021 - 21:16:20 by Osvaldo Bertrand M.D.,  [facility], FASE    < end of copied text >        A/P) 71 y/o male PMH CAD s/p CABG and old PCI, AFL x 1 year on coumadin, HTN, hyperlipidemia, DM, ESRD on HD, and hypothyroidism originally admitted to [facility] with GIB, transferred to [facility], found to have septic shock and MRSA bacteremia.     -Events noted; pt. s/p PEA arrest   -Repeat TTE with only mild segmental LV dysfunction in the inferoseptum  -Given negative CPK, do not suspect acs  -Suspect elevated troponin in the setting of demand ischemia from underlying septic shock  -Pressor support per MICU  -EP follow up appreciated   -Given MRSA bacteremia, check TRUMAN to rule out endocarditis if family agreeable - appreciate GI eval - cleared by GI for TRUMAN    -Abx per ID  -CTH head noted - pt. with cva - ac was initially held due to GIB and acute blood loss anemia at [facility] resulting in a 6 point drop in Hb - appreciate GI eval - cleared by GI to resume AC  -Follow up neuro   -Supportive care per MICU             CARDIOLOGY      tele - SR    S: remains intubated; ros limited     Review of Systems:   Constitutional: [ ] fevers, [ ] chills.   Skin: [ ] dry skin. [ ] rashes.  Psychiatric: [ ] depression, [ ] anxiety.   Gastrointestinal: [ ] BRBPR, [ ] melena.   Neurological: [ ] confusion. [ ] seizures. [ ] shuffling gait.   Ears,Nose,Mouth and Throat: [ ] ear pain [ ] sore throat.   Eyes: [ ] diplopia.   Respiratory: [ ] hemoptysis. [ ] shortness of breath  Cardiovascular: See HPI above  Hematologic/Lymphatic: [ ] anemia. [ ] painful nodes. [ ] prolonged bleeding.   Genitourinary: [ ] hematuria. [ ] flank pain.   Endocrine: [ ] significant change in weight. [ ] intolerance to heat and cold.     Review of systems [ ] otherwise negative, [x ] otherwise unable to obtain    FH: no family history of sudden cardiac death in first degree relatives    SH: [ ] tobacco, [ ] alcohol, [ ] drugs         acetaminophen   Tablet .. 1000 milliGRAM(s) Oral every 6 hours PRN  aspirin  chewable 81 milliGRAM(s) Oral daily  atorvastatin 40 milliGRAM(s) Oral at bedtime  calamine/zinc oxide Lotion 1 Application(s) Topical three times a day PRN  chlorhexidine 0.12% Liquid 15 milliLiter(s) Oral Mucosa every 12 hours  chlorhexidine 2% Cloths 1 Application(s) Topical daily  dextrose 40% Gel 15 Gram(s) Oral once  dextrose 40% Gel 15 Gram(s) Oral once  dextrose 5%. 1000 milliLiter(s) IV Continuous <Continuous>  dextrose 5%. 1000 milliLiter(s) IV Continuous <Continuous>  dextrose 50% Injectable 25 Gram(s) IV Push once  dextrose 50% Injectable 12.5 Gram(s) IV Push once  dextrose 50% Injectable 25 Gram(s) IV Push once  diltiazem    Tablet 30 milliGRAM(s) Oral four times a day  epoetin danilo-epbx (RETACRIT) Injectable 23069 Unit(s) IV Push <User Schedule>  glucagon  Injectable 1 milliGRAM(s) IntraMuscular once  heparin   Injectable 7000 Unit(s) IV Push every 6 hours PRN  heparin   Injectable 3500 Unit(s) IV Push every 6 hours PRN  heparin  Infusion. 700 Unit(s)/Hr IV Continuous <Continuous>  insulin lispro (ADMELOG) corrective regimen sliding scale   SubCutaneous every 6 hours  norepinephrine Infusion 0.05 MICROgram(s)/kG/Min IV Continuous <Continuous>  pantoprazole  Injectable 40 milliGRAM(s) IV Push every 12 hours                            9.0    13.73 )-----------( 163      ( 17 Jul 2021 06:27 )             31.1       Hemoglobin: 9.0 g/dL (07-17 @ 06:27)  Hemoglobin: 7.9 g/dL (07-16 @ 05:51)  Hemoglobin: 8.2 g/dL (07-15 @ 04:24)  Hemoglobin: 9.0 g/dL (07-14 @ 03:59)  Hemoglobin: 9.1 g/dL (07-13 @ 02:50)      07-17    133<L>  |  94<L>  |  38<H>  ----------------------------<  213<H>  4.3   |  22  |  6.22<H>    Ca    8.8      17 Jul 2021 06:27    TPro  5.8<L>  /  Alb  2.7<L>  /  TBili  0.5  /  DBili  x   /  AST  25  /  ALT  6   /  AlkPhos  110  07-17    Creatinine Trend: 6.22<--, 4.99<--, 6.40<--, 5.18<--, 6.40<--, 6.05<--    COAGS: PTT - ( 17 Jul 2021 06:27 )  PTT:31.1 sec          T(C): 36.9 (07-17-21 @ 08:00), Max: 37.4 (07-16-21 @ 12:00)  HR: 73 (07-17-21 @ 08:40) (52 - 84)  BP: 127/40 (07-17-21 @ 08:40) (67/34 - 175/60)  RR: 14 (07-17-21 @ 08:40) (14 - 31)  SpO2: 98% (07-17-21 @ 08:40) (91% - 100%)  Wt(kg): --    I&O's Summary    16 Jul 2021 07:01  -  17 Jul 2021 07:00  --------------------------------------------------------  IN: 873.8 mL / OUT: 0 mL / NET: 873.8 mL    17 Jul 2021 07:01  -  17 Jul 2021 08:57  --------------------------------------------------------  IN: 89 mL / OUT: 0 mL / NET: 89 mL      General: intubated; sedated   Head: Normocephalic and atraumatic.   Neck: No JVD. No bruits. Supple. Does not appear to be enlarged.   Cardiovascular: + S1,S2 ; RRR Soft systolic murmur at the left lower sternal border. No rubs noted.    Lungs: decreased BS BL  Abdomen: + BS, soft. Non tender. Non distended. No rebound. No guarding.   Extremities: No clubbing/cyanosis/edema.   Neurologic: unable to assess  Skin: Warm and moist. The patient's skin has normal elasticity and good skin turgor.   Psychiatric: unable to assess   Musculoskeletal: unable to assess    DATA  < from: Transthoracic Echocardiogram (07.07.21 @ 18:17) >  ------------------------------------------------------------------------  CONCLUSIONS:  1. Mitral annular calcification, otherwise normal mitral  valve. Minimal mitral regurgitation.  2. Endocardium not well visualized; grossly normal left  ventricular systolic function.  3. Right ventricular enlargement with decreased right  ventricular systolic function.  4. Inadequate tricuspid regurgitation Doppler envelope  precludes estimation of RVSP.  ------------------------------------------------------------------------  Confirmed on  7/7/2021 - 21:16:20 by Osvaldo Bertrand M.D.,  [facility], FASE    < end of copied text >        A/P) 71 y/o male PMH CAD s/p CABG and old PCI, AFL x 1 year on coumadin, HTN, hyperlipidemia, DM, ESRD on HD, and hypothyroidism originally admitted to [facility] with GIB, transferred to [facility], found to have septic shock and MRSA bacteremia.     -Events noted; pt. s/p PEA arrest   -Repeat TTE with only mild segmental LV dysfunction in the inferoseptum  -Given negative CPK, do not suspect acs  -Suspect elevated troponin in the setting of demand ischemia from underlying septic shock  -Pressor support per MICU  -Given MRSA bacteremia, check TRUMAN to rule out endocarditis if family agreeable - appreciate GI eval - cleared by GI for TRUMAN    -Abx per ID  -CTH head noted - pt. with cva - ac was initially held due to GIB and acute blood loss anemia at [facility] resulting in a 6 point drop in Hb - appreciate GI eval - cleared by GI to resume AC  -Follow up neuro   -Supportive care per MICU

## 2021-07-17 NOTE — PROGRESS NOTE ADULT - ASSESSMENT
69 yo M with PMhx of CAD s/p CABG (course at that time complicated by pleural effusion requiring chest tubes), ESRD on HD (MWF), DMII, HTN, afib on coumadin, and anemia presented from [facility] for evaluation of chest pain and GIB.       left sided pleural effusion: loculated and chronic:   CAD:  CABG:  ESRD:   DM:  HTN:  A fibrillation:   GI Bleed    7/7:    left sided pleural effusion: loculated and chronic: he has chr loculated pleural effusion on left side: He had chest tube placement in last June 2020 at another hospital: no chemistry is available but he had negative cytology at that time: This time the effusion seems slightly worse and has loculated effusion with pleural thickening: heis not SOB andhasbeen on roomair: I think , it at all, if this effusion needs to be dealt with : it is probably vats: will contact thoracic surgery : etiology not very clear as there is no previous chemistry: coult be exudative or transudatve: he is on HD and now it is a chr christiano effusion   CAD: cont per cards  CABG: cont current meds  ESRD: on HD   DM: monitor and control  HTN: controlled  A fibrillation: off ac:   GI Bleed: per gi :  DW pt and t eam1    7/8:    left sided pleural effusion:: fever: s/p left sided chest tube placement: cultures sent: however with pr criteria it seems transudative await serum LDH but pleural fluid LDH is low:  already started on broad spectrum antibiotics ID to see: await cultures:   CAD: cont per cards  CABG: cont current meds  ESRD: on HD   DM: monitor and control  HTN: controlled  A fibrillation: off ac:   GI Bleed: per gi :  DW pmd    7/9:    left sided pleural effusion:: fever: s/p left sided chest tube placement: cultures sent: however with pr criteria it seems borderline transudative and transudative by LDH criteria: the vulutres ahve been negative so far:  already started on broad spectrum antibiotics ID to see: await cultures: ? source of spesis  CAD: cont per cards  CABG: cont current meds  ESRD: on HD   DM: monitor and control  HTN: in shock: now on vasopressors:   A fibrillation: off ac:   GI Bleed: per gi :  DW pmd  micu team    7/11:      left sided pleural effusion:: fever: s/p left sided chest tube placement: cultures sent: however with pr criteria it seems borderline transudative and transudative by LDH criteria: the cultures have been negative so far:  already started on broad spectrum antibiotics : Has MSSA bactermia  CAD: cont per cards: still on vasopressors: wean as tolerated ? needs molly ro tule out IE : would defer to cards:   CABG: cont current meds  ESRD: on HD   DM: monitor and control  HTN: in shock: now on vasopressors:   A fibrillation: off ac:   GI Bleed: per gi :  DW pmd  micu team    7/12:    left sided pleural effusion:: fever: s/p left sided chest tube placement:- now removed: cultures sent: however with pr criteria it seems borderline transudative and transudative by LDH criteria: the cultures have been negative so far:  already started on broad spectrum antibiotics : Has MRSA bactermia: vanco restarted yesterday ? echo/molly  Ac hypercarbic resp fialure: needs to be watched closelyin MICU : needs bipap may need intubation" At this time he is alert and awake: and responds to simple questions:    CAD: cont per cards: still on vasopressors: wean as tolerated ? needs molly ro tule out IE : would defer to cards:   CABG: cont current meds  ESRD: on HD   DM: monitor and control  HTN: in shock: now on vasopressors:   A fibrillation: off ac:   GI Bleed: per gi :  DW pmd  cards and bedstaff    7/13:  left sided pleural effusion:: fever: s/p left sided chest tube placement:- now removed: cultures sent: however with pr criteria it seems borderline transudative and transudative by LDH criteria: the cultures have been negative so far:  already started on broad spectrum antibiotics : Has MRSA bacteremia vanco restarted yesterday ? echo/molly  Ac hypercarbic resp fialure: now intubated s/p acls protocol last night   CAD: cont per cards: still on vasopressors: wean as tolerated ? needs molly ro tule out IE : would defer to cards:   CABG: cont current meds  ESRD: on HD   DM: monitor and control  HTN: in shock: now on vasopressors:   A fibrillation: off ac:   GI Bleed: per gi :  LINDA  staff  pt is critically ill now:     7/14:  left sided pleural effusion:: fever: s/p left sided chest tube placement:- now removed: cultures sent: however with pr criteria it seems borderline transudative and transudative by LDH criteria: the cultures have been negative so far:  already started on broad spectrum antibiotics : Has MRSA bacteremia vanco restarted yesterday now for  echo/molly  Ac hypercarbic resp fialure: now intubated s/p acls protocol last night -s/p cardiac arrest: ROSC 10 mts:   CAD: cont per cards: still on vasopressors: wean as tolerated ? needs molly ro tule out IE : would defer to cards: Cotn antbitioc  CABG: cont current meds  ESRD: on HD   DM: monitor and control  HTN: in shock: now on vasopressors:   A fibrillation: off ac:   GI Bleed: per gi :  DW  staff  pt is critically ill now: dw cards    7/15:    left sided pleural effusion:: transudative pl effusion with no with: certainly not the source for MRSA in blood:   Ac hypercarbic resp fialure: now intubated s/p acls protocol last night -s/p cardiac arrest: ROSC 10 mts:   CAD: cont per cards: still on vasopressors: wean as tolerated ? needs molly ro tule out IE : would defer to cards: Cotn antbitioc  CABG: cont current meds  ESRD: on HD   DM: monitor and control  HTN: in shock: now on vasopressors:   A fibrillation: on ac:   GI Bleed: per gi :  DW  staff  pt is critically ill now:     7/16"  left sided pleural effusion:: transudative pl effusion with no with: certainly not the source for MRSA in blood: ?source for MRSA:   Ac hypercarbic resp fialure: now intubated s/p acls protocol last night -s/p cardiac arrest: ROSC 10 mts: he seems to respond off sedation:per MICU note:   CAD: cont per cards: still on vasopressors: wean as tolerated ? needs molly ro tule out IE : would defer to cards: Cotn antbitioc  CABG: cont current meds  ESRD: on HD   DM: monitor and control  HTN: in shock: now on vasopressors:   A fibrillation: on ac:   GI Bleed: per gi :  DW  staff: PMD  pt is critically ill now:      7/17:    left sided pleural effusion:: transudative pl effusion with no with: certainly not the source for MRSA in blood: ?source for MRSA: for molly on monday  Ac hypercarbic resp fialure: now intubated s/p acls protocol last night -s/p cardiac arrest: ROSC 10 mts:   CAD: cont per cards: still on vasopressors: wean as tolerated ? needs molly ro tule out IE : would defer to cards: Cont antibiotics:   CABG: cont current meds  ESRD: on HD   DM: monitor and control  HTN: in shock: now on vasopressors:   A fibrillation: on ac:   GI Bleed: per gi :  DW  staff: PMD  pt is critically ill now:

## 2021-07-17 NOTE — PROGRESS NOTE ADULT - ASSESSMENT
71 yo M with PMhx of CAD s/p CABG (course at that time complicated by pleural effusion requiring chest tubes), ESRD on HD (MWF), DMII, HTN, afib on coumadin, and anemia presented from [facility] for evaluation of chest pain and GIB. 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

## 2021-07-17 NOTE — PROGRESS NOTE ADULT - ASSESSMENT
69 yo M with PMhx of CAD s/p CABG (course at that time complicated by pleural effusion requiring chest tubes), ESRD on HD (MWF), DMII, HTN, afib on coumadin, and anemia presented from [facility] for evaluation of chest pain and GIB. Renal following for ESRD Mx. s/p cardiac arrest. CTH showed acute infarcts.     ESRD  Maintenance schedule MWF  Plan for next HD today, low UF goal, ~0.5kg, as BP tolerates  Electrolytes within normal limits.   Improved hemodynamics, pressor titration as per ICU team  dose all meds for ESRD    Anemia in CKD+GIB- Hb <goal  c/w Epo 20k tiw with HD  monitor h/h, neg FOBT  GI following, No plans for cscope.     Rudy Mendiola MD  [facility]  Office [phone]  Ans Serv [phone]  [facility] [phone]

## 2021-07-17 NOTE — PROGRESS NOTE ADULT - SUBJECTIVE AND OBJECTIVE BOX
Neurology Progress    VIOLETTA [id number]    HPI:  71 yo M with PMhx of CAD s/p CABG (course at that time complicated by pleural effusion requiring chest tubes), ESRD on HD (MWF), DMII, HTN, afib on coumadin, and anemia presented from [facility] for evaluation of chest pain and GIB. Patient is AAOX2 and was able to provide some information. Most of the information was obtained from charting. Patient was admitted to [facility] after developing chest pain during HD. He was found to have Hgb of 6 and elevated trops concerning for GIB and NSTEM II. He was give 2 units of pRBCs and monitored off warfarin/aspirin. He underwent bowel prep but it did not show any bloody stool. As a result, no colonoscopy was performed. His course was complicated by aflutter/afib with RVR. He was managed with metoprolol and never required cardioversion. On day of transfer, his Hgb was in the 8s. Patient was deemed stable enough to be restarted on aspirin and warfarin. Patient was transferred to [facility] for further evaluation. Currently, patient have no symptoms but reports to have intermittent chest pain, that is suprasternal, non-radiating, exertional at times, and non-reproducible on palpation. it improves with rest and worsens with movement. He denies any fever, chills, cough, orthopnea, PND, LE edema, abdominal pain, diarrhea, or dysuria.    On the floor, vitals are WNL.  (03 Jul 2021 21:11)      Past Medical History  HTN (Hypertension)    DM (Diabetes Mellitus)    CAD (Coronary Artery Disease)    Hypercholesterolemia    Coronary Stent    Heart Attack    H/O: CVA    CKD (chronic kidney disease)    Hyperthyroidism    Stented coronary artery    ESRD (end stage renal disease) on dialysis        Past Surgical History  Boil of Buttock    S/P cataract extraction    Hemodialysis access, AV graft    S/P CABG x 4        MEDICATIONS    acetaminophen   Tablet .. 1000 milliGRAM(s) Oral every 6 hours PRN  aspirin  chewable 81 milliGRAM(s) Oral daily  atorvastatin 40 milliGRAM(s) Oral at bedtime  calamine/zinc oxide Lotion 1 Application(s) Topical three times a day PRN  chlorhexidine 0.12% Liquid 15 milliLiter(s) Oral Mucosa every 12 hours  chlorhexidine 2% Cloths 1 Application(s) Topical daily  dextrose 40% Gel 15 Gram(s) Oral once  dextrose 40% Gel 15 Gram(s) Oral once  dextrose 5%. 1000 milliLiter(s) IV Continuous <Continuous>  dextrose 5%. 1000 milliLiter(s) IV Continuous <Continuous>  dextrose 50% Injectable 25 Gram(s) IV Push once  dextrose 50% Injectable 12.5 Gram(s) IV Push once  dextrose 50% Injectable 25 Gram(s) IV Push once  diltiazem    Tablet 30 milliGRAM(s) Oral four times a day  epoetin danilo-epbx (RETACRIT) Injectable 65264 Unit(s) IV Push <User Schedule>  glucagon  Injectable 1 milliGRAM(s) IntraMuscular once  heparin   Injectable 7000 Unit(s) IV Push every 6 hours PRN  heparin   Injectable 3500 Unit(s) IV Push every 6 hours PRN  heparin  Infusion. 700 Unit(s)/Hr IV Continuous <Continuous>  insulin lispro (ADMELOG) corrective regimen sliding scale   SubCutaneous every 6 hours  norepinephrine Infusion 0.05 MICROgram(s)/kG/Min IV Continuous <Continuous>  pantoprazole  Injectable 40 milliGRAM(s) IV Push every 12 hours         Family history: No history of dementia, strokes, or seizures   FAMILY HISTORY:  Family history of diabetes mellitus (Sibling)    Family history of coronary artery disease      SOCIAL HISTORY -- No history of tobacco or alcohol use     Allergies    lisinopril (Other)  opioid-like analgesics (Other)    Intolerances            Vital Signs Last 24 Hrs  T(C): 36.9 (17 Jul 2021 08:00), Max: 37.4 (16 Jul 2021 12:00)  T(F): 98.4 (17 Jul 2021 08:00), Max: 99.4 (16 Jul 2021 12:00)  HR: 77 (17 Jul 2021 08:00) (52 - 84)  BP: 144/50 (17 Jul 2021 08:00) (67/34 - 175/60)  BP(mean): 73 (17 Jul 2021 08:00) (40 - 89)  RR: 17 (17 Jul 2021 08:00) (14 - 31)  SpO2: 98% (17 Jul 2021 08:00) (91% - 100%)        On Neurological Examination:    Mental Status - Patient is opens eyes.   Follows no commands  Speech - no attempt to speak                         Cranial Nerves - Extraocular muscle intact  KATHY Facial symmetry Tongue midline, CnV1to V3 intact gross hearing intact      Motor Exam - trace movement all extremities     Sensory    withdraw     GENERAL Exam:     Nontoxic , No Acute Distress   	  HEENT:  normocephalic, atraumatic  		  LUNGS:	Clear bilaterally  No Wheeze      VASCULAR: no carotid brui  	  HEART:	 Normal S1S2   No murmur RRR        	  MUSCULOSKELETAL: Normal Range of Motion  	   SKIN:      Normal   No Ecchymosis               LABS:  CBC Full  -  ( 17 Jul 2021 06:27 )  WBC Count : 13.73 K/uL  RBC Count : 3.23 M/uL  Hemoglobin : 9.0 g/dL  Hematocrit : 31.1 %  Platelet Count - Automated : 163 K/uL  Mean Cell Volume : 96.3 fL  Mean Cell Hemoglobin : 27.9 pg  Mean Cell Hemoglobin Concentration : 28.9 gm/dL  Auto Neutrophil # : 11.39 K/uL  Auto Lymphocyte # : 0.64 K/uL  Auto Monocyte # : 1.35 K/uL  Auto Eosinophil # : 0.05 K/uL  Auto Basophil # : 0.05 K/uL  Auto Neutrophil % : 82.9 %  Auto Lymphocyte % : 4.7 %  Auto Monocyte % : 9.8 %  Auto Eosinophil % : 0.4 %  Auto Basophil % : 0.4 %      07-17    133<L>  |  94<L>  |  38<H>  ----------------------------<  213<H>  4.3   |  22  |  6.22<H>    Ca    8.8      17 Jul 2021 06:27    TPro  5.8<L>  /  Alb  2.7<L>  /  TBili  0.5  /  DBili  x   /  AST  25  /  ALT  6   /  AlkPhos  110  07-17    Hemoglobin A1C:     LIVER FUNCTIONS - ( 17 Jul 2021 06:27 )  Alb: 2.7 g/dL / Pro: 5.8 g/dL / ALK PHOS: 110 U/L / ALT: 6 U/L / AST: 25 U/L / GGT: x           Vitamin B12   PT/INR - ( 16 Jul 2021 11:16 )   PT: 16.4 sec;   INR: 1.46 ratio         PTT - ( 17 Jul 2021 06:27 )  PTT:31.1 sec      RADIOLOGY    EKG                [facility]

## 2021-07-17 NOTE — PROGRESS NOTE ADULT - SUBJECTIVE AND OBJECTIVE BOX
CARDIOLOGY ATTENDING    tele - SR    S: remains intubated; ros limited     Review of Systems:   Constitutional: [ ] fevers, [ ] chills.   Skin: [ ] dry skin. [ ] rashes.  Psychiatric: [ ] depression, [ ] anxiety.   Gastrointestinal: [ ] BRBPR, [ ] melena.   Neurological: [ ] confusion. [ ] seizures. [ ] shuffling gait.   Ears,Nose,Mouth and Throat: [ ] ear pain [ ] sore throat.   Eyes: [ ] diplopia.   Respiratory: [ ] hemoptysis. [ ] shortness of breath  Cardiovascular: See HPI above  Hematologic/Lymphatic: [ ] anemia. [ ] painful nodes. [ ] prolonged bleeding.   Genitourinary: [ ] hematuria. [ ] flank pain.   Endocrine: [ ] significant change in weight. [ ] intolerance to heat and cold.     Review of systems [ ] otherwise negative, [x ] otherwise unable to obtain    FH: no family history of sudden cardiac death in first degree relatives    SH: [ ] tobacco, [ ] alcohol, [ ] drugs        acetaminophen   Tablet .. 1000 milliGRAM(s) Oral every 6 hours PRN  aspirin  chewable 81 milliGRAM(s) Oral daily  atorvastatin 40 milliGRAM(s) Oral at bedtime  calamine/zinc oxide Lotion 1 Application(s) Topical three times a day PRN  chlorhexidine 0.12% Liquid 15 milliLiter(s) Oral Mucosa every 12 hours  chlorhexidine 2% Cloths 1 Application(s) Topical daily  dextrose 40% Gel 15 Gram(s) Oral once  dextrose 40% Gel 15 Gram(s) Oral once  dextrose 5%. 1000 milliLiter(s) IV Continuous <Continuous>  dextrose 5%. 1000 milliLiter(s) IV Continuous <Continuous>  dextrose 50% Injectable 25 Gram(s) IV Push once  dextrose 50% Injectable 12.5 Gram(s) IV Push once  dextrose 50% Injectable 25 Gram(s) IV Push once  diltiazem    Tablet 30 milliGRAM(s) Oral four times a day  epoetin danilo-epbx (RETACRIT) Injectable 62818 Unit(s) IV Push <User Schedule>  glucagon  Injectable 1 milliGRAM(s) IntraMuscular once  heparin   Injectable 7000 Unit(s) IV Push every 6 hours PRN  heparin   Injectable 3500 Unit(s) IV Push every 6 hours PRN  heparin  Infusion. 700 Unit(s)/Hr IV Continuous <Continuous>  insulin lispro (ADMELOG) corrective regimen sliding scale   SubCutaneous every 6 hours  norepinephrine Infusion 0.05 MICROgram(s)/kG/Min IV Continuous <Continuous>  pantoprazole  Injectable 40 milliGRAM(s) IV Push every 12 hours                            9.0    13.73 )-----------( 163      ( 17 Jul 2021 06:27 )             31.1       07-17    133<L>  |  94<L>  |  38<H>  ----------------------------<  213<H>  4.3   |  22  |  6.22<H>    Ca    8.8      17 Jul 2021 06:27    TPro  5.8<L>  /  Alb  2.7<L>  /  TBili  0.5  /  DBili  x   /  AST  25  /  ALT  6   /  AlkPhos  110  07-17            T(C): 36.4 (07-17-21 @ 16:04), Max: 37.4 (07-16-21 @ 20:00)  HR: 75 (07-17-21 @ 16:04) (58 - 84)  BP: 148/46 (07-17-21 @ 16:04) (109/63 - 175/60)  RR: 20 (07-17-21 @ 16:04) (14 - 31)  SpO2: 95% (07-17-21 @ 16:04) (95% - 100%)  Wt(kg): --    I&O's Summary    16 Jul 2021 07:01  -  17 Jul 2021 07:00  --------------------------------------------------------  IN: 873.8 mL / OUT: 0 mL / NET: 873.8 mL    17 Jul 2021 07:01  -  17 Jul 2021 17:50  --------------------------------------------------------  IN: 89 mL / OUT: 0 mL / NET: 89 mL          General: intubated; sedated   Head: Normocephalic and atraumatic.   Neck: No JVD. No bruits. Supple. Does not appear to be enlarged.   Cardiovascular: + S1,S2 ; RRR Soft systolic murmur at the left lower sternal border. No rubs noted.    Lungs: decreased BS BL  Abdomen: + BS, soft. Non tender. Non distended. No rebound. No guarding.   Extremities: No clubbing/cyanosis/edema.   Neurologic: unable to assess  Skin: Warm and moist. The patient's skin has normal elasticity and good skin turgor.   Psychiatric: unable to assess   Musculoskeletal: unable to assess    DATA  < from: Transthoracic Echocardiogram (07.07.21 @ 18:17) >  ------------------------------------------------------------------------  CONCLUSIONS:  1. Mitral annular calcification, otherwise normal mitral  valve. Minimal mitral regurgitation.  2. Endocardium not well visualized; grossly normal left  ventricular systolic function.  3. Right ventricular enlargement with decreased right  ventricular systolic function.  4. Inadequate tricuspid regurgitation Doppler envelope  precludes estimation of RVSP.  ------------------------------------------------------------------------  Confirmed on  7/7/2021 - 21:16:20 by Osvaldo Bertrand M.D.,  [facility], FASE    < end of copied text >        A/P) 69 y/o male PMH CAD s/p CABG and old PCI, AFL x 1 year on coumadin, HTN, hyperlipidemia, DM, ESRD on HD, and hypothyroidism originally admitted to [facility] with GIB, transferred to [facility], found to have septic shock and MRSA bacteremia.     -Events noted; pt. s/p PEA arrest   -Repeat TTE with only mild segmental LV dysfunction in the inferoseptum  -Given negative CPK, do not suspect acs  -Suspect elevated troponin in the setting of demand ischemia from underlying septic shock  -Pressor support per MICU  -Monitor MS - follow up neuro   -EP follow up appreciated   -Given MRSA bacteremia, check TRUMAN to rule out endocarditis if family agreeable - appreciate GI eval - cleared by GI for TRUMAN    -Abx per ID  -CTH head noted - pt. with cva - ac was initially held due to GIB and acute blood loss anemia at [facility] resulting in a 6 point drop in Hb - appreciate GI eval - cleared by GI to resume AC  -Follow up neuro   -Supportive care per MICU     Kalie Lau MD

## 2021-07-18 LAB
ALBUMIN SERPL ELPH-MCNC: 2.5 G/DL — LOW (ref 3.3–5)
ALP SERPL-CCNC: 105 U/L — SIGNIFICANT CHANGE UP (ref 40–120)
ALT FLD-CCNC: <5 U/L — SIGNIFICANT CHANGE UP (ref 4–41)
ANION GAP SERPL CALC-SCNC: 13 MMOL/L — SIGNIFICANT CHANGE UP (ref 7–14)
APTT BLD: 49.2 SEC — HIGH (ref 27–36.3)
APTT BLD: 50.7 SEC — HIGH (ref 27–36.3)
APTT BLD: 69.2 SEC — HIGH (ref 27–36.3)
AST SERPL-CCNC: 21 U/L — SIGNIFICANT CHANGE UP (ref 4–40)
BASOPHILS # BLD AUTO: 0.05 K/UL — SIGNIFICANT CHANGE UP (ref 0–0.2)
BASOPHILS NFR BLD AUTO: 0.4 % — SIGNIFICANT CHANGE UP (ref 0–2)
BILIRUB SERPL-MCNC: 0.4 MG/DL — SIGNIFICANT CHANGE UP (ref 0.2–1.2)
BUN SERPL-MCNC: 26 MG/DL — HIGH (ref 7–23)
CALCIUM SERPL-MCNC: 8.4 MG/DL — SIGNIFICANT CHANGE UP (ref 8.4–10.5)
CHLORIDE SERPL-SCNC: 96 MMOL/L — LOW (ref 98–107)
CO2 SERPL-SCNC: 26 MMOL/L — SIGNIFICANT CHANGE UP (ref 22–31)
CREAT SERPL-MCNC: 4.43 MG/DL — HIGH (ref 0.5–1.3)
EOSINOPHIL # BLD AUTO: 0.04 K/UL — SIGNIFICANT CHANGE UP (ref 0–0.5)
EOSINOPHIL NFR BLD AUTO: 0.3 % — SIGNIFICANT CHANGE UP (ref 0–6)
GLUCOSE BLDC GLUCOMTR-MCNC: 220 MG/DL — HIGH (ref 70–99)
GLUCOSE BLDC GLUCOMTR-MCNC: 254 MG/DL — HIGH (ref 70–99)
GLUCOSE BLDC GLUCOMTR-MCNC: 259 MG/DL — HIGH (ref 70–99)
GLUCOSE SERPL-MCNC: 195 MG/DL — HIGH (ref 70–99)
HCT VFR BLD CALC: 27.2 % — LOW (ref 39–50)
HGB BLD-MCNC: 7.9 G/DL — LOW (ref 13–17)
IANC: 10.47 K/UL — HIGH (ref 1.5–8.5)
IMM GRANULOCYTES NFR BLD AUTO: 0.8 % — SIGNIFICANT CHANGE UP (ref 0–1.5)
LYMPHOCYTES # BLD AUTO: 0.52 K/UL — LOW (ref 1–3.3)
LYMPHOCYTES # BLD AUTO: 4.2 % — LOW (ref 13–44)
MAGNESIUM SERPL-MCNC: 1.9 MG/DL — SIGNIFICANT CHANGE UP (ref 1.6–2.6)
MCHC RBC-ENTMCNC: 27.4 PG — SIGNIFICANT CHANGE UP (ref 27–34)
MCHC RBC-ENTMCNC: 29 GM/DL — LOW (ref 32–36)
MCV RBC AUTO: 94.4 FL — SIGNIFICANT CHANGE UP (ref 80–100)
MONOCYTES # BLD AUTO: 1.06 K/UL — HIGH (ref 0–0.9)
MONOCYTES NFR BLD AUTO: 8.7 % — SIGNIFICANT CHANGE UP (ref 2–14)
NEUTROPHILS # BLD AUTO: 10.47 K/UL — HIGH (ref 1.8–7.4)
NEUTROPHILS NFR BLD AUTO: 85.6 % — HIGH (ref 43–77)
NRBC # BLD: 0 /100 WBCS — SIGNIFICANT CHANGE UP
NRBC # FLD: 0 K/UL — SIGNIFICANT CHANGE UP
PHOSPHATE SERPL-MCNC: 3 MG/DL — SIGNIFICANT CHANGE UP (ref 2.5–4.5)
PLATELET # BLD AUTO: 114 K/UL — LOW (ref 150–400)
POTASSIUM SERPL-MCNC: 3.7 MMOL/L — SIGNIFICANT CHANGE UP (ref 3.5–5.3)
POTASSIUM SERPL-SCNC: 3.7 MMOL/L — SIGNIFICANT CHANGE UP (ref 3.5–5.3)
PROT SERPL-MCNC: 5.3 G/DL — LOW (ref 6–8.3)
RBC # BLD: 2.88 M/UL — LOW (ref 4.2–5.8)
RBC # FLD: 18.6 % — HIGH (ref 10.3–14.5)
SODIUM SERPL-SCNC: 135 MMOL/L — SIGNIFICANT CHANGE UP (ref 135–145)
VANCOMYCIN FLD-MCNC: 17.3 UG/ML — SIGNIFICANT CHANGE UP
WBC # BLD: 12.24 K/UL — HIGH (ref 3.8–10.5)
WBC # FLD AUTO: 12.24 K/UL — HIGH (ref 3.8–10.5)

## 2021-07-18 PROCEDURE — 99291 CRITICAL CARE FIRST HOUR: CPT

## 2021-07-18 RX ORDER — VANCOMYCIN HCL 1 G
750 VIAL (EA) INTRAVENOUS ONCE
Refills: 0 | Status: COMPLETED | OUTPATIENT
Start: 2021-07-18 | End: 2021-07-18

## 2021-07-18 RX ORDER — FENTANYL CITRATE 50 UG/ML
50 INJECTION INTRAVENOUS ONCE
Refills: 0 | Status: DISCONTINUED | OUTPATIENT
Start: 2021-07-18 | End: 2021-07-18

## 2021-07-18 RX ADMIN — PANTOPRAZOLE SODIUM 40 MILLIGRAM(S): 20 TABLET, DELAYED RELEASE ORAL at 17:21

## 2021-07-18 RX ADMIN — FENTANYL CITRATE 50 MICROGRAM(S): 50 INJECTION INTRAVENOUS at 22:53

## 2021-07-18 RX ADMIN — ATORVASTATIN CALCIUM 40 MILLIGRAM(S): 80 TABLET, FILM COATED ORAL at 22:54

## 2021-07-18 RX ADMIN — Medication 1: at 23:03

## 2021-07-18 RX ADMIN — HEPARIN SODIUM 2100 UNIT(S)/HR: 5000 INJECTION INTRAVENOUS; SUBCUTANEOUS at 11:08

## 2021-07-18 RX ADMIN — Medication 30 MILLIGRAM(S): at 05:43

## 2021-07-18 RX ADMIN — Medication 30 MILLIGRAM(S): at 12:02

## 2021-07-18 RX ADMIN — PANTOPRAZOLE SODIUM 40 MILLIGRAM(S): 20 TABLET, DELAYED RELEASE ORAL at 05:43

## 2021-07-18 RX ADMIN — CHLORHEXIDINE GLUCONATE 15 MILLILITER(S): 213 SOLUTION TOPICAL at 17:22

## 2021-07-18 RX ADMIN — HEPARIN SODIUM 1900 UNIT(S)/HR: 5000 INJECTION INTRAVENOUS; SUBCUTANEOUS at 04:15

## 2021-07-18 RX ADMIN — Medication 30 MILLIGRAM(S): at 23:01

## 2021-07-18 RX ADMIN — CHLORHEXIDINE GLUCONATE 1 APPLICATION(S): 213 SOLUTION TOPICAL at 11:58

## 2021-07-18 RX ADMIN — Medication 3: at 17:40

## 2021-07-18 RX ADMIN — Medication 4.04 MICROGRAM(S)/KG/MIN: at 07:06

## 2021-07-18 RX ADMIN — Medication 250 MILLIGRAM(S): at 15:46

## 2021-07-18 RX ADMIN — FENTANYL CITRATE 50 MICROGRAM(S): 50 INJECTION INTRAVENOUS at 22:56

## 2021-07-18 RX ADMIN — CHLORHEXIDINE GLUCONATE 15 MILLILITER(S): 213 SOLUTION TOPICAL at 05:43

## 2021-07-18 RX ADMIN — Medication 2: at 05:43

## 2021-07-18 RX ADMIN — Medication 3: at 11:54

## 2021-07-18 RX ADMIN — HEPARIN SODIUM 2300 UNIT(S)/HR: 5000 INJECTION INTRAVENOUS; SUBCUTANEOUS at 17:17

## 2021-07-18 RX ADMIN — Medication 30 MILLIGRAM(S): at 17:22

## 2021-07-18 RX ADMIN — Medication 81 MILLIGRAM(S): at 12:02

## 2021-07-18 NOTE — PROGRESS NOTE ADULT - SUBJECTIVE AND OBJECTIVE BOX
INTERVAL HPI/OVERNIGHT EVENTS:    intubated in micu   small light brown stool yesterday documented    MEDICATIONS  (STANDING):  aspirin  chewable 81 milliGRAM(s) Oral daily  atorvastatin 40 milliGRAM(s) Oral at bedtime  chlorhexidine 0.12% Liquid 15 milliLiter(s) Oral Mucosa every 12 hours  chlorhexidine 2% Cloths 1 Application(s) Topical daily  dextrose 40% Gel 15 Gram(s) Oral once  dextrose 40% Gel 15 Gram(s) Oral once  dextrose 5%. 1000 milliLiter(s) (50 mL/Hr) IV Continuous <Continuous>  dextrose 5%. 1000 milliLiter(s) (100 mL/Hr) IV Continuous <Continuous>  dextrose 50% Injectable 25 Gram(s) IV Push once  dextrose 50% Injectable 12.5 Gram(s) IV Push once  dextrose 50% Injectable 25 Gram(s) IV Push once  diltiazem    Tablet 30 milliGRAM(s) Oral four times a day  epoetin danilo-epbx (RETACRIT) Injectable 48443 Unit(s) IV Push <User Schedule>  glucagon  Injectable 1 milliGRAM(s) IntraMuscular once  heparin  Infusion. 700 Unit(s)/Hr (7 mL/Hr) IV Continuous <Continuous>  insulin lispro (ADMELOG) corrective regimen sliding scale   SubCutaneous every 6 hours  norepinephrine Infusion 0.05 MICROgram(s)/kG/Min (4.04 mL/Hr) IV Continuous <Continuous>  pantoprazole  Injectable 40 milliGRAM(s) IV Push every 12 hours    MEDICATIONS  (PRN):  acetaminophen   Tablet .. 1000 milliGRAM(s) Oral every 6 hours PRN Temp greater or equal to 38C (100.4F), Mild Pain (1 - 3), Moderate Pain (4 - 6)  calamine/zinc oxide Lotion 1 Application(s) Topical three times a day PRN Itching  heparin   Injectable 7000 Unit(s) IV Push every 6 hours PRN For aPTT less than 40  heparin   Injectable 3500 Unit(s) IV Push every 6 hours PRN For aPTT between 40 - 57      Allergies    lisinopril (Other)  opioid-like analgesics (Other)    Intolerances        Review of Systems: *pt minimally verbal to nonverbal, unable to obtain ROS         Vital Signs Last 24 Hrs  T(C): 37.3 (18 Jul 2021 08:00), Max: 37.8 (18 Jul 2021 04:00)  T(F): 99.1 (18 Jul 2021 08:00), Max: 100 (18 Jul 2021 04:00)  HR: 73 (18 Jul 2021 11:00) (56 - 83)  BP: 107/48 (18 Jul 2021 11:00) (107/48 - 154/55)  BP(mean): 64 (18 Jul 2021 11:00) (54 - 82)  RR: 17 (18 Jul 2021 11:00) (14 - 27)  SpO2: 100% (18 Jul 2021 11:00) (95% - 100%)    PHYSICAL EXAM:    Constitutional: NAD  HEENT: EOMI, throat clear  Neck: No LAD, supple  Respiratory: intubated   Cardiovascular: S1 and S2, RRR, no M  Gastrointestinal: BS+, soft, NT/ND, neg HSM,  Extremities: No peripheral edema, neg clubbing, cyanosis  Vascular: 2+ peripheral pulses  Neurological: A/O x 0  Psychiatric: lethargic/intubated  Skin: No rashes      LABS:                        7.9    12.24 )-----------( 114      ( 18 Jul 2021 03:44 )             27.2     07-18    135  |  96<L>  |  26<H>  ----------------------------<  195<H>  3.7   |  26  |  4.43<H>    Ca    8.4      18 Jul 2021 03:44  Phos  3.0     07-18  Mg     1.90     07-18    TPro  5.3<L>  /  Alb  2.5<L>  /  TBili  0.4  /  DBili  x   /  AST  21  /  ALT  <5  /  AlkPhos  105  07-18    PTT - ( 18 Jul 2021 10:32 )  PTT:49.2 sec      RADIOLOGY & ADDITIONAL TESTS:

## 2021-07-18 NOTE — PROGRESS NOTE ADULT - SUBJECTIVE AND OBJECTIVE BOX
69 yo M with PMhx of CAD s/p CABG (course at that time complicated by pleural effusion requiring chest tubes), ESRD on HD (MWF), DMII, HTN, afib on coumadin, and anemia presented from [facility] for evaluation of chest pain and GIB. Patient is AAOX2 and was able to provide some information. Most of the information was obtained from charting. Patient was admitted to [facility] after developing chest pain during HD. He was found to have Hgb of 6 and elevated trops concerning for GIB and NSTEM II. He was give 2 units of pRBCs and monitored off warfarin/aspirin. He underwent bowel prep but it did not show any bloody stool. As a result, no colonoscopy was performed. His course was complicated by aflutter/afib with RVR. He was managed with metoprolol and never required cardioversion. On day of transfer, his Hgb was in the 8s. Patient was deemed stable enough to be restarted on aspirin and warfarin. Patient was transferred to [facility] for further evaluation. Currently, patient have no symptoms but reports to have intermittent chest pain, that is suprasternal, non-radiating, exertional at times, and non-reproducible on palpation. it improves with rest and worsens with movement. He denies any fever, chills, cough, orthopnea, PND, LE edema, abdominal pain, diarrhea, or dysuria.    On the floor, vitals are WNL.  (03 Jul 2021 21:11)      Past Medical History  HTN (Hypertension)    DM (Diabetes Mellitus)    CAD (Coronary Artery Disease)    Hypercholesterolemia    Coronary Stent    Heart Attack    H/O: CVA    CKD (chronic kidney disease)    Hyperthyroidism    Stented coronary artery    ESRD (end stage renal disease) on dialysis        Past Surgical History  Boil of Buttock    S/P cataract extraction    Hemodialysis access, AV graft    S/P CABG x 4        MEDICATIONS    acetaminophen   Tablet .. 1000 milliGRAM(s) Oral every 6 hours PRN  aspirin  chewable 81 milliGRAM(s) Oral daily  atorvastatin 40 milliGRAM(s) Oral at bedtime  calamine/zinc oxide Lotion 1 Application(s) Topical three times a day PRN  chlorhexidine 0.12% Liquid 15 milliLiter(s) Oral Mucosa every 12 hours  chlorhexidine 2% Cloths 1 Application(s) Topical daily  dextrose 40% Gel 15 Gram(s) Oral once  dextrose 40% Gel 15 Gram(s) Oral once  dextrose 5%. 1000 milliLiter(s) IV Continuous <Continuous>  dextrose 5%. 1000 milliLiter(s) IV Continuous <Continuous>  dextrose 50% Injectable 25 Gram(s) IV Push once  dextrose 50% Injectable 12.5 Gram(s) IV Push once  dextrose 50% Injectable 25 Gram(s) IV Push once  diltiazem    Tablet 30 milliGRAM(s) Oral four times a day  epoetin danilo-epbx (RETACRIT) Injectable 53419 Unit(s) IV Push <User Schedule>  glucagon  Injectable 1 milliGRAM(s) IntraMuscular once  heparin   Injectable 7000 Unit(s) IV Push every 6 hours PRN  heparin   Injectable 3500 Unit(s) IV Push every 6 hours PRN  heparin  Infusion. 700 Unit(s)/Hr IV Continuous <Continuous>  insulin lispro (ADMELOG) corrective regimen sliding scale   SubCutaneous every 6 hours  norepinephrine Infusion 0.05 MICROgram(s)/kG/Min IV Continuous <Continuous>  pantoprazole  Injectable 40 milliGRAM(s) IV Push every 12 hours         Family history: No history of dementia, strokes, or seizures   FAMILY HISTORY:  Family history of diabetes mellitus (Sibling)    Family history of coronary artery disease      SOCIAL HISTORY -- No history of tobacco or alcohol use     Allergies    lisinopril (Other)  opioid-like analgesics (Other)    Intolerances            Vital Signs Last 24 Hrs  T(C): 36.9 (17 Jul 2021 08:00), Max: 37.4 (16 Jul 2021 12:00)  T(F): 98.4 (17 Jul 2021 08:00), Max: 99.4 (16 Jul 2021 12:00)  HR: 77 (17 Jul 2021 08:00) (52 - 84)  BP: 144/50 (17 Jul 2021 08:00) (67/34 - 175/60)  BP(mean): 73 (17 Jul 2021 08:00) (40 - 89)  RR: 17 (17 Jul 2021 08:00) (14 - 31)  SpO2: 98% (17 Jul 2021 08:00) (91% - 100%)        On Neurological Examination:    Mental Status - Patient is opens eyes.   Follows no commands  Speech - no attempt to speak                         Cranial Nerves - Extraocular muscle intact  KATHY Facial symmetry Tongue midline, CnV1to V3 intact gross hearing intact      Motor Exam - trace movement all extremities     Sensory    withdraw     GENERAL Exam:     Nontoxic , No Acute Distress   	  HEENT:  normocephalic, atraumatic  		  LUNGS:	Clear bilaterally  No Wheeze      VASCULAR: no carotid brui  	  HEART:	 Normal S1S2   No murmur RRR        	  MUSCULOSKELETAL: Normal Range of Motion  	   SKIN:      Normal   No Ecchymosis               LABS:  CBC Full  -  ( 17 Jul 2021 06:27 )  WBC Count : 13.73 K/uL  RBC Count : 3.23 M/uL  Hemoglobin : 9.0 g/dL  Hematocrit : 31.1 %  Platelet Count - Automated : 163 K/uL  Mean Cell Volume : 96.3 fL  Mean Cell Hemoglobin : 27.9 pg  Mean Cell Hemoglobin Concentration : 28.9 gm/dL  Auto Neutrophil # : 11.39 K/uL  Auto Lymphocyte # : 0.64 K/uL  Auto Monocyte # : 1.35 K/uL  Auto Eosinophil # : 0.05 K/uL  Auto Basophil # : 0.05 K/uL  Auto Neutrophil % : 82.9 %  Auto Lymphocyte % : 4.7 %  Auto Monocyte % : 9.8 %  Auto Eosinophil % : 0.4 %  Auto Basophil % : 0.4 %      07-17    133<L>  |  94<L>  |  38<H>  ----------------------------<  213<H>  4.3   |  22  |  6.22<H>    Ca    8.8      17 Jul 2021 06:27    TPro  5.8<L>  /  Alb  2.7<L>  /  TBili  0.5  /  DBili  x   /  AST  25  /  ALT  6   /  AlkPhos  110  07-17    Hemoglobin A1C:     LIVER FUNCTIONS - ( 17 Jul 2021 06:27 )  Alb: 2.7 g/dL / Pro: 5.8 g/dL / ALK PHOS: 110 U/L / ALT: 6 U/L / AST: 25 U/L / GGT: x           Vitamin B12   PT/INR - ( 16 Jul 2021 11:16 )   PT: 16.4 sec;   INR: 1.46 ratio         PTT - ( 17 Jul 2021 06:27 )  PTT:31.1 sec      RADIOLOGY    EKG                schoenberg     Assessment and Plan:   · Assessment	  69 yo M with PMhx of CAD s/p CABG (course at that time complicated by pleural effusion requiring chest tubes), ESRD on HD (MWF), DMII, HTN, afib on coumadin, and anemia presented from [facility] for evaluation of chest pain and GIB. 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 Patient is AAOX2 and was able to provide some information. Most of the information was obtained from charting. Patient was admitted to [facility] after developing chest pain during HD. He was found to have Hgb of 6 and elevated trops concerning for GIB and NSTEM II. He was give 2 units of pRBCs and monitored off warfarin/aspirin. He underwent bowel prep but it did not show any bloody stool. As a result, no colonoscopy was performed. His course was complicated by aflutter/afib with RVR. He was managed with metoprolol and never required cardioversion. On day of transfer, his Hgb was in the 8s. Patient was deemed stable enough to be restarted on aspirin and warfarin. Patient was transferred to [facility] for further evaluation. Currently, patient have no symptoms but reports to have intermittent chest pain, that is suprasternal, non-radiating, exertional at times, and non-reproducible on palpation. it improves with rest and worsens with movement. He denies any fever, chills, cough, orthopnea, PND, LE edema, abdominal pain, diarrhea, or dysuria.    On the floor, vitals are WNL.  (03 Jul 2021 21:11)      Past Medical History  HTN (Hypertension)    DM (Diabetes Mellitus)    CAD (Coronary Artery Disease)    Hypercholesterolemia    Coronary Stent    Heart Attack    H/O: CVA    CKD (chronic kidney disease)    Hyperthyroidism    Stented coronary artery    ESRD (end stage renal disease) on dialysis        Past Surgical History  Boil of Buttock    S/P cataract extraction    Hemodialysis access, AV graft    S/P CABG x 4        MEDICATIONS    acetaminophen   Tablet .. 1000 milliGRAM(s) Oral every 6 hours PRN  aspirin  chewable 81 milliGRAM(s) Oral daily  atorvastatin 40 milliGRAM(s) Oral at bedtime  calamine/zinc oxide Lotion 1 Application(s) Topical three times a day PRN  chlorhexidine 0.12% Liquid 15 milliLiter(s) Oral Mucosa every 12 hours  chlorhexidine 2% Cloths 1 Application(s) Topical daily  dextrose 40% Gel 15 Gram(s) Oral once  dextrose 40% Gel 15 Gram(s) Oral once  dextrose 5%. 1000 milliLiter(s) IV Continuous <Continuous>  dextrose 5%. 1000 milliLiter(s) IV Continuous <Continuous>  dextrose 50% Injectable 25 Gram(s) IV Push once  dextrose 50% Injectable 12.5 Gram(s) IV Push once  dextrose 50% Injectable 25 Gram(s) IV Push once  diltiazem    Tablet 30 milliGRAM(s) Oral four times a day  epoetin danilo-epbx (RETACRIT) Injectable 85939 Unit(s) IV Push <User Schedule>  glucagon  Injectable 1 milliGRAM(s) IntraMuscular once  heparin   Injectable 7000 Unit(s) IV Push every 6 hours PRN  heparin   Injectable 3500 Unit(s) IV Push every 6 hours PRN  heparin  Infusion. 700 Unit(s)/Hr IV Continuous <Continuous>  insulin lispro (ADMELOG) corrective regimen sliding scale   SubCutaneous every 6 hours  norepinephrine Infusion 0.05 MICROgram(s)/kG/Min IV Continuous <Continuous>  pantoprazole  Injectable 40 milliGRAM(s) IV Push every 12 hours         Family history: No history of dementia, strokes, or seizures   FAMILY HISTORY:  Family history of diabetes mellitus (Sibling)    Family history of coronary artery disease      SOCIAL HISTORY -- No history of tobacco or alcohol use     Allergies    lisinopril (Other)  opioid-like analgesics (Other)    Intolerances            Vital Signs Last 24 Hrs  T(C): 36.8  HR: 78  /66  RR: 17    On Neurological Examination:    Mental Status - Patient is opens eyes.   Follows no commands  Speech - no attempt to speak                         Cranial Nerves - Extraocular muscle intact  KATHY Facial symmetry Tongue midline, CnV1to V3 intact gross hearing intact      Motor Exam - trace movement all extremities     Sensory    withdraw     GENERAL Exam:     Nontoxic , No Acute Distress   	  HEENT:  normocephalic, atraumatic  		  LUNGS:	Clear bilaterally  No Wheeze      VASCULAR: no carotid brui  	  HEART:	 Normal S1S2   No murmur RRR        	  MUSCULOSKELETAL: Normal Range of Motion  	   SKIN:      Normal   No Ecchymosis               LABS:  CBC Full  -  ( 17 Jul 2021 06:27 )  WBC Count : 13.73 K/uL  RBC Count : 3.23 M/uL  Hemoglobin : 9.0 g/dL  Hematocrit : 31.1 %  Platelet Count - Automated : 163 K/uL  Mean Cell Volume : 96.3 fL  Mean Cell Hemoglobin : 27.9 pg  Mean Cell Hemoglobin Concentration : 28.9 gm/dL  Auto Neutrophil # : 11.39 K/uL  Auto Lymphocyte # : 0.64 K/uL  Auto Monocyte # : 1.35 K/uL  Auto Eosinophil # : 0.05 K/uL  Auto Basophil # : 0.05 K/uL  Auto Neutrophil % : 82.9 %  Auto Lymphocyte % : 4.7 %  Auto Monocyte % : 9.8 %  Auto Eosinophil % : 0.4 %  Auto Basophil % : 0.4 %      07-17    133<L>  |  94<L>  |  38<H>  ----------------------------<  213<H>  4.3   |  22  |  6.22<H>    Ca    8.8      17 Jul 2021 06:27    TPro  5.8<L>  /  Alb  2.7<L>  /  TBili  0.5  /  DBili  x   /  AST  25  /  ALT  6   /  AlkPhos  110  07-17    Hemoglobin A1C:     LIVER FUNCTIONS - ( 17 Jul 2021 06:27 )  Alb: 2.7 g/dL / Pro: 5.8 g/dL / ALK PHOS: 110 U/L / ALT: 6 U/L / AST: 25 U/L / GGT: x           Vitamin B12   PT/INR - ( 16 Jul 2021 11:16 )   PT: 16.4 sec;   INR: 1.46 ratio         PTT - ( 17 Jul 2021 06:27 )  PTT:31.1 sec      RADIOLOGY    EKG                schoenberg     Assessment and Plan:   · Assessment	  69 yo M with PMhx of CAD s/p CABG (course at that time complicated by pleural effusion requiring chest tubes), ESRD on HD (MWF), DMII, HTN, afib on coumadin, and anemia presented from [facility] for evaluation of chest pain and GIB. 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

## 2021-07-18 NOTE — PROGRESS NOTE ADULT - ASSESSMENT
Assessment and Plan: 	  69 yo M with PMhx of CAD s/p CABG (course at that time complicated by pleural effusion requiring chest tubes), ESRD on HD (MWF), DMII, HTN, afib on coumadin, and anemia transferred from [facility] for evaluation of chest pain after HD and GIB s/p 2U PRBC transfusion and aflutter/afib with RVR managed with metoprolol. Admitted to MICU for hypotension dependent on pressors likely 2/2 to sepsis. In MICU, patient had a cardiac arrest on 7/13 requiring CPR, 3x epi, and 2x shock (for VTach).    #Neuro  - Off of sedation. Patient opens eyes on command and moves his toes. He is not able move his fingers or   - CT head shows foci of low density in the R anterolateral frontal lobe and L posterior temporal lobe concerning for acute infarcts.  - Mechanism likely cardioembolic in setting of patient with known atrial fibrillation with cardiac arrest vs septic emboli  - per neurology, can consider MRI brain w/o contrast. However, unlikely to  since patient has known atrial fibrillation and will need to be continued of full dose anticoagulation.  - Avoid hypotension, keep -180 for neuroprotection  - c/w Atorvastatin 40MG QHS for secondary stroke prevention.    #Cardiovascular  - Cardiac arrest: Patient had asystole night of 7/13 and CPR was started. S/p epi x3 and shock x2 with return to NSR. Cards is following  - TTE on 7/13 showed Mild mitral regurgitation with biventricular dysfunction of LV and RV systolic function. Endocardium was not well visualized, Hypokinesis of the basal inferior wall and basal inferoseptum.  - TRUMAN scheduled for monday morning, per cardiology.   - c/w Levophed wean as tolerated.   - BP goal 140-180 to maintain cerebral perfusion after stroke  - Will consider switching to midodrine once HR stable  - Troponins 2400, CKMB 7.1. Troponin stable from prior to cardiac arrest. They are likely 2/2 chronic condition/ESRD on HD vs Hypotensive event. Will continue to trend and monitor.    A. Fib  - on coumadin at home,  - c/ heparin gtt prophylaxis. PTT improved, patient on heparin. Awaiting TRUMAN for monday.   - From neurology stand point, heparin is sufficient for now. Will need to be switched to DOAC in the future if kidney function allows  - 7/11: tried to transition heparin gtt to eliquis but pt is not a candidate due to his acute MIGUEL; back on heparin gtt  - c/ diltiazem for rate control    #Respiratory  - Loculated Pleural effusions: chest tube removed   - pleural fluid cx: (-); transudative.   - intubated and on ventilator (24/400/5/40). F/u ABG and adjust as necessary  - CXR s/p chest tube removal showed no evidence of atelectasis. Demonstrated loculated left pleural effusion decreased in size and new hazy right lower lung opacity which could be due to a small layering right pleural effusion with associated passive atelectasis, atelectasis of other cause, and/or pneumonia.  - abx as below    #GI/Nutrition:  - NPO with tube feed. Hold tube feed night of 7/18 for TRUMAN on 7/19  - Was c/o abdominal pain on presentation, bedside POCUS didn't show ascites. No abdominal pain at this time. Continue to monitor.   - negative occult bleeding, hgb stable  - Per GI, patient is stable to get TRUMAN and does not need endoscopic evaluation prior to TRUMAN.   - Zofran PRN for Nausea.    #/Renal  - ESRD: HD on MWF. Most recent Scr 6.40. Potassium is stable.   - Last dialysis session 7/15. Per nephro, will receive dialysis again today  - Electrolytes stable  - c/w Epo 20k tiw with HD      #Skin  - HD Access: fistula in left upper arm  - Improving erythematous and tender on right wrist. Continue to monitor. RUE US neg for thrombus  - US of fistula showed patent fistula with no abscess      #ID  - S aureus PCR: (+),  MRSA PCR negative  - BCx on 7/7 now growing MRSA: restarted on vanc (7/11- )  - Vanc level on 7/13 was 24.8. f/u level today  - Vanco today was 22.1. Will hold vanco today. Recheck level tomorrow  - Surveillance blood Cx have been negative for 48hours, no longer need to continue    #Endocrine  - DMII: c/w Lispro SS. Monitor glucose.      #Hematologic/DVT ppx  -pTT yesterday >200. Held heparin yesterday. pTT today 31.1. Restarted heparin for prophylaxis      #Ethics  - full code as per wife- full code as per wife Assessment and Plan: 	  71 yo M with PMhx of CAD s/p CABG (course at that time complicated by pleural effusion requiring chest tubes), ESRD on HD (MWF), DMII, HTN, afib on coumadin, and anemia transferred from [facility] for evaluation of chest pain after HD and GIB s/p 2U PRBC transfusion and aflutter/afib with RVR managed with metoprolol. Admitted to MICU for hypotension dependent on pressors likely 2/2 to sepsis. In MICU, patient had a cardiac arrest on 7/13 requiring CPR, 3x epi, and 2x shock (for VTach).    #Neuro  - Off of sedation. Patient opens eyes on command and moves his toes. He is not able move his fingers or   - CT head shows foci of low density in the R anterolateral frontal lobe and L posterior temporal lobe concerning for acute infarcts.  - Mechanism likely cardioembolic in setting of patient with known atrial fibrillation with cardiac arrest vs septic emboli  - per neurology, can consider MRI brain w/o contrast. However, unlikely to  since patient has known atrial fibrillation and will need to be continued of full dose anticoagulation.  - Avoid hypotension, keep -180 for neuroprotection  - c/w Atorvastatin 40MG QHS for secondary stroke prevention.    #Cardiovascular  - Cardiac arrest: Patient had asystole night of 7/13 and CPR was started. S/p epi x3 and shock x2 with return to NSR. Cards is following  - TTE on 7/13 showed Mild mitral regurgitation with biventricular dysfunction of LV and RV systolic function. Endocardium was not well visualized, Hypokinesis of the basal inferior wall and basal inferoseptum.  - TRUMAN scheduled for monday morning, per cardiology.   - c/w Levophed wean as tolerated.   - BP goal 140-180 to maintain cerebral perfusion after stroke  - Will consider switching to midodrine once HR stable  - Troponins 2400, CKMB 7.1. Troponin stable from prior to cardiac arrest. They are likely 2/2 chronic condition/ESRD on HD vs Hypotensive event. Will continue to trend and monitor.  - TRUMAN scheduled 7/19; heparin drip stopped 7/19 at 6am    A. Fib  - on coumadin at home,  - c/ heparin gtt prophylaxis. PTT improved, patient on heparin. Awaiting TRUMAN for monday.   - From neurology stand point, heparin is sufficient for now. Will need to be switched to DOAC in the future if kidney function allows  - 7/11: tried to transition heparin gtt to eliquis but pt is not a candidate due to his acute MIGUEL; back on heparin gtt  - c/ diltiazem for rate control    #Respiratory  - Loculated Pleural effusions: chest tube removed   - pleural fluid cx: (-); transudative.   - intubated and on ventilator (24/400/5/40). F/u ABG and adjust as necessary  - CXR s/p chest tube removal showed no evidence of atelectasis. Demonstrated loculated left pleural effusion decreased in size and new hazy right lower lung opacity which could be due to a small layering right pleural effusion with associated passive atelectasis, atelectasis of other cause, and/or pneumonia.  - abx as below    #GI/Nutrition:  - NPO with tube feed. Hold tube feed night of 7/18 for TRUMAN on 7/19  - Was c/o abdominal pain on presentation, bedside POCUS didn't show ascites. No abdominal pain at this time. Continue to monitor.   - negative occult bleeding, hgb stable  - Per GI, patient is stable to get TRUMAN and does not need endoscopic evaluation prior to TRUMAN.   - Zofran PRN for Nausea.    #/Renal  - ESRD: HD on MWF. Most recent Scr 6.40. Potassium is stable.   - Last dialysis session 7/15. Per nephro, will received dialysis again 7/17  - Electrolytes stable  - c/w Epo 20k tiw with HD      #Skin  - HD Access: fistula in left upper arm  - Improving erythematous and tender on right wrist. Continue to monitor. RUE US neg for thrombus  - US of fistula showed patent fistula with no abscess      #ID  - S aureus PCR: (+),  MRSA PCR negative  - BCx on 7/7 now growing MRSA: restarted on vanc (7/11- )  - Vanc 7/18 was 17.3, received vanc 750mg ivpb (15mg/kg dose)  - Surveillance blood Cx have been negative for 48hours, no longer need to continue    #Endocrine  - DMII: c/w Lispro SS. Monitor glucose.      #Hematologic/DVT ppx  -monitor PTT        #Ethics  - full code as per wife- full code as per wife

## 2021-07-18 NOTE — PROGRESS NOTE ADULT - ATTENDING COMMENTS
70 M with CABG, ESRD on HD, known history of L pleural effusion, afib on a/c here with chest pain and concern for GIB, now with septic shock due to MRSA bacteremia requiring pressors, c/b cardiac arrest after ?Gm/vagal episode with ROSC, now acute hypoxemic respiratory failure due to likely aspiration pneumonitis.    Likely TRUMAN on Monday given concern for endocarditis, surveillance cultures negative thus far  Received HD yesterday, Vanco level was normal after HD so no additional dose was given  h/h stable  wean sedation, try to assess mental status, start PS trials .

## 2021-07-18 NOTE — PROGRESS NOTE ADULT - ASSESSMENT
70 year old male with possible GI bleed     Constipation   Recommend starting bowel regimen as below   Senna qhs and titrate Miralax qd to bid as needed   Suppository vs Enema as needed     Anemia  negative occult w/o overt gi bleeding  Monitor CBC and Keep hemoglobin > 8 given cardiac issues   PPI BID w/Maalox PRN    Monitor stool color (brown)  No GI objection to ASA or Heparin gtt while continuing ppi therapy  No GI objection to proceeding w/TRUMAN     Ascites  2/2 RHF w/dilated IVC on CT   diuresis per cardiology     Cardiac Arrest  Care per cardiology and MICU appreciated     CVA, Embolic   care per neurology appreciated     Advanced care planning was discussed with patient.  Advanced care planning forms were reviewed and discussed.  Risks, benefits and alternatives of gastroenterologic procedures were discussed in detail and all questions were answered.  30 minutes spent.

## 2021-07-18 NOTE — PROGRESS NOTE ADULT - SUBJECTIVE AND OBJECTIVE BOX
CARDIOLOGY ATTENDING    tele - SR    S: remains intubated but is moving extremities today; ros limited     Review of Systems:   Constitutional: [ ] fevers, [ ] chills.   Skin: [ ] dry skin. [ ] rashes.  Psychiatric: [ ] depression, [ ] anxiety.   Gastrointestinal: [ ] BRBPR, [ ] melena.   Neurological: [ ] confusion. [ ] seizures. [ ] shuffling gait.   Ears,Nose,Mouth and Throat: [ ] ear pain [ ] sore throat.   Eyes: [ ] diplopia.   Respiratory: [ ] hemoptysis. [ ] shortness of breath  Cardiovascular: See HPI above  Hematologic/Lymphatic: [ ] anemia. [ ] painful nodes. [ ] prolonged bleeding.   Genitourinary: [ ] hematuria. [ ] flank pain.   Endocrine: [ ] significant change in weight. [ ] intolerance to heat and cold.     Review of systems [ ] otherwise negative, [x ] otherwise unable to obtain    FH: no family history of sudden cardiac death in first degree relatives    SH: [ ] tobacco, [ ] alcohol, [ ] drugs      acetaminophen   Tablet .. 1000 milliGRAM(s) Oral every 6 hours PRN  aspirin  chewable 81 milliGRAM(s) Oral daily  atorvastatin 40 milliGRAM(s) Oral at bedtime  calamine/zinc oxide Lotion 1 Application(s) Topical three times a day PRN  chlorhexidine 0.12% Liquid 15 milliLiter(s) Oral Mucosa every 12 hours  chlorhexidine 2% Cloths 1 Application(s) Topical daily  dextrose 40% Gel 15 Gram(s) Oral once  dextrose 40% Gel 15 Gram(s) Oral once  dextrose 5%. 1000 milliLiter(s) IV Continuous <Continuous>  dextrose 5%. 1000 milliLiter(s) IV Continuous <Continuous>  dextrose 50% Injectable 25 Gram(s) IV Push once  dextrose 50% Injectable 12.5 Gram(s) IV Push once  dextrose 50% Injectable 25 Gram(s) IV Push once  diltiazem    Tablet 30 milliGRAM(s) Oral four times a day  epoetin danilo-epbx (RETACRIT) Injectable 73440 Unit(s) IV Push <User Schedule>  glucagon  Injectable 1 milliGRAM(s) IntraMuscular once  heparin   Injectable 7000 Unit(s) IV Push every 6 hours PRN  heparin   Injectable 3500 Unit(s) IV Push every 6 hours PRN  heparin  Infusion. 700 Unit(s)/Hr IV Continuous <Continuous>  insulin lispro (ADMELOG) corrective regimen sliding scale   SubCutaneous every 6 hours  norepinephrine Infusion 0.05 MICROgram(s)/kG/Min IV Continuous <Continuous>  pantoprazole  Injectable 40 milliGRAM(s) IV Push every 12 hours                            7.9    12.24 )-----------( 114      ( 18 Jul 2021 03:44 )             27.2       07-18    135  |  96<L>  |  26<H>  ----------------------------<  195<H>  3.7   |  26  |  4.43<H>    Ca    8.4      18 Jul 2021 03:44  Phos  3.0     07-18  Mg     1.90     07-18    TPro  5.3<L>  /  Alb  2.5<L>  /  TBili  0.4  /  DBili  x   /  AST  21  /  ALT  <5  /  AlkPhos  105  07-18            T(C): 37.2 (07-18-21 @ 12:00), Max: 37.8 (07-18-21 @ 04:00)  HR: 72 (07-18-21 @ 16:00) (56 - 83)  BP: 129/45 (07-18-21 @ 16:00) (107/48 - 154/55)  RR: 18 (07-18-21 @ 16:00) (14 - 27)  SpO2: 99% (07-18-21 @ 16:00) (95% - 100%)  Wt(kg): --    I&O's Summary    17 Jul 2021 07:01  -  18 Jul 2021 07:00  --------------------------------------------------------  IN: 1372.8 mL / OUT: 1000 mL / NET: 372.8 mL    18 Jul 2021 07:01  -  18 Jul 2021 16:15  --------------------------------------------------------  IN: 814.6 mL / OUT: 0 mL / NET: 814.6 mL          General: intubated; sedated   Head: Normocephalic and atraumatic.   Neck: No JVD. No bruits. Supple. Does not appear to be enlarged.   Cardiovascular: + S1,S2 ; RRR Soft systolic murmur at the left lower sternal border. No rubs noted.    Lungs: decreased BS BL  Abdomen: + BS, soft. Non tender. Non distended. No rebound. No guarding.   Extremities: No clubbing/cyanosis/edema.   Neurologic: unable to assess  Skin: Warm and moist. The patient's skin has normal elasticity and good skin turgor.   Psychiatric: unable to assess   Musculoskeletal: unable to assess    DATA  < from: Transthoracic Echocardiogram (07.07.21 @ 18:17) >  ------------------------------------------------------------------------  CONCLUSIONS:  1. Mitral annular calcification, otherwise normal mitral  valve. Minimal mitral regurgitation.  2. Endocardium not well visualized; grossly normal left  ventricular systolic function.  3. Right ventricular enlargement with decreased right  ventricular systolic function.  4. Inadequate tricuspid regurgitation Doppler envelope  precludes estimation of RVSP.  ------------------------------------------------------------------------  Confirmed on  7/7/2021 - 21:16:20 by Osvaldo Bertrand M.D.,  [facility], [facility]    < end of copied text >        A/P) 69 y/o male PMH CAD s/p CABG and old PCI, AFL x 1 year on coumadin, HTN, hyperlipidemia, DM, ESRD on HD, and hypothyroidism originally admitted to [facility] with GIB, transferred to [facility], found to have septic shock and MRSA bacteremia.     -Events noted; pt. s/p PEA arrest   -Repeat TTE with only mild segmental LV dysfunction in the inferoseptum  -Given negative CPK, do not suspect acs  -Suspect elevated troponin in the setting of demand ischemia from underlying septic shock  -Pressor support per MICU  -Monitor MS - follow up neuro   -EP follow up appreciated   -Given MRSA bacteremia, check TRUMAN to rule out endocarditis if family agreeable - appreciate GI eval - cleared by GI for TRUMAN    -Abx per ID  -Monitor BCx   -CTH head noted - pt. with cva - ac was initially held due to GIB and acute blood loss anemia at [facility] resulting in a 6 point drop in Hb - appreciate GI eval - cleared by GI to resume AC  -Follow up neuro   -Supportive care per MICU     Kalie Lau MD

## 2021-07-18 NOTE — PROGRESS NOTE ADULT - SUBJECTIVE AND OBJECTIVE BOX
INTERVAL HPI/OVERNIGHT EVENTS: No acute events O/N.    SUBJECTIVE: Patient seen and examined at bedside. Patient is intubated, off of sedation    ROS: Unable to assess    OBJECTIVE:    VITAL SIGNS:  ICU Vital Signs Last 24 Hrs  T(C): 37 (17 Jul 2021 06:00), Max: 37.4 (16 Jul 2021 12:00)  T(F): 98.6 (17 Jul 2021 06:00), Max: 99.4 (16 Jul 2021 12:00)  HR: 74 (17 Jul 2021 07:47) (52 - 84)  BP: 137/49 (17 Jul 2021 07:00) (67/34 - 175/60)  BP(mean): 69 (17 Jul 2021 07:00) (40 - 89)  RR: 20 (17 Jul 2021 07:00) (14 - 31)  SpO2: 99% (17 Jul 2021 07:47) (91% - 100%)    Mode: AC/ CMV (Assist Control/ Continuous Mandatory Ventilation), RR (machine): 14, TV (machine): 400, FiO2: 40, PEEP: 5, ITime: 0.8, MAP: 10, PIP: 29    07-16 @ 07:01  -  07-17 @ 07:00  --------------------------------------------------------  IN: 873.8 mL / OUT: 0 mL / NET: 873.8 mL      CAPILLARY BLOOD GLUCOSE      POCT Blood Glucose.: 213 mg/dL (17 Jul 2021 06:24)      PHYSICAL EXAM:    General: NAD. Patient is awake, opens eyes, and follows command  HEENT: NC/AT; PERRL, clear conjunctiva  Neck: supple  Respiratory: CTA b/l  Cardiovascular: +S1/S2; RRR  Abdomen: soft, NT/ND; +BS x4  Extremities: WWP, 2+ peripheral pulses b/l; no LE edema  Skin: normal color and turgor; no rash  Neurological: Patient is intubated and not sedated      MEDICATIONS:  MEDICATIONS  (STANDING):  aspirin  chewable 81 milliGRAM(s) Oral daily  atorvastatin 40 milliGRAM(s) Oral at bedtime  chlorhexidine 0.12% Liquid 15 milliLiter(s) Oral Mucosa every 12 hours  chlorhexidine 2% Cloths 1 Application(s) Topical daily  dextrose 40% Gel 15 Gram(s) Oral once  dextrose 40% Gel 15 Gram(s) Oral once  dextrose 5%. 1000 milliLiter(s) (50 mL/Hr) IV Continuous <Continuous>  dextrose 5%. 1000 milliLiter(s) (100 mL/Hr) IV Continuous <Continuous>  dextrose 50% Injectable 25 Gram(s) IV Push once  dextrose 50% Injectable 12.5 Gram(s) IV Push once  dextrose 50% Injectable 25 Gram(s) IV Push once  diltiazem    Tablet 30 milliGRAM(s) Oral four times a day  epoetin danilo-epbx (RETACRIT) Injectable 72909 Unit(s) IV Push <User Schedule>  glucagon  Injectable 1 milliGRAM(s) IntraMuscular once  heparin  Infusion. 700 Unit(s)/Hr (7 mL/Hr) IV Continuous <Continuous>  insulin lispro (ADMELOG) corrective regimen sliding scale   SubCutaneous every 6 hours  norepinephrine Infusion 0.05 MICROgram(s)/kG/Min (4.04 mL/Hr) IV Continuous <Continuous>  pantoprazole  Injectable 40 milliGRAM(s) IV Push every 12 hours    MEDICATIONS  (PRN):  acetaminophen   Tablet .. 1000 milliGRAM(s) Oral every 6 hours PRN Temp greater or equal to 38C (100.4F), Mild Pain (1 - 3), Moderate Pain (4 - 6)  calamine/zinc oxide Lotion 1 Application(s) Topical three times a day PRN Itching  heparin   Injectable 7000 Unit(s) IV Push every 6 hours PRN For aPTT less than 40  heparin   Injectable 3500 Unit(s) IV Push every 6 hours PRN For aPTT between 40 - 57      ALLERGIES:  Allergies    lisinopril (Other)  opioid-like analgesics (Other)    Intolerances        LABS:                        9.0    13.73 )-----------( 163      ( 17 Jul 2021 06:27 )             31.1     07-17    133<L>  |  94<L>  |  38<H>  ----------------------------<  213<H>  4.3   |  22  |  6.22<H>    Ca    8.8      17 Jul 2021 06:27    TPro  5.8<L>  /  Alb  2.7<L>  /  TBili  0.5  /  DBili  x   /  AST  25  /  ALT  6   /  AlkPhos  110  07-17    PT/INR - ( 16 Jul 2021 11:16 )   PT: 16.4 sec;   INR: 1.46 ratio         PTT - ( 17 Jul 2021 06:27 )  PTT:31.1 sec      RADIOLOGY & ADDITIONAL TESTS: Reviewed. INTERVAL HPI/OVERNIGHT EVENTS:    SUBJECTIVE: Patient seen and examined at bedside.     CONSTITUTIONAL: No weakness, fevers or chills  EYES/ENT: No visual changes;  No vertigo or throat pain   NECK: No pain or stiffness  RESPIRATORY: No cough, wheezing, hemoptysis; No shortness of breath  CARDIOVASCULAR: No chest pain or palpitations  GASTROINTESTINAL: No abdominal or epigastric pain. No nausea, vomiting, or hematemesis; No diarrhea or constipation. No melena or hematochezia.  GENITOURINARY: No dysuria, frequency or hematuria  NEUROLOGICAL: No numbness or weakness  SKIN: No itching, rashes    OBJECTIVE:    VITAL SIGNS:  ICU Vital Signs Last 24 Hrs  T(C): 37.2 (18 Jul 2021 12:00), Max: 37.8 (18 Jul 2021 04:00)  T(F): 98.9 (18 Jul 2021 12:00), Max: 100 (18 Jul 2021 04:00)  HR: 71 (18 Jul 2021 12:00) (56 - 83)  BP: 124/48 (18 Jul 2021 12:00) (107/48 - 154/55)  BP(mean): 67 (18 Jul 2021 12:00) (54 - 82)  ABP: --  ABP(mean): --  RR: 15 (18 Jul 2021 12:00) (14 - 27)  SpO2: 100% (18 Jul 2021 12:00) (95% - 100%)    Mode: AC/ CMV (Assist Control/ Continuous Mandatory Ventilation), RR (machine): 14, TV (machine): 400, FiO2: 40, PEEP: 5, ITime: 0.77, MAP: 11, PIP: 25    07-17 @ 07:01  -  07-18 @ 07:00  --------------------------------------------------------  IN: 1372.8 mL / OUT: 1000 mL / NET: 372.8 mL    07-18 @ 07:01  -  07-18 @ 13:36  --------------------------------------------------------  IN: 294.4 mL / OUT: 0 mL / NET: 294.4 mL      CAPILLARY BLOOD GLUCOSE      POCT Blood Glucose.: 259 mg/dL (18 Jul 2021 11:34)      PHYSICAL EXAM:    General: NAD  HEENT: NC/AT; PERRL, clear conjunctiva  Neck: supple  Respiratory: CTA b/l  Cardiovascular: +S1/S2; RRR  Abdomen: soft, NT/ND; +BS x4  Extremities: WWP, 2+ peripheral pulses b/l; no LE edema  Skin: normal color and turgor; no rash  Neurological:    MEDICATIONS:  MEDICATIONS  (STANDING):  aspirin  chewable 81 milliGRAM(s) Oral daily  atorvastatin 40 milliGRAM(s) Oral at bedtime  chlorhexidine 0.12% Liquid 15 milliLiter(s) Oral Mucosa every 12 hours  chlorhexidine 2% Cloths 1 Application(s) Topical daily  dextrose 40% Gel 15 Gram(s) Oral once  dextrose 40% Gel 15 Gram(s) Oral once  dextrose 5%. 1000 milliLiter(s) (50 mL/Hr) IV Continuous <Continuous>  dextrose 5%. 1000 milliLiter(s) (100 mL/Hr) IV Continuous <Continuous>  dextrose 50% Injectable 25 Gram(s) IV Push once  dextrose 50% Injectable 12.5 Gram(s) IV Push once  dextrose 50% Injectable 25 Gram(s) IV Push once  diltiazem    Tablet 30 milliGRAM(s) Oral four times a day  epoetin danilo-epbx (RETACRIT) Injectable 26508 Unit(s) IV Push <User Schedule>  glucagon  Injectable 1 milliGRAM(s) IntraMuscular once  heparin  Infusion. 700 Unit(s)/Hr (7 mL/Hr) IV Continuous <Continuous>  insulin lispro (ADMELOG) corrective regimen sliding scale   SubCutaneous every 6 hours  norepinephrine Infusion 0.05 MICROgram(s)/kG/Min (4.04 mL/Hr) IV Continuous <Continuous>  pantoprazole  Injectable 40 milliGRAM(s) IV Push every 12 hours    MEDICATIONS  (PRN):  acetaminophen   Tablet .. 1000 milliGRAM(s) Oral every 6 hours PRN Temp greater or equal to 38C (100.4F), Mild Pain (1 - 3), Moderate Pain (4 - 6)  calamine/zinc oxide Lotion 1 Application(s) Topical three times a day PRN Itching  heparin   Injectable 7000 Unit(s) IV Push every 6 hours PRN For aPTT less than 40  heparin   Injectable 3500 Unit(s) IV Push every 6 hours PRN For aPTT between 40 - 57      ALLERGIES:  Allergies    lisinopril (Other)  opioid-like analgesics (Other)    Intolerances        LABS:                        7.9    12.24 )-----------( 114      ( 18 Jul 2021 03:44 )             27.2     07-18    135  |  96<L>  |  26<H>  ----------------------------<  195<H>  3.7   |  26  |  4.43<H>    Ca    8.4      18 Jul 2021 03:44  Phos  3.0     07-18  Mg     1.90     07-18    TPro  5.3<L>  /  Alb  2.5<L>  /  TBili  0.4  /  DBili  x   /  AST  21  /  ALT  <5  /  AlkPhos  105  07-18    PTT - ( 18 Jul 2021 10:32 )  PTT:49.2 sec      RADIOLOGY & ADDITIONAL TESTS: Reviewed. INTERVAL HPI/OVERNIGHT EVENTS: No acute events O/N.    SUBJECTIVE: Patient seen and examined at bedside. Patient is intubated, off of sedation    Unable to obtain ROS d/t patient's condition    OBJECTIVE:    VITAL SIGNS:  ICU Vital Signs Last 24 Hrs  T(C): 37.2 (18 Jul 2021 12:00), Max: 37.8 (18 Jul 2021 04:00)  T(F): 98.9 (18 Jul 2021 12:00), Max: 100 (18 Jul 2021 04:00)  HR: 71 (18 Jul 2021 12:00) (56 - 83)  BP: 124/48 (18 Jul 2021 12:00) (107/48 - 154/55)  BP(mean): 67 (18 Jul 2021 12:00) (54 - 82)  ABP: --  ABP(mean): --  RR: 15 (18 Jul 2021 12:00) (14 - 27)  SpO2: 100% (18 Jul 2021 12:00) (95% - 100%)    Mode: AC/ CMV (Assist Control/ Continuous Mandatory Ventilation), RR (machine): 14, TV (machine): 400, FiO2: 40, PEEP: 5, ITime: 0.77, MAP: 11, PIP: 25    07-17 @ 07:01  -  07-18 @ 07:00  --------------------------------------------------------  IN: 1372.8 mL / OUT: 1000 mL / NET: 372.8 mL    07-18 @ 07:01  -  07-18 @ 13:36  --------------------------------------------------------  IN: 294.4 mL / OUT: 0 mL / NET: 294.4 mL      CAPILLARY BLOOD GLUCOSE      POCT Blood Glucose.: 259 mg/dL (18 Jul 2021 11:34)      PHYSICAL EXAM:    General: NAD. Patient is awake, opens eyes, does not follow commands  HEENT: NC/AT; PERRL, clear conjunctiva  Neck: supple  Respiratory: CTA b/l  Cardiovascular: +S1/S2; RRR  Abdomen: soft, NT/ND; +BS x4  Extremities: WWP, 2+ peripheral pulses b/l; no LE edema  Skin: normal color and turgor; no rash  Neurological: Patient is intubated and not sedated    MEDICATIONS:  MEDICATIONS  (STANDING):  aspirin  chewable 81 milliGRAM(s) Oral daily  atorvastatin 40 milliGRAM(s) Oral at bedtime  chlorhexidine 0.12% Liquid 15 milliLiter(s) Oral Mucosa every 12 hours  chlorhexidine 2% Cloths 1 Application(s) Topical daily  dextrose 40% Gel 15 Gram(s) Oral once  dextrose 40% Gel 15 Gram(s) Oral once  dextrose 5%. 1000 milliLiter(s) (50 mL/Hr) IV Continuous <Continuous>  dextrose 5%. 1000 milliLiter(s) (100 mL/Hr) IV Continuous <Continuous>  dextrose 50% Injectable 25 Gram(s) IV Push once  dextrose 50% Injectable 12.5 Gram(s) IV Push once  dextrose 50% Injectable 25 Gram(s) IV Push once  diltiazem    Tablet 30 milliGRAM(s) Oral four times a day  epoetin danilo-epbx (RETACRIT) Injectable 34422 Unit(s) IV Push <User Schedule>  glucagon  Injectable 1 milliGRAM(s) IntraMuscular once  heparin  Infusion. 700 Unit(s)/Hr (7 mL/Hr) IV Continuous <Continuous>  insulin lispro (ADMELOG) corrective regimen sliding scale   SubCutaneous every 6 hours  norepinephrine Infusion 0.05 MICROgram(s)/kG/Min (4.04 mL/Hr) IV Continuous <Continuous>  pantoprazole  Injectable 40 milliGRAM(s) IV Push every 12 hours    MEDICATIONS  (PRN):  acetaminophen   Tablet .. 1000 milliGRAM(s) Oral every 6 hours PRN Temp greater or equal to 38C (100.4F), Mild Pain (1 - 3), Moderate Pain (4 - 6)  calamine/zinc oxide Lotion 1 Application(s) Topical three times a day PRN Itching  heparin   Injectable 7000 Unit(s) IV Push every 6 hours PRN For aPTT less than 40  heparin   Injectable 3500 Unit(s) IV Push every 6 hours PRN For aPTT between 40 - 57      ALLERGIES:  Allergies    lisinopril (Other)  opioid-like analgesics (Other)    Intolerances        LABS:                        7.9    12.24 )-----------( 114      ( 18 Jul 2021 03:44 )             27.2     07-18    135  |  96<L>  |  26<H>  ----------------------------<  195<H>  3.7   |  26  |  4.43<H>    Ca    8.4      18 Jul 2021 03:44  Phos  3.0     07-18  Mg     1.90     07-18    TPro  5.3<L>  /  Alb  2.5<L>  /  TBili  0.4  /  DBili  x   /  AST  21  /  ALT  <5  /  AlkPhos  105  07-18    PTT - ( 18 Jul 2021 10:32 )  PTT:49.2 sec      RADIOLOGY & ADDITIONAL TESTS: Reviewed.

## 2021-07-18 NOTE — PROGRESS NOTE ADULT - SUBJECTIVE AND OBJECTIVE BOX
Patient  seen and examined  intubated    lisinopril (Other)  opioid-like analgesics (Other)    Hospital Medications:   MEDICATIONS  (STANDING):  aspirin  chewable 81 milliGRAM(s) Oral daily  atorvastatin 40 milliGRAM(s) Oral at bedtime  chlorhexidine 0.12% Liquid 15 milliLiter(s) Oral Mucosa every 12 hours  chlorhexidine 2% Cloths 1 Application(s) Topical daily  dextrose 40% Gel 15 Gram(s) Oral once  dextrose 40% Gel 15 Gram(s) Oral once  dextrose 5%. 1000 milliLiter(s) (50 mL/Hr) IV Continuous <Continuous>  dextrose 5%. 1000 milliLiter(s) (100 mL/Hr) IV Continuous <Continuous>  dextrose 50% Injectable 25 Gram(s) IV Push once  dextrose 50% Injectable 12.5 Gram(s) IV Push once  dextrose 50% Injectable 25 Gram(s) IV Push once  diltiazem    Tablet 30 milliGRAM(s) Oral four times a day  epoetin danilo-epbx (RETACRIT) Injectable 86769 Unit(s) IV Push <User Schedule>  glucagon  Injectable 1 milliGRAM(s) IntraMuscular once  heparin  Infusion. 700 Unit(s)/Hr (7 mL/Hr) IV Continuous <Continuous>  insulin lispro (ADMELOG) corrective regimen sliding scale   SubCutaneous every 6 hours  norepinephrine Infusion 0.05 MICROgram(s)/kG/Min (4.04 mL/Hr) IV Continuous <Continuous>  pantoprazole  Injectable 40 milliGRAM(s) IV Push every 12 hours  vancomycin  IVPB 750 milliGRAM(s) IV Intermittent once        VITALS:  T(F): 98.9 (07-18-21 @ 12:00), Max: 100 (07-18-21 @ 04:00)  HR: 67 (07-18-21 @ 14:00)  BP: 110/48 (07-18-21 @ 14:00)  RR: 20 (07-18-21 @ 14:00)  SpO2: 100% (07-18-21 @ 14:00)  Wt(kg): --    07-17 @ 07:01  -  07-18 @ 07:00  --------------------------------------------------------  IN: 1372.8 mL / OUT: 1000 mL / NET: 372.8 mL    07-18 @ 07:01  -  07-18 @ 14:44  --------------------------------------------------------  IN: 294.4 mL / OUT: 0 mL / NET: 294.4 mL      PHYSICAL EXAM:  Constitutional: NAD  HEENT: ETT, OGT   Neck: No JVD  Respiratory: CTAB, no wheezes, rales or rhonchi  Cardiovascular: S1, S2, RRR  Gastrointestinal: BS+, soft, NT/ND  Extremities: No cyanosis or clubbing. +peripheral edema  Neurological: unresponsive  : No CVA tenderness. No wagner.   Skin: No rashes  Vascular Access: LUE AV access +thrill and bruit.     LABS:  07-18    135  |  96<L>  |  26<H>  ----------------------------<  195<H>  3.7   |  26  |  4.43<H>    Ca    8.4      18 Jul 2021 03:44  Phos  3.0     07-18  Mg     1.90     07-18    TPro  5.3<L>  /  Alb  2.5<L>  /  TBili  0.4  /  DBili      /  AST  21  /  ALT  <5  /  AlkPhos  105  07-18    Creatinine Trend: 4.43 <--, 6.22 <--, 4.99 <--, 6.40 <--, 5.18 <--, 6.40 <--, 6.05 <--, 8.49 <--, 8.56 <--                        7.9    12.24 )-----------( 114      ( 18 Jul 2021 03:44 )             27.2     Urine Studies:      RADIOLOGY & ADDITIONAL STUDIES:

## 2021-07-18 NOTE — PROGRESS NOTE ADULT - ASSESSMENT
69 yo M with PMhx of CAD s/p CABG (course at that time complicated by pleural effusion requiring chest tubes), ESRD on HD (TTS), DMII, HTN, afib on coumadin, and anemia presented from [facility] for evaluation of chest pain and GIB. Renal following for ESRD Mx. s/p cardiac arrest. CTH showed acute infarcts.     ESRD  Maintenance schedule TTS  Plan for next HD tuesday  Electrolytes within normal limits.   Improved hemodynamics, pressor titration as per ICU team  dose all meds for ESRD    Anemia in CKD+GIB- Hb <goal  c/w Epo 20k tiw with HD  monitor h/h, neg FOBT  GI following, No plans for cscope.     Coreen Mackey MD  [facility]  Office [phone]  Ans Serv [phone] cell - [phone]

## 2021-07-18 NOTE — PROGRESS NOTE ADULT - SUBJECTIVE AND OBJECTIVE BOX
EP ATTENDING    tele: AFib  prior PEA/Jay/Asystole arrest.  resuscitated and intubated, on pressors.  antibiotics for MRSA bacteremia.  CT head w/ interval hypodensities concerning for stroke.  waking up vent.      Review of Systems:   Constitutional: [ ] fevers, [ ] chills.   Skin: [ ] dry skin. [ ] rashes.  Psychiatric: [ ] depression, [ ] anxiety.   Gastrointestinal: [ ] BRBPR, [ ] melena.   Neurological: [ ] confusion. [ ] seizures. [ ] shuffling gait.   Ears,Nose,Mouth and Throat: [ ] ear pain [ ] sore throat.   Eyes: [ ] diplopia.   Respiratory: [ ] hemoptysis. [ ] shortness of breath  Cardiovascular: See HPI above  Hematologic/Lymphatic: [ ] anemia. [ ] painful nodes. [ ] prolonged bleeding.   Genitourinary: [ ] hematuria. [ ] flank pain.   Endocrine: [ ] significant change in weight. [ ] intolerance to heat and cold.     Review of systems [x ] otherwise negative, [ ] otherwise unable to obtain    FH: no family history of sudden cardiac death in first degree relatives    SH: [ ] tobacco, [ ] alcohol, [ ] drugs    acetaminophen   Tablet .. 1000 milliGRAM(s) Oral every 6 hours PRN  aspirin  chewable 81 milliGRAM(s) Oral daily  atorvastatin 40 milliGRAM(s) Oral at bedtime  calamine/zinc oxide Lotion 1 Application(s) Topical three times a day PRN  chlorhexidine 0.12% Liquid 15 milliLiter(s) Oral Mucosa every 12 hours  chlorhexidine 2% Cloths 1 Application(s) Topical daily  dextrose 40% Gel 15 Gram(s) Oral once  dextrose 40% Gel 15 Gram(s) Oral once  dextrose 5%. 1000 milliLiter(s) IV Continuous <Continuous>  dextrose 5%. 1000 milliLiter(s) IV Continuous <Continuous>  dextrose 50% Injectable 25 Gram(s) IV Push once  dextrose 50% Injectable 12.5 Gram(s) IV Push once  dextrose 50% Injectable 25 Gram(s) IV Push once  diltiazem    Tablet 30 milliGRAM(s) Oral four times a day  epoetin danilo-epbx (RETACRIT) Injectable 87647 Unit(s) IV Push <User Schedule>  glucagon  Injectable 1 milliGRAM(s) IntraMuscular once  heparin   Injectable 7000 Unit(s) IV Push every 6 hours PRN  heparin   Injectable 3500 Unit(s) IV Push every 6 hours PRN  heparin  Infusion. 700 Unit(s)/Hr IV Continuous <Continuous>  insulin lispro (ADMELOG) corrective regimen sliding scale   SubCutaneous every 6 hours  norepinephrine Infusion 0.05 MICROgram(s)/kG/Min IV Continuous <Continuous>  pantoprazole  Injectable 40 milliGRAM(s) IV Push every 12 hours                            7.9    12.24 )-----------( 114      ( 18 Jul 2021 03:44 )             27.2       135  |  96<L>  |  26<H>  ----------------------------<  195<H>  3.7   |  26  |  4.43<H>    Ca    8.4      18 Jul 2021 03:44  Phos  3.0     07-18  Mg     1.90     07-18    TPro  5.3<L>  /  Alb  2.5<L>  /  TBili  0.4  /  DBili  x   /  AST  21  /  ALT  <5  /  AlkPhos  105  07-18      T(C): 37.2 (07-18-21 @ 12:00), Max: 37.8 (07-18-21 @ 04:00)  HR: 71 (07-18-21 @ 12:00) (56 - 83)  BP: 124/48 (07-18-21 @ 12:00) (107/48 - 154/55)  RR: 15 (07-18-21 @ 12:00) (14 - 27)  SpO2: 100% (07-18-21 @ 12:00) (95% - 100%)    General: ill appearing, intubated   Head: Normocephalic and atraumatic.   Neck: No JVD. No bruits. Supple. Does not appear to be enlarged.  Left IJ CVL.  Intubated.    Cardiovascular: + S1,S2 ; IRR Soft systolic murmur at the left lower sternal border. No rubs noted.    Lungs: coarse breath sounds bL.   Abdomen: + BS, soft. Non distended.   Extremities: limbs warm, anasarca.  Psychiatric: unable to assess due to sedation  Musculoskeletal: unable to assess strength and ROM due to sedation.    A/P) 69 y/o male PMH CAD s/p CABG, PAF/AFL on coumadin, HTN, hyperlipidemia, DM, ESRD on HD, hypothyroidism a/w sepsis    -continue diltiazem for AF rate control.  -heparin infusion for secondary stroke prevention  - s/p PEA/jay/asystole arrest, secondary to critical illness and infection.  no pacemaker or ICD indicated.  -no further inpatient EP workup needed    - will follow.

## 2021-07-19 LAB
ALBUMIN SERPL ELPH-MCNC: 2.1 G/DL — LOW (ref 3.3–5)
ALBUMIN SERPL ELPH-MCNC: 2.3 G/DL — LOW (ref 3.3–5)
ALP SERPL-CCNC: 111 U/L — SIGNIFICANT CHANGE UP (ref 40–120)
ALP SERPL-CCNC: 88 U/L — SIGNIFICANT CHANGE UP (ref 40–120)
ALT FLD-CCNC: 6 U/L — SIGNIFICANT CHANGE UP (ref 4–41)
ALT FLD-CCNC: 6 U/L — SIGNIFICANT CHANGE UP (ref 4–41)
ANION GAP SERPL CALC-SCNC: 14 MMOL/L — SIGNIFICANT CHANGE UP (ref 7–14)
ANION GAP SERPL CALC-SCNC: 16 MMOL/L — HIGH (ref 7–14)
APTT BLD: 69.5 SEC — HIGH (ref 27–36.3)
APTT BLD: 90.5 SEC — HIGH (ref 27–36.3)
AST SERPL-CCNC: 21 U/L — SIGNIFICANT CHANGE UP (ref 4–40)
AST SERPL-CCNC: 24 U/L — SIGNIFICANT CHANGE UP (ref 4–40)
BASOPHILS # BLD AUTO: 0.04 K/UL — SIGNIFICANT CHANGE UP (ref 0–0.2)
BASOPHILS # BLD AUTO: 0.05 K/UL — SIGNIFICANT CHANGE UP (ref 0–0.2)
BASOPHILS NFR BLD AUTO: 0.3 % — SIGNIFICANT CHANGE UP (ref 0–2)
BASOPHILS NFR BLD AUTO: 0.4 % — SIGNIFICANT CHANGE UP (ref 0–2)
BILIRUB SERPL-MCNC: 0.4 MG/DL — SIGNIFICANT CHANGE UP (ref 0.2–1.2)
BILIRUB SERPL-MCNC: 0.4 MG/DL — SIGNIFICANT CHANGE UP (ref 0.2–1.2)
BLD GP AB SCN SERPL QL: NEGATIVE — SIGNIFICANT CHANGE UP
BUN SERPL-MCNC: 36 MG/DL — HIGH (ref 7–23)
BUN SERPL-MCNC: 38 MG/DL — HIGH (ref 7–23)
CALCIUM SERPL-MCNC: 8.3 MG/DL — LOW (ref 8.4–10.5)
CALCIUM SERPL-MCNC: 8.4 MG/DL — SIGNIFICANT CHANGE UP (ref 8.4–10.5)
CHLORIDE SERPL-SCNC: 96 MMOL/L — LOW (ref 98–107)
CHLORIDE SERPL-SCNC: 96 MMOL/L — LOW (ref 98–107)
CO2 SERPL-SCNC: 23 MMOL/L — SIGNIFICANT CHANGE UP (ref 22–31)
CO2 SERPL-SCNC: 24 MMOL/L — SIGNIFICANT CHANGE UP (ref 22–31)
CREAT SERPL-MCNC: 5.45 MG/DL — HIGH (ref 0.5–1.3)
CREAT SERPL-MCNC: 5.79 MG/DL — HIGH (ref 0.5–1.3)
EOSINOPHIL # BLD AUTO: 0.07 K/UL — SIGNIFICANT CHANGE UP (ref 0–0.5)
EOSINOPHIL # BLD AUTO: 0.09 K/UL — SIGNIFICANT CHANGE UP (ref 0–0.5)
EOSINOPHIL NFR BLD AUTO: 0.5 % — SIGNIFICANT CHANGE UP (ref 0–6)
EOSINOPHIL NFR BLD AUTO: 0.7 % — SIGNIFICANT CHANGE UP (ref 0–6)
GLUCOSE BLDC GLUCOMTR-MCNC: 147 MG/DL — HIGH (ref 70–99)
GLUCOSE BLDC GLUCOMTR-MCNC: 167 MG/DL — HIGH (ref 70–99)
GLUCOSE BLDC GLUCOMTR-MCNC: 169 MG/DL — HIGH (ref 70–99)
GLUCOSE BLDC GLUCOMTR-MCNC: 169 MG/DL — HIGH (ref 70–99)
GLUCOSE BLDC GLUCOMTR-MCNC: 200 MG/DL — HIGH (ref 70–99)
GLUCOSE SERPL-MCNC: 161 MG/DL — HIGH (ref 70–99)
GLUCOSE SERPL-MCNC: 177 MG/DL — HIGH (ref 70–99)
HCT VFR BLD CALC: 23.4 % — LOW (ref 39–50)
HCT VFR BLD CALC: 25 % — LOW (ref 39–50)
HCT VFR BLD CALC: 27.1 % — LOW (ref 39–50)
HGB BLD-MCNC: 6.8 G/DL — CRITICAL LOW (ref 13–17)
HGB BLD-MCNC: 7.2 G/DL — LOW (ref 13–17)
HGB BLD-MCNC: 7.8 G/DL — LOW (ref 13–17)
IANC: 10.15 K/UL — HIGH (ref 1.5–8.5)
IANC: 11.2 K/UL — HIGH (ref 1.5–8.5)
IMM GRANULOCYTES NFR BLD AUTO: 0.8 % — SIGNIFICANT CHANGE UP (ref 0–1.5)
IMM GRANULOCYTES NFR BLD AUTO: 1 % — SIGNIFICANT CHANGE UP (ref 0–1.5)
INR BLD: 1.48 RATIO — HIGH (ref 0.88–1.16)
LYMPHOCYTES # BLD AUTO: 0.51 K/UL — LOW (ref 1–3.3)
LYMPHOCYTES # BLD AUTO: 0.7 K/UL — LOW (ref 1–3.3)
LYMPHOCYTES # BLD AUTO: 3.9 % — LOW (ref 13–44)
LYMPHOCYTES # BLD AUTO: 5.8 % — LOW (ref 13–44)
MAGNESIUM SERPL-MCNC: 2 MG/DL — SIGNIFICANT CHANGE UP (ref 1.6–2.6)
MAGNESIUM SERPL-MCNC: 2 MG/DL — SIGNIFICANT CHANGE UP (ref 1.6–2.6)
MCHC RBC-ENTMCNC: 28.2 PG — SIGNIFICANT CHANGE UP (ref 27–34)
MCHC RBC-ENTMCNC: 28.3 PG — SIGNIFICANT CHANGE UP (ref 27–34)
MCHC RBC-ENTMCNC: 28.3 PG — SIGNIFICANT CHANGE UP (ref 27–34)
MCHC RBC-ENTMCNC: 28.8 GM/DL — LOW (ref 32–36)
MCHC RBC-ENTMCNC: 28.8 GM/DL — LOW (ref 32–36)
MCHC RBC-ENTMCNC: 29.1 GM/DL — LOW (ref 32–36)
MCV RBC AUTO: 97.5 FL — SIGNIFICANT CHANGE UP (ref 80–100)
MCV RBC AUTO: 97.8 FL — SIGNIFICANT CHANGE UP (ref 80–100)
MCV RBC AUTO: 98.4 FL — SIGNIFICANT CHANGE UP (ref 80–100)
MONOCYTES # BLD AUTO: 1.04 K/UL — HIGH (ref 0–0.9)
MONOCYTES # BLD AUTO: 1.17 K/UL — HIGH (ref 0–0.9)
MONOCYTES NFR BLD AUTO: 8.6 % — SIGNIFICANT CHANGE UP (ref 2–14)
MONOCYTES NFR BLD AUTO: 8.9 % — SIGNIFICANT CHANGE UP (ref 2–14)
NEUTROPHILS # BLD AUTO: 10.15 K/UL — HIGH (ref 1.8–7.4)
NEUTROPHILS # BLD AUTO: 11.2 K/UL — HIGH (ref 1.8–7.4)
NEUTROPHILS NFR BLD AUTO: 83.5 % — HIGH (ref 43–77)
NEUTROPHILS NFR BLD AUTO: 85.6 % — HIGH (ref 43–77)
NRBC # BLD: 0 /100 WBCS — SIGNIFICANT CHANGE UP
NRBC # FLD: 0 K/UL — SIGNIFICANT CHANGE UP
PHOSPHATE SERPL-MCNC: 3.3 MG/DL — SIGNIFICANT CHANGE UP (ref 2.5–4.5)
PHOSPHATE SERPL-MCNC: 3.5 MG/DL — SIGNIFICANT CHANGE UP (ref 2.5–4.5)
PLATELET # BLD AUTO: 144 K/UL — LOW (ref 150–400)
PLATELET # BLD AUTO: 150 K/UL — SIGNIFICANT CHANGE UP (ref 150–400)
PLATELET # BLD AUTO: 219 K/UL — SIGNIFICANT CHANGE UP (ref 150–400)
POTASSIUM SERPL-MCNC: 3.7 MMOL/L — SIGNIFICANT CHANGE UP (ref 3.5–5.3)
POTASSIUM SERPL-MCNC: 4 MMOL/L — SIGNIFICANT CHANGE UP (ref 3.5–5.3)
POTASSIUM SERPL-SCNC: 3.7 MMOL/L — SIGNIFICANT CHANGE UP (ref 3.5–5.3)
POTASSIUM SERPL-SCNC: 4 MMOL/L — SIGNIFICANT CHANGE UP (ref 3.5–5.3)
PROT SERPL-MCNC: 4.9 G/DL — LOW (ref 6–8.3)
PROT SERPL-MCNC: 5.3 G/DL — LOW (ref 6–8.3)
PROTHROM AB SERPL-ACNC: 16.6 SEC — HIGH (ref 10.6–13.6)
RBC # BLD: 2.4 M/UL — LOW (ref 4.2–5.8)
RBC # BLD: 2.54 M/UL — LOW (ref 4.2–5.8)
RBC # BLD: 2.77 M/UL — LOW (ref 4.2–5.8)
RBC # FLD: 18.9 % — HIGH (ref 10.3–14.5)
RBC # FLD: 18.9 % — HIGH (ref 10.3–14.5)
RBC # FLD: 19 % — HIGH (ref 10.3–14.5)
RETICS #: 155.9 K/UL — HIGH (ref 25–125)
RETICS/RBC NFR: 6.2 % — HIGH (ref 0.5–2.5)
RH IG SCN BLD-IMP: POSITIVE — SIGNIFICANT CHANGE UP
SODIUM SERPL-SCNC: 134 MMOL/L — LOW (ref 135–145)
SODIUM SERPL-SCNC: 135 MMOL/L — SIGNIFICANT CHANGE UP (ref 135–145)
VANCOMYCIN FLD-MCNC: 23 UG/ML — SIGNIFICANT CHANGE UP
WBC # BLD: 12.15 K/UL — HIGH (ref 3.8–10.5)
WBC # BLD: 13.1 K/UL — HIGH (ref 3.8–10.5)
WBC # BLD: 14.29 K/UL — HIGH (ref 3.8–10.5)
WBC # FLD AUTO: 12.15 K/UL — HIGH (ref 3.8–10.5)
WBC # FLD AUTO: 13.1 K/UL — HIGH (ref 3.8–10.5)
WBC # FLD AUTO: 14.29 K/UL — HIGH (ref 3.8–10.5)

## 2021-07-19 PROCEDURE — 71045 X-RAY EXAM CHEST 1 VIEW: CPT | Mod: 26

## 2021-07-19 PROCEDURE — 93312 ECHO TRANSESOPHAGEAL: CPT | Mod: 26

## 2021-07-19 PROCEDURE — 99291 CRITICAL CARE FIRST HOUR: CPT

## 2021-07-19 PROCEDURE — 76376 3D RENDER W/INTRP POSTPROCES: CPT | Mod: 26

## 2021-07-19 PROCEDURE — 99233 SBSQ HOSP IP/OBS HIGH 50: CPT

## 2021-07-19 PROCEDURE — 93320 DOPPLER ECHO COMPLETE: CPT | Mod: 26,GC

## 2021-07-19 PROCEDURE — 93325 DOPPLER ECHO COLOR FLOW MAPG: CPT | Mod: 26,GC

## 2021-07-19 RX ORDER — HEPARIN SODIUM 5000 [USP'U]/ML
7000 INJECTION INTRAVENOUS; SUBCUTANEOUS EVERY 6 HOURS
Refills: 0 | Status: DISCONTINUED | OUTPATIENT
Start: 2021-07-19 | End: 2021-07-20

## 2021-07-19 RX ORDER — MIDAZOLAM HYDROCHLORIDE 1 MG/ML
2 INJECTION, SOLUTION INTRAMUSCULAR; INTRAVENOUS ONCE
Refills: 0 | Status: DISCONTINUED | OUTPATIENT
Start: 2021-07-19 | End: 2021-07-19

## 2021-07-19 RX ORDER — HEPARIN SODIUM 5000 [USP'U]/ML
2500 INJECTION INTRAVENOUS; SUBCUTANEOUS
Qty: 25000 | Refills: 0 | Status: DISCONTINUED | OUTPATIENT
Start: 2021-07-19 | End: 2021-07-20

## 2021-07-19 RX ORDER — FENTANYL CITRATE 50 UG/ML
50 INJECTION INTRAVENOUS ONCE
Refills: 0 | Status: DISCONTINUED | OUTPATIENT
Start: 2021-07-19 | End: 2021-07-19

## 2021-07-19 RX ORDER — HEPARIN SODIUM 5000 [USP'U]/ML
3500 INJECTION INTRAVENOUS; SUBCUTANEOUS EVERY 6 HOURS
Refills: 0 | Status: DISCONTINUED | OUTPATIENT
Start: 2021-07-19 | End: 2021-07-20

## 2021-07-19 RX ORDER — MIDAZOLAM HYDROCHLORIDE 1 MG/ML
4 INJECTION, SOLUTION INTRAMUSCULAR; INTRAVENOUS ONCE
Refills: 0 | Status: DISCONTINUED | OUTPATIENT
Start: 2021-07-19 | End: 2021-07-19

## 2021-07-19 RX ADMIN — Medication 4.04 MICROGRAM(S)/KG/MIN: at 08:15

## 2021-07-19 RX ADMIN — PANTOPRAZOLE SODIUM 40 MILLIGRAM(S): 20 TABLET, DELAYED RELEASE ORAL at 17:40

## 2021-07-19 RX ADMIN — HEPARIN SODIUM 2300 UNIT(S)/HR: 5000 INJECTION INTRAVENOUS; SUBCUTANEOUS at 00:26

## 2021-07-19 RX ADMIN — Medication 1: at 17:39

## 2021-07-19 RX ADMIN — HEPARIN SODIUM 2300 UNIT(S)/HR: 5000 INJECTION INTRAVENOUS; SUBCUTANEOUS at 05:54

## 2021-07-19 RX ADMIN — Medication 30 MILLIGRAM(S): at 12:49

## 2021-07-19 RX ADMIN — FENTANYL CITRATE 50 MICROGRAM(S): 50 INJECTION INTRAVENOUS at 16:47

## 2021-07-19 RX ADMIN — CHLORHEXIDINE GLUCONATE 15 MILLILITER(S): 213 SOLUTION TOPICAL at 17:53

## 2021-07-19 RX ADMIN — ATORVASTATIN CALCIUM 40 MILLIGRAM(S): 80 TABLET, FILM COATED ORAL at 23:06

## 2021-07-19 RX ADMIN — CHLORHEXIDINE GLUCONATE 1 APPLICATION(S): 213 SOLUTION TOPICAL at 13:35

## 2021-07-19 RX ADMIN — PANTOPRAZOLE SODIUM 40 MILLIGRAM(S): 20 TABLET, DELAYED RELEASE ORAL at 05:28

## 2021-07-19 RX ADMIN — Medication 30 MILLIGRAM(S): at 05:28

## 2021-07-19 RX ADMIN — HEPARIN SODIUM 2500 UNIT(S)/HR: 5000 INJECTION INTRAVENOUS; SUBCUTANEOUS at 21:00

## 2021-07-19 RX ADMIN — MIDAZOLAM HYDROCHLORIDE 4 MILLIGRAM(S): 1 INJECTION, SOLUTION INTRAMUSCULAR; INTRAVENOUS at 16:25

## 2021-07-19 RX ADMIN — Medication 1: at 12:45

## 2021-07-19 RX ADMIN — Medication 1: at 05:58

## 2021-07-19 RX ADMIN — Medication 30 MILLIGRAM(S): at 23:06

## 2021-07-19 RX ADMIN — CHLORHEXIDINE GLUCONATE 15 MILLILITER(S): 213 SOLUTION TOPICAL at 05:28

## 2021-07-19 RX ADMIN — FENTANYL CITRATE 50 MICROGRAM(S): 50 INJECTION INTRAVENOUS at 16:32

## 2021-07-19 NOTE — PROGRESS NOTE ADULT - ATTENDING COMMENTS
70 year old man with s/p CABG, ESRD on HD, known L pleural effusion, a-fib presented with chest pain and concern for GIB, course complicated by septic shock due to MRSA bacteremia and cardiac arrest     - patient is awake able to follow commands  - awaiting TRUMAN to rule out Endocarditis  - SBT after TRUMAN  - bacteremia seems to have resolved  - plan for HD 7/20

## 2021-07-19 NOTE — PROGRESS NOTE ADULT - SUBJECTIVE AND OBJECTIVE BOX
CARDIOLOGY ATTENDING    tele - SR    S: remains intubated but is moving extremities today; ros limited     Review of Systems:   Constitutional: [ ] fevers, [ ] chills.   Skin: [ ] dry skin. [ ] rashes.  Psychiatric: [ ] depression, [ ] anxiety.   Gastrointestinal: [ ] BRBPR, [ ] melena.   Neurological: [ ] confusion. [ ] seizures. [ ] shuffling gait.   Ears,Nose,Mouth and Throat: [ ] ear pain [ ] sore throat.   Eyes: [ ] diplopia.   Respiratory: [ ] hemoptysis. [ ] shortness of breath  Cardiovascular: See HPI above  Hematologic/Lymphatic: [ ] anemia. [ ] painful nodes. [ ] prolonged bleeding.   Genitourinary: [ ] hematuria. [ ] flank pain.   Endocrine: [ ] significant change in weight. [ ] intolerance to heat and cold.     Review of systems [ ] otherwise negative, [x ] otherwise unable to obtain    FH: no family history of sudden cardiac death in first degree relatives    SH: [ ] tobacco, [ ] alcohol, [ ] drugs      acetaminophen   Tablet .. 1000 milliGRAM(s) Oral every 6 hours PRN  atorvastatin 40 milliGRAM(s) Oral at bedtime  calamine/zinc oxide Lotion 1 Application(s) Topical three times a day PRN  chlorhexidine 0.12% Liquid 15 milliLiter(s) Oral Mucosa every 12 hours  chlorhexidine 2% Cloths 1 Application(s) Topical daily  dextrose 40% Gel 15 Gram(s) Oral once  dextrose 40% Gel 15 Gram(s) Oral once  dextrose 5%. 1000 milliLiter(s) IV Continuous <Continuous>  dextrose 5%. 1000 milliLiter(s) IV Continuous <Continuous>  dextrose 50% Injectable 25 Gram(s) IV Push once  dextrose 50% Injectable 12.5 Gram(s) IV Push once  dextrose 50% Injectable 25 Gram(s) IV Push once  diltiazem    Tablet 30 milliGRAM(s) Oral four times a day  epoetin danilo-epbx (RETACRIT) Injectable 59247 Unit(s) IV Push <User Schedule>  glucagon  Injectable 1 milliGRAM(s) IntraMuscular once  heparin   Injectable 7000 Unit(s) IV Push every 6 hours PRN  heparin   Injectable 3500 Unit(s) IV Push every 6 hours PRN  insulin lispro (ADMELOG) corrective regimen sliding scale   SubCutaneous every 6 hours  norepinephrine Infusion 0.05 MICROgram(s)/kG/Min IV Continuous <Continuous>  pantoprazole  Injectable 40 milliGRAM(s) IV Push every 12 hours                            7.2    12.15 )-----------( 150      ( 19 Jul 2021 05:30 )             25.0       07-19    135  |  96<L>  |  38<H>  ----------------------------<  161<H>  4.0   |  23  |  5.79<H>    Ca    8.3<L>      19 Jul 2021 05:30  Phos  3.5     07-19  Mg     2.00     07-19    TPro  4.9<L>  /  Alb  2.1<L>  /  TBili  0.4  /  DBili  x   /  AST  21  /  ALT  6   /  AlkPhos  88  07-19            T(C): 37.3 (07-19-21 @ 08:00), Max: 37.3 (07-19-21 @ 08:00)  HR: 81 (07-19-21 @ 10:19) (62 - 81)  BP: 111/42 (07-19-21 @ 10:00) (94/40 - 133/40)  RR: 14 (07-19-21 @ 10:00) (14 - 20)  SpO2: 100% (07-19-21 @ 10:19) (97% - 100%)  Wt(kg): --    I&O's Summary    18 Jul 2021 07:01  -  19 Jul 2021 07:00  --------------------------------------------------------  IN: 1162.6 mL / OUT: 0 mL / NET: 1162.6 mL    19 Jul 2021 07:01  -  19 Jul 2021 12:29  --------------------------------------------------------  IN: 1.6 mL / OUT: 0 mL / NET: 1.6 mL          General: intubated; sedated   Head: Normocephalic and atraumatic.   Neck: No JVD. No bruits. Supple. Does not appear to be enlarged.   Cardiovascular: + S1,S2 ; RRR Soft systolic murmur at the left lower sternal border. No rubs noted.    Lungs: decreased BS BL  Abdomen: + BS, soft. Non tender. Non distended. No rebound. No guarding.   Extremities: No clubbing/cyanosis/edema.   Neurologic: unable to assess  Skin: Warm and moist. The patient's skin has normal elasticity and good skin turgor.   Psychiatric: unable to assess   Musculoskeletal: unable to assess    DATA  < from: Transthoracic Echocardiogram (07.07.21 @ 18:17) >  ------------------------------------------------------------------------  CONCLUSIONS:  1. Mitral annular calcification, otherwise normal mitral  valve. Minimal mitral regurgitation.  2. Endocardium not well visualized; grossly normal left  ventricular systolic function.  3. Right ventricular enlargement with decreased right  ventricular systolic function.  4. Inadequate tricuspid regurgitation Doppler envelope  precludes estimation of RVSP.  ------------------------------------------------------------------------  Confirmed on  7/7/2021 - 21:16:20 by Osvaldo Bertrand M.D.,  [facility], [facility]    < end of copied text >    Tele: SR, 3 beats NSVT     A/P) 69 y/o male PMH CAD s/p CABG and old PCI, AFL x 1 year on coumadin, HTN, hyperlipidemia, DM, ESRD on HD, and hypothyroidism originally admitted to [facility] with GIB, transferred to [facility], found to have septic shock and MRSA bacteremia.     -Events noted; pt. s/p PEA arrest   -Repeat TTE with only mild segmental LV dysfunction in the inferoseptum  -Given negative CPK, do not suspect acs  -Suspect elevated troponin in the setting of demand ischemia from underlying septic shock  -Pressor support per MICU  -Monitor MS - follow up neuro   -EP follow up appreciated   -Given MRSA bacteremia, check TRUMAN to rule out endocarditis if family agreeable - appreciate GI eval - cleared by GI for TRUMAN    -Abx per ID  -Monitor BCx   -CTH head noted - pt. with cva - ac was restarted but now held due to dropping h/h   -Plan for TRUMAN today   -Follow up neuro   -Supportive care per MICU     Kalie Lau MD

## 2021-07-19 NOTE — PROGRESS NOTE ADULT - SUBJECTIVE AND OBJECTIVE BOX
Date of Service: 07-19-21 @ 14:45    Patient is a 70y old  Male who presents with a chief complaint of Chest pain and GIB (19 Jul 2021 14:04)      Any change in ROS: intuabted still     MEDICATIONS  (STANDING):  atorvastatin 40 milliGRAM(s) Oral at bedtime  chlorhexidine 0.12% Liquid 15 milliLiter(s) Oral Mucosa every 12 hours  chlorhexidine 2% Cloths 1 Application(s) Topical daily  dextrose 40% Gel 15 Gram(s) Oral once  dextrose 40% Gel 15 Gram(s) Oral once  dextrose 5%. 1000 milliLiter(s) (50 mL/Hr) IV Continuous <Continuous>  dextrose 5%. 1000 milliLiter(s) (100 mL/Hr) IV Continuous <Continuous>  dextrose 50% Injectable 25 Gram(s) IV Push once  dextrose 50% Injectable 12.5 Gram(s) IV Push once  dextrose 50% Injectable 25 Gram(s) IV Push once  diltiazem    Tablet 30 milliGRAM(s) Oral four times a day  epoetin danilo-epbx (RETACRIT) Injectable 71741 Unit(s) IV Push <User Schedule>  glucagon  Injectable 1 milliGRAM(s) IntraMuscular once  insulin lispro (ADMELOG) corrective regimen sliding scale   SubCutaneous every 6 hours  norepinephrine Infusion 0.05 MICROgram(s)/kG/Min (4.04 mL/Hr) IV Continuous <Continuous>  pantoprazole  Injectable 40 milliGRAM(s) IV Push every 12 hours    MEDICATIONS  (PRN):  acetaminophen   Tablet .. 1000 milliGRAM(s) Oral every 6 hours PRN Temp greater or equal to 38C (100.4F), Mild Pain (1 - 3), Moderate Pain (4 - 6)  calamine/zinc oxide Lotion 1 Application(s) Topical three times a day PRN Itching  heparin   Injectable 7000 Unit(s) IV Push every 6 hours PRN For aPTT less than 40  heparin   Injectable 3500 Unit(s) IV Push every 6 hours PRN For aPTT between 40 - 57    Vital Signs Last 24 Hrs  T(C): 37.3 (19 Jul 2021 12:00), Max: 37.3 (19 Jul 2021 08:00)  T(F): 99.2 (19 Jul 2021 12:00), Max: 99.2 (19 Jul 2021 12:00)  HR: 65 (19 Jul 2021 14:15) (62 - 81)  BP: 117/46 (19 Jul 2021 14:00) (94/40 - 133/40)  BP(mean): 64 (19 Jul 2021 14:00) (48 - 88)  RR: 18 (19 Jul 2021 14:00) (14 - 20)  SpO2: 100% (19 Jul 2021 14:15) (97% - 100%)  Mode: AC/ CMV (Assist Control/ Continuous Mandatory Ventilation)  RR (machine): 14  TV (machine): 400  FiO2: 40  PEEP: 5  ITime: 0.64  MAP: 10  PIP: 24    I&O's Summary    18 Jul 2021 07:01  -  19 Jul 2021 07:00  --------------------------------------------------------  IN: 1162.6 mL / OUT: 0 mL / NET: 1162.6 mL    19 Jul 2021 07:01  -  19 Jul 2021 14:45  --------------------------------------------------------  IN: 11.2 mL / OUT: 0 mL / NET: 11.2 mL          Physical Exam:   GENERAL: NAD, well-groomed, well-developed  HEENT: KATHY/   Atraumatic, Normocephalic  ENMT: No tonsillar erythema, exudates, or enlargement; Moist mucous membranes, Good dentition, No lesions  NECK: Supple, No JVD, Normal thyroid  CHEST/LUNG: Clear to auscultaion, ; No rales, rhonchi, wheezing, or rubs  CVS: Regular rate and rhythm; No murmurs, rubs, or gallops  GI: : Soft, Nontender, Nondistended; Bowel sounds present  NERVOUS SYSTEM:  Intuabed and sedated  EXTREMITIES:  2+ Peripheral Pulses, No clubbing, cyanosis, or edema  LYMPH: No lymphadenopathy noted  SKIN: No rashes or lesions  ENDOCRINOLOGY: No Thyromegaly  PSYCH: Appropriate    Labs:                              7.2    12.15 )-----------( 150      ( 19 Jul 2021 05:30 )             25.0                         7.8    13.10 )-----------( 144      ( 18 Jul 2021 23:49 )             27.1                         7.9    12.24 )-----------( 114      ( 18 Jul 2021 03:44 )             27.2                         9.0    13.73 )-----------( 163      ( 17 Jul 2021 06:27 )             31.1                         7.9    8.80  )-----------( 101      ( 16 Jul 2021 05:51 )             26.5     07-19    135  |  96<L>  |  38<H>  ----------------------------<  161<H>  4.0   |  23  |  5.79<H>  07-18    134<L>  |  96<L>  |  36<H>  ----------------------------<  177<H>  3.7   |  24  |  5.45<H>  07-18    135  |  96<L>  |  26<H>  ----------------------------<  195<H>  3.7   |  26  |  4.43<H>  07-17    133<L>  |  94<L>  |  38<H>  ----------------------------<  213<H>  4.3   |  22  |  6.22<H>  07-16    133<L>  |  96<L>  |  23  ----------------------------<  128<H>  3.9   |  24  |  4.99<H>    Ca    8.3<L>      19 Jul 2021 05:30  Ca    8.4      18 Jul 2021 23:49  Ca    8.4      18 Jul 2021 03:44  Phos  3.5     07-19  Phos  3.3     07-18  Phos  3.0     07-18  Mg     2.00     07-19  Mg     2.00     07-18  Mg     1.90     07-18    TPro  4.9<L>  /  Alb  2.1<L>  /  TBili  0.4  /  DBili  x   /  AST  21  /  ALT  6   /  AlkPhos  88  07-19  TPro  5.3<L>  /  Alb  2.3<L>  /  TBili  0.4  /  DBili  x   /  AST  24  /  ALT  6   /  AlkPhos  111  07-18  TPro  5.3<L>  /  Alb  2.5<L>  /  TBili  0.4  /  DBili  x   /  AST  21  /  ALT  <5  /  AlkPhos  105  07-18  TPro  5.8<L>  /  Alb  2.7<L>  /  TBili  0.5  /  DBili  x   /  AST  25  /  ALT  6   /  AlkPhos  110  07-17  TPro  5.0<L>  /  Alb  2.3<L>  /  TBili  0.4  /  DBili  x   /  AST  23  /  ALT  <5<L>  /  AlkPhos  89  07-16    CAPILLARY BLOOD GLUCOSE      POCT Blood Glucose.: 169 mg/dL (19 Jul 2021 12:45)  POCT Blood Glucose.: 167 mg/dL (19 Jul 2021 05:49)  POCT Blood Glucose.: 200 mg/dL (18 Jul 2021 23:02)  POCT Blood Glucose.: 254 mg/dL (18 Jul 2021 17:29)      LIVER FUNCTIONS - ( 19 Jul 2021 05:30 )  Alb: 2.1 g/dL / Pro: 4.9 g/dL / ALK PHOS: 88 U/L / ALT: 6 U/L / AST: 21 U/L / GGT: x           PT/INR - ( 19 Jul 2021 05:30 )   PT: 16.6 sec;   INR: 1.48 ratio         PTT - ( 19 Jul 2021 05:30 )  PTT:69.5 sec          RECENT CULTURES:        RESPIRATORY CULTURES:    r< from: CT Abdomen and Pelvis w/ IV Cont (07.13.21 @ 18:37) >  BOWEL: No bowel obstruction. Appendix is normal. Enteric tube is present with the tip in stomach.  PERITONEUM: Mild to moderate ascites in the perihepatic and perisplenic regions, tracking along the paracolic gutters. Trace ascites in the pelvis.  VESSELS: Atherosclerotic changes.  RETROPERITONEUM/LYMPH NODES: No lymphadenopathy.  ABDOMINAL WALL: Diffuse subcutaneous edema.  BONES: Degenerative changes.    IMPRESSION:  *  Mild pulmonary edema with bilateral partially loculated pleural effusions with adjacent passive atelectasis.  *  Debris within the right mainstem with associated partial atelectasis of the right lower lobe.  *  Mild to moderate ascites.    --- End of Report ---    < end of copied text >        Studies  Chest X-RAY  CT SCAN Chest   Venous Dopplers: LE:   CT Abdomen  Others

## 2021-07-19 NOTE — PROGRESS NOTE ADULT - SUBJECTIVE AND OBJECTIVE BOX
Follow Up:      Inverval History/ROS:Patient is a 70y old  Male who presents with a chief complaint of Chest pain and GIB (19 Jul 2021 09:18)    No fever  Intubated    Allergies    lisinopril (Other)  opioid-like analgesics (Other)    Intolerances        ANTIMICROBIALS:      OTHER MEDS:  acetaminophen   Tablet .. 1000 milliGRAM(s) Oral every 6 hours PRN  atorvastatin 40 milliGRAM(s) Oral at bedtime  calamine/zinc oxide Lotion 1 Application(s) Topical three times a day PRN  chlorhexidine 0.12% Liquid 15 milliLiter(s) Oral Mucosa every 12 hours  chlorhexidine 2% Cloths 1 Application(s) Topical daily  dextrose 40% Gel 15 Gram(s) Oral once  dextrose 40% Gel 15 Gram(s) Oral once  dextrose 5%. 1000 milliLiter(s) IV Continuous <Continuous>  dextrose 5%. 1000 milliLiter(s) IV Continuous <Continuous>  dextrose 50% Injectable 25 Gram(s) IV Push once  dextrose 50% Injectable 12.5 Gram(s) IV Push once  dextrose 50% Injectable 25 Gram(s) IV Push once  diltiazem    Tablet 30 milliGRAM(s) Oral four times a day  epoetin danilo-epbx (RETACRIT) Injectable 15754 Unit(s) IV Push <User Schedule>  glucagon  Injectable 1 milliGRAM(s) IntraMuscular once  heparin   Injectable 7000 Unit(s) IV Push every 6 hours PRN  heparin   Injectable 3500 Unit(s) IV Push every 6 hours PRN  insulin lispro (ADMELOG) corrective regimen sliding scale   SubCutaneous every 6 hours  norepinephrine Infusion 0.05 MICROgram(s)/kG/Min IV Continuous <Continuous>  pantoprazole  Injectable 40 milliGRAM(s) IV Push every 12 hours      Vital Signs Last 24 Hrs  T(C): 37.3 (19 Jul 2021 08:00), Max: 37.3 (19 Jul 2021 08:00)  T(F): 99.1 (19 Jul 2021 08:00), Max: 99.1 (19 Jul 2021 08:00)  HR: 81 (19 Jul 2021 10:19) (62 - 81)  BP: 111/42 (19 Jul 2021 10:00) (94/40 - 133/40)  BP(mean): 59 (19 Jul 2021 10:00) (48 - 88)  RR: 14 (19 Jul 2021 10:00) (14 - 20)  SpO2: 100% (19 Jul 2021 10:19) (97% - 100%)    PHYSICAL EXAM:  General: [x ] intbuated  HEAD/EYES: [ ] PERRL [ x] white sclera [ ] icterus  ENT:  [ ] normal [ ] supple [ ] thrush [ ] pharyngeal exudate  Cardiovascular:   [ ] murmur [ ] normal [ ] PPM/AICD  Respiratory:  [x ] clear to ausculation bilaterally  GI:  [x ] soft, non-tender, normal bowel sounds  :  [ ] wagner [x ] no CVA tenderness   Musculoskeletal:  [ ] no synovitis  Neurologic:  [ ] non-focal exam   Skin:  [x ] no rash  Lymph: [ x] no lymphadenopathy  Psychiatric:  [ ] appropriate affect [ ] alert & oriented  Lines:  [x ] no phlebitis [ ] central line                                7.2    12.15 )-----------( 150      ( 19 Jul 2021 05:30 )             25.0       07-19    135  |  96<L>  |  38<H>  ----------------------------<  161<H>  4.0   |  23  |  5.79<H>    Ca    8.3<L>      19 Jul 2021 05:30  Phos  3.5     07-19  Mg     2.00     07-19    TPro  4.9<L>  /  Alb  2.1<L>  /  TBili  0.4  /  DBili  x   /  AST  21  /  ALT  6   /  AlkPhos  88  07-19          MICROBIOLOGY:    RADIOLOGY:

## 2021-07-19 NOTE — PROGRESS NOTE ADULT - SUBJECTIVE AND OBJECTIVE BOX
[facility] - S Eugene / Marlena S /D Cruz/ S Narendra/ S Colleen/ Alexandre Esposito / WILMER Hermosillou/ O Jessi  service [phone], office [phone]  ---------------------------------------------------------------------------------------------------------------    Patient seen and examined bedside    Subjective and Objective: No overnight events, sob resolved. No complaints today. feeling better    Allergies: lisinopril (Other)  opioid-like analgesics (Other)      Hospital Medications:   MEDICATIONS  (STANDING):  atorvastatin 40 milliGRAM(s) Oral at bedtime  chlorhexidine 0.12% Liquid 15 milliLiter(s) Oral Mucosa every 12 hours  chlorhexidine 2% Cloths 1 Application(s) Topical daily  dextrose 40% Gel 15 Gram(s) Oral once  dextrose 40% Gel 15 Gram(s) Oral once  dextrose 5%. 1000 milliLiter(s) (50 mL/Hr) IV Continuous <Continuous>  dextrose 5%. 1000 milliLiter(s) (100 mL/Hr) IV Continuous <Continuous>  dextrose 50% Injectable 25 Gram(s) IV Push once  dextrose 50% Injectable 12.5 Gram(s) IV Push once  dextrose 50% Injectable 25 Gram(s) IV Push once  diltiazem    Tablet 30 milliGRAM(s) Oral four times a day  epoetin danilo-epbx (RETACRIT) Injectable 31399 Unit(s) IV Push <User Schedule>  glucagon  Injectable 1 milliGRAM(s) IntraMuscular once  insulin lispro (ADMELOG) corrective regimen sliding scale   SubCutaneous every 6 hours  norepinephrine Infusion 0.05 MICROgram(s)/kG/Min (4.04 mL/Hr) IV Continuous <Continuous>  pantoprazole  Injectable 40 milliGRAM(s) IV Push every 12 hours    VITALS:  T(F): 99.2 (07-19-21 @ 12:00), Max: 99.2 (07-19-21 @ 12:00)  HR: 65 (07-19-21 @ 14:15)  BP: 117/46 (07-19-21 @ 14:00)  RR: 18 (07-19-21 @ 14:00)  SpO2: 100% (07-19-21 @ 14:15)  Wt(kg): --    07-18 @ 07:01  -  07-19 @ 07:00  --------------------------------------------------------  IN: 1162.6 mL / OUT: 0 mL / NET: 1162.6 mL    07-19 @ 07:01  -  07-19 @ 16:05  --------------------------------------------------------  IN: 11.2 mL / OUT: 0 mL / NET: 11.2 mL      PHYSICAL EXAM:  Constitutional: NAD, ETT+ on MV  HEENT: anicteric sclera  Neck: No JVD  Respiratory: CTAB, no wheezes, rales or rhonchi  Cardiovascular: S1, S2, RRR  Gastrointestinal: BS+, soft, NT/ND  Extremities: No peripheral edema  Neurological: awake, off sedation   : No wagner.   Vascular Access: avf+    LABS:  07-19    135  |  96<L>  |  38<H>  ----------------------------<  161<H>  4.0   |  23  |  5.79<H>    Ca    8.3<L>      19 Jul 2021 05:30  Phos  3.5     07-19  Mg     2.00     07-19    TPro  4.9<L>  /  Alb  2.1<L>  /  TBili  0.4  /  DBili      /  AST  21  /  ALT  6   /  AlkPhos  88  07-19    Creatinine Trend: 5.79 <--, 5.45 <--, 4.43 <--, 6.22 <--, 4.99 <--, 6.40 <--, 5.18 <--, 6.40 <--, 6.05 <--                        7.2    12.15 )-----------( 252      ( 19 Jul 2021 05:30 )             25.0     Urine Studies:        RADIOLOGY & ADDITIONAL STUDIES:   [facility] - S Eugene / Marlena S /D Cruz/ S Narendra/ WILY Macias/ Alexandre Esposito / WILMER Hermosillou/ O Jessi  service -[phone], office [phone]  ---------------------------------------------------------------------------------------------------------------    Patient seen and examined bedside    Subjective and Objective: No overnight events, off sedation, remains intubated on MV. family bedside    Allergies: lisinopril (Other)  opioid-like analgesics (Other)      Hospital Medications:   MEDICATIONS  (STANDING):  atorvastatin 40 milliGRAM(s) Oral at bedtime  chlorhexidine 0.12% Liquid 15 milliLiter(s) Oral Mucosa every 12 hours  chlorhexidine 2% Cloths 1 Application(s) Topical daily  dextrose 40% Gel 15 Gram(s) Oral once  dextrose 40% Gel 15 Gram(s) Oral once  dextrose 5%. 1000 milliLiter(s) (50 mL/Hr) IV Continuous <Continuous>  dextrose 5%. 1000 milliLiter(s) (100 mL/Hr) IV Continuous <Continuous>  dextrose 50% Injectable 25 Gram(s) IV Push once  dextrose 50% Injectable 12.5 Gram(s) IV Push once  dextrose 50% Injectable 25 Gram(s) IV Push once  diltiazem    Tablet 30 milliGRAM(s) Oral four times a day  epoetin danilo-epbx (RETACRIT) Injectable 87169 Unit(s) IV Push <User Schedule>  glucagon  Injectable 1 milliGRAM(s) IntraMuscular once  insulin lispro (ADMELOG) corrective regimen sliding scale   SubCutaneous every 6 hours  norepinephrine Infusion 0.05 MICROgram(s)/kG/Min (4.04 mL/Hr) IV Continuous <Continuous>  pantoprazole  Injectable 40 milliGRAM(s) IV Push every 12 hours    VITALS:  T(F): 99.2 (07-19-21 @ 12:00), Max: 99.2 (07-19-21 @ 12:00)  HR: 65 (07-19-21 @ 14:15)  BP: 117/46 (07-19-21 @ 14:00)  RR: 18 (07-19-21 @ 14:00)  SpO2: 100% (07-19-21 @ 14:15)  Wt(kg): --    07-18 @ 07:01  -  07-19 @ 07:00  --------------------------------------------------------  IN: 1162.6 mL / OUT: 0 mL / NET: 1162.6 mL    07-19 @ 07:01  -  07-19 @ 16:05  --------------------------------------------------------  IN: 11.2 mL / OUT: 0 mL / NET: 11.2 mL      PHYSICAL EXAM:  Constitutional: NAD, ETT+ on MV  HEENT: anicteric sclera  Neck: No JVD  Respiratory: CTAB, no wheezes, rales or rhonchi  Cardiovascular: S1, S2, RRR  Gastrointestinal: BS+, soft, NT/ND  Extremities: No peripheral edema  Neurological: awake, off sedation   : No wagner.   Vascular Access: avf+    LABS:  07-19    135  |  96<L>  |  38<H>  ----------------------------<  161<H>  4.0   |  23  |  5.79<H>    Ca    8.3<L>      19 Jul 2021 05:30  Phos  3.5     07-19  Mg     2.00     07-19    TPro  4.9<L>  /  Alb  2.1<L>  /  TBili  0.4  /  DBili      /  AST  21  /  ALT  6   /  AlkPhos  88  07-19    Creatinine Trend: 5.79 <--, 5.45 <--, 4.43 <--, 6.22 <--, 4.99 <--, 6.40 <--, 5.18 <--, 6.40 <--, 6.05 <--                        7.2    12.15 )-----------( 878      ( 19 Jul 2021 05:30 )             25.0     Urine Studies:        RADIOLOGY & ADDITIONAL STUDIES:

## 2021-07-19 NOTE — PROGRESS NOTE ADULT - ASSESSMENT
Assessment and Plan: 	  69 yo M with PMhx of CAD s/p CABG (course at that time complicated by pleural effusion requiring chest tubes), ESRD on HD (MWF), DMII, HTN, afib on coumadin, and anemia transferred from [facility] for evaluation of chest pain after HD and GIB s/p 2U PRBC transfusion and aflutter/afib with RVR managed with metoprolol. Admitted to MICU for hypotension dependent on pressors likely 2/2 to sepsis. In MICU, patient had a cardiac arrest on 7/13 requiring CPR, 3x epi, and 2x shock (for VTach).    #Neuro  - Off of sedation. Patient opens eyes on command and moves his toes. He is not able move his fingers or   - CT head shows foci of low density in the R anterolateral frontal lobe and L posterior temporal lobe concerning for acute infarcts.  - Mechanism likely cardioembolic in setting of patient with known atrial fibrillation with cardiac arrest vs septic emboli  - per neurology, can consider MRI brain w/o contrast. However, unlikely to  since patient has known atrial fibrillation and will need to be continued of full dose anticoagulation.  - Avoid hypotension, keep -180 for neuroprotection  - c/w Atorvastatin 40MG QHS for secondary stroke prevention.    #Cardiovascular  - Cardiac arrest: Patient had asystole night of 7/13 and CPR was started. S/p epi x3 and shock x2 with return to NSR. Cards is following  - TTE on 7/13 showed Mild mitral regurgitation with biventricular dysfunction of LV and RV systolic function. Endocardium was not well visualized, Hypokinesis of the basal inferior wall and basal inferoseptum.  - TRUMAN scheduled for today 7/19, per cardiology.   - c/w Levophed wean as tolerated.   - BP goal 140-180 to maintain cerebral perfusion after stroke  - Will consider switching to midodrine once HR stable  - Troponins 2400, CKMB 7.1. Troponin stable from prior to cardiac arrest. They are likely 2/2 chronic condition/ESRD on HD vs Hypotensive event. Will continue to trend and monitor.    A. Fib  - on coumadin at home,  - c/ heparin gtt prophylaxis. PTT improved, patient on heparin. Awaiting TRUMAN for monday. Holding heparin for now. Will restart after TRUMAN.  - From neurology stand point, heparin is sufficient for now. Will need to be switched to DOAC in the future if kidney function allows  - 7/11: tried to transition heparin gtt to eliquis but pt is not a candidate due to his acute MIGUEL; back on heparin gtt  - c/ diltiazem for rate control    #Respiratory  - Loculated Pleural effusions: chest tube removed   - pleural fluid cx: (-); transudative.   - intubated and on ventilator (24/400/5/40). F/u ABG and adjust as necessary  - CXR s/p chest tube removal showed no evidence of atelectasis. Demonstrated loculated left pleural effusion decreased in size and new hazy right lower lung opacity which could be due to a small layering right pleural effusion with associated passive atelectasis, atelectasis of other cause, and/or pneumonia.  - abx as below    #GI/Nutrition:  - NPO with tube feed. Holding feeds for TRUMAN today at 7/19. Will restart after TRUMAN  - Was c/o abdominal pain on presentation, bedside POCUS didn't show ascites. No abdominal pain at this time. Continue to monitor.   - negative occult bleeding, hgb stable  - Per GI, patient is stable to get TRUMAN and does not need endoscopic evaluation prior to TRUMAN.   - Zofran PRN for Nausea.    #/Renal  - ESRD: HD on MWF. Most recent Scr 4.0. Potassium is stable.   - Last dialysis session 7/17. Per nephro, will received dialysis again 7/20  - Electrolytes stable  - c/w Epo 20k tiw with HD      #Skin  - HD Access: fistula in left upper arm  - Improving erythematous and tender on right wrist. Continue to monitor. RUE US neg for thrombus  - US of fistula showed patent fistula with no abscess      #ID  - S aureus PCR: (+),  MRSA PCR negative  - BCx on 7/7 now growing MRSA: restarted on vanc (7/11- )  - Vanc 7/19 was 23. Will hold vanco today. Repeat levels tomorrow.   - Surveillance blood Cx have been negative for 48hours, no longer need to continue    #Endocrine  - DMII: c/w Lispro SS. Monitor glucose.      #Hematologic/DVT ppx  -monitor PTT        #Ethics  - full code as per wife- full code as per wife Assessment and Plan: 	  69 yo M with PMhx of CAD s/p CABG (course at that time complicated by pleural effusion requiring chest tubes), ESRD on HD (MWF), DMII, HTN, afib on coumadin, and anemia transferred from [facility] for evaluation of chest pain after HD and GIB s/p 2U PRBC transfusion and aflutter/afib with RVR managed with metoprolol. Admitted to MICU for hypotension dependent on pressors likely 2/2 to sepsis. In MICU, patient had a cardiac arrest on 7/13 requiring CPR, 3x epi, and 2x shock (for VTach).    #Neuro  - Off of sedation. Patient opens eyes on command and moves his toes. He is not able move his fingers or   - CT head shows foci of low density in the R anterolateral frontal lobe and L posterior temporal lobe concerning for acute infarcts.  - Mechanism likely cardioembolic in setting of patient with known atrial fibrillation with cardiac arrest vs septic emboli  - per neurology, can consider MRI brain w/o contrast. However, unlikely to  since patient has known atrial fibrillation and will need to be continued of full dose anticoagulation.  - Avoid hypotension, keep -180 for neuroprotection  - c/w Atorvastatin 40MG QHS for secondary stroke prevention.    #Cardiovascular  - Cardiac arrest: Patient had asystole night of 7/13 and CPR was started. S/p epi x3 and shock x2 with return to NSR. Cards is following  - TTE on 7/13 showed Mild mitral regurgitation with biventricular dysfunction of LV and RV systolic function. Endocardium was not well visualized, Hypokinesis of the basal inferior wall and basal inferoseptum.  - TRUMAN scheduled for today 7/19, per cardiology.   - c/w Levophed wean as tolerated.   - BP goal 140-180 to maintain cerebral perfusion after stroke  - Will consider switching to midodrine once HR stable  - Troponins 2400, CKMB 7.1. Troponin stable from prior to cardiac arrest. They are likely 2/2 chronic condition/ESRD on HD vs Hypotensive event. Will continue to trend and monitor.    A. Fib  - on coumadin at home,  - c/ heparin gtt prophylaxis. PTT improved, patient on heparin. Awaiting TRUMAN for monday. Holding heparin for now. Will restart after TRUMAN.  - From neurology stand point, heparin is sufficient for now. Will need to be switched to DOAC in the future if kidney function allows  - 7/11: tried to transition heparin gtt to eliquis but pt is not a candidate due to his acute MIGUEL; back on heparin gtt  - c/ diltiazem for rate control    #Respiratory  - Loculated Pleural effusions: chest tube removed  - pleural fluid cx: (-); transudative.  - intubated and on ventilator (24/400/5/40). F/u ABG and adjust as necessary  - Will try CPAP trial after TRUMAN  - CXR s/p chest tube removal showed no evidence of atelectasis. Demonstrated loculated left pleural effusion decreased in size and new hazy right lower lung opacity which could be due to a small layering right pleural effusion with associated passive atelectasis, atelectasis of other cause, and/or pneumonia.  - abx as below    #GI/Nutrition:  - NPO with tube feed. Holding feeds for TRUMAN today at 7/19. Will restart after TRUMAN  - Was c/o abdominal pain on presentation, bedside POCUS didn't show ascites. No abdominal pain at this time. Continue to monitor.   - negative occult bleeding, hgb stable  - Per GI, patient is stable to get TRUMAN and does not need endoscopic evaluation prior to TRUMAN.   - Zofran PRN for Nausea.    #/Renal  - ESRD: HD on MWF. Most recent Scr 4.0. Potassium is stable.   - Last dialysis session 7/17. Per nephro, will received dialysis again 7/20  - Electrolytes stable  - c/w Epo 20k tiw with HD      #Skin  - HD Access: fistula in left upper arm  - Improving erythematous and tender on right wrist. Continue to monitor. RUE US neg for thrombus  - US of fistula showed patent fistula with no abscess  - Has an arrow on right arm, will do ultrasound to assess patency,      #ID  - S aureus PCR: (+),  MRSA PCR negative  - BCx on 7/7 now growing MRSA: restarted on vanc (7/11- )  - Vanc 7/19 was 23. Will hold vanco today. Repeat levels tomorrow.   - Surveillance blood Cx have been negative for 48hours, no longer need to continue    #Endocrine  - DMII: c/w Lispro SS. Monitor glucose.      #Hematologic/DVT ppx  -monitor PTT  - heparin prophylaxis. Holding right now for TRUMAN. Will restart after TRUMAN        #Ethics  - full code as per wife- full code as per wife

## 2021-07-19 NOTE — PROGRESS NOTE ADULT - ASSESSMENT
71 yo M with PMhx of CAD s/p CABG (course at that time complicated by pleural effusion requiring chest tubes), ESRD on HD (TTS), DMII, HTN, afib on coumadin, and anemia presented from [facility] for evaluation of chest pain and GIB. Renal following for ESRD Mx. s/p cardiac arrest. CTH showed acute infarcts.     ESRD  Maintenance schedule TTS  K, vol acceptable  Plan for next HD tuesday/tomorrow  Electrolytes within normal limits.   Improved hemodynamics, pressor titration as per ICU team  dose all meds for ESRD  plan for TRUMAN today. optimized renal stand point     Anemia in CKD+GIB- Hb <goal  c/w Epo 20k tiw with HD  monitor h/h, neg FOBT  GI following, No plans for cscope.     labs, chart reviewed  [facility]  Office [phone]  Ans Serv [phone]  Cell  [phone]   71 yo M with PMhx of CAD s/p CABG (course at that time complicated by pleural effusion requiring chest tubes), ESRD on HD (TTS), DMII, HTN, afib on coumadin, and anemia presented from [facility] for evaluation of chest pain and GIB. Renal following for ESRD Mx. s/p cardiac arrest. CTH showed acute infarcts.     ESRD  Maintenance schedule TTS  K, vol acceptable  Plan for next HD tomorrow w/2k btah, uf as tolerated by bp  no indication for hd today  Electrolytes within normal limits.   Improved hemodynamics, pressor titration as per ICU team. on Levophed wean as tolerated.   dose all meds for ESRD  plan for TRUMAN today. optimized renal stand point for TRUMAN    Anemia in CKD+GIB- Hb <goal  c/w Epo 20k tiw with HD  monitor h/h, neg FOBT  GI following, No plans for cscope.     poc d/w family bedside, ICU RN  labs, chart reviewed  [facility]  Office [phone]  Ans Serv [phone]  Cell  [phone]

## 2021-07-19 NOTE — PROGRESS NOTE ADULT - ASSESSMENT
69 yo M with PMhx of CAD s/p CABG (course at that time complicated by pleural effusion requiring chest tubes), ESRD on HD (MWF), DMII, HTN, afib on coumadin, and anemia presented from [facility] for evaluation of chest pain and GIB.       left sided pleural effusion: loculated and chronic:   CAD:  CABG:  ESRD:   DM:  HTN:  A fibrillation:   GI Bleed    7/7:    left sided pleural effusion: loculated and chronic: he has chr loculated pleural effusion on left side: He had chest tube placement in last June 2020 at another hospital: no chemistry is available but he had negative cytology at that time: This time the effusion seems slightly worse and has loculated effusion with pleural thickening: heis not SOB andhasbeen on roomair: I think , it at all, if this effusion needs to be dealt with : it is probably vats: will contact thoracic surgery : etiology not very clear as there is no previous chemistry: coult be exudative or transudatve: he is on HD and now it is a chr christiano effusion   CAD: cont per cards  CABG: cont current meds  ESRD: on HD   DM: monitor and control  HTN: controlled  A fibrillation: off ac:   GI Bleed: per gi :  DW pt and t eam1    7/8:    left sided pleural effusion:: fever: s/p left sided chest tube placement: cultures sent: however with pr criteria it seems transudative await serum LDH but pleural fluid LDH is low:  already started on broad spectrum antibiotics ID to see: await cultures:   CAD: cont per cards  CABG: cont current meds  ESRD: on HD   DM: monitor and control  HTN: controlled  A fibrillation: off ac:   GI Bleed: per gi :  DW pmd    7/9:    left sided pleural effusion:: fever: s/p left sided chest tube placement: cultures sent: however with pr criteria it seems borderline transudative and transudative by LDH criteria: the vulutres ahve been negative so far:  already started on broad spectrum antibiotics ID to see: await cultures: ? source of spesis  CAD: cont per cards  CABG: cont current meds  ESRD: on HD   DM: monitor and control  HTN: in shock: now on vasopressors:   A fibrillation: off ac:   GI Bleed: per gi :  DW pmd  micu team    7/11:      left sided pleural effusion:: fever: s/p left sided chest tube placement: cultures sent: however with pr criteria it seems borderline transudative and transudative by LDH criteria: the cultures have been negative so far:  already started on broad spectrum antibiotics : Has MSSA bactermia  CAD: cont per cards: still on vasopressors: wean as tolerated ? needs molly ro tule out IE : would defer to cards:   CABG: cont current meds  ESRD: on HD   DM: monitor and control  HTN: in shock: now on vasopressors:   A fibrillation: off ac:   GI Bleed: per gi :  DW pmd  micu team    7/12:    left sided pleural effusion:: fever: s/p left sided chest tube placement:- now removed: cultures sent: however with pr criteria it seems borderline transudative and transudative by LDH criteria: the cultures have been negative so far:  already started on broad spectrum antibiotics : Has MRSA bactermia: vanco restarted yesterday ? echo/molly  Ac hypercarbic resp fialure: needs to be watched closelyin MICU : needs bipap may need intubation" At this time he is alert and awake: and responds to simple questions:    CAD: cont per cards: still on vasopressors: wean as tolerated ? needs molly ro tule out IE : would defer to cards:   CABG: cont current meds  ESRD: on HD   DM: monitor and control  HTN: in shock: now on vasopressors:   A fibrillation: off ac:   GI Bleed: per gi :  DW pmd  cards and bedstaff    7/13:  left sided pleural effusion:: fever: s/p left sided chest tube placement:- now removed: cultures sent: however with pr criteria it seems borderline transudative and transudative by LDH criteria: the cultures have been negative so far:  already started on broad spectrum antibiotics : Has MRSA bacteremia vanco restarted yesterday ? echo/molly  Ac hypercarbic resp fialure: now intubated s/p acls protocol last night   CAD: cont per cards: still on vasopressors: wean as tolerated ? needs molly ro tule out IE : would defer to cards:   CABG: cont current meds  ESRD: on HD   DM: monitor and control  HTN: in shock: now on vasopressors:   A fibrillation: off ac:   GI Bleed: per gi :  LINDA  staff  pt is critically ill now:     7/14:  left sided pleural effusion:: fever: s/p left sided chest tube placement:- now removed: cultures sent: however with pr criteria it seems borderline transudative and transudative by LDH criteria: the cultures have been negative so far:  already started on broad spectrum antibiotics : Has MRSA bacteremia vanco restarted yesterday now for  echo/molly  Ac hypercarbic resp fialure: now intubated s/p acls protocol last night -s/p cardiac arrest: ROSC 10 mts:   CAD: cont per cards: still on vasopressors: wean as tolerated ? needs molly ro tule out IE : would defer to cards: Cotn antbitioc  CABG: cont current meds  ESRD: on HD   DM: monitor and control  HTN: in shock: now on vasopressors:   A fibrillation: off ac:   GI Bleed: per gi :  DW  staff  pt is critically ill now: dw cards    7/15:    left sided pleural effusion:: transudative pl effusion with no with: certainly not the source for MRSA in blood:   Ac hypercarbic resp fialure: now intubated s/p acls protocol last night -s/p cardiac arrest: ROSC 10 mts:   CAD: cont per cards: still on vasopressors: wean as tolerated ? needs molly ro tule out IE : would defer to cards: Cotn antbitioc  CABG: cont current meds  ESRD: on HD   DM: monitor and control  HTN: in shock: now on vasopressors:   A fibrillation: on ac:   GI Bleed: per gi :  DW  staff  pt is critically ill now:     7/16"  left sided pleural effusion:: transudative pl effusion with no with: certainly not the source for MRSA in blood: ?source for MRSA:   Ac hypercarbic resp fialure: now intubated s/p acls protocol last night -s/p cardiac arrest: ROSC 10 mts: he seems to respond off sedation:per MICU note:   CAD: cont per cards: still on vasopressors: wean as tolerated ? needs molly ro tule out IE : would defer to cards: Cotn antbitioc  CABG: cont current meds  ESRD: on HD   DM: monitor and control  HTN: in shock: now on vasopressors:   A fibrillation: on ac:   GI Bleed: per gi :  DW  staff: PMD  pt is critically ill now:      7/17:    left sided pleural effusion:: transudative pl effusion with no with: certainly not the source for MRSA in blood: ?source for MRSA: for molly on monday  Ac hypercarbic resp fialure: now intubated s/p acls protocol last night -s/p cardiac arrest: ROSC 10 mts:   CAD: cont per cards: still on vasopressors: wean as tolerated ? needs molly ro tule out IE : would defer to cards: Cont antibiotics:   CABG: cont current meds  ESRD: on HD   DM: monitor and control  HTN: in shock: now on vasopressors:   A fibrillation: on ac:   GI Bleed: per gi :  DW  staff: PMD  pt is critically ill now:    7/18:    left sided pleural effusion:: transudative pl effusion with no with: certainly not the source for MRSA in blood: ?source for MRSA: for molly on monday: he is off sedation   Ac hypercarbic resp fialure: now intubated s/p acls protocol last night -s/p cardiac arrest: ROSC 10 mts:   Acute cva: noted on ct scan head: neurology following: already on heparin   CAD: cont per cards: still on vasopressors: wean as tolerated ? needs molly ro tule out IE : would defer to cards: Cont antibiotics:   CABG: cont current meds  ESRD: on HD   DM: monitor and control  HTN: in shock: now on vasopressors:   A fibrillation: on ac:   GI Bleed: per gi :  pt is critically ill now:    7/19:      left sided pleural effusion:: transudative pl effusion with no with: certainly not the source for MRSA in blood: ?source for MRSA: for molly today: he is off sedation but still on pressors:   Ac hypercarbic resp fialure: now intubated s/p acls protocol last night -s/p cardiac arrest: ROSC 10 mts:   Acute cva: noted on ct scan head: neurology following: already on heparin   CAD: cont per cards: still on vasopressors: wean as tolerated ? needs molly ro tule out IE : would defer to cards: Cont antibiotics: for molly today  CABG: cont current meds  ESRD: on HD   DM: monitor and control  HTN: in shock: now on vasopressors:   A fibrillation: on ac:   GI Bleed: per gi :  pt is critically ill now:

## 2021-07-19 NOTE — PROGRESS NOTE ADULT - SUBJECTIVE AND OBJECTIVE BOX
Mainor Bowers  PGY-1 | Internal Medicine      INTERVAL HPI/OVERNIGHT EVENTS: No acute events O/N.    SUBJECTIVE: Patient seen and examined at bedside. Patient is intubated    ROS: Unable to assess    OBJECTIVE:    VITAL SIGNS:  ICU Vital Signs Last 24 Hrs  T(C): 37.3 (19 Jul 2021 08:00), Max: 37.3 (19 Jul 2021 08:00)  T(F): 99.1 (19 Jul 2021 08:00), Max: 99.1 (19 Jul 2021 08:00)  HR: 67 (19 Jul 2021 09:00) (62 - 81)  BP: 102/85 (19 Jul 2021 09:00) (94/40 - 133/40)  BP(mean): 88 (19 Jul 2021 09:00) (48 - 88)  RR: 15 (19 Jul 2021 09:00) (14 - 20)  SpO2: 100% (19 Jul 2021 09:00) (97% - 100%)    Mode: AC/ CMV (Assist Control/ Continuous Mandatory Ventilation), RR (machine): 14, TV (machine): 400, FiO2: 40, PEEP: 5, ITime: 0.64, MAP: 9, PIP: 26    07-18 @ 07:01 - 07-19 @ 07:00  --------------------------------------------------------  IN: 1162.6 mL / OUT: 0 mL / NET: 1162.6 mL    07-19 @ 07:01 - 07-19 @ 09:19  --------------------------------------------------------  IN: 1.6 mL / OUT: 0 mL / NET: 1.6 mL      CAPILLARY BLOOD GLUCOSE      POCT Blood Glucose.: 167 mg/dL (19 Jul 2021 05:49)      PHYSICAL EXAM:    General: NAD. Patient is awake. Patient is intubated and not sedated  HEENT: NC/AT; PERRL, clear conjunctiva  Neck: supple  Respiratory: CTA b/l  Cardiovascular: +S1/S2; RRR  Abdomen: soft, NT/ND; +BS x4  Extremities: WWP, 2+ peripheral pulses b/l; no LE edema  Skin: normal color and turgor; no rash  Neurological: Awake. Opens eyes and follows certain commands    MEDICATIONS:  MEDICATIONS  (STANDING):  aspirin  chewable 81 milliGRAM(s) Oral daily  atorvastatin 40 milliGRAM(s) Oral at bedtime  chlorhexidine 0.12% Liquid 15 milliLiter(s) Oral Mucosa every 12 hours  chlorhexidine 2% Cloths 1 Application(s) Topical daily  dextrose 40% Gel 15 Gram(s) Oral once  dextrose 40% Gel 15 Gram(s) Oral once  dextrose 5%. 1000 milliLiter(s) (50 mL/Hr) IV Continuous <Continuous>  dextrose 5%. 1000 milliLiter(s) (100 mL/Hr) IV Continuous <Continuous>  dextrose 50% Injectable 25 Gram(s) IV Push once  dextrose 50% Injectable 12.5 Gram(s) IV Push once  dextrose 50% Injectable 25 Gram(s) IV Push once  diltiazem    Tablet 30 milliGRAM(s) Oral four times a day  epoetin danilo-epbx (RETACRIT) Injectable 30395 Unit(s) IV Push <User Schedule>  glucagon  Injectable 1 milliGRAM(s) IntraMuscular once  insulin lispro (ADMELOG) corrective regimen sliding scale   SubCutaneous every 6 hours  norepinephrine Infusion 0.05 MICROgram(s)/kG/Min (4.04 mL/Hr) IV Continuous <Continuous>  pantoprazole  Injectable 40 milliGRAM(s) IV Push every 12 hours    MEDICATIONS  (PRN):  acetaminophen   Tablet .. 1000 milliGRAM(s) Oral every 6 hours PRN Temp greater or equal to 38C (100.4F), Mild Pain (1 - 3), Moderate Pain (4 - 6)  calamine/zinc oxide Lotion 1 Application(s) Topical three times a day PRN Itching  heparin   Injectable 7000 Unit(s) IV Push every 6 hours PRN For aPTT less than 40  heparin   Injectable 3500 Unit(s) IV Push every 6 hours PRN For aPTT between 40 - 57      ALLERGIES:  Allergies    lisinopril (Other)  opioid-like analgesics (Other)    Intolerances        LABS:                        7.8    13.10 )-----------( 144      ( 18 Jul 2021 23:49 )             27.1     07-19    135  |  96<L>  |  38<H>  ----------------------------<  161<H>  4.0   |  23  |  5.79<H>    Ca    8.3<L>      19 Jul 2021 05:30  Phos  3.5     07-19  Mg     2.00     07-19    TPro  4.9<L>  /  Alb  2.1<L>  /  TBili  0.4  /  DBili  x   /  AST  21  /  ALT  6   /  AlkPhos  88  07-19    PT/INR - ( 19 Jul 2021 05:30 )   PT: 16.6 sec;   INR: 1.48 ratio         PTT - ( 19 Jul 2021 05:30 )  PTT:69.5 sec      RADIOLOGY & ADDITIONAL TESTS: Reviewed.

## 2021-07-19 NOTE — PROGRESS NOTE ADULT - SUBJECTIVE AND OBJECTIVE BOX
EP     tele: AFib, 4 NSVT  prior PEA/Jay/Asystole arrest.  resuscitated and intubated, on pressors.  antibiotics for MRSA bacteremia.  CT head w/ interval hypodensities concerning for stroke.    Review of Systems:   Constitutional: [ ] fevers, [ ] chills.   Skin: [ ] dry skin. [ ] rashes.  Psychiatric: [ ] depression, [ ] anxiety.   Gastrointestinal: [ ] BRBPR, [ ] melena.   Neurological: [ ] confusion. [ ] seizures. [ ] shuffling gait.   Ears,Nose,Mouth and Throat: [ ] ear pain [ ] sore throat.   Eyes: [ ] diplopia.   Respiratory: [ ] hemoptysis. [ ] shortness of breath  Cardiovascular: See HPI above  Hematologic/Lymphatic: [ ] anemia. [ ] painful nodes. [ ] prolonged bleeding.   Genitourinary: [ ] hematuria. [ ] flank pain.   Endocrine: [ ] significant change in weight. [ ] intolerance to heat and cold.     Review of systems [ ] otherwise negative, [x ] otherwise unable to obtain    FH: no family history of sudden cardiac death in first degree relatives    SH: [ ] tobacco, [ ] alcohol, [ ] drugs    acetaminophen   Tablet .. 1000 milliGRAM(s) Oral every 6 hours PRN  atorvastatin 40 milliGRAM(s) Oral at bedtime  calamine/zinc oxide Lotion 1 Application(s) Topical three times a day PRN  chlorhexidine 0.12% Liquid 15 milliLiter(s) Oral Mucosa every 12 hours  chlorhexidine 2% Cloths 1 Application(s) Topical daily  dextrose 40% Gel 15 Gram(s) Oral once  dextrose 40% Gel 15 Gram(s) Oral once  dextrose 5%. 1000 milliLiter(s) IV Continuous <Continuous>  dextrose 5%. 1000 milliLiter(s) IV Continuous <Continuous>  dextrose 50% Injectable 25 Gram(s) IV Push once  dextrose 50% Injectable 12.5 Gram(s) IV Push once  dextrose 50% Injectable 25 Gram(s) IV Push once  diltiazem    Tablet 30 milliGRAM(s) Oral four times a day  epoetin danilo-epbx (RETACRIT) Injectable 87265 Unit(s) IV Push <User Schedule>  glucagon  Injectable 1 milliGRAM(s) IntraMuscular once  heparin   Injectable 7000 Unit(s) IV Push every 6 hours PRN  heparin   Injectable 3500 Unit(s) IV Push every 6 hours PRN  insulin lispro (ADMELOG) corrective regimen sliding scale   SubCutaneous every 6 hours  norepinephrine Infusion 0.05 MICROgram(s)/kG/Min IV Continuous <Continuous>  pantoprazole  Injectable 40 milliGRAM(s) IV Push every 12 hours                            7.2    12.15 )-----------( 150      ( 19 Jul 2021 05:30 )             25.0       07-19    135  |  96<L>  |  38<H>  ----------------------------<  161<H>  4.0   |  23  |  5.79<H>    Ca    8.3<L>      19 Jul 2021 05:30  Phos  3.5     07-19  Mg     2.00     07-19    TPro  4.9<L>  /  Alb  2.1<L>  /  TBili  0.4  /  DBili  x   /  AST  21  /  ALT  6   /  AlkPhos  88  07-19    T(C): 37.3 (07-19-21 @ 12:00), Max: 37.3 (07-19-21 @ 08:00)  HR: 64 (07-19-21 @ 14:00) (62 - 81)  BP: 105/30 (07-19-21 @ 14:00) (94/40 - 133/40)  RR: 18 (07-19-21 @ 14:00) (14 - 20)  SpO2: 99% (07-19-21 @ 14:00) (97% - 100%)      General: ill appearing, intubated, sedation weaned  Head: Normocephalic and atraumatic.   Neck: No JVD. No bruits. Supple. Does not appear to be enlarged.  Left IJ CVL.  Intubated.    Cardiovascular: + S1,S2 ; IRR Soft systolic murmur at the left lower sternal border. No rubs noted.    Lungs: coarse breath sounds bL.   Abdomen: + BS, soft. Non distended.   Extremities: limbs warm, anasarca.  Psychiatric: unable to assess due to vent  Musculoskeletal: unable to assess strength and ROM     A/P) 71 y/o male PMH CAD s/p CABG, PAF/AFL on coumadin, HTN, hyperlipidemia, DM, ESRD on HD, hypothyroidism a/w sepsis    -continue diltiazem for AF rate control.  -heparin infusion for secondary stroke prevention  -s/p PEA/jay/asystole arrest, secondary to critical illness and infection.  no pacemaker or ICD indicated.  -no further inpatient EP workup needed    - awaiting TRUMAN  -wean pressors and vent per MICU

## 2021-07-19 NOTE — PROGRESS NOTE ADULT - ASSESSMENT
70 year old male with possible GI bleed     Constipation   improving   Senna qhs and titrate Miralax qd to bid as needed   Suppository vs Enema as needed     Anemia  negative occult w/o overt gi bleeding  Monitor CBC and Keep hemoglobin > 8 given cardiac issues   PPI BID w/Maalox PRN    Monitor stool color (brown)  No GI objection to ASA or Heparin gtt while continuing ppi therapy  No GI objection to proceeding w/TRUMAN     Ascites  2/2 RHF w/dilated IVC on CT   diuresis per cardiology     Cardiac Arrest  Care per cardiology and MICU appreciated     CVA, Embolic   care per neurology appreciated     Advanced care planning was discussed with patient.  Advanced care planning forms were reviewed and discussed.  Risks, benefits and alternatives of gastroenterologic procedures were discussed in detail and all questions were answered.  30 minutes spent.

## 2021-07-19 NOTE — PROGRESS NOTE ADULT - ASSESSMENT
70 year old with ESRD, DM, CAD presented with anemia and chest pain    Recently diagnosed Staph aureus bacteremia  Course complicated by arrest, imaging with ? CVA    1)Staph bacteremia- ? phlebitis as focus  -prelim results suggested MSSA  now identified as MRSA-     Dose Vancomycin based on daily level     Repeat blood cultures 7/9 with growth in 2 of 4 bottles  Repeat blood culture 7/11 and 7/12 without growth    Check TRUMAN  Consider imaging LS spine when more stable    LUE HD access appears to be a fistula but history states graft- consider US  to confirm    2) Fever  Likely due to bacteremia  Monitor    3) Pleural effusion  S/p thoracentesis- appears transudative     4) Leukocytosis  continue to monitor

## 2021-07-19 NOTE — PROGRESS NOTE ADULT - SUBJECTIVE AND OBJECTIVE BOX
INTERVAL HPI/OVERNIGHT EVENTS:    intubated in ICU   brown stools yesterday     MEDICATIONS  (STANDING):  atorvastatin 40 milliGRAM(s) Oral at bedtime  chlorhexidine 0.12% Liquid 15 milliLiter(s) Oral Mucosa every 12 hours  chlorhexidine 2% Cloths 1 Application(s) Topical daily  dextrose 40% Gel 15 Gram(s) Oral once  dextrose 40% Gel 15 Gram(s) Oral once  dextrose 5%. 1000 milliLiter(s) (50 mL/Hr) IV Continuous <Continuous>  dextrose 5%. 1000 milliLiter(s) (100 mL/Hr) IV Continuous <Continuous>  dextrose 50% Injectable 25 Gram(s) IV Push once  dextrose 50% Injectable 12.5 Gram(s) IV Push once  dextrose 50% Injectable 25 Gram(s) IV Push once  diltiazem    Tablet 30 milliGRAM(s) Oral four times a day  epoetin danilo-epbx (RETACRIT) Injectable 66877 Unit(s) IV Push <User Schedule>  glucagon  Injectable 1 milliGRAM(s) IntraMuscular once  insulin lispro (ADMELOG) corrective regimen sliding scale   SubCutaneous every 6 hours  norepinephrine Infusion 0.05 MICROgram(s)/kG/Min (4.04 mL/Hr) IV Continuous <Continuous>  pantoprazole  Injectable 40 milliGRAM(s) IV Push every 12 hours    MEDICATIONS  (PRN):  acetaminophen   Tablet .. 1000 milliGRAM(s) Oral every 6 hours PRN Temp greater or equal to 38C (100.4F), Mild Pain (1 - 3), Moderate Pain (4 - 6)  calamine/zinc oxide Lotion 1 Application(s) Topical three times a day PRN Itching  heparin   Injectable 7000 Unit(s) IV Push every 6 hours PRN For aPTT less than 40  heparin   Injectable 3500 Unit(s) IV Push every 6 hours PRN For aPTT between 40 - 57      Allergies    lisinopril (Other)  opioid-like analgesics (Other)    Intolerances        Review of Systems: *intubated, unobtainable      Vital Signs Last 24 Hrs  T(C): 37.3 (19 Jul 2021 12:00), Max: 37.3 (19 Jul 2021 08:00)  T(F): 99.2 (19 Jul 2021 12:00), Max: 99.2 (19 Jul 2021 12:00)  HR: 64 (19 Jul 2021 14:00) (62 - 81)  BP: 105/30 (19 Jul 2021 14:00) (94/40 - 133/40)  BP(mean): 48 (19 Jul 2021 14:00) (48 - 88)  RR: 18 (19 Jul 2021 14:00) (14 - 20)  SpO2: 99% (19 Jul 2021 14:00) (97% - 100%)    PHYSICAL EXAM:    Constitutional: NAD  HEENT: EOMI, throat clear  Neck: No LAD, supple  Respiratory: intubated  Cardiovascular: S1 and S2, RRR, no M  Gastrointestinal: BS+, soft, NT/ND, neg HSM,  Extremities: No peripheral edema, neg clubbing, cyanosis  Vascular: 2+ peripheral pulses  Neurological: A/O ; Intubated  Psychiatric: Normal mood, normal affect  Skin: No rashes      LABS:                        7.2    12.15 )-----------( 150      ( 19 Jul 2021 05:30 )             25.0     07-19    135  |  96<L>  |  38<H>  ----------------------------<  161<H>  4.0   |  23  |  5.79<H>    Ca    8.3<L>      19 Jul 2021 05:30  Phos  3.5     07-19  Mg     2.00     07-19    TPro  4.9<L>  /  Alb  2.1<L>  /  TBili  0.4  /  DBili  x   /  AST  21  /  ALT  6   /  AlkPhos  88  07-19    PT/INR - ( 19 Jul 2021 05:30 )   PT: 16.6 sec;   INR: 1.48 ratio         PTT - ( 19 Jul 2021 05:30 )  PTT:69.5 sec      RADIOLOGY & ADDITIONAL TESTS:

## 2021-07-20 LAB
ALBUMIN SERPL ELPH-MCNC: 2.3 G/DL — LOW (ref 3.3–5)
ALP SERPL-CCNC: 94 U/L — SIGNIFICANT CHANGE UP (ref 40–120)
ALT FLD-CCNC: 6 U/L — SIGNIFICANT CHANGE UP (ref 4–41)
ANION GAP SERPL CALC-SCNC: 18 MMOL/L — HIGH (ref 7–14)
APTT BLD: 45.3 SEC — HIGH (ref 27–36.3)
APTT BLD: 64.6 SEC — HIGH (ref 27–36.3)
AST SERPL-CCNC: 26 U/L — SIGNIFICANT CHANGE UP (ref 4–40)
BASOPHILS # BLD AUTO: 0.07 K/UL — SIGNIFICANT CHANGE UP (ref 0–0.2)
BASOPHILS NFR BLD AUTO: 0.5 % — SIGNIFICANT CHANGE UP (ref 0–2)
BILIRUB SERPL-MCNC: 1 MG/DL — SIGNIFICANT CHANGE UP (ref 0.2–1.2)
BUN SERPL-MCNC: 47 MG/DL — HIGH (ref 7–23)
CALCIUM SERPL-MCNC: 8.5 MG/DL — SIGNIFICANT CHANGE UP (ref 8.4–10.5)
CHLORIDE SERPL-SCNC: 92 MMOL/L — LOW (ref 98–107)
CO2 SERPL-SCNC: 23 MMOL/L — SIGNIFICANT CHANGE UP (ref 22–31)
CREAT SERPL-MCNC: 6.78 MG/DL — HIGH (ref 0.5–1.3)
EOSINOPHIL # BLD AUTO: 0.05 K/UL — SIGNIFICANT CHANGE UP (ref 0–0.5)
EOSINOPHIL NFR BLD AUTO: 0.4 % — SIGNIFICANT CHANGE UP (ref 0–6)
GLUCOSE BLDC GLUCOMTR-MCNC: 116 MG/DL — HIGH (ref 70–99)
GLUCOSE BLDC GLUCOMTR-MCNC: 127 MG/DL — HIGH (ref 70–99)
GLUCOSE BLDC GLUCOMTR-MCNC: 147 MG/DL — HIGH (ref 70–99)
GLUCOSE BLDC GLUCOMTR-MCNC: 148 MG/DL — HIGH (ref 70–99)
GLUCOSE SERPL-MCNC: 129 MG/DL — HIGH (ref 70–99)
HCT VFR BLD CALC: 21.6 % — LOW (ref 39–50)
HCT VFR BLD CALC: 25.9 % — LOW (ref 39–50)
HGB BLD-MCNC: 6.4 G/DL — CRITICAL LOW (ref 13–17)
HGB BLD-MCNC: 7.6 G/DL — LOW (ref 13–17)
IANC: 11.09 K/UL — HIGH (ref 1.5–8.5)
IMM GRANULOCYTES NFR BLD AUTO: 1.1 % — SIGNIFICANT CHANGE UP (ref 0–1.5)
LYMPHOCYTES # BLD AUTO: 0.71 K/UL — LOW (ref 1–3.3)
LYMPHOCYTES # BLD AUTO: 5.3 % — LOW (ref 13–44)
MAGNESIUM SERPL-MCNC: 2.1 MG/DL — SIGNIFICANT CHANGE UP (ref 1.6–2.6)
MCHC RBC-ENTMCNC: 28 PG — SIGNIFICANT CHANGE UP (ref 27–34)
MCHC RBC-ENTMCNC: 28.1 PG — SIGNIFICANT CHANGE UP (ref 27–34)
MCHC RBC-ENTMCNC: 29.3 GM/DL — LOW (ref 32–36)
MCHC RBC-ENTMCNC: 29.6 GM/DL — LOW (ref 32–36)
MCV RBC AUTO: 94.7 FL — SIGNIFICANT CHANGE UP (ref 80–100)
MCV RBC AUTO: 95.6 FL — SIGNIFICANT CHANGE UP (ref 80–100)
MONOCYTES # BLD AUTO: 1.23 K/UL — HIGH (ref 0–0.9)
MONOCYTES NFR BLD AUTO: 9.3 % — SIGNIFICANT CHANGE UP (ref 2–14)
NEUTROPHILS # BLD AUTO: 11.09 K/UL — HIGH (ref 1.8–7.4)
NEUTROPHILS NFR BLD AUTO: 83.4 % — HIGH (ref 43–77)
NRBC # BLD: 0 /100 WBCS — SIGNIFICANT CHANGE UP
NRBC # BLD: 0 /100 WBCS — SIGNIFICANT CHANGE UP
NRBC # FLD: 0 K/UL — SIGNIFICANT CHANGE UP
NRBC # FLD: 0 K/UL — SIGNIFICANT CHANGE UP
OB PNL STL: NEGATIVE — SIGNIFICANT CHANGE UP
PHOSPHATE SERPL-MCNC: 4.7 MG/DL — HIGH (ref 2.5–4.5)
PLATELET # BLD AUTO: 189 K/UL — SIGNIFICANT CHANGE UP (ref 150–400)
PLATELET # BLD AUTO: 223 K/UL — SIGNIFICANT CHANGE UP (ref 150–400)
POTASSIUM SERPL-MCNC: 4.5 MMOL/L — SIGNIFICANT CHANGE UP (ref 3.5–5.3)
POTASSIUM SERPL-SCNC: 4.5 MMOL/L — SIGNIFICANT CHANGE UP (ref 3.5–5.3)
PROT SERPL-MCNC: 5.2 G/DL — LOW (ref 6–8.3)
RBC # BLD: 2.28 M/UL — LOW (ref 4.2–5.8)
RBC # BLD: 2.71 M/UL — LOW (ref 4.2–5.8)
RBC # FLD: 20 % — HIGH (ref 10.3–14.5)
RBC # FLD: 20.5 % — HIGH (ref 10.3–14.5)
SODIUM SERPL-SCNC: 133 MMOL/L — LOW (ref 135–145)
VANCOMYCIN FLD-MCNC: 21.4 UG/ML — SIGNIFICANT CHANGE UP
WBC # BLD: 10.46 K/UL — SIGNIFICANT CHANGE UP (ref 3.8–10.5)
WBC # BLD: 13.29 K/UL — HIGH (ref 3.8–10.5)
WBC # FLD AUTO: 10.46 K/UL — SIGNIFICANT CHANGE UP (ref 3.8–10.5)
WBC # FLD AUTO: 13.29 K/UL — HIGH (ref 3.8–10.5)

## 2021-07-20 PROCEDURE — 93971 EXTREMITY STUDY: CPT | Mod: 26,LT

## 2021-07-20 PROCEDURE — 99232 SBSQ HOSP IP/OBS MODERATE 35: CPT

## 2021-07-20 PROCEDURE — 99291 CRITICAL CARE FIRST HOUR: CPT

## 2021-07-20 RX ORDER — HYDROMORPHONE HYDROCHLORIDE 2 MG/ML
0.5 INJECTION INTRAMUSCULAR; INTRAVENOUS; SUBCUTANEOUS ONCE
Refills: 0 | Status: DISCONTINUED | OUTPATIENT
Start: 2021-07-20 | End: 2021-07-21

## 2021-07-20 RX ORDER — HYDROMORPHONE HYDROCHLORIDE 2 MG/ML
0.25 INJECTION INTRAMUSCULAR; INTRAVENOUS; SUBCUTANEOUS ONCE
Refills: 0 | Status: DISCONTINUED | OUTPATIENT
Start: 2021-07-20 | End: 2021-07-20

## 2021-07-20 RX ORDER — VANCOMYCIN HCL 1 G
500 VIAL (EA) INTRAVENOUS ONCE
Refills: 0 | Status: COMPLETED | OUTPATIENT
Start: 2021-07-20 | End: 2021-07-20

## 2021-07-20 RX ORDER — NOREPINEPHRINE BITARTRATE/D5W 8 MG/250ML
0.02 PLASTIC BAG, INJECTION (ML) INTRAVENOUS
Qty: 16 | Refills: 0 | Status: DISCONTINUED | OUTPATIENT
Start: 2021-07-20 | End: 2021-07-23

## 2021-07-20 RX ADMIN — ATORVASTATIN CALCIUM 40 MILLIGRAM(S): 80 TABLET, FILM COATED ORAL at 21:10

## 2021-07-20 RX ADMIN — HYDROMORPHONE HYDROCHLORIDE 0.25 MILLIGRAM(S): 2 INJECTION INTRAMUSCULAR; INTRAVENOUS; SUBCUTANEOUS at 14:00

## 2021-07-20 RX ADMIN — CHLORHEXIDINE GLUCONATE 1 APPLICATION(S): 213 SOLUTION TOPICAL at 11:43

## 2021-07-20 RX ADMIN — HEPARIN SODIUM 2700 UNIT(S)/HR: 5000 INJECTION INTRAVENOUS; SUBCUTANEOUS at 04:02

## 2021-07-20 RX ADMIN — ERYTHROPOIETIN 20000 UNIT(S): 10000 INJECTION, SOLUTION INTRAVENOUS; SUBCUTANEOUS at 14:48

## 2021-07-20 RX ADMIN — Medication 4.04 MICROGRAM(S)/KG/MIN: at 08:40

## 2021-07-20 RX ADMIN — Medication 1.62 MICROGRAM(S)/KG/MIN: at 19:43

## 2021-07-20 RX ADMIN — PANTOPRAZOLE SODIUM 40 MILLIGRAM(S): 20 TABLET, DELAYED RELEASE ORAL at 05:35

## 2021-07-20 RX ADMIN — PANTOPRAZOLE SODIUM 40 MILLIGRAM(S): 20 TABLET, DELAYED RELEASE ORAL at 17:18

## 2021-07-20 RX ADMIN — Medication 1000 MILLIGRAM(S): at 10:00

## 2021-07-20 RX ADMIN — Medication 30 MILLIGRAM(S): at 05:35

## 2021-07-20 RX ADMIN — Medication 100 MILLIGRAM(S): at 17:48

## 2021-07-20 RX ADMIN — Medication 30 MILLIGRAM(S): at 17:18

## 2021-07-20 RX ADMIN — HYDROMORPHONE HYDROCHLORIDE 0.25 MILLIGRAM(S): 2 INJECTION INTRAMUSCULAR; INTRAVENOUS; SUBCUTANEOUS at 13:37

## 2021-07-20 RX ADMIN — Medication 1000 MILLIGRAM(S): at 10:41

## 2021-07-20 RX ADMIN — Medication 30 MILLIGRAM(S): at 11:43

## 2021-07-20 RX ADMIN — CHLORHEXIDINE GLUCONATE 15 MILLILITER(S): 213 SOLUTION TOPICAL at 05:35

## 2021-07-20 RX ADMIN — CHLORHEXIDINE GLUCONATE 15 MILLILITER(S): 213 SOLUTION TOPICAL at 17:18

## 2021-07-20 NOTE — PROGRESS NOTE ADULT - SUBJECTIVE AND OBJECTIVE BOX
Mainor Bowers  PGY-1 | Internal Medicine      INTERVAL HPI/OVERNIGHT EVENTS: Hgb 6.8 O/N s/p 1u of blood with improvement to 7.2. No other events o/n    SUBJECTIVE: Patient seen and examined at bedside. Patient is intubated. Patient complaints of right thigh pain by pointing and wincing.    ROS: Unable to assess    OBJECTIVE:    VITAL SIGNS:  ICU Vital Signs Last 24 Hrs  T(C): 36.7 (20 Jul 2021 12:00), Max: 37.3 (19 Jul 2021 16:00)  T(F): 98 (20 Jul 2021 12:00), Max: 99.1 (19 Jul 2021 16:00)  HR: 69 (20 Jul 2021 14:11) (62 - 84)  BP: 121/41 (20 Jul 2021 14:00) (84/28 - 137/33)  BP(mean): 61 (20 Jul 2021 14:00) (33 - 98)  RR: 14 (20 Jul 2021 14:00) (12 - 29)  SpO2: 100% (20 Jul 2021 14:11) (97% - 100%)    Mode: CPAP with PS, FiO2: 40, PEEP: 5, PS: 10, MAP: 9    07-19 @ 07:01 - 07-20 @ 07:00  --------------------------------------------------------  IN: 676 mL / OUT: 0 mL / NET: 676 mL    07-20 @ 07:01 - 07-20 @ 14:47  --------------------------------------------------------  IN: 124 mL / OUT: 0 mL / NET: 124 mL      CAPILLARY BLOOD GLUCOSE      POCT Blood Glucose.: 147 mg/dL (20 Jul 2021 11:39)      PHYSICAL EXAM:    General: NAD. Patient is awake. Patient is intubated and not sedated. Sitting comfortably  HEENT: NC/AT; PERRL, clear conjunctiva  Neck: supple  Respiratory: CTA b/l  Cardiovascular: +S1/S2; RRR  Abdomen: soft, NT/ND; +BS x4  Extremities: WWP, 2+ peripheral pulses b/l; no LE edema. Right thigh tight and tender. Unclear if worsen with movement as patient is not able to communicate. No pallor or paralysis  Skin: normal color and turgor; no rash  Neurological: Awake. Opens eyes and follows certain commands    MEDICATIONS:  MEDICATIONS  (STANDING):  atorvastatin 40 milliGRAM(s) Oral at bedtime  chlorhexidine 0.12% Liquid 15 milliLiter(s) Oral Mucosa every 12 hours  chlorhexidine 2% Cloths 1 Application(s) Topical daily  dextrose 40% Gel 15 Gram(s) Oral once  dextrose 40% Gel 15 Gram(s) Oral once  dextrose 5%. 1000 milliLiter(s) (50 mL/Hr) IV Continuous <Continuous>  dextrose 5%. 1000 milliLiter(s) (100 mL/Hr) IV Continuous <Continuous>  dextrose 50% Injectable 25 Gram(s) IV Push once  dextrose 50% Injectable 12.5 Gram(s) IV Push once  dextrose 50% Injectable 25 Gram(s) IV Push once  diltiazem    Tablet 30 milliGRAM(s) Oral four times a day  epoetin danilo-epbx (RETACRIT) Injectable 01153 Unit(s) IV Push <User Schedule>  glucagon  Injectable 1 milliGRAM(s) IntraMuscular once  insulin lispro (ADMELOG) corrective regimen sliding scale   SubCutaneous every 6 hours  norepinephrine Infusion 0.05 MICROgram(s)/kG/Min (4.04 mL/Hr) IV Continuous <Continuous>  pantoprazole  Injectable 40 milliGRAM(s) IV Push every 12 hours    MEDICATIONS  (PRN):  acetaminophen   Tablet .. 1000 milliGRAM(s) Oral every 6 hours PRN Temp greater or equal to 38C (100.4F), Mild Pain (1 - 3), Moderate Pain (4 - 6)  calamine/zinc oxide Lotion 1 Application(s) Topical three times a day PRN Itching      ALLERGIES:  Allergies    lisinopril (Other)  opioid-like analgesics (Other)    Intolerances        LABS:                        7.6    13.29 )-----------( 223      ( 20 Jul 2021 03:11 )             25.9     07-20    133<L>  |  92<L>  |  47<H>  ----------------------------<  129<H>  4.5   |  23  |  6.78<H>    Ca    8.5      20 Jul 2021 03:11  Phos  4.7     07-20  Mg     2.10     07-20    TPro  5.2<L>  /  Alb  2.3<L>  /  TBili  1.0  /  DBili  x   /  AST  26  /  ALT  6   /  AlkPhos  94  07-20    PT/INR - ( 19 Jul 2021 05:30 )   PT: 16.6 sec;   INR: 1.48 ratio         PTT - ( 20 Jul 2021 10:29 )  PTT:64.6 sec      RADIOLOGY & ADDITIONAL TESTS: Reviewed.

## 2021-07-20 NOTE — PROGRESS NOTE ADULT - SUBJECTIVE AND OBJECTIVE BOX
CARDIOLOGY ATTENDING    tele - SR    S: remains intubated but is moving extremities today; ros limited     Review of Systems:   Constitutional: [ ] fevers, [ ] chills.   Skin: [ ] dry skin. [ ] rashes.  Psychiatric: [ ] depression, [ ] anxiety.   Gastrointestinal: [ ] BRBPR, [ ] melena.   Neurological: [ ] confusion. [ ] seizures. [ ] shuffling gait.   Ears,Nose,Mouth and Throat: [ ] ear pain [ ] sore throat.   Eyes: [ ] diplopia.   Respiratory: [ ] hemoptysis. [ ] shortness of breath  Cardiovascular: See HPI above  Hematologic/Lymphatic: [ ] anemia. [ ] painful nodes. [ ] prolonged bleeding.   Genitourinary: [ ] hematuria. [ ] flank pain.   Endocrine: [ ] significant change in weight. [ ] intolerance to heat and cold.     Review of systems [ ] otherwise negative, [x ] otherwise unable to obtain    FH: no family history of sudden cardiac death in first degree relatives    SH: [ ] tobacco, [ ] alcohol, [ ] drugs    acetaminophen   Tablet .. 1000 milliGRAM(s) Oral every 6 hours PRN  atorvastatin 40 milliGRAM(s) Oral at bedtime  calamine/zinc oxide Lotion 1 Application(s) Topical three times a day PRN  chlorhexidine 0.12% Liquid 15 milliLiter(s) Oral Mucosa every 12 hours  chlorhexidine 2% Cloths 1 Application(s) Topical daily  dextrose 40% Gel 15 Gram(s) Oral once  dextrose 40% Gel 15 Gram(s) Oral once  dextrose 5%. 1000 milliLiter(s) IV Continuous <Continuous>  dextrose 5%. 1000 milliLiter(s) IV Continuous <Continuous>  dextrose 50% Injectable 25 Gram(s) IV Push once  dextrose 50% Injectable 12.5 Gram(s) IV Push once  dextrose 50% Injectable 25 Gram(s) IV Push once  diltiazem    Tablet 30 milliGRAM(s) Oral four times a day  epoetin danilo-epbx (RETACRIT) Injectable 15008 Unit(s) IV Push <User Schedule>  glucagon  Injectable 1 milliGRAM(s) IntraMuscular once  HYDROmorphone  Injectable 0.5 milliGRAM(s) IV Push once PRN  insulin lispro (ADMELOG) corrective regimen sliding scale   SubCutaneous every 6 hours  norepinephrine Infusion 0.02 MICROgram(s)/kG/Min IV Continuous <Continuous>  pantoprazole  Injectable 40 milliGRAM(s) IV Push every 12 hours                            7.6    13.29 )-----------( 223      ( 20 Jul 2021 03:11 )             25.9       07-20    133<L>  |  92<L>  |  47<H>  ----------------------------<  129<H>  4.5   |  23  |  6.78<H>    Ca    8.5      20 Jul 2021 03:11  Phos  4.7     07-20  Mg     2.10     07-20    TPro  5.2<L>  /  Alb  2.3<L>  /  TBili  1.0  /  DBili  x   /  AST  26  /  ALT  6   /  AlkPhos  94  07-20            T(C): 36.8 (07-20-21 @ 20:00), Max: 37.2 (07-20-21 @ 00:00)  HR: 69 (07-20-21 @ 21:00) (65 - 84)  BP: 117/27 (07-20-21 @ 21:00) (77/48 - 137/33)  RR: 19 (07-20-21 @ 21:00) (14 - 29)  SpO2: 100% (07-20-21 @ 21:00) (93% - 100%)  Wt(kg): --    I&O's Summary    19 Jul 2021 07:01  -  20 Jul 2021 07:00  --------------------------------------------------------  IN: 676 mL / OUT: 0 mL / NET: 676 mL    20 Jul 2021 07:01  -  20 Jul 2021 21:06  --------------------------------------------------------  IN: 235.2 mL / OUT: 0 mL / NET: 235.2 mL        General: intubated; sedated   Head: Normocephalic and atraumatic.   Neck: No JVD. No bruits. Supple. Does not appear to be enlarged.   Cardiovascular: + S1,S2 ; RRR Soft systolic murmur at the left lower sternal border. No rubs noted.    Lungs: decreased BS BL  Abdomen: + BS, soft. Non tender. Non distended. No rebound. No guarding.   Extremities: No clubbing/cyanosis/edema.   Neurologic: unable to assess  Skin: Warm and moist. The patient's skin has normal elasticity and good skin turgor.   Psychiatric: unable to assess   Musculoskeletal: unable to assess    DATA  < from: Transthoracic Echocardiogram (07.07.21 @ 18:17) >  ------------------------------------------------------------------------  CONCLUSIONS:  1. Mitral annular calcification, otherwise normal mitral  valve. Minimal mitral regurgitation.  2. Endocardium not well visualized; grossly normal left  ventricular systolic function.  3. Right ventricular enlargement with decreased right  ventricular systolic function.  4. Inadequate tricuspid regurgitation Doppler envelope  precludes estimation of RVSP.  ------------------------------------------------------------------------  Confirmed on  7/7/2021 - 21:16:20 by Osvaldo Bertrand M.D.,  [facility], [facility]    < end of copied text >    Tele: SR, 3 beats NSVT     A/P) 71 y/o male PMH CAD s/p CABG and old PCI, AFL x 1 year on coumadin, HTN, hyperlipidemia, DM, ESRD on HD, and hypothyroidism originally admitted to [facility] with GIB, transferred to [facility], found to have septic shock and MRSA bacteremia.     -Events noted; pt. s/p PEA arrest   -Repeat TTE with only mild segmental LV dysfunction in the inferoseptum  -Given negative CPK, do not suspect acs  -Suspect elevated troponin in the setting of demand ischemia from underlying septic shock  -Pressor support per MICU  -Monitor MS - follow up neuro   -EP follow up appreciated   -Given MRSA bacteremia, check TRUMAN to rule out endocarditis if family agreeable - appreciate GI eval - cleared by GI for TRUMAN    -Abx per ID  -Monitor BCx   -CTH head noted - pt. with cva - ac was restarted but now held due to dropping h/h   -TRUMAN with MV endocarditis  -CTS consultation with Dr. Herzog called - follow up recommendations     Kalie Lau MD

## 2021-07-20 NOTE — PROGRESS NOTE ADULT - SUBJECTIVE AND OBJECTIVE BOX
Date of Service: 07-20-21 @ 13:41    Patient is a 70y old  Male who presents with a chief complaint of Chest pain and GIB (20 Jul 2021 08:29)    Any change in ROS:   Awake & alert  Tolerating CPAP trials 10/5 per RN  Minimal secretions  No acute distress    MEDICATIONS  (STANDING):  atorvastatin 40 milliGRAM(s) Oral at bedtime  chlorhexidine 0.12% Liquid 15 milliLiter(s) Oral Mucosa every 12 hours  chlorhexidine 2% Cloths 1 Application(s) Topical daily  dextrose 40% Gel 15 Gram(s) Oral once  dextrose 40% Gel 15 Gram(s) Oral once  dextrose 5%. 1000 milliLiter(s) (50 mL/Hr) IV Continuous <Continuous>  dextrose 5%. 1000 milliLiter(s) (100 mL/Hr) IV Continuous <Continuous>  dextrose 50% Injectable 25 Gram(s) IV Push once  dextrose 50% Injectable 12.5 Gram(s) IV Push once  dextrose 50% Injectable 25 Gram(s) IV Push once  diltiazem    Tablet 30 milliGRAM(s) Oral four times a day  epoetin danilo-epbx (RETACRIT) Injectable 46076 Unit(s) IV Push <User Schedule>  glucagon  Injectable 1 milliGRAM(s) IntraMuscular once  insulin lispro (ADMELOG) corrective regimen sliding scale   SubCutaneous every 6 hours  norepinephrine Infusion 0.05 MICROgram(s)/kG/Min (4.04 mL/Hr) IV Continuous <Continuous>  pantoprazole  Injectable 40 milliGRAM(s) IV Push every 12 hours    MEDICATIONS  (PRN):  acetaminophen   Tablet .. 1000 milliGRAM(s) Oral every 6 hours PRN Temp greater or equal to 38C (100.4F), Mild Pain (1 - 3), Moderate Pain (4 - 6)  calamine/zinc oxide Lotion 1 Application(s) Topical three times a day PRN Itching    Vital Signs Last 24 Hrs  T(C): 36.7 (20 Jul 2021 08:00), Max: 37.3 (19 Jul 2021 16:00)  T(F): 98.1 (20 Jul 2021 08:00), Max: 99.1 (19 Jul 2021 16:00)  HR: 73 (20 Jul 2021 12:41) (62 - 84)  BP: 136/30 (20 Jul 2021 12:41) (84/28 - 137/33)  BP(mean): 81 (20 Jul 2021 10:00) (33 - 98)  RR: 21 (20 Jul 2021 12:41) (12 - 29)  SpO2: 99% (20 Jul 2021 12:41) (97% - 100%)  Mode: CPAP with PS  FiO2: 40  PEEP: 5  PS: 10  MAP: 9    I&O's Summary    19 Jul 2021 07:01  -  20 Jul 2021 07:00  --------------------------------------------------------  IN: 676 mL / OUT: 0 mL / NET: 676 mL    20 Jul 2021 07:01  -  20 Jul 2021 13:41  --------------------------------------------------------  IN: 104 mL / OUT: 0 mL / NET: 104 mL    Physical Exam:   GENERAL: NAD  HEENT: KATHY  ENMT: No tonsillar erythema, exudates, or enlargement  NECK: Supple, No JVD  CHEST/LUNG: Coarse BS b/l   CVS: Regular rate and rhythm; No murmurs, rubs, or gallops  GI: : Soft, Nontender, Nondistended; Bowel sounds present  NERVOUS SYSTEM:  Alert & Oriented X3, Good concentration; Motor Strength 5/5 B/L upper and lower extremities; DTRs 2+ intact and symmetric  EXTREMITIES:  2+ Peripheral Pulses, No clubbing, cyanosis, or edema  LYMPH: No lymphadenopathy noted  SKIN: No rashes or lesions  ENDOCRINOLOGY: No Thyromegaly  PSYCH: Appropriate    Labs:                   7.6    13.29 )-----------( 223      ( 20 Jul 2021 03:11 )             25.9                         6.8    14.29 )-----------( 219      ( 19 Jul 2021 21:15 )             23.4                         7.2    12.15 )-----------( 150      ( 19 Jul 2021 05:30 )             25.0                         7.8    13.10 )-----------( 144      ( 18 Jul 2021 23:49 )             27.1                         7.9    12.24 )-----------( 114      ( 18 Jul 2021 03:44 )             27.2                         9.0    13.73 )-----------( 163      ( 17 Jul 2021 06:27 )             31.1     07-20    133<L>  |  92<L>  |  47<H>  ----------------------------<  129<H>  4.5   |  23  |  6.78<H>  07-19    135  |  96<L>  |  38<H>  ----------------------------<  161<H>  4.0   |  23  |  5.79<H>  07-18    134<L>  |  96<L>  |  36<H>  ----------------------------<  177<H>  3.7   |  24  |  5.45<H>  07-18    135  |  96<L>  |  26<H>  ----------------------------<  195<H>  3.7   |  26  |  4.43<H>  07-17    133<L>  |  94<L>  |  38<H>  ----------------------------<  213<H>  4.3   |  22  |  6.22<H>    Ca    8.5      20 Jul 2021 03:11  Ca    8.3<L>      19 Jul 2021 05:30  Ca    8.4      18 Jul 2021 23:49  Phos  4.7     07-20  Phos  3.5     07-19  Phos  3.3     07-18  Mg     2.10     07-20  Mg     2.00     07-19  Mg     2.00     07-18    TPro  5.2<L>  /  Alb  2.3<L>  /  TBili  1.0  /  DBili  x   /  AST  26  /  ALT  6   /  AlkPhos  94  07-20  TPro  4.9<L>  /  Alb  2.1<L>  /  TBili  0.4  /  DBili  x   /  AST  21  /  ALT  6   /  AlkPhos  88  07-19  TPro  5.3<L>  /  Alb  2.3<L>  /  TBili  0.4  /  DBili  x   /  AST  24  /  ALT  6   /  AlkPhos  111  07-18  TPro  5.3<L>  /  Alb  2.5<L>  /  TBili  0.4  /  DBili  x   /  AST  21  /  ALT  <5  /  AlkPhos  105  07-18  TPro  5.8<L>  /  Alb  2.7<L>  /  TBili  0.5  /  DBili  x   /  AST  25  /  ALT  6   /  AlkPhos  110  07-17    CAPILLARY BLOOD GLUCOSE  POCT Blood Glucose.: 147 mg/dL (20 Jul 2021 11:39)  POCT Blood Glucose.: 148 mg/dL (20 Jul 2021 06:12)  POCT Blood Glucose.: 147 mg/dL (19 Jul 2021 23:04)  POCT Blood Glucose.: 169 mg/dL (19 Jul 2021 17:09)    LIVER FUNCTIONS - ( 20 Jul 2021 03:11 )  Alb: 2.3 g/dL / Pro: 5.2 g/dL / ALK PHOS: 94 U/L / ALT: 6 U/L / AST: 26 U/L / GGT: x           PT/INR - ( 19 Jul 2021 05:30 )   PT: 16.6 sec;   INR: 1.48 ratio    PTT - ( 20 Jul 2021 10:29 )  PTT:64.6 sec    Studies  Chest X-RAY < from: Xray Chest 1 View- PORTABLE-Urgent (Xray Chest 1 View- PORTABLE-Urgent .) (07.19.21 @ 20:01) >  INTERPRETATION:    Heart size and the mediastinum cannot be accurately evaluated on this projection.  ET tube tip is 3.4 cm above the joshua.  Enteric tube looped upon itself in the stomach. Tip and side port are in the stomach.  Left IJ line with tip in left brachiocephalic vein.  Unchanged patchy right upper lung opacity. Improved patchy right lower lung opacity. Worsening patchy left perihilar opacities.  There is increased left basilar and retrocardiac opacity, now with obscuration of the left hemidiaphragm.  No right pleural effusion. No pneumothorax.  Left subclavian vascular stent again seen.      IMPRESSION:  Enteric tube looped upon itself in the stomach. The tip and side port are in the stomach.    ET tube and left IJ line as above.    Unchanged patchy right upper lung opacity. Improved patchy right lower lung opacity. Worsening left perihilar opacities.    Increased left basilar and retrocardiac opacity which may be due to a left pleural effusion with passive atelectasis, atelectasis of other cause, and/or pneumonia.    < end of copied text >    CT SCAN Chest  < from: CT Chest w/ IV Cont (07.13.21 @ 18:36) >  FINDINGS:  CHEST:  LUNGS, LARGE AIRWAYS, AND PLEURA: Minimal secretions within the distal trachea and right main stem bronchus. Endotracheal tube is in place. Small bilateral pleural effusions,,partially loculated, with adjacent passive atelectasis. Vascular congestion and groundglass opacities.  VESSELS: Left IJ approach central venous catheter with the tip in the left brachiocephalic vein. Air is seen within the left brachiocephalic, likely secondary to injections. Left axillary vein stent is patent. Calcifications of the aortic arch and coronary vessels. CABG.  HEART: Cardiomegaly. Left ventricular free wall fatty replacement, likely related to prior myocardial infarction. No pericardial effusion. Retained epicardial pacemaker leads.  MEDIASTINUM AND YOANA: No lymphadenopathy.  CHEST WALL AND LOWER NECK: Enlarged thyroid gland with multiple hypodense nodules bilaterally, the right containing calcifications, appear unchanged. Median sternotomy.  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  BILE DUCTS: Normal caliber.  GALLBLADDER: Within normal limits.  SPLEEN: Within normal limits.  PANCREAS: Within normal limits.  ADRENALS: Within normal limits.  KIDNEYS/URETERS: Atrophic bilateral kidneys. No hydronephrosis. Exophytic 1.3 cm cyst arising from the left kidney.    BLADDER: Decompressed.  REPRODUCTIVE ORGANS: Prostate within normal limits. Bilateral hydroceles.    BOWEL: No bowel obstruction. Appendix is normal. Enteric tube is present with the tip in stomach.  PERITONEUM: Mild to moderate ascites in the perihepatic and perisplenic regions, tracking along the paracolic gutters. Trace ascites in the pelvis.  VESSELS: Atherosclerotic changes.  RETROPERITONEUM/LYMPH NODES: No lymphadenopathy.  ABDOMINAL WALL: Diffuse subcutaneous edema.  BONES: Degenerative changes.    IMPRESSION:  *  Mild pulmonary edema with bilateral partially loculated pleural effusions with adjacent passive atelectasis.  *  Debris within the right mainstem with associated partial atelectasis of the right lower lobe.  *  Mild to moderate ascites.      < end of copied text >    < from: TRUMAN w/o TTE (w/3D Echo) (07.19.21 @ 09:02) >  OBSERVATIONS:  Mitral Valve: A large (about  1.3 cm X 0.3 cm),  mobile  echodensity is visualized attached to the atrial aspect of  the medial (P3) scallop of the posterior mitral valve  leaflet and is consistent with a vegetation.  In addition,  a portion of the anterior mitral leaflet is markedly  thickened and this may represent sessile vegetation.  Moderate mitral regurgitation.  Vena contracta width about  0.4 - 0.5 cm.  Aortic Root: Normal aortic root.  Mild non-mobile  atherosclerotic plaque in the aortic arch.  Severe,  non-mobile atherosclerotic plaque in the descending  thoracic aorta.  Aortic Valve: Calcified trileaflet aortic valve with normal  opening.  No vegetations seen in association with the  aortic valve. No aortic valve regurgitation seen.  Left Atrium: Normal left atrium.  No left atrial or left  atrial appendage thrombus.  Left Ventricle: Mild segmental left ventricular systolic  dysfunction.  Hypokinesis of the basal to mid inferior  wall.  Right Heart: Normal right atrium. Right ventricular  enlargement with decreased right ventricular systolic  function. Normal tricuspid valve.  No vegetations seen in  association with the tricuspid valve.  Moderate-severe  tricuspid regurgitation. Normal pulmonic valve.  No  vegetations seen in association with the pulmonic valve.  Mild pulmonic regurgitation.  Pericardium/PleuraNormal pericardium with no pericardial  effusion.  Hemodynamic: Agitated saline injection and color flow  Doppler demonstrate evidence of a patent foramen ovale.  ------------------------------------------------------------------------  CONCLUSIONS:  1. A large (about  1.3 cm X 0.3 cm),  mobile echodensity is  visualized attached to the atrial aspect of the medial (P3)  scallop of the posterior mitral valve leaflet and is  consistent with a vegetation.  In addition, a portion of  the anterior mitral leaflet is markedly thickened and this  may represent sessile vegetation. Moderate mitral  regurgitation.  Vena contracta width about  0.4 - 0.5 cm.  2. Calcified trileaflet aortic valve with normal opening.  No vegetations seen in association with the aortic valve.  No aortic valve regurgitation seen.  3. Normal aortic root.  Mild non-mobile atherosclerotic  plaque in the aortic arch.  Severe, non-mobile  atherosclerotic plaque in the descending thoracic aorta.  4. Normal left atrium.  No left atrial or left atrial  appendage thrombus.  5. Mild segmental left ventricular systolic dysfunction.  Hypokinesis of the basal to mid inferior wall.  6. Right ventricular enlargement with decreased right  ventricular systolic function.  7. Normal tricuspid valve.  No vegetations seen in  association with the tricuspid valve.  Moderate-severe  tricuspid regurgitation.  8. Agitated saline injection and color flow Doppler  demonstrate evidence of a patent foramen ovale.    < end of copied text >

## 2021-07-20 NOTE — CHART NOTE - NSCHARTNOTEFT_GEN_A_CORE
: Valencia Sanchez    INDICATION: RLE swelling, induration.     PROCEDURE:  [ ] LIMITED ECHO  [x ] LIMITED CHEST  [ ] LIMITED RETROPERITONEAL  [ ] LIMITED ABDOMINAL  [x ] LIMITED DVT  [ ] NEEDLE GUIDANCE VASCULAR  [ ] NEEDLE GUIDANCE THORACENTESIS  [ ] NEEDLE GUIDANCE PARACENTESIS  [ ] NEEDLE GUIDANCE PERICARDIOCENTESIS  [ ] OTHER    FINDINGS:  Chest: Scattered B lines bilaterally with intermittent A lines  DVT study of LLE: No DVT observed on two region exam (common femoral and popliteal), compressible veins upon pressure     INTERPRETATION:  No gross LLE DVT on exam     Images stored on [employer].    Valencia Sanchez, PGY-2

## 2021-07-20 NOTE — PROGRESS NOTE ADULT - ASSESSMENT
71 yo M with PMhx of CAD s/p CABG (course at that time complicated by pleural effusion requiring chest tubes), ESRD on HD (TTS), DMII, HTN, afib on coumadin, and anemia presented from [facility] for evaluation of chest pain and GIB. Renal following for ESRD Mx. s/p cardiac arrest. CTH showed acute infarcts. Further w/u, MRSA bacteremia, TRUMAN showed MR vegetation.     ESRD  Maintenance schedule TTS  K, vol acceptable  Tolerating HD today w/2k bath, uf 0.5kg, as tolerated by bp  Electrolytes within normal limits.   Improved hemodynamics, pressor titration as per ICU team. on Levophed wean as tolerated.   On diltiazem for afib rate control.   On vanc, dosed by levels, for MRSA bacteremia and endocarditis.   dose all meds for ESRD    Anemia in CKD+GIB- Hb <goal  c/w Epo 20k tiw with HD  monitor h/h, neg FOBT  GI following, No plans for cscope.

## 2021-07-20 NOTE — PROGRESS NOTE ADULT - ASSESSMENT
70 year old with ESRD, DM, CAD presented with anemia and chest pain    Recently diagnosed Staph aureus bacteremia  Course complicated by arrest, imaging with ? CVA    1)Staph bacteremia- ? phlebitis as focus  -prelim results suggested MSSA  now identified as MRSA-     Dose Vancomycin based on daily level     Repeat blood cultures 7/9 with growth in 2 of 4 bottles  Repeat blood culture 7/11 and 7/12 without growth    TRUMAN with MV endocarditis    Consider imaging LS spine when more stable- had c/p back pain   May also want to rule out CNS mycotic anuerysm     LUE HD access appears to be a fistula but history states graft- consider US  to confirm    2) Fever  Likely due to bacteremia  Monitor    3) Pleural effusion- chronic - drained in 6/2020; repeat thoracentesis this admission    S/p thoracentesis- appears transudative     4) Leukocytosis  continue to monitor    Plan dw ICU

## 2021-07-20 NOTE — PROGRESS NOTE ADULT - ASSESSMENT
Assessment and Plan: 	  71 yo M with PMhx of CAD s/p CABG (course at that time complicated by pleural effusion requiring chest tubes), ESRD on HD (MWF), DMII, HTN, afib on coumadin, and anemia transferred from [facility] for evaluation of chest pain after HD and GIB s/p 2U PRBC transfusion and aflutter/afib with RVR managed with metoprolol. Admitted to MICU for hypotension dependent on pressors likely 2/2 to sepsis. In MICU, patient had a cardiac arrest on 7/13 requiring CPR, 3x epi, and 2x shock (for VTach).    #Neuro  - Off of sedation. Patient opens eyes on command and moves his toes. He is not able move his fingers or   - CT head shows foci of low density in the R anterolateral frontal lobe and L posterior temporal lobe concerning for acute infarcts.  - Mechanism likely cardioembolic in setting of patient with known atrial fibrillation with cardiac arrest vs septic emboli  - per neurology, can consider MRI brain w/o contrast. However, unlikely to  since patient has known atrial fibrillation and will need to be continued of full dose anticoagulation.  - Avoid hypotension, keep -180 for neuroprotection  - c/w Atorvastatin 40MG QHS for secondary stroke prevention.    #Cardiovascular  - Cardiac arrest: Patient had asystole night of 7/13 and CPR was started. S/p epi x3 and shock x2 with return to NSR. Cards is following  - TTE on 7/13 showed Mild mitral regurgitation with biventricular dysfunction of LV and RV systolic function. Endocardium was not well visualized, Hypokinesis of the basal inferior wall and basal inferoseptum.  - TRUMAN on 7/19 showed a large echodensity consisted with endocarditis and PFO  - Awaiting cardiac recs. Cardiology will speak to family regarding TRUMAN results and next steps.  - Regarding risk stratification, patient has a 1.3cm vegetation with suspected emboli. However, patient has cleared cultures at this time. Furthermore, patient has a history of CABG and surgery for endocarditis would be risky. Will discuss with cardiology and possibly CT team.  - c/w Levophed wean as tolerated.   - BP goal 140-180 to maintain cerebral perfusion after stroke  - Will consider switching to midodrine once HR stable  - Troponins 2400, CKMB 7.1. Troponin stable from prior to cardiac arrest. They are likely 2/2 chronic condition/ESRD on HD vs Hypotensive event. Will continue to trend and monitor.    A. Fib  - on coumadin at home,  - c/ heparin gtt prophylaxis. PTT improved, patient on heparin. Restarted hepatin after TRUMAN  - From neurology stand point, heparin is sufficient for now. Will need to be switched to DOAC in the future if kidney function allows  - 7/11: tried to transition heparin gtt to eliquis but pt is not a candidate due to his acute MIGUEL; back on heparin gtt  - c/ diltiazem for rate control    #Respiratory  - Loculated Pleural effusions: chest tube removed  - pleural fluid cx: (-); transudative.  - intubated and on ventilator (24/400/5/40). F/u ABG and adjust as necessary  - Patient O2 sat stable on CPAP today (10/5)  - CXR s/p chest tube removal showed no evidence of atelectasis. Demonstrated loculated left pleural effusion decreased in size and new hazy right lower lung opacity which could be due to a small layering right pleural effusion with associated passive atelectasis, atelectasis of other cause, and/or pneumonia.  - abx as below    #GI/Nutrition:  - NPO with tube feed. c/w feeds  - Was c/o abdominal pain on presentation, bedside POCUS didn't show ascites. No abdominal pain at this time. Continue to monitor.   - negative occult bleeding, hgb stable  - Per GI, patient is stable to get TRUMAN and does not need endoscopic evaluation prior to TRUMAN.   - Zofran PRN for Nausea.  - Patient hgb decreased to 6.8 O/N and patient required 1u pRBC. Will obtain FOBT and f/u GI recs    #/Renal  - ESRD: HD on MWF. Most recent Scr 4.0. Potassium is stable.   - Last dialysis session 7/17. Per nephro, will received dialysis today 7/20.  - Electrolytes stable  - c/w Epo 20k tiw with HD      #Skin  - HD Access: fistula in left upper arm  - Improving erythematous and tender on right wrist. Continue to monitor. RUE US neg for thrombus  - US of fistula showed patent fistula with no abscess  - Arrow is patent in right upper extremity.    #ID  - S aureus PCR: (+),  MRSA PCR negative  - BCx on 7/7 now growing MRSA: restarted on vanc (7/11- )  - Vanc 7/19 was 23. Vanco level today and dose after dialysis. Will speak to pharmacy regarding  - Surveillance blood Cx have been negative for 48hours, no longer need to continue    #Endocrine  - DMII: c/w Lispro SS. Monitor glucose.      #Hematologic/DVT ppx  - monitor PTT  - Will hold heparin in setting of recent decrease in hgb  - Patient complaining of right thigh pain. On exam, the thigh is tender. Will obtain CT scan to evaluate further to r/o extravasation. No signs of compartment syndrome right now  - PRN dilaudid for pain control  - Right lower extremity pulse check q1  - monitor for signs for compartment syndrome    #Ethics  - full code as per wife- full code as per wife

## 2021-07-20 NOTE — PROGRESS NOTE ADULT - SUBJECTIVE AND OBJECTIVE BOX
Follow Up:      Inverval History/ROS:Patient is a 70y old  Male who presents with a chief complaint of Chest pain and GIB (20 Jul 2021 14:00)    Intubated  More alert      Allergies    lisinopril (Other)  opioid-like analgesics (Other)    Intolerances        ANTIMICROBIALS:      OTHER MEDS:  acetaminophen   Tablet .. 1000 milliGRAM(s) Oral every 6 hours PRN  atorvastatin 40 milliGRAM(s) Oral at bedtime  calamine/zinc oxide Lotion 1 Application(s) Topical three times a day PRN  chlorhexidine 0.12% Liquid 15 milliLiter(s) Oral Mucosa every 12 hours  chlorhexidine 2% Cloths 1 Application(s) Topical daily  dextrose 40% Gel 15 Gram(s) Oral once  dextrose 40% Gel 15 Gram(s) Oral once  dextrose 5%. 1000 milliLiter(s) IV Continuous <Continuous>  dextrose 5%. 1000 milliLiter(s) IV Continuous <Continuous>  dextrose 50% Injectable 25 Gram(s) IV Push once  dextrose 50% Injectable 12.5 Gram(s) IV Push once  dextrose 50% Injectable 25 Gram(s) IV Push once  diltiazem    Tablet 30 milliGRAM(s) Oral four times a day  epoetin danilo-epbx (RETACRIT) Injectable 51735 Unit(s) IV Push <User Schedule>  glucagon  Injectable 1 milliGRAM(s) IntraMuscular once  insulin lispro (ADMELOG) corrective regimen sliding scale   SubCutaneous every 6 hours  norepinephrine Infusion 0.05 MICROgram(s)/kG/Min IV Continuous <Continuous>  pantoprazole  Injectable 40 milliGRAM(s) IV Push every 12 hours      Vital Signs Last 24 Hrs  T(C): 36.7 (20 Jul 2021 12:00), Max: 37.3 (19 Jul 2021 16:00)  T(F): 98 (20 Jul 2021 12:00), Max: 99.1 (19 Jul 2021 16:00)  HR: 69 (20 Jul 2021 14:11) (62 - 84)  BP: 121/41 (20 Jul 2021 14:00) (84/28 - 137/33)  BP(mean): 61 (20 Jul 2021 14:00) (33 - 98)  RR: 14 (20 Jul 2021 14:00) (12 - 29)  SpO2: 100% (20 Jul 2021 14:11) (97% - 100%)    PHYSICAL EXAM:  General: [x ] intubated  HEAD/EYES: [ ] PERRL [x ] white sclera [ ] icterus  ENT:  [ ] normal [ x] supple [ ] thrush [ ] pharyngeal exudate  Cardiovascular:   [ ] murmur [ x] normal [ ] PPM/AICD  Respiratory:  [ x] clear to ausculation bilaterally  GI:  [ ] soft, non-tender, normal bowel sounds  :  [ ] wagner [x ] no CVA tenderness   Musculoskeletal:  [ ] no synovitis  Neurologic:  [ ] non-focal exam   Skin:  [x ] no rash  Lymph: [ ] no lymphadenopathy  Psychiatric:  [ ] appropriate affect [ x] alert & oriented  Lines:  [x ] no phlebitis [ ] central line                                7.6    13.29 )-----------( 223      ( 20 Jul 2021 03:11 )             25.9       07-20    133<L>  |  92<L>  |  47<H>  ----------------------------<  129<H>  4.5   |  23  |  6.78<H>    Ca    8.5      20 Jul 2021 03:11  Phos  4.7     07-20  Mg     2.10     07-20    TPro  5.2<L>  /  Alb  2.3<L>  /  TBili  1.0  /  DBili  x   /  AST  26  /  ALT  6   /  AlkPhos  94  07-20          MICROBIOLOGY:    RADIOLOGY:  < from: TRUMAN w/o TTE (w/3D Echo) (07.19.21 @ 09:02) >  CONCLUSIONS:  1. A large (about  1.3 cm X 0.3 cm),  mobile echodensity is  visualized attached to the atrial aspect of the medial (P3)  scallop of the posterior mitral valve leaflet and is  consistent with a vegetation.  In addition, a portion of  the anterior mitral leaflet is markedly thickened and this  may represent sessile vegetation. Moderate mitral  regurgitation.  Vena contracta width about  0.4 - 0.5 cm.  2. Calcified trileaflet aortic valve with normal opening.  No vegetations seen in association with the aortic valve.  No aortic valve regurgitation seen.  3. Normal aortic root.  Mild non-mobile atherosclerotic  plaque in the aortic arch.  Severe, non-mobile  atherosclerotic plaque in the descending thoracic aorta.  4. Normal left atrium.  No left atrial or left atrial  appendage thrombus.  5. Mild segmental left ventricular systolic dysfunction.  Hypokinesis of the basal to mid inferior wall.  6. Right ventricular enlargement with decreased right  ventricular systolic function.  7. Normal tricuspid valve.  No vegetations seen in  association with the tricuspid valve.  Moderate-severe  tricuspid regurgitation.  8. Agitated saline injection and color flow Doppler  demonstrate evidence of a patent foramen ovale.    < end of copied text >

## 2021-07-20 NOTE — PROGRESS NOTE ADULT - ASSESSMENT
70 year old male with Anemia    Anemia  negative occult, 7/7 without overt gi bleeding  Monitor CBC and Keep hemoglobin > 8 given cardiac issues   PPI BID w/Maalox PRN    Monitor stool color  No GI objection to ASA or Heparin gtt while continuing PPI therapy    Constipation   Senna qhs and titrate Miralax qd to bid as needed   Suppository vs Enema as needed   Please monitor stool color    Ascites  2/2 RHF w/dilated IVC on CT   diuresis per cardiology     CVA, Embolic   care per neurology and micu appreciated   TRUMAN with Large Vegetation; care per primary team    Cardiac Arrest  Care per cardiology and MICU appreciated     Advanced care planning was discussed with patient.  Advanced care planning forms were reviewed and discussed.  Risks, benefits and alternatives of gastroenterologic procedures were discussed in detail and all questions were answered.  30 minutes spent.

## 2021-07-20 NOTE — PROGRESS NOTE ADULT - SUBJECTIVE AND OBJECTIVE BOX
INTERVAL HPI/OVERNIGHT EVENTS:    seen in micu   agitated/restless at time of visit   hgb noted, no rectal bleeding reported per team    MEDICATIONS  (STANDING):  atorvastatin 40 milliGRAM(s) Oral at bedtime  chlorhexidine 0.12% Liquid 15 milliLiter(s) Oral Mucosa every 12 hours  chlorhexidine 2% Cloths 1 Application(s) Topical daily  dextrose 40% Gel 15 Gram(s) Oral once  dextrose 40% Gel 15 Gram(s) Oral once  dextrose 5%. 1000 milliLiter(s) (50 mL/Hr) IV Continuous <Continuous>  dextrose 5%. 1000 milliLiter(s) (100 mL/Hr) IV Continuous <Continuous>  dextrose 50% Injectable 25 Gram(s) IV Push once  dextrose 50% Injectable 12.5 Gram(s) IV Push once  dextrose 50% Injectable 25 Gram(s) IV Push once  diltiazem    Tablet 30 milliGRAM(s) Oral four times a day  epoetin danilo-epbx (RETACRIT) Injectable 05325 Unit(s) IV Push <User Schedule>  glucagon  Injectable 1 milliGRAM(s) IntraMuscular once  insulin lispro (ADMELOG) corrective regimen sliding scale   SubCutaneous every 6 hours  norepinephrine Infusion 0.05 MICROgram(s)/kG/Min (4.04 mL/Hr) IV Continuous <Continuous>  pantoprazole  Injectable 40 milliGRAM(s) IV Push every 12 hours    MEDICATIONS  (PRN):  acetaminophen   Tablet .. 1000 milliGRAM(s) Oral every 6 hours PRN Temp greater or equal to 38C (100.4F), Mild Pain (1 - 3), Moderate Pain (4 - 6)  calamine/zinc oxide Lotion 1 Application(s) Topical three times a day PRN Itching      Allergies    lisinopril (Other)  opioid-like analgesics (Other)    Intolerances        Review of Systems: *confused      Vital Signs Last 24 Hrs  T(C): 36.7 (20 Jul 2021 12:00), Max: 37.3 (19 Jul 2021 16:00)  T(F): 98 (20 Jul 2021 12:00), Max: 99.1 (19 Jul 2021 16:00)  HR: 75 (20 Jul 2021 13:30) (62 - 84)  BP: 128/73 (20 Jul 2021 13:30) (84/28 - 137/33)  BP(mean): 84 (20 Jul 2021 13:30) (33 - 98)  RR: 29 (20 Jul 2021 13:30) (12 - 29)  SpO2: 100% (20 Jul 2021 13:30) (97% - 100%)    PHYSICAL EXAM:    Constitutional: NAD  HEENT: EOMI, throat clear  Neck: No LAD, supple  Respiratory: CTA and P  Cardiovascular: S1 and S2, RRR, no M  Gastrointestinal: BS+, soft, NT/ND, neg HSM,  Extremities: No peripheral edema, neg clubbing, cyanosis  Vascular: 2+ peripheral pulses  Neurological: A/O x 1  Psychiatric: confused/restless  Skin: No rashes      LABS:                        7.6    13.29 )-----------( 223      ( 20 Jul 2021 03:11 )             25.9     07-20    133<L>  |  92<L>  |  47<H>  ----------------------------<  129<H>  4.5   |  23  |  6.78<H>    Ca    8.5      20 Jul 2021 03:11  Phos  4.7     07-20  Mg     2.10     07-20    TPro  5.2<L>  /  Alb  2.3<L>  /  TBili  1.0  /  DBili  x   /  AST  26  /  ALT  6   /  AlkPhos  94  07-20    PT/INR - ( 19 Jul 2021 05:30 )   PT: 16.6 sec;   INR: 1.48 ratio         PTT - ( 20 Jul 2021 10:29 )  PTT:64.6 sec      RADIOLOGY & ADDITIONAL TESTS:

## 2021-07-20 NOTE — PROGRESS NOTE ADULT - ATTENDING COMMENTS
70 year old man with s/p CABG, ESRD on HD, known L pleural effusion, a-fib presented with chest pain and concern for GIB, course complicated by septic shock due to MRSA bacteremia and cardiac arrest     - patient is awake able to follow commands  - TRUMAN showed mitral valve vegetation   - required 1 unit PRBC overnight  - right thigh tender and swollen, heparin off will get imaging to assess for possible bleed into thight  - bacteremia seems to have resolved  - SBT

## 2021-07-20 NOTE — PROGRESS NOTE ADULT - ASSESSMENT
71 yo M with PMhx of CAD s/p CABG (course at that time complicated by pleural effusion requiring chest tubes), ESRD on HD (MWF), DMII, HTN, afib on coumadin, and anemia presented from [facility] for evaluation of chest pain and GIB.       left sided pleural effusion: loculated and chronic:   CAD:  CABG:  ESRD:   DM:  HTN:  A fibrillation:   GI Bleed    7/7:    left sided pleural effusion: loculated and chronic: he has chr loculated pleural effusion on left side: He had chest tube placement in last June 2020 at another hospital: no chemistry is available but he had negative cytology at that time: This time the effusion seems slightly worse and has loculated effusion with pleural thickening: heis not SOB andhasbeen on roomair: I think , it at all, if this effusion needs to be dealt with : it is probably vats: will contact thoracic surgery : etiology not very clear as there is no previous chemistry: coult be exudative or transudatve: he is on HD and now it is a chr christiano effusion   CAD: cont per cards  CABG: cont current meds  ESRD: on HD   DM: monitor and control  HTN: controlled  A fibrillation: off ac:   GI Bleed: per gi :  DW pt and t eam1    7/8:    left sided pleural effusion:: fever: s/p left sided chest tube placement: cultures sent: however with pr criteria it seems transudative await serum LDH but pleural fluid LDH is low:  already started on broad spectrum antibiotics ID to see: await cultures:   CAD: cont per cards  CABG: cont current meds  ESRD: on HD   DM: monitor and control  HTN: controlled  A fibrillation: off ac:   GI Bleed: per gi :  DW pmd    7/9:    left sided pleural effusion:: fever: s/p left sided chest tube placement: cultures sent: however with pr criteria it seems borderline transudative and transudative by LDH criteria: the vulutres ahve been negative so far:  already started on broad spectrum antibiotics ID to see: await cultures: ? source of spesis  CAD: cont per cards  CABG: cont current meds  ESRD: on HD   DM: monitor and control  HTN: in shock: now on vasopressors:   A fibrillation: off ac:   GI Bleed: per gi :  DW pmd  micu team    7/11:      left sided pleural effusion:: fever: s/p left sided chest tube placement: cultures sent: however with pr criteria it seems borderline transudative and transudative by LDH criteria: the cultures have been negative so far:  already started on broad spectrum antibiotics : Has MSSA bactermia  CAD: cont per cards: still on vasopressors: wean as tolerated ? needs molly ro tule out IE : would defer to cards:   CABG: cont current meds  ESRD: on HD   DM: monitor and control  HTN: in shock: now on vasopressors:   A fibrillation: off ac:   GI Bleed: per gi :  DW pmd  micu team    7/12:    left sided pleural effusion:: fever: s/p left sided chest tube placement:- now removed: cultures sent: however with pr criteria it seems borderline transudative and transudative by LDH criteria: the cultures have been negative so far:  already started on broad spectrum antibiotics : Has MRSA bactermia: vanco restarted yesterday ? echo/molly  Ac hypercarbic resp fialure: needs to be watched closelyin MICU : needs bipap may need intubation" At this time he is alert and awake: and responds to simple questions:    CAD: cont per cards: still on vasopressors: wean as tolerated ? needs molly ro tule out IE : would defer to cards:   CABG: cont current meds  ESRD: on HD   DM: monitor and control  HTN: in shock: now on vasopressors:   A fibrillation: off ac:   GI Bleed: per gi :  DW pmd  cards and bedstaff    7/13:  left sided pleural effusion:: fever: s/p left sided chest tube placement:- now removed: cultures sent: however with pr criteria it seems borderline transudative and transudative by LDH criteria: the cultures have been negative so far:  already started on broad spectrum antibiotics : Has MRSA bacteremia vanco restarted yesterday ? echo/molly  Ac hypercarbic resp fialure: now intubated s/p acls protocol last night   CAD: cont per cards: still on vasopressors: wean as tolerated ? needs molly ro tule out IE : would defer to cards:   CABG: cont current meds  ESRD: on HD   DM: monitor and control  HTN: in shock: now on vasopressors:   A fibrillation: off ac:   GI Bleed: per gi :  LINDA  staff  pt is critically ill now:     7/14:  left sided pleural effusion:: fever: s/p left sided chest tube placement:- now removed: cultures sent: however with pr criteria it seems borderline transudative and transudative by LDH criteria: the cultures have been negative so far:  already started on broad spectrum antibiotics : Has MRSA bacteremia vanco restarted yesterday now for  echo/molly  Ac hypercarbic resp fialure: now intubated s/p acls protocol last night -s/p cardiac arrest: ROSC 10 mts:   CAD: cont per cards: still on vasopressors: wean as tolerated ? needs molly ro tule out IE : would defer to cards: Cotn antbitioc  CABG: cont current meds  ESRD: on HD   DM: monitor and control  HTN: in shock: now on vasopressors:   A fibrillation: off ac:   GI Bleed: per gi :  DW  staff  pt is critically ill now: dw cards    7/15:    left sided pleural effusion:: transudative pl effusion with no with: certainly not the source for MRSA in blood:   Ac hypercarbic resp fialure: now intubated s/p acls protocol last night -s/p cardiac arrest: ROSC 10 mts:   CAD: cont per cards: still on vasopressors: wean as tolerated ? needs molly ro tule out IE : would defer to cards: Cotn antbitioc  CABG: cont current meds  ESRD: on HD   DM: monitor and control  HTN: in shock: now on vasopressors:   A fibrillation: on ac:   GI Bleed: per gi :  DW  staff  pt is critically ill now:     7/16"  left sided pleural effusion:: transudative pl effusion with no with: certainly not the source for MRSA in blood: ?source for MRSA:   Ac hypercarbic resp fialure: now intubated s/p acls protocol last night -s/p cardiac arrest: ROSC 10 mts: he seems to respond off sedation:per MICU note:   CAD: cont per cards: still on vasopressors: wean as tolerated ? needs molly ro tule out IE : would defer to cards: Cotn antbitioc  CABG: cont current meds  ESRD: on HD   DM: monitor and control  HTN: in shock: now on vasopressors:   A fibrillation: on ac:   GI Bleed: per gi :  DW  staff: PMD  pt is critically ill now:      7/17:    left sided pleural effusion:: transudative pl effusion with no with: certainly not the source for MRSA in blood: ?source for MRSA: for molly on monday  Ac hypercarbic resp fialure: now intubated s/p acls protocol last night -s/p cardiac arrest: ROSC 10 mts:   CAD: cont per cards: still on vasopressors: wean as tolerated ? needs molly ro tule out IE : would defer to cards: Cont antibiotics:   CABG: cont current meds  ESRD: on HD   DM: monitor and control  HTN: in shock: now on vasopressors:   A fibrillation: on ac:   GI Bleed: per gi :  DW  staff: PMD  pt is critically ill now:    7/18:    left sided pleural effusion:: transudative pl effusion with no with: certainly not the source for MRSA in blood: ?source for MRSA: for molly on monday: he is off sedation   Ac hypercarbic resp fialure: now intubated s/p acls protocol last night -s/p cardiac arrest: ROSC 10 mts:   Acute cva: noted on ct scan head: neurology following: already on heparin   CAD: cont per cards: still on vasopressors: wean as tolerated ? needs molly ro tule out IE : would defer to cards: Cont antibiotics:   CABG: cont current meds  ESRD: on HD   DM: monitor and control  HTN: in shock: now on vasopressors:   A fibrillation: on ac:   GI Bleed: per gi :  pt is critically ill now:    7/19:      left sided pleural effusion:: transudative pl effusion with no with: certainly not the source for MRSA in blood: ?source for MRSA: for molly today: he is off sedation but still on pressors:   Ac hypercarbic resp fialure: now intubated s/p acls protocol last night -s/p cardiac arrest: ROSC 10 mts:   Acute cva: noted on ct scan head: neurology following: already on heparin   CAD: cont per cards: still on vasopressors: wean as tolerated ? needs molly ro tule out IE : would defer to cards: Cont antibiotics: for molly today  CABG: cont current meds  ESRD: on HD   DM: monitor and control  HTN: in shock: now on vasopressors:   A fibrillation: on ac:   GI Bleed: per gi :  pt is critically ill now:    7/20  Acute Hypercarbic Respiratory Failure  -S/p RRT, tx to MICU 7/8, intubated 7/13 s/p cardiac arrest w/ ROSC  -CPAP trials, ventilator management per MICU team  -Pulmonary toileting, suction PRN   L pleural effusion   -Loculated L pleural effusion on CT chest, s/p CT placement by thoracic 7/8  -Transudative effusion, Cx negative   -CT since removed   -Repeat CT chest with small b/l partially loculated effusions, adjacent atelectasis  Bacteremia  -BC 7/9 MRSA+  -ABX per ID  -Repeat BC NGTD   -F/u cardiology recs regarding MOLLY   CAD (s/p CABG) - per cards   ESRD - HD per renal   GI bleed - GI recs noted

## 2021-07-20 NOTE — PROGRESS NOTE ADULT - SUBJECTIVE AND OBJECTIVE BOX
Nephrology Followup Note - [phone] - Dr Mendiola / Dr Knight / Dr Macias / Dr Arroyo / Dr Mackey / Dr Bowen / Dr Esposito / Dr Calle  Pt seen and examined during hD at bedside  No acute events overnight. Pt remains intubated, on pressor support.     Allergies:  lisinopril (Other)  opioid-like analgesics (Other)    Hospital Medications:   MEDICATIONS  (STANDING):  atorvastatin 40 milliGRAM(s) Oral at bedtime  chlorhexidine 0.12% Liquid 15 milliLiter(s) Oral Mucosa every 12 hours  chlorhexidine 2% Cloths 1 Application(s) Topical daily  dextrose 40% Gel 15 Gram(s) Oral once  dextrose 40% Gel 15 Gram(s) Oral once  dextrose 5%. 1000 milliLiter(s) (50 mL/Hr) IV Continuous <Continuous>  dextrose 5%. 1000 milliLiter(s) (100 mL/Hr) IV Continuous <Continuous>  dextrose 50% Injectable 25 Gram(s) IV Push once  dextrose 50% Injectable 12.5 Gram(s) IV Push once  dextrose 50% Injectable 25 Gram(s) IV Push once  diltiazem    Tablet 30 milliGRAM(s) Oral four times a day  epoetin danilo-epbx (RETACRIT) Injectable 23644 Unit(s) IV Push <User Schedule>  glucagon  Injectable 1 milliGRAM(s) IntraMuscular once  insulin lispro (ADMELOG) corrective regimen sliding scale   SubCutaneous every 6 hours  norepinephrine Infusion 0.05 MICROgram(s)/kG/Min (4.04 mL/Hr) IV Continuous <Continuous>  pantoprazole  Injectable 40 milliGRAM(s) IV Push every 12 hours    VITALS:  T(F): 98 (07-20-21 @ 12:00), Max: 99.1 (07-19-21 @ 16:00)  HR: 69 (07-20-21 @ 14:11)  BP: 121/41 (07-20-21 @ 14:00)  RR: 14 (07-20-21 @ 14:00)  SpO2: 100% (07-20-21 @ 14:11)  Wt(kg): --    07-19 @ 07:01  -  07-20 @ 07:00  --------------------------------------------------------  IN: 676 mL / OUT: 0 mL / NET: 676 mL    07-20 @ 07:01  -  07-20 @ 14:43  --------------------------------------------------------  IN: 124 mL / OUT: 0 mL / NET: 124 mL        PHYSICAL EXAM:  Constitutional: NAD  HEENT: ETT  Neck: No JVD  Respiratory: CTAB, no wheezes, rales or rhonchi  Cardiovascular: S1, S2, RRR  Gastrointestinal: BS+, soft, NT/ND  Extremities: No cyanosis or clubbing. +peripheral edema  Neurological: unresponsive   : No CVA tenderness. No wagner.   Skin: No rashes  Vascular Access: LUE AV access +thrill and bruit.     LABS:  07-20    133<L>  |  92<L>  |  47<H>  ----------------------------<  129<H>  4.5   |  23  |  6.78<H>    Ca    8.5      20 Jul 2021 03:11  Phos  4.7     07-20  Mg     2.10     07-20    TPro  5.2<L>  /  Alb  2.3<L>  /  TBili  1.0  /  DBili      /  AST  26  /  ALT  6   /  AlkPhos  94  07-20    Creatinine Trend: 6.78 <--, 5.79 <--, 5.45 <--, 4.43 <--, 6.22 <--, 4.99 <--, 6.40 <--, 5.18 <--                        7.6    13.29 )-----------( 223      ( 20 Jul 2021 03:11 )             25.9     Urine Studies:      RADIOLOGY & ADDITIONAL STUDIES:

## 2021-07-20 NOTE — PROGRESS NOTE ADULT - SUBJECTIVE AND OBJECTIVE BOX
EP ATTENDING    tele: AFib  prior PEA/Jay/Asystole arrest.  resuscitated and intubated  antibiotics for MRSA bacteremia.  CT head w/ interval hypodensities concerning for stroke.  waking up vent, not cooperative, trying to get out of bed.      Review of Systems:   Constitutional: [ ] fevers, [ ] chills.   Skin: [ ] dry skin. [ ] rashes.  Psychiatric: [ ] depression, [ ] anxiety.   Gastrointestinal: [ ] BRBPR, [ ] melena.   Neurological: [ ] confusion. [ ] seizures. [ ] shuffling gait.   Ears,Nose,Mouth and Throat: [ ] ear pain [ ] sore throat.   Eyes: [ ] diplopia.   Respiratory: [ ] hemoptysis. [ ] shortness of breath  Cardiovascular: See HPI above  Hematologic/Lymphatic: [ ] anemia. [ ] painful nodes. [ ] prolonged bleeding.   Genitourinary: [ ] hematuria. [ ] flank pain.   Endocrine: [ ] significant change in weight. [ ] intolerance to heat and cold.     Review of systems [x ] otherwise negative, [ ] otherwise unable to obtain    FH: no family history of sudden cardiac death in first degree relatives    SH: [ ] tobacco, [ ] alcohol, [ ] drugs    acetaminophen   Tablet .. 1000 milliGRAM(s) Oral every 6 hours PRN  atorvastatin 40 milliGRAM(s) Oral at bedtime  calamine/zinc oxide Lotion 1 Application(s) Topical three times a day PRN  chlorhexidine 0.12% Liquid 15 milliLiter(s) Oral Mucosa every 12 hours  chlorhexidine 2% Cloths 1 Application(s) Topical daily  dextrose 40% Gel 15 Gram(s) Oral once  dextrose 40% Gel 15 Gram(s) Oral once  dextrose 5%. 1000 milliLiter(s) IV Continuous <Continuous>  dextrose 5%. 1000 milliLiter(s) IV Continuous <Continuous>  dextrose 50% Injectable 25 Gram(s) IV Push once  dextrose 50% Injectable 12.5 Gram(s) IV Push once  dextrose 50% Injectable 25 Gram(s) IV Push once  diltiazem    Tablet 30 milliGRAM(s) Oral four times a day  epoetin danilo-epbx (RETACRIT) Injectable 18368 Unit(s) IV Push <User Schedule>  glucagon  Injectable 1 milliGRAM(s) IntraMuscular once  heparin   Injectable 7000 Unit(s) IV Push every 6 hours PRN  heparin   Injectable 3500 Unit(s) IV Push every 6 hours PRN  heparin  Infusion. 2500 Unit(s)/Hr IV Continuous <Continuous>  insulin lispro (ADMELOG) corrective regimen sliding scale   SubCutaneous every 6 hours  norepinephrine Infusion 0.05 MICROgram(s)/kG/Min IV Continuous <Continuous>  pantoprazole  Injectable 40 milliGRAM(s) IV Push every 12 hours                            7.6    13.29 )-----------( 223      ( 20 Jul 2021 03:11 )             25.9       07-20    133<L>  |  92<L>  |  47<H>  ----------------------------<  129<H>  4.5   |  23  |  6.78<H>    Ca    8.5      20 Jul 2021 03:11  Phos  4.7     07-20  Mg     2.10     07-20    TPro  5.2<L>  /  Alb  2.3<L>  /  TBili  1.0  /  DBili  x   /  AST  26  /  ALT  6   /  AlkPhos  94  07-20    T(C): 37.1 (07-20-21 @ 04:00), Max: 37.3 (07-19-21 @ 12:00)  HR: 82 (07-20-21 @ 06:29) (62 - 82)  BP: 126/50 (07-20-21 @ 06:00) (84/28 - 137/33)  RR: 25 (07-20-21 @ 06:00) (12 - 26)  SpO2: 100% (07-20-21 @ 06:29) (97% - 100%)  Wt(kg): --    I&O's Summary    19 Jul 2021 07:01  -  20 Jul 2021 07:00  --------------------------------------------------------  IN: 676 mL / OUT: 0 mL / NET: 676 mL    General: awake, confused/uncooperative  Head: Normocephalic and atraumatic.  right facial droop  Neck: No JVD. No bruits. Supple. Does not appear to be enlarged.  Left IJ CVL.  Intubated.    Cardiovascular: + S1,S2 ; IRR Soft systolic murmur at the left lower sternal border. No rubs noted.    Lungs: coarse breath sounds bL.   Abdomen: + BS, soft. Non distended.   Extremities: limbs warm, edematous  Psychiatric: unable to assess due to sedation  Musculoskeletal: spontaneously moves fingers but not legs, does not do hand- on command.      A/P) 71 y/o male PMH CAD s/p CABG, PAF/AFL on coumadin, HTN, hyperlipidemia, DM, ESRD on HD, hypothyroidism a/w sepsis    -continue diltiazem for AF rate control.  -heparin infusion for secondary stroke prevention  - s/p PEA/jay/asystole arrest, secondary to critical illness and infection.  no pacemaker or ICD indicated.  -no further inpatient EP workup needed    - will follow.    Pierce Nguyen M.D.  Cardiac Electrophysiology  [phone]

## 2021-07-21 LAB
ALBUMIN SERPL ELPH-MCNC: 2.3 G/DL — LOW (ref 3.3–5)
ALP SERPL-CCNC: 109 U/L — SIGNIFICANT CHANGE UP (ref 40–120)
ALT FLD-CCNC: 8 U/L — SIGNIFICANT CHANGE UP (ref 4–41)
ANION GAP SERPL CALC-SCNC: 18 MMOL/L — HIGH (ref 7–14)
APTT 50/50 2HOUR INCUB: SIGNIFICANT CHANGE UP SEC (ref 27.5–37.4)
APTT BLD: 42.1 SEC — HIGH (ref 27–36.3)
APTT BLD: SIGNIFICANT CHANGE UP SEC (ref 27.5–37.4)
AST SERPL-CCNC: 38 U/L — SIGNIFICANT CHANGE UP (ref 4–40)
BASOPHILS # BLD AUTO: 0.06 K/UL — SIGNIFICANT CHANGE UP (ref 0–0.2)
BASOPHILS # BLD AUTO: 0.07 K/UL — SIGNIFICANT CHANGE UP (ref 0–0.2)
BASOPHILS NFR BLD AUTO: 0.4 % — SIGNIFICANT CHANGE UP (ref 0–2)
BASOPHILS NFR BLD AUTO: 0.6 % — SIGNIFICANT CHANGE UP (ref 0–2)
BILIRUB SERPL-MCNC: 1.3 MG/DL — HIGH (ref 0.2–1.2)
BLD GP AB SCN SERPL QL: NEGATIVE — SIGNIFICANT CHANGE UP
BUN SERPL-MCNC: 32 MG/DL — HIGH (ref 7–23)
CALCIUM SERPL-MCNC: 8.5 MG/DL — SIGNIFICANT CHANGE UP (ref 8.4–10.5)
CHLORIDE SERPL-SCNC: 93 MMOL/L — LOW (ref 98–107)
CK SERPL-CCNC: 633 U/L — HIGH (ref 30–200)
CO2 SERPL-SCNC: 23 MMOL/L — SIGNIFICANT CHANGE UP (ref 22–31)
CREAT SERPL-MCNC: 4.75 MG/DL — HIGH (ref 0.5–1.3)
EOSINOPHIL # BLD AUTO: 0.02 K/UL — SIGNIFICANT CHANGE UP (ref 0–0.5)
EOSINOPHIL # BLD AUTO: 0.05 K/UL — SIGNIFICANT CHANGE UP (ref 0–0.5)
EOSINOPHIL NFR BLD AUTO: 0.1 % — SIGNIFICANT CHANGE UP (ref 0–6)
EOSINOPHIL NFR BLD AUTO: 0.4 % — SIGNIFICANT CHANGE UP (ref 0–6)
GLUCOSE BLDC GLUCOMTR-MCNC: 116 MG/DL — HIGH (ref 70–99)
GLUCOSE BLDC GLUCOMTR-MCNC: 118 MG/DL — HIGH (ref 70–99)
GLUCOSE BLDC GLUCOMTR-MCNC: 120 MG/DL — HIGH (ref 70–99)
GLUCOSE BLDC GLUCOMTR-MCNC: 130 MG/DL — HIGH (ref 70–99)
GLUCOSE SERPL-MCNC: 109 MG/DL — HIGH (ref 70–99)
HCT VFR BLD CALC: 24 % — LOW (ref 39–50)
HCT VFR BLD CALC: 25.1 % — LOW (ref 39–50)
HGB BLD-MCNC: 7.1 G/DL — LOW (ref 13–17)
HGB BLD-MCNC: 7.6 G/DL — LOW (ref 13–17)
IANC: 10.53 K/UL — HIGH (ref 1.5–8.5)
IANC: 12.41 K/UL — HIGH (ref 1.5–8.5)
IMM GRANULOCYTES NFR BLD AUTO: 1.2 % — SIGNIFICANT CHANGE UP (ref 0–1.5)
IMM GRANULOCYTES NFR BLD AUTO: 1.6 % — HIGH (ref 0–1.5)
INR BLD: 1.36 RATIO — HIGH (ref 0.88–1.16)
LYMPHOCYTES # BLD AUTO: 0.47 K/UL — LOW (ref 1–3.3)
LYMPHOCYTES # BLD AUTO: 0.49 K/UL — LOW (ref 1–3.3)
LYMPHOCYTES # BLD AUTO: 3.4 % — LOW (ref 13–44)
LYMPHOCYTES # BLD AUTO: 3.7 % — LOW (ref 13–44)
MAGNESIUM SERPL-MCNC: 2 MG/DL — SIGNIFICANT CHANGE UP (ref 1.6–2.6)
MCHC RBC-ENTMCNC: 28.2 PG — SIGNIFICANT CHANGE UP (ref 27–34)
MCHC RBC-ENTMCNC: 28.7 PG — SIGNIFICANT CHANGE UP (ref 27–34)
MCHC RBC-ENTMCNC: 29.6 GM/DL — LOW (ref 32–36)
MCHC RBC-ENTMCNC: 30.3 GM/DL — LOW (ref 32–36)
MCV RBC AUTO: 94.7 FL — SIGNIFICANT CHANGE UP (ref 80–100)
MCV RBC AUTO: 95.2 FL — SIGNIFICANT CHANGE UP (ref 80–100)
MONOCYTES # BLD AUTO: 1.27 K/UL — HIGH (ref 0–0.9)
MONOCYTES # BLD AUTO: 1.37 K/UL — HIGH (ref 0–0.9)
MONOCYTES NFR BLD AUTO: 10.1 % — SIGNIFICANT CHANGE UP (ref 2–14)
MONOCYTES NFR BLD AUTO: 9.4 % — SIGNIFICANT CHANGE UP (ref 2–14)
NEUTROPHILS # BLD AUTO: 10.53 K/UL — HIGH (ref 1.8–7.4)
NEUTROPHILS # BLD AUTO: 12.41 K/UL — HIGH (ref 1.8–7.4)
NEUTROPHILS NFR BLD AUTO: 84 % — HIGH (ref 43–77)
NEUTROPHILS NFR BLD AUTO: 85.1 % — HIGH (ref 43–77)
NRBC # BLD: 0 /100 WBCS — SIGNIFICANT CHANGE UP
NRBC # BLD: 0 /100 WBCS — SIGNIFICANT CHANGE UP
NRBC # FLD: 0 K/UL — SIGNIFICANT CHANGE UP
NRBC # FLD: 0 K/UL — SIGNIFICANT CHANGE UP
PHOSPHATE SERPL-MCNC: 4.5 MG/DL — SIGNIFICANT CHANGE UP (ref 2.5–4.5)
PLATELET # BLD AUTO: 252 K/UL — SIGNIFICANT CHANGE UP (ref 150–400)
PLATELET # BLD AUTO: 275 K/UL — SIGNIFICANT CHANGE UP (ref 150–400)
POTASSIUM SERPL-MCNC: 4.1 MMOL/L — SIGNIFICANT CHANGE UP (ref 3.5–5.3)
POTASSIUM SERPL-SCNC: 4.1 MMOL/L — SIGNIFICANT CHANGE UP (ref 3.5–5.3)
PROT SERPL-MCNC: 5.3 G/DL — LOW (ref 6–8.3)
PROTHROM AB SERPL-ACNC: 15.4 SEC — HIGH (ref 10.6–13.6)
PT 100%: 15.4 SEC — HIGH (ref 10.6–13.6)
PT 50/50: 12.6 SEC — SIGNIFICANT CHANGE UP (ref 10.6–13.6)
RBC # BLD: 2.52 M/UL — LOW (ref 4.2–5.8)
RBC # BLD: 2.65 M/UL — LOW (ref 4.2–5.8)
RBC # FLD: 19.7 % — HIGH (ref 10.3–14.5)
RBC # FLD: 20.1 % — HIGH (ref 10.3–14.5)
RH IG SCN BLD-IMP: POSITIVE — SIGNIFICANT CHANGE UP
SODIUM SERPL-SCNC: 134 MMOL/L — LOW (ref 135–145)
THROMBIN TIME: 31.9 SEC — HIGH (ref 16–26)
VANCOMYCIN FLD-MCNC: 23.1 UG/ML — SIGNIFICANT CHANGE UP
WBC # BLD: 12.54 K/UL — HIGH (ref 3.8–10.5)
WBC # BLD: 14.58 K/UL — HIGH (ref 3.8–10.5)
WBC # FLD AUTO: 12.54 K/UL — HIGH (ref 3.8–10.5)
WBC # FLD AUTO: 14.58 K/UL — HIGH (ref 3.8–10.5)

## 2021-07-21 PROCEDURE — 72132 CT LUMBAR SPINE W/DYE: CPT | Mod: 26

## 2021-07-21 PROCEDURE — 73706 CT ANGIO LWR EXTR W/O&W/DYE: CPT | Mod: 26,50,52

## 2021-07-21 PROCEDURE — 99232 SBSQ HOSP IP/OBS MODERATE 35: CPT

## 2021-07-21 RX ORDER — MIDAZOLAM HYDROCHLORIDE 1 MG/ML
4 INJECTION, SOLUTION INTRAMUSCULAR; INTRAVENOUS ONCE
Refills: 0 | Status: DISCONTINUED | OUTPATIENT
Start: 2021-07-21 | End: 2021-07-21

## 2021-07-21 RX ORDER — ACETAMINOPHEN 500 MG
1000 TABLET ORAL EVERY 6 HOURS
Refills: 0 | Status: DISCONTINUED | OUTPATIENT
Start: 2021-07-21 | End: 2021-08-09

## 2021-07-21 RX ORDER — MIDAZOLAM HYDROCHLORIDE 1 MG/ML
2 INJECTION, SOLUTION INTRAMUSCULAR; INTRAVENOUS ONCE
Refills: 0 | Status: DISCONTINUED | OUTPATIENT
Start: 2021-07-21 | End: 2021-07-21

## 2021-07-21 RX ADMIN — CHLORHEXIDINE GLUCONATE 15 MILLILITER(S): 213 SOLUTION TOPICAL at 17:35

## 2021-07-21 RX ADMIN — Medication 30 MILLIGRAM(S): at 05:52

## 2021-07-21 RX ADMIN — MIDAZOLAM HYDROCHLORIDE 2 MILLIGRAM(S): 1 INJECTION, SOLUTION INTRAMUSCULAR; INTRAVENOUS at 13:00

## 2021-07-21 RX ADMIN — Medication 30 MILLIGRAM(S): at 00:00

## 2021-07-21 RX ADMIN — Medication 1.62 MICROGRAM(S)/KG/MIN: at 23:41

## 2021-07-21 RX ADMIN — HYDROMORPHONE HYDROCHLORIDE 0.5 MILLIGRAM(S): 2 INJECTION INTRAMUSCULAR; INTRAVENOUS; SUBCUTANEOUS at 06:38

## 2021-07-21 RX ADMIN — HYDROMORPHONE HYDROCHLORIDE 0.5 MILLIGRAM(S): 2 INJECTION INTRAMUSCULAR; INTRAVENOUS; SUBCUTANEOUS at 06:54

## 2021-07-21 RX ADMIN — ATORVASTATIN CALCIUM 40 MILLIGRAM(S): 80 TABLET, FILM COATED ORAL at 23:41

## 2021-07-21 RX ADMIN — Medication 1000 MILLIGRAM(S): at 16:10

## 2021-07-21 RX ADMIN — PANTOPRAZOLE SODIUM 40 MILLIGRAM(S): 20 TABLET, DELAYED RELEASE ORAL at 17:37

## 2021-07-21 RX ADMIN — Medication 1.62 MICROGRAM(S)/KG/MIN: at 07:04

## 2021-07-21 RX ADMIN — Medication 30 MILLIGRAM(S): at 11:04

## 2021-07-21 RX ADMIN — PANTOPRAZOLE SODIUM 40 MILLIGRAM(S): 20 TABLET, DELAYED RELEASE ORAL at 05:52

## 2021-07-21 RX ADMIN — Medication 30 MILLIGRAM(S): at 17:36

## 2021-07-21 RX ADMIN — Medication 30 MILLIGRAM(S): at 23:41

## 2021-07-21 RX ADMIN — CHLORHEXIDINE GLUCONATE 15 MILLILITER(S): 213 SOLUTION TOPICAL at 05:52

## 2021-07-21 RX ADMIN — CHLORHEXIDINE GLUCONATE 1 APPLICATION(S): 213 SOLUTION TOPICAL at 11:05

## 2021-07-21 NOTE — PROGRESS NOTE ADULT - ASSESSMENT
71 yo M with PMhx of CAD s/p CABG (course at that time complicated by pleural effusion requiring chest tubes), ESRD on HD (TTS), DMII, HTN, afib on coumadin, and anemia presented from [facility] for evaluation of chest pain and GIB. Renal following for ESRD Mx. s/p cardiac arrest. CTH showed acute infarcts. Further w/u, MRSA bacteremia, TRUMAN showed MR vegetation.     ESRD  Maintenance schedule TTS  K, vol acceptable  Pt had HD yesterday, tolerated well, flowsheet reviewed. BP stable during treatment.  0.5kg UF achieved. Repeat HD tomorrow.   Electrolytes within normal limits.   Improved hemodynamics, pressor titration as per ICU team. on Levophed wean as tolerated.   On diltiazem for afib rate control.   On vanc, dosed by levels, for MRSA bacteremia and endocarditis.   dose all meds for ESRD    Anemia in CKD+GIB- Hb <goal  c/w Epo 20k tiw with HD  transfused prbc

## 2021-07-21 NOTE — PROCEDURE NOTE - NSPROCDETAILS_GEN_ALL_CORE
blood seen on insertion/dressing applied/flushes easily/secured in place
secured in place/sterile dressing applied/thoracostomy tube placed percutaneously/ultrasound assessment of fluid (location)
patient pre-oxygenated, tube inserted, placement confirmed
guidewire recovered/lumen(s) aspirated and flushed/sterile dressing applied/sterile technique, catheter placed/ultrasound guidance with use of sterile gel and probe cove

## 2021-07-21 NOTE — PROGRESS NOTE ADULT - ASSESSMENT
Assessment and Plan: 	  71 yo M with PMhx of CAD s/p CABG (course at that time complicated by pleural effusion requiring chest tubes), ESRD on HD (MWF), DMII, HTN, afib on coumadin, and anemia transferred from [facility] for evaluation of chest pain after HD and GIB s/p 2U PRBC transfusion and aflutter/afib with RVR managed with metoprolol. Admitted to MICU for hypotension dependent on pressors likely 2/2 to sepsis. In MICU, patient had a cardiac arrest on 7/13 requiring CPR, 3x epi, and 2x shock (for VTach).    #Neuro  - Off of sedation. Patient opens eyes on command and moves his toes. He is able to  his hands  - CT head shows foci of low density in the R anterolateral frontal lobe and L posterior temporal lobe concerning for acute infarcts.  - Mechanism likely cardioembolic in setting of patient with known atrial fibrillation with cardiac arrest vs septic emboli  - per neurology, can consider MRI brain w/o contrast. However, unlikely to  since patient has known atrial fibrillation and will need to be continued of full dose anticoagulation.  - Avoid hypotension, keep -180 for neuroprotection  - c/w Atorvastatin 40MG QHS for secondary stroke prevention.    #Cardiovascular  - Cardiac arrest: Patient had asystole night of 7/13 and CPR was started. S/p epi x3 and shock x2 with return to NSR. Cards is following  - TTE on 7/13 showed Mild mitral regurgitation with biventricular dysfunction of LV and RV systolic function. Endocardium was not well visualized, Hypokinesis of the basal inferior wall and basal inferoseptum.  - TRUMAN on 7/19 showed a large echodensity consisted with endocarditis and PFO  - Awaiting cardiac recs. Cardiology will speak to family regarding TRUMAN results and next steps.  - Cardiology spoke with CTS, awaiting recs (Dr. Herzog)  - c/w Levophed wean as tolerated.   - BP goal 140-180 to maintain cerebral perfusion after stroke  - Will consider switching to midodrine once HR stable  - Troponins 2400, CKMB 7.1. Troponin stable from prior to cardiac arrest. They are likely 2/2 chronic condition/ESRD on HD vs Hypotensive event. Stable, no need to trend    A. Fib  - on coumadin at home,  - c/ heparin gtt prophylaxis. PTT improved, patient on heparin. Restarted hepatin after TRUMAN  - From neurology stand point, heparin is sufficient for now. Will need to be switched to DOAC in the future if kidney function allows  - 7/11: tried to transition heparin gtt to eliquis but pt is not a candidate due to his acute MIGUEL; back on heparin gtt  - c/ diltiazem for rate control    #Respiratory  - Loculated Pleural effusions: chest tube removed  - pleural fluid cx: (-); transudative.  - intubated and on ventilator (24/400/5/40). F/u ABG and adjust as necessary  - Patient O2 sat stable on CPAP today (10/5)  - CXR s/p chest tube removal showed no evidence of atelectasis. Demonstrated loculated left pleural effusion decreased in size and new hazy right lower lung opacity which could be due to a small layering right pleural effusion with associated passive atelectasis, atelectasis of other cause, and/or pneumonia.  - abx as below    #GI/Nutrition:  - NPO with tube feed. c/w feeds  - Was c/o abdominal pain on presentation, bedside POCUS didn't show ascites. No abdominal pain at this time. Continue to monitor.   - negative occult bleeding, hgb stable  - Per GI, recent decrease in hgb less likely to be a GIB, appreciate recs  - FOBT negative  - Zofran PRN for Nausea.    #/Renal  - ESRD: HD on MWF. Most recent Scr 4.0. Potassium is stable.   - Last dialysis session 7/20. Per nephro, will received dialysis tomorrow  - Electrolytes stable  - c/w Epo 20k tiw with HD      #Skin  - HD Access: fistula in left upper arm  - Improving erythematous and tender on right wrist. Continue to monitor. RUE US neg for thrombus  - US of fistula showed patent fistula with no abscess  - Arrow is patent in right upper extremity.    #ID  - S aureus PCR: (+),  MRSA PCR negative  - BCx on 7/7 now growing MRSA: restarted on vanc (7/11- )  - Vanc 7/19 was 23.1. Will obtain level tomorrow. No dialysis today  - Surveillance blood Cx have been negative for 48hours, no longer need to continue    #Endocrine  - DMII: c/w Lispro SS. Monitor glucose.      #Hematologic/DVT ppx  - monitor PTT  - Will hold heparin in setting of recent decrease in hgb. Required another unit of blood o/n  - Patient complaining of right thigh pain. CTA demonstrated A moderate-sized hematoma in the right adductor muscle group.  - Contact surgery regarding the hematoma, awaiting recs  - PRN dilaudid for pain control    #Ethics  - full code as per wife- full code as per wife   Assessment and Plan: 	  71 yo M with PMhx of CAD s/p CABG (course at that time complicated by pleural effusion requiring chest tubes), ESRD on HD (MWF), DMII, HTN, afib on coumadin, and anemia transferred from [facility] for evaluation of chest pain after HD and GIB s/p 2U PRBC transfusion and aflutter/afib with RVR managed with metoprolol. Admitted to MICU for hypotension dependent on pressors likely 2/2 to sepsis. In MICU, patient had a cardiac arrest on 7/13 requiring CPR, 3x epi, and 2x shock (for VTach).    #Neuro  - Off of sedation. Patient opens eyes on command and moves his toes. He is able to  his hands  - CT head shows foci of low density in the R anterolateral frontal lobe and L posterior temporal lobe concerning for acute infarcts.  - Mechanism likely cardioembolic in setting of patient with known atrial fibrillation with cardiac arrest vs septic emboli  - per neurology, can consider MRI brain w/o contrast. However, unlikely to  since patient has known atrial fibrillation and will need to be continued of full dose anticoagulation.  - Avoid hypotension, keep -180 for neuroprotection  - c/w Atorvastatin 40MG QHS for secondary stroke prevention.    #Cardiovascular  - Cardiac arrest: Patient had asystole night of 7/13 and CPR was started. S/p epi x3 and shock x2 with return to NSR. Cards is following  - TTE on 7/13 showed Mild mitral regurgitation with biventricular dysfunction of LV and RV systolic function. Endocardium was not well visualized, Hypokinesis of the basal inferior wall and basal inferoseptum.  - TRUMAN on 7/19 showed a large echodensity consisted with endocarditis and PFO  - Awaiting cardiac recs. Cardiology will speak to family regarding TRUMAN results and next steps.  - Cardiology spoke with CTS, awaiting recs (Dr. Herzog)  - c/w Levophed wean as tolerated.   - BP goal 140-180 to maintain cerebral perfusion after stroke  - Will consider switching to midodrine once HR stable  - Troponins 2400, CKMB 7.1. Troponin stable from prior to cardiac arrest. They are likely 2/2 chronic condition/ESRD on HD vs Hypotensive event. Stable, no need to trend    A. Fib  - on coumadin at home,  - c/ heparin gtt prophylaxis. PTT improved, patient on heparin. Restarted hepatin after TRUMAN  - From neurology stand point, heparin is sufficient for now. Will need to be switched to DOAC in the future if kidney function allows  - 7/11: tried to transition heparin gtt to eliquis but pt is not a candidate due to his acute MIGUEL; back on heparin gtt  - c/ diltiazem for rate control    #Respiratory  - Loculated Pleural effusions: chest tube removed  - pleural fluid cx: (-); transudative.  - intubated and on ventilator (24/400/5/40). F/u ABG and adjust as necessary  - Patient O2 sat stable on CPAP today (10/5)  - CXR s/p chest tube removal showed no evidence of atelectasis. Demonstrated loculated left pleural effusion decreased in size and new hazy right lower lung opacity which could be due to a small layering right pleural effusion with associated passive atelectasis, atelectasis of other cause, and/or pneumonia.  - abx as below    #GI/Nutrition:  - NPO with tube feed. c/w feeds  - Was c/o abdominal pain on presentation, bedside POCUS didn't show ascites. No abdominal pain at this time. Continue to monitor.   - negative occult bleeding, hgb stable  - Per GI, recent decrease in hgb less likely to be a GIB, appreciate recs  - FOBT negative  - Zofran PRN for Nausea.    #/Renal  - ESRD: HD on MWF. Most recent Scr 4.0. Potassium is stable.   - Last dialysis session 7/20. Per nephro, will received dialysis tomorrow  - Electrolytes stable  - c/w Epo 20k tiw with HD      #Skin  - HD Access: fistula in left upper arm  - Improving erythematous and tender on right wrist. Continue to monitor. RUE US neg for thrombus  - US of fistula showed patent fistula with no abscess  - Arrow is patent in right upper extremity.    #ID  - S aureus PCR: (+),  MRSA PCR negative  - BCx on 7/7 now growing MRSA: restarted on vanc (7/11- )  - Vanc 7/19 was 23.1. Will obtain level tomorrow. No dialysis today  - Surveillance blood Cx have been negative for 48hours, no longer need to continue    #Endocrine  - DMII: c/w Lispro SS. Monitor glucose.      #Hematologic/DVT ppx  - monitor PTT  - Will hold heparin in setting of recent decrease in hgb. Required another unit of blood o/n  - Patient complaining of right thigh pain. CTA demonstrated A moderate-sized hematoma in the right adductor muscle group.  - Contact surgery regarding the hematoma, awaiting recs  - PRN dilaudid for pain control  - PTT still elevated off of hepatin, will send mixing study    #Ethics  - full code as per wife- full code as per wife

## 2021-07-21 NOTE — PROGRESS NOTE ADULT - SUBJECTIVE AND OBJECTIVE BOX
Date of Service: 07-21-21 @ 11:56    Patient is a 70y old  Male who presents with a chief complaint of Chest pain and GIB (21 Jul 2021 11:40)    Any change in ROS:   Awake & alert, tolerating CPAP trials 10/5   Pending CT scan per RN  No acute distress    MEDICATIONS  (STANDING):  atorvastatin 40 milliGRAM(s) Oral at bedtime  chlorhexidine 0.12% Liquid 15 milliLiter(s) Oral Mucosa every 12 hours  chlorhexidine 2% Cloths 1 Application(s) Topical daily  dextrose 40% Gel 15 Gram(s) Oral once  dextrose 40% Gel 15 Gram(s) Oral once  dextrose 5%. 1000 milliLiter(s) (50 mL/Hr) IV Continuous <Continuous>  dextrose 5%. 1000 milliLiter(s) (100 mL/Hr) IV Continuous <Continuous>  dextrose 50% Injectable 25 Gram(s) IV Push once  dextrose 50% Injectable 12.5 Gram(s) IV Push once  dextrose 50% Injectable 25 Gram(s) IV Push once  diltiazem    Tablet 30 milliGRAM(s) Oral four times a day  epoetin danilo-epbx (RETACRIT) Injectable 25202 Unit(s) IV Push <User Schedule>  glucagon  Injectable 1 milliGRAM(s) IntraMuscular once  insulin lispro (ADMELOG) corrective regimen sliding scale   SubCutaneous every 6 hours  midazolam Injectable 4 milliGRAM(s) IV Push once  norepinephrine Infusion 0.02 MICROgram(s)/kG/Min (1.62 mL/Hr) IV Continuous <Continuous>  pantoprazole  Injectable 40 milliGRAM(s) IV Push every 12 hours    MEDICATIONS  (PRN):  acetaminophen   Tablet .. 1000 milliGRAM(s) Oral every 6 hours PRN Temp greater or equal to 38C (100.4F), Mild Pain (1 - 3), Moderate Pain (4 - 6)  calamine/zinc oxide Lotion 1 Application(s) Topical three times a day PRN Itching    Vital Signs Last 24 Hrs  T(C): 37.4 (21 Jul 2021 08:00), Max: 37.7 (21 Jul 2021 04:00)  T(F): 99.4 (21 Jul 2021 08:00), Max: 99.8 (21 Jul 2021 04:00)  HR: 78 (21 Jul 2021 11:08) (65 - 86)  BP: 114/35 (21 Jul 2021 10:00) (77/48 - 136/30)  BP(mean): 53 (21 Jul 2021 10:00) (43 - 95)  RR: 18 (21 Jul 2021 10:00) (14 - 29)  SpO2: 97% (21 Jul 2021 11:08) (92% - 100%)  Mode: CPAP with PS  FiO2: 40  PEEP: 5  PS: 10  MAP: 8    I&O's Summary    20 Jul 2021 07:01  -  21 Jul 2021 07:00  --------------------------------------------------------  IN: 617.4 mL / OUT: 0 mL / NET: 617.4 mL    21 Jul 2021 07:01  -  21 Jul 2021 11:56  --------------------------------------------------------  IN: 16 mL / OUT: 0 mL / NET: 16 mL    Physical Exam:   GENERAL: NAD  HEENT: KATHY  ENMT: No tonsillar erythema, exudates, or enlargement, +ETT   NECK: Supple, No JVD, Normal thyroid  CHEST/LUNG: Coarse BS b/l   CVS: Regular rate and rhythm  GI: : Soft, Nontender, Nondistended; Bowel sounds present  NERVOUS SYSTEM:  Awake & alert, follows commands  EXTREMITIES:  2+ Peripheral Pulses, generalized edema, R thigh tenderness   LYMPH: No lymphadenopathy noted  SKIN: No rashes or lesions  PSYCH: Appropriate    Labs:                      7.6    12.54 )-----------( 252      ( 21 Jul 2021 03:27 )             25.1                         6.4    10.46 )-----------( 189      ( 20 Jul 2021 21:23 )             21.6                         7.6    13.29 )-----------( 223      ( 20 Jul 2021 03:11 )             25.9                         6.8    14.29 )-----------( 219      ( 19 Jul 2021 21:15 )             23.4                         7.2    12.15 )-----------( 150      ( 19 Jul 2021 05:30 )             25.0                         7.8    13.10 )-----------( 144      ( 18 Jul 2021 23:49 )             27.1                         7.9    12.24 )-----------( 114      ( 18 Jul 2021 03:44 )             27.2     07-21    134<L>  |  93<L>  |  32<H>  ----------------------------<  109<H>  4.1   |  23  |  4.75<H>  07-20    133<L>  |  92<L>  |  47<H>  ----------------------------<  129<H>  4.5   |  23  |  6.78<H>  07-19    135  |  96<L>  |  38<H>  ----------------------------<  161<H>  4.0   |  23  |  5.79<H>  07-18    134<L>  |  96<L>  |  36<H>  ----------------------------<  177<H>  3.7   |  24  |  5.45<H>  07-18    135  |  96<L>  |  26<H>  ----------------------------<  195<H>  3.7   |  26  |  4.43<H>    Ca    8.5      21 Jul 2021 03:27  Ca    8.5      20 Jul 2021 03:11  Phos  4.5     07-21  Phos  4.7     07-20  Mg     2.00     07-21  Mg     2.10     07-20    TPro  5.3<L>  /  Alb  2.3<L>  /  TBili  1.3<H>  /  DBili  x   /  AST  38  /  ALT  8   /  AlkPhos  109  07-21  TPro  5.2<L>  /  Alb  2.3<L>  /  TBili  1.0  /  DBili  x   /  AST  26  /  ALT  6   /  AlkPhos  94  07-20  TPro  4.9<L>  /  Alb  2.1<L>  /  TBili  0.4  /  DBili  x   /  AST  21  /  ALT  6   /  AlkPhos  88  07-19  TPro  5.3<L>  /  Alb  2.3<L>  /  TBili  0.4  /  DBili  x   /  AST  24  /  ALT  6   /  AlkPhos  111  07-18  TPro  5.3<L>  /  Alb  2.5<L>  /  TBili  0.4  /  DBili  x   /  AST  21  /  ALT  <5  /  AlkPhos  105  07-18    CAPILLARY BLOOD GLUCOSE  POCT Blood Glucose.: 120 mg/dL (21 Jul 2021 10:58)  POCT Blood Glucose.: 116 mg/dL (21 Jul 2021 05:50)  POCT Blood Glucose.: 127 mg/dL (20 Jul 2021 23:43)  POCT Blood Glucose.: 116 mg/dL (20 Jul 2021 17:17)    LIVER FUNCTIONS - ( 21 Jul 2021 03:27 )  Alb: 2.3 g/dL / Pro: 5.3 g/dL / ALK PHOS: 109 U/L / ALT: 8 U/L / AST: 38 U/L / GGT: x           PTT - ( 21 Jul 2021 03:27 )  PTT:42.1 sec    Studies  Chest X-RAY < from: Xray Chest 1 View- PORTABLE-Urgent (Xray Chest 1 View- PORTABLE-Urgent .) (07.19.21 @ 20:01) >    IMPRESSION:  Enteric tube looped upon itself in the stomach. The tip and side port are in the stomach.    ET tube and left IJ line as above.    Unchanged patchy right upper lung opacity. Improved patchy right lower lung opacity. Worsening left perihilar opacities.    Increased left basilar and retrocardiac opacity which may be due to a left pleural effusion with passive atelectasis, atelectasis of other cause, and/or pneumonia.    --- End of Report ---    < end of copied text >    CT SCAN Chest < from: CT Chest w/ IV Cont (07.13.21 @ 18:36) >  FINDINGS:  CHEST:  LUNGS, LARGE AIRWAYS, AND PLEURA: Minimal secretions within the distal trachea and right main stem bronchus. Endotracheal tube is in place. Small bilateral pleural effusions,,partially loculated, with adjacent passive atelectasis. Vascular congestion and groundglass opacities.  VESSELS: Left IJ approach central venous catheter with the tip in the left brachiocephalic vein. Air is seen within the left brachiocephalic, likely secondary to injections. Left axillary vein stent is patent. Calcifications of the aortic arch and coronary vessels. CABG.  HEART: Cardiomegaly. Left ventricular free wall fatty replacement, likely related to prior myocardial infarction. No pericardial effusion. Retained epicardial pacemaker leads.  MEDIASTINUM AND YOANA: No lymphadenopathy.  CHEST WALL AND LOWER NECK: Enlarged thyroid gland with multiple hypodense nodules bilaterally, the right containing calcifications, appear unchanged. Median sternotomy.  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  BILE DUCTS: Normal caliber.  GALLBLADDER: Within normal limits.  SPLEEN: Within normal limits.  PANCREAS: Within normal limits.  ADRENALS: Within normal limits.  KIDNEYS/URETERS: Atrophic bilateral kidneys. No hydronephrosis. Exophytic 1.3 cm cyst arising from the left kidney.    BLADDER: Decompressed.  REPRODUCTIVE ORGANS: Prostate within normal limits. Bilateral hydroceles.    BOWEL: No bowel obstruction. Appendix is normal. Enteric tube is present with the tip in stomach.  PERITONEUM: Mild to moderate ascites in the perihepatic and perisplenic regions, tracking along the paracolic gutters. Trace ascites in the pelvis.  VESSELS: Atherosclerotic changes.  RETROPERITONEUM/LYMPH NODES: No lymphadenopathy.  ABDOMINAL WALL: Diffuse subcutaneous edema.  BONES: Degenerative changes.    IMPRESSION:  *  Mild pulmonary edema with bilateral partially loculated pleural effusions with adjacent passive atelectasis.  *  Debris within the right mainstem with associated partial atelectasis of the right lower lobe.  *  Mild to moderate ascites.    < end of copied text >    < from: TRUMAN w/o TTE (w/3D Echo) (07.19.21 @ 09:02) >  OBSERVATIONS:  Mitral Valve: A large (about  1.3 cm X 0.3 cm),  mobile  echodensity is visualized attached to the atrial aspect of  the medial (P3) scallop of the posterior mitral valve  leaflet and is consistent with a vegetation.  In addition,  a portion of the anterior mitral leaflet is markedly  thickened and this may represent sessile vegetation.  Moderate mitral regurgitation.  Vena contracta width about  0.4 - 0.5 cm.  Aortic Root: Normal aortic root.  Mild non-mobile  atherosclerotic plaque in the aortic arch.  Severe,  non-mobile atherosclerotic plaque in the descending  thoracic aorta.  Aortic Valve: Calcified trileaflet aortic valve with normal  opening.  No vegetations seen in association with the  aortic valve. No aortic valve regurgitation seen.  Left Atrium: Normal left atrium.  No left atrial or left  atrial appendage thrombus.  Left Ventricle: Mild segmental left ventricular systolic  dysfunction.  Hypokinesis of the basal to mid inferior  wall.  Right Heart: Normal right atrium. Right ventricular  enlargement with decreased right ventricular systolic  function. Normal tricuspid valve.  No vegetations seen in  association with the tricuspid valve.  Moderate-severe  tricuspid regurgitation. Normal pulmonic valve.  No  vegetations seen in association with the pulmonic valve.  Mild pulmonic regurgitation.  Pericardium/PleuraNormal pericardium with no pericardial  effusion.  Hemodynamic: Agitated saline injection and color flow  Doppler demonstrate evidence of a patent foramen ovale.  ------------------------------------------------------------------------  CONCLUSIONS:  1. A large (about  1.3 cm X 0.3 cm),  mobile echodensity is  visualized attached to the atrial aspect of the medial (P3)  scallop of the posterior mitral valve leaflet and is  consistent with a vegetation.  In addition, a portion of  the anterior mitral leaflet is markedly thickened and this  may represent sessile vegetation. Moderate mitral  regurgitation.  Vena contracta width about  0.4 - 0.5 cm.  2. Calcified trileaflet aortic valve with normal opening.  No vegetations seen in association with the aortic valve.  No aortic valve regurgitation seen.  3. Normal aortic root.  Mild non-mobile atherosclerotic  plaque in the aortic arch.  Severe, non-mobile  atherosclerotic plaque in the descending thoracic aorta.  4. Normal left atrium.  No left atrial or left atrial  appendage thrombus.  5. Mild segmental left ventricular systolic dysfunction.  Hypokinesis of the basal to mid inferior wall.  6. Right ventricular enlargement with decreased right  ventricular systolic function.  7. Normal tricuspid valve.  No vegetations seen in  association with the tricuspid valve.  Moderate-severe  tricuspid regurgitation.  8. Agitated saline injection and color flow Doppler  demonstrate evidence of a patent foramen ovale.    < end of copied text >

## 2021-07-21 NOTE — PROCEDURE NOTE - ADDITIONAL PROCEDURE DETAILS
target vessel visualized with US. Target vessel compressible throughout. Needle tip visualized in center of lumen. guidewire and catheter easily advanced into place.
14Fr left pleural pigtail catheter placed under sterile precautions with ultrasound guidance.  Consent in chart.  850ml serous fluid drained initially with pigtail on water-seal.  No complications.  Patient tolerated procedure well.  CXR pending.

## 2021-07-21 NOTE — PROGRESS NOTE ADULT - ASSESSMENT
71 yo M with PMhx of CAD s/p CABG (course at that time complicated by pleural effusion requiring chest tubes), ESRD on HD (MWF), DMII, HTN, afib on coumadin, and anemia presented from [facility] for evaluation of chest pain and GIB.       left sided pleural effusion: loculated and chronic:   CAD:  CABG:  ESRD:   DM:  HTN:  A fibrillation:   GI Bleed    7/7:    left sided pleural effusion: loculated and chronic: he has chr loculated pleural effusion on left side: He had chest tube placement in last June 2020 at another hospital: no chemistry is available but he had negative cytology at that time: This time the effusion seems slightly worse and has loculated effusion with pleural thickening: heis not SOB andhasbeen on roomair: I think , it at all, if this effusion needs to be dealt with : it is probably vats: will contact thoracic surgery : etiology not very clear as there is no previous chemistry: coult be exudative or transudatve: he is on HD and now it is a chr christiano effusion   CAD: cont per cards  CABG: cont current meds  ESRD: on HD   DM: monitor and control  HTN: controlled  A fibrillation: off ac:   GI Bleed: per gi :  DW pt and t eam1    7/8:    left sided pleural effusion:: fever: s/p left sided chest tube placement: cultures sent: however with pr criteria it seems transudative await serum LDH but pleural fluid LDH is low:  already started on broad spectrum antibiotics ID to see: await cultures:   CAD: cont per cards  CABG: cont current meds  ESRD: on HD   DM: monitor and control  HTN: controlled  A fibrillation: off ac:   GI Bleed: per gi :  DW pmd    7/9:    left sided pleural effusion:: fever: s/p left sided chest tube placement: cultures sent: however with pr criteria it seems borderline transudative and transudative by LDH criteria: the vulutres ahve been negative so far:  already started on broad spectrum antibiotics ID to see: await cultures: ? source of spesis  CAD: cont per cards  CABG: cont current meds  ESRD: on HD   DM: monitor and control  HTN: in shock: now on vasopressors:   A fibrillation: off ac:   GI Bleed: per gi :  DW pmd  micu team    7/11:      left sided pleural effusion:: fever: s/p left sided chest tube placement: cultures sent: however with pr criteria it seems borderline transudative and transudative by LDH criteria: the cultures have been negative so far:  already started on broad spectrum antibiotics : Has MSSA bactermia  CAD: cont per cards: still on vasopressors: wean as tolerated ? needs molly ro tule out IE : would defer to cards:   CABG: cont current meds  ESRD: on HD   DM: monitor and control  HTN: in shock: now on vasopressors:   A fibrillation: off ac:   GI Bleed: per gi :  DW pmd  micu team    7/12:    left sided pleural effusion:: fever: s/p left sided chest tube placement:- now removed: cultures sent: however with pr criteria it seems borderline transudative and transudative by LDH criteria: the cultures have been negative so far:  already started on broad spectrum antibiotics : Has MRSA bactermia: vanco restarted yesterday ? echo/molly  Ac hypercarbic resp fialure: needs to be watched closelyin MICU : needs bipap may need intubation" At this time he is alert and awake: and responds to simple questions:    CAD: cont per cards: still on vasopressors: wean as tolerated ? needs molly ro tule out IE : would defer to cards:   CABG: cont current meds  ESRD: on HD   DM: monitor and control  HTN: in shock: now on vasopressors:   A fibrillation: off ac:   GI Bleed: per gi :  DW pmd  cards and bedstaff    7/13:  left sided pleural effusion:: fever: s/p left sided chest tube placement:- now removed: cultures sent: however with pr criteria it seems borderline transudative and transudative by LDH criteria: the cultures have been negative so far:  already started on broad spectrum antibiotics : Has MRSA bacteremia vanco restarted yesterday ? echo/molly  Ac hypercarbic resp fialure: now intubated s/p acls protocol last night   CAD: cont per cards: still on vasopressors: wean as tolerated ? needs molly ro tule out IE : would defer to cards:   CABG: cont current meds  ESRD: on HD   DM: monitor and control  HTN: in shock: now on vasopressors:   A fibrillation: off ac:   GI Bleed: per gi :  LINDA  staff  pt is critically ill now:     7/14:  left sided pleural effusion:: fever: s/p left sided chest tube placement:- now removed: cultures sent: however with pr criteria it seems borderline transudative and transudative by LDH criteria: the cultures have been negative so far:  already started on broad spectrum antibiotics : Has MRSA bacteremia vanco restarted yesterday now for  echo/molly  Ac hypercarbic resp fialure: now intubated s/p acls protocol last night -s/p cardiac arrest: ROSC 10 mts:   CAD: cont per cards: still on vasopressors: wean as tolerated ? needs molly ro tule out IE : would defer to cards: Cotn antbitioc  CABG: cont current meds  ESRD: on HD   DM: monitor and control  HTN: in shock: now on vasopressors:   A fibrillation: off ac:   GI Bleed: per gi :  DW  staff  pt is critically ill now: dw cards    7/15:    left sided pleural effusion:: transudative pl effusion with no with: certainly not the source for MRSA in blood:   Ac hypercarbic resp fialure: now intubated s/p acls protocol last night -s/p cardiac arrest: ROSC 10 mts:   CAD: cont per cards: still on vasopressors: wean as tolerated ? needs molly ro tule out IE : would defer to cards: Cotn antbitioc  CABG: cont current meds  ESRD: on HD   DM: monitor and control  HTN: in shock: now on vasopressors:   A fibrillation: on ac:   GI Bleed: per gi :  DW  staff  pt is critically ill now:     7/16"  left sided pleural effusion:: transudative pl effusion with no with: certainly not the source for MRSA in blood: ?source for MRSA:   Ac hypercarbic resp fialure: now intubated s/p acls protocol last night -s/p cardiac arrest: ROSC 10 mts: he seems to respond off sedation:per MICU note:   CAD: cont per cards: still on vasopressors: wean as tolerated ? needs molly ro tule out IE : would defer to cards: Cotn antbitioc  CABG: cont current meds  ESRD: on HD   DM: monitor and control  HTN: in shock: now on vasopressors:   A fibrillation: on ac:   GI Bleed: per gi :  DW  staff: PMD  pt is critically ill now:      7/17:    left sided pleural effusion:: transudative pl effusion with no with: certainly not the source for MRSA in blood: ?source for MRSA: for molly on monday  Ac hypercarbic resp fialure: now intubated s/p acls protocol last night -s/p cardiac arrest: ROSC 10 mts:   CAD: cont per cards: still on vasopressors: wean as tolerated ? needs molly ro tule out IE : would defer to cards: Cont antibiotics:   CABG: cont current meds  ESRD: on HD   DM: monitor and control  HTN: in shock: now on vasopressors:   A fibrillation: on ac:   GI Bleed: per gi :  DW  staff: PMD  pt is critically ill now:    7/18:    left sided pleural effusion:: transudative pl effusion with no with: certainly not the source for MRSA in blood: ?source for MRSA: for molly on monday: he is off sedation   Ac hypercarbic resp fialure: now intubated s/p acls protocol last night -s/p cardiac arrest: ROSC 10 mts:   Acute cva: noted on ct scan head: neurology following: already on heparin   CAD: cont per cards: still on vasopressors: wean as tolerated ? needs molly ro tule out IE : would defer to cards: Cont antibiotics:   CABG: cont current meds  ESRD: on HD   DM: monitor and control  HTN: in shock: now on vasopressors:   A fibrillation: on ac:   GI Bleed: per gi :  pt is critically ill now:    7/19:      left sided pleural effusion:: transudative pl effusion with no with: certainly not the source for MRSA in blood: ?source for MRSA: for molly today: he is off sedation but still on pressors:   Ac hypercarbic resp fialure: now intubated s/p acls protocol last night -s/p cardiac arrest: ROSC 10 mts:   Acute cva: noted on ct scan head: neurology following: already on heparin   CAD: cont per cards: still on vasopressors: wean as tolerated ? needs molly ro tule out IE : would defer to cards: Cont antibiotics: for molly today  CABG: cont current meds  ESRD: on HD   DM: monitor and control  HTN: in shock: now on vasopressors:   A fibrillation: on ac:   GI Bleed: per gi :  pt is critically ill now:    7/20  Acute Hypercarbic Respiratory Failure  -S/p RRT, tx to MICU 7/8, intubated 7/13 s/p cardiac arrest w/ ROSC  -CPAP trials, ventilator management per MICU team  -Pulmonary toileting, suction PRN   L pleural effusion   -Loculated L pleural effusion on CT chest, s/p CT placement by thoracic 7/8  -Transudative effusion, Cx negative   -CT since removed   -Repeat CT chest with small b/l partially loculated effusions, adjacent atelectasis  Bacteremia  -BC 7/9 MRSA+  -ABX per ID  -Repeat BC NGTD   -F/u cardiology recs regarding MOLLY   CAD (s/p CABG) - per cards   ESRD - HD per renal   GI bleed - GI recs noted     7/21  Acute Hypercarbic Respiratory Failure  -S/p RRT, tx to MICU 7/8, intubated 7/13 s/p cardiac arrest w/ ROSC  -CPAP trials, ventilator management per MICU team  -Pulmonary toileting, suction PRN   L pleural effusion   -Loculated L pleural effusion on CT chest, s/p CT placement by thoracic 7/8  -Transudative effusion, Cx negative   -CT since removed   -Repeat CT chest with small b/l partially loculated effusions, adjacent atelectasis  Bacteremia  -BC 7/9 MRSA+  -ABX per ID  -Repeat BC NGTD   -MOLLY now with MV vegetation per cards  -Pending CT lumbar spine   -F/u CT surgery recs  CAD (s/p CABG) - per cards   ESRD - HD per renal   GI bleed - GI recs noted

## 2021-07-21 NOTE — CONSULT NOTE ADULT - CONSULT REQUESTED BY NAME
Medicine
Dr MIKE SANTA
Dr. Méndez
PCP
Raysa Leija
MICU
Dr Colin
dr. camarillo
Dr Colin
Dr. Colin
MICU

## 2021-07-21 NOTE — PROGRESS NOTE ADULT - SUBJECTIVE AND OBJECTIVE BOX
Date of service 7/21/21    EP     tele: AFib  prior PEA/Jay/Asystole arrest.  resuscitated and intubated  antibiotics for MRSA bacteremia.  CT head w/ interval hypodensities concerning for stroke.  waking up vent, not cooperative, trying to get out of bed.    Review of Systems:   Constitutional: [ ] fevers, [ ] chills.   Skin: [ ] dry skin. [ ] rashes.  Psychiatric: [ ] depression, [ ] anxiety.   Gastrointestinal: [ ] BRBPR, [ ] melena.   Neurological: [ ] confusion. [ ] seizures. [ ] shuffling gait.   Ears,Nose,Mouth and Throat: [ ] ear pain [ ] sore throat.   Eyes: [ ] diplopia.   Respiratory: [ ] hemoptysis. [ ] shortness of breath  Cardiovascular: See HPI above  Hematologic/Lymphatic: [ ] anemia. [ ] painful nodes. [ ] prolonged bleeding.   Genitourinary: [ ] hematuria. [ ] flank pain.   Endocrine: [ ] significant change in weight. [ ] intolerance to heat and cold.     Review of systems [ ] otherwise negative, [x ] otherwise unable to obtain    FH: no family history of sudden cardiac death in first degree relatives    SH: [ ] tobacco, [ ] alcohol, [ ] drugs    acetaminophen   Tablet .. 1000 milliGRAM(s) Oral every 6 hours PRN  atorvastatin 40 milliGRAM(s) Oral at bedtime  calamine/zinc oxide Lotion 1 Application(s) Topical three times a day PRN  chlorhexidine 0.12% Liquid 15 milliLiter(s) Oral Mucosa every 12 hours  chlorhexidine 2% Cloths 1 Application(s) Topical daily  dextrose 40% Gel 15 Gram(s) Oral once  dextrose 40% Gel 15 Gram(s) Oral once  dextrose 5%. 1000 milliLiter(s) IV Continuous <Continuous>  dextrose 5%. 1000 milliLiter(s) IV Continuous <Continuous>  dextrose 50% Injectable 25 Gram(s) IV Push once  dextrose 50% Injectable 12.5 Gram(s) IV Push once  dextrose 50% Injectable 25 Gram(s) IV Push once  diltiazem    Tablet 30 milliGRAM(s) Oral four times a day  epoetin danilo-epbx (RETACRIT) Injectable 19330 Unit(s) IV Push <User Schedule>  glucagon  Injectable 1 milliGRAM(s) IntraMuscular once  insulin lispro (ADMELOG) corrective regimen sliding scale   SubCutaneous every 6 hours  midazolam Injectable 4 milliGRAM(s) IV Push once  norepinephrine Infusion 0.02 MICROgram(s)/kG/Min IV Continuous <Continuous>  pantoprazole  Injectable 40 milliGRAM(s) IV Push every 12 hours                            7.6    12.54 )-----------( 252      ( 21 Jul 2021 03:27 )             25.1     134<L>  |  93<L>  |  32<H>  ----------------------------<  109<H>  4.1   |  23  |  4.75<H>    Ca    8.5      21 Jul 2021 03:27  Phos  4.5     07-21  Mg     2.00     07-21    TPro  5.3<L>  /  Alb  2.3<L>  /  TBili  1.3<H>  /  DBili  x   /  AST  38  /  ALT  8   /  AlkPhos  109  07-21    T(C): 37.3 (07-21-21 @ 12:00), Max: 37.7 (07-21-21 @ 04:00)  HR: 78 (07-21-21 @ 14:07) (69 - 86)  BP: 102/20 (07-21-21 @ 14:00) (77/48 - 130/39)  RR: 23 (07-21-21 @ 14:00) (14 - 28)  SpO2: 94% (07-21-21 @ 14:07) (92% - 100%)      General: awake, confused/uncooperative  Head: Normocephalic and atraumatic.  right facial droop  Neck: No JVD. No bruits. Supple. Does not appear to be enlarged.  Left IJ CVL.  Intubated.    Cardiovascular: + S1,S2 ; IRR Soft systolic murmur at the left lower sternal border. No rubs noted.    Lungs: coarse breath sounds bL.   Abdomen: + BS, soft. Non distended.   Extremities: limbs warm, edematous  Psychiatric: unable to assess due to sedation  Musculoskeletal: spontaneously moves fingers but not legs, does not do hand- on command.      A/P) 69 y/o male PMH CAD s/p CABG, PAF/AFL on coumadin, HTN, hyperlipidemia, DM, ESRD on HD, hypothyroidism a/w sepsis    -continue diltiazem for AF rate control.  -heparin infusion for secondary stroke prevention  -s/p PEA/jay/asystole arrest, secondary to critical illness and infection.  no pacemaker or ICD indicated.  -no further inpatient EP workup needed    -will follow.

## 2021-07-21 NOTE — PROGRESS NOTE ADULT - SUBJECTIVE AND OBJECTIVE BOX
Follow Up:      Inverval History/ROS:Patient is a 70y old  Male who presents with a chief complaint of Chest pain and GIB (21 Jul 2021 16:49)    Intubated  More awake  Not a candidate for surgery per CT surrg  New leg heamtoma    Allergies    lisinopril (Other)  opioid-like analgesics (Other)    Intolerances        ANTIMICROBIALS:      OTHER MEDS:  acetaminophen   Tablet .. 1000 milliGRAM(s) Oral every 6 hours PRN  atorvastatin 40 milliGRAM(s) Oral at bedtime  calamine/zinc oxide Lotion 1 Application(s) Topical three times a day PRN  chlorhexidine 0.12% Liquid 15 milliLiter(s) Oral Mucosa every 12 hours  chlorhexidine 2% Cloths 1 Application(s) Topical daily  dextrose 40% Gel 15 Gram(s) Oral once  dextrose 40% Gel 15 Gram(s) Oral once  dextrose 5%. 1000 milliLiter(s) IV Continuous <Continuous>  dextrose 5%. 1000 milliLiter(s) IV Continuous <Continuous>  dextrose 50% Injectable 25 Gram(s) IV Push once  dextrose 50% Injectable 12.5 Gram(s) IV Push once  dextrose 50% Injectable 25 Gram(s) IV Push once  diltiazem    Tablet 30 milliGRAM(s) Oral four times a day  epoetin danilo-epbx (RETACRIT) Injectable 64662 Unit(s) IV Push <User Schedule>  glucagon  Injectable 1 milliGRAM(s) IntraMuscular once  insulin lispro (ADMELOG) corrective regimen sliding scale   SubCutaneous every 6 hours  norepinephrine Infusion 0.02 MICROgram(s)/kG/Min IV Continuous <Continuous>  pantoprazole  Injectable 40 milliGRAM(s) IV Push every 12 hours      Vital Signs Last 24 Hrs  T(C): 37.7 (21 Jul 2021 20:00), Max: 37.7 (21 Jul 2021 04:00)  T(F): 99.9 (21 Jul 2021 20:00), Max: 99.9 (21 Jul 2021 20:00)  HR: 74 (21 Jul 2021 20:00) (69 - 86)  BP: 96/60 (21 Jul 2021 20:00) (87/60 - 139/42)  BP(mean): 69 (21 Jul 2021 20:00) (39 - 95)  RR: 20 (21 Jul 2021 20:00) (14 - 28)  SpO2: 99% (21 Jul 2021 20:00) (92% - 100%)    PHYSICAL EXAM:  General: [ x]intubated  HEAD/EYES: [ ] PERRL [ x] white sclera [ ] icterus  ENT:  [ ] normal x[ ] supple [ ] thrush [ ] pharyngeal exudate  Cardiovascular:   [ ] murmur x[ ] normal [ ] PPM/AICD  Respiratory:  [x ] clear to ausculation bilaterally  GI:  [x ] soft, non-tender, normal bowel sounds  :  [ ] wagner [x ] no CVA tenderness   Musculoskeletal:  [ ] no synovitis  Neurologic:  [ ] non-focal exam   Skin:  [x ] no rash  Lymph: [x ] no lymphadenopathy  Psychiatric:  [ ] appropriate affect [ ] alert & oriented  Lines:  [x ] no phlebitis [ ] central line                                7.1    14.58 )-----------( 275      ( 21 Jul 2021 18:08 )             24.0       07-21    134<L>  |  93<L>  |  32<H>  ----------------------------<  109<H>  4.1   |  23  |  4.75<H>    Ca    8.5      21 Jul 2021 03:27  Phos  4.5     07-21  Mg     2.00     07-21    TPro  5.3<L>  /  Alb  2.3<L>  /  TBili  1.3<H>  /  DBili  x   /  AST  38  /  ALT  8   /  AlkPhos  109  07-21          MICROBIOLOGY:    RADIOLOGY:

## 2021-07-21 NOTE — CONSULT NOTE ADULT - ASSESSMENT
71 yo M with complex PMhx of CAD s/p CABG (course at that time complicated by pleural effusion requiring chest tubes), ESRD on HD (MWF), DMII, HTN, afib on coumadin, and anemia presented from [facility] for evaluation of chest pain and GIB. Since admission to [facility] pt without evidence of GIB. Course has been c/b cardiac arrest on 07/13, intubated, left pleural effusion s/p chest tube (now removed) and sepsis with MRSA bacteremia requiring pressor support. Most recent pt found with vegetations on TTE. Vascular surgery consulted for newly noted right thigh hematoma/concern for compartment syndrome.     - Heparin gtt for afib is held in setting of hematoma/transfusion requirements   - Continue to hold AC, trend H&H   - Monitor vitals, pressor requirements  - Monitor for signs of compartment syndrome   - Trend CPK, q1hr neurovascular checks  - No acute surgical intervention, will follow   - Discussed with vascular surgery fellow     Janeen Melvin PGY3   C Team Surgery   [id number]

## 2021-07-21 NOTE — PROGRESS NOTE ADULT - SUBJECTIVE AND OBJECTIVE BOX
INTERVAL HPI/OVERNIGHT EVENTS:    seen this morning, intubated in ICU  events noted; still negative occult  remains without rectal bleeding      MEDICATIONS  (STANDING):  atorvastatin 40 milliGRAM(s) Oral at bedtime  chlorhexidine 0.12% Liquid 15 milliLiter(s) Oral Mucosa every 12 hours  chlorhexidine 2% Cloths 1 Application(s) Topical daily  dextrose 40% Gel 15 Gram(s) Oral once  dextrose 40% Gel 15 Gram(s) Oral once  dextrose 5%. 1000 milliLiter(s) (50 mL/Hr) IV Continuous <Continuous>  dextrose 5%. 1000 milliLiter(s) (100 mL/Hr) IV Continuous <Continuous>  dextrose 50% Injectable 25 Gram(s) IV Push once  dextrose 50% Injectable 12.5 Gram(s) IV Push once  dextrose 50% Injectable 25 Gram(s) IV Push once  diltiazem    Tablet 30 milliGRAM(s) Oral four times a day  epoetin danilo-epbx (RETACRIT) Injectable 98530 Unit(s) IV Push <User Schedule>  glucagon  Injectable 1 milliGRAM(s) IntraMuscular once  insulin lispro (ADMELOG) corrective regimen sliding scale   SubCutaneous every 6 hours  midazolam Injectable 4 milliGRAM(s) IV Push once  norepinephrine Infusion 0.02 MICROgram(s)/kG/Min (1.62 mL/Hr) IV Continuous <Continuous>  pantoprazole  Injectable 40 milliGRAM(s) IV Push every 12 hours    MEDICATIONS  (PRN):  acetaminophen   Tablet .. 1000 milliGRAM(s) Oral every 6 hours PRN Temp greater or equal to 38C (100.4F), Mild Pain (1 - 3), Moderate Pain (4 - 6)  calamine/zinc oxide Lotion 1 Application(s) Topical three times a day PRN Itching      Allergies    lisinopril (Other)  opioid-like analgesics (Other)    Intolerances        Review of Systems: *intubated, unobtainable       Vital Signs Last 24 Hrs  T(C): 37.4 (21 Jul 2021 08:00), Max: 37.7 (21 Jul 2021 04:00)  T(F): 99.4 (21 Jul 2021 08:00), Max: 99.8 (21 Jul 2021 04:00)  HR: 78 (21 Jul 2021 11:08) (65 - 86)  BP: 114/35 (21 Jul 2021 10:00) (77/48 - 130/39)  BP(mean): 53 (21 Jul 2021 10:00) (43 - 95)  RR: 18 (21 Jul 2021 10:00) (14 - 29)  SpO2: 97% (21 Jul 2021 11:08) (92% - 100%)    PHYSICAL EXAM:    Constitutional: NAD  HEENT: EOMI, throat clear  Neck: No LAD, supple  Respiratory: intubated   Cardiovascular: S1 and S2, RRR, no M  Gastrointestinal: BS+, soft, NT/ND, neg HSM,  Extremities: No peripheral edema, neg clubbing, cyanosis  Vascular: 2+ peripheral pulses  Neurological: A/O x 1  Psychiatric: confused  Skin: No rashes      LABS:                        7.6    12.54 )-----------( 252      ( 21 Jul 2021 03:27 )             25.1     07-21    134<L>  |  93<L>  |  32<H>  ----------------------------<  109<H>  4.1   |  23  |  4.75<H>    Ca    8.5      21 Jul 2021 03:27  Phos  4.5     07-21  Mg     2.00     07-21    TPro  5.3<L>  /  Alb  2.3<L>  /  TBili  1.3<H>  /  DBili  x   /  AST  38  /  ALT  8   /  AlkPhos  109  07-21    PTT - ( 21 Jul 2021 03:27 )  PTT:42.1 sec      RADIOLOGY & ADDITIONAL TESTS:

## 2021-07-21 NOTE — PROGRESS NOTE ADULT - SUBJECTIVE AND OBJECTIVE BOX
Mainor Bowers  PGY-1 | Internal Medicine      INTERVAL HPI/OVERNIGHT EVENTS: Patient's peripheral IV access was lost during contrast administration for CT scan. An arrow was put in o/n and its was working well. Patient's hgb dropped again to 6.4 requiring 1u of pRBC with improvement to 7.6.    SUBJECTIVE: Patient seen and examined at bedside. Patient is intubated and off of sedation. Holding his right leg.    ROS: Unable to assess    OBJECTIVE:    VITAL SIGNS:  ICU Vital Signs Last 24 Hrs  T(C): 37.3 (21 Jul 2021 12:00), Max: 37.7 (21 Jul 2021 04:00)  T(F): 99.1 (21 Jul 2021 12:00), Max: 99.8 (21 Jul 2021 04:00)  HR: 78 (21 Jul 2021 14:07) (69 - 86)  BP: 102/20 (21 Jul 2021 14:00) (77/48 - 130/39)  BP(mean): 39 (21 Jul 2021 14:00) (39 - 95)  RR: 23 (21 Jul 2021 14:00) (14 - 28)  SpO2: 94% (21 Jul 2021 14:07) (92% - 100%)    Mode: CPAP with PS, FiO2: 40, PEEP: 5, PS: 10, MAP: 8, PIP: 16    07-20 @ 07:01  -  07-21 @ 07:00  --------------------------------------------------------  IN: 617.4 mL / OUT: 0 mL / NET: 617.4 mL    07-21 @ 07:01  -  07-21 @ 14:57  --------------------------------------------------------  IN: 13.6 mL / OUT: 0 mL / NET: 13.6 mL      CAPILLARY BLOOD GLUCOSE      POCT Blood Glucose.: 120 mg/dL (21 Jul 2021 10:58)      PHYSICAL EXAM:    General: NAD. Patient is awake. Patient is intubated and not sedated. Sitting comfortably  HEENT: NC/AT; PERRL, clear conjunctiva  Neck: supple  Respiratory: CTA b/l  Cardiovascular: +S1/S2; RRR  Abdomen: soft, NT/ND; +BS x4  Extremities: WWP, 2+ peripheral pulses b/l; no LE edema. Right thigh tight and tender. Unclear if worsen with movement as patient is not able to communicate. No pallor or paralysis  Skin: normal color and turgor; no rash  Neurological: Awake. Opens eyes and follows certain commands    MEDICATIONS:  MEDICATIONS  (STANDING):  atorvastatin 40 milliGRAM(s) Oral at bedtime  chlorhexidine 0.12% Liquid 15 milliLiter(s) Oral Mucosa every 12 hours  chlorhexidine 2% Cloths 1 Application(s) Topical daily  dextrose 40% Gel 15 Gram(s) Oral once  dextrose 40% Gel 15 Gram(s) Oral once  dextrose 5%. 1000 milliLiter(s) (50 mL/Hr) IV Continuous <Continuous>  dextrose 5%. 1000 milliLiter(s) (100 mL/Hr) IV Continuous <Continuous>  dextrose 50% Injectable 25 Gram(s) IV Push once  dextrose 50% Injectable 12.5 Gram(s) IV Push once  dextrose 50% Injectable 25 Gram(s) IV Push once  diltiazem    Tablet 30 milliGRAM(s) Oral four times a day  epoetin danilo-epbx (RETACRIT) Injectable 60544 Unit(s) IV Push <User Schedule>  glucagon  Injectable 1 milliGRAM(s) IntraMuscular once  insulin lispro (ADMELOG) corrective regimen sliding scale   SubCutaneous every 6 hours  midazolam Injectable 4 milliGRAM(s) IV Push once  norepinephrine Infusion 0.02 MICROgram(s)/kG/Min (1.62 mL/Hr) IV Continuous <Continuous>  pantoprazole  Injectable 40 milliGRAM(s) IV Push every 12 hours    MEDICATIONS  (PRN):  acetaminophen   Tablet .. 1000 milliGRAM(s) Oral every 6 hours PRN Temp greater or equal to 38C (100.4F), Mild Pain (1 - 3), Moderate Pain (4 - 6)  calamine/zinc oxide Lotion 1 Application(s) Topical three times a day PRN Itching      ALLERGIES:  Allergies    lisinopril (Other)  opioid-like analgesics (Other)    Intolerances        LABS:                        7.6    12.54 )-----------( 252      ( 21 Jul 2021 03:27 )             25.1     07-21    134<L>  |  93<L>  |  32<H>  ----------------------------<  109<H>  4.1   |  23  |  4.75<H>    Ca    8.5      21 Jul 2021 03:27  Phos  4.5     07-21  Mg     2.00     07-21    TPro  5.3<L>  /  Alb  2.3<L>  /  TBili  1.3<H>  /  DBili  x   /  AST  38  /  ALT  8   /  AlkPhos  109  07-21    PTT - ( 21 Jul 2021 03:27 )  PTT:42.1 sec      RADIOLOGY & ADDITIONAL TESTS: Reviewed.    < from: CT Angio Lower Extremity w/ IV Cont, Bilateral (07.21.21 @ 13:36) >  IMPRESSION:  A moderate-sized hematoma in the right adductor muscle group. No active extravasation on arterial phase.  Multilevel mild to moderate stenosis of the femoral and popliteal arteries bilaterally.    < end of copied text >      < from: CT Lumbar Spine Reform w/ IV Cont (07.21.21 @ 13:35) >  IMPRESSION:    Similar-appearing erosive changes involving the superior endplate of T12 and inferior endplate of L1, likely representing the presence of Schmorl's nodes.    No other levels of endplate erosion or destruction. No abnormal prevertebral or paravertebral soft tissue density.    No CT evidence for advanced discitis/ostium myelitis. Please note that MRI is more sensitive examination for the detection of early discitis/osteomyelitis.    < end of copied text >   Was the patient seen in the last year in this department? Yes     Does patient have an active prescription for medications requested? No     Received Request Via: Pharmacy

## 2021-07-21 NOTE — PROGRESS NOTE ADULT - ATTENDING COMMENTS
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

## 2021-07-21 NOTE — PROGRESS NOTE ADULT - SUBJECTIVE AND OBJECTIVE BOX
CARDIOLOGY ATTENDING    tele - SR    S: remains intubated but is moving extremities today; ros limited     Review of Systems:   Constitutional: [ ] fevers, [ ] chills.   Skin: [ ] dry skin. [ ] rashes.  Psychiatric: [ ] depression, [ ] anxiety.   Gastrointestinal: [ ] BRBPR, [ ] melena.   Neurological: [ ] confusion. [ ] seizures. [ ] shuffling gait.   Ears,Nose,Mouth and Throat: [ ] ear pain [ ] sore throat.   Eyes: [ ] diplopia.   Respiratory: [ ] hemoptysis. [ ] shortness of breath  Cardiovascular: See HPI above  Hematologic/Lymphatic: [ ] anemia. [ ] painful nodes. [ ] prolonged bleeding.   Genitourinary: [ ] hematuria. [ ] flank pain.   Endocrine: [ ] significant change in weight. [ ] intolerance to heat and cold.     Review of systems [ ] otherwise negative, [x ] otherwise unable to obtain    FH: no family history of sudden cardiac death in first degree relatives    SH: [ ] tobacco, [ ] alcohol, [ ] drugs    acetaminophen   Tablet .. 1000 milliGRAM(s) Oral every 6 hours PRN  atorvastatin 40 milliGRAM(s) Oral at bedtime  calamine/zinc oxide Lotion 1 Application(s) Topical three times a day PRN  chlorhexidine 0.12% Liquid 15 milliLiter(s) Oral Mucosa every 12 hours  chlorhexidine 2% Cloths 1 Application(s) Topical daily  dextrose 40% Gel 15 Gram(s) Oral once  dextrose 40% Gel 15 Gram(s) Oral once  dextrose 5%. 1000 milliLiter(s) IV Continuous <Continuous>  dextrose 5%. 1000 milliLiter(s) IV Continuous <Continuous>  dextrose 50% Injectable 25 Gram(s) IV Push once  dextrose 50% Injectable 12.5 Gram(s) IV Push once  dextrose 50% Injectable 25 Gram(s) IV Push once  diltiazem    Tablet 30 milliGRAM(s) Oral four times a day  epoetin danilo-epbx (RETACRIT) Injectable 89725 Unit(s) IV Push <User Schedule>  glucagon  Injectable 1 milliGRAM(s) IntraMuscular once  insulin lispro (ADMELOG) corrective regimen sliding scale   SubCutaneous every 6 hours  norepinephrine Infusion 0.02 MICROgram(s)/kG/Min IV Continuous <Continuous>  pantoprazole  Injectable 40 milliGRAM(s) IV Push every 12 hours                            7.1    14.58 )-----------( 275      ( 21 Jul 2021 18:08 )             24.0       07-21    134<L>  |  93<L>  |  32<H>  ----------------------------<  109<H>  4.1   |  23  |  4.75<H>    Ca    8.5      21 Jul 2021 03:27  Phos  4.5     07-21  Mg     2.00     07-21    TPro  5.3<L>  /  Alb  2.3<L>  /  TBili  1.3<H>  /  DBili  x   /  AST  38  /  ALT  8   /  AlkPhos  109  07-21      CARDIAC MARKERS ( 21 Jul 2021 18:08 )  x     / x     / 633 U/L / x     / x            T(C): 36.8 (07-21-21 @ 16:00), Max: 37.7 (07-21-21 @ 04:00)  HR: 78 (07-21-21 @ 18:46) (69 - 86)  BP: 109/51 (07-21-21 @ 18:00) (87/60 - 137/34)  RR: 22 (07-21-21 @ 18:00) (14 - 28)  SpO2: 100% (07-21-21 @ 18:46) (92% - 100%)  Wt(kg): --    I&O's Summary    20 Jul 2021 07:01  -  21 Jul 2021 07:00  --------------------------------------------------------  IN: 617.4 mL / OUT: 0 mL / NET: 617.4 mL    21 Jul 2021 07:01  -  21 Jul 2021 19:10  --------------------------------------------------------  IN: 78.4 mL / OUT: 0 mL / NET: 78.4 mL        General: intubated; sedated   Head: Normocephalic and atraumatic.   Neck: No JVD. No bruits. Supple. Does not appear to be enlarged.   Cardiovascular: + S1,S2 ; RRR Soft systolic murmur at the left lower sternal border. No rubs noted.    Lungs: decreased BS BL  Abdomen: + BS, soft. Non tender. Non distended. No rebound. No guarding.   Extremities: No clubbing/cyanosis/edema.   Neurologic: unable to assess  Skin: Warm and moist. The patient's skin has normal elasticity and good skin turgor.   Psychiatric: unable to assess   Musculoskeletal: unable to assess    DATA  < from: Transthoracic Echocardiogram (07.07.21 @ 18:17) >  ------------------------------------------------------------------------  CONCLUSIONS:  1. Mitral annular calcification, otherwise normal mitral  valve. Minimal mitral regurgitation.  2. Endocardium not well visualized; grossly normal left  ventricular systolic function.  3. Right ventricular enlargement with decreased right  ventricular systolic function.  4. Inadequate tricuspid regurgitation Doppler envelope  precludes estimation of RVSP.  ------------------------------------------------------------------------  Confirmed on  7/7/2021 - 21:16:20 by Osvaldo Bertrand M.D.,  [facility], [facility]    < end of copied text >    Tele: SR, 3 beats NSVT     A/P) 69 y/o male PMH CAD s/p CABG and old PCI, AFL x 1 year on coumadin, HTN, hyperlipidemia, DM, ESRD on HD, and hypothyroidism originally admitted to [facility] with GIB, transferred to [facility], found to have septic shock and MRSA bacteremia.     -Events noted; pt. s/p PEA arrest   -Repeat TTE with only mild segmental LV dysfunction in the inferoseptum  -Given negative CPK, do not suspect acs  -Suspect elevated troponin in the setting of demand ischemia from underlying septic shock  -Pressor support per MICU  -Monitor MS - follow up neuro   -EP follow up appreciated   -Given MRSA bacteremia, TRUMAN performed demonstrating MV endocarditis - discussed with CTS Dr. Pierce - pt. not a surgical candidate at this time   -Abx per ID  -Monitor BCx   -CTH head noted - pt. with cva - ac was restarted but now held due to dropping h/h   -Right thigh hematoma workup per MICU and vascular surgery     Kalie Lau MD

## 2021-07-21 NOTE — CONSULT NOTE ADULT - SUBJECTIVE AND OBJECTIVE BOX
HPI:   71 yo M with PMhx of CAD s/p CABG (course at that time complicated by pleural effusion requiring chest tubes), ESRD on HD (MWF), DMII, HTN, afib on coumadin, and anemia presented from [facility] for evaluation of chest pain and GIB. Patient is AAOX2 and was able to provide some information. Most of the information was obtained from charting. Patient was admitted to [facility] after developing chest pain during HD. He was found to have Hgb of 6 and elevated trops concerning for GIB and NSTEM II. He was give 2 units of pRBCs and monitored off warfarin/aspirin. He underwent bowel prep but it did not show any bloody stool. As a result, no colonoscopy was performed. His course was complicated by aflutter/afib with RVR. He was managed with metoprolol and never required cardioversion. On day of transfer, his Hgb was in the 8s. Patient was deemed stable enough to be restarted on aspirin and warfarin. Patient was transferred to [facility] for further evaluation. Currently, patient have no symptoms but reports to have intermittent chest pain, that is suprasternal, non-radiating, exertional at times, and non-reproducible on palpation. it improves with rest and worsens with movement. He denies any fever, chills, cough, orthopnea, PND, LE edema, abdominal pain, diarrhea, or dysuria.    On the floor, vitals are WNL.  (03 Jul 2021 21:11)      71 yo M with PMhx of CAD s/p CABG (course at that time complicated by pleural effusion requiring chest tubes), ESRD on HD (MWF), DMII, HTN, afib on coumadin, and anemia presented from [facility] for evaluation of chest pain and GIB. Patient is AAOX2 and was able to provide some information. Most of the information was obtained from charting. Patient was admitted to [facility] after developing chest pain during HD. He was found to have Hgb of 6 and elevated trops concerning for GIB and NSTEM II. He was give 2 units of pRBCs and monitored off warfarin/aspirin. He underwent bowel prep but it did not show any bloody stool. As a result, no colonoscopy was performed. His course was complicated by aflutter/afib with RVR. He was managed with metoprolol and never required cardioversion. On day of transfer, his Hgb was in the 8s. Patient was deemed stable enough to be restarted on aspirin and warfarin. Patient was transferred to [facility] for further evaluation. Currently, patient have no symptoms but reports to have intermittent chest pain, that is suprasternal, non-radiating, exertional at times, and non-reproducible on palpation. it improves with rest and worsens with movement. He denies any fever, chills, cough, orthopnea, PND, LE edema, abdominal pain, diarrhea, or dysuria.    On the floor, vitals are WNL."    Neurology was consulted for 07/13 CTH showing foci of low density in the R anterolateral frontal lobe and L posterior temporal lobe concerning for acute infarcts. Patient's hospital course was complicated by cardiac arrest on 07/13 at 0155. ROSC achieved after 10 minutes, patient received Epi x3 and shock x2. Patient intubated and sedated on Propofol 50mcg and Ketamine 1.5mcg. Neuro exam significantly limited by sedation.      PAST MEDICAL & SURGICAL HISTORY:  HTN (Hypertension)    DM (Diabetes Mellitus)  x 4 yrs without N/N/R    CAD (Coronary Artery Disease)    Hypercholesterolemia    Coronary Stent  CABG in 2019    Heart Attack  2/1/07 with subsequent stent    H/O: CVA  after cardiac stent placed 12/15/09 -no residual    Hyperthyroidism    Stented coronary artery  total 15 stents from 0894-0881    ESRD (end stage renal disease) on dialysis  MWF    Boil of [facility]  2006 and 2008    S/P cataract extraction    Hemodialysis access, AV graft  5/2015, L arm    S/P CABG x 4        ROS: Negative except for HPI    MEDICATIONS:      diltiazem    Tablet 30 milliGRAM(s) Oral four times a day  norepinephrine Infusion 0.02 MICROgram(s)/kG/Min IV Continuous <Continuous>      acetaminophen   Tablet .. 1000 milliGRAM(s) Oral every 6 hours PRN  atorvastatin 40 milliGRAM(s) Oral at bedtime  calamine/zinc oxide Lotion 1 Application(s) Topical three times a day PRN  chlorhexidine 0.12% Liquid 15 milliLiter(s) Oral Mucosa every 12 hours  chlorhexidine 2% Cloths 1 Application(s) Topical daily  dextrose 40% Gel 15 Gram(s) Oral once  dextrose 40% Gel 15 Gram(s) Oral once  dextrose 5%. 1000 milliLiter(s) IV Continuous <Continuous>  dextrose 5%. 1000 milliLiter(s) IV Continuous <Continuous>  dextrose 50% Injectable 25 Gram(s) IV Push once  dextrose 50% Injectable 12.5 Gram(s) IV Push once  dextrose 50% Injectable 25 Gram(s) IV Push once  epoetin danilo-epbx (RETACRIT) Injectable 30035 Unit(s) IV Push <User Schedule>  glucagon  Injectable 1 milliGRAM(s) IntraMuscular once  insulin lispro (ADMELOG) corrective regimen sliding scale   SubCutaneous every 6 hours  pantoprazole  Injectable 40 milliGRAM(s) IV Push every 12 hours      Allergies    lisinopril (Other)  opioid-like analgesics (Other)    Intolerances        SOCIAL HISTORY: Denies tobacco, social ETOH, denies illicit drug use    FAMILY HISTORY:  Family history of diabetes mellitus (Sibling)    Family history of coronary artery disease        Vital Signs Last 24 Hrs  T(C): 37.3 (21 Jul 2021 12:00), Max: 37.7 (21 Jul 2021 04:00)  T(F): 99.1 (21 Jul 2021 12:00), Max: 99.8 (21 Jul 2021 04:00)  HR: 78 (21 Jul 2021 14:07) (69 - 86)  BP: 102/20 (21 Jul 2021 14:00) (77/48 - 130/39)  BP(mean): 39 (21 Jul 2021 14:00) (39 - 95)  RR: 23 (21 Jul 2021 14:00) (17 - 28)  SpO2: 94% (21 Jul 2021 14:07) (92% - 100%)    PHYSICAL EXAM:    Constitutional: NAD, intubated, alert   HEENT: PERRLA, EOMI  Respiratory: Unlabored   Cardiovascular: RRR  Extremities: No peripheral edema. Motor/sensation intact b/l LE   Vascular: 2+Fem/Pop bilaterally. B/l DP/PT signals.     LABS:                        7.6    12.54 )-----------( 252      ( 21 Jul 2021 03:27 )             25.1     07-21    134<L>  |  93<L>  |  32<H>  ----------------------------<  109<H>  4.1   |  23  |  4.75<H>    Ca    8.5      21 Jul 2021 03:27  Phos  4.5     07-21  Mg     2.00     07-21    TPro  5.3<L>  /  Alb  2.3<L>  /  TBili  1.3<H>  /  DBili  x   /  AST  38  /  ALT  8   /  AlkPhos  109  07-21    PTT - ( 21 Jul 2021 03:27 )  PTT:42.1 sec        RADIOLOGY & ADDITIONAL STUDIES:      EXAM:  CT ANGIO LWR EXT (W)AW IC BI        PROCEDURE DATE:  Jul 21 2021         INTERPRETATION:  CT ANGIOGRAM ABDOMEN, PELVIS, AND LOWER EXTREMITIES:    CLINICAL INFORMATION: Lower extremity swelling, on heparin drip. Evaluate for hemorrhage. Bacteremia.    COMPARISON: 7/13/2021.    CONTRAST/COMPLICATIONS:  IV Contrast: Omnipaque 350  90 cc administered   10 cc discarded  Oral Contrast: NONE  Complications: None reported at time of study completion    PROCEDURE:  Initially, nonenhanced CT was obtained through the calves. Then, following the rapid administration of intravenous contrast, CT angiography was performed through the abdomen, pelvis, and lower extremities down to the toes.  Delayed images through the calves were also obtained. Sagittal and coronal reformats as well as 3D reconstructions were performed.    FINDINGS:    CENTRAL ARTERIAL SYSTEM:    Marked atherosclerotic calcifications of the aortoiliac system and of the aortic branch vessels. The branch vessels are patent.    RIGHT LOWER EXTREMITY: Small knee joint effusion, marked subcutaneous edema. Surgical clips medially in the right leg. A 10.5 cm hematoma with fluid and hemorrhage level within the adductor muscles. No evidence of active extravasation.    Multilevel mildto moderate stenosis of the femoral and popliteal arteries. The arteries of the lower leg are not well evaluated due to extensive calcifications. Tibioperoneal trunk is patent.    LEFT LOWER EXTREMITY: Small knee joint effusion, marked subcutaneous edema.    Multilevel mild to moderate stenosis of the femoral and popliteal arteries. The arteries of the lower leg are not well evaluated due to extensive calcifications. Tibioperoneal trunk is patent. Stent in the distal femoral artery.    ADDITIONAL FINDINGS: Reflux of contrast into distended IVC and hepatic veins. Enteric catheter in mid gastric lumen. Marked anasarca. Small-to-moderate ascites. Atrophic kidneys.    IMPRESSION:  A moderate-sized hematoma in the right adductor muscle group. No active extravasation on arterial phase.  Multilevel mild to moderate stenosis of the femoral and popliteal arteries bilaterally. HPI:   69 yo M with PMhx of CAD s/p CABG (course at that time complicated by pleural effusion requiring chest tubes), ESRD on HD (MWF), DMII, HTN, afib on coumadin, and anemia presented from [facility] for evaluation of chest pain and GIB. Patient is AAOX2 and was able to provide some information. Most of the information was obtained from charting. Patient was admitted to [facility] after developing chest pain during HD. He was found to have Hgb of 6 in setting of +FOBT and elevated trops concerning for GIB and NSTEM II. He was give 2 units of pRBCs and monitored off warfarin/aspirin. He underwent bowel prep but it did not show any bloody stool. As a result, no colonoscopy was performed. His course was complicated by aflutter/afib with RVR. He was managed with metoprolol and never required cardioversion. On day of transfer, his Hgb was in the 8s. Patient was deemed stable enough to be restarted on aspirin and warfarin. Patient was transferred to [facility] for further evaluation. Since admission to [facility] pt without evidence of GIB. Course has been c/b cardiac arrest on 07/13 at 0155. ROSC achieved after 10 minutes, patient received Epi x3 and shock x2. Patient intubated and CTH showing foci of low density in the R anterolateral frontal lobe and L posterior temporal lobe concerning for acute infarcts. He was also found to have left pleural effusion s/p chest tube (now removed) and sepsis with MRSA bacteremia requiring pressor support. Most recent pt found with vegetations on TRUMAN. He currently remains intubated and pressors decreasing. 2 days ago pt complaining of right thigh pain and CT with evidence of 10cm hematoma (no active extrav). He has required 2 units PRBC over last 48hrs, pressor requirements downtrending.     Of note patient previously followed by Dr. Bowen for LLE non-healing wound s/p stent placement and left AVF for HD.       PAST MEDICAL & SURGICAL HISTORY:  HTN (Hypertension)    DM (Diabetes Mellitus)  x 4 yrs without N/N/R    CAD (Coronary Artery Disease)    Hypercholesterolemia    Coronary Stent  CABG in 2019    Heart Attack  2/1/07 with subsequent stent    H/O: CVA  after cardiac stent placed 12/15/09 -no residual    Hyperthyroidism    Stented coronary artery  total 15 stents from 0182-1893    ESRD (end stage renal disease) on dialysis  MWF    Boil of [place]  2006 and 2008    S/P cataract extraction    Hemodialysis access, AV graft  5/2015, L arm    S/P CABG x 4        ROS: Negative except for HPI    MEDICATIONS:      diltiazem    Tablet 30 milliGRAM(s) Oral four times a day  norepinephrine Infusion 0.02 MICROgram(s)/kG/Min IV Continuous <Continuous>      acetaminophen   Tablet .. 1000 milliGRAM(s) Oral every 6 hours PRN  atorvastatin 40 milliGRAM(s) Oral at bedtime  calamine/zinc oxide Lotion 1 Application(s) Topical three times a day PRN  chlorhexidine 0.12% Liquid 15 milliLiter(s) Oral Mucosa every 12 hours  chlorhexidine 2% Cloths 1 Application(s) Topical daily  dextrose 40% Gel 15 Gram(s) Oral once  dextrose 40% Gel 15 Gram(s) Oral once  dextrose 5%. 1000 milliLiter(s) IV Continuous <Continuous>  dextrose 5%. 1000 milliLiter(s) IV Continuous <Continuous>  dextrose 50% Injectable 25 Gram(s) IV Push once  dextrose 50% Injectable 12.5 Gram(s) IV Push once  dextrose 50% Injectable 25 Gram(s) IV Push once  epoetin danilo-epbx (RETACRIT) Injectable 53502 Unit(s) IV Push <User Schedule>  glucagon  Injectable 1 milliGRAM(s) IntraMuscular once  insulin lispro (ADMELOG) corrective regimen sliding scale   SubCutaneous every 6 hours  pantoprazole  Injectable 40 milliGRAM(s) IV Push every 12 hours      Allergies    lisinopril (Other)  opioid-like analgesics (Other)    Intolerances        SOCIAL HISTORY: Denies tobacco, social ETOH, denies illicit drug use    FAMILY HISTORY:  Family history of diabetes mellitus (Sibling)    Family history of coronary artery disease        Vital Signs Last 24 Hrs  T(C): 37.3 (21 Jul 2021 12:00), Max: 37.7 (21 Jul 2021 04:00)  T(F): 99.1 (21 Jul 2021 12:00), Max: 99.8 (21 Jul 2021 04:00)  HR: 78 (21 Jul 2021 14:07) (69 - 86)  BP: 102/20 (21 Jul 2021 14:00) (77/48 - 130/39)  BP(mean): 39 (21 Jul 2021 14:00) (39 - 95)  RR: 23 (21 Jul 2021 14:00) (17 - 28)  SpO2: 94% (21 Jul 2021 14:07) (92% - 100%)    PHYSICAL EXAM:    Constitutional: NAD, intubated, alert   HEENT: PERRLA, EOMI  Respiratory: Unlabored   Cardiovascular: RRR  Extremities: No peripheral edema. Motor/sensation intact b/l LE   Vascular: 2+Fem/Pop bilaterally. B/l DP/PT signals.     LABS:                        7.6    12.54 )-----------( 252      ( 21 Jul 2021 03:27 )             25.1     07-21    134<L>  |  93<L>  |  32<H>  ----------------------------<  109<H>  4.1   |  23  |  4.75<H>    Ca    8.5      21 Jul 2021 03:27  Phos  4.5     07-21  Mg     2.00     07-21    TPro  5.3<L>  /  Alb  2.3<L>  /  TBili  1.3<H>  /  DBili  x   /  AST  38  /  ALT  8   /  AlkPhos  109  07-21    PTT - ( 21 Jul 2021 03:27 )  PTT:42.1 sec        RADIOLOGY & ADDITIONAL STUDIES:      EXAM:  CT ANGIO LWR EXT (W)AW IC BI        PROCEDURE DATE:  Jul 21 2021         INTERPRETATION:  CT ANGIOGRAM ABDOMEN, PELVIS, AND LOWER EXTREMITIES:    CLINICAL INFORMATION: Lower extremity swelling, on heparin drip. Evaluate for hemorrhage. Bacteremia.    COMPARISON: 7/13/2021.    CONTRAST/COMPLICATIONS:  IV Contrast: Omnipaque 350  90 cc administered   10 cc discarded  Oral Contrast: NONE  Complications: None reported at time of study completion    PROCEDURE:  Initially, nonenhanced CT was obtained through the calves. Then, following the rapid administration of intravenous contrast, CT angiography was performed through the abdomen, pelvis, and lower extremities down to the toes.  Delayed images through the calves were also obtained. Sagittal and coronal reformats as well as 3D reconstructions were performed.    FINDINGS:    CENTRAL ARTERIAL SYSTEM:    Marked atherosclerotic calcifications of the aortoiliac system and of the aortic branch vessels. The branch vessels are patent.    RIGHT LOWER EXTREMITY: Small knee joint effusion, marked subcutaneous edema. Surgical clips medially in the right leg. A 10.5 cm hematoma with fluid and hemorrhage level within the adductor muscles. No evidence of active extravasation.    Multilevel mildto moderate stenosis of the femoral and popliteal arteries. The arteries of the lower leg are not well evaluated due to extensive calcifications. Tibioperoneal trunk is patent.    LEFT LOWER EXTREMITY: Small knee joint effusion, marked subcutaneous edema.    Multilevel mild to moderate stenosis of the femoral and popliteal arteries. The arteries of the lower leg are not well evaluated due to extensive calcifications. Tibioperoneal trunk is patent. Stent in the distal femoral artery.    ADDITIONAL FINDINGS: Reflux of contrast into distended IVC and hepatic veins. Enteric catheter in mid gastric lumen. Marked anasarca. Small-to-moderate ascites. Atrophic kidneys.    IMPRESSION:  A moderate-sized hematoma in the right adductor muscle group. No active extravasation on arterial phase.  Multilevel mild to moderate stenosis of the femoral and popliteal arteries bilaterally.

## 2021-07-21 NOTE — PROGRESS NOTE ADULT - ASSESSMENT
70 year old male with Anemia    Anemia  negative occult x 2; without overt gi bleeding  Monitor CBC and Keep hemoglobin > 8 given cardiac issues   Continue PPI BID w/Maalox PRN    No GI objection to ASA or Heparin gtt while continuing PPI therapy  f/u CTA   will continue to monitor closely     Ascites  2/2 RHF w/dilated IVC on CT   diuresis per cardiology     Endocarditis  TRUMAN with Large Vegetation  management per ID recs     CVA, Embolic   care per neurology, cardiology and micu appreciated     Cardiac Arrest  Care per cardiology and MICU appreciated     Advanced care planning was discussed with family.  Advanced care planning forms were reviewed and discussed.  Risks, benefits and alternatives of gastroenterologic procedures were discussed in detail and all questions were answered.  30 minutes spent.

## 2021-07-21 NOTE — PROGRESS NOTE ADULT - SUBJECTIVE AND OBJECTIVE BOX
Nephrology Followup Note - [phone] - Dr Mendiola / Dr Knight / Dr Macias / Dr Arroyo / Dr Mackey / Dr Bowen / Dr Esposito / Dr Calle  Pt seen and examined at bedside  No acute events overnight., Pt with eyes open, intubated, appears comfortable.       Allergies:  lisinopril (Other)  opioid-like analgesics (Other)    Hospital Medications:   MEDICATIONS  (STANDING):  atorvastatin 40 milliGRAM(s) Oral at bedtime  chlorhexidine 0.12% Liquid 15 milliLiter(s) Oral Mucosa every 12 hours  chlorhexidine 2% Cloths 1 Application(s) Topical daily  dextrose 40% Gel 15 Gram(s) Oral once  dextrose 40% Gel 15 Gram(s) Oral once  dextrose 5%. 1000 milliLiter(s) (50 mL/Hr) IV Continuous <Continuous>  dextrose 5%. 1000 milliLiter(s) (100 mL/Hr) IV Continuous <Continuous>  dextrose 50% Injectable 25 Gram(s) IV Push once  dextrose 50% Injectable 12.5 Gram(s) IV Push once  dextrose 50% Injectable 25 Gram(s) IV Push once  diltiazem    Tablet 30 milliGRAM(s) Oral four times a day  epoetin danilo-epbx (RETACRIT) Injectable 52863 Unit(s) IV Push <User Schedule>  glucagon  Injectable 1 milliGRAM(s) IntraMuscular once  insulin lispro (ADMELOG) corrective regimen sliding scale   SubCutaneous every 6 hours  midazolam Injectable 4 milliGRAM(s) IV Push once  norepinephrine Infusion 0.02 MICROgram(s)/kG/Min (1.62 mL/Hr) IV Continuous <Continuous>  pantoprazole  Injectable 40 milliGRAM(s) IV Push every 12 hours      VITALS:  T(F): 99.4 (07-21-21 @ 08:00), Max: 99.8 (07-21-21 @ 04:00)  HR: 78 (07-21-21 @ 11:08)  BP: 114/35 (07-21-21 @ 10:00)  RR: 18 (07-21-21 @ 10:00)  SpO2: 97% (07-21-21 @ 11:08)  Wt(kg): --    07-20 @ 07:01  -  07-21 @ 07:00  --------------------------------------------------------  IN: 617.4 mL / OUT: 0 mL / NET: 617.4 mL    07-21 @ 07:01  -  07-21 @ 11:40  --------------------------------------------------------  IN: 16 mL / OUT: 0 mL / NET: 16 mL        PHYSICAL EXAM:  Constitutional: NAD  HEENT: ETT   Neck: No JVD  Respiratory: CTAB, no wheezes, rales or rhonchi  Cardiovascular: S1, S2, RRR  Gastrointestinal: BS+, soft, NT/ND  Extremities: No cyanosis or clubbing. +peripheral edema  Neurological: awake  : No CVA tenderness. No wagner.   Skin: No rashes  Vascular Access: LUE AV access +thrill and bruit.     LABS:  07-21    134<L>  |  93<L>  |  32<H>  ----------------------------<  109<H>  4.1   |  23  |  4.75<H>    Ca    8.5      21 Jul 2021 03:27  Phos  4.5     07-21  Mg     2.00     07-21    TPro  5.3<L>  /  Alb  2.3<L>  /  TBili  1.3<H>  /  DBili      /  AST  38  /  ALT  8   /  AlkPhos  109  07-21    Creatinine Trend: 4.75 <--, 6.78 <--, 5.79 <--, 5.45 <--, 4.43 <--, 6.22 <--, 4.99 <--, 6.40 <--                        7.6    12.54 )-----------( 252      ( 21 Jul 2021 03:27 )             25.1     Urine Studies:      RADIOLOGY & ADDITIONAL STUDIES:

## 2021-07-22 LAB
ALBUMIN SERPL ELPH-MCNC: 2.3 G/DL — LOW (ref 3.3–5)
ALP SERPL-CCNC: 115 U/L — SIGNIFICANT CHANGE UP (ref 40–120)
ALT FLD-CCNC: 8 U/L — SIGNIFICANT CHANGE UP (ref 4–41)
ANION GAP SERPL CALC-SCNC: 18 MMOL/L — HIGH (ref 7–14)
ANISOCYTOSIS BLD QL: SLIGHT — SIGNIFICANT CHANGE UP
APTT BLD: 35.6 SEC — SIGNIFICANT CHANGE UP (ref 27–36.3)
AST SERPL-CCNC: 33 U/L — SIGNIFICANT CHANGE UP (ref 4–40)
BASOPHILS # BLD AUTO: 0 K/UL — SIGNIFICANT CHANGE UP (ref 0–0.2)
BASOPHILS # BLD AUTO: 0.05 K/UL — SIGNIFICANT CHANGE UP (ref 0–0.2)
BASOPHILS NFR BLD AUTO: 0 % — SIGNIFICANT CHANGE UP (ref 0–2)
BASOPHILS NFR BLD AUTO: 0.3 % — SIGNIFICANT CHANGE UP (ref 0–2)
BILIRUB SERPL-MCNC: 0.8 MG/DL — SIGNIFICANT CHANGE UP (ref 0.2–1.2)
BUN SERPL-MCNC: 42 MG/DL — HIGH (ref 7–23)
CALCIUM SERPL-MCNC: 8.5 MG/DL — SIGNIFICANT CHANGE UP (ref 8.4–10.5)
CHLORIDE SERPL-SCNC: 94 MMOL/L — LOW (ref 98–107)
CK SERPL-CCNC: 610 U/L — HIGH (ref 30–200)
CO2 SERPL-SCNC: 22 MMOL/L — SIGNIFICANT CHANGE UP (ref 22–31)
CREAT SERPL-MCNC: 5.9 MG/DL — HIGH (ref 0.5–1.3)
ELLIPTOCYTES BLD QL SMEAR: SLIGHT — SIGNIFICANT CHANGE UP
EOSINOPHIL # BLD AUTO: 0 K/UL — SIGNIFICANT CHANGE UP (ref 0–0.5)
EOSINOPHIL # BLD AUTO: 0.01 K/UL — SIGNIFICANT CHANGE UP (ref 0–0.5)
EOSINOPHIL NFR BLD AUTO: 0 % — SIGNIFICANT CHANGE UP (ref 0–6)
EOSINOPHIL NFR BLD AUTO: 0.1 % — SIGNIFICANT CHANGE UP (ref 0–6)
GIANT PLATELETS BLD QL SMEAR: PRESENT — SIGNIFICANT CHANGE UP
GLUCOSE BLDC GLUCOMTR-MCNC: 116 MG/DL — HIGH (ref 70–99)
GLUCOSE BLDC GLUCOMTR-MCNC: 137 MG/DL — HIGH (ref 70–99)
GLUCOSE BLDC GLUCOMTR-MCNC: 139 MG/DL — HIGH (ref 70–99)
GLUCOSE BLDC GLUCOMTR-MCNC: 159 MG/DL — HIGH (ref 70–99)
GLUCOSE SERPL-MCNC: 99 MG/DL — SIGNIFICANT CHANGE UP (ref 70–99)
HCT VFR BLD CALC: 22.1 % — LOW (ref 39–50)
HCT VFR BLD CALC: 25.2 % — LOW (ref 39–50)
HCT VFR BLD CALC: 25.6 % — LOW (ref 39–50)
HGB BLD-MCNC: 6.6 G/DL — CRITICAL LOW (ref 13–17)
HGB BLD-MCNC: 7.7 G/DL — LOW (ref 13–17)
HGB BLD-MCNC: 7.8 G/DL — LOW (ref 13–17)
IANC: 12.51 K/UL — HIGH (ref 1.5–8.5)
IANC: 17 K/UL — HIGH (ref 1.5–8.5)
IMM GRANULOCYTES NFR BLD AUTO: 2 % — HIGH (ref 0–1.5)
LYMPHOCYTES # BLD AUTO: 0.13 K/UL — LOW (ref 1–3.3)
LYMPHOCYTES # BLD AUTO: 0.51 K/UL — LOW (ref 1–3.3)
LYMPHOCYTES # BLD AUTO: 0.9 % — LOW (ref 13–44)
LYMPHOCYTES # BLD AUTO: 2.6 % — LOW (ref 13–44)
MACROCYTES BLD QL: SLIGHT — SIGNIFICANT CHANGE UP
MAGNESIUM SERPL-MCNC: 1.9 MG/DL — SIGNIFICANT CHANGE UP (ref 1.6–2.6)
MANUAL SMEAR VERIFICATION: SIGNIFICANT CHANGE UP
MCHC RBC-ENTMCNC: 27.4 PG — SIGNIFICANT CHANGE UP (ref 27–34)
MCHC RBC-ENTMCNC: 28.1 PG — SIGNIFICANT CHANGE UP (ref 27–34)
MCHC RBC-ENTMCNC: 28.1 PG — SIGNIFICANT CHANGE UP (ref 27–34)
MCHC RBC-ENTMCNC: 29.9 GM/DL — LOW (ref 32–36)
MCHC RBC-ENTMCNC: 30.5 GM/DL — LOW (ref 32–36)
MCHC RBC-ENTMCNC: 30.6 GM/DL — LOW (ref 32–36)
MCV RBC AUTO: 89.8 FL — SIGNIFICANT CHANGE UP (ref 80–100)
MCV RBC AUTO: 92 FL — SIGNIFICANT CHANGE UP (ref 80–100)
MCV RBC AUTO: 94 FL — SIGNIFICANT CHANGE UP (ref 80–100)
MONOCYTES # BLD AUTO: 0.88 K/UL — SIGNIFICANT CHANGE UP (ref 0–0.9)
MONOCYTES # BLD AUTO: 2.04 K/UL — HIGH (ref 0–0.9)
MONOCYTES NFR BLD AUTO: 10.2 % — SIGNIFICANT CHANGE UP (ref 2–14)
MONOCYTES NFR BLD AUTO: 6.1 % — SIGNIFICANT CHANGE UP (ref 2–14)
NEUTROPHILS # BLD AUTO: 13.31 K/UL — HIGH (ref 1.8–7.4)
NEUTROPHILS # BLD AUTO: 17 K/UL — HIGH (ref 1.8–7.4)
NEUTROPHILS NFR BLD AUTO: 84.8 % — HIGH (ref 43–77)
NEUTROPHILS NFR BLD AUTO: 90.4 % — HIGH (ref 43–77)
NEUTS BAND # BLD: 1.7 % — SIGNIFICANT CHANGE UP (ref 0–6)
NRBC # BLD: 0 /100 WBCS — SIGNIFICANT CHANGE UP
NRBC # BLD: 0 /100 WBCS — SIGNIFICANT CHANGE UP
NRBC # FLD: 0.02 K/UL — HIGH
NRBC # FLD: 0.04 K/UL — HIGH
OVALOCYTES BLD QL SMEAR: SLIGHT — SIGNIFICANT CHANGE UP
PHOSPHATE SERPL-MCNC: 5.6 MG/DL — HIGH (ref 2.5–4.5)
PLAT MORPH BLD: NORMAL — SIGNIFICANT CHANGE UP
PLATELET # BLD AUTO: 251 K/UL — SIGNIFICANT CHANGE UP (ref 150–400)
PLATELET # BLD AUTO: 312 K/UL — SIGNIFICANT CHANGE UP (ref 150–400)
PLATELET # BLD AUTO: 341 K/UL — SIGNIFICANT CHANGE UP (ref 150–400)
PLATELET COUNT - ESTIMATE: NORMAL — SIGNIFICANT CHANGE UP
POIKILOCYTOSIS BLD QL AUTO: SLIGHT — SIGNIFICANT CHANGE UP
POLYCHROMASIA BLD QL SMEAR: SLIGHT — SIGNIFICANT CHANGE UP
POTASSIUM SERPL-MCNC: 4.6 MMOL/L — SIGNIFICANT CHANGE UP (ref 3.5–5.3)
POTASSIUM SERPL-SCNC: 4.6 MMOL/L — SIGNIFICANT CHANGE UP (ref 3.5–5.3)
PROT SERPL-MCNC: 5.1 G/DL — LOW (ref 6–8.3)
RBC # BLD: 2.35 M/UL — LOW (ref 4.2–5.8)
RBC # BLD: 2.74 M/UL — LOW (ref 4.2–5.8)
RBC # BLD: 2.85 M/UL — LOW (ref 4.2–5.8)
RBC # FLD: 19.9 % — HIGH (ref 10.3–14.5)
RBC # FLD: 20.2 % — HIGH (ref 10.3–14.5)
RBC # FLD: 21.5 % — HIGH (ref 10.3–14.5)
RBC BLD AUTO: ABNORMAL
SODIUM SERPL-SCNC: 134 MMOL/L — LOW (ref 135–145)
VANCOMYCIN FLD-MCNC: 21.8 UG/ML — SIGNIFICANT CHANGE UP
VARIANT LYMPHS # BLD: 0.9 % — SIGNIFICANT CHANGE UP (ref 0–6)
WBC # BLD: 14.45 K/UL — HIGH (ref 3.8–10.5)
WBC # BLD: 20 K/UL — HIGH (ref 3.8–10.5)
WBC # BLD: 20.86 K/UL — HIGH (ref 3.8–10.5)
WBC # FLD AUTO: 14.45 K/UL — HIGH (ref 3.8–10.5)
WBC # FLD AUTO: 20 K/UL — HIGH (ref 3.8–10.5)
WBC # FLD AUTO: 20.86 K/UL — HIGH (ref 3.8–10.5)

## 2021-07-22 PROCEDURE — 76870 US EXAM SCROTUM: CPT | Mod: 26

## 2021-07-22 PROCEDURE — 99232 SBSQ HOSP IP/OBS MODERATE 35: CPT

## 2021-07-22 PROCEDURE — 99291 CRITICAL CARE FIRST HOUR: CPT

## 2021-07-22 RX ORDER — MIDAZOLAM HYDROCHLORIDE 1 MG/ML
2 INJECTION, SOLUTION INTRAMUSCULAR; INTRAVENOUS ONCE
Refills: 0 | Status: DISCONTINUED | OUTPATIENT
Start: 2021-07-22 | End: 2021-07-22

## 2021-07-22 RX ORDER — VANCOMYCIN HCL 1 G
500 VIAL (EA) INTRAVENOUS ONCE
Refills: 0 | Status: COMPLETED | OUTPATIENT
Start: 2021-07-22 | End: 2021-07-22

## 2021-07-22 RX ORDER — DEXMEDETOMIDINE HYDROCHLORIDE IN 0.9% SODIUM CHLORIDE 4 UG/ML
0.2 INJECTION INTRAVENOUS
Qty: 400 | Refills: 0 | Status: DISCONTINUED | OUTPATIENT
Start: 2021-07-22 | End: 2021-07-23

## 2021-07-22 RX ORDER — PANTOPRAZOLE SODIUM 20 MG/1
40 TABLET, DELAYED RELEASE ORAL DAILY
Refills: 0 | Status: DISCONTINUED | OUTPATIENT
Start: 2021-07-23 | End: 2021-07-24

## 2021-07-22 RX ORDER — DILTIAZEM HCL 120 MG
30 CAPSULE, EXT RELEASE 24 HR ORAL
Refills: 0 | Status: DISCONTINUED | OUTPATIENT
Start: 2021-07-22 | End: 2021-07-29

## 2021-07-22 RX ADMIN — DEXMEDETOMIDINE HYDROCHLORIDE IN 0.9% SODIUM CHLORIDE 4.31 MICROGRAM(S)/KG/HR: 4 INJECTION INTRAVENOUS at 05:34

## 2021-07-22 RX ADMIN — Medication 1000 MILLIGRAM(S): at 02:15

## 2021-07-22 RX ADMIN — CHLORHEXIDINE GLUCONATE 15 MILLILITER(S): 213 SOLUTION TOPICAL at 05:11

## 2021-07-22 RX ADMIN — Medication 100 MILLIGRAM(S): at 22:12

## 2021-07-22 RX ADMIN — CHLORHEXIDINE GLUCONATE 15 MILLILITER(S): 213 SOLUTION TOPICAL at 18:07

## 2021-07-22 RX ADMIN — DEXMEDETOMIDINE HYDROCHLORIDE IN 0.9% SODIUM CHLORIDE 4.31 MICROGRAM(S)/KG/HR: 4 INJECTION INTRAVENOUS at 09:37

## 2021-07-22 RX ADMIN — Medication 30 MILLIGRAM(S): at 12:26

## 2021-07-22 RX ADMIN — ERYTHROPOIETIN 20000 UNIT(S): 10000 INJECTION, SOLUTION INTRAVENOUS; SUBCUTANEOUS at 18:28

## 2021-07-22 RX ADMIN — CHLORHEXIDINE GLUCONATE 1 APPLICATION(S): 213 SOLUTION TOPICAL at 13:23

## 2021-07-22 RX ADMIN — MIDAZOLAM HYDROCHLORIDE 2 MILLIGRAM(S): 1 INJECTION, SOLUTION INTRAMUSCULAR; INTRAVENOUS at 03:15

## 2021-07-22 RX ADMIN — Medication 1000 MILLIGRAM(S): at 00:23

## 2021-07-22 RX ADMIN — Medication 1.62 MICROGRAM(S)/KG/MIN: at 09:37

## 2021-07-22 RX ADMIN — Medication 30 MILLIGRAM(S): at 05:11

## 2021-07-22 RX ADMIN — ATORVASTATIN CALCIUM 40 MILLIGRAM(S): 80 TABLET, FILM COATED ORAL at 23:04

## 2021-07-22 RX ADMIN — PANTOPRAZOLE SODIUM 40 MILLIGRAM(S): 20 TABLET, DELAYED RELEASE ORAL at 05:11

## 2021-07-22 RX ADMIN — Medication 1: at 12:26

## 2021-07-22 NOTE — PROGRESS NOTE ADULT - SUBJECTIVE AND OBJECTIVE BOX
Date of Service: 07-22-21 @ 13:36    Patient is a 70y old  Male who presents with a chief complaint of Chest pain and GIB (22 Jul 2021 12:57)    Any change in ROS:   Resting comfortably, remains intubated on levo gtt  O2 sats 100% on full vent support     MEDICATIONS  (STANDING):  atorvastatin 40 milliGRAM(s) Oral at bedtime  chlorhexidine 0.12% Liquid 15 milliLiter(s) Oral Mucosa every 12 hours  chlorhexidine 2% Cloths 1 Application(s) Topical daily  dexMEDEtomidine Infusion 0.2 MICROgram(s)/kG/Hr (4.31 mL/Hr) IV Continuous <Continuous>  dextrose 40% Gel 15 Gram(s) Oral once  dextrose 40% Gel 15 Gram(s) Oral once  dextrose 5%. 1000 milliLiter(s) (50 mL/Hr) IV Continuous <Continuous>  dextrose 5%. 1000 milliLiter(s) (100 mL/Hr) IV Continuous <Continuous>  dextrose 50% Injectable 25 Gram(s) IV Push once  dextrose 50% Injectable 12.5 Gram(s) IV Push once  dextrose 50% Injectable 25 Gram(s) IV Push once  diltiazem    Tablet 30 milliGRAM(s) Oral four times a day  epoetin danilo-epbx (RETACRIT) Injectable 97154 Unit(s) IV Push <User Schedule>  glucagon  Injectable 1 milliGRAM(s) IntraMuscular once  insulin lispro (ADMELOG) corrective regimen sliding scale   SubCutaneous every 6 hours  norepinephrine Infusion 0.02 MICROgram(s)/kG/Min (1.62 mL/Hr) IV Continuous <Continuous>    MEDICATIONS  (PRN):  acetaminophen   Tablet .. 1000 milliGRAM(s) Oral every 6 hours PRN Temp greater or equal to 38C (100.4F), Mild Pain (1 - 3), Moderate Pain (4 - 6)  calamine/zinc oxide Lotion 1 Application(s) Topical three times a day PRN Itching    Vital Signs Last 24 Hrs  T(C): 37.4 (22 Jul 2021 12:00), Max: 38.6 (22 Jul 2021 00:00)  T(F): 99.3 (22 Jul 2021 12:00), Max: 101.5 (22 Jul 2021 00:00)  HR: 59 (22 Jul 2021 13:00) (55 - 108)  BP: 106/29 (22 Jul 2021 13:00) (85/25 - 153/56)  BP(mean): 46 (22 Jul 2021 13:00) (39 - 98)  RR: 18 (22 Jul 2021 13:00) (14 - 27)  SpO2: 98% (22 Jul 2021 13:00) (94% - 100%)  Mode: AC/ CMV (Assist Control/ Continuous Mandatory Ventilation)  RR (machine): 14  TV (machine): 400  FiO2: 40  PEEP: 5  ITime: 0.75  MAP: 10  PIP: 25    I&O's Summary    21 Jul 2021 07:01  -  22 Jul 2021 07:00  --------------------------------------------------------  IN: 265.4 mL / OUT: 0 mL / NET: 265.4 mL    22 Jul 2021 07:01  -  22 Jul 2021 13:36  --------------------------------------------------------  IN: 204.6 mL / OUT: 0 mL / NET: 204.6 mL    Physical Exam:   GENERAL: NAD  HEENT: KATHY  ENMT: No tonsillar erythema, exudates, or enlargement  NECK: Supple, No JVD  CHEST/LUNG: Coarse BS b/l  CVS: Regular rate and rhythm  GI: : Soft, Nontender, Nondistended  NERVOUS SYSTEM:  Alert, + commands  EXTREMITIES:  2+ Peripheral Pulses, generalized edema, R thigh tightness per RN   LYMPH: No lymphadenopathy noted  SKIN: No rashes or lesions  ENDOCRINOLOGY: No Thyromegaly  PSYCH: Appropriate    Labs:    CARDIAC MARKERS ( 22 Jul 2021 02:39 )  x     / x     / 610 U/L / x     / x      CARDIAC MARKERS ( 21 Jul 2021 18:08 )  x     / x     / 633 U/L / x     / x                                7.7    20.00 )-----------( 312      ( 22 Jul 2021 06:52 )             25.2                         6.6    14.45 )-----------( 251      ( 22 Jul 2021 02:39 )             22.1                         7.1    14.58 )-----------( 275      ( 21 Jul 2021 18:08 )             24.0                         7.6    12.54 )-----------( 252      ( 21 Jul 2021 03:27 )             25.1                         6.4    10.46 )-----------( 189      ( 20 Jul 2021 21:23 )             21.6                         7.6    13.29 )-----------( 223      ( 20 Jul 2021 03:11 )             25.9                         6.8    14.29 )-----------( 219      ( 19 Jul 2021 21:15 )             23.4                         7.2    12.15 )-----------( 150      ( 19 Jul 2021 05:30 )             25.0     07-22    134<L>  |  94<L>  |  42<H>  ----------------------------<  99  4.6   |  22  |  5.90<H>  07-21    134<L>  |  93<L>  |  32<H>  ----------------------------<  109<H>  4.1   |  23  |  4.75<H>  07-20    133<L>  |  92<L>  |  47<H>  ----------------------------<  129<H>  4.5   |  23  |  6.78<H>  07-19    135  |  96<L>  |  38<H>  ----------------------------<  161<H>  4.0   |  23  |  5.79<H>  07-18    134<L>  |  96<L>  |  36<H>  ----------------------------<  177<H>  3.7   |  24  |  5.45<H>    Ca    8.5      22 Jul 2021 02:39  Ca    8.5      21 Jul 2021 03:27  Phos  5.6     07-22  Phos  4.5     07-21  Mg     1.90     07-22  Mg     2.00     07-21    TPro  5.1<L>  /  Alb  2.3<L>  /  TBili  0.8  /  DBili  x   /  AST  33  /  ALT  8   /  AlkPhos  115  07-22  TPro  5.3<L>  /  Alb  2.3<L>  /  TBili  1.3<H>  /  DBili  x   /  AST  38  /  ALT  8   /  AlkPhos  109  07-21  TPro  5.2<L>  /  Alb  2.3<L>  /  TBili  1.0  /  DBili  x   /  AST  26  /  ALT  6   /  AlkPhos  94  07-20  TPro  4.9<L>  /  Alb  2.1<L>  /  TBili  0.4  /  DBili  x   /  AST  21  /  ALT  6   /  AlkPhos  88  07-19  TPro  5.3<L>  /  Alb  2.3<L>  /  TBili  0.4  /  DBili  x   /  AST  24  /  ALT  6   /  AlkPhos  111  07-18    CAPILLARY BLOOD GLUCOSE  POCT Blood Glucose.: 159 mg/dL (22 Jul 2021 12:23)  POCT Blood Glucose.: 116 mg/dL (22 Jul 2021 05:08)  POCT Blood Glucose.: 118 mg/dL (21 Jul 2021 23:35)  POCT Blood Glucose.: 130 mg/dL (21 Jul 2021 17:19)      LIVER FUNCTIONS - ( 22 Jul 2021 02:39 )  Alb: 2.3 g/dL / Pro: 5.1 g/dL / ALK PHOS: 115 U/L / ALT: 8 U/L / AST: 33 U/L / GGT: x           PT/INR - ( 21 Jul 2021 16:27 )   PT: 15.4 sec;   INR: 1.36 ratio    PTT - ( 22 Jul 2021 02:39 )  PTT:35.6 sec      Studies  Chest X-RAY  CT SCAN Chest  ct< from: CT Chest w/ IV Cont (07.13.21 @ 18:36) >  FINDINGS:  CHEST:  LUNGS, LARGE AIRWAYS, AND PLEURA: Minimal secretions within the distal trachea and right main stem bronchus. Endotracheal tube is in place. Small bilateral pleural effusions,,partially loculated, with adjacent passive atelectasis. Vascular congestion and groundglass opacities.  VESSELS: Left IJ approach central venous catheter with the tip in the left brachiocephalic vein. Air is seen within the left brachiocephalic, likely secondary to injections. Left axillary vein stent is patent. Calcifications of the aortic arch and coronary vessels. CABG.  HEART: Cardiomegaly. Left ventricular free wall fatty replacement, likely related to prior myocardial infarction. No pericardial effusion. Retained epicardial pacemaker leads.  MEDIASTINUM AND YOANA: No lymphadenopathy.  CHEST WALL AND LOWER NECK: Enlarged thyroid gland with multiple hypodense nodules bilaterally, the right containing calcifications, appear unchanged. Median sternotomy.  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  BILE DUCTS: Normal caliber.  GALLBLADDER: Within normal limits.  SPLEEN: Within normal limits.  PANCREAS: Within normal limits.  ADRENALS: Within normal limits.  KIDNEYS/URETERS: Atrophic bilateral kidneys. No hydronephrosis. Exophytic 1.3 cm cyst arising from the left kidney.    BLADDER: Decompressed.  REPRODUCTIVE ORGANS: Prostate within normal limits. Bilateral hydroceles.    BOWEL: No bowel obstruction. Appendix is normal. Enteric tube is present with the tip in stomach.  PERITONEUM: Mild to moderate ascites in the perihepatic and perisplenic regions, tracking along the paracolic gutters. Trace ascites in the pelvis.  VESSELS: Atherosclerotic changes.  RETROPERITONEUM/LYMPH NODES: No lymphadenopathy.  ABDOMINAL WALL: Diffuse subcutaneous edema.  BONES: Degenerative changes.    IMPRESSION:  *  Mild pulmonary edema with bilateral partially loculated pleural effusions with adjacent passive atelectasis.  *  Debris within the right mainstem with associated partial atelectasis of the right lower lobe.  *  Mild to moderate ascites.    < end of copied text >    Venous Dopplers: LE: < from: US Duplex Venous Lower Ext Ltd, Right (07.20.21 @ 14:08) >  FINDINGS:    There is normal compressibility of the right common femoral, femoral and popliteal veins.  The contralateral common femoral vein is patent.  Doppler examination shows normal spontaneous and phasic flow.    No calf vein thrombosis is detected.    IMPRESSION:  No evidence of right lower extremity deep venous thrombosis.    --- End of Report ---      < end of copied text >    < from: TRUMAN w/o TTE (w/3D Echo) (07.19.21 @ 09:02) >  PROCEDURE: Transesophageal (TRUMAN) was performed in the  Medical ICU.  Informed consent was first obtained for TRUMAN.  The patient was already sedated.   The procedure was  monitored with automatic blood pressure monitoring, ECG  tracings and pulse oximetry.  Gag reflex was abolished with  topical Cetacaine and the transesophageal probe was placed  in the esophagus posterior to the heart without  complications.  The patient tolerated the procedure well.  Real-time and reconstructed 3-dimensional imaging was  performed.  Color Doppler analysis was carried out using  both 2D and 3D mapping. Patient was injected with 10 cc's  of aerosolized saline.  Patient was injected with 10 cc's of aerosolized saline.  INDICATION: Endocarditis, valve unspecified (I38)  ------------------------------------------------------------------------  OBSERVATIONS:  Mitral Valve: A large (about  1.3 cm X 0.3 cm),  mobile  echodensity is visualized attached to the atrial aspect of  the medial (P3) scallop of the posterior mitral valve  leaflet and is consistent with a vegetation.  In addition,  a portion of the anterior mitral leaflet is markedly  thickened and this may represent sessile vegetation.  Moderate mitral regurgitation.  Vena contracta width about  0.4 - 0.5 cm.  Aortic Root: Normal aortic root.  Mild non-mobile  atherosclerotic plaque in the aortic arch.  Severe,  non-mobile atherosclerotic plaque in the descending  thoracic aorta.  Aortic Valve: Calcified trileaflet aortic valve with normal  opening.  No vegetations seen in association with the  aortic valve. No aortic valve regurgitation seen.  Left Atrium: Normal left atrium.  No left atrial or left  atrial appendage thrombus.  Left Ventricle: Mild segmental left ventricular systolic  dysfunction.  Hypokinesis of the basal to mid inferior  wall.  Right Heart: Normal right atrium. Right ventricular  enlargement with decreased right ventricular systolic  function. Normal tricuspid valve.  No vegetations seen in  association with the tricuspid valve.  Moderate-severe  tricuspid regurgitation. Normal pulmonic valve.  No  vegetations seen in association with the pulmonic valve.  Mild pulmonic regurgitation.  Pericardium/PleuraNormal pericardium with no pericardial  effusion.  Hemodynamic: Agitated saline injection and color flow  Doppler demonstrate evidence of a patent foramen ovale.  ------------------------------------------------------------------------  CONCLUSIONS:  1. A large (about  1.3 cm X 0.3 cm),  mobile echodensity is  visualized attached to the atrial aspect of the medial (P3)  scallop of the posterior mitral valve leaflet and is  consistent with a vegetation.  In addition, a portion of  the anterior mitral leaflet is markedly thickened and this  may represent sessile vegetation. Moderate mitral  regurgitation.  Vena contracta width about  0.4 - 0.5 cm.  2. Calcified trileaflet aortic valve with normal opening.  No vegetations seen in association with the aortic valve.  No aortic valve regurgitation seen.  3. Normal aortic root.  Mild non-mobile atherosclerotic  plaque in the aortic arch.  Severe, non-mobile  atherosclerotic plaque in the descending thoracic aorta.  4. Normal left atrium.  No left atrial or left atrial  appendage thrombus.  5. Mild segmental left ventricular systolic dysfunction.  Hypokinesis of the basal to mid inferior wall.  6. Right ventricular enlargement with decreased right  ventricular systolic function.  7. Normal tricuspid valve.  No vegetations seen in  association with the tricuspid valve.  Moderate-severe  tricuspid regurgitation.  8. Agitated saline injection and color flow Doppler  demonstrate evidence of a patent foramen ovale.    < end of copied text >

## 2021-07-22 NOTE — PROGRESS NOTE ADULT - SUBJECTIVE AND OBJECTIVE BOX
GENERAL SURGERY PROGRESS NOTE     VIOLETTA RENTERIA  70y  Male  Hospital day :19d    OVERNIGHT EVENTS: Hgb 6.8, thigh is soft    T(F): 99.9 (07-22-21 @ 04:00), Max: 101.5 (07-22-21 @ 00:00)  HR: 59 (07-22-21 @ 06:28) (59 - 108)  BP: 105/29 (07-22-21 @ 06:02) (85/25 - 153/56)  RR: 15 (07-22-21 @ 06:02) (14 - 28)  SpO2: 100% (07-22-21 @ 06:28) (94% - 100%)    DIET/FLUIDS: dextrose 5%. 1000 milliLiter(s) IV Continuous <Continuous>  dextrose 5%. 1000 milliLiter(s) IV Continuous <Continuous>    GI proph:  pantoprazole  Injectable 40 milliGRAM(s) IV Push every 12 hours    Physical Exam:  Constitutional: NAD, intubated, alert   HEENT: PERRLA, EOMI  Respiratory: Unlabored   Cardiovascular: RRR  Extremities: No peripheral edema. Motor/sensation intact b/l LE   Vascular: 2+Fem/Pop bilaterally. B/l DP/PT signals.       LABS  Labs:  CAPILLARY BLOOD GLUCOSE      POCT Blood Glucose.: 116 mg/dL (22 Jul 2021 05:08)  POCT Blood Glucose.: 118 mg/dL (21 Jul 2021 23:35)  POCT Blood Glucose.: 130 mg/dL (21 Jul 2021 17:19)  POCT Blood Glucose.: 120 mg/dL (21 Jul 2021 10:58)                          7.7    20.00 )-----------( 312      ( 22 Jul 2021 06:52 )             25.2       Auto Immature Granulocyte %: 2.0 % (07-22-21 @ 06:52)  Auto Neutrophil %: 84.8 % (07-22-21 @ 06:52)  Auto Neutrophil %: 90.4 % (07-22-21 @ 02:39)  Band Neutrophils %: 1.7 % (07-22-21 @ 02:39)  Auto Neutrophil %: 85.1 % (07-21-21 @ 18:08)  Auto Immature Granulocyte %: 1.6 % (07-21-21 @ 18:08)    07-22    134<L>  |  94<L>  |  42<H>  ----------------------------<  99  4.6   |  22  |  5.90<H>      Phosphorus Level, Serum: 5.6 mg/dL (07-22-21 @ 02:39)      LFTs:             5.1  | 0.8  | 33       ------------------[115     ( 22 Jul 2021 02:39 )  2.3  | x    | 8           Lipase:x      Amylase:x           ABG - ( 16 Jul 2021 03:14 )  pH: 7.51  /  pCO2: 35    /  pO2: 98    / HCO3: 29    / Base Excess: 4.6   /  SaO2: 98.2            ABG - ( 15 Jul 2021 18:31 )  pH: 7.59  /  pCO2: 29    /  pO2: 65    / HCO3: 30    / Base Excess: 5.3   /  SaO2: 95.8              Coags:     x      ----< x       ( 22 Jul 2021 02:39 )     35.6        CARDIAC MARKERS ( 22 Jul 2021 02:39 )  x     / x     / 610 U/L / x     / x      CARDIAC MARKERS ( 21 Jul 2021 18:08 )  x     / x     / 633 U/L / x     / x

## 2021-07-22 NOTE — PROGRESS NOTE ADULT - ATTENDING COMMENTS
70 M with CABG, ESRD on HD, known history of L pleural effusion, afib on a/c here with chest pain and concern for GIB, now with septic shock due to MRSA bacteremia requiring pressors, c/b cardiac arrest after ?Gm/vagal episode with ROSC, now acute hypoxemic respiratory failure due to likely aspiration pneumonitis, MRSA bacteremia, right groin hematoma    - c/w abx for mrsa bacteremia  - required some prbc overnight, hematoma appears similar in size on exam; CK improving.  Will monitor off a/c for now  - HD as per renal, vanco post hd  - trend cbc  - wean sedation, start PS trials, will need to clarify with son re: reintubation or not (he wants to wait to discuss with his family before we extubate)

## 2021-07-22 NOTE — PROGRESS NOTE ADULT - SUBJECTIVE AND OBJECTIVE BOX
Mainor Bowers  PGY-1 | Internal Medicine      INTERVAL HPI/OVERNIGHT EVENTS: Patient was agitated o/n and put on precedex. His hgb dropped to 6.6 requiring 1u pRBC with improvement to 7.7.     SUBJECTIVE: Patient seen and examined at bedside. Patient was on precedex and less awake this morning. He's intubated    ROS: Unable to assess    OBJECTIVE:    VITAL SIGNS:  ICU Vital Signs Last 24 Hrs  T(C): 37.4 (22 Jul 2021 12:00), Max: 38.6 (22 Jul 2021 00:00)  T(F): 99.3 (22 Jul 2021 12:00), Max: 101.5 (22 Jul 2021 00:00)  HR: 73 (22 Jul 2021 12:00) (55 - 108)  BP: 127/32 (22 Jul 2021 12:00) (85/25 - 153/56)  BP(mean): 55 (22 Jul 2021 12:00) (39 - 98)  RR: 16 (22 Jul 2021 12:00) (14 - 27)  SpO2: 100% (22 Jul 2021 12:00) (94% - 100%)    Mode: AC/ CMV (Assist Control/ Continuous Mandatory Ventilation), RR (machine): 14, TV (machine): 400, FiO2: 40, PEEP: 5, ITime: 0.75, MAP: 10, PIP: 25    07-21 @ 07:01  -  07-22 @ 07:00  --------------------------------------------------------  IN: 265.4 mL / OUT: 0 mL / NET: 265.4 mL    07-22 @ 07:01  -  07-22 @ 12:58  --------------------------------------------------------  IN: 94.9 mL / OUT: 0 mL / NET: 94.9 mL      CAPILLARY BLOOD GLUCOSE      POCT Blood Glucose.: 159 mg/dL (22 Jul 2021 12:23)      PHYSICAL EXAM:    General: NAD. Patient is intubated  HEENT: NC/AT; PERRL, clear conjunctiva  Neck: supple  Respiratory: CTA b/l  Cardiovascular: +S1/S2; RRR  Abdomen: soft, NT/ND; +BS x4  Extremities: WWP, 2+ peripheral pulses b/l; no LE edema. DP pulses not present on palpation. It can be heard on doppler. Right thigh tight and tender. Unclear if worsen with movement as patient is not able to communicate. No pallor or paralysis  Skin: normal color and turgor; no rash  Neurological: Less awake today, not alert or oriented    MEDICATIONS:  MEDICATIONS  (STANDING):  atorvastatin 40 milliGRAM(s) Oral at bedtime  chlorhexidine 0.12% Liquid 15 milliLiter(s) Oral Mucosa every 12 hours  chlorhexidine 2% Cloths 1 Application(s) Topical daily  dexMEDEtomidine Infusion 0.2 MICROgram(s)/kG/Hr (4.31 mL/Hr) IV Continuous <Continuous>  dextrose 40% Gel 15 Gram(s) Oral once  dextrose 40% Gel 15 Gram(s) Oral once  dextrose 5%. 1000 milliLiter(s) (50 mL/Hr) IV Continuous <Continuous>  dextrose 5%. 1000 milliLiter(s) (100 mL/Hr) IV Continuous <Continuous>  dextrose 50% Injectable 25 Gram(s) IV Push once  dextrose 50% Injectable 12.5 Gram(s) IV Push once  dextrose 50% Injectable 25 Gram(s) IV Push once  diltiazem    Tablet 30 milliGRAM(s) Oral four times a day  epoetin danilo-epbx (RETACRIT) Injectable 18465 Unit(s) IV Push <User Schedule>  glucagon  Injectable 1 milliGRAM(s) IntraMuscular once  insulin lispro (ADMELOG) corrective regimen sliding scale   SubCutaneous every 6 hours  norepinephrine Infusion 0.02 MICROgram(s)/kG/Min (1.62 mL/Hr) IV Continuous <Continuous>    MEDICATIONS  (PRN):  acetaminophen   Tablet .. 1000 milliGRAM(s) Oral every 6 hours PRN Temp greater or equal to 38C (100.4F), Mild Pain (1 - 3), Moderate Pain (4 - 6)  calamine/zinc oxide Lotion 1 Application(s) Topical three times a day PRN Itching      ALLERGIES:  Allergies    lisinopril (Other)  opioid-like analgesics (Other)    Intolerances        LABS:                        7.7    20.00 )-----------( 312      ( 22 Jul 2021 06:52 )             25.2     07-22    134<L>  |  94<L>  |  42<H>  ----------------------------<  99  4.6   |  22  |  5.90<H>    Ca    8.5      22 Jul 2021 02:39  Phos  5.6     07-22  Mg     1.90     07-22    TPro  5.1<L>  /  Alb  2.3<L>  /  TBili  0.8  /  DBili  x   /  AST  33  /  ALT  8   /  AlkPhos  115  07-22    PT/INR - ( 21 Jul 2021 16:27 )   PT: 15.4 sec;   INR: 1.36 ratio         PTT - ( 22 Jul 2021 02:39 )  PTT:35.6 sec      RADIOLOGY & ADDITIONAL TESTS: Reviewed. Mainor Bowers  PGY-1 | Internal Medicine      INTERVAL HPI/OVERNIGHT EVENTS: Patient was agitated o/n and put on precedex and switched over to VC. His hgb dropped to 6.6 requiring 1u pRBC with improvement to 7.7.     SUBJECTIVE: Patient seen and examined at bedside. Patient was on precedex and less awake this morning. He's intubated    ROS: Unable to assess    OBJECTIVE:    VITAL SIGNS:  ICU Vital Signs Last 24 Hrs  T(C): 37.4 (22 Jul 2021 12:00), Max: 38.6 (22 Jul 2021 00:00)  T(F): 99.3 (22 Jul 2021 12:00), Max: 101.5 (22 Jul 2021 00:00)  HR: 73 (22 Jul 2021 12:00) (55 - 108)  BP: 127/32 (22 Jul 2021 12:00) (85/25 - 153/56)  BP(mean): 55 (22 Jul 2021 12:00) (39 - 98)  RR: 16 (22 Jul 2021 12:00) (14 - 27)  SpO2: 100% (22 Jul 2021 12:00) (94% - 100%)    Mode: AC/ CMV (Assist Control/ Continuous Mandatory Ventilation), RR (machine): 14, TV (machine): 400, FiO2: 40, PEEP: 5, ITime: 0.75, MAP: 10, PIP: 25    07-21 @ 07:01 - 07-22 @ 07:00  --------------------------------------------------------  IN: 265.4 mL / OUT: 0 mL / NET: 265.4 mL    07-22 @ 07:01  -  07-22 @ 12:58  --------------------------------------------------------  IN: 94.9 mL / OUT: 0 mL / NET: 94.9 mL      CAPILLARY BLOOD GLUCOSE      POCT Blood Glucose.: 159 mg/dL (22 Jul 2021 12:23)      PHYSICAL EXAM:    General: NAD. Patient is intubated  HEENT: NC/AT; PERRL, clear conjunctiva  Neck: supple  Respiratory: CTA b/l  Cardiovascular: +S1/S2; RRR  Abdomen: soft, NT/ND; +BS x4  : worsening scrotal swelling  Extremities: WWP, 2+ peripheral pulses b/l; no LE edema. DP pulses not present on palpation. It can be heard on doppler. Right thigh tight and tender. Unclear if worsen with movement as patient is not able to communicate. No pallor or paralysis  Skin: normal color and turgor; no rash  Neurological: Less awake today, not alert or oriented    MEDICATIONS:  MEDICATIONS  (STANDING):  atorvastatin 40 milliGRAM(s) Oral at bedtime  chlorhexidine 0.12% Liquid 15 milliLiter(s) Oral Mucosa every 12 hours  chlorhexidine 2% Cloths 1 Application(s) Topical daily  dexMEDEtomidine Infusion 0.2 MICROgram(s)/kG/Hr (4.31 mL/Hr) IV Continuous <Continuous>  dextrose 40% Gel 15 Gram(s) Oral once  dextrose 40% Gel 15 Gram(s) Oral once  dextrose 5%. 1000 milliLiter(s) (50 mL/Hr) IV Continuous <Continuous>  dextrose 5%. 1000 milliLiter(s) (100 mL/Hr) IV Continuous <Continuous>  dextrose 50% Injectable 25 Gram(s) IV Push once  dextrose 50% Injectable 12.5 Gram(s) IV Push once  dextrose 50% Injectable 25 Gram(s) IV Push once  diltiazem    Tablet 30 milliGRAM(s) Oral four times a day  epoetin danilo-epbx (RETACRIT) Injectable 13288 Unit(s) IV Push <User Schedule>  glucagon  Injectable 1 milliGRAM(s) IntraMuscular once  insulin lispro (ADMELOG) corrective regimen sliding scale   SubCutaneous every 6 hours  norepinephrine Infusion 0.02 MICROgram(s)/kG/Min (1.62 mL/Hr) IV Continuous <Continuous>    MEDICATIONS  (PRN):  acetaminophen   Tablet .. 1000 milliGRAM(s) Oral every 6 hours PRN Temp greater or equal to 38C (100.4F), Mild Pain (1 - 3), Moderate Pain (4 - 6)  calamine/zinc oxide Lotion 1 Application(s) Topical three times a day PRN Itching      ALLERGIES:  Allergies    lisinopril (Other)  opioid-like analgesics (Other)    Intolerances        LABS:                        7.7    20.00 )-----------( 312      ( 22 Jul 2021 06:52 )             25.2     07-22    134<L>  |  94<L>  |  42<H>  ----------------------------<  99  4.6   |  22  |  5.90<H>    Ca    8.5      22 Jul 2021 02:39  Phos  5.6     07-22  Mg     1.90     07-22    TPro  5.1<L>  /  Alb  2.3<L>  /  TBili  0.8  /  DBili  x   /  AST  33  /  ALT  8   /  AlkPhos  115  07-22    PT/INR - ( 21 Jul 2021 16:27 )   PT: 15.4 sec;   INR: 1.36 ratio         PTT - ( 22 Jul 2021 02:39 )  PTT:35.6 sec      RADIOLOGY & ADDITIONAL TESTS: Reviewed.

## 2021-07-22 NOTE — PROGRESS NOTE ADULT - ASSESSMENT
69 yo M with PMhx of CAD s/p CABG (course at that time complicated by pleural effusion requiring chest tubes), ESRD on HD (MWF), DMII, HTN, afib on coumadin, and anemia presented from [facility] for evaluation of chest pain and GIB.       left sided pleural effusion: loculated and chronic:   CAD:  CABG:  ESRD:   DM:  HTN:  A fibrillation:   GI Bleed    7/7:    left sided pleural effusion: loculated and chronic: he has chr loculated pleural effusion on left side: He had chest tube placement in last June 2020 at another hospital: no chemistry is available but he had negative cytology at that time: This time the effusion seems slightly worse and has loculated effusion with pleural thickening: heis not SOB andhasbeen on roomair: I think , it at all, if this effusion needs to be dealt with : it is probably vats: will contact thoracic surgery : etiology not very clear as there is no previous chemistry: coult be exudative or transudatve: he is on HD and now it is a chr christiano effusion   CAD: cont per cards  CABG: cont current meds  ESRD: on HD   DM: monitor and control  HTN: controlled  A fibrillation: off ac:   GI Bleed: per gi :  DW pt and t eam1    7/8:    left sided pleural effusion:: fever: s/p left sided chest tube placement: cultures sent: however with pr criteria it seems transudative await serum LDH but pleural fluid LDH is low:  already started on broad spectrum antibiotics ID to see: await cultures:   CAD: cont per cards  CABG: cont current meds  ESRD: on HD   DM: monitor and control  HTN: controlled  A fibrillation: off ac:   GI Bleed: per gi :  DW pmd    7/9:    left sided pleural effusion:: fever: s/p left sided chest tube placement: cultures sent: however with pr criteria it seems borderline transudative and transudative by LDH criteria: the vulutres ahve been negative so far:  already started on broad spectrum antibiotics ID to see: await cultures: ? source of spesis  CAD: cont per cards  CABG: cont current meds  ESRD: on HD   DM: monitor and control  HTN: in shock: now on vasopressors:   A fibrillation: off ac:   GI Bleed: per gi :  DW pmd  micu team    7/11:      left sided pleural effusion:: fever: s/p left sided chest tube placement: cultures sent: however with pr criteria it seems borderline transudative and transudative by LDH criteria: the cultures have been negative so far:  already started on broad spectrum antibiotics : Has MSSA bactermia  CAD: cont per cards: still on vasopressors: wean as tolerated ? needs molly ro tule out IE : would defer to cards:   CABG: cont current meds  ESRD: on HD   DM: monitor and control  HTN: in shock: now on vasopressors:   A fibrillation: off ac:   GI Bleed: per gi :  DW pmd  micu team    7/12:    left sided pleural effusion:: fever: s/p left sided chest tube placement:- now removed: cultures sent: however with pr criteria it seems borderline transudative and transudative by LDH criteria: the cultures have been negative so far:  already started on broad spectrum antibiotics : Has MRSA bactermia: vanco restarted yesterday ? echo/molly  Ac hypercarbic resp fialure: needs to be watched closelyin MICU : needs bipap may need intubation" At this time he is alert and awake: and responds to simple questions:    CAD: cont per cards: still on vasopressors: wean as tolerated ? needs molly ro tule out IE : would defer to cards:   CABG: cont current meds  ESRD: on HD   DM: monitor and control  HTN: in shock: now on vasopressors:   A fibrillation: off ac:   GI Bleed: per gi :  DW pmd  cards and bedstaff    7/13:  left sided pleural effusion:: fever: s/p left sided chest tube placement:- now removed: cultures sent: however with pr criteria it seems borderline transudative and transudative by LDH criteria: the cultures have been negative so far:  already started on broad spectrum antibiotics : Has MRSA bacteremia vanco restarted yesterday ? echo/molly  Ac hypercarbic resp fialure: now intubated s/p acls protocol last night   CAD: cont per cards: still on vasopressors: wean as tolerated ? needs molly ro tule out IE : would defer to cards:   CABG: cont current meds  ESRD: on HD   DM: monitor and control  HTN: in shock: now on vasopressors:   A fibrillation: off ac:   GI Bleed: per gi :  LINDA  staff  pt is critically ill now:     7/14:  left sided pleural effusion:: fever: s/p left sided chest tube placement:- now removed: cultures sent: however with pr criteria it seems borderline transudative and transudative by LDH criteria: the cultures have been negative so far:  already started on broad spectrum antibiotics : Has MRSA bacteremia vanco restarted yesterday now for  echo/molly  Ac hypercarbic resp fialure: now intubated s/p acls protocol last night -s/p cardiac arrest: ROSC 10 mts:   CAD: cont per cards: still on vasopressors: wean as tolerated ? needs molly ro tule out IE : would defer to cards: Cotn antbitioc  CABG: cont current meds  ESRD: on HD   DM: monitor and control  HTN: in shock: now on vasopressors:   A fibrillation: off ac:   GI Bleed: per gi :  DW  staff  pt is critically ill now: dw cards    7/15:    left sided pleural effusion:: transudative pl effusion with no with: certainly not the source for MRSA in blood:   Ac hypercarbic resp fialure: now intubated s/p acls protocol last night -s/p cardiac arrest: ROSC 10 mts:   CAD: cont per cards: still on vasopressors: wean as tolerated ? needs molly ro tule out IE : would defer to cards: Cotn antbitioc  CABG: cont current meds  ESRD: on HD   DM: monitor and control  HTN: in shock: now on vasopressors:   A fibrillation: on ac:   GI Bleed: per gi :  DW  staff  pt is critically ill now:     7/16"  left sided pleural effusion:: transudative pl effusion with no with: certainly not the source for MRSA in blood: ?source for MRSA:   Ac hypercarbic resp fialure: now intubated s/p acls protocol last night -s/p cardiac arrest: ROSC 10 mts: he seems to respond off sedation:per MICU note:   CAD: cont per cards: still on vasopressors: wean as tolerated ? needs molly ro tule out IE : would defer to cards: Cotn antbitioc  CABG: cont current meds  ESRD: on HD   DM: monitor and control  HTN: in shock: now on vasopressors:   A fibrillation: on ac:   GI Bleed: per gi :  DW  staff: PMD  pt is critically ill now:      7/17:    left sided pleural effusion:: transudative pl effusion with no with: certainly not the source for MRSA in blood: ?source for MRSA: for molly on monday  Ac hypercarbic resp fialure: now intubated s/p acls protocol last night -s/p cardiac arrest: ROSC 10 mts:   CAD: cont per cards: still on vasopressors: wean as tolerated ? needs molly ro tule out IE : would defer to cards: Cont antibiotics:   CABG: cont current meds  ESRD: on HD   DM: monitor and control  HTN: in shock: now on vasopressors:   A fibrillation: on ac:   GI Bleed: per gi :  DW  staff: PMD  pt is critically ill now:    7/18:    left sided pleural effusion:: transudative pl effusion with no with: certainly not the source for MRSA in blood: ?source for MRSA: for molly on monday: he is off sedation   Ac hypercarbic resp fialure: now intubated s/p acls protocol last night -s/p cardiac arrest: ROSC 10 mts:   Acute cva: noted on ct scan head: neurology following: already on heparin   CAD: cont per cards: still on vasopressors: wean as tolerated ? needs molly ro tule out IE : would defer to cards: Cont antibiotics:   CABG: cont current meds  ESRD: on HD   DM: monitor and control  HTN: in shock: now on vasopressors:   A fibrillation: on ac:   GI Bleed: per gi :  pt is critically ill now:    7/19:      left sided pleural effusion:: transudative pl effusion with no with: certainly not the source for MRSA in blood: ?source for MRSA: for molly today: he is off sedation but still on pressors:   Ac hypercarbic resp fialure: now intubated s/p acls protocol last night -s/p cardiac arrest: ROSC 10 mts:   Acute cva: noted on ct scan head: neurology following: already on heparin   CAD: cont per cards: still on vasopressors: wean as tolerated ? needs molly ro tule out IE : would defer to cards: Cont antibiotics: for molly today  CABG: cont current meds  ESRD: on HD   DM: monitor and control  HTN: in shock: now on vasopressors:   A fibrillation: on ac:   GI Bleed: per gi :  pt is critically ill now:    7/20  Acute Hypercarbic Respiratory Failure  -S/p RRT, tx to MICU 7/8, intubated 7/13 s/p cardiac arrest w/ ROSC  -CPAP trials, ventilator management per MICU team  -Pulmonary toileting, suction PRN   L pleural effusion   -Loculated L pleural effusion on CT chest, s/p CT placement by thoracic 7/8  -Transudative effusion, Cx negative   -CT since removed   -Repeat CT chest with small b/l partially loculated effusions, adjacent atelectasis  Bacteremia  -BC 7/9 MRSA+  -ABX per ID  -Repeat BC NGTD   -F/u cardiology recs regarding MOLLY   CAD (s/p CABG) - per cards   ESRD - HD per renal   GI bleed - GI recs noted     7/21  Acute Hypercarbic Respiratory Failure  -S/p RRT, tx to MICU 7/8, intubated 7/13 s/p cardiac arrest w/ ROSC  -CPAP trials, ventilator management per MICU team  -Pulmonary toileting, suction PRN   L pleural effusion   -Loculated L pleural effusion on CT chest, s/p CT placement by thoracic 7/8  -Transudative effusion, Cx negative   -CT since removed   -Repeat CT chest with small b/l partially loculated effusions, adjacent atelectasis  Bacteremia  -BC 7/9 MRSA+  -ABX per ID  -Repeat BC NGTD   -MOLLY now with MV vegetation per cards  -Pending CT lumbar spine   -F/u CT surgery recs  CAD (s/p CABG) - per [facility]   ESRD - HD per renal   GI bleed - GI recs noted     7/22  Acute Hypercarbic Respiratory Failure  --S/p RRT, tx to MICU 7/8, intubated 7/13 s/p cardiac arrest w/ ROSC  --CPAP trials, ventilator management per MICU team  --Pulmonary toileting, suction PRN   L pleural effusion   --Loculated L pleural effusion on CT chest, s/p CT placement by thoracic 7/8  --Transudative effusion, Cx negative   --CT since removed   --Repeat CT chest with small b/l partially loculated effusions, adjacent atelectasis  Bacteremia  --BC 7/9 MRSA+, repeat BC NGTD   --MOLLY now with MV vegetation, not a candidate for surgery per CTS  --CT lumbar spine with no evidence of discitis   --ABX per ID  DVT prophylaxis  --AC held due to drop in H/H  --Concern for R thigh hematoma, vascular recs noted   CAD (s/p CABG) - per cards   ESRD - HD per renal   GI bleed - resolved, GI recs noted   Care per MICU team      71 yo M with PMhx of CAD s/p CABG (course at that time complicated by pleural effusion requiring chest tubes), ESRD on HD (MWF), DMII, HTN, afib on coumadin, and anemia presented from [facility] for evaluation of chest pain and GIB.       left sided pleural effusion: loculated and chronic:   CAD:  CABG:  ESRD:   DM:  HTN:  A fibrillation:   GI Bleed    7/7:    left sided pleural effusion: loculated and chronic: he has chr loculated pleural effusion on left side: He had chest tube placement in last June 2020 at another hospital: no chemistry is available but he had negative cytology at that time: This time the effusion seems slightly worse and has loculated effusion with pleural thickening: heis not SOB andhasbeen on roomair: I think , it at all, if this effusion needs to be dealt with : it is probably vats: will contact thoracic surgery : etiology not very clear as there is no previous chemistry: coult be exudative or transudatve: he is on HD and now it is a chr christiano effusion   CAD: cont per cards  CABG: cont current meds  ESRD: on HD   DM: monitor and control  HTN: controlled  A fibrillation: off ac:   GI Bleed: per gi :  DW pt and t eam1    7/8:    left sided pleural effusion:: fever: s/p left sided chest tube placement: cultures sent: however with pr criteria it seems transudative await serum LDH but pleural fluid LDH is low:  already started on broad spectrum antibiotics ID to see: await cultures:   CAD: cont per cards  CABG: cont current meds  ESRD: on HD   DM: monitor and control  HTN: controlled  A fibrillation: off ac:   GI Bleed: per gi :  DW pmd    7/9:    left sided pleural effusion:: fever: s/p left sided chest tube placement: cultures sent: however with pr criteria it seems borderline transudative and transudative by LDH criteria: the vulutres ahve been negative so far:  already started on broad spectrum antibiotics ID to see: await cultures: ? source of spesis  CAD: cont per cards  CABG: cont current meds  ESRD: on HD   DM: monitor and control  HTN: in shock: now on vasopressors:   A fibrillation: off ac:   GI Bleed: per gi :  DW pmd  micu team    7/11:      left sided pleural effusion:: fever: s/p left sided chest tube placement: cultures sent: however with pr criteria it seems borderline transudative and transudative by LDH criteria: the cultures have been negative so far:  already started on broad spectrum antibiotics : Has MSSA bactermia  CAD: cont per cards: still on vasopressors: wean as tolerated ? needs molly ro tule out IE : would defer to cards:   CABG: cont current meds  ESRD: on HD   DM: monitor and control  HTN: in shock: now on vasopressors:   A fibrillation: off ac:   GI Bleed: per gi :  DW pmd  micu team    7/12:    left sided pleural effusion:: fever: s/p left sided chest tube placement:- now removed: cultures sent: however with pr criteria it seems borderline transudative and transudative by LDH criteria: the cultures have been negative so far:  already started on broad spectrum antibiotics : Has MRSA bactermia: vanco restarted yesterday ? echo/molly  Ac hypercarbic resp fialure: needs to be watched closelyin MICU : needs bipap may need intubation" At this time he is alert and awake: and responds to simple questions:    CAD: cont per cards: still on vasopressors: wean as tolerated ? needs molly ro tule out IE : would defer to cards:   CABG: cont current meds  ESRD: on HD   DM: monitor and control  HTN: in shock: now on vasopressors:   A fibrillation: off ac:   GI Bleed: per gi :  DW pmd  cards and bedstaff    7/13:  left sided pleural effusion:: fever: s/p left sided chest tube placement:- now removed: cultures sent: however with pr criteria it seems borderline transudative and transudative by LDH criteria: the cultures have been negative so far:  already started on broad spectrum antibiotics : Has MRSA bacteremia vanco restarted yesterday ? echo/molly  Ac hypercarbic resp fialure: now intubated s/p acls protocol last night   CAD: cont per cards: still on vasopressors: wean as tolerated ? needs molly ro tule out IE : would defer to cards:   CABG: cont current meds  ESRD: on HD   DM: monitor and control  HTN: in shock: now on vasopressors:   A fibrillation: off ac:   GI Bleed: per gi :  LINDA  staff  pt is critically ill now:     7/14:  left sided pleural effusion:: fever: s/p left sided chest tube placement:- now removed: cultures sent: however with pr criteria it seems borderline transudative and transudative by LDH criteria: the cultures have been negative so far:  already started on broad spectrum antibiotics : Has MRSA bacteremia vanco restarted yesterday now for  echo/molly  Ac hypercarbic resp fialure: now intubated s/p acls protocol last night -s/p cardiac arrest: ROSC 10 mts:   CAD: cont per cards: still on vasopressors: wean as tolerated ? needs molly ro tule out IE : would defer to cards: Cotn antbitioc  CABG: cont current meds  ESRD: on HD   DM: monitor and control  HTN: in shock: now on vasopressors:   A fibrillation: off ac:   GI Bleed: per gi :  DW  staff  pt is critically ill now: dw cards    7/15:    left sided pleural effusion:: transudative pl effusion with no with: certainly not the source for MRSA in blood:   Ac hypercarbic resp fialure: now intubated s/p acls protocol last night -s/p cardiac arrest: ROSC 10 mts:   CAD: cont per cards: still on vasopressors: wean as tolerated ? needs molly ro tule out IE : would defer to cards: Cotn antbitioc  CABG: cont current meds  ESRD: on HD   DM: monitor and control  HTN: in shock: now on vasopressors:   A fibrillation: on ac:   GI Bleed: per gi :  DW  staff  pt is critically ill now:     7/16"  left sided pleural effusion:: transudative pl effusion with no with: certainly not the source for MRSA in blood: ?source for MRSA:   Ac hypercarbic resp fialure: now intubated s/p acls protocol last night -s/p cardiac arrest: ROSC 10 mts: he seems to respond off sedation:per MICU note:   CAD: cont per cards: still on vasopressors: wean as tolerated ? needs molly ro tule out IE : would defer to cards: Cotn antbitioc  CABG: cont current meds  ESRD: on HD   DM: monitor and control  HTN: in shock: now on vasopressors:   A fibrillation: on ac:   GI Bleed: per gi :  DW  staff: PMD  pt is critically ill now:      7/17:    left sided pleural effusion:: transudative pl effusion with no with: certainly not the source for MRSA in blood: ?source for MRSA: for molly on monday  Ac hypercarbic resp fialure: now intubated s/p acls protocol last night -s/p cardiac arrest: ROSC 10 mts:   CAD: cont per cards: still on vasopressors: wean as tolerated ? needs molly ro tule out IE : would defer to cards: Cont antibiotics:   CABG: cont current meds  ESRD: on HD   DM: monitor and control  HTN: in shock: now on vasopressors:   A fibrillation: on ac:   GI Bleed: per gi :  DW  staff: PMD  pt is critically ill now:    7/18:    left sided pleural effusion:: transudative pl effusion with no with: certainly not the source for MRSA in blood: ?source for MRSA: for molly on monday: he is off sedation   Ac hypercarbic resp fialure: now intubated s/p acls protocol last night -s/p cardiac arrest: ROSC 10 mts:   Acute cva: noted on ct scan head: neurology following: already on heparin   CAD: cont per cards: still on vasopressors: wean as tolerated ? needs molly ro tule out IE : would defer to cards: Cont antibiotics:   CABG: cont current meds  ESRD: on HD   DM: monitor and control  HTN: in shock: now on vasopressors:   A fibrillation: on ac:   GI Bleed: per gi :  pt is critically ill now:    7/19:      left sided pleural effusion:: transudative pl effusion with no with: certainly not the source for MRSA in blood: ?source for MRSA: for molly today: he is off sedation but still on pressors:   Ac hypercarbic resp fialure: now intubated s/p acls protocol last night -s/p cardiac arrest: ROSC 10 mts:   Acute cva: noted on ct scan head: neurology following: already on heparin   CAD: cont per cards: still on vasopressors: wean as tolerated ? needs molly ro tule out IE : would defer to cards: Cont antibiotics: for molly today  CABG: cont current meds  ESRD: on HD   DM: monitor and control  HTN: in shock: now on vasopressors:   A fibrillation: on ac:   GI Bleed: per gi :  pt is critically ill now:    7/20  Acute Hypercarbic Respiratory Failure  -S/p RRT, tx to MICU 7/8, intubated 7/13 s/p cardiac arrest w/ ROSC  -CPAP trials, ventilator management per MICU team  -Pulmonary toileting, suction PRN   L pleural effusion   -Loculated L pleural effusion on CT chest, s/p CT placement by thoracic 7/8  -Transudative effusion, Cx negative   -CT since removed   -Repeat CT chest with small b/l partially loculated effusions, adjacent atelectasis  Bacteremia  -BC 7/9 MRSA+  -ABX per ID  -Repeat BC NGTD   -F/u cardiology recs regarding MOLLY   CAD (s/p CABG) - per cards   ESRD - HD per renal   GI bleed - GI recs noted     7/21  Acute Hypercarbic Respiratory Failure  -S/p RRT, tx to MICU 7/8, intubated 7/13 s/p cardiac arrest w/ ROSC  -CPAP trials, ventilator management per MICU team  -Pulmonary toileting, suction PRN   L pleural effusion   -Loculated L pleural effusion on CT chest, s/p CT placement by thoracic 7/8  -Transudative effusion, Cx negative   -CT since removed   -Repeat CT chest with small b/l partially loculated effusions, adjacent atelectasis  Bacteremia  -BC 7/9 MRSA+  -ABX per ID  -Repeat BC NGTD   -MOLLY now with MV vegetation per cards  -Pending CT lumbar spine   -F/u CT surgery recs  CAD (s/p CABG) - per cards   ESRD - HD per renal   GI bleed - GI recs noted     7/22  Acute Hypercarbic Respiratory Failure  --S/p RRT, tx to MICU 7/8, intubated 7/13 s/p cardiac arrest w/ ROSC-10 mts  --CPAP trials, ventilator management per MICU team  --Pulmonary toileting, suction PRN   L pleural effusion   --Loculated L pleural effusion on CT chest, s/p CT placement by thoracic 7/8  --Transudative effusion, Cx negative   --CT since removed   --Repeat CT chest with small b/l partially loculated effusions, adjacent atelectasis  Bacteremia  --BC 7/9 MRSA+, repeat BC NGTD   --MOLLY now with MV vegetation, not a candidate for surgery per CTS  --CT lumbar spine with no evidence of discitis   --ABX per ID  DVT prophylaxis  --AC held due to drop in H/H  --Concern for R thigh hematoma, vascular recs noted   CAD (s/p CABG) - per cards   ESRD - HD per renal   GI bleed - resolved, GI recs noted   Care per MICU team

## 2021-07-22 NOTE — PROGRESS NOTE ADULT - ASSESSMENT
69 yo M with complex PMhx of CAD s/p CABG (course at that time complicated by pleural effusion requiring chest tubes), ESRD on HD (MWF), DMII, HTN, afib on coumadin, and anemia presented from [facility] for evaluation of chest pain and GIB. Since admission to [facility] pt without evidence of GIB. Course has been c/b cardiac arrest on 07/13, intubated, left pleural effusion s/p chest tube (now removed) and sepsis with MRSA bacteremia requiring pressor support. Most recent pt found with vegetations on TTE. Vascular surgery consulted for newly noted right thigh hematoma/concern for compartment syndrome. Thigh is soft this morning on exam. Clinically no evidence for compartment syndrome.     - Heparin gtt for afib is held in setting of transfusion requirements, thigh soft this AM on exam, consider alternate source for active bleeding  - Continue to hold AC, trend H&H   - Monitor vitals, pressor requirements  - Monitor for signs of compartment syndrome   - Trend CPK, q1hr neurovascular checks  - No acute surgical intervention, will follow  - Discussed with vascular surgery fellow     C Team Surgery   [id number]

## 2021-07-22 NOTE — PROGRESS NOTE ADULT - ASSESSMENT
70 year old male with Anemia    Anemia  CT w/thigh hematoma; cont management per vascular   Monitor CBC and Keep hemoglobin > 8 given cardiac issues   negative occult x 2; without overt gi bleeding  Continue PPI   tube feeds per micu    Ascites  2/2 RHF w/dilated IVC on CT   diuresis per cardiology     Endocarditis  TRUMAN with Large Vegetation  management per ID recs     CVA, Embolic   care per neurology, cardiology and micu appreciated     Cardiac Arrest  Care per cardiology and MICU appreciated     Advanced care planning was discussed with family.  Advanced care planning forms were reviewed and discussed.  Risks, benefits and alternatives of gastroenterologic procedures were discussed in detail and all questions were answered.  30 minutes spent.

## 2021-07-22 NOTE — PROGRESS NOTE ADULT - SUBJECTIVE AND OBJECTIVE BOX
Follow Up:      Inverval History/ROS:Patient is a 70y old  Male who presents with a chief complaint of Chest pain and GIB (22 Jul 2021 08:29)    No fever  No events  Intubated    Allergies    lisinopril (Other)  opioid-like analgesics (Other)    Intolerances        ANTIMICROBIALS:      OTHER MEDS:  acetaminophen   Tablet .. 1000 milliGRAM(s) Oral every 6 hours PRN  atorvastatin 40 milliGRAM(s) Oral at bedtime  calamine/zinc oxide Lotion 1 Application(s) Topical three times a day PRN  chlorhexidine 0.12% Liquid 15 milliLiter(s) Oral Mucosa every 12 hours  chlorhexidine 2% Cloths 1 Application(s) Topical daily  dexMEDEtomidine Infusion 0.2 MICROgram(s)/kG/Hr IV Continuous <Continuous>  dextrose 40% Gel 15 Gram(s) Oral once  dextrose 40% Gel 15 Gram(s) Oral once  dextrose 5%. 1000 milliLiter(s) IV Continuous <Continuous>  dextrose 5%. 1000 milliLiter(s) IV Continuous <Continuous>  dextrose 50% Injectable 25 Gram(s) IV Push once  dextrose 50% Injectable 12.5 Gram(s) IV Push once  dextrose 50% Injectable 25 Gram(s) IV Push once  diltiazem    Tablet 30 milliGRAM(s) Oral four times a day  epoetin danilo-epbx (RETACRIT) Injectable 39321 Unit(s) IV Push <User Schedule>  glucagon  Injectable 1 milliGRAM(s) IntraMuscular once  insulin lispro (ADMELOG) corrective regimen sliding scale   SubCutaneous every 6 hours  norepinephrine Infusion 0.02 MICROgram(s)/kG/Min IV Continuous <Continuous>      Vital Signs Last 24 Hrs  T(C): 37.2 (22 Jul 2021 08:00), Max: 38.6 (22 Jul 2021 00:00)  T(F): 99 (22 Jul 2021 08:00), Max: 101.5 (22 Jul 2021 00:00)  HR: 57 (22 Jul 2021 10:01) (55 - 108)  BP: 117/25 (22 Jul 2021 10:00) (85/25 - 153/56)  BP(mean): 45 (22 Jul 2021 10:00) (39 - 98)  RR: 14 (22 Jul 2021 10:00) (14 - 27)  SpO2: 100% (22 Jul 2021 10:01) (94% - 100%)    PHYSICAL EXAM:  General: [x ] intubated  HEAD/EYES: [ ] PERRL [x ] white sclera [ ] icterus  ENT:  [ ] normal [x ] supple [ ] thrush [ ] pharyngeal exudate  Cardiovascular:   [ ] murmur [x ] normal [ ] PPM/AICD  Respiratory:  [x ] clear to ausculation bilaterally  GI:  [x ] soft, non-tender, normal bowel sounds  :  [ ] wagner [x ] no CVA tenderness   Musculoskeletal:  [ ] no synovitis  Neurologic:  [ ] non-focal exam   Skin:  x[ ] no rash  Lymph: [ ] no lymphadenopathy  Psychiatric:  [ ] appropriate affect [ x] alert & oriented  Lines:  [x ] no phlebitis [ ] central line                                7.7    20.00 )-----------( 312      ( 22 Jul 2021 06:52 )             25.2       07-22    134<L>  |  94<L>  |  42<H>  ----------------------------<  99  4.6   |  22  |  5.90<H>    Ca    8.5      22 Jul 2021 02:39  Phos  5.6     07-22  Mg     1.90     07-22    TPro  5.1<L>  /  Alb  2.3<L>  /  TBili  0.8  /  DBili  x   /  AST  33  /  ALT  8   /  AlkPhos  115  07-22          MICROBIOLOGY:    RADIOLOGY:

## 2021-07-22 NOTE — PROGRESS NOTE ADULT - ASSESSMENT
Assessment and Plan: 	  71 yo M with PMhx of CAD s/p CABG (course at that time complicated by pleural effusion requiring chest tubes), ESRD on HD (MWF), DMII, HTN, afib on coumadin, and anemia transferred from [facility] for evaluation of chest pain after HD and GIB s/p 2U PRBC transfusion and aflutter/afib with RVR managed with metoprolol. Admitted to MICU for hypotension dependent on pressors likely 2/2 to sepsis. In MICU, patient had a cardiac arrest on 7/13 requiring CPR, 3x epi, and 2x shock (for VTach). Course now complicated by recent drop in hgb requiring pRBCs. CT of leg shows hematoma on right adductor.    #Neuro  - Off of sedation. Patient opens his eyes on verbal command. Not alert or oriented  - CT head shows foci of low density in the R anterolateral frontal lobe and L posterior temporal lobe concerning for acute infarcts.  - Mechanism likely cardioembolic in setting of patient with known atrial fibrillation with cardiac arrest vs septic emboli  - per neurology, can consider MRI brain w/o contrast. However, unlikely to  since patient has known atrial fibrillation and will need to be continued of full dose anticoagulation.  - Avoid hypotension, keep -180 for neuroprotection  - c/w Atorvastatin 40MG QHS for secondary stroke prevention.    #Cardiovascular  - Cardiac arrest: Patient had asystole night of 7/13 and CPR was started. S/p epi x3 and shock x2 with return to NSR. Cards is following  - TTE on 7/13 showed Mild mitral regurgitation with biventricular dysfunction of LV and RV systolic function. Endocardium was not well visualized, Hypokinesis of the basal inferior wall and basal inferoseptum.  - TRUMAN on 7/19 showed a large echodensity consisted with endocarditis and PFO  - Awaiting cardiac recs. Cardiology will speak to family regarding TRUMAN results and next steps.  - Cardiology spoke with CTS, no surgery at this time. (Dr. Herzog)  - c/w Levophed wean as tolerated.   - BP goal 140-180 to maintain cerebral perfusion after stroke  - Will consider switching to midodrine once HR stable  - Troponins 2400, CKMB 7.1. Troponin stable from prior to cardiac arrest. They are likely 2/2 chronic condition/ESRD on HD vs Hypotensive event. Stable, no need to trend    A. Fib  - on coumadin at home,  - c/ heparin gtt prophylaxis. PTT improved, patient on heparin. Restarted hepatin after TRUMAN  - From neurology stand point, heparin is sufficient for now. Will need to be switched to DOAC in the future if kidney function allows  - 7/11: tried to transition heparin gtt to eliquis but pt is not a candidate due to his acute MIGUEL; back on heparin gtt  - c/ diltiazem for rate control    #Respiratory  - Loculated Pleural effusions: chest tube removed  - pleural fluid cx: (-); transudative.  - intubated and on ventilator (24/400/5/40). F/u ABG and adjust as necessary  - Patient O2 sat stable on CPAP today (10/5)  - CXR s/p chest tube removal showed no evidence of atelectasis. Demonstrated loculated left pleural effusion decreased in size and new hazy right lower lung opacity which could be due to a small layering right pleural effusion with associated passive atelectasis, atelectasis of other cause, and/or pneumonia.  - abx as below    #GI/Nutrition:  - NPO with tube feed. c/w feeds  - Was c/o abdominal pain on presentation, bedside POCUS didn't show ascites. No abdominal pain at this time. Continue to monitor.   - negative occult bleeding, hgb stable  - Per GI, recent decrease in hgb less likely to be a GIB, appreciate recs  - FOBT negative  - Zofran PRN for Nausea.    #/Renal  - ESRD: HD on MWF. Most recent Scr 4.0. Potassium is stable.   - Last dialysis session 7/20. Per nephro, will received dialysis tomorrow  - Electrolytes stable  - c/w Epo 20k tiw with HD      #Skin  - HD Access: fistula in left upper arm  - Improving erythematous and tender on right wrist. Continue to monitor. RUE US neg for thrombus  - US of fistula showed patent fistula with no abscess  - Arrow is patent in right upper extremity.    #ID  - S aureus PCR: (+),  MRSA PCR negative  - BCx on 7/7 now growing MRSA: restarted on vanc (7/11- )  - Vanc 7/19 was 23.1. Will obtain level tomorrow. No dialysis today  - Surveillance blood Cx have been negative for 48hours, no longer need to continue    #Endocrine  - DMII: c/w Lispro SS. Monitor glucose.      #Hematologic/DVT ppx  - monitor PTT  - Will hold heparin in setting of recent decrease in hgb. Required another unit of blood o/n  - Patient complaining of right thigh pain. CTA demonstrated A moderate-sized hematoma in the right adductor muscle group.  - Contact surgery regarding the hematoma, awaiting recs  - PRN dilaudid for pain control  - PTT still elevated off of hepatin, will send mixing study    #Ethics  - full code as per wife- full code as per wife   Assessment and Plan: 	  69 yo M with PMhx of CAD s/p CABG (course at that time complicated by pleural effusion requiring chest tubes), ESRD on HD (MWF), DMII, HTN, afib on coumadin, and anemia transferred from [facility] for evaluation of chest pain after HD and GIB s/p 2U PRBC transfusion and aflutter/afib with RVR managed with metoprolol. Admitted to MICU for hypotension dependent on pressors likely 2/2 to sepsis. In MICU, patient had a cardiac arrest on 7/13 requiring CPR, 3x epi, and 2x shock (for VTach). Course now complicated by recent drop in hgb requiring pRBCs. CT of leg shows hematoma on right adductor.    #Neuro  - Off of sedation. Patient opens his eyes on verbal command. Not alert or oriented at this time  - CT head shows foci of low density in the R anterolateral frontal lobe and L posterior temporal lobe concerning for acute infarcts.  - Mechanism likely cardioembolic in setting of patient with known atrial fibrillation with cardiac arrest vs septic emboli  - per neurology, can consider MRI brain w/o contrast. However, unlikely to  since patient has known atrial fibrillation and will need to be continued of full dose anticoagulation.  - Avoid hypotension, keep -180 for neuroprotection  - c/w Atorvastatin 40MG QHS for secondary stroke prevention.    #Cardiovascular  - Cardiac arrest: Patient had asystole night of 7/13 and CPR was started. S/p epi x3 and shock x2 with return to NSR. Cards is following  - TTE on 7/13 showed Mild mitral regurgitation with biventricular dysfunction of LV and RV systolic function. Endocardium was not well visualized, Hypokinesis of the basal inferior wall and basal inferoseptum.  - TRUMAN on 7/19 showed a large echodensity consisted with endocarditis and PFO  - Cardiology spoke with CTS, no surgery at this time. (Dr. Herzog)  - c/w Levophed wean as tolerated.   - BP goal 140-180 to maintain cerebral perfusion after stroke  - Will consider switching to midodrine once HR stable  - Troponins 2400, CKMB 7.1. Troponin stable from prior to cardiac arrest. They are likely 2/2 chronic condition/ESRD on HD vs Hypotensive event. Stable, no need to trend    A. Fib  - on coumadin at home,  - holding heparin in setting of recent drop in hgb  - From neurology stand point, heparin is sufficient for now. Will need to be switched to DOAC in the future if kidney function allows  - 7/11: tried to transition heparin gtt to eliquis but pt is not a candidate due to his acute MIGUEL; back on heparin gtt  - c/ diltiazem for rate control    #Respiratory  - Loculated Pleural effusions: chest tube removed  - pleural fluid cx: (-); transudative.  - intubated and on ventilator (24/400/5/40). F/u ABG and adjust as necessary  - Will trial on CPAP again today  - CXR s/p chest tube removal showed no evidence of atelectasis. Demonstrated loculated left pleural effusion decreased in size and new hazy right lower lung opacity which could be due to a small layering right pleural effusion with associated passive atelectasis, atelectasis of other cause, and/or pneumonia.  - abx as below    #GI/Nutrition:  - NPO with tube feed. Holding at this time in preparation for extubation  - Was c/o abdominal pain on presentation, bedside POCUS didn't show ascites. No abdominal pain at this time. Continue to monitor.   - negative occult bleeding, hgb stable  - Per GI, recent decrease in hgb less likely to be a GIB, appreciate recs  - FOBT negative  - Zofran PRN for Nausea.    #/Renal  - ESRD: HD on MWF. Most recent Scr 4.6. Potassium is stable.   - Last dialysis session 7/20. Per nephro, will receive dialysis today  - Electrolytes stable  - c/w Epo 20k tiw with HD  - Worsening swelling of scrotum, will obtain scrotal US      #Skin  - HD Access: fistula in left upper arm  - Improving erythematous and tender on right wrist. Continue to monitor. RUE US neg for thrombus  - US of fistula showed patent fistula with no abscess  - Arrow is patent in right upper extremity.    #ID  - S aureus PCR: (+),  MRSA PCR negative  - BCx on 7/7 now growing MRSA: restarted on vanc (7/11- )  - Vanc 7/19 was 21.8. Will give 500 after dialysis session  - Surveillance blood Cx have been negative for 48hours since 7/12, no longer need to continue    #Endocrine  - DMII: c/w Lispro SS. Monitor glucose.      #Hematologic/DVT ppx  - monitor PTT and cbc q12  - Will hold heparin in setting of recent decrease in hgb. Required another unit of blood o/n  - Patient complaining of right thigh pain. CTA demonstrated A moderate-sized hematoma in the right adductor muscle group.  - Contact surgery regarding the hematoma, no surgery at this time. Recommended ACE wrap at this time.  - PRN dilaudid for pain control  - Leukocytosis in 20s this morning. Will obtain Cx if elevated in next blood draw    #Ethics  - full code as per wife- full code as per wife

## 2021-07-22 NOTE — PROGRESS NOTE ADULT - ASSESSMENT
70 year old with ESRD, DM, CAD presented with anemia and chest pain    Recently diagnosed Staph aureus bacteremia  Course complicated by arrest, imaging with ? CVA    1)Staph bacteremia- ? phlebitis as focus  -prelim results suggested MSSA  now identified as MRSA-     Dose Vancomycin based on daily level     Repeat blood cultures 7/9 with growth in 2 of 4 bottles  Repeat blood culture 7/11 and 7/12 without growth    TRUMAN with MV endocarditis    Consider imaging LS spine when more stable- had c/p back pain   May also want to rule out CNS mycotic anuerysm     2) Fever  Likely due to bacteremia  Monitor    3) Pleural effusion- chronic - drained in 6/2020; repeat thoracentesis this admission    S/p thoracentesis- appears transudative     4) Leukocytosis  continue to monitor    5) ESRD  if on regular HD schedule- can change to Vancomycin 750 mg iv after HD on T/Th/Saturday      Plan dw ICU

## 2021-07-22 NOTE — PROGRESS NOTE ADULT - ASSESSMENT
71 yo M with PMhx of CAD s/p CABG (course at that time complicated by pleural effusion requiring chest tubes), ESRD on HD (TTS), DMII, HTN, afib on coumadin, and anemia presented from [facility] for evaluation of chest pain and GIB. Renal following for ESRD Mx. s/p cardiac arrest. CTH showed acute infarcts. Further w/u, MRSA bacteremia, TRUMAN showed MR vegetation.     ESRD  Maintenance schedule TTS  K, vol acceptable  Repeat HD today, will attempt to remove 0.5-1kg UF, as BP tolerates.   Worsening anasarca noted.   Electrolytes within normal limits.   Improved hemodynamics, pressor titration as per ICU team. on Levophed wean as tolerated.   On diltiazem for afib rate control.   On vanc, dosed by levels, for MRSA bacteremia and endocarditis.   dose all meds for ESRD    Anemia in CKD+GIB- Hb <goal  c/w Epo 20k tiw with HD  transfused prbc

## 2021-07-22 NOTE — PROGRESS NOTE ADULT - SUBJECTIVE AND OBJECTIVE BOX
INTERVAL HPI/OVERNIGHT EVENTS:    unobtainable   had brown stools yesterday evening  events noted, CT reviewed     MEDICATIONS  (STANDING):  atorvastatin 40 milliGRAM(s) Oral at bedtime  chlorhexidine 0.12% Liquid 15 milliLiter(s) Oral Mucosa every 12 hours  chlorhexidine 2% Cloths 1 Application(s) Topical daily  dexMEDEtomidine Infusion 0.2 MICROgram(s)/kG/Hr (4.31 mL/Hr) IV Continuous <Continuous>  dextrose 40% Gel 15 Gram(s) Oral once  dextrose 40% Gel 15 Gram(s) Oral once  dextrose 5%. 1000 milliLiter(s) (50 mL/Hr) IV Continuous <Continuous>  dextrose 5%. 1000 milliLiter(s) (100 mL/Hr) IV Continuous <Continuous>  dextrose 50% Injectable 25 Gram(s) IV Push once  dextrose 50% Injectable 12.5 Gram(s) IV Push once  dextrose 50% Injectable 25 Gram(s) IV Push once  diltiazem    Tablet 30 milliGRAM(s) Oral four times a day  epoetin danilo-epbx (RETACRIT) Injectable 82484 Unit(s) IV Push <User Schedule>  glucagon  Injectable 1 milliGRAM(s) IntraMuscular once  insulin lispro (ADMELOG) corrective regimen sliding scale   SubCutaneous every 6 hours  norepinephrine Infusion 0.02 MICROgram(s)/kG/Min (1.62 mL/Hr) IV Continuous <Continuous>    MEDICATIONS  (PRN):  acetaminophen   Tablet .. 1000 milliGRAM(s) Oral every 6 hours PRN Temp greater or equal to 38C (100.4F), Mild Pain (1 - 3), Moderate Pain (4 - 6)  calamine/zinc oxide Lotion 1 Application(s) Topical three times a day PRN Itching      Allergies    lisinopril (Other)  opioid-like analgesics (Other)    Intolerances        Review of Systems: *pt minimally verbal to nonverbal, unable to obtain ROS         Vital Signs Last 24 Hrs  T(C): 37.4 (22 Jul 2021 12:00), Max: 38.6 (22 Jul 2021 00:00)  T(F): 99.3 (22 Jul 2021 12:00), Max: 101.5 (22 Jul 2021 00:00)  HR: 59 (22 Jul 2021 13:00) (55 - 108)  BP: 106/29 (22 Jul 2021 13:00) (85/25 - 153/56)  BP(mean): 46 (22 Jul 2021 13:00) (39 - 98)  RR: 18 (22 Jul 2021 13:00) (14 - 27)  SpO2: 98% (22 Jul 2021 13:00) (94% - 100%)    PHYSICAL EXAM:    Constitutional: NAD  HEENT: EOMI, throat clear  Neck: No LAD, supple  Respiratory: +intubated   Cardiovascular: S1 and S2, RRR, no M  Gastrointestinal: BS+, soft, NT/ND, neg HSM,  Extremities: No peripheral edema, neg clubbing, cyanosis  Vascular: 2+ peripheral pulses  Neurological: A/O x 0  Psychiatric: confused   Skin: No rashes      LABS:                        7.7    20.00 )-----------( 312      ( 22 Jul 2021 06:52 )             25.2     07-22    134<L>  |  94<L>  |  42<H>  ----------------------------<  99  4.6   |  22  |  5.90<H>    Ca    8.5      22 Jul 2021 02:39  Phos  5.6     07-22  Mg     1.90     07-22    TPro  5.1<L>  /  Alb  2.3<L>  /  TBili  0.8  /  DBili  x   /  AST  33  /  ALT  8   /  AlkPhos  115  07-22    PT/INR - ( 21 Jul 2021 16:27 )   PT: 15.4 sec;   INR: 1.36 ratio         PTT - ( 22 Jul 2021 02:39 )  PTT:35.6 sec      RADIOLOGY & ADDITIONAL TESTS:

## 2021-07-22 NOTE — PROGRESS NOTE ADULT - SUBJECTIVE AND OBJECTIVE BOX
CARDIOLOGY ATTENDING    tele - SR    S: remains intubated; ros limited.     Review of Systems:   Constitutional: [ ] fevers, [ ] chills.   Skin: [ ] dry skin. [ ] rashes.  Psychiatric: [ ] depression, [ ] anxiety.   Gastrointestinal: [ ] BRBPR, [ ] melena.   Neurological: [ ] confusion. [ ] seizures. [ ] shuffling gait.   Ears,Nose,Mouth and Throat: [ ] ear pain [ ] sore throat.   Eyes: [ ] diplopia.   Respiratory: [ ] hemoptysis. [ ] shortness of breath  Cardiovascular: See HPI above  Hematologic/Lymphatic: [ ] anemia. [ ] painful nodes. [ ] prolonged bleeding.   Genitourinary: [ ] hematuria. [ ] flank pain.   Endocrine: [ ] significant change in weight. [ ] intolerance to heat and cold.     Review of systems [ ] otherwise negative, [x ] otherwise unable to obtain    FH: no family history of sudden cardiac death in first degree relatives    SH: [ ] tobacco, [ ] alcohol, [ ] drugs      acetaminophen   Tablet .. 1000 milliGRAM(s) Oral every 6 hours PRN  atorvastatin 40 milliGRAM(s) Oral at bedtime  calamine/zinc oxide Lotion 1 Application(s) Topical three times a day PRN  chlorhexidine 0.12% Liquid 15 milliLiter(s) Oral Mucosa every 12 hours  chlorhexidine 2% Cloths 1 Application(s) Topical daily  dexMEDEtomidine Infusion 0.2 MICROgram(s)/kG/Hr IV Continuous <Continuous>  dextrose 40% Gel 15 Gram(s) Oral once  dextrose 40% Gel 15 Gram(s) Oral once  dextrose 5%. 1000 milliLiter(s) IV Continuous <Continuous>  dextrose 5%. 1000 milliLiter(s) IV Continuous <Continuous>  dextrose 50% Injectable 25 Gram(s) IV Push once  dextrose 50% Injectable 12.5 Gram(s) IV Push once  dextrose 50% Injectable 25 Gram(s) IV Push once  diltiazem    Tablet 30 milliGRAM(s) Oral four times a day  epoetin danilo-epbx (RETACRIT) Injectable 56831 Unit(s) IV Push <User Schedule>  glucagon  Injectable 1 milliGRAM(s) IntraMuscular once  insulin lispro (ADMELOG) corrective regimen sliding scale   SubCutaneous every 6 hours  norepinephrine Infusion 0.02 MICROgram(s)/kG/Min IV Continuous <Continuous>                            7.8    20.86 )-----------( 341      ( 22 Jul 2021 15:31 )             25.6       07-22    134<L>  |  94<L>  |  42<H>  ----------------------------<  99  4.6   |  22  |  5.90<H>    Ca    8.5      22 Jul 2021 02:39  Phos  5.6     07-22  Mg     1.90     07-22    TPro  5.1<L>  /  Alb  2.3<L>  /  TBili  0.8  /  DBili  x   /  AST  33  /  ALT  8   /  AlkPhos  115  07-22      CARDIAC MARKERS ( 22 Jul 2021 02:39 )  x     / x     / 610 U/L / x     / x      CARDIAC MARKERS ( 21 Jul 2021 18:08 )  x     / x     / 633 U/L / x     / x            T(C): 37.4 (07-22-21 @ 12:00), Max: 38.6 (07-22-21 @ 00:00)  HR: 62 (07-22-21 @ 15:00) (55 - 108)  BP: 95/364 (07-22-21 @ 15:00) (85/25 - 153/56)  RR: 24 (07-22-21 @ 15:00) (14 - 27)  SpO2: 100% (07-22-21 @ 15:00) (97% - 100%)  Wt(kg): --    I&O's Summary    21 Jul 2021 07:01  -  22 Jul 2021 07:00  --------------------------------------------------------  IN: 265.4 mL / OUT: 0 mL / NET: 265.4 mL    22 Jul 2021 07:01  -  22 Jul 2021 15:57  --------------------------------------------------------  IN: 204.6 mL / OUT: 0 mL / NET: 204.6 mL          General: intubated; sedated   Head: Normocephalic and atraumatic.   Neck: No JVD. No bruits. Supple. Does not appear to be enlarged.   Cardiovascular: + S1,S2 ; RRR Soft systolic murmur at the left lower sternal border. No rubs noted.    Lungs: decreased BS BL  Abdomen: + BS, soft. Non tender. Non distended. No rebound. No guarding.   Extremities: No clubbing/cyanosis/edema.   Neurologic: unable to assess  Skin: Warm and moist. The patient's skin has normal elasticity and good skin turgor.   Psychiatric: unable to assess   Musculoskeletal: unable to assess    DATA  < from: Transthoracic Echocardiogram (07.07.21 @ 18:17) >  ------------------------------------------------------------------------  CONCLUSIONS:  1. Mitral annular calcification, otherwise normal mitral  valve. Minimal mitral regurgitation.  2. Endocardium not well visualized; grossly normal left  ventricular systolic function.  3. Right ventricular enlargement with decreased right  ventricular systolic function.  4. Inadequate tricuspid regurgitation Doppler envelope  precludes estimation of RVSP.  ------------------------------------------------------------------------  Confirmed on  7/7/2021 - 21:16:20 by Osvaldo Bertrand M.D.,  [facility], [facility]ASE    < end of copied text >    Tele: SR, 3 beats NSVT     A/P) 71 y/o male PMH CAD s/p CABG and old PCI, AFL x 1 year on coumadin, HTN, hyperlipidemia, DM, ESRD on HD, and hypothyroidism originally admitted to [facility] with GIB, transferred to [facility], found to have septic shock and MRSA bacteremia.     -Events noted; pt. s/p PEA arrest   -Repeat TTE with only mild segmental LV dysfunction in the inferoseptum  -Given negative CPK, do not suspect acs  -Suspect elevated troponin in the setting of demand ischemia from underlying septic shock  -Pressor support per MICU  -Monitor MS - follow up neuro   -EP follow up appreciated   -Given MRSA bacteremia, TRUMAN performed demonstrating MV endocarditis - discussed with CTS Dr. Pierce - pt. not a surgical candidate at this time   -Abx per ID  -Monitor BCx   -CTH head noted - pt. with cva - ac was restarted but now held due to dropping h/h   -Right thigh hematoma noted on CT scan - appreciate vascular evaluation - no surgical intervention required by vascular at this time       Kalie Lau MD

## 2021-07-22 NOTE — PROGRESS NOTE ADULT - SUBJECTIVE AND OBJECTIVE BOX
Nephrology Followup Note - [phone] - Dr Mendiola / Dr Knight / Dr Macias / Dr Arroyo / Dr Mackey / Dr Bowen / Dr Esposito / Dr Calle  Pt seen and examined at bedside  No acute events overnight. Pt intubated and on pressor support.     Allergies:  lisinopril (Other)  opioid-like analgesics (Other)    Hospital Medications:   MEDICATIONS  (STANDING):  atorvastatin 40 milliGRAM(s) Oral at bedtime  chlorhexidine 0.12% Liquid 15 milliLiter(s) Oral Mucosa every 12 hours  chlorhexidine 2% Cloths 1 Application(s) Topical daily  dexMEDEtomidine Infusion 0.2 MICROgram(s)/kG/Hr (4.31 mL/Hr) IV Continuous <Continuous>  dextrose 40% Gel 15 Gram(s) Oral once  dextrose 40% Gel 15 Gram(s) Oral once  dextrose 5%. 1000 milliLiter(s) (50 mL/Hr) IV Continuous <Continuous>  dextrose 5%. 1000 milliLiter(s) (100 mL/Hr) IV Continuous <Continuous>  dextrose 50% Injectable 25 Gram(s) IV Push once  dextrose 50% Injectable 12.5 Gram(s) IV Push once  dextrose 50% Injectable 25 Gram(s) IV Push once  diltiazem    Tablet 30 milliGRAM(s) Oral four times a day  epoetin danilo-epbx (RETACRIT) Injectable 18656 Unit(s) IV Push <User Schedule>  glucagon  Injectable 1 milliGRAM(s) IntraMuscular once  insulin lispro (ADMELOG) corrective regimen sliding scale   SubCutaneous every 6 hours  norepinephrine Infusion 0.02 MICROgram(s)/kG/Min (1.62 mL/Hr) IV Continuous <Continuous>      VITALS:  T(F): 99.3 (07-22-21 @ 12:00), Max: 101.5 (07-22-21 @ 00:00)  HR: 59 (07-22-21 @ 13:00)  BP: 106/29 (07-22-21 @ 13:00)  RR: 18 (07-22-21 @ 13:00)  SpO2: 98% (07-22-21 @ 13:00)  Wt(kg): --    07-21 @ 07:01  -  07-22 @ 07:00  --------------------------------------------------------  IN: 265.4 mL / OUT: 0 mL / NET: 265.4 mL    07-22 @ 07:01  -  07-22 @ 14:38  --------------------------------------------------------  IN: 204.6 mL / OUT: 0 mL / NET: 204.6 mL        PHYSICAL EXAM:  Constitutional: NAD  HEENT: ETT  Neck: No JVD  Respiratory: CTAB, no wheezes, rales or rhonchi  Cardiovascular: S1, S2, RRR  Gastrointestinal: BS+, soft, NT/ND  Extremities: No cyanosis or clubbing. +peripheral edema  Neurological: unresponsive   : No CVA tenderness. No wagner.   Skin: No rashes  Vascular Access: LUE AV access +thrill and bruit.     LABS:  07-22    134<L>  |  94<L>  |  42<H>  ----------------------------<  99  4.6   |  22  |  5.90<H>    Ca    8.5      22 Jul 2021 02:39  Phos  5.6     07-22  Mg     1.90     07-22    TPro  5.1<L>  /  Alb  2.3<L>  /  TBili  0.8  /  DBili      /  AST  33  /  ALT  8   /  AlkPhos  115  07-22    Creatinine Trend: 5.90 <--, 4.75 <--, 6.78 <--, 5.79 <--, 5.45 <--, 4.43 <--, 6.22 <--, 4.99 <--                        7.7    20.00 )-----------( 312      ( 22 Jul 2021 06:52 )             25.2     Urine Studies:      RADIOLOGY & ADDITIONAL STUDIES:

## 2021-07-22 NOTE — PROGRESS NOTE ADULT - SUBJECTIVE AND OBJECTIVE BOX
Date of service 7/22/21    EP     tele: AFib  prior PEA/Jay/Asystole arrest.  resuscitated and intubated  antibiotics for MRSA bacteremia.  CT head w/ interval hypodensities concerning for stroke.  remains intubated, sleeping today    Review of Systems:   Constitutional: [ ] fevers, [ ] chills.   Skin: [ ] dry skin. [ ] rashes.  Psychiatric: [ ] depression, [ ] anxiety.   Gastrointestinal: [ ] BRBPR, [ ] melena.   Neurological: [ ] confusion. [ ] seizures. [ ] shuffling gait.   Ears,Nose,Mouth and Throat: [ ] ear pain [ ] sore throat.   Eyes: [ ] diplopia.   Respiratory: [ ] hemoptysis. [ ] shortness of breath  Cardiovascular: See HPI above  Hematologic/Lymphatic: [ ] anemia. [ ] painful nodes. [ ] prolonged bleeding.   Genitourinary: [ ] hematuria. [ ] flank pain.   Endocrine: [ ] significant change in weight. [ ] intolerance to heat and cold.     Review of systems [ ] otherwise negative, [x ] otherwise unable to obtain    FH: no family history of sudden cardiac death in first degree relatives    SH: [ ] tobacco, [ ] alcohol, [ ] drugs    acetaminophen   Tablet .. 1000 milliGRAM(s) Oral every 6 hours PRN  atorvastatin 40 milliGRAM(s) Oral at bedtime  calamine/zinc oxide Lotion 1 Application(s) Topical three times a day PRN  chlorhexidine 0.12% Liquid 15 milliLiter(s) Oral Mucosa every 12 hours  chlorhexidine 2% Cloths 1 Application(s) Topical daily  dexMEDEtomidine Infusion 0.2 MICROgram(s)/kG/Hr IV Continuous <Continuous>  dextrose 40% Gel 15 Gram(s) Oral once  dextrose 40% Gel 15 Gram(s) Oral once  dextrose 5%. 1000 milliLiter(s) IV Continuous <Continuous>  dextrose 5%. 1000 milliLiter(s) IV Continuous <Continuous>  dextrose 50% Injectable 25 Gram(s) IV Push once  dextrose 50% Injectable 12.5 Gram(s) IV Push once  dextrose 50% Injectable 25 Gram(s) IV Push once  diltiazem    Tablet 30 milliGRAM(s) Oral four times a day  epoetin danilo-epbx (RETACRIT) Injectable 46159 Unit(s) IV Push <User Schedule>  glucagon  Injectable 1 milliGRAM(s) IntraMuscular once  insulin lispro (ADMELOG) corrective regimen sliding scale   SubCutaneous every 6 hours  norepinephrine Infusion 0.02 MICROgram(s)/kG/Min IV Continuous <Continuous>                          7.7    20.00 )-----------( 312      ( 22 Jul 2021 06:52 )             25.2   134<L>  |  94<L>  |  42<H>  ----------------------------<  99  4.6   |  22  |  5.90<H>    Ca    8.5      22 Jul 2021 02:39  Phos  5.6     07-22  Mg     1.90     07-22    TPro  5.1<L>  /  Alb  2.3<L>  /  TBili  0.8  /  DBili  x   /  AST  33  /  ALT  8   /  AlkPhos  115  07-22      CARDIAC MARKERS ( 22 Jul 2021 02:39 )  x     / x     / 610 U/L / x     / x      CARDIAC MARKERS ( 21 Jul 2021 18:08 )  x     / x     / 633 U/L / x     / x        T(C): 37.4 (07-22-21 @ 12:00), Max: 38.6 (07-22-21 @ 00:00)  HR: 59 (07-22-21 @ 13:00) (55 - 108)  BP: 106/29 (07-22-21 @ 13:00) (85/25 - 153/56)  RR: 18 (07-22-21 @ 13:00) (14 - 27)  SpO2: 98% (07-22-21 @ 13:00) (94% - 100%)    General: intubated, sleeping  Head: Normocephalic and atraumatic.  right facial droop  Neck: No JVD. No bruits. Supple. Does not appear to be enlarged.  Left IJ CVL.  Intubated.    Cardiovascular: + S1,S2 ; IRR Soft systolic murmur at the left lower sternal border. No rubs noted.    Lungs: coarse breath sounds bL.   Abdomen: + BS, soft. Non distended.   Extremities: limbs warm, edematous  Psychiatric: unable to assess due to sedation      A/P) 69 y/o male PMH CAD s/p CABG, PAF/AFL on coumadin, HTN, hyperlipidemia, DM, ESRD on HD, hypothyroidism a/w sepsis    -continue diltiazem for AF rate control.  -heparin infusion for secondary stroke prevention  -s/p PEA/jay/asystole arrest, secondary to critical illness and infection.  no pacemaker or ICD indicated.  -no further inpatient EP workup needed but will follow

## 2021-07-23 DIAGNOSIS — I21.9 ACUTE MYOCARDIAL INFARCTION, UNSPECIFIED: ICD-10-CM

## 2021-07-23 DIAGNOSIS — Z51.5 ENCOUNTER FOR PALLIATIVE CARE: ICD-10-CM

## 2021-07-23 LAB
ALBUMIN SERPL ELPH-MCNC: 2.6 G/DL — LOW (ref 3.3–5)
ALP SERPL-CCNC: 141 U/L — HIGH (ref 40–120)
ALT FLD-CCNC: 9 U/L — SIGNIFICANT CHANGE UP (ref 4–41)
ANION GAP SERPL CALC-SCNC: 19 MMOL/L — HIGH (ref 7–14)
AST SERPL-CCNC: 42 U/L — HIGH (ref 4–40)
BASOPHILS # BLD AUTO: 0.09 K/UL — SIGNIFICANT CHANGE UP (ref 0–0.2)
BASOPHILS NFR BLD AUTO: 0.4 % — SIGNIFICANT CHANGE UP (ref 0–2)
BILIRUB SERPL-MCNC: 1.1 MG/DL — SIGNIFICANT CHANGE UP (ref 0.2–1.2)
BLOOD GAS ARTERIAL COMPREHENSIVE RESULT: SIGNIFICANT CHANGE UP
BUN SERPL-MCNC: 27 MG/DL — HIGH (ref 7–23)
CALCIUM SERPL-MCNC: 8.7 MG/DL — SIGNIFICANT CHANGE UP (ref 8.4–10.5)
CHLORIDE SERPL-SCNC: 93 MMOL/L — LOW (ref 98–107)
CK SERPL-CCNC: 599 U/L — HIGH (ref 30–200)
CO2 SERPL-SCNC: 22 MMOL/L — SIGNIFICANT CHANGE UP (ref 22–31)
CREAT SERPL-MCNC: 4.11 MG/DL — HIGH (ref 0.5–1.3)
EOSINOPHIL # BLD AUTO: 0.03 K/UL — SIGNIFICANT CHANGE UP (ref 0–0.5)
EOSINOPHIL NFR BLD AUTO: 0.1 % — SIGNIFICANT CHANGE UP (ref 0–6)
GLUCOSE BLDC GLUCOMTR-MCNC: 142 MG/DL — HIGH (ref 70–99)
GLUCOSE BLDC GLUCOMTR-MCNC: 149 MG/DL — HIGH (ref 70–99)
GLUCOSE BLDC GLUCOMTR-MCNC: 163 MG/DL — HIGH (ref 70–99)
GLUCOSE BLDC GLUCOMTR-MCNC: 246 MG/DL — HIGH (ref 70–99)
GLUCOSE SERPL-MCNC: 127 MG/DL — HIGH (ref 70–99)
GRAM STN FLD: SIGNIFICANT CHANGE UP
HCT VFR BLD CALC: 26 % — LOW (ref 39–50)
HGB BLD-MCNC: 7.9 G/DL — LOW (ref 13–17)
IANC: 18.58 K/UL — HIGH (ref 1.5–8.5)
IMM GRANULOCYTES NFR BLD AUTO: 2.3 % — HIGH (ref 0–1.5)
LYMPHOCYTES # BLD AUTO: 0.86 K/UL — LOW (ref 1–3.3)
LYMPHOCYTES # BLD AUTO: 3.9 % — LOW (ref 13–44)
MAGNESIUM SERPL-MCNC: 2 MG/DL — SIGNIFICANT CHANGE UP (ref 1.6–2.6)
MCHC RBC-ENTMCNC: 27.4 PG — SIGNIFICANT CHANGE UP (ref 27–34)
MCHC RBC-ENTMCNC: 30.4 GM/DL — LOW (ref 32–36)
MCV RBC AUTO: 90.3 FL — SIGNIFICANT CHANGE UP (ref 80–100)
METHOD TYPE: SIGNIFICANT CHANGE UP
MONOCYTES # BLD AUTO: 2.14 K/UL — HIGH (ref 0–0.9)
MONOCYTES NFR BLD AUTO: 9.6 % — SIGNIFICANT CHANGE UP (ref 2–14)
NEUTROPHILS # BLD AUTO: 18.58 K/UL — HIGH (ref 1.8–7.4)
NEUTROPHILS NFR BLD AUTO: 83.7 % — HIGH (ref 43–77)
NRBC # BLD: 0 /100 WBCS — SIGNIFICANT CHANGE UP
NRBC # FLD: 0.07 K/UL — HIGH
PHOSPHATE SERPL-MCNC: 4.5 MG/DL — SIGNIFICANT CHANGE UP (ref 2.5–4.5)
PLATELET # BLD AUTO: 398 K/UL — SIGNIFICANT CHANGE UP (ref 150–400)
POTASSIUM SERPL-MCNC: 4.2 MMOL/L — SIGNIFICANT CHANGE UP (ref 3.5–5.3)
POTASSIUM SERPL-SCNC: 4.2 MMOL/L — SIGNIFICANT CHANGE UP (ref 3.5–5.3)
PROT SERPL-MCNC: 5.8 G/DL — LOW (ref 6–8.3)
RBC # BLD: 2.88 M/UL — LOW (ref 4.2–5.8)
RBC # FLD: 21.3 % — HIGH (ref 10.3–14.5)
S MARCESCENS DNA BLD POS NAA+NON-PROBE: SIGNIFICANT CHANGE UP
SODIUM SERPL-SCNC: 134 MMOL/L — LOW (ref 135–145)
SPECIMEN SOURCE: SIGNIFICANT CHANGE UP
TROPONIN T, HIGH SENSITIVITY RESULT: 3208 NG/L — CRITICAL HIGH
VANCOMYCIN FLD-MCNC: 24.2 UG/ML — SIGNIFICANT CHANGE UP
WBC # BLD: 22.2 K/UL — HIGH (ref 3.8–10.5)
WBC # FLD AUTO: 22.2 K/UL — HIGH (ref 3.8–10.5)

## 2021-07-23 PROCEDURE — 99232 SBSQ HOSP IP/OBS MODERATE 35: CPT

## 2021-07-23 PROCEDURE — 99223 1ST HOSP IP/OBS HIGH 75: CPT | Mod: GC

## 2021-07-23 RX ORDER — NOREPINEPHRINE BITARTRATE/D5W 8 MG/250ML
0.05 PLASTIC BAG, INJECTION (ML) INTRAVENOUS
Qty: 8 | Refills: 0 | Status: DISCONTINUED | OUTPATIENT
Start: 2021-07-23 | End: 2021-07-27

## 2021-07-23 RX ORDER — NOREPINEPHRINE BITARTRATE/D5W 8 MG/250ML
0.05 PLASTIC BAG, INJECTION (ML) INTRAVENOUS
Qty: 8 | Refills: 0 | Status: DISCONTINUED | OUTPATIENT
Start: 2021-07-23 | End: 2021-07-23

## 2021-07-23 RX ORDER — PIPERACILLIN AND TAZOBACTAM 4; .5 G/20ML; G/20ML
3.38 INJECTION, POWDER, LYOPHILIZED, FOR SOLUTION INTRAVENOUS ONCE
Refills: 0 | Status: COMPLETED | OUTPATIENT
Start: 2021-07-23 | End: 2021-07-23

## 2021-07-23 RX ORDER — PIPERACILLIN AND TAZOBACTAM 4; .5 G/20ML; G/20ML
3.38 INJECTION, POWDER, LYOPHILIZED, FOR SOLUTION INTRAVENOUS EVERY 12 HOURS
Refills: 0 | Status: DISCONTINUED | OUTPATIENT
Start: 2021-07-23 | End: 2021-07-24

## 2021-07-23 RX ORDER — VANCOMYCIN HCL 1 G
750 VIAL (EA) INTRAVENOUS
Refills: 0 | Status: DISCONTINUED | OUTPATIENT
Start: 2021-07-23 | End: 2021-08-01

## 2021-07-23 RX ORDER — MIDODRINE HYDROCHLORIDE 2.5 MG/1
10 TABLET ORAL EVERY 8 HOURS
Refills: 0 | Status: DISCONTINUED | OUTPATIENT
Start: 2021-07-23 | End: 2021-07-24

## 2021-07-23 RX ADMIN — Medication 30 MILLIGRAM(S): at 23:36

## 2021-07-23 RX ADMIN — MIDODRINE HYDROCHLORIDE 10 MILLIGRAM(S): 2.5 TABLET ORAL at 19:33

## 2021-07-23 RX ADMIN — PIPERACILLIN AND TAZOBACTAM 200 GRAM(S): 4; .5 INJECTION, POWDER, LYOPHILIZED, FOR SOLUTION INTRAVENOUS at 19:30

## 2021-07-23 RX ADMIN — Medication 30 MILLIGRAM(S): at 05:05

## 2021-07-23 RX ADMIN — PANTOPRAZOLE SODIUM 40 MILLIGRAM(S): 20 TABLET, DELAYED RELEASE ORAL at 11:42

## 2021-07-23 RX ADMIN — Medication 30 MILLIGRAM(S): at 00:03

## 2021-07-23 RX ADMIN — ATORVASTATIN CALCIUM 40 MILLIGRAM(S): 80 TABLET, FILM COATED ORAL at 19:37

## 2021-07-23 RX ADMIN — Medication 1: at 05:04

## 2021-07-23 RX ADMIN — Medication 30 MILLIGRAM(S): at 11:42

## 2021-07-23 RX ADMIN — Medication 8.08 MICROGRAM(S)/KG/MIN: at 15:42

## 2021-07-23 RX ADMIN — MIDODRINE HYDROCHLORIDE 10 MILLIGRAM(S): 2.5 TABLET ORAL at 10:35

## 2021-07-23 RX ADMIN — Medication 30 MILLIGRAM(S): at 17:46

## 2021-07-23 RX ADMIN — Medication 2: at 23:35

## 2021-07-23 RX ADMIN — Medication 1.62 MICROGRAM(S)/KG/MIN: at 07:25

## 2021-07-23 RX ADMIN — CHLORHEXIDINE GLUCONATE 1 APPLICATION(S): 213 SOLUTION TOPICAL at 11:51

## 2021-07-23 RX ADMIN — CHLORHEXIDINE GLUCONATE 15 MILLILITER(S): 213 SOLUTION TOPICAL at 05:06

## 2021-07-23 NOTE — PROGRESS NOTE ADULT - SUBJECTIVE AND OBJECTIVE BOX
INTERVAL HPI/OVERNIGHT EVENTS:    ros unobtainable in icu   brown stools documented    MEDICATIONS  (STANDING):  atorvastatin 40 milliGRAM(s) Oral at bedtime  chlorhexidine 0.12% Liquid 15 milliLiter(s) Oral Mucosa every 12 hours  chlorhexidine 2% Cloths 1 Application(s) Topical daily  dextrose 40% Gel 15 Gram(s) Oral once  dextrose 40% Gel 15 Gram(s) Oral once  dextrose 5%. 1000 milliLiter(s) (50 mL/Hr) IV Continuous <Continuous>  dextrose 5%. 1000 milliLiter(s) (100 mL/Hr) IV Continuous <Continuous>  dextrose 50% Injectable 25 Gram(s) IV Push once  dextrose 50% Injectable 12.5 Gram(s) IV Push once  dextrose 50% Injectable 25 Gram(s) IV Push once  diltiazem    Tablet 30 milliGRAM(s) Oral four times a day  epoetin danilo-epbx (RETACRIT) Injectable 47348 Unit(s) IV Push <User Schedule>  glucagon  Injectable 1 milliGRAM(s) IntraMuscular once  insulin lispro (ADMELOG) corrective regimen sliding scale   SubCutaneous every 6 hours  midodrine 10 milliGRAM(s) Oral every 8 hours  norepinephrine Infusion 0.02 MICROgram(s)/kG/Min (1.62 mL/Hr) IV Continuous <Continuous>  pantoprazole  Injectable 40 milliGRAM(s) IV Push daily    MEDICATIONS  (PRN):  acetaminophen   Tablet .. 1000 milliGRAM(s) Oral every 6 hours PRN Temp greater or equal to 38C (100.4F), Mild Pain (1 - 3), Moderate Pain (4 - 6)  calamine/zinc oxide Lotion 1 Application(s) Topical three times a day PRN Itching      Allergies    lisinopril (Other)  opioid-like analgesics (Other)    Intolerances        Review of Systems: *intubated/unobtainable      Vital Signs Last 24 Hrs  T(C): 37.1 (23 Jul 2021 08:00), Max: 37.9 (22 Jul 2021 17:05)  T(F): 98.7 (23 Jul 2021 08:00), Max: 100.2 (22 Jul 2021 17:05)  HR: 91 (23 Jul 2021 11:45) (59 - 93)  BP: 100/53 (23 Jul 2021 11:45) (84/68 - 234/209)  BP(mean): 57 (23 Jul 2021 11:45) (46 - 215)  RR: 25 (23 Jul 2021 11:45) (9 - 31)  SpO2: 98% (23 Jul 2021 11:45) (95% - 100%)    PHYSICAL EXAM:    Constitutional: NAD  HEENT: EOMI, throat clear  Neck: No LAD, supple  Respiratory: intubated   Cardiovascular: S1 and S2, RRR, no M  Gastrointestinal: BS+, soft, NT/ND, neg HSM,  Extremities: No peripheral edema, neg clubbing, cyanosis  Vascular: 2+ peripheral pulses  Neurological: A/O x 0  Psychiatric: confused      LABS:                        7.9    22.20 )-----------( 398      ( 23 Jul 2021 01:31 )             26.0     07-23    134<L>  |  93<L>  |  27<H>  ----------------------------<  127<H>  4.2   |  22  |  4.11<H>    Ca    8.7      23 Jul 2021 01:31  Phos  4.5     07-23  Mg     2.00     07-23    TPro  5.8<L>  /  Alb  2.6<L>  /  TBili  1.1  /  DBili  x   /  AST  42<H>  /  ALT  9   /  AlkPhos  141<H>  07-23    PT/INR - ( 21 Jul 2021 16:27 )   PT: 15.4 sec;   INR: 1.36 ratio         PTT - ( 22 Jul 2021 02:39 )  PTT:35.6 sec      RADIOLOGY & ADDITIONAL TESTS:

## 2021-07-23 NOTE — PROGRESS NOTE ADULT - ASSESSMENT
Assessment and Plan: 	  71 yo M with PMhx of CAD s/p CABG (course at that time complicated by pleural effusion requiring chest tubes), ESRD on HD (MWF), DMII, HTN, afib on coumadin, and anemia transferred from [facility] for evaluation of chest pain after HD and GIB s/p 2U PRBC transfusion and aflutter/afib with RVR managed with metoprolol. Admitted to MICU for hypotension dependent on pressors likely 2/2 to sepsis. In MICU, patient had a cardiac arrest on 7/13 requiring CPR, 3x epi, and 2x shock (for VTach). Course now complicated by recent drop in hgb requiring pRBCs. CT of leg shows hematoma on right adductor.    #Neuro  - Off of sedation. Patient opens his eyes on verbal command. Not alert or oriented at this time  - CT head shows foci of low density in the R anterolateral frontal lobe and L posterior temporal lobe concerning for acute infarcts.  - Mechanism likely cardioembolic in setting of patient with known atrial fibrillation with cardiac arrest vs septic emboli  - per neurology, can consider MRI brain w/o contrast. However, unlikely to  since patient has known atrial fibrillation and will need to be continued of full dose anticoagulation.  - Avoid hypotension, keep -180 for neuroprotection  - c/w Atorvastatin 40MG QHS for secondary stroke prevention.    #Cardiovascular  - Cardiac arrest: Patient had asystole night of 7/13 and CPR was started. S/p epi x3 and shock x2 with return to NSR. Cards is following  - TTE on 7/13 showed Mild mitral regurgitation with biventricular dysfunction of LV and RV systolic function. Endocardium was not well visualized, Hypokinesis of the basal inferior wall and basal inferoseptum.  - TRUMAN on 7/19 showed a large echodensity consisted with endocarditis and PFO  - Cardiology spoke with CTS, no surgery at this time. (Dr. Herzog)  - c/w Levophed wean as tolerated.   - BP goal 140-180 to maintain cerebral perfusion after stroke  - Will consider switching to midodrine once HR stable  - Troponins 2400, CKMB 7.1. Troponin stable from prior to cardiac arrest. They are likely 2/2 chronic condition/ESRD on HD vs Hypotensive event. Stable, no need to trend    A. Fib  - on coumadin at home,  - holding heparin in setting of recent drop in hgb  - From neurology stand point, heparin is sufficient for now. Will need to be switched to DOAC in the future if kidney function allows  - 7/11: tried to transition heparin gtt to eliquis but pt is not a candidate due to his acute MIGUEL; back on heparin gtt  - c/ diltiazem for rate control    #Respiratory  - Loculated Pleural effusions: chest tube removed  - pleural fluid cx: (-); transudative.  - intubated and on ventilator (24/400/5/40). F/u ABG and adjust as necessary  - Will trial on CPAP again today  - CXR s/p chest tube removal showed no evidence of atelectasis. Demonstrated loculated left pleural effusion decreased in size and new hazy right lower lung opacity which could be due to a small layering right pleural effusion with associated passive atelectasis, atelectasis of other cause, and/or pneumonia.  - abx as below    #GI/Nutrition:  - NPO with tube feed. Holding at this time in preparation for extubation  - Was c/o abdominal pain on presentation, bedside POCUS didn't show ascites. No abdominal pain at this time. Continue to monitor.   - negative occult bleeding, hgb stable  - Per GI, recent decrease in hgb less likely to be a GIB, appreciate recs  - FOBT negative  - Zofran PRN for Nausea.    #/Renal  - ESRD: HD on MWF. Most recent Scr 4.6. Potassium is stable.   - Last dialysis session 7/20. Per nephro, will receive dialysis today  - Electrolytes stable  - c/w Epo 20k tiw with HD  - Worsening swelling of scrotum, will obtain scrotal US      #Skin  - HD Access: fistula in left upper arm  - Improving erythematous and tender on right wrist. Continue to monitor. RUE US neg for thrombus  - US of fistula showed patent fistula with no abscess  - Arrow is patent in right upper extremity.    #ID  - S aureus PCR: (+),  MRSA PCR negative  - BCx on 7/7 now growing MRSA: restarted on vanc (7/11- )  - Vanc 7/19 was 21.8. Will give 500 after dialysis session  - Surveillance blood Cx have been negative for 48hours since 7/12, no longer need to continue    #Endocrine  - DMII: c/w Lispro SS. Monitor glucose.      #Hematologic/DVT ppx  - monitor PTT and cbc q12  - Will hold heparin in setting of recent decrease in hgb. Required another unit of blood o/n  - Patient complaining of right thigh pain. CTA demonstrated A moderate-sized hematoma in the right adductor muscle group.  - Contact surgery regarding the hematoma, no surgery at this time. Recommended ACE wrap at this time.  - PRN dilaudid for pain control  - Leukocytosis in 20s this morning. Will obtain Cx if elevated in next blood draw    #Ethics  - full code as per wife- full code as per wife Assessment and Plan: 	  71 yo M with PMhx of CAD s/p CABG (course at that time complicated by pleural effusion requiring chest tubes), ESRD on HD (MWF), DMII, HTN, afib on coumadin, and anemia transferred from [facility] for evaluation of chest pain after HD and GIB s/p 2U PRBC transfusion and aflutter/afib with RVR managed with metoprolol. Admitted to MICU for hypotension dependent on pressors likely 2/2 to sepsis. In MICU, patient had a cardiac arrest on 7/13 requiring CPR, 3x epi, and 2x shock (for VTach). Course now complicated by recent drop in hgb requiring pRBCs. CT of leg shows hematoma on right adductor.    #Neuro  - Off of sedation. Patient opens his eyes on verbal command and alert  - CT head shows foci of low density in the R anterolateral frontal lobe and L posterior temporal lobe concerning for acute infarcts.  - Mechanism likely cardioembolic in setting of patient with known atrial fibrillation with cardiac arrest vs septic emboli  - per neurology, can consider MRI brain w/o contrast. However, unlikely to  since patient has known atrial fibrillation and will need to be continued of full dose anticoagulation. Currently holding given recent bleeding  - Avoid hypotension, keep -180 for neuroprotection  - c/w Atorvastatin 40MG QHS for secondary stroke prevention.    #Cardiovascular  - Cardiac arrest: Patient had asystole night of 7/13 and CPR was started. S/p epi x3 and shock x2 with return to NSR. Cards is following  - TTE on 7/13 showed Mild mitral regurgitation with biventricular dysfunction of LV and RV systolic function. Endocardium was not well visualized, Hypokinesis of the basal inferior wall and basal inferoseptum.  - TRUMAN on 7/19 showed a large echodensity consisted with endocarditis and PFO  - Cardiology spoke with CTS, no surgery at this time. (Dr. Herzog)  - c/w Levophed wean as tolerated  - Will start midodrine as HR stable to help assist weaning off of pressors   - BP goal is >100 systolic  - Troponins 3200 today. They are likely 2/2 chronic condition/ESRD on HD vs Hypotensive event. Stable, no need to trend    A. Fib  - on coumadin at home,  - holding heparin in setting of recent drop in hgb  - From neurology stand point, heparin is sufficient for now. Will need to be switched to DOAC in the future if kidney function allows  - 7/11: tried to transition heparin gtt to eliquis but pt is not a candidate due to his acute MIGUEL; back on heparin gtt  - c/ diltiazem for rate control    #Respiratory  - Loculated Pleural effusions: chest tube removed  - pleural fluid cx: (-); transudative.  - intubated and on ventilator (24/400/5/40). Tolerating CPAP well. Will extubate today when family is nearby  - CXR s/p chest tube removal showed no evidence of atelectasis. Demonstrated loculated left pleural effusion decreased in size and new hazy right lower lung opacity which could be due to a small layering right pleural effusion with associated passive atelectasis, atelectasis of other cause, and/or pneumonia.  - abx as below    #GI/Nutrition:  - NPO with tube feed. Holding at this time in preparation for extubation  - Was c/o abdominal pain on presentation, bedside POCUS didn't show ascites. No abdominal pain at this time. Continue to monitor.   - negative occult bleeding, hgb stable  - Per GI, recent decrease in hgb less likely to be a GIB, appreciate recs  - FOBT negative  - Zofran PRN for Nausea.    #/Renal  - ESRD: HD on MWF. Most recent Scr 4.6. Potassium is stable.   - Last dialysis session 7/23. Will likely not receive dialysis today.   - Recommend removing 2-3L of fluids as patient is net positive for I&Os. Discussed with nephro  - Electrolytes stable  - c/w Epo 20k tiw with HD  - Scotal US demonstrates swelling in the scrotum, improving with dialysis yesterday. likely in setting of fluid overload. Manage as above      #Skin  - HD Access: fistula in left upper arm  - Improving erythematous and tender on right wrist. Continue to monitor. RUE US neg for thrombus  - US of fistula showed patent fistula with no abscess  - Arrow is patent in right upper extremity.  - Can remove the central line and add a peripheral    #ID  - S aureus PCR: (+),  MRSA PCR negative  - BCx on 7/7 now growing MRSA: restarted on vanc (7/11- )  - Vanc 7/23 was 24 today, will hold for today. Repeat tomorrow  - WBC is trending upwards to 22. No fever o/n. Will repeat tomorrow after removal of central line  - Surveillance blood Cx have been negative for 48hours since 7/12, no longer need to continue    #Endocrine  - DMII: c/w Lispro SS. Monitor glucose.      #Hematologic/DVT ppx  - monitor PTT and cbc q12  - Will hold heparin in setting of recent decrease in hgb. Required another unit of blood o/n  - Patient complaining of right thigh pain. CTA demonstrated A moderate-sized hematoma in the right adductor muscle group.  - Contact surgery regarding the hematoma, no surgery at this time. c/ with ACE wrap at this time.  - PRN dilaudid for pain control  - Leukocytosis as above    #Ethics  - full code as per wife- full code as per wife

## 2021-07-23 NOTE — CONSULT NOTE ADULT - REASON FOR ADMISSION
Chest pain and GIB

## 2021-07-23 NOTE — CONSULT NOTE ADULT - PROVIDER SPECIALTY LIST ADULT
Internal Medicine
Electrophysiology
MICU
Cardiology
Infectious Disease
Neurology
Pulmonology
Thoracic Surgery
Vascular Surgery
Gastroenterology
Nephrology
Palliative Care

## 2021-07-23 NOTE — PROGRESS NOTE ADULT - ASSESSMENT
71 yo M with PMhx of CAD s/p CABG (course at that time complicated by pleural effusion requiring chest tubes), ESRD on HD (MWF), DMII, HTN, afib on coumadin, and anemia presented from [facility] for evaluation of chest pain and GIB.       left sided pleural effusion: loculated and chronic:   CAD:  CABG:  ESRD:   DM:  HTN:  A fibrillation:   GI Bleed    7/7:    left sided pleural effusion: loculated and chronic: he has chr loculated pleural effusion on left side: He had chest tube placement in last June 2020 at another hospital: no chemistry is available but he had negative cytology at that time: This time the effusion seems slightly worse and has loculated effusion with pleural thickening: heis not SOB andhasbeen on roomair: I think , it at all, if this effusion needs to be dealt with : it is probably vats: will contact thoracic surgery : etiology not very clear as there is no previous chemistry: coult be exudative or transudatve: he is on HD and now it is a chr christiano effusion   CAD: cont per cards  CABG: cont current meds  ESRD: on HD   DM: monitor and control  HTN: controlled  A fibrillation: off ac:   GI Bleed: per gi :  DW pt and t eam1    7/8:    left sided pleural effusion:: fever: s/p left sided chest tube placement: cultures sent: however with pr criteria it seems transudative await serum LDH but pleural fluid LDH is low:  already started on broad spectrum antibiotics ID to see: await cultures:   CAD: cont per cards  CABG: cont current meds  ESRD: on HD   DM: monitor and control  HTN: controlled  A fibrillation: off ac:   GI Bleed: per gi :  DW pmd    7/9:    left sided pleural effusion:: fever: s/p left sided chest tube placement: cultures sent: however with pr criteria it seems borderline transudative and transudative by LDH criteria: the vulutres ahve been negative so far:  already started on broad spectrum antibiotics ID to see: await cultures: ? source of spesis  CAD: cont per cards  CABG: cont current meds  ESRD: on HD   DM: monitor and control  HTN: in shock: now on vasopressors:   A fibrillation: off ac:   GI Bleed: per gi :  DW pmd  micu team    7/11:      left sided pleural effusion:: fever: s/p left sided chest tube placement: cultures sent: however with pr criteria it seems borderline transudative and transudative by LDH criteria: the cultures have been negative so far:  already started on broad spectrum antibiotics : Has MSSA bactermia  CAD: cont per cards: still on vasopressors: wean as tolerated ? needs molly ro tule out IE : would defer to cards:   CABG: cont current meds  ESRD: on HD   DM: monitor and control  HTN: in shock: now on vasopressors:   A fibrillation: off ac:   GI Bleed: per gi :  DW pmd  micu team    7/12:    left sided pleural effusion:: fever: s/p left sided chest tube placement:- now removed: cultures sent: however with pr criteria it seems borderline transudative and transudative by LDH criteria: the cultures have been negative so far:  already started on broad spectrum antibiotics : Has MRSA bactermia: vanco restarted yesterday ? echo/molly  Ac hypercarbic resp fialure: needs to be watched closelyin MICU : needs bipap may need intubation" At this time he is alert and awake: and responds to simple questions:    CAD: cont per cards: still on vasopressors: wean as tolerated ? needs molly ro tule out IE : would defer to cards:   CABG: cont current meds  ESRD: on HD   DM: monitor and control  HTN: in shock: now on vasopressors:   A fibrillation: off ac:   GI Bleed: per gi :  DW pmd  cards and bedstaff    7/13:  left sided pleural effusion:: fever: s/p left sided chest tube placement:- now removed: cultures sent: however with pr criteria it seems borderline transudative and transudative by LDH criteria: the cultures have been negative so far:  already started on broad spectrum antibiotics : Has MRSA bacteremia vanco restarted yesterday ? echo/molly  Ac hypercarbic resp fialure: now intubated s/p acls protocol last night   CAD: cont per cards: still on vasopressors: wean as tolerated ? needs molly ro tule out IE : would defer to cards:   CABG: cont current meds  ESRD: on HD   DM: monitor and control  HTN: in shock: now on vasopressors:   A fibrillation: off ac:   GI Bleed: per gi :  LINDA  staff  pt is critically ill now:     7/14:  left sided pleural effusion:: fever: s/p left sided chest tube placement:- now removed: cultures sent: however with pr criteria it seems borderline transudative and transudative by LDH criteria: the cultures have been negative so far:  already started on broad spectrum antibiotics : Has MRSA bacteremia vanco restarted yesterday now for  echo/molly  Ac hypercarbic resp fialure: now intubated s/p acls protocol last night -s/p cardiac arrest: ROSC 10 mts:   CAD: cont per cards: still on vasopressors: wean as tolerated ? needs molly ro tule out IE : would defer to cards: Cotn antbitioc  CABG: cont current meds  ESRD: on HD   DM: monitor and control  HTN: in shock: now on vasopressors:   A fibrillation: off ac:   GI Bleed: per gi :  DW  staff  pt is critically ill now: dw cards    7/15:    left sided pleural effusion:: transudative pl effusion with no with: certainly not the source for MRSA in blood:   Ac hypercarbic resp fialure: now intubated s/p acls protocol last night -s/p cardiac arrest: ROSC 10 mts:   CAD: cont per cards: still on vasopressors: wean as tolerated ? needs molly ro tule out IE : would defer to cards: Cotn antbitioc  CABG: cont current meds  ESRD: on HD   DM: monitor and control  HTN: in shock: now on vasopressors:   A fibrillation: on ac:   GI Bleed: per gi :  DW  staff  pt is critically ill now:     7/16"  left sided pleural effusion:: transudative pl effusion with no with: certainly not the source for MRSA in blood: ?source for MRSA:   Ac hypercarbic resp fialure: now intubated s/p acls protocol last night -s/p cardiac arrest: ROSC 10 mts: he seems to respond off sedation:per MICU note:   CAD: cont per cards: still on vasopressors: wean as tolerated ? needs molly ro tule out IE : would defer to cards: Cotn antbitioc  CABG: cont current meds  ESRD: on HD   DM: monitor and control  HTN: in shock: now on vasopressors:   A fibrillation: on ac:   GI Bleed: per gi :  DW  staff: PMD  pt is critically ill now:      7/17:    left sided pleural effusion:: transudative pl effusion with no with: certainly not the source for MRSA in blood: ?source for MRSA: for molly on monday  Ac hypercarbic resp fialure: now intubated s/p acls protocol last night -s/p cardiac arrest: ROSC 10 mts:   CAD: cont per cards: still on vasopressors: wean as tolerated ? needs molly ro tule out IE : would defer to cards: Cont antibiotics:   CABG: cont current meds  ESRD: on HD   DM: monitor and control  HTN: in shock: now on vasopressors:   A fibrillation: on ac:   GI Bleed: per gi :  DW  staff: PMD  pt is critically ill now:    7/18:    left sided pleural effusion:: transudative pl effusion with no with: certainly not the source for MRSA in blood: ?source for MRSA: for molly on monday: he is off sedation   Ac hypercarbic resp fialure: now intubated s/p acls protocol last night -s/p cardiac arrest: ROSC 10 mts:   Acute cva: noted on ct scan head: neurology following: already on heparin   CAD: cont per cards: still on vasopressors: wean as tolerated ? needs molly ro tule out IE : would defer to cards: Cont antibiotics:   CABG: cont current meds  ESRD: on HD   DM: monitor and control  HTN: in shock: now on vasopressors:   A fibrillation: on ac:   GI Bleed: per gi :  pt is critically ill now:    7/19:      left sided pleural effusion:: transudative pl effusion with no with: certainly not the source for MRSA in blood: ?source for MRSA: for molly today: he is off sedation but still on pressors:   Ac hypercarbic resp fialure: now intubated s/p acls protocol last night -s/p cardiac arrest: ROSC 10 mts:   Acute cva: noted on ct scan head: neurology following: already on heparin   CAD: cont per cards: still on vasopressors: wean as tolerated ? needs molly ro tule out IE : would defer to cards: Cont antibiotics: for molly today  CABG: cont current meds  ESRD: on HD   DM: monitor and control  HTN: in shock: now on vasopressors:   A fibrillation: on ac:   GI Bleed: per gi :  pt is critically ill now:    7/20  Acute Hypercarbic Respiratory Failure  -S/p RRT, tx to MICU 7/8, intubated 7/13 s/p cardiac arrest w/ ROSC  -CPAP trials, ventilator management per MICU team  -Pulmonary toileting, suction PRN   L pleural effusion   -Loculated L pleural effusion on CT chest, s/p CT placement by thoracic 7/8  -Transudative effusion, Cx negative   -CT since removed   -Repeat CT chest with small b/l partially loculated effusions, adjacent atelectasis  Bacteremia  -BC 7/9 MRSA+  -ABX per ID  -Repeat BC NGTD   -F/u cardiology recs regarding MOLLY   CAD (s/p CABG) - per cards   ESRD - HD per renal   GI bleed - GI recs noted     7/21  Acute Hypercarbic Respiratory Failure  -S/p RRT, tx to MICU 7/8, intubated 7/13 s/p cardiac arrest w/ ROSC  -CPAP trials, ventilator management per MICU team  -Pulmonary toileting, suction PRN   L pleural effusion   -Loculated L pleural effusion on CT chest, s/p CT placement by thoracic 7/8  -Transudative effusion, Cx negative   -CT since removed   -Repeat CT chest with small b/l partially loculated effusions, adjacent atelectasis  Bacteremia  -BC 7/9 MRSA+  -ABX per ID  -Repeat BC NGTD   -MOLLY now with MV vegetation per cards  -Pending CT lumbar spine   -F/u CT surgery recs  CAD (s/p CABG) - per cards   ESRD - HD per renal   GI bleed - GI recs noted     7/22  Acute Hypercarbic Respiratory Failure  --S/p RRT, tx to MICU 7/8, intubated 7/13 s/p cardiac arrest w/ ROSC-10 mts  --CPAP trials, ventilator management per MICU team  --Pulmonary toileting, suction PRN   L pleural effusion   --Loculated L pleural effusion on CT chest, s/p CT placement by thoracic 7/8  --Transudative effusion, Cx negative   --CT since removed   --Repeat CT chest with small b/l partially loculated effusions, adjacent atelectasis  Bacteremia  --BC 7/9 MRSA+, repeat BC NGTD   --MOLLY now with MV vegetation, not a candidate for surgery per CTS  --CT lumbar spine with no evidence of discitis   --ABX per ID  DVT prophylaxis  --AC held due to drop in H/H  --Concern for R thigh hematoma, vascular recs noted   CAD (s/p CABG) - per cards   ESRD - HD per renal   GI bleed - resolved, GI recs noted   Care per MICU team     7/23  Acute Hypercarbic Respiratory Failure  --S/p RRT, tx to MICU 7/8, intubated 7/13 s/p cardiac arrest w/ ROSC-10 mts  --Tolerating CPAP trials, plan to extubate per RN   --Ventilator management per MICU team  --Pulmonary toileting, suction PRN   L pleural effusion   --Loculated L pleural effusion on CT chest, s/p CT placement by thoracic 7/8  --Transudative effusion, Cx negative   --CT since removed   --Repeat CT chest with small b/l partially loculated effusions, adjacent atelectasis  Bacteremia  --BC 7/9 MRSA+, repeat BC NGTD   --MOLLY with MV vegetation, not a candidate for surgery per CTS  --CT lumbar spine with no evidence of discitis   --ABX per ID  DVT prophylaxis  --AC held due to drop in H/H  --Concern for R thigh hematoma, vascular recs noted   CAD (s/p CABG) - per cards   ESRD - HD per renal   GI bleed - resolved, GI recs noted   Care per MICU team

## 2021-07-23 NOTE — CONSULT NOTE ADULT - ASSESSMENT
71 yo M with PMhx of CAD s/p CABG (course at that time complicated by pleural effusion requiring chest tubes), ESRD on HD (MWF), T2DM, HTN, Afib on coumadin, and anemia transferred from [facility] for evaluation of chest pain after HD, suspected GIB s/p 2U PRBC transfusion and Afib with RVR.   Admitted to MICU due to sepsis requiting pressor support. S/p cardiac arrest on 7/13 ROSC achieved after approx. 10 minutes.   Palliative care team consulted due to complex medical care and goals of care discussion, requested by patient's.

## 2021-07-23 NOTE — PROGRESS NOTE ADULT - SUBJECTIVE AND OBJECTIVE BOX
EP ATTENDING - DOS 7/23/21    tele: NSR, PVCs, no events    intubated but awake, ROS unable to obtain, appears comfortable        Review of Systems:   Constitutional: [ ] fevers, [ ] chills.   Skin: [ ] dry skin. [ ] rashes.  Psychiatric: [ ] depression, [ ] anxiety.   Gastrointestinal: [ ] BRBPR, [ ] melena.   Neurological: [ ] confusion. [ ] seizures. [ ] shuffling gait.   Ears,Nose,Mouth and Throat: [ ] ear pain [ ] sore throat.   Eyes: [ ] diplopia.   Respiratory: [ ] hemoptysis. [ ] shortness of breath  Cardiovascular: See HPI above  Hematologic/Lymphatic: [ ] anemia. [ ] painful nodes. [ ] prolonged bleeding.   Genitourinary: [ ] hematuria. [ ] flank pain.   Endocrine: [ ] significant change in weight. [ ] intolerance to heat and cold.     Review of systems [ ] otherwise negative, [x ] otherwise unable to obtain    FH: no family history of sudden cardiac death in first degree relatives    SH: [ ] tobacco, [ ] alcohol, [ ] drugs    acetaminophen   Tablet .. 1000 milliGRAM(s) Oral every 6 hours PRN  atorvastatin 40 milliGRAM(s) Oral at bedtime  calamine/zinc oxide Lotion 1 Application(s) Topical three times a day PRN  chlorhexidine 0.12% Liquid 15 milliLiter(s) Oral Mucosa every 12 hours  chlorhexidine 2% Cloths 1 Application(s) Topical daily  dextrose 40% Gel 15 Gram(s) Oral once  dextrose 40% Gel 15 Gram(s) Oral once  dextrose 5%. 1000 milliLiter(s) IV Continuous <Continuous>  dextrose 5%. 1000 milliLiter(s) IV Continuous <Continuous>  dextrose 50% Injectable 25 Gram(s) IV Push once  dextrose 50% Injectable 12.5 Gram(s) IV Push once  dextrose 50% Injectable 25 Gram(s) IV Push once  diltiazem    Tablet 30 milliGRAM(s) Oral four times a day  epoetin danilo-epbx (RETACRIT) Injectable 13022 Unit(s) IV Push <User Schedule>  glucagon  Injectable 1 milliGRAM(s) IntraMuscular once  insulin lispro (ADMELOG) corrective regimen sliding scale   SubCutaneous every 6 hours  midodrine 10 milliGRAM(s) Oral every 8 hours  norepinephrine Infusion 0.02 MICROgram(s)/kG/Min IV Continuous <Continuous>  pantoprazole  Injectable 40 milliGRAM(s) IV Push daily                            7.9    22.20 )-----------( 398      ( 23 Jul 2021 01:31 )             26.0       07-23    134<L>  |  93<L>  |  27<H>  ----------------------------<  127<H>  4.2   |  22  |  4.11<H>    Ca    8.7      23 Jul 2021 01:31  Phos  4.5     07-23  Mg     2.00     07-23    TPro  5.8<L>  /  Alb  2.6<L>  /  TBili  1.1  /  DBili  x   /  AST  42<H>  /  ALT  9   /  AlkPhos  141<H>  07-23      CARDIAC MARKERS ( 23 Jul 2021 01:31 )  x     / x     / 599 U/L / x     / x      CARDIAC MARKERS ( 22 Jul 2021 02:39 )  x     / x     / 610 U/L / x     / x      CARDIAC MARKERS ( 21 Jul 2021 18:08 )  x     / x     / 633 U/L / x     / x            T(C): 37.1 (07-23-21 @ 08:00), Max: 37.9 (07-22-21 @ 17:05)  HR: 93 (07-23-21 @ 10:47) (59 - 93)  BP: 121/48 (07-23-21 @ 10:00) (84/68 - 157/51)  RR: 31 (07-23-21 @ 10:00) (9 - 31)  SpO2: 95% (07-23-21 @ 10:47) (95% - 100%)  Wt(kg): --    I&O's Summary    22 Jul 2021 07:01  -  23 Jul 2021 07:00  --------------------------------------------------------  IN: 1004.9 mL / OUT: 2500 mL / NET: -1495.1 mL    23 Jul 2021 07:01  -  23 Jul 2021 11:36  --------------------------------------------------------  IN: 28.2 mL / OUT: 0 mL / NET: 28.2 mL        General: Well nourished in no acute distress. Alert and Oriented * 3.   Head: Normocephalic and atraumatic.   Neck: No JVD. No bruits. Supple. Does not appear to be enlarged.   Cardiovascular: + S1,S2 ; RRR Soft systolic murmur at the left lower sternal border. No rubs noted.    Lungs: CTA b/l. No rhonchi, rales or wheezes.   Abdomen: + BS, soft. Non tender. Non distended. No rebound. No guarding.   Extremities: No clubbing/cyanosis/edema.   Neurologic: Moves all four extremities. Full range of motion.   Skin: Warm and moist. The patient's skin has normal elasticity and good skin turgor.         A/P) 69 y/o male PMH CAD s/p CABG, PAF/AFL on coumadin, HTN, hyperlipidemia, DM, ESRD on HD, hypothyroidism a/w sepsis from mitral valve endocarditis. Had a PEA arrest while in the hospital.     -heparin infusion for secondary stroke prevention for lifelong a/c  -s/p PEA/jay/asystole arrest, secondary to critical illness and infection.  no pacemaker or ICD indicated.  -no further inpatient EP workup needed but will follow

## 2021-07-23 NOTE — CONSULT NOTE ADULT - CONSULT REQUESTED DATE/TIME
08-Jul-2021 14:11
13-Jul-2021 18:00
07-Jul-2021
07-Jul-2021 17:24
04-Jul-2021 10:38
04-Jul-2021 14:34
05-Jul-2021 13:29
07-Jul-2021 11:22
08-Jul-2021 18:34
21-Jul-2021 16:50
23-Jul-2021 16:35
07-Jul-2021 22:19

## 2021-07-23 NOTE — CONSULT NOTE ADULT - NSICDXPILOT_GEN_ALL_CORE
[place]

## 2021-07-23 NOTE — CONSULT NOTE ADULT - PROBLEM SELECTOR RECOMMENDATION 3
-s/p cardiac arrest on 7/13 w/ ROSC-10 mts  -TRUMAN 7/9 + endocarditis and PFO. +MRSA  -A.fib held AC concerns of R thigh hematoma and GI bleed

## 2021-07-23 NOTE — CONSULT NOTE ADULT - PROBLEM SELECTOR RECOMMENDATION 4
-Spoke with patient son Randal Hardin over the phone in multiple attempts, stated was busy. He would like to have and extensive meeting with palliative team but is not able to have it today.     -As per record, patient's son meet with palliative team on 7/2020: Son stated his mother was assigned as his father HCP however, given his mother has Parkinson's dementia he is the designated surrogate.  In that admission a family discussion was held and as per family's wishes patient was DNR/DNI.    --Will meet with Mrs Tee on 7/26 at 11.00 am to discuss goals of care.

## 2021-07-23 NOTE — PROGRESS NOTE ADULT - ASSESSMENT
71 yo M with PMhx of CAD s/p CABG (course at that time complicated by pleural effusion requiring chest tubes), ESRD on HD (TTS), DMII, HTN, afib on coumadin, and anemia presented from [facility] for evaluation of chest pain and GIB. Renal following for ESRD Mx. s/p cardiac arrest. CTH showed acute infarcts. Further w/u, MRSA bacteremia, TRUMAN showed MR vegetation.     ESRD  Maintenance schedule TTS  K acceptable  has p edema  s/p HD yesterday, Rx sheet reviewed, net UF 2.1kg, tolerated well. uneventful.  Repeat HD tomorrow, will attempt to remove 2.5kg UF, as BP tolerates.   Improved hemodynamics, pressor titration as per ICU team. on Levophed wean as tolerated.   On diltiazem for afib rate control.   On vanc, dosed by levels, for MRSA bacteremia and endocarditis.   dose all meds for ESRD    Anemia in CKD+GIB- Hb <goal  c/w Epo 20k tiw with HD  transfused prbc     d/w ICU team, HD RN  labs, chart reviewed  pl call for any q's   cell [phone]

## 2021-07-23 NOTE — CONSULT NOTE ADULT - SUBJECTIVE AND OBJECTIVE BOX
HPI:  69 yo M with PMhx of CAD s/p CABG (course at that time complicated by pleural effusion requiring chest tubes), ESRD on HD (MWF), DMII, HTN, afib on coumadin, and anemia presented from [facility] for evaluation of chest pain and GIB. Patient is AAOX2 and was able to provide some information. Most of the information was obtained from charting. Patient was admitted to [facility] after developing chest pain during HD. He was found to have Hgb of 6 and elevated trops concerning for GIB and NSTEM II. He was give 2 units of pRBCs and monitored off warfarin/aspirin. He underwent bowel prep but it did not show any bloody stool. As a result, no colonoscopy was performed. His course was complicated by aflutter/afib with RVR. He was managed with metoprolol and never required cardioversion. On day of transfer, his Hgb was in the 8s. Patient was deemed stable enough to be restarted on aspirin and warfarin. Patient was transferred to [facility] for further evaluation. Currently, patient have no symptoms but reports to have intermittent chest pain, that is suprasternal, non-radiating, exertional at times, and non-reproducible on palpation. it improves with rest and worsens with movement. He denies any fever, chills, cough, orthopnea, PND, LE edema, abdominal pain, diarrhea, or dysuria.    On the floor, vitals are WNL.  (03 Jul 2021 21:11)    PERTINENT PM/SXH:   HTN (Hypertension)    DM (Diabetes Mellitus)    CAD (Coronary Artery Disease)    Hypercholesterolemia    Coronary Stent    Heart Attack    H/O: CVA    CKD (chronic kidney disease)    Hyperthyroidism    Stented coronary artery    ESRD (end stage renal disease) on dialysis      Boil of Buttock    S/P cataract extraction    Hemodialysis access, AV graft    S/P CABG x 4      FAMILY HISTORY:  Family history of diabetes mellitus (Sibling)    Family history of coronary artery disease      ITEMS NOT CHECKED ARE NOT PRESENT    SOCIAL HISTORY:   Significant other/partner[ ]  Children[ ]  Catholic/Spirituality:  Substance hx:  [ ]   Tobacco hx:  [ ]   Alcohol hx: [ ]   Home Opioid hx:  [ ] I-Stop Reference No:  Living Situation: [ ]Home  [ ]Long term care  [ ]Rehab [ ]Other    ADVANCE DIRECTIVES:    DNR  MOLST  [ ]  Living Will  [ ]   DECISION MAKER(s):  [ ] Health Care Proxy(s)  [ ] Surrogate(s)  [ ] Guardian           Name(s): Phone Number(s):    BASELINE (I)ADL(s) (prior to admission):  [place]: [ ]Total  [ ] Moderate [ ]Dependent    Allergies    lisinopril (Other)  opioid-like analgesics (Other)    Intolerances    MEDICATIONS  (STANDING):  atorvastatin 40 milliGRAM(s) Oral at bedtime  chlorhexidine 2% Cloths 1 Application(s) Topical daily  dextrose 40% Gel 15 Gram(s) Oral once  dextrose 40% Gel 15 Gram(s) Oral once  dextrose 5%. 1000 milliLiter(s) (50 mL/Hr) IV Continuous <Continuous>  dextrose 5%. 1000 milliLiter(s) (100 mL/Hr) IV Continuous <Continuous>  dextrose 50% Injectable 25 Gram(s) IV Push once  dextrose 50% Injectable 12.5 Gram(s) IV Push once  dextrose 50% Injectable 25 Gram(s) IV Push once  diltiazem    Tablet 30 milliGRAM(s) Oral four times a day  epoetin danilo-epbx (RETACRIT) Injectable 77387 Unit(s) IV Push <User Schedule>  glucagon  Injectable 1 milliGRAM(s) IntraMuscular once  insulin lispro (ADMELOG) corrective regimen sliding scale   SubCutaneous every 6 hours  midodrine 10 milliGRAM(s) Oral every 8 hours  norepinephrine Infusion 0.05 MICROgram(s)/kG/Min (8.08 mL/Hr) IV Continuous <Continuous>  pantoprazole  Injectable 40 milliGRAM(s) IV Push daily    MEDICATIONS  (PRN):  acetaminophen   Tablet .. 1000 milliGRAM(s) Oral every 6 hours PRN Temp greater or equal to 38C (100.4F), Mild Pain (1 - 3), Moderate Pain (4 - 6)  calamine/zinc oxide Lotion 1 Application(s) Topical three times a day PRN Itching    PRESENT SYMPTOMS: [ x]Unable to obtain due to poor mentation   Source if other than patient:  [ ]Family   [ ]Team     Pain: [ ]yes [ ]no  QOL impact -   Location -                    Aggravating factors -  Quality -  Radiation -  Timing-  Severity (0-10 scale):  Minimal acceptable level (0-10 scale):     PAIN AD Score:     [email or web address].[email or web address]/cog/painad.pdf (press ctrl +  left click to view)    Dyspnea:                           [ ]Mild [ ]Moderate [ ]Severe  Anxiety:                             [ ]Mild [ ]Moderate [ ]Severe  Fatigue:                             [ ]Mild [ ]Moderate [ ]Severe  Nausea:                             [ ]Mild [ ]Moderate [ ]Severe  Loss of appetite:              [ ]Mild [ ]Moderate [ ]Severe  Constipation:                    [ ]Mild [ ]Moderate [ ]Severe    Other Symptoms:  [ ]All other review of systems negative     Palliative Performance Status Version 2:         %    [email or web address]/files/[email or web address]_performance_[email or web address]_[email or web address].pdf  PHYSICAL EXAM:  Vital Signs Last 24 Hrs  T(C): 37.2 (23 Jul 2021 12:45), Max: 37.9 (22 Jul 2021 17:05)  T(F): 98.9 (23 Jul 2021 12:45), Max: 100.2 (22 Jul 2021 17:05)  HR: 102 (23 Jul 2021 16:00) (75 - 104)  BP: 90/35 (23 Jul 2021 16:00) (84/68 - 234/209)  BP(mean): 48 (23 Jul 2021 16:00) (48 - 215)  RR: 33 (23 Jul 2021 16:00) (10 - 33)  SpO2: 96% (23 Jul 2021 16:00) (95% - 100%) I&O's Summary    22 Jul 2021 07:01  -  23 Jul 2021 07:00  --------------------------------------------------------  IN: 1004.9 mL / OUT: 2500 mL / NET: -1495.1 mL    23 Jul 2021 07:01  -  23 Jul 2021 16:35  --------------------------------------------------------  IN: 60.3 mL / OUT: 0 mL / NET: 60.3 mL      GENERAL:  [x ]Alert  [ ]Oriented x   [ ]Lethargic  [ ]Cachexia  [ ]Unarousable  [ ]Verbal  [ ]Non-Verbal  Patient is awake  Behavioral:   [ ] Anxiety  [ ] Delirium [ ] Agitation [ ] Other  HEENT:  [ ]Normal   [ ]Dry mouth   [ ]ET Tube/Trach  [ ]Oral lesions  PULMONARY:   [ ]Clear [ ]Tachypnea  [ ]Audible excessive secretions   [ ]Rhonchi        [ ]Right [ ]Left [ ]Bilateral  [ ]Crackles        [ ]Right [ ]Left [ ]Bilateral  [ ]Wheezing     [ ]Right [ ]Left [ ]Bilatera  [ ]Diminished breath sounds [ ]right [ ]left [ ]bilateral  CARDIOVASCULAR:    [ ]Regular  x]Irregular [ ]Tachy  [ ]Gm [ ]Murmur [ ]Other  GASTROINTESTINAL:  [ x]Soft  [x ]Distended   [x ]+BS  [ ]Non tender [ ]Tender  [ ]PEG [x ]OGT/ NGT  Last BM:   GENITOURINARY:  [ ]Normal [ ] Incontinent   [ ]Oliguria/Anuria   [x ]Madrigal scrotal edema  MUSCULOSKELETAL:   [ ]Normal   [ ]Weakness  [ ]Bed/Wheelchair bound [x ]Edema  LUE AV fistula present  NEUROLOGIC:   [ ]No focal deficits  [ ]Cognitive impairment  [ ]Dysphagia [ ]Dysarthria [ ]Paresis [ ]Other   SKIN:   [ ]Normal    [ ]Rash  [ ]Pressure ulcer(s)       Present on admission [ ]y [ ]n    CRITICAL CARE:  [ ] Shock Present  [ ]Septic [ ]Cardiogenic [ ]Neurologic [ ]Hypovolemic  [ ]  Vasopressors [ ]  Inotropes   [ ]Respiratory failure present [ ]Mechanical ventilation [ ]Non-invasive ventilatory support [ ]High flow  [ ]Acute  [ ]Chronic [ ]Hypoxic  [ ]Hypercarbic [ ]Other  [ ]Other organ failure     LABS:                        7.9    22.20 )-----------( 398      ( 23 Jul 2021 01:31 )             26.0   07-23    134<L>  |  93<L>  |  27<H>  ----------------------------<  127<H>  4.2   |  22  |  4.11<H>    Ca    8.7      23 Jul 2021 01:31  Phos  4.5     07-23  Mg     2.00     07-23    TPro  5.8<L>  /  Alb  2.6<L>  /  TBili  1.1  /  DBili  x   /  AST  42<H>  /  ALT  9   /  AlkPhos  141<H>  07-23  PTT - ( 22 Jul 2021 02:39 )  PTT:35.6 sec      RADIOLOGY & ADDITIONAL STUDIES:    PROTEIN CALORIE MALNUTRITION PRESENT: [ ]mild [ ]moderate [ ]severe [ ]underweight [ ]morbid obesity  https://www.andeal.org/vault/2440/web/files/ONC/Table_Clinical%20Characteristics%20to%20Document%20Malnutrition-White%20JV%20et%20al%202012.pdf    Height (cm): 167.6 (07-03-21 @ 19:45)  Weight (kg): 86.2 (07-03-21 @ 19:45)  BMI (kg/m2): 30.7 (07-03-21 @ 19:45)    [ ]PPSV2 < or = to 30% [ ]significant weight loss  [ ]poor nutritional intake  [ ]anasarca      [ ]Artificial Nutrition      REFERRALS:   [ ]Chaplaincy  [ ]Hospice  [ ]Child Life  [ ]Social Work  [ ]Case management [ ]Holistic Therapy     Goals of Care Document:  HPI:  71 yo M with PMhx of CAD s/p CABG (course at that time complicated by pleural effusion requiring chest tubes), ESRD on HD (MWF), DMII, HTN, afib on coumadin, and anemia presented from [facility] for evaluation of chest pain and GIB. Patient is AAOX2 and was able to provide some information. Most of the information was obtained from charting. Patient was admitted to [facility] after developing chest pain during HD. He was found to have Hgb of 6 and elevated trops concerning for GIB and NSTEM II. He was give 2 units of pRBCs and monitored off warfarin/aspirin. He underwent bowel prep but it did not show any bloody stool. As a result, no colonoscopy was performed. His course was complicated by aflutter/afib with RVR. He was managed with metoprolol and never required cardioversion. On day of transfer, his Hgb was in the 8s. Patient was deemed stable enough to be restarted on aspirin and warfarin. Patient was transferred to [facility] for further evaluation. Currently, patient have no symptoms but reports to have intermittent chest pain, that is suprasternal, non-radiating, exertional at times, and non-reproducible on palpation. it improves with rest and worsens with movement. He denies any fever, chills, cough, orthopnea, PND, LE edema, abdominal pain, diarrhea, or dysuria.    On the floor, vitals are WNL.  (03 Jul 2021 21:11)    PERTINENT PM/SXH:   HTN (Hypertension)    DM (Diabetes Mellitus)    CAD (Coronary Artery Disease)    Hypercholesterolemia    Coronary Stent    Heart Attack    H/O: CVA    CKD (chronic kidney disease)    Hyperthyroidism    Stented coronary artery    ESRD (end stage renal disease) on dialysis      Boil of Buttock    S/P cataract extraction    Hemodialysis access, AV graft    S/P CABG x 4      FAMILY HISTORY:  Family history of diabetes mellitus (Sibling)    Family history of coronary artery disease      ITEMS NOT CHECKED ARE NOT PRESENT    SOCIAL HISTORY:   Significant other/partner[ x]  Children[ x]  Yazidism/Spirituality:  Substance hx:  [ ]   Tobacco hx:  [ ]   Alcohol hx: [ ]   Home Opioid hx:  [ ] I-Stop Reference No:  Living Situation: [x ]Home  [ ]Long term care  [ ]Rehab [ ]Other    ADVANCE DIRECTIVES:    DNR  MOLST  [ ]  Living Will  [ ]   DECISION MAKER(s):  [ ] Health Care Proxy(s)  [ ] Surrogate(s)  [ ] Guardian           Name(s): Phone Number(s): Randal    BASELINE (I)ADL(s) (prior to admission):  [place]: [ ]Total  [x ] Moderate [ ]Dependent    Allergies    lisinopril (Other)  opioid-like analgesics (Other)    Intolerances    MEDICATIONS  (STANDING):  atorvastatin 40 milliGRAM(s) Oral at bedtime  chlorhexidine 2% Cloths 1 Application(s) Topical daily  dextrose 40% Gel 15 Gram(s) Oral once  dextrose 40% Gel 15 Gram(s) Oral once  dextrose 5%. 1000 milliLiter(s) (50 mL/Hr) IV Continuous <Continuous>  dextrose 5%. 1000 milliLiter(s) (100 mL/Hr) IV Continuous <Continuous>  dextrose 50% Injectable 25 Gram(s) IV Push once  dextrose 50% Injectable 12.5 Gram(s) IV Push once  dextrose 50% Injectable 25 Gram(s) IV Push once  diltiazem    Tablet 30 milliGRAM(s) Oral four times a day  epoetin danilo-epbx (RETACRIT) Injectable 57991 Unit(s) IV Push <User Schedule>  glucagon  Injectable 1 milliGRAM(s) IntraMuscular once  insulin lispro (ADMELOG) corrective regimen sliding scale   SubCutaneous every 6 hours  midodrine 10 milliGRAM(s) Oral every 8 hours  norepinephrine Infusion 0.05 MICROgram(s)/kG/Min (8.08 mL/Hr) IV Continuous <Continuous>  pantoprazole  Injectable 40 milliGRAM(s) IV Push daily    MEDICATIONS  (PRN):  acetaminophen   Tablet .. 1000 milliGRAM(s) Oral every 6 hours PRN Temp greater or equal to 38C (100.4F), Mild Pain (1 - 3), Moderate Pain (4 - 6)  calamine/zinc oxide Lotion 1 Application(s) Topical three times a day PRN Itching    PRESENT SYMPTOMS: [ x]Unable to obtain due to poor mentation   Source if other than patient:  [ ]Family   [ ]Team     Pain: [ ]yes [ ]no  QOL impact -   Location -                    Aggravating factors -  Quality -  Radiation -  Timing-  Severity (0-10 scale):  Minimal acceptable level (0-10 scale):     PAIN AD Score:     http://geriatrictoolkit.[place].[facility]/cog/painad.pdf (press ctrl +  left click to view)    Dyspnea:                           [ ]Mild [ ]Moderate [ ]Severe  Anxiety:                             [ ]Mild [ ]Moderate [ ]Severe  Fatigue:                             [ ]Mild [ ]Moderate [ ]Severe  Nausea:                             [ ]Mild [ ]Moderate [ ]Severe  Loss of appetite:              [ ]Mild [ ]Moderate [ ]Severe  Constipation:                    [ ]Mild [ ]Moderate [ ]Severe    Other Symptoms:  [ ]All other review of systems negative     Palliative Performance Status Version 2:   10      %    [email or web address]/[email or web address]_performance_scale_[email or web address].pdf  PHYSICAL EXAM:  Vital Signs Last 24 Hrs  T(C): 37.2 (23 Jul 2021 12:45), Max: 37.9 (22 Jul 2021 17:05)  T(F): 98.9 (23 Jul 2021 12:45), Max: 100.2 (22 Jul 2021 17:05)  HR: 102 (23 Jul 2021 16:00) (75 - 104)  BP: 90/35 (23 Jul 2021 16:00) (84/68 - 234/209)  BP(mean): 48 (23 Jul 2021 16:00) (48 - 215)  RR: 33 (23 Jul 2021 16:00) (10 - 33)  SpO2: 96% (23 Jul 2021 16:00) (95% - 100%) I&O's Summary    22 Jul 2021 07:01  -  23 Jul 2021 07:00  --------------------------------------------------------  IN: 1004.9 mL / OUT: 2500 mL / NET: -1495.1 mL    23 Jul 2021 07:01  -  23 Jul 2021 16:35  --------------------------------------------------------  IN: 60.3 mL / OUT: 0 mL / NET: 60.3 mL      GENERAL:  [x ]Alert  [ ]Oriented x   [ ]Lethargic  [ ]Cachexia  [ ]Unarousable  [ ]Verbal  [ ]Non-Verbal  Patient is awake  Behavioral:   [ ] Anxiety  [ ] Delirium [ ] Agitation [ ] Other  HEENT:  [ ]Normal   [ ]Dry mouth   [x ]ET Tube/Trach  [ ]Oral lesions  PULMONARY:   [ ]Clear [ ]Tachypnea  [ ]Audible excessive secretions   [ ]Rhonchi        [ ]Right [ ]Left [ ]Bilateral  [ ]Crackles        [ ]Right [ ]Left [ ]Bilateral  [ ]Wheezing     [ ]Right [ ]Left [ ]Bilatera  [ ]Diminished breath sounds [ ]right [ ]left [ ]bilateral  CARDIOVASCULAR:    [ ]Regular  x]Irregular [ ]Tachy  [ ]Gm [ ]Murmur [ ]Other  GASTROINTESTINAL:  [ x]Soft  [x ]Distended   [x ]+BS  [ ]Non tender [ ]Tender  [ ]PEG [x ]OGT/ NGT  Last BM:   GENITOURINARY:  [ ]Normal [ ] Incontinent   [x ]Oliguria/Anuria   [x ]Madrigal scrotal edema  MUSCULOSKELETAL:   [ ]Normal   [ ]Weakness  [ x]Bed/Wheelchair bound [x ]Edema  LUE AV fistula present  NEUROLOGIC:   [ ]No focal deficits  [x ]Cognitive impairment  [ ]Dysphagia [ ]Dysarthria [ ]Paresis [ ]Other   SKIN:   [x ]Normal    [ ]Rash  [ ]Pressure ulcer(s)       Present on admission [ ]y [ ]n    CRITICAL CARE:  [ ] Shock Present  [ ]Septic [ ]Cardiogenic [ ]Neurologic [ ]Hypovolemic  [ ]  Vasopressors [ ]  Inotropes   [ ]Respiratory failure present [ ]Mechanical ventilation [ ]Non-invasive ventilatory support [ ]High flow  [ ]Acute  [ ]Chronic [ ]Hypoxic  [ ]Hypercarbic [ ]Other  [ ]Other organ failure     LABS:                        7.9    22.20 )-----------( 398      ( 23 Jul 2021 01:31 )             26.0   07-23    134<L>  |  93<L>  |  27<H>  ----------------------------<  127<H>  4.2   |  22  |  4.11<H>    Ca    8.7      23 Jul 2021 01:31  Phos  4.5     07-23  Mg     2.00     07-23    TPro  5.8<L>  /  Alb  2.6<L>  /  TBili  1.1  /  DBili  x   /  AST  42<H>  /  ALT  9   /  AlkPhos  141<H>  07-23  PTT - ( 22 Jul 2021 02:39 )  PTT:35.6 sec      RADIOLOGY & ADDITIONAL STUDIES:    PROTEIN CALORIE MALNUTRITION PRESENT: [ ]mild [ ]moderate [ ]severe [ ]underweight [ ]morbid obesity  https://www.andeal.org/vault/2440/web/files/ONC/Table_Clinical%20Characteristics%20to%20Document%20Malnutrition-White%20JV%20et%20al%202012.pdf    Height (cm): 167.6 (07-03-21 @ 19:45)  Weight (kg): 86.2 (07-03-21 @ 19:45)  BMI (kg/m2): 30.7 (07-03-21 @ 19:45)    [ ]PPSV2 < or = to 30% [ ]significant weight loss  [ ]poor nutritional intake  [ ]anasarca      [ ]Artificial Nutrition      REFERRALS:   [ ]Chaplaincy  [ ]Hospice  [ ]Child Life  [ ]Social Work  [ ]Case management [ ]Holistic Therapy     Goals of Care Document:

## 2021-07-23 NOTE — PROGRESS NOTE ADULT - ASSESSMENT
70 year old male with Anemia    Anemia  CT w/thigh hematoma likely source of anemia; management per vascular   Monitor CBC and Keep hemoglobin > 8 given cardiac issues   negative occult x 2; without overt gi bleeding  Continue PPI   tube feeds per micu    Ascites  2/2 RHF w/dilated IVC on CT   diuresis per cardiology     Endocarditis  TRUMAN with Large Vegetation  management per ID/MICU/CTSx recs     CVA, Embolic   care per neurology, cardiology and micu appreciated     Cardiac Arrest  Care per cardiology and MICU appreciated     Advanced care planning was discussed with family.  Advanced care planning forms were reviewed and discussed.  Risks, benefits and alternatives of gastroenterologic procedures were discussed in detail and all questions were answered.  30 minutes spent.

## 2021-07-23 NOTE — PROGRESS NOTE ADULT - SUBJECTIVE AND OBJECTIVE BOX
Date of Service: 07-23-21 @ 13:27    Patient is a 70y old  Male who presents with a chief complaint of Chest pain and GIB (23 Jul 2021 12:21)    Any change in ROS:   Tolerating CPAP trials  Plan for extubation per RN  Denies CP, SOB    MEDICATIONS  (STANDING):  atorvastatin 40 milliGRAM(s) Oral at bedtime  chlorhexidine 2% Cloths 1 Application(s) Topical daily  dextrose 40% Gel 15 Gram(s) Oral once  dextrose 40% Gel 15 Gram(s) Oral once  dextrose 5%. 1000 milliLiter(s) (50 mL/Hr) IV Continuous <Continuous>  dextrose 5%. 1000 milliLiter(s) (100 mL/Hr) IV Continuous <Continuous>  dextrose 50% Injectable 25 Gram(s) IV Push once  dextrose 50% Injectable 12.5 Gram(s) IV Push once  dextrose 50% Injectable 25 Gram(s) IV Push once  diltiazem    Tablet 30 milliGRAM(s) Oral four times a day  epoetin danilo-epbx (RETACRIT) Injectable 84225 Unit(s) IV Push <User Schedule>  glucagon  Injectable 1 milliGRAM(s) IntraMuscular once  insulin lispro (ADMELOG) corrective regimen sliding scale   SubCutaneous every 6 hours  midodrine 10 milliGRAM(s) Oral every 8 hours  norepinephrine Infusion 0.02 MICROgram(s)/kG/Min (1.62 mL/Hr) IV Continuous <Continuous>  pantoprazole  Injectable 40 milliGRAM(s) IV Push daily    MEDICATIONS  (PRN):  acetaminophen   Tablet .. 1000 milliGRAM(s) Oral every 6 hours PRN Temp greater or equal to 38C (100.4F), Mild Pain (1 - 3), Moderate Pain (4 - 6)  calamine/zinc oxide Lotion 1 Application(s) Topical three times a day PRN Itching    Vital Signs Last 24 Hrs  T(C): 37.1 (23 Jul 2021 08:00), Max: 37.9 (22 Jul 2021 17:05)  T(F): 98.7 (23 Jul 2021 08:00), Max: 100.2 (22 Jul 2021 17:05)  HR: 87 (23 Jul 2021 12:52) (59 - 93)  BP: 107/80 (23 Jul 2021 12:45) (84/68 - 234/209)  BP(mean): 84 (23 Jul 2021 12:45) (52 - 215)  RR: 19 (23 Jul 2021 12:45) (9 - 31)  SpO2: 97% (23 Jul 2021 12:52) (95% - 100%)  Mode: CPAP with PS  FiO2: 30  PEEP: 5  MAP: 9    I&O's Summary    22 Jul 2021 07:01  -  23 Jul 2021 07:00  --------------------------------------------------------  IN: 1004.9 mL / OUT: 2500 mL / NET: -1495.1 mL    23 Jul 2021 07:01  -  23 Jul 2021 13:27  --------------------------------------------------------  IN: 28.2 mL / OUT: 0 mL / NET: 28.2 mL    Physical Exam:   GENERAL: NADd  HEENT: KATHY  ENMT: No tonsillar erythema, exudates, or enlargement  NECK: Supple, No JVD  CHEST/LUNG: Coarse BS b/l  CVS: Regular rate and rhythm  GI: : Soft, Nontender, Nondistended  NERVOUS SYSTEM:  Alert, following commands  EXTREMITIES:  2+ Peripheral Pulses, mild edema  LYMPH: No lymphadenopathy noted  SKIN: No rashes or lesions  PSYCH: Appropriate    Labs:  ABG - ( 23 Jul 2021 01:31 )  pH, Arterial: 7.39  pH, Blood: x     /  pCO2: 44    /  pO2: 43    / HCO3: 25    / Base Excess: 1.7   /  SaO2: 70.7      CARDIAC MARKERS ( 23 Jul 2021 01:31 )  x     / x     / 599 U/L / x     / x      CARDIAC MARKERS ( 22 Jul 2021 02:39 )  x     / x     / 610 U/L / x     / x      CARDIAC MARKERS ( 21 Jul 2021 18:08 )  x     / x     / 633 U/L / x     / x                       7.9    22.20 )-----------( 398      ( 23 Jul 2021 01:31 )             26.0                         7.8    20.86 )-----------( 341      ( 22 Jul 2021 15:31 )             25.6                         7.7    20.00 )-----------( 312      ( 22 Jul 2021 06:52 )             25.2                         6.6    14.45 )-----------( 251      ( 22 Jul 2021 02:39 )             22.1                         7.1    14.58 )-----------( 275      ( 21 Jul 2021 18:08 )             24.0                         7.6    12.54 )-----------( 252      ( 21 Jul 2021 03:27 )             25.1                         6.4    10.46 )-----------( 189      ( 20 Jul 2021 21:23 )             21.6                         7.6    13.29 )-----------( 223      ( 20 Jul 2021 03:11 )             25.9     07-23    134<L>  |  93<L>  |  27<H>  ----------------------------<  127<H>  4.2   |  22  |  4.11<H>  07-22    134<L>  |  94<L>  |  42<H>  ----------------------------<  99  4.6   |  22  |  5.90<H>  07-21    134<L>  |  93<L>  |  32<H>  ----------------------------<  109<H>  4.1   |  23  |  4.75<H>  07-20    133<L>  |  92<L>  |  47<H>  ----------------------------<  129<H>  4.5   |  23  |  6.78<H>    Ca    8.7      23 Jul 2021 01:31  Ca    8.5      22 Jul 2021 02:39  Phos  4.5     07-23  Phos  5.6     07-22  Mg     2.00     07-23  Mg     1.90     07-22    TPro  5.8<L>  /  Alb  2.6<L>  /  TBili  1.1  /  DBili  x   /  AST  42<H>  /  ALT  9   /  AlkPhos  141<H>  07-23  TPro  5.1<L>  /  Alb  2.3<L>  /  TBili  0.8  /  DBili  x   /  AST  33  /  ALT  8   /  AlkPhos  115  07-22  TPro  5.3<L>  /  Alb  2.3<L>  /  TBili  1.3<H>  /  DBili  x   /  AST  38  /  ALT  8   /  AlkPhos  109  07-21  TPro  5.2<L>  /  Alb  2.3<L>  /  TBili  1.0  /  DBili  x   /  AST  26  /  ALT  6   /  AlkPhos  94  07-20    CAPILLARY BLOOD GLUCOSE  POCT Blood Glucose.: 149 mg/dL (23 Jul 2021 11:40)  POCT Blood Glucose.: 163 mg/dL (23 Jul 2021 04:59)  POCT Blood Glucose.: 139 mg/dL (22 Jul 2021 23:18)  POCT Blood Glucose.: 137 mg/dL (22 Jul 2021 18:05)    LIVER FUNCTIONS - ( 23 Jul 2021 01:31 )  Alb: 2.6 g/dL / Pro: 5.8 g/dL / ALK PHOS: 141 U/L / ALT: 9 U/L / AST: 42 U/L / GGT: x           PT/INR - ( 21 Jul 2021 16:27 )   PT: 15.4 sec;   INR: 1.36 ratio    PTT - ( 22 Jul 2021 02:39 )  PTT:35.6 sec    Studies  Chest X-RAY  < from: Xray Chest 1 View- PORTABLE-Urgent (Xray Chest 1 View- PORTABLE-Urgent .) (07.19.21 @ 20:01) >  IMPRESSION:  Enteric tube looped upon itself in the stomach. The tip and side port are in the stomach.    ET tube and left IJ line as above.    Unchanged patchy right upper lung opacity. Improved patchy right lower lung opacity. Worsening left perihilar opacities.    Increased left basilar and retrocardiac opacity which may be due to a left pleural effusion with passive atelectasis, atelectasis of other cause, and/or pneumonia.    < end of copied text >    CT SCAN Chest < from: CT Chest w/ IV Cont (07.13.21 @ 18:36) >  FINDINGS:  CHEST:  LUNGS, LARGE AIRWAYS, AND PLEURA: Minimal secretions within the distal trachea and right main stem bronchus. Endotracheal tube is in place. Small bilateral pleural effusions,,partially loculated, with adjacent passive atelectasis. Vascular congestion and groundglass opacities.  VESSELS: Left IJ approach central venous catheter with the tip in the left brachiocephalic vein. Air is seen within the left brachiocephalic, likely secondary to injections. Left axillary vein stent is patent. Calcifications of the aortic arch and coronary vessels. CABG.  HEART: Cardiomegaly. Left ventricular free wall fatty replacement, likely related to prior myocardial infarction. No pericardial effusion. Retained epicardial pacemaker leads.  MEDIASTINUM AND YOANA: No lymphadenopathy.  CHEST WALL AND LOWER NECK: Enlarged thyroid gland with multiple hypodense nodules bilaterally, the right containing calcifications, appear unchanged. Median sternotomy.  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  BILE DUCTS: Normal caliber.  GALLBLADDER: Within normal limits.  SPLEEN: Within normal limits.  PANCREAS: Within normal limits.  ADRENALS: Within normal limits.  KIDNEYS/URETERS: Atrophic bilateral kidneys. No hydronephrosis. Exophytic 1.3 cm cyst arising from the left kidney.    BLADDER: Decompressed.  REPRODUCTIVE ORGANS: Prostate within normal limits. Bilateral hydroceles.    BOWEL: No bowel obstruction. Appendix is normal. Enteric tube is present with the tip in stomach.  PERITONEUM: Mild to moderate ascites in the perihepatic and perisplenic regions, tracking along the paracolic gutters. Trace ascites in the pelvis.  VESSELS: Atherosclerotic changes.  RETROPERITONEUM/LYMPH NODES: No lymphadenopathy.  ABDOMINAL WALL: Diffuse subcutaneous edema.  BONES: Degenerative changes.    IMPRESSION:  *  Mild pulmonary edema with bilateral partially loculated pleural effusions with adjacent passive atelectasis.  *  Debris within the right mainstem with associated partial atelectasis of the right lower lobe.  *  Mild to moderate ascites.    --- End of Report ---      < end of copied text >    < from: TRUMAN w/o TTE (w/3D Echo) (07.19.21 @ 09:02) >  ------------------------------------------------------------------------  CONCLUSIONS:  1. A large (about  1.3 cm X 0.3 cm),  mobile echodensity is  visualized attached to the atrial aspect of the medial (P3)  scallop of the posterior mitral valve leaflet and is  consistent with a vegetation.  In addition, a portion of  the anterior mitral leaflet is markedly thickened and this  may represent sessile vegetation. Moderate mitral  regurgitation.  Vena contracta width about  0.4 - 0.5 cm.  2. Calcified trileaflet aortic valve with normal opening.  No vegetations seen in association with the aortic valve.  No aortic valve regurgitation seen.  3. Normal aortic root.  Mild non-mobile atherosclerotic  plaque in the aortic arch.  Severe, non-mobile  atherosclerotic plaque in the descending thoracic aorta.  4. Normal left atrium.  No left atrial or left atrial  appendage thrombus.  5. Mild segmental left ventricular systolic dysfunction.  Hypokinesis of the basal to mid inferior wall.  6. Right ventricular enlargement with decreased right  ventricular systolic function.  7. Normal tricuspid valve.  No vegetations seen in  association with the tricuspid valve.  Moderate-severe  tricuspid regurgitation.  8. Agitated saline injection and color flow Doppler  demonstrate evidence of a patent foramen ovale.    < end of copied text >

## 2021-07-23 NOTE — PROGRESS NOTE ADULT - SUBJECTIVE AND OBJECTIVE BOX
Patient seen at the bedside this AM. No events overnight. No new complaints     Vital Signs Last 24 Hrs  T(C): 37.1 (23 Jul 2021 08:00), Max: 37.9 (22 Jul 2021 17:05)  T(F): 98.7 (23 Jul 2021 08:00), Max: 100.2 (22 Jul 2021 17:05)  HR: 91 (23 Jul 2021 11:45) (59 - 93)  BP: 100/53 (23 Jul 2021 11:45) (84/68 - 234/209)  BP(mean): 57 (23 Jul 2021 11:45) (46 - 215)  RR: 25 (23 Jul 2021 11:45) (9 - 31)  SpO2: 98% (23 Jul 2021 11:45) (95% - 100%)    I&O's Summary    22 Jul 2021 07:01  -  23 Jul 2021 07:00  --------------------------------------------------------  IN: 1004.9 mL / OUT: 2500 mL / NET: -1495.1 mL    23 Jul 2021 07:01  -  23 Jul 2021 12:20  --------------------------------------------------------  IN: 28.2 mL / OUT: 0 mL / NET: 28.2 mL        DIET/FLUIDS: dextrose 5%. 1000 milliLiter(s) IV Continuous <Continuous>  dextrose 5%. 1000 milliLiter(s) IV Continuous <Continuous>    GI proph:  pantoprazole  Injectable 40 milliGRAM(s) IV Push every 12 hours    Physical Exam:  Constitutional: NAD, intubated, alert   HEENT: PERRLA, EOMI  Respiratory: Unlabored   Cardiovascular: RRR  Extremities: No peripheral edema. Motor/sensation intact b/l LE   Vascular: 2+Fem/Pop bilaterally. B/l DP/PT signals.       LABS  07-23    134<L>  |  93<L>  |  27<H>  ----------------------------<  127<H>  4.2   |  22  |  4.11<H>    Ca    8.7      23 Jul 2021 01:31  Phos  4.5     07-23  Mg     2.00     07-23    TPro  5.8<L>  /  Alb  2.6<L>  /  TBili  1.1  /  DBili  x   /  AST  42<H>  /  ALT  9   /  AlkPhos  141<H>  07-23                          7.9    22.20 )-----------( 398      ( 23 Jul 2021 01:31 )             26.0

## 2021-07-23 NOTE — PROGRESS NOTE ADULT - ASSESSMENT
71 yo M with complex PMhx of CAD s/p CABG (course at that time complicated by pleural effusion requiring chest tubes), ESRD on HD (MWF), DMII, HTN, afib on coumadin, and anemia presented from [facility] for evaluation of chest pain and GIB. Since admission to [facility] pt without evidence of GIB. Course has been c/b cardiac arrest on 07/13, intubated, left pleural effusion s/p chest tube (now removed) and sepsis with MRSA bacteremia requiring pressor support. Most recent pt found with vegetations on TTE. Vascular surgery consulted for newly noted right thigh hematoma/concern for compartment syndrome. Thigh is soft this morning on exam. Clinically no evidence for compartment syndrome.     - Heparin gtt for afib is held in setting of transfusion requirements, thigh soft this AM on exam, consider alternate source for active bleeding  - Continue to hold AC, trend H&H   - Monitor vitals, pressor requirements  - Monitor for signs of compartment syndrome   - Trend CPK, q1hr neurovascular checks  - No acute surgical intervention, will follow      C Team Surgery   [id number]   71 yo M with complex PMhx of CAD s/p CABG (course at that time complicated by pleural effusion requiring chest tubes), ESRD on HD (MWF), DMII, HTN, afib on coumadin, and anemia presented from [facility] for evaluation of chest pain and GIB. Since admission to [facility] pt without evidence of GIB. Course has been c/b cardiac arrest on 07/13, intubated, left pleural effusion s/p chest tube (now removed) and sepsis with MRSA bacteremia requiring pressor support. Most recent pt found with vegetations on TTE. Vascular surgery consulted for newly noted right thigh hematoma/concern for compartment syndrome. Thigh is soft this morning on exam. Clinically no evidence for compartment syndrome.     Plan:  - no plan for vascular surgery intervention  - no evidence of compartment syndrome or expanding hematoma  - please reconsult as needed    C Team Surgery   [id number]

## 2021-07-23 NOTE — CONSULT NOTE ADULT - CONSULT REASON
CTH findings
Chest Pain
abnormal EKG
chest pain and GIB
loculated left-sided pleural effusion
Hypotension
Right thigh hematoma
ESRD
fever
Anemia
pL EFFUSION
GOC; Patient's son (Randal) specifically requested palliative. Randal is the person who will make the decision regarding his dad's care

## 2021-07-23 NOTE — CONSULT NOTE ADULT - PROBLEM SELECTOR RECOMMENDATION 2
-Intubated 7/13   -Currently on CPAP trials, plan to extubate as per primary team  -Ventilator management per MICU team  L pleural effusion   -Loculated L pleural effusion on CT chest, s/p CT placement by thoracic 7/8

## 2021-07-23 NOTE — CHART NOTE - NSCHARTNOTEFT_GEN_A_CORE
MICU Transfer Note    Transfer from: MICU    Transfer to: ( ) Medicine    ( ) Telemetry     ( ) RCU        ( ) Palliative         ( ) Stroke Unit          ( ) __________________    Accepting physician:      MICU COURSE:  71 yo M with PMhx of CAD s/p CABG (complicated by pleural effusion w chest tubes), ESRD on HD (MWF), DMII, HTN, afib on coumadin, and anemia presented from [facility] for evaluation of chest pain and GIB. Patient was admitted to [facility] after developing chest pain during HD. He was found to have Hgb of 6 and elevated trops concerning for GIB and NSTEM II. He was given 2 units of pRBCs and monitored off warfarin/aspirin. He underwent bowel prep but it did not show any bloody stool; no colonoscopy performed in [facility] as no active signs of bleeding. Patient was transferred to [facility] for further evaluation.    MICU was consulted 7/7 due to hypotension to 80/51. Patient was asymptomatic, denying f/c, n/v, dizziness, HA, CP, SOB at rest, abd pain. Midodrine was recommended. He received 20mg at 20:55 and 10mg at 00:00am. On 7/8, the patient continued to be hypotensive, morning dose of Midodrine was not given due to parameters. Chest tubes were placed for loculated pleural effusion and Midodrine was given. Patient became bradycardic and hypotensive, RRT was called due to multifactorial shock possibly 2/2 sepsis iso recent fluid removal from CT and possibly medication induced bradycardia. Pt persistently hypotensive and bradycardic during rapid. Given x 2 with no response. Given push of phenylephrine and started on levophed. Patient transferred to MICU    In MICU patient's BP was managed on levophed. Patient was doing well requiring less pressor support. BCx from 7/7 grew MRSA and so the patient was start on /11 on vanco by level. His chest tube was removed on 7/12 as he wasn't making any output. O/N on 7/13, patient had cardiac arrest w/ vtach, received shocks, epi, coded 10 minutes prior to achieving ROSC and stabilized. Patient was also intubated at that time. Post-cardiac arrest CT head showed two new infarct in his brain. EKG was suggestive of conduction abnormality/heart block and cardiology decided to do a TRUMAN (TTE was inconclusive). Patient underwent TRUMAN with cardiology and was found to have 1.3 cm X 0.3 cm vegetation on posterior leaflet of the mitral valve with a PFO. Unclear etiology of stroke: septic emboli vs blood clot (Afib). CTS was contacted, however, given the patient's medical history and risk, a surgery was not recommended. Course was complicated by a drop in hgb <7 requiring 1u of blood twice o/n. FOBT was negative and GI did not think it was a GI bleed. Patient started complaining of thigh pain and subsequently a CT of right thigh was done on 7/21, which demonstrated a hematoma on his right adductor muscle with no extravasation. Surgery was consulted, they did think a surgery was indicated and recommended ACE-wrap. Patient hgb has now stabilized. He was tolerating well on CPAP and was extubated on 7/23. He was started on midodrine and his levophed was decreased. His BP is currently stable requiring no pressor support and patient is stable to go to the floors.    Patient is being followed by nephrology for HD. His HD course was deviated given the course of complications in the MICU. Patient is positive 8L for his stay in the hospital, so currently       ASSESSMENT & PLAN:     For Followup:        Vitals  T(F): 99.9 (07-23-21 @ 16:00), Max: 100 (07-23-21 @ 00:00)  HR: 105 (07-23-21 @ 17:00) (75 - 105)  BP: 84/53 (07-23-21 @ 17:00) (84/53 - 234/209)  BP(mean): 58 (07-23-21 @ 17:00) (48 - 215)  ABP: --  ABP(mean): --  RR: 30 (07-23-21 @ 17:00) (10 - 33)  SpO2: 94% (07-23-21 @ 17:00) (94% - 100%)  I/O Summary 24H    IN: 1004.9 mL / OUT: 2500 mL / NET: -1495.1 mL        MEDICATIONS  (STANDING):  atorvastatin 40 milliGRAM(s) Oral at bedtime  chlorhexidine 2% Cloths 1 Application(s) Topical daily  dextrose 40% Gel 15 Gram(s) Oral once  dextrose 40% Gel 15 Gram(s) Oral once  dextrose 5%. 1000 milliLiter(s) (50 mL/Hr) IV Continuous <Continuous>  dextrose 5%. 1000 milliLiter(s) (100 mL/Hr) IV Continuous <Continuous>  dextrose 50% Injectable 25 Gram(s) IV Push once  dextrose 50% Injectable 12.5 Gram(s) IV Push once  dextrose 50% Injectable 25 Gram(s) IV Push once  diltiazem    Tablet 30 milliGRAM(s) Oral four times a day  epoetin adnilo-epbx (RETACRIT) Injectable 57165 Unit(s) IV Push <User Schedule>  glucagon  Injectable 1 milliGRAM(s) IntraMuscular once  insulin lispro (ADMELOG) corrective regimen sliding scale   SubCutaneous every 6 hours  midodrine 10 milliGRAM(s) Oral every 8 hours  norepinephrine Infusion 0.05 MICROgram(s)/kG/Min (8.08 mL/Hr) IV Continuous <Continuous>  pantoprazole  Injectable 40 milliGRAM(s) IV Push daily    MEDICATIONS  (PRN):  acetaminophen   Tablet .. 1000 milliGRAM(s) Oral every 6 hours PRN Temp greater or equal to 38C (100.4F), Mild Pain (1 - 3), Moderate Pain (4 - 6)  calamine/zinc oxide Lotion 1 Application(s) Topical three times a day PRN Itching        LABS  CBC 07-23-21 @ 01:31                        7.9    22.20 )-----------( 398                   26.0       Hgb trend: 7.9 <-- , 7.8 <-- , 7.7 <-- , 6.6 <-- , 7.1 <-- , 7.6 <-- , 6.4 <--   WBC trend: 22.20 <-- , 20.86 <-- , 20.00 <-- , 14.45 <-- , 14.58 <-- , 12.54 <-- , 10.46 <--     CMP 07-23-21 @ 01:31    134<L>  |  93<L>  |  27<H>  ----------------------------<  127<H>  4.2   |  22  |  4.11<H>    Ca    8.7      07-23-21 @ 01:31  Phos  4.5     07-23  Mg     2.00     07-23    TPro  5.8<L>  /  Alb  2.6<L>  /  TBili  1.1  /  DBili  x   /  AST  42<H>  /  ALT  9   /  AlkPhos  141<H>  07-23      Serum Cr trend: 4.11 <-- , 5.90 <-- , 4.75 <--   PTT - ( 22 Jul 2021 02:39 ):35.6 secr MICU Transfer Note    Transfer from: MICU    Transfer to: (x) Medicine    ( ) Telemetry     ( ) RCU        ( ) Palliative         ( ) Stroke Unit          ( ) __________________    Accepting physician:      MICU COURSE:  69 yo M with PMhx of CAD s/p CABG (complicated by pleural effusion w chest tubes), ESRD on HD (MWF), DMII, HTN, afib on coumadin, and anemia presented from [facility] for evaluation of chest pain and GIB. Patient was admitted to [facility] after developing chest pain during HD. He was found to have Hgb of 6 and elevated trops concerning for GIB and NSTEM II. He was given 2 units of pRBCs and monitored off warfarin/aspirin. He underwent bowel prep but it did not show any bloody stool; no colonoscopy performed in [facility] as no active signs of bleeding. Patient was transferred to [facility] for further evaluation.    MICU was consulted 7/7 due to hypotension to 80/51. Patient was asymptomatic, denying f/c, n/v, dizziness, HA, CP, SOB at rest, abd pain. Midodrine was recommended. He received 20mg at 20:55 and 10mg at 00:00am. On 7/8, the patient continued to be hypotensive, morning dose of Midodrine was not given due to parameters. Chest tubes were placed for loculated pleural effusion and Midodrine was given. Patient became bradycardic and hypotensive, RRT was called due to multifactorial shock possibly 2/2 sepsis iso recent fluid removal from CT and possibly medication induced bradycardia. Pt persistently hypotensive and bradycardic during rapid. Given x 2 with no response. Given push of phenylephrine and started on levophed. Patient transferred to MICU    In MICU patient's BP was managed on levophed. Patient was doing well requiring less pressor support. BCx from 7/7 grew MRSA and so the patient was start on 7/11 on vanco by level (will continue with vanc through 8/22). His chest tube was removed on 7/12 as he wasn't making any output. O/N on 7/13, patient had cardiac arrest w/ vtach, received shocks, epi, coded 10 minutes prior to achieving ROSC and stabilized. Patient was also intubated at that time. Post-cardiac arrest CT head showed two new infarct in his brain. EKG was suggestive of conduction abnormality/heart block and cardiology decided to do a TRUMAN (TTE was inconclusive). Patient underwent TRUMAN with cardiology and was found to have 1.3 cm X 0.3 cm vegetation on posterior leaflet of the mitral valve with a PFO. Unclear etiology of stroke: septic emboli vs blood clot (Afib). CTS was contacted, however, given the patient's medical history and risk, a surgery was not recommended. Course was complicated by a drop in hgb <7 requiring 1u of blood twice o/n. FOBT was negative and GI did not think it was a GI bleed. Patient started complaining of thigh pain and subsequently a CT of right thigh was done on 7/21, which demonstrated a hematoma on his right adductor muscle with no extravasation. Surgery was consulted, they did think a surgery was indicated and recommended ACE-wrap. Patient hgb has now stabilized. He was tolerating well on CPAP and was extubated on 7/23. He was started on midodrine and his levophed was decreased. His BP is currently stable  and patient is stable to go to the floors. Additionally, 7/24 BCx were positive for  Serratia and hence patient was started on cefepime.    Patient is being followed by nephrology for HD, getting dialysis 3x/wk (MWF). His HD course was deviated given the course of complications in the MICU. Patient is positive 5L for hospital stay.       ASSESSMENT & PLAN:     For Followup:  -c/w IV cefepime 1g daily for serratia bacteremia  -c/w IV vancomycin MWF dosed per level, following HD session  -c/w midodrine 20mg TID PO  -c/w pantoprazole 40mg BID PO  -c/w diltiazem 30mg QID  -c/w APAP 1000mg q6h PRN for fever or pain  -c/w HD MWF  -c/w RetaCrit [id number] Tues/Thurs/Sat  -c/w atorvastatin 40mg daily PO  -c/w dysphagia diet for comfort  -c/w NC for O2 supplementation        Vitals  T(F): 99.9 (07-23-21 @ 16:00), Max: 100 (07-23-21 @ 00:00)  HR: 105 (07-23-21 @ 17:00) (75 - 105)  BP: 84/53 (07-23-21 @ 17:00) (84/53 - 234/209)  BP(mean): 58 (07-23-21 @ 17:00) (48 - 215)  ABP: --  ABP(mean): --  RR: 30 (07-23-21 @ 17:00) (10 - 33)  SpO2: 94% (07-23-21 @ 17:00) (94% - 100%)  I/O Summary 24H    IN: 1004.9 mL / OUT: 2500 mL / NET: -1495.1 mL        MEDICATIONS  (STANDING):  atorvastatin 40 milliGRAM(s) Oral at bedtime  chlorhexidine 2% Cloths 1 Application(s) Topical daily  dextrose 40% Gel 15 Gram(s) Oral once  dextrose 40% Gel 15 Gram(s) Oral once  dextrose 5%. 1000 milliLiter(s) (50 mL/Hr) IV Continuous <Continuous>  dextrose 5%. 1000 milliLiter(s) (100 mL/Hr) IV Continuous <Continuous>  dextrose 50% Injectable 25 Gram(s) IV Push once  dextrose 50% Injectable 12.5 Gram(s) IV Push once  dextrose 50% Injectable 25 Gram(s) IV Push once  diltiazem    Tablet 30 milliGRAM(s) Oral four times a day  epoetin danilo-epbx (RETACRIT) Injectable 81890 Unit(s) IV Push <User Schedule>  glucagon  Injectable 1 milliGRAM(s) IntraMuscular once  insulin lispro (ADMELOG) corrective regimen sliding scale   SubCutaneous every 6 hours  midodrine 10 milliGRAM(s) Oral every 8 hours  norepinephrine Infusion 0.05 MICROgram(s)/kG/Min (8.08 mL/Hr) IV Continuous <Continuous>  pantoprazole  Injectable 40 milliGRAM(s) IV Push daily    MEDICATIONS  (PRN):  acetaminophen   Tablet .. 1000 milliGRAM(s) Oral every 6 hours PRN Temp greater or equal to 38C (100.4F), Mild Pain (1 - 3), Moderate Pain (4 - 6)  calamine/zinc oxide Lotion 1 Application(s) Topical three times a day PRN Itching        LABS  CBC 07-23-21 @ 01:31                        7.9    22.20 )-----------( 398                   26.0       Hgb trend: 7.9 <-- , 7.8 <-- , 7.7 <-- , 6.6 <-- , 7.1 <-- , 7.6 <-- , 6.4 <--   WBC trend: 22.20 <-- , 20.86 <-- , 20.00 <-- , 14.45 <-- , 14.58 <-- , 12.54 <-- , 10.46 <--     CMP 07-23-21 @ 01:31    134<L>  |  93<L>  |  27<H>  ----------------------------<  127<H>  4.2   |  22  |  4.11<H>    Ca    8.7      07-23-21 @ 01:31  Phos  4.5     07-23  Mg     2.00     07-23    TPro  5.8<L>  /  Alb  2.6<L>  /  TBili  1.1  /  DBili  x   /  AST  42<H>  /  ALT  9   /  AlkPhos  141<H>  07-23      Serum Cr trend: 4.11 <-- , 5.90 <-- , 4.75 <--   PTT - ( 22 Jul 2021 02:39 ):35.6 secr MICU Transfer Note    Transfer from: MICU    Transfer to: (x) Medicine    ( ) Telemetry     ( ) RCU        ( ) Palliative         ( ) Stroke Unit          ( ) __________________    Accepting physician:      MICU COURSE:  71 yo M with PMhx of CAD s/p CABG (complicated by pleural effusion w chest tubes), ESRD on HD (MWF), DMII, HTN, afib on coumadin, and anemia presented from [facility] for evaluation of chest pain and GIB. Patient was admitted to [facility] after developing chest pain during HD. He was found to have Hgb of 6 and elevated trops concerning for GIB and NSTEM II. He was given 2 units of pRBCs and monitored off warfarin/aspirin. He underwent bowel prep but it did not show any bloody stool; no colonoscopy performed in [facility] as no active signs of bleeding. Patient was transferred to [facility] for further evaluation.    MICU was consulted 7/7 due to hypotension to 80/51. Patient was asymptomatic, denying f/c, n/v, dizziness, HA, CP, SOB at rest, abd pain. Midodrine was recommended. He received 20mg at 20:55 and 10mg at 00:00am. On 7/8, the patient continued to be hypotensive, morning dose of Midodrine was not given due to parameters. Chest tubes were placed for loculated pleural effusion and Midodrine was given. Patient became bradycardic and hypotensive, RRT was called due to multifactorial shock possibly 2/2 sepsis iso recent fluid removal from CT and possibly medication induced bradycardia. Pt persistently hypotensive and bradycardic during rapid. Given x 2 with no response. Given push of phenylephrine and started on levophed. Patient transferred to MICU    In MICU patient's BP was managed on levophed. Patient was doing well requiring less pressor support. BCx from 7/7 grew MRSA and so the patient was start on 7/11 on vanco by level (will continue with vanc through 8/22). His chest tube was removed on 7/12 as he wasn't making any output. O/N on 7/13, patient had cardiac arrest w/ vtach, received shocks, epi, coded 10 minutes prior to achieving ROSC and stabilized. Patient was also intubated at that time. Post-cardiac arrest CT head showed two new infarct in his brain. EKG was suggestive of conduction abnormality/heart block and cardiology decided to do a TRUMAN (TTE was inconclusive). Patient underwent TRUMAN with cardiology and was found to have 1.3 cm X 0.3 cm vegetation on posterior leaflet of the mitral valve with a PFO. Unclear etiology of stroke: septic emboli vs blood clot (Afib). CTS was contacted, however, given the patient's medical history and risk, a surgery was not recommended. Course was complicated by a drop in hgb <7 requiring 1u of blood twice o/n. FOBT was negative and GI did not think it was a GI bleed. Patient started complaining of thigh pain and subsequently a CT of right thigh was done on 7/21, which demonstrated a hematoma on his right adductor muscle with no extravasation. Surgery was consulted, they did think a surgery was indicated and recommended ACE-wrap. Patient hgb has now stabilized. He was tolerating well on CPAP and was extubated on 7/23. He was started on midodrine and his levophed was decreased and ultimately discontinued on 7/27. Additionally, 7/24 BCx were positive for  Serratia and hence patient was started on cefepime.    Patient is being followed by nephrology for HD, getting dialysis 3x/wk (MWF). His HD course was deviated given the course of complications in the MICU. Patient is positive 5L for hospital stay.       ASSESSMENT & PLAN:   -c/w IV cefepime 1g daily for serratia bacteremia  -c/w IV vancomycin MWF, dose per level (treatment planned through 8/22)  -c/w midodrine 20mg TID PO  -c/w pantoprazole 40mg BID PO  -c/w diltiazem 30mg QID  -c/w HD MWF  -c/w RetaCrit [id number] Tues/Thurs/Sat  -c/w atorvastatin 40mg daily PO  -c/w dysphagia diet for comfort  -c/w NC for O2 supplementation  -c/w IPC for DVT ppx    For follow-up  -electrophysiology following for PEA. Latest rec is to start lifelong AC. Continue to discuss with them whether patient is appropriate.  -vanc level this PM. Give dose of vanc once HD completed 7/27      Vitals  T(F): 99.9 (07-23-21 @ 16:00), Max: 100 (07-23-21 @ 00:00)  HR: 105 (07-23-21 @ 17:00) (75 - 105)  BP: 84/53 (07-23-21 @ 17:00) (84/53 - 234/209)  BP(mean): 58 (07-23-21 @ 17:00) (48 - 215)  ABP: --  ABP(mean): --  RR: 30 (07-23-21 @ 17:00) (10 - 33)  SpO2: 94% (07-23-21 @ 17:00) (94% - 100%)  I/O Summary 24H    IN: 1004.9 mL / OUT: 2500 mL / NET: -1495.1 mL        MEDICATIONS  (STANDING):  atorvastatin 40 milliGRAM(s) Oral at bedtime  chlorhexidine 2% Cloths 1 Application(s) Topical daily  dextrose 40% Gel 15 Gram(s) Oral once  dextrose 40% Gel 15 Gram(s) Oral once  dextrose 5%. 1000 milliLiter(s) (50 mL/Hr) IV Continuous <Continuous>  dextrose 5%. 1000 milliLiter(s) (100 mL/Hr) IV Continuous <Continuous>  dextrose 50% Injectable 25 Gram(s) IV Push once  dextrose 50% Injectable 12.5 Gram(s) IV Push once  dextrose 50% Injectable 25 Gram(s) IV Push once  diltiazem    Tablet 30 milliGRAM(s) Oral four times a day  epoetin danilo-epbx (RETACRIT) Injectable 58748 Unit(s) IV Push <User Schedule>  glucagon  Injectable 1 milliGRAM(s) IntraMuscular once  insulin lispro (ADMELOG) corrective regimen sliding scale   SubCutaneous every 6 hours  midodrine 10 milliGRAM(s) Oral every 8 hours  norepinephrine Infusion 0.05 MICROgram(s)/kG/Min (8.08 mL/Hr) IV Continuous <Continuous>  pantoprazole  Injectable 40 milliGRAM(s) IV Push daily    MEDICATIONS  (PRN):  acetaminophen   Tablet .. 1000 milliGRAM(s) Oral every 6 hours PRN Temp greater or equal to 38C (100.4F), Mild Pain (1 - 3), Moderate Pain (4 - 6)  calamine/zinc oxide Lotion 1 Application(s) Topical three times a day PRN Itching        LABS  CBC 07-23-21 @ 01:31                        7.9    22.20 )-----------( 398                   26.0       Hgb trend: 7.9 <-- , 7.8 <-- , 7.7 <-- , 6.6 <-- , 7.1 <-- , 7.6 <-- , 6.4 <--   WBC trend: 22.20 <-- , 20.86 <-- , 20.00 <-- , 14.45 <-- , 14.58 <-- , 12.54 <-- , 10.46 <--     CMP 07-23-21 @ 01:31    134<L>  |  93<L>  |  27<H>  ----------------------------<  127<H>  4.2   |  22  |  4.11<H>    Ca    8.7      07-23-21 @ 01:31  Phos  4.5     07-23  Mg     2.00     07-23    TPro  5.8<L>  /  Alb  2.6<L>  /  TBili  1.1  /  DBili  x   /  AST  42<H>  /  ALT  9   /  AlkPhos  141<H>  07-23      Serum Cr trend: 4.11 <-- , 5.90 <-- , 4.75 <--   PTT - ( 22 Jul 2021 02:39 ):35.6 secr MICU Transfer Note    Transfer from: MICU    Transfer to: (x) Medicine    ( ) Telemetry     ( ) RCU        ( ) Palliative         ( ) Stroke Unit          ( ) __________________    Accepting physician: Dr. Philip      MICU COURSE:  71 yo M with PMhx of CAD s/p CABG (complicated by pleural effusion w chest tubes), ESRD on HD (MWF), DMII, HTN, afib on coumadin, and anemia presented from [facility] for evaluation of chest pain and GIB. Patient was admitted to [facility] after developing chest pain during HD. He was found to have Hgb of 6 and elevated trops concerning for GIB and NSTEM II. He was given 2 units of pRBCs and monitored off warfarin/aspirin. He underwent bowel prep but it did not show any bloody stool; no colonoscopy performed in [facility] as no active signs of bleeding. Patient was transferred to [facility] for further evaluation.    MICU was consulted 7/7 due to hypotension to 80/51. Patient was asymptomatic, denying f/c, n/v, dizziness, HA, CP, SOB at rest, abd pain. Midodrine was recommended. He received 20mg at 20:55 and 10mg at 00:00am. On 7/8, the patient continued to be hypotensive, morning dose of Midodrine was not given due to parameters. Chest tubes were placed for loculated pleural effusion and Midodrine was given. Patient became bradycardic and hypotensive, RRT was called due to multifactorial shock possibly 2/2 sepsis iso recent fluid removal from CT and possibly medication induced bradycardia. Pt persistently hypotensive and bradycardic during rapid. Given x 2 with no response. Given push of phenylephrine and started on levophed. Patient transferred to MICU    In MICU patient's BP was managed on levophed. Patient was doing well requiring less pressor support. BCx from 7/7 grew MRSA and so the patient was start on 7/11 on vanco by level (will continue with vanc through 8/22). His chest tube was removed on 7/12 as he wasn't making any output. O/N on 7/13, patient had cardiac arrest w/ vtach, received shocks, epi, coded 10 minutes prior to achieving ROSC and stabilized. Patient was also intubated at that time. Post-cardiac arrest CT head showed two new infarct in his brain. EKG was suggestive of conduction abnormality/heart block and cardiology decided to do a TRUMAN (TTE was inconclusive). Patient underwent TRUMAN with cardiology and was found to have 1.3 cm X 0.3 cm vegetation on posterior leaflet of the mitral valve with a PFO. Unclear etiology of stroke: septic emboli vs blood clot (Afib). CTS was contacted, however, given the patient's medical history and risk, a surgery was not recommended. Course was complicated by a drop in hgb <7 requiring 1u of blood twice o/n. FOBT was negative and GI did not think it was a GI bleed. Patient started complaining of thigh pain and subsequently a CT of right thigh was done on 7/21, which demonstrated a hematoma on his right adductor muscle with no extravasation. Surgery was consulted, they did think a surgery was indicated and recommended ACE-wrap. Patient hgb has now stabilized. He was tolerating well on CPAP and was extubated on 7/23. He was started on midodrine and his levophed was decreased and ultimately discontinued on 7/27. Additionally, 7/24 BCx were positive for  Serratia and hence patient was started on cefepime.    Patient is being followed by nephrology for HD, getting dialysis 3x/wk (MWF). His HD course was deviated given the course of complications in the MICU. Patient is positive 5L for hospital stay.       ASSESSMENT & PLAN:   -c/w IV cefepime 1g daily for serratia bacteremia  -c/w IV vancomycin MWF, dose per level (treatment planned through 8/22)  -c/w midodrine 20mg TID PO  -c/w pantoprazole 40mg BID PO  -c/w diltiazem 30mg QID  -c/w HD MWF  -c/w RetaCrit [id number] Tues/Thurs/Sat  -c/w atorvastatin 40mg daily PO  -c/w dysphagia diet for comfort  -c/w NC for O2 supplementation  -c/w IPC for DVT ppx    For follow-up  -electrophysiology following for PEA. Latest rec is to start lifelong AC. Continue to discuss with them whether patient is appropriate.  -vanc level this PM. Give dose of vanc once HD completed 7/27      Vitals  T(F): 99.9 (07-23-21 @ 16:00), Max: 100 (07-23-21 @ 00:00)  HR: 105 (07-23-21 @ 17:00) (75 - 105)  BP: 84/53 (07-23-21 @ 17:00) (84/53 - 234/209)  BP(mean): 58 (07-23-21 @ 17:00) (48 - 215)  ABP: --  ABP(mean): --  RR: 30 (07-23-21 @ 17:00) (10 - 33)  SpO2: 94% (07-23-21 @ 17:00) (94% - 100%)  I/O Summary 24H    IN: 1004.9 mL / OUT: 2500 mL / NET: -1495.1 mL        MEDICATIONS  (STANDING):  atorvastatin 40 milliGRAM(s) Oral at bedtime  chlorhexidine 2% Cloths 1 Application(s) Topical daily  dextrose 40% Gel 15 Gram(s) Oral once  dextrose 40% Gel 15 Gram(s) Oral once  dextrose 5%. 1000 milliLiter(s) (50 mL/Hr) IV Continuous <Continuous>  dextrose 5%. 1000 milliLiter(s) (100 mL/Hr) IV Continuous <Continuous>  dextrose 50% Injectable 25 Gram(s) IV Push once  dextrose 50% Injectable 12.5 Gram(s) IV Push once  dextrose 50% Injectable 25 Gram(s) IV Push once  diltiazem    Tablet 30 milliGRAM(s) Oral four times a day  epoetin danilo-epbx (RETACRIT) Injectable 39284 Unit(s) IV Push <User Schedule>  glucagon  Injectable 1 milliGRAM(s) IntraMuscular once  insulin lispro (ADMELOG) corrective regimen sliding scale   SubCutaneous every 6 hours  midodrine 10 milliGRAM(s) Oral every 8 hours  norepinephrine Infusion 0.05 MICROgram(s)/kG/Min (8.08 mL/Hr) IV Continuous <Continuous>  pantoprazole  Injectable 40 milliGRAM(s) IV Push daily    MEDICATIONS  (PRN):  acetaminophen   Tablet .. 1000 milliGRAM(s) Oral every 6 hours PRN Temp greater or equal to 38C (100.4F), Mild Pain (1 - 3), Moderate Pain (4 - 6)  calamine/zinc oxide Lotion 1 Application(s) Topical three times a day PRN Itching        LABS  CBC 07-23-21 @ 01:31                        7.9    22.20 )-----------( 398                   26.0       Hgb trend: 7.9 <-- , 7.8 <-- , 7.7 <-- , 6.6 <-- , 7.1 <-- , 7.6 <-- , 6.4 <--   WBC trend: 22.20 <-- , 20.86 <-- , 20.00 <-- , 14.45 <-- , 14.58 <-- , 12.54 <-- , 10.46 <--     CMP 07-23-21 @ 01:31    134<L>  |  93<L>  |  27<H>  ----------------------------<  127<H>  4.2   |  22  |  4.11<H>    Ca    8.7      07-23-21 @ 01:31  Phos  4.5     07-23  Mg     2.00     07-23    TPro  5.8<L>  /  Alb  2.6<L>  /  TBili  1.1  /  DBili  x   /  AST  42<H>  /  ALT  9   /  AlkPhos  141<H>  07-23      Serum Cr trend: 4.11 <-- , 5.90 <-- , 4.75 <--   PTT - ( 22 Jul 2021 02:39 ):35.6 secr MICU Transfer Note    Transfer from: MICU    Transfer to: (x) Medicine    ( ) Telemetry     ( ) RCU        ( ) Palliative         ( ) Stroke Unit          ( ) __________________    Accepting physician: Dr. Philip      MICU COURSE:  71 yo M with PMhx of CAD s/p CABG (complicated by pleural effusion w chest tubes), ESRD on HD (MWF), DMII, HTN, afib on coumadin, and anemia presented from [facility] for evaluation of chest pain and GIB. Patient was admitted to [facility] after developing chest pain during HD. He was found to have Hgb of 6 and elevated trops concerning for GIB and NSTEM II. He was given 2 units of pRBCs and monitored off warfarin/aspirin. He underwent bowel prep but it did not show any bloody stool; no colonoscopy performed in [facility] as no active signs of bleeding. Patient was transferred to [facility] for further evaluation.    MICU was consulted 7/7 due to hypotension to 80/51. Patient was asymptomatic, denying f/c, n/v, dizziness, HA, CP, SOB at rest, abd pain. Midodrine was recommended. He received 20mg at 20:55 and 10mg at 00:00am. On 7/8, the patient continued to be hypotensive, morning dose of Midodrine was not given due to parameters. Chest tubes were placed for loculated pleural effusion and Midodrine was given. Patient became bradycardic and hypotensive, RRT was called due to multifactorial shock possibly 2/2 sepsis iso recent fluid removal from CT and possibly medication induced bradycardia. Pt persistently hypotensive and bradycardic during rapid. Given x 2 with no response. Given push of phenylephrine and started on levophed. Patient transferred to MICU    In MICU patient's BP was managed on levophed. Patient was doing well requiring less pressor support. BCx from 7/7 grew MRSA and so the patient was start on 7/11 on vanco by level (will continue with vanc through 8/22). His chest tube was removed on 7/12 as he wasn't making any output. O/N on 7/13, patient had cardiac arrest w/ vtach, received shocks, epi, coded 10 minutes prior to achieving ROSC and stabilized. Patient was also intubated at that time. Post-cardiac arrest CT head showed two new infarct in his brain. EKG was suggestive of conduction abnormality/heart block and cardiology decided to do a TRUMAN (TTE was inconclusive). Patient underwent TRUMAN with cardiology and was found to have 1.3 cm X 0.3 cm vegetation on posterior leaflet of the mitral valve with a PFO. Unclear etiology of stroke: septic emboli vs blood clot (Afib). CTS was contacted, however, given the patient's medical history and risk, a surgery was not recommended. Course was complicated by a drop in hgb <7 requiring 1u of blood twice o/n. FOBT was negative and GI did not think it was a GI bleed. Patient started complaining of thigh pain and subsequently a CT of right thigh was done on 7/21, which demonstrated a hematoma on his right adductor muscle with no extravasation. Surgery was consulted, they did think a surgery was indicated and recommended ACE-wrap. Patient hgb has now stabilized. He was tolerating well on CPAP and was extubated on 7/23. He was started on midodrine and his levophed was decreased and ultimately discontinued on 7/27. Additionally, 7/24 BCx were positive for  Serratia and hence patient was started on cefepime.    Patient is being followed by nephrology for HD, getting dialysis 3x/wk (MWF). His HD course was deviated given the course of complications in the MICU. Patient is positive 5L for hospital stay.       ASSESSMENT & PLAN:   -c/w IV cefepime 1g daily for serratia bacteremia  -c/w IV vancomycin MWF, dose per level (treatment planned through 8/22)  -c/w midodrine 20mg TID PO  -c/w pantoprazole 40mg BID PO  -c/w diltiazem 30mg QID  -c/w HD MWF  -c/w RetaCrit [id number] Tues/Thurs/Sat  -c/w atorvastatin 40mg daily PO  -c/w dysphagia diet for comfort  -c/w NC for O2 supplementation  -c/w IPC for DVT ppx    For follow-up  -electrophysiology following for PEA. Latest rec is to start lifelong AC. Continue to discuss with them whether patient is appropriate.  -vanc level this PM. Give dose of vanc once HD completed 7/27      Vitals  T(F): 99.9 (07-23-21 @ 16:00), Max: 100 (07-23-21 @ 00:00)  HR: 105 (07-23-21 @ 17:00) (75 - 105)  BP: 84/53 (07-23-21 @ 17:00) (84/53 - 234/209)  BP(mean): 58 (07-23-21 @ 17:00) (48 - 215)  ABP: --  ABP(mean): --  RR: 30 (07-23-21 @ 17:00) (10 - 33)  SpO2: 94% (07-23-21 @ 17:00) (94% - 100%)  I/O Summary 24H    IN: 1004.9 mL / OUT: 2500 mL / NET: -1495.1 mL        MEDICATIONS  (STANDING):  atorvastatin 40 milliGRAM(s) Oral at bedtime  chlorhexidine 2% Cloths 1 Application(s) Topical daily  dextrose 40% Gel 15 Gram(s) Oral once  dextrose 40% Gel 15 Gram(s) Oral once  dextrose 5%. 1000 milliLiter(s) (50 mL/Hr) IV Continuous <Continuous>  dextrose 5%. 1000 milliLiter(s) (100 mL/Hr) IV Continuous <Continuous>  dextrose 50% Injectable 25 Gram(s) IV Push once  dextrose 50% Injectable 12.5 Gram(s) IV Push once  dextrose 50% Injectable 25 Gram(s) IV Push once  diltiazem    Tablet 30 milliGRAM(s) Oral four times a day  epoetin danilo-epbx (RETACRIT) Injectable 79027 Unit(s) IV Push <User Schedule>  glucagon  Injectable 1 milliGRAM(s) IntraMuscular once  insulin lispro (ADMELOG) corrective regimen sliding scale   SubCutaneous every 6 hours  midodrine 10 milliGRAM(s) Oral every 8 hours  norepinephrine Infusion 0.05 MICROgram(s)/kG/Min (8.08 mL/Hr) IV Continuous <Continuous>  pantoprazole  Injectable 40 milliGRAM(s) IV Push daily    MEDICATIONS  (PRN):  acetaminophen   Tablet .. 1000 milliGRAM(s) Oral every 6 hours PRN Temp greater or equal to 38C (100.4F), Mild Pain (1 - 3), Moderate Pain (4 - 6)  calamine/zinc oxide Lotion 1 Application(s) Topical three times a day PRN Itching        LABS  CBC 07-23-21 @ 01:31                        7.9    22.20 )-----------( 398                   26.0       Hgb trend: 7.9 <-- , 7.8 <-- , 7.7 <-- , 6.6 <-- , 7.1 <-- , 7.6 <-- , 6.4 <--   WBC trend: 22.20 <-- , 20.86 <-- , 20.00 <-- , 14.45 <-- , 14.58 <-- , 12.54 <-- , 10.46 <--     CMP 07-23-21 @ 01:31    134<L>  |  93<L>  |  27<H>  ----------------------------<  127<H>  4.2   |  22  |  4.11<H>    Ca    8.7      07-23-21 @ 01:31  Phos  4.5     07-23  Mg     2.00     07-23    TPro  5.8<L>  /  Alb  2.6<L>  /  TBili  1.1  /  DBili  x   /  AST  42<H>  /  ALT  9   /  AlkPhos  141<H>  07-23      Serum Cr trend: 4.11 <-- , 5.90 <-- , 4.75 <--   PTT - ( 22 Jul 2021 02:39 ):35.6 secr  71 yo M with PMhx of CAD s/p CABG (course at that time complicated by pleural effusion requiring chest tubes), ESRD on HD (MWF), DMII, HTN, afib on coumadin, and anemia transferred from [facility] for evaluation of chest pain after HD and GIB s/p 2U PRBC transfusion and aflutter/afib with RVR managed with metoprolol. Admitted to MICU for hypotension dependent on pressors likely 2/2 to sepsis. In MICU, patient had a cardiac arrest on 7/13 requiring CPR, 3x epi, and 2x shock (for VTach). Course now complicated by recent drop in hgb requiring pRBCs. CT of leg shows hematoma on right adductor. Patient is lethargic, suggesting compromised mental status. AMS likely 2/2 R frontal CVA and anoxic brain injury in the setting of cardiac arrest. Patient appropriate for transfer to medicine floor when bed is available.    #Neuro  - Off of sedation. Alert, lethargic, not following commands  - CT head shows foci of low density in the R anterolateral frontal lobe and L posterior temporal lobe concerning for acute infarcts --> repeat CT head 7/27 shows no acute infarcts/hemorrhages, +evolving R frontal infarct  - Mechanism likely cardioembolic in setting of patient with known atrial fibrillation with cardiac arrest vs septic emboli  - per neurology, can consider MRI brain w/o contrast. However, unlikely to  since patient has known atrial fibrillation and will need to be continued of full dose anticoagulation. Currently holding given recent bleeding  - Avoid hypotension, keep -180 for neuroprotection  - c/w Atorvastatin 40MG QHS for secondary stroke prevention.  - more lethargic during team rounds, obtained ABG on 7/25 to evaluate change in mental status --> pH 7.38 wnl    #Cardiovascular  - Cardiac arrest: Patient had asystole night of 7/13 and CPR was started. S/p epi x3 and shock x2 with return to NSR. Cards is following  - TTE on 7/13 showed Mild mitral regurgitation with biventricular dysfunction of LV and RV systolic function. Endocardium was not well visualized, Hypokinesis of the basal inferior wall and basal inferoseptum.  - TRUMAN on 7/19 showed a large echodensity consisted with endocarditis and PFO  - Cardiology spoke with CTS, no surgery at this time. (Dr. Herzog)  - c/w Levophed wean as tolerated. Currently stable on 0.05  - c/w midodrine 20mg TID to help assist weaning off of pressors   - BP goal is >100 systolic  - troponinemia likely secondary to demand ischemia and ESRD     A. Fib  - on coumadin at home,  - holding heparin in setting of recent drop in hgb  - From neurology stand point, heparin is sufficient for now. Will need to be switched to DOAC in the future if kidney function allows  - 7/11: tried to transition heparin gtt to eliquis but pt is not a candidate due to his acute MIGUEL; back on heparin gtt  - c/w diltiazem for rate control    #Respiratory  - Loculated Pleural effusions: chest tube removed  - pleural fluid cx: (-); transudative.  - Currently O2 sat 99% on 6L NC. Tried to wean down to 4L and patient desatted, will keep 6L at this time  - CXR s/p chest tube removal showed no evidence of atelectasis. Demonstrated loculated left pleural effusion decreased in size and new hazy right lower lung opacity which could be due to a small layering right pleural effusion with associated passive atelectasis, atelectasis of other cause, and/or pneumonia.  - abx as below    #GI/Nutrition:  - advanced to dysphagia diet on 7/27  - RUQ US 7/26 - no acute pathology, liver findings consistent with known HF  - Was c/o abdominal pain on presentation, issue has resolved (patient appears comfortable)  - negative occult bleeding, hgb stable  - FOBT negative  - Zofran PRN for Nausea.    #/Renal  - ESRD: HD on MWF. Most recent Scr 4.6. Potassium is stable.   - Last dialysis session 7/24. Received dialysis overnight 7/25. HD planned for 7/27  - Recommend removing 2-3L of fluids as patient is net positive for I&Os. Discussed with nephro  - Electrolytes stable  - c/w Epo 20k tiw with HD  - Scotal US demonstrates swelling in the scrotum, improving with dialysis yesterday. likely in setting of fluid overload. Manage as above      #Skin  - HD Access: fistula in left upper arm  - Improving erythematous and tender on right wrist. Continue to monitor. RUE US neg for thrombus  - US of fistula showed patent fistula with no abscess  - Arrow is patent in right upper extremity.    #ID  - S aureus PCR: (+),  MRSA PCR negative  - BCx on 7/7 now growing MRSA: restarted on vanc (7/11- )  - Vanc level 7/23 was 24, dose held, plan for after dialysis saturday 7/24 --> next dose due 7/27  - Blood Cx 7/22 growing gram negative rods, Serratia on Cefepime (7/24 - ).  -discussed case with ID. They discourage narrowing to ceftriaxone due to Serratia's tendency to develop beta lactamase. Alternative agent would be cipro. If desired to rule-out possibility of additional infectious source, rec CT abdomen/pelvis.    #Endocrine  - DMII: c/w Lispro SS. Monitor glucose.      #Hematologic/DVT ppx  - monitor PTT and cbc q12  - holding heparin in setting of hematoma and GIB  - electrophysiology consult - recommended lifelong AC in setting of PEA, will start if appropriate  - Patient complaining of right thigh pain. CTA demonstrated A moderate-sized hematoma in the right adductor muscle group.  - Contacted surgery regarding the hematoma, no surgery at this time. c/w with ACE wrap at this time.  - PRN dilaudid for pain control    #Ethics  - DNR/DNI    FOR FOLLOW-UP:  -electrophysiology following for PEA. Latest rec is to start lifelong AC. Continue to discuss with them whether patient is appropriate.  -vanc level this PM. Give dose of vanc once HD completed 7/27  -c/w IV cefepime 1g daily for serratia bacteremia  -c/w IV vancomycin MWF, dose per level (treatment planned through 8/22)  -c/w HD MWF as tolerated  -c/w dysphagia diet for comfort  -F/u cards regarding restarting AC

## 2021-07-23 NOTE — PROGRESS NOTE ADULT - SUBJECTIVE AND OBJECTIVE BOX
Follow Up:      Inverval History/ROS:Patient is a 70y old  Male who presents with a chief complaint of Chest pain and GIB (23 Jul 2021 17:41)    No fever  Extubated- on face mask    Allergies    lisinopril (Other)  opioid-like analgesics (Other)    Intolerances        ANTIMICROBIALS:  vancomycin  IVPB 750 <User Schedule>      OTHER MEDS:  acetaminophen   Tablet .. 1000 milliGRAM(s) Oral every 6 hours PRN  atorvastatin 40 milliGRAM(s) Oral at bedtime  calamine/zinc oxide Lotion 1 Application(s) Topical three times a day PRN  chlorhexidine 2% Cloths 1 Application(s) Topical daily  dextrose 40% Gel 15 Gram(s) Oral once  dextrose 40% Gel 15 Gram(s) Oral once  dextrose 5%. 1000 milliLiter(s) IV Continuous <Continuous>  dextrose 5%. 1000 milliLiter(s) IV Continuous <Continuous>  dextrose 50% Injectable 25 Gram(s) IV Push once  dextrose 50% Injectable 12.5 Gram(s) IV Push once  dextrose 50% Injectable 25 Gram(s) IV Push once  diltiazem    Tablet 30 milliGRAM(s) Oral four times a day  epoetin danilo-epbx (RETACRIT) Injectable 09232 Unit(s) IV Push <User Schedule>  glucagon  Injectable 1 milliGRAM(s) IntraMuscular once  insulin lispro (ADMELOG) corrective regimen sliding scale   SubCutaneous every 6 hours  midodrine 10 milliGRAM(s) Oral every 8 hours  norepinephrine Infusion 0.05 MICROgram(s)/kG/Min IV Continuous <Continuous>  pantoprazole  Injectable 40 milliGRAM(s) IV Push daily      Vital Signs Last 24 Hrs  T(C): 37.7 (23 Jul 2021 16:00), Max: 37.8 (23 Jul 2021 00:00)  T(F): 99.9 (23 Jul 2021 16:00), Max: 100 (23 Jul 2021 00:00)  HR: 105 (23 Jul 2021 17:00) (75 - 105)  BP: 84/53 (23 Jul 2021 17:00) (84/53 - 234/209)  BP(mean): 58 (23 Jul 2021 17:00) (48 - 215)  RR: 30 (23 Jul 2021 17:00) (10 - 33)  SpO2: 94% (23 Jul 2021 17:00) (94% - 100%)    PHYSICAL EXAM:  General: [ ] non-toxic  HEAD/EYES: [ ] PERRL [ x] white sclera [ ] icterus  ENT:  [ ] normal [x ] supple [ ] thrush [ ] pharyngeal exudate  Cardiovascular:   [ ] murmur [x ] normal [ ] PPM/AICD  Respiratory:  x[ ] clear to ausculation bilaterally  GI:  x[ ] soft, non-tender, normal bowel sounds  :  [ ] wagner [ ] no CVA tenderness   Musculoskeletal:  [ ] no synovitis  Neurologic:  [ ] non-focal exam   Skin:  [ x] no rash  Lymph: [ x] no lymphadenopathy  Psychiatric:  [ ] appropriate affect [ ] alert & oriented  Lines:  [x ] no phlebitis [ ] central line                                7.9    22.20 )-----------( 398      ( 23 Jul 2021 01:31 )             26.0       07-23    134<L>  |  93<L>  |  27<H>  ----------------------------<  127<H>  4.2   |  22  |  4.11<H>    Ca    8.7      23 Jul 2021 01:31  Phos  4.5     07-23  Mg     2.00     07-23    TPro  5.8<L>  /  Alb  2.6<L>  /  TBili  1.1  /  DBili  x   /  AST  42<H>  /  ALT  9   /  AlkPhos  141<H>  07-23          MICROBIOLOGY:    RADIOLOGY:

## 2021-07-23 NOTE — PROGRESS NOTE ADULT - SUBJECTIVE AND OBJECTIVE BOX
[facility] - S Eugene / Marlena S /D Cruz/ S Narendra/ S Colleen/ Alexandre Esposito / WILMER Hermosillou/ O Jessi  service -[phone], office [phone]  ---------------------------------------------------------------------------------------------------------------    Patient seen and examined bedside    Subjective and Objective: No overnight events, sob resolved. No complaints today. feeling better    Allergies: lisinopril (Other)  opioid-like analgesics (Other)      Hospital Medications:   MEDICATIONS  (STANDING):  atorvastatin 40 milliGRAM(s) Oral at bedtime  chlorhexidine 2% Cloths 1 Application(s) Topical daily  dextrose 40% Gel 15 Gram(s) Oral once  dextrose 40% Gel 15 Gram(s) Oral once  dextrose 5%. 1000 milliLiter(s) (50 mL/Hr) IV Continuous <Continuous>  dextrose 5%. 1000 milliLiter(s) (100 mL/Hr) IV Continuous <Continuous>  dextrose 50% Injectable 25 Gram(s) IV Push once  dextrose 50% Injectable 12.5 Gram(s) IV Push once  dextrose 50% Injectable 25 Gram(s) IV Push once  diltiazem    Tablet 30 milliGRAM(s) Oral four times a day  epoetin danilo-epbx (RETACRIT) Injectable 07455 Unit(s) IV Push <User Schedule>  glucagon  Injectable 1 milliGRAM(s) IntraMuscular once  insulin lispro (ADMELOG) corrective regimen sliding scale   SubCutaneous every 6 hours  midodrine 10 milliGRAM(s) Oral every 8 hours  norepinephrine Infusion 0.05 MICROgram(s)/kG/Min (8.08 mL/Hr) IV Continuous <Continuous>  pantoprazole  Injectable 40 milliGRAM(s) IV Push daily      REVIEW OF SYSTEMS:  CONSTITUTIONAL: No weakness, fevers or chills  EYES/ENT: No visual changes;  No vertigo or throat pain   NECK: No pain or stiffness  RESPIRATORY: No cough, wheezing, hemoptysis; No shortness of breath  CARDIOVASCULAR: No chest pain or palpitations.  GASTROINTESTINAL: No abdominal or epigastric pain. No nausea, vomiting, or hematemesis; No diarrhea or constipation. No melena or hematochezia.  GENITOURINARY: No dysuria, frequency, foamy urine, urinary urgency, incontinence or hematuria  NEUROLOGICAL: No numbness or weakness  SKIN: No itching, burning, rashes, or lesions   VASCULAR: No bilateral lower extremity edema.   All other review of systems is negative unless indicated above.    VITALS:  T(F): 99.9 (07-23-21 @ 16:00), Max: 100 (07-23-21 @ 00:00)  HR: 105 (07-23-21 @ 17:00)  BP: 84/53 (07-23-21 @ 17:00)  RR: 30 (07-23-21 @ 17:00)  SpO2: 94% (07-23-21 @ 17:00)  Wt(kg): --    07-22 @ 07:01  -  07-23 @ 07:00  --------------------------------------------------------  IN: 1004.9 mL / OUT: 2500 mL / NET: -1495.1 mL    07-23 @ 07:01  -  07-23 @ 17:41  --------------------------------------------------------  IN: 79.6 mL / OUT: 0 mL / NET: 79.6 mL          PHYSICAL EXAM:  Constitutional: NAD  HEENT: anicteric sclera, oropharynx clear  Neck: No JVD  Respiratory: CTAB, no wheezes, rales or rhonchi  Cardiovascular: S1, S2, RRR  Gastrointestinal: BS+, soft, NT/ND  Extremities: No cyanosis or clubbing. No peripheral edema  Neurological: A/O x 3, no focal deficits  Psychiatric: Normal mood, normal affect  : No CVA tenderness. No wagner.   Skin: No rashes  Vascular Access:    LABS:  07-23    134<L>  |  93<L>  |  27<H>  ----------------------------<  127<H>  4.2   |  22  |  4.11<H>    Ca    8.7      23 Jul 2021 01:31  Phos  4.5     07-23  Mg     2.00     07-23    TPro  5.8<L>  /  Alb  2.6<L>  /  TBili  1.1  /  DBili      /  AST  42<H>  /  ALT  9   /  AlkPhos  141<H>  07-23    Creatinine Trend: 4.11 <--, 5.90 <--, 4.75 <--, 6.78 <--, 5.79 <--, 5.45 <--, 4.43 <--, 6.22 <--                        7.9    22.20 )-----------( 398      ( 23 Jul 2021 01:31 )             26.0     Urine Studies:        RADIOLOGY & ADDITIONAL STUDIES:   [facility] - S Eugene / Marlena S /D Cruz/ S Narendra/ WILY Macias/ Alexandre Esposito / WILMER Hermosillou/ O Jessi  service -[phone], office [phone]  ---------------------------------------------------------------------------------------------------------------    Patient seen and examined bedside in MICU    Subjective and Objective: No overnight events, denied sob. No complaints today.  remians on norepinephrine drip    Allergies: lisinopril (Other)  opioid-like analgesics (Other)      Hospital Medications:   MEDICATIONS  (STANDING):  atorvastatin 40 milliGRAM(s) Oral at bedtime  chlorhexidine 2% Cloths 1 Application(s) Topical daily  dextrose 40% Gel 15 Gram(s) Oral once  dextrose 40% Gel 15 Gram(s) Oral once  dextrose 5%. 1000 milliLiter(s) (50 mL/Hr) IV Continuous <Continuous>  dextrose 5%. 1000 milliLiter(s) (100 mL/Hr) IV Continuous <Continuous>  dextrose 50% Injectable 25 Gram(s) IV Push once  dextrose 50% Injectable 12.5 Gram(s) IV Push once  dextrose 50% Injectable 25 Gram(s) IV Push once  diltiazem    Tablet 30 milliGRAM(s) Oral four times a day  epoetin danilo-epbx (RETACRIT) Injectable 05099 Unit(s) IV Push <User Schedule>  glucagon  Injectable 1 milliGRAM(s) IntraMuscular once  insulin lispro (ADMELOG) corrective regimen sliding scale   SubCutaneous every 6 hours  midodrine 10 milliGRAM(s) Oral every 8 hours  norepinephrine Infusion 0.05 MICROgram(s)/kG/Min (8.08 mL/Hr) IV Continuous <Continuous>  pantoprazole  Injectable 40 milliGRAM(s) IV Push daily      VITALS:  T(F): 99.9 (07-23-21 @ 16:00), Max: 100 (07-23-21 @ 00:00)  HR: 105 (07-23-21 @ 17:00)  BP: 84/53 (07-23-21 @ 17:00)  RR: 30 (07-23-21 @ 17:00)  SpO2: 94% (07-23-21 @ 17:00)  Wt(kg): --    07-22 @ 07:01  -  07-23 @ 07:00  --------------------------------------------------------  IN: 1004.9 mL / OUT: 2500 mL / NET: -1495.1 mL    07-23 @ 07:01  -  07-23 @ 17:41  --------------------------------------------------------  IN: 79.6 mL / OUT: 0 mL / NET: 79.6 mL      PHYSICAL EXAM:  Constitutional: NAD  HEENT: anicteric sclera  Neck: No JVD  Respiratory: CTAB, no wheezes, rales or rhonchi  Cardiovascular: S1, S2, RRR  Gastrointestinal: BS+, soft, NT/ND  Extremities: 1+ peripheral edema b/l   Neurological: awake, alert   Psychiatric: Normal mood, normal affect  : No bernard.   WES AVF+thrill     LABS:  07-23    134<L>  |  93<L>  |  27<H>  ----------------------------<  127<H>  4.2   |  22  |  4.11<H>    Ca    8.7      23 Jul 2021 01:31  Phos  4.5     07-23  Mg     2.00     07-23    TPro  5.8<L>  /  Alb  2.6<L>  /  TBili  1.1  /  DBili      /  AST  42<H>  /  ALT  9   /  AlkPhos  141<H>  07-23    Creatinine Trend: 4.11 <--, 5.90 <--, 4.75 <--, 6.78 <--, 5.79 <--, 5.45 <--, 4.43 <--, 6.22 <--                        7.9    22.20 )-----------( 398       23 Jul 2021 01:31 )             26.0     Urine Studies:        RADIOLOGY & ADDITIONAL STUDIES:

## 2021-07-23 NOTE — CHART NOTE - NSCHARTNOTEFT_GEN_A_CORE
: Rika Marc PGY-3    INDICATION:    PROCEDURE:  [ X] LIMITED ECHO  [ X] LIMITED CHEST  [ ] LIMITED RETROPERITONEAL  [ ] LIMITED ABDOMINAL  [ ] LIMITED DVT  [ ] NEEDLE GUIDANCE VASCULAR  [ ] NEEDLE GUIDANCE THORACENTESIS  [ ] NEEDLE GUIDANCE PARACENTESIS  [ ] NEEDLE GUIDANCE PERICARDIOCENTESIS  [ ] OTHER    FINDINGS:  Lungs: <2 B lines noted bilaterally. A line predominate. Notable large left sided pleural effusion with atelectasis, with mild septation noted at base abutting the diaphragm. Mild pleural effusion noted on right.   Heart: Good LV systolic function. No WMA noted.       INTERPRETATION:  Lungs: <2 B lines noted bilaterally. A line predominate. Notable large left sided pleural effusion with atelectasis, with mild septation noted at base abutting the diaphragm. Mild pleural effusion noted on right.   Heart: Good LV systolic function. No WMA noted.       Imaged Uploaded to Autumn Marc, PGY-3

## 2021-07-23 NOTE — CONSULT NOTE ADULT - ATTENDING COMMENTS
69 yo M with PMhx of CAD s/p CABG (course at that time complicated by pleural effusion requiring chest tubes), ESRD on HD (MWF), DMII, HTN, afib on coumadin, and anemia p/w GIB c/b sepsis likely 2/2 loculated left pleural effusion. MICU was consulted for hypotension to 80/51.  sepsis possibly secondary to loculated pleural effusion  pancx, Abx would continue zosyn, add vanco (by level) given h/o esrd   midodrine 20mg tid as tolerated  s/p 500cc IV bolus with improvement in his , mentating well   reconsult as needed  DVt ppx heparin sq  Full code
seen in ICU  Breifly 71 yo M with cad s/p cabg, esrd, dm, htn, afib, anemia h/o GIB hosp course c/b cardiac arrest 7/13 ROSC 10 min. CTH with embolic appearing infarcts. likely 2/2 AF as opposed to hypoperfusion  c/w AC and statin therapy from neuro standpioint.   A1c 5.5  needs lipid panel   spoke with team  Avi Ballesteros MD  Vascular Neurology  Office: [phone]
ESRD s/p cardiac arrest.  +endocarditis.  overall prognosis is poor.  appears to have had extensive discussion with pc team at [facility]- pt was dnr/dnii at that time. he did not meet hospice criteria as he was still maintaining on HD.  Attempted to speak with son multiple times but states he needed at least an hour to have discussion with us re: his fathers condition.  Made appointent for 11am on monday to speak

## 2021-07-23 NOTE — PROGRESS NOTE ADULT - ASSESSMENT
70 year old with ESRD, DM, CAD presented with anemia and chest pain    Recently diagnosed Staph aureus bacteremia  Endocarditis  Course complicated by arrest, imaging with ? CVA    1)Staph bacteremia- ? phlebitis as focus  -prelim results suggested MSSA  now identified as MRSA-     Repeat blood cultures 7/9 with growth in 2 of 4 bottles  Repeat blood culture 7/11 and 7/12 without growth    TRUMAN with MV endocarditis  Consider imaging LS spine when more stable- had c/p back pain     vancomycin 750 mg after HD on Tuesday/thrusday/ Saturday    2) Fever  Likely due to bacteremia  Monitor    3) Pleural effusion- chronic - drained in 6/2020; repeat thoracentesis this admission    S/p thoracentesis- appears transudative     4) Leukocytosis  continue to monitor    5) ESRD  on vanco dosed on HD days    Prognosis guarded    Call the ID service with questions or concerns over the weekend.  [phone]

## 2021-07-23 NOTE — PROGRESS NOTE ADULT - SUBJECTIVE AND OBJECTIVE BOX
CARDIOLOGY ATTENDING    tele - SR    S: remains intubated; moving extremities; ros limited but negative.     Review of Systems:   Constitutional: [ ] fevers, [ ] chills.   Skin: [ ] dry skin. [ ] rashes.  Psychiatric: [ ] depression, [ ] anxiety.   Gastrointestinal: [ ] BRBPR, [ ] melena.   Neurological: [ ] confusion. [ ] seizures. [ ] shuffling gait.   Ears,Nose,Mouth and Throat: [ ] ear pain [ ] sore throat.   Eyes: [ ] diplopia.   Respiratory: [ ] hemoptysis. [ ] shortness of breath  Cardiovascular: See HPI above  Hematologic/Lymphatic: [ ] anemia. [ ] painful nodes. [ ] prolonged bleeding.   Genitourinary: [ ] hematuria. [ ] flank pain.   Endocrine: [ ] significant change in weight. [ ] intolerance to heat and cold.     Review of systems [ ] otherwise negative, [x ] otherwise unable to obtain    FH: no family history of sudden cardiac death in first degree relatives    SH: [ ] tobacco, [ ] alcohol, [ ] drugs      acetaminophen   Tablet .. 1000 milliGRAM(s) Oral every 6 hours PRN  atorvastatin 40 milliGRAM(s) Oral at bedtime  calamine/zinc oxide Lotion 1 Application(s) Topical three times a day PRN  chlorhexidine 0.12% Liquid 15 milliLiter(s) Oral Mucosa every 12 hours  chlorhexidine 2% Cloths 1 Application(s) Topical daily  dextrose 40% Gel 15 Gram(s) Oral once  dextrose 40% Gel 15 Gram(s) Oral once  dextrose 5%. 1000 milliLiter(s) IV Continuous <Continuous>  dextrose 5%. 1000 milliLiter(s) IV Continuous <Continuous>  dextrose 50% Injectable 25 Gram(s) IV Push once  dextrose 50% Injectable 12.5 Gram(s) IV Push once  dextrose 50% Injectable 25 Gram(s) IV Push once  diltiazem    Tablet 30 milliGRAM(s) Oral four times a day  epoetin danilo-epbx (RETACRIT) Injectable 73867 Unit(s) IV Push <User Schedule>  glucagon  Injectable 1 milliGRAM(s) IntraMuscular once  insulin lispro (ADMELOG) corrective regimen sliding scale   SubCutaneous every 6 hours  midodrine 10 milliGRAM(s) Oral every 8 hours  norepinephrine Infusion 0.02 MICROgram(s)/kG/Min IV Continuous <Continuous>  pantoprazole  Injectable 40 milliGRAM(s) IV Push daily                            7.9    22.20 )-----------( 398      ( 23 Jul 2021 01:31 )             26.0       07-23    134<L>  |  93<L>  |  27<H>  ----------------------------<  127<H>  4.2   |  22  |  4.11<H>    Ca    8.7      23 Jul 2021 01:31  Phos  4.5     07-23  Mg     2.00     07-23    TPro  5.8<L>  /  Alb  2.6<L>  /  TBili  1.1  /  DBili  x   /  AST  42<H>  /  ALT  9   /  AlkPhos  141<H>  07-23      CARDIAC MARKERS ( 23 Jul 2021 01:31 )  x     / x     / 599 U/L / x     / x      CARDIAC MARKERS ( 22 Jul 2021 02:39 )  x     / x     / 610 U/L / x     / x      CARDIAC MARKERS ( 21 Jul 2021 18:08 )  x     / x     / 633 U/L / x     / x            T(C): 37.1 (07-23-21 @ 08:00), Max: 37.9 (07-22-21 @ 17:05)  HR: 91 (07-23-21 @ 11:45) (59 - 93)  BP: 100/53 (07-23-21 @ 11:45) (84/68 - 234/209)  RR: 25 (07-23-21 @ 11:45) (9 - 31)  SpO2: 98% (07-23-21 @ 11:45) (95% - 100%)  Wt(kg): --    I&O's Summary    22 Jul 2021 07:01  -  23 Jul 2021 07:00  --------------------------------------------------------  IN: 1004.9 mL / OUT: 2500 mL / NET: -1495.1 mL    23 Jul 2021 07:01  -  23 Jul 2021 12:22  --------------------------------------------------------  IN: 28.2 mL / OUT: 0 mL / NET: 28.2 mL        General: intubated; sedated   Head: Normocephalic and atraumatic.   Neck: No JVD. No bruits. Supple. Does not appear to be enlarged.   Cardiovascular: + S1,S2 ; RRR Soft systolic murmur at the left lower sternal border. No rubs noted.    Lungs: decreased BS BL  Abdomen: + BS, soft. Non tender. Non distended. No rebound. No guarding.   Extremities: No clubbing/cyanosis/edema.   Neurologic: unable to assess  Skin: Warm and moist. The patient's skin has normal elasticity and good skin turgor.   Psychiatric: unable to assess   Musculoskeletal: unable to assess    DATA  < from: Transthoracic Echocardiogram (07.07.21 @ 18:17) >  ------------------------------------------------------------------------  CONCLUSIONS:  1. Mitral annular calcification, otherwise normal mitral  valve. Minimal mitral regurgitation.  2. Endocardium not well visualized; grossly normal left  ventricular systolic function.  3. Right ventricular enlargement with decreased right  ventricular systolic function.  4. Inadequate tricuspid regurgitation Doppler envelope  precludes estimation of RVSP.  ------------------------------------------------------------------------  Confirmed on  7/7/2021 - 21:16:20 by Osvaldo Bertrand M.D.,  [facility]    < end of copied text >    Tele: SR, 3 beats NSVT     A/P) 69 y/o male PMH CAD s/p CABG and old PCI, AFL x 1 year on coumadin, HTN, hyperlipidemia, DM, ESRD on HD, and hypothyroidism originally admitted to [facility] with GIB, transferred to [facility], found to have septic shock and MRSA bacteremia.     -Events noted; pt. s/p PEA arrest   -Repeat TTE with only mild segmental LV dysfunction in the inferoseptum  -Given negative CPK, do not suspect acs  -Suspect elevated troponin in the setting of demand ischemia from underlying septic shock  -Pressor support per MICU  -EP follow up appreciated   -Given MRSA bacteremia, TRUMAN performed demonstrating MV endocarditis - discussed with CTS Dr. Pierce - pt. not a surgical candidate at this time   -Abx per ID  -Monitor BCx   -CTH head noted - pt. with cva - ac was restarted but now held due to dropping h/h   -Right thigh hematoma noted on CT scan - appreciate vascular evaluation - no surgical intervention required by vascular at this time  -Supportive care per MICU  -GOC discussions with family        Kalie Lau MD

## 2021-07-23 NOTE — PROGRESS NOTE ADULT - SUBJECTIVE AND OBJECTIVE BOX
Mainor Bowers  PGY-1 | Internal Medicine      INTERVAL HPI/OVERNIGHT EVENTS:    SUBJECTIVE: Patient seen and examined at bedside.     CONSTITUTIONAL: No weakness, fevers or chills  EYES/ENT: No visual changes;  No vertigo or throat pain   NECK: No pain or stiffness  RESPIRATORY: No cough, wheezing, hemoptysis; No shortness of breath  CARDIOVASCULAR: No chest pain or palpitations  GASTROINTESTINAL: No abdominal or epigastric pain. No nausea, vomiting, or hematemesis; No diarrhea or constipation. No melena or hematochezia.  GENITOURINARY: No dysuria, frequency or hematuria  NEUROLOGICAL: No numbness or weakness  SKIN: No itching, rashes    OBJECTIVE:    VITAL SIGNS:  ICU Vital Signs Last 24 Hrs  T(C): 37.1 (23 Jul 2021 08:00), Max: 37.9 (22 Jul 2021 17:05)  T(F): 98.7 (23 Jul 2021 08:00), Max: 100.2 (22 Jul 2021 17:05)  HR: 92 (23 Jul 2021 10:00) (59 - 92)  BP: 121/48 (23 Jul 2021 10:00) (84/68 - 157/51)  BP(mean): 60 (23 Jul 2021 10:00) (46 - 114)  ABP: --  ABP(mean): --  RR: 31 (23 Jul 2021 10:00) (9 - 31)  SpO2: 98% (23 Jul 2021 10:00) (98% - 100%)    Mode: AC/ CMV (Assist Control/ Continuous Mandatory Ventilation), RR (machine): 14, TV (machine): 400, FiO2: 40, PEEP: 5, ITime: 0.71, MAP: 9, PIP: 23    07-22 @ 07:01 - 07-23 @ 07:00  --------------------------------------------------------  IN: 1004.9 mL / OUT: 2500 mL / NET: -1495.1 mL    07-23 @ 07:01 - 07-23 @ 10:48  --------------------------------------------------------  IN: 28.2 mL / OUT: 0 mL / NET: 28.2 mL      CAPILLARY BLOOD GLUCOSE      POCT Blood Glucose.: 163 mg/dL (23 Jul 2021 04:59)      PHYSICAL EXAM:    General: NAD  HEENT: NC/AT; PERRL, clear conjunctiva  Neck: supple  Respiratory: CTA b/l  Cardiovascular: +S1/S2; RRR  Abdomen: soft, NT/ND; +BS x4  Extremities: WWP, 2+ peripheral pulses b/l; no LE edema  Skin: normal color and turgor; no rash  Neurological:    MEDICATIONS:  MEDICATIONS  (STANDING):  atorvastatin 40 milliGRAM(s) Oral at bedtime  chlorhexidine 0.12% Liquid 15 milliLiter(s) Oral Mucosa every 12 hours  chlorhexidine 2% Cloths 1 Application(s) Topical daily  dextrose 40% Gel 15 Gram(s) Oral once  dextrose 40% Gel 15 Gram(s) Oral once  dextrose 5%. 1000 milliLiter(s) (50 mL/Hr) IV Continuous <Continuous>  dextrose 5%. 1000 milliLiter(s) (100 mL/Hr) IV Continuous <Continuous>  dextrose 50% Injectable 25 Gram(s) IV Push once  dextrose 50% Injectable 12.5 Gram(s) IV Push once  dextrose 50% Injectable 25 Gram(s) IV Push once  diltiazem    Tablet 30 milliGRAM(s) Oral four times a day  epoetin danilo-epbx (RETACRIT) Injectable 43378 Unit(s) IV Push <User Schedule>  glucagon  Injectable 1 milliGRAM(s) IntraMuscular once  insulin lispro (ADMELOG) corrective regimen sliding scale   SubCutaneous every 6 hours  midodrine 10 milliGRAM(s) Oral every 8 hours  norepinephrine Infusion 0.02 MICROgram(s)/kG/Min (1.62 mL/Hr) IV Continuous <Continuous>  pantoprazole  Injectable 40 milliGRAM(s) IV Push daily    MEDICATIONS  (PRN):  acetaminophen   Tablet .. 1000 milliGRAM(s) Oral every 6 hours PRN Temp greater or equal to 38C (100.4F), Mild Pain (1 - 3), Moderate Pain (4 - 6)  calamine/zinc oxide Lotion 1 Application(s) Topical three times a day PRN Itching      ALLERGIES:  Allergies    lisinopril (Other)  opioid-like analgesics (Other)    Intolerances        LABS:                        7.9    22.20 )-----------( 398      ( 23 Jul 2021 01:31 )             26.0     07-23    134<L>  |  93<L>  |  27<H>  ----------------------------<  127<H>  4.2   |  22  |  4.11<H>    Ca    8.7      23 Jul 2021 01:31  Phos  4.5     07-23  Mg     2.00     07-23    TPro  5.8<L>  /  Alb  2.6<L>  /  TBili  1.1  /  DBili  x   /  AST  42<H>  /  ALT  9   /  AlkPhos  141<H>  07-23    PT/INR - ( 21 Jul 2021 16:27 )   PT: 15.4 sec;   INR: 1.36 ratio         PTT - ( 22 Jul 2021 02:39 )  PTT:35.6 sec      RADIOLOGY & ADDITIONAL TESTS: Reviewed. Mainor Bowers  PGY-1 | Internal Medicine      INTERVAL HPI/OVERNIGHT EVENTS: No acute events O/N.    SUBJECTIVE: Patient seen and examined at bedside. Patient is intubated.    ROS: Unable to assess    OBJECTIVE:    VITAL SIGNS:  ICU Vital Signs Last 24 Hrs  T(C): 37.1 (23 Jul 2021 08:00), Max: 37.9 (22 Jul 2021 17:05)  T(F): 98.7 (23 Jul 2021 08:00), Max: 100.2 (22 Jul 2021 17:05)  HR: 92 (23 Jul 2021 10:00) (59 - 92)  BP: 121/48 (23 Jul 2021 10:00) (84/68 - 157/51)  BP(mean): 60 (23 Jul 2021 10:00) (46 - 114)  RR: 31 (23 Jul 2021 10:00) (9 - 31)  SpO2: 98% (23 Jul 2021 10:00) (98% - 100%)    Mode: AC/ CMV (Assist Control/ Continuous Mandatory Ventilation), RR (machine): 14, TV (machine): 400, FiO2: 40, PEEP: 5, ITime: 0.71, MAP: 9, PIP: 23    07-22 @ 07:01 - 07-23 @ 07:00  --------------------------------------------------------  IN: 1004.9 mL / OUT: 2500 mL / NET: -1495.1 mL    07-23 @ 07:01 - 07-23 @ 10:48  --------------------------------------------------------  IN: 28.2 mL / OUT: 0 mL / NET: 28.2 mL      CAPILLARY BLOOD GLUCOSE      POCT Blood Glucose.: 163 mg/dL (23 Jul 2021 04:59)      PHYSICAL EXAM:    General: NAD. Patient is intubated  HEENT: NC/AT; PERRL, clear conjunctiva  Neck: supple  Respiratory: CTA b/l  Cardiovascular: +S1/S2; RRR  Abdomen: soft, NT/ND; +BS x4  : improving scrotal swelling  Extremities: WWP, 2+ peripheral pulses b/l; no LE edema. DP pulses not present on palpation. It can be heard on doppler. Right thigh tight and tender. Unclear if worsen with movement as patient is not able to communicate. No pallor or paralysis  Skin: normal color and turgor; no rash  Neurological: Less awake today, not alert or oriented    MEDICATIONS:  MEDICATIONS  (STANDING):  atorvastatin 40 milliGRAM(s) Oral at bedtime  chlorhexidine 0.12% Liquid 15 milliLiter(s) Oral Mucosa every 12 hours  chlorhexidine 2% Cloths 1 Application(s) Topical daily  dextrose 40% Gel 15 Gram(s) Oral once  dextrose 40% Gel 15 Gram(s) Oral once  dextrose 5%. 1000 milliLiter(s) (50 mL/Hr) IV Continuous <Continuous>  dextrose 5%. 1000 milliLiter(s) (100 mL/Hr) IV Continuous <Continuous>  dextrose 50% Injectable 25 Gram(s) IV Push once  dextrose 50% Injectable 12.5 Gram(s) IV Push once  dextrose 50% Injectable 25 Gram(s) IV Push once  diltiazem    Tablet 30 milliGRAM(s) Oral four times a day  epoetin danilo-epbx (RETACRIT) Injectable 20481 Unit(s) IV Push <User Schedule>  glucagon  Injectable 1 milliGRAM(s) IntraMuscular once  insulin lispro (ADMELOG) corrective regimen sliding scale   SubCutaneous every 6 hours  midodrine 10 milliGRAM(s) Oral every 8 hours  norepinephrine Infusion 0.02 MICROgram(s)/kG/Min (1.62 mL/Hr) IV Continuous <Continuous>  pantoprazole  Injectable 40 milliGRAM(s) IV Push daily    MEDICATIONS  (PRN):  acetaminophen   Tablet .. 1000 milliGRAM(s) Oral every 6 hours PRN Temp greater or equal to 38C (100.4F), Mild Pain (1 - 3), Moderate Pain (4 - 6)  calamine/zinc oxide Lotion 1 Application(s) Topical three times a day PRN Itching      ALLERGIES:  Allergies    lisinopril (Other)  opioid-like analgesics (Other)    Intolerances        LABS:                        7.9    22.20 )-----------( 398      ( 23 Jul 2021 01:31 )             26.0     07-23    134<L>  |  93<L>  |  27<H>  ----------------------------<  127<H>  4.2   |  22  |  4.11<H>    Ca    8.7      23 Jul 2021 01:31  Phos  4.5     07-23  Mg     2.00     07-23    TPro  5.8<L>  /  Alb  2.6<L>  /  TBili  1.1  /  DBili  x   /  AST  42<H>  /  ALT  9   /  AlkPhos  141<H>  07-23    PT/INR - ( 21 Jul 2021 16:27 )   PT: 15.4 sec;   INR: 1.36 ratio         PTT - ( 22 Jul 2021 02:39 )  PTT:35.6 sec      RADIOLOGY & ADDITIONAL TESTS: Reviewed. Mainor Bowers  PGY-1 | Internal Medicine      INTERVAL HPI/OVERNIGHT EVENTS: No acute events O/N.    SUBJECTIVE: Patient seen and examined at bedside. Patient is intubated.    ROS: Unable to assess    OBJECTIVE:    VITAL SIGNS:  ICU Vital Signs Last 24 Hrs  T(C): 37.1 (23 Jul 2021 08:00), Max: 37.9 (22 Jul 2021 17:05)  T(F): 98.7 (23 Jul 2021 08:00), Max: 100.2 (22 Jul 2021 17:05)  HR: 92 (23 Jul 2021 10:00) (59 - 92)  BP: 121/48 (23 Jul 2021 10:00) (84/68 - 157/51)  BP(mean): 60 (23 Jul 2021 10:00) (46 - 114)  RR: 31 (23 Jul 2021 10:00) (9 - 31)  SpO2: 98% (23 Jul 2021 10:00) (98% - 100%)    Mode: AC/ CMV (Assist Control/ Continuous Mandatory Ventilation), RR (machine): 14, TV (machine): 400, FiO2: 40, PEEP: 5, ITime: 0.71, MAP: 9, PIP: 23    07-22 @ 07:01 - 07-23 @ 07:00  --------------------------------------------------------  IN: 1004.9 mL / OUT: 2500 mL / NET: -1495.1 mL    07-23 @ 07:01 - 07-23 @ 10:48  --------------------------------------------------------  IN: 28.2 mL / OUT: 0 mL / NET: 28.2 mL      CAPILLARY BLOOD GLUCOSE      POCT Blood Glucose.: 163 mg/dL (23 Jul 2021 04:59)      PHYSICAL EXAM:    General: NAD. Patient is intubated  HEENT: NC/AT; PERRL, clear conjunctiva  Neck: supple  Respiratory: CTA b/l  Cardiovascular: +S1/S2; RRR  Abdomen: soft, NT/ND; +BS x4  : improving scrotal swelling  Extremities: WWP, 2+ peripheral pulses b/l; no LE edema. DP pulses not present on palpation. It can be heard on doppler. Right thigh tight and tender. Unclear if worsen with movement as patient is not able to communicate. No pallor or paralysis  Skin: normal color and turgor; no rash  Neurological: patient awake and alert    MEDICATIONS:  MEDICATIONS  (STANDING):  atorvastatin 40 milliGRAM(s) Oral at bedtime  chlorhexidine 0.12% Liquid 15 milliLiter(s) Oral Mucosa every 12 hours  chlorhexidine 2% Cloths 1 Application(s) Topical daily  dextrose 40% Gel 15 Gram(s) Oral once  dextrose 40% Gel 15 Gram(s) Oral once  dextrose 5%. 1000 milliLiter(s) (50 mL/Hr) IV Continuous <Continuous>  dextrose 5%. 1000 milliLiter(s) (100 mL/Hr) IV Continuous <Continuous>  dextrose 50% Injectable 25 Gram(s) IV Push once  dextrose 50% Injectable 12.5 Gram(s) IV Push once  dextrose 50% Injectable 25 Gram(s) IV Push once  diltiazem    Tablet 30 milliGRAM(s) Oral four times a day  epoetin danilo-epbx (RETACRIT) Injectable 93230 Unit(s) IV Push <User Schedule>  glucagon  Injectable 1 milliGRAM(s) IntraMuscular once  insulin lispro (ADMELOG) corrective regimen sliding scale   SubCutaneous every 6 hours  midodrine 10 milliGRAM(s) Oral every 8 hours  norepinephrine Infusion 0.02 MICROgram(s)/kG/Min (1.62 mL/Hr) IV Continuous <Continuous>  pantoprazole  Injectable 40 milliGRAM(s) IV Push daily    MEDICATIONS  (PRN):  acetaminophen   Tablet .. 1000 milliGRAM(s) Oral every 6 hours PRN Temp greater or equal to 38C (100.4F), Mild Pain (1 - 3), Moderate Pain (4 - 6)  calamine/zinc oxide Lotion 1 Application(s) Topical three times a day PRN Itching      ALLERGIES:  Allergies    lisinopril (Other)  opioid-like analgesics (Other)    Intolerances        LABS:                        7.9    22.20 )-----------( 398      ( 23 Jul 2021 01:31 )             26.0     07-23    134<L>  |  93<L>  |  27<H>  ----------------------------<  127<H>  4.2   |  22  |  4.11<H>    Ca    8.7      23 Jul 2021 01:31  Phos  4.5     07-23  Mg     2.00     07-23    TPro  5.8<L>  /  Alb  2.6<L>  /  TBili  1.1  /  DBili  x   /  AST  42<H>  /  ALT  9   /  AlkPhos  141<H>  07-23    PT/INR - ( 21 Jul 2021 16:27 )   PT: 15.4 sec;   INR: 1.36 ratio         PTT - ( 22 Jul 2021 02:39 )  PTT:35.6 sec      RADIOLOGY & ADDITIONAL TESTS: Reviewed.

## 2021-07-24 LAB
ALBUMIN SERPL ELPH-MCNC: 2.2 G/DL — LOW (ref 3.3–5)
ALP SERPL-CCNC: 211 U/L — HIGH (ref 40–120)
ALT FLD-CCNC: 17 U/L — SIGNIFICANT CHANGE UP (ref 4–41)
ANION GAP SERPL CALC-SCNC: 17 MMOL/L — HIGH (ref 7–14)
APTT BLD: 37.9 SEC — HIGH (ref 27–36.3)
AST SERPL-CCNC: 78 U/L — HIGH (ref 4–40)
BASOPHILS # BLD AUTO: 0.04 K/UL — SIGNIFICANT CHANGE UP (ref 0–0.2)
BASOPHILS NFR BLD AUTO: 0.2 % — SIGNIFICANT CHANGE UP (ref 0–2)
BILIRUB SERPL-MCNC: 1.3 MG/DL — HIGH (ref 0.2–1.2)
BUN SERPL-MCNC: 40 MG/DL — HIGH (ref 7–23)
CALCIUM SERPL-MCNC: 9 MG/DL — SIGNIFICANT CHANGE UP (ref 8.4–10.5)
CHLORIDE SERPL-SCNC: 92 MMOL/L — LOW (ref 98–107)
CO2 SERPL-SCNC: 25 MMOL/L — SIGNIFICANT CHANGE UP (ref 22–31)
CREAT SERPL-MCNC: 5.33 MG/DL — HIGH (ref 0.5–1.3)
EOSINOPHIL # BLD AUTO: 0.05 K/UL — SIGNIFICANT CHANGE UP (ref 0–0.5)
EOSINOPHIL NFR BLD AUTO: 0.3 % — SIGNIFICANT CHANGE UP (ref 0–6)
GLUCOSE BLDC GLUCOMTR-MCNC: 126 MG/DL — HIGH (ref 70–99)
GLUCOSE BLDC GLUCOMTR-MCNC: 214 MG/DL — HIGH (ref 70–99)
GLUCOSE BLDC GLUCOMTR-MCNC: 276 MG/DL — HIGH (ref 70–99)
GLUCOSE SERPL-MCNC: 190 MG/DL — HIGH (ref 70–99)
HCT VFR BLD CALC: 26.5 % — LOW (ref 39–50)
HGB BLD-MCNC: 7.8 G/DL — LOW (ref 13–17)
IANC: 13.89 K/UL — HIGH (ref 1.5–8.5)
IMM GRANULOCYTES NFR BLD AUTO: 1.4 % — SIGNIFICANT CHANGE UP (ref 0–1.5)
LYMPHOCYTES # BLD AUTO: 0.69 K/UL — LOW (ref 1–3.3)
LYMPHOCYTES # BLD AUTO: 4.2 % — LOW (ref 13–44)
MAGNESIUM SERPL-MCNC: 2.1 MG/DL — SIGNIFICANT CHANGE UP (ref 1.6–2.6)
MCHC RBC-ENTMCNC: 27.6 PG — SIGNIFICANT CHANGE UP (ref 27–34)
MCHC RBC-ENTMCNC: 29.4 GM/DL — LOW (ref 32–36)
MCV RBC AUTO: 93.6 FL — SIGNIFICANT CHANGE UP (ref 80–100)
MONOCYTES # BLD AUTO: 1.44 K/UL — HIGH (ref 0–0.9)
MONOCYTES NFR BLD AUTO: 8.8 % — SIGNIFICANT CHANGE UP (ref 2–14)
NEUTROPHILS # BLD AUTO: 13.89 K/UL — HIGH (ref 1.8–7.4)
NEUTROPHILS NFR BLD AUTO: 85.1 % — HIGH (ref 43–77)
NRBC # BLD: 0 /100 WBCS — SIGNIFICANT CHANGE UP
NRBC # FLD: 0.05 K/UL — HIGH
PHOSPHATE SERPL-MCNC: 6 MG/DL — HIGH (ref 2.5–4.5)
PLATELET # BLD AUTO: 262 K/UL — SIGNIFICANT CHANGE UP (ref 150–400)
POTASSIUM SERPL-MCNC: 4.9 MMOL/L — SIGNIFICANT CHANGE UP (ref 3.5–5.3)
POTASSIUM SERPL-SCNC: 4.9 MMOL/L — SIGNIFICANT CHANGE UP (ref 3.5–5.3)
PROT SERPL-MCNC: 5.8 G/DL — LOW (ref 6–8.3)
RBC # BLD: 2.83 M/UL — LOW (ref 4.2–5.8)
RBC # FLD: 20.2 % — HIGH (ref 10.3–14.5)
SODIUM SERPL-SCNC: 134 MMOL/L — LOW (ref 135–145)
VANCOMYCIN FLD-MCNC: 19.2 UG/ML — SIGNIFICANT CHANGE UP
WBC # BLD: 16.34 K/UL — HIGH (ref 3.8–10.5)
WBC # FLD AUTO: 16.34 K/UL — HIGH (ref 3.8–10.5)

## 2021-07-24 PROCEDURE — 99233 SBSQ HOSP IP/OBS HIGH 50: CPT | Mod: GC

## 2021-07-24 PROCEDURE — 99291 CRITICAL CARE FIRST HOUR: CPT

## 2021-07-24 RX ORDER — PANTOPRAZOLE SODIUM 20 MG/1
40 TABLET, DELAYED RELEASE ORAL
Refills: 0 | Status: DISCONTINUED | OUTPATIENT
Start: 2021-07-24 | End: 2021-07-30

## 2021-07-24 RX ORDER — CEFEPIME 1 G/1
1000 INJECTION, POWDER, FOR SOLUTION INTRAMUSCULAR; INTRAVENOUS DAILY
Refills: 0 | Status: DISCONTINUED | OUTPATIENT
Start: 2021-07-24 | End: 2021-08-01

## 2021-07-24 RX ORDER — MIDODRINE HYDROCHLORIDE 2.5 MG/1
20 TABLET ORAL EVERY 8 HOURS
Refills: 0 | Status: DISCONTINUED | OUTPATIENT
Start: 2021-07-24 | End: 2021-07-28

## 2021-07-24 RX ADMIN — Medication 250 MILLIGRAM(S): at 18:06

## 2021-07-24 RX ADMIN — ERYTHROPOIETIN 20000 UNIT(S): 10000 INJECTION, SOLUTION INTRAVENOUS; SUBCUTANEOUS at 07:25

## 2021-07-24 RX ADMIN — CEFEPIME 100 MILLIGRAM(S): 1 INJECTION, POWDER, FOR SOLUTION INTRAMUSCULAR; INTRAVENOUS at 14:13

## 2021-07-24 RX ADMIN — Medication 30 MILLIGRAM(S): at 23:58

## 2021-07-24 RX ADMIN — MIDODRINE HYDROCHLORIDE 20 MILLIGRAM(S): 2.5 TABLET ORAL at 21:07

## 2021-07-24 RX ADMIN — ATORVASTATIN CALCIUM 40 MILLIGRAM(S): 80 TABLET, FILM COATED ORAL at 21:07

## 2021-07-24 RX ADMIN — Medication 3: at 18:02

## 2021-07-24 RX ADMIN — MIDODRINE HYDROCHLORIDE 10 MILLIGRAM(S): 2.5 TABLET ORAL at 05:09

## 2021-07-24 RX ADMIN — CHLORHEXIDINE GLUCONATE 1 APPLICATION(S): 213 SOLUTION TOPICAL at 11:48

## 2021-07-24 RX ADMIN — Medication 8.08 MICROGRAM(S)/KG/MIN: at 21:06

## 2021-07-24 RX ADMIN — MIDODRINE HYDROCHLORIDE 20 MILLIGRAM(S): 2.5 TABLET ORAL at 14:02

## 2021-07-24 RX ADMIN — PANTOPRAZOLE SODIUM 40 MILLIGRAM(S): 20 TABLET, DELAYED RELEASE ORAL at 11:46

## 2021-07-24 RX ADMIN — Medication 30 MILLIGRAM(S): at 11:46

## 2021-07-24 RX ADMIN — Medication 30 MILLIGRAM(S): at 05:08

## 2021-07-24 RX ADMIN — Medication 8.08 MICROGRAM(S)/KG/MIN: at 07:38

## 2021-07-24 RX ADMIN — Medication 30 MILLIGRAM(S): at 17:46

## 2021-07-24 RX ADMIN — Medication 2: at 06:24

## 2021-07-24 RX ADMIN — PANTOPRAZOLE SODIUM 40 MILLIGRAM(S): 20 TABLET, DELAYED RELEASE ORAL at 17:47

## 2021-07-24 RX ADMIN — PIPERACILLIN AND TAZOBACTAM 25 GRAM(S): 4; .5 INJECTION, POWDER, LYOPHILIZED, FOR SOLUTION INTRAVENOUS at 05:07

## 2021-07-24 NOTE — PROGRESS NOTE ADULT - SUBJECTIVE AND OBJECTIVE BOX
Date of service 7/24/21    CARDIOLOGY     tele - ST    S: extubated, being fed breakfast, denies pain but minimally conversive     Review of Systems:   Constitutional: [ ] fevers, [ ] chills.   Skin: [ ] dry skin. [ ] rashes.  Psychiatric: [ ] depression, [ ] anxiety.   Gastrointestinal: [ ] BRBPR, [ ] melena.   Neurological: [ ] confusion. [ ] seizures. [ ] shuffling gait.   Ears,Nose,Mouth and Throat: [ ] ear pain [ ] sore throat.   Eyes: [ ] diplopia.   Respiratory: [ ] hemoptysis. [ ] shortness of breath  Cardiovascular: See HPI above  Hematologic/Lymphatic: [ ] anemia. [ ] painful nodes. [ ] prolonged bleeding.   Genitourinary: [ ] hematuria. [ ] flank pain.   Endocrine: [ ] significant change in weight. [ ] intolerance to heat and cold.     Review of systems [ ] otherwise negative, [ x] otherwise unable to obtain    FH: no family history of sudden cardiac death in first degree relatives    SH: [ ] tobacco, [ ] alcohol, [ ] drugs    acetaminophen   Tablet .. 1000 milliGRAM(s) Oral every 6 hours PRN  atorvastatin 40 milliGRAM(s) Oral at bedtime  calamine/zinc oxide Lotion 1 Application(s) Topical three times a day PRN  cefepime   IVPB 1000 milliGRAM(s) IV Intermittent daily  chlorhexidine 2% Cloths 1 Application(s) Topical daily  dextrose 40% Gel 15 Gram(s) Oral once  dextrose 40% Gel 15 Gram(s) Oral once  dextrose 5%. 1000 milliLiter(s) IV Continuous <Continuous>  dextrose 5%. 1000 milliLiter(s) IV Continuous <Continuous>  dextrose 50% Injectable 25 Gram(s) IV Push once  dextrose 50% Injectable 12.5 Gram(s) IV Push once  dextrose 50% Injectable 25 Gram(s) IV Push once  diltiazem    Tablet 30 milliGRAM(s) Oral four times a day  epoetin danilo-epbx (RETACRIT) Injectable 06574 Unit(s) IV Push <User Schedule>  glucagon  Injectable 1 milliGRAM(s) IntraMuscular once  insulin lispro (ADMELOG) corrective regimen sliding scale   SubCutaneous every 6 hours  midodrine 20 milliGRAM(s) Oral every 8 hours  norepinephrine Infusion 0.05 MICROgram(s)/kG/Min IV Continuous <Continuous>  pantoprazole    Tablet 40 milliGRAM(s) Oral two times a day  vancomycin  IVPB 750 milliGRAM(s) IV Intermittent <User Schedule>                            7.8    16.34 )-----------( 262      ( 24 Jul 2021 03:25 )             26.5       07-24    134<L>  |  92<L>  |  40<H>  ----------------------------<  190<H>  4.9   |  25  |  5.33<H>    Ca    9.0      24 Jul 2021 03:25  Phos  6.0     07-24  Mg     2.10     07-24    TPro  5.8<L>  /  Alb  2.2<L>  /  TBili  1.3<H>  /  DBili  x   /  AST  78<H>  /  ALT  17  /  AlkPhos  211<H>  07-24      CARDIAC MARKERS ( 23 Jul 2021 01:31 )  x     / x     / 599 U/L / x     / x      CARDIAC MARKERS ( 22 Jul 2021 02:39 )  x     / x     / 610 U/L / x     / x      CARDIAC MARKERS ( 21 Jul 2021 18:08 )  x     / x     / 633 U/L / x     / x            T(C): 37.2 (07-24-21 @ 12:00), Max: 37.7 (07-23-21 @ 16:00)  HR: 108 (07-24-21 @ 12:31) (92 - 116)  BP: 122/40 (07-24-21 @ 12:31) (77/51 - 183/80)  RR: 30 (07-24-21 @ 12:31) (20 - 35)  SpO2: 97% (07-24-21 @ 12:31) (53% - 100%)  Wt(kg): --    I&O's Summary    23 Jul 2021 07:01  -  24 Jul 2021 07:00  --------------------------------------------------------  IN: 247.2 mL / OUT: 0 mL / NET: 247.2 mL    24 Jul 2021 07:01 - 24 Jul 2021 14:24  --------------------------------------------------------  IN: 1076.8 mL / OUT: 2700 mL / NET: -1623.2 mL    General: extubated lying in bed, appears comfortable  Head: Normocephalic and atraumatic.   Neck: No JVD. No bruits. Supple. Does not appear to be enlarged.   Cardiovascular: + S1,S2 ; RRR Soft systolic murmur at the left lower sternal border. No rubs noted.    Lungs: decreased BS BL  Abdomen: + BS, soft. Non tender. Non distended. No rebound. No guarding.   Extremities: No clubbing/cyanosis/edema.   Neurologic: unable to assess  Skin: Warm and moist. The patient's skin has normal elasticity and good skin turgor.   Psychiatric: unable to assess   Musculoskeletal: unable to assess    DATA  < from: Transthoracic Echocardiogram (07.07.21 @ 18:17) >  ------------------------------------------------------------------------  CONCLUSIONS:  1. Mitral annular calcification, otherwise normal mitral  valve. Minimal mitral regurgitation.  2. Endocardium not well visualized; grossly normal left  ventricular systolic function.  3. Right ventricular enlargement with decreased right  ventricular systolic function.  4. Inadequate tricuspid regurgitation Doppler envelope  precludes estimation of RVSP.  ------------------------------------------------------------------------  Confirmed on  7/7/2021 - 21:16:20 by Osvaldo Bertrand M.D.,  [facility], JOHN    < end of copied text >      A/P) 71 y/o male PMH CAD s/p CABG and old PCI, AFL x 1 year on coumadin, HTN, hyperlipidemia, DM, ESRD on HD, and hypothyroidism originally admitted to [facility] with GIB, transferred to [facility], found to have septic shock and MRSA bacteremia, trx to MICU s/p PEA arrest, intubated and now extubated    -weaning pressors per MICU  -Given MRSA bacteremia, TRUMAN performed demonstrating MV endocarditis - discussed with CTS Dr. Pierce - pt. not a surgical candidate at this time   -Abx per ID, blood cx now + for serratia so zosyn added  -CTH head noted - pt. with cva - ac was restarted but now held due to dropping h/h   -Right thigh hematoma noted on CT scan - appreciate vascular evaluation - no surgical intervention required by vascular at this time  -Supportive care per MICU  -GOC discussions with family scheduled for 7/26

## 2021-07-24 NOTE — PROGRESS NOTE ADULT - ASSESSMENT
Assessment and Plan: 	  69 yo M with PMhx of CAD s/p CABG (course at that time complicated by pleural effusion requiring chest tubes), ESRD on HD (MWF), DMII, HTN, afib on coumadin, and anemia transferred from [facility] for evaluation of chest pain after HD and GIB s/p 2U PRBC transfusion and aflutter/afib with RVR managed with metoprolol. Admitted to MICU for hypotension dependent on pressors likely 2/2 to sepsis. In MICU, patient had a cardiac arrest on 7/13 requiring CPR, 3x epi, and 2x shock (for VTach). Course now complicated by recent drop in hgb requiring pRBCs. CT of leg shows hematoma on right adductor.    #Neuro  - Off of sedation. Patient opens his eyes on verbal command and alert  - CT head shows foci of low density in the R anterolateral frontal lobe and L posterior temporal lobe concerning for acute infarcts.  - Mechanism likely cardioembolic in setting of patient with known atrial fibrillation with cardiac arrest vs septic emboli  - per neurology, can consider MRI brain w/o contrast. However, unlikely to  since patient has known atrial fibrillation and will need to be continued of full dose anticoagulation. Currently holding given recent bleeding  - Avoid hypotension, keep -180 for neuroprotection  - c/w Atorvastatin 40MG QHS for secondary stroke prevention.    #Cardiovascular  - Cardiac arrest: Patient had asystole night of 7/13 and CPR was started. S/p epi x3 and shock x2 with return to NSR. Cards is following  - TTE on 7/13 showed Mild mitral regurgitation with biventricular dysfunction of LV and RV systolic function. Endocardium was not well visualized, Hypokinesis of the basal inferior wall and basal inferoseptum.  - TRUMAN on 7/19 showed a large echodensity consisted with endocarditis and PFO  - Cardiology spoke with CTS, no surgery at this time. (Dr. Herzog)  - c/w Levophed wean as tolerated  - Will start midodrine as HR stable to help assist weaning off of pressors   - BP goal is >100 systolic  - Troponins 3200 today. They are likely 2/2 chronic condition/ESRD on HD vs Hypotensive event. Stable, no need to trend    A. Fib  - on coumadin at home,  - holding heparin in setting of recent drop in hgb  - From neurology stand point, heparin is sufficient for now. Will need to be switched to DOAC in the future if kidney function allows  - 7/11: tried to transition heparin gtt to eliquis but pt is not a candidate due to his acute MIGUEL; back on heparin gtt  - c/ diltiazem for rate control    #Respiratory  - Loculated Pleural effusions: chest tube removed  - pleural fluid cx: (-); transudative.  - intubated and on ventilator (24/400/5/40). Tolerating CPAP well. Will extubate today when family is nearby  - CXR s/p chest tube removal showed no evidence of atelectasis. Demonstrated loculated left pleural effusion decreased in size and new hazy right lower lung opacity which could be due to a small layering right pleural effusion with associated passive atelectasis, atelectasis of other cause, and/or pneumonia.  - abx as below    #GI/Nutrition:  - NPO with tube feed. Holding at this time in preparation for extubation  - Was c/o abdominal pain on presentation, bedside POCUS didn't show ascites. No abdominal pain at this time. Continue to monitor.   - negative occult bleeding, hgb stable  - Per GI, recent decrease in hgb less likely to be a GIB, appreciate recs  - FOBT negative  - Zofran PRN for Nausea.    #/Renal  - ESRD: HD on MWF. Most recent Scr 4.6. Potassium is stable.   - Last dialysis session 7/23. Will likely not receive dialysis today.   - Recommend removing 2-3L of fluids as patient is net positive for I&Os. Discussed with nephro  - Electrolytes stable  - c/w Epo 20k tiw with HD  - Scotal US demonstrates swelling in the scrotum, improving with dialysis yesterday. likely in setting of fluid overload. Manage as above      #Skin  - HD Access: fistula in left upper arm  - Improving erythematous and tender on right wrist. Continue to monitor. RUE US neg for thrombus  - US of fistula showed patent fistula with no abscess  - Arrow is patent in right upper extremity.  - Can remove the central line and add a peripheral    #ID  - S aureus PCR: (+),  MRSA PCR negative  - BCx on 7/7 now growing MRSA: restarted on vanc (7/11- )  - Vanc 7/23 was 24, dose held, plan for after dialysis saturday 7/24  - Blood Cx 7/22 growing gram negative rods, Serratia on Zosyn (7/24 - )    #Endocrine  - DMII: c/w Lispro SS. Monitor glucose.      #Hematologic/DVT ppx  - monitor PTT and cbc q12  - Will hold heparin in setting of recent decrease in hgb. Required another unit of blood o/n  - Patient complaining of right thigh pain. CTA demonstrated A moderate-sized hematoma in the right adductor muscle group.  - Contact surgery regarding the hematoma, no surgery at this time. c/ with ACE wrap at this time.  - PRN dilaudid for pain control  - Leukocytosis as above    #Ethics  - full code as per wife- full code as per wife Assessment and Plan: 	  69 yo M with PMhx of CAD s/p CABG (course at that time complicated by pleural effusion requiring chest tubes), ESRD on HD (MWF), DMII, HTN, afib on coumadin, and anemia transferred from [facility] for evaluation of chest pain after HD and GIB s/p 2U PRBC transfusion and aflutter/afib with RVR managed with metoprolol. Admitted to MICU for hypotension dependent on pressors likely 2/2 to sepsis. In MICU, patient had a cardiac arrest on 7/13 requiring CPR, 3x epi, and 2x shock (for VTach). Course now complicated by recent drop in hgb requiring pRBCs. CT of leg shows hematoma on right adductor.    #Neuro  - Off of sedation. Patient opens his eyes on verbal command and alert  - CT head shows foci of low density in the R anterolateral frontal lobe and L posterior temporal lobe concerning for acute infarcts.  - Mechanism likely cardioembolic in setting of patient with known atrial fibrillation with cardiac arrest vs septic emboli  - per neurology, can consider MRI brain w/o contrast. However, unlikely to  since patient has known atrial fibrillation and will need to be continued of full dose anticoagulation. Currently holding given recent bleeding  - Avoid hypotension, keep -180 for neuroprotection  - c/w Atorvastatin 40MG QHS for secondary stroke prevention.    #Cardiovascular  - Cardiac arrest: Patient had asystole night of 7/13 and CPR was started. S/p epi x3 and shock x2 with return to NSR. Cards is following  - TTE on 7/13 showed Mild mitral regurgitation with biventricular dysfunction of LV and RV systolic function. Endocardium was not well visualized, Hypokinesis of the basal inferior wall and basal inferoseptum.  - TRUMAN on 7/19 showed a large echodensity consisted with endocarditis and PFO  - Cardiology spoke with CTS, no surgery at this time. (Dr. Herzog)  - c/w Levophed wean as tolerated  - Will start midodrine as HR stable to help assist weaning off of pressors   - BP goal is >100 systolic  - troponinemia likely secondary to demand ischemia and ESRD     A. Fib  - on coumadin at home,  - holding heparin in setting of recent drop in hgb  - From neurology stand point, heparin is sufficient for now. Will need to be switched to DOAC in the future if kidney function allows  - 7/11: tried to transition heparin gtt to eliquis but pt is not a candidate due to his acute MIGUEL; back on heparin gtt  - c/ diltiazem for rate control    #Respiratory  - Loculated Pleural effusions: chest tube removed  - pleural fluid cx: (-); transudative.  - intubated and on ventilator (24/400/5/40). Tolerating CPAP well. Will extubate today when family is nearby  - CXR s/p chest tube removal showed no evidence of atelectasis. Demonstrated loculated left pleural effusion decreased in size and new hazy right lower lung opacity which could be due to a small layering right pleural effusion with associated passive atelectasis, atelectasis of other cause, and/or pneumonia.  - abx as below    #GI/Nutrition:  - NPO with tube feed. Holding at this time in preparation for extubation  - Was c/o abdominal pain on presentation, bedside POCUS didn't show ascites. No abdominal pain at this time. Continue to monitor.   - negative occult bleeding, hgb stable  - Per GI, recent decrease in hgb less likely to be a GIB, appreciate recs  - FOBT negative  - Zofran PRN for Nausea.    #/Renal  - ESRD: HD on MWF. Most recent Scr 4.6. Potassium is stable.   - Last dialysis session 7/23. Will likely not receive dialysis today.   - Recommend removing 2-3L of fluids as patient is net positive for I&Os. Discussed with nephro  - Electrolytes stable  - c/w Epo 20k tiw with HD  - Scotal US demonstrates swelling in the scrotum, improving with dialysis yesterday. likely in setting of fluid overload. Manage as above      #Skin  - HD Access: fistula in left upper arm  - Improving erythematous and tender on right wrist. Continue to monitor. RUE US neg for thrombus  - US of fistula showed patent fistula with no abscess  - Arrow is patent in right upper extremity.  - Can remove the central line and add a peripheral    #ID  - S aureus PCR: (+),  MRSA PCR negative  - BCx on 7/7 now growing MRSA: restarted on vanc (7/11- )  - Vanc 7/23 was 24, dose held, plan for after dialysis saturday 7/24  - Blood Cx 7/22 growing gram negative rods, Serratia on Zosyn (7/24 - )    #Endocrine  - DMII: c/w Lispro SS. Monitor glucose.      #Hematologic/DVT ppx  - monitor PTT and cbc q12  - Will hold heparin in setting of recent decrease in hgb. Required another unit of blood o/n  - Patient complaining of right thigh pain. CTA demonstrated A moderate-sized hematoma in the right adductor muscle group.  - Contact surgery regarding the hematoma, no surgery at this time. c/ with ACE wrap at this time.  - PRN dilaudid for pain control  - Leukocytosis as above    #Ethics  - full code as per wife- full code as per wife

## 2021-07-24 NOTE — PROGRESS NOTE ADULT - ASSESSMENT
69 yo M with PMhx of CAD s/p CABG (course at that time complicated by pleural effusion requiring chest tubes), ESRD on HD (MWF), DMII, HTN, afib on coumadin, and anemia presented from [facility] for evaluation of chest pain and GIB.       left sided pleural effusion: loculated and chronic:   CAD:  CABG:  ESRD:   DM:  HTN:  A fibrillation:   GI Bleed    7/7:    left sided pleural effusion: loculated and chronic: he has chr loculated pleural effusion on left side: He had chest tube placement in last June 2020 at another hospital: no chemistry is available but he had negative cytology at that time: This time the effusion seems slightly worse and has loculated effusion with pleural thickening: heis not SOB andhasbeen on roomair: I think , it at all, if this effusion needs to be dealt with : it is probably vats: will contact thoracic surgery : etiology not very clear as there is no previous chemistry: coult be exudative or transudatve: he is on HD and now it is a chr christiano effusion   CAD: cont per cards  CABG: cont current meds  ESRD: on HD   DM: monitor and control  HTN: controlled  A fibrillation: off ac:   GI Bleed: per gi :  DW pt and t [email or web address]    7/8:    left sided pleural effusion:: fever: s/p left sided chest tube placement: cultures sent: however with pr criteria it seems transudative await serum LDH but pleural fluid LDH is low:  already started on broad spectrum antibiotics ID to see: await cultures:   CAD: cont per cards  CABG: cont current meds  ESRD: on HD   DM: monitor and control  HTN: controlled  A fibrillation: off ac:   GI Bleed: per gi :  DW pmd    7/9:    left sided pleural effusion:: fever: s/p left sided chest tube placement: cultures sent: however with pr criteria it seems borderline transudative and transudative by LDH criteria: the vulutres ahve been negative so far:  already started on broad spectrum antibiotics ID to see: await cultures: ? source of spesis  CAD: cont per cards  CABG: cont current meds  ESRD: on HD   DM: monitor and control  HTN: in shock: now on vasopressors:   A fibrillation: off ac:   GI Bleed: per gi :  DW pmd  micu team    7/11:      left sided pleural effusion:: fever: s/p left sided chest tube placement: cultures sent: however with pr criteria it seems borderline transudative and transudative by LDH criteria: the cultures have been negative so far:  already started on broad spectrum antibiotics : Has MSSA bactermia  CAD: cont per cards: still on vasopressors: wean as tolerated ? needs molly ro tule out IE : would defer to cards:   CABG: cont current meds  ESRD: on HD   DM: monitor and control  HTN: in shock: now on vasopressors:   A fibrillation: off ac:   GI Bleed: per gi :  DW pmd  micu team    7/12:    left sided pleural effusion:: fever: s/p left sided chest tube placement:- now removed: cultures sent: however with pr criteria it seems borderline transudative and transudative by LDH criteria: the cultures have been negative so far:  already started on broad spectrum antibiotics : Has MRSA bactermia: vanco restarted yesterday ? echo/molly  Ac hypercarbic resp fialure: needs to be watched closelyin MICU : needs bipap may need intubation" At this time he is alert and awake: and responds to simple questions:    CAD: cont per cards: still on vasopressors: wean as tolerated ? needs molly ro tule out IE : would defer to cards:   CABG: cont current meds  ESRD: on HD   DM: monitor and control  HTN: in shock: now on vasopressors:   A fibrillation: off ac:   GI Bleed: per gi :  DW pmd  cards and bedstaff    7/13:  left sided pleural effusion:: fever: s/p left sided chest tube placement:- now removed: cultures sent: however with pr criteria it seems borderline transudative and transudative by LDH criteria: the cultures have been negative so far:  already started on broad spectrum antibiotics : Has MRSA bacteremia vanco restarted yesterday ? echo/molly  Ac hypercarbic resp fialure: now intubated s/p acls protocol last night   CAD: cont per cards: still on vasopressors: wean as tolerated ? needs molly ro tule out IE : would defer to cards:   CABG: cont current meds  ESRD: on HD   DM: monitor and control  HTN: in shock: now on vasopressors:   A fibrillation: off ac:   GI Bleed: per gi :  DELLA  staff  pt is critically ill now:     7/14:  left sided pleural effusion:: fever: s/p left sided chest tube placement:- now removed: cultures sent: however with pr criteria it seems borderline transudative and transudative by LDH criteria: the cultures have been negative so far:  already started on broad spectrum antibiotics : Has MRSA bacteremia vanco restarted yesterday now for  echo/molly  Ac hypercarbic resp fialure: now intubated s/p acls protocol last night -s/p cardiac arrest: ROSC 10 mts:   CAD: cont per cards: still on vasopressors: wean as tolerated ? needs molly ro tule out IE : would defer to cards: Cotn antbitioc  CABG: cont current meds  ESRD: on HD   DM: monitor and control  HTN: in shock: now on vasopressors:   A fibrillation: off ac:   GI Bleed: per gi :  DW  staff  pt is critically ill now: dw cards    7/15:    left sided pleural effusion:: transudative pl effusion with no with: certainly not the source for MRSA in blood:   Ac hypercarbic resp fialure: now intubated s/p acls protocol last night -s/p cardiac arrest: ROSC 10 mts:   CAD: cont per cards: still on vasopressors: wean as tolerated ? needs molly ro tule out IE : would defer to cards: Cotn antbitioc  CABG: cont current meds  ESRD: on HD   DM: monitor and control  HTN: in shock: now on vasopressors:   A fibrillation: on ac:   GI Bleed: per gi :  DW  staff  pt is critically ill now:     7/16"  left sided pleural effusion:: transudative pl effusion with no with: certainly not the source for MRSA in blood: ?source for MRSA:   Ac hypercarbic resp fialure: now intubated s/p acls protocol last night -s/p cardiac arrest: ROSC 10 mts: he seems to respond off sedation:per MICU note:   CAD: cont per cards: still on vasopressors: wean as tolerated ? needs molly ro tule out IE : would defer to cards: Cotn antbitioc  CABG: cont current meds  ESRD: on HD   DM: monitor and control  HTN: in shock: now on vasopressors:   A fibrillation: on ac:   GI Bleed: per gi :  DW  staff: PMD  pt is critically ill now:      7/17:    left sided pleural effusion:: transudative pl effusion with no with: certainly not the source for MRSA in blood: ?source for MRSA: for molly on monday  Ac hypercarbic resp fialure: now intubated s/p acls protocol last night -s/p cardiac arrest: ROSC 10 mts:   CAD: cont per cards: still on vasopressors: wean as tolerated ? needs molly ro tule out IE : would defer to cards: Cont antibiotics:   CABG: cont current meds  ESRD: on HD   DM: monitor and control  HTN: in shock: now on vasopressors:   A fibrillation: on ac:   GI Bleed: per gi :  DW  staff: PMD  pt is critically ill now:    7/18:    left sided pleural effusion:: transudative pl effusion with no with: certainly not the source for MRSA in blood: ?source for MRSA: for molly on monday: he is off sedation   Ac hypercarbic resp fialure: now intubated s/p acls protocol last night -s/p cardiac arrest: ROSC 10 mts:   Acute cva: noted on ct scan head: neurology following: already on heparin   CAD: cont per cards: still on vasopressors: wean as tolerated ? needs molly ro tule out IE : would defer to cards: Cont antibiotics:   CABG: cont current meds  ESRD: on HD   DM: monitor and control  HTN: in shock: now on vasopressors:   A fibrillation: on ac:   GI Bleed: per gi :  pt is critically ill now:    7/19:      left sided pleural effusion:: transudative pl effusion with no with: certainly not the source for MRSA in blood: ?source for MRSA: for molly today: he is off sedation but still on pressors:   Ac hypercarbic resp fialure: now intubated s/p acls protocol last night -s/p cardiac arrest: ROSC 10 mts:   Acute cva: noted on ct scan head: neurology following: already on heparin   CAD: cont per cards: still on vasopressors: wean as tolerated ? needs molly ro tule out IE : would defer to cards: Cont antibiotics: for molly today  CABG: cont current meds  ESRD: on HD   DM: monitor and control  HTN: in shock: now on vasopressors:   A fibrillation: on ac:   GI Bleed: per gi :  pt is critically ill now:    7/20  Acute Hypercarbic Respiratory Failure  -S/p RRT, tx to MICU 7/8, intubated 7/13 s/p cardiac arrest w/ ROSC  -CPAP trials, ventilator management per MICU team  -Pulmonary toileting, suction PRN   L pleural effusion   -Loculated L pleural effusion on CT chest, s/p CT placement by thoracic 7/8  -Transudative effusion, Cx negative   -CT since removed   -Repeat CT chest with small b/l partially loculated effusions, adjacent atelectasis  Bacteremia  -BC 7/9 MRSA+  -ABX per ID  -Repeat BC NGTD   -F/u cardiology recs regarding MOLLY   CAD (s/p CABG) - per cards   ESRD - HD per renal   GI bleed - GI recs noted     7/21  Acute Hypercarbic Respiratory Failure  -S/p RRT, tx to MICU 7/8, intubated 7/13 s/p cardiac arrest w/ ROSC  -CPAP trials, ventilator management per MICU team  -Pulmonary toileting, suction PRN   L pleural effusion   -Loculated L pleural effusion on CT chest, s/p CT placement by thoracic 7/8  -Transudative effusion, Cx negative   -CT since removed   -Repeat CT chest with small b/l partially loculated effusions, adjacent atelectasis  Bacteremia  -BC 7/9 MRSA+  -ABX per ID  -Repeat BC NGTD   -MOLLY now with MV vegetation per cards  -Pending CT lumbar spine   -F/u CT surgery recs  CAD (s/p CABG) - per cards   ESRD - HD per renal   GI bleed - GI recs noted     7/22  Acute Hypercarbic Respiratory Failure  --S/p RRT, tx to MICU 7/8, intubated 7/13 s/p cardiac arrest w/ ROSC-10 mts  --CPAP trials, ventilator management per MICU team  --Pulmonary toileting, suction PRN   L pleural effusion   --Loculated L pleural effusion on CT chest, s/p CT placement by thoracic 7/8  --Transudative effusion, Cx negative   --CT since removed   --Repeat CT chest with small b/l partially loculated effusions, adjacent atelectasis  Bacteremia  --BC 7/9 MRSA+, repeat BC NGTD   --MOLLY now with MV vegetation, not a candidate for surgery per CTS  --CT lumbar spine with no evidence of discitis   --ABX per ID  DVT prophylaxis  --AC held due to drop in H/H  --Concern for R thigh hematoma, vascular recs noted   CAD (s/p CABG) - per cards   ESRD - HD per renal   GI bleed - resolved, GI recs noted   Care per MICU team     7/23  Acute Hypercarbic Respiratory Failure  --S/p RRT, tx to MICU 7/8, intubated 7/13 s/p cardiac arrest w/ ROSC-10 mts  --Tolerating CPAP trials, plan to extubate per RN   --Ventilator management per MICU team  --Pulmonary toileting, suction PRN   L pleural effusion   --Loculated L pleural effusion on CT chest, s/p CT placement by thoracic 7/8  --Transudative effusion, Cx negative   --CT since removed   --Repeat CT chest with small b/l partially loculated effusions, adjacent atelectasis  Bacteremia  --BC 7/9 MRSA+, repeat BC NGTD   --MOLLY with MV vegetation, not a candidate for surgery per CTS  --CT lumbar spine with no evidence of discitis   --ABX per ID  DVT prophylaxis  --AC held due to drop in H/H  --Concern for R thigh hematoma, vascular recs noted   CAD (s/p CABG) - per cards   ESRD - HD per renal   GI bleed - resolved, GI recs noted   Care per MICU team     7/24:    Acute Hypercarbic Respiratory Failure : S/p RRT, tx to MICU 7/8, intubated 7/13 s/p cardiac arrest w/ ROSC-10 mts extubated now: alert and awake:   L pleural effusion : Loculated L pleural effusion on CT chest, s/p CT placement by thoracic 7/8: Transudative effusion, Cx negative : CT since removed : Repeat CT chest with small b/l partially loculated effusions, adjacent atelectasis  Bacteremia: -serratia: now: ij new blood cultures cont antibiotics : MOLLY with MV vegetation, not a candidate for surgery per CTS  --CT lumbar spine with no evidence of discitis   --ABX per ID  DVT prophylaxis : has hematoma in adductionr muscles: AC held due to drop in H/H  CAD (s/p CABG) - per cards   ESRD - HD per renal   GI bleed - resolved, GI recs noted   Care per MICU team : della resident today

## 2021-07-24 NOTE — PROGRESS NOTE ADULT - SUBJECTIVE AND OBJECTIVE BOX
[facility] - S Eugene / Marlean S /D Cruz/ S Narendra/ WILY Macias/ Alexandre Esposito / WILMER Hermosillou/ O Jessi  service -[phone], office [phone]  ---------------------------------------------------------------------------------------------------------------    Patient seen and examined bedside    Subjective and Objective: No overnight events, sob resolved. No complaints today. feeling better    Allergies: lisinopril (Other)  opioid-like analgesics (Other)      Hospital Medications:   MEDICATIONS  (STANDING):  atorvastatin 40 milliGRAM(s) Oral at bedtime  cefepime   IVPB 1000 milliGRAM(s) IV Intermittent daily  chlorhexidine 2% Cloths 1 Application(s) Topical daily  dextrose 40% Gel 15 Gram(s) Oral once  dextrose 40% Gel 15 Gram(s) Oral once  dextrose 5%. 1000 milliLiter(s) (50 mL/Hr) IV Continuous <Continuous>  dextrose 5%. 1000 milliLiter(s) (100 mL/Hr) IV Continuous <Continuous>  dextrose 50% Injectable 25 Gram(s) IV Push once  dextrose 50% Injectable 12.5 Gram(s) IV Push once  dextrose 50% Injectable 25 Gram(s) IV Push once  diltiazem    Tablet 30 milliGRAM(s) Oral four times a day  epoetin danilo-epbx (RETACRIT) Injectable 63628 Unit(s) IV Push <User Schedule>  glucagon  Injectable 1 milliGRAM(s) IntraMuscular once  insulin lispro (ADMELOG) corrective regimen sliding scale   SubCutaneous every 6 hours  midodrine 20 milliGRAM(s) Oral every 8 hours  norepinephrine Infusion 0.05 MICROgram(s)/kG/Min (8.08 mL/Hr) IV Continuous <Continuous>  pantoprazole    Tablet 40 milliGRAM(s) Oral two times a day  vancomycin  IVPB 750 milliGRAM(s) IV Intermittent <User Schedule>      REVIEW OF SYSTEMS:  CONSTITUTIONAL: No weakness, fevers or chills  EYES/ENT: No visual changes;  No vertigo or throat pain   NECK: No pain or stiffness  RESPIRATORY: No cough, wheezing, hemoptysis; No shortness of breath  CARDIOVASCULAR: No chest pain or palpitations.  GASTROINTESTINAL: No abdominal or epigastric pain. No nausea, vomiting, or hematemesis; No diarrhea or constipation. No melena or hematochezia.  GENITOURINARY: No dysuria, frequency, foamy urine, urinary urgency, incontinence or hematuria  NEUROLOGICAL: No numbness or weakness  SKIN: No itching, burning, rashes, or lesions   VASCULAR: No bilateral lower extremity edema.   All other review of systems is negative unless indicated above.    VITALS:  T(F): 98.9 (07-24-21 @ 12:00), Max: 99.9 (07-23-21 @ 16:00)  HR: 97 (07-24-21 @ 14:00)  BP: 113/53 (07-24-21 @ 14:00)  RR: 31 (07-24-21 @ 14:00)  SpO2: 99% (07-24-21 @ 14:00)  Wt(kg): --    07-23 @ 07:01  -  07-24 @ 07:00  --------------------------------------------------------  IN: 247.2 mL / OUT: 0 mL / NET: 247.2 mL    07-24 @ 07:01  -  07-24 @ 14:39  --------------------------------------------------------  IN: 1139.8 mL / OUT: 2700 mL / NET: -1560.2 mL          PHYSICAL EXAM:  Constitutional: NAD  HEENT: anicteric sclera, oropharynx clear  Neck: No JVD  Respiratory: CTAB, no wheezes, rales or rhonchi  Cardiovascular: S1, S2, RRR  Gastrointestinal: BS+, soft, NT/ND  Extremities: No cyanosis or clubbing. No peripheral edema  Neurological: A/O x 3, no focal deficits  Psychiatric: Normal mood, normal affect  : No CVA tenderness. No wagner.   Skin: No rashes  Vascular Access:    LABS:  07-24    134<L>  |  92<L>  |  40<H>  ----------------------------<  190<H>  4.9   |  25  |  5.33<H>    Ca    9.0      24 Jul 2021 03:25  Phos  6.0     07-24  Mg     2.10     07-24    TPro  5.8<L>  /  Alb  2.2<L>  /  TBili  1.3<H>  /  DBili      /  AST  78<H>  /  ALT  17  /  AlkPhos  211<H>  07-24    Creatinine Trend: 5.33 <--, 4.11 <--, 5.90 <--, 4.75 <--, 6.78 <--, 5.79 <--, 5.45 <--, 4.43 <--                        7.8    16.34 )-----------( 262      ( 24 Jul 2021 03:25 )             26.5     Urine Studies:        RADIOLOGY & ADDITIONAL STUDIES:   [facility] - S Eugene / Marlena S /D Cruz/ S Narendra/ WILY Macias/ Alexandre Esposito / WILMER Hermosillou/ O Jessi  service -[phone], office [phone]  ---------------------------------------------------------------------------------------------------------------    Patient seen and examined bedside in MICU    Subjective and Objective: No overnight events, denied sob. No complaints today. feeling better  on dec dose Levophed drip   son, RN bedside    Allergies: lisinopril (Other)  opioid-like analgesics (Other)      Hospital Medications:   MEDICATIONS  (STANDING):  atorvastatin 40 milliGRAM(s) Oral at bedtime  cefepime   IVPB 1000 milliGRAM(s) IV Intermittent daily  chlorhexidine 2% Cloths 1 Application(s) Topical daily  dextrose 40% Gel 15 Gram(s) Oral once  dextrose 40% Gel 15 Gram(s) Oral once  dextrose 5%. 1000 milliLiter(s) (50 mL/Hr) IV Continuous <Continuous>  dextrose 5%. 1000 milliLiter(s) (100 mL/Hr) IV Continuous <Continuous>  dextrose 50% Injectable 25 Gram(s) IV Push once  dextrose 50% Injectable 12.5 Gram(s) IV Push once  dextrose 50% Injectable 25 Gram(s) IV Push once  diltiazem    Tablet 30 milliGRAM(s) Oral four times a day  epoetin danilo-epbx (RETACRIT) Injectable 49360 Unit(s) IV Push <User Schedule>  glucagon  Injectable 1 milliGRAM(s) IntraMuscular once  insulin lispro (ADMELOG) corrective regimen sliding scale   SubCutaneous every 6 hours  midodrine 20 milliGRAM(s) Oral every 8 hours  norepinephrine Infusion 0.05 MICROgram(s)/kG/Min (8.08 mL/Hr) IV Continuous <Continuous>  pantoprazole    Tablet 40 milliGRAM(s) Oral two times a day  vancomycin  IVPB 750 milliGRAM(s) IV Intermittent <User Schedule>    VITALS:  T(F): 98.9 (07-24-21 @ 12:00), Max: 99.9 (07-23-21 @ 16:00)  HR: 97 (07-24-21 @ 14:00)  BP: 113/53 (07-24-21 @ 14:00)  RR: 31 (07-24-21 @ 14:00)  SpO2: 99% (07-24-21 @ 14:00)  Wt(kg): --    07-23 @ 07:01  -  07-24 @ 07:00  --------------------------------------------------------  IN: 247.2 mL / OUT: 0 mL / NET: 247.2 mL    07-24 @ 07:01  -  07-24 @ 14:39  --------------------------------------------------------  IN: 1139.8 mL / OUT: 2700 mL / NET: -1560.2 mL    PHYSICAL EXAM:  PHYSICAL EXAM:  Constitutional: NAD  HEENT: anicteric sclera  Neck: No JVD  Respiratory: CTAB, no wheezes, rales or rhonchi  Cardiovascular: S1, S2, RRR  Gastrointestinal: BS+, soft, NT/ND  Extremities: 1+ peripheral edema b/l   Neurological: awake, alert   Psychiatric: Normal mood, normal affect  : No wagner.   WES AVF+thrill     LABS:  07-24    134<L>  |  92<L>  |  40<H>  ----------------------------<  190<H>  4.9   |  25  |  5.33<H>    Ca    9.0      24 Jul 2021 03:25  Phos  6.0     07-24  Mg     2.10     07-24    TPro  5.8<L>  /  Alb  2.2<L>  /  TBili  1.3<H>  /  DBili      /  AST  78<H>  /  ALT  17  /  AlkPhos  211<H>  07-24    Creatinine Trend: 5.33 <--, 4.11 <--, 5.90 <--, 4.75 <--, 6.78 <--, 5.79 <--, 5.45 <--, 4.43 <--                        7.8    16.34 )-----------( 502 ( 24 Jul 2021 03:25 )             26.5     Urine Studies:        RADIOLOGY & ADDITIONAL STUDIES:

## 2021-07-24 NOTE — PROGRESS NOTE ADULT - ATTENDING COMMENTS
70 M with history of CAD s/p CABG, ESRD on HD, L pleural effusion, paroxysmal AF on A/C presented initially with chest pain and concern for GIB to [facility]. Transferred here for further management. Course complicated by development of septic shock due to MRSA and serratia bacteremia requiring vasopressor support. 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 Chest was placed 7/8 and removed 7/12 for loculate pleural effusion. Course further c/b cardiac arrest 7/13 and acute hypoxemic respiratory failure. Was extubated yesterday 7/24 and is breathing comfortable today. Anticoagulation on hold for now due to development of right groin hematoma. Hgb remains stable. Will continue antibiotics. Follow up with ID. Continue HD as per nephrology. Will attempt to titrate down on norepinephrine.

## 2021-07-24 NOTE — PROGRESS NOTE ADULT - ASSESSMENT
70 m with DM, HTN, CAD s/p CABG, a-fib, ESRD on HD (MWF),  transferred from [facility] for evaluation of chest pain after HD and GIB,   MRSA bacteremia 7/7, cleared 711, TRUMAN with 1.3 cm mitral valve vegetation   cardiac arrest due to vtach 7/13  new fever and blood cx 7/22 showed serratia    * check urine cx  * switch zosyn to cefepime for now until sensitivities are back  * repeat blood cx  * c/w vanco post HD    The above assessment and plan was discussed with the primary team    Susan Davis MD  Pager [phone]  After 5pm and on weekends call [phone]

## 2021-07-24 NOTE — PROGRESS NOTE ADULT - ASSESSMENT
69 yo M with PMhx of CAD s/p CABG (course at that time complicated by pleural effusion requiring chest tubes), ESRD on HD (TTS), DMII, HTN, afib on coumadin, and anemia presented from [facility] for evaluation of chest pain and GIB. Renal following for ESRD Mx. s/p cardiac arrest. CTH showed acute infarcts. Further w/u, MRSA bacteremia, TRUMAN showed MR vegetation.     ESRD  Maintenance schedule TTS  K acceptable  has p edema  s/p HD earlier today, Rx sheet reviewed, net UF 2kg, tolerated well. uneventful.  plan for next routine HD Monday  Improved hemodynamics, pressor titration as per ICU team. on Levophed wean as tolerated. agree w/inc midodrine dose  On diltiazem for afib rate control.   c/w vancomycin ivpb post hd, dosed by levels, for MRSA bacteremia and endocarditis.   dose all meds for ESRD  renal diet, fluid restriction    Anemia in CKD+GIB- Hb <goal  c/w Epo 20k tiw with HD  transfused prbc     d/w ICU RN, HD RN, son bedside  labs, chart reviewed  pl call for any q's   cell [phone]

## 2021-07-24 NOTE — PROGRESS NOTE ADULT - SUBJECTIVE AND OBJECTIVE BOX
Follow Up:  endocarditis    Interval History: pt afebrile, blood cx from 7/22 came back with serratia so pt was started on zosyn    ROS:      All other systems negative    Constitutional: no fever, no chills  Cardiovascular:  no chest pain, no palpitation  Respiratory:  no SOB, no cough  GI:  no abd pain, no vomiting, no diarrhea  urinary: no dysuria, no hematuria, no flank pain  musculoskeletal:  no joint pain, no joint swelling  skin:  no rash  neurology:  no headache, no seizure    Allergies  lisinopril (Other)  opioid-like analgesics (Other)        ANTIMICROBIALS:  cefepime   IVPB 1000 daily  vancomycin  IVPB 750 <User Schedule>      OTHER MEDS:  acetaminophen   Tablet .. 1000 milliGRAM(s) Oral every 6 hours PRN  atorvastatin 40 milliGRAM(s) Oral at bedtime  calamine/zinc oxide Lotion 1 Application(s) Topical three times a day PRN  chlorhexidine 2% Cloths 1 Application(s) Topical daily  dextrose 40% Gel 15 Gram(s) Oral once  dextrose 40% Gel 15 Gram(s) Oral once  dextrose 5%. 1000 milliLiter(s) IV Continuous <Continuous>  dextrose 5%. 1000 milliLiter(s) IV Continuous <Continuous>  dextrose 50% Injectable 25 Gram(s) IV Push once  dextrose 50% Injectable 12.5 Gram(s) IV Push once  dextrose 50% Injectable 25 Gram(s) IV Push once  diltiazem    Tablet 30 milliGRAM(s) Oral four times a day  epoetin danilo-epbx (RETACRIT) Injectable 12586 Unit(s) IV Push <User Schedule>  glucagon  Injectable 1 milliGRAM(s) IntraMuscular once  insulin lispro (ADMELOG) corrective regimen sliding scale   SubCutaneous every 6 hours  midodrine 20 milliGRAM(s) Oral every 8 hours  norepinephrine Infusion 0.05 MICROgram(s)/kG/Min IV Continuous <Continuous>  pantoprazole    Tablet 40 milliGRAM(s) Oral two times a day      Vital Signs Last 24 Hrs  T(C): 37.2 (24 Jul 2021 12:00), Max: 37.7 (23 Jul 2021 16:00)  T(F): 98.9 (24 Jul 2021 12:00), Max: 99.9 (23 Jul 2021 16:00)  HR: 108 (24 Jul 2021 12:31) (92 - 116)  BP: 122/40 (24 Jul 2021 12:31) (77/51 - 183/80)  BP(mean): 54 (24 Jul 2021 12:31) (40 - 149)  RR: 30 (24 Jul 2021 12:31) (20 - 35)  SpO2: 97% (24 Jul 2021 12:31) (53% - 100%)    Physical Exam:  General:    NAD,  non toxic  Cardio:     regular S1, S2  Respiratory:    clear b/l,  abd:     soft,   BS +,   no tenderness  :   no CVAT,  no suprapubic tenderness,   no  wagner  Musculoskeletal:   no joint swelling  vascular: no phlebitis  Skin:    no rash  Neurologic:     no focal deficit  psych: normal affect                          7.8    16.34 )-----------( 262      ( 24 Jul 2021 03:25 )             26.5       07-24    134<L>  |  92<L>  |  40<H>  ----------------------------<  190<H>  4.9   |  25  |  5.33<H>    Ca    9.0      24 Jul 2021 03:25  Phos  6.0     07-24  Mg     2.10     07-24    TPro  5.8<L>  /  Alb  2.2<L>  /  TBili  1.3<H>  /  DBili  x   /  AST  78<H>  /  ALT  17  /  AlkPhos  211<H>  07-24          MICROBIOLOGY:  Vancomycin Level, Random: 19.2 ug/mL (07-24-21 @ 07:07)  v  .Blood Blood  07-22-21   Growth in aerobic bottle: Gram Negative Rods  ***Blood Panel PCR results on this specimen are available  approximately 3 hours after the Gram stain result.***  Gram stain, PCR, and/or culture results may not always  correspond due to difference in methodologies.  ************************************************************  This PCR assay was performed by multiplex PCR. This  Assay tests for 66 bacterial and resistance gene targets.  Please refer to the [facility] test directory  at [email or web address] for details.  --  Blood Culture PCR      .Blood Blood  07-12-21   No Growth Final  --  --      .Blood Blood-Peripheral  07-11-21   No Growth Final  --  --      .Blood Blood-Peripheral  07-09-21   Growth in aerobic and anaerobic bottles: Methicillin resistant  Staphylococcus aureus  See previous culture [id number]  --    Growth in aerobic and anaerobic bottles: Gram Positive Cocci in Clusters      .Blood Blood  07-09-21   No Growth Final  --  --      .Body Fluid pleural fluid  07-08-21   No growth at 5 days  --    polymorphonuclear leukocytes seen  No organisms seen  by cytocentrifuge      .Blood Blood-Venous  07-07-21   Growth in aerobic and anaerobic bottles: Methicillin resistant  Staphylococcus aureus  ***Blood Panel PCR results on this specimen are available  approximately 3 hours after the Gram stain result.***  Gram stain, PCR, and/or culture results may notalways  correspond due to difference in methodologies.  ************************************************************  This PCR assay was performed by multiplex PCR. This  Assay tests for 66 bacterial and resistance gene targets.  Please refer to the [facility] test directory  at [email or web address] for details.  --  Blood Culture PCR  Methicillin resistant Staphylococcus aureus      .Blood Blood  07-07-21   Growth in aerobic and anaerobic bottles: Methicillin resistant  Staphylococcus aureus  See previous culture [id number]  --    Growth in aerobic bottle: Gram Positive Cocci in Clusters  Growth in anaerobic bottle: Gram Positive Cocci in Clusters                RADIOLOGY:  Images independently visualized and reviewed personally, findings as below  < from: US Testicles (07.22.21 @ 13:03) >  IMPRESSION:    No focal testicular abnormality.  Extensive scrotal swelling diffusely.      < end of copied text >  < from: CT Angio Lower Extremity w/ IV Cont, Bilateral (07.21.21 @ 13:36) >  IMPRESSION:  A moderate-sized hematoma in the right adductor muscle group. No active extravasation on arterial phase.  Multilevel mild to moderate stenosis of the femoral and popliteal arteries bilaterally.        < end of copied text >  < from: TRUMAN w/o TTE (w/3D Echo) (07.19.21 @ 09:02) >  CONCLUSIONS:  1. A large (about  1.3 cm X 0.3 cm),  mobile echodensity is  visualized attached to the atrial aspect of the medial (P3)  scallop of the posterior mitral valve leaflet and is  consistent with a vegetation.  In addition, a portion of  the anterior mitral leaflet is markedly thickened and this  may represent sessile vegetation. Moderate mitral  regurgitation.  Vena contracta width about  0.4 - 0.5 cm.  2. Calcified trileaflet aortic valve with normal opening.  No vegetations seen in association with the aortic valve.  No aortic valve regurgitation seen.  3. Normal aortic root.  Mild non-mobile atherosclerotic  plaque in the aortic arch.  Severe, non-mobile  atherosclerotic plaque in the descending thoracic aorta.  4. Normal left atrium.  No left atrial or left atrial  appendage thrombus.  5. Mild segmental left ventricular systolic dysfunction.  Hypokinesis of the basal to mid inferior wall.  6. Right ventricular enlargement with decreased right  ventricular systolic function.  7. Normal tricuspid valve.  No vegetations seen in  association with the tricuspid valve.  Moderate-severe  tricuspid regurgitation.  8. Agitated saline injection and color flow Doppler  demonstrate evidence of a patent foramen ovale.    < end of copied text >

## 2021-07-24 NOTE — PROGRESS NOTE ADULT - SUBJECTIVE AND OBJECTIVE BOX
Date of Service: 07-24-21 @ 13:27    Patient is a 70y old  Male who presents with a chief complaint of Chest pain and GIB (24 Jul 2021 13:00)      Any change in ROS:  he is extubated:  suctioning himself:       MEDICATIONS  (STANDING):  atorvastatin 40 milliGRAM(s) Oral at bedtime  cefepime   IVPB 1000 milliGRAM(s) IV Intermittent daily  chlorhexidine 2% Cloths 1 Application(s) Topical daily  dextrose 40% Gel 15 Gram(s) Oral once  dextrose 40% Gel 15 Gram(s) Oral once  dextrose 5%. 1000 milliLiter(s) (50 mL/Hr) IV Continuous <Continuous>  dextrose 5%. 1000 milliLiter(s) (100 mL/Hr) IV Continuous <Continuous>  dextrose 50% Injectable 25 Gram(s) IV Push once  dextrose 50% Injectable 12.5 Gram(s) IV Push once  dextrose 50% Injectable 25 Gram(s) IV Push once  diltiazem    Tablet 30 milliGRAM(s) Oral four times a day  epoetin danilo-epbx (RETACRIT) Injectable 35700 Unit(s) IV Push <User Schedule>  glucagon  Injectable 1 milliGRAM(s) IntraMuscular once  insulin lispro (ADMELOG) corrective regimen sliding scale   SubCutaneous every 6 hours  midodrine 20 milliGRAM(s) Oral every 8 hours  norepinephrine Infusion 0.05 MICROgram(s)/kG/Min (8.08 mL/Hr) IV Continuous <Continuous>  pantoprazole    Tablet 40 milliGRAM(s) Oral two times a day  vancomycin  IVPB 750 milliGRAM(s) IV Intermittent <User Schedule>    MEDICATIONS  (PRN):  acetaminophen   Tablet .. 1000 milliGRAM(s) Oral every 6 hours PRN Temp greater or equal to 38C (100.4F), Mild Pain (1 - 3), Moderate Pain (4 - 6)  calamine/zinc oxide Lotion 1 Application(s) Topical three times a day PRN Itching    Vital Signs Last 24 Hrs  T(C): 37.2 (24 Jul 2021 12:00), Max: 37.7 (23 Jul 2021 16:00)  T(F): 98.9 (24 Jul 2021 12:00), Max: 99.9 (23 Jul 2021 16:00)  HR: 108 (24 Jul 2021 12:31) (92 - 116)  BP: 122/40 (24 Jul 2021 12:31) (77/51 - 183/80)  BP(mean): 54 (24 Jul 2021 12:31) (40 - 149)  RR: 30 (24 Jul 2021 12:31) (20 - 35)  SpO2: 97% (24 Jul 2021 12:31) (53% - 100%)    I&O's Summary    23 Jul 2021 07:01  -  24 Jul 2021 07:00  --------------------------------------------------------  IN: 247.2 mL / OUT: 0 mL / NET: 247.2 mL    24 Jul 2021 07:01  -  24 Jul 2021 13:27  --------------------------------------------------------  IN: 1076.8 mL / OUT: 2700 mL / NET: -1623.2 mL          Physical Exam:   GENERAL: NAD, well-groomed, well-developed  HEENT: KATHY/   Atraumatic, Normocephalic  ENMT: No tonsillar erythema, exudates, or enlargement; Moist mucous membranes, Good dentition, No lesions  NECK: Supple, No JVD, Normal thyroid  CHEST/LUNG: Clear to auscultaion, ; No rales, rhonchi, wheezing, or rubs  CVS: Regular rate and rhythm; No murmurs, rubs, or gallops  GI: : Soft, Nontender, Nondistended; Bowel sounds present  NERVOUS SYSTEM:  Alert & awake: sexcretions +  EXTREMITIES: - edema  LYMPH: No lymphadenopathy noted  SKIN: No rashes or lesions  ENDOCRINOLOGY: No Thyromegaly  PSYCH: calm    Labs:  ABG - ( 23 Jul 2021 01:31 )  pH, Arterial: 7.39  pH, Blood: x     /  pCO2: 44    /  pO2: 43    / HCO3: 25    / Base Excess: 1.7   /  SaO2: 70.7              CARDIAC MARKERS ( 23 Jul 2021 01:31 )  x     / x     / 599 U/L / x     / x                                7.8    16.34 )-----------( 262      ( 24 Jul 2021 03:25 )             26.5                         7.9    22.20 )-----------( 398      ( 23 Jul 2021 01:31 )             26.0                         7.8    20.86 )-----------( 341      ( 22 Jul 2021 15:31 )             25.6                         7.7    20.00 )-----------( 312      ( 22 Jul 2021 06:52 )             25.2                         6.6    14.45 )-----------( 251      ( 22 Jul 2021 02:39 )             22.1                         7.1    14.58 )-----------( 275      ( 21 Jul 2021 18:08 )             24.0                         7.6    12.54 )-----------( 252      ( 21 Jul 2021 03:27 )             25.1                         6.4    10.46 )-----------( 189      ( 20 Jul 2021 21:23 )             21.6     07-24    134<L>  |  92<L>  |  40<H>  ----------------------------<  190<H>  4.9   |  25  |  5.33<H>  07-23    134<L>  |  93<L>  |  27<H>  ----------------------------<  127<H>  4.2   |  22  |  4.11<H>  07-22    134<L>  |  94<L>  |  42<H>  ----------------------------<  99  4.6   |  22  |  5.90<H>  07-21    134<L>  |  93<L>  |  32<H>  ----------------------------<  109<H>  4.1   |  23  |  4.75<H>    Ca    9.0      24 Jul 2021 03:25  Ca    8.7      23 Jul 2021 01:31  Phos  6.0     07-24  Phos  4.5     07-23  Mg     2.10     07-24  Mg     2.00     07-23    TPro  5.8<L>  /  Alb  2.2<L>  /  TBili  1.3<H>  /  DBili  x   /  AST  78<H>  /  ALT  17  /  AlkPhos  211<H>  07-24  TPro  5.8<L>  /  Alb  2.6<L>  /  TBili  1.1  /  DBili  x   /  AST  42<H>  /  ALT  9   /  AlkPhos  141<H>  07-23  TPro  5.1<L>  /  Alb  2.3<L>  /  TBili  0.8  /  DBili  x   /  AST  33  /  ALT  8   /  AlkPhos  115  07-22  TPro  5.3<L>  /  Alb  2.3<L>  /  TBili  1.3<H>  /  DBili  x   /  AST  38  /  ALT  8   /  AlkPhos  109  07-21    CAPILLARY BLOOD GLUCOSE      POCT Blood Glucose.: 126 mg/dL (24 Jul 2021 11:42)  POCT Blood Glucose.: 214 mg/dL (24 Jul 2021 06:20)  POCT Blood Glucose.: 246 mg/dL (23 Jul 2021 23:33)  POCT Blood Glucose.: 142 mg/dL (23 Jul 2021 17:42)      LIVER FUNCTIONS - ( 24 Jul 2021 03:25 )  Alb: 2.2 g/dL / Pro: 5.8 g/dL / ALK PHOS: 211 U/L / ALT: 17 U/L / AST: 78 U/L / GGT: x           PTT - ( 24 Jul 2021 03:25 )  PTT:37.9 sec          RECENT CULTURES:  07-22 @ 22:57 .Blood Blood   PCR    Growth in aerobic bottle: Gram Negative Rods    Blood Culture PCR  Blood Culture PCR     Growth in aerobic bottle: Gram Negative Rods  ***Blood Panel PCR results on this specimen are available  approximately 3 hours after the Gram stain result.***  Gram stain, PCR, and/or culture results may not always  correspond due to difference in methodologies.  ************************************************************  This PCR assay was performed by multiplex PCR. This  Assay tests for 66 bacterial and resistance gene targets.  Please refer to the [facility] test directory  at [email or web address] for details.          RESPIRATORY CULTURES:      r< from: US Testicles (07.22.21 @ 13:03) >  FINDINGS:    RIGHT:  Right testis: 2.7 cm x 2.4 cm x  2.1 cm. Normal echogenicity and echotexture with no masses or areas of architectural distortion. Normal arterial and venous blood flow pattern.  Right epididymis: Within normal limits.  Right hydrocele: None.  Right varicocele: None.    LEFT:  Left testis: 3.2 cm x 2.4 cm x 2.2 cm. Normal echogenicity and echotexture with no masses or areas of architectural distortion. Normal arterial and venous blood flow pattern.  Left epididymis: Within normal limits.  Left hydrocele: Small.  Left varicocele: None.  Extensive diffuse scrotal swelling.    IMPRESSION:    No focal testicular abnormality.  Extensive scrotal swelling diffusely.      --- End of Report ---    < from: CT Angio Lower Extremity w/ IV Cont, Bilateral (07.21.21 @ 13:36) >  rmed through the abdomen, pelvis, and lower extremities down to the toes.  Delayed images through the calves were also obtained. Sagittal and coronal reformats as well as 3D reconstructions were performed.    FINDINGS:    CENTRAL ARTERIAL SYSTEM:    Marked atherosclerotic calcifications of the aortoiliac system and of the aortic branch vessels. The branch vessels are patent.    RIGHT LOWER EXTREMITY: Small knee joint effusion, marked subcutaneous edema. Surgical clips medially in the right leg. A 10.5 cm hematoma with fluid and hemorrhage level within the adductor muscles. No evidence of active extravasation.    Multilevel mildto moderate stenosis of the femoral and popliteal arteries. The arteries of the lower leg are not well evaluated due to extensive calcifications. Tibioperoneal trunk is patent.    LEFT LOWER EXTREMITY: Small knee joint effusion, marked subcutaneous edema.    Multilevel mild to moderate stenosis of the femoral and popliteal arteries. The arteries of the lower leg are not well evaluated due to extensive calcifications. Tibioperoneal trunk is patent. Stent in the distal femoral artery.    ADDITIONALFINDINGS: Reflux of contrast into distended IVC and hepatic veins. Enteric catheter in mid gastric lumen. Marked anasarca. Small-to-moderate ascites. Atrophic kidneys.    IMPRESSION:  A moderate-sized hematoma in the right adductor muscle group. No active extravasation on arterial phase.  Multilevel mild to moderate stenosis of the femoral and popliteal arteries bilaterally.        --- End of Report ---              MIKE NOLAN MD; Attending Radiologist  This document has been electronically signed. Jul 21 2021  2:16PM    < end of copied text >            MIKE NOLAN MD; Attending Radiologist  This document has been electronically signed. Jul 22 2021  2:41PM    < end of copied text >      Studies  Chest X-RAY  CT SCAN Chest   Venous Dopplers: LE:   CT Abdomen  Others

## 2021-07-24 NOTE — PROGRESS NOTE ADULT - SUBJECTIVE AND OBJECTIVE BOX
Kaiden Ballard  PGY-2 | Internal Medicine    INTERVAL HPI/OVERNIGHT EVENTS: No acute overnight events, Blood culture growing gram negative rods, Serratia, started on Zosyn. Has been able to tolerate PO feeds, pending formal s/s study    SUBJECTIVE: Patient seen and examined at bedside.     CONSTITUTIONAL: No weakness, fevers or chills  EYES/ENT: No visual changes;  No vertigo or throat pain   NECK: No pain or stiffness  RESPIRATORY: No cough, wheezing, hemoptysis; No shortness of breath  CARDIOVASCULAR: No chest pain or palpitations  GASTROINTESTINAL: No abdominal or epigastric pain. No nausea, vomiting, or hematemesis; No diarrhea or constipation. No melena or hematochezia.  GENITOURINARY: No dysuria, frequency or hematuria  NEUROLOGICAL: No numbness or weakness  SKIN: No itching, rashes    OBJECTIVE:    VITAL SIGNS:  ICU Vital Signs Last 24 Hrs  T(C): 37.2 (24 Jul 2021 04:00), Max: 37.7 (23 Jul 2021 16:00)  T(F): 98.9 (24 Jul 2021 04:00), Max: 99.9 (23 Jul 2021 16:00)  HR: 101 (24 Jul 2021 06:40) (81 - 113)  BP: 81/46 (24 Jul 2021 06:40) (81/46 - 234/209)  BP(mean): 52 (24 Jul 2021 06:40) (40 - 215)  ABP: --  ABP(mean): --  RR: 24 (24 Jul 2021 06:40) (17 - 35)  SpO2: 96% (24 Jul 2021 06:40) (92% - 100%)    Mode: CPAP with PS, FiO2: 30, PEEP: 5, MAP: 9    07-22 @ 07:01 - 07-23 @ 07:00  --------------------------------------------------------  IN: 1004.9 mL / OUT: 2500 mL / NET: -1495.1 mL    07-23 @ 07:01 - 07-24 @ 06:57  --------------------------------------------------------  IN: 247.2 mL / OUT: 0 mL / NET: 247.2 mL      CAPILLARY BLOOD GLUCOSE      POCT Blood Glucose.: 214 mg/dL (24 Jul 2021 06:20)      PHYSICAL EXAM:    General: NAD  HEENT: NC/AT; PERRL, clear conjunctiva  Neck: supple  Respiratory: CTA b/l  Cardiovascular: +S1/S2; RRR  Abdomen: soft, NT/ND; +BS x4  Extremities: WWP, 2+ peripheral pulses b/l; no LE edema  Skin: normal color and turgor; no rash  Neurological:    MEDICATIONS:  MEDICATIONS  (STANDING):  atorvastatin 40 milliGRAM(s) Oral at bedtime  chlorhexidine 2% Cloths 1 Application(s) Topical daily  dextrose 40% Gel 15 Gram(s) Oral once  dextrose 40% Gel 15 Gram(s) Oral once  dextrose 5%. 1000 milliLiter(s) (50 mL/Hr) IV Continuous <Continuous>  dextrose 5%. 1000 milliLiter(s) (100 mL/Hr) IV Continuous <Continuous>  dextrose 50% Injectable 25 Gram(s) IV Push once  dextrose 50% Injectable 12.5 Gram(s) IV Push once  dextrose 50% Injectable 25 Gram(s) IV Push once  diltiazem    Tablet 30 milliGRAM(s) Oral four times a day  epoetin danilo-epbx (RETACRIT) Injectable 03013 Unit(s) IV Push <User Schedule>  glucagon  Injectable 1 milliGRAM(s) IntraMuscular once  insulin lispro (ADMELOG) corrective regimen sliding scale   SubCutaneous every 6 hours  midodrine 10 milliGRAM(s) Oral every 8 hours  norepinephrine Infusion 0.05 MICROgram(s)/kG/Min (8.08 mL/Hr) IV Continuous <Continuous>  pantoprazole  Injectable 40 milliGRAM(s) IV Push daily  piperacillin/tazobactam IVPB.. 3.375 Gram(s) IV Intermittent every 12 hours  vancomycin  IVPB 750 milliGRAM(s) IV Intermittent <User Schedule>    MEDICATIONS  (PRN):  acetaminophen   Tablet .. 1000 milliGRAM(s) Oral every 6 hours PRN Temp greater or equal to 38C (100.4F), Mild Pain (1 - 3), Moderate Pain (4 - 6)  calamine/zinc oxide Lotion 1 Application(s) Topical three times a day PRN Itching      ALLERGIES:  Allergies    lisinopril (Other)  opioid-like analgesics (Other)    Intolerances        LABS:                        7.8    16.34 )-----------( 262      ( 24 Jul 2021 03:25 )             26.5     07-24    134<L>  |  92<L>  |  40<H>  ----------------------------<  190<H>  4.9   |  25  |  5.33<H>    Ca    9.0      24 Jul 2021 03:25  Phos  6.0     07-24  Mg     2.10     07-24    TPro  5.8<L>  /  Alb  2.2<L>  /  TBili  1.3<H>  /  DBili  x   /  AST  78<H>  /  ALT  17  /  AlkPhos  211<H>  07-24    PTT - ( 24 Jul 2021 03:25 )  PTT:37.9 sec      RADIOLOGY & ADDITIONAL TESTS: Reviewed. Kaiden Ballard  PGY-2 | Internal Medicine    INTERVAL HPI/OVERNIGHT EVENTS: No acute overnight events, Blood culture growing gram negative rods, Serratia, started on Zosyn now switched to cefepime for better coverage. Has been able to tolerate PO feeds, pending formal s/s study.     SUBJECTIVE: Patient seen and examined at bedside.     CONSTITUTIONAL: No weakness, fevers or chills  EYES/ENT: No visual changes;  No vertigo or throat pain   NECK: No pain or stiffness  RESPIRATORY: No cough, wheezing, hemoptysis; No shortness of breath  CARDIOVASCULAR: No chest pain or palpitations  GASTROINTESTINAL: No abdominal or epigastric pain. No nausea, vomiting, or hematemesis; No diarrhea or constipation. No melena or hematochezia.  GENITOURINARY: No dysuria, frequency or hematuria  NEUROLOGICAL: No numbness or weakness  SKIN: No itching, rashes    OBJECTIVE:    VITAL SIGNS:  ICU Vital Signs Last 24 Hrs  T(C): 37.2 (24 Jul 2021 04:00), Max: 37.7 (23 Jul 2021 16:00)  T(F): 98.9 (24 Jul 2021 04:00), Max: 99.9 (23 Jul 2021 16:00)  HR: 101 (24 Jul 2021 06:40) (81 - 113)  BP: 81/46 (24 Jul 2021 06:40) (81/46 - 234/209)  BP(mean): 52 (24 Jul 2021 06:40) (40 - 215)  ABP: --  ABP(mean): --  RR: 24 (24 Jul 2021 06:40) (17 - 35)  SpO2: 96% (24 Jul 2021 06:40) (92% - 100%)    Mode: CPAP with PS, FiO2: 30, PEEP: 5, MAP: 9    07-22 @ 07:01 - 07-23 @ 07:00  --------------------------------------------------------  IN: 1004.9 mL / OUT: 2500 mL / NET: -1495.1 mL    07-23 @ 07:01 - 07-24 @ 06:57  --------------------------------------------------------  IN: 247.2 mL / OUT: 0 mL / NET: 247.2 mL      CAPILLARY BLOOD GLUCOSE      POCT Blood Glucose.: 214 mg/dL (24 Jul 2021 06:20)      PHYSICAL EXAM:    General: NAD, able to follow basic commands   HEENT: NC/AT; PERRL, clear conjunctiva  Neck: supple  Respiratory: CTA b/l  Cardiovascular: +S1/S2; RRR  Abdomen: soft, NT/ND; +BS x4  Extremities: WWP, 2+ peripheral pulses b/l; no LE edema, hematoma now improving  : testicular swelling now improved   Skin: normal color and turgor; no rash  Neuro: off sedation, mentating well     MEDICATIONS:  MEDICATIONS  (STANDING):  atorvastatin 40 milliGRAM(s) Oral at bedtime  chlorhexidine 2% Cloths 1 Application(s) Topical daily  dextrose 40% Gel 15 Gram(s) Oral once  dextrose 40% Gel 15 Gram(s) Oral once  dextrose 5%. 1000 milliLiter(s) (50 mL/Hr) IV Continuous <Continuous>  dextrose 5%. 1000 milliLiter(s) (100 mL/Hr) IV Continuous <Continuous>  dextrose 50% Injectable 25 Gram(s) IV Push once  dextrose 50% Injectable 12.5 Gram(s) IV Push once  dextrose 50% Injectable 25 Gram(s) IV Push once  diltiazem    Tablet 30 milliGRAM(s) Oral four times a day  epoetin danilo-epbx (RETACRIT) Injectable 09895 Unit(s) IV Push <User Schedule>  glucagon  Injectable 1 milliGRAM(s) IntraMuscular once  insulin lispro (ADMELOG) corrective regimen sliding scale   SubCutaneous every 6 hours  midodrine 10 milliGRAM(s) Oral every 8 hours  norepinephrine Infusion 0.05 MICROgram(s)/kG/Min (8.08 mL/Hr) IV Continuous <Continuous>  pantoprazole  Injectable 40 milliGRAM(s) IV Push daily  piperacillin/tazobactam IVPB.. 3.375 Gram(s) IV Intermittent every 12 hours  vancomycin  IVPB 750 milliGRAM(s) IV Intermittent <User Schedule>    MEDICATIONS  (PRN):  acetaminophen   Tablet .. 1000 milliGRAM(s) Oral every 6 hours PRN Temp greater or equal to 38C (100.4F), Mild Pain (1 - 3), Moderate Pain (4 - 6)  calamine/zinc oxide Lotion 1 Application(s) Topical three times a day PRN Itching      ALLERGIES:  Allergies    lisinopril (Other)  opioid-like analgesics (Other)    Intolerances        LABS:                        7.8    16.34 )-----------( 262      ( 24 Jul 2021 03:25 )             26.5     07-24    134<L>  |  92<L>  |  40<H>  ----------------------------<  190<H>  4.9   |  25  |  5.33<H>    Ca    9.0      24 Jul 2021 03:25  Phos  6.0     07-24  Mg     2.10     07-24    TPro  5.8<L>  /  Alb  2.2<L>  /  TBili  1.3<H>  /  DBili  x   /  AST  78<H>  /  ALT  17  /  AlkPhos  211<H>  07-24    PTT - ( 24 Jul 2021 03:25 )  PTT:37.9 sec      RADIOLOGY & ADDITIONAL TESTS: Reviewed.

## 2021-07-25 LAB
-  AMIKACIN: SIGNIFICANT CHANGE UP
-  AMPICILLIN/SULBACTAM: SIGNIFICANT CHANGE UP
-  AMPICILLIN: SIGNIFICANT CHANGE UP
-  AZTREONAM: SIGNIFICANT CHANGE UP
-  CEFAZOLIN: SIGNIFICANT CHANGE UP
-  CEFEPIME: SIGNIFICANT CHANGE UP
-  CEFOXITIN: SIGNIFICANT CHANGE UP
-  CEFTRIAXONE: SIGNIFICANT CHANGE UP
-  CIPROFLOXACIN: SIGNIFICANT CHANGE UP
-  ERTAPENEM: SIGNIFICANT CHANGE UP
-  GENTAMICIN: SIGNIFICANT CHANGE UP
-  LEVOFLOXACIN: SIGNIFICANT CHANGE UP
-  MEROPENEM: SIGNIFICANT CHANGE UP
-  PIPERACILLIN/TAZOBACTAM: SIGNIFICANT CHANGE UP
-  TOBRAMYCIN: SIGNIFICANT CHANGE UP
-  TRIMETHOPRIM/SULFAMETHOXAZOLE: SIGNIFICANT CHANGE UP
ALBUMIN SERPL ELPH-MCNC: 2.2 G/DL — LOW (ref 3.3–5)
ALP SERPL-CCNC: 219 U/L — HIGH (ref 40–120)
ALT FLD-CCNC: 19 U/L — SIGNIFICANT CHANGE UP (ref 4–41)
ANION GAP SERPL CALC-SCNC: 13 MMOL/L — SIGNIFICANT CHANGE UP (ref 7–14)
APTT BLD: 33.8 SEC — SIGNIFICANT CHANGE UP (ref 27–36.3)
AST SERPL-CCNC: 51 U/L — HIGH (ref 4–40)
BASOPHILS # BLD AUTO: 0.04 K/UL — SIGNIFICANT CHANGE UP (ref 0–0.2)
BASOPHILS NFR BLD AUTO: 0.3 % — SIGNIFICANT CHANGE UP (ref 0–2)
BILIRUB SERPL-MCNC: 1.4 MG/DL — HIGH (ref 0.2–1.2)
BLOOD GAS ARTERIAL - LYTES,HGB,ICA,LACT RESULT: SIGNIFICANT CHANGE UP
BUN SERPL-MCNC: 26 MG/DL — HIGH (ref 7–23)
CALCIUM SERPL-MCNC: 9.1 MG/DL — SIGNIFICANT CHANGE UP (ref 8.4–10.5)
CHLORIDE SERPL-SCNC: 97 MMOL/L — LOW (ref 98–107)
CO2 SERPL-SCNC: 26 MMOL/L — SIGNIFICANT CHANGE UP (ref 22–31)
CREAT SERPL-MCNC: 4.13 MG/DL — HIGH (ref 0.5–1.3)
CULTURE RESULTS: SIGNIFICANT CHANGE UP
EOSINOPHIL # BLD AUTO: 0.11 K/UL — SIGNIFICANT CHANGE UP (ref 0–0.5)
EOSINOPHIL NFR BLD AUTO: 0.8 % — SIGNIFICANT CHANGE UP (ref 0–6)
GLUCOSE BLDC GLUCOMTR-MCNC: 152 MG/DL — HIGH (ref 70–99)
GLUCOSE BLDC GLUCOMTR-MCNC: 164 MG/DL — HIGH (ref 70–99)
GLUCOSE BLDC GLUCOMTR-MCNC: 187 MG/DL — HIGH (ref 70–99)
GLUCOSE BLDC GLUCOMTR-MCNC: 213 MG/DL — HIGH (ref 70–99)
GLUCOSE SERPL-MCNC: 170 MG/DL — HIGH (ref 70–99)
HCT VFR BLD CALC: 24.3 % — LOW (ref 39–50)
HGB BLD-MCNC: 7.2 G/DL — LOW (ref 13–17)
IANC: 11.01 K/UL — HIGH (ref 1.5–8.5)
IMM GRANULOCYTES NFR BLD AUTO: 1.1 % — SIGNIFICANT CHANGE UP (ref 0–1.5)
LYMPHOCYTES # BLD AUTO: 0.69 K/UL — LOW (ref 1–3.3)
LYMPHOCYTES # BLD AUTO: 5.2 % — LOW (ref 13–44)
MAGNESIUM SERPL-MCNC: 2 MG/DL — SIGNIFICANT CHANGE UP (ref 1.6–2.6)
MCHC RBC-ENTMCNC: 27.7 PG — SIGNIFICANT CHANGE UP (ref 27–34)
MCHC RBC-ENTMCNC: 29.6 GM/DL — LOW (ref 32–36)
MCV RBC AUTO: 93.5 FL — SIGNIFICANT CHANGE UP (ref 80–100)
METHOD TYPE: SIGNIFICANT CHANGE UP
MONOCYTES # BLD AUTO: 1.2 K/UL — HIGH (ref 0–0.9)
MONOCYTES NFR BLD AUTO: 9.1 % — SIGNIFICANT CHANGE UP (ref 2–14)
NEUTROPHILS # BLD AUTO: 11.01 K/UL — HIGH (ref 1.8–7.4)
NEUTROPHILS NFR BLD AUTO: 83.5 % — HIGH (ref 43–77)
NRBC # BLD: 0 /100 WBCS — SIGNIFICANT CHANGE UP
NRBC # FLD: 0.05 K/UL — HIGH
ORGANISM # SPEC MICROSCOPIC CNT: SIGNIFICANT CHANGE UP
PHOSPHATE SERPL-MCNC: 4.1 MG/DL — SIGNIFICANT CHANGE UP (ref 2.5–4.5)
PLATELET # BLD AUTO: 221 K/UL — SIGNIFICANT CHANGE UP (ref 150–400)
POTASSIUM SERPL-MCNC: 3.7 MMOL/L — SIGNIFICANT CHANGE UP (ref 3.5–5.3)
POTASSIUM SERPL-SCNC: 3.7 MMOL/L — SIGNIFICANT CHANGE UP (ref 3.5–5.3)
PROT SERPL-MCNC: 5.8 G/DL — LOW (ref 6–8.3)
RBC # BLD: 2.6 M/UL — LOW (ref 4.2–5.8)
RBC # FLD: 19.7 % — HIGH (ref 10.3–14.5)
SODIUM SERPL-SCNC: 136 MMOL/L — SIGNIFICANT CHANGE UP (ref 135–145)
SPECIMEN SOURCE: SIGNIFICANT CHANGE UP
WBC # BLD: 13.2 K/UL — HIGH (ref 3.8–10.5)
WBC # FLD AUTO: 13.2 K/UL — HIGH (ref 3.8–10.5)

## 2021-07-25 PROCEDURE — 99291 CRITICAL CARE FIRST HOUR: CPT

## 2021-07-25 RX ORDER — VANCOMYCIN HCL 1 G
750 VIAL (EA) INTRAVENOUS ONCE
Refills: 0 | Status: COMPLETED | OUTPATIENT
Start: 2021-07-25 | End: 2021-07-26

## 2021-07-25 RX ADMIN — Medication 1000 MILLIGRAM(S): at 07:32

## 2021-07-25 RX ADMIN — PANTOPRAZOLE SODIUM 40 MILLIGRAM(S): 20 TABLET, DELAYED RELEASE ORAL at 05:03

## 2021-07-25 RX ADMIN — Medication 2: at 12:30

## 2021-07-25 RX ADMIN — CEFEPIME 100 MILLIGRAM(S): 1 INJECTION, POWDER, FOR SOLUTION INTRAMUSCULAR; INTRAVENOUS at 11:40

## 2021-07-25 RX ADMIN — Medication 1: at 23:38

## 2021-07-25 RX ADMIN — ATORVASTATIN CALCIUM 40 MILLIGRAM(S): 80 TABLET, FILM COATED ORAL at 23:40

## 2021-07-25 RX ADMIN — Medication 1: at 05:07

## 2021-07-25 RX ADMIN — Medication 30 MILLIGRAM(S): at 23:40

## 2021-07-25 RX ADMIN — Medication 8.08 MICROGRAM(S)/KG/MIN: at 08:05

## 2021-07-25 RX ADMIN — Medication 30 MILLIGRAM(S): at 05:01

## 2021-07-25 RX ADMIN — MIDODRINE HYDROCHLORIDE 20 MILLIGRAM(S): 2.5 TABLET ORAL at 05:02

## 2021-07-25 RX ADMIN — MIDODRINE HYDROCHLORIDE 20 MILLIGRAM(S): 2.5 TABLET ORAL at 23:40

## 2021-07-25 RX ADMIN — CHLORHEXIDINE GLUCONATE 1 APPLICATION(S): 213 SOLUTION TOPICAL at 11:41

## 2021-07-25 NOTE — PROGRESS NOTE ADULT - ATTENDING COMMENTS
70 M with history of CAD s/p CABG, ESRD on HD, L pleural effusion, paroxysmal AF on A/C presented initially with chest pain and concern for GIB to [facility]. Transferred here 7/3 for further management. Course complicated by development of septic shock due to MRSA and serratia bacteremia requiring vasopressor support. Chest tube was placed 7/8 and removed 7/12 for loculated pleural effusion. Course further c/b cardiac arrest 7/13 and acute hypoxemic respiratory failure. 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 Was extubated 7/24. Appears more lethargic today compared to yesterday. Will check ABG to check for CO2 retention. Anticoagulation on hold for now due to development of right groin hematoma. Hgb slightly downtrended. Continue antibiotics. Follow up with ID. Continue HD as per nephrology. Will attempt to titrate off norepinephrine. Prognosis guarded.

## 2021-07-25 NOTE — PROGRESS NOTE ADULT - SUBJECTIVE AND OBJECTIVE BOX
Date of Service: 07-25-21 @ 12:41    Patient is a 70y old  Male who presents with a chief complaint of Chest pain and GIB (25 Jul 2021 09:10)      Any change in ROS: extubated but lethargic today :   ABG being done     MEDICATIONS  (STANDING):  atorvastatin 40 milliGRAM(s) Oral at bedtime  cefepime   IVPB 1000 milliGRAM(s) IV Intermittent daily  chlorhexidine 2% Cloths 1 Application(s) Topical daily  dextrose 40% Gel 15 Gram(s) Oral once  dextrose 40% Gel 15 Gram(s) Oral once  dextrose 5%. 1000 milliLiter(s) (50 mL/Hr) IV Continuous <Continuous>  dextrose 5%. 1000 milliLiter(s) (100 mL/Hr) IV Continuous <Continuous>  dextrose 50% Injectable 25 Gram(s) IV Push once  dextrose 50% Injectable 12.5 Gram(s) IV Push once  dextrose 50% Injectable 25 Gram(s) IV Push once  diltiazem    Tablet 30 milliGRAM(s) Oral four times a day  epoetin danilo-epbx (RETACRIT) Injectable 88342 Unit(s) IV Push <User Schedule>  glucagon  Injectable 1 milliGRAM(s) IntraMuscular once  insulin lispro (ADMELOG) corrective regimen sliding scale   SubCutaneous every 6 hours  midodrine 20 milliGRAM(s) Oral every 8 hours  norepinephrine Infusion 0.05 MICROgram(s)/kG/Min (8.08 mL/Hr) IV Continuous <Continuous>  pantoprazole    Tablet 40 milliGRAM(s) Oral two times a day  vancomycin  IVPB 750 milliGRAM(s) IV Intermittent <User Schedule>    MEDICATIONS  (PRN):  acetaminophen   Tablet .. 1000 milliGRAM(s) Oral every 6 hours PRN Temp greater or equal to 38C (100.4F), Mild Pain (1 - 3), Moderate Pain (4 - 6)  calamine/zinc oxide Lotion 1 Application(s) Topical three times a day PRN Itching    Vital Signs Last 24 Hrs  T(C): 37.3 (25 Jul 2021 08:00), Max: 37.3 (25 Jul 2021 04:00)  T(F): 99.2 (25 Jul 2021 08:00), Max: 99.2 (25 Jul 2021 08:00)  HR: 90 (25 Jul 2021 11:00) (77 - 108)  BP: 112/70 (25 Jul 2021 11:00) (57/26 - 140/122)  BP(mean): 78 (25 Jul 2021 11:00) (34 - 125)  RR: 27 (25 Jul 2021 11:00) (16 - 35)  SpO2: 100% (25 Jul 2021 11:00) (88% - 100%)    I&O's Summary    24 Jul 2021 07:01  -  25 Jul 2021 07:00  --------------------------------------------------------  IN: 1439.4 mL / OUT: 2700 mL / NET: -1260.6 mL          Physical Exam:   GENERAL: NAD, well-groomed, well-developed  HEENT: KATHY/   Atraumatic, Normocephalic  ENMT: No tonsillar erythema, exudates, or enlargement; Moist mucous membranes, Good dentition, No lesions  NECK: Supple, No JVD, Normal thyroid  CHEST/LUNG: no wheezing  CVS: Regular rate and rhythm; No murmurs, rubs, or gallops  GI: : Soft, Nontender, Nondistended; Bowel sounds present  NERVOUS SYSTEM:  lethargic andnot in any resp distress:   EXTREMITIES:  mild edema  LYMPH: No lymphadenopathy noted  SKIN: No rashes or lesions  ENDOCRINOLOGY: No Thyromegaly  PSYCH: lethargic    Labs:                              7.2    13.20 )-----------( 221      ( 25 Jul 2021 04:09 )             24.3                         7.8    16.34 )-----------( 262      ( 24 Jul 2021 03:25 )             26.5                         7.9    22.20 )-----------( 398      ( 23 Jul 2021 01:31 )             26.0                         7.8    20.86 )-----------( 341      ( 22 Jul 2021 15:31 )             25.6                         7.7    20.00 )-----------( 312      ( 22 Jul 2021 06:52 )             25.2                         6.6    14.45 )-----------( 251      ( 22 Jul 2021 02:39 )             22.1                         7.1    14.58 )-----------( 275      ( 21 Jul 2021 18:08 )             24.0     07-25    136  |  97<L>  |  26<H>  ----------------------------<  170<H>  3.7   |  26  |  4.13<H>  07-24    134<L>  |  92<L>  |  40<H>  ----------------------------<  190<H>  4.9   |  25  |  5.33<H>  07-23    134<L>  |  93<L>  |  27<H>  ----------------------------<  127<H>  4.2   |  22  |  4.11<H>  07-22    134<L>  |  94<L>  |  42<H>  ----------------------------<  99  4.6   |  22  |  5.90<H>    Ca    9.1      25 Jul 2021 04:09  Ca    9.0      24 Jul 2021 03:25  Phos  4.1     07-25  Phos  6.0     07-24  Mg     2.00     07-25  Mg     2.10     07-24    TPro  5.8<L>  /  Alb  2.2<L>  /  TBili  1.4<H>  /  DBili  x   /  AST  51<H>  /  ALT  19  /  AlkPhos  219<H>  07-25  TPro  5.8<L>  /  Alb  2.2<L>  /  TBili  1.3<H>  /  DBili  x   /  AST  78<H>  /  ALT  17  /  AlkPhos  211<H>  07-24  TPro  5.8<L>  /  Alb  2.6<L>  /  TBili  1.1  /  DBili  x   /  AST  42<H>  /  ALT  9   /  AlkPhos  141<H>  07-23  TPro  5.1<L>  /  Alb  2.3<L>  /  TBili  0.8  /  DBili  x   /  AST  33  /  ALT  8   /  AlkPhos  115  07-22    CAPILLARY BLOOD GLUCOSE      POCT Blood Glucose.: 213 mg/dL (25 Jul 2021 12:16)  POCT Blood Glucose.: 187 mg/dL (25 Jul 2021 05:05)  POCT Blood Glucose.: 276 mg/dL (24 Jul 2021 17:58)      LIVER FUNCTIONS - ( 25 Jul 2021 04:09 )  Alb: 2.2 g/dL / Pro: 5.8 g/dL / ALK PHOS: 219 U/L / ALT: 19 U/L / AST: 51 U/L / GGT: x           PTT - ( 25 Jul 2021 04:09 )  PTT:33.8 sec          RECENT CULTURES:  07-22 @ 17:28 .Blood Blood   PCR    Growth in aerobic bottle: Gram Negative Rods    Blood Culture PCR  Serratia marcescens  Blood Culture PCR     Growth in aerobic bottle: Serratia marcescens  ***Blood Panel PCR results on this specimen are available  approximately 3 hours after the Gram stain result.***  Gram stain, PCR, and/or culture results may not always  correspond due to difference inmethodologies.  ************************************************************  This PCR assay was performed by multiplex PCR. This  Assay tests for 66 bacterial and resistance gene targets.  Please refer to the [facility] test directory  [email or web address] for details.      ra< from: CT Angio Lower Extremity w/ IV Cont, Bilateral (07.21.21 @ 13:36) >  Oral Contrast: NONE  Complications: None reported at time of study completion    PROCEDURE:  Initially, nonenhanced CT was obtained through the calves. Then, following the rapid administration of intravenous contrast, CT angiography was performed through the abdomen, pelvis, and lower extremities down to the toes.  Delayed images through the calves were also obtained. Sagittal and coronal reformats as well as 3D reconstructions were performed.    FINDINGS:    CENTRAL ARTERIAL SYSTEM:    Marked atherosclerotic calcifications of the aortoiliac system and of the aortic branch vessels. The branch vessels are patent.    RIGHT LOWER EXTREMITY: Small knee joint effusion, marked subcutaneous edema. Surgical clips medially in the right leg. A 10.5 cm hematoma with fluid and hemorrhage level within the adductor muscles. No evidence of active extravasation.    Multilevel mildto moderate stenosis of the femoral and popliteal arteries. The arteries of the lower leg are not well evaluated due to extensive calcifications. Tibioperoneal trunk is patent.    LEFT LOWER EXTREMITY: Small knee joint effusion, marked subcutaneous edema.    Multilevel mild to moderate stenosis of the femoral and popliteal arteries. The arteries of the lower leg are not well evaluated due to extensive calcifications. Tibioperoneal trunk is patent. Stent in the distal femoral artery.    ADDITIONALFINDINGS: Reflux of contrast into distended IVC and hepatic veins. Enteric catheter in mid gastric lumen. Marked anasarca. Small-to-moderate ascites. Atrophic kidneys.    IMPRESSION:  A moderate-sized hematoma in the right adductor muscle group. No active extravasation on arterial phase.  Multilevel mild to moderate stenosis of the femoral and popliteal arteries bilaterally.        --- End of Report ---              MIKE NOLAN MD; Attending Radiologist  This document has been electronically signed. Jul 21 2021  2:16PM    < end of copied text >      RESPIRATORY CULTURES:    < from: TRUMAN w/o TTE (w/3D Echo) (07.19.21 @ 09:02) >  ------------------------------------------------------------------------  CONCLUSIONS:  1. A large (about  1.3 cm X 0.3 cm),  mobile echodensity is  visualized attached to the atrial aspect of the medial (P3)  scallop of the posterior mitral valve leaflet and is  consistent with a vegetation.  In addition, a portion of  the anterior mitral leaflet is markedly thickened and this  may represent sessile vegetation. Moderate mitral  regurgitation.  Vena contracta width about  0.4 - 0.5 cm.  2. Calcified trileaflet aortic valve with normal opening.  No vegetations seen in association with the aortic valve.  No aortic valve regurgitation seen.  3. Normal aortic root.  Mild non-mobile atherosclerotic  plaque in the aortic arch.  Severe, non-mobile  atherosclerotic plaque in the descending thoracic aorta.  4. Normal left atrium.  No left atrial or left atrial  appendage thrombus.  5. Mild segmental left ventricular systolic dysfunction.  Hypokinesis of the basal to mid inferior wall.  6. Right ventricular enlargement with decreased right  ventricular systolic function.  7. Normal tricuspid valve.  No vegetations seen in  association with the tricuspid valve.  Moderate-severe  tricuspid regurgitation.  8. Agitated saline injection and color flow Doppler  demonstrate evidence of a patent foramen ovale.  ------------------------------------------------------------------------  Confirmed on  7/19/2021 - 18:54:15 by Osvaldo Bertrand M.D.,  [facility], [facility]  ------------------------------------------------------------------------    < end of copied text >        Studies  Chest X-RAY  CT SCAN Chest   Venous Dopplers: LE:   CT Abdomen  Others

## 2021-07-25 NOTE — PROGRESS NOTE ADULT - ASSESSMENT
69 yo M with PMhx of CAD s/p CABG (course at that time complicated by pleural effusion requiring chest tubes), ESRD on HD (MWF), DMII, HTN, afib on coumadin, and anemia presented from [facility], [place] for evaluation of chest pain and GIB.       left sided pleural effusion: loculated and chronic:   CAD:  CABG:  ESRD:   DM:  HTN:  A fibrillation:   GI Bleed    7/7:    left sided pleural effusion: loculated and chronic: he has chr loculated pleural effusion on left side: He had chest tube placement in last June 2020 at another hospital: no chemistry is available but he had negative cytology at that time: This time the effusion seems slightly worse and has loculated effusion with pleural thickening: heis not SOB andhasbeen on roomair: I think , it at all, if this effusion needs to be dealt with : it is probably vats: will contact thoracic surgery : etiology not very clear as there is no previous chemistry: coult be exudative or transudatve: he is on HD and now it is a chr christiano effusion   CAD: cont per cards  CABG: cont current meds  ESRD: on HD   DM: monitor and control  HTN: controlled  A fibrillation: off ac:   GI Bleed: per gi :  DW pt and t [id number]    7/8:    left sided pleural effusion:: fever: s/p left sided chest tube placement: cultures sent: however with pr criteria it seems transudative await serum LDH but pleural fluid LDH is low:  already started on broad spectrum antibiotics ID to see: await cultures:   CAD: cont per cards  CABG: cont current meds  ESRD: on HD   DM: monitor and control  HTN: controlled  A fibrillation: off ac:   GI Bleed: per gi :  DW pmd    7/9:    left sided pleural effusion:: fever: s/p left sided chest tube placement: cultures sent: however with pr criteria it seems borderline transudative and transudative by LDH criteria: the vulutres ahve been negative so far:  already started on broad spectrum antibiotics ID to see: await cultures: ? source of spesis  CAD: cont per cards  CABG: cont current meds  ESRD: on HD   DM: monitor and control  HTN: in shock: now on vasopressors:   A fibrillation: off ac:   GI Bleed: per gi :  DW pmd  micu team    7/11:      left sided pleural effusion:: fever: s/p left sided chest tube placement: cultures sent: however with pr criteria it seems borderline transudative and transudative by LDH criteria: the cultures have been negative so far:  already started on broad spectrum antibiotics : Has MSSA bactermia  CAD: cont per cards: still on vasopressors: wean as tolerated ? needs molly ro tule out IE : would defer to cards:   CABG: cont current meds  ESRD: on HD   DM: monitor and control  HTN: in shock: now on vasopressors:   A fibrillation: off ac:   GI Bleed: per gi :  DW pmd  micu team    7/12:    left sided pleural effusion:: fever: s/p left sided chest tube placement:- now removed: cultures sent: however with pr criteria it seems borderline transudative and transudative by LDH criteria: the cultures have been negative so far:  already started on broad spectrum antibiotics : Has MRSA bactermia: vanco restarted yesterday ? echo/molly  Ac hypercarbic resp fialure: needs to be watched closelyin MICU : needs bipap may need intubation" At this time he is alert and awake: and responds to simple questions:    CAD: cont per cards: still on vasopressors: wean as tolerated ? needs molly ro tule out IE : would defer to cards:   CABG: cont current meds  ESRD: on HD   DM: monitor and control  HTN: in shock: now on vasopressors:   A fibrillation: off ac:   GI Bleed: per gi :  DW pmd  cards and bedstaff    7/13:  left sided pleural effusion:: fever: s/p left sided chest tube placement:- now removed: cultures sent: however with pr criteria it seems borderline transudative and transudative by LDH criteria: the cultures have been negative so far:  already started on broad spectrum antibiotics : Has MRSA bacteremia vanco restarted yesterday ? echo/molly  Ac hypercarbic resp fialure: now intubated s/p acls protocol last night   CAD: cont per cards: still on vasopressors: wean as tolerated ? needs molly ro tule out IE : would defer to cards:   CABG: cont current meds  ESRD: on HD   DM: monitor and control  HTN: in shock: now on vasopressors:   A fibrillation: off ac:   GI Bleed: per gi :  DELLA  staff  pt is critically ill now:     7/14:  left sided pleural effusion:: fever: s/p left sided chest tube placement:- now removed: cultures sent: however with pr criteria it seems borderline transudative and transudative by LDH criteria: the cultures have been negative so far:  already started on broad spectrum antibiotics : Has MRSA bacteremia vanco restarted yesterday now for  echo/molly  Ac hypercarbic resp fialure: now intubated s/p acls protocol last night -s/p cardiac arrest: ROSC 10 mts:   CAD: cont per cards: still on vasopressors: wean as tolerated ? needs molly ro tule out IE : would defer to cards: Cotn antbitioc  CABG: cont current meds  ESRD: on HD   DM: monitor and control  HTN: in shock: now on vasopressors:   A fibrillation: off ac:   GI Bleed: per gi :  DW  staff  pt is critically ill now: dw cards    7/15:    left sided pleural effusion:: transudative pl effusion with no with: certainly not the source for MRSA in blood:   Ac hypercarbic resp fialure: now intubated s/p acls protocol last night -s/p cardiac arrest: ROSC 10 mts:   CAD: cont per cards: still on vasopressors: wean as tolerated ? needs molly ro tule out IE : would defer to cards: Cotn antbitioc  CABG: cont current meds  ESRD: on HD   DM: monitor and control  HTN: in shock: now on vasopressors:   A fibrillation: on ac:   GI Bleed: per gi :  DW  staff  pt is critically ill now:     7/16"  left sided pleural effusion:: transudative pl effusion with no with: certainly not the source for MRSA in blood: ?source for MRSA:   Ac hypercarbic resp fialure: now intubated s/p acls protocol last night -s/p cardiac arrest: ROSC 10 mts: he seems to respond off sedation:per MICU note:   CAD: cont per cards: still on vasopressors: wean as tolerated ? needs molly ro tule out IE : would defer to cards: Cotn antbitioc  CABG: cont current meds  ESRD: on HD   DM: monitor and control  HTN: in shock: now on vasopressors:   A fibrillation: on ac:   GI Bleed: per gi :  DW  staff: PMD  pt is critically ill now:      7/17:    left sided pleural effusion:: transudative pl effusion with no with: certainly not the source for MRSA in blood: ?source for MRSA: for molly on monday  Ac hypercarbic resp fialure: now intubated s/p acls protocol last night -s/p cardiac arrest: ROSC 10 mts:   CAD: cont per cards: still on vasopressors: wean as tolerated ? needs molly ro tule out IE : would defer to cards: Cont antibiotics:   CABG: cont current meds  ESRD: on HD   DM: monitor and control  HTN: in shock: now on vasopressors:   A fibrillation: on ac:   GI Bleed: per gi :  DW  staff: PMD  pt is critically ill now:    7/18:    left sided pleural effusion:: transudative pl effusion with no with: certainly not the source for MRSA in blood: ?source for MRSA: for molly on monday: he is off sedation   Ac hypercarbic resp fialure: now intubated s/p acls protocol last night -s/p cardiac arrest: ROSC 10 mts:   Acute cva: noted on ct scan head: neurology following: already on heparin   CAD: cont per cards: still on vasopressors: wean as tolerated ? needs molly ro tule out IE : would defer to cards: Cont antibiotics:   CABG: cont current meds  ESRD: on HD   DM: monitor and control  HTN: in shock: now on vasopressors:   A fibrillation: on ac:   GI Bleed: per gi :  pt is critically ill now:    7/19:      left sided pleural effusion:: transudative pl effusion with no with: certainly not the source for MRSA in blood: ?source for MRSA: for molly today: he is off sedation but still on pressors:   Ac hypercarbic resp fialure: now intubated s/p acls protocol last night -s/p cardiac arrest: ROSC 10 mts:   Acute cva: noted on ct scan head: neurology following: already on heparin   CAD: cont per cards: still on vasopressors: wean as tolerated ? needs molly ro tule out IE : would defer to cards: Cont antibiotics: for molly today  CABG: cont current meds  ESRD: on HD   DM: monitor and control  HTN: in shock: now on vasopressors:   A fibrillation: on ac:   GI Bleed: per gi :  pt is critically ill now:    7/20  Acute Hypercarbic Respiratory Failure  -S/p RRT, tx to MICU 7/8, intubated 7/13 s/p cardiac arrest w/ ROSC  -CPAP trials, ventilator management per MICU team  -Pulmonary toileting, suction PRN   L pleural effusion   -Loculated L pleural effusion on CT chest, s/p CT placement by thoracic 7/8  -Transudative effusion, Cx negative   -CT since removed   -Repeat CT chest with small b/l partially loculated effusions, adjacent atelectasis  Bacteremia  -BC 7/9 MRSA+  -ABX per ID  -Repeat BC NGTD   -F/u cardiology recs regarding MOLLY   CAD (s/p CABG) - per cards   ESRD - HD per renal   GI bleed - GI recs noted     7/21  Acute Hypercarbic Respiratory Failure  -S/p RRT, tx to MICU 7/8, intubated 7/13 s/p cardiac arrest w/ ROSC  -CPAP trials, ventilator management per MICU team  -Pulmonary toileting, suction PRN   L pleural effusion   -Loculated L pleural effusion on CT chest, s/p CT placement by thoracic 7/8  -Transudative effusion, Cx negative   -CT since removed   -Repeat CT chest with small b/l partially loculated effusions, adjacent atelectasis  Bacteremia  -BC 7/9 MRSA+  -ABX per ID  -Repeat BC NGTD   -MOLLY now with MV vegetation per cards  -Pending CT lumbar spine   -F/u CT surgery recs  CAD (s/p CABG) - per cards   ESRD - HD per renal   GI bleed - GI recs noted     7/22  Acute Hypercarbic Respiratory Failure  --S/p RRT, tx to MICU 7/8, intubated 7/13 s/p cardiac arrest w/ ROSC-10 mts  --CPAP trials, ventilator management per MICU team  --Pulmonary toileting, suction PRN   L pleural effusion   --Loculated L pleural effusion on CT chest, s/p CT placement by thoracic 7/8  --Transudative effusion, Cx negative   --CT since removed   --Repeat CT chest with small b/l partially loculated effusions, adjacent atelectasis  Bacteremia  --BC 7/9 MRSA+, repeat BC NGTD   --MOLLY now with MV vegetation, not a candidate for surgery per CTS  --CT lumbar spine with no evidence of discitis   --ABX per ID  DVT prophylaxis  --AC held due to drop in H/H  --Concern for R thigh hematoma, vascular recs noted   CAD (s/p CABG) - per cards   ESRD - HD per renal   GI bleed - resolved, GI recs noted   Care per MICU team     7/23  Acute Hypercarbic Respiratory Failure  --S/p RRT, tx to MICU 7/8, intubated 7/13 s/p cardiac arrest w/ ROSC-10 mts  --Tolerating CPAP trials, plan to extubate per RN   --Ventilator management per MICU team  --Pulmonary toileting, suction PRN   L pleural effusion   --Loculated L pleural effusion on CT chest, s/p CT placement by thoracic 7/8  --Transudative effusion, Cx negative   --CT since removed   --Repeat CT chest with small b/l partially loculated effusions, adjacent atelectasis  Bacteremia  --BC 7/9 MRSA+, repeat BC NGTD   --MOLLY with MV vegetation, not a candidate for surgery per CTS  --CT lumbar spine with no evidence of discitis   --ABX per ID  DVT prophylaxis  --AC held due to drop in H/H  --Concern for R thigh hematoma, vascular recs noted   CAD (s/p CABG) - per cards   ESRD - HD per renal   GI bleed - resolved, GI recs noted   Care per MICU team     7/24:    Acute Hypercarbic Respiratory Failure : S/p RRT, tx to MICU 7/8, intubated 7/13 s/p cardiac arrest w/ ROSC-10 mts extubated now: alert and awake:   L pleural effusion : Loculated L pleural effusion on CT chest, s/p CT placement by thoracic 7/8: Transudative effusion, Cx negative : CT since removed : Repeat CT chest with small b/l partially loculated effusions, adjacent atelectasis  Bacteremia: -serratia: now: ij new blood cultures cont antibiotics : MOLLY with MV vegetation, not a candidate for surgery per CTS  --CT lumbar spine with no evidence of discitis   --ABX per ID  DVT prophylaxis : has hematoma in adductionr muscles: AC held due to drop in H/H  CAD (s/p CABG) - per cards   ESRD - HD per renal   GI bleed - resolved, GI recs noted   Care per MICU team : della resident today     7/25:    Acute Hypercarbic Respiratory Failure : S/p RRT, tx to MICU 7/8, intubated 7/13 s/p cardiac arrest w/ ROSC-10 mts extubated now: todays he is lethargic abg being done:   L pleural effusion : Loculated L pleural effusion on CT chest, s/p CT placement by thoracic 7/8: Transudative effusion, Cx negative : CT since removed : Repeat CT chest with small b/l partially loculated effusions, adjacent atelectasis  Bacteremia: -serratia: now: ij new blood cultures cont antibiotics : MOLLY with MV vegetation, not a candidate for surgery per CTS: CT lumbar spine with no evidence of discitis : ABX per ID  DVT prophylaxis : has hematoma in adductor muscles: AC held due to drop in H/H  CAD (s/p CABG) - per cards   ESRD - HD per renal   GI bleed - resolved, GI recs noted   Care per MICU team : della resident today too!

## 2021-07-25 NOTE — PROGRESS NOTE ADULT - ASSESSMENT
Assessment and Plan: 	  69 yo M with PMhx of CAD s/p CABG (course at that time complicated by pleural effusion requiring chest tubes), ESRD on HD (MWF), DMII, HTN, afib on coumadin, and anemia transferred from [facility] for evaluation of chest pain after HD and GIB s/p 2U PRBC transfusion and aflutter/afib with RVR managed with metoprolol. Admitted to MICU for hypotension dependent on pressors likely 2/2 to sepsis. In MICU, patient had a cardiac arrest on 7/13 requiring CPR, 3x epi, and 2x shock (for VTach). Course now complicated by recent drop in hgb requiring pRBCs. CT of leg shows hematoma on right adductor.    #Neuro  - Off of sedation. A&Ox3  - CT head shows foci of low density in the R anterolateral frontal lobe and L posterior temporal lobe concerning for acute infarcts.  - Mechanism likely cardioembolic in setting of patient with known atrial fibrillation with cardiac arrest vs septic emboli  - per neurology, can consider MRI brain w/o contrast. However, unlikely to  since patient has known atrial fibrillation and will need to be continued of full dose anticoagulation. Currently holding given recent bleeding  - Avoid hypotension, keep -180 for neuroprotection  - c/w Atorvastatin 40MG QHS for secondary stroke prevention.    #Cardiovascular  - Cardiac arrest: Patient had asystole night of 7/13 and CPR was started. S/p epi x3 and shock x2 with return to NSR. Cards is following  - TTE on 7/13 showed Mild mitral regurgitation with biventricular dysfunction of LV and RV systolic function. Endocardium was not well visualized, Hypokinesis of the basal inferior wall and basal inferoseptum.  - TRUMAN on 7/19 showed a large echodensity consisted with endocarditis and PFO  - Cardiology spoke with CTS, no surgery at this time. (Dr. Herzog)  - c/w Levophed wean as tolerated. Currently stable on 0.02  - c/  midodrine TID to help assist weaning off of pressors   - BP goal is >100 systolic  - troponinemia likely secondary to demand ischemia and ESRD     A. Fib  - on coumadin at home,  - holding heparin in setting of recent drop in hgb  - From neurology stand point, heparin is sufficient for now. Will need to be switched to DOAC in the future if kidney function allows  - 7/11: tried to transition heparin gtt to eliquis but pt is not a candidate due to his acute MIGUEL; back on heparin gtt  - c/ diltiazem for rate control    #Respiratory  - Loculated Pleural effusions: chest tube removed  - pleural fluid cx: (-); transudative.  - Currently O2 sat 99% on 6L NC. Tried to wean down to 4L and patient desatted, will keep 6L at this time  - CXR s/p chest tube removal showed no evidence of atelectasis. Demonstrated loculated left pleural effusion decreased in size and new hazy right lower lung opacity which could be due to a small layering right pleural effusion with associated passive atelectasis, atelectasis of other cause, and/or pneumonia.  - abx as below    #GI/Nutrition:  - NPO with tube feed. Holding at this time in preparation for extubation  - Was c/o abdominal pain on presentation, bedside POCUS didn't show ascites. No abdominal pain at this time. Continue to monitor.   - negative occult bleeding, hgb stable  - Per GI, recent decrease in hgb less likely to be a GIB, appreciate recs  - FOBT negative  - Zofran PRN for Nausea.    #/Renal  - ESRD: HD on MWF. Most recent Scr 4.6. Potassium is stable.   - Last dialysis session 7/24. Will likely not receive dialysis today.   - Recommend removing 2-3L of fluids as patient is net positive for I&Os. Discussed with nephro  - Electrolytes stable  - c/w Epo 20k tiw with HD  - Scotal US demonstrates swelling in the scrotum, improving with dialysis yesterday. likely in setting of fluid overload. Manage as above      #Skin  - HD Access: fistula in left upper arm  - Improving erythematous and tender on right wrist. Continue to monitor. RUE US neg for thrombus  - US of fistula showed patent fistula with no abscess  - Arrow is patent in right upper extremity.    #ID  - S aureus PCR: (+),  MRSA PCR negative  - BCx on 7/7 now growing MRSA: restarted on vanc (7/11- )  - Vanc 7/23 was 24, dose held, plan for after dialysis saturday 7/24  - Blood Cx 7/22 growing gram negative rods, Serratia on Cefepime (7/24 - )    #Endocrine  - DMII: c/w Lispro SS. Monitor glucose.      #Hematologic/DVT ppx  - monitor PTT and cbc q12  - Will hold heparin in setting of recent decrease in hgb. No transfusion needed o/n  - Patient complaining of right thigh pain. CTA demonstrated A moderate-sized hematoma in the right adductor muscle group.  - Contacted surgery regarding the hematoma, no surgery at this time. c/ with ACE wrap at this time.  - PRN dilaudid for pain control  - Leukocytosis as above    #Ethics  - full code as per wife- full code as per wife Assessment and Plan: 	  71 yo M with PMhx of CAD s/p CABG (course at that time complicated by pleural effusion requiring chest tubes), ESRD on HD (MWF), DMII, HTN, afib on coumadin, and anemia transferred from [facility] for evaluation of chest pain after HD and GIB s/p 2U PRBC transfusion and aflutter/afib with RVR managed with metoprolol. Admitted to MICU for hypotension dependent on pressors likely 2/2 to sepsis. In MICU, patient had a cardiac arrest on 7/13 requiring CPR, 3x epi, and 2x shock (for VTach). Course now complicated by recent drop in hgb requiring pRBCs. CT of leg shows hematoma on right adductor.    #Neuro  - Off of sedation. A&Ox3  - CT head shows foci of low density in the R anterolateral frontal lobe and L posterior temporal lobe concerning for acute infarcts.  - Mechanism likely cardioembolic in setting of patient with known atrial fibrillation with cardiac arrest vs septic emboli  - per neurology, can consider MRI brain w/o contrast. However, unlikely to  since patient has known atrial fibrillation and will need to be continued of full dose anticoagulation. Currently holding given recent bleeding  - Avoid hypotension, keep -180 for neuroprotection  - c/w Atorvastatin 40MG QHS for secondary stroke prevention.  - more lethargic during team rounds, will obtain ABG to evaluate change in mental status    #Cardiovascular  - Cardiac arrest: Patient had asystole night of 7/13 and CPR was started. S/p epi x3 and shock x2 with return to NSR. Cards is following  - TTE on 7/13 showed Mild mitral regurgitation with biventricular dysfunction of LV and RV systolic function. Endocardium was not well visualized, Hypokinesis of the basal inferior wall and basal inferoseptum.  - TRUMAN on 7/19 showed a large echodensity consisted with endocarditis and PFO  - Cardiology spoke with CTS, no surgery at this time. (Dr. Herzog)  - c/w Levophed wean as tolerated. Currently stable on 0.02  - c/  midodrine TID to help assist weaning off of pressors   - BP goal is >100 systolic  - troponinemia likely secondary to demand ischemia and ESRD     A. Fib  - on coumadin at home,  - holding heparin in setting of recent drop in hgb  - From neurology stand point, heparin is sufficient for now. Will need to be switched to DOAC in the future if kidney function allows  - 7/11: tried to transition heparin gtt to eliquis but pt is not a candidate due to his acute MIGUEL; back on heparin gtt  - c/ diltiazem for rate control    #Respiratory  - Loculated Pleural effusions: chest tube removed  - pleural fluid cx: (-); transudative.  - Currently O2 sat 99% on 6L NC. Tried to wean down to 4L and patient desatted, will keep 6L at this time  - CXR s/p chest tube removal showed no evidence of atelectasis. Demonstrated loculated left pleural effusion decreased in size and new hazy right lower lung opacity which could be due to a small layering right pleural effusion with associated passive atelectasis, atelectasis of other cause, and/or pneumonia.  - abx as below    #GI/Nutrition:  - NPO with tube feed. Holding at this time in preparation for extubation  - Was c/o abdominal pain on presentation, bedside POCUS didn't show ascites. No abdominal pain at this time. Continue to monitor.   - negative occult bleeding, hgb stable  - Per GI, recent decrease in hgb less likely to be a GIB, appreciate recs  - FOBT negative  - Zofran PRN for Nausea.    #/Renal  - ESRD: HD on MWF. Most recent Scr 4.6. Potassium is stable.   - Last dialysis session 7/24. Will likely not receive dialysis today.   - Recommend removing 2-3L of fluids as patient is net positive for I&Os. Discussed with nephro  - Electrolytes stable  - c/w Epo 20k tiw with HD  - Scotal US demonstrates swelling in the scrotum, improving with dialysis yesterday. likely in setting of fluid overload. Manage as above      #Skin  - HD Access: fistula in left upper arm  - Improving erythematous and tender on right wrist. Continue to monitor. RUE US neg for thrombus  - US of fistula showed patent fistula with no abscess  - Arrow is patent in right upper extremity.    #ID  - S aureus PCR: (+),  MRSA PCR negative  - BCx on 7/7 now growing MRSA: restarted on vanc (7/11- )  - Vanc 7/23 was 24, dose held, plan for after dialysis saturday 7/24  - Blood Cx 7/22 growing gram negative rods, Serratia on Cefepime (7/24 - ).    #Endocrine  - DMII: c/w Lispro SS. Monitor glucose.      #Hematologic/DVT ppx  - monitor PTT and cbc q12  - Will hold heparin in setting of recent decrease in hgb. No transfusion needed o/n  - Patient complaining of right thigh pain. CTA demonstrated A moderate-sized hematoma in the right adductor muscle group.  - Contacted surgery regarding the hematoma, no surgery at this time. c/ with ACE wrap at this time.  - PRN dilaudid for pain control  - Leukocytosis as above    #Ethics  - full code as per wife- full code as per wife

## 2021-07-25 NOTE — PROGRESS NOTE ADULT - SUBJECTIVE AND OBJECTIVE BOX
Mainorfarhana Bowers  PGY-1 | Internal Medicine      INTERVAL HPI/OVERNIGHT EVENTS: No acute events O/N. BP stable o/n    SUBJECTIVE: Patient seen and examined at bedside. Patient awake and comfortable. Denies any complaints at this time.    CONSTITUTIONAL: No weakness, fevers or chills  EYES/ENT: No visual changes;  No vertigo or throat pain   NECK: No pain or stiffness  RESPIRATORY: No cough, wheezing, hemoptysis; No shortness of breath  CARDIOVASCULAR: No chest pain or palpitations  GASTROINTESTINAL: No abdominal or epigastric pain. No nausea, vomiting, or hematemesis; No diarrhea or constipation. No melena or hematochezia.  GENITOURINARY: No dysuria, frequency or hematuria  NEUROLOGICAL: No numbness or weakness  SKIN: No itching, rashes    OBJECTIVE:    VITAL SIGNS:  ICU Vital Signs Last 24 Hrs  T(C): 37.3 (25 Jul 2021 04:00), Max: 37.3 (25 Jul 2021 04:00)  T(F): 99.1 (25 Jul 2021 04:00), Max: 99.1 (25 Jul 2021 04:00)  HR: 87 (25 Jul 2021 06:00) (77 - 116)  BP: 116/64 (25 Jul 2021 06:00) (57/26 - 183/80)  BP(mean): 75 (25 Jul 2021 06:00) (34 - 149)  ABP: --  ABP(mean): --  RR: 25 (25 Jul 2021 06:00) (16 - 35)  SpO2: 97% (25 Jul 2021 06:00) (53% - 100%)        07-24 @ 07:01  -  07-25 @ 07:00  --------------------------------------------------------  IN: 1439.4 mL / OUT: 2700 mL / NET: -1260.6 mL      CAPILLARY BLOOD GLUCOSE      POCT Blood Glucose.: 187 mg/dL (25 Jul 2021 05:05)      PHYSICAL EXAM:    General: NAD  HEENT: NC/AT; PERRL, clear conjunctiva  Neck: supple  Respiratory: CTA b/l  Cardiovascular: +S1/S2; RRR  Abdomen: soft, NT/ND; +BS x4  Extremities: WWP, 2+ peripheral pulses b/l; no LE edema  Skin: normal color and turgor; no rash  Neurological:    MEDICATIONS:  MEDICATIONS  (STANDING):  atorvastatin 40 milliGRAM(s) Oral at bedtime  cefepime   IVPB 1000 milliGRAM(s) IV Intermittent daily  chlorhexidine 2% Cloths 1 Application(s) Topical daily  dextrose 40% Gel 15 Gram(s) Oral once  dextrose 40% Gel 15 Gram(s) Oral once  dextrose 5%. 1000 milliLiter(s) (50 mL/Hr) IV Continuous <Continuous>  dextrose 5%. 1000 milliLiter(s) (100 mL/Hr) IV Continuous <Continuous>  dextrose 50% Injectable 25 Gram(s) IV Push once  dextrose 50% Injectable 12.5 Gram(s) IV Push once  dextrose 50% Injectable 25 Gram(s) IV Push once  diltiazem    Tablet 30 milliGRAM(s) Oral four times a day  epoetin danilo-epbx (RETACRIT) Injectable 22792 Unit(s) IV Push <User Schedule>  glucagon  Injectable 1 milliGRAM(s) IntraMuscular once  insulin lispro (ADMELOG) corrective regimen sliding scale   SubCutaneous every 6 hours  midodrine 20 milliGRAM(s) Oral every 8 hours  norepinephrine Infusion 0.05 MICROgram(s)/kG/Min (8.08 mL/Hr) IV Continuous <Continuous>  pantoprazole    Tablet 40 milliGRAM(s) Oral two times a day  vancomycin  IVPB 750 milliGRAM(s) IV Intermittent <User Schedule>    MEDICATIONS  (PRN):  acetaminophen   Tablet .. 1000 milliGRAM(s) Oral every 6 hours PRN Temp greater or equal to 38C (100.4F), Mild Pain (1 - 3), Moderate Pain (4 - 6)  calamine/zinc oxide Lotion 1 Application(s) Topical three times a day PRN Itching      ALLERGIES:  Allergies    lisinopril (Other)  opioid-like analgesics (Other)    Intolerances        LABS:                        7.2    13.20 )-----------( 221      ( 25 Jul 2021 04:09 )             24.3     07-25    136  |  97<L>  |  26<H>  ----------------------------<  170<H>  3.7   |  26  |  4.13<H>    Ca    9.1      25 Jul 2021 04:09  Phos  4.1     07-25  Mg     2.00     07-25    TPro  5.8<L>  /  Alb  2.2<L>  /  TBili  1.4<H>  /  DBili  x   /  AST  51<H>  /  ALT  19  /  AlkPhos  219<H>  07-25    PTT - ( 25 Jul 2021 04:09 )  PTT:33.8 sec      RADIOLOGY & ADDITIONAL TESTS: Reviewed. Mainorjoshua Bowers  PGY-1 | Internal Medicine      INTERVAL HPI/OVERNIGHT EVENTS: No acute events O/N. BP stable o/n    SUBJECTIVE: Patient seen and examined at bedside. Patient awake and comfortable. Denies any complaints at this time.     CONSTITUTIONAL: No weakness, fevers or chills  EYES/ENT: No visual changes;  No vertigo or throat pain   NECK: No pain or stiffness  RESPIRATORY: No cough, wheezing, hemoptysis; No shortness of breath  CARDIOVASCULAR: No chest pain or palpitations  GASTROINTESTINAL: No abdominal or epigastric pain. No nausea, vomiting, or hematemesis; No diarrhea or constipation. No melena or hematochezia.  GENITOURINARY: No dysuria, frequency or hematuria  NEUROLOGICAL: No numbness or weakness  SKIN: No itching, rashes    OBJECTIVE:    VITAL SIGNS:  ICU Vital Signs Last 24 Hrs  T(C): 37.3 (25 Jul 2021 04:00), Max: 37.3 (25 Jul 2021 04:00)  T(F): 99.1 (25 Jul 2021 04:00), Max: 99.1 (25 Jul 2021 04:00)  HR: 87 (25 Jul 2021 06:00) (77 - 116)  BP: 116/64 (25 Jul 2021 06:00) (57/26 - 183/80)  BP(mean): 75 (25 Jul 2021 06:00) (34 - 149)  RR: 25 (25 Jul 2021 06:00) (16 - 35)  SpO2: 97% (25 Jul 2021 06:00) (53% - 100%)        07-24 @ 07:01  -  07-25 @ 07:00  --------------------------------------------------------  IN: 1439.4 mL / OUT: 2700 mL / NET: -1260.6 mL      CAPILLARY BLOOD GLUCOSE      POCT Blood Glucose.: 187 mg/dL (25 Jul 2021 05:05)      PHYSICAL EXAM:    General: NAD  HEENT: NC/AT; PERRL, clear conjunctiva  Neck: supple  Respiratory: CTA b/l  Cardiovascular: +S1/S2; RRR  Abdomen: soft, NT/ND; +BS x4  Extremities: WWP, 2+ peripheral pulses left; no LE edema. Right LE pulses present on doppler  : testicular swelling now improved   Skin: normal color and turgor; no rash  Neuro: A&Ox3     MEDICATIONS:  MEDICATIONS  (STANDING):  atorvastatin 40 milliGRAM(s) Oral at bedtime  cefepime   IVPB 1000 milliGRAM(s) IV Intermittent daily  chlorhexidine 2% Cloths 1 Application(s) Topical daily  dextrose 40% Gel 15 Gram(s) Oral once  dextrose 40% Gel 15 Gram(s) Oral once  dextrose 5%. 1000 milliLiter(s) (50 mL/Hr) IV Continuous <Continuous>  dextrose 5%. 1000 milliLiter(s) (100 mL/Hr) IV Continuous <Continuous>  dextrose 50% Injectable 25 Gram(s) IV Push once  dextrose 50% Injectable 12.5 Gram(s) IV Push once  dextrose 50% Injectable 25 Gram(s) IV Push once  diltiazem    Tablet 30 milliGRAM(s) Oral four times a day  epoetin danilo-epbx (RETACRIT) Injectable 01890 Unit(s) IV Push <User Schedule>  glucagon  Injectable 1 milliGRAM(s) IntraMuscular once  insulin lispro (ADMELOG) corrective regimen sliding scale   SubCutaneous every 6 hours  midodrine 20 milliGRAM(s) Oral every 8 hours  norepinephrine Infusion 0.05 MICROgram(s)/kG/Min (8.08 mL/Hr) IV Continuous <Continuous>  pantoprazole    Tablet 40 milliGRAM(s) Oral two times a day  vancomycin  IVPB 750 milliGRAM(s) IV Intermittent <User Schedule>    MEDICATIONS  (PRN):  acetaminophen   Tablet .. 1000 milliGRAM(s) Oral every 6 hours PRN Temp greater or equal to 38C (100.4F), Mild Pain (1 - 3), Moderate Pain (4 - 6)  calamine/zinc oxide Lotion 1 Application(s) Topical three times a day PRN Itching      ALLERGIES:  Allergies    lisinopril (Other)  opioid-like analgesics (Other)    Intolerances        LABS:                        7.2    13.20 )-----------( 221      ( 25 Jul 2021 04:09 )             24.3     07-25    136  |  97<L>  |  26<H>  ----------------------------<  170<H>  3.7   |  26  |  4.13<H>    Ca    9.1      25 Jul 2021 04:09  Phos  4.1     07-25  Mg     2.00     07-25    TPro  5.8<L>  /  Alb  2.2<L>  /  TBili  1.4<H>  /  DBili  x   /  AST  51<H>  /  ALT  19  /  AlkPhos  219<H>  07-25    PTT - ( 25 Jul 2021 04:09 )  PTT:33.8 sec      RADIOLOGY & ADDITIONAL TESTS: Reviewed. Mainor Shamikaraphael  PGY-1 | Internal Medicine      INTERVAL HPI/OVERNIGHT EVENTS: No acute events O/N. BP stable o/n    SUBJECTIVE: Patient seen and examined at bedside. Patient awake and comfortable. Denies any complaints at this time. Patient was more lethargic during rounds with the team     CONSTITUTIONAL: No weakness, fevers or chills  EYES/ENT: No visual changes;  No vertigo or throat pain   NECK: No pain or stiffness  RESPIRATORY: No cough, wheezing, hemoptysis; No shortness of breath  CARDIOVASCULAR: No chest pain or palpitations  GASTROINTESTINAL: No abdominal or epigastric pain. No nausea, vomiting, or hematemesis; No diarrhea or constipation. No melena or hematochezia.  GENITOURINARY: No dysuria, frequency or hematuria  NEUROLOGICAL: No numbness or weakness  SKIN: No itching, rashes    OBJECTIVE:    VITAL SIGNS:  ICU Vital Signs Last 24 Hrs  T(C): 37.3 (25 Jul 2021 04:00), Max: 37.3 (25 Jul 2021 04:00)  T(F): 99.1 (25 Jul 2021 04:00), Max: 99.1 (25 Jul 2021 04:00)  HR: 87 (25 Jul 2021 06:00) (77 - 116)  BP: 116/64 (25 Jul 2021 06:00) (57/26 - 183/80)  BP(mean): 75 (25 Jul 2021 06:00) (34 - 149)  RR: 25 (25 Jul 2021 06:00) (16 - 35)  SpO2: 97% (25 Jul 2021 06:00) (53% - 100%)        07-24 @ 07:01  -  07-25 @ 07:00  --------------------------------------------------------  IN: 1439.4 mL / OUT: 2700 mL / NET: -1260.6 mL      CAPILLARY BLOOD GLUCOSE      POCT Blood Glucose.: 187 mg/dL (25 Jul 2021 05:05)      PHYSICAL EXAM:    General: NAD  HEENT: NC/AT; PERRL, clear conjunctiva  Neck: supple  Respiratory: CTA b/l  Cardiovascular: +S1/S2; RRR  Abdomen: soft, NT/ND; +BS x4  Extremities: WWP, 2+ peripheral pulses left; no LE edema. Right LE pulses present on doppler  : testicular swelling now improved   Skin: normal color and turgor; no rash  Neuro: A&Ox3     MEDICATIONS:  MEDICATIONS  (STANDING):  atorvastatin 40 milliGRAM(s) Oral at bedtime  cefepime   IVPB 1000 milliGRAM(s) IV Intermittent daily  chlorhexidine 2% Cloths 1 Application(s) Topical daily  dextrose 40% Gel 15 Gram(s) Oral once  dextrose 40% Gel 15 Gram(s) Oral once  dextrose 5%. 1000 milliLiter(s) (50 mL/Hr) IV Continuous <Continuous>  dextrose 5%. 1000 milliLiter(s) (100 mL/Hr) IV Continuous <Continuous>  dextrose 50% Injectable 25 Gram(s) IV Push once  dextrose 50% Injectable 12.5 Gram(s) IV Push once  dextrose 50% Injectable 25 Gram(s) IV Push once  diltiazem    Tablet 30 milliGRAM(s) Oral four times a day  epoetin danilo-epbx (RETACRIT) Injectable 42178 Unit(s) IV Push <User Schedule>  glucagon  Injectable 1 milliGRAM(s) IntraMuscular once  insulin lispro (ADMELOG) corrective regimen sliding scale   SubCutaneous every 6 hours  midodrine 20 milliGRAM(s) Oral every 8 hours  norepinephrine Infusion 0.05 MICROgram(s)/kG/Min (8.08 mL/Hr) IV Continuous <Continuous>  pantoprazole    Tablet 40 milliGRAM(s) Oral two times a day  vancomycin  IVPB 750 milliGRAM(s) IV Intermittent <User Schedule>    MEDICATIONS  (PRN):  acetaminophen   Tablet .. 1000 milliGRAM(s) Oral every 6 hours PRN Temp greater or equal to 38C (100.4F), Mild Pain (1 - 3), Moderate Pain (4 - 6)  calamine/zinc oxide Lotion 1 Application(s) Topical three times a day PRN Itching      ALLERGIES:  Allergies    lisinopril (Other)  opioid-like analgesics (Other)    Intolerances        LABS:                        7.2    13.20 )-----------( 221      ( 25 Jul 2021 04:09 )             24.3     07-25    136  |  97<L>  |  26<H>  ----------------------------<  170<H>  3.7   |  26  |  4.13<H>    Ca    9.1      25 Jul 2021 04:09  Phos  4.1     07-25  Mg     2.00     07-25    TPro  5.8<L>  /  Alb  2.2<L>  /  TBili  1.4<H>  /  DBili  x   /  AST  51<H>  /  ALT  19  /  AlkPhos  219<H>  07-25    PTT - ( 25 Jul 2021 04:09 )  PTT:33.8 sec      RADIOLOGY & ADDITIONAL TESTS: Reviewed.

## 2021-07-25 NOTE — SWALLOW BEDSIDE ASSESSMENT ADULT - SWALLOW EVAL: PROGNOSIS
DIAGNOSIS CONTINUED: 4. Patient presents with severe pharyngeal dysphagia with thin liquids characterized by suspected delay in pharyngeal swallow trigger and reduced hyolaryngeal elevation upon digital palpation. Immediate cough noted post intake of thin liquids, suggestive of airway aspiration/penetration. Of note, patient with reduced endurance during PO intake; patient with baseline increased work of breathing that was observed throughout evaluation.

## 2021-07-25 NOTE — PROGRESS NOTE ADULT - ASSESSMENT
71 yo M with PMhx of CAD s/p CABG (course at that time complicated by pleural effusion requiring chest tubes), ESRD on HD (TTS), DMII, HTN, afib on coumadin, and anemia presented from [facility] for evaluation of chest pain and GIB. Renal following for ESRD Mx. s/p cardiac arrest. CTH showed acute infarcts. Further w/u, MRSA bacteremia, TRUMAN showed MR vegetation.     ESRD  Maintenance schedule TTS  K acceptable  hypervolemic. has B lines on sono chest per ICU  s/p HD yesterday, Rx sheet reviewed, net UF 2kg, tolerated well. uneventful.  arranged for seqUF today 2.5hrs, net uf 2.5-3kg as tolerated by bp. on kashif lHD RN informed  plan for HD Monday  Improved hemodynamics, pressor titration as per ICU team. on Levophed wean as tolerated. agree w/inc midodrine dose  On diltiazem for afib rate control.   c/w vancomycin ivpb post hd, dosed by levels, for MRSA bacteremia and endocarditis.   dose all meds for ESRD  renal diet, fluid restriction    Anemia in CKD+GIB- Hb <goal  c/w Epo 20k tiw with HD  transfused prbc     d/w HD RN, family bedside, ICU resident earlier today  labs, chart reviewed  pl call for any q's   cell [phone]

## 2021-07-25 NOTE — PROGRESS NOTE ADULT - SUBJECTIVE AND OBJECTIVE BOX
EP ATTENDING    tele: NSR, PVCs, no significant events    lethargic, ROS unable to be obtained    DATE OF SERVICE - 07-25-21     Review of Systems:   Constitutional: [ ] fevers, [ ] chills.   Skin: [ ] dry skin. [ ] rashes.  Psychiatric: [ ] depression, [ ] anxiety.   Gastrointestinal: [ ] BRBPR, [ ] melena.   Neurological: [ ] confusion. [ ] seizures. [ ] shuffling gait.   Ears,Nose,Mouth and Throat: [ ] ear pain [ ] sore throat.   Eyes: [ ] diplopia.   Respiratory: [ ] hemoptysis. [ ] shortness of breath  Cardiovascular: See HPI above  Hematologic/Lymphatic: [ ] anemia. [ ] painful nodes. [ ] prolonged bleeding.   Genitourinary: [ ] hematuria. [ ] flank pain.   Endocrine: [ ] significant change in weight. [ ] intolerance to heat and cold.     Review of systems [ ] otherwise negative, [x ] otherwise unable to obtain    FH: no family history of sudden cardiac death in first degree relatives    SH: [ ] tobacco, [ ] alcohol, [ ] drugs    acetaminophen   Tablet .. 1000 milliGRAM(s) Oral every 6 hours PRN  atorvastatin 40 milliGRAM(s) Oral at bedtime  calamine/zinc oxide Lotion 1 Application(s) Topical three times a day PRN  cefepime   IVPB 1000 milliGRAM(s) IV Intermittent daily  chlorhexidine 2% Cloths 1 Application(s) Topical daily  dextrose 40% Gel 15 Gram(s) Oral once  dextrose 40% Gel 15 Gram(s) Oral once  dextrose 5%. 1000 milliLiter(s) IV Continuous <Continuous>  dextrose 5%. 1000 milliLiter(s) IV Continuous <Continuous>  dextrose 50% Injectable 25 Gram(s) IV Push once  dextrose 50% Injectable 12.5 Gram(s) IV Push once  dextrose 50% Injectable 25 Gram(s) IV Push once  diltiazem    Tablet 30 milliGRAM(s) Oral four times a day  epoetin danilo-epbx (RETACRIT) Injectable 68716 Unit(s) IV Push <User Schedule>  glucagon  Injectable 1 milliGRAM(s) IntraMuscular once  insulin lispro (ADMELOG) corrective regimen sliding scale   SubCutaneous every 6 hours  midodrine 20 milliGRAM(s) Oral every 8 hours  norepinephrine Infusion 0.05 MICROgram(s)/kG/Min IV Continuous <Continuous>  pantoprazole    Tablet 40 milliGRAM(s) Oral two times a day  vancomycin  IVPB 750 milliGRAM(s) IV Intermittent <User Schedule>                            7.2    13.20 )-----------( 221      ( 25 Jul 2021 04:09 )             24.3       07-25    136  |  97<L>  |  26<H>  ----------------------------<  170<H>  3.7   |  26  |  4.13<H>    Ca    9.1      25 Jul 2021 04:09  Phos  4.1     07-25  Mg     2.00     07-25    TPro  5.8<L>  /  Alb  2.2<L>  /  TBili  1.4<H>  /  DBili  x   /  AST  51<H>  /  ALT  19  /  AlkPhos  219<H>  07-25      CARDIAC MARKERS ( 23 Jul 2021 01:31 )  x     / x     / 599 U/L / x     / x            T(C): 37.2 (07-25-21 @ 12:00), Max: 37.3 (07-25-21 @ 04:00)  HR: 88 (07-25-21 @ 12:00) (77 - 98)  BP: 127/73 (07-25-21 @ 12:00) (57/26 - 127/73)  RR: 24 (07-25-21 @ 12:00) (16 - 35)  SpO2: 100% (07-25-21 @ 12:00) (88% - 100%)  Wt(kg): --    I&O's Summary    24 Jul 2021 07:01  -  25 Jul 2021 07:00  --------------------------------------------------------  IN: 1439.4 mL / OUT: 2700 mL / NET: -1260.6 mL    25 Jul 2021 07:01  -  25 Jul 2021 13:19  --------------------------------------------------------  IN: 69.2 mL / OUT: 0 mL / NET: 69.2 mL          Head: Normocephalic and atraumatic.   Neck: No JVD. No bruits. Supple. Does not appear to be enlarged.   Cardiovascular: + S1,S2 ; RRR Soft systolic murmur at the left lower sternal border. No rubs noted.    Lungs: CTA b/l. No rhonchi, rales or wheezes.   Abdomen: + BS, soft. Non tender. Non distended. No rebound. No guarding.   Extremities: No clubbing/cyanosis/edema.   Neurologic: Moves all four extremities. Full range of motion.   Skin: Warm and moist. The patient's skin has normal elasticity and good skin turgor.         A/P) 69 y/o male PMH CAD s/p CABG, PAF/AFL on coumadin, HTN, hyperlipidemia, DM, ESRD on HD, hypothyroidism a/w sepsis from mitral valve endocarditis. Had a PEA arrest while in the hospital.     -recommend lifelong a/c for PAF  -s/p PEA/jay/asystole arrest, secondary to critical illness and infection.  no pacemaker or ICD indicated at this time  -no further inpatient EP workup expected but will follow  -supportive care as per MICU

## 2021-07-25 NOTE — PROGRESS NOTE ADULT - SUBJECTIVE AND OBJECTIVE BOX
Date of service 7/25/21    CARDIOLOGY     tele -  NSR   no distress    Review of Systems:   Constitutional: [ ] fevers, [ ] chills.   Skin: [ ] dry skin. [ ] rashes.  Psychiatric: [ ] depression, [ ] anxiety.   Gastrointestinal: [ ] BRBPR, [ ] melena.   Neurological: [ ] confusion. [ ] seizures. [ ] shuffling gait.   Ears,Nose,Mouth and Throat: [ ] ear pain [ ] sore throat.   Eyes: [ ] diplopia.   Respiratory: [ ] hemoptysis. [ ] shortness of breath  Cardiovascular: See HPI above  Hematologic/Lymphatic: [ ] anemia. [ ] painful nodes. [ ] prolonged bleeding.   Genitourinary: [ ] hematuria. [ ] flank pain.   Endocrine: [ ] significant change in weight. [ ] intolerance to heat and cold.     Review of systems [ ] otherwise negative, [ x] otherwise unable to obtain    FH: no family history of sudden cardiac death in first degree relatives    SH: [ ] tobacco, [ ] alcohol, [ ] drugs           acetaminophen   Tablet .. 1000 milliGRAM(s) Oral every 6 hours PRN  atorvastatin 40 milliGRAM(s) Oral at bedtime  calamine/zinc oxide Lotion 1 Application(s) Topical three times a day PRN  cefepime   IVPB 1000 milliGRAM(s) IV Intermittent daily  chlorhexidine 2% Cloths 1 Application(s) Topical daily  dextrose 40% Gel 15 Gram(s) Oral once  dextrose 40% Gel 15 Gram(s) Oral once  dextrose 5%. 1000 milliLiter(s) IV Continuous <Continuous>  dextrose 5%. 1000 milliLiter(s) IV Continuous <Continuous>  dextrose 50% Injectable 25 Gram(s) IV Push once  dextrose 50% Injectable 12.5 Gram(s) IV Push once  dextrose 50% Injectable 25 Gram(s) IV Push once  diltiazem    Tablet 30 milliGRAM(s) Oral four times a day  epoetin danilo-epbx (RETACRIT) Injectable 35518 Unit(s) IV Push <User Schedule>  glucagon  Injectable 1 milliGRAM(s) IntraMuscular once  insulin lispro (ADMELOG) corrective regimen sliding scale   SubCutaneous every 6 hours  midodrine 20 milliGRAM(s) Oral every 8 hours  norepinephrine Infusion 0.05 MICROgram(s)/kG/Min IV Continuous <Continuous>  pantoprazole    Tablet 40 milliGRAM(s) Oral two times a day  vancomycin  IVPB 750 milliGRAM(s) IV Intermittent <User Schedule>                            7.2    13.20 )-----------( 221      ( 25 Jul 2021 04:09 )             24.3       Hemoglobin: 7.2 g/dL (07-25 @ 04:09)  Hemoglobin: 7.8 g/dL (07-24 @ 03:25)  Hemoglobin: 7.9 g/dL (07-23 @ 01:31)  Hemoglobin: 7.8 g/dL (07-22 @ 15:31)  Hemoglobin: 7.7 g/dL (07-22 @ 06:52)      07-25    136  |  97<L>  |  26<H>  ----------------------------<  170<H>  3.7   |  26  |  4.13<H>    Ca    9.1      25 Jul 2021 04:09  Phos  4.1     07-25  Mg     2.00     07-25    TPro  5.8<L>  /  Alb  2.2<L>  /  TBili  1.4<H>  /  DBili  x   /  AST  51<H>  /  ALT  19  /  AlkPhos  219<H>  07-25    Creatinine Trend: 4.13<--, 5.33<--, 4.11<--, 5.90<--, 4.75<--, 6.78<--    COAGS: PTT - ( 25 Jul 2021 04:09 )  PTT:33.8 sec    CARDIAC MARKERS ( 23 Jul 2021 01:31 )  x     / x     / 599 U/L / x     / x            T(C): 37.3 (07-25-21 @ 04:00), Max: 37.3 (07-25-21 @ 04:00)  HR: 83 (07-25-21 @ 08:00) (77 - 116)  BP: 107/65 (07-25-21 @ 08:00) (57/26 - 167/131)  RR: 24 (07-25-21 @ 08:00) (16 - 35)  SpO2: 100% (07-25-21 @ 08:00) (95% - 100%)  Wt(kg): --    I&O's Summary    24 Jul 2021 07:01  -  25 Jul 2021 07:00  --------------------------------------------------------  IN: 1439.4 mL / OUT: 2700 mL / NET: -1260.6 mL      General:   appears comfortable  Head: Normocephalic and atraumatic.   Neck: No JVD. No bruits. Supple. Does not appear to be enlarged.   Cardiovascular: + S1,S2 ; RRR Soft systolic murmur at the left lower sternal border. No rubs noted.    Lungs: decreased BS BL  Abdomen: + BS, soft. Non tender. Non distended. No rebound. No guarding.   Extremities: No clubbing/cyanosis/edema.   Neurologic: unable to assess  Skin: Warm and moist. The patient's skin has normal elasticity and good skin turgor.   Psychiatric: unable to assess   Musculoskeletal: unable to assess    DATA  < from: Transthoracic Echocardiogram (07.07.21 @ 18:17) >  ------------------------------------------------------------------------  CONCLUSIONS:  1. Mitral annular calcification, otherwise normal mitral  valve. Minimal mitral regurgitation.  2. Endocardium not well visualized; grossly normal left  ventricular systolic function.  3. Right ventricular enlargement with decreased right  ventricular systolic function.  4. Inadequate tricuspid regurgitation Doppler envelope  precludes estimation of RVSP.  ------------------------------------------------------------------------  Confirmed on  7/7/2021 - 21:16:20 by Osvaldo Bertrand M.D.,  [facility], [place]    < end of copied text >      A/P) 71 y/o male PMH CAD s/p CABG and old PCI, AFL x 1 year on coumadin, HTN, hyperlipidemia, DM, ESRD on HD, and hypothyroidism originally admitted to [facility] with GIB, transferred to [facility], found to have septic shock and MRSA bacteremia, trx to MICU s/p PEA arrest, intubated and now extubated    - cont pressor wean. tolerating Proamatine   -Given MRSA bacteremia, TRUMAN performed demonstrating MV endocarditis - discussed with CTS Dr. Pierce - pt. not a surgical candidate at this time   -Abx per ID, blood cx now + for serratia so zosyn added  -CTH head noted - pt. with cva - ac was restarted but now held due to dropping h/h   -Right thigh hematoma noted on CT scan - appreciate vascular evaluation - no surgical intervention required by vascular at this time  -Supportive care per MICU  -GOC discussions with family scheduled for 7/26

## 2021-07-25 NOTE — SWALLOW BEDSIDE ASSESSMENT ADULT - SWALLOW EVAL: RECOMMENDED FEEDING/EATING TECHNIQUES
allow for swallow between intakes/check mouth frequently for oral residue/pocketing/oral hygiene/small sips/bites

## 2021-07-25 NOTE — PROGRESS NOTE ADULT - SUBJECTIVE AND OBJECTIVE BOX
[facility] - S Eugene / Marlena S /D Cruz/ S Narendra/ WILY Macias/ Alexandre Esposito / WILMER Hermosillou/ O Jessi  service -[phone], office [phone]  ---------------------------------------------------------------------------------------------------------------    Patient seen and examined bedside in MICU    Subjective and Objective: No overnight events,  lethargic today , NAD  family bedside    Allergies: lisinopril (Other)  opioid-like analgesics (Other)      Hospital Medications:   MEDICATIONS  (STANDING):  atorvastatin 40 milliGRAM(s) Oral at bedtime  cefepime   IVPB 1000 milliGRAM(s) IV Intermittent daily  chlorhexidine 2% Cloths 1 Application(s) Topical daily  dextrose 40% Gel 15 Gram(s) Oral once  dextrose 40% Gel 15 Gram(s) Oral once  dextrose 5%. 1000 milliLiter(s) (50 mL/Hr) IV Continuous <Continuous>  dextrose 5%. 1000 milliLiter(s) (100 mL/Hr) IV Continuous <Continuous>  dextrose 50% Injectable 25 Gram(s) IV Push once  dextrose 50% Injectable 12.5 Gram(s) IV Push once  dextrose 50% Injectable 25 Gram(s) IV Push once  diltiazem    Tablet 30 milliGRAM(s) Oral four times a day  epoetin danilo-epbx (RETACRIT) Injectable 92857 Unit(s) IV Push <User Schedule>  glucagon  Injectable 1 milliGRAM(s) IntraMuscular once  insulin lispro (ADMELOG) corrective regimen sliding scale   SubCutaneous every 6 hours  midodrine 20 milliGRAM(s) Oral every 8 hours  norepinephrine Infusion 0.05 MICROgram(s)/kG/Min (8.08 mL/Hr) IV Continuous <Continuous>  pantoprazole    Tablet 40 milliGRAM(s) Oral two times a day  vancomycin  IVPB 750 milliGRAM(s) IV Intermittent <User Schedule>    VITALS:  T(F): 98.9 (07-25-21 @ 12:00), Max: 99.2 (07-25-21 @ 08:00)  HR: 91 (07-25-21 @ 16:00)  BP: 115/64 (07-25-21 @ 16:00)  RR: 27 (07-25-21 @ 16:00)  SpO2: 99% (07-25-21 @ 16:00)  Wt(kg): --    07-24 @ 07:01  -  07-25 @ 07:00  --------------------------------------------------------  IN: 1439.4 mL / OUT: 2700 mL / NET: -1260.6 mL    07-25 @ 07:01  -  07-25 @ 17:41  --------------------------------------------------------  IN: 69.2 mL / OUT: 0 mL / NET: 69.2 mL    PHYSICAL EXAM:  Constitutional: NAD  HEENT: anicteric sclera  Neck: No JVD  Respiratory: CTAB  Cardiovascular: S1, S2, RRR  Gastrointestinal: BS+, soft, NT/ND  Extremities: 1+ peripheral edema b/l   neuro:  lethargic   : No landen HARVEY AVF+thrill     LABS:  07-25    136  |  97<L>  |  26<H>  ----------------------------<  170<H>  3.7   |  26  |  4.13<H>    Ca    9.1      25 Jul 2021 04:09  Phos  4.1     07-25  Mg     2.00     07-25    TPro  5.8<L>  /  Alb  2.2<L>  /  TBili  1.4<H>  /  DBili      /  AST  51<H>  /  ALT  19  /  AlkPhos  219<H>  07-25    Creatinine Trend: 4.13 <--, 5.33 <--, 4.11 <--, 5.90 <--, 4.75 <--, 6.78 <--, 5.79 <--, 5.45 <--                        7.2    13.20 )-----------( 632 ( 25 Jul 2021 04:09 )             24.3     Urine Studies:    RADIOLOGY & ADDITIONAL STUDIES:

## 2021-07-25 NOTE — PROGRESS NOTE ADULT - ASSESSMENT
Attending addendum:   Agree with above   Not a candidate for [facility] per CTS  Family meeting this week to discuss GOC    Kalie Lau MD

## 2021-07-26 LAB
ALBUMIN SERPL ELPH-MCNC: 2.5 G/DL — LOW (ref 3.3–5)
ALP SERPL-CCNC: 310 U/L — HIGH (ref 40–120)
ALT FLD-CCNC: 27 U/L — SIGNIFICANT CHANGE UP (ref 4–41)
ANION GAP SERPL CALC-SCNC: 16 MMOL/L — HIGH (ref 7–14)
ANISOCYTOSIS BLD QL: SIGNIFICANT CHANGE UP
APTT BLD: 30.2 SEC — SIGNIFICANT CHANGE UP (ref 27–36.3)
AST SERPL-CCNC: 63 U/L — HIGH (ref 4–40)
BASOPHILS # BLD AUTO: 0 K/UL — SIGNIFICANT CHANGE UP (ref 0–0.2)
BASOPHILS NFR BLD AUTO: 0 % — SIGNIFICANT CHANGE UP (ref 0–2)
BILIRUB SERPL-MCNC: 2 MG/DL — HIGH (ref 0.2–1.2)
BUN SERPL-MCNC: 36 MG/DL — HIGH (ref 7–23)
CALCIUM SERPL-MCNC: 9.4 MG/DL — SIGNIFICANT CHANGE UP (ref 8.4–10.5)
CHLORIDE SERPL-SCNC: 93 MMOL/L — LOW (ref 98–107)
CO2 SERPL-SCNC: 25 MMOL/L — SIGNIFICANT CHANGE UP (ref 22–31)
CREAT SERPL-MCNC: 5.06 MG/DL — HIGH (ref 0.5–1.3)
CULTURE RESULTS: SIGNIFICANT CHANGE UP
EOSINOPHIL # BLD AUTO: 0 K/UL — SIGNIFICANT CHANGE UP (ref 0–0.5)
EOSINOPHIL NFR BLD AUTO: 0 % — SIGNIFICANT CHANGE UP (ref 0–6)
GIANT PLATELETS BLD QL SMEAR: PRESENT — SIGNIFICANT CHANGE UP
GLUCOSE BLDC GLUCOMTR-MCNC: 144 MG/DL — HIGH (ref 70–99)
GLUCOSE BLDC GLUCOMTR-MCNC: 147 MG/DL — HIGH (ref 70–99)
GLUCOSE BLDC GLUCOMTR-MCNC: 151 MG/DL — HIGH (ref 70–99)
GLUCOSE BLDC GLUCOMTR-MCNC: 168 MG/DL — HIGH (ref 70–99)
GLUCOSE BLDC GLUCOMTR-MCNC: 169 MG/DL — HIGH (ref 70–99)
GLUCOSE SERPL-MCNC: 140 MG/DL — HIGH (ref 70–99)
GRAM STN FLD: SIGNIFICANT CHANGE UP
HCT VFR BLD CALC: 27.2 % — LOW (ref 39–50)
HGB BLD-MCNC: 8 G/DL — LOW (ref 13–17)
IANC: 12.68 K/UL — HIGH (ref 1.5–8.5)
LYMPHOCYTES # BLD AUTO: 0.26 K/UL — LOW (ref 1–3.3)
LYMPHOCYTES # BLD AUTO: 1.7 % — LOW (ref 13–44)
MAGNESIUM SERPL-MCNC: 2.1 MG/DL — SIGNIFICANT CHANGE UP (ref 1.6–2.6)
MANUAL SMEAR VERIFICATION: SIGNIFICANT CHANGE UP
MCHC RBC-ENTMCNC: 26.9 PG — LOW (ref 27–34)
MCHC RBC-ENTMCNC: 29.4 GM/DL — LOW (ref 32–36)
MCV RBC AUTO: 91.6 FL — SIGNIFICANT CHANGE UP (ref 80–100)
MONOCYTES # BLD AUTO: 0.27 K/UL — SIGNIFICANT CHANGE UP (ref 0–0.9)
MONOCYTES NFR BLD AUTO: 1.8 % — LOW (ref 2–14)
MYELOCYTES NFR BLD: 0.9 % — HIGH (ref 0–0)
NEUTROPHILS # BLD AUTO: 14.41 K/UL — HIGH (ref 1.8–7.4)
NEUTROPHILS NFR BLD AUTO: 95.6 % — HIGH (ref 43–77)
ORGANISM # SPEC MICROSCOPIC CNT: SIGNIFICANT CHANGE UP
ORGANISM # SPEC MICROSCOPIC CNT: SIGNIFICANT CHANGE UP
PHOSPHATE SERPL-MCNC: 4.7 MG/DL — HIGH (ref 2.5–4.5)
PLAT MORPH BLD: NORMAL — SIGNIFICANT CHANGE UP
PLATELET # BLD AUTO: 266 K/UL — SIGNIFICANT CHANGE UP (ref 150–400)
PLATELET COUNT - ESTIMATE: NORMAL — SIGNIFICANT CHANGE UP
POLYCHROMASIA BLD QL SMEAR: SIGNIFICANT CHANGE UP
POTASSIUM SERPL-MCNC: 3.7 MMOL/L — SIGNIFICANT CHANGE UP (ref 3.5–5.3)
POTASSIUM SERPL-SCNC: 3.7 MMOL/L — SIGNIFICANT CHANGE UP (ref 3.5–5.3)
PROT SERPL-MCNC: 6.4 G/DL — SIGNIFICANT CHANGE UP (ref 6–8.3)
RBC # BLD: 2.97 M/UL — LOW (ref 4.2–5.8)
RBC # FLD: 19.4 % — HIGH (ref 10.3–14.5)
RBC BLD AUTO: ABNORMAL
SODIUM SERPL-SCNC: 134 MMOL/L — LOW (ref 135–145)
WBC # BLD: 15.07 K/UL — HIGH (ref 3.8–10.5)
WBC # FLD AUTO: 15.07 K/UL — HIGH (ref 3.8–10.5)

## 2021-07-26 PROCEDURE — 99233 SBSQ HOSP IP/OBS HIGH 50: CPT | Mod: GC

## 2021-07-26 PROCEDURE — 76705 ECHO EXAM OF ABDOMEN: CPT | Mod: 26

## 2021-07-26 PROCEDURE — 99291 CRITICAL CARE FIRST HOUR: CPT | Mod: 25

## 2021-07-26 PROCEDURE — 99233 SBSQ HOSP IP/OBS HIGH 50: CPT

## 2021-07-26 PROCEDURE — 76604 US EXAM CHEST: CPT | Mod: 26

## 2021-07-26 PROCEDURE — 93308 TTE F-UP OR LMTD: CPT | Mod: 26

## 2021-07-26 RX ORDER — MIDAZOLAM HYDROCHLORIDE 1 MG/ML
4 INJECTION, SOLUTION INTRAMUSCULAR; INTRAVENOUS ONCE
Refills: 0 | Status: DISCONTINUED | OUTPATIENT
Start: 2021-07-26 | End: 2021-07-26

## 2021-07-26 RX ADMIN — Medication 30 MILLIGRAM(S): at 05:15

## 2021-07-26 RX ADMIN — Medication 250 MILLIGRAM(S): at 03:36

## 2021-07-26 RX ADMIN — MIDODRINE HYDROCHLORIDE 20 MILLIGRAM(S): 2.5 TABLET ORAL at 05:15

## 2021-07-26 RX ADMIN — Medication 1: at 05:14

## 2021-07-26 RX ADMIN — CEFEPIME 100 MILLIGRAM(S): 1 INJECTION, POWDER, FOR SOLUTION INTRAMUSCULAR; INTRAVENOUS at 11:36

## 2021-07-26 RX ADMIN — MIDODRINE HYDROCHLORIDE 20 MILLIGRAM(S): 2.5 TABLET ORAL at 14:52

## 2021-07-26 RX ADMIN — Medication 30 MILLIGRAM(S): at 18:58

## 2021-07-26 RX ADMIN — Medication 1: at 11:38

## 2021-07-26 RX ADMIN — PANTOPRAZOLE SODIUM 40 MILLIGRAM(S): 20 TABLET, DELAYED RELEASE ORAL at 05:15

## 2021-07-26 RX ADMIN — MIDODRINE HYDROCHLORIDE 20 MILLIGRAM(S): 2.5 TABLET ORAL at 23:27

## 2021-07-26 RX ADMIN — Medication 30 MILLIGRAM(S): at 23:27

## 2021-07-26 RX ADMIN — PANTOPRAZOLE SODIUM 40 MILLIGRAM(S): 20 TABLET, DELAYED RELEASE ORAL at 18:58

## 2021-07-26 RX ADMIN — Medication 30 MILLIGRAM(S): at 11:37

## 2021-07-26 NOTE — PROGRESS NOTE ADULT - ATTENDING COMMENTS
Patient examined and care reviewed in detail on bedside rounds  Critically ill on with multifactorial brain injury D/W family re goals of care  Frequent bedside visits with therapy change today.   I have personally provided 35+ minutes of critical care time concurrently with the resident/fellow; this excludes time spent on separate procedures.

## 2021-07-26 NOTE — PROGRESS NOTE ADULT - SUBJECTIVE AND OBJECTIVE BOX
Follow Up:      Inverval History/ROS:Patient is a 70y old  Male who presents with a chief complaint of Chest pain and GIB (26 Jul 2021 14:33)    No fever    Allergies    lisinopril (Other)  opioid-like analgesics (Other)    Intolerances        ANTIMICROBIALS:  cefepime   IVPB 1000 daily  vancomycin  IVPB 750 <User Schedule>      OTHER MEDS:  acetaminophen   Tablet .. 1000 milliGRAM(s) Oral every 6 hours PRN  atorvastatin 40 milliGRAM(s) Oral at bedtime  calamine/zinc oxide Lotion 1 Application(s) Topical three times a day PRN  chlorhexidine 2% Cloths 1 Application(s) Topical daily  dextrose 40% Gel 15 Gram(s) Oral once  dextrose 40% Gel 15 Gram(s) Oral once  dextrose 5%. 1000 milliLiter(s) IV Continuous <Continuous>  dextrose 5%. 1000 milliLiter(s) IV Continuous <Continuous>  dextrose 50% Injectable 25 Gram(s) IV Push once  dextrose 50% Injectable 12.5 Gram(s) IV Push once  dextrose 50% Injectable 25 Gram(s) IV Push once  diltiazem    Tablet 30 milliGRAM(s) Oral four times a day  epoetin danilo-epbx (RETACRIT) Injectable 54319 Unit(s) IV Push <User Schedule>  glucagon  Injectable 1 milliGRAM(s) IntraMuscular once  insulin lispro (ADMELOG) corrective regimen sliding scale   SubCutaneous every 6 hours  midodrine 20 milliGRAM(s) Oral every 8 hours  norepinephrine Infusion 0.05 MICROgram(s)/kG/Min IV Continuous <Continuous>  pantoprazole    Tablet 40 milliGRAM(s) Oral two times a day      Vital Signs Last 24 Hrs  T(C): 35.6 (26 Jul 2021 12:00), Max: 37.3 (25 Jul 2021 20:00)  T(F): 96 (26 Jul 2021 12:00), Max: 99.2 (25 Jul 2021 20:00)  HR: 74 (26 Jul 2021 14:00) (68 - 94)  BP: 110/52 (26 Jul 2021 14:00) (104/72 - 146/68)  BP(mean): 65 (26 Jul 2021 14:00) (47 - 97)  RR: 31 (26 Jul 2021 14:00) (20 - 31)  SpO2: 95% (26 Jul 2021 14:00) (88% - 100%)    PHYSICAL EXAM:  General: [ ] non-toxic  HEAD/EYES: [ ] PERRL [x ] white sclera [ ] icterus  ENT:  [ ] normal [ ] supple [ ] thrush [ ] pharyngeal exudate  Cardiovascular:   [ ] murmur [ ] normal [ ] PPM/AICD  Respiratory:  [x ] clear to ausculation bilaterally  GI:  [ x] soft, non-tender, normal bowel sounds  :  [ ] wagner [x ] no CVA tenderness   Musculoskeletal:  [ ] no synovitis  Neurologic:  [ ] non-focal exam   Skin:  [x ] no rash  Lymph: [ x] no lymphadenopathy  Psychiatric:  [ ] appropriate affect [ ] alert & oriented  Lines:  [ x] no phlebitis [ ] central line                                8.0    15.07 )-----------( 266      ( 26 Jul 2021 04:25 )             27.2       07-26    134<L>  |  93<L>  |  36<H>  ----------------------------<  140<H>  3.7   |  25  |  5.06<H>    Ca    9.4      26 Jul 2021 04:25  Phos  4.7     07-26  Mg     2.10     07-26    TPro  6.4  /  Alb  2.5<L>  /  TBili  2.0<H>  /  DBili  x   /  AST  63<H>  /  ALT  27  /  AlkPhos  310<H>  07-26          MICROBIOLOGY:Culture Results:   No growth to date. (07-24-21 @ 18:47)  Culture Results:   Growth in aerobic bottle: Gram Negative Rods  Growth in anaerobic bottle: Gram Negative Rods (07-24-21 @ 18:45)  Culture Results:   Growth in aerobic bottle: Serratia marcescens  Growth in anaerobic bottle: Gram Negative Rods  ***Blood Panel PCR results on this specimen are available  approximately 3 hours after the Gram stain result.***  Gram stain, PCR, and/or culture resultsmay not always  correspond due to difference in methodologies.  ************************************************************  This PCR assay was performed by multiplex PCR. This  Assay tests for 66 bacterial and resistance gene targets.  Please referto the [facility] test directory  at [email or web address] for details. (07-22-21 @ 22:57)      RADIOLOGY:

## 2021-07-26 NOTE — PROGRESS NOTE ADULT - ASSESSMENT
69 yo M with PMhx of CAD s/p CABG (course at that time complicated by pleural effusion requiring chest tubes), ESRD on HD (TTS), DMII, HTN, afib on coumadin, and anemia presented from [facility] for evaluation of chest pain and GIB. Renal following for ESRD Mx. s/p cardiac arrest. CTH showed acute infarcts. Further w/u, MRSA bacteremia, TRUMAN showed MR vegetation.     ESRD  Maintenance schedule TTS  K acceptable  hypervolemic  s/p HD 7/24  s/p extra HD for seqUF yesterday, Rx sheet reviewed, net UF 2.5kg, tolerated well. uneventful.  unable to accommodate HD today 2/2 scheduling issue  oxygenation appears improving   plan for HD tomorrow 3.5hrs, net uf 3kg as tolerated by bp  Improved hemodynamics, pressor titration as per ICU team. on Levophed wean as tolerated. c/w midodrine   On diltiazem for afib rate control.   c/w vancomycin ivpb post hd, dosed by levels, for MRSA bacteremia and endocarditis.   dose all meds for ESRD  renal diet, fluid restriction    Anemia in CKD+GIB- Hb <goal  c/w Epo 20k tiw with HD  transfused prbc     d/w HD RN, family bedside, ICU resident earlier today  labs, chart reviewed  pl call for any q's   cell [phone]    71 yo M with PMhx of CAD s/p CABG (course at that time complicated by pleural effusion requiring chest tubes), ESRD on HD (TTS), DMII, HTN, afib on coumadin, and anemia presented from [facility] for evaluation of chest pain and GIB. Renal following for ESRD Mx. s/p cardiac arrest. CTH showed acute infarcts. Further w/u, MRSA bacteremia, TRUMAN showed MR vegetation.     ESRD  Maintenance schedule TTS  K acceptable  hypervolemic  s/p HD 7/24  s/p extra HD for seqUF yesterday, Rx sheet reviewed, net UF 2.5kg, tolerated well. uneventful.  unable to accommodate HD today 2/2 scheduling issue  oxygenation appears improving   plan for next HD tomorrow 3.5hrs, net uf 3kg as tolerated by bp  consider rpting CXR  Improved hemodynamics, pressor titration as per ICU team. on Levophed wean as tolerated. c/w midodrine   On diltiazem for afib rate control.   c/w vancomycin ivpb post hd, dosed by levels, for MRSA bacteremia and endocarditis.   dose all meds for ESRD  renal diet, fluid restriction    Anemia in CKD+GIB- Hb <goal  c/w Epo 20k tiw with HD  s/p prbc     d/w HD RN, family bedside  labs, chart reviewed  pl call for any q's   cell [phone]

## 2021-07-26 NOTE — PROGRESS NOTE ADULT - PROBLEM SELECTOR PLAN 4
pt is dnr/dni  no escalation of care  continue current medical management  if pt is unable to tolerate HD then he would a hospice candidate

## 2021-07-26 NOTE — CHART NOTE - NSCHARTNOTEFT_GEN_A_CORE
:    INDICATION:    PROCEDURE:  [x] LIMITED ECHO  [ ] LIMITED CHEST  [ ] LIMITED RETROPERITONEAL  [ ] LIMITED ABDOMINAL  [ ] LIMITED DVT  [ ] NEEDLE GUIDANCE VASCULAR  [ ] NEEDLE GUIDANCE THORACENTESIS  [ ] NEEDLE GUIDANCE PARACENTESIS  [ ] NEEDLE GUIDANCE PERICARDIOCENTESIS  [ ] OTHER    FINDINGS:     Lung: Moderate LEFT-sided PLEFF.     Cardiac: Normal LV function. No RV dilation. No pericardial effusion.   Moderate RV enlargement with 1cm RV wall thickness. IVC indeterminate.       INTERPRETATION:

## 2021-07-26 NOTE — PROGRESS NOTE ADULT - ASSESSMENT
Assessment and Plan: 	  71 yo M with PMhx of CAD s/p CABG (course at that time complicated by pleural effusion requiring chest tubes), ESRD on HD (MWF), DMII, HTN, afib on coumadin, and anemia transferred from [facility] for evaluation of chest pain after HD and GIB s/p 2U PRBC transfusion and aflutter/afib with RVR managed with metoprolol. Admitted to MICU for hypotension dependent on pressors likely 2/2 to sepsis. In MICU, patient had a cardiac arrest on 7/13 requiring CPR, 3x epi, and 2x shock (for VTach). Course now complicated by recent drop in hgb requiring pRBCs. CT of leg shows hematoma on right adductor.    #Neuro  - Off of sedation. A&Ox3  - CT head shows foci of low density in the R anterolateral frontal lobe and L posterior temporal lobe concerning for acute infarcts.  - Mechanism likely cardioembolic in setting of patient with known atrial fibrillation with cardiac arrest vs septic emboli  - per neurology, can consider MRI brain w/o contrast. However, unlikely to  since patient has known atrial fibrillation and will need to be continued of full dose anticoagulation. Currently holding given recent bleeding  - Avoid hypotension, keep -180 for neuroprotection  - c/w Atorvastatin 40MG QHS for secondary stroke prevention.  - more lethargic during team rounds, will obtain ABG to evaluate change in mental status    #Cardiovascular  - Cardiac arrest: Patient had asystole night of 7/13 and CPR was started. S/p epi x3 and shock x2 with return to NSR. Cards is following  - TTE on 7/13 showed Mild mitral regurgitation with biventricular dysfunction of LV and RV systolic function. Endocardium was not well visualized, Hypokinesis of the basal inferior wall and basal inferoseptum.  - TRUMAN on 7/19 showed a large echodensity consisted with endocarditis and PFO  - Cardiology spoke with CTS, no surgery at this time. (Dr. Herzog)  - c/w Levophed wean as tolerated. Currently stable on 0.02  - c/  midodrine TID to help assist weaning off of pressors   - BP goal is >100 systolic  - troponinemia likely secondary to demand ischemia and ESRD     A. Fib  - on coumadin at home,  - holding heparin in setting of recent drop in hgb  - From neurology stand point, heparin is sufficient for now. Will need to be switched to DOAC in the future if kidney function allows  - 7/11: tried to transition heparin gtt to eliquis but pt is not a candidate due to his acute MIGUEL; back on heparin gtt  - c/ diltiazem for rate control    #Respiratory  - Loculated Pleural effusions: chest tube removed  - pleural fluid cx: (-); transudative.  - Currently O2 sat 99% on 6L NC. Tried to wean down to 4L and patient desatted, will keep 6L at this time  - CXR s/p chest tube removal showed no evidence of atelectasis. Demonstrated loculated left pleural effusion decreased in size and new hazy right lower lung opacity which could be due to a small layering right pleural effusion with associated passive atelectasis, atelectasis of other cause, and/or pneumonia.  - abx as below    #GI/Nutrition:  - NPO with tube feed. Holding at this time in preparation for extubation  - Was c/o abdominal pain on presentation, bedside POCUS didn't show ascites. No abdominal pain at this time. Continue to monitor.   - negative occult bleeding, hgb stable  - Per GI, recent decrease in hgb less likely to be a GIB, appreciate recs  - FOBT negative  - Zofran PRN for Nausea.    #/Renal  - ESRD: HD on MWF. Most recent Scr 4.6. Potassium is stable.   - Last dialysis session 7/24. Will likely not receive dialysis today.   - Recommend removing 2-3L of fluids as patient is net positive for I&Os. Discussed with nephro  - Electrolytes stable  - c/w Epo 20k tiw with HD  - Scotal US demonstrates swelling in the scrotum, improving with dialysis yesterday. likely in setting of fluid overload. Manage as above      #Skin  - HD Access: fistula in left upper arm  - Improving erythematous and tender on right wrist. Continue to monitor. RUE US neg for thrombus  - US of fistula showed patent fistula with no abscess  - Arrow is patent in right upper extremity.    #ID  - S aureus PCR: (+),  MRSA PCR negative  - BCx on 7/7 now growing MRSA: restarted on vanc (7/11- )  - Vanc 7/23 was 24, dose held, plan for after dialysis saturday 7/24  - Blood Cx 7/22 growing gram negative rods, Serratia on Cefepime (7/24 - ).    #Endocrine  - DMII: c/w Lispro SS. Monitor glucose.      #Hematologic/DVT ppx  - monitor PTT and cbc q12  - Will hold heparin in setting of recent decrease in hgb. No transfusion needed o/n  - Patient complaining of right thigh pain. CTA demonstrated A moderate-sized hematoma in the right adductor muscle group.  - Contacted surgery regarding the hematoma, no surgery at this time. c/ with ACE wrap at this time.  - PRN dilaudid for pain control  - Leukocytosis as above    #Ethics  - full code as per wife- full code as per wife Assessment and Plan: 	  71 yo M with PMhx of CAD s/p CABG (course at that time complicated by pleural effusion requiring chest tubes), ESRD on HD (MWF), DMII, HTN, afib on coumadin, and anemia transferred from [facility] for evaluation of chest pain after HD and GIB s/p 2U PRBC transfusion and aflutter/afib with RVR managed with metoprolol. Admitted to MICU for hypotension dependent on pressors likely 2/2 to sepsis. In MICU, patient had a cardiac arrest on 7/13 requiring CPR, 3x epi, and 2x shock (for VTach). Course now complicated by recent drop in hgb requiring pRBCs. CT of leg shows hematoma on right adductor. Patient is lethargic, suggesting compromised mental status. Differential etiology to explain AMS would include neurological versus metabolic. Plan for head CT and RUQ US today to further investigate these possible etiologies.    #Neuro  - Off of sedation. Alert, lethargic, not following commands  - CT head shows foci of low density in the R anterolateral frontal lobe and L posterior temporal lobe concerning for acute infarcts --> will repeat head CT today, given declining mental status  - Mechanism likely cardioembolic in setting of patient with known atrial fibrillation with cardiac arrest vs septic emboli  - per neurology, can consider MRI brain w/o contrast. However, unlikely to  since patient has known atrial fibrillation and will need to be continued of full dose anticoagulation. Currently holding given recent bleeding  - Avoid hypotension, keep -180 for neuroprotection  - c/w Atorvastatin 40MG QHS for secondary stroke prevention.  - more lethargic during team rounds, obtained ABG on 7/25 to evaluate change in mental status --> pH 7.38 wnl, pCO2 50 slightly elevated    #Cardiovascular  - Cardiac arrest: Patient had asystole night of 7/13 and CPR was started. S/p epi x3 and shock x2 with return to NSR. Cards is following  - TTE on 7/13 showed Mild mitral regurgitation with biventricular dysfunction of LV and RV systolic function. Endocardium was not well visualized, Hypokinesis of the basal inferior wall and basal inferoseptum.  - TRUMAN on 7/19 showed a large echodensity consisted with endocarditis and PFO  - Cardiology spoke with CTS, no surgery at this time. (Dr. Herzog)  - c/w Levophed wean as tolerated. Currently stable on 0.05  - c/  midodrine 20mg TID to help assist weaning off of pressors   - BP goal is >100 systolic  - troponinemia likely secondary to demand ischemia and ESRD     A. Fib  - on coumadin at home,  - holding heparin in setting of recent drop in hgb  - From neurology stand point, heparin is sufficient for now. Will need to be switched to DOAC in the future if kidney function allows  - 7/11: tried to transition heparin gtt to eliquis but pt is not a candidate due to his acute MIGUEL; back on heparin gtt  - c/ diltiazem for rate control    #Respiratory  - Loculated Pleural effusions: chest tube removed  - pleural fluid cx: (-); transudative.  - Currently O2 sat 99% on 6L NC. Tried to wean down to 4L and patient desatted, will keep 6L at this time  - CXR s/p chest tube removal showed no evidence of atelectasis. Demonstrated loculated left pleural effusion decreased in size and new hazy right lower lung opacity which could be due to a small layering right pleural effusion with associated passive atelectasis, atelectasis of other cause, and/or pneumonia.  - abx as below    #GI/Nutrition:  - NPO with tube feed  - alk phos elevated at 310 this AM (prev 219 1d ago), steadily uptrending -->given this finding combined with AMS, will obtain urgent RUQ US  - Was c/o abdominal pain on presentation, bedside POCUS didn't show ascites. Continue to monitor.   - negative occult bleeding, hgb stable  - Per GI, recent decrease in hgb less likely to be a GIB, appreciate recs  - FOBT negative  - Zofran PRN for Nausea.    #/Renal  - ESRD: HD on MWF. Most recent Scr 4.6. Potassium is stable.   - Last dialysis session 7/24. Received dialysis overnight 7/25.  - Recommend removing 2-3L of fluids as patient is net positive for I&Os. Discussed with nephro  - Electrolytes stable  - c/w Epo 20k tiw with HD  - Scotal US demonstrates swelling in the scrotum, improving with dialysis yesterday. likely in setting of fluid overload. Manage as above      #Skin  - HD Access: fistula in left upper arm  - Improving erythematous and tender on right wrist. Continue to monitor. RUE US neg for thrombus  - US of fistula showed patent fistula with no abscess  - Arrow is patent in right upper extremity.    #ID  - S aureus PCR: (+),  MRSA PCR negative  - BCx on 7/7 now growing MRSA: restarted on vanc (7/11- )  - Vanc 7/23 was 24, dose held, plan for after dialysis saturday 7/24 --> next dose due 7/27  - Blood Cx 7/22 growing gram negative rods, Serratia on Cefepime (7/24 - ).    #Endocrine  - DMII: c/w Lispro SS. Monitor glucose.      #Hematologic/DVT ppx  - monitor PTT and cbc q12  - Will hold heparin in setting of recent decrease in hgb. No transfusion needed o/n  - Patient complaining of right thigh pain. CTA demonstrated A moderate-sized hematoma in the right adductor muscle group.  - Contacted surgery regarding the hematoma, no surgery at this time. c/ with ACE wrap at this time.  - PRN dilaudid for pain control  - Leukocytosis as above    #Ethics  - full code as per wife- full code as per wife

## 2021-07-26 NOTE — PROGRESS NOTE ADULT - ASSESSMENT
71 yo M with PMhx of CAD s/p CABG (course at that time complicated by pleural effusion requiring chest tubes), ESRD on HD (MWF), DMII, HTN, afib on coumadin, and anemia presented from [facility] for evaluation of chest pain and GIB.       left sided pleural effusion: loculated and chronic:   CAD:  CABG:  ESRD:   DM:  HTN:  A fibrillation:   GI Bleed    7/7:    left sided pleural effusion: loculated and chronic: he has chr loculated pleural effusion on left side: He had chest tube placement in last June 2020 at another hospital: no chemistry is available but he had negative cytology at that time: This time the effusion seems slightly worse and has loculated effusion with pleural thickening: heis not SOB andhasbeen on roomair: I think , it at all, if this effusion needs to be dealt with : it is probably vats: will contact thoracic surgery : etiology not very clear as there is no previous chemistry: coult be exudative or transudatve: he is on HD and now it is a chr christiano effusion   CAD: cont per cards  CABG: cont current meds  ESRD: on HD   DM: monitor and control  HTN: controlled  A fibrillation: off ac:   GI Bleed: per gi :  DW pt and t [email or web address]    7/8:    left sided pleural effusion:: fever: s/p left sided chest tube placement: cultures sent: however with pr criteria it seems transudative await serum LDH but pleural fluid LDH is low:  already started on broad spectrum antibiotics ID to see: await cultures:   CAD: cont per cards  CABG: cont current meds  ESRD: on HD   DM: monitor and control  HTN: controlled  A fibrillation: off ac:   GI Bleed: per gi :  DW pmd    7/9:    left sided pleural effusion:: fever: s/p left sided chest tube placement: cultures sent: however with pr criteria it seems borderline transudative and transudative by LDH criteria: the vulutres ahve been negative so far:  already started on broad spectrum antibiotics ID to see: await cultures: ? source of spesis  CAD: cont per cards  CABG: cont current meds  ESRD: on HD   DM: monitor and control  HTN: in shock: now on vasopressors:   A fibrillation: off ac:   GI Bleed: per gi :  DW pmd  micu team    7/11:      left sided pleural effusion:: fever: s/p left sided chest tube placement: cultures sent: however with pr criteria it seems borderline transudative and transudative by LDH criteria: the cultures have been negative so far:  already started on broad spectrum antibiotics : Has MSSA bactermia  CAD: cont per cards: still on vasopressors: wean as tolerated ? needs molly ro tule out IE : would defer to cards:   CABG: cont current meds  ESRD: on HD   DM: monitor and control  HTN: in shock: now on vasopressors:   A fibrillation: off ac:   GI Bleed: per gi :  DW pmd  micu team    7/12:    left sided pleural effusion:: fever: s/p left sided chest tube placement:- now removed: cultures sent: however with pr criteria it seems borderline transudative and transudative by LDH criteria: the cultures have been negative so far:  already started on broad spectrum antibiotics : Has MRSA bactermia: vanco restarted yesterday ? echo/molly  Ac hypercarbic resp fialure: needs to be watched closelyin MICU : needs bipap may need intubation" At this time he is alert and awake: and responds to simple questions:    CAD: cont per cards: still on vasopressors: wean as tolerated ? needs molly ro tule out IE : would defer to cards:   CABG: cont current meds  ESRD: on HD   DM: monitor and control  HTN: in shock: now on vasopressors:   A fibrillation: off ac:   GI Bleed: per gi :  DW pmd  cards and bedstaff    7/13:  left sided pleural effusion:: fever: s/p left sided chest tube placement:- now removed: cultures sent: however with pr criteria it seems borderline transudative and transudative by LDH criteria: the cultures have been negative so far:  already started on broad spectrum antibiotics : Has MRSA bacteremia vanco restarted yesterday ? echo/molly  Ac hypercarbic resp fialure: now intubated s/p acls protocol last night   CAD: cont per cards: still on vasopressors: wean as tolerated ? needs molly ro tule out IE : would defer to cards:   CABG: cont current meds  ESRD: on HD   DM: monitor and control  HTN: in shock: now on vasopressors:   A fibrillation: off ac:   GI Bleed: per gi :  DELLA  staff  pt is critically ill now:     7/14:  left sided pleural effusion:: fever: s/p left sided chest tube placement:- now removed: cultures sent: however with pr criteria it seems borderline transudative and transudative by LDH criteria: the cultures have been negative so far:  already started on broad spectrum antibiotics : Has MRSA bacteremia vanco restarted yesterday now for  echo/molly  Ac hypercarbic resp fialure: now intubated s/p acls protocol last night -s/p cardiac arrest: ROSC 10 mts:   CAD: cont per cards: still on vasopressors: wean as tolerated ? needs molly ro tule out IE : would defer to cards: Cotn antbitioc  CABG: cont current meds  ESRD: on HD   DM: monitor and control  HTN: in shock: now on vasopressors:   A fibrillation: off ac:   GI Bleed: per gi :  DW  staff  pt is critically ill now: dw cards    7/15:    left sided pleural effusion:: transudative pl effusion with no with: certainly not the source for MRSA in blood:   Ac hypercarbic resp fialure: now intubated s/p acls protocol last night -s/p cardiac arrest: ROSC 10 mts:   CAD: cont per cards: still on vasopressors: wean as tolerated ? needs molly ro tule out IE : would defer to cards: Cotn antbitioc  CABG: cont current meds  ESRD: on HD   DM: monitor and control  HTN: in shock: now on vasopressors:   A fibrillation: on ac:   GI Bleed: per gi :  DW  staff  pt is critically ill now:     7/16"  left sided pleural effusion:: transudative pl effusion with no with: certainly not the source for MRSA in blood: ?source for MRSA:   Ac hypercarbic resp fialure: now intubated s/p acls protocol last night -s/p cardiac arrest: ROSC 10 mts: he seems to respond off sedation:per MICU note:   CAD: cont per cards: still on vasopressors: wean as tolerated ? needs molly ro tule out IE : would defer to cards: Cotn antbitioc  CABG: cont current meds  ESRD: on HD   DM: monitor and control  HTN: in shock: now on vasopressors:   A fibrillation: on ac:   GI Bleed: per gi :  DW  staff: PMD  pt is critically ill now:      7/17:    left sided pleural effusion:: transudative pl effusion with no with: certainly not the source for MRSA in blood: ?source for MRSA: for molly on monday  Ac hypercarbic resp fialure: now intubated s/p acls protocol last night -s/p cardiac arrest: ROSC 10 mts:   CAD: cont per cards: still on vasopressors: wean as tolerated ? needs molly ro tule out IE : would defer to cards: Cont antibiotics:   CABG: cont current meds  ESRD: on HD   DM: monitor and control  HTN: in shock: now on vasopressors:   A fibrillation: on ac:   GI Bleed: per gi :  DW  staff: PMD  pt is critically ill now:    7/18:    left sided pleural effusion:: transudative pl effusion with no with: certainly not the source for MRSA in blood: ?source for MRSA: for molly on monday: he is off sedation   Ac hypercarbic resp fialure: now intubated s/p acls protocol last night -s/p cardiac arrest: ROSC 10 mts:   Acute cva: noted on ct scan head: neurology following: already on heparin   CAD: cont per cards: still on vasopressors: wean as tolerated ? needs molly ro tule out IE : would defer to cards: Cont antibiotics:   CABG: cont current meds  ESRD: on HD   DM: monitor and control  HTN: in shock: now on vasopressors:   A fibrillation: on ac:   GI Bleed: per gi :  pt is critically ill now:    7/19:      left sided pleural effusion:: transudative pl effusion with no with: certainly not the source for MRSA in blood: ?source for MRSA: for molly today: he is off sedation but still on pressors:   Ac hypercarbic resp fialure: now intubated s/p acls protocol last night -s/p cardiac arrest: ROSC 10 mts:   Acute cva: noted on ct scan head: neurology following: already on heparin   CAD: cont per cards: still on vasopressors: wean as tolerated ? needs molly ro tule out IE : would defer to cards: Cont antibiotics: for molly today  CABG: cont current meds  ESRD: on HD   DM: monitor and control  HTN: in shock: now on vasopressors:   A fibrillation: on ac:   GI Bleed: per gi :  pt is critically ill now:    7/20  Acute Hypercarbic Respiratory Failure  -S/p RRT, tx to MICU 7/8, intubated 7/13 s/p cardiac arrest w/ ROSC  -CPAP trials, ventilator management per MICU team  -Pulmonary toileting, suction PRN   L pleural effusion   -Loculated L pleural effusion on CT chest, s/p CT placement by thoracic 7/8  -Transudative effusion, Cx negative   -CT since removed   -Repeat CT chest with small b/l partially loculated effusions, adjacent atelectasis  Bacteremia  -BC 7/9 MRSA+  -ABX per ID  -Repeat BC NGTD   -F/u cardiology recs regarding MOLLY   CAD (s/p CABG) - per cards   ESRD - HD per renal   GI bleed - GI recs noted     7/21  Acute Hypercarbic Respiratory Failure  -S/p RRT, tx to MICU 7/8, intubated 7/13 s/p cardiac arrest w/ ROSC  -CPAP trials, ventilator management per MICU team  -Pulmonary toileting, suction PRN   L pleural effusion   -Loculated L pleural effusion on CT chest, s/p CT placement by thoracic 7/8  -Transudative effusion, Cx negative   -CT since removed   -Repeat CT chest with small b/l partially loculated effusions, adjacent atelectasis  Bacteremia  -BC 7/9 MRSA+  -ABX per ID  -Repeat BC NGTD   -MOLLY now with MV vegetation per cards  -Pending CT lumbar spine   -F/u CT surgery recs  CAD (s/p CABG) - per cards   ESRD - HD per renal   GI bleed - GI recs noted     7/22  Acute Hypercarbic Respiratory Failure  --S/p RRT, tx to MICU 7/8, intubated 7/13 s/p cardiac arrest w/ ROSC-10 mts  --CPAP trials, ventilator management per MICU team  --Pulmonary toileting, suction PRN   L pleural effusion   --Loculated L pleural effusion on CT chest, s/p CT placement by thoracic 7/8  --Transudative effusion, Cx negative   --CT since removed   --Repeat CT chest with small b/l partially loculated effusions, adjacent atelectasis  Bacteremia  --BC 7/9 MRSA+, repeat BC NGTD   --MOLLY now with MV vegetation, not a candidate for surgery per CTS  --CT lumbar spine with no evidence of discitis   --ABX per ID  DVT prophylaxis  --AC held due to drop in H/H  --Concern for R thigh hematoma, vascular recs noted   CAD (s/p CABG) - per cards   ESRD - HD per renal   GI bleed - resolved, GI recs noted   Care per MICU team     7/23  Acute Hypercarbic Respiratory Failure  --S/p RRT, tx to MICU 7/8, intubated 7/13 s/p cardiac arrest w/ ROSC-10 mts  --Tolerating CPAP trials, plan to extubate per RN   --Ventilator management per MICU team  --Pulmonary toileting, suction PRN   L pleural effusion   --Loculated L pleural effusion on CT chest, s/p CT placement by thoracic 7/8  --Transudative effusion, Cx negative   --CT since removed   --Repeat CT chest with small b/l partially loculated effusions, adjacent atelectasis  Bacteremia  --BC 7/9 MRSA+, repeat BC NGTD   --MOLLY with MV vegetation, not a candidate for surgery per CTS  --CT lumbar spine with no evidence of discitis   --ABX per ID  DVT prophylaxis  --AC held due to drop in H/H  --Concern for R thigh hematoma, vascular recs noted   CAD (s/p CABG) - per cards   ESRD - HD per renal   GI bleed - resolved, GI recs noted   Care per MICU team     7/24:    Acute Hypercarbic Respiratory Failure : S/p RRT, tx to MICU 7/8, intubated 7/13 s/p cardiac arrest w/ ROSC-10 mts extubated now: alert and awake:   L pleural effusion : Loculated L pleural effusion on CT chest, s/p CT placement by thoracic 7/8: Transudative effusion, Cx negative : CT since removed : Repeat CT chest with small b/l partially loculated effusions, adjacent atelectasis  Bacteremia: -serratia: now: ij new blood cultures cont antibiotics : MOLLY with MV vegetation, not a candidate for surgery per CTS  --CT lumbar spine with no evidence of discitis   --ABX per ID  DVT prophylaxis : has hematoma in adductionr muscles: AC held due to drop in H/H  CAD (s/p CABG) - per cards   ESRD - HD per renal   GI bleed - resolved, GI recs noted   Care per MICU team : della resident today     7/25:    Acute Hypercarbic Respiratory Failure : S/p RRT, tx to MICU 7/8, intubated 7/13 s/p cardiac arrest w/ ROSC-10 mts extubated now: todays he is lethargic abg being done:   L pleural effusion : Loculated L pleural effusion on CT chest, s/p CT placement by thoracic 7/8: Transudative effusion, Cx negative : CT since removed : Repeat CT chest with small b/l partially loculated effusions, adjacent atelectasis  Bacteremia: -serratia: now: ij new blood cultures cont antibiotics : MOLLY with MV vegetation, not a candidate for surgery per CTS: CT lumbar spine with no evidence of discitis : ABX per ID  DVT prophylaxis : has hematoma in adductor muscles: AC held due to drop in H/H  CAD (s/p CABG) - per cards   ESRD - HD per renal   GI bleed - resolved, GI recs noted   Care per MICU team : della resident today too!    7/26:    Acute Hypercarbic Respiratory Failure : S/p RRT, tx to MICU 7/8, intubated 7/13 s/p cardiac arrest w/ ROSC-10 mts extubated now: todays he is lethargic abg from yesterday  noted: pretty reasonable   L pleural effusion : Loculated L pleural effusion on CT chest, s/p CT placement by thoracic 7/8: Transudative effusion, Cx negative : CT since removed : Repeat CT chest with small b/l partially loculated effusions, adjacent atelectasis  Bacteremia: -serratia: now: ij new blood cultures cont antibiotics : MOLLY with MV vegetation, not a candidate for surgery per CTS: CT lumbar spine with no evidence of discitis : ABX per ID  DVT prophylaxis : has hematoma in adductor muscles: AC held due to drop in H/H  CAD (s/p CABG) - per cards   ESRD - HD per renal   GI bleed - resolved, GI recs noted   Care per MICU team :

## 2021-07-26 NOTE — PROGRESS NOTE ADULT - SUBJECTIVE AND OBJECTIVE BOX
Date of Service: 07-26-21 @ 14:14    Patient is a 70y old  Male who presents with a chief complaint of Chest pain and GIB (26 Jul 2021 12:58)      Any change in ROS: doing same: extubated: not able to respond much     MEDICATIONS  (STANDING):  atorvastatin 40 milliGRAM(s) Oral at bedtime  cefepime   IVPB 1000 milliGRAM(s) IV Intermittent daily  chlorhexidine 2% Cloths 1 Application(s) Topical daily  dextrose 40% Gel 15 Gram(s) Oral once  dextrose 40% Gel 15 Gram(s) Oral once  dextrose 5%. 1000 milliLiter(s) (50 mL/Hr) IV Continuous <Continuous>  dextrose 5%. 1000 milliLiter(s) (100 mL/Hr) IV Continuous <Continuous>  dextrose 50% Injectable 25 Gram(s) IV Push once  dextrose 50% Injectable 12.5 Gram(s) IV Push once  dextrose 50% Injectable 25 Gram(s) IV Push once  diltiazem    Tablet 30 milliGRAM(s) Oral four times a day  epoetin danilo-epbx (RETACRIT) Injectable 91288 Unit(s) IV Push <User Schedule>  glucagon  Injectable 1 milliGRAM(s) IntraMuscular once  insulin lispro (ADMELOG) corrective regimen sliding scale   SubCutaneous every 6 hours  midodrine 20 milliGRAM(s) Oral every 8 hours  norepinephrine Infusion 0.05 MICROgram(s)/kG/Min (8.08 mL/Hr) IV Continuous <Continuous>  pantoprazole    Tablet 40 milliGRAM(s) Oral two times a day  vancomycin  IVPB 750 milliGRAM(s) IV Intermittent <User Schedule>    MEDICATIONS  (PRN):  acetaminophen   Tablet .. 1000 milliGRAM(s) Oral every 6 hours PRN Temp greater or equal to 38C (100.4F), Mild Pain (1 - 3), Moderate Pain (4 - 6)  calamine/zinc oxide Lotion 1 Application(s) Topical three times a day PRN Itching    Vital Signs Last 24 Hrs  T(C): 35.6 (26 Jul 2021 12:00), Max: 37.3 (25 Jul 2021 20:00)  T(F): 96 (26 Jul 2021 12:00), Max: 99.2 (25 Jul 2021 20:00)  HR: 74 (26 Jul 2021 14:00) (68 - 94)  BP: 110/52 (26 Jul 2021 14:00) (104/72 - 146/68)  BP(mean): 65 (26 Jul 2021 14:00) (47 - 97)  RR: 31 (26 Jul 2021 14:00) (20 - 31)  SpO2: 95% (26 Jul 2021 14:00) (88% - 100%)    I&O's Summary    25 Jul 2021 07:01  -  26 Jul 2021 07:00  --------------------------------------------------------  IN: 485.2 mL / OUT: 2900 mL / NET: -2414.8 mL          Physical Exam:   GENERAL: NAD, well-groomed, well-developed  HEENT: KATHY/   Atraumatic, Normocephalic  ENMT: No tonsillar erythema, exudates, or enlargement; Moist mucous membranes, Good dentition, No lesions  NECK: Supple, No JVD, Normal thyroid  CHEST/LUNG: Clear to auscultaion  CVS: Regular rate and rhythm; No murmurs, rubs, or gallops  GI: : Soft, Nontender, Nondistended; Bowel sounds present  NERVOUS SYSTEM: Lethargic but open his eyes:   EXTREMITIES:  - edema  LYMPH: No lymphadenopathy noted  SKIN: No rashes or lesions  ENDOCRINOLOGY: No Thyromegaly  PSYCH: calm+    Labs:  ABG - ( 25 Jul 2021 13:32 )  pH, Arterial: 7.38  pH, Blood: x     /  pCO2: 50    /  pO2: 126   / HCO3: 28    / Base Excess: 4.4   /  SaO2: 99.2                                        8.0    15.07 )-----------( 266      ( 26 Jul 2021 04:25 )             27.2                         7.2    13.20 )-----------( 221      ( 25 Jul 2021 04:09 )             24.3                         7.8    16.34 )-----------( 262      ( 24 Jul 2021 03:25 )             26.5                         7.9    22.20 )-----------( 398      ( 23 Jul 2021 01:31 )             26.0                         7.8    20.86 )-----------( 341      ( 22 Jul 2021 15:31 )             25.6     07-26    134<L>  |  93<L>  |  36<H>  ----------------------------<  140<H>  3.7   |  25  |  5.06<H>  07-25    136  |  97<L>  |  26<H>  ----------------------------<  170<H>  3.7   |  26  |  4.13<H>  07-24    134<L>  |  92<L>  |  40<H>  ----------------------------<  190<H>  4.9   |  25  |  5.33<H>  07-23    134<L>  |  93<L>  |  27<H>  ----------------------------<  127<H>  4.2   |  22  |  4.11<H>    Ca    9.4      26 Jul 2021 04:25  Ca    9.1      25 Jul 2021 04:09  Phos  4.7     07-26  Phos  4.1     07-25  Mg     2.10     07-26  Mg     2.00     07-25    TPro  6.4  /  Alb  2.5<L>  /  TBili  2.0<H>  /  DBili  x   /  AST  63<H>  /  ALT  27  /  AlkPhos  310<H>  07-26  TPro  5.8<L>  /  Alb  2.2<L>  /  TBili  1.4<H>  /  DBili  x   /  AST  51<H>  /  ALT  19  /  AlkPhos  219<H>  07-25  TPro  5.8<L>  /  Alb  2.2<L>  /  TBili  1.3<H>  /  DBili  x   /  AST  78<H>  /  ALT  17  /  AlkPhos  211<H>  07-24  TPro  5.8<L>  /  Alb  2.6<L>  /  TBili  1.1  /  DBili  x   /  AST  42<H>  /  ALT  9   /  AlkPhos  141<H>  07-23    CAPILLARY BLOOD GLUCOSE      POCT Blood Glucose.: 168 mg/dL (26 Jul 2021 11:33)  POCT Blood Glucose.: 151 mg/dL (26 Jul 2021 05:11)  POCT Blood Glucose.: 152 mg/dL (25 Jul 2021 23:36)  POCT Blood Glucose.: 164 mg/dL (25 Jul 2021 17:57)      LIVER FUNCTIONS - ( 26 Jul 2021 04:25 )  Alb: 2.5 g/dL / Pro: 6.4 g/dL / ALK PHOS: 310 U/L / ALT: 27 U/L / AST: 63 U/L / GGT: x           PTT - ( 26 Jul 2021 04:25 )  PTT:30.2 sec          RECENT CULTURES:  07-24 @ 18:47 .Blood Blood-Peripheral       r< from: CT Angio Lower Extremity w/ IV Cont, Bilateral (07.21.21 @ 13:36) >  calves were also obtained. Sagittal and coronal reformats as well as 3D reconstructions were performed.    FINDINGS:    CENTRAL ARTERIAL SYSTEM:    Marked atherosclerotic calcifications of the aortoiliac system and of the aortic branch vessels. The branch vessels are patent.    RIGHT LOWER EXTREMITY: Small knee joint effusion, marked subcutaneous edema. Surgical clips medially in the right leg. A 10.5 cm hematoma with fluid and hemorrhage level within the adductor muscles. No evidence of active extravasation.    Multilevel mildto moderate stenosis of the femoral and popliteal arteries. The arteries of the lower leg are not well evaluated due to extensive calcifications. Tibioperoneal trunk is patent.    LEFT LOWER EXTREMITY: Small knee joint effusion, marked subcutaneous edema.    Multilevel mild to moderate stenosis of the femoral and popliteal arteries. The arteries of the lower leg are not well evaluated due to extensive calcifications. Tibioperoneal trunk is patent. Stent in the distal femoral artery.    ADDITIONALFINDINGS: Reflux of contrast into distended IVC and hepatic veins. Enteric catheter in mid gastric lumen. Marked anasarca. Small-to-moderate ascites. Atrophic kidneys.    IMPRESSION:  A moderate-sized hematoma in the right adductor muscle group. No active extravasation on arterial phase.  Multilevel mild to moderate stenosis of the femoral and popliteal arteries bilaterally.        --- End of Report ---              MIKE NOLAN MD; Attending Radiologist  This document has been electronically signed. Jul 21 2021  2:16PM    < end of copied text >           No growth to date.    07-24 @ 18:45 .Blood Blood-Peripheral       Growth in aerobic bottle: Gram Negative Rods  Growth in anaerobic bottle: Gram Negative Rods           Growth in aerobic bottle: Gram Negative Rods  Growth in anaerobic bottle: Gram Negative Rods    07-22 @ 17:28 .Blood Blood   PCR    Growth in aerobic bottle: Gram Negative Rods    Blood Culture PCR  Serratia marcescens  Blood Culture PCR     Growth in aerobic bottle: Serratia marcescens  ***Blood Panel PCR results on this specimen are available  approximately 3 hours after the Gram stain result.***  Gram stain, PCR, and/or culture results may not always  correspond due to difference inmethodologies.  ************************************************************  This PCR assay was performed by multiplex PCR. This  Assay tests for 66 bacterial and resistance gene targets.  Please refer to the [facility] test directory  [email or web address] for details.          RESPIRATORY CULTURES:          Studies  Chest X-RAY  CT SCAN Chest   Venous Dopplers: LE:   CT Abdomen  Others

## 2021-07-26 NOTE — PROGRESS NOTE ADULT - SUBJECTIVE AND OBJECTIVE BOX
Date of service 7/26/21    CARDIOLOGY     tele -  NSR     S: lethargic; ros limited but otherwise negative; pt. seen and examined on 07/26/21    Review of Systems:   Constitutional: [ ] fevers, [ ] chills.   Skin: [ ] dry skin. [ ] rashes.  Psychiatric: [ ] depression, [ ] anxiety.   Gastrointestinal: [ ] BRBPR, [ ] melena.   Neurological: [ ] confusion. [ ] seizures. [ ] shuffling gait.   Ears,Nose,Mouth and Throat: [ ] ear pain [ ] sore throat.   Eyes: [ ] diplopia.   Respiratory: [ ] hemoptysis. [ ] shortness of breath  Cardiovascular: See HPI above  Hematologic/Lymphatic: [ ] anemia. [ ] painful nodes. [ ] prolonged bleeding.   Genitourinary: [ ] hematuria. [ ] flank pain.   Endocrine: [ ] significant change in weight. [ ] intolerance to heat and cold.     Review of systems [ ] otherwise negative, [ x] otherwise unable to obtain    FH: no family history of sudden cardiac death in first degree relatives    SH: [ ] tobacco, [ ] alcohol, [ ] drugs         acetaminophen   Tablet .. 1000 milliGRAM(s) Oral every 6 hours PRN  atorvastatin 40 milliGRAM(s) Oral at bedtime  calamine/zinc oxide Lotion 1 Application(s) Topical three times a day PRN  cefepime   IVPB 1000 milliGRAM(s) IV Intermittent daily  chlorhexidine 2% Cloths 1 Application(s) Topical daily  dextrose 40% Gel 15 Gram(s) Oral once  dextrose 40% Gel 15 Gram(s) Oral once  dextrose 5%. 1000 milliLiter(s) IV Continuous <Continuous>  dextrose 5%. 1000 milliLiter(s) IV Continuous <Continuous>  dextrose 50% Injectable 25 Gram(s) IV Push once  dextrose 50% Injectable 12.5 Gram(s) IV Push once  dextrose 50% Injectable 25 Gram(s) IV Push once  diltiazem    Tablet 30 milliGRAM(s) Oral four times a day  epoetin danilo-epbx (RETACRIT) Injectable 57138 Unit(s) IV Push <User Schedule>  glucagon  Injectable 1 milliGRAM(s) IntraMuscular once  insulin lispro (ADMELOG) corrective regimen sliding scale   SubCutaneous every 6 hours  midodrine 20 milliGRAM(s) Oral every 8 hours  pantoprazole    Tablet 40 milliGRAM(s) Oral two times a day  vancomycin  IVPB 750 milliGRAM(s) IV Intermittent <User Schedule>                            8.6    11.40 )-----------( 266      ( 27 Jul 2021 03:53 )             29.6       07-27    138  |  96<L>  |  45<H>  ----------------------------<  123<H>  4.2   |  23  |  6.08<H>    Ca    9.1      27 Jul 2021 03:53  Phos  4.8     07-27  Mg     2.20     07-27    TPro  6.2  /  Alb  2.3<L>  /  TBili  2.0<H>  /  DBili  x   /  AST  67<H>  /  ALT  32  /  AlkPhos  340<H>  07-27            T(C): 36.7 (07-27-21 @ 12:00), Max: 37.2 (07-27-21 @ 04:00)  HR: 100 (07-27-21 @ 15:00) (66 - 100)  BP: 140/101 (07-27-21 @ 15:00) (64/52 - 142/40)  RR: 31 (07-27-21 @ 15:00) (19 - 31)  SpO2: 98% (07-27-21 @ 15:00) (89% - 100%)  Wt(kg): --    I&O's Summary    27 Jul 2021 07:01  -  27 Jul 2021 16:51  --------------------------------------------------------  IN: 506 mL / OUT: 3400 mL / NET: -2894 mL          General:   appears comfortable  Head: Normocephalic and atraumatic.   Neck: No JVD. No bruits. Supple. Does not appear to be enlarged.   Cardiovascular: + S1,S2 ; RRR Soft systolic murmur at the left lower sternal border. No rubs noted.    Lungs: decreased BS BL  Abdomen: + BS, soft. Non tender. Non distended. No rebound. No guarding.   Extremities: No clubbing/cyanosis/edema.   Neurologic: unable to assess  Skin: Warm and moist. The patient's skin has normal elasticity and good skin turgor.   Psychiatric: unable to assess   Musculoskeletal: unable to assess    DATA  < from: Transthoracic Echocardiogram (07.07.21 @ 18:17) >  ------------------------------------------------------------------------  CONCLUSIONS:  1. Mitral annular calcification, otherwise normal mitral  valve. Minimal mitral regurgitation.  2. Endocardium not well visualized; grossly normal left  ventricular systolic function.  3. Right ventricular enlargement with decreased right  ventricular systolic function.  4. Inadequate tricuspid regurgitation Doppler envelope  precludes estimation of RVSP.  ------------------------------------------------------------------------  Confirmed on  7/7/2021 - 21:16:20 by Osvaldo Bertrand M.D.,  [facility], [facility]    < end of copied text >      A/P) 71 y/o male PMH CAD s/p CABG and old PCI, AFL x 1 year on coumadin, HTN, hyperlipidemia, DM, ESRD on HD, and hypothyroidism originally admitted to [facility] with GIB, transferred to [facility], found to have septic shock and MRSA bacteremia, trx to MICU s/p PEA arrest, intubated and now extubated    -Given MRSA bacteremia, TRUMAN performed demonstrating MV endocarditis - discussed with CTS Dr. Pierce - pt. not a surgical candidate at this time   -Abx per ID, blood cx now + for serratia so zosyn added  -CTH head noted - pt. with cva - ac was restarted but now held due to dropping h/h   -Right thigh hematoma noted on CT scan - appreciate vascular evaluation - no surgical intervention required by vascular at this time - monitor h/h  -Supportive care per MICU  -Follow up palliative care    Kalie Lau MD

## 2021-07-26 NOTE — PROGRESS NOTE ADULT - ASSESSMENT
70 year old with ESRD, DM, CAD presented with anemia and chest pain    1) MRSA bacteremia  associated endocarditis  Not a surgical candidate    Continue vancomycin 750 mg on HD days through 8/22/2021    2) Serratia bacteremia  ? focus  Serratia is a spice organism - sierraern re inducable amp c resistance with beta lactams  Can change to CIpro 400 mg IV daily    Check urine Cx  LFTS are elevated- alk phos/ t bili  Consider abd imaging- if RUQ sonogram is normal - Consider CT a/p to look for focus of serratia bacteremia    3) ESRD  abx  dose adjusted    Case and Plan d/w ICU

## 2021-07-26 NOTE — PROGRESS NOTE ADULT - SUBJECTIVE AND OBJECTIVE BOX
SUBJECTIVE AND OBJECTIVE:  pt extubated.  denies pain, sob.  lethargic.  appears comfortable.  remains on low dose pressors  INTERVAL HPI/OVERNIGHT EVENTS:    DNR on chart:   Allergies    lisinopril (Other)  opioid-like analgesics (Other)    Intolerances    MEDICATIONS  (STANDING):  atorvastatin 40 milliGRAM(s) Oral at bedtime  cefepime   IVPB 1000 milliGRAM(s) IV Intermittent daily  chlorhexidine 2% Cloths 1 Application(s) Topical daily  dextrose 40% Gel 15 Gram(s) Oral once  dextrose 40% Gel 15 Gram(s) Oral once  dextrose 5%. 1000 milliLiter(s) (50 mL/Hr) IV Continuous <Continuous>  dextrose 5%. 1000 milliLiter(s) (100 mL/Hr) IV Continuous <Continuous>  dextrose 50% Injectable 25 Gram(s) IV Push once  dextrose 50% Injectable 12.5 Gram(s) IV Push once  dextrose 50% Injectable 25 Gram(s) IV Push once  diltiazem    Tablet 30 milliGRAM(s) Oral four times a day  epoetin danilo-epbx (RETACRIT) Injectable 04476 Unit(s) IV Push <User Schedule>  glucagon  Injectable 1 milliGRAM(s) IntraMuscular once  insulin lispro (ADMELOG) corrective regimen sliding scale   SubCutaneous every 6 hours  midodrine 20 milliGRAM(s) Oral every 8 hours  norepinephrine Infusion 0.05 MICROgram(s)/kG/Min (8.08 mL/Hr) IV Continuous <Continuous>  pantoprazole    Tablet 40 milliGRAM(s) Oral two times a day  vancomycin  IVPB 750 milliGRAM(s) IV Intermittent <User Schedule>    MEDICATIONS  (PRN):  acetaminophen   Tablet .. 1000 milliGRAM(s) Oral every 6 hours PRN Temp greater or equal to 38C (100.4F), Mild Pain (1 - 3), Moderate Pain (4 - 6)  calamine/zinc oxide Lotion 1 Application(s) Topical three times a day PRN Itching      ITEMS UNCHECKED ARE NOT PRESENT    PRESENT SYMPTOMS: [x ]Unable to obtain due to poor mentation   Source if other than patient:  [ ]Family   [ ]Team     Pain (Impact on QOL):    Location:  Minimal acceptable level (0-10 scale):            Aggravating factors:  Quality:  Radiation:  Severity (0-10 scale):    Timing:    Dyspnea:                           [ ]Mild [ ]Moderate [ ]Severe  Anxiety:                             [ ]Mild [ ]Moderate [ ]Severe  Fatigue:                             [ ]Mild [ ]Moderate [ ]Severe  Nausea:                             [ ]Mild [ ]Moderate [ ]Severe  Loss of appetite:              [ ]Mild [ ]Moderate [ ]Severe  Constipation:                    [ ]Mild [ ]Moderate [ ]Severe    PAIN AD Score:	  [email or web address]/[email or web address] (Ctrl + left click to view)    Other Symptoms:  [ ]All other review of systems negative     Karnofsky Performance Score/Palliative Performance Status Version 2:      30   %    http://palliative.info/resource_material/[email or web address].pdf  PHYSICAL EXAM:  Vital Signs Last 24 Hrs  T(C): 35.6 (26 Jul 2021 12:00), Max: 37.3 (25 Jul 2021 20:00)  T(F): 96 (26 Jul 2021 12:00), Max: 99.2 (25 Jul 2021 20:00)  HR: 72 (26 Jul 2021 15:00) (68 - 94)  BP: 150/115 (26 Jul 2021 15:00) (104/72 - 150/115)  BP(mean): 124 (26 Jul 2021 15:00) (47 - 124)  RR: 27 (26 Jul 2021 15:00) (20 - 31)  SpO2: 98% (26 Jul 2021 15:00) (88% - 100%) I&O's Summary    25 Jul 2021 07:01  -  26 Jul 2021 07:00  --------------------------------------------------------  IN: 485.2 mL / OUT: 2900 mL / NET: -2414.8 mL     GENERAL:  [ ]Alert  [ ]Oriented x   [ x]Lethargic  [ ]Cachexia  [ ]Unarousable  [ ]Verbal  [ ]Non-Verbal    Behavioral:   [ ] Anxiety  [x ] Delirium [ ] Agitation [ ] Other    HEENT:  [x ]Normal   [ ]Dry mouth   [ ]ET Tube/Trach  [ ]Oral lesions    PULMONARY:   [x ]Clear [ ]Tachypnea  [ ]Audible excessive secretions   [ ]Rhonchi        [ ]Right [ ]Left [ ]Bilateral  [ ]Crackles        [ ]Right [ ]Left [ ]Bilateral  [ ]Wheezing     [ ]Right [ ]Left [ ]Bilateral    CARDIOVASCULAR:    [x ]Regular [ ]Irregular [ ]Tachy  [ ]Gm [ ]Murmur [ ]Other    GASTROINTESTINAL:  [ x]Soft  [ x]Distended   [ ]+BS  [ x]Non tender [ ]Tender  [ ]PEG [ ]OGT/ NGT   Last BM:  GENITOURINARY:  [ ]Normal [ ] Incontinent   [xx ]Oliguria/Anuria   [ ]Madrigal    MUSCULOSKELETAL:   [ ]Normal   [ ]Weakness  [x ]Bed/Wheelchair bound [ ]Edema    NEUROLOGIC:   [ ]No focal deficits  [x ] Cognitive impairment  [x ] Dysphagia [ ]Dysarthria [ ] Paresis [ ]Other     SKIN:   [ ]Normal   [ x]Pressure ulcer(s)  [ ]Rash    CRITICAL CARE:  [ ] Shock Present  [ ]Septic [ ]Cardiogenic [ ]Neurologic [ ]Hypovolemic  [ ]  Vasopressors [ ]  Inotropes   [ ] Respiratory failure present  [ ] Acute  [ ] Chronic [ ] Hypoxic  [ ] Hypercarbic [ ] Other  [ ] Other organ failure     LABS:                        8.0    15.07 )-----------( 266      ( 26 Jul 2021 04:25 )             27.2   07-26    134<L>  |  93<L>  |  36<H>  ----------------------------<  140<H>  3.7   |  25  |  5.06<H>    Ca    9.4      26 Jul 2021 04:25  Phos  4.7     07-26  Mg     2.10     07-26    TPro  6.4  /  Alb  2.5<L>  /  TBili  2.0<H>  /  DBili  x   /  AST  63<H>  /  ALT  27  /  AlkPhos  310<H>  07-26  PTT - ( 26 Jul 2021 04:25 )  PTT:30.2 sec      RADIOLOGY & ADDITIONAL STUDIES:    Protein Calorie Malnutrition Present: [ ] yes [ ] no  [ ] PPSV2 < or = 30%  [ ] significant weight loss [ ] poor nutritional intake [ ] anasarca [ ] catabolic state Artificial Nutrition [ ]     REFERRALS:   [ ]Chaplaincy  [ ] Hospice  [ ]Child Life  [ ]Social Work  [ ]Case management [ ]Holistic Therapy   Goals of Care Document:

## 2021-07-26 NOTE — PROGRESS NOTE ADULT - SUBJECTIVE AND OBJECTIVE BOX
[facility] - S Eugene / Marlena S /D Cruz/ S Narendra/ WILY Macias/ Alexandre Esposito / WILMER Hermosillou/ O Jessi  service -[phone], office [phone]  ---------------------------------------------------------------------------------------------------------------    Patient seen and examined bedside    Subjective and Objective: No overnight events, sob resolved. No complaints today. feeling better    Allergies: lisinopril (Other)  opioid-like analgesics (Other)      Hospital Medications:   MEDICATIONS  (STANDING):  atorvastatin 40 milliGRAM(s) Oral at bedtime  cefepime   IVPB 1000 milliGRAM(s) IV Intermittent daily  chlorhexidine 2% Cloths 1 Application(s) Topical daily  dextrose 40% Gel 15 Gram(s) Oral once  dextrose 40% Gel 15 Gram(s) Oral once  dextrose 5%. 1000 milliLiter(s) (50 mL/Hr) IV Continuous <Continuous>  dextrose 5%. 1000 milliLiter(s) (100 mL/Hr) IV Continuous <Continuous>  dextrose 50% Injectable 25 Gram(s) IV Push once  dextrose 50% Injectable 12.5 Gram(s) IV Push once  dextrose 50% Injectable 25 Gram(s) IV Push once  diltiazem    Tablet 30 milliGRAM(s) Oral four times a day  epoetin danilo-epbx (RETACRIT) Injectable 32372 Unit(s) IV Push <User Schedule>  glucagon  Injectable 1 milliGRAM(s) IntraMuscular once  insulin lispro (ADMELOG) corrective regimen sliding scale   SubCutaneous every 6 hours  midodrine 20 milliGRAM(s) Oral every 8 hours  norepinephrine Infusion 0.05 MICROgram(s)/kG/Min (8.08 mL/Hr) IV Continuous <Continuous>  pantoprazole    Tablet 40 milliGRAM(s) Oral two times a day  vancomycin  IVPB 750 milliGRAM(s) IV Intermittent <User Schedule>      REVIEW OF SYSTEMS:  CONSTITUTIONAL: No weakness, fevers or chills  EYES/ENT: No visual changes;  No vertigo or throat pain   NECK: No pain or stiffness  RESPIRATORY: No cough, wheezing, hemoptysis; No shortness of breath  CARDIOVASCULAR: No chest pain or palpitations.  GASTROINTESTINAL: No abdominal or epigastric pain. No nausea, vomiting, or hematemesis; No diarrhea or constipation. No melena or hematochezia.  GENITOURINARY: No dysuria, frequency, foamy urine, urinary urgency, incontinence or hematuria  NEUROLOGICAL: No numbness or weakness  SKIN: No itching, burning, rashes, or lesions   VASCULAR: No bilateral lower extremity edema.   All other review of systems is negative unless indicated above.    VITALS:  T(F): 97.5 (07-26-21 @ 08:00), Max: 99.2 (07-25-21 @ 20:00)  HR: 81 (07-26-21 @ 12:00)  BP: 104/72 (07-26-21 @ 12:00)  RR: 26 (07-26-21 @ 12:00)  SpO2: 95% (07-26-21 @ 12:00)  Wt(kg): --    07-25 @ 07:01  -  07-26 @ 07:00  --------------------------------------------------------  IN: 485.2 mL / OUT: 2900 mL / NET: -2414.8 mL          PHYSICAL EXAM:  Constitutional: NAD  HEENT: anicteric sclera, oropharynx clear  Neck: No JVD  Respiratory: CTAB, no wheezes, rales or rhonchi  Cardiovascular: S1, S2, RRR  Gastrointestinal: BS+, soft, NT/ND  Extremities: No cyanosis or clubbing. No peripheral edema  Neurological: A/O x 3, no focal deficits  Psychiatric: Normal mood, normal affect  : No CVA tenderness. No wagner.   Skin: No rashes  Vascular Access:    LABS:  07-26    134<L>  |  93<L>  |  36<H>  ----------------------------<  140<H>  3.7   |  25  |  5.06<H>    Ca    9.4      26 Jul 2021 04:25  Phos  4.7     07-26  Mg     2.10     07-26    TPro  6.4  /  Alb  2.5<L>  /  TBili  2.0<H>  /  DBili      /  AST  63<H>  /  ALT  27  /  AlkPhos  310<H>  07-26    Creatinine Trend: 5.06 <--, 4.13 <--, 5.33 <--, 4.11 <--, 5.90 <--, 4.75 <--, 6.78 <--                        8.0    15.07 )-----------( 266      ( 26 Jul 2021 04:25 )             27.2     Urine Studies:        RADIOLOGY & ADDITIONAL STUDIES:   [facility] - S Eugene / Marlena S /D Cruz/ S Narendra/ WILY Macias/ Alexandre Esposito / WILMER Hermosillou/ O Jessi  service -[phone], office [phone]  ---------------------------------------------------------------------------------------------------------------    Patient seen and examined bedside in MICU    Subjective and Objective: No overnight events, no complaints today. more alert today    Allergies: lisinopril (Other)  opioid-like analgesics (Other)      Hospital Medications:   MEDICATIONS  (STANDING):  atorvastatin 40 milliGRAM(s) Oral at bedtime  cefepime   IVPB 1000 milliGRAM(s) IV Intermittent daily  chlorhexidine 2% Cloths 1 Application(s) Topical daily  dextrose 40% Gel 15 Gram(s) Oral once  dextrose 40% Gel 15 Gram(s) Oral once  dextrose 5%. 1000 milliLiter(s) (50 mL/Hr) IV Continuous <Continuous>  dextrose 5%. 1000 milliLiter(s) (100 mL/Hr) IV Continuous <Continuous>  dextrose 50% Injectable 25 Gram(s) IV Push once  dextrose 50% Injectable 12.5 Gram(s) IV Push once  dextrose 50% Injectable 25 Gram(s) IV Push once  diltiazem    Tablet 30 milliGRAM(s) Oral four times a day  epoetin danilo-epbx (RETACRIT) Injectable 20593 Unit(s) IV Push <User Schedule>  glucagon  Injectable 1 milliGRAM(s) IntraMuscular once  insulin lispro (ADMELOG) corrective regimen sliding scale   SubCutaneous every 6 hours  midodrine 20 milliGRAM(s) Oral every 8 hours  norepinephrine Infusion 0.05 MICROgram(s)/kG/Min (8.08 mL/Hr) IV Continuous <Continuous>  pantoprazole    Tablet 40 milliGRAM(s) Oral two times a day  vancomycin  IVPB 750 milliGRAM(s) IV Intermittent <User Schedule>      REVIEW OF SYSTEMS:  unable to obtain     VITALS:  T(F): 97.5 (07-26-21 @ 08:00), Max: 99.2 (07-25-21 @ 20:00)  HR: 81 (07-26-21 @ 12:00)  BP: 104/72 (07-26-21 @ 12:00)  RR: 26 (07-26-21 @ 12:00)  SpO2: 95% (07-26-21 @ 12:00)  Wt(kg): --    07-25 @ 07:01  -  07-26 @ 07:00  --------------------------------------------------------  IN: 485.2 mL / OUT: 2900 mL / NET: -2414.8 mL      PHYSICAL EXAM:  Constitutional: NAD  HEENT: anicteric sclera  Neck: No JVD  Respiratory: CTAB  Cardiovascular: S1, S2, RRR  Gastrointestinal: BS+, soft, NT/ND  Extremities: 2+ peripheral edema b/l   neuro:  awake  : No wagner.   WES AVF+thrill     LABS:  07-26    134<L>  |  93<L>  |  36<H>  ----------------------------<  140<H>  3.7   |  25  |  5.06<H>    Ca    9.4      26 Jul 2021 04:25  Phos  4.7     07-26  Mg     2.10     07-26    TPro  6.4  /  Alb  2.5<L>  /  TBili  2.0<H>  /  DBili      /  AST  63<H>  /  ALT  27  /  AlkPhos  310<H>  07-26    Creatinine Trend: 5.06 <--, 4.13 <--, 5.33 <--, 4.11 <--, 5.90 <--, 4.75 <--, 6.78 <--                        8.0    15.07 )-----------( 266      ( 26 Jul 2021 04:25 )             27.2     Urine Studies:        RADIOLOGY & ADDITIONAL STUDIES:   [facility] - S Eugene / Marlena S /D Cruz/ S Narendra/ WILY Macias/ Alexandre Esposito / WILMER Hermosillou/ O Jessi  service -[phone], office [phone]  ---------------------------------------------------------------------------------------------------------------    Patient seen and examined bedside in MICU    Subjective and Objective: No overnight events, no complaints today. more alert today    Allergies: lisinopril (Other)  opioid-like analgesics (Other)      Hospital Medications:   MEDICATIONS  (STANDING):  atorvastatin 40 milliGRAM(s) Oral at bedtime  cefepime   IVPB 1000 milliGRAM(s) IV Intermittent daily  chlorhexidine 2% Cloths 1 Application(s) Topical daily  dextrose 40% Gel 15 Gram(s) Oral once  dextrose 40% Gel 15 Gram(s) Oral once  dextrose 5%. 1000 milliLiter(s) (50 mL/Hr) IV Continuous <Continuous>  dextrose 5%. 1000 milliLiter(s) (100 mL/Hr) IV Continuous <Continuous>  dextrose 50% Injectable 25 Gram(s) IV Push once  dextrose 50% Injectable 12.5 Gram(s) IV Push once  dextrose 50% Injectable 25 Gram(s) IV Push once  diltiazem    Tablet 30 milliGRAM(s) Oral four times a day  epoetin danilo-epbx (RETACRIT) Injectable 36737 Unit(s) IV Push <User Schedule>  glucagon  Injectable 1 milliGRAM(s) IntraMuscular once  insulin lispro (ADMELOG) corrective regimen sliding scale   SubCutaneous every 6 hours  midodrine 20 milliGRAM(s) Oral every 8 hours  norepinephrine Infusion 0.05 MICROgram(s)/kG/Min (8.08 mL/Hr) IV Continuous <Continuous>  pantoprazole    Tablet 40 milliGRAM(s) Oral two times a day  vancomycin  IVPB 750 milliGRAM(s) IV Intermittent <User Schedule>      REVIEW OF SYSTEMS:  unable to obtain     VITALS:  T(F): 97.5 (07-26-21 @ 08:00), Max: 99.2 (07-25-21 @ 20:00)  HR: 81 (07-26-21 @ 12:00)  BP: 104/72 (07-26-21 @ 12:00)  RR: 26 (07-26-21 @ 12:00)  SpO2: 95% (07-26-21 @ 12:00)  Wt(kg): --    07-25 @ 07:01  -  07-26 @ 07:00  --------------------------------------------------------  IN: 485.2 mL / OUT: 2900 mL / NET: -2414.8 mL      PHYSICAL EXAM:  Constitutional: NAD  HEENT: anicteric sclera  Neck: No JVD  Respiratory: CTAB  Cardiovascular: S1, S2, RRR  Gastrointestinal: BS+, soft, NT/ND  Extremities: 2+ peripheral edema b/l   neuro:  awake  : No wagner.   WES AVF+thrill     LABS:  07-26    134<L>  |  93<L>  |  36<H>  ----------------------------<  140<H>  3.7   |  25  |  5.06<H>    Ca    9.4      26 Jul 2021 04:25  Phos  4.7     07-26  Mg     2.10     07-26    TPro  6.4  /  Alb  2.5<L>  /  TBili  2.0<H>  /  DBili      /  AST  63<H>  /  ALT  27  /  AlkPhos  310<H>  07-26    Creatinine Trend: 5.06 <--, 4.13 <--, 5.33 <--, 4.11 <--, 5.90 <--, 4.75 <--, 6.78 <--                        8.0    15.07 )-----------( 266      ( 26 Jul 2021 04:25 )             27.2     Urine Studies:        RADIOLOGY & ADDITIONAL STUDIES:

## 2021-07-26 NOTE — PROGRESS NOTE ADULT - SUBJECTIVE AND OBJECTIVE BOX
EP ATTENDING    7/26  tele: NSR, PVCs, no significant events  lethargic, ROS unable to be obtained  extubated and participates in bedside care w/ family.      Review of Systems:   Constitutional: [ ] fevers, [ ] chills.   Skin: [ ] dry skin. [ ] rashes.  Psychiatric: [ ] depression, [ ] anxiety.   Gastrointestinal: [ ] BRBPR, [ ] melena.   Neurological: [ ] confusion. [ ] seizures. [ ] shuffling gait.   Ears,Nose,Mouth and Throat: [ ] ear pain [ ] sore throat.   Eyes: [ ] diplopia.   Respiratory: [ ] hemoptysis. [ ] shortness of breath  Cardiovascular: See HPI above  Hematologic/Lymphatic: [ ] anemia. [ ] painful nodes. [ ] prolonged bleeding.   Genitourinary: [ ] hematuria. [ ] flank pain.   Endocrine: [ ] significant change in weight. [ ] intolerance to heat and cold.     Review of systems [ ] otherwise negative, [x ] otherwise unable to obtain    FH: no family history of sudden cardiac death in first degree relatives    SH: [ ] tobacco, [ ] alcohol, [ ] drugs    acetaminophen   Tablet .. 1000 milliGRAM(s) Oral every 6 hours PRN  atorvastatin 40 milliGRAM(s) Oral at bedtime  calamine/zinc oxide Lotion 1 Application(s) Topical three times a day PRN  cefepime   IVPB 1000 milliGRAM(s) IV Intermittent daily  chlorhexidine 2% Cloths 1 Application(s) Topical daily  dextrose 40% Gel 15 Gram(s) Oral once  dextrose 40% Gel 15 Gram(s) Oral once  dextrose 5%. 1000 milliLiter(s) IV Continuous <Continuous>  dextrose 5%. 1000 milliLiter(s) IV Continuous <Continuous>  dextrose 50% Injectable 25 Gram(s) IV Push once  dextrose 50% Injectable 12.5 Gram(s) IV Push once  dextrose 50% Injectable 25 Gram(s) IV Push once  diltiazem    Tablet 30 milliGRAM(s) Oral four times a day  epoetin danilo-epbx (RETACRIT) Injectable 98973 Unit(s) IV Push <User Schedule>  glucagon  Injectable 1 milliGRAM(s) IntraMuscular once  insulin lispro (ADMELOG) corrective regimen sliding scale   SubCutaneous every 6 hours  midodrine 20 milliGRAM(s) Oral every 8 hours  norepinephrine Infusion 0.05 MICROgram(s)/kG/Min IV Continuous <Continuous>  pantoprazole    Tablet 40 milliGRAM(s) Oral two times a day  vancomycin  IVPB 750 milliGRAM(s) IV Intermittent <User Schedule>                            8.0    15.07 )-----------( 266      ( 26 Jul 2021 04:25 )             27.2       07-26    134<L>  |  93<L>  |  36<H>  ----------------------------<  140<H>  3.7   |  25  |  5.06<H>    Ca    9.4      26 Jul 2021 04:25  Phos  4.7     07-26  Mg     2.10     07-26    TPro  6.4  /  Alb  2.5<L>  /  TBili  2.0<H>  /  DBili  x   /  AST  63<H>  /  ALT  27  /  AlkPhos  310<H>  07-26    T 36.6 (07-26-21 @ 12:00), Max: 37.3 (07-25-21 @ 20:00)  HR: 74 (07-26-21 @ 14:00) (68 - 94)  BP: 110/52 (07-26-21 @ 14:00) (104/72 - 146/68)  RR: 31 (07-26-21 @ 14:00) (20 - 31)  SpO2: 95% (07-26-21 @ 14:00) (88% - 100%)  Wt(kg): --    I&O's Summary    25 Jul 2021 07:01  -  26 Jul 2021 07:00  --------------------------------------------------------  IN: 485.2 mL / OUT: 2900 mL / NET: -2414.8 mL    Head: Normocephalic and atraumatic.   Neck: No JVD. No bruits. Supple. Does not appear to be enlarged.   Cardiovascular: + S1,S2 ; RRR Soft systolic murmur at the left lower sternal border. No rubs noted.    Lungs: CTA b/l. No rhonchi, rales or wheezes.   Abdomen: + BS, soft. Non tender. Non distended. No rebound. No guarding.   Extremities: No clubbing/cyanosis/edema.   Neurologic: Moves all four extremities. Full range of motion.   Skin: Warm and moist. The patient's skin has normal elasticity and good skin turgor.       A/P) 71 y/o male PMH CAD s/p CABG, PAF/AFL on coumadin, HTN, hyperlipidemia, DM, ESRD on HD, hypothyroidism a/w sepsis from mitral valve endocarditis. Had a PEA arrest while in the hospital.     -recommend lifelong a/c for PAF.  Spontaneous conversion back to NSR in hospital.  -s/p PEA/jay/asystole arrest, secondary to critical illness and infection.  no pacemaker or ICD indicated at this time  -Endocarditis to be medically managed for now, refused by CTS for invasive management.  -no further inpatient EP workup expected but will follow  -supportive care as per DANIELA Nguyen M.D.  Cardiac Electrophysiology  [phone]

## 2021-07-26 NOTE — PROGRESS NOTE ADULT - SUBJECTIVE AND OBJECTIVE BOX
INTERVAL HPI/OVERNIGHT EVENTS:    SUBJECTIVE: Patient seen and examined at bedside.       VITAL SIGNS:  ICU Vital Signs Last 24 Hrs  T(C): 37.2 (26 Jul 2021 04:20), Max: 37.3 (25 Jul 2021 08:00)  T(F): 99 (26 Jul 2021 04:20), Max: 99.2 (25 Jul 2021 08:00)  HR: 79 (26 Jul 2021 03:00) (79 - 95)  BP: 108/39 (26 Jul 2021 06:00) (100/48 - 137/85)  BP(mean): 47 (26 Jul 2021 06:00) (47 - 97)  ABP: --  ABP(mean): --  RR: 25 (26 Jul 2021 03:00) (20 - 29)  SpO2: 100% (26 Jul 2021 03:00) (88% - 100%)      Plateau pressure:   P/F ratio:     07-24 @ 07:01  -  07-25 @ 07:00  --------------------------------------------------------  IN: 1439.4 mL / OUT: 2700 mL / NET: -1260.6 mL    07-25 @ 07:01  -  07-26 @ 06:44  --------------------------------------------------------  IN: 485.2 mL / OUT: 2900 mL / NET: -2414.8 mL      CAPILLARY BLOOD GLUCOSE      POCT Blood Glucose.: 151 mg/dL (26 Jul 2021 05:11)    ECG:    PHYSICAL EXAM:    General:   HEENT:   Neck:   Respiratory:   Cardiovascular:   Abdomen:   Extremities:  Neurological:    MEDICATIONS:  MEDICATIONS  (STANDING):  atorvastatin 40 milliGRAM(s) Oral at bedtime  cefepime   IVPB 1000 milliGRAM(s) IV Intermittent daily  chlorhexidine 2% Cloths 1 Application(s) Topical daily  dextrose 40% Gel 15 Gram(s) Oral once  dextrose 40% Gel 15 Gram(s) Oral once  dextrose 5%. 1000 milliLiter(s) (50 mL/Hr) IV Continuous <Continuous>  dextrose 5%. 1000 milliLiter(s) (100 mL/Hr) IV Continuous <Continuous>  dextrose 50% Injectable 25 Gram(s) IV Push once  dextrose 50% Injectable 12.5 Gram(s) IV Push once  dextrose 50% Injectable 25 Gram(s) IV Push once  diltiazem    Tablet 30 milliGRAM(s) Oral four times a day  epoetin danilo-epbx (RETACRIT) Injectable 61664 Unit(s) IV Push <User Schedule>  glucagon  Injectable 1 milliGRAM(s) IntraMuscular once  insulin lispro (ADMELOG) corrective regimen sliding scale   SubCutaneous every 6 hours  midodrine 20 milliGRAM(s) Oral every 8 hours  norepinephrine Infusion 0.05 MICROgram(s)/kG/Min (8.08 mL/Hr) IV Continuous <Continuous>  pantoprazole    Tablet 40 milliGRAM(s) Oral two times a day  vancomycin  IVPB 750 milliGRAM(s) IV Intermittent <User Schedule>    MEDICATIONS  (PRN):  acetaminophen   Tablet .. 1000 milliGRAM(s) Oral every 6 hours PRN Temp greater or equal to 38C (100.4F), Mild Pain (1 - 3), Moderate Pain (4 - 6)  calamine/zinc oxide Lotion 1 Application(s) Topical three times a day PRN Itching      ALLERGIES:  Allergies    lisinopril (Other)  opioid-like analgesics (Other)    Intolerances        LABS:                        8.0    15.07 )-----------( 266      ( 26 Jul 2021 04:25 )             27.2     07-26    134<L>  |  93<L>  |  36<H>  ----------------------------<  140<H>  3.7   |  25  |  5.06<H>    Ca    9.4      26 Jul 2021 04:25  Phos  4.7     07-26  Mg     2.10     07-26    TPro  6.4  /  Alb  2.5<L>  /  TBili  2.0<H>  /  DBili  x   /  AST  63<H>  /  ALT  27  /  AlkPhos  310<H>  07-26    PTT - ( 26 Jul 2021 04:25 )  PTT:30.2 sec      RADIOLOGY & ADDITIONAL TESTS: Reviewed.   INTERVAL HPI/OVERNIGHT EVENTS:    SUBJECTIVE: Patient seen and examined at bedside.     Patient intermittently regards examiners and grimaces to noxious stimuli. Attempts to produce speech, but can only groan. Overall mental status is lethargic, cannot assess for orientation. Moving all upper extremities equally. Occasional brief tremors in the hands while at rest. Showing signs of respiratory distress, such as accessory muscle use, while on NC this AM.      VITAL SIGNS:  ICU Vital Signs Last 24 Hrs  T(C): 37.2 (26 Jul 2021 04:20), Max: 37.3 (25 Jul 2021 08:00)  T(F): 99 (26 Jul 2021 04:20), Max: 99.2 (25 Jul 2021 08:00)  HR: 79 (26 Jul 2021 03:00) (79 - 95)  BP: 108/39 (26 Jul 2021 06:00) (100/48 - 137/85)  BP(mean): 47 (26 Jul 2021 06:00) (47 - 97)  ABP: --  ABP(mean): --  RR: 25 (26 Jul 2021 03:00) (20 - 29)  SpO2: 100% (26 Jul 2021 03:00) (88% - 100%)      Plateau pressure:   P/F ratio:     07-24 @ 07:01  -  07-25 @ 07:00  --------------------------------------------------------  IN: 1439.4 mL / OUT: 2700 mL / NET: -1260.6 mL    07-25 @ 07:01  -  07-26 @ 06:44  --------------------------------------------------------  IN: 485.2 mL / OUT: 2900 mL / NET: -2414.8 mL      CAPILLARY BLOOD GLUCOSE      POCT Blood Glucose.: 151 mg/dL (26 Jul 2021 05:11)    ECG:    PHYSICAL EXAM:    General: lethargic male  HEENT: pupils equal and reactive, gaze is midline and intermittently dysconjugate  Neck: supple  Respiratory: + accessory muscle use, no periods of apnea, on NC, lungs clear BL  Cardiovascular: RRR without murmurs or gallops  Abdomen: soft, non-tender, non-distended  Extremities: +AVF in LUE, WWP, non-edematous  Neurological: awake, lethargic, tracks examiners, grimaces to noxious stimuli, incomprehensible speech, moving all extremities equally, smooth EOM, not following commands    MEDICATIONS:  MEDICATIONS  (STANDING):  atorvastatin 40 milliGRAM(s) Oral at bedtime  cefepime   IVPB 1000 milliGRAM(s) IV Intermittent daily  chlorhexidine 2% Cloths 1 Application(s) Topical daily  dextrose 40% Gel 15 Gram(s) Oral once  dextrose 40% Gel 15 Gram(s) Oral once  dextrose 5%. 1000 milliLiter(s) (50 mL/Hr) IV Continuous <Continuous>  dextrose 5%. 1000 milliLiter(s) (100 mL/Hr) IV Continuous <Continuous>  dextrose 50% Injectable 25 Gram(s) IV Push once  dextrose 50% Injectable 12.5 Gram(s) IV Push once  dextrose 50% Injectable 25 Gram(s) IV Push once  diltiazem    Tablet 30 milliGRAM(s) Oral four times a day  epoetin danilo-epbx (RETACRIT) Injectable 24514 Unit(s) IV Push <User Schedule>  glucagon  Injectable 1 milliGRAM(s) IntraMuscular once  insulin lispro (ADMELOG) corrective regimen sliding scale   SubCutaneous every 6 hours  midodrine 20 milliGRAM(s) Oral every 8 hours  norepinephrine Infusion 0.05 MICROgram(s)/kG/Min (8.08 mL/Hr) IV Continuous <Continuous>  pantoprazole    Tablet 40 milliGRAM(s) Oral two times a day  vancomycin  IVPB 750 milliGRAM(s) IV Intermittent <User Schedule>    MEDICATIONS  (PRN):  acetaminophen   Tablet .. 1000 milliGRAM(s) Oral every 6 hours PRN Temp greater or equal to 38C (100.4F), Mild Pain (1 - 3), Moderate Pain (4 - 6)  calamine/zinc oxide Lotion 1 Application(s) Topical three times a day PRN Itching      ALLERGIES:  Allergies    lisinopril (Other)  opioid-like analgesics (Other)    Intolerances        LABS:                        8.0    15.07 )-----------( 266      ( 26 Jul 2021 04:25 )             27.2     07-26    134<L>  |  93<L>  |  36<H>  ----------------------------<  140<H>  3.7   |  25  |  5.06<H>    Ca    9.4      26 Jul 2021 04:25  Phos  4.7     07-26  Mg     2.10     07-26    TPro  6.4  /  Alb  2.5<L>  /  TBili  2.0<H>  /  DBili  x   /  AST  63<H>  /  ALT  27  /  AlkPhos  310<H>  07-26    PTT - ( 26 Jul 2021 04:25 )  PTT:30.2 sec      RADIOLOGY & ADDITIONAL TESTS: Reviewed.

## 2021-07-26 NOTE — PROGRESS NOTE ADULT - PROBLEM SELECTOR PLAN 1
continue HD as tolerated  family aware that at some time, pt will not be able to tolerated HD and understand goal would be comfort at that time

## 2021-07-26 NOTE — PROGRESS NOTE ADULT - PROBLEM SELECTOR PLAN 2
s/p extubation  doing well  continue o2 support  no further intubation as per family  can add dilaudid 0.5mg iv q 2 hours prn dyspnea

## 2021-07-26 NOTE — PROGRESS NOTE ADULT - ASSESSMENT
71 yo M with PMhx of CAD s/p CABG (course at that time complicated by pleural effusion requiring chest tubes), ESRD on HD (MWF), T2DM, HTN, Afib on coumadin, and anemia transferred from [facility] for evaluation of chest pain after HD, suspected GIB s/p 2U PRBC transfusion and Afib with RVR.   Admitted to MICU due to sepsis requiting pressor support. S/p cardiac arrest on 7/13 ROSC achieved after approx. 10 minutes.   Palliative care team consulted due to complex medical care and goals of care discussion, requested by patient's.

## 2021-07-27 LAB
ALBUMIN SERPL ELPH-MCNC: 2.3 G/DL — LOW (ref 3.3–5)
ALP SERPL-CCNC: 340 U/L — HIGH (ref 40–120)
ALT FLD-CCNC: 32 U/L — SIGNIFICANT CHANGE UP (ref 4–41)
ANION GAP SERPL CALC-SCNC: 19 MMOL/L — HIGH (ref 7–14)
APTT BLD: 38.8 SEC — HIGH (ref 27–36.3)
AST SERPL-CCNC: 67 U/L — HIGH (ref 4–40)
BASOPHILS # BLD AUTO: 0.07 K/UL — SIGNIFICANT CHANGE UP (ref 0–0.2)
BASOPHILS NFR BLD AUTO: 0.6 % — SIGNIFICANT CHANGE UP (ref 0–2)
BILIRUB SERPL-MCNC: 2 MG/DL — HIGH (ref 0.2–1.2)
BUN SERPL-MCNC: 45 MG/DL — HIGH (ref 7–23)
CALCIUM SERPL-MCNC: 9.1 MG/DL — SIGNIFICANT CHANGE UP (ref 8.4–10.5)
CHLORIDE SERPL-SCNC: 96 MMOL/L — LOW (ref 98–107)
CO2 SERPL-SCNC: 23 MMOL/L — SIGNIFICANT CHANGE UP (ref 22–31)
CREAT SERPL-MCNC: 6.08 MG/DL — HIGH (ref 0.5–1.3)
CULTURE RESULTS: SIGNIFICANT CHANGE UP
EOSINOPHIL # BLD AUTO: 0.11 K/UL — SIGNIFICANT CHANGE UP (ref 0–0.5)
EOSINOPHIL NFR BLD AUTO: 1 % — SIGNIFICANT CHANGE UP (ref 0–6)
GLUCOSE BLDC GLUCOMTR-MCNC: 104 MG/DL — HIGH (ref 70–99)
GLUCOSE BLDC GLUCOMTR-MCNC: 140 MG/DL — HIGH (ref 70–99)
GLUCOSE BLDC GLUCOMTR-MCNC: 206 MG/DL — HIGH (ref 70–99)
GLUCOSE SERPL-MCNC: 123 MG/DL — HIGH (ref 70–99)
HCT VFR BLD CALC: 29.6 % — LOW (ref 39–50)
HGB BLD-MCNC: 8.6 G/DL — LOW (ref 13–17)
IANC: 8.67 K/UL — HIGH (ref 1.5–8.5)
IMM GRANULOCYTES NFR BLD AUTO: 4.6 % — HIGH (ref 0–1.5)
LYMPHOCYTES # BLD AUTO: 0.84 K/UL — LOW (ref 1–3.3)
LYMPHOCYTES # BLD AUTO: 7.4 % — LOW (ref 13–44)
MAGNESIUM SERPL-MCNC: 2.2 MG/DL — SIGNIFICANT CHANGE UP (ref 1.6–2.6)
MCHC RBC-ENTMCNC: 26.5 PG — LOW (ref 27–34)
MCHC RBC-ENTMCNC: 29.1 GM/DL — LOW (ref 32–36)
MCV RBC AUTO: 91.4 FL — SIGNIFICANT CHANGE UP (ref 80–100)
MONOCYTES # BLD AUTO: 1.19 K/UL — HIGH (ref 0–0.9)
MONOCYTES NFR BLD AUTO: 10.4 % — SIGNIFICANT CHANGE UP (ref 2–14)
NEUTROPHILS # BLD AUTO: 8.67 K/UL — HIGH (ref 1.8–7.4)
NEUTROPHILS NFR BLD AUTO: 76 % — SIGNIFICANT CHANGE UP (ref 43–77)
NRBC # BLD: 2 /100 WBCS — SIGNIFICANT CHANGE UP
NRBC # FLD: 0.17 K/UL — HIGH
PHOSPHATE SERPL-MCNC: 4.8 MG/DL — HIGH (ref 2.5–4.5)
PLATELET # BLD AUTO: 266 K/UL — SIGNIFICANT CHANGE UP (ref 150–400)
POTASSIUM SERPL-MCNC: 4.2 MMOL/L — SIGNIFICANT CHANGE UP (ref 3.5–5.3)
POTASSIUM SERPL-SCNC: 4.2 MMOL/L — SIGNIFICANT CHANGE UP (ref 3.5–5.3)
PROT SERPL-MCNC: 6.2 G/DL — SIGNIFICANT CHANGE UP (ref 6–8.3)
RBC # BLD: 3.24 M/UL — LOW (ref 4.2–5.8)
RBC # FLD: 19.3 % — HIGH (ref 10.3–14.5)
SODIUM SERPL-SCNC: 138 MMOL/L — SIGNIFICANT CHANGE UP (ref 135–145)
SPECIMEN SOURCE: SIGNIFICANT CHANGE UP
VANCOMYCIN FLD-MCNC: 13.1 UG/ML — SIGNIFICANT CHANGE UP
WBC # BLD: 11.4 K/UL — HIGH (ref 3.8–10.5)
WBC # FLD AUTO: 11.4 K/UL — HIGH (ref 3.8–10.5)

## 2021-07-27 PROCEDURE — 99291 CRITICAL CARE FIRST HOUR: CPT

## 2021-07-27 PROCEDURE — 99233 SBSQ HOSP IP/OBS HIGH 50: CPT

## 2021-07-27 PROCEDURE — 70450 CT HEAD/BRAIN W/O DYE: CPT | Mod: 26

## 2021-07-27 RX ADMIN — Medication 2: at 23:58

## 2021-07-27 RX ADMIN — MIDODRINE HYDROCHLORIDE 20 MILLIGRAM(S): 2.5 TABLET ORAL at 14:12

## 2021-07-27 RX ADMIN — ERYTHROPOIETIN 20000 UNIT(S): 10000 INJECTION, SOLUTION INTRAVENOUS; SUBCUTANEOUS at 14:15

## 2021-07-27 RX ADMIN — Medication 30 MILLIGRAM(S): at 12:23

## 2021-07-27 RX ADMIN — Medication 250 MILLIGRAM(S): at 17:19

## 2021-07-27 RX ADMIN — CHLORHEXIDINE GLUCONATE 1 APPLICATION(S): 213 SOLUTION TOPICAL at 12:24

## 2021-07-27 RX ADMIN — Medication 30 MILLIGRAM(S): at 17:19

## 2021-07-27 RX ADMIN — CEFEPIME 100 MILLIGRAM(S): 1 INJECTION, POWDER, FOR SOLUTION INTRAMUSCULAR; INTRAVENOUS at 12:24

## 2021-07-27 RX ADMIN — ATORVASTATIN CALCIUM 40 MILLIGRAM(S): 80 TABLET, FILM COATED ORAL at 22:59

## 2021-07-27 RX ADMIN — MIDODRINE HYDROCHLORIDE 20 MILLIGRAM(S): 2.5 TABLET ORAL at 22:59

## 2021-07-27 RX ADMIN — Medication 30 MILLIGRAM(S): at 23:02

## 2021-07-27 NOTE — PROGRESS NOTE ADULT - ATTENDING COMMENTS
Patient examined and care reviewed in detail on bedside rounds  Critically ill on HD with limited airway clearance Family decision is for DNR/DNI but to continue HD  Frequent bedside visits with therapy change today.   I have personally provided 35+ minutes of critical care time concurrently with the resident/fellow; this excludes time spent on separate procedures.

## 2021-07-27 NOTE — PROGRESS NOTE ADULT - SUBJECTIVE AND OBJECTIVE BOX
Follow Up:      Inverval History/ROS:Patient is a 70y old  Male who presents with a chief complaint of Chest pain and GIB (27 Jul 2021 08:53)    Off pressors  No fever    Allergies    lisinopril (Other)  opioid-like analgesics (Other)    Intolerances        ANTIMICROBIALS:  cefepime   IVPB 1000 daily  vancomycin  IVPB 750 <User Schedule>      OTHER MEDS:  acetaminophen   Tablet .. 1000 milliGRAM(s) Oral every 6 hours PRN  atorvastatin 40 milliGRAM(s) Oral at bedtime  calamine/zinc oxide Lotion 1 Application(s) Topical three times a day PRN  chlorhexidine 2% Cloths 1 Application(s) Topical daily  dextrose 40% Gel 15 Gram(s) Oral once  dextrose 40% Gel 15 Gram(s) Oral once  dextrose 5%. 1000 milliLiter(s) IV Continuous <Continuous>  dextrose 5%. 1000 milliLiter(s) IV Continuous <Continuous>  dextrose 50% Injectable 25 Gram(s) IV Push once  dextrose 50% Injectable 12.5 Gram(s) IV Push once  dextrose 50% Injectable 25 Gram(s) IV Push once  diltiazem    Tablet 30 milliGRAM(s) Oral four times a day  epoetin danilo-epbx (RETACRIT) Injectable 85997 Unit(s) IV Push <User Schedule>  glucagon  Injectable 1 milliGRAM(s) IntraMuscular once  insulin lispro (ADMELOG) corrective regimen sliding scale   SubCutaneous every 6 hours  midodrine 20 milliGRAM(s) Oral every 8 hours  norepinephrine Infusion 0.05 MICROgram(s)/kG/Min IV Continuous <Continuous>  pantoprazole    Tablet 40 milliGRAM(s) Oral two times a day      Vital Signs Last 24 Hrs  T(C): 36.7 (27 Jul 2021 12:00), Max: 37.2 (27 Jul 2021 04:00)  T(F): 98 (27 Jul 2021 12:00), Max: 98.9 (27 Jul 2021 04:00)  HR: 86 (27 Jul 2021 12:00) (66 - 89)  BP: 128/48 (27 Jul 2021 12:00) (91/71 - 150/115)  BP(mean): 67 (27 Jul 2021 12:00) (51 - 124)  RR: 22 (27 Jul 2021 12:00) (19 - 31)  SpO2: 89% (27 Jul 2021 12:00) (89% - 100%)    PHYSICAL EXAM:  General: [ ] non-toxic  HEAD/EYES: [ ] PERRL x[ ] white sclera [ ] icterus  ENT:  [ ] normal x[ ] supple [ ] thrush [ ] pharyngeal exudate  Cardiovascular:   [ ] murmur [x ] normal [ ] PPM/AICD  Respiratory:  [x ] clear to ausculation bilaterally  GI:  [x ] soft, non-tender, normal bowel sounds  :  [ ] wagner [ ] no CVA tenderness   Musculoskeletal:  [ ] no synovitis  Neurologic:  [ ] non-focal exam   Skin:  [ x] no rash  Lymph: [x ] no lymphadenopathy  Psychiatric:  [ ] appropriate affect [ ] alert & oriented  Lines:  [ x] no phlebitis [ ] central line                                8.6    11.40 )-----------( 266      ( 27 Jul 2021 03:53 )             29.6       07-27    138  |  96<L>  |  45<H>  ----------------------------<  123<H>  4.2   |  23  |  6.08<H>    Ca    9.1      27 Jul 2021 03:53  Phos  4.8     07-27  Mg     2.20     07-27    TPro  6.2  /  Alb  2.3<L>  /  TBili  2.0<H>  /  DBili  x   /  AST  67<H>  /  ALT  32  /  AlkPhos  340<H>  07-27          MICROBIOLOGY:Culture Results:   No growth to date. (07-24-21 @ 18:47)  Culture Results:   Growth in aerobic and anaerobic bottles: Serratia marcescens  See previous culture [id number] (07-24-21 @ 14:28)  Culture Results:   Growth in aerobic bottle: Serratia marcescens  Growth in anaerobic bottle: Gram Negative Rods  ***Blood Panel PCR results on this specimen are available  approximately 3 hours after the Gram stain result.***  Gram stain, PCR, and/or culture resultsmay not always  correspond due to difference in methodologies.  ************************************************************  This PCR assay was performed by multiplex PCR. This  Assay tests for 66 bacterial and resistance gene targets.  Please referto the [facility] test directory  at [email or web address] for details. (07-22-21 @ 22:57)      RADIOLOGY:

## 2021-07-27 NOTE — PROGRESS NOTE ADULT - ASSESSMENT
70 year old with ESRD, DM, CAD presented with anemia and chest pain    1) MRSA bacteremia  associated endocarditis  Not a surgical candidate    Continue vancomycin 750 mg on HD days through 8/22/2021    2) Serratia bacteremia  ? focus- Gu vs intrabd vs line related    Serratia is a spice organism - cocern re inducable amp c resistance with beta lactams    Continue Cefepime- eventually can change to Cipro      No central line at this time  Check urine Cx  LFTS are elevated- alk phos/ t bili  RUQ sono suggestive CHF  Consider CT a/p to look for focus of serratia bacteremia    3) ESRD  abx  dose adjusted    Case and Plan d/w ICU

## 2021-07-27 NOTE — PROGRESS NOTE ADULT - SUBJECTIVE AND OBJECTIVE BOX
INTERVAL HPI/OVERNIGHT EVENTS:    SUBJECTIVE: Patient seen and examined at bedside.       VITAL SIGNS:  ICU Vital Signs Last 24 Hrs  T(C): 37.2 (27 Jul 2021 04:00), Max: 37.2 (27 Jul 2021 04:00)  T(F): 98.9 (27 Jul 2021 04:00), Max: 98.9 (27 Jul 2021 04:00)  HR: 87 (27 Jul 2021 06:00) (66 - 89)  BP: 140/55 (27 Jul 2021 06:00) (91/71 - 150/115)  BP(mean): 75 (27 Jul 2021 06:00) (51 - 124)  ABP: --  ABP(mean): --  RR: 19 (27 Jul 2021 06:00) (19 - 31)  SpO2: 100% (27 Jul 2021 05:00) (91% - 100%)      Plateau pressure:   P/F ratio:     CAPILLARY BLOOD GLUCOSE      POCT Blood Glucose.: 144 mg/dL (26 Jul 2021 22:57)    ECG:    PHYSICAL EXAM:    General:   HEENT:   Neck:   Respiratory:   Cardiovascular:   Abdomen:   Extremities:  Neurological:    MEDICATIONS:  MEDICATIONS  (STANDING):  atorvastatin 40 milliGRAM(s) Oral at bedtime  cefepime   IVPB 1000 milliGRAM(s) IV Intermittent daily  chlorhexidine 2% Cloths 1 Application(s) Topical daily  dextrose 40% Gel 15 Gram(s) Oral once  dextrose 40% Gel 15 Gram(s) Oral once  dextrose 5%. 1000 milliLiter(s) (50 mL/Hr) IV Continuous <Continuous>  dextrose 5%. 1000 milliLiter(s) (100 mL/Hr) IV Continuous <Continuous>  dextrose 50% Injectable 25 Gram(s) IV Push once  dextrose 50% Injectable 12.5 Gram(s) IV Push once  dextrose 50% Injectable 25 Gram(s) IV Push once  diltiazem    Tablet 30 milliGRAM(s) Oral four times a day  epoetin danilo-epbx (RETACRIT) Injectable 50926 Unit(s) IV Push <User Schedule>  glucagon  Injectable 1 milliGRAM(s) IntraMuscular once  insulin lispro (ADMELOG) corrective regimen sliding scale   SubCutaneous every 6 hours  midodrine 20 milliGRAM(s) Oral every 8 hours  norepinephrine Infusion 0.05 MICROgram(s)/kG/Min (8.08 mL/Hr) IV Continuous <Continuous>  pantoprazole    Tablet 40 milliGRAM(s) Oral two times a day  vancomycin  IVPB 750 milliGRAM(s) IV Intermittent <User Schedule>    MEDICATIONS  (PRN):  acetaminophen   Tablet .. 1000 milliGRAM(s) Oral every 6 hours PRN Temp greater or equal to 38C (100.4F), Mild Pain (1 - 3), Moderate Pain (4 - 6)  calamine/zinc oxide Lotion 1 Application(s) Topical three times a day PRN Itching      ALLERGIES:  Allergies    lisinopril (Other)  opioid-like analgesics (Other)    Intolerances        LABS:                        8.6    11.40 )-----------( 266      ( 27 Jul 2021 03:53 )             29.6     07-27    138  |  96<L>  |  45<H>  ----------------------------<  123<H>  4.2   |  23  |  6.08<H>    Ca    9.1      27 Jul 2021 03:53  Phos  4.8     07-27  Mg     2.20     07-27    TPro  6.2  /  Alb  2.3<L>  /  TBili  2.0<H>  /  DBili  x   /  AST  67<H>  /  ALT  32  /  AlkPhos  340<H>  07-27    PTT - ( 27 Jul 2021 03:53 )  PTT:38.8 sec      RADIOLOGY & ADDITIONAL TESTS: Reviewed.   INTERVAL HPI/OVERNIGHT EVENTS:  CT completed this AM, no acute infarcts or intracranial hemorrhage. Evolving R frontal infarct    SUBJECTIVE: Patient seen and examined at bedside. Patient continues to be lethargic. Occasionally regards the examiner. Withdraws to painful stimuli. Non-verbal. Vigorous non-productive coughing this AM.        VITAL SIGNS:  ICU Vital Signs Last 24 Hrs  T(C): 37.2 (27 Jul 2021 04:00), Max: 37.2 (27 Jul 2021 04:00)  T(F): 98.9 (27 Jul 2021 04:00), Max: 98.9 (27 Jul 2021 04:00)  HR: 87 (27 Jul 2021 06:00) (66 - 89)  BP: 140/55 (27 Jul 2021 06:00) (91/71 - 150/115)  BP(mean): 75 (27 Jul 2021 06:00) (51 - 124)  ABP: --  ABP(mean): --  RR: 19 (27 Jul 2021 06:00) (19 - 31)  SpO2: 100% (27 Jul 2021 05:00) (91% - 100%)      Plateau pressure:   P/F ratio:     CAPILLARY BLOOD GLUCOSE      POCT Blood Glucose.: 144 mg/dL (26 Jul 2021 22:57)    ECG:    PHYSICAL EXAM:    General: NAD, resting in bed  HEENT: nares wnl, pupils equal and reactive, trachea midline  Neck: supple  Respiratory: on NC, intermittently increased WOB  Cardiovascular: RRR without murmur or gallop  Abdomen: soft, non-tender, non-distended  Extremities: non-edematous, +LUE AVF  Neurological: lethargic, non-verbal, moving all extremities    MEDICATIONS:  MEDICATIONS  (STANDING):  atorvastatin 40 milliGRAM(s) Oral at bedtime  cefepime   IVPB 1000 milliGRAM(s) IV Intermittent daily  chlorhexidine 2% Cloths 1 Application(s) Topical daily  dextrose 40% Gel 15 Gram(s) Oral once  dextrose 40% Gel 15 Gram(s) Oral once  dextrose 5%. 1000 milliLiter(s) (50 mL/Hr) IV Continuous <Continuous>  dextrose 5%. 1000 milliLiter(s) (100 mL/Hr) IV Continuous <Continuous>  dextrose 50% Injectable 25 Gram(s) IV Push once  dextrose 50% Injectable 12.5 Gram(s) IV Push once  dextrose 50% Injectable 25 Gram(s) IV Push once  diltiazem    Tablet 30 milliGRAM(s) Oral four times a day  epoetin danilo-epbx (RETACRIT) Injectable 53029 Unit(s) IV Push <User Schedule>  glucagon  Injectable 1 milliGRAM(s) IntraMuscular once  insulin lispro (ADMELOG) corrective regimen sliding scale   SubCutaneous every 6 hours  midodrine 20 milliGRAM(s) Oral every 8 hours  norepinephrine Infusion 0.05 MICROgram(s)/kG/Min (8.08 mL/Hr) IV Continuous <Continuous>  pantoprazole    Tablet 40 milliGRAM(s) Oral two times a day  vancomycin  IVPB 750 milliGRAM(s) IV Intermittent <User Schedule>    MEDICATIONS  (PRN):  acetaminophen   Tablet .. 1000 milliGRAM(s) Oral every 6 hours PRN Temp greater or equal to 38C (100.4F), Mild Pain (1 - 3), Moderate Pain (4 - 6)  calamine/zinc oxide Lotion 1 Application(s) Topical three times a day PRN Itching      ALLERGIES:  Allergies    lisinopril (Other)  opioid-like analgesics (Other)    Intolerances        LABS:                        8.6    11.40 )-----------( 266      ( 27 Jul 2021 03:53 )             29.6     07-27    138  |  96<L>  |  45<H>  ----------------------------<  123<H>  4.2   |  23  |  6.08<H>    Ca    9.1      27 Jul 2021 03:53  Phos  4.8     07-27  Mg     2.20     07-27    TPro  6.2  /  Alb  2.3<L>  /  TBili  2.0<H>  /  DBili  x   /  AST  67<H>  /  ALT  32  /  AlkPhos  340<H>  07-27    PTT - ( 27 Jul 2021 03:53 )  PTT:38.8 sec      RADIOLOGY & ADDITIONAL TESTS: Reviewed.

## 2021-07-27 NOTE — PROGRESS NOTE ADULT - SUBJECTIVE AND OBJECTIVE BOX
Patient appears comfortable, awake but not answering questions    acetaminophen   Tablet .. 1000 milliGRAM(s) Oral every 6 hours PRN  atorvastatin 40 milliGRAM(s) Oral at bedtime  calamine/zinc oxide Lotion 1 Application(s) Topical three times a day PRN  cefepime   IVPB 1000 milliGRAM(s) IV Intermittent daily  chlorhexidine 2% Cloths 1 Application(s) Topical daily  dextrose 40% Gel 15 Gram(s) Oral once  dextrose 40% Gel 15 Gram(s) Oral once  dextrose 5%. 1000 milliLiter(s) IV Continuous <Continuous>  dextrose 5%. 1000 milliLiter(s) IV Continuous <Continuous>  dextrose 50% Injectable 25 Gram(s) IV Push once  dextrose 50% Injectable 12.5 Gram(s) IV Push once  dextrose 50% Injectable 25 Gram(s) IV Push once  diltiazem    Tablet 30 milliGRAM(s) Oral four times a day  epoetin danilo-epbx (RETACRIT) Injectable 04433 Unit(s) IV Push <User Schedule>  glucagon  Injectable 1 milliGRAM(s) IntraMuscular once  insulin lispro (ADMELOG) corrective regimen sliding scale   SubCutaneous every 6 hours  midodrine 20 milliGRAM(s) Oral every 8 hours  pantoprazole    Tablet 40 milliGRAM(s) Oral two times a day  vancomycin  IVPB 750 milliGRAM(s) IV Intermittent <User Schedule>                            8.6    11.40 )-----------( 266      ( 27 Jul 2021 03:53 )             29.6       Hemoglobin: 8.6 g/dL (07-27 @ 03:53)  Hemoglobin: 8.0 g/dL (07-26 @ 04:25)  Hemoglobin: 7.2 g/dL (07-25 @ 04:09)  Hemoglobin: 7.8 g/dL (07-24 @ 03:25)  Hemoglobin: 7.9 g/dL (07-23 @ 01:31)      07-27    138  |  96<L>  |  45<H>  ----------------------------<  123<H>  4.2   |  23  |  6.08<H>    Ca    9.1      27 Jul 2021 03:53  Phos  4.8     07-27  Mg     2.20     07-27    TPro  6.2  /  Alb  2.3<L>  /  TBili  2.0<H>  /  DBili  x   /  AST  67<H>  /  ALT  32  /  AlkPhos  340<H>  07-27    Creatinine Trend: 6.08<--, 5.06<--, 4.13<--, 5.33<--, 4.11<--, 5.90<--    COAGS: PTT - ( 27 Jul 2021 03:53 )  PTT:38.8 sec          T(C): 36.6 (07-27-21 @ 16:00), Max: 37.2 (07-27-21 @ 04:00)  HR: 88 (07-27-21 @ 17:00) (66 - 100)  BP: 118/44 (07-27-21 @ 17:00) (64/52 - 142/40)  RR: 25 (07-27-21 @ 17:00) (19 - 31)  SpO2: 99% (07-27-21 @ 17:00) (89% - 100%)  Wt(kg): --    I&O's Summary    27 Jul 2021 07:01  -  27 Jul 2021 17:41  --------------------------------------------------------  IN: 756 mL / OUT: 3400 mL / NET: -2644 mL          Gen: Appears well in NAD  HEENT:  (-)icterus (-)pallor  CV: N S1 S2 1/6 DEJAH (+)2 Pulses B/l  Resp:  Clear to ausculatation B/L, normal effort  GI: (+) BS Soft, NT, ND  Lymph:  (-)Edema, (-)obvious lymphadenopathy  Skin: Warm to touch, Normal turgor  Psych:  unable to adequately assess mood and affect      DATA  < from: Transthoracic Echocardiogram (07.07.21 @ 18:17) >  ------------------------------------------------------------------------  CONCLUSIONS:  1. Mitral annular calcification, otherwise normal mitral  valve. Minimal mitral regurgitation.  2. Endocardium not well visualized; grossly normal left  ventricular systolic function.  3. Right ventricular enlargement with decreased right  ventricular systolic function.  4. Inadequate tricuspid regurgitation Doppler envelope  precludes estimation of RVSP.  ------------------------------------------------------------------------  Confirmed on  7/7/2021 - 21:16:20 by Osvaldo Bertrand M.D.,  [facility], JOHN    < end of copied text >      A/P) 71 y/o male PMH CAD s/p CABG and old PCI, AFL x 1 year on coumadin, HTN, hyperlipidemia, DM, ESRD on HD, and hypothyroidism originally admitted to [facility] with GIB, transferred to [facility], found to have septic shock and MRSA bacteremia, trx to MICU s/p PEA arrest, intubated and now extubated    -Given MRSA bacteremia, TRUMAN performed demonstrating MV endocarditis - discussed with CTS Dr. Pierce - pt. not a surgical candidate at this time   -Abx per ID, blood cx now + for serratia so zosyn added  -CTH head noted - pt. with cva - ac was restarted but now held due to dropping h/h   -Right thigh hematoma noted on CT scan - appreciate vascular evaluation - no surgical intervention required by vascular at this time - monitor h/h  -Supportive care per MICU  -Follow up palliative care, pt is DNR  - HD per renal    Murphy Colin MD, [facility]  BEEPER [phone]

## 2021-07-27 NOTE — PROGRESS NOTE ADULT - ASSESSMENT
71 yo M with PMhx of CAD s/p CABG (course at that time complicated by pleural effusion requiring chest tubes), ESRD on HD (TTS), DMII, HTN, afib on coumadin, and anemia presented from [facility] for evaluation of chest pain and GIB. Renal following for ESRD Mx. s/p cardiac arrest. CTH showed acute infarcts. Further w/u, MRSA bacteremia, TRUMAN showed MR vegetation.     ESRD  Maintenance schedule TTS  K acceptable  Still edematous, but overall improved volume status.   Had isolated UF yesterday, BP stable during treatment.   Repeat HD today, net uf 3kg as tolerated by bp  Off levophed.   On diltiazem for afib rate control.   c/w vancomycin ivpb post hd, dosed by levels, for MRSA bacteremia and endocarditis.   dose all meds for ESRD  renal diet, fluid restriction    Anemia in CKD+GIB- Hb <goal  c/w Epo 20k tiw with HD  s/p prbc     Rudy Mendiola MD  [facility]  Office [phone]  Ans Serv [phone]  [facility] [phone]

## 2021-07-27 NOTE — PROGRESS NOTE ADULT - ATTENDING COMMENTS
he wakes u op but non communicative to us: for palliative care now: being treated for endocarditis: overall clinical situation is very guarded:

## 2021-07-27 NOTE — CHART NOTE - NSCHARTNOTEFT_GEN_A_CORE
NUTRITION FOLLOW-UP    _________________Diet___________________  Diet, Dysphagia 1 Pureed-Honey Consistency Fluid (07-27-21 @ 10:50)      71yo M w PMH CAD, ESRD on HD, DM, HTN, hypercholesterolemia, CVA, Aflutter on Warfarin.  Admitted for chest pain and possible GIB.  Chest tubes placed for pleural effusion.  Pt. became hypotensive, RRT (7/8) and transferred to MICU with hypercarbic respiratory failure requiring intubation & enteral feeding. Now s/p extubation... was NPO x 2d due to lethargy.  S/p swallow evaluation (7/25) with SLP recommendation for pureed foods w honey thickened liquids... PO diet just advanced.  Also with fluid overload, s/p HD.      Spoke with RN.  Pt. confused/disoriented at this time.  No noted/reported nausea/vomiting/diarrhea/constipation.  Last BM (7/25).    Weight variable, likely fluid related changes.      Weight:                             Height:  66"                   Ideal Body Weight: 75.5kg   84.0kg (7/26)  86.5kg (7/24)  87.7kg (7/16)  86.0kg (7/13)  85.5kg (7/11)  80.2kg (7/9)  75.5kg (7/7)      Edema:  1+ generalized, 2+ b/l legs, 3+ scrotum    Skin:  Intact       ________________________Pertinent Medications____________   MEDICATIONS  (STANDING):  atorvastatin 40 milliGRAM(s) Oral at bedtime  cefepime   IVPB 1000 milliGRAM(s) IV Intermittent daily  chlorhexidine 2% Cloths 1 Application(s) Topical daily  dextrose 40% Gel 15 Gram(s) Oral once  dextrose 40% Gel 15 Gram(s) Oral once  dextrose 5%. 1000 milliLiter(s) (50 mL/Hr) IV Continuous <Continuous>  dextrose 5%. 1000 milliLiter(s) (100 mL/Hr) IV Continuous <Continuous>  dextrose 50% Injectable 25 Gram(s) IV Push once  dextrose 50% Injectable 12.5 Gram(s) IV Push once  dextrose 50% Injectable 25 Gram(s) IV Push once  diltiazem    Tablet 30 milliGRAM(s) Oral four times a day  epoetin danilo-epbx (RETACRIT) Injectable 10832 Unit(s) IV Push <User Schedule>  glucagon  Injectable 1 milliGRAM(s) IntraMuscular once  insulin lispro (ADMELOG) corrective regimen sliding scale   SubCutaneous every 6 hours  midodrine 20 milliGRAM(s) Oral every 8 hours  norepinephrine Infusion 0.05 MICROgram(s)/kG/Min (8.08 mL/Hr) IV Continuous <Continuous>  pantoprazole    Tablet 40 milliGRAM(s) Oral two times a day  vancomycin  IVPB 750 milliGRAM(s) IV Intermittent <User Schedule>    MEDICATIONS  (PRN):  acetaminophen   Tablet .. 1000 milliGRAM(s) Oral every 6 hours PRN Temp greater or equal to 38C (100.4F), Mild Pain (1 - 3), Moderate Pain (4 - 6)  calamine/zinc oxide Lotion 1 Application(s) Topical three times a day PRN Itching          _________________________Pertinent Labs____________________     07-27    138  |  96<L>  |  45<H>  ----------------------------<  123<H>  4.2   |  23  |  6.08<H>    Ca    9.1      27 Jul 2021 03:53  Phos  4.8     07-27  Mg     2.20     07-27    TPro  6.2  /  Alb  2.3<L>  /  TBili  2.0<H>  /  DBili  x   /  AST  67<H>  /  ALT  32  /  AlkPhos  340<H>  07-27                                                                   8.6    11.40 )-----------( 266      ( 27 Jul 2021 03:53 )             29.6         CAPILLARY BLOOD GLUCOSE      POCT Blood Glucose.: 144 mg/dL (26 Jul 2021 22:57)    POCT Blood Glucose.: 144 mg/dL (07-26-21 @ 22:57)  POCT Blood Glucose.: 147 mg/dL (07-26-21 @ 18:16)  POCT Blood Glucose.: 169 mg/dL (07-26-21 @ 12:41)          PLAN/RECOMMENDATIONS:    1) Continue current diet, as tolerated.  Monitor percentage of meal intake.    2) Obtain pre/post weights  3) Aspiration precautions      RDN remains available and will f/u PRN.          Belkis Montano RDN, CDN pager [phone]

## 2021-07-27 NOTE — CHART NOTE - NSCHARTNOTEFT_GEN_A_CORE
MAR Accept Note  Transfer to:    Accepting Attending Physician:    Assigned Room:      Patient seen and examined.   Labs and data reviewed.   No findings precluding transfer of service.       HPI/MICU COURSE:   Please refer to MICU transfer note for full details. Briefly, this is a 71 yo M with PMhx of CAD s/p CABG (complicated by pleural effusion w chest tubes), ESRD on HD (MWF), DMII, HTN, afib on coumadin, and anemia presented from [facility] for evaluation of chest pain and GIB. Patient was admitted to [facility] after developing chest pain during HD. He was found to have Hgb of 6 and elevated trops concerning for GIB and NSTEM II. He was given 2 units of pRBCs and monitored off warfarin/aspirin. He underwent bowel prep but it did not show any bloody stool; no colonoscopy performed in [facility] as no active signs of bleeding. Patient was transferred to [facility] for further evaluation.    MICU was consulted 7/7 due to hypotension to 80/51. Patient was asymptomatic, denying f/c, n/v, dizziness, HA, CP, SOB at rest, abd pain. Midodrine was recommended. He received 20mg at 20:55 and 10mg at 00:00am. On 7/8, the patient continued to be hypotensive, morning dose of Midodrine was not given due to parameters. Chest tubes were placed for loculated pleural effusion and Midodrine was given. Patient became bradycardic and hypotensive, RRT was called due to multifactorial shock possibly 2/2 sepsis iso recent fluid removal from CT and possibly medication induced bradycardia. Pt persistently hypotensive and bradycardic during rapid. Given x 2 with no response. Given push of phenylephrine and started on levophed. Patient transferred to MICU    In MICU patient's BP was managed on levophed. Patient was doing well requiring less pressor support. BCx from 7/7 grew MRSA and so the patient was start on 7/11 on vanco by level (will continue with vanc through 8/22). His chest tube was removed on 7/12 as he wasn't making any output. O/N on 7/13, patient had cardiac arrest w/ vtach, received shocks, epi, coded 10 minutes prior to achieving ROSC and stabilized. Patient was also intubated at that time. Post-cardiac arrest CT head showed two new infarct in his brain. EKG was suggestive of conduction abnormality/heart block and cardiology decided to do a TRUMAN (TTE was inconclusive). Patient underwent TRUMAN with cardiology and was found to have 1.3 cm X 0.3 cm vegetation on posterior leaflet of the mitral valve with a PFO. Unclear etiology of stroke: septic emboli vs blood clot (Afib). CTS was contacted, however, given the patient's medical history and risk, a surgery was not recommended. Course was complicated by a drop in hgb <7 requiring 1u of blood twice o/n. FOBT was negative and GI did not think it was a GI bleed. Patient started complaining of thigh pain and subsequently a CT of right thigh was done on 7/21, which demonstrated a hematoma on his right adductor muscle with no extravasation. Surgery was consulted, they did think a surgery was indicated and recommended ACE-wrap. Patient hgb has now stabilized. He was tolerating well on CPAP and was extubated on 7/23. He was started on midodrine and his levophed was decreased and ultimately discontinued on 7/27. Additionally, 7/24 BCx were positive for  Serratia and hence patient was started on cefepime.    Patient is being followed by nephrology for HD, getting dialysis 3x/wk (MWF). His HD course was deviated given the course of complications in the MICU. Patient is positive 5L for hospital stay.     FOR FOLLOW-UP:  -electrophysiology following for PEA. Latest rec is to start lifelong AC. Continue to discuss with them whether patient is appropriate.  -vanc level this PM. Give dose of vanc once HD completed 7/27  -c/w IV cefepime 1g daily for serratia bacteremia  -c/w IV vancomycin MWF, dose per level (treatment planned through 8/22)  -c/w HD MWF as tolerated  -c/w dysphagia diet for comfort    José Miguel Zamora MD PGY-3  Internal Medicine  Pager [phone] / [phone]  MAR [id number] MAR Accept Note  Transfer to:  MEDICINE Team 8  Accepting Attending Physician: Dr. Philip   Assigned Room:  9S 96    Patient seen and examined.   Labs and data reviewed.   No findings precluding transfer of service.       HPI/MICU COURSE:   Please refer to MICU transfer note for full details. Briefly, this is a 71 yo M with PMhx of CAD s/p CABG (complicated by pleural effusion w chest tubes), ESRD on HD (MWF), DMII, HTN, afib on coumadin, and anemia presented from [facility] for evaluation of chest pain and GIB. Patient was admitted to [facility] after developing chest pain during HD. He was found to have Hgb of 6 and elevated trops concerning for GIB and NSTEM II. He was given 2 units of pRBCs and monitored off warfarin/aspirin. He underwent bowel prep but it did not show any bloody stool; no colonoscopy performed in [facility] as no active signs of bleeding. Patient was transferred to [facility] for further evaluation.    MICU was consulted 7/7 due to hypotension to 80/51. Patient was asymptomatic, denying f/c, n/v, dizziness, HA, CP, SOB at rest, abd pain. Midodrine was recommended. He received 20mg at 20:55 and 10mg at 00:00am. On 7/8, the patient continued to be hypotensive, morning dose of Midodrine was not given due to parameters. Chest tubes were placed for loculated pleural effusion and Midodrine was given. Patient became bradycardic and hypotensive, RRT was called due to multifactorial shock possibly 2/2 sepsis iso recent fluid removal from CT and possibly medication induced bradycardia. Pt persistently hypotensive and bradycardic during rapid. Given x 2 with no response. Given push of phenylephrine and started on levophed. Patient transferred to MICU    In MICU patient's BP was managed on levophed. Patient was doing well requiring less pressor support. BCx from 7/7 grew MRSA and so the patient was start on 7/11 on vanco by level (will continue with vanc through 8/22). His chest tube was removed on 7/12 as he wasn't making any output. O/N on 7/13, patient had cardiac arrest w/ vtach, received shocks, epi, coded 10 minutes prior to achieving ROSC and stabilized. Patient was also intubated at that time. Post-cardiac arrest CT head showed two new infarct in his brain. EKG was suggestive of conduction abnormality/heart block and cardiology decided to do a TRUMAN (TTE was inconclusive). Patient underwent TRUMAN with cardiology and was found to have 1.3 cm X 0.3 cm vegetation on posterior leaflet of the mitral valve with a PFO. Unclear etiology of stroke: septic emboli vs blood clot (Afib). CTS was contacted, however, given the patient's medical history and risk, a surgery was not recommended. Course was complicated by a drop in hgb <7 requiring 1u of blood twice o/n. FOBT was negative and GI did not think it was a GI bleed. Patient started complaining of thigh pain and subsequently a CT of right thigh was done on 7/21, which demonstrated a hematoma on his right adductor muscle with no extravasation. Surgery was consulted, they did think a surgery was indicated and recommended ACE-wrap. Patient hgb has now stabilized. He was tolerating well on CPAP and was extubated on 7/23. He was started on midodrine and his levophed was decreased and ultimately discontinued on 7/27. Additionally, 7/24 BCx were positive for  Serratia and hence patient was started on cefepime.    Patient is being followed by nephrology for HD, getting dialysis 3x/wk (MWF). His HD course was deviated given the course of complications in the MICU. Patient is positive 5L for hospital stay.     FOR FOLLOW-UP:  -electrophysiology following for PEA. Latest rec is to start lifelong AC. Continue to discuss with them whether patient is appropriate.  -vanc level this PM. Give dose of vanc once HD completed 7/27  -c/w IV cefepime 1g daily for serratia bacteremia  -c/w IV vancomycin MWF, dose per level (treatment planned through 8/22)  -c/w HD MWF as tolerated  -c/w dysphagia diet for comfort    José Miguel Zamora MD PGY-3  Internal Medicine  Pager [phone] / [phone]  MAR [id number]

## 2021-07-27 NOTE — PROGRESS NOTE ADULT - SUBJECTIVE AND OBJECTIVE BOX
Date of Service: 07-27-21 @ 13:49    Patient is a 70y old  Male who presents with a chief complaint of Chest pain and GIB (27 Jul 2021 13:33)    Any change in ROS:   Family at bedside  Lethargic but arousable  Appears comfortable on 2LNC     MEDICATIONS  (STANDING):  atorvastatin 40 milliGRAM(s) Oral at bedtime  cefepime   IVPB 1000 milliGRAM(s) IV Intermittent daily  chlorhexidine 2% Cloths 1 Application(s) Topical daily  dextrose 40% Gel 15 Gram(s) Oral once  dextrose 40% Gel 15 Gram(s) Oral once  dextrose 5%. 1000 milliLiter(s) (50 mL/Hr) IV Continuous <Continuous>  dextrose 5%. 1000 milliLiter(s) (100 mL/Hr) IV Continuous <Continuous>  dextrose 50% Injectable 25 Gram(s) IV Push once  dextrose 50% Injectable 12.5 Gram(s) IV Push once  dextrose 50% Injectable 25 Gram(s) IV Push once  diltiazem    Tablet 30 milliGRAM(s) Oral four times a day  epoetin danilo-epbx (RETACRIT) Injectable 84337 Unit(s) IV Push <User Schedule>  glucagon  Injectable 1 milliGRAM(s) IntraMuscular once  insulin lispro (ADMELOG) corrective regimen sliding scale   SubCutaneous every 6 hours  midodrine 20 milliGRAM(s) Oral every 8 hours  norepinephrine Infusion 0.05 MICROgram(s)/kG/Min (8.08 mL/Hr) IV Continuous <Continuous>  pantoprazole    Tablet 40 milliGRAM(s) Oral two times a day  vancomycin  IVPB 750 milliGRAM(s) IV Intermittent <User Schedule>    MEDICATIONS  (PRN):  acetaminophen   Tablet .. 1000 milliGRAM(s) Oral every 6 hours PRN Temp greater or equal to 38C (100.4F), Mild Pain (1 - 3), Moderate Pain (4 - 6)  calamine/zinc oxide Lotion 1 Application(s) Topical three times a day PRN Itching    Vital Signs Last 24 Hrs  T(C): 36.7 (27 Jul 2021 12:00), Max: 37.2 (27 Jul 2021 04:00)  T(F): 98 (27 Jul 2021 12:00), Max: 98.9 (27 Jul 2021 04:00)  HR: 86 (27 Jul 2021 12:00) (66 - 89)  BP: 128/48 (27 Jul 2021 12:00) (91/71 - 150/115)  BP(mean): 67 (27 Jul 2021 12:00) (54 - 124)  RR: 22 (27 Jul 2021 12:00) (19 - 31)  SpO2: 89% (27 Jul 2021 12:00) (89% - 100%)    I&O's Summary    27 Jul 2021 07:01  -  27 Jul 2021 13:49  --------------------------------------------------------  IN: 100 mL / OUT: 0 mL / NET: 100 mL    Physical Exam:   GENERAL: NAD  HEENT: KATHY  ENMT: No tonsillar erythema, exudates, or enlargement  NECK: Supple, No JVD  CHEST/LUNG: Coarse BS b/l   CVS: Regular rate and rhythm; No murmurs, rubs, or gallops  GI: : Soft, Nontender, Nondistended  NERVOUS SYSTEM:  Lethargic but arousable   EXTREMITIES:  2+ Peripheral Pulses, mild generalized edema   LYMPH: No lymphadenopathy noted  SKIN: No rashes or lesions  ENDOCRINOLOGY: No Thyromegaly  PSYCH: Calm    Labs:                          8.6    11.40 )-----------( 266      ( 27 Jul 2021 03:53 )             29.6                         8.0    15.07 )-----------( 266      ( 26 Jul 2021 04:25 )             27.2                         7.2    13.20 )-----------( 221      ( 25 Jul 2021 04:09 )             24.3                         7.8    16.34 )-----------( 262      ( 24 Jul 2021 03:25 )             26.5     07-27    138  |  96<L>  |  45<H>  ----------------------------<  123<H>  4.2   |  23  |  6.08<H>  07-26    134<L>  |  93<L>  |  36<H>  ----------------------------<  140<H>  3.7   |  25  |  5.06<H>  07-25    136  |  97<L>  |  26<H>  ----------------------------<  170<H>  3.7   |  26  |  4.13<H>  07-24    134<L>  |  92<L>  |  40<H>  ----------------------------<  190<H>  4.9   |  25  |  5.33<H>    Ca    9.1      27 Jul 2021 03:53  Ca    9.4      26 Jul 2021 04:25  Phos  4.8     07-27  Phos  4.7     07-26  Mg     2.20     07-27  Mg     2.10     07-26    TPro  6.2  /  Alb  2.3<L>  /  TBili  2.0<H>  /  DBili  x   /  AST  67<H>  /  ALT  32  /  AlkPhos  340<H>  07-27  TPro  6.4  /  Alb  2.5<L>  /  TBili  2.0<H>  /  DBili  x   /  AST  63<H>  /  ALT  27  /  AlkPhos  310<H>  07-26  TPro  5.8<L>  /  Alb  2.2<L>  /  TBili  1.4<H>  /  DBili  x   /  AST  51<H>  /  ALT  19  /  AlkPhos  219<H>  07-25  TPro  5.8<L>  /  Alb  2.2<L>  /  TBili  1.3<H>  /  DBili  x   /  AST  78<H>  /  ALT  17  /  AlkPhos  211<H>  07-24    CAPILLARY BLOOD GLUCOSE  POCT Blood Glucose.: 140 mg/dL (27 Jul 2021 12:21)  POCT Blood Glucose.: 144 mg/dL (26 Jul 2021 22:57)  POCT Blood Glucose.: 147 mg/dL (26 Jul 2021 18:16)    LIVER FUNCTIONS - ( 27 Jul 2021 03:53 )  Alb: 2.3 g/dL / Pro: 6.2 g/dL / ALK PHOS: 340 U/L / ALT: 32 U/L / AST: 67 U/L / GGT: x           PTT - ( 27 Jul 2021 03:53 )  PTT:38.8 sec    RECENT CULTURES:  07-24 @ 18:47 .Blood Blood-Peripheral     No growth to date.    07-24 @ 14:28 .Blood Blood-Peripheral     Growth in aerobic bottle: Gram Negative Rods  Growth in anaerobic bottle: Gram Negative Rods    Growth in aerobic and anaerobic bottles: Serratia marcescens  See previous culture [id number]    07-22 @ 22:57 .Blood Blood   PCR    Growth in aerobic bottle: Gram Negative Rods  Growth in anaerobic bottle: Gram Negative Rods    Blood Culture PCR  Serratia marcescens  Blood Culture PCR     Growth in aerobic bottle: Serratia marcescens  Growth in anaerobic bottle: Gram Negative Rods  ***Blood Panel PCR results on this specimen are available  approximately 3 hours after the Gram stain result.***  Gram stain, PCR, and/or culture resultsmay not always  correspond due to difference in methodologies.  ************************************************************  This PCR assay was performed by multiplex PCR. This  Assay tests for 66 bacterial and resistance gene targets.  Please referto the [facility] test directory  at [email or web address] for details.        Studies    CT SCAN Chest c< from: CT Chest w/ IV Cont (07.13.21 @ 18:36) >  FINDINGS:  CHEST:  LUNGS, LARGE AIRWAYS, AND PLEURA: Minimal secretions within the distal trachea and right main stem bronchus. Endotracheal tube is in place. Small bilateral pleural effusions,,partially loculated, with adjacent passive atelectasis. Vascular congestion and groundglass opacities.  VESSELS: Left IJ approach central venous catheter with the tip in the left brachiocephalic vein. Air is seen within the left brachiocephalic, likely secondary to injections. Left axillary vein stent is patent. Calcifications of the aortic arch and coronary vessels. CABG.  HEART: Cardiomegaly. Left ventricular free wall fatty replacement, likely related to prior myocardial infarction. No pericardial effusion. Retained epicardial pacemaker leads.  MEDIASTINUM AND YOANA: No lymphadenopathy.  CHEST WALL AND LOWER NECK: Enlarged thyroid gland with multiple hypodense nodules bilaterally, the right containing calcifications, appear unchanged. Median sternotomy.  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  BILE DUCTS: Normal caliber.  GALLBLADDER: Within normal limits.  SPLEEN: Within normal limits.  PANCREAS: Within normal limits.  ADRENALS: Within normal limits.  KIDNEYS/URETERS: Atrophic bilateral kidneys. No hydronephrosis. Exophytic 1.3 cm cyst arising from the left kidney.    BLADDER: Decompressed.  REPRODUCTIVE ORGANS: Prostate within normal limits. Bilateral hydroceles.    BOWEL: No bowel obstruction. Appendix is normal. Enteric tube is present with the tip in stomach.  PERITONEUM: Mild to moderate ascites in the perihepatic and perisplenic regions, tracking along the paracolic gutters. Trace ascites in the pelvis.  VESSELS: Atherosclerotic changes.  RETROPERITONEUM/LYMPH NODES: No lymphadenopathy.  ABDOMINAL WALL: Diffuse subcutaneous edema.  BONES: Degenerative changes.    IMPRESSION:  *  Mild pulmonary edema with bilateral partially loculated pleural effusions with adjacent passive atelectasis.  *  Debris within the right mainstem with associated partial atelectasis of the right lower lobe.  *  Mild to moderate ascites.      < end of copied text >    c< from: CT Head No Cont (07.27.21 @ 09:27) >  FINDINGS:  Ongoing evolution of subacute infarct involving the anterior frontal lobe. Chronic right posterior frontal lobe and occipital lobe infarcts are again noted. Chronic left cerebellar infarct.    No acute intracranial hemorrhage.    White matter hypoattenuating foci are noted, compatible with age-indeterminate small vessel disease.    No hydrocephalus. No extra-axial fluid collections.    Bilateral cataract surgery. Orbits are otherwise unremarkable. The imaged portions of the paranasal sinuses are essentially clear. Bilateral mastoid effusions are present. The visualized soft tissues and osseous structures appear normal.    IMPRESSION:  -No sizable new infarct identified. No acute intracranial hemorrhage.  -Ongoing evolution of subacute right frontal lobe infarct.  -Multiple chronic infarcts as described.      < end of copied text >      < from: TRUMAN w/o TTE (w/3D Echo) (07.19.21 @ 09:02) >  OBSERVATIONS:  Mitral Valve: A large (about  1.3 cm X 0.3 cm),  mobile  echodensity is visualized attached to the atrial aspect of  the medial (P3) scallop of the posterior mitral valve  leaflet and is consistent with a vegetation.  In addition,  a portion of the anterior mitral leaflet is markedly  thickened and this may represent sessile vegetation.  Moderate mitral regurgitation.  Vena contracta width about  0.4 - 0.5 cm.  Aortic Root: Normal aortic root.  Mild non-mobile  atherosclerotic plaque in the aortic arch.  Severe,  non-mobile atherosclerotic plaque in the descending  thoracic aorta.  Aortic Valve: Calcified trileaflet aortic valve with normal  opening.  No vegetations seen in association with the  aortic valve. No aortic valve regurgitation seen.  Left Atrium: Normal left atrium.  No left atrial or left  atrial appendage thrombus.  Left Ventricle: Mild segmental left ventricular systolic  dysfunction.  Hypokinesis of the basal to mid inferior  wall.  Right Heart: Normal right atrium. Right ventricular  enlargement with decreased right ventricular systolic  function. Normal tricuspid valve.  No vegetations seen in  association with the tricuspid valve.  Moderate-severe  tricuspid regurgitation. Normal pulmonic valve.  No  vegetations seen in association with the pulmonic valve.  Mild pulmonic regurgitation.  Pericardium/PleuraNormal pericardium with no pericardial  effusion.  Hemodynamic: Agitated saline injection and color flow  Doppler demonstrate evidence of a patent foramen ovale.  ------------------------------------------------------------------------  CONCLUSIONS:  1. A large (about  1.3 cm X 0.3 cm),  mobile echodensity is  visualized attached to the atrial aspect of the medial (P3)  scallop of the posterior mitral valve leaflet and is  consistent with a vegetation.  In addition, a portion of  the anterior mitral leaflet is markedly thickened and this  may represent sessile vegetation. Moderate mitral  regurgitation.  Vena contracta width about  0.4 - 0.5 cm.  2. Calcified trileaflet aortic valve with normal opening.  No vegetations seen in association with the aortic valve.  No aortic valve regurgitation seen.  3. Normal aortic root.  Mild non-mobile atherosclerotic  plaque in the aortic arch.  Severe, non-mobile  atherosclerotic plaque in the descending thoracic aorta.  4. Normal left atrium.  No left atrial or left atrial  appendage thrombus.  5. Mild segmental left ventricular systolic dysfunction.  Hypokinesis of the basal to mid inferior wall.  6. Right ventricular enlargement with decreased right  ventricular systolic function.  7. Normal tricuspid valve.  No vegetations seen in  association with the tricuspid valve.  Moderate-severe  tricuspid regurgitation.  8. Agitated saline injection and color flow Doppler  demonstrate evidence of a patent foramen ovale.    < end of copied text >

## 2021-07-27 NOTE — PROGRESS NOTE ADULT - ASSESSMENT
71 yo M with PMhx of CAD s/p CABG (course at that time complicated by pleural effusion requiring chest tubes), ESRD on HD (MWF), DMII, HTN, afib on coumadin, and anemia presented from [facility] for evaluation of chest pain and GIB.       left sided pleural effusion: loculated and chronic:   CAD:  CABG:  ESRD:   DM:  HTN:  A fibrillation:   GI Bleed    7/7:    left sided pleural effusion: loculated and chronic: he has chr loculated pleural effusion on left side: He had chest tube placement in last June 2020 at another hospital: no chemistry is available but he had negative cytology at that time: This time the effusion seems slightly worse and has loculated effusion with pleural thickening: heis not SOB andhasbeen on roomair: I think , it at all, if this effusion needs to be dealt with : it is probably vats: will contact thoracic surgery : etiology not very clear as there is no previous chemistry: coult be exudative or transudatve: he is on HD and now it is a chr christiano effusion   CAD: cont per cards  CABG: cont current meds  ESRD: on HD   DM: monitor and control  HTN: controlled  A fibrillation: off ac:   GI Bleed: per gi :  DW pt and t eam1    7/8:    left sided pleural effusion:: fever: s/p left sided chest tube placement: cultures sent: however with pr criteria it seems transudative await serum LDH but pleural fluid LDH is low:  already started on broad spectrum antibiotics ID to see: await cultures:   CAD: cont per cards  CABG: cont current meds  ESRD: on HD   DM: monitor and control  HTN: controlled  A fibrillation: off ac:   GI Bleed: per gi :  DW pmd    7/9:    left sided pleural effusion:: fever: s/p left sided chest tube placement: cultures sent: however with pr criteria it seems borderline transudative and transudative by LDH criteria: the vulutres ahve been negative so far:  already started on broad spectrum antibiotics ID to see: await cultures: ? source of spesis  CAD: cont per cards  CABG: cont current meds  ESRD: on HD   DM: monitor and control  HTN: in shock: now on vasopressors:   A fibrillation: off ac:   GI Bleed: per gi :  DW pmd  micu team    7/11:      left sided pleural effusion:: fever: s/p left sided chest tube placement: cultures sent: however with pr criteria it seems borderline transudative and transudative by LDH criteria: the cultures have been negative so far:  already started on broad spectrum antibiotics : Has MSSA bactermia  CAD: cont per cards: still on vasopressors: wean as tolerated ? needs molly ro tule out IE : would defer to cards:   CABG: cont current meds  ESRD: on HD   DM: monitor and control  HTN: in shock: now on vasopressors:   A fibrillation: off ac:   GI Bleed: per gi :  DW pmd  micu team    7/12:    left sided pleural effusion:: fever: s/p left sided chest tube placement:- now removed: cultures sent: however with pr criteria it seems borderline transudative and transudative by LDH criteria: the cultures have been negative so far:  already started on broad spectrum antibiotics : Has MRSA bactermia: vanco restarted yesterday ? echo/molly  Ac hypercarbic resp fialure: needs to be watched closelyin MICU : needs bipap may need intubation" At this time he is alert and awake: and responds to simple questions:    CAD: cont per cards: still on vasopressors: wean as tolerated ? needs molly ro tule out IE : would defer to cards:   CABG: cont current meds  ESRD: on HD   DM: monitor and control  HTN: in shock: now on vasopressors:   A fibrillation: off ac:   GI Bleed: per gi :  DW pmd  cards and bedstaff    7/13:  left sided pleural effusion:: fever: s/p left sided chest tube placement:- now removed: cultures sent: however with pr criteria it seems borderline transudative and transudative by LDH criteria: the cultures have been negative so far:  already started on broad spectrum antibiotics : Has MRSA bacteremia vanco restarted yesterday ? echo/molly  Ac hypercarbic resp fialure: now intubated s/p acls protocol last night   CAD: cont per cards: still on vasopressors: wean as tolerated ? needs molly ro tule out IE : would defer to cards:   CABG: cont current meds  ESRD: on HD   DM: monitor and control  HTN: in shock: now on vasopressors:   A fibrillation: off ac:   GI Bleed: per gi :  DELLA  staff  pt is critically ill now:     7/14:  left sided pleural effusion:: fever: s/p left sided chest tube placement:- now removed: cultures sent: however with pr criteria it seems borderline transudative and transudative by LDH criteria: the cultures have been negative so far:  already started on broad spectrum antibiotics : Has MRSA bacteremia vanco restarted yesterday now for  echo/molly  Ac hypercarbic resp fialure: now intubated s/p acls protocol last night -s/p cardiac arrest: ROSC 10 mts:   CAD: cont per cards: still on vasopressors: wean as tolerated ? needs molly ro tule out IE : would defer to cards: Cotn antbitioc  CABG: cont current meds  ESRD: on HD   DM: monitor and control  HTN: in shock: now on vasopressors:   A fibrillation: off ac:   GI Bleed: per gi :  DW  staff  pt is critically ill now: dw cards    7/15:    left sided pleural effusion:: transudative pl effusion with no with: certainly not the source for MRSA in blood:   Ac hypercarbic resp fialure: now intubated s/p acls protocol last night -s/p cardiac arrest: ROSC 10 mts:   CAD: cont per cards: still on vasopressors: wean as tolerated ? needs molly ro tule out IE : would defer to cards: Cotn antbitioc  CABG: cont current meds  ESRD: on HD   DM: monitor and control  HTN: in shock: now on vasopressors:   A fibrillation: on ac:   GI Bleed: per gi :  DW  staff  pt is critically ill now:     7/16"  left sided pleural effusion:: transudative pl effusion with no with: certainly not the source for MRSA in blood: ?source for MRSA:   Ac hypercarbic resp fialure: now intubated s/p acls protocol last night -s/p cardiac arrest: ROSC 10 mts: he seems to respond off sedation:per MICU note:   CAD: cont per cards: still on vasopressors: wean as tolerated ? needs molly ro tule out IE : would defer to cards: Cotn antbitioc  CABG: cont current meds  ESRD: on HD   DM: monitor and control  HTN: in shock: now on vasopressors:   A fibrillation: on ac:   GI Bleed: per gi :  DW  staff: PMD  pt is critically ill now:      7/17:    left sided pleural effusion:: transudative pl effusion with no with: certainly not the source for MRSA in blood: ?source for MRSA: for molly on monday  Ac hypercarbic resp fialure: now intubated s/p acls protocol last night -s/p cardiac arrest: ROSC 10 mts:   CAD: cont per cards: still on vasopressors: wean as tolerated ? needs molly ro tule out IE : would defer to cards: Cont antibiotics:   CABG: cont current meds  ESRD: on HD   DM: monitor and control  HTN: in shock: now on vasopressors:   A fibrillation: on ac:   GI Bleed: per gi :  DW  staff: PMD  pt is critically ill now:    7/18:    left sided pleural effusion:: transudative pl effusion with no with: certainly not the source for MRSA in blood: ?source for MRSA: for molly on monday: he is off sedation   Ac hypercarbic resp fialure: now intubated s/p acls protocol last night -s/p cardiac arrest: ROSC 10 mts:   Acute cva: noted on ct scan head: neurology following: already on heparin   CAD: cont per cards: still on vasopressors: wean as tolerated ? needs molly ro tule out IE : would defer to cards: Cont antibiotics:   CABG: cont current meds  ESRD: on HD   DM: monitor and control  HTN: in shock: now on vasopressors:   A fibrillation: on ac:   GI Bleed: per gi :  pt is critically ill now:    7/19:      left sided pleural effusion:: transudative pl effusion with no with: certainly not the source for MRSA in blood: ?source for MRSA: for molly today: he is off sedation but still on pressors:   Ac hypercarbic resp fialure: now intubated s/p acls protocol last night -s/p cardiac arrest: ROSC 10 mts:   Acute cva: noted on ct scan head: neurology following: already on heparin   CAD: cont per cards: still on vasopressors: wean as tolerated ? needs molly ro tule out IE : would defer to cards: Cont antibiotics: for molly today  CABG: cont current meds  ESRD: on HD   DM: monitor and control  HTN: in shock: now on vasopressors:   A fibrillation: on ac:   GI Bleed: per gi :  pt is critically ill now:    7/20  Acute Hypercarbic Respiratory Failure  -S/p RRT, tx to MICU 7/8, intubated 7/13 s/p cardiac arrest w/ ROSC  -CPAP trials, ventilator management per MICU team  -Pulmonary toileting, suction PRN   L pleural effusion   -Loculated L pleural effusion on CT chest, s/p CT placement by thoracic 7/8  -Transudative effusion, Cx negative   -CT since removed   -Repeat CT chest with small b/l partially loculated effusions, adjacent atelectasis  Bacteremia  -BC 7/9 MRSA+  -ABX per ID  -Repeat BC NGTD   -F/u cardiology recs regarding MOLLY   CAD (s/p CABG) - per cards   ESRD - HD per renal   GI bleed - GI recs noted     7/21  Acute Hypercarbic Respiratory Failure  -S/p RRT, tx to MICU 7/8, intubated 7/13 s/p cardiac arrest w/ ROSC  -CPAP trials, ventilator management per MICU team  -Pulmonary toileting, suction PRN   L pleural effusion   -Loculated L pleural effusion on CT chest, s/p CT placement by thoracic 7/8  -Transudative effusion, Cx negative   -CT since removed   -Repeat CT chest with small b/l partially loculated effusions, adjacent atelectasis  Bacteremia  -BC 7/9 MRSA+  -ABX per ID  -Repeat BC NGTD   -MOLLY now with MV vegetation per cards  -Pending CT lumbar spine   -F/u CT surgery recs  CAD (s/p CABG) - per cards   ESRD - HD per renal   GI bleed - GI recs noted     7/22  Acute Hypercarbic Respiratory Failure  --S/p RRT, tx to MICU 7/8, intubated 7/13 s/p cardiac arrest w/ ROSC-10 mts  --CPAP trials, ventilator management per MICU team  --Pulmonary toileting, suction PRN   L pleural effusion   --Loculated L pleural effusion on CT chest, s/p CT placement by thoracic 7/8  --Transudative effusion, Cx negative   --CT since removed   --Repeat CT chest with small b/l partially loculated effusions, adjacent atelectasis  Bacteremia  --BC 7/9 MRSA+, repeat BC NGTD   --MOLLY now with MV vegetation, not a candidate for surgery per CTS  --CT lumbar spine with no evidence of discitis   --ABX per ID  DVT prophylaxis  --AC held due to drop in H/H  --Concern for R thigh hematoma, vascular recs noted   CAD (s/p CABG) - per cards   ESRD - HD per renal   GI bleed - resolved, GI recs noted   Care per MICU team     7/23  Acute Hypercarbic Respiratory Failure  --S/p RRT, tx to MICU 7/8, intubated 7/13 s/p cardiac arrest w/ ROSC-10 mts  --Tolerating CPAP trials, plan to extubate per RN   --Ventilator management per MICU team  --Pulmonary toileting, suction PRN   L pleural effusion   --Loculated L pleural effusion on CT chest, s/p CT placement by thoracic 7/8  --Transudative effusion, Cx negative   --CT since removed   --Repeat CT chest with small b/l partially loculated effusions, adjacent atelectasis  Bacteremia  --BC 7/9 MRSA+, repeat BC NGTD   --MOLLY with MV vegetation, not a candidate for surgery per CTS  --CT lumbar spine with no evidence of discitis   --ABX per ID  DVT prophylaxis  --AC held due to drop in H/H  --Concern for R thigh hematoma, vascular recs noted   CAD (s/p CABG) - per cards   ESRD - HD per renal   GI bleed - resolved, GI recs noted   Care per MICU team     7/24:    Acute Hypercarbic Respiratory Failure : S/p RRT, tx to MICU 7/8, intubated 7/13 s/p cardiac arrest w/ ROSC-10 mts extubated now: alert and awake:   L pleural effusion : Loculated L pleural effusion on CT chest, s/p CT placement by thoracic 7/8: Transudative effusion, Cx negative : CT since removed : Repeat CT chest with small b/l partially loculated effusions, adjacent atelectasis  Bacteremia: -serratia: now: ij new blood cultures cont antibiotics : MOLLY with MV vegetation, not a candidate for surgery per CTS  --CT lumbar spine with no evidence of discitis   --ABX per ID  DVT prophylaxis : has hematoma in adductionr muscles: AC held due to drop in H/H  CAD (s/p CABG) - per cards   ESRD - HD per renal   GI bleed - resolved, GI recs noted   Care per MICU team : della resident today     7/25:    Acute Hypercarbic Respiratory Failure : S/p RRT, tx to MICU 7/8, intubated 7/13 s/p cardiac arrest w/ ROSC-10 mts extubated now: todays he is lethargic abg being done:   L pleural effusion : Loculated L pleural effusion on CT chest, s/p CT placement by thoracic 7/8: Transudative effusion, Cx negative : CT since removed : Repeat CT chest with small b/l partially loculated effusions, adjacent atelectasis  Bacteremia: -serratia: now: ij new blood cultures cont antibiotics : MOLLY with MV vegetation, not a candidate for surgery per CTS: CT lumbar spine with no evidence of discitis : ABX per ID  DVT prophylaxis : has hematoma in adductor muscles: AC held due to drop in H/H  CAD (s/p CABG) - per cards   ESRD - HD per renal   GI bleed - resolved, GI recs noted   Care per MICU team : dw resident today too!    7/26:    Acute Hypercarbic Respiratory Failure : S/p RRT, tx to MICU 7/8, intubated 7/13 s/p cardiac arrest w/ ROSC-10 mts extubated now: todays he is lethargic abg from yesterday  noted: pretty reasonable   L pleural effusion : Loculated L pleural effusion on CT chest, s/p CT placement by thoracic 7/8: Transudative effusion, Cx negative : CT since removed : Repeat CT chest with small b/l partially loculated effusions, adjacent atelectasis  Bacteremia: -serratia: now: ij new blood cultures cont antibiotics : MOLLY with MV vegetation, not a candidate for surgery per CTS: CT lumbar spine with no evidence of discitis : ABX per ID  DVT prophylaxis : has hematoma in adductor muscles: AC held due to drop in H/H  CAD (s/p CABG) - per cards   ESRD - HD per renal   GI bleed - resolved, GI recs noted   Care per MICU team :     7/27  Acute Hypercarbic Respiratory Failure   -S/p RRT, tx to MICU 7/8, intubated 7/13 s/p cardiac arrest w/ ROSC (10 min)  -Extubated now, ABG noted  -O2 sats % on 2LNC  -Chest PT   L pleural effusion   -Loculated L pleural effusion on CT chest, s/p CT placement by thoracic 7/8 (Transudative effusion, Cx negative)  -CT since removed   -Repeat CT chest with small b/l partially loculated effusions, adjacent atelectasis  Bacteremia  -BC 7/9 MRSA+, now BC 7/24 +Serratia  -MOLLY with MV vegetation  -ABX per ID   -ID recs noted  DVT prophylaxis  -remains off AC per MICU team   CAD (s/p CABG) - per cards   ESRD - HD per renal   GI bleed - resolved, GI recs noted   Care per MICU team 69 yo M with PMhx of CAD s/p CABG (course at that time complicated by pleural effusion requiring chest tubes), ESRD on HD (MWF), DMII, HTN, afib on coumadin, and anemia presented from [facility] for evaluation of chest pain and GIB.       left sided pleural effusion: loculated and chronic:   CAD:  CABG:  ESRD:   DM:  HTN:  A fibrillation:   GI Bleed    7/7:    left sided pleural effusion: loculated and chronic: he has chr loculated pleural effusion on left side: He had chest tube placement in last June 2020 at another hospital: no chemistry is available but he had negative cytology at that time: This time the effusion seems slightly worse and has loculated effusion with pleural thickening: heis not SOB andhasbeen on roomair: I think , it at all, if this effusion needs to be dealt with : it is probably vats: will contact thoracic surgery : etiology not very clear as there is no previous chemistry: coult be exudative or transudatve: he is on HD and now it is a chr christiano effusion   CAD: cont per cards  CABG: cont current meds  ESRD: on HD   DM: monitor and control  HTN: controlled  A fibrillation: off ac:   GI Bleed: per gi :  DW pt and t eam1    7/8:    left sided pleural effusion:: fever: s/p left sided chest tube placement: cultures sent: however with pr criteria it seems transudative await serum LDH but pleural fluid LDH is low:  already started on broad spectrum antibiotics ID to see: await cultures:   CAD: cont per cards  CABG: cont current meds  ESRD: on HD   DM: monitor and control  HTN: controlled  A fibrillation: off ac:   GI Bleed: per gi :  DW pmd    7/9:    left sided pleural effusion:: fever: s/p left sided chest tube placement: cultures sent: however with pr criteria it seems borderline transudative and transudative by LDH criteria: the vulutres ahve been negative so far:  already started on broad spectrum antibiotics ID to see: await cultures: ? source of spesis  CAD: cont per cards  CABG: cont current meds  ESRD: on HD   DM: monitor and control  HTN: in shock: now on vasopressors:   A fibrillation: off ac:   GI Bleed: per gi :  DW pmd  micu team    7/11:      left sided pleural effusion:: fever: s/p left sided chest tube placement: cultures sent: however with pr criteria it seems borderline transudative and transudative by LDH criteria: the cultures have been negative so far:  already started on broad spectrum antibiotics : Has MSSA bactermia  CAD: cont per cards: still on vasopressors: wean as tolerated ? needs molly ro tule out IE : would defer to cards:   CABG: cont current meds  ESRD: on HD   DM: monitor and control  HTN: in shock: now on vasopressors:   A fibrillation: off ac:   GI Bleed: per gi :  DW pmd  micu team    7/12:    left sided pleural effusion:: fever: s/p left sided chest tube placement:- now removed: cultures sent: however with pr criteria it seems borderline transudative and transudative by LDH criteria: the cultures have been negative so far:  already started on broad spectrum antibiotics : Has MRSA bactermia: vanco restarted yesterday ? echo/molly  Ac hypercarbic resp fialure: needs to be watched closelyin MICU : needs bipap may need intubation" At this time he is alert and awake: and responds to simple questions:    CAD: cont per cards: still on vasopressors: wean as tolerated ? needs molly ro tule out IE : would defer to cards:   CABG: cont current meds  ESRD: on HD   DM: monitor and control  HTN: in shock: now on vasopressors:   A fibrillation: off ac:   GI Bleed: per gi :  DW pmd  cards and bedstaff    7/13:  left sided pleural effusion:: fever: s/p left sided chest tube placement:- now removed: cultures sent: however with pr criteria it seems borderline transudative and transudative by LDH criteria: the cultures have been negative so far:  already started on broad spectrum antibiotics : Has MRSA bacteremia vanco restarted yesterday ? echo/molly  Ac hypercarbic resp fialure: now intubated s/p acls protocol last night   CAD: cont per cards: still on vasopressors: wean as tolerated ? needs molly ro tule out IE : would defer to cards:   CABG: cont current meds  ESRD: on HD   DM: monitor and control  HTN: in shock: now on vasopressors:   A fibrillation: off ac:   GI Bleed: per gi :  DELLA  staff  pt is critically ill now:     7/14:  left sided pleural effusion:: fever: s/p left sided chest tube placement:- now removed: cultures sent: however with pr criteria it seems borderline transudative and transudative by LDH criteria: the cultures have been negative so far:  already started on broad spectrum antibiotics : Has MRSA bacteremia vanco restarted yesterday now for  echo/molly  Ac hypercarbic resp fialure: now intubated s/p acls protocol last night -s/p cardiac arrest: ROSC 10 mts:   CAD: cont per cards: still on vasopressors: wean as tolerated ? needs molly ro tule out IE : would defer to cards: Cotn antbitioc  CABG: cont current meds  ESRD: on HD   DM: monitor and control  HTN: in shock: now on vasopressors:   A fibrillation: off ac:   GI Bleed: per gi :  DW  staff  pt is critically ill now: dw cards    7/15:    left sided pleural effusion:: transudative pl effusion with no with: certainly not the source for MRSA in blood:   Ac hypercarbic resp fialure: now intubated s/p acls protocol last night -s/p cardiac arrest: ROSC 10 mts:   CAD: cont per cards: still on vasopressors: wean as tolerated ? needs molyl ro tule out IE : would defer to cards: Cotn antbitioc  CABG: cont current meds  ESRD: on HD   DM: monitor and control  HTN: in shock: now on vasopressors:   A fibrillation: on ac:   GI Bleed: per gi :  DW  staff  pt is critically ill now:     7/16"  left sided pleural effusion:: transudative pl effusion with no with: certainly not the source for MRSA in blood: ?source for MRSA:   Ac hypercarbic resp fialure: now intubated s/p acls protocol last night -s/p cardiac arrest: ROSC 10 mts: he seems to respond off sedation:per MICU note:   CAD: cont per cards: still on vasopressors: wean as tolerated ? needs molly ro tule out IE : would defer to cards: Cotn antbitioc  CABG: cont current meds  ESRD: on HD   DM: monitor and control  HTN: in shock: now on vasopressors:   A fibrillation: on ac:   GI Bleed: per gi :  DW  staff: PMD  pt is critically ill now:      7/17:    left sided pleural effusion:: transudative pl effusion with no with: certainly not the source for MRSA in blood: ?source for MRSA: for molly on monday  Ac hypercarbic resp fialure: now intubated s/p acls protocol last night -s/p cardiac arrest: ROSC 10 mts:   CAD: cont per cards: still on vasopressors: wean as tolerated ? needs molly ro tule out IE : would defer to cards: Cont antibiotics:   CABG: cont current meds  ESRD: on HD   DM: monitor and control  HTN: in shock: now on vasopressors:   A fibrillation: on ac:   GI Bleed: per gi :  DW  staff: PMD  pt is critically ill now:    7/18:    left sided pleural effusion:: transudative pl effusion with no with: certainly not the source for MRSA in blood: ?source for MRSA: for molly on monday: he is off sedation   Ac hypercarbic resp fialure: now intubated s/p acls protocol last night -s/p cardiac arrest: ROSC 10 mts:   Acute cva: noted on ct scan head: neurology following: already on heparin   CAD: cont per cards: still on vasopressors: wean as tolerated ? needs molly ro tule out IE : would defer to cards: Cont antibiotics:   CABG: cont current meds  ESRD: on HD   DM: monitor and control  HTN: in shock: now on vasopressors:   A fibrillation: on ac:   GI Bleed: per gi :  pt is critically ill now:    7/19:      left sided pleural effusion:: transudative pl effusion with no with: certainly not the source for MRSA in blood: ?source for MRSA: for molly today: he is off sedation but still on pressors:   Ac hypercarbic resp fialure: now intubated s/p acls protocol last night -s/p cardiac arrest: ROSC 10 mts:   Acute cva: noted on ct scan head: neurology following: already on heparin   CAD: cont per cards: still on vasopressors: wean as tolerated ? needs molly ro tule out IE : would defer to cards: Cont antibiotics: for molly today  CABG: cont current meds  ESRD: on HD   DM: monitor and control  HTN: in shock: now on vasopressors:   A fibrillation: on ac:   GI Bleed: per gi :  pt is critically ill now:    7/20  Acute Hypercarbic Respiratory Failure  -S/p RRT, tx to MICU 7/8, intubated 7/13 s/p cardiac arrest w/ ROSC  -CPAP trials, ventilator management per MICU team  -Pulmonary toileting, suction PRN   L pleural effusion   -Loculated L pleural effusion on CT chest, s/p CT placement by thoracic 7/8  -Transudative effusion, Cx negative   -CT since removed   -Repeat CT chest with small b/l partially loculated effusions, adjacent atelectasis  Bacteremia  -BC 7/9 MRSA+  -ABX per ID  -Repeat BC NGTD   -F/u cardiology recs regarding MOLLY   CAD (s/p CABG) - per cards   ESRD - HD per renal   GI bleed - GI recs noted     7/21  Acute Hypercarbic Respiratory Failure  -S/p RRT, tx to MICU 7/8, intubated 7/13 s/p cardiac arrest w/ ROSC  -CPAP trials, ventilator management per MICU team  -Pulmonary toileting, suction PRN   L pleural effusion   -Loculated L pleural effusion on CT chest, s/p CT placement by thoracic 7/8  -Transudative effusion, Cx negative   -CT since removed   -Repeat CT chest with small b/l partially loculated effusions, adjacent atelectasis  Bacteremia  -BC 7/9 MRSA+  -ABX per ID  -Repeat BC NGTD   -MOLLY now with MV vegetation per cards  -Pending CT lumbar spine   -F/u CT surgery recs  CAD (s/p CABG) - per cards   ESRD - HD per renal   GI bleed - GI recs noted     7/22  Acute Hypercarbic Respiratory Failure  --S/p RRT, tx to MICU 7/8, intubated 7/13 s/p cardiac arrest w/ ROSC-10 mts  --CPAP trials, ventilator management per MICU team  --Pulmonary toileting, suction PRN   L pleural effusion   --Loculated L pleural effusion on CT chest, s/p CT placement by thoracic 7/8  --Transudative effusion, Cx negative   --CT since removed   --Repeat CT chest with small b/l partially loculated effusions, adjacent atelectasis  Bacteremia  --BC 7/9 MRSA+, repeat BC NGTD   --MOLLY now with MV vegetation, not a candidate for surgery per CTS  --CT lumbar spine with no evidence of discitis   --ABX per ID  DVT prophylaxis  --AC held due to drop in H/H  --Concern for R thigh hematoma, vascular recs noted   CAD (s/p CABG) - per cards   ESRD - HD per renal   GI bleed - resolved, GI recs noted   Care per MICU team     7/23  Acute Hypercarbic Respiratory Failure  --S/p RRT, tx to MICU 7/8, intubated 7/13 s/p cardiac arrest w/ ROSC-10 mts  --Tolerating CPAP trials, plan to extubate per RN   --Ventilator management per MICU team  --Pulmonary toileting, suction PRN   L pleural effusion   --Loculated L pleural effusion on CT chest, s/p CT placement by thoracic 7/8  --Transudative effusion, Cx negative   --CT since removed   --Repeat CT chest with small b/l partially loculated effusions, adjacent atelectasis  Bacteremia  --BC 7/9 MRSA+, repeat BC NGTD   --MOLLY with MV vegetation, not a candidate for surgery per CTS  --CT lumbar spine with no evidence of discitis   --ABX per ID  DVT prophylaxis  --AC held due to drop in H/H  --Concern for R thigh hematoma, vascular recs noted   CAD (s/p CABG) - per cards   ESRD - HD per renal   GI bleed - resolved, GI recs noted   Care per MICU team     7/24:    Acute Hypercarbic Respiratory Failure : S/p RRT, tx to MICU 7/8, intubated 7/13 s/p cardiac arrest w/ ROSC-10 mts extubated now: alert and awake:   L pleural effusion : Loculated L pleural effusion on CT chest, s/p CT placement by thoracic 7/8: Transudative effusion, Cx negative : CT since removed : Repeat CT chest with small b/l partially loculated effusions, adjacent atelectasis  Bacteremia: -serratia: now: ij new blood cultures cont antibiotics : MOLLY with MV vegetation, not a candidate for surgery per CTS  --CT lumbar spine with no evidence of discitis   --ABX per ID  DVT prophylaxis : has hematoma in adductionr muscles: AC held due to drop in H/H  CAD (s/p CABG) - per cards   ESRD - HD per renal   GI bleed - resolved, GI recs noted   Care per MICU team : [facility] resident today     7/25:    Acute Hypercarbic Respiratory Failure : S/p RRT, tx to MICU 7/8, intubated 7/13 s/p cardiac arrest w/ ROSC-10 mts extubated now: todays he is lethargic abg being done:   L pleural effusion : Loculated L pleural effusion on CT chest, s/p CT placement by thoracic 7/8: Transudative effusion, Cx negative : CT since removed : Repeat CT chest with small b/l partially loculated effusions, adjacent atelectasis  Bacteremia: -serratia: now: ij new blood cultures cont antibiotics : MOLLY with MV vegetation, not a candidate for surgery per CTS: CT lumbar spine with no evidence of discitis : ABX per ID  DVT prophylaxis : has hematoma in adductor muscles: AC held due to drop in H/H  CAD (s/p CABG) - per cards   ESRD - HD per renal   GI bleed - resolved, GI recs noted   Care per MICU team : [facility] resident today too!    7/26:    Acute Hypercarbic Respiratory Failure : S/p RRT, tx to MICU 7/8, intubated 7/13 s/p cardiac arrest w/ ROSC-10 mts extubated now: todays he is lethargic abg from yesterday  noted: pretty reasonable   L pleural effusion : Loculated L pleural effusion on CT chest, s/p CT placement by thoracic 7/8: Transudative effusion, Cx negative : CT since removed : Repeat CT chest with small b/l partially loculated effusions, adjacent atelectasis  Bacteremia: -serratia: now: ij new blood cultures cont antibiotics : MOLLY with MV vegetation, not a candidate for surgery per CTS: CT lumbar spine with no evidence of discitis : ABX per ID  DVT prophylaxis : has hematoma in adductor muscles: AC held due to drop in H/H  CAD (s/p CABG) - per cards   ESRD - HD per renal   GI bleed - resolved, GI recs noted   Care per MICU team :     7/27  Acute Hypercarbic Respiratory Failure   -S/p RRT, tx to MICU 7/8, intubated 7/13 s/p cardiac arrest w/ ROSC (10 min)  -Extubated now, ABG noted  -O2 sats % on 2LNC  -Chest PT   L pleural effusion   -Loculated L pleural effusion on CT chest, s/p CT placement by thoracic 7/8 (Transudative effusion, Cx negative)  -CT since removed   -Repeat CT chest with small b/l partially loculated effusions, adjacent atelectasis  Bacteremia  -BC 7/9 MRSA+, now BC 7/24 +Serratia  -MOLLY with MV vegetation  -ABX per ID   -ID recs noted  DVT prophylaxis  -remains off AC per MICU team   CAD (s/p CABG) - per cards   ESRD - HD per renal   GI bleed - resolved, GI recs noted   Palliative care recs noted, overall prognosis guarded

## 2021-07-27 NOTE — PROGRESS NOTE ADULT - SUBJECTIVE AND OBJECTIVE BOX
Nephrology Followup Note - [phone] - Dr Mendiola / Dr Knight / Dr Macias / Dr Arroyo / Dr Mackey / Dr Bowen / Dr Esposito / Dr Calle  Pt seen and examined at bedside earlier today.   Pt extubated, confused, not following commands.     Allergies:  lisinopril (Other)  opioid-like analgesics (Other)    Hospital Medications:   MEDICATIONS  (STANDING):  atorvastatin 40 milliGRAM(s) Oral at bedtime  cefepime   IVPB 1000 milliGRAM(s) IV Intermittent daily  chlorhexidine 2% Cloths 1 Application(s) Topical daily  dextrose 40% Gel 15 Gram(s) Oral once  dextrose 40% Gel 15 Gram(s) Oral once  dextrose 5%. 1000 milliLiter(s) (50 mL/Hr) IV Continuous <Continuous>  dextrose 5%. 1000 milliLiter(s) (100 mL/Hr) IV Continuous <Continuous>  dextrose 50% Injectable 25 Gram(s) IV Push once  dextrose 50% Injectable 12.5 Gram(s) IV Push once  dextrose 50% Injectable 25 Gram(s) IV Push once  diltiazem    Tablet 30 milliGRAM(s) Oral four times a day  epoetin danilo-epbx (RETACRIT) Injectable 65167 Unit(s) IV Push <User Schedule>  glucagon  Injectable 1 milliGRAM(s) IntraMuscular once  insulin lispro (ADMELOG) corrective regimen sliding scale   SubCutaneous every 6 hours  midodrine 20 milliGRAM(s) Oral every 8 hours  norepinephrine Infusion 0.05 MICROgram(s)/kG/Min (8.08 mL/Hr) IV Continuous <Continuous>  pantoprazole    Tablet 40 milliGRAM(s) Oral two times a day  vancomycin  IVPB 750 milliGRAM(s) IV Intermittent <User Schedule>    VITALS:  T(F): 98 (07-27-21 @ 12:00), Max: 98.9 (07-27-21 @ 04:00)  HR: 100 (07-27-21 @ 15:00)  BP: 140/101 (07-27-21 @ 15:00)  RR: 31 (07-27-21 @ 15:00)  SpO2: 98% (07-27-21 @ 15:00)  Wt(kg): --    07-27 @ 07:01  -  07-27 @ 15:45  --------------------------------------------------------  IN: 106 mL / OUT: 0 mL / NET: 106 mL        PHYSICAL EXAM:  Constitutional: NAD  HEENT: anicteric sclera, oropharynx clear, MMM  Neck: No JVD  Respiratory: CTAB, no wheezes, rales or rhonchi  Cardiovascular: S1, S2, RRR  Gastrointestinal: BS+, soft, NT/ND  Extremities: No cyanosis or clubbing. +peripheral edema  Neurological: confused  : No CVA tenderness. No wagner.   Skin: No rashes  Vascular Access: LUE AV access +thrill and bruit.     LABS:  07-27    138  |  96<L>  |  45<H>  ----------------------------<  123<H>  4.2   |  23  |  6.08<H>    Ca    9.1      27 Jul 2021 03:53  Phos  4.8     07-27  Mg     2.20     07-27    TPro  6.2  /  Alb  2.3<L>  /  TBili  2.0<H>  /  DBili      /  AST  67<H>  /  ALT  32  /  AlkPhos  340<H>  07-27    Creatinine Trend: 6.08 <--, 5.06 <--, 4.13 <--, 5.33 <--, 4.11 <--, 5.90 <--, 4.75 <--                        8.6    11.40 )-----------( 266      ( 27 Jul 2021 03:53 )             29.6     Urine Studies:      RADIOLOGY & ADDITIONAL STUDIES:

## 2021-07-27 NOTE — PROGRESS NOTE ADULT - SUBJECTIVE AND OBJECTIVE BOX
EP    Date of Service: 07/27/2021    tele: NSR, PVCs, no significant events  lethargic, ROS unable to be obtained  family at bedside    Review of Systems:   Constitutional: [ ] fevers, [ ] chills.   Skin: [ ] dry skin. [ ] rashes.  Psychiatric: [ ] depression, [ ] anxiety.   Gastrointestinal: [ ] BRBPR, [ ] melena.   Neurological: [ ] confusion. [ ] seizures. [ ] shuffling gait.   Ears,Nose,Mouth and Throat: [ ] ear pain [ ] sore throat.   Eyes: [ ] diplopia.   Respiratory: [ ] hemoptysis. [ ] shortness of breath  Cardiovascular: See HPI above  Hematologic/Lymphatic: [ ] anemia. [ ] painful nodes. [ ] prolonged bleeding.   Genitourinary: [ ] hematuria. [ ] flank pain.   Endocrine: [ ] significant change in weight. [ ] intolerance to heat and cold.     Review of systems [ ] otherwise negative, [x ] otherwise unable to obtain    FH: no family history of sudden cardiac death in first degree relatives    SH: [ ] tobacco, [ ] alcohol, [ ] drugs      MEDICATIONS  (STANDING):  atorvastatin 40 milliGRAM(s) Oral at bedtime  cefepime   IVPB 1000 milliGRAM(s) IV Intermittent daily  chlorhexidine 2% Cloths 1 Application(s) Topical daily  dextrose 40% Gel 15 Gram(s) Oral once  dextrose 40% Gel 15 Gram(s) Oral once  dextrose 5%. 1000 milliLiter(s) (50 mL/Hr) IV Continuous <Continuous>  dextrose 5%. 1000 milliLiter(s) (100 mL/Hr) IV Continuous <Continuous>  dextrose 50% Injectable 25 Gram(s) IV Push once  dextrose 50% Injectable 12.5 Gram(s) IV Push once  dextrose 50% Injectable 25 Gram(s) IV Push once  diltiazem    Tablet 30 milliGRAM(s) Oral four times a day  epoetin danilo-epbx (RETACRIT) Injectable 40735 Unit(s) IV Push <User Schedule>  glucagon  Injectable 1 milliGRAM(s) IntraMuscular once  insulin lispro (ADMELOG) corrective regimen sliding scale   SubCutaneous every 6 hours  midodrine 20 milliGRAM(s) Oral every 8 hours  norepinephrine Infusion 0.05 MICROgram(s)/kG/Min (8.08 mL/Hr) IV Continuous <Continuous>  pantoprazole    Tablet 40 milliGRAM(s) Oral two times a day  vancomycin  IVPB 750 milliGRAM(s) IV Intermittent <User Schedule>    MEDICATIONS  (PRN):  acetaminophen   Tablet .. 1000 milliGRAM(s) Oral every 6 hours PRN Temp greater or equal to 38C (100.4F), Mild Pain (1 - 3), Moderate Pain (4 - 6)  calamine/zinc oxide Lotion 1 Application(s) Topical three times a day PRN Itching      LABS:                          8.6    11.40 )-----------( 266      ( 27 Jul 2021 03:53 )             29.6     Hemoglobin: 8.6 g/dL (07-27 @ 03:53)  Hemoglobin: 8.0 g/dL (07-26 @ 04:25)  Hemoglobin: 7.2 g/dL (07-25 @ 04:09)  Hemoglobin: 7.8 g/dL (07-24 @ 03:25)  Hemoglobin: 7.9 g/dL (07-23 @ 01:31)    07-27    138  |  96<L>  |  45<H>  ----------------------------<  123<H>  4.2   |  23  |  6.08<H>    Ca    9.1      27 Jul 2021 03:53  Phos  4.8     07-27  Mg     2.20     07-27    TPro  6.2  /  Alb  2.3<L>  /  TBili  2.0<H>  /  DBili  x   /  AST  67<H>  /  ALT  32  /  AlkPhos  340<H>  07-27    Creatinine Trend: 6.08<--, 5.06<--, 4.13<--, 5.33<--, 4.11<--, 5.90<--   PTT - ( 27 Jul 2021 03:53 )  PTT:38.8 sec          07-27-21 @ 07:01  -  07-27-21 @ 13:34  --------------------------------------------------------  IN: 100 mL / OUT: 0 mL / NET: 100 mL        PHYSICAL EXAM  Vital Signs Last 24 Hrs  T(C): 36.7 (27 Jul 2021 12:00), Max: 37.2 (27 Jul 2021 04:00)  T(F): 98 (27 Jul 2021 12:00), Max: 98.9 (27 Jul 2021 04:00)  HR: 86 (27 Jul 2021 12:00) (66 - 89)  BP: 128/48 (27 Jul 2021 12:00) (91/71 - 150/115)  BP(mean): 67 (27 Jul 2021 12:00) (54 - 124)  RR: 22 (27 Jul 2021 12:00) (19 - 31)  SpO2: 89% (27 Jul 2021 12:00) (89% - 100%)      Head: Normocephalic and atraumatic.   Neck: No JVD. No bruits. Supple. Does not appear to be enlarged.   Cardiovascular: + S1,S2 ; RRR Soft systolic murmur at the left lower sternal border. No rubs noted.    Lungs: CTA b/l. No rhonchi, rales or wheezes.   Abdomen: + BS, soft. Non tender. Non distended. No rebound. No guarding.   Extremities: No clubbing/cyanosis/edema.   Neurologic: Moves all four extremities. Full range of motion.   Skin: Warm and moist. The patient's skin has normal elasticity and good skin turgor.       A/P) 69 y/o male PMH CAD s/p CABG, PAF/AFL on coumadin, HTN, hyperlipidemia, DM, ESRD on HD, hypothyroidism a/w sepsis from mitral valve endocarditis. Had a PEA arrest while in the hospital.     -recommend lifelong a/c for PAF.  Spontaneous conversion back to NSR in hospital.  -s/p PEA/jay/asystole arrest, secondary to critical illness and infection.  no pacemaker or ICD indicated at this time  -Endocarditis to be medically managed for now, refused by CTS for invasive management.  -no further inpatient EP workup expected but will follow  -palliative follow up  -supportive care as per DANIELA Robin PA-C  Pager: [phone]

## 2021-07-27 NOTE — PROGRESS NOTE ADULT - ASSESSMENT
Assessment and Plan: 	  71 yo M with PMhx of CAD s/p CABG (course at that time complicated by pleural effusion requiring chest tubes), ESRD on HD (MWF), DMII, HTN, afib on coumadin, and anemia transferred from [facility] for evaluation of chest pain after HD and GIB s/p 2U PRBC transfusion and aflutter/afib with RVR managed with metoprolol. Admitted to MICU for hypotension dependent on pressors likely 2/2 to sepsis. In MICU, patient had a cardiac arrest on 7/13 requiring CPR, 3x epi, and 2x shock (for VTach). Course now complicated by recent drop in hgb requiring pRBCs. CT of leg shows hematoma on right adductor. Patient is lethargic, suggesting compromised mental status. Differential etiology to explain AMS would include neurological versus metabolic. Plan for head CT and RUQ US today to further investigate these possible etiologies.    #Neuro  - Off of sedation. Alert, lethargic, not following commands  - CT head shows foci of low density in the R anterolateral frontal lobe and L posterior temporal lobe concerning for acute infarcts --> will repeat head CT today, given declining mental status  - Mechanism likely cardioembolic in setting of patient with known atrial fibrillation with cardiac arrest vs septic emboli  - per neurology, can consider MRI brain w/o contrast. However, unlikely to  since patient has known atrial fibrillation and will need to be continued of full dose anticoagulation. Currently holding given recent bleeding  - Avoid hypotension, keep -180 for neuroprotection  - c/w Atorvastatin 40MG QHS for secondary stroke prevention.  - more lethargic during team rounds, obtained ABG on 7/25 to evaluate change in mental status --> pH 7.38 wnl, pCO2 50 slightly elevated    #Cardiovascular  - Cardiac arrest: Patient had asystole night of 7/13 and CPR was started. S/p epi x3 and shock x2 with return to NSR. Cards is following  - TTE on 7/13 showed Mild mitral regurgitation with biventricular dysfunction of LV and RV systolic function. Endocardium was not well visualized, Hypokinesis of the basal inferior wall and basal inferoseptum.  - TRUMAN on 7/19 showed a large echodensity consisted with endocarditis and PFO  - Cardiology spoke with CTS, no surgery at this time. (Dr. Herzog)  - c/w Levophed wean as tolerated. Currently stable on 0.05  - c/  midodrine 20mg TID to help assist weaning off of pressors   - BP goal is >100 systolic  - troponinemia likely secondary to demand ischemia and ESRD     A. Fib  - on coumadin at home,  - holding heparin in setting of recent drop in hgb  - From neurology stand point, heparin is sufficient for now. Will need to be switched to DOAC in the future if kidney function allows  - 7/11: tried to transition heparin gtt to eliquis but pt is not a candidate due to his acute MIGUEL; back on heparin gtt  - c/ diltiazem for rate control    #Respiratory  - Loculated Pleural effusions: chest tube removed  - pleural fluid cx: (-); transudative.  - Currently O2 sat 99% on 6L NC. Tried to wean down to 4L and patient desatted, will keep 6L at this time  - CXR s/p chest tube removal showed no evidence of atelectasis. Demonstrated loculated left pleural effusion decreased in size and new hazy right lower lung opacity which could be due to a small layering right pleural effusion with associated passive atelectasis, atelectasis of other cause, and/or pneumonia.  - abx as below    #GI/Nutrition:  - NPO with tube feed  - alk phos elevated at 310 this AM (prev 219 1d ago), steadily uptrending -->given this finding combined with AMS, will obtain urgent RUQ US  - Was c/o abdominal pain on presentation, bedside POCUS didn't show ascites. Continue to monitor.   - negative occult bleeding, hgb stable  - Per GI, recent decrease in hgb less likely to be a GIB, appreciate recs  - FOBT negative  - Zofran PRN for Nausea.    #/Renal  - ESRD: HD on MWF. Most recent Scr 4.6. Potassium is stable.   - Last dialysis session 7/24. Received dialysis overnight 7/25.  - Recommend removing 2-3L of fluids as patient is net positive for I&Os. Discussed with nephro  - Electrolytes stable  - c/w Epo 20k tiw with HD  - Scotal US demonstrates swelling in the scrotum, improving with dialysis yesterday. likely in setting of fluid overload. Manage as above      #Skin  - HD Access: fistula in left upper arm  - Improving erythematous and tender on right wrist. Continue to monitor. RUE US neg for thrombus  - US of fistula showed patent fistula with no abscess  - Arrow is patent in right upper extremity.    #ID  - S aureus PCR: (+),  MRSA PCR negative  - BCx on 7/7 now growing MRSA: restarted on vanc (7/11- )  - Vanc 7/23 was 24, dose held, plan for after dialysis saturday 7/24 --> next dose due 7/27  - Blood Cx 7/22 growing gram negative rods, Serratia on Cefepime (7/24 - ).    #Endocrine  - DMII: c/w Lispro SS. Monitor glucose.      #Hematologic/DVT ppx  - monitor PTT and cbc q12  - Will hold heparin in setting of recent decrease in hgb. No transfusion needed o/n  - Patient complaining of right thigh pain. CTA demonstrated A moderate-sized hematoma in the right adductor muscle group.  - Contacted surgery regarding the hematoma, no surgery at this time. c/ with ACE wrap at this time.  - PRN dilaudid for pain control  - Leukocytosis as above    #Ethics  - full code as per wife- full code as per wife Assessment and Plan: 	  71 yo M with PMhx of CAD s/p CABG (course at that time complicated by pleural effusion requiring chest tubes), ESRD on HD (MWF), DMII, HTN, afib on coumadin, and anemia transferred from [facility] for evaluation of chest pain after HD and GIB s/p 2U PRBC transfusion and aflutter/afib with RVR managed with metoprolol. Admitted to MICU for hypotension dependent on pressors likely 2/2 to sepsis. In MICU, patient had a cardiac arrest on 7/13 requiring CPR, 3x epi, and 2x shock (for VTach). Course now complicated by recent drop in hgb requiring pRBCs. CT of leg shows hematoma on right adductor. Patient is lethargic, suggesting compromised mental status. AMS likely 2/2 R frontal CVA and anoxic brain injury in the setting of cardiac arrest. Patient appropriate for transfer to medicine floor when bed is available.    #Neuro  - Off of sedation. Alert, lethargic, not following commands  - CT head shows foci of low density in the R anterolateral frontal lobe and L posterior temporal lobe concerning for acute infarcts --> repeat CT head 7/27 shows no acute infarcts/hemorrhages, +evolving R frontal infarct  - Mechanism likely cardioembolic in setting of patient with known atrial fibrillation with cardiac arrest vs septic emboli  - per neurology, can consider MRI brain w/o contrast. However, unlikely to  since patient has known atrial fibrillation and will need to be continued of full dose anticoagulation. Currently holding given recent bleeding  - Avoid hypotension, keep -180 for neuroprotection  - c/w Atorvastatin 40MG QHS for secondary stroke prevention.  - more lethargic during team rounds, obtained ABG on 7/25 to evaluate change in mental status --> pH 7.38 wnl    #Cardiovascular  - Cardiac arrest: Patient had asystole night of 7/13 and CPR was started. S/p epi x3 and shock x2 with return to NSR. Cards is following  - TTE on 7/13 showed Mild mitral regurgitation with biventricular dysfunction of LV and RV systolic function. Endocardium was not well visualized, Hypokinesis of the basal inferior wall and basal inferoseptum.  - TRUMAN on 7/19 showed a large echodensity consisted with endocarditis and PFO  - Cardiology spoke with CTS, no surgery at this time. (Dr. Herzog)  - c/w Levophed wean as tolerated. Currently stable on 0.05  - c/w midodrine 20mg TID to help assist weaning off of pressors   - BP goal is >100 systolic  - troponinemia likely secondary to demand ischemia and ESRD     A. Fib  - on coumadin at home,  - holding heparin in setting of recent drop in hgb  - From neurology stand point, heparin is sufficient for now. Will need to be switched to DOAC in the future if kidney function allows  - 7/11: tried to transition heparin gtt to eliquis but pt is not a candidate due to his acute MIGUEL; back on heparin gtt  - c/w diltiazem for rate control    #Respiratory  - Loculated Pleural effusions: chest tube removed  - pleural fluid cx: (-); transudative.  - Currently O2 sat 99% on 6L NC. Tried to wean down to 4L and patient desatted, will keep 6L at this time  - CXR s/p chest tube removal showed no evidence of atelectasis. Demonstrated loculated left pleural effusion decreased in size and new hazy right lower lung opacity which could be due to a small layering right pleural effusion with associated passive atelectasis, atelectasis of other cause, and/or pneumonia.  - abx as below    #GI/Nutrition:  - advanced to dysphagia diet on 7/27  - RUQ US 7/26 - no acute pathology, liver findings consistent with known HF  - Was c/o abdominal pain on presentation, issue has resolved (patient appears comfortable)  - negative occult bleeding, hgb stable  - FOBT negative  - Zofran PRN for Nausea.    #/Renal  - ESRD: HD on MWF. Most recent Scr 4.6. Potassium is stable.   - Last dialysis session 7/24. Received dialysis overnight 7/25. HD planned for 7/27  - Recommend removing 2-3L of fluids as patient is net positive for I&Os. Discussed with nephro  - Electrolytes stable  - c/w Epo 20k tiw with HD  - Scotal US demonstrates swelling in the scrotum, improving with dialysis yesterday. likely in setting of fluid overload. Manage as above      #Skin  - HD Access: fistula in left upper arm  - Improving erythematous and tender on right wrist. Continue to monitor. RUE US neg for thrombus  - US of fistula showed patent fistula with no abscess  - Arrow is patent in right upper extremity.    #ID  - S aureus PCR: (+),  MRSA PCR negative  - BCx on 7/7 now growing MRSA: restarted on vanc (7/11- )  - Vanc level 7/23 was 24, dose held, plan for after dialysis saturday 7/24 --> next dose due 7/27  - Blood Cx 7/22 growing gram negative rods, Serratia on Cefepime (7/24 - ).  -discussed case with ID. They discourage narrowing to ceftriaxone due to Serratia's tendency to develop beta lactamase. Alternative agent would be cipro. If desired to rule-out possibility of additional infectious source, rec CT abdomen/pelvis.    #Endocrine  - DMII: c/w Lispro SS. Monitor glucose.      #Hematologic/DVT ppx  - monitor PTT and cbc q12  - Will hold heparin in setting of recent decrease in hgb. No transfusion needed o/n  - Patient complaining of right thigh pain. CTA demonstrated A moderate-sized hematoma in the right adductor muscle group.  - Contacted surgery regarding the hematoma, no surgery at this time. c/w with ACE wrap at this time.  - PRN dilaudid for pain control  - Leukocytosis as above    #Ethics  - full code as per wife- full code as per wife Assessment and Plan: 	  71 yo M with PMhx of CAD s/p CABG (course at that time complicated by pleural effusion requiring chest tubes), ESRD on HD (MWF), DMII, HTN, afib on coumadin, and anemia transferred from [facility] for evaluation of chest pain after HD and GIB s/p 2U PRBC transfusion and aflutter/afib with RVR managed with metoprolol. Admitted to MICU for hypotension dependent on pressors likely 2/2 to sepsis. In MICU, patient had a cardiac arrest on 7/13 requiring CPR, 3x epi, and 2x shock (for VTach). Course now complicated by recent drop in hgb requiring pRBCs. CT of leg shows hematoma on right adductor. Patient is lethargic, suggesting compromised mental status. AMS likely 2/2 R frontal CVA and anoxic brain injury in the setting of cardiac arrest. Patient appropriate for transfer to medicine floor when bed is available.    #Neuro  - Off of sedation. Alert, lethargic, not following commands  - CT head shows foci of low density in the R anterolateral frontal lobe and L posterior temporal lobe concerning for acute infarcts --> repeat CT head 7/27 shows no acute infarcts/hemorrhages, +evolving R frontal infarct  - Mechanism likely cardioembolic in setting of patient with known atrial fibrillation with cardiac arrest vs septic emboli  - per neurology, can consider MRI brain w/o contrast. However, unlikely to  since patient has known atrial fibrillation and will need to be continued of full dose anticoagulation. Currently holding given recent bleeding  - Avoid hypotension, keep -180 for neuroprotection  - c/w Atorvastatin 40MG QHS for secondary stroke prevention.  - more lethargic during team rounds, obtained ABG on 7/25 to evaluate change in mental status --> pH 7.38 wnl    #Cardiovascular  - Cardiac arrest: Patient had asystole night of 7/13 and CPR was started. S/p epi x3 and shock x2 with return to NSR. Cards is following  - TTE on 7/13 showed Mild mitral regurgitation with biventricular dysfunction of LV and RV systolic function. Endocardium was not well visualized, Hypokinesis of the basal inferior wall and basal inferoseptum.  - TRUMAN on 7/19 showed a large echodensity consisted with endocarditis and PFO  - Cardiology spoke with CTS, no surgery at this time. (Dr. Herzog)  - c/w Levophed wean as tolerated. Currently stable on 0.05  - c/w midodrine 20mg TID to help assist weaning off of pressors   - BP goal is >100 systolic  - troponinemia likely secondary to demand ischemia and ESRD     A. Fib  - on coumadin at home,  - holding heparin in setting of recent drop in hgb  - From neurology stand point, heparin is sufficient for now. Will need to be switched to DOAC in the future if kidney function allows  - 7/11: tried to transition heparin gtt to eliquis but pt is not a candidate due to his acute MIGUEL; back on heparin gtt  - c/w diltiazem for rate control    #Respiratory  - Loculated Pleural effusions: chest tube removed  - pleural fluid cx: (-); transudative.  - Currently O2 sat 99% on 6L NC. Tried to wean down to 4L and patient desatted, will keep 6L at this time  - CXR s/p chest tube removal showed no evidence of atelectasis. Demonstrated loculated left pleural effusion decreased in size and new hazy right lower lung opacity which could be due to a small layering right pleural effusion with associated passive atelectasis, atelectasis of other cause, and/or pneumonia.  - abx as below    #GI/Nutrition:  - advanced to dysphagia diet on 7/27  - RUQ US 7/26 - no acute pathology, liver findings consistent with known HF  - Was c/o abdominal pain on presentation, issue has resolved (patient appears comfortable)  - negative occult bleeding, hgb stable  - FOBT negative  - Zofran PRN for Nausea.    #/Renal  - ESRD: HD on MWF. Most recent Scr 4.6. Potassium is stable.   - Last dialysis session 7/24. Received dialysis overnight 7/25. HD planned for 7/27  - Recommend removing 2-3L of fluids as patient is net positive for I&Os. Discussed with nephro  - Electrolytes stable  - c/w Epo 20k tiw with HD  - Scotal US demonstrates swelling in the scrotum, improving with dialysis yesterday. likely in setting of fluid overload. Manage as above      #Skin  - HD Access: fistula in left upper arm  - Improving erythematous and tender on right wrist. Continue to monitor. RUE US neg for thrombus  - US of fistula showed patent fistula with no abscess  - Arrow is patent in right upper extremity.    #ID  - S aureus PCR: (+),  MRSA PCR negative  - BCx on 7/7 now growing MRSA: restarted on vanc (7/11- )  - Vanc level 7/23 was 24, dose held, plan for after dialysis saturday 7/24 --> next dose due 7/27  - Blood Cx 7/22 growing gram negative rods, Serratia on Cefepime (7/24 - ).  -discussed case with ID. They discourage narrowing to ceftriaxone due to Serratia's tendency to develop beta lactamase. Alternative agent would be cipro. If desired to rule-out possibility of additional infectious source, rec CT abdomen/pelvis.    #Endocrine  - DMII: c/w Lispro SS. Monitor glucose.      #Hematologic/DVT ppx  - monitor PTT and cbc q12  - holding heparin in setting of hematoma and GIB  - electrophysiology consult - recommended lifelong AC in setting of PEA, will start if appropriate  - Patient complaining of right thigh pain. CTA demonstrated A moderate-sized hematoma in the right adductor muscle group.  - Contacted surgery regarding the hematoma, no surgery at this time. c/w with ACE wrap at this time.  - PRN dilaudid for pain control    #Ethics  - DNR/DNI

## 2021-07-28 DIAGNOSIS — R78.81 BACTEREMIA: ICD-10-CM

## 2021-07-28 DIAGNOSIS — Z71.89 OTHER SPECIFIED COUNSELING: ICD-10-CM

## 2021-07-28 DIAGNOSIS — I38 ENDOCARDITIS, VALVE UNSPECIFIED: ICD-10-CM

## 2021-07-28 DIAGNOSIS — I48.91 UNSPECIFIED ATRIAL FIBRILLATION: ICD-10-CM

## 2021-07-28 DIAGNOSIS — I63.9 CEREBRAL INFARCTION, UNSPECIFIED: ICD-10-CM

## 2021-07-28 DIAGNOSIS — I10 ESSENTIAL (PRIMARY) HYPERTENSION: ICD-10-CM

## 2021-07-28 LAB
ANION GAP SERPL CALC-SCNC: 15 MMOL/L — HIGH (ref 7–14)
BUN SERPL-MCNC: 28 MG/DL — HIGH (ref 7–23)
CALCIUM SERPL-MCNC: 9.2 MG/DL — SIGNIFICANT CHANGE UP (ref 8.4–10.5)
CHLORIDE SERPL-SCNC: 100 MMOL/L — SIGNIFICANT CHANGE UP (ref 98–107)
CO2 SERPL-SCNC: 24 MMOL/L — SIGNIFICANT CHANGE UP (ref 22–31)
CREAT SERPL-MCNC: 4.14 MG/DL — HIGH (ref 0.5–1.3)
GLUCOSE BLDC GLUCOMTR-MCNC: 130 MG/DL — HIGH (ref 70–99)
GLUCOSE BLDC GLUCOMTR-MCNC: 131 MG/DL — HIGH (ref 70–99)
GLUCOSE BLDC GLUCOMTR-MCNC: 135 MG/DL — HIGH (ref 70–99)
GLUCOSE BLDC GLUCOMTR-MCNC: 154 MG/DL — HIGH (ref 70–99)
GLUCOSE BLDC GLUCOMTR-MCNC: 158 MG/DL — HIGH (ref 70–99)
GLUCOSE SERPL-MCNC: 149 MG/DL — HIGH (ref 70–99)
HCT VFR BLD CALC: 29.3 % — LOW (ref 39–50)
HGB BLD-MCNC: 8.4 G/DL — LOW (ref 13–17)
MCHC RBC-ENTMCNC: 26.5 PG — LOW (ref 27–34)
MCHC RBC-ENTMCNC: 28.7 GM/DL — LOW (ref 32–36)
MCV RBC AUTO: 92.4 FL — SIGNIFICANT CHANGE UP (ref 80–100)
NRBC # BLD: 3 /100 WBCS — SIGNIFICANT CHANGE UP
NRBC # FLD: 0.42 K/UL — HIGH
PLATELET # BLD AUTO: 263 K/UL — SIGNIFICANT CHANGE UP (ref 150–400)
POTASSIUM SERPL-MCNC: 3.6 MMOL/L — SIGNIFICANT CHANGE UP (ref 3.5–5.3)
POTASSIUM SERPL-SCNC: 3.6 MMOL/L — SIGNIFICANT CHANGE UP (ref 3.5–5.3)
RBC # BLD: 3.17 M/UL — LOW (ref 4.2–5.8)
RBC # FLD: 19.7 % — HIGH (ref 10.3–14.5)
SODIUM SERPL-SCNC: 139 MMOL/L — SIGNIFICANT CHANGE UP (ref 135–145)
WBC # BLD: 13.56 K/UL — HIGH (ref 3.8–10.5)
WBC # FLD AUTO: 13.56 K/UL — HIGH (ref 3.8–10.5)

## 2021-07-28 PROCEDURE — 99232 SBSQ HOSP IP/OBS MODERATE 35: CPT

## 2021-07-28 PROCEDURE — 99233 SBSQ HOSP IP/OBS HIGH 50: CPT | Mod: GC

## 2021-07-28 RX ORDER — DEXTROSE 50 % IN WATER 50 %
25 SYRINGE (ML) INTRAVENOUS ONCE
Refills: 0 | Status: DISCONTINUED | OUTPATIENT
Start: 2021-07-28 | End: 2021-07-29

## 2021-07-28 RX ORDER — INSULIN LISPRO 100/ML
VIAL (ML) SUBCUTANEOUS AT BEDTIME
Refills: 0 | Status: DISCONTINUED | OUTPATIENT
Start: 2021-07-28 | End: 2021-07-29

## 2021-07-28 RX ORDER — SODIUM CHLORIDE 9 MG/ML
1000 INJECTION, SOLUTION INTRAVENOUS
Refills: 0 | Status: DISCONTINUED | OUTPATIENT
Start: 2021-07-28 | End: 2021-07-29

## 2021-07-28 RX ORDER — GLUCAGON INJECTION, SOLUTION 0.5 MG/.1ML
1 INJECTION, SOLUTION SUBCUTANEOUS ONCE
Refills: 0 | Status: DISCONTINUED | OUTPATIENT
Start: 2021-07-28 | End: 2021-07-29

## 2021-07-28 RX ORDER — MIDODRINE HYDROCHLORIDE 2.5 MG/1
10 TABLET ORAL EVERY 8 HOURS
Refills: 0 | Status: DISCONTINUED | OUTPATIENT
Start: 2021-07-28 | End: 2021-07-29

## 2021-07-28 RX ORDER — INSULIN LISPRO 100/ML
VIAL (ML) SUBCUTANEOUS
Refills: 0 | Status: DISCONTINUED | OUTPATIENT
Start: 2021-07-28 | End: 2021-07-29

## 2021-07-28 RX ORDER — DEXTROSE 50 % IN WATER 50 %
12.5 SYRINGE (ML) INTRAVENOUS ONCE
Refills: 0 | Status: DISCONTINUED | OUTPATIENT
Start: 2021-07-28 | End: 2021-07-29

## 2021-07-28 RX ORDER — DEXTROSE 50 % IN WATER 50 %
15 SYRINGE (ML) INTRAVENOUS ONCE
Refills: 0 | Status: DISCONTINUED | OUTPATIENT
Start: 2021-07-28 | End: 2021-07-29

## 2021-07-28 RX ADMIN — CEFEPIME 100 MILLIGRAM(S): 1 INJECTION, POWDER, FOR SOLUTION INTRAMUSCULAR; INTRAVENOUS at 12:42

## 2021-07-28 RX ADMIN — CHLORHEXIDINE GLUCONATE 1 APPLICATION(S): 213 SOLUTION TOPICAL at 12:42

## 2021-07-28 RX ADMIN — Medication 1: at 09:40

## 2021-07-28 NOTE — PROGRESS NOTE ADULT - SUBJECTIVE AND OBJECTIVE BOX
Follow Up:      [place] History/ROS:Patient is a 70y old  Male who presents with a chief complaint of Chest pain and GIB (28 Jul 2021 09:14)    Afebrile  tx from ICU to the floor    Allergies    lisinopril (Other)  opioid-like analgesics (Other)    Intolerances        ANTIMICROBIALS:  cefepime   IVPB 1000 daily  vancomycin  IVPB 750 <User Schedule>      OTHER MEDS:  acetaminophen   Tablet .. 1000 milliGRAM(s) Oral every 6 hours PRN  atorvastatin 40 milliGRAM(s) Oral at bedtime  calamine/zinc oxide Lotion 1 Application(s) Topical three times a day PRN  chlorhexidine 2% Cloths 1 Application(s) Topical daily  dextrose 40% Gel 15 Gram(s) Oral once  dextrose 5%. 1000 milliLiter(s) IV Continuous <Continuous>  dextrose 5%. 1000 milliLiter(s) IV Continuous <Continuous>  dextrose 50% Injectable 25 Gram(s) IV Push once  dextrose 50% Injectable 12.5 Gram(s) IV Push once  dextrose 50% Injectable 25 Gram(s) IV Push once  diltiazem    Tablet 30 milliGRAM(s) Oral four times a day  epoetin danilo-epbx (RETACRIT) Injectable 08477 Unit(s) IV Push <User Schedule>  glucagon  Injectable 1 milliGRAM(s) IntraMuscular once  insulin lispro (ADMELOG) corrective regimen sliding scale   SubCutaneous three times a day before meals  insulin lispro (ADMELOG) corrective regimen sliding scale   SubCutaneous at bedtime  midodrine 20 milliGRAM(s) Oral every 8 hours  pantoprazole    Tablet 40 milliGRAM(s) Oral two times a day      Vital Signs Last 24 Hrs  T(C): 36.9 (28 Jul 2021 09:13), Max: 36.9 (28 Jul 2021 09:13)  T(F): 98.5 (28 Jul 2021 09:13), Max: 98.5 (28 Jul 2021 09:13)  HR: 88 (28 Jul 2021 09:13) (83 - 100)  BP: 133/64 (28 Jul 2021 09:13) (64/52 - 147/74)  BP(mean): 73 (27 Jul 2021 18:00) (45 - 108)  RR: 20 (28 Jul 2021 09:13) (18 - 31)  SpO2: 100% (28 Jul 2021 09:13) (89% - 100%)    PHYSICAL EXAM:  General: [ ] non-toxic  HEAD/EYES: [ ] PERRL [ ] white sclera [ ] icterus  ENT:  [ ] normal [ ] supple [ ] thrush [ ] pharyngeal exudate  Cardiovascular:   [ ] murmur [ ] normal [ ] PPM/AICD  Respiratory:  [ ] clear to ausculation bilaterally  GI:  [ ] soft, non-tender, normal bowel sounds  :  [ ] wagner [ ] no CVA tenderness   Musculoskeletal:  [ ] no synovitis  Neurologic:  [ ] non-focal exam   Skin:  [ ] no rash  Lymph: [ ] no lymphadenopathy  Psychiatric:  [ ] appropriate affect [ ] alert & oriented  Lines:  [ ] no phlebitis [ ] central line                                8.4    13.56 )-----------( 263      ( 28 Jul 2021 07:33 )             29.3       07-28    139  |  100  |  28<H>  ----------------------------<  149<H>  3.6   |  24  |  4.14<H>    Ca    9.2      28 Jul 2021 07:33  Phos  4.8     07-27  Mg     2.20     07-27    TPro  6.2  /  Alb  2.3<L>  /  TBili  2.0<H>  /  DBili  x   /  AST  67<H>  /  ALT  32  /  AlkPhos  340<H>  07-27          MICROBIOLOGY:Culture Results:   No growth to date. (07-24-21 @ 18:47)  Culture Results:   Growth in aerobic and anaerobic bottles: Serratia marcescens  See previous culture [id number] (07-24-21 @ 14:28)  Culture Results:   Growth in aerobic bottle: Serratia marcescens  Growth in anaerobic bottle: Gram Negative Rods  ***Blood Panel PCR results on this specimen are available  approximately 3 hours after the Gram stain result.***  Gram stain, PCR, and/or culture resultsmay not always  correspond due to difference in methodologies.  ************************************************************  This PCR assay was performed by multiplex PCR. This  Assay tests for 66 bacterial and resistance gene targets.  Please referto the [facility] test directory  at [email or web address] for details. (07-22-21 @ 22:57)      RADIOLOGY:

## 2021-07-28 NOTE — PROGRESS NOTE ADULT - SUBJECTIVE AND OBJECTIVE BOX
Date of Service: 07/28/2021    S: Lethargic, only opens eyes. Unable to obtain ROS.    Review of Systems:   Constitutional: [ ] fevers, [ ] chills.   Skin: [ ] dry skin. [ ] rashes.  Psychiatric: [ ] depression, [ ] anxiety.   Gastrointestinal: [ ] BRBPR, [ ] melena.   Neurological: [ ] confusion. [ ] seizures. [ ] shuffling gait.   Ears,Nose,Mouth and Throat: [ ] ear pain [ ] sore throat.   Eyes: [ ] diplopia.   Respiratory: [ ] hemoptysis. [ ] shortness of breath  Cardiovascular: See HPI above  Hematologic/Lymphatic: [ ] anemia. [ ] painful nodes. [ ] prolonged bleeding.   Genitourinary: [ ] hematuria. [ ] flank pain.   Endocrine: [ ] significant change in weight. [ ] intolerance to heat and cold.     Review of systems [ ] otherwise negative, [x ] otherwise unable to obtain    FH: no family history of sudden cardiac death in first degree relatives    SH: [ ] tobacco, [ ] alcohol, [ ] drugs    acetaminophen   Tablet .. 1000 milliGRAM(s) Oral every 6 hours PRN  atorvastatin 40 milliGRAM(s) Oral at bedtime  calamine/zinc oxide Lotion 1 Application(s) Topical three times a day PRN  cefepime   IVPB 1000 milliGRAM(s) IV Intermittent daily  chlorhexidine 2% Cloths 1 Application(s) Topical daily  dextrose 40% Gel 15 Gram(s) Oral once  dextrose 5%. 1000 milliLiter(s) IV Continuous <Continuous>  dextrose 5%. 1000 milliLiter(s) IV Continuous <Continuous>  dextrose 50% Injectable 25 Gram(s) IV Push once  dextrose 50% Injectable 12.5 Gram(s) IV Push once  dextrose 50% Injectable 25 Gram(s) IV Push once  diltiazem    Tablet 30 milliGRAM(s) Oral four times a day  epoetin danilo-epbx (RETACRIT) Injectable 15159 Unit(s) IV Push <User Schedule>  glucagon  Injectable 1 milliGRAM(s) IntraMuscular once  insulin lispro (ADMELOG) corrective regimen sliding scale   SubCutaneous three times a day before meals  insulin lispro (ADMELOG) corrective regimen sliding scale   SubCutaneous at bedtime  midodrine 10 milliGRAM(s) Oral every 8 hours  pantoprazole    Tablet 40 milliGRAM(s) Oral two times a day  vancomycin  IVPB 750 milliGRAM(s) IV Intermittent <User Schedule>                            8.4    13.56 )-----------( 263      ( 28 Jul 2021 07:33 )             29.3       07-28    139  |  100  |  28<H>  ----------------------------<  149<H>  3.6   |  24  |  4.14<H>    Ca    9.2      28 Jul 2021 07:33  Phos  4.8     07-27  Mg     2.20     07-27    TPro  6.2  /  Alb  2.3<L>  /  TBili  2.0<H>  /  DBili  x   /  AST  67<H>  /  ALT  32  /  AlkPhos  340<H>  07-27            T(C): 37.1 (07-28-21 @ 10:00), Max: 37.1 (07-28-21 @ 10:00)  HR: 88 (07-28-21 @ 09:13) (83 - 100)  BP: 133/64 (07-28-21 @ 09:13) (64/52 - 147/74)  RR: 20 (07-28-21 @ 09:13) (18 - 31)  SpO2: 100% (07-28-21 @ 09:13) (89% - 100%)  Wt(kg): --    I&O's Summary    27 Jul 2021 07:01  -  28 Jul 2021 07:00  --------------------------------------------------------  IN: 756 mL / OUT: 3400 mL / NET: -2644 mL      Gen: Appears well in NAD  HEENT:  (-)icterus (-)pallor  CV: N S1 S2 1/6 DEJAH (+)2 Pulses B/l  Resp:  Clear to auscultation B/L, normal effort  GI: (+) BS Soft, NT, ND  Lymph:  (-)Edema, (-)obvious lymphadenopathy  Skin: Warm to touch, Normal turgor  Psych:  unable to adequately assess mood and affect    DATA  < from: Transthoracic Echocardiogram (07.07.21 @ 18:17) >  ------------------------------------------------------------------------  CONCLUSIONS:  1. Mitral annular calcification, otherwise normal mitral  valve. Minimal mitral regurgitation.  2. Endocardium not well visualized; grossly normal left  ventricular systolic function.  3. Right ventricular enlargement with decreased right  ventricular systolic function.  4. Inadequate tricuspid regurgitation Doppler envelope  precludes estimation of RVSP.  ------------------------------------------------------------------------  Confirmed on  7/7/2021 - 21:16:20 by Osvaldo Bertrand M.D.,  [facility], [facility]    < end of copied text >      A/P) 69 y/o male PMH CAD s/p CABG and old PCI, AFL x 1 year on coumadin, HTN, hyperlipidemia, DM, ESRD on HD, and hypothyroidism originally admitted to [facility] with GIB, transferred to [facility], found to have septic shock and MRSA bacteremia, trx to MICU s/p PEA arrest, intubated and now extubated    -Given MRSA bacteremia, TRUMAN performed demonstrating MV endocarditis - discussed with CTS Dr. Pierce - pt. not a surgical candidate at this time   -Abx per ID, blood cx now + for serratia so zosyn added  -[facility] head noted - pt. with cva - ac was restarted but now held due to dropping h/h   -Right thigh hematoma noted on CT scan - appreciate vascular evaluation - no surgical intervention required by vascular at this time - monitor h/h  -Follow up palliative care, pt is DNR  -HD per renal  -Recommend transfer to RCU for continuous pulse ox (d/w medical team, nurse manager)    Wilfred Robin PA-C  Pager: [phone]               Date of Service: 07/28/2021    S: Lethargic, only opens eyes. Unable to obtain ROS.    Review of Systems:   Constitutional: [ ] fevers, [ ] chills.   Skin: [ ] dry skin. [ ] rashes.  Psychiatric: [ ] depression, [ ] anxiety.   Gastrointestinal: [ ] BRBPR, [ ] melena.   Neurological: [ ] confusion. [ ] seizures. [ ] shuffling gait.   Ears,Nose,Mouth and Throat: [ ] ear pain [ ] sore throat.   Eyes: [ ] diplopia.   Respiratory: [ ] hemoptysis. [ ] shortness of breath  Cardiovascular: See HPI above  Hematologic/Lymphatic: [ ] anemia. [ ] painful nodes. [ ] prolonged bleeding.   Genitourinary: [ ] hematuria. [ ] flank pain.   Endocrine: [ ] significant change in weight. [ ] intolerance to heat and cold.     Review of systems [ ] otherwise negative, [x ] otherwise unable to obtain    FH: no family history of sudden cardiac death in first degree relatives    SH: [ ] tobacco, [ ] alcohol, [ ] drugs    acetaminophen   Tablet .. 1000 milliGRAM(s) Oral every 6 hours PRN  atorvastatin 40 milliGRAM(s) Oral at bedtime  calamine/zinc oxide Lotion 1 Application(s) Topical three times a day PRN  cefepime   IVPB 1000 milliGRAM(s) IV Intermittent daily  chlorhexidine 2% Cloths 1 Application(s) Topical daily  dextrose 40% Gel 15 Gram(s) Oral once  dextrose 5%. 1000 milliLiter(s) IV Continuous <Continuous>  dextrose 5%. 1000 milliLiter(s) IV Continuous <Continuous>  dextrose 50% Injectable 25 Gram(s) IV Push once  dextrose 50% Injectable 12.5 Gram(s) IV Push once  dextrose 50% Injectable 25 Gram(s) IV Push once  diltiazem    Tablet 30 milliGRAM(s) Oral four times a day  epoetin danilo-epbx (RETACRIT) Injectable 05185 Unit(s) IV Push <User Schedule>  glucagon  Injectable 1 milliGRAM(s) IntraMuscular once  insulin lispro (ADMELOG) corrective regimen sliding scale   SubCutaneous three times a day before meals  insulin lispro (ADMELOG) corrective regimen sliding scale   SubCutaneous at bedtime  midodrine 10 milliGRAM(s) Oral every 8 hours  pantoprazole    Tablet 40 milliGRAM(s) Oral two times a day  vancomycin  IVPB 750 milliGRAM(s) IV Intermittent <User Schedule>                            8.4    13.56 )-----------( 263      ( 28 Jul 2021 07:33 )             29.3       07-28    139  |  100  |  28<H>  ----------------------------<  149<H>  3.6   |  24  |  4.14<H>    Ca    9.2      28 Jul 2021 07:33  Phos  4.8     07-27  Mg     2.20     07-27    TPro  6.2  /  Alb  2.3<L>  /  TBili  2.0<H>  /  DBili  x   /  AST  67<H>  /  ALT  32  /  AlkPhos  340<H>  07-27            T(C): 37.1 (07-28-21 @ 10:00), Max: 37.1 (07-28-21 @ 10:00)  HR: 88 (07-28-21 @ 09:13) (83 - 100)  BP: 133/64 (07-28-21 @ 09:13) (64/52 - 147/74)  RR: 20 (07-28-21 @ 09:13) (18 - 31)  SpO2: 100% (07-28-21 @ 09:13) (89% - 100%)  Wt(kg): --    I&O's Summary    27 Jul 2021 07:01  -  28 Jul 2021 07:00  --------------------------------------------------------  IN: 756 mL / OUT: 3400 mL / NET: -2644 mL      Gen: Appears well in NAD  HEENT:  (-)icterus (-)pallor  CV: N S1 S2 1/6 DEJAH (+)2 Pulses B/l  Resp:  Clear to auscultation B/L, normal effort  GI: (+) BS Soft, NT, ND  Lymph:  (-)Edema, (-)obvious lymphadenopathy  Skin: Warm to touch, Normal turgor  Psych:  unable to adequately assess mood and affect    DATA  < from: Transthoracic Echocardiogram (07.07.21 @ 18:17) >  ------------------------------------------------------------------------  CONCLUSIONS:  1. Mitral annular calcification, otherwise normal mitral  valve. Minimal mitral regurgitation.  2. Endocardium not well visualized; grossly normal left  ventricular systolic function.  3. Right ventricular enlargement with decreased right  ventricular systolic function.  4. Inadequate tricuspid regurgitation Doppler envelope  precludes estimation of RVSP.  ------------------------------------------------------------------------  Confirmed on  7/7/2021 - 21:16:20 by Osvaldo Bertrand M.D.,  [facility]    < end of copied text >      A/P) 71 y/o male PMH CAD s/p CABG and old PCI, AFL x 1 year on coumadin, HTN, hyperlipidemia, DM, ESRD on HD, and hypothyroidism originally admitted to [facility] with GIB, transferred to [facility], found to have septic shock and MRSA bacteremia, trx to MICU s/p PEA arrest, intubated and now extubated    -Given MRSA bacteremia, TRUMAN performed demonstrating MV endocarditis - discussed with CTS Dr. Pierce - pt. not a surgical candidate at this time   -Abx per ID, blood cx now + for serratia so zosyn added  -[facility] head noted - pt. with cva - ac was restarted but was held due to dropping h/h. H/H remains stable - d/w med team - can resume coumadin with NO bridge  -Right thigh hematoma noted on CT scan - appreciate vascular evaluation - no surgical intervention required by vascular at this time - monitor h/h  -Follow up palliative care, pt is DNR  -HD per renal  -Recommend transfer to RCU for continuous pulse ox (d/w medical team, nurse manager)    Wilfred Robin PA-C  Pager: [phone]

## 2021-07-28 NOTE — PROGRESS NOTE ADULT - SUBJECTIVE AND OBJECTIVE BOX
Medicine Progress Note    Abdias Moreno, MS4  Team 8  #[id number]    Patient is a 70y old  Male who presents with a chief complaint of Chest pain and GIB (27 Jul 2021 17:40)      SUBJECTIVE / OVERNIGHT EVENTS: No acute events overnight. Patient recently transferred from MICU to floors.    MEDICATIONS  (STANDING):  atorvastatin 40 milliGRAM(s) Oral at bedtime  cefepime   IVPB 1000 milliGRAM(s) IV Intermittent daily  chlorhexidine 2% Cloths 1 Application(s) Topical daily  dextrose 40% Gel 15 Gram(s) Oral once  dextrose 5%. 1000 milliLiter(s) (50 mL/Hr) IV Continuous <Continuous>  dextrose 5%. 1000 milliLiter(s) (100 mL/Hr) IV Continuous <Continuous>  dextrose 50% Injectable 25 Gram(s) IV Push once  dextrose 50% Injectable 12.5 Gram(s) IV Push once  dextrose 50% Injectable 25 Gram(s) IV Push once  diltiazem    Tablet 30 milliGRAM(s) Oral four times a day  epoetin danilo-epbx (RETACRIT) Injectable 87267 Unit(s) IV Push <User Schedule>  glucagon  Injectable 1 milliGRAM(s) IntraMuscular once  insulin lispro (ADMELOG) corrective regimen sliding scale   SubCutaneous three times a day before meals  insulin lispro (ADMELOG) corrective regimen sliding scale   SubCutaneous at bedtime  midodrine 20 milliGRAM(s) Oral every 8 hours  pantoprazole    Tablet 40 milliGRAM(s) Oral two times a day  vancomycin  IVPB 750 milliGRAM(s) IV Intermittent <User Schedule>    MEDICATIONS  (PRN):  acetaminophen   Tablet .. 1000 milliGRAM(s) Oral every 6 hours PRN Temp greater or equal to 38C (100.4F), Mild Pain (1 - 3), Moderate Pain (4 - 6)  calamine/zinc oxide Lotion 1 Application(s) Topical three times a day PRN Itching    CAPILLARY BLOOD GLUCOSE      POCT Blood Glucose.: 158 mg/dL (28 Jul 2021 09:09)  POCT Blood Glucose.: 154 mg/dL (28 Jul 2021 06:06)  POCT Blood Glucose.: 206 mg/dL (27 Jul 2021 23:38)  POCT Blood Glucose.: 104 mg/dL (27 Jul 2021 17:11)  POCT Blood Glucose.: 140 mg/dL (27 Jul 2021 12:21)    I&O's Summary    27 Jul 2021 07:01  -  28 Jul 2021 07:00  --------------------------------------------------------  IN: 756 mL / OUT: 3400 mL / NET: -2644 mL        PHYSICAL EXAM:  Vital Signs Last 24 Hrs  T(C): 36.9 (28 Jul 2021 09:13), Max: 36.9 (28 Jul 2021 09:13)  T(F): 98.5 (28 Jul 2021 09:13), Max: 98.5 (28 Jul 2021 09:13)  HR: 88 (28 Jul 2021 09:13) (83 - 100)  BP: 133/64 (28 Jul 2021 09:13) (64/52 - 147/74)  BP(mean): 73 (27 Jul 2021 18:00) (45 - 108)  RR: 20 (28 Jul 2021 09:13) (18 - 31)  SpO2: 100% (28 Jul 2021 09:13) (89% - 100%)  CONSTITUTIONAL: Man laying in bed with eyes closed in no apparent distress  HEENT: Moist oral mucosa, no pharyngeal injection or exudates  RESPIRATORY: Normal respiratory effort; lungs are clear to auscultation bilaterally  CARDIOVASCULAR: Regular rate and rhythm, normal S1 and S2, no murmur/rub/gallop, mild pitting edema appreciated in LLE, not appreciated in RLE, peripheral pulses are 1+ bilaterally  ABDOMEN: Nontender to palpation, normoactive bowel sounds, no guarding; No hepatosplenomegaly  NEUROLOGY: A+O to person. Will turn head when called by name. Does not open his eyes when name is called. Unable to assess CNs and strength    LABS:                        8.4    13.56 )-----------( 263      ( 28 Jul 2021 07:33 )             29.3     07-28    139  |  100  |  28<H>  ----------------------------<  149<H>  3.6   |  24  |  4.14<H>    Ca    9.2      28 Jul 2021 07:33  Phos  4.8     07-27  Mg     2.20     07-27    TPro  6.2  /  Alb  2.3<L>  /  TBili  2.0<H>  /  DBili  x   /  AST  67<H>  /  ALT  32  /  AlkPhos  340<H>  07-27    PTT - ( 27 Jul 2021 03:53 )  PTT:38.8 sec          COVID-19 PCR: NotDetec (04 Jul 2021 11:15)      RADIOLOGY & ADDITIONAL TESTS:  Imaging from Last 24 Hours:    Electrocardiogram/QTc Interval:    COORDINATION OF CARE:  Care Discussed with Consultants/Other Providers:   Medicine Progress Note    Abdias Moreno, MS4  Team 8  #[id number]    Patient is a 70y old  Male who presents with a chief complaint of Chest pain and GIB (27 Jul 2021 17:40)      SUBJECTIVE / OVERNIGHT EVENTS: No acute events overnight. Patient recently transferred from MICU to floors. Patient lethargic and unable to answer questions.    MEDICATIONS  (STANDING):  atorvastatin 40 milliGRAM(s) Oral at bedtime  cefepime   IVPB 1000 milliGRAM(s) IV Intermittent daily  chlorhexidine 2% Cloths 1 Application(s) Topical daily  dextrose 40% Gel 15 Gram(s) Oral once  dextrose 5%. 1000 milliLiter(s) (50 mL/Hr) IV Continuous <Continuous>  dextrose 5%. 1000 milliLiter(s) (100 mL/Hr) IV Continuous <Continuous>  dextrose 50% Injectable 25 Gram(s) IV Push once  dextrose 50% Injectable 12.5 Gram(s) IV Push once  dextrose 50% Injectable 25 Gram(s) IV Push once  diltiazem    Tablet 30 milliGRAM(s) Oral four times a day  epoetin danilo-epbx (RETACRIT) Injectable 97612 Unit(s) IV Push <User Schedule>  glucagon  Injectable 1 milliGRAM(s) IntraMuscular once  insulin lispro (ADMELOG) corrective regimen sliding scale   SubCutaneous three times a day before meals  insulin lispro (ADMELOG) corrective regimen sliding scale   SubCutaneous at bedtime  midodrine 20 milliGRAM(s) Oral every 8 hours  pantoprazole    Tablet 40 milliGRAM(s) Oral two times a day  vancomycin  IVPB 750 milliGRAM(s) IV Intermittent <User Schedule>    MEDICATIONS  (PRN):  acetaminophen   Tablet .. 1000 milliGRAM(s) Oral every 6 hours PRN Temp greater or equal to 38C (100.4F), Mild Pain (1 - 3), Moderate Pain (4 - 6)  calamine/zinc oxide Lotion 1 Application(s) Topical three times a day PRN Itching    CAPILLARY BLOOD GLUCOSE      POCT Blood Glucose.: 158 mg/dL (28 Jul 2021 09:09)  POCT Blood Glucose.: 154 mg/dL (28 Jul 2021 06:06)  POCT Blood Glucose.: 206 mg/dL (27 Jul 2021 23:38)  POCT Blood Glucose.: 104 mg/dL (27 Jul 2021 17:11)  POCT Blood Glucose.: 140 mg/dL (27 Jul 2021 12:21)    I&O's Summary    27 Jul 2021 07:01  -  28 Jul 2021 07:00  --------------------------------------------------------  IN: 756 mL / OUT: 3400 mL / NET: -2644 mL        PHYSICAL EXAM:  Vital Signs Last 24 Hrs  T(C): 36.9 (28 Jul 2021 09:13), Max: 36.9 (28 Jul 2021 09:13)  T(F): 98.5 (28 Jul 2021 09:13), Max: 98.5 (28 Jul 2021 09:13)  HR: 88 (28 Jul 2021 09:13) (83 - 100)  BP: 133/64 (28 Jul 2021 09:13) (64/52 - 147/74)  BP(mean): 73 (27 Jul 2021 18:00) (45 - 108)  RR: 20 (28 Jul 2021 09:13) (18 - 31)  SpO2: 100% (28 Jul 2021 09:13) (89% - 100%)  CONSTITUTIONAL: Lethargic appearing man laying in bed with eyes closed in no apparent distress  HEENT: Moist oral mucosa, no pharyngeal injection or exudates  RESPIRATORY: Decreased respiratory effort; lungs are clear to auscultation bilaterally  CARDIOVASCULAR: Regular rate and rhythm, normal S1 and S2, no murmur/rub/gallop, mild pitting edema appreciated in LLE, not appreciated in RLE, peripheral pulses are 1+ bilaterally  ABDOMEN: Nontender to palpation, normoactive bowel sounds, no guarding; No hepatosplenomegaly  NEUROLOGY: Lethargic. Will turn head when called by name. Does not open his eyes when name is called. Unable to assess CNs and strength    LABS:                        8.4    13.56 )-----------( 263      ( 28 Jul 2021 07:33 )             29.3     07-28    139  |  100  |  28<H>  ----------------------------<  149<H>  3.6   |  24  |  4.14<H>    Ca    9.2      28 Jul 2021 07:33  Phos  4.8     07-27  Mg     2.20     07-27    TPro  6.2  /  Alb  2.3<L>  /  TBili  2.0<H>  /  DBili  x   /  AST  67<H>  /  ALT  32  /  AlkPhos  340<H>  07-27    PTT - ( 27 Jul 2021 03:53 )  PTT:38.8 sec          COVID-19 PCR: NotDetec (04 Jul 2021 11:15)      RADIOLOGY & ADDITIONAL TESTS:  Imaging from Last 24 Hours:    Electrocardiogram/QTc Interval:    COORDINATION OF CARE:  Care Discussed with Consultants/Other Providers:

## 2021-07-28 NOTE — PROGRESS NOTE ADULT - ASSESSMENT
71 yo M with PMhx of CAD s/p CABG (course at that time complicated by pleural effusion requiring chest tubes), ESRD on HD (MWF), DMII, HTN, afib on coumadin, and anemia transferred from [facility] for evaluation of chest pain after HD and GIB s/p 2U PRBC transfusion and aflutter/afib with RVR managed with metoprolol. Admitted to MICU for hypotension dependent on pressors likely 2/2 to sepsis. In MICU, patient had a cardiac arrest on 7/13 requiring CPR, 3x epi, and 2x shock (for VTach). Course now complicated by recent drop in hgb requiring pRBCs. CT of leg shows hematoma on right adductor. Patient is lethargic, suggesting compromised mental status. AMS likely 2/2 R frontal CVA and anoxic brain injury in the setting of cardiac arrest. Patient transferred from MICU to floor.

## 2021-07-28 NOTE — PROGRESS NOTE ADULT - ASSESSMENT
70 year old with ESRD, DM, CAD presented with anemia and chest pain    1) MRSA bacteremia  associated endocarditis  Not a surgical candidate    Continue vancomycin 750 mg on HD days through 8/22/2021    2) Serratia bacteremia  ? focus- Gu vs intrabd vs line related    Serratia is a spice organism - concern re inducable amp c resistance with most beta lactams. But cefepime is usually okay     Continue Cefepime- eventually can change to Cipro    No central line at this time  Check urine Cx  LFTS are elevated- alk phos/ t bili  RUQ sono suggestive CHF  Consider CT a/p to look for focus of serratia bacteremia    3) ESRD  abx  dose adjusted    Case and Plan d/w ICU         70 year old with ESRD, DM, CAD presented with anemia and chest pain    1) MRSA bacteremia  associated endocarditis  Not a surgical candidate    Continue vancomycin 750 mg on HD days through 8/22/2021    2) Serratia bacteremia  ? focus- Gu vs intrabd vs line related    Serratia is a spice organism - concern re inducable amp c resistance with most beta lactams. But cefepime is usually okay     Continue Cefepime- eventually can change to Cipro    No central line at this time  Check urine Cx  LFTS are elevated- alk phos/ t bili  RUQ sono suggestive CHF  Consider CT a/p to look for focus of serratia bacteremia    Repeat blood culture      3) ESRD  abx  dose adjusted    Case and Plan d/w medical tream

## 2021-07-28 NOTE — PROGRESS NOTE ADULT - PROBLEM SELECTOR PLAN 3
Patient found to have hematoma of right leg on CT  - Hg stable at this time  - No concern for bleed at this time  - Transfusion threshold of Hg < 8  - CBC daily

## 2021-07-28 NOTE — PROGRESS NOTE ADULT - SUBJECTIVE AND OBJECTIVE BOX
[facility] - S Eugene / Marlena S /D Cruz/ S Narendra/ WILY Macias/ Alexandre Esposito / WILMER Hermosillou/ O Jessi  service -[phone], office [phone]  ---------------------------------------------------------------------------------------------------------------    Patient seen and examined bedside    Subjective and Objective: No overnight events, sob resolved. No complaints today. feeling better    Allergies: lisinopril (Other)  opioid-like analgesics (Other)      Hospital Medications:   MEDICATIONS  (STANDING):  atorvastatin 40 milliGRAM(s) Oral at bedtime  cefepime   IVPB 1000 milliGRAM(s) IV Intermittent daily  chlorhexidine 2% Cloths 1 Application(s) Topical daily  dextrose 40% Gel 15 Gram(s) Oral once  dextrose 5%. 1000 milliLiter(s) (50 mL/Hr) IV Continuous <Continuous>  dextrose 5%. 1000 milliLiter(s) (100 mL/Hr) IV Continuous <Continuous>  dextrose 50% Injectable 25 Gram(s) IV Push once  dextrose 50% Injectable 12.5 Gram(s) IV Push once  dextrose 50% Injectable 25 Gram(s) IV Push once  diltiazem    Tablet 30 milliGRAM(s) Oral four times a day  epoetin danilo-epbx (RETACRIT) Injectable 76068 Unit(s) IV Push <User Schedule>  glucagon  Injectable 1 milliGRAM(s) IntraMuscular once  insulin lispro (ADMELOG) corrective regimen sliding scale   SubCutaneous three times a day before meals  insulin lispro (ADMELOG) corrective regimen sliding scale   SubCutaneous at bedtime  midodrine 10 milliGRAM(s) Oral every 8 hours  pantoprazole    Tablet 40 milliGRAM(s) Oral two times a day  vancomycin  IVPB 750 milliGRAM(s) IV Intermittent <User Schedule>      REVIEW OF SYSTEMS:  CONSTITUTIONAL: No weakness, fevers or chills  EYES/ENT: No visual changes;  No vertigo or throat pain   NECK: No pain or stiffness  RESPIRATORY: No cough, wheezing, hemoptysis; No shortness of breath  CARDIOVASCULAR: No chest pain or palpitations.  GASTROINTESTINAL: No abdominal or epigastric pain. No nausea, vomiting, or hematemesis; No diarrhea or constipation. No melena or hematochezia.  GENITOURINARY: No dysuria, frequency, foamy urine, urinary urgency, incontinence or hematuria  NEUROLOGICAL: No numbness or weakness  SKIN: No itching, burning, rashes, or lesions   VASCULAR: No bilateral lower extremity edema.   All other review of systems is negative unless indicated above.    VITALS:  T(F): 97.1 (07-28-21 @ 12:38), Max: 98.8 (07-28-21 @ 10:00)  HR: 88 (07-28-21 @ 17:00)  BP: 126/62 (07-28-21 @ 17:00)  RR: 20 (07-28-21 @ 17:00)  SpO2: 98% (07-28-21 @ 17:00)  Wt(kg): --    07-27 @ 07:01  -  07-28 @ 07:00  --------------------------------------------------------  IN: 756 mL / OUT: 3400 mL / NET: -2644 mL          PHYSICAL EXAM:  Constitutional: NAD  HEENT: anicteric sclera, oropharynx clear  Neck: No JVD  Respiratory: CTAB, no wheezes, rales or rhonchi  Cardiovascular: S1, S2, RRR  Gastrointestinal: BS+, soft, NT/ND  Extremities: No cyanosis or clubbing. No peripheral edema  Neurological: A/O x 3, no focal deficits  Psychiatric: Normal mood, normal affect  : No CVA tenderness. No wagner.   Skin: No rashes  Vascular Access:    LABS:  07-28    139  |  100  |  28<H>  ----------------------------<  149<H>  3.6   |  24  |  4.14<H>    Ca    9.2      28 Jul 2021 07:33  Phos  4.8     07-27  Mg     2.20     07-27    TPro  6.2  /  Alb  2.3<L>  /  TBili  2.0<H>  /  DBili      /  AST  67<H>  /  ALT  32  /  AlkPhos  340<H>  07-27    Creatinine Trend: 4.14 <--, 6.08 <--, 5.06 <--, 4.13 <--, 5.33 <--, 4.11 <--, 5.90 <--                        8.4    13.56 )-----------( 263      ( 28 Jul 2021 07:33 )             29.3     Urine Studies:        RADIOLOGY & ADDITIONAL STUDIES:   [facility] - S Eugene / Marlena S /D Cruz/ S Narendra/ WILY Macias/ Alexandre Esposito / WILMER Hermosillou/ O Jessi  service -[phone], office [phone]  ---------------------------------------------------------------------------------------------------------------    Patient seen and examined bedside    Subjective and Objective: No overnight events. lethargic  xfered out of MICU to floors    Allergies: lisinopril (Other)  opioid-like analgesics (Other)      Hospital Medications:   MEDICATIONS  (STANDING):  atorvastatin 40 milliGRAM(s) Oral at bedtime  cefepime   IVPB 1000 milliGRAM(s) IV Intermittent daily  chlorhexidine 2% Cloths 1 Application(s) Topical daily  dextrose 40% Gel 15 Gram(s) Oral once  dextrose 5%. 1000 milliLiter(s) (50 mL/Hr) IV Continuous <Continuous>  dextrose 5%. 1000 milliLiter(s) (100 mL/Hr) IV Continuous <Continuous>  dextrose 50% Injectable 25 Gram(s) IV Push once  dextrose 50% Injectable 12.5 Gram(s) IV Push once  dextrose 50% Injectable 25 Gram(s) IV Push once  diltiazem    Tablet 30 milliGRAM(s) Oral four times a day  epoetin danilo-epbx (RETACRIT) Injectable 92774 Unit(s) IV Push <User Schedule>  glucagon  Injectable 1 milliGRAM(s) IntraMuscular once  insulin lispro (ADMELOG) corrective regimen sliding scale   SubCutaneous three times a day before meals  insulin lispro (ADMELOG) corrective regimen sliding scale   SubCutaneous at bedtime  midodrine 10 milliGRAM(s) Oral every 8 hours  pantoprazole    Tablet 40 milliGRAM(s) Oral two times a day  vancomycin  IVPB 750 milliGRAM(s) IV Intermittent <User Schedule>      VITALS:  T(F): 97.1 (07-28-21 @ 12:38), Max: 98.8 (07-28-21 @ 10:00)  HR: 88 (07-28-21 @ 17:00)  BP: 126/62 (07-28-21 @ 17:00)  RR: 20 (07-28-21 @ 17:00)  SpO2: 98% (07-28-21 @ 17:00)  Wt(kg): --    07-27 @ 07:01  -  07-28 @ 07:00  --------------------------------------------------------  IN: 756 mL / OUT: 3400 mL / NET: -2644 mL      PHYSICAL EXAM:  Constitutional: NAD  HEENT: anicteric sclera  Neck: No JVD  Respiratory: CTAB, no wheezes, rales or rhonchi  Cardiovascular: S1, S2, RRR  Gastrointestinal: BS+, soft, NT  Extremities: 1+ peripheral edema b/l   Neurological: lethargic  : No wagner.   Vascular Access: LUE AV access +thrill and bruit.     LABS:  07-28    139  |  100  |  28<H>  ----------------------------<  149<H>  3.6   |  24  |  4.14<H>    Ca    9.2      28 Jul 2021 07:33  Phos  4.8     07-27  Mg     2.20     07-27    TPro  6.2  /  Alb  2.3<L>  /  TBili  2.0<H>  /  DBili      /  AST  67<H>  /  ALT  32  /  AlkPhos  340<H>  07-27    Creatinine Trend: 4.14 <--, 6.08 <--, 5.06 <--, 4.13 <--, 5.33 <--, 4.11 <--, 5.90 <--                        8.4    13.56 )-----------( 263      ( 28 Jul 2021 07:33 )             29.3     Urine Studies:        RADIOLOGY & ADDITIONAL STUDIES:

## 2021-07-28 NOTE — PROGRESS NOTE ADULT - PROBLEM SELECTOR PLAN 4
Patient with right frontal infarct found on CT, most recent CTH demonstrates no new acute pathology, only evolving R infarct  - Etiology unknown, but potentially in setting of cardiac arrest vs AF vs septic emboli  - Patient being held off on AC at this time per cardio

## 2021-07-28 NOTE — PROGRESS NOTE ADULT - ASSESSMENT
71 yo M with PMhx of CAD s/p CABG (course at that time complicated by pleural effusion requiring chest tubes), ESRD on HD (TTS), DMII, HTN, afib on coumadin, and anemia presented from [facility] for evaluation of chest pain and GIB. Renal following for ESRD Mx. s/p cardiac arrest. CTH showed acute infarcts. Further w/u, MRSA bacteremia, TRUMAN showed MR vegetation.     ESRD  Maintenance schedule TTS  K acceptable  Still edematous, but overall improved volume status.   Had isolated UF 7/26, BP stable during treatment.   s/p HD 7/27, net uf 3kg as tolerated by bp  plan for next HD tomorrow w/2k bath, net uf 2-2.5kg as tolerated by bp  Off levophed. c/w midodrine 10mg tid  On diltiazem for afib rate control.   dose all meds for ESRD  renal diet, fluid restriction  MRSA bacteremia and endocarditis. on vancomycin ivpb post hd, dosed by levels  Anemia in CKD+GIB- Hb <goal  c/w Epo 20k tiw with HD  s/p prbc   CVA- Mx per neuro, team    labs, chart reviewed  [facility]  Cell [phone]  Office [phone]  Ans Serv [phone]

## 2021-07-28 NOTE — PROGRESS NOTE ADULT - ASSESSMENT
Agree with above assessment and plan as outlined above.    - would recommend RCU for pulse ox monitoring     Murphy Colin MD, [facility]  BEEPER [phone]

## 2021-07-28 NOTE — PROGRESS NOTE ADULT - PROBLEM SELECTOR PLAN 5
Sinus rhythm currently  - In setting of NPO status, switch PO diltiazem to IV lopressor 2.5mg for acute rate control  - Cardiology following

## 2021-07-28 NOTE — PROGRESS NOTE ADULT - ATTENDING COMMENTS
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, f/up repeat blood cultures, not a surgical candidate, check CT A/P, c/w IV Vanco and Cefepime, monitor Vanco level, ID following   Septic shock d/t MRSA/serratia bacteremia, shock resolved s/p pressors, wean off Midodrine   Acute embolic stroke, restart Coumadin w/o bridging and monitor for bleeding  Acute blood loss anemia d/t R adductor muscle hematoma w/o active extravasation, s/p 3 units PRBC total, monitor H/H  Acute encephalopathy d/t acute CVA and possible anoxic brain injury post cardiac arrest, MRI brain unlikely to , keep NPO, GOC discussion   Acute hypoxic respiratory failure in setting of cardiac arrest, s/p intubation and now extubated, c/w supplemental O2, continuous pulse oxymetry   Paroxysmal Afib, restart Coumadin w/o bridging and monitor INR, switch PO Diltiazem to Lopressor IV  ESRD, hemodialysis as per renal   DNR/DNI

## 2021-07-28 NOTE — PROGRESS NOTE ADULT - PROBLEM SELECTOR PLAN 1
Patient with blood cultures + for MRSA and Serratia  - C/W Vanc 750mg prior to HD  - C/W Cefepime 1g qd  - F/U still pending cultures  - Will redraw cultures tomorrow to monitor for clearance  - Consider CT A/P for source of serratia

## 2021-07-28 NOTE — PROGRESS NOTE ADULT - SUBJECTIVE AND OBJECTIVE BOX
EP ATTENDING    no tele    lethargic, tachypneic, difficult to arouse, ROS unable to be obtained      DATE OF SERVICE - 07-28-21 @ 10:30    Review of Systems:   Constitutional: [ ] fevers, [ ] chills.   Skin: [ ] dry skin. [ ] rashes.  Psychiatric: [ ] depression, [ ] anxiety.   Gastrointestinal: [ ] BRBPR, [ ] melena.   Neurological: [ ] confusion. [ ] seizures. [ ] shuffling gait.   Ears,Nose,Mouth and Throat: [ ] ear pain [ ] sore throat.   Eyes: [ ] diplopia.   Respiratory: [ ] hemoptysis. [ ] shortness of breath  Cardiovascular: See HPI above  Hematologic/Lymphatic: [ ] anemia. [ ] painful nodes. [ ] prolonged bleeding.   Genitourinary: [ ] hematuria. [ ] flank pain.   Endocrine: [ ] significant change in weight. [ ] intolerance to heat and cold.     Review of systems [ ] otherwise negative, [x ] otherwise unable to obtain    FH: no family history of sudden cardiac death in first degree relatives    SH: [ ] tobacco, [ ] alcohol, [ ] drugs    acetaminophen   Tablet .. 1000 milliGRAM(s) Oral every 6 hours PRN  atorvastatin 40 milliGRAM(s) Oral at bedtime  calamine/zinc oxide Lotion 1 Application(s) Topical three times a day PRN  cefepime   IVPB 1000 milliGRAM(s) IV Intermittent daily  chlorhexidine 2% Cloths 1 Application(s) Topical daily  dextrose 40% Gel 15 Gram(s) Oral once  dextrose 5%. 1000 milliLiter(s) IV Continuous <Continuous>  dextrose 5%. 1000 milliLiter(s) IV Continuous <Continuous>  dextrose 50% Injectable 25 Gram(s) IV Push once  dextrose 50% Injectable 12.5 Gram(s) IV Push once  dextrose 50% Injectable 25 Gram(s) IV Push once  diltiazem    Tablet 30 milliGRAM(s) Oral four times a day  epoetin danilo-epbx (RETACRIT) Injectable 78042 Unit(s) IV Push <User Schedule>  glucagon  Injectable 1 milliGRAM(s) IntraMuscular once  insulin lispro (ADMELOG) corrective regimen sliding scale   SubCutaneous three times a day before meals  insulin lispro (ADMELOG) corrective regimen sliding scale   SubCutaneous at bedtime  midodrine 10 milliGRAM(s) Oral every 8 hours  pantoprazole    Tablet 40 milliGRAM(s) Oral two times a day  vancomycin  IVPB 750 milliGRAM(s) IV Intermittent <User Schedule>                            8.4    13.56 )-----------( 263      ( 28 Jul 2021 07:33 )             29.3       07-28    139  |  100  |  28<H>  ----------------------------<  149<H>  3.6   |  24  |  4.14<H>    Ca    9.2      28 Jul 2021 07:33  Phos  4.8     07-27  Mg     2.20     07-27    TPro  6.2  /  Alb  2.3<L>  /  TBili  2.0<H>  /  DBili  x   /  AST  67<H>  /  ALT  32  /  AlkPhos  340<H>  07-27            T(C): 37.1 (07-28-21 @ 10:00), Max: 37.1 (07-28-21 @ 10:00)  HR: 88 (07-28-21 @ 09:13) (83 - 100)  BP: 133/64 (07-28-21 @ 09:13) (64/52 - 147/74)  RR: 20 (07-28-21 @ 09:13) (18 - 31)  SpO2: 100% (07-28-21 @ 09:13) (89% - 100%)  Wt(kg): --    I&O's Summary    27 Jul 2021 07:01  -  28 Jul 2021 07:00  --------------------------------------------------------  IN: 756 mL / OUT: 3400 mL / NET: -2644 mL        Head: Normocephalic and atraumatic.   Neck: No JVD. No bruits. Supple. Does not appear to be enlarged.   Cardiovascular: + S1,S2 ; RRR Soft systolic murmur at the left lower sternal border. No rubs noted.    Lungs: CTA b/l. No rhonchi, rales or wheezes.   Abdomen: + BS, soft. Non tender. Non distended. No rebound. No guarding.   Extremities: No clubbing/cyanosis/edema.   Skin: Warm and moist. The patient's skin has normal elasticity and good skin turgor.           A/P) 71 y/o male PMH CAD s/p CABG, PAF/AFL on coumadin, HTN, hyperlipidemia, DM, ESRD on HD, hypothyroidism a/w sepsis from mitral valve endocarditis. Had a PEA arrest while in the hospital. Transferred out of MICU yesterday. Today he continues to be lethargic and difficult to arouse.     -recommend lifelong a/c for PAF  -s/p PEA/jay/asystole arrest, secondary to critical illness and infection.  No pacemaker or ICD indicated at this time  -Endocarditis to be medically managed for now, refused by CTS for invasive management  -no further inpatient EP workup expected but will follow  -palliative follow up  -would consider transfer to RCU for continuos pulse ox monitoring and airway protection  -d/w medical attending  -d/w floor nurse manager  -f/u cardiology   EP ATTENDING    no tele    lethargic, tachypneic, difficult to arouse, ROS unable to be obtained      DATE OF SERVICE - 07-28-21     Review of Systems:   Constitutional: [ ] fevers, [ ] chills.   Skin: [ ] dry skin. [ ] rashes.  Psychiatric: [ ] depression, [ ] anxiety.   Gastrointestinal: [ ] BRBPR, [ ] melena.   Neurological: [ ] confusion. [ ] seizures. [ ] shuffling gait.   Ears,Nose,Mouth and Throat: [ ] ear pain [ ] sore throat.   Eyes: [ ] diplopia.   Respiratory: [ ] hemoptysis. [ ] shortness of breath  Cardiovascular: See HPI above  Hematologic/Lymphatic: [ ] anemia. [ ] painful nodes. [ ] prolonged bleeding.   Genitourinary: [ ] hematuria. [ ] flank pain.   Endocrine: [ ] significant change in weight. [ ] intolerance to heat and cold.     Review of systems [ ] otherwise negative, [x ] otherwise unable to obtain    FH: no family history of sudden cardiac death in first degree relatives    SH: [ ] tobacco, [ ] alcohol, [ ] drugs    acetaminophen   Tablet .. 1000 milliGRAM(s) Oral every 6 hours PRN  atorvastatin 40 milliGRAM(s) Oral at bedtime  calamine/zinc oxide Lotion 1 Application(s) Topical three times a day PRN  cefepime   IVPB 1000 milliGRAM(s) IV Intermittent daily  chlorhexidine 2% Cloths 1 Application(s) Topical daily  dextrose 40% Gel 15 Gram(s) Oral once  dextrose 5%. 1000 milliLiter(s) IV Continuous <Continuous>  dextrose 5%. 1000 milliLiter(s) IV Continuous <Continuous>  dextrose 50% Injectable 25 Gram(s) IV Push once  dextrose 50% Injectable 12.5 Gram(s) IV Push once  dextrose 50% Injectable 25 Gram(s) IV Push once  diltiazem    Tablet 30 milliGRAM(s) Oral four times a day  epoetin danilo-epbx (RETACRIT) Injectable 63905 Unit(s) IV Push <User Schedule>  glucagon  Injectable 1 milliGRAM(s) IntraMuscular once  insulin lispro (ADMELOG) corrective regimen sliding scale   SubCutaneous three times a day before meals  insulin lispro (ADMELOG) corrective regimen sliding scale   SubCutaneous at bedtime  midodrine 10 milliGRAM(s) Oral every 8 hours  pantoprazole    Tablet 40 milliGRAM(s) Oral two times a day  vancomycin  IVPB 750 milliGRAM(s) IV Intermittent <User Schedule>                            8.4    13.56 )-----------( 263      ( 28 Jul 2021 07:33 )             29.3       07-28    139  |  100  |  28<H>  ----------------------------<  149<H>  3.6   |  24  |  4.14<H>    Ca    9.2      28 Jul 2021 07:33  Phos  4.8     07-27  Mg     2.20     07-27    TPro  6.2  /  Alb  2.3<L>  /  TBili  2.0<H>  /  DBili  x   /  AST  67<H>  /  ALT  32  /  AlkPhos  340<H>  07-27            T(C): 37.1 (07-28-21 @ 10:00), Max: 37.1 (07-28-21 @ 10:00)  HR: 88 (07-28-21 @ 09:13) (83 - 100)  BP: 133/64 (07-28-21 @ 09:13) (64/52 - 147/74)  RR: 20 (07-28-21 @ 09:13) (18 - 31)  SpO2: 100% (07-28-21 @ 09:13) (89% - 100%)  Wt(kg): --    I&O's Summary    27 Jul 2021 07:01  -  28 Jul 2021 07:00  --------------------------------------------------------  IN: 756 mL / OUT: 3400 mL / NET: -2644 mL        Head: Normocephalic and atraumatic.   Neck: No JVD. No bruits. Supple. Does not appear to be enlarged.   Cardiovascular: + S1,S2 ; RRR Soft systolic murmur at the left lower sternal border. No rubs noted.    Lungs: CTA b/l. No rhonchi, rales or wheezes.   Abdomen: + BS, soft. Non tender. Non distended. No rebound. No guarding.   Extremities: No clubbing/cyanosis/edema.   Skin: Warm and moist. The patient's skin has normal elasticity and good skin turgor.           A/P) 69 y/o male PMH CAD s/p CABG, PAF/AFL on coumadin, HTN, hyperlipidemia, DM, ESRD on HD, hypothyroidism a/w sepsis from mitral valve endocarditis. Had a PEA arrest while in the hospital. Transferred out of MICU yesterday. Today he continues to be lethargic and difficult to arouse.     -recommend lifelong a/c for PAF  -s/p PEA/jay/asystole arrest, secondary to critical illness and infection.  No pacemaker or ICD indicated at this time  -Endocarditis to be medically managed for now, refused by CTS for invasive management  -no further inpatient EP workup expected but will follow  -palliative follow up  -would consider transfer to RCU for continous pulse ox monitoring and airway protection  -d/w medical attending  -d/w floor nurse manager  -f/u cardiology

## 2021-07-28 NOTE — PROGRESS NOTE ADULT - PROBLEM SELECTOR PLAN 2
TRUMAN significant for vegetation on posterior leaflet of mitral valve  - Antibiotic therapy as above  - No AC at this time

## 2021-07-29 LAB
ALBUMIN SERPL ELPH-MCNC: 2.2 G/DL — LOW (ref 3.3–5)
ALP SERPL-CCNC: 454 U/L — HIGH (ref 40–120)
ALT FLD-CCNC: 35 U/L — SIGNIFICANT CHANGE UP (ref 4–41)
ANION GAP SERPL CALC-SCNC: 18 MMOL/L — HIGH (ref 7–14)
AST SERPL-CCNC: 58 U/L — HIGH (ref 4–40)
BILIRUB SERPL-MCNC: 2.3 MG/DL — HIGH (ref 0.2–1.2)
BUN SERPL-MCNC: 39 MG/DL — HIGH (ref 7–23)
CALCIUM SERPL-MCNC: 9.4 MG/DL — SIGNIFICANT CHANGE UP (ref 8.4–10.5)
CHLORIDE SERPL-SCNC: 99 MMOL/L — SIGNIFICANT CHANGE UP (ref 98–107)
CO2 SERPL-SCNC: 24 MMOL/L — SIGNIFICANT CHANGE UP (ref 22–31)
CREAT SERPL-MCNC: 5.39 MG/DL — HIGH (ref 0.5–1.3)
CULTURE RESULTS: SIGNIFICANT CHANGE UP
GLUCOSE BLDC GLUCOMTR-MCNC: 107 MG/DL — HIGH (ref 70–99)
GLUCOSE BLDC GLUCOMTR-MCNC: 112 MG/DL — HIGH (ref 70–99)
GLUCOSE BLDC GLUCOMTR-MCNC: 115 MG/DL — HIGH (ref 70–99)
GLUCOSE BLDC GLUCOMTR-MCNC: 126 MG/DL — HIGH (ref 70–99)
GLUCOSE BLDC GLUCOMTR-MCNC: 148 MG/DL — HIGH (ref 70–99)
GLUCOSE SERPL-MCNC: 124 MG/DL — HIGH (ref 70–99)
HCT VFR BLD CALC: 31.9 % — LOW (ref 39–50)
HGB BLD-MCNC: 9.2 G/DL — LOW (ref 13–17)
MAGNESIUM SERPL-MCNC: 2.1 MG/DL — SIGNIFICANT CHANGE UP (ref 1.6–2.6)
MCHC RBC-ENTMCNC: 26.6 PG — LOW (ref 27–34)
MCHC RBC-ENTMCNC: 28.8 GM/DL — LOW (ref 32–36)
MCV RBC AUTO: 92.2 FL — SIGNIFICANT CHANGE UP (ref 80–100)
NRBC # BLD: 3 /100 WBCS — SIGNIFICANT CHANGE UP
NRBC # FLD: 0.33 K/UL — HIGH
PHOSPHATE SERPL-MCNC: 4.5 MG/DL — SIGNIFICANT CHANGE UP (ref 2.5–4.5)
PLATELET # BLD AUTO: 259 K/UL — SIGNIFICANT CHANGE UP (ref 150–400)
POTASSIUM SERPL-MCNC: 3.9 MMOL/L — SIGNIFICANT CHANGE UP (ref 3.5–5.3)
POTASSIUM SERPL-SCNC: 3.9 MMOL/L — SIGNIFICANT CHANGE UP (ref 3.5–5.3)
PROT SERPL-MCNC: 6.2 G/DL — SIGNIFICANT CHANGE UP (ref 6–8.3)
RBC # BLD: 3.46 M/UL — LOW (ref 4.2–5.8)
RBC # FLD: 19.9 % — HIGH (ref 10.3–14.5)
SODIUM SERPL-SCNC: 141 MMOL/L — SIGNIFICANT CHANGE UP (ref 135–145)
SPECIMEN SOURCE: SIGNIFICANT CHANGE UP
VANCOMYCIN FLD-MCNC: 13.5 UG/ML — SIGNIFICANT CHANGE UP
WBC # BLD: 11.57 K/UL — HIGH (ref 3.8–10.5)
WBC # FLD AUTO: 11.57 K/UL — HIGH (ref 3.8–10.5)

## 2021-07-29 PROCEDURE — 99233 SBSQ HOSP IP/OBS HIGH 50: CPT | Mod: GC

## 2021-07-29 PROCEDURE — 99232 SBSQ HOSP IP/OBS MODERATE 35: CPT

## 2021-07-29 RX ORDER — DEXTROSE 50 % IN WATER 50 %
25 SYRINGE (ML) INTRAVENOUS ONCE
Refills: 0 | Status: DISCONTINUED | OUTPATIENT
Start: 2021-07-29 | End: 2021-07-30

## 2021-07-29 RX ORDER — DEXTROSE 50 % IN WATER 50 %
12.5 SYRINGE (ML) INTRAVENOUS ONCE
Refills: 0 | Status: DISCONTINUED | OUTPATIENT
Start: 2021-07-29 | End: 2021-07-30

## 2021-07-29 RX ORDER — METOPROLOL TARTRATE 50 MG
5 TABLET ORAL EVERY 6 HOURS
Refills: 0 | Status: DISCONTINUED | OUTPATIENT
Start: 2021-07-29 | End: 2021-08-09

## 2021-07-29 RX ORDER — INSULIN LISPRO 100/ML
VIAL (ML) SUBCUTANEOUS EVERY 6 HOURS
Refills: 0 | Status: DISCONTINUED | OUTPATIENT
Start: 2021-07-29 | End: 2021-07-30

## 2021-07-29 RX ORDER — SODIUM CHLORIDE 9 MG/ML
1000 INJECTION, SOLUTION INTRAVENOUS
Refills: 0 | Status: DISCONTINUED | OUTPATIENT
Start: 2021-07-29 | End: 2021-07-30

## 2021-07-29 RX ORDER — GLUCAGON INJECTION, SOLUTION 0.5 MG/.1ML
1 INJECTION, SOLUTION SUBCUTANEOUS ONCE
Refills: 0 | Status: DISCONTINUED | OUTPATIENT
Start: 2021-07-29 | End: 2021-07-30

## 2021-07-29 RX ORDER — DEXTROSE 50 % IN WATER 50 %
15 SYRINGE (ML) INTRAVENOUS ONCE
Refills: 0 | Status: DISCONTINUED | OUTPATIENT
Start: 2021-07-29 | End: 2021-07-30

## 2021-07-29 RX ADMIN — CHLORHEXIDINE GLUCONATE 1 APPLICATION(S): 213 SOLUTION TOPICAL at 17:46

## 2021-07-29 RX ADMIN — Medication 250 MILLIGRAM(S): at 19:29

## 2021-07-29 RX ADMIN — ERYTHROPOIETIN 20000 UNIT(S): 10000 INJECTION, SOLUTION INTRAVENOUS; SUBCUTANEOUS at 09:28

## 2021-07-29 RX ADMIN — CEFEPIME 100 MILLIGRAM(S): 1 INJECTION, POWDER, FOR SOLUTION INTRAMUSCULAR; INTRAVENOUS at 13:57

## 2021-07-29 NOTE — MEDICAL STUDENT PROGRESS NOTE(EDUCATION) - NS MD HP STUD ASPLAN PLAN FT
Bacteremia/endocarditis:  ID on board  Continue vanc (dose with HD) and cefepime 1g/day for MRSA and serratia coverage    CVA    ESRD:  HD TuThSa    Hypotension  Midodrine 2mg TID    Afib  continue Diltiazem  Hold AC per cardio    DM2  SSI    Palliative  Contnue GOC with family    Anemia from ckd  continue EPO   Bacteremia/endocarditis:  ID on board  Continue vanc (dose with HD) and cefepime 1g/day for MRSA and serratia coverage    CVA  NPO    ESRD:  HD TuThSa    Hypotension  Midodrine 2mg TID    Afib  continue Diltiazem  Hold AC per cardio    DM2  SSI    Palliative  Continue dilaudid  Contnue GOC with family    Anemia from ckd  continue EPO Bacteremia/endocarditis:  ID on board  Continue vanc (dose with HD) and cefepime 1g/day for MRSA and serratia coverage  CT abdomen pelvis to be done today for source    CVA  Frontal lobe infarct  NPO    ESRD:  HD TuThSa    Hypotension  Midodrine 2mg TID    Afib  continue Diltiazem  Hold AC per cardio    DM2  SSI    Palliative  Continue dilaudid  Contnue GOC with family    Anemia from ckd  continue EPO

## 2021-07-29 NOTE — CHART NOTE - NSCHARTNOTEFT_GEN_A_CORE
Pt remains on HD, not a candidate for hospice at this time unless unable to tolerate.  Will follow next week to assess further symptoms or eligibility.  If symptoms change, please call [phone]. Wendi Salcedo DO

## 2021-07-29 NOTE — PROGRESS NOTE ADULT - ASSESSMENT
70 year old with ESRD, DM, CAD presented with anemia and chest pain    1) MRSA bacteremia  associated endocarditis  Not a surgical candidate    Continue vancomycin 750 mg on HD days through 8/22/2021    2) Serratia bacteremia  ? focus- Gu vs intrabd vs line related    Serratia is a spice organism - concern re inducable amp c resistance with most beta lactams. But cefepime is usually okay     Continue Cefepime- eventually can change to Cipro    No central line at this time  Check urine Cx  LFTS are elevated- alk phos/ t bili  RUQ sono suggestive CHF  Consider CT a/p to look for focus of serratia bacteremia    Repeat blood culture      3) ESRD  abx  dose adjusted    I will be away 7/30 through 8/8. ID service to follow  Call [phone]

## 2021-07-29 NOTE — PROGRESS NOTE ADULT - SUBJECTIVE AND OBJECTIVE BOX
Date of Service: 07/29/2021    S: Seen at , more awake today, able to tell me his name but would not answer other questions appropriately therefore unable to obtain ROS.    Review of Systems:   Constitutional: [ ] fevers, [ ] chills.   Skin: [ ] dry skin. [ ] rashes.  Psychiatric: [ ] depression, [ ] anxiety.   Gastrointestinal: [ ] BRBPR, [ ] melena.   Neurological: [ ] confusion. [ ] seizures. [ ] shuffling gait.   Ears,Nose,Mouth and Throat: [ ] ear pain [ ] sore throat.   Eyes: [ ] diplopia.   Respiratory: [ ] hemoptysis. [ ] shortness of breath  Cardiovascular: See HPI above  Hematologic/Lymphatic: [ ] anemia. [ ] painful nodes. [ ] prolonged bleeding.   Genitourinary: [ ] hematuria. [ ] flank pain.   Endocrine: [ ] significant change in weight. [ ] intolerance to heat and cold.     Review of systems [ ] otherwise negative, [x ] otherwise unable to obtain    FH: no family history of sudden cardiac death in first degree relatives    SH: [ ] tobacco, [ ] alcohol, [ ] drugs      MEDICATIONS  (STANDING):  atorvastatin 40 milliGRAM(s) Oral at bedtime  cefepime   IVPB 1000 milliGRAM(s) IV Intermittent daily  chlorhexidine 2% Cloths 1 Application(s) Topical daily  dextrose 40% Gel 15 Gram(s) Oral once  dextrose 5%. 1000 milliLiter(s) (50 mL/Hr) IV Continuous <Continuous>  dextrose 5%. 1000 milliLiter(s) (100 mL/Hr) IV Continuous <Continuous>  dextrose 50% Injectable 12.5 Gram(s) IV Push once  dextrose 50% Injectable 25 Gram(s) IV Push once  dextrose 50% Injectable 25 Gram(s) IV Push once  diltiazem    Tablet 30 milliGRAM(s) Oral four times a day  epoetin danilo-epbx (RETACRIT) Injectable 21890 Unit(s) IV Push <User Schedule>  glucagon  Injectable 1 milliGRAM(s) IntraMuscular once  insulin lispro (ADMELOG) corrective regimen sliding scale   SubCutaneous every 6 hours  pantoprazole    Tablet 40 milliGRAM(s) Oral two times a day  vancomycin  IVPB 750 milliGRAM(s) IV Intermittent <User Schedule>    MEDICATIONS  (PRN):  acetaminophen   Tablet .. 1000 milliGRAM(s) Oral every 6 hours PRN Temp greater or equal to 38C (100.4F), Mild Pain (1 - 3), Moderate Pain (4 - 6)  calamine/zinc oxide Lotion 1 Application(s) Topical three times a day PRN Itching  metoprolol tartrate Injectable 5 milliGRAM(s) IV Push every 6 hours PRN HR>100 and/or SBP>140      LABS:                          9.2    11.57 )-----------( 259      ( 29 Jul 2021 07:09 )             31.9     Hemoglobin: 9.2 g/dL (07-29 @ 07:09)  Hemoglobin: 8.4 g/dL (07-28 @ 07:33)  Hemoglobin: 8.6 g/dL (07-27 @ 03:53)  Hemoglobin: 8.0 g/dL (07-26 @ 04:25)  Hemoglobin: 7.2 g/dL (07-25 @ 04:09)    07-29    141  |  99  |  39<H>  ----------------------------<  124<H>  3.9   |  24  |  5.39<H>    Ca    9.4      29 Jul 2021 07:09  Phos  4.5     07-29  Mg     2.10     07-29    TPro  6.2  /  Alb  2.2<L>  /  TBili  2.3<H>  /  DBili  x   /  AST  58<H>  /  ALT  35  /  AlkPhos  454<H>  07-29    Creatinine Trend: 5.39<--, 4.14<--, 6.08<--, 5.06<--, 4.13<--, 5.33<--             07-28-21 @ 07:01  -  07-29-21 @ 07:00  --------------------------------------------------------  IN: 50 mL / OUT: 0 mL / NET: 50 mL        PHYSICAL EXAM  Vital Signs Last 24 Hrs  T(C): 36.4 (29 Jul 2021 08:30), Max: 36.6 (28 Jul 2021 21:20)  T(F): 97.5 (29 Jul 2021 08:30), Max: 97.9 (28 Jul 2021 21:20)  HR: 76 (29 Jul 2021 08:30) (76 - 92)  BP: 185/76 (29 Jul 2021 08:30) (119/54 - 185/76)  BP(mean): --  RR: 19 (29 Jul 2021 08:30) (18 - 22)  SpO2: 100% (29 Jul 2021 05:00) (98% - 100%)        Gen: NAD  HEENT:  (-)icterus (-)pallor  CV: N S1 S2 1/6 DEJAH (+)2 Pulses B/l  Resp:  Clear to auscultation B/L, normal effort  GI: (+) BS Soft, NT, ND  Lymph:  (-)Edema, (-)obvious lymphadenopathy  Skin: Warm to touch, Normal turgor  Psych:  unable to adequately assess mood and affect    DATA  < from: Transthoracic Echocardiogram (07.07.21 @ 18:17) >  ------------------------------------------------------------------------  CONCLUSIONS:  1. Mitral annular calcification, otherwise normal mitral  valve. Minimal mitral regurgitation.  2. Endocardium not well visualized; grossly normal left  ventricular systolic function.  3. Right ventricular enlargement with decreased right  ventricular systolic function.  4. Inadequate tricuspid regurgitation Doppler envelope  precludes estimation of RVSP.  ------------------------------------------------------------------------  Confirmed on  7/7/2021 - 21:16:20 by Osvaldo Bertrand M.D.,  [facility], JOHN    < end of copied text >      A/P) 71 y/o male PMH CAD s/p CABG and old PCI, AFL x 1 year on coumadin, HTN, hyperlipidemia, DM, ESRD on HD, and hypothyroidism originally admitted to [facility] with GIB, transferred to [facility], found to have septic shock and MRSA bacteremia, trx to MICU s/p PEA arrest, intubated and now extubated    -Given MRSA bacteremia, TRUMAN performed demonstrating MV endocarditis - discussed with CTS Dr. Pierce - pt. not a surgical candidate at this time   -Abx per ID, blood cx now + for serratia, pending CT A/P  -CTH head noted - pt. with cva  -Right thigh hematoma noted on CT scan - appreciate vascular evaluation - no surgical intervention required by vascular at this time - monitor h/h  -H/H remains stable - can resume coumadin with NO bridge when tolerating PO  -Follow up palliative care, pt is DNR  -HD per renal - monitor BP, now off midodrine due to HTN  -No further inpatient cardiac w/u expected    Wilfred Robin PA-C  Pager: [phone]

## 2021-07-29 NOTE — PROGRESS NOTE ADULT - SUBJECTIVE AND OBJECTIVE BOX
Nephrology Followup Note - [phone] - Dr Mendiola / Dr Knight / Dr Macias / Dr Arroyo / Dr Mackey / Dr Bowen / Dr Esposito / Dr Calle  Pt seen and examined at bedside  No acute events overnight. Pt comfortable.     Allergies:  lisinopril (Other)  opioid-like analgesics (Other)    Hospital Medications:   MEDICATIONS  (STANDING):  atorvastatin 40 milliGRAM(s) Oral at bedtime  cefepime   IVPB 1000 milliGRAM(s) IV Intermittent daily  chlorhexidine 2% Cloths 1 Application(s) Topical daily  dextrose 40% Gel 15 Gram(s) Oral once  dextrose 5%. 1000 milliLiter(s) (50 mL/Hr) IV Continuous <Continuous>  dextrose 5%. 1000 milliLiter(s) (100 mL/Hr) IV Continuous <Continuous>  dextrose 50% Injectable 12.5 Gram(s) IV Push once  dextrose 50% Injectable 25 Gram(s) IV Push once  dextrose 50% Injectable 25 Gram(s) IV Push once  epoetin danilo-epbx (RETACRIT) Injectable 98849 Unit(s) IV Push <User Schedule>  glucagon  Injectable 1 milliGRAM(s) IntraMuscular once  insulin lispro (ADMELOG) corrective regimen sliding scale   SubCutaneous every 6 hours  pantoprazole    Tablet 40 milliGRAM(s) Oral two times a day  vancomycin  IVPB 750 milliGRAM(s) IV Intermittent <User Schedule>    VITALS:  T(F): 97.7 (07-29-21 @ 12:10), Max: 97.9 (07-28-21 @ 21:20)  HR: 88 (07-29-21 @ 12:10)  BP: 155/56 (07-29-21 @ 12:10)  RR: 19 (07-29-21 @ 12:10)  SpO2: 100% (07-29-21 @ 05:00)  Wt(kg): --    07-28 @ 07:01  -  07-29 @ 07:00  --------------------------------------------------------  IN: 50 mL / OUT: 0 mL / NET: 50 mL    07-29 @ 07:01 - 07-29 @ 13:34  --------------------------------------------------------  IN: 700 mL / OUT: 1747 mL / NET: -1047 mL        PHYSICAL EXAM:  Constitutional: NAD  HEENT: anicteric sclera, oropharynx clear, MMM  Neck: No JVD  Respiratory: CTAB, no wheezes, rales or rhonchi  Cardiovascular: S1, S2, RRR  Gastrointestinal: BS+, soft, NT/ND  Extremities: No cyanosis or clubbing. trace peripheral edema  Neurological: A/O x 0  : No CVA tenderness. No wagner.   Skin: No rashes  Vascular Access: LUE AV access +thrill and bruit.     LABS:  07-29    141  |  99  |  39<H>  ----------------------------<  124<H>  3.9   |  24  |  5.39<H>    Ca    9.4      29 Jul 2021 07:09  Phos  4.5     07-29  Mg     2.10     07-29    TPro  6.2  /  Alb  2.2<L>  /  TBili  2.3<H>  /  DBili      /  AST  58<H>  /  ALT  35  /  AlkPhos  454<H>  07-29    Creatinine Trend: 5.39 <--, 4.14 <--, 6.08 <--, 5.06 <--, 4.13 <--, 5.33 <--, 4.11 <--                        9.2    11.57 )-----------( 259      ( 29 Jul 2021 07:09 )             31.9     Urine Studies:      RADIOLOGY & ADDITIONAL STUDIES:

## 2021-07-29 NOTE — PROGRESS NOTE ADULT - PROBLEM SELECTOR PLAN 9
- DNR/DNI  - Palliative following - DNR/DNI  - Palliative following  - Will talk to family regarding feeding status of patient, and whether ST vs LT. If decision made for tube feeds, will restart oral medications - DNR/DNI  - Palliative following  - Speech and swallow consult  - Will talk to family regarding feeding status of patient, following S&W eval whether ST vs LT is best. If decision made for tube feeds, will restart oral medications

## 2021-07-29 NOTE — PROGRESS NOTE ADULT - ASSESSMENT
69 yo M with PMhx of CAD s/p CABG (course at that time complicated by pleural effusion requiring chest tubes), ESRD on HD (MWF), DMII, HTN, afib on coumadin, and anemia transferred from [facility] for evaluation of chest pain after HD and GIB s/p 2U PRBC transfusion and aflutter/afib with RVR managed with metoprolol. Admitted to MICU for hypotension dependent on pressors likely 2/2 to sepsis. In MICU, patient had a cardiac arrest on 7/13 requiring CPR, 3x epi, and 2x shock (for VTach). Course complicated by recent drop in hgb requiring pRBCs. CT of leg shows hematoma on right adductor. Patient lethargic, suggesting compromised mental status. AMS likely 2/2 R frontal CVA and anoxic brain injury in the setting of cardiac arrest. Patient transferred from MICU to floor. Blood cultures now growing Serratia.

## 2021-07-29 NOTE — PROGRESS NOTE ADULT - PROBLEM SELECTOR PLAN 4
Patient with right frontal infarct found on CT, most recent CTH demonstrates no new acute pathology, only evolving R infarct  - Etiology unknown, but potentially in setting of cardiac arrest vs AF vs septic emboli  - Patient being held off on AC at this time per cardio  - Patient moved to 5th floor for continuous pulse oximetry

## 2021-07-29 NOTE — PROGRESS NOTE ADULT - SUBJECTIVE AND OBJECTIVE BOX
Follow Up:      Invterval History/ROS: Patient is a 70y old  Male who presents with a chief complaint of Chest pain and GIB (28 Jul 2021 18:17)    No fever    Allergies    lisinopril (Other)  opioid-like analgesics (Other)    Intolerances        ANTIMICROBIALS:  cefepime   IVPB 1000 daily  vancomycin  IVPB 750 <User Schedule>      OTHER MEDS:  acetaminophen   Tablet .. 1000 milliGRAM(s) Oral every 6 hours PRN  atorvastatin 40 milliGRAM(s) Oral at bedtime  calamine/zinc oxide Lotion 1 Application(s) Topical three times a day PRN  chlorhexidine 2% Cloths 1 Application(s) Topical daily  dextrose 40% Gel 15 Gram(s) Oral once  dextrose 5%. 1000 milliLiter(s) IV Continuous <Continuous>  dextrose 5%. 1000 milliLiter(s) IV Continuous <Continuous>  dextrose 50% Injectable 12.5 Gram(s) IV Push once  dextrose 50% Injectable 25 Gram(s) IV Push once  dextrose 50% Injectable 25 Gram(s) IV Push once  diltiazem    Tablet 30 milliGRAM(s) Oral four times a day  epoetin danilo-epbx (RETACRIT) Injectable 93399 Unit(s) IV Push <User Schedule>  glucagon  Injectable 1 milliGRAM(s) IntraMuscular once  insulin lispro (ADMELOG) corrective regimen sliding scale   SubCutaneous every 6 hours  metoprolol tartrate Injectable 5 milliGRAM(s) IV Push every 6 hours PRN  midodrine 10 milliGRAM(s) Oral every 8 hours  pantoprazole    Tablet 40 milliGRAM(s) Oral two times a day      Vital Signs Last 24 Hrs  T(C): 36.4 (29 Jul 2021 08:30), Max: 37.1 (28 Jul 2021 10:00)  T(F): 97.5 (29 Jul 2021 08:30), Max: 98.8 (28 Jul 2021 10:00)  HR: 76 (29 Jul 2021 08:30) (76 - 92)  BP: 185/76 (29 Jul 2021 08:30) (119/54 - 185/76)  BP(mean): --  RR: 19 (29 Jul 2021 08:30) (18 - 22)  SpO2: 100% (29 Jul 2021 05:00) (98% - 100%)    PHYSICAL EXAM:  General: [x ] non-toxic  HEAD/EYES: [ ] PERRL [x ] white sclera [ ] icterus  ENT:  [ ] normal x[ ] supple [ ] thrush [ ] pharyngeal exudate  Cardiovascular:   [ ] murmur [x ] normal [ ] PPM/AICD  Respiratory:  [x ] clear to ausculation bilaterally  GI:  [x ] soft, non-tender, normal bowel sounds  :  [ ] wagner [ ] no CVA tenderness   Musculoskeletal:  [x ] no synovitis  Neurologic:  [ ] non-focal exam   Skin:  [x ] no rash  Lymph: [ x] no lymphadenopathy  Psychiatric:  [ ] appropriate affect [ ] alert & oriented  Lines:  [x ] no phlebitis [ ] central line                                9.2    11.57 )-----------( 259      ( 29 Jul 2021 07:09 )             31.9       07-29    141  |  99  |  39<H>  ----------------------------<  124<H>  3.9   |  24  |  5.39<H>    Ca    9.4      29 Jul 2021 07:09  Phos  4.5     07-29  Mg     2.10     07-29    TPro  6.2  /  Alb  2.2<L>  /  TBili  2.3<H>  /  DBili  x   /  AST  58<H>  /  ALT  35  /  AlkPhos  454<H>  07-29          MICROBIOLOGY:Culture Results:   No growth to date. (07-24-21 @ 18:47)  Culture Results:   Growth in aerobic and anaerobic bottles: Serratia marcescens  See previous culture [id number] (07-24-21 @ 14:28)  Culture Results:   Growth in aerobic bottle: Serratia marcescens  Growth in anaerobic bottle: Gram Negative Rods  ***Blood Panel PCR results on this specimen are available  approximately 3 hours after the Gram stain result.***  Gram stain, PCR, and/or culture resultsmay not always  correspond due to difference in methodologies.  ************************************************************  This PCR assay was performed by multiplex PCR. This  Assay tests for 66 bacterial and resistance gene targets.  Please referto the [facility] test directory  at [email or web address] for details. (07-22-21 @ 22:57)      RADIOLOGY:

## 2021-07-29 NOTE — PROGRESS NOTE ADULT - PROBLEM SELECTOR PLAN 1
Patient with blood cultures + for MRSA and Serratia  - C/W Vanc 750mg prior to HD  - C/W Cefepime 1g qd  - Cultures scheduled for today to monitor for clearance  - F/U CT A/P for possible source of serratia Patient with blood cultures + for MRSA and Serratia  - C/W Vanc 750mg prior to HD  - C/W Cefepime 1g qd  - Cultures scheduled for today to monitor for clearance  - F/U CT A/P and chest for possible source of serratia

## 2021-07-29 NOTE — SWALLOW BEDSIDE ASSESSMENT ADULT - COMMENTS
As per Internal Medicine Note: 71 yo M with PMhx of CAD s/p CABG (course at that time complicated by pleural effusion requiring chest tubes), ESRD on HD (MWF), DMII, HTN, afib on coumadin, and anemia transferred from [facility] for evaluation of chest pain after HD and GIB s/p 2U PRBC transfusion and aflutter/afib with RVR managed with metoprolol. Admitted to MICU for hypotension dependent on pressors likely 2/2 to sepsis. In MICU, patient had a cardiac arrest on 7/13 requiring CPR, 3x epi, and 2x shock (for VTach). Course complicated by recent drop in hgb requiring pRBCs. CT of leg shows hematoma on right adductor. Patient lethargic, suggesting compromised mental status. AMS likely 2/2 R frontal CVA and anoxic brain injury in the setting of cardiac arrest. Patient transferred from MICU to floor. Blood cultures now growing Serratia.    HCT: -No sizable new infarct identified. No acute intracranial hemorrhage. -Ongoing evolution of subacute right frontal lobe infarct.  -Multiple chronic infarcts as described.    Patient seen by this service on 7/25/2021 for clinical swallow evaluation, recommendation of puree and honey thick liquids, please refer to report for full details.      SLP received patient sitting upright in bed, arousable to an awake and alert state, verbalizing but does not make sense.  Patient’s wife and niece are at bedside, niece fed patient during the evaluation.  No physical indicators of pain present.
Name band;
Cardiology note 7/24/2021: 71 y/o male PMH CAD s/p CABG and old PCI, AFL x 1 year on coumadin, HTN, hyperlipidemia, DM, ESRD on HD, and hypothyroidism originally admitted to [facility] with GIB, transferred to [facility], found to have septic shock and MRSA bacteremia, trx to MICU s/p PEA arrest, intubated (7/13/2021) and now extubated (7/23/2021).    CT Head 7/13/2021: Foci of low density in the right anterolateral frontal lobe and left posterior temporal lobe are suspicious for the presence of acute ischemic changes. No CT evidence for hemorrhagic transformation. Chronic ischemic changes as described.    CXR 7/19/2021: Unchanged patchy right upper lung opacity. Improved patchy right lower lung opacity. Worsening left perihilar opacities. Increased left basilar and retrocardiac opacity which may be due to a left pleural effusion with passive atelectasis, atelectasis of other cause, and/or pneumonia.    Consult received and chart reviewed. Patient seen for clinical swallow evaluation. Patient received sleeping, receiving supplemental oxygen via nasal cannula at 4 lpm. Patient aroused with verbal and tactile cues. Patient with difficulty following simple commands or making wants/needs known. During clinical swallow evaluation, patient producing nonsensical words. Patient with baseline increased work of breath and wet cough. SpO2 ~99% during today's evaluation.     Results and recommendations discussed with RN and MD on unit.

## 2021-07-29 NOTE — MEDICAL STUDENT PROGRESS NOTE(EDUCATION) - SUBJECTIVE AND OBJECTIVE BOX
Mr Hardin is a 69 yo male with hx of CAD, T2DM, ESRD, afib who presented on 7/7 with chest pain and had a subsequent ICU course complicated by septic shock, MRSA bacteremia, 10 min cardiac arrest, CVA, serratia bacteremia and infective endocarditis.     S: Mr. Hardin was moved overnight to a unit able to monitor pulse oximetry 24 hrs per day. No other acute overnight events. This morning, he presents in bed and is unable to communicate. He opens eyes to voice/touch but does not track. He does not speak or follow commands, although he does move extremities without apparent purpose.    O: VS T 97.8, HR87, 128/62, spo2 100% on 3L NC, RR20  Exam reveals pt is laying in bed with NC on. PERRL without tracking. Lungs CTAB, although he appears to have some gasping with harsh inspiration when breathing at rest. Heart sounds normal s1s2 without murmur. ABD is soft and nontender without guarding. Upper and lower extremities occasionally move spontaneously but without purpose. LE edema is present with 1+ pitting around ankle. Distal pulses present but weak b/l.

## 2021-07-29 NOTE — PROGRESS NOTE ADULT - SUBJECTIVE AND OBJECTIVE BOX
Medicine Progress Note    Abdias Moreno, MS4  Team 8  #[id number]    Patient is a 70y old  Male who presents with a chief complaint of Chest pain and GIB (28 Jul 2021 18:17)      SUBJECTIVE / OVERNIGHT EVENTS: No acute overnight events. Patient unable to answer questions    MEDICATIONS  (STANDING):  atorvastatin 40 milliGRAM(s) Oral at bedtime  cefepime   IVPB 1000 milliGRAM(s) IV Intermittent daily  chlorhexidine 2% Cloths 1 Application(s) Topical daily  dextrose 40% Gel 15 Gram(s) Oral once  dextrose 5%. 1000 milliLiter(s) (50 mL/Hr) IV Continuous <Continuous>  dextrose 5%. 1000 milliLiter(s) (100 mL/Hr) IV Continuous <Continuous>  dextrose 50% Injectable 12.5 Gram(s) IV Push once  dextrose 50% Injectable 25 Gram(s) IV Push once  dextrose 50% Injectable 25 Gram(s) IV Push once  diltiazem    Tablet 30 milliGRAM(s) Oral four times a day  epoetin danilo-epbx (RETACRIT) Injectable 90735 Unit(s) IV Push <User Schedule>  glucagon  Injectable 1 milliGRAM(s) IntraMuscular once  insulin lispro (ADMELOG) corrective regimen sliding scale   SubCutaneous every 6 hours  midodrine 10 milliGRAM(s) Oral every 8 hours  pantoprazole    Tablet 40 milliGRAM(s) Oral two times a day  vancomycin  IVPB 750 milliGRAM(s) IV Intermittent <User Schedule>    MEDICATIONS  (PRN):  acetaminophen   Tablet .. 1000 milliGRAM(s) Oral every 6 hours PRN Temp greater or equal to 38C (100.4F), Mild Pain (1 - 3), Moderate Pain (4 - 6)  calamine/zinc oxide Lotion 1 Application(s) Topical three times a day PRN Itching  metoprolol tartrate Injectable 5 milliGRAM(s) IV Push every 6 hours PRN HR>100 and/or SBP>140    CAPILLARY BLOOD GLUCOSE      POCT Blood Glucose.: 148 mg/dL (29 Jul 2021 06:23)  POCT Blood Glucose.: 130 mg/dL (28 Jul 2021 22:18)  POCT Blood Glucose.: 135 mg/dL (28 Jul 2021 17:45)  POCT Blood Glucose.: 131 mg/dL (28 Jul 2021 12:23)  POCT Blood Glucose.: 158 mg/dL (28 Jul 2021 09:09)    I&O's Summary    28 Jul 2021 07:01  -  29 Jul 2021 07:00  --------------------------------------------------------  IN: 50 mL / OUT: 0 mL / NET: 50 mL        PHYSICAL EXAM:  Vital Signs Last 24 Hrs  T(C): 36.4 (29 Jul 2021 08:30), Max: 37.1 (28 Jul 2021 10:00)  T(F): 97.5 (29 Jul 2021 08:30), Max: 98.8 (28 Jul 2021 10:00)  HR: 76 (29 Jul 2021 08:30) (76 - 92)  BP: 185/76 (29 Jul 2021 08:30) (119/54 - 185/76)  RR: 19 (29 Jul 2021 08:30) (18 - 22)  SpO2: 100% (29 Jul 2021 05:00) (98% - 100%)  CONSTITUTIONAL: Patient in NAD  ENMT: Moist oral mucosa, no pharyngeal injection or exudates  RESPIRATORY: Diminished respiratory effort; lungs remain clear to auscultation bilaterally  CARDIOVASCULAR: Regular rate and rhythm, normal S1 and S2, no murmur/rub/gallop; LE pitting edema present, more pronounced on feet, R > L; Peripheral pulses are 1+ bilaterally  ABDOMEN: Normoactive bowel sounds, no rebound/guarding; No hepatosplenomegaly  NEUROLOGY: Pupils equal and reactive. Patient will attend to some sound with some spontaneous eye opening. Nonverbal and unable to assess strength. Has some spontaneous arm movements L > R    LABS:                        9.2    11.57 )-----------( 259      ( 29 Jul 2021 07:09 )             31.9     07-29    141  |  99  |  39<H>  ----------------------------<  124<H>  3.9   |  24  |  5.39<H>    Ca    9.4      29 Jul 2021 07:09  Phos  4.5     07-29  Mg     2.10     07-29    TPro  6.2  /  Alb  2.2<L>  /  TBili  2.3<H>  /  DBili  x   /  AST  58<H>  /  ALT  35  /  AlkPhos  454<H>  07-29              COVID-19 PCR: NotDetec (04 Jul 2021 11:15)      RADIOLOGY & ADDITIONAL TESTS:  Imaging from Last 24 Hours: None Medicine Progress Note    Abdias Moreno, MS4  Team 8  #[id number]    Patient is a 70y old  Male who presents with a chief complaint of Chest pain and GIB (28 Jul 2021 18:17)      SUBJECTIVE / OVERNIGHT EVENTS: No acute overnight events. Patient unable to answer questions    MEDICATIONS  (STANDING):  atorvastatin 40 milliGRAM(s) Oral at bedtime  cefepime   IVPB 1000 milliGRAM(s) IV Intermittent daily  chlorhexidine 2% Cloths 1 Application(s) Topical daily  dextrose 40% Gel 15 Gram(s) Oral once  dextrose 5%. 1000 milliLiter(s) (50 mL/Hr) IV Continuous <Continuous>  dextrose 5%. 1000 milliLiter(s) (100 mL/Hr) IV Continuous <Continuous>  dextrose 50% Injectable 12.5 Gram(s) IV Push once  dextrose 50% Injectable 25 Gram(s) IV Push once  dextrose 50% Injectable 25 Gram(s) IV Push once  diltiazem    Tablet 30 milliGRAM(s) Oral four times a day  epoetin danilo-epbx (RETACRIT) Injectable 40326 Unit(s) IV Push <User Schedule>  glucagon  Injectable 1 milliGRAM(s) IntraMuscular once  insulin lispro (ADMELOG) corrective regimen sliding scale   SubCutaneous every 6 hours  midodrine 10 milliGRAM(s) Oral every 8 hours  pantoprazole    Tablet 40 milliGRAM(s) Oral two times a day  vancomycin  IVPB 750 milliGRAM(s) IV Intermittent <User Schedule>    MEDICATIONS  (PRN):  acetaminophen   Tablet .. 1000 milliGRAM(s) Oral every 6 hours PRN Temp greater or equal to 38C (100.4F), Mild Pain (1 - 3), Moderate Pain (4 - 6)  calamine/zinc oxide Lotion 1 Application(s) Topical three times a day PRN Itching  metoprolol tartrate Injectable 5 milliGRAM(s) IV Push every 6 hours PRN HR>100 and/or SBP>140    CAPILLARY BLOOD GLUCOSE      POCT Blood Glucose.: 148 mg/dL (29 Jul 2021 06:23)  POCT Blood Glucose.: 130 mg/dL (28 Jul 2021 22:18)  POCT Blood Glucose.: 135 mg/dL (28 Jul 2021 17:45)  POCT Blood Glucose.: 131 mg/dL (28 Jul 2021 12:23)  POCT Blood Glucose.: 158 mg/dL (28 Jul 2021 09:09)    I&O's Summary    28 Jul 2021 07:01  -  29 Jul 2021 07:00  --------------------------------------------------------  IN: 50 mL / OUT: 0 mL / NET: 50 mL        PHYSICAL EXAM:  Vital Signs Last 24 Hrs  T(C): 36.4 (29 Jul 2021 08:30), Max: 37.1 (28 Jul 2021 10:00)  T(F): 97.5 (29 Jul 2021 08:30), Max: 98.8 (28 Jul 2021 10:00)  HR: 76 (29 Jul 2021 08:30) (76 - 92)  BP: 185/76 (29 Jul 2021 08:30) (119/54 - 185/76)  RR: 19 (29 Jul 2021 08:30) (18 - 22)  SpO2: 100% (29 Jul 2021 05:00) (98% - 100%)  CONSTITUTIONAL: Patient in NAD  ENMT: Moist oral mucosa, no pharyngeal injection or exudates  RESPIRATORY: Diminished respiratory effort; lungs remain clear to auscultation bilaterally  CARDIOVASCULAR: Regular rate and rhythm, normal S1 and S2, no murmur/rub/gallop; LE pitting edema present, more pronounced on feet, R > L; Peripheral pulses are 1+ bilaterally  ABDOMEN: Normoactive bowel sounds, no rebound/guarding; No hepatosplenomegaly  NEUROLOGY: Pupils equal and reactive. Patient with some spontaneous eye opening, no attending to sound when called by name. Nonverbal and unable to assess strength. Has some spontaneous arm movements L > R    LABS:                        9.2    11.57 )-----------( 259      ( 29 Jul 2021 07:09 )             31.9     07-29    141  |  99  |  39<H>  ----------------------------<  124<H>  3.9   |  24  |  5.39<H>    Ca    9.4      29 Jul 2021 07:09  Phos  4.5     07-29  Mg     2.10     07-29    TPro  6.2  /  Alb  2.2<L>  /  TBili  2.3<H>  /  DBili  x   /  AST  58<H>  /  ALT  35  /  AlkPhos  454<H>  07-29              COVID-19 PCR: NotDetec (04 Jul 2021 11:15)      RADIOLOGY & ADDITIONAL TESTS:  Imaging from Last 24 Hours: None

## 2021-07-29 NOTE — MEDICAL STUDENT PROGRESS NOTE(EDUCATION) - NS MD HP STUD ASPLAN ASSES FT
Mr Hardin is a 69 yo male with hx of CAD, T2DM, ESRD, afib who presented on 7/7 with chest pain and had a subsequent ICU course complicated by septic shock, MRSA bacteremia, 10 min cardiac arrest, CVA, serratia bacteremia and infective endocarditis. He was able to shifted to a floor with more monitoring due to possibility of desaturation from poor breathing or aspiration. Main concerns are treatment of infection (bacteremia + endocarditis). Palliative on board with GOC with family

## 2021-07-29 NOTE — PROGRESS NOTE ADULT - PROBLEM SELECTOR PLAN 5
Sinus rhythm currently  - In setting of NPO status, switch from PO diltiazem IV lopressor 2.5mg for acute rate control as needed  - Cardiology following

## 2021-07-29 NOTE — PROGRESS NOTE ADULT - ASSESSMENT
Patient seen and examined, agree with above assessment and plan as transcribed above.    - a bit more alert today  - HD per rewnal  - May need PEG family are considering   - Palliative f/u  - D/W family in detail, His prognosis is poor and is struggling with multiple life threatening and chronic illness.    Murphy Colin MD, FACC  BEEPER [phone]

## 2021-07-29 NOTE — PROGRESS NOTE ADULT - SUBJECTIVE AND OBJECTIVE BOX
Date of Service: 07-29-21 @ 11:58    Patient is a 70y old  Male who presents with a chief complaint of Chest pain and GIB (29 Jul 2021 11:29)    Any change in ROS:   Seen during HD  Lethargic but arousable  O2 sats 100% on 4LNC    MEDICATIONS  (STANDING):  atorvastatin 40 milliGRAM(s) Oral at bedtime  cefepime   IVPB 1000 milliGRAM(s) IV Intermittent daily  chlorhexidine 2% Cloths 1 Application(s) Topical daily  dextrose 40% Gel 15 Gram(s) Oral once  dextrose 5%. 1000 milliLiter(s) (50 mL/Hr) IV Continuous <Continuous>  dextrose 5%. 1000 milliLiter(s) (100 mL/Hr) IV Continuous <Continuous>  dextrose 50% Injectable 12.5 Gram(s) IV Push once  dextrose 50% Injectable 25 Gram(s) IV Push once  dextrose 50% Injectable 25 Gram(s) IV Push once  diltiazem    Tablet 30 milliGRAM(s) Oral four times a day  epoetin danilo-epbx (RETACRIT) Injectable 15487 Unit(s) IV Push <User Schedule>  glucagon  Injectable 1 milliGRAM(s) IntraMuscular once  insulin lispro (ADMELOG) corrective regimen sliding scale   SubCutaneous every 6 hours  pantoprazole    Tablet 40 milliGRAM(s) Oral two times a day  vancomycin  IVPB 750 milliGRAM(s) IV Intermittent <User Schedule>    MEDICATIONS  (PRN):  acetaminophen   Tablet .. 1000 milliGRAM(s) Oral every 6 hours PRN Temp greater or equal to 38C (100.4F), Mild Pain (1 - 3), Moderate Pain (4 - 6)  calamine/zinc oxide Lotion 1 Application(s) Topical three times a day PRN Itching  metoprolol tartrate Injectable 5 milliGRAM(s) IV Push every 6 hours PRN HR>100 and/or SBP>140    Vital Signs Last 24 Hrs  T(C): 36.4 (29 Jul 2021 08:30), Max: 36.6 (28 Jul 2021 21:20)  T(F): 97.5 (29 Jul 2021 08:30), Max: 97.9 (28 Jul 2021 21:20)  HR: 76 (29 Jul 2021 08:30) (76 - 92)  BP: 185/76 (29 Jul 2021 08:30) (119/54 - 185/76)  BP(mean): --  RR: 19 (29 Jul 2021 08:30) (18 - 22)  SpO2: 100% (29 Jul 2021 05:00) (98% - 100%)    I&O's Summary    28 Jul 2021 07:01  -  29 Jul 2021 07:00  --------------------------------------------------------  IN: 50 mL / OUT: 0 mL / NET: 50 mL    Physical Exam:   GENERAL: NAD  HEENT: KATHY  ENMT: No tonsillar erythema, exudates, or enlargement  NECK: Supple, No JVD  CHEST/LUNG: Decreased BS   CVS: Regular rate and rhythm  GI: : Soft, Nontender, Nondistended  NERVOUS SYSTEM:  Alert & Oriented X3  EXTREMITIES:  2+ Peripheral Pulses, 1+ edema  LYMPH: No lymphadenopathy noted  SKIN: No rashes or lesions  PSYCH: Appropriate    Labs:                     9.2    11.57 )-----------( 259      ( 29 Jul 2021 07:09 )             31.9                         8.4    13.56 )-----------( 263      ( 28 Jul 2021 07:33 )             29.3                         8.6    11.40 )-----------( 266      ( 27 Jul 2021 03:53 )             29.6                         8.0    15.07 )-----------( 266      ( 26 Jul 2021 04:25 )             27.2     07-29    141  |  99  |  39<H>  ----------------------------<  124<H>  3.9   |  24  |  5.39<H>  07-28    139  |  100  |  28<H>  ----------------------------<  149<H>  3.6   |  24  |  4.14<H>  07-27    138  |  96<L>  |  45<H>  ----------------------------<  123<H>  4.2   |  23  |  6.08<H>  07-26    134<L>  |  93<L>  |  36<H>  ----------------------------<  140<H>  3.7   |  25  |  5.06<H>    Ca    9.4      29 Jul 2021 07:09  Ca    9.2      28 Jul 2021 07:33  Phos  4.5     07-29  Mg     2.10     07-29    TPro  6.2  /  Alb  2.2<L>  /  TBili  2.3<H>  /  DBili  x   /  AST  58<H>  /  ALT  35  /  AlkPhos  454<H>  07-29  TPro  6.2  /  Alb  2.3<L>  /  TBili  2.0<H>  /  DBili  x   /  AST  67<H>  /  ALT  32  /  AlkPhos  340<H>  07-27  TPro  6.4  /  Alb  2.5<L>  /  TBili  2.0<H>  /  DBili  x   /  AST  63<H>  /  ALT  27  /  AlkPhos  310<H>  07-26    CAPILLARY BLOOD GLUCOSE  POCT Blood Glucose.: 107 mg/dL (29 Jul 2021 09:03)  POCT Blood Glucose.: 148 mg/dL (29 Jul 2021 06:23)  POCT Blood Glucose.: 130 mg/dL (28 Jul 2021 22:18)  POCT Blood Glucose.: 135 mg/dL (28 Jul 2021 17:45)  POCT Blood Glucose.: 131 mg/dL (28 Jul 2021 12:23)    LIVER FUNCTIONS - ( 29 Jul 2021 07:09 )  Alb: 2.2 g/dL / Pro: 6.2 g/dL / ALK PHOS: 454 U/L / ALT: 35 U/L / AST: 58 U/L / GGT: x           RECENT CULTURES:  07-24 @ 18:47 .Blood Blood-Peripheral     No growth to date.    07-24 @ 14:28 .Blood Blood-Peripheral     Growth in aerobic bottle: Gram Negative Rods  Growth in anaerobic bottle: Gram Negative Rods    Growth in aerobic and anaerobic bottles: Serratia marcescens  See previous culture [id number]    07-22 @ 22:57 .Blood Blood   PCR    Growth in aerobic bottle: Gram Negative Rods  Growth in anaerobic bottle: Gram Negative Rods    Blood Culture PCR  Serratia marcescens  Blood Culture PCR     Growth in aerobic bottle: Serratia marcescens  Growth in anaerobic bottle: Gram Negative Rods  ***Blood Panel PCR results on this specimen are available  approximately 3 hours after the Gram stain result.***  Gram stain, PCR, and/or culture resultsmay not always  correspond due to difference in methodologies.  ************************************************************  This PCR assay was performed by multiplex PCR. This  Assay tests for 66 bacterial and resistance gene targets.  Please referto the [facility] test directory  at [email or web address] for details.      Studies  Chest X-RAY < from: Xray Chest 1 View- PORTABLE-Urgent (Xray Chest 1 View- PORTABLE-Urgent .) (07.19.21 @ 20:01) >    INTERPRETATION:    Heart size and the mediastinum cannot be accurately evaluated on this projection.  ET tube tip is 3.4 cm above the joshua.  Enteric tube looped upon itself in the stomach. Tip and side port are in the stomach.  Left IJ line with tip in left brachiocephalic vein.  Unchanged patchy right upper lung opacity. Improved patchy right lower lung opacity. Worsening patchy left perihilar opacities.  There is increased left basilar and retrocardiac opacity, now with obscuration of the left hemidiaphragm.  No right pleural effusion. No pneumothorax.  Left subclavian vascular stent again seen.      IMPRESSION:  Enteric tube looped upon itself in the stomach. The tip and side port are in the stomach.    ET tube and left IJ line as above.    Unchanged patchy right upper lung opacity. Improved patchy right lower lung opacity. Worsening left perihilar opacities.    Increased left basilar and retrocardiac opacity which may be due to a left pleural effusion with passive atelectasis, atelectasis of other cause, and/or pneumonia.    < end of copied text >    CT SCAN Chest < from: CT Chest w/ IV Cont (07.13.21 @ 18:36) >  FINDINGS:  CHEST:  LUNGS, LARGE AIRWAYS, AND PLEURA: Minimal secretions within the distal trachea and right main stem bronchus. Endotracheal tube is in place. Small bilateral pleural effusions,,partially loculated, with adjacent passive atelectasis. Vascular congestion and groundglass opacities.  VESSELS: Left IJ approach central venous catheter with the tip in the left brachiocephalic vein. Air is seen within the left brachiocephalic, likely secondary to injections. Left axillary vein stent is patent. Calcifications of the aortic arch and coronary vessels. CABG.  HEART: Cardiomegaly. Left ventricular free wall fatty replacement, likely related to prior myocardial infarction. No pericardial effusion. Retained epicardial pacemaker leads.  MEDIASTINUM AND YOANA: No lymphadenopathy.  CHEST WALL AND LOWER NECK: Enlarged thyroid gland with multiple hypodense nodules bilaterally, the right containing calcifications, appear unchanged. Median sternotomy.  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  BILE DUCTS: Normal caliber.  GALLBLADDER: Within normal limits.  SPLEEN: Within normal limits.  PANCREAS: Within normal limits.  ADRENALS: Within normal limits.  KIDNEYS/URETERS: Atrophic bilateral kidneys. No hydronephrosis. Exophytic 1.3 cm cyst arising from the left kidney.    BLADDER: Decompressed.  REPRODUCTIVE ORGANS: Prostate within normal limits. Bilateral hydroceles.    BOWEL: No bowel obstruction. Appendix is normal. Enteric tube is present with the tip in stomach.  PERITONEUM: Mild to moderate ascites in the perihepatic and perisplenic regions, tracking along the paracolic gutters. Trace ascites in the pelvis.  VESSELS: Atherosclerotic changes.  RETROPERITONEUM/LYMPH NODES: No lymphadenopathy.  ABDOMINAL WALL: Diffuse subcutaneous edema.  BONES: Degenerative changes.    IMPRESSION:  *  Mild pulmonary edema with bilateral partially loculated pleural effusions with adjacent passive atelectasis.  *  Debris within the right mainstem with associated partial atelectasis of the right lower lobe.  *  Mild to moderate ascites.    < end of copied text >    < from: TRUMAN w/o TTE (w/3D Echo) (07.19.21 @ 09:02) >  ------------------------------------------------------------------------  CONCLUSIONS:  1. A large (about  1.3 cm X 0.3 cm),  mobile echodensity is  visualized attached to the atrial aspect of the medial (P3)  scallop of the posterior mitral valve leaflet and is  consistent with a vegetation.  In addition, a portion of  the anterior mitral leaflet is markedly thickened and this  may represent sessile vegetation. Moderate mitral  regurgitation.  Vena contracta width about  0.4 - 0.5 cm.  2. Calcified trileaflet aortic valve with normal opening.  No vegetations seen in association with the aortic valve.  No aortic valve regurgitation seen.  3. Normal aortic root.  Mild non-mobile atherosclerotic  plaque in the aortic arch.  Severe, non-mobile  atherosclerotic plaque in the descending thoracic aorta.  4. Normal left atrium.  No left atrial or left atrial  appendage thrombus.  5. Mild segmental left ventricular systolic dysfunction.  Hypokinesis of the basal to mid inferior wall.  6. Right ventricular enlargement with decreased right  ventricular systolic function.  7. Normal tricuspid valve.  No vegetations seen in  association with the tricuspid valve.  Moderate-severe  tricuspid regurgitation.  8. Agitated saline injection and color flow Doppler  demonstrate evidence of a patent foramen ovale.    < end of copied text >    < from: CT Head No Cont (07.27.21 @ 09:27) >  FINDINGS:  Ongoing evolution of subacute infarct involving the anterior frontal lobe. Chronic right posterior frontal lobe and occipital lobe infarcts are again noted. Chronic left cerebellar infarct.    No acute intracranial hemorrhage.    White matter hypoattenuating foci are noted, compatible with age-indeterminate small vessel disease.    No hydrocephalus. No extra-axial fluid collections.    Bilateral cataract surgery. Orbits are otherwise unremarkable. The imaged portions of the paranasal sinuses are essentially clear. Bilateral mastoid effusions are present. The visualized soft tissues and osseous structures appear normal.    IMPRESSION:  -No sizable new infarct identified. No acute intracranial hemorrhage.  -Ongoing evolution of subacute right frontal lobe infarct.  -Multiple chronic infarcts as described.    < end of copied text >

## 2021-07-29 NOTE — SWALLOW BEDSIDE ASSESSMENT ADULT - ASR SWALLOW ASPIRATION MONITOR
position upright (90Y)/cough/gurgly voice/fever/throat clearing
change of breathing pattern/oral hygiene/position upright (90Y)/cough/gurgly voice/fever/pneumonia/throat clearing/upper respiratory infection

## 2021-07-29 NOTE — PROGRESS NOTE ADULT - ASSESSMENT
71 yo M with PMhx of CAD s/p CABG (course at that time complicated by pleural effusion requiring chest tubes), ESRD on HD (TTS), DMII, HTN, afib on coumadin, and anemia presented from [facility] for evaluation of chest pain and GIB. Renal following for ESRD Mx. s/p cardiac arrest. CTH showed acute infarcts. Further w/u, MRSA bacteremia, TRUMAN showed MR vegetation.     ESRD  Maintenance schedule TTS  K acceptable  Improved volume status.   Repeat HD today, completed 3.5 hour treatment, UF goal lowered to 1kg, as pt had intradialytic hypotension.   On diltiazem for afib rate control.   c/w vancomycin ivpb post hd, dosed by levels, for MRSA bacteremia and endocarditis.   dose all meds for ESRD  renal diet, fluid restriction    Anemia in CKD+GIB- Hb <goal  c/w Epo 20k tiw with HD  s/p prbc     Rudy Mendiola MD  [facility]  Office [phone]  Ans Serv [phone]  [facility] - [phone]

## 2021-07-29 NOTE — PROGRESS NOTE ADULT - ATTENDING COMMENTS
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, f/up repeat blood cultures from 7/29, not a surgical candidate, check CT A/P, c/w IV Vanco and Cefepime, monitor Vanco level, ID following   Septic shock d/t MRSA/serratia bacteremia, shock resolved s/p pressors, weaning off Midodrine   Acute embolic stroke, restart Coumadin w/o bridging and monitor for bleeding once able to take PO  Acute blood loss anemia d/t R adductor muscle hematoma w/o active extravasation, s/p 3 units PRBC total, monitor H/H  Acute encephalopathy d/t acute CVA and possible anoxic brain injury post cardiac arrest, MRI brain unlikely to , GOC discussion   Acute hypoxic respiratory failure in setting of cardiac arrest, s/p intubation and now extubated, c/w supplemental O2, continuous pulse oxymetry   Paroxysmal Afib, restart Coumadin w/o bridging and monitor INR once able to take PO, c/w IV Lopressor   ESRD, hemodialysis as per renal, may need to restart Midodrine on dialysis days if hypotensive w/ dialysis   Dysphagia, NPO for now, speech and swallow re-eval, aspiration precautions, will consider NGT placement if fails dysphagia assessment     DNR/DNI

## 2021-07-29 NOTE — SWALLOW BEDSIDE ASSESSMENT ADULT - SWALLOW EVAL: RECOMMENDED DIET
1. PO is contraindicated, consideration for short term alternate means of nutrition/hydration/medication 2. Consideration for goals of care/nutritional plan of care conversation with family
1. Dysphagia 1 (puree) with honey-thick liquids with strict aspiration precautions. 2. Aspiration precautions of: upright posture during PO intake, reduced rate, small bites/sips, take breaks as needed, feed patient only when alert/awake.

## 2021-07-29 NOTE — PROGRESS NOTE ADULT - ASSESSMENT
71 yo M with PMhx of CAD s/p CABG (course at that time complicated by pleural effusion requiring chest tubes), ESRD on HD (MWF), DMII, HTN, afib on coumadin, and anemia presented from [facility] for evaluation of chest pain and GIB.       left sided pleural effusion: loculated and chronic:   CAD:  CABG:  ESRD:   DM:  HTN:  A fibrillation:   GI Bleed    7/7:    left sided pleural effusion: loculated and chronic: he has chr loculated pleural effusion on left side: He had chest tube placement in last June 2020 at another hospital: no chemistry is available but he had negative cytology at that time: This time the effusion seems slightly worse and has loculated effusion with pleural thickening: heis not SOB andhasbeen on roomair: I think , it at all, if this effusion needs to be dealt with : it is probably vats: will contact thoracic surgery : etiology not very clear as there is no previous chemistry: coult be exudative or transudatve: he is on HD and now it is a chr christiano effusion   CAD: cont per cards  CABG: cont current meds  ESRD: on HD   DM: monitor and control  HTN: controlled  A fibrillation: off ac:   GI Bleed: per gi :  DW pt and t [id number]    7/8:    left sided pleural effusion:: fever: s/p left sided chest tube placement: cultures sent: however with pr criteria it seems transudative await serum LDH but pleural fluid LDH is low:  already started on broad spectrum antibiotics ID to see: await cultures:   CAD: cont per cards  CABG: cont current meds  ESRD: on HD   DM: monitor and control  HTN: controlled  A fibrillation: off ac:   GI Bleed: per gi :  DW pmd    7/9:    left sided pleural effusion:: fever: s/p left sided chest tube placement: cultures sent: however with pr criteria it seems borderline transudative and transudative by LDH criteria: the vulutres ahve been negative so far:  already started on broad spectrum antibiotics ID to see: await cultures: ? source of spesis  CAD: cont per cards  CABG: cont current meds  ESRD: on HD   DM: monitor and control  HTN: in shock: now on vasopressors:   A fibrillation: off ac:   GI Bleed: per gi :  DW pmd  micu team    7/11:      left sided pleural effusion:: fever: s/p left sided chest tube placement: cultures sent: however with pr criteria it seems borderline transudative and transudative by LDH criteria: the cultures have been negative so far:  already started on broad spectrum antibiotics : Has MSSA bactermia  CAD: cont per cards: still on vasopressors: wean as tolerated ? needs molly ro tule out IE : would defer to cards:   CABG: cont current meds  ESRD: on HD   DM: monitor and control  HTN: in shock: now on vasopressors:   A fibrillation: off ac:   GI Bleed: per gi :  DW pmd  micu team    7/12:    left sided pleural effusion:: fever: s/p left sided chest tube placement:- now removed: cultures sent: however with pr criteria it seems borderline transudative and transudative by LDH criteria: the cultures have been negative so far:  already started on broad spectrum antibiotics : Has MRSA bactermia: vanco restarted yesterday ? echo/molly  Ac hypercarbic resp fialure: needs to be watched closelyin MICU : needs bipap may need intubation" At this time he is alert and awake: and responds to simple questions:    CAD: cont per cards: still on vasopressors: wean as tolerated ? needs molly ro tule out IE : would defer to cards:   CABG: cont current meds  ESRD: on HD   DM: monitor and control  HTN: in shock: now on vasopressors:   A fibrillation: off ac:   GI Bleed: per gi :  DW pmd  cards and bedstaff    7/13:  left sided pleural effusion:: fever: s/p left sided chest tube placement:- now removed: cultures sent: however with pr criteria it seems borderline transudative and transudative by LDH criteria: the cultures have been negative so far:  already started on broad spectrum antibiotics : Has MRSA bacteremia vanco restarted yesterday ? echo/molly  Ac hypercarbic resp fialure: now intubated s/p acls protocol last night   CAD: cont per cards: still on vasopressors: wean as tolerated ? needs molly ro tule out IE : would defer to cards:   CABG: cont current meds  ESRD: on HD   DM: monitor and control  HTN: in shock: now on vasopressors:   A fibrillation: off ac:   GI Bleed: per gi :  DELLA  staff  pt is critically ill now:     7/14:  left sided pleural effusion:: fever: s/p left sided chest tube placement:- now removed: cultures sent: however with pr criteria it seems borderline transudative and transudative by LDH criteria: the cultures have been negative so far:  already started on broad spectrum antibiotics : Has MRSA bacteremia vanco restarted yesterday now for  echo/molly  Ac hypercarbic resp fialure: now intubated s/p acls protocol last night -s/p cardiac arrest: ROSC 10 mts:   CAD: cont per cards: still on vasopressors: wean as tolerated ? needs molly ro tule out IE : would defer to cards: Cotn antbitioc  CABG: cont current meds  ESRD: on HD   DM: monitor and control  HTN: in shock: now on vasopressors:   A fibrillation: off ac:   GI Bleed: per gi :  DW  staff  pt is critically ill now: dw cards    7/15:    left sided pleural effusion:: transudative pl effusion with no with: certainly not the source for MRSA in blood:   Ac hypercarbic resp fialure: now intubated s/p acls protocol last night -s/p cardiac arrest: ROSC 10 mts:   CAD: cont per cards: still on vasopressors: wean as tolerated ? needs molly ro tule out IE : would defer to cards: Cotn antbitioc  CABG: cont current meds  ESRD: on HD   DM: monitor and control  HTN: in shock: now on vasopressors:   A fibrillation: on ac:   GI Bleed: per gi :  DW  staff  pt is critically ill now:     7/16"  left sided pleural effusion:: transudative pl effusion with no with: certainly not the source for MRSA in blood: ?source for MRSA:   Ac hypercarbic resp fialure: now intubated s/p acls protocol last night -s/p cardiac arrest: ROSC 10 mts: he seems to respond off sedation:per MICU note:   CAD: cont per cards: still on vasopressors: wean as tolerated ? needs molly ro tule out IE : would defer to cards: Cotn antbitioc  CABG: cont current meds  ESRD: on HD   DM: monitor and control  HTN: in shock: now on vasopressors:   A fibrillation: on ac:   GI Bleed: per gi :  DW  staff: PMD  pt is critically ill now:      7/17:    left sided pleural effusion:: transudative pl effusion with no with: certainly not the source for MRSA in blood: ?source for MRSA: for molly on monday  Ac hypercarbic resp fialure: now intubated s/p acls protocol last night -s/p cardiac arrest: ROSC 10 mts:   CAD: cont per cards: still on vasopressors: wean as tolerated ? needs molly ro tule out IE : would defer to cards: Cont antibiotics:   CABG: cont current meds  ESRD: on HD   DM: monitor and control  HTN: in shock: now on vasopressors:   A fibrillation: on ac:   GI Bleed: per gi :  DW  staff: PMD  pt is critically ill now:    7/18:    left sided pleural effusion:: transudative pl effusion with no with: certainly not the source for MRSA in blood: ?source for MRSA: for molly on monday: he is off sedation   Ac hypercarbic resp fialure: now intubated s/p acls protocol last night -s/p cardiac arrest: ROSC 10 mts:   Acute cva: noted on ct scan head: neurology following: already on heparin   CAD: cont per cards: still on vasopressors: wean as tolerated ? needs molly ro tule out IE : would defer to cards: Cont antibiotics:   CABG: cont current meds  ESRD: on HD   DM: monitor and control  HTN: in shock: now on vasopressors:   A fibrillation: on ac:   GI Bleed: per gi :  pt is critically ill now:    7/19:      left sided pleural effusion:: transudative pl effusion with no with: certainly not the source for MRSA in blood: ?source for MRSA: for molly today: he is off sedation but still on pressors:   Ac hypercarbic resp fialure: now intubated s/p acls protocol last night -s/p cardiac arrest: ROSC 10 mts:   Acute cva: noted on ct scan head: neurology following: already on heparin   CAD: cont per cards: still on vasopressors: wean as tolerated ? needs molly ro tule out IE : would defer to cards: Cont antibiotics: for molly today  CABG: cont current meds  ESRD: on HD   DM: monitor and control  HTN: in shock: now on vasopressors:   A fibrillation: on ac:   GI Bleed: per gi :  pt is critically ill now:    7/20  Acute Hypercarbic Respiratory Failure  -S/p RRT, tx to MICU 7/8, intubated 7/13 s/p cardiac arrest w/ ROSC  -CPAP trials, ventilator management per MICU team  -Pulmonary toileting, suction PRN   L pleural effusion   -Loculated L pleural effusion on CT chest, s/p CT placement by thoracic 7/8  -Transudative effusion, Cx negative   -CT since removed   -Repeat CT chest with small b/l partially loculated effusions, adjacent atelectasis  Bacteremia  -BC 7/9 MRSA+  -ABX per ID  -Repeat BC NGTD   -F/u cardiology recs regarding MOLLY   CAD (s/p CABG) - per cards   ESRD - HD per renal   GI bleed - GI recs noted     7/21  Acute Hypercarbic Respiratory Failure  -S/p RRT, tx to MICU 7/8, intubated 7/13 s/p cardiac arrest w/ ROSC  -CPAP trials, ventilator management per MICU team  -Pulmonary toileting, suction PRN   L pleural effusion   -Loculated L pleural effusion on CT chest, s/p CT placement by thoracic 7/8  -Transudative effusion, Cx negative   -CT since removed   -Repeat CT chest with small b/l partially loculated effusions, adjacent atelectasis  Bacteremia  -BC 7/9 MRSA+  -ABX per ID  -Repeat BC NGTD   -MOLLY now with MV vegetation per cards  -Pending CT lumbar spine   -F/u CT surgery recs  CAD (s/p CABG) - per cards   ESRD - HD per renal   GI bleed - GI recs noted     7/22  Acute Hypercarbic Respiratory Failure  --S/p RRT, tx to MICU 7/8, intubated 7/13 s/p cardiac arrest w/ ROSC-10 mts  --CPAP trials, ventilator management per MICU team  --Pulmonary toileting, suction PRN   L pleural effusion   --Loculated L pleural effusion on CT chest, s/p CT placement by thoracic 7/8  --Transudative effusion, Cx negative   --CT since removed   --Repeat CT chest with small b/l partially loculated effusions, adjacent atelectasis  Bacteremia  --BC 7/9 MRSA+, repeat BC NGTD   --MOLLY now with MV vegetation, not a candidate for surgery per CTS  --CT lumbar spine with no evidence of discitis   --ABX per ID  DVT prophylaxis  --AC held due to drop in H/H  --Concern for R thigh hematoma, vascular recs noted   CAD (s/p CABG) - per cards   ESRD - HD per renal   GI bleed - resolved, GI recs noted   Care per MICU team     7/23  Acute Hypercarbic Respiratory Failure  --S/p RRT, tx to MICU 7/8, intubated 7/13 s/p cardiac arrest w/ ROSC-10 mts  --Tolerating CPAP trials, plan to extubate per RN   --Ventilator management per MICU team  --Pulmonary toileting, suction PRN   L pleural effusion   --Loculated L pleural effusion on CT chest, s/p CT placement by thoracic 7/8  --Transudative effusion, Cx negative   --CT since removed   --Repeat CT chest with small b/l partially loculated effusions, adjacent atelectasis  Bacteremia  --BC 7/9 MRSA+, repeat BC NGTD   --MOLLY with MV vegetation, not a candidate for surgery per CTS  --CT lumbar spine with no evidence of discitis   --ABX per ID  DVT prophylaxis  --AC held due to drop in H/H  --Concern for R thigh hematoma, vascular recs noted   CAD (s/p CABG) - per cards   ESRD - HD per renal   GI bleed - resolved, GI recs noted   Care per MICU team     7/24:    Acute Hypercarbic Respiratory Failure : S/p RRT, tx to MICU 7/8, intubated 7/13 s/p cardiac arrest w/ ROSC-10 mts extubated now: alert and awake:   L pleural effusion : Loculated L pleural effusion on CT chest, s/p CT placement by thoracic 7/8: Transudative effusion, Cx negative : CT since removed : Repeat CT chest with small b/l partially loculated effusions, adjacent atelectasis  Bacteremia: -serratia: now: ij new blood cultures cont antibiotics : MOLLY with MV vegetation, not a candidate for surgery per CTS  --CT lumbar spine with no evidence of discitis   --ABX per ID  DVT prophylaxis : has hematoma in adductionr muscles: AC held due to drop in H/H  CAD (s/p CABG) - per cards   ESRD - HD per renal   GI bleed - resolved, GI recs noted   Care per MICU team : della resident today     7/25:    Acute Hypercarbic Respiratory Failure : S/p RRT, tx to MICU 7/8, intubated 7/13 s/p cardiac arrest w/ ROSC-10 mts extubated now: todays he is lethargic abg being done:   L pleural effusion : Loculated L pleural effusion on CT chest, s/p CT placement by thoracic 7/8: Transudative effusion, Cx negative : CT since removed : Repeat CT chest with small b/l partially loculated effusions, adjacent atelectasis  Bacteremia: -serratia: now: ij new blood cultures cont antibiotics : MOLLY with MV vegetation, not a candidate for surgery per CTS: CT lumbar spine with no evidence of discitis : ABX per ID  DVT prophylaxis : has hematoma in adductor muscles: AC held due to drop in H/H  CAD (s/p CABG) - per cards   ESRD - HD per renal   GI bleed - resolved, GI recs noted   Care per MICU team : dw resident today too!    7/26:    Acute Hypercarbic Respiratory Failure : S/p RRT, tx to MICU 7/8, intubated 7/13 s/p cardiac arrest w/ ROSC-10 mts extubated now: todays he is lethargic abg from yesterday  noted: pretty reasonable   L pleural effusion : Loculated L pleural effusion on CT chest, s/p CT placement by thoracic 7/8: Transudative effusion, Cx negative : CT since removed : Repeat CT chest with small b/l partially loculated effusions, adjacent atelectasis  Bacteremia: -serratia: now: ij new blood cultures cont antibiotics : MOLLY with MV vegetation, not a candidate for surgery per CTS: CT lumbar spine with no evidence of discitis : ABX per ID  DVT prophylaxis : has hematoma in adductor muscles: AC held due to drop in H/H  CAD (s/p CABG) - per cards   ESRD - HD per renal   GI bleed - resolved, GI recs noted   Care per MICU team :     7/27  Acute Hypercarbic Respiratory Failure   -S/p RRT, tx to MICU 7/8, intubated 7/13 s/p cardiac arrest w/ ROSC (10 min)  -Extubated now, ABG noted  -O2 sats % on 2LNC  -Chest PT   L pleural effusion   -Loculated L pleural effusion on CT chest, s/p CT placement by thoracic 7/8 (Transudative effusion, Cx negative)  -CT since removed   -Repeat CT chest with small b/l partially loculated effusions, adjacent atelectasis  Bacteremia  -BC 7/9 MRSA+, now BC 7/24 +Serratia  -MOLLY with MV vegetation  -ABX per ID   -ID recs noted  DVT prophylaxis  -remains off AC per MICU team   CAD (s/p CABG) - per cards   ESRD - HD per renal   GI bleed - resolved, GI recs noted   Palliative care recs noted, overall prognosis guarded     7/29  Acute Hypercarbic Respiratory Failure   -S/p RRT, tx to MICU 7/8, intubated 7/13 s/p cardiac arrest w/ ROSC (10 min)  -Extubated now, now transferred from MICU to floor  -Seen on 4LNC with O2 sat 100%, can wean O2 as tolerated  -Check ABG  -Aspiration precautions  -Pending CT A/P with IV contrast, if creatinine allows can also check CT chest   L pleural effusion   -Loculated L pleural effusion on CT chest, s/p CT placement by thoracic 7/8 (Transudative effusion, Cx negative)  -CT since removed   -Repeat CT chest with small b/l partially loculated effusions, adjacent atelectasis  -F/u CT chest with contrast if able   Bacteremia  -BC 7/9 MRSA+, now BC 7/24 +Serratia  -MOLLY with MV vegetation  -ABX per ID   -ID recs noted  -F/u CT  DVT prophylaxis  -remains off AC at this time  -H/H stable, cardiology recs noted  Dysphagia   -S/S consult  -If unable to tolerate PO, f/u GOC conversation with family regarding enteral feeds  CAD (s/p CABG) - per cards   ESRD - HD per renal   Palliative care recs noted, overall prognosis guarded   D/w ACP

## 2021-07-29 NOTE — SWALLOW BEDSIDE ASSESSMENT ADULT - SWALLOW EVAL: DIAGNOSIS
1. Patient presents with mild oral dysphagia with puree, honey-thick liquids, nectar-thick liquids and thin liquids characterized by reduced attention to bolus, reduced bolus formulation, prolonged oral transit time and adequate oral clearance. 2. Patient presents with mild pharyngeal dysphagia with puree and honey-thick liquids characterized by suspected delay in pharyngeal swallow trigger and reduced hyolaryngeal elevation upon digital palpation. No overt signs or symptoms of aspiration/penetration noted. 3. Patient presents with moderate-severe pharyngeal dysphagia with nectar-thick liquids characterized by suspected delay in pharyngeal swallow trigger and reduced hyolaryngeal elevation upon digital palpation. Throat clearing noted post intake of nectar-thick liquids, suggestive of airway aspiration/penetration.
Patient consumed trials of puree presenting with oral dysphagia. Oral phase characterized by adequate utensil stripping, adequate anterior bolus containment, absent bolus manipulation with the bolus remaining in the oral cavity.  SLP and niece provided education and encouragement for patient to swallow with no change in result.  SLP removed bolus with oral swab with good result.

## 2021-07-30 LAB
ALBUMIN SERPL ELPH-MCNC: 2.5 G/DL — LOW (ref 3.3–5)
ALP SERPL-CCNC: 527 U/L — HIGH (ref 40–120)
ALT FLD-CCNC: 35 U/L — SIGNIFICANT CHANGE UP (ref 4–41)
ANION GAP SERPL CALC-SCNC: 19 MMOL/L — HIGH (ref 7–14)
AST SERPL-CCNC: 53 U/L — HIGH (ref 4–40)
BILIRUB SERPL-MCNC: 2.5 MG/DL — HIGH (ref 0.2–1.2)
BUN SERPL-MCNC: 24 MG/DL — HIGH (ref 7–23)
CALCIUM SERPL-MCNC: 9.7 MG/DL — SIGNIFICANT CHANGE UP (ref 8.4–10.5)
CHLORIDE SERPL-SCNC: 98 MMOL/L — SIGNIFICANT CHANGE UP (ref 98–107)
CO2 SERPL-SCNC: 26 MMOL/L — SIGNIFICANT CHANGE UP (ref 22–31)
CREAT SERPL-MCNC: 3.71 MG/DL — HIGH (ref 0.5–1.3)
GLUCOSE BLDC GLUCOMTR-MCNC: 110 MG/DL — HIGH (ref 70–99)
GLUCOSE BLDC GLUCOMTR-MCNC: 114 MG/DL — HIGH (ref 70–99)
GLUCOSE BLDC GLUCOMTR-MCNC: 96 MG/DL — SIGNIFICANT CHANGE UP (ref 70–99)
GLUCOSE SERPL-MCNC: 112 MG/DL — HIGH (ref 70–99)
HCT VFR BLD CALC: 33.8 % — LOW (ref 39–50)
HGB BLD-MCNC: 9.3 G/DL — LOW (ref 13–17)
MAGNESIUM SERPL-MCNC: 2.1 MG/DL — SIGNIFICANT CHANGE UP (ref 1.6–2.6)
MCHC RBC-ENTMCNC: 26.3 PG — LOW (ref 27–34)
MCHC RBC-ENTMCNC: 27.5 GM/DL — LOW (ref 32–36)
MCV RBC AUTO: 95.8 FL — SIGNIFICANT CHANGE UP (ref 80–100)
NRBC # BLD: 3 /100 WBCS — SIGNIFICANT CHANGE UP
NRBC # FLD: 0.39 K/UL — HIGH
PHOSPHATE SERPL-MCNC: 3.2 MG/DL — SIGNIFICANT CHANGE UP (ref 2.5–4.5)
PLATELET # BLD AUTO: 288 K/UL — SIGNIFICANT CHANGE UP (ref 150–400)
POTASSIUM SERPL-MCNC: 4.2 MMOL/L — SIGNIFICANT CHANGE UP (ref 3.5–5.3)
POTASSIUM SERPL-SCNC: 4.2 MMOL/L — SIGNIFICANT CHANGE UP (ref 3.5–5.3)
PROT SERPL-MCNC: 6.9 G/DL — SIGNIFICANT CHANGE UP (ref 6–8.3)
RBC # BLD: 3.53 M/UL — LOW (ref 4.2–5.8)
RBC # FLD: 20.4 % — HIGH (ref 10.3–14.5)
SODIUM SERPL-SCNC: 143 MMOL/L — SIGNIFICANT CHANGE UP (ref 135–145)
WBC # BLD: 12.27 K/UL — HIGH (ref 3.8–10.5)
WBC # FLD AUTO: 12.27 K/UL — HIGH (ref 3.8–10.5)

## 2021-07-30 PROCEDURE — 71260 CT THORAX DX C+: CPT | Mod: 26

## 2021-07-30 PROCEDURE — 74177 CT ABD & PELVIS W/CONTRAST: CPT | Mod: 26

## 2021-07-30 PROCEDURE — 99358 PROLONG SERVICE W/O CONTACT: CPT

## 2021-07-30 PROCEDURE — 99233 SBSQ HOSP IP/OBS HIGH 50: CPT | Mod: GC

## 2021-07-30 RX ORDER — PANTOPRAZOLE SODIUM 20 MG/1
40 TABLET, DELAYED RELEASE ORAL
Refills: 0 | Status: DISCONTINUED | OUTPATIENT
Start: 2021-07-30 | End: 2021-08-09

## 2021-07-30 RX ORDER — HYDROMORPHONE HYDROCHLORIDE 2 MG/ML
0.5 INJECTION INTRAMUSCULAR; INTRAVENOUS; SUBCUTANEOUS
Refills: 0 | Status: DISCONTINUED | OUTPATIENT
Start: 2021-07-30 | End: 2021-08-05

## 2021-07-30 RX ADMIN — HYDROMORPHONE HYDROCHLORIDE 0.5 MILLIGRAM(S): 2 INJECTION INTRAMUSCULAR; INTRAVENOUS; SUBCUTANEOUS at 21:04

## 2021-07-30 RX ADMIN — CEFEPIME 100 MILLIGRAM(S): 1 INJECTION, POWDER, FOR SOLUTION INTRAMUSCULAR; INTRAVENOUS at 13:40

## 2021-07-30 RX ADMIN — HYDROMORPHONE HYDROCHLORIDE 0.5 MILLIGRAM(S): 2 INJECTION INTRAMUSCULAR; INTRAVENOUS; SUBCUTANEOUS at 21:19

## 2021-07-30 RX ADMIN — CHLORHEXIDINE GLUCONATE 1 APPLICATION(S): 213 SOLUTION TOPICAL at 13:50

## 2021-07-30 RX ADMIN — PANTOPRAZOLE SODIUM 40 MILLIGRAM(S): 20 TABLET, DELAYED RELEASE ORAL at 18:26

## 2021-07-30 NOTE — PROVIDER CONTACT NOTE (OTHER) - REASON
Hypotension
Hypotensive
patient is febrile
Pt complaining of chest pain
Hypotension
Patient BP 87/51
pt c/o chest pain
sbp lower than 100 - 98/58

## 2021-07-30 NOTE — PROGRESS NOTE ADULT - PROBLEM SELECTOR PLAN 5
Sinus rhythm currently  - In setting of NPO status, switch from PO diltiazem IV lopressor 5mg for HR > 100 and/or SBP >140  - Cardiology following

## 2021-07-30 NOTE — PROGRESS NOTE ADULT - SUBJECTIVE AND OBJECTIVE BOX
Date of Service: 07-30-21 @ 15:13    Patient is a 70y old  Male who presents with a chief complaint of Chest pain and GIB (30 Jul 2021 11:05)      Any change in ROS: doing same: open eyes but at this time relative is talking to him his language but not able to respond/; keep quite:     MEDICATIONS  (STANDING):  cefepime   IVPB 1000 milliGRAM(s) IV Intermittent daily  chlorhexidine 2% Cloths 1 Application(s) Topical daily  dextrose 40% Gel 15 Gram(s) Oral once  dextrose 5%. 1000 milliLiter(s) (50 mL/Hr) IV Continuous <Continuous>  dextrose 5%. 1000 milliLiter(s) (100 mL/Hr) IV Continuous <Continuous>  dextrose 50% Injectable 12.5 Gram(s) IV Push once  dextrose 50% Injectable 25 Gram(s) IV Push once  dextrose 50% Injectable 25 Gram(s) IV Push once  epoetin danilo-epbx (RETACRIT) Injectable 43894 Unit(s) IV Push <User Schedule>  glucagon  Injectable 1 milliGRAM(s) IntraMuscular once  insulin lispro (ADMELOG) corrective regimen sliding scale   SubCutaneous every 6 hours  pantoprazole  Injectable 40 milliGRAM(s) IV Push two times a day  vancomycin  IVPB 750 milliGRAM(s) IV Intermittent <User Schedule>    MEDICATIONS  (PRN):  acetaminophen   Tablet .. 1000 milliGRAM(s) Oral every 6 hours PRN Temp greater or equal to 38C (100.4F), Mild Pain (1 - 3), Moderate Pain (4 - 6)  calamine/zinc oxide Lotion 1 Application(s) Topical three times a day PRN Itching  metoprolol tartrate Injectable 5 milliGRAM(s) IV Push every 6 hours PRN HR>100 and/or SBP>140    Vital Signs Last 24 Hrs  T(C): 36.6 (30 Jul 2021 13:50), Max: 36.9 (29 Jul 2021 22:06)  T(F): 97.8 (30 Jul 2021 13:50), Max: 98.4 (29 Jul 2021 22:06)  HR: 85 (30 Jul 2021 13:50) (85 - 95)  BP: 141/68 (30 Jul 2021 13:50) (117/76 - 141/68)  BP(mean): 82 (29 Jul 2021 22:06) (82 - 82)  RR: 20 (30 Jul 2021 13:50) (17 - 20)  SpO2: 98% (30 Jul 2021 13:50) (96% - 98%)    I&O's Summary    29 Jul 2021 07:01  -  30 Jul 2021 07:00  --------------------------------------------------------  IN: 700 mL / OUT: 1747 mL / NET: -1047 mL          Physical Exam:   GENERAL: NAD, well-groomed, well-developed  HEENT: KATHY/   Atraumatic, Normocephalic  ENMT: No tonsillar erythema, exudates, or enlargement; Moist mucous membranes, Good dentition, No lesions  NECK: Supple, No JVD, Normal thyroid  CHEST/LUNG:no wheezing but same crckles  CVS: Regular rate and rhythm; No murmurs, rubs, or gallops  GI: : Soft, Nontender, Nondistended; Bowel sounds present  NERVOUS SYSTEM:  Alert &awake:butnon verbal at [facility]  EXTREMITIES: -edema  LYMPH: No lymphadenopathy noted  SKIN: No rashes or lesions  ENDOCRINOLOGY: No Thyromegaly  PSYCH: calm    Labs:                              9.3    12.27 )-----------( 288      ( 30 Jul 2021 07:51 )             33.8                         9.2    11.57 )-----------( 259      ( 29 Jul 2021 07:09 )             31.9                         8.4    13.56 )-----------( 263      ( 28 Jul 2021 07:33 )             29.3                         8.6    11.40 )-----------( 266      ( 27 Jul 2021 03:53 )             29.6     07-30    143  |  98  |  24<H>  ----------------------------<  112<H>  4.2   |  26  |  3.71<H>  07-29    141  |  99  |  39<H>  ----------------------------<  124<H>  3.9   |  24  |  5.39<H>  07-28    139  |  100  |  28<H>  ----------------------------<  149<H>  3.6   |  24  |  4.14<H>  07-27    138  |  96<L>  |  45<H>  ----------------------------<  123<H>  4.2   |  23  |  6.08<H>    Ca    9.7      30 Jul 2021 07:51  Ca    9.4      29 Jul 2021 07:09  Phos  3.2     07-30  Phos  4.5     07-29  Mg     2.10     07-30  Mg     2.10     07-29    TPro  6.9  /  Alb  2.5<L>  /  TBili  2.5<H>  /  DBili  x   /  AST  53<H>  /  ALT  35  /  AlkPhos  527<H>  07-30  TPro  6.2  /  Alb  2.2<L>  /  TBili  2.3<H>  /  DBili  x   /  AST  58<H>  /  ALT  35  /  AlkPhos  454<H>  07-29  TPro  6.2  /  Alb  2.3<L>  /  TBili  2.0<H>  /  DBili  x   /  AST  67<H>  /  ALT  32  /  AlkPhos  340<H>  07-27    CAPILLARY BLOOD GLUCOSE      POCT Blood Glucose.: 96 mg/dL (30 Jul 2021 13:47)  POCT Blood Glucose.: 114 mg/dL (30 Jul 2021 06:47)  POCT Blood Glucose.: 126 mg/dL (29 Jul 2021 23:24)  POCT Blood Glucose.: 115 mg/dL (29 Jul 2021 17:35)      LIVER FUNCTIONS - ( 30 Jul 2021 07:51 )  Alb: 2.5 g/dL / Pro: 6.9 g/dL / ALK PHOS: 527 U/L / ALT: 35 U/L / AST: 53 U/L / GGT: x                     RECENT CULTURES:  07-29 @ 14:39 .Blood Blood-Peripheral                No growth to date.    07-29 @ 10:31 .Blood Blood-Peripheral         < from: CT Chest w/ IV Cont (07.30.21 @ 12:10) >    CONTRAST/COMPLICATIONS:  IV Contrast: Omnipaque 350  90 cc administered   10 cc discarded  Oral Contrast: NONE  Complications: None reported at time of study completion    PROCEDURE:  CT of the Chest, Abdomen and Pelvis was performed.  Sagittal and coronal reformats were performed.    FINDINGS:  CHEST:  LUNGS AND LARGE AIRWAYS: Patent central airways. Improved aeration of the right lung base. Basilar passive atelectasis, left greater than right. Focal region of hypoenhancement in the compressed left lung (2:33), concerning for pneumonia.  PLEURA: Moderate left pleural effusion. Right pleural effusion has resolved.  VESSELS: Left subclavian venous stent. A linear filling defect in the left internal jugular vein measuring 3 mm in diameter likely reflects residual fibrin sheath from previously present left internal jugular central venous line.  HEART: Enlarged heart. No pericardial effusion.  MEDIASTINUM AND YOANA: No lymphadenopathy.  CHEST WALL AND LOWER NECK: Small partially calcified right thyroid nodule.    ABDOMEN AND PELVIS:  LIVER: Within normal limits.  BILE DUCTS: Normal caliber.  GALLBLADDER: Within normal limits.  SPLEEN: Normal size. New wedge shaped hypodensity in the spleen, concerning for splenic infarct.  PANCREAS: Within normal limits.  ADRENALS: Within normal limits.  KIDNEYS/URETERS: Bilateral renal cortical thinning. No hydronephrosis. Subcentimeter left renal hypodensity, likely a cyst.    BLADDER: Within normal limits.  REPRODUCTIVE ORGANS: Prostate within normal limits.    BOWEL: No bowel obstruction. Appendix is normal.  PERITONEUM: Trace ascites.  VESSELS: Atherosclerotic calcifications.  RETROPERITONEUM/LYMPH NODES: No lymphadenopathy.  ABDOMINAL WALL: Anasarca. Partially imaged right medial thigh hematoma correspond with findings present on prior examination 7/21/2021.  BONES: Within normal limits.    IMPRESSION:  *  Moderate left pleural effusion and bibasilar atelectasis, left greater than right.  *  Focal hypoenhancement and a portion of the atelectatic lung, concerning for superimposed pneumonia.  *  Partially imaged right medial thigh hematoma, corresponding with findings present on prior examination 7/21/2021.    --- End of Report ---              BENJAMIN GARCIA MD; Attending Radiologist  This document has been electronically signed. Jul 30 2021 12:48PM    < end of copied text >         No growth to date.    07-24 @ 15:21 .Blood Blood-Peripheral                No Growth Final    07-24 @ 14:28 .Blood Blood-Peripheral       Growth in aerobic bottle: Gram Negative Rods  Growth in anaerobic bottle: Gram Negative Rods           Growth in aerobic and anaerobic bottles: Serratia marcescens  See previous culture [id number]          RESPIRATORY CULTURES:          Studies  Chest X-RAY  CT SCAN Chest   Venous Dopplers: LE:   CT Abdomen  Others

## 2021-07-30 NOTE — PROGRESS NOTE ADULT - ASSESSMENT
71 yo M with PMhx of CAD s/p CABG (course at that time complicated by pleural effusion requiring chest tubes), ESRD on HD (TTS), DMII, HTN, afib on coumadin, and anemia presented from [facility] for evaluation of chest pain and GIB. Renal following for ESRD Mx. s/p cardiac arrest. CTH showed acute infarcts. Further w/u, MRSA bacteremia, TRUMAN showed MR vegetation.     ESRD  Maintenance schedule TTS  Improved volume status.   Pt had HD yesterday. Pall f/u note appreciated   Pts son does not wish to continue further HD treatments, instead focusing on patient comfort.  Will not schedule any more HD treatments.   Currently on pleasure feeds.     Rudy Mendiola MD  [facility]  Office [phone]  Ans Serv [phone]  [facility] [phone]

## 2021-07-30 NOTE — PROGRESS NOTE ADULT - SUBJECTIVE AND OBJECTIVE BOX
Medicine Progress Note    Patient is a 70y old  Male who presents with a chief complaint of Chest pain and GIB (29 Jul 2021 13:34)      SUBJECTIVE / OVERNIGHT EVENTS:    ADDITIONAL REVIEW OF SYSTEMS:    MEDICATIONS  (STANDING):  atorvastatin 40 milliGRAM(s) Oral at bedtime  cefepime   IVPB 1000 milliGRAM(s) IV Intermittent daily  chlorhexidine 2% Cloths 1 Application(s) Topical daily  dextrose 40% Gel 15 Gram(s) Oral once  dextrose 5%. 1000 milliLiter(s) (50 mL/Hr) IV Continuous <Continuous>  dextrose 5%. 1000 milliLiter(s) (100 mL/Hr) IV Continuous <Continuous>  dextrose 50% Injectable 12.5 Gram(s) IV Push once  dextrose 50% Injectable 25 Gram(s) IV Push once  dextrose 50% Injectable 25 Gram(s) IV Push once  epoetin danilo-epbx (RETACRIT) Injectable 36018 Unit(s) IV Push <User Schedule>  glucagon  Injectable 1 milliGRAM(s) IntraMuscular once  insulin lispro (ADMELOG) corrective regimen sliding scale   SubCutaneous every 6 hours  pantoprazole    Tablet 40 milliGRAM(s) Oral two times a day  vancomycin  IVPB 750 milliGRAM(s) IV Intermittent <User Schedule>    MEDICATIONS  (PRN):  acetaminophen   Tablet .. 1000 milliGRAM(s) Oral every 6 hours PRN Temp greater or equal to 38C (100.4F), Mild Pain (1 - 3), Moderate Pain (4 - 6)  calamine/zinc oxide Lotion 1 Application(s) Topical three times a day PRN Itching  metoprolol tartrate Injectable 5 milliGRAM(s) IV Push every 6 hours PRN HR>100 and/or SBP>140    CAPILLARY BLOOD GLUCOSE      POCT Blood Glucose.: 114 mg/dL (30 Jul 2021 06:47)  POCT Blood Glucose.: 126 mg/dL (29 Jul 2021 23:24)  POCT Blood Glucose.: 115 mg/dL (29 Jul 2021 17:35)  POCT Blood Glucose.: 112 mg/dL (29 Jul 2021 12:09)  POCT Blood Glucose.: 107 mg/dL (29 Jul 2021 09:03)    I&O's Summary    29 Jul 2021 07:01  -  30 Jul 2021 07:00  --------------------------------------------------------  IN: 700 mL / OUT: 1747 mL / NET: -1047 mL        PHYSICAL EXAM:  Vital Signs Last 24 Hrs  T(C): 36.8 (30 Jul 2021 07:00), Max: 36.9 (29 Jul 2021 22:06)  T(F): 98.2 (30 Jul 2021 07:00), Max: 98.4 (29 Jul 2021 22:06)  HR: 95 (30 Jul 2021 07:00) (76 - 95)  BP: 122/64 (30 Jul 2021 07:00) (103/68 - 185/76)  BP(mean): 82 (29 Jul 2021 22:06) (82 - 82)  RR: 17 (30 Jul 2021 07:00) (17 - 20)  SpO2: 96% (30 Jul 2021 07:00) (96% - 96%)  CONSTITUTIONAL: NAD, well-developed, well-groomed  ENMT: Moist oral mucosa, no pharyngeal injection or exudates; normal dentition  RESPIRATORY: Normal respiratory effort; lungs are clear to auscultation bilaterally  CARDIOVASCULAR: Regular rate and rhythm, normal S1 and S2, no murmur/rub/gallop; No lower extremity edema; Peripheral pulses are 2+ bilaterally  ABDOMEN: Nontender to palpation, normoactive bowel sounds, no rebound/guarding; No hepatosplenomegaly  PSYCH: A+O to person, place, and time; affect appropriate  NEUROLOGY: CN 2-12 are intact and symmetric; no gross sensory deficits   SKIN: No rashes; no palpable lesions    LABS:                        9.3    12.27 )-----------( 288      ( 30 Jul 2021 07:51 )             33.8     07-29    141  |  99  |  39<H>  ----------------------------<  124<H>  3.9   |  24  |  5.39<H>    Ca    9.4      29 Jul 2021 07:09  Phos  4.5     07-29  Mg     2.10     07-29    TPro  6.2  /  Alb  2.2<L>  /  TBili  2.3<H>  /  DBili  x   /  AST  58<H>  /  ALT  35  /  AlkPhos  454<H>  07-29              COVID-19 PCR: NotDetec (04 Jul 2021 11:15)      RADIOLOGY & ADDITIONAL TESTS:  Imaging from Last 24 Hours:    Electrocardiogram/QTc Interval:    COORDINATION OF CARE:  Care Discussed with Consultants/Other Providers:   Medicine Progress Note    Abdias Moreno, MS4  Team 8  #[id number]    Patient is a 70y old  Male who presents with a chief complaint of Chest pain and GIB (29 Jul 2021 13:34)      SUBJECTIVE / OVERNIGHT EVENTS: Patient seen and examined at bedside. No acute events overnight per nursing. Patient this morning appeared mildly more alert when examined. Still unable to meaningfully communicate what he's feeling at present time.       MEDICATIONS  (STANDING):  atorvastatin 40 milliGRAM(s) Oral at bedtime  cefepime   IVPB 1000 milliGRAM(s) IV Intermittent daily  chlorhexidine 2% Cloths 1 Application(s) Topical daily  dextrose 40% Gel 15 Gram(s) Oral once  dextrose 5%. 1000 milliLiter(s) (50 mL/Hr) IV Continuous <Continuous>  dextrose 5%. 1000 milliLiter(s) (100 mL/Hr) IV Continuous <Continuous>  dextrose 50% Injectable 12.5 Gram(s) IV Push once  dextrose 50% Injectable 25 Gram(s) IV Push once  dextrose 50% Injectable 25 Gram(s) IV Push once  epoetin danilo-epbx (RETACRIT) Injectable 12423 Unit(s) IV Push <User Schedule>  glucagon  Injectable 1 milliGRAM(s) IntraMuscular once  insulin lispro (ADMELOG) corrective regimen sliding scale   SubCutaneous every 6 hours  pantoprazole    Tablet 40 milliGRAM(s) Oral two times a day  vancomycin  IVPB 750 milliGRAM(s) IV Intermittent <User Schedule>    MEDICATIONS  (PRN):  acetaminophen   Tablet .. 1000 milliGRAM(s) Oral every 6 hours PRN Temp greater or equal to 38C (100.4F), Mild Pain (1 - 3), Moderate Pain (4 - 6)  calamine/zinc oxide Lotion 1 Application(s) Topical three times a day PRN Itching  metoprolol tartrate Injectable 5 milliGRAM(s) IV Push every 6 hours PRN HR>100 and/or SBP>140    CAPILLARY BLOOD GLUCOSE      POCT Blood Glucose.: 114 mg/dL (30 Jul 2021 06:47)  POCT Blood Glucose.: 126 mg/dL (29 Jul 2021 23:24)  POCT Blood Glucose.: 115 mg/dL (29 Jul 2021 17:35)  POCT Blood Glucose.: 112 mg/dL (29 Jul 2021 12:09)  POCT Blood Glucose.: 107 mg/dL (29 Jul 2021 09:03)    I&O's Summary    29 Jul 2021 07:01  -  30 Jul 2021 07:00  --------------------------------------------------------  IN: 700 mL / OUT: 1747 mL / NET: -1047 mL        PHYSICAL EXAM:  Vital Signs Last 24 Hrs  T(C): 36.8 (30 Jul 2021 07:00), Max: 36.9 (29 Jul 2021 22:06)  T(F): 98.2 (30 Jul 2021 07:00), Max: 98.4 (29 Jul 2021 22:06)  HR: 95 (30 Jul 2021 07:00) (76 - 95)  BP: 122/64 (30 Jul 2021 07:00) (103/68 - 185/76)  BP(mean): 82 (29 Jul 2021 22:06) (82 - 82)  RR: 17 (30 Jul 2021 07:00) (17 - 20)  SpO2: 96% (30 Jul 2021 07:00) (96% - 96%)  CONSTITUTIONAL: Man laying in bed in NAD  ENMT: Moist oral mucosa, no pharyngeal injection or exudates, anicteric sclera  RESPIRATORY: diminished respiratory effort; lungs are clear to auscultation bilaterally  CARDIOVASCULAR: Regular rate and rhythm, normal S1 and S2, no murmur/rub/gallop; LE pitting edema b/l at level of feet, mild pretibial, R > L side; Peripheral pulses are 1+ bilaterally  ABDOMEN: Soft, nontender to palpation, normoactive bowel sounds, no rebound/guarding; No hepatosplenomegaly  NEUROLOGY: Patient able to open eyes occasionally to name being called and will attend to voice better than on previous exams.  LABS:                        9.3    12.27 )-----------( 288      ( 30 Jul 2021 07:51 )             33.8     07-29    141  |  99  |  39<H>  ----------------------------<  124<H>  3.9   |  24  |  5.39<H>    Ca    9.4      29 Jul 2021 07:09  Phos  4.5     07-29  Mg     2.10     07-29    TPro  6.2  /  Alb  2.2<L>  /  TBili  2.3<H>  /  DBili  x   /  AST  58<H>  /  ALT  35  /  AlkPhos  454<H>  07-29              COVID-19 PCR: NotDetec (04 Jul 2021 11:15)      RADIOLOGY & ADDITIONAL TESTS:  Imaging from Last 24 Hours: None Medicine Progress Note    Abdias Moreno, MS4  Team 8  #[id number]    Patient is a 70y old  Male who presents with a chief complaint of Chest pain and GIB (29 Jul 2021 13:34)      SUBJECTIVE / OVERNIGHT EVENTS: Patient seen and examined at bedside. No acute events overnight per nursing. Patient this morning appeared mildly more alert when examined. Still unable to meaningfully communicate what he's feeling at present time.       MEDICATIONS  (STANDING):  atorvastatin 40 milliGRAM(s) Oral at bedtime  cefepime   IVPB 1000 milliGRAM(s) IV Intermittent daily  chlorhexidine 2% Cloths 1 Application(s) Topical daily  dextrose 40% Gel 15 Gram(s) Oral once  dextrose 5%. 1000 milliLiter(s) (50 mL/Hr) IV Continuous <Continuous>  dextrose 5%. 1000 milliLiter(s) (100 mL/Hr) IV Continuous <Continuous>  dextrose 50% Injectable 12.5 Gram(s) IV Push once  dextrose 50% Injectable 25 Gram(s) IV Push once  dextrose 50% Injectable 25 Gram(s) IV Push once  epoetin danilo-epbx (RETACRIT) Injectable 44798 Unit(s) IV Push <User Schedule>  glucagon  Injectable 1 milliGRAM(s) IntraMuscular once  insulin lispro (ADMELOG) corrective regimen sliding scale   SubCutaneous every 6 hours  pantoprazole    Tablet 40 milliGRAM(s) Oral two times a day  vancomycin  IVPB 750 milliGRAM(s) IV Intermittent <User Schedule>    MEDICATIONS  (PRN):  acetaminophen   Tablet .. 1000 milliGRAM(s) Oral every 6 hours PRN Temp greater or equal to 38C (100.4F), Mild Pain (1 - 3), Moderate Pain (4 - 6)  calamine/zinc oxide Lotion 1 Application(s) Topical three times a day PRN Itching  metoprolol tartrate Injectable 5 milliGRAM(s) IV Push every 6 hours PRN HR>100 and/or SBP>140    CAPILLARY BLOOD GLUCOSE      POCT Blood Glucose.: 114 mg/dL (30 Jul 2021 06:47)  POCT Blood Glucose.: 126 mg/dL (29 Jul 2021 23:24)  POCT Blood Glucose.: 115 mg/dL (29 Jul 2021 17:35)  POCT Blood Glucose.: 112 mg/dL (29 Jul 2021 12:09)  POCT Blood Glucose.: 107 mg/dL (29 Jul 2021 09:03)    I&O's Summary    29 Jul 2021 07:01  -  30 Jul 2021 07:00  --------------------------------------------------------  IN: 700 mL / OUT: 1747 mL / NET: -1047 mL        PHYSICAL EXAM:  Vital Signs Last 24 Hrs  T(C): 36.8 (30 Jul 2021 07:00), Max: 36.9 (29 Jul 2021 22:06)  T(F): 98.2 (30 Jul 2021 07:00), Max: 98.4 (29 Jul 2021 22:06)  HR: 95 (30 Jul 2021 07:00) (76 - 95)  BP: 122/64 (30 Jul 2021 07:00) (103/68 - 185/76)  BP(mean): 82 (29 Jul 2021 22:06) (82 - 82)  RR: 17 (30 Jul 2021 07:00) (17 - 20)  SpO2: 96% (30 Jul 2021 07:00) (96% - 96%)  CONSTITUTIONAL: Man laying in bed in NAD  ENMT: Moist oral mucosa, no pharyngeal injection or exudates, anicteric sclera  RESPIRATORY: diminished respiratory effort; lungs are clear to auscultation bilaterally  CARDIOVASCULAR: Regular rate and rhythm, normal S1 and S2, no murmur/rub/gallop; LE pitting edema b/l at level of feet, mild pretibial, R > L side; Peripheral pulses are 1+ bilaterally  ABDOMEN: Soft, nontender to palpation, normoactive bowel sounds, no rebound/guarding; No hepatosplenomegaly  NEUROLOGY: Patient able to open eyes occasionally to name being called and will attend to voice better than on previous exams. Unable to assess strength    LABS:                        9.3    12.27 )-----------( 288      ( 30 Jul 2021 07:51 )             33.8     07-29    141  |  99  |  39<H>  ----------------------------<  124<H>  3.9   |  24  |  5.39<H>    Ca    9.4      29 Jul 2021 07:09  Phos  4.5     07-29  Mg     2.10     07-29    TPro  6.2  /  Alb  2.2<L>  /  TBili  2.3<H>  /  DBili  x   /  AST  58<H>  /  ALT  35  /  AlkPhos  454<H>  07-29              COVID-19 PCR: NotDetec (04 Jul 2021 11:15)      RADIOLOGY & ADDITIONAL TESTS:  Imaging from Last 24 Hours: None

## 2021-07-30 NOTE — PROGRESS NOTE ADULT - PROBLEM SELECTOR PLAN 1
Patient with blood cultures + for MRSA and Serratia  - C/W Vanc 750mg prior to HD  - C/W Cefepime 1g qd  - Cultures scheduled for today to monitor for clearance  - F/U CT A/P and chest Patient with blood cultures + for MRSA and Serratia  - C/W Vanc 750mg prior to HD  - C/W Cefepime 1g qd  - F/U cultures from 7/29/21  - Cultures scheduled for today (7/30/21) to monitor for clearance  - F/U CT A/P and chest Patient with blood cultures + for MRSA and Serratia  - C/W Vanc 750mg prior to HD  - C/W Cefepime 1g qd  - Cultures from 7/29/21; NGTD  - Cultures scheduled for today (7/30/21) to monitor for clearance  - F/U CT A/P and chest

## 2021-07-30 NOTE — CHART NOTE - NSCHARTNOTEFT_GEN_A_CORE
************************************************************************  PALLIATIVE MEDICINE COORDINATION OF CARE NOTE FOR VIOLETTA JAMESYVAN  [x] Inpatient Consult  [ ] Other:  ************************************************************************  SUMMARY OF RECOMMENDATIONS:    > Chart reviewed. Discussed with IDT.   > SYMPTOM MANAGEMENT: abdominal pain   > GOALS OF CARE: Spoke to patient's son, Randal, who explained that he does not want his father to have further HD, focus on comfort care, no more blood draws, symptom-directed care, no telemetry monitoring, no artificial nutrition, continue with pleasure feeds. Randal kindly asked if patient could be switched to a private room so that his family could have private end of life visits. Additionally, he asked if medical providers could not share this plan with patient's wife, as he would like to speak to his mother first.   > CODE STATUS: DNR/DNI    > HCP/ SURROGATE: Per chart review, Randal (son [id number]) is surrogate decision maker.   > Communicated with primary team. We will continue to follow with you.     ************************************************************************  CHART REVIEW: Chart reviewed. Spoke to patient's son, Randal, via telephone who explained that he would like his father to receive "one more HD session tomorrow if he can tolerate     HPI:  71 yo M with PMhx of CAD s/p CABG (course at that time complicated by pleural effusion requiring chest tubes), ESRD on HD (MWF), DMII, HTN, afib on coumadin, and anemia presented from [facility] for evaluation of chest pain and GIB. Patient is AAOX2 and was able to provide some information. Most of the information was obtained from charting. Patient was admitted to [facility] after developing chest pain during HD. He was found to have Hgb of 6 and elevated trops concerning for GIB and NSTEM II. He was give 2 units of pRBCs and monitored off warfarin/aspirin. He underwent bowel prep but it did not show any bloody stool. As a result, no colonoscopy was performed. His course was complicated by aflutter/afib with RVR. He was managed with metoprolol and never required cardioversion. On day of transfer, his Hgb was in the 8s. Patient was deemed stable enough to be restarted on aspirin and warfarin. Patient was transferred to [facility] for further evaluation. Currently, patient have no symptoms but reports to have intermittent chest pain, that is suprasternal, non-radiating, exertional at times, and non-reproducible on palpation. it improves with rest and worsens with movement. He denies any fever, chills, cough, orthopnea, PND, LE edema, abdominal pain, diarrhea, or dysuria.    On the floor, vitals are WNL.  (03 Jul 2021 21:11)      MEDICATIONS  (STANDING):  cefepime   IVPB 1000 milliGRAM(s) IV Intermittent daily  chlorhexidine 2% Cloths 1 Application(s) Topical daily  dextrose 40% Gel 15 Gram(s) Oral once  dextrose 5%. 1000 milliLiter(s) (50 mL/Hr) IV Continuous <Continuous>  dextrose 5%. 1000 milliLiter(s) (100 mL/Hr) IV Continuous <Continuous>  dextrose 50% Injectable 12.5 Gram(s) IV Push once  dextrose 50% Injectable 25 Gram(s) IV Push once  dextrose 50% Injectable 25 Gram(s) IV Push once  epoetin danilo-epbx (RETACRIT) Injectable 31963 Unit(s) IV Push <User Schedule>  glucagon  Injectable 1 milliGRAM(s) IntraMuscular once  insulin lispro (ADMELOG) corrective regimen sliding scale   SubCutaneous every 6 hours  pantoprazole  Injectable 40 milliGRAM(s) IV Push two times a day  vancomycin  IVPB 750 milliGRAM(s) IV Intermittent <User Schedule>    MEDICATIONS  (PRN):  acetaminophen   Tablet .. 1000 milliGRAM(s) Oral every 6 hours PRN Temp greater or equal to 38C (100.4F), Mild Pain (1 - 3), Moderate Pain (4 - 6)  calamine/zinc oxide Lotion 1 Application(s) Topical three times a day PRN Itching  metoprolol tartrate Injectable 5 milliGRAM(s) IV Push every 6 hours PRN HR>100 and/or SBP>140          ************************************************************************  MEDICATION REVIEW:  --- Pls refer to current medicatons in the body of this note  --- PRN usage: None, would start IV dilaudid 0.5mg q2hrs prn pain or dyspnea   -----------------------------------------------------------------------  COORDINATION OF CARE:  --- Palliative Care consulted for: goc, sx management in setting of EOL, no further HD  --- Patient assessed: Yes  --- Patient previously seen by Palliative Care service: Yes   ADVANCE CARE PLANNING  --- Code status: DNR/DNI, no artificial nutrition, symptom-directed care, no further blood draws, d/c telemetry monitoring and pulse ox, no further HD  --- MOLST reviewed in chart: NONE  --- HCP/ Surrogate: Per chart review, Randal (son [id number]) is surrogate decision maker.   --- GOC document found in Alpha: NONE  --- HCP/ Living will/ Other advanced directives in Alpha: NONE  CARE PROVIDER DOCUMENTATION:  --- SW/CM notes: last note on 7/29: Pt transfer from 9S covered by team 8, per provider Abdias #[id number] pt is medically active on IV/Abx for bacteremia, receiving HD treatment T, TH, Sat ,awaiting CT/chest and abd.  Pt is NPO awaiting family GOC meeting to discuss feeding option. CM remain available and continue to coordinate with interdisciplinary team for final discharge plan.  --- PT recs: n/a  --- Sp/Sw recs:  pleasure feeds   --- Nutrition recs: pleasure feeds   PLAN OF CARE  --- Known admissions in past year: 1  --- Current admit date: 7/3/21   --- LOS:27  --- LACE score: 13  --- Current dispo plan: TO BE DETERMINED  ------------------------------------------------------------------------  --- Chart reviewed: 60 Minutes [including time used to gather, review and transfer data to this note]  --- Start: 3PM  --- End: 4PM  Prolonged services rendered, as part of this patient's care provided by Palliative Medicine, include: i.chart review for provider and ancillary service documentation, ii.pertinent diagnostics including laboratory and imaging studies,iii. medication review including PRN use, iv. admission history including previous palliative care encounters and GOC notes, v.advance care planning documents including HCP and MOLST forms in Alpha. Part of Palliative Medicine extended evaluation and management also involves coordination of care with our IDT, the primary and consulting rani, and unit CM/SW and Hospice if eligible. Recommendations based on the information gathered and discussed are outline in the AP of our notes.    ************************************************************************  Care coordination of VIOLETTA RENTERIA was communicated with the interdisciplinary team during IDT rounds and with the primary team. ************************************************************************  PALLIATIVE MEDICINE COORDINATION OF CARE NOTE FOR VIOLETTA JAMESYVAN  [x] Inpatient Consult  [ ] Other:  ************************************************************************  SUMMARY OF RECOMMENDATIONS:    > Chart reviewed. Discussed with IDT.   > SYMPTOM MANAGEMENT: abdominal pain   > GOALS OF CARE: Spoke to patient's son, Randal, who explained that he does not want his father to have further HD, focus on comfort care, no more blood draws, symptom-directed care, no telemetry monitoring, stop unnecessary medications no artificial nutrition, continue with pleasure feeds. Randal kindly asked if patient could be switched to a private room so that his family could have private end of life visits. Additionally, he asked if medical providers could not share this plan with patient's wife, as he would like to speak to his mother first.   > CODE STATUS: DNR/DNI    > HCP/ SURROGATE: Per chart review, Randal (son 917) is surrogate decision maker.   > Communicated with primary team. We will continue to follow with you.     ************************************************************************  CHART REVIEW: Chart reviewed. Spoke to patient's son, Randal, via telephone who explained that he would like his father to receive "one more HD session tomorrow if he can tolerate     HPI:  69 yo M with PMhx of CAD s/p CABG (course at that time complicated by pleural effusion requiring chest tubes), ESRD on HD (MWF), DMII, HTN, afib on coumadin, and anemia presented from [facility] for evaluation of chest pain and GIB. Patient is AAOX2 and was able to provide some information. Most of the information was obtained from charting. Patient was admitted to [facility] after developing chest pain during HD. He was found to have Hgb of 6 and elevated trops concerning for GIB and NSTEM II. He was give 2 units of pRBCs and monitored off warfarin/aspirin. He underwent bowel prep but it did not show any bloody stool. As a result, no colonoscopy was performed. His course was complicated by aflutter/afib with RVR. He was managed with metoprolol and never required cardioversion. On day of transfer, his Hgb was in the 8s. Patient was deemed stable enough to be restarted on aspirin and warfarin. Patient was transferred to [facility] for further evaluation. Currently, patient have no symptoms but reports to have intermittent chest pain, that is suprasternal, non-radiating, exertional at times, and non-reproducible on palpation. it improves with rest and worsens with movement. He denies any fever, chills, cough, orthopnea, PND, LE edema, abdominal pain, diarrhea, or dysuria.    On the floor, vitals are WNL.  (03 Jul 2021 21:11)      MEDICATIONS  (STANDING):  cefepime   IVPB 1000 milliGRAM(s) IV Intermittent daily  chlorhexidine 2% Cloths 1 Application(s) Topical daily  dextrose 40% Gel 15 Gram(s) Oral once  dextrose 5%. 1000 milliLiter(s) (50 mL/Hr) IV Continuous <Continuous>  dextrose 5%. 1000 milliLiter(s) (100 mL/Hr) IV Continuous <Continuous>  dextrose 50% Injectable 12.5 Gram(s) IV Push once  dextrose 50% Injectable 25 Gram(s) IV Push once  dextrose 50% Injectable 25 Gram(s) IV Push once  epoetin danilo-epbx (RETACRIT) Injectable 98212 Unit(s) IV Push <User Schedule>  glucagon  Injectable 1 milliGRAM(s) IntraMuscular once  insulin lispro (ADMELOG) corrective regimen sliding scale   SubCutaneous every 6 hours  pantoprazole  Injectable 40 milliGRAM(s) IV Push two times a day  vancomycin  IVPB 750 milliGRAM(s) IV Intermittent <User Schedule>    MEDICATIONS  (PRN):  acetaminophen   Tablet .. 1000 milliGRAM(s) Oral every 6 hours PRN Temp greater or equal to 38C (100.4F), Mild Pain (1 - 3), Moderate Pain (4 - 6)  calamine/zinc oxide Lotion 1 Application(s) Topical three times a day PRN Itching  metoprolol tartrate Injectable 5 milliGRAM(s) IV Push every 6 hours PRN HR>100 and/or SBP>140          ************************************************************************  MEDICATION REVIEW:  --- Pls refer to current medicatons in the body of this note  --- PRN usage: None, would start IV dilaudid 0.5mg q2hrs prn pain or dyspnea   -----------------------------------------------------------------------  COORDINATION OF CARE:  --- Palliative Care consulted for: goc, sx management in setting of EOL, no further HD  --- Patient assessed: Yes  --- Patient previously seen by Palliative Care service: Yes   ADVANCE CARE PLANNING  --- Code status: DNR/DNI, no artificial nutrition, symptom-directed care, no further blood draws, d/c telemetry monitoring and pulse ox, no further HD, stop unnecessary medications   --- MOLST reviewed in chart: NONE  --- HCP/ Surrogate: Per chart review, Randal (son 917) is surrogate decision maker.   --- GOC document found in Alpha: NONE  --- HCP/ Living will/ Other advanced directives in Alpha: NONE  CARE PROVIDER DOCUMENTATION:  --- SW/CM notes: last note on 7/29: Pt transfer from 9S covered by team 8, per provider Abdias #[id number] pt is medically active on IV/Abx for bacteremia, receiving HD treatment T, TH, Sat ,awaiting CT/chest and abd.  Pt is NPO awaiting family GOC meeting to discuss feeding option. CM remain available and continue to coordinate with interdisciplinary team for final discharge plan.  --- PT recs: n/a  --- Sp/Sw recs:  pleasure feeds   --- Nutrition recs: pleasure feeds   PLAN OF CARE  --- Known admissions in past year: 1  --- Current admit date: 7/3/21   --- LOS:27  --- LACE score: 13  --- Current dispo plan: TO BE DETERMINED  ------------------------------------------------------------------------  --- Chart reviewed: 60 Minutes [including time used to gather, review and transfer data to this note]  --- Start: 3PM  --- End: 4PM  Prolonged services rendered, as part of this patient's care provided by Palliative Medicine, include: i.chart review for provider and ancillary service documentation, ii.pertinent diagnostics including laboratory and imaging studies,iii. medication review including PRN use, iv. admission history including previous palliative care encounters and GOC notes, v.advance care planning documents including HCP and MOLST forms in Alpha. Part of Palliative Medicine extended evaluation and management also involves coordination of care with our IDT, the primary and consulting rani, and unit CM/SW and Hospice if eligible. Recommendations based on the information gathered and discussed are outline in the AP of our notes.    ************************************************************************  Care coordination of VIOLETTA RENTERIA was communicated with the interdisciplinary team during IDT rounds and with the primary team.

## 2021-07-30 NOTE — PROGRESS NOTE ADULT - SUBJECTIVE AND OBJECTIVE BOX
Nephrology Followup Note - [phone] - Dr Mendiola / Dr Knight / Dr Macias / Dr Arroyo / Dr Mackey / Dr Bowen / Dr Esposito / Dr Calle  Pt seen and examined at bedside  No acute events overnight. Pt confused, awake, comfortable.     Allergies:  lisinopril (Other)  opioid-like analgesics (Other)    Hospital Medications:   MEDICATIONS  (STANDING):  cefepime   IVPB 1000 milliGRAM(s) IV Intermittent daily  chlorhexidine 2% Cloths 1 Application(s) Topical daily  pantoprazole  Injectable 40 milliGRAM(s) IV Push two times a day  vancomycin  IVPB 750 milliGRAM(s) IV Intermittent <User Schedule>    VITALS:  T(F): 97.8 (07-30-21 @ 13:50), Max: 98.4 (07-29-21 @ 22:06)  HR: 85 (07-30-21 @ 13:50)  BP: 141/68 (07-30-21 @ 13:50)  RR: 20 (07-30-21 @ 13:50)  SpO2: 98% (07-30-21 @ 13:50)  Wt(kg): --    07-29 @ 07:01  -  07-30 @ 07:00  --------------------------------------------------------  IN: 700 mL / OUT: 1747 mL / NET: -1047 mL        PHYSICAL EXAM:  Constitutional: NAD  HEENT: anicteric sclera, oropharynx clear, MMM  Neck: No JVD  Respiratory: CTAB, no wheezes, rales or rhonchi  Cardiovascular: S1, S2, RRR  Gastrointestinal: BS+, soft, NT/ND  Extremities: No cyanosis or clubbing. Trace peripheral edema  Neurological: A/O x0  : No CVA tenderness. No wagner.   Skin: No rashes  Vascular Access: UE AV access +thrill and bruit.     LABS:  07-30    143  |  98  |  24<H>  ----------------------------<  112<H>  4.2   |  26  |  3.71<H>    Ca    9.7      30 Jul 2021 07:51  Phos  3.2     07-30  Mg     2.10     07-30    TPro  6.9  /  Alb  2.5<L>  /  TBili  2.5<H>  /  DBili      /  AST  53<H>  /  ALT  35  /  AlkPhos  527<H>  07-30    Creatinine Trend: 3.71 <--, 5.39 <--, 4.14 <--, 6.08 <--, 5.06 <--, 4.13 <--, 5.33 <--                        9.3    12.27 )-----------( 288      ( 30 Jul 2021 07:51 )             33.8     Urine Studies:      RADIOLOGY & ADDITIONAL STUDIES:

## 2021-07-30 NOTE — PROVIDER CONTACT NOTE (OTHER) - SITUATION
Patient BP 87/51
sbp lower than 100 - 98/58
patient c/o generalized chest pain, pts family members at bedside.
patient's rectal temp is 101.9
Hypotensive- Blood pressure 77/45
Manual BP 83/50
Patient hypotensive post echo. 80/51, complaining of dizziness.
Pt complaining of chest pain

## 2021-07-30 NOTE — PROVIDER CONTACT NOTE (OTHER) - BACKGROUND
BP was stable 3 hours ago however now hypotensive again
Patient has been hypotensive, patient is S/P bedside placement of Left chest tube
Patient admitted for HF. PMH of DM, ESRD, hypertension, CAD.
Patient went for HD earlier in the day, dizziness is not new.
Pt transferred from MICU Pt came in for heart disease
heart disease
pt transferred from micu for heart failure
(Admit Diagnosis) Heart disease  (PMH) ESRD (end stage renal disease) on dialysis  (PMH) Coronary Stent  (PMH) Stented coronary artery

## 2021-07-30 NOTE — PROVIDER CONTACT NOTE (OTHER) - ASSESSMENT
Patient is awake and alert able to follow commands
Patient otherwise mentating well and stable
patient is shivering
Asymptomatic
Pt nonverbal,
patient c/o generalized chest pain, pts family members at bedside.  patients vitals all within normal limits
Patient A&Ox3, VS as documented. Patient denies chest pain and SOB. Patient is asymptomatic.
bp taken before administering medication

## 2021-07-30 NOTE — PROVIDER CONTACT NOTE (OTHER) - RECOMMENDATIONS
give PRN for pain, family members requesting no EKG at this time.
Midodrine and fluids
awaiting orders
Patient to be lying down in bed, please give 250 bolus and midodrine as ordered.
Dr. Neville notified, recommended to come see patient
continue to monitor

## 2021-07-30 NOTE — PROVIDER CONTACT NOTE (OTHER) - NAME OF MD/NP/PA/DO NOTIFIED:
ACP Karthik Medley
ROHIT Mills
Raysa Leija
ROHIT Cornejo
Dr. Neville
Raysa Leija
Kamaljit Saha  [id number]
Lina Cornejo ACP

## 2021-07-30 NOTE — PROVIDER CONTACT NOTE (OTHER) - ACTION/TREATMENT ORDERED:
Dr. Neville notified, stated he will come see patient , Night RN will be told to continue to monitor
Will give 10mg of midodrine
Bolus of NS and midodrine.
confirmed vitals over the phone - continue to monitor
provider made aware, continue to monitor.
Hold metoprolol for parameter. Continue to monitor.
Patient to be lying down in bed, please give 250 bolus and midodrine as ordered.

## 2021-07-30 NOTE — PROGRESS NOTE ADULT - PROBLEM SELECTOR PLAN 2
TRUMAN significant for vegetation on posterior leaflet of mitral valve  - Antibiotic therapy as above  - No AC at this time in setting of patient being unable to tolerate PO

## 2021-07-30 NOTE — PROGRESS NOTE ADULT - PROBLEM SELECTOR PLAN 9
- DNR/DNI  - Palliative following  - Speech and swallow consult  - Discussion with family had regarding feeding status and possible NGT placement. Family desired to think about the placement - DNR/DNI  - Palliative following  - Speech and swallow consult  - Discussion with family had regarding feeding status and possible NGT placement. Family desired to think about the placement. If decision made to place NGT will restart PO medications - DNR/DNI  - Palliative following  - Speech and swallow recommending continued NPO status as PO contraindicated  - Discussion with family had regarding feeding status and possible NGT placement. Family desired to think about the placement. If decision made to place NGT will restart PO medications

## 2021-07-30 NOTE — PROGRESS NOTE ADULT - ASSESSMENT
69 yo M with PMhx of CAD s/p CABG (course at that time complicated by pleural effusion requiring chest tubes), ESRD on HD (MWF), DMII, HTN, afib on coumadin, and anemia transferred from [facility] for evaluation of chest pain after HD and GIB s/p 2U PRBC transfusion and aflutter/afib with RVR managed with metoprolol. Admitted to MICU for hypotension dependent on pressors likely 2/2 to sepsis. In MICU, patient had a cardiac arrest on 7/13 requiring CPR, 3x epi, and 2x shock (for VTach). Course complicated by recent drop in hgb requiring pRBCs. CT of leg shows hematoma on right adductor. Patient lethargic, suggesting compromised mental status. AMS likely 2/2 R frontal CVA and anoxic brain injury in the setting of cardiac arrest. Patient transferred from MICU to floor. Blood cultures now growing Serratia. 69 yo M with PMhx of CAD s/p CABG (course at that time complicated by pleural effusion requiring chest tubes), ESRD on HD (TTS), DMII, HTN, afib on coumadin, and anemia transferred from [facility] for evaluation of chest pain after HD and GIB s/p 2U PRBC transfusion and aflutter/afib with RVR managed with metoprolol. Admitted to MICU for hypotension dependent on pressors likely 2/2 to sepsis. In MICU, patient had a cardiac arrest on 7/13 requiring CPR, 3x epi, and 2x shock (for VTach). Course complicated by recent drop in hgb requiring pRBCs. CT of leg shows hematoma on right adductor. Patient lethargic, suggesting compromised mental status. AMS likely 2/2 R frontal CVA and anoxic brain injury in the setting of cardiac arrest. Patient transferred from MICU to floor. Blood cultures now growing Serratia.

## 2021-07-30 NOTE — PROGRESS NOTE ADULT - ASSESSMENT
71 yo M with PMhx of CAD s/p CABG (course at that time complicated by pleural effusion requiring chest tubes), ESRD on HD (MWF), DMII, HTN, afib on coumadin, and anemia presented from [facility] for evaluation of chest pain and GIB.       left sided pleural effusion: loculated and chronic:   CAD:  CABG:  ESRD:   DM:  HTN:  A fibrillation:   GI Bleed    7/7:    left sided pleural effusion: loculated and chronic: he has chr loculated pleural effusion on left side: He had chest tube placement in last June 2020 at another hospital: no chemistry is available but he had negative cytology at that time: This time the effusion seems slightly worse and has loculated effusion with pleural thickening: heis not SOB andhasbeen on roomair: I think , it at all, if this effusion needs to be dealt with : it is probably vats: will contact thoracic surgery : etiology not very clear as there is no previous chemistry: coult be exudative or transudatve: he is on HD and now it is a chr christiano effusion   CAD: cont per cards  CABG: cont current meds  ESRD: on HD   DM: monitor and control  HTN: controlled  A fibrillation: off ac:   GI Bleed: per gi :  DW pt and t [email or web address]    7/8:    left sided pleural effusion:: fever: s/p left sided chest tube placement: cultures sent: however with pr criteria it seems transudative await serum LDH but pleural fluid LDH is low:  already started on broad spectrum antibiotics ID to see: await cultures:   CAD: cont per cards  CABG: cont current meds  ESRD: on HD   DM: monitor and control  HTN: controlled  A fibrillation: off ac:   GI Bleed: per gi :  DW pmd    7/9:    left sided pleural effusion:: fever: s/p left sided chest tube placement: cultures sent: however with pr criteria it seems borderline transudative and transudative by LDH criteria: the vulutres ahve been negative so far:  already started on broad spectrum antibiotics ID to see: await cultures: ? source of spesis  CAD: cont per cards  CABG: cont current meds  ESRD: on HD   DM: monitor and control  HTN: in shock: now on vasopressors:   A fibrillation: off ac:   GI Bleed: per gi :  DW pmd  micu team    7/11:      left sided pleural effusion:: fever: s/p left sided chest tube placement: cultures sent: however with pr criteria it seems borderline transudative and transudative by LDH criteria: the cultures have been negative so far:  already started on broad spectrum antibiotics : Has MSSA bactermia  CAD: cont per cards: still on vasopressors: wean as tolerated ? needs molly ro tule out IE : would defer to cards:   CABG: cont current meds  ESRD: on HD   DM: monitor and control  HTN: in shock: now on vasopressors:   A fibrillation: off ac:   GI Bleed: per gi :  DW pmd  micu team    7/12:    left sided pleural effusion:: fever: s/p left sided chest tube placement:- now removed: cultures sent: however with pr criteria it seems borderline transudative and transudative by LDH criteria: the cultures have been negative so far:  already started on broad spectrum antibiotics : Has MRSA bactermia: vanco restarted yesterday ? echo/molly  Ac hypercarbic resp fialure: needs to be watched closelyin MICU : needs bipap may need intubation" At this time he is alert and awake: and responds to simple questions:    CAD: cont per cards: still on vasopressors: wean as tolerated ? needs molly ro tule out IE : would defer to cards:   CABG: cont current meds  ESRD: on HD   DM: monitor and control  HTN: in shock: now on vasopressors:   A fibrillation: off ac:   GI Bleed: per gi :  DW pmd  cards and bedstaff    7/13:  left sided pleural effusion:: fever: s/p left sided chest tube placement:- now removed: cultures sent: however with pr criteria it seems borderline transudative and transudative by LDH criteria: the cultures have been negative so far:  already started on broad spectrum antibiotics : Has MRSA bacteremia vanco restarted yesterday ? echo/molly  Ac hypercarbic resp fialure: now intubated s/p acls protocol last night   CAD: cont per cards: still on vasopressors: wean as tolerated ? needs molly ro tule out IE : would defer to cards:   CABG: cont current meds  ESRD: on HD   DM: monitor and control  HTN: in shock: now on vasopressors:   A fibrillation: off ac:   GI Bleed: per gi :  DELLA  staff  pt is critically ill now:     7/14:  left sided pleural effusion:: fever: s/p left sided chest tube placement:- now removed: cultures sent: however with pr criteria it seems borderline transudative and transudative by LDH criteria: the cultures have been negative so far:  already started on broad spectrum antibiotics : Has MRSA bacteremia vanco restarted yesterday now for  echo/molly  Ac hypercarbic resp fialure: now intubated s/p acls protocol last night -s/p cardiac arrest: ROSC 10 mts:   CAD: cont per cards: still on vasopressors: wean as tolerated ? needs molly ro tule out IE : would defer to cards: Cotn antbitioc  CABG: cont current meds  ESRD: on HD   DM: monitor and control  HTN: in shock: now on vasopressors:   A fibrillation: off ac:   GI Bleed: per gi :  DW  staff  pt is critically ill now: dw cards    7/15:    left sided pleural effusion:: transudative pl effusion with no with: certainly not the source for MRSA in blood:   Ac hypercarbic resp fialure: now intubated s/p acls protocol last night -s/p cardiac arrest: ROSC 10 mts:   CAD: cont per cards: still on vasopressors: wean as tolerated ? needs molly ro tule out IE : would defer to cards: Cotn antbitioc  CABG: cont current meds  ESRD: on HD   DM: monitor and control  HTN: in shock: now on vasopressors:   A fibrillation: on ac:   GI Bleed: per gi :  DW  staff  pt is critically ill now:     7/16"  left sided pleural effusion:: transudative pl effusion with no with: certainly not the source for MRSA in blood: ?source for MRSA:   Ac hypercarbic resp fialure: now intubated s/p acls protocol last night -s/p cardiac arrest: ROSC 10 mts: he seems to respond off sedation:per MICU note:   CAD: cont per cards: still on vasopressors: wean as tolerated ? needs molly ro tule out IE : would defer to cards: Cotn antbitioc  CABG: cont current meds  ESRD: on HD   DM: monitor and control  HTN: in shock: now on vasopressors:   A fibrillation: on ac:   GI Bleed: per gi :  DW  staff: PMD  pt is critically ill now:      7/17:    left sided pleural effusion:: transudative pl effusion with no with: certainly not the source for MRSA in blood: ?source for MRSA: for molly on monday  Ac hypercarbic resp fialure: now intubated s/p acls protocol last night -s/p cardiac arrest: ROSC 10 mts:   CAD: cont per cards: still on vasopressors: wean as tolerated ? needs molly ro tule out IE : would defer to cards: Cont antibiotics:   CABG: cont current meds  ESRD: on HD   DM: monitor and control  HTN: in shock: now on vasopressors:   A fibrillation: on ac:   GI Bleed: per gi :  DW  staff: PMD  pt is critically ill now:    7/18:    left sided pleural effusion:: transudative pl effusion with no with: certainly not the source for MRSA in blood: ?source for MRSA: for molly on monday: he is off sedation   Ac hypercarbic resp fialure: now intubated s/p acls protocol last night -s/p cardiac arrest: ROSC 10 mts:   Acute cva: noted on ct scan head: neurology following: already on heparin   CAD: cont per cards: still on vasopressors: wean as tolerated ? needs molly ro tule out IE : would defer to cards: Cont antibiotics:   CABG: cont current meds  ESRD: on HD   DM: monitor and control  HTN: in shock: now on vasopressors:   A fibrillation: on ac:   GI Bleed: per gi :  pt is critically ill now:    7/19:      left sided pleural effusion:: transudative pl effusion with no with: certainly not the source for MRSA in blood: ?source for MRSA: for molly today: he is off sedation but still on pressors:   Ac hypercarbic resp fialure: now intubated s/p acls protocol last night -s/p cardiac arrest: ROSC 10 mts:   Acute cva: noted on ct scan head: neurology following: already on heparin   CAD: cont per cards: still on vasopressors: wean as tolerated ? needs molly ro tule out IE : would defer to cards: Cont antibiotics: for molly today  CABG: cont current meds  ESRD: on HD   DM: monitor and control  HTN: in shock: now on vasopressors:   A fibrillation: on ac:   GI Bleed: per gi :  pt is critically ill now:    7/20  Acute Hypercarbic Respiratory Failure  -S/p RRT, tx to MICU 7/8, intubated 7/13 s/p cardiac arrest w/ ROSC  -CPAP trials, ventilator management per MICU team  -Pulmonary toileting, suction PRN   L pleural effusion   -Loculated L pleural effusion on CT chest, s/p CT placement by thoracic 7/8  -Transudative effusion, Cx negative   -CT since removed   -Repeat CT chest with small b/l partially loculated effusions, adjacent atelectasis  Bacteremia  -BC 7/9 MRSA+  -ABX per ID  -Repeat BC NGTD   -F/u cardiology recs regarding MOLLY   CAD (s/p CABG) - per cards   ESRD - HD per renal   GI bleed - GI recs noted     7/21  Acute Hypercarbic Respiratory Failure  -S/p RRT, tx to MICU 7/8, intubated 7/13 s/p cardiac arrest w/ ROSC  -CPAP trials, ventilator management per MICU team  -Pulmonary toileting, suction PRN   L pleural effusion   -Loculated L pleural effusion on CT chest, s/p CT placement by thoracic 7/8  -Transudative effusion, Cx negative   -CT since removed   -Repeat CT chest with small b/l partially loculated effusions, adjacent atelectasis  Bacteremia  -BC 7/9 MRSA+  -ABX per ID  -Repeat BC NGTD   -MOLLY now with MV vegetation per cards  -Pending CT lumbar spine   -F/u CT surgery recs  CAD (s/p CABG) - per cards   ESRD - HD per renal   GI bleed - GI recs noted     7/22  Acute Hypercarbic Respiratory Failure  --S/p RRT, tx to MICU 7/8, intubated 7/13 s/p cardiac arrest w/ ROSC-10 mts  --CPAP trials, ventilator management per MICU team  --Pulmonary toileting, suction PRN   L pleural effusion   --Loculated L pleural effusion on CT chest, s/p CT placement by thoracic 7/8  --Transudative effusion, Cx negative   --CT since removed   --Repeat CT chest with small b/l partially loculated effusions, adjacent atelectasis  Bacteremia  --BC 7/9 MRSA+, repeat BC NGTD   --MOLLY now with MV vegetation, not a candidate for surgery per CTS  --CT lumbar spine with no evidence of discitis   --ABX per ID  DVT prophylaxis  --AC held due to drop in H/H  --Concern for R thigh hematoma, vascular recs noted   CAD (s/p CABG) - per cards   ESRD - HD per renal   GI bleed - resolved, GI recs noted   Care per MICU team     7/23  Acute Hypercarbic Respiratory Failure  --S/p RRT, tx to MICU 7/8, intubated 7/13 s/p cardiac arrest w/ ROSC-10 mts  --Tolerating CPAP trials, plan to extubate per RN   --Ventilator management per MICU team  --Pulmonary toileting, suction PRN   L pleural effusion   --Loculated L pleural effusion on CT chest, s/p CT placement by thoracic 7/8  --Transudative effusion, Cx negative   --CT since removed   --Repeat CT chest with small b/l partially loculated effusions, adjacent atelectasis  Bacteremia  --BC 7/9 MRSA+, repeat BC NGTD   --MOLLY with MV vegetation, not a candidate for surgery per CTS  --CT lumbar spine with no evidence of discitis   --ABX per ID  DVT prophylaxis  --AC held due to drop in H/H  --Concern for R thigh hematoma, vascular recs noted   CAD (s/p CABG) - per cards   ESRD - HD per renal   GI bleed - resolved, GI recs noted   Care per MICU team     7/24:    Acute Hypercarbic Respiratory Failure : S/p RRT, tx to MICU 7/8, intubated 7/13 s/p cardiac arrest w/ ROSC-10 mts extubated now: alert and awake:   L pleural effusion : Loculated L pleural effusion on CT chest, s/p CT placement by thoracic 7/8: Transudative effusion, Cx negative : CT since removed : Repeat CT chest with small b/l partially loculated effusions, adjacent atelectasis  Bacteremia: -serratia: now: ij new blood cultures cont antibiotics : MOLLY with MV vegetation, not a candidate for surgery per CTS  --CT lumbar spine with no evidence of discitis   --ABX per ID  DVT prophylaxis : has hematoma in adductionr muscles: AC held due to drop in H/H  CAD (s/p CABG) - per cards   ESRD - HD per renal   GI bleed - resolved, GI recs noted   Care per MICU team : della resident today     7/25:    Acute Hypercarbic Respiratory Failure : S/p RRT, tx to MICU 7/8, intubated 7/13 s/p cardiac arrest w/ ROSC-10 mts extubated now: todays he is lethargic abg being done:   L pleural effusion : Loculated L pleural effusion on CT chest, s/p CT placement by thoracic 7/8: Transudative effusion, Cx negative : CT since removed : Repeat CT chest with small b/l partially loculated effusions, adjacent atelectasis  Bacteremia: -serratia: now: ij new blood cultures cont antibiotics : MOLLY with MV vegetation, not a candidate for surgery per CTS: CT lumbar spine with no evidence of discitis : ABX per ID  DVT prophylaxis : has hematoma in adductor muscles: AC held due to drop in H/H  CAD (s/p CABG) - per cards   ESRD - HD per renal   GI bleed - resolved, GI recs noted   Care per MICU team : dw resident today too!    7/26:    Acute Hypercarbic Respiratory Failure : S/p RRT, tx to MICU 7/8, intubated 7/13 s/p cardiac arrest w/ ROSC-10 mts extubated now: todays he is lethargic abg from yesterday  noted: pretty reasonable   L pleural effusion : Loculated L pleural effusion on CT chest, s/p CT placement by thoracic 7/8: Transudative effusion, Cx negative : CT since removed : Repeat CT chest with small b/l partially loculated effusions, adjacent atelectasis  Bacteremia: -serratia: now: ij new blood cultures cont antibiotics : MOLLY with MV vegetation, not a candidate for surgery per CTS: CT lumbar spine with no evidence of discitis : ABX per ID  DVT prophylaxis : has hematoma in adductor muscles: AC held due to drop in H/H  CAD (s/p CABG) - per cards   ESRD - HD per renal   GI bleed - resolved, GI recs noted   Care per MICU team :     7/27  Acute Hypercarbic Respiratory Failure   -S/p RRT, tx to MICU 7/8, intubated 7/13 s/p cardiac arrest w/ ROSC (10 min)  -Extubated now, ABG noted  -O2 sats % on 2LNC  -Chest PT   L pleural effusion   -Loculated L pleural effusion on CT chest, s/p CT placement by thoracic 7/8 (Transudative effusion, Cx negative)  -CT since removed   -Repeat CT chest with small b/l partially loculated effusions, adjacent atelectasis  Bacteremia  -BC 7/9 MRSA+, now BC 7/24 +Serratia  -MOLLY with MV vegetation  -ABX per ID   -ID recs noted  DVT prophylaxis  -remains off AC per MICU team   CAD (s/p CABG) - per cards   ESRD - HD per renal   GI bleed - resolved, GI recs noted   Palliative care recs noted, overall prognosis guarded     7/29  Acute Hypercarbic Respiratory Failure   -S/p RRT, tx to MICU 7/8, intubated 7/13 s/p cardiac arrest w/ ROSC (10 min)  -Extubated now, now transferred from MICU to floor  -Seen on 4LNC with O2 sat 100%, can wean O2 as tolerated  -Check ABG  -Aspiration precautions  -Pending CT A/P with IV contrast, if creatinine allows can also check CT chest   L pleural effusion   -Loculated L pleural effusion on CT chest, s/p CT placement by thoracic 7/8 (Transudative effusion, Cx negative)  -CT since removed   -Repeat CT chest with small b/l partially loculated effusions, adjacent atelectasis  -F/u CT chest with contrast if able   Bacteremia  -BC 7/9 MRSA+, now BC 7/24 +Serratia  -MOLLY with MV vegetation  -ABX per ID   -ID recs noted  -F/u CT  DVT prophylaxis  -remains off AC at this time  -H/H stable, cardiology recs noted  Dysphagia   -S/S consult  -If unable to tolerate PO, f/u GOC conversation with family regarding enteral feeds  CAD (s/p CABG) - per cards   ESRD - HD per renal   Palliative care recs noted, overall prognosis guarded   D/w ACP    7/30:    Acute Hypercarbic Respiratory Failure   -S/p RRT, tx to MICU 7/8, intubated 7/13 s/p cardiac arrest w/ ROSC (10 min)  -Extubated now, now transferred from MICU to floor  -Seen on 4LNC with O2 sat 100%, can wean O2 as tolerated  -Check ABG  -Aspiration precautions  repeat ct chest noed; suspicious for pneumonia on left side: he is aleready on cefepime as well as vanco: della team : to discuss with ID to see if any antibtiocs needed to be changed    L pleural effusion   -Loculated L pleural effusion on CT chest, s/p CT placement by thoracic 7/8 (Transudative effusion, Cx negative)  -CT since removed   -Repeat CT chest with small b/l partially loculated effusions, adjacent atelectasis  Bacteremia  -BC 7/9 MRSA+, now BC 7/24 +Serratia  -MOLLY with MV vegetation  -ABX per ID   -ID recs noted  DVT prophylaxis  -remains off AC at this time  -H/H stable, cardiology recs noted  Dysphagia   -S/S consult  -If unable to tolerate PO, f/u GOC conversation with family regarding enteral feeds  CAD (s/p CABG) - per cards   ESRD - HD per renal   Palliative care recs noted, overall prognosis guarded   D/w ACP

## 2021-07-30 NOTE — PROGRESS NOTE ADULT - ATTENDING COMMENTS
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, f/up repeat blood cultures from 7/29, not a surgical candidate, check CT A/P, c/w IV Vanco and Cefepime, monitor Vanco level, ID following   Septic shock d/t MRSA/serratia bacteremia, shock resolved s/p pressors, discontinued Midodrine   Acute embolic stroke, restart Coumadin w/o bridging and monitor for bleeding once able to tolerate PO  Acute blood loss anemia d/t R adductor muscle hematoma w/o active extravasation, s/p 3 units PRBC total, monitor H/H, held anticoagulation   Acute encephalopathy d/t acute CVA and possible anoxic brain injury post cardiac arrest, MRI brain unlikely to , GOC discussion   Acute hypoxic respiratory failure in setting of cardiac arrest, s/p intubation and now extubated, c/w supplemental O2, check CT chest, continuous pulse oxymetry   Paroxysmal Afib, restart Coumadin w/o bridging and monitor INR once able to take PO, c/w IV Lopressor   ESRD, hemodialysis as per renal, may need to restart Midodrine on dialysis days if persistent hypotension w/ dialysis   Dysphagia, NPO for now, speech and swallow f/up, aspiration precautions, consider NGT placement if family agrees   DNR/DNI.

## 2021-07-30 NOTE — PROGRESS NOTE ADULT - SUBJECTIVE AND OBJECTIVE BOX
Date of Service: 07/30/2021    S: Opens eyes but lethargic and does not answer questions appropriately therefore unable to obtain ROS.    Review of Systems:   Constitutional: [ ] fevers, [ ] chills.   Skin: [ ] dry skin. [ ] rashes.  Psychiatric: [ ] depression, [ ] anxiety.   Gastrointestinal: [ ] BRBPR, [ ] melena.   Neurological: [ ] confusion. [ ] seizures. [ ] shuffling gait.   Ears,Nose,Mouth and Throat: [ ] ear pain [ ] sore throat.   Eyes: [ ] diplopia.   Respiratory: [ ] hemoptysis. [ ] shortness of breath  Cardiovascular: See HPI above  Hematologic/Lymphatic: [ ] anemia. [ ] painful nodes. [ ] prolonged bleeding.   Genitourinary: [ ] hematuria. [ ] flank pain.   Endocrine: [ ] significant change in weight. [ ] intolerance to heat and cold.     Review of systems [ ] otherwise negative, [x ] otherwise unable to obtain    FH: no family history of sudden cardiac death in first degree relatives    SH: [ ] tobacco, [ ] alcohol, [ ] drugs      MEDICATIONS  (STANDING):  atorvastatin 40 milliGRAM(s) Oral at bedtime  cefepime   IVPB 1000 milliGRAM(s) IV Intermittent daily  chlorhexidine 2% Cloths 1 Application(s) Topical daily  dextrose 40% Gel 15 Gram(s) Oral once  dextrose 5%. 1000 milliLiter(s) (50 mL/Hr) IV Continuous <Continuous>  dextrose 5%. 1000 milliLiter(s) (100 mL/Hr) IV Continuous <Continuous>  dextrose 50% Injectable 12.5 Gram(s) IV Push once  dextrose 50% Injectable 25 Gram(s) IV Push once  dextrose 50% Injectable 25 Gram(s) IV Push once  epoetin danilo-epbx (RETACRIT) Injectable 25821 Unit(s) IV Push <User Schedule>  glucagon  Injectable 1 milliGRAM(s) IntraMuscular once  insulin lispro (ADMELOG) corrective regimen sliding scale   SubCutaneous every 6 hours  pantoprazole    Tablet 40 milliGRAM(s) Oral two times a day  vancomycin  IVPB 750 milliGRAM(s) IV Intermittent <User Schedule>    MEDICATIONS  (PRN):  acetaminophen   Tablet .. 1000 milliGRAM(s) Oral every 6 hours PRN Temp greater or equal to 38C (100.4F), Mild Pain (1 - 3), Moderate Pain (4 - 6)  calamine/zinc oxide Lotion 1 Application(s) Topical three times a day PRN Itching  metoprolol tartrate Injectable 5 milliGRAM(s) IV Push every 6 hours PRN HR>100 and/or SBP>140      LABS:                          9.3    12.27 )-----------( 288      ( 30 Jul 2021 07:51 )             33.8     Hemoglobin: 9.3 g/dL (07-30 @ 07:51)  Hemoglobin: 9.2 g/dL (07-29 @ 07:09)  Hemoglobin: 8.4 g/dL (07-28 @ 07:33)  Hemoglobin: 8.6 g/dL (07-27 @ 03:53)  Hemoglobin: 8.0 g/dL (07-26 @ 04:25)    07-30    143  |  98  |  24<H>  ----------------------------<  112<H>  4.2   |  26  |  3.71<H>    Ca    9.7      30 Jul 2021 07:51  Phos  3.2     07-30  Mg     2.10     07-30    TPro  6.9  /  Alb  2.5<L>  /  TBili  2.5<H>  /  DBili  x   /  AST  53<H>  /  ALT  35  /  AlkPhos  527<H>  07-30    Creatinine Trend: 3.71<--, 5.39<--, 4.14<--, 6.08<--, 5.06<--, 4.13<--             07-29-21 @ 07:01  -  07-30-21 @ 07:00  --------------------------------------------------------  IN: 700 mL / OUT: 1747 mL / NET: -1047 mL        PHYSICAL EXAM  Vital Signs Last 24 Hrs  T(C): 36.8 (30 Jul 2021 07:00), Max: 36.9 (29 Jul 2021 22:06)  T(F): 98.2 (30 Jul 2021 07:00), Max: 98.4 (29 Jul 2021 22:06)  HR: 95 (30 Jul 2021 07:00) (78 - 95)  BP: 122/64 (30 Jul 2021 07:00) (103/68 - 155/56)  BP(mean): 82 (29 Jul 2021 22:06) (82 - 82)  RR: 17 (30 Jul 2021 07:00) (17 - 20)  SpO2: 96% (30 Jul 2021 07:00) (96% - 96%)      Gen: NAD  HEENT:  (-)icterus (-)pallor  CV: N S1 S2 1/6 DEJAH (+)2 Pulses B/l  Resp:  Clear to auscultation B/L, normal effort  GI: (+) BS Soft, NT, ND  Lymph:  (-)Edema, (-)obvious lymphadenopathy  Skin: Warm to touch, Normal turgor  Psych:  unable to adequately assess mood and affect    DATA  < from: Transthoracic Echocardiogram (07.07.21 @ 18:17) >  ------------------------------------------------------------------------  CONCLUSIONS:  1. Mitral annular calcification, otherwise normal mitral  valve. Minimal mitral regurgitation.  2. Endocardium not well visualized; grossly normal left  ventricular systolic function.  3. Right ventricular enlargement with decreased right  ventricular systolic function.  4. Inadequate tricuspid regurgitation Doppler envelope  precludes estimation of RVSP.  ------------------------------------------------------------------------  Confirmed on  7/7/2021 - 21:16:20 by Osvaldo Bertrand M.D.,  [facility], JOHN    < end of copied text >      A/P) 71 y/o male PMH CAD s/p CABG and old PCI, AFL x 1 year on coumadin, HTN, hyperlipidemia, DM, ESRD on HD, and hypothyroidism originally admitted to [facility] with GIB, transferred to [facility], found to have septic shock and MRSA bacteremia, trx to MICU s/p PEA arrest, intubated and now extubated    -Given MRSA bacteremia, TRUMAN performed demonstrating MV endocarditis - discussed with CTS Dr. Pierce - pt. not a surgical candidate at this time   -Abx per ID, blood cx now + for serratia, pending CT C/A/P  -CTH head noted - pt. with cva  -Right thigh hematoma noted on CT scan - appreciate vascular evaluation - no surgical intervention required by vascular at this time - monitor h/h  -H/H remains stable - can resume coumadin with NO bridge when tolerating PO  -Follow up palliative care, pt is DNR  -HD per renal - monitor BP off midodrine  -Remains NPO - S+S following  -No further inpatient cardiac w/u expected    Wilfred Robin PA-C  Pager: [phone]

## 2021-07-30 NOTE — PROVIDER CONTACT NOTE (OTHER) - DATE AND TIME:
08-Jul-2021 08:38
30-Jul-2021 21:04
07-Jul-2021 17:52
08-Jul-2021 12:50
05-Jul-2021 05:23
27-Jul-2021 19:58
07-Jul-2021 20:16
08-Jul-2021 00:26

## 2021-07-30 NOTE — PROGRESS NOTE ADULT - PROBLEM SELECTOR PLAN 4
Patient with right frontal infarct found on CT, most recent CTH demonstrates no new acute pathology, only evolving R infarct  - Etiology unknown, but potentially in setting of cardiac arrest vs AF vs septic emboli  - Patient being held off on AC at this time per cardio iso being unable to tolerate PO  - C/W continuous pulse oximetry

## 2021-07-31 LAB
GLUCOSE BLDC GLUCOMTR-MCNC: 142 MG/DL — HIGH (ref 70–99)
GLUCOSE BLDC GLUCOMTR-MCNC: 92 MG/DL — SIGNIFICANT CHANGE UP (ref 70–99)
GLUCOSE BLDC GLUCOMTR-MCNC: 94 MG/DL — SIGNIFICANT CHANGE UP (ref 70–99)

## 2021-07-31 PROCEDURE — 99232 SBSQ HOSP IP/OBS MODERATE 35: CPT | Mod: GC

## 2021-07-31 RX ADMIN — HYDROMORPHONE HYDROCHLORIDE 0.5 MILLIGRAM(S): 2 INJECTION INTRAMUSCULAR; INTRAVENOUS; SUBCUTANEOUS at 19:33

## 2021-07-31 RX ADMIN — PANTOPRAZOLE SODIUM 40 MILLIGRAM(S): 20 TABLET, DELAYED RELEASE ORAL at 05:45

## 2021-07-31 RX ADMIN — CEFEPIME 100 MILLIGRAM(S): 1 INJECTION, POWDER, FOR SOLUTION INTRAMUSCULAR; INTRAVENOUS at 12:28

## 2021-07-31 RX ADMIN — Medication 250 MILLIGRAM(S): at 18:45

## 2021-07-31 RX ADMIN — HYDROMORPHONE HYDROCHLORIDE 0.5 MILLIGRAM(S): 2 INJECTION INTRAMUSCULAR; INTRAVENOUS; SUBCUTANEOUS at 19:47

## 2021-07-31 RX ADMIN — CHLORHEXIDINE GLUCONATE 1 APPLICATION(S): 213 SOLUTION TOPICAL at 13:04

## 2021-07-31 RX ADMIN — PANTOPRAZOLE SODIUM 40 MILLIGRAM(S): 20 TABLET, DELAYED RELEASE ORAL at 18:45

## 2021-07-31 NOTE — PROGRESS NOTE ADULT - ATTENDING COMMENTS
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, f/up repeat blood cultures from 7/29, not a surgical candidate, c/w IV Vanco and Cefepime, monitor Vanco level, ID following   Septic shock d/t MRSA/serratia bacteremia, shock resolved s/p pressors, discontinued Midodrine   Acute embolic stroke, holding anticoagulation d/t R abductor muscle hematoma and recent questionable GI bleed   Acute blood loss anemia d/t R adductor muscle hematoma w/o active extravasation, s/p 3 units PRBC total, monitor H/H, held anticoagulation   Acute encephalopathy d/t acute CVA and possible anoxic brain injury post cardiac arrest, MRI brain unlikely to , HCP wants comfort care   Acute hypoxic respiratory failure d/t aspiration pnrumonia, s/p intubation and now extubated, c/w supplemental O2, on IV Vanco and Cefepime for possible MRSA or gram negative PNA, stared on Dilaudid IV prn   Paroxysmal Afib, holding Coumadin d/t hematoma and ?GI bleed, c/w IV Lopressor   ESRD, intermittent hypotension during dialysis, HCP wants comfort care and wishes to discontinue dialysis   Dysphagia, failed dysphagia screen and high risk of aspiration, plan is for pleasure feeds and comfort care   DNR/DNI, hospice referral

## 2021-07-31 NOTE — PROGRESS NOTE ADULT - PROBLEM SELECTOR PLAN 9
- DNR/DNI  - Palliative following  - Speech and swallow recommending continued NPO status as PO contraindicated  - Discussion with family had regarding feeding status and possible NGT placement. Family desired to think about the placement. If decision made to place NGT will restart PO medications

## 2021-07-31 NOTE — PROGRESS NOTE ADULT - SUBJECTIVE AND OBJECTIVE BOX
Date of Service: 07/31/2021     pt seen and examined, no issues overnight    Review of Systems:   Constitutional: [ ] fevers, [ ] chills.   Skin: [ ] dry skin. [ ] rashes.  Psychiatric: [ ] depression, [ ] anxiety.   Gastrointestinal: [ ] BRBPR, [ ] melena.   Neurological: [ ] confusion. [ ] seizures. [ ] shuffling gait.   Ears,Nose,Mouth and Throat: [ ] ear pain [ ] sore throat.   Eyes: [ ] diplopia.   Respiratory: [ ] hemoptysis. [ ] shortness of breath  Cardiovascular: See HPI above  Hematologic/Lymphatic: [ ] anemia. [ ] painful nodes. [ ] prolonged bleeding.   Genitourinary: [ ] hematuria. [ ] flank pain.   Endocrine: [ ] significant change in weight. [ ] intolerance to heat and cold.     Review of systems [ ] otherwise negative, [x ] otherwise unable to obtain    FH: no family history of sudden cardiac death in first degree relatives    SH: [ ] tobacco, [ ] alcohol, [ ] drugs           acetaminophen   Tablet .. 1000 milliGRAM(s) Oral every 6 hours PRN  calamine/zinc oxide Lotion 1 Application(s) Topical three times a day PRN  cefepime   IVPB 1000 milliGRAM(s) IV Intermittent daily  chlorhexidine 2% Cloths 1 Application(s) Topical daily  HYDROmorphone  Injectable 0.5 milliGRAM(s) IV Push every 2 hours PRN  metoprolol tartrate Injectable 5 milliGRAM(s) IV Push every 6 hours PRN  pantoprazole  Injectable 40 milliGRAM(s) IV Push two times a day  vancomycin  IVPB 750 milliGRAM(s) IV Intermittent <User Schedule>                            9.3    12.27 )-----------( 288      ( 30 Jul 2021 07:51 )             33.8       Hemoglobin: 9.3 g/dL (07-30 @ 07:51)  Hemoglobin: 9.2 g/dL (07-29 @ 07:09)  Hemoglobin: 8.4 g/dL (07-28 @ 07:33)  Hemoglobin: 8.6 g/dL (07-27 @ 03:53)      07-30    143  |  98  |  24<H>  ----------------------------<  112<H>  4.2   |  26  |  3.71<H>    Ca    9.7      30 Jul 2021 07:51  Phos  3.2     07-30  Mg     2.10     07-30    TPro  6.9  /  Alb  2.5<L>  /  TBili  2.5<H>  /  DBili  x   /  AST  53<H>  /  ALT  35  /  AlkPhos  527<H>  07-30    Creatinine Trend: 3.71<--, 5.39<--, 4.14<--, 6.08<--, 5.06<--, 4.13<--    COAGS:           T(C): 36.8 (07-31-21 @ 05:47), Max: 36.8 (07-31-21 @ 05:47)  HR: 87 (07-31-21 @ 05:47) (85 - 87)  BP: 122/54 (07-31-21 @ 05:47) (122/54 - 141/68)  RR: 19 (07-31-21 @ 05:47) (19 - 20)  SpO2: 93% (07-31-21 @ 05:47) (93% - 99%)  Wt(kg): --    I&O's Summary      Gen: NAD  HEENT:  (-)icterus (-)pallor  CV: N S1 S2 1/6 DEJAH (+)2 Pulses B/l  Resp:  Clear to auscultation B/L, normal effort  GI: (+) BS Soft, NT, ND  Lymph:  (-)Edema, (-)obvious lymphadenopathy  Skin: Warm to touch, Normal turgor  Psych:  unable to adequately assess mood and affect    DATA  < from: Transthoracic Echocardiogram (07.07.21 @ 18:17) >  ------------------------------------------------------------------------  CONCLUSIONS:  1. Mitral annular calcification, otherwise normal mitral  valve. Minimal mitral regurgitation.  2. Endocardium not well visualized; grossly normal left  ventricular systolic function.  3. Right ventricular enlargement with decreased right  ventricular systolic function.  4. Inadequate tricuspid regurgitation Doppler envelope  precludes estimation of RVSP.  ------------------------------------------------------------------------  Confirmed on  7/7/2021 - 21:16:20 by Osvaldo Bertrand M.D.,  [facility], [facility]    < end of copied text >      A/P) 71 y/o male PMH CAD s/p CABG and old PCI, AFL x 1 year on coumadin, HTN, hyperlipidemia, DM, ESRD on HD, and hypothyroidism originally admitted to [facility] with GIB, transferred to [facility], found to have septic shock and MRSA bacteremia, trx to MICU s/p PEA arrest, intubated and now extubated    -Given MRSA bacteremia, TRUMAN performed demonstrating MV endocarditis - discussed with CTS Dr. Pierce - pt. not a surgical candidate at this time   -Abx per ID, blood cx now + for serratia, pending CT C/A/P  -CTH head noted - pt. with cva  -Right thigh hematoma noted on CT scan - appreciate vascular evaluation - no surgical intervention required by vascular at this time - monitor h/h  -H/H remains stable    -Follow up palliative care, pt is DNR  -HD per renal   -Remains NPO    -No further inpatient cardiac w/u expected

## 2021-07-31 NOTE — PROGRESS NOTE ADULT - PROBLEM SELECTOR PLAN 3
Patient found to have hematoma of right leg on CT  - Hg stable at this time  - No concern for bleed at this time  - Transfusion threshold of Hg < 8  - CBC daily TRUMAN significant for vegetation on posterior leaflet of mitral valve  - Antibiotic therapy as above  - No AC at this time in setting of patient being unable to tolerate PO

## 2021-07-31 NOTE — PROGRESS NOTE ADULT - ASSESSMENT
Patient seen and examined, agree with above assessment and plan as transcribed above.    - CT abdomen noted  - palliative f/u    Murphy Colin MD, FACC  BEEPER [phone]

## 2021-07-31 NOTE — PROGRESS NOTE ADULT - PROBLEM SELECTOR PLAN 2
TRUMAN significant for vegetation on posterior leaflet of mitral valve  - Antibiotic therapy as above  - No AC at this time in setting of patient being unable to tolerate PO Patient with blood cultures + for MRSA and Serratia  - C/W Vanc 750mg    - C/W Cefepime 1g qd  - Cultures from 7/29/21; NGTD

## 2021-07-31 NOTE — PROGRESS NOTE ADULT - PROBLEM SELECTOR PLAN 1
Patient with blood cultures + for MRSA and Serratia  - C/W Vanc 750mg prior to HD  - C/W Cefepime 1g qd  - Cultures from 7/29/21; NGTD  - Cultures scheduled for today (7/30/21) to monitor for clearance  - F/U CT A/P and chest - DNR/DNI  - Palliative following, appreciate recs   - Decision is for comfort care- no HD, no blood draws. Focus on symptom directed care.   - Discontinue NPO. Start pleasure feeds   - Pain: IV dilaudid 0.5mg q2h for dyspnea or pain  - Son is interested in inpatient hospice and would not want home hospice as it would take a toll on his mother's health

## 2021-07-31 NOTE — PROGRESS NOTE ADULT - SUBJECTIVE AND OBJECTIVE BOX
in progress PROGRESS NOTE:     Patient is a 70y old  Male who presents with a chief complaint of Chest pain and GIB (31 Jul 2021 09:28)      SUBJECTIVE / OVERNIGHT EVENTS: Family (HCP: son) have made the decision to focus on comfort for Mr. Hardin.     Talked to son this AM. He states that he does not want any further HD sessions. He also expressed concern for Mr. Hardin's pain. I reassured him that we will keep Mr. Hardin comfortable and give IV pain medication as needed.  He is interested in inpatient hospice and would not want home hospice as it would take a toll on his mother's health.         ADDITIONAL REVIEW OF SYSTEMS: 10 point ROS negative except per HPI    MEDICATIONS  (STANDING):  cefepime   IVPB 1000 milliGRAM(s) IV Intermittent daily  chlorhexidine 2% Cloths 1 Application(s) Topical daily  pantoprazole  Injectable 40 milliGRAM(s) IV Push two times a day  vancomycin  IVPB 750 milliGRAM(s) IV Intermittent <User Schedule>    MEDICATIONS  (PRN):  acetaminophen   Tablet .. 1000 milliGRAM(s) Oral every 6 hours PRN Temp greater or equal to 38C (100.4F), Mild Pain (1 - 3), Moderate Pain (4 - 6)  calamine/zinc oxide Lotion 1 Application(s) Topical three times a day PRN Itching  HYDROmorphone  Injectable 0.5 milliGRAM(s) IV Push every 2 hours PRN pain or dyspnea  metoprolol tartrate Injectable 5 milliGRAM(s) IV Push every 6 hours PRN HR>100 and/or SBP>140      CAPILLARY BLOOD GLUCOSE      POCT Blood Glucose.: 94 mg/dL (31 Jul 2021 08:19)  POCT Blood Glucose.: 110 mg/dL (30 Jul 2021 17:40)  POCT Blood Glucose.: 96 mg/dL (30 Jul 2021 13:47)    I&O's Summary      PHYSICAL EXAM:  Vital Signs Last 24 Hrs  T(C): 36.8 (31 Jul 2021 05:47), Max: 36.8 (31 Jul 2021 05:47)  T(F): 98.2 (31 Jul 2021 05:47), Max: 98.2 (31 Jul 2021 05:47)  HR: 87 (31 Jul 2021 05:47) (85 - 87)  BP: 122/54 (31 Jul 2021 05:47) (122/54 - 141/68)  BP(mean): 71 (31 Jul 2021 05:47) (71 - 79)  RR: 19 (31 Jul 2021 05:47) (19 - 20)  SpO2: 93% (31 Jul 2021 05:47) (93% - 99%)    CONSTITUTIONAL: Man laying in bed in NAD, appears comfortable   ENMT: Moist oral mucosa, icteric sclera  RESPIRATORY: diminished respiratory effort; lungs are clear to auscultation bilaterally, on 3L NC for comfort   CARDIOVASCULAR: Regular rate and rhythm, normal S1 and S2,  LE pitting edema. Peripheral pulses are 1+ bilaterally  ABDOMEN: Soft, nontender to palpation, normoactive bowel sounds   NEUROLOGY: Patient opens eyes to voice, able to tell me his name and son's. Unable to assess strength         LABS:                          9.3    12.27 )-----------( 288      ( 30 Jul 2021 07:51 )             33.8       143  |  98  |  24<H>  ----------------------------<  112<H>  4.2   |  26  |  3.71<H>    Ca    9.7      30 Jul 2021 07:51  Phos  3.2     07-30  Mg     2.10     07-30    TPro  6.9  /  Alb  2.5<L>  /  TBili  2.5<H>  /  DBili  x   /  AST  53<H>  /  ALT  35  /  AlkPhos  527<H>  07-30          -----    MICRO      Culture - Blood (collected 29 Jul 2021 14:39)  Source: .Blood Blood-Peripheral  Preliminary Report (30 Jul 2021 15:01):    No growth to date.    Culture - Blood (collected 29 Jul 2021 14:39)  Source: .Blood Blood-Peripheral  Preliminary Report (30 Jul 2021 15:01):    No growth to date.    Culture - Blood (collected 29 Jul 2021 10:31)  Source: .Blood Blood-Peripheral  Preliminary Report (30 Jul 2021 11:01):    No growth to date.      -----    TRENDS  Hemoglobin: 9.3 g/dL (07-30 @ 07:51)  Hemoglobin: 9.2 g/dL (07-29 @ 07:09)  Hemoglobin: 8.4 g/dL (07-28 @ 07:33)  Hemoglobin: 8.6 g/dL (07-27 @ 03:53)    Creatinine Trend: 3.71<--, 5.39<--, 4.14<--, 6.08<--, 5.06<--, 4.13<--  ----  RADIOLOGY & ADDITIONAL TESTS:  Results Reviewed:   Imaging Personally Reviewed:  Electrocardiogram Personally Reviewed:    COORDINATION OF CARE:  Care Discussed with Consultants/Other Providers [Y/N]:  Prior or Outpatient Records Reviewed [Y/N]:

## 2021-07-31 NOTE — PROGRESS NOTE ADULT - PROBLEM SELECTOR PROBLEM 8
Type 2 diabetes mellitus with chronic kidney disease on chronic dialysis, with long-term current use of insulin

## 2021-07-31 NOTE — PROGRESS NOTE ADULT - PROBLEM SELECTOR PLAN 4
Patient with right frontal infarct found on CT, most recent CTH demonstrates no new acute pathology, only evolving R infarct  - Etiology unknown, but potentially in setting of cardiac arrest vs AF vs septic emboli  - Patient being held off on AC at this time per cardio iso being unable to tolerate PO  - C/W continuous pulse oximetry Patient with right frontal infarct found on CT, most recent CTH demonstrates no new acute pathology, only evolving R infarct  - Etiology unknown, but potentially in setting of cardiac arrest vs AF vs septic emboli  - Patient being held off on AC at this time per cardio as unable to tolerate PO

## 2021-07-31 NOTE — PROGRESS NOTE ADULT - PROBLEM SELECTOR PLAN 8
- Sliding scale TID before meals and QHS

## 2021-07-31 NOTE — PROGRESS NOTE ADULT - ASSESSMENT
71 yo M with PMhx of CAD s/p CABG (course at that time complicated by pleural effusion requiring chest tubes), ESRD on HD (TTS), DMII, HTN, afib on coumadin, and anemia transferred from [facility] for evaluation of chest pain after HD and GIB s/p 2U PRBC transfusion and aflutter/afib with RVR managed with metoprolol. Admitted to MICU for hypotension dependent on pressors likely 2/2 to sepsis. In MICU, patient had a cardiac arrest on 7/13 requiring CPR, 3x epi, and 2x shock (for VTach). Course complicated by recent drop in hgb requiring pRBCs. CT of leg shows hematoma on right adductor. Patient lethargic, suggesting compromised mental status. AMS likely 2/2 R frontal CVA and anoxic brain injury in the setting of cardiac arrest. Patient transferred from MICU to floor. Blood cultures now growing Serratia. 69 yo M with PMhx of CAD s/p CABG (course at that time complicated by pleural effusion requiring chest tubes), ESRD on HD (TTS), DMII, HTN, afib on coumadin, and anemia transferred from [facility] for evaluation of chest pain after HD and GIB s/p 2U PRBC transfusion and aflutter/afib with RVR managed with metoprolol. Admitted to MICU for hypotension dependent on pressors likely 2/2 to sepsis. In MICU, patient had a cardiac arrest on 7/13 requiring CPR, 3x epi, and 2x shock (for VTach). Course complicated by recent drop in hgb requiring pRBCs. CT of leg shows hematoma on right adductor. Patient lethargic, suggesting compromised mental status. AMS likely 2/2 R frontal CVA and anoxic brain injury in the setting of cardiac arrest. Patient transferred from MICU to floor. Blood cultures now growing Serratia. After discussion with son (HCP), decision is now to focus on comfort for Mr. Hardin.

## 2021-08-01 PROCEDURE — 99232 SBSQ HOSP IP/OBS MODERATE 35: CPT | Mod: GC

## 2021-08-01 RX ADMIN — PANTOPRAZOLE SODIUM 40 MILLIGRAM(S): 20 TABLET, DELAYED RELEASE ORAL at 05:19

## 2021-08-01 RX ADMIN — PANTOPRAZOLE SODIUM 40 MILLIGRAM(S): 20 TABLET, DELAYED RELEASE ORAL at 17:13

## 2021-08-01 RX ADMIN — CHLORHEXIDINE GLUCONATE 1 APPLICATION(S): 213 SOLUTION TOPICAL at 13:42

## 2021-08-01 NOTE — PROGRESS NOTE ADULT - PROBLEM SELECTOR PLAN 4
Patient with right frontal infarct found on CT, most recent CTH demonstrates no new acute pathology, only evolving R infarct  - Etiology unknown, but potentially in setting of cardiac arrest vs AF vs septic emboli  - Patient being held off on AC at this time per cardio as unable to tolerate PO

## 2021-08-01 NOTE — PROGRESS NOTE ADULT - ASSESSMENT
69 yo M with PMhx of CAD s/p CABG (course at that time complicated by pleural effusion requiring chest tubes), ESRD on HD (TTS), DMII, HTN, afib on coumadin, and anemia transferred from [facility] for evaluation of chest pain after HD and GIB s/p 2U PRBC transfusion and aflutter/afib with RVR managed with metoprolol. Admitted to MICU for hypotension dependent on pressors likely 2/2 to sepsis. In MICU, patient had a cardiac arrest on 7/13 requiring CPR, 3x epi, and 2x shock (for VTach). Course complicated by recent drop in hgb requiring pRBCs. CT of leg shows hematoma on right adductor. Patient lethargic, suggesting compromised mental status. AMS likely 2/2 R frontal CVA and anoxic brain injury in the setting of cardiac arrest. Patient transferred from MICU to floor. Blood cultures now growing Serratia. After discussion with son (HCP), decision is now to focus on comfort for Mr. Hardin.

## 2021-08-01 NOTE — PROGRESS NOTE ADULT - ASSESSMENT
71 yo M with PMhx of CAD s/p CABG (course at that time complicated by pleural effusion requiring chest tubes), ESRD on HD (MWF), DMII, HTN, afib on coumadin, and anemia presented from [facility] for evaluation of chest pain and GIB.       left sided pleural effusion: loculated and chronic:   CAD:  CABG:  ESRD:   DM:  HTN:  A fibrillation:   GI Bleed    7/7:    left sided pleural effusion: loculated and chronic: he has chr loculated pleural effusion on left side: He had chest tube placement in last June 2020 at another hospital: no chemistry is available but he had negative cytology at that time: This time the effusion seems slightly worse and has loculated effusion with pleural thickening: heis not SOB andhasbeen on roomair: I think , it at all, if this effusion needs to be dealt with : it is probably vats: will contact thoracic surgery : etiology not very clear as there is no previous chemistry: coult be exudative or transudatve: he is on HD and now it is a chr christiano effusion   CAD: cont per cards  CABG: cont current meds  ESRD: on HD   DM: monitor and control  HTN: controlled  A fibrillation: off ac:   GI Bleed: per gi :  DW pt and t eam1    7/8:    left sided pleural effusion:: fever: s/p left sided chest tube placement: cultures sent: however with pr criteria it seems transudative await serum LDH but pleural fluid LDH is low:  already started on broad spectrum antibiotics ID to see: await cultures:   CAD: cont per cards  CABG: cont current meds  ESRD: on HD   DM: monitor and control  HTN: controlled  A fibrillation: off ac:   GI Bleed: per gi :  DW pmd    7/9:    left sided pleural effusion:: fever: s/p left sided chest tube placement: cultures sent: however with pr criteria it seems borderline transudative and transudative by LDH criteria: the vulutres ahve been negative so far:  already started on broad spectrum antibiotics ID to see: await cultures: ? source of spesis  CAD: cont per cards  CABG: cont current meds  ESRD: on HD   DM: monitor and control  HTN: in shock: now on vasopressors:   A fibrillation: off ac:   GI Bleed: per gi :  DW pmd  micu team    7/11:      left sided pleural effusion:: fever: s/p left sided chest tube placement: cultures sent: however with pr criteria it seems borderline transudative and transudative by LDH criteria: the cultures have been negative so far:  already started on broad spectrum antibiotics : Has MSSA bactermia  CAD: cont per cards: still on vasopressors: wean as tolerated ? needs molly ro tule out IE : would defer to cards:   CABG: cont current meds  ESRD: on HD   DM: monitor and control  HTN: in shock: now on vasopressors:   A fibrillation: off ac:   GI Bleed: per gi :  DW pmd  micu team    7/12:    left sided pleural effusion:: fever: s/p left sided chest tube placement:- now removed: cultures sent: however with pr criteria it seems borderline transudative and transudative by LDH criteria: the cultures have been negative so far:  already started on broad spectrum antibiotics : Has MRSA bactermia: vanco restarted yesterday ? echo/molly  Ac hypercarbic resp fialure: needs to be watched closelyin MICU : needs bipap may need intubation" At this time he is alert and awake: and responds to simple questions:    CAD: cont per cards: still on vasopressors: wean as tolerated ? needs molly ro tule out IE : would defer to cards:   CABG: cont current meds  ESRD: on HD   DM: monitor and control  HTN: in shock: now on vasopressors:   A fibrillation: off ac:   GI Bleed: per gi :  DW pmd  cards and bedstaff    7/13:  left sided pleural effusion:: fever: s/p left sided chest tube placement:- now removed: cultures sent: however with pr criteria it seems borderline transudative and transudative by LDH criteria: the cultures have been negative so far:  already started on broad spectrum antibiotics : Has MRSA bacteremia vanco restarted yesterday ? echo/molly  Ac hypercarbic resp fialure: now intubated s/p acls protocol last night   CAD: cont per cards: still on vasopressors: wean as tolerated ? needs molly ro tule out IE : would defer to cards:   CABG: cont current meds  ESRD: on HD   DM: monitor and control  HTN: in shock: now on vasopressors:   A fibrillation: off ac:   GI Bleed: per gi :  DELLA  staff  pt is critically ill now:     7/14:  left sided pleural effusion:: fever: s/p left sided chest tube placement:- now removed: cultures sent: however with pr criteria it seems borderline transudative and transudative by LDH criteria: the cultures have been negative so far:  already started on broad spectrum antibiotics : Has MRSA bacteremia vanco restarted yesterday now for  echo/molly  Ac hypercarbic resp fialure: now intubated s/p acls protocol last night -s/p cardiac arrest: ROSC 10 mts:   CAD: cont per cards: still on vasopressors: wean as tolerated ? needs molly ro tule out IE : would defer to cards: Cotn antbitioc  CABG: cont current meds  ESRD: on HD   DM: monitor and control  HTN: in shock: now on vasopressors:   A fibrillation: off ac:   GI Bleed: per gi :  DW  staff  pt is critically ill now: dw cards    7/15:    left sided pleural effusion:: transudative pl effusion with no with: certainly not the source for MRSA in blood:   Ac hypercarbic resp fialure: now intubated s/p acls protocol last night -s/p cardiac arrest: ROSC 10 mts:   CAD: cont per cards: still on vasopressors: wean as tolerated ? needs molly ro tule out IE : would defer to cards: Cotn antbitioc  CABG: cont current meds  ESRD: on HD   DM: monitor and control  HTN: in shock: now on vasopressors:   A fibrillation: on ac:   GI Bleed: per gi :  DW  staff  pt is critically ill now:     7/16"  left sided pleural effusion:: transudative pl effusion with no with: certainly not the source for MRSA in blood: ?source for MRSA:   Ac hypercarbic resp fialure: now intubated s/p acls protocol last night -s/p cardiac arrest: ROSC 10 mts: he seems to respond off sedation:per MICU note:   CAD: cont per cards: still on vasopressors: wean as tolerated ? needs molly ro tule out IE : would defer to cards: Cotn antbitioc  CABG: cont current meds  ESRD: on HD   DM: monitor and control  HTN: in shock: now on vasopressors:   A fibrillation: on ac:   GI Bleed: per gi :  DW  staff: PMD  pt is critically ill now:      7/17:    left sided pleural effusion:: transudative pl effusion with no with: certainly not the source for MRSA in blood: ?source for MRSA: for molly on monday  Ac hypercarbic resp fialure: now intubated s/p acls protocol last night -s/p cardiac arrest: ROSC 10 mts:   CAD: cont per cards: still on vasopressors: wean as tolerated ? needs molly ro tule out IE : would defer to cards: Cont antibiotics:   CABG: cont current meds  ESRD: on HD   DM: monitor and control  HTN: in shock: now on vasopressors:   A fibrillation: on ac:   GI Bleed: per gi :  DW  staff: PMD  pt is critically ill now:    7/18:    left sided pleural effusion:: transudative pl effusion with no with: certainly not the source for MRSA in blood: ?source for MRSA: for molly on monday: he is off sedation   Ac hypercarbic resp fialure: now intubated s/p acls protocol last night -s/p cardiac arrest: ROSC 10 mts:   Acute cva: noted on ct scan head: neurology following: already on heparin   CAD: cont per cards: still on vasopressors: wean as tolerated ? needs molly ro tule out IE : would defer to cards: Cont antibiotics:   CABG: cont current meds  ESRD: on HD   DM: monitor and control  HTN: in shock: now on vasopressors:   A fibrillation: on ac:   GI Bleed: per gi :  pt is critically ill now:    7/19:      left sided pleural effusion:: transudative pl effusion with no with: certainly not the source for MRSA in blood: ?source for MRSA: for molly today: he is off sedation but still on pressors:   Ac hypercarbic resp fialure: now intubated s/p acls protocol last night -s/p cardiac arrest: ROSC 10 mts:   Acute cva: noted on ct scan head: neurology following: already on heparin   CAD: cont per cards: still on vasopressors: wean as tolerated ? needs molly ro tule out IE : would defer to cards: Cont antibiotics: for molly today  CABG: cont current meds  ESRD: on HD   DM: monitor and control  HTN: in shock: now on vasopressors:   A fibrillation: on ac:   GI Bleed: per gi :  pt is critically ill now:    7/20  Acute Hypercarbic Respiratory Failure  -S/p RRT, tx to MICU 7/8, intubated 7/13 s/p cardiac arrest w/ ROSC  -CPAP trials, ventilator management per MICU team  -Pulmonary toileting, suction PRN   L pleural effusion   -Loculated L pleural effusion on CT chest, s/p CT placement by thoracic 7/8  -Transudative effusion, Cx negative   -CT since removed   -Repeat CT chest with small b/l partially loculated effusions, adjacent atelectasis  Bacteremia  -BC 7/9 MRSA+  -ABX per ID  -Repeat BC NGTD   -F/u cardiology recs regarding MOLLY   CAD (s/p CABG) - per cards   ESRD - HD per renal   GI bleed - GI recs noted     7/21  Acute Hypercarbic Respiratory Failure  -S/p RRT, tx to MICU 7/8, intubated 7/13 s/p cardiac arrest w/ ROSC  -CPAP trials, ventilator management per MICU team  -Pulmonary toileting, suction PRN   L pleural effusion   -Loculated L pleural effusion on CT chest, s/p CT placement by thoracic 7/8  -Transudative effusion, Cx negative   -CT since removed   -Repeat CT chest with small b/l partially loculated effusions, adjacent atelectasis  Bacteremia  -BC 7/9 MRSA+  -ABX per ID  -Repeat BC NGTD   -MOLLY now with MV vegetation per cards  -Pending CT lumbar spine   -F/u CT surgery recs  CAD (s/p CABG) - per cards   ESRD - HD per renal   GI bleed - GI recs noted     7/22  Acute Hypercarbic Respiratory Failure  --S/p RRT, tx to MICU 7/8, intubated 7/13 s/p cardiac arrest w/ ROSC-10 mts  --CPAP trials, ventilator management per MICU team  --Pulmonary toileting, suction PRN   L pleural effusion   --Loculated L pleural effusion on CT chest, s/p CT placement by thoracic 7/8  --Transudative effusion, Cx negative   --CT since removed   --Repeat CT chest with small b/l partially loculated effusions, adjacent atelectasis  Bacteremia  --BC 7/9 MRSA+, repeat BC NGTD   --MOLLY now with MV vegetation, not a candidate for surgery per CTS  --CT lumbar spine with no evidence of discitis   --ABX per ID  DVT prophylaxis  --AC held due to drop in H/H  --Concern for R thigh hematoma, vascular recs noted   CAD (s/p CABG) - per cards   ESRD - HD per renal   GI bleed - resolved, GI recs noted   Care per MICU team     7/23  Acute Hypercarbic Respiratory Failure  --S/p RRT, tx to MICU 7/8, intubated 7/13 s/p cardiac arrest w/ ROSC-10 mts  --Tolerating CPAP trials, plan to extubate per RN   --Ventilator management per MICU team  --Pulmonary toileting, suction PRN   L pleural effusion   --Loculated L pleural effusion on CT chest, s/p CT placement by thoracic 7/8  --Transudative effusion, Cx negative   --CT since removed   --Repeat CT chest with small b/l partially loculated effusions, adjacent atelectasis  Bacteremia  --BC 7/9 MRSA+, repeat BC NGTD   --MOLLY with MV vegetation, not a candidate for surgery per CTS  --CT lumbar spine with no evidence of discitis   --ABX per ID  DVT prophylaxis  --AC held due to drop in H/H  --Concern for R thigh hematoma, vascular recs noted   CAD (s/p CABG) - per cards   ESRD - HD per renal   GI bleed - resolved, GI recs noted   Care per MICU team     7/24:    Acute Hypercarbic Respiratory Failure : S/p RRT, tx to MICU 7/8, intubated 7/13 s/p cardiac arrest w/ ROSC-10 mts extubated now: alert and awake:   L pleural effusion : Loculated L pleural effusion on CT chest, s/p CT placement by thoracic 7/8: Transudative effusion, Cx negative : CT since removed : Repeat CT chest with small b/l partially loculated effusions, adjacent atelectasis  Bacteremia: -serratia: now: ij new blood cultures cont antibiotics : MOLLY with MV vegetation, not a candidate for surgery per CTS  --CT lumbar spine with no evidence of discitis   --ABX per ID  DVT prophylaxis : has hematoma in adductionr muscles: AC held due to drop in H/H  CAD (s/p CABG) - per cards   ESRD - HD per renal   GI bleed - resolved, GI recs noted   Care per MICU team : della resident today     7/25:    Acute Hypercarbic Respiratory Failure : S/p RRT, tx to MICU 7/8, intubated 7/13 s/p cardiac arrest w/ ROSC-10 mts extubated now: todays he is lethargic abg being done:   L pleural effusion : Loculated L pleural effusion on CT chest, s/p CT placement by thoracic 7/8: Transudative effusion, Cx negative : CT since removed : Repeat CT chest with small b/l partially loculated effusions, adjacent atelectasis  Bacteremia: -serratia: now: ij new blood cultures cont antibiotics : MOLLY with MV vegetation, not a candidate for surgery per CTS: CT lumbar spine with no evidence of discitis : ABX per ID  DVT prophylaxis : has hematoma in adductor muscles: AC held due to drop in H/H  CAD (s/p CABG) - per cards   ESRD - HD per renal   GI bleed - resolved, GI recs noted   Care per MICU team : dw resident today too!    7/26:    Acute Hypercarbic Respiratory Failure : S/p RRT, tx to MICU 7/8, intubated 7/13 s/p cardiac arrest w/ ROSC-10 mts extubated now: todays he is lethargic abg from yesterday  noted: pretty reasonable   L pleural effusion : Loculated L pleural effusion on CT chest, s/p CT placement by thoracic 7/8: Transudative effusion, Cx negative : CT since removed : Repeat CT chest with small b/l partially loculated effusions, adjacent atelectasis  Bacteremia: -serratia: now: ij new blood cultures cont antibiotics : MOLLY with MV vegetation, not a candidate for surgery per CTS: CT lumbar spine with no evidence of discitis : ABX per ID  DVT prophylaxis : has hematoma in adductor muscles: AC held due to drop in H/H  CAD (s/p CABG) - per cards   ESRD - HD per renal   GI bleed - resolved, GI recs noted   Care per MICU team :     7/27  Acute Hypercarbic Respiratory Failure   -S/p RRT, tx to MICU 7/8, intubated 7/13 s/p cardiac arrest w/ ROSC (10 min)  -Extubated now, ABG noted  -O2 sats % on 2LNC  -Chest PT   L pleural effusion   -Loculated L pleural effusion on CT chest, s/p CT placement by thoracic 7/8 (Transudative effusion, Cx negative)  -CT since removed   -Repeat CT chest with small b/l partially loculated effusions, adjacent atelectasis  Bacteremia  -BC 7/9 MRSA+, now BC 7/24 +Serratia  -MOLLY with MV vegetation  -ABX per ID   -ID recs noted  DVT prophylaxis  -remains off AC per MICU team   CAD (s/p CABG) - per cards   ESRD - HD per renal   GI bleed - resolved, GI recs noted   Palliative care recs noted, overall prognosis guarded     7/29  Acute Hypercarbic Respiratory Failure   -S/p RRT, tx to MICU 7/8, intubated 7/13 s/p cardiac arrest w/ ROSC (10 min)  -Extubated now, now transferred from MICU to floor  -Seen on 4LNC with O2 sat 100%, can wean O2 as tolerated  -Check ABG  -Aspiration precautions  -Pending CT A/P with IV contrast, if creatinine allows can also check CT chest   L pleural effusion   -Loculated L pleural effusion on CT chest, s/p CT placement by thoracic 7/8 (Transudative effusion, Cx negative)  -CT since removed   -Repeat CT chest with small b/l partially loculated effusions, adjacent atelectasis  -F/u CT chest with contrast if able   Bacteremia  -BC 7/9 MRSA+, now BC 7/24 +Serratia  -MOLLY with MV vegetation  -ABX per ID   -ID recs noted  -F/u CT  DVT prophylaxis  -remains off AC at this time  -H/H stable, cardiology recs noted  Dysphagia   -S/S consult  -If unable to tolerate PO, f/u GOC conversation with family regarding enteral feeds  CAD (s/p CABG) - per cards   ESRD - HD per renal   Palliative care recs noted, overall prognosis guarded   D/w ACP    7/30:    Acute Hypercarbic Respiratory Failure   -S/p RRT, tx to MICU 7/8, intubated 7/13 s/p cardiac arrest w/ ROSC (10 min)  -Extubated now, now transferred from MICU to floor  -Seen on 4LNC with O2 sat 100%, can wean O2 as tolerated  -Check ABG  -Aspiration precautions  repeat ct chest noed; suspicious for pneumonia on left side: he is aleready on cefepime as well as vanco: della team : to discuss with ID to see if any antibtiocs needed to be changed    L pleural effusion   -Loculated L pleural effusion on CT chest, s/p CT placement by thoracic 7/8 (Transudative effusion, Cx negative)  -CT since removed   -Repeat CT chest with small b/l partially loculated effusions, adjacent atelectasis  Bacteremia  -BC 7/9 MRSA+, now BC 7/24 +Serratia  -MOLLY with MV vegetation  -ABX per ID   -ID recs noted  DVT prophylaxis  -remains off AC at this time  -H/H stable, cardiology recs noted  Dysphagia   -S/S consult  -If unable to tolerate PO, f/u GOC conversation with family regarding enteral feeds  CAD (s/p CABG) - per cards   ESRD - HD per renal   Palliative care recs noted, overall prognosis guarded   D/w ACP    8/1:    Acute Hypercarbic Respiratory Failure : for comfort care: on pleasure feeds:   -S/p RRT, tx to MICU 7/8, intubated 7/13 s/p cardiac arrest w/ ROSC (10 min)  -Seen on 4LNC with O2 sat 100%, can wean O2 as tolerated  -Aspiration precautions  off antibiotics now plan for comfort care:     L pleural effusion   -Loculated L pleural effusion on CT chest, s/p CT placement by thoracic 7/8 (Transudative effusion, Cx negative)  -CT since removed   Bacteremia  -BC 7/9 MRSA+, now BC 7/24 +Serratia  -MOLLY with MV vegetation   on no antibiotics now: stopped today   DVT prophylaxis  -remains off AC at this time  -H/H stable, cardiology recs noted  Dysphagia   -S/S consult  -If unable to tolerate PO, f/u GOC conversation with family regarding enteral feeds  CAD (s/p CABG) - per cards   ESRD - HD per renal   Palliative care recs noted, overall prognosis guarded   D/w resident: for comfort care: in pt hospice:

## 2021-08-01 NOTE — PROGRESS NOTE ADULT - SUBJECTIVE AND OBJECTIVE BOX
Date of Service: 08-01-21 @ 11:51    Patient is a 70y old  Male who presents with a chief complaint of Chest pain and GIB (01 Aug 2021 07:26)      Any change in ROS: seems to be stable he is alert and responds to simple commands to day     MEDICATIONS  (STANDING):  chlorhexidine 2% Cloths 1 Application(s) Topical daily  pantoprazole  Injectable 40 milliGRAM(s) IV Push two times a day    MEDICATIONS  (PRN):  acetaminophen   Tablet .. 1000 milliGRAM(s) Oral every 6 hours PRN Temp greater or equal to 38C (100.4F), Mild Pain (1 - 3), Moderate Pain (4 - 6)  calamine/zinc oxide Lotion 1 Application(s) Topical three times a day PRN Itching  HYDROmorphone  Injectable 0.5 milliGRAM(s) IV Push every 2 hours PRN pain or dyspnea  metoprolol tartrate Injectable 5 milliGRAM(s) IV Push every 6 hours PRN HR>100 and/or SBP>140    Vital Signs Last 24 Hrs  T(C): 36.3 (01 Aug 2021 05:33), Max: 36.8 (31 Jul 2021 13:32)  T(F): 97.3 (01 Aug 2021 05:33), Max: 98.3 (31 Jul 2021 13:32)  HR: 91 (01 Aug 2021 05:33) (86 - 95)  BP: 124/62 (01 Aug 2021 05:33) (114/57 - 161/81)  BP(mean): 78 (01 Aug 2021 05:33) (78 - 78)  RR: 17 (01 Aug 2021 05:33) (17 - 19)  SpO2: 93% (01 Aug 2021 05:33) (93% - 100%)    I&O's Summary        Physical Exam:   GENERAL: NAD, well-groomed, well-developed  HEENT: KATHY/   Atraumatic, Normocephalic  ENMT: No tonsillar erythema, exudates, or enlargement; Moist mucous membranes, Good dentition, No lesions  NECK: Supple, No JVD, Normal thyroid  CHEST/LUNG: scattered rhinchi  CVS: Regular rate and rhythm; No murmurs, rubs, or gallops  GI: : Soft, Nontender, Nondistended; Bowel sounds present  NERVOUS SYSTEM:  Alert & awake  EXTREMITIES: mild edema  LYMPH: No lymphadenopathy noted  SKIN: No rashes or lesions  ENDOCRINOLOGY: No Thyromegaly  PSYCH: calm+    Labs:                              9.3    12.27 )-----------( 288      ( 30 Jul 2021 07:51 )             33.8                         9.2    11.57 )-----------( 259      ( 29 Jul 2021 07:09 )             31.9     07-30    143  |  98  |  24<H>  ----------------------------<  112<H>  4.2   |  26  |  3.71<H>  07-29    141  |  99  |  39<H>  ----------------------------<  124<H>  3.9   |  24  |  5.39<H>      TPro  6.9  /  Alb  2.5<L>  /  TBili  2.5<H>  /  DBili  x   /  AST  53<H>  /  ALT  35  /  AlkPhos  527<H>  07-30  TPro  6.2  /  Alb  2.2<L>  /  TBili  2.3<H>  /  DBili  x   /  AST  58<H>  /  ALT  35  /  AlkPhos  454<H>  07-29    CAPILLARY BLOOD GLUCOSE      POCT Blood Glucose.: 142 mg/dL (31 Jul 2021 17:23)  POCT Blood Glucose.: 92 mg/dL (31 Jul 2021 12:03)    r< from: CT Chest w/ IV Cont (07.30.21 @ 12:10) >  CHEST WALL AND LOWER NECK: Small partially calcified right thyroid nodule.    ABDOMEN AND PELVIS:  LIVER: Within normal limits.  BILE DUCTS: Normal caliber.  GALLBLADDER: Within normal limits.  SPLEEN: Normal size. New wedge shaped hypodensity in the spleen, concerning for splenic infarct.  PANCREAS: Within normal limits.  ADRENALS: Within normal limits.  KIDNEYS/URETERS: Bilateral renal cortical thinning. No hydronephrosis. Subcentimeter left renal hypodensity, likely a cyst.    BLADDER: Within normal limits.  REPRODUCTIVE ORGANS: Prostate within normal limits.    BOWEL: No bowel obstruction. Appendix is normal.  PERITONEUM: Trace ascites.  VESSELS: Atherosclerotic calcifications.  RETROPERITONEUM/LYMPH NODES: No lymphadenopathy.  ABDOMINAL WALL: Anasarca. Partially imaged right medial thigh hematoma correspond with findings present on prior examination 7/21/2021.  BONES: Within normal limits.    IMPRESSION:  *  Moderate left pleural effusion and bibasilar atelectasis, left greater than right.  *  Focal hypoenhancement and a portion of the atelectatic lung, concerning for superimposed pneumonia.  *  Partially imaged right medial thigh hematoma, corresponding with findings present on prior examination 7/21/2021.    --- End of Report ---              BENJAMIN GARCIA MD; Attending Radiologist  This document has been electronically signed. Jul 30 2021 12:48PM    < end of copied text >                RECENT CULTURES:  07-29 @ 14:39 .Blood Blood-Peripheral                No growth to date.    07-29 @ 10:31 .Blood Blood-Peripheral                No growth to date.          RESPIRATORY CULTURES:          Studies  Chest X-RAY  CT SCAN Chest   Venous Dopplers: LE:   CT Abdomen  Others

## 2021-08-01 NOTE — PROGRESS NOTE ADULT - ATTENDING COMMENTS
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, f/up repeat blood cultures from 7/29, not a surgical candidate, s/p IV Vanco and Cefepime, d/c antibiotics as it will not change outcome, plan for comfort care   Septic shock d/t MRSA/serratia bacteremia, shock resolved s/p pressors, discontinued Midodrine   Acute embolic stroke, holding anticoagulation d/t R abductor muscle hematoma and recent questionable GI bleed   Acute blood loss anemia d/t R adductor muscle hematoma w/o active extravasation, s/p 3 units PRBC total, held anticoagulation   Acute encephalopathy d/t acute CVA and possible anoxic brain injury post cardiac arrest, MRI brain unlikely to , HCP wants comfort care   Acute hypoxic respiratory failure d/t aspiration pnrumonia, s/p intubation and now extubated, c/w supplemental O2, s/p IV Vanco and Cefepime for possible MRSA or gram negative PNA, stared on Dilaudid IV prn   Paroxysmal Afib, holding Coumadin d/t hematoma and ?GI bleed, c/w IV Lopressor   ESRD, intermittent hypotension during hemodialysis, dialysis stopped as per HCP's wishes   Dysphagia, failed dysphagia screen and high risk of aspiration, plan is for pleasure feeds and comfort care   DNR/DNI, plan for inpatient hospice referral

## 2021-08-01 NOTE — PROGRESS NOTE ADULT - PROBLEM SELECTOR PLAN 1
- DNR/DNI  - Palliative following, appreciate recs   - Decision is for comfort care- no HD, no blood draws. Focus on symptom directed care.   - Discontinue NPO. Start pleasure feeds   - Pain: IV dilaudid 0.5mg q2h for dyspnea or pain  - Son is interested in inpatient hospice and would not want home hospice as it would take a toll on his mother's health

## 2021-08-01 NOTE — PROGRESS NOTE ADULT - NSPROGADDITIONALINFOA_GEN_ALL_CORE
Son (HCP) is interested in inpatient hospice and would not want home hospice as it would take a toll on his mother's health. He would also like to take a look at options available for inpatient hospice. Will discuss with case management
Son (HCP) is interested in inpatient hospice and would not want home hospice as it would take a toll on his mother's health. He would also like to take a look at options available for inpatient hospice. Will discuss with case management

## 2021-08-01 NOTE — PROGRESS NOTE ADULT - PROBLEM SELECTOR PLAN 5
Sinus rhythm currently  - In setting of NPO status, switch from PO diltiazem IV lopressor 5mg for HR > 100 and/or SBP >140  - Cardiology following Sinus rhythm currently  - In setting of NPO status, switch from PO diltiazem IV lopressor 5mg for HR > 100 and/or SBP >140

## 2021-08-01 NOTE — PROGRESS NOTE ADULT - SUBJECTIVE AND OBJECTIVE BOX
DATE OF SERVICE: 08-01-21    Patient denies chest pain or shortness of breath.   Review of symptoms otherwise negative.    acetaminophen   Tablet .. 1000 milliGRAM(s) Oral every 6 hours PRN  calamine/zinc oxide Lotion 1 Application(s) Topical three times a day PRN  chlorhexidine 2% Cloths 1 Application(s) Topical daily  HYDROmorphone  Injectable 0.5 milliGRAM(s) IV Push every 2 hours PRN  metoprolol tartrate Injectable 5 milliGRAM(s) IV Push every 6 hours PRN  pantoprazole  Injectable 40 milliGRAM(s) IV Push two times a day          Hemoglobin: 9.3 g/dL (07-30 @ 07:51)  Hemoglobin: 9.2 g/dL (07-29 @ 07:09)  Hemoglobin: 8.4 g/dL (07-28 @ 07:33)            Creatinine Trend: 3.71<--, 5.39<--, 4.14<--, 6.08<--, 5.06<--, 4.13<--    COAGS:           T(C): 36.6 (08-01-21 @ 13:43), Max: 36.8 (07-31-21 @ 22:17)  HR: 88 (08-01-21 @ 13:43) (88 - 95)  BP: 125/59 (08-01-21 @ 13:43) (114/57 - 125/59)  RR: 18 (08-01-21 @ 13:43) (17 - 19)  SpO2: 100% (08-01-21 @ 13:43) (93% - 100%)  Wt(kg): --    I&O's Summary    Gen: NAD  HEENT:  (-)icterus (-)pallor  CV: N S1 S2 1/6 DEJAH (+)2 Pulses B/l  Resp:  Clear to auscultation B/L, normal effort  GI: (+) BS Soft, NT, ND  Lymph:  (-)Edema, (-)obvious lymphadenopathy  Skin: Warm to touch, Normal turgor  Psych:  unable to adequately assess mood and affect    DATA  < from: Transthoracic Echocardiogram (07.07.21 @ 18:17) >  ------------------------------------------------------------------------  CONCLUSIONS:  1. Mitral annular calcification, otherwise normal mitral  valve. Minimal mitral regurgitation.  2. Endocardium not well visualized; grossly normal left  ventricular systolic function.  3. Right ventricular enlargement with decreased right  ventricular systolic function.  4. Inadequate tricuspid regurgitation Doppler envelope  precludes estimation of RVSP.  ------------------------------------------------------------------------  Confirmed on  7/7/2021 - 21:16:20 by Osvaldo Bertrand M.D.,  [facility], [facility]    < end of copied text >      A/P) 69 y/o male PMH CAD s/p CABG and old PCI, AFL x 1 year on coumadin, HTN, hyperlipidemia, DM, ESRD on HD, and hypothyroidism originally admitted to [facility] with GIB, transferred to [facility], found to have septic shock and MRSA bacteremia, trx to MICU s/p PEA arrest, intubated and now extubated    -Given MRSA bacteremia, TRUMAN performed demonstrating MV endocarditis - discussed with CTS Dr. Pierce - pt. not a surgical candidate at this time   -Abx per ID, blood cx now + for serratia, pending CT C/A/P  -CTH head noted - pt. with cva  -Right thigh hematoma noted on CT scan - appreciate vascular evaluation - no surgical intervention required by vascular at this time - monitor h/h  -H/H remains stable    -Follow up palliative care, pt is DNR  -HD per renal   -Remains NPO    -No further inpatient cardiac w/u expected       Murphy Colin MD, [facility]  BEEPER [phone]

## 2021-08-01 NOTE — PROGRESS NOTE ADULT - SUBJECTIVE AND OBJECTIVE BOX
PROGRESS NOTE:     Patient is a 70y old  Male who presents with a chief complaint of Chest pain and GIB (01 Aug 2021 07:26)      SUBJECTIVE / OVERNIGHT EVENTS: No acute events overnight. Remained comfortable. Received dilaudid 0.5 yesterday evening.     ADDITIONAL REVIEW OF SYSTEMS: 10 point ROS negative except per HPI    MEDICATIONS  (STANDING):  chlorhexidine 2% Cloths 1 Application(s) Topical daily  pantoprazole  Injectable 40 milliGRAM(s) IV Push two times a day    MEDICATIONS  (PRN):  acetaminophen   Tablet .. 1000 milliGRAM(s) Oral every 6 hours PRN Temp greater or equal to 38C (100.4F), Mild Pain (1 - 3), Moderate Pain (4 - 6)  calamine/zinc oxide Lotion 1 Application(s) Topical three times a day PRN Itching  HYDROmorphone  Injectable 0.5 milliGRAM(s) IV Push every 2 hours PRN pain or dyspnea  metoprolol tartrate Injectable 5 milliGRAM(s) IV Push every 6 hours PRN HR>100 and/or SBP>140      CAPILLARY BLOOD GLUCOSE      POCT Blood Glucose.: 142 mg/dL (31 Jul 2021 17:23)  POCT Blood Glucose.: 92 mg/dL (31 Jul 2021 12:03)    I&O's Summary      PHYSICAL EXAM:  Vital Signs Last 24 Hrs  T(C): 36.3 (01 Aug 2021 05:33), Max: 36.8 (31 Jul 2021 13:32)  T(F): 97.3 (01 Aug 2021 05:33), Max: 98.3 (31 Jul 2021 13:32)  HR: 91 (01 Aug 2021 05:33) (86 - 95)  BP: 124/62 (01 Aug 2021 05:33) (114/57 - 161/81)  BP(mean): 78 (01 Aug 2021 05:33) (78 - 78)  RR: 17 (01 Aug 2021 05:33) (17 - 19)  SpO2: 93% (01 Aug 2021 05:33) (93% - 100%)    CONSTITUTIONAL: Man laying in bed in NAD, appears comfortable   ENMT: Moist oral mucosa, icteric sclera  RESPIRATORY: diminished respiratory effort; lungs are clear to auscultation bilaterally, on 3L NC for comfort   CARDIOVASCULAR: Regular rate and rhythm, normal S1 and S2,  LE pitting edema. Peripheral pulses are 1+ bilaterally  ABDOMEN: Soft, nontender to palpation, normoactive bowel sounds   NEUROLOGY: Patient opens eyes to voice, able to tell me his name. Unable to assess strength       LABS:        -----    MICRO      Culture - Blood (collected 29 Jul 2021 14:39)  Source: .Blood Blood-Peripheral  Preliminary Report (30 Jul 2021 15:01):    No growth to date.    Culture - Blood (collected 29 Jul 2021 14:39)  Source: .Blood Blood-Peripheral  Preliminary Report (30 Jul 2021 15:01):    No growth to date.    Culture - Blood (collected 29 Jul 2021 10:31)  Source: .Blood Blood-Peripheral  Preliminary Report (30 Jul 2021 11:01):    No growth to date.      -----    TRENDS  Hemoglobin: 9.3 g/dL (07-30 @ 07:51)  Hemoglobin: 9.2 g/dL (07-29 @ 07:09)  Hemoglobin: 8.4 g/dL (07-28 @ 07:33)    Creatinine Trend: 3.71<--, 5.39<--, 4.14<--, 6.08<--, 5.06<--, 4.13<--  ----  RADIOLOGY & ADDITIONAL TESTS:  Results Reviewed:   Imaging Personally Reviewed:  Electrocardiogram Personally Reviewed:    COORDINATION OF CARE:  Care Discussed with Consultants/Other Providers [Y/N]:  Prior or Outpatient Records Reviewed [Y/N]:

## 2021-08-01 NOTE — PROGRESS NOTE ADULT - PROBLEM SELECTOR PLAN 2
Patient with blood cultures + for MRSA and Serratia  - C/W Vanc 750mg    - C/W Cefepime 1g qd  - Cultures from 7/29/21; NGTD Patient with blood cultures + for MRSA and Serratia  - s/p Vanc 750mg    - s/p Cefepime 1g qd  - Cultures from 7/29/21; NGTD

## 2021-08-02 ENCOUNTER — APPOINTMENT (OUTPATIENT)
Dept: VASCULAR SURGERY | Facility: CLINIC | Age: 71
End: 2021-08-02

## 2021-08-02 PROCEDURE — 99232 SBSQ HOSP IP/OBS MODERATE 35: CPT | Mod: GC

## 2021-08-02 PROCEDURE — 99233 SBSQ HOSP IP/OBS HIGH 50: CPT | Mod: GC

## 2021-08-02 RX ADMIN — HYDROMORPHONE HYDROCHLORIDE 0.5 MILLIGRAM(S): 2 INJECTION INTRAMUSCULAR; INTRAVENOUS; SUBCUTANEOUS at 19:36

## 2021-08-02 RX ADMIN — HYDROMORPHONE HYDROCHLORIDE 0.5 MILLIGRAM(S): 2 INJECTION INTRAMUSCULAR; INTRAVENOUS; SUBCUTANEOUS at 19:55

## 2021-08-02 RX ADMIN — CHLORHEXIDINE GLUCONATE 1 APPLICATION(S): 213 SOLUTION TOPICAL at 15:05

## 2021-08-02 RX ADMIN — PANTOPRAZOLE SODIUM 40 MILLIGRAM(S): 20 TABLET, DELAYED RELEASE ORAL at 05:32

## 2021-08-02 RX ADMIN — PANTOPRAZOLE SODIUM 40 MILLIGRAM(S): 20 TABLET, DELAYED RELEASE ORAL at 17:30

## 2021-08-02 NOTE — PROGRESS NOTE ADULT - ASSESSMENT
Agree with above assessment and plan as outlined above.    - No need for further inpatient cardiac work up.    Murphy Colin MD, [facility]  BEEPER [phone]

## 2021-08-02 NOTE — PROGRESS NOTE ADULT - PROBLEM SELECTOR PLAN 1
- DNR/DNI  - Palliative following, appreciate recs   - Decision is for comfort care- no HD, no blood draws. Focus on symptom-directed care   - Discontinue NPO. Start pleasure feeds   - Pain: IV dilaudid 0.5mg q2h for dyspnea or pain  - Son is interested in inpatient hospice and would not want home hospice as it would take a toll on his mother's health - DNR/DNI  - Palliative following, appreciate recs   - Decision is for comfort care- no HD, no blood draws. Focus on symptom-directed care   - Continue pleasure feeds   - Pain: IV dilaudid 0.5mg q2h for dyspnea or pain  - Son is interested in inpatient hospice and would not want home hospice as it would take a toll on his mother's health. Family deciding on which hospice center for pt.

## 2021-08-02 NOTE — PROGRESS NOTE ADULT - SUBJECTIVE AND OBJECTIVE BOX
Date of Service: 08-02-21 @ 10:21    Patient is a 70y old  Male who presents with a chief complaint of Chest pain and GIB (02 Aug 2021 07:25)    Any change in ROS:   Lethargic but arousable  Resting comfortably  O2 sats 93% on 4LNC    MEDICATIONS  (STANDING):  chlorhexidine 2% Cloths 1 Application(s) Topical daily  pantoprazole  Injectable 40 milliGRAM(s) IV Push two times a day    MEDICATIONS  (PRN):  acetaminophen   Tablet .. 1000 milliGRAM(s) Oral every 6 hours PRN Temp greater or equal to 38C (100.4F), Mild Pain (1 - 3), Moderate Pain (4 - 6)  calamine/zinc oxide Lotion 1 Application(s) Topical three times a day PRN Itching  HYDROmorphone  Injectable 0.5 milliGRAM(s) IV Push every 2 hours PRN pain or dyspnea  metoprolol tartrate Injectable 5 milliGRAM(s) IV Push every 6 hours PRN HR>100 and/or SBP>140    Vital Signs Last 24 Hrs  T(C): 36.9 (02 Aug 2021 05:33), Max: 37.1 (01 Aug 2021 21:18)  T(F): 98.5 (02 Aug 2021 05:33), Max: 98.8 (01 Aug 2021 21:18)  HR: 96 (02 Aug 2021 05:33) (88 - 98)  BP: 134/61 (02 Aug 2021 05:33) (125/59 - 134/61)  BP(mean): --  RR: 18 (02 Aug 2021 05:33) (18 - 18)  SpO2: 93% (02 Aug 2021 05:33) (93% - 100%)    Physical Exam:   GENERAL: NAD  HEENT: KATHY  ENMT: No tonsillar erythema, exudates, or enlargement  NECK: Supple, No JVD  CHEST/LUNG: Decreased BS, poor air entry  CVS: Regular rate and rhythm  GI: : Soft, Nontender, Nondistended  NERVOUS SYSTEM:  Lethargic, open eyes but not following commands   EXTREMITIES:  2+ Peripheral Pulses, +LE edema  LYMPH: No lymphadenopathy noted  SKIN: No rashes or lesions  PSYCH: Calm    Labs:                          9.3    12.27 )-----------( 288      ( 30 Jul 2021 07:51 )             33.8     07-30    143  |  98  |  24<H>  ----------------------------<  112<H>  4.2   |  26  |  3.71<H>      TPro  6.9  /  Alb  2.5<L>  /  TBili  2.5<H>  /  DBili  x   /  AST  53<H>  /  ALT  35  /  AlkPhos  527<H>  07-30    RECENT CULTURES:  07-29 @ 14:39 .Blood Blood-Peripheral     No growth to date.    07-29 @ 10:31 .Blood Blood-Peripheral     No growth to date.    Studies    CT SCAN Chest < from: CT Chest w/ IV Cont (07.30.21 @ 12:10) >  FINDINGS:  CHEST:  LUNGS AND LARGE AIRWAYS: Patent central airways. Improved aeration of the right lung base. Basilar passive atelectasis, left greater than right. Focal region of hypoenhancement in the compressed left lung (2:33), concerning for pneumonia.  PLEURA: Moderate left pleural effusion. Right pleural effusion has resolved.  VESSELS: Left subclavian venous stent. A linear filling defect in the left internal jugular vein measuring 3 mm in diameter likely reflects residual fibrin sheath from previously present left internal jugular central venous line.  HEART: Enlarged heart. No pericardial effusion.  MEDIASTINUM AND YOANA: No lymphadenopathy.  CHEST WALL AND LOWER NECK: Small partially calcified right thyroid nodule.    ABDOMEN AND PELVIS:  LIVER: Within normal limits.  BILE DUCTS: Normal caliber.  GALLBLADDER: Within normal limits.  SPLEEN: Normal size. New wedge shaped hypodensity in the spleen, concerning for splenic infarct.  PANCREAS: Within normal limits.  ADRENALS: Within normal limits.  KIDNEYS/URETERS: Bilateral renal cortical thinning. No hydronephrosis. Subcentimeter left renal hypodensity, likely a cyst.    BLADDER: Within normal limits.  REPRODUCTIVE ORGANS: Prostate within normal limits.    BOWEL: No bowel obstruction. Appendix is normal.  PERITONEUM: Trace ascites.  VESSELS: Atherosclerotic calcifications.  RETROPERITONEUM/LYMPH NODES: No lymphadenopathy.  ABDOMINAL WALL: Anasarca. Partially imaged right medial thigh hematoma correspond with findings present on prior examination 7/21/2021.  BONES: Within normal limits.    IMPRESSION:  *  Moderate left pleural effusion and bibasilar atelectasis, left greater than right.  *  Focal hypoenhancement and a portion of the atelectatic lung, concerning for superimposed pneumonia.  *  Partially imaged right medial thigh hematoma, corresponding with findings present on prior examination 7/21/2021.    < end of copied text >

## 2021-08-02 NOTE — PROGRESS NOTE ADULT - ASSESSMENT
69 yo M with PMhx of CAD s/p CABG (course at that time complicated by pleural effusion requiring chest tubes), ESRD on HD (MWF), T2DM, HTN, Afib on coumadin, and anemia transferred from [facility] for evaluation of chest pain after HD, suspected GIB s/p 2U PRBC transfusion and Afib with RVR.   Admitted to MICU due to sepsis requiting pressor support. S/p cardiac arrest on 7/13 ROSC achieved after approx. 10 minutes.   Palliative care team consulted due to complex medical care and goals of care discussion, requested by patient's.

## 2021-08-02 NOTE — PROGRESS NOTE ADULT - PROBLEM SELECTOR PLAN 5
Sinus rhythm currently  - In setting of NPO status, switch from PO diltiazem IV lopressor 5mg for HR > 100 and/or SBP >140

## 2021-08-02 NOTE — PROGRESS NOTE ADULT - SUBJECTIVE AND OBJECTIVE BOX
EP ATTENDING    no tele    patient is currently comfort care only, arrangements being made for inpatient hospice    comfortable, no distress, ROS difficult to obtain    DATE OF SERVICE - 08-02-21 @ 14:08    Review of Systems:   Constitutional: [ ] fevers, [ ] chills.   Skin: [ ] dry skin. [ ] rashes.  Psychiatric: [ ] depression, [ ] anxiety.   Gastrointestinal: [ ] BRBPR, [ ] melena.   Neurological: [ ] confusion. [ ] seizures. [ ] shuffling gait.   Ears,Nose,Mouth and Throat: [ ] ear pain [ ] sore throat.   Eyes: [ ] diplopia.   Respiratory: [ ] hemoptysis. [ ] shortness of breath  Cardiovascular: See HPI above  Hematologic/Lymphatic: [ ] anemia. [ ] painful nodes. [ ] prolonged bleeding.   Genitourinary: [ ] hematuria. [ ] flank pain.   Endocrine: [ ] significant change in weight. [ ] intolerance to heat and cold.     Review of systems [ ] otherwise negative, [x ] otherwise unable to obtain    FH: no family history of sudden cardiac death in first degree relatives    SH: [ ] tobacco, [ ] alcohol, [ ] drugs    acetaminophen   Tablet .. 1000 milliGRAM(s) Oral every 6 hours PRN  calamine/zinc oxide Lotion 1 Application(s) Topical three times a day PRN  chlorhexidine 2% Cloths 1 Application(s) Topical daily  HYDROmorphone  Injectable 0.5 milliGRAM(s) IV Push every 2 hours PRN  LORazepam   Injectable 0.5 milliGRAM(s) IV Push every 2 hours PRN  metoprolol tartrate Injectable 5 milliGRAM(s) IV Push every 6 hours PRN  pantoprazole  Injectable 40 milliGRAM(s) IV Push two times a day            T(C): 36.9 (08-02-21 @ 05:33), Max: 37.1 (08-01-21 @ 21:18)  HR: 96 (08-02-21 @ 05:33) (96 - 98)  BP: 134/61 (08-02-21 @ 05:33) (128/59 - 134/61)  RR: 18 (08-02-21 @ 05:33) (18 - 18)  SpO2: 93% (08-02-21 @ 05:33) (93% - 94%)  Wt(kg): --    I&O's Summary      Head: Normocephalic and atraumatic.   Neck: No JVD. No bruits. Supple. Does not appear to be enlarged.   Cardiovascular: + S1,S2 ; RRR Soft systolic murmur at the left lower sternal border. No rubs noted.    Lungs: CTA b/l. No rhonchi, rales or wheezes.   Abdomen: + BS, soft. Non tender. Non distended. No rebound. No guarding.   Extremities: No clubbing/cyanosis/edema.   Skin: Warm and moist. The patient's skin has normal elasticity and good skin turgor.         A/P) 69 y/o male PMH CAD s/p CABG, PAF/AFL on coumadin, HTN, hyperlipidemia, DM, ESRD on HD, hypothyroidism a/w sepsis from mitral valve endocarditis. Had a PEA arrest while in the hospital. Transferred out of MICU last week. Today he continues to be lethargic and difficult to arouse. Goals of care are now comfort only with plans for inpatient hospice.    -s/p PEA/jay/asystole arrest, secondary to critical illness and infection.  No pacemaker or ICD indicated at this time  -Endocarditis to be medically managed for now, refused by CTS for invasive management  -no further inpatient EP workup expected   -palliative follow up  -will sign off, please call with questions or concerns

## 2021-08-02 NOTE — PROGRESS NOTE ADULT - PROBLEM SELECTOR PLAN 2
Patient with blood cultures + for MRSA and Serratia  - s/p Vanc 750mg    - s/p Cefepime 1g qd  - Cultures from 7/29/21; NGTD Patient with blood cultures + for MRSA and Serratia  - s/p Vanc 750mg    - s/p Cefepime 1g qd  - Cultures from 7/29/21; NGTD  - Off abx, comfort care

## 2021-08-02 NOTE — PROGRESS NOTE ADULT - SUBJECTIVE AND OBJECTIVE BOX
DATE OF SERVICE: 08-02-21    S: Lethargic but in no distress, unable to obtain ROS.    Review of Systems:   Constitutional: [ ] fevers, [ ] chills.   Skin: [ ] dry skin. [ ] rashes.  Psychiatric: [ ] depression, [ ] anxiety.   Gastrointestinal: [ ] BRBPR, [ ] melena.   Neurological: [ ] confusion. [ ] seizures. [ ] shuffling gait.   Ears,Nose,Mouth and Throat: [ ] ear pain [ ] sore throat.   Eyes: [ ] diplopia.   Respiratory: [ ] hemoptysis. [ ] shortness of breath  Cardiovascular: See HPI above  Hematologic/Lymphatic: [ ] anemia. [ ] painful nodes. [ ] prolonged bleeding.   Genitourinary: [ ] hematuria. [ ] flank pain.   Endocrine: [ ] significant change in weight. [ ] intolerance to heat and cold.     Review of systems [ ] otherwise negative, [x ] otherwise unable to obtain    FH: no family history of sudden cardiac death in first degree relatives    SH: [ ] tobacco, [ ] alcohol, [ ] drugs    acetaminophen   Tablet .. 1000 milliGRAM(s) Oral every 6 hours PRN  calamine/zinc oxide Lotion 1 Application(s) Topical three times a day PRN  chlorhexidine 2% Cloths 1 Application(s) Topical daily  HYDROmorphone  Injectable 0.5 milliGRAM(s) IV Push every 2 hours PRN  metoprolol tartrate Injectable 5 milliGRAM(s) IV Push every 6 hours PRN  pantoprazole  Injectable 40 milliGRAM(s) IV Push two times a day      T(C): 36.9 (08-02-21 @ 05:33), Max: 37.1 (08-01-21 @ 21:18)  HR: 96 (08-02-21 @ 05:33) (88 - 98)  BP: 134/61 (08-02-21 @ 05:33) (125/59 - 134/61)  RR: 18 (08-02-21 @ 05:33) (18 - 18)  SpO2: 93% (08-02-21 @ 05:33) (93% - 100%)  Wt(kg): --    I&O's Summary    Gen: NAD  HEENT:  (-)icterus (-)pallor  CV: N S1 S2 1/6 DEJAH (+)2 Pulses B/l  Resp:  Clear to auscultation B/L, normal effort  GI: (+) BS Soft, NT, ND  Lymph:  (-)Edema, (-)obvious lymphadenopathy  Skin: Warm to touch, Normal turgor  Psych:  unable to adequately assess mood and affect    DATA  < from: Transthoracic Echocardiogram (07.07.21 @ 18:17) >  ------------------------------------------------------------------------  CONCLUSIONS:  1. Mitral annular calcification, otherwise normal mitral  valve. Minimal mitral regurgitation.  2. Endocardium not well visualized; grossly normal left  ventricular systolic function.  3. Right ventricular enlargement with decreased right  ventricular systolic function.  4. Inadequate tricuspid regurgitation Doppler envelope  precludes estimation of RVSP.  ------------------------------------------------------------------------  Confirmed on  7/7/2021 - 21:16:20 by Osvaldo Bertrand M.D.,  [facility], [facility]    < end of copied text >      A/P) 71 y/o male PMH CAD s/p CABG and old PCI, AFL x 1 year on coumadin, HTN, hyperlipidemia, DM, ESRD on HD, and hypothyroidism originally admitted to [facility] with GIB, transferred to [facility], found to have septic shock and MRSA bacteremia, trx to MICU s/p PEA arrest, intubated and now extubated    -Given MRSA bacteremia, TRUMAN performed demonstrating MV endocarditis - discussed with CTS Dr. Pierce - pt. not a surgical candidate at this time   -s/p abx, repeat BCx neg  -CTH head noted - pt. with cva  -Right thigh hematoma noted on CT scan - appreciate vascular evaluation - no surgical intervention required by vascular at this time  -Follow up palliative care, pt is DNR  -Plan for comfort care, hospice eval  -No further inpatient cardiac w/u planned    Wilfred Robin PA-C  Pager: [phone]

## 2021-08-02 NOTE — PROGRESS NOTE ADULT - SUBJECTIVE AND OBJECTIVE BOX
SUBJECTIVE AND OBJECTIVE:  pt min awake.  family at bedside.  no further hd  INTERVAL HPI/OVERNIGHT EVENTS:    DNR on chart:   Allergies    lisinopril (Other)  opioid-like analgesics (Other)    Intolerances    MEDICATIONS  (STANDING):  chlorhexidine 2% Cloths 1 Application(s) Topical daily  pantoprazole  Injectable 40 milliGRAM(s) IV Push two times a day    MEDICATIONS  (PRN):  acetaminophen   Tablet .. 1000 milliGRAM(s) Oral every 6 hours PRN Temp greater or equal to 38C (100.4F), Mild Pain (1 - 3), Moderate Pain (4 - 6)  calamine/zinc oxide Lotion 1 Application(s) Topical three times a day PRN Itching  HYDROmorphone  Injectable 0.5 milliGRAM(s) IV Push every 2 hours PRN pain or dyspnea  LORazepam   Injectable 0.5 milliGRAM(s) IV Push every 2 hours PRN Agitation  metoprolol tartrate Injectable 5 milliGRAM(s) IV Push every 6 hours PRN HR>100 and/or SBP>140      ITEMS UNCHECKED ARE NOT PRESENT    PRESENT SYMPTOMS: [x ]Unable to obtain due to poor mentation   Source if other than patient:  [ ]Family   [ ]Team     Pain (Impact on QOL):    Location:  Minimal acceptable level (0-10 scale):            Aggravating factors:  Quality:  Radiation:  Severity (0-10 scale):    Timing:    Dyspnea:                           [ ]Mild [ ]Moderate [ ]Severe  Anxiety:                             [ ]Mild [ ]Moderate [ ]Severe  Fatigue:                             [ ]Mild [ ]Moderate [ ]Severe  Nausea:                             [ ]Mild [ ]Moderate [ ]Severe  Loss of appetite:              [ ]Mild [ ]Moderate [ ]Severe  Constipation:                    [ ]Mild [ ]Moderate [ ]Severe    PAIN AD Score:	  http://geriatrictoolkit.[email or web address]/painad.pdf (Ctrl + left click to view)    Other Symptoms:  [ ]All other review of systems negative     Karnofsky Performance Score/Palliative Performance Status Version 2:    20     %    http://palliative.info/resource_material/PPSv2.pdf  PHYSICAL EXAM:  Vital Signs Last 24 Hrs  T(C): 36.9 (02 Aug 2021 05:33), Max: 37.1 (01 Aug 2021 21:18)  T(F): 98.5 (02 Aug 2021 05:33), Max: 98.8 (01 Aug 2021 21:18)  HR: 96 (02 Aug 2021 05:33) (96 - 98)  BP: 134/61 (02 Aug 2021 05:33) (128/59 - 134/61)  BP(mean): --  RR: 18 (02 Aug 2021 05:33) (18 - 18)  SpO2: 93% (02 Aug 2021 05:33) (93% - 94%) I&O's Summary   GENERAL:  [ ]Alert  [ ]Oriented x   [x ]Lethargic  [ ]Cachexia  [ ]Unarousable  [ ]Verbal  [ ]Non-Verbal    Behavioral:   [ ] Anxiety  [ x] Delirium [ ] Agitation [ ] Other    HEENT:  [ ]Normal   [ x]Dry mouth   [ ]ET Tube/Trach  [ ]Oral lesions    PULMONARY:   [x ]Clear [ ]Tachypnea  [ ]Audible excessive secretions   [ ]Rhonchi        [ ]Right [ ]Left [ ]Bilateral  [ ]Crackles        [ ]Right [ ]Left [ ]Bilateral  [ ]Wheezing     [ ]Right [ ]Left [ ]Bilateral    CARDIOVASCULAR:    [ ]Regular [ ]Irregular [ x]Tachy  [ ]Gm [ ]Murmur [ ]Other    GASTROINTESTINAL:  [x ]Soft  [ ]Distended   [ x]+BS  [ x]Non tender [ ]Tender  [ ]PEG [ ]OGT/ NGT   Last BM:  GENITOURINARY:  [ ]Normal [ ] Incontinent   [ ]Oliguria/Anuria   [ ]Madrigal    MUSCULOSKELETAL:   [ ]Normal   [ ]Weakness  [x ]Bed/Wheelchair bound [ ]Edema    NEUROLOGIC:   [ ]No focal deficits  [x ] Cognitive impairment  [ ] Dysphagia [ ]Dysarthria [ ] Paresis [ ]Other     SKIN:   [ ]Normal   [ x]Pressure ulcer(s)  [ ]Rash    CRITICAL CARE:  [ ] Shock Present  [ ]Septic [ ]Cardiogenic [ ]Neurologic [ ]Hypovolemic  [ ]  Vasopressors [ ]  Inotropes   [ ] Respiratory failure present  [ ] Acute  [ ] Chronic [ ] Hypoxic  [ ] Hypercarbic [ ] Other  [ ] Other organ failure     LABS:            RADIOLOGY & ADDITIONAL STUDIES:    Protein Calorie Malnutrition Present: [ ] yes [ ] no  [ ] PPSV2 < or = 30%  [ ] significant weight loss [ ] poor nutritional intake [ ] anasarca [ ] catabolic state Artificial Nutrition [ ]     REFERRALS:   [ ]Chaplaincy  [ ] Hospice  [ ]Child Life  [ ]Social Work  [ ]Case management [ ]Holistic Therapy   Goals of Care Document:

## 2021-08-02 NOTE — PROGRESS NOTE ADULT - PROBLEM SELECTOR PLAN 3
TRUMAN significant for vegetation on posterior leaflet of mitral valve  - Antibiotic therapy as above  - No AC at this time in setting of patient being unable to tolerate PO TRUMAN significant for vegetation on posterior leaflet of mitral valve  - Off abx, comfort care   - No AC at this time in setting of patient being unable to tolerate PO

## 2021-08-02 NOTE — PROGRESS NOTE ADULT - ASSESSMENT
69 yo M with PMhx of CAD s/p CABG (course at that time complicated by pleural effusion requiring chest tubes), ESRD on HD (MWF), DMII, HTN, afib on coumadin, and anemia presented from [facility] for evaluation of chest pain and GIB.       left sided pleural effusion: loculated and chronic:   CAD:  CABG:  ESRD:   DM:  HTN:  A fibrillation:   GI Bleed    7/7:    left sided pleural effusion: loculated and chronic: he has chr loculated pleural effusion on left side: He had chest tube placement in last June 2020 at another hospital: no chemistry is available but he had negative cytology at that time: This time the effusion seems slightly worse and has loculated effusion with pleural thickening: heis not SOB andhasbeen on roomair: I think , it at all, if this effusion needs to be dealt with : it is probably vats: will contact thoracic surgery : etiology not very clear as there is no previous chemistry: coult be exudative or transudatve: he is on HD and now it is a chr christiano effusion   CAD: cont per cards  CABG: cont current meds  ESRD: on HD   DM: monitor and control  HTN: controlled  A fibrillation: off ac:   GI Bleed: per gi :  DW pt and t [id number]    7/8:    left sided pleural effusion:: fever: s/p left sided chest tube placement: cultures sent: however with pr criteria it seems transudative await serum LDH but pleural fluid LDH is low:  already started on broad spectrum antibiotics ID to see: await cultures:   CAD: cont per cards  CABG: cont current meds  ESRD: on HD   DM: monitor and control  HTN: controlled  A fibrillation: off ac:   GI Bleed: per gi :  DW pmd    7/9:    left sided pleural effusion:: fever: s/p left sided chest tube placement: cultures sent: however with pr criteria it seems borderline transudative and transudative by LDH criteria: the vulutres ahve been negative so far:  already started on broad spectrum antibiotics ID to see: await cultures: ? source of spesis  CAD: cont per cards  CABG: cont current meds  ESRD: on HD   DM: monitor and control  HTN: in shock: now on vasopressors:   A fibrillation: off ac:   GI Bleed: per gi :  DW pmd  micu team    7/11:      left sided pleural effusion:: fever: s/p left sided chest tube placement: cultures sent: however with pr criteria it seems borderline transudative and transudative by LDH criteria: the cultures have been negative so far:  already started on broad spectrum antibiotics : Has MSSA bactermia  CAD: cont per cards: still on vasopressors: wean as tolerated ? needs molly ro tule out IE : would defer to cards:   CABG: cont current meds  ESRD: on HD   DM: monitor and control  HTN: in shock: now on vasopressors:   A fibrillation: off ac:   GI Bleed: per gi :  DW pmd  micu team    7/12:    left sided pleural effusion:: fever: s/p left sided chest tube placement:- now removed: cultures sent: however with pr criteria it seems borderline transudative and transudative by LDH criteria: the cultures have been negative so far:  already started on broad spectrum antibiotics : Has MRSA bactermia: vanco restarted yesterday ? echo/molly  Ac hypercarbic resp fialure: needs to be watched closelyin MICU : needs bipap may need intubation" At this time he is alert and awake: and responds to simple questions:    CAD: cont per cards: still on vasopressors: wean as tolerated ? needs molly ro tule out IE : would defer to cards:   CABG: cont current meds  ESRD: on HD   DM: monitor and control  HTN: in shock: now on vasopressors:   A fibrillation: off ac:   GI Bleed: per gi :  DW pmd  cards and bedstaff    7/13:  left sided pleural effusion:: fever: s/p left sided chest tube placement:- now removed: cultures sent: however with pr criteria it seems borderline transudative and transudative by LDH criteria: the cultures have been negative so far:  already started on broad spectrum antibiotics : Has MRSA bacteremia vanco restarted yesterday ? echo/molly  Ac hypercarbic resp fialure: now intubated s/p acls protocol last night   CAD: cont per cards: still on vasopressors: wean as tolerated ? needs molly ro tule out IE : would defer to cards:   CABG: cont current meds  ESRD: on HD   DM: monitor and control  HTN: in shock: now on vasopressors:   A fibrillation: off ac:   GI Bleed: per gi :  DELLA  staff  pt is critically ill now:     7/14:  left sided pleural effusion:: fever: s/p left sided chest tube placement:- now removed: cultures sent: however with pr criteria it seems borderline transudative and transudative by LDH criteria: the cultures have been negative so far:  already started on broad spectrum antibiotics : Has MRSA bacteremia vanco restarted yesterday now for  echo/molly  Ac hypercarbic resp fialure: now intubated s/p acls protocol last night -s/p cardiac arrest: ROSC 10 mts:   CAD: cont per cards: still on vasopressors: wean as tolerated ? needs molly ro tule out IE : would defer to cards: Cotn antbitioc  CABG: cont current meds  ESRD: on HD   DM: monitor and control  HTN: in shock: now on vasopressors:   A fibrillation: off ac:   GI Bleed: per gi :  DW  staff  pt is critically ill now: dw cards    7/15:    left sided pleural effusion:: transudative pl effusion with no with: certainly not the source for MRSA in blood:   Ac hypercarbic resp fialure: now intubated s/p acls protocol last night -s/p cardiac arrest: ROSC 10 mts:   CAD: cont per cards: still on vasopressors: wean as tolerated ? needs molly ro tule out IE : would defer to cards: Cotn antbitioc  CABG: cont current meds  ESRD: on HD   DM: monitor and control  HTN: in shock: now on vasopressors:   A fibrillation: on ac:   GI Bleed: per gi :  DW  staff  pt is critically ill now:     7/16"  left sided pleural effusion:: transudative pl effusion with no with: certainly not the source for MRSA in blood: ?source for MRSA:   Ac hypercarbic resp fialure: now intubated s/p acls protocol last night -s/p cardiac arrest: ROSC 10 mts: he seems to respond off sedation:per MICU note:   CAD: cont per cards: still on vasopressors: wean as tolerated ? needs molly ro tule out IE : would defer to cards: Cotn antbitioc  CABG: cont current meds  ESRD: on HD   DM: monitor and control  HTN: in shock: now on vasopressors:   A fibrillation: on ac:   GI Bleed: per gi :  DW  staff: PMD  pt is critically ill now:      7/17:    left sided pleural effusion:: transudative pl effusion with no with: certainly not the source for MRSA in blood: ?source for MRSA: for molly on monday  Ac hypercarbic resp fialure: now intubated s/p acls protocol last night -s/p cardiac arrest: ROSC 10 mts:   CAD: cont per cards: still on vasopressors: wean as tolerated ? needs molly ro tule out IE : would defer to cards: Cont antibiotics:   CABG: cont current meds  ESRD: on HD   DM: monitor and control  HTN: in shock: now on vasopressors:   A fibrillation: on ac:   GI Bleed: per gi :  DW  staff: PMD  pt is critically ill now:    7/18:    left sided pleural effusion:: transudative pl effusion with no with: certainly not the source for MRSA in blood: ?source for MRSA: for molly on monday: he is off sedation   Ac hypercarbic resp fialure: now intubated s/p acls protocol last night -s/p cardiac arrest: ROSC 10 mts:   Acute cva: noted on ct scan head: neurology following: already on heparin   CAD: cont per cards: still on vasopressors: wean as tolerated ? needs molly ro tule out IE : would defer to cards: Cont antibiotics:   CABG: cont current meds  ESRD: on HD   DM: monitor and control  HTN: in shock: now on vasopressors:   A fibrillation: on ac:   GI Bleed: per gi :  pt is critically ill now:    7/19:      left sided pleural effusion:: transudative pl effusion with no with: certainly not the source for MRSA in blood: ?source for MRSA: for molly today: he is off sedation but still on pressors:   Ac hypercarbic resp fialure: now intubated s/p acls protocol last night -s/p cardiac arrest: ROSC 10 mts:   Acute cva: noted on ct scan head: neurology following: already on heparin   CAD: cont per cards: still on vasopressors: wean as tolerated ? needs molly ro tule out IE : would defer to cards: Cont antibiotics: for molly today  CABG: cont current meds  ESRD: on HD   DM: monitor and control  HTN: in shock: now on vasopressors:   A fibrillation: on ac:   GI Bleed: per gi :  pt is critically ill now:    7/20  Acute Hypercarbic Respiratory Failure  -S/p RRT, tx to MICU 7/8, intubated 7/13 s/p cardiac arrest w/ ROSC  -CPAP trials, ventilator management per MICU team  -Pulmonary toileting, suction PRN   L pleural effusion   -Loculated L pleural effusion on CT chest, s/p CT placement by thoracic 7/8  -Transudative effusion, Cx negative   -CT since removed   -Repeat CT chest with small b/l partially loculated effusions, adjacent atelectasis  Bacteremia  -BC 7/9 MRSA+  -ABX per ID  -Repeat BC NGTD   -F/u cardiology recs regarding MOLLY   CAD (s/p CABG) - per cards   ESRD - HD per renal   GI bleed - GI recs noted     7/21  Acute Hypercarbic Respiratory Failure  -S/p RRT, tx to MICU 7/8, intubated 7/13 s/p cardiac arrest w/ ROSC  -CPAP trials, ventilator management per MICU team  -Pulmonary toileting, suction PRN   L pleural effusion   -Loculated L pleural effusion on CT chest, s/p CT placement by thoracic 7/8  -Transudative effusion, Cx negative   -CT since removed   -Repeat CT chest with small b/l partially loculated effusions, adjacent atelectasis  Bacteremia  -BC 7/9 MRSA+  -ABX per ID  -Repeat BC NGTD   -MOLLY now with MV vegetation per cards  -Pending CT lumbar spine   -F/u CT surgery recs  CAD (s/p CABG) - per cards   ESRD - HD per renal   GI bleed - GI recs noted     7/22  Acute Hypercarbic Respiratory Failure  --S/p RRT, tx to MICU 7/8, intubated 7/13 s/p cardiac arrest w/ ROSC-10 mts  --CPAP trials, ventilator management per MICU team  --Pulmonary toileting, suction PRN   L pleural effusion   --Loculated L pleural effusion on CT chest, s/p CT placement by thoracic 7/8  --Transudative effusion, Cx negative   --CT since removed   --Repeat CT chest with small b/l partially loculated effusions, adjacent atelectasis  Bacteremia  --BC 7/9 MRSA+, repeat BC NGTD   --MOLLY now with MV vegetation, not a candidate for surgery per CTS  --CT lumbar spine with no evidence of discitis   --ABX per ID  DVT prophylaxis  --AC held due to drop in H/H  --Concern for R thigh hematoma, vascular recs noted   CAD (s/p CABG) - per cards   ESRD - HD per renal   GI bleed - resolved, GI recs noted   Care per MICU team     7/23  Acute Hypercarbic Respiratory Failure  --S/p RRT, tx to MICU 7/8, intubated 7/13 s/p cardiac arrest w/ ROSC-10 mts  --Tolerating CPAP trials, plan to extubate per RN   --Ventilator management per MICU team  --Pulmonary toileting, suction PRN   L pleural effusion   --Loculated L pleural effusion on CT chest, s/p CT placement by thoracic 7/8  --Transudative effusion, Cx negative   --CT since removed   --Repeat CT chest with small b/l partially loculated effusions, adjacent atelectasis  Bacteremia  --BC 7/9 MRSA+, repeat BC NGTD   --MOLLY with MV vegetation, not a candidate for surgery per CTS  --CT lumbar spine with no evidence of discitis   --ABX per ID  DVT prophylaxis  --AC held due to drop in H/H  --Concern for R thigh hematoma, vascular recs noted   CAD (s/p CABG) - per cards   ESRD - HD per renal   GI bleed - resolved, GI recs noted   Care per MICU team     7/24:    Acute Hypercarbic Respiratory Failure : S/p RRT, tx to MICU 7/8, intubated 7/13 s/p cardiac arrest w/ ROSC-10 mts extubated now: alert and awake:   L pleural effusion : Loculated L pleural effusion on CT chest, s/p CT placement by thoracic 7/8: Transudative effusion, Cx negative : CT since removed : Repeat CT chest with small b/l partially loculated effusions, adjacent atelectasis  Bacteremia: -serratia: now: ij new blood cultures cont antibiotics : MOLLY with MV vegetation, not a candidate for surgery per CTS  --CT lumbar spine with no evidence of discitis   --ABX per ID  DVT prophylaxis : has hematoma in adductionr muscles: AC held due to drop in H/H  CAD (s/p CABG) - per cards   ESRD - HD per renal   GI bleed - resolved, GI recs noted   Care per MICU team : della resident today     7/25:    Acute Hypercarbic Respiratory Failure : S/p RRT, tx to MICU 7/8, intubated 7/13 s/p cardiac arrest w/ ROSC-10 mts extubated now: todays he is lethargic abg being done:   L pleural effusion : Loculated L pleural effusion on CT chest, s/p CT placement by thoracic 7/8: Transudative effusion, Cx negative : CT since removed : Repeat CT chest with small b/l partially loculated effusions, adjacent atelectasis  Bacteremia: -serratia: now: ij new blood cultures cont antibiotics : MOLLY with MV vegetation, not a candidate for surgery per CTS: CT lumbar spine with no evidence of discitis : ABX per ID  DVT prophylaxis : has hematoma in adductor muscles: AC held due to drop in H/H  CAD (s/p CABG) - per cards   ESRD - HD per renal   GI bleed - resolved, GI recs noted   Care per MICU team : dw resident today too!    7/26:    Acute Hypercarbic Respiratory Failure : S/p RRT, tx to MICU 7/8, intubated 7/13 s/p cardiac arrest w/ ROSC-10 mts extubated now: todays he is lethargic abg from yesterday  noted: pretty reasonable   L pleural effusion : Loculated L pleural effusion on CT chest, s/p CT placement by thoracic 7/8: Transudative effusion, Cx negative : CT since removed : Repeat CT chest with small b/l partially loculated effusions, adjacent atelectasis  Bacteremia: -serratia: now: ij new blood cultures cont antibiotics : MOLLY with MV vegetation, not a candidate for surgery per CTS: CT lumbar spine with no evidence of discitis : ABX per ID  DVT prophylaxis : has hematoma in adductor muscles: AC held due to drop in H/H  CAD (s/p CABG) - per cards   ESRD - HD per renal   GI bleed - resolved, GI recs noted   Care per MICU team :     7/27  Acute Hypercarbic Respiratory Failure   -S/p RRT, tx to MICU 7/8, intubated 7/13 s/p cardiac arrest w/ ROSC (10 min)  -Extubated now, ABG noted  -O2 sats % on 2LNC  -Chest PT   L pleural effusion   -Loculated L pleural effusion on CT chest, s/p CT placement by thoracic 7/8 (Transudative effusion, Cx negative)  -CT since removed   -Repeat CT chest with small b/l partially loculated effusions, adjacent atelectasis  Bacteremia  -BC 7/9 MRSA+, now BC 7/24 +Serratia  -MOLLY with MV vegetation  -ABX per ID   -ID recs noted  DVT prophylaxis  -remains off AC per MICU team   CAD (s/p CABG) - per cards   ESRD - HD per renal   GI bleed - resolved, GI recs noted   Palliative care recs noted, overall prognosis guarded     7/29  Acute Hypercarbic Respiratory Failure   -S/p RRT, tx to MICU 7/8, intubated 7/13 s/p cardiac arrest w/ ROSC (10 min)  -Extubated now, now transferred from MICU to floor  -Seen on 4LNC with O2 sat 100%, can wean O2 as tolerated  -Check ABG  -Aspiration precautions  -Pending CT A/P with IV contrast, if creatinine allows can also check CT chest   L pleural effusion   -Loculated L pleural effusion on CT chest, s/p CT placement by thoracic 7/8 (Transudative effusion, Cx negative)  -CT since removed   -Repeat CT chest with small b/l partially loculated effusions, adjacent atelectasis  -F/u CT chest with contrast if able   Bacteremia  -BC 7/9 MRSA+, now BC 7/24 +Serratia  -MOLLY with MV vegetation  -ABX per ID   -ID recs noted  -F/u CT  DVT prophylaxis  -remains off AC at this time  -H/H stable, cardiology recs noted  Dysphagia   -S/S consult  -If unable to tolerate PO, f/u GOC conversation with family regarding enteral feeds  CAD (s/p CABG) - per cards   ESRD - HD per renal   Palliative care recs noted, overall prognosis guarded   D/w ACP    7/30:    Acute Hypercarbic Respiratory Failure   -S/p RRT, tx to MICU 7/8, intubated 7/13 s/p cardiac arrest w/ ROSC (10 min)  -Extubated now, now transferred from MICU to floor  -Seen on 4LNC with O2 sat 100%, can wean O2 as tolerated  -Check ABG  -Aspiration precautions  repeat ct chest noed; suspicious for pneumonia on left side: he is aleready on cefepime as well as vanco: della team : to discuss with ID to see if any antibtiocs needed to be changed    L pleural effusion   -Loculated L pleural effusion on CT chest, s/p CT placement by thoracic 7/8 (Transudative effusion, Cx negative)  -CT since removed   -Repeat CT chest with small b/l partially loculated effusions, adjacent atelectasis  Bacteremia  -BC 7/9 MRSA+, now BC 7/24 +Serratia  -MOLLY with MV vegetation  -ABX per ID   -ID recs noted  DVT prophylaxis  -remains off AC at this time  -H/H stable, cardiology recs noted  Dysphagia   -S/S consult  -If unable to tolerate PO, f/u GOC conversation with family regarding enteral feeds  CAD (s/p CABG) - per cards   ESRD - HD per renal   Palliative care recs noted, overall prognosis guarded   D/w ACP    8/1:    Acute Hypercarbic Respiratory Failure : for comfort care: on pleasure feeds:   -S/p RRT, tx to MICU 7/8, intubated 7/13 s/p cardiac arrest w/ ROSC (10 min)  -Seen on 4LNC with O2 sat 100%, can wean O2 as tolerated  -Aspiration precautions  off antibiotics now plan for comfort care:     L pleural effusion   -Loculated L pleural effusion on CT chest, s/p CT placement by thoracic 7/8 (Transudative effusion, Cx negative)  -CT since removed   Bacteremia  -BC 7/9 MRSA+, now BC 7/24 +Serratia  -MOLLY with MV vegetation   on no antibiotics now: stopped today   DVT prophylaxis  -remains off AC at this time  -H/H stable, cardiology recs noted  Dysphagia   -S/S consult  -If unable to tolerate PO, f/u GOC conversation with family regarding enteral feeds  CAD (s/p CABG) - per cards   ESRD - HD per renal   Palliative care recs noted, overall prognosis guarded   D/w resident: for comfort care: in pt hospice:     8/2  Acute Hypercarbic Respiratory Failure  -S/p RRT, tx to MICU 7/8, intubated 7/13 s/p cardiac arrest w/ ROSC (10 min)  -Now extubated, tolerating 4LNC with O2 sats 93%   -Aspiration precautions  -Goal for comfort care, awaiting inpatient hospice   L pleural effusion   -Loculated L pleural effusion on CT chest, s/p CT placement by thoracic 7/8 (Transudative effusion, Cx negative)  -CT since removed   -Repeat CT chest  with moderate L pleural effusion with associated bibasilar atelectasis  -Appears comfortable, in no distress  -Awaiting d/c to inpatient hospice   Bacteremia  -BC 7/9 MRSA+, now BC 7/24 +Serratia  -MOLLY with MV vegetation  -CT chest with ?superimposed pna  -Off ABX now, comfort care   DVT prophylaxis  -remains off AC at this time  Dysphagia   -comfort feeds  -aspiration precautions  CAD (s/p CABG) - per cards   ESRD - comfort care, no further HD   Palliative care recs noted, overall prognosis guarded   Awaiting d/c to inpatient hospice, no c/i from pulmonary standpoint

## 2021-08-02 NOTE — PROGRESS NOTE ADULT - PROBLEM SELECTOR PLAN 2
s/p extubation  continue o2 support  no further intubation as per family  continue dilaudid 0.5mg iv q 2 hours prn dyspnea

## 2021-08-02 NOTE — PROGRESS NOTE ADULT - SUBJECTIVE AND OBJECTIVE BOX
%%%%INCOMPLETE NOTE%%%%%     Dr. Cheri Ryan, PGY-1  [facility]: [id number]/ NS:  After 7pm please page [phone] or [phone]    VIOLETTA RENTERIA  70y  MRN: [id number]    Subjective:    Patient is a 70y old Male who presents with a chief complaint of Chest pain and GIB (01 Aug 2021 15:36)      MEDICATIONS  (STANDING):  chlorhexidine 2% Cloths 1 Application(s) Topical daily  pantoprazole  Injectable 40 milliGRAM(s) IV Push two times a day    MEDICATIONS  (PRN):  acetaminophen   Tablet .. 1000 milliGRAM(s) Oral every 6 hours PRN Temp greater or equal to 38C (100.4F), Mild Pain (1 - 3), Moderate Pain (4 - 6)  calamine/zinc oxide Lotion 1 Application(s) Topical three times a day PRN Itching  HYDROmorphone  Injectable 0.5 milliGRAM(s) IV Push every 2 hours PRN pain or dyspnea  metoprolol tartrate Injectable 5 milliGRAM(s) IV Push every 6 hours PRN HR>100 and/or SBP>140        Objective:    Vitals: Vital Signs Last 24 Hrs  T(C): 36.9 (08-02-21 @ 05:33), Max: 37.1 (08-01-21 @ 21:18)  T(F): 98.5 (08-02-21 @ 05:33), Max: 98.8 (08-01-21 @ 21:18)  HR: 96 (08-02-21 @ 05:33) (88 - 98)  BP: 134/61 (08-02-21 @ 05:33) (125/59 - 134/61)  BP(mean): --  RR: 18 (08-02-21 @ 05:33) (18 - 18)  SpO2: 93% (08-02-21 @ 05:33) (93% - 100%)            I&O's Summary      PHYSICAL EXAM:  GENERAL: NAD, well-groomed, well-developed  HEAD:  Atraumatic, Normocephalic  EYES: EOMI, PERRLA, conjunctiva and sclera clear  ENMT: No tonsillar erythema, exudates, or enlargement; Moist mucous membranes, Good dentition, No lesions  NECK: Supple, No JVD, Normal thyroid  CHEST/LUNG: Clear to auscultation bilaterally; No rales, rhonchi, wheezing, or rubs  HEART: Regular rate and rhythm; No murmurs, rubs, or gallops  ABDOMEN: Soft, Nontender, Nondistended; Bowel sounds present  EXTREMITIES:  2+ Peripheral Pulses, No clubbing, cyanosis, or edema  LYMPH: No lymphadenopathy noted  SKIN: No rashes or lesions  NERVOUS SYSTEM:  Alert & Oriented X4, Good concentration  PSYCH: Normal Affect. Speaking in Full Sentences. Laying in bed comfortably; not agitated     LABS:                        9.3    12.27 )-----------( 288      ( 30 Jul 2021 07:51 )             33.8     Hgb Trend: 9.3<--, 9.2<--, 8.4<--, 8.6<--  07-30    143  |  98  |  24<H>  ----------------------------<  112<H>  4.2   |  26  |  3.71<H>    Ca    9.7      30 Jul 2021 07:51    TPro  6.9  /  Alb  2.5<L>  /  TBili  2.5<H>  /  DBili  x   /  AST  53<H>  /  ALT  35  /  AlkPhos  527<H>  07-30    Creatinine Trend: 3.71<--, 5.39<--, 4.14<--, 6.08<--, 5.06<--, 4.13<--                    CAPILLARY BLOOD GLUCOSE         Dr. Cheri Ryan, PGY-1  [facility]: [id number]/ NS:  After 7pm please page [phone] or [phone]    VIOLETTA RENTERIA  70y  MRN: [id number]    Patient is a 70y old Male who presents with a chief complaint of Chest pain and GIB (01 Aug 2021 15:36)    Subjective: Patient seen and examined at bedside. Per RN, patient is more lethargic today with a non productive cough. Patient follows commands and gives a thumbs up or down to respond to questions. Denies chest pain, abdominal pain, no n/v/c/d. Patient breathing comfortably on 4L NC.     MEDICATIONS  (STANDING):  chlorhexidine 2% Cloths 1 Application(s) Topical daily  pantoprazole  Injectable 40 milliGRAM(s) IV Push two times a day    MEDICATIONS  (PRN):  acetaminophen   Tablet .. 1000 milliGRAM(s) Oral every 6 hours PRN Temp greater or equal to 38C (100.4F), Mild Pain (1 - 3), Moderate Pain (4 - 6)  calamine/zinc oxide Lotion 1 Application(s) Topical three times a day PRN Itching  HYDROmorphone  Injectable 0.5 milliGRAM(s) IV Push every 2 hours PRN pain or dyspnea  metoprolol tartrate Injectable 5 milliGRAM(s) IV Push every 6 hours PRN HR>100 and/or SBP>140        Objective:    Vitals: Vital Signs Last 24 Hrs  T(C): 36.9 (08-02-21 @ 05:33), Max: 37.1 (08-01-21 @ 21:18)  T(F): 98.5 (08-02-21 @ 05:33), Max: 98.8 (08-01-21 @ 21:18)  HR: 96 (08-02-21 @ 05:33) (88 - 98)  BP: 134/61 (08-02-21 @ 05:33) (125/59 - 134/61)  BP(mean): --  RR: 18 (08-02-21 @ 05:33) (18 - 18)  SpO2: 93% (08-02-21 @ 05:33) (93% - 100%)            I&O's Summary      PHYSICAL EXAM:  GENERAL: NAD, lying in bed comfortably  HEAD:  Atraumatic, Normocephalic  EYES: EOMI, PERRLA, conjunctiva and sclera clear  ENMT: No tonsillar erythema, exudates, or enlargement; moist oral mucosa   NECK: Supple, No JVD, Normal thyroid  CHEST/LUNG: Clear to auscultation bilaterally; No rales, rhonchi, wheezing, or rubs  HEART: Regular rate and rhythm; No murmurs, rubs, or gallops  ABDOMEN: Soft, Nontender, Nondistended; Bowel sounds present  EXTREMITIES:  1+ Peripheral Pulses, LE pitting edema   LYMPH: No lymphadenopathy noted  SKIN: No rashes or lesions  NERVOUS SYSTEM: Patient responds to name and gives hand signals to answer questions.     LABS:                        9.3    12.27 )-----------( 288      ( 30 Jul 2021 07:51 )             33.8     Hgb Trend: 9.3<--, 9.2<--, 8.4<--, 8.6<--  07-30    143  |  98  |  24<H>  ----------------------------<  112<H>  4.2   |  26  |  3.71<H>    Ca    9.7      30 Jul 2021 07:51    TPro  6.9  /  Alb  2.5<L>  /  TBili  2.5<H>  /  DBili  x   /  AST  53<H>  /  ALT  35  /  AlkPhos  527<H>  07-30    Creatinine Trend: 3.71<--, 5.39<--, 4.14<--, 6.08<--, 5.06<--, 4.13<--                    CAPILLARY BLOOD GLUCOSE

## 2021-08-02 NOTE — PROGRESS NOTE ADULT - ATTENDING COMMENTS
71 y/o male PMH CAD s/p CABG and old PCI, AFL x 1 year on coumadin, HTN, hyperlipidemia, DM, ESRD on HD, and hypothyroidism originally admitted to [facility] with GIB, transferred to [facility], found to have septic shock and MRSA bacteremia, trx to MICU s/p PEA arrest, intubated and now extubated transitioned to comfort care.     #MRSA bacteremia: w/ associated MV endocarditis and serratia bacteremia, not a surgical candidate, s/p IV Vanco and Cefepime, antibiotics discontinued as it will not change outcome, plan for comfort care   #Acute encephalopathy d/t acute CVA and possible anoxic brain injury post cardiac arrest, MRI brain unlikely to , HCP wants comfort care   #Acute hypoxic respiratory failure d/t aspiration pneumonia, s/p intubation and now extubated, c/w supplemental O2, s/p IV Vanco and Cefepime for possible MRSA or gram negative PNA, started on Dilaudid IV prn   #Paroxysmal Afib, holding Coumadin d/t hematoma and ?GI bleed, c/w IV Lopressor   #ESRD: intermittent hypotension during hemodialysis, dialysis stopped as per HCP's wishes   #Dysphagia: failed dysphagia screen and high risk of aspiration, plan is for pleasure feeds and comfort care   #GOC: DNR/DNI, comfort care. Plan for inpatient hospice referral. No lab draws. Symptom directed care

## 2021-08-02 NOTE — PROGRESS NOTE ADULT - ASSESSMENT
71 yo M with PMHx of CAD s/p CABG (course at that time complicated by pleural effusion requiring chest tubes), ESRD on HD (TTS), DMII, HTN, afib on coumadin, and anemia transferred from [facility] for evaluation of chest pain after HD and GIB s/p 2U PRBC transfusion and aflutter/afib with RVR managed with metoprolol. Admitted to MICU for hypotension dependent on pressors likely 2/2 to sepsis. In MICU, patient had a cardiac arrest on 7/13 requiring CPR, 3x epi, and 2x shock (for VTach). Course complicated by recent drop in Hgb requiring pRBCs. CT of leg shows hematoma on right adductor. Patient lethargic, suggesting compromised mental status. AMS likely 2/2 R frontal CVA and anoxic brain injury in the setting of cardiac arrest. Patient transferred from MICU to floor. Blood cultures now growing Serratia. After discussion with son (HCP), decision is now to focus on comfort for Mr. Hardin.  71 yo M with PMHx of CAD s/p CABG (complicated by pleural effusion requiring chest tubes), ESRD on HD (TTS), DMII, HTN, afib on coumadin, and anemia transferred from [facility] for evaluation of chest pain after HD and GIB s/p 2U PRBC transfusion and aflutter/afib with RVR managed with metoprolol. Admitted to MICU for hypotension dependent on pressors likely 2/2 to sepsis. In MICU, patient had a cardiac arrest on 7/13 requiring CPR, 3x epi, and 2x shock (for VTach). Course complicated by recent drop in Hgb requiring pRBCs. CT of leg shows hematoma on right adductor. Patient lethargic, suggesting compromised mental status. AMS likely 2/2 R frontal CVA and anoxic brain injury in the setting of cardiac arrest. Patient transferred from MICU to floor. Blood cultures now growing Serratia. After discussion with son (HCP), decision is now to focus on comfort for Mr. Hardin.  71 yo M with PMHx of CAD s/p CABG (complicated by pleural effusion requiring chest tubes), ESRD on HD (TTS), DMII, HTN, afib on coumadin, and anemia transferred from [facility] for evaluation of chest pain after HD and GIB s/p 2U PRBC transfusion and aflutter/afib with RVR managed with metoprolol. Admitted to MICU for hypotension dependent on pressors likely 2/2 to sepsis. In MICU, patient had a cardiac arrest on 7/13 requiring CPR, 3x epi, and 2x shock (for VTach). Course complicated by recent drop in Hgb requiring pRBCs. CT of leg shows hematoma on right adductor. Patient lethargic, suggesting compromised mental status. AMS likely 2/2 R frontal CVA and anoxic brain injury in the setting of cardiac arrest. Patient transferred from MICU to floor. Blood cultures now growing Serratia. After discussion with son (HCP), decision is now to focus on comfort care.

## 2021-08-03 PROBLEM — N18.6 END STAGE RENAL DISEASE: Chronic | Status: ACTIVE | Noted: 2019-09-16

## 2021-08-03 LAB
CULTURE RESULTS: SIGNIFICANT CHANGE UP
SPECIMEN SOURCE: SIGNIFICANT CHANGE UP

## 2021-08-03 PROCEDURE — 99232 SBSQ HOSP IP/OBS MODERATE 35: CPT | Mod: GC

## 2021-08-03 RX ADMIN — PANTOPRAZOLE SODIUM 40 MILLIGRAM(S): 20 TABLET, DELAYED RELEASE ORAL at 05:58

## 2021-08-03 RX ADMIN — HYDROMORPHONE HYDROCHLORIDE 0.5 MILLIGRAM(S): 2 INJECTION INTRAMUSCULAR; INTRAVENOUS; SUBCUTANEOUS at 13:45

## 2021-08-03 RX ADMIN — HYDROMORPHONE HYDROCHLORIDE 0.5 MILLIGRAM(S): 2 INJECTION INTRAMUSCULAR; INTRAVENOUS; SUBCUTANEOUS at 13:28

## 2021-08-03 RX ADMIN — CHLORHEXIDINE GLUCONATE 1 APPLICATION(S): 213 SOLUTION TOPICAL at 13:27

## 2021-08-03 RX ADMIN — PANTOPRAZOLE SODIUM 40 MILLIGRAM(S): 20 TABLET, DELAYED RELEASE ORAL at 17:54

## 2021-08-03 RX ADMIN — HYDROMORPHONE HYDROCHLORIDE 0.5 MILLIGRAM(S): 2 INJECTION INTRAMUSCULAR; INTRAVENOUS; SUBCUTANEOUS at 23:15

## 2021-08-03 RX ADMIN — HYDROMORPHONE HYDROCHLORIDE 0.5 MILLIGRAM(S): 2 INJECTION INTRAMUSCULAR; INTRAVENOUS; SUBCUTANEOUS at 22:50

## 2021-08-03 NOTE — PROGRESS NOTE ADULT - ASSESSMENT
71 yo M with PMHx of CAD s/p CABG (complicated by pleural effusion requiring chest tubes), ESRD on HD (TTS), DMII, HTN, afib on coumadin, and anemia transferred from [facility] for evaluation of chest pain after HD and GIB s/p 2U PRBC transfusion and aflutter/afib with RVR managed with metoprolol. Admitted to MICU for hypotension dependent on pressors likely 2/2 to sepsis. In MICU, patient had a cardiac arrest on 7/13 requiring CPR, 3x epi, and 2x shock (for VTach). Course complicated by recent drop in Hgb requiring pRBCs. CT of leg shows hematoma on right adductor. Patient lethargic, suggesting compromised mental status. AMS likely 2/2 R frontal CVA and anoxic brain injury in the setting of cardiac arrest. Patient transferred from MICU to floor. Blood cultures now growing Serratia. After discussion with son (HCP), decision is now to focus on comfort care.

## 2021-08-03 NOTE — CHART NOTE - NSCHARTNOTEFT_GEN_A_CORE
patient now comfort care. no HD, antibiotics or blood draws.  Will d/c f/u. please call with questions.

## 2021-08-03 NOTE — PROGRESS NOTE ADULT - SUBJECTIVE AND OBJECTIVE BOX
%%%%INCOMPLETE NOTE%%%%%     Dr. Cheri Ryan, PGY-1  [facility]: [id number]/ NS:  After 7pm please page [phone] or [phone]    Patient is a 70y old  Male who presents with a chief complaint of Chest pain and GIB (02 Aug 2021 18:07)    Subjective:    MEDICATIONS  (STANDING):  chlorhexidine 2% Cloths 1 Application(s) Topical daily  pantoprazole  Injectable 40 milliGRAM(s) IV Push two times a day    MEDICATIONS  (PRN):  acetaminophen   Tablet .. 1000 milliGRAM(s) Oral every 6 hours PRN Temp greater or equal to 38C (100.4F), Mild Pain (1 - 3), Moderate Pain (4 - 6)  calamine/zinc oxide Lotion 1 Application(s) Topical three times a day PRN Itching  HYDROmorphone  Injectable 0.5 milliGRAM(s) IV Push every 2 hours PRN pain or dyspnea  LORazepam   Injectable 0.5 milliGRAM(s) IV Push every 2 hours PRN Agitation  metoprolol tartrate Injectable 5 milliGRAM(s) IV Push every 6 hours PRN HR>100 and/or SBP>140        Objective:    Vitals: Vital Signs Last 24 Hrs  T(C): 36.6 (08-03-21 @ 05:00), Max: 36.8 (08-02-21 @ 21:20)  T(F): 97.8 (08-03-21 @ 05:00), Max: 98.3 (08-02-21 @ 21:20)  HR: 82 (08-03-21 @ 05:00) (82 - 88)  BP: 134/60 (08-03-21 @ 05:00) (134/60 - 136/65)  BP(mean): --  RR: 18 (08-03-21 @ 05:00) (16 - 18)  SpO2: 98% (08-03-21 @ 05:00) (98% - 100%)            I&O's Summary      PHYSICAL EXAM:  GENERAL: NAD, lying in bed comfortably  HEAD:  Atraumatic, Normocephalic  EYES: EOMI, PERRLA, conjunctiva and sclera clear  ENMT: No tonsillar erythema, exudates, or enlargement; moist oral mucosa   NECK: Supple, No JVD, Normal thyroid  CHEST/LUNG: Clear to auscultation bilaterally; No rales, rhonchi, wheezing, or rubs  HEART: Regular rate and rhythm; No murmurs, rubs, or gallops  ABDOMEN: Soft, Nontender, Nondistended; Bowel sounds present  EXTREMITIES:  1+ Peripheral Pulses, LE pitting edema   LYMPH: No lymphadenopathy noted  SKIN: No rashes or lesions  NERVOUS SYSTEM: Patient responds to name and gives hand signals to answer questions.     LABS:    Hgb Trend: 9.3<--, 9.2<--, 8.4<--        Creatinine Trend: 3.71<--, 5.39<--, 4.14<--, 6.08<--, 5.06<--, 4.13<--                    CAPILLARY BLOOD GLUCOSE         Dr. Cheri Ryan, PGY-1  [facility]: [id number]/ NS:  After 7pm please page [phone] or [phone]    Patient is a 70y old  Male who presents with a chief complaint of Chest pain and GIB (02 Aug 2021 18:07)    Subjective: No events overnight. Patient seen and examined at bedside. Patient did not eat yesterday, had BMx1 yesterday. Today patient is increasingly lethargic, does not open eyes when name is called. Breathing comfortably on 4L NC.    MEDICATIONS  (STANDING):  chlorhexidine 2% Cloths 1 Application(s) Topical daily  pantoprazole  Injectable 40 milliGRAM(s) IV Push two times a day    MEDICATIONS  (PRN):  acetaminophen   Tablet .. 1000 milliGRAM(s) Oral every 6 hours PRN Temp greater or equal to 38C (100.4F), Mild Pain (1 - 3), Moderate Pain (4 - 6)  calamine/zinc oxide Lotion 1 Application(s) Topical three times a day PRN Itching  HYDROmorphone  Injectable 0.5 milliGRAM(s) IV Push every 2 hours PRN pain or dyspnea  LORazepam   Injectable 0.5 milliGRAM(s) IV Push every 2 hours PRN Agitation  metoprolol tartrate Injectable 5 milliGRAM(s) IV Push every 6 hours PRN HR>100 and/or SBP>140        Objective:    Vitals: Vital Signs Last 24 Hrs  T(C): 36.6 (08-03-21 @ 05:00), Max: 36.8 (08-02-21 @ 21:20)  T(F): 97.8 (08-03-21 @ 05:00), Max: 98.3 (08-02-21 @ 21:20)  HR: 82 (08-03-21 @ 05:00) (82 - 88)  BP: 134/60 (08-03-21 @ 05:00) (134/60 - 136/65)  BP(mean): --  RR: 18 (08-03-21 @ 05:00) (16 - 18)  SpO2: 98% (08-03-21 @ 05:00) (98% - 100%)            I&O's Summary      PHYSICAL EXAM:  GENERAL: NAD, lying in bed comfortably.  HEAD:  Atraumatic, Normocephalic  EYES: EOMI, PERRLA, conjunctiva and sclera clear  ENMT: No tonsillar erythema, exudates, or enlargement; moist oral mucosa   NECK: Supple, No JVD, Normal thyroid  CHEST/LUNG: Clear to auscultation bilaterally; No rales, rhonchi, wheezing, or rubs.   HEART: Regular rate and rhythm; No murmurs, rubs, or gallops  ABDOMEN: Soft, Nontender, Nondistended; Bowel sounds present  EXTREMITIES:  1+ Peripheral Pulses, 1+ LE pitting edema   LYMPH: No lymphadenopathy noted  SKIN: No rashes or lesions  NERVOUS SYSTEM: AOx0. Patient moves his head when he hears his name, but does not open eyes.     LABS:    Hgb Trend: 9.3<--, 9.2<--, 8.4<--        Creatinine Trend: 3.71<--, 5.39<--, 4.14<--, 6.08<--, 5.06<--, 4.13<--                    CAPILLARY BLOOD GLUCOSE

## 2021-08-03 NOTE — PROGRESS NOTE ADULT - SUBJECTIVE AND OBJECTIVE BOX
DATE OF SERVICE: 08-03-21    S: Family at bedside. Lethargic, no distress, unable to obtain ROS.    Review of Systems:   Constitutional: [ ] fevers, [ ] chills.   Skin: [ ] dry skin. [ ] rashes.  Psychiatric: [ ] depression, [ ] anxiety.   Gastrointestinal: [ ] BRBPR, [ ] melena.   Neurological: [ ] confusion. [ ] seizures. [ ] shuffling gait.   Ears,Nose,Mouth and Throat: [ ] ear pain [ ] sore throat.   Eyes: [ ] diplopia.   Respiratory: [ ] hemoptysis. [ ] shortness of breath  Cardiovascular: See HPI above  Hematologic/Lymphatic: [ ] anemia. [ ] painful nodes. [ ] prolonged bleeding.   Genitourinary: [ ] hematuria. [ ] flank pain.   Endocrine: [ ] significant change in weight. [ ] intolerance to heat and cold.     Review of systems [ ] otherwise negative, [x ] otherwise unable to obtain    FH: no family history of sudden cardiac death in first degree relatives    SH: [ ] tobacco, [ ] alcohol, [ ] drugs      MEDICATIONS  (STANDING):  chlorhexidine 2% Cloths 1 Application(s) Topical daily  pantoprazole  Injectable 40 milliGRAM(s) IV Push two times a day    MEDICATIONS  (PRN):  acetaminophen   Tablet .. 1000 milliGRAM(s) Oral every 6 hours PRN Temp greater or equal to 38C (100.4F), Mild Pain (1 - 3), Moderate Pain (4 - 6)  calamine/zinc oxide Lotion 1 Application(s) Topical three times a day PRN Itching  HYDROmorphone  Injectable 0.5 milliGRAM(s) IV Push every 2 hours PRN pain or dyspnea  LORazepam   Injectable 0.5 milliGRAM(s) IV Push every 2 hours PRN Agitation  metoprolol tartrate Injectable 5 milliGRAM(s) IV Push every 6 hours PRN HR>100 and/or SBP>140      LABS:    Hemoglobin: 9.3 g/dL (07-30 @ 07:51)    Creatinine Trend: 3.71<--, 5.39<--, 4.14<--, 6.08<--, 5.06<--, 4.13<--       PHYSICAL EXAM  Vital Signs Last 24 Hrs  T(C): 36.6 (03 Aug 2021 05:00), Max: 36.8 (02 Aug 2021 21:20)  T(F): 97.8 (03 Aug 2021 05:00), Max: 98.3 (02 Aug 2021 21:20)  HR: 82 (03 Aug 2021 05:00) (82 - 88)  BP: 134/60 (03 Aug 2021 05:00) (134/60 - 136/65)  BP(mean): --  RR: 18 (03 Aug 2021 05:00) (16 - 18)  SpO2: 98% (03 Aug 2021 05:00) (98% - 100%)      Gen: NAD  HEENT:  (-)icterus (-)pallor  CV: N S1 S2 1/6 DEJAH (+)2 Pulses B/l  Resp:  Clear to auscultation B/L, normal effort  GI: (+) BS Soft, NT, ND  Lymph:  (-)Edema, (-)obvious lymphadenopathy  Skin: Warm to touch, Normal turgor  Psych:  unable to adequately assess mood and affect    DATA  < from: Transthoracic Echocardiogram (07.07.21 @ 18:17) >  ------------------------------------------------------------------------  CONCLUSIONS:  1. Mitral annular calcification, otherwise normal mitral  valve. Minimal mitral regurgitation.  2. Endocardium not well visualized; grossly normal left  ventricular systolic function.  3. Right ventricular enlargement with decreased right  ventricular systolic function.  4. Inadequate tricuspid regurgitation Doppler envelope  precludes estimation of RVSP.  ------------------------------------------------------------------------  Confirmed on  7/7/2021 - 21:16:20 by Osvaldo Bertrand M.D.,  [facility], JOHN    < end of copied text >      A/P) 69 y/o male PMH CAD s/p CABG and old PCI, AFL x 1 year on coumadin, HTN, hyperlipidemia, DM, ESRD on HD, and hypothyroidism originally admitted to [facility] with GIB, transferred to [facility], found to have septic shock and MRSA bacteremia, trx to MICU s/p PEA arrest, intubated and now extubated    -Given MRSA bacteremia, TRUMAN performed demonstrating MV endocarditis - discussed with CTS Dr. Pierce - pt. not a surgical candidate at this time   -s/p abx, repeat BCx neg  -CTH head noted - pt. with cva  -Right thigh hematoma noted on CT scan - appreciate vascular evaluation - no surgical intervention required by vascular at this time  -Follow up palliative care, pt is DNR  -Plan for comfort care, hospice eval  -Supportive care per primary team  -No further inpatient cardiac w/u planned    Wilfred Robin PA-C  Pager: [phone]

## 2021-08-03 NOTE — PROGRESS NOTE ADULT - PROBLEM SELECTOR PLAN 2
Patient with blood cultures + for MRSA and Serratia  - s/p Vanc 750mg    - s/p Cefepime 1g qd  - Cultures from 7/29/21; NGTD  - Off abx, comfort care

## 2021-08-03 NOTE — PROGRESS NOTE ADULT - PROBLEM SELECTOR PLAN 1
- DNR/DNI  - Palliative following, appreciate recs   - Decision is for comfort care- no HD, no blood draws. Focus on symptom-directed care   - Continue pleasure feeds   - Pain: IV dilaudid 0.5mg q2h for dyspnea or pain  - Son is interested in inpatient hospice and would not want home hospice as it would take a toll on his mother's health. Family deciding on which hospice center for pt.

## 2021-08-03 NOTE — PROGRESS NOTE ADULT - ATTENDING COMMENTS
71 y/o male PMH CAD s/p CABG and old PCI, AFL x 1 year on coumadin, HTN, hyperlipidemia, DM, ESRD on HD, and hypothyroidism originally admitted to [facility] with GIB, transferred to [facility], found to have septic shock and MRSA bacteremia, trx to MICU s/p PEA arrest, intubated and now extubated transitioned to comfort care.     #MRSA bacteremia: w/ associated MV endocarditis and serratia bacteremia, not a surgical candidate, s/p IV Vanco and Cefepime, antibiotics discontinued as it will not change outcome, plan for comfort care   #Acute encephalopathy d/t acute CVA and possible anoxic brain injury post cardiac arrest, MRI brain unlikely to , HCP wants comfort care   #Acute hypoxic respiratory failure d/t aspiration pneumonia, s/p intubation and now extubated, c/w supplemental O2, s/p IV Vanco and Cefepime for possible MRSA or gram negative PNA, started on Dilaudid IV prn   #Paroxysmal Afib, holding Coumadin d/t hematoma and ?GI bleed, c/w IV Lopressor   #ESRD: intermittent hypotension during hemodialysis, dialysis stopped as per HCP's wishes   #Dysphagia: failed dysphagia screen and high risk of aspiration, plan is for pleasure feeds and comfort care   #GOC: DNR/DNI, comfort care. Plan for inpatient hospice, pending selection of facility by wife. No lab draws. Symptom directed care

## 2021-08-04 LAB
ANION GAP SERPL CALC-SCNC: 21 MMOL/L — HIGH (ref 7–14)
BUN SERPL-MCNC: 82 MG/DL — HIGH (ref 7–23)
CALCIUM SERPL-MCNC: 9.3 MG/DL — SIGNIFICANT CHANGE UP (ref 8.4–10.5)
CHLORIDE SERPL-SCNC: 100 MMOL/L — SIGNIFICANT CHANGE UP (ref 98–107)
CO2 SERPL-SCNC: 22 MMOL/L — SIGNIFICANT CHANGE UP (ref 22–31)
CREAT SERPL-MCNC: 9.29 MG/DL — HIGH (ref 0.5–1.3)
GLUCOSE BLDC GLUCOMTR-MCNC: 108 MG/DL — HIGH (ref 70–99)
GLUCOSE SERPL-MCNC: 50 MG/DL — CRITICAL LOW (ref 70–99)
HCT VFR BLD CALC: 30.6 % — LOW (ref 39–50)
HGB BLD-MCNC: 8.5 G/DL — LOW (ref 13–17)
MAGNESIUM SERPL-MCNC: 2.2 MG/DL — SIGNIFICANT CHANGE UP (ref 1.6–2.6)
MCHC RBC-ENTMCNC: 26.6 PG — LOW (ref 27–34)
MCHC RBC-ENTMCNC: 27.8 GM/DL — LOW (ref 32–36)
MCV RBC AUTO: 95.9 FL — SIGNIFICANT CHANGE UP (ref 80–100)
NRBC # BLD: 0 /100 WBCS — SIGNIFICANT CHANGE UP
NRBC # FLD: 0 K/UL — SIGNIFICANT CHANGE UP
PHOSPHATE SERPL-MCNC: 7.2 MG/DL — HIGH (ref 2.5–4.5)
PLATELET # BLD AUTO: 215 K/UL — SIGNIFICANT CHANGE UP (ref 150–400)
POTASSIUM SERPL-MCNC: 5.6 MMOL/L — HIGH (ref 3.5–5.3)
POTASSIUM SERPL-SCNC: 5.6 MMOL/L — HIGH (ref 3.5–5.3)
RBC # BLD: 3.19 M/UL — LOW (ref 4.2–5.8)
RBC # FLD: 20.9 % — HIGH (ref 10.3–14.5)
SODIUM SERPL-SCNC: 143 MMOL/L — SIGNIFICANT CHANGE UP (ref 135–145)
WBC # BLD: 8.75 K/UL — SIGNIFICANT CHANGE UP (ref 3.8–10.5)
WBC # FLD AUTO: 8.75 K/UL — SIGNIFICANT CHANGE UP (ref 3.8–10.5)

## 2021-08-04 PROCEDURE — 99232 SBSQ HOSP IP/OBS MODERATE 35: CPT

## 2021-08-04 RX ORDER — SODIUM ZIRCONIUM CYCLOSILICATE 10 G/10G
5 POWDER, FOR SUSPENSION ORAL ONCE
Refills: 0 | Status: DISCONTINUED | OUTPATIENT
Start: 2021-08-04 | End: 2021-08-09

## 2021-08-04 RX ADMIN — HYDROMORPHONE HYDROCHLORIDE 0.5 MILLIGRAM(S): 2 INJECTION INTRAMUSCULAR; INTRAVENOUS; SUBCUTANEOUS at 19:10

## 2021-08-04 RX ADMIN — PANTOPRAZOLE SODIUM 40 MILLIGRAM(S): 20 TABLET, DELAYED RELEASE ORAL at 18:48

## 2021-08-04 RX ADMIN — CHLORHEXIDINE GLUCONATE 1 APPLICATION(S): 213 SOLUTION TOPICAL at 19:22

## 2021-08-04 RX ADMIN — HYDROMORPHONE HYDROCHLORIDE 0.5 MILLIGRAM(S): 2 INJECTION INTRAMUSCULAR; INTRAVENOUS; SUBCUTANEOUS at 06:51

## 2021-08-04 RX ADMIN — HYDROMORPHONE HYDROCHLORIDE 0.5 MILLIGRAM(S): 2 INJECTION INTRAMUSCULAR; INTRAVENOUS; SUBCUTANEOUS at 18:50

## 2021-08-04 RX ADMIN — PANTOPRAZOLE SODIUM 40 MILLIGRAM(S): 20 TABLET, DELAYED RELEASE ORAL at 05:50

## 2021-08-04 NOTE — PROGRESS NOTE ADULT - PROBLEM SELECTOR PLAN 3
TRUMAN significant for vegetation on posterior leaflet of mitral valve  - Off abx, comfort care   - No AC at this time in setting of patient being unable to tolerate PO

## 2021-08-04 NOTE — PROGRESS NOTE ADULT - SUBJECTIVE AND OBJECTIVE BOX
DATE OF SERVICE: 08-04-21    S: Remains lethargic in no distress, unable to obtain ROS.    Review of Systems:   Constitutional: [ ] fevers, [ ] chills.   Skin: [ ] dry skin. [ ] rashes.  Psychiatric: [ ] depression, [ ] anxiety.   Gastrointestinal: [ ] BRBPR, [ ] melena.   Neurological: [ ] confusion. [ ] seizures. [ ] shuffling gait.   Ears,Nose,Mouth and Throat: [ ] ear pain [ ] sore throat.   Eyes: [ ] diplopia.   Respiratory: [ ] hemoptysis. [ ] shortness of breath  Cardiovascular: See HPI above  Hematologic/Lymphatic: [ ] anemia. [ ] painful nodes. [ ] prolonged bleeding.   Genitourinary: [ ] hematuria. [ ] flank pain.   Endocrine: [ ] significant change in weight. [ ] intolerance to heat and cold.     Review of systems [ ] otherwise negative, [x ] otherwise unable to obtain    FH: no family history of sudden cardiac death in first degree relatives    SH: [ ] tobacco, [ ] alcohol, [ ] drugs      MEDICATIONS  (STANDING):  chlorhexidine 2% Cloths 1 Application(s) Topical daily  pantoprazole  Injectable 40 milliGRAM(s) IV Push two times a day    MEDICATIONS  (PRN):  acetaminophen   Tablet .. 1000 milliGRAM(s) Oral every 6 hours PRN Temp greater or equal to 38C (100.4F), Mild Pain (1 - 3), Moderate Pain (4 - 6)  calamine/zinc oxide Lotion 1 Application(s) Topical three times a day PRN Itching  HYDROmorphone  Injectable 0.5 milliGRAM(s) IV Push every 2 hours PRN pain or dyspnea  LORazepam   Injectable 0.5 milliGRAM(s) IV Push every 2 hours PRN Agitation  metoprolol tartrate Injectable 5 milliGRAM(s) IV Push every 6 hours PRN HR>100 and/or SBP>140      LABS:                          8.5    8.75  )-----------( 215      ( 04 Aug 2021 07:05 )             30.6     Hemoglobin: 8.5 g/dL (08-04 @ 07:05)    08-04    143  |  100  |  82<H>  ----------------------------<  50<LL>  5.6<H>   |  22  |  9.29<H>    Ca    9.3      04 Aug 2021 07:05  Phos  7.2     08-04  Mg     2.20     08-04      Creatinine Trend: 9.29<--, 3.71<--, 5.39<--, 4.14<--, 6.08<--, 5.06<--               PHYSICAL EXAM  Vital Signs Last 24 Hrs  T(C): 36.8 (04 Aug 2021 09:00), Max: 36.9 (03 Aug 2021 13:34)  T(F): 98.2 (04 Aug 2021 09:00), Max: 98.5 (03 Aug 2021 13:34)  HR: 85 (04 Aug 2021 09:00) (85 - 87)  BP: 137/72 (04 Aug 2021 09:00) (137/72 - 148/70)  BP(mean): --  RR: 18 (04 Aug 2021 09:00) (18 - 20)  SpO2: 100% (04 Aug 2021 09:00) (95% - 100%)        Gen: NAD  HEENT:  (-)icterus (-)pallor  CV: N S1 S2 1/6 DEJAH (+)2 Pulses B/l  Resp:  Clear to auscultation B/L, normal effort  GI: (+) BS Soft, NT, ND  Lymph:  (-)Edema, (-)obvious lymphadenopathy  Skin: Warm to touch, Normal turgor  Psych:  unable to adequately assess mood and affect    DATA  < from: Transthoracic Echocardiogram (07.07.21 @ 18:17) >  ------------------------------------------------------------------------  CONCLUSIONS:  1. Mitral annular calcification, otherwise normal mitral  valve. Minimal mitral regurgitation.  2. Endocardium not well visualized; grossly normal left  ventricular systolic function.  3. Right ventricular enlargement with decreased right  ventricular systolic function.  4. Inadequate tricuspid regurgitation Doppler envelope  precludes estimation of RVSP.  ------------------------------------------------------------------------  Confirmed on  7/7/2021 - 21:16:20 by Osvaldo Bertrand M.D.,  [facility], [facility] access hospital    < end of copied text >      A/P) 71 y/o male PMH CAD s/p CABG and old PCI, AFL x 1 year on coumadin, HTN, hyperlipidemia, DM, ESRD on HD, and hypothyroidism originally admitted to [facility] with GIB, transferred to [facility], found to have septic shock and MRSA bacteremia, trx to MICU s/p PEA arrest, intubated and now extubated    -Given MRSA bacteremia, TRUMAN performed demonstrating MV endocarditis - discussed with CTS Dr. Pierce - pt. not a surgical candidate at this time   -s/p abx, repeat BCx neg  -CTH head noted - pt. with cva  -Right thigh hematoma noted on CT scan - appreciate vascular evaluation - no surgical intervention required by vascular at this time  -Follow up palliative care, pt is DNR  -Plan for comfort care now pending inpatient hospice  -Supportive care per primary team  -No further inpatient cardiac w/u planned    Wilfred Robin PA-C  Pager: [phone]

## 2021-08-04 NOTE — PROGRESS NOTE ADULT - PROBLEM SELECTOR PLAN 1
- DNR/DNI. Evaluated by palliative care, recs appreciated. No further recommendations at this time.  - Decision is for comfort care- no HD, no blood draws. Focus on symptom-directed care   - Continue pleasure feeds   - Pain: IV dilaudid 0.5mg q2h for dyspnea or pain  - Son is interested in inpatient hospice and would not want home hospice as it would take a toll on his mother's health. Will f/u with SW re: hospice placement

## 2021-08-04 NOTE — CHART NOTE - NSCHARTNOTEFT_GEN_A_CORE
awaiting inpatient hospice bed at [facility].  will sign off. please resconsult if goals or symptoms change.  Radha Salcedo DO

## 2021-08-04 NOTE — PROGRESS NOTE ADULT - PROBLEM SELECTOR PLAN 7
- No HD per son, comfort care  - noted mild hyperkalemia --> will give lokelma 5g x1 dose but no need for further routine labs

## 2021-08-04 NOTE — PROGRESS NOTE ADULT - SUBJECTIVE AND OBJECTIVE BOX
Patient is a 70y old  Male who presents with a chief complaint of Chest pain and GIB (04 Aug 2021 12:21)    SUBJECTIVE / OVERNIGHT EVENTS:  Patient evaluated in the early afternoon. Patient with no acute complaints. Pt's wife, cousin and cousin's  present at bedside. Pt     MEDICATIONS  (STANDING):  chlorhexidine 2% Cloths 1 Application(s) Topical daily  pantoprazole  Injectable 40 milliGRAM(s) IV Push two times a day    MEDICATIONS  (PRN):  acetaminophen   Tablet .. 1000 milliGRAM(s) Oral every 6 hours PRN Temp greater or equal to 38C (100.4F), Mild Pain (1 - 3), Moderate Pain (4 - 6)  calamine/zinc oxide Lotion 1 Application(s) Topical three times a day PRN Itching  HYDROmorphone  Injectable 0.5 milliGRAM(s) IV Push every 2 hours PRN pain or dyspnea  LORazepam   Injectable 0.5 milliGRAM(s) IV Push every 2 hours PRN Agitation  metoprolol tartrate Injectable 5 milliGRAM(s) IV Push every 6 hours PRN HR>100 and/or SBP>140      Vital Signs Last 24 Hrs  T(C): 36.8 (04 Aug 2021 09:00), Max: 36.8 (04 Aug 2021 09:00)  T(F): 98.2 (04 Aug 2021 09:00), Max: 98.2 (04 Aug 2021 09:00)  HR: 85 (04 Aug 2021 09:00) (85 - 87)  BP: 137/72 (04 Aug 2021 09:00) (137/72 - 145/68)  BP(mean): --  RR: 18 (04 Aug 2021 09:00) (18 - 18)  SpO2: 100% (04 Aug 2021 09:00) (95% - 100%)  CAPILLARY BLOOD GLUCOSE      POCT Blood Glucose.: 108 mg/dL (04 Aug 2021 09:27)    I&O's Summary        PHYSICAL EXAM  GENERAL: NAD, lying in bed comfortably; on supplemental O2 via NC; +anasarca   CHEST/LUNG: Clear to auscultation bilaterally; No rales, rhonchi, wheezing, or rubs.   HEART: Regular rate and rhythm; No murmurs, rubs, or gallops  ABDOMEN: Soft, Nontender, Nondistended; Bowel sounds present; edema in dependent areas  EXTREMITIES:  b/l LE pitting edema   NERVOUS SYSTEM: Alert, pt able to recall names of his family members. Verbalizes "thank you"         LABS:                        8.5    8.75  )-----------( 215      ( 04 Aug 2021 07:05 )             30.6     08-04    143  |  100  |  82<H>  ----------------------------<  50<LL>  5.6<H>   |  22  |  9.29<H>    Ca    9.3      04 Aug 2021 07:05  Phos  7.2     08-04  Mg     2.20     08-04            Consultant(s) Notes Reviewed:  yes  Care Discussed with Consultants/Other Providers:

## 2021-08-04 NOTE — CHART NOTE - NSCHARTNOTESELECT_GEN_ALL_CORE
Event Note
Event Note
Follow Up/Nutrition Services
MAR Accept
Off Service Note
POCUS/Event Note
Event Note
Family Update/Event Note
Follow Up/Nutrition Services
MICU Accept Note/Event Note
POCUS Fellow/Event Note
POCUS/Event Note
Palliative Care/Event Note
THORACIC/Event Note
Thoracic Surgery
Thoracic Surgery/Event Note
Transfer Note
infectious disease/Event Note

## 2021-08-04 NOTE — PROGRESS NOTE ADULT - ASSESSMENT
71 yo M with PMHx of CAD s/p CABG (complicated by pleural effusion requiring chest tubes), ESRD on HD (TTS), DMII, HTN, afib on coumadin, and anemia transferred from [facility] for evaluation of chest pain after HD and GIB s/p 2U PRBC transfusion and aflutter/afib with RVR managed with metoprolol. Admitted to MICU for hypotension dependent on pressors likely 2/2 to sepsis. In MICU, patient had a cardiac arrest on 7/13 requiring CPR, 3x epi, and 2x shock (for VTach). Course complicated by recent drop in Hgb requiring pRBCs. CT of leg shows hematoma on right adductor. Patient lethargic, suggesting compromised mental status. AMS likely 2/2 R frontal CVA and anoxic brain injury in the setting of cardiac arrest. Patient transferred from MICU to floor. Blood cultures now growing Serratia. After discussion with son (HCP), decision is now to focus on comfort care.

## 2021-08-05 PROCEDURE — 99232 SBSQ HOSP IP/OBS MODERATE 35: CPT

## 2021-08-05 RX ORDER — HYDROMORPHONE HYDROCHLORIDE 2 MG/ML
0.5 INJECTION INTRAMUSCULAR; INTRAVENOUS; SUBCUTANEOUS
Refills: 0 | Status: DISCONTINUED | OUTPATIENT
Start: 2021-08-05 | End: 2021-08-09

## 2021-08-05 RX ADMIN — HYDROMORPHONE HYDROCHLORIDE 0.5 MILLIGRAM(S): 2 INJECTION INTRAMUSCULAR; INTRAVENOUS; SUBCUTANEOUS at 01:46

## 2021-08-05 RX ADMIN — HYDROMORPHONE HYDROCHLORIDE 0.5 MILLIGRAM(S): 2 INJECTION INTRAMUSCULAR; INTRAVENOUS; SUBCUTANEOUS at 02:30

## 2021-08-05 RX ADMIN — CHLORHEXIDINE GLUCONATE 1 APPLICATION(S): 213 SOLUTION TOPICAL at 16:02

## 2021-08-05 RX ADMIN — PANTOPRAZOLE SODIUM 40 MILLIGRAM(S): 20 TABLET, DELAYED RELEASE ORAL at 05:49

## 2021-08-05 RX ADMIN — PANTOPRAZOLE SODIUM 40 MILLIGRAM(S): 20 TABLET, DELAYED RELEASE ORAL at 17:49

## 2021-08-05 NOTE — PROGRESS NOTE ADULT - SUBJECTIVE AND OBJECTIVE BOX
Patient is a 70y old  Male who presents with a chief complaint of Chest pain and GIB (05 Aug 2021 13:05)      SUBJECTIVE / OVERNIGHT EVENTS:  Pt evaluated in the late afternoon. No acute events. Pt stable. Pt's wife present at bedside. Pt appeared comfortable. Pt awaiting placement to inpatient hospice    MEDICATIONS  (STANDING):  chlorhexidine 2% Cloths 1 Application(s) Topical daily  pantoprazole  Injectable 40 milliGRAM(s) IV Push two times a day  sodium zirconium cyclosilicate 5 Gram(s) Oral once    MEDICATIONS  (PRN):  acetaminophen   Tablet .. 1000 milliGRAM(s) Oral every 6 hours PRN Temp greater or equal to 38C (100.4F), Mild Pain (1 - 3), Moderate Pain (4 - 6)  calamine/zinc oxide Lotion 1 Application(s) Topical three times a day PRN Itching  HYDROmorphone  Injectable 0.5 milliGRAM(s) IV Push every 2 hours PRN pain or dyspnea  LORazepam   Injectable 0.5 milliGRAM(s) IV Push every 2 hours PRN Agitation  metoprolol tartrate Injectable 5 milliGRAM(s) IV Push every 6 hours PRN HR>100 and/or SBP>140      Vital Signs Last 24 Hrs  T(C): 36.8 (05 Aug 2021 20:24), Max: 36.8 (05 Aug 2021 20:24)  T(F): 98.3 (05 Aug 2021 20:24), Max: 98.3 (05 Aug 2021 20:24)  HR: 83 (05 Aug 2021 20:24) (82 - 88)  BP: 132/61 (05 Aug 2021 20:24) (130/92 - 160/90)  RR: 18 (05 Aug 2021 20:24) (17 - 19)  SpO2: 96% (05 Aug 2021 20:24) (93% - 100%)          PHYSICAL EXAM  GENERAL: NAD, lying in bed comfortably; on supplemental O2 via NC; +anasarca   CHEST/LUNG: Normal respiratory effort  HEART: Regular rate and rhythm;   ABDOMEN: Soft, Nontender, Nondistended; edema in dependent areas  EXTREMITIES:  b/l LE pitting edema

## 2021-08-05 NOTE — PROGRESS NOTE ADULT - ASSESSMENT
69 yo M with PMHx of CAD s/p CABG (complicated by pleural effusion requiring chest tubes), ESRD on HD (TTS), DMII, HTN, afib on coumadin, and anemia transferred from [facility] for evaluation of chest pain after HD and GIB s/p 2U PRBC transfusion and aflutter/afib with RVR managed with metoprolol. Admitted to MICU for hypotension dependent on pressors likely 2/2 to sepsis. In MICU, patient had a cardiac arrest on 7/13 requiring CPR, 3x epi, and 2x shock (for VTach). Course complicated by recent drop in Hgb requiring pRBCs. CT of leg shows hematoma on right adductor. Patient lethargic, suggesting compromised mental status. AMS likely 2/2 R frontal CVA and anoxic brain injury in the setting of cardiac arrest. Patient transferred from MICU to floor. Blood cultures now growing Serratia. After discussion with son (HCP), decision is now to focus on comfort care.

## 2021-08-05 NOTE — PROGRESS NOTE ADULT - SUBJECTIVE AND OBJECTIVE BOX
DATE OF SERVICE: 08-05-21    S: Remains lethargic in no distress, unable to obtain ROS.    Review of Systems:   Constitutional: [ ] fevers, [ ] chills.   Skin: [ ] dry skin. [ ] rashes.  Psychiatric: [ ] depression, [ ] anxiety.   Gastrointestinal: [ ] BRBPR, [ ] melena.   Neurological: [ ] confusion. [ ] seizures. [ ] shuffling gait.   Ears,Nose,Mouth and Throat: [ ] ear pain [ ] sore throat.   Eyes: [ ] diplopia.   Respiratory: [ ] hemoptysis. [ ] shortness of breath  Cardiovascular: See HPI above  Hematologic/Lymphatic: [ ] anemia. [ ] painful nodes. [ ] prolonged bleeding.   Genitourinary: [ ] hematuria. [ ] flank pain.   Endocrine: [ ] significant change in weight. [ ] intolerance to heat and cold.     Review of systems [ ] otherwise negative, [x ] otherwise unable to obtain    FH: no family history of sudden cardiac death in first degree relatives    SH: [ ] tobacco, [ ] alcohol, [ ] drugs    acetaminophen   Tablet .. 1000 milliGRAM(s) Oral every 6 hours PRN  calamine/zinc oxide Lotion 1 Application(s) Topical three times a day PRN  chlorhexidine 2% Cloths 1 Application(s) Topical daily  HYDROmorphone  Injectable 0.5 milliGRAM(s) IV Push every 2 hours PRN  LORazepam   Injectable 0.5 milliGRAM(s) IV Push every 2 hours PRN  metoprolol tartrate Injectable 5 milliGRAM(s) IV Push every 6 hours PRN  pantoprazole  Injectable 40 milliGRAM(s) IV Push two times a day  sodium zirconium cyclosilicate 5 Gram(s) Oral once                        8.5    8.75  )-----------( 215      ( 04 Aug 2021 07:05 )             30.6       143  |  100  |  82<H>  ----------------------------<  50<LL>  5.6<H>   |  22  |  9.29<H>    Ca    9.3      04 Aug 2021 07:05  Phos  7.2     08-04  Mg     2.20     08-04      T(C): 36.6 (08-05-21 @ 12:46), Max: 36.9 (08-04-21 @ 18:40)  HR: 88 (08-05-21 @ 12:46) (82 - 88)  BP: 130/92 (08-05-21 @ 12:46) (130/92 - 160/90)  RR: 17 (08-05-21 @ 12:46) (17 - 19)  SpO2: 99% (08-05-21 @ 12:46) (93% - 100%)      Gen: NAD  HEENT:  (-)icterus (-)pallor  CV: N S1 S2 1/6 DEJAH (+)2 Pulses B/l  Resp:  Clear to auscultation B/L, normal effort  GI: (+) BS Soft, NT, ND  Lymph:  (-)Edema, (-)obvious lymphadenopathy  Skin: Warm to touch, Normal turgor  Psych:  unable to adequately assess mood and affect    DATA  < from: Transthoracic Echocardiogram (07.07.21 @ 18:17) >  ------------------------------------------------------------------------  CONCLUSIONS:  1. Mitral annular calcification, otherwise normal mitral  valve. Minimal mitral regurgitation.  2. Endocardium not well visualized; grossly normal left  ventricular systolic function.  3. Right ventricular enlargement with decreased right  ventricular systolic function.  4. Inadequate tricuspid regurgitation Doppler envelope  precludes estimation of RVSP.  ------------------------------------------------------------------------  Confirmed on  7/7/2021 - 21:16:20 by Osvaldo Bertrand M.D.,  [facility], [place]    < end of copied text >      A/P) 69 y/o male PMH CAD s/p CABG and old PCI, AFL x 1 year on coumadin, HTN, hyperlipidemia, DM, ESRD on HD, and hypothyroidism originally admitted to [facility] with GIB, [facility], found to have septic shock and MRSA bacteremia, trx to MICU s/p PEA arrest, intubated and now extubated    -Given MRSA bacteremia, TRUMAN performed demonstrating MV endocarditis - discussed with CTS Dr. Pierce - pt. not a surgical candidate at this time   -s/p abx, repeat BCx neg  -CTH head noted - pt. with cva  -Right thigh hematoma noted on CT scan - appreciate vascular evaluation - no surgical intervention required by vascular at this time  -Follow up palliative care, pt is DNR  -Plan for comfort care now pending inpatient hospice  -Supportive care per primary team  -No further inpatient cardiac w/u planned

## 2021-08-05 NOTE — PROGRESS NOTE ADULT - PROBLEM SELECTOR PLAN 7
- No HD per son, comfort care  - noted mild hyperkalemia on 8/4 --> s/p lokelma 5g x1 dose but no need for further routine labs as per goals of care

## 2021-08-05 NOTE — PROGRESS NOTE ADULT - PROBLEM SELECTOR PLAN 2
Patient with blood cultures + for MRSA and Serratia  - s/p Vanc 750mg  and Cefepime 1g qd  - Cultures from 7/29/21; NGTD  - Off abx, comfort care

## 2021-08-06 PROCEDURE — 99232 SBSQ HOSP IP/OBS MODERATE 35: CPT

## 2021-08-06 RX ADMIN — HYDROMORPHONE HYDROCHLORIDE 0.5 MILLIGRAM(S): 2 INJECTION INTRAMUSCULAR; INTRAVENOUS; SUBCUTANEOUS at 20:21

## 2021-08-06 RX ADMIN — CHLORHEXIDINE GLUCONATE 1 APPLICATION(S): 213 SOLUTION TOPICAL at 08:02

## 2021-08-06 RX ADMIN — PANTOPRAZOLE SODIUM 40 MILLIGRAM(S): 20 TABLET, DELAYED RELEASE ORAL at 18:10

## 2021-08-06 RX ADMIN — HYDROMORPHONE HYDROCHLORIDE 0.5 MILLIGRAM(S): 2 INJECTION INTRAMUSCULAR; INTRAVENOUS; SUBCUTANEOUS at 20:04

## 2021-08-06 RX ADMIN — PANTOPRAZOLE SODIUM 40 MILLIGRAM(S): 20 TABLET, DELAYED RELEASE ORAL at 05:23

## 2021-08-06 RX ADMIN — HYDROMORPHONE HYDROCHLORIDE 0.5 MILLIGRAM(S): 2 INJECTION INTRAMUSCULAR; INTRAVENOUS; SUBCUTANEOUS at 10:45

## 2021-08-06 RX ADMIN — HYDROMORPHONE HYDROCHLORIDE 0.5 MILLIGRAM(S): 2 INJECTION INTRAMUSCULAR; INTRAVENOUS; SUBCUTANEOUS at 10:31

## 2021-08-06 NOTE — PROGRESS NOTE ADULT - SUBJECTIVE AND OBJECTIVE BOX
DATE OF SERVICE: 08-06-21    S: Remains lethargic in no distress, unable to obtain ROS.    Review of Systems:   Constitutional: [ ] fevers, [ ] chills.   Skin: [ ] dry skin. [ ] rashes.  Psychiatric: [ ] depression, [ ] anxiety.   Gastrointestinal: [ ] BRBPR, [ ] melena.   Neurological: [ ] confusion. [ ] seizures. [ ] shuffling gait.   Ears,Nose,Mouth and Throat: [ ] ear pain [ ] sore throat.   Eyes: [ ] diplopia.   Respiratory: [ ] hemoptysis. [ ] shortness of breath  Cardiovascular: See HPI above  Hematologic/Lymphatic: [ ] anemia. [ ] painful nodes. [ ] prolonged bleeding.   Genitourinary: [ ] hematuria. [ ] flank pain.   Endocrine: [ ] significant change in weight. [ ] intolerance to heat and cold.     Review of systems [ x] otherwise negative, [ ] otherwise unable to obtain    FH: no family history of sudden cardiac death in first degree relatives    SH: [ ] tobacco, [ ] alcohol, [ ] drugs    acetaminophen   Tablet .. 1000 milliGRAM(s) Oral every 6 hours PRN  calamine/zinc oxide Lotion 1 Application(s) Topical three times a day PRN  chlorhexidine 2% Cloths 1 Application(s) Topical daily  HYDROmorphone  Injectable 0.5 milliGRAM(s) IV Push every 2 hours PRN  LORazepam   Injectable 0.5 milliGRAM(s) IV Push every 2 hours PRN  metoprolol tartrate Injectable 5 milliGRAM(s) IV Push every 6 hours PRN  pantoprazole  Injectable 40 milliGRAM(s) IV Push two times a day  sodium zirconium cyclosilicate 5 Gram(s) Oral once      T(C): 36.6 (08-06-21 @ 05:07), Max: 36.8 (08-05-21 @ 20:24)  HR: 86 (08-06-21 @ 05:07) (83 - 86)  BP: 146/70 (08-06-21 @ 05:07) (132/61 - 146/70)  RR: 18 (08-06-21 @ 05:07) (18 - 18)  SpO2: 96% (08-06-21 @ 05:07) (96% - 96%)    General: Well nourished in no acute distress. Alert and Oriented * 3.   Head: Normocephalic and atraumatic.   Neck: No JVD. No bruits. Supple. Does not appear to be enlarged.   Cardiovascular: + S1,S2 ; RRR Soft systolic murmur at the left lower sternal border. No rubs noted.    Lungs: CTA b/l. No rhonchi, rales or wheezes.   Abdomen: + BS, soft. Non tender. Non distended. No rebound. No guarding.   Extremities: No clubbing/cyanosis/edema.   Neurologic: Moves all four extremities. Full range of motion.   Skin: Warm and moist. The patient's skin has normal elasticity and good skin turgor.   Psychiatric: Appropriate mood and affect.  Musculoskeletal: Normal range of motion, normal strength      DATA  < from: Transthoracic Echocardiogram (07.07.21 @ 18:17) >  ------------------------------------------------------------------------  CONCLUSIONS:  1. Mitral annular calcification, otherwise normal mitral  valve. Minimal mitral regurgitation.  2. Endocardium not well visualized; grossly normal left  ventricular systolic function.  3. Right ventricular enlargement with decreased right  ventricular systolic function.  4. Inadequate tricuspid regurgitation Doppler envelope  precludes estimation of RVSP.  ------------------------------------------------------------------------  Confirmed on  7/7/2021 - 21:16:20 by Osvaldo Bertrand M.D.,  [facility], [place]    < end of copied text >      A/P) 71 y/o male PMH CAD s/p CABG and old PCI, AFL x 1 year on coumadin, HTN, hyperlipidemia, DM, ESRD on HD, and hypothyroidism originally admitted to [facility] with GIB, transferred to [facility], found to have septic shock and MRSA bacteremia, trx to MICU s/p PEA arrest, intubated and now extubated    -Given MRSA bacteremia, TRUMAN performed demonstrating MV endocarditis - discussed with CTS Dr. Pierce - pt. not a surgical candidate at this time   -s/p abx, repeat BCx neg  -CTH head noted - pt. with cva  -Right thigh hematoma noted on CT scan - appreciate vascular evaluation - no surgical intervention required by vascular at this time  -Follow up palliative care, pt is DNR  -Plan for comfort care now pending inpatient hospice  -Supportive care per primary team  -No further inpatient cardiac w/u planned

## 2021-08-06 NOTE — PROGRESS NOTE ADULT - SUBJECTIVE AND OBJECTIVE BOX
Patient is a 70y old  Male who presents with a chief complaint of Chest pain and GIB (05 Aug 2021 20:32)    SUBJECTIVE / OVERNIGHT EVENTS:  Patient lethargic this AM. Pt's family at bedside. Pt briefly opens his eyes to voice and light touch but then goes back to sleep. Patient given PRN dose of Dilaudid this AM due to pain. Unable to obtain full ROS due to poor mentation    MEDICATIONS  (STANDING):  chlorhexidine 2% Cloths 1 Application(s) Topical daily  pantoprazole  Injectable 40 milliGRAM(s) IV Push two times a day  sodium zirconium cyclosilicate 5 Gram(s) Oral once    MEDICATIONS  (PRN):  acetaminophen   Tablet .. 1000 milliGRAM(s) Oral every 6 hours PRN Temp greater or equal to 38C (100.4F), Mild Pain (1 - 3), Moderate Pain (4 - 6)  calamine/zinc oxide Lotion 1 Application(s) Topical three times a day PRN Itching  HYDROmorphone  Injectable 0.5 milliGRAM(s) IV Push every 2 hours PRN pain or dyspnea  LORazepam   Injectable 0.5 milliGRAM(s) IV Push every 2 hours PRN Agitation  metoprolol tartrate Injectable 5 milliGRAM(s) IV Push every 6 hours PRN HR>100 and/or SBP>140      Vital Signs Last 24 Hrs  T(C): 36.6 (06 Aug 2021 05:07), Max: 36.8 (05 Aug 2021 20:24)  T(F): 97.9 (06 Aug 2021 05:07), Max: 98.3 (05 Aug 2021 20:24)  HR: 86 (06 Aug 2021 05:07) (83 - 88)  BP: 146/70 (06 Aug 2021 05:07) (130/92 - 146/70)  RR: 18 (06 Aug 2021 05:07) (17 - 18)  SpO2: 96% (06 Aug 2021 05:07) (96% - 99%)        PHYSICAL EXAM  GENERAL: lethargic, chronically ill appearing, laying in bed comfortably; on supplemental O2 via NC; +anasarca   CHEST/LUNG: Normal respiratory effort, no use of accessory respiratory muscles  HEART: Regular rate and rhythm;   ABDOMEN: Soft, Nontender, Nondistended; edema in dependent areas  EXTREMITIES:  b/l LE pitting edema

## 2021-08-06 NOTE — PROGRESS NOTE ADULT - ASSESSMENT
69 yo M with PMHx of CAD s/p CABG (complicated by pleural effusion requiring chest tubes), ESRD on HD (TTS), DMII, HTN, afib on coumadin, and anemia transferred from [facility] for evaluation of chest pain after HD and GIB s/p 2U PRBC transfusion and aflutter/afib with RVR managed with metoprolol. Admitted to MICU for hypotension dependent on pressors likely 2/2 to sepsis. In MICU, patient had a cardiac arrest on 7/13 requiring CPR, 3x epi, and 2x shock (for VTach). Course complicated by recent drop in Hgb requiring pRBCs. CT of leg shows hematoma on right adductor. Patient lethargic, suggesting compromised mental status. AMS likely 2/2 R frontal CVA and anoxic brain injury in the setting of cardiac arrest. Patient transferred from MICU to floor. Blood cultures now growing Serratia. After discussion with son (HCP), decision is now to focus on comfort care.

## 2021-08-07 PROCEDURE — 99232 SBSQ HOSP IP/OBS MODERATE 35: CPT

## 2021-08-07 RX ADMIN — PANTOPRAZOLE SODIUM 40 MILLIGRAM(S): 20 TABLET, DELAYED RELEASE ORAL at 07:13

## 2021-08-07 RX ADMIN — CHLORHEXIDINE GLUCONATE 1 APPLICATION(S): 213 SOLUTION TOPICAL at 11:47

## 2021-08-07 RX ADMIN — PANTOPRAZOLE SODIUM 40 MILLIGRAM(S): 20 TABLET, DELAYED RELEASE ORAL at 17:49

## 2021-08-07 NOTE — PROGRESS NOTE ADULT - SUBJECTIVE AND OBJECTIVE BOX
Patient is a 70y old  Male who presents with a chief complaint of Chest pain and GIB (07 Aug 2021 15:22)    SUBJECTIVE / OVERNIGHT EVENTS:  Patient seen and evaluated in the mid afternoon. Pt's son, Randal, present at bedside. Pt lethargic, but able to gesture he is not in pain. Pt's son confirms pt has appeared to be comfortable.      MEDICATIONS  (STANDING):  chlorhexidine 2% Cloths 1 Application(s) Topical daily  pantoprazole  Injectable 40 milliGRAM(s) IV Push two times a day  sodium zirconium cyclosilicate 5 Gram(s) Oral once    MEDICATIONS  (PRN):  acetaminophen   Tablet .. 1000 milliGRAM(s) Oral every 6 hours PRN Temp greater or equal to 38C (100.4F), Mild Pain (1 - 3), Moderate Pain (4 - 6)  calamine/zinc oxide Lotion 1 Application(s) Topical three times a day PRN Itching  HYDROmorphone  Injectable 0.5 milliGRAM(s) IV Push every 2 hours PRN pain or dyspnea  LORazepam   Injectable 0.5 milliGRAM(s) IV Push every 2 hours PRN Agitation  metoprolol tartrate Injectable 5 milliGRAM(s) IV Push every 6 hours PRN HR>100 and/or SBP>140      Vital Signs Last 24 Hrs  T(C): 36.6 (07 Aug 2021 12:09), Max: 36.8 (07 Aug 2021 06:15)  T(F): 97.8 (07 Aug 2021 12:09), Max: 98.3 (07 Aug 2021 06:15)  HR: 77 (07 Aug 2021 12:09) (71 - 77)  BP: 140/62 (07 Aug 2021 12:09) (140/62 - 145/63)  RR: 17 (07 Aug 2021 12:09) (15 - 19)  SpO2: 99% (07 Aug 2021 12:09) (97% - 100%)          PHYSICAL EXAM  GENERAL: lethargic, chronically ill appearing, laying in bed comfortably; on supplemental O2 via NC; +anasarca   CHEST/LUNG: Normal respiratory effort, no use of accessory respiratory muscles  ABDOMEN: Soft, Nontender, Nondistended; edema in dependent areas  EXTREMITIES:  b/l LE pitting edema         Consultant(s) Notes Reviewed:  yes  Care Discussed with Consultants/Other Providers:

## 2021-08-07 NOTE — PROGRESS NOTE ADULT - PROBLEM SELECTOR PLAN 7
- No HD per son, comfort care  - noted mild hyperkalemia on 8/4 --> s/p lokelma 5g x1 dose but no need for further routine labs or therapy as per goals of care      DISPO: Anticipate d/c to inpatient hospice to [facility] on Monday, 8/9/21.

## 2021-08-07 NOTE — PROGRESS NOTE ADULT - ASSESSMENT
71 yo M with PMHx of CAD s/p CABG (complicated by pleural effusion requiring chest tubes), ESRD on HD (TTS), DMII, HTN, afib on coumadin, and anemia transferred from [facility] for evaluation of chest pain after HD and GIB s/p 2U PRBC transfusion and aflutter/afib with RVR managed with metoprolol. Admitted to MICU for hypotension dependent on pressors likely 2/2 to sepsis. In MICU, patient had a cardiac arrest on 7/13 requiring CPR, 3x epi, and 2x shock (for VTach). Course complicated by recent drop in Hgb requiring pRBCs. CT of leg shows hematoma on right adductor. Patient lethargic, suggesting compromised mental status. AMS likely 2/2 R frontal CVA and anoxic brain injury in the setting of cardiac arrest. Patient transferred from MICU to floor. Blood cultures now growing Serratia. After discussion with son (HCP), decision is now to focus on comfort care. Pt pending transfer to inpatient hospice at [facility].

## 2021-08-07 NOTE — PROGRESS NOTE ADULT - PROBLEM SELECTOR PLAN 2
Patient with blood cultures + for MRSA and Serratia. Pt s/p course of Vanc 750mg  and Cefepime 1g qd  - Cultures from 7/29/21; NGTD  - Off abx, comfort care

## 2021-08-07 NOTE — PROGRESS NOTE ADULT - PROBLEM SELECTOR PLAN 1
- DNR/DNI. Evaluated by palliative care, recs appreciated. No further recommendations at this time.  - Decision is for comfort care- no HD, no blood draws. Focus on symptom-directed care   - Continue pleasure feeds   - Pain: IV dilaudid 0.5mg q2h for dyspnea or pain  - pending inpatient hospice

## 2021-08-07 NOTE — PROGRESS NOTE ADULT - SUBJECTIVE AND OBJECTIVE BOX
DATE OF SERVICE: 08-07-21    S: Remains lethargic in no distress, unable to obtain ROS.    Review of Systems:   Constitutional: [ ] fevers, [ ] chills.   Skin: [ ] dry skin. [ ] rashes.  Psychiatric: [ ] depression, [ ] anxiety.   Gastrointestinal: [ ] BRBPR, [ ] melena.   Neurological: [ ] confusion. [ ] seizures. [ ] shuffling gait.   Ears,Nose,Mouth and Throat: [ ] ear pain [ ] sore throat.   Eyes: [ ] diplopia.   Respiratory: [ ] hemoptysis. [ ] shortness of breath  Cardiovascular: See HPI above  Hematologic/Lymphatic: [ ] anemia. [ ] painful nodes. [ ] prolonged bleeding.   Genitourinary: [ ] hematuria. [ ] flank pain.   Endocrine: [ ] significant change in weight. [ ] intolerance to heat and cold.     Review of systems [x ] otherwise negative, [ ] otherwise unable to obtain    FH: no family history of sudden cardiac death in first degree relatives    SH: [ ] tobacco, [ ] alcohol, [ ] drugs    acetaminophen   Tablet .. 1000 milliGRAM(s) Oral every 6 hours PRN  calamine/zinc oxide Lotion 1 Application(s) Topical three times a day PRN  chlorhexidine 2% Cloths 1 Application(s) Topical daily  HYDROmorphone  Injectable 0.5 milliGRAM(s) IV Push every 2 hours PRN  LORazepam   Injectable 0.5 milliGRAM(s) IV Push every 2 hours PRN  metoprolol tartrate Injectable 5 milliGRAM(s) IV Push every 6 hours PRN  pantoprazole  Injectable 40 milliGRAM(s) IV Push two times a day  sodium zirconium cyclosilicate 5 Gram(s) Oral once    T(C): 36.6 (08-07-21 @ 12:09), Max: 36.8 (08-07-21 @ 06:15)  HR: 77 (08-07-21 @ 12:09) (71 - 77)  BP: 140/62 (08-07-21 @ 12:09) (140/62 - 145/63)  RR: 17 (08-07-21 @ 12:09) (15 - 19)  SpO2: 99% (08-07-21 @ 12:09) (97% - 100%)    General: Well nourished in no acute distress. Alert and Oriented * 3.   Head: Normocephalic and atraumatic.   Neck: No JVD. No bruits. Supple. Does not appear to be enlarged.   Cardiovascular: + S1,S2 ; RRR Soft systolic murmur at the left lower sternal border. No rubs noted.    Lungs: CTA b/l. No rhonchi, rales or wheezes.   Abdomen: + BS, soft. Non tender. Non distended. No rebound. No guarding.   Extremities: No clubbing/cyanosis/edema.   Neurologic: Moves all four extremities. Full range of motion.   Skin: Warm and moist. The patient's skin has normal elasticity and good skin turgor.   Psychiatric: Appropriate mood and affect.  Musculoskeletal: Normal range of motion, normal strength        DATA  < from: Transthoracic Echocardiogram (07.07.21 @ 18:17) >  ------------------------------------------------------------------------  CONCLUSIONS:  1. Mitral annular calcification, otherwise normal mitral  valve. Minimal mitral regurgitation.  2. Endocardium not well visualized; grossly normal left  ventricular systolic function.  3. Right ventricular enlargement with decreased right  ventricular systolic function.  4. Inadequate tricuspid regurgitation Doppler envelope  precludes estimation of RVSP.  ------------------------------------------------------------------------  Confirmed on  7/7/2021 - 21:16:20 by Osvaldo Bertrand M.D.,  [facility], JOHN    < end of copied text >      A/P) 69 y/o male PMH CAD s/p CABG and old PCI, AFL x 1 year on coumadin, HTN, hyperlipidemia, DM, ESRD on HD, and hypothyroidism originally admitted to [facility] with GIB, transferred to [facility], found to have septic shock and MRSA bacteremia, trx to MICU s/p PEA arrest, intubated and now extubated    -Given MRSA bacteremia, TRUMAN performed demonstrating MV endocarditis - discussed with CTS Dr. Pierce - pt. not a surgical candidate at this time   -s/p abx, repeat BCx neg  -CTH head noted - pt. with cva  -Right thigh hematoma noted on CT scan - appreciate vascular evaluation - no surgical intervention required by vascular at this time  -Follow up palliative care, pt is DNR  -Plan for comfort care now pending inpatient hospice  -Supportive care per primary team  -No further inpatient cardiac w/u planned

## 2021-08-08 ENCOUNTER — TRANSCRIPTION ENCOUNTER (OUTPATIENT)
Age: 71
End: 2021-08-08

## 2021-08-08 LAB — SARS-COV-2 RNA SPEC QL NAA+PROBE: SIGNIFICANT CHANGE UP

## 2021-08-08 PROCEDURE — 99232 SBSQ HOSP IP/OBS MODERATE 35: CPT

## 2021-08-08 RX ADMIN — PANTOPRAZOLE SODIUM 40 MILLIGRAM(S): 20 TABLET, DELAYED RELEASE ORAL at 17:21

## 2021-08-08 RX ADMIN — PANTOPRAZOLE SODIUM 40 MILLIGRAM(S): 20 TABLET, DELAYED RELEASE ORAL at 05:18

## 2021-08-08 RX ADMIN — CHLORHEXIDINE GLUCONATE 1 APPLICATION(S): 213 SOLUTION TOPICAL at 13:00

## 2021-08-08 NOTE — DISCHARGE NOTE PROVIDER - NSDCCPCAREPLAN_GEN_ALL_CORE_FT
PRINCIPAL DISCHARGE DIAGNOSIS  Diagnosis: Goals of care, counseling/discussion  Assessment and Plan of Treatment: DNR/DNI. Decision is for comfort care- no HD, no blood draws. Focus on symptom-directed care.   Continue pleasure feeds  You are being discharged to inpatient hospice and it is recommended to focus on comfort measures and supportive care. Continue with medications prescribed for pain and other symptom control.      SECONDARY DISCHARGE DIAGNOSES  Diagnosis: Bacteremia  Assessment and Plan of Treatment: Blood cultures positive for MRSA and Serratia, you completed antibiotics. Your repeat cultures are negative till date.   Off antibiotics now, goal is comfort care.    Diagnosis: Endocarditis, unspecified chronicity, unspecified endocarditis type  Assessment and Plan of Treatment: TRUMAN Echocardiogram significant for mitral valve vegetation - discussed with CTSurgery Dr. Pierce - not a surgical candidate at this time.   Off antibiotics, comfort care.   - Right thigh hematoma noted on CT scan -no surgical intervention indicated by vascular at this time    Diagnosis: Atrial fibrillation, unspecified type  Assessment and Plan of Treatment: Sinus rhythm currently, No pacemaker or ICD indicated at this time  Continue lopressor as prescribed    Diagnosis: Cerebrovascular accident (CVA), unspecified mechanism  Assessment and Plan of Treatment: CT head with CVA. Patient being held off on AC at this time per cardioogy. Comfort measures    Aspiration precautions with current diet-pleasure feeds    Diagnosis: ESRD (end stage renal disease) on dialysis  Assessment and Plan of Treatment: No further dialysis as per family request, comfort care     PRINCIPAL DISCHARGE DIAGNOSIS  Diagnosis: Goals of care, counseling/discussion  Assessment and Plan of Treatment: DNR/DNI. Decision is for comfort care- no HD, no blood draws. Focus on symptom-directed care.   Continue pleasure feeds  You are being discharged to inpatient hospice and it is recommended to focus on comfort measures and supportive care. Continue with medications prescribed for pain and other symptom control.      SECONDARY DISCHARGE DIAGNOSES  Diagnosis: ESRD (end stage renal disease) on dialysis  Assessment and Plan of Treatment: No further dialysis as per family request, comfort care    Diagnosis: Atrial fibrillation, unspecified type  Assessment and Plan of Treatment: Sinus rhythm currently, No pacemaker or ICD indicated at this time  Continue lopressor as prescribed    Diagnosis: Bacteremia  Assessment and Plan of Treatment: Blood cultures positive for MRSA and Serratia, you completed antibiotics. Your repeat cultures are negative till date.   Off antibiotics now, goal is comfort care.    Diagnosis: Endocarditis, unspecified chronicity, unspecified endocarditis type  Assessment and Plan of Treatment: TRUMAN Echocardiogram significant for mitral valve vegetation - discussed with CTSurgery Dr. Pierce - not a surgical candidate at this time.   Off antibiotics, comfort care.   - Right thigh hematoma noted on CT scan -no surgical intervention indicated by vascular at this time    Diagnosis: Cerebrovascular accident (CVA), unspecified mechanism  Assessment and Plan of Treatment: CT head with CVA. Patient being held off on AC at this time per cardioogy. Comfort measures    Aspiration precautions with current diet-pleasure feeds

## 2021-08-08 NOTE — PROGRESS NOTE ADULT - ASSESSMENT
69 yo M with PMHx of CAD s/p CABG (complicated by pleural effusion requiring chest tubes), ESRD on HD (TTS), DMII, HTN, afib on coumadin, and anemia transferred from [facility] for evaluation of chest pain after HD and GIB s/p 2U PRBC transfusion and aflutter/afib with RVR managed with metoprolol. Admitted to MICU for hypotension dependent on pressors likely 2/2 to sepsis. In MICU, patient had a cardiac arrest on 7/13 requiring CPR, 3x epi, and 2x shock (for VTach). Course complicated by recent drop in Hgb requiring pRBCs. CT of leg shows hematoma on right adductor. Patient lethargic, suggesting compromised mental status. AMS likely 2/2 R frontal CVA and anoxic brain injury in the setting of cardiac arrest. Patient transferred from MICU to floor. Blood cultures now growing Serratia. After discussion with son (HCP), decision is now to focus on comfort care. Pt pending transfer to inpatient hospice at [facility].

## 2021-08-08 NOTE — PROGRESS NOTE ADULT - ATTENDING SUPERVISION STATEMENT
ACP
Resident
ACP
ACP
Resident
Student
Resident
Resident/Student
Resident
Student
Student
Resident

## 2021-08-08 NOTE — DISCHARGE NOTE PROVIDER - NSDCMRMEDTOKEN_GEN_ALL_CORE_FT
acetaminophen 325 mg oral tablet: 2 tab(s) orally every 6 hours, As needed, Moderate Pain (4 - 6)  amiodarone 200 mg oral tablet: 1 tab(s) orally once a day   aspirin 81 mg oral delayed release tablet: 1 tab(s) orally once a day  atorvastatin 40 mg oral tablet: 1 tab(s) orally once a day  Coumadin 5 mg oral tablet: 1 tab(s) orally once a day  darbepoetin danilo 25 mcg/0.42 mL injectable solution: 25 microgram(s) injectable every 7 days  lidocaine 5% patch: 1 patch transdermal once a day  Lopressor: 25 milligram(s) orally 2 times a day   acetaminophen 325 mg oral tablet: 2 tab(s) orally every 6 hours, As needed, Moderate Pain (4 - 6)  calamine topical lotion: 1 application topically 3 times a day, As needed, Itching  HYDROmorphone: 0.5 milligram(s) intravenous every 2 hours, As Needed for dyspnea  lidocaine 5% patch: 1 patch transdermal once a day  LORazepam: 0.5 milligram(s) intravenous every 2 hours, As Needed for agitation  metoprolol tartrate 1 mg/mL injectable solution: 5 milligram(s) intravenous every 6 hours for HR &gt;100, SBP &gt;140  pantoprazole 40 mg intravenous injection: 40 milligram(s) intravenous once a day

## 2021-08-08 NOTE — PROGRESS NOTE ADULT - SUBJECTIVE AND OBJECTIVE BOX
NO distress this am , NO events overnight    Review of Systems:   Constitutional: [ ] fevers, [ ] chills.   Skin: [ ] dry skin. [ ] rashes.  Psychiatric: [ ] depression, [ ] anxiety.   Gastrointestinal: [ ] BRBPR, [ ] melena.   Neurological: [ ] confusion. [ ] seizures. [ ] shuffling gait.   Ears,Nose,Mouth and Throat: [ ] ear pain [ ] sore throat.   Eyes: [ ] diplopia.   Respiratory: [ ] hemoptysis. [ ] shortness of breath  Cardiovascular: See HPI above  Hematologic/Lymphatic: [ ] anemia. [ ] painful nodes. [ ] prolonged bleeding.   Genitourinary: [ ] hematuria. [ ] flank pain.   Endocrine: [ ] significant change in weight. [ ] intolerance to heat and cold.     Review of systems [x ] otherwise negative, [ ] otherwise unable to obtain    FH: no family history of sudden cardiac death in first degree relatives    SH: [ ] tobacco, [ ] alcohol, [ ] drugs         acetaminophen   Tablet .. 1000 milliGRAM(s) Oral every 6 hours PRN  calamine/zinc oxide Lotion 1 Application(s) Topical three times a day PRN  chlorhexidine 2% Cloths 1 Application(s) Topical daily  HYDROmorphone  Injectable 0.5 milliGRAM(s) IV Push every 2 hours PRN  LORazepam   Injectable 0.5 milliGRAM(s) IV Push every 2 hours PRN  metoprolol tartrate Injectable 5 milliGRAM(s) IV Push every 6 hours PRN  pantoprazole  Injectable 40 milliGRAM(s) IV Push two times a day  sodium zirconium cyclosilicate 5 Gram(s) Oral once          Hemoglobin: 8.5 g/dL (08-04 @ 07:05)            Creatinine Trend: 9.29<--, 3.71<--, 5.39<--, 4.14<--, 6.08<--, 5.06<--    COAGS:           T(C): 36.5 (08-08-21 @ 05:24), Max: 36.8 (08-07-21 @ 21:32)  HR: 95 (08-08-21 @ 05:24) (66 - 95)  BP: 135/54 (08-08-21 @ 05:24) (124/51 - 140/62)  RR: 18 (08-08-21 @ 05:24) (17 - 20)  SpO2: 96% (08-08-21 @ 05:24) (95% - 99%)  Wt(kg): --    I&O's Summary    lethargic   no JVD  diminished silvestre , no wheese  regs s1s2   non distended, + bs , soft   + Silvestre edema        DATA  < from: Transthoracic Echocardiogram (07.07.21 @ 18:17) >  ------------------------------------------------------------------------  CONCLUSIONS:  1. Mitral annular calcification, otherwise normal mitral  valve. Minimal mitral regurgitation.  2. Endocardium not well visualized; grossly normal left  ventricular systolic function.  3. Right ventricular enlargement with decreased right  ventricular systolic function.  4. Inadequate tricuspid regurgitation Doppler envelope  precludes estimation of RVSP.  ------------------------------------------------------------------------  Confirmed on  7/7/2021 - 21:16:20 by Osvaldo Bertrand M.D.,  [facility], [facility]    < end of copied text >      A/P) 69 y/o male PMH CAD s/p CABG and old PCI, AFL x 1 year on coumadin, HTN, hyperlipidemia, DM, ESRD on HD, and hypothyroidism originally admitted to [facility] with GIB, transferred to [facility], found to have septic shock and MRSA bacteremia, trx to MICU s/p PEA arrest, intubated and now extubated    -Given MRSA bacteremia, TRUMAN performed demonstrating MV endocarditis - discussed with CTS Dr. Pierce - pt. not a surgical candidate at this time   -s/p abx, repeat BCx neg  -CTH head noted - pt. with cva  -Right thigh hematoma noted on CT scan - appreciate vascular evaluation - no surgical intervention required by vascular at this time  -Follow up palliative care, pt is DNR  -Plan for comfort care now pending inpatient hospice  -Supportive care per primary team  -No further inpatient cardiac w/u planned

## 2021-08-08 NOTE — PROGRESS NOTE ADULT - SUBJECTIVE AND OBJECTIVE BOX
Patient is a 70y old  Male who presents with a chief complaint of Chest pain and GIB (08 Aug 2021 08:05)      SUBJECTIVE / OVERNIGHT EVENTS:  No events overnight. Pt drowsy and unable to communicate verbally. No reports from nursing stating pt appeared uncomfortable.     MEDICATIONS  (STANDING):  chlorhexidine 2% Cloths 1 Application(s) Topical daily  pantoprazole  Injectable 40 milliGRAM(s) IV Push two times a day  sodium zirconium cyclosilicate 5 Gram(s) Oral once    MEDICATIONS  (PRN):  acetaminophen   Tablet .. 1000 milliGRAM(s) Oral every 6 hours PRN Temp greater or equal to 38C (100.4F), Mild Pain (1 - 3), Moderate Pain (4 - 6)  calamine/zinc oxide Lotion 1 Application(s) Topical three times a day PRN Itching  HYDROmorphone  Injectable 0.5 milliGRAM(s) IV Push every 2 hours PRN pain or dyspnea  LORazepam   Injectable 0.5 milliGRAM(s) IV Push every 2 hours PRN Agitation  metoprolol tartrate Injectable 5 milliGRAM(s) IV Push every 6 hours PRN HR>100 and/or SBP>140      Vital Signs Last 24 Hrs  T(C): 37.8 (08 Aug 2021 13:01), Max: 37.8 (08 Aug 2021 13:01)  T(F): 100 (08 Aug 2021 13:01), Max: 100 (08 Aug 2021 13:01)  HR: 71 (08 Aug 2021 13:01) (66 - 95)  BP: 133/51 (08 Aug 2021 13:01) (124/51 - 135/54)  RR: 18 (08 Aug 2021 13:01) (18 - 20)  SpO2: 97% (08 Aug 2021 13:01) (95% - 97%)          PHYSICAL EXAM  GENERAL: lethargic, chronically ill appearing, laying in bed comfortably; on 4L supplemental O2 via NC; +anasarca   CHEST/LUNG: Normal respiratory effort, no use of accessory respiratory muscles  ABDOMEN: Soft, Nontender, Nondistended; edema in dependent areas  EXTREMITIES:  b/l LE pitting edema

## 2021-08-08 NOTE — DISCHARGE NOTE PROVIDER - NSDCFUSCHEDAPPT_GEN_ALL_CORE_FT
VIOLETTA RENTERIA ; 09/27/2021 ; NP Surg Vasc [address] ; 09/27/2021 ; [facility] Surg [facility] [address] ; 09/27/2021 ; [facility] Surg [facility] [address] ; 09/27/2021 ; [facility] Surg [facility]asc [address]

## 2021-08-08 NOTE — DISCHARGE NOTE PROVIDER - HOSPITAL COURSE
69 yo M with PMHx of CAD s/p CABG (complicated by pleural effusion requiring chest tubes), ESRD on HD (TTS), DMII, HTN, afib on coumadin, and anemia transferred from [facility] for evaluation of chest pain after HD and GIB s/p 2U PRBC transfusion and aflutter/afib with RVR managed with metoprolol. Admitted to MICU for hypotension dependent on pressors likely 2/2 to sepsis. In MICU, patient had a cardiac arrest on 7/13 requiring CPR, 3x epi, and 2x shock (for VTach). Course complicated by recent drop in Hgb requiring pRBCs. CT of leg shows hematoma on right adductor. Patient lethargic, suggesting compromised mental status. AMS likely 2/2 R frontal CVA and anoxic brain injury in the setting of cardiac arrest. Patient transferred from MICU to medicine floor. Blood cultures now growing Serratia. After discussion with son (HCP), decision is now to focus on comfort care.  Pt transfered to inpatient hospice at [facility].    Hospital Course:   Goals of care, counseling/discussion.    - DNR/DNI. Evaluated by palliative care, recs appreciated. No further recommendations at this time.  - Decision is for comfort care- no HD, no blood draws. Focus on symptom-directed care   - Continue pleasure feeds   - Pain control IV dilaudid 0.5mg q2h for dyspnea or pain  - inpatient hospice     Bacteremia.    - blood cultures + for MRSA and Serratia- s/p Vanco/Cefepime   - Cultures from 7/29/21; NGTD   - Off antibitics, comfort care.      Endocarditis, unspecified chronicity, unspecified endocarditis type.    - TRUMAN significant for vegetation on posterior leaflet of mitral valve- discussed with CTS Dr. Pierce - patient not a surgical candidate at this time   - Off abx, comfort care   - No AC at this time in setting of patient being unable to tolerate PO.   - Right thigh hematoma noted on CT scan - appreciate vascular evaluation - no surgical intervention required by vascular at this time    Cerebrovascular accident (CVA), unspecified mechanism.    - Patient with right frontal infarct found on CT, most recent CTH demonstrates no new acute pathology, only evolving R infarct  - Etiology unknown, but potentially in setting of cardiac arrest vs AF vs septic emboli  - Patient being held off on AC at this time per cardio as unable to tolerate PO    Atrial fibrillation, unspecified type.    - Sinus rhythm currently  - s/p PEA/jay/asystole arrest, secondary to critical illness and infection.  No pacemaker or ICD indicated at this time as per EP  - IV lopressor 5mg for HR > 100 and/or SBP >140.     Hypertension, unspecified type.   - Patient normotensive at this time, holding meds in this setting     ESRD (end stage renal disease) on dialysis.    - No HD per son, comfort care  - noted mild hyperkalemia on 8/4 --> s/p lokelma 5g x1 dose but no need for further routine labs as per goals of care    Dispo: [facility]    On 8/9/21, case was discussed with __________, patient is medically cleared and optimized for discharge today. All medications were reviewed with attending, and sent to mutually agreed upon pharmacy.       69 yo M with PMHx of CAD s/p CABG (complicated by pleural effusion requiring chest tubes), ESRD on HD (TTS), DMII, HTN, afib on coumadin, and anemia transferred from [facility] for evaluation of chest pain after HD and GIB s/p 2U PRBC transfusion and aflutter/afib with RVR managed with metoprolol. Admitted to MICU for hypotension dependent on pressors likely 2/2 to sepsis. In MICU, patient had a cardiac arrest on 7/13 requiring CPR, 3x epi, and 2x shock (for VTach). Course complicated by recent drop in Hgb requiring pRBCs. CT of leg shows hematoma on right adductor. Patient lethargic, suggesting compromised mental status. AMS likely 2/2 R frontal CVA and anoxic brain injury in the setting of cardiac arrest. Patient transferred from MICU to medicine floor. Blood cultures now growing Serratia. After discussion with son (HCP), decision is now to focus on comfort care.  Pt transfered to inpatient hospice at [facility].    Hospital Course:   Goals of care, counseling/discussion- DNR/DNI. Evaluated by palliative care, recs appreciated. No further recommendations at this time.  - Decision is for comfort care- no HD, no blood draws. Focus on symptom-directed care   - Continue pleasure feeds   - Pain control IV dilaudid 0.5mg q2h for dyspnea or pain  - inpatient hospice     Bacteremia- blood cultures + for MRSA and Serratia- s/p Vanco/Cefepime   - Cultures from 7/29/21; NGTD   - Off antibitics, comfort care     Endocarditis, unspecified chronicity, unspecified endocarditis type- TRUMAN significant for vegetation on posterior leaflet of mitral valve- discussed with CTS Dr. Pierce - patient not a surgical candidate at this time   - Off abx, comfort care   - No AC at this time in setting of patient being unable to tolerate PO  - Right thigh hematoma noted on CT scan - appreciate vascular evaluation - no surgical intervention required by vascular at this time    Cerebrovascular accident (CVA), unspecified mechanism- Patient with right frontal infarct found on CT, most recent CTH demonstrates no new acute pathology, only evolving R infarct  - Etiology unknown, but potentially in setting of cardiac arrest vs AF vs septic emboli  - Patient being held off on AC at this time per cardio as unable to tolerate PO    Atrial fibrillation- Sinus rhythm currently  - s/p PEA/jay/asystole arrest, secondary to critical illness and infection.  No pacemaker or ICD indicated at this time as per EP  - IV lopressor 5mg for HR > 100 and/or SBP >140     Hypertension- Patient normotensive at this time, holding meds in this setting     ESRD (end stage renal disease) on dialysis- No HD per son, comfort care  - noted mild hyperkalemia on 8/4 --> s/p lokelma 5g x1 dose but no need for further routine labs as per goals of care    Dispo: [facility]    On 8/9/21, case was discussed with Dr Pascal, patient is medically cleared and optimized for discharge today. All medications were reviewed with attending, and sent to mutually agreed upon pharmacy.

## 2021-08-08 NOTE — DISCHARGE NOTE PROVIDER - INSTRUCTIONS
dysphagia diet pured with honey thick consistency  pleasure feeds  aspiration precautions  dysphagia diet purred with honey thick consistency  pleasure feeds  aspiration precautions

## 2021-08-09 ENCOUNTER — TRANSCRIPTION ENCOUNTER (OUTPATIENT)
Age: 71
End: 2021-08-09

## 2021-08-09 VITALS
RESPIRATION RATE: 18 BRPM | DIASTOLIC BLOOD PRESSURE: 65 MMHG | SYSTOLIC BLOOD PRESSURE: 137 MMHG | TEMPERATURE: 97 F | OXYGEN SATURATION: 98 % | HEART RATE: 95 BPM

## 2021-08-09 LAB — GLUCOSE BLDC GLUCOMTR-MCNC: 97 MG/DL — SIGNIFICANT CHANGE UP (ref 70–99)

## 2021-08-09 PROCEDURE — 99239 HOSP IP/OBS DSCHRG MGMT >30: CPT

## 2021-08-09 RX ORDER — PANTOPRAZOLE SODIUM 20 MG/1
40 TABLET, DELAYED RELEASE ORAL
Qty: 0 | Refills: 0 | DISCHARGE
Start: 2021-08-09

## 2021-08-09 RX ORDER — METOPROLOL TARTRATE 50 MG
5 TABLET ORAL
Qty: 0 | Refills: 0 | DISCHARGE
Start: 2021-08-09

## 2021-08-09 RX ORDER — ATORVASTATIN CALCIUM 80 MG/1
1 TABLET, FILM COATED ORAL
Qty: 0 | Refills: 0 | DISCHARGE

## 2021-08-09 RX ORDER — DARBEPOETIN ALFA IN POLYSORBAT 200MCG/0.4
25 PEN INJECTOR (ML) SUBCUTANEOUS
Qty: 0 | Refills: 0 | DISCHARGE

## 2021-08-09 RX ORDER — METOPROLOL TARTRATE 50 MG
25 TABLET ORAL
Qty: 0 | Refills: 0 | DISCHARGE

## 2021-08-09 RX ORDER — CALAMINE AND ZINC OXIDE AND PHENOL 160; 10 MG/ML; MG/ML
1 LOTION TOPICAL
Qty: 0 | Refills: 0 | DISCHARGE
Start: 2021-08-09

## 2021-08-09 RX ORDER — WARFARIN SODIUM 2.5 MG/1
1 TABLET ORAL
Qty: 0 | Refills: 0 | DISCHARGE

## 2021-08-09 RX ORDER — HYDROMORPHONE HYDROCHLORIDE 2 MG/ML
0.5 INJECTION INTRAMUSCULAR; INTRAVENOUS; SUBCUTANEOUS
Qty: 0 | Refills: 0 | DISCHARGE
Start: 2021-08-09

## 2021-08-09 RX ADMIN — HYDROMORPHONE HYDROCHLORIDE 0.5 MILLIGRAM(S): 2 INJECTION INTRAMUSCULAR; INTRAVENOUS; SUBCUTANEOUS at 10:40

## 2021-08-09 RX ADMIN — HYDROMORPHONE HYDROCHLORIDE 0.5 MILLIGRAM(S): 2 INJECTION INTRAMUSCULAR; INTRAVENOUS; SUBCUTANEOUS at 10:26

## 2021-08-09 RX ADMIN — PANTOPRAZOLE SODIUM 40 MILLIGRAM(S): 20 TABLET, DELAYED RELEASE ORAL at 05:06

## 2021-08-09 NOTE — DISCHARGE NOTE NURSING/CASE MANAGEMENT/SOCIAL WORK - PATIENT PORTAL LINK FT
You can access the FollowMyHealth Patient Portal offered by [facility] by registering at the following website: [email or web address]. By joining [facility]’s FollowMyHealth portal, you will also be able to view your health information using other applications (apps) compatible with our system.

## 2021-08-09 NOTE — PROGRESS NOTE ADULT - SUBJECTIVE AND OBJECTIVE BOX
[facility] Division of Hospital Medicine  Hospitalist: Yanci Pascal MD   Pager: [phone]    CHIEF COMPLAINT: Patient is a 70y old  Male who presents with a chief complaint of Chest pain and GIB (09 Aug 2021 12:17)    SUBJECTIVE / OVERNIGHT EVENTS: Patient seen and examined. On comfort care.    ADDITIONAL REVIEW OF SYSTEMS:    MEDICATIONS  (STANDING):  chlorhexidine 2% Cloths 1 Application(s) Topical daily  pantoprazole  Injectable 40 milliGRAM(s) IV Push two times a day  sodium zirconium cyclosilicate 5 Gram(s) Oral once    MEDICATIONS  (PRN):  acetaminophen   Tablet .. 1000 milliGRAM(s) Oral every 6 hours PRN Temp greater or equal to 38C (100.4F), Mild Pain (1 - 3), Moderate Pain (4 - 6)  calamine/zinc oxide Lotion 1 Application(s) Topical three times a day PRN Itching  HYDROmorphone  Injectable 0.5 milliGRAM(s) IV Push every 2 hours PRN pain or dyspnea  LORazepam   Injectable 0.5 milliGRAM(s) IV Push every 2 hours PRN Agitation  metoprolol tartrate Injectable 5 milliGRAM(s) IV Push every 6 hours PRN HR>100 and/or SBP>140      PHYSICAL EXAM:  Vital Signs Last 24 Hrs  T(C): 36.3 (09 Aug 2021 13:49), Max: 36.9 (08 Aug 2021 21:16)  T(F): 97.3 (09 Aug 2021 13:49), Max: 98.4 (08 Aug 2021 21:16)  HR: 95 (09 Aug 2021 13:49) (95 - 98)  BP: 137/65 (09 Aug 2021 13:49) (135/58 - 149/71)  BP(mean): --  RR: 18 (09 Aug 2021 13:49) (18 - 19)  SpO2: 98% (09 Aug 2021 13:49) (95% - 98%)    GENERAL: lethargic, chronically ill appearing, laying in bed comfortably; on 4L supplemental O2 via NC; +anasarca   CHEST/LUNG: Normal respiratory effort, no use of accessory respiratory muscles  ABDOMEN: Soft, Nontender, Nondistended; edema in dependent areas  EXTREMITIES:  b/l LE pitting edema     LABS:    CAPILLARY BLOOD GLUCOSE  POCT Blood Glucose.: 97 mg/dL (09 Aug 2021 08:13)    RADIOLOGY & ADDITIONAL TESTS:  Results Reviewed:   Imaging Personally Reviewed:  EKG Personally Reviewed:    COORDINATION OF CARE:  Care Discussed with Consultants/Other Providers: ACP - discussed discharging to hospice  Prior or Outpatient Records Reviewed:

## 2021-08-09 NOTE — PROGRESS NOTE ADULT - SUBJECTIVE AND OBJECTIVE BOX
Date of service: 08/09/2021    S: no events; ros limited.     Review of Systems:   Constitutional: [ ] fevers, [ ] chills.   Skin: [ ] dry skin. [ ] rashes.  Psychiatric: [ ] depression, [ ] anxiety.   Gastrointestinal: [ ] BRBPR, [ ] melena.   Neurological: [ ] confusion. [ ] seizures. [ ] shuffling gait.   Ears,Nose,Mouth and Throat: [ ] ear pain [ ] sore throat.   Eyes: [ ] diplopia.   Respiratory: [ ] hemoptysis. [ ] shortness of breath  Cardiovascular: See HPI above  Hematologic/Lymphatic: [ ] anemia. [ ] painful nodes. [ ] prolonged bleeding.   Genitourinary: [ ] hematuria. [ ] flank pain.   Endocrine: [ ] significant change in weight. [ ] intolerance to heat and cold.     Review of systems [ ] otherwise negative, [x ] otherwise unable to obtain    FH: no family history of sudden cardiac death in first degree relatives    SH: [ ] tobacco, [ ] alcohol, [ ] drugs       acetaminophen   Tablet .. 1000 milliGRAM(s) Oral every 6 hours PRN  calamine/zinc oxide Lotion 1 Application(s) Topical three times a day PRN  chlorhexidine 2% Cloths 1 Application(s) Topical daily  HYDROmorphone  Injectable 0.5 milliGRAM(s) IV Push every 2 hours PRN  LORazepam   Injectable 0.5 milliGRAM(s) IV Push every 2 hours PRN  metoprolol tartrate Injectable 5 milliGRAM(s) IV Push every 6 hours PRN  pantoprazole  Injectable 40 milliGRAM(s) IV Push two times a day  sodium zirconium cyclosilicate 5 Gram(s) Oral once                        T(C): 36.7 (08-09-21 @ 05:15), Max: 37.8 (08-08-21 @ 13:01)  HR: 97 (08-09-21 @ 05:15) (71 - 98)  BP: 135/58 (08-09-21 @ 05:15) (133/51 - 149/71)  RR: 19 (08-09-21 @ 05:15) (18 - 19)  SpO2: 95% (08-09-21 @ 05:15) (95% - 97%)  Wt(kg): --    I&O's Summary      lethargic   no JVD  diminished karan , no wheese  regs s1s2   non distended, + bs , soft   + Karan edema        DATA  < from: Transthoracic Echocardiogram (07.07.21 @ 18:17) >  ------------------------------------------------------------------------  CONCLUSIONS:  1. Mitral annular calcification, otherwise normal mitral  valve. Minimal mitral regurgitation.  2. Endocardium not well visualized; grossly normal left  ventricular systolic function.  3. Right ventricular enlargement with decreased right  ventricular systolic function.  4. Inadequate tricuspid regurgitation Doppler envelope  precludes estimation of RVSP.  ------------------------------------------------------------------------  Confirmed on  7/7/2021 - 21:16:20 by Osvaldo Bertrand M.D.,  Formerly [facility], [facility]    < end of copied text >      A/P) 71 y/o male PMH CAD s/p CABG and old PCI, AFL x 1 year on coumadin, HTN, hyperlipidemia, DM, ESRD on HD, and hypothyroidism originally admitted to [facility] with GIB, transferred to [facility], found to have septic shock and MRSA bacteremia, trx to MICU s/p PEA arrest, intubated and now extubated    -Given MRSA bacteremia, TRUMAN performed demonstrating MV endocarditis - discussed with CTS Dr. Pierce - pt. not a surgical candidate at this time   -s/p abx, repeat BCx neg  -CTH head noted - pt. with cva  -Right thigh hematoma noted on CT scan - appreciate vascular evaluation - no surgical intervention required by vascular at this time  -Follow up palliative care, pt is DNR  -Plan for comfort care now pending inpatient hospice  -Supportive care per primary team  -No further inpatient cardiac w/u planned at this time    Kalie Lau MD

## 2021-08-09 NOTE — PROGRESS NOTE ADULT - PROBLEM SELECTOR PLAN 6
Patient normotensive at this time, holding meds in this setting

## 2021-08-09 NOTE — PROGRESS NOTE ADULT - PROBLEM SELECTOR PROBLEM 6
Hypertension, unspecified type

## 2021-08-09 NOTE — PROGRESS NOTE ADULT - REASON FOR ADMISSION
Chest pain and GIB

## 2021-08-09 NOTE — PROGRESS NOTE ADULT - NSICDXPILOT_GEN_ALL_CORE
[place]on[place]oro
[place]row[place]
Delmita
[place]
[employer]
[place]
G[place]well
[place]
Lady [place]
M[place]av[place]
P[place]
Seb[place]
Skand[place]
Sullivan
Thomps[place]
Vina
Avondale
Barbeau
Birmingham
Chillic[place]
Denbo
Elysburg
En[place]
Franklin
[place]
H[place]
Jol[place]
M[place]
Ogles[place]
Oneida
Pemberton
Rich[place]
Rosedale
Stanley
Steger
Turin
Wac[place]
War[place]
Adrian
Austin
Banner
Brad[place]
Brimhall
Bristow
Calvin
Central [place]
Chandler
Colrain
Corona
Dallas
De Witt
Decatur
Galena
Great Falls
Greendale
Greenwald
Hartford
Henderson
Indianola
[place]
Kingston
Leesburg
Melbourne [place]
Milnesand
Min[place]
Min[place] Lisbon
Or[place]
Pampa
Philadelphia
Pine Bl[place]
Plessis
Ronceverte
Sabine
Sagamore
Saint [place]
Sheridan Lake
South [place]
Spokane
Twin Lake
Ullin
Vandal[place]
Webbers Falls
Wilkesville
Winifred
Zalma
Anderson [place]
Ashland
Chromo
East And[place] Tawas
Eaton
Elmore
Galax
Hatche[place]ubbee
Hereford
Houston
Institute
Irvine
Josephine
Lancaster
Lauderdale
Lickingville
Mammoth Spring
Maxwell
Meservey
Meyersville
Montgomery
Mount Lookout
Natural Bridge
[place]
Newcomer[place]
Olivehill
Ontario
Penn Valley
Phillipsburg
Pilot Station
Plummer
Port Heiden
Redwood City
Rio Grande
Roxana
Santa Fe Springs
Scotland
Stilwell
Waterflow
Willernie
Auburn
Milford
Fishtail
Winter Haven
Maxie
North
Scotts Valley
Dry Fork
Underwood

## 2021-08-09 NOTE — DISCHARGE NOTE NURSING/CASE MANAGEMENT/SOCIAL WORK - NSDCVIVACCINE_GEN_ALL_CORE_FT
pneumococcal polysaccharide PPV23; 30-Oct-2014 16:41; Miladys Hernandes (RN); [employer] [id number]; IntraMuscular; Deltoid Left.; 0.5 milliLiter(s);

## 2021-08-09 NOTE — PROGRESS NOTE ADULT - ASSESSMENT
70M with PMHx of CAD s/p CABG (complicated by pleural effusion requiring chest tubes), ESRD on HD (TTS), DMII, HTN, afib on coumadin, and anemia transferred from [facility] for evaluation of chest pain after HD and GIB s/p 2U PRBC transfusion and aflutter/afib with RVR managed with metoprolol. Admitted to MICU for hypotension dependent on pressors likely 2/2 to sepsis. In MICU, patient had a cardiac arrest on 7/13 requiring CPR, 3x epi, and 2x shock (for VTach). Course complicated by recent drop in Hgb requiring pRBCs. CT of leg shows hematoma on right adductor. Patient lethargic, suggesting compromised mental status. AMS likely 2/2 R frontal CVA and anoxic brain injury in the setting of cardiac arrest. Patient transferred from MICU to floor. Blood cultures now growing Serratia. After discussion with son (HCP), decision is now to focus on comfort care. Pt pending transfer to inpatient hospice at [facility].

## 2021-08-09 NOTE — PROGRESS NOTE ADULT - PROBLEM SELECTOR PROBLEM 5
Atrial fibrillation, unspecified type

## 2021-08-09 NOTE — PROGRESS NOTE ADULT - PROBLEM/PLAN-7
08/15/19 2200   C-SSRS (Frequent Screen)   2. Have you actually had any thoughts of killing yourself? No   6. Have you done anything, started to do anything, or prepared to do anything to end your life? No   Suicide Evaluation Negative screen= no ideation, behaviors or history     Negative screen. Maintain current plan of care.    
DISPLAY PLAN FREE TEXT

## 2021-08-09 NOTE — PROGRESS NOTE ADULT - PROBLEM SELECTOR PROBLEM 4
Cerebrovascular accident (CVA), unspecified mechanism
Palliative care encounter
Cerebrovascular accident (CVA), unspecified mechanism
Cerebrovascular accident (CVA), unspecified mechanism
Palliative care encounter
Cerebrovascular accident (CVA), unspecified mechanism

## 2021-08-09 NOTE — HOSPICE CARE NOTE - CONVESATION DETAILS
A bed is available at [facility] today and signed consents have been received. Pt is scheduled for a 6PM . Hospice is ready to deliver care
No bed available at [facility] today, call again today to son Randal, message left, no back received. HCN Consents sent to ROMAINE Allison to give to patient's spouse at the bedside, per Allison spouse was given the consents and told HCN RN awaiting call to review, spouse will give to son who will be visiting in the evening.     HCN RN will continue to follow.    NII Enamorado RN 
RN called son Randal via telephone- Explained hospice POC, home hospice services, short term nature of in-pt hospice care for symptom management with understanding.   Randal would like to speak to family and research in-pt hospice centers before making a decision.   
Several attempts made to contact son Randal today to sign consents, no call back received. No bed available today at [facility]. ROMAINE Cooper made aware.     NII Enamorado RN

## 2021-08-18 NOTE — PROGRESS NOTE ADULT - PROVIDER SPECIALTY LIST ADULT
Detail Level: Simple Additional Notes: Patient consent was obtained to proceed with the visit and recommended plan of care after discussion of all risks and benefits, including the risks of COVID-19 exposure. Infectious Disease

## 2021-09-15 NOTE — PHYSICAL THERAPY INITIAL EVALUATION ADULT - GAIT TRAINING, PT EVAL
GOAL: Pt will ambulate 150 feet with least restrictive device as appropriate independently in 2 weeks step to step

## 2021-09-27 ENCOUNTER — APPOINTMENT (OUTPATIENT)
Dept: VASCULAR SURGERY | Facility: CLINIC | Age: 71
End: 2021-09-27

## 2021-10-07 NOTE — DIETITIAN INITIAL EVALUATION ADULT. - PROBLEM SELECTOR PLAN 7
ATTENDING NOTE:    I reviewed the patient with the resident, reviewed their findings, assessment, and plan, and agree with the content of their clinical note for today.    HISTORY OF PRESENT ILLNESS:  Patient presented for Pre-Op Exam    Had been following with Dr Taylor, but he is uncertain who he would like his PCP to be moving forward. Patient states that he is in his usual state of health with no complaints. Blood pressure is elevated without a prior history of hypertension.     See resident note for further details.    ASSESSMENT/PLAN:  Elevated blood pressure: today's readings consistent with diagnosis of hypertension. Asymptomatic.  -Follow-up 2-4 weeks     Preop exam: blood pressure elevation is within limits for the proposed minimal/low risk procedure at this time.  -Clear to proceed as planned  -Needs to initiate antihypertensive therapy and confirm PCP    DIAGNOSIS:  1. Preop examination    2. Cataract of both eyes, unspecified cataract type    3. Hypertension, unspecified type    4. History of cardiac arrhythmia    5. Tachycardia    6. Left anterior fascicular block      Follow-up: PCP in 2-4 weeks for follow-up hypertension    Andreina Breaux MD, MPH  Family Physician with Obstetrics  [facility]  10/07/21     Addendum 10/11/2021:  -Attempted to call patient on 10/8 to discuss importance of controlling hypertension and to start an antihypertensive; at appointment, patient was uncertain who his PCP would be moving forward  -Called again today to discuss; this time left a voicemail for call-back  -Will attempt to reach out again to ensure correct treatment with appropriate follow-up plan are in place    Andreina Breaux MD, MPH  Family Physician with Obstetrics  [facility]  10/11/21        C/w atorvastatin 40 mg

## 2021-11-09 NOTE — PROGRESS NOTE ADULT - SUBJECTIVE AND OBJECTIVE BOX
Date of Service: 07-18-21 @ 13:51    Patient is a 70y old  Male who presents with a chief complaint of Chest pain and GIB (18 Jul 2021 13:32)      Any change in ROS: intubated and off sedation     MEDICATIONS  (STANDING):  aspirin  chewable 81 milliGRAM(s) Oral daily  atorvastatin 40 milliGRAM(s) Oral at bedtime  chlorhexidine 0.12% Liquid 15 milliLiter(s) Oral Mucosa every 12 hours  chlorhexidine 2% Cloths 1 Application(s) Topical daily  dextrose 40% Gel 15 Gram(s) Oral once  dextrose 40% Gel 15 Gram(s) Oral once  dextrose 5%. 1000 milliLiter(s) (50 mL/Hr) IV Continuous <Continuous>  dextrose 5%. 1000 milliLiter(s) (100 mL/Hr) IV Continuous <Continuous>  dextrose 50% Injectable 25 Gram(s) IV Push once  dextrose 50% Injectable 12.5 Gram(s) IV Push once  dextrose 50% Injectable 25 Gram(s) IV Push once  diltiazem    Tablet 30 milliGRAM(s) Oral four times a day  epoetin danilo-epbx (RETACRIT) Injectable 50239 Unit(s) IV Push <User Schedule>  glucagon  Injectable 1 milliGRAM(s) IntraMuscular once  heparin  Infusion. 700 Unit(s)/Hr (7 mL/Hr) IV Continuous <Continuous>  insulin lispro (ADMELOG) corrective regimen sliding scale   SubCutaneous every 6 hours  norepinephrine Infusion 0.05 MICROgram(s)/kG/Min (4.04 mL/Hr) IV Continuous <Continuous>  pantoprazole  Injectable 40 milliGRAM(s) IV Push every 12 hours    MEDICATIONS  (PRN):  acetaminophen   Tablet .. 1000 milliGRAM(s) Oral every 6 hours PRN Temp greater or equal to 38C (100.4F), Mild Pain (1 - 3), Moderate Pain (4 - 6)  calamine/zinc oxide Lotion 1 Application(s) Topical three times a day PRN Itching  heparin   Injectable 7000 Unit(s) IV Push every 6 hours PRN For aPTT less than 40  heparin   Injectable 3500 Unit(s) IV Push every 6 hours PRN For aPTT between 40 - 57    Vital Signs Last 24 Hrs  T(C): 37.2 (18 Jul 2021 12:00), Max: 37.8 (18 Jul 2021 04:00)  T(F): 98.9 (18 Jul 2021 12:00), Max: 100 (18 Jul 2021 04:00)  HR: 71 (18 Jul 2021 12:00) (56 - 83)  BP: 124/48 (18 Jul 2021 12:00) (107/48 - 154/55)  BP(mean): 67 (18 Jul 2021 12:00) (54 - 82)  RR: 15 (18 Jul 2021 12:00) (14 - 27)  SpO2: 100% (18 Jul 2021 12:00) (95% - 100%)  Mode: AC/ CMV (Assist Control/ Continuous Mandatory Ventilation)  RR (machine): 14  TV (machine): 400  FiO2: 40  PEEP: 5  ITime: 0.77  MAP: 11  PIP: 25    I&O's Summary    17 Jul 2021 07:01  -  18 Jul 2021 07:00  --------------------------------------------------------  IN: 1372.8 mL / OUT: 1000 mL / NET: 372.8 mL    18 Jul 2021 07:01  -  18 Jul 2021 13:51  --------------------------------------------------------  IN: 294.4 mL / OUT: 0 mL / NET: 294.4 mL          Physical Exam:   GENERAL: NAD, well-groomed, well-developed  HEENT: KATHY/   Atraumatic, Normocephalic  ENMT: No tonsillar erythema, exudates, or enlargement; Moist mucous membranes, Good dentition, No lesions  NECK: Supple, No JVD, Normal thyroid  CHEST/LUNG: Clear to auscultaion  CVS: Regular rate and rhythm; No murmurs, rubs, or gallops  GI: : Soft, Nontender, Nondistended; Bowel sounds present  NERVOUS SYSTEM: Intuabted and sedated  EXTREMITIES:  Mild edema  LYMPH: No lymphadenopathy noted  SKIN: No rashes or lesions  ENDOCRINOLOGY: No Thyromegaly  PSYCH: Lethargic    Labs:                              7.9    12.24 )-----------( 114      ( 18 Jul 2021 03:44 )             27.2                         9.0    13.73 )-----------( 163      ( 17 Jul 2021 06:27 )             31.1                         7.9    8.80  )-----------( 101      ( 16 Jul 2021 05:51 )             26.5                         8.2    10.98 )-----------( 162      ( 15 Jul 2021 04:24 )             26.9     07-18    135  |  96<L>  |  26<H>  ----------------------------<  195<H>  3.7   |  26  |  4.43<H>  07-17    133<L>  |  94<L>  |  38<H>  ----------------------------<  213<H>  4.3   |  22  |  6.22<H>  07-16    133<L>  |  96<L>  |  23  ----------------------------<  128<H>  3.9   |  24  |  4.99<H>  07-15    135  |  96<L>  |  28<H>  ----------------------------<  173<H>  3.3<L>   |  20<L>  |  6.40<H>    Ca    8.4      18 Jul 2021 03:44  Ca    8.8      17 Jul 2021 06:27  Phos  3.0     07-18  Mg     1.90     07-18    TPro  5.3<L>  /  Alb  2.5<L>  /  TBili  0.4  /  DBili  x   /  AST  21  /  ALT  <5  /  AlkPhos  105  07-18  TPro  5.8<L>  /  Alb  2.7<L>  /  TBili  0.5  /  DBili  x   /  AST  25  /  ALT  6   /  AlkPhos  110  07-17  TPro  5.0<L>  /  Alb  2.3<L>  /  TBili  0.4  /  DBili  x   /  AST  23  /  ALT  <5<L>  /  AlkPhos  89  07-16  TPro  4.6<L>  /  Alb  2.1<L>  /  TBili  0.4  /  DBili  x   /  AST  17  /  ALT  <5<L>  /  AlkPhos  89  07-15    CAPILLARY BLOOD GLUCOSE      POCT Blood Glucose.: 259 mg/dL (18 Jul 2021 11:34)  POCT Blood Glucose.: 220 mg/dL (18 Jul 2021 05:38)  POCT Blood Glucose.: 235 mg/dL (17 Jul 2021 23:40)  POCT Blood Glucose.: 183 mg/dL (17 Jul 2021 17:47)      LIVER FUNCTIONS - ( 18 Jul 2021 03:44 )  Alb: 2.5 g/dL / Pro: 5.3 g/dL / ALK PHOS: 105 U/L / ALT: <5 U/L / AST: 21 U/L / GGT: x           PTT - ( 18 Jul 2021 10:32 )  PTT:49.2 sec          RECENT CULTURES:  07-12 @ 00:30 .Blood Blood     r< from: CT Abdomen and Pelvis w/ IV Cont (07.13.21 @ 18:37) >  ADRENALS: Within normal limits.  KIDNEYS/URETERS: Atrophic bilateral kidneys. No hydronephrosis. Exophytic 1.3 cm cyst arising from the left kidney.    BLADDER: Decompressed.  REPRODUCTIVE ORGANS: Prostate within normal limits. Bilateral hydroceles.    BOWEL: No bowel obstruction. Appendix is normal. Enteric tube is present with the tip in stomach.  PERITONEUM: Mild to moderate ascites in the perihepatic and perisplenic regions, tracking along the paracolic gutters. Trace ascites in the pelvis.  VESSELS: Atherosclerotic changes.  RETROPERITONEUM/LYMPH NODES: No lymphadenopathy.  ABDOMINAL WALL: Diffuse subcutaneous edema.  BONES: Degenerative changes.    IMPRESSION:  *  Mild pulmonary edema with bilateral partially loculated pleural effusions with adjacent passive atelectasis.  *  Debris within the right mainstem with associated partial atelectasis of the right lower lobe.  *  Mild to moderate ascites.    --- End of Report ---    < end of copied text >  < from: CT Chest w/ IV Cont (07.13.21 @ 18:36) >  BLADDER: Decompressed.  REPRODUCTIVE ORGANS: Prostate within normal limits. Bilateral hydroceles.    BOWEL: No bowel obstruction. Appendix is normal. Enteric tube is present with the tip in stomach.  PERITONEUM: Mild to moderate ascites in the perihepatic and perisplenic regions, tracking along the paracolic gutters. Trace ascites in the pelvis.  VESSELS: Atherosclerotic changes.  RETROPERITONEUM/LYMPH NODES: No lymphadenopathy.  ABDOMINAL WALL: Diffuse subcutaneous edema.  BONES: Degenerative changes.    IMPRESSION:  *  Mild pulmonary edema with bilateral partially loculated pleural effusions with adjacent passive atelectasis.  *  Debris within the right mainstem with associated partial atelectasis of the right lower lobe.  *  Mild to moderate ascites.    --- End of Report ---            TRU WEBER MD; Resident Radiology  This document has been electronically signed.  MIKE NOLAN MD; Attending Radiologist  This document has been electronically signed. Jul 14 2021  9:42AM    < end of copied text >             No Growth Final    07-11 @ 21:09 .Blood Blood-Peripheral                No Growth Final          RESPIRATORY CULTURES:          Studies  Chest X-RAY  CT SCAN Chest   Venous Dopplers: LE:   CT Abdomen  Others               DISPLAY PLAN FREE TEXT

## 2021-12-18 NOTE — PROGRESS NOTE ADULT - SUBJECTIVE AND OBJECTIVE BOX
Patient seen at HD, Fab CP, SOB Review of systems otherwise (-)    acetaminophen   Tablet .. 650 milliGRAM(s) Oral every 6 hours PRN  albuterol/ipratropium for Nebulization 3 milliLiter(s) Nebulizer every 6 hours  aMIOdarone    Tablet 200 milliGRAM(s) Oral daily  aspirin enteric coated 81 milliGRAM(s) Oral daily  calcium acetate 667 milliGRAM(s) Oral three times a day with meals  dextrose 40% Gel 15 Gram(s) Oral once PRN  dextrose 5%. 1000 milliLiter(s) IV Continuous <Continuous>  dextrose 50% Injectable 12.5 Gram(s) IV Push once  dextrose 50% Injectable 25 Gram(s) IV Push once  dextrose 50% Injectable 25 Gram(s) IV Push once  fluticasone propionate 50 MICROgram(s)/spray Nasal Spray 1 Spray(s) Both Nostrils two times a day  glucagon  Injectable 1 milliGRAM(s) IntraMuscular once PRN  guaiFENesin   Syrup  (Sugar-Free) 100 milliGRAM(s) Oral every 6 hours PRN  heparin  Infusion.  Unit(s)/Hr IV Continuous <Continuous>  heparin  Injectable 7000 Unit(s) IV Push every 6 hours PRN  heparin  Injectable 3500 Unit(s) IV Push every 6 hours PRN  insulin glargine Injectable (LANTUS) 14 Unit(s) SubCutaneous at bedtime  insulin lispro (HumaLOG) corrective regimen sliding scale   SubCutaneous three times a day before meals  insulin lispro (HumaLOG) corrective regimen sliding scale   SubCutaneous at bedtime  insulin lispro Injectable (HumaLOG) 5 Unit(s) SubCutaneous three times a day before meals  ketotifen 0.025% Ophthalmic Solution 1 Drop(s) Both EYES daily  methimazole 5 milliGRAM(s) Oral daily  metoprolol succinate  milliGRAM(s) Oral daily  pantoprazole    Tablet 40 milliGRAM(s) Oral before breakfast  simvastatin 40 milliGRAM(s) Oral at bedtime  sodium chloride 0.65% Nasal 1 Spray(s) Both Nostrils two times a day                            13.0   8.47  )-----------( 118      ( 23 Jan 2020 06:06 )             44.3       Hemoglobin: 13.0 g/dL (01-23 @ 06:06)  Hemoglobin: 12.8 g/dL (01-22 @ 06:39)  Hemoglobin: 13.2 g/dL (01-21 @ 07:28)  Hemoglobin: 14.3 g/dL (01-20 @ 16:17)      01-23    134<L>  |  92<L>  |  33<H>  ----------------------------<  227<H>  4.4   |  27  |  5.66<H>    Ca    8.7      23 Jan 2020 06:06  Phos  5.3     01-22      Creatinine Trend: 5.66<--, 6.74<--, 9.07<--, 8.36<--    COAGS: PT/INR - ( 23 Jan 2020 06:06 )   PT: 16.8 sec;   INR: 1.44 ratio         PTT - ( 23 Jan 2020 13:29 )  PTT:31.5 sec          T(C): 36.8 (01-23-20 @ 13:05), Max: 37.3 (01-23-20 @ 00:14)  HR: 86 (01-23-20 @ 13:05) (77 - 99)  BP: 114/71 (01-23-20 @ 13:05) (104/58 - 138/73)  RR: 17 (01-23-20 @ 13:05) (17 - 20)  SpO2: 96% (01-23-20 @ 13:05) (94% - 99%)  Wt(kg): --    I&O's Summary    22 Jan 2020 07:01  -  23 Jan 2020 07:00  --------------------------------------------------------  IN: 0 mL / OUT: 2500 mL / NET: -2500 mL    23 Jan 2020 07:01  -  23 Jan 2020 17:22  --------------------------------------------------------  IN: 480 mL / OUT: 0 mL / NET: 480 mL    Gen: Appears well in NAD  HEENT:  (-)icterus (-)pallor  CV: N S1 S2 1/6 DEJAH (+)2 Pulses B/l  Resp:  Clear to ausculatation B/L, normal effort  GI: (+) BS Soft, NT, ND  Lymph:  (-)Edema, (-)obvious lymphadenopathy  Skin: Warm to touch, Normal turgor  Psych: Appropriate mood and affect        ASSESSMENT/PLAN: 70 y/o male well known to our office (Cardiologist - Dr. Méndez) with CAD s/p multiple stents and recent CABG in 9/2019 with course c/b Afib on coumadin, HTN, HLD, DM2, and ESRD on HD presents with lethargy, cough/SOB, LE edema and reported syncopal episode 1 week ago found to be volume overloaded with +parainfluenza and episode of syncope one week ago.     -Volume status and dyspnea improved  -TTE with normal LV function but with RVE with decreased RV function,   I reviewed the echo, RV size is borderline enlarged but likely to volume status and loading conditions.  Doubt he truly has stage III diastolic dysfunction as his LA size is normal.  Plan to repeat echo in office once euvolemic and  acute URI has resolved.  -Admission CT negative for PE - Pulm Eval noted  -Pt. INR subtherapeutic however complains of epistaxis and therefore bridging on hold  -Follow up ENT  -Recommend lifelong ac if no contraindications  -CTS eval for appreciated    Murphy Colin MD, [facility]  BEEPER [phone] ACP

## 2022-04-12 NOTE — PATIENT PROFILE ADULT - HEALTH LITERACY
Patient Education:   Diagnosis: BLadder  Regimen: GEMCITABINE 27 MG/M2 TWICE WEEKLY + RT X 4 WEEKS INDUCTION FOLLOWED BY 2.5 WEEKS CONSOLIDATION      Who is present for teaching: patient, children and daughter    Performance Status (ECOG):   ECOG   ECOG Performance Status         Patient received general instructions regarding infusion room restrictions including cell phones utilization and presence of children.    Missed appointment Policy Reviewed, and written instructions provided to patient.    A treatment calendar was provided: Yes    Patient confirms receiving written information on diagnosis.    Patient verbalizes understanding of treatment plan. Yes    Is patient receiving oral chemotherapy? No    Ongoing adherence was discussed including time, dose, and route of chemotherapy    Chemotherapy safety and precautions discussed, including home safety    Discussed steps to take if a chemotherapy dose was missed    Reviewed toxicities including those that may cause changes to plan or immediate discontinuation of therapy: nausea and vomiting, diarrhea and constipation, mucositis, hair loss, fluids and dehydration, appetite and taste changes, sexuality, neuropathy, mood changes, skin and nail changes, myelosuppression, sensitivity reactions; symptoms and management, fatigue, muscle, joint, bone pain, fever, antiemetics, tearing of eyes, bruising or bleeding, fluid retention, drug specific infusion reactions and increased sensitivity to sun  Discussed access devices: Port    New prescriptions were given and reviewed. (See Medications)  Current medications were reviewed and verified. (See Medications)  Other Teaching: infusion room procedure    Patient Teaching Methods: Body, Mind, Spirit booklet Booklet form, Missed Appointment Policy, [employer] Drug specific education sheets, General Guidelines For When to Call your Oncologist (includes phone numbers for contacting MD/RN during clinic hours and MD post clinic  hours), [facility] First magnet and (oral chemo): How to Dispose of Oral Chemo Drugs Safely    Barriers to Learning: None  Barriers to Adherence: None  Learning Style: written, verbal, demonstration  Learner Outcomes:  Patient and/or caregiver verbalize understanding of information given    Next appointment scheduled: 4/15/2022   Patient instructed to call the office with any questions or concerns.    Diagnosis: Bladder  Regimen: GEMCITABINE 27 MG/M2 TWICE WEEKLY + RT X 4 WEEKS INDUCTION FOLLOWED BY 2.5 WEEKS CONSOLIDATION      Cycle/Day: D1C1  Is this a C1D1 appt?  Yes, the original teaching materials were provided in person at clinic appointment    Dr. Anthony is supervising clinician today.    ECOG:   ECOG   ECOG Performance Status        Review and verified Advanced Directives: Yes; verified forms in EMR    Verified if patient has state DNR bracelet on: No; Full Code    Nursing Assessment:   A focused nursing assessment addressing the toxicity of chemotherapy was performed and the patient reports the following:    Anxiety/Depression/Insomnia: Anxiety: No, Depression: No and Insomnia: Yes: Intervention no intervention  Pain: NO      Toxicity Assessment    Nursing Assessment: A focused nursing assessment  was performed and the patient reports the following: Nausea: NO  Vomiting: NO  Fever: NO  Chills: NO  Other signs of infection: NO  Bleeding: YES, bruising, takes Aspirin  Mucositis: NO  Diarrhea: NO  Constipation: NO  Anorexia: NO  Dysuria: NO, emptying   Urinary Bleeding: NO  Cough: NO  Shortness of Breath: NO  Fatigue/Weakness: YES, patient aware of limits   Numbness/Tingling: NO  Other Neuropathies: NO  Edema: NO  Rash: NO  Hand/Foot Syndrome: NO  Anxiety/Depression/Insomnia: YES, difficulties with sleep  Pain: NO    Pre-Treatment: - Treatment consent signed  - Patient has valid pre-authorization  - VS completed  - Height and weight verified  - Premed orders are verified prior to administration  - Treatment  parameters verified in patient protocol  - Chemotherapy doses independently doubled checked & verified by two practitioners  - Patient is identified by first & last name, Date of birth that has been verified with the patient chairside.    Treatment: Refer to [facility] and MAR for line assessment and medication administration, Blood return confirmed before, during and after treatment administered and Infusion pump used for non-vesicant drugs    Post Treatment: Treatment tolerated well; no adverse reaction    Education: No new instructions needed    Next appointment scheduled: 4/15/2022   Patient instructed to call the office with any questions or concerns.    Patient Discharged: patient discharged to home per self, ambulatory, with family member        Patient confirms receipt of a signed copy of Anti-Cancer Treatment consent and verbalizes understanding of treatment plan: Yes.     Pre-Treatment: Treatment consent signed  Patient has valid pre-authorization  VS completed  Height and weight verified  BSA independently double checked & verified by two practitioners  Premed orders, including hydration, are verified prior to administration  Treatment parameters verified in patient protocol  Chemotherapy doses independently doubled checked & verified by two practitioners    Treatment: I have reviewed the following with the patient:  Name of chemo drug, duration and route of infusion, and reportable infusion-related symptoms.  Chemotherapy has not ; double checked & verified by two practitioners  Appearance and physical integrity of drugs meets standard of drug monograph; double checked & verified by two practitioners  Rate set on infusion pump is in alignment with ordered rate; double checked & verified by two practitioners  Blood return confirmed before, during and after treatment administered  Infusion pump used for non-vesicant drugs  Drugs were administered in proper sequencing  Refer to [facility] and MAR for line  assessment and medication administration    Post Treatment: Treatment tolerated well; no adverse reaction    Does the Patient have a central line?  yes:   Device: Port  Central Line Site: Left and Chest  Central Line Site Appearance: clear  Is this a port? Yes: Implanted port accessed using a 20 gauge non-coring needle primed with 0.9% sodium chloride. Good blood return obtained.  Blood obtained and sent to lab.  Site Care: Aseptic site care per protocol, Sterile gauze and tape dressing applied and Injection caps changed/applied  Central line flushed with: 10 ml 0.9 normal saline and 100 un/ml- 500 units heparin  Central line removed: no  Gilmore Needle: Gilmore needle de-accessed        Transfusion: Not needed    Integrative Medicine: No    Oral Chemotherapy: No    Education: No new instructions needed    Next appointment scheduled: 4/15/2022  Patient instructed to call the office with any questions or concerns.    Patient Discharged: patient discharged to home per self, ambulatory, with family member  Fall risk NO fall risk  ([place] Fall Risk)    Distress Tool   NA  Administrations This Visit     gemCITAbine (GEMZAR) 49 mg in sodium chloride 0.9 % 100 mL chemo infusion     Admin Date  04/12/2022 Action  New Bag Dose  49 mg Rate  222.6 mL/hr Route  Intravenous Administered By  Rhonda Singh RN          heparin 100 UNIT/ML lock flush 500 Units     Admin Date  04/12/2022 Action  Given Dose  500 Units Route  Intracatheter Administered By  Rhonda Singh RN          ondansetron (ZOFRAN) injection 8 mg     Admin Date  04/12/2022 Action  Given Dose  8 mg Route  Intravenous Administered By  Rhonda Singh RN          sodium chloride 0.9 % flush bag 250 mL     Admin Date  04/12/2022 Action  New Bag Dose  250 mL Rate  25 mL/hr Route  Intravenous Administered By  Rhonda Singh RN                 no

## 2022-05-26 NOTE — PROVIDER CONTACT NOTE (CRITICAL VALUE NOTIFICATION) - ASSESSMENT
157.48
Patient is A&O X1 at times, confused and lethargic - on comfort care meaures MOLST in chart. No acute distress noted, on 4L NC, sitting comfortably in bed.

## 2022-09-20 NOTE — PROGRESS NOTE ADULT - PROBLEM SELECTOR PLAN 8
18 -not in exacerbation   -cont ASA, statin and BB -not in exacerbation   -cont ASA, statin and BB  -f/u ECHO.  -Cardiology dr. Colin following

## 2022-10-28 NOTE — PROGRESS NOTE ADULT - ATTENDING COMMENTS
You were seen in the ED for premature ventricular contractions  Return to the ED for any worsening symptoms or new symptoms  Follow up with your primary care doctor as soon as possible  PATIENT IS SEEN AND EXAMINED    -  CELLULITIS OF LEFT LEG, DIABETIC LEFT FOOT ULCER  ON IV. UNASYN, AND PO DOXYCYCLINE. BLOOD  CXS NGTD.  IF PATIENT DOES NOT IMPROVE , WILL CONSIDER SWITCHING HIM TO IV. ZOSYN AND VANCOMYCIN. ID F/UP BY DR. DUEÑAS TO F/ UP   -  ESRD ON HD  -  PAD, DIABETIC PERIPHERAL NEUROPATHY  -  GI AND DVT PROPHYLAXIS  -  DR. ASHA DIAZ

## 2022-12-08 NOTE — PROGRESS NOTE ADULT - PROBLEM SELECTOR PLAN 4
Problem: Falls - Risk of  Goal: *Absence of Falls  Description: Document [place] Fall Risk and appropriate interventions in the flowsheet. Outcome: Progressing Towards Goal  Note: Fall Risk Interventions:       Mentation Interventions: Bed/chair exit alarm, Door open when patient unattended    Medication Interventions: Bed/chair exit alarm    Elimination Interventions: Bed/chair exit alarm              Problem: Patient Education: Go to Patient Education Activity  Goal: Patient/Family Education  Outcome: Progressing Towards Goal     Problem: Pressure Injury - Risk of  Goal: *Prevention of pressure injury  Description: Document Adolph Scale and appropriate interventions in the flowsheet.   Outcome: Progressing Towards Goal  Note: Pressure Injury Interventions:  Sensory Interventions: Assess changes in LOC    Moisture Interventions: Absorbent underpads    Activity Interventions: Pressure redistribution bed/mattress(bed type)    Mobility Interventions: Float heels, HOB 30 degrees or less, Pressure redistribution bed/mattress (bed type)    Nutrition Interventions: Document food/fluid/supplement intake    Friction and Shear Interventions: Apply protective barrier, creams and emollients                Problem: Patient Education: Go to Patient Education Activity  Goal: Patient/Family Education  Outcome: Progressing Towards Goal Pt with syncopal episode one week prior to admission as he states as reported, wife who witnessed fall states no head strike.  MRI brain consistent with chronic infarcts.   Monitor on tele

## 2023-10-16 NOTE — DIETITIAN INITIAL EVALUATION ADULT. - PROBLEM SELECTOR PROBLEM 1
Coronary artery disease involving coronary bypass graft of native heart with unstable angina pectoris
RN

## 2023-11-08 NOTE — H&P ADULT - PROBLEM SELECTOR PLAN 6
History  Chief Complaint   Patient presents with    Medical Problem     Sudden onset R hand numbness at 1705 tonight while drinking a cup of coffee. Lasted for minutes until "tongue became numb" for minutes, then LUE for minutes. Pt reports she believes these are migraine sx's, although not her usual ones     HPI      This is a very pleasant, nontoxic-appearing, 45-year-old female presents emergency department with onset of right hand numbness while drinking a couple coffee. Patient did not drop the cup of coffee, it was noted to be very warm and occurred at approximately 5:05 PM.  Lasted for a few minutes then her face became numb her lips became numb and then her upper extremity became numb, no motor weakness. Patient does have a longstanding history having headaches, patient took her migraine medications and her symptoms resolved. Reports being under a large amount of anxiety recently,   within the last 2 calendar months as per the father who is at the bedside and the patient is having significant mount of financial difficulties. Prior to Admission Medications   Prescriptions Last Dose Informant Patient Reported? Taking?    ARIPiprazole (ABILIFY) 15 mg tablet   Yes No   Sig: Take 15 mg by mouth daily   ARIPiprazole (ABILIFY) 5 mg tablet   Yes No   Sig: Take 5 mg by mouth daily   ARIPiprazole (ABILIFY) 5 mg tablet   Yes No   Sig: Take 5 mg by mouth daily   BD Pen Needle Xuan 2nd Gen 32G X 4 MM MISC   Yes No   Sig: USE DAILY WITH VICTOZA   Lidocaine Viscous HCl (XYLOCAINE) 2 % mucosal solution   No No   Sig: USE AS DIRECTED   atorvastatin (LIPITOR) 20 mg tablet   Yes No   Sig: take 1 tablet by mouth once daily   Patient not taking: Reported on 10/10/2023   atorvastatin (LIPITOR) 20 mg tablet   Yes No   Sig: Take 20 mg by mouth daily   Patient not taking: Reported on 2023   citalopram (CeleXA) 20 mg tablet   Yes No   Sig: Take by mouth   Patient not taking: Reported on 2023   citalopram (CeleXA) 40 mg tablet   Yes No   Sig: Take 40 mg by mouth daily   citalopram (CeleXA) 40 mg tablet   Yes No   Sig: Take 40 mg by mouth daily   Patient not taking: Reported on 6/14/2023   clonazePAM (KlonoPIN) 0.5 mg tablet   Yes No   Sig: Take by mouth   Patient not taking: Reported on 6/14/2023   clonazePAM (KlonoPIN) 1 mg tablet   Yes No   Sig: Take 1/2 tab PO PRN daily and 1 tab PO QHS. Patient not taking: Reported on 6/14/2023   clonazePAM (KlonoPIN) 1 mg tablet   Yes No   Sig: take 1/2 tablet by mouth once daily if needed AND 1 TABLET AT BEDTIME.    Patient not taking: Reported on 6/14/2023   ibuprofen (MOTRIN) 600 mg tablet   No No   Sig: Take 1 tablet (600 mg total) by mouth every 6 (six) hours as needed for mild pain   Patient not taking: Reported on 6/14/2023   levothyroxine 50 mcg tablet   Yes No   Sig: Take 50 mcg by mouth daily   Patient not taking: Reported on 6/14/2023   levothyroxine 50 mcg tablet   Yes No   Sig: Take 50 mcg by mouth daily   Patient not taking: Reported on 6/14/2023   liraglutide (Victoza) injection   Yes No   Sig: Inject 1.8 mg under the skin daily   Patient not taking: Reported on 6/14/2023   meloxicam (MOBIC) 15 mg tablet   Yes No   Sig: take 1 tablet by mouth once daily WITH FOOD   Patient not taking: Reported on 6/14/2023   meloxicam (MOBIC) 15 mg tablet   Yes No   Sig: Take 15 mg by mouth daily take with food   Patient not taking: Reported on 6/14/2023   metFORMIN (GLUCOPHAGE) 1000 MG tablet   Yes No   Sig: Take 1,000 mg by mouth 2 (two) times a day with meals   pantoprazole (PROTONIX) 40 mg tablet   Yes No   Sig: Take 40 mg by mouth 2 (two) times a day   Patient not taking: Reported on 6/14/2023   pantoprazole (PROTONIX) 40 mg tablet   Yes No   Sig: Take 40 mg by mouth 2 (two) times a day   Patient not taking: Reported on 6/14/2023   traZODone (DESYREL) 100 mg tablet   Yes No   Sig: Take 100 mg by mouth daily at bedtime as needed      Facility-Administered Medications: None Past Medical History:   Diagnosis Date    Anxiety     COPD (chronic obstructive pulmonary disease) ([facility])     Depression     Diabetes mellitus ([facility])     Gastroparesis     Otitis media        Past Surgical History:   Procedure Laterality Date    APPENDECTOMY      CHOLECYSTECTOMY         No family history on file. I have reviewed and agree with the history as documented. E-Cigarette/Vaping     E-Cigarette/Vaping Substances     Social History     Tobacco Use    Smoking status: Every Day     Packs/day: 1.00     Types: Cigarettes    Smokeless tobacco: Never   Substance Use Topics    Alcohol use: Yes    Drug use: Never       Review of Systems   Constitutional: Negative. Negative for chills, diaphoresis and fever. HENT: Negative. Eyes: Negative. Respiratory: Negative. Negative for chest tightness and shortness of breath. Cardiovascular: Negative. Negative for chest pain. Gastrointestinal: Negative. Endocrine: Negative. Genitourinary: Negative. Musculoskeletal: Negative. Skin: Negative. Allergic/Immunologic: Negative. Neurological:  Positive for numbness. Negative for dizziness, tremors, seizures, syncope, facial asymmetry, speech difficulty, weakness, light-headedness and headaches. Hematological: Negative. Psychiatric/Behavioral: Negative. Physical Exam  Physical Exam  Nursing note reviewed. Constitutional:       General: She is not in acute distress. Appearance: Normal appearance. She is normal weight. She is not ill-appearing or toxic-appearing. HENT:      Head: Normocephalic and atraumatic. Right Ear: Tympanic membrane and external ear normal.      Left Ear: Tympanic membrane, ear canal and external ear normal.      Nose: Nose normal.      Mouth/Throat:      Mouth: Mucous membranes are moist.      Pharynx: Oropharynx is clear. Eyes:      Extraocular Movements: Extraocular movements intact.       Conjunctiva/sclera: Conjunctivae normal.      Pupils: Pupils are equal, round, and reactive to light. Cardiovascular:      Rate and Rhythm: Normal rate and regular rhythm. Pulses: Normal pulses. Heart sounds: Normal heart sounds. Pulmonary:      Effort: Pulmonary effort is normal.      Breath sounds: Normal breath sounds. Abdominal:      General: Abdomen is flat. Bowel sounds are normal.      Palpations: Abdomen is soft. Musculoskeletal:         General: No swelling, tenderness, deformity or signs of injury. Normal range of motion. Cervical back: Normal range of motion and neck supple. Right lower leg: No edema. Left lower leg: No edema. Skin:     General: Skin is warm. Capillary Refill: Capillary refill takes less than 2 seconds. Neurological:      General: No focal deficit present. Mental Status: She is alert and oriented to person, place, and time. Mental status is at baseline. Cranial Nerves: No cranial nerve deficit. Sensory: No sensory deficit. Motor: No weakness. Gait: Gait normal.   Psychiatric:         Mood and Affect: Mood normal.         Behavior: Behavior normal.         Thought Content:  Thought content normal.         Judgment: Judgment normal.         Vital Signs  ED Triage Vitals [11/08/23 1819]   Temperature Pulse Respirations Blood Pressure SpO2   (!) 97 °F (36.1 °C) 62 20 127/71 97 %      Temp src Heart Rate Source Patient Position - Orthostatic VS BP Location FiO2 (%)   -- -- -- -- --      Pain Score       No Pain           Vitals:    11/08/23 1819   BP: 127/71   Pulse: 62         Visual Acuity      ED Medications  Medications   magnesium Oxide (MAG-OX) tablet 800 mg (800 mg Oral Given 11/8/23 2000)       Diagnostic Studies  Results Reviewed       Procedure Component Value Units Date/Time    Comprehensive metabolic panel [[id number]]  (Abnormal) Collected: 11/08/23 1902    Lab Status: Final result Specimen: Blood from Arm, Right Updated: 11/08/23 1927     Sodium 137 mmol/L Potassium 3.9 mmol/L      Chloride 102 mmol/L      CO2 29 mmol/L      ANION GAP 6 mmol/L      BUN 10 mg/dL      Creatinine 0.91 mg/dL      Glucose 107 mg/dL      Calcium 9.8 mg/dL      AST 12 U/L      ALT 12 U/L      Alkaline Phosphatase 70 U/L      Total Protein 6.7 g/dL      Albumin 4.3 g/dL      Total Bilirubin 0.51 mg/dL      eGFR 74 ml/min/1.73sq m     Narrative:      National Kidney Disease Foundation guidelines for Chronic Kidney Disease (CKD):     Stage 1 with normal or high GFR (GFR > 90 mL/min/1.73 square meters)    Stage 2 Mild CKD (GFR = 60-89 mL/min/1.73 square meters)    Stage 3A Moderate CKD (GFR = 45-59 mL/min/1.73 square meters)    Stage 3B Moderate CKD (GFR = 30-44 mL/min/1.73 square meters)    Stage 4 Severe CKD (GFR = 15-29 mL/min/1.73 square meters)    Stage 5 End Stage CKD (GFR <15 mL/min/1.73 square meters)  Note: GFR calculation is accurate only with a steady state creatinine    Magnesium [[id number]]  (Abnormal) Collected: 11/08/23 1902    Lab Status: Final result Specimen: Blood from Arm, Right Updated: 11/08/23 1927     Magnesium 1.8 mg/dL     CBC and differential [[id number]]  (Abnormal) Collected: 11/08/23 1902    Lab Status: Final result Specimen: Blood from Arm, Right Updated: 11/08/23 1909     WBC 12.49 Thousand/uL      RBC 4.43 Million/uL      Hemoglobin 13.9 g/dL      Hematocrit 40.9 %      MCV 92 fL      MCH 31.4 pg      MCHC 34.0 g/dL      RDW 12.5 %      MPV 9.1 fL      Platelets 823 Thousands/uL      nRBC 0 /100 WBCs      Neutrophils Relative 56 %      Immat GRANS % 0 %      Lymphocytes Relative 34 %      Monocytes Relative 6 %      Eosinophils Relative 3 %      Basophils Relative 1 %      Neutrophils Absolute 7.01 Thousands/µL      Immature Grans Absolute 0.03 Thousand/uL      Lymphocytes Absolute 4.26 Thousands/µL      Monocytes Absolute 0.75 Thousand/µL      Eosinophils Absolute 0.38 Thousand/µL      Basophils Absolute 0.06 Thousands/µL                    CT head without contrast   Final Result by Will Greenfield MD (11/08 1947)         1. Encephalomalacia in the left frontal lobe could represent sequela of prior trauma, infarct or infection. 2. No acute intracranial hemorrhage or mass effect. Workstation performed: [id number]                    Procedures  Procedures         ED Course  ED Course as of 11/08/23 2117 Wed Nov 08, 2023   1849 Seen and evaluated, orders placed, hand dominant, felt numbness in her right hand resolved then became numb in her face and left arm became numb without chest pain without back pain, without presyncopal symptoms. Patient does have a prior history of migraines but is not having a headache. She reports having a large amount of anxiety over the last few months secondary to finances. SBIRT 22yo+      Flowsheet Row Most Recent Value   Initial Alcohol Screen: US AUDIT-C     1. How often do you have a drink containing alcohol? 0 Filed at: 11/08/2023 1903   2. How many drinks containing alcohol do you have on a typical day you are drinking? 0 Filed at: 11/08/2023 1903   3a. Male UNDER 65: How often do you have five or more drinks on one occasion? 0 Filed at: 11/08/2023 1903   3b. FEMALE Any Age, or MALE 65+: How often do you have 4 or more drinks on one occassion? 0 Filed at: 11/08/2023 1903   Audit-C Score 0 Filed at: 11/08/2023 1903   CANDIS: How many times in the past year have you. .. Used an illegal drug or used a prescription medication for non-medical reasons? Never Filed at: 11/08/2023 1903                      Medical Decision Making  Focused differential diagnosis in this patient was as follows: Electrolyte abnormalities versus hyperventilation versus anxiety versus paraesthesia. Magnesium level marginally decreased, replaced. No chest pain, no back pain, no other neurological complaints. Portions of the record may have been created with voice recognition software.  Occasional wrong word or "sound a like" substitutions may have occurred due to the inherent limitations of voice recognition software. Read the chart carefully and recognize, using context, where substitutions have occurred. Counseling: I had a detailed discussion with the patient and/or guardian regarding: the historical points, exam findings, and any diagnostic results supporting the discharge diagnosis, lab results, radiology results, discharge instructions reviewed with patient and/or family/caregiver and understanding was verbalized. Instructions given to return to the emergency department if symptoms worsen or persist, or if there are any questions or concerns that arise at home. Amount and/or Complexity of Data Reviewed  Labs: ordered. Radiology: ordered. Risk  OTC drugs. Disposition  Final diagnoses:   Paresthesia   Hypomagnesemia     Time reflects when diagnosis was documented in both MDM as applicable and the Disposition within this note       Time User Action Codes Description Comment    11/8/2023  8:10 PM Seema Rancho Add [R20.2] Paresthesia     11/8/2023  8:10 PM Seema Rancho Add [E83.42] Hypomagnesemia           ED Disposition       ED Disposition   Discharge    Condition   Stable    Date/Time   Wed Nov 8, 2023 2010    Comment   [place] Henderson discharge to home/self care.                    Follow-up Information       Follow up With Specialties Details Why [address], MD Family Medicine   [address] [place] [address]  [phone]              Discharge Medication List as of 11/8/2023  8:10 PM        CONTINUE these medications which have NOT CHANGED    Details   !! ARIPiprazole (ABILIFY) 15 mg tablet Take 15 mg by mouth daily, Starting Sat 3/11/2023, Historical Med      !! ARIPiprazole (ABILIFY) 5 mg tablet Take 5 mg by mouth daily, Starting Tue 7/21/2020, Historical Med      !! ARIPiprazole (ABILIFY) 5 mg tablet Take 5 mg by mouth daily, Starting Sun 9/13/2020, Historical Med      !! atorvastatin (LIPITOR) 20 mg tablet take 1 tablet by mouth once daily, Historical Med      !! atorvastatin (LIPITOR) 20 mg tablet Take 20 mg by mouth daily, Starting Tue 7/28/2020, Historical Med      BD Pen Needle Xuan 2nd Gen 32G X 4 MM MISC USE DAILY WITH VICTOZA, Historical Med      !! citalopram (CeleXA) 20 mg tablet Take by mouth, Historical Med      !! citalopram (CeleXA) 40 mg tablet Take 40 mg by mouth daily, Starting Tue 7/21/2020, Historical Med      !! citalopram (CeleXA) 40 mg tablet Take 40 mg by mouth daily, Starting Thu 10/1/2020, Historical Med      !! clonazePAM (KlonoPIN) 0.5 mg tablet Take by mouth, Historical Med      !! clonazePAM (KlonoPIN) 1 mg tablet Take 1/2 tab PO PRN daily and 1 tab PO QHS. , Historical Med      !! clonazePAM (KlonoPIN) 1 mg tablet take 1/2 tablet by mouth once daily if needed AND 1 TABLET AT BEDTIME., Historical Med      ibuprofen (MOTRIN) 600 mg tablet Take 1 tablet (600 mg total) by mouth every 6 (six) hours as needed for mild pain, Starting Tue 10/6/2020, Normal      !! levothyroxine 50 mcg tablet Take 50 mcg by mouth daily, Starting Thu 7/30/2020, Historical Med      !! levothyroxine 50 mcg tablet Take 50 mcg by mouth daily, Starting Fri 7/31/2020, Historical Med      Lidocaine Viscous HCl (XYLOCAINE) 2 % mucosal solution USE AS DIRECTED, Normal      liraglutide (Victoza) injection Inject 1.8 mg under the skin daily, Starting Mon 5/18/2020, Historical Med      !! meloxicam (MOBIC) 15 mg tablet take 1 tablet by mouth once daily WITH FOOD, Historical Med      !! meloxicam (MOBIC) 15 mg tablet Take 15 mg by mouth daily take with food, Starting Fri 8/7/2020, Historical Med      metFORMIN (GLUCOPHAGE) 1000 MG tablet Take 1,000 mg by mouth 2 (two) times a day with meals, Starting Fri 8/7/2020, Historical Med      !! pantoprazole (PROTONIX) 40 mg tablet Take 40 mg by mouth 2 (two) times a day, Starting Wed 8/12/2020, Historical Med      !! pantoprazole (PROTONIX) 40 mg tablet Take 40 mg by mouth 2 (two) times a day, Starting Wed 8/12/2020, Historical Med      traZODone (DESYREL) 100 mg tablet Take 100 mg by mouth daily at bedtime as needed, Starting Mon 6/12/2023, Historical Med       !! - Potential duplicate medications found. Please discuss with provider. No discharge procedures on file.     PDMP Review       None            ED Provider  Electronically Signed by             Felix Shepherd III, DO  11/08/23 2655 ISS and FS   -Can restart home basal/bolus if FS >180

## 2023-12-02 NOTE — PROGRESS NOTE ADULT - PROVIDER SPECIALTY LIST ADULT
Palliative Care Problem: Self Harm/Suicidality  Goal: Will have no self-injury during hospital stay  Description: INTERVENTIONS:  1. Ensure constant observer at bedside with Q15M safety checks  2. Maintain a safe environment  3. Secure patient belongings  4. Ensure family/visitors adhere to safety recommendations  5. Ensure safety tray has been added to patient's diet order  6. Every shift and PRN: Re-assess suicidal risk via Frequent Screener    12/1/2023 2035 by Mahamed Gould RN  Outcome: Progressing  Note: Patient denies any thoughts to suicidal ideations and no signs of self harm were noted. Patient encouraged to inform staff if she starts to develop any thoughts to harm self. Patient voiced understanding. Q 15 minute checks done on pt for safety      Problem: Depression  Goal: Will be euthymic at discharge  Description: INTERVENTIONS:  1. Administer medication as ordered  2. Provide emotional support via 1:1 interaction with staff  3. Encourage involvement in milieu/groups/activities  4. Monitor for social isolation  12/1/2023 2035 by Mahamed Gould RN  Outcome: Progressing  Note: Patient denies any depression currently. Patient is brightened and social in dayroom with select peers. Patient denies any thoughts to harm self. Patient reports looking forward to discharge. Q 15 minute checks done on pt for safety      Problem: Behavior  Goal: Pt/Family maintain appropriate behavior and adhere to behavioral management agreement, if implemented  Description: INTERVENTIONS:  1. Assess patient/family's coping skills and  non-compliant behavior (including use of illegal substances)  2. Notify security of behavior or suspected illegal substances which indicate the need for search of the family and/or belongings  3. Encourage verbalization of thoughts and concerns in a socially appropriate manner  4. Utilize positive, consistent limit setting strategies supporting safety of patient, staff and others  5.  Encourage participation in the decision making process about the behavioral management agreement  6. If a visitor's behavior poses a threat to safety call refer to organization policy. 7. Initiate consult with , Psychosocial CNS, Spiritual Care as appropriate  12/1/2023 2035 by Anat Stokes RN  Outcome: Progressing  Note: Patient has been in behavior control this shift so far. Patient has been taking medications as prescribed. Q 15 minute checks done on pt for safety      Problem: Anxiety  Goal: Will report anxiety at manageable levels  Description: INTERVENTIONS:  1. Administer medication as ordered  2. Teach and rehearse alternative coping skills  3. Provide emotional support with 1:1 interaction with staff  12/1/2023 2035 by Anat Stokes RN  Outcome: Progressing  Note: Patient reports some anxiety. Patient was given 1:1 talk time, quiet time  in room and distraction techniques of doing puzzles in dayroom. Patient requested atarax at bedtime so she wouldn't stay awake thinking about the discharge in the morning. Medication will be provided as ordered. Q 15 minute checks done on pt for safety      Problem: Risk for Elopement  Goal: Patient will not exit the unit/facility without proper excort  12/1/2023 2035 by Anat Stokes RN  Outcome: Progressing  Flowsheets (Taken 12/1/2023 2035)  Nursing Interventions for Elopement Risk:   Escort with two staff members if patient must leave the unit   Make sure patient has all necessary personal care items   Reduce environmental triggers  Note: Patient has not attempted to leave unit this shift so far. Q 15 minute checks done on pt for safety      Problem: Pain  Goal: Verbalizes/displays adequate comfort level or baseline comfort level  12/1/2023 2035 by Anat Stokes RN  Outcome: Progressing  Flowsheets (Taken 12/1/2023 2035)  Verbalizes/displays adequate comfort level or baseline comfort level:   Encourage patient to monitor pain and request

## 2024-01-25 NOTE — PROGRESS NOTE ADULT - ATTENDING COMMENTS
70 M with CABG, ESRD on HD, known history of L pleural effusion, afib on a/c here with chest pain and concern for GIB, now with septic shock due to MRSA bacteremia requiring pressors, c/b cardiac arrest after ?Gm/vagal episode with ROSC, now acute hypoxemic respiratory failure due to likely aspiration pneumonitis.    Likely TRUMAN on Monday given concern for endocarditis, surveillance cultures negative thus far  will receive HD today, may need to redose vanco after checking level  h/h stable  wean sedation, try to assess mental status, start PS trials 072

## 2024-02-07 NOTE — DIETITIAN INITIAL EVALUATION ADULT. - PROBLEM/PLAN-3
DISPLAY PLAN FREE TEXT
Simple: Patient demonstrates quick and easy understanding/Patient asked questions/Verbalized Understanding

## 2024-02-14 NOTE — INPATIENT CERTIFICATION FOR MEDICARE PATIENTS - RISKS OF ADVERSE EVENTS
Concern for delay in diagnosis and treatment pt declining to get OOB to chair at this time. Pt reports nursing has been getting him OOB to chair via niharika. At baseline pt reports he is able to perform pop-over transfers to w/c - does not use a sliding board

## 2024-03-09 NOTE — CHART NOTE - NSCHARTNOTESELECT_GEN_ALL_CORE
Event Note
Event Note/Thoracic
Event Note/Thoracic surgery
Event Note/family /son
Nutrition Services
Thoracic PA/Event Note
Vascular PA/Event Note
Event Note
show

## 2024-04-15 NOTE — PATIENT PROFILE ADULT - NSPROMEDSPUMP_GEN_A_NUR
I attest my time as PA/NP is greater than 50% of the total combined time spent on qualifying patient care activities by the PA/NP and attending.
none

## 2024-05-07 NOTE — PROGRESS NOTE ADULT - SUBJECTIVE AND OBJECTIVE BOX
Chief complaint  Patient is a 69y old  Male who presents with a chief complaint of transferred for work up (01 Oct 2019 16:12)   Review of systems  Patient in bed, looks comfortable, no fever, no hypoglycemia.    Labs and Fingersticks  CAPILLARY BLOOD GLUCOSE      POCT Blood Glucose.: 182 mg/dL (02 Oct 2019 12:03)  POCT Blood Glucose.: 129 mg/dL (02 Oct 2019 07:43)  POCT Blood Glucose.: 162 mg/dL (01 Oct 2019 22:12)  POCT Blood Glucose.: 118 mg/dL (01 Oct 2019 17:01)      Anion Gap, Serum: 18 <H> (10-02 @ 05:00)  Anion Gap, Serum: 20 <H> (10-01 @ 04:24)      Calcium, Total Serum: 8.8 (10-02 @ 05:00)  Calcium, Total Serum: 8.8 (10-01 @ 04:24)  Albumin, Serum: 3.0 <L> (10-01 @ 11:36)  Albumin, Serum: 3.2 <L> (10-01 @ 04:24)    Alanine Aminotransferase (ALT/SGPT): 19 (10-01 @ 11:36)  Alanine Aminotransferase (ALT/SGPT): 23 (10-01 @ 04:24)  Alkaline Phosphatase, Serum: 65 (10-01 @ 11:36)  Alkaline Phosphatase, Serum: 74 (10-01 @ 04:24)  Aspartate Aminotransferase (AST/SGOT): 19 (10-01 @ 11:36)  Aspartate Aminotransferase (AST/SGOT): 19 (10-01 @ 04:24)        10-02    134<L>  |  90<L>  |  63<H>  ----------------------------<  141<H>  4.7   |  26  |  7.42<H>    Ca    8.8      02 Oct 2019 05:00    TPro  5.9<L>  /  Alb  3.0<L>  /  TBili  0.3  /  DBili  0.1  /  AST  19  /  ALT  19  /  AlkPhos  65  10-01                        8.8    11.35 )-----------( 278      ( 02 Oct 2019 05:00 )             27.8     Medications  MEDICATIONS  (STANDING):  ALBUTerol/ipratropium for Nebulization 3 milliLiter(s) Nebulizer every 6 hours  amiodarone    Tablet 200 milliGRAM(s) Oral daily  amiodarone    Tablet   Oral   aspirin enteric coated 81 milliGRAM(s) Oral daily  atorvastatin 40 milliGRAM(s) Oral at bedtime  dextrose 5%. 1000 milliLiter(s) (50 mL/Hr) IV Continuous <Continuous>  dextrose 50% Injectable 12.5 Gram(s) IV Push once  dextrose 50% Injectable 25 Gram(s) IV Push once  dextrose 50% Injectable 25 Gram(s) IV Push once  docusate sodium 100 milliGRAM(s) Oral three times a day  famotidine    Tablet 20 milliGRAM(s) Oral at bedtime  insulin glargine Injectable (LANTUS) 14 Unit(s) SubCutaneous at bedtime  insulin lispro (HumaLOG) corrective regimen sliding scale   SubCutaneous three times a day before meals  insulin lispro (HumaLOG) corrective regimen sliding scale   SubCutaneous at bedtime  ketotifen 0.025% Ophthalmic Solution 1 Drop(s) Both EYES two times a day  methimazole 5 milliGRAM(s) Oral daily  metoprolol tartrate 50 milliGRAM(s) Oral two times a day  sevelamer carbonate 800 milliGRAM(s) Oral three times a day with meals  sodium chloride 0.9% lock flush 3 milliLiter(s) IV Push every 8 hours  tiotropium 18 MICROgram(s) Capsule 1 Capsule(s) Inhalation daily      Physical Exam  General: Patient comfortable in bed  Vital Signs Last 12 Hrs  T(F): 97.7 (10-02-19 @ 04:36), Max: 97.7 (10-02-19 @ 04:36)  HR: 56 (10-02-19 @ 09:41) (56 - 71)  BP: 149/75 (10-02-19 @ 06:08) (144/72 - 149/75)  BP(mean): --  RR: 20 (10-02-19 @ 06:08) (18 - 20)  SpO2: 100% (10-02-19 @ 09:41) (98% - 100%)  Neck: No palpable thyroid nodules.  CVS: S1S2, No murmurs  Respiratory: No wheezing, no crepitations  GI: Abdomen soft, bowel sounds positive  Musculoskeletal:  edema lower extremities.   Skin: No skin rashes, no ecchymosis    Diagnostics [Snoring] : snoring [Negative] : Heme/Lymph

## 2024-07-05 NOTE — DIETITIAN INITIAL EVALUATION ADULT. - PROBLEM/PLAN-5
Cherri notified of poss DC pending results     Electronically signed by Sara Faust RN on 7/5/2024 at 12:46 PM    IZABELLAStalin Adams LUND notified of possibility of echo not getting completed today, pt reports that he is leaving either way today at 4pm whether DC or AMA. - she spoke with echo and it will get completed today.     Dr. Sandoval updated as well.     Electronically signed by Sara Faust RN on 7/5/2024 at 1:36 PM     DISPLAY PLAN FREE TEXT

## 2024-11-27 NOTE — PROGRESS NOTE ADULT - SUBJECTIVE AND OBJECTIVE BOX
S: Denies chest pain or SOB at rest, has slight SOB with ambulation. Review of systems otherwise (-)      MEDICATIONS  (STANDING):  amLODIPine   Tablet 10 milliGRAM(s) Oral daily  aspirin  chewable 81 milliGRAM(s) Oral daily  atorvastatin 80 milliGRAM(s) Oral at bedtime  chlorhexidine 0.12% Liquid 15 milliLiter(s) Swish and Spit once  chlorhexidine 2% Cloths 1 Application(s) Topical <User Schedule>  docusate sodium 100 milliGRAM(s) Oral three times a day  glycopyrrolate 9 MICROgram(s)/formoterol 4.8 MICROgram(s)Inhaler 2 Puff(s) Inhalation two times a day  heparin  Infusion. 1100 Unit(s)/Hr (11 mL/Hr) IV Continuous <Continuous>  insulin lispro (HumaLOG) corrective regimen sliding scale   SubCutaneous three times a day before meals  insulin lispro (HumaLOG) corrective regimen sliding scale   SubCutaneous at bedtime  ketotifen 0.025% Ophthalmic Solution 1 Drop(s) Both EYES two times a day  methimazole 5 milliGRAM(s) Oral daily  metoprolol tartrate 25 milliGRAM(s) Oral every 8 hours  pantoprazole    Tablet 40 milliGRAM(s) Oral before breakfast  sevelamer carbonate 1600 milliGRAM(s) Oral three times a day with meals  sodium chloride 0.9% lock flush 3 milliLiter(s) IV Push every 8 hours  tiotropium 18 MICROgram(s) Capsule 1 Capsule(s) Inhalation daily    MEDICATIONS  (PRN):  ALBUTerol    90 MICROgram(s) HFA Inhaler 2 Puff(s) Inhalation every 4 hours PRN Wheezing      LABS:                            12.8   8.0   )-----------( 145      ( 20 Sep 2019 05:39 )             35.2     Hemoglobin: 12.8 g/dL ( @ 05:39)  Hemoglobin: 11.6 g/dL ( @ 03:59)  Hemoglobin: 12.6 g/dL ( @ 05:08)  Hemoglobin: 11.5 g/dL ( @ 01:30)  Hemoglobin: 13.3 g/dL ( @ 17:12)        134<L>  |  93<L>  |  34<H>  ----------------------------<  187<H>  3.9   |  26  |  5.55<H>    Ca    9.5      20 Sep 2019 05:39    TPro  6.7  /  Alb  3.5  /  TBili  0.3  /  DBili  x   /  AST  6<L>  /  ALT  5<L>  /  AlkPhos  75      Creatinine Trend: 5.55<--, 8.20<--, 6.59<--, 8.66<--, 8.24<--, 7.80<--   PT/INR - ( 20 Sep 2019 05:39 )   PT: 12.9 sec;   INR: 1.13 ratio         PTT - ( 20 Sep 2019 05:39 )  PTT:52.4 sec  CARDIAC MARKERS ( 17 Sep 2019 12:11 )  x     / x     / 68 U/L / x     / x              19 @ 07:01  -  19 @ 07:00  --------------------------------------------------------  IN: 0 mL / OUT: 2500 mL / NET: -2500 mL    19 @ 07:01  -  19 @ 11:28  --------------------------------------------------------  IN: 420 mL / OUT: 0 mL / NET: 420 mL        PHYSICAL EXAM  Vital Signs Last 24 Hrs  T(C): 36.7 (20 Sep 2019 04:49), Max: 36.7 (19 Sep 2019 13:05)  T(F): 98 (20 Sep 2019 04:49), Max: 98.1 (19 Sep 2019 13:05)  HR: 74 (20 Sep 2019 04:49) (45 - 74)  BP: 164/79 (20 Sep 2019 04:49) (152/68 - 179/86)  BP(mean): --  RR: 18 (20 Sep 2019 04:49) (16 - 18)  SpO2: 92% (20 Sep 2019 04:49) (92% - 95%)      Gen: Appears well in NAD  HEENT:  (-)icterus (-)pallor  CV: N S1 S2 1/6 DEJAH (+)2 Pulses B/l  Resp:  Clear to auscultation B/L, normal effort  GI: (+) BS Soft, NT, ND  Lymph:  (-)Edema, (-)obvious lymphadenopathy  Skin: Warm to touch, Normal turgor  Psych: Appropriate mood and affect    TELEMETRY: NSR 70-80      < from: Cardiac Cath Lab - Adult (19 @ 12:28) >  PROCEDURE:  --  Left heart catheterization.  --  Left coronary angiography.  --  Right coronary angiography.  --  Sonosite - Diagnostic.  VENTRICLES: No LV gram was performed; however, a recent echocardiogram  demonstrated normal global and regional LV function.  CORONARY VESSELS: The coronary circulation is right dominant.  LM:   --  Ostial LM: There was a discrete 90 % stenosis.  LAD:   --  Mid LAD: Angiography showed minor luminal irregularities with no  flow limiting lesions. The stent in the mid LAD is patent.  --  Distal LAD: The vessel was small sized. Angiography showed moderate  atherosclerosis.  CX:   --  Mid circumflex: There was a tubular 50 % stenosis at the site of  a prior stent.  RI:   --  Proximal ramus intermedius: There was a diffuse 50 % stenosis at  the site of a prior stent.  RCA:   --  Mid RCA: There was a tubular 50 % stenosis at the site of a  prior stent.  --  RPLS: Angiography showed minor luminal irregularities with no flow  limiting lesions.  COMPLICATIONS: There were no complications.  DIAGNOSTIC RECOMMENDATIONS: Consultation with a cardiac surgeon be obtained  for surgical opinion and coronary artery bypass grafting.  Prepared and signed by  Jessy Méndez M.D.  Signed 2019 13:42:23  HEMODYNAMIC TABLES  Pressures:  NO PHASE  Pressures:  - HR: 69  Pressures:  - Rhythm:  Pressures:  -- Aortic Pressure (S/D/M): 170/70/113  Pressures:  -- Left Ventricle (s/edp): 164/28/--  Outputs:  NO PHASE  Outputs:  -- CALCULATIONS[age]  Outputs:  -- CALCULATIONS: Body Surface Area: 1.92  Outputs:  -- CALCULATIONS: Height in cm: 168.00  Outputs:  -- CALCULATIONS: Sex: Male  Outputs:  -- CALCULATIONS: Weight in k.60  Outputs:  -- OUTPUTS: O2 consumption: 239.38  Outputs:  -- OUTPUTS: Vo2 Indexed: 125.00  < end of copied text >    ASSESSMENT/PLAN: 70 y/o male with PMHx of CAD, HTN, HLD, T2DM, and ESRD on HD presented after abnormal stress test to [facility] for elective cardiac catheterization. Patient admitted post cath after cath revealed severe ostial LM disease. Now transferred to [facility] for CABG.  	  - Cath revealed severe ostial LM disease  - Tele remains stable in NSR  - Continue ASA/Statin/BB  - HD per renal  - Continue hep gtt  - F/u CTS plan for CABG on Monday    Wilfred Robin PA-C  [facility]  Pager: [phone] DISPLAY PLAN FREE TEXT DISPLAY PLAN FREE TEXT DISPLAY PLAN FREE TEXT DISPLAY PLAN FREE TEXT

## 2025-04-21 NOTE — DIETITIAN INITIAL EVALUATION ADULT. - OTHER INFO
No Pt visited. Pt seen for LOS. Pt Reports Good appetite. NKFA. H/O HD x 4-5 years, H/O DM x ~ 10 yrs ( IDDM). D/W RN Reports Pt ate < 50 % of meal. Per pt his Dry -182 lb. Ht 5 feet 6 inches.

## 2025-07-07 NOTE — PROGRESS NOTE ADULT - ATTENDING COMMENTS
Patient HPI:  69M with CAD s/p multiple stents, s/p CABG (9/2019 course c/b Afib and LT pleural effusion), Diastolic CHF, HTN, HLD, DM2, Afib on coumadin and ESRD on HD (Tu, Thurs, Sat at [facility]), hyperthyroidism, presented to ER for pain and swelling of left leg since 3 days. Patient reports sustaining injury to posterior aspect of left leg 1 month ago when he accidentally hit his leg against his bed. He reports the wound has not healed since. Reports he developed pain and swelling of left leg since 3 days, 9/10 in severity, with difficulty ambulating. Reports he was advised to get doppler which he reviewed with [facility] cardiologist today, was told he has no clot and recommended to come to ER. Denies fever, chills, weakness, cough, dysuria, NVDC. (18 Jun 2020 19:29)    -  CELLULITIS OF LEFT LEG, DIABETIC LEFT FOOT ULCER  - IMPROVED ON VANCOMYCIN [PER ID RECOMMENDATION, SWITCHED FROM UNASYN+DOXY]. BLOOD  CXS NGTD. ID F/UP BY DR. DUEÑAS    - LOCULATED LEFT SIDED PLEURAL EFFUSION S/P IR-GUIDED CHEST TUBE PLACEMENT 07- - PULMONOLOGY CONSULT PLACED. SURGERY CONSULT PLACED. CT-GUIDED DRAINAGE ATTEMPTED HOWEVER IT WAS UNSUCCESSFUL; UNDERWENT THORACENTESIS. F/U PLEURAL FLUID ANALYSIS. UNDERWENT IR GUIDED CHEST-TUBE PLACEMENT [CASE DISCUSSED WITH PATIENT AND SON; THEY AGREED TO THE PROCEDURE] - DRAINED > 1000CC SEROSANGUINOUS FLUID  - ACUTE METABOLIC ENCEPHALOPATHY - WILL MINIMIZE DELIRIOGENIC AGENTS, FREQUENT RE-ORIENTATION  - LEFT ARM SWELLING -  ULTRASOUND NEGATIVE  -  ESRD ON HD - NEPHROLOGY CONSULT IN PROGRESS  - DIASTOLIC CHF - TTE W/ EVIDENCE OF RIGHT/LEFT SYSTOLIC DYSFXN, PULMONARY HTN, MR, STRESS TEST WITHOUT NEW PERFUSION ABNORMALITIES; CARDIOLOGY CONSULT IN PROGRESS  - A/FIB ON COUMADIN - HOLD COUMADIN, PLACED ON HEPARIN DRIP; RESUME COUMADIN TOMORROW  -  PAD, DIABETIC PERIPHERAL NEUROPATHY - PAIN MGMT CONSULT IN PROGRESS  - CASE DISCUSSED EXTENSIVELY WITH SON [SYLVAIN RENTERIA ] AND PATIENT. DISCUSSED RISKS/BENEFITS OF CHEST TUBE PLACEMENT WITH SON, WHO AGREED TO PROCEED WITH INTERVENTION.  -  GI AND DVT PROPHYLAXIS    DONALD DIAZ M.D. COVERING EPHRAIM DIAZ M.D.